# Patient Record
Sex: MALE | Race: WHITE | NOT HISPANIC OR LATINO | Employment: FULL TIME | URBAN - METROPOLITAN AREA
[De-identification: names, ages, dates, MRNs, and addresses within clinical notes are randomized per-mention and may not be internally consistent; named-entity substitution may affect disease eponyms.]

---

## 2019-08-23 ENCOUNTER — HOSPITAL ENCOUNTER (EMERGENCY)
Facility: OTHER | Age: 19
Discharge: HOME | End: 2019-08-23
Attending: EMERGENCY MEDICINE
Payer: COMMERCIAL

## 2019-08-23 VITALS
OXYGEN SATURATION: 99 % | HEIGHT: 71 IN | DIASTOLIC BLOOD PRESSURE: 78 MMHG | HEART RATE: 89 BPM | BODY MASS INDEX: 20.3 KG/M2 | RESPIRATION RATE: 17 BRPM | WEIGHT: 145 LBS | SYSTOLIC BLOOD PRESSURE: 115 MMHG | TEMPERATURE: 98 F

## 2019-08-23 DIAGNOSIS — R07.89 CHEST WALL PAIN: ICD-10-CM
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
## 2019-08-23 PROCEDURE — 94799 UNLISTED PULMONARY SVC/PX: CPT

## 2019-08-23 PROCEDURE — 99283 EMERGENCY DEPT VISIT LOW MDM: CPT | Mod: 25

## 2019-08-23 PROCEDURE — 99900035 HC TECH TIME PER 15 MIN (STAT)

## 2019-08-23 RX ORDER — KETOROLAC TROMETHAMINE 30 MG/ML
15 INJECTION, SOLUTION INTRAMUSCULAR; INTRAVENOUS
Status: DISCONTINUED | OUTPATIENT
Start: 2019-08-23 | End: 2019-08-24 | Stop reason: HOSPADM

## 2019-08-23 RX ORDER — PSEUDOEPHEDRINE HCL 30 MG
30 TABLET ORAL EVERY 4 HOURS PRN
COMMUNITY
End: 2022-10-18

## 2019-08-24 NOTE — DISCHARGE INSTRUCTIONS
Please continue to use an ice pack to the affected regions.  Take Tylenol or Motrin for pain. Follow-up with ENT specialist this week.  Return to the ER if your symptoms worsen.
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
## 2021-01-03 ENCOUNTER — OFFICE VISIT (OUTPATIENT)
Dept: URGENT CARE | Facility: CLINIC | Age: 21
End: 2021-01-03

## 2021-01-03 VITALS
SYSTOLIC BLOOD PRESSURE: 118 MMHG | HEIGHT: 70 IN | TEMPERATURE: 99 F | OXYGEN SATURATION: 97 % | BODY MASS INDEX: 22.19 KG/M2 | HEART RATE: 108 BPM | WEIGHT: 155 LBS | DIASTOLIC BLOOD PRESSURE: 75 MMHG | RESPIRATION RATE: 18 BRPM

## 2021-01-03 DIAGNOSIS — H66.93 BILATERAL OTITIS MEDIA, UNSPECIFIED OTITIS MEDIA TYPE: ICD-10-CM

## 2021-01-03 DIAGNOSIS — M26.609 TMJ (TEMPOROMANDIBULAR JOINT DISORDER): ICD-10-CM

## 2021-01-03 DIAGNOSIS — H60.93 OTITIS EXTERNA OF BOTH EARS, UNSPECIFIED CHRONICITY, UNSPECIFIED TYPE: ICD-10-CM

## 2021-01-03 DIAGNOSIS — H61.23 BILATERAL IMPACTED CERUMEN: Primary | ICD-10-CM

## 2021-01-03 PROCEDURE — 99214 OFFICE O/P EST MOD 30 MIN: CPT | Mod: TIER,S$GLB,, | Performed by: FAMILY MEDICINE

## 2021-01-03 PROCEDURE — 99214 PR OFFICE/OUTPT VISIT, EST, LEVL IV, 30-39 MIN: ICD-10-PCS | Mod: TIER,S$GLB,, | Performed by: FAMILY MEDICINE

## 2021-01-03 RX ORDER — NEOMYCIN SULFATE, POLYMYXIN B SULFATE, HYDROCORTISONE 3.5; 10000; 1 MG/ML; [USP'U]/ML; MG/ML
4 SOLUTION/ DROPS AURICULAR (OTIC) 3 TIMES DAILY
Qty: 10 ML | Refills: 0 | Status: SHIPPED | OUTPATIENT
Start: 2021-01-03 | End: 2021-01-13

## 2021-01-03 RX ORDER — DOXYCYCLINE 100 MG/1
100 CAPSULE ORAL 2 TIMES DAILY
Qty: 20 CAPSULE | Refills: 0 | Status: SHIPPED | OUTPATIENT
Start: 2021-01-03 | End: 2021-01-13

## 2021-04-05 DIAGNOSIS — N63.20 UNSPECIFIED LUMP IN THE LEFT BREAST, UNSPECIFIED QUADRANT: Primary | ICD-10-CM

## 2021-04-13 ENCOUNTER — HOSPITAL ENCOUNTER (OUTPATIENT)
Dept: RADIOLOGY | Facility: HOSPITAL | Age: 21
Discharge: HOME | End: 2021-04-13
Attending: NURSE PRACTITIONER

## 2021-04-13 DIAGNOSIS — N63.20 UNSPECIFIED LUMP IN THE LEFT BREAST, UNSPECIFIED QUADRANT: ICD-10-CM

## 2021-04-13 PROCEDURE — 76642 ULTRASOUND BREAST LIMITED: CPT | Mod: 26,LT,, | Performed by: RADIOLOGY

## 2021-04-13 PROCEDURE — 76642 ULTRASOUND BREAST LIMITED: CPT | Mod: TC,LT

## 2021-04-13 PROCEDURE — 76642 US BREAST LEFT LIMITED: ICD-10-PCS | Mod: 26,LT,, | Performed by: RADIOLOGY

## 2021-05-05 DIAGNOSIS — N49.2 INFLAMMATION OF SCROTUM: Primary | ICD-10-CM

## 2021-05-05 DIAGNOSIS — N50.819 PAIN IN TESTICLE, UNSPECIFIED LATERALITY: ICD-10-CM

## 2021-05-05 DIAGNOSIS — N62 GYNECOMASTIA: ICD-10-CM

## 2021-05-11 ENCOUNTER — HOSPITAL ENCOUNTER (OUTPATIENT)
Dept: RADIOLOGY | Facility: HOSPITAL | Age: 21
Discharge: HOME | End: 2021-05-11
Attending: NURSE PRACTITIONER

## 2021-05-11 DIAGNOSIS — N50.819 PAIN IN TESTICLE, UNSPECIFIED LATERALITY: ICD-10-CM

## 2021-05-11 DIAGNOSIS — N49.2 INFLAMMATION OF SCROTUM: ICD-10-CM

## 2021-05-11 DIAGNOSIS — N62 GYNECOMASTIA: ICD-10-CM

## 2021-05-11 PROCEDURE — 76870 US EXAM SCROTUM: CPT | Mod: 26,,, | Performed by: INTERNAL MEDICINE

## 2021-05-11 PROCEDURE — 76870 US SCROTUM AND TESTICLES: ICD-10-PCS | Mod: 26,,, | Performed by: INTERNAL MEDICINE

## 2021-05-11 PROCEDURE — 76870 US EXAM SCROTUM: CPT | Mod: TC

## 2021-11-02 ENCOUNTER — OFFICE VISIT (OUTPATIENT)
Dept: INFECTIOUS DISEASES | Facility: CLINIC | Age: 21
End: 2021-11-02
Payer: COMMERCIAL

## 2021-11-02 VITALS
TEMPERATURE: 98 F | HEIGHT: 70 IN | BODY MASS INDEX: 19.47 KG/M2 | SYSTOLIC BLOOD PRESSURE: 131 MMHG | DIASTOLIC BLOOD PRESSURE: 84 MMHG | WEIGHT: 136 LBS | HEART RATE: 106 BPM

## 2021-11-02 DIAGNOSIS — H10.33 ACUTE CONJUNCTIVITIS OF BOTH EYES, UNSPECIFIED ACUTE CONJUNCTIVITIS TYPE: ICD-10-CM

## 2021-11-02 DIAGNOSIS — B07.9 VIRAL WARTS, UNSPECIFIED TYPE: ICD-10-CM

## 2021-11-02 DIAGNOSIS — B20 HIV INFECTION, UNSPECIFIED SYMPTOM STATUS: Primary | ICD-10-CM

## 2021-11-02 PROBLEM — Z21 HIV INFECTION: Status: ACTIVE | Noted: 2021-11-02

## 2021-11-02 PROCEDURE — 99213 PR OFFICE/OUTPT VISIT, EST, LEVL III, 20-29 MIN: ICD-10-PCS | Mod: S$GLB,,, | Performed by: STUDENT IN AN ORGANIZED HEALTH CARE EDUCATION/TRAINING PROGRAM

## 2021-11-02 PROCEDURE — 99213 OFFICE O/P EST LOW 20 MIN: CPT | Mod: S$GLB,,, | Performed by: STUDENT IN AN ORGANIZED HEALTH CARE EDUCATION/TRAINING PROGRAM

## 2021-11-02 PROCEDURE — 99999 PR PBB SHADOW E&M-EST. PATIENT-LVL III: CPT | Mod: PBBFAC,,, | Performed by: STUDENT IN AN ORGANIZED HEALTH CARE EDUCATION/TRAINING PROGRAM

## 2021-11-02 PROCEDURE — 99999 PR PBB SHADOW E&M-EST. PATIENT-LVL III: ICD-10-PCS | Mod: PBBFAC,,, | Performed by: STUDENT IN AN ORGANIZED HEALTH CARE EDUCATION/TRAINING PROGRAM

## 2021-11-02 RX ORDER — FLUCONAZOLE 100 MG/1
100 TABLET ORAL DAILY
Qty: 7 TABLET | Refills: 0 | Status: SHIPPED | OUTPATIENT
Start: 2021-11-02 | End: 2021-11-09

## 2021-11-02 RX ORDER — POLYMYXIN B SULFATE AND TRIMETHOPRIM 1; 10000 MG/ML; [USP'U]/ML
1 SOLUTION OPHTHALMIC EVERY 6 HOURS
Qty: 10 ML | Refills: 0 | Status: SHIPPED | OUTPATIENT
Start: 2021-11-02 | End: 2021-11-22

## 2021-11-02 RX ORDER — DOXYCYCLINE 100 MG/1
100 CAPSULE ORAL EVERY 12 HOURS
Qty: 14 CAPSULE | Refills: 0 | Status: SHIPPED | OUTPATIENT
Start: 2021-11-02 | End: 2021-11-09

## 2021-11-03 ENCOUNTER — SPECIALTY PHARMACY (OUTPATIENT)
Dept: PHARMACY | Facility: CLINIC | Age: 21
End: 2021-11-03
Payer: COMMERCIAL

## 2021-11-03 ENCOUNTER — PATIENT MESSAGE (OUTPATIENT)
Dept: INFECTIOUS DISEASES | Facility: CLINIC | Age: 21
End: 2021-11-03
Payer: COMMERCIAL

## 2021-11-04 ENCOUNTER — TELEPHONE (OUTPATIENT)
Dept: OPHTHALMOLOGY | Facility: CLINIC | Age: 21
End: 2021-11-04
Payer: COMMERCIAL

## 2021-11-04 ENCOUNTER — LAB VISIT (OUTPATIENT)
Dept: LAB | Facility: HOSPITAL | Age: 21
End: 2021-11-04
Attending: STUDENT IN AN ORGANIZED HEALTH CARE EDUCATION/TRAINING PROGRAM
Payer: COMMERCIAL

## 2021-11-04 DIAGNOSIS — B20 HIV INFECTION, UNSPECIFIED SYMPTOM STATUS: ICD-10-CM

## 2021-11-04 LAB
ALBUMIN SERPL BCP-MCNC: 4.6 G/DL (ref 3.5–5.2)
ALP SERPL-CCNC: 127 U/L (ref 55–135)
ALT SERPL W/O P-5'-P-CCNC: 26 U/L (ref 10–44)
ANION GAP SERPL CALC-SCNC: 18 MMOL/L (ref 8–16)
AST SERPL-CCNC: 24 U/L (ref 10–40)
BASOPHILS # BLD AUTO: 0.05 K/UL (ref 0–0.2)
BASOPHILS NFR BLD: 0.5 % (ref 0–1.9)
BILIRUB SERPL-MCNC: 0.8 MG/DL (ref 0.1–1)
BUN SERPL-MCNC: 8 MG/DL (ref 6–20)
CALCIUM SERPL-MCNC: 10 MG/DL (ref 8.7–10.5)
CHLORIDE SERPL-SCNC: 99 MMOL/L (ref 95–110)
CO2 SERPL-SCNC: 22 MMOL/L (ref 23–29)
CREAT SERPL-MCNC: 0.9 MG/DL (ref 0.5–1.4)
DIFFERENTIAL METHOD: ABNORMAL
EOSINOPHIL # BLD AUTO: 0.1 K/UL (ref 0–0.5)
EOSINOPHIL NFR BLD: 1 % (ref 0–8)
ERYTHROCYTE [DISTWIDTH] IN BLOOD BY AUTOMATED COUNT: 17.2 % (ref 11.5–14.5)
EST. GFR  (AFRICAN AMERICAN): >60 ML/MIN/1.73 M^2
EST. GFR  (NON AFRICAN AMERICAN): >60 ML/MIN/1.73 M^2
GLUCOSE SERPL-MCNC: 88 MG/DL (ref 70–110)
HCT VFR BLD AUTO: 68.3 % (ref 40–54)
HGB BLD-MCNC: 22.2 G/DL (ref 14–18)
IMM GRANULOCYTES # BLD AUTO: 0.02 K/UL (ref 0–0.04)
IMM GRANULOCYTES NFR BLD AUTO: 0.2 % (ref 0–0.5)
LYMPHOCYTES # BLD AUTO: 3.8 K/UL (ref 1–4.8)
LYMPHOCYTES NFR BLD: 40.9 % (ref 18–48)
MCH RBC QN AUTO: 31.9 PG (ref 27–31)
MCHC RBC AUTO-ENTMCNC: 32.5 G/DL (ref 32–36)
MCV RBC AUTO: 98 FL (ref 82–98)
MONOCYTES # BLD AUTO: 0.8 K/UL (ref 0.3–1)
MONOCYTES NFR BLD: 8.7 % (ref 4–15)
NEUTROPHILS # BLD AUTO: 4.5 K/UL (ref 1.8–7.7)
NEUTROPHILS NFR BLD: 48.7 % (ref 38–73)
NRBC BLD-RTO: 0 /100 WBC
PLATELET # BLD AUTO: 202 K/UL (ref 150–450)
PMV BLD AUTO: 10.1 FL (ref 9.2–12.9)
POTASSIUM SERPL-SCNC: 4.6 MMOL/L (ref 3.5–5.1)
PROT SERPL-MCNC: 8.6 G/DL (ref 6–8.4)
RBC # BLD AUTO: 6.96 M/UL (ref 4.6–6.2)
SODIUM SERPL-SCNC: 139 MMOL/L (ref 136–145)
WBC # BLD AUTO: 9.24 K/UL (ref 3.9–12.7)

## 2021-11-04 PROCEDURE — 86803 HEPATITIS C AB TEST: CPT | Performed by: STUDENT IN AN ORGANIZED HEALTH CARE EDUCATION/TRAINING PROGRAM

## 2021-11-04 PROCEDURE — 86790 VIRUS ANTIBODY NOS: CPT | Performed by: STUDENT IN AN ORGANIZED HEALTH CARE EDUCATION/TRAINING PROGRAM

## 2021-11-04 PROCEDURE — 87901 NFCT AGT GNTYP ALYS HIV1 REV: CPT | Performed by: STUDENT IN AN ORGANIZED HEALTH CARE EDUCATION/TRAINING PROGRAM

## 2021-11-04 PROCEDURE — 86704 HEP B CORE ANTIBODY TOTAL: CPT | Performed by: STUDENT IN AN ORGANIZED HEALTH CARE EDUCATION/TRAINING PROGRAM

## 2021-11-04 PROCEDURE — 81381 HLA I TYPING 1 ALLELE HR: CPT | Mod: PO | Performed by: STUDENT IN AN ORGANIZED HEALTH CARE EDUCATION/TRAINING PROGRAM

## 2021-11-04 PROCEDURE — 87536 HIV-1 QUANT&REVRSE TRNSCRPJ: CPT | Performed by: STUDENT IN AN ORGANIZED HEALTH CARE EDUCATION/TRAINING PROGRAM

## 2021-11-04 PROCEDURE — 86706 HEP B SURFACE ANTIBODY: CPT | Performed by: STUDENT IN AN ORGANIZED HEALTH CARE EDUCATION/TRAINING PROGRAM

## 2021-11-04 PROCEDURE — 36415 COLL VENOUS BLD VENIPUNCTURE: CPT | Performed by: STUDENT IN AN ORGANIZED HEALTH CARE EDUCATION/TRAINING PROGRAM

## 2021-11-04 PROCEDURE — 80053 COMPREHEN METABOLIC PANEL: CPT | Performed by: STUDENT IN AN ORGANIZED HEALTH CARE EDUCATION/TRAINING PROGRAM

## 2021-11-04 PROCEDURE — 85025 COMPLETE CBC W/AUTO DIFF WBC: CPT | Performed by: STUDENT IN AN ORGANIZED HEALTH CARE EDUCATION/TRAINING PROGRAM

## 2021-11-04 PROCEDURE — 87340 HEPATITIS B SURFACE AG IA: CPT | Performed by: STUDENT IN AN ORGANIZED HEALTH CARE EDUCATION/TRAINING PROGRAM

## 2021-11-04 PROCEDURE — 86361 T CELL ABSOLUTE COUNT: CPT | Performed by: STUDENT IN AN ORGANIZED HEALTH CARE EDUCATION/TRAINING PROGRAM

## 2021-11-04 PROCEDURE — 86592 SYPHILIS TEST NON-TREP QUAL: CPT | Performed by: STUDENT IN AN ORGANIZED HEALTH CARE EDUCATION/TRAINING PROGRAM

## 2021-11-04 PROCEDURE — 86777 TOXOPLASMA ANTIBODY: CPT | Performed by: STUDENT IN AN ORGANIZED HEALTH CARE EDUCATION/TRAINING PROGRAM

## 2021-11-04 PROCEDURE — 86780 TREPONEMA PALLIDUM: CPT | Performed by: STUDENT IN AN ORGANIZED HEALTH CARE EDUCATION/TRAINING PROGRAM

## 2021-11-04 PROCEDURE — 86403 PARTICLE AGGLUT ANTBDY SCRN: CPT | Performed by: STUDENT IN AN ORGANIZED HEALTH CARE EDUCATION/TRAINING PROGRAM

## 2021-11-04 PROCEDURE — 82955 ASSAY OF G6PD ENZYME: CPT | Performed by: STUDENT IN AN ORGANIZED HEALTH CARE EDUCATION/TRAINING PROGRAM

## 2021-11-04 PROCEDURE — 87906 NFCT AGT GNTYP ALYS HIV1: CPT | Performed by: STUDENT IN AN ORGANIZED HEALTH CARE EDUCATION/TRAINING PROGRAM

## 2021-11-05 ENCOUNTER — LAB VISIT (OUTPATIENT)
Dept: LAB | Facility: HOSPITAL | Age: 21
End: 2021-11-05
Attending: STUDENT IN AN ORGANIZED HEALTH CARE EDUCATION/TRAINING PROGRAM
Payer: COMMERCIAL

## 2021-11-05 ENCOUNTER — OFFICE VISIT (OUTPATIENT)
Dept: OPHTHALMOLOGY | Facility: CLINIC | Age: 21
End: 2021-11-05
Payer: COMMERCIAL

## 2021-11-05 ENCOUNTER — TELEPHONE (OUTPATIENT)
Dept: INFECTIOUS DISEASES | Facility: CLINIC | Age: 21
End: 2021-11-05
Payer: COMMERCIAL

## 2021-11-05 ENCOUNTER — PATIENT MESSAGE (OUTPATIENT)
Dept: PHARMACY | Facility: CLINIC | Age: 21
End: 2021-11-05
Payer: COMMERCIAL

## 2021-11-05 DIAGNOSIS — D75.1 POLYCYTHEMIA: Primary | ICD-10-CM

## 2021-11-05 DIAGNOSIS — B30.1: Primary | ICD-10-CM

## 2021-11-05 DIAGNOSIS — B20 CURRENTLY ASYMPTOMATIC HIV INFECTION, WITH HISTORY OF HIV-RELATED ILLNESS: ICD-10-CM

## 2021-11-05 DIAGNOSIS — D75.1 POLYCYTHEMIA: ICD-10-CM

## 2021-11-05 LAB
BASOPHILS # BLD AUTO: 0.05 K/UL (ref 0–0.2)
BASOPHILS NFR BLD: 0.6 % (ref 0–1.9)
CD3+CD4+ CELLS # BLD: 726 CELLS/UL (ref 300–1400)
CD3+CD4+ CELLS NFR BLD: 19.4 % (ref 28–57)
CRYPTOC AG SER QL LA: NEGATIVE
DIFFERENTIAL METHOD: ABNORMAL
EOSINOPHIL # BLD AUTO: 0.1 K/UL (ref 0–0.5)
EOSINOPHIL NFR BLD: 1.7 % (ref 0–8)
ERYTHROCYTE [DISTWIDTH] IN BLOOD BY AUTOMATED COUNT: 16.4 % (ref 11.5–14.5)
HBV CORE AB SERPL QL IA: NEGATIVE
HBV SURFACE AB SER-ACNC: NEGATIVE M[IU]/ML
HBV SURFACE AG SERPL QL IA: NEGATIVE
HCT VFR BLD AUTO: 62 % (ref 40–54)
HCV AB SERPL QL IA: POSITIVE
HEPATITIS A ANTIBODY, IGG: POSITIVE
HGB BLD-MCNC: 20 G/DL (ref 14–18)
IMM GRANULOCYTES # BLD AUTO: 0.02 K/UL (ref 0–0.04)
IMM GRANULOCYTES NFR BLD AUTO: 0.3 % (ref 0–0.5)
LYMPHOCYTES # BLD AUTO: 3.5 K/UL (ref 1–4.8)
LYMPHOCYTES NFR BLD: 44.5 % (ref 18–48)
MCH RBC QN AUTO: 32.1 PG (ref 27–31)
MCHC RBC AUTO-ENTMCNC: 32.3 G/DL (ref 32–36)
MCV RBC AUTO: 100 FL (ref 82–98)
MONOCYTES # BLD AUTO: 0.8 K/UL (ref 0.3–1)
MONOCYTES NFR BLD: 10.1 % (ref 4–15)
NEUTROPHILS # BLD AUTO: 3.3 K/UL (ref 1.8–7.7)
NEUTROPHILS NFR BLD: 42.8 % (ref 38–73)
NRBC BLD-RTO: 0 /100 WBC
PLATELET # BLD AUTO: 190 K/UL (ref 150–450)
PMV BLD AUTO: 9.4 FL (ref 9.2–12.9)
RBC # BLD AUTO: 6.23 M/UL (ref 4.6–6.2)
RPR SER QL: NORMAL
T PALLIDUM AB SER QL IF: NORMAL
WBC # BLD AUTO: 7.76 K/UL (ref 3.9–12.7)

## 2021-11-05 PROCEDURE — 85025 COMPLETE CBC W/AUTO DIFF WBC: CPT | Performed by: STUDENT IN AN ORGANIZED HEALTH CARE EDUCATION/TRAINING PROGRAM

## 2021-11-05 PROCEDURE — 99999 PR PBB SHADOW E&M-EST. PATIENT-LVL II: ICD-10-PCS | Mod: PBBFAC,,, | Performed by: OPHTHALMOLOGY

## 2021-11-05 PROCEDURE — 99202 PR OFFICE/OUTPT VISIT, NEW, LEVL II, 15-29 MIN: ICD-10-PCS | Mod: S$GLB,,, | Performed by: OPHTHALMOLOGY

## 2021-11-05 PROCEDURE — 99202 OFFICE O/P NEW SF 15 MIN: CPT | Mod: S$GLB,,, | Performed by: OPHTHALMOLOGY

## 2021-11-05 PROCEDURE — 99999 PR PBB SHADOW E&M-EST. PATIENT-LVL II: CPT | Mod: PBBFAC,,, | Performed by: OPHTHALMOLOGY

## 2021-11-06 ENCOUNTER — TELEPHONE (OUTPATIENT)
Dept: INFECTIOUS DISEASES | Facility: CLINIC | Age: 21
End: 2021-11-06
Payer: COMMERCIAL

## 2021-11-06 RX ORDER — ONDANSETRON 4 MG/1
4 TABLET, FILM COATED ORAL EVERY 12 HOURS PRN
Qty: 14 TABLET | Refills: 0 | Status: SHIPPED | OUTPATIENT
Start: 2021-11-06 | End: 2021-11-13

## 2021-11-08 LAB
G6PD RBC-CCNT: 8.6 U/G HB (ref 8–11.9)
HIV1 RNA # PLAS NAA DL=20: ABNORMAL COPIES/ML

## 2021-11-10 ENCOUNTER — OFFICE VISIT (OUTPATIENT)
Dept: OPHTHALMOLOGY | Facility: CLINIC | Age: 21
End: 2021-11-10
Payer: COMMERCIAL

## 2021-11-10 DIAGNOSIS — H11.30 SUBCONJUNCTIVAL HEMORRHAGE, UNSPECIFIED LATERALITY: ICD-10-CM

## 2021-11-10 DIAGNOSIS — B30.1: Primary | ICD-10-CM

## 2021-11-10 LAB
T GONDII IGG SER QL IA: NORMAL
T GONDII IGG SERPL IA-ACNC: <5 IU/ML (ref 0–6.4)

## 2021-11-10 PROCEDURE — 99999 PR PBB SHADOW E&M-EST. PATIENT-LVL II: CPT | Mod: PBBFAC,,, | Performed by: OPHTHALMOLOGY

## 2021-11-10 PROCEDURE — 92014 PR EYE EXAM, EST PATIENT,COMPREHESV: ICD-10-PCS | Mod: S$GLB,,, | Performed by: OPHTHALMOLOGY

## 2021-11-10 PROCEDURE — 99999 PR PBB SHADOW E&M-EST. PATIENT-LVL II: ICD-10-PCS | Mod: PBBFAC,,, | Performed by: OPHTHALMOLOGY

## 2021-11-10 PROCEDURE — 92014 COMPRE OPH EXAM EST PT 1/>: CPT | Mod: S$GLB,,, | Performed by: OPHTHALMOLOGY

## 2021-11-11 LAB
BICTEGRAVIR ISLT GENOTYP: NORMAL
COLLECT DATE: NORMAL
DOLUTEGRAVIR ISLT GENOTYP: NORMAL
ELVITEGRAVIR ISLT GENOTYP: NORMAL
RALTEGRAVIR ISLT GENOTYP: NORMAL
VALUE OF LAST VIRAL LOAD: NORMAL COPIES/ML

## 2021-11-12 ENCOUNTER — OFFICE VISIT (OUTPATIENT)
Dept: HEMATOLOGY/ONCOLOGY | Facility: CLINIC | Age: 21
End: 2021-11-12
Payer: COMMERCIAL

## 2021-11-12 ENCOUNTER — LAB VISIT (OUTPATIENT)
Dept: LAB | Facility: HOSPITAL | Age: 21
End: 2021-11-12
Attending: INTERNAL MEDICINE
Payer: COMMERCIAL

## 2021-11-12 VITALS
DIASTOLIC BLOOD PRESSURE: 71 MMHG | TEMPERATURE: 98 F | RESPIRATION RATE: 12 BRPM | BODY MASS INDEX: 19.71 KG/M2 | OXYGEN SATURATION: 95 % | WEIGHT: 137.69 LBS | SYSTOLIC BLOOD PRESSURE: 118 MMHG | HEART RATE: 96 BPM | HEIGHT: 70 IN

## 2021-11-12 DIAGNOSIS — H10.33 ACUTE CONJUNCTIVITIS OF BOTH EYES, UNSPECIFIED ACUTE CONJUNCTIVITIS TYPE: ICD-10-CM

## 2021-11-12 DIAGNOSIS — B20 HIV INFECTION, UNSPECIFIED SYMPTOM STATUS: ICD-10-CM

## 2021-11-12 DIAGNOSIS — D75.1 POLYCYTHEMIA: Primary | ICD-10-CM

## 2021-11-12 DIAGNOSIS — D75.1 POLYCYTHEMIA: ICD-10-CM

## 2021-11-12 LAB
BASOPHILS # BLD AUTO: 0.02 K/UL (ref 0–0.2)
BASOPHILS NFR BLD: 0.3 % (ref 0–1.9)
DIFFERENTIAL METHOD: ABNORMAL
EOSINOPHIL # BLD AUTO: 0.2 K/UL (ref 0–0.5)
EOSINOPHIL NFR BLD: 2.9 % (ref 0–8)
ERYTHROCYTE [DISTWIDTH] IN BLOOD BY AUTOMATED COUNT: 15.2 % (ref 11.5–14.5)
HCT VFR BLD AUTO: 57.8 % (ref 40–54)
HGB BLD-MCNC: 18.8 G/DL (ref 14–18)
IMM GRANULOCYTES # BLD AUTO: 0.01 K/UL (ref 0–0.04)
IMM GRANULOCYTES NFR BLD AUTO: 0.2 % (ref 0–0.5)
LYMPHOCYTES # BLD AUTO: 2.4 K/UL (ref 1–4.8)
LYMPHOCYTES NFR BLD: 37.8 % (ref 18–48)
MCH RBC QN AUTO: 31.9 PG (ref 27–31)
MCHC RBC AUTO-ENTMCNC: 32.5 G/DL (ref 32–36)
MCV RBC AUTO: 98 FL (ref 82–98)
MONOCYTES # BLD AUTO: 0.8 K/UL (ref 0.3–1)
MONOCYTES NFR BLD: 12.1 % (ref 4–15)
NEUTROPHILS # BLD AUTO: 3 K/UL (ref 1.8–7.7)
NEUTROPHILS NFR BLD: 46.7 % (ref 38–73)
NRBC BLD-RTO: 0 /100 WBC
PLATELET # BLD AUTO: 214 K/UL (ref 150–450)
PMV BLD AUTO: 9.2 FL (ref 9.2–12.9)
RBC # BLD AUTO: 5.89 M/UL (ref 4.6–6.2)
WBC # BLD AUTO: 6.3 K/UL (ref 3.9–12.7)

## 2021-11-12 PROCEDURE — 99999 PR PBB SHADOW E&M-EST. PATIENT-LVL IV: ICD-10-PCS | Mod: PBBFAC,,, | Performed by: INTERNAL MEDICINE

## 2021-11-12 PROCEDURE — 81219 CALR GENE COM VARIANTS: CPT | Performed by: INTERNAL MEDICINE

## 2021-11-12 PROCEDURE — 81339 MPL GENE SEQ ALYS EXON 10: CPT | Performed by: INTERNAL MEDICINE

## 2021-11-12 PROCEDURE — 81270 JAK2 GENE: CPT | Performed by: INTERNAL MEDICINE

## 2021-11-12 PROCEDURE — 99205 OFFICE O/P NEW HI 60 MIN: CPT | Mod: S$GLB,,, | Performed by: INTERNAL MEDICINE

## 2021-11-12 PROCEDURE — 85025 COMPLETE CBC W/AUTO DIFF WBC: CPT | Performed by: INTERNAL MEDICINE

## 2021-11-12 PROCEDURE — 36415 COLL VENOUS BLD VENIPUNCTURE: CPT | Performed by: INTERNAL MEDICINE

## 2021-11-12 PROCEDURE — 99999 PR PBB SHADOW E&M-EST. PATIENT-LVL IV: CPT | Mod: PBBFAC,,, | Performed by: INTERNAL MEDICINE

## 2021-11-12 PROCEDURE — 99205 PR OFFICE/OUTPT VISIT, NEW, LEVL V, 60-74 MIN: ICD-10-PCS | Mod: S$GLB,,, | Performed by: INTERNAL MEDICINE

## 2021-11-12 RX ORDER — IBUPROFEN 400 MG/1
400 TABLET ORAL EVERY 4 HOURS PRN
Status: ON HOLD | COMMUNITY
End: 2023-10-10 | Stop reason: HOSPADM

## 2021-11-17 LAB — HIV GENTYP ISLT: DETECTED

## 2021-11-19 LAB
B5701 INTERPRETATION: NORMAL
HLA-B27 RELATED AG QL: NEGATIVE
HLA-B27 RELATED AG QL: NORMAL
MPNR  FINAL DIAGNOSIS: NORMAL
MPNR  SPECIMEN TYPE: 1
MPNR RESULT: NORMAL

## 2021-11-25 ENCOUNTER — PATIENT MESSAGE (OUTPATIENT)
Dept: INFECTIOUS DISEASES | Facility: CLINIC | Age: 21
End: 2021-11-25
Payer: COMMERCIAL

## 2021-11-26 RX ORDER — FLUCONAZOLE 200 MG/1
200 TABLET ORAL DAILY
Qty: 7 TABLET | Refills: 0 | Status: SHIPPED | OUTPATIENT
Start: 2021-11-26 | End: 2021-12-03

## 2021-11-28 PROBLEM — D75.1 POLYCYTHEMIA: Status: ACTIVE | Noted: 2021-11-28

## 2021-11-29 ENCOUNTER — PATIENT MESSAGE (OUTPATIENT)
Dept: HEMATOLOGY/ONCOLOGY | Facility: CLINIC | Age: 21
End: 2021-11-29
Payer: COMMERCIAL

## 2021-11-30 DIAGNOSIS — D75.1 POLYCYTHEMIA: Primary | ICD-10-CM

## 2021-12-03 ENCOUNTER — LAB VISIT (OUTPATIENT)
Dept: LAB | Facility: HOSPITAL | Age: 21
End: 2021-12-03
Payer: COMMERCIAL

## 2021-12-03 ENCOUNTER — DOCUMENTATION ONLY (OUTPATIENT)
Dept: HEMATOLOGY/ONCOLOGY | Facility: CLINIC | Age: 21
End: 2021-12-03
Payer: COMMERCIAL

## 2021-12-03 ENCOUNTER — OFFICE VISIT (OUTPATIENT)
Dept: HEMATOLOGY/ONCOLOGY | Facility: CLINIC | Age: 21
End: 2021-12-03
Payer: COMMERCIAL

## 2021-12-03 VITALS
RESPIRATION RATE: 16 BRPM | BODY MASS INDEX: 19.14 KG/M2 | SYSTOLIC BLOOD PRESSURE: 124 MMHG | HEART RATE: 118 BPM | WEIGHT: 133.69 LBS | DIASTOLIC BLOOD PRESSURE: 64 MMHG | HEIGHT: 70 IN | TEMPERATURE: 98 F | OXYGEN SATURATION: 97 %

## 2021-12-03 DIAGNOSIS — D75.1 POLYCYTHEMIA: Primary | ICD-10-CM

## 2021-12-03 DIAGNOSIS — D75.1 POLYCYTHEMIA: ICD-10-CM

## 2021-12-03 DIAGNOSIS — R45.89 ANXIETY ABOUT HEALTH: ICD-10-CM

## 2021-12-03 LAB
ANISOCYTOSIS BLD QL SMEAR: SLIGHT
BASOPHILS # BLD AUTO: 0.05 K/UL (ref 0–0.2)
BASOPHILS NFR BLD: 0.8 % (ref 0–1.9)
DIFFERENTIAL METHOD: ABNORMAL
EOSINOPHIL # BLD AUTO: 0.2 K/UL (ref 0–0.5)
EOSINOPHIL NFR BLD: 3.2 % (ref 0–8)
ERYTHROCYTE [DISTWIDTH] IN BLOOD BY AUTOMATED COUNT: 17.3 % (ref 11.5–14.5)
HCT VFR BLD AUTO: 61.6 % (ref 40–54)
HGB BLD-MCNC: 20.3 G/DL (ref 14–18)
HYPOCHROMIA BLD QL SMEAR: ABNORMAL
IMM GRANULOCYTES # BLD AUTO: 0.03 K/UL (ref 0–0.04)
IMM GRANULOCYTES NFR BLD AUTO: 0.5 % (ref 0–0.5)
LYMPHOCYTES # BLD AUTO: 3 K/UL (ref 1–4.8)
LYMPHOCYTES NFR BLD: 50.5 % (ref 18–48)
MCH RBC QN AUTO: 32.5 PG (ref 27–31)
MCHC RBC AUTO-ENTMCNC: 33 G/DL (ref 32–36)
MCV RBC AUTO: 99 FL (ref 82–98)
MONOCYTES # BLD AUTO: 0.7 K/UL (ref 0.3–1)
MONOCYTES NFR BLD: 12.2 % (ref 4–15)
NEUTROPHILS # BLD AUTO: 2 K/UL (ref 1.8–7.7)
NEUTROPHILS NFR BLD: 32.8 % (ref 38–73)
NRBC BLD-RTO: 0 /100 WBC
PLATELET # BLD AUTO: 195 K/UL (ref 150–450)
PLATELET BLD QL SMEAR: ABNORMAL
PMV BLD AUTO: 9.2 FL (ref 9.2–12.9)
POIKILOCYTOSIS BLD QL SMEAR: SLIGHT
POLYCHROMASIA BLD QL SMEAR: ABNORMAL
RBC # BLD AUTO: 6.25 M/UL (ref 4.6–6.2)
TARGETS BLD QL SMEAR: ABNORMAL
WBC # BLD AUTO: 6 K/UL (ref 3.9–12.7)

## 2021-12-03 PROCEDURE — 99214 PR OFFICE/OUTPT VISIT, EST, LEVL IV, 30-39 MIN: ICD-10-PCS | Mod: S$GLB,,, | Performed by: NURSE PRACTITIONER

## 2021-12-03 PROCEDURE — 85025 COMPLETE CBC W/AUTO DIFF WBC: CPT | Performed by: NURSE PRACTITIONER

## 2021-12-03 PROCEDURE — 99214 OFFICE O/P EST MOD 30 MIN: CPT | Mod: S$GLB,,, | Performed by: NURSE PRACTITIONER

## 2021-12-03 PROCEDURE — 99999 PR PBB SHADOW E&M-EST. PATIENT-LVL IV: ICD-10-PCS | Mod: PBBFAC,,, | Performed by: NURSE PRACTITIONER

## 2021-12-03 PROCEDURE — 99999 PR PBB SHADOW E&M-EST. PATIENT-LVL IV: CPT | Mod: PBBFAC,,, | Performed by: NURSE PRACTITIONER

## 2021-12-03 PROCEDURE — 36415 COLL VENOUS BLD VENIPUNCTURE: CPT | Performed by: NURSE PRACTITIONER

## 2021-12-06 ENCOUNTER — PATIENT MESSAGE (OUTPATIENT)
Dept: HEMATOLOGY/ONCOLOGY | Facility: CLINIC | Age: 21
End: 2021-12-06
Payer: COMMERCIAL

## 2021-12-06 ENCOUNTER — HOSPITAL ENCOUNTER (OUTPATIENT)
Dept: TRANSFUSION MEDICINE | Facility: HOSPITAL | Age: 21
Discharge: HOME | End: 2021-12-06
Payer: COMMERCIAL

## 2021-12-06 DIAGNOSIS — D75.1 POLYCYTHEMIA: ICD-10-CM

## 2021-12-06 PROCEDURE — 99195 PHLEBOTOMY: CPT

## 2021-12-07 ENCOUNTER — HOSPITAL ENCOUNTER (EMERGENCY)
Facility: HOSPITAL | Age: 21
Discharge: HOME | End: 2021-12-08
Attending: EMERGENCY MEDICINE
Payer: COMMERCIAL

## 2021-12-07 DIAGNOSIS — T39.395A GI BLEED DUE TO NSAIDS: Primary | ICD-10-CM

## 2021-12-07 DIAGNOSIS — K92.2 GI BLEED DUE TO NSAIDS: Primary | ICD-10-CM

## 2021-12-07 DIAGNOSIS — K37 APPENDICITIS, UNSPECIFIED APPENDICITIS TYPE: ICD-10-CM

## 2021-12-07 DIAGNOSIS — N39.0 URINARY TRACT INFECTION WITHOUT HEMATURIA, SITE UNSPECIFIED: ICD-10-CM

## 2021-12-07 DIAGNOSIS — R10.31 RLQ ABDOMINAL PAIN: ICD-10-CM

## 2021-12-07 LAB
ABO + RH BLD: NORMAL
ALBUMIN SERPL BCP-MCNC: 4.2 G/DL (ref 3.5–5.2)
ALP SERPL-CCNC: 124 U/L (ref 55–135)
ALT SERPL W/O P-5'-P-CCNC: 15 U/L (ref 10–44)
ANION GAP SERPL CALC-SCNC: 13 MMOL/L (ref 8–16)
AST SERPL-CCNC: 22 U/L (ref 10–40)
BASOPHILS # BLD AUTO: 0.06 K/UL (ref 0–0.2)
BASOPHILS NFR BLD: 0.7 % (ref 0–1.9)
BILIRUB SERPL-MCNC: 0.9 MG/DL (ref 0.1–1)
BILIRUB UR QL STRIP: NEGATIVE
BLD GP AB SCN CELLS X3 SERPL QL: NORMAL
BUN SERPL-MCNC: 8 MG/DL (ref 6–20)
CALCIUM SERPL-MCNC: 10 MG/DL (ref 8.7–10.5)
CHLORIDE SERPL-SCNC: 101 MMOL/L (ref 95–110)
CLARITY UR REFRACT.AUTO: CLEAR
CO2 SERPL-SCNC: 25 MMOL/L (ref 23–29)
COLOR UR AUTO: ABNORMAL
CREAT SERPL-MCNC: 0.8 MG/DL (ref 0.5–1.4)
DIFFERENTIAL METHOD: ABNORMAL
EOSINOPHIL # BLD AUTO: 0.2 K/UL (ref 0–0.5)
EOSINOPHIL NFR BLD: 2.2 % (ref 0–8)
ERYTHROCYTE [DISTWIDTH] IN BLOOD BY AUTOMATED COUNT: 17.6 % (ref 11.5–14.5)
EST. GFR  (AFRICAN AMERICAN): >60 ML/MIN/1.73 M^2
EST. GFR  (NON AFRICAN AMERICAN): >60 ML/MIN/1.73 M^2
GLUCOSE SERPL-MCNC: 83 MG/DL (ref 70–110)
GLUCOSE UR QL STRIP: NEGATIVE
HCT VFR BLD AUTO: 60.9 % (ref 40–54)
HGB BLD-MCNC: 20 G/DL (ref 14–18)
HGB UR QL STRIP: ABNORMAL
IMM GRANULOCYTES # BLD AUTO: 0.03 K/UL (ref 0–0.04)
IMM GRANULOCYTES NFR BLD AUTO: 0.4 % (ref 0–0.5)
KETONES UR QL STRIP: NEGATIVE
LEUKOCYTE ESTERASE UR QL STRIP: NEGATIVE
LIPASE SERPL-CCNC: 11 U/L (ref 4–60)
LYMPHOCYTES # BLD AUTO: 2.8 K/UL (ref 1–4.8)
LYMPHOCYTES NFR BLD: 33.3 % (ref 18–48)
MCH RBC QN AUTO: 32.7 PG (ref 27–31)
MCHC RBC AUTO-ENTMCNC: 32.8 G/DL (ref 32–36)
MCV RBC AUTO: 100 FL (ref 82–98)
MICROSCOPIC COMMENT: NORMAL
MONOCYTES # BLD AUTO: 0.7 K/UL (ref 0.3–1)
MONOCYTES NFR BLD: 8.2 % (ref 4–15)
NEUTROPHILS # BLD AUTO: 4.6 K/UL (ref 1.8–7.7)
NEUTROPHILS NFR BLD: 55.2 % (ref 38–73)
NITRITE UR QL STRIP: NEGATIVE
NRBC BLD-RTO: 0 /100 WBC
PH UR STRIP: 6 [PH] (ref 5–8)
PLATELET # BLD AUTO: 196 K/UL (ref 150–450)
PMV BLD AUTO: 9 FL (ref 9.2–12.9)
POTASSIUM SERPL-SCNC: 4.9 MMOL/L (ref 3.5–5.1)
PROT SERPL-MCNC: 8.2 G/DL (ref 6–8.4)
PROT UR QL STRIP: NEGATIVE
RBC # BLD AUTO: 6.11 M/UL (ref 4.6–6.2)
RBC #/AREA URNS AUTO: 0 /HPF (ref 0–4)
SODIUM SERPL-SCNC: 139 MMOL/L (ref 136–145)
SP GR UR STRIP: 1 (ref 1–1.03)
SQUAMOUS #/AREA URNS AUTO: 0 /HPF
URN SPEC COLLECT METH UR: ABNORMAL
WBC # BLD AUTO: 8.27 K/UL (ref 3.9–12.7)

## 2021-12-07 PROCEDURE — 86900 BLOOD TYPING SEROLOGIC ABO: CPT

## 2021-12-07 PROCEDURE — 99285 EMERGENCY DEPT VISIT HI MDM: CPT | Mod: 25

## 2021-12-07 PROCEDURE — 80053 COMPREHEN METABOLIC PANEL: CPT

## 2021-12-07 PROCEDURE — 25500020 PHARM REV CODE 255: Performed by: EMERGENCY MEDICINE

## 2021-12-07 PROCEDURE — 81001 URINALYSIS AUTO W/SCOPE: CPT

## 2021-12-07 PROCEDURE — 85025 COMPLETE CBC W/AUTO DIFF WBC: CPT

## 2021-12-07 PROCEDURE — 99285 PR EMERGENCY DEPT VISIT,LEVEL V: ICD-10-PCS | Mod: ,,, | Performed by: EMERGENCY MEDICINE

## 2021-12-07 PROCEDURE — 83690 ASSAY OF LIPASE: CPT

## 2021-12-07 PROCEDURE — 99285 EMERGENCY DEPT VISIT HI MDM: CPT | Mod: ,,, | Performed by: EMERGENCY MEDICINE

## 2021-12-07 RX ORDER — PANTOPRAZOLE SODIUM 40 MG/10ML
40 INJECTION, POWDER, LYOPHILIZED, FOR SOLUTION INTRAVENOUS
Status: DISCONTINUED | OUTPATIENT
Start: 2021-12-07 | End: 2021-12-07

## 2021-12-07 RX ADMIN — IOHEXOL 75 ML: 350 INJECTION, SOLUTION INTRAVENOUS at 10:12

## 2021-12-08 ENCOUNTER — TELEPHONE (OUTPATIENT)
Dept: HEMATOLOGY/ONCOLOGY | Facility: CLINIC | Age: 21
End: 2021-12-08
Payer: COMMERCIAL

## 2021-12-08 VITALS
OXYGEN SATURATION: 98 % | SYSTOLIC BLOOD PRESSURE: 137 MMHG | WEIGHT: 135 LBS | RESPIRATION RATE: 14 BRPM | TEMPERATURE: 98 F | BODY MASS INDEX: 19.33 KG/M2 | HEART RATE: 68 BPM | DIASTOLIC BLOOD PRESSURE: 72 MMHG | HEIGHT: 70 IN

## 2021-12-08 DIAGNOSIS — D75.1 POLYCYTHEMIA: Primary | ICD-10-CM

## 2021-12-09 ENCOUNTER — PATIENT MESSAGE (OUTPATIENT)
Dept: HEMATOLOGY/ONCOLOGY | Facility: CLINIC | Age: 21
End: 2021-12-09
Payer: COMMERCIAL

## 2021-12-09 ENCOUNTER — HOSPITAL ENCOUNTER (OUTPATIENT)
Dept: TRANSFUSION MEDICINE | Facility: HOSPITAL | Age: 21
Discharge: HOME | End: 2021-12-09
Attending: INTERNAL MEDICINE
Payer: COMMERCIAL

## 2021-12-09 ENCOUNTER — TELEPHONE (OUTPATIENT)
Dept: HEMATOLOGY/ONCOLOGY | Facility: CLINIC | Age: 21
End: 2021-12-09
Payer: COMMERCIAL

## 2021-12-09 ENCOUNTER — OFFICE VISIT (OUTPATIENT)
Dept: INFECTIOUS DISEASES | Facility: CLINIC | Age: 21
End: 2021-12-09
Payer: COMMERCIAL

## 2021-12-09 VITALS
BODY MASS INDEX: 19.26 KG/M2 | WEIGHT: 134.5 LBS | HEIGHT: 70 IN | DIASTOLIC BLOOD PRESSURE: 76 MMHG | SYSTOLIC BLOOD PRESSURE: 112 MMHG | HEART RATE: 96 BPM

## 2021-12-09 DIAGNOSIS — D75.1 POLYCYTHEMIA: Primary | ICD-10-CM

## 2021-12-09 DIAGNOSIS — B20 HIV INFECTION, UNSPECIFIED SYMPTOM STATUS: Primary | ICD-10-CM

## 2021-12-09 DIAGNOSIS — D75.1 POLYCYTHEMIA: ICD-10-CM

## 2021-12-09 PROCEDURE — 99999 PR PBB SHADOW E&M-EST. PATIENT-LVL III: CPT | Mod: PBBFAC,,, | Performed by: STUDENT IN AN ORGANIZED HEALTH CARE EDUCATION/TRAINING PROGRAM

## 2021-12-09 PROCEDURE — 87491 CHLMYD TRACH DNA AMP PROBE: CPT | Mod: 59 | Performed by: STUDENT IN AN ORGANIZED HEALTH CARE EDUCATION/TRAINING PROGRAM

## 2021-12-09 PROCEDURE — 99213 OFFICE O/P EST LOW 20 MIN: CPT | Mod: S$GLB,,, | Performed by: STUDENT IN AN ORGANIZED HEALTH CARE EDUCATION/TRAINING PROGRAM

## 2021-12-09 PROCEDURE — 99195 PHLEBOTOMY: CPT

## 2021-12-09 PROCEDURE — 99999 PR PBB SHADOW E&M-EST. PATIENT-LVL III: ICD-10-PCS | Mod: PBBFAC,,, | Performed by: STUDENT IN AN ORGANIZED HEALTH CARE EDUCATION/TRAINING PROGRAM

## 2021-12-09 PROCEDURE — 87491 CHLMYD TRACH DNA AMP PROBE: CPT | Performed by: STUDENT IN AN ORGANIZED HEALTH CARE EDUCATION/TRAINING PROGRAM

## 2021-12-09 PROCEDURE — 99213 PR OFFICE/OUTPT VISIT, EST, LEVL III, 20-29 MIN: ICD-10-PCS | Mod: S$GLB,,, | Performed by: STUDENT IN AN ORGANIZED HEALTH CARE EDUCATION/TRAINING PROGRAM

## 2021-12-09 PROCEDURE — 87591 N.GONORRHOEAE DNA AMP PROB: CPT | Mod: 59 | Performed by: STUDENT IN AN ORGANIZED HEALTH CARE EDUCATION/TRAINING PROGRAM

## 2021-12-09 RX ORDER — VALACYCLOVIR HYDROCHLORIDE 500 MG/1
1000 TABLET, FILM COATED ORAL 2 TIMES DAILY
Qty: 28 TABLET | Refills: 0 | Status: SHIPPED | OUTPATIENT
Start: 2021-12-09 | End: 2022-06-16 | Stop reason: ALTCHOICE

## 2021-12-09 RX ORDER — NYSTATIN 100000 [USP'U]/ML
SUSPENSION ORAL
COMMUNITY
Start: 2021-10-21 | End: 2023-09-18

## 2021-12-10 ENCOUNTER — LAB VISIT (OUTPATIENT)
Dept: LAB | Facility: HOSPITAL | Age: 21
End: 2021-12-10
Payer: COMMERCIAL

## 2021-12-10 DIAGNOSIS — D75.1 POLYCYTHEMIA: ICD-10-CM

## 2021-12-10 LAB
BASOPHILS # BLD AUTO: 0.05 K/UL (ref 0–0.2)
BASOPHILS NFR BLD: 0.8 % (ref 0–1.9)
DIFFERENTIAL METHOD: ABNORMAL
EOSINOPHIL # BLD AUTO: 0.2 K/UL (ref 0–0.5)
EOSINOPHIL NFR BLD: 2.9 % (ref 0–8)
ERYTHROCYTE [DISTWIDTH] IN BLOOD BY AUTOMATED COUNT: 16 % (ref 11.5–14.5)
HCT VFR BLD AUTO: 51.9 % (ref 40–54)
HGB BLD-MCNC: 16.9 G/DL (ref 14–18)
IMM GRANULOCYTES # BLD AUTO: 0.03 K/UL (ref 0–0.04)
IMM GRANULOCYTES NFR BLD AUTO: 0.5 % (ref 0–0.5)
LYMPHOCYTES # BLD AUTO: 2.4 K/UL (ref 1–4.8)
LYMPHOCYTES NFR BLD: 38.5 % (ref 18–48)
MCH RBC QN AUTO: 33.1 PG (ref 27–31)
MCHC RBC AUTO-ENTMCNC: 32.6 G/DL (ref 32–36)
MCV RBC AUTO: 102 FL (ref 82–98)
MONOCYTES # BLD AUTO: 0.7 K/UL (ref 0.3–1)
MONOCYTES NFR BLD: 11 % (ref 4–15)
NEUTROPHILS # BLD AUTO: 2.9 K/UL (ref 1.8–7.7)
NEUTROPHILS NFR BLD: 46.3 % (ref 38–73)
NRBC BLD-RTO: 0 /100 WBC
PLATELET # BLD AUTO: 170 K/UL (ref 150–450)
PMV BLD AUTO: 8.9 FL (ref 9.2–12.9)
RBC # BLD AUTO: 5.11 M/UL (ref 4.6–6.2)
WBC # BLD AUTO: 6.16 K/UL (ref 3.9–12.7)

## 2021-12-10 PROCEDURE — 85025 COMPLETE CBC W/AUTO DIFF WBC: CPT | Performed by: NURSE PRACTITIONER

## 2021-12-10 PROCEDURE — 36415 COLL VENOUS BLD VENIPUNCTURE: CPT | Performed by: NURSE PRACTITIONER

## 2021-12-11 LAB
C TRACH DNA SPEC QL NAA+PROBE: NOT DETECTED
N GONORRHOEA DNA SPEC QL NAA+PROBE: NOT DETECTED

## 2021-12-14 LAB
C TRACH RRNA SPEC QL NAA+PROBE: NEGATIVE
C TRACH RRNA SPEC QL NAA+PROBE: NEGATIVE
N GONORRHOEA RRNA SPEC QL NAA+PROBE: NEGATIVE
N GONORRHOEA RRNA SPEC QL NAA+PROBE: NEGATIVE
N.GONORROHEAE, AMP RNA SOURCE: NORMAL
N.GONORROHEAE, AMP RNA SOURCE: NORMAL
SPECIMEN SOURCE: NORMAL
SPECIMEN SOURCE: NORMAL

## 2021-12-15 ENCOUNTER — OFFICE VISIT (OUTPATIENT)
Dept: SURGERY | Facility: CLINIC | Age: 21
End: 2021-12-15
Payer: COMMERCIAL

## 2021-12-15 ENCOUNTER — OFFICE VISIT (OUTPATIENT)
Dept: DERMATOLOGY | Facility: CLINIC | Age: 21
End: 2021-12-15
Payer: COMMERCIAL

## 2021-12-15 VITALS
HEART RATE: 123 BPM | SYSTOLIC BLOOD PRESSURE: 125 MMHG | WEIGHT: 134.94 LBS | DIASTOLIC BLOOD PRESSURE: 73 MMHG | BODY MASS INDEX: 19.36 KG/M2

## 2021-12-15 DIAGNOSIS — B20 HIV INFECTION, UNSPECIFIED SYMPTOM STATUS: ICD-10-CM

## 2021-12-15 DIAGNOSIS — L90.6 STRIAE: ICD-10-CM

## 2021-12-15 DIAGNOSIS — B07.8 OTHER VIRAL WARTS: Primary | ICD-10-CM

## 2021-12-15 DIAGNOSIS — R19.5 MUCOUS IN STOOLS: ICD-10-CM

## 2021-12-15 DIAGNOSIS — K60.2 ANAL FISSURE: Primary | ICD-10-CM

## 2021-12-15 PROCEDURE — 99204 OFFICE O/P NEW MOD 45 MIN: CPT | Mod: S$GLB,,, | Performed by: NURSE PRACTITIONER

## 2021-12-15 PROCEDURE — 99999 PR PBB SHADOW E&M-EST. PATIENT-LVL III: CPT | Mod: PBBFAC,,, | Performed by: DERMATOLOGY

## 2021-12-15 PROCEDURE — 99204 PR OFFICE/OUTPT VISIT, NEW, LEVL IV, 45-59 MIN: ICD-10-PCS | Mod: S$GLB,,, | Performed by: NURSE PRACTITIONER

## 2021-12-15 PROCEDURE — 99203 PR OFFICE/OUTPT VISIT, NEW, LEVL III, 30-44 MIN: ICD-10-PCS | Mod: 25,S$GLB,, | Performed by: DERMATOLOGY

## 2021-12-15 PROCEDURE — 11102 TANGNTL BX SKIN SINGLE LES: CPT | Mod: S$GLB,,, | Performed by: DERMATOLOGY

## 2021-12-15 PROCEDURE — 99999 PR PBB SHADOW E&M-EST. PATIENT-LVL III: ICD-10-PCS | Mod: PBBFAC,,, | Performed by: DERMATOLOGY

## 2021-12-15 PROCEDURE — 88305 TISSUE EXAM BY PATHOLOGIST: CPT | Performed by: PATHOLOGY

## 2021-12-15 PROCEDURE — 88305 TISSUE EXAM BY PATHOLOGIST: ICD-10-PCS | Mod: 26,,, | Performed by: PATHOLOGY

## 2021-12-15 PROCEDURE — 11102 PR TANGENTIAL BIOPSY, SKIN, SINGLE LESION: ICD-10-PCS | Mod: S$GLB,,, | Performed by: DERMATOLOGY

## 2021-12-15 PROCEDURE — 99999 PR PBB SHADOW E&M-EST. PATIENT-LVL III: ICD-10-PCS | Mod: PBBFAC,,, | Performed by: NURSE PRACTITIONER

## 2021-12-15 PROCEDURE — 99999 PR PBB SHADOW E&M-EST. PATIENT-LVL III: CPT | Mod: PBBFAC,,, | Performed by: NURSE PRACTITIONER

## 2021-12-15 PROCEDURE — 88305 TISSUE EXAM BY PATHOLOGIST: CPT | Mod: 26,,, | Performed by: PATHOLOGY

## 2021-12-15 PROCEDURE — 99203 OFFICE O/P NEW LOW 30 MIN: CPT | Mod: 25,S$GLB,, | Performed by: DERMATOLOGY

## 2021-12-16 ENCOUNTER — PATIENT MESSAGE (OUTPATIENT)
Dept: HEMATOLOGY/ONCOLOGY | Facility: CLINIC | Age: 21
End: 2021-12-16
Payer: COMMERCIAL

## 2021-12-16 ENCOUNTER — LAB VISIT (OUTPATIENT)
Dept: LAB | Facility: HOSPITAL | Age: 21
End: 2021-12-16
Attending: INTERNAL MEDICINE
Payer: COMMERCIAL

## 2021-12-16 ENCOUNTER — TELEPHONE (OUTPATIENT)
Dept: DERMATOLOGY | Facility: CLINIC | Age: 21
End: 2021-12-16
Payer: COMMERCIAL

## 2021-12-16 DIAGNOSIS — D75.1 POLYCYTHEMIA: ICD-10-CM

## 2021-12-16 DIAGNOSIS — B20 HIV INFECTION, UNSPECIFIED SYMPTOM STATUS: ICD-10-CM

## 2021-12-16 LAB
ALBUMIN SERPL BCP-MCNC: 4.3 G/DL (ref 3.5–5.2)
ALP SERPL-CCNC: 107 U/L (ref 55–135)
ALT SERPL W/O P-5'-P-CCNC: 11 U/L (ref 10–44)
ANION GAP SERPL CALC-SCNC: 12 MMOL/L (ref 8–16)
AST SERPL-CCNC: 16 U/L (ref 10–40)
BASOPHILS # BLD AUTO: 0.07 K/UL (ref 0–0.2)
BASOPHILS NFR BLD: 0.9 % (ref 0–1.9)
BILIRUB SERPL-MCNC: 0.8 MG/DL (ref 0.1–1)
BUN SERPL-MCNC: 9 MG/DL (ref 6–20)
CALCIUM SERPL-MCNC: 9.6 MG/DL (ref 8.7–10.5)
CHLORIDE SERPL-SCNC: 102 MMOL/L (ref 95–110)
CO2 SERPL-SCNC: 24 MMOL/L (ref 23–29)
CREAT SERPL-MCNC: 0.9 MG/DL (ref 0.5–1.4)
DIFFERENTIAL METHOD: ABNORMAL
EOSINOPHIL # BLD AUTO: 0.1 K/UL (ref 0–0.5)
EOSINOPHIL NFR BLD: 1.8 % (ref 0–8)
ERYTHROCYTE [DISTWIDTH] IN BLOOD BY AUTOMATED COUNT: 15.6 % (ref 11.5–14.5)
EST. GFR  (AFRICAN AMERICAN): >60 ML/MIN/1.73 M^2
EST. GFR  (NON AFRICAN AMERICAN): >60 ML/MIN/1.73 M^2
GLUCOSE SERPL-MCNC: 89 MG/DL (ref 70–110)
HCT VFR BLD AUTO: 55.9 % (ref 40–54)
HGB BLD-MCNC: 18.6 G/DL (ref 14–18)
IMM GRANULOCYTES # BLD AUTO: 0.06 K/UL (ref 0–0.04)
IMM GRANULOCYTES NFR BLD AUTO: 0.8 % (ref 0–0.5)
LYMPHOCYTES # BLD AUTO: 2.7 K/UL (ref 1–4.8)
LYMPHOCYTES NFR BLD: 35 % (ref 18–48)
MCH RBC QN AUTO: 33.3 PG (ref 27–31)
MCHC RBC AUTO-ENTMCNC: 33.3 G/DL (ref 32–36)
MCV RBC AUTO: 100 FL (ref 82–98)
MONOCYTES # BLD AUTO: 0.8 K/UL (ref 0.3–1)
MONOCYTES NFR BLD: 10.5 % (ref 4–15)
NEUTROPHILS # BLD AUTO: 3.9 K/UL (ref 1.8–7.7)
NEUTROPHILS NFR BLD: 51 % (ref 38–73)
NRBC BLD-RTO: 0 /100 WBC
PLATELET # BLD AUTO: 220 K/UL (ref 150–450)
PLATELET BLD QL SMEAR: ABNORMAL
PMV BLD AUTO: 9.2 FL (ref 9.2–12.9)
POTASSIUM SERPL-SCNC: 4.6 MMOL/L (ref 3.5–5.1)
PROT SERPL-MCNC: 8.5 G/DL (ref 6–8.4)
RBC # BLD AUTO: 5.59 M/UL (ref 4.6–6.2)
SODIUM SERPL-SCNC: 138 MMOL/L (ref 136–145)
WBC # BLD AUTO: 7.71 K/UL (ref 3.9–12.7)

## 2021-12-16 PROCEDURE — 86592 SYPHILIS TEST NON-TREP QUAL: CPT | Performed by: STUDENT IN AN ORGANIZED HEALTH CARE EDUCATION/TRAINING PROGRAM

## 2021-12-16 PROCEDURE — 85025 COMPLETE CBC W/AUTO DIFF WBC: CPT | Performed by: NURSE PRACTITIONER

## 2021-12-16 PROCEDURE — 86780 TREPONEMA PALLIDUM: CPT | Performed by: STUDENT IN AN ORGANIZED HEALTH CARE EDUCATION/TRAINING PROGRAM

## 2021-12-16 PROCEDURE — 80053 COMPREHEN METABOLIC PANEL: CPT | Performed by: STUDENT IN AN ORGANIZED HEALTH CARE EDUCATION/TRAINING PROGRAM

## 2021-12-16 PROCEDURE — 87522 HEPATITIS C REVRS TRNSCRPJ: CPT | Performed by: STUDENT IN AN ORGANIZED HEALTH CARE EDUCATION/TRAINING PROGRAM

## 2021-12-16 PROCEDURE — 36415 COLL VENOUS BLD VENIPUNCTURE: CPT | Performed by: STUDENT IN AN ORGANIZED HEALTH CARE EDUCATION/TRAINING PROGRAM

## 2021-12-16 PROCEDURE — 87536 HIV-1 QUANT&REVRSE TRNSCRPJ: CPT | Performed by: STUDENT IN AN ORGANIZED HEALTH CARE EDUCATION/TRAINING PROGRAM

## 2021-12-17 DIAGNOSIS — D75.1 POLYCYTHEMIA: Primary | ICD-10-CM

## 2021-12-17 LAB — RPR SER QL: NORMAL

## 2021-12-18 LAB
HEPATITIS C VIRUS (HCV) RNA DETECTION/QUANTIFICATION RT-PCR: NORMAL IU/ML
HIV1 RNA # PLAS NAA DL=20: 57 COPIES/ML

## 2021-12-20 ENCOUNTER — HOSPITAL ENCOUNTER (OUTPATIENT)
Dept: TRANSFUSION MEDICINE | Facility: HOSPITAL | Age: 21
Discharge: HOME | End: 2021-12-20
Payer: COMMERCIAL

## 2021-12-20 DIAGNOSIS — D75.1 POLYCYTHEMIA: ICD-10-CM

## 2021-12-20 LAB
HCV GENTYP SERPL NAA+PROBE: NORMAL
HCV RNA SERPL NAA+PROBE-LOG IU: <1.08 LOG (10) IU/ML
HCV RNA SERPL QL NAA+PROBE: NOT DETECTED
HCV RNA SPEC NAA+PROBE-ACNC: <12 IU/ML

## 2021-12-20 PROCEDURE — 99195 PHLEBOTOMY: CPT

## 2021-12-21 LAB — T PALLIDUM AB SER QL IF: NORMAL

## 2021-12-22 ENCOUNTER — TELEPHONE (OUTPATIENT)
Dept: HEPATOLOGY | Facility: CLINIC | Age: 21
End: 2021-12-22
Payer: COMMERCIAL

## 2021-12-22 LAB
FINAL PATHOLOGIC DIAGNOSIS: NORMAL
GROSS: NORMAL
Lab: NORMAL
MICROSCOPIC EXAM: NORMAL

## 2021-12-23 ENCOUNTER — HOSPITAL ENCOUNTER (EMERGENCY)
Facility: HOSPITAL | Age: 21
Discharge: HOME | End: 2021-12-23
Attending: EMERGENCY MEDICINE
Payer: COMMERCIAL

## 2021-12-23 ENCOUNTER — PATIENT MESSAGE (OUTPATIENT)
Dept: INFECTIOUS DISEASES | Facility: CLINIC | Age: 21
End: 2021-12-23
Payer: COMMERCIAL

## 2021-12-23 VITALS
WEIGHT: 134 LBS | HEART RATE: 107 BPM | OXYGEN SATURATION: 99 % | TEMPERATURE: 99 F | SYSTOLIC BLOOD PRESSURE: 113 MMHG | BODY MASS INDEX: 19.23 KG/M2 | RESPIRATION RATE: 18 BRPM | DIASTOLIC BLOOD PRESSURE: 70 MMHG

## 2021-12-23 DIAGNOSIS — R07.89 CHEST WALL PAIN: ICD-10-CM
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
## 2021-12-23 DIAGNOSIS — S06.0X0A CONCUSSION WITHOUT LOSS OF CONSCIOUSNESS, INITIAL ENCOUNTER: ICD-10-CM

## 2021-12-23 LAB
BASOPHILS # BLD AUTO: 0.07 K/UL (ref 0–0.2)
BASOPHILS NFR BLD: 0.8 % (ref 0–1.9)
DIFFERENTIAL METHOD: ABNORMAL
EOSINOPHIL # BLD AUTO: 0.2 K/UL (ref 0–0.5)
EOSINOPHIL NFR BLD: 1.8 % (ref 0–8)
ERYTHROCYTE [DISTWIDTH] IN BLOOD BY AUTOMATED COUNT: 15.8 % (ref 11.5–14.5)
HCT VFR BLD AUTO: 51.9 % (ref 40–54)
HGB BLD-MCNC: 16.9 G/DL (ref 14–18)
IMM GRANULOCYTES # BLD AUTO: 0.04 K/UL (ref 0–0.04)
IMM GRANULOCYTES NFR BLD AUTO: 0.4 % (ref 0–0.5)
LYMPHOCYTES # BLD AUTO: 3.6 K/UL (ref 1–4.8)
LYMPHOCYTES NFR BLD: 40.5 % (ref 18–48)
MCH RBC QN AUTO: 33.1 PG (ref 27–31)
MCHC RBC AUTO-ENTMCNC: 32.6 G/DL (ref 32–36)
MCV RBC AUTO: 102 FL (ref 82–98)
MONOCYTES # BLD AUTO: 0.8 K/UL (ref 0.3–1)
MONOCYTES NFR BLD: 9 % (ref 4–15)
NEUTROPHILS # BLD AUTO: 4.3 K/UL (ref 1.8–7.7)
NEUTROPHILS NFR BLD: 47.5 % (ref 38–73)
NRBC BLD-RTO: 0 /100 WBC
PLATELET # BLD AUTO: 270 K/UL (ref 150–450)
PMV BLD AUTO: 8.7 FL (ref 9.2–12.9)
RBC # BLD AUTO: 5.1 M/UL (ref 4.6–6.2)
WBC # BLD AUTO: 8.98 K/UL (ref 3.9–12.7)

## 2021-12-23 PROCEDURE — 99284 EMERGENCY DEPT VISIT MOD MDM: CPT | Mod: 25

## 2021-12-23 PROCEDURE — 85025 COMPLETE CBC W/AUTO DIFF WBC: CPT | Performed by: PHYSICIAN ASSISTANT

## 2021-12-23 PROCEDURE — 99284 PR EMERGENCY DEPT VISIT,LEVEL IV: ICD-10-PCS | Mod: ,,, | Performed by: PHYSICIAN ASSISTANT

## 2021-12-23 PROCEDURE — 99284 EMERGENCY DEPT VISIT MOD MDM: CPT | Mod: ,,, | Performed by: PHYSICIAN ASSISTANT

## 2021-12-23 PROCEDURE — 25000003 PHARM REV CODE 250: Performed by: PHYSICIAN ASSISTANT

## 2021-12-23 RX ORDER — ACETAMINOPHEN 500 MG
1000 TABLET ORAL
Status: COMPLETED | OUTPATIENT
Start: 2021-12-23 | End: 2021-12-23

## 2021-12-23 RX ORDER — METHOCARBAMOL 500 MG/1
1000 TABLET, FILM COATED ORAL
Status: COMPLETED | OUTPATIENT
Start: 2021-12-23 | End: 2021-12-23

## 2021-12-23 RX ORDER — LIDOCAINE 50 MG/G
2 PATCH TOPICAL
Status: DISCONTINUED | OUTPATIENT
Start: 2021-12-23 | End: 2021-12-23 | Stop reason: HOSPADM

## 2021-12-23 RX ORDER — NAPROXEN 500 MG/1
500 TABLET ORAL 2 TIMES DAILY PRN
Qty: 30 TABLET | Refills: 0 | Status: SHIPPED | OUTPATIENT
Start: 2021-12-23 | End: 2022-10-18

## 2021-12-23 RX ORDER — ONDANSETRON 4 MG/1
4 TABLET, ORALLY DISINTEGRATING ORAL EVERY 6 HOURS PRN
Qty: 15 TABLET | Refills: 0 | Status: SHIPPED | OUTPATIENT
Start: 2021-12-23 | End: 2022-10-18

## 2021-12-23 RX ORDER — LIDOCAINE 50 MG/G
1 PATCH TOPICAL DAILY
Qty: 15 PATCH | Refills: 2 | Status: SHIPPED | OUTPATIENT
Start: 2021-12-23 | End: 2022-10-18

## 2021-12-23 RX ORDER — METHOCARBAMOL 500 MG/1
1000 TABLET, FILM COATED ORAL 3 TIMES DAILY PRN
Qty: 30 TABLET | Refills: 0 | Status: SHIPPED | OUTPATIENT
Start: 2021-12-23 | End: 2021-12-28

## 2021-12-23 RX ADMIN — METHOCARBAMOL 1000 MG: 500 TABLET ORAL at 02:12

## 2021-12-23 RX ADMIN — ACETAMINOPHEN 1000 MG: 500 TABLET ORAL at 02:12

## 2021-12-23 RX ADMIN — LIDOCAINE 5% 2 PATCH: 700 PATCH TOPICAL at 02:12
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
## 2022-01-03 ENCOUNTER — PATIENT MESSAGE (OUTPATIENT)
Dept: HEMATOLOGY/ONCOLOGY | Facility: CLINIC | Age: 22
End: 2022-01-03
Payer: COMMERCIAL

## 2022-01-03 ENCOUNTER — LAB VISIT (OUTPATIENT)
Dept: LAB | Facility: HOSPITAL | Age: 22
End: 2022-01-03
Attending: INTERNAL MEDICINE
Payer: COMMERCIAL

## 2022-01-03 DIAGNOSIS — D75.1 POLYCYTHEMIA: Primary | ICD-10-CM

## 2022-01-03 DIAGNOSIS — D75.1 POLYCYTHEMIA: ICD-10-CM

## 2022-01-03 LAB
BASOPHILS # BLD AUTO: 0.04 K/UL (ref 0–0.2)
BASOPHILS NFR BLD: 0.5 % (ref 0–1.9)
DIFFERENTIAL METHOD: ABNORMAL
EOSINOPHIL # BLD AUTO: 0.2 K/UL (ref 0–0.5)
EOSINOPHIL NFR BLD: 1.9 % (ref 0–8)
ERYTHROCYTE [DISTWIDTH] IN BLOOD BY AUTOMATED COUNT: 14.6 % (ref 11.5–14.5)
HCT VFR BLD AUTO: 50 % (ref 40–54)
HGB BLD-MCNC: 16.3 G/DL (ref 14–18)
IMM GRANULOCYTES # BLD AUTO: 0.02 K/UL (ref 0–0.04)
IMM GRANULOCYTES NFR BLD AUTO: 0.2 % (ref 0–0.5)
LYMPHOCYTES # BLD AUTO: 2.2 K/UL (ref 1–4.8)
LYMPHOCYTES NFR BLD: 26.6 % (ref 18–48)
MCH RBC QN AUTO: 33.1 PG (ref 27–31)
MCHC RBC AUTO-ENTMCNC: 32.6 G/DL (ref 32–36)
MCV RBC AUTO: 101 FL (ref 82–98)
MONOCYTES # BLD AUTO: 0.8 K/UL (ref 0.3–1)
MONOCYTES NFR BLD: 9.3 % (ref 4–15)
NEUTROPHILS # BLD AUTO: 5.1 K/UL (ref 1.8–7.7)
NEUTROPHILS NFR BLD: 61.5 % (ref 38–73)
NRBC BLD-RTO: 0 /100 WBC
PLATELET # BLD AUTO: 226 K/UL (ref 150–450)
PMV BLD AUTO: 9.3 FL (ref 9.2–12.9)
RBC # BLD AUTO: 4.93 M/UL (ref 4.6–6.2)
WBC # BLD AUTO: 8.36 K/UL (ref 3.9–12.7)

## 2022-01-03 PROCEDURE — 36415 COLL VENOUS BLD VENIPUNCTURE: CPT | Performed by: NURSE PRACTITIONER

## 2022-01-03 PROCEDURE — 85025 COMPLETE CBC W/AUTO DIFF WBC: CPT | Performed by: NURSE PRACTITIONER

## 2022-01-04 ENCOUNTER — HOSPITAL ENCOUNTER (OUTPATIENT)
Dept: TRANSFUSION MEDICINE | Facility: HOSPITAL | Age: 22
Discharge: HOME | End: 2022-01-04
Payer: COMMERCIAL

## 2022-01-04 PROCEDURE — 99195 PHLEBOTOMY: CPT

## 2022-01-06 NOTE — PROGRESS NOTES
Pt to Blood Bank for therapeutic phlebotomy.  /68  P 96  T 97.2 Hct 50.  1 unit WB removed from  L arm.  Tolerated well.  Written/verbal instructions given.  Pressure wrap applied when hemostasis achieved.  Refreshments accepted.  Ambulatory to and from BB per self.

## 2022-01-13 ENCOUNTER — LAB VISIT (OUTPATIENT)
Dept: LAB | Facility: HOSPITAL | Age: 22
End: 2022-01-13
Payer: COMMERCIAL

## 2022-01-13 DIAGNOSIS — D75.1 POLYCYTHEMIA: ICD-10-CM

## 2022-01-13 LAB
BASOPHILS # BLD AUTO: 0.04 K/UL (ref 0–0.2)
BASOPHILS NFR BLD: 0.5 % (ref 0–1.9)
DIFFERENTIAL METHOD: ABNORMAL
EOSINOPHIL # BLD AUTO: 0.1 K/UL (ref 0–0.5)
EOSINOPHIL NFR BLD: 1.1 % (ref 0–8)
ERYTHROCYTE [DISTWIDTH] IN BLOOD BY AUTOMATED COUNT: 14.9 % (ref 11.5–14.5)
HCT VFR BLD AUTO: 46.2 % (ref 40–54)
HGB BLD-MCNC: 15 G/DL (ref 14–18)
IMM GRANULOCYTES # BLD AUTO: 0.04 K/UL (ref 0–0.04)
IMM GRANULOCYTES NFR BLD AUTO: 0.5 % (ref 0–0.5)
LYMPHOCYTES # BLD AUTO: 3 K/UL (ref 1–4.8)
LYMPHOCYTES NFR BLD: 34.3 % (ref 18–48)
MCH RBC QN AUTO: 33.4 PG (ref 27–31)
MCHC RBC AUTO-ENTMCNC: 32.5 G/DL (ref 32–36)
MCV RBC AUTO: 103 FL (ref 82–98)
MONOCYTES # BLD AUTO: 1 K/UL (ref 0.3–1)
MONOCYTES NFR BLD: 10.9 % (ref 4–15)
NEUTROPHILS # BLD AUTO: 4.6 K/UL (ref 1.8–7.7)
NEUTROPHILS NFR BLD: 52.7 % (ref 38–73)
NRBC BLD-RTO: 0 /100 WBC
PLATELET # BLD AUTO: 270 K/UL (ref 150–450)
PMV BLD AUTO: 9.4 FL (ref 9.2–12.9)
RBC # BLD AUTO: 4.49 M/UL (ref 4.6–6.2)
WBC # BLD AUTO: 8.7 K/UL (ref 3.9–12.7)

## 2022-01-13 PROCEDURE — 36415 COLL VENOUS BLD VENIPUNCTURE: CPT | Performed by: NURSE PRACTITIONER

## 2022-01-13 PROCEDURE — 85025 COMPLETE CBC W/AUTO DIFF WBC: CPT | Performed by: NURSE PRACTITIONER

## 2022-01-27 ENCOUNTER — PATIENT MESSAGE (OUTPATIENT)
Dept: HEMATOLOGY/ONCOLOGY | Facility: CLINIC | Age: 22
End: 2022-01-27
Payer: COMMERCIAL

## 2022-01-27 ENCOUNTER — LAB VISIT (OUTPATIENT)
Dept: LAB | Facility: HOSPITAL | Age: 22
End: 2022-01-27
Attending: INTERNAL MEDICINE
Payer: COMMERCIAL

## 2022-01-27 DIAGNOSIS — D75.1 POLYCYTHEMIA: ICD-10-CM

## 2022-01-27 LAB
BASOPHILS # BLD AUTO: 0.06 K/UL (ref 0–0.2)
BASOPHILS NFR BLD: 0.8 % (ref 0–1.9)
DIFFERENTIAL METHOD: ABNORMAL
EOSINOPHIL # BLD AUTO: 0.2 K/UL (ref 0–0.5)
EOSINOPHIL NFR BLD: 2.3 % (ref 0–8)
ERYTHROCYTE [DISTWIDTH] IN BLOOD BY AUTOMATED COUNT: 13.6 % (ref 11.5–14.5)
HCT VFR BLD AUTO: 52.3 % (ref 40–54)
HGB BLD-MCNC: 17.2 G/DL (ref 14–18)
IMM GRANULOCYTES # BLD AUTO: 0.02 K/UL (ref 0–0.04)
IMM GRANULOCYTES NFR BLD AUTO: 0.3 % (ref 0–0.5)
LYMPHOCYTES # BLD AUTO: 3.6 K/UL (ref 1–4.8)
LYMPHOCYTES NFR BLD: 48.7 % (ref 18–48)
MCH RBC QN AUTO: 33 PG (ref 27–31)
MCHC RBC AUTO-ENTMCNC: 32.9 G/DL (ref 32–36)
MCV RBC AUTO: 100 FL (ref 82–98)
MONOCYTES # BLD AUTO: 0.7 K/UL (ref 0.3–1)
MONOCYTES NFR BLD: 9.7 % (ref 4–15)
NEUTROPHILS # BLD AUTO: 2.8 K/UL (ref 1.8–7.7)
NEUTROPHILS NFR BLD: 38.2 % (ref 38–73)
NRBC BLD-RTO: 0 /100 WBC
PLATELET # BLD AUTO: 311 K/UL (ref 150–450)
PMV BLD AUTO: 9 FL (ref 9.2–12.9)
RBC # BLD AUTO: 5.21 M/UL (ref 4.6–6.2)
WBC # BLD AUTO: 7.39 K/UL (ref 3.9–12.7)

## 2022-01-27 PROCEDURE — 36415 COLL VENOUS BLD VENIPUNCTURE: CPT | Performed by: NURSE PRACTITIONER

## 2022-01-27 PROCEDURE — 85025 COMPLETE CBC W/AUTO DIFF WBC: CPT | Performed by: NURSE PRACTITIONER

## 2022-01-31 ENCOUNTER — OFFICE VISIT (OUTPATIENT)
Dept: HEMATOLOGY/ONCOLOGY | Facility: CLINIC | Age: 22
End: 2022-01-31
Payer: COMMERCIAL

## 2022-01-31 ENCOUNTER — LAB VISIT (OUTPATIENT)
Dept: LAB | Facility: HOSPITAL | Age: 22
End: 2022-01-31
Payer: COMMERCIAL

## 2022-01-31 VITALS
HEART RATE: 99 BPM | SYSTOLIC BLOOD PRESSURE: 111 MMHG | RESPIRATION RATE: 12 BRPM | DIASTOLIC BLOOD PRESSURE: 61 MMHG | WEIGHT: 136.56 LBS | OXYGEN SATURATION: 96 % | BODY MASS INDEX: 19.55 KG/M2 | TEMPERATURE: 99 F | HEIGHT: 70 IN

## 2022-01-31 DIAGNOSIS — B20 HIV INFECTION, UNSPECIFIED SYMPTOM STATUS: ICD-10-CM

## 2022-01-31 DIAGNOSIS — D75.1 POLYCYTHEMIA: ICD-10-CM

## 2022-01-31 DIAGNOSIS — D75.1 POLYCYTHEMIA: Primary | ICD-10-CM

## 2022-01-31 LAB
BASOPHILS # BLD AUTO: 0.06 K/UL (ref 0–0.2)
BASOPHILS NFR BLD: 0.7 % (ref 0–1.9)
DIFFERENTIAL METHOD: ABNORMAL
EOSINOPHIL # BLD AUTO: 0.2 K/UL (ref 0–0.5)
EOSINOPHIL NFR BLD: 1.7 % (ref 0–8)
ERYTHROCYTE [DISTWIDTH] IN BLOOD BY AUTOMATED COUNT: 13.3 % (ref 11.5–14.5)
HCT VFR BLD AUTO: 46.8 % (ref 40–54)
HGB BLD-MCNC: 15 G/DL (ref 14–18)
IMM GRANULOCYTES # BLD AUTO: 0.02 K/UL (ref 0–0.04)
IMM GRANULOCYTES NFR BLD AUTO: 0.2 % (ref 0–0.5)
LYMPHOCYTES # BLD AUTO: 3.3 K/UL (ref 1–4.8)
LYMPHOCYTES NFR BLD: 37.3 % (ref 18–48)
MCH RBC QN AUTO: 32.5 PG (ref 27–31)
MCHC RBC AUTO-ENTMCNC: 32.1 G/DL (ref 32–36)
MCV RBC AUTO: 101 FL (ref 82–98)
MONOCYTES # BLD AUTO: 0.9 K/UL (ref 0.3–1)
MONOCYTES NFR BLD: 9.8 % (ref 4–15)
NEUTROPHILS # BLD AUTO: 4.5 K/UL (ref 1.8–7.7)
NEUTROPHILS NFR BLD: 50.3 % (ref 38–73)
NRBC BLD-RTO: 0 /100 WBC
PLATELET # BLD AUTO: 277 K/UL (ref 150–450)
PMV BLD AUTO: 9.5 FL (ref 9.2–12.9)
RBC # BLD AUTO: 4.62 M/UL (ref 4.6–6.2)
WBC # BLD AUTO: 8.88 K/UL (ref 3.9–12.7)

## 2022-01-31 PROCEDURE — 99215 PR OFFICE/OUTPT VISIT, EST, LEVL V, 40-54 MIN: ICD-10-PCS | Mod: S$GLB,,, | Performed by: INTERNAL MEDICINE

## 2022-01-31 PROCEDURE — 99215 OFFICE O/P EST HI 40 MIN: CPT | Mod: S$GLB,,, | Performed by: INTERNAL MEDICINE

## 2022-01-31 PROCEDURE — 85025 COMPLETE CBC W/AUTO DIFF WBC: CPT | Performed by: INTERNAL MEDICINE

## 2022-01-31 PROCEDURE — 99999 PR PBB SHADOW E&M-EST. PATIENT-LVL IV: CPT | Mod: PBBFAC,,, | Performed by: INTERNAL MEDICINE

## 2022-01-31 PROCEDURE — 99999 PR PBB SHADOW E&M-EST. PATIENT-LVL IV: ICD-10-PCS | Mod: PBBFAC,,, | Performed by: INTERNAL MEDICINE

## 2022-01-31 PROCEDURE — 36415 COLL VENOUS BLD VENIPUNCTURE: CPT | Performed by: INTERNAL MEDICINE

## 2022-01-31 RX ORDER — LIDOCAINE HYDROCHLORIDE 30 MG/G
CREAM TOPICAL
COMMUNITY
End: 2022-10-18
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
## 2022-02-08 DIAGNOSIS — B20 HIV INFECTION, UNSPECIFIED SYMPTOM STATUS: Primary | ICD-10-CM

## 2022-02-09 RX ORDER — BICTEGRAVIR SODIUM, EMTRICITABINE, AND TENOFOVIR ALAFENAMIDE FUMARATE 50; 200; 25 MG/1; MG/1; MG/1
1 TABLET ORAL DAILY
Qty: 30 TABLET | Refills: 5 | Status: SHIPPED | OUTPATIENT
Start: 2022-02-09 | End: 2022-06-16 | Stop reason: SDUPTHER

## 2022-02-09 NOTE — TELEPHONE ENCOUNTER
----- Message from Grant Johnson sent at 2/9/2022 12:25 PM CST -----  Regarding: Prescription Refill  BIKTARVY 50/200/25M 1 tab by mouth 1x a day      [facility] [place] [facility] (Ph: [phone])      Pharmacy called to refill the above prescription

## 2022-02-11 ENCOUNTER — TELEPHONE (OUTPATIENT)
Dept: INFECTIOUS DISEASES | Facility: CLINIC | Age: 22
End: 2022-02-11
Payer: COMMERCIAL

## 2022-02-22 ENCOUNTER — PATIENT MESSAGE (OUTPATIENT)
Dept: INFECTIOUS DISEASES | Facility: CLINIC | Age: 22
End: 2022-02-22

## 2022-03-15 ENCOUNTER — HOSPITAL ENCOUNTER (OUTPATIENT)
Dept: TRANSFUSION MEDICINE | Facility: HOSPITAL | Age: 22
Discharge: HOME | End: 2022-03-15
Payer: COMMERCIAL

## 2022-03-15 DIAGNOSIS — D75.1 POLYCYTHEMIA: ICD-10-CM

## 2022-03-15 PROCEDURE — 99195 PHLEBOTOMY: CPT

## 2022-03-16 NOTE — PROGRESS NOTES
Pt presented to donor room ambulatory for a therapeutic phlebotomy.  Vital signs; b/p 115/82, pulse 94, temp 97.2, hematocrit 45% and weight 135 lbs.  490 mls of whole blood was drawn from right ac vein.  Pressure dressing applied after hemostasis was achieved.  Tolerated well.  Pt exited dept ambulatory by himself.

## 2022-03-30 ENCOUNTER — PATIENT MESSAGE (OUTPATIENT)
Dept: INFECTIOUS DISEASES | Facility: CLINIC | Age: 22
End: 2022-03-30
Payer: COMMERCIAL

## 2022-03-30 DIAGNOSIS — B20 HIV INFECTION, UNSPECIFIED SYMPTOM STATUS: Primary | ICD-10-CM

## 2022-03-31 ENCOUNTER — TELEPHONE (OUTPATIENT)
Dept: INFECTIOUS DISEASES | Facility: CLINIC | Age: 22
End: 2022-03-31
Payer: COMMERCIAL

## 2022-04-12 ENCOUNTER — PATIENT MESSAGE (OUTPATIENT)
Dept: HEMATOLOGY/ONCOLOGY | Facility: CLINIC | Age: 22
End: 2022-04-12
Payer: COMMERCIAL

## 2022-04-12 ENCOUNTER — HOSPITAL ENCOUNTER (OUTPATIENT)
Dept: TRANSFUSION MEDICINE | Facility: HOSPITAL | Age: 22
Discharge: HOME | End: 2022-04-12
Payer: MEDICAID

## 2022-04-12 DIAGNOSIS — D75.1 POLYCYTHEMIA: ICD-10-CM

## 2022-04-14 ENCOUNTER — HOSPITAL ENCOUNTER (OUTPATIENT)
Dept: TRANSFUSION MEDICINE | Facility: HOSPITAL | Age: 22
Discharge: HOME | End: 2022-04-14
Attending: INTERNAL MEDICINE
Payer: MEDICAID

## 2022-04-14 DIAGNOSIS — D75.1 POLYCYTHEMIA: ICD-10-CM

## 2022-04-14 PROCEDURE — 99195 PHLEBOTOMY: CPT

## 2022-04-18 NOTE — PROGRESS NOTES
Pt to Blood Bank for therapeutic phlebotomy.  /65  P 101 T 97.5  Hct 41.  1 unit WB removed from  L arm.  Tolerated well.  Written/verbal instructions given.  Pressure wrap applied when hemostasis achieved.  Refreshments accepted.  Ambulatory to and from BB per self.

## 2022-04-20 ENCOUNTER — LAB VISIT (OUTPATIENT)
Dept: LAB | Facility: HOSPITAL | Age: 22
End: 2022-04-20
Payer: COMMERCIAL

## 2022-04-20 ENCOUNTER — OFFICE VISIT (OUTPATIENT)
Dept: HEMATOLOGY/ONCOLOGY | Facility: CLINIC | Age: 22
End: 2022-04-20
Payer: COMMERCIAL

## 2022-04-20 VITALS
HEIGHT: 70 IN | RESPIRATION RATE: 12 BRPM | DIASTOLIC BLOOD PRESSURE: 59 MMHG | OXYGEN SATURATION: 99 % | WEIGHT: 133.5 LBS | TEMPERATURE: 98 F | HEART RATE: 97 BPM | BODY MASS INDEX: 19.11 KG/M2 | SYSTOLIC BLOOD PRESSURE: 92 MMHG

## 2022-04-20 DIAGNOSIS — D75.1 POLYCYTHEMIA: ICD-10-CM

## 2022-04-20 DIAGNOSIS — D45 POLYCYTHEMIA VERA: Primary | ICD-10-CM

## 2022-04-20 DIAGNOSIS — B20 HIV INFECTION, UNSPECIFIED SYMPTOM STATUS: ICD-10-CM

## 2022-04-20 LAB
BASOPHILS # BLD AUTO: 0.06 K/UL (ref 0–0.2)
BASOPHILS NFR BLD: 0.6 % (ref 0–1.9)
DIFFERENTIAL METHOD: ABNORMAL
EOSINOPHIL # BLD AUTO: 0.2 K/UL (ref 0–0.5)
EOSINOPHIL NFR BLD: 1.8 % (ref 0–8)
ERYTHROCYTE [DISTWIDTH] IN BLOOD BY AUTOMATED COUNT: 14.3 % (ref 11.5–14.5)
HCT VFR BLD AUTO: 44 % (ref 40–54)
HGB BLD-MCNC: 14 G/DL (ref 14–18)
IMM GRANULOCYTES # BLD AUTO: 0.11 K/UL (ref 0–0.04)
IMM GRANULOCYTES NFR BLD AUTO: 1.1 % (ref 0–0.5)
LYMPHOCYTES # BLD AUTO: 5.7 K/UL (ref 1–4.8)
LYMPHOCYTES NFR BLD: 57.3 % (ref 18–48)
MCH RBC QN AUTO: 29.2 PG (ref 27–31)
MCHC RBC AUTO-ENTMCNC: 31.8 G/DL (ref 32–36)
MCV RBC AUTO: 92 FL (ref 82–98)
MONOCYTES # BLD AUTO: 0.8 K/UL (ref 0.3–1)
MONOCYTES NFR BLD: 7.5 % (ref 4–15)
NEUTROPHILS # BLD AUTO: 3.2 K/UL (ref 1.8–7.7)
NEUTROPHILS NFR BLD: 31.7 % (ref 38–73)
NRBC BLD-RTO: 0 /100 WBC
PLATELET # BLD AUTO: 276 K/UL (ref 150–450)
PMV BLD AUTO: 9.2 FL (ref 9.2–12.9)
RBC # BLD AUTO: 4.79 M/UL (ref 4.6–6.2)
WBC # BLD AUTO: 10.01 K/UL (ref 3.9–12.7)

## 2022-04-20 PROCEDURE — 85025 COMPLETE CBC W/AUTO DIFF WBC: CPT | Performed by: NURSE PRACTITIONER

## 2022-04-20 PROCEDURE — 99214 OFFICE O/P EST MOD 30 MIN: CPT | Mod: PBBFAC | Performed by: INTERNAL MEDICINE

## 2022-04-20 PROCEDURE — 36415 COLL VENOUS BLD VENIPUNCTURE: CPT | Performed by: NURSE PRACTITIONER

## 2022-04-20 PROCEDURE — 99999 PR PBB SHADOW E&M-EST. PATIENT-LVL IV: ICD-10-PCS | Mod: PBBFAC,,, | Performed by: INTERNAL MEDICINE

## 2022-04-20 PROCEDURE — 99999 PR PBB SHADOW E&M-EST. PATIENT-LVL IV: CPT | Mod: PBBFAC,,, | Performed by: INTERNAL MEDICINE

## 2022-04-20 PROCEDURE — 99215 PR OFFICE/OUTPT VISIT, EST, LEVL V, 40-54 MIN: ICD-10-PCS | Mod: S$GLB,,, | Performed by: INTERNAL MEDICINE

## 2022-04-20 PROCEDURE — 99215 OFFICE O/P EST HI 40 MIN: CPT | Mod: S$GLB,,, | Performed by: INTERNAL MEDICINE

## 2022-04-20 NOTE — PROGRESS NOTES
Hematology and Medical Oncology   Follow Up     04/20/2022        History of Present Ilness:   Tasia Cardona (Tasia) is a pleasant 21 y.o.male who presents today to discuss elevated [but improving] hemoglobin. Earlier this month hemoglobin was 22.2 and has decreased in the last week to 18.8.     Presents today at [facility] clinic for clarification of diagnosis code attached to his previous lab.Verbalized he researched many sites and nervous about what he read on the Internet. Patient appeared nervous during visit. Explained him about his recent visit and lab work. No acute events since last visit. Denies increased pruritis after a hot shower. No  blurry vision. Headache present for few days, taking OTC meds. Will repeat lab work after today's visit.  Does have significant injected conjuctiva, no longer on optho drops, has been improving.      Interval History:  Mr. Cardona is doing well. Tolerated therapeutic phlebotomies without issue. Could tell when he needed phlebotomy based on headaches or itching in a hot shower.     Happy to hear that his hematocrit is below goal. Willing to continue to phlebotomies on a prn basis.       PAST MEDICAL HISTORY:   Past Medical History:   Diagnosis Date    Asthma        PAST SURGICAL HISTORY:   No past surgical history on file.    PAST SOCIAL HISTORY:   reports that he has been smoking cigarettes. He has never used smokeless tobacco. He reports current alcohol use. He reports that he does not use drugs.    FAMILY HISTORY:  Family History   Problem Relation Age of Onset    Pancreatitis Mother     Cancer Mother     Ovarian cancer Mother     No Known Problems Father     Cancer Brother     Breast cancer Maternal Grandmother        CURRENT MEDICATIONS:   Current Outpatient Medications   Medication Sig    BIKTARVY -25 mg per tablet Take 1 tablet by mouth once daily.    diltiazem HCl (DILTIAZEM 2% CREAM) Apply topically 3 (three) times daily. Apply topically to anal area.  (Patient not taking: Reported on 2022)    ibuprofen (ADVIL,MOTRIN) 400 MG tablet Take 400 mg by mouth every 4 (four) hours.    LIDOcaine (LIDODERM) 5 % Place 1 patch onto the skin once daily. Remove & Discard patch within 12 hours or as directed by MD (Patient not taking: Reported on 2022)    LIDOcaine HCL 3 % Crea Apply topically.    naproxen (NAPROSYN) 500 MG tablet Take 1 tablet (500 mg total) by mouth 2 (two) times daily as needed (pain).    nystatin (MYCOSTATIN) 100,000 unit/mL suspension SMARTSI By Mouth 4 Times Daily    ondansetron (ZOFRAN-ODT) 4 MG TbDL Take 1 tablet (4 mg total) by mouth every 6 (six) hours as needed (Nausea). (Patient not taking: Reported on 2022)    pseudoephedrine (SUDAFED) 30 MG tablet Take 30 mg by mouth every 4 (four) hours as needed for Congestion.    valACYclovir (VALTREX) 500 MG tablet Take 2 tablets (1,000 mg total) by mouth 2 (two) times daily. for 7 days (Patient not taking: Reported on 2022)     No current facility-administered medications for this visit.     ALLERGIES:   Review of patient's allergies indicates:   Allergen Reactions    [place] Swelling    Pcn [penicillins] Hives    Pecan nut Swelling    Soy     Sulfa (sulfonamide antibiotics) Hives         Review of Systems:     Review of Systems   Constitutional: Negative for appetite change, chills, diaphoresis, fatigue, fever and unexpected weight change.   HENT:   Negative for hearing loss, mouth sores, nosebleeds, trouble swallowing and voice change.         Headache when due for phlebotomy   Eyes: Negative for eye problems and icterus.   Respiratory: Negative for chest tightness, cough, hemoptysis, shortness of breath and wheezing.    Cardiovascular: Negative for chest pain, leg swelling and palpitations.   Gastrointestinal: Negative for abdominal distention, abdominal pain, blood in stool, diarrhea, nausea and vomiting.   Endocrine: Negative for hot flashes.   Genitourinary: Negative  for bladder incontinence, difficulty urinating, dysuria and hematuria.    Musculoskeletal: Negative for arthralgias, back pain, flank pain, gait problem, myalgias, neck pain and neck stiffness.   Skin: Negative for itching, rash and wound.   Neurological: Negative for dizziness, extremity weakness, gait problem, headaches, numbness, seizures and speech difficulty.   Hematological: Negative for adenopathy. Does not bruise/bleed easily.   Psychiatric/Behavioral: Negative for confusion, depression and sleep disturbance. The patient is not nervous/anxious.           Physical Exam:     Vitals:    04/20/22 1430   BP: (!) 92/59   Pulse: 97   Resp: 12   Temp: 98.2 °F (36.8 °C)     Physical Exam  Constitutional:       General: He is not in acute distress.     Appearance: He is well-developed. He is not diaphoretic.   HENT:      Head: Normocephalic and atraumatic.      Mouth/Throat:      Pharynx: No oropharyngeal exudate.   Eyes:      Pupils: Pupils are equal, round, and reactive to light.   Neck:      Thyroid: No thyromegaly.      Vascular: No JVD.      Trachea: No tracheal deviation.   Cardiovascular:      Rate and Rhythm: Normal rate and regular rhythm.      Heart sounds: Normal heart sounds. No murmur heard.    No friction rub.   Pulmonary:      Effort: Pulmonary effort is normal. No respiratory distress.      Breath sounds: Normal breath sounds. No stridor. No wheezing or rales.   Chest:      Chest wall: No tenderness.   Abdominal:      General: Bowel sounds are normal. There is no distension.      Palpations: Abdomen is soft.      Tenderness: There is no abdominal tenderness. There is no guarding or rebound.   Musculoskeletal:         General: No tenderness or deformity. Normal range of motion.      Cervical back: Normal range of motion and neck supple.   Skin:     General: Skin is warm and dry.      Capillary Refill: Capillary refill takes less than 2 seconds.      Coloration: Skin is not pale.      Findings: No  erythema or rash.   Neurological:      Mental Status: He is alert and oriented to person, place, and time.      Cranial Nerves: No cranial nerve deficit.      Sensory: No sensory deficit.      Motor: No abnormal muscle tone.      Coordination: Coordination normal.      Deep Tendon Reflexes: Reflexes normal.   Psychiatric:         Mood and Affect: Mood is anxious.         Behavior: Behavior normal.         Thought Content: Thought content normal.         Judgment: Judgment normal.         ECOG Performance Status: (foot note - ECOG PS provided by Eastern Cooperative Oncology Group) 0 - Asymptomatic    Karnofsky Performance Score:  100%- Normal, No Complaints, No Evidence of Disease    Labs:   Lab Results   Component Value Date    WBC 10.01 04/20/2022    HGB 14.0 04/20/2022    HCT 44.0 04/20/2022     04/20/2022    ALT 11 12/16/2021    AST 16 12/16/2021     12/16/2021    K 4.6 12/16/2021     12/16/2021    CREATININE 0.9 12/16/2021    BUN 9 12/16/2021    CO2 24 12/16/2021     MPN panel: negative for JAK2 mutation, PENG-R and MPL    Imaging: Previous imaging has been reviewed     Assessment and Plan:   Kelly Cardona is a 21 year old male with an elevated hemoglobin     Polycythemia  --Improving steadily over the last month  --Denies testosterone supplementation  --MPN panel is negative  --Plan for monthly phlebotomy to maintain hct at goal  --If hematocrit consistently stays in the 50's would recommend therapeutic phlebotomy. Repeated lab from today with hct 61.6. will schedule therapeutic phlebotomy with fluid support. Goal to keep hct <45. Patient to start low dose ASA  --Education and reassurance given, will close monitor lab    Follow Up  Need phlebotomy if hct>45  Monthly labs x 3  See me in 3 months    Roxy Daniels MD            BMT Chart Routing      Follow up with physician 3 months. See me in 3 months with labs: cbc,cmp   Follow up with JOSE    Labs CBC and CMP   Lab interval:     Imaging None       Pharmacy appointment No pharmacy appointment needed      Other referrals No additional referrals needed

## 2022-05-09 ENCOUNTER — PATIENT MESSAGE (OUTPATIENT)
Dept: HEMATOLOGY/ONCOLOGY | Facility: CLINIC | Age: 22
End: 2022-05-09
Payer: COMMERCIAL

## 2022-05-09 ENCOUNTER — PATIENT MESSAGE (OUTPATIENT)
Dept: INFECTIOUS DISEASES | Facility: CLINIC | Age: 22
End: 2022-05-09
Payer: COMMERCIAL

## 2022-05-10 ENCOUNTER — HOSPITAL ENCOUNTER (OUTPATIENT)
Dept: TRANSFUSION MEDICINE | Facility: HOSPITAL | Age: 22
Discharge: HOME | End: 2022-05-10
Attending: INTERNAL MEDICINE
Payer: MEDICAID

## 2022-05-10 DIAGNOSIS — D75.1 POLYCYTHEMIA: ICD-10-CM

## 2022-05-10 PROCEDURE — 99195 PHLEBOTOMY: CPT

## 2022-05-20 NOTE — PROGRESS NOTES
Pt presented to donor room ambulatory for a therapeutic phlebotomy.  Vital signs; b/p 137/74, pulse 104, temp 97.2, hematocrit 49% and weight 133 lbs.  490 mls of whole blood was drawn from left ac vein.  Pressure dressing applied after hemostasis was achieved.  Instructions for phlebotomy after care given verbally and written.  Tolerated well.  Refreshments provided.  Pt exited dept ambulatory by himself.

## 2022-05-22 ENCOUNTER — PATIENT MESSAGE (OUTPATIENT)
Dept: INFECTIOUS DISEASES | Facility: CLINIC | Age: 22
End: 2022-05-22
Payer: COMMERCIAL

## 2022-05-27 ENCOUNTER — PATIENT MESSAGE (OUTPATIENT)
Dept: HEMATOLOGY/ONCOLOGY | Facility: CLINIC | Age: 22
End: 2022-05-27
Payer: COMMERCIAL

## 2022-05-27 ENCOUNTER — HOSPITAL ENCOUNTER (OUTPATIENT)
Dept: TRANSFUSION MEDICINE | Facility: HOSPITAL | Age: 22
Discharge: HOME | End: 2022-05-27
Attending: OTOLARYNGOLOGY
Payer: MEDICAID

## 2022-05-27 DIAGNOSIS — D75.1 POLYCYTHEMIA: ICD-10-CM

## 2022-05-27 PROCEDURE — 99195 PHLEBOTOMY: CPT

## 2022-05-30 NOTE — PROGRESS NOTES
Pt to Blood Bank for therapeutic phlebotomy.  /74  P 104  T 97.7  Hct 41.  1 unit WB removed from  L arm.  Tolerated well.  Written/verbal instructions given.  Pressure wrap applied when hemostasis achieved.  Refreshments accepted.  Ambulatory to and from BB per self.

## 2022-06-01 ENCOUNTER — HOSPITAL ENCOUNTER (OUTPATIENT)
Dept: TRANSFUSION MEDICINE | Facility: HOSPITAL | Age: 22
Discharge: HOME | End: 2022-06-01
Payer: MEDICAID

## 2022-06-01 DIAGNOSIS — D75.1 POLYCYTHEMIA: ICD-10-CM

## 2022-06-01 DIAGNOSIS — B20 HIV INFECTION, UNSPECIFIED SYMPTOM STATUS: ICD-10-CM

## 2022-06-16 ENCOUNTER — OFFICE VISIT (OUTPATIENT)
Dept: INFECTIOUS DISEASES | Facility: CLINIC | Age: 22
End: 2022-06-16
Payer: COMMERCIAL

## 2022-06-16 ENCOUNTER — CLINICAL SUPPORT (OUTPATIENT)
Dept: INFECTIOUS DISEASES | Facility: CLINIC | Age: 22
End: 2022-06-16
Payer: COMMERCIAL

## 2022-06-16 VITALS
HEART RATE: 99 BPM | DIASTOLIC BLOOD PRESSURE: 61 MMHG | TEMPERATURE: 98 F | SYSTOLIC BLOOD PRESSURE: 103 MMHG | BODY MASS INDEX: 19.82 KG/M2 | WEIGHT: 138.44 LBS | HEIGHT: 70 IN

## 2022-06-16 DIAGNOSIS — B20 HIV INFECTION, UNSPECIFIED SYMPTOM STATUS: Primary | ICD-10-CM

## 2022-06-16 DIAGNOSIS — B20 HIV INFECTION, UNSPECIFIED SYMPTOM STATUS: ICD-10-CM

## 2022-06-16 PROCEDURE — 90677 PNEUMOCOCCAL CONJUGATE VACCINE 20-VALENT: ICD-10-PCS | Mod: S$GLB,,, | Performed by: INTERNAL MEDICINE

## 2022-06-16 PROCEDURE — 90471 IMMUNIZATION ADMIN: CPT | Mod: S$GLB,,, | Performed by: INTERNAL MEDICINE

## 2022-06-16 PROCEDURE — 90472 IMMUNIZATION ADMIN EACH ADD: CPT | Mod: S$GLB,,, | Performed by: INTERNAL MEDICINE

## 2022-06-16 PROCEDURE — 99999 PR PBB SHADOW E&M-EST. PATIENT-LVL III: ICD-10-PCS | Mod: PBBFAC,,, | Performed by: STUDENT IN AN ORGANIZED HEALTH CARE EDUCATION/TRAINING PROGRAM

## 2022-06-16 PROCEDURE — 90471 PNEUMOCOCCAL CONJUGATE VACCINE 20-VALENT: ICD-10-PCS | Mod: S$GLB,,, | Performed by: INTERNAL MEDICINE

## 2022-06-16 PROCEDURE — 90739 HEPB VACC 2/4 DOSE ADULT IM: CPT | Mod: S$GLB,,, | Performed by: INTERNAL MEDICINE

## 2022-06-16 PROCEDURE — 90472 MENINGOCOCCAL CONJUGATE VACCINE 4-VALENT IM (MENVEO): ICD-10-PCS | Mod: S$GLB,,, | Performed by: INTERNAL MEDICINE

## 2022-06-16 PROCEDURE — 99213 OFFICE O/P EST LOW 20 MIN: CPT | Mod: PBBFAC | Performed by: STUDENT IN AN ORGANIZED HEALTH CARE EDUCATION/TRAINING PROGRAM

## 2022-06-16 PROCEDURE — 99213 OFFICE O/P EST LOW 20 MIN: CPT | Mod: S$GLB,,, | Performed by: STUDENT IN AN ORGANIZED HEALTH CARE EDUCATION/TRAINING PROGRAM

## 2022-06-16 PROCEDURE — 99213 PR OFFICE/OUTPT VISIT, EST, LEVL III, 20-29 MIN: ICD-10-PCS | Mod: S$GLB,,, | Performed by: STUDENT IN AN ORGANIZED HEALTH CARE EDUCATION/TRAINING PROGRAM

## 2022-06-16 PROCEDURE — 87491 CHLMYD TRACH DNA AMP PROBE: CPT | Performed by: STUDENT IN AN ORGANIZED HEALTH CARE EDUCATION/TRAINING PROGRAM

## 2022-06-16 PROCEDURE — 90734 MENINGOCOCCAL CONJUGATE VACCINE 4-VALENT IM (MENVEO): ICD-10-PCS | Mod: S$GLB,,, | Performed by: INTERNAL MEDICINE

## 2022-06-16 PROCEDURE — 90677 PCV20 VACCINE IM: CPT | Mod: S$GLB,,, | Performed by: INTERNAL MEDICINE

## 2022-06-16 PROCEDURE — 90734 MENACWYD/MENACWYCRM VACC IM: CPT | Mod: S$GLB,,, | Performed by: INTERNAL MEDICINE

## 2022-06-16 PROCEDURE — 90739 HEPATITIS B (RECOMBINANT) ADJUVANTED, 2 DOSE: ICD-10-PCS | Mod: S$GLB,,, | Performed by: INTERNAL MEDICINE

## 2022-06-16 PROCEDURE — 87591 N.GONORRHOEAE DNA AMP PROB: CPT | Performed by: STUDENT IN AN ORGANIZED HEALTH CARE EDUCATION/TRAINING PROGRAM

## 2022-06-16 PROCEDURE — 99999 PR PBB SHADOW E&M-EST. PATIENT-LVL III: CPT | Mod: PBBFAC,,, | Performed by: STUDENT IN AN ORGANIZED HEALTH CARE EDUCATION/TRAINING PROGRAM

## 2022-06-16 RX ORDER — BICTEGRAVIR SODIUM, EMTRICITABINE, AND TENOFOVIR ALAFENAMIDE FUMARATE 50; 200; 25 MG/1; MG/1; MG/1
1 TABLET ORAL DAILY
Qty: 30 TABLET | Refills: 5 | Status: SHIPPED | OUTPATIENT
Start: 2022-06-16 | End: 2022-12-13

## 2022-06-16 NOTE — PROGRESS NOTES
Subjective:       Patient ID: Tasia Cardona is a 22 y.o. male.    Chief Complaint: Follow-up and HIV Positive/AIDS (/)    Follow-up  Pertinent negatives include no abdominal pain, change in bowel habit, chest pain, chills, coughing, fatigue, fever, headaches, nausea, numbness or vomiting.   HIV Positive/AIDS       Tasia Cardona is a 20 y/o male without significant PMhx presents to clinic for follow up.      Today, he reports feeling well, He is 100% adherent to his ART, tolerating it well.  He has not been sexually for the past 6 months - he no longer has rectal pain/bleeding or discharge.    Follows with Heme/onc for polycythemia and getting regular phlebotomy.    Last labs in 12/2021  HCV VL undetectable.  RPR/FTA - Neg  HIV VL - 57       Previous visits;     11/2021 Patient went to OSH for symptomatic gonorrhea and he had STD testing including HIV done - which was positive for both and negative for chlamydia. He was referred to our clinic for management. He received treatment for gonorrhea with IM injection.He also report developing redness and itching of both eyes started 2 weeks ago and was given ciprofloxacin drops which did not help and worsened his redness. Denies any sick contact or contact with children or swimming pool.    12/2021 he was started on biktarvy which he report 100% adherent - he also given fluconazole for oral thrush, also was treated empirically for chlamydia with 7 days of doxycycline - he was also referred to ophthalmology ? Possible adenovirus conjunctivitis -- His baseline labs showed HB ~22 he was referred to heme/onc for polycythemia -s/p phlebotomy 12/06 - he then developed abdominal pain+ anal pain with dark stool and occasional fresh blood-- also c/o transit numbness in his left lower extremities was seen in ED CT A/P was negative - Per ED notes "Labs grossly normal with expected erythrocytosis in the setting of PCV. CT AP with contrast unremarkable. UA unremarkable. Likely  isolated upper GI bleed due to recent NSAID use. No evolution of RLQ pain during stay. Instructed to avoid NSAID use and maintain water intake.        HIV history:      · Diagnosis - screening 10/2021     · Previous STD - gonorrhea       · Pervious history of OI - none  · GenotypeR (M41L, T215E, L90M).        PMHx:   ?ADHA and childhood asthma     PSHx:   wisdom teeth at age 12        Allergies:  PCN second grade developed rash next day.  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   Eyes: Negative for visual disturbance.   Respiratory: Negative for cough and shortness of breath.    Cardiovascular: Negative for chest pain and leg swelling.   Gastrointestinal: Negative for abdominal pain, blood in stool, change in bowel habit, diarrhea, nausea, rectal pain, vomiting and change in bowel habit.   Genitourinary: Negative for dysuria, flank pain and genital sores.   Musculoskeletal: Negative for back pain.   Neurological: Negative for numbness and headaches.   Hematological: Negative for adenopathy.         Objective:       Vitals:    22 0847   BP: 103/61   Pulse: 99   Temp: 98.4 °F (36.9 °C)     Physical Exam  Constitutional:       Appearance: He is well-developed.   HENT:      Head: Normocephalic.   Cardiovascular:      Rate and Rhythm: Normal rate and regular rhythm.      Heart sounds: Normal heart sounds. No murmur heard.  Pulmonary:      Effort: Pulmonary effort is normal.       Breath sounds: Normal breath sounds.   Abdominal:      General: Bowel sounds are normal. There is no distension.      Palpations: Abdomen is soft.      Tenderness: There is no abdominal tenderness.   Musculoskeletal:         General: Normal range of motion.   Neurological:      Mental Status: He is alert and oriented to person, place, and time.   Psychiatric:         Behavior: Behavior normal.         Assessment:       Problem List Items Addressed This Visit        ID    HIV infection - Primary    Relevant Medications    BIKTARVY -25 mg (25 kg or greater)    Other Relevant Orders    Comprehensive Metabolic Panel (Completed)    CBC Auto Differential (Completed)    HIV RNA, Quantitative, PCR    RPR (Completed)    FTA antibodies, IgG and IgM    Hepatitis B (Recombinant) Adjuvanted, 2 dose    (In Office Administered) Meningococcal Conjugate - MCV4O (MENVEO) (Completed)    C. trachomatis/N. gonorrhoeae by AMP DNA [facility]; Urine (Completed)    CD4 T-[place] Cells (Completed)          Plan:         HIV:  -- Continue biktarvy.  -- Labs this visit.    Polycythemia:  -- Continue follow up with heme/onc and phlebotm as polycythemia.    HCV:  -- HCV Ab positive 11/2021  -- HCV VL yearly.    Vaccinations:  -- tdap - 8/2019  -- HBV- (ordered).  -- HBAV- immune  -- Prevnar 20 (ordered)  -- Meningococcal (ordered).  -- Needs to complete COVID doses.      RTC in 3 months.      Quincy Donato MD  Infectious Disease Fellow  PGY-5    Pager [phone]

## 2022-06-16 NOTE — PROGRESS NOTES
Patient received Prevnar 20 and Menveo  vaccines in the left deltoid, and Hep B #1 vaccine in the right deltoid,  Pt tolerated well. Pt asked to wait in the clinic 15 minutes after injection in the event of an allergic reaction. Pt verbalized understanding. Pt left in NAD.

## 2022-06-17 ENCOUNTER — LAB VISIT (OUTPATIENT)
Dept: LAB | Facility: HOSPITAL | Age: 22
End: 2022-06-17
Payer: MEDICAID

## 2022-06-17 DIAGNOSIS — B20 HIV INFECTION, UNSPECIFIED SYMPTOM STATUS: ICD-10-CM

## 2022-06-17 LAB
ALBUMIN SERPL BCP-MCNC: 3.9 G/DL (ref 3.5–5.2)
ALP SERPL-CCNC: 103 U/L (ref 55–135)
ALT SERPL W/O P-5'-P-CCNC: 15 U/L (ref 10–44)
ANION GAP SERPL CALC-SCNC: 9 MMOL/L (ref 8–16)
AST SERPL-CCNC: 22 U/L (ref 10–40)
BASOPHILS # BLD AUTO: 0.04 K/UL (ref 0–0.2)
BASOPHILS NFR BLD: 0.6 % (ref 0–1.9)
BILIRUB SERPL-MCNC: 0.6 MG/DL (ref 0.1–1)
BUN SERPL-MCNC: 4 MG/DL (ref 6–20)
CALCIUM SERPL-MCNC: 9.3 MG/DL (ref 8.7–10.5)
CHLORIDE SERPL-SCNC: 105 MMOL/L (ref 95–110)
CO2 SERPL-SCNC: 27 MMOL/L (ref 23–29)
CREAT SERPL-MCNC: 0.8 MG/DL (ref 0.5–1.4)
DIFFERENTIAL METHOD: ABNORMAL
EOSINOPHIL # BLD AUTO: 0.2 K/UL (ref 0–0.5)
EOSINOPHIL NFR BLD: 3.1 % (ref 0–8)
ERYTHROCYTE [DISTWIDTH] IN BLOOD BY AUTOMATED COUNT: 16.4 % (ref 11.5–14.5)
EST. GFR  (AFRICAN AMERICAN): >60 ML/MIN/1.73 M^2
EST. GFR  (NON AFRICAN AMERICAN): >60 ML/MIN/1.73 M^2
GLUCOSE SERPL-MCNC: 73 MG/DL (ref 70–110)
HCT VFR BLD AUTO: 41.8 % (ref 40–54)
HGB BLD-MCNC: 12.9 G/DL (ref 14–18)
IMM GRANULOCYTES # BLD AUTO: 0.01 K/UL (ref 0–0.04)
IMM GRANULOCYTES NFR BLD AUTO: 0.1 % (ref 0–0.5)
LYMPHOCYTES # BLD AUTO: 3.6 K/UL (ref 1–4.8)
LYMPHOCYTES NFR BLD: 51.9 % (ref 18–48)
MCH RBC QN AUTO: 27.1 PG (ref 27–31)
MCHC RBC AUTO-ENTMCNC: 30.9 G/DL (ref 32–36)
MCV RBC AUTO: 88 FL (ref 82–98)
MONOCYTES # BLD AUTO: 0.8 K/UL (ref 0.3–1)
MONOCYTES NFR BLD: 10.7 % (ref 4–15)
NEUTROPHILS # BLD AUTO: 2.4 K/UL (ref 1.8–7.7)
NEUTROPHILS NFR BLD: 33.6 % (ref 38–73)
NRBC BLD-RTO: 0 /100 WBC
PLATELET # BLD AUTO: 320 K/UL (ref 150–450)
PMV BLD AUTO: 9.9 FL (ref 9.2–12.9)
POTASSIUM SERPL-SCNC: 3.9 MMOL/L (ref 3.5–5.1)
PROT SERPL-MCNC: 7.4 G/DL (ref 6–8.4)
RBC # BLD AUTO: 4.76 M/UL (ref 4.6–6.2)
SODIUM SERPL-SCNC: 141 MMOL/L (ref 136–145)
WBC # BLD AUTO: 7.02 K/UL (ref 3.9–12.7)

## 2022-06-17 PROCEDURE — 80053 COMPREHEN METABOLIC PANEL: CPT | Performed by: STUDENT IN AN ORGANIZED HEALTH CARE EDUCATION/TRAINING PROGRAM

## 2022-06-17 PROCEDURE — 36415 COLL VENOUS BLD VENIPUNCTURE: CPT | Performed by: STUDENT IN AN ORGANIZED HEALTH CARE EDUCATION/TRAINING PROGRAM

## 2022-06-17 PROCEDURE — 86361 T CELL ABSOLUTE COUNT: CPT | Performed by: STUDENT IN AN ORGANIZED HEALTH CARE EDUCATION/TRAINING PROGRAM

## 2022-06-17 PROCEDURE — 87536 HIV-1 QUANT&REVRSE TRNSCRPJ: CPT | Performed by: STUDENT IN AN ORGANIZED HEALTH CARE EDUCATION/TRAINING PROGRAM

## 2022-06-17 PROCEDURE — 85025 COMPLETE CBC W/AUTO DIFF WBC: CPT | Performed by: STUDENT IN AN ORGANIZED HEALTH CARE EDUCATION/TRAINING PROGRAM

## 2022-06-17 PROCEDURE — 86592 SYPHILIS TEST NON-TREP QUAL: CPT | Performed by: STUDENT IN AN ORGANIZED HEALTH CARE EDUCATION/TRAINING PROGRAM

## 2022-06-17 PROCEDURE — 86780 TREPONEMA PALLIDUM: CPT | Performed by: STUDENT IN AN ORGANIZED HEALTH CARE EDUCATION/TRAINING PROGRAM

## 2022-06-20 LAB
C TRACH DNA SPEC QL NAA+PROBE: NOT DETECTED
CD3+CD4+ CELLS # BLD: 929 CELLS/UL (ref 300–1400)
CD3+CD4+ CELLS NFR BLD: 26.8 % (ref 28–57)
HIV1 RNA # PLAS NAA DL=20: NORMAL COPIES/ML
N GONORRHOEA DNA SPEC QL NAA+PROBE: NOT DETECTED
RPR SER QL: NORMAL
T PALLIDUM AB SER QL IF: NORMAL

## 2022-06-20 NOTE — PROGRESS NOTES
I have reviewed the notes, assessments, and/or procedures performed by Dr. Donato, I concur with her/his documentation of Tasia Cardona.

## 2022-07-06 ENCOUNTER — HOSPITAL ENCOUNTER (OUTPATIENT)
Dept: TRANSFUSION MEDICINE | Facility: HOSPITAL | Age: 22
Discharge: HOME | End: 2022-07-06
Payer: MEDICAID

## 2022-07-06 ENCOUNTER — PATIENT MESSAGE (OUTPATIENT)
Dept: HEMATOLOGY/ONCOLOGY | Facility: CLINIC | Age: 22
End: 2022-07-06
Payer: MEDICAID

## 2022-07-06 DIAGNOSIS — D75.1 POLYCYTHEMIA: ICD-10-CM

## 2022-07-06 DIAGNOSIS — D45 POLYCYTHEMIA VERA: Primary | ICD-10-CM

## 2022-07-06 PROCEDURE — 99195 PHLEBOTOMY: CPT

## 2022-07-07 NOTE — PROGRESS NOTES
Pt to Blood Bank for therapeutic phlebotomy.  /66  P 86  T 97.0  Hct 42.  1 unit WB removed from  L arm.  Tolerated well.  Written/verbal instructions given.  Pressure wrap applied when hemostasis achieved.  Refreshments accepted.  Ambulatory to and from BB per self.

## 2022-07-27 ENCOUNTER — OFFICE VISIT (OUTPATIENT)
Dept: HEMATOLOGY/ONCOLOGY | Facility: CLINIC | Age: 22
End: 2022-07-27
Payer: MEDICAID

## 2022-07-27 ENCOUNTER — LAB VISIT (OUTPATIENT)
Dept: LAB | Facility: HOSPITAL | Age: 22
End: 2022-07-27
Payer: MEDICAID

## 2022-07-27 VITALS
HEART RATE: 77 BPM | SYSTOLIC BLOOD PRESSURE: 107 MMHG | TEMPERATURE: 99 F | DIASTOLIC BLOOD PRESSURE: 69 MMHG | OXYGEN SATURATION: 100 % | WEIGHT: 140.31 LBS | BODY MASS INDEX: 20.09 KG/M2 | RESPIRATION RATE: 16 BRPM | HEIGHT: 70 IN

## 2022-07-27 DIAGNOSIS — H10.33 ACUTE CONJUNCTIVITIS OF BOTH EYES, UNSPECIFIED ACUTE CONJUNCTIVITIS TYPE: ICD-10-CM

## 2022-07-27 DIAGNOSIS — D75.1 POLYCYTHEMIA: Primary | ICD-10-CM

## 2022-07-27 DIAGNOSIS — D45 POLYCYTHEMIA VERA: ICD-10-CM

## 2022-07-27 LAB
ALBUMIN SERPL BCP-MCNC: 3.5 G/DL (ref 3.5–5.2)
ALP SERPL-CCNC: 139 U/L (ref 55–135)
ALT SERPL W/O P-5'-P-CCNC: 13 U/L (ref 10–44)
ANION GAP SERPL CALC-SCNC: 6 MMOL/L (ref 8–16)
AST SERPL-CCNC: 21 U/L (ref 10–40)
BASOPHILS # BLD AUTO: 0.07 K/UL (ref 0–0.2)
BASOPHILS NFR BLD: 1 % (ref 0–1.9)
BILIRUB SERPL-MCNC: 0.3 MG/DL (ref 0.1–1)
BUN SERPL-MCNC: 7 MG/DL (ref 6–20)
CALCIUM SERPL-MCNC: 8.5 MG/DL (ref 8.7–10.5)
CHLORIDE SERPL-SCNC: 103 MMOL/L (ref 95–110)
CO2 SERPL-SCNC: 26 MMOL/L (ref 23–29)
CREAT SERPL-MCNC: 0.7 MG/DL (ref 0.5–1.4)
DIFFERENTIAL METHOD: ABNORMAL
EOSINOPHIL # BLD AUTO: 0.3 K/UL (ref 0–0.5)
EOSINOPHIL NFR BLD: 4.9 % (ref 0–8)
ERYTHROCYTE [DISTWIDTH] IN BLOOD BY AUTOMATED COUNT: 18 % (ref 11.5–14.5)
EST. GFR  (AFRICAN AMERICAN): >60 ML/MIN/1.73 M^2
EST. GFR  (NON AFRICAN AMERICAN): >60 ML/MIN/1.73 M^2
GLUCOSE SERPL-MCNC: 83 MG/DL (ref 70–110)
HCT VFR BLD AUTO: 38.7 % (ref 40–54)
HGB BLD-MCNC: 11.7 G/DL (ref 14–18)
IMM GRANULOCYTES # BLD AUTO: 0.02 K/UL (ref 0–0.04)
IMM GRANULOCYTES NFR BLD AUTO: 0.3 % (ref 0–0.5)
LYMPHOCYTES # BLD AUTO: 2.4 K/UL (ref 1–4.8)
LYMPHOCYTES NFR BLD: 35 % (ref 18–48)
MCH RBC QN AUTO: 26.2 PG (ref 27–31)
MCHC RBC AUTO-ENTMCNC: 30.2 G/DL (ref 32–36)
MCV RBC AUTO: 87 FL (ref 82–98)
MONOCYTES # BLD AUTO: 0.9 K/UL (ref 0.3–1)
MONOCYTES NFR BLD: 12.9 % (ref 4–15)
NEUTROPHILS # BLD AUTO: 3.1 K/UL (ref 1.8–7.7)
NEUTROPHILS NFR BLD: 45.9 % (ref 38–73)
NRBC BLD-RTO: 0 /100 WBC
PLATELET # BLD AUTO: 264 K/UL (ref 150–450)
PMV BLD AUTO: 9.9 FL (ref 9.2–12.9)
POTASSIUM SERPL-SCNC: 3.8 MMOL/L (ref 3.5–5.1)
PROT SERPL-MCNC: 7 G/DL (ref 6–8.4)
RBC # BLD AUTO: 4.47 M/UL (ref 4.6–6.2)
SODIUM SERPL-SCNC: 135 MMOL/L (ref 136–145)
WBC # BLD AUTO: 6.77 K/UL (ref 3.9–12.7)

## 2022-07-27 PROCEDURE — 1159F PR MEDICATION LIST DOCUMENTED IN MEDICAL RECORD: ICD-10-PCS | Mod: CPTII,,, | Performed by: INTERNAL MEDICINE

## 2022-07-27 PROCEDURE — 3074F SYST BP LT 130 MM HG: CPT | Mod: CPTII,,, | Performed by: INTERNAL MEDICINE

## 2022-07-27 PROCEDURE — 3078F DIAST BP <80 MM HG: CPT | Mod: CPTII,,, | Performed by: INTERNAL MEDICINE

## 2022-07-27 PROCEDURE — 99213 OFFICE O/P EST LOW 20 MIN: CPT | Mod: PBBFAC | Performed by: INTERNAL MEDICINE

## 2022-07-27 PROCEDURE — 99215 PR OFFICE/OUTPT VISIT, EST, LEVL V, 40-54 MIN: ICD-10-PCS | Mod: S$PBB,,, | Performed by: INTERNAL MEDICINE

## 2022-07-27 PROCEDURE — 1159F MED LIST DOCD IN RCRD: CPT | Mod: CPTII,,, | Performed by: INTERNAL MEDICINE

## 2022-07-27 PROCEDURE — 3078F PR MOST RECENT DIASTOLIC BLOOD PRESSURE < 80 MM HG: ICD-10-PCS | Mod: CPTII,,, | Performed by: INTERNAL MEDICINE

## 2022-07-27 PROCEDURE — 85025 COMPLETE CBC W/AUTO DIFF WBC: CPT | Performed by: INTERNAL MEDICINE

## 2022-07-27 PROCEDURE — 3008F PR BODY MASS INDEX (BMI) DOCUMENTED: ICD-10-PCS | Mod: CPTII,,, | Performed by: INTERNAL MEDICINE

## 2022-07-27 PROCEDURE — 99999 PR PBB SHADOW E&M-EST. PATIENT-LVL III: ICD-10-PCS | Mod: PBBFAC,,, | Performed by: INTERNAL MEDICINE

## 2022-07-27 PROCEDURE — 80053 COMPREHEN METABOLIC PANEL: CPT | Performed by: INTERNAL MEDICINE

## 2022-07-27 PROCEDURE — 99999 PR PBB SHADOW E&M-EST. PATIENT-LVL III: CPT | Mod: PBBFAC,,, | Performed by: INTERNAL MEDICINE

## 2022-07-27 PROCEDURE — 99215 OFFICE O/P EST HI 40 MIN: CPT | Mod: S$PBB,,, | Performed by: INTERNAL MEDICINE

## 2022-07-27 PROCEDURE — 36415 COLL VENOUS BLD VENIPUNCTURE: CPT | Performed by: INTERNAL MEDICINE

## 2022-07-27 PROCEDURE — 3074F PR MOST RECENT SYSTOLIC BLOOD PRESSURE < 130 MM HG: ICD-10-PCS | Mod: CPTII,,, | Performed by: INTERNAL MEDICINE

## 2022-07-27 PROCEDURE — 3008F BODY MASS INDEX DOCD: CPT | Mod: CPTII,,, | Performed by: INTERNAL MEDICINE

## 2022-07-27 NOTE — PROGRESS NOTES
Hematology and Medical Oncology   Follow Up     07/27/2022        History of Present Ilness:   Tasia Cardona (Tasia) is a pleasant 22 y.o.male who presents today to discuss elevated [but improving] hemoglobin. Earlier this month hemoglobin was 22.2 and has decreased in the last week to 18.8.     Presents today at [facility] clinic for clarification of diagnosis code attached to his previous lab.Verbalized he researched many sites and nervous about what he read on the Internet. Patient appeared nervous during visit. Explained him about his recent visit and lab work. No acute events since last visit. Denies increased pruritis after a hot shower. No  blurry vision. Headache present for few days, taking OTC meds. Will repeat lab work after today's visit.  Does have significant injected conjuctiva, no longer on optho drops, has been improving.      Interval History:  Mr. Cardona is doing well. Tolerated therapeutic phlebotomies without issue. Could tell when he needed phlebotomy based on headaches or itching in a hot shower.     Happy to hear that his hematocrit is below goal. Willing to continue to phlebotomies on a prn basis.       PAST MEDICAL HISTORY:   Past Medical History:   Diagnosis Date    Asthma        PAST SURGICAL HISTORY:   No past surgical history on file.    PAST SOCIAL HISTORY:   reports that he has been smoking cigarettes. He has never used smokeless tobacco. He reports current alcohol use. He reports that he does not use drugs.    FAMILY HISTORY:  Family History   Problem Relation Age of Onset    Pancreatitis Mother     Cancer Mother     Ovarian cancer Mother     No Known Problems Father     Cancer Brother     Breast cancer Maternal Grandmother        CURRENT MEDICATIONS:   Current Outpatient Medications   Medication Sig    BIKTARVY -25 mg (25 kg or greater) Take 1 tablet by mouth once daily.    diltiazem HCl (DILTIAZEM 2% CREAM) Apply topically 3 (three) times daily. Apply topically to anal  area.    ibuprofen (ADVIL,MOTRIN) 400 MG tablet Take 400 mg by mouth every 4 (four) hours.    LIDOcaine (LIDODERM) 5 % Place 1 patch onto the skin once daily. Remove & Discard patch within 12 hours or as directed by MD    LIDOcaine HCL 3 % Crea Apply topically.    naproxen (NAPROSYN) 500 MG tablet Take 1 tablet (500 mg total) by mouth 2 (two) times daily as needed (pain).    nystatin (MYCOSTATIN) 100,000 unit/mL suspension SMARTSI By Mouth 4 Times Daily    ondansetron (ZOFRAN-ODT) 4 MG TbDL Take 1 tablet (4 mg total) by mouth every 6 (six) hours as needed (Nausea).    pseudoephedrine (SUDAFED) 30 MG tablet Take 30 mg by mouth every 4 (four) hours as needed for Congestion.     No current facility-administered medications for this visit.     ALLERGIES:   Review of patient's allergies indicates:   Allergen Reactions    [place] Swelling    Pcn [penicillins] Hives    Pecan nut Swelling    Soy     Sulfa (sulfonamide antibiotics) Hives         Review of Systems:     Review of Systems   Constitutional: Negative for appetite change, chills, diaphoresis, fatigue, fever and unexpected weight change.   HENT:   Negative for hearing loss, mouth sores, nosebleeds, trouble swallowing and voice change.         Headache when due for phlebotomy   Eyes: Negative for eye problems and icterus.   Respiratory: Negative for chest tightness, cough, hemoptysis, shortness of breath and wheezing.    Cardiovascular: Negative for chest pain, leg swelling and palpitations.   Gastrointestinal: Negative for abdominal distention, abdominal pain, blood in stool, diarrhea, nausea and vomiting.   Endocrine: Negative for hot flashes.   Genitourinary: Negative for bladder incontinence, difficulty urinating, dysuria and hematuria.    Musculoskeletal: Negative for arthralgias, back pain, flank pain, gait problem, myalgias, neck pain and neck stiffness.   Skin: Negative for itching, rash and wound.   Neurological: Negative for dizziness,  extremity weakness, gait problem, headaches, numbness, seizures and speech difficulty.   Hematological: Negative for adenopathy. Does not bruise/bleed easily.   Psychiatric/Behavioral: Negative for confusion, depression and sleep disturbance. The patient is not nervous/anxious.           Physical Exam:     Vitals:    07/27/22 1301   BP: 107/69   Pulse: 77   Resp: 16   Temp: 99.2 °F (37.3 °C)     Physical Exam  Constitutional:       General: He is not in acute distress.     Appearance: He is well-developed. He is not diaphoretic.   HENT:      Head: Normocephalic and atraumatic.      Mouth/Throat:      Pharynx: No oropharyngeal exudate.   Eyes:      Pupils: Pupils are equal, round, and reactive to light.   Neck:      Thyroid: No thyromegaly.      Vascular: No JVD.      Trachea: No tracheal deviation.   Cardiovascular:      Rate and Rhythm: Normal rate and regular rhythm.      Heart sounds: Normal heart sounds. No murmur heard.    No friction rub.   Pulmonary:      Effort: Pulmonary effort is normal. No respiratory distress.      Breath sounds: Normal breath sounds. No stridor. No wheezing or rales.   Chest:      Chest wall: No tenderness.   Abdominal:      General: Bowel sounds are normal. There is no distension.      Palpations: Abdomen is soft.      Tenderness: There is no abdominal tenderness. There is no guarding or rebound.   Musculoskeletal:         General: No tenderness or deformity. Normal range of motion.      Cervical back: Normal range of motion and neck supple.   Skin:     General: Skin is warm and dry.      Capillary Refill: Capillary refill takes less than 2 seconds.      Coloration: Skin is not pale.      Findings: No erythema or rash.   Neurological:      Mental Status: He is alert and oriented to person, place, and time.      Cranial Nerves: No cranial nerve deficit.      Sensory: No sensory deficit.      Motor: No abnormal muscle tone.      Coordination: Coordination normal.      Deep Tendon  Reflexes: Reflexes normal.   Psychiatric:         Mood and Affect: Mood is anxious.         Behavior: Behavior normal.         Thought Content: Thought content normal.         Judgment: Judgment normal.         ECOG Performance Status: (foot note - ECOG PS provided by Eastern Cooperative Oncology Group) 0 - Asymptomatic    Karnofsky Performance Score:  100%- Normal, No Complaints, No Evidence of Disease    Labs:   Lab Results   Component Value Date    WBC 6.77 07/27/2022    HGB 11.7 (L) 07/27/2022    HCT 38.7 (L) 07/27/2022     07/27/2022    ALT 15 06/17/2022    AST 22 06/17/2022     06/17/2022    K 3.9 06/17/2022     06/17/2022    CREATININE 0.8 06/17/2022    BUN 4 (L) 06/17/2022    CO2 27 06/17/2022     MPN panel: negative for JAK2 mutation, PENG-R and MPL    Imaging: Previous imaging has been reviewed     Assessment and Plan:   Kelly Cardona is a 21 year old male with an elevated hemoglobin     Polycythemia  --Improving steadily over the last month  --Denies testosterone supplementation  --MPN panel is negative  --Plan for monthly phlebotomy to maintain hct at goal  --Goal to keep hct <45. Patient to start low dose ASA  --Education and reassurance given, will close monitor lab    Follow Up  Need phlebotomy if hct>45  Monthly labs x 3  See me in 3 months    Roxy Daniels MD            BMT Chart Routing      Follow up with physician 3 months. See me in 3 months with labs: cbc,cmp   Follow up with JOSE    Infusion scheduling note    Injection scheduling note    Labs CBC and CMP   Lab interval:     Imaging None      Pharmacy appointment No pharmacy appointment needed      Other referrals No additional referrals needed

## 2022-08-01 ENCOUNTER — PATIENT MESSAGE (OUTPATIENT)
Dept: HEMATOLOGY/ONCOLOGY | Facility: CLINIC | Age: 22
End: 2022-08-01
Payer: MEDICAID

## 2022-08-02 ENCOUNTER — TELEPHONE (OUTPATIENT)
Dept: HEMATOLOGY/ONCOLOGY | Facility: CLINIC | Age: 22
End: 2022-08-02
Payer: MEDICAID

## 2022-08-02 ENCOUNTER — HOSPITAL ENCOUNTER (EMERGENCY)
Facility: HOSPITAL | Age: 22
Discharge: HOME | End: 2022-08-02
Attending: EMERGENCY MEDICINE
Payer: MEDICAID

## 2022-08-02 VITALS
HEART RATE: 92 BPM | DIASTOLIC BLOOD PRESSURE: 65 MMHG | OXYGEN SATURATION: 98 % | TEMPERATURE: 99 F | RESPIRATION RATE: 18 BRPM | WEIGHT: 130 LBS | HEIGHT: 70 IN | SYSTOLIC BLOOD PRESSURE: 119 MMHG | BODY MASS INDEX: 18.61 KG/M2

## 2022-08-02 DIAGNOSIS — R52 PAINFUL COUGH: ICD-10-CM

## 2022-08-02 DIAGNOSIS — R06.2 WHEEZING: ICD-10-CM

## 2022-08-02 DIAGNOSIS — R07.9 CHEST PAIN: Primary | ICD-10-CM

## 2022-08-02 DIAGNOSIS — B20 HISTORY OF HIV INFECTION: ICD-10-CM

## 2022-08-02 DIAGNOSIS — R05.8 PAINFUL COUGH: ICD-10-CM

## 2022-08-02 DIAGNOSIS — Z72.0 TOBACCO USE: ICD-10-CM

## 2022-08-02 DIAGNOSIS — R06.02 SOB (SHORTNESS OF BREATH): ICD-10-CM

## 2022-08-02 LAB
ALBUMIN SERPL BCP-MCNC: 3.6 G/DL (ref 3.5–5.2)
ALP SERPL-CCNC: 164 U/L (ref 55–135)
ALT SERPL W/O P-5'-P-CCNC: 17 U/L (ref 10–44)
ANION GAP SERPL CALC-SCNC: 9 MMOL/L (ref 8–16)
AST SERPL-CCNC: 26 U/L (ref 10–40)
BASOPHILS # BLD AUTO: 0.05 K/UL (ref 0–0.2)
BASOPHILS NFR BLD: 0.5 % (ref 0–1.9)
BILIRUB SERPL-MCNC: 0.6 MG/DL (ref 0.1–1)
BNP SERPL-MCNC: <10 PG/ML (ref 0–99)
BUN SERPL-MCNC: 7 MG/DL (ref 6–20)
CALCIUM SERPL-MCNC: 9.5 MG/DL (ref 8.7–10.5)
CHLORIDE SERPL-SCNC: 102 MMOL/L (ref 95–110)
CO2 SERPL-SCNC: 25 MMOL/L (ref 23–29)
CREAT SERPL-MCNC: 0.7 MG/DL (ref 0.5–1.4)
D DIMER PPP IA.FEU-MCNC: 3.33 MG/L FEU
DIFFERENTIAL METHOD: ABNORMAL
EOSINOPHIL # BLD AUTO: 0.3 K/UL (ref 0–0.5)
EOSINOPHIL NFR BLD: 3.6 % (ref 0–8)
ERYTHROCYTE [DISTWIDTH] IN BLOOD BY AUTOMATED COUNT: 18.6 % (ref 11.5–14.5)
EST. GFR  (NO RACE VARIABLE): >60 ML/MIN/1.73 M^2
GLUCOSE SERPL-MCNC: 83 MG/DL (ref 70–110)
HCT VFR BLD AUTO: 39.5 % (ref 40–54)
HGB BLD-MCNC: 11.9 G/DL (ref 14–18)
IMM GRANULOCYTES # BLD AUTO: 0.03 K/UL (ref 0–0.04)
IMM GRANULOCYTES NFR BLD AUTO: 0.3 % (ref 0–0.5)
LYMPHOCYTES # BLD AUTO: 2.2 K/UL (ref 1–4.8)
LYMPHOCYTES NFR BLD: 23.8 % (ref 18–48)
MCH RBC QN AUTO: 25.9 PG (ref 27–31)
MCHC RBC AUTO-ENTMCNC: 30.1 G/DL (ref 32–36)
MCV RBC AUTO: 86 FL (ref 82–98)
MONOCYTES # BLD AUTO: 1.1 K/UL (ref 0.3–1)
MONOCYTES NFR BLD: 11.2 % (ref 4–15)
NEUTROPHILS # BLD AUTO: 5.7 K/UL (ref 1.8–7.7)
NEUTROPHILS NFR BLD: 60.6 % (ref 38–73)
NRBC BLD-RTO: 0 /100 WBC
PLATELET # BLD AUTO: 243 K/UL (ref 150–450)
PMV BLD AUTO: 9.7 FL (ref 9.2–12.9)
POTASSIUM SERPL-SCNC: 4.3 MMOL/L (ref 3.5–5.1)
PROT SERPL-MCNC: 7.8 G/DL (ref 6–8.4)
RBC # BLD AUTO: 4.59 M/UL (ref 4.6–6.2)
SARS-COV-2 RDRP RESP QL NAA+PROBE: NEGATIVE
SODIUM SERPL-SCNC: 136 MMOL/L (ref 136–145)
TROPONIN I SERPL DL<=0.01 NG/ML-MCNC: <0.006 NG/ML (ref 0–0.03)
WBC # BLD AUTO: 9.41 K/UL (ref 3.9–12.7)

## 2022-08-02 PROCEDURE — 93005 ELECTROCARDIOGRAM TRACING: CPT

## 2022-08-02 PROCEDURE — 99285 EMERGENCY DEPT VISIT HI MDM: CPT | Mod: CS,,, | Performed by: PHYSICIAN ASSISTANT

## 2022-08-02 PROCEDURE — 25000003 PHARM REV CODE 250: Performed by: PHYSICIAN ASSISTANT

## 2022-08-02 PROCEDURE — 99285 PR EMERGENCY DEPT VISIT,LEVEL V: ICD-10-PCS | Mod: CS,,, | Performed by: PHYSICIAN ASSISTANT

## 2022-08-02 PROCEDURE — 93010 EKG 12-LEAD: ICD-10-PCS | Mod: ,,, | Performed by: INTERNAL MEDICINE

## 2022-08-02 PROCEDURE — 85379 FIBRIN DEGRADATION QUANT: CPT | Performed by: PHYSICIAN ASSISTANT

## 2022-08-02 PROCEDURE — 80053 COMPREHEN METABOLIC PANEL: CPT | Performed by: PHYSICIAN ASSISTANT

## 2022-08-02 PROCEDURE — 93010 ELECTROCARDIOGRAM REPORT: CPT | Mod: ,,, | Performed by: INTERNAL MEDICINE

## 2022-08-02 PROCEDURE — 84484 ASSAY OF TROPONIN QUANT: CPT | Performed by: PHYSICIAN ASSISTANT

## 2022-08-02 PROCEDURE — 99285 EMERGENCY DEPT VISIT HI MDM: CPT | Mod: 25

## 2022-08-02 PROCEDURE — 85025 COMPLETE CBC W/AUTO DIFF WBC: CPT | Performed by: PHYSICIAN ASSISTANT

## 2022-08-02 PROCEDURE — 25500020 PHARM REV CODE 255: Performed by: EMERGENCY MEDICINE

## 2022-08-02 PROCEDURE — 83880 ASSAY OF NATRIURETIC PEPTIDE: CPT | Performed by: PHYSICIAN ASSISTANT

## 2022-08-02 PROCEDURE — U0002 COVID-19 LAB TEST NON-CDC: HCPCS | Performed by: PHYSICIAN ASSISTANT

## 2022-08-02 RX ORDER — DOXYCYCLINE 100 MG/1
100 CAPSULE ORAL EVERY 12 HOURS
Qty: 20 CAPSULE | Refills: 0 | Status: SHIPPED | OUTPATIENT
Start: 2022-08-02 | End: 2022-08-12

## 2022-08-02 RX ORDER — ALBUTEROL SULFATE 90 UG/1
1-2 AEROSOL, METERED RESPIRATORY (INHALATION) EVERY 6 HOURS PRN
Qty: 1 G | Refills: 0 | Status: SHIPPED | OUTPATIENT
Start: 2022-08-02 | End: 2023-09-18

## 2022-08-02 RX ORDER — KETOROLAC TROMETHAMINE 10 MG/1
10 TABLET, FILM COATED ORAL
Status: COMPLETED | OUTPATIENT
Start: 2022-08-02 | End: 2022-08-02

## 2022-08-02 RX ADMIN — IOHEXOL 100 ML: 350 INJECTION, SOLUTION INTRAVENOUS at 01:08

## 2022-08-02 RX ADMIN — KETOROLAC TROMETHAMINE 10 MG: 10 TABLET, FILM COATED ORAL at 02:08

## 2022-08-02 NOTE — TELEPHONE ENCOUNTER
Attempted to call patient to discuss symptoms and advise to proceed to ER. No Answer at this time.     85 yo female PMH of dementia, Afib (Xarelto, Last taken Tuesday) PPM (Indicated for tachy-yudith syndrome), cerebral LICA paraclinoid aneurysm (s/p stenting). Admitted for abdominal pain and 5 days of poor to none oral intake. CT scan done during this admission shows large bowel obstruction, narrowing of the rectum 9-12cm from the anal verge now s/p rectal stent. Pt having bowel function and being prepped for scope tomorrow.    CLD, PICC/TPN, NPO@[facility]  Home meds as appropriate  Ceftriaxone(1/21-1/26)  Pain/nausea PRN  Amio PO for AFib w/ RVR  ISS  AM labs  Appreciate palliative recs  GI Scope 1/27
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
## 2022-08-02 NOTE — ED NOTES
Chest tightness for a week; radiates down into back and under right arm.  States it is difficult for him to get into a comfortable position.  Reports some SOB and nausea. Denies cardiac hx but has hx of polycythemia vera. States he has tried OTC meds with minimal or no relief

## 2022-09-09 ENCOUNTER — PATIENT MESSAGE (OUTPATIENT)
Dept: HEMATOLOGY/ONCOLOGY | Facility: CLINIC | Age: 22
End: 2022-09-09
Payer: MEDICAID

## 2022-09-09 DIAGNOSIS — D75.1 POLYCYTHEMIA: Primary | ICD-10-CM

## 2022-09-12 ENCOUNTER — LAB VISIT (OUTPATIENT)
Dept: LAB | Facility: HOSPITAL | Age: 22
End: 2022-09-12
Attending: INTERNAL MEDICINE
Payer: MEDICAID

## 2022-09-12 DIAGNOSIS — D45 POLYCYTHEMIA VERA: ICD-10-CM

## 2022-09-12 LAB
BASOPHILS # BLD AUTO: 0.05 K/UL (ref 0–0.2)
BASOPHILS NFR BLD: 0.6 % (ref 0–1.9)
DIFFERENTIAL METHOD: ABNORMAL
EOSINOPHIL # BLD AUTO: 0.3 K/UL (ref 0–0.5)
EOSINOPHIL NFR BLD: 3.3 % (ref 0–8)
ERYTHROCYTE [DISTWIDTH] IN BLOOD BY AUTOMATED COUNT: 19.7 % (ref 11.5–14.5)
HCT VFR BLD AUTO: 41.2 % (ref 40–54)
HGB BLD-MCNC: 13 G/DL (ref 14–18)
IMM GRANULOCYTES # BLD AUTO: 0.01 K/UL (ref 0–0.04)
IMM GRANULOCYTES NFR BLD AUTO: 0.1 % (ref 0–0.5)
LYMPHOCYTES # BLD AUTO: 2.8 K/UL (ref 1–4.8)
LYMPHOCYTES NFR BLD: 32.4 % (ref 18–48)
MCH RBC QN AUTO: 26.9 PG (ref 27–31)
MCHC RBC AUTO-ENTMCNC: 31.6 G/DL (ref 32–36)
MCV RBC AUTO: 85 FL (ref 82–98)
MONOCYTES # BLD AUTO: 0.9 K/UL (ref 0.3–1)
MONOCYTES NFR BLD: 10.5 % (ref 4–15)
NEUTROPHILS # BLD AUTO: 4.6 K/UL (ref 1.8–7.7)
NEUTROPHILS NFR BLD: 53.1 % (ref 38–73)
NRBC BLD-RTO: 0 /100 WBC
PLATELET # BLD AUTO: 223 K/UL (ref 150–450)
PMV BLD AUTO: 9.6 FL (ref 9.2–12.9)
RBC # BLD AUTO: 4.84 M/UL (ref 4.6–6.2)
WBC # BLD AUTO: 8.6 K/UL (ref 3.9–12.7)

## 2022-09-12 PROCEDURE — 36415 COLL VENOUS BLD VENIPUNCTURE: CPT | Performed by: INTERNAL MEDICINE

## 2022-09-12 PROCEDURE — 85025 COMPLETE CBC W/AUTO DIFF WBC: CPT | Performed by: INTERNAL MEDICINE

## 2022-09-13 ENCOUNTER — HOSPITAL ENCOUNTER (OUTPATIENT)
Dept: TRANSFUSION MEDICINE | Facility: HOSPITAL | Age: 22
Discharge: HOME | End: 2022-09-13
Attending: INTERNAL MEDICINE
Payer: MEDICAID

## 2022-09-13 DIAGNOSIS — D75.1 POLYCYTHEMIA: ICD-10-CM

## 2022-09-13 PROCEDURE — 99195 PHLEBOTOMY: CPT

## 2022-09-20 ENCOUNTER — CLINICAL SUPPORT (OUTPATIENT)
Dept: INFECTIOUS DISEASES | Facility: CLINIC | Age: 22
End: 2022-09-20
Payer: MEDICAID

## 2022-09-20 ENCOUNTER — OFFICE VISIT (OUTPATIENT)
Dept: INFECTIOUS DISEASES | Facility: CLINIC | Age: 22
End: 2022-09-20
Payer: MEDICAID

## 2022-09-20 VITALS
WEIGHT: 139.13 LBS | HEART RATE: 100 BPM | SYSTOLIC BLOOD PRESSURE: 122 MMHG | DIASTOLIC BLOOD PRESSURE: 78 MMHG | TEMPERATURE: 99 F | BODY MASS INDEX: 19.92 KG/M2 | HEIGHT: 70 IN

## 2022-09-20 DIAGNOSIS — B20 HIV INFECTION, UNSPECIFIED SYMPTOM STATUS: Primary | ICD-10-CM

## 2022-09-20 DIAGNOSIS — B20 HIV INFECTION, UNSPECIFIED SYMPTOM STATUS: ICD-10-CM

## 2022-09-20 DIAGNOSIS — R91.8 ABNORMAL CT SCAN, LUNG: ICD-10-CM

## 2022-09-20 PROCEDURE — 99999 PR PBB SHADOW E&M-EST. PATIENT-LVL IV: CPT | Mod: PBBFAC,,, | Performed by: STUDENT IN AN ORGANIZED HEALTH CARE EDUCATION/TRAINING PROGRAM

## 2022-09-20 PROCEDURE — 3008F PR BODY MASS INDEX (BMI) DOCUMENTED: ICD-10-PCS | Mod: CPTII,,, | Performed by: STUDENT IN AN ORGANIZED HEALTH CARE EDUCATION/TRAINING PROGRAM

## 2022-09-20 PROCEDURE — 3008F BODY MASS INDEX DOCD: CPT | Mod: CPTII,,, | Performed by: STUDENT IN AN ORGANIZED HEALTH CARE EDUCATION/TRAINING PROGRAM

## 2022-09-20 PROCEDURE — 99214 OFFICE O/P EST MOD 30 MIN: CPT | Mod: S$PBB,,, | Performed by: STUDENT IN AN ORGANIZED HEALTH CARE EDUCATION/TRAINING PROGRAM

## 2022-09-20 PROCEDURE — 99214 OFFICE O/P EST MOD 30 MIN: CPT | Mod: PBBFAC,25 | Performed by: STUDENT IN AN ORGANIZED HEALTH CARE EDUCATION/TRAINING PROGRAM

## 2022-09-20 PROCEDURE — 99211 OFF/OP EST MAY X REQ PHY/QHP: CPT | Mod: PBBFAC

## 2022-09-20 PROCEDURE — 3078F PR MOST RECENT DIASTOLIC BLOOD PRESSURE < 80 MM HG: ICD-10-PCS | Mod: CPTII,,, | Performed by: STUDENT IN AN ORGANIZED HEALTH CARE EDUCATION/TRAINING PROGRAM

## 2022-09-20 PROCEDURE — 99214 PR OFFICE/OUTPT VISIT, EST, LEVL IV, 30-39 MIN: ICD-10-PCS | Mod: S$PBB,,, | Performed by: STUDENT IN AN ORGANIZED HEALTH CARE EDUCATION/TRAINING PROGRAM

## 2022-09-20 PROCEDURE — 99999 PR PBB SHADOW E&M-EST. PATIENT-LVL I: ICD-10-PCS | Mod: PBBFAC,,,

## 2022-09-20 PROCEDURE — 1159F PR MEDICATION LIST DOCUMENTED IN MEDICAL RECORD: ICD-10-PCS | Mod: CPTII,,, | Performed by: STUDENT IN AN ORGANIZED HEALTH CARE EDUCATION/TRAINING PROGRAM

## 2022-09-20 PROCEDURE — 99999 PR PBB SHADOW E&M-EST. PATIENT-LVL I: CPT | Mod: PBBFAC,,,

## 2022-09-20 PROCEDURE — 3078F DIAST BP <80 MM HG: CPT | Mod: CPTII,,, | Performed by: STUDENT IN AN ORGANIZED HEALTH CARE EDUCATION/TRAINING PROGRAM

## 2022-09-20 PROCEDURE — 99999 PR PBB SHADOW E&M-EST. PATIENT-LVL IV: ICD-10-PCS | Mod: PBBFAC,,, | Performed by: STUDENT IN AN ORGANIZED HEALTH CARE EDUCATION/TRAINING PROGRAM

## 2022-09-20 PROCEDURE — 1159F MED LIST DOCD IN RCRD: CPT | Mod: CPTII,,, | Performed by: STUDENT IN AN ORGANIZED HEALTH CARE EDUCATION/TRAINING PROGRAM

## 2022-09-20 PROCEDURE — 3074F PR MOST RECENT SYSTOLIC BLOOD PRESSURE < 130 MM HG: ICD-10-PCS | Mod: CPTII,,, | Performed by: STUDENT IN AN ORGANIZED HEALTH CARE EDUCATION/TRAINING PROGRAM

## 2022-09-20 PROCEDURE — 3074F SYST BP LT 130 MM HG: CPT | Mod: CPTII,,, | Performed by: STUDENT IN AN ORGANIZED HEALTH CARE EDUCATION/TRAINING PROGRAM

## 2022-09-20 PROCEDURE — 90471 IMMUNIZATION ADMIN: CPT | Mod: PBBFAC

## 2022-09-20 NOTE — PROGRESS NOTES
INFECTIOUS DISEASE CLINIC  09/21/2022     Subjective:      Chief Complaint: HIV    History of Present Illness:    21-year-old male with asthma, polycythemia vera presents for HIV follow-up.    Diagnosed on screening test 10/2021.  History of previous gonorrhea 2021.    11/4/2021 - Hep C ab+, viral load negative  GenotypeR - M41L, T215E, L90M    Receives phlebotomy about every other month for polycythemia vera.    6/17/22 CD4 929 viral load undetectable    Complaining of Intermittent nonexertional pleuritic chest pain, sometimes when moving arm.  Associated with dyspnea on exertion, noted more than what he'd expect after exercise, swimming in the pool.  Seen in ED 8/2/22, possible inflammation SUSAN on CT, EKG showing sinus rhythm with marked sinus arrhythmia and incomplete RBB.  Prescribed doxycycline, might have helped a little bit.  After ED visit reporting night sweats.  No cough, weight loss.    Born and raised in [place].  Moved here for college.  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  Lives in [place].  Swims in a chlorinated pool as exercise.  No recent travel.  Sexually active with men.  Smokes 2 cigarettes daily.  Owns 2 cats.  No birds.    Review of Systems   Constitutional: Positive for night sweats. Negative for chills, fever, weight gain and weight loss.   HENT:  Negative for odynophagia and sore throat.    Eyes:  Negative for visual disturbance.   Cardiovascular:  Positive for chest pain and dyspnea on exertion. Negative for leg swelling.   Respiratory:  Negative for cough, sputum production and wheezing.    Hematologic/Lymphatic: Negative for adenopathy.   Skin:  Negative for rash.   Musculoskeletal:  Negative for joint pain and joint swelling.   Gastrointestinal:  Negative for abdominal pain, diarrhea, nausea and vomiting.   Genitourinary:  Negative for dysuria, flank pain and genital sores.   Neurological:  Negative for headaches.   Psychiatric/Behavioral:  Negative for altered  mental status.        Past Medical History:   Diagnosis Date    Asthma      No past surgical history on file.  Family History   Problem Relation Age of Onset    Pancreatitis Mother     Cancer Mother     Ovarian cancer Mother     No Known Problems Father     Cancer Brother     Breast cancer Maternal Grandmother      Social History     Tobacco Use    Smoking status: Some Days     Types: Cigarettes    Smokeless tobacco: Never   Substance Use Topics    Alcohol use: Yes    Drug use: Never       Review of patient's allergies indicates:   Allergen Reactions    [place] Swelling    Pcn [penicillins] Hives    Pecan nut Swelling    Soy     Sulfa (sulfonamide antibiotics) Hives         Objective:   VS (24h):   Vitals:    09/20/22 0940   BP: 122/78   Pulse: 100   Temp: 99.3 °F (37.4 °C)         Physical Exam  Vitals reviewed.   Constitutional:       General: He is not in acute distress.     Appearance: He is normal weight. He is not ill-appearing, toxic-appearing or diaphoretic.   HENT:      Head: Normocephalic and atraumatic.      Right Ear: External ear normal.      Left Ear: External ear normal.      Nose: Nose normal.      Mouth/Throat:      Mouth: Mucous membranes are moist.      Pharynx: Oropharynx is clear. No oropharyngeal exudate or posterior oropharyngeal erythema.   Eyes:      General: No scleral icterus.        Right eye: No discharge.         Left eye: No discharge.      Extraocular Movements: Extraocular movements intact.      Conjunctiva/sclera: Conjunctivae normal.   Cardiovascular:      Rate and Rhythm: Normal rate and regular rhythm.      Heart sounds: Normal heart sounds.   Pulmonary:      Effort: Pulmonary effort is normal. No respiratory distress.      Breath sounds: Normal breath sounds. No wheezing, rhonchi or rales.   Abdominal:      General: Abdomen is flat. There is no distension.      Palpations: Abdomen is soft.   Musculoskeletal:         General: No swelling or deformity. Normal range of motion.       Cervical back: Normal range of motion. No rigidity.      Comments: Left chest wall and back with some discomfort on palpation   Lymphadenopathy:      Cervical: No cervical adenopathy.   Skin:     General: Skin is warm and dry.      Findings: No rash.   Neurological:      Mental Status: He is alert and oriented to person, place, and time. Mental status is at baseline.      Cranial Nerves: No cranial nerve deficit.      Motor: No weakness.      Gait: Gait normal.   Psychiatric:         Mood and Affect: Mood normal.         Behavior: Behavior normal.         Thought Content: Thought content normal.         Judgment: Judgment normal.         Labs:  Reviewed    Micro:   Reviewed    Radiology:   Reviewed    Immunization History   Administered Date(s) Administered    COVID-19, MRNA, LN-S, PF (MODERNA FULL 0.5 ML DOSE) 10/08/2021    Hepatitis B (recombinant) Adjuvanted, 2 dose 06/16/2022, 09/20/2022    Meningococcal Conjugate (MCV4O) 06/16/2022    Pneumococcal Conjugate - 20 Valent 06/16/2022    Tdap 08/17/2019         Assessment & Plan:     1. HIV infection, unspecified symptom status    2. Abnormal CT scan, lung  - CT Chest Without Contrast; Future  - Ambulatory referral/consult to Pulmonology; Future     HIV well-controlled  -Continue Biktarvy  -Repeat labs next visit    Pleurisy, dyspnea on exertion, night sweats, abnormal CT  -Unclear etiology, atypical PNA (but not improved after doxycycline), early ILD?; does smoke but not significant amount (2 cigs/day), no occupation exposures or animal exposures outside of cats, no recent travel.  Persisting even after ED visit.  -Repeat CT chest  -pulmonology referral    Follow up in 3 months    Fritz Taylor MD  Infectious Disease Fellow

## 2022-10-15 ENCOUNTER — PATIENT MESSAGE (OUTPATIENT)
Dept: INFECTIOUS DISEASES | Facility: CLINIC | Age: 22
End: 2022-10-15
Payer: MEDICAID

## 2022-10-17 ENCOUNTER — TELEPHONE (OUTPATIENT)
Dept: INFECTIOUS DISEASES | Facility: CLINIC | Age: 22
End: 2022-10-17
Payer: MEDICAID

## 2022-10-17 NOTE — TELEPHONE ENCOUNTER
Called and informed patient that we were able to schedule him an appointment with Dr. Dawson on Tuesday 10/18/2022 @ 1:30. Patient confirmed and understood.       ----- Message from Fritz Taylor MD sent at 10/17/2022  9:50 AM CDT -----  Can someone see him this week to look at his oral thrush?  It's not typical for someone with HIV as well-controlled as him to have it so I wanted someone to see him if possible.    If not let me know, lin

## 2022-10-18 ENCOUNTER — OFFICE VISIT (OUTPATIENT)
Dept: INFECTIOUS DISEASES | Facility: CLINIC | Age: 22
End: 2022-10-18
Payer: MEDICAID

## 2022-10-18 ENCOUNTER — LAB VISIT (OUTPATIENT)
Dept: LAB | Facility: HOSPITAL | Age: 22
End: 2022-10-18
Attending: INTERNAL MEDICINE
Payer: MEDICAID

## 2022-10-18 VITALS
TEMPERATURE: 99 F | HEART RATE: 92 BPM | BODY MASS INDEX: 20.56 KG/M2 | WEIGHT: 143.31 LBS | DIASTOLIC BLOOD PRESSURE: 83 MMHG | SYSTOLIC BLOOD PRESSURE: 132 MMHG

## 2022-10-18 DIAGNOSIS — B20 HIV INFECTION, UNSPECIFIED SYMPTOM STATUS: ICD-10-CM

## 2022-10-18 DIAGNOSIS — B20 HIV INFECTION, UNSPECIFIED SYMPTOM STATUS: Primary | ICD-10-CM

## 2022-10-18 LAB
HBV SURFACE AB SER-ACNC: >1000 MIU/ML
HBV SURFACE AB SER-ACNC: REACTIVE M[IU]/ML

## 2022-10-18 PROCEDURE — 3079F DIAST BP 80-89 MM HG: CPT | Mod: CPTII,,, | Performed by: INTERNAL MEDICINE

## 2022-10-18 PROCEDURE — 36415 COLL VENOUS BLD VENIPUNCTURE: CPT | Performed by: INTERNAL MEDICINE

## 2022-10-18 PROCEDURE — 3079F PR MOST RECENT DIASTOLIC BLOOD PRESSURE 80-89 MM HG: ICD-10-PCS | Mod: CPTII,,, | Performed by: INTERNAL MEDICINE

## 2022-10-18 PROCEDURE — 1159F MED LIST DOCD IN RCRD: CPT | Mod: CPTII,,, | Performed by: INTERNAL MEDICINE

## 2022-10-18 PROCEDURE — 3075F SYST BP GE 130 - 139MM HG: CPT | Mod: CPTII,,, | Performed by: INTERNAL MEDICINE

## 2022-10-18 PROCEDURE — 1159F PR MEDICATION LIST DOCUMENTED IN MEDICAL RECORD: ICD-10-PCS | Mod: CPTII,,, | Performed by: INTERNAL MEDICINE

## 2022-10-18 PROCEDURE — 86361 T CELL ABSOLUTE COUNT: CPT | Performed by: INTERNAL MEDICINE

## 2022-10-18 PROCEDURE — 99999 PR PBB SHADOW E&M-EST. PATIENT-LVL III: CPT | Mod: PBBFAC,,, | Performed by: INTERNAL MEDICINE

## 2022-10-18 PROCEDURE — 86592 SYPHILIS TEST NON-TREP QUAL: CPT | Performed by: INTERNAL MEDICINE

## 2022-10-18 PROCEDURE — 99214 PR OFFICE/OUTPT VISIT, EST, LEVL IV, 30-39 MIN: ICD-10-PCS | Mod: S$PBB,,, | Performed by: INTERNAL MEDICINE

## 2022-10-18 PROCEDURE — 3075F PR MOST RECENT SYSTOLIC BLOOD PRESS GE 130-139MM HG: ICD-10-PCS | Mod: CPTII,,, | Performed by: INTERNAL MEDICINE

## 2022-10-18 PROCEDURE — 99214 OFFICE O/P EST MOD 30 MIN: CPT | Mod: S$PBB,,, | Performed by: INTERNAL MEDICINE

## 2022-10-18 PROCEDURE — 87536 HIV-1 QUANT&REVRSE TRNSCRPJ: CPT | Performed by: INTERNAL MEDICINE

## 2022-10-18 PROCEDURE — 86706 HEP B SURFACE ANTIBODY: CPT | Mod: 91 | Performed by: INTERNAL MEDICINE

## 2022-10-18 PROCEDURE — 87591 N.GONORRHOEAE DNA AMP PROB: CPT | Performed by: INTERNAL MEDICINE

## 2022-10-18 PROCEDURE — 99999 PR PBB SHADOW E&M-EST. PATIENT-LVL III: ICD-10-PCS | Mod: PBBFAC,,, | Performed by: INTERNAL MEDICINE

## 2022-10-18 PROCEDURE — 99213 OFFICE O/P EST LOW 20 MIN: CPT | Mod: PBBFAC | Performed by: INTERNAL MEDICINE

## 2022-10-18 NOTE — PROGRESS NOTES
[facility]ECT[facility] DISEASE [facility]  10/18/2022 2:04 PM    Subjective:      Chief Complaint:   Chief Complaint   Patient presents with    Thrush       History of Present Illness:    Patient Tasia Cardona is a 22 y.o. male with hiv and PCV who presents today for white tongue. Reports several days of white tongue. Denies pain with swallow. Worried he has trush and made appt. Uses albuterol inhaler but denies inhaled corticosteroids. Denies any pain with lesions. Reports 100% compliance with biktarvy. Sexually active      Review of Symptoms:  Constitutional: Denies fevers, chills, or weakness.  ENT: Denies dysphagia, nasal discharge, ear pain or discharge.  Cardiovascular: Denies chest pain, palpitations, orthopnea, or claudication.  Respiratory: Denies shortness of breath, cough, hemoptysis, or wheezing.  GI: Denies nausea/vomitting, hematochezia, melena, abd pain, or changes in appetite.  : Denies dysuria, incontinence, or hematuria.  Musculoskeletal: Denies joint pain or myalgias.  Skin/breast: Denies rashes, lumps, lesions, or discharge.  Neurologic: Denies headache, dizziness, vertigo, or paresthesias.    Past Medical History:   Diagnosis Date    Asthma        No past surgical history on file.    Family History   Problem Relation Age of Onset    Pancreatitis Mother     Cancer Mother     Ovarian cancer Mother     No Known Problems Father     Cancer Brother     Breast cancer Maternal Grandmother        Social History     Socioeconomic History    Marital status: Single   Tobacco Use    Smoking status: Some Days     Types: Cigarettes    Smokeless tobacco: Never   Substance and Sexual Activity    Alcohol use: Yes    Drug use: Never       Review of patient's allergies indicates:   Allergen Reactions    [place] Swelling    Pcn [penicillins] Hives    Pecan nut Swelling    Soy     Sulfa (sulfonamide antibiotics) Hives         Objective:   VS (24h):   Vitals:    10/18/22 1329   BP: 132/83   Pulse: 92   Temp: 98.5 °F (36.9 °C)      [unfilled]  General: Afebrile, alert, comfortable, no acute distress.   HEENT: SHANICE. EOMI, no scleral icterus. Diffuse White tongue. Doesn't scrape off. No other oral lesions or focal areas of worsening.   Pulmonary: Non labored,clear to auscultation A/P/L. No wheezing, crackles, or rhonchi.  Cardiac: normal S1 & S2 w/o rubs/murmurs/gallops.   Abdominal: Non-tender, non-distended.  Extremities: Moves all extremities x 4. No peripheral edema. 2+ pulses.  Skin: No jaundice, rashes, or visible lesions.   Neurological:  Alert and oriented x 4.     Labs:    Glucose   Date Value Ref Range Status   08/02/2022 83 70 - 110 mg/dL Final   07/27/2022 83 70 - 110 mg/dL Final   06/17/2022 73 70 - 110 mg/dL Final       Calcium   Date Value Ref Range Status   08/02/2022 9.5 8.7 - 10.5 mg/dL Final   07/27/2022 8.5 (L) 8.7 - 10.5 mg/dL Final   06/17/2022 9.3 8.7 - 10.5 mg/dL Final       Albumin   Date Value Ref Range Status   08/02/2022 3.6 3.5 - 5.2 g/dL Final   07/27/2022 3.5 3.5 - 5.2 g/dL Final   06/17/2022 3.9 3.5 - 5.2 g/dL Final       Total Protein   Date Value Ref Range Status   08/02/2022 7.8 6.0 - 8.4 g/dL Final   07/27/2022 7.0 6.0 - 8.4 g/dL Final   06/17/2022 7.4 6.0 - 8.4 g/dL Final       Sodium   Date Value Ref Range Status   08/02/2022 136 136 - 145 mmol/L Final   07/27/2022 135 (L) 136 - 145 mmol/L Final   06/17/2022 141 136 - 145 mmol/L Final       Potassium   Date Value Ref Range Status   08/02/2022 4.3 3.5 - 5.1 mmol/L Final   07/27/2022 3.8 3.5 - 5.1 mmol/L Final   06/17/2022 3.9 3.5 - 5.1 mmol/L Final       CO2   Date Value Ref Range Status   08/02/2022 25 23 - 29 mmol/L Final   07/27/2022 26 23 - 29 mmol/L Final   06/17/2022 27 23 - 29 mmol/L Final       Chloride   Date Value Ref Range Status   08/02/2022 102 95 - 110 mmol/L Final   07/27/2022 103 95 - 110 mmol/L Final   06/17/2022 105 95 - 110 mmol/L Final       BUN   Date Value Ref Range Status   08/02/2022 7 6 - 20 mg/dL Final   07/27/2022 7 6 - 20  mg/dL Final   06/17/2022 4 (L) 6 - 20 mg/dL Final       Creatinine   Date Value Ref Range Status   08/02/2022 0.7 0.5 - 1.4 mg/dL Final   07/27/2022 0.7 0.5 - 1.4 mg/dL Final   06/17/2022 0.8 0.5 - 1.4 mg/dL Final       Alkaline Phosphatase   Date Value Ref Range Status   08/02/2022 164 (H) 55 - 135 U/L Final   07/27/2022 139 (H) 55 - 135 U/L Final   06/17/2022 103 55 - 135 U/L Final       ALT   Date Value Ref Range Status   08/02/2022 17 10 - 44 U/L Final   07/27/2022 13 10 - 44 U/L Final   06/17/2022 15 10 - 44 U/L Final       AST   Date Value Ref Range Status   08/02/2022 26 10 - 40 U/L Final   07/27/2022 21 10 - 40 U/L Final   06/17/2022 22 10 - 40 U/L Final       Total Bilirubin   Date Value Ref Range Status   08/02/2022 0.6 0.1 - 1.0 mg/dL Final     Comment:     For infants and newborns, interpretation of results should be based  on gestational age, weight and in agreement with clinical  observations.    Premature Infant recommended reference ranges:  Up to 24 hours.............<8.0 mg/dL  Up to 48 hours............<12.0 mg/dL  3-5 days..................<15.0 mg/dL  6-29 days.................<15.0 mg/dL     07/27/2022 0.3 0.1 - 1.0 mg/dL Final     Comment:     For infants and newborns, interpretation of results should be based  on gestational age, weight and in agreement with clinical  observations.    Premature Infant recommended reference ranges:  Up to 24 hours.............<8.0 mg/dL  Up to 48 hours............<12.0 mg/dL  3-5 days..................<15.0 mg/dL  6-29 days.................<15.0 mg/dL     06/17/2022 0.6 0.1 - 1.0 mg/dL Final     Comment:     For infants and newborns, interpretation of results should be based  on gestational age, weight and in agreement with clinical  observations.    Premature Infant recommended reference ranges:  Up to 24 hours.............<8.0 mg/dL  Up to 48 hours............<12.0 mg/dL  3-5 days..................<15.0 mg/dL  6-29 days.................<15.0 mg/dL          WBC   Date Value Ref Range Status   2022 8.60 3.90 - 12.70 K/uL Final   2022 9.41 3.90 - 12.70 K/uL Final   2022 6.77 3.90 - 12.70 K/uL Final       Hemoglobin   Date Value Ref Range Status   2022 13.0 (L) 14.0 - 18.0 g/dL Final   2022 11.9 (L) 14.0 - 18.0 g/dL Final   2022 11.7 (L) 14.0 - 18.0 g/dL Final       Hematocrit   Date Value Ref Range Status   2022 41.2 40.0 - 54.0 % Final   2022 39.5 (L) 40.0 - 54.0 % Final   2022 38.7 (L) 40.0 - 54.0 % Final       MCV   Date Value Ref Range Status   2022 85 82 - 98 fL Final   2022 86 82 - 98 fL Final   2022 87 82 - 98 fL Final       Platelets   Date Value Ref Range Status   2022 223 150 - 450 K/uL Final   2022 243 150 - 450 K/uL Final   2022 264 150 - 450 K/uL Final     No results found for: CHOL  No results found for: HDL  No results found for: LDLCALC  No results found for: TRIG  No results found for: CHOLHDL    RPR   Date Value Ref Range Status   2022 Non-reactive Non-reactive Final   2021 Non-reactive Non-reactive Final   2021 Non-reactive Non-reactive Final     No results found for: QUANTIFERON    Medications:  Current Outpatient Medications on File Prior to Visit   Medication Sig Dispense Refill    albuterol (PROVENTIL/VENTOLIN HFA) 90 mcg/actuation inhaler Inhale 1-2 puffs into the lungs every 6 (six) hours as needed for Wheezing or Shortness of Breath. Rescue 1 g 0    BIKTARVY -25 mg (25 kg or greater) Take 1 tablet by mouth once daily. 30 tablet 5    ibuprofen (ADVIL,MOTRIN) 400 MG tablet Take 400 mg by mouth every 4 (four) hours.      nystatin (MYCOSTATIN) 100,000 unit/mL suspension SMARTSI By Mouth 4 Times Daily      [DISCONTINUED] diltiazem HCl (DILTIAZEM 2% CREAM) Apply topically 3 (three) times daily. Apply topically to anal area. 30 g 2    [DISCONTINUED] LIDOcaine (LIDODERM) 5 % Place 1 patch onto the skin once daily. Remove & Discard  patch within 12 hours or as directed by MD 15 patch 2    [DISCONTINUED] LIDOcaine HCL 3 % Crea Apply topically.      [DISCONTINUED] naproxen (NAPROSYN) 500 MG tablet Take 1 tablet (500 mg total) by mouth 2 (two) times daily as needed (pain). 30 tablet 0    [DISCONTINUED] ondansetron (ZOFRAN-ODT) 4 MG TbDL Take 1 tablet (4 mg total) by mouth every 6 (six) hours as needed (Nausea). 15 tablet 0    [DISCONTINUED] pseudoephedrine (SUDAFED) 30 MG tablet Take 30 mg by mouth every 4 (four) hours as needed for Congestion.       No current facility-administered medications on file prior to visit.       Antibiotics:   Antibiotics (From admission, onward)      None            HIV: No components found for: HIV 1/2 AG/AB  Hepatitis C IgG: No components found for: HEPATITIS C  Syphilis:   RPR   Date Value Ref Range Status   06/17/2022 Non-reactive Non-reactive Final   12/16/2021 Non-reactive Non-reactive Final   11/04/2021 Non-reactive Non-reactive Final       Hepatitis A IgG: No components found for: HEPATITIS A IGG  Hepatitis Bc IgG: No components found for: HEPATITIS B CORE IGG  Hepatitis Bs IgG:  Quantiferon: No results found for: QUANTIFERON  VZV IgG: No components found for: VARICELLA IGG    No components found for: SEDIMENTATION RATE  No components found for: C-REACTIVE PROTEIN      Microbiology x 7d:   Microbiology Results (last 7 days)       Procedure Component Value Units Date/Time    C. trachomatis/N. gonorrhoeae by AMP DNA (Throat) [[id number]] Collected: 10/18/22 1354    Order Status: Sent Specimen: Throat     C. trachomatis/N. gonorrhoeae by AMP DNA (Rectal) [[id number]]     Order Status: No result Specimen: Rectal from Rectum             Immunization History   Administered Date(s) Administered    COVID-19, MRNA, LN-S, PF (MODERNA FULL 0.5 ML DOSE) 10/08/2021    Hepatitis B (recombinant) Adjuvanted, 2 dose 06/16/2022, 09/20/2022    Meningococcal Conjugate (MCV4O) 06/16/2022    Pneumococcal Conjugate - 20 Valent  06/16/2022    Tdap 08/17/2019         Reviewed records today as well as relevant labs, cultures, and imaging    Assessment:     Hiv  White tongue  Std screening  Vaccine counseling.       No toxicities from antimicrobials  Extremely medically complex    White on tongue that doesn't scrape off, no pain and only present for several days.  No lesions on palate. Cd4 normal and last VL Undetectable.  Not c/w thrush Suspect bacterial overgrowth    Plan:     Recommend oral hygiene, avoiding excess sugar/coffee consumption and monitor for improvement. If doesn't clear up in a few weeks, encouraged ENT eval.    Counseled on need for 100% compliance with biktarvy. (Pt getting from [facility] 2/2 low copay). Labs today- vl, cd4. Std screening- G/C oral (refused rectal), rpr    Hcv ab+, RNA neg- fortunately cleared infection    Recommended flu vaccine today, pt declined    - Will monitor drug therapy for toxicity      - The following labs were ordered:    Orders Placed This Encounter   Procedures    C. trachomatis/N. gonorrhoeae by AMP DNA (Rectal)    C. trachomatis/N. gonorrhoeae by AMP DNA (Throat)    HIV RNA, Quantitative, PCR     Standing Status:   Future     Number of Occurrences:   1     Standing Expiration Date:   10/18/2023    CD4 T-Earp Cells     Standing Status:   Future     Number of Occurrences:   1     Standing Expiration Date:   10/18/2023    RPR     Standing Status:   Future     Number of Occurrences:   1     Standing Expiration Date:   12/17/2023     Order Specific Question:   Release to patient     Answer:   Immediate    Hepatitis B Surface Ab, Qualitative     Standing Status:   Future     Number of Occurrences:   1     Standing Expiration Date:   12/17/2023    Misc Sendout Test, Non-Blood Gonorrhea PCR - oral     [facility] mcRNA, Hologic Aptima swab (unisex endocervical and male uretheral)  Gonorrhea PCR - oral     Standing Status:   Future     Number of Occurrences:   1     Standing Expiration Date:    12/17/2023     Order Specific Question:   Source:     Answer:   Other (Please Specify)     Comments:   oral     Order Specific Question:   Name of Test     Answer:   Gonorrhea PCR - oral       I have sent communication to the referring physician and/or primary care provider.     I spent a total of 32 minutes on the day of the visit. This includes face to face time and non-face to face time preparing to see the patient (eg, review of tests), obtaining and/or reviewing separately obtained history, documenting clinical information in the electronic or other health record, independently interpreting results, and communicating results to the patient/family/caregiver, or care coordination.      Karen Dawson MD, MPH  Infectious Disease

## 2022-10-19 LAB
CD3+CD4+ CELLS # BLD: 1084 CELLS/UL (ref 300–1400)
CD3+CD4+ CELLS NFR BLD: 34.1 % (ref 28–57)
HIV1 RNA # SERPL NAA+PROBE: NOT DETECTED COPIES/ML
HIV1 RNA SERPL QL NAA+PROBE: NOT DETECTED
N GONORRHOEA RRNA SPEC QL NAA+PROBE: NEGATIVE
N.GONORROHEAE, AMP RNA SOURCE: NORMAL
RPR SER QL: NORMAL

## 2022-10-31 ENCOUNTER — OFFICE VISIT (OUTPATIENT)
Dept: HEMATOLOGY/ONCOLOGY | Facility: CLINIC | Age: 22
End: 2022-10-31
Payer: MEDICAID

## 2022-10-31 ENCOUNTER — LAB VISIT (OUTPATIENT)
Dept: LAB | Facility: HOSPITAL | Age: 22
End: 2022-10-31
Payer: MEDICAID

## 2022-10-31 VITALS
RESPIRATION RATE: 16 BRPM | BODY MASS INDEX: 20.37 KG/M2 | SYSTOLIC BLOOD PRESSURE: 122 MMHG | HEIGHT: 70 IN | OXYGEN SATURATION: 97 % | WEIGHT: 142.31 LBS | DIASTOLIC BLOOD PRESSURE: 65 MMHG | HEART RATE: 91 BPM | TEMPERATURE: 98 F

## 2022-10-31 DIAGNOSIS — D45 POLYCYTHEMIA VERA: ICD-10-CM

## 2022-10-31 DIAGNOSIS — D75.1 POLYCYTHEMIA: Primary | ICD-10-CM

## 2022-10-31 DIAGNOSIS — Z21 ASYMPTOMATIC HIV INFECTION, WITH NO HISTORY OF HIV-RELATED ILLNESS: ICD-10-CM

## 2022-10-31 LAB
ALBUMIN SERPL BCP-MCNC: 4 G/DL (ref 3.5–5.2)
ALP SERPL-CCNC: 95 U/L (ref 55–135)
ALT SERPL W/O P-5'-P-CCNC: 13 U/L (ref 10–44)
ANION GAP SERPL CALC-SCNC: 8 MMOL/L (ref 8–16)
AST SERPL-CCNC: 23 U/L (ref 10–40)
BASOPHILS # BLD AUTO: 0.04 K/UL (ref 0–0.2)
BASOPHILS NFR BLD: 0.8 % (ref 0–1.9)
BILIRUB SERPL-MCNC: 0.4 MG/DL (ref 0.1–1)
BUN SERPL-MCNC: 7 MG/DL (ref 6–20)
CALCIUM SERPL-MCNC: 8.8 MG/DL (ref 8.7–10.5)
CHLORIDE SERPL-SCNC: 106 MMOL/L (ref 95–110)
CO2 SERPL-SCNC: 27 MMOL/L (ref 23–29)
CREAT SERPL-MCNC: 0.8 MG/DL (ref 0.5–1.4)
DIFFERENTIAL METHOD: ABNORMAL
EOSINOPHIL # BLD AUTO: 0.2 K/UL (ref 0–0.5)
EOSINOPHIL NFR BLD: 4.4 % (ref 0–8)
ERYTHROCYTE [DISTWIDTH] IN BLOOD BY AUTOMATED COUNT: 22 % (ref 11.5–14.5)
EST. GFR  (NO RACE VARIABLE): >60 ML/MIN/1.73 M^2
GLUCOSE SERPL-MCNC: 78 MG/DL (ref 70–110)
HCT VFR BLD AUTO: 44.3 % (ref 40–54)
HGB BLD-MCNC: 14 G/DL (ref 14–18)
IMM GRANULOCYTES # BLD AUTO: 0.01 K/UL (ref 0–0.04)
IMM GRANULOCYTES NFR BLD AUTO: 0.2 % (ref 0–0.5)
LYMPHOCYTES # BLD AUTO: 2.2 K/UL (ref 1–4.8)
LYMPHOCYTES NFR BLD: 46.3 % (ref 18–48)
MCH RBC QN AUTO: 27.2 PG (ref 27–31)
MCHC RBC AUTO-ENTMCNC: 31.6 G/DL (ref 32–36)
MCV RBC AUTO: 86 FL (ref 82–98)
MONOCYTES # BLD AUTO: 0.8 K/UL (ref 0.3–1)
MONOCYTES NFR BLD: 16.7 % (ref 4–15)
NEUTROPHILS # BLD AUTO: 1.5 K/UL (ref 1.8–7.7)
NEUTROPHILS NFR BLD: 31.6 % (ref 38–73)
NRBC BLD-RTO: 0 /100 WBC
PLATELET # BLD AUTO: 297 K/UL (ref 150–450)
PMV BLD AUTO: 9.2 FL (ref 9.2–12.9)
POTASSIUM SERPL-SCNC: 3.7 MMOL/L (ref 3.5–5.1)
PROT SERPL-MCNC: 7.2 G/DL (ref 6–8.4)
RBC # BLD AUTO: 5.15 M/UL (ref 4.6–6.2)
SODIUM SERPL-SCNC: 141 MMOL/L (ref 136–145)
WBC # BLD AUTO: 4.8 K/UL (ref 3.9–12.7)

## 2022-10-31 PROCEDURE — 99999 PR PBB SHADOW E&M-EST. PATIENT-LVL III: ICD-10-PCS | Mod: PBBFAC,,, | Performed by: INTERNAL MEDICINE

## 2022-10-31 PROCEDURE — 3078F DIAST BP <80 MM HG: CPT | Mod: CPTII,,, | Performed by: INTERNAL MEDICINE

## 2022-10-31 PROCEDURE — 3074F PR MOST RECENT SYSTOLIC BLOOD PRESSURE < 130 MM HG: ICD-10-PCS | Mod: CPTII,,, | Performed by: INTERNAL MEDICINE

## 2022-10-31 PROCEDURE — 1159F PR MEDICATION LIST DOCUMENTED IN MEDICAL RECORD: ICD-10-PCS | Mod: CPTII,,, | Performed by: INTERNAL MEDICINE

## 2022-10-31 PROCEDURE — 99213 OFFICE O/P EST LOW 20 MIN: CPT | Mod: PBBFAC | Performed by: INTERNAL MEDICINE

## 2022-10-31 PROCEDURE — 99215 OFFICE O/P EST HI 40 MIN: CPT | Mod: S$PBB,,, | Performed by: INTERNAL MEDICINE

## 2022-10-31 PROCEDURE — 3078F PR MOST RECENT DIASTOLIC BLOOD PRESSURE < 80 MM HG: ICD-10-PCS | Mod: CPTII,,, | Performed by: INTERNAL MEDICINE

## 2022-10-31 PROCEDURE — 99215 PR OFFICE/OUTPT VISIT, EST, LEVL V, 40-54 MIN: ICD-10-PCS | Mod: S$PBB,,, | Performed by: INTERNAL MEDICINE

## 2022-10-31 PROCEDURE — 1159F MED LIST DOCD IN RCRD: CPT | Mod: CPTII,,, | Performed by: INTERNAL MEDICINE

## 2022-10-31 PROCEDURE — 85025 COMPLETE CBC W/AUTO DIFF WBC: CPT | Performed by: INTERNAL MEDICINE

## 2022-10-31 PROCEDURE — 99999 PR PBB SHADOW E&M-EST. PATIENT-LVL III: CPT | Mod: PBBFAC,,, | Performed by: INTERNAL MEDICINE

## 2022-10-31 PROCEDURE — 80053 COMPREHEN METABOLIC PANEL: CPT | Performed by: INTERNAL MEDICINE

## 2022-10-31 PROCEDURE — 36415 COLL VENOUS BLD VENIPUNCTURE: CPT | Performed by: INTERNAL MEDICINE

## 2022-10-31 PROCEDURE — 3074F SYST BP LT 130 MM HG: CPT | Mod: CPTII,,, | Performed by: INTERNAL MEDICINE

## 2022-12-16 ENCOUNTER — HOSPITAL ENCOUNTER (OUTPATIENT)
Facility: HOSPITAL | Age: 22
Discharge: HOME | End: 2022-12-17
Attending: STUDENT IN AN ORGANIZED HEALTH CARE EDUCATION/TRAINING PROGRAM | Admitting: INTERNAL MEDICINE
Payer: MEDICAID

## 2022-12-16 DIAGNOSIS — R00.1 BRADYCARDIA: ICD-10-CM

## 2022-12-16 DIAGNOSIS — S06.0X1A CONCUSSION WITH LOSS OF CONSCIOUSNESS OF 30 MINUTES OR LESS, INITIAL ENCOUNTER: ICD-10-CM

## 2022-12-16 DIAGNOSIS — R07.9 CHEST PAIN: ICD-10-CM

## 2022-12-16 DIAGNOSIS — R55 SYNCOPE: Primary | ICD-10-CM

## 2022-12-16 DIAGNOSIS — S09.90XA CLOSED HEAD INJURY, INITIAL ENCOUNTER: ICD-10-CM

## 2022-12-16 DIAGNOSIS — R11.2 NAUSEA AND VOMITING, UNSPECIFIED VOMITING TYPE: ICD-10-CM

## 2022-12-16 LAB
ANION GAP SERPL CALC-SCNC: 15 MMOL/L (ref 8–16)
BASOPHILS # BLD AUTO: 0.08 K/UL (ref 0–0.2)
BASOPHILS NFR BLD: 1 % (ref 0–1.9)
BUN SERPL-MCNC: 5 MG/DL (ref 6–20)
CALCIUM SERPL-MCNC: 9 MG/DL (ref 8.7–10.5)
CHLORIDE SERPL-SCNC: 102 MMOL/L (ref 95–110)
CO2 SERPL-SCNC: 26 MMOL/L (ref 23–29)
CREAT SERPL-MCNC: 0.7 MG/DL (ref 0.5–1.4)
DIFFERENTIAL METHOD: ABNORMAL
EOSINOPHIL # BLD AUTO: 0.2 K/UL (ref 0–0.5)
EOSINOPHIL NFR BLD: 2.8 % (ref 0–8)
ERYTHROCYTE [DISTWIDTH] IN BLOOD BY AUTOMATED COUNT: 20.7 % (ref 11.5–14.5)
EST. GFR  (NO RACE VARIABLE): >60 ML/MIN/1.73 M^2
GLUCOSE SERPL-MCNC: 69 MG/DL (ref 70–110)
HCT VFR BLD AUTO: 44.2 % (ref 40–54)
HGB BLD-MCNC: 14.3 G/DL (ref 14–18)
IMM GRANULOCYTES # BLD AUTO: 0.02 K/UL (ref 0–0.04)
IMM GRANULOCYTES NFR BLD AUTO: 0.3 % (ref 0–0.5)
LYMPHOCYTES # BLD AUTO: 2.9 K/UL (ref 1–4.8)
LYMPHOCYTES NFR BLD: 36.5 % (ref 18–48)
MCH RBC QN AUTO: 29.5 PG (ref 27–31)
MCHC RBC AUTO-ENTMCNC: 32.4 G/DL (ref 32–36)
MCV RBC AUTO: 91 FL (ref 82–98)
MONOCYTES # BLD AUTO: 1 K/UL (ref 0.3–1)
MONOCYTES NFR BLD: 12.4 % (ref 4–15)
NEUTROPHILS # BLD AUTO: 3.7 K/UL (ref 1.8–7.7)
NEUTROPHILS NFR BLD: 47 % (ref 38–73)
NRBC BLD-RTO: 0 /100 WBC
PLATELET # BLD AUTO: 270 K/UL (ref 150–450)
PMV BLD AUTO: 8.7 FL (ref 9.2–12.9)
POTASSIUM SERPL-SCNC: 3.8 MMOL/L (ref 3.5–5.1)
RBC # BLD AUTO: 4.85 M/UL (ref 4.6–6.2)
SODIUM SERPL-SCNC: 143 MMOL/L (ref 136–145)
WBC # BLD AUTO: 7.9 K/UL (ref 3.9–12.7)

## 2022-12-16 PROCEDURE — 99285 PR EMERGENCY DEPT VISIT,LEVEL V: ICD-10-PCS | Mod: ,,, | Performed by: PHYSICIAN ASSISTANT

## 2022-12-16 PROCEDURE — 94761 N-INVAS EAR/PLS OXIMETRY MLT: CPT

## 2022-12-16 PROCEDURE — G0378 HOSPITAL OBSERVATION PER HR: HCPCS

## 2022-12-16 PROCEDURE — 96374 THER/PROPH/DIAG INJ IV PUSH: CPT

## 2022-12-16 PROCEDURE — 99285 EMERGENCY DEPT VISIT HI MDM: CPT | Mod: 25

## 2022-12-16 PROCEDURE — 63600175 PHARM REV CODE 636 W HCPCS: Performed by: PHYSICIAN ASSISTANT

## 2022-12-16 PROCEDURE — 25000003 PHARM REV CODE 250: Performed by: PHYSICIAN ASSISTANT

## 2022-12-16 PROCEDURE — 85025 COMPLETE CBC W/AUTO DIFF WBC: CPT | Performed by: PHYSICIAN ASSISTANT

## 2022-12-16 PROCEDURE — 96361 HYDRATE IV INFUSION ADD-ON: CPT

## 2022-12-16 PROCEDURE — 80048 BASIC METABOLIC PNL TOTAL CA: CPT | Performed by: PHYSICIAN ASSISTANT

## 2022-12-16 PROCEDURE — 99285 EMERGENCY DEPT VISIT HI MDM: CPT | Mod: ,,, | Performed by: PHYSICIAN ASSISTANT

## 2022-12-16 PROCEDURE — 25000003 PHARM REV CODE 250: Performed by: FAMILY MEDICINE

## 2022-12-16 RX ORDER — SODIUM CHLORIDE 9 MG/ML
INJECTION, SOLUTION INTRAVENOUS CONTINUOUS
Status: ACTIVE | OUTPATIENT
Start: 2022-12-16 | End: 2022-12-17

## 2022-12-16 RX ORDER — SODIUM CHLORIDE 9 MG/ML
INJECTION, SOLUTION INTRAVENOUS CONTINUOUS
Status: DISCONTINUED | OUTPATIENT
Start: 2022-12-16 | End: 2022-12-16

## 2022-12-16 RX ORDER — IPRATROPIUM BROMIDE AND ALBUTEROL SULFATE 2.5; .5 MG/3ML; MG/3ML
3 SOLUTION RESPIRATORY (INHALATION) EVERY 6 HOURS PRN
Status: DISCONTINUED | OUTPATIENT
Start: 2022-12-16 | End: 2022-12-17 | Stop reason: HOSPADM

## 2022-12-16 RX ORDER — NALOXONE HCL 0.4 MG/ML
0.02 VIAL (ML) INJECTION
Status: DISCONTINUED | OUTPATIENT
Start: 2022-12-16 | End: 2022-12-17 | Stop reason: HOSPADM

## 2022-12-16 RX ORDER — PROCHLORPERAZINE EDISYLATE 5 MG/ML
10 INJECTION INTRAMUSCULAR; INTRAVENOUS
Status: COMPLETED | OUTPATIENT
Start: 2022-12-16 | End: 2022-12-16

## 2022-12-16 RX ORDER — IBUPROFEN 200 MG
16 TABLET ORAL
Status: DISCONTINUED | OUTPATIENT
Start: 2022-12-16 | End: 2022-12-17 | Stop reason: HOSPADM

## 2022-12-16 RX ORDER — SODIUM CHLORIDE 0.9 % (FLUSH) 0.9 %
10 SYRINGE (ML) INJECTION EVERY 12 HOURS PRN
Status: DISCONTINUED | OUTPATIENT
Start: 2022-12-16 | End: 2022-12-17 | Stop reason: HOSPADM

## 2022-12-16 RX ORDER — MAG HYDROX/ALUMINUM HYD/SIMETH 200-200-20
30 SUSPENSION, ORAL (FINAL DOSE FORM) ORAL 4 TIMES DAILY PRN
Status: DISCONTINUED | OUTPATIENT
Start: 2022-12-16 | End: 2022-12-17 | Stop reason: HOSPADM

## 2022-12-16 RX ORDER — GLUCAGON 1 MG
1 KIT INJECTION
Status: DISCONTINUED | OUTPATIENT
Start: 2022-12-16 | End: 2022-12-17 | Stop reason: HOSPADM

## 2022-12-16 RX ORDER — KETOROLAC TROMETHAMINE 30 MG/ML
15 INJECTION, SOLUTION INTRAMUSCULAR; INTRAVENOUS EVERY 6 HOURS PRN
Status: DISCONTINUED | OUTPATIENT
Start: 2022-12-16 | End: 2022-12-17 | Stop reason: HOSPADM

## 2022-12-16 RX ORDER — IBUPROFEN 200 MG
24 TABLET ORAL
Status: DISCONTINUED | OUTPATIENT
Start: 2022-12-16 | End: 2022-12-17 | Stop reason: HOSPADM

## 2022-12-16 RX ORDER — ACETAMINOPHEN 500 MG
1000 TABLET ORAL EVERY 8 HOURS PRN
Status: DISCONTINUED | OUTPATIENT
Start: 2022-12-16 | End: 2022-12-17 | Stop reason: HOSPADM

## 2022-12-16 RX ORDER — ONDANSETRON 8 MG/1
8 TABLET, ORALLY DISINTEGRATING ORAL EVERY 8 HOURS PRN
Status: DISCONTINUED | OUTPATIENT
Start: 2022-12-16 | End: 2022-12-17 | Stop reason: HOSPADM

## 2022-12-16 RX ORDER — BICTEGRAVIR SODIUM, EMTRICITABINE, AND TENOFOVIR ALAFENAMIDE FUMARATE 50; 200; 25 MG/1; MG/1; MG/1
1 TABLET ORAL DAILY
COMMUNITY
End: 2023-01-11 | Stop reason: SDUPTHER

## 2022-12-16 RX ORDER — ACETAMINOPHEN 325 MG/1
650 TABLET ORAL
Status: COMPLETED | OUTPATIENT
Start: 2022-12-16 | End: 2022-12-16

## 2022-12-16 RX ORDER — TALC
6 POWDER (GRAM) TOPICAL NIGHTLY PRN
Status: DISCONTINUED | OUTPATIENT
Start: 2022-12-16 | End: 2022-12-17 | Stop reason: HOSPADM

## 2022-12-16 RX ADMIN — PROCHLORPERAZINE EDISYLATE 10 MG: 5 INJECTION INTRAMUSCULAR; INTRAVENOUS at 08:12

## 2022-12-16 RX ADMIN — SODIUM CHLORIDE: 0.9 INJECTION, SOLUTION INTRAVENOUS at 11:12

## 2022-12-16 RX ADMIN — ACETAMINOPHEN 650 MG: 325 TABLET ORAL at 08:12

## 2022-12-16 RX ADMIN — SODIUM CHLORIDE 1000 ML: 9 INJECTION, SOLUTION INTRAVENOUS at 08:12

## 2022-12-17 VITALS
HEART RATE: 66 BPM | WEIGHT: 140 LBS | TEMPERATURE: 99 F | DIASTOLIC BLOOD PRESSURE: 76 MMHG | HEIGHT: 70 IN | RESPIRATION RATE: 18 BRPM | OXYGEN SATURATION: 99 % | SYSTOLIC BLOOD PRESSURE: 141 MMHG | BODY MASS INDEX: 20.04 KG/M2

## 2022-12-17 PROBLEM — R55 SYNCOPE: Status: ACTIVE | Noted: 2022-12-17

## 2022-12-17 PROBLEM — R11.2 NAUSEA & VOMITING: Status: ACTIVE | Noted: 2022-12-17

## 2022-12-17 LAB
ALBUMIN SERPL BCP-MCNC: 3.3 G/DL (ref 3.5–5.2)
ALP SERPL-CCNC: 131 U/L (ref 55–135)
ALT SERPL W/O P-5'-P-CCNC: 56 U/L (ref 10–44)
ANION GAP SERPL CALC-SCNC: 11 MMOL/L (ref 8–16)
ASCENDING AORTA: 2.9 CM
AST SERPL-CCNC: 66 U/L (ref 10–40)
AV INDEX (PROSTH): 0.72
AV MEAN GRADIENT: 5 MMHG
AV PEAK GRADIENT: 7 MMHG
AV VALVE AREA: 3.82 CM2
AV VELOCITY RATIO: 0.65
BASOPHILS # BLD AUTO: 0.06 K/UL (ref 0–0.2)
BASOPHILS NFR BLD: 0.8 % (ref 0–1.9)
BILIRUB SERPL-MCNC: 0.3 MG/DL (ref 0.1–1)
BSA FOR ECHO PROCEDURE: 1.77 M2
BUN SERPL-MCNC: 6 MG/DL (ref 6–20)
CALCIUM SERPL-MCNC: 8.3 MG/DL (ref 8.7–10.5)
CHLORIDE SERPL-SCNC: 105 MMOL/L (ref 95–110)
CO2 SERPL-SCNC: 24 MMOL/L (ref 23–29)
CREAT SERPL-MCNC: 0.7 MG/DL (ref 0.5–1.4)
CV ECHO LV RWT: 0.19 CM
DIFFERENTIAL METHOD: ABNORMAL
DOP CALC AO PEAK VEL: 1.35 M/S
DOP CALC AO VTI: 22.29 CM
DOP CALC LVOT AREA: 5.3 CM2
DOP CALC LVOT DIAMETER: 2.59 CM
DOP CALC LVOT PEAK VEL: 0.88 M/S
DOP CALC LVOT STROKE VOLUME: 85.1 CM3
DOP CALCLVOT PEAK VEL VTI: 16.16 CM
E WAVE DECELERATION TIME: 138.35 MSEC
E/A RATIO: 1
E/E' RATIO: 4.6 M/S
ECHO LV POSTERIOR WALL: 0.54 CM (ref 0.6–1.1)
EJECTION FRACTION: 55 %
EOSINOPHIL # BLD AUTO: 0.1 K/UL (ref 0–0.5)
EOSINOPHIL NFR BLD: 1.5 % (ref 0–8)
ERYTHROCYTE [DISTWIDTH] IN BLOOD BY AUTOMATED COUNT: 20.1 % (ref 11.5–14.5)
EST. GFR  (NO RACE VARIABLE): >60 ML/MIN/1.73 M^2
FRACTIONAL SHORTENING: 39 % (ref 28–44)
GLUCOSE SERPL-MCNC: 65 MG/DL (ref 70–110)
HCT VFR BLD AUTO: 41.4 % (ref 40–54)
HGB BLD-MCNC: 13.2 G/DL (ref 14–18)
IMM GRANULOCYTES # BLD AUTO: 0.03 K/UL (ref 0–0.04)
IMM GRANULOCYTES NFR BLD AUTO: 0.4 % (ref 0–0.5)
INTERVENTRICULAR SEPTUM: 0.74 CM (ref 0.6–1.1)
IVRT: 117.03 MSEC
LA MAJOR: 5.33 CM
LA MINOR: 4.79 CM
LA WIDTH: 3.54 CM
LEFT ATRIUM SIZE: 3.34 CM
LEFT ATRIUM VOLUME INDEX MOD: 23.9 ML/M2
LEFT ATRIUM VOLUME INDEX: 28.3 ML/M2
LEFT ATRIUM VOLUME MOD: 42.77 CM3
LEFT ATRIUM VOLUME: 50.71 CM3
LEFT INTERNAL DIMENSION IN SYSTOLE: 3.39 CM (ref 2.1–4)
LEFT VENTRICLE DIASTOLIC VOLUME INDEX: 85.64 ML/M2
LEFT VENTRICLE DIASTOLIC VOLUME: 153.3 ML
LEFT VENTRICLE MASS INDEX: 70 G/M2
LEFT VENTRICLE SYSTOLIC VOLUME INDEX: 26.2 ML/M2
LEFT VENTRICLE SYSTOLIC VOLUME: 46.94 ML
LEFT VENTRICULAR INTERNAL DIMENSION IN DIASTOLE: 5.59 CM (ref 3.5–6)
LEFT VENTRICULAR MASS: 125.04 G
LV LATERAL E/E' RATIO: 4.06 M/S
LV SEPTAL E/E' RATIO: 5.31 M/S
LYMPHOCYTES # BLD AUTO: 2 K/UL (ref 1–4.8)
LYMPHOCYTES NFR BLD: 27.5 % (ref 18–48)
MAGNESIUM SERPL-MCNC: 1.9 MG/DL (ref 1.6–2.6)
MCH RBC QN AUTO: 28.9 PG (ref 27–31)
MCHC RBC AUTO-ENTMCNC: 31.9 G/DL (ref 32–36)
MCV RBC AUTO: 91 FL (ref 82–98)
MONOCYTES # BLD AUTO: 0.8 K/UL (ref 0.3–1)
MONOCYTES NFR BLD: 11 % (ref 4–15)
MV PEAK A VEL: 0.69 M/S
MV PEAK E VEL: 0.69 M/S
MV STENOSIS PRESSURE HALF TIME: 40.12 MS
MV VALVE AREA P 1/2 METHOD: 5.48 CM2
NEUTROPHILS # BLD AUTO: 4.3 K/UL (ref 1.8–7.7)
NEUTROPHILS NFR BLD: 58.8 % (ref 38–73)
NRBC BLD-RTO: 0 /100 WBC
PLATELET # BLD AUTO: 288 K/UL (ref 150–450)
PMV BLD AUTO: 9.6 FL (ref 9.2–12.9)
POCT GLUCOSE: 81 MG/DL (ref 70–110)
POTASSIUM SERPL-SCNC: 3.8 MMOL/L (ref 3.5–5.1)
PROT SERPL-MCNC: 6.6 G/DL (ref 6–8.4)
RA MAJOR: 3.56 CM
RA PRESSURE: 8 MMHG
RA WIDTH: 3.31 CM
RBC # BLD AUTO: 4.57 M/UL (ref 4.6–6.2)
RIGHT VENTRICULAR END-DIASTOLIC DIMENSION: 2.92 CM
RV TISSUE DOPPLER FREE WALL SYSTOLIC VELOCITY 1 (APICAL 4 CHAMBER VIEW): 20.33 CM/S
SINUS: 3.39 CM
SODIUM SERPL-SCNC: 140 MMOL/L (ref 136–145)
STJ: 2.63 CM
TDI LATERAL: 0.17 M/S
TDI SEPTAL: 0.13 M/S
TDI: 0.15 M/S
TRICUSPID ANNULAR PLANE SYSTOLIC EXCURSION: 2.57 CM
TSH SERPL DL<=0.005 MIU/L-ACNC: 1.86 UIU/ML (ref 0.4–4)
WBC # BLD AUTO: 7.26 K/UL (ref 3.9–12.7)

## 2022-12-17 PROCEDURE — 93010 EKG 12-LEAD: ICD-10-PCS | Mod: ,,, | Performed by: INTERNAL MEDICINE

## 2022-12-17 PROCEDURE — 84443 ASSAY THYROID STIM HORMONE: CPT | Performed by: FAMILY MEDICINE

## 2022-12-17 PROCEDURE — 85025 COMPLETE CBC W/AUTO DIFF WBC: CPT | Performed by: FAMILY MEDICINE

## 2022-12-17 PROCEDURE — 93010 ELECTROCARDIOGRAM REPORT: CPT | Mod: ,,, | Performed by: INTERNAL MEDICINE

## 2022-12-17 PROCEDURE — 25000003 PHARM REV CODE 250: Performed by: FAMILY MEDICINE

## 2022-12-17 PROCEDURE — 80053 COMPREHEN METABOLIC PANEL: CPT | Performed by: FAMILY MEDICINE

## 2022-12-17 PROCEDURE — 63600175 PHARM REV CODE 636 W HCPCS: Performed by: FAMILY MEDICINE

## 2022-12-17 PROCEDURE — 99220 PR INITIAL OBSERVATION CARE,LEVL III: CPT | Mod: ,,, | Performed by: FAMILY MEDICINE

## 2022-12-17 PROCEDURE — 96376 TX/PRO/DX INJ SAME DRUG ADON: CPT

## 2022-12-17 PROCEDURE — 93005 ELECTROCARDIOGRAM TRACING: CPT

## 2022-12-17 PROCEDURE — 36415 COLL VENOUS BLD VENIPUNCTURE: CPT | Performed by: FAMILY MEDICINE

## 2022-12-17 PROCEDURE — 83735 ASSAY OF MAGNESIUM: CPT | Performed by: FAMILY MEDICINE

## 2022-12-17 PROCEDURE — 99220 PR INITIAL OBSERVATION CARE,LEVL III: ICD-10-PCS | Mod: ,,, | Performed by: FAMILY MEDICINE

## 2022-12-17 PROCEDURE — G0378 HOSPITAL OBSERVATION PER HR: HCPCS

## 2022-12-17 PROCEDURE — 96361 HYDRATE IV INFUSION ADD-ON: CPT

## 2022-12-17 PROCEDURE — 96375 TX/PRO/DX INJ NEW DRUG ADDON: CPT

## 2022-12-17 RX ORDER — KETOROLAC TROMETHAMINE 10 MG/1
10 TABLET, FILM COATED ORAL EVERY 6 HOURS
Qty: 20 TABLET | Refills: 0 | Status: SHIPPED | OUTPATIENT
Start: 2022-12-17 | End: 2022-12-22

## 2022-12-17 RX ADMIN — BICTEGRAVIR SODIUM, EMTRICITABINE, AND TENOFOVIR ALAFENAMIDE FUMARATE 1 TABLET: 50; 200; 25 TABLET ORAL at 08:12

## 2022-12-17 RX ADMIN — Medication 16 G: at 08:12

## 2022-12-17 RX ADMIN — ACETAMINOPHEN 1000 MG: 500 TABLET ORAL at 03:12

## 2022-12-17 RX ADMIN — KETOROLAC TROMETHAMINE 15 MG: 30 INJECTION, SOLUTION INTRAMUSCULAR; INTRAVENOUS at 01:12

## 2022-12-17 RX ADMIN — KETOROLAC TROMETHAMINE 15 MG: 30 INJECTION, SOLUTION INTRAMUSCULAR; INTRAVENOUS at 05:12

## 2022-12-17 NOTE — HPI
22-year-old male with history of asthma, HIV on biktarvy, and polycythemia followed by hematology who presents to the emergency department with chief complaint of facial pain and syncope. He reports that he had a trip and fall 3 days ago.  He struck his face on a metal diverted late on his coffee table.  He states that he broke the plate.  He lost consciousness at the time.  Since the injury, he has had persistent jaw and facial pain as well as a posterior and frontal headache.  He reports multiple episodes of nausea and vomiting.  He reports multiple episodes of syncope.  Some episodes have occurred while changing positions and others have occurred while at rest.  He states that he had a syncopal episode while sitting in the lobby in the emergency department.  No chest pain, shortness of breath.  Taking ibuprofen at home without significant improvement.  He denies other worsening or alleviating factors.     In the ED patient afebrile and hemodynamically stable saturating well on room air. He reports to me poor po intake since fall over cat toy three days ago due to jaw and mouth pain. He states that he does not recall much specifics of the fall our in which way he fell or struck the ground. States that he has also had intermittent nausea with non-bloody emesis. He denies drug or alcohol use and reports being 3yrs sober. CBC and CMP largely unremarkable. CTH, CT cervical, CT maxillofacial without acute process. Orthostatics minimally positive. Patient started on IVFs and admitted to the care of medicine for further evaluation and management.

## 2022-12-17 NOTE — PROGRESS NOTES
12/16/22 2315 12/16/22 2316 12/16/22 2318   Vital Signs   BP (S)  131/83 (S)  120/76 (S)  119/72   MAP (mmHg)  --  94 91   BP Location Right arm Right arm Right arm   BP Method Automatic Automatic Automatic   Patient Position Lying Sitting Standing   Orthostatic VS Yes Yes Yes

## 2022-12-17 NOTE — H&P
[facility] [address] [place] [address] Medicine  History & Physical    Patient Name: Tasia Cardona  MRN: [id number]  Patient Class: OP- Observation  Admission Date: 12/16/2022  Attending Physician: Estela Martínez MD   Primary Care Provider: Primary Doctor No         Patient information was obtained from patient, past medical records and ER records.     Subjective:     Principal Problem:Syncope    Chief Complaint:   Chief Complaint   Patient presents with    Fall     Trip and fall over cat toy 3 days ago. Hit front of face on a metal plate and reports LOC. Reports jaw pain and concern for concussion. Pt dizzy and reports vomiting        HPI: 22-year-old male with history of asthma, HIV on biktarvy, and polycythemia followed by hematology who presents to the emergency department with chief complaint of facial pain and syncope. He reports that he had a trip and fall 3 days ago.  He struck his face on a metal diverted late on his coffee table.  He states that he broke the plate.  He lost consciousness at the time.  Since the injury, he has had persistent jaw and facial pain as well as a posterior and frontal headache.  He reports multiple episodes of nausea and vomiting.  He reports multiple episodes of syncope.  Some episodes have occurred while changing positions and others have occurred while at rest.  He states that he had a syncopal episode while sitting in the lobby in the emergency department.  No chest pain, shortness of breath.  Taking ibuprofen at home without significant improvement.  He denies other worsening or alleviating factors.     In the ED patient afebrile and hemodynamically stable saturating well on room air. He reports to me poor po intake since fall over cat toy three days ago due to jaw and mouth pain. He states that he does not recall much specifics of the fall our in which way he fell or struck the ground. States that he has also had intermittent nausea with non-bloody emesis. He denies drug  or alcohol use and reports being 3yrs sober. CBC and CMP largely unremarkable. CTH, CT cervical, CT maxillofacial without acute process. Orthostatics minimally positive. Patient started on IVFs and admitted to the care of medicine for further evaluation and management.      Past Medical History:   Diagnosis Date    Asthma        No past surgical history on file.    Review of patient's allergies indicates:   Allergen Reactions    [place] Swelling    Pcn [penicillins] Hives    Pecan nut Swelling    Soy     Sulfa (sulfonamide antibiotics) Hives       No current facility-administered medications on file prior to encounter.     Current Outpatient Medications on File Prior to Encounter   Medication Sig    albuterol (PROVENTIL/VENTOLIN HFA) 90 mcg/actuation inhaler Inhale 1-2 puffs into the lungs every 6 (six) hours as needed for Wheezing or Shortness of Breath. Rescue (Patient not taking: Reported on 10/31/2022)    pnczgychd-tuqnmxqn-zxoosli ala (BIKTARVY) -25 mg (25 kg or greater) Take 1 tablet by mouth once daily.    ibuprofen (ADVIL,MOTRIN) 400 MG tablet Take 400 mg by mouth every 4 (four) hours.    nystatin (MYCOSTATIN) 100,000 unit/mL suspension SMARTSI By Mouth 4 Times Daily     Family History       Problem Relation (Age of Onset)    Breast cancer Maternal Grandmother    Cancer Mother, Brother    No Known Problems Father    Ovarian cancer Mother    Pancreatitis Mother          Tobacco Use    Smoking status: Some Days     Types: Cigarettes    Smokeless tobacco: Never   Substance and Sexual Activity    Alcohol use: Yes    Drug use: Never    Sexual activity: Not on file     Review of Systems   Constitutional:  Positive for appetite change and fatigue. Negative for chills, diaphoresis and fever.   HENT:  Negative for sore throat and trouble swallowing.    Eyes:  Negative for photophobia and visual disturbance.   Respiratory:  Negative for cough, shortness of breath and wheezing.    Cardiovascular:   Negative for chest pain, palpitations and leg swelling.   Gastrointestinal:  Positive for nausea and vomiting. Negative for abdominal distention, abdominal pain, constipation and diarrhea.   Genitourinary:  Negative for dysuria and hematuria.   Musculoskeletal:  Positive for myalgias. Negative for neck pain and neck stiffness.   Skin:  Negative for rash and wound.   Neurological:  Positive for syncope and headaches. Negative for seizures, weakness, light-headedness and numbness.   Psychiatric/Behavioral:  Positive for sleep disturbance. Negative for confusion and decreased concentration.    Objective:     Vital Signs (Most Recent):  Temp: 97.8 °F (36.6 °C) (12/16/22 2315)  Pulse: 92 (12/16/22 2315)  Resp: 16 (12/16/22 2315)  BP: (S) 119/72 (12/16/22 2318)  SpO2: 97 % (12/16/22 2315) Vital Signs (24h Range):  Temp:  [97.1 °F (36.2 °C)-97.8 °F (36.6 °C)] 97.8 °F (36.6 °C)  Pulse:  [] 92  Resp:  [16] 16  SpO2:  [96 %-98 %] 97 %  BP: (119-141)/(72-90) 119/72     Weight: 63.5 kg (140 lb)  Body mass index is 20.09 kg/m².    Physical Exam  Constitutional:       General: He is not in acute distress.     Appearance: He is not toxic-appearing or diaphoretic.   HENT:      Head: Normocephalic and atraumatic.      Nose: Nose normal.   Eyes:      General: No scleral icterus.     Extraocular Movements: Extraocular movements intact.      Pupils: Pupils are equal, round, and reactive to light.      Comments: Injected conjunctiva   Musculoskeletal:         General: Normal range of motion.      Right lower leg: No edema.      Left lower leg: No edema.   Skin:     General: Skin is warm and dry.      Coloration: Skin is not jaundiced.   Neurological:      General: No focal deficit present.      Mental Status: He is alert and oriented to person, place, and time.      Cranial Nerves: No cranial nerve deficit.   Psychiatric:         Behavior: Behavior normal.         Thought Content: Thought content normal.         Judgment:  Judgment normal.      Comments: Elevated mood         CRANIAL NERVES     CN III, IV, VI   Pupils are equal, round, and reactive to light.     Significant Labs: All pertinent labs within the past 24 hours have been reviewed.  CBC:   Recent Labs   Lab 12/16/22 1959   WBC 7.90   HGB 14.3   HCT 44.2        CMP:   Recent Labs   Lab 12/16/22 1959      K 3.8      CO2 26   GLU 69*   BUN 5*   CREATININE 0.7   CALCIUM 9.0   ANIONGAP 15     Urine Studies: No results for input(s): COLORU, APPEARANCEUA, PHUR, SPECGRAV, PROTEINUA, GLUCUA, KETONESU, BILIRUBINUA, OCCULTUA, NITRITE, UROBILINOGEN, LEUKOCYTESUR, RBCUA, WBCUA, BACTERIA, SQUAMEPITHEL, HYALINECASTS in the last 48 hours.    Invalid input(s): ANÍBAL    Significant Imaging: I have reviewed all pertinent imaging results/findings within the past 24 hours.    Assessment/Plan:     * Syncope  - suspect that largely patient's symptoms secondary to post concussive syndrome though unclear by history if initial fall with LOC was truly mechanical or precluding syncopal event.  - follow up UA and Urine tox  - Orthostatics qshift  - ECHO pending  - sp 1L NS  - start NS 250ml/hr for additional 1L  - further management pending clinical course and future study review      Nausea & vomiting  - suspect secondary to post concussive syndrome  - see further management above      Polycythemia  - Hb currently 14  ;  Has been as high as 22 in 2021  - follows with hematology outpatient      HIV infection  - continue home Biktarvy       Last CD4 count was   Lab Results   Component Value Date    ABSOLUTECD4 1084 10/18/2022         VTE Risk Mitigation (From admission, onward)         Ordered     IP VTE LOW RISK PATIENT  Once         12/16/22 2210     Place sequential compression device  Until discontinued         12/16/22 2210                   Dakotah Hannah MD  Department of Hospital Medicine   [facility]

## 2022-12-17 NOTE — NURSING
AVS and discharge education given to pt. All questions answered. IV and tele box removed. Awaiting transport. Will continue to monitor pt.

## 2022-12-17 NOTE — NURSING TRANSFER
Nursing Transfer Note      12/17/2022     Reason patient is being transferred: admission - syncope     Transfer From: ER to [id number]    Transfer via wheelchair    Transfer with cardiac monitoring    Transported by tech    Medicines sent: na    Any special needs or follow-up needed: na    Chart send with patient: Yes    Notified: mother    Patient reassessed at: 12/16/2022 @0970    Upon arrival to floor: cardiac monitor in place, patient oriented to room, call bell in reach, and bed in lowest position

## 2022-12-17 NOTE — PLAN OF CARE
Plan of care reviewed. Safety maintained. Pain med administered PRN.    Problem: Adult Inpatient Plan of Care  Goal: Plan of Care Review  Outcome: MET

## 2022-12-17 NOTE — ED PROVIDER NOTES
Encounter Date: 12/16/2022       History     Chief Complaint   Patient presents with    Fall     Trip and fall over cat toy 3 days ago. Hit front of face on a metal plate and reports LOC. Reports jaw pain and concern for concussion. Pt dizzy and reports vomiting     22-year-old male with history of asthma, HIV presents to the emergency department with chief complaint of facial trauma. He reports that he had a trip and fall 3 days ago.  He struck his face on a metal diverted late on his coffee table.  He states that he broke the plate.  He lost consciousness at the time.  Since the injury, he has had persistent jaw and facial pain as well as a posterior and frontal headache.  He reports multiple episodes of nausea and vomiting.  He reports multiple episodes of syncope.  Some episodes have occurred while changing positions and others have occurred while at rest.  He states that he had a syncopal episode while sitting in the lobby in the emergency department.  No chest pain, shortness of breath.  Taking ibuprofen at home without significant improvement.  He denies other worsening or alleviating factors.     Review of patient's allergies indicates:   Allergen Reactions    [place] Swelling    Pcn [penicillins] Hives    Pecan nut Swelling    Soy     Sulfa (sulfonamide antibiotics) Hives     Past Medical History:   Diagnosis Date    Asthma      No past surgical history on file.  Family History   Problem Relation Age of Onset    Pancreatitis Mother     Cancer Mother     Ovarian cancer Mother     No Known Problems Father     Cancer Brother     Breast cancer Maternal Grandmother      Social History     Tobacco Use    Smoking status: Some Days     Types: Cigarettes    Smokeless tobacco: Never   Substance Use Topics    Alcohol use: Yes    Drug use: Never     Review of Systems   Constitutional:  Negative for fever.   HENT:  Negative for sore throat.    Respiratory:  Negative for shortness of breath.    Cardiovascular:  Negative  for chest pain.   Gastrointestinal:  Positive for nausea and vomiting.   Genitourinary:  Negative for dysuria.   Musculoskeletal:  Negative for back pain.   Skin:  Negative for rash.   Neurological:  Positive for syncope, light-headedness and headaches. Negative for weakness.   Hematological:  Does not bruise/bleed easily.     Physical Exam     Initial Vitals [12/16/22 1833]   BP Pulse Resp Temp SpO2   (!) 141/90 108 16 97.1 °F (36.2 °C) 96 %      MAP       --         Physical Exam    Nursing note and vitals reviewed.  Constitutional: He appears well-developed and well-nourished. He is not diaphoretic. No distress.   HENT:   Head: Normocephalic and atraumatic.   Dry mucous membrane   Eyes: EOM are normal. Pupils are equal, round, and reactive to light.   Neck: Neck supple.   Normal range of motion.  Cardiovascular:  Normal rate, regular rhythm, normal heart sounds and intact distal pulses.     Exam reveals no gallop and no friction rub.       No murmur heard.  Pulmonary/Chest: Breath sounds normal. He has no wheezes. He has no rhonchi. He has no rales.   Abdominal: Abdomen is soft. Bowel sounds are normal. There is no abdominal tenderness.   Musculoskeletal:         General: Tenderness present. Normal range of motion.      Cervical back: Normal range of motion and neck supple.      Comments: TTP to lower C-spine  Strength 5/5 bilateral upper and lower extremity  Sensation intact  2+ distal pulse     Neurological: He is alert and oriented to person, place, and time. He has normal strength. GCS score is 15. GCS eye subscore is 4. GCS verbal subscore is 5. GCS motor subscore is 6.   Skin: Skin is warm and dry. Capillary refill takes less than 2 seconds.   Psychiatric: He has a normal mood and affect. His behavior is normal. Judgment and thought content normal.       ED Course   Procedures  Labs Reviewed   CBC W/ AUTO DIFFERENTIAL - Abnormal; Notable for the following components:       Result Value    RDW 20.7 (*)      MPV 8.7 (*)     All other components within normal limits   BASIC METABOLIC PANEL - Abnormal; Notable for the following components:    Glucose 69 (*)     BUN 5 (*)     All other components within normal limits   URINALYSIS, REFLEX TO URINE CULTURE   DRUG SCREEN PANEL, URINE EMERGENCY     EKG Readings: (Independently Interpreted)   Initial Reading: No STEMI. Rhythm: Normal Sinus Rhythm. Heart Rate: 71.     Imaging Results              CT Head Without Contrast (Final result)  Result time 12/16/22 20:31:57      Final result by Nimesh Stockton MD (12/16/22 20:31:57)                   Impression:      No acute intracranial abnormalities      Electronically signed by: Nimesh Stockton MD  Date:    12/16/2022  Time:    20:31               Narrative:    EXAMINATION:  CT HEAD WITHOUT CONTRAST    CLINICAL HISTORY:  Head trauma, repeat vomiting (Age 19-64y);    TECHNIQUE:  Low dose axial images were obtained through the head.  Coronal and sagittal reformations were also performed. Contrast was not administered.    COMPARISON:  None.    FINDINGS:  The brain parenchyma appears normal for age with good corticomedullary differentiation.  There is no evidence of acute infarct, hemorrhage, or mass.  The ventricular system is normal in size.  No mass-effect or midline shift.  There are no abnormal extra-axial fluid collections.  The paranasal sinuses and mastoid air cells are clear.  The calvarium appears intact.  .                                       CT Maxillofacial Without Contrast (Final result)  Result time 12/16/22 20:38:19      Final result by Nimesh Stockton MD (12/16/22 20:38:19)                   Impression:      No acute maxillofacial fracture.      Electronically signed by: Nimesh Stockton MD  Date:    12/16/2022  Time:    20:38               Narrative:    EXAMINATION:  CT MAXILLOFACIAL WITHOUT CONTRAST    CLINICAL HISTORY:  Facial trauma, blunt;    TECHNIQUE:  Low dose axial images, sagittal and coronal  reformations were obtained through the face.  Contrast was not administered.    COMPARISON:  None    FINDINGS:    No acute maxillofacial fracture.The bony orbits, zygomatic arches and mandible appear intact.Some mucoperiosteal thickening in the bilateral maxillary antra and posterior ethmoid air cell complex on the right, the remaining paranasal sinuses are otherwise aerated and clear.  Orbital contents appear unremarkable.                                       CT Cervical Spine Without Contrast (Final result)  Result time 12/16/22 20:34:34      Final result by Nimesh Stockton MD (12/16/22 20:34:34)                   Impression:      No acute cervical fracture.      Electronically signed by: Nimesh Stockton MD  Date:    12/16/2022  Time:    20:34               Narrative:    EXAMINATION:  CT CERVICAL SPINE WITHOUT CONTRAST    CLINICAL HISTORY:  Neck trauma, midline tenderness (Age 16-64y);    TECHNIQUE:  Low dose axial images, sagittal and coronal reformations were performed though the cervical spine.  Contrast was not administered.    COMPARISON:  None    FINDINGS:    Normal alignment. No prevertebral soft tissue thickening. The craniocervical junction, the dens, cervical vertebra and cervical intervertebral disc spaces appear adequately maintained.                                       Medications   sodium chloride 0.9% bolus 1,000 mL 1,000 mL (1,000 mLs Intravenous New Bag 12/16/22 2000)   acetaminophen tablet 650 mg (650 mg Oral Given 12/16/22 2000)   prochlorperazine injection Soln 10 mg (10 mg Intravenous Given 12/16/22 2000)     Medical Decision Making:   History:   Old Medical Records: I decided to obtain old medical records.  Initial Assessment:   Emergent evaluation of a 22 y.o. male presenting to the emergency department complaining of facial trauma that occurred 3 days ago.  He lost consciousness at the time.  Since then, he has had persistent facial pain and headache, nausea, vomiting syncope. Patient  is afebrile, hemodynamically stable, and non toxic appearing.  Will order labs, imaging, fluids, EKG.    Differential Diagnosis:   Differential diagnosis includes but isn't limited to skull fracture, facial fracture, SDH, concussion, cardiac dysrhythmia.   Clinical Tests:   Lab Tests: Ordered and Reviewed  Radiological Study: Ordered and Reviewed  Medical Tests: Ordered and Reviewed  ED Management:  No severe metabolic or hematologic derangements.  CT head, maxillofacial, and cervical spine are without abnormalities.  I do suspect that patient has a concussion.  However, this does not account for the multiple syncopal episodes that he has had.  He is also had an episode while sitting at rest.  Will place in observation with Hospital Medicine.  Discussed plan with patient who is agreeable.  All questions answered.  The patient's history, physical exam, and plan of care was discussed with and agreed upon with my supervising physician.                         Clinical Impression:   Final diagnoses:  [R55] Syncope (Primary)  [S09.90XA] Closed head injury, initial encounter  [S06.0X1A] Concussion with loss of consciousness of 30 minutes or less, initial encounter  [R11.2] Nausea and vomiting, unspecified vomiting type        ED Disposition Condition    Observation Stable                Aretha Nice PA-C  12/16/22 9849

## 2022-12-17 NOTE — PROGRESS NOTES
"Tele just called me to say patient has been having "multiple bradycardic episodes, PVCs, PACs, PJCs, and tiny pauses (sinus arrhythmia)".  I went to check on him and he said he occasionally feels like his heart is beating out of his chest. CALI on call made aware and EKG ordered. Will continue to monitor.   "

## 2022-12-17 NOTE — SUBJECTIVE & OBJECTIVE
Past Medical History:   Diagnosis Date    Asthma        No past surgical history on file.    Review of patient's allergies indicates:   Allergen Reactions    [place] Swelling    Pcn [penicillins] Hives    Pecan nut Swelling    Soy     Sulfa (sulfonamide antibiotics) Hives       No current facility-administered medications on file prior to encounter.     Current Outpatient Medications on File Prior to Encounter   Medication Sig    albuterol (PROVENTIL/VENTOLIN HFA) 90 mcg/actuation inhaler Inhale 1-2 puffs into the lungs every 6 (six) hours as needed for Wheezing or Shortness of Breath. Rescue (Patient not taking: Reported on 10/31/2022)    sjpbygjvw-imuvrtzd-uwcfept ala (BIKTARVY) -25 mg (25 kg or greater) Take 1 tablet by mouth once daily.    ibuprofen (ADVIL,MOTRIN) 400 MG tablet Take 400 mg by mouth every 4 (four) hours.    nystatin (MYCOSTATIN) 100,000 unit/mL suspension SMARTSI By Mouth 4 Times Daily     Family History       Problem Relation (Age of Onset)    Breast cancer Maternal Grandmother    Cancer Mother, Brother    No Known Problems Father    Ovarian cancer Mother    Pancreatitis Mother          Tobacco Use    Smoking status: Some Days     Types: Cigarettes    Smokeless tobacco: Never   Substance and Sexual Activity    Alcohol use: Yes    Drug use: Never    Sexual activity: Not on file     Review of Systems   Constitutional:  Positive for appetite change and fatigue. Negative for chills, diaphoresis and fever.   HENT:  Negative for sore throat and trouble swallowing.    Eyes:  Negative for photophobia and visual disturbance.   Respiratory:  Negative for cough, shortness of breath and wheezing.    Cardiovascular:  Negative for chest pain, palpitations and leg swelling.   Gastrointestinal:  Positive for nausea and vomiting. Negative for abdominal distention, abdominal pain, constipation and diarrhea.   Genitourinary:  Negative for dysuria and hematuria.   Musculoskeletal:  Positive for myalgias.  Negative for neck pain and neck stiffness.   Skin:  Negative for rash and wound.   Neurological:  Positive for syncope and headaches. Negative for seizures, weakness, light-headedness and numbness.   Psychiatric/Behavioral:  Positive for sleep disturbance. Negative for confusion and decreased concentration.    Objective:     Vital Signs (Most Recent):  Temp: 97.8 °F (36.6 °C) (12/16/22 2315)  Pulse: 92 (12/16/22 2315)  Resp: 16 (12/16/22 2315)  BP: (S) 119/72 (12/16/22 2318)  SpO2: 97 % (12/16/22 2315) Vital Signs (24h Range):  Temp:  [97.1 °F (36.2 °C)-97.8 °F (36.6 °C)] 97.8 °F (36.6 °C)  Pulse:  [] 92  Resp:  [16] 16  SpO2:  [96 %-98 %] 97 %  BP: (119-141)/(72-90) 119/72     Weight: 63.5 kg (140 lb)  Body mass index is 20.09 kg/m².    Physical Exam  Constitutional:       General: He is not in acute distress.     Appearance: He is not toxic-appearing or diaphoretic.   HENT:      Head: Normocephalic and atraumatic.      Nose: Nose normal.   Eyes:      General: No scleral icterus.     Extraocular Movements: Extraocular movements intact.      Pupils: Pupils are equal, round, and reactive to light.      Comments: Injected conjunctiva   Musculoskeletal:         General: Normal range of motion.      Right lower leg: No edema.      Left lower leg: No edema.   Skin:     General: Skin is warm and dry.      Coloration: Skin is not jaundiced.   Neurological:      General: No focal deficit present.      Mental Status: He is alert and oriented to person, place, and time.      Cranial Nerves: No cranial nerve deficit.   Psychiatric:         Behavior: Behavior normal.         Thought Content: Thought content normal.         Judgment: Judgment normal.      Comments: Elevated mood         CRANIAL NERVES     CN III, IV, VI   Pupils are equal, round, and reactive to light.     Significant Labs: All pertinent labs within the past 24 hours have been reviewed.  CBC:   Recent Labs   Lab 12/16/22 1959   WBC 7.90   HGB 14.3   HCT  44.2        CMP:   Recent Labs   Lab 12/16/22 1959      K 3.8      CO2 26   GLU 69*   BUN 5*   CREATININE 0.7   CALCIUM 9.0   ANIONGAP 15     Urine Studies: No results for input(s): COLORU, APPEARANCEUA, PHUR, SPECGRAV, PROTEINUA, GLUCUA, KETONESU, BILIRUBINUA, OCCULTUA, NITRITE, UROBILINOGEN, LEUKOCYTESUR, RBCUA, WBCUA, BACTERIA, SQUAMEPITHEL, HYALINECASTS in the last 48 hours.    Invalid input(s): ANÍBAL    Significant Imaging: I have reviewed all pertinent imaging results/findings within the past 24 hours.

## 2022-12-17 NOTE — ED NOTES
Pt care assumed. Report received by ROLANDO Lyn. Pt lying in stretcher in low and locked position and side rails raised x2. Call light, pt's belongings, and bedside table within pt's reach. Pt on continuous cardiac monitoring, pulse oximetry, and BP cycling every 30 minutes. Pt in NAD and verbalized no needs at this time.

## 2022-12-17 NOTE — ED NOTES
Telemetry Verification   Patient placed on Telemetry Box  Verified with War Room  Box # [id number]   Monitor Tech Tiffany   Rate 84   Rhythm Sinus

## 2022-12-17 NOTE — NURSING
Pt lying semi-fowlers in bed with eyes open. AAOx4. Respirations are even and unlabored with NAD. RA. IV site is CDI. Saline locked. Bed locked in the lowest position with side rails up x2. Call bell within reach. No complaints at this time.

## 2022-12-19 NOTE — HOSPITAL COURSE
Andra Cardona is a 22 y.o. male admitted to observation for a syncopal event. After further workup, syncope likely 2/2 hypovolemia. Imaging show no acute fractures. Patient tolerated diet well. Instructed patient the blood sugar was low on admission due to poor PO intake. Echo wnl. Patient is stable for discharge home. PO Toradol given for pain management. All questions answered at beside with verbal understanding. Return precautions given.

## 2022-12-19 NOTE — DISCHARGE SUMMARY
[facility] - Observation [address] [place] [address] Medicine  Discharge Summary      Patient Name: Tasia Cardona  MRN: [id number]  HEATHER: [id number]  Patient Class: OP- Observation  Admission Date: 12/16/2022  Hospital Length of Stay: 0 days  Discharge Date and Time:  12/19/2022 5:29 PM  Attending Physician: Brittany att. providers found   Discharging Provider: Irene Raymundo PA-C  Primary Care Provider: Primary Doctor Brittany  [facility] Medicine Team: [facility] Irene Raymundo PA-C  Primary Care Team: [facility]    HPI:   22-year-old male with history of asthma, HIV on biktarvy, and polycythemia followed by hematology who presents to the emergency department with chief complaint of facial pain and syncope. He reports that he had a trip and fall 3 days ago.  He struck his face on a metal diverted late on his coffee table.  He states that he broke the plate.  He lost consciousness at the time.  Since the injury, he has had persistent jaw and facial pain as well as a posterior and frontal headache.  He reports multiple episodes of nausea and vomiting.  He reports multiple episodes of syncope.  Some episodes have occurred while changing positions and others have occurred while at rest.  He states that he had a syncopal episode while sitting in the lobby in the emergency department.  No chest pain, shortness of breath.  Taking ibuprofen at home without significant improvement.  He denies other worsening or alleviating factors.     In the ED patient afebrile and hemodynamically stable saturating well on room air. He reports to me poor po intake since fall over cat toy three days ago due to jaw and mouth pain. He states that he does not recall much specifics of the fall our in which way he fell or struck the ground. States that he has also had intermittent nausea with non-bloody emesis. He denies drug or alcohol use and reports being 3yrs sober. CBC and CMP largely unremarkable. CTH, CT cervical, CT maxillofacial without acute process.  Orthostatics minimally positive. Patient started on IVFs and admitted to the care of medicine for further evaluation and management.      * No surgery found *      Hospital Course:   Andra Cardona is a 22 y.o. male admitted to observation for a syncopal event. After further workup, syncope likely 2/2 hypovolemia. Imaging show no acute fractures. Patient tolerated diet well. Instructed patient the blood sugar was low on admission due to poor PO intake. Echo wnl. Patient is stable for discharge home. PO Toradol given for pain management. All questions answered at beside with verbal understanding. Return precautions given.        Goals of Care Treatment Preferences:  Code Status: Full Code      Consults:     * Syncope  - suspect that largely patient's symptoms secondary to post concussive syndrome though unclear by history if initial fall with LOC was truly mechanical or precluding syncopal event.  - patient hypoglycemic 2/2 decreased PO intake  - follow up UA and Urine tox  - Orthostatics qshift  - sp 1L NS  - start NS 250ml/hr for additional 1L  - given toradol for pain management   - further management pending clinical course and future study review  Results for orders placed during the hospital encounter of 12/16/22    Echo    Interpretation Summary  · The left ventricle is normal in size with normal systolic function.  · The estimated ejection fraction is 55%.  · Normal left ventricular diastolic function.  · Normal right ventricular size with normal right ventricular systolic function.  · Intermediate central venous pressure (8 mmHg).    Polycythemia  - Hb currently 14  ;  Has been as high as 22 in 2021  - follows with hematology outpatient      HIV infection  - continue home Biktarvy       Last CD4 count was   Lab Results   Component Value Date    ABSOLUTECD4 1084 10/18/2022         Final Active Diagnoses:    Diagnosis Date Noted POA    PRINCIPAL PROBLEM:  Syncope [R55] 12/17/2022 Yes    Nausea & vomiting  [R11.2] 2022 Yes    Polycythemia [D75.1] 2021 Yes    HIV infection [B20] 2021 Yes      Problems Resolved During this Admission:       Discharged Condition: stable    Disposition: Home or Self Care    Follow Up:    Patient Instructions:      Ambulatory referral/consult to Concussion Management Program   Standing Status: Future   Referral Priority: Routine Referral Type: Consultation   Referral Reason: Specialty Services Required   Requested Specialty: Neurology   Number of Visits Requested: 1     Diet Adult Regular     Notify your health care provider if you experience any of the following:  temperature >100.4     Notify your health care provider if you experience any of the following:  persistent nausea and vomiting or diarrhea     Notify your health care provider if you experience any of the following:  persistent dizziness, light-headedness, or visual disturbances     Notify your health care provider if you experience any of the following:  increased confusion or weakness     Activity as tolerated       Significant Diagnostic Studies: Labs: BMP: No results for input(s): GLU, NA, K, CL, CO2, BUN, CREATININE, CALCIUM, MG in the last 48 hours. and CBC No results for input(s): WBC, HGB, HCT, PLT in the last 48 hours.    Pending Diagnostic Studies:     None         Medications:  Reconciled Home Medications:      Medication List      START taking these medications    ketorolac 10 mg tablet  Commonly known as: TORADOL  Take 1 tablet (10 mg total) by mouth every 6 (six) hours. for 5 days        CONTINUE taking these medications    BIKTARVY -25 mg (25 kg or greater)  Generic drug: zguovpwbr-ailanwkj-hlyqpsu ala  Take 1 tablet by mouth once daily.     ibuprofen 400 MG tablet  Commonly known as: ADVIL,MOTRIN  Take 400 mg by mouth every 4 (four) hours.     nystatin 100,000 unit/mL suspension  Commonly known as: MYCOSTATIN  SMARTSI By Mouth 4 Times Daily        ASK your doctor about these  medications    albuterol 90 mcg/actuation inhaler  Commonly known as: PROVENTIL/VENTOLIN HFA  Inhale 1-2 puffs into the lungs every 6 (six) hours as needed for Wheezing or Shortness of Breath. Rescue            Indwelling Lines/Drains at time of discharge:   Lines/Drains/Airways     None                 Time spent on the discharge of patient: 36 minutes         Irene Raymundo PA-C  Department of Hospital Medicine  [facility] - Observation 11H

## 2022-12-19 NOTE — ASSESSMENT & PLAN NOTE
- Hb currently 14  ;  Has been as high as 22 in 2021  - follows with hematology outpatient    
- Hb currently 14  ;  Has been as high as 22 in 2021  - follows with hematology outpatient    
- continue home Biktarvy       Last CD4 count was   Lab Results   Component Value Date    ABSOLUTECD4 1084 10/18/2022     
- continue home Biktarvy       Last CD4 count was   Lab Results   Component Value Date    ABSOLUTECD4 1084 10/18/2022     
- suspect secondary to post concussive syndrome  - see further management above    
- suspect that largely patient's symptoms secondary to post concussive syndrome though unclear by history if initial fall with LOC was truly mechanical or precluding syncopal event.  - follow up UA and Urine tox  - Orthostatics qshift  - ECHO pending  - sp 1L NS  - start NS 250ml/hr for additional 1L  - further management pending clinical course and future study review    
- suspect that largely patient's symptoms secondary to post concussive syndrome though unclear by history if initial fall with LOC was truly mechanical or precluding syncopal event.  - patient hypoglycemic 2/2 decreased PO intake  - follow up UA and Urine tox  - Orthostatics qshift  - sp 1L NS  - start NS 250ml/hr for additional 1L  - given toradol for pain management   - further management pending clinical course and future study review  Results for orders placed during the hospital encounter of 12/16/22    Echo    Interpretation Summary  · The left ventricle is normal in size with normal systolic function.  · The estimated ejection fraction is 55%.  · Normal left ventricular diastolic function.  · Normal right ventricular size with normal right ventricular systolic function.  · Intermediate central venous pressure (8 mmHg).  
negative...

## 2023-01-11 DIAGNOSIS — B20 HIV INFECTION, UNSPECIFIED SYMPTOM STATUS: Primary | ICD-10-CM

## 2023-01-11 RX ORDER — BICTEGRAVIR SODIUM, EMTRICITABINE, AND TENOFOVIR ALAFENAMIDE FUMARATE 50; 200; 25 MG/1; MG/1; MG/1
1 TABLET ORAL DAILY
Qty: 30 TABLET | Refills: 5 | Status: ON HOLD | OUTPATIENT
Start: 2023-01-11 | End: 2023-10-10 | Stop reason: HOSPADM

## 2023-02-09 ENCOUNTER — PATIENT MESSAGE (OUTPATIENT)
Dept: HEMATOLOGY/ONCOLOGY | Facility: CLINIC | Age: 23
End: 2023-02-09
Payer: MEDICAID

## 2023-02-09 ENCOUNTER — OFFICE VISIT (OUTPATIENT)
Dept: HEMATOLOGY/ONCOLOGY | Facility: CLINIC | Age: 23
End: 2023-02-09
Payer: MEDICAID

## 2023-02-09 ENCOUNTER — HOSPITAL ENCOUNTER (OUTPATIENT)
Dept: TRANSFUSION MEDICINE | Facility: HOSPITAL | Age: 23
Discharge: HOME | End: 2023-02-09
Attending: INTERNAL MEDICINE
Payer: MEDICAID

## 2023-02-09 VITALS
HEIGHT: 70 IN | DIASTOLIC BLOOD PRESSURE: 92 MMHG | OXYGEN SATURATION: 95 % | WEIGHT: 151.44 LBS | BODY MASS INDEX: 21.68 KG/M2 | SYSTOLIC BLOOD PRESSURE: 113 MMHG | HEART RATE: 122 BPM | RESPIRATION RATE: 16 BRPM

## 2023-02-09 DIAGNOSIS — E83.110 HEREDITARY HEMOCHROMATOSIS: Primary | ICD-10-CM

## 2023-02-09 DIAGNOSIS — D75.1 POLYCYTHEMIA: Primary | ICD-10-CM

## 2023-02-09 DIAGNOSIS — Z00.00 WELLNESS EXAMINATION: ICD-10-CM

## 2023-02-09 DIAGNOSIS — B20 HIV INFECTION, UNSPECIFIED SYMPTOM STATUS: ICD-10-CM

## 2023-02-09 DIAGNOSIS — D75.1 POLYCYTHEMIA: ICD-10-CM

## 2023-02-09 PROCEDURE — 3074F SYST BP LT 130 MM HG: CPT | Mod: CPTII,,, | Performed by: INTERNAL MEDICINE

## 2023-02-09 PROCEDURE — 3008F BODY MASS INDEX DOCD: CPT | Mod: CPTII,,, | Performed by: INTERNAL MEDICINE

## 2023-02-09 PROCEDURE — 1159F PR MEDICATION LIST DOCUMENTED IN MEDICAL RECORD: ICD-10-PCS | Mod: CPTII,,, | Performed by: INTERNAL MEDICINE

## 2023-02-09 PROCEDURE — 1159F MED LIST DOCD IN RCRD: CPT | Mod: CPTII,,, | Performed by: INTERNAL MEDICINE

## 2023-02-09 PROCEDURE — 99999 PR PBB SHADOW E&M-EST. PATIENT-LVL III: ICD-10-PCS | Mod: PBBFAC,,, | Performed by: INTERNAL MEDICINE

## 2023-02-09 PROCEDURE — 99215 OFFICE O/P EST HI 40 MIN: CPT | Mod: S$PBB,,, | Performed by: INTERNAL MEDICINE

## 2023-02-09 PROCEDURE — 99999 PR PBB SHADOW E&M-EST. PATIENT-LVL III: CPT | Mod: PBBFAC,,, | Performed by: INTERNAL MEDICINE

## 2023-02-09 PROCEDURE — 99213 OFFICE O/P EST LOW 20 MIN: CPT | Mod: PBBFAC | Performed by: INTERNAL MEDICINE

## 2023-02-09 PROCEDURE — 99195 PHLEBOTOMY: CPT

## 2023-02-09 PROCEDURE — 3080F PR MOST RECENT DIASTOLIC BLOOD PRESSURE >= 90 MM HG: ICD-10-PCS | Mod: CPTII,,, | Performed by: INTERNAL MEDICINE

## 2023-02-09 PROCEDURE — 3080F DIAST BP >= 90 MM HG: CPT | Mod: CPTII,,, | Performed by: INTERNAL MEDICINE

## 2023-02-09 PROCEDURE — 99215 PR OFFICE/OUTPT VISIT, EST, LEVL V, 40-54 MIN: ICD-10-PCS | Mod: S$PBB,,, | Performed by: INTERNAL MEDICINE

## 2023-02-09 PROCEDURE — 3074F PR MOST RECENT SYSTOLIC BLOOD PRESSURE < 130 MM HG: ICD-10-PCS | Mod: CPTII,,, | Performed by: INTERNAL MEDICINE

## 2023-02-09 PROCEDURE — 3008F PR BODY MASS INDEX (BMI) DOCUMENTED: ICD-10-PCS | Mod: CPTII,,, | Performed by: INTERNAL MEDICINE

## 2023-02-09 NOTE — PROGRESS NOTES
Hematology and Medical Oncology   Follow Up     02/09/2023      History of Present Ilness:   Tasia Cardona (Tasia) is a pleasant 22 y.o.male who presents today to discuss elevated [but improving] hemoglobin. Earlier this month hemoglobin was 22.2 and has decreased in the last week to 18.8.     Presents today at [facility] clinic for clarification of diagnosis code attached to his previous lab.Verbalized he researched many sites and nervous about what he read on the Internet. Patient appeared nervous during visit. Explained him about his recent visit and lab work. No acute events since last visit. Denies increased pruritis after a hot shower. No  blurry vision. Headache present for few days, taking OTC meds. Will repeat lab work after today's visit.  Does have significant injected conjuctiva, no longer on optho drops, has been improving.      Interval History:  Mr. Cardona is doing well. Tolerated therapeutic phlebotomies without issue. Could tell when he needed phlebotomy based on headaches or itching in a hot shower. Not currently experiencing these symptoms.     Hematocrit is above goal. Will schedule phlebotomies on his own and prn.       PAST MEDICAL HISTORY:   Past Medical History:   Diagnosis Date    Asthma        PAST SURGICAL HISTORY:   No past surgical history on file.    PAST SOCIAL HISTORY:   reports that he has been smoking cigarettes. He has never used smokeless tobacco. He reports current alcohol use. He reports that he does not use drugs.    FAMILY HISTORY:  Family History   Problem Relation Age of Onset    Pancreatitis Mother     Cancer Mother     Ovarian cancer Mother     No Known Problems Father     Cancer Brother     Breast cancer Maternal Grandmother        CURRENT MEDICATIONS:   Current Outpatient Medications   Medication Sig    albuterol (PROVENTIL/VENTOLIN HFA) 90 mcg/actuation inhaler Inhale 1-2 puffs into the lungs every 6 (six) hours as needed for Wheezing or Shortness of Breath. Rescue  (Patient not taking: Reported on 10/31/2022)    bexfdzwes-uxptjbii-vansahk ala (BIKTARVY) -25 mg (25 kg or greater) Take 1 tablet by mouth once daily.    ibuprofen (ADVIL,MOTRIN) 400 MG tablet Take 400 mg by mouth every 4 (four) hours.    nystatin (MYCOSTATIN) 100,000 unit/mL suspension SMARTSI By Mouth 4 Times Daily     No current facility-administered medications for this visit.     ALLERGIES:   Review of patient's allergies indicates:   Allergen Reactions    [place] Swelling    Pcn [penicillins] Hives    Pecan nut Swelling    Soy     Sulfa (sulfonamide antibiotics) Hives         Review of Systems:     Review of Systems   Constitutional:  Negative for appetite change, chills, diaphoresis, fatigue, fever and unexpected weight change.   HENT:   Negative for hearing loss, mouth sores, nosebleeds, trouble swallowing and voice change.         Headache when due for phlebotomy   Eyes:  Negative for eye problems and icterus.   Respiratory:  Negative for chest tightness, cough, hemoptysis, shortness of breath and wheezing.    Cardiovascular:  Negative for chest pain, leg swelling and palpitations.   Gastrointestinal:  Negative for abdominal distention, abdominal pain, blood in stool, diarrhea, nausea and vomiting.   Endocrine: Negative for hot flashes.   Genitourinary:  Negative for bladder incontinence, difficulty urinating, dysuria and hematuria.    Musculoskeletal:  Negative for arthralgias, back pain, flank pain, gait problem, myalgias, neck pain and neck stiffness.   Skin:  Negative for itching, rash and wound.   Neurological:  Negative for dizziness, extremity weakness, gait problem, headaches, numbness, seizures and speech difficulty.   Hematological:  Negative for adenopathy. Does not bruise/bleed easily.   Psychiatric/Behavioral:  Negative for confusion, depression and sleep disturbance. The patient is not nervous/anxious.         Physical Exam:     Vitals:    23 0906   BP: (!) 113/92   Pulse: (!)  122   Resp: 16     Physical Exam  Constitutional:       General: He is not in acute distress.     Appearance: He is well-developed. He is not diaphoretic.   HENT:      Head: Normocephalic and atraumatic.      Mouth/Throat:      Pharynx: No oropharyngeal exudate.   Eyes:      Pupils: Pupils are equal, round, and reactive to light.   Neck:      Thyroid: No thyromegaly.      Vascular: No JVD.      Trachea: No tracheal deviation.   Cardiovascular:      Rate and Rhythm: Normal rate and regular rhythm.      Heart sounds: Normal heart sounds. No murmur heard.    No friction rub.   Pulmonary:      Effort: Pulmonary effort is normal. No respiratory distress.      Breath sounds: Normal breath sounds. No stridor. No wheezing or rales.   Chest:      Chest wall: No tenderness.   Abdominal:      General: Bowel sounds are normal. There is no distension.      Palpations: Abdomen is soft.      Tenderness: There is no abdominal tenderness. There is no guarding or rebound.   Musculoskeletal:         General: No tenderness or deformity. Normal range of motion.      Cervical back: Normal range of motion and neck supple.   Skin:     General: Skin is warm and dry.      Capillary Refill: Capillary refill takes less than 2 seconds.      Coloration: Skin is not pale.      Findings: No erythema or rash.   Neurological:      Mental Status: He is alert and oriented to person, place, and time.      Cranial Nerves: No cranial nerve deficit.      Sensory: No sensory deficit.      Motor: No abnormal muscle tone.      Coordination: Coordination normal.      Deep Tendon Reflexes: Reflexes normal.   Psychiatric:         Mood and Affect: Mood is anxious.         Behavior: Behavior normal.         Thought Content: Thought content normal.         Judgment: Judgment normal.       ECOG Performance Status: (foot note - ECOG PS provided by Eastern Cooperative Oncology Group) 0 - Asymptomatic    Karnofsky Performance Score:  100%- Normal, No Complaints, No  Evidence of Disease    Labs:   Lab Results   Component Value Date    WBC 6.39 02/09/2023    HGB 15.9 02/09/2023    HCT 48.9 02/09/2023     02/09/2023    ALT 56 (H) 12/17/2022    AST 66 (H) 12/17/2022     12/17/2022    K 3.8 12/17/2022     12/17/2022    CREATININE 0.7 12/17/2022    BUN 6 12/17/2022    CO2 24 12/17/2022    TSH 1.858 12/17/2022     MPN panel: negative for JAK2 mutation, PENG-R and MPL    Imaging: Previous imaging has been reviewed     Assessment and Plan:   Kelly Cardona is a 22 year old male with an elevated hemoglobin     Polycythemia  --Improving steadily over the second half of last year, now increasing  --Denies testosterone supplementation  --MPN panel is negative  --Plan for monthly phlebotomy to maintain hct at goal  --Goal to keep hct <45. Continue low dose ASA  --Education and reassurance given, will close monitor lab    HIV  --Viral load is currently undetectable  --Continue management as per ID    Follow Up  Need phlebotomy if hct>45  See me in 3 months    Roxy Daniels MD            BMT Chart Routing      Follow up with physician 3 months. 1. see me in 3 months with cbc,cmp   Follow up with JOSE    Provider visit type    Infusion scheduling note    Injection scheduling note    Labs    Imaging    Pharmacy appointment    Other referrals

## 2023-02-10 ENCOUNTER — PATIENT MESSAGE (OUTPATIENT)
Dept: HEMATOLOGY/ONCOLOGY | Facility: CLINIC | Age: 23
End: 2023-02-10
Payer: MEDICAID

## 2023-02-13 NOTE — PROGRESS NOTES
Pt to Blood Bank for therapeutic phlebotomy.  /87  P 101  T 97.8  Hct 50.  1 unit WB removed from  L arm.  Tolerated well.  Written/verbal instructions given.  Pressure wrap applied when hemostasis achieved.  Refreshments accepted.  Ambulatory to and from BB per self.

## 2023-02-16 ENCOUNTER — LAB VISIT (OUTPATIENT)
Dept: LAB | Facility: HOSPITAL | Age: 23
End: 2023-02-16
Attending: INTERNAL MEDICINE
Payer: MEDICAID

## 2023-02-16 DIAGNOSIS — D75.1 POLYCYTHEMIA: ICD-10-CM

## 2023-02-16 LAB
BASOPHILS # BLD AUTO: 0.03 K/UL (ref 0–0.2)
BASOPHILS NFR BLD: 0.6 % (ref 0–1.9)
DIFFERENTIAL METHOD: ABNORMAL
EOSINOPHIL # BLD AUTO: 0.2 K/UL (ref 0–0.5)
EOSINOPHIL NFR BLD: 3.6 % (ref 0–8)
ERYTHROCYTE [DISTWIDTH] IN BLOOD BY AUTOMATED COUNT: 15.9 % (ref 11.5–14.5)
HCT VFR BLD AUTO: 41.7 % (ref 40–54)
HGB BLD-MCNC: 13.4 G/DL (ref 14–18)
IMM GRANULOCYTES # BLD AUTO: 0.01 K/UL (ref 0–0.04)
IMM GRANULOCYTES NFR BLD AUTO: 0.2 % (ref 0–0.5)
LYMPHOCYTES # BLD AUTO: 1.8 K/UL (ref 1–4.8)
LYMPHOCYTES NFR BLD: 35 % (ref 18–48)
MCH RBC QN AUTO: 30 PG (ref 27–31)
MCHC RBC AUTO-ENTMCNC: 32.1 G/DL (ref 32–36)
MCV RBC AUTO: 94 FL (ref 82–98)
MONOCYTES # BLD AUTO: 0.8 K/UL (ref 0.3–1)
MONOCYTES NFR BLD: 15.8 % (ref 4–15)
NEUTROPHILS # BLD AUTO: 2.4 K/UL (ref 1.8–7.7)
NEUTROPHILS NFR BLD: 44.8 % (ref 38–73)
NRBC BLD-RTO: 0 /100 WBC
PLATELET # BLD AUTO: 278 K/UL (ref 150–450)
PMV BLD AUTO: 9.2 FL (ref 9.2–12.9)
RBC # BLD AUTO: 4.46 M/UL (ref 4.6–6.2)
WBC # BLD AUTO: 5.25 K/UL (ref 3.9–12.7)

## 2023-02-16 PROCEDURE — 36415 COLL VENOUS BLD VENIPUNCTURE: CPT | Performed by: INTERNAL MEDICINE

## 2023-02-16 PROCEDURE — 85025 COMPLETE CBC W/AUTO DIFF WBC: CPT | Performed by: INTERNAL MEDICINE

## 2023-03-08 ENCOUNTER — PATIENT MESSAGE (OUTPATIENT)
Dept: HEMATOLOGY/ONCOLOGY | Facility: CLINIC | Age: 23
End: 2023-03-08
Payer: MEDICAID

## 2023-03-20 NOTE — TELEPHONE ENCOUNTER
Left VM for pt in regards to last MyChart message concerning pt vomiting blood. Will try to reach out via [employer] message.

## 2023-03-28 ENCOUNTER — LAB VISIT (OUTPATIENT)
Dept: LAB | Facility: HOSPITAL | Age: 23
End: 2023-03-28
Payer: MEDICAID

## 2023-03-28 ENCOUNTER — OFFICE VISIT (OUTPATIENT)
Dept: INFECTIOUS DISEASES | Facility: CLINIC | Age: 23
End: 2023-03-28
Payer: MEDICAID

## 2023-03-28 VITALS
DIASTOLIC BLOOD PRESSURE: 86 MMHG | TEMPERATURE: 99 F | HEIGHT: 70 IN | WEIGHT: 157.44 LBS | HEART RATE: 97 BPM | SYSTOLIC BLOOD PRESSURE: 117 MMHG | BODY MASS INDEX: 22.54 KG/M2

## 2023-03-28 DIAGNOSIS — R10.13 DYSPEPSIA: ICD-10-CM

## 2023-03-28 DIAGNOSIS — R61 NIGHT SWEATS: ICD-10-CM

## 2023-03-28 DIAGNOSIS — K92.0 HEMATEMESIS WITHOUT NAUSEA: ICD-10-CM

## 2023-03-28 DIAGNOSIS — K13.79 MOUTH PAIN: ICD-10-CM

## 2023-03-28 DIAGNOSIS — B20 HIV INFECTION, UNSPECIFIED SYMPTOM STATUS: Primary | ICD-10-CM

## 2023-03-28 DIAGNOSIS — B20 HIV INFECTION, UNSPECIFIED SYMPTOM STATUS: ICD-10-CM

## 2023-03-28 LAB
ALBUMIN SERPL BCP-MCNC: 4.3 G/DL (ref 3.5–5.2)
ALP SERPL-CCNC: 125 U/L (ref 55–135)
ALT SERPL W/O P-5'-P-CCNC: 45 U/L (ref 10–44)
ANION GAP SERPL CALC-SCNC: 10 MMOL/L (ref 8–16)
AST SERPL-CCNC: 51 U/L (ref 10–40)
BASOPHILS # BLD AUTO: 0.05 K/UL (ref 0–0.2)
BASOPHILS NFR BLD: 0.9 % (ref 0–1.9)
BILIRUB SERPL-MCNC: 0.2 MG/DL (ref 0.1–1)
BUN SERPL-MCNC: 6 MG/DL (ref 6–20)
CALCIUM SERPL-MCNC: 9.6 MG/DL (ref 8.7–10.5)
CHLORIDE SERPL-SCNC: 104 MMOL/L (ref 95–110)
CO2 SERPL-SCNC: 28 MMOL/L (ref 23–29)
CREAT SERPL-MCNC: 0.8 MG/DL (ref 0.5–1.4)
DIFFERENTIAL METHOD: ABNORMAL
EOSINOPHIL # BLD AUTO: 0.3 K/UL (ref 0–0.5)
EOSINOPHIL NFR BLD: 5 % (ref 0–8)
ERYTHROCYTE [DISTWIDTH] IN BLOOD BY AUTOMATED COUNT: 16.7 % (ref 11.5–14.5)
EST. GFR  (NO RACE VARIABLE): >60 ML/MIN/1.73 M^2
GLUCOSE SERPL-MCNC: 81 MG/DL (ref 70–110)
HCT VFR BLD AUTO: 49.9 % (ref 40–54)
HGB BLD-MCNC: 15.4 G/DL (ref 14–18)
IMM GRANULOCYTES # BLD AUTO: 0.02 K/UL (ref 0–0.04)
IMM GRANULOCYTES NFR BLD AUTO: 0.4 % (ref 0–0.5)
LYMPHOCYTES # BLD AUTO: 2.3 K/UL (ref 1–4.8)
LYMPHOCYTES NFR BLD: 42 % (ref 18–48)
MCH RBC QN AUTO: 28.5 PG (ref 27–31)
MCHC RBC AUTO-ENTMCNC: 30.9 G/DL (ref 32–36)
MCV RBC AUTO: 92 FL (ref 82–98)
MONOCYTES # BLD AUTO: 0.6 K/UL (ref 0.3–1)
MONOCYTES NFR BLD: 11.9 % (ref 4–15)
NEUTROPHILS # BLD AUTO: 2.1 K/UL (ref 1.8–7.7)
NEUTROPHILS NFR BLD: 39.8 % (ref 38–73)
NRBC BLD-RTO: 0 /100 WBC
PLATELET # BLD AUTO: 384 K/UL (ref 150–450)
PMV BLD AUTO: 9 FL (ref 9.2–12.9)
POTASSIUM SERPL-SCNC: 4.1 MMOL/L (ref 3.5–5.1)
PROT SERPL-MCNC: 8.5 G/DL (ref 6–8.4)
RBC # BLD AUTO: 5.4 M/UL (ref 4.6–6.2)
SODIUM SERPL-SCNC: 142 MMOL/L (ref 136–145)
WBC # BLD AUTO: 5.38 K/UL (ref 3.9–12.7)

## 2023-03-28 PROCEDURE — 86480 TB TEST CELL IMMUN MEASURE: CPT | Performed by: STUDENT IN AN ORGANIZED HEALTH CARE EDUCATION/TRAINING PROGRAM

## 2023-03-28 PROCEDURE — 87536 HIV-1 QUANT&REVRSE TRNSCRPJ: CPT | Performed by: STUDENT IN AN ORGANIZED HEALTH CARE EDUCATION/TRAINING PROGRAM

## 2023-03-28 PROCEDURE — 99999 PR PBB SHADOW E&M-EST. PATIENT-LVL III: CPT | Mod: PBBFAC,,, | Performed by: STUDENT IN AN ORGANIZED HEALTH CARE EDUCATION/TRAINING PROGRAM

## 2023-03-28 PROCEDURE — 80053 COMPREHEN METABOLIC PANEL: CPT | Performed by: STUDENT IN AN ORGANIZED HEALTH CARE EDUCATION/TRAINING PROGRAM

## 2023-03-28 PROCEDURE — 1159F MED LIST DOCD IN RCRD: CPT | Mod: CPTII,,, | Performed by: STUDENT IN AN ORGANIZED HEALTH CARE EDUCATION/TRAINING PROGRAM

## 2023-03-28 PROCEDURE — 3008F PR BODY MASS INDEX (BMI) DOCUMENTED: ICD-10-PCS | Mod: CPTII,,, | Performed by: STUDENT IN AN ORGANIZED HEALTH CARE EDUCATION/TRAINING PROGRAM

## 2023-03-28 PROCEDURE — 99214 PR OFFICE/OUTPT VISIT, EST, LEVL IV, 30-39 MIN: ICD-10-PCS | Mod: S$PBB,,, | Performed by: STUDENT IN AN ORGANIZED HEALTH CARE EDUCATION/TRAINING PROGRAM

## 2023-03-28 PROCEDURE — 3074F PR MOST RECENT SYSTOLIC BLOOD PRESSURE < 130 MM HG: ICD-10-PCS | Mod: CPTII,,, | Performed by: STUDENT IN AN ORGANIZED HEALTH CARE EDUCATION/TRAINING PROGRAM

## 2023-03-28 PROCEDURE — 3074F SYST BP LT 130 MM HG: CPT | Mod: CPTII,,, | Performed by: STUDENT IN AN ORGANIZED HEALTH CARE EDUCATION/TRAINING PROGRAM

## 2023-03-28 PROCEDURE — 99213 OFFICE O/P EST LOW 20 MIN: CPT | Mod: PBBFAC | Performed by: STUDENT IN AN ORGANIZED HEALTH CARE EDUCATION/TRAINING PROGRAM

## 2023-03-28 PROCEDURE — 99999 PR PBB SHADOW E&M-EST. PATIENT-LVL III: ICD-10-PCS | Mod: PBBFAC,,, | Performed by: STUDENT IN AN ORGANIZED HEALTH CARE EDUCATION/TRAINING PROGRAM

## 2023-03-28 PROCEDURE — 3008F BODY MASS INDEX DOCD: CPT | Mod: CPTII,,, | Performed by: STUDENT IN AN ORGANIZED HEALTH CARE EDUCATION/TRAINING PROGRAM

## 2023-03-28 PROCEDURE — 99214 OFFICE O/P EST MOD 30 MIN: CPT | Mod: S$PBB,,, | Performed by: STUDENT IN AN ORGANIZED HEALTH CARE EDUCATION/TRAINING PROGRAM

## 2023-03-28 PROCEDURE — 36415 COLL VENOUS BLD VENIPUNCTURE: CPT | Performed by: STUDENT IN AN ORGANIZED HEALTH CARE EDUCATION/TRAINING PROGRAM

## 2023-03-28 PROCEDURE — 85025 COMPLETE CBC W/AUTO DIFF WBC: CPT | Performed by: STUDENT IN AN ORGANIZED HEALTH CARE EDUCATION/TRAINING PROGRAM

## 2023-03-28 PROCEDURE — 3079F DIAST BP 80-89 MM HG: CPT | Mod: CPTII,,, | Performed by: STUDENT IN AN ORGANIZED HEALTH CARE EDUCATION/TRAINING PROGRAM

## 2023-03-28 PROCEDURE — 1159F PR MEDICATION LIST DOCUMENTED IN MEDICAL RECORD: ICD-10-PCS | Mod: CPTII,,, | Performed by: STUDENT IN AN ORGANIZED HEALTH CARE EDUCATION/TRAINING PROGRAM

## 2023-03-28 PROCEDURE — 3079F PR MOST RECENT DIASTOLIC BLOOD PRESSURE 80-89 MM HG: ICD-10-PCS | Mod: CPTII,,, | Performed by: STUDENT IN AN ORGANIZED HEALTH CARE EDUCATION/TRAINING PROGRAM

## 2023-03-28 NOTE — PROGRESS NOTES
INFECTIOUS DISEASE CLINIC  03/28/2023     Subjective:      Chief Complaint: HIV, dental pain    History of Present Illness:    22-year-old male with asthma, polycythemia vera presents for HIV follow-up.     Diagnosed on screening test 10/2021.  History of previous gonorrhea 2021.     11/4/2021 - Hep C ab+, viral load negative  GenotypeR - M41L, T215E, L90M     Receives phlebotomy about every other month for polycythemia vera.     6/17/22 CD4 929 viral load undetectable     Interval history:  Recent complaint of facial/dental pain.  2 weeks ago noticed swelling in face and mouth pain, thought it was an allergic reaction.  Facial swelling has since resolved, but now has retching/vomiting with small streaks of blood.  Vomiting only occurs when brushing teeth/showering/lots of coughing.  Does note some intermittent black tarry stools, some intermittent epigatsric discomfort, and sensation of acid reflux for ~2-3 weeks.    Still has night sweats, in the past attributed to anxiety.  No weight loss.    Social history:  Born and raised in [place].  Moved here for college.  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  Lives in [place].  Swims in a chlorinated pool as exercise.  No recent travel.  Sexually active with men.  Smokes 2 cigarettes daily.  Owns 2 cats.  No birds.  [place] 15 years old went [place] [facility] and [facility].        Review of Systems   Constitutional: Positive for night sweats. Negative for chills, fever, weight gain and weight loss.   HENT:  Negative for odynophagia and sore throat.    Eyes:  Negative for visual disturbance.   Cardiovascular:  Negative for chest pain, dyspnea on exertion and leg swelling.   Respiratory:  Negative for cough, shortness of breath, sputum production and wheezing.    Hematologic/Lymphatic: Negative for adenopathy.   Skin:  Negative for rash.   Musculoskeletal:  Negative for joint pain and joint swelling.   Gastrointestinal:  Positive for hematemesis and  vomiting. Negative for abdominal pain, diarrhea and nausea.   Genitourinary:  Negative for dysuria, flank pain and genital sores.   Neurological:  Negative for headaches.   Psychiatric/Behavioral:  Negative for altered mental status.        Past Medical History:   Diagnosis Date    Asthma      No past surgical history on file.  Family History   Problem Relation Age of Onset    Pancreatitis Mother     Cancer Mother     Ovarian cancer Mother     No Known Problems Father     Cancer Brother     Breast cancer Maternal Grandmother      Social History     Tobacco Use    Smoking status: Some Days     Types: Cigarettes    Smokeless tobacco: Never   Substance Use Topics    Alcohol use: Yes    Drug use: Never       Review of patient's allergies indicates:   Allergen Reactions    [place] Swelling    Pcn [penicillins] Hives    Pecan nut Swelling    Soy     Sulfa (sulfonamide antibiotics) Hives         Objective:   VS (24h):   Vitals:    03/28/23 0837   BP: 117/86   Pulse: 97   Temp: 98.5 °F (36.9 °C)         Physical Exam  Vitals reviewed.   Constitutional:       General: He is not in acute distress.     Appearance: He is normal weight. He is not ill-appearing, toxic-appearing or diaphoretic.   HENT:      Head: Normocephalic and atraumatic.      Right Ear: External ear normal.      Left Ear: External ear normal.      Nose: Nose normal.      Mouth/Throat:      Mouth: Mucous membranes are moist.      Pharynx: Oropharynx is clear. No oropharyngeal exudate or posterior oropharyngeal erythema.      Comments: No obvious dental caries or gingival lesions  Eyes:      General: No scleral icterus.        Right eye: No discharge.         Left eye: No discharge.      Extraocular Movements: Extraocular movements intact.      Conjunctiva/sclera: Conjunctivae normal.   Cardiovascular:      Rate and Rhythm: Normal rate and regular rhythm.      Heart sounds: Normal heart sounds.   Pulmonary:      Effort: Pulmonary effort is normal. No respiratory  distress.      Breath sounds: Normal breath sounds. No wheezing, rhonchi or rales.   Abdominal:      General: Abdomen is flat. There is no distension.      Palpations: Abdomen is soft.      Tenderness: There is abdominal tenderness (mild epigastric tenderness).   Musculoskeletal:         General: No swelling or deformity. Normal range of motion.      Cervical back: Normal range of motion. No rigidity.   Lymphadenopathy:      Cervical: No cervical adenopathy.   Skin:     General: Skin is warm and dry.      Findings: No rash.   Neurological:      Mental Status: He is alert and oriented to person, place, and time. Mental status is at baseline.      Cranial Nerves: No cranial nerve deficit.      Motor: No weakness.      Gait: Gait normal.   Psychiatric:         Mood and Affect: Mood normal.         Behavior: Behavior normal.         Thought Content: Thought content normal.         Judgment: Judgment normal.         Labs:  reviewed    Micro:   reviewed    Radiology:   reviewed    Immunization History   Administered Date(s) Administered    COVID-19, MRNA, LN-S, PF (MODERNA FULL 0.5 ML DOSE) 10/08/2021    Hepatitis B (recombinant) Adjuvanted, 2 dose 06/16/2022, 09/20/2022    Meningococcal Conjugate (MCV4O) 06/16/2022    Pneumococcal Conjugate - 20 Valent 06/16/2022    Tdap 08/17/2019         Assessment & Plan:     1. HIV infection, unspecified symptom status  - CBC Auto Differential; Standing  - Comprehensive Metabolic Panel; Standing  - HIV RNA, Quantitative, PCR; Standing    HIV well-controlled  -continue biktarvy    2. Mouth pain  - Ambulatory referral/consult to Dentistry; Future    Advised patient to set up appointment with dentist for further examination.    3. Night sweats  - QUANTIFERON GOLD TB; Future    Quantiferon previously negative, but will check again.    4. Hematemesis without nausea  - Ambulatory referral/consult to Gastroenterology; Future    Emesis with small streaks of blood; possibly brandy-hitchcock  tears from wretching, GERD.  Hemodynamically stable currently without s/s of symptoamtic anemia and Hgb 15.4.  -GI referral to eval for possible EGD, other therapies    5. Dyspepsia  - H. pylori antigen, stool; Future    Will check H. Pylori stool Ag for epigastric discomfort, tarry stools, GERD         Follow up in 3 months      Fritz Taylor MD  Infectious Disease Fellow

## 2023-03-29 LAB
GAMMA INTERFERON BACKGROUND BLD IA-ACNC: 0.15 IU/ML
HIV1 RNA # SERPL NAA+PROBE: NOT DETECTED COPIES/ML
HIV1 RNA SERPL QL NAA+PROBE: NOT DETECTED
M TB IFN-G CD4+ BCKGRND COR BLD-ACNC: 0.19 IU/ML
MITOGEN IGNF BCKGRD COR BLD-ACNC: 9.85 IU/ML
TB GOLD PLUS: NEGATIVE
TB2 - NIL: 0.02 IU/ML

## 2023-04-03 ENCOUNTER — PATIENT MESSAGE (OUTPATIENT)
Dept: INFECTIOUS DISEASES | Facility: CLINIC | Age: 23
End: 2023-04-03
Payer: MEDICAID

## 2023-04-05 ENCOUNTER — PATIENT MESSAGE (OUTPATIENT)
Dept: HEMATOLOGY/ONCOLOGY | Facility: CLINIC | Age: 23
End: 2023-04-05
Payer: MEDICAID

## 2023-04-09 DIAGNOSIS — D75.1 POLYCYTHEMIA: Primary | ICD-10-CM

## 2023-04-11 ENCOUNTER — HOSPITAL ENCOUNTER (OUTPATIENT)
Dept: TRANSFUSION MEDICINE | Facility: HOSPITAL | Age: 23
Discharge: HOME | End: 2023-04-11
Payer: MEDICAID

## 2023-04-11 DIAGNOSIS — D75.1 POLYCYTHEMIA: ICD-10-CM

## 2023-05-07 ENCOUNTER — PATIENT MESSAGE (OUTPATIENT)
Dept: INFECTIOUS DISEASES | Facility: CLINIC | Age: 23
End: 2023-05-07
Payer: MEDICAID

## 2023-05-11 DIAGNOSIS — R19.7 DIARRHEA, UNSPECIFIED TYPE: Primary | ICD-10-CM

## 2023-05-11 DIAGNOSIS — R10.13 DYSPEPSIA: ICD-10-CM

## 2023-05-11 NOTE — PROGRESS NOTES
Called patient to discuss his nausea, vomiting.  Now about every other day, some decreased appetite.  Blood streaks are rare now.    Did have one episode of blood-tinged sputum on coughing.  Quantiferon negative previously.  Otherwise feels well.    Also now reporting diarrhea for ~1-2 weeks.  No unique foods or antibiotics recently.    Discussed that patient will be re-referred to another GI specialist location.    Ordered GI pathogens panel, c diff, giardia/crypto.  H pylori stool ag not yet collected, will reorder.

## 2023-05-20 ENCOUNTER — PATIENT MESSAGE (OUTPATIENT)
Dept: INFECTIOUS DISEASES | Facility: CLINIC | Age: 23
End: 2023-05-20

## 2023-06-14 ENCOUNTER — PATIENT MESSAGE (OUTPATIENT)
Dept: HEMATOLOGY/ONCOLOGY | Facility: CLINIC | Age: 23
End: 2023-06-14
Payer: MEDICAID

## 2023-06-14 ENCOUNTER — HOSPITAL ENCOUNTER (OUTPATIENT)
Dept: TRANSFUSION MEDICINE | Facility: HOSPITAL | Age: 23
Discharge: HOME | End: 2023-06-14
Attending: INTERNAL MEDICINE
Payer: MEDICAID

## 2023-06-14 DIAGNOSIS — D75.1 POLYCYTHEMIA: ICD-10-CM

## 2023-06-14 PROCEDURE — 99195 PHLEBOTOMY: CPT

## 2023-06-27 NOTE — PROGRESS NOTES
Pt to Blood Bank for therapeutic phlebotomy.  /76  P 92  T 97.6  Hct 47.  1 unit WB removed from  L arm.  Tolerated well.  Written/verbal instructions given.  Pressure wrap applied when hemostasis achieved.  Refreshments accepted.  Ambulatory to and from BB per self.

## 2023-08-30 ENCOUNTER — HOSPITAL ENCOUNTER (OUTPATIENT)
Dept: TRANSFUSION MEDICINE | Facility: HOSPITAL | Age: 23
Discharge: HOME | End: 2023-08-30
Attending: INTERNAL MEDICINE
Payer: MEDICAID

## 2023-08-30 ENCOUNTER — PATIENT MESSAGE (OUTPATIENT)
Dept: HEMATOLOGY/ONCOLOGY | Facility: CLINIC | Age: 23
End: 2023-08-30
Payer: MEDICAID

## 2023-08-30 PROCEDURE — 99195 PHLEBOTOMY: CPT

## 2023-09-05 DIAGNOSIS — K92.1 BLOOD IN STOOL: Primary | ICD-10-CM

## 2023-09-12 ENCOUNTER — OFFICE VISIT (OUTPATIENT)
Dept: SURGERY | Facility: CLINIC | Age: 23
End: 2023-09-12
Payer: MEDICAID

## 2023-09-12 ENCOUNTER — TELEPHONE (OUTPATIENT)
Dept: ENDOSCOPY | Facility: HOSPITAL | Age: 23
End: 2023-09-12
Payer: MEDICAID

## 2023-09-12 VITALS
SYSTOLIC BLOOD PRESSURE: 116 MMHG | DIASTOLIC BLOOD PRESSURE: 71 MMHG | BODY MASS INDEX: 22.59 KG/M2 | HEART RATE: 88 BPM | WEIGHT: 157.44 LBS

## 2023-09-12 DIAGNOSIS — R19.7 DIARRHEA, UNSPECIFIED TYPE: ICD-10-CM

## 2023-09-12 DIAGNOSIS — R10.9 ABDOMINAL PAIN, UNSPECIFIED ABDOMINAL LOCATION: ICD-10-CM

## 2023-09-12 DIAGNOSIS — K21.9 GASTROESOPHAGEAL REFLUX DISEASE, UNSPECIFIED WHETHER ESOPHAGITIS PRESENT: ICD-10-CM

## 2023-09-12 DIAGNOSIS — K62.5 RECTAL BLEEDING: ICD-10-CM

## 2023-09-12 DIAGNOSIS — Z12.11 SCREENING FOR COLON CANCER: Primary | ICD-10-CM

## 2023-09-12 DIAGNOSIS — K92.0 HEMATEMESIS, UNSPECIFIED WHETHER NAUSEA PRESENT: ICD-10-CM

## 2023-09-12 DIAGNOSIS — K92.0 HEMATEMESIS WITH NAUSEA: ICD-10-CM

## 2023-09-12 DIAGNOSIS — R11.2 NAUSEA AND VOMITING, UNSPECIFIED VOMITING TYPE: Primary | ICD-10-CM

## 2023-09-12 DIAGNOSIS — R19.7 BLOODY DIARRHEA: ICD-10-CM

## 2023-09-12 DIAGNOSIS — R10.9 ABDOMINAL PAIN, UNSPECIFIED ABDOMINAL LOCATION: Primary | ICD-10-CM

## 2023-09-12 PROCEDURE — 99213 OFFICE O/P EST LOW 20 MIN: CPT | Mod: PBBFAC | Performed by: NURSE PRACTITIONER

## 2023-09-12 PROCEDURE — 99999 PR PBB SHADOW E&M-EST. PATIENT-LVL III: ICD-10-PCS | Mod: PBBFAC,,, | Performed by: NURSE PRACTITIONER

## 2023-09-12 PROCEDURE — 3074F PR MOST RECENT SYSTOLIC BLOOD PRESSURE < 130 MM HG: ICD-10-PCS | Mod: CPTII,,, | Performed by: NURSE PRACTITIONER

## 2023-09-12 PROCEDURE — 99214 OFFICE O/P EST MOD 30 MIN: CPT | Mod: S$PBB,,, | Performed by: NURSE PRACTITIONER

## 2023-09-12 PROCEDURE — 1159F PR MEDICATION LIST DOCUMENTED IN MEDICAL RECORD: ICD-10-PCS | Mod: CPTII,,, | Performed by: NURSE PRACTITIONER

## 2023-09-12 PROCEDURE — 1159F MED LIST DOCD IN RCRD: CPT | Mod: CPTII,,, | Performed by: NURSE PRACTITIONER

## 2023-09-12 PROCEDURE — 3008F PR BODY MASS INDEX (BMI) DOCUMENTED: ICD-10-PCS | Mod: CPTII,,, | Performed by: NURSE PRACTITIONER

## 2023-09-12 PROCEDURE — 3074F SYST BP LT 130 MM HG: CPT | Mod: CPTII,,, | Performed by: NURSE PRACTITIONER

## 2023-09-12 PROCEDURE — 3078F PR MOST RECENT DIASTOLIC BLOOD PRESSURE < 80 MM HG: ICD-10-PCS | Mod: CPTII,,, | Performed by: NURSE PRACTITIONER

## 2023-09-12 PROCEDURE — 99214 PR OFFICE/OUTPT VISIT, EST, LEVL IV, 30-39 MIN: ICD-10-PCS | Mod: S$PBB,,, | Performed by: NURSE PRACTITIONER

## 2023-09-12 PROCEDURE — 3008F BODY MASS INDEX DOCD: CPT | Mod: CPTII,,, | Performed by: NURSE PRACTITIONER

## 2023-09-12 PROCEDURE — 1160F PR REVIEW ALL MEDS BY PRESCRIBER/CLIN PHARMACIST DOCUMENTED: ICD-10-PCS | Mod: CPTII,,, | Performed by: NURSE PRACTITIONER

## 2023-09-12 PROCEDURE — 1160F RVW MEDS BY RX/DR IN RCRD: CPT | Mod: CPTII,,, | Performed by: NURSE PRACTITIONER

## 2023-09-12 PROCEDURE — 99999 PR PBB SHADOW E&M-EST. PATIENT-LVL III: CPT | Mod: PBBFAC,,, | Performed by: NURSE PRACTITIONER

## 2023-09-12 PROCEDURE — 3078F DIAST BP <80 MM HG: CPT | Mod: CPTII,,, | Performed by: NURSE PRACTITIONER

## 2023-09-12 RX ORDER — SOD SULF/POT CHLORIDE/MAG SULF 1.479 G
12 TABLET ORAL DAILY
Qty: 24 TABLET | Refills: 0 | Status: SHIPPED | OUTPATIENT
Start: 2023-09-12 | End: 2023-09-18

## 2023-09-12 RX ORDER — OMEPRAZOLE 20 MG/1
20 CAPSULE, DELAYED RELEASE ORAL DAILY
Qty: 30 CAPSULE | Refills: 2 | Status: SHIPPED | OUTPATIENT
Start: 2023-09-12 | End: 2023-09-18

## 2023-09-12 RX ORDER — ONDANSETRON 4 MG/1
4 TABLET, ORALLY DISINTEGRATING ORAL EVERY 6 HOURS PRN
Qty: 30 TABLET | Refills: 2 | Status: ON HOLD | OUTPATIENT
Start: 2023-09-12 | End: 2023-10-06 | Stop reason: CLARIF

## 2023-09-12 NOTE — PATIENT INSTRUCTIONS
[facility] GI department [phone]  Schedule EGD/Colonoscopy. [phone] if you have any questions/need to reschedule after today.  Collect stool and [facility] lab that is [facility] to you.   Once you have collected the stool, then you can start the omeprazole daily.   30 mins before first protein of the day.

## 2023-09-12 NOTE — TELEPHONE ENCOUNTER
"----- Message from Jaymie Felix NP sent at 2023 10:29 AM CDT -----  Procedure: EGD/Colonoscopy    Diagnosis: Rectal bleeding, Diarrhea, Abdominal pain, GERD, Hematemesis, and Nausea/Vomiting    Procedure Timin-4 weeks    #If within 4 weeks selected, please kai as high priority#    #If greater than 12 weeks, please select "5-12 weeks" and delay sending until 2 months prior to requested date#     Provider: Any GI provider    Location: No Preference    Additional Scheduling Information: No scheduling concerns    Prep Specifications:Standard prep    Is the patient taking a GLP-1 Agonist:no    Have you attached a patient to this message: yes      "

## 2023-09-12 NOTE — TELEPHONE ENCOUNTER
Spoke to pt to schedule procedure(s) Colonoscopy/EGD       Physician to perform procedure(s) Dr. TRISHA Benson  Date of Procedure (s) 11/01/23  Arrival Time 12:15 PM  Time of Procedure(s) 1:15 PM   Location of Procedure(s) [facility]  Type of Rx Prep sent to patient: Sutab  Instructions provided to patient via [employer]/Handed to pt in clinic    Patient was informed on the following information and verbalized understanding. Screening questionnaire reviewed with patient and complete. If procedure requires anesthesia, a responsible adult needs to be present to accompany the patient home, patient cannot drive after receiving anesthesia. Appointment details are tentative, especially check-in time. Patient will receive a prep-op call 4 days prior to confirm check-in time for procedure. If applicable the patient should contact their pharmacy to verify Rx for procedure prep is ready for pick-up. Patient was advised to call the scheduling department at [phone] if pharmacy states no Rx is available. Patient was advised to call the endoscopy scheduling department if any questions or concerns arise.      [facility] Scheduling Department

## 2023-09-14 NOTE — PROGRESS NOTES
Pt presented to donor room ambulatory for a therapeutic phlebotomy.  Vital signs; b/p 113/75, pulse 106, temp 99.7, hematocrit 44% and weight 140 lbs.  490 mls of whole blood was drawn from right ac vein.  Pressure dressing applied after hemostasis was achieved.  Tolerated well.  Pt exited dept ambulatory by himself.

## 2023-09-17 ENCOUNTER — HOSPITAL ENCOUNTER (INPATIENT)
Facility: HOSPITAL | Age: 23
LOS: 8 days | Discharge: HOME | DRG: 368 | End: 2023-09-25
Attending: EMERGENCY MEDICINE | Admitting: INTERNAL MEDICINE
Payer: MEDICAID

## 2023-09-17 DIAGNOSIS — R94.31 PROLONGED Q-T INTERVAL ON ECG: ICD-10-CM

## 2023-09-17 DIAGNOSIS — K85.91 ACUTE NECROTIZING PANCREATITIS: Primary | ICD-10-CM

## 2023-09-17 DIAGNOSIS — B20 HIV INFECTION, UNSPECIFIED SYMPTOM STATUS: ICD-10-CM

## 2023-09-17 DIAGNOSIS — R19.7 DIARRHEA, UNSPECIFIED TYPE: ICD-10-CM

## 2023-09-17 DIAGNOSIS — K92.0 HEMATEMESIS WITH NAUSEA: ICD-10-CM

## 2023-09-17 DIAGNOSIS — R06.02 SOB (SHORTNESS OF BREATH): ICD-10-CM

## 2023-09-17 LAB
ABO + RH BLD: NORMAL
ALBUMIN SERPL BCP-MCNC: 3.4 G/DL (ref 3.5–5.2)
ALP SERPL-CCNC: 101 U/L (ref 55–135)
ALT SERPL W/O P-5'-P-CCNC: 44 U/L (ref 10–44)
ANION GAP SERPL CALC-SCNC: 16 MMOL/L (ref 8–16)
AST SERPL-CCNC: 63 U/L (ref 10–40)
BASOPHILS # BLD AUTO: 0.08 K/UL (ref 0–0.2)
BASOPHILS NFR BLD: 1 % (ref 0–1.9)
BILIRUB SERPL-MCNC: 0.3 MG/DL (ref 0.1–1)
BLD GP AB SCN CELLS X3 SERPL QL: NORMAL
BUN SERPL-MCNC: 9 MG/DL (ref 6–20)
CALCIUM SERPL-MCNC: 8.5 MG/DL (ref 8.7–10.5)
CHLORIDE SERPL-SCNC: 103 MMOL/L (ref 95–110)
CO2 SERPL-SCNC: 20 MMOL/L (ref 23–29)
CREAT SERPL-MCNC: 0.7 MG/DL (ref 0.5–1.4)
DIFFERENTIAL METHOD: ABNORMAL
EOSINOPHIL # BLD AUTO: 0.2 K/UL (ref 0–0.5)
EOSINOPHIL NFR BLD: 2.6 % (ref 0–8)
ERYTHROCYTE [DISTWIDTH] IN BLOOD BY AUTOMATED COUNT: 18.6 % (ref 11.5–14.5)
EST. GFR  (NO RACE VARIABLE): >60 ML/MIN/1.73 M^2
GLUCOSE SERPL-MCNC: 84 MG/DL (ref 70–110)
HCT VFR BLD AUTO: 37.9 % (ref 40–54)
HCV AB SERPL QL IA: REACTIVE
HGB BLD-MCNC: 12.2 G/DL (ref 14–18)
IMM GRANULOCYTES # BLD AUTO: 0.01 K/UL (ref 0–0.04)
IMM GRANULOCYTES NFR BLD AUTO: 0.1 % (ref 0–0.5)
INR PPP: 1.1 (ref 0.8–1.2)
LIPASE SERPL-CCNC: 12 U/L (ref 4–60)
LYMPHOCYTES # BLD AUTO: 2.4 K/UL (ref 1–4.8)
LYMPHOCYTES NFR BLD: 30.9 % (ref 18–48)
MCH RBC QN AUTO: 29 PG (ref 27–31)
MCHC RBC AUTO-ENTMCNC: 32.2 G/DL (ref 32–36)
MCV RBC AUTO: 90 FL (ref 82–98)
MONOCYTES # BLD AUTO: 0.6 K/UL (ref 0.3–1)
MONOCYTES NFR BLD: 7.2 % (ref 4–15)
NEUTROPHILS # BLD AUTO: 4.5 K/UL (ref 1.8–7.7)
NEUTROPHILS NFR BLD: 58.2 % (ref 38–73)
NRBC BLD-RTO: 0 /100 WBC
PLATELET # BLD AUTO: 290 K/UL (ref 150–450)
PMV BLD AUTO: 10.3 FL (ref 9.2–12.9)
POTASSIUM SERPL-SCNC: 4 MMOL/L (ref 3.5–5.1)
PROT SERPL-MCNC: 6.7 G/DL (ref 6–8.4)
PROTHROMBIN TIME: 11.9 SEC (ref 9–12.5)
RBC # BLD AUTO: 4.21 M/UL (ref 4.6–6.2)
SODIUM SERPL-SCNC: 139 MMOL/L (ref 136–145)
SPECIMEN OUTDATE: NORMAL
WBC # BLD AUTO: 7.74 K/UL (ref 3.9–12.7)

## 2023-09-17 PROCEDURE — 80053 COMPREHEN METABOLIC PANEL: CPT

## 2023-09-17 PROCEDURE — G0378 HOSPITAL OBSERVATION PER HR: HCPCS

## 2023-09-17 PROCEDURE — 82272 OCCULT BLD FECES 1-3 TESTS: CPT

## 2023-09-17 PROCEDURE — 85610 PROTHROMBIN TIME: CPT

## 2023-09-17 PROCEDURE — 99223 1ST HOSP IP/OBS HIGH 75: CPT | Mod: ,,, | Performed by: NURSE PRACTITIONER

## 2023-09-17 PROCEDURE — C9113 INJ PANTOPRAZOLE SODIUM, VIA: HCPCS

## 2023-09-17 PROCEDURE — 86901 BLOOD TYPING SEROLOGIC RH(D): CPT

## 2023-09-17 PROCEDURE — 85025 COMPLETE CBC W/AUTO DIFF WBC: CPT

## 2023-09-17 PROCEDURE — 96375 TX/PRO/DX INJ NEW DRUG ADDON: CPT

## 2023-09-17 PROCEDURE — 99223 PR INITIAL HOSPITAL CARE,LEVL III: ICD-10-PCS | Mod: ,,, | Performed by: NURSE PRACTITIONER

## 2023-09-17 PROCEDURE — 63600175 PHARM REV CODE 636 W HCPCS

## 2023-09-17 PROCEDURE — 96374 THER/PROPH/DIAG INJ IV PUSH: CPT

## 2023-09-17 PROCEDURE — 86803 HEPATITIS C AB TEST: CPT | Performed by: PHYSICIAN ASSISTANT

## 2023-09-17 PROCEDURE — 12000002 HC ACUTE/MED SURGE SEMI-PRIVATE ROOM

## 2023-09-17 PROCEDURE — 83690 ASSAY OF LIPASE: CPT

## 2023-09-17 RX ORDER — PANTOPRAZOLE SODIUM 40 MG/10ML
80 INJECTION, POWDER, LYOPHILIZED, FOR SOLUTION INTRAVENOUS
Status: COMPLETED | OUTPATIENT
Start: 2023-09-17 | End: 2023-09-17

## 2023-09-17 RX ORDER — SODIUM CHLORIDE 9 MG/ML
INJECTION, SOLUTION INTRAVENOUS CONTINUOUS
Status: ACTIVE | OUTPATIENT
Start: 2023-09-18 | End: 2023-09-18

## 2023-09-17 RX ORDER — ONDANSETRON 2 MG/ML
4 INJECTION INTRAMUSCULAR; INTRAVENOUS
Status: COMPLETED | OUTPATIENT
Start: 2023-09-17 | End: 2023-09-17

## 2023-09-17 RX ORDER — ACETAMINOPHEN 325 MG/1
650 TABLET ORAL EVERY 6 HOURS PRN
Status: DISCONTINUED | OUTPATIENT
Start: 2023-09-18 | End: 2023-09-25 | Stop reason: HOSPADM

## 2023-09-17 RX ORDER — PANTOPRAZOLE SODIUM 40 MG/10ML
40 INJECTION, POWDER, LYOPHILIZED, FOR SOLUTION INTRAVENOUS 2 TIMES DAILY
Status: DISCONTINUED | OUTPATIENT
Start: 2023-09-18 | End: 2023-09-18

## 2023-09-17 RX ORDER — ONDANSETRON 2 MG/ML
4 INJECTION INTRAMUSCULAR; INTRAVENOUS EVERY 8 HOURS PRN
Status: DISCONTINUED | OUTPATIENT
Start: 2023-09-18 | End: 2023-09-18

## 2023-09-17 RX ORDER — ALBUTEROL SULFATE 90 UG/1
2 AEROSOL, METERED RESPIRATORY (INHALATION) EVERY 6 HOURS PRN
Status: DISCONTINUED | OUTPATIENT
Start: 2023-09-18 | End: 2023-09-25 | Stop reason: HOSPADM

## 2023-09-17 RX ORDER — MORPHINE SULFATE 2 MG/ML
2 INJECTION, SOLUTION INTRAMUSCULAR; INTRAVENOUS EVERY 4 HOURS PRN
Status: DISCONTINUED | OUTPATIENT
Start: 2023-09-18 | End: 2023-09-18

## 2023-09-17 RX ORDER — ACETAMINOPHEN 500 MG
1000 TABLET ORAL EVERY 8 HOURS PRN
Status: DISCONTINUED | OUTPATIENT
Start: 2023-09-18 | End: 2023-09-24

## 2023-09-17 RX ORDER — MORPHINE SULFATE 4 MG/ML
4 INJECTION, SOLUTION INTRAMUSCULAR; INTRAVENOUS
Status: COMPLETED | OUTPATIENT
Start: 2023-09-17 | End: 2023-09-17

## 2023-09-17 RX ADMIN — MORPHINE SULFATE 4 MG: 4 INJECTION INTRAVENOUS at 09:09

## 2023-09-17 RX ADMIN — PANTOPRAZOLE SODIUM 80 MG: 40 INJECTION, POWDER, FOR SOLUTION INTRAVENOUS at 09:09

## 2023-09-17 RX ADMIN — ONDANSETRON 4 MG: 2 INJECTION INTRAMUSCULAR; INTRAVENOUS at 08:09

## 2023-09-18 ENCOUNTER — ANESTHESIA EVENT (OUTPATIENT)
Dept: ENDOSCOPY | Facility: HOSPITAL | Age: 23
DRG: 368 | End: 2023-09-18
Payer: MEDICAID

## 2023-09-18 ENCOUNTER — ANESTHESIA (OUTPATIENT)
Dept: ENDOSCOPY | Facility: HOSPITAL | Age: 23
DRG: 368 | End: 2023-09-18
Payer: MEDICAID

## 2023-09-18 PROBLEM — K22.6 MALLORY-WEISS TEAR: Status: ACTIVE | Noted: 2023-09-18

## 2023-09-18 PROBLEM — R19.7 BLOODY DIARRHEA: Status: ACTIVE | Noted: 2023-09-18

## 2023-09-18 PROBLEM — J45.20 MILD INTERMITTENT ASTHMA WITHOUT COMPLICATION: Status: ACTIVE | Noted: 2023-09-18

## 2023-09-18 PROBLEM — R68.84 JAW PAIN: Status: ACTIVE | Noted: 2023-09-18

## 2023-09-18 PROBLEM — D64.9 ANEMIA: Status: ACTIVE | Noted: 2023-09-18

## 2023-09-18 PROBLEM — R76.8 HEPATITIS C ANTIBODY POSITIVE IN BLOOD: Status: ACTIVE | Noted: 2023-09-18

## 2023-09-18 LAB
ALBUMIN SERPL BCP-MCNC: 2.9 G/DL (ref 3.5–5.2)
ALP SERPL-CCNC: 84 U/L (ref 55–135)
ALT SERPL W/O P-5'-P-CCNC: 35 U/L (ref 10–44)
AMPHET+METHAMPHET UR QL: NEGATIVE
ANION GAP SERPL CALC-SCNC: 13 MMOL/L (ref 8–16)
AST SERPL-CCNC: 42 U/L (ref 10–40)
BARBITURATES UR QL SCN>200 NG/ML: NEGATIVE
BASOPHILS # BLD AUTO: 0.03 K/UL (ref 0–0.2)
BASOPHILS # BLD AUTO: 0.04 K/UL (ref 0–0.2)
BASOPHILS # BLD AUTO: 0.04 K/UL (ref 0–0.2)
BASOPHILS # BLD AUTO: 0.05 K/UL (ref 0–0.2)
BASOPHILS NFR BLD: 0.3 % (ref 0–1.9)
BASOPHILS NFR BLD: 0.5 % (ref 0–1.9)
BASOPHILS NFR BLD: 0.6 % (ref 0–1.9)
BASOPHILS NFR BLD: 0.7 % (ref 0–1.9)
BENZODIAZ UR QL SCN>200 NG/ML: NEGATIVE
BILIRUB SERPL-MCNC: 0.5 MG/DL (ref 0.1–1)
BILIRUB UR QL STRIP: NEGATIVE
BUN SERPL-MCNC: 8 MG/DL (ref 6–20)
BZE UR QL SCN: NEGATIVE
CALCIUM SERPL-MCNC: 7.9 MG/DL (ref 8.7–10.5)
CANNABINOIDS UR QL SCN: ABNORMAL
CHLORIDE SERPL-SCNC: 106 MMOL/L (ref 95–110)
CLARITY UR REFRACT.AUTO: CLEAR
CO2 SERPL-SCNC: 20 MMOL/L (ref 23–29)
COLOR UR AUTO: YELLOW
CREAT SERPL-MCNC: 0.7 MG/DL (ref 0.5–1.4)
CREAT UR-MCNC: 243 MG/DL (ref 23–375)
CRP SERPL-MCNC: <0.3 MG/L (ref 0–8.2)
DIFFERENTIAL METHOD: ABNORMAL
EOSINOPHIL # BLD AUTO: 0.1 K/UL (ref 0–0.5)
EOSINOPHIL # BLD AUTO: 0.2 K/UL (ref 0–0.5)
EOSINOPHIL # BLD AUTO: 0.2 K/UL (ref 0–0.5)
EOSINOPHIL # BLD AUTO: 0.3 K/UL (ref 0–0.5)
EOSINOPHIL NFR BLD: 1.6 % (ref 0–8)
EOSINOPHIL NFR BLD: 2.4 % (ref 0–8)
EOSINOPHIL NFR BLD: 3.5 % (ref 0–8)
EOSINOPHIL NFR BLD: 3.9 % (ref 0–8)
ERYTHROCYTE [DISTWIDTH] IN BLOOD BY AUTOMATED COUNT: 19 % (ref 11.5–14.5)
ERYTHROCYTE [DISTWIDTH] IN BLOOD BY AUTOMATED COUNT: 19.4 % (ref 11.5–14.5)
ERYTHROCYTE [SEDIMENTATION RATE] IN BLOOD BY PHOTOMETRIC METHOD: <2 MM/HR (ref 0–23)
EST. GFR  (NO RACE VARIABLE): >60 ML/MIN/1.73 M^2
ETHANOL UR-MCNC: <10 MG/DL
FERRITIN SERPL-MCNC: 32 NG/ML (ref 20–300)
FOLATE SERPL-MCNC: 2.4 NG/ML (ref 4–24)
GLUCOSE SERPL-MCNC: 85 MG/DL (ref 70–110)
GLUCOSE UR QL STRIP: NEGATIVE
HCT VFR BLD AUTO: 34.9 % (ref 40–54)
HCT VFR BLD AUTO: 35.9 % (ref 40–54)
HCT VFR BLD AUTO: 38.7 % (ref 40–54)
HCT VFR BLD AUTO: 40.3 % (ref 40–54)
HCV RNA SERPL QL NAA+PROBE: NOT DETECTED
HCV RNA SPEC NAA+PROBE-ACNC: NOT DETECTED IU/ML
HGB BLD-MCNC: 11.1 G/DL (ref 14–18)
HGB BLD-MCNC: 11.4 G/DL (ref 14–18)
HGB BLD-MCNC: 12.4 G/DL (ref 14–18)
HGB BLD-MCNC: 12.7 G/DL (ref 14–18)
HGB UR QL STRIP: NEGATIVE
IMM GRANULOCYTES # BLD AUTO: 0.01 K/UL (ref 0–0.04)
IMM GRANULOCYTES # BLD AUTO: 0.03 K/UL (ref 0–0.04)
IMM GRANULOCYTES NFR BLD AUTO: 0.1 % (ref 0–0.5)
IMM GRANULOCYTES NFR BLD AUTO: 0.1 % (ref 0–0.5)
IMM GRANULOCYTES NFR BLD AUTO: 0.2 % (ref 0–0.5)
IMM GRANULOCYTES NFR BLD AUTO: 0.3 % (ref 0–0.5)
IRON SERPL-MCNC: 47 UG/DL (ref 45–160)
KETONES UR QL STRIP: NEGATIVE
LEUKOCYTE ESTERASE UR QL STRIP: NEGATIVE
LYMPHOCYTES # BLD AUTO: 1.6 K/UL (ref 1–4.8)
LYMPHOCYTES # BLD AUTO: 2.1 K/UL (ref 1–4.8)
LYMPHOCYTES # BLD AUTO: 2.6 K/UL (ref 1–4.8)
LYMPHOCYTES # BLD AUTO: 3.3 K/UL (ref 1–4.8)
LYMPHOCYTES NFR BLD: 17.4 % (ref 18–48)
LYMPHOCYTES NFR BLD: 27.5 % (ref 18–48)
LYMPHOCYTES NFR BLD: 41.6 % (ref 18–48)
LYMPHOCYTES NFR BLD: 42.7 % (ref 18–48)
MCH RBC QN AUTO: 29.3 PG (ref 27–31)
MCH RBC QN AUTO: 29.5 PG (ref 27–31)
MCH RBC QN AUTO: 29.6 PG (ref 27–31)
MCH RBC QN AUTO: 30.1 PG (ref 27–31)
MCHC RBC AUTO-ENTMCNC: 31.5 G/DL (ref 32–36)
MCHC RBC AUTO-ENTMCNC: 31.8 G/DL (ref 32–36)
MCHC RBC AUTO-ENTMCNC: 31.8 G/DL (ref 32–36)
MCHC RBC AUTO-ENTMCNC: 32 G/DL (ref 32–36)
MCV RBC AUTO: 92 FL (ref 82–98)
MCV RBC AUTO: 93 FL (ref 82–98)
MCV RBC AUTO: 94 FL (ref 82–98)
MCV RBC AUTO: 94 FL (ref 82–98)
METHADONE UR QL SCN>300 NG/ML: NEGATIVE
MONOCYTES # BLD AUTO: 0.6 K/UL (ref 0.3–1)
MONOCYTES # BLD AUTO: 0.7 K/UL (ref 0.3–1)
MONOCYTES NFR BLD: 10.4 % (ref 4–15)
MONOCYTES NFR BLD: 8.3 % (ref 4–15)
MONOCYTES NFR BLD: 8.6 % (ref 4–15)
MONOCYTES NFR BLD: 9.5 % (ref 4–15)
NEUTROPHILS # BLD AUTO: 2.8 K/UL (ref 1.8–7.7)
NEUTROPHILS # BLD AUTO: 3.3 K/UL (ref 1.8–7.7)
NEUTROPHILS # BLD AUTO: 4.5 K/UL (ref 1.8–7.7)
NEUTROPHILS # BLD AUTO: 6.5 K/UL (ref 1.8–7.7)
NEUTROPHILS NFR BLD: 43.1 % (ref 38–73)
NEUTROPHILS NFR BLD: 43.7 % (ref 38–73)
NEUTROPHILS NFR BLD: 60.9 % (ref 38–73)
NEUTROPHILS NFR BLD: 72.1 % (ref 38–73)
NITRITE UR QL STRIP: NEGATIVE
NRBC BLD-RTO: 0 /100 WBC
OPIATES UR QL SCN: ABNORMAL
PCP UR QL SCN>25 NG/ML: NEGATIVE
PH UR STRIP: 6 [PH] (ref 5–8)
PLATELET # BLD AUTO: 243 K/UL (ref 150–450)
PLATELET # BLD AUTO: 245 K/UL (ref 150–450)
PLATELET # BLD AUTO: 259 K/UL (ref 150–450)
PLATELET # BLD AUTO: 262 K/UL (ref 150–450)
PMV BLD AUTO: 10 FL (ref 9.2–12.9)
PMV BLD AUTO: 10.5 FL (ref 9.2–12.9)
PMV BLD AUTO: 9.7 FL (ref 9.2–12.9)
PMV BLD AUTO: 9.9 FL (ref 9.2–12.9)
POTASSIUM SERPL-SCNC: 3.2 MMOL/L (ref 3.5–5.1)
PROT SERPL-MCNC: 5.4 G/DL (ref 6–8.4)
PROT UR QL STRIP: NEGATIVE
RBC # BLD AUTO: 3.79 M/UL (ref 4.6–6.2)
RBC # BLD AUTO: 3.86 M/UL (ref 4.6–6.2)
RBC # BLD AUTO: 4.12 M/UL (ref 4.6–6.2)
RBC # BLD AUTO: 4.29 M/UL (ref 4.6–6.2)
SATURATED IRON: 15 % (ref 20–50)
SODIUM SERPL-SCNC: 139 MMOL/L (ref 136–145)
SP GR UR STRIP: 1.02 (ref 1–1.03)
TOTAL IRON BINDING CAPACITY: 315 UG/DL (ref 250–450)
TOXICOLOGY INFORMATION: ABNORMAL
TRANSFERRIN SERPL-MCNC: 213 MG/DL (ref 200–375)
URN SPEC COLLECT METH UR: NORMAL
VIT B12 SERPL-MCNC: 247 PG/ML (ref 210–950)
WBC # BLD AUTO: 6.32 K/UL (ref 3.9–12.7)
WBC # BLD AUTO: 7.45 K/UL (ref 3.9–12.7)
WBC # BLD AUTO: 7.66 K/UL (ref 3.9–12.7)
WBC # BLD AUTO: 8.96 K/UL (ref 3.9–12.7)

## 2023-09-18 PROCEDURE — 25000003 PHARM REV CODE 250

## 2023-09-18 PROCEDURE — D9220A PRA ANESTHESIA: ICD-10-PCS | Mod: CRNA,,, | Performed by: NURSE ANESTHETIST, CERTIFIED REGISTERED

## 2023-09-18 PROCEDURE — 88342 IMHCHEM/IMCYTCHM 1ST ANTB: CPT | Performed by: PATHOLOGY

## 2023-09-18 PROCEDURE — 63600175 PHARM REV CODE 636 W HCPCS: Performed by: NURSE PRACTITIONER

## 2023-09-18 PROCEDURE — 80053 COMPREHEN METABOLIC PANEL: CPT | Performed by: NURSE PRACTITIONER

## 2023-09-18 PROCEDURE — 99204 OFFICE O/P NEW MOD 45 MIN: CPT | Mod: 25,,, | Performed by: INTERNAL MEDICINE

## 2023-09-18 PROCEDURE — 37000009 HC ANESTHESIA EA ADD 15 MINS: Performed by: INTERNAL MEDICINE

## 2023-09-18 PROCEDURE — 87522 HEPATITIS C REVRS TRNSCRPJ: CPT | Mod: 91

## 2023-09-18 PROCEDURE — 88305 TISSUE EXAM BY PATHOLOGIST: CPT | Performed by: PATHOLOGY

## 2023-09-18 PROCEDURE — D9220A PRA ANESTHESIA: Mod: ANES,,, | Performed by: ANESTHESIOLOGY

## 2023-09-18 PROCEDURE — C9113 INJ PANTOPRAZOLE SODIUM, VIA: HCPCS | Performed by: NURSE PRACTITIONER

## 2023-09-18 PROCEDURE — G0378 HOSPITAL OBSERVATION PER HR: HCPCS

## 2023-09-18 PROCEDURE — 99204 PR OFFICE/OUTPT VISIT, NEW, LEVL IV, 45-59 MIN: ICD-10-PCS | Mod: 25,,, | Performed by: INTERNAL MEDICINE

## 2023-09-18 PROCEDURE — 25000003 PHARM REV CODE 250: Performed by: NURSE PRACTITIONER

## 2023-09-18 PROCEDURE — 86644 CMV ANTIBODY: CPT | Performed by: NURSE PRACTITIONER

## 2023-09-18 PROCEDURE — 94761 N-INVAS EAR/PLS OXIMETRY MLT: CPT

## 2023-09-18 PROCEDURE — D9220A PRA ANESTHESIA: ICD-10-PCS | Mod: ANES,,, | Performed by: ANESTHESIOLOGY

## 2023-09-18 PROCEDURE — 37000008 HC ANESTHESIA 1ST 15 MINUTES: Performed by: INTERNAL MEDICINE

## 2023-09-18 PROCEDURE — 88342 CHG IMMUNOCYTOCHEMISTRY: ICD-10-PCS | Mod: 26,,, | Performed by: PATHOLOGY

## 2023-09-18 PROCEDURE — 85652 RBC SED RATE AUTOMATED: CPT | Performed by: NURSE PRACTITIONER

## 2023-09-18 PROCEDURE — 63600175 PHARM REV CODE 636 W HCPCS

## 2023-09-18 PROCEDURE — 99233 PR SUBSEQUENT HOSPITAL CARE,LEVL III: ICD-10-PCS | Mod: ,,,

## 2023-09-18 PROCEDURE — D9220A PRA ANESTHESIA: Mod: CRNA,,, | Performed by: NURSE ANESTHETIST, CERTIFIED REGISTERED

## 2023-09-18 PROCEDURE — 82746 ASSAY OF FOLIC ACID SERUM: CPT | Performed by: NURSE PRACTITIONER

## 2023-09-18 PROCEDURE — 88342 IMHCHEM/IMCYTCHM 1ST ANTB: CPT | Mod: 26,,, | Performed by: PATHOLOGY

## 2023-09-18 PROCEDURE — C9113 INJ PANTOPRAZOLE SODIUM, VIA: HCPCS

## 2023-09-18 PROCEDURE — 25000003 PHARM REV CODE 250: Performed by: NURSE ANESTHETIST, CERTIFIED REGISTERED

## 2023-09-18 PROCEDURE — 85025 COMPLETE CBC W/AUTO DIFF WBC: CPT | Mod: 91 | Performed by: NURSE PRACTITIONER

## 2023-09-18 PROCEDURE — 99233 SBSQ HOSP IP/OBS HIGH 50: CPT | Mod: ,,,

## 2023-09-18 PROCEDURE — 82728 ASSAY OF FERRITIN: CPT | Performed by: NURSE PRACTITIONER

## 2023-09-18 PROCEDURE — 43239 EGD BIOPSY SINGLE/MULTIPLE: CPT | Performed by: INTERNAL MEDICINE

## 2023-09-18 PROCEDURE — 82607 VITAMIN B-12: CPT | Performed by: NURSE PRACTITIONER

## 2023-09-18 PROCEDURE — 63600175 PHARM REV CODE 636 W HCPCS: Performed by: INTERNAL MEDICINE

## 2023-09-18 PROCEDURE — 88305 TISSUE EXAM BY PATHOLOGIST: ICD-10-PCS | Mod: 26,,, | Performed by: PATHOLOGY

## 2023-09-18 PROCEDURE — 99285 EMERGENCY DEPT VISIT HI MDM: CPT | Mod: 25

## 2023-09-18 PROCEDURE — 43239 PR EGD, FLEX, W/BIOPSY, SGL/MULTI: ICD-10-PCS | Mod: ,,, | Performed by: INTERNAL MEDICINE

## 2023-09-18 PROCEDURE — 43239 EGD BIOPSY SINGLE/MULTIPLE: CPT | Mod: ,,, | Performed by: INTERNAL MEDICINE

## 2023-09-18 PROCEDURE — 36415 COLL VENOUS BLD VENIPUNCTURE: CPT | Performed by: NURSE PRACTITIONER

## 2023-09-18 PROCEDURE — 80307 DRUG TEST PRSMV CHEM ANLYZR: CPT | Performed by: NURSE PRACTITIONER

## 2023-09-18 PROCEDURE — 21400001 HC TELEMETRY ROOM

## 2023-09-18 PROCEDURE — 84466 ASSAY OF TRANSFERRIN: CPT | Performed by: NURSE PRACTITIONER

## 2023-09-18 PROCEDURE — 27201012 HC FORCEPS, HOT/COLD, DISP: Performed by: INTERNAL MEDICINE

## 2023-09-18 PROCEDURE — 86140 C-REACTIVE PROTEIN: CPT | Performed by: NURSE PRACTITIONER

## 2023-09-18 PROCEDURE — 81003 URINALYSIS AUTO W/O SCOPE: CPT | Performed by: NURSE PRACTITIONER

## 2023-09-18 PROCEDURE — 87522 HEPATITIS C REVRS TRNSCRPJ: CPT

## 2023-09-18 PROCEDURE — 96376 TX/PRO/DX INJ SAME DRUG ADON: CPT

## 2023-09-18 PROCEDURE — 83540 ASSAY OF IRON: CPT | Performed by: NURSE PRACTITIONER

## 2023-09-18 PROCEDURE — 88305 TISSUE EXAM BY PATHOLOGIST: CPT | Mod: 26,,, | Performed by: PATHOLOGY

## 2023-09-18 PROCEDURE — 63600175 PHARM REV CODE 636 W HCPCS: Performed by: NURSE ANESTHETIST, CERTIFIED REGISTERED

## 2023-09-18 RX ORDER — HYDROCODONE BITARTRATE AND ACETAMINOPHEN 10; 325 MG/1; MG/1
1 TABLET ORAL EVERY 6 HOURS PRN
Status: DISCONTINUED | OUTPATIENT
Start: 2023-09-18 | End: 2023-09-19

## 2023-09-18 RX ORDER — MORPHINE SULFATE 4 MG/ML
1 INJECTION, SOLUTION INTRAMUSCULAR; INTRAVENOUS EVERY 6 HOURS PRN
Status: DISCONTINUED | OUTPATIENT
Start: 2023-09-18 | End: 2023-09-19

## 2023-09-18 RX ORDER — PANTOPRAZOLE SODIUM 40 MG/1
40 TABLET, DELAYED RELEASE ORAL
Status: DISCONTINUED | OUTPATIENT
Start: 2023-09-19 | End: 2023-09-19

## 2023-09-18 RX ORDER — ONDANSETRON 2 MG/ML
4 INJECTION INTRAMUSCULAR; INTRAVENOUS EVERY 6 HOURS PRN
Status: DISCONTINUED | OUTPATIENT
Start: 2023-09-18 | End: 2023-09-25 | Stop reason: HOSPADM

## 2023-09-18 RX ORDER — PROCHLORPERAZINE EDISYLATE 5 MG/ML
5 INJECTION INTRAMUSCULAR; INTRAVENOUS EVERY 8 HOURS
Status: DISCONTINUED | OUTPATIENT
Start: 2023-09-18 | End: 2023-09-19

## 2023-09-18 RX ORDER — PROCHLORPERAZINE EDISYLATE 5 MG/ML
5 INJECTION INTRAMUSCULAR; INTRAVENOUS EVERY 6 HOURS PRN
Status: DISCONTINUED | OUTPATIENT
Start: 2023-09-18 | End: 2023-09-19

## 2023-09-18 RX ORDER — MIDAZOLAM HYDROCHLORIDE 1 MG/ML
INJECTION, SOLUTION INTRAMUSCULAR; INTRAVENOUS
Status: DISCONTINUED | OUTPATIENT
Start: 2023-09-18 | End: 2023-09-18

## 2023-09-18 RX ORDER — PROPOFOL 10 MG/ML
VIAL (ML) INTRAVENOUS
Status: DISCONTINUED | OUTPATIENT
Start: 2023-09-18 | End: 2023-09-18

## 2023-09-18 RX ORDER — PANTOPRAZOLE SODIUM 40 MG/10ML
40 INJECTION, POWDER, LYOPHILIZED, FOR SOLUTION INTRAVENOUS 2 TIMES DAILY
Status: COMPLETED | OUTPATIENT
Start: 2023-09-18 | End: 2023-09-18

## 2023-09-18 RX ORDER — DICYCLOMINE HYDROCHLORIDE 10 MG/1
10 CAPSULE ORAL EVERY 6 HOURS PRN
Status: DISCONTINUED | OUTPATIENT
Start: 2023-09-18 | End: 2023-09-18

## 2023-09-18 RX ORDER — FENTANYL CITRATE 50 UG/ML
INJECTION, SOLUTION INTRAMUSCULAR; INTRAVENOUS
Status: DISCONTINUED | OUTPATIENT
Start: 2023-09-18 | End: 2023-09-18

## 2023-09-18 RX ORDER — FOLIC ACID 1 MG/1
1 TABLET ORAL DAILY
Status: DISCONTINUED | OUTPATIENT
Start: 2023-09-18 | End: 2023-09-25 | Stop reason: HOSPADM

## 2023-09-18 RX ORDER — MORPHINE SULFATE 2 MG/ML
1 INJECTION, SOLUTION INTRAMUSCULAR; INTRAVENOUS EVERY 6 HOURS PRN
Status: DISCONTINUED | OUTPATIENT
Start: 2023-09-18 | End: 2023-09-18

## 2023-09-18 RX ORDER — HYDROCODONE BITARTRATE AND ACETAMINOPHEN 5; 325 MG/1; MG/1
1 TABLET ORAL EVERY 4 HOURS PRN
Status: DISCONTINUED | OUTPATIENT
Start: 2023-09-18 | End: 2023-09-19

## 2023-09-18 RX ORDER — DEXMEDETOMIDINE HYDROCHLORIDE 100 UG/ML
INJECTION, SOLUTION INTRAVENOUS
Status: DISCONTINUED | OUTPATIENT
Start: 2023-09-18 | End: 2023-09-18

## 2023-09-18 RX ORDER — HYDROCODONE BITARTRATE AND ACETAMINOPHEN 5; 325 MG/1; MG/1
1 TABLET ORAL ONCE
Status: DISCONTINUED | OUTPATIENT
Start: 2023-09-18 | End: 2023-09-18

## 2023-09-18 RX ORDER — LIDOCAINE HYDROCHLORIDE 20 MG/ML
INJECTION INTRAVENOUS
Status: DISCONTINUED | OUTPATIENT
Start: 2023-09-18 | End: 2023-09-18

## 2023-09-18 RX ORDER — DICYCLOMINE HYDROCHLORIDE 10 MG/ML
10 INJECTION INTRAMUSCULAR ONCE
Status: DISCONTINUED | OUTPATIENT
Start: 2023-09-18 | End: 2023-09-18

## 2023-09-18 RX ORDER — ONDANSETRON 2 MG/ML
4 INJECTION INTRAMUSCULAR; INTRAVENOUS DAILY PRN
Status: DISCONTINUED | OUTPATIENT
Start: 2023-09-18 | End: 2023-09-18

## 2023-09-18 RX ORDER — FENTANYL CITRATE 50 UG/ML
25 INJECTION, SOLUTION INTRAMUSCULAR; INTRAVENOUS EVERY 5 MIN PRN
Status: DISCONTINUED | OUTPATIENT
Start: 2023-09-18 | End: 2023-09-18

## 2023-09-18 RX ADMIN — PROCHLORPERAZINE EDISYLATE 5 MG: 5 INJECTION INTRAMUSCULAR; INTRAVENOUS at 01:09

## 2023-09-18 RX ADMIN — SODIUM CHLORIDE: 9 INJECTION, SOLUTION INTRAVENOUS at 12:09

## 2023-09-18 RX ADMIN — ONDANSETRON 4 MG: 2 INJECTION INTRAMUSCULAR; INTRAVENOUS at 02:09

## 2023-09-18 RX ADMIN — PANTOPRAZOLE SODIUM 40 MG: 40 INJECTION, POWDER, FOR SOLUTION INTRAVENOUS at 08:09

## 2023-09-18 RX ADMIN — MORPHINE SULFATE 2 MG: 2 INJECTION, SOLUTION INTRAMUSCULAR; INTRAVENOUS at 12:09

## 2023-09-18 RX ADMIN — HYDROCODONE BITARTRATE AND ACETAMINOPHEN 1 TABLET: 10; 325 TABLET ORAL at 11:09

## 2023-09-18 RX ADMIN — PROPOFOL 50 MG: 10 INJECTION, EMULSION INTRAVENOUS at 12:09

## 2023-09-18 RX ADMIN — PROCHLORPERAZINE EDISYLATE 5 MG: 5 INJECTION INTRAMUSCULAR; INTRAVENOUS at 09:09

## 2023-09-18 RX ADMIN — FOLIC ACID 1 MG: 1 TABLET ORAL at 08:09

## 2023-09-18 RX ADMIN — MIDAZOLAM HYDROCHLORIDE 2 MG: 1 INJECTION, SOLUTION INTRAMUSCULAR; INTRAVENOUS at 11:09

## 2023-09-18 RX ADMIN — FENTANYL CITRATE 100 MCG: 50 INJECTION, SOLUTION INTRAMUSCULAR; INTRAVENOUS at 11:09

## 2023-09-18 RX ADMIN — MORPHINE SULFATE 1 MG: 4 INJECTION INTRAVENOUS at 09:09

## 2023-09-18 RX ADMIN — BICTEGRAVIR SODIUM, EMTRICITABINE, AND TENOFOVIR ALAFENAMIDE FUMARATE 1 TABLET: 50; 200; 25 TABLET ORAL at 09:09

## 2023-09-18 RX ADMIN — PANTOPRAZOLE SODIUM 40 MG: 40 INJECTION, POWDER, FOR SOLUTION INTRAVENOUS at 09:09

## 2023-09-18 RX ADMIN — ONDANSETRON 4 MG: 2 INJECTION INTRAMUSCULAR; INTRAVENOUS at 09:09

## 2023-09-18 RX ADMIN — LIDOCAINE HYDROCHLORIDE 100 MG: 20 INJECTION INTRAVENOUS at 12:09

## 2023-09-18 RX ADMIN — DEXMEDETOMIDINE 8 MCG: 100 INJECTION, SOLUTION, CONCENTRATE INTRAVENOUS at 12:09

## 2023-09-18 RX ADMIN — SODIUM CHLORIDE: 0.9 INJECTION, SOLUTION INTRAVENOUS at 11:09

## 2023-09-18 RX ADMIN — PROPOFOL 200 MG: 10 INJECTION, EMULSION INTRAVENOUS at 11:09

## 2023-09-18 RX ADMIN — MORPHINE SULFATE 2 MG: 2 INJECTION, SOLUTION INTRAMUSCULAR; INTRAVENOUS at 02:09

## 2023-09-18 RX ADMIN — HYDROCODONE BITARTRATE AND ACETAMINOPHEN 1 TABLET: 10; 325 TABLET ORAL at 04:09

## 2023-09-18 RX ADMIN — ONDANSETRON 4 MG: 2 INJECTION INTRAMUSCULAR; INTRAVENOUS at 11:09

## 2023-09-18 RX ADMIN — MORPHINE SULFATE 2 MG: 2 INJECTION, SOLUTION INTRAMUSCULAR; INTRAVENOUS at 08:09

## 2023-09-18 NOTE — ANESTHESIA PREPROCEDURE EVALUATION
09/18/2023  Tasia Cardona is a 23 y.o., male.  Pre-operative evaluation for Procedure(s) (LRB):  EGD (ESOPHAGOGASTRODUODENOSCOPY) (N/A)    Tasia Cardona is a 23 y.o. male       Patient Active Problem List   Diagnosis    HIV infection    Acute conjunctivitis of both eyes    Polycythemia    Syncope    Nausea & vomiting    Hematemesis    Anemia    Jaw pain    Mild intermittent asthma without complication       History reviewed. No pertinent surgical history.    Social History     Socioeconomic History    Marital status: Single   Tobacco Use    Smoking status: Some Days     Types: Cigarettes    Smokeless tobacco: Never   Substance and Sexual Activity    Alcohol use: Yes    Drug use: Never       No current facility-administered medications on file prior to encounter.     Current Outpatient Medications on File Prior to Encounter   Medication Sig Dispense Refill    xswqmampl-zvcscmwn-dujeggp ala (BIKTARVY) -25 mg (25 kg or greater) Take 1 tablet by mouth once daily. 30 tablet 5    ibuprofen (ADVIL,MOTRIN) 400 MG tablet Take 400 mg by mouth every 4 (four) hours as needed (Inflammation).      ondansetron (ZOFRAN-ODT) 4 MG TbDL Take 1 tablet (4 mg total) by mouth every 6 (six) hours as needed (nausea). (Patient not taking: Reported on 9/18/2023) 30 tablet 2       Review of patient's allergies indicates:   Allergen Reactions    [place] Swelling    Pcn [penicillins] Hives    Pecan nut Swelling    Soy     Sulfa (sulfonamide antibiotics) Hives         CBC:   Recent Labs     09/18/23  0356 09/18/23  0839   WBC 7.66 6.32   RBC 3.79* 3.86*   HGB 11.1* 11.4*   HCT 34.9* 35.9*    243   MCV 92 93   MCH 29.3 29.5   MCHC 31.8* 31.8*       CMP:   Recent Labs     09/17/23  2036 09/18/23  0356    139   K 4.0 3.2*    106   CO2 20* 20*   BUN 9 8   CREATININE 0.7 0.7   GLU 84 85   CALCIUM  8.5* 7.9*   ALBUMIN 3.4* 2.9*   PROT 6.7 5.4*   ALKPHOS 101 84   ALT 44 35   AST 63* 42*   BILITOT 0.3 0.5       INR  Recent Labs     09/17/23 2036   INR 1.1         Diagnostic Studies:    EKG:   Results for orders placed or performed in visit on 09/18/23   EKG 12-lead    Collection Time: 09/18/23  8:18 AM    Narrative    Test Reason :     Vent. Rate : 054 BPM     Atrial Rate : 054 BPM     P-R Int : 142 ms          QRS Dur : 102 ms      QT Int : 472 ms       P-R-T Axes : 043 070 042 degrees     QTc Int : 447 ms    Sinus bradycardia with marked sinus arrythmia and PACs  Otherwise normal ECG  When compared with ECG of 17-DEC-2022 05:38,  Vent. rate has decreased BY  31 BPM  Questionable change in The axis  Confirmed by Salbador DYER MD ([id number]) on 9/18/2023 9:35:50 AM    Referred By: [email or web address]   SELF           Confirmed By:Salbador DYER MD       TTE:  Results for orders placed or performed during the hospital encounter of 12/16/22   Echo   Result Value Ref Range    Ascending aorta 2.90 cm    STJ 2.63 cm    AV mean gradient 5 mmHg    Ao peak ronak 1.35 m/s    Ao VTI 22.29 cm    IVRT 117.03 msec    IVS 0.74 0.6 - 1.1 cm    LA size 3.34 cm    Left Atrium Major Axis 5.33 cm    Left Atrium Minor Axis 4.79 cm    LVIDd 5.59 3.5 - 6.0 cm    LVIDs 3.39 2.1 - 4.0 cm    LVOT diameter 2.59 cm    LVOT peak VTI 16.16 cm    Posterior Wall 0.54 (A) 0.6 - 1.1 cm    MV Peak A Ronak 0.69 m/s    E wave deceleration time 138.35 msec    MV Peak E Ronak 0.69 m/s    RA Major Axis 3.56 cm    RA Width 3.31 cm    RVDD 2.92 cm    Sinus 3.39 cm    TAPSE 2.57 cm    TDI LATERAL 0.17 m/s    TDI SEPTAL 0.13 m/s    LA WIDTH 3.54 cm    MV stenosis pressure 1/2 time 40.12 ms    LV Diastolic Volume 153.30 mL    LV Systolic Volume 46.94 mL    RV S' 20.33 cm/s    LVOT peak ronak 0.88 m/s    LA volume (mod) 42.77 cm3    LV LATERAL E/E' RATIO 4.06 m/s    LV SEPTAL E/E' RATIO 5.31 m/s    FS 39 %    LA volume 50.71 cm3    LV mass 125.04 g    Left Ventricle Relative Wall  "Thickness 0.19 cm    AV valve area 3.82 cm2    AV Velocity Ratio 0.65     AV index (prosthetic) 0.72     MV valve area p 1/2 method 5.48 cm2    E/A ratio 1.00     Mean e' 0.15 m/s    LVOT area 5.3 cm2    LVOT stroke volume 85.10 cm3    AV peak gradient 7 mmHg    E/E' ratio 4.60 m/s    LV Systolic Volume Index 26.2 mL/m2    LV Diastolic Volume Index 85.64 mL/m2    LA Volume Index 28.3 mL/m2    LV Mass Index 70 g/m2    LA Volume Index (Mod) 23.9 mL/m2    BSA 1.77 m2    Right Atrial Pressure (from IVC) 8 mmHg    EF 55 %    Narrative    · The left ventricle is normal in size with normal systolic function.  · The estimated ejection fraction is 55%.  · Normal left ventricular diastolic function.  · Normal right ventricular size with normal right ventricular systolic   function.  · Intermediate central venous pressure (8 mmHg).        EF   Date Value Ref Range Status   12/17/2022 55 % Final      No results found for this or any previous visit.    RAMSES:  No results found for this or any previous visit.    Stress Test:  No results found for this or any previous visit.       LHC:  No results found for this or any previous visit.       PFT:  No results found for: "FEV1", "FVC", "DOG5HII", "TLC", "DLCO"     ALLERGIES:     Review of patient's allergies indicates:   Allergen Reactions    [place] Swelling    Pcn [penicillins] Hives    Pecan nut Swelling    Soy     Sulfa (sulfonamide antibiotics) Hives     LDA:          Lines/Drains/Airways     Peripheral Intravenous Line  Duration                Peripheral IV - Single Lumen 09/17/23 2035 20 G Left;Posterior Hand <1 day               Anesthesia Evaluation      Airway   Mallampati: II  TM distance: > 6 cm  Neck ROM: Normal ROM  Dental    (+) Intact    Pulmonary    (+) asthma,   Cardiovascular     Rate: Normal    Neuro/Psych      GI/Hepatic/Renal      Endo/Other    Abdominal                         Pre-op Assessment    I have reviewed the Patient Summary Reports.     I have " reviewed the Nursing Notes. I have reviewed the NPO Status.   I have reviewed the Medications.     Review of Systems  Anesthesia Hx:  No problems with previous Anesthesia  Denies Family Hx of Anesthesia complications.   Denies Personal Hx of Anesthesia complications.   Cardiovascular:  Cardiovascular Normal     Pulmonary:   Asthma    Renal/:  Renal/ Normal     Neurological:  Neurology Normal    Endocrine:  Endocrine Normal        Physical Exam  General: Well nourished    Airway:  Mallampati: II   Mouth Opening: Normal  TM Distance: > 6 cm  Tongue: Normal  Neck ROM: Normal ROM    Dental:  Intact    Chest/Lungs:  Normal Respiratory Rate    Heart:  Rate: Normal        Anesthesia Plan  Type of Anesthesia, risks & benefits discussed:    Anesthesia Type: Gen ETT  Intra-op Monitoring Plan: Standard ASA Monitors  Post Op Pain Control Plan: multimodal analgesia and IV/PO Opioids PRN  Induction:  IV and rapid sequence  Airway Plan: Direct, Post-Induction  Informed Consent: Informed consent signed with the Patient and all parties understand the risks and agree with anesthesia plan.  All questions answered. Patient consented to blood products? Yes  ASA Score: 1 Emergent  Day of Surgery Review of History & Physical: H&P Update referred to the surgeon/provider.    Ready For Surgery From Anesthesia Perspective.     .

## 2023-09-18 NOTE — SUBJECTIVE & OBJECTIVE
Interval History: NAEON. AFVSS. Patient reporting abdominal pain this morning but improved nausea. Denies lightheadedness or palpitations. GI performed EGD today, source of bleed identified as brandy-hitchcock tear. Schedule antiemetics to prevent recurrent emesis. Monitor cbc.     Review of Systems   Constitutional:  Positive for chills. Negative for fatigue, fever and unexpected weight change.   HENT:  Positive for sore throat. Negative for drooling, mouth sores and sinus pressure.         +Left sided jaw pain   Eyes:  Negative for photophobia and visual disturbance.   Respiratory:  Negative for cough, shortness of breath and wheezing.    Cardiovascular:  Negative for chest pain, palpitations and leg swelling.   Gastrointestinal:  Positive for abdominal pain, diarrhea and nausea. Negative for abdominal distention, blood in stool and vomiting.   Genitourinary:  Negative for dysuria, frequency and hematuria.   Musculoskeletal:  Negative for back pain, gait problem and myalgias.   Skin:  Negative for color change, rash and wound.   Neurological:  Positive for weakness (generalized). Negative for dizziness, syncope and light-headedness.   Psychiatric/Behavioral:  Negative for confusion and hallucinations. The patient is not nervous/anxious.      Objective:     Vital Signs (Most Recent):  Temp: 97.5 °F (36.4 °C) (09/18/23 1239)  Pulse: (!) 51 (09/18/23 1330)  Resp: 16 (09/18/23 1330)  BP: 110/69 (09/18/23 1315)  SpO2: 98 % (09/18/23 1330) Vital Signs (24h Range):  Temp:  [97.5 °F (36.4 °C)-99.7 °F (37.6 °C)] 97.5 °F (36.4 °C)  Pulse:  [51-92] 51  Resp:  [12-21] 16  SpO2:  [96 %-99 %] 98 %  BP: (110-137)/(65-86) 110/69     Weight: 69.9 kg (154 lb)  Body mass index is 22.1 kg/m².    Intake/Output Summary (Last 24 hours) at 9/18/2023 1335  Last data filed at 9/18/2023 1214  Gross per 24 hour   Intake 500 ml   Output 325 ml   Net 175 ml         Physical Exam  Constitutional:       General: He is not in acute distress.      Appearance: Normal appearance. He is not toxic-appearing or diaphoretic.   HENT:      Head: Normocephalic and atraumatic.      Jaw: Tenderness present.      Comments: Left side TTP     Nose: Nose normal.      Mouth/Throat:      Mouth: Mucous membranes are moist.      Pharynx: Oropharynx is clear.   Eyes:      Extraocular Movements: Extraocular movements intact.      Pupils: Pupils are equal, round, and reactive to light.   Neck:      Trachea: Trachea normal.   Cardiovascular:      Rate and Rhythm: Normal rate and regular rhythm.      Pulses: Normal pulses.   Pulmonary:      Effort: Pulmonary effort is normal. No respiratory distress.      Breath sounds: Normal breath sounds. No wheezing, rhonchi or rales.   Abdominal:      General: Abdomen is flat. Bowel sounds are increased. There is no distension.      Palpations: Abdomen is soft.      Tenderness: There is abdominal tenderness in the right upper quadrant and epigastric area. There is no guarding.   Musculoskeletal:         General: Normal range of motion.      Cervical back: Normal range of motion.      Right lower leg: No edema.      Left lower leg: No edema.   Skin:     General: Skin is warm and dry.      Capillary Refill: Capillary refill takes less than 2 seconds.   Neurological:      General: No focal deficit present.      Mental Status: He is alert and oriented to person, place, and time.      Cranial Nerves: Cranial nerves 2-12 are intact.      Sensory: Sensation is intact.      Motor: Motor function is intact. No weakness.   Psychiatric:         Mood and Affect: Mood and affect normal.         Speech: Speech normal.         Behavior: Behavior normal. Behavior is cooperative.         Cognition and Memory: Cognition normal.         Judgment: Judgment normal.             Significant Labs: All pertinent labs within the past 24 hours have been reviewed.  CBC:   Recent Labs   Lab 09/17/23  2036 09/18/23  0356 09/18/23  0839   WBC 7.74 7.66 6.32   HGB 12.2* 11.1*  11.4*   HCT 37.9* 34.9* 35.9*    262 243     CMP:   Recent Labs   Lab 09/17/23 2036 09/18/23  0356    139   K 4.0 3.2*    106   CO2 20* 20*   GLU 84 85   BUN 9 8   CREATININE 0.7 0.7   CALCIUM 8.5* 7.9*   PROT 6.7 5.4*   ALBUMIN 3.4* 2.9*   BILITOT 0.3 0.5   ALKPHOS 101 84   AST 63* 42*   ALT 44 35   ANIONGAP 16 13       Significant Imaging: I have reviewed all pertinent imaging results/findings within the past 24 hours.

## 2023-09-18 NOTE — ED NOTES
Pt notified of need for urine sample, states will provide sample when he gets urge to go, JADE Morocho notified

## 2023-09-18 NOTE — ANESTHESIA PROCEDURE NOTES
Intubation    Date/Time: 9/18/2023 11:57 AM    Performed by: Lovely Veliz CRNA  Authorized by: Leighann Gallo MD    Intubation:     Induction:  Rapid sequence induction    Intubated:  Postinduction    Mask Ventilation:  Not attempted    Attempts:  1    Attempted By:  CRNA    Method of Intubation:  Video laryngoscopy    Blade:  Gomez 3    Laryngeal View Grade: Grade I - full view of cords      Difficult Airway Encountered?: No      Complications:  None    Airway Device:  Oral endotracheal tube    Airway Device Size:  7.5    Style/Cuff Inflation:  Cuffed (inflated to minimal occlusive pressure)    Inflation Amount (mL):  5    Tube secured:  21    Secured at:  The lips    Placement Verified By:  Capnometry    Complicating Factors:  None    Findings Post-Intubation:  BS equal bilateral and atraumatic/condition of teeth unchanged

## 2023-09-18 NOTE — ED NOTES
Pt in hospital gown and socks, on cardiac monitor, side rails up x2, call light in reach, white board updated, urinal at bedside. Pt encouraged to call for assistance if needed. Care on going

## 2023-09-18 NOTE — ASSESSMENT & PLAN NOTE
-Pt with history of TMJ, likely aggravated by frequent vomiting. No warmth or erythema on exam.  -CT maxillofacial pending.  -PRN tylenol.

## 2023-09-18 NOTE — ASSESSMENT & PLAN NOTE
This patient in known to have HIV+ status (Have detected HIV PCR but never CD4 <200 or AIDS defining illness). Continue to monitor routine labs. Last CD4 count was   Lab Results   Component Value Date    ABSOLUTECD4 1089 10/18/2022       We will not consult Infectious disease at this time. Other HIV-associated diseases are not present.    -Continue Home HIV medication  Stool studies pending

## 2023-09-18 NOTE — PROGRESS NOTES
"[facility]  Progress Note    Patient Name: Tasia Cardona  MRN: [id number]  Patient Class: OP- Observation   Admission Date: 9/17/2023  Length of Stay: 0 days  Attending Physician: Srini Ferreira MD  Primary Care Provider: Brittany, Primary Doctor        Subjective:     Principal Problem:Hematemesis        HPI:  Tasia Cardona is a 23 y.o. male with a PMHx of HIV, Asthma, and polycythemia vera presenting with a c/o facial pain and hematemesis. The patient states that the vomiting began 4 days ago and was initially mild but quickly progressed to hematemesis. States that he sees bright red blood sometimes clots each time he vomits. Today he is had 5-6 episodes prior to presentation in the emergency department. He feels weak, admits abdominal discomfort and significant nausea. He attempted to take ibuprofen to help with his symptoms however this did not have any benefit he reported. Patient has not been able to tolerate any oral intake for the past 2 days given the amount of nausea. Additionally patient admits significant jaw pain primarily on the left near the TMJ junction. This has began progressively with the episodes of vomiting. He also notes he has been having watery diarrhea with 1-2 episodes of dark "clot-like" bowel movements for several months. He denies any LOC, syncope, or fatigue. Pt was recently seen by colorectal surgery (9/12/23) for similar complaints and was prescribed a PPI and antiemetic medication, as well as given a GI referral for EGD/colonoscopy.    In ED VSS, afebrile. CBC with mild anemia. PT/INR WNL. Bicarb 20. Calcium 8.5. AST 63. Lipase 12. Hepatitis C Ab reactive, hepatitis C quantitative PCR in process. CXR-Cardiomediastinal silhouette is not enlarged. No focal consolidation. No sizable pleural effusion. No pneumothorax. Given 80mg protonix, 4mg morhpine, & 4mg zofran IVP.       Overview/Hospital Course:  Tasia Cardona was placed in  observation for workup of GI " bleeding. Hgb 11.1, below baseline. GI consulted and performed EGD revealing Brandy-Chauhan tear, Erythematous mucosa in the antrum (bx taken), and Congested duodenal mucosa (bx taken). Will advance diet as tolerated, antiemetics prn. Continue protonix PO BID. Collect stool studies. DC when medically stable with plan for outpatient colonoscopy and GI f/u. Path results pending.      Interval History: NAEON. AFVSS. Patient reporting abdominal pain this morning but improved nausea. Denies lightheadedness or palpitations. GI performed EGD today, source of bleed identified as brandy-chauhan tear. Schedule antiemetics to prevent recurrent emesis. Monitor cbc.     Review of Systems   Constitutional:  Positive for chills. Negative for fatigue, fever and unexpected weight change.   HENT:  Positive for sore throat. Negative for drooling, mouth sores and sinus pressure.         +Left sided jaw pain   Eyes:  Negative for photophobia and visual disturbance.   Respiratory:  Negative for cough, shortness of breath and wheezing.    Cardiovascular:  Negative for chest pain, palpitations and leg swelling.   Gastrointestinal:  Positive for abdominal pain, diarrhea and nausea. Negative for abdominal distention, blood in stool and vomiting.   Genitourinary:  Negative for dysuria, frequency and hematuria.   Musculoskeletal:  Negative for back pain, gait problem and myalgias.   Skin:  Negative for color change, rash and wound.   Neurological:  Positive for weakness (generalized). Negative for dizziness, syncope and light-headedness.   Psychiatric/Behavioral:  Negative for confusion and hallucinations. The patient is not nervous/anxious.      Objective:     Vital Signs (Most Recent):  Temp: 97.5 °F (36.4 °C) (09/18/23 1239)  Pulse: (!) 51 (09/18/23 1330)  Resp: 16 (09/18/23 1330)  BP: 110/69 (09/18/23 1315)  SpO2: 98 % (09/18/23 1330) Vital Signs (24h Range):  Temp:  [97.5 °F (36.4 °C)-99.7 °F (37.6 °C)] 97.5 °F (36.4 °C)  Pulse:  [51-92]  51  Resp:  [12-21] 16  SpO2:  [96 %-99 %] 98 %  BP: (110-137)/(65-86) 110/69     Weight: 69.9 kg (154 lb)  Body mass index is 22.1 kg/m².    Intake/Output Summary (Last 24 hours) at 9/18/2023 1335  Last data filed at 9/18/2023 1214  Gross per 24 hour   Intake 500 ml   Output 325 ml   Net 175 ml         Physical Exam  Constitutional:       General: He is not in acute distress.     Appearance: Normal appearance. He is not toxic-appearing or diaphoretic.   HENT:      Head: Normocephalic and atraumatic.      Jaw: Tenderness present.      Comments: Left side TTP     Nose: Nose normal.      Mouth/Throat:      Mouth: Mucous membranes are moist.      Pharynx: Oropharynx is clear.   Eyes:      Extraocular Movements: Extraocular movements intact.      Pupils: Pupils are equal, round, and reactive to light.   Neck:      Trachea: Trachea normal.   Cardiovascular:      Rate and Rhythm: Normal rate and regular rhythm.      Pulses: Normal pulses.   Pulmonary:      Effort: Pulmonary effort is normal. No respiratory distress.      Breath sounds: Normal breath sounds. No wheezing, rhonchi or rales.   Abdominal:      General: Abdomen is flat. Bowel sounds are increased. There is no distension.      Palpations: Abdomen is soft.      Tenderness: There is abdominal tenderness in the right upper quadrant and epigastric area. There is no guarding.   Musculoskeletal:         General: Normal range of motion.      Cervical back: Normal range of motion.      Right lower leg: No edema.      Left lower leg: No edema.   Skin:     General: Skin is warm and dry.      Capillary Refill: Capillary refill takes less than 2 seconds.   Neurological:      General: No focal deficit present.      Mental Status: He is alert and oriented to person, place, and time.      Cranial Nerves: Cranial nerves 2-12 are intact.      Sensory: Sensation is intact.      Motor: Motor function is intact. No weakness.   Psychiatric:         Mood and Affect: Mood and affect  normal.         Speech: Speech normal.         Behavior: Behavior normal. Behavior is cooperative.         Cognition and Memory: Cognition normal.         Judgment: Judgment normal.             Significant Labs: All pertinent labs within the past 24 hours have been reviewed.  CBC:   Recent Labs   Lab 09/17/23 2036 09/18/23  0356 09/18/23  0839   WBC 7.74 7.66 6.32   HGB 12.2* 11.1* 11.4*   HCT 37.9* 34.9* 35.9*    262 243     CMP:   Recent Labs   Lab 09/17/23 2036 09/18/23  0356    139   K 4.0 3.2*    106   CO2 20* 20*   GLU 84 85   BUN 9 8   CREATININE 0.7 0.7   CALCIUM 8.5* 7.9*   PROT 6.7 5.4*   ALBUMIN 3.4* 2.9*   BILITOT 0.3 0.5   ALKPHOS 101 84   AST 63* 42*   ALT 44 35   ANIONGAP 16 13       Significant Imaging: I have reviewed all pertinent imaging results/findings within the past 24 hours.      Assessment/Plan:      * Hematemesis  -Started on GIB pathway.  -Follow H/H closely. Type and screen blood and transfuse as needed.   -> on 8/30 Hgb 13.7, now 11.4  -Received 80mg IV Protonix in the ED, continue 40mg IV BID  -Consult GI, appreciate recs   -> EGD performed 9/19. See results below.   -Start CLD, advance as tolerated  -scheduled antiemetics to prevent recurrent emesis      Findings on EGD 9/19:  - Corinne-Chauhan tear.   - Erythematous mucosa in the antrum.  Biopsied.   - Congested duodenal mucosa.  Biopsied.   GI Plan:  - [facility].   - Advance diet as tolerated.   - Continue present medications.   - Use Protonix (pantoprazole) 40 mg PO BID.   - Scheduled anti-emetics to prevent retching.  - Since patient is immunocompromsied and having diarrhea, agree with stool studies (ova/parasite, Giardia/cryptosporidium, calprotectin).     - Outpatient colonoscopy.   - Await pathology results.     Bloody diarrhea  Intermittent, chronic.   Collect stool studies given immunocompromised state      Hepatitis C antibody positive in blood  - PCR pending   - CT  "abdomen with "hepatomegaly with geographic areas of hepatic steatosis and areas of relative fatty sparing."      Mild intermittent asthma without complication  -No s/s of acute exacerbation.  -Continue PRN albuterol inhaler.    Jaw pain  -Pt with history of TMJ, likely aggravated by frequent vomiting. No warmth or erythema on exam.  -CT maxillofacial reveals dental caries.  -PRN tylenol.  - referral to dentist     Anemia  Patient's anemia is currently controlled. S/p 0 units of PRBCs.   Current CBC reviewed-   Lab Results   Component Value Date    HGB 12.2 (L) 09/17/2023    HCT 37.9 (L) 09/17/2023     Monitor serial CBC and transfuse if patient becomes hemodynamically unstable, symptomatic or H/H drops below 7/21.   -Anemia panel.    Polycythemia  Chronic issue, stable. Follows with hematology outpatient.    HIV infection  This patient in known to have HIV+ status (Have detected HIV PCR but never CD4 <200 or AIDS defining illness). Continue to monitor routine labs. Last CD4 count was   Lab Results   Component Value Date    ABSOLUTECD4 1084 10/18/2022       We will not consult Infectious disease at this time. Other HIV-associated diseases are not present.    -Continue Home HIV medication  Stool studies pending      VTE Risk Mitigation (From admission, onward)         Ordered     Place sequential compression device  Until discontinued         09/17/23 2307     IP VTE LOW RISK PATIENT  Once         09/17/23 2307                Discharge Planning   CASTILLO:      Code Status: Full Code   Is the patient medically ready for discharge?:     Reason for patient still in hospital (select all that apply): Patient trending condition, Laboratory test, Treatment, Imaging and Consult recommendations                     Sunitha Morocho PA-C  Department of Hospital Medicine   [facility]    "

## 2023-09-18 NOTE — PROVATION PATIENT INSTRUCTIONS
Discharge Summary/Instructions after an Endoscopic Procedure  Patient Name: Tasia Cardona  Patient MRN: [id number]  Patient YOB: 2000 Monday, September 18, 2023  Kg Cleaning MD  Dear patient,  As a result of recent federal legislation (The Federal Cures Act), you may   receive lab or pathology results from your procedure in your [facility]   account before your physician is able to contact you. Your physician or   their representative will relay the results to you with their   recommendations at their soonest availability.  Thank you,  RESTRICTIONS:  During your procedure today, you received medications for sedation.  These   medications may affect your judgment, balance and coordination.  Therefore,   for 24 hours, you have the following restrictions:   - DO NOT drive a car, operate machinery, make legal/financial decisions,   sign important papers or drink alcohol.    ACTIVITY:  Today: no heavy lifting, straining or running due to procedural   sedation/anesthesia.  The following day: return to full activity including work.  DIET:  Eat and drink normally unless instructed otherwise.     TREATMENT FOR COMMON SIDE EFFECTS:  - Mild abdominal pain, nausea, belching, bloating or excessive gas:  rest,   eat lightly and use a heating pad.  - Sore Throat: treat with throat lozenges and/or gargle with warm salt   water.  - Because air was used during the procedure, expelling large amounts of air   from your rectum or belching is normal.  - If a bowel prep was taken, you may not have a bowel movement for 1-3 days.    This is normal.  SYMPTOMS TO WATCH FOR AND REPORT TO YOUR PHYSICIAN:  1. Abdominal pain or bloating, other than gas cramps.  2. Chest pain.  3. Back pain.  4. Signs of infection such as: chills or fever occurring within 24 hours   after the procedure.  5. Rectal bleeding, which would show as bright red, maroon, or black stools.   (A tablespoon of blood from the rectum is not serious, especially if    hemorrhoids are present.)  6. Vomiting.  7. Weakness or dizziness.  GO DIRECTLY TO THE NEAREST EMERGENCY ROOM IF YOU HAVE ANY OF THE FOLLOWING:      Difficulty breathing              Chills and/or fever over 101 F   Persistent vomiting and/or vomiting blood   Severe abdominal pain   Severe chest pain   Black, tarry stools   Bleeding- more than one tablespoon   Any other symptom or condition that you feel may need urgent attention  Your doctor recommends these additional instructions:  If any biopsies were taken, your doctors clinic will contact you in 1 to 2   weeks with any results.  - [facility].   - Advance diet as tolerated.   - Continue present medications.   - Use Protonix (pantoprazole) 40 mg PO BID.   - Scheduled anti-emetics to prevent retching.  - Since patient is immunocompromsied and having diarrhea, agree with stool   studies (ova/parasite, Giardia/cryptosporidium, calprotectin).     - Outpatient colonoscopy.   - Await pathology results.  For questions, problems or results please call your physician - Kg Cleaning MD at Work:  [phone].  [facility] [place], EMERGENCY ROOM PHONE NUMBER: [phone]  IF A COMPLICATION OR EMERGENCY SITUATION ARISES AND YOU ARE UNABLE TO REACH   YOUR PHYSICIAN - GO DIRECTLY TO THE EMERGENCY ROOM.  Kg Cleaning MD  9/18/2023 12:24:43 PM  This report has been verified and signed electronically.  Dear patient,  As a result of recent federal legislation (The Federal Cures Act), you may   receive lab or pathology results from your procedure in your [facility]   account before your physician is able to contact you. Your physician or   their representative will relay the results to you with their   recommendations at their soonest availability.  Thank you,  PROVATION

## 2023-09-18 NOTE — TREATMENT PLAN
Brief GI Treatment Plan    Findings on EGD today:  - Corinne-Chauhan tear.   - Erythematous mucosa in the antrum.  Biopsied.   - Congested duodenal mucosa.  Biopsied.     Plan:  - [facility].   - Advance diet as tolerated.   - Continue present medications.   - Use Protonix (pantoprazole) 40 mg PO BID.   - Scheduled anti-emetics to prevent retching.  - Since patient is immunocompromsied and having diarrhea, agree with stool studies (ova/parasite, Giardia/cryptosporidium, calprotectin).     - Outpatient colonoscopy.   - Await pathology results.     Tony Matos MD  PGY-V, Gastroenterology & Hepatology

## 2023-09-18 NOTE — SUBJECTIVE & OBJECTIVE
Past Medical History:   Diagnosis Date    Asthma        History reviewed. No pertinent surgical history.    Review of patient's allergies indicates:   Allergen Reactions    [place] Swelling    Pcn [penicillins] Hives    Pecan nut Swelling    Soy     Sulfa (sulfonamide antibiotics) Hives       No current facility-administered medications on file prior to encounter.     Current Outpatient Medications on File Prior to Encounter   Medication Sig    albuterol (PROVENTIL/VENTOLIN HFA) 90 mcg/actuation inhaler Inhale 1-2 puffs into the lungs every 6 (six) hours as needed for Wheezing or Shortness of Breath. Rescue    rvwwlagnm-fsbmeatu-lqiwpda ala (BIKTARVY) -25 mg (25 kg or greater) Take 1 tablet by mouth once daily.    ibuprofen (ADVIL,MOTRIN) 400 MG tablet Take 400 mg by mouth every 4 (four) hours.    nystatin (MYCOSTATIN) 100,000 unit/mL suspension SMARTSI By Mouth 4 Times Daily    omeprazole (PRILOSEC) 20 MG capsule Take 1 capsule (20 mg total) by mouth once daily.    ondansetron (ZOFRAN-ODT) 4 MG TbDL Take 1 tablet (4 mg total) by mouth every 6 (six) hours as needed (nausea).    sod sulf-pot chloride-mag sulf (SUTAB) 1.479-0.188- 0.225 gram tablet Take 12 tablets by mouth once daily. [id number] PCN: CN Group: [id number] Member ID: [id number]     Family History       Problem Relation (Age of Onset)    Breast cancer Maternal Grandmother    Cancer Mother, Brother    No Known Problems Father    Ovarian cancer Mother    Pancreatitis Mother          Tobacco Use    Smoking status: Some Days     Types: Cigarettes    Smokeless tobacco: Never   Substance and Sexual Activity    Alcohol use: Yes    Drug use: Never    Sexual activity: Not on file     Review of Systems   Constitutional:  Positive for chills. Negative for fatigue, fever and unexpected weight change.   HENT:  Positive for sore throat. Negative for drooling, mouth sores and sinus pressure.         +Left sided jaw pain   Eyes:  Negative for photophobia and  visual disturbance.   Respiratory:  Negative for cough, shortness of breath and wheezing.    Cardiovascular:  Negative for chest pain, palpitations and leg swelling.   Gastrointestinal:  Positive for abdominal pain, blood in stool, diarrhea, nausea and vomiting. Negative for abdominal distention.   Genitourinary:  Negative for dysuria, frequency and hematuria.   Musculoskeletal:  Negative for back pain, gait problem and myalgias.   Skin:  Negative for color change, rash and wound.   Neurological:  Positive for dizziness and weakness (generalized). Negative for syncope and light-headedness.   Psychiatric/Behavioral:  Negative for confusion and hallucinations. The patient is not nervous/anxious.      Objective:     Vital Signs (Most Recent):  Temp: 97.9 °F (36.6 °C) (09/17/23 2359)  Pulse: 66 (09/17/23 2359)  Resp: 17 (09/17/23 2359)  BP: 137/82 (09/17/23 2359)  SpO2: 97 % (09/17/23 2359) Vital Signs (24h Range):  Temp:  [97.9 °F (36.6 °C)-99.7 °F (37.6 °C)] 97.9 °F (36.6 °C)  Pulse:  [65-92] 66  Resp:  [16-20] 17  SpO2:  [97 %-99 %] 97 %  BP: (134-137)/(79-86) 137/82     Weight: 69.9 kg (154 lb)  Body mass index is 22.1 kg/m².     Physical Exam  Constitutional:       General: He is not in acute distress.     Appearance: Normal appearance. He is not toxic-appearing or diaphoretic.   HENT:      Head: Normocephalic and atraumatic.      Jaw: Tenderness present.      Comments: Left side TTP     Nose: Nose normal.      Mouth/Throat:      Mouth: Mucous membranes are moist.      Pharynx: Oropharynx is clear.   Eyes:      Extraocular Movements: Extraocular movements intact.      Pupils: Pupils are equal, round, and reactive to light.   Neck:      Trachea: Trachea normal.   Cardiovascular:      Rate and Rhythm: Normal rate and regular rhythm.      Pulses: Normal pulses.   Pulmonary:      Effort: Pulmonary effort is normal. No respiratory distress.      Breath sounds: Normal breath sounds. No wheezing, rhonchi or rales.    Abdominal:      General: Abdomen is flat. Bowel sounds are increased. There is no distension.      Palpations: Abdomen is soft.      Tenderness: There is abdominal tenderness in the right upper quadrant and epigastric area. There is no guarding.   Musculoskeletal:         General: Normal range of motion.      Cervical back: Normal range of motion.      Right lower leg: No edema.      Left lower leg: No edema.   Skin:     General: Skin is warm and dry.      Capillary Refill: Capillary refill takes less than 2 seconds.   Neurological:      General: No focal deficit present.      Mental Status: He is alert and oriented to person, place, and time.      Cranial Nerves: Cranial nerves 2-12 are intact.      Sensory: Sensation is intact.      Motor: Motor function is intact. No weakness.   Psychiatric:         Mood and Affect: Mood and affect normal.         Speech: Speech normal.         Behavior: Behavior normal. Behavior is cooperative.         Cognition and Memory: Cognition normal.         Judgment: Judgment normal.              CRANIAL NERVES     CN III, IV, VI   Pupils are equal, round, and reactive to light.       Significant Labs: CBC:   Recent Labs   Lab 09/17/23 2036   WBC 7.74   HGB 12.2*   HCT 37.9*        CMP:   Recent Labs   Lab 09/17/23 2036      K 4.0      CO2 20*   GLU 84   BUN 9   CREATININE 0.7   CALCIUM 8.5*   PROT 6.7   ALBUMIN 3.4*   BILITOT 0.3   ALKPHOS 101   AST 63*   ALT 44   ANIONGAP 16           Significant Imaging:

## 2023-09-18 NOTE — HPI
Mr. Cardona is a 22yo M w/ hx of HIV who was admitted to Gastroenterology for 4 days of nausea, vomiting and hematemesis. Patient reports that these symptoms happen intermittently, and have been going on for the last 3-4 months. Patient reports that he will vomit water at first, and then it will quickly progress to bloody vomiting, usually 6-7 times, before resolving spontaneously. It is BRB when the hematemesis occurs and it can usually fill up a small cup when the episode is finished. It usually happens several times a week. Patient reports intermittent NSAID use. Patient is also compliant w/ HIV medication, on biktarvy, most recent Viral load 5 mo undectable. Patient also reports ongoing watery and bloody diarrhea that has been going on for months, happening weekly, but this has resolved as of this presentation. Patient saw CRS 9/12 and was scheduled for EGD/colonoscopy in November.

## 2023-09-18 NOTE — ASSESSMENT & PLAN NOTE
Patient's anemia is currently controlled. S/p 0 units of PRBCs.   Current CBC reviewed-   Lab Results   Component Value Date    HGB 12.2 (L) 09/17/2023    HCT 37.9 (L) 09/17/2023     Monitor serial CBC and transfuse if patient becomes hemodynamically unstable, symptomatic or H/H drops below 7/21.   -Anemia panel.

## 2023-09-18 NOTE — ASSESSMENT & PLAN NOTE
Pt presented w/ acute on chronic hematemesis, usually preceeded by normal vomiting before quickly progressing to BRB that resolves spontaneously, w/ intermittent NSAID use concerning for recurrent brandy hitchcock tear vs. esophagitis. Pt w/ undetectable viral load/compliance w/ biktarvy.     - Plan for upper endoscopy to assess for source of bleed.  - Keep NPO   - Trend Hgb and transfuse for goal Hgb > 7, unless otherwise indicated  - Maintain IV access with 2 large bore IV's  - Intravascular resuscitation/support with IVF's   - Hold all NSAIDs and anticoagulants, unless contraindicated  - Bolus IV pantoprazole 80 mg followed by 40 mg BID  - Please correct any coagulopathy with platelets and FFP for goal of platelets >50K and INR <2.0  - Please notify GI team if there is significant change in patient's clinical status

## 2023-09-18 NOTE — NURSING TRANSFER
Nursing Transfer Note      9/18/2023   6:31 PM    Nurse giving handoff:ROLANDO Spencer    Reason patient is being transferred: Out of PACU    Transfer To: 1154    Transfer via stretcher    Transfer with cardiac monitoring    Transported by CHRIS Morales    Transfer Vital Signs:  Blood Pressure:139/92  Heart Rate:55  O2:99%  Temperature:97.5  Respirations:17    Telemetry: Box Number [id number]  Order for Tele Monitor? Yes    Medicines sent: N/A    Any special needs or follow-up needed: No    Patient belongings transferred with patient: Yes    Chart send with patient: Yes

## 2023-09-18 NOTE — ASSESSMENT & PLAN NOTE
-Pt with history of TMJ, likely aggravated by frequent vomiting. No warmth or erythema on exam.  -CT maxillofacial reveals dental caries.  -PRN tylenol.  - referral to dentist

## 2023-09-18 NOTE — ASSESSMENT & PLAN NOTE
-Started on GIB pathway.  -Follow H/H closely. Type and screen blood and transfuse as needed.   -> on 8/30 Hgb 13.7, now 11.4  -Received 80mg IV Protonix in the ED, continue 40mg IV BID  -Consult GI, appreciate recs   -> EGD performed 9/19. See results below.   -Start CLD, advance as tolerated  -scheduled antiemetics to prevent recurrent emesis      Findings on EGD 9/19:  - Corinne-Chauhan tear.   - Erythematous mucosa in the antrum.  Biopsied.   - Congested duodenal mucosa.  Biopsied.   GI Plan:  - [facility].   - Advance diet as tolerated.   - Continue present medications.   - Use Protonix (pantoprazole) 40 mg PO BID.   - Scheduled anti-emetics to prevent retching.  - Since patient is immunocompromsied and having diarrhea, agree with stool studies (ova/parasite, Giardia/cryptosporidium, calprotectin).     - Outpatient colonoscopy.   - Await pathology results.

## 2023-09-18 NOTE — H&P
"[facility] Dept  [facility]  History & Physical    Patient Name: Tasia Cardona  MRN: [id number]  Patient Class: OP- Observation  Admission Date: 9/17/2023  Attending Physician: Srini Ferreira MD   Primary Care Provider: Brittany, Primary Doctor         Patient information was obtained from patient, past medical records and ER records.     Subjective:     Principal Problem:Hematemesis    Chief Complaint:   Chief Complaint   Patient presents with    Facial Pain     L side of face pain and ear, vomiting for 3-4 d and blood in vomit, hx polycythemia vera and hiv    Hematemesis        HPI: Tasia Cardona is a 23 y.o. male with a PMHx of HIV, Asthma, and polycythemia vera presenting with a c/o facial pain and hematemesis. The patient states that the vomiting began 4 days ago and was initially mild but quickly progressed to hematemesis. States that he sees bright red blood sometimes clots each time he vomits. Today he is had 5-6 episodes prior to presentation in the emergency department. He feels weak, admits abdominal discomfort and significant nausea. He attempted to take ibuprofen to help with his symptoms however this did not have any benefit he reported. Patient has not been able to tolerate any oral intake for the past 2 days given the amount of nausea. Additionally patient admits significant jaw pain primarily on the left near the TMJ junction. This has began progressively with the episodes of vomiting. He also notes he has been having watery diarrhea with 1-2 episodes of dark "clot-like" bowel movements for several months. He denies any LOC, syncope, or fatigue. Pt was recently seen by colorectal surgery (9/12/23) for similar complaints and was prescribed a PPI and antiemetic medication, as well as given a GI referral for EGD/colonoscopy.    In ED VSS, afebrile. CBC with mild anemia. PT/INR WNL. Bicarb 20. Calcium 8.5. AST 63. Lipase 12. Hepatitis C Ab reactive, hepatitis C quantitative PCR in process. " CXR-Cardiomediastinal silhouette is not enlarged. No focal consolidation. No sizable pleural effusion. No pneumothorax. Given 80mg protonix, 4mg morhpine, & 4mg zofran IVP.       Past Medical History:   Diagnosis Date    Asthma        History reviewed. No pertinent surgical history.    Review of patient's allergies indicates:   Allergen Reactions    [place] Swelling    Pcn [penicillins] Hives    Pecan nut Swelling    Soy     Sulfa (sulfonamide antibiotics) Hives       No current facility-administered medications on file prior to encounter.     Current Outpatient Medications on File Prior to Encounter   Medication Sig    albuterol (PROVENTIL/VENTOLIN HFA) 90 mcg/actuation inhaler Inhale 1-2 puffs into the lungs every 6 (six) hours as needed for Wheezing or Shortness of Breath. Rescue    ebvnvaugh-swlzosqn-tiuxfkc ala (BIKTARVY) -25 mg (25 kg or greater) Take 1 tablet by mouth once daily.    ibuprofen (ADVIL,MOTRIN) 400 MG tablet Take 400 mg by mouth every 4 (four) hours.    nystatin (MYCOSTATIN) 100,000 unit/mL suspension SMARTSI By Mouth 4 Times Daily    omeprazole (PRILOSEC) 20 MG capsule Take 1 capsule (20 mg total) by mouth once daily.    ondansetron (ZOFRAN-ODT) 4 MG TbDL Take 1 tablet (4 mg total) by mouth every 6 (six) hours as needed (nausea).    sod sulf-pot chloride-mag sulf (SUTAB) 1.479-0.188- 0.225 gram tablet Take 12 tablets by mouth once daily. [id number] PCN: CN Group: [id number] Member ID: [id number]     Family History       Problem Relation (Age of Onset)    Breast cancer Maternal Grandmother    Cancer Mother, Brother    No Known Problems Father    Ovarian cancer Mother    Pancreatitis Mother          Tobacco Use    Smoking status: Some Days     Types: Cigarettes    Smokeless tobacco: Never   Substance and Sexual Activity    Alcohol use: Yes    Drug use: Never    Sexual activity: Not on file     Review of Systems   Constitutional:  Positive for chills. Negative for fatigue, fever and  unexpected weight change.   HENT:  Positive for sore throat. Negative for drooling, mouth sores and sinus pressure.         +Left sided jaw pain   Eyes:  Negative for photophobia and visual disturbance.   Respiratory:  Negative for cough, shortness of breath and wheezing.    Cardiovascular:  Negative for chest pain, palpitations and leg swelling.   Gastrointestinal:  Positive for abdominal pain, blood in stool, diarrhea, nausea and vomiting. Negative for abdominal distention.   Genitourinary:  Negative for dysuria, frequency and hematuria.   Musculoskeletal:  Negative for back pain, gait problem and myalgias.   Skin:  Negative for color change, rash and wound.   Neurological:  Positive for dizziness and weakness (generalized). Negative for syncope and light-headedness.   Psychiatric/Behavioral:  Negative for confusion and hallucinations. The patient is not nervous/anxious.      Objective:     Vital Signs (Most Recent):  Temp: 97.9 °F (36.6 °C) (09/17/23 2359)  Pulse: 66 (09/17/23 2359)  Resp: 17 (09/17/23 2359)  BP: 137/82 (09/17/23 2359)  SpO2: 97 % (09/17/23 2359) Vital Signs (24h Range):  Temp:  [97.9 °F (36.6 °C)-99.7 °F (37.6 °C)] 97.9 °F (36.6 °C)  Pulse:  [65-92] 66  Resp:  [16-20] 17  SpO2:  [97 %-99 %] 97 %  BP: (134-137)/(79-86) 137/82     Weight: 69.9 kg (154 lb)  Body mass index is 22.1 kg/m².     Physical Exam  Constitutional:       General: He is not in acute distress.     Appearance: Normal appearance. He is not toxic-appearing or diaphoretic.   HENT:      Head: Normocephalic and atraumatic.      Jaw: Tenderness present.      Comments: Left side TTP     Nose: Nose normal.      Mouth/Throat:      Mouth: Mucous membranes are moist.      Pharynx: Oropharynx is clear.   Eyes:      Extraocular Movements: Extraocular movements intact.      Pupils: Pupils are equal, round, and reactive to light.   Neck:      Trachea: Trachea normal.   Cardiovascular:      Rate and Rhythm: Normal rate and regular rhythm.       Pulses: Normal pulses.   Pulmonary:      Effort: Pulmonary effort is normal. No respiratory distress.      Breath sounds: Normal breath sounds. No wheezing, rhonchi or rales.   Abdominal:      General: Abdomen is flat. Bowel sounds are increased. There is no distension.      Palpations: Abdomen is soft.      Tenderness: There is abdominal tenderness in the right upper quadrant and epigastric area. There is no guarding.   Musculoskeletal:         General: Normal range of motion.      Cervical back: Normal range of motion.      Right lower leg: No edema.      Left lower leg: No edema.   Skin:     General: Skin is warm and dry.      Capillary Refill: Capillary refill takes less than 2 seconds.   Neurological:      General: No focal deficit present.      Mental Status: He is alert and oriented to person, place, and time.      Cranial Nerves: Cranial nerves 2-12 are intact.      Sensory: Sensation is intact.      Motor: Motor function is intact. No weakness.   Psychiatric:         Mood and Affect: Mood and affect normal.         Speech: Speech normal.         Behavior: Behavior normal. Behavior is cooperative.         Cognition and Memory: Cognition normal.         Judgment: Judgment normal.              CRANIAL NERVES     CN III, IV, VI   Pupils are equal, round, and reactive to light.       Significant Labs: CBC:   Recent Labs   Lab 09/17/23 2036   WBC 7.74   HGB 12.2*   HCT 37.9*        CMP:   Recent Labs   Lab 09/17/23 2036      K 4.0      CO2 20*   GLU 84   BUN 9   CREATININE 0.7   CALCIUM 8.5*   PROT 6.7   ALBUMIN 3.4*   BILITOT 0.3   ALKPHOS 101   AST 63*   ALT 44   ANIONGAP 16           Significant Imaging:   Assessment/Plan:     * Hematemesis  -Started on GIB pathway.  -Keep patient NPO.  -Follow H/H closely. Type and screen blood and transfuse as needed.  -Received 80mg IV Protonix in the ED, continue 40mg IV BID  -Consult GI, appreciate recs.  -Continue IVF hydration.   -Use IV  anti-emetics as needed.     Anemia  Patient's anemia is currently controlled. S/p 0 units of PRBCs.   Current CBC reviewed-   Lab Results   Component Value Date    HGB 12.2 (L) 09/17/2023    HCT 37.9 (L) 09/17/2023     Monitor serial CBC and transfuse if patient becomes hemodynamically unstable, symptomatic or H/H drops below 7/21.   -Anemia panel.    Mild intermittent asthma without complication  -No s/s of acute exacerbation.  -Continue PRN albuterol inhaler.    Jaw pain  -Pt with history of TMJ, likely aggravated by frequent vomiting. No warmth or erythema on exam.  -CT maxillofacial pending.  -PRN tylenol.    Polycythemia  Chronic issue, stable. Follows with hematology outpatient.    HIV infection  This patient in known to have HIV+ status (Have detected HIV PCR but never CD4 <200 or AIDS defining illness). Continue to monitor routine labs. Last CD4 count was   Lab Results   Component Value Date    ABSOLUTECD4 1084 10/18/2022       We will not consult Infectious disease at this time. Other HIV-associated diseases are not present.    -Continue Home HIV medication      VTE Risk Mitigation (From admission, onward)           Ordered     Place sequential compression device  Until discontinued         09/17/23 2307     IP VTE LOW RISK PATIENT  Once         09/17/23 2307                         On 09/18/2023, patient should be placed in hospital observation services under my care in collaboration with Nam Cleaning MD.      Zahra Barrow NP  Department of Hospital Medicine  [facility] - Emergency Dept

## 2023-09-18 NOTE — ASSESSMENT & PLAN NOTE
This patient in known to have HIV+ status (Have detected HIV PCR but never CD4 <200 or AIDS defining illness). Continue to monitor routine labs. Last CD4 count was   Lab Results   Component Value Date    ABSOLUTECD4 1083 10/18/2022       We will not consult Infectious disease at this time. Other HIV-associated diseases are not present.    -Continue Home HIV medication

## 2023-09-18 NOTE — SUBJECTIVE & OBJECTIVE
Past Medical History:   Diagnosis Date    Asthma        History reviewed. No pertinent surgical history.    Review of patient's allergies indicates:   Allergen Reactions    [place] Swelling    Pcn [penicillins] Hives    Pecan nut Swelling    Soy     Sulfa (sulfonamide antibiotics) Hives     Family History       Problem Relation (Age of Onset)    Breast cancer Maternal Grandmother    Cancer Mother, Brother    No Known Problems Father    Ovarian cancer Mother    Pancreatitis Mother          Tobacco Use    Smoking status: Some Days     Types: Cigarettes    Smokeless tobacco: Never   Substance and Sexual Activity    Alcohol use: Yes    Drug use: Never    Sexual activity: Not on file     Review of Systems   Constitutional:  Negative for fatigue and fever.   Respiratory:  Negative for cough, chest tightness and shortness of breath.    Cardiovascular:  Negative for chest pain, palpitations and leg swelling.   Gastrointestinal:  Positive for abdominal pain. Negative for abdominal distention, blood in stool and constipation.   Genitourinary:  Negative for dysuria and urgency.   Skin:  Negative for rash.   Neurological:  Negative for tremors and numbness.     Objective:     Vital Signs (Most Recent):  Temp: 97.9 °F (36.6 °C) (09/18/23 0841)  Pulse: (!) 56 (09/18/23 0841)  Resp: 18 (09/18/23 0841)  BP: 124/72 (09/18/23 0841)  SpO2: 97 % (09/18/23 0841) Vital Signs (24h Range):  Temp:  [97.9 °F (36.6 °C)-99.7 °F (37.6 °C)] 97.9 °F (36.6 °C)  Pulse:  [56-92] 56  Resp:  [16-21] 18  SpO2:  [97 %-99 %] 97 %  BP: (120-137)/(65-86) 124/72     Weight: 69.9 kg (154 lb) (09/17/23 1841)  Body mass index is 22.1 kg/m².      Intake/Output Summary (Last 24 hours) at 9/18/2023 1039  Last data filed at 9/18/2023 1019  Gross per 24 hour   Intake --   Output 325 ml   Net -325 ml       Lines/Drains/Airways       Peripheral Intravenous Line  Duration                  Peripheral IV - Single Lumen 09/17/23 2035 20 G Left;Posterior Hand <1 day                      Physical Exam  Constitutional:       Appearance: He is normal weight.   Cardiovascular:      Rate and Rhythm: Normal rate and regular rhythm.      Pulses: Normal pulses.      Heart sounds: Normal heart sounds.   Pulmonary:      Effort: Pulmonary effort is normal.      Breath sounds: Normal breath sounds.   Abdominal:      General: Abdomen is flat. There is no distension.      Palpations: Abdomen is soft.      Tenderness: There is abdominal tenderness. There is no guarding or rebound.   Skin:     General: Skin is warm.   Neurological:      General: No focal deficit present.      Mental Status: He is alert and oriented to person, place, and time.          Significant Labs:  CBC:   Recent Labs   Lab 09/17/23 2036 09/18/23  0356 09/18/23  0839   WBC 7.74 7.66 6.32   HGB 12.2* 11.1* 11.4*   HCT 37.9* 34.9* 35.9*    262 243     CMP:   Recent Labs   Lab 09/18/23  0356   GLU 85   CALCIUM 7.9*   ALBUMIN 2.9*   PROT 5.4*      K 3.2*   CO2 20*      BUN 8   CREATININE 0.7   ALKPHOS 84   ALT 35   AST 42*   BILITOT 0.5       Significant Imaging:  Imaging results within the past 24 hours have been reviewed.

## 2023-09-18 NOTE — ASSESSMENT & PLAN NOTE
"- PCR pending   - CT abdomen with "hepatomegaly with geographic areas of hepatic steatosis and areas of relative fatty sparing."    "

## 2023-09-18 NOTE — ASSESSMENT & PLAN NOTE
-Started on GIB pathway.  -Keep patient NPO.  -Follow H/H closely. Type and screen blood and transfuse as needed.  -Received 80mg IV Protonix in the ED, continue 40mg IV BID  -Consult GI, appreciate recs.  -Continue IVF hydration.   -Use IV anti-emetics as needed.

## 2023-09-18 NOTE — PHARMACY MED REC
"  Admission Medication History     The home medication history was taken by Radha Chacon.    You may go to "Admission" then "Reconcile Home Medications" tabs to review and/or act upon these items.     The home medication list has been updated by the Pharmacy department.   Please read ALL comments highlighted in yellow.   Please address this information as you see fit.    Feel free to contact us if you have any questions or require assistance.      The medications listed below were removed from the home medication list. Please reorder if appropriate:  Patient reports no longer taking the following medication(s):  ALBUTEROL HFA 90 MCG/ACT INH  NYSTATIN 100,000 UNIT/ML SUSP  OMEPRAZOLE 20 MG  SOD SULF-POT CHLORIDE-MGA SULF (SUTAB) TAB      Medications listed below were obtained from: Patient/family  Current Outpatient Medications on File Prior to Encounter   Medication Sig    sihpewhcp-rypdwheh-qqlzxlx ala (BIKTARVY) -25 mg (25 kg or greater)   Take 1 tablet by mouth once daily.    ibuprofen (ADVIL,MOTRIN) 400 MG tablet   Take 400 mg by mouth every 4 (four) hours as needed (Inflammation).    ondansetron (ZOFRAN-ODT) 4 MG TbDL Take 1 tablet (4 mg total) by mouth every 6 (six) hours as needed (nausea). (Patient not taking: Reported on 9/18/2023)     Potential issues to be addressed PRIOR TO DISCHARGE  Patient reported not taking the following medications: (ZOFRAN ODT). These medications remain on the home medication list. Please address accordingly.               Radha Chacon  EXT [id number]                .          "
GI: Dr. Master Muñoz # [phone]

## 2023-09-18 NOTE — ED NOTES
Pt care assumed. Report received by ROLANDO Franklin. Pt lying in stretcher in low and locked position and side rails raised x2. Call light, pt's belongings, and bedside table within pt's reach. Pt on continuous cardiac monitoring, p Pt in NAD and verbalized no needs at this time.

## 2023-09-18 NOTE — ED TRIAGE NOTES
Tasia Cardona, a 23 y.o. male presents to the ED w/ complaint of intermittent sharp pain to L face and ear x1 week. Also reports 5 episodes of bright red hematemesis today and generalized abdominal pain. States unable to keep anything down. Now feeling dizzy. +chills. Hx of polycythemia vera and HIV    Triage note:  Chief Complaint   Patient presents with    Facial Pain     L side of face pain and ear, vomiting for 3-4 d and blood in vomit, hx polycythemia vera and hiv    Hematemesis     Review of patient's allergies indicates:   Allergen Reactions    [place] Swelling    Pcn [penicillins] Hives    Pecan nut Swelling    Soy     Sulfa (sulfonamide antibiotics) Hives     Past Medical History:   Diagnosis Date    Asthma

## 2023-09-18 NOTE — HOSPITAL COURSE
Tasia Cardona was placed in  observation for workup of GI bleeding. Hgb 11.1, below baseline. GI consulted and performed EGD revealing Corinne-Chauhan tear, Erythematous mucosa in the antrum (bx taken), and Congested duodenal mucosa (bx taken). CLD. Antiemetics scheduled to prevent retching. Continue protonix BID (IV while unable to tolerate PO). Pt with new severe epigastric pain on the day following the EGD. IV pain control, making adjustments as needed. Lipase now elevated to 263 (12 on admit). Begin tx for pancreatitis with aggressive IVF. 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 Heme consult for assistance with CMV serology reactive, discussing tx recs with ID. Repeat CD4 pending.  Collect stool studies if able.  DC when medically stable with plan for outpatient colonoscopy and GI f/u. Path results pending. Heme/onc recommended against AC of splenic thrombosis.    Pt noted to become tachycardic with increased WBCs on 9/21. ID consulted for assistance. Tmax 101 into 9/23, abx broadened to vanc and cefepime, then remained afebrile. Pt tolerating PO, pain better controlled, and eager to leave into 9/25. Pt deemed appropriate for discharge on PO abx with outpatient GI appt. Plan discussed with pt, who was agreeable and amenable; medications were discussed and reviewed, outpatient follow-up scheduled, ER precautions were given, all questions were answered to the pt's satisfaction, and Mr. Cardona was subsequently discharged.

## 2023-09-18 NOTE — HPI
"Tasia Cardona is a 23 y.o. male with a PMHx of HIV, Asthma, and polycythemia vera presenting with a c/o facial pain and hematemesis. The patient states that the vomiting began 4 days ago and was initially mild but quickly progressed to hematemesis. States that he sees bright red blood sometimes clots each time he vomits. Today he is had 5-6 episodes prior to presentation in the emergency department. He feels weak, admits abdominal discomfort and significant nausea. He attempted to take ibuprofen to help with his symptoms however this did not have any benefit he reported. Patient has not been able to tolerate any oral intake for the past 2 days given the amount of nausea. Additionally patient admits significant jaw pain primarily on the left near the TMJ junction. This has began progressively with the episodes of vomiting. He also notes he has been having watery diarrhea with 1-2 episodes of dark "clot-like" bowel movements for several months. He denies any LOC, syncope, or fatigue. Pt was recently seen by colorectal surgery (9/12/23) for similar complaints and was prescribed a PPI and antiemetic medication, as well as given a GI referral for EGD/colonoscopy.    In ED VSS, afebrile. CBC with mild anemia. PT/INR WNL. Bicarb 20. Calcium 8.5. AST 63. Lipase 12. Hepatitis C Ab reactive, hepatitis C quantitative PCR in process. CXR-Cardiomediastinal silhouette is not enlarged. No focal consolidation. No sizable pleural effusion. No pneumothorax. Given 80mg protonix, 4mg morhpine, & 4mg zofran IVP.   "

## 2023-09-18 NOTE — TRANSFER OF CARE
"Anesthesia Transfer of Care Note    Patient: Tasia Cardona    Procedure(s) Performed: Procedure(s) (LRB):  EGD (ESOPHAGOGASTRODUODENOSCOPY) (N/A)    Patient location: PACU    Anesthesia Type: general    Transport from OR: Transported from OR on room air with adequate spontaneous ventilation    Post pain: adequate analgesia    Post assessment: no apparent anesthetic complications and tolerated procedure well    Post vital signs: stable    Level of consciousness: awake, alert and oriented    Nausea/Vomiting: no nausea/vomiting    Complications: none    Transfer of care protocol was followed      Last vitals:   Visit Vitals  /74   Pulse 76   Temp 36.4 °C (97.5 °F) (Temporal)   Resp 20   Ht 5' 10" (1.778 m)   Wt 69.9 kg (154 lb)   SpO2 98%   BMI 22.10 kg/m²     "

## 2023-09-18 NOTE — ED PROVIDER NOTES
Encounter Date: 9/17/2023       History     Chief Complaint   Patient presents with    Facial Pain     L side of face pain and ear, vomiting for 3-4 d and blood in vomit, hx polycythemia vera and hiv    Hematemesis     Patient is a 23-year-old male with past medical history of HIV and polycythemia vera that presents to the emergency department 4 days history of nausea and vomiting.  Patient states that the vomiting began 4 days ago initially mild but quickly progressed to hematemesis.  States that he sees bright red blood sometimes clots each time he vomits.  Today he is had 5-6 episodes prior to presentation in the emergency department. He feels weak, admits abdominal discomfort and significant nausea.  He attempted to take ibuprofen to help with his symptoms however this did not have any benefit he reported.  Patient has not been able to tolerate any oral intake for the past 2 days given the amount of nausea.  Additionally patient admits significant jaw pain primarily on the left near the TMJ junction.  This has began progressively with the episodes of vomiting.    Denies any other symptoms at this time    The history is provided by the patient.     Review of patient's allergies indicates:   Allergen Reactions    [place] Swelling    Pcn [penicillins] Hives    Pecan nut Swelling    Soy     Sulfa (sulfonamide antibiotics) Hives     Past Medical History:   Diagnosis Date    Asthma      History reviewed. No pertinent surgical history.  Family History   Problem Relation Age of Onset    Pancreatitis Mother     Cancer Mother     Ovarian cancer Mother     No Known Problems Father     Cancer Brother     Breast cancer Maternal Grandmother      Social History     Tobacco Use    Smoking status: Some Days     Types: Cigarettes    Smokeless tobacco: Never   Substance Use Topics    Alcohol use: Yes    Drug use: Never     Review of Systems  ROS negative except as noted in HPI    Physical Exam     Initial Vitals [09/17/23 1841]    BP Pulse Resp Temp SpO2   136/86 92 18 99.7 °F (37.6 °C) 97 %      MAP       --         Physical Exam    Constitutional: He appears well-developed and well-nourished.   HENT:   Head: Normocephalic and atraumatic.   Right Ear: External ear normal.   Left Ear: External ear normal.   Nose: Nose normal.   Mouth/Throat: Oropharynx is clear and moist.   Eyes: Conjunctivae and EOM are normal.   Neck: Neck supple.   Normal range of motion.  Cardiovascular:  Normal rate, regular rhythm, normal heart sounds and intact distal pulses.           Pulmonary/Chest: Breath sounds normal.   Abdominal: Abdomen is flat. He exhibits no distension. There is no splenomegaly or hepatomegaly. There is abdominal tenderness.   Patient did have significant nausea reducible by palpation on left upper quadrant There is rebound. There is no guarding.   Musculoskeletal:      Cervical back: Normal range of motion and neck supple.     Neurological: He is alert and oriented to person, place, and time.   Skin: Skin is warm and dry. Capillary refill takes less than 2 seconds.           ED Course   Procedures  Labs Reviewed   HEPATITIS C ANTIBODY - Abnormal; Notable for the following components:       Result Value    Hepatitis C Ab Reactive (*)     All other components within normal limits    Narrative:     Release to patient->Immediate   CBC W/ AUTO DIFFERENTIAL - Abnormal; Notable for the following components:    RBC 4.21 (*)     Hemoglobin 12.2 (*)     Hematocrit 37.9 (*)     RDW 18.6 (*)     All other components within normal limits   COMPREHENSIVE METABOLIC PANEL - Abnormal; Notable for the following components:    CO2 20 (*)     Calcium 8.5 (*)     Albumin 3.4 (*)     AST 63 (*)     All other components within normal limits   CLOSTRIDIUM DIFFICILE   CULTURE, STOOL   LIPASE   PROTIME-INR   SEDIMENTATION RATE   C-REACTIVE PROTEIN   HEPATITIS C RNA, QUANTITATIVE, PCR   H. PYLORI ANTIGEN, STOOL   WBC, STOOL   ROTAVIRUS ANTIGEN, STOOL   CALPROTECTIN,  "STOOL   GIARDIA/CRYPTOSPORIDIUM (EIA)   STOOL EXAM-OVA,CYSTS,PARASITES   CYTOMEGALOVIRUS ANTIBODY, IGG   CBC W/ AUTO DIFFERENTIAL   COMPREHENSIVE METABOLIC PANEL   TYPE & SCREEN          Imaging Results              X-Ray Chest AP Portable (Final result)  Result time 09/17/23 21:22:05      Final result by Reyes Gary MD (09/17/23 21:22:05)                   Impression:      No acute cardiopulmonary finding identified on this single view.      Electronically signed by: Reyes Gary MD  Date:    09/17/2023  Time:    21:22               Narrative:    EXAMINATION:  XR CHEST AP PORTABLE    CLINICAL HISTORY:  Provided history is "  Shortness of breath".    TECHNIQUE:  One view of the chest.    COMPARISON:  08/02/2022.    FINDINGS:  Cardiac wires overlie the chest.  Cardiomediastinal silhouette is not enlarged.  No focal consolidation.  No sizable pleural effusion.  No pneumothorax.                                       Medications   albuterol inhaler 2 puff (has no administration in time range)   ospnwnlxl-feepedvt-rlisxec ala -25 mg (25 kg or greater) 1 tablet (has no administration in time range)   0.9%  NaCl infusion ( Intravenous New Bag 9/18/23 0022)   pantoprazole injection 40 mg (has no administration in time range)   acetaminophen tablet 650 mg (has no administration in time range)   acetaminophen tablet 1,000 mg (has no administration in time range)   morphine injection 2 mg (has no administration in time range)   ondansetron injection 4 mg (has no administration in time range)   HYDROcodone-acetaminophen 5-325 mg per tablet 1 tablet (1 tablet Oral Not Given 9/18/23 0130)   dicyclomine capsule 10 mg (has no administration in time range)   ondansetron injection 4 mg (4 mg Intravenous Given 9/17/23 2037)   pantoprazole injection 80 mg (80 mg Intravenous Given 9/17/23 2109)   morphine injection 4 mg (4 mg Intravenous Given 9/17/23 2126)     Medical Decision Making  Patient is a 23-year-old male with " past medical history of HIV, polycythemia vera that presented to the emergency department with 4 day history of hematemesis.     On initial evaluation patient was hemodynamically within normal limits, he was calm and cooperative with history taking.  Patient did have significant nausea reproducible on physical exam with abdominal palpation and oropharyngeal examination.  Patient did have tenderness to left TMJ but was fully able to open and close his mouth, no palpable mass or fluctuant tissue.  No skin changes appreciated in oropharynx or in left TMJ.  Abdominal examination demonstrated soft abdomen no tenderness palpation only the reproducible nausea.    Workup described in ED course.  Significant for new anemia which is significantly lower than his normal baseline.  Patient has history of polycythemia vera his hemoglobin at baseline is approximately 15 with frequent phlebotomy.  Last phlebotomy approximately 1 month ago    Patient was given IV Protonix given concern for upper GI bleed.    We will admit to hospital medicine for further treatment and workup    Amount and/or Complexity of Data Reviewed  Labs: ordered. Decision-making details documented in ED Course.  Radiology: ordered and independent interpretation performed. Decision-making details documented in ED Course.  ECG/medicine tests: ordered and independent interpretation performed. Decision-making details documented in ED Course.    Risk  Prescription drug management.              Attending Attestation:   Physician Attestation Statement for Resident:  As the supervising MD   Physician Attestation Statement: I have personally seen and examined this patient.   I agree with the above history.  -:   As the supervising MD I agree with the above PE.     As the supervising MD I agree with the above treatment, course, plan, and disposition.                    ED Course as of 09/18/23 0120   Sun Sep 17, 2023   2120 X-Ray Chest AP Portable  No consolidation,  edema or subcutaneous air   [TK]   2140 CBC auto differential(!)  New Anemia, baseline hemoglobin appears close to 15   [TK]   2155 X-Ray Chest AP Portable  CXR shows no acute disease per my independent interpretation.     [DC]      ED Course User Index  [DC] Srini Rick MD  [TK] Carlos Sawant DO          Critical Care   Date: 09/18/2023  Performed by: Srini Rick MD   Authorized by: Srini Rick MD    Total critical care time (exclusive of procedural time) : 45 minutes  Critical care was necessary to treat or prevent imminent or life-threatening deterioration of the following conditions:  hematemesis            Clinical Impression:   Final diagnoses:  [R06.02] SOB (shortness of breath)  [K92.0] Hematemesis with nausea (Primary)        ED Disposition Condition    Observation                 Carlos Sawant DO  Resident  09/18/23 0121       Srini Rick MD  09/18/23 9728

## 2023-09-18 NOTE — H&P
Short Stay Endoscopy History and Physical    PCP - No, Primary Doctor     Procedure - EGD  ASA - per anesthesia  Mallampati - per anesthesia  History of Anesthesia problems - no  Family history Anesthesia problems -  no   Plan of anesthesia - General    HPI:  This is a 23 y.o. male here for evaluation of :     Hematemesis      ROS:  Constitutional: No fevers, chills, No weight loss  CV: No chest pain  Pulm: No cough, No shortness of breath  Ophtho: No vision changes  GI: see HPI  Derm: No rash    Medical History:  has a past medical history of Asthma.    Surgical History:  has no past surgical history on file.    Family History: family history includes Breast cancer in his maternal grandmother; Cancer in his brother and mother; No Known Problems in his father; Ovarian cancer in his mother; Pancreatitis in his mother.. Otherwise no colon cancer, inflammatory bowel disease, or GI malignancies.    Social History:  reports that he has been smoking cigarettes. He has never used smokeless tobacco. He reports current alcohol use. He reports that he does not use drugs.    Review of patient's allergies indicates:   Allergen Reactions    [place] Swelling    Pcn [penicillins] Hives    Pecan nut Swelling    Soy     Sulfa (sulfonamide antibiotics) Hives       Medications:   Medications Prior to Admission   Medication Sig Dispense Refill Last Dose    uoudqjifl-zpvwnaje-lfulcov ala (BIKTARVY) -25 mg (25 kg or greater) Take 1 tablet by mouth once daily. 30 tablet 5     ibuprofen (ADVIL,MOTRIN) 400 MG tablet Take 400 mg by mouth every 4 (four) hours as needed (Inflammation).       ondansetron (ZOFRAN-ODT) 4 MG TbDL Take 1 tablet (4 mg total) by mouth every 6 (six) hours as needed (nausea). (Patient not taking: Reported on 9/18/2023) 30 tablet 2 Not Taking       Physical Exam:    Vital Signs:   Vitals:    09/18/23 1111   BP: 124/77   Pulse: 66   Resp: 17   Temp: 98.2 °F (36.8 °C)       General Appearance: Well appearing in  no acute distress  Eyes:    No scleral icterus  ENT: Neck supple, Lips, mucosa, and tongue normal; teeth and gums normal  Abdomen: Soft, non tender, non distended with normal bowel sounds. No hepatosplenomegaly, ascites, or mass.  Extremities: No edema  Skin: No rash    Labs:  Lab Results   Component Value Date    WBC 6.32 09/18/2023    HGB 11.4 (L) 09/18/2023    HCT 35.9 (L) 09/18/2023     09/18/2023    ALT 35 09/18/2023    AST 42 (H) 09/18/2023     09/18/2023    K 3.2 (L) 09/18/2023     09/18/2023    CREATININE 0.7 09/18/2023    BUN 8 09/18/2023    CO2 20 (L) 09/18/2023    TSH 1.858 12/17/2022    INR 1.1 09/17/2023       I have explained the risks and benefits of endoscopy procedures to the patient including but not limited to bleeding, perforation, infection, and death.  The patient was asked if they understand and allowed to ask any further questions to their satisfaction.      Tony Matos MD

## 2023-09-19 PROBLEM — R10.13 EPIGASTRIC PAIN: Status: ACTIVE | Noted: 2023-09-19

## 2023-09-19 LAB
ALBUMIN SERPL BCP-MCNC: 3.2 G/DL (ref 3.5–5.2)
ALP SERPL-CCNC: 104 U/L (ref 55–135)
ALT SERPL W/O P-5'-P-CCNC: 28 U/L (ref 10–44)
ANION GAP SERPL CALC-SCNC: 11 MMOL/L (ref 8–16)
AST SERPL-CCNC: 34 U/L (ref 10–40)
BASOPHILS # BLD AUTO: 0.02 K/UL (ref 0–0.2)
BASOPHILS NFR BLD: 0.2 % (ref 0–1.9)
BILIRUB SERPL-MCNC: 1 MG/DL (ref 0.1–1)
BUN SERPL-MCNC: 5 MG/DL (ref 6–20)
CALCIUM SERPL-MCNC: 8.5 MG/DL (ref 8.7–10.5)
CHLORIDE SERPL-SCNC: 104 MMOL/L (ref 95–110)
CMV IGG SERPL QL IA: REACTIVE
CO2 SERPL-SCNC: 23 MMOL/L (ref 23–29)
CREAT SERPL-MCNC: 0.7 MG/DL (ref 0.5–1.4)
DIFFERENTIAL METHOD: ABNORMAL
EOSINOPHIL # BLD AUTO: 0.1 K/UL (ref 0–0.5)
EOSINOPHIL NFR BLD: 1.3 % (ref 0–8)
ERYTHROCYTE [DISTWIDTH] IN BLOOD BY AUTOMATED COUNT: 18.4 % (ref 11.5–14.5)
EST. GFR  (NO RACE VARIABLE): >60 ML/MIN/1.73 M^2
GLUCOSE SERPL-MCNC: 96 MG/DL (ref 70–110)
HCT VFR BLD AUTO: 39.4 % (ref 40–54)
HCV RNA SERPL QL NAA+PROBE: NOT DETECTED
HCV RNA SPEC NAA+PROBE-ACNC: NOT DETECTED IU/ML
HGB BLD-MCNC: 12.5 G/DL (ref 14–18)
IMM GRANULOCYTES # BLD AUTO: 0.04 K/UL (ref 0–0.04)
IMM GRANULOCYTES NFR BLD AUTO: 0.4 % (ref 0–0.5)
LIPASE SERPL-CCNC: 263 U/L (ref 4–60)
LYMPHOCYTES # BLD AUTO: 1.4 K/UL (ref 1–4.8)
LYMPHOCYTES NFR BLD: 13.7 % (ref 18–48)
MCH RBC QN AUTO: 29.6 PG (ref 27–31)
MCHC RBC AUTO-ENTMCNC: 31.7 G/DL (ref 32–36)
MCV RBC AUTO: 93 FL (ref 82–98)
MONOCYTES # BLD AUTO: 1 K/UL (ref 0.3–1)
MONOCYTES NFR BLD: 10.2 % (ref 4–15)
NEUTROPHILS # BLD AUTO: 7.5 K/UL (ref 1.8–7.7)
NEUTROPHILS NFR BLD: 74.2 % (ref 38–73)
NRBC BLD-RTO: 0 /100 WBC
PLATELET # BLD AUTO: 253 K/UL (ref 150–450)
PMV BLD AUTO: 10.3 FL (ref 9.2–12.9)
POTASSIUM SERPL-SCNC: 3.3 MMOL/L (ref 3.5–5.1)
PROT SERPL-MCNC: 6.1 G/DL (ref 6–8.4)
RBC # BLD AUTO: 4.23 M/UL (ref 4.6–6.2)
SODIUM SERPL-SCNC: 138 MMOL/L (ref 136–145)
WBC # BLD AUTO: 10.15 K/UL (ref 3.9–12.7)

## 2023-09-19 PROCEDURE — 93005 ELECTROCARDIOGRAM TRACING: CPT

## 2023-09-19 PROCEDURE — 99233 PR SUBSEQUENT HOSPITAL CARE,LEVL III: ICD-10-PCS | Mod: ,,,

## 2023-09-19 PROCEDURE — 80053 COMPREHEN METABOLIC PANEL: CPT | Performed by: NURSE PRACTITIONER

## 2023-09-19 PROCEDURE — 85025 COMPLETE CBC W/AUTO DIFF WBC: CPT | Performed by: NURSE PRACTITIONER

## 2023-09-19 PROCEDURE — 94761 N-INVAS EAR/PLS OXIMETRY MLT: CPT

## 2023-09-19 PROCEDURE — 99233 SBSQ HOSP IP/OBS HIGH 50: CPT | Mod: ,,,

## 2023-09-19 PROCEDURE — 63600175 PHARM REV CODE 636 W HCPCS

## 2023-09-19 PROCEDURE — 96372 THER/PROPH/DIAG INJ SC/IM: CPT

## 2023-09-19 PROCEDURE — 93010 ELECTROCARDIOGRAM REPORT: CPT | Mod: ,,, | Performed by: INTERNAL MEDICINE

## 2023-09-19 PROCEDURE — 36415 COLL VENOUS BLD VENIPUNCTURE: CPT | Performed by: STUDENT IN AN ORGANIZED HEALTH CARE EDUCATION/TRAINING PROGRAM

## 2023-09-19 PROCEDURE — C9113 INJ PANTOPRAZOLE SODIUM, VIA: HCPCS

## 2023-09-19 PROCEDURE — 21400001 HC TELEMETRY ROOM

## 2023-09-19 PROCEDURE — 86364 TISS TRNSGLTMNASE EA IG CLAS: CPT | Performed by: STUDENT IN AN ORGANIZED HEALTH CARE EDUCATION/TRAINING PROGRAM

## 2023-09-19 PROCEDURE — 63600175 PHARM REV CODE 636 W HCPCS: Performed by: INTERNAL MEDICINE

## 2023-09-19 PROCEDURE — 83690 ASSAY OF LIPASE: CPT | Performed by: NURSE PRACTITIONER

## 2023-09-19 PROCEDURE — 25000003 PHARM REV CODE 250

## 2023-09-19 PROCEDURE — 25000003 PHARM REV CODE 250: Performed by: NURSE PRACTITIONER

## 2023-09-19 PROCEDURE — 99232 SBSQ HOSP IP/OBS MODERATE 35: CPT | Mod: ,,,

## 2023-09-19 PROCEDURE — 93010 EKG 12-LEAD: ICD-10-PCS | Mod: ,,, | Performed by: INTERNAL MEDICINE

## 2023-09-19 PROCEDURE — 82784 ASSAY IGA/IGD/IGG/IGM EACH: CPT | Performed by: STUDENT IN AN ORGANIZED HEALTH CARE EDUCATION/TRAINING PROGRAM

## 2023-09-19 PROCEDURE — 99232 PR SUBSEQUENT HOSPITAL CARE,LEVL II: ICD-10-PCS | Mod: ,,,

## 2023-09-19 PROCEDURE — G0378 HOSPITAL OBSERVATION PER HR: HCPCS

## 2023-09-19 RX ORDER — PROMETHAZINE HYDROCHLORIDE 25 MG/ML
12.5 INJECTION, SOLUTION INTRAMUSCULAR; INTRAVENOUS EVERY 6 HOURS PRN
Status: DISCONTINUED | OUTPATIENT
Start: 2023-09-19 | End: 2023-09-20

## 2023-09-19 RX ORDER — HYDROMORPHONE HYDROCHLORIDE 1 MG/ML
1 INJECTION, SOLUTION INTRAMUSCULAR; INTRAVENOUS; SUBCUTANEOUS EVERY 4 HOURS PRN
Status: DISCONTINUED | OUTPATIENT
Start: 2023-09-19 | End: 2023-09-20

## 2023-09-19 RX ORDER — MORPHINE SULFATE 4 MG/ML
2 INJECTION, SOLUTION INTRAMUSCULAR; INTRAVENOUS EVERY 4 HOURS PRN
Status: DISCONTINUED | OUTPATIENT
Start: 2023-09-19 | End: 2023-09-19

## 2023-09-19 RX ORDER — OXYCODONE AND ACETAMINOPHEN 5; 325 MG/1; MG/1
1 TABLET ORAL EVERY 4 HOURS PRN
Status: DISCONTINUED | OUTPATIENT
Start: 2023-09-19 | End: 2023-09-19

## 2023-09-19 RX ORDER — HYDROMORPHONE HYDROCHLORIDE 1 MG/ML
0.5 INJECTION, SOLUTION INTRAMUSCULAR; INTRAVENOUS; SUBCUTANEOUS EVERY 4 HOURS PRN
Status: DISCONTINUED | OUTPATIENT
Start: 2023-09-19 | End: 2023-09-20

## 2023-09-19 RX ORDER — SODIUM CHLORIDE, SODIUM LACTATE, POTASSIUM CHLORIDE, CALCIUM CHLORIDE 600; 310; 30; 20 MG/100ML; MG/100ML; MG/100ML; MG/100ML
INJECTION, SOLUTION INTRAVENOUS CONTINUOUS
Status: DISCONTINUED | OUTPATIENT
Start: 2023-09-19 | End: 2023-09-19

## 2023-09-19 RX ORDER — POTASSIUM CHLORIDE 7.45 MG/ML
10 INJECTION INTRAVENOUS ONCE
Status: COMPLETED | OUTPATIENT
Start: 2023-09-19 | End: 2023-09-19

## 2023-09-19 RX ORDER — PROMETHAZINE HYDROCHLORIDE 25 MG/ML
25 INJECTION, SOLUTION INTRAMUSCULAR; INTRAVENOUS EVERY 6 HOURS PRN
Status: DISCONTINUED | OUTPATIENT
Start: 2023-09-19 | End: 2023-09-19

## 2023-09-19 RX ORDER — PROCHLORPERAZINE EDISYLATE 5 MG/ML
5 INJECTION INTRAMUSCULAR; INTRAVENOUS EVERY 6 HOURS
Status: DISCONTINUED | OUTPATIENT
Start: 2023-09-19 | End: 2023-09-25 | Stop reason: HOSPADM

## 2023-09-19 RX ORDER — OXYCODONE AND ACETAMINOPHEN 10; 325 MG/1; MG/1
1 TABLET ORAL EVERY 4 HOURS PRN
Status: DISCONTINUED | OUTPATIENT
Start: 2023-09-19 | End: 2023-09-19

## 2023-09-19 RX ORDER — PANTOPRAZOLE SODIUM 40 MG/10ML
40 INJECTION, POWDER, LYOPHILIZED, FOR SOLUTION INTRAVENOUS 2 TIMES DAILY
Status: DISCONTINUED | OUTPATIENT
Start: 2023-09-19 | End: 2023-09-25 | Stop reason: HOSPADM

## 2023-09-19 RX ORDER — SODIUM CHLORIDE, SODIUM LACTATE, POTASSIUM CHLORIDE, CALCIUM CHLORIDE 600; 310; 30; 20 MG/100ML; MG/100ML; MG/100ML; MG/100ML
INJECTION, SOLUTION INTRAVENOUS CONTINUOUS
Status: ACTIVE | OUTPATIENT
Start: 2023-09-19 | End: 2023-09-20

## 2023-09-19 RX ADMIN — SODIUM CHLORIDE, POTASSIUM CHLORIDE, SODIUM LACTATE AND CALCIUM CHLORIDE: 600; 310; 30; 20 INJECTION, SOLUTION INTRAVENOUS at 11:09

## 2023-09-19 RX ADMIN — POTASSIUM CHLORIDE 10 MEQ: 7.46 INJECTION, SOLUTION INTRAVENOUS at 11:09

## 2023-09-19 RX ADMIN — PANTOPRAZOLE SODIUM 40 MG: 40 INJECTION, POWDER, FOR SOLUTION INTRAVENOUS at 12:09

## 2023-09-19 RX ADMIN — HYDROMORPHONE HYDROCHLORIDE 1 MG: 1 INJECTION, SOLUTION INTRAMUSCULAR; INTRAVENOUS; SUBCUTANEOUS at 12:09

## 2023-09-19 RX ADMIN — POTASSIUM CHLORIDE 10 MEQ: 7.46 INJECTION, SOLUTION INTRAVENOUS at 06:09

## 2023-09-19 RX ADMIN — PROMETHAZINE HYDROCHLORIDE 12.5 MG: 25 INJECTION INTRAMUSCULAR; INTRAVENOUS at 09:09

## 2023-09-19 RX ADMIN — PROMETHAZINE HYDROCHLORIDE 12.5 MG: 25 INJECTION INTRAMUSCULAR; INTRAVENOUS at 10:09

## 2023-09-19 RX ADMIN — PANTOPRAZOLE SODIUM 40 MG: 40 TABLET, DELAYED RELEASE ORAL at 06:09

## 2023-09-19 RX ADMIN — HYDROMORPHONE HYDROCHLORIDE 1 MG: 1 INJECTION, SOLUTION INTRAMUSCULAR; INTRAVENOUS; SUBCUTANEOUS at 08:09

## 2023-09-19 RX ADMIN — ONDANSETRON 4 MG: 2 INJECTION INTRAMUSCULAR; INTRAVENOUS at 08:09

## 2023-09-19 RX ADMIN — HYDROMORPHONE HYDROCHLORIDE 1 MG: 1 INJECTION, SOLUTION INTRAMUSCULAR; INTRAVENOUS; SUBCUTANEOUS at 04:09

## 2023-09-19 RX ADMIN — BICTEGRAVIR SODIUM, EMTRICITABINE, AND TENOFOVIR ALAFENAMIDE FUMARATE 1 TABLET: 50; 200; 25 TABLET ORAL at 08:09

## 2023-09-19 RX ADMIN — PANTOPRAZOLE SODIUM 40 MG: 40 INJECTION, POWDER, FOR SOLUTION INTRAVENOUS at 08:09

## 2023-09-19 RX ADMIN — MORPHINE SULFATE 1 MG: 4 INJECTION INTRAVENOUS at 08:09

## 2023-09-19 RX ADMIN — PROCHLORPERAZINE EDISYLATE 5 MG: 5 INJECTION INTRAMUSCULAR; INTRAVENOUS at 12:09

## 2023-09-19 RX ADMIN — PROCHLORPERAZINE EDISYLATE 5 MG: 5 INJECTION INTRAMUSCULAR; INTRAVENOUS at 05:09

## 2023-09-19 RX ADMIN — ACETAMINOPHEN 1000 MG: 500 TABLET ORAL at 05:09

## 2023-09-19 NOTE — PROGRESS NOTES
[facility]  Gastroenterology  Progress Note    Patient Name: Tasia Cardona  MRN: [id number]  Admission Date: 9/17/2023  Hospital Length of Stay: 0 days  Code Status: Full Code   Attending Provider: Srini Ferreira MD  Consulting Provider: Roxy Adams NP  Primary Care Physician: Brittany, Primary Doctor  Principal Problem: Hematemesis    Subjective:     Interval History: Followed up with patient s/p EGD 9/18 with mild gastritis and brandy hitchcock tear. Patient reports new onset upper epigastric abdominal pain this AM. He also reports some nausea and NB bilious vomiting. Patient has taken zofran for nausea, but it doesn't help, phenergan helps, but it puts patient to sleep. Recent CT scan did show biliary sludge but no gallstones. No notable inflammation noted in his pancreas. Stool studies ordered for his subjective diarrhea, however patient hasnt had a BM since Saturday.    Review of Systems   Constitutional:  Negative for fatigue and fever.   HENT:  Negative for sore throat.    Respiratory:  Negative for apnea, cough and shortness of breath.    Cardiovascular:  Negative for chest pain, palpitations and leg swelling.   Gastrointestinal:  Positive for abdominal pain, nausea and vomiting. Negative for constipation and diarrhea.   Genitourinary:  Negative for dysuria, frequency and urgency.   Skin:  Negative for rash.     Objective:     Vital Signs (Most Recent):  Temp: 97 °F (36.1 °C) (09/19/23 0833)  Pulse: (!) 54 (09/19/23 0833)  Resp: 18 (09/19/23 0833)  BP: (!) 157/79 (09/19/23 0833)  SpO2: 100 % (09/19/23 0833) Vital Signs (24h Range):  Temp:  [97 °F (36.1 °C)-98.9 °F (37.2 °C)] 97 °F (36.1 °C)  Pulse:  [43-76] 54  Resp:  [10-35] 18  SpO2:  [96 %-100 %] 100 %  BP: (108-169)/(62-94) 157/79     Weight: 75.3 kg (166 lb) (09/18/23 1950)  Body mass index is 23.82 kg/m².      Intake/Output Summary (Last 24 hours) at 9/19/2023 0913  Last data filed at 9/18/2023 1214  Gross per 24 hour   Intake 500 ml   Output  325 ml   Net 175 ml       Lines/Drains/Airways       Peripheral Intravenous Line  Duration                  Peripheral IV - Single Lumen 09/17/23 2035 20 G Left;Posterior Hand 1 day                     Physical Exam  Constitutional:       General: He is in acute distress.   HENT:      Head: Normocephalic.   Cardiovascular:      Rate and Rhythm: Normal rate and regular rhythm.   Abdominal:      General: Abdomen is flat. Bowel sounds are normal. There is no distension.      Palpations: Abdomen is soft. There is no mass.      Tenderness: There is abdominal tenderness in the right upper quadrant and left upper quadrant. There is no guarding.   Skin:     General: Skin is warm and dry.      Capillary Refill: Capillary refill takes less than 2 seconds.      Coloration: Skin is not jaundiced or pale.      Findings: No bruising or erythema.   Neurological:      Mental Status: He is alert and oriented to person, place, and time. Mental status is at baseline.          Significant Labs:  CBC:   Recent Labs   Lab 09/18/23  1300 09/18/23  1854 09/19/23  0636   WBC 7.45 8.96 10.15   HGB 12.7* 12.4* 12.5*   HCT 40.3 38.7* 39.4*    259 253     CMP:   Recent Labs   Lab 09/18/23  0356 09/19/23  0636   GLU 85 96   CALCIUM 7.9*  --    ALBUMIN 2.9* 3.2*   PROT 5.4*  --      --    K 3.2*  --    CO2 20* 23    104   BUN 8 5*   CREATININE 0.7 0.7   ALKPHOS 84 104   ALT 35 28   AST 42* 34   BILITOT 0.5  --          Significant Imaging:  Imaging results within the past 24 hours have been reviewed.    EGD 9/18/2023  Impression:            - Corinne-Chauhan tear.                          - Erythematous mucosa in the antrum. Biopsied.                          - Congested duodenal mucosa. Biopsied.   Recommendation:        - [facility].                          - Advance diet as tolerated.                          - Continue present medications.                          - Use Protonix  (pantoprazole) 40 mg PO BID.                          - Scheduled anti-emetics to prevent retching.                          - Since patient is immunocompromsied and having                          diarrhea, agree with stool studies (ova/parasite,                          Giardia/cryptosporidium, calprotectin).                          - Outpatient colonoscopy.                          - Await pathology results.   Attending Participation:        I was present and participated during the entire procedure,        including non-naqvi portions.   Kg Cleaning MD   9/18/2023 12:24:43 PM     Assessment/Plan:     GI  * Hematemesis  Pt presented w/acute on chronic hematemesis, usually preceeded by normal vomiting before quickly progressing to BRB that resolves spontaneously, w/ intermittent NSAID use concerning for recurrent brandy hitchcock tear vs. esophagitis. Pt w/ undetectable viral load/compliance w/ biktarvy. EGD showing erythematous gastric antrum mucosa, and duodenal mucosa congestion - bx taken - and brandy hitchcock tear - management consists of supportive care while the lesion heals. Patient w/ ongoing severe epigastric abdominal pain, that is worse when leaning forward following the procedure. Pain likely related to ongoing brandy hitchcock tear. Still with epigastric pain and episodes of NB bilious vomiting this AM. Zofran, Morphine and 1x dose of Phenergan ordered to help treat patients acute symptoms. CT Abdomen with biliary sludge, but not inflammation or stones contributing to severe symptoms. Lipase added on this AM to assess for acute pancreatitis.    - Can consider GI cocktail to help with acute pain   - Advance diet as tolerated.   - Continue present medications.   - Use Protonix (pantoprazole) 40 mg PO BID.   - Scheduled anti-emetics to prevent retching.  - Since patient is immunocompromsied and having diarrhea, agree with stool studies (ova/parasite, Giardia/cryptosporidium, calprotectin).     - Outpatient  colonoscopy.   - Await pathology results.         Thank you for your consult. I will follow-up with patient. Please contact us if you have any additional questions.    Roxy Adams NP  Gastroenterology  [facility]

## 2023-09-19 NOTE — PROGRESS NOTES
"[facility] [address] [place] [address] Medicine  Progress Note    Patient Name: Tasia Cardona  MRN: [id number]  Patient Class: OP- Observation   Admission Date: 9/17/2023  Length of Stay: 0 days  Attending Physician: Srini Ferreira MD  Primary Care Provider: Brittany, Primary Doctor        Subjective:     Principal Problem:Hematemesis        HPI:  Tasia Cardona is a 23 y.o. male with a PMHx of HIV, Asthma, and polycythemia vera presenting with a c/o facial pain and hematemesis. The patient states that the vomiting began 4 days ago and was initially mild but quickly progressed to hematemesis. States that he sees bright red blood sometimes clots each time he vomits. Today he is had 5-6 episodes prior to presentation in the emergency department. He feels weak, admits abdominal discomfort and significant nausea. He attempted to take ibuprofen to help with his symptoms however this did not have any benefit he reported. Patient has not been able to tolerate any oral intake for the past 2 days given the amount of nausea. Additionally patient admits significant jaw pain primarily on the left near the TMJ junction. This has began progressively with the episodes of vomiting. He also notes he has been having watery diarrhea with 1-2 episodes of dark "clot-like" bowel movements for several months. He denies any LOC, syncope, or fatigue. Pt was recently seen by colorectal surgery (9/12/23) for similar complaints and was prescribed a PPI and antiemetic medication, as well as given a GI referral for EGD/colonoscopy.    In ED VSS, afebrile. CBC with mild anemia. PT/INR WNL. Bicarb 20. Calcium 8.5. AST 63. Lipase 12. Hepatitis C Ab reactive, hepatitis C quantitative PCR in process. CXR-Cardiomediastinal silhouette is not enlarged. No focal consolidation. No sizable pleural effusion. No pneumothorax. Given 80mg protonix, 4mg morhpine, & 4mg zofran IVP.       Overview/Hospital Course:  Tasia Cardona was placed in  observation for workup of GI " bleeding. Hgb 11.1, below baseline. GI consulted and performed EGD revealing Brandy-Chauhan tear, Erythematous mucosa in the antrum (bx taken), and Congested duodenal mucosa (bx taken). Pt with new severe epigastric pain on the day following the EGD, GI suspected 2/2 brandy-chauhan tear. IV pain control. CLD. Will advance diet as tolerated, antiemetics scheduled to prevent retching. Continue protonix BID (IV while unable to tolerate PO). Collect stool studies. Check lipase. DC when medically stable with plan for outpatient colonoscopy and GI f/u. Path results pending.      Interval History: New onset epigastric pain this morning, severe. Patient reports pain started yesterday after the procedure but became severe this morning. EGD yesterday without complication during procedure. Unable to tolerate PO d/t frequent bilious emesis. IV antiemetics scheduled. IV pain control, will wean as tolerated. Check lipase. F/u GI recs. Monitor OTC with EKG.    Review of Systems   Constitutional:  Positive for chills. Negative for fatigue, fever and unexpected weight change.   HENT:  Positive for sore throat. Negative for drooling, mouth sores and sinus pressure.         +Left sided jaw pain   Eyes:  Negative for photophobia and visual disturbance.   Respiratory:  Negative for cough, shortness of breath and wheezing.    Cardiovascular:  Negative for chest pain, palpitations and leg swelling.   Gastrointestinal:  Positive for abdominal pain (new epigastric pain, severe), diarrhea and nausea. Negative for abdominal distention, blood in stool and vomiting.   Genitourinary:  Negative for dysuria, frequency and hematuria.   Musculoskeletal:  Negative for back pain, gait problem and myalgias.   Skin:  Negative for color change, rash and wound.   Neurological:  Positive for weakness (generalized). Negative for dizziness, syncope and light-headedness.   Psychiatric/Behavioral:  Negative for confusion and hallucinations. The patient is not  nervous/anxious.      Objective:     Vital Signs (Most Recent):  Temp: 97 °F (36.1 °C) (09/19/23 0833)  Pulse: (!) 57 (09/19/23 0935)  Resp: 18 (09/19/23 0833)  BP: (!) 157/79 (09/19/23 0833)  SpO2: 100 % (09/19/23 0935) Vital Signs (24h Range):  Temp:  [97 °F (36.1 °C)-98.9 °F (37.2 °C)] 97 °F (36.1 °C)  Pulse:  [43-76] 57  Resp:  [10-35] 18  SpO2:  [96 %-100 %] 100 %  BP: (108-169)/(62-94) 157/79     Weight: 75.3 kg (166 lb)  Body mass index is 23.82 kg/m².    Intake/Output Summary (Last 24 hours) at 9/19/2023 1120  Last data filed at 9/18/2023 1214  Gross per 24 hour   Intake 500 ml   Output --   Net 500 ml         Physical Exam  Constitutional:       General: He is in acute distress (moderate distress 2/2 pain and retching).      Appearance: He is ill-appearing. He is not toxic-appearing or diaphoretic.   HENT:      Head: Normocephalic and atraumatic.      Jaw: Tenderness present.      Comments: Left side TTP     Nose: Nose normal.      Mouth/Throat:      Mouth: Mucous membranes are moist.      Pharynx: Oropharynx is clear.   Eyes:      Extraocular Movements: Extraocular movements intact.      Pupils: Pupils are equal, round, and reactive to light.   Neck:      Trachea: Trachea normal.   Cardiovascular:      Rate and Rhythm: Regular rhythm. Bradycardia present.      Pulses: Normal pulses.   Pulmonary:      Effort: Pulmonary effort is normal. No respiratory distress.      Breath sounds: Normal breath sounds. No wheezing, rhonchi or rales.   Abdominal:      General: Abdomen is flat. Bowel sounds are increased. There is no distension.      Palpations: Abdomen is soft.      Tenderness: There is abdominal tenderness in the right upper quadrant and epigastric area. There is no guarding.      Comments: Severe tenderness with light palpation in epigastric region   Musculoskeletal:         General: Normal range of motion.      Cervical back: Normal range of motion.      Right lower leg: No edema.      Left lower leg: No  edema.   Skin:     General: Skin is warm and dry.      Capillary Refill: Capillary refill takes less than 2 seconds.   Neurological:      General: No focal deficit present.      Mental Status: He is alert and oriented to person, place, and time.      Cranial Nerves: Cranial nerves 2-12 are intact.      Sensory: Sensation is intact.      Motor: Motor function is intact. No weakness.   Psychiatric:         Mood and Affect: Mood and affect normal.         Speech: Speech normal.         Behavior: Behavior normal. Behavior is cooperative.         Cognition and Memory: Cognition normal.         Judgment: Judgment normal.             Significant Labs: All pertinent labs within the past 24 hours have been reviewed.  CBC:   Recent Labs   Lab 09/18/23  1300 09/18/23  1854 09/19/23  0636   WBC 7.45 8.96 10.15   HGB 12.7* 12.4* 12.5*   HCT 40.3 38.7* 39.4*    259 253     CMP:   Recent Labs   Lab 09/17/23  2036 09/18/23  0356 09/19/23  0636    139 138   K 4.0 3.2* 3.3*    106 104   CO2 20* 20* 23   GLU 84 85 96   BUN 9 8 5*   CREATININE 0.7 0.7 0.7   CALCIUM 8.5* 7.9* 8.5*   PROT 6.7 5.4* 6.1   ALBUMIN 3.4* 2.9* 3.2*   BILITOT 0.3 0.5 1.0   ALKPHOS 101 84 104   AST 63* 42* 34   ALT 44 35 28   ANIONGAP 16 13 11       Significant Imaging: I have reviewed all pertinent imaging results/findings within the past 24 hours.      Assessment/Plan:      * Hematemesis  -Started on GIB pathway.  -Follow H/H closely. Type and screen blood and transfuse as needed.   -> on 8/30 Hgb 13.7, now 11.4  -Received 80mg IV Protonix in the ED, continue 40mg IV BID  -Consult GI, appreciate recs   -> EGD performed 9/19. See results below.   -Start CLD, advance as tolerated  -scheduled antiemetics to prevent recurrent emesis      Findings on EGD 9/19:  - Corinne-Chauhan tear.   - Erythematous mucosa in the antrum.  Biopsied.   - Congested duodenal mucosa.  Biopsied.   GI Plan:  - [facility].   -  "Advance diet as tolerated.   - Continue present medications.   - Use Protonix (pantoprazole) 40 mg PO BID.   - Scheduled anti-emetics to prevent retching.  - Since patient is immunocompromsied and having diarrhea, agree with stool studies (ova/parasite, Giardia/cryptosporidium, calprotectin).     - Outpatient colonoscopy.   - Await pathology results.     Epigastric pain  Epigastric TTP on arrival, but tenderness primarily RUQ. EGD 9/18. On 9/19, pain in epigastric region now severe. Requiring IV pain meds. Not tolerating PO, frequent emesis.   - F/u lab studies  - Liver enzymes wnl. Lipase pending.   - CT with possible biliary sludge but no evidence of GB inflammation  - IV dilaudid prn, wean as tolerated   - f/u GI recs      Bloody diarrhea  Intermittent, chronic.   Collect stool studies given immunocompromised state\   --> no BM's since admission. Minimal PO intake.      Hepatitis C antibody positive in blood  - PCR not detected.  - CT abdomen with "hepatomegaly with geographic areas of hepatic steatosis and areas of relative fatty sparing."  - will refer to hepatology      Mild intermittent asthma without complication  -No s/s of acute exacerbation.  -Continue PRN albuterol inhaler.    Jaw pain  -Pt with history of TMJ, likely aggravated by frequent vomiting. No warmth or erythema on exam.  -CT maxillofacial reveals dental caries.  -PRN tylenol.  - referral to dentist     Anemia  Patient's anemia is currently controlled. S/p 0 units of PRBCs.   Current CBC reviewed-   Lab Results   Component Value Date    HGB 12.2 (L) 09/17/2023    HCT 37.9 (L) 09/17/2023     Monitor serial CBC and transfuse if patient becomes hemodynamically unstable, symptomatic or H/H drops below 7/21.   -Anemia panel.    Polycythemia  Chronic issue, stable. Follows with hematology outpatient.    HIV infection  This patient in known to have HIV+ status (Have detected HIV PCR but never CD4 <200 or AIDS defining illness). Continue to monitor " routine labs. Last CD4 count was   Lab Results   Component Value Date    ABSOLUTECD4 1084 10/18/2022       We will not consult Infectious disease at this time. Other HIV-associated diseases are not present.    -Continue Home HIV medication  Stool studies pending      VTE Risk Mitigation (From admission, onward)         Ordered     Place sequential compression device  Until discontinued         09/17/23 2307     IP VTE LOW RISK PATIENT  Once         09/17/23 2307                Discharge Planning   CASTILLO: 9/21/2023     Code Status: Full Code   Is the patient medically ready for discharge?: No    Reason for patient still in hospital (select all that apply): Patient new problem, Patient trending condition, Laboratory test, Treatment and Consult recommendations  Discharge Plan A: Home                  Sunitha Morocho PA-C  Department of Hospital Medicine   [facility]

## 2023-09-19 NOTE — SUBJECTIVE & OBJECTIVE
Subjective:     Interval History: Followed up with patient s/p EGD 9/18 with mild gastritis and brandy hitchcock tear. Patient with ongoing upper abdominal pain and now elevated Lipase. STAT CT abdomen with contrast ordered for evaluation of patients pancreas and gallbladder as contributing to his acute onset of pain. He also reports some nausea and NB bilious vomiting. Stool studies ordered for his subjective diarrhea, however patient hasnt had a BM since Saturday.    Review of Systems   Constitutional:  Negative for fatigue and fever.   HENT:  Negative for sore throat.    Respiratory:  Negative for apnea, cough and shortness of breath.    Cardiovascular:  Negative for chest pain, palpitations and leg swelling.   Gastrointestinal:  Positive for abdominal pain, nausea and vomiting. Negative for constipation and diarrhea.   Genitourinary:  Negative for dysuria, frequency and urgency.   Skin:  Negative for rash.     Objective:     Vital Signs (Most Recent):  Temp: 98.1 °F (36.7 °C) (09/20/23 0811)  Pulse: 104 (09/20/23 0811)  Resp: 16 (09/20/23 0811)  BP: 132/89 (09/20/23 0811)  SpO2: 97 % (09/20/23 0811) Vital Signs (24h Range):  Temp:  [97.7 °F (36.5 °C)-98.1 °F (36.7 °C)] 98.1 °F (36.7 °C)  Pulse:  [] 104  Resp:  [16-20] 16  SpO2:  [96 %-100 %] 97 %  BP: (132-153)/(74-94) 132/89     Weight: 75.3 kg (166 lb) (09/18/23 1950)  Body mass index is 23.82 kg/m².      Intake/Output Summary (Last 24 hours) at 9/20/2023 0804  Last data filed at 9/20/2023 0413  Gross per 24 hour   Intake 1 ml   Output --   Net 1 ml         Lines/Drains/Airways       Peripheral Intravenous Line  Duration                  Peripheral IV - Single Lumen 09/19/23 1600 22 G Anterior;Proximal;Right Upper Arm <1 day                     Physical Exam  Constitutional:       General: He is in acute distress.   HENT:      Head: Normocephalic.   Eyes:      General: No scleral icterus.  Abdominal:      General: Abdomen is flat. Bowel sounds are normal.  There is no distension.      Palpations: Abdomen is soft. There is no mass.      Tenderness: There is abdominal tenderness in the right upper quadrant and left upper quadrant. There is guarding.   Skin:     General: Skin is warm and dry.      Capillary Refill: Capillary refill takes less than 2 seconds.      Coloration: Skin is not jaundiced or pale.      Findings: No bruising or erythema.   Neurological:      Mental Status: He is alert and oriented to person, place, and time. Mental status is at baseline.          Significant Labs:  CBC:   Recent Labs   Lab 09/18/23  1854 09/19/23  0636 09/20/23  0553   WBC 8.96 10.15 12.34   HGB 12.4* 12.5* 12.8*   HCT 38.7* 39.4* 39.6*    253 171       CMP:   Recent Labs   Lab 09/20/23  0553   *   CALCIUM 8.7   ALBUMIN 3.1*   PROT 6.0      K 3.8   CO2 26      BUN 6   CREATININE 0.7   ALKPHOS 111   ALT 26   AST 72*   BILITOT 1.1*           Significant Imaging:  Imaging results within the past 24 hours have been reviewed.    EGD 9/18/2023  Impression:            - Corinne-Chauhan tear.                          - Erythematous mucosa in the antrum. Biopsied.                          - Congested duodenal mucosa. Biopsied.   Recommendation:        - [facility].                          - Advance diet as tolerated.                          - Continue present medications.                          - Use Protonix (pantoprazole) 40 mg PO BID.                          - Scheduled anti-emetics to prevent retching.                          - Since patient is immunocompromsied and having                          diarrhea, agree with stool studies (ova/parasite,                          Giardia/cryptosporidium, calprotectin).                          - Outpatient colonoscopy.                          - Await pathology results.   Attending Participation:        I was present and participated during the entire procedure,         including non-naqvi portions.   Kg Cleaning MD   9/18/2023 12:24:43 PM

## 2023-09-19 NOTE — SUBJECTIVE & OBJECTIVE
Subjective:     Interval History: Followed up with patient s/p EGD 9/18 with mild gastritis and brandy hitchcock tear. Patient reports new onset upper epigastric abdominal pain this AM. He also reports some nausea and NB bilious vomiting. Patient has taken zofran for nausea, but it doesn't help, phenergan helps, but it puts patient to sleep. Recent CT scan did show biliary sludge but no gallstones. No notable inflammation noted in his pancreas. Stool studies ordered for his subjective diarrhea, however patient hasnt had a BM since Saturday.    Review of Systems   Constitutional:  Negative for fatigue and fever.   HENT:  Negative for sore throat.    Respiratory:  Negative for apnea, cough and shortness of breath.    Cardiovascular:  Negative for chest pain, palpitations and leg swelling.   Gastrointestinal:  Positive for abdominal pain, nausea and vomiting. Negative for constipation and diarrhea.   Genitourinary:  Negative for dysuria, frequency and urgency.   Skin:  Negative for rash.     Objective:     Vital Signs (Most Recent):  Temp: 97 °F (36.1 °C) (09/19/23 0833)  Pulse: (!) 54 (09/19/23 0833)  Resp: 18 (09/19/23 0833)  BP: (!) 157/79 (09/19/23 0833)  SpO2: 100 % (09/19/23 0833) Vital Signs (24h Range):  Temp:  [97 °F (36.1 °C)-98.9 °F (37.2 °C)] 97 °F (36.1 °C)  Pulse:  [43-76] 54  Resp:  [10-35] 18  SpO2:  [96 %-100 %] 100 %  BP: (108-169)/(62-94) 157/79     Weight: 75.3 kg (166 lb) (09/18/23 1950)  Body mass index is 23.82 kg/m².      Intake/Output Summary (Last 24 hours) at 9/19/2023 0913  Last data filed at 9/18/2023 1214  Gross per 24 hour   Intake 500 ml   Output 325 ml   Net 175 ml       Lines/Drains/Airways       Peripheral Intravenous Line  Duration                  Peripheral IV - Single Lumen 09/17/23 2035 20 G Left;Posterior Hand 1 day                     Physical Exam  Constitutional:       General: He is in acute distress.   HENT:      Head: Normocephalic.   Cardiovascular:      Rate and Rhythm:  Normal rate and regular rhythm.   Abdominal:      General: Abdomen is flat. Bowel sounds are normal. There is no distension.      Palpations: Abdomen is soft. There is no mass.      Tenderness: There is abdominal tenderness in the right upper quadrant and left upper quadrant. There is no guarding.   Skin:     General: Skin is warm and dry.      Capillary Refill: Capillary refill takes less than 2 seconds.      Coloration: Skin is not jaundiced or pale.      Findings: No bruising or erythema.   Neurological:      Mental Status: He is alert and oriented to person, place, and time. Mental status is at baseline.          Significant Labs:  CBC:   Recent Labs   Lab 09/18/23  1300 09/18/23  1854 09/19/23  0636   WBC 7.45 8.96 10.15   HGB 12.7* 12.4* 12.5*   HCT 40.3 38.7* 39.4*    259 253     CMP:   Recent Labs   Lab 09/18/23  0356 09/19/23  0636   GLU 85 96   CALCIUM 7.9*  --    ALBUMIN 2.9* 3.2*   PROT 5.4*  --      --    K 3.2*  --    CO2 20* 23    104   BUN 8 5*   CREATININE 0.7 0.7   ALKPHOS 84 104   ALT 35 28   AST 42* 34   BILITOT 0.5  --          Significant Imaging:  Imaging results within the past 24 hours have been reviewed.    EGD 9/18/2023  Impression:            - Corinne-Chauhan tear.                          - Erythematous mucosa in the antrum. Biopsied.                          - Congested duodenal mucosa. Biopsied.   Recommendation:        - [facility].                          - Advance diet as tolerated.                          - Continue present medications.                          - Use Protonix (pantoprazole) 40 mg PO BID.                          - Scheduled anti-emetics to prevent retching.                          - Since patient is immunocompromsied and having                          diarrhea, agree with stool studies (ova/parasite,                          Giardia/cryptosporidium, calprotectin).                          - Outpatient  colonoscopy.                          - Await pathology results.   Attending Participation:        I was present and participated during the entire procedure,        including non-naqvi portions.   Kg Cleaning MD   9/18/2023 12:24:43 PM

## 2023-09-19 NOTE — ASSESSMENT & PLAN NOTE
"- PCR not detected.  - CT abdomen with "hepatomegaly with geographic areas of hepatic steatosis and areas of relative fatty sparing."  - will refer to hepatology    "

## 2023-09-19 NOTE — ANESTHESIA POSTPROCEDURE EVALUATION
Anesthesia Post Evaluation    Patient: Tasia Cardona    Procedure(s) Performed: Procedure(s) (LRB):  EGD (ESOPHAGOGASTRODUODENOSCOPY) (N/A)    Final Anesthesia Type: general      Patient location during evaluation: PACU  Patient participation: Yes- Able to Participate  Level of consciousness: awake and alert  Post-procedure vital signs: reviewed and stable  Pain management: adequate  Airway patency: patent    PONV status at discharge: No PONV  Anesthetic complications: no      Cardiovascular status: hemodynamically stable  Respiratory status: spontaneous ventilation  Follow-up not needed.          Vitals Value Taken Time   /74 09/19/23 1156   Temp 36.5 °C (97.7 °F) 09/19/23 1156   Pulse 71 09/19/23 1156   Resp 16 09/19/23 1156   SpO2 99 % 09/19/23 1156         Event Time   Out of Recovery 18:32:25         Pain/Mayo Score: Pain Rating Prior to Med Admin: 10 (9/19/2023  8:20 AM)  Pain Rating Post Med Admin: 8 (9/19/2023  8:50 AM)  Mayo Score: 10 (9/18/2023 12:45 PM)

## 2023-09-19 NOTE — NURSING
Nurses Note -- 4 Eyes      9/19/2023   3:03 AM      Skin assessed during: Admit      [x] No Altered Skin Integrity Present    []Prevention Measures Documented      [] Yes- Altered Skin Integrity Present or Discovered   [] LDA Added if Not in Epic (Describe Wound)   [] New Altered Skin Integrity was Present on Admit and Documented in LDA   [] Wound Image Taken    Wound Care Consulted? No    Attending Nurse:  Adam Kim RN/Staff Member:   Lynne

## 2023-09-19 NOTE — SUBJECTIVE & OBJECTIVE
Interval History: New onset epigastric pain this morning, severe. Patient reports pain started yesterday after the procedure but became severe this morning. EGD yesterday without complication during procedure. Unable to tolerate PO d/t frequent bilious emesis. IV antiemetics scheduled. IV pain control, will wean as tolerated. Check lipase. F/u GI recs. Monitor OTC with EKG.    Review of Systems   Constitutional:  Positive for chills. Negative for fatigue, fever and unexpected weight change.   HENT:  Positive for sore throat. Negative for drooling, mouth sores and sinus pressure.         +Left sided jaw pain   Eyes:  Negative for photophobia and visual disturbance.   Respiratory:  Negative for cough, shortness of breath and wheezing.    Cardiovascular:  Negative for chest pain, palpitations and leg swelling.   Gastrointestinal:  Positive for abdominal pain (new epigastric pain, severe), diarrhea and nausea. Negative for abdominal distention, blood in stool and vomiting.   Genitourinary:  Negative for dysuria, frequency and hematuria.   Musculoskeletal:  Negative for back pain, gait problem and myalgias.   Skin:  Negative for color change, rash and wound.   Neurological:  Positive for weakness (generalized). Negative for dizziness, syncope and light-headedness.   Psychiatric/Behavioral:  Negative for confusion and hallucinations. The patient is not nervous/anxious.      Objective:     Vital Signs (Most Recent):  Temp: 97 °F (36.1 °C) (09/19/23 0833)  Pulse: (!) 57 (09/19/23 0935)  Resp: 18 (09/19/23 0833)  BP: (!) 157/79 (09/19/23 0833)  SpO2: 100 % (09/19/23 0935) Vital Signs (24h Range):  Temp:  [97 °F (36.1 °C)-98.9 °F (37.2 °C)] 97 °F (36.1 °C)  Pulse:  [43-76] 57  Resp:  [10-35] 18  SpO2:  [96 %-100 %] 100 %  BP: (108-169)/(62-94) 157/79     Weight: 75.3 kg (166 lb)  Body mass index is 23.82 kg/m².    Intake/Output Summary (Last 24 hours) at 9/19/2023 1120  Last data filed at 9/18/2023 1214  Gross per 24 hour    Intake 500 ml   Output --   Net 500 ml         Physical Exam  Constitutional:       General: He is in acute distress (moderate distress 2/2 pain and retching).      Appearance: He is ill-appearing. He is not toxic-appearing or diaphoretic.   HENT:      Head: Normocephalic and atraumatic.      Jaw: Tenderness present.      Comments: Left side TTP     Nose: Nose normal.      Mouth/Throat:      Mouth: Mucous membranes are moist.      Pharynx: Oropharynx is clear.   Eyes:      Extraocular Movements: Extraocular movements intact.      Pupils: Pupils are equal, round, and reactive to light.   Neck:      Trachea: Trachea normal.   Cardiovascular:      Rate and Rhythm: Regular rhythm. Bradycardia present.      Pulses: Normal pulses.   Pulmonary:      Effort: Pulmonary effort is normal. No respiratory distress.      Breath sounds: Normal breath sounds. No wheezing, rhonchi or rales.   Abdominal:      General: Abdomen is flat. Bowel sounds are increased. There is no distension.      Palpations: Abdomen is soft.      Tenderness: There is abdominal tenderness in the right upper quadrant and epigastric area. There is no guarding.      Comments: Severe tenderness with light palpation in epigastric region   Musculoskeletal:         General: Normal range of motion.      Cervical back: Normal range of motion.      Right lower leg: No edema.      Left lower leg: No edema.   Skin:     General: Skin is warm and dry.      Capillary Refill: Capillary refill takes less than 2 seconds.   Neurological:      General: No focal deficit present.      Mental Status: He is alert and oriented to person, place, and time.      Cranial Nerves: Cranial nerves 2-12 are intact.      Sensory: Sensation is intact.      Motor: Motor function is intact. No weakness.   Psychiatric:         Mood and Affect: Mood and affect normal.         Speech: Speech normal.         Behavior: Behavior normal. Behavior is cooperative.         Cognition and Memory:  Cognition normal.         Judgment: Judgment normal.             Significant Labs: All pertinent labs within the past 24 hours have been reviewed.  CBC:   Recent Labs   Lab 09/18/23  1300 09/18/23  1854 09/19/23  0636   WBC 7.45 8.96 10.15   HGB 12.7* 12.4* 12.5*   HCT 40.3 38.7* 39.4*    259 253     CMP:   Recent Labs   Lab 09/17/23  2036 09/18/23  0356 09/19/23  0636    139 138   K 4.0 3.2* 3.3*    106 104   CO2 20* 20* 23   GLU 84 85 96   BUN 9 8 5*   CREATININE 0.7 0.7 0.7   CALCIUM 8.5* 7.9* 8.5*   PROT 6.7 5.4* 6.1   ALBUMIN 3.4* 2.9* 3.2*   BILITOT 0.3 0.5 1.0   ALKPHOS 101 84 104   AST 63* 42* 34   ALT 44 35 28   ANIONGAP 16 13 11       Significant Imaging: I have reviewed all pertinent imaging results/findings within the past 24 hours.

## 2023-09-19 NOTE — PLAN OF CARE
[facility] - [facility]  Initial Discharge Assessment       Primary Care Provider: No, Primary Doctor    Admission Diagnosis: SOB (shortness of breath) [R06.02]  Hematemesis with nausea [K92.0]  HIV infection, unspecified symptom status [B20]    Admission Date: 9/17/2023  Expected Discharge Date: 9/19/2023    Transition of Care Barriers: (P) None    Payor: MEDICAID / Plan: [facility] COMMUNITY PLAN [facility] ([place] MEDICAID) / Product Type: Managed Medicaid /     Extended Emergency Contact Information  Primary Emergency Contact: Denice Cardona  Mobile Phone: [phone]  Relation: Mother  Preferred language: English   needed? No    Discharge Plan A: (P) Home  Discharge Plan B: (P) Home with family      [id number] - [place], [place] - [address] AT [facility] [address]  [address]  [facility] [address]  Phone: [phone] Fax: [phone]    [facility] [place]  [place] - [address]  [address] [place] [address]  Phone: [phone] Fax: [phone]      Initial Assessment (most recent)       Adult Discharge Assessment - 09/18/23 1939          Discharge Assessment    Assessment Type Discharge Planning Assessment (P)      Confirmed/corrected address, phone number and insurance Yes (P)      Confirmed Demographics Correct on Facesheet (P)      Source of Information patient (P)      Does patient/caregiver understand observation status Yes (P)      Communicated CASTILLO with patient/caregiver Yes (P)      Reason For Admission Shortness of Breath (P)      People in Home alone (P)      Do you expect to return to your current living situation? Yes (P)      Do you have help at home or someone to help you manage your care at home? No (P)      Prior to hospitilization cognitive status: Alert/Oriented (P)      Current cognitive status: Alert/Oriented (P)      Equipment Currently Used at Home none (P)      Readmission within 30 days? No  (P)      Patient currently being followed by outpatient case management? No (P)      Do you currently have service(s) that help you manage your care at home? No (P)      Do you take prescription medications? No (P)      Do you have prescription coverage? No (P)      Who is going to help you get home at discharge? Self. (P)      How do you get to doctors appointments? other (see comments) (P)    [facility]    Are you on dialysis? No (P)      Do you take coumadin? No (P)      DME Needed Upon Discharge  none (P)      Discharge Plan discussed with: Patient (P)      Transition of Care Barriers None (P)      Discharge Plan A Home (P)      Discharge Plan B Home with family (P)         Physical Activity    On average, how many days per week do you engage in moderate to strenuous exercise (like a brisk walk)? 0 days (P)      On average, how many minutes do you engage in exercise at this level? 0 min (P)         Financial Resource Strain    How hard is it for you to pay for the very basics like food, housing, medical care, and heating? Not hard at all (P)         Housing Stability    In the last 12 months, was there a time when you were not able to pay the mortgage or rent on time? No (P)      In the last 12 months, how many places have you lived? 1 (P)      In the last 12 months, was there a time when you did not have a steady place to sleep or slept in a shelter (including now)? No (P)         Transportation Needs    In the past 12 months, has lack of transportation kept you from medical appointments or from getting medications? No (P)      In the past 12 months, has lack of transportation kept you from meetings, work, or from getting things needed for daily living? No (P)         Food Insecurity    Within the past 12 months, you worried that your food would run out before you got the money to buy more. Never true (P)      Within the past 12 months, the food you bought just didn't last and you didn't have money to get more. Never  true (P)         Stress    Do you feel stress - tense, restless, nervous, or anxious, or unable to sleep at night because your mind is troubled all the time - these days? To some extent (P)         Social Connections    In a typical week, how many times do you talk on the phone with family, friends, or neighbors? More than three times a week (P)      How often do you get together with friends or relatives? Once a week (P)      How often do you attend Anabaptism or Denominational services? Never (P)      Do you belong to any clubs or organizations such as Anabaptism groups, unions, fraternal or athletic groups, or school groups? No (P)      How often do you attend meetings of the clubs or organizations you belong to? Never (P)      Are you , , , , never , or living with a partner? Never  (P)         Alcohol Use    Q1: How often do you have a drink containing alcohol? Monthly or less (P)      Q2: How many drinks containing alcohol do you have on a typical day when you are drinking? 1 or 2 (P)      Q3: How often do you have six or more drinks on one occasion? Never (P)                    SW met with pt at bedside to complete Initial Discharge Planning Assessment. Pt. Is single and lives alone in his apartment in [place], [place]. No stairs, 1 step to enter apartment. No DME, Home Health, Dialysis or Coumadin. Pt. states he does not drive, [facility] transportation. Pt plans to [facility] or contact friends/family if needed upon discharge.     Yuliana Triana LMSW

## 2023-09-19 NOTE — ASSESSMENT & PLAN NOTE
Intermittent, chronic.   Collect stool studies given immunocompromised state\   --> no BM's since admission. Minimal PO intake.

## 2023-09-19 NOTE — PLAN OF CARE
Problem: Adult Inpatient Plan of Care  Goal: Plan of Care Review  Outcome: Ongoing, Progressing  Goal: Patient-Specific Goal (Individualized)  Outcome: Ongoing, Progressing  Goal: Absence of Hospital-Acquired Illness or Injury  Outcome: Ongoing, Progressing  Goal: Optimal Comfort and Wellbeing  Outcome: Ongoing, Progressing  Goal: Readiness for Transition of Care  Outcome: Ongoing, Progressing     Problem: Adjustment to Illness (Gastrointestinal Bleeding)  Goal: Optimal Coping with Acute Illness  Outcome: Ongoing, Progressing     Problem: Bleeding (Gastrointestinal Bleeding)  Goal: Hemostasis  Outcome: Ongoing, Progressing     Problem: Infection  Goal: Absence of Infection Signs and Symptoms  Outcome: Ongoing, Progressing     Problem: HIV/AIDS Infection  Goal: HIV/AIDS Symptom Control  Outcome: Ongoing, Progressing     Problem: Anemia  Goal: Anemia Symptom Improvement  Outcome: Ongoing, Progressing     Problem: Pain Acute  Goal: Acceptable Pain Control and Functional Ability  Outcome: Ongoing, Progressing

## 2023-09-19 NOTE — ASSESSMENT & PLAN NOTE
Epigastric TTP on arrival, but tenderness primarily RUQ. EGD 9/18. On 9/19, pain in epigastric region now severe. Requiring IV pain meds. Not tolerating PO, frequent emesis.   - F/u lab studies  - Liver enzymes wnl. Lipase pending.   - CT with possible biliary sludge but no evidence of GB inflammation  - IV dilaudid prn, wean as tolerated   - f/u GI recs

## 2023-09-19 NOTE — ASSESSMENT & PLAN NOTE
Pt presented w/acute on chronic hematemesis, usually preceeded by normal vomiting before quickly progressing to BRB that resolves spontaneously, w/ intermittent NSAID use concerning for recurrent brandy hitchcock tear vs. esophagitis. Pt w/ undetectable viral load/compliance w/ biktarvy. EGD showing erythematous gastric antrum mucosa, and duodenal mucosa congestion - bx taken - and brandy hitchcock tear - management consists of supportive care while the lesion heals. Patient w/ ongoing severe epigastric abdominal pain, that is worse when leaning forward following the procedure. Pain likely related to ongoing brandy hitchcock tear. Still with epigastric pain and episodes of NB bilious vomiting this AM. Zofran, Morphine and 1x dose of Phenergan ordered to help treat patients acute symptoms.    - Can consider GI cocktail to help with acute pain  - Antiemetics to prevent retching.   - [facility] to [facility] for [facility] care.   - Advance diet as tolerated.   - Continue present medications.   - Use Protonix (pantoprazole) 40 mg PO BID.   - Scheduled anti-emetics to prevent retching.  - Since patient is immunocompromsied and having diarrhea, agree with stool studies (ova/parasite, Giardia/cryptosporidium, calprotectin).     - Outpatient colonoscopy.   - Await pathology results.

## 2023-09-20 PROBLEM — D73.5 SPLENIC INFARCT: Status: ACTIVE | Noted: 2023-09-20

## 2023-09-20 PROBLEM — I82.890 SPLENIC VEIN THROMBOSIS: Status: ACTIVE | Noted: 2023-09-20

## 2023-09-20 PROBLEM — K85.91 ACUTE NECROTIZING PANCREATITIS: Status: ACTIVE | Noted: 2023-09-20

## 2023-09-20 LAB
ALBUMIN SERPL BCP-MCNC: 3.1 G/DL (ref 3.5–5.2)
ALP SERPL-CCNC: 111 U/L (ref 55–135)
ALT SERPL W/O P-5'-P-CCNC: 26 U/L (ref 10–44)
ANION GAP SERPL CALC-SCNC: 10 MMOL/L (ref 8–16)
AST SERPL-CCNC: 72 U/L (ref 10–40)
BASOPHILS # BLD AUTO: 0.02 K/UL (ref 0–0.2)
BASOPHILS NFR BLD: 0.2 % (ref 0–1.9)
BILIRUB SERPL-MCNC: 1.1 MG/DL (ref 0.1–1)
BUN SERPL-MCNC: 6 MG/DL (ref 6–20)
CALCIUM SERPL-MCNC: 8.7 MG/DL (ref 8.7–10.5)
CHLORIDE SERPL-SCNC: 100 MMOL/L (ref 95–110)
CO2 SERPL-SCNC: 26 MMOL/L (ref 23–29)
CREAT SERPL-MCNC: 0.7 MG/DL (ref 0.5–1.4)
DIFFERENTIAL METHOD: ABNORMAL
EOSINOPHIL # BLD AUTO: 0 K/UL (ref 0–0.5)
EOSINOPHIL NFR BLD: 0 % (ref 0–8)
ERYTHROCYTE [DISTWIDTH] IN BLOOD BY AUTOMATED COUNT: 19.1 % (ref 11.5–14.5)
EST. GFR  (NO RACE VARIABLE): >60 ML/MIN/1.73 M^2
GLUCOSE SERPL-MCNC: 119 MG/DL (ref 70–110)
HCT VFR BLD AUTO: 39.6 % (ref 40–54)
HGB BLD-MCNC: 12.8 G/DL (ref 14–18)
IMM GRANULOCYTES # BLD AUTO: 0.05 K/UL (ref 0–0.04)
IMM GRANULOCYTES NFR BLD AUTO: 0.4 % (ref 0–0.5)
LYMPHOCYTES # BLD AUTO: 1 K/UL (ref 1–4.8)
LYMPHOCYTES NFR BLD: 8.2 % (ref 18–48)
MCH RBC QN AUTO: 29.6 PG (ref 27–31)
MCHC RBC AUTO-ENTMCNC: 32.3 G/DL (ref 32–36)
MCV RBC AUTO: 92 FL (ref 82–98)
MONOCYTES # BLD AUTO: 1.5 K/UL (ref 0.3–1)
MONOCYTES NFR BLD: 12.2 % (ref 4–15)
NEUTROPHILS # BLD AUTO: 9.8 K/UL (ref 1.8–7.7)
NEUTROPHILS NFR BLD: 79 % (ref 38–73)
NRBC BLD-RTO: 0 /100 WBC
PLATELET # BLD AUTO: 171 K/UL (ref 150–450)
PMV BLD AUTO: 10.4 FL (ref 9.2–12.9)
POTASSIUM SERPL-SCNC: 3.8 MMOL/L (ref 3.5–5.1)
PROT SERPL-MCNC: 6 G/DL (ref 6–8.4)
RBC # BLD AUTO: 4.32 M/UL (ref 4.6–6.2)
SODIUM SERPL-SCNC: 136 MMOL/L (ref 136–145)
WBC # BLD AUTO: 12.34 K/UL (ref 3.9–12.7)

## 2023-09-20 PROCEDURE — 63600175 PHARM REV CODE 636 W HCPCS

## 2023-09-20 PROCEDURE — 99232 SBSQ HOSP IP/OBS MODERATE 35: CPT | Mod: ,,,

## 2023-09-20 PROCEDURE — 25000003 PHARM REV CODE 250: Performed by: NURSE PRACTITIONER

## 2023-09-20 PROCEDURE — 99232 PR SUBSEQUENT HOSPITAL CARE,LEVL II: ICD-10-PCS | Mod: ,,,

## 2023-09-20 PROCEDURE — 99233 SBSQ HOSP IP/OBS HIGH 50: CPT | Mod: ,,,

## 2023-09-20 PROCEDURE — 86361 T CELL ABSOLUTE COUNT: CPT

## 2023-09-20 PROCEDURE — 25000003 PHARM REV CODE 250

## 2023-09-20 PROCEDURE — 21400001 HC TELEMETRY ROOM

## 2023-09-20 PROCEDURE — 25500020 PHARM REV CODE 255: Performed by: INTERNAL MEDICINE

## 2023-09-20 PROCEDURE — 99233 PR SUBSEQUENT HOSPITAL CARE,LEVL III: ICD-10-PCS | Mod: ,,,

## 2023-09-20 PROCEDURE — 85025 COMPLETE CBC W/AUTO DIFF WBC: CPT | Performed by: NURSE PRACTITIONER

## 2023-09-20 PROCEDURE — 27000207 HC ISOLATION

## 2023-09-20 PROCEDURE — 94761 N-INVAS EAR/PLS OXIMETRY MLT: CPT

## 2023-09-20 PROCEDURE — C9113 INJ PANTOPRAZOLE SODIUM, VIA: HCPCS

## 2023-09-20 PROCEDURE — 96372 THER/PROPH/DIAG INJ SC/IM: CPT | Mod: 59

## 2023-09-20 PROCEDURE — 80053 COMPREHEN METABOLIC PANEL: CPT | Performed by: NURSE PRACTITIONER

## 2023-09-20 PROCEDURE — 36415 COLL VENOUS BLD VENIPUNCTURE: CPT | Performed by: NURSE PRACTITIONER

## 2023-09-20 RX ORDER — SODIUM CHLORIDE, SODIUM LACTATE, POTASSIUM CHLORIDE, CALCIUM CHLORIDE 600; 310; 30; 20 MG/100ML; MG/100ML; MG/100ML; MG/100ML
INJECTION, SOLUTION INTRAVENOUS CONTINUOUS
Status: ACTIVE | OUTPATIENT
Start: 2023-09-20 | End: 2023-09-20

## 2023-09-20 RX ORDER — SODIUM CHLORIDE, SODIUM LACTATE, POTASSIUM CHLORIDE, CALCIUM CHLORIDE 600; 310; 30; 20 MG/100ML; MG/100ML; MG/100ML; MG/100ML
INJECTION, SOLUTION INTRAVENOUS CONTINUOUS
Status: DISCONTINUED | OUTPATIENT
Start: 2023-09-20 | End: 2023-09-20

## 2023-09-20 RX ORDER — SENNOSIDES 8.6 MG/1
8.6 TABLET ORAL DAILY PRN
Status: DISCONTINUED | OUTPATIENT
Start: 2023-09-20 | End: 2023-09-25 | Stop reason: HOSPADM

## 2023-09-20 RX ORDER — HYDROMORPHONE HYDROCHLORIDE 1 MG/ML
1 INJECTION, SOLUTION INTRAMUSCULAR; INTRAVENOUS; SUBCUTANEOUS
Status: DISCONTINUED | OUTPATIENT
Start: 2023-09-20 | End: 2023-09-23

## 2023-09-20 RX ORDER — HYDROMORPHONE HYDROCHLORIDE 1 MG/ML
0.5 INJECTION, SOLUTION INTRAMUSCULAR; INTRAVENOUS; SUBCUTANEOUS
Status: DISCONTINUED | OUTPATIENT
Start: 2023-09-20 | End: 2023-09-20

## 2023-09-20 RX ORDER — SODIUM CHLORIDE, SODIUM LACTATE, POTASSIUM CHLORIDE, CALCIUM CHLORIDE 600; 310; 30; 20 MG/100ML; MG/100ML; MG/100ML; MG/100ML
INJECTION, SOLUTION INTRAVENOUS CONTINUOUS
Status: ACTIVE | OUTPATIENT
Start: 2023-09-20 | End: 2023-09-21

## 2023-09-20 RX ORDER — HYDROMORPHONE HYDROCHLORIDE 1 MG/ML
0.5 INJECTION, SOLUTION INTRAMUSCULAR; INTRAVENOUS; SUBCUTANEOUS
Status: DISCONTINUED | OUTPATIENT
Start: 2023-09-20 | End: 2023-09-23

## 2023-09-20 RX ORDER — HYDROMORPHONE HYDROCHLORIDE 1 MG/ML
1 INJECTION, SOLUTION INTRAMUSCULAR; INTRAVENOUS; SUBCUTANEOUS
Status: DISCONTINUED | OUTPATIENT
Start: 2023-09-20 | End: 2023-09-20

## 2023-09-20 RX ADMIN — PANTOPRAZOLE SODIUM 40 MG: 40 INJECTION, POWDER, FOR SOLUTION INTRAVENOUS at 10:09

## 2023-09-20 RX ADMIN — HYDROMORPHONE HYDROCHLORIDE 1 MG: 1 INJECTION, SOLUTION INTRAMUSCULAR; INTRAVENOUS; SUBCUTANEOUS at 12:09

## 2023-09-20 RX ADMIN — SODIUM CHLORIDE 1000 ML: 9 INJECTION, SOLUTION INTRAVENOUS at 10:09

## 2023-09-20 RX ADMIN — HYDROMORPHONE HYDROCHLORIDE 1 MG: 1 INJECTION, SOLUTION INTRAMUSCULAR; INTRAVENOUS; SUBCUTANEOUS at 10:09

## 2023-09-20 RX ADMIN — PROCHLORPERAZINE EDISYLATE 5 MG: 5 INJECTION INTRAMUSCULAR; INTRAVENOUS at 12:09

## 2023-09-20 RX ADMIN — SODIUM CHLORIDE, POTASSIUM CHLORIDE, SODIUM LACTATE AND CALCIUM CHLORIDE: 600; 310; 30; 20 INJECTION, SOLUTION INTRAVENOUS at 04:09

## 2023-09-20 RX ADMIN — PROMETHAZINE HYDROCHLORIDE 12.5 MG: 25 INJECTION INTRAMUSCULAR; INTRAVENOUS at 10:09

## 2023-09-20 RX ADMIN — SODIUM CHLORIDE, POTASSIUM CHLORIDE, SODIUM LACTATE AND CALCIUM CHLORIDE: 600; 310; 30; 20 INJECTION, SOLUTION INTRAVENOUS at 01:09

## 2023-09-20 RX ADMIN — PROCHLORPERAZINE EDISYLATE 5 MG: 5 INJECTION INTRAMUSCULAR; INTRAVENOUS at 06:09

## 2023-09-20 RX ADMIN — HYDROMORPHONE HYDROCHLORIDE 1 MG: 1 INJECTION, SOLUTION INTRAMUSCULAR; INTRAVENOUS; SUBCUTANEOUS at 08:09

## 2023-09-20 RX ADMIN — BICTEGRAVIR SODIUM, EMTRICITABINE, AND TENOFOVIR ALAFENAMIDE FUMARATE 1 TABLET: 50; 200; 25 TABLET ORAL at 04:09

## 2023-09-20 RX ADMIN — HYDROMORPHONE HYDROCHLORIDE 1 MG: 1 INJECTION, SOLUTION INTRAMUSCULAR; INTRAVENOUS; SUBCUTANEOUS at 01:09

## 2023-09-20 RX ADMIN — SODIUM CHLORIDE, POTASSIUM CHLORIDE, SODIUM LACTATE AND CALCIUM CHLORIDE: 600; 310; 30; 20 INJECTION, SOLUTION INTRAVENOUS at 10:09

## 2023-09-20 RX ADMIN — HYDROMORPHONE HYDROCHLORIDE 1 MG: 1 INJECTION, SOLUTION INTRAMUSCULAR; INTRAVENOUS; SUBCUTANEOUS at 04:09

## 2023-09-20 RX ADMIN — HYDROMORPHONE HYDROCHLORIDE 1 MG: 1 INJECTION, SOLUTION INTRAMUSCULAR; INTRAVENOUS; SUBCUTANEOUS at 07:09

## 2023-09-20 RX ADMIN — IOHEXOL 75 ML: 350 INJECTION, SOLUTION INTRAVENOUS at 09:09

## 2023-09-20 RX ADMIN — FOLIC ACID 1 MG: 1 TABLET ORAL at 10:09

## 2023-09-20 RX ADMIN — PROMETHAZINE HYDROCHLORIDE 12.5 MG: 25 INJECTION INTRAMUSCULAR; INTRAVENOUS at 05:09

## 2023-09-20 RX ADMIN — PANTOPRAZOLE SODIUM 40 MG: 40 INJECTION, POWDER, FOR SOLUTION INTRAVENOUS at 08:09

## 2023-09-20 RX ADMIN — PROMETHAZINE HYDROCHLORIDE 12.5 MG: 25 INJECTION INTRAMUSCULAR; INTRAVENOUS at 04:09

## 2023-09-20 NOTE — ASSESSMENT & PLAN NOTE
Follows with Dr. Daniels. Will message to arrange for outpatient follow up.  Patient noted to have mild normocytic anemia likely due to folate deficiency. Agree with continuing folic acid supplementation.

## 2023-09-20 NOTE — PLAN OF CARE
[facility] - [facility]  Discharge Reassessment    Primary Care Provider: Brittany, Primary Doctor    Expected Discharge Date: 9/21/2023    Reassessment (most recent)       Discharge Reassessment - 09/20/23 1500          Discharge Reassessment    Assessment Type Discharge Planning Brief Assessment     Did the patient's condition or plan change since previous assessment? No     Discharge Plan discussed with: Patient     Communicated CASTILLO with patient/caregiver Date not available/Unable to determine     Discharge Plan A Home     Discharge Plan B Home with family     DME Needed Upon Discharge  none     Transition of Care Barriers None     Why the patient remains in the hospital Requires continued medical care        Post-Acute Status    Discharge Delays None known at this time                  Continued severe epigastric pain, uncontrolled on IV dilaudid q4 hours prn. Increase to q2h dosing. CLD. Aggressive IVF for acute necrotizing pancreatitis. Will need to consider AC for acute splenic vein thrombosis. Pending AES and heme recs. Pt endorses generalized abdominal pain, worst in epigastric region. Nausea improved, no emesis.  Will continue to update plan as needed.  Abbe Capone, RN,BSN

## 2023-09-20 NOTE — PROGRESS NOTES
[facility]  Gastroenterology  Progress Note    Patient Name: Tasia Cardona  MRN: [id number]  Admission Date: 9/17/2023  Hospital Length of Stay: 0 days  Code Status: Full Code   Attending Provider: Srini Ferreira MD  Consulting Provider: Roxy Adams NP  Primary Care Physician: Brittany, Primary Doctor  Principal Problem: Hematemesis    Subjective:     Interval History: Followed up with patient s/p EGD 9/18 with mild gastritis and brandy hitchcock tear. Patient with ongoing upper abdominal pain and now elevated Lipase. STAT CT abdomen with contrast ordered for evaluation of patients pancreas and gallbladder as contributing to his acute onset of pain. He also reports some nausea and NB bilious vomiting. Stool studies ordered for his subjective diarrhea, however patient hasnt had a BM since Saturday.    Review of Systems   Constitutional:  Negative for fatigue and fever.   HENT:  Negative for sore throat.    Respiratory:  Negative for apnea, cough and shortness of breath.    Cardiovascular:  Negative for chest pain, palpitations and leg swelling.   Gastrointestinal:  Positive for abdominal pain, nausea and vomiting. Negative for constipation and diarrhea.   Genitourinary:  Negative for dysuria, frequency and urgency.   Skin:  Negative for rash.     Objective:     Vital Signs (Most Recent):  Temp: 98.1 °F (36.7 °C) (09/20/23 0811)  Pulse: 104 (09/20/23 0811)  Resp: 16 (09/20/23 0811)  BP: 132/89 (09/20/23 0811)  SpO2: 97 % (09/20/23 0811) Vital Signs (24h Range):  Temp:  [97.7 °F (36.5 °C)-98.1 °F (36.7 °C)] 98.1 °F (36.7 °C)  Pulse:  [] 104  Resp:  [16-20] 16  SpO2:  [96 %-100 %] 97 %  BP: (132-153)/(74-94) 132/89     Weight: 75.3 kg (166 lb) (09/18/23 1950)  Body mass index is 23.82 kg/m².      Intake/Output Summary (Last 24 hours) at 9/20/2023 0864  Last data filed at 9/20/2023 0413  Gross per 24 hour   Intake 1 ml   Output --   Net 1 ml         Lines/Drains/Airways       Peripheral Intravenous Line   Duration                  Peripheral IV - Single Lumen 09/19/23 1600 22 G Anterior;Proximal;Right Upper Arm <1 day                     Physical Exam  Constitutional:       General: He is in acute distress.   HENT:      Head: Normocephalic.   Eyes:      General: No scleral icterus.  Abdominal:      General: Abdomen is flat. Bowel sounds are normal. There is no distension.      Palpations: Abdomen is soft. There is no mass.      Tenderness: There is abdominal tenderness in the right upper quadrant and left upper quadrant. There is guarding.   Skin:     General: Skin is warm and dry.      Capillary Refill: Capillary refill takes less than 2 seconds.      Coloration: Skin is not jaundiced or pale.      Findings: No bruising or erythema.   Neurological:      Mental Status: He is alert and oriented to person, place, and time. Mental status is at baseline.          Significant Labs:  CBC:   Recent Labs   Lab 09/18/23  1854 09/19/23  0636 09/20/23  0553   WBC 8.96 10.15 12.34   HGB 12.4* 12.5* 12.8*   HCT 38.7* 39.4* 39.6*    253 171       CMP:   Recent Labs   Lab 09/20/23  0553   *   CALCIUM 8.7   ALBUMIN 3.1*   PROT 6.0      K 3.8   CO2 26      BUN 6   CREATININE 0.7   ALKPHOS 111   ALT 26   AST 72*   BILITOT 1.1*           Significant Imaging:  Imaging results within the past 24 hours have been reviewed.    EGD 9/18/2023  Impression:            - Corinne-Chauhan tear.                          - Erythematous mucosa in the antrum. Biopsied.                          - Congested duodenal mucosa. Biopsied.   Recommendation:        - Return patient to [facility] for ongoing care.                          - Advance diet as tolerated.                          - Continue present medications.                          - Use Protonix (pantoprazole) 40 mg PO BID.                          - Scheduled anti-emetics to prevent retching.                          - Since patient is immunocompromsied and having                           diarrhea, agree with stool studies (ova/parasite,                          Giardia/cryptosporidium, calprotectin).                          - Outpatient colonoscopy.                          - Await pathology results.   Attending Participation:        I was present and participated during the entire procedure,        including non-naqvi portions.   Kg Cleaning MD   9/18/2023 12:24:43 PM     Assessment/Plan:     GI  * Hematemesis  Pt presented w/acute on chronic hematemesis, usually preceeded by normal vomiting before quickly progressing to BRB that resolves spontaneously, w/ intermittent NSAID use concerning for recurrent brandy hitchcock tear vs. esophagitis. Pt w/ undetectable viral load/compliance w/ biktarvy. EGD showing erythematous gastric antrum mucosa, and duodenal mucosa congestion - bx taken - and brandy hitchcock tear - management consists of supportive care while the lesion heals. Patient w/ ongoing severe epigastric abdominal pain    - Continue present medications.   - Use Protonix (pantoprazole) 40 mg PO BID.   - Since patient is immunocompromsied and having diarrhea, agree with stool studies (ova/parasite, Giardia/cryptosporidium, calprotectin).     - Outpatient colonoscopy.   - Await pathology results.     Epigastric pain  -- CT Abdomen with contrast ordered for evaluation of patients pain  -- Would bolus patient with IVF and increase maintenance rate   -- Diet as tolerated  -- Pain control per primary team         Thank you for your consult. I will follow-up with patient. Please contact us if you have any additional questions.    Roxy Adams NP  Gastroenterology  [facility]

## 2023-09-20 NOTE — ASSESSMENT & PLAN NOTE
This patient in known to have HIV+ status (Have detected HIV PCR but never CD4 <200 or AIDS defining illness). Continue to monitor routine labs. Last CD4 count was   Lab Results   Component Value Date    ABSOLUTECD4 1084 10/18/2022       We will not consult Infectious disease at this time. Other HIV-associated diseases are not present.    -Continue Home HIV medication  -repeat CD4   -CMV serology positive   -Hep C Ab pos, PCR neg  -Stool studies if able to collect   - will curbside ID to discuss findings

## 2023-09-20 NOTE — SUBJECTIVE & OBJECTIVE
Oncology Treatment Plan:   [No matching plan found]    Medications:  Continuous Infusions:   lactated ringers 200 mL/hr at 09/20/23 1043     Scheduled Meds:   [id number] ala  1 tablet Oral Daily    folic acid  1 mg Oral Daily    pantoprazole  40 mg Intravenous BID    prochlorperazine  5 mg Intravenous Q6H     PRN Meds:acetaminophen, acetaminophen, albuterol, HYDROmorphone, HYDROmorphone, ondansetron, promethazine (PHENERGAN) 12.5 mg in dextrose 5 % (D5W) 50 mL IVPB, senna     Review of patient's allergies indicates:   Allergen Reactions    [place] Swelling    Pcn [penicillins] Hives    Pecan nut Swelling    Soy     Sulfa (sulfonamide antibiotics) Hives        Past Medical History:   Diagnosis Date    Asthma      Past Surgical History:   Procedure Laterality Date    ESOPHAGOGASTRODUODENOSCOPY N/A 9/18/2023    Procedure: EGD (ESOPHAGOGASTRODUODENOSCOPY);  Surgeon: Kg Cleaning MD;  Location: [address];  Service: Endoscopy;  Laterality: N/A;     Family History       Problem Relation (Age of Onset)    Breast cancer Maternal Grandmother    Cancer Mother, Brother    No Known Problems Father    Ovarian cancer Mother    Pancreatitis Mother          Tobacco Use    Smoking status: Some Days     Types: Cigarettes    Smokeless tobacco: Never   Substance and Sexual Activity    Alcohol use: Yes    Drug use: Never    Sexual activity: Not on file       Review of Systems   Constitutional:  Negative for fatigue and fever.   HENT:  Negative for mouth sores and sinus pressure.    Eyes:  Negative for photophobia and visual disturbance.   Respiratory:  Negative for cough and shortness of breath.    Cardiovascular:  Negative for chest pain, palpitations and leg swelling.   Gastrointestinal:  Positive for abdominal pain and nausea. Negative for abdominal distention, blood in stool, diarrhea and vomiting.   Genitourinary:  Negative for dysuria, frequency and hematuria.   Musculoskeletal:  Negative for back pain,  gait problem and myalgias.   Skin:  Negative for rash and wound.   Neurological:  Negative for dizziness, syncope and light-headedness.   Psychiatric/Behavioral:  Negative for confusion and hallucinations. The patient is not nervous/anxious.      Objective:     Vital Signs (Most Recent):  Temp: 97.6 °F (36.4 °C) (09/20/23 1109)  Pulse: 75 (09/20/23 1109)  Resp: 18 (09/20/23 1347)  BP: 135/87 (09/20/23 1109)  SpO2: 96 % (09/20/23 1109) Vital Signs (24h Range):  Temp:  [97.6 °F (36.4 °C)-98.1 °F (36.7 °C)] 97.6 °F (36.4 °C)  Pulse:  [] 75  Resp:  [16-18] 18  SpO2:  [96 %-100 %] 96 %  BP: (132-153)/(75-94) 135/87     Weight: 75.3 kg (166 lb)  Body mass index is 23.82 kg/m².  Body surface area is 1.93 meters squared.      Intake/Output Summary (Last 24 hours) at 9/20/2023 1539  Last data filed at 9/20/2023 1349  Gross per 24 hour   Intake 401 ml   Output 500 ml   Net -99 ml        Physical Exam  Constitutional:       Appearance: He is not toxic-appearing.   HENT:      Head: Normocephalic and atraumatic.      Nose: Nose normal. No congestion.   Eyes:      General: No scleral icterus.     Extraocular Movements: Extraocular movements intact.   Neck:      Trachea: Trachea normal.   Cardiovascular:      Rate and Rhythm: Normal rate and regular rhythm.   Pulmonary:      Effort: Pulmonary effort is normal. No respiratory distress.   Abdominal:      General: Abdomen is flat. There is no distension.      Palpations: Abdomen is soft.      Tenderness: There is abdominal tenderness. There is no guarding.   Musculoskeletal:         General: Normal range of motion.      Cervical back: Normal range of motion.      Right lower leg: No edema.      Left lower leg: No edema.   Skin:     General: Skin is warm and dry.   Neurological:      Mental Status: He is alert and oriented to person, place, and time.      Cranial Nerves: Cranial nerves 2-12 are intact.      Sensory: Sensation is intact.      Motor: Motor function is intact. No  weakness.   Psychiatric:         Mood and Affect: Mood and affect normal.         Speech: Speech normal.         Behavior: Behavior normal. Behavior is cooperative.         Cognition and Memory: Cognition normal.          Significant Labs:   CBC:   Recent Labs   Lab 09/18/23  1854 09/19/23  0636 09/20/23  0553   WBC 8.96 10.15 12.34   HGB 12.4* 12.5* 12.8*   HCT 38.7* 39.4* 39.6*    253 171    and CMP:   Recent Labs   Lab 09/19/23  0636 09/20/23  0553    136   K 3.3* 3.8    100   CO2 23 26   GLU 96 119*   BUN 5* 6   CREATININE 0.7 0.7   CALCIUM 8.5* 8.7   PROT 6.1 6.0   ALBUMIN 3.2* 3.1*   BILITOT 1.0 1.1*   ALKPHOS 104 111   AST 34 72*   ALT 28 26   ANIONGAP 11 10       Diagnostic Results:  I have reviewed all pertinent imaging results/findings within the past 24 hours.

## 2023-09-20 NOTE — PLAN OF CARE
Problem: Adult Inpatient Plan of Care  Goal: Plan of Care Review  Outcome: Ongoing, Progressing  Goal: Patient-Specific Goal (Individualized)  Outcome: Ongoing, Progressing  Goal: Absence of Hospital-Acquired Illness or Injury  Outcome: Ongoing, Progressing  Goal: Optimal Comfort and Wellbeing  Outcome: Ongoing, Progressing  Goal: Readiness for Transition of Care  Outcome: Ongoing, Progressing     Problem: Adjustment to Illness (Gastrointestinal Bleeding)  Goal: Optimal Coping with Acute Illness  Outcome: Ongoing, Progressing     Problem: Bleeding (Gastrointestinal Bleeding)  Goal: Hemostasis  Outcome: Ongoing, Progressing     Problem: Infection  Goal: Absence of Infection Signs and Symptoms  Outcome: Ongoing, Progressing     Problem: HIV/AIDS Infection  Goal: HIV/AIDS Symptom Control  Outcome: Ongoing, Progressing     Problem: Anemia  Goal: Anemia Symptom Improvement  Outcome: Ongoing, Progressing     Problem: Pain Acute  Goal: Acceptable Pain Control and Functional Ability  Outcome: Ongoing, Progressing     Problem: Bowel Elimination/Continence Impairment  Goal: Effective Bowel Elimination/Continence  Outcome: Ongoing, Progressing

## 2023-09-20 NOTE — ASSESSMENT & PLAN NOTE
Acute necrotizing pancreatitis   Splenic vein thrombosis  Epigastric TTP on arrival, but tenderness primarily RUQ. EGD 9/18. On 9/19, acute pain in epigastric region now severe. Requiring IV pain meds. Not tolerating PO, frequent emesis.   - CT abd on 9/18 with possible biliary sludge but no evidence of GB inflammation  - Liver enzymes wnl. Lipase now 263 (12 on admission)  - repeat CT abd with contrast revealing necrotizing pancreatitis (no fluid collection) and splenic infarction.   - Pain uncontrolled on IV dilaudid q4 hours, increase frequency of dosing to q2 hours.  - Aggressive IVF  - CLD for now, continue scheduled antiemetics   - daily EKG to monitor QTc  - f/u GI recs  - f/u AES recs

## 2023-09-20 NOTE — NURSING
"Abdominal pain to RLQ worsened significantly over night. When palpated pt winces and shivers in pain.Bowel sounds present but hypoactive; no BM since 9/16. PRN dilaudid administered Q4 hrs with little relief of pain. On call hospitalist, Remedios Adhikari ordered STAT KUB this morning. When resulted Remedios came to bedside around 0540 to assess pt. Pain had progressed to LLQ as well    Remedios notified of significantly increase lipase yesterday. States will speak with daytime hospitalist    Nausea/retching uncontrolled by PRN and scheduled meds, states promethazine helps the most. No emesis since 2200. But this morning states he is "scared to sit up because the pain is so bad I feel like I will throw up"    This nurse at pt bedside around 0720. Pt pain worsened within the last hour and has hiccups. Pt was tearful because "with every hiccup the pain is excruciating"    Dayshift nurse notified, at that time she was contacting GI physician  "

## 2023-09-20 NOTE — HPI
Mr. Tasia Cardona is a 23 year old man with HIV, polycythemia who presented on 09/17 with hematemesis. During his hospital stay, GI was consulted and performed EGD on 09/18 that showed a small bleeding Corinne-Chauhan tear with stigmata of recent bleeding. As patient continued with abdominal pain, CT abdomen/pelvis was done on 09/20, which demonstrated necrotizing pancreatitis and occluded splenic vein. Patient reports that he has not an episode of hematemesis since admission. He states nausea and diarrhea are improving. Hematology was consulted regarding anticoagulation for possible splenic vein thrombosis.

## 2023-09-20 NOTE — ASSESSMENT & PLAN NOTE
Patient is a 23 year old with HIV, polycythemia who presented with hematemesis. Hospital course complicated by necrotizing pancreatitis and occlusion of splenic vein, concerning for splenic vein thrombosis.    There is not adequate evidence for treatment of splenic vein thrombosis due to acute pancreatitis. Per imaging, the pancreas, SMV and splenic vein were obscured by bowel gas artifact and not visualized. Occlusion possibly due to inflammation from necrotizing pancreatitis and not due to actual thrombus.    Recommendations:  As patient with recent hematemesis and no clear imaging to indicate thrombus, recommend against anticoagulation for now as risks outweigh benefits. Patient will likely need repeat abdominal imaging in 8-12 weeks to re-assess.

## 2023-09-20 NOTE — SUBJECTIVE & OBJECTIVE
Interval History: Continued severe epigastric pain, uncontrolled on IV dilaudid q4 hours prn. Increase to q2h dosing. CLD. Aggressive IVF for acute necrotizing pancreatitis. Will need to consider AC for acute splenic vein thrombosis. Pending AES and heme recs. Pt endorses generalized abdominal pain, worst in epigastric region. Nausea improved, no emesis.     Review of Systems   Constitutional:  Positive for chills. Negative for fatigue, fever and unexpected weight change.   HENT:  Positive for sore throat. Negative for drooling, mouth sores and sinus pressure.         +Left sided jaw pain   Eyes:  Negative for photophobia and visual disturbance.   Respiratory:  Negative for cough, shortness of breath and wheezing.    Cardiovascular:  Negative for chest pain, palpitations and leg swelling.   Gastrointestinal:  Positive for abdominal pain (new epigastric pain, severe) and nausea. Negative for abdominal distention, blood in stool, diarrhea and vomiting.   Genitourinary:  Negative for dysuria, frequency and hematuria.   Musculoskeletal:  Negative for back pain, gait problem and myalgias.   Skin:  Negative for color change, rash and wound.   Neurological:  Positive for weakness (generalized). Negative for dizziness, syncope and light-headedness.   Psychiatric/Behavioral:  Negative for confusion and hallucinations. The patient is not nervous/anxious.      Objective:     Vital Signs (Most Recent):  Temp: 97.6 °F (36.4 °C) (09/20/23 1109)  Pulse: 75 (09/20/23 1109)  Resp: 16 (09/20/23 1109)  BP: 135/87 (09/20/23 1109)  SpO2: 96 % (09/20/23 1109) Vital Signs (24h Range):  Temp:  [97.6 °F (36.4 °C)-98.1 °F (36.7 °C)] 97.6 °F (36.4 °C)  Pulse:  [] 75  Resp:  [16-18] 16  SpO2:  [96 %-100 %] 96 %  BP: (132-153)/(75-94) 135/87     Weight: 75.3 kg (166 lb)  Body mass index is 23.82 kg/m².    Intake/Output Summary (Last 24 hours) at 9/20/2023 7227  Last data filed at 9/20/2023 5951  Gross per 24 hour   Intake 1 ml   Output --    Net 1 ml         Physical Exam  Constitutional:       General: He is in acute distress (moderate distress 2/2 pain).      Appearance: He is ill-appearing. He is not toxic-appearing or diaphoretic.   HENT:      Head: Normocephalic and atraumatic.      Jaw: Tenderness present.      Comments: Left side TTP     Nose: Nose normal.      Mouth/Throat:      Mouth: Mucous membranes are moist.      Pharynx: Oropharynx is clear.   Eyes:      Extraocular Movements: Extraocular movements intact.      Pupils: Pupils are equal, round, and reactive to light.   Neck:      Trachea: Trachea normal.   Cardiovascular:      Rate and Rhythm: Regular rhythm. Bradycardia present.      Pulses: Normal pulses.   Pulmonary:      Effort: Pulmonary effort is normal. No respiratory distress.      Breath sounds: Normal breath sounds. No wheezing, rhonchi or rales.   Abdominal:      General: Abdomen is flat. Bowel sounds are increased. There is no distension.      Palpations: Abdomen is soft.      Tenderness: There is abdominal tenderness in the right upper quadrant and epigastric area. There is no guarding.      Comments: Diffuse severe tenderness with light palpation in epigastric region   Musculoskeletal:         General: Normal range of motion.      Cervical back: Normal range of motion.      Right lower leg: No edema.      Left lower leg: No edema.   Skin:     General: Skin is warm and dry.      Capillary Refill: Capillary refill takes less than 2 seconds.   Neurological:      General: No focal deficit present.      Mental Status: He is alert and oriented to person, place, and time.      Cranial Nerves: Cranial nerves 2-12 are intact.      Sensory: Sensation is intact.      Motor: Motor function is intact. No weakness.   Psychiatric:         Mood and Affect: Mood and affect normal.         Speech: Speech normal.         Behavior: Behavior normal. Behavior is cooperative.         Cognition and Memory: Cognition normal.         Judgment:  Judgment normal.             Significant Labs: All pertinent labs within the past 24 hours have been reviewed.    Significant Imaging: I have reviewed all pertinent imaging results/findings within the past 24 hours.    Imaging Results              CT Maxillofacial Without Contrast (Final result)  Result time 09/18/23 02:55:38      Final result by Reyes Gary MD (09/18/23 02:55:38)                   Impression:      No acute abnormality.    Scattered dental caries.    Electronically signed by resident: Bennie Meade  Date:    09/18/2023  Time:    02:28    Electronically signed by: Reyes Gary MD  Date:    09/18/2023  Time:    02:55               Narrative:    EXAMINATION:  CT MAXILLOFACIAL WITHOUT CONTRAST    CLINICAL HISTORY:  Maxillofacial pain;    TECHNIQUE:  Low dose axial images, sagittal and coronal reformations were obtained through the face.  Contrast was not administered.    COMPARISON:  CT 12/16/2022.    FINDINGS:  Bony orbits, nasal bones, zygomatic arches, pterygoid plates, maxilla and mandible are intact.  No facial bone fracture.  Temporomandibular joints are aligned.  Few small dental caries.    Minimal mucosal thickening in the ethmoid air cells and inferior maxillary sinuses.  Remainder of the paranasal sinuses are otherwise essentially clear.  No air-fluid levels to suggest acute sinusitis.  Mastoid air cells are aerated.    Optic globes, optic nerves, and extraocular muscles are unremarkable.  No infiltration of retrobulbar fat.  Subcentimeter cervical chain lymph nodes, many of which have decreased in size when compared with prior maxillofacial CT.    Salivary glands are unremarkable.                                       CT Abdomen Pelvis  Without Contrast (Final result)  Result time 09/18/23 02:46:30      Final result by Reyes Gary MD (09/18/23 02:46:30)                   Impression:      1. Hepatomegaly with geographic areas of hepatic steatosis and areas of relative  "fatty sparing.  2. Possible biliary sludge.  3. No acute abnormality identified on this noncontrast study.    Electronically signed by resident: Bennie Meade  Date:    09/18/2023  Time:    02:06    Electronically signed by: Reyes Gary MD  Date:    09/18/2023  Time:    02:46               Narrative:    EXAMINATION:  CT ABDOMEN PELVIS WITHOUT CONTRAST    CLINICAL HISTORY:  Nausea/vomiting;    TECHNIQUE:  Low dose axial images, sagittal and coronal reformations were obtained from the lung bases to the pubic symphysis without intravenous contrast.   Oral contrast was not administered.    COMPARISON:  CT 12/07/2021.    FINDINGS:  LUNG BASES & MEDIASTINUM (limited):  Heart is normal in size.  No pericardial effusion.  No pleural effusion.  No focal consolidation.    HEPATOBILIARY: Liver is enlarged measuring 21 cm craniocaudal.  Geographic regions of hepatic steatosis involving the left hepatic lobe and anterior aspect of the right hepatic lobe, with geographic areas of relative fatty sparing in the posterior right hepatic lobe.  However, the areas of "fatty sparing" in the posterior right hepatic lobe demonstrate density of approximately 30 HU which still meets criteria for hepatic steatosis.  Areas of more pronounced steatosis in the anterior liver measure approximately -1 HU.  No focal hepatic lesions.  No biliary ductal dilatation.  Possible biliary sludge in the gallbladder.  No calcified gallstones.  No gallbladder wall thickening or pericholecystic inflammatory fat stranding.    SPLEEN:  No splenomegaly.    PANCREAS:  No focal masses or ductal dilatation.    ADRENALS:  No adrenal nodules.    KIDNEYS/URETERS:  No hydronephrosis, stones or solid mass lesions. Ureters are unremarkable.    PELVIC ORGANS/BLADDER:  Bladder is moderately distended without focal wall thickening.  Prostate is unremarkable.    PERITONEUM / RETROPERITONEUM:  No free air. No free fluid.    LYMPH NODES:  No " "lymphadenopathy.    VESSELS:  No significant atherosclerosis or aneurysm.  Retroaortic left renal vein, anatomic variant.    GI TRACT: Distal esophagus and stomach are unremarkable.  Small bowel is normal in caliber without inflammation or obstruction.  Normal appendix.  No colonic distension or wall thickening.    BONES AND SOFT TISSUES: Soft tissues are unremarkable.  No fractures or focal osseous lesions.                                       X-Ray Chest AP Portable (Final result)  Result time 09/17/23 21:22:05      Final result by Reyes Gary MD (09/17/23 21:22:05)                   Impression:      No acute cardiopulmonary finding identified on this single view.      Electronically signed by: Reyes Gary MD  Date:    09/17/2023  Time:    21:22               Narrative:    EXAMINATION:  XR CHEST AP PORTABLE    CLINICAL HISTORY:  Provided history is "  Shortness of breath".    TECHNIQUE:  One view of the chest.    COMPARISON:  08/02/2022.    FINDINGS:  Cardiac wires overlie the chest.  Cardiomediastinal silhouette is not enlarged.  No focal consolidation.  No sizable pleural effusion.  No pneumothorax.                                      "

## 2023-09-20 NOTE — ASSESSMENT & PLAN NOTE
-- CT Abdomen with contrast ordered for evaluation of patients pain  -- Would bolus patient with IVF and increase maintenance rate   -- Diet as tolerated  -- Pain control per primary team

## 2023-09-20 NOTE — PROGRESS NOTES
"[facility] - Observation [address] [place] [address] Medicine  Progress Note    Patient Name: Tasia Cardona  MRN: [id number]  Patient Class: IP- Inpatient   Admission Date: 9/17/2023  Length of Stay: 0 days  Attending Physician: Srini Ferreira MD  Primary Care Provider: Brittany, Primary Doctor        Subjective:     Principal Problem:Acute necrotizing pancreatitis        HPI:  Tasia Cardona is a 23 y.o. male with a PMHx of HIV, Asthma, and polycythemia vera presenting with a c/o facial pain and hematemesis. The patient states that the vomiting began 4 days ago and was initially mild but quickly progressed to hematemesis. States that he sees bright red blood sometimes clots each time he vomits. Today he is had 5-6 episodes prior to presentation in the emergency department. He feels weak, admits abdominal discomfort and significant nausea. He attempted to take ibuprofen to help with his symptoms however this did not have any benefit he reported. Patient has not been able to tolerate any oral intake for the past 2 days given the amount of nausea. Additionally patient admits significant jaw pain primarily on the left near the TMJ junction. This has began progressively with the episodes of vomiting. He also notes he has been having watery diarrhea with 1-2 episodes of dark "clot-like" bowel movements for several months. He denies any LOC, syncope, or fatigue. Pt was recently seen by colorectal surgery (9/12/23) for similar complaints and was prescribed a PPI and antiemetic medication, as well as given a GI referral for EGD/colonoscopy.    In ED VSS, afebrile. CBC with mild anemia. PT/INR WNL. Bicarb 20. Calcium 8.5. AST 63. Lipase 12. Hepatitis C Ab reactive, hepatitis C quantitative PCR in process. CXR-Cardiomediastinal silhouette is not enlarged. No focal consolidation. No sizable pleural effusion. No pneumothorax. Given 80mg protonix, 4mg morhpine, & 4mg zofran IVP.       Overview/Hospital Course:  Tasia Cardona was placed in  observation " History  Chief Complaint   Patient presents with   • Cough     Mother c/o pt having cough x 3 months  Was dx with RSV 2 months ago  Grandmother gave pt benadryl half of teaspoon 11 pm because "he was gasping for air "  Decrease appetite x 2 days  1year-old nonverbal autistic male presents with his mother for concerns for ongoing cough for the past 3 months  Patient has been seen and treated by Pediatrics multiple times with corticosteroids and antibiotics with limited to no benefit  Patient's mother states cough temporarily improved after corticosteroids but never resolved and has been ongoing and persistent, worsening for the past few days  Patient's mother notes the cough has been ongoing, dry, and hacking  Patient's mother notes previously was treated for croup but denies any barking cough  Patient also tested positive for RSV 2 months ago  Yesterday, patient's mother notes of fever but patient is afebrile upon arrival to the emergency room  Patient's mother also notes decreased urination and oral intake over the past 2 days  Patient's mother states last urination was this morning and patient has been eating and drinking very little  Impression plan:  Multiple symptoms with a broad differential but patient's cough that has been ongoing may represent recurrent infections, particularly considering reported history of fever  Will obtain COVID/influenza/RSV testing  More concerning is patient with decreased urination and oral intake  Will attempt to encourage oral intake and treat patient symptomatically with ibuprofen  Will obtain chest imaging considering ongoing cough but or concern for recurrent infections  Will monitor, reassess, re-evaluate need for additional testing            Cough  Cough characteristics:  Dry  Severity:  Moderate  Onset quality:  Gradual  Timing:  Constant  Progression:  Waxing and waning  Relieved by:  Nothing  Worsened by:  Nothing  Ineffective treatments: Cough suppressants  Behavior:     Behavior:  Normal    Intake amount:  Refusing to eat or drink    Urine output:  Decreased    Last void:  13 to 24 hours ago  Risk factors: no recent travel        None       Past Medical History:   Diagnosis Date   • Autism        History reviewed  No pertinent surgical history  History reviewed  No pertinent family history  I have reviewed and agree with the history as documented  E-Cigarette/Vaping     E-Cigarette/Vaping Substances          Review of Systems   Unable to perform ROS: Patient nonverbal   Respiratory: Positive for cough  Physical Exam  Physical Exam  HENT:      Mouth/Throat:      Mouth: Mucous membranes are moist    Eyes:      General:         Right eye: No discharge  Left eye: No discharge  Conjunctiva/sclera: Conjunctivae normal    Cardiovascular:      Rate and Rhythm: Regular rhythm  Tachycardia present  Pulses: Normal pulses  Pulmonary:      Effort: Pulmonary effort is normal       Breath sounds: Normal breath sounds  Abdominal:      Tenderness: There is no abdominal tenderness  Hernia: No hernia is present  Musculoskeletal:         General: No signs of injury  Skin:     General: Skin is warm and dry  Neurological:      General: No focal deficit present  Mental Status: He is alert  Comments: Nonverbal, at baseline           Vital Signs  ED Triage Vitals   Temperature Pulse Respirations Blood Pressure SpO2   11/22/22 2338 11/22/22 2338 11/22/22 2338 11/22/22 2341 11/22/22 2338   98 2 °F (36 8 °C) (!) 127 22 103/71 96 %      Temp src Heart Rate Source Patient Position - Orthostatic VS BP Location FiO2 (%)   11/22/22 2341 11/22/22 2338 -- -- --   Tympanic Monitor         Pain Score       11/23/22 0016       Med Not Given for Pain - for MAR use only           Vitals:    11/22/22 2338 11/22/22 2341   BP:  103/71   Pulse: (!) 127          Visual Acuity      ED Medications  Medications   ibuprofen (MOTRIN) oral for workup of GI bleeding. Hgb 11.1, below baseline. GI consulted and performed EGD revealing Corinne-Chauhan tear, Erythematous mucosa in the antrum (bx taken), and Congested duodenal mucosa (bx taken). CLD. Antiemetics scheduled to prevent retching. Continue protonix BID (IV while unable to tolerate PO). Pt with new severe epigastric pain on the day following the EGD. IV pain control, making adjustments as needed. Lipase now elevated to 263 (12 on admit). Begin tx for pancreatitis with aggressive IVF. 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 Heme consult for assistance with CMV serology reactive, discussing tx recs with ID. Repeat CD4 pending.  Collect stool studies if able.  DC when medically stable with plan for outpatient colonoscopy and GI f/u. Path results pending.      Interval History: Continued severe epigastric pain, uncontrolled on IV dilaudid q4 hours prn. Increase to q2h dosing. CLD. Aggressive IVF for acute necrotizing pancreatitis. Will need to consider AC for acute splenic vein thrombosis. Pending AES and heme recs. Pt endorses generalized abdominal pain, worst in epigastric region. Nausea improved, no emesis.     Review of Systems   Constitutional:  Positive for chills. Negative for fatigue, fever and unexpected weight change.   HENT:  Positive for sore throat. Negative for drooling, mouth sores and sinus pressure.         +Left sided jaw pain   Eyes:  Negative for photophobia and visual disturbance.   Respiratory:  Negative for cough, shortness of breath and wheezing.    Cardiovascular:  Negative for chest pain, palpitations and leg swelling.   Gastrointestinal:  Positive for abdominal pain (new epigastric pain, severe) and nausea. Negative for abdominal distention, blood in stool, diarrhea and vomiting.   Genitourinary:  Negative for dysuria, frequency and hematuria.   Musculoskeletal:  Negative for back pain, gait problem and  myalgias.   Skin:  Negative for color change, rash and wound.   Neurological:  Positive for weakness (generalized). Negative for dizziness, syncope and light-headedness.   Psychiatric/Behavioral:  Negative for confusion and hallucinations. The patient is not nervous/anxious.      Objective:     Vital Signs (Most Recent):  Temp: 97.6 °F (36.4 °C) (09/20/23 1109)  Pulse: 75 (09/20/23 1109)  Resp: 16 (09/20/23 1109)  BP: 135/87 (09/20/23 1109)  SpO2: 96 % (09/20/23 1109) Vital Signs (24h Range):  Temp:  [97.6 °F (36.4 °C)-98.1 °F (36.7 °C)] 97.6 °F (36.4 °C)  Pulse:  [] 75  Resp:  [16-18] 16  SpO2:  [96 %-100 %] 96 %  BP: (132-153)/(75-94) 135/87     Weight: 75.3 kg (166 lb)  Body mass index is 23.82 kg/m².    Intake/Output Summary (Last 24 hours) at 9/20/2023 1304  Last data filed at 9/20/2023 0413  Gross per 24 hour   Intake 1 ml   Output --   Net 1 ml         Physical Exam  Constitutional:       General: He is in acute distress (moderate distress 2/2 pain).      Appearance: He is ill-appearing. He is not toxic-appearing or diaphoretic.   HENT:      Head: Normocephalic and atraumatic.      Jaw: Tenderness present.      Comments: Left side TTP     Nose: Nose normal.      Mouth/Throat:      Mouth: Mucous membranes are moist.      Pharynx: Oropharynx is clear.   Eyes:      Extraocular Movements: Extraocular movements intact.      Pupils: Pupils are equal, round, and reactive to light.   Neck:      Trachea: Trachea normal.   Cardiovascular:      Rate and Rhythm: Regular rhythm. Bradycardia present.      Pulses: Normal pulses.   Pulmonary:      Effort: Pulmonary effort is normal. No respiratory distress.      Breath sounds: Normal breath sounds. No wheezing, rhonchi or rales.   Abdominal:      General: Abdomen is flat. Bowel sounds are increased. There is no distension.      Palpations: Abdomen is soft.      Tenderness: There is abdominal tenderness in the right upper quadrant and epigastric area. There is no  suspension 158 mg (158 mg Oral Given 11/23/22 0016)   sodium chloride 0 9 % bolus 316 mL (0 mL Intravenous Stopped 11/23/22 0347)       Diagnostic Studies  Results Reviewed     Procedure Component Value Units Date/Time    Urine Microscopic [[id number]]  (Abnormal) Collected: 11/23/22 0337    Lab Status: Final result Specimen: Urine, Clean Catch Updated: 11/23/22 0426     RBC, UA 1-2 /hpf      WBC, UA 1-2 /hpf      Epithelial Cells Occasional /hpf      Bacteria, UA None Seen /hpf      MUCUS THREADS Occasional     Hyaline Casts, UA 0-3 /lpf     UA (URINE) with reflex to Scope [[id number]]  (Abnormal) Collected: 11/23/22 0337    Lab Status: Final result Specimen: Urine, Clean Catch Updated: 11/23/22 0347     Color, UA Light Yellow     Clarity, UA Clear     Specific Gravity, UA 1 024     pH, UA 8 0     Leukocytes, UA Negative     Nitrite, UA Negative     Protein, UA 30 (1+) mg/dl      Glucose, UA Negative mg/dl      Ketones, UA Negative mg/dl      Urobilinogen, UA 2 0 mg/dl      Bilirubin, UA Negative     Occult Blood, UA Negative    Manual Differential(PHLEBS Do Not Order) [[id number]]  (Abnormal) Collected: 11/23/22 0236    Lab Status: Final result Specimen: Blood from Arm, Right Updated: 11/23/22 0337     Segmented % 36 %      Lymphocytes % 53 %      Monocytes % 4 %      Eosinophils, % 2 %      Basophils % 1 %      Atypical Lymphocytes % 4 %      Absolute Neutrophils 1 68 Thousand/uL      Lymphocytes Absolute 2 47 Thousand/uL      Monocytes Absolute 0 19 Thousand/uL      Eosinophils Absolute 0 09 Thousand/uL      Basophils Absolute 0 05 Thousand/uL      Total Counted --     RBC Morphology Normal     Platelet Estimate Adequate    CBC and differential [[id number]]  (Abnormal) Collected: 11/23/22 0236    Lab Status: Final result Specimen: Blood from Arm, Right Updated: 11/23/22 0337     WBC 4 66 Thousand/uL      RBC 4 18 Million/uL      Hemoglobin 13 0 g/dL      Hematocrit 38 0 %      MCV 91 fL      MCH 31 1 pg      MCHC 34 2 g/dL      RDW 12 4 %      MPV 9 9 fL      Platelets 099 Thousands/uL     Narrative: This is an appended report  These results have been appended to a previously verified report  Comprehensive metabolic panel [[id number]]  (Abnormal) Collected: 11/23/22 0236    Lab Status: Final result Specimen: Blood from Arm, Right Updated: 11/23/22 0302     Sodium 139 mmol/L      Potassium 4 1 mmol/L      Chloride 103 mmol/L      CO2 26 mmol/L      ANION GAP 10 mmol/L      BUN 14 mg/dL      Creatinine 0 39 mg/dL      Glucose 104 mg/dL      Calcium 9 4 mg/dL      AST 44 U/L      ALT 23 U/L      Alkaline Phosphatase 256 U/L      Total Protein 7 6 g/dL      Albumin 4 1 g/dL      Total Bilirubin 0 21 mg/dL      eGFR --    Narrative:      Notes:     1  eGFR calculation is only valid for adults 18 years and older  2  EGFR calculation cannot be performed for patients who are transgender, non-binary, or whose legal sex, sex at birth, and gender identity differ  FLU/RSV/COVID - if FLU/RSV clinically relevant [[id number]]  (Normal) Collected: 11/23/22 0014    Lab Status: Final result Specimen: Nares from Nose Updated: 11/23/22 0056     SARS-CoV-2 Negative     INFLUENZA A PCR Negative     INFLUENZA B PCR Negative     RSV PCR Negative    Narrative:      FOR PEDIATRIC PATIENTS - copy/paste COVID Guidelines URL to browser: [email or web address]    SARS-CoV-2 assay is a Nucleic Acid Amplification assay intended for the  qualitative detection of nucleic acid from SARS-CoV-2 in nasopharyngeal  swabs  Results are for the presumptive identification of SARS-CoV-2 RNA  Positive results are indicative of infection with SARS-CoV-2, the virus  causing COVID-19, but do not rule out bacterial infection or co-infection  with other viruses  Laboratories within the [place] and its  territories are required to report all positive results to the appropriate  public health authorities  guarding.      Comments: Diffuse severe tenderness with light palpation in epigastric region   Musculoskeletal:         General: Normal range of motion.      Cervical back: Normal range of motion.      Right lower leg: No edema.      Left lower leg: No edema.   Skin:     General: Skin is warm and dry.      Capillary Refill: Capillary refill takes less than 2 seconds.   Neurological:      General: No focal deficit present.      Mental Status: He is alert and oriented to person, place, and time.      Cranial Nerves: Cranial nerves 2-12 are intact.      Sensory: Sensation is intact.      Motor: Motor function is intact. No weakness.   Psychiatric:         Mood and Affect: Mood and affect normal.         Speech: Speech normal.         Behavior: Behavior normal. Behavior is cooperative.         Cognition and Memory: Cognition normal.         Judgment: Judgment normal.             Significant Labs: All pertinent labs within the past 24 hours have been reviewed.    Significant Imaging: I have reviewed all pertinent imaging results/findings within the past 24 hours.    Imaging Results              CT Maxillofacial Without Contrast (Final result)  Result time 09/18/23 02:55:38      Final result by Reyes Gary MD (09/18/23 02:55:38)                   Impression:      No acute abnormality.    Scattered dental caries.    Electronically signed by resident: Bennie Meade  Date:    09/18/2023  Time:    02:28    Electronically signed by: Reyes Gary MD  Date:    09/18/2023  Time:    02:55               Narrative:    EXAMINATION:  CT MAXILLOFACIAL WITHOUT CONTRAST    CLINICAL HISTORY:  Maxillofacial pain;    TECHNIQUE:  Low dose axial images, sagittal and coronal reformations were obtained through the face.  Contrast was not administered.    COMPARISON:  CT 12/16/2022.    FINDINGS:  Bony orbits, nasal bones, zygomatic arches, pterygoid plates, maxilla and mandible are intact.  No facial bone fracture.   "Temporomandibular joints are aligned.  Few small dental caries.    Minimal mucosal thickening in the ethmoid air cells and inferior maxillary sinuses.  Remainder of the paranasal sinuses are otherwise essentially clear.  No air-fluid levels to suggest acute sinusitis.  Mastoid air cells are aerated.    Optic globes, optic nerves, and extraocular muscles are unremarkable.  No infiltration of retrobulbar fat.  Subcentimeter cervical chain lymph nodes, many of which have decreased in size when compared with prior maxillofacial CT.    Salivary glands are unremarkable.                                       CT Abdomen Pelvis  Without Contrast (Final result)  Result time 09/18/23 02:46:30      Final result by Reyes Gary MD (09/18/23 02:46:30)                   Impression:      1. Hepatomegaly with geographic areas of hepatic steatosis and areas of relative fatty sparing.  2. Possible biliary sludge.  3. No acute abnormality identified on this noncontrast study.    Electronically signed by resident: Bennie Meade  Date:    09/18/2023  Time:    02:06    Electronically signed by: Reyes Gary MD  Date:    09/18/2023  Time:    02:46               Narrative:    EXAMINATION:  CT ABDOMEN PELVIS WITHOUT CONTRAST    CLINICAL HISTORY:  Nausea/vomiting;    TECHNIQUE:  Low dose axial images, sagittal and coronal reformations were obtained from the lung bases to the pubic symphysis without intravenous contrast.   Oral contrast was not administered.    COMPARISON:  CT 12/07/2021.    FINDINGS:  LUNG BASES & MEDIASTINUM (limited):  Heart is normal in size.  No pericardial effusion.  No pleural effusion.  No focal consolidation.    HEPATOBILIARY: Liver is enlarged measuring 21 cm craniocaudal.  Geographic regions of hepatic steatosis involving the left hepatic lobe and anterior aspect of the right hepatic lobe, with geographic areas of relative fatty sparing in the posterior right hepatic lobe.  However, the areas of "fatty " Negative results do not preclude SARS-CoV-2  infection and should not be used as the sole basis for treatment or other  patient management decisions  Negative results must be combined with  clinical observations, patient history, and epidemiological information  This test has not been FDA cleared or approved  This test has been authorized by FDA under an Emergency Use Authorization  (EUA)  This test is only authorized for the duration of time the  declaration that circumstances exist justifying the authorization of the  emergency use of an in vitro diagnostic tests for detection of SARS-CoV-2  virus and/or diagnosis of COVID-19 infection under section 564(b)(1) of  the Act, 21 U  [place] C  707PWF-3(F)(1), unless the authorization is terminated  or revoked sooner  The test has been validated but independent review by FDA  and CLIA is pending  Test performed using [employer] Gene[employer]pert: This RT-PCR assay targets N2,  a region unique to SARS-CoV-2  A conserved region in the E-gene was chosen  for pan-Sarbecovirus detection which includes SARS-CoV-2  According to CMS-2020-01-R, this platform meets the definition of high-throughput technology  XR chest 1 view portable   Final Result by Hanh Alva MD (11/23 0126)      No evidence of acute pulmonary disease  I personally discussed this study with [address] on 11/23/2022 at 1:23 AM                Workstation performed: [id number]                    Procedures  Procedures         ED Course  ED Course as of 11/23/22 0554   Wed Nov 23, 2022   0223 Multiple attempts to encouraged patient to drink unsuccessful by patient parents and nursing  After discussion with patient's mother, will place IV and administer fluid bolus  Patient last urination was 1500 hours  0410 Patient urinated after fluid bolus  Urinalysis unremarkable  Laboratory evaluation unremarkable  Patient still has not drank in the emergency room    I offered "sparing" in the posterior right hepatic lobe demonstrate density of approximately 30 HU which still meets criteria for hepatic steatosis.  Areas of more pronounced steatosis in the anterior liver measure approximately -1 HU.  No focal hepatic lesions.  No biliary ductal dilatation.  Possible biliary sludge in the gallbladder.  No calcified gallstones.  No gallbladder wall thickening or pericholecystic inflammatory fat stranding.    SPLEEN:  No splenomegaly.    PANCREAS:  No focal masses or ductal dilatation.    ADRENALS:  No adrenal nodules.    KIDNEYS/URETERS:  No hydronephrosis, stones or solid mass lesions. Ureters are unremarkable.    PELVIC ORGANS/BLADDER:  Bladder is moderately distended without focal wall thickening.  Prostate is unremarkable.    PERITONEUM / RETROPERITONEUM:  No free air. No free fluid.    LYMPH NODES:  No lymphadenopathy.    VESSELS:  No significant atherosclerosis or aneurysm.  Retroaortic left renal vein, anatomic variant.    GI TRACT: Distal esophagus and stomach are unremarkable.  Small bowel is normal in caliber without inflammation or obstruction.  Normal appendix.  No colonic distension or wall thickening.    BONES AND SOFT TISSUES: Soft tissues are unremarkable.  No fractures or focal osseous lesions.                                       X-Ray Chest AP Portable (Final result)  Result time 09/17/23 21:22:05      Final result by Reyes Gary MD (09/17/23 21:22:05)                   Impression:      No acute cardiopulmonary finding identified on this single view.      Electronically signed by: Reyes Gary MD  Date:    09/17/2023  Time:    21:22               Narrative:    EXAMINATION:  XR CHEST AP PORTABLE    CLINICAL HISTORY:  Provided history is "  Shortness of breath".    TECHNIQUE:  One view of the chest.    COMPARISON:  08/02/2022.    FINDINGS:  Cardiac wires overlie the chest.  Cardiomediastinal silhouette is not enlarged.  No focal consolidation.  No sizable pleural " encourage patient's mother to stay until patient is tolerating oral intake  She is considering this versus close outpatient follow-up and return to emergency room with patient does not drink  Patient's mother is concerned he may not drink due to the foreign environment in the emergency room with his autism  5926 Patient's mother wishes to leave and return to emergency with any continued difficulty with oral intake  Patient's mother appears reliable and I have discussed risks of this in detail  Patient may benefit from a regular environment to encourage to drink  I discussed and emphasized return to emergency room with any worsening or continued lack of oral intake  I encouraged her to monitor urine output  Discussed symptomatic management with honey based cough medicine  Patient has no brothers or sisters less than 1, discussed risks of honey less than 1  Discussed emphasized return precautions in detail  MDM    Disposition  Final diagnoses:   Cough   Dehydration     Time reflects when diagnosis was documented in both MDM as applicable and the Disposition within this note     Time User Action Codes Description Comment    11/23/2022  4:21 AM Alexis Stoll [R05 9] Cough     11/23/2022  4:21 AM Alexis Stoll [E86 0] Dehydration       ED Disposition     ED Disposition   Discharge    Condition   Stable    Date/Time   Wed Nov 23, 2022  4:21 AM    Comment   Navi Stephens discharge to home/self care  Follow-up Information     Follow up With Specialties Details Why Contact Info Additional Information    Pediatrics  Schedule an appointment as soon as possible for a visit in 1 day Follow up and reassessment        [address] [place] [address] Emergency Department Emergency Medicine Go to  If symptoms worsen [address] Emergency Department, [address] [place] [address], "effusion.  No pneumothorax.                                          Assessment/Plan:      Splenic vein thrombosis  - US liver with doppler ordered   - heme consult for assistance with AC     Epigastric pain  Acute necrotizing pancreatitis   Splenic vein thrombosis  Epigastric TTP on arrival, but tenderness primarily RUQ. EGD 9/18. On 9/19, acute pain in epigastric region now severe. Requiring IV pain meds. Not tolerating PO, frequent emesis.   - CT abd on 9/18 with possible biliary sludge but no evidence of GB inflammation  - Liver enzymes wnl. Lipase now 263 (12 on admission)  - repeat CT abd with contrast revealing necrotizing pancreatitis (no fluid collection) and splenic infarction.   - Pain uncontrolled on IV dilaudid q4 hours, increase frequency of dosing to q2 hours.  - Aggressive IVF  - CLD for now, continue scheduled antiemetics   - daily EKG to monitor QTc  - f/u GI recs  - f/u AES recs     Bloody diarrhea  Intermittent, chronic.   Collect stool studies given immunocompromised state\   --> no BM's since admission. Minimal PO intake.      Hepatitis C antibody positive in blood  - PCR not detected.  - CT abdomen with "hepatomegaly with geographic areas of hepatic steatosis and areas of relative fatty sparing."  - will refer to hepatology      Mild intermittent asthma without complication  -No s/s of acute exacerbation.  -Continue PRN albuterol inhaler.    Jaw pain  -Pt with history of TMJ, likely aggravated by frequent vomiting. No warmth or erythema on exam.  -CT maxillofacial reveals dental caries.  -PRN tylenol.  - referral to dentist     Anemia  Patient's anemia is currently controlled. S/p 0 units of PRBCs.   Current CBC reviewed-   Lab Results   Component Value Date    HGB 12.2 (L) 09/17/2023    HCT 37.9 (L) 09/17/2023     Monitor serial CBC and transfuse if patient becomes hemodynamically unstable, symptomatic or H/H drops below 7/21.   -Anemia panel.    Hematemesis  -Started on GIB pathway.  -Follow " [place], [place], [address]          There are no discharge medications for this patient  No discharge procedures on file      PDMP Review     None          ED Provider  Electronically Signed by           Brett Garcia MD  11/23/22 3434 H/H closely. Type and screen blood and transfuse as needed.   -> on 8/30 Hgb 13.7, now 11.4  -Received 80mg IV Protonix in the ED, continue 40mg IV BID  -Consult GI, appreciate recs   -> EGD performed 9/19. See results below.   -Start CLD, advance as tolerated  -scheduled antiemetics to prevent recurrent emesis      Findings on EGD 9/19:  - Corinne-Chauhan tear.   - Erythematous mucosa in the antrum.  Biopsied.   - Congested duodenal mucosa.  Biopsied.   GI Plan:  - [facility].   - Advance diet as tolerated.   - Continue present medications.   - Use Protonix (pantoprazole) 40 mg PO BID.   - Scheduled anti-emetics to prevent retching.  - Since patient is immunocompromsied and having diarrhea, agree with stool studies (ova/parasite, Giardia/cryptosporidium, calprotectin).     - Outpatient colonoscopy.   - Await pathology results.     Polycythemia  Chronic issue, stable. Follows with hematology outpatient.    HIV infection  This patient in known to have HIV+ status (Have detected HIV PCR but never CD4 <200 or AIDS defining illness). Continue to monitor routine labs. Last CD4 count was   Lab Results   Component Value Date    ABSOLUTECD4 1084 10/18/2022       We will not consult Infectious disease at this time. Other HIV-associated diseases are not present.    -Continue Home HIV medication  -repeat CD4   -CMV serology positive   -Hep C Ab pos, PCR neg  -Stool studies if able to collect   - will curbside ID to discuss findings      VTE Risk Mitigation (From admission, onward)         Ordered     Place sequential compression device  Until discontinued         09/17/23 2307     IP VTE LOW RISK PATIENT  Once         09/17/23 2307                Discharge Planning   CASTILLO: 9/21/2023     Code Status: Full Code   Is the patient medically ready for discharge?: No    Reason for patient still in hospital (select all that apply): Patient new problem, Patient trending condition, Laboratory test,  Treatment, Imaging and Consult recommendations  Discharge Plan A: Home                  Sunitha Morocho PA-C  Department of Hospital Medicine   [facility]

## 2023-09-20 NOTE — CONSULTS
[facility] - Observation 11H  Hematology/Oncology  Consult Note    Patient Name: Tasia Cardona  MRN: [id number]  Admission Date: 9/17/2023  Hospital Length of Stay: 0 days  Code Status: Full Code   Attending Provider: Srini Ferreira MD  Consulting Provider: Elizabet Rodriges MD  Primary Care Physician: Brittany, Primary Doctor  Principal Problem:Acute necrotizing pancreatitis    Inpatient consult to Hematology  Consult performed by: Elizabet Rodriges MD  Consult ordered by: Sunitha Morocho PA-C        Subjective:     HPI:  Mr. Tasia Cardona is a 23 year old man with HIV, polycythemia who presented on 09/17 with hematemesis. During his hospital stay, GI was consulted and performed EGD on 09/18 that showed a small bleeding Corinne-Chauhan tear with stigmata of recent bleeding. As patient continued with abdominal pain, CT abdomen/pelvis was done on 09/20, which demonstrated necrotizing pancreatitis and occluded splenic vein. Patient reports that he has not an episode of hematemesis since admission. He states nausea and diarrhea are improving. Hematology was consulted regarding anticoagulation for possible splenic vein thrombosis.      Oncology Treatment Plan:   [No matching plan found]    Medications:  Continuous Infusions:   lactated ringers 200 mL/hr at 09/20/23 1043     Scheduled Meds:   eoosixmrx-zowbtuod-wkzlgvt ala  1 tablet Oral Daily    folic acid  1 mg Oral Daily    pantoprazole  40 mg Intravenous BID    prochlorperazine  5 mg Intravenous Q6H     PRN Meds:acetaminophen, acetaminophen, albuterol, HYDROmorphone, HYDROmorphone, ondansetron, promethazine (PHENERGAN) 12.5 mg in dextrose 5 % (D5W) 50 mL IVPB, senna     Review of patient's allergies indicates:   Allergen Reactions    [place] Swelling    Pcn [penicillins] Hives    Pecan nut Swelling    Soy     Sulfa (sulfonamide antibiotics) Hives        Past Medical History:   Diagnosis Date    Asthma      Past Surgical History:   Procedure Laterality Date     ESOPHAGOGASTRODUODENOSCOPY N/A 9/18/2023    Procedure: EGD (ESOPHAGOGASTRODUODENOSCOPY);  Surgeon: Kg Cleaning MD;  Location: [address]);  Service: Endoscopy;  Laterality: N/A;     Family History       Problem Relation (Age of Onset)    Breast cancer Maternal Grandmother    Cancer Mother, Brother    No Known Problems Father    Ovarian cancer Mother    Pancreatitis Mother          Tobacco Use    Smoking status: Some Days     Types: Cigarettes    Smokeless tobacco: Never   Substance and Sexual Activity    Alcohol use: Yes    Drug use: Never    Sexual activity: Not on file       Review of Systems   Constitutional:  Negative for fatigue and fever.   HENT:  Negative for mouth sores and sinus pressure.    Eyes:  Negative for photophobia and visual disturbance.   Respiratory:  Negative for cough and shortness of breath.    Cardiovascular:  Negative for chest pain, palpitations and leg swelling.   Gastrointestinal:  Positive for abdominal pain and nausea. Negative for abdominal distention, blood in stool, diarrhea and vomiting.   Genitourinary:  Negative for dysuria, frequency and hematuria.   Musculoskeletal:  Negative for back pain, gait problem and myalgias.   Skin:  Negative for rash and wound.   Neurological:  Negative for dizziness, syncope and light-headedness.   Psychiatric/Behavioral:  Negative for confusion and hallucinations. The patient is not nervous/anxious.      Objective:     Vital Signs (Most Recent):  Temp: 97.6 °F (36.4 °C) (09/20/23 1109)  Pulse: 75 (09/20/23 1109)  Resp: 18 (09/20/23 1347)  BP: 135/87 (09/20/23 1109)  SpO2: 96 % (09/20/23 1109) Vital Signs (24h Range):  Temp:  [97.6 °F (36.4 °C)-98.1 °F (36.7 °C)] 97.6 °F (36.4 °C)  Pulse:  [] 75  Resp:  [16-18] 18  SpO2:  [96 %-100 %] 96 %  BP: (132-153)/(75-94) 135/87     Weight: 75.3 kg (166 lb)  Body mass index is 23.82 kg/m².  Body surface area is 1.93 meters squared.      Intake/Output Summary (Last 24 hours) at 9/20/2023  1539  Last data filed at 9/20/2023 1349  Gross per 24 hour   Intake 401 ml   Output 500 ml   Net -99 ml        Physical Exam  Constitutional:       Appearance: He is not toxic-appearing.   HENT:      Head: Normocephalic and atraumatic.      Nose: Nose normal. No congestion.   Eyes:      General: No scleral icterus.     Extraocular Movements: Extraocular movements intact.   Neck:      Trachea: Trachea normal.   Cardiovascular:      Rate and Rhythm: Normal rate and regular rhythm.   Pulmonary:      Effort: Pulmonary effort is normal. No respiratory distress.   Abdominal:      General: Abdomen is flat. There is no distension.      Palpations: Abdomen is soft.      Tenderness: There is abdominal tenderness. There is no guarding.   Musculoskeletal:         General: Normal range of motion.      Cervical back: Normal range of motion.      Right lower leg: No edema.      Left lower leg: No edema.   Skin:     General: Skin is warm and dry.   Neurological:      Mental Status: He is alert and oriented to person, place, and time.      Cranial Nerves: Cranial nerves 2-12 are intact.      Sensory: Sensation is intact.      Motor: Motor function is intact. No weakness.   Psychiatric:         Mood and Affect: Mood and affect normal.         Speech: Speech normal.         Behavior: Behavior normal. Behavior is cooperative.         Cognition and Memory: Cognition normal.          Significant Labs:   CBC:   Recent Labs   Lab 09/18/23  1854 09/19/23  0636 09/20/23  0553   WBC 8.96 10.15 12.34   HGB 12.4* 12.5* 12.8*   HCT 38.7* 39.4* 39.6*    253 171    and CMP:   Recent Labs   Lab 09/19/23  0636 09/20/23  0553    136   K 3.3* 3.8    100   CO2 23 26   GLU 96 119*   BUN 5* 6   CREATININE 0.7 0.7   CALCIUM 8.5* 8.7   PROT 6.1 6.0   ALBUMIN 3.2* 3.1*   BILITOT 1.0 1.1*   ALKPHOS 104 111   AST 34 72*   ALT 28 26   ANIONGAP 11 10       Diagnostic Results:  I have reviewed all pertinent imaging results/findings within the  past 24 hours.    Assessment/Plan:     Splenic vein thrombosis  Patient is a 23 year old with HIV, polycythemia who presented with hematemesis. Hospital course complicated by necrotizing pancreatitis and occlusion of splenic vein, concerning for splenic vein thrombosis.    There is not adequate evidence for treatment of splenic vein thrombosis due to acute pancreatitis. Per imaging, the pancreas, SMV and splenic vein were obscured by bowel gas artifact and not visualized. Occlusion possibly due to inflammation from necrotizing pancreatitis and not due to actual thrombus.    Recommendations:  As patient with recent hematemesis and no clear imaging to indicate thrombus, recommend against anticoagulation for now as risks outweigh benefits. Patient will likely need repeat abdominal imaging in 8-12 weeks to re-assess.     Polycythemia  Follows with Dr. Daniels. Will message to arrange for outpatient follow up.  Patient noted to have mild normocytic anemia likely due to folate deficiency. Agree with continuing folic acid supplementation.         Patient seen and discussed with attending, Dr. Raygoza.    Thank you for your consult. I will sign off. Please contact us if you have any additional questions.    Elizabet Rodriges MD  Hematology/Oncology  [facility] - Observation 11H

## 2023-09-20 NOTE — ASSESSMENT & PLAN NOTE
Pt presented w/acute on chronic hematemesis, usually preceeded by normal vomiting before quickly progressing to BRB that resolves spontaneously, w/ intermittent NSAID use concerning for recurrent brandy hitchcock tear vs. esophagitis. Pt w/ undetectable viral load/compliance w/ biktarvy. EGD showing erythematous gastric antrum mucosa, and duodenal mucosa congestion - bx taken - and brandy hitchcock tear - management consists of supportive care while the lesion heals. Patient w/ ongoing severe epigastric abdominal pain    - Continue present medications.   - Use Protonix (pantoprazole) 40 mg PO BID.   - Since patient is immunocompromsied and having diarrhea, agree with stool studies (ova/parasite, Giardia/cryptosporidium, calprotectin).     - Outpatient colonoscopy.   - Await pathology results.

## 2023-09-21 PROBLEM — R19.7 BLOODY DIARRHEA: Status: RESOLVED | Noted: 2023-09-18 | Resolved: 2023-09-21

## 2023-09-21 PROBLEM — K92.0 HEMATEMESIS: Status: RESOLVED | Noted: 2023-09-17 | Resolved: 2023-09-21

## 2023-09-21 PROBLEM — R68.84 JAW PAIN: Status: RESOLVED | Noted: 2023-09-18 | Resolved: 2023-09-21

## 2023-09-21 PROBLEM — D72.829 LEUKOCYTOSIS: Status: ACTIVE | Noted: 2023-09-21

## 2023-09-21 PROBLEM — R76.8 POSITIVE CMV IGG SEROLOGY: Status: ACTIVE | Noted: 2023-09-21

## 2023-09-21 LAB
BASOPHILS # BLD AUTO: 0.03 K/UL (ref 0–0.2)
BASOPHILS NFR BLD: 0.2 % (ref 0–1.9)
CD3+CD4+ CELLS # BLD: 296 CELLS/UL (ref 300–1400)
CD3+CD4+ CELLS NFR BLD: 32.7 % (ref 28–57)
CHOLEST SERPL-MCNC: 73 MG/DL (ref 120–199)
CHOLEST/HDLC SERPL: 3 {RATIO} (ref 2–5)
DIFFERENTIAL METHOD: ABNORMAL
EOSINOPHIL # BLD AUTO: 0 K/UL (ref 0–0.5)
EOSINOPHIL NFR BLD: 0.1 % (ref 0–8)
ERYTHROCYTE [DISTWIDTH] IN BLOOD BY AUTOMATED COUNT: 20.2 % (ref 11.5–14.5)
GLIADIN PEPTIDE IGA SER-ACNC: 2.1 U/ML
GLIADIN PEPTIDE IGG SER-ACNC: <0.6 U/ML
HCT VFR BLD AUTO: 36.4 % (ref 40–54)
HDLC SERPL-MCNC: 24 MG/DL (ref 40–75)
HDLC SERPL: 32.9 % (ref 20–50)
HGB BLD-MCNC: 11.6 G/DL (ref 14–18)
IGA SERPL-MCNC: 485 MG/DL (ref 70–400)
IMM GRANULOCYTES # BLD AUTO: 0.07 K/UL (ref 0–0.04)
IMM GRANULOCYTES NFR BLD AUTO: 0.5 % (ref 0–0.5)
LACTATE SERPL-SCNC: 0.9 MMOL/L (ref 0.5–2.2)
LDLC SERPL CALC-MCNC: 38.4 MG/DL (ref 63–159)
LIPASE SERPL-CCNC: 332 U/L (ref 4–60)
LYMPHOCYTES # BLD AUTO: 1.3 K/UL (ref 1–4.8)
LYMPHOCYTES NFR BLD: 8.8 % (ref 18–48)
MCH RBC QN AUTO: 30.1 PG (ref 27–31)
MCHC RBC AUTO-ENTMCNC: 31.9 G/DL (ref 32–36)
MCV RBC AUTO: 94 FL (ref 82–98)
MONOCYTES # BLD AUTO: 1.9 K/UL (ref 0.3–1)
MONOCYTES NFR BLD: 13.2 % (ref 4–15)
NEUTROPHILS # BLD AUTO: 11.4 K/UL (ref 1.8–7.7)
NEUTROPHILS NFR BLD: 77.2 % (ref 38–73)
NONHDLC SERPL-MCNC: 49 MG/DL
NRBC BLD-RTO: 0 /100 WBC
PLATELET # BLD AUTO: 139 K/UL (ref 150–450)
PMV BLD AUTO: 11.1 FL (ref 9.2–12.9)
RBC # BLD AUTO: 3.86 M/UL (ref 4.6–6.2)
TRIGL SERPL-MCNC: 53 MG/DL (ref 30–150)
TTG IGA SER-ACNC: 0.8 U/ML
TTG IGG SER-ACNC: <0.6 U/ML
WBC # BLD AUTO: 14.72 K/UL (ref 3.9–12.7)

## 2023-09-21 PROCEDURE — 99900035 HC TECH TIME PER 15 MIN (STAT)

## 2023-09-21 PROCEDURE — 99223 PR INITIAL HOSPITAL CARE,LEVL III: ICD-10-PCS | Mod: ,,, | Performed by: INTERNAL MEDICINE

## 2023-09-21 PROCEDURE — 25000003 PHARM REV CODE 250: Performed by: NURSE PRACTITIONER

## 2023-09-21 PROCEDURE — 80061 LIPID PANEL: CPT | Performed by: STUDENT IN AN ORGANIZED HEALTH CARE EDUCATION/TRAINING PROGRAM

## 2023-09-21 PROCEDURE — 63600175 PHARM REV CODE 636 W HCPCS

## 2023-09-21 PROCEDURE — 99232 SBSQ HOSP IP/OBS MODERATE 35: CPT | Mod: ,,,

## 2023-09-21 PROCEDURE — 21400001 HC TELEMETRY ROOM

## 2023-09-21 PROCEDURE — 25000003 PHARM REV CODE 250

## 2023-09-21 PROCEDURE — 99233 SBSQ HOSP IP/OBS HIGH 50: CPT | Mod: ,,, | Performed by: STUDENT IN AN ORGANIZED HEALTH CARE EDUCATION/TRAINING PROGRAM

## 2023-09-21 PROCEDURE — 36415 COLL VENOUS BLD VENIPUNCTURE: CPT

## 2023-09-21 PROCEDURE — 83690 ASSAY OF LIPASE: CPT | Performed by: NURSE PRACTITIONER

## 2023-09-21 PROCEDURE — 94761 N-INVAS EAR/PLS OXIMETRY MLT: CPT

## 2023-09-21 PROCEDURE — 99233 PR SUBSEQUENT HOSPITAL CARE,LEVL III: ICD-10-PCS | Mod: ,,, | Performed by: STUDENT IN AN ORGANIZED HEALTH CARE EDUCATION/TRAINING PROGRAM

## 2023-09-21 PROCEDURE — 99223 1ST HOSP IP/OBS HIGH 75: CPT | Mod: ,,, | Performed by: INTERNAL MEDICINE

## 2023-09-21 PROCEDURE — C9113 INJ PANTOPRAZOLE SODIUM, VIA: HCPCS

## 2023-09-21 PROCEDURE — 85025 COMPLETE CBC W/AUTO DIFF WBC: CPT | Performed by: NURSE PRACTITIONER

## 2023-09-21 PROCEDURE — 99232 PR SUBSEQUENT HOSPITAL CARE,LEVL II: ICD-10-PCS | Mod: ,,,

## 2023-09-21 PROCEDURE — 36415 COLL VENOUS BLD VENIPUNCTURE: CPT | Performed by: STUDENT IN AN ORGANIZED HEALTH CARE EDUCATION/TRAINING PROGRAM

## 2023-09-21 PROCEDURE — 83605 ASSAY OF LACTIC ACID: CPT

## 2023-09-21 PROCEDURE — 27000221 HC OXYGEN, UP TO 24 HOURS

## 2023-09-21 PROCEDURE — 63600175 PHARM REV CODE 636 W HCPCS: Performed by: STUDENT IN AN ORGANIZED HEALTH CARE EDUCATION/TRAINING PROGRAM

## 2023-09-21 RX ORDER — METRONIDAZOLE 500 MG/100ML
500 INJECTION, SOLUTION INTRAVENOUS
Status: DISCONTINUED | OUTPATIENT
Start: 2023-09-21 | End: 2023-09-25 | Stop reason: HOSPADM

## 2023-09-21 RX ORDER — SODIUM CHLORIDE, SODIUM LACTATE, POTASSIUM CHLORIDE, CALCIUM CHLORIDE 600; 310; 30; 20 MG/100ML; MG/100ML; MG/100ML; MG/100ML
INJECTION, SOLUTION INTRAVENOUS CONTINUOUS
Status: DISCONTINUED | OUTPATIENT
Start: 2023-09-21 | End: 2023-09-25 | Stop reason: HOSPADM

## 2023-09-21 RX ADMIN — PANTOPRAZOLE SODIUM 40 MG: 40 INJECTION, POWDER, FOR SOLUTION INTRAVENOUS at 09:09

## 2023-09-21 RX ADMIN — SODIUM CHLORIDE, POTASSIUM CHLORIDE, SODIUM LACTATE AND CALCIUM CHLORIDE: 600; 310; 30; 20 INJECTION, SOLUTION INTRAVENOUS at 05:09

## 2023-09-21 RX ADMIN — CEFTRIAXONE 1 G: 1 INJECTION, POWDER, FOR SOLUTION INTRAMUSCULAR; INTRAVENOUS at 03:09

## 2023-09-21 RX ADMIN — HYDROMORPHONE HYDROCHLORIDE 1 MG: 1 INJECTION, SOLUTION INTRAMUSCULAR; INTRAVENOUS; SUBCUTANEOUS at 10:09

## 2023-09-21 RX ADMIN — FOLIC ACID 1 MG: 1 TABLET ORAL at 08:09

## 2023-09-21 RX ADMIN — SENNOSIDES 8.6 MG: 8.6 TABLET, FILM COATED ORAL at 09:09

## 2023-09-21 RX ADMIN — PROCHLORPERAZINE EDISYLATE 5 MG: 5 INJECTION INTRAMUSCULAR; INTRAVENOUS at 02:09

## 2023-09-21 RX ADMIN — BICTEGRAVIR SODIUM, EMTRICITABINE, AND TENOFOVIR ALAFENAMIDE FUMARATE 1 TABLET: 50; 200; 25 TABLET ORAL at 08:09

## 2023-09-21 RX ADMIN — PROCHLORPERAZINE EDISYLATE 5 MG: 5 INJECTION INTRAMUSCULAR; INTRAVENOUS at 06:09

## 2023-09-21 RX ADMIN — HYDROMORPHONE HYDROCHLORIDE 0.5 MG: 1 INJECTION, SOLUTION INTRAMUSCULAR; INTRAVENOUS; SUBCUTANEOUS at 08:09

## 2023-09-21 RX ADMIN — HYDROMORPHONE HYDROCHLORIDE 0.5 MG: 1 INJECTION, SOLUTION INTRAMUSCULAR; INTRAVENOUS; SUBCUTANEOUS at 06:09

## 2023-09-21 RX ADMIN — PANTOPRAZOLE SODIUM 40 MG: 40 INJECTION, POWDER, FOR SOLUTION INTRAVENOUS at 08:09

## 2023-09-21 RX ADMIN — SODIUM CHLORIDE, POTASSIUM CHLORIDE, SODIUM LACTATE AND CALCIUM CHLORIDE 500 ML: 600; 310; 30; 20 INJECTION, SOLUTION INTRAVENOUS at 06:09

## 2023-09-21 RX ADMIN — ACETAMINOPHEN 1000 MG: 500 TABLET ORAL at 06:09

## 2023-09-21 RX ADMIN — HYDROMORPHONE HYDROCHLORIDE 1 MG: 1 INJECTION, SOLUTION INTRAMUSCULAR; INTRAVENOUS; SUBCUTANEOUS at 03:09

## 2023-09-21 RX ADMIN — HYDROMORPHONE HYDROCHLORIDE 1 MG: 1 INJECTION, SOLUTION INTRAMUSCULAR; INTRAVENOUS; SUBCUTANEOUS at 11:09

## 2023-09-21 RX ADMIN — PROCHLORPERAZINE EDISYLATE 5 MG: 5 INJECTION INTRAMUSCULAR; INTRAVENOUS at 12:09

## 2023-09-21 RX ADMIN — METRONIDAZOLE 500 MG: 5 INJECTION, SOLUTION INTRAVENOUS at 06:09

## 2023-09-21 RX ADMIN — HYDROMORPHONE HYDROCHLORIDE 1 MG: 1 INJECTION, SOLUTION INTRAMUSCULAR; INTRAVENOUS; SUBCUTANEOUS at 08:09

## 2023-09-21 RX ADMIN — METRONIDAZOLE 500 MG: 5 INJECTION, SOLUTION INTRAVENOUS at 10:09

## 2023-09-21 RX ADMIN — PROCHLORPERAZINE EDISYLATE 5 MG: 5 INJECTION INTRAMUSCULAR; INTRAVENOUS at 11:09

## 2023-09-21 RX ADMIN — PROCHLORPERAZINE EDISYLATE 5 MG: 5 INJECTION INTRAMUSCULAR; INTRAVENOUS at 05:09

## 2023-09-21 RX ADMIN — HYDROMORPHONE HYDROCHLORIDE 1 MG: 1 INJECTION, SOLUTION INTRAMUSCULAR; INTRAVENOUS; SUBCUTANEOUS at 01:09

## 2023-09-21 RX ADMIN — HYDROMORPHONE HYDROCHLORIDE 1 MG: 1 INJECTION, SOLUTION INTRAMUSCULAR; INTRAVENOUS; SUBCUTANEOUS at 02:09

## 2023-09-21 RX ADMIN — METRONIDAZOLE 500 MG: 5 INJECTION, SOLUTION INTRAVENOUS at 03:09

## 2023-09-21 NOTE — ASSESSMENT & PLAN NOTE
-mIVF  -Antiemetics and pain control  -Hem Onc recs on anticoagulation. Recommended outpatient repeat US   - Clears as tolerated

## 2023-09-21 NOTE — CONSULTS
[facility] - Observation 11H  Infectious Disease  Consult Note    Patient Name: Tasia Cardona  MRN: [id number]  Admission Date: 9/17/2023  Hospital Length of Stay: 1 days  Attending Physician: Jimbo Flor MD  Primary Care Provider: No, Primary Doctor     Isolation Status: No active isolations    Patient information was obtained from patient, past medical records and ER records.      Inpatient consult to Infectious Diseases  Consult performed by: Jeremias Herrera MD  Consult ordered by: Jimbo Flor MD        Assessment/Plan:     ID  Positive CMV IgG serology  CMV IgG positive - this is positive in 80 to 90% of adults. No evidence of immune compromise or CMV infection. Awaiting biopsies and current CD4 count, but CMV is very unlikely to cause his symptoms, especially without any evidence of CMV lesions in the esophagus. No indication at this time to perform special studies or PCR. Awaiting biopsies for confirmation.    HIV infection  24 yo male with HIV, HCV (in SVR), and PCV who presents with GI symptoms for over 4 months. HIV is well controlled on Biktarvy.    Recommend:  No change in HAART - continue Biktarvy for now.    GI  * Acute necrotizing pancreatitis  Pancreatitis may be related to splenic vein thrombosis - as may the other symptoms. Defer to hematology, as this may be related to PCV. No evidence that this is related to HIV or Biktarvy at this time.     Hepatitis C antibody positive in blood  HCV PCR was negative in December 2021. No additional therapy needed at this time.        Thank you for your consult. I will follow-up with patient. Please contact us if you have any additional questions.    Jeremias Herrera MD  Infectious Disease  [facility] - Observation 11H    Subjective:     Principal Problem: Acute necrotizing pancreatitis    HPI: 24 yo male with HIV, HCV (in SVR), polycythemia vera, who presented with a 4 day history of vomiting and hematemesis on 9/17. For his HIV he has been well  "controlled on biktarvy: he was undetectable on March 28. 2023. He has been having GI symptoms for the past few months, including nausea, vomiting, hematemesis, bloody diarrhea, and melena. He reported this to [facility] on 9/12, but came here before his evaluation was complete. CT showed "Necrotizing pancreatitis.  No acute necrotic collections.  Occlusion of the splenic vein." His lipase was normal on admission, but cydney to 332 today.    He had a EGD on 9/18 and was found to have a Corinne-Chauhan tear. A CMV IgG was positive. Biopsies are pending. Stool studies are pending - he has not had a stool since 9/16 - no colonoscopy has been performed. He deferred anoscopy on 9/12.      Past Medical History:   Diagnosis Date    Asthma        Past Surgical History:   Procedure Laterality Date    ESOPHAGOGASTRODUODENOSCOPY N/A 9/18/2023    Procedure: EGD (ESOPHAGOGASTRODUODENOSCOPY);  Surgeon: Kg Cleaning MD;  Location: [address]);  Service: Endoscopy;  Laterality: N/A;       Review of patient's allergies indicates:   Allergen Reactions    [place] Swelling    Pcn [penicillins] Hives    Pecan nut Swelling    Soy     Sulfa (sulfonamide antibiotics) Hives       Medications:  Medications Prior to Admission   Medication Sig    vhkztnydr-fzhejnbp-lyiqqxn ala (BIKTARVY) -25 mg (25 kg or greater) Take 1 tablet by mouth once daily.    ibuprofen (ADVIL,MOTRIN) 400 MG tablet Take 400 mg by mouth every 4 (four) hours as needed (Inflammation).    ondansetron (ZOFRAN-ODT) 4 MG TbDL Take 1 tablet (4 mg total) by mouth every 6 (six) hours as needed (nausea). (Patient not taking: Reported on 9/18/2023)     Antibiotics (From admission, onward)      Start     Stop Route Frequency Ordered    09/21/23 0315  cefTRIAXone (ROCEPHIN) 1 g in dextrose 5 % in water (D5W) 100 mL IVPB (MB+)         -- IV Every 24 hours (non-standard times) 09/21/23 0209    09/21/23 0209  metronidazole IVPB 500 mg         -- IV Every 8 hours " (non-standard times) 09/21/23 0209          Antifungals (From admission, onward)      None          Antivirals (From admission, onward)          Stop Route Frequency     gravlwequ-ydwlrgmh-ixbfjbj ala         -- Oral Daily             Immunization History   Administered Date(s) Administered    COVID-19, MRNA, LN-S, PF (MODERNA FULL 0.5 ML DOSE) 10/08/2021    Hepatitis B (recombinant) Adjuvanted, 2 dose 06/16/2022, 09/20/2022    Pneumococcal Conjugate - 20 Valent 06/16/2022    Tdap 08/17/2019    meningococcal Conjugate (MCV4O) 06/16/2022       Family History       Problem Relation (Age of Onset)    Breast cancer Maternal Grandmother    Cancer Mother, Brother    No Known Problems Father    Ovarian cancer Mother    Pancreatitis Mother          Social History     Socioeconomic History    Marital status: Single   Tobacco Use    Smoking status: Some Days     Types: Cigarettes    Smokeless tobacco: Never   Substance and Sexual Activity    Alcohol use: Yes    Drug use: Never     Social Determinants of Health     Financial Resource Strain: Low Risk  (9/18/2023)    Overall Financial Resource Strain (CARDIA)     Difficulty of Paying Living Expenses: Not hard at all   Food Insecurity: No Food Insecurity (9/18/2023)    Hunger Vital Sign     Worried About Running Out of Food in the Last Year: Never true     Ran Out of Food in the Last Year: Never true   Transportation Needs: No Transportation Needs (9/18/2023)    PRAPARE - Transportation     Lack of Transportation (Medical): No     Lack of Transportation (Non-Medical): No   Physical Activity: Inactive (9/18/2023)    Exercise Vital Sign     Days of Exercise per Week: 0 days     Minutes of Exercise per Session: 0 min   Stress: Stress Concern Present (9/18/2023)    [employer] - Occupational Stress Questionnaire     Feeling of Stress : To some extent   Social Connections: Socially Isolated (9/18/2023)    Social Connection and Isolation  Panel [NHANES]     Frequency of Communication with Friends and Family: More than three times a week     Frequency of Social Gatherings with Friends and Family: Once a week     Attends Hoahaoism Services: Never     Active Member of Clubs or Organizations: No     Attends Club or Organization Meetings: Never     Marital Status: Never    Housing Stability: Low Risk  (9/18/2023)    Housing Stability Vital Sign     Unable to Pay for Housing in the Last Year: No     Number of Places Lived in the Last Year: 1     Unstable Housing in the Last Year: No     Review of Systems   Constitutional:  Negative for fever.   All other systems reviewed and are negative.    Objective:     Vital Signs (Most Recent):  Temp: 98.4 °F (36.9 °C) (09/21/23 1139)  Pulse: 104 (09/21/23 1139)  Resp: 14 (09/21/23 1439)  BP: 126/78 (09/21/23 1139)  SpO2: (!) 93 % (09/21/23 1139) Vital Signs (24h Range):  Temp:  [98.4 °F (36.9 °C)-100.3 °F (37.9 °C)] 98.4 °F (36.9 °C)  Pulse:  [102-130] 104  Resp:  [14-18] 14  SpO2:  [92 %-96 %] 93 %  BP: (126-140)/(78-89) 126/78     Weight: 75.3 kg (166 lb)  Body mass index is 23.82 kg/m².    Estimated Creatinine Clearance: 168 mL/min (based on SCr of 0.7 mg/dL).     Physical Exam  Vitals and nursing note reviewed.   Constitutional:       Appearance: Normal appearance.   HENT:      Head: Normocephalic and atraumatic.   Eyes:      Extraocular Movements: Extraocular movements intact.      Conjunctiva/sclera: Conjunctivae normal.      Pupils: Pupils are equal, round, and reactive to light.   Cardiovascular:      Rate and Rhythm: Normal rate and regular rhythm.   Pulmonary:      Effort: Pulmonary effort is normal.      Breath sounds: Normal breath sounds.   Abdominal:      General: Abdomen is flat. Bowel sounds are normal.      Tenderness: There is abdominal tenderness. There is no guarding.   Skin:     General: Skin is warm and dry.   Neurological:      General: No focal deficit present.      Mental  "Status: He is alert and oriented to person, place, and time.          Significant Labs: Blood Culture: No results for input(s): "LABBLOO" in the last 4320 hours.  C4 Count: No results for input(s): "C4" in the last 48 hours.  CBC:   Recent Labs   Lab 09/20/23  0553 09/21/23  0403   WBC 12.34 14.72*   HGB 12.8* 11.6*   HCT 39.6* 36.4*    139*     CMP:   Recent Labs   Lab 09/20/23  0553      K 3.8      CO2 26   *   BUN 6   CREATININE 0.7   CALCIUM 8.7   PROT 6.0   ALBUMIN 3.1*   BILITOT 1.1*   ALKPHOS 111   AST 72*   ALT 26   ANIONGAP 10     Lipase:   Recent Labs   Lab 09/21/23  0403   LIPASE 332*       Significant Imaging: I have reviewed all pertinent imaging results/findings within the past 24 hours.              "

## 2023-09-21 NOTE — SUBJECTIVE & OBJECTIVE
Interval History: Pt seen and examined this morning on eryn CHANDLER. Abdominal pain persists, able to tolerate some of his CLD. Discussed need for ID consult today. Care plan reviewed. Otherwise, doing well and with no further complaints at this time.      Objective:     Vital Signs (Most Recent):  Temp: 98.4 °F (36.9 °C) (09/21/23 1139)  Pulse: 104 (09/21/23 1139)  Resp: 18 (09/21/23 1139)  BP: 126/78 (09/21/23 1139)  SpO2: (!) 93 % (09/21/23 1139) Vital Signs (24h Range):  Temp:  [98.4 °F (36.9 °C)-100.3 °F (37.9 °C)] 98.4 °F (36.9 °C)  Pulse:  [102-130] 104  Resp:  [18] 18  SpO2:  [92 %-96 %] 93 %  BP: (126-140)/(78-89) 126/78     Weight: 75.3 kg (166 lb)  Body mass index is 23.82 kg/m².    Intake/Output Summary (Last 24 hours) at 9/21/2023 1249  Last data filed at 9/20/2023 1349  Gross per 24 hour   Intake --   Output 500 ml   Net -500 ml         Physical Exam  Gen: in NAD, appears stated age  Neuro: AAOx4, CN2-12 grossly intact BL; motor, sensory, and strength grossly intact BL  HEENT: NTNC, EOMI, PERRLA, MMM; no thyromegaly or lymphadenopathy; no JVD appreciated  CVS: RRR, no m/r/g; S1/S2 auscultated with no S3 or S4; capillary refill < 2 sec  Resp: lungs CTAB, no w/r/r; no belabored breathing or accessory muscle use appreciated   Abd: BS+ in all 4 quadrants; diffusely TTP, guarding present; soft to palpation; no organomegaly appreciated   Extrem: pulses full, equal, and regular over all 4 extremities; no UE or LE edema BL      Significant Labs: All pertinent labs within the past 24 hours have been reviewed.    Significant Imaging: I have reviewed all pertinent imaging results/findings within the past 24 hours.

## 2023-09-21 NOTE — HPI
"22 yo male with HIV, HCV (in SVR), polycythemia vera, who presented with a 4 day history of vomiting and hematemesis on 9/17. For his HIV he has been well controlled on biktarvy: he was undetectable on March 28. 2023. He has been having GI symptoms for the past few months, including nausea, vomiting, hematemesis, bloody diarrhea, and melena. He reported this to [facility] on 9/12, but came here before his evaluation was complete. CT showed "Necrotizing pancreatitis.  No acute necrotic collections.  Occlusion of the splenic vein." His lipase was normal on admission, but cydney to 332 today.    He had a EGD on 9/18 and was found to have a Corinne-Chauhan tear. A CMV IgG was positive. Biopsies are pending. Stool studies are pending - he has not had a stool since 9/16 - no colonoscopy has been performed. He deferred anoscopy on 9/12.  "

## 2023-09-21 NOTE — SUBJECTIVE & OBJECTIVE
Past Medical History:   Diagnosis Date    Asthma        Past Surgical History:   Procedure Laterality Date    ESOPHAGOGASTRODUODENOSCOPY N/A 9/18/2023    Procedure: EGD (ESOPHAGOGASTRODUODENOSCOPY);  Surgeon: Kg Cleaning MD;  Location: [address];  Service: Endoscopy;  Laterality: N/A;       Review of patient's allergies indicates:   Allergen Reactions    [place] Swelling    Pcn [penicillins] Hives    Pecan nut Swelling    Soy     Sulfa (sulfonamide antibiotics) Hives       Medications:  Medications Prior to Admission   Medication Sig    wqqerwsmx-zezesway-bodmsci ala (BIKTARVY) -25 mg (25 kg or greater) Take 1 tablet by mouth once daily.    ibuprofen (ADVIL,MOTRIN) 400 MG tablet Take 400 mg by mouth every 4 (four) hours as needed (Inflammation).    ondansetron (ZOFRAN-ODT) 4 MG TbDL Take 1 tablet (4 mg total) by mouth every 6 (six) hours as needed (nausea). (Patient not taking: Reported on 9/18/2023)     Antibiotics (From admission, onward)      Start     Stop Route Frequency Ordered    09/21/23 0315  cefTRIAXone (ROCEPHIN) 1 g in dextrose 5 % in water (D5W) 100 mL IVPB (MB+)         -- IV Every 24 hours (non-standard times) 09/21/23 0209    09/21/23 0209  metronidazole IVPB 500 mg         -- IV Every 8 hours (non-standard times) 09/21/23 0209          Antifungals (From admission, onward)      None          Antivirals (From admission, onward)          Stop Route Frequency     ihssflvcq-qutasaaf-lahrjpx ala         -- Oral Daily             Immunization History   Administered Date(s) Administered    COVID-19, MRNA, LN-S, PF (MODERNA FULL 0.5 ML DOSE) 10/08/2021    Hepatitis B (recombinant) Adjuvanted, 2 dose 06/16/2022, 09/20/2022    Pneumococcal Conjugate - 20 Valent 06/16/2022    Tdap 08/17/2019    meningococcal Conjugate (MCV4O) 06/16/2022       Family History       Problem Relation (Age of Onset)    Breast cancer Maternal Grandmother    Cancer Mother, Brother    No Known Problems Father     Ovarian cancer Mother    Pancreatitis Mother          Social History     Socioeconomic History    Marital status: Single   Tobacco Use    Smoking status: Some Days     Types: Cigarettes    Smokeless tobacco: Never   Substance and Sexual Activity    Alcohol use: Yes    Drug use: Never     Social Determinants of Health     Financial Resource Strain: Low Risk  (9/18/2023)    Overall Financial Resource Strain (CARDIA)     Difficulty of Paying Living Expenses: Not hard at all   Food Insecurity: No Food Insecurity (9/18/2023)    Hunger Vital Sign     Worried About Running Out of Food in the Last Year: Never true     Ran Out of Food in the Last Year: Never true   Transportation Needs: No Transportation Needs (9/18/2023)    PRAPARE - Transportation     Lack of Transportation (Medical): No     Lack of Transportation (Non-Medical): No   Physical Activity: Inactive (9/18/2023)    Exercise Vital Sign     Days of Exercise per Week: 0 days     Minutes of Exercise per Session: 0 min   Stress: Stress Concern Present (9/18/2023)    [employer] [place] [employer] - Occupational Stress Questionnaire     Feeling of Stress : To some extent   Social Connections: Socially Isolated (9/18/2023)    Social Connection and Isolation Panel [NHANES]     Frequency of Communication with Friends and Family: More than three times a week     Frequency of Social Gatherings with Friends and Family: Once a week     Attends Sikh Services: Never     Active Member of Clubs or Organizations: No     Attends Club or Organization Meetings: Never     Marital Status: Never    Housing Stability: Low Risk  (9/18/2023)    Housing Stability Vital Sign     Unable to Pay for Housing in the Last Year: No     Number of Places Lived in the Last Year: 1     Unstable Housing in the Last Year: No     Review of Systems   Constitutional:  Negative for fever.   All other systems reviewed and are negative.    Objective:     Vital Signs (Most Recent):  Temp:  "98.4 °F (36.9 °C) (09/21/23 1139)  Pulse: 104 (09/21/23 1139)  Resp: 14 (09/21/23 1439)  BP: 126/78 (09/21/23 1139)  SpO2: (!) 93 % (09/21/23 1139) Vital Signs (24h Range):  Temp:  [98.4 °F (36.9 °C)-100.3 °F (37.9 °C)] 98.4 °F (36.9 °C)  Pulse:  [102-130] 104  Resp:  [14-18] 14  SpO2:  [92 %-96 %] 93 %  BP: (126-140)/(78-89) 126/78     Weight: 75.3 kg (166 lb)  Body mass index is 23.82 kg/m².    Estimated Creatinine Clearance: 168 mL/min (based on SCr of 0.7 mg/dL).     Physical Exam  Vitals and nursing note reviewed.   Constitutional:       Appearance: Normal appearance.   HENT:      Head: Normocephalic and atraumatic.   Eyes:      Extraocular Movements: Extraocular movements intact.      Conjunctiva/sclera: Conjunctivae normal.      Pupils: Pupils are equal, round, and reactive to light.   Cardiovascular:      Rate and Rhythm: Normal rate and regular rhythm.   Pulmonary:      Effort: Pulmonary effort is normal.      Breath sounds: Normal breath sounds.   Abdominal:      General: Abdomen is flat. Bowel sounds are normal.      Tenderness: There is abdominal tenderness. There is no guarding.   Skin:     General: Skin is warm and dry.   Neurological:      General: No focal deficit present.      Mental Status: He is alert and oriented to person, place, and time.          Significant Labs: Blood Culture: No results for input(s): "LABBLOO" in the last 4320 hours.  C4 Count: No results for input(s): "C4" in the last 48 hours.  CBC:   Recent Labs   Lab 09/20/23  0553 09/21/23  0403   WBC 12.34 14.72*   HGB 12.8* 11.6*   HCT 39.6* 36.4*    139*     CMP:   Recent Labs   Lab 09/20/23  0553      K 3.8      CO2 26   *   BUN 6   CREATININE 0.7   CALCIUM 8.7   PROT 6.0   ALBUMIN 3.1*   BILITOT 1.1*   ALKPHOS 111   AST 72*   ALT 26   ANIONGAP 10     Lipase:   Recent Labs   Lab 09/21/23  0403   LIPASE 332*       Significant Imaging: I have reviewed all pertinent imaging results/findings within the past 24 " hours.

## 2023-09-21 NOTE — SUBJECTIVE & OBJECTIVE
Subjective:     Interval History: Followed up with patient s/p EGD 9/18 with mild gastritis and brandy hitchcock tear. STAT CT abdomen with contrast completed yesterday revealed necrotizing pancreatitis with possible splenic thrombus. Thrombus not seen on ultrasound. Hem/Onc weighed in and recommended against anticoagulation at this time. Continuing supportive treatment with IVF, pain control and antiemetics. Patient still reports significant amount of pain, however, his nausea/vomiting have improved. Low grade temp overnight, likely from the pancreatitis. Stool studies ordered for his subjective diarrhea, however patient hasnt had a BM since Saturday.    Review of Systems   Constitutional:  Negative for fatigue and fever.   HENT:  Negative for sore throat.    Respiratory:  Negative for apnea, cough and shortness of breath.    Cardiovascular:  Negative for chest pain, palpitations and leg swelling.   Gastrointestinal:  Positive for abdominal pain, nausea and vomiting. Negative for constipation and diarrhea.   Genitourinary:  Negative for dysuria, frequency and urgency.   Skin:  Negative for rash.     Objective:     Vital Signs (Most Recent):  Temp: 98.4 °F (36.9 °C) (09/21/23 0810)  Pulse: (!) 111 (09/21/23 0810)  Resp: 18 (09/21/23 0822)  BP: 131/83 (09/21/23 0810)  SpO2: (!) 94 % (09/21/23 0810) Vital Signs (24h Range):  Temp:  [97.6 °F (36.4 °C)-100.3 °F (37.9 °C)] 98.4 °F (36.9 °C)  Pulse:  [] 111  Resp:  [16-18] 18  SpO2:  [92 %-96 %] 94 %  BP: (131-140)/(83-89) 131/83     Weight: 75.3 kg (166 lb) (09/18/23 1950)  Body mass index is 23.82 kg/m².      Intake/Output Summary (Last 24 hours) at 9/21/2023 1014  Last data filed at 9/20/2023 1349  Gross per 24 hour   Intake 400 ml   Output 500 ml   Net -100 ml         Lines/Drains/Airways       Peripheral Intravenous Line  Duration                  Peripheral IV - Single Lumen 09/19/23 1600 22 G Anterior;Proximal;Right Upper Arm 1 day         Peripheral IV - Single  Lumen 09/20/23 0900 20 G Posterior;Right Hand 1 day                     Physical Exam  Constitutional:       General: He is not in acute distress.     Appearance: He is normal weight.   HENT:      Head: Normocephalic.   Eyes:      General: No scleral icterus.  Abdominal:      General: Abdomen is flat. Bowel sounds are normal. There is no distension.      Palpations: Abdomen is soft. There is no mass.      Tenderness: There is abdominal tenderness. There is no guarding.   Skin:     General: Skin is warm and dry.      Capillary Refill: Capillary refill takes less than 2 seconds.      Coloration: Skin is not jaundiced or pale.      Findings: No bruising or erythema.   Neurological:      Mental Status: He is alert and oriented to person, place, and time. Mental status is at baseline.          Significant Labs:  CBC:   Recent Labs   Lab 09/20/23  0553 09/21/23  0403   WBC 12.34 14.72*   HGB 12.8* 11.6*   HCT 39.6* 36.4*    139*       CMP:   Recent Labs   Lab 09/20/23  0553   *   CALCIUM 8.7   ALBUMIN 3.1*   PROT 6.0      K 3.8   CO2 26      BUN 6   CREATININE 0.7   ALKPHOS 111   ALT 26   AST 72*   BILITOT 1.1*           Significant Imaging:  Imaging results within the past 24 hours have been reviewed.    EGD 9/18/2023  Impression:            - Corinne-Chauhan tear.                          - Erythematous mucosa in the antrum. Biopsied.                          - Congested duodenal mucosa. Biopsied.   Recommendation:        - [facility].                          - Advance diet as tolerated.                          - Continue present medications.                          - Use Protonix (pantoprazole) 40 mg PO BID.                          - Scheduled anti-emetics to prevent retching.                          - Since patient is immunocompromsied and having                          diarrhea, agree with stool studies (ova/parasite,                           Giardia/cryptosporidium, calprotectin).                          - Outpatient colonoscopy.                          - Await pathology results.   Attending Participation:        I was present and participated during the entire procedure,        including non-naqvi portions.   Kg Cleaning MD   9/18/2023 12:24:43 PM

## 2023-09-21 NOTE — ASSESSMENT & PLAN NOTE
- Interval history and physical exam findings as described above  - Imaging reviewed  - IVF provided  - Increasing diet as tolerated  - GI following  - PRN analgesics provided

## 2023-09-21 NOTE — ASSESSMENT & PLAN NOTE
"- PCR not detected.  - CT abdomen with "hepatomegaly with geographic areas of hepatic steatosis and areas of relative fatty sparing."  - Will need to see hepatology outpatient  "

## 2023-09-21 NOTE — NURSING
Patient has Temp 100.3. HR 120s sustaining.  On call provider ESPINOZA HANSEN notified. New orders give for tylenol and LR 500ml Bolus. See MAR. Will continue to monitor. Call light in reach.

## 2023-09-21 NOTE — PROGRESS NOTES
[facility]  Gastroenterology  Progress Note    Patient Name: Tasia Cardona  MRN: [id number]  Admission Date: 9/17/2023  Hospital Length of Stay: 1 days  Code Status: Full Code   Attending Provider: Jimbo Flor MD  Consulting Provider: Roxy Adams NP  Primary Care Physician: Brittany, Primary Doctor  Principal Problem: Acute necrotizing pancreatitis    Subjective:     Interval History: Followed up with patient s/p EGD 9/18 with mild gastritis and brandy hitchcock tear. STAT CT abdomen with contrast completed yesterday revealed necrotizing pancreatitis with possible splenic thrombus. Thrombus not seen on ultrasound. Hem/Onc weighed in and recommended against anticoagulation at this time. Continuing supportive treatment with IVF, pain control and antiemetics. Patient still reports significant amount of pain, however, his nausea/vomiting have improved. Low grade temp overnight, likely from the pancreatitis. Stool studies ordered for his subjective diarrhea, however patient hasnt had a BM since Saturday.    Review of Systems   Constitutional:  Negative for fatigue and fever.   HENT:  Negative for sore throat.    Respiratory:  Negative for apnea, cough and shortness of breath.    Cardiovascular:  Negative for chest pain, palpitations and leg swelling.   Gastrointestinal:  Positive for abdominal pain, nausea and vomiting. Negative for constipation and diarrhea.   Genitourinary:  Negative for dysuria, frequency and urgency.   Skin:  Negative for rash.     Objective:     Vital Signs (Most Recent):  Temp: 98.4 °F (36.9 °C) (09/21/23 0810)  Pulse: (!) 111 (09/21/23 0810)  Resp: 18 (09/21/23 0822)  BP: 131/83 (09/21/23 0810)  SpO2: (!) 94 % (09/21/23 0810) Vital Signs (24h Range):  Temp:  [97.6 °F (36.4 °C)-100.3 °F (37.9 °C)] 98.4 °F (36.9 °C)  Pulse:  [] 111  Resp:  [16-18] 18  SpO2:  [92 %-96 %] 94 %  BP: (131-140)/(83-89) 131/83     Weight: 75.3 kg (166 lb) (09/18/23 1950)  Body mass index is 23.82  kg/m².      Intake/Output Summary (Last 24 hours) at 9/21/2023 1014  Last data filed at 9/20/2023 1349  Gross per 24 hour   Intake 400 ml   Output 500 ml   Net -100 ml         Lines/Drains/Airways       Peripheral Intravenous Line  Duration                  Peripheral IV - Single Lumen 09/19/23 1600 22 G Anterior;Proximal;Right Upper Arm 1 day         Peripheral IV - Single Lumen 09/20/23 0900 20 G Posterior;Right Hand 1 day                     Physical Exam  Constitutional:       General: He is not in acute distress.     Appearance: He is normal weight.   HENT:      Head: Normocephalic.   Eyes:      General: No scleral icterus.  Abdominal:      General: Abdomen is flat. Bowel sounds are normal. There is no distension.      Palpations: Abdomen is soft. There is no mass.      Tenderness: There is abdominal tenderness. There is no guarding.   Skin:     General: Skin is warm and dry.      Capillary Refill: Capillary refill takes less than 2 seconds.      Coloration: Skin is not jaundiced or pale.      Findings: No bruising or erythema.   Neurological:      Mental Status: He is alert and oriented to person, place, and time. Mental status is at baseline.          Significant Labs:  CBC:   Recent Labs   Lab 09/20/23  0553 09/21/23  0403   WBC 12.34 14.72*   HGB 12.8* 11.6*   HCT 39.6* 36.4*    139*       CMP:   Recent Labs   Lab 09/20/23  0553   *   CALCIUM 8.7   ALBUMIN 3.1*   PROT 6.0      K 3.8   CO2 26      BUN 6   CREATININE 0.7   ALKPHOS 111   ALT 26   AST 72*   BILITOT 1.1*           Significant Imaging:  Imaging results within the past 24 hours have been reviewed.    EGD 9/18/2023  Impression:            - Corinne-Chauhan tear.                          - Erythematous mucosa in the antrum. Biopsied.                          - Congested duodenal mucosa. Biopsied.   Recommendation:        - [facility] for [facility].                          - Advance diet as tolerated.                           - Continue present medications.                          - Use Protonix (pantoprazole) 40 mg PO BID.                          - Scheduled anti-emetics to prevent retching.                          - Since patient is immunocompromsied and having                          diarrhea, agree with stool studies (ova/parasite,                          Giardia/cryptosporidium, calprotectin).                          - Outpatient colonoscopy.                          - Await pathology results.   Attending Participation:        I was present and participated during the entire procedure,        including non-naqvi portions.   Kg Cleaning MD   9/18/2023 12:24:43 PM     Assessment/Plan:     GI  Epigastric pain  -mIVF  -Antiemetics and pain control  -Hem Onc recs on anticoagulation. Recommended outpatient repeat US   - Clears as tolerated        Thank you for your consult. I will follow-up with patient. Please contact us if you have any additional questions.    Roxy Adams NP  Gastroenterology  [facility]

## 2023-09-21 NOTE — ASSESSMENT & PLAN NOTE
- Continue HAART  - Repeat CD4 pending  - CMV serology positive   - Hep C Ab pos, PCR neg  - Stool studies if able to collect   - ID following

## 2023-09-21 NOTE — ASSESSMENT & PLAN NOTE
- WBC elevation and tachycardia noted into 9/21  - Night team started rocephin and flagyl  - ID consulted for assistance

## 2023-09-21 NOTE — PROGRESS NOTES
"[facility] [address] [place] [address] Medicine  Progress Note    Patient Name: Tasia Cardona  MRN: [id number]  Patient Class: IP- Inpatient   Admission Date: 9/17/2023  Length of Stay: 1 days  Attending Physician: Jimbo Flor MD  Primary Care Provider: Brittany, Primary Doctor        Subjective:     Principal Problem:Acute necrotizing pancreatitis        HPI:  Tasia Cardona is a 23 y.o. male with a PMHx of HIV, Asthma, and polycythemia vera presenting with a c/o facial pain and hematemesis. The patient states that the vomiting began 4 days ago and was initially mild but quickly progressed to hematemesis. States that he sees bright red blood sometimes clots each time he vomits. Today he is had 5-6 episodes prior to presentation in the emergency department. He feels weak, admits abdominal discomfort and significant nausea. He attempted to take ibuprofen to help with his symptoms however this did not have any benefit he reported. Patient has not been able to tolerate any oral intake for the past 2 days given the amount of nausea. Additionally patient admits significant jaw pain primarily on the left near the TMJ junction. This has began progressively with the episodes of vomiting. He also notes he has been having watery diarrhea with 1-2 episodes of dark "clot-like" bowel movements for several months. He denies any LOC, syncope, or fatigue. Pt was recently seen by colorectal surgery (9/12/23) for similar complaints and was prescribed a PPI and antiemetic medication, as well as given a GI referral for EGD/colonoscopy.    In ED VSS, afebrile. CBC with mild anemia. PT/INR WNL. Bicarb 20. Calcium 8.5. AST 63. Lipase 12. Hepatitis C Ab reactive, hepatitis C quantitative PCR in process. CXR-Cardiomediastinal silhouette is not enlarged. No focal consolidation. No sizable pleural effusion. No pneumothorax. Given 80mg protonix, 4mg morhpine, & 4mg zofran IVP.       Overview/Hospital Course:  Tasia Cardona was placed in  " observation for workup of GI bleeding. Hgb 11.1, below baseline. GI consulted and performed EGD revealing Corinne-Chauhan tear, Erythematous mucosa in the antrum (bx taken), and Congested duodenal mucosa (bx taken). CLD. Antiemetics scheduled to prevent retching. Continue protonix BID (IV while unable to tolerate PO). Pt with new severe epigastric pain on the day following the EGD. IV pain control, making adjustments as needed. Lipase now elevated to 263 (12 on admit). Begin tx for pancreatitis with aggressive IVF. 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 Heme consult for assistance with CMV serology reactive, discussing tx recs with ID. Repeat CD4 pending.  Collect stool studies if able.  DC when medically stable with plan for outpatient colonoscopy and GI f/u. Path results pending. Heme/onc recommended against AC of splenic thrombosis.    Pt noted to become tachycardic with increased WBCs on 9/21. ID consulted for assistance.      Interval History: Pt seen and examined this morning on eryn ARTEAGA. Abdominal pain persists, able to tolerate some of his CLD. Discussed need for ID consult today. Care plan reviewed. Otherwise, doing well and with no further complaints at this time.      Objective:     Vital Signs (Most Recent):  Temp: 98.4 °F (36.9 °C) (09/21/23 1139)  Pulse: 104 (09/21/23 1139)  Resp: 18 (09/21/23 1139)  BP: 126/78 (09/21/23 1139)  SpO2: (!) 93 % (09/21/23 1139) Vital Signs (24h Range):  Temp:  [98.4 °F (36.9 °C)-100.3 °F (37.9 °C)] 98.4 °F (36.9 °C)  Pulse:  [102-130] 104  Resp:  [18] 18  SpO2:  [92 %-96 %] 93 %  BP: (126-140)/(78-89) 126/78     Weight: 75.3 kg (166 lb)  Body mass index is 23.82 kg/m².    Intake/Output Summary (Last 24 hours) at 9/21/2023 1249  Last data filed at 9/20/2023 1349  Gross per 24 hour   Intake --   Output 500 ml   Net -500 ml         Physical Exam  Gen: in NAD, appears stated age  Neuro: AAOx4, CN2-12  "grossly intact BL; motor, sensory, and strength grossly intact BL  HEENT: NTNC, EOMI, PERRLA, MMM; no thyromegaly or lymphadenopathy; no JVD appreciated  CVS: RRR, no m/r/g; S1/S2 auscultated with no S3 or S4; capillary refill < 2 sec  Resp: lungs CTAB, no w/r/r; no belabored breathing or accessory muscle use appreciated   Abd: BS+ in all 4 quadrants; diffusely TTP, guarding present; soft to palpation; no organomegaly appreciated   Extrem: pulses full, equal, and regular over all 4 extremities; no UE or LE edema BL      Significant Labs: All pertinent labs within the past 24 hours have been reviewed.    Significant Imaging: I have reviewed all pertinent imaging results/findings within the past 24 hours.      Assessment/Plan:      * Acute necrotizing pancreatitis  - Interval history and physical exam findings as described above  - Imaging reviewed  - IVF provided  - Increasing diet as tolerated  - GI following  - PRN analgesics provided    Epigastric pain  - As above      Leukocytosis  - WBC elevation and tachycardia noted into 9/21  - Night team started rocephin and flagyl  - ID consulted for assistance    Splenic vein thrombosis  - Noted on imaging  - Heme/onc consulted: no AC at this time    Corinne-Chauhan tear  - S/p EGD with GI  - Continue PPI    HIV infection  - Continue HAART  - Repeat CD4 pending  - CMV serology positive   - Hep C Ab pos, PCR neg  - Stool studies if able to collect   - ID following    Mild intermittent asthma without complication  - Currently asymptomatic, not in acute exacerbation  - PRN duonebs provided    Polycythemia  - Chronic issue, stable  - Follows with hematology outpatient    Hepatitis C antibody positive in blood  - PCR not detected.  - CT abdomen with "hepatomegaly with geographic areas of hepatic steatosis and areas of relative fatty sparing."  - Will need to see hepatology outpatient    Normocytic anemia  - H/H stable near baseline  - Will continue to monitor on daily " labs      VTE Risk Mitigation (From admission, onward)         Ordered     Place sequential compression device  Until discontinued         09/17/23 2307     IP VTE LOW RISK PATIENT  Once         09/17/23 2307                Discharge Planning   CASTILLO: 9/23/2023     Code Status: Full Code   Is the patient medically ready for discharge?: No    Reason for patient still in hospital (select all that apply): Patient trending condition  Discharge Plan A: Home   Discharge Delays: None known at this time        Jimbo Flor MD  Attending Physician  Medical Director - [facility]  Department of Hospital Medicine  9/21/2023

## 2023-09-21 NOTE — PLAN OF CARE
Problem: Adult Inpatient Plan of Care  Goal: Plan of Care Review  Outcome: Ongoing, Progressing  Goal: Absence of Hospital-Acquired Illness or Injury  Outcome: Ongoing, Progressing  Goal: Readiness for Transition of Care  Outcome: Ongoing, Progressing     Problem: Adjustment to Illness (Gastrointestinal Bleeding)  Goal: Optimal Coping with Acute Illness  Outcome: Ongoing, Progressing     Problem: Bleeding (Gastrointestinal Bleeding)  Goal: Hemostasis  Outcome: Ongoing, Progressing     Problem: Infection  Goal: Absence of Infection Signs and Symptoms  Outcome: Ongoing, Progressing     Problem: HIV/AIDS Infection  Goal: HIV/AIDS Symptom Control  Outcome: Ongoing, Progressing     Problem: Anemia  Goal: Anemia Symptom Improvement  Outcome: Ongoing, Progressing     Problem: Pain Acute  Goal: Acceptable Pain Control and Functional Ability  Outcome: Ongoing, Progressing     Problem: Bowel Elimination/Continence Impairment  Goal: Effective Bowel Elimination/Continence  Outcome: Ongoing, Progressing

## 2023-09-21 NOTE — ASSESSMENT & PLAN NOTE
22 yo male with HIV, HCV (in SVR), and PCV who presents with GI symptoms for over 4 months. HIV is well controlled on Biktarvy.    Recommend:  No change in HAART - continue Biktarvy for now.

## 2023-09-21 NOTE — ASSESSMENT & PLAN NOTE
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 No evidence of immune compromise or CMV infection. Awaiting biopsies and current CD4 count, but CMV is very unlikely to cause his symptoms, especially without any evidence of CMV lesions in the esophagus. No indication at this time to perform special studies or PCR. Awaiting biopsies for confirmation.

## 2023-09-21 NOTE — NURSING
Patient is HR rate is  in  [id number]. unsustained Sinus tachycardia noted on telemetry monitor. On call Provider Tracey NP notified. Orders given to monitor. Patient resting quietly in bed. LR infusing at 150ml/hr. No acute distress noted. Call lignt in reach

## 2023-09-21 NOTE — ASSESSMENT & PLAN NOTE
Pancreatitis may be related to splenic vein thrombosis - as may the other symptoms. Defer to hematology, as this may be related to PCV. No evidence that this is related to HIV or Biktarvy at this time.

## 2023-09-22 PROBLEM — R07.89 OTHER CHEST PAIN: Status: ACTIVE | Noted: 2023-09-22

## 2023-09-22 PROBLEM — D72.829 LEUKOCYTOSIS: Status: RESOLVED | Noted: 2023-09-21 | Resolved: 2023-09-22

## 2023-09-22 LAB
ALBUMIN SERPL BCP-MCNC: 2 G/DL (ref 3.5–5.2)
ALP SERPL-CCNC: 87 U/L (ref 55–135)
ALT SERPL W/O P-5'-P-CCNC: 13 U/L (ref 10–44)
ANION GAP SERPL CALC-SCNC: 9 MMOL/L (ref 8–16)
AST SERPL-CCNC: 27 U/L (ref 10–40)
BASOPHILS # BLD AUTO: 0.03 K/UL (ref 0–0.2)
BASOPHILS NFR BLD: 0.3 % (ref 0–1.9)
BILIRUB SERPL-MCNC: 0.9 MG/DL (ref 0.1–1)
BILIRUB UR QL STRIP: NEGATIVE
BUN SERPL-MCNC: 4 MG/DL (ref 6–20)
CALCIUM SERPL-MCNC: 7.8 MG/DL (ref 8.7–10.5)
CHLORIDE SERPL-SCNC: 96 MMOL/L (ref 95–110)
CLARITY UR REFRACT.AUTO: CLEAR
CO2 SERPL-SCNC: 27 MMOL/L (ref 23–29)
COLOR UR AUTO: YELLOW
CREAT SERPL-MCNC: 0.6 MG/DL (ref 0.5–1.4)
DIFFERENTIAL METHOD: ABNORMAL
EOSINOPHIL # BLD AUTO: 0 K/UL (ref 0–0.5)
EOSINOPHIL NFR BLD: 0.3 % (ref 0–8)
ERYTHROCYTE [DISTWIDTH] IN BLOOD BY AUTOMATED COUNT: 19.6 % (ref 11.5–14.5)
EST. GFR  (NO RACE VARIABLE): >60 ML/MIN/1.73 M^2
GLUCOSE SERPL-MCNC: 88 MG/DL (ref 70–110)
GLUCOSE UR QL STRIP: NEGATIVE
HCT VFR BLD AUTO: 32.3 % (ref 40–54)
HGB BLD-MCNC: 10.4 G/DL (ref 14–18)
HGB UR QL STRIP: NEGATIVE
IMM GRANULOCYTES # BLD AUTO: 0.1 K/UL (ref 0–0.04)
IMM GRANULOCYTES NFR BLD AUTO: 0.8 % (ref 0–0.5)
KETONES UR QL STRIP: ABNORMAL
LEUKOCYTE ESTERASE UR QL STRIP: NEGATIVE
LYMPHOCYTES # BLD AUTO: 1.2 K/UL (ref 1–4.8)
LYMPHOCYTES NFR BLD: 9.9 % (ref 18–48)
MAGNESIUM SERPL-MCNC: 1.4 MG/DL (ref 1.6–2.6)
MCH RBC QN AUTO: 30 PG (ref 27–31)
MCHC RBC AUTO-ENTMCNC: 32.2 G/DL (ref 32–36)
MCV RBC AUTO: 93 FL (ref 82–98)
MONOCYTES # BLD AUTO: 1.9 K/UL (ref 0.3–1)
MONOCYTES NFR BLD: 15.7 % (ref 4–15)
NEUTROPHILS # BLD AUTO: 8.6 K/UL (ref 1.8–7.7)
NEUTROPHILS NFR BLD: 73 % (ref 38–73)
NITRITE UR QL STRIP: NEGATIVE
NRBC BLD-RTO: 0 /100 WBC
PH UR STRIP: 7 [PH] (ref 5–8)
PHOSPHATE SERPL-MCNC: 1.8 MG/DL (ref 2.7–4.5)
PLATELET # BLD AUTO: 151 K/UL (ref 150–450)
PMV BLD AUTO: 10.8 FL (ref 9.2–12.9)
POTASSIUM SERPL-SCNC: 2.6 MMOL/L (ref 3.5–5.1)
PROT SERPL-MCNC: 5 G/DL (ref 6–8.4)
PROT UR QL STRIP: NEGATIVE
RBC # BLD AUTO: 3.47 M/UL (ref 4.6–6.2)
SODIUM SERPL-SCNC: 132 MMOL/L (ref 136–145)
SP GR UR STRIP: 1 (ref 1–1.03)
URN SPEC COLLECT METH UR: ABNORMAL
WBC # BLD AUTO: 11.81 K/UL (ref 3.9–12.7)
WBC #/AREA STL HPF: NORMAL /[HPF]

## 2023-09-22 PROCEDURE — 80053 COMPREHEN METABOLIC PANEL: CPT | Performed by: STUDENT IN AN ORGANIZED HEALTH CARE EDUCATION/TRAINING PROGRAM

## 2023-09-22 PROCEDURE — 63600175 PHARM REV CODE 636 W HCPCS

## 2023-09-22 PROCEDURE — 99233 SBSQ HOSP IP/OBS HIGH 50: CPT | Mod: ,,, | Performed by: STUDENT IN AN ORGANIZED HEALTH CARE EDUCATION/TRAINING PROGRAM

## 2023-09-22 PROCEDURE — 25000003 PHARM REV CODE 250

## 2023-09-22 PROCEDURE — 87425 ROTAVIRUS AG IA: CPT | Performed by: NURSE PRACTITIONER

## 2023-09-22 PROCEDURE — C9113 INJ PANTOPRAZOLE SODIUM, VIA: HCPCS

## 2023-09-22 PROCEDURE — 99232 PR SUBSEQUENT HOSPITAL CARE,LEVL II: ICD-10-PCS | Mod: ,,,

## 2023-09-22 PROCEDURE — 87045 FECES CULTURE AEROBIC BACT: CPT | Performed by: NURSE PRACTITIONER

## 2023-09-22 PROCEDURE — 83993 ASSAY FOR CALPROTECTIN FECAL: CPT | Performed by: NURSE PRACTITIONER

## 2023-09-22 PROCEDURE — 83735 ASSAY OF MAGNESIUM: CPT | Performed by: STUDENT IN AN ORGANIZED HEALTH CARE EDUCATION/TRAINING PROGRAM

## 2023-09-22 PROCEDURE — 87209 SMEAR COMPLEX STAIN: CPT | Performed by: NURSE PRACTITIONER

## 2023-09-22 PROCEDURE — 87427 SHIGA-LIKE TOXIN AG IA: CPT | Mod: 59 | Performed by: NURSE PRACTITIONER

## 2023-09-22 PROCEDURE — 85025 COMPLETE CBC W/AUTO DIFF WBC: CPT | Performed by: STUDENT IN AN ORGANIZED HEALTH CARE EDUCATION/TRAINING PROGRAM

## 2023-09-22 PROCEDURE — 87046 STOOL CULTR AEROBIC BACT EA: CPT | Performed by: NURSE PRACTITIONER

## 2023-09-22 PROCEDURE — 89055 LEUKOCYTE ASSESSMENT FECAL: CPT | Performed by: NURSE PRACTITIONER

## 2023-09-22 PROCEDURE — 87338 HPYLORI STOOL AG IA: CPT | Performed by: NURSE PRACTITIONER

## 2023-09-22 PROCEDURE — 25000003 PHARM REV CODE 250: Performed by: STUDENT IN AN ORGANIZED HEALTH CARE EDUCATION/TRAINING PROGRAM

## 2023-09-22 PROCEDURE — 21400001 HC TELEMETRY ROOM

## 2023-09-22 PROCEDURE — 99232 SBSQ HOSP IP/OBS MODERATE 35: CPT | Mod: ,,,

## 2023-09-22 PROCEDURE — 87449 NOS EACH ORGANISM AG IA: CPT | Performed by: NURSE PRACTITIONER

## 2023-09-22 PROCEDURE — 25500020 PHARM REV CODE 255: Performed by: STUDENT IN AN ORGANIZED HEALTH CARE EDUCATION/TRAINING PROGRAM

## 2023-09-22 PROCEDURE — 63600175 PHARM REV CODE 636 W HCPCS: Performed by: STUDENT IN AN ORGANIZED HEALTH CARE EDUCATION/TRAINING PROGRAM

## 2023-09-22 PROCEDURE — 25000003 PHARM REV CODE 250: Performed by: NURSE PRACTITIONER

## 2023-09-22 PROCEDURE — 81003 URINALYSIS AUTO W/O SCOPE: CPT

## 2023-09-22 PROCEDURE — 87329 GIARDIA AG IA: CPT | Performed by: NURSE PRACTITIONER

## 2023-09-22 PROCEDURE — 99233 PR SUBSEQUENT HOSPITAL CARE,LEVL III: ICD-10-PCS | Mod: ,,, | Performed by: STUDENT IN AN ORGANIZED HEALTH CARE EDUCATION/TRAINING PROGRAM

## 2023-09-22 PROCEDURE — 99233 PR SUBSEQUENT HOSPITAL CARE,LEVL III: ICD-10-PCS | Mod: ,,, | Performed by: INTERNAL MEDICINE

## 2023-09-22 PROCEDURE — 99233 SBSQ HOSP IP/OBS HIGH 50: CPT | Mod: ,,, | Performed by: INTERNAL MEDICINE

## 2023-09-22 PROCEDURE — 84100 ASSAY OF PHOSPHORUS: CPT | Performed by: STUDENT IN AN ORGANIZED HEALTH CARE EDUCATION/TRAINING PROGRAM

## 2023-09-22 PROCEDURE — 87507 IADNA-DNA/RNA PROBE TQ 12-25: CPT | Performed by: STUDENT IN AN ORGANIZED HEALTH CARE EDUCATION/TRAINING PROGRAM

## 2023-09-22 PROCEDURE — 36415 COLL VENOUS BLD VENIPUNCTURE: CPT | Performed by: STUDENT IN AN ORGANIZED HEALTH CARE EDUCATION/TRAINING PROGRAM

## 2023-09-22 RX ORDER — MAGNESIUM SULFATE HEPTAHYDRATE 40 MG/ML
2 INJECTION, SOLUTION INTRAVENOUS ONCE
Status: DISCONTINUED | OUTPATIENT
Start: 2023-09-22 | End: 2023-09-22

## 2023-09-22 RX ORDER — POLYETHYLENE GLYCOL 3350 17 G/17G
17 POWDER, FOR SOLUTION ORAL DAILY
Status: DISCONTINUED | OUTPATIENT
Start: 2023-09-22 | End: 2023-09-25 | Stop reason: HOSPADM

## 2023-09-22 RX ORDER — MORPHINE SULFATE 4 MG/ML
4 INJECTION, SOLUTION INTRAMUSCULAR; INTRAVENOUS ONCE
Status: COMPLETED | OUTPATIENT
Start: 2023-09-22 | End: 2023-09-22

## 2023-09-22 RX ORDER — POTASSIUM CHLORIDE 20 MEQ/1
40 TABLET, EXTENDED RELEASE ORAL
Status: COMPLETED | OUTPATIENT
Start: 2023-09-22 | End: 2023-09-22

## 2023-09-22 RX ORDER — BISACODYL 5 MG
5 TABLET, DELAYED RELEASE (ENTERIC COATED) ORAL DAILY PRN
Status: DISCONTINUED | OUTPATIENT
Start: 2023-09-22 | End: 2023-09-25 | Stop reason: HOSPADM

## 2023-09-22 RX ORDER — POTASSIUM CHLORIDE 7.45 MG/ML
10 INJECTION INTRAVENOUS
Status: DISCONTINUED | OUTPATIENT
Start: 2023-09-22 | End: 2023-09-22

## 2023-09-22 RX ORDER — MAGNESIUM SULFATE HEPTAHYDRATE 40 MG/ML
4 INJECTION, SOLUTION INTRAVENOUS ONCE
Status: COMPLETED | OUTPATIENT
Start: 2023-09-22 | End: 2023-09-22

## 2023-09-22 RX ORDER — POTASSIUM CHLORIDE 7.45 MG/ML
40 INJECTION INTRAVENOUS ONCE
Status: COMPLETED | OUTPATIENT
Start: 2023-09-22 | End: 2023-09-22

## 2023-09-22 RX ADMIN — ACETAMINOPHEN 650 MG: 325 TABLET ORAL at 07:09

## 2023-09-22 RX ADMIN — HYDROMORPHONE HYDROCHLORIDE 0.5 MG: 1 INJECTION, SOLUTION INTRAMUSCULAR; INTRAVENOUS; SUBCUTANEOUS at 08:09

## 2023-09-22 RX ADMIN — PANTOPRAZOLE SODIUM 40 MG: 40 INJECTION, POWDER, FOR SOLUTION INTRAVENOUS at 08:09

## 2023-09-22 RX ADMIN — CEFTRIAXONE 1 G: 1 INJECTION, POWDER, FOR SOLUTION INTRAMUSCULAR; INTRAVENOUS at 03:09

## 2023-09-22 RX ADMIN — HYDROMORPHONE HYDROCHLORIDE 1 MG: 1 INJECTION, SOLUTION INTRAMUSCULAR; INTRAVENOUS; SUBCUTANEOUS at 03:09

## 2023-09-22 RX ADMIN — HYDROMORPHONE HYDROCHLORIDE 0.5 MG: 1 INJECTION, SOLUTION INTRAMUSCULAR; INTRAVENOUS; SUBCUTANEOUS at 12:09

## 2023-09-22 RX ADMIN — POTASSIUM CHLORIDE 40 MEQ: 1500 TABLET, EXTENDED RELEASE ORAL at 10:09

## 2023-09-22 RX ADMIN — POTASSIUM CHLORIDE 40 MEQ: 7.46 INJECTION, SOLUTION INTRAVENOUS at 08:09

## 2023-09-22 RX ADMIN — HYDROMORPHONE HYDROCHLORIDE 1 MG: 1 INJECTION, SOLUTION INTRAMUSCULAR; INTRAVENOUS; SUBCUTANEOUS at 09:09

## 2023-09-22 RX ADMIN — MAGNESIUM SULFATE 4 G: 2 INJECTION INTRAVENOUS at 08:09

## 2023-09-22 RX ADMIN — HYDROMORPHONE HYDROCHLORIDE 0.5 MG: 1 INJECTION, SOLUTION INTRAMUSCULAR; INTRAVENOUS; SUBCUTANEOUS at 07:09

## 2023-09-22 RX ADMIN — METRONIDAZOLE 500 MG: 5 INJECTION, SOLUTION INTRAVENOUS at 06:09

## 2023-09-22 RX ADMIN — PROCHLORPERAZINE EDISYLATE 5 MG: 5 INJECTION INTRAMUSCULAR; INTRAVENOUS at 06:09

## 2023-09-22 RX ADMIN — METRONIDAZOLE 500 MG: 5 INJECTION, SOLUTION INTRAVENOUS at 03:09

## 2023-09-22 RX ADMIN — SODIUM CHLORIDE, POTASSIUM CHLORIDE, SODIUM LACTATE AND CALCIUM CHLORIDE: 600; 310; 30; 20 INJECTION, SOLUTION INTRAVENOUS at 03:09

## 2023-09-22 RX ADMIN — BICTEGRAVIR SODIUM, EMTRICITABINE, AND TENOFOVIR ALAFENAMIDE FUMARATE 1 TABLET: 50; 200; 25 TABLET ORAL at 08:09

## 2023-09-22 RX ADMIN — METRONIDAZOLE 500 MG: 5 INJECTION, SOLUTION INTRAVENOUS at 10:09

## 2023-09-22 RX ADMIN — FOLIC ACID 1 MG: 1 TABLET ORAL at 08:09

## 2023-09-22 RX ADMIN — PROCHLORPERAZINE EDISYLATE 5 MG: 5 INJECTION INTRAMUSCULAR; INTRAVENOUS at 05:09

## 2023-09-22 RX ADMIN — SODIUM PHOSPHATE, MONOBASIC, MONOHYDRATE AND SODIUM PHOSPHATE, DIBASIC, ANHYDROUS 30 MMOL: 142; 276 INJECTION, SOLUTION INTRAVENOUS at 10:09

## 2023-09-22 RX ADMIN — POLYETHYLENE GLYCOL 3350 17 G: 17 POWDER, FOR SOLUTION ORAL at 10:09

## 2023-09-22 RX ADMIN — HYDROMORPHONE HYDROCHLORIDE 1 MG: 1 INJECTION, SOLUTION INTRAMUSCULAR; INTRAVENOUS; SUBCUTANEOUS at 10:09

## 2023-09-22 RX ADMIN — PANTOPRAZOLE SODIUM 40 MG: 40 INJECTION, POWDER, FOR SOLUTION INTRAVENOUS at 09:09

## 2023-09-22 RX ADMIN — PROCHLORPERAZINE EDISYLATE 5 MG: 5 INJECTION INTRAMUSCULAR; INTRAVENOUS at 12:09

## 2023-09-22 RX ADMIN — HYDROMORPHONE HYDROCHLORIDE 0.5 MG: 1 INJECTION, SOLUTION INTRAMUSCULAR; INTRAVENOUS; SUBCUTANEOUS at 06:09

## 2023-09-22 RX ADMIN — POTASSIUM CHLORIDE 40 MEQ: 1500 TABLET, EXTENDED RELEASE ORAL at 08:09

## 2023-09-22 RX ADMIN — HYDROMORPHONE HYDROCHLORIDE 1 MG: 1 INJECTION, SOLUTION INTRAMUSCULAR; INTRAVENOUS; SUBCUTANEOUS at 05:09

## 2023-09-22 RX ADMIN — IOHEXOL 75 ML: 350 INJECTION, SOLUTION INTRAVENOUS at 04:09

## 2023-09-22 RX ADMIN — MORPHINE SULFATE 4 MG: 4 INJECTION INTRAVENOUS at 06:09

## 2023-09-22 NOTE — NURSING
Nurse called CT to check the ETA of the patient's stat CTA. CT states they will put the patient in transport.

## 2023-09-22 NOTE — SUBJECTIVE & OBJECTIVE
Interval History: Pt seen and examined this morning on eryn ARTEAGA. Abdominal pain worse last night, though has calmed down somewhat this morning. Pt is very eager to attempt advancing diet, and has requested trying a regular diet to see how he can tolerate it. Otherwise, doing well and with no further complaints at this time.      Objective:     Vital Signs (Most Recent):  Temp: 98.8 °F (37.1 °C) (09/22/23 1145)  Pulse: 110 (09/22/23 1146)  Resp: 18 (09/22/23 1244)  BP: (!) 143/75 (09/22/23 1145)  SpO2: 96 % (09/22/23 1145) Vital Signs (24h Range):  Temp:  [97.8 °F (36.6 °C)-98.9 °F (37.2 °C)] 98.8 °F (37.1 °C)  Pulse:  [] 110  Resp:  [14-18] 18  SpO2:  [93 %-98 %] 96 %  BP: (128-143)/(75-89) 143/75     Weight: 75.3 kg (166 lb)  Body mass index is 23.82 kg/m².  No intake or output data in the 24 hours ending 09/22/23 1257        Physical Exam  Gen: in NAD, appears stated age  Neuro: AAOx4, CN2-12 grossly intact BL; motor, sensory, and strength grossly intact BL  HEENT: NTNC, EOMI, PERRLA, MMM; no thyromegaly or lymphadenopathy; no JVD appreciated  CVS: RRR, no m/r/g; S1/S2 auscultated with no S3 or S4; capillary refill < 2 sec  Resp: lungs CTAB, no w/r/r; no belabored breathing or accessory muscle use appreciated   Abd: BS+ in all 4 quadrants; diffusely TTP, guarding present; soft to palpation; no organomegaly appreciated   Extrem: pulses full, equal, and regular over all 4 extremities; no UE or LE edema BL      Significant Labs: All pertinent labs within the past 24 hours have been reviewed.    Significant Imaging: I have reviewed all pertinent imaging results/findings within the past 24 hours.

## 2023-09-22 NOTE — NURSING
Patient resting quietly in bed. Easy to arouse by verbal stimuli. Patient stated that he feeling a lot better. Pain is a 7/10. Will continue to monitolr

## 2023-09-22 NOTE — SUBJECTIVE & OBJECTIVE
Interval History: Complains of back pain, shortness of breath. Still has abdominal pain. No vomiting.     Review of Systems   Constitutional:  Negative for fever.   Respiratory:  Positive for shortness of breath. Negative for cough.    Gastrointestinal:  Negative for diarrhea, nausea and vomiting.   Musculoskeletal:  Positive for back pain.   All other systems reviewed and are negative.    Objective:     Vital Signs (Most Recent):  Temp: 98.5 °F (36.9 °C) (09/22/23 0729)  Pulse: 109 (09/22/23 0729)  Resp: 16 (09/22/23 0823)  BP: 128/81 (09/22/23 0729)  SpO2: (!) 94 % (09/22/23 0729) Vital Signs (24h Range):  Temp:  [97.8 °F (36.6 °C)-98.9 °F (37.2 °C)] 98.5 °F (36.9 °C)  Pulse:  [] 109  Resp:  [14-18] 16  SpO2:  [93 %-98 %] 94 %  BP: (126-142)/(78-89) 128/81     Weight: 75.3 kg (166 lb)  Body mass index is 23.82 kg/m².    Estimated Creatinine Clearance: 196 mL/min (based on SCr of 0.6 mg/dL).     Physical Exam  Vitals and nursing note reviewed.   Constitutional:       Appearance: Normal appearance.   HENT:      Head: Normocephalic and atraumatic.   Eyes:      Extraocular Movements: Extraocular movements intact.      Conjunctiva/sclera: Conjunctivae normal.      Pupils: Pupils are equal, round, and reactive to light.   Cardiovascular:      Rate and Rhythm: Normal rate and regular rhythm.   Pulmonary:      Effort: Pulmonary effort is normal.      Breath sounds: Normal breath sounds.   Abdominal:      General: Abdomen is flat. Bowel sounds are normal.      Tenderness: There is abdominal tenderness. There is no guarding.   Musculoskeletal:      Right lower leg: No edema.      Left lower leg: No edema.   Skin:     General: Skin is warm and dry.   Neurological:      General: No focal deficit present.      Mental Status: He is alert and oriented to person, place, and time.          Significant Labs: CBC:   Recent Labs   Lab 09/21/23  0403 09/22/23  0505   WBC 14.72* 11.81   HGB 11.6* 10.4*   HCT 36.4* 32.3*   PLT  139* 151     CMP:   Recent Labs   Lab 09/22/23  0505   *   K 2.6*   CL 96   CO2 27   GLU 88   BUN 4*   CREATININE 0.6   CALCIUM 7.8*   PROT 5.0*   ALBUMIN 2.0*   BILITOT 0.9   ALKPHOS 87   AST 27   ALT 13   ANIONGAP 9     Lipase:   Recent Labs   Lab 09/21/23  0403   LIPASE 332*       Significant Imaging: None

## 2023-09-22 NOTE — ASSESSMENT & PLAN NOTE
- Supportive care at this time.  - Optimize bowel regimen to see if this improves patients abdominal pain.  - Consider repeat imaging if pain worsens or persists.

## 2023-09-22 NOTE — ASSESSMENT & PLAN NOTE
24 yo male with HIV, HCV (in SVR), and PCV who presents with GI symptoms for over 4 months. HIV is well controlled on Biktarvy.    Recommend:  No change in HAART - continue Biktarvy for now.

## 2023-09-22 NOTE — ASSESSMENT & PLAN NOTE
Patient currently complains of SOB and pleuritic chest pain. In the setting of thrombosis, suggest CT of chest for possible embolism.

## 2023-09-22 NOTE — PROGRESS NOTES
[facility]  Gastroenterology  Progress Note    Patient Name: Tasia Cardona  MRN: [id number]  Admission Date: 9/17/2023  Hospital Length of Stay: 2 days  Code Status: Full Code   Attending Provider: Jimbo Flor MD  Consulting Provider: Roxy Adams NP  Primary Care Physician: Brittany, Primary Doctor  Principal Problem: Acute necrotizing pancreatitis    Subjective:     Interval History: Followed up with patient s/p EGD 9/18 with mild gastritis and brandy hitchcock tear. STAT CT abdomen with contrast completed 9/20 revealed necrotizing pancreatitis with possible splenic thrombus. Thrombus not seen on ultrasound. Hem/Onc weighed in and recommended against anticoagulation at this time. Continuing supportive treatment with IVF, pain control and antiemetics. Patient still reports significant amount of pain, however, his nausea/vomiting have improved. Stool studies ordered for his subjective diarrhea, however patient hasnt had a BM since Saturday.     Review of Systems   Constitutional:  Negative for fatigue and fever.   HENT:  Negative for sore throat.    Respiratory:  Negative for apnea, cough and shortness of breath.    Cardiovascular:  Negative for chest pain, palpitations and leg swelling.   Gastrointestinal:  Positive for abdominal pain. Negative for constipation, diarrhea, nausea and vomiting.   Genitourinary:  Negative for dysuria, frequency and urgency.   Skin:  Negative for rash.     Objective:     Vital Signs (Most Recent):  Temp: 98.5 °F (36.9 °C) (09/22/23 0729)  Pulse: 109 (09/22/23 0729)  Resp: 16 (09/22/23 0823)  BP: 128/81 (09/22/23 0729)  SpO2: (!) 94 % (09/22/23 0729) Vital Signs (24h Range):  Temp:  [97.8 °F (36.6 °C)-98.9 °F (37.2 °C)] 98.5 °F (36.9 °C)  Pulse:  [] 109  Resp:  [14-18] 16  SpO2:  [93 %-98 %] 94 %  BP: (126-142)/(78-89) 128/81     Weight: 75.3 kg (166 lb) (09/18/23 1950)  Body mass index is 23.82 kg/m².    No intake or output data in the 24 hours ending 09/22/23  0952      Lines/Drains/Airways       Peripheral Intravenous Line  Duration                  Peripheral IV - Single Lumen 09/21/23 1400 Left;Posterior Hand <1 day                     Physical Exam  Constitutional:       General: He is not in acute distress.     Appearance: He is normal weight.   HENT:      Head: Normocephalic.   Eyes:      General: No scleral icterus.  Abdominal:      General: Abdomen is flat. Bowel sounds are normal. There is no distension.      Palpations: Abdomen is soft. There is no mass.      Tenderness: There is generalized abdominal tenderness. There is no guarding.   Skin:     General: Skin is warm and dry.      Capillary Refill: Capillary refill takes less than 2 seconds.      Coloration: Skin is not jaundiced or pale.      Findings: No bruising or erythema.   Neurological:      Mental Status: He is alert and oriented to person, place, and time. Mental status is at baseline.          Significant Labs:  CBC:   Recent Labs   Lab 09/21/23  0403 09/22/23  0505   WBC 14.72* 11.81   HGB 11.6* 10.4*   HCT 36.4* 32.3*   * 151       CMP:   Recent Labs   Lab 09/22/23  0505   GLU 88   CALCIUM 7.8*   ALBUMIN 2.0*   PROT 5.0*   *   K 2.6*   CO2 27   CL 96   BUN 4*   CREATININE 0.6   ALKPHOS 87   ALT 13   AST 27   BILITOT 0.9           Significant Imaging:  Imaging results within the past 24 hours have been reviewed.    EGD 9/18/2023  Impression:            - Corinne-Chauhan tear.                          - Erythematous mucosa in the antrum. Biopsied.                          - Congested duodenal mucosa. Biopsied.   Recommendation:        - [facility].                          - Advance diet as tolerated.                          - Continue present medications.                          - Use Protonix (pantoprazole) 40 mg PO BID.                          - Scheduled anti-emetics to prevent retching.                          - Since patient is  immunocompromsied and having                          diarrhea, agree with stool studies (ova/parasite,                          Giardia/cryptosporidium, calprotectin).                          - Outpatient colonoscopy.                          - Await pathology results.   Attending Participation:        I was present and participated during the entire procedure,        including non-naqvi portions.   Kg Cleaning MD   9/18/2023 12:24:43 PM     Assessment/Plan:     GI  * Acute necrotizing pancreatitis  - Supportive care at this time.  - Optimize bowel regimen to see if this improves patients abdominal pain.  - Consider repeat imaging if pain worsens or persists.    Epigastric pain  - mIVF  - Replete electrolytes  - Continue to monitor abdominal pain. If continued and/or worsened, please consider repeat imaging to evaluate for worsening pancreatitis.  - Antiemetics and pain control  - Hem Onc recs on anticoagulation. Recommended outpatient repeat US   - Advance diet as tolerated        Thank you for your consult. The GI team will continue to follow this patient. Please call if patients condition worsens.    Roxy Adams NP  Gastroenterology  [facility]

## 2023-09-22 NOTE — SUBJECTIVE & OBJECTIVE
Subjective:     Interval History: Followed up with patient s/p EGD 9/18 with mild gastritis and brandy hitchcock tear. STAT CT abdomen with contrast completed 9/20 revealed necrotizing pancreatitis with possible splenic thrombus. Thrombus not seen on ultrasound. Hem/Onc weighed in and recommended against anticoagulation at this time. Continuing supportive treatment with IVF, pain control and antiemetics. Patient still reports significant amount of pain, however, his nausea/vomiting have improved. Stool studies ordered for his subjective diarrhea, however patient hasnt had a BM since Saturday.     Review of Systems   Constitutional:  Negative for fatigue and fever.   HENT:  Negative for sore throat.    Respiratory:  Negative for apnea, cough and shortness of breath.    Cardiovascular:  Negative for chest pain, palpitations and leg swelling.   Gastrointestinal:  Positive for abdominal pain. Negative for constipation, diarrhea, nausea and vomiting.   Genitourinary:  Negative for dysuria, frequency and urgency.   Skin:  Negative for rash.     Objective:     Vital Signs (Most Recent):  Temp: 98.5 °F (36.9 °C) (09/22/23 0729)  Pulse: 109 (09/22/23 0729)  Resp: 16 (09/22/23 0823)  BP: 128/81 (09/22/23 0729)  SpO2: (!) 94 % (09/22/23 0729) Vital Signs (24h Range):  Temp:  [97.8 °F (36.6 °C)-98.9 °F (37.2 °C)] 98.5 °F (36.9 °C)  Pulse:  [] 109  Resp:  [14-18] 16  SpO2:  [93 %-98 %] 94 %  BP: (126-142)/(78-89) 128/81     Weight: 75.3 kg (166 lb) (09/18/23 1950)  Body mass index is 23.82 kg/m².    No intake or output data in the 24 hours ending 09/22/23 0952      Lines/Drains/Airways       Peripheral Intravenous Line  Duration                  Peripheral IV - Single Lumen 09/21/23 1400 Left;Posterior Hand <1 day                     Physical Exam  Constitutional:       General: He is not in acute distress.     Appearance: He is normal weight.   HENT:      Head: Normocephalic.   Eyes:      General: No scleral  icterus.  Abdominal:      General: Abdomen is flat. Bowel sounds are normal. There is no distension.      Palpations: Abdomen is soft. There is no mass.      Tenderness: There is generalized abdominal tenderness. There is no guarding.   Skin:     General: Skin is warm and dry.      Capillary Refill: Capillary refill takes less than 2 seconds.      Coloration: Skin is not jaundiced or pale.      Findings: No bruising or erythema.   Neurological:      Mental Status: He is alert and oriented to person, place, and time. Mental status is at baseline.          Significant Labs:  CBC:   Recent Labs   Lab 09/21/23  0403 09/22/23  0505   WBC 14.72* 11.81   HGB 11.6* 10.4*   HCT 36.4* 32.3*   * 151       CMP:   Recent Labs   Lab 09/22/23  0505   GLU 88   CALCIUM 7.8*   ALBUMIN 2.0*   PROT 5.0*   *   K 2.6*   CO2 27   CL 96   BUN 4*   CREATININE 0.6   ALKPHOS 87   ALT 13   AST 27   BILITOT 0.9           Significant Imaging:  Imaging results within the past 24 hours have been reviewed.    EGD 9/18/2023  Impression:            - Corinne-Chauhan tear.                          - Erythematous mucosa in the antrum. Biopsied.                          - Congested duodenal mucosa. Biopsied.   Recommendation:        - [facility].                          - Advance diet as tolerated.                          - Continue present medications.                          - Use Protonix (pantoprazole) 40 mg PO BID.                          - Scheduled anti-emetics to prevent retching.                          - Since patient is immunocompromsied and having                          diarrhea, agree with stool studies (ova/parasite,                          Giardia/cryptosporidium, calprotectin).                          - Outpatient colonoscopy.                          - Await pathology results.   Attending Participation:        I was present and participated during the entire procedure,         including non-naqvi portions.   Kg Cleaning MD   9/18/2023 12:24:43 PM

## 2023-09-22 NOTE — PROGRESS NOTES
[facility]  Infectious Disease  Progress Note    Patient Name: Tasia Cardona  MRN: [id number]  Admission Date: 9/17/2023  Length of Stay: 2 days  Attending Physician: Jimbo Flor MD  Primary Care Provider: No, Primary Doctor    Isolation Status: No active isolations  Assessment/Plan:      Cardiac/Vascular  Other chest pain  Patient currently complains of SOB and pleuritic chest pain. In the setting of thrombosis, suggest CT of chest for possible embolism.    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 No evidence of immune compromise or CMV infection. Awaiting biopsies and current CD4 count, but CMV is very unlikely to cause his symptoms, especially without any evidence of CMV lesions in the esophagus. No indication at this time to perform special studies or PCR. Awaiting biopsies for confirmation.    HIV infection  22 yo male with HIV, HCV (in SVR), and PCV who presents with GI symptoms for over 4 months. HIV is well controlled on Biktarvy.    Recommend:  No change in HAART - continue Biktarvy for now.    GI  * Acute necrotizing pancreatitis  Pancreatitis may be related to splenic vein thrombosis - as may the other symptoms. Defer to hematology, as this may be related to PCV. No evidence that this is related to HIV or Biktarvy at this time.     Hepatitis C antibody positive in blood  HCV PCR was negative in December 2021. No additional therapy needed at this time.          Thank you for your consult. I will follow-up with patient. Please contact us if you have any additional questions.    Jeremias Herrera MD  Infectious Disease  [facility]    Subjective:     Principal Problem:Acute necrotizing pancreatitis    HPI: 22 yo male with HIV, HCV (in SVR), polycythemia vera, who presented with a 4 day history of vomiting and hematemesis on 9/17. For his HIV he has been well controlled on biktarvy: he was undetectable on March 28. 2023. He  "has been having GI symptoms for the past few months, including nausea, vomiting, hematemesis, bloody diarrhea, and melena. He reported this to [facility] on 9/12, but came here before his evaluation was complete. CT showed "Necrotizing pancreatitis.  No acute necrotic collections.  Occlusion of the splenic vein." His lipase was normal on admission, but cydney to 332 today.    He had a EGD on 9/18 and was found to have a Corinne-Chauhan tear. A CMV IgG was positive. Biopsies are pending. Stool studies are pending - he has not had a stool since 9/16 - no colonoscopy has been performed. He deferred anoscopy on 9/12.    Interval History: Complains of back pain, shortness of breath. Still has abdominal pain. No vomiting.     Review of Systems   Constitutional:  Negative for fever.   Respiratory:  Positive for shortness of breath. Negative for cough.    Gastrointestinal:  Negative for diarrhea, nausea and vomiting.   Musculoskeletal:  Positive for back pain.   All other systems reviewed and are negative.    Objective:     Vital Signs (Most Recent):  Temp: 98.5 °F (36.9 °C) (09/22/23 0729)  Pulse: 109 (09/22/23 0729)  Resp: 16 (09/22/23 0823)  BP: 128/81 (09/22/23 0729)  SpO2: (!) 94 % (09/22/23 0729) Vital Signs (24h Range):  Temp:  [97.8 °F (36.6 °C)-98.9 °F (37.2 °C)] 98.5 °F (36.9 °C)  Pulse:  [] 109  Resp:  [14-18] 16  SpO2:  [93 %-98 %] 94 %  BP: (126-142)/(78-89) 128/81     Weight: 75.3 kg (166 lb)  Body mass index is 23.82 kg/m².    Estimated Creatinine Clearance: 196 mL/min (based on SCr of 0.6 mg/dL).     Physical Exam  Vitals and nursing note reviewed.   Constitutional:       Appearance: Normal appearance.   HENT:      Head: Normocephalic and atraumatic.   Eyes:      Extraocular Movements: Extraocular movements intact.      Conjunctiva/sclera: Conjunctivae normal.      Pupils: Pupils are equal, round, and reactive to light.   Cardiovascular:      Rate and Rhythm: Normal rate and regular rhythm.   Pulmonary:      " Effort: Pulmonary effort is normal.      Breath sounds: Normal breath sounds.   Abdominal:      General: Abdomen is flat. Bowel sounds are normal.      Tenderness: There is abdominal tenderness. There is no guarding.   Musculoskeletal:      Right lower leg: No edema.      Left lower leg: No edema.   Skin:     General: Skin is warm and dry.   Neurological:      General: No focal deficit present.      Mental Status: He is alert and oriented to person, place, and time.          Significant Labs: CBC:   Recent Labs   Lab 09/21/23  0403 09/22/23  0505   WBC 14.72* 11.81   HGB 11.6* 10.4*   HCT 36.4* 32.3*   * 151     CMP:   Recent Labs   Lab 09/22/23  0505   *   K 2.6*   CL 96   CO2 27   GLU 88   BUN 4*   CREATININE 0.6   CALCIUM 7.8*   PROT 5.0*   ALBUMIN 2.0*   BILITOT 0.9   ALKPHOS 87   AST 27   ALT 13   ANIONGAP 9     Lipase:   Recent Labs   Lab 09/21/23  0403   LIPASE 332*       Significant Imaging: None

## 2023-09-22 NOTE — PLAN OF CARE
Problem: Adult Inpatient Plan of Care  Goal: Plan of Care Review  Outcome: Ongoing, Progressing  Goal: Absence of Hospital-Acquired Illness or Injury  Outcome: Ongoing, Progressing  Goal: Optimal Comfort and Wellbeing  Outcome: Ongoing, Progressing     Problem: Adjustment to Illness (Gastrointestinal Bleeding)  Goal: Optimal Coping with Acute Illness  Outcome: Ongoing, Progressing     Problem: Bleeding (Gastrointestinal Bleeding)  Goal: Hemostasis  Outcome: Ongoing, Progressing     Problem: Infection  Goal: Absence of Infection Signs and Symptoms  Outcome: Ongoing, Progressing     Problem: HIV/AIDS Infection  Goal: HIV/AIDS Symptom Control  Outcome: Ongoing, Progressing     Problem: Anemia  Goal: Anemia Symptom Improvement  Outcome: Ongoing, Progressing     Problem: Pain Acute  Goal: Acceptable Pain Control and Functional Ability  Outcome: Ongoing, Progressing     Problem: Bowel Elimination/Continence Impairment  Goal: Effective Bowel Elimination/Continence  Outcome: Ongoing, Progressing   Pt progressing toward goals. No distress noted. No falls or injuries during shift. Pt bed in lowest position. Side rails x2. Call bell and personal belongs within reach. Telemetry maintained. Safety precautions maintained.

## 2023-09-22 NOTE — ASSESSMENT & PLAN NOTE
- mIVF  - Replete electrolytes  - Continue to monitor abdominal pain. If continued and/or worsened, please consider repeat imaging to evaluate for worsening pancreatitis.  - Antiemetics and pain control  - Hem Onc recs on anticoagulation. Recommended outpatient repeat US   - Advance diet as tolerated

## 2023-09-22 NOTE — PROGRESS NOTES
"[facility] - Observation [address] [place] [address] Medicine  Progress Note    Patient Name: Tasia Cardona  MRN: [id number]  Patient Class: IP- Inpatient   Admission Date: 9/17/2023  Length of Stay: 2 days  Attending Physician: Jimbo Flor MD  Primary Care Provider: Brittany, Primary Doctor        Subjective:     Principal Problem:Acute necrotizing pancreatitis        HPI:  Tasia Cardona is a 23 y.o. male with a PMHx of HIV, Asthma, and polycythemia vera presenting with a c/o facial pain and hematemesis. The patient states that the vomiting began 4 days ago and was initially mild but quickly progressed to hematemesis. States that he sees bright red blood sometimes clots each time he vomits. Today he is had 5-6 episodes prior to presentation in the emergency department. He feels weak, admits abdominal discomfort and significant nausea. He attempted to take ibuprofen to help with his symptoms however this did not have any benefit he reported. Patient has not been able to tolerate any oral intake for the past 2 days given the amount of nausea. Additionally patient admits significant jaw pain primarily on the left near the TMJ junction. This has began progressively with the episodes of vomiting. He also notes he has been having watery diarrhea with 1-2 episodes of dark "clot-like" bowel movements for several months. He denies any LOC, syncope, or fatigue. Pt was recently seen by colorectal surgery (9/12/23) for similar complaints and was prescribed a PPI and antiemetic medication, as well as given a GI referral for EGD/colonoscopy.    In ED VSS, afebrile. CBC with mild anemia. PT/INR WNL. Bicarb 20. Calcium 8.5. AST 63. Lipase 12. Hepatitis C Ab reactive, hepatitis C quantitative PCR in process. CXR-Cardiomediastinal silhouette is not enlarged. No focal consolidation. No sizable pleural effusion. No pneumothorax. Given 80mg protonix, 4mg morhpine, & 4mg zofran IVP.       Overview/Hospital Course:  Tasia Cardona was placed in  " observation for workup of GI bleeding. Hgb 11.1, below baseline. GI consulted and performed EGD revealing Corinne-Chauhan tear, Erythematous mucosa in the antrum (bx taken), and Congested duodenal mucosa (bx taken). CLD. Antiemetics scheduled to prevent retching. Continue protonix BID (IV while unable to tolerate PO). Pt with new severe epigastric pain on the day following the EGD. IV pain control, making adjustments as needed. Lipase now elevated to 263 (12 on admit). Begin tx for pancreatitis with aggressive IVF. 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 Heme consult for assistance with CMV serology reactive, discussing tx recs with ID. Repeat CD4 pending.  Collect stool studies if able.  DC when medically stable with plan for outpatient colonoscopy and GI f/u. Path results pending. Heme/onc recommended against AC of splenic thrombosis.    Pt noted to become tachycardic with increased WBCs on 9/21. ID consulted for assistance.      Interval History: Pt seen and examined this morning on eryn ARTEAGA. Abdominal pain worse last night, though has calmed down somewhat this morning. Pt is very eager to attempt advancing diet, and has requested trying a regular diet to see how he can tolerate it. Otherwise, doing well and with no further complaints at this time.      Objective:     Vital Signs (Most Recent):  Temp: 98.8 °F (37.1 °C) (09/22/23 1145)  Pulse: 110 (09/22/23 1146)  Resp: 18 (09/22/23 1244)  BP: (!) 143/75 (09/22/23 1145)  SpO2: 96 % (09/22/23 1145) Vital Signs (24h Range):  Temp:  [97.8 °F (36.6 °C)-98.9 °F (37.2 °C)] 98.8 °F (37.1 °C)  Pulse:  [] 110  Resp:  [14-18] 18  SpO2:  [93 %-98 %] 96 %  BP: (128-143)/(75-89) 143/75     Weight: 75.3 kg (166 lb)  Body mass index is 23.82 kg/m².  No intake or output data in the 24 hours ending 09/22/23 1257        Physical Exam  Gen: in NAD, appears stated age  Neuro: AAOx4, CN2-12 grossly intact  "BL; motor, sensory, and strength grossly intact BL  HEENT: NTNC, EOMI, PERRLA, MMM; no thyromegaly or lymphadenopathy; no JVD appreciated  CVS: RRR, no m/r/g; S1/S2 auscultated with no S3 or S4; capillary refill < 2 sec  Resp: lungs CTAB, no w/r/r; no belabored breathing or accessory muscle use appreciated   Abd: BS+ in all 4 quadrants; diffusely TTP, guarding present; soft to palpation; no organomegaly appreciated   Extrem: pulses full, equal, and regular over all 4 extremities; no UE or LE edema BL      Significant Labs: All pertinent labs within the past 24 hours have been reviewed.    Significant Imaging: I have reviewed all pertinent imaging results/findings within the past 24 hours.      Assessment/Plan:      * Acute necrotizing pancreatitis  - Interval history and physical exam findings as described above  - Imaging reviewed  - IVF provided  - Increasing diet as tolerated  - GI following  - PRN analgesics provided    Epigastric pain  - As above      Splenic vein thrombosis  - Noted on imaging  - Heme/onc consulted: no AC at this time    Corinne-Chauhan tear  - S/p EGD with GI  - Continue PPI    HIV infection  - Continue HAART  - Repeat CD4 pending  - CMV serology positive   - Hep C Ab pos, PCR neg  - Stool studies if able to collect   - ID following    Mild intermittent asthma without complication  - Currently asymptomatic, not in acute exacerbation  - PRN duonebs provided    Polycythemia  - Chronic issue, stable  - Follows with hematology outpatient    Hepatitis C antibody positive in blood  - PCR not detected.  - CT abdomen with "hepatomegaly with geographic areas of hepatic steatosis and areas of relative fatty sparing."  - Will need to see hepatology outpatient    Normocytic anemia  - H/H stable near baseline  - Will continue to monitor on daily labs      VTE Risk Mitigation (From admission, onward)         Ordered     Place sequential compression device  Until discontinued         09/17/23 2307     IP VTE " LOW RISK PATIENT  Once         09/17/23 2307                Discharge Planning   CASTILLO: 9/25/2023     Code Status: Full Code   Is the patient medically ready for discharge?: No    Reason for patient still in hospital (select all that apply): Patient trending condition  Discharge Plan A: Home   Discharge Delays: None known at this time        Jimbo Flor MD  Attending Physician  Medical Director - [facility]  Department of Hospital Medicine  9/22/2023

## 2023-09-23 LAB
ALBUMIN SERPL BCP-MCNC: 2.2 G/DL (ref 3.5–5.2)
ALP SERPL-CCNC: 128 U/L (ref 55–135)
ALT SERPL W/O P-5'-P-CCNC: 12 U/L (ref 10–44)
ANION GAP SERPL CALC-SCNC: 8 MMOL/L (ref 8–16)
AST SERPL-CCNC: 28 U/L (ref 10–40)
BASOPHILS # BLD AUTO: 0.02 K/UL (ref 0–0.2)
BASOPHILS NFR BLD: 0.2 % (ref 0–1.9)
BILIRUB SERPL-MCNC: 0.6 MG/DL (ref 0.1–1)
BUN SERPL-MCNC: <3 MG/DL (ref 6–20)
CALCIUM SERPL-MCNC: 8 MG/DL (ref 8.7–10.5)
CHLORIDE SERPL-SCNC: 97 MMOL/L (ref 95–110)
CO2 SERPL-SCNC: 28 MMOL/L (ref 23–29)
CREAT SERPL-MCNC: 0.6 MG/DL (ref 0.5–1.4)
DIFFERENTIAL METHOD: ABNORMAL
EOSINOPHIL # BLD AUTO: 0.1 K/UL (ref 0–0.5)
EOSINOPHIL NFR BLD: 1 % (ref 0–8)
ERYTHROCYTE [DISTWIDTH] IN BLOOD BY AUTOMATED COUNT: 19.7 % (ref 11.5–14.5)
EST. GFR  (NO RACE VARIABLE): >60 ML/MIN/1.73 M^2
GLUCOSE SERPL-MCNC: 93 MG/DL (ref 70–110)
HCT VFR BLD AUTO: 32 % (ref 40–54)
HGB BLD-MCNC: 10.6 G/DL (ref 14–18)
IMM GRANULOCYTES # BLD AUTO: 0.04 K/UL (ref 0–0.04)
IMM GRANULOCYTES NFR BLD AUTO: 0.4 % (ref 0–0.5)
LACTATE SERPL-SCNC: 0.9 MMOL/L (ref 0.5–2.2)
LYMPHOCYTES # BLD AUTO: 1.1 K/UL (ref 1–4.8)
LYMPHOCYTES NFR BLD: 11.2 % (ref 18–48)
MAGNESIUM SERPL-MCNC: 2.2 MG/DL (ref 1.6–2.6)
MCH RBC QN AUTO: 29.4 PG (ref 27–31)
MCHC RBC AUTO-ENTMCNC: 33.1 G/DL (ref 32–36)
MCV RBC AUTO: 89 FL (ref 82–98)
MONOCYTES # BLD AUTO: 1.5 K/UL (ref 0.3–1)
MONOCYTES NFR BLD: 14.8 % (ref 4–15)
NEUTROPHILS # BLD AUTO: 7.2 K/UL (ref 1.8–7.7)
NEUTROPHILS NFR BLD: 72.4 % (ref 38–73)
NRBC BLD-RTO: 0 /100 WBC
PHOSPHATE SERPL-MCNC: 1.9 MG/DL (ref 2.7–4.5)
PLATELET # BLD AUTO: 194 K/UL (ref 150–450)
PMV BLD AUTO: 10 FL (ref 9.2–12.9)
POTASSIUM SERPL-SCNC: 2.8 MMOL/L (ref 3.5–5.1)
PROCALCITONIN SERPL IA-MCNC: 2.36 NG/ML
PROT SERPL-MCNC: 5.5 G/DL (ref 6–8.4)
RBC # BLD AUTO: 3.61 M/UL (ref 4.6–6.2)
RV AG STL QL IA.RAPID: NEGATIVE
SODIUM SERPL-SCNC: 133 MMOL/L (ref 136–145)
TRIGL SERPL-MCNC: 60 MG/DL (ref 30–150)
WBC # BLD AUTO: 9.9 K/UL (ref 3.9–12.7)

## 2023-09-23 PROCEDURE — 63600175 PHARM REV CODE 636 W HCPCS: Performed by: STUDENT IN AN ORGANIZED HEALTH CARE EDUCATION/TRAINING PROGRAM

## 2023-09-23 PROCEDURE — 25000003 PHARM REV CODE 250: Performed by: NURSE PRACTITIONER

## 2023-09-23 PROCEDURE — 83735 ASSAY OF MAGNESIUM: CPT | Performed by: STUDENT IN AN ORGANIZED HEALTH CARE EDUCATION/TRAINING PROGRAM

## 2023-09-23 PROCEDURE — C9113 INJ PANTOPRAZOLE SODIUM, VIA: HCPCS

## 2023-09-23 PROCEDURE — 87040 BLOOD CULTURE FOR BACTERIA: CPT | Performed by: STUDENT IN AN ORGANIZED HEALTH CARE EDUCATION/TRAINING PROGRAM

## 2023-09-23 PROCEDURE — 99233 PR SUBSEQUENT HOSPITAL CARE,LEVL III: ICD-10-PCS | Mod: ,,, | Performed by: INTERNAL MEDICINE

## 2023-09-23 PROCEDURE — 36415 COLL VENOUS BLD VENIPUNCTURE: CPT | Performed by: STUDENT IN AN ORGANIZED HEALTH CARE EDUCATION/TRAINING PROGRAM

## 2023-09-23 PROCEDURE — 84145 PROCALCITONIN (PCT): CPT | Performed by: STUDENT IN AN ORGANIZED HEALTH CARE EDUCATION/TRAINING PROGRAM

## 2023-09-23 PROCEDURE — 85025 COMPLETE CBC W/AUTO DIFF WBC: CPT | Performed by: STUDENT IN AN ORGANIZED HEALTH CARE EDUCATION/TRAINING PROGRAM

## 2023-09-23 PROCEDURE — 99233 SBSQ HOSP IP/OBS HIGH 50: CPT | Mod: ,,, | Performed by: STUDENT IN AN ORGANIZED HEALTH CARE EDUCATION/TRAINING PROGRAM

## 2023-09-23 PROCEDURE — 83605 ASSAY OF LACTIC ACID: CPT | Performed by: STUDENT IN AN ORGANIZED HEALTH CARE EDUCATION/TRAINING PROGRAM

## 2023-09-23 PROCEDURE — 25000003 PHARM REV CODE 250

## 2023-09-23 PROCEDURE — 21400001 HC TELEMETRY ROOM

## 2023-09-23 PROCEDURE — 99233 PR SUBSEQUENT HOSPITAL CARE,LEVL III: ICD-10-PCS | Mod: ,,, | Performed by: STUDENT IN AN ORGANIZED HEALTH CARE EDUCATION/TRAINING PROGRAM

## 2023-09-23 PROCEDURE — 84100 ASSAY OF PHOSPHORUS: CPT | Performed by: STUDENT IN AN ORGANIZED HEALTH CARE EDUCATION/TRAINING PROGRAM

## 2023-09-23 PROCEDURE — 84478 ASSAY OF TRIGLYCERIDES: CPT | Performed by: STUDENT IN AN ORGANIZED HEALTH CARE EDUCATION/TRAINING PROGRAM

## 2023-09-23 PROCEDURE — 25000003 PHARM REV CODE 250: Performed by: STUDENT IN AN ORGANIZED HEALTH CARE EDUCATION/TRAINING PROGRAM

## 2023-09-23 PROCEDURE — 63600175 PHARM REV CODE 636 W HCPCS

## 2023-09-23 PROCEDURE — 80053 COMPREHEN METABOLIC PANEL: CPT | Performed by: STUDENT IN AN ORGANIZED HEALTH CARE EDUCATION/TRAINING PROGRAM

## 2023-09-23 PROCEDURE — 99233 SBSQ HOSP IP/OBS HIGH 50: CPT | Mod: ,,, | Performed by: INTERNAL MEDICINE

## 2023-09-23 RX ORDER — HYDROMORPHONE HYDROCHLORIDE 1 MG/ML
1 INJECTION, SOLUTION INTRAMUSCULAR; INTRAVENOUS; SUBCUTANEOUS EVERY 4 HOURS PRN
Status: DISCONTINUED | OUTPATIENT
Start: 2023-09-23 | End: 2023-09-25 | Stop reason: HOSPADM

## 2023-09-23 RX ORDER — POTASSIUM CHLORIDE 7.45 MG/ML
40 INJECTION INTRAVENOUS ONCE
Status: COMPLETED | OUTPATIENT
Start: 2023-09-23 | End: 2023-09-23

## 2023-09-23 RX ADMIN — PROCHLORPERAZINE EDISYLATE 5 MG: 5 INJECTION INTRAMUSCULAR; INTRAVENOUS at 02:09

## 2023-09-23 RX ADMIN — METRONIDAZOLE 500 MG: 5 INJECTION, SOLUTION INTRAVENOUS at 12:09

## 2023-09-23 RX ADMIN — CEFEPIME 1 G: 1 INJECTION, POWDER, FOR SOLUTION INTRAMUSCULAR; INTRAVENOUS at 05:09

## 2023-09-23 RX ADMIN — HYDROMORPHONE HYDROCHLORIDE 1 MG: 1 INJECTION, SOLUTION INTRAMUSCULAR; INTRAVENOUS; SUBCUTANEOUS at 05:09

## 2023-09-23 RX ADMIN — SODIUM PHOSPHATE, MONOBASIC, MONOHYDRATE AND SODIUM PHOSPHATE, DIBASIC, ANHYDROUS 30 MMOL: 142; 276 INJECTION, SOLUTION INTRAVENOUS at 06:09

## 2023-09-23 RX ADMIN — PROCHLORPERAZINE EDISYLATE 5 MG: 5 INJECTION INTRAMUSCULAR; INTRAVENOUS at 09:09

## 2023-09-23 RX ADMIN — VANCOMYCIN HYDROCHLORIDE 1250 MG: 1.25 INJECTION, POWDER, LYOPHILIZED, FOR SOLUTION INTRAVENOUS at 10:09

## 2023-09-23 RX ADMIN — HYDROMORPHONE HYDROCHLORIDE 1 MG: 1 INJECTION, SOLUTION INTRAMUSCULAR; INTRAVENOUS; SUBCUTANEOUS at 01:09

## 2023-09-23 RX ADMIN — CEFEPIME 1 G: 1 INJECTION, POWDER, FOR SOLUTION INTRAMUSCULAR; INTRAVENOUS at 08:09

## 2023-09-23 RX ADMIN — PROCHLORPERAZINE EDISYLATE 5 MG: 5 INJECTION INTRAMUSCULAR; INTRAVENOUS at 05:09

## 2023-09-23 RX ADMIN — HYDROMORPHONE HYDROCHLORIDE 1 MG: 1 INJECTION, SOLUTION INTRAMUSCULAR; INTRAVENOUS; SUBCUTANEOUS at 09:09

## 2023-09-23 RX ADMIN — HYDROMORPHONE HYDROCHLORIDE 0.5 MG: 1 INJECTION, SOLUTION INTRAMUSCULAR; INTRAVENOUS; SUBCUTANEOUS at 02:09

## 2023-09-23 RX ADMIN — PROCHLORPERAZINE EDISYLATE 5 MG: 5 INJECTION INTRAMUSCULAR; INTRAVENOUS at 11:09

## 2023-09-23 RX ADMIN — HYDROMORPHONE HYDROCHLORIDE 0.5 MG: 1 INJECTION, SOLUTION INTRAMUSCULAR; INTRAVENOUS; SUBCUTANEOUS at 05:09

## 2023-09-23 RX ADMIN — FOLIC ACID 1 MG: 1 TABLET ORAL at 09:09

## 2023-09-23 RX ADMIN — BICTEGRAVIR SODIUM, EMTRICITABINE, AND TENOFOVIR ALAFENAMIDE FUMARATE 1 TABLET: 50; 200; 25 TABLET ORAL at 09:09

## 2023-09-23 RX ADMIN — PANTOPRAZOLE SODIUM 40 MG: 40 INJECTION, POWDER, FOR SOLUTION INTRAVENOUS at 08:09

## 2023-09-23 RX ADMIN — PANTOPRAZOLE SODIUM 40 MG: 40 INJECTION, POWDER, FOR SOLUTION INTRAVENOUS at 09:09

## 2023-09-23 RX ADMIN — VANCOMYCIN HYDROCHLORIDE 1500 MG: 1.5 INJECTION, POWDER, LYOPHILIZED, FOR SOLUTION INTRAVENOUS at 06:09

## 2023-09-23 RX ADMIN — METRONIDAZOLE 500 MG: 5 INJECTION, SOLUTION INTRAVENOUS at 02:09

## 2023-09-23 RX ADMIN — SODIUM CHLORIDE, POTASSIUM CHLORIDE, SODIUM LACTATE AND CALCIUM CHLORIDE: 600; 310; 30; 20 INJECTION, SOLUTION INTRAVENOUS at 10:09

## 2023-09-23 RX ADMIN — CEFEPIME 1 G: 1 INJECTION, POWDER, FOR SOLUTION INTRAMUSCULAR; INTRAVENOUS at 01:09

## 2023-09-23 RX ADMIN — POTASSIUM CHLORIDE 40 MEQ: 7.46 INJECTION, SOLUTION INTRAVENOUS at 01:09

## 2023-09-23 NOTE — SUBJECTIVE & OBJECTIVE
Interval History: Still complains of back pain and shortness of breath. Febrile to 101 last night.    Review of Systems   Constitutional:  Negative for fever.   Respiratory:  Positive for shortness of breath. Negative for cough.    Gastrointestinal:  Negative for diarrhea, nausea and vomiting.   Musculoskeletal:  Positive for back pain.   All other systems reviewed and are negative.    Objective:     Vital Signs (Most Recent):  Temp: 98.4 °F (36.9 °C) (09/23/23 1136)  Pulse: 92 (09/23/23 1136)  Resp: 18 (09/23/23 1136)  BP: 120/80 (09/23/23 1136)  SpO2: 95 % (09/23/23 1136) Vital Signs (24h Range):  Temp:  [97.8 °F (36.6 °C)-101 °F (38.3 °C)] 98.4 °F (36.9 °C)  Pulse:  [] 92  Resp:  [17-22] 18  SpO2:  [93 %-95 %] 95 %  BP: (120-142)/(76-89) 120/80     Weight: 75.3 kg (166 lb)  Body mass index is 23.82 kg/m².    Estimated Creatinine Clearance: 196 mL/min (based on SCr of 0.6 mg/dL).     Physical Exam  Vitals and nursing note reviewed.   Constitutional:       Appearance: Normal appearance.   HENT:      Head: Normocephalic and atraumatic.   Eyes:      Extraocular Movements: Extraocular movements intact.      Conjunctiva/sclera: Conjunctivae normal.      Pupils: Pupils are equal, round, and reactive to light.   Cardiovascular:      Rate and Rhythm: Normal rate and regular rhythm.   Pulmonary:      Effort: Pulmonary effort is normal.      Breath sounds: Normal breath sounds.   Abdominal:      General: Abdomen is flat. Bowel sounds are normal.      Tenderness: There is abdominal tenderness. There is no guarding.   Musculoskeletal:      Right lower leg: No edema.      Left lower leg: No edema.   Skin:     General: Skin is warm and dry.   Neurological:      General: No focal deficit present.      Mental Status: He is alert and oriented to person, place, and time.          Significant Labs: Blood Culture:   Recent Labs   Lab 09/23/23  0448 09/23/23  0613   LABBLOO No Growth to date No Growth to date     C4 Count: No  "results for input(s): "C4" in the last 48 hours.  CBC:   Recent Labs   Lab 09/22/23  0505 09/23/23  0449   WBC 11.81 9.90   HGB 10.4* 10.6*   HCT 32.3* 32.0*    194     CMP:   Recent Labs   Lab 09/22/23  0505 09/23/23  0449   * 133*   K 2.6* 2.8*   CL 96 97   CO2 27 28   GLU 88 93   BUN 4* <3*   CREATININE 0.6 0.6   CALCIUM 7.8* 8.0*   PROT 5.0* 5.5*   ALBUMIN 2.0* 2.2*   BILITOT 0.9 0.6   ALKPHOS 87 128   AST 27 28   ALT 13 12   ANIONGAP 9 8       Significant Imaging: CT: I have reviewed all pertinent results/findings within the past 24 hours:  No PE.  Significant inflammatory process with inflammation surrounding the partially visualized pancreas.  The spleen is enlarged.  Heterogeneous attenuation of the liver suggestive transient hepatic attenuation difference but also hepatic steatosis.  "

## 2023-09-23 NOTE — PLAN OF CARE
Problem: Adult Inpatient Plan of Care  Goal: Plan of Care Review  Outcome: Ongoing, Progressing  Goal: Absence of Hospital-Acquired Illness or Injury  Outcome: Ongoing, Progressing  Goal: Optimal Comfort and Wellbeing  Outcome: Ongoing, Progressing  Goal: Readiness for Transition of Care  Outcome: Ongoing, Progressing     Problem: Adjustment to Illness (Gastrointestinal Bleeding)  Goal: Optimal Coping with Acute Illness  Outcome: Ongoing, Progressing     Problem: Bleeding (Gastrointestinal Bleeding)  Goal: Hemostasis  Outcome: Ongoing, Progressing     Problem: Infection  Goal: Absence of Infection Signs and Symptoms  Outcome: Ongoing, Progressing     Problem: HIV/AIDS Infection  Goal: HIV/AIDS Symptom Control  Outcome: Ongoing, Progressing     Problem: Anemia  Goal: Anemia Symptom Improvement  Outcome: Ongoing, Progressing     Problem: Pain Acute  Goal: Acceptable Pain Control and Functional Ability  Outcome: Ongoing, Progressing     Problem: Bowel Elimination/Continence Impairment  Goal: Effective Bowel Elimination/Continence  Outcome: Ongoing, Progressing

## 2023-09-23 NOTE — PROGRESS NOTES
"[facility] - Observation 11[facility]  Infectious Disease  Progress Note    Patient Name: Tasia Cardona  MRN: [id number]  Admission Date: 9/17/2023  Length of Stay: 3 days  Attending Physician: Jimbo Flor MD  Primary Care Provider: No, Primary Doctor    Isolation Status: No active isolations  Assessment/Plan:      ID  HIV infection  24 yo male with HIV, HCV (in SVR), and PCV who presents with GI symptoms for over 4 months. HIV is well controlled on Biktarvy. CD4 may be altered from inflammatory events. Currently 296.    Recommend:  No change in HAART - continue Biktarvy for now. No OI prophylaxis at this time.    GI  * Acute necrotizing pancreatitis  Pancreatitis may be related to splenic vein thrombosis - as may the other symptoms. Defer to hematology, as this may be related to PCV. No evidence that this is related to HIV or Biktarvy at this time.     Hepatitis C antibody positive in blood  HCV PCR was negative in December 2021. No additional therapy needed at this time.    If patient becomes NPO, OK to hold Biktarvy until PO intake resumes.    Thank you for your consult. I will sign off. Please contact us if you have any additional questions.    Jeremias Herrera MD  Infectious Disease  [facility] - Observation 11[facility]    Subjective:     Principal Problem:Acute necrotizing pancreatitis    HPI: 24 yo male with HIV, HCV (in SVR), polycythemia vera, who presented with a 4 day history of vomiting and hematemesis on 9/17. For his HIV he has been well controlled on biktarvy: he was undetectable on March 28. 2023. He has been having GI symptoms for the past few months, including nausea, vomiting, hematemesis, bloody diarrhea, and melena. He reported this to [facility] on 9/12, but came here before his evaluation was complete. CT showed "Necrotizing pancreatitis.  No acute necrotic collections.  Occlusion of the splenic vein." His lipase was normal on admission, but cydney to 332 today.    He had a EGD on 9/18 and was found to have a " Corinne-Chauhan tear. A CMV IgG was positive. Biopsies are pending. Stool studies are pending - he has not had a stool since 9/16 - no colonoscopy has been performed. He deferred anoscopy on 9/12.    Interval History: Still complains of back pain and shortness of breath. Febrile to 101 last night.    Review of Systems   Constitutional:  Negative for fever.   Respiratory:  Positive for shortness of breath. Negative for cough.    Gastrointestinal:  Negative for diarrhea, nausea and vomiting.   Musculoskeletal:  Positive for back pain.   All other systems reviewed and are negative.    Objective:     Vital Signs (Most Recent):  Temp: 98.4 °F (36.9 °C) (09/23/23 1136)  Pulse: 92 (09/23/23 1136)  Resp: 18 (09/23/23 1136)  BP: 120/80 (09/23/23 1136)  SpO2: 95 % (09/23/23 1136) Vital Signs (24h Range):  Temp:  [97.8 °F (36.6 °C)-101 °F (38.3 °C)] 98.4 °F (36.9 °C)  Pulse:  [] 92  Resp:  [17-22] 18  SpO2:  [93 %-95 %] 95 %  BP: (120-142)/(76-89) 120/80     Weight: 75.3 kg (166 lb)  Body mass index is 23.82 kg/m².    Estimated Creatinine Clearance: 196 mL/min (based on SCr of 0.6 mg/dL).     Physical Exam  Vitals and nursing note reviewed.   Constitutional:       Appearance: Normal appearance.   HENT:      Head: Normocephalic and atraumatic.   Eyes:      Extraocular Movements: Extraocular movements intact.      Conjunctiva/sclera: Conjunctivae normal.      Pupils: Pupils are equal, round, and reactive to light.   Cardiovascular:      Rate and Rhythm: Normal rate and regular rhythm.   Pulmonary:      Effort: Pulmonary effort is normal.      Breath sounds: Normal breath sounds.   Abdominal:      General: Abdomen is flat. Bowel sounds are normal.      Tenderness: There is abdominal tenderness. There is no guarding.   Musculoskeletal:      Right lower leg: No edema.      Left lower leg: No edema.   Skin:     General: Skin is warm and dry.   Neurological:      General: No focal deficit present.      Mental Status: He is alert  "and oriented to person, place, and time.          Significant Labs: Blood Culture:   Recent Labs   Lab 09/23/23  0448 09/23/23  0613   LABBLOO No Growth to date No Growth to date     C4 Count: No results for input(s): "C4" in the last 48 hours.  CBC:   Recent Labs   Lab 09/22/23  0505 09/23/23  0449   WBC 11.81 9.90   HGB 10.4* 10.6*   HCT 32.3* 32.0*    194     CMP:   Recent Labs   Lab 09/22/23  0505 09/23/23  0449   * 133*   K 2.6* 2.8*   CL 96 97   CO2 27 28   GLU 88 93   BUN 4* <3*   CREATININE 0.6 0.6   CALCIUM 7.8* 8.0*   PROT 5.0* 5.5*   ALBUMIN 2.0* 2.2*   BILITOT 0.9 0.6   ALKPHOS 87 128   AST 27 28   ALT 13 12   ANIONGAP 9 8       Significant Imaging: CT: I have reviewed all pertinent results/findings within the past 24 hours:  No PE.  Significant inflammatory process with inflammation surrounding the partially visualized pancreas.  The spleen is enlarged.  Heterogeneous attenuation of the liver suggestive transient hepatic attenuation difference but also hepatic steatosis.    "

## 2023-09-23 NOTE — ASSESSMENT & PLAN NOTE
24 yo male with HIV, HCV (in SVR), and PCV who presents with GI symptoms for over 4 months. HIV is well controlled on Biktarvy. CD4 may be altered from inflammatory events. Currently 296.    Recommend:  No change in HAART - continue Biktarvy for now. No OI prophylaxis at this time.

## 2023-09-23 NOTE — ASSESSMENT & PLAN NOTE
- Interval history and physical exam findings as described above  - Imaging reviewed  - IVF provided  - Increasing diet as tolerated  - GI following  - Tmax 101  - On empiric vanc and cefepime, ID following  - PRN analgesics provided

## 2023-09-23 NOTE — SUBJECTIVE & OBJECTIVE
Interval History: Pt seen and examined this morning on rounds. HCANDLER. Minimally able to tolerate regular diet yesterday, though he wants to keep trying. Discussed broadened abx given fevers last night, awaiting further ID recs. Increased dilaudid for abd pain. Otherwise, doing well and with no further complaints at this time.      Objective:     Vital Signs (Most Recent):  Temp: 98.4 °F (36.9 °C) (09/23/23 1136)  Pulse: 92 (09/23/23 1136)  Resp: 18 (09/23/23 1136)  BP: 120/80 (09/23/23 1136)  SpO2: 95 % (09/23/23 1136) Vital Signs (24h Range):  Temp:  [97.8 °F (36.6 °C)-101 °F (38.3 °C)] 98.4 °F (36.9 °C)  Pulse:  [] 92  Resp:  [17-22] 18  SpO2:  [93 %-95 %] 95 %  BP: (120-142)/(76-89) 120/80     Weight: 75.3 kg (166 lb)  Body mass index is 23.82 kg/m².    Intake/Output Summary (Last 24 hours) at 9/23/2023 1242  Last data filed at 9/23/2023 0532  Gross per 24 hour   Intake --   Output 1400 ml   Net -1400 ml           Physical Exam  Gen: in NAD, appears stated age  Neuro: AAOx4, CN2-12 grossly intact BL; motor, sensory, and strength grossly intact BL  HEENT: NTNC, EOMI, PERRLA, MMM; no thyromegaly or lymphadenopathy; no JVD appreciated  CVS: RRR, no m/r/g; S1/S2 auscultated with no S3 or S4; capillary refill < 2 sec  Resp: lungs CTAB, no w/r/r; no belabored breathing or accessory muscle use appreciated   Abd: BS+ in all 4 quadrants; diffusely TTP, guarding present; soft to palpation; no organomegaly appreciated   Extrem: pulses full, equal, and regular over all 4 extremities; no UE or LE edema BL      Significant Labs: All pertinent labs within the past 24 hours have been reviewed.    Significant Imaging: I have reviewed all pertinent imaging results/findings within the past 24 hours.

## 2023-09-23 NOTE — PROGRESS NOTES
"[facility] [address] [place] [address] Medicine  Progress Note    Patient Name: Tasia Cardona  MRN: [id number]  Patient Class: IP- Inpatient   Admission Date: 9/17/2023  Length of Stay: 3 days  Attending Physician: Jimbo Flor MD  Primary Care Provider: Brittany, Primary Doctor        Subjective:     Principal Problem:Acute necrotizing pancreatitis        HPI:  Tasia Cardona is a 23 y.o. male with a PMHx of HIV, Asthma, and polycythemia vera presenting with a c/o facial pain and hematemesis. The patient states that the vomiting began 4 days ago and was initially mild but quickly progressed to hematemesis. States that he sees bright red blood sometimes clots each time he vomits. Today he is had 5-6 episodes prior to presentation in the emergency department. He feels weak, admits abdominal discomfort and significant nausea. He attempted to take ibuprofen to help with his symptoms however this did not have any benefit he reported. Patient has not been able to tolerate any oral intake for the past 2 days given the amount of nausea. Additionally patient admits significant jaw pain primarily on the left near the TMJ junction. This has began progressively with the episodes of vomiting. He also notes he has been having watery diarrhea with 1-2 episodes of dark "clot-like" bowel movements for several months. He denies any LOC, syncope, or fatigue. Pt was recently seen by colorectal surgery (9/12/23) for similar complaints and was prescribed a PPI and antiemetic medication, as well as given a GI referral for EGD/colonoscopy.    In ED VSS, afebrile. CBC with mild anemia. PT/INR WNL. Bicarb 20. Calcium 8.5. AST 63. Lipase 12. Hepatitis C Ab reactive, hepatitis C quantitative PCR in process. CXR-Cardiomediastinal silhouette is not enlarged. No focal consolidation. No sizable pleural effusion. No pneumothorax. Given 80mg protonix, 4mg morhpine, & 4mg zofran IVP.       Overview/Hospital Course:  Tasia Cardona was placed in  " observation for workup of GI bleeding. Hgb 11.1, below baseline. GI consulted and performed EGD revealing Corinne-Chauhan tear, Erythematous mucosa in the antrum (bx taken), and Congested duodenal mucosa (bx taken). CLD. Antiemetics scheduled to prevent retching. Continue protonix BID (IV while unable to tolerate PO). Pt with new severe epigastric pain on the day following the EGD. IV pain control, making adjustments as needed. Lipase now elevated to 263 (12 on admit). Begin tx for pancreatitis with aggressive IVF. 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 Heme consult for assistance with CMV serology reactive, discussing tx recs with ID. Repeat CD4 pending.  Collect stool studies if able.  DC when medically stable with plan for outpatient colonoscopy and GI f/u. Path results pending. Heme/onc recommended against AC of splenic thrombosis.    Pt noted to become tachycardic with increased WBCs on 9/21. ID consulted for assistance. Tmax 101 into 9/23, abx broadened to vanc and cefepime.      Interval History: Pt seen and examined this morning on devon. CHANDLER. Minimally able to tolerate regular diet yesterday, though he wants to keep trying. Discussed broadened abx given fevers last night, awaiting further ID recs. Increased dilaudid for abd pain. Otherwise, doing well and with no further complaints at this time.      Objective:     Vital Signs (Most Recent):  Temp: 98.4 °F (36.9 °C) (09/23/23 1136)  Pulse: 92 (09/23/23 1136)  Resp: 18 (09/23/23 1136)  BP: 120/80 (09/23/23 1136)  SpO2: 95 % (09/23/23 1136) Vital Signs (24h Range):  Temp:  [97.8 °F (36.6 °C)-101 °F (38.3 °C)] 98.4 °F (36.9 °C)  Pulse:  [] 92  Resp:  [17-22] 18  SpO2:  [93 %-95 %] 95 %  BP: (120-142)/(76-89) 120/80     Weight: 75.3 kg (166 lb)  Body mass index is 23.82 kg/m².    Intake/Output Summary (Last 24 hours) at 9/23/2023 1242  Last data filed at 9/23/2023 0532  Gross per 24  "hour   Intake --   Output 1400 ml   Net -1400 ml           Physical Exam  Gen: in NAD, appears stated age  Neuro: AAOx4, CN2-12 grossly intact BL; motor, sensory, and strength grossly intact BL  HEENT: NTNC, EOMI, PERRLA, MMM; no thyromegaly or lymphadenopathy; no JVD appreciated  CVS: RRR, no m/r/g; S1/S2 auscultated with no S3 or S4; capillary refill < 2 sec  Resp: lungs CTAB, no w/r/r; no belabored breathing or accessory muscle use appreciated   Abd: BS+ in all 4 quadrants; diffusely TTP, guarding present; soft to palpation; no organomegaly appreciated   Extrem: pulses full, equal, and regular over all 4 extremities; no UE or LE edema BL      Significant Labs: All pertinent labs within the past 24 hours have been reviewed.    Significant Imaging: I have reviewed all pertinent imaging results/findings within the past 24 hours.      Assessment/Plan:      * Acute necrotizing pancreatitis  - Interval history and physical exam findings as described above  - Imaging reviewed  - IVF provided  - Increasing diet as tolerated  - GI following  - Tmax 101  - On empiric vanc and cefepime, ID following  - PRN analgesics provided    Epigastric pain  - As above      Splenic vein thrombosis  - Noted on imaging  - Heme/onc consulted: no AC at this time    Corinne-Chauhan tear  - S/p EGD with GI  - Continue PPI    HIV infection  - Continue HAART  - Repeat CD4 pending  - CMV serology positive   - Hep C Ab pos, PCR neg  - Stool studies if able to collect   - ID following    Mild intermittent asthma without complication  - Currently asymptomatic, not in acute exacerbation  - PRN duonebs provided    Polycythemia  - Chronic issue, stable  - Follows with hematology outpatient    Hepatitis C antibody positive in blood  - PCR not detected.  - CT abdomen with "hepatomegaly with geographic areas of hepatic steatosis and areas of relative fatty sparing."  - Will need to see hepatology outpatient    Normocytic anemia  - H/H stable near " baseline  - Will continue to monitor on daily labs      VTE Risk Mitigation (From admission, onward)         Ordered     Place sequential compression device  Until discontinued         09/17/23 2307     IP VTE LOW RISK PATIENT  Once         09/17/23 2307                Discharge Planning   CASTILLO: 9/27/2023     Code Status: Full Code   Is the patient medically ready for discharge?: No    Reason for patient still in hospital (select all that apply): Patient trending condition  Discharge Plan A: Home   Discharge Delays: None known at this time        Jimbo Flor MD  Attending Physician  Medical Director - [facility] [place] [facility]  Department of Hospital Medicine  9/23/2023

## 2023-09-23 NOTE — PROGRESS NOTES
Pharmacokinetic Initial Assessment: IV Vancomycin    Assessment/Plan:    Initiate intravenous vancomycin with loading dose of 1500 mg once followed by a maintenance dose of vancomycin 1250mg IV every 12 hours  Desired empiric serum trough concentration is 10 to 20 mcg/mL  Draw vancomycin trough level 60 min prior to fourth dose on 9/24 at approximately 1600  Pharmacy will continue to follow and monitor vancomycin.      Please contact pharmacy at extension [phone] with any questions regarding this assessment.     Thank you for the consult,   Marivel Kimuyen       Patient brief summary:  Tasia Cardona is a 23 y.o. male initiated on antimicrobial therapy with IV Vancomycin for treatment of suspected intra-abdominal infection    Drug Allergies:   Review of patient's allergies indicates:   Allergen Reactions    [place] Swelling    Pcn [penicillins] Hives    Pecan nut Swelling    Soy     Sulfa (sulfonamide antibiotics) Hives       Actual Body Weight:   75.3kg    Renal Function:   Estimated Creatinine Clearance: 196 mL/min (based on SCr of 0.6 mg/dL).,     Dialysis Method (if applicable):  N/A    CBC (last 72 hours):  Recent Labs   Lab Result Units 09/20/23  0553 09/21/23  0403 09/22/23  0505   WBC K/uL 12.34 14.72* 11.81   Hemoglobin g/dL 12.8* 11.6* 10.4*   Hematocrit % 39.6* 36.4* 32.3*   Platelets K/uL 171 139* 151   Gran % % 79.0* 77.2* 73.0   Lymph % % 8.2* 8.8* 9.9*   Mono % % 12.2 13.2 15.7*   Eosinophil % % 0.0 0.1 0.3   Basophil % % 0.2 0.2 0.3   Differential Method  Automated Automated Automated       Metabolic Panel (last 72 hours):  Recent Labs   Lab Result Units 09/20/23  0553 09/22/23  0505 09/22/23  1015   Sodium mmol/L 136 132*  --    Potassium mmol/L 3.8 2.6*  --    Chloride mmol/L 100 96  --    CO2 mmol/L 26 27  --    Glucose mg/dL 119* 88  --    Glucose, UA   --   --  Negative   BUN mg/dL 6 4*  --    Creatinine mg/dL 0.7 0.6  --    Albumin g/dL 3.1* 2.0*  --    Total Bilirubin mg/dL 1.1* 0.9  --    Alkaline  "Phosphatase U/L 111 87  --    AST U/L 72* 27  --    ALT U/L 26 13  --    Magnesium mg/dL  --  1.4*  --    Phosphorus mg/dL  --  1.8*  --        Drug levels (last 3 results):  No results for input(s): "VANCOMYCINRA", "VANCORANDOM", "VANCOMYCINPE", "VANCOPEAK", "VANCOMYCINTR", "VANCOTROUGH" in the last 72 hours.    Microbiologic Results:  Microbiology Results (last 7 days)       Procedure Component Value Units Date/Time    Blood culture [[id number]]     Order Status: Sent Specimen: Blood     Blood culture [[id number]]     Order Status: Sent Specimen: Blood     Stool culture [[id number]] Collected: 09/22/23 1016    Order Status: Sent Specimen: Stool Updated: 09/22/23 1145    E. coli 0157 antigen [[id number]] Collected: 09/22/23 1016    Order Status: No result Specimen: Stool Updated: 09/22/23 1145    Clostridium difficile EIA [[id number]]     Order Status: Canceled Specimen: Stool             "

## 2023-09-24 LAB
ALBUMIN SERPL BCP-MCNC: 2 G/DL (ref 3.5–5.2)
ALP SERPL-CCNC: 146 U/L (ref 55–135)
ALT SERPL W/O P-5'-P-CCNC: 16 U/L (ref 10–44)
ANION GAP SERPL CALC-SCNC: 10 MMOL/L (ref 8–16)
AST SERPL-CCNC: 36 U/L (ref 10–40)
BASOPHILS # BLD AUTO: 0.02 K/UL (ref 0–0.2)
BASOPHILS NFR BLD: 0.2 % (ref 0–1.9)
BILIRUB SERPL-MCNC: 0.5 MG/DL (ref 0.1–1)
BUN SERPL-MCNC: <3 MG/DL (ref 6–20)
CALCIUM SERPL-MCNC: 7.9 MG/DL (ref 8.7–10.5)
CHLORIDE SERPL-SCNC: 97 MMOL/L (ref 95–110)
CO2 SERPL-SCNC: 29 MMOL/L (ref 23–29)
CREAT SERPL-MCNC: 0.5 MG/DL (ref 0.5–1.4)
CRYPTOSP AG STL QL IA: NEGATIVE
DIFFERENTIAL METHOD: ABNORMAL
EOSINOPHIL # BLD AUTO: 0.2 K/UL (ref 0–0.5)
EOSINOPHIL NFR BLD: 1.6 % (ref 0–8)
ERYTHROCYTE [DISTWIDTH] IN BLOOD BY AUTOMATED COUNT: 19.7 % (ref 11.5–14.5)
EST. GFR  (NO RACE VARIABLE): >60 ML/MIN/1.73 M^2
G LAMBLIA AG STL QL IA: NEGATIVE
GLUCOSE SERPL-MCNC: 107 MG/DL (ref 70–110)
HCT VFR BLD AUTO: 28.4 % (ref 40–54)
HGB BLD-MCNC: 9.5 G/DL (ref 14–18)
IMM GRANULOCYTES # BLD AUTO: 0.09 K/UL (ref 0–0.04)
IMM GRANULOCYTES NFR BLD AUTO: 0.8 % (ref 0–0.5)
LYMPHOCYTES # BLD AUTO: 1.2 K/UL (ref 1–4.8)
LYMPHOCYTES NFR BLD: 11 % (ref 18–48)
MAGNESIUM SERPL-MCNC: 1.9 MG/DL (ref 1.6–2.6)
MCH RBC QN AUTO: 30.3 PG (ref 27–31)
MCHC RBC AUTO-ENTMCNC: 33.5 G/DL (ref 32–36)
MCV RBC AUTO: 90 FL (ref 82–98)
MONOCYTES # BLD AUTO: 1.5 K/UL (ref 0.3–1)
MONOCYTES NFR BLD: 13.6 % (ref 4–15)
NEUTROPHILS # BLD AUTO: 7.9 K/UL (ref 1.8–7.7)
NEUTROPHILS NFR BLD: 72.8 % (ref 38–73)
NRBC BLD-RTO: 0 /100 WBC
PHOSPHATE SERPL-MCNC: 4.2 MG/DL (ref 2.7–4.5)
PLATELET # BLD AUTO: 240 K/UL (ref 150–450)
PMV BLD AUTO: 9.9 FL (ref 9.2–12.9)
POTASSIUM SERPL-SCNC: 2.7 MMOL/L (ref 3.5–5.1)
PROT SERPL-MCNC: 5.2 G/DL (ref 6–8.4)
RBC # BLD AUTO: 3.14 M/UL (ref 4.6–6.2)
SODIUM SERPL-SCNC: 136 MMOL/L (ref 136–145)
VANCOMYCIN TROUGH SERPL-MCNC: 7.6 UG/ML (ref 10–22)
WBC # BLD AUTO: 10.88 K/UL (ref 3.9–12.7)

## 2023-09-24 PROCEDURE — 83735 ASSAY OF MAGNESIUM: CPT | Performed by: STUDENT IN AN ORGANIZED HEALTH CARE EDUCATION/TRAINING PROGRAM

## 2023-09-24 PROCEDURE — 63600175 PHARM REV CODE 636 W HCPCS

## 2023-09-24 PROCEDURE — 25000003 PHARM REV CODE 250: Performed by: STUDENT IN AN ORGANIZED HEALTH CARE EDUCATION/TRAINING PROGRAM

## 2023-09-24 PROCEDURE — 80053 COMPREHEN METABOLIC PANEL: CPT | Performed by: STUDENT IN AN ORGANIZED HEALTH CARE EDUCATION/TRAINING PROGRAM

## 2023-09-24 PROCEDURE — 99233 PR SUBSEQUENT HOSPITAL CARE,LEVL III: ICD-10-PCS | Mod: ,,, | Performed by: STUDENT IN AN ORGANIZED HEALTH CARE EDUCATION/TRAINING PROGRAM

## 2023-09-24 PROCEDURE — C9113 INJ PANTOPRAZOLE SODIUM, VIA: HCPCS

## 2023-09-24 PROCEDURE — 99233 SBSQ HOSP IP/OBS HIGH 50: CPT | Mod: ,,, | Performed by: STUDENT IN AN ORGANIZED HEALTH CARE EDUCATION/TRAINING PROGRAM

## 2023-09-24 PROCEDURE — 21400001 HC TELEMETRY ROOM

## 2023-09-24 PROCEDURE — 25000003 PHARM REV CODE 250

## 2023-09-24 PROCEDURE — 63600175 PHARM REV CODE 636 W HCPCS: Performed by: STUDENT IN AN ORGANIZED HEALTH CARE EDUCATION/TRAINING PROGRAM

## 2023-09-24 PROCEDURE — 84100 ASSAY OF PHOSPHORUS: CPT | Performed by: STUDENT IN AN ORGANIZED HEALTH CARE EDUCATION/TRAINING PROGRAM

## 2023-09-24 PROCEDURE — 85025 COMPLETE CBC W/AUTO DIFF WBC: CPT | Performed by: STUDENT IN AN ORGANIZED HEALTH CARE EDUCATION/TRAINING PROGRAM

## 2023-09-24 PROCEDURE — 25000003 PHARM REV CODE 250: Performed by: NURSE PRACTITIONER

## 2023-09-24 PROCEDURE — 36415 COLL VENOUS BLD VENIPUNCTURE: CPT | Performed by: STUDENT IN AN ORGANIZED HEALTH CARE EDUCATION/TRAINING PROGRAM

## 2023-09-24 PROCEDURE — 80202 ASSAY OF VANCOMYCIN: CPT | Performed by: STUDENT IN AN ORGANIZED HEALTH CARE EDUCATION/TRAINING PROGRAM

## 2023-09-24 RX ORDER — POTASSIUM CHLORIDE 7.45 MG/ML
40 INJECTION INTRAVENOUS ONCE
Status: DISCONTINUED | OUTPATIENT
Start: 2023-09-24 | End: 2023-09-24

## 2023-09-24 RX ORDER — LORAZEPAM 1 MG/1
1 TABLET ORAL ONCE
Status: COMPLETED | OUTPATIENT
Start: 2023-09-24 | End: 2023-09-24

## 2023-09-24 RX ORDER — POTASSIUM CHLORIDE 20 MEQ/1
40 TABLET, EXTENDED RELEASE ORAL 3 TIMES DAILY
Status: COMPLETED | OUTPATIENT
Start: 2023-09-24 | End: 2023-09-24

## 2023-09-24 RX ORDER — POTASSIUM CHLORIDE 7.45 MG/ML
10 INJECTION INTRAVENOUS
Status: DISPENSED | OUTPATIENT
Start: 2023-09-24 | End: 2023-09-24

## 2023-09-24 RX ORDER — MAGNESIUM SULFATE 1 G/100ML
1 INJECTION INTRAVENOUS ONCE
Status: COMPLETED | OUTPATIENT
Start: 2023-09-24 | End: 2023-09-24

## 2023-09-24 RX ORDER — OXYCODONE AND ACETAMINOPHEN 10; 325 MG/1; MG/1
1 TABLET ORAL EVERY 4 HOURS PRN
Status: DISCONTINUED | OUTPATIENT
Start: 2023-09-24 | End: 2023-09-25 | Stop reason: HOSPADM

## 2023-09-24 RX ORDER — POTASSIUM CHLORIDE 20 MEQ/1
40 TABLET, EXTENDED RELEASE ORAL
Status: COMPLETED | OUTPATIENT
Start: 2023-09-24 | End: 2023-09-24

## 2023-09-24 RX ORDER — POTASSIUM CHLORIDE 7.45 MG/ML
10 INJECTION INTRAVENOUS
Status: COMPLETED | OUTPATIENT
Start: 2023-09-24 | End: 2023-09-24

## 2023-09-24 RX ADMIN — POTASSIUM CHLORIDE 40 MEQ: 1500 TABLET, EXTENDED RELEASE ORAL at 08:09

## 2023-09-24 RX ADMIN — PROCHLORPERAZINE EDISYLATE 5 MG: 5 INJECTION INTRAMUSCULAR; INTRAVENOUS at 10:09

## 2023-09-24 RX ADMIN — LORAZEPAM 1 MG: 1 TABLET ORAL at 03:09

## 2023-09-24 RX ADMIN — CEFEPIME 1 G: 1 INJECTION, POWDER, FOR SOLUTION INTRAMUSCULAR; INTRAVENOUS at 08:09

## 2023-09-24 RX ADMIN — MAGNESIUM SULFATE HEPTAHYDRATE 1 G: 500 INJECTION, SOLUTION INTRAMUSCULAR; INTRAVENOUS at 04:09

## 2023-09-24 RX ADMIN — CEFEPIME 1 G: 1 INJECTION, POWDER, FOR SOLUTION INTRAMUSCULAR; INTRAVENOUS at 12:09

## 2023-09-24 RX ADMIN — HYDROMORPHONE HYDROCHLORIDE 1 MG: 1 INJECTION, SOLUTION INTRAMUSCULAR; INTRAVENOUS; SUBCUTANEOUS at 05:09

## 2023-09-24 RX ADMIN — POTASSIUM CHLORIDE 10 MEQ: 7.46 INJECTION, SOLUTION INTRAVENOUS at 04:09

## 2023-09-24 RX ADMIN — POTASSIUM CHLORIDE 10 MEQ: 7.46 INJECTION, SOLUTION INTRAVENOUS at 08:09

## 2023-09-24 RX ADMIN — HYDROMORPHONE HYDROCHLORIDE 1 MG: 1 INJECTION, SOLUTION INTRAMUSCULAR; INTRAVENOUS; SUBCUTANEOUS at 08:09

## 2023-09-24 RX ADMIN — VANCOMYCIN HYDROCHLORIDE 1250 MG: 1.25 INJECTION, POWDER, LYOPHILIZED, FOR SOLUTION INTRAVENOUS at 10:09

## 2023-09-24 RX ADMIN — HYDROMORPHONE HYDROCHLORIDE 1 MG: 1 INJECTION, SOLUTION INTRAMUSCULAR; INTRAVENOUS; SUBCUTANEOUS at 01:09

## 2023-09-24 RX ADMIN — VANCOMYCIN HYDROCHLORIDE 1500 MG: 1.5 INJECTION, POWDER, LYOPHILIZED, FOR SOLUTION INTRAVENOUS at 11:09

## 2023-09-24 RX ADMIN — PROCHLORPERAZINE EDISYLATE 5 MG: 5 INJECTION INTRAMUSCULAR; INTRAVENOUS at 05:09

## 2023-09-24 RX ADMIN — POTASSIUM CHLORIDE 10 MEQ: 7.46 INJECTION, SOLUTION INTRAVENOUS at 06:09

## 2023-09-24 RX ADMIN — FOLIC ACID 1 MG: 1 TABLET ORAL at 08:09

## 2023-09-24 RX ADMIN — POTASSIUM CHLORIDE 40 MEQ: 1500 TABLET, EXTENDED RELEASE ORAL at 09:09

## 2023-09-24 RX ADMIN — CEFEPIME 1 G: 1 INJECTION, POWDER, FOR SOLUTION INTRAMUSCULAR; INTRAVENOUS at 05:09

## 2023-09-24 RX ADMIN — POTASSIUM CHLORIDE 40 MEQ: 1500 TABLET, EXTENDED RELEASE ORAL at 03:09

## 2023-09-24 RX ADMIN — OXYCODONE AND ACETAMINOPHEN 1 TABLET: 10; 325 TABLET ORAL at 05:09

## 2023-09-24 RX ADMIN — PROCHLORPERAZINE EDISYLATE 5 MG: 5 INJECTION INTRAMUSCULAR; INTRAVENOUS at 12:09

## 2023-09-24 RX ADMIN — POTASSIUM CHLORIDE 40 MEQ: 1500 TABLET, EXTENDED RELEASE ORAL at 05:09

## 2023-09-24 RX ADMIN — HYDROMORPHONE HYDROCHLORIDE 1 MG: 1 INJECTION, SOLUTION INTRAMUSCULAR; INTRAVENOUS; SUBCUTANEOUS at 03:09

## 2023-09-24 RX ADMIN — METRONIDAZOLE 500 MG: 5 INJECTION, SOLUTION INTRAVENOUS at 06:09

## 2023-09-24 RX ADMIN — PANTOPRAZOLE SODIUM 40 MG: 40 INJECTION, POWDER, FOR SOLUTION INTRAVENOUS at 08:09

## 2023-09-24 RX ADMIN — METRONIDAZOLE 500 MG: 5 INJECTION, SOLUTION INTRAVENOUS at 01:09

## 2023-09-24 RX ADMIN — METRONIDAZOLE 500 MG: 5 INJECTION, SOLUTION INTRAVENOUS at 08:09

## 2023-09-24 RX ADMIN — POTASSIUM CHLORIDE 10 MEQ: 7.46 INJECTION, SOLUTION INTRAVENOUS at 10:09

## 2023-09-24 RX ADMIN — OXYCODONE AND ACETAMINOPHEN 1 TABLET: 10; 325 TABLET ORAL at 10:09

## 2023-09-24 RX ADMIN — POTASSIUM CHLORIDE 40 MEQ: 1500 TABLET, EXTENDED RELEASE ORAL at 04:09

## 2023-09-24 RX ADMIN — HYDROMORPHONE HYDROCHLORIDE 1 MG: 1 INJECTION, SOLUTION INTRAMUSCULAR; INTRAVENOUS; SUBCUTANEOUS at 10:09

## 2023-09-24 RX ADMIN — BICTEGRAVIR SODIUM, EMTRICITABINE, AND TENOFOVIR ALAFENAMIDE FUMARATE 1 TABLET: 50; 200; 25 TABLET ORAL at 08:09

## 2023-09-24 RX ADMIN — POTASSIUM CHLORIDE 10 MEQ: 7.46 INJECTION, SOLUTION INTRAVENOUS at 09:09

## 2023-09-24 NOTE — SUBJECTIVE & OBJECTIVE
Interval History: Pt seen and examined this morning on eryn ARTEAGA. Minimally able to tolerate regular diet again, though cafeteria also sent up very fatty foods; discussed changing to bland diet. Abdominal pain better controlled since increasing dilaudid. Encouraged PO analgesics as well. Otherwise, doing well and with no further complaints at this time.      Objective:     Vital Signs (Most Recent):  Temp: 98.5 °F (36.9 °C) (09/24/23 1142)  Pulse: 88 (09/24/23 1142)  Resp: 18 (09/24/23 1142)  BP: 138/80 (09/24/23 1142)  SpO2: 100 % (09/24/23 1142) Vital Signs (24h Range):  Temp:  [97.8 °F (36.6 °C)-99.6 °F (37.6 °C)] 98.5 °F (36.9 °C)  Pulse:  [] 88  Resp:  [16-19] 18  SpO2:  [91 %-100 %] 100 %  BP: (130-156)/(67-89) 138/80     Weight: 75.3 kg (166 lb)  Body mass index is 23.82 kg/m².    Intake/Output Summary (Last 24 hours) at 9/24/2023 1214  Last data filed at 9/24/2023 1100  Gross per 24 hour   Intake 300 ml   Output 4600 ml   Net -4300 ml           Physical Exam  Gen: in NAD, appears stated age  Neuro: AAOx4, CN2-12 grossly intact BL; motor, sensory, and strength grossly intact BL  HEENT: NTNC, EOMI, PERRLA, MMM; no thyromegaly or lymphadenopathy; no JVD appreciated  CVS: RRR, no m/r/g; S1/S2 auscultated with no S3 or S4; capillary refill < 2 sec  Resp: lungs CTAB, no w/r/r; no belabored breathing or accessory muscle use appreciated   Abd: BS+ in all 4 quadrants; diffusely TTP, guarding present; soft to palpation; no organomegaly appreciated   Extrem: pulses full, equal, and regular over all 4 extremities; no UE or LE edema BL      Significant Labs: All pertinent labs within the past 24 hours have been reviewed.    Significant Imaging: I have reviewed all pertinent imaging results/findings within the past 24 hours.

## 2023-09-24 NOTE — PLAN OF CARE
Problem: Adult Inpatient Plan of Care  Goal: Plan of Care Review  Outcome: Ongoing, Progressing  Goal: Absence of Hospital-Acquired Illness or Injury  Outcome: Ongoing, Progressing  Goal: Optimal Comfort and Wellbeing  Outcome: Ongoing, Progressing     Problem: Adjustment to Illness (Gastrointestinal Bleeding)  Goal: Optimal Coping with Acute Illness  Outcome: Ongoing, Progressing     Problem: Bleeding (Gastrointestinal Bleeding)  Goal: Hemostasis  Outcome: Ongoing, Progressing     Problem: Infection  Goal: Absence of Infection Signs and Symptoms  Outcome: Ongoing, Progressing     Problem: HIV/AIDS Infection  Goal: HIV/AIDS Symptom Control  Outcome: Ongoing, Progressing     Problem: Anemia  Goal: Anemia Symptom Improvement  Outcome: Ongoing, Progressing     Problem: Pain Acute  Goal: Acceptable Pain Control and Functional Ability  Outcome: Ongoing, Progressing     Problem: Bowel Elimination/Continence Impairment  Goal: Effective Bowel Elimination/Continence  Outcome: Ongoing, Progressing     Pt progressing toward goals. No distress noted. No falls or injuries during shift. Pt bed in lowest position. Side rails x2. Call bell and personal belongs within reach. Telemetry maintained. Safety precautions maintained.     I received the liver biopsy pathology report dated 5/24/17, scanned into epic.

## 2023-09-24 NOTE — TREATMENT PLAN
GI Treatment Plan    Tasia Cardona is a 23 y.o. male admitted to hospital 9/17/2023 (Hospital Day: 8) due to Acute necrotizing pancreatitis.     Interval History  Abdominal pain stable from yesterday  Still getting IV dilaudid frequently  Ate min yesterday which worsened his pain    Objective  Temp:  [97.8 °F (36.6 °C)-99.6 °F (37.6 °C)] 98.3 °F (36.8 °C) (09/24 0809)  Pulse:  [] 86 (09/24 0809)  BP: (120-156)/(67-89) 134/87 (09/24 0809)  Resp:  [16-19] 18 (09/24 1006)  SpO2:  [91 %-99 %] 99 % (09/24 0809)    General: Alert, Oriented x3, no distress  Abdomen: Normoactive bowel sounds. Non-distended.  Soft. Tender to palpation worse at epigastrium . No peritoneal signs.    Laboratory    Recent Labs   Lab 09/22/23  0505 09/23/23  0449 09/24/23  0229   HGB 10.4* 10.6* 9.5*         Assessment and Plan    24 yo M who presented with epigastric pain, n/v and hematemesis. Initial lipase wnl, underwent EGD which showed only MW tear and mild gastritis. Later developed acute pancreatitis as evidence by worsening epigastric pain, elevated lipase and CT showing necrotizing pancreatitis without organized fluid collections. Supportive care. Possible sludge on initial CT but otherwise no cholelithiasis, no alcohol use.    Recommendations  - Supportive care with PO and conservative IV hydration, anti-emetics and pain control  - Low fat diet    - We will continue to follow.    Thank you for involving us in the care of Tasia Cardona. Please call with any additional questions, concerns or changes in the patient's clinical status.    Adán Schaefer MD  Gastroenterology Fellow, PGY V

## 2023-09-24 NOTE — PLAN OF CARE
Problem: Adult Inpatient Plan of Care  Goal: Plan of Care Review  Outcome: Ongoing, Progressing  Goal: Absence of Hospital-Acquired Illness or Injury  Outcome: Ongoing, Progressing  Goal: Optimal Comfort and Wellbeing  Outcome: Ongoing, Progressing  Goal: Readiness for Transition of Care  Outcome: Ongoing, Progressing     Problem: Adjustment to Illness (Gastrointestinal Bleeding)  Goal: Optimal Coping with Acute Illness  Outcome: Ongoing, Progressing     Problem: Bleeding (Gastrointestinal Bleeding)  Goal: Hemostasis  Outcome: Ongoing, Progressing     Problem: Infection  Goal: Absence of Infection Signs and Symptoms  Outcome: Ongoing, Progressing     Problem: HIV/AIDS Infection  Goal: HIV/AIDS Symptom Control  Outcome: Ongoing, Progressing     Problem: Anemia  Goal: Anemia Symptom Improvement  Outcome: Ongoing, Progressing     Problem: Pain Acute  Goal: Acceptable Pain Control and Functional Ability  Outcome: Ongoing, Progressing     Problem: Bowel Elimination/Continence Impairment  Goal: Effective Bowel Elimination/Continence  Outcome: Ongoing, Progressing     Pt progressing towards goals. No distress notice. No falls or injuries during shift. Bed in lowest position, call light within reach, belonging at bedside. Safety precaution maintain.Plan of Care reviewed. Pt verbalized understanding.

## 2023-09-24 NOTE — PROGRESS NOTES
"[facility] [address] [place] [address] Medicine  Progress Note    Patient Name: Tasia Cardona  MRN: [id number]  Patient Class: IP- Inpatient   Admission Date: 9/17/2023  Length of Stay: 4 days  Attending Physician: Jimbo Flor MD  Primary Care Provider: Brittany, Primary Doctor        Subjective:     Principal Problem:Acute necrotizing pancreatitis        HPI:  Tasia Cardona is a 23 y.o. male with a PMHx of HIV, Asthma, and polycythemia vera presenting with a c/o facial pain and hematemesis. The patient states that the vomiting began 4 days ago and was initially mild but quickly progressed to hematemesis. States that he sees bright red blood sometimes clots each time he vomits. Today he is had 5-6 episodes prior to presentation in the emergency department. He feels weak, admits abdominal discomfort and significant nausea. He attempted to take ibuprofen to help with his symptoms however this did not have any benefit he reported. Patient has not been able to tolerate any oral intake for the past 2 days given the amount of nausea. Additionally patient admits significant jaw pain primarily on the left near the TMJ junction. This has began progressively with the episodes of vomiting. He also notes he has been having watery diarrhea with 1-2 episodes of dark "clot-like" bowel movements for several months. He denies any LOC, syncope, or fatigue. Pt was recently seen by colorectal surgery (9/12/23) for similar complaints and was prescribed a PPI and antiemetic medication, as well as given a GI referral for EGD/colonoscopy.    In ED VSS, afebrile. CBC with mild anemia. PT/INR WNL. Bicarb 20. Calcium 8.5. AST 63. Lipase 12. Hepatitis C Ab reactive, hepatitis C quantitative PCR in process. CXR-Cardiomediastinal silhouette is not enlarged. No focal consolidation. No sizable pleural effusion. No pneumothorax. Given 80mg protonix, 4mg morhpine, & 4mg zofran IVP.       Overview/Hospital Course:  Tasia Cardona was placed in  " observation for workup of GI bleeding. Hgb 11.1, below baseline. GI consulted and performed EGD revealing Corinne-Chauhan tear, Erythematous mucosa in the antrum (bx taken), and Congested duodenal mucosa (bx taken). CLD. Antiemetics scheduled to prevent retching. Continue protonix BID (IV while unable to tolerate PO). Pt with new severe epigastric pain on the day following the EGD. IV pain control, making adjustments as needed. Lipase now elevated to 263 (12 on admit). Begin tx for pancreatitis with aggressive IVF. 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 Heme consult for assistance with CMV serology reactive, discussing tx recs with ID. Repeat CD4 pending.  Collect stool studies if able.  DC when medically stable with plan for outpatient colonoscopy and GI f/u. Path results pending. Heme/onc recommended against AC of splenic thrombosis.    Pt noted to become tachycardic with increased WBCs on 9/21. ID consulted for assistance. Tmax 101 into 9/23, abx broadened to vanc and cefepime.      Interval History: Pt seen and examined this morning on devon. CHANDLER. Minimally able to tolerate regular diet again, though cafeteria also sent up very fatty foods; discussed changing to bland diet. Abdominal pain better controlled since increasing dilaudid. Encouraged PO analgesics as well. Otherwise, doing well and with no further complaints at this time.      Objective:     Vital Signs (Most Recent):  Temp: 98.5 °F (36.9 °C) (09/24/23 1142)  Pulse: 88 (09/24/23 1142)  Resp: 18 (09/24/23 1142)  BP: 138/80 (09/24/23 1142)  SpO2: 100 % (09/24/23 1142) Vital Signs (24h Range):  Temp:  [97.8 °F (36.6 °C)-99.6 °F (37.6 °C)] 98.5 °F (36.9 °C)  Pulse:  [] 88  Resp:  [16-19] 18  SpO2:  [91 %-100 %] 100 %  BP: (130-156)/(67-89) 138/80     Weight: 75.3 kg (166 lb)  Body mass index is 23.82 kg/m².    Intake/Output Summary (Last 24 hours) at 9/24/2023 1214  Last data  "filed at 9/24/2023 1100  Gross per 24 hour   Intake 300 ml   Output 4600 ml   Net -4300 ml           Physical Exam  Gen: in NAD, appears stated age  Neuro: AAOx4, CN2-12 grossly intact BL; motor, sensory, and strength grossly intact BL  HEENT: NTNC, EOMI, PERRLA, MMM; no thyromegaly or lymphadenopathy; no JVD appreciated  CVS: RRR, no m/r/g; S1/S2 auscultated with no S3 or S4; capillary refill < 2 sec  Resp: lungs CTAB, no w/r/r; no belabored breathing or accessory muscle use appreciated   Abd: BS+ in all 4 quadrants; diffusely TTP, guarding present; soft to palpation; no organomegaly appreciated   Extrem: pulses full, equal, and regular over all 4 extremities; no UE or LE edema BL      Significant Labs: All pertinent labs within the past 24 hours have been reviewed.    Significant Imaging: I have reviewed all pertinent imaging results/findings within the past 24 hours.      Assessment/Plan:      * Acute necrotizing pancreatitis  - Interval history and physical exam findings as described above  - Imaging reviewed  - IVF provided  - Increasing diet as tolerated  - GI following  - Tmax 101  - On empiric vanc and cefepime, ID following  - PRN analgesics provided    Epigastric pain  - As above      Splenic vein thrombosis  - Noted on imaging  - Heme/onc consulted: no AC at this time    Corinne-Chauhan tear  - S/p EGD with GI  - Continue PPI    HIV infection  - Continue HAART  - Repeat CD4 pending  - CMV serology positive   - Hep C Ab pos, PCR neg  - Stool studies if able to collect   - ID following    Mild intermittent asthma without complication  - Currently asymptomatic, not in acute exacerbation  - PRN duonebs provided    Polycythemia  - Chronic issue, stable  - Follows with hematology outpatient    Hepatitis C antibody positive in blood  - PCR not detected.  - CT abdomen with "hepatomegaly with geographic areas of hepatic steatosis and areas of relative fatty sparing."  - Will need to see hepatology " outpatient    Normocytic anemia  - H/H stable near baseline  - Will continue to monitor on daily labs      VTE Risk Mitigation (From admission, onward)         Ordered     Place sequential compression device  Until discontinued         09/17/23 2307     IP VTE LOW RISK PATIENT  Once         09/17/23 2307                Discharge Planning   CASTILLO: 9/27/2023     Code Status: Full Code   Is the patient medically ready for discharge?: No    Reason for patient still in hospital (select all that apply): Patient trending condition  Discharge Plan A: Home   Discharge Delays: None known at this time        Jimbo Flor MD  Attending Physician  Medical Director - [facility] [place] [facility]  Department of Hospital Medicine  9/24/2023

## 2023-09-24 NOTE — CONSULTS
Pt has concerns with risks of PICC, MD notified, pt with 2 working IV's at this time. MD to discuss with pt.

## 2023-09-25 VITALS
WEIGHT: 166 LBS | DIASTOLIC BLOOD PRESSURE: 65 MMHG | HEIGHT: 70 IN | RESPIRATION RATE: 18 BRPM | BODY MASS INDEX: 23.77 KG/M2 | HEART RATE: 96 BPM | OXYGEN SATURATION: 95 % | SYSTOLIC BLOOD PRESSURE: 137 MMHG | TEMPERATURE: 100 F

## 2023-09-25 LAB
ALBUMIN SERPL BCP-MCNC: 2.4 G/DL (ref 3.5–5.2)
ALP SERPL-CCNC: 188 U/L (ref 55–135)
ALT SERPL W/O P-5'-P-CCNC: 16 U/L (ref 10–44)
ANION GAP SERPL CALC-SCNC: 7 MMOL/L (ref 8–16)
AST SERPL-CCNC: 37 U/L (ref 10–40)
BACTERIA STL CULT: NORMAL
BASOPHILS # BLD AUTO: 0.06 K/UL (ref 0–0.2)
BASOPHILS NFR BLD: 0.5 % (ref 0–1.9)
BILIRUB SERPL-MCNC: 0.4 MG/DL (ref 0.1–1)
BUN SERPL-MCNC: <3 MG/DL (ref 6–20)
CALCIUM SERPL-MCNC: 8.7 MG/DL (ref 8.7–10.5)
CHLORIDE SERPL-SCNC: 101 MMOL/L (ref 95–110)
CO2 SERPL-SCNC: 26 MMOL/L (ref 23–29)
CREAT SERPL-MCNC: 0.5 MG/DL (ref 0.5–1.4)
DIFFERENTIAL METHOD: ABNORMAL
E COLI SXT1 STL QL IA: NEGATIVE
E COLI SXT2 STL QL IA: NEGATIVE
EOSINOPHIL # BLD AUTO: 0.3 K/UL (ref 0–0.5)
EOSINOPHIL NFR BLD: 2.5 % (ref 0–8)
ERYTHROCYTE [DISTWIDTH] IN BLOOD BY AUTOMATED COUNT: 19.7 % (ref 11.5–14.5)
EST. GFR  (NO RACE VARIABLE): >60 ML/MIN/1.73 M^2
FINAL PATHOLOGIC DIAGNOSIS: NORMAL
GLUCOSE SERPL-MCNC: 84 MG/DL (ref 70–110)
HCT VFR BLD AUTO: 32.2 % (ref 40–54)
HGB BLD-MCNC: 9.9 G/DL (ref 14–18)
IMM GRANULOCYTES # BLD AUTO: 0.3 K/UL (ref 0–0.04)
IMM GRANULOCYTES NFR BLD AUTO: 2.4 % (ref 0–0.5)
LYMPHOCYTES # BLD AUTO: 1.7 K/UL (ref 1–4.8)
LYMPHOCYTES NFR BLD: 13.8 % (ref 18–48)
Lab: NORMAL
MAGNESIUM SERPL-MCNC: 1.8 MG/DL (ref 1.6–2.6)
MCH RBC QN AUTO: 28.9 PG (ref 27–31)
MCHC RBC AUTO-ENTMCNC: 30.7 G/DL (ref 32–36)
MCV RBC AUTO: 94 FL (ref 82–98)
MONOCYTES # BLD AUTO: 1.6 K/UL (ref 0.3–1)
MONOCYTES NFR BLD: 13 % (ref 4–15)
NEUTROPHILS # BLD AUTO: 8.4 K/UL (ref 1.8–7.7)
NEUTROPHILS NFR BLD: 67.8 % (ref 38–73)
NRBC BLD-RTO: 0 /100 WBC
PHOSPHATE SERPL-MCNC: 2.7 MG/DL (ref 2.7–4.5)
PLATELET # BLD AUTO: 341 K/UL (ref 150–450)
PMV BLD AUTO: 10.3 FL (ref 9.2–12.9)
POTASSIUM SERPL-SCNC: 3.9 MMOL/L (ref 3.5–5.1)
PROT SERPL-MCNC: 5.9 G/DL (ref 6–8.4)
RBC # BLD AUTO: 3.43 M/UL (ref 4.6–6.2)
SODIUM SERPL-SCNC: 134 MMOL/L (ref 136–145)
WBC # BLD AUTO: 12.42 K/UL (ref 3.9–12.7)

## 2023-09-25 PROCEDURE — 80321 ALCOHOLS BIOMARKERS 1OR 2: CPT | Performed by: STUDENT IN AN ORGANIZED HEALTH CARE EDUCATION/TRAINING PROGRAM

## 2023-09-25 PROCEDURE — 36415 COLL VENOUS BLD VENIPUNCTURE: CPT | Performed by: STUDENT IN AN ORGANIZED HEALTH CARE EDUCATION/TRAINING PROGRAM

## 2023-09-25 PROCEDURE — 63600175 PHARM REV CODE 636 W HCPCS: Performed by: STUDENT IN AN ORGANIZED HEALTH CARE EDUCATION/TRAINING PROGRAM

## 2023-09-25 PROCEDURE — 99239 PR HOSPITAL DISCHARGE DAY,>30 MIN: ICD-10-PCS | Mod: ,,, | Performed by: STUDENT IN AN ORGANIZED HEALTH CARE EDUCATION/TRAINING PROGRAM

## 2023-09-25 PROCEDURE — 84100 ASSAY OF PHOSPHORUS: CPT | Performed by: STUDENT IN AN ORGANIZED HEALTH CARE EDUCATION/TRAINING PROGRAM

## 2023-09-25 PROCEDURE — 25000003 PHARM REV CODE 250: Performed by: NURSE PRACTITIONER

## 2023-09-25 PROCEDURE — 85025 COMPLETE CBC W/AUTO DIFF WBC: CPT | Performed by: STUDENT IN AN ORGANIZED HEALTH CARE EDUCATION/TRAINING PROGRAM

## 2023-09-25 PROCEDURE — 99239 HOSP IP/OBS DSCHRG MGMT >30: CPT | Mod: ,,, | Performed by: STUDENT IN AN ORGANIZED HEALTH CARE EDUCATION/TRAINING PROGRAM

## 2023-09-25 PROCEDURE — 83735 ASSAY OF MAGNESIUM: CPT | Performed by: STUDENT IN AN ORGANIZED HEALTH CARE EDUCATION/TRAINING PROGRAM

## 2023-09-25 PROCEDURE — 80053 COMPREHEN METABOLIC PANEL: CPT | Performed by: STUDENT IN AN ORGANIZED HEALTH CARE EDUCATION/TRAINING PROGRAM

## 2023-09-25 PROCEDURE — 25000003 PHARM REV CODE 250

## 2023-09-25 RX ORDER — PANTOPRAZOLE SODIUM 40 MG/1
40 TABLET, DELAYED RELEASE ORAL 2 TIMES DAILY
Qty: 180 TABLET | Refills: 0 | Status: SHIPPED | OUTPATIENT
Start: 2023-09-25 | End: 2023-10-12

## 2023-09-25 RX ORDER — OXYCODONE AND ACETAMINOPHEN 10; 325 MG/1; MG/1
1 TABLET ORAL EVERY 4 HOURS PRN
Qty: 30 TABLET | Refills: 0 | Status: SHIPPED | OUTPATIENT
Start: 2023-09-25 | End: 2023-09-30

## 2023-09-25 RX ORDER — METRONIDAZOLE 500 MG/1
500 TABLET ORAL EVERY 8 HOURS
Qty: 21 TABLET | Refills: 0 | Status: SHIPPED | OUTPATIENT
Start: 2023-09-25 | End: 2023-10-02

## 2023-09-25 RX ORDER — CEFDINIR 300 MG/1
300 CAPSULE ORAL 2 TIMES DAILY
Qty: 14 CAPSULE | Refills: 0 | Status: SHIPPED | OUTPATIENT
Start: 2023-09-25 | End: 2023-10-02

## 2023-09-25 RX ORDER — CIPROFLOXACIN 500 MG/1
500 TABLET ORAL EVERY 12 HOURS
Qty: 14 TABLET | Refills: 0 | Status: SHIPPED | OUTPATIENT
Start: 2023-09-25 | End: 2023-10-02

## 2023-09-25 RX ORDER — FOLIC ACID 1 MG/1
1 TABLET ORAL DAILY
Qty: 90 TABLET | Refills: 0 | Status: SHIPPED | OUTPATIENT
Start: 2023-09-26 | End: 2023-10-12

## 2023-09-25 RX ADMIN — SENNOSIDES 8.6 MG: 8.6 TABLET, FILM COATED ORAL at 09:09

## 2023-09-25 RX ADMIN — BICTEGRAVIR SODIUM, EMTRICITABINE, AND TENOFOVIR ALAFENAMIDE FUMARATE 1 TABLET: 50; 200; 25 TABLET ORAL at 09:09

## 2023-09-25 RX ADMIN — FOLIC ACID 1 MG: 1 TABLET ORAL at 09:09

## 2023-09-25 RX ADMIN — HYDROMORPHONE HYDROCHLORIDE 1 MG: 1 INJECTION, SOLUTION INTRAMUSCULAR; INTRAVENOUS; SUBCUTANEOUS at 02:09

## 2023-09-25 NOTE — PLAN OF CARE
CHW scheduled the [facility] hospital follow up with P[facility] for September 30 @ 12:00pm    CHW unable to schedule the Gastroenterology and Infectious Disease hospital follow ups. A message was sent to the clinics requesting an appointment on the patients behalf. I added this information to the AVS as a reminder for the patient.

## 2023-09-25 NOTE — PROGRESS NOTES
Pharmacokinetic Assessment Follow Up: IV Vancomycin    Vancomycin serum concentration assessment(s):    The trough level was drawn correctly and can be used to guide therapy at this time. The measurement is below the desired definitive target range of 10 to 20 mcg/mL.    Vancomycin Regimen Plan:    Change regimen to Vancomycin 1500 mg IV every 12 hours with next serum trough concentration measured at 0930 prior to 4th dose on 09/26/23.    Drug levels (last 3 results):  Recent Labs   Lab Result Units 09/24/23  1859   Vancomycin-Trough ug/mL 7.6*       Pharmacy will continue to follow and monitor vancomycin.    Please contact pharmacy at extension [phone] for questions regarding this assessment.    Thank you for the consult,   Min Branch       Patient brief summary:  Tasia Cardona is a 23 y.o. male initiated on antimicrobial therapy with IV Vancomycin for treatment of intra-abdominal infection        Drug Allergies:   Review of patient's allergies indicates:   Allergen Reactions    M[place]ill Swelling    Pcn [penicillins] Hives    Pecan nut Swelling    Soy     Sulfa (sulfonamide antibiotics) Hives       Actual Body Weight:   75.3 kg    Renal Function:   Estimated Creatinine Clearance: 235.2 mL/min (based on SCr of 0.5 mg/dL).,     Dialysis Method (if applicable):  N/A    CBC (last 72 hours):  Recent Labs   Lab Result Units 09/22/23  0505 09/23/23  0449 09/24/23  0229   WBC K/uL 11.81 9.90 10.88   Hemoglobin g/dL 10.4* 10.6* 9.5*   Hematocrit % 32.3* 32.0* 28.4*   Platelets K/uL 151 194 240   Gran % % 73.0 72.4 72.8   Lymph % % 9.9* 11.2* 11.0*   Mono % % 15.7* 14.8 13.6   Eosinophil % % 0.3 1.0 1.6   Basophil % % 0.3 0.2 0.2   Differential Method  Automated Automated Automated       Metabolic Panel (last 72 hours):  Recent Labs   Lab Result Units 09/22/23  0505 09/22/23  1015 09/23/23  0449 09/24/23  0229   Sodium mmol/L 132*  --  133* 136   Potassium mmol/L 2.6*  --  2.8* 2.7*   Chloride mmol/L 96  --  97 97   CO2  mmol/L 27  --  28 29   Glucose mg/dL 88  --  93 107   Glucose, UA   --  Negative  --   --    BUN mg/dL 4*  --  <3* <3*   Creatinine mg/dL 0.6  --  0.6 0.5   Albumin g/dL 2.0*  --  2.2* 2.0*   Total Bilirubin mg/dL 0.9  --  0.6 0.5   Alkaline Phosphatase U/L 87  --  128 146*   AST U/L 27  --  28 36   ALT U/L 13  --  12 16   Magnesium mg/dL 1.4*  --  2.2 1.9   Phosphorus mg/dL 1.8*  --  1.9* 4.2       Vancomycin Administrations:  vancomycin given in the last 96 hours                     vancomycin 1,250 mg in dextrose 5 % (D5W) 250 mL IVPB (Vial-Mate) (mg) 1,250 mg New Bag 09/24/23 1043     1,250 mg New Bag 09/23/23 2241    vancomycin 1,500 mg in dextrose 5 % (D5W) 250 mL IVPB (Vial-Mate) (mg) 1,500 mg New Bag 09/23/23 0637                    Microbiologic Results:  Microbiology Results (last 7 days)       Procedure Component Value Units Date/Time    Blood culture [[id number]] Collected: 09/23/23 0613    Order Status: Completed Specimen: Blood Updated: 09/24/23 0812     Blood Culture, Routine No Growth to date      No Growth to date    Blood culture [[id number]] Collected: 09/23/23 0448    Order Status: Completed Specimen: Blood Updated: 09/24/23 0612     Blood Culture, Routine No Growth to date      No Growth to date    Stool culture [[id number]] Collected: 09/22/23 1016    Order Status: Completed Specimen: Stool Updated: 09/23/23 1345     Stool Culture Culture in progress    E. coli 0157 antigen [[id number]] Collected: 09/22/23 1016    Order Status: No result Specimen: Stool Updated: 09/22/23 1145    Clostridium difficile EIA [[id number]]     Order Status: Canceled Specimen: Stool

## 2023-09-25 NOTE — PROGRESS NOTES
Reviewed avs with pt. No questions at this time. Pt headed downstairs to pharmacy to  medications discharging home.

## 2023-09-25 NOTE — TREATMENT PLAN
GI Treatment Plan    Tasia Cardona is a 23 y.o. male admitted to hospital 9/17/2023 (Hospital Day: 9) due to Acute necrotizing pancreatitis.     Interval History  Abdominal pain decreased from yesterday per patient  Still getting IV dilaudid frequently (5x yesterday)  Reports last BM was last night    Objective  Temp:  [97.9 °F (36.6 °C)-98.7 °F (37.1 °C)] 98.4 °F (36.9 °C) (09/25 0701)  Pulse:  [84-96] 93 (09/25 0701)  BP: (115-139)/(56-81) 128/79 (09/25 0701)  Resp:  [18] 18 (09/25 0701)  SpO2:  [95 %-100 %] 95 % (09/25 0900)    General: Alert, Oriented x3, no distress  Abdomen: Normoactive bowel sounds. Non-distended.  Soft. Tender to palpation worse at epigastrium . No peritoneal signs.    Laboratory    Recent Labs   Lab 09/23/23  0449 09/24/23  0229 09/25/23  0506   HGB 10.6* 9.5* 9.9*         Assessment and Plan    24 yo M who presented with epigastric pain, n/v and hematemesis. Initial lipase wnl, underwent EGD which showed only MW tear and mild gastritis. Later developed acute pancreatitis as evidence by worsening epigastric pain, elevated lipase and CT showing necrotizing pancreatitis without organized fluid collections. Supportive care. Possible sludge on initial CT but otherwise no cholelithiasis, no alcohol use. Still receiving IV Dilaudid round the clock.     Recommendations  - Supportive care with scheduled anti-emetics, and PRN pain control. Attempt to wean off Dilaudid and switch to PO medications.   - Can stop IVF now.   - PO PPI BID.   - MiraLAX Qday.   - Low fat diet  - Counseled the patient to avoid smoking, vaping and EtOH use completely. He understood.   - We will continue to follow. Will arrange follow up with us in GI clinic.     Thank you for involving us in the care of Tasia Cardona. Please call with any additional questions, concerns or changes in the patient's clinical status.    Tony Matos MD  Gastroenterology Fellow, PGY V

## 2023-09-25 NOTE — PLAN OF CARE
[facility] 11H  Discharge Final Note    Primary Care Provider: No, Primary Doctor    Expected Discharge Date: 9/25/2023    Pt discharged home with no services.  Abbe Capone, RN,BSN          Final Discharge Note (most recent)       Final Note - 09/25/23 1156          Final Note    Assessment Type Final Discharge Note     Anticipated Discharge Disposition Home or Self Care     Hospital Resources/Appts/Education Provided Provided patient/caregiver with written discharge plan information;Appointments scheduled and added to AVS;Post-Acute resouces added to AVS        Post-Acute Status    Discharge Delays None known at this time                     Important Message from Medicare             Contact Info       Pina Jones NP   Specialty: Family Medicine    [address] [facility] [address]   Phone: [phone]       Next Steps: Follow up    Instructions: Hospital follow up visit at 12:00pm, please arrive 20 minutes early. Please bring your hospital discharge summary, current medications, insurance card and photo ID    Infectious Disease    Infectious Disease   [phone]       Next Steps: Follow up    Instructions: A message has been sent to the Infectious Disease clinic, the clinic nurse will contact you to schedule a hospital follow up visit. However, if you do not hear from them within 24 to 48 hours of discharge please call to schedule the appointment.    Gastroenterology    Gastroenterology   [phone]       Next Steps: Follow up    Instructions: A message has been sent to the Gastroenterology clinic, the clinic nurse will contact you to schedule a hospital follow up visit. However, if you do not hear from them within 24 to 48 hours of discharge please call to schedule the appointment.

## 2023-09-25 NOTE — DISCHARGE SUMMARY
"[facility] - Observation [address] [place] [address] Medicine  Discharge Summary      Patient Name: Tasia Cardona  MRN: [id number]  HEATHER: [id number]  Patient Class: IP- Inpatient  Admission Date: 9/17/2023  Hospital Length of Stay: 5 days  Discharge Date and Time:  09/25/2023 12:28 PM  Attending Physician: Brittany att. providers found   Discharging Provider: Jimbo Flor MD  Primary Care Provider: Brittany, Primary Doctor  [facility] Medicine Team: [facility]  Jimbo Flor MD  Primary Care Team: [facility]     HPI:   Tasia Cardona is a 23 y.o. male with a PMHx of HIV, Asthma, and polycythemia vera presenting with a c/o facial pain and hematemesis. The patient states that the vomiting began 4 days ago and was initially mild but quickly progressed to hematemesis. States that he sees bright red blood sometimes clots each time he vomits. Today he is had 5-6 episodes prior to presentation in the emergency department. He feels weak, admits abdominal discomfort and significant nausea. He attempted to take ibuprofen to help with his symptoms however this did not have any benefit he reported. Patient has not been able to tolerate any oral intake for the past 2 days given the amount of nausea. Additionally patient admits significant jaw pain primarily on the left near the TMJ junction. This has began progressively with the episodes of vomiting. He also notes he has been having watery diarrhea with 1-2 episodes of dark "clot-like" bowel movements for several months. He denies any LOC, syncope, or fatigue. Pt was recently seen by colorectal surgery (9/12/23) for similar complaints and was prescribed a PPI and antiemetic medication, as well as given a GI referral for EGD/colonoscopy.    In ED VSS, afebrile. CBC with mild anemia. PT/INR WNL. Bicarb 20. Calcium 8.5. AST 63. Lipase 12. Hepatitis C Ab reactive, hepatitis C quantitative PCR in process. CXR-Cardiomediastinal silhouette is not enlarged. No focal consolidation. No sizable pleural " effusion. No pneumothorax. Given 80mg protonix, 4mg morhpine, & 4mg zofran IVP.       Procedure(s) (LRB):  EGD (ESOPHAGOGASTRODUODENOSCOPY) (N/A)      Hospital Course:   Tasia Cardona was placed in  observation for workup of GI bleeding. Hgb 11.1, below baseline. GI consulted and performed EGD revealing Corinne-Chauhan tear, Erythematous mucosa in the antrum (bx taken), and Congested duodenal mucosa (bx taken). CLD. Antiemetics scheduled to prevent retching. Continue protonix BID (IV while unable to tolerate PO). Pt with new severe epigastric pain on the day following the EGD. IV pain control, making adjustments as needed. Lipase now elevated to 263 (12 on admit). Begin tx for pancreatitis with aggressive IVF. 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 Heme consult for assistance with CMV serology reactive, discussing tx recs with ID. Repeat CD4 pending.  Collect stool studies if able.  DC when medically stable with plan for outpatient colonoscopy and GI f/u. Path results pending. Heme/onc recommended against AC of splenic thrombosis.    Pt noted to become tachycardic with increased WBCs on 9/21. ID consulted for assistance. Tmax 101 into 9/23, abx broadened to vanc and cefepime, then remained afebrile. Pt tolerating PO, pain better controlled, and eager to leave into 9/25. Pt deemed appropriate for discharge on PO abx with outpatient GI appt. Plan discussed with pt, who was agreeable and amenable; medications were discussed and reviewed, outpatient follow-up scheduled, ER precautions were given, all questions were answered to the pt's satisfaction, and Mr. Cardona was subsequently discharged.         Goals of Care Treatment Preferences:  Code Status: Full Code      Consults:   Consults (From admission, onward)        Status Ordering Provider     Inpatient consult to PICC team (NIAS)  Once        Provider:  (Not yet assigned)    Soniya ANDERSON  SANJUANITA     Inpatient consult to PICC team (NIAS)  Once        Provider:  (Not yet assigned)    Completed SANJUANITA ANDERSON     Inpatient consult to Midline team  Once        Provider:  (Not yet assigned)    Completed SANJUANITA ANDERSON     Pharmacy to dose Vancomycin consult  Once        Provider:  (Not yet assigned)   See [place] for full Linked Orders Report.    Acknowledged SANJUANITA ANDERSON     Inpatient consult to Infectious Diseases  Once        Provider:  (Not yet assigned)    Completed SANJUANITA ANDERSON     Inpatient consult to Hematology  Once        Provider:  (Not yet assigned)    Completed RYAN ATKINS     Inpatient consult to Gastroenterology  Once        Provider:  (Not yet assigned)    Completed FARA BARRERA          No new Assessment & Plan notes have been filed under this hospital service since the last note was generated.  Service: Hospital Medicine    Final Active Diagnoses:    Diagnosis Date Noted POA    PRINCIPAL PROBLEM:  Acute necrotizing pancreatitis [K85.91] 09/20/2023 No    Epigastric pain [R10.13] 09/19/2023 Yes    Splenic vein thrombosis [I82.890] 09/20/2023 Yes    Corinne-Chauhan tear [K22.6] 09/18/2023 Yes    HIV infection [B20] 11/02/2021 Yes    Mild intermittent asthma without complication [J45.20] 09/18/2023 Yes    Polycythemia [D75.1] 11/28/2021 Yes    Hepatitis C antibody positive in blood [R76.8] 09/18/2023 Yes    Normocytic anemia [D64.9] 09/18/2023 Yes    Other chest pain [R07.89] 09/22/2023 No    Positive CMV IgG serology [R76.8] 09/21/2023 Yes      Problems Resolved During this Admission:    Diagnosis Date Noted Date Resolved POA    Leukocytosis [D72.829] 09/21/2023 09/22/2023 No    Jaw pain [R68.84] 09/18/2023 09/21/2023 Yes    Bloody diarrhea [R19.7] 09/18/2023 09/21/2023 Yes    Hematemesis [K92.0] 09/17/2023 09/21/2023 Yes       Discharged Condition: stable    Disposition: Home or Self Care    Follow Up:   Follow-up Information     Karen  AAMIR Heller Follow up.    Specialty: Family Medicine  Why: Hospital follow up visit at 12:00pm, please arrive 20 minutes early. Please bring your hospital discharge summary, current medications, insurance card and photo ID  Contact information:  [address]  [place] [address]  [phone]             Infectious Disease Follow up.    Why: A message has been sent to the Infectious Disease clinic, the clinic nurse will contact you to schedule a hospital follow up visit. However, if you do not hear from them within 24 to 48 hours of discharge please call to schedule the appointment.  Contact information:  Infectious Disease   [phone]           Gastroenterology Follow up.    Why: A message has been sent to the Gastroenterology clinic, the clinic nurse will contact you to schedule a hospital follow up visit. However, if you do not hear from them within 24 to 48 hours of discharge please call to schedule the appointment.  Contact information:  Gastroenterology   [phone]                     Patient Instructions:      Ambulatory referral/consult to Internal Medicine   Standing Status: Future   Referral Priority: Routine Referral Type: Consultation   Referral Reason: Specialty Services Required   Requested Specialty: Internal Medicine   Number of Visits Requested: 1     Ambulatory referral/consult to Gastroenterology   Standing Status: Future   Referral Priority: Routine Referral Type: Consultation   Referral Reason: Specialty Services Required   Requested Specialty: Gastroenterology   Number of Visits Requested: 1     Ambulatory referral/consult to Infectious Disease   Standing Status: Future   Referral Priority: Routine Referral Type: Consultation   Referral Reason: Specialty Services Required   Requested Specialty: Infectious Diseases   Number of Visits Requested: 1     Diet Adult Regular     Notify your health care provider if you experience any of the following:  increased confusion or weakness      Notify your health care provider if you experience any of the following:  persistent dizziness, light-headedness, or visual disturbances     Notify your health care provider if you experience any of the following:  worsening rash     Notify your health care provider if you experience any of the following:  severe persistent headache     Notify your health care provider if you experience any of the following:  difficulty breathing or increased cough     Notify your health care provider if you experience any of the following:  severe uncontrolled pain     Notify your health care provider if you experience any of the following:  persistent nausea and vomiting or diarrhea     Notify your health care provider if you experience any of the following:  temperature >100.4     Activity as tolerated       Significant Diagnostic Studies: Labs: All labs within the past 24 hours have been reviewed    Pending Diagnostic Studies:     Procedure Component Value Units Date/Time    Calprotectin, Stool [[id number]] Collected: 09/22/23 1016    Order Status: Sent Lab Status: In process Updated: 09/22/23 1138    Specimen: Stool     Gastrointestinal Pathogens Panel, PCR [[id number]] Collected: 09/22/23 1016    Order Status: Sent Lab Status: In process Updated: 09/22/23 1140    Specimen: Stool     H. pylori antigen, stool [[id number]] Collected: 09/22/23 1016    Order Status: Sent Lab Status: In process Updated: 09/22/23 1141    Specimen: Stool     Phosphatidylethanol (PETH) [[id number]] Collected: 09/25/23 0752    Order Status: Sent Lab Status: In process Updated: 09/25/23 0757    Specimen: Blood     Specimen to Pathology, Surgery Gastrointestinal tract [[id number]] Collected: 09/18/23 1215    Order Status: Sent Lab Status: In process Updated: 09/18/23 1709    Specimen: Tissue     Stool Exam-Ova,Cysts,Parasites [[id number]] Collected: 09/22/23 1016    Order Status: Sent Lab Status: In process Updated: 09/22/23 1143    Specimen: Stool           Medications:  Reconciled Home Medications:      Medication List      START taking these medications    cefdinir 300 MG capsule  Commonly known as: OMNICEF  Take 1 capsule (300 mg total) by mouth 2 (two) times daily. for 7 days     ciprofloxacin HCl 500 MG tablet  Commonly known as: CIPRO  Take 1 tablet (500 mg total) by mouth every 12 (twelve) hours. for 7 days     folic acid 1 MG tablet  Commonly known as: FOLVITE  Take 1 tablet (1 mg total) by mouth once daily.  Start taking on: September 26, 2023     metroNIDAZOLE 500 MG tablet  Commonly known as: FLAGYL  Take 1 tablet (500 mg total) by mouth every 8 (eight) hours. for 7 days     oxyCODONE-acetaminophen  mg per tablet  Commonly known as: PERCOCET  Take 1 tablet by mouth every 4 (four) hours as needed for Pain.     pantoprazole 40 MG tablet  Commonly known as: PROTONIX  Take 1 tablet (40 mg total) by mouth 2 (two) times daily.        CONTINUE taking these medications    BIKTARVY -25 mg (25 kg or greater)  Generic drug: womresvcb-lukabbmv-xkjbhla ala  Take 1 tablet by mouth once daily.     ibuprofen 400 MG tablet  Commonly known as: ADVIL,MOTRIN  Take 400 mg by mouth every 4 (four) hours as needed (Inflammation).        ASK your doctor about these medications    ondansetron 4 MG Tbdl  Commonly known as: ZOFRAN-ODT  Take 1 tablet (4 mg total) by mouth every 6 (six) hours as needed (nausea).            Indwelling Lines/Drains at time of discharge:   Lines/Drains/Airways     None                 Time spent on the discharge of patient: 35 minutes       Jimbo Flor MD  Attending Physician  Medical Director - [facility]  Department of Hospital Medicine  9/25/2023

## 2023-09-25 NOTE — PLAN OF CARE
Problem: Adult Inpatient Plan of Care  Goal: Plan of Care Review  Outcome: Met  Goal: Absence of Hospital-Acquired Illness or Injury  Outcome: Met  Goal: Optimal Comfort and Wellbeing  Outcome: Met  Goal: Readiness for Transition of Care  Outcome: Met     Problem: Adjustment to Illness (Gastrointestinal Bleeding)  Goal: Optimal Coping with Acute Illness  Outcome: Met     Problem: Bleeding (Gastrointestinal Bleeding)  Goal: Hemostasis  Outcome: Met     Problem: Infection  Goal: Absence of Infection Signs and Symptoms  Outcome: Met     Problem: HIV/AIDS Infection  Goal: HIV/AIDS Symptom Control  Outcome: Met     Problem: Anemia  Goal: Anemia Symptom Improvement  Outcome: Met     Problem: Pain Acute  Goal: Acceptable Pain Control and Functional Ability  Outcome: Met     Problem: Bowel Elimination/Continence Impairment  Goal: Effective Bowel Elimination/Continence  Outcome: Met

## 2023-09-25 NOTE — NURSING
Lost PIV access x 3 this shift. Has lost total of 6 PIVs in the last 2 days. Pt states does not want to start another PIV and agrees to have PICC placed today. Oncall hospitalist notified

## 2023-09-27 LAB — CALPROTECTIN STL-MCNT: 190.6 MCG/G

## 2023-09-28 LAB
BACTERIA BLD CULT: NORMAL
BACTERIA BLD CULT: NORMAL
CAMPY SP DNA.DIARRHEA STL QL NAA+PROBE: NOT DETECTED
CLINICAL BIOCHEMIST REVIEW: NORMAL
CRYPTOSP DNA SPEC QL NAA+PROBE: NOT DETECTED
E COLI O157H7 DNA SPEC QL NAA+PROBE: NOT DETECTED
E HISTOLYT DNA SPEC QL NAA+PROBE: NOT DETECTED
G LAMBLIA DNA SPEC QL NAA+PROBE: NOT DETECTED
GPP - ADENOVIRUS 40/41: NOT DETECTED
GPP - ENTEROTOXIGENIC E COLI (ETEC): NOT DETECTED
GPP - SHIGELLA: NOT DETECTED
LACTATE PLASV-SCNC: NOT DETECTED MMOL/L
NOROVIRUS RNA STL QL NAA+PROBE: NOT DETECTED
O+P STL MICRO: NORMAL
PLPETH BLD-MCNC: 265 NG/ML
POPETH BLD-MCNC: 684 NG/ML
RV DSRNA STL QL NAA+PROBE: NOT DETECTED
SALMONELLA DNA SPEC QL NAA+PROBE: NOT DETECTED
V CHOLERAE DNA SPEC QL NAA+PROBE: NOT DETECTED
Y ENTERO RECN STL QL NAA+PROBE: NOT DETECTED

## 2023-09-28 NOTE — PROGRESS NOTES
Tasia, your lab test is consistent with heavy alcohol use. You need to abstain from smoking and drinking totally. Would also recommend you follow up with GI at [facility] or [facility]. Unfortunately, we don't accept your insurance at [facility].     Please contact me if you have any additional concerns.    Sincerely,    Tony Matos

## 2023-09-29 LAB — H PYLORI AG STL QL IA: NOT DETECTED

## 2023-10-05 ENCOUNTER — HOSPITAL ENCOUNTER (INPATIENT)
Facility: HOSPITAL | Age: 23
LOS: 5 days | Discharge: HOME HEALTH | DRG: 439 | End: 2023-10-10
Attending: EMERGENCY MEDICINE | Admitting: HOSPITALIST
Payer: MEDICAID

## 2023-10-05 DIAGNOSIS — K85.91 ACUTE NECROTIZING PANCREATITIS: ICD-10-CM

## 2023-10-05 DIAGNOSIS — R00.0 TACHYCARDIA: ICD-10-CM

## 2023-10-05 DIAGNOSIS — D72.829 LEUKOCYTOSIS, UNSPECIFIED TYPE: ICD-10-CM

## 2023-10-05 DIAGNOSIS — Z21 ASYMPTOMATIC HIV INFECTION, WITH NO HISTORY OF HIV-RELATED ILLNESS: ICD-10-CM

## 2023-10-05 DIAGNOSIS — R10.9 ABDOMINAL PAIN: ICD-10-CM

## 2023-10-05 DIAGNOSIS — K86.3 PANCREATIC PSEUDOCYST: Primary | ICD-10-CM

## 2023-10-05 PROBLEM — K85.90 PANCREATIC ABSCESS: Status: ACTIVE | Noted: 2023-09-30

## 2023-10-05 PROBLEM — F10.90 ALCOHOL USE DISORDER: Status: ACTIVE | Noted: 2023-10-05

## 2023-10-05 LAB
ALBUMIN SERPL BCP-MCNC: 3.4 G/DL (ref 3.5–5.2)
ALP SERPL-CCNC: 97 U/L (ref 55–135)
ALT SERPL W/O P-5'-P-CCNC: 11 U/L (ref 10–44)
ANION GAP SERPL CALC-SCNC: 14 MMOL/L (ref 8–16)
AST SERPL-CCNC: 24 U/L (ref 10–40)
BASOPHILS # BLD AUTO: 0.1 K/UL (ref 0–0.2)
BASOPHILS NFR BLD: 0.4 % (ref 0–1.9)
BILIRUB SERPL-MCNC: 0.4 MG/DL (ref 0.1–1)
BUN SERPL-MCNC: 8 MG/DL (ref 6–20)
CALCIUM SERPL-MCNC: 9.8 MG/DL (ref 8.7–10.5)
CHLORIDE SERPL-SCNC: 100 MMOL/L (ref 95–110)
CO2 SERPL-SCNC: 22 MMOL/L (ref 23–29)
CREAT SERPL-MCNC: 0.8 MG/DL (ref 0.5–1.4)
DIFFERENTIAL METHOD: ABNORMAL
EOSINOPHIL # BLD AUTO: 0.2 K/UL (ref 0–0.5)
EOSINOPHIL NFR BLD: 1 % (ref 0–8)
ERYTHROCYTE [DISTWIDTH] IN BLOOD BY AUTOMATED COUNT: 19.5 % (ref 11.5–14.5)
EST. GFR  (NO RACE VARIABLE): >60 ML/MIN/1.73 M^2
GLUCOSE SERPL-MCNC: 157 MG/DL (ref 70–110)
HCT VFR BLD AUTO: 37.9 % (ref 40–54)
HGB BLD-MCNC: 11.9 G/DL (ref 14–18)
IMM GRANULOCYTES # BLD AUTO: 0.4 K/UL (ref 0–0.04)
IMM GRANULOCYTES NFR BLD AUTO: 1.7 % (ref 0–0.5)
LACTATE SERPL-SCNC: 0.6 MMOL/L (ref 0.5–2.2)
LIPASE SERPL-CCNC: 76 U/L (ref 4–60)
LYMPHOCYTES # BLD AUTO: 2.3 K/UL (ref 1–4.8)
LYMPHOCYTES NFR BLD: 9.8 % (ref 18–48)
MCH RBC QN AUTO: 29.1 PG (ref 27–31)
MCHC RBC AUTO-ENTMCNC: 31.4 G/DL (ref 32–36)
MCV RBC AUTO: 93 FL (ref 82–98)
MONOCYTES # BLD AUTO: 1.4 K/UL (ref 0.3–1)
MONOCYTES NFR BLD: 5.7 % (ref 4–15)
NEUTROPHILS # BLD AUTO: 19.4 K/UL (ref 1.8–7.7)
NEUTROPHILS NFR BLD: 81.4 % (ref 38–73)
NRBC BLD-RTO: 0 /100 WBC
PLATELET # BLD AUTO: 761 K/UL (ref 150–450)
PMV BLD AUTO: 9.2 FL (ref 9.2–12.9)
POTASSIUM SERPL-SCNC: 3.6 MMOL/L (ref 3.5–5.1)
PROT SERPL-MCNC: 8.4 G/DL (ref 6–8.4)
RBC # BLD AUTO: 4.09 M/UL (ref 4.6–6.2)
SODIUM SERPL-SCNC: 136 MMOL/L (ref 136–145)
WBC # BLD AUTO: 23.83 K/UL (ref 3.9–12.7)

## 2023-10-05 PROCEDURE — 25000003 PHARM REV CODE 250: Performed by: EMERGENCY MEDICINE

## 2023-10-05 PROCEDURE — 93005 ELECTROCARDIOGRAM TRACING: CPT

## 2023-10-05 PROCEDURE — 93010 ELECTROCARDIOGRAM REPORT: CPT | Mod: ,,, | Performed by: INTERNAL MEDICINE

## 2023-10-05 PROCEDURE — 96361 HYDRATE IV INFUSION ADD-ON: CPT

## 2023-10-05 PROCEDURE — 96375 TX/PRO/DX INJ NEW DRUG ADDON: CPT

## 2023-10-05 PROCEDURE — 99223 PR INITIAL HOSPITAL CARE,LEVL III: ICD-10-PCS | Mod: ,,, | Performed by: HOSPITALIST

## 2023-10-05 PROCEDURE — 96365 THER/PROPH/DIAG IV INF INIT: CPT

## 2023-10-05 PROCEDURE — 25000003 PHARM REV CODE 250: Performed by: PHYSICIAN ASSISTANT

## 2023-10-05 PROCEDURE — 85025 COMPLETE CBC W/AUTO DIFF WBC: CPT | Performed by: PHYSICIAN ASSISTANT

## 2023-10-05 PROCEDURE — 99223 1ST HOSP IP/OBS HIGH 75: CPT | Mod: ,,, | Performed by: HOSPITALIST

## 2023-10-05 PROCEDURE — 25500020 PHARM REV CODE 255: Performed by: EMERGENCY MEDICINE

## 2023-10-05 PROCEDURE — 20600001 HC STEP DOWN PRIVATE ROOM

## 2023-10-05 PROCEDURE — 63600175 PHARM REV CODE 636 W HCPCS: Performed by: PHYSICIAN ASSISTANT

## 2023-10-05 PROCEDURE — 83690 ASSAY OF LIPASE: CPT | Performed by: PHYSICIAN ASSISTANT

## 2023-10-05 PROCEDURE — 63600175 PHARM REV CODE 636 W HCPCS: Performed by: HOSPITALIST

## 2023-10-05 PROCEDURE — 83605 ASSAY OF LACTIC ACID: CPT | Performed by: HOSPITALIST

## 2023-10-05 PROCEDURE — 96376 TX/PRO/DX INJ SAME DRUG ADON: CPT

## 2023-10-05 PROCEDURE — 99285 EMERGENCY DEPT VISIT HI MDM: CPT | Mod: 25

## 2023-10-05 PROCEDURE — 36415 COLL VENOUS BLD VENIPUNCTURE: CPT | Performed by: HOSPITALIST

## 2023-10-05 PROCEDURE — 25000003 PHARM REV CODE 250: Performed by: HOSPITALIST

## 2023-10-05 PROCEDURE — 80053 COMPREHEN METABOLIC PANEL: CPT | Performed by: PHYSICIAN ASSISTANT

## 2023-10-05 PROCEDURE — 93010 EKG 12-LEAD: ICD-10-PCS | Mod: ,,, | Performed by: INTERNAL MEDICINE

## 2023-10-05 RX ORDER — METRONIDAZOLE 500 MG/100ML
500 INJECTION, SOLUTION INTRAVENOUS
Status: DISCONTINUED | OUTPATIENT
Start: 2023-10-05 | End: 2023-10-05

## 2023-10-05 RX ORDER — KETAMINE HYDROCHLORIDE 50 MG/ML
20 INJECTION, SOLUTION INTRAMUSCULAR; INTRAVENOUS
Status: COMPLETED | OUTPATIENT
Start: 2023-10-05 | End: 2023-10-05

## 2023-10-05 RX ORDER — HYDROMORPHONE HYDROCHLORIDE 1 MG/ML
1 INJECTION, SOLUTION INTRAMUSCULAR; INTRAVENOUS; SUBCUTANEOUS EVERY 6 HOURS PRN
Status: DISCONTINUED | OUTPATIENT
Start: 2023-10-05 | End: 2023-10-05

## 2023-10-05 RX ORDER — HYDROMORPHONE HYDROCHLORIDE 1 MG/ML
1 INJECTION, SOLUTION INTRAMUSCULAR; INTRAVENOUS; SUBCUTANEOUS EVERY 4 HOURS PRN
Status: DISCONTINUED | OUTPATIENT
Start: 2023-10-06 | End: 2023-10-06

## 2023-10-05 RX ORDER — HYDROMORPHONE HYDROCHLORIDE 1 MG/ML
2 INJECTION, SOLUTION INTRAMUSCULAR; INTRAVENOUS; SUBCUTANEOUS ONCE
Status: DISCONTINUED | OUTPATIENT
Start: 2023-10-06 | End: 2023-10-06

## 2023-10-05 RX ORDER — HYDROMORPHONE HYDROCHLORIDE 1 MG/ML
0.5 INJECTION, SOLUTION INTRAMUSCULAR; INTRAVENOUS; SUBCUTANEOUS
Status: COMPLETED | OUTPATIENT
Start: 2023-10-05 | End: 2023-10-05

## 2023-10-05 RX ORDER — IBUPROFEN 200 MG
1 TABLET ORAL DAILY
Status: ON HOLD | COMMUNITY
Start: 2023-10-04 | End: 2023-10-10 | Stop reason: HOSPADM

## 2023-10-05 RX ORDER — MORPHINE SULFATE 4 MG/ML
4 INJECTION, SOLUTION INTRAMUSCULAR; INTRAVENOUS
Status: COMPLETED | OUTPATIENT
Start: 2023-10-05 | End: 2023-10-05

## 2023-10-05 RX ORDER — CIPROFLOXACIN 500 MG/1
500 TABLET ORAL EVERY 12 HOURS
Status: ON HOLD | COMMUNITY
Start: 2023-10-04 | End: 2023-10-10 | Stop reason: HOSPADM

## 2023-10-05 RX ORDER — METRONIDAZOLE 500 MG/1
500 TABLET ORAL EVERY 8 HOURS
Status: ON HOLD | COMMUNITY
Start: 2023-10-04 | End: 2023-10-10 | Stop reason: HOSPADM

## 2023-10-05 RX ORDER — PROMETHAZINE HYDROCHLORIDE 25 MG/1
25 TABLET ORAL EVERY 6 HOURS PRN
Status: ON HOLD | COMMUNITY
Start: 2023-10-04 | End: 2023-10-10 | Stop reason: HOSPADM

## 2023-10-05 RX ORDER — ONDANSETRON 2 MG/ML
4 INJECTION INTRAMUSCULAR; INTRAVENOUS
Status: COMPLETED | OUTPATIENT
Start: 2023-10-05 | End: 2023-10-05

## 2023-10-05 RX ORDER — DROPERIDOL 2.5 MG/ML
1.25 INJECTION, SOLUTION INTRAMUSCULAR; INTRAVENOUS
Status: COMPLETED | OUTPATIENT
Start: 2023-10-05 | End: 2023-10-05

## 2023-10-05 RX ORDER — OXYCODONE HYDROCHLORIDE 5 MG/1
5 TABLET ORAL EVERY 6 HOURS PRN
Status: ON HOLD | COMMUNITY
Start: 2023-10-04 | End: 2023-10-10 | Stop reason: HOSPADM

## 2023-10-05 RX ADMIN — IOHEXOL 75 ML: 350 INJECTION, SOLUTION INTRAVENOUS at 04:10

## 2023-10-05 RX ADMIN — METRONIDAZOLE 500 MG: 5 INJECTION, SOLUTION INTRAVENOUS at 06:10

## 2023-10-05 RX ADMIN — ONDANSETRON 4 MG: 2 INJECTION INTRAMUSCULAR; INTRAVENOUS at 03:10

## 2023-10-05 RX ADMIN — MORPHINE SULFATE 4 MG: 4 INJECTION INTRAVENOUS at 03:10

## 2023-10-05 RX ADMIN — SODIUM CHLORIDE, POTASSIUM CHLORIDE, SODIUM LACTATE AND CALCIUM CHLORIDE 1000 ML: 600; 310; 30; 20 INJECTION, SOLUTION INTRAVENOUS at 03:10

## 2023-10-05 RX ADMIN — SODIUM CHLORIDE 1000 ML: 9 INJECTION, SOLUTION INTRAVENOUS at 06:10

## 2023-10-05 RX ADMIN — DROPERIDOL 1.25 MG: 2.5 INJECTION, SOLUTION INTRAMUSCULAR; INTRAVENOUS at 06:10

## 2023-10-05 RX ADMIN — KETAMINE HYDROCHLORIDE 20 MG: 50 INJECTION INTRAMUSCULAR; INTRAVENOUS at 07:10

## 2023-10-05 RX ADMIN — MORPHINE SULFATE 4 MG: 4 INJECTION INTRAVENOUS at 04:10

## 2023-10-05 RX ADMIN — MEROPENEM 2 G: 1 INJECTION INTRAVENOUS at 10:10

## 2023-10-05 RX ADMIN — HYDROMORPHONE HYDROCHLORIDE 0.5 MG: 1 INJECTION, SOLUTION INTRAMUSCULAR; INTRAVENOUS; SUBCUTANEOUS at 04:10

## 2023-10-05 RX ADMIN — HYDROMORPHONE HYDROCHLORIDE 1 MG: 1 INJECTION, SOLUTION INTRAMUSCULAR; INTRAVENOUS; SUBCUTANEOUS at 10:10

## 2023-10-05 NOTE — FIRST PROVIDER EVALUATION
Emergency Department TeleTriage Encounter Note      CHIEF COMPLAINT    Chief Complaint   Patient presents with    Abdominal Pain    Vomiting     Hx pancreatitis,  actively vomiting, diaphoretic       VITAL SIGNS   Initial Vitals [10/05/23 1404]   BP Pulse Resp Temp SpO2   138/79 (!) 133 18 -- --      MAP       --            ALLERGIES    Review of patient's allergies indicates:   Allergen Reactions    [place] Swelling    Pcn [penicillins] Hives    Pecan nut Swelling    Soy     Sulfa (sulfonamide antibiotics) Hives       PROVIDER TRIAGE NOTE  Patient with history of necrotizing pancreatitis, HIV and other comorbidities presenting with severe epigastric pain, nausea and vomiting. Denies melena or hematochezia.       ORDERS  Labs Reviewed - No data to display    ED Orders (720h ago, onward)      Start Ordered     Status Ordering Provider    10/05/23 1409 10/05/23 1409  EKG 12-lead  Once         Final result NISSA CONNORS              Virtual Visit Note: The provider triage portion of this emergency department evaluation and documentation was performed via [employer], a HIPAA-compliant telemedicine application, in concert with a tele-presenter in the room. A face to face patient evaluation with one of my colleagues will occur once the patient is placed in an emergency department room.      DISCLAIMER: This note was prepared with [employer] voice recognition transcription software. Garbled syntax, mangled pronouns, and other bizarre constructions may be attributed to that software system.

## 2023-10-05 NOTE — EKG INTERPRETATIONS - EMERGENCY DEPT.
Independently interpreted by MD:  Rate 141, NSR, no stemi, no ectopy, no hypertrophy, sinus tachycardia, poor qrs progression in V3-4

## 2023-10-05 NOTE — Clinical Note
Diagnosis: Abdominal pain [075798]   Admitting Provider:: ERIN BARRERA [[id number]]   Future Attending Provider: ERIN BARRERA [[id number]]   Reason for IP Medical Treatment  (Clinical interventions that can only be accomplished in the IP setting? ) :: intraabdominal abscess   I certify that Inpatient services for greater than or equal to 2 midnights are medically necessary:: Yes   Plans for Post-Acute care--if anticipated (pick the single best option):: A. No post acute care anticipated at this time

## 2023-10-05 NOTE — ED PROVIDER NOTES
Encounter Date: 10/5/2023       History     Chief Complaint   Patient presents with    Abdominal Pain    Vomiting     Hx pancreatitis,  actively vomiting, diaphoretic     23 y.o. male with a PMHx of asthma, brandy hitchcock, necrotizing pancreatitis, splenic vein thrombosis presents to the ED with  right sided abdominal pain x2 hours. He has associated nausea and non-bloody vomiting. Last BM was this morning. No recent alcohol use. Denies melena, hematochezia, fever, chest pain, shortness of breath.    The history is provided by the patient.     Review of patient's allergies indicates:   Allergen Reactions    [place] Swelling    Pcn [penicillins] Hives    Pecan nut Swelling    Soy     Sulfa (sulfonamide antibiotics) Hives     Past Medical History:   Diagnosis Date    Asthma      Past Surgical History:   Procedure Laterality Date    ESOPHAGOGASTRODUODENOSCOPY N/A 9/18/2023    Procedure: EGD (ESOPHAGOGASTRODUODENOSCOPY);  Surgeon: Kg Cleaning MD;  Location: [address]);  Service: Endoscopy;  Laterality: N/A;     Family History   Problem Relation Age of Onset    Pancreatitis Mother     Cancer Mother     Ovarian cancer Mother     No Known Problems Father     Cancer Brother     Breast cancer Maternal Grandmother      Social History     Tobacco Use    Smoking status: Some Days     Types: Cigarettes    Smokeless tobacco: Never   Substance Use Topics    Alcohol use: Yes    Drug use: Never     Review of Systems   Constitutional:  Negative for chills and fever.   Respiratory:  Negative for shortness of breath.    Cardiovascular:  Negative for chest pain.   Gastrointestinal:  Positive for abdominal pain, nausea and vomiting. Negative for blood in stool, constipation and diarrhea.       Physical Exam     Initial Vitals   BP Pulse Resp Temp SpO2   10/05/23 1404 10/05/23 1404 10/05/23 1404 10/05/23 1556 10/05/23 1619   138/79 (!) 133 18 98 °F (36.7 °C) 100 %      MAP       --                Physical Exam    Nursing note  and vitals reviewed.  Constitutional: He appears well-developed and well-nourished.   Appears in pain    HENT:   Head: Normocephalic and atraumatic.   Nose: Nose normal.   Eyes: Conjunctivae and EOM are normal.   Neck: Neck supple.   Cardiovascular:  Normal rate, regular rhythm, normal heart sounds and intact distal pulses.           Pulmonary/Chest: Breath sounds normal. No respiratory distress.   Abdominal: There is generalized abdominal tenderness. There is guarding.   Musculoskeletal:      Cervical back: Neck supple.     Neurological: He is alert and oriented to person, place, and time.   Skin: No rash noted.   Psychiatric: He has a normal mood and affect. His behavior is normal. Judgment and thought content normal.         ED Course   Procedures  Labs Reviewed   CBC W/ AUTO DIFFERENTIAL - Abnormal; Notable for the following components:       Result Value    WBC 23.83 (*)     RBC 4.09 (*)     Hemoglobin 11.9 (*)     Hematocrit 37.9 (*)     MCHC 31.4 (*)     RDW 19.5 (*)     Platelets 761 (*)     Immature Granulocytes 1.7 (*)     Gran # (ANC) 19.4 (*)     Immature Grans (Abs) 0.40 (*)     Mono # 1.4 (*)     Gran % 81.4 (*)     Lymph % 9.8 (*)     All other components within normal limits   COMPREHENSIVE METABOLIC PANEL - Abnormal; Notable for the following components:    CO2 22 (*)     Glucose 157 (*)     Albumin 3.4 (*)     All other components within normal limits   LIPASE - Abnormal; Notable for the following components:    Lipase 76 (*)     All other components within normal limits   LACTIC ACID, PLASMA   URINALYSIS, REFLEX TO URINE CULTURE     EKG Readings: (Independently Interpreted)   Initial Reading: No STEMI. Rhythm: Sinus Tachycardia. Heart Rate: 141. Ectopy: No Ectopy. Axis: Right Axis Deviation. Clinical Impression: Sinus Tachycardia     ECG Results              EKG 12-lead (Final result)  Result time 10/05/23 14:48:39      Final result by Interface, Lab In [facility] (10/05/23 14:48:39)                    Narrative:    Test Reason : R10.9,    Vent. Rate : 141 BPM     Atrial Rate : 141 BPM     P-R Int : 122 ms          QRS Dur : 090 ms      QT Int : 338 ms       P-R-T Axes : 000 190 123 degrees     QTc Int : 517 ms    Sinus tachycardia  Right superior axis deviation  Abnormal ECG  When compared with ECG of 19-SEP-2023 11:01,  Vent. rate has increased BY  91 BPM  T wave amplitude has increased in Anterior leads  T wave inversion now evident in Lateral leads  Confirmed by Neva Farris MD ([id number]) on 10/5/2023 2:48:33 PM    Referred By:             Confirmed By:Neva Farris MD                                  Imaging Results               CT Abdomen Pelvis With Contrast (Final result)  Result time 10/05/23 17:44:03      Final result by Juan Sexton MD (10/05/23 17:44:03)                   Impression:      Interval development of multilobulated peripancreatic fluid collections adjacent to the pancreatic tail and body is detailed above.  These collections appear inter communicate with one another.  Findings suggests pseudo cysts, no air to suggest infected collection though infected content is not excluded.  Correlation and follow-up is advised.    Small left pleural effusion, similar to CT 09/20/2023.    Continued thrombosis of the splenic vein noting perigastric collaterals.    Geographic hepatic steatosis, correlation with LFTs recommended.    Please see above for several additional findings.    This report was flagged in Epic as abnormal.    Electronically signed by resident: Sally Mcguire  Date:    10/05/2023  Time:    17:08    Electronically signed by: Juan Sexton MD  Date:    10/05/2023  Time:    17:44               Narrative:    EXAMINATION:  CT ABDOMEN PELVIS WITH CONTRAST    CLINICAL HISTORY:  Nausea/vomiting;Abdominal pain, acute, nonlocalized;    TECHNIQUE:  Axial images of the abdomen and pelvis were acquired after the use of 75 coronal and sagittal reconstructions were also  obtained    COMPARISON:  CT 09/22/2023, 09/18/2023, 12/07/2021    FINDINGS:  Images of the lower thorax are remarkable for a small left pleural effusion with associated compressive atelectasis of the left lower lobe.    Heart: No pericardial effusion.    Esophagus: Distal esophagus is patulous.    Stomach and duodenum: Linear focus of hyperdense material within the stomach, which may be related to ingested material.    Liver: Hepatomegaly measuring 22 cm in craniocaudal dimension.  Hepatic steatosis with geographic region of fat deposition..    Gallbladder: No calcified gallstones.    Bile Ducts: No evidence of dilated ducts.    Spleen: Splenomegaly.    Pancreas: Interval development of peripancreatic fluid collections about the tail and body, which may be contiguous with one another.  The largest inferior component of this collection extends anteriorly and measures 7.4 x 6.5 cm (2-76) and the largest superior component measures 6.5 x 3.3 cm (2-43).  These collections appear partially encapsulated and suggest pseudocysts.    Adrenals: Unremarkable.    Kidneys/Ureters: The kidneys enhance symmetrically without hydronephrosis or nephrolithiasis.  The bilateral ureters are unremarkable without calculi seen.  The urinary bladder is nondistended noting there may be residual wall thickening.    Reproductive organs: The prostate and scrotal content are unremarkable.    Bowel/Mesentery: There are several scattered perihepatic varices.  No evidence of inflammation or wall thickening.  Colon demonstrates no focal wall thickening.  Appendix is unremarkable.    Peritoneum: Scattered fluid through the pelvis, bilateral pericolic gutters, and right upper quadrant adjacent to the liver, which is decreased in volume compared to prior CT.  There is no free air.    Lymph nodes: Prominent lymph nodes through the upper abdomen and retroperitoneum, which may be reactive.    Abdominal wall:  There is bilateral  gynecomastia.    Vasculature: The portal vein, proximal splenic vein, SMV, celiac axis and SMA all are patent.  The splenic vein is compressed/thrombosed, similar in appearance to the previous examination noting several gastric and mesenteric collaterals.    Bones: No acute fracture.                                       Medications   ceFEPIme (MAXIPIME) 2 g in dextrose 5 % in water (D5W) 100 mL IVPB (MB+) (has no administration in time range)   metronidazole IVPB 500 mg (has no administration in time range)   sodium chloride 0.9% bolus 1,000 mL 1,000 mL (has no administration in time range)   ondansetron injection 4 mg (4 mg Intravenous Given 10/5/23 1520)   lactated ringers bolus 1,000 mL (0 mLs Intravenous Stopped 10/5/23 1629)   morphine injection 4 mg (4 mg Intravenous Given 10/5/23 1520)   morphine injection 4 mg (4 mg Intravenous Given 10/5/23 1602)   HYDROmorphone injection 0.5 mg (0.5 mg Intravenous Given 10/5/23 1656)   iohexoL (OMNIPAQUE 350) injection 75 mL (75 mLs Intravenous Given 10/5/23 1655)   droPERidol injection 1.25 mg (1.25 mg Intravenous Given 10/5/23 1805)     Medical Decision Making  23 y.o. male with PMHx of asthma, brandy hitchcock, necrotizing pancreatitis, splenic vein thrombosis presents to the ED abdominal pain x2 hours.  Appears in pain.  Vitals with tachycardia.  Exam as above. I will initiate workup and reassess.    Ddx:  Necrotizing pancreatitis, acute GI bleed, gastrointestinal perforation, gastroenteritis, cholecystitis, gastritis/PUD, appendicitis      Leukocytosis at 23.83. Hemoglobin stable at 11.9.  I do not suspect biliary origin as T bili, alk-phos, LFTs are within normal limits.  Lipase mildly elevated though has down trended (332>76). CT AP with intraabdominal abscess vs pseudocyst. Started on cefepime and flagyl. Pain uncontrolled after 8 mg of morphine. IV dilaudid and droperidol given. Will plan to admit to  at this time for further management.     Amount and/or  Complexity of Data Reviewed  Labs:  Decision-making details documented in ED Course.  Radiology: ordered.    Risk  Prescription drug management.  Decision regarding hospitalization.               ED Course as of 10/05/23 1813   u Oct 05, 2023   1622 WBC(!): 23.83 [HM]   1622 Lipase(!): 76 [HM]   1622 Hemoglobin(!): 11.9  stable [HM]      ED Course User Index  [HM] Remedios Russ PA-C                    Clinical Impression:   Final diagnoses:  [R10.9] Abdominal pain  [K86.3] Pancreatic pseudocyst (Primary)  [D72.829] Leukocytosis, unspecified type  [R00.0] Tachycardia        ED Disposition Condition    Admit                 Remedios Russ PA-C  10/05/23 1813       Remedios Russ PA-C  10/05/23 2006

## 2023-10-06 ENCOUNTER — PATIENT MESSAGE (OUTPATIENT)
Dept: HEMATOLOGY/ONCOLOGY | Facility: CLINIC | Age: 23
End: 2023-10-06
Payer: MEDICAID

## 2023-10-06 PROBLEM — J45.20 MILD INTERMITTENT ASTHMA WITHOUT COMPLICATION: Chronic | Status: ACTIVE | Noted: 2023-09-18

## 2023-10-06 PROBLEM — D64.9 NORMOCYTIC ANEMIA: Chronic | Status: ACTIVE | Noted: 2023-09-18

## 2023-10-06 PROBLEM — R55 SYNCOPE: Status: RESOLVED | Noted: 2022-12-17 | Resolved: 2023-10-06

## 2023-10-06 PROBLEM — R76.8 POSITIVE CMV IGG SEROLOGY: Chronic | Status: ACTIVE | Noted: 2023-09-21

## 2023-10-06 PROBLEM — R11.2 NAUSEA & VOMITING: Status: RESOLVED | Noted: 2022-12-17 | Resolved: 2023-10-06

## 2023-10-06 LAB
ALBUMIN SERPL BCP-MCNC: 2.7 G/DL (ref 3.5–5.2)
ALP SERPL-CCNC: 72 U/L (ref 55–135)
ALT SERPL W/O P-5'-P-CCNC: 6 U/L (ref 10–44)
ANION GAP SERPL CALC-SCNC: 9 MMOL/L (ref 8–16)
APTT PPP: 27.9 SEC (ref 21–32)
AST SERPL-CCNC: 14 U/L (ref 10–40)
BASOPHILS # BLD AUTO: 0.06 K/UL (ref 0–0.2)
BASOPHILS NFR BLD: 0.5 % (ref 0–1.9)
BILIRUB DIRECT SERPL-MCNC: 0.2 MG/DL (ref 0.1–0.3)
BILIRUB SERPL-MCNC: 0.4 MG/DL (ref 0.1–1)
BILIRUB UR QL STRIP: NEGATIVE
BUN SERPL-MCNC: 7 MG/DL (ref 6–20)
CALCIUM SERPL-MCNC: 8.9 MG/DL (ref 8.7–10.5)
CHLORIDE SERPL-SCNC: 103 MMOL/L (ref 95–110)
CLARITY UR REFRACT.AUTO: CLEAR
CO2 SERPL-SCNC: 24 MMOL/L (ref 23–29)
COLOR UR AUTO: ABNORMAL
CREAT SERPL-MCNC: 0.6 MG/DL (ref 0.5–1.4)
DIFFERENTIAL METHOD: ABNORMAL
EOSINOPHIL # BLD AUTO: 0.3 K/UL (ref 0–0.5)
EOSINOPHIL NFR BLD: 2.2 % (ref 0–8)
ERYTHROCYTE [DISTWIDTH] IN BLOOD BY AUTOMATED COUNT: 20 % (ref 11.5–14.5)
EST. GFR  (NO RACE VARIABLE): >60 ML/MIN/1.73 M^2
ESTIMATED AVG GLUCOSE: 100 MG/DL (ref 68–131)
GGT SERPL-CCNC: 61 U/L (ref 8–55)
GLUCOSE SERPL-MCNC: 91 MG/DL (ref 70–110)
GLUCOSE UR QL STRIP: NEGATIVE
HBA1C MFR BLD: 5.1 % (ref 4–5.6)
HCT VFR BLD AUTO: 31.7 % (ref 40–54)
HGB BLD-MCNC: 10 G/DL (ref 14–18)
HGB UR QL STRIP: NEGATIVE
IMM GRANULOCYTES # BLD AUTO: 0.15 K/UL (ref 0–0.04)
IMM GRANULOCYTES NFR BLD AUTO: 1.2 % (ref 0–0.5)
INR PPP: 1.1 (ref 0.8–1.2)
KETONES UR QL STRIP: ABNORMAL
LACTATE SERPL-SCNC: 0.7 MMOL/L (ref 0.5–2.2)
LEUKOCYTE ESTERASE UR QL STRIP: NEGATIVE
LYMPHOCYTES # BLD AUTO: 2.2 K/UL (ref 1–4.8)
LYMPHOCYTES NFR BLD: 17.5 % (ref 18–48)
MAGNESIUM SERPL-MCNC: 1.7 MG/DL (ref 1.6–2.6)
MCH RBC QN AUTO: 29.4 PG (ref 27–31)
MCHC RBC AUTO-ENTMCNC: 31.5 G/DL (ref 32–36)
MCV RBC AUTO: 93 FL (ref 82–98)
MONOCYTES # BLD AUTO: 1.2 K/UL (ref 0.3–1)
MONOCYTES NFR BLD: 9.3 % (ref 4–15)
NEUTROPHILS # BLD AUTO: 8.8 K/UL (ref 1.8–7.7)
NEUTROPHILS NFR BLD: 69.3 % (ref 38–73)
NITRITE UR QL STRIP: NEGATIVE
NRBC BLD-RTO: 0 /100 WBC
OSMOLALITY SERPL: 288 MOSM/KG (ref 280–300)
PH UR STRIP: 6 [PH] (ref 5–8)
PHOSPHATE SERPL-MCNC: 4.1 MG/DL (ref 2.7–4.5)
PLATELET # BLD AUTO: 495 K/UL (ref 150–450)
PMV BLD AUTO: 9.8 FL (ref 9.2–12.9)
POCT GLUCOSE: 89 MG/DL (ref 70–110)
POCT GLUCOSE: 90 MG/DL (ref 70–110)
POTASSIUM SERPL-SCNC: 3.3 MMOL/L (ref 3.5–5.1)
PROT SERPL-MCNC: 6.6 G/DL (ref 6–8.4)
PROT UR QL STRIP: NEGATIVE
PROTHROMBIN TIME: 12.2 SEC (ref 9–12.5)
RBC # BLD AUTO: 3.4 M/UL (ref 4.6–6.2)
SODIUM SERPL-SCNC: 136 MMOL/L (ref 136–145)
SP GR UR STRIP: 1.01 (ref 1–1.03)
URN SPEC COLLECT METH UR: ABNORMAL
WBC # BLD AUTO: 12.72 K/UL (ref 3.9–12.7)

## 2023-10-06 PROCEDURE — 99233 SBSQ HOSP IP/OBS HIGH 50: CPT | Mod: ,,, | Performed by: HOSPITALIST

## 2023-10-06 PROCEDURE — 63600175 PHARM REV CODE 636 W HCPCS: Performed by: HOSPITALIST

## 2023-10-06 PROCEDURE — 80053 COMPREHEN METABOLIC PANEL: CPT | Performed by: HOSPITALIST

## 2023-10-06 PROCEDURE — 99223 1ST HOSP IP/OBS HIGH 75: CPT | Mod: ,,, | Performed by: INTERNAL MEDICINE

## 2023-10-06 PROCEDURE — 99223 PR INITIAL HOSPITAL CARE,LEVL III: ICD-10-PCS | Mod: ,,, | Performed by: INTERNAL MEDICINE

## 2023-10-06 PROCEDURE — 83735 ASSAY OF MAGNESIUM: CPT | Performed by: HOSPITALIST

## 2023-10-06 PROCEDURE — 94761 N-INVAS EAR/PLS OXIMETRY MLT: CPT

## 2023-10-06 PROCEDURE — 83930 ASSAY OF BLOOD OSMOLALITY: CPT | Performed by: HOSPITALIST

## 2023-10-06 PROCEDURE — 82248 BILIRUBIN DIRECT: CPT | Performed by: HOSPITALIST

## 2023-10-06 PROCEDURE — 85730 THROMBOPLASTIN TIME PARTIAL: CPT | Performed by: HOSPITALIST

## 2023-10-06 PROCEDURE — 83036 HEMOGLOBIN GLYCOSYLATED A1C: CPT | Performed by: HOSPITALIST

## 2023-10-06 PROCEDURE — 25000003 PHARM REV CODE 250: Performed by: HOSPITALIST

## 2023-10-06 PROCEDURE — 82977 ASSAY OF GGT: CPT | Performed by: HOSPITALIST

## 2023-10-06 PROCEDURE — 85025 COMPLETE CBC W/AUTO DIFF WBC: CPT | Performed by: HOSPITALIST

## 2023-10-06 PROCEDURE — 20600001 HC STEP DOWN PRIVATE ROOM

## 2023-10-06 PROCEDURE — 85610 PROTHROMBIN TIME: CPT | Performed by: HOSPITALIST

## 2023-10-06 PROCEDURE — 99233 PR SUBSEQUENT HOSPITAL CARE,LEVL III: ICD-10-PCS | Mod: ,,, | Performed by: HOSPITALIST

## 2023-10-06 PROCEDURE — 36415 COLL VENOUS BLD VENIPUNCTURE: CPT | Performed by: HOSPITALIST

## 2023-10-06 PROCEDURE — 81003 URINALYSIS AUTO W/O SCOPE: CPT | Performed by: PHYSICIAN ASSISTANT

## 2023-10-06 PROCEDURE — 84100 ASSAY OF PHOSPHORUS: CPT | Performed by: HOSPITALIST

## 2023-10-06 PROCEDURE — 83605 ASSAY OF LACTIC ACID: CPT | Performed by: HOSPITALIST

## 2023-10-06 RX ORDER — PANTOPRAZOLE SODIUM 40 MG/1
40 TABLET, DELAYED RELEASE ORAL 2 TIMES DAILY
Status: DISCONTINUED | OUTPATIENT
Start: 2023-10-06 | End: 2023-10-10 | Stop reason: HOSPADM

## 2023-10-06 RX ORDER — AMOXICILLIN 250 MG
1 CAPSULE ORAL 2 TIMES DAILY
Status: DISCONTINUED | OUTPATIENT
Start: 2023-10-06 | End: 2023-10-10 | Stop reason: HOSPADM

## 2023-10-06 RX ORDER — HYDROMORPHONE HYDROCHLORIDE 1 MG/ML
1 INJECTION, SOLUTION INTRAMUSCULAR; INTRAVENOUS; SUBCUTANEOUS EVERY 4 HOURS PRN
Status: DISCONTINUED | OUTPATIENT
Start: 2023-10-06 | End: 2023-10-06

## 2023-10-06 RX ORDER — KETOROLAC TROMETHAMINE 30 MG/ML
15 INJECTION, SOLUTION INTRAMUSCULAR; INTRAVENOUS EVERY 6 HOURS PRN
Status: DISPENSED | OUTPATIENT
Start: 2023-10-06 | End: 2023-10-09

## 2023-10-06 RX ORDER — INSULIN ASPART 100 [IU]/ML
0-5 INJECTION, SOLUTION INTRAVENOUS; SUBCUTANEOUS EVERY 6 HOURS PRN
Status: DISCONTINUED | OUTPATIENT
Start: 2023-10-06 | End: 2023-10-10 | Stop reason: HOSPADM

## 2023-10-06 RX ORDER — PROCHLORPERAZINE EDISYLATE 5 MG/ML
5 INJECTION INTRAMUSCULAR; INTRAVENOUS EVERY 6 HOURS PRN
Status: DISCONTINUED | OUTPATIENT
Start: 2023-10-06 | End: 2023-10-10 | Stop reason: HOSPADM

## 2023-10-06 RX ORDER — FOLIC ACID 1 MG/1
1 TABLET ORAL DAILY
Status: DISCONTINUED | OUTPATIENT
Start: 2023-10-06 | End: 2023-10-10 | Stop reason: HOSPADM

## 2023-10-06 RX ORDER — GLUCAGON 1 MG
1 KIT INJECTION
Status: DISCONTINUED | OUTPATIENT
Start: 2023-10-06 | End: 2023-10-10 | Stop reason: HOSPADM

## 2023-10-06 RX ORDER — HYDROMORPHONE HYDROCHLORIDE 1 MG/ML
1 INJECTION, SOLUTION INTRAMUSCULAR; INTRAVENOUS; SUBCUTANEOUS EVERY 4 HOURS PRN
Status: DISCONTINUED | OUTPATIENT
Start: 2023-10-06 | End: 2023-10-08

## 2023-10-06 RX ORDER — POTASSIUM CHLORIDE 20 MEQ/1
40 TABLET, EXTENDED RELEASE ORAL ONCE
Status: COMPLETED | OUTPATIENT
Start: 2023-10-06 | End: 2023-10-06

## 2023-10-06 RX ORDER — ALUMINUM HYDROXIDE, MAGNESIUM HYDROXIDE, AND SIMETHICONE 2400; 240; 2400 MG/30ML; MG/30ML; MG/30ML
30 SUSPENSION ORAL EVERY 6 HOURS PRN
Status: DISCONTINUED | OUTPATIENT
Start: 2023-10-06 | End: 2023-10-10 | Stop reason: HOSPADM

## 2023-10-06 RX ORDER — ENOXAPARIN SODIUM 100 MG/ML
40 INJECTION SUBCUTANEOUS EVERY 24 HOURS
Status: DISCONTINUED | OUTPATIENT
Start: 2023-10-06 | End: 2023-10-10 | Stop reason: HOSPADM

## 2023-10-06 RX ORDER — ACETAMINOPHEN 500 MG
1000 TABLET ORAL EVERY 8 HOURS PRN
Status: DISCONTINUED | OUTPATIENT
Start: 2023-10-06 | End: 2023-10-10 | Stop reason: HOSPADM

## 2023-10-06 RX ORDER — IBUPROFEN 200 MG
24 TABLET ORAL
Status: DISCONTINUED | OUTPATIENT
Start: 2023-10-06 | End: 2023-10-10 | Stop reason: HOSPADM

## 2023-10-06 RX ORDER — NALOXONE HCL 0.4 MG/ML
0.02 VIAL (ML) INJECTION
Status: DISCONTINUED | OUTPATIENT
Start: 2023-10-06 | End: 2023-10-10 | Stop reason: HOSPADM

## 2023-10-06 RX ORDER — TALC
6 POWDER (GRAM) TOPICAL NIGHTLY PRN
Status: DISCONTINUED | OUTPATIENT
Start: 2023-10-06 | End: 2023-10-10 | Stop reason: HOSPADM

## 2023-10-06 RX ORDER — POLYETHYLENE GLYCOL 3350 17 G/17G
17 POWDER, FOR SOLUTION ORAL DAILY
Status: DISCONTINUED | OUTPATIENT
Start: 2023-10-06 | End: 2023-10-10 | Stop reason: HOSPADM

## 2023-10-06 RX ORDER — SODIUM CHLORIDE, SODIUM LACTATE, POTASSIUM CHLORIDE, CALCIUM CHLORIDE 600; 310; 30; 20 MG/100ML; MG/100ML; MG/100ML; MG/100ML
INJECTION, SOLUTION INTRAVENOUS CONTINUOUS
Status: ACTIVE | OUTPATIENT
Start: 2023-10-06 | End: 2023-10-06

## 2023-10-06 RX ORDER — HYDROMORPHONE HYDROCHLORIDE 1 MG/ML
2 INJECTION, SOLUTION INTRAMUSCULAR; INTRAVENOUS; SUBCUTANEOUS EVERY 4 HOURS PRN
Status: DISCONTINUED | OUTPATIENT
Start: 2023-10-06 | End: 2023-10-08

## 2023-10-06 RX ORDER — IBUPROFEN 200 MG
16 TABLET ORAL
Status: DISCONTINUED | OUTPATIENT
Start: 2023-10-06 | End: 2023-10-10 | Stop reason: HOSPADM

## 2023-10-06 RX ORDER — SODIUM CHLORIDE 0.9 % (FLUSH) 0.9 %
10 SYRINGE (ML) INJECTION EVERY 6 HOURS PRN
Status: DISCONTINUED | OUTPATIENT
Start: 2023-10-06 | End: 2023-10-10 | Stop reason: HOSPADM

## 2023-10-06 RX ORDER — ONDANSETRON 2 MG/ML
4 INJECTION INTRAMUSCULAR; INTRAVENOUS EVERY 8 HOURS PRN
Status: DISCONTINUED | OUTPATIENT
Start: 2023-10-06 | End: 2023-10-06

## 2023-10-06 RX ORDER — HYDROMORPHONE HYDROCHLORIDE 1 MG/ML
2 INJECTION, SOLUTION INTRAMUSCULAR; INTRAVENOUS; SUBCUTANEOUS ONCE
Status: COMPLETED | OUTPATIENT
Start: 2023-10-06 | End: 2023-10-06

## 2023-10-06 RX ADMIN — SODIUM CHLORIDE, POTASSIUM CHLORIDE, SODIUM LACTATE AND CALCIUM CHLORIDE: 600; 310; 30; 20 INJECTION, SOLUTION INTRAVENOUS at 01:10

## 2023-10-06 RX ADMIN — HYDROMORPHONE HYDROCHLORIDE 2 MG: 1 INJECTION, SOLUTION INTRAMUSCULAR; INTRAVENOUS; SUBCUTANEOUS at 06:10

## 2023-10-06 RX ADMIN — HYDROMORPHONE HYDROCHLORIDE 2 MG: 1 INJECTION, SOLUTION INTRAMUSCULAR; INTRAVENOUS; SUBCUTANEOUS at 10:10

## 2023-10-06 RX ADMIN — MEROPENEM 2 G: 1 INJECTION INTRAVENOUS at 01:10

## 2023-10-06 RX ADMIN — FOLIC ACID 1 MG: 1 TABLET ORAL at 08:10

## 2023-10-06 RX ADMIN — HYDROMORPHONE HYDROCHLORIDE 2 MG: 1 INJECTION, SOLUTION INTRAMUSCULAR; INTRAVENOUS; SUBCUTANEOUS at 02:10

## 2023-10-06 RX ADMIN — ALUMINUM HYDROXIDE, MAGNESIUM HYDROXIDE, AND DIMETHICONE 30 ML: 400; 400; 40 SUSPENSION ORAL at 06:10

## 2023-10-06 RX ADMIN — BICTEGRAVIR SODIUM, EMTRICITABINE, AND TENOFOVIR ALAFENAMIDE FUMARATE 1 TABLET: 50; 200; 25 TABLET ORAL at 08:10

## 2023-10-06 RX ADMIN — CEFEPIME 2 G: 2 INJECTION, POWDER, FOR SOLUTION INTRAVENOUS at 06:10

## 2023-10-06 RX ADMIN — POTASSIUM CHLORIDE 40 MEQ: 1500 TABLET, EXTENDED RELEASE ORAL at 10:10

## 2023-10-06 RX ADMIN — HYDROMORPHONE HYDROCHLORIDE 2 MG: 1 INJECTION, SOLUTION INTRAMUSCULAR; INTRAVENOUS; SUBCUTANEOUS at 12:10

## 2023-10-06 RX ADMIN — MEROPENEM 2 G: 1 INJECTION INTRAVENOUS at 06:10

## 2023-10-06 RX ADMIN — SENNOSIDES AND DOCUSATE SODIUM 1 TABLET: 50; 8.6 TABLET ORAL at 08:10

## 2023-10-06 RX ADMIN — PANTOPRAZOLE SODIUM 40 MG: 40 TABLET, DELAYED RELEASE ORAL at 08:10

## 2023-10-06 RX ADMIN — PROCHLORPERAZINE EDISYLATE 5 MG: 5 INJECTION INTRAMUSCULAR; INTRAVENOUS at 01:10

## 2023-10-06 RX ADMIN — HYDROMORPHONE HYDROCHLORIDE 1 MG: 1 INJECTION, SOLUTION INTRAMUSCULAR; INTRAVENOUS; SUBCUTANEOUS at 02:10

## 2023-10-06 RX ADMIN — KETOROLAC TROMETHAMINE 15 MG: 30 INJECTION, SOLUTION INTRAMUSCULAR; INTRAVENOUS at 08:10

## 2023-10-06 RX ADMIN — PROCHLORPERAZINE EDISYLATE 5 MG: 5 INJECTION INTRAMUSCULAR; INTRAVENOUS at 05:10

## 2023-10-06 NOTE — PLAN OF CARE
VSS  No signs of evident distress  Pain medication admin as per MAR  Ambulating in room independently.  Non slip socks encouraged  Clear liquid diet  LR infusing at 125mL/hr  Right AC 18g PIV dressing CDI  Meropenem DC, cefepime added to medication regimen.  Bed in lowest locked position.  Call light within reach.  Instructed to call for assistance.  Pt. Expressed understanding.  Educated on plan of care.

## 2023-10-06 NOTE — ED NOTES
Pt care assumed. Report received by ROLANDO Black. Pt lying in stretcher in low and locked position and side rails raised x2. Call light, pt's belongings, and bedside table within pt's reach. Pt on continuous cardiac monitoring, pulse oximetry, and BP cycling every 30 minutes. Pt in NAD and verbalized no needs at this time.

## 2023-10-06 NOTE — ASSESSMENT & PLAN NOTE
23 year old male with medical history of HIV on HAART last CD4 296 on biktarvy, polycythemia getting therapeutic phelbotomy, alcohol use disorder, tobacco use, hepatic steatosis, UGIB secondary to brandy-hitchcock tear (EGD 9/18), 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 He has been having similar pain since 9/20 and has had multiple CTAP showing necrotizing pancreatitis and most recently multilobulated peripancreatic fluid collections adjacent to the pancreatic tail and body, partially encapsulated and suggestive of pseudocysts. The largest inferior component of this collection extends anteriorly and measures 7.4 x 6.5 cm (2-76) and the largest superior component measures 6.5 x 3.3 cm (2-43).     Problem list:  1. Acute peripancreatic fluid collections  2. Recurrent pancreatitis, etiology likely alcohol ()  3. Splenic vein thrombus   4. HIV on HAART    Recommendations:  - No endoscopic intervention planned for pancreatic fluid collections as it would be too early for drainage.   -  on alcohol and tobacco cessation as they are contributing factors to recurrent pancreatitis episodes.  - Continue supportive care.

## 2023-10-06 NOTE — CONSULTS
"[facility] - Transplant Stepdown  Gastroenterology  Consult Note    Patient Name: Tasia Cardona  MRN: [id number]  Admission Date: 10/5/2023  Hospital Length of Stay: 1 days  Code Status: Full Code   Attending Provider: Brigida Barrow MD   Consulting Provider: Jia Medina MD  Primary Care Physician: No, Primary Doctor  Principal Problem:Acute necrotizing pancreatitis    Inpatient consult to Gastroenterology [facility]  Consult performed by: Jia Medina MD  Consult ordered by: Toby Thompson MD        Subjective:     HPI:  Tasia Cardona is a 23 year old male for whom [facility] is consulted for necrotizing pancreatitis with large pseudocysts concerning for abscess. He has a medical history of HIV on HAART last CD4 296 on biktarvy, polycythemia getting therapeutic phelbotomy, alcohol use disorder, hepatic steatosis, UGIB secondary to corinne-hitchcock tear (EGD 9/18), newly diagnosed splenic vein thrombosis 9/2023.     Pt presents with sharp right sided abdominal pain radiating to the left of his abdomen that has been constant since 10/05. Associated nausea and vomiting. He was recently admitted to [facility] [place] from 9/17 to 9/25 with an upper GI bleed with EGD performed on 9/18 showing a Corinne-Hitchcock tear. During that admission he developed acute epigastric abdominal pain with an elevated lipase of 332. CTAP with contrast on 9/20 showed necrotizing pancreatitis with no acute necrotic collections, along with splenic vein thrombosis. He was not started on AC per Heme recommendations. PETH level 682 on 9/25. He was recently admitted to [facility] from 9/29 to 10/04 due to uncontrolled abdominal pain and fever. CTAP on 9/29 showed, "Large communicating peripancreatic collections compressing and displacing the pancreatic parenchyma. Nonocclusive thrombus in the superior mesenteric vein and splenic vein." His pain has resolved at the time of discharge until it started again on 10/05. On arrival to [facility] [place], he was " afebrile and HDS. Laboratory workup notable for leukocytosis (23), lipase 76, otherwise normal liver enzymes and INR. CTAP with contrast showing interval development of multilobulated peripancreatic fluid collections adjacent to the pancreatic tail and body, partially encapsulated and suggestive of pseudocysts. He was started on Meropenem and admitted to .      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       Past Surgical History:   Procedure Laterality Date    ESOPHAGOGASTRODUODENOSCOPY N/A 9/18/2023    Procedure: EGD (ESOPHAGOGASTRODUODENOSCOPY);  Surgeon: Kg Cleaning MD;  Location: [address]);  Service: Endoscopy;  Laterality: N/A;       Review of patient's allergies indicates:   Allergen Reactions    [place] Swelling    Pcn [penicillins] Hives    Pecan nut Swelling    Soy     Sulfa (sulfonamide antibiotics) Hives     Family History       Problem Relation (Age of Onset)    Breast cancer Maternal Grandmother    Cancer Mother, Brother    No Known Problems Father    Ovarian cancer Mother    Pancreatitis Mother          Tobacco Use    Smoking status: Some Days     Types: Cigarettes    Smokeless tobacco: Never   Substance and Sexual Activity    Alcohol use: Yes    Drug use: Never    Sexual activity: Not on file     Review of Systems   Constitutional:  Negative for chills and fever.   Gastrointestinal:  Positive for abdominal pain, nausea and vomiting.     Objective:     Vital Signs (Most Recent):  Temp: 98.5 °F (36.9 °C) (10/06/23 0838)  Pulse: 107 (10/06/23 0838)  Resp: 20 (10/06/23 1041)  BP: 120/78 (10/06/23 0838)  SpO2: 95 % (10/06/23 0838) Vital Signs (24h Range):  Temp:  [98 °F (36.7 °C)-99.5  °F (37.5 °C)] 98.5 °F (36.9 °C)  Pulse:  [101-133] 107  Resp:  [15-20] 20  SpO2:  [91 %-100 %] 95 %  BP: (117-187)/(70-89) 120/78     Weight: 72.1 kg (158 lb 15.2 oz) (10/05/23 2220)  Body mass index is 22.81 kg/m².      Intake/Output Summary (Last 24 hours) at 10/6/2023 1052  Last data filed at 10/6/2023 0232  Gross per 24 hour   Intake 2100 ml   Output 450 ml   Net 1650 ml       Lines/Drains/Airways       Peripheral Intravenous Line  Duration                  Peripheral IV - Single Lumen 10/05/23 1515 18 G Right Antecubital <1 day                     Physical Exam  Vitals reviewed.   Constitutional:       General: He is not in acute distress.     Appearance: He is not ill-appearing.   Eyes:      General: No scleral icterus.  Cardiovascular:      Rate and Rhythm: Normal rate.   Pulmonary:      Effort: Pulmonary effort is normal. No respiratory distress.   Abdominal:      General: There is no distension.      Tenderness: There is abdominal tenderness. There is no guarding or rebound.   Skin:     Coloration: Skin is not jaundiced.   Neurological:      Mental Status: He is alert. Mental status is at baseline.          Significant Labs:  All pertinent lab results from the last 24 hours have been reviewed.    Significant Imaging:  Imaging results within the past 24 hours have been reviewed.    Assessment/Plan:     GI  * Acute necrotizing pancreatitis  23 year old male with medical history of HIV on HAART last CD4 296 on biktarvy, polycythemia getting therapeutic phelbotomy, alcohol use disorder, tobacco use, hepatic steatosis, UGIB secondary to brandy-hitchcock tear (EGD 9/18), 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 He has been having similar pain since 9/20 and has had multiple CTAP showing necrotizing pancreatitis and most recently multilobulated peripancreatic fluid collections adjacent to the pancreatic tail and body, partially encapsulated and suggestive of  pseudocysts. The largest inferior component of this collection extends anteriorly and measures 7.4 x 6.5 cm (2-76) and the largest superior component measures 6.5 x 3.3 cm (2-43). Low suspicion for infected pseudocyst.     Problem list:  1. Acute peripancreatic necrosis  2. Recurrent pancreatitis, etiology likely alcohol ()  3. Alcohol use disorder  4. Tobacco use  5. Splenic vein thrombus   6. HIV on HAART    Recommendations:  - No endoscopic intervention planned for pancreatic fluid collections given they are immature as the development is less than 4 weeks.  - Will need to wait at least 4 weeks for the pseudocyst to mature to consider endoscopic drainage.   - If patient decompensates, would consider IR consult for drainage.  -  on alcohol and tobacco cessation as they are contributing factors to recurrent pancreatitis episodes.  - Continue supportive care.     Thank you for your consult. I will follow-up with patient. Please contact us if you have any additional questions.    Jia Medina MD  Gastroenterology  [facility] - Transplant Stepdown

## 2023-10-06 NOTE — ASSESSMENT & PLAN NOTE
Complication from necrotizing pancreatitis - not on therapeutic anticoagulation  Continue DVT ppx dose enoxaparin

## 2023-10-06 NOTE — ASSESSMENT & PLAN NOTE
Reported in [facility] record patient with PETH level elevated 680s.  He reports last alcohol use is prior to initial admission with pancreatitis 9/17.   -

## 2023-10-06 NOTE — CONSULTS
[facility] - Transplant Stepdown  Infectious Disease  Consult Note    Patient Name: Tasia Cardona  MRN: [id number]  Admission Date: 10/5/2023  Hospital Length of Stay: 1 days  Attending Physician: Brigida Barrow MD  Primary Care Provider: Pina Jones NP     Isolation Status: No active isolations    Patient information was obtained from patient and ER records.      Inpatient consult to Infectious Diseases  Consult performed by: Anabelle Rosas,   Consult ordered by: Toby Thompson MD        Assessment/Plan:     ID  HIV infection  Currently on Biktarvy, has been complaint with medication and receiving it while in patient. Last CD4 count on 9/20 was 296. Cause of pancreatitis more likely due to alcohol (positive peth at [facility]).   - Follow up with Dr. Herrera in clinic  - Continue Biktarvy     GI  * Acute necrotizing pancreatitis  23 year old with a history of HIV, brandy-hitchcock tear, recent pancreatitis c/b zehra-pancreatic fluid collection who presented for worsening abdominal pain. Recently discharged on 10/4 from [facility] for similar presentation and was placed on Cipro/Flagyl due to fevers and leukocytosis w/ end date of 10/5. Has a leukocytosis of 23.83 up from 11.0 on 10/4. CT A/P showed interval development of multi lobulated peripancreatic fluid collection suggesting pancreatic pseudo-cysts. No air was seen in the fluid collection. He was started on meropenem due to penicillin allergy. GI evaluated, defer fluid drainage at this time.     Recommendations:  1. Leukocytosis and abdominal pain likely related to pancreatitis, no air seen in peripancreatic fluid, however cannot rule out infection. GI deferring drainage at this time.   2. Switch from Meropenem to Cefepime given his penicillin allergy, anticipate 3-4 week course  3. Supportive care per primary   4. Follow-up with ID clinic outpatient         Thank you for your consult. I will sign off. Please contact us if you have any additional  questions.    Anabelle Rosas, DO  Infectious Disease  [facility] - Transplant Stepdown    Subjective:     Principal Problem: Acute necrotizing pancreatitis    HPI: Mr. Cardona is a 23 year old male with a history of with a history of HIV on Biktarvy, Asthma, polycythemia who presents with worsening abdominal pain since discharge from [facility] on 10/4/23. Patient had recent admission on 9/17 for Corinne-Chauhan tear, hematemesis. EGD was performed at that visit after which he developed acute pancreatitis with splenic vein thrombosis. He was treated inpatient with vanc/cefepime and discharged once tolerating diet. He was admitted to [facility] on 9/29 for intractable abdominal pain, CT at that time was showing large encapsulating peripancreatic fluid collection. He was treated w/ meropenem and discharged w/ Cipro/Flagyl w/ end date of 10/5. Patient returned on 10/5 again with worsening abdominal pain. CT abd/pelvis showed development of multi lobulated peripancreatic fluid collections appearing to communicate with one another. Labs significant for wbc of 23.83.  He was treated with cefepime/flagyl, morphine, ketamine in the ED and admitted. He is currently continued on meropenem. Patient denies any alcohol use since episode of pancreatitis, has been complaint with his Biktarvy. Establishing care with Dr. Herrera for HIV management.                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       Past Surgical History:   Procedure Laterality Date    ESOPHAGOGASTRODUODENOSCOPY N/A 9/18/2023    Procedure: EGD (ESOPHAGOGASTRODUODENOSCOPY);  Surgeon: Kg Cleaning MD;  Location: [facility] ([address] [place] [address]);  Service: Endoscopy;  Laterality:  N/A;       Review of patient's allergies indicates:   Allergen Reactions    [place] Swelling    Pcn [penicillins] Hives    Pecan nut Swelling    Soy     Sulfa (sulfonamide antibiotics) Hives       Medications:  Medications Prior to Admission   Medication Sig    cxsxmppmq-okjmzvmt-nvkemqn ala (BIKTARVY) -25 mg (25 kg or greater) Take 1 tablet by mouth once daily.    ciprofloxacin HCl (CIPRO) 500 MG tablet Take 500 mg by mouth every 12 (twelve) hours.    folic acid (FOLVITE) 1 MG tablet Take 1 tablet (1 mg total) by mouth once daily.    ibuprofen (ADVIL,MOTRIN) 400 MG tablet Take 400 mg by mouth every 4 (four) hours as needed (Inflammation).    metroNIDAZOLE (FLAGYL) 500 MG tablet Take 500 mg by mouth every 8 (eight) hours.    nicotine (NICODERM CQ) 14 mg/24 hr Place 1 patch onto the skin once daily.    oxyCODONE (ROXICODONE) 5 MG immediate release tablet Take 5 mg by mouth every 6 (six) hours as needed for Pain.    pantoprazole (PROTONIX) 40 MG tablet Take 1 tablet (40 mg total) by mouth 2 (two) times daily.    promethazine (PHENERGAN) 25 MG tablet Take 25 mg by mouth every 6 (six) hours as needed for Nausea/Vomiting.     Antibiotics (From admission, onward)      Start     Stop Route Frequency Ordered    10/05/23 2115  meropenem (MERREM) 2 g in sodium chloride 0.9% 100 mL IVPB         -- IV Every 8 hours (non-standard times) 10/05/23 2006          Antifungals (From admission, onward)      None          Antivirals (From admission, onward)          Stop Route Frequency     slmajltui-rybfnrql-yakbuyn ala         -- Oral Daily             Immunization History   Administered Date(s) Administered    COVID-19, MRNA, LN-S, PF (MODERNA FULL 0.5 ML DOSE) 10/08/2021    Hepatitis B (recombinant) Adjuvanted, 2 dose 06/16/2022, 09/20/2022    Pneumococcal Conjugate - 20 Valent 06/16/2022    Tdap 08/17/2019    meningococcal Conjugate (MCV4O) 06/16/2022       Family History       Problem Relation (Age of Onset)    Breast cancer  Maternal Grandmother    Cancer Mother, Brother    No Known Problems Father    Ovarian cancer Mother    Pancreatitis Mother          Social History     Socioeconomic History    Marital status: Single   Tobacco Use    Smoking status: Some Days     Types: Cigarettes    Smokeless tobacco: Never   Substance and Sexual Activity    Alcohol use: Yes    Drug use: Never     Social Determinants of Health     Financial Resource Strain: Low Risk  (9/18/2023)    Overall Financial Resource Strain (CARDIA)     Difficulty of Paying Living Expenses: Not hard at all   Food Insecurity: No Food Insecurity (9/18/2023)    Hunger Vital Sign     Worried About Running Out of Food in the Last Year: Never true     Ran Out of Food in the Last Year: Never true   Transportation Needs: No Transportation Needs (9/18/2023)    PRAPARE - Transportation     Lack of Transportation (Medical): No     Lack of Transportation (Non-Medical): No   Physical Activity: Inactive (9/18/2023)    Exercise Vital Sign     Days of Exercise per Week: 0 days     Minutes of Exercise per Session: 0 min   Stress: Stress Concern Present (9/18/2023)    [employer] - Occupational Stress Questionnaire     Feeling of Stress : To some extent   Social Connections: Socially Isolated (9/18/2023)    Social Connection and Isolation Panel [NHANES]     Frequency of Communication with Friends and Family: More than three times a week     Frequency of Social Gatherings with Friends and Family: Once a week     Attends Scientology Services: Never     Active Member of Clubs or Organizations: No     Attends Club or Organization Meetings: Never     Marital Status: Never    Housing Stability: Low Risk  (9/18/2023)    Housing Stability Vital Sign     Unable to Pay for Housing in the Last Year: No     Number of Places Lived in the Last Year: 1     Unstable Housing in the Last Year: No     Review of Systems   Constitutional:  Negative for chills, fatigue and fever.    HENT:  Negative for congestion and sore throat.    Respiratory:  Negative for cough and shortness of breath.    Cardiovascular:  Negative for chest pain.   Gastrointestinal:  Positive for abdominal pain, diarrhea and nausea. Negative for constipation and vomiting.   Genitourinary:  Negative for dysuria and flank pain.   Musculoskeletal:  Negative for joint swelling.   Neurological:  Negative for dizziness and headaches.   Psychiatric/Behavioral:  Negative for confusion. The patient is not nervous/anxious.      Objective:     Vital Signs (Most Recent):  Temp: 98.5 °F (36.9 °C) (10/06/23 0838)  Pulse: 107 (10/06/23 0838)  Resp: 20 (10/06/23 1041)  BP: 120/78 (10/06/23 0838)  SpO2: 95 % (10/06/23 0838) Vital Signs (24h Range):  Temp:  [98 °F (36.7 °C)-99.5 °F (37.5 °C)] 98.5 °F (36.9 °C)  Pulse:  [101-133] 107  Resp:  [15-20] 20  SpO2:  [91 %-100 %] 95 %  BP: (117-187)/(70-89) 120/78     Weight: 72.1 kg (158 lb 15.2 oz)  Body mass index is 22.81 kg/m².    Estimated Creatinine Clearance: 193.6 mL/min (based on SCr of 0.6 mg/dL).     Physical Exam  HENT:      Head: Normocephalic.      Nose: Nose normal.      Mouth/Throat:      Mouth: Mucous membranes are moist.   Eyes:      Pupils: Pupils are equal, round, and reactive to light.   Cardiovascular:      Rate and Rhythm: Normal rate.   Pulmonary:      Effort: Pulmonary effort is normal.   Abdominal:      General: Abdomen is flat.      Tenderness: There is abdominal tenderness (Severe right sided tenderness to palpation).   Musculoskeletal:         General: Normal range of motion.      Right lower leg: No edema.      Left lower leg: No edema.      Comments: No calf tenderness   Skin:     General: Skin is warm.   Neurological:      General: No focal deficit present.      Mental Status: He is alert and oriented to person, place, and time.   Psychiatric:         Mood and Affect: Mood normal.          Significant Labs: All pertinent labs within the past 24 hours have been  reviewed.    Significant Imaging: I have reviewed all pertinent imaging results/findings within the past 24 hours.

## 2023-10-06 NOTE — PLAN OF CARE
[facility] - Transplant Stepdown  Initial Discharge Assessment       Primary Care Provider: Pina Jones NP    Admission Diagnosis: Pancreatic pseudocyst [K86.3]  Tachycardia [R00.0]  Abdominal pain [R10.9]  Leukocytosis, unspecified type [D72.829]    Admission Date: 10/5/2023  Expected Discharge Date: 10/9/2023         Payor: MEDICAID / Plan: [facility] COMMUNITY PLAN [facility] ([place] MEDICAID) / Product Type: Managed Medicaid /     Extended Emergency Contact Information  Primary Emergency Contact: Denice Cardona  Mobile Phone: [phone]  Relation: Mother  Preferred language: English   needed? No    Discharge Plan A: Home  Discharge Plan B: Home      [id number] - [place], [place] - [address] AT [facility] [address]  [address]  [facility] [address]  Phone: [phone] Fax: [phone]    [facility]  [place] - [address] [facility] [address]  [address] [place] [address]  Phone: [phone] Fax: [phone]      Initial Assessment (most recent)       Adult Discharge Assessment - 10/06/23 1138          Discharge Assessment    Assessment Type Discharge Planning Assessment     Confirmed/corrected address, phone number and insurance Yes     Confirmed Demographics Correct on Facesheet     Source of Information patient     People in Home alone     Do you expect to return to your current living situation? Yes     Prior to hospitilization cognitive status: Alert/Oriented     Current cognitive status: Alert/Oriented     Equipment Currently Used at Home none     Readmission within 30 days? Yes     Patient currently being followed by outpatient case management? No     Do you take prescription medications? Yes     Do you have prescription coverage? Yes     Do you have any problems affording any of your prescribed medications? TBD     Are you on dialysis? No     Do you take coumadin? No     Discharge Plan A Home     Discharge Plan B  Home                     Readmission Assessment (most recent)       Readmission Assessment - 10/06/23 1139          Readmission    Why were you hospitalized in the last 30 days? Acute necrotizing pancreatitis     When you left the hospital where did you go? Home Alone     Did you have  a follow-up appointment on discharge? Yes     Did you go? No     Was this a planned readmission? No                          SW completed Discharge Planning Assessment with patient via bedside. Discharge planning booklet given to patient/family and whiteboard updated with CASTILLO and phone #. All questions answered.    Patient reported that he will have transportation upon discharge.     Patient reported that he lives alone, and prior to hospitalization he was independent with his ADL's. Patient reported that he is not on dialysis and does not go to a Coumadin clinic.      Patient lives in a [facility] with 0 steps to enter.       Kamala Mcgowan LMSW  [facility]  Ext. [phone]

## 2023-10-06 NOTE — NURSING
Nurses Note -- 4 Eyes      10/6/2023   2:13 AM      Skin assessed during: Admit      [x] No Altered Skin Integrity Present    []Prevention Measures Documented      [] Yes- Altered Skin Integrity Present or Discovered   [] LDA Added if Not in Epic (Describe Wound)   [] New Altered Skin Integrity was Present on Admit and Documented in LDA   [] Wound Image Taken    Wound Care Consulted? No    Attending Nurse:  Pinky Kim RN/Staff Member:   ROLANDO Umaña

## 2023-10-06 NOTE — H&P
[facility] - Transplant [facility] Medicine  History & Physical    Patient Name: Tasia Cardona  MRN: [id number]  Patient Class: IP- Inpatient  Admission Date: 10/5/2023  Attending Physician: Brigida Barrow MD [facility] HOSP MED X  Admitting Physician: Toby Thompson MD  Primary Care Provider: Brittany Primary Doctor    Patient information was obtained from patient, past medical records and ER records.     Subjective:     Principal Problem:Acute necrotizing pancreatitis    Chief Complaint:   Chief Complaint   Patient presents with    Abdominal Pain    Vomiting     Hx pancreatitis,  actively vomiting, diaphoretic        HPI: 23-year-old man with a history of HIV on HAART, asthma, polycythemia presents to the emergency department with complaints of uncontrolled abdominal pain diagnosis of necrotizing pancreatitis.    Recent history is that patient was admitted to [facility] [phone], presented with concerns for hematemesis Chauhan tear, post EGD he developed worsening abdominal pain, CT imaging obtained in consistent with acute necrotizing pancreatitis with splenic vein thrombosis.  Hematology consulted and anticoagulation not recommended for splenic vein thrombosis, he received 2 days of empiric IV antibiotic therapy with vancomycin and cefepime, Infectious Disease was consulted.  Prior to discharge patient was tolerating oral diet    He was then admitted to [facility] on September 29th to October 4th.  Presenting symptoms were uncontrolled abdominal pain and fevers CT scan demonstrated sequela of necrotizing pancreatitis with large encapsulating peripancreatic fluid collection.  He was started on empiric meropenem until October 2nd transition to oral ciprofloxacin and Flagyl and discharged with with oral antibiotic prescription.  Patient reported pain persisted he was discharged with oral oxycodone for pain management.    He went home on October 4th approximately around 3:00 p.m. drink only some liquids that  evening and the morning of October 5th he had a smoothie, eggs and cinnamon toast for breakfast took a nap around 11:00 a.m. and woke up at 1:00 p.m. with severe abdominal pain in the epigastrium and upper quadrants.  He states while taking shower he felt presyncopal did not fall and sat on the ground, decided to return to the hospital felt he may have been discharged too soon do [facility] hospitalization patient had PETH checked - value @ 682, suspected to be etiology for pancreatitis.  Today patient says he has had no alcohol use since prior to initial September hospitalization.   He smokes cigarettes approx 1 pack every 2-3/weeks.     With repeat vomiting today he did not have any hematemesis.   At discharge on October 4th white count was 11 and today is elevated at 23.  Pain was so severe in the emergency department that he required ketamine 20mg dose to help improve symptoms.     ED Treatment; Cefepime,  Droperidol, LR 1L, NS 1L, Morphine 4mg x 2, Flagyl 500mg, Zofran.   Ketamine 20mg.       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    Mild intermittent asthma without complication 9/18/2023    Normocytic anemia 9/18/2023    Positive CMV IgG serology 9/21/2023    Splenic vein thrombosis 9/20/2023       Past Surgical History:   Procedure Laterality Date    ESOPHAGOGASTRODUODENOSCOPY N/A 9/18/2023    Procedure: EGD (ESOPHAGOGASTRODUODENOSCOPY);  Surgeon: Kg Cleaning MD;  Location: [address];  Service: Endoscopy;  Laterality: N/A;       Review of patient's allergies indicates:   Allergen Reactions    Oblong Swelling    Pcn [penicillins] Hives    Pecan nut Swelling    Soy     Sulfa (sulfonamide antibiotics) Hives       No current facility-administered medications on file prior to encounter.     Current Outpatient Medications on File Prior to Encounter    Medication Sig    aouwygqev-mwbnmsip-cmzrsai ala (BIKTARVY) -25 mg (25 kg or greater) Take 1 tablet by mouth once daily.    ciprofloxacin HCl (CIPRO) 500 MG tablet Take 500 mg by mouth every 12 (twelve) hours.    folic acid (FOLVITE) 1 MG tablet Take 1 tablet (1 mg total) by mouth once daily.    ibuprofen (ADVIL,MOTRIN) 400 MG tablet Take 400 mg by mouth every 4 (four) hours as needed (Inflammation).    metroNIDAZOLE (FLAGYL) 500 MG tablet Take 500 mg by mouth every 8 (eight) hours.    nicotine (NICODERM CQ) 14 mg/24 hr Place 1 patch onto the skin once daily.    oxyCODONE (ROXICODONE) 5 MG immediate release tablet Take 5 mg by mouth every 6 (six) hours as needed for Pain.    pantoprazole (PROTONIX) 40 MG tablet Take 1 tablet (40 mg total) by mouth 2 (two) times daily.    promethazine (PHENERGAN) 25 MG tablet Take 25 mg by mouth every 6 (six) hours as needed for Nausea/Vomiting.    ondansetron (ZOFRAN-ODT) 4 MG TbDL Take 1 tablet (4 mg total) by mouth every 6 (six) hours as needed (nausea). (Patient not taking: Reported on 9/18/2023)     Family History       Problem Relation (Age of Onset)    Breast cancer Maternal Grandmother    Cancer Mother, Brother    No Known Problems Father    Ovarian cancer Mother    Pancreatitis Mother          Tobacco Use    Smoking status: Some Days     Types: Cigarettes    Smokeless tobacco: Never   Substance and Sexual Activity    Alcohol use: Yes    Drug use: Never    Sexual activity: Not on file     Review of Systems   Constitutional:  Positive for appetite change, chills and fever.   HENT:  Negative for sinus pressure and sore throat.    Eyes:  Negative for visual disturbance.   Respiratory:  Negative for cough and shortness of breath.    Cardiovascular:  Negative for chest pain and leg swelling.   Gastrointestinal:  Positive for abdominal pain, nausea and vomiting.   Genitourinary:  Negative for dysuria.   Musculoskeletal:  Negative for back pain.   Skin:   Negative for rash.   Neurological:  Positive for weakness.        Pre-syncope   Psychiatric/Behavioral:  Negative for confusion.      Objective:     Vital Signs (Most Recent):  Temp: 98.5 °F (36.9 °C) (10/06/23 0027)  Pulse: 109 (10/06/23 0027)  Resp: 18 (10/06/23 0239)  BP: 129/89 (10/06/23 0027)  SpO2: (!) 94 % (10/06/23 0032) Vital Signs (24h Range):  Temp:  [98 °F (36.7 °C)-99.5 °F (37.5 °C)] 98.5 °F (36.9 °C)  Pulse:  [104-133] 109  Resp:  [15-20] 18  SpO2:  [91 %-100 %] 94 %  BP: (117-187)/(70-89) 129/89     Weight: 72.1 kg (158 lb 15.2 oz)  Body mass index is 22.81 kg/m².     Physical Exam  Vitals and nursing note reviewed.   Constitutional:       General: He is not in acute distress.  HENT:      Head: Normocephalic and atraumatic.      Mouth/Throat:      Mouth: Mucous membranes are moist.      Pharynx: Oropharynx is clear. No oropharyngeal exudate or posterior oropharyngeal erythema.   Eyes:      General: No scleral icterus.     Extraocular Movements: Extraocular movements intact.      Conjunctiva/sclera: Conjunctivae normal.      Pupils: Pupils are equal, round, and reactive to light.   Cardiovascular:      Rate and Rhythm: Regular rhythm. Tachycardia present.      Pulses: Normal pulses.      Heart sounds: No murmur heard.  Pulmonary:      Effort: Pulmonary effort is normal. No respiratory distress.      Breath sounds: No wheezing or rales.   Abdominal:      General: Abdomen is flat.      Tenderness: There is abdominal tenderness.      Comments: TEnderness to mild palpation in the epigastrum and RUQ/RLQ, mild rebound tenderness, voluntary guarding with deeper palpation.    Musculoskeletal:      Cervical back: Neck supple.      Right lower leg: No edema.      Left lower leg: No edema.   Lymphadenopathy:      Cervical: No cervical adenopathy.   Skin:     General: Skin is warm and dry.   Neurological:      General: No focal deficit present.      Mental Status: He is alert.   Psychiatric:         Mood and  "Affect: Mood normal.         Behavior: Behavior normal.              CRANIAL NERVES     CN III, IV, VI   Pupils are equal, round, and reactive to light.       Significant Labs: All pertinent labs within the past 24 hours have been reviewed.  CBC:   Recent Labs   Lab 10/04/23  0409 10/05/23  1514   WBC 11.0 23.83*   HGB 10.2* 11.9*   HCT 30.8* 37.9*   PLT  --  761*     CMP:   Recent Labs   Lab 10/05/23  1514      K 3.6      CO2 22*   *   BUN 8   CREATININE 0.8   CALCIUM 9.8   PROT 8.4   ALBUMIN 3.4*   BILITOT 0.4   ALKPHOS 97   AST 24   ALT 11   ANIONGAP 14     Cardiac Markers: No results for input(s): "CKMB", "MYOGLOBIN", "BNP", "TROPISTAT" in the last 48 hours.  Coagulation: No results for input(s): "PT", "INR", "APTT" in the last 48 hours.  Lactic Acid:   Recent Labs   Lab 10/05/23  2243   LACTATE 0.6     Magnesium: No results for input(s): "MG" in the last 48 hours.  Troponin: No results for input(s): "TROPONINI", "TROPONINIHS" in the last 48 hours.  Urine Studies: No results for input(s): "COLORU", "APPEARANCEUA", "PHUR", "SPECGRAV", "PROTEINUA", "GLUCUA", "KETONESU", "BILIRUBINUA", "OCCULTUA", "NITRITE", "UROBILINOGEN", "LEUKOCYTESUR", "RBCUA", "WBCUA", "BACTERIA", "SQUAMEPITHEL", "HYALINECASTS" in the last 48 hours.    Invalid input(s): "WRIGHTSUR"    Significant Imaging: I have reviewed all pertinent imaging results/findings within the past 24 hours.    [facility] CT ABDOMEN PELVIS WITH CONTRAST     REASON FOR STUDY: Abdominal abscess/infection suspected     TECHNIQUE:   A01 -- Contiguous 2.5 mm axial images from the lung bases through the upper thighs were obtained 65 seconds and 3 minutes after intravenous administration of contrast. Coronal and sagittal reformatted images were obtained utilizing post processing techniques at a separate workstation.     CONTRAST: IOHEXOL 350 MG IODINE/ML INTRAVENOUS SOLUTION:100mL     RADIATION DOSE: 678.8 (mGy.cm)     This CT utilized automated exposure " control and/or adjustment of the mA according to patient size and/or iterative reconstruction technique(s).     COMPARISON: None.     FINDINGS:     LOWER THORAX: Bandlike opacities in the left lung base may represent subsegmental atelectasis.   GREAT ARTERIES: No aortic dissection or aneurysm.   GREAT VEINS: There is a small filling defect within the superior mesenteric vein (series 202, image 62) and nonocclusive thrombus in the splenic vein (series 202, images 48-56).   SPINAL CANAL: No stenosis.   LIVER: Heterogeneous attenuation of the liver may be secondary to transient differences in attenuation although hepatitis and/or fatty infiltration cannot be ruled out.   BILIARY SYSTEM: The gallbladder is within normal limits. No intrahepatic ductal dilatation.   PANCREAS: There is a large communicating peripancreatic collection engulfing, compressing, and replacing the pancreas. This collection measures at least 14.1 x 7.2 x 6.1 cm (series 206, image 47; series 202, image 71).   SPLEEN: Nonocclusive splenic vein thrombus. No parenchymal abnormality.   ADRENAL GLANDS: The adrenal glands are symmetric bilaterally.   KIDNEYS & URETERS: Normal   BLADDER: The urinary bladder is within normal limits.   REPRODUCTIVE ORGANS: Within normal limits.   GI TRACT, MESENTERIES, & LIGAMENTS: No focal bowel wall thickening or enhancement. No bowel obstruction.   ...APPENDIX: Within normal limits.   PERITONEUM & PERITONEAL SPACE: There is minimal intraperitoneal free fluid.   RETROPERITONEUM & EXTRAPERITONEUM: Within normal limits.   BODY WALL & MUSCULATURE: No significant soft tissue edema or abnormal fluid collection.   BONES & JOINTS: No acute fracture or dislocation.   Exam End: 09/29/23 23:32    Specimen Collected: 09/29/23 23:44        CT ABDOMEN PELVIS WITH CONTRAST     CLINICAL HISTORY:  Nausea/vomiting;Abdominal pain, acute, nonlocalized;     TECHNIQUE:  Axial images of the abdomen and pelvis were acquired after the use of 75  coronal and sagittal reconstructions were also obtained     COMPARISON:  CT 09/22/2023, 09/18/2023, 12/07/2021     FINDINGS:  Images of the lower thorax are remarkable for a small left pleural effusion with associated compressive atelectasis of the left lower lobe.     Heart: No pericardial effusion.     Esophagus: Distal esophagus is patulous.     Stomach and duodenum: Linear focus of hyperdense material within the stomach, which may be related to ingested material.     Liver: Hepatomegaly measuring 22 cm in craniocaudal dimension.  Hepatic steatosis with geographic region of fat deposition..     Gallbladder: No calcified gallstones.     Bile Ducts: No evidence of dilated ducts.     Spleen: Splenomegaly.     Pancreas: Interval development of peripancreatic fluid collections about the tail and body, which may be contiguous with one another.  The largest inferior component of this collection extends anteriorly and measures 7.4 x 6.5 cm (2-76) and the largest superior component measures 6.5 x 3.3 cm (2-43).  These collections appear partially encapsulated and suggest pseudocysts.     Adrenals: Unremarkable.     Kidneys/Ureters: The kidneys enhance symmetrically without hydronephrosis or nephrolithiasis.  The bilateral ureters are unremarkable without calculi seen.  The urinary bladder is nondistended noting there may be residual wall thickening.     Reproductive organs: The prostate and scrotal content are unremarkable.     Bowel/Mesentery: There are several scattered perihepatic varices.  No evidence of inflammation or wall thickening.  Colon demonstrates no focal wall thickening.  Appendix is unremarkable.     Peritoneum: Scattered fluid through the pelvis, bilateral pericolic gutters, and right upper quadrant adjacent to the liver, which is decreased in volume compared to prior CT.  There is no free air.     Lymph nodes: Prominent lymph nodes through the upper abdomen and retroperitoneum, which may be reactive.      Abdominal wall:  There is bilateral gynecomastia.     Vasculature: The portal vein, proximal splenic vein, SMV, celiac axis and SMA all are patent.  The splenic vein is compressed/thrombosed, similar in appearance to the previous examination noting several gastric and mesenteric collaterals.     Bones: No acute fracture.     Impression:     Interval development of multilobulated peripancreatic fluid collections adjacent to the pancreatic tail and body is detailed above.  These collections appear inter communicate with one another.  Findings suggests pseudo cysts, no air to suggest infected collection though infected content is not excluded.  Correlation and follow-up is advised.     Small left pleural effusion, similar to CT 09/20/2023.     Continued thrombosis of the splenic vein noting perigastric collaterals.     Geographic hepatic steatosis, correlation with LFTs recommended.     Please see above for several additional findings.     This report was flagged in Epic as abnormal.     Electronically signed by resident: Sally Mcguire  Date:                                            10/05/2023  Time:                                           17:08     Electronically signed by: Juan Sexton MD  Date:                                            10/05/2023  Time:                                           17:44    Assessment/Plan:     * Acute necrotizing pancreatitis  Patient with necrotizing pancreatitis and now sequelae of large pseudocyst concerning for infection  -Admitted to [facility] from 9/29-10/04 - received empiric Meropenem until 10/02 then transitioned to Cirpo/Flagyl, on 10/4 WBC was 11 - 1 day from discharge patient presents for readmission and WBC 23.   -BLood cultures obtained   -Empiric Meropenem reordered  Infectious disease consult ordered for restricted abx use  -AES consult ordered - evaluate new imaging - is fluid collection/?abscess amenable to endoscopic drainage or should  Percutaneous aspiration vs  "drainage be pursued  For source control/cultures.     Continue IV antiemetics, lower rate IV fluids, IV opioid pain medications - increasing to 2mg IV dilaudid PRn for severe,  1mg IV prn for moderate pain.     Glucose is 150s - check A1c in AM, pt potentially at high risk to develop diabetes due to destruction from severity of pancreatitis.     Keep NPO except sips - given repeat admissions - patient may require low rate tube feed enteral nutrition vs parenteral nutrition - discuss with GI.     Pancreatic abscess  As per primary problem.       Splenic vein thrombosis  Complication from necrotizing pancreatitis - not on therapeutic anticoagulation  Continue DVT ppx dose enoxaparin      HIV infection  This patient in known to have HIV+ status (Have detected HIV PCR but never CD4 <200 or AIDS defining illness). Labs reviewed- No results found for: "CD4", No results found for: "HIVDNAPCR". Patient is on HAART. Will continue HAART. Prophylaxis was not indicated at this time- . Continue to monitor routine labs. Last CD4 count was   Lab Results   Component Value Date    ABSOLUTECD4 296 (L) 09/20/2023       We will consult Infectious disease at this time. Evaluate and treat HIV-associated diseases as indicated by specific problems listed below.     Hepatitis C antibody positive in blood  HCV AB positive.  HCV viral load negative  Refer to hepatology as outpatient.     Alcohol use disorder  Reported in [facility] record patient with PETH level elevated 680s.  He reports last alcohol use is prior to initial admission with pancreatitis 9/17.   -      Corinne-Chauhan tear  Present during 9/17 admission s/p EGD  With recurrent vomiting today he denies any hematemsis  Continue PPI       Polycythemia  Chronic - stable - follows with hematology as outpatient - labs show anemia at this time.         VTE Risk Mitigation (From admission, onward)         Ordered     enoxaparin injection 40 mg  Daily         10/06/23 0124     IP VTE HIGH RISK " PATIENT  Once         10/06/23 0124     Place sequential compression device  Until discontinued         10/06/23 0124                           Toby Thompson MD  Department of Hospital Medicine  [facility] - Transplant Stepdown

## 2023-10-06 NOTE — PLAN OF CARE
Pt arrived via stretcher to room 61608 around 2220. PT walked from stretcher to bed. VSS. HR in the 100s. On RA. A&Ox4.  No skin issues noted. Pt NPO except for ice ships. Pt denied nausea. Pt c/o ABD pain. PRN hydromorphone given, please refer to MAR. PT on IVF @ 125. Pt voided linda urine. Please refer to I&O flowsheet for accurate UOP. Accuchecks Q8h. Accucheck at 0147 was 89. Call light and belongings within reach.

## 2023-10-06 NOTE — ED NOTES
Pt place on continuous CO2 monitoring. Pain 10/10 Medication given by Dr. Bull   Monitoring ongoing

## 2023-10-06 NOTE — HPI
"Tasia Cardona is a 23 year old male for whom AES is consulted for necrotizing pancreatitis with large pseudocysts concerning for abscess. He has a medical history of HIV on HAART last CD4 296 on biktarvy, polycythemia getting therapeutic phelbotomy, alcohol use disorder, hepatic steatosis, UGIB secondary to brandy-chauhan tear (EGD 9/18), newly diagnosed splenic vein thrombosis 9/2023.     Pt presents with sharp right sided abdominal pain radiating to the left of his abdomen that has been constant since 10/05. Associated nausea and vomiting. He was recently [place] [facility] [place] to 9/25 with an upper GI bleed with EGD performed on 9/18 showing a Brandy-Chauhan tear. During that admission he developed acute epigastric abdominal pain with an elevated lipase of 332. CTAP with contrast on 9/20 showed necrotizing pancreatitis with no acute necrotic collections, along with splenic vein thrombosis. He was not started on AC per Heme recommendations. PETH level 682 on 9/25. He was recently admitted to [facility] from 9/29 to 10/04 due to uncontrolled abdominal pain and fever. CTAP on 9/29 showed, "Large communicating peripancreatic collections compressing and displacing the pancreatic parenchyma. Nonocclusive thrombus in the superior mesenteric vein and splenic vein." His pain has resolved at the time of discharge until it started again on 10/05. On [place] [facility] he was afebrile and HDS. Laboratory workup notable for leukocytosis (23), lipase 76, otherwise normal liver enzymes and INR. CTAP with contrast showing interval development of multilobulated peripancreatic fluid collections adjacent to the pancreatic tail and body, partially encapsulated and suggestive of pseudocysts. He was started on Meropenem and admitted to .  "

## 2023-10-06 NOTE — ASSESSMENT & PLAN NOTE
"This patient in known to have HIV+ status (Have detected HIV PCR but never CD4 <200 or AIDS defining illness). Labs reviewed- No results found for: "CD4", No results found for: "HIVDNAPCR". Patient is on HAART. Will continue HAART. Prophylaxis was not indicated at this time- . Continue to monitor routine labs. Last CD4 count was   Lab Results   Component Value Date    ABSOLUTECD4 296 (L) 09/20/2023       We will consult Infectious disease at this time. Evaluate and treat HIV-associated diseases as indicated by specific problems listed below.   "

## 2023-10-06 NOTE — SUBJECTIVE & OBJECTIVE
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       Past Surgical History:   Procedure Laterality Date    ESOPHAGOGASTRODUODENOSCOPY N/A 9/18/2023    Procedure: EGD (ESOPHAGOGASTRODUODENOSCOPY);  Surgeon: Kg Cleaning MD;  Location: [facility] ([address] [place] [address]);  Service: Endoscopy;  Laterality: N/A;       Review of patient's allergies indicates:   Allergen Reactions    [place] Swelling    Pcn [penicillins] Hives    Pecan nut Swelling    Soy     Sulfa (sulfonamide antibiotics) Hives     Family History       Problem Relation (Age of Onset)    Breast cancer Maternal Grandmother    Cancer Mother, Brother    No Known Problems Father    Ovarian cancer Mother    Pancreatitis Mother          Tobacco Use    Smoking status: Some Days     Types: Cigarettes    Smokeless tobacco: Never   Substance and Sexual Activity    Alcohol use: Yes    Drug use: Never    Sexual activity: Not on file     Review of Systems   Constitutional:  Negative for chills and fever.   Gastrointestinal:  Positive for abdominal pain, nausea and vomiting.     Objective:     Vital Signs (Most Recent):  Temp: 98.5 °F (36.9 °C) (10/06/23 0838)  Pulse: 107 (10/06/23 0838)  Resp: 20 (10/06/23 1041)  BP: 120/78 (10/06/23 0838)  SpO2: 95 % (10/06/23 0838) Vital Signs (24h Range):  Temp:  [98 °F (36.7 °C)-99.5 °F (37.5 °C)] 98.5 °F (36.9 °C)  Pulse:  [101-133] 107  Resp:  [15-20] 20  SpO2:  [91 %-100 %] 95 %  BP: (117-187)/(70-89) 120/78     Weight: 72.1 kg (158 lb 15.2 oz) (10/05/23 2220)  Body mass index is 22.81 kg/m².      Intake/Output Summary (Last 24 hours) at 10/6/2023 1052  Last data filed at 10/6/2023 0232  Gross per 24 hour   Intake 2100 ml   Output 450 ml    Net 1650 ml       Lines/Drains/Airways       Peripheral Intravenous Line  Duration                  Peripheral IV - Single Lumen 10/05/23 1515 18 G Right Antecubital <1 day                     Physical Exam  Vitals reviewed.   Constitutional:       General: He is not in acute distress.     Appearance: He is not ill-appearing.   Eyes:      General: No scleral icterus.  Cardiovascular:      Rate and Rhythm: Normal rate.   Pulmonary:      Effort: Pulmonary effort is normal. No respiratory distress.   Abdominal:      General: There is no distension.      Tenderness: There is abdominal tenderness. There is no guarding or rebound.   Skin:     Coloration: Skin is not jaundiced.   Neurological:      Mental Status: He is alert. Mental status is at baseline.          Significant Labs:  All pertinent lab results from the last 24 hours have been reviewed.    Significant Imaging:  Imaging results within the past 24 hours have been reviewed.

## 2023-10-06 NOTE — ASSESSMENT & PLAN NOTE
23 year old with a history of HIV, brandy-hitchcock tear, recent pancreatitis c/b zehra-pancreatic fluid collection who presented for worsening abdominal pain. Recently discharged on 10/4 from [facility] for similar presentation and was placed on Cipro/Flagyl due to fevers and leukocytosis w/ end date of 10/5. Has a leukocytosis of 23.83 up from 11.0 on 10/4. CT A/P showed interval development of multi lobulated peripancreatic fluid collection suggesting pancreatic pseudo-cysts. No air was seen in the fluid collection. He was started on meropenem due to penicillin allergy. GI evaluated, defer fluid drainage at this time.     Recommendations:  1. Leukocytosis and abdominal pain likely related to pancreatitis, no air seen in peripancreatic fluid, however cannot rule out infection. GI deferring drainage at this time.   2. Switch from Meropenem to Cefepime given his penicillin allergy, anticipate 3-4 week course  3. Supportive care per primary   4. Follow-up with ID clinic outpatient

## 2023-10-06 NOTE — ASSESSMENT & PLAN NOTE
Present during 9/17 admission s/p EGD  With recurrent vomiting today he denies any hematemsis  Continue PPI   -Recent EGD Pathology results show   A. DUODENUM, BIOPSY:   - Duodenal mucosa with focal gastric metaplasia and focal superficial erosion, see comment.   - No evidence of infection is identified.   - No evidence of villous architectural distortion, increased intraepithelial lymphocytes, active inflammation, granulomas or malignancy.   - Multiple levels examined.     B. STOMACH, ANTRUM, BIOPSY:   - Antral mucosa with changes suggestive of reactive (chemical) gastropathy).   - No Helicobacter-type organisms identified on the *Helicobacter stain.

## 2023-10-06 NOTE — SUBJECTIVE & OBJECTIVE
Interval History:   10/6: No current AES plan for intervention    Review of Systems   Constitutional:  Positive for appetite change, chills and fever.   HENT:  Negative for sinus pressure and sore throat.    Eyes:  Negative for visual disturbance.   Respiratory:  Negative for cough and shortness of breath.    Cardiovascular:  Negative for chest pain and leg swelling.   Gastrointestinal:  Positive for abdominal pain, nausea and vomiting.   Genitourinary:  Negative for dysuria.   Musculoskeletal:  Negative for back pain.   Skin:  Negative for rash.   Neurological:  Positive for weakness.        Pre-syncope   Psychiatric/Behavioral:  Negative for confusion.      Objective:     Vital Signs (Most Recent):  Temp: 98.1 °F (36.7 °C) (10/06/23 1549)  Pulse: 86 (10/06/23 1549)  Resp: 14 (10/06/23 1549)  BP: 117/65 (10/06/23 1549)  SpO2: 97 % (10/06/23 1549) Vital Signs (24h Range):  Temp:  [98.1 °F (36.7 °C)-99.5 °F (37.5 °C)] 98.1 °F (36.7 °C)  Pulse:  [] 86  Resp:  [12-20] 14  SpO2:  [91 %-100 %] 97 %  BP: (117-187)/(65-89) 117/65     Weight: 72.1 kg (158 lb 15.2 oz)  Body mass index is 22.81 kg/m².    Intake/Output Summary (Last 24 hours) at 10/6/2023 1720  Last data filed at 10/6/2023 0232  Gross per 24 hour   Intake 1100 ml   Output 450 ml   Net 650 ml         Physical Exam  Vitals reviewed.   Constitutional:       General: He is not in acute distress.     Appearance: He is not ill-appearing.   Eyes:      General: No scleral icterus.  Cardiovascular:      Rate and Rhythm: Normal rate.   Pulmonary:      Effort: Pulmonary effort is normal. No respiratory distress.   Abdominal:      General: There is no distension.      Tenderness: There is abdominal tenderness. There is no guarding or rebound.   Skin:     Coloration: Skin is not jaundiced.   Neurological:      Mental Status: He is alert. Mental status is at baseline.             Significant Labs: All pertinent labs within the past 24 hours have been  reviewed.    Significant Imaging: I have reviewed all pertinent imaging results/findings within the past 24 hours.

## 2023-10-06 NOTE — ASSESSMENT & PLAN NOTE
Currently on Biktarvy, has been complaint with medication and receiving it while in patient. Last CD4 count on 9/20 was 296. Cause of pancreatitis more likely due to alcohol (positive peth at [facility]).   - Follow up with Dr. Herrera in clinic  - Continue Biktarvy

## 2023-10-06 NOTE — SUBJECTIVE & OBJECTIVE
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     Current Outpatient Medications on File Prior to Encounter   Medication Sig    grticncaq-btkpmuly-krjbfuy ala (BIKTARVY) -25 mg (25 kg or greater) Take 1 tablet by mouth once daily.    ciprofloxacin HCl (CIPRO) 500 MG tablet Take 500 mg by mouth every 12 (twelve) hours.    folic acid (FOLVITE) 1 MG tablet Take 1 tablet (1 mg total) by mouth once daily.    ibuprofen (ADVIL,MOTRIN) 400 MG tablet Take 400 mg by mouth every 4 (four) hours as needed (Inflammation).    metroNIDAZOLE (FLAGYL) 500 MG tablet Take 500 mg by mouth every 8 (eight) hours.    nicotine (NICODERM CQ) 14 mg/24 hr Place 1 patch onto the skin once daily.    oxyCODONE (ROXICODONE) 5 MG immediate release tablet Take 5 mg by mouth every 6 (six) hours as needed for Pain.    pantoprazole (PROTONIX) 40 MG tablet Take 1 tablet (40 mg total) by mouth 2 (two) times daily.    promethazine (PHENERGAN) 25 MG tablet Take 25 mg by mouth every 6 (six) hours as needed for Nausea/Vomiting.    ondansetron (ZOFRAN-ODT) 4 MG TbDL Take 1 tablet (4 mg total) by mouth every 6 (six) hours as needed (nausea).  (Patient not taking: Reported on 9/18/2023)     Family History       Problem Relation (Age of Onset)    Breast cancer Maternal Grandmother    Cancer Mother, Brother    No Known Problems Father    Ovarian cancer Mother    Pancreatitis Mother          Tobacco Use    Smoking status: Some Days     Types: Cigarettes    Smokeless tobacco: Never   Substance and Sexual Activity    Alcohol use: Yes    Drug use: Never    Sexual activity: Not on file     Review of Systems   Constitutional:  Positive for appetite change, chills and fever.   HENT:  Negative for sinus pressure and sore throat.    Eyes:  Negative for visual disturbance.   Respiratory:  Negative for cough and shortness of breath.    Cardiovascular:  Negative for chest pain and leg swelling.   Gastrointestinal:  Positive for abdominal pain, nausea and vomiting.   Genitourinary:  Negative for dysuria.   Musculoskeletal:  Negative for back pain.   Skin:  Negative for rash.   Neurological:  Positive for weakness.        Pre-syncope   Psychiatric/Behavioral:  Negative for confusion.      Objective:     Vital Signs (Most Recent):  Temp: 98.5 °F (36.9 °C) (10/06/23 0027)  Pulse: 109 (10/06/23 0027)  Resp: 18 (10/06/23 0239)  BP: 129/89 (10/06/23 0027)  SpO2: (!) 94 % (10/06/23 0032) Vital Signs (24h Range):  Temp:  [98 °F (36.7 °C)-99.5 °F (37.5 °C)] 98.5 °F (36.9 °C)  Pulse:  [104-133] 109  Resp:  [15-20] 18  SpO2:  [91 %-100 %] 94 %  BP: (117-187)/(70-89) 129/89     Weight: 72.1 kg (158 lb 15.2 oz)  Body mass index is 22.81 kg/m².     Physical Exam  Vitals and nursing note reviewed.   Constitutional:       General: He is not in acute distress.  HENT:      Head: Normocephalic and atraumatic.      Mouth/Throat:      Mouth: Mucous membranes are moist.      Pharynx: Oropharynx is clear. No oropharyngeal exudate or posterior oropharyngeal erythema.   Eyes:      General: No scleral icterus.     Extraocular Movements: Extraocular movements intact.      Conjunctiva/sclera:  "Conjunctivae normal.      Pupils: Pupils are equal, round, and reactive to light.   Cardiovascular:      Rate and Rhythm: Regular rhythm. Tachycardia present.      Pulses: Normal pulses.      Heart sounds: No murmur heard.  Pulmonary:      Effort: Pulmonary effort is normal. No respiratory distress.      Breath sounds: No wheezing or rales.   Abdominal:      General: Abdomen is flat.      Tenderness: There is abdominal tenderness.      Comments: TEnderness to mild palpation in the epigastrum and RUQ/RLQ, mild rebound tenderness, voluntary guarding with deeper palpation.    Musculoskeletal:      Cervical back: Neck supple.      Right lower leg: No edema.      Left lower leg: No edema.   Lymphadenopathy:      Cervical: No cervical adenopathy.   Skin:     General: Skin is warm and dry.   Neurological:      General: No focal deficit present.      Mental Status: He is alert.   Psychiatric:         Mood and Affect: Mood normal.         Behavior: Behavior normal.              CRANIAL NERVES     CN III, IV, VI   Pupils are equal, round, and reactive to light.       Significant Labs: All pertinent labs within the past 24 hours have been reviewed.  CBC:   Recent Labs   Lab 10/04/23  0409 10/05/23  1514   WBC 11.0 23.83*   HGB 10.2* 11.9*   HCT 30.8* 37.9*   PLT  --  761*     CMP:   Recent Labs   Lab 10/05/23  1514      K 3.6      CO2 22*   *   BUN 8   CREATININE 0.8   CALCIUM 9.8   PROT 8.4   ALBUMIN 3.4*   BILITOT 0.4   ALKPHOS 97   AST 24   ALT 11   ANIONGAP 14     Cardiac Markers: No results for input(s): "CKMB", "MYOGLOBIN", "BNP", "TROPISTAT" in the last 48 hours.  Coagulation: No results for input(s): "PT", "INR", "APTT" in the last 48 hours.  Lactic Acid:   Recent Labs   Lab 10/05/23  2243   LACTATE 0.6     Magnesium: No results for input(s): "MG" in the last 48 hours.  Troponin: No results for input(s): "TROPONINI", "TROPONINIHS" in the last 48 hours.  Urine Studies: No results for input(s): " ""COLORU", "APPEARANCEUA", "PHUR", "SPECGRAV", "PROTEINUA", "GLUCUA", "KETONESU", "BILIRUBINUA", "OCCULTUA", "NITRITE", "UROBILINOGEN", "LEUKOCYTESUR", "RBCUA", "WBCUA", "BACTERIA", "SQUAMEPITHEL", "HYALINECASTS" in the last 48 hours.    Invalid input(s): "WRIGHTSUR"    Significant Imaging: I have reviewed all pertinent imaging results/findings within the past 24 hours.    [facility] [place] CT ABDOMEN PELVIS WITH CONTRAST     REASON FOR STUDY: Abdominal abscess/infection suspected     TECHNIQUE:   A01 -- Contiguous 2.5 mm axial images from the lung bases through the upper thighs were obtained 65 seconds and 3 minutes after intravenous administration of contrast. Coronal and sagittal reformatted images were obtained utilizing post processing techniques at a separate workstation.     CONTRAST: IOHEXOL 350 MG IODINE/ML INTRAVENOUS SOLUTION:100mL     RADIATION DOSE: 678.8 (mGy.cm)     This CT utilized automated exposure control and/or adjustment of the mA according to patient size and/or iterative reconstruction technique(s).     COMPARISON: None.     FINDINGS:     LOWER THORAX: Bandlike opacities in the left lung base may represent subsegmental atelectasis.   GREAT ARTERIES: No aortic dissection or aneurysm.   GREAT VEINS: There is a small filling defect within the superior mesenteric vein (series 202, image 62) and nonocclusive thrombus in the splenic vein (series 202, images 48-56).   SPINAL CANAL: No stenosis.   LIVER: Heterogeneous attenuation of the liver may be secondary to transient differences in attenuation although hepatitis and/or fatty infiltration cannot be ruled out.   BILIARY SYSTEM: The gallbladder is within normal limits. No intrahepatic ductal dilatation.   PANCREAS: There is a large communicating peripancreatic collection engulfing, compressing, and replacing the pancreas. This collection measures at least 14.1 x 7.2 x 6.1 cm (series 206, image 47; series 202, image 71).   SPLEEN: Nonocclusive splenic vein " thrombus. No parenchymal abnormality.   ADRENAL GLANDS: The adrenal glands are symmetric bilaterally.   KIDNEYS & URETERS: Normal   BLADDER: The urinary bladder is within normal limits.   REPRODUCTIVE ORGANS: Within normal limits.   GI TRACT, MESENTERIES, & LIGAMENTS: No focal bowel wall thickening or enhancement. No bowel obstruction.   ...APPENDIX: Within normal limits.   PERITONEUM & PERITONEAL SPACE: There is minimal intraperitoneal free fluid.   RETROPERITONEUM & EXTRAPERITONEUM: Within normal limits.   BODY WALL & MUSCULATURE: No significant soft tissue edema or abnormal fluid collection.   BONES & JOINTS: No acute fracture or dislocation.   Exam End: 09/29/23 23:32    Specimen Collected: 09/29/23 23:44        CT ABDOMEN PELVIS WITH CONTRAST     CLINICAL HISTORY:  Nausea/vomiting;Abdominal pain, acute, nonlocalized;     TECHNIQUE:  Axial images of the abdomen and pelvis were acquired after the use of 75 coronal and sagittal reconstructions were also obtained     COMPARISON:  CT 09/22/2023, 09/18/2023, 12/07/2021     FINDINGS:  Images of the lower thorax are remarkable for a small left pleural effusion with associated compressive atelectasis of the left lower lobe.     Heart: No pericardial effusion.     Esophagus: Distal esophagus is patulous.     Stomach and duodenum: Linear focus of hyperdense material within the stomach, which may be related to ingested material.     Liver: Hepatomegaly measuring 22 cm in craniocaudal dimension.  Hepatic steatosis with geographic region of fat deposition..     Gallbladder: No calcified gallstones.     Bile Ducts: No evidence of dilated ducts.     Spleen: Splenomegaly.     Pancreas: Interval development of peripancreatic fluid collections about the tail and body, which may be contiguous with one another.  The largest inferior component of this collection extends anteriorly and measures 7.4 x 6.5 cm (2-76) and the largest superior component measures 6.5 x 3.3 cm (2-43).   These collections appear partially encapsulated and suggest pseudocysts.     Adrenals: Unremarkable.     Kidneys/Ureters: The kidneys enhance symmetrically without hydronephrosis or nephrolithiasis.  The bilateral ureters are unremarkable without calculi seen.  The urinary bladder is nondistended noting there may be residual wall thickening.     Reproductive organs: The prostate and scrotal content are unremarkable.     Bowel/Mesentery: There are several scattered perihepatic varices.  No evidence of inflammation or wall thickening.  Colon demonstrates no focal wall thickening.  Appendix is unremarkable.     Peritoneum: Scattered fluid through the pelvis, bilateral pericolic gutters, and right upper quadrant adjacent to the liver, which is decreased in volume compared to prior CT.  There is no free air.     Lymph nodes: Prominent lymph nodes through the upper abdomen and retroperitoneum, which may be reactive.     Abdominal wall:  There is bilateral gynecomastia.     Vasculature: The portal vein, proximal splenic vein, SMV, celiac axis and SMA all are patent.  The splenic vein is compressed/thrombosed, similar in appearance to the previous examination noting several gastric and mesenteric collaterals.     Bones: No acute fracture.     Impression:     Interval development of multilobulated peripancreatic fluid collections adjacent to the pancreatic tail and body is detailed above.  These collections appear inter communicate with one another.  Findings suggests pseudo cysts, no air to suggest infected collection though infected content is not excluded.  Correlation and follow-up is advised.     Small left pleural effusion, similar to CT 09/20/2023.     Continued thrombosis of the splenic vein noting perigastric collaterals.     Geographic hepatic steatosis, correlation with LFTs recommended.     Please see above for several additional findings.     This report was flagged in Epic as abnormal.     Electronically signed  by resident: Sally Mcguire  Date:                                            10/05/2023  Time:                                           17:08     Electronically signed by: Juan Sexton MD  Date:                                            10/05/2023  Time:                                           17:44

## 2023-10-06 NOTE — SUBJECTIVE & OBJECTIVE
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       Past Surgical History:   Procedure Laterality Date    ESOPHAGOGASTRODUODENOSCOPY N/A 9/18/2023    Procedure: EGD (ESOPHAGOGASTRODUODENOSCOPY);  Surgeon: Kg Cleaning MD;  Location: [address]);  Service: Endoscopy;  Laterality: N/A;       Review of patient's allergies indicates:   Allergen Reactions    [place] Swelling    Pcn [penicillins] Hives    Pecan nut Swelling    Soy     Sulfa (sulfonamide antibiotics) Hives       Medications:  Medications Prior to Admission   Medication Sig    nqsknvbqi-enakbmfv-lvxgvfe ala (BIKTARVY) -25 mg (25 kg or greater) Take 1 tablet by mouth once daily.    ciprofloxacin HCl (CIPRO) 500 MG tablet Take 500 mg by mouth every 12 (twelve) hours.    folic acid (FOLVITE) 1 MG tablet Take 1 tablet (1 mg total) by mouth once daily.    ibuprofen (ADVIL,MOTRIN) 400 MG tablet Take 400 mg by mouth every 4 (four) hours as needed (Inflammation).    metroNIDAZOLE (FLAGYL) 500 MG tablet Take 500 mg by mouth every 8 (eight) hours.    nicotine (NICODERM CQ) 14 mg/24 hr Place 1 patch onto the skin once daily.    oxyCODONE (ROXICODONE) 5 MG immediate release tablet Take 5 mg by mouth every 6 (six) hours as needed for Pain.    pantoprazole (PROTONIX) 40 MG tablet Take 1 tablet (40 mg total) by mouth 2 (two) times daily.    promethazine (PHENERGAN) 25 MG tablet Take 25 mg by mouth every 6 (six) hours as needed for Nausea/Vomiting.     Antibiotics (From admission, onward)      Start     Stop Route Frequency Ordered    10/05/23 2115  meropenem (MERREM) 2 g in sodium chloride 0.9% 100 mL IVPB         -- IV Every 8 hours (non-standard  times) 10/05/23 2006          Antifungals (From admission, onward)      None          Antivirals (From admission, onward)          Stop Route Frequency     jacfkggxc-pemdrzie-vkvkmcm ala         -- Oral Daily             Immunization History   Administered Date(s) Administered    COVID-19, MRNA, LN-S, PF (MODERNA FULL 0.5 ML DOSE) 10/08/2021    Hepatitis B (recombinant) Adjuvanted, 2 dose 06/16/2022, 09/20/2022    Pneumococcal Conjugate - 20 Valent 06/16/2022    Tdap 08/17/2019    meningococcal Conjugate (MCV4O) 06/16/2022       Family History       Problem Relation (Age of Onset)    Breast cancer Maternal Grandmother    Cancer Mother, Brother    No Known Problems Father    Ovarian cancer Mother    Pancreatitis Mother          Social History     Socioeconomic History    Marital status: Single   Tobacco Use    Smoking status: Some Days     Types: Cigarettes    Smokeless tobacco: Never   Substance and Sexual Activity    Alcohol use: Yes    Drug use: Never     Social Determinants of Health     Financial Resource Strain: Low Risk  (9/18/2023)    Overall Financial Resource Strain (CARDIA)     Difficulty of Paying Living Expenses: Not hard at all   Food Insecurity: No Food Insecurity (9/18/2023)    Hunger Vital Sign     Worried About Running Out of Food in the Last Year: Never true     Ran Out of Food in the Last Year: Never true   Transportation Needs: No Transportation Needs (9/18/2023)    PRAPARE - Transportation     Lack of Transportation (Medical): No     Lack of Transportation (Non-Medical): No   Physical Activity: Inactive (9/18/2023)    Exercise Vital Sign     Days of Exercise per Week: 0 days     Minutes of Exercise per Session: 0 min   Stress: Stress Concern Present (9/18/2023)    [employer] [place] [employer] - Occupational Stress Questionnaire     Feeling of Stress : To some extent   Social Connections: Socially Isolated (9/18/2023)    Social Connection and Isolation Panel [NHANES]     Frequency of  Communication with Friends and Family: More than three times a week     Frequency of Social Gatherings with Friends and Family: Once a week     Attends Sabianism Services: Never     Active Member of Clubs or Organizations: No     Attends Club or Organization Meetings: Never     Marital Status: Never    Housing Stability: Low Risk  (9/18/2023)    Housing Stability Vital Sign     Unable to Pay for Housing in the Last Year: No     Number of Places Lived in the Last Year: 1     Unstable Housing in the Last Year: No     Review of Systems   Constitutional:  Negative for chills, fatigue and fever.   HENT:  Negative for congestion and sore throat.    Respiratory:  Negative for cough and shortness of breath.    Cardiovascular:  Negative for chest pain.   Gastrointestinal:  Positive for abdominal pain, diarrhea and nausea. Negative for constipation and vomiting.   Genitourinary:  Negative for dysuria and flank pain.   Musculoskeletal:  Negative for joint swelling.   Neurological:  Negative for dizziness and headaches.   Psychiatric/Behavioral:  Negative for confusion. The patient is not nervous/anxious.      Objective:     Vital Signs (Most Recent):  Temp: 98.5 °F (36.9 °C) (10/06/23 0838)  Pulse: 107 (10/06/23 0838)  Resp: 20 (10/06/23 1041)  BP: 120/78 (10/06/23 0838)  SpO2: 95 % (10/06/23 0838) Vital Signs (24h Range):  Temp:  [98 °F (36.7 °C)-99.5 °F (37.5 °C)] 98.5 °F (36.9 °C)  Pulse:  [101-133] 107  Resp:  [15-20] 20  SpO2:  [91 %-100 %] 95 %  BP: (117-187)/(70-89) 120/78     Weight: 72.1 kg (158 lb 15.2 oz)  Body mass index is 22.81 kg/m².    Estimated Creatinine Clearance: 193.6 mL/min (based on SCr of 0.6 mg/dL).     Physical Exam  HENT:      Head: Normocephalic.      Nose: Nose normal.      Mouth/Throat:      Mouth: Mucous membranes are moist.   Eyes:      Pupils: Pupils are equal, round, and reactive to light.   Cardiovascular:      Rate and Rhythm: Normal rate.   Pulmonary:      Effort: Pulmonary effort is  normal.   Abdominal:      General: Abdomen is flat.      Tenderness: There is abdominal tenderness (Severe right sided tenderness to palpation).   Musculoskeletal:         General: Normal range of motion.      Right lower leg: No edema.      Left lower leg: No edema.      Comments: No calf tenderness   Skin:     General: Skin is warm.   Neurological:      General: No focal deficit present.      Mental Status: He is alert and oriented to person, place, and time.   Psychiatric:         Mood and Affect: Mood normal.          Significant Labs: All pertinent labs within the past 24 hours have been reviewed.    Significant Imaging: I have reviewed all pertinent imaging results/findings within the past 24 hours.

## 2023-10-06 NOTE — ASSESSMENT & PLAN NOTE
Patient with necrotizing pancreatitis and now sequelae of large pseudocyst concerning for infection  -Admitted to [facility] from 9/29-10/04 - received empiric Meropenem until 10/02 then transitioned to Cirpo/Flagyl, on 10/4 WBC was 11 - 1 day from discharge patient presents for readmission and WBC 23.   -BLood cultures obtained   -Empiric Meropenem reordered  Infectious disease consult ordered for restricted abx use  -AES consult ordered - evaluate new imaging - is fluid collection/?abscess amenable to endoscopic drainage or should  Percutaneous aspiration vs drainage be pursued  For source control/cultures.     Continue IV antiemetics, lower rate IV fluids, IV opioid pain medications - increasing to 2mg IV dilaudid PRn for severe,  1mg IV prn for moderate pain.     Glucose is 150s - check A1c in AM, pt potentially at high risk to develop diabetes due to destruction from severity of pancreatitis.     Keep NPO except sips - given repeat admissions - patient may require low rate tube feed enteral nutrition vs parenteral nutrition - discuss with GI.

## 2023-10-06 NOTE — PROGRESS NOTES
[facility] - Transplant [facility] Medicine  Progress Note    Patient Name: Tasia Cardona  MRN: [id number]  Patient Class: IP- Inpatient   Admission Date: 10/5/2023  Length of Stay: 1 days  Attending Physician: Brigida Barrow MD  Primary Care Provider: Pina Jones NP        Subjective:     Principal Problem:Acute necrotizing pancreatitis        HPI:  23-year-old man with a history of HIV on HAART, asthma, polycythemia presents to the emergency department with complaints of uncontrolled abdominal pain diagnosis of necrotizing pancreatitis.    Recent history is that patient was admitted to [facility] [phone], presented with concerns for hematemesis Chauhan tear, post EGD he developed worsening abdominal pain, CT imaging obtained in consistent with acute necrotizing pancreatitis with splenic vein thrombosis.  Hematology consulted and anticoagulation not recommended for splenic vein thrombosis, he received 2 days of empiric IV antibiotic therapy with vancomycin and cefepime, Infectious Disease was consulted.  Prior to discharge patient was tolerating oral diet    He was then admitted to [facility] on September 29th to October 4th.  Presenting symptoms were uncontrolled abdominal pain and fevers CT scan demonstrated sequela of necrotizing pancreatitis with large encapsulating peripancreatic fluid collection.  He was started on empiric meropenem until October 2nd transition to oral ciprofloxacin and Flagyl and discharged with with oral antibiotic prescription.  Patient reported pain persisted he was discharged with oral oxycodone for pain management.    He went home on October 4th approximately around 3:00 p.m. drink only some liquids that evening and the morning of October 5th he had a smoothie, eggs and cinnamon toast for breakfast took a nap around 11:00 a.m. and woke up at 1:00 p.m. with severe abdominal pain in the epigastrium and upper quadrants.  He states while taking shower he felt  presyncopal did not fall and sat on the ground, decided to return to the hospital felt he may have been discharged too soon do [facility] hospitalization patient had PETH checked - value @ 682, suspected to be etiology for pancreatitis.  Today patient says he has had no alcohol use since prior to initial September hospitalization.   He smokes cigarettes approx 1 pack every 2-3/weeks.     With repeat vomiting today he did not have any hematemesis.   At discharge on October 4th white count was 11 and today is elevated at 23.  Pain was so severe in the emergency department that he required ketamine 20mg dose to help improve symptoms.     ED Treatment; Cefepime,  Droperidol, LR 1L, NS 1L, Morphine 4mg x 2, Flagyl 500mg, Zofran.   Ketamine 20mg.       Overview/Hospital Course:  No notes on file    Interval History:   10/6: No current AES plan for intervention    Review of Systems   Constitutional:  Positive for appetite change, chills and fever.   HENT:  Negative for sinus pressure and sore throat.    Eyes:  Negative for visual disturbance.   Respiratory:  Negative for cough and shortness of breath.    Cardiovascular:  Negative for chest pain and leg swelling.   Gastrointestinal:  Positive for abdominal pain, nausea and vomiting.   Genitourinary:  Negative for dysuria.   Musculoskeletal:  Negative for back pain.   Skin:  Negative for rash.   Neurological:  Positive for weakness.        Pre-syncope   Psychiatric/Behavioral:  Negative for confusion.      Objective:     Vital Signs (Most Recent):  Temp: 98.1 °F (36.7 °C) (10/06/23 1549)  Pulse: 86 (10/06/23 1549)  Resp: 14 (10/06/23 1549)  BP: 117/65 (10/06/23 1549)  SpO2: 97 % (10/06/23 1549) Vital Signs (24h Range):  Temp:  [98.1 °F (36.7 °C)-99.5 °F (37.5 °C)] 98.1 °F (36.7 °C)  Pulse:  [] 86  Resp:  [12-20] 14  SpO2:  [91 %-100 %] 97 %  BP: (117-187)/(65-89) 117/65     Weight: 72.1 kg (158 lb 15.2 oz)  Body mass index is 22.81 kg/m².    Intake/Output Summary (Last 24  hours) at 10/6/2023 1720  Last data filed at 10/6/2023 0232  Gross per 24 hour   Intake 1100 ml   Output 450 ml   Net 650 ml         Physical Exam  Vitals reviewed.   Constitutional:       General: He is not in acute distress.     Appearance: He is not ill-appearing.   Eyes:      General: No scleral icterus.  Cardiovascular:      Rate and Rhythm: Normal rate.   Pulmonary:      Effort: Pulmonary effort is normal. No respiratory distress.   Abdominal:      General: There is no distension.      Tenderness: There is abdominal tenderness. There is no guarding or rebound.   Skin:     Coloration: Skin is not jaundiced.   Neurological:      Mental Status: He is alert. Mental status is at baseline.             Significant Labs: All pertinent labs within the past 24 hours have been reviewed.    Significant Imaging: I have reviewed all pertinent imaging results/findings within the past 24 hours.      Assessment/Plan:      * Acute necrotizing pancreatitis  Patient with necrotizing pancreatitis and now sequelae of large pseudocyst concerning for infection  -Admitted to [facility] from 9/29-10/04 - received empiric Meropenem until 10/02 then transitioned to Cirpo/Flagyl, on 10/4 WBC was 11 - 1 day from discharge patient presents for readmission and WBC 23.   -BLood cultures obtained   -Empiric Meropenem reordered  Infectious disease consult ordered for restricted abx use  -AES consult ordered - evaluate new imaging - is fluid collection/?abscess amenable to endoscopic drainage or should  Percutaneous aspiration vs drainage be pursued  For source control/cultures.     Continue IV antiemetics, lower rate IV fluids, IV opioid pain medications - increasing to 2mg IV dilaudid PRn for severe,  1mg IV prn for moderate pain.     Glucose is 150s - check A1c in AM, pt potentially at high risk to develop diabetes due to destruction from severity of pancreatitis.     Keep NPO except sips - given repeat admissions - patient may require low rate tube  "feed enteral nutrition vs parenteral nutrition - discuss with GI.     Alcohol use disorder  Reported in [facility] record patient with PETH level elevated 680s.  He reports last alcohol use is prior to initial admission with pancreatitis 9/17.   -      Pancreatic abscess  As per primary problem.       Splenic vein thrombosis  Complication from necrotizing pancreatitis - not on therapeutic anticoagulation  Continue DVT ppx dose enoxaparin      Corinne-Chauhan tear  Present during 9/17 admission s/p EGD  With recurrent vomiting today he denies any hematemsis  Continue PPI   -Recent EGD Pathology results show   A. DUODENUM, BIOPSY:   - Duodenal mucosa with focal gastric metaplasia and focal superficial erosion, see comment.   - No evidence of infection is identified.   - No evidence of villous architectural distortion, increased intraepithelial lymphocytes, active inflammation, granulomas or malignancy.   - Multiple levels examined.     B. STOMACH, ANTRUM, BIOPSY:   - Antral mucosa with changes suggestive of reactive (chemical) gastropathy).   - No Helicobacter-type organisms identified on the *Helicobacter stain.         Hepatitis C antibody positive in blood  HCV AB positive.  HCV viral load negative  Refer to hepatology as outpatient.     Polycythemia  Chronic - stable - follows with hematology as outpatient - labs show anemia at this time.       HIV infection  This patient in known to have HIV+ status (Have detected HIV PCR but never CD4 <200 or AIDS defining illness). Labs reviewed- No results found for: "CD4", No results found for: "HIVDNAPCR". Patient is on HAART. Will continue HAART. Prophylaxis was not indicated at this time- . Continue to monitor routine labs. Last CD4 count was   Lab Results   Component Value Date    ABSOLUTECD4 296 (L) 09/20/2023       We will consult Infectious disease at this time. Evaluate and treat HIV-associated diseases as indicated by specific problems listed below.       VTE Risk Mitigation " (From admission, onward)         Ordered     enoxaparin injection 40 mg  Daily         10/06/23 0124     IP VTE HIGH RISK PATIENT  Once         10/06/23 0124     Place sequential compression device  Until discontinued         10/06/23 0124                Discharge Planning   CASTILLO: 10/9/2023     Code Status: Full Code   Is the patient medically ready for discharge?: No    Reason for patient still in hospital (select all that apply): Patient trending condition  Discharge Plan A: Home                  Brigida Barrow MD  Department of Hospital Medicine   [facility] - Transplant Stepdown

## 2023-10-06 NOTE — HPI
Mr. Cardona is a 23 year old male with a history of with a history of HIV on Biktarvy, Asthma, polycythemia who presents with worsening abdominal pain since discharge from [facility] on 10/4/23. Patient had recent admission on 9/17 for Corinne-Chauhan tear, hematemesis. EGD was performed at that visit after which he developed acute pancreatitis with splenic vein thrombosis. He was treated inpatient with vanc/cefepime and discharged once tolerating diet. He was admitted to [facility] on 9/29 for intractable abdominal pain, CT at that time was showing large encapsulating peripancreatic fluid collection. He was treated w/ meropenem and discharged w/ Cipro/Flagyl w/ end date of 10/5. Patient returned on 10/5 again with worsening abdominal pain. CT abd/pelvis showed development of multi lobulated peripancreatic fluid collections appearing to communicate with one another. Labs significant for wbc of 23.83.  He was treated with cefepime/flagyl, morphine, ketamine in the ED and admitted. He is currently continued on meropenem. Patient denies any alcohol use since episode of pancreatitis, has been complaint with his Biktarvy. Establishing care with Dr. Herrera for HIV management.

## 2023-10-06 NOTE — HPI
23-year-old man with a history of HIV on HAART, asthma, polycythemia presents to the emergency department with complaints of uncontrolled abdominal pain diagnosis of necrotizing pancreatitis.    Recent history is that patient was admitted to [facility] [phone], presented with concerns for hematemesis Chauhan tear, post EGD he developed worsening abdominal pain, CT imaging obtained in consistent with acute necrotizing pancreatitis with splenic vein thrombosis.  Hematology consulted and anticoagulation not recommended for splenic vein thrombosis, he received 2 days of empiric IV antibiotic therapy with vancomycin and cefepime, Infectious Disease was consulted.  Prior to discharge patient was tolerating oral diet    He was then admitted to [facility] on September 29th to October 4th.  Presenting symptoms were uncontrolled abdominal pain and fevers CT scan demonstrated sequela of necrotizing pancreatitis with large encapsulating peripancreatic fluid collection.  He was started on empiric meropenem until October 2nd transition to oral ciprofloxacin and Flagyl and discharged with with oral antibiotic prescription.  Patient reported pain persisted he was discharged with oral oxycodone for pain management.    He went home on October 4th approximately around 3:00 p.m. drink only some liquids that evening and the morning of October 5th he had a smoothie, eggs and cinnamon toast for breakfast took a nap around 11:00 a.m. and woke up at 1:00 p.m. with severe abdominal pain in the epigastrium and upper quadrants.  He states while taking shower he felt presyncopal did not fall and sat on the ground, decided to return to the hospital felt he may have been discharged too soon do [facility] hospitalization patient had PETH checked - value @ 682, suspected to be etiology for pancreatitis.  Today patient says he has had no alcohol use since prior to initial September hospitalization.   He smokes cigarettes approx 1 pack every 2-3/weeks.      With repeat vomiting today he did not have any hematemesis.   At discharge on October 4th white count was 11 and today is elevated at 23.  Pain was so severe in the emergency department that he required ketamine 20mg dose to help improve symptoms.     ED Treatment; Cefepime,  Droperidol, LR 1L, NS 1L, Morphine 4mg x 2, Flagyl 500mg, Zofran.   Ketamine 20mg.

## 2023-10-07 LAB
ALBUMIN SERPL BCP-MCNC: 2.6 G/DL (ref 3.5–5.2)
ALP SERPL-CCNC: 67 U/L (ref 55–135)
ALT SERPL W/O P-5'-P-CCNC: 6 U/L (ref 10–44)
ANION GAP SERPL CALC-SCNC: 11 MMOL/L (ref 8–16)
AST SERPL-CCNC: 16 U/L (ref 10–40)
BASOPHILS # BLD AUTO: 0.04 K/UL (ref 0–0.2)
BASOPHILS NFR BLD: 0.6 % (ref 0–1.9)
BILIRUB SERPL-MCNC: 0.4 MG/DL (ref 0.1–1)
BUN SERPL-MCNC: 3 MG/DL (ref 6–20)
CALCIUM SERPL-MCNC: 8.8 MG/DL (ref 8.7–10.5)
CHLORIDE SERPL-SCNC: 101 MMOL/L (ref 95–110)
CO2 SERPL-SCNC: 24 MMOL/L (ref 23–29)
CREAT SERPL-MCNC: 0.6 MG/DL (ref 0.5–1.4)
DIFFERENTIAL METHOD: ABNORMAL
EOSINOPHIL # BLD AUTO: 0.2 K/UL (ref 0–0.5)
EOSINOPHIL NFR BLD: 3.2 % (ref 0–8)
ERYTHROCYTE [DISTWIDTH] IN BLOOD BY AUTOMATED COUNT: 19.7 % (ref 11.5–14.5)
EST. GFR  (NO RACE VARIABLE): >60 ML/MIN/1.73 M^2
GLUCOSE SERPL-MCNC: 82 MG/DL (ref 70–110)
HCT VFR BLD AUTO: 29.8 % (ref 40–54)
HGB BLD-MCNC: 9.5 G/DL (ref 14–18)
IMM GRANULOCYTES # BLD AUTO: 0.04 K/UL (ref 0–0.04)
IMM GRANULOCYTES NFR BLD AUTO: 0.6 % (ref 0–0.5)
LYMPHOCYTES # BLD AUTO: 1.7 K/UL (ref 1–4.8)
LYMPHOCYTES NFR BLD: 26.8 % (ref 18–48)
MAGNESIUM SERPL-MCNC: 1.8 MG/DL (ref 1.6–2.6)
MCH RBC QN AUTO: 29 PG (ref 27–31)
MCHC RBC AUTO-ENTMCNC: 31.9 G/DL (ref 32–36)
MCV RBC AUTO: 91 FL (ref 82–98)
MONOCYTES # BLD AUTO: 0.6 K/UL (ref 0.3–1)
MONOCYTES NFR BLD: 9.4 % (ref 4–15)
NEUTROPHILS # BLD AUTO: 3.7 K/UL (ref 1.8–7.7)
NEUTROPHILS NFR BLD: 59.4 % (ref 38–73)
NRBC BLD-RTO: 0 /100 WBC
PHOSPHATE SERPL-MCNC: 3.6 MG/DL (ref 2.7–4.5)
PLATELET # BLD AUTO: 417 K/UL (ref 150–450)
PLATELET BLD QL SMEAR: ABNORMAL
PMV BLD AUTO: 9.6 FL (ref 9.2–12.9)
POCT GLUCOSE: 106 MG/DL (ref 70–110)
POCT GLUCOSE: 95 MG/DL (ref 70–110)
POTASSIUM SERPL-SCNC: 3.8 MMOL/L (ref 3.5–5.1)
PROT SERPL-MCNC: 6.4 G/DL (ref 6–8.4)
RBC # BLD AUTO: 3.28 M/UL (ref 4.6–6.2)
SMUDGE CELLS BLD QL SMEAR: PRESENT
SODIUM SERPL-SCNC: 136 MMOL/L (ref 136–145)
WBC # BLD AUTO: 6.27 K/UL (ref 3.9–12.7)

## 2023-10-07 PROCEDURE — 80053 COMPREHEN METABOLIC PANEL: CPT | Performed by: HOSPITALIST

## 2023-10-07 PROCEDURE — 85025 COMPLETE CBC W/AUTO DIFF WBC: CPT | Performed by: HOSPITALIST

## 2023-10-07 PROCEDURE — 84100 ASSAY OF PHOSPHORUS: CPT | Performed by: HOSPITALIST

## 2023-10-07 PROCEDURE — 99233 PR SUBSEQUENT HOSPITAL CARE,LEVL III: ICD-10-PCS | Mod: ,,, | Performed by: HOSPITALIST

## 2023-10-07 PROCEDURE — 20600001 HC STEP DOWN PRIVATE ROOM

## 2023-10-07 PROCEDURE — 99233 SBSQ HOSP IP/OBS HIGH 50: CPT | Mod: ,,, | Performed by: HOSPITALIST

## 2023-10-07 PROCEDURE — 25000003 PHARM REV CODE 250: Performed by: HOSPITALIST

## 2023-10-07 PROCEDURE — 36415 COLL VENOUS BLD VENIPUNCTURE: CPT | Performed by: HOSPITALIST

## 2023-10-07 PROCEDURE — 83735 ASSAY OF MAGNESIUM: CPT | Performed by: HOSPITALIST

## 2023-10-07 PROCEDURE — 63600175 PHARM REV CODE 636 W HCPCS: Performed by: HOSPITALIST

## 2023-10-07 PROCEDURE — 99233 SBSQ HOSP IP/OBS HIGH 50: CPT | Mod: ,,, | Performed by: INTERNAL MEDICINE

## 2023-10-07 PROCEDURE — 99233 PR SUBSEQUENT HOSPITAL CARE,LEVL III: ICD-10-PCS | Mod: ,,, | Performed by: INTERNAL MEDICINE

## 2023-10-07 RX ADMIN — HYDROMORPHONE HYDROCHLORIDE 2 MG: 1 INJECTION, SOLUTION INTRAMUSCULAR; INTRAVENOUS; SUBCUTANEOUS at 06:10

## 2023-10-07 RX ADMIN — PROCHLORPERAZINE EDISYLATE 5 MG: 5 INJECTION INTRAMUSCULAR; INTRAVENOUS at 11:10

## 2023-10-07 RX ADMIN — SENNOSIDES AND DOCUSATE SODIUM 1 TABLET: 50; 8.6 TABLET ORAL at 08:10

## 2023-10-07 RX ADMIN — HYDROMORPHONE HYDROCHLORIDE 2 MG: 1 INJECTION, SOLUTION INTRAMUSCULAR; INTRAVENOUS; SUBCUTANEOUS at 10:10

## 2023-10-07 RX ADMIN — Medication 6 MG: at 11:10

## 2023-10-07 RX ADMIN — PANTOPRAZOLE SODIUM 40 MG: 40 TABLET, DELAYED RELEASE ORAL at 08:10

## 2023-10-07 RX ADMIN — HYDROMORPHONE HYDROCHLORIDE 2 MG: 1 INJECTION, SOLUTION INTRAMUSCULAR; INTRAVENOUS; SUBCUTANEOUS at 02:10

## 2023-10-07 RX ADMIN — ENOXAPARIN SODIUM 40 MG: 40 INJECTION SUBCUTANEOUS at 05:10

## 2023-10-07 RX ADMIN — FOLIC ACID 1 MG: 1 TABLET ORAL at 08:10

## 2023-10-07 RX ADMIN — PROCHLORPERAZINE EDISYLATE 5 MG: 5 INJECTION INTRAMUSCULAR; INTRAVENOUS at 08:10

## 2023-10-07 RX ADMIN — CEFEPIME 2 G: 2 INJECTION, POWDER, FOR SOLUTION INTRAVENOUS at 06:10

## 2023-10-07 RX ADMIN — BICTEGRAVIR SODIUM, EMTRICITABINE, AND TENOFOVIR ALAFENAMIDE FUMARATE 1 TABLET: 50; 200; 25 TABLET ORAL at 08:10

## 2023-10-07 NOTE — SUBJECTIVE & OBJECTIVE
Interval History:   10/7: advance diet today, patient aware of need for IV antibiotics.       Review of Systems   Constitutional:  Positive for appetite change, chills and fever.   HENT:  Negative for sinus pressure and sore throat.    Eyes:  Negative for visual disturbance.   Respiratory:  Negative for cough and shortness of breath.    Cardiovascular:  Negative for chest pain and leg swelling.   Gastrointestinal:  Positive for abdominal pain, nausea and vomiting.   Genitourinary:  Negative for dysuria.   Musculoskeletal:  Negative for back pain.   Skin:  Negative for rash.   Neurological:  Positive for weakness.        Pre-syncope   Psychiatric/Behavioral:  Negative for confusion.      Objective:     Vital Signs (Most Recent):  Temp: 98.5 °F (36.9 °C) (10/07/23 1146)  Pulse: 83 (10/07/23 1146)  Resp: 20 (10/07/23 1146)  BP: 124/73 (10/07/23 1146)  SpO2: 97 % (10/07/23 1146) Vital Signs (24h Range):  Temp:  [97.8 °F (36.6 °C)-99 °F (37.2 °C)] 98.5 °F (36.9 °C)  Pulse:  [] 83  Resp:  [14-20] 20  SpO2:  [96 %-99 %] 97 %  BP: (112-124)/(61-73) 124/73     Weight: 72.1 kg (158 lb 15.2 oz)  Body mass index is 22.81 kg/m².    Intake/Output Summary (Last 24 hours) at 10/7/2023 1220  Last data filed at 10/7/2023 0424  Gross per 24 hour   Intake 1300 ml   Output --   Net 1300 ml         Physical Exam  Vitals reviewed.   Constitutional:       General: He is not in acute distress.     Appearance: He is not ill-appearing.   Eyes:      General: No scleral icterus.  Cardiovascular:      Rate and Rhythm: Normal rate.   Pulmonary:      Effort: Pulmonary effort is normal. No respiratory distress.   Abdominal:      General: There is no distension.      Tenderness: There is abdominal tenderness. There is no guarding or rebound.   Skin:     Coloration: Skin is not jaundiced.   Neurological:      Mental Status: He is alert. Mental status is at baseline.             Significant Labs: All pertinent labs within the past 24 hours have  been reviewed.    Significant Imaging: I have reviewed all pertinent imaging results/findings within the past 24 hours.

## 2023-10-07 NOTE — PLAN OF CARE
Patient AAO x4. VSS. On room air. Afebrile. LR d/c this shift. Cont clear liquid diet. Cefepime continued. PRN pain medication given. Pt up and ambulatory. Bed locked/lowest settings. Call bell in reach. Reminded to call for assistance.

## 2023-10-07 NOTE — PROGRESS NOTES
[facility] - Transplant [facility] Medicine  Progress Note    Patient Name: Tasia Cardona  MRN: [id number]  Patient Class: IP- Inpatient   Admission Date: 10/5/2023  Length of Stay: 2 days  Attending Physician: Brigida Barrow MD  Primary Care Provider: Pina Jones NP        Subjective:     Principal Problem:Acute necrotizing pancreatitis        HPI:  23-year-old man with a history of HIV on HAART, asthma, polycythemia presents to the emergency department with complaints of uncontrolled abdominal pain diagnosis of necrotizing pancreatitis.    Recent history is that patient was admitted to [facility] [phone], presented with concerns for hematemesis Chauhan tear, post EGD he developed worsening abdominal pain, CT imaging obtained in consistent with acute necrotizing pancreatitis with splenic vein thrombosis.  Hematology consulted and anticoagulation not recommended for splenic vein thrombosis, he received 2 days of empiric IV antibiotic therapy with vancomycin and cefepime, Infectious Disease was consulted.  Prior to discharge patient was tolerating oral diet    He was then admitted to [facility] on September 29th to October 4th.  Presenting symptoms were uncontrolled abdominal pain and fevers CT scan demonstrated sequela of necrotizing pancreatitis with large encapsulating peripancreatic fluid collection.  He was started on empiric meropenem until October 2nd transition to oral ciprofloxacin and Flagyl and discharged with with oral antibiotic prescription.  Patient reported pain persisted he was discharged with oral oxycodone for pain management.    He went home on October 4th approximately around 3:00 p.m. drink only some liquids that evening and the morning of October 5th he had a smoothie, eggs and cinnamon toast for breakfast took a nap around 11:00 a.m. and woke up at 1:00 p.m. with severe abdominal pain in the epigastrium and upper quadrants.  He states while taking shower he felt  presyncopal did not fall and sat on the ground, decided to return to the hospital felt he may have been discharged too soon do [facility] hospitalization patient had PETH checked - value @ 682, suspected to be etiology for pancreatitis.  Today patient says he has had no alcohol use since prior to initial September hospitalization.   He smokes cigarettes approx 1 pack every 2-3/weeks.     With repeat vomiting today he did not have any hematemesis.   At discharge on October 4th white count was 11 and today is elevated at 23.  Pain was so severe in the emergency department that he required ketamine 20mg dose to help improve symptoms.     ED Treatment; Cefepime,  Droperidol, LR 1L, NS 1L, Morphine 4mg x 2, Flagyl 500mg, Zofran.   Ketamine 20mg.       Overview/Hospital Course:  No notes on file    Interval History:   10/7: advance diet today, patient aware of need for IV antibiotics.       Review of Systems   Constitutional:  Positive for appetite change, chills and fever.   HENT:  Negative for sinus pressure and sore throat.    Eyes:  Negative for visual disturbance.   Respiratory:  Negative for cough and shortness of breath.    Cardiovascular:  Negative for chest pain and leg swelling.   Gastrointestinal:  Positive for abdominal pain, nausea and vomiting.   Genitourinary:  Negative for dysuria.   Musculoskeletal:  Negative for back pain.   Skin:  Negative for rash.   Neurological:  Positive for weakness.        Pre-syncope   Psychiatric/Behavioral:  Negative for confusion.      Objective:     Vital Signs (Most Recent):  Temp: 98.5 °F (36.9 °C) (10/07/23 1146)  Pulse: 83 (10/07/23 1146)  Resp: 20 (10/07/23 1146)  BP: 124/73 (10/07/23 1146)  SpO2: 97 % (10/07/23 1146) Vital Signs (24h Range):  Temp:  [97.8 °F (36.6 °C)-99 °F (37.2 °C)] 98.5 °F (36.9 °C)  Pulse:  [] 83  Resp:  [14-20] 20  SpO2:  [96 %-99 %] 97 %  BP: (112-124)/(61-73) 124/73     Weight: 72.1 kg (158 lb 15.2 oz)  Body mass index is 22.81  kg/m².    Intake/Output Summary (Last 24 hours) at 10/7/2023 1220  Last data filed at 10/7/2023 0424  Gross per 24 hour   Intake 1300 ml   Output --   Net 1300 ml         Physical Exam  Vitals reviewed.   Constitutional:       General: He is not in acute distress.     Appearance: He is not ill-appearing.   Eyes:      General: No scleral icterus.  Cardiovascular:      Rate and Rhythm: Normal rate.   Pulmonary:      Effort: Pulmonary effort is normal. No respiratory distress.   Abdominal:      General: There is no distension.      Tenderness: There is abdominal tenderness. There is no guarding or rebound.   Skin:     Coloration: Skin is not jaundiced.   Neurological:      Mental Status: He is alert. Mental status is at baseline.             Significant Labs: All pertinent labs within the past 24 hours have been reviewed.    Significant Imaging: I have reviewed all pertinent imaging results/findings within the past 24 hours.      Assessment/Plan:      * Acute necrotizing pancreatitis  Patient with necrotizing pancreatitis and now sequelae of large pseudocyst concerning for infection  -Admitted to [facility] from 9/29-10/04 - received empiric Meropenem until 10/02 then transitioned to Cirpo/Flagyl, on 10/4 WBC was 11 - 1 day from discharge patient presents for readmission and WBC 23.   -BLood cultures obtained   -Empiric Meropenem reordered  Infectious disease consult ordered for restricted abx use  -AES consult ordered - evaluate new imaging - is fluid collection/?abscess amenable to endoscopic drainage or should  Percutaneous aspiration vs drainage be pursued  For source control/cultures.     Continue IV antiemetics, lower rate IV fluids, IV opioid pain medications - increasing to 2mg IV dilaudid PRn for severe,  1mg IV prn for moderate pain.     Glucose is 150s - check A1c in AM, pt potentially at high risk to develop diabetes due to destruction from severity of pancreatitis.     Keep NPO except sips - given repeat  "admissions - patient may require low rate tube feed enteral nutrition vs parenteral nutrition - discuss with GI.     Alcohol use disorder  Reported in [facility] record patient with PETH level elevated 680s.  He reports last alcohol use is prior to initial admission with pancreatitis 9/17.   -      Pancreatic abscess  As per primary problem.       Splenic vein thrombosis  Complication from necrotizing pancreatitis - not on therapeutic anticoagulation  Continue DVT ppx dose enoxaparin      Corinne-Chauhan tear  Present during 9/17 admission s/p EGD  With recurrent vomiting today he denies any hematemsis  Continue PPI   -Recent EGD Pathology results show   A. DUODENUM, BIOPSY:   - Duodenal mucosa with focal gastric metaplasia and focal superficial erosion, see comment.   - No evidence of infection is identified.   - No evidence of villous architectural distortion, increased intraepithelial lymphocytes, active inflammation, granulomas or malignancy.   - Multiple levels examined.     B. STOMACH, ANTRUM, BIOPSY:   - Antral mucosa with changes suggestive of reactive (chemical) gastropathy).   - No Helicobacter-type organisms identified on the *Helicobacter stain.         Hepatitis C antibody positive in blood  HCV AB positive.  HCV viral load negative  Refer to hepatology as outpatient.     Polycythemia  Chronic - stable - follows with hematology as outpatient - labs show anemia at this time.       HIV infection  This patient in known to have HIV+ status (Have detected HIV PCR but never CD4 <200 or AIDS defining illness). Labs reviewed- No results found for: "CD4", No results found for: "HIVDNAPCR". Patient is on HAART. Will continue HAART. Prophylaxis was not indicated at this time- . Continue to monitor routine labs. Last CD4 count was   Lab Results   Component Value Date    ABSOLUTECD4 296 (L) 09/20/2023       We will consult Infectious disease at this time. Evaluate and treat HIV-associated diseases as indicated by specific " problems listed below.       VTE Risk Mitigation (From admission, onward)         Ordered     enoxaparin injection 40 mg  Daily         10/06/23 0124     IP VTE HIGH RISK PATIENT  Once         10/06/23 0124     Place sequential compression device  Until discontinued         10/06/23 0124                Discharge Planning   CASTILLO: 10/9/2023     Code Status: Full Code   Is the patient medically ready for discharge?: No    Reason for patient still in hospital (select all that apply): Patient trending condition  Discharge Plan A: Home                  Brigida Barrow MD  Department of Hospital Medicine   [facility] - Transplant Stepdown

## 2023-10-07 NOTE — PLAN OF CARE
AAOX4  VVS  PAIN MEDS GIVEN Q 4 PRN   NO ACUTE DISTRESS OR CHANGE NOTED   PT REFUSED MIDLINE UNTIL HE TALK TO DR BARRERA   SAFETY IN PLACE AND VERBAL UNDERSTANDING NOTED TO CALL PRN

## 2023-10-07 NOTE — SUBJECTIVE & OBJECTIVE
Interval History: LEROY    Review of Systems   Constitutional:  Negative for activity change, appetite change and fever.   HENT:  Negative for trouble swallowing.    Respiratory:  Negative for chest tightness and shortness of breath.    Cardiovascular:  Negative for chest pain.   Gastrointestinal:  Positive for abdominal pain, diarrhea and nausea. Negative for blood in stool and vomiting.   Genitourinary:  Negative for dysuria and hematuria.   Musculoskeletal:  Negative for arthralgias, joint swelling and neck stiffness.   Neurological:  Negative for syncope and light-headedness.   Hematological:  Negative for adenopathy. Does not bruise/bleed easily.   Psychiatric/Behavioral:  Negative for confusion.        Objective:     Vital Signs (Most Recent):  Temp: 97.8 °F (36.6 °C) (10/07/23 0851)  Pulse: 88 (10/07/23 0851)  Resp: 20 (10/07/23 0851)  BP: 112/64 (10/07/23 0851)  SpO2: 99 % (10/07/23 0851) Vital Signs (24h Range):  Temp:  [97.8 °F (36.6 °C)-99 °F (37.2 °C)] 97.8 °F (36.6 °C)  Pulse:  [] 88  Resp:  [12-20] 20  SpO2:  [96 %-99 %] 99 %  BP: (112-124)/(61-73) 112/64     Weight: 72.1 kg (158 lb 15.2 oz)  Body mass index is 22.81 kg/m².    Estimated Creatinine Clearance: 193.6 mL/min (based on SCr of 0.6 mg/dL).     Physical Exam  Constitutional:       Appearance: Normal appearance.   HENT:      Head: Normocephalic and atraumatic.      Nose: Nose normal.      Mouth/Throat:      Mouth: Mucous membranes are moist.      Pharynx: Oropharynx is clear.   Eyes:      Conjunctiva/sclera: Conjunctivae normal.   Cardiovascular:      Rate and Rhythm: Normal rate and regular rhythm.      Pulses: Normal pulses.      Heart sounds: Normal heart sounds.   Pulmonary:      Effort: Pulmonary effort is normal.      Breath sounds: Normal breath sounds.   Abdominal:      General: Bowel sounds are normal.      Palpations: Abdomen is soft.      Tenderness: There is abdominal tenderness. There is no guarding or rebound.      Comments:  RUQ and epigastric tenderness on mild palpation   Musculoskeletal:         General: No deformity. Normal range of motion.      Cervical back: Neck supple.   Skin:     General: Skin is warm and dry.      Coloration: Skin is not jaundiced.      Findings: No rash.   Neurological:      Mental Status: He is alert and oriented to person, place, and time. Mental status is at baseline.          Significant Labs: All pertinent labs within the past 24 hours have been reviewed.  Recent Lab Results         10/07/23  0507   10/07/23  0422   10/06/23  2034        Albumin 2.6           ALP 67           ALT 6           Anion Gap 11           AST 16           Baso # 0.04           Basophil % 0.6           BILIRUBIN TOTAL 0.4  Comment: For infants and newborns, interpretation of results should be based  on gestational age, weight and in agreement with clinical  observations.    Premature Infant recommended reference ranges:  Up to 24 hours.............<8.0 mg/dL  Up to 48 hours............<12.0 mg/dL  3-5 days..................<15.0 mg/dL  6-29 days.................<15.0 mg/dL             BUN 3           Calcium 8.8           Chloride 101           CO2 24           Creatinine 0.6           Differential Method Automated           eGFR >60.0           Eos # 0.2           Eosinophil % 3.2           Glucose 82           Gran # (ANC) 3.7           Gran % 59.4           Hematocrit 29.8           Hemoglobin 9.5           Immature Grans (Abs) 0.04  Comment: Mild elevation in immature granulocytes is non specific and   can be seen in a variety of conditions including stress response,   acute inflammation, trauma and pregnancy. Correlation with other   laboratory and clinical findings is essential.             Immature Granulocytes 0.6           Lymph # 1.7           Lymph % 26.8           Magnesium  1.8           MCH 29.0           MCHC 31.9           MCV 91           Mono # 0.6           Mono % 9.4           MPV 9.6           nRBC 0            Phosphorus Level 3.6           Platelet Estimate Appears normal           Platelet Count 417           POCT Glucose   95   90       Potassium 3.8           PROTEIN TOTAL 6.4           RBC 3.28           RDW 19.7           Smudge Cells Present  Comment: Smudge cells present;Substantial numbers may affect the   accuracy of the differential.             Sodium 136           WBC 6.27                   Significant Imaging: I have reviewed all pertinent imaging results/findings within the past 24 hours.

## 2023-10-07 NOTE — PLAN OF CARE
Outpatient Antibiotic Therapy Plan:    Please send referral to [facility].    1) Infection: Infected pancreatic collection    2) Discharge Antibiotics:    Intravenous antibiotics:  Cefepime 2 g IV q12 hours      3) Therapy Duration:  four weeks    Estimated end date of IV antibiotics: 11/01/2023    4) Outpatient Weekly Labs:    Order the following labs to be drawn on Mondays:   CBC  CMP   CRP    5) Fax Lab Results to Infectious Diseases Provider: Kaila    [facility] Fax Number: [phone]    6) Outpatient Infectious Diseases Follow-up    Follow-up appointment will be arranged by the ID clinic and will be found in the patient's appointments tab.    Prior to discharge, please ensure the patient's follow-up has been scheduled.    If there is still no follow-up scheduled prior to discharge, please send an EPIC message to Bess Mullins in Infectious Diseases.

## 2023-10-07 NOTE — PROGRESS NOTES
[facility] - Transplant Stepdown  Infectious Disease  Progress Note    Patient Name: Tasia Cardona  MRN: [id number]  Admission Date: 10/5/2023  Length of Stay: 2 days  Attending Physician: Brigida Barrow MD  Primary Care Provider: Pina Jones NP    Isolation Status: No active isolations  Assessment/Plan:      ID  HIV infection  Currently on Biktarvy, has been complaint with medication and receiving it while in patient. Last CD4 count on 9/20 was 296. Cause of pancreatitis more likely due to alcohol (positive peth at [facility]).   - Follow up will be arranged in ID clinic  - Continue Biktarvy     GI  * Acute necrotizing pancreatitis  23 year old with a history of HIV, brandy-hitchcock tear, recent pancreatitis c/b zehra-pancreatic fluid collection who presented for worsening abdominal pain. Recently discharged on 10/4 from [facility] for similar presentation and was placed on Cipro/Flagyl due to fevers and leukocytosis w/ end date of 10/5. He presented here again with intractable pain, leukocytosis and subjective fever, leukocytosis has improved since initiation of antibiotics. CT A/P showed interval development of multi lobulated peripancreatic fluid collection suggesting pancreatic pseudo-cysts. No air was seen in the fluid collection. He was started on meropenem due to penicillin allergy and then transitioned to pip-tazo after ID evaluation, GI evaluated, defer fluid drainage at this time.     Recommendations:    1. Antibiotics are usually needed for ANC and WON in acute pancreatitis cases, however since it is difficult to rule out infection in such a case as this, we favor giving antimicrobial therapy    2. Four weeks of IV pip-tazo, see OPAT note for details. Discussed OPAT and management with patient.             Anticipated Disposition: TBD    Thank you for your consult. I will sign off. Please contact us if you have any additional questions.    Parag Bright MD  Infectious Disease  [facility] - Transplant  Stepdown    Subjective:     Principal Problem:Acute necrotizing pancreatitis    HPI: Mr. Cardona is a 23 year old male with a history of with a history of HIV on Biktarvy, Asthma, polycythemia who presents with worsening abdominal pain since discharge from [facility] on 10/4/23. Patient had recent admission on 9/17 for Corinne-Chauhan tear, hematemesis. EGD was performed at that visit after which he developed acute pancreatitis with splenic vein thrombosis. He was treated inpatient with vanc/cefepime and discharged once tolerating diet. He was admitted to [facility] on 9/29 for intractable abdominal pain, CT at that time was showing large encapsulating peripancreatic fluid collection. He was treated w/ meropenem and discharged w/ Cipro/Flagyl w/ end date of 10/5. Patient returned on 10/5 again with worsening abdominal pain. CT abd/pelvis showed development of multi lobulated peripancreatic fluid collections appearing to communicate with one another. Labs significant for wbc of 23.83.  He was treated with cefepime/flagyl, morphine, ketamine in the ED and admitted. He is currently continued on meropenem. Patient denies any alcohol use since episode of pancreatitis, has been complaint with his Biktarvy. Establishing care with Dr. Herrera for HIV management.            Interval History: LEROY    Review of Systems   Constitutional:  Negative for activity change, appetite change and fever.   HENT:  Negative for trouble swallowing.    Respiratory:  Negative for chest tightness and shortness of breath.    Cardiovascular:  Negative for chest pain.   Gastrointestinal:  Positive for abdominal pain, diarrhea and nausea. Negative for blood in stool and vomiting.   Genitourinary:  Negative for dysuria and hematuria.   Musculoskeletal:  Negative for arthralgias, joint swelling and neck stiffness.   Neurological:  Negative for syncope and light-headedness.   Hematological:  Negative for adenopathy. Does not bruise/bleed easily.    Psychiatric/Behavioral:  Negative for confusion.        Objective:     Vital Signs (Most Recent):  Temp: 97.8 °F (36.6 °C) (10/07/23 0851)  Pulse: 88 (10/07/23 0851)  Resp: 20 (10/07/23 0851)  BP: 112/64 (10/07/23 0851)  SpO2: 99 % (10/07/23 0851) Vital Signs (24h Range):  Temp:  [97.8 °F (36.6 °C)-99 °F (37.2 °C)] 97.8 °F (36.6 °C)  Pulse:  [] 88  Resp:  [12-20] 20  SpO2:  [96 %-99 %] 99 %  BP: (112-124)/(61-73) 112/64     Weight: 72.1 kg (158 lb 15.2 oz)  Body mass index is 22.81 kg/m².    Estimated Creatinine Clearance: 193.6 mL/min (based on SCr of 0.6 mg/dL).     Physical Exam  Constitutional:       Appearance: Normal appearance.   HENT:      Head: Normocephalic and atraumatic.      Nose: Nose normal.      Mouth/Throat:      Mouth: Mucous membranes are moist.      Pharynx: Oropharynx is clear.   Eyes:      Conjunctiva/sclera: Conjunctivae normal.   Cardiovascular:      Rate and Rhythm: Normal rate and regular rhythm.      Pulses: Normal pulses.      Heart sounds: Normal heart sounds.   Pulmonary:      Effort: Pulmonary effort is normal.      Breath sounds: Normal breath sounds.   Abdominal:      General: Bowel sounds are normal.      Palpations: Abdomen is soft.      Tenderness: There is abdominal tenderness. There is no guarding or rebound.      Comments: RUQ and epigastric tenderness on mild palpation   Musculoskeletal:         General: No deformity. Normal range of motion.      Cervical back: Neck supple.   Skin:     General: Skin is warm and dry.      Coloration: Skin is not jaundiced.      Findings: No rash.   Neurological:      Mental Status: He is alert and oriented to person, place, and time. Mental status is at baseline.          Significant Labs: All pertinent labs within the past 24 hours have been reviewed.  Recent Lab Results         10/07/23  0507   10/07/23  0422   10/06/23  2034        Albumin 2.6           ALP 67           ALT 6           Anion Gap 11           AST 16           Baso #  0.04           Basophil % 0.6           BILIRUBIN TOTAL 0.4  Comment: For infants and newborns, interpretation of results should be based  on gestational age, weight and in agreement with clinical  observations.    Premature Infant recommended reference ranges:  Up to 24 hours.............<8.0 mg/dL  Up to 48 hours............<12.0 mg/dL  3-5 days..................<15.0 mg/dL  6-29 days.................<15.0 mg/dL             BUN 3           Calcium 8.8           Chloride 101           CO2 24           Creatinine 0.6           Differential Method Automated           eGFR >60.0           Eos # 0.2           Eosinophil % 3.2           Glucose 82           Gran # (ANC) 3.7           Gran % 59.4           Hematocrit 29.8           Hemoglobin 9.5           Immature Grans (Abs) 0.04  Comment: Mild elevation in immature granulocytes is non specific and   can be seen in a variety of conditions including stress response,   acute inflammation, trauma and pregnancy. Correlation with other   laboratory and clinical findings is essential.             Immature Granulocytes 0.6           Lymph # 1.7           Lymph % 26.8           Magnesium  1.8           MCH 29.0           MCHC 31.9           MCV 91           Mono # 0.6           Mono % 9.4           MPV 9.6           nRBC 0           Phosphorus Level 3.6           Platelet Estimate Appears normal           Platelet Count 417           POCT Glucose   95   90       Potassium 3.8           PROTEIN TOTAL 6.4           RBC 3.28           RDW 19.7           Smudge Cells Present  Comment: Smudge cells present;Substantial numbers may affect the   accuracy of the differential.             Sodium 136           WBC 6.27                   Significant Imaging: I have reviewed all pertinent imaging results/findings within the past 24 hours.

## 2023-10-07 NOTE — ASSESSMENT & PLAN NOTE
Currently on Biktarvy, has been complaint with medication and receiving it while in patient. Last CD4 count on 9/20 was 296. Cause of pancreatitis more likely due to alcohol (positive peth at [facility]).   - Follow up will be arranged in ID clinic  - Continue Biktarvy

## 2023-10-07 NOTE — ASSESSMENT & PLAN NOTE
23 year old with a history of HIV, brandy-hitchcock tear, recent pancreatitis c/b zehra-pancreatic fluid collection who presented for worsening abdominal pain. Recently discharged on 10/4 from [facility] for similar presentation and was placed on Cipro/Flagyl due to fevers and leukocytosis w/ end date of 10/5. He presented here again with intractable pain, leukocytosis and subjective fever, leukocytosis has improved since initiation of antibiotics. CT A/P showed interval development of multi lobulated peripancreatic fluid collection suggesting pancreatic pseudo-cysts. No air was seen in the fluid collection. He was started on meropenem due to penicillin allergy and then transitioned to pip-tazo after ID evaluation, GI evaluated, defer fluid drainage at this time.     Recommendations:    1. Antibiotics are usually needed for ANC and WON in acute pancreatitis cases, however since it is difficult to rule out infection in such a case as this, we favor giving antimicrobial therapy    2. Four weeks of IV pip-tazo, see OPAT note for details. Discussed OPAT and management with patient.

## 2023-10-08 LAB
ALBUMIN SERPL BCP-MCNC: 2.6 G/DL (ref 3.5–5.2)
ALP SERPL-CCNC: 65 U/L (ref 55–135)
ALT SERPL W/O P-5'-P-CCNC: 8 U/L (ref 10–44)
ANION GAP SERPL CALC-SCNC: 12 MMOL/L (ref 8–16)
AST SERPL-CCNC: 18 U/L (ref 10–40)
BASOPHILS # BLD AUTO: 0.05 K/UL (ref 0–0.2)
BASOPHILS NFR BLD: 0.8 % (ref 0–1.9)
BILIRUB SERPL-MCNC: 0.3 MG/DL (ref 0.1–1)
BUN SERPL-MCNC: 3 MG/DL (ref 6–20)
CALCIUM SERPL-MCNC: 8.7 MG/DL (ref 8.7–10.5)
CHLORIDE SERPL-SCNC: 101 MMOL/L (ref 95–110)
CO2 SERPL-SCNC: 25 MMOL/L (ref 23–29)
CREAT SERPL-MCNC: 0.6 MG/DL (ref 0.5–1.4)
DIFFERENTIAL METHOD: ABNORMAL
EOSINOPHIL # BLD AUTO: 0.3 K/UL (ref 0–0.5)
EOSINOPHIL NFR BLD: 4.1 % (ref 0–8)
ERYTHROCYTE [DISTWIDTH] IN BLOOD BY AUTOMATED COUNT: 19.4 % (ref 11.5–14.5)
EST. GFR  (NO RACE VARIABLE): >60 ML/MIN/1.73 M^2
GLUCOSE SERPL-MCNC: 85 MG/DL (ref 70–110)
HCT VFR BLD AUTO: 31.8 % (ref 40–54)
HGB BLD-MCNC: 9.7 G/DL (ref 14–18)
IMM GRANULOCYTES # BLD AUTO: 0.03 K/UL (ref 0–0.04)
IMM GRANULOCYTES NFR BLD AUTO: 0.5 % (ref 0–0.5)
LYMPHOCYTES # BLD AUTO: 1.6 K/UL (ref 1–4.8)
LYMPHOCYTES NFR BLD: 25.8 % (ref 18–48)
MAGNESIUM SERPL-MCNC: 1.8 MG/DL (ref 1.6–2.6)
MCH RBC QN AUTO: 28.9 PG (ref 27–31)
MCHC RBC AUTO-ENTMCNC: 30.5 G/DL (ref 32–36)
MCV RBC AUTO: 95 FL (ref 82–98)
MONOCYTES # BLD AUTO: 0.6 K/UL (ref 0.3–1)
MONOCYTES NFR BLD: 10.2 % (ref 4–15)
NEUTROPHILS # BLD AUTO: 3.6 K/UL (ref 1.8–7.7)
NEUTROPHILS NFR BLD: 58.6 % (ref 38–73)
NRBC BLD-RTO: 0 /100 WBC
PHOSPHATE SERPL-MCNC: 5.1 MG/DL (ref 2.7–4.5)
PLATELET # BLD AUTO: 391 K/UL (ref 150–450)
PMV BLD AUTO: 9.9 FL (ref 9.2–12.9)
POCT GLUCOSE: 108 MG/DL (ref 70–110)
POTASSIUM SERPL-SCNC: 3.6 MMOL/L (ref 3.5–5.1)
PROT SERPL-MCNC: 6.3 G/DL (ref 6–8.4)
RBC # BLD AUTO: 3.36 M/UL (ref 4.6–6.2)
SODIUM SERPL-SCNC: 138 MMOL/L (ref 136–145)
WBC # BLD AUTO: 6.08 K/UL (ref 3.9–12.7)

## 2023-10-08 PROCEDURE — 99233 PR SUBSEQUENT HOSPITAL CARE,LEVL III: ICD-10-PCS | Mod: ,,, | Performed by: INTERNAL MEDICINE

## 2023-10-08 PROCEDURE — 25000003 PHARM REV CODE 250: Performed by: HOSPITALIST

## 2023-10-08 PROCEDURE — 84100 ASSAY OF PHOSPHORUS: CPT | Performed by: HOSPITALIST

## 2023-10-08 PROCEDURE — 83735 ASSAY OF MAGNESIUM: CPT | Performed by: HOSPITALIST

## 2023-10-08 PROCEDURE — 99233 PR SUBSEQUENT HOSPITAL CARE,LEVL III: ICD-10-PCS | Mod: ,,, | Performed by: HOSPITALIST

## 2023-10-08 PROCEDURE — 85025 COMPLETE CBC W/AUTO DIFF WBC: CPT | Performed by: HOSPITALIST

## 2023-10-08 PROCEDURE — 63600175 PHARM REV CODE 636 W HCPCS: Performed by: HOSPITALIST

## 2023-10-08 PROCEDURE — 36415 COLL VENOUS BLD VENIPUNCTURE: CPT | Performed by: HOSPITALIST

## 2023-10-08 PROCEDURE — 25000003 PHARM REV CODE 250: Performed by: INTERNAL MEDICINE

## 2023-10-08 PROCEDURE — 80053 COMPREHEN METABOLIC PANEL: CPT | Performed by: HOSPITALIST

## 2023-10-08 PROCEDURE — 20600001 HC STEP DOWN PRIVATE ROOM

## 2023-10-08 PROCEDURE — 99233 SBSQ HOSP IP/OBS HIGH 50: CPT | Mod: ,,, | Performed by: INTERNAL MEDICINE

## 2023-10-08 PROCEDURE — 99233 SBSQ HOSP IP/OBS HIGH 50: CPT | Mod: ,,, | Performed by: HOSPITALIST

## 2023-10-08 RX ORDER — HYDROCODONE BITARTRATE AND ACETAMINOPHEN 10; 325 MG/1; MG/1
1 TABLET ORAL EVERY 6 HOURS PRN
Status: DISCONTINUED | OUTPATIENT
Start: 2023-10-09 | End: 2023-10-09

## 2023-10-08 RX ORDER — CEFPODOXIME PROXETIL 200 MG/1
400 TABLET, FILM COATED ORAL EVERY 12 HOURS
Status: DISCONTINUED | OUTPATIENT
Start: 2023-10-08 | End: 2023-10-10

## 2023-10-08 RX ADMIN — HYDROMORPHONE HYDROCHLORIDE 2 MG: 1 INJECTION, SOLUTION INTRAMUSCULAR; INTRAVENOUS; SUBCUTANEOUS at 10:10

## 2023-10-08 RX ADMIN — FOLIC ACID 1 MG: 1 TABLET ORAL at 08:10

## 2023-10-08 RX ADMIN — ENOXAPARIN SODIUM 40 MG: 40 INJECTION SUBCUTANEOUS at 06:10

## 2023-10-08 RX ADMIN — PROCHLORPERAZINE EDISYLATE 5 MG: 5 INJECTION INTRAMUSCULAR; INTRAVENOUS at 01:10

## 2023-10-08 RX ADMIN — HYDROMORPHONE HYDROCHLORIDE 2 MG: 1 INJECTION, SOLUTION INTRAMUSCULAR; INTRAVENOUS; SUBCUTANEOUS at 06:10

## 2023-10-08 RX ADMIN — PANTOPRAZOLE SODIUM 40 MG: 40 TABLET, DELAYED RELEASE ORAL at 08:10

## 2023-10-08 RX ADMIN — HYDROMORPHONE HYDROCHLORIDE 2 MG: 1 INJECTION, SOLUTION INTRAMUSCULAR; INTRAVENOUS; SUBCUTANEOUS at 02:10

## 2023-10-08 RX ADMIN — BICTEGRAVIR SODIUM, EMTRICITABINE, AND TENOFOVIR ALAFENAMIDE FUMARATE 1 TABLET: 50; 200; 25 TABLET ORAL at 08:10

## 2023-10-08 RX ADMIN — CEFPODOXIME PROXETIL 400 MG: 200 TABLET, FILM COATED ORAL at 08:10

## 2023-10-08 RX ADMIN — CEFEPIME 2 G: 2 INJECTION, POWDER, FOR SOLUTION INTRAVENOUS at 06:10

## 2023-10-08 RX ADMIN — SENNOSIDES AND DOCUSATE SODIUM 1 TABLET: 50; 8.6 TABLET ORAL at 08:10

## 2023-10-08 NOTE — PROGRESS NOTES
[facility]  Progress Note    Patient Name: Tasia Cardona  MRN: [id number]  Patient Class: IP- Inpatient   Admission Date: 10/5/2023  Length of Stay: 3 days  Attending Physician: Brigida Barrow MD  Primary Care Provider: Pina Jones NP        Subjective:     Principal Problem:Acute necrotizing pancreatitis        HPI:  23-year-old man with a history of HIV on HAART, asthma, polycythemia presents to the emergency department with complaints of uncontrolled abdominal pain diagnosis of necrotizing pancreatitis.    Recent history is that patient was admitted to [facility] [phone], presented with concerns for hematemesis Chauhan tear, post EGD he developed worsening abdominal pain, CT imaging obtained in consistent with acute necrotizing pancreatitis with splenic vein thrombosis.  Hematology consulted and anticoagulation not recommended for splenic vein thrombosis, he received 2 days of empiric IV antibiotic therapy with vancomycin and cefepime, Infectious Disease was consulted.  Prior to discharge patient was tolerating oral diet    He was then admitted to [facility] on September 29th to October 4th.  Presenting symptoms were uncontrolled abdominal pain and fevers CT scan demonstrated sequela of necrotizing pancreatitis with large encapsulating peripancreatic fluid collection.  He was started on empiric meropenem until October 2nd transition to oral ciprofloxacin and Flagyl and discharged with with oral antibiotic prescription.  Patient reported pain persisted he was discharged with oral oxycodone for pain management.    He went home on October 4th approximately around 3:00 p.m. drink only some liquids that evening and the morning of October 5th he had a smoothie, eggs and cinnamon toast for breakfast took a nap around 11:00 a.m. and woke up at 1:00 p.m. with severe abdominal pain in the epigastrium and upper quadrants.  He states while taking shower he felt  presyncopal did not fall and sat on the ground, decided to return to the hospital felt he may have been discharged too soon do [facility] hospitalization patient had PETH checked - value @ 682, suspected to be etiology for pancreatitis.  Today patient says he has had no alcohol use since prior to initial September hospitalization.   He smokes cigarettes approx 1 pack every 2-3/weeks.     With repeat vomiting today he did not have any hematemesis.   At discharge on October 4th white count was 11 and today is elevated at 23.  Pain was so severe in the emergency department that he required ketamine 20mg dose to help improve symptoms.     ED Treatment; Cefepime,  Droperidol, LR 1L, NS 1L, Morphine 4mg x 2, Flagyl 500mg, Zofran.   Ketamine 20mg.       Overview/Hospital Course:  No notes on file    Interval History:   10/8: reports abdominal pain unchanged.       Review of Systems   Constitutional:  Positive for appetite change, chills and fever.   HENT:  Negative for sinus pressure and sore throat.    Eyes:  Negative for visual disturbance.   Respiratory:  Negative for cough and shortness of breath.    Cardiovascular:  Negative for chest pain and leg swelling.   Gastrointestinal:  Positive for abdominal pain, nausea and vomiting.   Genitourinary:  Negative for dysuria.   Musculoskeletal:  Negative for back pain.   Skin:  Negative for rash.   Neurological:  Positive for weakness.        Pre-syncope   Psychiatric/Behavioral:  Negative for confusion.      Objective:     Vital Signs (Most Recent):  Temp: 98.3 °F (36.8 °C) (10/08/23 1106)  Pulse: 94 (10/08/23 1106)  Resp: 18 (10/08/23 1106)  BP: 122/73 (10/08/23 1106)  SpO2: 96 % (10/08/23 1106) Vital Signs (24h Range):  Temp:  [98 °F (36.7 °C)-98.9 °F (37.2 °C)] 98.3 °F (36.8 °C)  Pulse:  [] 94  Resp:  [16-20] 18  SpO2:  [94 %-97 %] 96 %  BP: (116-128)/(67-81) 122/73     Weight: 72.1 kg (158 lb 15.2 oz)  Body mass index is 22.81 kg/m².  No intake or output data in the 24  hours ending 10/08/23 1442      Physical Exam  Vitals reviewed.   Constitutional:       General: He is not in acute distress.     Appearance: He is not ill-appearing.   Eyes:      General: No scleral icterus.  Cardiovascular:      Rate and Rhythm: Normal rate.   Pulmonary:      Effort: Pulmonary effort is normal. No respiratory distress.   Abdominal:      General: There is no distension.      Tenderness: There is abdominal tenderness. There is no guarding or rebound.   Skin:     Coloration: Skin is not jaundiced.   Neurological:      Mental Status: He is alert. Mental status is at baseline.             Significant Labs: All pertinent labs within the past 24 hours have been reviewed.    Significant Imaging: I have reviewed all pertinent imaging results/findings within the past 24 hours.      Assessment/Plan:      * Acute necrotizing pancreatitis  Patient with necrotizing pancreatitis and now sequelae of large pseudocyst concerning for infection  -Admitted to [facility] from 9/29-10/04 - received empiric Meropenem until 10/02 then transitioned to Cirpo/Flagyl, on 10/4 WBC was 11 - 1 day from discharge patient presents for readmission and WBC 23.   -BLood cultures obtained   -Empiric Meropenem reordered  Infectious disease consult ordered for restricted abx use  -AES consult ordered - evaluate new imaging - is fluid collection/?abscess amenable to endoscopic drainage or should  Percutaneous aspiration vs drainage be pursued  For source control/cultures.     Continue IV antiemetics, lower rate IV fluids, IV opioid pain medications - increasing to 2mg IV dilaudid PRn for severe,  1mg IV prn for moderate pain.     Glucose is 150s - check A1c in AM, pt potentially at high risk to develop diabetes due to destruction from severity of pancreatitis.     Keep NPO except sips - given repeat admissions - patient may require low rate tube feed enteral nutrition vs parenteral nutrition - discuss with GI.     Alcohol use disorder  Reported  "in [facility] record patient with PETH level elevated 680s.  He reports last alcohol use is prior to initial admission with pancreatitis 9/17.   -      Pancreatic abscess  As per primary problem.       Splenic vein thrombosis  Complication from necrotizing pancreatitis - not on therapeutic anticoagulation  Continue DVT ppx dose enoxaparin      Corinne-Chauhan tear  Present during 9/17 admission s/p EGD  With recurrent vomiting today he denies any hematemsis  Continue PPI   -Recent EGD Pathology results show   A. DUODENUM, BIOPSY:   - Duodenal mucosa with focal gastric metaplasia and focal superficial erosion, see comment.   - No evidence of infection is identified.   - No evidence of villous architectural distortion, increased intraepithelial lymphocytes, active inflammation, granulomas or malignancy.   - Multiple levels examined.     B. STOMACH, ANTRUM, BIOPSY:   - Antral mucosa with changes suggestive of reactive (chemical) gastropathy).   - No Helicobacter-type organisms identified on the *Helicobacter stain.         Hepatitis C antibody positive in blood  HCV AB positive.  HCV viral load negative  Refer to hepatology as outpatient.     Polycythemia  Chronic - stable - follows with hematology as outpatient - labs show anemia at this time.       HIV infection  This patient in known to have HIV+ status (Have detected HIV PCR but never CD4 <200 or AIDS defining illness). Labs reviewed- No results found for: "CD4", No results found for: "HIVDNAPCR". Patient is on HAART. Will continue HAART. Prophylaxis was not indicated at this time- . Continue to monitor routine labs. Last CD4 count was   Lab Results   Component Value Date    ABSOLUTECD4 296 (L) 09/20/2023       We will consult Infectious disease at this time. Evaluate and treat HIV-associated diseases as indicated by specific problems listed below.       VTE Risk Mitigation (From admission, onward)         Ordered     enoxaparin injection 40 mg  Daily         10/06/23 0124 "     IP VTE HIGH RISK PATIENT  Once         10/06/23 0124     Place sequential compression device  Until discontinued         10/06/23 0124                Discharge Planning   CASTILLO: 10/9/2023     Code Status: Full Code   Is the patient medically ready for discharge?: No    Reason for patient still in hospital (select all that apply): Patient trending condition  Discharge Plan A: Home                  Brigida Barrow MD  Department of Hospital Medicine   [facility] - Transplant Stepdown

## 2023-10-08 NOTE — SUBJECTIVE & OBJECTIVE
Interval History:   10/8: reports abdominal pain unchanged.       Review of Systems   Constitutional:  Positive for appetite change, chills and fever.   HENT:  Negative for sinus pressure and sore throat.    Eyes:  Negative for visual disturbance.   Respiratory:  Negative for cough and shortness of breath.    Cardiovascular:  Negative for chest pain and leg swelling.   Gastrointestinal:  Positive for abdominal pain, nausea and vomiting.   Genitourinary:  Negative for dysuria.   Musculoskeletal:  Negative for back pain.   Skin:  Negative for rash.   Neurological:  Positive for weakness.        Pre-syncope   Psychiatric/Behavioral:  Negative for confusion.      Objective:     Vital Signs (Most Recent):  Temp: 98.3 °F (36.8 °C) (10/08/23 1106)  Pulse: 94 (10/08/23 1106)  Resp: 18 (10/08/23 1106)  BP: 122/73 (10/08/23 1106)  SpO2: 96 % (10/08/23 1106) Vital Signs (24h Range):  Temp:  [98 °F (36.7 °C)-98.9 °F (37.2 °C)] 98.3 °F (36.8 °C)  Pulse:  [] 94  Resp:  [16-20] 18  SpO2:  [94 %-97 %] 96 %  BP: (116-128)/(67-81) 122/73     Weight: 72.1 kg (158 lb 15.2 oz)  Body mass index is 22.81 kg/m².  No intake or output data in the 24 hours ending 10/08/23 1442      Physical Exam  Vitals reviewed.   Constitutional:       General: He is not in acute distress.     Appearance: He is not ill-appearing.   Eyes:      General: No scleral icterus.  Cardiovascular:      Rate and Rhythm: Normal rate.   Pulmonary:      Effort: Pulmonary effort is normal. No respiratory distress.   Abdominal:      General: There is no distension.      Tenderness: There is abdominal tenderness. There is no guarding or rebound.   Skin:     Coloration: Skin is not jaundiced.   Neurological:      Mental Status: He is alert. Mental status is at baseline.             Significant Labs: All pertinent labs within the past 24 hours have been reviewed.    Significant Imaging: I have reviewed all pertinent imaging results/findings within the past 24 hours.

## 2023-10-08 NOTE — SUBJECTIVE & OBJECTIVE
Interval History: Continues to have abdominal pain.  States it is 7-8 out of 10 were previously it was 10 out of 10.    Review of Systems   Constitutional:  Negative for activity change, appetite change and fever.   HENT:  Negative for trouble swallowing.    Respiratory:  Negative for chest tightness and shortness of breath.    Cardiovascular:  Negative for chest pain.   Gastrointestinal:  Positive for abdominal pain, diarrhea and nausea. Negative for blood in stool and vomiting.   Genitourinary:  Negative for dysuria and hematuria.   Musculoskeletal:  Negative for arthralgias, joint swelling and neck stiffness.   Neurological:  Negative for syncope and light-headedness.   Hematological:  Negative for adenopathy. Does not bruise/bleed easily.   Psychiatric/Behavioral:  Negative for confusion.      Objective:     Vital Signs (Most Recent):  Temp: 98.6 °F (37 °C) (10/08/23 1620)  Pulse: 95 (10/08/23 1620)  Resp: 18 (10/08/23 1821)  BP: 121/71 (10/08/23 1620)  SpO2: 97 % (10/08/23 1620) Vital Signs (24h Range):  Temp:  [98 °F (36.7 °C)-98.6 °F (37 °C)] 98.6 °F (37 °C)  Pulse:  [] 95  Resp:  [16-20] 18  SpO2:  [94 %-97 %] 97 %  BP: (116-128)/(67-81) 121/71     Weight: 72.1 kg (158 lb 15.2 oz)  Body mass index is 22.81 kg/m².    Estimated Creatinine Clearance: 193.6 mL/min (based on SCr of 0.6 mg/dL).     Physical Exam  Constitutional:       Appearance: Normal appearance.   HENT:      Head: Normocephalic and atraumatic.      Nose: Nose normal.      Mouth/Throat:      Mouth: Mucous membranes are moist.      Pharynx: Oropharynx is clear.   Eyes:      Conjunctiva/sclera: Conjunctivae normal.   Cardiovascular:      Rate and Rhythm: Normal rate and regular rhythm.      Pulses: Normal pulses.      Heart sounds: Normal heart sounds.   Pulmonary:      Effort: Pulmonary effort is normal.      Breath sounds: Normal breath sounds.   Abdominal:      General: Bowel sounds are normal.      Palpations: Abdomen is soft.       Tenderness: There is abdominal tenderness. There is no guarding or rebound.      Comments: RUQ and epigastric tenderness on mild palpation   Musculoskeletal:         General: No deformity. Normal range of motion.      Cervical back: Neck supple.   Skin:     General: Skin is warm and dry.      Coloration: Skin is not jaundiced.      Findings: No rash.   Neurological:      Mental Status: He is alert and oriented to person, place, and time. Mental status is at baseline.          Significant Labs:   Microbiology Results (last 7 days)       ** No results found for the last 168 hours. **            Significant Imaging: I have reviewed all pertinent imaging results/findings within the past 24 hours.

## 2023-10-08 NOTE — PLAN OF CARE
AAOX4  VVS  REQUEST PAIN MEDS Q 4 HOURS   SAFETY IN PLACE WITH VERBAL UNDERSTANDING NOTED TO CALL PRN

## 2023-10-08 NOTE — CONSULTS
Spoke to patient regarding midline placement and patient has various concerns about the midline, maintenance, medication route, and etc. Primary team notified. Will close consult for now. Please reconsult is patient is willing to have line placed or if his condition requires other vascular access options.

## 2023-10-08 NOTE — PLAN OF CARE
Patient AAO x4. VSS. On room air. Afebrile. Diet upgraded to full liquid, pt tolerating well. Cefepime continued. PRN pain medication given. Pt up and ambulatory. Bed locked/lowest settings. Call bell in reach. Reminded to call for assistance.

## 2023-10-08 NOTE — TREATMENT PLAN
AES Treatment Plan    Tasia Cardona is a 23 y.o. male admitted to hospital 10/5/2023 (Hospital Day: 4) due to Acute necrotizing pancreatitis.     Interval History  No acute events overnight.  Vital signs stable.  Patient's diet is being advanced.    Objective  Temp:  [98 °F (36.7 °C)-98.9 °F (37.2 °C)] 98 °F (36.7 °C) (10/08 0836)  Pulse:  [] 72 (10/08 0836)  BP: (116-128)/(67-81) 116/70 (10/08 0836)  Resp:  [16-20] 16 (10/08 1032)  SpO2:  [94 %-97 %] 96 % (10/08 0836)        Laboratory    Lab Results   Component Value Date    WBC 6.08 10/08/2023    HGB 9.7 (L) 10/08/2023    HCT 31.8 (L) 10/08/2023    MCV 95 10/08/2023     10/08/2023       Lab Results   Component Value Date     10/08/2023    K 3.6 10/08/2023     10/08/2023    CO2 25 10/08/2023    BUN 3 (L) 10/08/2023    CREATININE 0.6 10/08/2023    CALCIUM 8.7 10/08/2023       Lab Results   Component Value Date    ALBUMIN 2.6 (L) 10/08/2023    ALT 8 (L) 10/08/2023    AST 18 10/08/2023    GGT 61 (H) 10/06/2023    ALKPHOS 65 10/08/2023    BILITOT 0.3 10/08/2023       Lab Results   Component Value Date    INR 1.1 10/06/2023    INR 1.1 09/17/2023         Assessment  Acute necrotizing pancreatitis  23 year old male with medical history of HIV on HAART last CD4 296 on biktarvy, polycythemia getting therapeutic phelbotomy, alcohol use disorder, tobacco use, hepatic steatosis, UGIB secondary to brandy-hitchcock tear (EGD 9/18), 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 He has been having similar pain since 9/20 and has had multiple CTAP showing necrotizing pancreatitis and most recently multilobulated peripancreatic fluid collections adjacent to the pancreatic tail and body, partially encapsulated and suggestive of pseudocysts. The largest inferior component of this collection extends anteriorly and measures 7.4 x 6.5 cm (2-76) and the largest superior component measures 6.5 x 3.3 cm (2-43). Low suspicion  for infected pseudocyst. Now improving clinically.      Problem list:  1. Acute peripancreatic necrosis  2. Recurrent pancreatitis, etiology likely alcohol (PETH 682)  3. Alcohol use disorder  4. Tobacco use  5. Splenic vein thrombus   6. HIV on HAART     Plan  - Will arrange AES clinic follow up in 2 weeks.  - Please order CT pancreas protocol to be done in 2 weeks.   - No endoscopic intervention planned for pancreatic fluid collections given they are immature as the development is less than 4 weeks.  - If patient decompensates, would consider IR consult for drainage.  -  on alcohol and tobacco cessation as they are contributing factors to recurrent pancreatitis episodes.  - Continue supportive care.   - please obtain daily CBC, CMP  - Plan of care was discussed with primary team    Thank you for involving us in the care of Tasia Cardona. Please call with any additional concerns or questions. We will sign off.     Tony Matos MD, PGY-V  Gastroenterology Fellow  [facility]

## 2023-10-09 LAB
POCT GLUCOSE: 103 MG/DL (ref 70–110)
POCT GLUCOSE: 104 MG/DL (ref 70–110)
POCT GLUCOSE: 98 MG/DL (ref 70–110)
POCT GLUCOSE: 99 MG/DL (ref 70–110)

## 2023-10-09 PROCEDURE — 20600001 HC STEP DOWN PRIVATE ROOM

## 2023-10-09 PROCEDURE — 25000003 PHARM REV CODE 250: Performed by: HOSPITALIST

## 2023-10-09 PROCEDURE — 99233 SBSQ HOSP IP/OBS HIGH 50: CPT | Mod: ,,, | Performed by: HOSPITALIST

## 2023-10-09 PROCEDURE — 63600175 PHARM REV CODE 636 W HCPCS: Performed by: HOSPITALIST

## 2023-10-09 PROCEDURE — 25000003 PHARM REV CODE 250: Performed by: STUDENT IN AN ORGANIZED HEALTH CARE EDUCATION/TRAINING PROGRAM

## 2023-10-09 PROCEDURE — 99233 PR SUBSEQUENT HOSPITAL CARE,LEVL III: ICD-10-PCS | Mod: ,,, | Performed by: HOSPITALIST

## 2023-10-09 PROCEDURE — 25000003 PHARM REV CODE 250: Performed by: INTERNAL MEDICINE

## 2023-10-09 RX ORDER — HYDROMORPHONE HYDROCHLORIDE 2 MG/1
2 TABLET ORAL
Status: DISCONTINUED | OUTPATIENT
Start: 2023-10-09 | End: 2023-10-10 | Stop reason: HOSPADM

## 2023-10-09 RX ORDER — LIDOCAINE AND PRILOCAINE 25; 25 MG/G; MG/G
CREAM TOPICAL
Status: DISCONTINUED | OUTPATIENT
Start: 2023-10-09 | End: 2023-10-10 | Stop reason: HOSPADM

## 2023-10-09 RX ADMIN — SENNOSIDES AND DOCUSATE SODIUM 1 TABLET: 50; 8.6 TABLET ORAL at 08:10

## 2023-10-09 RX ADMIN — HYDROMORPHONE HYDROCHLORIDE 2 MG: 2 TABLET ORAL at 11:10

## 2023-10-09 RX ADMIN — FOLIC ACID 1 MG: 1 TABLET ORAL at 08:10

## 2023-10-09 RX ADMIN — PROCHLORPERAZINE EDISYLATE 5 MG: 5 INJECTION INTRAMUSCULAR; INTRAVENOUS at 07:10

## 2023-10-09 RX ADMIN — PROCHLORPERAZINE EDISYLATE 5 MG: 5 INJECTION INTRAMUSCULAR; INTRAVENOUS at 03:10

## 2023-10-09 RX ADMIN — ENOXAPARIN SODIUM 40 MG: 40 INJECTION SUBCUTANEOUS at 04:10

## 2023-10-09 RX ADMIN — POLYETHYLENE GLYCOL 3350 17 G: 17 POWDER, FOR SOLUTION ORAL at 08:10

## 2023-10-09 RX ADMIN — HYDROMORPHONE HYDROCHLORIDE 2 MG: 2 TABLET ORAL at 03:10

## 2023-10-09 RX ADMIN — HYDROCODONE BITARTRATE AND ACETAMINOPHEN 1 TABLET: 10; 325 TABLET ORAL at 12:10

## 2023-10-09 RX ADMIN — HYDROMORPHONE HYDROCHLORIDE 2 MG: 2 TABLET ORAL at 08:10

## 2023-10-09 RX ADMIN — CEFPODOXIME PROXETIL 400 MG: 200 TABLET, FILM COATED ORAL at 08:10

## 2023-10-09 RX ADMIN — HYDROCODONE BITARTRATE AND ACETAMINOPHEN 1 TABLET: 10; 325 TABLET ORAL at 06:10

## 2023-10-09 RX ADMIN — PANTOPRAZOLE SODIUM 40 MG: 40 TABLET, DELAYED RELEASE ORAL at 08:10

## 2023-10-09 RX ADMIN — Medication 6 MG: at 01:10

## 2023-10-09 RX ADMIN — BICTEGRAVIR SODIUM, EMTRICITABINE, AND TENOFOVIR ALAFENAMIDE FUMARATE 1 TABLET: 50; 200; 25 TABLET ORAL at 08:10

## 2023-10-09 RX ADMIN — Medication 6 MG: at 10:10

## 2023-10-09 NOTE — SUBJECTIVE & OBJECTIVE
How Many Skin Cancers Have You Had?: more than one Interval History:   10/9: changing to oral dilaudid. Not wanting midline - discussed that oral ABX would not be best option available.     Review of Systems   Constitutional:  Positive for appetite change, chills and fever.   HENT:  Negative for sinus pressure and sore throat.    Eyes:  Negative for visual disturbance.   Respiratory:  Negative for cough and shortness of breath.    Cardiovascular:  Negative for chest pain and leg swelling.   Gastrointestinal:  Positive for abdominal pain, nausea and vomiting.   Genitourinary:  Negative for dysuria.   Musculoskeletal:  Negative for back pain.   Skin:  Negative for rash.   Neurological:  Positive for weakness.        Pre-syncope   Psychiatric/Behavioral:  Negative for confusion.      Objective:     Vital Signs (Most Recent):  Temp: 98 °F (36.7 °C) (10/09/23 1142)  Pulse: 94 (10/09/23 1142)  Resp: 16 (10/09/23 1214)  BP: 121/82 (10/09/23 1142)  SpO2: 97 % (10/09/23 1142) Vital Signs (24h Range):  Temp:  [97.5 °F (36.4 °C)-99.1 °F (37.3 °C)] 98 °F (36.7 °C)  Pulse:  [94-99] 94  Resp:  [16-20] 16  SpO2:  [93 %-98 %] 97 %  BP: (119-125)/(70-82) 121/82     Weight: 72.1 kg (158 lb 15.2 oz)  Body mass index is 22.81 kg/m².    Intake/Output Summary (Last 24 hours) at 10/9/2023 1358  Last data filed at 10/9/2023 0936  Gross per 24 hour   Intake 250 ml   Output --   Net 250 ml         Physical Exam  Vitals reviewed.   Constitutional:       General: He is not in acute distress.     Appearance: He is not ill-appearing.   Eyes:      General: No scleral icterus.  Cardiovascular:      Rate and Rhythm: Normal rate.   Pulmonary:      Effort: Pulmonary effort is normal. No respiratory distress.   Abdominal:      General: There is no distension.      Tenderness: There is abdominal tenderness. There is no guarding or rebound.   Skin:     Coloration: Skin is not jaundiced.   Neurological:      Mental Status: He is alert. Mental status is at baseline.             Significant Labs: All  What Is The Reason For Today's Visit?: History of Non-Melanoma Skin Cancer pertinent labs within the past 24 hours have been reviewed.    Significant Imaging: I have reviewed all pertinent imaging results/findings within the past 24 hours.   When Was Your Last Cancer Diagnosed?: 2021

## 2023-10-09 NOTE — PROGRESS NOTES
[facility] - Transplant Stepdown  Infectious Disease  Progress Note    Patient Name: Tasia Cardona  MRN: [id number]  Admission Date: 10/5/2023  Length of Stay: 3 days  Attending Physician: Brigida Barrow MD  Primary Care Provider: Pina Jones NP    Isolation Status: No active isolations  Assessment/Plan:      ID  HIV infection  Currently on Biktarvy, has been compliant with medication and receiving it while in patient. Last CD4 count on 9/20 was 296.    Plan  1. Continue on biktarvy.  2. Follow up with Dr. Herrera.    GI  * Acute necrotizing pancreatitis  23 year old with a history of HIV, brandy-hitchcock tear, recent pancreatitis c/b zehra-pancreatic fluid collection who presented for worsening abdominal pain. Recently discharged on 10/4 from [facility] for similar presentation and was placed on Cipro/Flagyl due to fevers and leukocytosis w/ end date of 10/5. He presented here again with intractable pain, leukocytosis and subjective fever, leukocytosis has improved since initiation of antibiotics. CT A/P showed interval development of multi lobulated peripancreatic fluid collection suggesting pancreatic pseudo-cysts. No air was seen in the fluid collection. He was started on meropenem due to penicillin allergy and then transitioned to pip-tazo after ID evaluation, GI evaluated, defer fluid drainage at this time.     Initial plans were for discharge on IV cefepime 2 grams q 12 to complete a 4 week course.  This plan was informed by the fact that he clinically failed oral cipro/flagyl.  Patient now saying that he doesn't want to be on IV antibiotics.  Informed patient of potential risk of failure.  Advised patient to seek medical care if the pain worsened or if he developed fevers.    Plan  1. Discontinue IV cefepime.  2. Start cefpodoxime 400 mg po bid to complete 4 week course.        Anticipated Disposition: TBD    Thank you for your consult. I will sign off. Please contact us if you have any additional  questions.    Rich Velasco MD  Infectious Disease  [facility] - Transplant Stepdown    Subjective:     Principal Problem:Acute necrotizing pancreatitis    HPI: Mr. Cardona is a 23 year old male with a history of with a history of HIV on Biktarvy, Asthma, polycythemia who presents with worsening abdominal pain since discharge from [facility] on 10/4/23. Patient had recent admission on 9/17 for Corinne-Chauhan tear, hematemesis. EGD was performed at that visit after which he developed acute pancreatitis with splenic vein thrombosis. He was treated inpatient with vanc/cefepime and discharged once tolerating diet. He was admitted to [facility] on 9/29 for intractable abdominal pain, CT at that time was showing large encapsulating peripancreatic fluid collection. He was treated w/ meropenem and discharged w/ Cipro/Flagyl w/ end date of 10/5. Patient returned on 10/5 again with worsening abdominal pain. CT abd/pelvis showed development of multi lobulated peripancreatic fluid collections appearing to communicate with one another. Labs significant for wbc of 23.83.  He was treated with cefepime/flagyl, morphine, ketamine in the ED and admitted. He is currently continued on meropenem. Patient denies any alcohol use since episode of pancreatitis, has been complaint with his Biktarvy. Establishing care with Dr. Herrera for HIV management.            Interval History: Continues to have abdominal pain.  States it is 7-8 out of 10 were previously it was 10 out of 10.    Review of Systems   Constitutional:  Negative for activity change, appetite change and fever.   HENT:  Negative for trouble swallowing.    Respiratory:  Negative for chest tightness and shortness of breath.    Cardiovascular:  Negative for chest pain.   Gastrointestinal:  Positive for abdominal pain, diarrhea and nausea. Negative for blood in stool and vomiting.   Genitourinary:  Negative for dysuria and hematuria.   Musculoskeletal:  Negative for arthralgias, joint swelling  and neck stiffness.   Neurological:  Negative for syncope and light-headedness.   Hematological:  Negative for adenopathy. Does not bruise/bleed easily.   Psychiatric/Behavioral:  Negative for confusion.      Objective:     Vital Signs (Most Recent):  Temp: 98.6 °F (37 °C) (10/08/23 1620)  Pulse: 95 (10/08/23 1620)  Resp: 18 (10/08/23 1821)  BP: 121/71 (10/08/23 1620)  SpO2: 97 % (10/08/23 1620) Vital Signs (24h Range):  Temp:  [98 °F (36.7 °C)-98.6 °F (37 °C)] 98.6 °F (37 °C)  Pulse:  [] 95  Resp:  [16-20] 18  SpO2:  [94 %-97 %] 97 %  BP: (116-128)/(67-81) 121/71     Weight: 72.1 kg (158 lb 15.2 oz)  Body mass index is 22.81 kg/m².    Estimated Creatinine Clearance: 193.6 mL/min (based on SCr of 0.6 mg/dL).     Physical Exam  Constitutional:       Appearance: Normal appearance.   HENT:      Head: Normocephalic and atraumatic.      Nose: Nose normal.      Mouth/Throat:      Mouth: Mucous membranes are moist.      Pharynx: Oropharynx is clear.   Eyes:      Conjunctiva/sclera: Conjunctivae normal.   Cardiovascular:      Rate and Rhythm: Normal rate and regular rhythm.      Pulses: Normal pulses.      Heart sounds: Normal heart sounds.   Pulmonary:      Effort: Pulmonary effort is normal.      Breath sounds: Normal breath sounds.   Abdominal:      General: Bowel sounds are normal.      Palpations: Abdomen is soft.      Tenderness: There is abdominal tenderness. There is no guarding or rebound.      Comments: RUQ and epigastric tenderness on mild palpation   Musculoskeletal:         General: No deformity. Normal range of motion.      Cervical back: Neck supple.   Skin:     General: Skin is warm and dry.      Coloration: Skin is not jaundiced.      Findings: No rash.   Neurological:      Mental Status: He is alert and oriented to person, place, and time. Mental status is at baseline.          Significant Labs:   Microbiology Results (last 7 days)       ** No results found for the last 168 hours. **             Significant Imaging: I have reviewed all pertinent imaging results/findings within the past 24 hours.

## 2023-10-09 NOTE — ASSESSMENT & PLAN NOTE
23 year old with a history of HIV, brandy-hitchcock tear, recent pancreatitis c/b zehra-pancreatic fluid collection who presented for worsening abdominal pain. Recently discharged on 10/4 from [facility] for similar presentation and was placed on Cipro/Flagyl due to fevers and leukocytosis w/ end date of 10/5. He presented here again with intractable pain, leukocytosis and subjective fever, leukocytosis has improved since initiation of antibiotics. CT A/P showed interval development of multi lobulated peripancreatic fluid collection suggesting pancreatic pseudo-cysts. No air was seen in the fluid collection. He was started on meropenem due to penicillin allergy and then transitioned to pip-tazo after ID evaluation, GI evaluated, defer fluid drainage at this time.     Initial plans were for discharge on IV cefepime 2 grams q 12 to complete a 4 week course.  This plan was informed by the fact that he clinically failed oral cipro/flagyl.  Patient now saying that he doesn't want to be on IV antibiotics.  Informed patient of potential risk of failure.  Advised patient to seek medical care if the pain worsened or if he developed fevers.    Plan  1. Discontinue IV cefepime.  2. Start cefpodoxime 400 mg po bid to complete 4 week course.

## 2023-10-09 NOTE — PLAN OF CARE
Plan of care reviewed with pt at bedside. Pt remained alert and oriented x4, on room air, ambulatory on own and complaints of pain not adequately treated with Norco. Pain med changed to Dilaudid 2mg PO. Pt did agree to have midline for at home IV antibx as long as he can have lidocaine cream prior to insertion. MD notified but decided to wait until tomorrow to assess.     Problem: Adult Inpatient Plan of Care  Goal: Plan of Care Review  Outcome: Ongoing, Progressing  Goal: Patient-Specific Goal (Individualized)  Outcome: Ongoing, Progressing  Goal: Absence of Hospital-Acquired Illness or Injury  Outcome: Ongoing, Progressing  Goal: Optimal Comfort and Wellbeing  Outcome: Ongoing, Progressing  Goal: Readiness for Transition of Care  Outcome: Ongoing, Progressing     Problem: Infection  Goal: Absence of Infection Signs and Symptoms  Outcome: Ongoing, Progressing

## 2023-10-09 NOTE — ASSESSMENT & PLAN NOTE
Currently on Biktarvy, has been compliant with medication and receiving it while in patient. Last CD4 count on 9/20 was 296.    Plan  1. Continue on biktarvy.  2. Follow up with Dr. Herrera.

## 2023-10-09 NOTE — PLAN OF CARE
Patient AAO x4. VSS. On room air. Afebrile. Diet upgraded to regular, pt tolerating well. PRN pain medication given. Dilaudid d/c; now has PO norco. IV abx switched to PO. Pt up and ambulatory. Bed locked/lowest settings. Call bell in reach. Reminded to call for assistance.

## 2023-10-09 NOTE — PROGRESS NOTES
[facility] - Transplant [facility] Medicine  Progress Note    Patient Name: Tasia Cardona  MRN: [id number]  Patient Class: IP- Inpatient   Admission Date: 10/5/2023  Length of Stay: 4 days  Attending Physician: Brigida Barrow MD  Primary Care Provider: Pina Jones NP        Subjective:     Principal Problem:Acute necrotizing pancreatitis        HPI:  23-year-old man with a history of HIV on HAART, asthma, polycythemia presents to the emergency department with complaints of uncontrolled abdominal pain diagnosis of necrotizing pancreatitis.    Recent history is that patient was admitted to [facility] [phone], presented with concerns for hematemesis Chauhan tear, post EGD he developed worsening abdominal pain, CT imaging obtained in consistent with acute necrotizing pancreatitis with splenic vein thrombosis.  Hematology consulted and anticoagulation not recommended for splenic vein thrombosis, he received 2 days of empiric IV antibiotic therapy with vancomycin and cefepime, Infectious Disease was consulted.  Prior to discharge patient was tolerating oral diet    He was then admitted to [facility] on September 29th to October 4th.  Presenting symptoms were uncontrolled abdominal pain and fevers CT scan demonstrated sequela of necrotizing pancreatitis with large encapsulating peripancreatic fluid collection.  He was started on empiric meropenem until October 2nd transition to oral ciprofloxacin and Flagyl and discharged with with oral antibiotic prescription.  Patient reported pain persisted he was discharged with oral oxycodone for pain management.    He went home on October 4th approximately around 3:00 p.m. drink only some liquids that evening and the morning of October 5th he had a smoothie, eggs and cinnamon toast for breakfast took a nap around 11:00 a.m. and woke up at 1:00 p.m. with severe abdominal pain in the epigastrium and upper quadrants.  He states while taking shower he felt  presyncopal did not fall and sat on the ground, decided to return to the hospital felt he may have been discharged too soon do [facility] hospitalization patient had PETH checked - value @ 682, suspected to be etiology for pancreatitis.  Today patient says he has had no alcohol use since prior to initial September hospitalization.   He smokes cigarettes approx 1 pack every 2-3/weeks.     With repeat vomiting today he did not have any hematemesis.   At discharge on October 4th white count was 11 and today is elevated at 23.  Pain was so severe in the emergency department that he required ketamine 20mg dose to help improve symptoms.     ED Treatment; Cefepime,  Droperidol, LR 1L, NS 1L, Morphine 4mg x 2, Flagyl 500mg, Zofran.   Ketamine 20mg.       Overview/Hospital Course:  No notes on file    Interval History:   10/9: changing to oral dilaudid. Not wanting midline - discussed that oral ABX would not be best option available.     Review of Systems   Constitutional:  Positive for appetite change, chills and fever.   HENT:  Negative for sinus pressure and sore throat.    Eyes:  Negative for visual disturbance.   Respiratory:  Negative for cough and shortness of breath.    Cardiovascular:  Negative for chest pain and leg swelling.   Gastrointestinal:  Positive for abdominal pain, nausea and vomiting.   Genitourinary:  Negative for dysuria.   Musculoskeletal:  Negative for back pain.   Skin:  Negative for rash.   Neurological:  Positive for weakness.        Pre-syncope   Psychiatric/Behavioral:  Negative for confusion.      Objective:     Vital Signs (Most Recent):  Temp: 98 °F (36.7 °C) (10/09/23 1142)  Pulse: 94 (10/09/23 1142)  Resp: 16 (10/09/23 1214)  BP: 121/82 (10/09/23 1142)  SpO2: 97 % (10/09/23 1142) Vital Signs (24h Range):  Temp:  [97.5 °F (36.4 °C)-99.1 °F (37.3 °C)] 98 °F (36.7 °C)  Pulse:  [94-99] 94  Resp:  [16-20] 16  SpO2:  [93 %-98 %] 97 %  BP: (119-125)/(70-82) 121/82     Weight: 72.1 kg (158 lb 15.2  oz)  Body mass index is 22.81 kg/m².    Intake/Output Summary (Last 24 hours) at 10/9/2023 1358  Last data filed at 10/9/2023 0936  Gross per 24 hour   Intake 250 ml   Output --   Net 250 ml         Physical Exam  Vitals reviewed.   Constitutional:       General: He is not in acute distress.     Appearance: He is not ill-appearing.   Eyes:      General: No scleral icterus.  Cardiovascular:      Rate and Rhythm: Normal rate.   Pulmonary:      Effort: Pulmonary effort is normal. No respiratory distress.   Abdominal:      General: There is no distension.      Tenderness: There is abdominal tenderness. There is no guarding or rebound.   Skin:     Coloration: Skin is not jaundiced.   Neurological:      Mental Status: He is alert. Mental status is at baseline.             Significant Labs: All pertinent labs within the past 24 hours have been reviewed.    Significant Imaging: I have reviewed all pertinent imaging results/findings within the past 24 hours.      Assessment/Plan:      * Acute necrotizing pancreatitis  Patient with necrotizing pancreatitis and now sequelae of large pseudocyst concerning for infection  -Admitted to [facility] from 9/29-10/04 - received empiric Meropenem until 10/02 then transitioned to Cirpo/Flagyl, on 10/4 WBC was 11 - 1 day from discharge patient presents for readmission and WBC 23.   -BLood cultures obtained   -Empiric Meropenem reordered  Infectious disease consult ordered for restricted abx use  -AES consult ordered - evaluate new imaging - is fluid collection/?abscess amenable to endoscopic drainage or should  Percutaneous aspiration vs drainage be pursued  For source control/cultures.     Continue IV antiemetics, lower rate IV fluids, IV opioid pain medications - increasing to 2mg IV dilaudid PRn for severe,  1mg IV prn for moderate pain.     Glucose is 150s - check A1c in AM, pt potentially at high risk to develop diabetes due to destruction from severity of pancreatitis.     Keep NPO except  "sips - given repeat admissions - patient may require low rate tube feed enteral nutrition vs parenteral nutrition - discuss with GI.     Alcohol use disorder  Reported in [facility] record patient with PETH level elevated 680s.  He reports last alcohol use is prior to initial admission with pancreatitis 9/17.   -      Pancreatic abscess  As per primary problem.       Splenic vein thrombosis  Complication from necrotizing pancreatitis - not on therapeutic anticoagulation  Continue DVT ppx dose enoxaparin      Corinne-Chauhan tear  Present during 9/17 admission s/p EGD  With recurrent vomiting today he denies any hematemsis  Continue PPI   -Recent EGD Pathology results show   A. DUODENUM, BIOPSY:   - Duodenal mucosa with focal gastric metaplasia and focal superficial erosion, see comment.   - No evidence of infection is identified.   - No evidence of villous architectural distortion, increased intraepithelial lymphocytes, active inflammation, granulomas or malignancy.   - Multiple levels examined.     B. STOMACH, ANTRUM, BIOPSY:   - Antral mucosa with changes suggestive of reactive (chemical) gastropathy).   - No Helicobacter-type organisms identified on the *Helicobacter stain.         Hepatitis C antibody positive in blood  HCV AB positive.  HCV viral load negative  Refer to hepatology as outpatient.     Polycythemia  Chronic - stable - follows with hematology as outpatient - labs show anemia at this time.       HIV infection  This patient in known to have HIV+ status (Have detected HIV PCR but never CD4 <200 or AIDS defining illness). Labs reviewed- No results found for: "CD4", No results found for: "HIVDNAPCR". Patient is on HAART. Will continue HAART. Prophylaxis was not indicated at this time- . Continue to monitor routine labs. Last CD4 count was   Lab Results   Component Value Date    ABSOLUTECD4 296 (L) 09/20/2023       We will consult Infectious disease at this time. Evaluate and treat HIV-associated diseases as " indicated by specific problems listed below.       VTE Risk Mitigation (From admission, onward)         Ordered     enoxaparin injection 40 mg  Daily         10/06/23 0124     IP VTE HIGH RISK PATIENT  Once         10/06/23 0124     Place sequential compression device  Until discontinued         10/06/23 0124                Discharge Planning   CASTILLO: 10/11/2023     Code Status: Full Code   Is the patient medically ready for discharge?: No    Reason for patient still in hospital (select all that apply): Patient trending condition  Discharge Plan A: Home                  Brigida Barrow MD  Department of Hospital Medicine   [facility] - Transplant Stepdown

## 2023-10-10 VITALS
RESPIRATION RATE: 18 BRPM | BODY MASS INDEX: 22.75 KG/M2 | DIASTOLIC BLOOD PRESSURE: 67 MMHG | TEMPERATURE: 98 F | HEIGHT: 70 IN | WEIGHT: 158.94 LBS | OXYGEN SATURATION: 96 % | SYSTOLIC BLOOD PRESSURE: 128 MMHG | HEART RATE: 88 BPM

## 2023-10-10 LAB — POCT GLUCOSE: 97 MG/DL (ref 70–110)

## 2023-10-10 PROCEDURE — 76937 US GUIDE VASCULAR ACCESS: CPT

## 2023-10-10 PROCEDURE — 99233 PR SUBSEQUENT HOSPITAL CARE,LEVL III: ICD-10-PCS | Mod: ,,, | Performed by: HOSPITALIST

## 2023-10-10 PROCEDURE — 25000003 PHARM REV CODE 250: Performed by: HOSPITALIST

## 2023-10-10 PROCEDURE — 63600175 PHARM REV CODE 636 W HCPCS: Performed by: HOSPITALIST

## 2023-10-10 PROCEDURE — 36410 VNPNXR 3YR/> PHY/QHP DX/THER: CPT

## 2023-10-10 PROCEDURE — C1751 CATH, INF, PER/CENT/MIDLINE: HCPCS

## 2023-10-10 PROCEDURE — 99233 SBSQ HOSP IP/OBS HIGH 50: CPT | Mod: ,,, | Performed by: HOSPITALIST

## 2023-10-10 RX ORDER — HYDROMORPHONE HYDROCHLORIDE 1 MG/ML
1 INJECTION, SOLUTION INTRAMUSCULAR; INTRAVENOUS; SUBCUTANEOUS
Status: DISCONTINUED | OUTPATIENT
Start: 2023-10-10 | End: 2023-10-10 | Stop reason: HOSPADM

## 2023-10-10 RX ORDER — HYDROMORPHONE HYDROCHLORIDE 2 MG/1
2 TABLET ORAL
Qty: 30 TABLET | Refills: 0 | Status: ON HOLD | OUTPATIENT
Start: 2023-10-10 | End: 2023-10-19 | Stop reason: HOSPADM

## 2023-10-10 RX ORDER — BICTEGRAVIR SODIUM, EMTRICITABINE, AND TENOFOVIR ALAFENAMIDE FUMARATE 50; 200; 25 MG/1; MG/1; MG/1
1 TABLET ORAL DAILY
Start: 2023-10-11 | End: 2023-11-16 | Stop reason: SDUPTHER

## 2023-10-10 RX ADMIN — FOLIC ACID 1 MG: 1 TABLET ORAL at 08:10

## 2023-10-10 RX ADMIN — HYDROMORPHONE HYDROCHLORIDE 2 MG: 2 TABLET ORAL at 08:10

## 2023-10-10 RX ADMIN — HYDROMORPHONE HYDROCHLORIDE 2 MG: 2 TABLET ORAL at 03:10

## 2023-10-10 RX ADMIN — PANTOPRAZOLE SODIUM 40 MG: 40 TABLET, DELAYED RELEASE ORAL at 08:10

## 2023-10-10 RX ADMIN — SENNOSIDES AND DOCUSATE SODIUM 1 TABLET: 50; 8.6 TABLET ORAL at 08:10

## 2023-10-10 RX ADMIN — POLYETHYLENE GLYCOL 3350 17 G: 17 POWDER, FOR SOLUTION ORAL at 08:10

## 2023-10-10 RX ADMIN — CEFEPIME 2 G: 2 INJECTION, POWDER, FOR SOLUTION INTRAVENOUS at 11:10

## 2023-10-10 RX ADMIN — HYDROMORPHONE HYDROCHLORIDE 1 MG: 1 INJECTION, SOLUTION INTRAMUSCULAR; INTRAVENOUS; SUBCUTANEOUS at 11:10

## 2023-10-10 RX ADMIN — BICTEGRAVIR SODIUM, EMTRICITABINE, AND TENOFOVIR ALAFENAMIDE FUMARATE 1 TABLET: 50; 200; 25 TABLET ORAL at 09:10

## 2023-10-10 NOTE — PLAN OF CARE
Pt aaox4. VS stable. Afebrile. Room air. Pt co/ pain and tenderness on right side. Po dilaudid q3 hours. A midline consult was placed and provider will assess him today for further evaluation to transition to IV antibiotics from po antibiotics.Pt reported nausea and prn compazine administered. Pt independent and ambulated to bathroom w/o assistance. Accuchecks q 8 with bg levels 98 and 97.

## 2023-10-10 NOTE — PROGRESS NOTES
[facility] - Transplant [facility] Medicine  Progress Note    Patient Name: Tasia Cardona  MRN: [id number]  Patient Class: IP- Inpatient   Admission Date: 10/5/2023  Length of Stay: 5 days  Attending Physician: Brigida Barrow MD  Primary Care Provider: Pina Jones NP        Subjective:     Principal Problem:Acute necrotizing pancreatitis        HPI:  23-year-old man with a history of HIV on HAART, asthma, polycythemia presents to the emergency department with complaints of uncontrolled abdominal pain diagnosis of necrotizing pancreatitis.    Recent history is that patient was admitted to [facility] [phone], presented with concerns for hematemesis Chauhan tear, post EGD he developed worsening abdominal pain, CT imaging obtained in consistent with acute necrotizing pancreatitis with splenic vein thrombosis.  Hematology consulted and anticoagulation not recommended for splenic vein thrombosis, he received 2 days of empiric IV antibiotic therapy with vancomycin and cefepime, Infectious Disease was consulted.  Prior to discharge patient was tolerating oral diet    He was then admitted to [facility] on September 29th to October 4th.  Presenting symptoms were uncontrolled abdominal pain and fevers CT scan demonstrated sequela of necrotizing pancreatitis with large encapsulating peripancreatic fluid collection.  He was started on empiric meropenem until October 2nd transition to oral ciprofloxacin and Flagyl and discharged with with oral antibiotic prescription.  Patient reported pain persisted he was discharged with oral oxycodone for pain management.    He went home on October 4th approximately around 3:00 p.m. drink only some liquids that evening and the morning of October 5th he had a smoothie, eggs and cinnamon toast for breakfast took a nap around 11:00 a.m. and woke up at 1:00 p.m. with severe abdominal pain in the epigastrium and upper quadrants.  He states while taking shower he felt  presyncopal did not fall and sat on the ground, decided to return to the hospital felt he may have been discharged too soon do [facility] hospitalization patient had PETH checked - value @ 682, suspected to be etiology for pancreatitis.  Today patient says he has had no alcohol use since prior to initial September hospitalization.   He smokes cigarettes approx 1 pack every 2-3/weeks.     With repeat vomiting today he did not have any hematemesis.   At discharge on October 4th white count was 11 and today is elevated at 23.  Pain was so severe in the emergency department that he required ketamine 20mg dose to help improve symptoms.     ED Treatment; Cefepime,  Droperidol, LR 1L, NS 1L, Morphine 4mg x 2, Flagyl 500mg, Zofran.   Ketamine 20mg.       Overview/Hospital Course:  No notes on file    Interval History:   10/10: medically appropriate for discharge today     Review of Systems   Constitutional:  Positive for appetite change, chills and fever.   HENT:  Negative for sinus pressure and sore throat.    Eyes:  Negative for visual disturbance.   Respiratory:  Negative for cough and shortness of breath.    Cardiovascular:  Negative for chest pain and leg swelling.   Gastrointestinal:  Positive for abdominal pain, nausea and vomiting.   Genitourinary:  Negative for dysuria.   Musculoskeletal:  Negative for back pain.   Skin:  Negative for rash.   Neurological:  Positive for weakness.        Pre-syncope   Psychiatric/Behavioral:  Negative for confusion.      Objective:     Vital Signs (Most Recent):  Temp: 98.4 °F (36.9 °C) (10/10/23 1143)  Pulse: 88 (10/10/23 1143)  Resp: 18 (10/10/23 1143)  BP: 128/67 (10/10/23 1143)  SpO2: 96 % (10/10/23 1143) Vital Signs (24h Range):  Temp:  [96.6 °F (35.9 °C)-98.4 °F (36.9 °C)] 98.4 °F (36.9 °C)  Pulse:  [88-99] 88  Resp:  [14-18] 18  SpO2:  [96 %-98 %] 96 %  BP: (113-128)/(64-72) 128/67     Weight: 72.1 kg (158 lb 15.2 oz)  Body mass index is 22.81 kg/m².    Intake/Output Summary (Last  24 hours) at 10/10/2023 1226  Last data filed at 10/9/2023 2221  Gross per 24 hour   Intake 240 ml   Output --   Net 240 ml         Physical Exam  Vitals reviewed.   Constitutional:       General: He is not in acute distress.     Appearance: He is not ill-appearing.   Eyes:      General: No scleral icterus.  Cardiovascular:      Rate and Rhythm: Normal rate.   Pulmonary:      Effort: Pulmonary effort is normal. No respiratory distress.   Abdominal:      General: There is no distension.      Tenderness: There is abdominal tenderness. There is no guarding or rebound.   Skin:     Coloration: Skin is not jaundiced.   Neurological:      Mental Status: He is alert. Mental status is at baseline.             Significant Labs: All pertinent labs within the past 24 hours have been reviewed.    Significant Imaging: I have reviewed all pertinent imaging results/findings within the past 24 hours.      Assessment/Plan:      * Acute necrotizing pancreatitis  Patient with necrotizing pancreatitis and now sequelae of large pseudocyst concerning for infection  -Admitted to [facility] from 9/29-10/04 - received empiric Meropenem until 10/02 then transitioned to Cirpo/Flagyl, on 10/4 WBC was 11 - 1 day from discharge patient presents for readmission and WBC 23.   -BLood cultures obtained   -Empiric Meropenem reordered  Infectious disease consult ordered for restricted abx use  -AES consult ordered - evaluate new imaging - is fluid collection/?abscess amenable to endoscopic drainage or should  Percutaneous aspiration vs drainage be pursued  For source control/cultures.     Continue IV antiemetics, lower rate IV fluids, IV opioid pain medications - increasing to 2mg IV dilaudid PRn for severe,  1mg IV prn for moderate pain.     Glucose is 150s - check A1c in AM, pt potentially at high risk to develop diabetes due to destruction from severity of pancreatitis.     Keep NPO except sips - given repeat admissions - patient may require low rate tube  "feed enteral nutrition vs parenteral nutrition - discuss with GI.     Alcohol use disorder  [place] [facility] [place] with PETH level elevated 680s.  He reports last alcohol use is prior to initial admission with pancreatitis 9/17.   -      Pancreatic abscess  As per primary problem.       Splenic vein thrombosis  Complication from necrotizing pancreatitis - not on therapeutic anticoagulation  Continue DVT ppx dose enoxaparin      Corinne-Chauhan tear  Present during 9/17 admission s/p EGD  With recurrent vomiting today he denies any hematemsis  Continue PPI   -Recent EGD Pathology results show   A. DUODENUM, BIOPSY:   - Duodenal mucosa with focal gastric metaplasia and focal superficial erosion, see comment.   - No evidence of infection is identified.   - No evidence of villous architectural distortion, increased intraepithelial lymphocytes, active inflammation, granulomas or malignancy.   - Multiple levels examined.     B. STOMACH, ANTRUM, BIOPSY:   - Antral mucosa with changes suggestive of reactive (chemical) gastropathy).   - No Helicobacter-type organisms identified on the *Helicobacter stain.         Hepatitis C antibody positive in blood  HCV AB positive.  HCV viral load negative  Refer to hepatology as outpatient.     Polycythemia  Chronic - stable - follows with hematology as outpatient - labs show anemia at this time.       HIV infection  This patient in known to have HIV+ status (Have detected HIV PCR but never CD4 <200 or AIDS defining illness). Labs reviewed- No results found for: "CD4", No results found for: "HIVDNAPCR". Patient is on HAART. Will continue HAART. Prophylaxis was not indicated at this time- . Continue to monitor routine labs. Last CD4 count was   Lab Results   Component Value Date    ABSOLUTECD4 296 (L) 09/20/2023       We will consult Infectious disease at this time. Evaluate and treat HIV-associated diseases as indicated by specific problems listed below.       VTE Risk Mitigation " (From admission, onward)         Ordered     enoxaparin injection 40 mg  Daily         10/06/23 0124     IP VTE HIGH RISK PATIENT  Once         10/06/23 0124     Place sequential compression device  Until discontinued         10/06/23 0124                Discharge Planning   CASTILLO: 10/10/2023     Code Status: Full Code   Is the patient medically ready for discharge?: No    Reason for patient still in hospital (select all that apply): Patient trending condition  Discharge Plan A: Home                  Brigida Barrow MD  Department of Hospital Medicine   [facility] - Transplant Stepdown

## 2023-10-10 NOTE — CONSULTS
Single lumen 18G x 10cm midline placed right brachial vein. Max dwell date 08Nov2023 and first dressing change must be completed no later than 17Oct2023, Lot# [id number].  Needle advanced into the vessel under real time ultrasound guidance.  Image recorded and saved.

## 2023-10-10 NOTE — SUBJECTIVE & OBJECTIVE
Interval History:   10/10: medically appropriate for discharge today     Review of Systems   Constitutional:  Positive for appetite change, chills and fever.   HENT:  Negative for sinus pressure and sore throat.    Eyes:  Negative for visual disturbance.   Respiratory:  Negative for cough and shortness of breath.    Cardiovascular:  Negative for chest pain and leg swelling.   Gastrointestinal:  Positive for abdominal pain, nausea and vomiting.   Genitourinary:  Negative for dysuria.   Musculoskeletal:  Negative for back pain.   Skin:  Negative for rash.   Neurological:  Positive for weakness.        Pre-syncope   Psychiatric/Behavioral:  Negative for confusion.      Objective:     Vital Signs (Most Recent):  Temp: 98.4 °F (36.9 °C) (10/10/23 1143)  Pulse: 88 (10/10/23 1143)  Resp: 18 (10/10/23 1143)  BP: 128/67 (10/10/23 1143)  SpO2: 96 % (10/10/23 1143) Vital Signs (24h Range):  Temp:  [96.6 °F (35.9 °C)-98.4 °F (36.9 °C)] 98.4 °F (36.9 °C)  Pulse:  [88-99] 88  Resp:  [14-18] 18  SpO2:  [96 %-98 %] 96 %  BP: (113-128)/(64-72) 128/67     Weight: 72.1 kg (158 lb 15.2 oz)  Body mass index is 22.81 kg/m².    Intake/Output Summary (Last 24 hours) at 10/10/2023 1226  Last data filed at 10/9/2023 2221  Gross per 24 hour   Intake 240 ml   Output --   Net 240 ml         Physical Exam  Vitals reviewed.   Constitutional:       General: He is not in acute distress.     Appearance: He is not ill-appearing.   Eyes:      General: No scleral icterus.  Cardiovascular:      Rate and Rhythm: Normal rate.   Pulmonary:      Effort: Pulmonary effort is normal. No respiratory distress.   Abdominal:      General: There is no distension.      Tenderness: There is abdominal tenderness. There is no guarding or rebound.   Skin:     Coloration: Skin is not jaundiced.   Neurological:      Mental Status: He is alert. Mental status is at baseline.             Significant Labs: All pertinent labs within the past 24 hours have been  reviewed.    Significant Imaging: I have reviewed all pertinent imaging results/findings within the past 24 hours.

## 2023-10-10 NOTE — PLAN OF CARE
A message was sent by Shayla to the Gastro team to get the patient an appointment.  They will contact the patient with a date.

## 2023-10-10 NOTE — PLAN OF CARE
PETR scheduled the [facility] hospital follow up with [facility] for November 2 @ 11:30am

## 2023-10-10 NOTE — PLAN OF CARE
10/10/23 0910   Post-Acute Status   Post-Acute Authorization IV Infusion   IV Infusion Status Referral(s) sent     The SW faxed the patient's referral for IV ABX to [employer] via [employer] for review.     9:23 AM  The SW faxed updated plan of care to [employer] via [employer] for review.     The SW received a phone call from Gisele with [employer] stated that Inna was running the patient's insurance. Per Gisele, he was going to come out and train the patient at bedside to start the process.     12:58 PM  The SW tried to contact Gisele at [phone] to follow-up regarding the patient's status with referral but he stated that he had to call the SW back. Gisele stated that he was in with a patient and he would call the SW back.     1:29 PM  The SW received a phone call from Gisele stating that he was on his way to teach the patient. Gisele informed the SW that he would give the SW a call once anything has been completed.     2:28 PM  The SW received a phone call from Gisele with [employer] stated that the patient has been successfully set -up with [employer]. Gisele reported that the patient has taught at bedside and the patient's meds are being delivered to the patient's home.  The CM notified the patient's care team via secure chat.     The SW will continue to follow.     Momo Zuniga LMSW  Case Management [facility]

## 2023-10-10 NOTE — PLAN OF CARE
MIKIE notified by Shan Elizalde that the patient is accepted for service and that he is covered at 100%   MIKIE told Fide that the patient does not have a midline at this time but that one was ordered   CM WCTM

## 2023-10-10 NOTE — PLAN OF CARE
[facility] - Transplant Stepdown  Discharge Final Note    Primary Care Provider: Pina Jones NP    Expected Discharge Date: 10/10/2023    Final Discharge Note (most recent)       Final Note - 10/10/23 1258          Final Note    Assessment Type Final Discharge Note     Anticipated Discharge Disposition IV Therapy Provider        Post-Acute Status    Post-Acute Authorization IV Infusion     Medication Status Set-up complete/Auth obtained     IV Infusion Status Set-up Complete/Auth obtained     Discharge Delays None known at this time                     Important Message from Medicare             Contact Info       Pina Jones NP   Specialty: Family Medicine   Relationship: PCP - General    [address]  [facility] [address]   Phone: [phone]       Next Steps: Schedule an appointment as soon as possible for a visit    [facility] [address]   Specialty: Gastroenterology    [address]  [facility] [address]   Phone: [phone]       Next Steps: Schedule an appointment as soon as possible for a visit          Plan is for patient to discharge home with IV THERAPY provided by [facility]  Patient states that he will [facility] home

## 2023-10-10 NOTE — PROGRESS NOTES
Patient dcd per md order. No acute distress noted. AVS given to the patient. Patients vitals stable. PIV dcd, patient will go with midline. Per care team, meds will be delivered to the patients house tonight. Pain medication delivered to the bedside, patient educated on pain medication. Patient declined transport, awaiting ride to [place].

## 2023-10-12 ENCOUNTER — HOSPITAL ENCOUNTER (INPATIENT)
Facility: HOSPITAL | Age: 23
LOS: 7 days | Discharge: HOME | DRG: 439 | End: 2023-10-19
Attending: EMERGENCY MEDICINE | Admitting: HOSPITALIST
Payer: MEDICAID

## 2023-10-12 ENCOUNTER — PATIENT MESSAGE (OUTPATIENT)
Dept: HEMATOLOGY/ONCOLOGY | Facility: CLINIC | Age: 23
End: 2023-10-12

## 2023-10-12 DIAGNOSIS — R07.9 CHEST PAIN: ICD-10-CM

## 2023-10-12 DIAGNOSIS — D64.9 NORMOCYTIC ANEMIA: Chronic | ICD-10-CM

## 2023-10-12 DIAGNOSIS — B20 HIV INFECTION, UNSPECIFIED SYMPTOM STATUS: Primary | ICD-10-CM

## 2023-10-12 DIAGNOSIS — F10.90 ALCOHOL USE DISORDER: ICD-10-CM

## 2023-10-12 DIAGNOSIS — R11.2 NAUSEA AND VOMITING, UNSPECIFIED VOMITING TYPE: ICD-10-CM

## 2023-10-12 DIAGNOSIS — K86.1 OTHER CHRONIC PANCREATITIS: ICD-10-CM

## 2023-10-12 DIAGNOSIS — J45.20 MILD INTERMITTENT ASTHMA WITHOUT COMPLICATION: Chronic | ICD-10-CM

## 2023-10-12 DIAGNOSIS — R00.0 TACHYCARDIA: ICD-10-CM

## 2023-10-12 DIAGNOSIS — R65.10 SIRS (SYSTEMIC INFLAMMATORY RESPONSE SYNDROME): ICD-10-CM

## 2023-10-12 DIAGNOSIS — D45 POLYCYTHEMIA VERA: ICD-10-CM

## 2023-10-12 DIAGNOSIS — R10.9 ABDOMINAL PAIN: ICD-10-CM

## 2023-10-12 DIAGNOSIS — R10.9 CONTINUOUS SEVERE ABDOMINAL PAIN: ICD-10-CM

## 2023-10-12 DIAGNOSIS — I82.890 SPLENIC VEIN THROMBOSIS: ICD-10-CM

## 2023-10-12 LAB
ALBUMIN SERPL BCP-MCNC: 3.5 G/DL (ref 3.5–5.2)
ALP SERPL-CCNC: 81 U/L (ref 55–135)
ALT SERPL W/O P-5'-P-CCNC: 26 U/L (ref 10–44)
ANION GAP SERPL CALC-SCNC: 13 MMOL/L (ref 8–16)
AST SERPL-CCNC: 42 U/L (ref 10–40)
BASOPHILS # BLD AUTO: 0.06 K/UL (ref 0–0.2)
BASOPHILS NFR BLD: 0.5 % (ref 0–1.9)
BILIRUB SERPL-MCNC: 0.3 MG/DL (ref 0.1–1)
BILIRUB UR QL STRIP: NEGATIVE
BUN SERPL-MCNC: 4 MG/DL (ref 6–30)
BUN SERPL-MCNC: 5 MG/DL (ref 6–20)
CALCIUM SERPL-MCNC: 9.5 MG/DL (ref 8.7–10.5)
CHLORIDE SERPL-SCNC: 105 MMOL/L (ref 95–110)
CHLORIDE SERPL-SCNC: 107 MMOL/L (ref 95–110)
CLARITY UR REFRACT.AUTO: CLEAR
CO2 SERPL-SCNC: 20 MMOL/L (ref 23–29)
COLOR UR AUTO: YELLOW
CREAT SERPL-MCNC: 0.5 MG/DL (ref 0.5–1.4)
CREAT SERPL-MCNC: 0.7 MG/DL (ref 0.5–1.4)
DIFFERENTIAL METHOD: ABNORMAL
EOSINOPHIL # BLD AUTO: 0.4 K/UL (ref 0–0.5)
EOSINOPHIL NFR BLD: 2.8 % (ref 0–8)
ERYTHROCYTE [DISTWIDTH] IN BLOOD BY AUTOMATED COUNT: 18.9 % (ref 11.5–14.5)
EST. GFR  (NO RACE VARIABLE): >60 ML/MIN/1.73 M^2
ETHANOL SERPL-MCNC: <10 MG/DL
GLUCOSE SERPL-MCNC: 107 MG/DL (ref 70–110)
GLUCOSE SERPL-MCNC: 115 MG/DL (ref 70–110)
GLUCOSE UR QL STRIP: NEGATIVE
HCT VFR BLD AUTO: 38.5 % (ref 40–54)
HCT VFR BLD CALC: 38 %PCV (ref 36–54)
HGB BLD-MCNC: 12 G/DL (ref 14–18)
HGB UR QL STRIP: NEGATIVE
IMM GRANULOCYTES # BLD AUTO: 0.07 K/UL (ref 0–0.04)
IMM GRANULOCYTES NFR BLD AUTO: 0.5 % (ref 0–0.5)
KETONES UR QL STRIP: ABNORMAL
LEUKOCYTE ESTERASE UR QL STRIP: NEGATIVE
LIPASE SERPL-CCNC: 142 U/L (ref 4–60)
LYMPHOCYTES # BLD AUTO: 1.9 K/UL (ref 1–4.8)
LYMPHOCYTES NFR BLD: 14.8 % (ref 18–48)
MCH RBC QN AUTO: 28.9 PG (ref 27–31)
MCHC RBC AUTO-ENTMCNC: 31.2 G/DL (ref 32–36)
MCV RBC AUTO: 93 FL (ref 82–98)
MONOCYTES # BLD AUTO: 1 K/UL (ref 0.3–1)
MONOCYTES NFR BLD: 7.3 % (ref 4–15)
NEUTROPHILS # BLD AUTO: 9.6 K/UL (ref 1.8–7.7)
NEUTROPHILS NFR BLD: 74.1 % (ref 38–73)
NITRITE UR QL STRIP: NEGATIVE
NRBC BLD-RTO: 0 /100 WBC
PH UR STRIP: 7 [PH] (ref 5–8)
PLATELET # BLD AUTO: 453 K/UL (ref 150–450)
PMV BLD AUTO: 10.3 FL (ref 9.2–12.9)
POC IONIZED CALCIUM: 1.03 MMOL/L (ref 1.06–1.42)
POC TCO2 (MEASURED): 23 MMOL/L (ref 23–29)
POTASSIUM BLD-SCNC: 4.8 MMOL/L (ref 3.5–5.1)
POTASSIUM SERPL-SCNC: 3.6 MMOL/L (ref 3.5–5.1)
PROT SERPL-MCNC: 8.1 G/DL (ref 6–8.4)
PROT UR QL STRIP: NEGATIVE
RBC # BLD AUTO: 4.15 M/UL (ref 4.6–6.2)
SAMPLE: ABNORMAL
SODIUM BLD-SCNC: 138 MMOL/L (ref 136–145)
SODIUM SERPL-SCNC: 138 MMOL/L (ref 136–145)
SP GR UR STRIP: >=1.03 (ref 1–1.03)
URN SPEC COLLECT METH UR: ABNORMAL
WBC # BLD AUTO: 13.01 K/UL (ref 3.9–12.7)

## 2023-10-12 PROCEDURE — 86361 T CELL ABSOLUTE COUNT: CPT

## 2023-10-12 PROCEDURE — 99223 1ST HOSP IP/OBS HIGH 75: CPT | Mod: ,,, | Performed by: HOSPITALIST

## 2023-10-12 PROCEDURE — 99223 PR INITIAL HOSPITAL CARE,LEVL III: ICD-10-PCS | Mod: ,,, | Performed by: HOSPITALIST

## 2023-10-12 PROCEDURE — 96375 TX/PRO/DX INJ NEW DRUG ADDON: CPT

## 2023-10-12 PROCEDURE — 25000003 PHARM REV CODE 250

## 2023-10-12 PROCEDURE — 93010 ELECTROCARDIOGRAM REPORT: CPT | Mod: ,,, | Performed by: INTERNAL MEDICINE

## 2023-10-12 PROCEDURE — 12000002 HC ACUTE/MED SURGE SEMI-PRIVATE ROOM

## 2023-10-12 PROCEDURE — 25500020 PHARM REV CODE 255: Performed by: EMERGENCY MEDICINE

## 2023-10-12 PROCEDURE — 96374 THER/PROPH/DIAG INJ IV PUSH: CPT

## 2023-10-12 PROCEDURE — 83690 ASSAY OF LIPASE: CPT

## 2023-10-12 PROCEDURE — 82077 ASSAY SPEC XCP UR&BREATH IA: CPT

## 2023-10-12 PROCEDURE — 85025 COMPLETE CBC W/AUTO DIFF WBC: CPT

## 2023-10-12 PROCEDURE — 96361 HYDRATE IV INFUSION ADD-ON: CPT

## 2023-10-12 PROCEDURE — 63600175 PHARM REV CODE 636 W HCPCS

## 2023-10-12 PROCEDURE — 93005 ELECTROCARDIOGRAM TRACING: CPT

## 2023-10-12 PROCEDURE — 80053 COMPREHEN METABOLIC PANEL: CPT

## 2023-10-12 PROCEDURE — G0378 HOSPITAL OBSERVATION PER HR: HCPCS

## 2023-10-12 PROCEDURE — 80307 DRUG TEST PRSMV CHEM ANLYZR: CPT

## 2023-10-12 PROCEDURE — 99285 EMERGENCY DEPT VISIT HI MDM: CPT | Mod: 25

## 2023-10-12 PROCEDURE — 81003 URINALYSIS AUTO W/O SCOPE: CPT

## 2023-10-12 PROCEDURE — 93010 EKG 12-LEAD: ICD-10-PCS | Mod: ,,, | Performed by: INTERNAL MEDICINE

## 2023-10-12 RX ORDER — IBUPROFEN 200 MG
16 TABLET ORAL
Status: DISCONTINUED | OUTPATIENT
Start: 2023-10-12 | End: 2023-10-19 | Stop reason: HOSPADM

## 2023-10-12 RX ORDER — PROCHLORPERAZINE EDISYLATE 5 MG/ML
5 INJECTION INTRAMUSCULAR; INTRAVENOUS EVERY 6 HOURS PRN
Status: DISCONTINUED | OUTPATIENT
Start: 2023-10-12 | End: 2023-10-19 | Stop reason: HOSPADM

## 2023-10-12 RX ORDER — ACETAMINOPHEN 325 MG/1
650 TABLET ORAL EVERY 4 HOURS PRN
Status: DISCONTINUED | OUTPATIENT
Start: 2023-10-12 | End: 2023-10-16

## 2023-10-12 RX ORDER — NALOXONE HCL 0.4 MG/ML
0.02 VIAL (ML) INJECTION
Status: DISCONTINUED | OUTPATIENT
Start: 2023-10-12 | End: 2023-10-19 | Stop reason: HOSPADM

## 2023-10-12 RX ORDER — ONDANSETRON 2 MG/ML
4 INJECTION INTRAMUSCULAR; INTRAVENOUS EVERY 8 HOURS PRN
Status: DISCONTINUED | OUTPATIENT
Start: 2023-10-12 | End: 2023-10-19 | Stop reason: HOSPADM

## 2023-10-12 RX ORDER — IBUPROFEN 200 MG
24 TABLET ORAL
Status: DISCONTINUED | OUTPATIENT
Start: 2023-10-12 | End: 2023-10-19 | Stop reason: HOSPADM

## 2023-10-12 RX ORDER — PANTOPRAZOLE SODIUM 40 MG/1
40 TABLET, DELAYED RELEASE ORAL 2 TIMES DAILY
Status: DISCONTINUED | OUTPATIENT
Start: 2023-10-12 | End: 2023-10-19 | Stop reason: HOSPADM

## 2023-10-12 RX ORDER — SODIUM CHLORIDE, SODIUM LACTATE, POTASSIUM CHLORIDE, CALCIUM CHLORIDE 600; 310; 30; 20 MG/100ML; MG/100ML; MG/100ML; MG/100ML
INJECTION, SOLUTION INTRAVENOUS CONTINUOUS
Status: DISCONTINUED | OUTPATIENT
Start: 2023-10-12 | End: 2023-10-12

## 2023-10-12 RX ORDER — SODIUM CHLORIDE 0.9 % (FLUSH) 0.9 %
10 SYRINGE (ML) INJECTION EVERY 12 HOURS PRN
Status: DISCONTINUED | OUTPATIENT
Start: 2023-10-12 | End: 2023-10-19 | Stop reason: HOSPADM

## 2023-10-12 RX ORDER — TALC
6 POWDER (GRAM) TOPICAL NIGHTLY PRN
Status: DISCONTINUED | OUTPATIENT
Start: 2023-10-12 | End: 2023-10-19 | Stop reason: HOSPADM

## 2023-10-12 RX ORDER — DROPERIDOL 2.5 MG/ML
1.25 INJECTION, SOLUTION INTRAMUSCULAR; INTRAVENOUS
Status: COMPLETED | OUTPATIENT
Start: 2023-10-12 | End: 2023-10-12

## 2023-10-12 RX ORDER — GLUCAGON 1 MG
1 KIT INJECTION
Status: DISCONTINUED | OUTPATIENT
Start: 2023-10-12 | End: 2023-10-19 | Stop reason: HOSPADM

## 2023-10-12 RX ORDER — SODIUM CHLORIDE, SODIUM LACTATE, POTASSIUM CHLORIDE, CALCIUM CHLORIDE 600; 310; 30; 20 MG/100ML; MG/100ML; MG/100ML; MG/100ML
INJECTION, SOLUTION INTRAVENOUS CONTINUOUS
Status: ACTIVE | OUTPATIENT
Start: 2023-10-12 | End: 2023-10-13

## 2023-10-12 RX ORDER — FOLIC ACID 1 MG/1
1 TABLET ORAL DAILY
Status: DISCONTINUED | OUTPATIENT
Start: 2023-10-13 | End: 2023-10-19 | Stop reason: HOSPADM

## 2023-10-12 RX ORDER — SODIUM CHLORIDE 0.9 % (FLUSH) 0.9 %
10 SYRINGE (ML) INJECTION
Status: DISCONTINUED | OUTPATIENT
Start: 2023-10-12 | End: 2023-10-19 | Stop reason: HOSPADM

## 2023-10-12 RX ORDER — HYDROMORPHONE HYDROCHLORIDE 1 MG/ML
2 INJECTION, SOLUTION INTRAMUSCULAR; INTRAVENOUS; SUBCUTANEOUS
Status: DISCONTINUED | OUTPATIENT
Start: 2023-10-12 | End: 2023-10-17

## 2023-10-12 RX ORDER — HYDROMORPHONE HYDROCHLORIDE 2 MG/1
2 TABLET ORAL
Status: DISCONTINUED | OUTPATIENT
Start: 2023-10-12 | End: 2023-10-12

## 2023-10-12 RX ORDER — HYDROMORPHONE HYDROCHLORIDE 1 MG/ML
1 INJECTION, SOLUTION INTRAMUSCULAR; INTRAVENOUS; SUBCUTANEOUS
Status: COMPLETED | OUTPATIENT
Start: 2023-10-12 | End: 2023-10-12

## 2023-10-12 RX ORDER — PROMETHAZINE HYDROCHLORIDE 25 MG/1
25 TABLET ORAL EVERY 4 HOURS PRN
Status: ON HOLD | COMMUNITY
End: 2023-12-21 | Stop reason: CLARIF

## 2023-10-12 RX ADMIN — IOHEXOL 75 ML: 350 INJECTION, SOLUTION INTRAVENOUS at 11:10

## 2023-10-12 RX ADMIN — CEFEPIME 1 G: 1 INJECTION, POWDER, FOR SOLUTION INTRAMUSCULAR; INTRAVENOUS at 01:10

## 2023-10-12 RX ADMIN — HYDROMORPHONE HYDROCHLORIDE 1 MG: 1 INJECTION, SOLUTION INTRAMUSCULAR; INTRAVENOUS; SUBCUTANEOUS at 11:10

## 2023-10-12 RX ADMIN — PANTOPRAZOLE SODIUM 40 MG: 40 TABLET, DELAYED RELEASE ORAL at 09:10

## 2023-10-12 RX ADMIN — HYDROMORPHONE HYDROCHLORIDE 2 MG: 1 INJECTION, SOLUTION INTRAMUSCULAR; INTRAVENOUS; SUBCUTANEOUS at 05:10

## 2023-10-12 RX ADMIN — HYDROMORPHONE HYDROCHLORIDE 2 MG: 1 INJECTION, SOLUTION INTRAMUSCULAR; INTRAVENOUS; SUBCUTANEOUS at 12:10

## 2023-10-12 RX ADMIN — CEFEPIME 1 G: 1 INJECTION, POWDER, FOR SOLUTION INTRAMUSCULAR; INTRAVENOUS at 04:10

## 2023-10-12 RX ADMIN — SODIUM CHLORIDE, POTASSIUM CHLORIDE, SODIUM LACTATE AND CALCIUM CHLORIDE: 600; 310; 30; 20 INJECTION, SOLUTION INTRAVENOUS at 05:10

## 2023-10-12 RX ADMIN — HYDROMORPHONE HYDROCHLORIDE 2 MG: 1 INJECTION, SOLUTION INTRAMUSCULAR; INTRAVENOUS; SUBCUTANEOUS at 09:10

## 2023-10-12 RX ADMIN — HYDROMORPHONE HYDROCHLORIDE 2 MG: 1 INJECTION, SOLUTION INTRAMUSCULAR; INTRAVENOUS; SUBCUTANEOUS at 03:10

## 2023-10-12 RX ADMIN — SODIUM CHLORIDE, POTASSIUM CHLORIDE, SODIUM LACTATE AND CALCIUM CHLORIDE 1000 ML: 600; 310; 30; 20 INJECTION, SOLUTION INTRAVENOUS at 11:10

## 2023-10-12 RX ADMIN — DROPERIDOL 1.25 MG: 2.5 INJECTION, SOLUTION INTRAMUSCULAR; INTRAVENOUS at 11:10

## 2023-10-12 NOTE — ED TRIAGE NOTES
The patient has a recent history of pancreatitis. The patient is currently experiencing abdominal pain with a pain scale of 10/10, SOB, and continuous nausea and vomiting.

## 2023-10-12 NOTE — Clinical Note
Diagnosis: Abdominal pain [818526]   Future Attending Provider: NESHA BYNUM [[id number]]   Admitting Provider:: NESHA BYNUM [[id number]]   Special Needs:: No Special Needs [1]

## 2023-10-12 NOTE — PHARMACY MED REC
"Admission Medication History     The home medication history was taken by Virgilio Tolentino.    You may go to "Admission" then "Reconcile Home Medications" tabs to review and/or act upon these items.     The home medication list has been updated by the Pharmacy department.   Please read ALL comments highlighted in yellow.   Please address this information as you see fit.    Feel free to contact us if you have any questions or require assistance.      The medications listed below were removed from the home medication list. Please reorder if appropriate:  Patient reports no longer taking the following medication(s):  CEFEPIME 2 GM/ D5W 100 ML  FOLIC ACID 1 MG TABLET  PANTOPRAZOLE 40 MG TABLET      Current Outpatient Medications on File Prior to Encounter   Medication Sig    jrprhwedc-tttqzcty-smjuoxs ala (BIKTARVY) -25 mg (25 kg or greater)   Take 1 tablet by mouth once daily.    HYDROmorphone (DILAUDID) 2 MG tablet   Take 1 tablet (2 mg total) by mouth every 3 (three) hours as needed for Pain.    promethazine (PHENERGAN) 25 MG tablet   Take 25 mg by mouth every 4 (four) hours as needed for Nausea.       Potential issues to be addressed PRIOR TO DISCHARGE  Patient requires education regarding drug therapies     Virgilio Tolentino  [id number]                  .          "

## 2023-10-12 NOTE — ASSESSMENT & PLAN NOTE
Per ED chart review, patient with blood-tinged emesis. Patient denies recent vomiting since discharge on 10/10. EGD from Sep 2023 notable for gastric erythema and small MW tear.     - Continue PPI

## 2023-10-12 NOTE — ASSESSMENT & PLAN NOTE
History of PV requiring therapeutic phlebotomy. Blood counts within appropriate range at this time.  - Continue to monitor daily CBC

## 2023-10-12 NOTE — SUBJECTIVE & OBJECTIVE
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     Current Outpatient Medications on File Prior to Encounter   Medication Sig    zngxetgtx-tqsrgxbx-ralbyyk ala (BIKTARVY) -25 mg (25 kg or greater) Take 1 tablet by mouth once daily.    dextrose 5 % in water (D5W) PgBk 100 mL with ceFEPIme 2 gram SolR 2 g Inject 2 g into the vein every 12 (twelve) hours.    folic acid (FOLVITE) 1 MG tablet Take 1 tablet (1 mg total) by mouth once daily.    HYDROmorphone (DILAUDID) 2 MG tablet Take 1 tablet (2 mg total) by mouth every 3 (three) hours as needed for Pain.    pantoprazole (PROTONIX) 40 MG tablet Take 1 tablet (40 mg total) by mouth 2 (two) times daily.     Family History       Problem Relation (Age of Onset)    Breast cancer Maternal Grandmother    Cancer Mother, Brother    No Known Problems Father    Ovarian cancer Mother    Pancreatitis Mother          Tobacco Use    Smoking status: Some Days     Types: Cigarettes    Smokeless tobacco: Never   Substance and Sexual Activity    Alcohol use: Yes    Drug use: Never    Sexual activity: Not on file     Review of Systems   Constitutional:   Positive for chills. Negative for diaphoresis, fatigue and fever.   Respiratory:  Positive for shortness of breath. Negative for cough.    Cardiovascular:  Negative for chest pain, palpitations and leg swelling.   Gastrointestinal:  Positive for abdominal pain, diarrhea, nausea and vomiting. Negative for abdominal distention, blood in stool and constipation.   Musculoskeletal:  Negative for arthralgias and myalgias.   Neurological:  Negative for dizziness.   All other systems reviewed and are negative.    Objective:     Vital Signs (Most Recent):  Temp: 98.1 °F (36.7 °C) (10/12/23 1001)  Pulse: 105 (10/12/23 1400)  Resp: 18 (10/12/23 1224)  BP: 121/78 (10/12/23 1400)  SpO2: 97 % (10/12/23 1400) Vital Signs (24h Range):  Temp:  [98.1 °F (36.7 °C)] 98.1 °F (36.7 °C)  Pulse:  [] 105  Resp:  [18-22] 18  SpO2:  [95 %-98 %] 97 %  BP: (119-147)/(75-91) 121/78     Weight: 70.8 kg (156 lb)  Body mass index is 22.38 kg/m².     Physical Exam  Vitals and nursing note reviewed.   Constitutional:       General: He is not in acute distress.     Appearance: Normal appearance.   HENT:      Head: Normocephalic and atraumatic.   Eyes:      Extraocular Movements: Extraocular movements intact.      Pupils: Pupils are equal, round, and reactive to light.   Cardiovascular:      Rate and Rhythm: Normal rate and regular rhythm.      Pulses: Normal pulses.   Pulmonary:      Effort: Pulmonary effort is normal. No respiratory distress.   Abdominal:      General: Abdomen is flat. Bowel sounds are normal. There is no distension.      Palpations: Abdomen is soft.      Tenderness: There is abdominal tenderness (diffuse to light palpation). There is no rebound. Guarding: voluntary.  Musculoskeletal:         General: No swelling or tenderness. Normal range of motion.   Skin:     General: Skin is warm and dry.      Capillary Refill: Capillary refill takes less than 2 seconds.      Coloration: Skin is not jaundiced.   Neurological:       General: No focal deficit present.      Mental Status: He is alert and oriented to person, place, and time. Mental status is at baseline.      Cranial Nerves: No cranial nerve deficit.      Motor: No weakness.              CRANIAL NERVES     CN III, IV, VI   Pupils are equal, round, and reactive to light.       Significant Labs: All pertinent labs within the past 24 hours have been reviewed.    Significant Imaging: I have reviewed all pertinent imaging results/findings within the past 24 hours.

## 2023-10-12 NOTE — ED NOTES
I-STAT Chem-8+ Results:   Value Reference Range   Sodium 138 136-145 mmol/L   Potassium  4.8 3.5-5.1 mmol/L   Chloride 107  mmol/L   Ionized Calcium 1.03 1.06-1.42 mmol/L   CO2 (measured) 23 23-29 mmol/L   Glucose 115  mg/dL   BUN 4 6-30 mg/dL   Creatinine 0.5 0.5-1.4 mg/dL   Hematocrit 38 36-54%

## 2023-10-12 NOTE — ASSESSMENT & PLAN NOTE
23M PMH HIV (on Biktarvy, last CD4 296 from 9/20/23), EtOH use, acute necrotizing pancreatitis, splenic vein thrombosis, asthma, and anemia who presents to ED with abdominal pain, nausea, and vomiting. Recent discharge on 10/10 after admission for acute necrotizing pancreatitis. Interval increase WBC and lipase. Imaging completed on this admission without interval changes to collection size or air suggesting abscess. AES consulted on previous admission, who deferred against tapping collection due to immaturity. ID consulted for antibiotic coverage on previous admission, and patient was discharged with cefepime after midline placement.    - Continue Cefepime  - Consult AES  - CLD  - Pain control, nausea meds

## 2023-10-12 NOTE — ASSESSMENT & PLAN NOTE
"This patient in known to have HIV+ status (Have detected HIV PCR but never CD4 <200 or AIDS defining illness). Labs reviewed- No results found for: "CD4", No results found for: "HIVDNAPCR". Patient is on HAART. Will continue. Continue to monitor routine labs. Last CD4 count was   Lab Results   Component Value Date    ABSOLUTECD4 296 (L) 09/20/2023       - Continue home Biktarvy  - F/u repeat CD4  - We will not consult Infectious disease at this time. Other HIV-associated diseases are not present.    "

## 2023-10-12 NOTE — ASSESSMENT & PLAN NOTE
Heme/onc consulted on previous admission after diagnosis of partially occlusive splenic vein thrombosis. Anticoagulation was deferred at that time, pending repeat imaging. On CT this admission, there is continued apparent thrombosis.  - Will continue to monitor  - Consider repeat US and possible initiation of AC

## 2023-10-12 NOTE — MEDICAL/APP STUDENT
[facility] Medicine Student   History and Physical  Networked reference to record PCT   10/12/2023  2:47 PM    SUBJECTIVE:     Chief Complaint:  Abdominal pain, nausea and vomiting    History of Present Illness:  Tasia Cardona is a 23 y.o. male with a relevant medical history of HIV infection, ETOH abuse, acute necrotizing pancreatitis, splenic vein thrombosis asthma and anemia who presents with abdominal pain, nausea and vomiting.    Pt presented to [facility] secondary to abdominal pain and N/V. Abdominal pain has been present for 1 month however worsened in severity this morning. Pt was diagnosed with acute necrotizing pancreatitis, brandy hitchcock tear, and splenic vein thrombosis on 10/05/23 at [facility]. He was d/c with cefpodoxime 400 mg po bid to complete 4 week course and dilaudid 2 mg po PRN. Pt reports compliance with antibiotics, and has not required more pain medication his last dose was yesterday evening. Last alcoholic beverage was several months ago.    In the ED vitals were stable with slight tachycardia, labs reveal a WBC 13.01 (increased from 6.08 on 10/08), Lipase 142 (increased from 76 on 10/05/23). CT of the abdomen and pelvis w/ contrast revealed grossly stable lobulated peripancreatic fluid collection as compared to examination 10/05/2023. There is mild inflammation about the pancreatic tail, somewhat decreased since the previous examination. Chest X-ray revealed a small plural effusion of the left thoracic cavity.    Pt reports abdominal pain with radiation to the scapula, chest tightness, nausea and vomiting with small amounts of blood in vomit. Pt denies diarrhea/constipation, confusion, weakness or other sensory changes. Pt reports to being compliant with HIV medications and has not experienced recent infection. Pt is being admitted secondary to intractable emesis and episodes of hematemesis.      Review of Systems   Constitutional:  Positive for malaise/fatigue. Negative for chills and fever.   HENT:   Negative for congestion and sore throat.    Eyes:  Negative for blurred vision.   Respiratory:  Positive for cough and shortness of breath. Negative for hemoptysis and sputum production.    Cardiovascular:  Negative for chest pain and palpitations.   Gastrointestinal:  Positive for abdominal pain, heartburn, nausea and vomiting. Negative for blood in stool, constipation and diarrhea.   Musculoskeletal:  Positive for back pain. Negative for myalgias.   Skin:  Negative for rash.   Neurological:  Negative for dizziness, tingling, sensory change, focal weakness and headaches.   Endo/Heme/Allergies:  Does not bruise/bleed easily.   Psychiatric/Behavioral:  Negative for depression. The patient is nervous/anxious.      HISTORY:     Past Medical History:   Diagnosis Date    Acute necrotizing pancreatitis 09/20/2023    Alcohol use disorder 10/05/2023    Asthma     Hepatitis C antibody positive in blood 09/18/2023 9/2023 PCR negative    HIV infection 10/2021    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/2023    Normocytic anemia 09/18/2023    Polycythemia vera 11/28/2021    Receives phlebotomy if Hct>45    Positive CMV IgG serology 09/21/2023    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       Past Surgical History:   Procedure Laterality Date    ESOPHAGOGASTRODUODENOSCOPY N/A 9/18/2023    Procedure: EGD (ESOPHAGOGASTRODUODENOSCOPY);  Surgeon: Kg Cleaning MD;  Location: [address];  Service: Endoscopy;  Laterality: N/A;       Family History   Problem Relation Age of Onset    Pancreatitis Mother     Cancer Mother     Ovarian cancer Mother     No Known Problems Father     Cancer Brother     Breast cancer Maternal Grandmother        Social History     Socioeconomic History    Marital status: Single   Tobacco Use    Smoking status: Some Days     Types: Cigarettes    Smokeless tobacco: Never   Substance and Sexual Activity    Alcohol use: Yes    Drug use: Never     Social Determinants of Health     Financial Resource Strain: Low Risk   (9/18/2023)    Overall Financial Resource Strain (CARDIA)     Difficulty of Paying Living Expenses: Not hard at all   Food Insecurity: No Food Insecurity (9/18/2023)    Hunger Vital Sign     Worried About Running Out of Food in the Last Year: Never true     Ran Out of Food in the Last Year: Never true   Transportation Needs: No Transportation Needs (9/18/2023)    PRAPARE - Transportation     Lack of Transportation (Medical): No     Lack of Transportation (Non-Medical): No   Physical Activity: Inactive (9/18/2023)    Exercise Vital Sign     Days of Exercise per Week: 0 days     Minutes of Exercise per Session: 0 min   Stress: Stress Concern Present (9/18/2023)    [employer] [place] [employer] - Occupational Stress Questionnaire     Feeling of Stress : To some extent   Social Connections: Socially Isolated (9/18/2023)    Social Connection and Isolation Panel [NHANES]     Frequency of Communication with Friends and Family: More than three times a week     Frequency of Social Gatherings with Friends and Family: Once a week     Attends [employer]al Services: Never     Active Member of Clubs or Organizations: No     Attends Club or Organization Meetings: Never     Marital Status: Never    Housing Stability: Low Risk  (9/18/2023)    Housing Stability Vital Sign     Unable to Pay for Housing in the Last Year: No     Number of Places Lived in the Last Year: 1     Unstable Housing in the Last Year: No     Social Hx: Reports alcohol abuse in past but denies drinks in several months      MEDICATIONS & ALLERGIES:     No current facility-administered medications on file prior to encounter.     Current Outpatient Medications on File Prior to Encounter   Medication Sig Dispense Refill    rdytwkriw-vcrpkpao-hxsqucj ala (BIKTARVY) -25 mg (25 kg or greater) Take 1 tablet by mouth once daily.      dextrose 5 % in water (D5W) PgBk 100 mL with ceFEPIme 2 gram SolR 2 g Inject 2 g into the vein every 12 (twelve) hours.       folic acid (FOLVITE) 1 MG tablet Take 1 tablet (1 mg total) by mouth once daily. 90 tablet 0    HYDROmorphone (DILAUDID) 2 MG tablet Take 1 tablet (2 mg total) by mouth every 3 (three) hours as needed for Pain. 30 tablet 0    pantoprazole (PROTONIX) 40 MG tablet Take 1 tablet (40 mg total) by mouth 2 (two) times daily. 180 tablet 0     Pt Reports adherence to medication regimen    Review of patient's allergies indicates:   Allergen Reactions    [place] Swelling    Pcn [penicillins] Hives     Tolerates cephalosporins    Pecan nut Swelling    Soy     Sulfa (sulfonamide antibiotics) Hives       OBJECTIVE:     Vital Signs Recent:  Temp: 98.1 °F (36.7 °C) (10/12/23 1001)  Pulse: 105 (10/12/23 1400)  Resp: 18 (10/12/23 1224)  BP: 121/78 (10/12/23 1400)  SpO2: 97 % (10/12/23 1400)  Oxygen Documentation:                          Vital Signs Range (Last 24H):  Temp:  [98.1 °F (36.7 °C)]   Pulse:  []   Resp:  [18-22]   BP: (119-147)/(75-91)   SpO2:  [95 %-98 %]        Weight:  Body mass index is 22.38 kg/m².  Wt Readings from Last 3 Encounters:   10/12/23 70.8 kg (156 lb)   10/05/23 72.1 kg (158 lb 15.2 oz)   09/18/23 75.3 kg (166 lb)        Physical Exam  Constitutional:       General: He is not in acute distress.     Appearance: Normal appearance. He is ill-appearing.   HENT:      Head: Normocephalic and atraumatic.      Nose: Nose normal. No congestion.      Mouth/Throat:      Mouth: Mucous membranes are moist.      Pharynx: Oropharynx is clear. No posterior oropharyngeal erythema.   Eyes:      Pupils: Pupils are equal, round, and reactive to light.   Cardiovascular:      Rate and Rhythm: Regular rhythm. Tachycardia present.      Pulses: Normal pulses.      Heart sounds: No murmur heard.     No friction rub. No gallop.   Pulmonary:      Effort: No respiratory distress.      Breath sounds: Normal breath sounds. No stridor. No wheezing.   Abdominal:      General: Abdomen is flat.      Palpations: Abdomen is soft.       Tenderness: There is abdominal tenderness. There is guarding.   Musculoskeletal:         General: No swelling, tenderness, deformity or signs of injury.   Skin:     General: Skin is warm and dry.      Coloration: Skin is not jaundiced.   Neurological:      General: No focal deficit present.      Mental Status: He is alert and oriented to person, place, and time.   Psychiatric:         Mood and Affect: Mood normal.         Thought Content: Thought content normal.         Sodium (mmol/L)   Date Value   10/12/2023 138   10/08/2023 138   10/07/2023 136     Potassium (mmol/L)   Date Value   10/12/2023 3.6   10/08/2023 3.6   10/07/2023 3.8     Chloride (mmol/L)   Date Value   10/12/2023 105   10/08/2023 101   10/07/2023 101     CO2 (mmol/L)   Date Value   10/12/2023 20 (L)   10/08/2023 25   10/07/2023 24     BUN (mg/dL)   Date Value   10/12/2023 5 (L)   10/08/2023 3 (L)   10/07/2023 3 (L)     Creatinine (mg/dL)   Date Value   10/12/2023 0.7   10/08/2023 0.6   10/07/2023 0.6     Glucose (mg/dL)   Date Value   10/12/2023 107   10/08/2023 85   10/07/2023 82     Calcium (mg/dL)   Date Value   10/12/2023 9.5   10/08/2023 8.7   10/07/2023 8.8     Magnesium (mg/dL)   Date Value   10/08/2023 1.8   10/07/2023 1.8   10/06/2023 1.7     Phosphorus (mg/dL)   Date Value   10/08/2023 5.1 (H)   10/07/2023 3.6   10/06/2023 4.1     Alkaline Phosphatase (U/L)   Date Value   10/12/2023 81   10/08/2023 65   10/07/2023 67     ALT (U/L)   Date Value   10/12/2023 26   10/08/2023 8 (L)   10/07/2023 6 (L)     AST (U/L)   Date Value   10/12/2023 42 (H)   10/08/2023 18   10/07/2023 16     Albumin (g/dL)   Date Value   10/12/2023 3.5   10/08/2023 2.6 (L)   10/07/2023 2.6 (L)     Total Protein (g/dL)   Date Value   10/12/2023 8.1   10/08/2023 6.3   10/07/2023 6.4     Total Bilirubin (mg/dL)   Date Value   10/12/2023 0.3   10/08/2023 0.3   10/07/2023 0.4     INR (no units)   Date Value   10/06/2023 1.1   09/17/2023 1.1       WBC (K/uL)   Date  Value   10/12/2023 13.01 (H)   10/08/2023 6.08   10/07/2023 6.27     Hemoglobin (g/dL)   Date Value   10/12/2023 12.0 (L)   10/08/2023 9.7 (L)   10/07/2023 9.5 (L)     Platelets (K/uL)   Date Value   10/12/2023 453 (H)   10/08/2023 391   10/07/2023 417       Diagnostic Results:  CT ABDOMEN PELVIS WITH CONTRAST  Impression:  1. Grossly stable lobulated peripancreatic fluid collection as compared to examination 10/05/2023.  There is mild inflammation about the pancreatic tail, somewhat decreased since the previous examination.  Continued follow-up is advised.  2. Thrombus within the splenic vein noting perigastric collaterals, the splenic vein remains patent.  3. Small left pleural effusion, partially imaged.  4. Findings suggesting hepatic steatosis, correlation with LFTs recommended.    XR CHEST 1 VIEW  Impression:  Small left pleural effusion    ASSESSMENT -- PLAN:   Tasia Cardona is a 23 y.o. male with a relevant medical history of HIV infection, ETOH abuse, acute necrotizing pancreatitis, splenic vein thrombosis asthma and anemia who is being treated for intractable emesis and episodes of hematemesis      Active Hospital Problems    Diagnosis  POA    SIRS (systemic inflammatory response syndrome) [R65.10]  Yes    Mild intermittent asthma without complication [J45.20]  Yes     Chronic      Resolved Hospital Problems   No resolved problems to display.      1.SIRS  ASSESSMENT:  In the ED vitals revealed tachycardia of 105, labs reveal a WBC 13.01 (increased from 6.08 on 10/08), Lipase 142 (increased from 76 on 10/05/23). Possibly exacerbation of chronic pancreatitis is suspected due to the increase in lipase and epigastric pain  PLAN:  -IV NS fluids   -Pain control  -Consult GI  -Clear liquid diet  -Cont pantoprazole table 40 mg BID    2. HIV  ASSESSMENT: pt has a previous diagnosis of HIV which is currently being treated. Last CD4 count was 296 on 09/20/23   PLAN:  -cont wfeofrpes-uqmrolpj-vfdszto ala -25 mg  (25 kg or greater) 1 tablet daily    3. Mild intermittent asthma without complication  ASSESSMENT: pt has chronic asthma  PLAN:  -cont home regimen PRN  -observe for exacerbation     Discharge planning:   Estimated Date of Discharge: Unknown  Code Status: Full Code  Discharge is contingent on patient. Patients VS must be stable, pt must remain afebrile for 24 hrs, pt must be adequately ambulatory     GAVIN Ferraro IV, Medical student

## 2023-10-12 NOTE — HPI
23M PMH HIV (on Biktar[employer], last CD4 296 from 9/20/23), EtOH use, acute necrotizing pancreatitis, splenic vein thrombosis, asthma, and anemia who presents to ED with abdominal pain, nausea, and vomiting. He was recently discharged on 10/10 after admission for acute necrotizing pancreatitis. His abdominal pain never fully resolved following discharge, and he was sent home with Dilaudid PO 2 mg PRN. Patient woke up early this morning with severe, generalized abdominal pain with radiation to scapula, nausea, and blood-tinged emesis after arriving to the ED.He denies any recent EtOH use since discharge.     He was evaluated by [facility] on previous admission, who recommended against any intervention on the pancreatic collection due to immaturity. He was treated with cefepime (as recommended by ID), which was continued after discharge via midline and patient reports compliance. Recent endoscopy (9/18/23) notable for a small Corinne-Chauhan tear and gastric erythema.     In ED, /91, tachycardic (128), and afebrile. CBC notable for slight leukocytosis (13.01 - increased since discharge) and CMP unremarkable. EKG w/ sinus tachycardia. CXR remarkable for small L pleural effusion. CT A/P similar to prior with lobulated peripancreatic fluid collection and thrombus within splenic vein but patent.

## 2023-10-12 NOTE — ED PROVIDER NOTES
Encounter Date: 10/12/2023       History     Chief Complaint   Patient presents with    Abdominal Pain    Emesis     Actively vomiting in triage/PICC line right upper arm- states receiving ABT for pancreatitis/abd abscess/ HIV+     28-year-old male with a history of HIV infection, ETOH abuse, A.N.P., splenic vein thrombosis asthma and anemia presents with a chief complaint of abdominal pain, nausea and vomiting.  The patient says that the abdominal pain has been present for about a month.  He said he was recently diagnosed with necrotizing pancreatitis with abscess.  He says that he was discharged on antibiotics and he has been compliant with those.  He says that he is also been taking Dilaudid for his abdominal pain, his last dose was last evening, he says he was not able to take any today because of his vomiting.  He denies any recent alcohol ingestion.  He says that he thinks he may have vomited a little bit of blood.  He also says that he has chest pain radiating to his left scapula.  He is denying any fevers, cough, fainting, diarrhea or new rashes.    The history is provided by the patient. No  was used.     Review of patient's allergies indicates:   Allergen Reactions    [place] Swelling    Pcn [penicillins] Hives     Tolerates cephalosporins    Pecan nut Swelling    Soy     Sulfa (sulfonamide antibiotics) Hives     Past Medical History:   Diagnosis Date    Acute necrotizing pancreatitis 09/20/2023    Alcohol use disorder 10/05/2023    Asthma     Hepatitis C antibody positive in blood 09/18/2023 9/2023 PCR negative    HIV infection 10/2021    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/2023    Normocytic anemia 09/18/2023    Polycythemia vera 11/28/2021    Receives phlebotomy if Hct>45    Positive CMV IgG serology 09/21/2023    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     Past Surgical History:   Procedure Laterality Date    ESOPHAGOGASTRODUODENOSCOPY N/A 9/18/2023    Procedure: EGD (ESOPHAGOGASTRODUODENOSCOPY);   Surgeon: Kg Cleaning MD;  Location: [facility] ([address] [place] [address]);  Service: Endoscopy;  Laterality: N/A;     Family History   Problem Relation Age of Onset    Pancreatitis Mother     Cancer Mother     Ovarian cancer Mother     No Known Problems Father     Cancer Brother     Breast cancer Maternal Grandmother      Social History     Tobacco Use    Smoking status: Some Days     Types: Cigarettes    Smokeless tobacco: Never   Substance Use Topics    Alcohol use: Yes    Drug use: Never     Review of Systems    Physical Exam     Initial Vitals [10/12/23 1001]   BP Pulse Resp Temp SpO2   (!) 131/91 (!) 128 (!) 22 98.1 °F (36.7 °C) 97 %      MAP       --         Physical Exam    Nursing note and vitals reviewed.  Constitutional: He appears well-developed and well-nourished. He is not diaphoretic. No distress.   HENT:   Head: Normocephalic and atraumatic.   Eyes: EOM are normal. Pupils are equal, round, and reactive to light. No scleral icterus.   Neck: Neck supple.   Cardiovascular:  Regular rhythm, normal heart sounds and intact distal pulses.           Patient is tachycardic   Pulmonary/Chest: Breath sounds normal. He has no wheezes. He has no rhonchi. He has no rales.   Abdominal: Abdomen is soft. He exhibits no distension. There is abdominal tenderness.   Abdomen is diffusely tender, most prominently in the epigastric region There is guarding. There is no rebound.   Musculoskeletal:         General: No tenderness or edema. Normal range of motion.      Cervical back: Neck supple.     Neurological: He is alert and oriented to person, place, and time. He has normal strength.   Skin: Skin is warm and dry. Capillary refill takes less than 2 seconds. No rash noted. No erythema. No pallor.   Psychiatric: He has a normal mood and affect. His behavior is normal. Judgment and thought content normal.         ED Course   Procedures  Labs Reviewed   CBC W/ AUTO DIFFERENTIAL - Abnormal; Notable for the following components:       Result  Value    WBC 13.01 (*)     RBC 4.15 (*)     Hemoglobin 12.0 (*)     Hematocrit 38.5 (*)     MCHC 31.2 (*)     RDW 18.9 (*)     Platelets 453 (*)     Gran # (ANC) 9.6 (*)     Immature Grans (Abs) 0.07 (*)     Gran % 74.1 (*)     Lymph % 14.8 (*)     All other components within normal limits   COMPREHENSIVE METABOLIC PANEL - Abnormal; Notable for the following components:    CO2 20 (*)     BUN 5 (*)     AST 42 (*)     All other components within normal limits   LIPASE - Abnormal; Notable for the following components:    Lipase 142 (*)     All other components within normal limits   ISTAT PROCEDURE - Abnormal; Notable for the following components:    POC Glucose 115 (*)     POC BUN 4 (*)     POC Ionized Calcium 1.03 (*)     All other components within normal limits   URINALYSIS, REFLEX TO URINE CULTURE   DRUGS OF ABUSE SCREEN, BLOOD   ALCOHOL,MEDICAL (ETHANOL)   T-HELPER CELLS (CD4) COUNT   ISTAT CHEM8        ECG Results              EKG 12-lead (Final result)  Result time 10/12/23 12:23:11      Final result by Interface, Lab In [facility] (10/12/23 12:23:11)                   Narrative:    Test Reason : R00.0,    Vent. Rate : 110 BPM     Atrial Rate : 110 BPM     P-R Int : 122 ms          QRS Dur : 078 ms      QT Int : 334 ms       P-R-T Axes : 018 010 043 degrees     QTc Int : 452 ms    Sinus tachycardia  Otherwise normal ECG  When compared with ECG of 05-OCT-2023 14:10,  Questionable change in The axis  T wave inversion no longer evident in Lateral leads  Confirmed by SANJUANITA LEVY MD ([id number]) on 10/12/2023 12:23:05 PM    Referred By: [email or web address]   SELF           Confirmed By:SANJUANITA LEVY MD                                  Imaging Results              X-Ray Chest 1 View (Final result)  Result time 10/12/23 13:10:15   Procedure changed from X-Ray Abdomen Flat And Erect     Final result by Francesco Mei MD (10/12/23 13:10:15)                   Impression:      Small left pleural effusion.      Electronically signed  "by: Francesco Josephshaquille  Date:    10/12/2023  Time:    13:10               Narrative:    EXAMINATION:  XR CHEST 1 VIEW    CLINICAL HISTORY:  abdominal pain; Unspecified abdominal pain    TECHNIQUE:  Single frontal view of the chest was performed.    Note that this chest radiograph is stored on PACs in a folder labeled "XR ABDOMEN FLAT AND ERECT".    COMPARISON:  Portable AP chest x-ray 09/17/2023    FINDINGS:  Midline thoracic trachea.    Suboptimal inspiratory result.    Very minimal new blunting of the left lateral costophrenic angle, suspicious for a small left pleural effusion.    Otherwise, the cardiopericardial silhouette, lungs, pleura, and visualized portions of the skeleton and upper abdomen demonstrate no acute radiographic abnormalities.    Faintly visualized right upper pole pyelogram, likely related to a recent dose of IV contrast.                                        CT Abdomen Pelvis With Contrast (Final result)  Result time 10/12/23 12:44:00      Final result by Juan Sexton MD (10/12/23 12:44:00)                   Impression:      This report was flagged in Epic as abnormal.    1. Grossly stable lobulated peripancreatic fluid collection as compared to examination 10/05/2023.  There is mild inflammation about the pancreatic tail, somewhat decreased since the previous examination.  Continued follow-up is advised.  2. Thrombus within the splenic vein noting perigastric collaterals, the splenic vein remains patent.  3. Small left pleural effusion, partially imaged.  4. Findings suggesting hepatic steatosis, correlation with LFTs recommended.  5. Please see above for several additional findings.      Electronically signed by: Juan Sexton MD  Date:    10/12/2023  Time:    12:44               Narrative:    EXAMINATION:  CT ABDOMEN PELVIS WITH CONTRAST    CLINICAL HISTORY:  Abdominal abscess/infection suspected;    TECHNIQUE:  Low dose axial images, sagittal and coronal reformations were obtained " from the lung bases to the pubic symphysis following the IV administration of 75 mL of Omnipaque 350 .  Oral contrast was not given.    COMPARISON:  10/05/2023    FINDINGS:  images of the lower thorax are remarkable for a partially imaged mild right pleural effusion with associated compressive atelectasis of the right lower lobe.    The liver is hypoattenuating, may reflect steatosis, correlation with LFTs recommended. The liver is enlarged. The spleen is prominent. The adrenal glands and gallbladder are unremarkable. The pancreas enhances relatively homogeneously noting there may be slight hypoenhancement at the level of the pancreatic tail. There is a lobular apparently interconnected fluid collection along the anterior aspect of the pancreas extending to the pancreatic tail, grossly similar in size and configuration as compared to the previous exam. Largest single component of fluid measures 5.7 x 6.8 cm. No convincing air within the collection. There is edema about the distal aspect of the collection at the level of the pancreatic tail. The pancreatic duct is not dilated. No biliary dilation. There is strand-like fluid in the abdomen. There are several prominent abdominal lymph nodes, likely reactive change.  The stomach is decompressed without wall thickening. The portal vein, SMV, celiac axis and SMA all are patent.  There is apparent thrombus within the distal aspect of the splenic vein noting multiple perigastric collateral vessels.    The kidneys enhance symmetrically without hydronephrosis or nephrolithiasis. The bilateral ureters are unremarkable without calculi seen. The urinary bladder is nondistended noting there may be residual wall thickening. The prostate is not enlarged. There is a small amount of fluid in the pelvis.    There are a few scattered proximal colonic diverticula without inflammation. The large bowel is for the most part decompressed. Terminal ileum and appendix are unremarkable. The  small bowel is grossly unremarkable. There are a few scattered shotty periaortic and paracaval lymph nodes. No focal organized pelvic fluid collection.    No acute osseous abnormality. There is bilateral gynecomastia.                                       Medications   HYDROmorphone injection 2 mg (2 mg Intravenous Given 10/12/23 1509)   sodium chloride 0.9% flush 10 mL (has no administration in time range)   melatonin tablet 6 mg (has no administration in time range)   rxfvkrwxq-yhqkfyvf-tnwmvxk ala -25 mg (25 kg or greater) 1 tablet (has no administration in time range)   folic acid tablet 1 mg (has no administration in time range)   pantoprazole EC tablet 40 mg (has no administration in time range)   ceFEPIme (MAXIPIME) 1 g in dextrose 5 % in water (D5W) 100 mL IVPB (MB+) (has no administration in time range)   ceFEPIme (MAXIPIME) 2 g in dextrose 5 % in water (D5W) 100 mL IVPB (MB+) (has no administration in time range)   lactated ringers infusion (has no administration in time range)   lactated ringers bolus 1,000 mL (0 mLs Intravenous Stopped 10/12/23 1225)   HYDROmorphone injection 1 mg (1 mg Intravenous Given 10/12/23 1124)   droPERidol injection 1.25 mg (1.25 mg Intravenous Given 10/12/23 1124)   iohexoL (OMNIPAQUE 350) injection 75 mL (75 mLs Intravenous Given 10/12/23 1153)   ceFEPIme (MAXIPIME) 1 g in dextrose 5 % in water (D5W) 100 mL IVPB (MB+) (0 g Intravenous Stopped 10/12/23 1416)     Medical Decision Making  See ED course for the remainder of care    Amount and/or Complexity of Data Reviewed  Labs: ordered.  Radiology: ordered. Decision-making details documented in ED Course.    Risk  OTC drugs.  Prescription drug management.               ED Course as of 10/12/23 1556   Thu Oct 12, 2023   1030 EKG 12-lead  Sinus tachycardia 0 110 beats per minute, all intervals within normal limits, no STEMI [BP]   1218 28-year-old male in mild distress due to abdominal pain.  I treated his pain with Dilaudid  and droperidol.    Differential includes but not limited to worsening necrotizing pancreatitis versus gastritis versus hyperemesis [BP]   1350 X-Ray Chest 1 View  Small new left sided pleural effusion [BP]   1351 CT Abdomen Pelvis With Contrast(!)  Grossly stable from prior study, with re demonstration of pancreatitis with pseudocyst and splenic vein thrombosis that is nonocclusive [BP]   1554 Patient was ultimately admitted to Hospital Medicine for pain control.  Patient was agreeable to admission. [BP]      ED Course User Index  [BP] Marco Harris MD                    Clinical Impression:   Final diagnoses:  [R00.0] Tachycardia  [R10.9] Abdominal pain  [R11.2] Nausea and vomiting, unspecified vomiting type        ED Disposition Condition    Observation Stable                Marco Harris MD  Resident  10/12/23 2674

## 2023-10-12 NOTE — PLAN OF CARE
[facility] - Emergency Dept  Initial Discharge Assessment       Primary Care Provider: Pina Jones NP    Admission Diagnosis: Abdominal pain [R10.9]    Admission Date: 10/12/2023  Expected Discharge Date: 10/15/2023    Pt stated he lives alone and is independent with his ambulation and ADL's and does not use equipment or require assistance.      Pt to d/c home with no needs when ready    Transition of Care Barriers: (P) None    Payor: MEDICAID / Plan: [facility] COMMUN[employer] PLAN [facility] ([place] MEDICAID) / Product Type: Managed Medicaid /     Extended Emergency Contact Information  Primary Emergency Contact: Dulce Denice  Mobile Phone: [phone]  Relation: Mother  Preferred language: English   needed? No    Discharge Plan A: (P) Home  Discharge Plan B: (P) Home      [id number] - [place], [place] - [address] AT [facility] [address]  [address]  [facility] [address]  Phone: [phone] Fax: [phone]    [facility] [place] - [address]  [address] [facility] [address]  [facility] [address]  Phone: [phone] Fax: [phone]      Initial Assessment (most recent)       Adult Discharge Assessment - 10/12/23 1507          Discharge Assessment    Assessment Type Discharge Planning Assessment (P)      Confirmed/corrected address, phone number and insurance Yes (P)      Confirmed Demographics Correct on Facesheet (P)      Source of Information patient (P)      Does patient/caregiver understand observation status Yes (P)      Reason For Admission Chronic pancreatitis (P)      People in Home alone (P)      Facility Arrived From: home (P)      Do you expect to return to your current living situation? Yes (P)      Do you have help at home or someone to help you manage your care at home? No (P)      Prior to hospitilization cognitive status: Alert/Oriented;No Deficits (P)      Current cognitive status: Alert/Oriented;No Deficits (P)       Home Accessibility wheelchair accessible (P)      Home Layout Able to live on 1st floor (P)      Equipment Currently Used at Home none (P)      Patient currently being followed by outpatient case management? No (P)      Do you currently have service(s) that help you manage your care at home? No (P)      Do you take prescription medications? Yes (P)      Do you have prescription coverage? No (P)      Do you have any problems affording any of your prescribed medications? No (P)      Is the patient taking medications as prescribed? yes (P)      Who is going to help you get home at discharge? family/friends (P)      How do you get to doctors appointments? car, drives self;other (see comments) (P)    Lyft/Uber    Are you on dialysis? No (P)      Do you take coumadin? No (P)      DME Needed Upon Discharge  none (P)      Discharge Plan discussed with: Patient (P)      Transition of Care Barriers None (P)      Discharge Plan A Home (P)      Discharge Plan B Home (P)                       Shamika Gallegos CD, MSW, LMSW, RSW   Case Management  [facility]  Email: [email or web address]

## 2023-10-12 NOTE — H&P
[facility] - Emergency Dept  [facility]  History & Physical    Patient Name: Tasia Cardona  MRN: [id number]  Patient Class: OP- Observation  Admission Date: 10/12/2023  Attending Physician: Osmin Dutta MD   Primary Care Provider: Pina Jones NP         Patient information was obtained from patient, past medical records and ER records.     Subjective:     Principal Problem:Chronic pancreatitis    Chief Complaint:   Chief Complaint   Patient presents with    Abdominal Pain    Emesis     Actively vomiting in triage/PICC line right upper arm- states receiving ABT for pancreatitis/abd abscess/ HIV+        HPI: 23M PMH HIV (on Biktarvy, last CD4 296 from 9/20/23), EtOH use, acute necrotizing pancreatitis, splenic vein thrombosis, asthma, and anemia who presents to ED with abdominal pain, nausea, and vomiting. He was recently discharged on 10/10 after admission for acute necrotizing pancreatitis. His abdominal pain never fully resolved following discharge, and he was sent home with Dilaudid PO 2 mg PRN. Patient woke up early this morning with severe, generalized abdominal pain with radiation to scapula, nausea, and blood-tinged emesis after arriving to the ED.He denies any recent EtOH use since discharge.     He was evaluated by [facility] on previous admission, who recommended against any intervention on the pancreatic collection due to immaturity. He was treated with cefepime (as recommended by ID), which was continued after discharge via midline and patient reports compliance. Recent endoscopy (9/18/23) notable for a small Corinne-Chauhan tear and gastric erythema.     In ED, /91, tachycardic (128), and afebrile. Labs notable for slight leukocytosis (13.01 - increased since discharge) and elevated lipase. CXR remarkable for small L pleural effusion. CT A/P similar to prior with lobulated peripancreatic fluid collection and thrombus within splenic vein but patent.        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    Mild intermittent asthma without complication 9/18/2023    Normocytic anemia 9/18/2023    Positive CMV IgG serology 9/21/2023    Splenic vein thrombosis 9/20/2023       Past Surgical History:   Procedure Laterality Date    ESOPHAGOGASTRODUODENOSCOPY N/A 9/18/2023    Procedure: EGD (ESOPHAGOGASTRODUODENOSCOPY);  Surgeon: Kg Cleaning MD;  Location: [address]);  Service: Endoscopy;  Laterality: N/A;       Review of patient's allergies indicates:   Allergen Reactions    [place] Swelling    Pcn [penicillins] Hives     Tolerates cephalosporins    Pecan nut Swelling    Soy     Sulfa (sulfonamide antibiotics) Hives       No current facility-administered medications on file prior to encounter.     Current Outpatient Medications on File Prior to Encounter   Medication Sig    hnpyubnor-mpabseop-mnrifap ala (BIKTARVY) -25 mg (25 kg or greater) Take 1 tablet by mouth once daily.    dextrose 5 % in water (D5W) PgBk 100 mL with ceFEPIme 2 gram SolR 2 g Inject 2 g into the vein every 12 (twelve) hours.    folic acid (FOLVITE) 1 MG tablet Take 1 tablet (1 mg total) by mouth once daily.    HYDROmorphone (DILAUDID) 2 MG tablet Take 1 tablet (2 mg total) by mouth every 3 (three) hours as needed for Pain.    pantoprazole (PROTONIX) 40 MG tablet Take 1 tablet (40 mg total) by mouth 2 (two) times daily.     Family History       Problem Relation (Age of Onset)    Breast cancer Maternal Grandmother    Cancer Mother, Brother    No Known Problems Father    Ovarian cancer Mother    Pancreatitis Mother          Tobacco Use    Smoking status: Some Days     Types: Cigarettes    Smokeless tobacco: Never   Substance and Sexual Activity    Alcohol use: Yes    Drug use: Never    Sexual activity: Not on file     Review of Systems    Constitutional:  Positive for chills. Negative for diaphoresis, fatigue and fever.   Respiratory:  Positive for shortness of breath. Negative for cough.    Cardiovascular:  Negative for chest pain, palpitations and leg swelling.   Gastrointestinal:  Positive for abdominal pain, diarrhea, nausea and vomiting. Negative for abdominal distention, blood in stool and constipation.   Musculoskeletal:  Negative for arthralgias and myalgias.   Neurological:  Negative for dizziness.   All other systems reviewed and are negative.    Objective:     Vital Signs (Most Recent):  Temp: 98.1 °F (36.7 °C) (10/12/23 1001)  Pulse: 105 (10/12/23 1400)  Resp: 18 (10/12/23 1224)  BP: 121/78 (10/12/23 1400)  SpO2: 97 % (10/12/23 1400) Vital Signs (24h Range):  Temp:  [98.1 °F (36.7 °C)] 98.1 °F (36.7 °C)  Pulse:  [] 105  Resp:  [18-22] 18  SpO2:  [95 %-98 %] 97 %  BP: (119-147)/(75-91) 121/78     Weight: 70.8 kg (156 lb)  Body mass index is 22.38 kg/m².     Physical Exam  Vitals and nursing note reviewed.   Constitutional:       General: He is not in acute distress.     Appearance: Normal appearance.   HENT:      Head: Normocephalic and atraumatic.   Eyes:      Extraocular Movements: Extraocular movements intact.      Pupils: Pupils are equal, round, and reactive to light.   Cardiovascular:      Rate and Rhythm: Normal rate and regular rhythm.      Pulses: Normal pulses.   Pulmonary:      Effort: Pulmonary effort is normal. No respiratory distress.   Abdominal:      General: Abdomen is flat. Bowel sounds are normal. There is no distension.      Palpations: Abdomen is soft.      Tenderness: There is abdominal tenderness (diffuse to light palpation). There is no rebound. Guarding: voluntary.  Musculoskeletal:         General: No swelling or tenderness. Normal range of motion.   Skin:     General: Skin is warm and dry.      Capillary Refill: Capillary refill takes less than 2 seconds.      Coloration: Skin is not jaundiced.    Neurological:      General: No focal deficit present.      Mental Status: He is alert and oriented to person, place, and time. Mental status is at baseline.      Cranial Nerves: No cranial nerve deficit.      Motor: No weakness.              CRANIAL NERVES     CN III, IV, VI   Pupils are equal, round, and reactive to light.       Significant Labs: All pertinent labs within the past 24 hours have been reviewed.    Significant Imaging: I have reviewed all pertinent imaging results/findings within the past 24 hours.    Assessment/Plan:     * Chronic pancreatitis  23M PMH HIV (on Biktarvy, last CD4 296 from 9/20/23), EtOH use, acute necrotizing pancreatitis, splenic vein thrombosis, asthma, and anemia who presents to ED with abdominal pain, nausea, and vomiting. Recent discharge on 10/10 after admission for acute necrotizing pancreatitis. Interval increase WBC and lipase. Imaging completed on this admission without interval changes to collection size or air suggesting abscess. [facility] consulted on previous admission, who deferred against tapping collection due to immaturity. ID consulted for antibiotic coverage on previous admission, and patient was discharged with cefepime after midline placement.    - Continue Cefepime  - Consult [facility]  - CLD  - Pain control, nausea meds          SIRS (systemic inflammatory response syndrome)  - See A/P for chronic pancreatitis      Splenic vein thrombosis  Heme/onc consulted on previous admission after diagnosis of partially occlusive splenic vein thrombosis. Anticoagulation was deferred at that time, pending repeat imaging. On CT this admission, there is continued apparent thrombosis.  - Will continue to monitor  - Consider repeat US and possible initiation of AC      Corinne-Chauhan tear  Per ED chart review, patient with blood-tinged emesis. Patient denies recent vomiting since discharge on 10/10. EGD from Sep 2023 notable for gastric erythema and small MW tear.     - Continue PPI  "      Mild intermittent asthma without complication  History of asthma requiring PRN albuterol. Has not recently been using any inhaler treatment.       Polycythemia vera  History of PV requiring therapeutic phlebotomy. Blood counts within appropriate range at this time.  - Continue to monitor daily CBC      HIV infection  This patient in known to have HIV+ status (Have detected HIV PCR but never CD4 <200 or AIDS defining illness). Labs reviewed- No results found for: "CD4", No results found for: "HIVDNAPCR". Patient is on HAART. Will continue. Continue to monitor routine labs. Last CD4 count was   Lab Results   Component Value Date    ABSOLUTECD4 296 (L) 09/20/2023       - Continue home Biktarvy  - F/u repeat CD4  - We will not consult Infectious disease at this time. Other HIV-associated diseases are not present.        VTE Risk Mitigation (From admission, onward)         Ordered     IP VTE HIGH RISK PATIENT  Once         10/12/23 1713     Place sequential compression device  Until discontinued         10/12/23 1713     Reason for No Pharmacological VTE Prophylaxis  Once        Question:  Reasons:  Answer:  Risk of Bleeding    10/12/23 1713     Place sequential compression device  Until discontinued         10/12/23 1508                 Noah Trinh MD  Department of Hospital Medicine  [facility] - Emergency Dept  "

## 2023-10-13 LAB
ALBUMIN SERPL BCP-MCNC: 2.6 G/DL (ref 3.5–5.2)
ALP SERPL-CCNC: 64 U/L (ref 55–135)
ALT SERPL W/O P-5'-P-CCNC: 16 U/L (ref 10–44)
ANION GAP SERPL CALC-SCNC: 10 MMOL/L (ref 8–16)
AST SERPL-CCNC: 20 U/L (ref 10–40)
BASOPHILS # BLD AUTO: 0.04 K/UL (ref 0–0.2)
BASOPHILS NFR BLD: 0.7 % (ref 0–1.9)
BILIRUB SERPL-MCNC: 0.4 MG/DL (ref 0.1–1)
BUN SERPL-MCNC: 4 MG/DL (ref 6–20)
CALCIUM SERPL-MCNC: 8.5 MG/DL (ref 8.7–10.5)
CD3+CD4+ CELLS # BLD: 1080 CELLS/UL (ref 300–1400)
CD3+CD4+ CELLS NFR BLD: 38.3 % (ref 28–57)
CHLORIDE SERPL-SCNC: 105 MMOL/L (ref 95–110)
CO2 SERPL-SCNC: 21 MMOL/L (ref 23–29)
CREAT SERPL-MCNC: 0.6 MG/DL (ref 0.5–1.4)
DIFFERENTIAL METHOD: ABNORMAL
EOSINOPHIL # BLD AUTO: 0.5 K/UL (ref 0–0.5)
EOSINOPHIL NFR BLD: 8.2 % (ref 0–8)
ERYTHROCYTE [DISTWIDTH] IN BLOOD BY AUTOMATED COUNT: 18.7 % (ref 11.5–14.5)
EST. GFR  (NO RACE VARIABLE): >60 ML/MIN/1.73 M^2
GLUCOSE SERPL-MCNC: 91 MG/DL (ref 70–110)
HCT VFR BLD AUTO: 30.7 % (ref 40–54)
HGB BLD-MCNC: 9.4 G/DL (ref 14–18)
IMM GRANULOCYTES # BLD AUTO: 0.02 K/UL (ref 0–0.04)
IMM GRANULOCYTES NFR BLD AUTO: 0.3 % (ref 0–0.5)
LYMPHOCYTES # BLD AUTO: 1.9 K/UL (ref 1–4.8)
LYMPHOCYTES NFR BLD: 31.5 % (ref 18–48)
MAGNESIUM SERPL-MCNC: 1.7 MG/DL (ref 1.6–2.6)
MCH RBC QN AUTO: 28.5 PG (ref 27–31)
MCHC RBC AUTO-ENTMCNC: 30.6 G/DL (ref 32–36)
MCV RBC AUTO: 93 FL (ref 82–98)
MONOCYTES # BLD AUTO: 0.7 K/UL (ref 0.3–1)
MONOCYTES NFR BLD: 11.6 % (ref 4–15)
NEUTROPHILS # BLD AUTO: 2.9 K/UL (ref 1.8–7.7)
NEUTROPHILS NFR BLD: 47.7 % (ref 38–73)
NRBC BLD-RTO: 0 /100 WBC
PHOSPHATE SERPL-MCNC: 4.7 MG/DL (ref 2.7–4.5)
PLATELET # BLD AUTO: 306 K/UL (ref 150–450)
PMV BLD AUTO: 10.4 FL (ref 9.2–12.9)
POTASSIUM SERPL-SCNC: 3.6 MMOL/L (ref 3.5–5.1)
PROT SERPL-MCNC: 5.9 G/DL (ref 6–8.4)
RBC # BLD AUTO: 3.3 M/UL (ref 4.6–6.2)
SODIUM SERPL-SCNC: 136 MMOL/L (ref 136–145)
WBC # BLD AUTO: 6.13 K/UL (ref 3.9–12.7)

## 2023-10-13 PROCEDURE — 99222 PR INITIAL HOSPITAL CARE,LEVL II: ICD-10-PCS | Mod: ,,, | Performed by: INTERNAL MEDICINE

## 2023-10-13 PROCEDURE — 99222 1ST HOSP IP/OBS MODERATE 55: CPT | Mod: ,,, | Performed by: INTERNAL MEDICINE

## 2023-10-13 PROCEDURE — 83735 ASSAY OF MAGNESIUM: CPT

## 2023-10-13 PROCEDURE — 25000003 PHARM REV CODE 250

## 2023-10-13 PROCEDURE — 85025 COMPLETE CBC W/AUTO DIFF WBC: CPT

## 2023-10-13 PROCEDURE — 84100 ASSAY OF PHOSPHORUS: CPT

## 2023-10-13 PROCEDURE — 63600175 PHARM REV CODE 636 W HCPCS

## 2023-10-13 PROCEDURE — 12000002 HC ACUTE/MED SURGE SEMI-PRIVATE ROOM

## 2023-10-13 PROCEDURE — 80053 COMPREHEN METABOLIC PANEL: CPT

## 2023-10-13 PROCEDURE — 99233 PR SUBSEQUENT HOSPITAL CARE,LEVL III: ICD-10-PCS | Mod: ,,, | Performed by: HOSPITALIST

## 2023-10-13 PROCEDURE — 99233 SBSQ HOSP IP/OBS HIGH 50: CPT | Mod: ,,, | Performed by: HOSPITALIST

## 2023-10-13 PROCEDURE — 36415 COLL VENOUS BLD VENIPUNCTURE: CPT

## 2023-10-13 RX ORDER — SODIUM CHLORIDE, SODIUM LACTATE, POTASSIUM CHLORIDE, CALCIUM CHLORIDE 600; 310; 30; 20 MG/100ML; MG/100ML; MG/100ML; MG/100ML
INJECTION, SOLUTION INTRAVENOUS CONTINUOUS
Status: ACTIVE | OUTPATIENT
Start: 2023-10-13 | End: 2023-10-13

## 2023-10-13 RX ORDER — SODIUM CHLORIDE, SODIUM LACTATE, POTASSIUM CHLORIDE, CALCIUM CHLORIDE 600; 310; 30; 20 MG/100ML; MG/100ML; MG/100ML; MG/100ML
INJECTION, SOLUTION INTRAVENOUS CONTINUOUS
Status: DISCONTINUED | OUTPATIENT
Start: 2023-10-13 | End: 2023-10-13

## 2023-10-13 RX ADMIN — HYDROMORPHONE HYDROCHLORIDE 2 MG: 1 INJECTION, SOLUTION INTRAMUSCULAR; INTRAVENOUS; SUBCUTANEOUS at 10:10

## 2023-10-13 RX ADMIN — ONDANSETRON 4 MG: 2 INJECTION INTRAMUSCULAR; INTRAVENOUS at 10:10

## 2023-10-13 RX ADMIN — CEFEPIME 2 G: 2 INJECTION, POWDER, FOR SOLUTION INTRAVENOUS at 11:10

## 2023-10-13 RX ADMIN — BICTEGRAVIR SODIUM, EMTRICITABINE, AND TENOFOVIR ALAFENAMIDE FUMARATE 1 TABLET: 50; 200; 25 TABLET ORAL at 10:10

## 2023-10-13 RX ADMIN — APIXABAN 10 MG: 5 TABLET, FILM COATED ORAL at 09:10

## 2023-10-13 RX ADMIN — CEFEPIME 2 G: 2 INJECTION, POWDER, FOR SOLUTION INTRAVENOUS at 03:10

## 2023-10-13 RX ADMIN — HYDROMORPHONE HYDROCHLORIDE 2 MG: 1 INJECTION, SOLUTION INTRAMUSCULAR; INTRAVENOUS; SUBCUTANEOUS at 01:10

## 2023-10-13 RX ADMIN — FOLIC ACID 1 MG: 1 TABLET ORAL at 10:10

## 2023-10-13 RX ADMIN — HYDROMORPHONE HYDROCHLORIDE 2 MG: 1 INJECTION, SOLUTION INTRAMUSCULAR; INTRAVENOUS; SUBCUTANEOUS at 12:10

## 2023-10-13 RX ADMIN — HYDROMORPHONE HYDROCHLORIDE 2 MG: 1 INJECTION, SOLUTION INTRAMUSCULAR; INTRAVENOUS; SUBCUTANEOUS at 03:10

## 2023-10-13 RX ADMIN — SODIUM CHLORIDE, POTASSIUM CHLORIDE, SODIUM LACTATE AND CALCIUM CHLORIDE: 600; 310; 30; 20 INJECTION, SOLUTION INTRAVENOUS at 11:10

## 2023-10-13 RX ADMIN — HYDROMORPHONE HYDROCHLORIDE 2 MG: 1 INJECTION, SOLUTION INTRAMUSCULAR; INTRAVENOUS; SUBCUTANEOUS at 04:10

## 2023-10-13 RX ADMIN — PANTOPRAZOLE SODIUM 40 MG: 40 TABLET, DELAYED RELEASE ORAL at 09:10

## 2023-10-13 RX ADMIN — PANTOPRAZOLE SODIUM 40 MG: 40 TABLET, DELAYED RELEASE ORAL at 10:10

## 2023-10-13 RX ADMIN — SODIUM CHLORIDE, POTASSIUM CHLORIDE, SODIUM LACTATE AND CALCIUM CHLORIDE: 600; 310; 30; 20 INJECTION, SOLUTION INTRAVENOUS at 12:10

## 2023-10-13 RX ADMIN — ONDANSETRON 4 MG: 2 INJECTION INTRAMUSCULAR; INTRAVENOUS at 12:10

## 2023-10-13 RX ADMIN — HYDROMORPHONE HYDROCHLORIDE 2 MG: 1 INJECTION, SOLUTION INTRAMUSCULAR; INTRAVENOUS; SUBCUTANEOUS at 08:10

## 2023-10-13 RX ADMIN — HYDROMORPHONE HYDROCHLORIDE 2 MG: 1 INJECTION, SOLUTION INTRAMUSCULAR; INTRAVENOUS; SUBCUTANEOUS at 06:10

## 2023-10-13 RX ADMIN — APIXABAN 10 MG: 5 TABLET, FILM COATED ORAL at 11:10

## 2023-10-13 RX ADMIN — Medication 6 MG: at 09:10

## 2023-10-13 RX ADMIN — CEFEPIME 2 G: 2 INJECTION, POWDER, FOR SOLUTION INTRAVENOUS at 08:10

## 2023-10-13 NOTE — SUBJECTIVE & OBJECTIVE
Interval History: Patient with continued generalized abdominal pain, though most pronounced in the epigastric area and has asked for PRN pain meds every 2H. Remains nuaseated that improves with zofran. Has been able to tolerate his CLD without emesis today.     Review of Systems  Objective:     Vital Signs (Most Recent):  Temp: 98.5 °F (36.9 °C) (10/13/23 1113)  Pulse: 87 (10/13/23 1113)  Resp: 18 (10/13/23 1312)  BP: 120/71 (10/13/23 1113)  SpO2: (!) 93 % (10/13/23 1113) Vital Signs (24h Range):  Temp:  [98.5 °F (36.9 °C)-99.4 °F (37.4 °C)] 98.5 °F (36.9 °C)  Pulse:  [85-99] 87  Resp:  [15-20] 18  SpO2:  [93 %-97 %] 93 %  BP: (118-131)/(71-83) 120/71     Weight: 70.8 kg (156 lb)  Body mass index is 22.38 kg/m².  No intake or output data in the 24 hours ending 10/13/23 1513      Physical Exam        Significant Labs: All pertinent labs within the past 24 hours have been reviewed.    Significant Imaging: I have reviewed all pertinent imaging results/findings within the past 24 hours.

## 2023-10-13 NOTE — MEDICAL/APP STUDENT
[facility]   Progress Note  [facility]    Admit Date: 10/12/2023  Hospital Day: 0  10/13/2023  1:50 PM    SUBJECTIVE:   Mr. Tasia Cardona is a 23 y.o. male with a relevant medical history of HIV infection, ETOH abuse, acute necrotizing pancreatitis, splenic vein thrombosis asthma and anemia who is being followed up for Chronic pancreatitis.    Interval history: Today the pt was laying in bed. He reports no improvement in abdominal pain. Currently he is receiving Dilaudid 2 mg IV q3. He denies any episodes of N/V since being started on IV Zofran. He has not had a bowel movement since coming to the hospital. D/t the use of opioids by this pt we will monitor bowel function. VS over night were stable. GI consulted and they report that endoscopic drainage would not be appropriate at this time. We will cont to monitor pt for worsening signs and symptoms    Review of Systems   Constitutional:  Positive for malaise/fatigue. Negative for chills and fever.   HENT:  Negative for congestion and sore throat.    Eyes:  Negative for blurred vision.   Respiratory:  Positive for cough and shortness of breath. Negative for hemoptysis and sputum production.    Cardiovascular:  Negative for chest pain and palpitations.   Gastrointestinal:  Positive for abdominal pain, heartburn, nausea and vomiting. Negative for blood in stool, constipation and diarrhea.   Musculoskeletal:  Positive for back pain. Negative for myalgias.   Skin:  Negative for rash.   Neurological:  Negative for dizziness, tingling, sensory change, focal weakness and headaches.   Endo/Heme/Allergies:  Does not bruise/bleed easily.   Psychiatric/Behavioral:  Negative for depression. The patient is nervous/anxious.      Please refer to the H&P for past medical, family, and social history.    OBJECTIVE:     Vital Signs Recent:  Temp: 98.5 °F (36.9 °C) (10/13/23 1113)  Pulse: 87 (10/13/23 1113)  Resp: 18 (10/13/23 1312)  BP: 120/71 (10/13/23 1113)  SpO2:  (!) 93 % (10/13/23 1113)  Oxygen Documentation:                Device (Oxygen Therapy): room air         Vital Signs Range (Last 24H):  Temp:  [98.5 °F (36.9 °C)-99.4 °F (37.4 °C)]   Pulse:  []   Resp:  [15-20]   BP: (118-131)/(71-83)   SpO2:  [93 %-97 %]        I & O (Last 24H):No intake or output data in the 24 hours ending 10/13/23 1350     Physical Exam  Constitutional:       General: He is not in acute distress.     Appearance: Normal appearance. He is ill-appearing.   HENT:      Head: Normocephalic and atraumatic.      Nose: Nose normal. No congestion.      Mouth/Throat:      Mouth: Mucous membranes are moist.      Pharynx: Oropharynx is clear. No posterior oropharyngeal erythema.   Eyes:      Pupils: Pupils are equal, round, and reactive to light.   Cardiovascular:      Rate and Rhythm: Regular rhythm. Tachycardia present.      Pulses: Normal pulses.      Heart sounds: No murmur heard.     No friction rub. No gallop.   Pulmonary:      Effort: No respiratory distress.      Breath sounds: Normal breath sounds. No stridor. No wheezing.   Abdominal:      General: Abdomen is flat.      Palpations: Abdomen is soft.      Tenderness: There is abdominal tenderness. There is guarding.   Musculoskeletal:         General: No swelling, tenderness, deformity or signs of injury.   Skin:     General: Skin is warm and dry.      Coloration: Skin is not jaundiced.   Neurological:      General: No focal deficit present.      Mental Status: He is alert and oriented to person, place, and time.   Psychiatric:         Mood and Affect: Mood normal.         Thought Content: Thought content normal.     Labs:   Recent Labs   Lab 10/12/23  1146 10/13/23  0539    136   K 3.6 3.6    105   CO2 20* 21*   BUN 5* 4*   CREATININE 0.7 0.6    91   CALCIUM 9.5 8.5*   MG  --  1.7   PHOS  --  4.7*     Recent Labs   Lab 10/08/23  0542 10/12/23  1146 10/13/23  0539   ALKPHOS 65 81 64   ALT 8* 26 16   AST 18 42* 20   ALBUMIN  2.6* 3.5 2.6*   PROT 6.3 8.1 5.9*   BILITOT 0.3 0.3 0.4     Recent Labs   Lab 10/08/23  0542 10/12/23  1146 10/12/23  1155 10/13/23  0539   WBC 6.08 13.01*  --  6.13   HGB 9.7* 12.0*  --  9.4*   HCT 31.8* 38.5* 38 30.7*    453*  --  306       Diagnostic Results:  No results to report in the past 24 hours    Scheduled Meds:   apixaban  10 mg Oral BID    Followed by    [START ON 10/20/2023] apixaban  5 mg Oral BID    omffnyxzs-obtjjlga-vvzozsi ala  1 tablet Oral Daily    ceFEPime (MAXIPIME) IVPB  2 g Intravenous Q8H    folic acid  1 mg Oral Daily    pantoprazole  40 mg Oral BID     Continuous Infusions:   lactated ringers 150 mL/hr at 10/13/23 1138     PRN Meds:acetaminophen, dextrose 10%, dextrose 10%, glucagon (human recombinant), glucose, glucose, HYDROmorphone, melatonin, naloxone, ondansetron, prochlorperazine, sodium chloride 0.9%, sodium chloride 0.9%    ASSESSMENT/PLAN:   Mr. Tasia Cardona is a 23 y.o. male with a relevant medical history of HIV infection, ETOH abuse, acute necrotizing pancreatitis, splenic vein thrombosis asthma and anemia who is being followed up for Chronic pancreatitis.    Active Hospital Problems    Diagnosis  POA    *Chronic pancreatitis [K86.1]  Yes    SIRS (systemic inflammatory response syndrome) [R65.10]  Yes    Splenic vein thrombosis [I82.890]  Yes    Mild intermittent asthma without complication [J45.20]  Yes     Chronic    Cornine-Chauhan tear [K22.6]  Yes    Polycythemia vera [D45]  Yes    HIV infection [B20]  Yes      Resolved Hospital Problems   No resolved problems to display.        Chronic pancreatitis  ASSESSMENT: 23M PMH HIV (on Biktarvy, last CD4 296 from 9/20/23), EtOH use, acute necrotizing pancreatitis, splenic vein thrombosis, asthma, and anemia who presents to ED with abdominal pain, nausea, and vomiting. Recent discharge on 10/10 after admission for acute necrotizing pancreatitis. Interval increase WBC and lipase. Imaging completed on this admission without  interval changes to collection size or air suggesting abscess. [facility] consulted on previous admission, who deferred against tapping collection due to immaturity. ID consulted for antibiotic coverage on previous admission, and patient was discharged with cefepime after midline placement  PLAN:  -IV NS fluids   -Pain control  -advance diet as tolerated  -Cont pantoprazole table 40 mg BID  -cont cefepime 2 g in dextrose 5% in water 100 mL IVPB     2. Splenic Vein Thrombosis  Plan:  -apixaban tablet 10 mg and 5 mg PO daily     3. HIV  ASSESSMENT: pt has a previous diagnosis of HIV which is currently being treated. Last CD4 count was 296 on 09/20/23   PLAN:  -cont lxgxdfefx-jllmtozv-lzxhbvy ala -25 mg (25 kg or greater) 1 tablet daily     4. Mild intermittent asthma without complication  ASSESSMENT: pt has chronic asthma  PLAN:  -cont home regimen PRN  -observe for exacerbation     5. DVT Prophylaxis: compression devise until discontinued      Discharge planning:   Estimated Date of Discharge: Unknown  Code Status: Full Code  Discharge is contingent on patient. Patients VS must be stable, pt must remain afebrile for 24 hrs, pt must be adequately ambulatory     GAVIN Ferraro IV, Medical student

## 2023-10-13 NOTE — ASSESSMENT & PLAN NOTE
"This patient in known to have HIV+ status (Have detected HIV PCR but never CD4 <200 or AIDS defining illness). Labs reviewed- No results found for: "CD4", No results found for: "HIVDNAPCR". Patient is on HAART. Will continue. Continue to monitor routine labs.   Lab Results   Component Value Date    ABSOLUTECD4 1080 10/12/2023       - Continue home Biktarvy  - We will not consult Infectious disease at this time. Other HIV-associated diseases are not present.    "

## 2023-10-13 NOTE — PLAN OF CARE
New patient admitted from ED. AAOX4. Pt. arrived with midline in right upper arm from home. Midline flushed upon arrival in ED but will not flush following procedure with contrast. Medical team paged for notification, but did not receive a call back. IV ABX and PRN medication given for pain. Continuous  mL/hr. No adverse events noted during this shift.    Problem: Adult Inpatient Plan of Care  Goal: Plan of Care Review  Outcome: Ongoing, Progressing  Flowsheets (Taken 10/13/2023 0556)  Plan of Care Reviewed With: patient  Goal: Absence of Hospital-Acquired Illness or Injury  Outcome: Ongoing, Progressing  Intervention: Identify and Manage Fall Risk  Flowsheets (Taken 10/13/2023 0556)  Safety Promotion/Fall Prevention:   assistive device/personal item within reach   nonskid shoes/socks when out of bed   room near unit station   side rails raised x 2   medications reviewed  Goal: Optimal Comfort and Wellbeing  Outcome: Ongoing, Progressing  Intervention: Monitor Pain and Promote Comfort  Flowsheets (Taken 10/13/2023 0556)  Pain Management Interventions:   care clustered   medication offered   pillow support provided   quiet environment facilitated   warm blanket provided

## 2023-10-13 NOTE — ASSESSMENT & PLAN NOTE
Per ED chart review, patient with blood-tinged emesis. Patient denies recent vomiting since discharge on 10/10. EGD from Sep 2023 notable for gastric erythema and small MW tear.     - Continue PPI   - monitor for signs of hematemesis, melena as starting Eliquis 10/13

## 2023-10-13 NOTE — PROGRESS NOTES
[facility] ([address] Medicine  Progress Note    Patient Name: Tasia Cardona  MRN: [id number]  Patient Class: IP- Inpatient   Admission Date: 10/12/2023  Length of Stay: 0 days  Attending Physician: Osmin Dutta MD  Primary Care Provider: Pina Jones NP        Subjective:     Principal Problem:Chronic pancreatitis        HPI:  23M PMH HIV (on Biktarvy, last CD4 296 from 9/20/23), EtOH use, acute necrotizing pancreatitis, splenic vein thrombosis, asthma, and anemia who presents to ED with abdominal pain, nausea, and vomiting. He was recently discharged on 10/10 after admission for acute necrotizing pancreatitis. His abdominal pain never fully resolved following discharge, and he was sent home with Dilaudid PO 2 mg PRN. Patient woke up early this morning with severe, generalized abdominal pain with radiation to scapula, nausea, and blood-tinged emesis after arriving to the ED.He denies any recent EtOH use since discharge.     He was evaluated by AES on previous admission, who recommended against any intervention on the pancreatic collection due to immaturity. He was treated with cefepime (as recommended by ID), which was continued after discharge via midline and patient reports compliance. Recent endoscopy (9/18/23) notable for a small Corinne-Chauhan tear and gastric erythema.     In ED, /91, tachycardic (128), and afebrile. CBC notable for slight leukocytosis (13.01 - increased since discharge) and CMP unremarkable. EKG w/ sinus tachycardia. CXR remarkable for small L pleural effusion. CT A/P similar to prior with lobulated peripancreatic fluid collection and thrombus within splenic vein but patent.        Overview/Hospital Course:  No notes on file    Interval History: Patient with continued generalized abdominal pain, though most pronounced in the epigastric area and has asked for PRN pain meds every 2H. Remains nuaseated that improves with zofran. Has been able to  tolerate his CLD without emesis today.     Review of Systems  Objective:     Vital Signs (Most Recent):  Temp: 98.5 °F (36.9 °C) (10/13/23 1113)  Pulse: 87 (10/13/23 1113)  Resp: 18 (10/13/23 1312)  BP: 120/71 (10/13/23 1113)  SpO2: (!) 93 % (10/13/23 1113) Vital Signs (24h Range):  Temp:  [98.5 °F (36.9 °C)-99.4 °F (37.4 °C)] 98.5 °F (36.9 °C)  Pulse:  [85-99] 87  Resp:  [15-20] 18  SpO2:  [93 %-97 %] 93 %  BP: (118-131)/(71-83) 120/71     Weight: 70.8 kg (156 lb)  Body mass index is 22.38 kg/m².  No intake or output data in the 24 hours ending 10/13/23 1513      Physical Exam        Significant Labs: All pertinent labs within the past 24 hours have been reviewed.    Significant Imaging: I have reviewed all pertinent imaging results/findings within the past 24 hours.      Assessment/Plan:      * Chronic pancreatitis  23M PMH HIV (on Biktarvy, last CD4 296 from 9/20/23), EtOH use, acute necrotizing pancreatitis, splenic vein thrombosis, asthma, and anemia who presents to ED with abdominal pain, nausea, and vomiting. Recent discharge on 10/10 after admission for acute necrotizing pancreatitis. Interval increase WBC and lipase. Imaging completed on this admission without interval changes to collection size or air suggesting abscess. AES consulted on this admission, who deferred against tapping collection due to immaturity. ID consulted for antibiotic coverage on previous admission, and patient was discharged with cefepime after midline placement.    - Continue Cefepime  - AES consulted  - CLD  - Pain control, nausea meds          SIRS (systemic inflammatory response syndrome)  - See A/P for chronic pancreatitis      Splenic vein thrombosis  Heme/onc consulted on previous admission after diagnosis of partially occlusive splenic vein thrombosis. Anticoagulation was deferred at that time, pending repeat imaging. On CT this admission, there is continued apparent thrombosis.  - Start Eliquis loading dose      Corinne-Chauhan  "tear  Per ED chart review, patient with blood-tinged emesis. Patient denies recent vomiting since discharge on 10/10. EGD from Sep 2023 notable for gastric erythema and small MW tear.     - Continue PPI   - monitor for signs of hematemesis, melena as starting Eliquis 10/13      Mild intermittent asthma without complication  History of asthma requiring PRN albuterol. Has not recently been using any inhaler treatment.       Polycythemia vera  History of PV requiring therapeutic phlebotomy. Blood counts within appropriate range at this time.  - Continue to monitor daily CBC      HIV infection  This patient in known to have HIV+ status (Have detected HIV PCR but never CD4 <200 or AIDS defining illness). Labs reviewed- No results found for: "CD4", No results found for: "HIVDNAPCR". Patient is on HAART. Will continue. Continue to monitor routine labs.   Lab Results   Component Value Date    ABSOLUTECD4 1080 10/12/2023       - Continue home Biktarvy  - We will not consult Infectious disease at this time. Other HIV-associated diseases are not present.        VTE Risk Mitigation (From admission, onward)         Ordered     apixaban tablet 5 mg  2 times daily        See Hyperspace for full Linked Orders Report.    10/13/23 1036     apixaban tablet 10 mg  2 times daily        See Hyperspace for full Linked Orders Report.    10/13/23 1036     IP VTE HIGH RISK PATIENT  Once         10/12/23 1713     Place sequential compression device  Until discontinued         10/12/23 1713     Reason for No Pharmacological VTE Prophylaxis  Once        Question:  Reasons:  Answer:  Risk of Bleeding    10/12/23 1713     Place sequential compression device  Until discontinued         10/12/23 1508                Discharge Planning   CASTILLO: 10/15/2023     Code Status: Full Code   Is the patient medically ready for discharge?: No    Reason for patient still in hospital (select all that apply): Treatment  Discharge Plan A: Home          Noah Trinh, " MD  Department of Hospital Medicine   [facility] ([place])

## 2023-10-13 NOTE — ASSESSMENT & PLAN NOTE
23M PMH HIV (on Biktarvy, last CD4 296 from 9/20/23), EtOH use, acute necrotizing pancreatitis, splenic vein thrombosis, asthma, and anemia who presents to ED with abdominal pain, nausea, and vomiting. Recent discharge on 10/10 after admission for acute necrotizing pancreatitis. Interval increase WBC and lipase. Imaging completed on this admission without interval changes to collection size or air suggesting abscess. AES consulted on this admission, who deferred against tapping collection due to immaturity. ID consulted for antibiotic coverage on previous admission, and patient was discharged with cefepime after midline placement.    - Continue Cefepime  - AES consulted  - CLD  - Pain control, nausea meds

## 2023-10-13 NOTE — ED NOTES
Assumed pt care at this time. The patient is awake, alert and cooperative with a calm affect, patient is aware of environment. Airway is open and patent, respirations are spontaneous, normal respiratory effort and rate noted. Skin warm and dry, moves all extremities well. No apparent distress noted. Pt states no needs at this time. Bed locked and in lowest position with side rails up, call light within reach.

## 2023-10-13 NOTE — ASSESSMENT & PLAN NOTE
Heme/onc consulted on previous admission after diagnosis of partially occlusive splenic vein thrombosis. Anticoagulation was deferred at that time, pending repeat imaging. On CT this admission, there is continued apparent thrombosis.  - Start Eliquis loading dose

## 2023-10-14 LAB
ALBUMIN SERPL BCP-MCNC: 2.6 G/DL (ref 3.5–5.2)
ALP SERPL-CCNC: 62 U/L (ref 55–135)
ALT SERPL W/O P-5'-P-CCNC: 14 U/L (ref 10–44)
ANION GAP SERPL CALC-SCNC: 12 MMOL/L (ref 8–16)
AST SERPL-CCNC: 20 U/L (ref 10–40)
BASOPHILS # BLD AUTO: 0.02 K/UL (ref 0–0.2)
BASOPHILS NFR BLD: 0.4 % (ref 0–1.9)
BILIRUB SERPL-MCNC: 0.3 MG/DL (ref 0.1–1)
BUN SERPL-MCNC: 4 MG/DL (ref 6–20)
CALCIUM SERPL-MCNC: 8.5 MG/DL (ref 8.7–10.5)
CHLORIDE SERPL-SCNC: 104 MMOL/L (ref 95–110)
CO2 SERPL-SCNC: 21 MMOL/L (ref 23–29)
CREAT SERPL-MCNC: 0.6 MG/DL (ref 0.5–1.4)
DIFFERENTIAL METHOD: ABNORMAL
EOSINOPHIL # BLD AUTO: 0.4 K/UL (ref 0–0.5)
EOSINOPHIL NFR BLD: 8 % (ref 0–8)
ERYTHROCYTE [DISTWIDTH] IN BLOOD BY AUTOMATED COUNT: 19.1 % (ref 11.5–14.5)
EST. GFR  (NO RACE VARIABLE): >60 ML/MIN/1.73 M^2
GLUCOSE SERPL-MCNC: 104 MG/DL (ref 70–110)
HCT VFR BLD AUTO: 31.2 % (ref 40–54)
HGB BLD-MCNC: 9.7 G/DL (ref 14–18)
IMM GRANULOCYTES # BLD AUTO: 0.01 K/UL (ref 0–0.04)
IMM GRANULOCYTES NFR BLD AUTO: 0.2 % (ref 0–0.5)
LYMPHOCYTES # BLD AUTO: 1.6 K/UL (ref 1–4.8)
LYMPHOCYTES NFR BLD: 32.6 % (ref 18–48)
MAGNESIUM SERPL-MCNC: 1.7 MG/DL (ref 1.6–2.6)
MCH RBC QN AUTO: 29 PG (ref 27–31)
MCHC RBC AUTO-ENTMCNC: 31.1 G/DL (ref 32–36)
MCV RBC AUTO: 93 FL (ref 82–98)
MONOCYTES # BLD AUTO: 0.6 K/UL (ref 0.3–1)
MONOCYTES NFR BLD: 11 % (ref 4–15)
NEUTROPHILS # BLD AUTO: 2.4 K/UL (ref 1.8–7.7)
NEUTROPHILS NFR BLD: 47.8 % (ref 38–73)
NRBC BLD-RTO: 0 /100 WBC
PHOSPHATE SERPL-MCNC: 4.5 MG/DL (ref 2.7–4.5)
PLATELET # BLD AUTO: 280 K/UL (ref 150–450)
PMV BLD AUTO: 10.9 FL (ref 9.2–12.9)
POTASSIUM SERPL-SCNC: 4.1 MMOL/L (ref 3.5–5.1)
PROT SERPL-MCNC: 5.9 G/DL (ref 6–8.4)
RBC # BLD AUTO: 3.34 M/UL (ref 4.6–6.2)
SODIUM SERPL-SCNC: 137 MMOL/L (ref 136–145)
WBC # BLD AUTO: 5 K/UL (ref 3.9–12.7)

## 2023-10-14 PROCEDURE — 25000003 PHARM REV CODE 250

## 2023-10-14 PROCEDURE — 63600175 PHARM REV CODE 636 W HCPCS

## 2023-10-14 PROCEDURE — 83735 ASSAY OF MAGNESIUM: CPT

## 2023-10-14 PROCEDURE — 12000002 HC ACUTE/MED SURGE SEMI-PRIVATE ROOM

## 2023-10-14 PROCEDURE — 36415 COLL VENOUS BLD VENIPUNCTURE: CPT

## 2023-10-14 PROCEDURE — 99233 SBSQ HOSP IP/OBS HIGH 50: CPT | Mod: ,,, | Performed by: HOSPITALIST

## 2023-10-14 PROCEDURE — 84100 ASSAY OF PHOSPHORUS: CPT

## 2023-10-14 PROCEDURE — 85025 COMPLETE CBC W/AUTO DIFF WBC: CPT

## 2023-10-14 PROCEDURE — 80053 COMPREHEN METABOLIC PANEL: CPT

## 2023-10-14 PROCEDURE — 99233 PR SUBSEQUENT HOSPITAL CARE,LEVL III: ICD-10-PCS | Mod: ,,, | Performed by: HOSPITALIST

## 2023-10-14 RX ORDER — SODIUM CHLORIDE, SODIUM LACTATE, POTASSIUM CHLORIDE, CALCIUM CHLORIDE 600; 310; 30; 20 MG/100ML; MG/100ML; MG/100ML; MG/100ML
INJECTION, SOLUTION INTRAVENOUS CONTINUOUS
Status: ACTIVE | OUTPATIENT
Start: 2023-10-14 | End: 2023-10-14

## 2023-10-14 RX ADMIN — HYDROMORPHONE HYDROCHLORIDE 2 MG: 1 INJECTION, SOLUTION INTRAMUSCULAR; INTRAVENOUS; SUBCUTANEOUS at 09:10

## 2023-10-14 RX ADMIN — APIXABAN 10 MG: 5 TABLET, FILM COATED ORAL at 09:10

## 2023-10-14 RX ADMIN — SODIUM CHLORIDE, POTASSIUM CHLORIDE, SODIUM LACTATE AND CALCIUM CHLORIDE: 600; 310; 30; 20 INJECTION, SOLUTION INTRAVENOUS at 09:10

## 2023-10-14 RX ADMIN — HYDROMORPHONE HYDROCHLORIDE 2 MG: 1 INJECTION, SOLUTION INTRAMUSCULAR; INTRAVENOUS; SUBCUTANEOUS at 12:10

## 2023-10-14 RX ADMIN — ONDANSETRON 4 MG: 2 INJECTION INTRAMUSCULAR; INTRAVENOUS at 08:10

## 2023-10-14 RX ADMIN — HYDROMORPHONE HYDROCHLORIDE 2 MG: 1 INJECTION, SOLUTION INTRAMUSCULAR; INTRAVENOUS; SUBCUTANEOUS at 03:10

## 2023-10-14 RX ADMIN — CEFEPIME 2 G: 2 INJECTION, POWDER, FOR SOLUTION INTRAVENOUS at 08:10

## 2023-10-14 RX ADMIN — PANTOPRAZOLE SODIUM 40 MG: 40 TABLET, DELAYED RELEASE ORAL at 08:10

## 2023-10-14 RX ADMIN — BICTEGRAVIR SODIUM, EMTRICITABINE, AND TENOFOVIR ALAFENAMIDE FUMARATE 1 TABLET: 50; 200; 25 TABLET ORAL at 09:10

## 2023-10-14 RX ADMIN — HYDROMORPHONE HYDROCHLORIDE 2 MG: 1 INJECTION, SOLUTION INTRAMUSCULAR; INTRAVENOUS; SUBCUTANEOUS at 06:10

## 2023-10-14 RX ADMIN — CEFEPIME 2 G: 2 INJECTION, POWDER, FOR SOLUTION INTRAVENOUS at 11:10

## 2023-10-14 RX ADMIN — Medication 6 MG: at 09:10

## 2023-10-14 RX ADMIN — APIXABAN 10 MG: 5 TABLET, FILM COATED ORAL at 08:10

## 2023-10-14 RX ADMIN — SODIUM CHLORIDE, POTASSIUM CHLORIDE, SODIUM LACTATE AND CALCIUM CHLORIDE: 600; 310; 30; 20 INJECTION, SOLUTION INTRAVENOUS at 04:10

## 2023-10-14 RX ADMIN — FOLIC ACID 1 MG: 1 TABLET ORAL at 09:10

## 2023-10-14 RX ADMIN — CEFEPIME 2 G: 2 INJECTION, POWDER, FOR SOLUTION INTRAVENOUS at 03:10

## 2023-10-14 RX ADMIN — PANTOPRAZOLE SODIUM 40 MG: 40 TABLET, DELAYED RELEASE ORAL at 09:10

## 2023-10-14 RX ADMIN — ONDANSETRON 4 MG: 2 INJECTION INTRAMUSCULAR; INTRAVENOUS at 09:10

## 2023-10-14 NOTE — ASSESSMENT & PLAN NOTE
23M PMH HIV (on Biktarvy, last CD4 296 from 9/20/23), EtOH use, acute necrotizing pancreatitis, splenic vein thrombosis, asthma, and anemia who presents to ED with abdominal pain, nausea, and vomiting. Recent discharge on 10/10 after admission for acute necrotizing pancreatitis. Interval increase WBC and lipase. Imaging completed on this admission without interval changes to collection size or air suggesting abscess. AES consulted on this admission, who deferred against tapping collection due to immaturity. ID consulted for antibiotic coverage on previous admission, and patient was discharged with cefepime after midline placement.    - Continue Cefepime  - AES consulted, no intervention planned  - advancing diet as tolerated  - Pain control, nausea meds

## 2023-10-14 NOTE — MEDICAL/APP STUDENT
[facility] Medicine Student   [place]  [facility] [place] [facility] [place] [facility]    Admit Date: 10/12/2023  Hospital Day: 1  10/14/2023  11:58 AM    SUBJECTIVE:   Mr. Tasia Cardona is a 23 y.o. male with a relevant medical history of HIV infection, ETOH abuse, acute necrotizing pancreatitis, splenic vein thrombosis asthma and anemia who is being followed up for Chronic pancreatitis.    Interval history: Today pt was laying in bed reporting abdominal pain has remained constant. He is still having nausea with out emesis. He has not had a bowel movement since admission to the hospital. GI was consulted and reported nothing should be done acutely about pancreatic fluid collection. VS over night were stable. Cont to treat nausea with IV Zofran, and pain with PO dilaudid q3. Restarting on IV NS fluids for treatment of chronic pancreatitis     Review of Systems   Constitutional:  Positive for malaise/fatigue. Negative for chills and fever.   HENT:  Negative for congestion and sore throat.    Eyes:  Negative for blurred vision.   Respiratory:  Positive for cough and shortness of breath. Negative for hemoptysis and sputum production.    Cardiovascular:  Negative for chest pain and palpitations.   Gastrointestinal:  Positive for abdominal pain, heartburn, nausea and vomiting. Negative for blood in stool, constipation and diarrhea.   Musculoskeletal:  Positive for back pain. Negative for myalgias.   Skin:  Negative for rash.   Neurological:  Negative for dizziness, tingling, sensory change, focal weakness and headaches.   Endo/Heme/Allergies:  Does not bruise/bleed easily.   Psychiatric/Behavioral:  Negative for depression. The patient is nervous/anxious.       Please refer to the H&P for past medical, family, and social history.    OBJECTIVE:     Vital Signs Recent:  Temp: 98.6 °F (37 °C) (10/14/23 1140)  Pulse: 84 (10/14/23 1140)  Resp: 18 (10/14/23 1140)  BP: 116/75 (10/14/23 1140)  SpO2: (!) 94 % (10/14/23 1140)  Oxygen Documentation:                 Device (Oxygen Therapy): room air         Vital Signs Range (Last 24H):  Temp:  [98.6 °F (37 °C)-99.2 °F (37.3 °C)]   Pulse:  [78-93]   Resp:  [16-19]   BP: (116-131)/(67-78)   SpO2:  [94 %-95 %]        I & O (Last 24H):No intake or output data in the 24 hours ending 10/14/23 1158     Physical Exam  Constitutional:       General: He is not in acute distress.     Appearance: Normal appearance. He is ill-appearing.   HENT:      Head: Normocephalic and atraumatic.      Nose: Nose normal. No congestion.      Mouth/Throat:      Mouth: Mucous membranes are moist.      Pharynx: Oropharynx is clear. No posterior oropharyngeal erythema.   Eyes:      Pupils: Pupils are equal, round, and reactive to light.   Cardiovascular:      Rate and Rhythm: Regular rhythm. Tachycardia present.      Pulses: Normal pulses.      Heart sounds: No murmur heard.     No friction rub. No gallop.   Pulmonary:      Effort: No respiratory distress.      Breath sounds: Normal breath sounds. No stridor. No wheezing.   Abdominal:      General: Abdomen is flat.      Palpations: Abdomen is soft.      Tenderness: There is abdominal tenderness. There is guarding.   Musculoskeletal:         General: No swelling, tenderness, deformity or signs of injury.   Skin:     General: Skin is warm and dry.      Coloration: Skin is not jaundiced.   Neurological:      General: No focal deficit present.      Mental Status: He is alert and oriented to person, place, and time.   Psychiatric:         Mood and Affect: Mood normal.         Thought Content: Thought content normal.     Labs:   Recent Labs   Lab 10/12/23  1146 10/13/23  0539 10/14/23  0301    136 137   K 3.6 3.6 4.1    105 104   CO2 20* 21* 21*   BUN 5* 4* 4*   CREATININE 0.7 0.6 0.6    91 104   CALCIUM 9.5 8.5* 8.5*   MG  --  1.7 1.7   PHOS  --  4.7* 4.5     Recent Labs   Lab 10/12/23  1146 10/13/23  0539 10/14/23  0301   ALKPHOS 81 64 62   ALT 26 16 14   AST 42* 20 20   ALBUMIN 3.5 2.6*  2.6*   PROT 8.1 5.9* 5.9*   BILITOT 0.3 0.4 0.3     Recent Labs   Lab 10/12/23  1146 10/12/23  1155 10/13/23  0539 10/14/23  0302   WBC 13.01*  --  6.13 5.00   HGB 12.0*  --  9.4* 9.7*   HCT 38.5* 38 30.7* 31.2*   *  --  306 280     Diagnostic Results:  None to report in the past 24 hours    Scheduled Meds:   alteplase  2 mg Intra-Catheter Once    apixaban  10 mg Oral BID    Followed by    [START ON 10/20/2023] apixaban  5 mg Oral BID    upxwimszj-tzublgya-ghqmyhw ala  1 tablet Oral Daily    ceFEPime (MAXIPIME) IVPB  2 g Intravenous Q8H    folic acid  1 mg Oral Daily    pantoprazole  40 mg Oral BID     Continuous Infusions:   lactated ringers 150 mL/hr at 10/14/23 0924     PRN Meds:acetaminophen, dextrose 10%, dextrose 10%, glucagon (human recombinant), glucose, glucose, HYDROmorphone, melatonin, naloxone, ondansetron, prochlorperazine, sodium chloride 0.9%, sodium chloride 0.9%    ASSESSMENT/PLAN:   Mr. Tasia Cardona is a 23 y.o. male with a relevant medical history of HIV infection, ETOH abuse, acute necrotizing pancreatitis, splenic vein thrombosis asthma and anemia  who is being followed up for Chronic pancreatitis.    Active Hospital Problems    Diagnosis  POA    *Chronic pancreatitis [K86.1]  Yes    SIRS (systemic inflammatory response syndrome) [R65.10]  Yes    Splenic vein thrombosis [I82.890]  Yes    Mild intermittent asthma without complication [J45.20]  Yes     Chronic    Corinne-Chauhan tear [K22.6]  Yes    Polycythemia vera [D45]  Yes    HIV infection [B20]  Yes      Resolved Hospital Problems   No resolved problems to display.     Chronic pancreatitis  ASSESSMENT: 23M PMH HIV (on Biktarvy, last CD4 296 from 9/20/23), EtOH use, acute necrotizing pancreatitis, splenic vein thrombosis, asthma, and anemia who presents to ED with abdominal pain, nausea, and vomiting. Recent discharge on 10/10 after admission for acute necrotizing pancreatitis. Interval increase WBC and lipase. Imaging completed on  this admission without interval changes to collection size or air suggesting abscess. AES consulted on previous admission, who deferred against tapping collection due to immaturity. ID consulted for antibiotic coverage on previous admission, and patient was discharged with cefepime after midline placement  PLAN:  -IV NS fluids restarted  -Pain control  -advance to low fat solid diet  -Cont pantoprazole table 40 mg BID  -cont cefepime 2 g in dextrose 5% in water 100 mL IVPB      2. Splenic Vein Thrombosis  Plan:  -apixaban tablet 10 mg and 5 mg PO daily     3. HIV  ASSESSMENT: pt has a previous diagnosis of HIV which is currently being treated. Last CD4 count was 296 on 09/20/23   PLAN:  -cont uvgcuqwfo-oqnawwmy-ehjceed ala -25 mg (25 kg or greater) 1 tablet daily     4. Mild intermittent asthma without complication  ASSESSMENT: pt has chronic asthma  PLAN:  -cont home regimen PRN  -observe for exacerbation      5. DVT Prophylaxis: compression devise until discontinued     Discharge planning:   Estimated Date of Discharge: Unknown  Code Status: Full Code  Discharge is contingent on patient. Patients VS must be stable, pt must remain afebrile for 24 hrs, pt must be adequately ambulatory     GAVIN Ferraro IV-LA, Medical student

## 2023-10-14 NOTE — SUBJECTIVE & OBJECTIVE
Interval History: Patient with continued generalized abdominal pain, though most pronounced in the epigastric area and has asked for PRN pain meds every 2H. Remains nuaseated that improves with zofran. Has been able to tolerate his CLD without emesis today. Attempting to advance diet    Review of Systems  Objective:     Vital Signs (Most Recent):  Temp: 98.6 °F (37 °C) (10/14/23 1140)  Pulse: 84 (10/14/23 1140)  Resp: 18 (10/14/23 1220)  BP: 116/75 (10/14/23 1140)  SpO2: (!) 94 % (10/14/23 1140) Vital Signs (24h Range):  Temp:  [98.6 °F (37 °C)-99.2 °F (37.3 °C)] 98.6 °F (37 °C)  Pulse:  [78-93] 84  Resp:  [16-19] 18  SpO2:  [94 %-95 %] 94 %  BP: (116-131)/(67-78) 116/75     Weight: 70.8 kg (156 lb)  Body mass index is 22.38 kg/m².  No intake or output data in the 24 hours ending 10/14/23 1356      Physical Exam  Constitutional:       Appearance: He is ill-appearing.   HENT:      Head: Normocephalic and atraumatic.      Nose: No congestion or rhinorrhea.      Mouth/Throat:      Mouth: Mucous membranes are moist.      Pharynx: Oropharynx is clear.   Eyes:      General: No scleral icterus.     Extraocular Movements: Extraocular movements intact.   Cardiovascular:      Rate and Rhythm: Normal rate and regular rhythm.      Heart sounds: No murmur heard.  Pulmonary:      Effort: Pulmonary effort is normal. No respiratory distress.   Abdominal:      General: Bowel sounds are normal. There is no distension.      Tenderness: There is abdominal tenderness.   Musculoskeletal:      Right lower leg: No edema.      Left lower leg: No edema.   Skin:     General: Skin is warm and dry.   Neurological:      General: No focal deficit present.      Mental Status: He is alert and oriented to person, place, and time.   Psychiatric:         Mood and Affect: Mood normal.         Behavior: Behavior normal.             Significant Labs: All pertinent labs within the past 24 hours have been reviewed.    Significant Imaging: I have reviewed  all pertinent imaging results/findings within the past 24 hours.

## 2023-10-14 NOTE — HOSPITAL COURSE
23 yom with Hiv (CD4 count 1000), ETOH abuse, recent acute necrotizing pancreatitis was D/C with midline on cefepime presenting with recurrence of symptoms. CT abdomen demonstrated continued inflammation of the pancreas and similar size cyst without concern for abscess at this time, with mild increase in lipase. Pt has been on dilaudid and taking it whenever possible. Fluids continued. GI consulted on admission; deferred acute need for procedure to drain cyst. Attempting to advance diet. Started on bowel regimen given high frequency of dilaudid with bowel movement on 10/16. Regular diet initiated on 10/17 and dilaudid decreased to 1 mg Q3H PRN. Dilaudid continually weaned down. Discharge regimen for chronic pain 2/2 pancreatitis will consists of MS contin 15 mg BID x 2w, then daily x 2w with 15 mg immediate release morphine Q4H for breakthrough pain for 2w. Will continue infusions as outpatient

## 2023-10-14 NOTE — NURSING
Nurses Note -- 4 Eyes      10/12/2023   11:50 PM      Skin assessed during: Admit      [x] No Altered Skin Integrity Present    [x]Prevention Measures Documented      [] Yes- Altered Skin Integrity Present or Discovered   [] LDA Added if Not in Epic (Describe Wound)   [] New Altered Skin Integrity was Present on Admit and Documented in LDA   [] Wound Image Taken    Wound Care Consulted? No    Attending Nurse:  Jordy Keller RN    Second RN/Staff Member:   Qiana Briscoe RN

## 2023-10-14 NOTE — PLAN OF CARE
AAOX4. Diet modified to Dysphagia Mechanical Soft. PRN medication given for pain. IV ABX. Continuous IV fluids discontinued. Pt. is requesting PRN stool softener and/or laxative medication - Last MB was 10/12. No adverse events noted during this shift.    Problem: Adult Inpatient Plan of Care  Goal: Plan of Care Review  Outcome: Ongoing, Progressing  Flowsheets (Taken 10/14/2023 0527)  Plan of Care Reviewed With: patient  Goal: Absence of Hospital-Acquired Illness or Injury  Outcome: Ongoing, Progressing  Intervention: Prevent Skin Injury  Flowsheets (Taken 10/14/2023 0527)  Body Position:   position changed independently   supine  Skin Protection:   adhesive use limited   incontinence pads utilized   transparent dressing maintained   tubing/devices free from skin contact  Goal: Optimal Comfort and Wellbeing  Outcome: Ongoing, Progressing  Intervention: Provide Person-Centered Care  Flowsheets (Taken 10/14/2023 0527)  Trust Relationship/Rapport:   care explained   choices provided   questions answered   questions encouraged   thoughts/feelings acknowledged   empathic listening provided

## 2023-10-14 NOTE — PROGRESS NOTES
[facility] - Int[facility] ([address] Medicine  Progress Note    Patient Name: Tasia Cardona  MRN: [id number]  Patient Class: IP- Inpatient   Admission Date: 10/12/2023  Length of Stay: 1 days  Attending Physician: Osmin Dutta MD  Primary Care Provider: Pina Jones NP        Subjective:     Principal Problem:Chronic pancreatitis        HPI:  23M PMH HIV (on Biktarvy, last CD4 296 from 9/20/23), EtOH use, acute necrotizing pancreatitis, splenic vein thrombosis, asthma, and anemia who presents to ED with abdominal pain, nausea, and vomiting. He was recently discharged on 10/10 after admission for acute necrotizing pancreatitis. His abdominal pain never fully resolved following discharge, and he was sent home with Dilaudid PO 2 mg PRN. Patient woke up early this morning with severe, generalized abdominal pain with radiation to scapula, nausea, and blood-tinged emesis after arriving to the ED.He denies any recent EtOH use since discharge.     He was evaluated by AES on previous admission, who recommended against any intervention on the pancreatic collection due to immaturity. He was treated with cefepime (as recommended by ID), which was continued after discharge via midline and patient reports compliance. Recent endoscopy (9/18/23) notable for a small Corinne-Chauhan tear and gastric erythema.     In ED, /91, tachycardic (128), and afebrile. CBC notable for slight leukocytosis (13.01 - increased since discharge) and CMP unremarkable. EKG w/ sinus tachycardia. CXR remarkable for small L pleural effusion. CT A/P similar to prior with lobulated peripancreatic fluid collection and thrombus within splenic vein but patent.        Overview/Hospital Course:  23 yom with Hiv (CD4 count 1000), ETOH abuse, recent acute necrotizing pancreatitis was D/C with midline on cefepime presenting with recurrence of symptoms. CT abdomen demonstrated continued inflammation of the pancreas and similar size  cyst without concern for abscess at this time, with mild increase in lipase. Pt has been on dilaudid and taking it whenever possible. Pt placed on fluids. GI consulted and felt there was not a need for procedure to drain cyst. Pt's midline is clogged, attempting to unclog. Attempting to advance diet      Interval History: Patient with continued generalized abdominal pain, though most pronounced in the epigastric area and has asked for PRN pain meds every 2H. Remains nuaseated that improves with zofran. Has been able to tolerate his CLD without emesis today. Attempting to advance diet    Review of Systems  Objective:     Vital Signs (Most Recent):  Temp: 98.6 °F (37 °C) (10/14/23 1140)  Pulse: 84 (10/14/23 1140)  Resp: 18 (10/14/23 1220)  BP: 116/75 (10/14/23 1140)  SpO2: (!) 94 % (10/14/23 1140) Vital Signs (24h Range):  Temp:  [98.6 °F (37 °C)-99.2 °F (37.3 °C)] 98.6 °F (37 °C)  Pulse:  [78-93] 84  Resp:  [16-19] 18  SpO2:  [94 %-95 %] 94 %  BP: (116-131)/(67-78) 116/75     Weight: 70.8 kg (156 lb)  Body mass index is 22.38 kg/m².  No intake or output data in the 24 hours ending 10/14/23 1356      Physical Exam  Constitutional:       Appearance: He is ill-appearing.   HENT:      Head: Normocephalic and atraumatic.      Nose: No congestion or rhinorrhea.      Mouth/Throat:      Mouth: Mucous membranes are moist.      Pharynx: Oropharynx is clear.   Eyes:      General: No scleral icterus.     Extraocular Movements: Extraocular movements intact.   Cardiovascular:      Rate and Rhythm: Normal rate and regular rhythm.      Heart sounds: No murmur heard.  Pulmonary:      Effort: Pulmonary effort is normal. No respiratory distress.   Abdominal:      General: Bowel sounds are normal. There is no distension.      Tenderness: There is abdominal tenderness.   Musculoskeletal:      Right lower leg: No edema.      Left lower leg: No edema.   Skin:     General: Skin is warm and dry.   Neurological:      General: No focal deficit  present.      Mental Status: He is alert and oriented to person, place, and time.   Psychiatric:         Mood and Affect: Mood normal.         Behavior: Behavior normal.             Significant Labs: All pertinent labs within the past 24 hours have been reviewed.    Significant Imaging: I have reviewed all pertinent imaging results/findings within the past 24 hours.      Assessment/Plan:      * Chronic pancreatitis  23M PMH HIV (on Biktarvy, last CD4 296 from 9/20/23), EtOH use, acute necrotizing pancreatitis, splenic vein thrombosis, asthma, and anemia who presents to ED with abdominal pain, nausea, and vomiting. Recent discharge on 10/10 after admission for acute necrotizing pancreatitis. Interval increase WBC and lipase. Imaging completed on this admission without interval changes to collection size or air suggesting abscess. [facility] consulted on this admission, who deferred against tapping collection due to immaturity. ID consulted for antibiotic coverage on previous admission, and patient was discharged with cefepime after midline placement.    - Continue Cefepime  - AES consulted, no intervention planned  - advancing diet as tolerated  - Pain control, nausea meds          SIRS (systemic inflammatory response syndrome)  - See A/P for chronic pancreatitis      Splenic vein thrombosis  Heme/onc consulted on previous admission after diagnosis of partially occlusive splenic vein thrombosis. Anticoagulation was deferred at that time, pending repeat imaging. On CT this admission, there is continued apparent thrombosis.  - Start Eliquis loading dose      Corinne-Chauhan tear  Per ED chart review, patient with blood-tinged emesis. Patient denies recent vomiting since discharge on 10/10. EGD from Sep 2023 notable for gastric erythema and small MW tear.     - Continue PPI   - monitor for signs of hematemesis, melena as starting Eliquis 10/13      Mild intermittent asthma without complication  History of asthma requiring PRN  "albuterol. Has not recently been using any inhaler treatment.       Polycythemia vera  History of PV requiring therapeutic phlebotomy. Blood counts within appropriate range at this time.  - Continue to monitor daily CBC      HIV infection  This patient in known to have HIV+ status (Have detected HIV PCR but never CD4 <200 or AIDS defining illness). Labs reviewed- No results found for: "CD4", No results found for: "HIVDNAPCR". Patient is on HAART. Will continue. Continue to monitor routine labs.   Lab Results   Component Value Date    ABSOLUTECD4 1080 10/12/2023       - Continue home Biktarvy  - We will not consult Infectious disease at this time. Other HIV-associated diseases are not present.    CD4 count is in a very good range      VTE Risk Mitigation (From admission, onward)         Ordered     apixaban tablet 5 mg  2 times daily        See Hyperspace for full Linked Orders Report.    10/13/23 1036     apixaban tablet 10 mg  2 times daily        See Hyperspace for full Linked Orders Report.    10/13/23 1036     IP VTE HIGH RISK PATIENT  Once         10/12/23 1713     Place sequential compression device  Until discontinued         10/12/23 1713     Reason for No Pharmacological VTE Prophylaxis  Once        Question:  Reasons:  Answer:  Risk of Bleeding    10/12/23 1713     Place sequential compression device  Until discontinued         10/12/23 1508                Discharge Planning   CASTILLO: 10/15/2023     Code Status: Full Code   Is the patient medically ready for discharge?: No    Reason for patient still in hospital (select all that apply): Patient trending condition  Discharge Plan A: Home                  Kg Sampson DO  Department of Hospital Medicine   [facility] - Intensive Care ([place] [address])    "

## 2023-10-15 LAB
ALBUMIN SERPL BCP-MCNC: 2.5 G/DL (ref 3.5–5.2)
ALP SERPL-CCNC: 61 U/L (ref 55–135)
ALT SERPL W/O P-5'-P-CCNC: 15 U/L (ref 10–44)
ANION GAP SERPL CALC-SCNC: 6 MMOL/L (ref 8–16)
AST SERPL-CCNC: 26 U/L (ref 10–40)
BASOPHILS # BLD AUTO: 0.03 K/UL (ref 0–0.2)
BASOPHILS NFR BLD: 0.7 % (ref 0–1.9)
BILIRUB SERPL-MCNC: 0.2 MG/DL (ref 0.1–1)
BUN SERPL-MCNC: 4 MG/DL (ref 6–20)
CALCIUM SERPL-MCNC: 8.3 MG/DL (ref 8.7–10.5)
CHLORIDE SERPL-SCNC: 106 MMOL/L (ref 95–110)
CO2 SERPL-SCNC: 26 MMOL/L (ref 23–29)
CREAT SERPL-MCNC: 0.6 MG/DL (ref 0.5–1.4)
DIFFERENTIAL METHOD: ABNORMAL
EOSINOPHIL # BLD AUTO: 0.4 K/UL (ref 0–0.5)
EOSINOPHIL NFR BLD: 9.6 % (ref 0–8)
ERYTHROCYTE [DISTWIDTH] IN BLOOD BY AUTOMATED COUNT: 18.6 % (ref 11.5–14.5)
EST. GFR  (NO RACE VARIABLE): >60 ML/MIN/1.73 M^2
GLUCOSE SERPL-MCNC: 103 MG/DL (ref 70–110)
HCT VFR BLD AUTO: 30.4 % (ref 40–54)
HGB BLD-MCNC: 9.3 G/DL (ref 14–18)
IMM GRANULOCYTES # BLD AUTO: 0.01 K/UL (ref 0–0.04)
IMM GRANULOCYTES NFR BLD AUTO: 0.2 % (ref 0–0.5)
LYMPHOCYTES # BLD AUTO: 1.5 K/UL (ref 1–4.8)
LYMPHOCYTES NFR BLD: 34.2 % (ref 18–48)
MAGNESIUM SERPL-MCNC: 1.8 MG/DL (ref 1.6–2.6)
MCH RBC QN AUTO: 28 PG (ref 27–31)
MCHC RBC AUTO-ENTMCNC: 30.6 G/DL (ref 32–36)
MCV RBC AUTO: 92 FL (ref 82–98)
MONOCYTES # BLD AUTO: 0.5 K/UL (ref 0.3–1)
MONOCYTES NFR BLD: 10 % (ref 4–15)
NEUTROPHILS # BLD AUTO: 2 K/UL (ref 1.8–7.7)
NEUTROPHILS NFR BLD: 45.3 % (ref 38–73)
NRBC BLD-RTO: 0 /100 WBC
PHOSPHATE SERPL-MCNC: 4.8 MG/DL (ref 2.7–4.5)
PLATELET # BLD AUTO: 299 K/UL (ref 150–450)
PMV BLD AUTO: 10.5 FL (ref 9.2–12.9)
POTASSIUM SERPL-SCNC: 4 MMOL/L (ref 3.5–5.1)
PROT SERPL-MCNC: 6 G/DL (ref 6–8.4)
RBC # BLD AUTO: 3.32 M/UL (ref 4.6–6.2)
SODIUM SERPL-SCNC: 138 MMOL/L (ref 136–145)
WBC # BLD AUTO: 4.5 K/UL (ref 3.9–12.7)

## 2023-10-15 PROCEDURE — 85025 COMPLETE CBC W/AUTO DIFF WBC: CPT

## 2023-10-15 PROCEDURE — 99233 PR SUBSEQUENT HOSPITAL CARE,LEVL III: ICD-10-PCS | Mod: ,,, | Performed by: HOSPITALIST

## 2023-10-15 PROCEDURE — 99233 SBSQ HOSP IP/OBS HIGH 50: CPT | Mod: ,,, | Performed by: HOSPITALIST

## 2023-10-15 PROCEDURE — 25000003 PHARM REV CODE 250

## 2023-10-15 PROCEDURE — 84100 ASSAY OF PHOSPHORUS: CPT

## 2023-10-15 PROCEDURE — 63600175 PHARM REV CODE 636 W HCPCS

## 2023-10-15 PROCEDURE — 12000002 HC ACUTE/MED SURGE SEMI-PRIVATE ROOM

## 2023-10-15 PROCEDURE — 94761 N-INVAS EAR/PLS OXIMETRY MLT: CPT

## 2023-10-15 PROCEDURE — 80053 COMPREHEN METABOLIC PANEL: CPT

## 2023-10-15 PROCEDURE — 36415 COLL VENOUS BLD VENIPUNCTURE: CPT

## 2023-10-15 PROCEDURE — 83735 ASSAY OF MAGNESIUM: CPT

## 2023-10-15 RX ORDER — SODIUM CHLORIDE, SODIUM LACTATE, POTASSIUM CHLORIDE, CALCIUM CHLORIDE 600; 310; 30; 20 MG/100ML; MG/100ML; MG/100ML; MG/100ML
INJECTION, SOLUTION INTRAVENOUS CONTINUOUS
Status: DISCONTINUED | OUTPATIENT
Start: 2023-10-15 | End: 2023-10-15

## 2023-10-15 RX ORDER — LACTULOSE 10 G/15ML
20 SOLUTION ORAL 2 TIMES DAILY
Status: DISCONTINUED | OUTPATIENT
Start: 2023-10-15 | End: 2023-10-16

## 2023-10-15 RX ORDER — AMOXICILLIN 250 MG
1 CAPSULE ORAL 2 TIMES DAILY
Status: DISCONTINUED | OUTPATIENT
Start: 2023-10-15 | End: 2023-10-19 | Stop reason: HOSPADM

## 2023-10-15 RX ADMIN — HYDROMORPHONE HYDROCHLORIDE 2 MG: 1 INJECTION, SOLUTION INTRAMUSCULAR; INTRAVENOUS; SUBCUTANEOUS at 06:10

## 2023-10-15 RX ADMIN — CEFEPIME 2 G: 2 INJECTION, POWDER, FOR SOLUTION INTRAVENOUS at 09:10

## 2023-10-15 RX ADMIN — Medication 6 MG: at 09:10

## 2023-10-15 RX ADMIN — CEFEPIME 2 G: 2 INJECTION, POWDER, FOR SOLUTION INTRAVENOUS at 03:10

## 2023-10-15 RX ADMIN — APIXABAN 10 MG: 5 TABLET, FILM COATED ORAL at 08:10

## 2023-10-15 RX ADMIN — HYDROMORPHONE HYDROCHLORIDE 2 MG: 1 INJECTION, SOLUTION INTRAMUSCULAR; INTRAVENOUS; SUBCUTANEOUS at 07:10

## 2023-10-15 RX ADMIN — LACTULOSE 20 G: 20 SOLUTION ORAL at 08:10

## 2023-10-15 RX ADMIN — SENNOSIDES AND DOCUSATE SODIUM 1 TABLET: 50; 8.6 TABLET ORAL at 08:10

## 2023-10-15 RX ADMIN — PANTOPRAZOLE SODIUM 40 MG: 40 TABLET, DELAYED RELEASE ORAL at 08:10

## 2023-10-15 RX ADMIN — ONDANSETRON 4 MG: 2 INJECTION INTRAMUSCULAR; INTRAVENOUS at 08:10

## 2023-10-15 RX ADMIN — SODIUM CHLORIDE, POTASSIUM CHLORIDE, SODIUM LACTATE AND CALCIUM CHLORIDE: 600; 310; 30; 20 INJECTION, SOLUTION INTRAVENOUS at 08:10

## 2023-10-15 RX ADMIN — FOLIC ACID 1 MG: 1 TABLET ORAL at 08:10

## 2023-10-15 RX ADMIN — CEFEPIME 2 G: 2 INJECTION, POWDER, FOR SOLUTION INTRAVENOUS at 01:10

## 2023-10-15 RX ADMIN — SODIUM CHLORIDE, POTASSIUM CHLORIDE, SODIUM LACTATE AND CALCIUM CHLORIDE: 600; 310; 30; 20 INJECTION, SOLUTION INTRAVENOUS at 02:10

## 2023-10-15 RX ADMIN — LACTULOSE 20 G: 20 SOLUTION ORAL at 01:10

## 2023-10-15 RX ADMIN — BICTEGRAVIR SODIUM, EMTRICITABINE, AND TENOFOVIR ALAFENAMIDE FUMARATE 1 TABLET: 50; 200; 25 TABLET ORAL at 08:10

## 2023-10-15 RX ADMIN — PROCHLORPERAZINE EDISYLATE 5 MG: 5 INJECTION INTRAMUSCULAR; INTRAVENOUS at 03:10

## 2023-10-15 RX ADMIN — HYDROMORPHONE HYDROCHLORIDE 2 MG: 1 INJECTION, SOLUTION INTRAMUSCULAR; INTRAVENOUS; SUBCUTANEOUS at 09:10

## 2023-10-15 RX ADMIN — ONDANSETRON 4 MG: 2 INJECTION INTRAMUSCULAR; INTRAVENOUS at 06:10

## 2023-10-15 RX ADMIN — HYDROMORPHONE HYDROCHLORIDE 2 MG: 1 INJECTION, SOLUTION INTRAMUSCULAR; INTRAVENOUS; SUBCUTANEOUS at 01:10

## 2023-10-15 RX ADMIN — HYDROMORPHONE HYDROCHLORIDE 2 MG: 1 INJECTION, SOLUTION INTRAMUSCULAR; INTRAVENOUS; SUBCUTANEOUS at 10:10

## 2023-10-15 RX ADMIN — HYDROMORPHONE HYDROCHLORIDE 2 MG: 1 INJECTION, SOLUTION INTRAMUSCULAR; INTRAVENOUS; SUBCUTANEOUS at 12:10

## 2023-10-15 RX ADMIN — HYDROMORPHONE HYDROCHLORIDE 2 MG: 1 INJECTION, SOLUTION INTRAMUSCULAR; INTRAVENOUS; SUBCUTANEOUS at 03:10

## 2023-10-15 NOTE — PLAN OF CARE
Problem: Adult Inpatient Plan of Care  Goal: Plan of Care Review  Outcome: Ongoing, Not Progressing  Goal: Optimal Comfort and Wellbeing  Outcome: Ongoing, Not Progressing     Problem: Infection  Goal: Absence of Infection Signs and Symptoms  Outcome: Ongoing, Not Progressing     AAOx4. Started on clear liquid diet. LR in progress at 100 ml/hr. Continues with iv abx and lactulose; no bm reported during shift.

## 2023-10-15 NOTE — PROGRESS NOTES
[facility] - [facility] ([address] Medicine  Progress Note    Patient Name: Tasia Cardona  MRN: [id number]  Patient Class: IP- Inpatient   Admission Date: 10/12/2023  Length of Stay: 2 days  Attending Physician: Osmin Dutta MD  Primary Care Provider: Pina Jones NP        Subjective:     Principal Problem:Chronic pancreatitis        HPI:  23M PMH HIV (on Biktarvy, last CD4 296 from 9/20/23), EtOH use, acute necrotizing pancreatitis, splenic vein thrombosis, asthma, and anemia who presents to ED with abdominal pain, nausea, and vomiting. He was recently discharged on 10/10 after admission for acute necrotizing pancreatitis. His abdominal pain never fully resolved following discharge, and he was sent home with Dilaudid PO 2 mg PRN. Patient woke up early this morning with severe, generalized abdominal pain with radiation to scapula, nausea, and blood-tinged emesis after arriving to the ED.He denies any recent EtOH use since discharge.     He was evaluated by [facility] on previous admission, who recommended against any intervention on the pancreatic collection due to immaturity. He was treated with cefepime (as recommended by ID), which was continued after discharge via midline and patient reports compliance. Recent endoscopy (9/18/23) notable for a small Corinne-Chauahn tear and gastric erythema.     In ED, /91, tachycardic (128), and afebrile. CBC notable for slight leukocytosis (13.01 - increased since discharge) and CMP unremarkable. EKG w/ sinus tachycardia. CXR remarkable for small L pleural effusion. CT A/P similar to prior with lobulated peripancreatic fluid collection and thrombus within splenic vein but patent.        Overview/Hospital Course:  23 yom with Hiv (CD4 count 1000), ETOH abuse, recent acute necrotizing pancreatitis was D/C with midline on cefepime presenting with recurrence of symptoms. CT abdomen demonstrated continued inflammation of the pancreas and similar size  cyst without concern for abscess at this time, with mild increase in lipase. Pt has been on dilaudid and taking it whenever possible. Pt placed on fluids. GI consulted and felt there was not a need for procedure to drain cyst. Pt's midline is clogged, attempting to unclog. Attempting to advance diet      Interval History: Patient with continued generalized abdominal pain, though most pronounced in the epigastric area and continues to request PRN pain meds every 2H in addition to nausea that resolves with zofran. Has otherwise been tolerating a regular diet without emesis. New dry cough today as well as intermittent sweats. No bowel movement since admission.       Review of Systems   Constitutional:  Positive for chills and diaphoresis. Negative for fever.   Respiratory:  Positive for cough. Negative for shortness of breath and wheezing.    Cardiovascular:  Negative for chest pain, palpitations and leg swelling.   Gastrointestinal:  Positive for abdominal pain, constipation and nausea. Negative for blood in stool, diarrhea and vomiting.   Genitourinary:  Negative for difficulty urinating and dysuria.   Musculoskeletal:  Negative for arthralgias and myalgias.   Skin:  Negative for color change.   Neurological:  Negative for dizziness and headaches.     Objective:     Vital Signs (Most Recent):  Temp: 98.6 °F (37 °C) (10/15/23 0734)  Pulse: 83 (10/15/23 0734)  Resp: 18 (10/15/23 0734)  BP: 116/70 (10/15/23 0734)  SpO2: (!) 93 % (10/15/23 0734) Vital Signs (24h Range):  Temp:  [98.1 °F (36.7 °C)-99.1 °F (37.3 °C)] 98.6 °F (37 °C)  Pulse:  [75-93] 83  Resp:  [16-18] 18  SpO2:  [93 %-94 %] 93 %  BP: (112-121)/(65-76) 116/70     Weight: 70.8 kg (156 lb)  Body mass index is 22.38 kg/m².    Intake/Output Summary (Last 24 hours) at 10/15/2023 0803  Last data filed at 10/15/2023 0401  Gross per 24 hour   Intake 200 ml   Output --   Net 200 ml         Physical Exam  Constitutional:       Appearance: He is ill-appearing.   HENT:       Head: Normocephalic and atraumatic.      Nose: No congestion or rhinorrhea.      Mouth/Throat:      Mouth: Mucous membranes are moist.      Pharynx: Oropharynx is clear.   Eyes:      General: No scleral icterus.     Extraocular Movements: Extraocular movements intact.   Cardiovascular:      Rate and Rhythm: Normal rate and regular rhythm.      Heart sounds: No murmur heard.  Pulmonary:      Effort: Pulmonary effort is normal. No respiratory distress.   Abdominal:      General: Bowel sounds are normal. There is no distension.      Tenderness: There is abdominal tenderness.   Musculoskeletal:      Right lower leg: No edema.      Left lower leg: No edema.   Skin:     General: Skin is warm and dry.   Neurological:      General: No focal deficit present.      Mental Status: He is alert and oriented to person, place, and time.   Psychiatric:         Mood and Affect: Mood normal.         Behavior: Behavior normal.             Significant Labs: All pertinent labs within the past 24 hours have been reviewed.    Significant Imaging: I have reviewed all pertinent imaging results/findings within the past 24 hours.      Assessment/Plan:      * Chronic pancreatitis  23M PMH HIV (on Biktarvy, last CD4 296 from 9/20/23), EtOH use, acute necrotizing pancreatitis, splenic vein thrombosis, asthma, and anemia who presents to ED with abdominal pain, nausea, and vomiting. Recent discharge on 10/10 after admission for acute necrotizing pancreatitis. Interval increase WBC and lipase. Imaging completed on this admission without interval changes to collection size or air suggesting abscess. AES consulted on this admission, who deferred against tapping collection due to immaturity. ID consulted for antibiotic coverage on previous admission, and patient was discharged with cefepime after midline placement.     - Continue Cefepime  - AES consulted, no intervention planned  - advancing diet as tolerated - having nausea with regular diet;  "restart CLD today  - Pain control, nausea meds  - bowel regimen initiated - pericolace BID, + lactulose BID x 1 day          SIRS (systemic inflammatory response syndrome)  - See A/P for chronic pancreatitis      Splenic vein thrombosis  Heme/onc consulted on previous admission after diagnosis of partially occlusive splenic vein thrombosis. Anticoagulation was deferred at that time, pending repeat imaging. On CT this admission, there is continued apparent thrombosis.  - Start Eliquis loading dose      Corinne-Chauhan tear  Per ED chart review, patient with blood-tinged emesis. Patient denies recent vomiting since discharge on 10/10. EGD from Sep 2023 notable for gastric erythema and small MW tear.     - Continue PPI   - monitor for signs of hematemesis, melena as starting Eliquis 10/13      Mild intermittent asthma without complication  History of asthma requiring PRN albuterol. Has not recently been using any inhaler treatment.       Polycythemia vera  History of PV requiring therapeutic phlebotomy. Blood counts within appropriate range at this time.  - Continue to monitor daily CBC      HIV infection  This patient in known to have HIV+ status (Have detected HIV PCR but never CD4 <200 or AIDS defining illness). Labs reviewed- No results found for: "CD4", No results found for: "HIVDNAPCR". Patient is on HAART. Will continue. Continue to monitor routine labs.   Lab Results   Component Value Date    ABSOLUTECD4 1080 10/12/2023       - Continue home Biktarvy  - We will not consult Infectious disease at this time. Other HIV-associated diseases are not present.    CD4 count is in a very good range      VTE Risk Mitigation (From admission, onward)         Ordered     apixaban tablet 5 mg  2 times daily        See Hyperspace for full Linked Orders Report.    10/13/23 1036     apixaban tablet 10 mg  2 times daily        See Hyperspace for full Linked Orders Report.    10/13/23 1036     IP VTE HIGH RISK PATIENT  Once         " 10/12/23 1713     Place sequential compression device  Until discontinued         10/12/23 1713     Reason for No Pharmacological VTE Prophylaxis  Once        Question:  Reasons:  Answer:  Risk of Bleeding    10/12/23 1713     Place sequential compression device  Until discontinued         10/12/23 1508                Discharge Planning   CASTILLO: 10/15/2023     Code Status: Full Code   Is the patient medically ready for discharge?: No    Reason for patient still in hospital (select all that apply): Treatment  Discharge Plan A: Home        Noah Trinh MD  Department of Hospital Medicine   [facility] - Intensive Care ([address]

## 2023-10-15 NOTE — MEDICAL/APP STUDENT
[facility] Medicine Student   Progress Note  [facility]    Admit Date: 10/12/2023  Hospital Day: 2  10/15/2023  8:53 AM    SUBJECTIVE:   Mr. Tasia Cardona is a 23 y.o. male with a relevant medical history of HIV (on Biktarvy, last CD4 296 from 9/20/23), EtOH use, acute necrotizing pancreatitis, splenic vein thrombosis, asthma, and anemia who is being followed up for Chronic pancreatitis.    Interval history: Today the pt was sitting in be comfortably. He reports that his epigastric pain is still at an 8/10 and has not improved. Only relief of his pain is immediately after receiving dilaudid. He has transitioned to eating small amounts of solid food but reports significant nausea when doing so. GI reported that nothing could be done at this time. Overnight VS were stable. He cont to receive PO dilaudid q3 for pain and IV Zofran. Will cont to monitor for worsening symptoms    Review of Systems   Constitutional:  Positive for malaise/fatigue. Negative for chills and fever.   HENT:  Negative for congestion and sore throat.    Eyes:  Negative for blurred vision.   Respiratory:  Positive for cough and shortness of breath. Negative for hemoptysis and sputum production.    Cardiovascular:  Negative for chest pain and palpitations.   Gastrointestinal:  Positive for abdominal pain, heartburn, nausea and vomiting. Negative for blood in stool, constipation and diarrhea.   Musculoskeletal:  Positive for back pain. Negative for myalgias.   Skin:  Negative for rash.   Neurological:  Negative for dizziness, tingling, sensory change, focal weakness and headaches.   Endo/Heme/Allergies:  Does not bruise/bleed easily.   Psychiatric/Behavioral:  Negative for depression. The patient is nervous/anxious.      Please refer to the H&P for past medical, family, and social history.    OBJECTIVE:     Vital Signs Recent:  Temp: 98.6 °F (37 °C) (10/15/23 0734)  Pulse: 83 (10/15/23 0734)  Resp: 18 (10/15/23 0734)  BP: 116/70 (10/15/23  0734)  SpO2: (!) 93 % (10/15/23 0734)  Oxygen Documentation:                Device (Oxygen Therapy): room air         Vital Signs Range (Last 24H):  Temp:  [98.1 °F (36.7 °C)-99.1 °F (37.3 °C)]   Pulse:  [75-93]   Resp:  [16-18]   BP: (112-121)/(65-76)   SpO2:  [93 %-94 %]        I & O (Last 24H):  Intake/Output Summary (Last 24 hours) at 10/15/2023 0853  Last data filed at 10/15/2023 0841  Gross per 24 hour   Intake 440 ml   Output --   Net 440 ml        Physical Exam  Constitutional:       General: He is not in acute distress.     Appearance: Normal appearance. He is ill-appearing.   HENT:      Head: Normocephalic and atraumatic.      Nose: Nose normal. No congestion.      Mouth/Throat:      Mouth: Mucous membranes are moist.      Pharynx: Oropharynx is clear. No posterior oropharyngeal erythema.   Eyes:      Pupils: Pupils are equal, round, and reactive to light.   Cardiovascular:      Rate and Rhythm: Regular rhythm. Tachycardia present.      Pulses: Normal pulses.      Heart sounds: No murmur heard.     No friction rub. No gallop.   Pulmonary:      Effort: No respiratory distress.      Breath sounds: Normal breath sounds. No stridor. No wheezing.   Abdominal:      General: Abdomen is flat.      Palpations: Abdomen is soft.      Tenderness: There is abdominal tenderness. There is guarding.   Musculoskeletal:         General: No swelling, tenderness, deformity or signs of injury.   Skin:     General: Skin is warm and dry.      Coloration: Skin is not jaundiced.   Neurological:      General: No focal deficit present.      Mental Status: He is alert and oriented to person, place, and time.   Psychiatric:         Mood and Affect: Mood normal.         Thought Content: Thought content normal.     Labs:   Recent Labs   Lab 10/13/23  0539 10/14/23  0301 10/15/23  0454    137 138   K 3.6 4.1 4.0    104 106   CO2 21* 21* 26   BUN 4* 4* 4*   CREATININE 0.6 0.6 0.6   GLU 91 104 103   CALCIUM 8.5* 8.5* 8.3*   MG  1.7 1.7 1.8   PHOS 4.7* 4.5 4.8*     Recent Labs   Lab 10/13/23  0539 10/14/23  0301 10/15/23  0454   ALKPHOS 64 62 61   ALT 16 14 15   AST 20 20 26   ALBUMIN 2.6* 2.6* 2.5*   PROT 5.9* 5.9* 6.0   BILITOT 0.4 0.3 0.2     Recent Labs   Lab 10/13/23  0539 10/14/23  0302 10/15/23  0454   WBC 6.13 5.00 4.50   HGB 9.4* 9.7* 9.3*   HCT 30.7* 31.2* 30.4*    280 299       Diagnostic Results:  No results to report in the past 24 hours    Scheduled Meds:   alteplase  2 mg Intra-Catheter Once    apixaban  10 mg Oral BID    Followed by    [START ON 10/20/2023] apixaban  5 mg Oral BID    ifvpgmexl-gtdltlzu-nltpndy ala  1 tablet Oral Daily    ceFEPime (MAXIPIME) IVPB  2 g Intravenous Q8H    folic acid  1 mg Oral Daily    pantoprazole  40 mg Oral BID    senna-docusate 8.6-50 mg  1 tablet Oral BID     Continuous Infusions:  PRN Meds:acetaminophen, dextrose 10%, dextrose 10%, glucagon (human recombinant), glucose, glucose, HYDROmorphone, melatonin, naloxone, ondansetron, prochlorperazine, sodium chloride 0.9%, sodium chloride 0.9%    ASSESSMENT/PLAN:   Mr. Tasia Cardona is a 23 y.o. male with a relevant medical history of HIV (on Biktarvy, last CD4 296 from 9/20/23), EtOH use, acute necrotizing pancreatitis, splenic vein thrombosis, asthma, and anemia  who is being followed up for Chronic pancreatitis.    Active Hospital Problems    Diagnosis  POA    *Chronic pancreatitis [K86.1]  Yes    SIRS (systemic inflammatory response syndrome) [R65.10]  Yes    Splenic vein thrombosis [I82.890]  Yes    Mild intermittent asthma without complication [J45.20]  Yes     Chronic    Corinne-Chauhan tear [K22.6]  Yes    Polycythemia vera [D45]  Yes    HIV infection [B20]  Yes      Resolved Hospital Problems   No resolved problems to display.     Chronic pancreatitis  ASSESSMENT: 23M PMH HIV (on Biktarvy, last CD4 296 from 9/20/23), EtOH use, acute necrotizing pancreatitis, splenic vein thrombosis, asthma, and anemia who presents to ED with  abdominal pain, nausea, and vomiting. Recent discharge on 10/10 after admission for acute necrotizing pancreatitis. Interval increase WBC and lipase. Imaging completed on this admission without interval changes to collection size or air suggesting abscess. AES consulted on previous admission, who deferred against tapping collection due to immaturity. ID consulted for antibiotic coverage on previous admission, and patient was discharged with cefepime after midline placement  PLAN:  -IV NS fluids restarted  -Pain control  -advance to low fat solid diet  -Cont pantoprazole table 40 mg BID  -cont cefepime 2 g in dextrose 5% in water 100 mL IVPB      2. Splenic Vein Thrombosis  PLAN: heme onc was consulted following report of partially obstructed splenic vein thrombosis. On repeat CT on 10/12/23 splenic vein thrombosis still present.  Plan:  -apixaban tablet 10 mg and 5 mg PO daily    3. Corinne-Chauhan tear  ASSESSMENT: Per ED chart review, patient with blood-tinged emesis. Patient denies recent vomiting since discharge on 10/10. EGD from Sep 2023 notable for gastric erythema and small MW tear.   Plan:   - Continue PPI   - monitor for signs of hematemesis, melena as starting Eliquis 10/13     4. HIV  ASSESSMENT: pt has a previous diagnosis of HIV which is currently being treated. Last CD4 count was 296 on 09/20/23   PLAN:  -cont lyobgnmwz-cyxoiosk-iwyhztx ala -25 mg (25 kg or greater) 1 tablet daily     5. Mild intermittent asthma without complication  ASSESSMENT: pt has chronic asthma  PLAN:  -cont home regimen PRN  -observe for exacerbation      6. DVT Prophylaxis: compression devise until discontinued      Discharge planning:   Estimated Date of Discharge: Unknown  Code Status: Full Code  Discharge is contingent on patient. Patients VS must be stable, pt must remain afebrile for 24 hrs, pt must be adequately ambulatory     GAVIN Ferraro IV-LA, Medical student

## 2023-10-15 NOTE — NURSING
He has been quiet and cooperative. He has received his Dilaudid IV PRN routinely throughout the shift. Pain 7-8/10. Did not ask for zofran thus far. IVF discontinued now. No other s/s distress noted. Vitals are stable.

## 2023-10-15 NOTE — ASSESSMENT & PLAN NOTE
23M PMH HIV (on Biktarvy, last CD4 296 from 9/20/23), EtOH use, acute necrotizing pancreatitis, splenic vein thrombosis, asthma, and anemia who presents to ED with abdominal pain, nausea, and vomiting. Recent discharge on 10/10 after admission for acute necrotizing pancreatitis. Interval increase WBC and lipase. Imaging completed on this admission without interval changes to collection size or air suggesting abscess. AES consulted on this admission, who deferred against tapping collection due to immaturity. ID consulted for antibiotic coverage on previous admission, and patient was discharged with cefepime after midline placement.     - Continue Cefepime  - AES consulted, no intervention planned  - advancing diet as tolerated - having nausea with regular diet; restart CLD today  - Pain control, nausea meds  - bowel regimen initiated - pericolace BID, + lactulose BID x 1 day

## 2023-10-15 NOTE — SUBJECTIVE & OBJECTIVE
Interval History: Patient with continued generalized abdominal pain, though most pronounced in the epigastric area and continues to request PRN pain meds every 2H in addition to nausea that resolves with zofran. Has otherwise been tolerating a regular diet without emesis. New dry cough today as well as intermittent sweats. No bowel movement since admission.       Review of Systems   Constitutional:  Positive for chills and diaphoresis. Negative for fever.   Respiratory:  Positive for cough. Negative for shortness of breath and wheezing.    Cardiovascular:  Negative for chest pain, palpitations and leg swelling.   Gastrointestinal:  Positive for abdominal pain, constipation and nausea. Negative for blood in stool, diarrhea and vomiting.   Genitourinary:  Negative for difficulty urinating and dysuria.   Musculoskeletal:  Negative for arthralgias and myalgias.   Skin:  Negative for color change.   Neurological:  Negative for dizziness and headaches.     Objective:     Vital Signs (Most Recent):  Temp: 98.6 °F (37 °C) (10/15/23 0734)  Pulse: 83 (10/15/23 0734)  Resp: 18 (10/15/23 0734)  BP: 116/70 (10/15/23 0734)  SpO2: (!) 93 % (10/15/23 0734) Vital Signs (24h Range):  Temp:  [98.1 °F (36.7 °C)-99.1 °F (37.3 °C)] 98.6 °F (37 °C)  Pulse:  [75-93] 83  Resp:  [16-18] 18  SpO2:  [93 %-94 %] 93 %  BP: (112-121)/(65-76) 116/70     Weight: 70.8 kg (156 lb)  Body mass index is 22.38 kg/m².    Intake/Output Summary (Last 24 hours) at 10/15/2023 0803  Last data filed at 10/15/2023 0401  Gross per 24 hour   Intake 200 ml   Output --   Net 200 ml         Physical Exam  Constitutional:       Appearance: He is ill-appearing.   HENT:      Head: Normocephalic and atraumatic.      Nose: No congestion or rhinorrhea.      Mouth/Throat:      Mouth: Mucous membranes are moist.      Pharynx: Oropharynx is clear.   Eyes:      General: No scleral icterus.     Extraocular Movements: Extraocular movements intact.   Cardiovascular:      Rate and  Rhythm: Normal rate and regular rhythm.      Heart sounds: No murmur heard.  Pulmonary:      Effort: Pulmonary effort is normal. No respiratory distress.   Abdominal:      General: Bowel sounds are normal. There is no distension.      Tenderness: There is abdominal tenderness.   Musculoskeletal:      Right lower leg: No edema.      Left lower leg: No edema.   Skin:     General: Skin is warm and dry.   Neurological:      General: No focal deficit present.      Mental Status: He is alert and oriented to person, place, and time.   Psychiatric:         Mood and Affect: Mood normal.         Behavior: Behavior normal.             Significant Labs: All pertinent labs within the past 24 hours have been reviewed.    Significant Imaging: I have reviewed all pertinent imaging results/findings within the past 24 hours.

## 2023-10-16 LAB
ALBUMIN SERPL BCP-MCNC: 2.7 G/DL (ref 3.5–5.2)
ALP SERPL-CCNC: 66 U/L (ref 55–135)
ALT SERPL W/O P-5'-P-CCNC: 13 U/L (ref 10–44)
AMPHETAMINES SERPL QL: NEGATIVE
ANION GAP SERPL CALC-SCNC: 12 MMOL/L (ref 8–16)
AST SERPL-CCNC: 17 U/L (ref 10–40)
BARBITURATES SERPL QL SCN: NEGATIVE
BASOPHILS # BLD AUTO: 0.03 K/UL (ref 0–0.2)
BASOPHILS NFR BLD: 0.6 % (ref 0–1.9)
BENZODIAZ SERPL QL SCN: NEGATIVE
BILIRUB SERPL-MCNC: 0.4 MG/DL (ref 0.1–1)
BUN SERPL-MCNC: 3 MG/DL (ref 6–20)
BZE SERPL QL: NEGATIVE
CALCIUM SERPL-MCNC: 9 MG/DL (ref 8.7–10.5)
CARBOXYTHC SERPL QL SCN: POSITIVE
CHLORIDE SERPL-SCNC: 107 MMOL/L (ref 95–110)
CO2 SERPL-SCNC: 22 MMOL/L (ref 23–29)
CREAT SERPL-MCNC: 0.6 MG/DL (ref 0.5–1.4)
DIFFERENTIAL METHOD: ABNORMAL
EOSINOPHIL # BLD AUTO: 0.5 K/UL (ref 0–0.5)
EOSINOPHIL NFR BLD: 9.3 % (ref 0–8)
ERYTHROCYTE [DISTWIDTH] IN BLOOD BY AUTOMATED COUNT: 18.6 % (ref 11.5–14.5)
EST. GFR  (NO RACE VARIABLE): >60 ML/MIN/1.73 M^2
ETHANOL SERPL QL SCN: NEGATIVE
GLUCOSE SERPL-MCNC: 77 MG/DL (ref 70–110)
HCT VFR BLD AUTO: 32 % (ref 40–54)
HGB BLD-MCNC: 10 G/DL (ref 14–18)
IMM GRANULOCYTES # BLD AUTO: 0.01 K/UL (ref 0–0.04)
IMM GRANULOCYTES NFR BLD AUTO: 0.2 % (ref 0–0.5)
LYMPHOCYTES # BLD AUTO: 1.7 K/UL (ref 1–4.8)
LYMPHOCYTES NFR BLD: 33.7 % (ref 18–48)
MAGNESIUM SERPL-MCNC: 1.7 MG/DL (ref 1.6–2.6)
MCH RBC QN AUTO: 29 PG (ref 27–31)
MCHC RBC AUTO-ENTMCNC: 31.3 G/DL (ref 32–36)
MCV RBC AUTO: 93 FL (ref 82–98)
METHADONE SERPL QL SCN: NEGATIVE
MONOCYTES # BLD AUTO: 0.5 K/UL (ref 0.3–1)
MONOCYTES NFR BLD: 9.3 % (ref 4–15)
NEUTROPHILS # BLD AUTO: 2.3 K/UL (ref 1.8–7.7)
NEUTROPHILS NFR BLD: 46.9 % (ref 38–73)
NRBC BLD-RTO: 0 /100 WBC
OPIATES SERPL QL SCN: NEGATIVE
PCP SERPL QL SCN: NEGATIVE
PHOSPHATE SERPL-MCNC: 4.5 MG/DL (ref 2.7–4.5)
PLATELET # BLD AUTO: 307 K/UL (ref 150–450)
PMV BLD AUTO: 10.5 FL (ref 9.2–12.9)
POTASSIUM SERPL-SCNC: 3.6 MMOL/L (ref 3.5–5.1)
PROPOXYPH SERPL QL: NEGATIVE
PROT SERPL-MCNC: 6.2 G/DL (ref 6–8.4)
RBC # BLD AUTO: 3.45 M/UL (ref 4.6–6.2)
SODIUM SERPL-SCNC: 141 MMOL/L (ref 136–145)
WBC # BLD AUTO: 4.93 K/UL (ref 3.9–12.7)

## 2023-10-16 PROCEDURE — 12000002 HC ACUTE/MED SURGE SEMI-PRIVATE ROOM

## 2023-10-16 PROCEDURE — 25000003 PHARM REV CODE 250

## 2023-10-16 PROCEDURE — 36415 COLL VENOUS BLD VENIPUNCTURE: CPT

## 2023-10-16 PROCEDURE — 99233 PR SUBSEQUENT HOSPITAL CARE,LEVL III: ICD-10-PCS | Mod: ,,, | Performed by: HOSPITALIST

## 2023-10-16 PROCEDURE — 99233 SBSQ HOSP IP/OBS HIGH 50: CPT | Mod: ,,, | Performed by: HOSPITALIST

## 2023-10-16 PROCEDURE — 80053 COMPREHEN METABOLIC PANEL: CPT

## 2023-10-16 PROCEDURE — 63600175 PHARM REV CODE 636 W HCPCS

## 2023-10-16 PROCEDURE — 84100 ASSAY OF PHOSPHORUS: CPT

## 2023-10-16 PROCEDURE — 94761 N-INVAS EAR/PLS OXIMETRY MLT: CPT

## 2023-10-16 PROCEDURE — 83735 ASSAY OF MAGNESIUM: CPT

## 2023-10-16 PROCEDURE — 85025 COMPLETE CBC W/AUTO DIFF WBC: CPT

## 2023-10-16 PROCEDURE — 25000003 PHARM REV CODE 250: Performed by: HOSPITALIST

## 2023-10-16 RX ORDER — POLYETHYLENE GLYCOL 3350 17 G/17G
17 POWDER, FOR SOLUTION ORAL 2 TIMES DAILY
Status: DISCONTINUED | OUTPATIENT
Start: 2023-10-16 | End: 2023-10-18

## 2023-10-16 RX ORDER — ACETAMINOPHEN 500 MG
1000 TABLET ORAL EVERY 8 HOURS
Status: DISCONTINUED | OUTPATIENT
Start: 2023-10-16 | End: 2023-10-19 | Stop reason: HOSPADM

## 2023-10-16 RX ORDER — LACTULOSE 10 G/15ML
20 SOLUTION ORAL 3 TIMES DAILY
Status: DISCONTINUED | OUTPATIENT
Start: 2023-10-16 | End: 2023-10-17

## 2023-10-16 RX ORDER — IBUPROFEN 200 MG
400 TABLET ORAL EVERY 6 HOURS
Status: DISCONTINUED | OUTPATIENT
Start: 2023-10-16 | End: 2023-10-19 | Stop reason: HOSPADM

## 2023-10-16 RX ORDER — MAGNESIUM SULFATE HEPTAHYDRATE 40 MG/ML
2 INJECTION, SOLUTION INTRAVENOUS ONCE
Status: COMPLETED | OUTPATIENT
Start: 2023-10-16 | End: 2023-10-16

## 2023-10-16 RX ADMIN — HYDROMORPHONE HYDROCHLORIDE 2 MG: 1 INJECTION, SOLUTION INTRAMUSCULAR; INTRAVENOUS; SUBCUTANEOUS at 06:10

## 2023-10-16 RX ADMIN — APIXABAN 10 MG: 5 TABLET, FILM COATED ORAL at 08:10

## 2023-10-16 RX ADMIN — POTASSIUM BICARBONATE 25 MEQ: 978 TABLET, EFFERVESCENT ORAL at 08:10

## 2023-10-16 RX ADMIN — MAGNESIUM SULFATE HEPTAHYDRATE 2 G: 40 INJECTION, SOLUTION INTRAVENOUS at 08:10

## 2023-10-16 RX ADMIN — HYDROMORPHONE HYDROCHLORIDE 2 MG: 1 INJECTION, SOLUTION INTRAMUSCULAR; INTRAVENOUS; SUBCUTANEOUS at 09:10

## 2023-10-16 RX ADMIN — IBUPROFEN 400 MG: 200 TABLET, FILM COATED ORAL at 05:10

## 2023-10-16 RX ADMIN — LACTULOSE 20 G: 20 SOLUTION ORAL at 04:10

## 2023-10-16 RX ADMIN — CEFEPIME 2 G: 2 INJECTION, POWDER, FOR SOLUTION INTRAVENOUS at 05:10

## 2023-10-16 RX ADMIN — ACETAMINOPHEN 1000 MG: 500 TABLET ORAL at 04:10

## 2023-10-16 RX ADMIN — HYDROMORPHONE HYDROCHLORIDE 2 MG: 1 INJECTION, SOLUTION INTRAMUSCULAR; INTRAVENOUS; SUBCUTANEOUS at 04:10

## 2023-10-16 RX ADMIN — PROCHLORPERAZINE EDISYLATE 5 MG: 5 INJECTION INTRAMUSCULAR; INTRAVENOUS at 05:10

## 2023-10-16 RX ADMIN — CEFEPIME 2 G: 2 INJECTION, POWDER, FOR SOLUTION INTRAVENOUS at 03:10

## 2023-10-16 RX ADMIN — IBUPROFEN 400 MG: 200 TABLET, FILM COATED ORAL at 12:10

## 2023-10-16 RX ADMIN — PANTOPRAZOLE SODIUM 40 MG: 40 TABLET, DELAYED RELEASE ORAL at 09:10

## 2023-10-16 RX ADMIN — POTASSIUM BICARBONATE 25 MEQ: 978 TABLET, EFFERVESCENT ORAL at 10:10

## 2023-10-16 RX ADMIN — BICTEGRAVIR SODIUM, EMTRICITABINE, AND TENOFOVIR ALAFENAMIDE FUMARATE 1 TABLET: 50; 200; 25 TABLET ORAL at 08:10

## 2023-10-16 RX ADMIN — FOLIC ACID 1 MG: 1 TABLET ORAL at 08:10

## 2023-10-16 RX ADMIN — Medication 6 MG: at 10:10

## 2023-10-16 RX ADMIN — HYDROMORPHONE HYDROCHLORIDE 2 MG: 1 INJECTION, SOLUTION INTRAMUSCULAR; INTRAVENOUS; SUBCUTANEOUS at 03:10

## 2023-10-16 RX ADMIN — PROCHLORPERAZINE EDISYLATE 5 MG: 5 INJECTION INTRAMUSCULAR; INTRAVENOUS at 04:10

## 2023-10-16 RX ADMIN — POLYETHYLENE GLYCOL 3350 17 G: 17 POWDER, FOR SOLUTION ORAL at 10:10

## 2023-10-16 RX ADMIN — CEFEPIME 2 G: 2 INJECTION, POWDER, FOR SOLUTION INTRAVENOUS at 09:10

## 2023-10-16 RX ADMIN — LACTULOSE 20 G: 20 SOLUTION ORAL at 08:10

## 2023-10-16 RX ADMIN — HYDROMORPHONE HYDROCHLORIDE 2 MG: 1 INJECTION, SOLUTION INTRAMUSCULAR; INTRAVENOUS; SUBCUTANEOUS at 12:10

## 2023-10-16 RX ADMIN — ACETAMINOPHEN 1000 MG: 500 TABLET ORAL at 09:10

## 2023-10-16 RX ADMIN — APIXABAN 10 MG: 5 TABLET, FILM COATED ORAL at 09:10

## 2023-10-16 RX ADMIN — LACTULOSE 20 G: 20 SOLUTION ORAL at 09:10

## 2023-10-16 RX ADMIN — SENNOSIDES AND DOCUSATE SODIUM 1 TABLET: 50; 8.6 TABLET ORAL at 09:10

## 2023-10-16 RX ADMIN — ONDANSETRON 4 MG: 2 INJECTION INTRAMUSCULAR; INTRAVENOUS at 10:10

## 2023-10-16 NOTE — PLAN OF CARE
Problem: Infection  Goal: Absence of Infection Signs and Symptoms  Outcome: Ongoing, Progressing     Problem: Fall Injury Risk  Goal: Absence of Fall and Fall-Related Injury  Outcome: Ongoing, Progressing   Mag and Potassium given. Pain meds given accordingly. Pt remains on clear liquid diet. PICC dressing changed, next change due 10/26. Bed locked and in lowest position. Call light in reach.

## 2023-10-16 NOTE — SUBJECTIVE & OBJECTIVE
"Interval History: Resting in bed comfortably following breakfast. Alert, interactive, pleasant. Reports continued generalized abdominal pain as well as new onset "sharp" pains through epigastrium. Continues to request dilaudid 2 mg IV every 3H. Tolerating liquid diet without N/V. Still without bowel movement since admission, depsite adding lactulose to bowel regimen yesterday.         Review of Systems   Constitutional:  Positive for chills and diaphoresis. Negative for fever.   Respiratory:  Positive for cough. Negative for shortness of breath and wheezing.    Cardiovascular:  Negative for chest pain, palpitations and leg swelling.   Gastrointestinal:  Positive for abdominal pain, constipation and nausea. Negative for blood in stool, diarrhea and vomiting.   Genitourinary:  Negative for difficulty urinating and dysuria.   Musculoskeletal:  Negative for arthralgias and myalgias.   Skin:  Negative for color change.   Neurological:  Negative for dizziness and headaches.     Objective:     Vital Signs (Most Recent):  Temp: 98.5 °F (36.9 °C) (10/16/23 1109)  Pulse: 78 (10/16/23 1109)  Resp: 18 (10/16/23 1256)  BP: 117/71 (10/16/23 1109)  SpO2: (!) 94 % (10/16/23 1109) Vital Signs (24h Range):  Temp:  [98 °F (36.7 °C)-98.7 °F (37.1 °C)] 98.5 °F (36.9 °C)  Pulse:  [62-87] 78  Resp:  [16-20] 18  SpO2:  [93 %-97 %] 94 %  BP: (117-129)/(64-77) 117/71     Weight: 70.8 kg (156 lb)  Body mass index is 22.38 kg/m².    Intake/Output Summary (Last 24 hours) at 10/16/2023 1418  Last data filed at 10/16/2023 0544  Gross per 24 hour   Intake 400 ml   Output --   Net 400 ml           Physical Exam  Constitutional:       General: He is not in acute distress.     Appearance: Normal appearance. He is not ill-appearing.      Comments: Sitting upright in bed. Looking relatively comfortable.    HENT:      Head: Normocephalic and atraumatic.      Nose: No congestion or rhinorrhea.      Mouth/Throat:      Mouth: Mucous membranes are moist.    "   Pharynx: Oropharynx is clear.   Eyes:      General: No scleral icterus.     Extraocular Movements: Extraocular movements intact.   Cardiovascular:      Rate and Rhythm: Normal rate and regular rhythm.      Heart sounds: No murmur heard.  Pulmonary:      Effort: Pulmonary effort is normal. No respiratory distress.   Abdominal:      General: Bowel sounds are normal. There is no distension.      Tenderness: There is abdominal tenderness.   Musculoskeletal:      Right lower leg: No edema.      Left lower leg: No edema.   Skin:     General: Skin is warm and dry.   Neurological:      General: No focal deficit present.      Mental Status: He is alert and oriented to person, place, and time.   Psychiatric:         Mood and Affect: Mood normal.         Behavior: Behavior normal.             Significant Labs: All pertinent labs within the past 24 hours have been reviewed.    Significant Imaging: I have reviewed all pertinent imaging results/findings within the past 24 hours.

## 2023-10-16 NOTE — PROGRESS NOTES
[facility] - Intensive Care ([address] Medicine  Progress Note    Patient Name: Tasia Cardona  MRN: [id number]  Patient Class: IP- Inpatient   Admission Date: 10/12/2023  Length of Stay: 3 days  Attending Physician: Osmin Dutta MD  Primary Care Provider: Pina Jones NP        Subjective:     Principal Problem:Chronic pancreatitis  HPI:  23M PMH HIV (on Biktarvy, last CD4 296 from 9/20/23), EtOH use, acute necrotizing pancreatitis, splenic vein thrombosis, asthma, and anemia who presents to ED with abdominal pain, nausea, and vomiting. He was recently discharged on 10/10 after admission for acute necrotizing pancreatitis. His abdominal pain never fully resolved following discharge, and he was sent home with Dilaudid PO 2 mg PRN. Patient woke up early this morning with severe, generalized abdominal pain with radiation to scapula, nausea, and blood-tinged emesis after arriving to the ED.He denies any recent EtOH use since discharge.     He was evaluated by AES on previous admission, who recommended against any intervention on the pancreatic collection due to immaturity. He was treated with cefepime (as recommended by ID), which was continued after discharge via midline and patient reports compliance. Recent endoscopy (9/18/23) notable for a small Corinne-Chauhan tear and gastric erythema.     In ED, /91, tachycardic (128), and afebrile. CBC notable for slight leukocytosis (13.01 - increased since discharge) and CMP unremarkable. EKG w/ sinus tachycardia. CXR remarkable for small L pleural effusion. CT A/P similar to prior with lobulated peripancreatic fluid collection and thrombus within splenic vein but patent.    Overview/Hospital Course:  23 yom with Hiv (CD4 count 1000), ETOH abuse, recent acute necrotizing pancreatitis was D/C with midline on cefepime presenting with recurrence of symptoms. CT abdomen demonstrated continued inflammation of the pancreas and similar size cyst  "without concern for abscess at this time, with mild increase in lipase. Pt has been on dilaudid and taking it whenever possible. Fluids continued. GI consulted on admission; deferred acute need for procedure to drain cyst. Attempting to advance diet. Started on bowel regimen given high frequency of dilaudid.   Interval History: Resting in bed comfortably following breakfast. Alert, interactive, pleasant. Reports continued generalized abdominal pain as well as new onset "sharp" pains through epigastrium. Continues to request dilaudid 2 mg IV every 3H. Tolerating liquid diet without N/V. Still without bowel movement since admission, depsite adding lactulose to bowel regimen yesterday.         Review of Systems   Constitutional:  Positive for chills and diaphoresis. Negative for fever.   Respiratory:  Positive for cough. Negative for shortness of breath and wheezing.    Cardiovascular:  Negative for chest pain, palpitations and leg swelling.   Gastrointestinal:  Positive for abdominal pain, constipation and nausea. Negative for blood in stool, diarrhea and vomiting.   Genitourinary:  Negative for difficulty urinating and dysuria.   Musculoskeletal:  Negative for arthralgias and myalgias.   Skin:  Negative for color change.   Neurological:  Negative for dizziness and headaches.     Objective:     Vital Signs (Most Recent):  Temp: 98.5 °F (36.9 °C) (10/16/23 1109)  Pulse: 78 (10/16/23 1109)  Resp: 18 (10/16/23 1256)  BP: 117/71 (10/16/23 1109)  SpO2: (!) 94 % (10/16/23 1109) Vital Signs (24h Range):  Temp:  [98 °F (36.7 °C)-98.7 °F (37.1 °C)] 98.5 °F (36.9 °C)  Pulse:  [62-87] 78  Resp:  [16-20] 18  SpO2:  [93 %-97 %] 94 %  BP: (117-129)/(64-77) 117/71     Weight: 70.8 kg (156 lb)  Body mass index is 22.38 kg/m².    Intake/Output Summary (Last 24 hours) at 10/16/2023 4258  Last data filed at 10/16/2023 0544  Gross per 24 hour   Intake 400 ml   Output --   Net 400 ml           Physical Exam  Constitutional:       General: " He is not in acute distress.     Appearance: Normal appearance. He is not ill-appearing.      Comments: Sitting upright in bed. Looking relatively comfortable.    HENT:      Head: Normocephalic and atraumatic.      Nose: No congestion or rhinorrhea.      Mouth/Throat:      Mouth: Mucous membranes are moist.      Pharynx: Oropharynx is clear.   Eyes:      General: No scleral icterus.     Extraocular Movements: Extraocular movements intact.   Cardiovascular:      Rate and Rhythm: Normal rate and regular rhythm.      Heart sounds: No murmur heard.  Pulmonary:      Effort: Pulmonary effort is normal. No respiratory distress.   Abdominal:      General: Bowel sounds are normal. There is no distension.      Tenderness: There is abdominal tenderness.   Musculoskeletal:      Right lower leg: No edema.      Left lower leg: No edema.   Skin:     General: Skin is warm and dry.   Neurological:      General: No focal deficit present.      Mental Status: He is alert and oriented to person, place, and time.   Psychiatric:         Mood and Affect: Mood normal.         Behavior: Behavior normal.             Significant Labs: All pertinent labs within the past 24 hours have been reviewed.    Significant Imaging: I have reviewed all pertinent imaging results/findings within the past 24 hours.      Assessment/Plan:      * Chronic pancreatitis  23M PMH HIV (on Biktarvy, last CD4 296 from 9/20/23), EtOH use, acute necrotizing pancreatitis, splenic vein thrombosis, asthma, and anemia who presents to ED with abdominal pain, nausea, and vomiting. Recent discharge on 10/10 after admission for acute necrotizing pancreatitis. Interval increase WBC and lipase. Imaging completed on this admission without interval changes to collection size or air suggesting abscess. AES consulted on this admission, who deferred against tapping collection due to immaturity. ID consulted for antibiotic coverage on previous admission, and patient was discharged with  "cefepime after midline placement.     - Continue Cefepime - initial plan to continue 4w after 10/5 on previous admission (possible end date of 11/2)  - AES consulted, no intervention planned  - advancing diet as tolerated, continue liquids for now  - Pain control, nausea meds - add scheduled tylenol, motrin today  - bowel regimen initiated - pericolace BID, + lactulose TID today          SIRS (systemic inflammatory response syndrome)  - See A/P for chronic pancreatitis      Splenic vein thrombosis  Heme/onc consulted on previous admission after diagnosis of partially occlusive splenic vein thrombosis. Anticoagulation was deferred at that time, pending repeat imaging. On CT this admission, there is continued apparent thrombosis.  - Start Eliquis loading dose x 7d (complete 10/20, followed by 5 mg BID)      Mild intermittent asthma without complication  History of asthma requiring PRN albuterol. Has not recently been using any inhaler treatment.       Polycythemia vera  History of PV requiring therapeutic phlebotomy. Blood counts within appropriate range at this time.  - Continue to monitor daily CBC      HIV infection  This patient in known to have HIV+ status (Have detected HIV PCR but never CD4 <200 or AIDS defining illness). Labs reviewed- No results found for: "CD4", No results found for: "HIVDNAPCR". Patient is on HAART. Will continue. Continue to monitor routine labs.   Lab Results   Component Value Date    ABSOLUTECD4 1080 10/12/2023       - Continue home Biktarvy  - We will not consult Infectious disease at this time. Other HIV-associated diseases are not present.    CD4 count is in a very good range      VTE Risk Mitigation (From admission, onward)         Ordered     apixaban tablet 5 mg  2 times daily        See Hyperspace for full Linked Orders Report.    10/13/23 1036     apixaban tablet 10 mg  2 times daily        See Hyperspace for full Linked Orders Report.    10/13/23 1036     IP VTE HIGH RISK " PATIENT  Once         10/12/23 1713     Place sequential compression device  Until discontinued         10/12/23 1713     Reason for No Pharmacological VTE Prophylaxis  Once        Question:  Reasons:  Answer:  Risk of Bleeding    10/12/23 1713     Place sequential compression device  Until discontinued         10/12/23 1508                Discharge Planning   CASTILLO: 10/17/2023     Code Status: Full Code   Is the patient medically ready for discharge?: No    Reason for patient still in hospital (select all that apply): Treatment  Discharge Plan A: Home        Noah Trinh MD  Department of Hospital Medicine   [facility] - Intensive Care ([address]

## 2023-10-16 NOTE — ASSESSMENT & PLAN NOTE
23M PMH HIV (on Biktarvy, last CD4 296 from 9/20/23), EtOH use, acute necrotizing pancreatitis, splenic vein thrombosis, asthma, and anemia who presents to ED with abdominal pain, nausea, and vomiting. Recent discharge on 10/10 after admission for acute necrotizing pancreatitis. Interval increase WBC and lipase. Imaging completed on this admission without interval changes to collection size or air suggesting abscess. AES consulted on this admission, who deferred against tapping collection due to immaturity. ID consulted for antibiotic coverage on previous admission, and patient was discharged with cefepime after midline placement.     - Continue Cefepime - initial plan to continue 4w after 10/5 on previous admission (possible end date of 11/2)  - AES consulted, no intervention planned  - advancing diet as tolerated, continue liquids for now  - Pain control, nausea meds - add scheduled tylenol, motrin today  - bowel regimen initiated - pericolace BID, + lactulose TID today

## 2023-10-16 NOTE — ASSESSMENT & PLAN NOTE
Heme/onc consulted on previous admission after diagnosis of partially occlusive splenic vein thrombosis. Anticoagulation was deferred at that time, pending repeat imaging. On CT this admission, there is continued apparent thrombosis.  - Start Eliquis loading dose x 7d (complete 10/20, followed by 5 mg BID)

## 2023-10-17 LAB
ALBUMIN SERPL BCP-MCNC: 2.6 G/DL (ref 3.5–5.2)
ALP SERPL-CCNC: 68 U/L (ref 55–135)
ALT SERPL W/O P-5'-P-CCNC: 12 U/L (ref 10–44)
ANION GAP SERPL CALC-SCNC: 11 MMOL/L (ref 8–16)
AST SERPL-CCNC: 18 U/L (ref 10–40)
BASOPHILS # BLD AUTO: 0.03 K/UL (ref 0–0.2)
BASOPHILS NFR BLD: 0.6 % (ref 0–1.9)
BILIRUB SERPL-MCNC: 0.3 MG/DL (ref 0.1–1)
BUN SERPL-MCNC: 6 MG/DL (ref 6–20)
CALCIUM SERPL-MCNC: 8.6 MG/DL (ref 8.7–10.5)
CHLORIDE SERPL-SCNC: 107 MMOL/L (ref 95–110)
CO2 SERPL-SCNC: 21 MMOL/L (ref 23–29)
CREAT SERPL-MCNC: 0.6 MG/DL (ref 0.5–1.4)
DIFFERENTIAL METHOD: ABNORMAL
EOSINOPHIL # BLD AUTO: 0.5 K/UL (ref 0–0.5)
EOSINOPHIL NFR BLD: 9.7 % (ref 0–8)
ERYTHROCYTE [DISTWIDTH] IN BLOOD BY AUTOMATED COUNT: 18.4 % (ref 11.5–14.5)
EST. GFR  (NO RACE VARIABLE): >60 ML/MIN/1.73 M^2
GLUCOSE SERPL-MCNC: 82 MG/DL (ref 70–110)
HCT VFR BLD AUTO: 34.8 % (ref 40–54)
HGB BLD-MCNC: 10.7 G/DL (ref 14–18)
IMM GRANULOCYTES # BLD AUTO: 0.01 K/UL (ref 0–0.04)
IMM GRANULOCYTES NFR BLD AUTO: 0.2 % (ref 0–0.5)
LYMPHOCYTES # BLD AUTO: 1.3 K/UL (ref 1–4.8)
LYMPHOCYTES NFR BLD: 24.8 % (ref 18–48)
MAGNESIUM SERPL-MCNC: 2 MG/DL (ref 1.6–2.6)
MCH RBC QN AUTO: 28.8 PG (ref 27–31)
MCHC RBC AUTO-ENTMCNC: 30.7 G/DL (ref 32–36)
MCV RBC AUTO: 94 FL (ref 82–98)
MONOCYTES # BLD AUTO: 0.5 K/UL (ref 0.3–1)
MONOCYTES NFR BLD: 10.1 % (ref 4–15)
NEUTROPHILS # BLD AUTO: 2.8 K/UL (ref 1.8–7.7)
NEUTROPHILS NFR BLD: 54.6 % (ref 38–73)
NRBC BLD-RTO: 0 /100 WBC
PHOSPHATE SERPL-MCNC: 5 MG/DL (ref 2.7–4.5)
PLATELET # BLD AUTO: 322 K/UL (ref 150–450)
PMV BLD AUTO: 10.3 FL (ref 9.2–12.9)
POTASSIUM SERPL-SCNC: 3.7 MMOL/L (ref 3.5–5.1)
PROT SERPL-MCNC: 6 G/DL (ref 6–8.4)
RBC # BLD AUTO: 3.71 M/UL (ref 4.6–6.2)
SODIUM SERPL-SCNC: 139 MMOL/L (ref 136–145)
WBC # BLD AUTO: 5.04 K/UL (ref 3.9–12.7)

## 2023-10-17 PROCEDURE — 63600175 PHARM REV CODE 636 W HCPCS

## 2023-10-17 PROCEDURE — 36415 COLL VENOUS BLD VENIPUNCTURE: CPT

## 2023-10-17 PROCEDURE — 85025 COMPLETE CBC W/AUTO DIFF WBC: CPT

## 2023-10-17 PROCEDURE — 12000002 HC ACUTE/MED SURGE SEMI-PRIVATE ROOM

## 2023-10-17 PROCEDURE — 83735 ASSAY OF MAGNESIUM: CPT

## 2023-10-17 PROCEDURE — 25000003 PHARM REV CODE 250

## 2023-10-17 PROCEDURE — 99233 PR SUBSEQUENT HOSPITAL CARE,LEVL III: ICD-10-PCS | Mod: ,,, | Performed by: HOSPITALIST

## 2023-10-17 PROCEDURE — 99233 SBSQ HOSP IP/OBS HIGH 50: CPT | Mod: ,,, | Performed by: HOSPITALIST

## 2023-10-17 PROCEDURE — 84100 ASSAY OF PHOSPHORUS: CPT

## 2023-10-17 PROCEDURE — 80053 COMPREHEN METABOLIC PANEL: CPT

## 2023-10-17 RX ORDER — HYDROMORPHONE HYDROCHLORIDE 1 MG/ML
1.5 INJECTION, SOLUTION INTRAMUSCULAR; INTRAVENOUS; SUBCUTANEOUS
Status: DISCONTINUED | OUTPATIENT
Start: 2023-10-17 | End: 2023-10-18

## 2023-10-17 RX ORDER — HYDROMORPHONE HYDROCHLORIDE 1 MG/ML
1 INJECTION, SOLUTION INTRAMUSCULAR; INTRAVENOUS; SUBCUTANEOUS
Status: DISCONTINUED | OUTPATIENT
Start: 2023-10-17 | End: 2023-10-17

## 2023-10-17 RX ADMIN — Medication 6 MG: at 10:10

## 2023-10-17 RX ADMIN — APIXABAN 10 MG: 5 TABLET, FILM COATED ORAL at 08:10

## 2023-10-17 RX ADMIN — ACETAMINOPHEN 1000 MG: 500 TABLET ORAL at 10:10

## 2023-10-17 RX ADMIN — CEFEPIME 2 G: 2 INJECTION, POWDER, FOR SOLUTION INTRAVENOUS at 08:10

## 2023-10-17 RX ADMIN — SENNOSIDES AND DOCUSATE SODIUM 1 TABLET: 50; 8.6 TABLET ORAL at 08:10

## 2023-10-17 RX ADMIN — PANTOPRAZOLE SODIUM 40 MG: 40 TABLET, DELAYED RELEASE ORAL at 08:10

## 2023-10-17 RX ADMIN — HYDROMORPHONE HYDROCHLORIDE 1 MG: 1 INJECTION, SOLUTION INTRAMUSCULAR; INTRAVENOUS; SUBCUTANEOUS at 04:10

## 2023-10-17 RX ADMIN — HYDROMORPHONE HYDROCHLORIDE 2 MG: 1 INJECTION, SOLUTION INTRAMUSCULAR; INTRAVENOUS; SUBCUTANEOUS at 07:10

## 2023-10-17 RX ADMIN — HYDROMORPHONE HYDROCHLORIDE 2 MG: 1 INJECTION, SOLUTION INTRAMUSCULAR; INTRAVENOUS; SUBCUTANEOUS at 12:10

## 2023-10-17 RX ADMIN — HYDROMORPHONE HYDROCHLORIDE 2 MG: 1 INJECTION, SOLUTION INTRAMUSCULAR; INTRAVENOUS; SUBCUTANEOUS at 10:10

## 2023-10-17 RX ADMIN — CEFEPIME 2 G: 2 INJECTION, POWDER, FOR SOLUTION INTRAVENOUS at 06:10

## 2023-10-17 RX ADMIN — HYDROMORPHONE HYDROCHLORIDE 1.5 MG: 1 INJECTION, SOLUTION INTRAMUSCULAR; INTRAVENOUS; SUBCUTANEOUS at 10:10

## 2023-10-17 RX ADMIN — IBUPROFEN 400 MG: 200 TABLET, FILM COATED ORAL at 12:10

## 2023-10-17 RX ADMIN — PROCHLORPERAZINE EDISYLATE 5 MG: 5 INJECTION INTRAMUSCULAR; INTRAVENOUS at 10:10

## 2023-10-17 RX ADMIN — HYDROMORPHONE HYDROCHLORIDE 1.5 MG: 1 INJECTION, SOLUTION INTRAMUSCULAR; INTRAVENOUS; SUBCUTANEOUS at 07:10

## 2023-10-17 RX ADMIN — FOLIC ACID 1 MG: 1 TABLET ORAL at 09:10

## 2023-10-17 RX ADMIN — APIXABAN 10 MG: 5 TABLET, FILM COATED ORAL at 09:10

## 2023-10-17 RX ADMIN — PANTOPRAZOLE SODIUM 40 MG: 40 TABLET, DELAYED RELEASE ORAL at 09:10

## 2023-10-17 RX ADMIN — PROCHLORPERAZINE EDISYLATE 5 MG: 5 INJECTION INTRAMUSCULAR; INTRAVENOUS at 04:10

## 2023-10-17 RX ADMIN — ACETAMINOPHEN 1000 MG: 500 TABLET ORAL at 03:10

## 2023-10-17 RX ADMIN — CEFEPIME 2 G: 2 INJECTION, POWDER, FOR SOLUTION INTRAVENOUS at 03:10

## 2023-10-17 RX ADMIN — IBUPROFEN 400 MG: 200 TABLET, FILM COATED ORAL at 06:10

## 2023-10-17 RX ADMIN — ACETAMINOPHEN 1000 MG: 500 TABLET ORAL at 06:10

## 2023-10-17 RX ADMIN — HYDROMORPHONE HYDROCHLORIDE 1 MG: 1 INJECTION, SOLUTION INTRAMUSCULAR; INTRAVENOUS; SUBCUTANEOUS at 01:10

## 2023-10-17 RX ADMIN — HYDROMORPHONE HYDROCHLORIDE 2 MG: 1 INJECTION, SOLUTION INTRAMUSCULAR; INTRAVENOUS; SUBCUTANEOUS at 04:10

## 2023-10-17 RX ADMIN — PROCHLORPERAZINE EDISYLATE 5 MG: 5 INJECTION INTRAMUSCULAR; INTRAVENOUS at 09:10

## 2023-10-17 RX ADMIN — IBUPROFEN 400 MG: 200 TABLET, FILM COATED ORAL at 01:10

## 2023-10-17 RX ADMIN — BICTEGRAVIR SODIUM, EMTRICITABINE, AND TENOFOVIR ALAFENAMIDE FUMARATE 1 TABLET: 50; 200; 25 TABLET ORAL at 09:10

## 2023-10-17 RX ADMIN — PROCHLORPERAZINE EDISYLATE 5 MG: 5 INJECTION INTRAMUSCULAR; INTRAVENOUS at 12:10

## 2023-10-17 NOTE — ASSESSMENT & PLAN NOTE
"This patient in known to have HIV+ status (Have detected HIV PCR but never CD4 <200 or AIDS defining illness). Labs reviewed- No results found for: "CD4", No results found for: "HIVDNAPCR". Patient is on HAART. Will continue. Continue to monitor routine labs.   Lab Results   Component Value Date    ABSOLUTECD4 1080 10/12/2023       - Continue home Biktarvy  - We will not consult Infectious disease at this time. Other HIV-associated diseases are not present.    CD4 count is in a very good range  "

## 2023-10-17 NOTE — PLAN OF CARE
[facility] - Intensive Care ([facility])  Discharge Reassessment    Primary Care Provider: Pina Jones NP    Expected Discharge Date: 10/19/2023    Reassessment (most recent)       Discharge Reassessment - 10/17/23 1120          Discharge Reassessment    Assessment Type Discharge Planning Reassessment (P)      Did the patient's condition or plan change since previous assessment? No (P)      Discharge Plan discussed with: Patient (P)      Communicated CASTILLO with patient/caregiver No (P)      Discharge Plan A Home (P)      Discharge Plan B Home (P)      DME Needed Upon Discharge  none (P)      Transition of Care Barriers None (P)      Why the patient remains in the hospital Requires continued medical care (P)         Post-Acute Status    Discharge Delays None known at this time (P)                    CM spoke with pt in room.  D/c plan is to go home, denies any DME need.    RHONDA MartínezN, BS, RN, CCM

## 2023-10-17 NOTE — SUBJECTIVE & OBJECTIVE
Interval History: Resting in bed comfortably following breakfast. Alert, interactive, pleasant anf still complaining of generalized abdominal pain, while requesting dilaudid 2 mg IV every 3H. Requested to start regular diet last evening and has been tolerating today without issue. Full bowel movement yesterday.    Review of Systems   Constitutional:  Negative for fever.   Respiratory:  Positive for cough. Negative for shortness of breath and wheezing.    Cardiovascular:  Negative for chest pain, palpitations and leg swelling.   Gastrointestinal:  Positive for abdominal pain and nausea. Negative for blood in stool, diarrhea and vomiting.   Genitourinary:  Negative for difficulty urinating and dysuria.   Musculoskeletal:  Negative for arthralgias and myalgias.   Skin:  Negative for color change.   Neurological:  Negative for dizziness and headaches.     Objective:     Vital Signs (Most Recent):  Temp: 98.2 °F (36.8 °C) (10/17/23 1124)  Pulse: 81 (10/17/23 1124)  Resp: 20 (10/17/23 1302)  BP: 113/71 (10/17/23 1124)  SpO2: 95 % (10/17/23 1124) Vital Signs (24h Range):  Temp:  [97.9 °F (36.6 °C)-98.7 °F (37.1 °C)] 98.2 °F (36.8 °C)  Pulse:  [73-96] 81  Resp:  [17-20] 20  SpO2:  [93 %-97 %] 95 %  BP: (108-123)/(60-75) 113/71     Weight: 70.8 kg (156 lb)  Body mass index is 22.38 kg/m².  No intake or output data in the 24 hours ending 10/17/23 1511        Physical Exam  Constitutional:       General: He is not in acute distress.     Appearance: Normal appearance. He is not ill-appearing.      Comments: Sitting upright in bed comfortably. Interactive.    HENT:      Head: Normocephalic and atraumatic.      Nose: No congestion or rhinorrhea.      Mouth/Throat:      Mouth: Mucous membranes are moist.      Pharynx: Oropharynx is clear.   Eyes:      General: No scleral icterus.     Extraocular Movements: Extraocular movements intact.   Cardiovascular:      Rate and Rhythm: Normal rate and regular rhythm.      Heart sounds: No  murmur heard.  Pulmonary:      Effort: Pulmonary effort is normal. No respiratory distress.   Abdominal:      General: Bowel sounds are normal. There is no distension.      Tenderness: There is abdominal tenderness (deep palpation of bilateral upper quadrants; mildly tender in lower quadrants).   Musculoskeletal:      Right lower leg: No edema.      Left lower leg: No edema.   Skin:     General: Skin is warm and dry.   Neurological:      General: No focal deficit present.      Mental Status: He is alert and oriented to person, place, and time.   Psychiatric:         Mood and Affect: Mood normal.         Behavior: Behavior normal.             Significant Labs: All pertinent labs within the past 24 hours have been reviewed.    Significant Imaging: I have reviewed all pertinent imaging results/findings within the past 24 hours.

## 2023-10-17 NOTE — ASSESSMENT & PLAN NOTE
23M PMH HIV (on Biktarvy, last CD4 296 from 9/20/23), EtOH use, acute necrotizing pancreatitis, splenic vein thrombosis, asthma, and anemia who presents to ED with abdominal pain, nausea, and vomiting. Recent discharge on 10/10 after admission for acute necrotizing pancreatitis. Interval increase WBC and lipase. Imaging completed on this admission without interval changes to collection size or air suggesting abscess. AES consulted on this admission, who deferred against tapping collection due to immaturity. ID consulted for antibiotic coverage on previous admission, and patient was discharged with cefepime after midline placement.     - Continue Cefepime - initial plan to continue 4w after 10/5 on previous admission (possible end date of 11/2)  - AES consulted, no intervention planned  - advancing diet as tolerated, continue liquids for now  - Pain control, nausea meds - continue scheduled tylenol, motrin today; decrease dilaudid to 1 mg Q3H PRN  - bowel regimen initiated - pericolace BID, miralax. D/c lactulose in setting of diarrhea

## 2023-10-17 NOTE — PLAN OF CARE
Problem: Infection  Goal: Absence of Infection Signs and Symptoms  Outcome: Ongoing, Progressing     Problem: Fall Injury Risk  Goal: Absence of Fall and Fall-Related Injury  Outcome: Ongoing, Progressing   No significant changes noted. Pain and nausea meds given accordingly.  Pt stated that 1mg Dilaudid wasn't working. Pain 8/10. MD notified. Bed locked and in lowest position. Call light in reach.

## 2023-10-17 NOTE — PROGRESS NOTES
[facility] - Intensive Care ([address] Medicine  Progress Note    Patient Name: Tasia Cardona  MRN: [id number]  Patient Class: IP- Inpatient   Admission Date: 10/12/2023  Length of Stay: 4 days  Attending Physician: Osmin Dutta MD  Primary Care Provider: Pina Jones NP        Subjective:     Principal Problem:Chronic pancreatitis        HPI:  23M PMH HIV (on Biktarvy, last CD4 296 from 9/20/23), EtOH use, acute necrotizing pancreatitis, splenic vein thrombosis, asthma, and anemia who presents to ED with abdominal pain, nausea, and vomiting. He was recently discharged on 10/10 after admission for acute necrotizing pancreatitis. His abdominal pain never fully resolved following discharge, and he was sent home with Dilaudid PO 2 mg PRN. Patient woke up early this morning with severe, generalized abdominal pain with radiation to scapula, nausea, and blood-tinged emesis after arriving to the ED.He denies any recent EtOH use since discharge.     He was evaluated by AES on previous admission, who recommended against any intervention on the pancreatic collection due to immaturity. He was treated with cefepime (as recommended by ID), which was continued after discharge via midline and patient reports compliance. Recent endoscopy (9/18/23) notable for a small Corinne-Chauhan tear and gastric erythema.     In ED, /91, tachycardic (128), and afebrile. CBC notable for slight leukocytosis (13.01 - increased since discharge) and CMP unremarkable. EKG w/ sinus tachycardia. CXR remarkable for small L pleural effusion. CT A/P similar to prior with lobulated peripancreatic fluid collection and thrombus within splenic vein but patent.        Overview/Hospital Course:  23 yom with Hiv (CD4 count 1000), ETOH abuse, recent acute necrotizing pancreatitis was D/C with midline on cefepime presenting with recurrence of symptoms. CT abdomen demonstrated continued inflammation of the pancreas and similar size  cyst without concern for abscess at this time, with mild increase in lipase. Pt has been on dilaudid and taking it whenever possible. Fluids continued. GI consulted on admission; deferred acute need for procedure to drain cyst. Attempting to advance diet. Started on bowel regimen given high frequency of dilaudid with bowel movement on 10/16. Regular diet initiated on 10/17 and dilaudid decreased to 1 mg Q3H PRN.       Interval History: Resting in bed comfortably following breakfast. Alert, interactive, pleasant anf still complaining of generalized abdominal pain, while requesting dilaudid 2 mg IV every 3H. Requested to start regular diet last evening and has been tolerating today without issue. Full bowel movement yesterday.    Review of Systems   Constitutional:  Negative for fever.   Respiratory:  Positive for cough. Negative for shortness of breath and wheezing.    Cardiovascular:  Negative for chest pain, palpitations and leg swelling.   Gastrointestinal:  Positive for abdominal pain and nausea. Negative for blood in stool, diarrhea and vomiting.   Genitourinary:  Negative for difficulty urinating and dysuria.   Musculoskeletal:  Negative for arthralgias and myalgias.   Skin:  Negative for color change.   Neurological:  Negative for dizziness and headaches.     Objective:     Vital Signs (Most Recent):  Temp: 98.2 °F (36.8 °C) (10/17/23 1124)  Pulse: 81 (10/17/23 1124)  Resp: 20 (10/17/23 1302)  BP: 113/71 (10/17/23 1124)  SpO2: 95 % (10/17/23 1124) Vital Signs (24h Range):  Temp:  [97.9 °F (36.6 °C)-98.7 °F (37.1 °C)] 98.2 °F (36.8 °C)  Pulse:  [73-96] 81  Resp:  [17-20] 20  SpO2:  [93 %-97 %] 95 %  BP: (108-123)/(60-75) 113/71     Weight: 70.8 kg (156 lb)  Body mass index is 22.38 kg/m².  No intake or output data in the 24 hours ending 10/17/23 1511        Physical Exam  Constitutional:       General: He is not in acute distress.     Appearance: Normal appearance. He is not ill-appearing.      Comments:  Sitting upright in bed comfortably. Interactive.    HENT:      Head: Normocephalic and atraumatic.      Nose: No congestion or rhinorrhea.      Mouth/Throat:      Mouth: Mucous membranes are moist.      Pharynx: Oropharynx is clear.   Eyes:      General: No scleral icterus.     Extraocular Movements: Extraocular movements intact.   Cardiovascular:      Rate and Rhythm: Normal rate and regular rhythm.      Heart sounds: No murmur heard.  Pulmonary:      Effort: Pulmonary effort is normal. No respiratory distress.   Abdominal:      General: Bowel sounds are normal. There is no distension.      Tenderness: There is abdominal tenderness (deep palpation of bilateral upper quadrants; mildly tender in lower quadrants).   Musculoskeletal:      Right lower leg: No edema.      Left lower leg: No edema.   Skin:     General: Skin is warm and dry.   Neurological:      General: No focal deficit present.      Mental Status: He is alert and oriented to person, place, and time.   Psychiatric:         Mood and Affect: Mood normal.         Behavior: Behavior normal.             Significant Labs: All pertinent labs within the past 24 hours have been reviewed.    Significant Imaging: I have reviewed all pertinent imaging results/findings within the past 24 hours.      Assessment/Plan:      * Chronic pancreatitis  23M PMH HIV (on Biktarvy, last CD4 296 from 9/20/23), EtOH use, acute necrotizing pancreatitis, splenic vein thrombosis, asthma, and anemia who presents to ED with abdominal pain, nausea, and vomiting. Recent discharge on 10/10 after admission for acute necrotizing pancreatitis. Interval increase WBC and lipase. Imaging completed on this admission without interval changes to collection size or air suggesting abscess. AES consulted on this admission, who deferred against tapping collection due to immaturity. ID consulted for antibiotic coverage on previous admission, and patient was discharged with cefepime after midline  "placement.     - Continue Cefepime - initial plan to continue 4w after 10/5 on previous admission (possible end date of 11/2)  - AES consulted, no intervention planned  - advancing diet as tolerated, continue liquids for now  - Pain control, nausea meds - continue scheduled tylenol, motrin today; decrease dilaudid to 1 mg Q3H PRN  - bowel regimen initiated - pericolace BID, miralax. D/c lactulose in setting of diarrhea          SIRS (systemic inflammatory response syndrome)  - See A/P for chronic pancreatitis      Splenic vein thrombosis  Heme/onc consulted on previous admission after diagnosis of partially occlusive splenic vein thrombosis. Anticoagulation was deferred at that time, pending repeat imaging. On CT this admission, there is continued apparent thrombosis.  - Start Eliquis loading dose x 7d (complete 10/20, followed by 5 mg BID)      Mild intermittent asthma without complication  History of asthma requiring PRN albuterol. Has not recently been using any inhaler treatment.       Polycythemia vera  History of PV requiring therapeutic phlebotomy. Blood counts within appropriate range at this time.  - Continue to monitor daily CBC      HIV infection  This patient in known to have HIV+ status (Have detected HIV PCR but never CD4 <200 or AIDS defining illness). Labs reviewed- No results found for: "CD4", No results found for: "HIVDNAPCR". Patient is on HAART. Will continue. Continue to monitor routine labs.   Lab Results   Component Value Date    ABSOLUTECD4 1080 10/12/2023       - Continue home Biktarvy  - We will not consult Infectious disease at this time. Other HIV-associated diseases are not present.    CD4 count is in a very good range      VTE Risk Mitigation (From admission, onward)         Ordered     apixaban tablet 5 mg  2 times daily        See Hyperspace for full Linked Orders Report.    10/13/23 1036     apixaban tablet 10 mg  2 times daily        See Hyperspace for full Linked Orders Report.    " 10/13/23 1036     IP VTE HIGH RISK PATIENT  Once         10/12/23 1713     Place sequential compression device  Until discontinued         10/12/23 1713     Reason for No Pharmacological VTE Prophylaxis  Once        Question:  Reasons:  Answer:  Risk of Bleeding    10/12/23 1713     Place sequential compression device  Until discontinued         10/12/23 1508                Discharge Planning   CASTILLO: 10/19/2023     Code Status: Full Code   Is the patient medically ready for discharge?: No    Reason for patient still in hospital (select all that apply): Treatment  Discharge Plan A: Home   Discharge Delays: None known at this time     Noah Trinh MD  Department of Hospital Medicine   [facility] - Intensive Care ([place]-16)

## 2023-10-17 NOTE — MEDICAL/APP STUDENT
[facility] Medicine Student   Progress Note  [facility] HOSP MED 4    Admit Date: 10/12/2023  Hospital Day: 4  10/17/2023  2:19 PM    SUBJECTIVE:   Mr. Tasia Cardona is a 23 y.o. male with a relevant medical history of  HIV (on Biktarvy, last CD4 296 from 9/20/23), EtOH use, acute necrotizing pancreatitis, splenic vein thrombosis, asthma, and anemia who is being followed up for Chronic pancreatitis.    Interval history: Today the pt was sitting in be comfortably. He reports that his epigastric pain is still at an 8/10 and has not improved. Only relief of his pain is immediately after receiving dilaudid. He has transitioned to eating small amounts of solid food but reports significant nausea when doing so. GI reported that nothing could be done at this time. Overnight VS were stable. He cont to receive PO dilaudid q3 for pain and IV Zofran. Will cont to monitor for worsening symptoms     Review of Systems   Constitutional:  Positive for malaise/fatigue. Negative for chills and fever.   HENT:  Negative for congestion and sore throat.    Eyes:  Negative for blurred vision.   Respiratory:  Positive for cough and shortness of breath. Negative for hemoptysis and sputum production.    Cardiovascular:  Negative for chest pain and palpitations.   Gastrointestinal:  Positive for abdominal pain, heartburn, nausea and vomiting. Negative for blood in stool, constipation and diarrhea.   Musculoskeletal:  Positive for back pain. Negative for myalgias.   Skin:  Negative for rash.   Neurological:  Negative for dizziness, tingling, sensory change, focal weakness and headaches.   Endo/Heme/Allergies:  Does not bruise/bleed easily.   Psychiatric/Behavioral:  Negative for depression. The patient is nervous/anxious.      Please refer to the H&P for past medical, family, and social history.    OBJECTIVE:     Vital Signs Recent:  Temp: 98.2 °F (36.8 °C) (10/17/23 1124)  Pulse: 81 (10/17/23 1124)  Resp: 20 (10/17/23 1302)  BP: 113/71 (10/17/23  1124)  SpO2: 95 % (10/17/23 1124)  Oxygen Documentation:                Device (Oxygen Therapy): room air         Vital Signs Range (Last 24H):  Temp:  [97.9 °F (36.6 °C)-98.7 °F (37.1 °C)]   Pulse:  [73-96]   Resp:  [17-20]   BP: (108-123)/(60-75)   SpO2:  [93 %-97 %]        I & O (Last 24H):No intake or output data in the 24 hours ending 10/17/23 1419     Physical Exam  Constitutional:       General: He is not in acute distress.     Appearance: Normal appearance. He is ill-appearing.   HENT:      Head: Normocephalic and atraumatic.      Nose: Nose normal. No congestion.      Mouth/Throat:      Mouth: Mucous membranes are moist.      Pharynx: Oropharynx is clear. No posterior oropharyngeal erythema.   Eyes:      Pupils: Pupils are equal, round, and reactive to light.   Cardiovascular:      Rate and Rhythm: Regular rhythm. Tachycardia present.      Pulses: Normal pulses.      Heart sounds: No murmur heard.     No friction rub. No gallop.   Pulmonary:      Effort: No respiratory distress.      Breath sounds: Normal breath sounds. No stridor. No wheezing.   Abdominal:      General: Abdomen is flat.      Palpations: Abdomen is soft.      Tenderness: There is abdominal tenderness. There is guarding.   Musculoskeletal:         General: No swelling, tenderness, deformity or signs of injury.   Skin:     General: Skin is warm and dry.      Coloration: Skin is not jaundiced.   Neurological:      General: No focal deficit present.      Mental Status: He is alert and oriented to person, place, and time.   Psychiatric:         Mood and Affect: Mood normal.         Thought Content: Thought content normal.     Labs:   Recent Labs   Lab 10/15/23  0454 10/16/23  0430 10/17/23  0547    141 139   K 4.0 3.6 3.7    107 107   CO2 26 22* 21*   BUN 4* 3* 6   CREATININE 0.6 0.6 0.6    77 82   CALCIUM 8.3* 9.0 8.6*   MG 1.8 1.7 2.0   PHOS 4.8* 4.5 5.0*     Recent Labs   Lab 10/15/23  0454 10/16/23  0430 10/17/23  0547    ALKPHOS 61 66 68   ALT 15 13 12   AST 26 17 18   ALBUMIN 2.5* 2.7* 2.6*   PROT 6.0 6.2 6.0   BILITOT 0.2 0.4 0.3     Recent Labs   Lab 10/15/23  0454 10/16/23  0430 10/17/23  0547   WBC 4.50 4.93 5.04   HGB 9.3* 10.0* 10.7*   HCT 30.4* 32.0* 34.8*    307 322     Diagnostic Results:  None to report in the past 24 hours    Scheduled Meds:   acetaminophen  1,000 mg Oral Q8H    apixaban  10 mg Oral BID    Followed by    [START ON 10/20/2023] apixaban  5 mg Oral BID    twjbldsyv-xpjtszmz-lexzeme ala  1 tablet Oral Daily    ceFEPime (MAXIPIME) IVPB  2 g Intravenous Q8H    folic acid  1 mg Oral Daily    ibuprofen  400 mg Oral Q6H    pantoprazole  40 mg Oral BID    polyethylene glycol  17 g Oral BID    senna-docusate 8.6-50 mg  1 tablet Oral BID     Continuous Infusions:  PRN Meds:dextrose 10%, dextrose 10%, glucagon (human recombinant), glucose, glucose, HYDROmorphone, melatonin, naloxone, ondansetron, prochlorperazine, sodium chloride 0.9%, sodium chloride 0.9%    ASSESSMENT/PLAN:   Mr. Tasia Cardona is a 23 y.o. male with a relevant medical history of HIV (on Biktarvy, last CD4 296 from 9/20/23), EtOH use, acute necrotizing pancreatitis, splenic vein thrombosis, asthma, and anemia who is being followed up for Chronic pancreatitis.    Active Hospital Problems    Diagnosis  POA    *Chronic pancreatitis [K86.1]  Yes    SIRS (systemic inflammatory response syndrome) [R65.10]  Yes    Splenic vein thrombosis [I82.890]  Yes    Mild intermittent asthma without complication [J45.20]  Yes     Chronic    Polycythemia vera [D45]  Yes    HIV infection [B20]  Yes      Resolved Hospital Problems   No resolved problems to display.     Chronic pancreatitis  ASSESSMENT: 23M PMH HIV (on Biktarvy, last CD4 296 from 9/20/23), EtOH use, acute necrotizing pancreatitis, splenic vein thrombosis, asthma, and anemia who presents to ED with abdominal pain, nausea, and vomiting. Recent discharge on 10/10 after admission for acute  necrotizing pancreatitis. Interval increase WBC and lipase. Imaging completed on this admission without interval changes to collection size or air suggesting abscess. AES consulted on previous admission, who deferred against tapping collection due to immaturity. ID consulted for antibiotic coverage on previous admission, and patient was discharged with cefepime after midline placement  PLAN:  -IV NS fluids restarted  -Pain control decreased to 1 mg of dilaudid q3  -advance to solid diet, pt instructed to eat as tolerated  -Cont pantoprazole table 40 mg BID  -cont cefepime 2 g in dextrose 5% in water 100 mL IVPB      2. Splenic Vein Thrombosis  PLAN: heme onc was consulted following report of partially obstructed splenic vein thrombosis. On repeat CT on 10/12/23 splenic vein thrombosis still present.  Plan:  -apixaban tablet 10 mg and 5 mg PO daily     3. Corinne-Chauhan tear  ASSESSMENT: Per ED chart review, patient with blood-tinged emesis. Patient denies recent vomiting since discharge on 10/10. EGD from Sep 2023 notable for gastric erythema and small MW tear.   Plan:   - Continue PPI   - monitor for signs of hematemesis, melena as starting Eliquis 10/13     4. HIV  ASSESSMENT: pt has a previous diagnosis of HIV which is currently being treated. Last CD4 count was 296 on 09/20/23   PLAN:  -cont dtnizndyz-uivyisqq-hgfxvak ala -25 mg (25 kg or greater) 1 tablet daily     5. Mild intermittent asthma without complication  ASSESSMENT: pt has chronic asthma  PLAN:  -cont home regimen PRN  -observe for exacerbation      6. DVT Prophylaxis: compression devise until discontinued      Discharge planning:   Estimated Date of Discharge: Unknown  Code Status: Full Code  Discharge is contingent on patient. Patients VS must be stable, pt must remain afebrile for 24 hrs, pt must be adequately ambulatory     GAVIN Ferraro IV-LA, Medical student

## 2023-10-17 NOTE — PLAN OF CARE
Problem: Adult Inpatient Plan of Care  Goal: Absence of Hospital-Acquired Illness or Injury  Outcome: Ongoing, Progressing     Problem: Infection  Goal: Absence of Infection Signs and Symptoms  Outcome: Ongoing, Progressing     Problem: Fall Injury Risk  Goal: Absence of Fall and Fall-Related Injury  Outcome: Ongoing, Progressing     Pt AOx4. No acute events noted during shift. Vitals remained stable. Had c/o pain and discomfort, prn meds given per MD orders. Q1-2hr safety checks completed. Bed remained low, locked, with all personal items in reach.

## 2023-10-18 LAB
ALBUMIN SERPL BCP-MCNC: 2.6 G/DL (ref 3.5–5.2)
ALP SERPL-CCNC: 72 U/L (ref 55–135)
ALT SERPL W/O P-5'-P-CCNC: 8 U/L (ref 10–44)
ANION GAP SERPL CALC-SCNC: 11 MMOL/L (ref 8–16)
AST SERPL-CCNC: 13 U/L (ref 10–40)
BASOPHILS # BLD AUTO: 0.04 K/UL (ref 0–0.2)
BASOPHILS NFR BLD: 0.6 % (ref 0–1.9)
BILIRUB SERPL-MCNC: 0.3 MG/DL (ref 0.1–1)
BUN SERPL-MCNC: 7 MG/DL (ref 6–20)
CALCIUM SERPL-MCNC: 8.4 MG/DL (ref 8.7–10.5)
CHLORIDE SERPL-SCNC: 107 MMOL/L (ref 95–110)
CO2 SERPL-SCNC: 20 MMOL/L (ref 23–29)
CREAT SERPL-MCNC: 0.6 MG/DL (ref 0.5–1.4)
DIFFERENTIAL METHOD: ABNORMAL
EOSINOPHIL # BLD AUTO: 0.7 K/UL (ref 0–0.5)
EOSINOPHIL NFR BLD: 10.5 % (ref 0–8)
ERYTHROCYTE [DISTWIDTH] IN BLOOD BY AUTOMATED COUNT: 18.5 % (ref 11.5–14.5)
EST. GFR  (NO RACE VARIABLE): >60 ML/MIN/1.73 M^2
GLUCOSE SERPL-MCNC: 86 MG/DL (ref 70–110)
HCT VFR BLD AUTO: 32.8 % (ref 40–54)
HGB BLD-MCNC: 10.1 G/DL (ref 14–18)
IMM GRANULOCYTES # BLD AUTO: 0.01 K/UL (ref 0–0.04)
IMM GRANULOCYTES NFR BLD AUTO: 0.1 % (ref 0–0.5)
LYMPHOCYTES # BLD AUTO: 1.9 K/UL (ref 1–4.8)
LYMPHOCYTES NFR BLD: 28.3 % (ref 18–48)
MAGNESIUM SERPL-MCNC: 1.9 MG/DL (ref 1.6–2.6)
MCH RBC QN AUTO: 28.3 PG (ref 27–31)
MCHC RBC AUTO-ENTMCNC: 30.8 G/DL (ref 32–36)
MCV RBC AUTO: 92 FL (ref 82–98)
MONOCYTES # BLD AUTO: 0.8 K/UL (ref 0.3–1)
MONOCYTES NFR BLD: 11.8 % (ref 4–15)
NEUTROPHILS # BLD AUTO: 3.3 K/UL (ref 1.8–7.7)
NEUTROPHILS NFR BLD: 48.7 % (ref 38–73)
NRBC BLD-RTO: 0 /100 WBC
PHOSPHATE SERPL-MCNC: 4.8 MG/DL (ref 2.7–4.5)
PLATELET # BLD AUTO: 320 K/UL (ref 150–450)
PMV BLD AUTO: 10 FL (ref 9.2–12.9)
POTASSIUM SERPL-SCNC: 3.6 MMOL/L (ref 3.5–5.1)
PROT SERPL-MCNC: 6 G/DL (ref 6–8.4)
RBC # BLD AUTO: 3.57 M/UL (ref 4.6–6.2)
SODIUM SERPL-SCNC: 138 MMOL/L (ref 136–145)
WBC # BLD AUTO: 6.85 K/UL (ref 3.9–12.7)

## 2023-10-18 PROCEDURE — 25000003 PHARM REV CODE 250

## 2023-10-18 PROCEDURE — 84100 ASSAY OF PHOSPHORUS: CPT

## 2023-10-18 PROCEDURE — 85025 COMPLETE CBC W/AUTO DIFF WBC: CPT

## 2023-10-18 PROCEDURE — 36415 COLL VENOUS BLD VENIPUNCTURE: CPT

## 2023-10-18 PROCEDURE — 80053 COMPREHEN METABOLIC PANEL: CPT

## 2023-10-18 PROCEDURE — 63600175 PHARM REV CODE 636 W HCPCS

## 2023-10-18 PROCEDURE — 83735 ASSAY OF MAGNESIUM: CPT

## 2023-10-18 PROCEDURE — 99233 SBSQ HOSP IP/OBS HIGH 50: CPT | Mod: ,,, | Performed by: HOSPITALIST

## 2023-10-18 PROCEDURE — 12000002 HC ACUTE/MED SURGE SEMI-PRIVATE ROOM

## 2023-10-18 PROCEDURE — 99233 PR SUBSEQUENT HOSPITAL CARE,LEVL III: ICD-10-PCS | Mod: ,,, | Performed by: HOSPITALIST

## 2023-10-18 RX ORDER — HYDROMORPHONE HYDROCHLORIDE 1 MG/ML
1 INJECTION, SOLUTION INTRAMUSCULAR; INTRAVENOUS; SUBCUTANEOUS
Status: DISCONTINUED | OUTPATIENT
Start: 2023-10-18 | End: 2023-10-18

## 2023-10-18 RX ORDER — HYDROMORPHONE HYDROCHLORIDE 1 MG/ML
0.5 INJECTION, SOLUTION INTRAMUSCULAR; INTRAVENOUS; SUBCUTANEOUS
Status: DISCONTINUED | OUTPATIENT
Start: 2023-10-18 | End: 2023-10-19

## 2023-10-18 RX ORDER — HYDROMORPHONE HYDROCHLORIDE 2 MG/1
2 TABLET ORAL
Status: DISCONTINUED | OUTPATIENT
Start: 2023-10-18 | End: 2023-10-19

## 2023-10-18 RX ORDER — POLYETHYLENE GLYCOL 3350 17 G/17G
17 POWDER, FOR SOLUTION ORAL DAILY
Status: DISCONTINUED | OUTPATIENT
Start: 2023-10-19 | End: 2023-10-19 | Stop reason: HOSPADM

## 2023-10-18 RX ORDER — POTASSIUM CHLORIDE 20 MEQ/1
40 TABLET, EXTENDED RELEASE ORAL ONCE
Status: COMPLETED | OUTPATIENT
Start: 2023-10-18 | End: 2023-10-18

## 2023-10-18 RX ORDER — SODIUM CHLORIDE, SODIUM LACTATE, POTASSIUM CHLORIDE, CALCIUM CHLORIDE 600; 310; 30; 20 MG/100ML; MG/100ML; MG/100ML; MG/100ML
INJECTION, SOLUTION INTRAVENOUS CONTINUOUS
Status: ACTIVE | OUTPATIENT
Start: 2023-10-18 | End: 2023-10-18

## 2023-10-18 RX ADMIN — HYDROMORPHONE HYDROCHLORIDE 0.5 MG: 1 INJECTION, SOLUTION INTRAMUSCULAR; INTRAVENOUS; SUBCUTANEOUS at 08:10

## 2023-10-18 RX ADMIN — PROCHLORPERAZINE EDISYLATE 5 MG: 5 INJECTION INTRAMUSCULAR; INTRAVENOUS at 08:10

## 2023-10-18 RX ADMIN — SODIUM CHLORIDE, POTASSIUM CHLORIDE, SODIUM LACTATE AND CALCIUM CHLORIDE: 600; 310; 30; 20 INJECTION, SOLUTION INTRAVENOUS at 11:10

## 2023-10-18 RX ADMIN — ONDANSETRON 4 MG: 2 INJECTION INTRAMUSCULAR; INTRAVENOUS at 07:10

## 2023-10-18 RX ADMIN — IBUPROFEN 400 MG: 200 TABLET, FILM COATED ORAL at 05:10

## 2023-10-18 RX ADMIN — PANTOPRAZOLE SODIUM 40 MG: 40 TABLET, DELAYED RELEASE ORAL at 08:10

## 2023-10-18 RX ADMIN — BICTEGRAVIR SODIUM, EMTRICITABINE, AND TENOFOVIR ALAFENAMIDE FUMARATE 1 TABLET: 50; 200; 25 TABLET ORAL at 08:10

## 2023-10-18 RX ADMIN — HYDROMORPHONE HYDROCHLORIDE 0.5 MG: 1 INJECTION, SOLUTION INTRAMUSCULAR; INTRAVENOUS; SUBCUTANEOUS at 03:10

## 2023-10-18 RX ADMIN — APIXABAN 10 MG: 5 TABLET, FILM COATED ORAL at 08:10

## 2023-10-18 RX ADMIN — IBUPROFEN 400 MG: 200 TABLET, FILM COATED ORAL at 06:10

## 2023-10-18 RX ADMIN — POTASSIUM CHLORIDE 40 MEQ: 1500 TABLET, EXTENDED RELEASE ORAL at 08:10

## 2023-10-18 RX ADMIN — FOLIC ACID 1 MG: 1 TABLET ORAL at 08:10

## 2023-10-18 RX ADMIN — HYDROMORPHONE HYDROCHLORIDE 2 MG: 2 TABLET ORAL at 06:10

## 2023-10-18 RX ADMIN — CEFEPIME 2 G: 2 INJECTION, POWDER, FOR SOLUTION INTRAVENOUS at 04:10

## 2023-10-18 RX ADMIN — PROCHLORPERAZINE EDISYLATE 5 MG: 5 INJECTION INTRAMUSCULAR; INTRAVENOUS at 03:10

## 2023-10-18 RX ADMIN — CEFEPIME 2 G: 2 INJECTION, POWDER, FOR SOLUTION INTRAVENOUS at 02:10

## 2023-10-18 RX ADMIN — HYDROMORPHONE HYDROCHLORIDE 2 MG: 2 TABLET ORAL at 11:10

## 2023-10-18 RX ADMIN — HYDROMORPHONE HYDROCHLORIDE 1.5 MG: 1 INJECTION, SOLUTION INTRAMUSCULAR; INTRAVENOUS; SUBCUTANEOUS at 01:10

## 2023-10-18 RX ADMIN — ACETAMINOPHEN 1000 MG: 500 TABLET ORAL at 08:10

## 2023-10-18 RX ADMIN — ACETAMINOPHEN 1000 MG: 500 TABLET ORAL at 02:10

## 2023-10-18 RX ADMIN — Medication 6 MG: at 08:10

## 2023-10-18 RX ADMIN — CEFEPIME 2 G: 2 INJECTION, POWDER, FOR SOLUTION INTRAVENOUS at 08:10

## 2023-10-18 RX ADMIN — IBUPROFEN 400 MG: 200 TABLET, FILM COATED ORAL at 11:10

## 2023-10-18 RX ADMIN — HYDROMORPHONE HYDROCHLORIDE 1 MG: 1 INJECTION, SOLUTION INTRAMUSCULAR; INTRAVENOUS; SUBCUTANEOUS at 08:10

## 2023-10-18 RX ADMIN — SENNOSIDES AND DOCUSATE SODIUM 1 TABLET: 50; 8.6 TABLET ORAL at 08:10

## 2023-10-18 RX ADMIN — HYDROMORPHONE HYDROCHLORIDE 1.5 MG: 1 INJECTION, SOLUTION INTRAMUSCULAR; INTRAVENOUS; SUBCUTANEOUS at 04:10

## 2023-10-18 RX ADMIN — IBUPROFEN 400 MG: 200 TABLET, FILM COATED ORAL at 01:10

## 2023-10-18 NOTE — SUBJECTIVE & OBJECTIVE
Interval History: Resting in bed comfortably following breakfast. Alert, interactive, pleasant.    No change to reported abdominal pain. Remains requesting dilaudid IV every 3H. Another bowel movement last evening.     Review of Systems   Constitutional:  Negative for fever.   Respiratory:  Positive for cough. Negative for shortness of breath and wheezing.    Cardiovascular:  Negative for chest pain, palpitations and leg swelling.   Gastrointestinal:  Positive for abdominal pain and nausea. Negative for blood in stool, diarrhea and vomiting.   Genitourinary:  Negative for difficulty urinating and dysuria.   Musculoskeletal:  Negative for arthralgias and myalgias.   Skin:  Negative for color change.   Neurological:  Negative for dizziness and headaches.     Objective:     Vital Signs (Most Recent):  Temp: 98.3 °F (36.8 °C) (10/18/23 1140)  Pulse: 91 (10/18/23 1140)  Resp: 18 (10/18/23 1140)  BP: 120/76 (10/18/23 1140)  SpO2: (!) 93 % (10/18/23 1140) Vital Signs (24h Range):  Temp:  [97.9 °F (36.6 °C)-98.8 °F (37.1 °C)] 98.3 °F (36.8 °C)  Pulse:  [79-91] 91  Resp:  [16-22] 18  SpO2:  [92 %-94 %] 93 %  BP: (109-121)/(70-77) 120/76     Weight: 70.8 kg (156 lb)  Body mass index is 22.38 kg/m².    Intake/Output Summary (Last 24 hours) at 10/18/2023 1251  Last data filed at 10/18/2023 1042  Gross per 24 hour   Intake 240 ml   Output 0 ml   Net 240 ml           Physical Exam  Constitutional:       General: He is not in acute distress.     Appearance: Normal appearance. He is not ill-appearing.      Comments: Sitting upright in bed comfortably. Interactive.    HENT:      Head: Normocephalic and atraumatic.      Nose: No congestion or rhinorrhea.      Mouth/Throat:      Mouth: Mucous membranes are moist.      Pharynx: Oropharynx is clear.   Eyes:      General: No scleral icterus.     Extraocular Movements: Extraocular movements intact.   Cardiovascular:      Rate and Rhythm: Normal rate and regular rhythm.      Heart sounds:  No murmur heard.  Pulmonary:      Effort: Pulmonary effort is normal. No respiratory distress.   Abdominal:      General: Bowel sounds are normal. There is no distension.      Tenderness: There is abdominal tenderness (deep palpation of bilateral upper quadrants; mildly tender in lower quadrants).   Musculoskeletal:      Right lower leg: No edema.      Left lower leg: No edema.   Skin:     General: Skin is warm and dry.   Neurological:      General: No focal deficit present.      Mental Status: He is alert and oriented to person, place, and time.   Psychiatric:         Mood and Affect: Mood normal.         Behavior: Behavior normal.             Significant Labs: All pertinent labs within the past 24 hours have been reviewed.    Significant Imaging: I have reviewed all pertinent imaging results/findings within the past 24 hours.

## 2023-10-18 NOTE — PROGRESS NOTES
[facility] - Int[facility] Care ([address] Medicine  Progress Note    Patient Name: Taisa Cardona  MRN: [id number]  Patient Class: IP- Inpatient   Admission Date: 10/12/2023  Length of Stay: 5 days  Attending Physician: Osmin Dutta MD  Primary Care Provider: Pina Jones NP        Subjective:     Principal Problem:Chronic pancreatitis        HPI:  23M PMH HIV (on Biktarvy, last CD4 296 from 9/20/23), EtOH use, acute necrotizing pancreatitis, splenic vein thrombosis, asthma, and anemia who presents to ED with abdominal pain, nausea, and vomiting. He was recently discharged on 10/10 after admission for acute necrotizing pancreatitis. His abdominal pain never fully resolved following discharge, and he was sent home with Dilaudid PO 2 mg PRN. Patient woke up early this morning with severe, generalized abdominal pain with radiation to scapula, nausea, and blood-tinged emesis after arriving to the ED.He denies any recent EtOH use since discharge.     He was evaluated by [facility] on previous admission, who recommended against any intervention on the pancreatic collection due to immaturity. He was treated with cefepime (as recommended by ID), which was continued after discharge via midline and patient reports compliance. Recent endoscopy (9/18/23) notable for a small Corinne-Chauhan tear and gastric erythema.     In ED, /91, tachycardic (128), and afebrile. CBC notable for slight leukocytosis (13.01 - increased since discharge) and CMP unremarkable. EKG w/ sinus tachycardia. CXR remarkable for small L pleural effusion. CT A/P similar to prior with lobulated peripancreatic fluid collection and thrombus within splenic vein but patent.        Overview/Hospital Course:  23 yom with Hiv (CD4 count 1000), ETOH abuse, recent acute necrotizing pancreatitis was D/C with midline on cefepime presenting with recurrence of symptoms. CT abdomen demonstrated continued inflammation of the pancreas and similar size  cyst without concern for abscess at this time, with mild increase in lipase. Pt has been on dilaudid and taking it whenever possible. Fluids continued. GI consulted on admission; deferred acute need for procedure to drain cyst. Attempting to advance diet. Started on bowel regimen given high frequency of dilaudid with bowel movement on 10/16. Regular diet initiated on 10/17 and dilaudid decreased to 1 mg Q3H PRN.       Interval History: Resting in bed comfortably following breakfast. Alert, interactive, pleasant.    No change to reported abdominal pain. Remains requesting dilaudid IV every 3H. Another bowel movement last evening.     Review of Systems   Constitutional:  Negative for fever.   Respiratory:  Positive for cough. Negative for shortness of breath and wheezing.    Cardiovascular:  Negative for chest pain, palpitations and leg swelling.   Gastrointestinal:  Positive for abdominal pain and nausea. Negative for blood in stool, diarrhea and vomiting.   Genitourinary:  Negative for difficulty urinating and dysuria.   Musculoskeletal:  Negative for arthralgias and myalgias.   Skin:  Negative for color change.   Neurological:  Negative for dizziness and headaches.     Objective:     Vital Signs (Most Recent):  Temp: 98.3 °F (36.8 °C) (10/18/23 1140)  Pulse: 91 (10/18/23 1140)  Resp: 18 (10/18/23 1140)  BP: 120/76 (10/18/23 1140)  SpO2: (!) 93 % (10/18/23 1140) Vital Signs (24h Range):  Temp:  [97.9 °F (36.6 °C)-98.8 °F (37.1 °C)] 98.3 °F (36.8 °C)  Pulse:  [79-91] 91  Resp:  [16-22] 18  SpO2:  [92 %-94 %] 93 %  BP: (109-121)/(70-77) 120/76     Weight: 70.8 kg (156 lb)  Body mass index is 22.38 kg/m².    Intake/Output Summary (Last 24 hours) at 10/18/2023 1251  Last data filed at 10/18/2023 1042  Gross per 24 hour   Intake 240 ml   Output 0 ml   Net 240 ml           Physical Exam  Constitutional:       General: He is not in acute distress.     Appearance: Normal appearance. He is not ill-appearing.      Comments:  Sitting upright in bed comfortably. Interactive.    HENT:      Head: Normocephalic and atraumatic.      Nose: No congestion or rhinorrhea.      Mouth/Throat:      Mouth: Mucous membranes are moist.      Pharynx: Oropharynx is clear.   Eyes:      General: No scleral icterus.     Extraocular Movements: Extraocular movements intact.   Cardiovascular:      Rate and Rhythm: Normal rate and regular rhythm.      Heart sounds: No murmur heard.  Pulmonary:      Effort: Pulmonary effort is normal. No respiratory distress.   Abdominal:      General: Bowel sounds are normal. There is no distension.      Tenderness: There is abdominal tenderness (deep palpation of bilateral upper quadrants; mildly tender in lower quadrants).   Musculoskeletal:      Right lower leg: No edema.      Left lower leg: No edema.   Skin:     General: Skin is warm and dry.   Neurological:      General: No focal deficit present.      Mental Status: He is alert and oriented to person, place, and time.   Psychiatric:         Mood and Affect: Mood normal.         Behavior: Behavior normal.             Significant Labs: All pertinent labs within the past 24 hours have been reviewed.    Significant Imaging: I have reviewed all pertinent imaging results/findings within the past 24 hours.      Assessment/Plan:      * Chronic pancreatitis  23M PMH HIV (on Biktarvy, last CD4 296 from 9/20/23), EtOH use, acute necrotizing pancreatitis, splenic vein thrombosis, asthma, and anemia who presents to ED with abdominal pain, nausea, and vomiting. Recent discharge on 10/10 after admission for acute necrotizing pancreatitis. Interval increase WBC and lipase. Imaging completed on this admission without interval changes to collection size or air suggesting abscess. [facility] consulted on this admission, who deferred against tapping collection due to immaturity. ID consulted for antibiotic coverage on previous admission, and patient was discharged with cefepime after midline  "placement.     - Continue Cefepime - initial plan to continue 4w after 10/5 on previous admission (possible end date of 11/2)  - [facility] consulted, no intervention planned  - advancing diet as tolerated, continue liquids for now  - Pain control, nausea meds - continue scheduled tylenol, motrin today; start PO dilaudid tablets 2 mg Q3H for severe pain and decrease dilaudid to 0.5 mg IV Q3H PRN for breakthrough only  - bowel regimen initiated - pericolace BID, miralax. D/c lactulose in setting of diarrhea          SIRS (systemic inflammatory response syndrome)  - See A/P for chronic pancreatitis      Splenic vein thrombosis  Heme/onc consulted on previous admission after diagnosis of partially occlusive splenic vein thrombosis. Anticoagulation was deferred at that time, pending repeat imaging. On CT this admission, there is continued apparent thrombosis.  - Start Eliquis loading dose x 7d (complete 10/20, followed by 5 mg BID)      Mild intermittent asthma without complication  History of asthma requiring PRN albuterol. Has not recently been using any inhaler treatment.       Polycythemia vera  History of PV requiring therapeutic phlebotomy. Blood counts within appropriate range at this time.  - Continue to monitor daily CBC      HIV infection  This patient in known to have HIV+ status (Have detected HIV PCR but never CD4 <200 or AIDS defining illness). Labs reviewed- No results found for: "CD4", No results found for: "HIVDNAPCR". Patient is on HAART. Will continue. Continue to monitor routine labs.   Lab Results   Component Value Date    ABSOLUTECD4 1080 10/12/2023       - Continue home Biktarvy  - We will not consult Infectious disease at this time. Other HIV-associated diseases are not present.    CD4 count is in a very good range      VTE Risk Mitigation (From admission, onward)         Ordered     apixaban tablet 5 mg  2 times daily        See [facility] for full Linked Orders Report.    10/13/23 1036     apixaban " tablet 10 mg  2 times daily        See [facility] for full Linked Orders Report.    10/13/23 1036     IP VTE HIGH RISK PATIENT  Once         10/12/23 1713     Place sequential compression device  Until discontinued         10/12/23 1713     Reason for No Pharmacological VTE Prophylaxis  Once        Question:  Reasons:  Answer:  Risk of Bleeding    10/12/23 1713     Place sequential compression device  Until discontinued         10/12/23 1508                Discharge Planning   CASTILLO: 10/19/2023     Code Status: Full Code   Is the patient medically ready for discharge?: No    Reason for patient still in hospital (select all that apply): Treatment  Discharge Plan A: Home   Discharge Delays: None known at this time              Noah Trinh MD  Department of Hospital Medicine   [facility] - Intensive Care ([place]-[facility])

## 2023-10-18 NOTE — ASSESSMENT & PLAN NOTE
23M PMH HIV (on Biktarvy, last CD4 296 from 9/20/23), EtOH use, acute necrotizing pancreatitis, splenic vein thrombosis, asthma, and anemia who presents to ED with abdominal pain, nausea, and vomiting. Recent discharge on 10/10 after admission for acute necrotizing pancreatitis. Interval increase WBC and lipase. Imaging completed on this admission without interval changes to collection size or air suggesting abscess. [facility] consulted on this admission, who deferred against tapping collection due to immaturity. ID consulted for antibiotic coverage on previous admission, and patient was discharged with cefepime after midline placement.     - Continue Cefepime - initial plan to continue 4w after 10/5 on previous admission (possible end date of 11/2)  - [facility] consulted, no intervention planned  - advancing diet as tolerated, continue liquids for now  - Pain control, nausea meds - continue scheduled tylenol, motrin today; start PO dilaudid tablets 2 mg Q3H for severe pain and decrease dilaudid to 0.5 mg IV Q3H PRN for breakthrough only  - bowel regimen initiated - pericolace BID, miralax. D/c lactulose in setting of diarrhea

## 2023-10-18 NOTE — PLAN OF CARE
Problem: Adult Inpatient Plan of Care  Goal: Plan of Care Review  Outcome: Ongoing, Progressing     Problem: Infection (Pancreatitis)  Goal: Infection Symptom Resolution  Outcome: Ongoing, Progressing     Problem: Pain (Pancreatitis)  Goal: Acceptable Pain Control  Outcome: Ongoing, Progressing

## 2023-10-19 VITALS
WEIGHT: 156 LBS | DIASTOLIC BLOOD PRESSURE: 81 MMHG | HEART RATE: 84 BPM | BODY MASS INDEX: 22.33 KG/M2 | OXYGEN SATURATION: 96 % | SYSTOLIC BLOOD PRESSURE: 126 MMHG | HEIGHT: 70 IN | TEMPERATURE: 98 F | RESPIRATION RATE: 18 BRPM

## 2023-10-19 PROBLEM — R07.89 OTHER CHEST PAIN: Status: RESOLVED | Noted: 2023-09-22 | Resolved: 2023-10-19

## 2023-10-19 PROBLEM — R10.9 CONTINUOUS SEVERE ABDOMINAL PAIN: Status: ACTIVE | Noted: 2023-10-19

## 2023-10-19 PROBLEM — H10.33 ACUTE CONJUNCTIVITIS OF BOTH EYES: Status: RESOLVED | Noted: 2021-11-02 | Resolved: 2023-10-19

## 2023-10-19 LAB
ALBUMIN SERPL BCP-MCNC: 2.6 G/DL (ref 3.5–5.2)
ALP SERPL-CCNC: 71 U/L (ref 55–135)
ALT SERPL W/O P-5'-P-CCNC: 7 U/L (ref 10–44)
ANION GAP SERPL CALC-SCNC: 10 MMOL/L (ref 8–16)
AST SERPL-CCNC: 12 U/L (ref 10–40)
BASOPHILS # BLD AUTO: 0.04 K/UL (ref 0–0.2)
BASOPHILS NFR BLD: 0.7 % (ref 0–1.9)
BILIRUB SERPL-MCNC: 0.3 MG/DL (ref 0.1–1)
BUN SERPL-MCNC: 5 MG/DL (ref 6–20)
CALCIUM SERPL-MCNC: 8.5 MG/DL (ref 8.7–10.5)
CHLORIDE SERPL-SCNC: 109 MMOL/L (ref 95–110)
CO2 SERPL-SCNC: 20 MMOL/L (ref 23–29)
CREAT SERPL-MCNC: 0.6 MG/DL (ref 0.5–1.4)
DIFFERENTIAL METHOD: ABNORMAL
EOSINOPHIL # BLD AUTO: 0.7 K/UL (ref 0–0.5)
EOSINOPHIL NFR BLD: 11.1 % (ref 0–8)
ERYTHROCYTE [DISTWIDTH] IN BLOOD BY AUTOMATED COUNT: 18.4 % (ref 11.5–14.5)
EST. GFR  (NO RACE VARIABLE): >60 ML/MIN/1.73 M^2
GLUCOSE SERPL-MCNC: 84 MG/DL (ref 70–110)
HCT VFR BLD AUTO: 31.7 % (ref 40–54)
HGB BLD-MCNC: 9.8 G/DL (ref 14–18)
IMM GRANULOCYTES # BLD AUTO: 0.01 K/UL (ref 0–0.04)
IMM GRANULOCYTES NFR BLD AUTO: 0.2 % (ref 0–0.5)
LYMPHOCYTES # BLD AUTO: 1.9 K/UL (ref 1–4.8)
LYMPHOCYTES NFR BLD: 32.3 % (ref 18–48)
MAGNESIUM SERPL-MCNC: 1.8 MG/DL (ref 1.6–2.6)
MCH RBC QN AUTO: 28.5 PG (ref 27–31)
MCHC RBC AUTO-ENTMCNC: 30.9 G/DL (ref 32–36)
MCV RBC AUTO: 92 FL (ref 82–98)
MONOCYTES # BLD AUTO: 0.7 K/UL (ref 0.3–1)
MONOCYTES NFR BLD: 11.1 % (ref 4–15)
NEUTROPHILS # BLD AUTO: 2.7 K/UL (ref 1.8–7.7)
NEUTROPHILS NFR BLD: 44.6 % (ref 38–73)
NRBC BLD-RTO: 0 /100 WBC
PHOSPHATE SERPL-MCNC: 4 MG/DL (ref 2.7–4.5)
PLATELET # BLD AUTO: 295 K/UL (ref 150–450)
PMV BLD AUTO: 10.5 FL (ref 9.2–12.9)
POTASSIUM SERPL-SCNC: 3.6 MMOL/L (ref 3.5–5.1)
PROT SERPL-MCNC: 6.2 G/DL (ref 6–8.4)
RBC # BLD AUTO: 3.44 M/UL (ref 4.6–6.2)
SODIUM SERPL-SCNC: 139 MMOL/L (ref 136–145)
WBC # BLD AUTO: 5.94 K/UL (ref 3.9–12.7)

## 2023-10-19 PROCEDURE — 85025 COMPLETE CBC W/AUTO DIFF WBC: CPT

## 2023-10-19 PROCEDURE — 36415 COLL VENOUS BLD VENIPUNCTURE: CPT

## 2023-10-19 PROCEDURE — 25000003 PHARM REV CODE 250

## 2023-10-19 PROCEDURE — 63600175 PHARM REV CODE 636 W HCPCS

## 2023-10-19 PROCEDURE — 80053 COMPREHEN METABOLIC PANEL: CPT

## 2023-10-19 PROCEDURE — 99239 PR HOSPITAL DISCHARGE DAY,>30 MIN: ICD-10-PCS | Mod: ,,, | Performed by: INTERNAL MEDICINE

## 2023-10-19 PROCEDURE — 83735 ASSAY OF MAGNESIUM: CPT

## 2023-10-19 PROCEDURE — 84100 ASSAY OF PHOSPHORUS: CPT

## 2023-10-19 PROCEDURE — 99239 HOSP IP/OBS DSCHRG MGMT >30: CPT | Mod: ,,, | Performed by: INTERNAL MEDICINE

## 2023-10-19 RX ORDER — MORPHINE SULFATE 15 MG/1
TABLET, FILM COATED, EXTENDED RELEASE ORAL
Qty: 42 TABLET | Refills: 0 | Status: ON HOLD | OUTPATIENT
Start: 2023-10-19 | End: 2023-11-09 | Stop reason: HOSPADM

## 2023-10-19 RX ORDER — MORPHINE SULFATE 15 MG/1
15 TABLET ORAL EVERY 4 HOURS PRN
Qty: 42 TABLET | Refills: 0 | Status: SHIPPED | OUTPATIENT
Start: 2023-10-19 | End: 2023-10-19 | Stop reason: SDUPTHER

## 2023-10-19 RX ORDER — MORPHINE SULFATE 15 MG/1
15 TABLET ORAL EVERY 4 HOURS PRN
Status: DISCONTINUED | OUTPATIENT
Start: 2023-10-19 | End: 2023-10-19 | Stop reason: HOSPADM

## 2023-10-19 RX ORDER — MORPHINE SULFATE 15 MG/1
15 TABLET ORAL EVERY 6 HOURS PRN
Qty: 42 TABLET | Refills: 0 | Status: ON HOLD | OUTPATIENT
Start: 2023-10-19 | End: 2023-11-09 | Stop reason: HOSPADM

## 2023-10-19 RX ORDER — MORPHINE SULFATE 15 MG/1
15 TABLET, FILM COATED, EXTENDED RELEASE ORAL EVERY 12 HOURS
Status: DISCONTINUED | OUTPATIENT
Start: 2023-10-19 | End: 2023-10-19 | Stop reason: HOSPADM

## 2023-10-19 RX ADMIN — CEFEPIME 2 G: 2 INJECTION, POWDER, FOR SOLUTION INTRAVENOUS at 05:10

## 2023-10-19 RX ADMIN — HYDROMORPHONE HYDROCHLORIDE 2 MG: 2 TABLET ORAL at 12:10

## 2023-10-19 RX ADMIN — HYDROMORPHONE HYDROCHLORIDE 0.5 MG: 1 INJECTION, SOLUTION INTRAMUSCULAR; INTRAVENOUS; SUBCUTANEOUS at 09:10

## 2023-10-19 RX ADMIN — FOLIC ACID 1 MG: 1 TABLET ORAL at 09:10

## 2023-10-19 RX ADMIN — IBUPROFEN 400 MG: 200 TABLET, FILM COATED ORAL at 11:10

## 2023-10-19 RX ADMIN — PROCHLORPERAZINE EDISYLATE 5 MG: 5 INJECTION INTRAMUSCULAR; INTRAVENOUS at 05:10

## 2023-10-19 RX ADMIN — MORPHINE SULFATE 15 MG: 15 TABLET ORAL at 02:10

## 2023-10-19 RX ADMIN — MORPHINE SULFATE 15 MG: 15 TABLET, EXTENDED RELEASE ORAL at 11:10

## 2023-10-19 RX ADMIN — CEFEPIME 2 G: 2 INJECTION, POWDER, FOR SOLUTION INTRAVENOUS at 02:10

## 2023-10-19 RX ADMIN — ACETAMINOPHEN 1000 MG: 500 TABLET ORAL at 05:10

## 2023-10-19 RX ADMIN — SENNOSIDES AND DOCUSATE SODIUM 1 TABLET: 50; 8.6 TABLET ORAL at 09:10

## 2023-10-19 RX ADMIN — HYDROMORPHONE HYDROCHLORIDE 0.5 MG: 1 INJECTION, SOLUTION INTRAMUSCULAR; INTRAVENOUS; SUBCUTANEOUS at 03:10

## 2023-10-19 RX ADMIN — IBUPROFEN 400 MG: 200 TABLET, FILM COATED ORAL at 05:10

## 2023-10-19 RX ADMIN — IBUPROFEN 400 MG: 200 TABLET, FILM COATED ORAL at 12:10

## 2023-10-19 RX ADMIN — ONDANSETRON 4 MG: 2 INJECTION INTRAMUSCULAR; INTRAVENOUS at 01:10

## 2023-10-19 RX ADMIN — PANTOPRAZOLE SODIUM 40 MG: 40 TABLET, DELAYED RELEASE ORAL at 09:10

## 2023-10-19 RX ADMIN — HYDROMORPHONE HYDROCHLORIDE 2 MG: 2 TABLET ORAL at 05:10

## 2023-10-19 RX ADMIN — BICTEGRAVIR SODIUM, EMTRICITABINE, AND TENOFOVIR ALAFENAMIDE FUMARATE 1 TABLET: 50; 200; 25 TABLET ORAL at 09:10

## 2023-10-19 RX ADMIN — APIXABAN 10 MG: 5 TABLET, FILM COATED ORAL at 09:10

## 2023-10-19 NOTE — DISCHARGE SUMMARY
[facility] - Intensive Care ([place] [address])  [facility] Medicine  Discharge Summary      Patient Name: Tasia Cardona  MRN: [id number]  HEATHER: [id number]  Patient Class: IP- Inpatient  Admission Date: 10/12/2023  Hospital Length of Stay: 6 days  Discharge Date and Time:  10/19/2023 5:59 PM  Attending Physician: Srini Mtz MD   Discharging Provider: Noah Trinh MD  Primary Care Provider: Pina Jones NP  [facility] Medicine Team: [facility] MED 4 Noah Trinh MD  Primary Care Team: [facility] HOSP [facility] 4    HPI:   23M PMH HIV (on Biktarvy, last CD4 296 from 9/20/23), EtOH use, acute necrotizing pancreatitis, splenic vein thrombosis, asthma, and anemia who presents to ED with abdominal pain, nausea, and vomiting. He was recently discharged on 10/10 after admission for acute necrotizing pancreatitis. His abdominal pain never fully resolved following discharge, and he was sent home with Dilaudid PO 2 mg PRN. Patient woke up early this morning with severe, generalized abdominal pain with radiation to scapula, nausea, and blood-tinged emesis after arriving to the ED.He denies any recent EtOH use since discharge.     He was evaluated by [facility] on previous admission, who recommended against any intervention on the pancreatic collection due to immaturity. He was treated with cefepime (as recommended by ID), which was continued after discharge via midline and patient reports compliance. Recent endoscopy (9/18/23) notable for a small Corinne-Chauhan tear and gastric erythema.     In ED, /91, tachycardic (128), and afebrile. CBC notable for slight leukocytosis (13.01 - increased since discharge) and CMP unremarkable. EKG w/ sinus tachycardia. CXR remarkable for small L pleural effusion. CT A/P similar to prior with lobulated peripancreatic fluid collection and thrombus within splenic vein but patent.        * No surgery found *      Hospital Course:   23 yom with Hiv (CD4 count 1000), ETOH abuse, recent acute necrotizing pancreatitis  was D/C with midline on cefepime presenting with recurrence of symptoms. CT abdomen demonstrated continued inflammation of the pancreas and similar size cyst without concern for abscess at this time, with mild increase in lipase. Pt has been on dilaudid and taking it whenever possible. Fluids continued. GI consulted on admission; deferred acute need for procedure to drain cyst. Attempting to advance diet. Started on bowel regimen given high frequency of dilaudid with bowel movement on 10/16. Regular diet initiated on 10/17 and dilaudid decreased to 1 mg Q3H PRN. Dilaudid continually weaned down. Discharge regimen for chronic pain 2/2 pancreatitis will consists of MS contin 15 mg BID x 2w, then daily x 2w with 15 mg immediate release morphine Q4H for breakthrough pain for 2w. Will continue infusions as outpatient        Goals of Care Treatment Preferences:  Code Status: Full Code      Consults:   Consults (From admission, onward)        Status Ordering Provider     Inpatient consult to [facility]  Once        Provider:  (Not yet assigned)    Completed RODGER MATHIAS          No new Assessment & Plan notes have been filed under this hospital service since the last note was generated.  Service: Hospital Medicine    Final Active Diagnoses:    Diagnosis Date Noted POA    PRINCIPAL PROBLEM:  Chronic pancreatitis [K86.1] 09/20/2023 Yes    Continuous severe abdominal pain [R10.9] 10/19/2023 Yes    SIRS (systemic inflammatory response syndrome) [R65.10] 10/12/2023 Yes    Alcohol use disorder [F10.90] 10/05/2023 Yes    Splenic vein thrombosis [I82.890] 09/20/2023 Yes    Mild intermittent asthma without complication [J45.20] 09/18/2023 Yes     Chronic    Normocytic anemia [D64.9] 09/18/2023 Yes     Chronic    Polycythemia vera [D45] 11/28/2021 Yes    HIV infection [B20] 11/02/2021 Yes      Problems Resolved During this Admission:       Discharged Condition: stable    Disposition: Home or Self  Care    Follow Up:    Patient Instructions:   No discharge procedures on file.    Significant Diagnostic Studies: N/A    Pending Diagnostic Studies:     None         Medications:  Reconciled Home Medications:      Medication List      START taking these medications    dextrose 5 % in water (D5W) PgBk 100 mL with ceFEPIme 2 gram SolR 2 g  Inject 2 g into the vein every 8 (eight) hours.     ELIQUIS 5 mg Tab  Generic drug: apixaban  Take 1 tablet (5 mg total) by mouth 2 (two) times daily.     * morphine 15 MG 12 hr tablet  Commonly known as: MS CONTIN  Take 1 tablet (15 mg total) by mouth 2 (two) times daily for 14 days, THEN 1 tablet (15 mg total) once daily for 14 days.  Start taking on: October 19, 2023     * morphine 15 MG tablet  Commonly known as: MSIR  Take 1 tablet (15 mg total) by mouth every 6 (six) hours as needed for Pain (severe, breakthrough).         * This list has 2 medication(s) that are the same as other medications prescribed for you. Read the directions carefully, and ask your doctor or other care provider to review them with you.            CONTINUE taking these medications    BIKTARVY -25 mg (25 kg or greater)  Generic drug: apurgtica-nfiwsoaz-fgjhkgp ala  Take 1 tablet by mouth once daily.     promethazine 25 MG tablet  Commonly known as: PHENERGAN  Take 25 mg by mouth every 4 (four) hours as needed for Nausea.        STOP taking these medications    HYDROmorphone 2 MG tablet  Commonly known as: DILAUDID            Indwelling Lines/Drains at time of discharge:   Lines/Drains/Airways     None                   Noah Trinh MD  Department of Hospital Medicine  [facility] - Intensive Care ([place])

## 2023-10-19 NOTE — PLAN OF CARE
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       Follow Up Appointments:  Future Appointments   Date Time Provider Department Center   11/9/2023  8:30 AM Parag Bright MD [facility]   11/13/2023  4:30 PM [facility]       Allergies:  Review of patient's allergies indicates:   Allergen Reactions    [place] Swelling    Pcn [penicillins] Hives     Tolerates cephalosporins    Pecan nut Swelling    Soy     Sulfa (sulfonamide antibiotics) Hives       Medications: Review discharge medications with patient and family and provide education.      I have seen and examined this patient within the last 30 days. My clinical findings that support the need for the home health skilled services and home bound status are the following: Weakness/numbness causing balance and gait disturbance due to Weakness/Debility making it taxing to leave home.     Diet:   regular diet    Labs:  SN to perform labs:   Order the following labs to be drawn on Mondays:   CBC  CMP   CRP    Referrals/ Consults  Nursing home infusion services    Activities:   activity as  tolerated    Nursing:   Agency to admit patient within 24 hours of hospital discharge unless specified on physician order or at patient request    SN to complete comprehensive assessment including routine vital signs. Instruct on disease process and s/s of complications to report to MD. Review/verify medication list sent home with the patient at time of discharge  and instruct patient/caregiver as needed. Frequency may be adjusted depending on start of care date.     Skilled nurse to perform up to 3 visits PRN for symptoms related to diagnosis    Notify MD if SBP > 160 or < 90; DBP > 90 or < 50; HR > 120 or < 50; Temp > 101; O2 < 88%;     Ok to schedule additional visits based on staff availability and patient request on consecutive days within the home health episode.    For all post-discharge communication and subsequent orders please contact patient's primary care physician. If unable to reach primary care physician or do not receive response within 30 minutes, please contact [phone] for clinical staff order clarification     Miscellaneous   Home Infusion Therapy:   SN to perform Infusion Therapy/Central Line Care.  Review Central Line Care & Central Line Flush with patient.     Administer (drug and dose): Cefepime 2g @ 200 mL/hr Q8H IV; Last dose given: 10/19/2023         Home dose due: 10/20/2023     1) Infection: Infected pancreatic collection     2) Discharge Antibiotics:     Intravenous antibiotics:  Cefepime 2 g IV q12 hours        3) Therapy Duration:  four weeks     Estimated end date of IV antibiotics: 11/01/2023     4) Outpatient Weekly Labs:     Order the following labs to be drawn on Mondays:   CBC  CMP   CRP     5) Fax Lab Results to Infectious Diseases Provider: Kaila     [facility] Fax Number: [phone]     6) Outpatient Infectious Diseases Follow-up     Follow-up appointment will be arranged by the ID clinic and will be found in the patient's appointments tab.     Prior to discharge,  please ensure the patient's follow-up has been scheduled.    If there is still no follow-up scheduled prior to discharge, please send an EPIC message to Bess Mullins in Infectious Diseases.        Scrub the Hub: Prior to accessing the line, always perform a 30 second alcohol scrub  Each lumen of the central line is to be flushed at least daily with 10 mL Normal Saline and 3 mL Heparin flush (10 units/mL)  Skilled Nurse (SN) may draw blood from IV access  Blood Draw Procedure:              - Aspirate at least 5 mL of blood              - Discard              - Obtain specimen              - Change injection cap              - Flush with 20 mL Normal Saline followed by a                 3-5 mL Heparin flush (10 units/mL)  Central :              - Sterile dressing changes are done weekly and as needed.              - Use chlor-hexadine scrub to cleanse site, apply Biopatch to insertion site,                 apply securement device dressing              - Injection caps are changed weekly and after EVERY lab draw.              - If sterile gauze is under dressing to control oozing,                 dressing change must be performed every 24 hours until gauze is not needed.     Home Health Aide:  Nursing Three times weekly    Wound Care Orders  no     I certify that this patient is confined to his home and needs intermittent skilled nursing care.

## 2023-10-19 NOTE — PLAN OF CARE
CM spoke with pt in room, discussed d/c.  He states he was with [facility] before.  He is not homebound, so won't get HH.  CM called Gisele with [facility], [phone], he states they just need resumption orders.  MD notified.    4:22 PM  Pt states he will Uber home.    RHONDA MartínezN, BS, RN, CCM

## 2023-10-20 NOTE — DISCHARGE SUMMARY
DISCHARGE SUMMARY  Hospital Medicine    Team: [facility]    Patient Name: Tasia Cardona  YOB: 2000    Admit Date: 10/5/2023    Discharge Date: 10/10/2023    Discharge Attending Physician: Brigida Barrow     Admitting Resident    Diagnoses:  Active Hospital Problems    Diagnosis  POA    *Chronic pancreatitis [K86.1]  Yes    Alcohol use disorder [F10.90]  Yes    Pancreatic abscess [K85.90]  Yes    Splenic vein thrombosis [I82.890]  Yes    Polycythemia vera [D45]  Yes    HIV infection [B20]  Yes      Resolved Hospital Problems   No resolved problems to display.       Discharged Condition: admit problems have stabilized         HOSPITAL COURSE:      Initial Presentation:    23-year-old man with a history of HIV on HAART, asthma, polycythemia presents to the emergency department with complaints of uncontrolled abdominal pain diagnosis of necrotizing pancreatitis.     Recent history is that patient was admitted to [facility] [id number], presented with concerns for hematemesis Chauhan tear, post EGD he developed worsening abdominal pain, CT imaging obtained in consistent with acute necrotizing pancreatitis with splenic vein thrombosis.  Hematology consulted and anticoagulation not recommended for splenic vein thrombosis, he received 2 days of empiric IV antibiotic therapy with vancomycin and cefepime, Infectious Disease was consulted.  Prior to discharge patient was tolerating oral diet     He was then admitted to [facility] on September 29th to October 4th.  Presenting symptoms were uncontrolled abdominal pain and fevers CT scan demonstrated sequela of necrotizing pancreatitis with large encapsulating peripancreatic fluid collection.  He was started on empiric meropenem until October 2nd transition to oral ciprofloxacin and Flagyl and discharged with with oral antibiotic prescription.  Patient reported pain persisted he was discharged with oral oxycodone for pain management.     He went home on October  4th approximately around 3:00 p.m. drink only some liquids that evening and the morning of October 5th he had a smoothie, eggs and cinnamon toast for breakfast took a nap around 11:00 a.m. and woke up at 1:00 p.m. with severe abdominal pain in the epigastrium and upper quadrants.  He states while taking shower he felt presyncopal did not fall and sat on the ground, decided to return to the hospital felt he may have been discharged too soon do [facility] hospitalization patient had PETH checked - value @ 682, suspected to be etiology for pancreatitis.  Today patient says he has had no alcohol use since prior to initial September hospitalization.   He smokes cigarettes approx 1 pack every 2-3/weeks.      With repeat vomiting today he did not have any hematemesis.   At discharge on October 4th white count was 11 and today is elevated at 23.  Pain was so severe in the emergency department that he required ketamine 20mg dose to help improve symptoms.      ED Treatment; Cefepime,  Droperidol, LR 1L, NS 1L, Morphine 4mg x 2, Flagyl 500mg, Zofran.   Ketamine 20mg.        Course of Principle Problem for Admission:    Acute necrotizing pancreatitis  Patient with necrotizing pancreatitis and now sequelae of large pseudocyst concerning for infection  -Admitted to [facility] from 9/29-10/04 - received empiric Meropenem until 10/02 then transitioned to Cirpo/Flagyl, on 10/4 WBC was 11 - 1 day from discharge patient presents for readmission and WBC 23.   -BLood cultures obtained   -Empiric Meropenem reordered  Infectious disease consult ordered for restricted abx use  -AES consult ordered - fu in 4-6 weeks for cyst re-assessment.     ID recs:    IV cefepime 2 grams q 12 to complete a 4 week course.       Alcohol use disorder  [place] [facility] [place] with PETH level elevated 680s.  He reports last alcohol use is prior to initial admission with pancreatitis 9/17.     Other Medical Problems Addressed in the Hospital:    ID  HIV  infection  Currently on Biktarvy, has been compliant with medication and receiving it while in patient. Last CD4 count on 9/20 was 296.     Plan  1. Continue on biktarvy.    CONSULTS: ID, AES    Last CBC/BMP/HgbA1c (if applicable):  Recent Results (from the past 336 hour(s))   CBC auto differential    Collection Time: 10/19/23  4:47 AM   Result Value Ref Range    WBC 5.94 3.90 - 12.70 K/uL    Hemoglobin 9.8 (L) 14.0 - 18.0 g/dL    Hematocrit 31.7 (L) 40.0 - 54.0 %    Platelets 295 150 - 450 K/uL   CBC auto differential    Collection Time: 10/18/23  2:16 AM   Result Value Ref Range    WBC 6.85 3.90 - 12.70 K/uL    Hemoglobin 10.1 (L) 14.0 - 18.0 g/dL    Hematocrit 32.8 (L) 40.0 - 54.0 %    Platelets 320 150 - 450 K/uL   CBC auto differential    Collection Time: 10/17/23  5:47 AM   Result Value Ref Range    WBC 5.04 3.90 - 12.70 K/uL    Hemoglobin 10.7 (L) 14.0 - 18.0 g/dL    Hematocrit 34.8 (L) 40.0 - 54.0 %    Platelets 322 150 - 450 K/uL     No results found for this or any previous visit (from the past 336 hour(s)).  Lab Results   Component Value Date    HGBA1C 5.1 10/06/2023       Disposition:   Home with Home Health     Future Scheduled Appointments:  Future Appointments   Date Time Provider Department Center   11/9/2023  8:30 AM Parag Bright MD [facility] ID [address]   11/13/2023  4:30 PM Roxy Daniels MD [facility] HC [facility]       Follow-up Plans from This Hospitalization:  ID, AES    Discharge Medication List:         Medication List        CONTINUE taking these medications      BIKTARVY -25 mg (25 kg or greater)  Generic drug: jxgabvent-hqxfzrzn-dyockwu ala  Take 1 tablet by mouth once daily.            STOP taking these medications      ciprofloxacin HCl 500 MG tablet  Commonly known as: CIPRO     ibuprofen 400 MG tablet  Commonly known as: ADVIL,MOTRIN     metroNIDAZOLE 500 MG tablet  Commonly known as: FLAGYL     nicotine 14 mg/24 hr  Commonly known as: NICODERM CQ     oxyCODONE 5 MG  immediate release tablet  Commonly known as: ROXICODONE     promethazine 25 MG tablet  Commonly known as: PHENERGAN               Where to Get Your Medications        Information about where to get these medications is not yet available    Ask your nurse or doctor about these medications  BIKTARVY -25 mg (25 kg or greater)         Patient Instructions:  Discharge Procedure Orders   Ambulatory referral/consult to Gastroenterology   Standing Status: Future   Referral Priority: Routine Referral Type: Consultation   Referral Reason: Specialty Services Required   Requested Specialty: Gastroenterology   Number of Visits Requested: 1       Signing Physician:  Brigida Barrow MD

## 2023-10-20 NOTE — NURSING
Pt. Meds and IV extension cord arrived. Pt. Will order an Uber, declines wheelchair and Dc'd w/midline for at home antibiotics.

## 2023-10-21 ENCOUNTER — HOSPITAL ENCOUNTER (INPATIENT)
Facility: HOSPITAL | Age: 23
LOS: 19 days | Discharge: HOME | DRG: 438 | End: 2023-11-09
Attending: EMERGENCY MEDICINE | Admitting: STUDENT IN AN ORGANIZED HEALTH CARE EDUCATION/TRAINING PROGRAM
Payer: MEDICAID

## 2023-10-21 DIAGNOSIS — F10.90 ALCOHOL USE DISORDER: ICD-10-CM

## 2023-10-21 DIAGNOSIS — E87.6 HYPOKALEMIA: ICD-10-CM

## 2023-10-21 DIAGNOSIS — R00.0 HEART RATE FAST: ICD-10-CM

## 2023-10-21 DIAGNOSIS — D64.9 NORMOCYTIC ANEMIA: Chronic | ICD-10-CM

## 2023-10-21 DIAGNOSIS — D45 POLYCYTHEMIA VERA: ICD-10-CM

## 2023-10-21 DIAGNOSIS — I82.890 SPLENIC VEIN THROMBOSIS: ICD-10-CM

## 2023-10-21 DIAGNOSIS — K86.2 PANCREATIC PSEUDOCYST/CYST: ICD-10-CM

## 2023-10-21 DIAGNOSIS — K86.1 CHRONIC PANCREATITIS: ICD-10-CM

## 2023-10-21 DIAGNOSIS — J45.20 MILD INTERMITTENT ASTHMA WITHOUT COMPLICATION: Chronic | ICD-10-CM

## 2023-10-21 DIAGNOSIS — R00.0 TACHYCARDIA: ICD-10-CM

## 2023-10-21 DIAGNOSIS — R10.9 CONTINUOUS SEVERE ABDOMINAL PAIN: ICD-10-CM

## 2023-10-21 DIAGNOSIS — R10.9 ABDOMINAL PAIN, UNSPECIFIED ABDOMINAL LOCATION: ICD-10-CM

## 2023-10-21 DIAGNOSIS — K86.1 OTHER CHRONIC PANCREATITIS: Primary | ICD-10-CM

## 2023-10-21 DIAGNOSIS — B20 HIV INFECTION, UNSPECIFIED SYMPTOM STATUS: ICD-10-CM

## 2023-10-21 DIAGNOSIS — K85.91 NECROTIZING PANCREATITIS: ICD-10-CM

## 2023-10-21 DIAGNOSIS — E87.29 INCREASED ANION GAP METABOLIC ACIDOSIS: ICD-10-CM

## 2023-10-21 DIAGNOSIS — T50.905A MEDICATION ADVERSE EFFECT: ICD-10-CM

## 2023-10-21 DIAGNOSIS — K86.89 FLUID COLLECTION OF PANCREAS: ICD-10-CM

## 2023-10-21 DIAGNOSIS — R07.9 CHEST PAIN: ICD-10-CM

## 2023-10-21 DIAGNOSIS — K86.3 PANCREATIC PSEUDOCYST/CYST: ICD-10-CM

## 2023-10-21 LAB
ALBUMIN SERPL BCP-MCNC: 3 G/DL (ref 3.5–5.2)
ALLENS TEST: NORMAL
ALP SERPL-CCNC: 76 U/L (ref 55–135)
ALT SERPL W/O P-5'-P-CCNC: 7 U/L (ref 10–44)
ANION GAP SERPL CALC-SCNC: 13 MMOL/L (ref 8–16)
AST SERPL-CCNC: 13 U/L (ref 10–40)
BASOPHILS # BLD AUTO: 0.05 K/UL (ref 0–0.2)
BASOPHILS NFR BLD: 0.5 % (ref 0–1.9)
BILIRUB SERPL-MCNC: 0.4 MG/DL (ref 0.1–1)
BUN SERPL-MCNC: 5 MG/DL (ref 6–20)
CALCIUM SERPL-MCNC: 8.6 MG/DL (ref 8.7–10.5)
CHLORIDE SERPL-SCNC: 103 MMOL/L (ref 95–110)
CO2 SERPL-SCNC: 21 MMOL/L (ref 23–29)
CREAT SERPL-MCNC: 0.6 MG/DL (ref 0.5–1.4)
DIFFERENTIAL METHOD: ABNORMAL
EOSINOPHIL # BLD AUTO: 0.7 K/UL (ref 0–0.5)
EOSINOPHIL NFR BLD: 8 % (ref 0–8)
ERYTHROCYTE [DISTWIDTH] IN BLOOD BY AUTOMATED COUNT: 18.4 % (ref 11.5–14.5)
EST. GFR  (NO RACE VARIABLE): >60 ML/MIN/1.73 M^2
GLUCOSE SERPL-MCNC: 113 MG/DL (ref 70–110)
HCT VFR BLD AUTO: 31.6 % (ref 40–54)
HGB BLD-MCNC: 10.2 G/DL (ref 14–18)
IMM GRANULOCYTES # BLD AUTO: 0.03 K/UL (ref 0–0.04)
IMM GRANULOCYTES NFR BLD AUTO: 0.3 % (ref 0–0.5)
LDH SERPL L TO P-CCNC: 0.6 MMOL/L (ref 0.5–2.2)
LIPASE SERPL-CCNC: 96 U/L (ref 4–60)
LYMPHOCYTES # BLD AUTO: 2.2 K/UL (ref 1–4.8)
LYMPHOCYTES NFR BLD: 24 % (ref 18–48)
MAGNESIUM SERPL-MCNC: 1.7 MG/DL (ref 1.6–2.6)
MCH RBC QN AUTO: 28.4 PG (ref 27–31)
MCHC RBC AUTO-ENTMCNC: 32.3 G/DL (ref 32–36)
MCV RBC AUTO: 88 FL (ref 82–98)
MONOCYTES # BLD AUTO: 0.8 K/UL (ref 0.3–1)
MONOCYTES NFR BLD: 9 % (ref 4–15)
NEUTROPHILS # BLD AUTO: 5.4 K/UL (ref 1.8–7.7)
NEUTROPHILS NFR BLD: 58.2 % (ref 38–73)
NRBC BLD-RTO: 0 /100 WBC
PLATELET # BLD AUTO: 348 K/UL (ref 150–450)
PMV BLD AUTO: 10.2 FL (ref 9.2–12.9)
POTASSIUM SERPL-SCNC: 3.3 MMOL/L (ref 3.5–5.1)
PROT SERPL-MCNC: 6.8 G/DL (ref 6–8.4)
RBC # BLD AUTO: 3.59 M/UL (ref 4.6–6.2)
SAMPLE: NORMAL
SITE: NORMAL
SODIUM SERPL-SCNC: 137 MMOL/L (ref 136–145)
WBC # BLD AUTO: 9.3 K/UL (ref 3.9–12.7)

## 2023-10-21 PROCEDURE — 99900035 HC TECH TIME PER 15 MIN (STAT)

## 2023-10-21 PROCEDURE — 93010 EKG 12-LEAD: ICD-10-PCS | Mod: ,,, | Performed by: INTERNAL MEDICINE

## 2023-10-21 PROCEDURE — 83735 ASSAY OF MAGNESIUM: CPT | Performed by: PHYSICIAN ASSISTANT

## 2023-10-21 PROCEDURE — 12000002 HC ACUTE/MED SURGE SEMI-PRIVATE ROOM

## 2023-10-21 PROCEDURE — 83690 ASSAY OF LIPASE: CPT | Performed by: PHYSICIAN ASSISTANT

## 2023-10-21 PROCEDURE — 83930 ASSAY OF BLOOD OSMOLALITY: CPT | Performed by: PHYSICIAN ASSISTANT

## 2023-10-21 PROCEDURE — 83605 ASSAY OF LACTIC ACID: CPT

## 2023-10-21 PROCEDURE — 82803 BLOOD GASES ANY COMBINATION: CPT

## 2023-10-21 PROCEDURE — 80047 BASIC METABLC PNL IONIZED CA: CPT

## 2023-10-21 PROCEDURE — 63600175 PHARM REV CODE 636 W HCPCS: Performed by: PHYSICIAN ASSISTANT

## 2023-10-21 PROCEDURE — 99285 EMERGENCY DEPT VISIT HI MDM: CPT | Mod: 25

## 2023-10-21 PROCEDURE — 96375 TX/PRO/DX INJ NEW DRUG ADDON: CPT

## 2023-10-21 PROCEDURE — 80053 COMPREHEN METABOLIC PANEL: CPT | Performed by: PHYSICIAN ASSISTANT

## 2023-10-21 PROCEDURE — 85025 COMPLETE CBC W/AUTO DIFF WBC: CPT | Performed by: PHYSICIAN ASSISTANT

## 2023-10-21 PROCEDURE — 96374 THER/PROPH/DIAG INJ IV PUSH: CPT | Mod: 59

## 2023-10-21 PROCEDURE — 93010 ELECTROCARDIOGRAM REPORT: CPT | Mod: ,,, | Performed by: INTERNAL MEDICINE

## 2023-10-21 PROCEDURE — 93005 ELECTROCARDIOGRAM TRACING: CPT

## 2023-10-21 RX ORDER — HYDROMORPHONE HYDROCHLORIDE 1 MG/ML
2 INJECTION, SOLUTION INTRAMUSCULAR; INTRAVENOUS; SUBCUTANEOUS
Status: COMPLETED | OUTPATIENT
Start: 2023-10-21 | End: 2023-10-21

## 2023-10-21 RX ORDER — PROCHLORPERAZINE EDISYLATE 5 MG/ML
5 INJECTION INTRAMUSCULAR; INTRAVENOUS
Status: COMPLETED | OUTPATIENT
Start: 2023-10-21 | End: 2023-10-21

## 2023-10-21 RX ADMIN — HYDROMORPHONE HYDROCHLORIDE 2 MG: 1 INJECTION, SOLUTION INTRAMUSCULAR; INTRAVENOUS; SUBCUTANEOUS at 10:10

## 2023-10-21 RX ADMIN — PROCHLORPERAZINE EDISYLATE 5 MG: 5 INJECTION INTRAMUSCULAR; INTRAVENOUS at 10:10

## 2023-10-22 PROBLEM — E87.6 HYPOKALEMIA: Status: ACTIVE | Noted: 2023-10-22

## 2023-10-22 PROBLEM — E87.20 NORMAL ANION GAP METABOLIC ACIDOSIS: Status: ACTIVE | Noted: 2023-10-22

## 2023-10-22 PROBLEM — E87.20 NORMAL ANION GAP METABOLIC ACIDOSIS: Status: RESOLVED | Noted: 2023-10-22 | Resolved: 2023-10-22

## 2023-10-22 PROBLEM — E87.29 INCREASED ANION GAP METABOLIC ACIDOSIS: Status: ACTIVE | Noted: 2023-10-22

## 2023-10-22 PROBLEM — R65.10 SIRS (SYSTEMIC INFLAMMATORY RESPONSE SYNDROME): Status: RESOLVED | Noted: 2023-10-12 | Resolved: 2023-10-22

## 2023-10-22 LAB
ALBUMIN SERPL BCP-MCNC: 3.1 G/DL (ref 3.5–5.2)
ALP SERPL-CCNC: 77 U/L (ref 55–135)
ALT SERPL W/O P-5'-P-CCNC: 9 U/L (ref 10–44)
ANION GAP SERPL CALC-SCNC: 13 MMOL/L (ref 8–16)
AST SERPL-CCNC: 20 U/L (ref 10–40)
BASOPHILS # BLD AUTO: 0.05 K/UL (ref 0–0.2)
BASOPHILS NFR BLD: 0.6 % (ref 0–1.9)
BILIRUB SERPL-MCNC: 0.4 MG/DL (ref 0.1–1)
BUN SERPL-MCNC: 4 MG/DL (ref 6–20)
CALCIUM SERPL-MCNC: 8.8 MG/DL (ref 8.7–10.5)
CHLORIDE SERPL-SCNC: 102 MMOL/L (ref 95–110)
CO2 SERPL-SCNC: 22 MMOL/L (ref 23–29)
CREAT SERPL-MCNC: 0.6 MG/DL (ref 0.5–1.4)
DIFFERENTIAL METHOD: ABNORMAL
EOSINOPHIL # BLD AUTO: 0.8 K/UL (ref 0–0.5)
EOSINOPHIL NFR BLD: 8.7 % (ref 0–8)
ERYTHROCYTE [DISTWIDTH] IN BLOOD BY AUTOMATED COUNT: 18.8 % (ref 11.5–14.5)
EST. GFR  (NO RACE VARIABLE): >60 ML/MIN/1.73 M^2
GLUCOSE SERPL-MCNC: 77 MG/DL (ref 70–110)
HCT VFR BLD AUTO: 34 % (ref 40–54)
HGB BLD-MCNC: 10.5 G/DL (ref 14–18)
IMM GRANULOCYTES # BLD AUTO: 0.03 K/UL (ref 0–0.04)
IMM GRANULOCYTES NFR BLD AUTO: 0.3 % (ref 0–0.5)
LYMPHOCYTES # BLD AUTO: 2.8 K/UL (ref 1–4.8)
LYMPHOCYTES NFR BLD: 31 % (ref 18–48)
MAGNESIUM SERPL-MCNC: 1.9 MG/DL (ref 1.6–2.6)
MCH RBC QN AUTO: 27.9 PG (ref 27–31)
MCHC RBC AUTO-ENTMCNC: 30.9 G/DL (ref 32–36)
MCV RBC AUTO: 90 FL (ref 82–98)
MONOCYTES # BLD AUTO: 0.9 K/UL (ref 0.3–1)
MONOCYTES NFR BLD: 9.8 % (ref 4–15)
NEUTROPHILS # BLD AUTO: 4.5 K/UL (ref 1.8–7.7)
NEUTROPHILS NFR BLD: 49.6 % (ref 38–73)
NRBC BLD-RTO: 0 /100 WBC
OSMOLALITY SERPL: 288 MOSM/KG (ref 280–300)
PHOSPHATE SERPL-MCNC: 4.5 MG/DL (ref 2.7–4.5)
PLATELET # BLD AUTO: 326 K/UL (ref 150–450)
PMV BLD AUTO: 10.6 FL (ref 9.2–12.9)
POTASSIUM SERPL-SCNC: 3.9 MMOL/L (ref 3.5–5.1)
PROT SERPL-MCNC: 7 G/DL (ref 6–8.4)
RBC # BLD AUTO: 3.76 M/UL (ref 4.6–6.2)
SODIUM SERPL-SCNC: 137 MMOL/L (ref 136–145)
WBC # BLD AUTO: 9.07 K/UL (ref 3.9–12.7)

## 2023-10-22 PROCEDURE — 63600175 PHARM REV CODE 636 W HCPCS: Performed by: PHYSICIAN ASSISTANT

## 2023-10-22 PROCEDURE — 63600175 PHARM REV CODE 636 W HCPCS: Performed by: INTERNAL MEDICINE

## 2023-10-22 PROCEDURE — 25000003 PHARM REV CODE 250

## 2023-10-22 PROCEDURE — 96365 THER/PROPH/DIAG IV INF INIT: CPT

## 2023-10-22 PROCEDURE — 36415 COLL VENOUS BLD VENIPUNCTURE: CPT

## 2023-10-22 PROCEDURE — 63600175 PHARM REV CODE 636 W HCPCS

## 2023-10-22 PROCEDURE — 84100 ASSAY OF PHOSPHORUS: CPT

## 2023-10-22 PROCEDURE — 25000003 PHARM REV CODE 250: Performed by: PHYSICIAN ASSISTANT

## 2023-10-22 PROCEDURE — 85025 COMPLETE CBC W/AUTO DIFF WBC: CPT

## 2023-10-22 PROCEDURE — 99223 PR INITIAL HOSPITAL CARE,LEVL III: ICD-10-PCS | Mod: ,,, | Performed by: INTERNAL MEDICINE

## 2023-10-22 PROCEDURE — 25000003 PHARM REV CODE 250: Performed by: INTERNAL MEDICINE

## 2023-10-22 PROCEDURE — 63600175 PHARM REV CODE 636 W HCPCS: Performed by: STUDENT IN AN ORGANIZED HEALTH CARE EDUCATION/TRAINING PROGRAM

## 2023-10-22 PROCEDURE — 99223 1ST HOSP IP/OBS HIGH 75: CPT | Mod: ,,, | Performed by: INTERNAL MEDICINE

## 2023-10-22 PROCEDURE — 80053 COMPREHEN METABOLIC PANEL: CPT

## 2023-10-22 PROCEDURE — 99223 1ST HOSP IP/OBS HIGH 75: CPT | Mod: 25,,, | Performed by: INTERNAL MEDICINE

## 2023-10-22 PROCEDURE — 99223 PR INITIAL HOSPITAL CARE,LEVL III: ICD-10-PCS | Mod: 25,,, | Performed by: INTERNAL MEDICINE

## 2023-10-22 PROCEDURE — 12000002 HC ACUTE/MED SURGE SEMI-PRIVATE ROOM

## 2023-10-22 PROCEDURE — 83735 ASSAY OF MAGNESIUM: CPT

## 2023-10-22 PROCEDURE — 25500020 PHARM REV CODE 255: Performed by: EMERGENCY MEDICINE

## 2023-10-22 RX ORDER — GLUCAGON 1 MG
1 KIT INJECTION
Status: DISCONTINUED | OUTPATIENT
Start: 2023-10-22 | End: 2023-11-10 | Stop reason: HOSPADM

## 2023-10-22 RX ORDER — HYDROMORPHONE HYDROCHLORIDE 1 MG/ML
1 INJECTION, SOLUTION INTRAMUSCULAR; INTRAVENOUS; SUBCUTANEOUS
Status: DISCONTINUED | OUTPATIENT
Start: 2023-10-22 | End: 2023-10-22

## 2023-10-22 RX ORDER — MORPHINE SULFATE 15 MG/1
15 TABLET ORAL EVERY 4 HOURS PRN
Status: DISCONTINUED | OUTPATIENT
Start: 2023-10-22 | End: 2023-10-22

## 2023-10-22 RX ORDER — TALC
6 POWDER (GRAM) TOPICAL NIGHTLY PRN
Status: DISCONTINUED | OUTPATIENT
Start: 2023-10-22 | End: 2023-11-10 | Stop reason: HOSPADM

## 2023-10-22 RX ORDER — SODIUM CHLORIDE 0.9 % (FLUSH) 0.9 %
10 SYRINGE (ML) INJECTION
Status: DISCONTINUED | OUTPATIENT
Start: 2023-10-22 | End: 2023-11-10 | Stop reason: HOSPADM

## 2023-10-22 RX ORDER — HYDROMORPHONE HYDROCHLORIDE 1 MG/ML
0.2 INJECTION, SOLUTION INTRAMUSCULAR; INTRAVENOUS; SUBCUTANEOUS EVERY 6 HOURS PRN
Status: DISCONTINUED | OUTPATIENT
Start: 2023-10-22 | End: 2023-10-22

## 2023-10-22 RX ORDER — KETOROLAC TROMETHAMINE 30 MG/ML
30 INJECTION, SOLUTION INTRAMUSCULAR; INTRAVENOUS 2 TIMES DAILY
Status: COMPLETED | OUTPATIENT
Start: 2023-10-22 | End: 2023-10-24

## 2023-10-22 RX ORDER — MORPHINE SULFATE 15 MG/1
15 TABLET, FILM COATED, EXTENDED RELEASE ORAL EVERY 12 HOURS
Status: DISCONTINUED | OUTPATIENT
Start: 2023-10-22 | End: 2023-10-22

## 2023-10-22 RX ORDER — ACETAMINOPHEN 500 MG
1000 TABLET ORAL EVERY 8 HOURS
Status: DISCONTINUED | OUTPATIENT
Start: 2023-10-22 | End: 2023-11-04

## 2023-10-22 RX ORDER — IBUPROFEN 200 MG
24 TABLET ORAL
Status: DISCONTINUED | OUTPATIENT
Start: 2023-10-22 | End: 2023-11-10 | Stop reason: HOSPADM

## 2023-10-22 RX ORDER — HYDROMORPHONE HYDROCHLORIDE 1 MG/ML
1 INJECTION, SOLUTION INTRAMUSCULAR; INTRAVENOUS; SUBCUTANEOUS EVERY 6 HOURS PRN
Status: DISCONTINUED | OUTPATIENT
Start: 2023-10-22 | End: 2023-10-22

## 2023-10-22 RX ORDER — KETOROLAC TROMETHAMINE 30 MG/ML
30 INJECTION, SOLUTION INTRAMUSCULAR; INTRAVENOUS 2 TIMES DAILY
Status: DISCONTINUED | OUTPATIENT
Start: 2023-10-22 | End: 2023-10-22

## 2023-10-22 RX ORDER — IBUPROFEN 200 MG
400 TABLET ORAL EVERY 6 HOURS
Status: DISCONTINUED | OUTPATIENT
Start: 2023-10-22 | End: 2023-10-22

## 2023-10-22 RX ORDER — HYDROMORPHONE HYDROCHLORIDE 1 MG/ML
1 INJECTION, SOLUTION INTRAMUSCULAR; INTRAVENOUS; SUBCUTANEOUS
Status: COMPLETED | OUTPATIENT
Start: 2023-10-22 | End: 2023-10-22

## 2023-10-22 RX ORDER — ONDANSETRON 4 MG/1
4 TABLET, ORALLY DISINTEGRATING ORAL 2 TIMES DAILY
Status: DISCONTINUED | OUTPATIENT
Start: 2023-10-22 | End: 2023-10-25

## 2023-10-22 RX ORDER — PROMETHAZINE HYDROCHLORIDE 12.5 MG/1
12.5 TABLET ORAL EVERY 6 HOURS PRN
Status: DISCONTINUED | OUTPATIENT
Start: 2023-10-22 | End: 2023-10-22

## 2023-10-22 RX ORDER — MUPIROCIN 20 MG/G
OINTMENT TOPICAL 2 TIMES DAILY
Status: DISPENSED | OUTPATIENT
Start: 2023-10-22 | End: 2023-10-27

## 2023-10-22 RX ORDER — AMOXICILLIN 250 MG
1 CAPSULE ORAL 2 TIMES DAILY PRN
Status: DISCONTINUED | OUTPATIENT
Start: 2023-10-22 | End: 2023-10-25

## 2023-10-22 RX ORDER — PROCHLORPERAZINE EDISYLATE 5 MG/ML
5 INJECTION INTRAMUSCULAR; INTRAVENOUS EVERY 6 HOURS PRN
Status: DISCONTINUED | OUTPATIENT
Start: 2023-10-22 | End: 2023-10-22

## 2023-10-22 RX ORDER — FOLIC ACID 1 MG/1
1 TABLET ORAL DAILY
Status: DISCONTINUED | OUTPATIENT
Start: 2023-10-22 | End: 2023-11-10 | Stop reason: HOSPADM

## 2023-10-22 RX ORDER — POTASSIUM CHLORIDE 7.45 MG/ML
10 INJECTION INTRAVENOUS
Status: DISPENSED | OUTPATIENT
Start: 2023-10-22 | End: 2023-10-22

## 2023-10-22 RX ORDER — IBUPROFEN 200 MG
16 TABLET ORAL
Status: DISCONTINUED | OUTPATIENT
Start: 2023-10-22 | End: 2023-11-10 | Stop reason: HOSPADM

## 2023-10-22 RX ORDER — MORPHINE SULFATE 15 MG/1
15 TABLET, FILM COATED, EXTENDED RELEASE ORAL EVERY 12 HOURS
Status: DISCONTINUED | OUTPATIENT
Start: 2023-10-22 | End: 2023-10-31

## 2023-10-22 RX ORDER — HYDROMORPHONE HYDROCHLORIDE 2 MG/ML
2 INJECTION, SOLUTION INTRAMUSCULAR; INTRAVENOUS; SUBCUTANEOUS EVERY 6 HOURS PRN
Status: DISCONTINUED | OUTPATIENT
Start: 2023-10-22 | End: 2023-10-24

## 2023-10-22 RX ORDER — DEXTROSE MONOHYDRATE, SODIUM CHLORIDE, AND POTASSIUM CHLORIDE 50; 1.49; 4.5 G/1000ML; G/1000ML; G/1000ML
INJECTION, SOLUTION INTRAVENOUS CONTINUOUS
Status: DISPENSED | OUTPATIENT
Start: 2023-10-22 | End: 2023-10-23

## 2023-10-22 RX ORDER — NALOXONE HCL 0.4 MG/ML
0.02 VIAL (ML) INJECTION
Status: DISCONTINUED | OUTPATIENT
Start: 2023-10-22 | End: 2023-11-06

## 2023-10-22 RX ORDER — ONDANSETRON 2 MG/ML
4 INJECTION INTRAMUSCULAR; INTRAVENOUS EVERY 8 HOURS PRN
Status: DISCONTINUED | OUTPATIENT
Start: 2023-10-22 | End: 2023-11-10 | Stop reason: HOSPADM

## 2023-10-22 RX ORDER — PANTOPRAZOLE SODIUM 40 MG/1
40 TABLET, DELAYED RELEASE ORAL 2 TIMES DAILY
Status: DISCONTINUED | OUTPATIENT
Start: 2023-10-22 | End: 2023-11-10 | Stop reason: HOSPADM

## 2023-10-22 RX ORDER — SODIUM CHLORIDE 0.9 % (FLUSH) 0.9 %
10 SYRINGE (ML) INJECTION EVERY 12 HOURS PRN
Status: DISCONTINUED | OUTPATIENT
Start: 2023-10-22 | End: 2023-11-10 | Stop reason: HOSPADM

## 2023-10-22 RX ORDER — POLYETHYLENE GLYCOL 3350 17 G/17G
17 POWDER, FOR SOLUTION ORAL 2 TIMES DAILY PRN
Status: DISCONTINUED | OUTPATIENT
Start: 2023-10-22 | End: 2023-10-26

## 2023-10-22 RX ADMIN — HYDROMORPHONE HYDROCHLORIDE 1 MG: 1 INJECTION, SOLUTION INTRAMUSCULAR; INTRAVENOUS; SUBCUTANEOUS at 10:10

## 2023-10-22 RX ADMIN — PROMETHAZINE HYDROCHLORIDE 6.25 MG: 25 INJECTION INTRAMUSCULAR; INTRAVENOUS at 05:10

## 2023-10-22 RX ADMIN — CEFEPIME 2 G: 2 INJECTION, POWDER, FOR SOLUTION INTRAVENOUS at 04:10

## 2023-10-22 RX ADMIN — PANTOPRAZOLE SODIUM 40 MG: 40 TABLET, DELAYED RELEASE ORAL at 08:10

## 2023-10-22 RX ADMIN — HYDROMORPHONE HYDROCHLORIDE 1 MG: 1 INJECTION, SOLUTION INTRAMUSCULAR; INTRAVENOUS; SUBCUTANEOUS at 04:10

## 2023-10-22 RX ADMIN — HYDROMORPHONE HYDROCHLORIDE 1 MG: 1 INJECTION, SOLUTION INTRAMUSCULAR; INTRAVENOUS; SUBCUTANEOUS at 02:10

## 2023-10-22 RX ADMIN — POTASSIUM CHLORIDE 10 MEQ: 7.46 INJECTION, SOLUTION INTRAVENOUS at 10:10

## 2023-10-22 RX ADMIN — HYDROMORPHONE HYDROCHLORIDE 1 MG: 1 INJECTION, SOLUTION INTRAMUSCULAR; INTRAVENOUS; SUBCUTANEOUS at 12:10

## 2023-10-22 RX ADMIN — Medication 6 MG: at 10:10

## 2023-10-22 RX ADMIN — CEFEPIME 2 G: 2 INJECTION, POWDER, FOR SOLUTION INTRAVENOUS at 09:10

## 2023-10-22 RX ADMIN — IOHEXOL 75 ML: 350 INJECTION, SOLUTION INTRAVENOUS at 12:10

## 2023-10-22 RX ADMIN — HYDROMORPHONE HYDROCHLORIDE 2 MG: 2 INJECTION, SOLUTION INTRAMUSCULAR; INTRAVENOUS; SUBCUTANEOUS at 10:10

## 2023-10-22 RX ADMIN — POTASSIUM CHLORIDE 10 MEQ: 7.46 INJECTION, SOLUTION INTRAVENOUS at 06:10

## 2023-10-22 RX ADMIN — CEFEPIME 2 G: 2 INJECTION, POWDER, FOR SOLUTION INTRAVENOUS at 12:10

## 2023-10-22 RX ADMIN — MORPHINE SULFATE 15 MG: 15 TABLET, EXTENDED RELEASE ORAL at 08:10

## 2023-10-22 RX ADMIN — ACETAMINOPHEN 1000 MG: 500 TABLET ORAL at 10:10

## 2023-10-22 RX ADMIN — POTASSIUM CHLORIDE, DEXTROSE MONOHYDRATE AND SODIUM CHLORIDE: 150; 5; 450 INJECTION, SOLUTION INTRAVENOUS at 06:10

## 2023-10-22 RX ADMIN — HYDROMORPHONE HYDROCHLORIDE 1 MG: 1 INJECTION, SOLUTION INTRAMUSCULAR; INTRAVENOUS; SUBCUTANEOUS at 01:10

## 2023-10-22 RX ADMIN — HYDROMORPHONE HYDROCHLORIDE 2 MG: 2 INJECTION, SOLUTION INTRAMUSCULAR; INTRAVENOUS; SUBCUTANEOUS at 04:10

## 2023-10-22 RX ADMIN — PROMETHAZINE HYDROCHLORIDE 6.25 MG: 25 INJECTION INTRAMUSCULAR; INTRAVENOUS at 04:10

## 2023-10-22 RX ADMIN — PANTOPRAZOLE SODIUM 40 MG: 40 TABLET, DELAYED RELEASE ORAL at 09:10

## 2023-10-22 RX ADMIN — KETOROLAC TROMETHAMINE 30 MG: 30 INJECTION INTRAMUSCULAR; INTRAVENOUS at 09:10

## 2023-10-22 RX ADMIN — MORPHINE SULFATE 15 MG: 15 TABLET, EXTENDED RELEASE ORAL at 09:10

## 2023-10-22 RX ADMIN — ACETAMINOPHEN 1000 MG: 500 TABLET ORAL at 01:10

## 2023-10-22 RX ADMIN — ONDANSETRON 4 MG: 2 INJECTION INTRAMUSCULAR; INTRAVENOUS at 01:10

## 2023-10-22 RX ADMIN — ONDANSETRON 4 MG: 4 TABLET, ORALLY DISINTEGRATING ORAL at 09:10

## 2023-10-22 RX ADMIN — SODIUM CHLORIDE 500 ML: 9 INJECTION, SOLUTION INTRAVENOUS at 05:10

## 2023-10-22 RX ADMIN — POTASSIUM CHLORIDE 10 MEQ: 7.46 INJECTION, SOLUTION INTRAVENOUS at 03:10

## 2023-10-22 RX ADMIN — KETOROLAC TROMETHAMINE 30 MG: 30 INJECTION INTRAMUSCULAR; INTRAVENOUS at 08:10

## 2023-10-22 RX ADMIN — BICTEGRAVIR SODIUM, EMTRICITABINE, AND TENOFOVIR ALAFENAMIDE FUMARATE 1 TABLET: 50; 200; 25 TABLET ORAL at 09:10

## 2023-10-22 RX ADMIN — FOLIC ACID 1 MG: 1 TABLET ORAL at 09:10

## 2023-10-22 NOTE — CONSULTS
[facility]  AES  Consult Note    Patient Name: Tasia Cardona  MRN: [id number]  Admission Date: 10/21/2023  Hospital Length of Stay: 0 days  Code Status: Full Code   Attending Provider: Srini Mtz MD   Consulting Provider: India Youngblood DO  Primary Care Physician: Pina Jones NP  Principal Problem:Other chronic pancreatitis    Inpatient consult to [facility] ([facility])  Consult performed by: India Youngblood DO  Consult ordered by: Noah Trinh MD        Subjective:     HPI: Tasia Cardona is a 23 y.o. male with HIV on Biktarvy, pancreatitis (first diagnosed on 9/20), PCV w/ splenic vein thrombosis (on Eliquis) and asthma that re-presented to [facility] on 10/21 with similar complaints of nausea, vomiting and epigastric abdominal pain of 2 days in duration suspected to be related to his pancreatic fluid collections. He has had multiple similar admissions for the same. He is typically treated with fluids and pain medications and discharged after his symptoms quell. He was doing well following his most recent discharge, but then woke up on 10/21 with fairly severe n/v and epigastric abdominal pain similar to prior. He denies any alcohol use recently. He reports eating normally since his most recent discharge. Last ate a grilled cheese on 10/19 for dinner. Last BM was loose and oily feeling, but normal in color. On my evaluation, he currently denies any chest pain, n/v, diarrhea or constipation. He does report ongoing abdominal pain for which he requests more frequent medications.     On arrival, he was afebrile and tachycardic to the 130s. Labs were notable for K 3.3, Bicarb 21, BUN 5, Alb 3.0, Albumin 3.1 and  Lipase 96. Lactate WNL. AES consulted for possible endoscopic treatment.    10/21/23- CT A/P with contrast- Stable lobulated peripancreatic fluid collection with similar inflammation and edema about the pancreatic tail.  10/12/23- CT A/P with contrast- Grossly stable  lobulated peripancreatic fluid collection as compared to examination 10/05/2023. There is mild inflammation about the pancreatic tail, somewhat decreased since the previous examination.  10/5/23- CT A/P with contrast- Interval development of peripancreatic fluid collections about the tail and body, which may be contiguous with one another.  The largest inferior component of this collection extends anteriorly and measures 7.4 x 6.5 cm and the largest superior component measures 6.5 x 3.3 cm.These collections appear partially encapsulated and suggest pseudocysts.    Past Medical History:   Diagnosis Date    Acute necrotizing pancreatitis 09/20/2023    Alcohol use disorder 10/05/2023    Asthma     Hepatitis C antibody positive in blood 09/18/2023 9/2023 PCR negative    HIV infection 10/2021    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/2023    Normocytic anemia 09/18/2023    Polycythemia vera 11/28/2021    Receives phlebotomy if Hct>45    Positive CMV IgG serology 09/21/2023    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       Past Surgical History:   Procedure Laterality Date    ESOPHAGOGASTRODUODENOSCOPY N/A 9/18/2023    Procedure: EGD (ESOPHAGOGASTRODUODENOSCOPY);  Surgeon: Kg Cleaning MD;  Location: [address]);  Service: Endoscopy;  Laterality: N/A;       Family History   Problem Relation Age of Onset    Pancreatitis Mother     Cancer Mother     Ovarian cancer Mother     No Known Problems Father     Cancer Brother     Breast cancer Maternal Grandmother        Social History     Socioeconomic History    Marital status: Single   Tobacco Use    Smoking status: Some Days     Types: Cigarettes    Smokeless tobacco: Never   Substance and Sexual Activity    Alcohol use: Yes    Drug use: Never     Social Determinants of Health     Financial Resource Strain: Low Risk  (9/18/2023)    Overall Financial Resource Strain (CARDIA)     Difficulty of Paying Living Expenses: Not hard at all   Food Insecurity: No Food Insecurity (9/18/2023)     Hunger Vital Sign     Worried About Running Out of Food in the Last Year: Never true     Ran Out of Food in the Last Year: Never true   Transportation Needs: No Transportation Needs (9/18/2023)    PRAPARE - Transportation     Lack of Transportation (Medical): No     Lack of Transportation (Non-Medical): No   Physical Activity: Inactive (9/18/2023)    Exercise Vital Sign     Days of Exercise per Week: 0 days     Minutes of Exercise per Session: 0 min   Stress: Stress Concern Present (9/18/2023)    [place] [employer] - Occupational Stress Questionnaire     Feeling of Stress : To some extent   Social Connections: Socially Isolated (9/18/2023)    Social Connection and Isolation Panel [NHANES]     Frequency of Communication with Friends and Family: More than three times a week     Frequency of Social Gatherings with Friends and Family: Once a week     Attends Restoration Services: Never     Active Member of Clubs or Organizations: No     Attends Club or Organization Meetings: Never     Marital Status: Never    Housing Stability: Low Risk  (9/18/2023)    Housing Stability Vital Sign     Unable to Pay for Housing in the Last Year: No     Number of Places Lived in the Last Year: 1     Unstable Housing in the Last Year: No       No current facility-administered medications on file prior to encounter.     Current Outpatient Medications on File Prior to Encounter   Medication Sig Dispense Refill    morphine (MS CONTIN) 15 MG 12 hr tablet Take 1 tablet (15 mg total) by mouth 2 (two) times daily for 14 days, THEN 1 tablet (15 mg total) once daily for 14 days. 42 tablet 0    morphine (MSIR) 15 MG tablet Take 1 tablet (15 mg total) by mouth every 6 (six) hours as needed for Pain (severe, breakthrough). 42 tablet 0    apixaban (ELIQUIS) 5 mg Tab Take 1 tablet (5 mg total) by mouth 2 (two) times daily. 60 tablet 0    gdohkjzvp-dpmsvjlt-gigjxes ala (BIKTARVY) -25 mg (25 kg or greater) Take 1 tablet by  mouth once daily.      dextrose 5 % in water (D5W) PgBk 100 mL with ceFEPIme 2 gram SolR 2 g Inject 2 g into the vein every 8 (eight) hours.      promethazine (PHENERGAN) 25 MG tablet Take 25 mg by mouth every 4 (four) hours as needed for Nausea.         Review of patient's allergies indicates:   Allergen Reactions    [place] Swelling    Pcn [penicillins] Hives     Tolerates cephalosporins    Pecan nut Swelling    Soy     Sulfa (sulfonamide antibiotics) Hives       ROS     Objective:     Vitals:    10/22/23 1012   BP:    Pulse:    Resp: 18   Temp:      Constitutional:  not in acute distress  HENT: Head: Normal, normocephalic, atraumatic.  Eyes: conjunctiva clear and sclera nonicteric  Cardiovascular: regular rate and rhythm  Respiratory: normal chest expansion & respiratory effort   and no accessory muscle use  GI: soft, nondistended, tenderness moderate in the epigastrium, without guarding, and without rebound  Musculoskeletal: no muscular tenderness noted  Skin: normal color  Neurological: alert, oriented x3  Psychiatric: mood and affect are within normal limits    Significant Labs:  Recent Labs   Lab 10/19/23  0447 10/21/23  2321 10/22/23  0314   HGB 9.8* 10.2* 10.5*       Lab Results   Component Value Date    WBC 9.07 10/22/2023    HGB 10.5 (L) 10/22/2023    HCT 34.0 (L) 10/22/2023    MCV 90 10/22/2023     10/22/2023       Lab Results   Component Value Date     10/22/2023    K 3.9 10/22/2023     10/22/2023    CO2 22 (L) 10/22/2023    BUN 4 (L) 10/22/2023    CREATININE 0.6 10/22/2023    CALCIUM 8.8 10/22/2023    ANIONGAP 13 10/22/2023    ESTGFRAFRICA >60.0 07/27/2022    EGFRNONAA >60.0 07/27/2022       Lab Results   Component Value Date    ALT 9 (L) 10/22/2023    AST 20 10/22/2023    GGT 61 (H) 10/06/2023    ALKPHOS 77 10/22/2023    BILITOT 0.4 10/22/2023       Lab Results   Component Value Date    INR 1.1 10/06/2023    INR 1.1 09/17/2023       Significant Imaging:  Reviewed pertinent radiology  findings.       Assessment/Plan:     Tasia Cardona is a 23 y.o. male with HIV on Biktarvy, pancreatitis (first diagnosed on 9/20), PCV w/ splenic vein thrombosis (on Eliquis) and asthma that [place] [facility] [place] with similar complaints of nausea, vomiting and epigastric abdominal pain of 2 days in duration suspected to be related to his pancreatic fluid collections. AES consulted for possible endoscopic treatment.     10/21/23- CT A/P with contrast- Stable lobulated peripancreatic fluid collection with similar inflammation and edema about the pancreatic tail.  10/12/23- CT A/P with contrast- Grossly stable lobulated peripancreatic fluid collection as compared to examination 10/05/2023. There is mild inflammation about the pancreatic tail, somewhat decreased since the previous examination.  10/5/23- CT A/P with contrast- Interval development of peripancreatic fluid collections about the tail and body, which may be contiguous with one another.  The largest inferior component of this collection extends anteriorly and measures 7.4 x 6.5 cm and the largest superior component measures 6.5 x 3.3 cm.These collections appear partially encapsulated and suggest pseudocysts.    Problem List:  Pancreatic Fluid Collections     Recommendations:  - Will pan for EUS +/- ERCP on 10/23   - Diet NPO at midnight   - Please continue to hold Eliquis; last dose was am of 10/21     Thank you for involving us in the care of Tasia Cardona. Please call with any additional questions, concerns or changes in the patient's clinical status. We will continue to follow.     India Youngblood DO  Gastroenterology Fellow PGY- IV  [facility]

## 2023-10-22 NOTE — ASSESSMENT & PLAN NOTE
"This patient in known to have HIV+ status (Have detected HIV PCR but never CD4 <200 or AIDS defining illness). Labs reviewed- No results found for: "CD4", No results found for: "HIVDNAPCR". Patient is on HAART. Will continue HAART. Continue to monitor routine labs. Last CD4 count was   Lab Results   Component Value Date    ABSOLUTECD4 1080 10/12/2023     - Continue home Biktarvy     "

## 2023-10-22 NOTE — ASSESSMENT & PLAN NOTE
Likely 2/2 to pancreatitis; Discharged home on Morphine taper which was working well until yesterday morning    - See Chronic Pancreatitis

## 2023-10-22 NOTE — ED PROVIDER NOTES
Encounter Date: 10/21/2023       History     Chief Complaint   Patient presents with    Abdominal Pain     C/o abdominal pain starting this am. Hx chronic pancreatitis     The history is provided by the patient and medical records. No  was used.     Tasia Cardona is a 23 y.o. 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     Reports recurrent abdominal pain for the past month. Locates pain to L middle abdomen. He was discharged 2 days ago after admission for necrotizing pancreatitis. Reports pain has been worsening since returning home despite taking MSIR and MS Contin. He has been compliant with his Cefepime infusions and is due for his evening dose. He began vomiting today which prompted his ED visit. Last BM yesterday and normal. Compliant with his antivirals. He has a developing peripancreatic fluid collection that GI is waiting to mature before draining.     Review of patient's allergies indicates:   Allergen Reactions    [place] Swelling    Pcn [penicillins] Hives     Tolerates cephalosporins    Pecan nut Swelling    Soy     Sulfa (sulfonamide antibiotics) Hives     Past Medical History:   Diagnosis Date    Acute necrotizing pancreatitis 09/20/2023    Alcohol use disorder 10/05/2023    Asthma     Hepatitis C antibody positive in blood 09/18/2023 9/2023 PCR negative    HIV infection 10/2021    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/2023    Normocytic anemia 09/18/2023    Polycythemia vera 11/28/2021    Receives phlebotomy if Hct>45    Positive CMV IgG serology 09/21/2023    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     Past Surgical History:   Procedure Laterality Date    ESOPHAGOGASTRODUODENOSCOPY N/A 9/18/2023    Procedure: EGD (ESOPHAGOGASTRODUODENOSCOPY);  Surgeon: Kg Cleaning MD;  Location: [facility] ([address] [place] [address]);   Service: Endoscopy;  Laterality: N/A;     Family History   Problem Relation Age of Onset    Pancreatitis Mother     Cancer Mother     Ovarian cancer Mother     No Known Problems Father     Cancer Brother     Breast cancer Maternal Grandmother      Social History     Tobacco Use    Smoking status: Some Days     Types: Cigarettes    Smokeless tobacco: Never   Substance Use Topics    Alcohol use: Yes    Drug use: Never     Review of Systems   Gastrointestinal:  Positive for abdominal pain.       Physical Exam     Initial Vitals [10/21/23 2139]   BP Pulse Resp Temp SpO2   136/87 (!) 130 17 98.5 °F (36.9 °C) 99 %      MAP       --         Physical Exam    Constitutional: He appears well-developed and well-nourished. He is not diaphoretic. He is easily aroused.   Appears uncomfortable   HENT:   Head: Normocephalic and atraumatic.   Mouth/Throat: Oropharynx is clear and moist. No oropharyngeal exudate.   Eyes: EOM and lids are normal. Pupils are equal, round, and reactive to light. No scleral icterus.   Neck: Phonation normal. Neck supple. No stridor present.   Normal range of motion.  Cardiovascular:  Regular rhythm.   Tachycardia present.         Pulmonary/Chest: Breath sounds normal. No stridor. No respiratory distress. He has no wheezes. He has no rales.   Abdominal: Abdomen is soft. He exhibits no distension. There is abdominal tenderness (LUQ, epigastric, LMQ). There is no rebound.   Musculoskeletal:         General: No tenderness or edema. Normal range of motion.      Cervical back: Normal range of motion and neck supple.     Neurological: He is alert, oriented to person, place, and time and easily aroused. He has normal strength. No sensory deficit.   Skin: Skin is warm and dry. No rash noted. No erythema.   Psychiatric: He has a normal mood and affect. His speech is normal.         ED Course   Procedures  Labs Reviewed   CBC W/ AUTO DIFFERENTIAL - Abnormal; Notable for the following components:       Result Value     RBC 3.59 (*)     Hemoglobin 10.2 (*)     Hematocrit 31.6 (*)     RDW 18.4 (*)     Eos # 0.7 (*)     All other components within normal limits   COMPREHENSIVE METABOLIC PANEL - Abnormal; Notable for the following components:    Potassium 3.3 (*)     CO2 21 (*)     Glucose 113 (*)     BUN 5 (*)     Calcium 8.6 (*)     Albumin 3.0 (*)     ALT 7 (*)     All other components within normal limits   LIPASE - Abnormal; Notable for the following components:    Lipase 96 (*)     All other components within normal limits   MAGNESIUM   ISTAT LACTATE   ISTAT CHEM8          Imaging Results              CT Abdomen Pelvis With Contrast (In process)                      Medications   ceFEPIme (MAXIPIME) 2 g in dextrose 5 % in water (D5W) 100 mL IVPB (MB+) (2 g Intravenous New Bag 10/22/23 0037)   HYDROmorphone injection 2 mg (2 mg Intravenous Given 10/21/23 2248)   prochlorperazine injection Soln 5 mg (5 mg Intravenous Given 10/21/23 2248)   iohexoL (OMNIPAQUE 350) injection 75 mL (75 mLs Intravenous Given 10/22/23 0014)   HYDROmorphone injection 1 mg (1 mg Intravenous Given 10/22/23 0024)     Medical Decision Making  23 y.o. 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 Began vomiting today.     DDx includes but not limited to necrotizing pancreatitis, pseudocyst, abscess, bowel obstruction, perforation, sepsis.     Amount and/or Complexity of Data Reviewed  Labs: ordered. Decision-making details documented in ED Course.  Radiology: ordered.  ECG/medicine tests: ordered and independent interpretation performed. Decision-making details documented in ED Course.     Details: Sinus tachycardia 122 bpm. TWI in III. No STEMI  Discussion of management or test interpretation with external provider(s):  regarding  admission    Risk  Prescription drug management.              Attending Attestation:     Physician Attestation Statement for NP/PA:   I have directed and reviewed the workup performed by the PA/NP.  I performed the substantive portion of the medical decision making.               ED Course as of 10/22/23 0104   Sun Oct 22, 2023   0051 Creatinine: 0.6 [BA]   0052 WBC: 9.30 [BA]   0052 Hemoglobin(!): 10.2 [BA]   0052 Hematocrit(!): 31.6 [BA]   0052 Lipase(!): 96  Appears near baseline [BA]   0052 Evening dose of Cefepime ordered [BA]   0059 Patient signed out to my colleague at the end of my shift pending CT ab/pelv. Patient expresses understanding and agreeable to the plan. I have discussed the care of this patient with my supervising physician.  [BA]      ED Course User Index  [BA] Dakotah Lam PA-C                    Clinical Impression:   Final diagnoses:  [R00.0] Tachycardia  [K85.91] Necrotizing pancreatitis (Primary)  [R10.9] Abdominal pain, unspecified abdominal location        ED Disposition Condition    Observation Stable                Dakotah Lam PA-C  10/22/23 0104       Ricardo Mena Jr., MD  10/22/23 2009

## 2023-10-22 NOTE — HPI
"24yo M w/PMHx of HIV (on Biktarvy, last CD4 296 from 9/20/23), EtOH use, acute necrotizing pancreatitis, splenic vein thrombosis (on Eliquis), asthma, and anemia who presents to ED for persistent abdominal pain and vomiting. He has been in and out of the hospital for the last two weeks. His most recent discharge was on10/19 after admission for recurrent symptoms of his acute necrotizing pancreatitis. He was discharged home on MSIR and Morphine which was working well until yesterday morning (10/21) when he started having increased abdominal pain w/nausea. He attempted to eat something but vomited. He denies any recent alcohol or tobacco use. He reports eating normally since discharge. Last meal was a grilled cheese yesterday for dinner. Last BM was loose and "oily" but normal color w/out blood present. He was evaluated by [facility] on his 10/5 admission who recommended against any intervention on the pancreatic collection due to immaturity. He has been on IV Cefepime since 10/5 with possible end date 11/2.     In the ED, he was afebrile and tachycardic to the 130s. Labs were notable for K 3.3, Bicarb 21, BUN 5, Alb 3.0, Lipase 96. Lactate WNL. Repeat CT A/P showed small R pleural effusion but no acute intra-abdominopelvic abnormality. Peripancreatic fluid collection at the pancreatic tail stable from previous imaging. He was given Dilaudid 2mg x1, Dilaudid 1mg x2 for pain and his evening dose of Cefepime. Hospital Medicine was called for admission of   "

## 2023-10-22 NOTE — ASSESSMENT & PLAN NOTE
Admit with hemoglobin 10.2; Baseline ~ 10    Lab Results   Component Value Date    HGB 10.2 (L) 10/21/2023     - Daily CBC   - Transfuse hgb <7

## 2023-10-22 NOTE — ASSESSMENT & PLAN NOTE
K 3.3 on admission; likely 2/2 to decreased PO intake    - Gave IV K 10meq Q1H for 6 doses   - Replete K PRN

## 2023-10-22 NOTE — PLAN OF CARE
SW attempted DPA at the bedside. DPA not completed secondary to patient receiving nursing care at that time.      SW will re-attempt.                    HUBER Lara, LMSW  [facility]  Case Management  Ext. [phone]

## 2023-10-22 NOTE — SUBJECTIVE & OBJECTIVE
Past Medical History:   Diagnosis Date    Acute necrotizing pancreatitis 09/20/2023    Alcohol use disorder 10/05/2023    Asthma     Hepatitis C antibody positive in blood 09/18/2023 9/2023 PCR negative    HIV infection 10/2021    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/2023    Normocytic anemia 09/18/2023    Polycythemia vera 11/28/2021    Receives phlebotomy if Hct>45    Positive CMV IgG serology 09/21/2023    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       Past Surgical History:   Procedure Laterality Date    ESOPHAGOGASTRODUODENOSCOPY N/A 9/18/2023    Procedure: EGD (ESOPHAGOGASTRODUODENOSCOPY);  Surgeon: Kg Cleaning MD;  Location: [address]);  Service: Endoscopy;  Laterality: N/A;       Review of patient's allergies indicates:   Allergen Reactions    [place] Swelling    Pcn [penicillins] Hives     Tolerates cephalosporins    Pecan nut Swelling    Soy     Sulfa (sulfonamide antibiotics) Hives       No current facility-administered medications on file prior to encounter.     Current Outpatient Medications on File Prior to Encounter   Medication Sig    morphine (MS CONTIN) 15 MG 12 hr tablet Take 1 tablet (15 mg total) by mouth 2 (two) times daily for 14 days, THEN 1 tablet (15 mg total) once daily for 14 days.    morphine (MSIR) 15 MG tablet Take 1 tablet (15 mg total) by mouth every 6 (six) hours as needed for Pain (severe, breakthrough).    apixaban (ELIQUIS) 5 mg Tab Take 1 tablet (5 mg total) by mouth 2 (two) times daily.    kzkpmoakt-nxpbcqxg-vsavlic ala (BIKTARVY) -25 mg (25 kg or greater) Take 1 tablet by mouth once daily.    dextrose 5 % in water (D5W) PgBk 100 mL with ceFEPIme 2 gram SolR 2 g Inject 2 g into the vein every 8 (eight) hours.    promethazine (PHENERGAN) 25 MG tablet Take 25 mg by mouth every 4 (four) hours as needed for Nausea.     Family History       Problem Relation (Age of Onset)    Breast cancer Maternal Grandmother    Cancer Mother, Brother    No Known Problems Father    Ovarian cancer  Mother    Pancreatitis Mother          Tobacco Use    Smoking status: Some Days     Types: Cigarettes    Smokeless tobacco: Never   Substance and Sexual Activity    Alcohol use: Yes    Drug use: Never    Sexual activity: Not on file     Review of Systems   Constitutional:  Negative for chills, fatigue and unexpected weight change.   HENT:  Negative for congestion, sinus pain and sore throat.    Eyes:  Negative for redness and visual disturbance.   Respiratory:  Negative for cough, chest tightness and shortness of breath.    Cardiovascular:  Negative for chest pain, palpitations and leg swelling.   Gastrointestinal:  Positive for abdominal pain. Negative for diarrhea, nausea and vomiting.   Genitourinary:  Negative for decreased urine volume, flank pain, hematuria and urgency.   Musculoskeletal:  Negative for arthralgias, back pain and joint swelling.   Neurological:  Positive for weakness. Negative for light-headedness and headaches.     Objective:     Vital Signs (Most Recent):  Temp: 98.1 °F (36.7 °C) (10/22/23 0311)  Pulse: 101 (10/22/23 0311)  Resp: 18 (10/22/23 0311)  BP: 117/82 (10/22/23 0311)  SpO2: 95 % (10/22/23 0311) Vital Signs (24h Range):  Temp:  [98.1 °F (36.7 °C)-98.5 °F (36.9 °C)] 98.1 °F (36.7 °C)  Pulse:  [] 101  Resp:  [16-18] 18  SpO2:  [95 %-99 %] 95 %  BP: (107-136)/(65-87) 117/82     Weight: 68 kg (150 lb)  Body mass index is 27.44 kg/m².     Physical Exam  Constitutional:       General: He is not in acute distress.     Appearance: He is not toxic-appearing.   HENT:      Head: Normocephalic.      Right Ear: External ear normal.      Left Ear: External ear normal.   Eyes:      General: No scleral icterus.        Right eye: No discharge.         Left eye: No discharge.      Extraocular Movements: Extraocular movements intact.   Cardiovascular:      Rate and Rhythm: Regular rhythm. Tachycardia present.      Pulses: Normal pulses.   Pulmonary:      Effort: Pulmonary effort is normal. No  respiratory distress.   Abdominal:      General: There is no distension.      Tenderness: There is abdominal tenderness in the right lower quadrant, epigastric area, suprapubic area and left lower quadrant. There is guarding. There is no right CVA tenderness or left CVA tenderness.      Comments: Reports pain is mainly in the LLQ; Very tender to palpitation    Musculoskeletal:         General: No swelling.      Right lower leg: No edema.      Left lower leg: No edema.   Skin:     Coloration: Skin is not jaundiced.      Findings: No rash.   Neurological:      General: No focal deficit present.      Mental Status: He is alert and oriented to person, place, and time.   Psychiatric:         Mood and Affect: Mood normal.         Behavior: Behavior normal.                Significant Labs: All pertinent labs within the past 24 hours have been reviewed.  CBC:   Recent Labs   Lab 10/21/23  2321   WBC 9.30   HGB 10.2*   HCT 31.6*        CMP:   Recent Labs   Lab 10/21/23  2321      K 3.3*      CO2 21*   *   BUN 5*   CREATININE 0.6   CALCIUM 8.6*   PROT 6.8   ALBUMIN 3.0*   BILITOT 0.4   ALKPHOS 76   AST 13   ALT 7*   ANIONGAP 13     Lipase:   Recent Labs   Lab 10/21/23  2321   LIPASE 96*       Significant Imaging: I have reviewed all pertinent imaging results/findings within the past 24 hours.

## 2023-10-22 NOTE — ASSESSMENT & PLAN NOTE
High Anion Gap Metabolic Acidosis; AG 13, Delta Gap 3, Delta HCO3 4, Delta-Delta ratio 0.75    - Etiology unknown but chronic; Consider ketoacidosis 2/2 to starvation/decreased PO intake  - Consider sodium bicarbonate for severe acidosis

## 2023-10-22 NOTE — H&P
"[facility] - [facility] ([address] Medicine  History & Physical    Patient Name: Tasia Cardona  MRN: [id number]  Patient Class: OP- Observation  Admission Date: 10/21/2023  Attending Physician: Srini Mtz MD   Primary Care Provider: Pina Jones NP         Patient information was obtained from patient, past medical records and ER records.     Subjective:     Principal Problem:Chronic pancreatitis    Chief Complaint:   Chief Complaint   Patient presents with    Abdominal Pain     C/o abdominal pain starting this am. 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 He has been in and out of the hospital for the last two weeks. His most recent discharge was on10/19 after admission for recurrent symptoms of his acute necrotizing pancreatitis. He was discharged home on MSIR and Morphine which was working well until yesterday morning (10/21) when he started having increased abdominal pain w/nausea. He attempted to eat something but vomited. He denies any recent alcohol or tobacco use. He reports eating normally since discharge. Last meal was a grilled cheese yesterday for dinner. Last BM was loose and "oily" but normal color w/out blood present. He was evaluated by [facility] on his 10/5 admission who recommended against any intervention on the pancreatic collection due to immaturity. He has been on IV Cefepime since 10/5 with possible end date 11/2.     In the ED, he was afebrile and tachycardic to the 130s. Labs were notable for K 3.3, Bicarb 21, BUN 5, Alb 3.0, Lipase 96. Lactate WNL. Repeat CT A/P showed small R pleural effusion but no acute intra-abdominopelvic abnormality. Peripancreatic fluid collection at the pancreatic tail stable from previous imaging. He was given Dilaudid 2mg x1, " Dilaudid 1mg x2 for pain and his evening dose of Cefepime. 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    Normocytic anemia 09/18/2023    Polycythemia vera 11/28/2021    Receives phlebotomy if Hct>45    Positive CMV IgG serology 09/21/2023    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       Past Surgical History:   Procedure Laterality Date    ESOPHAGOGASTRODUODENOSCOPY N/A 9/18/2023    Procedure: EGD (ESOPHAGOGASTRODUODENOSCOPY);  Surgeon: Kg Cleaning MD;  Location: [address]);  Service: Endoscopy;  Laterality: N/A;       Review of patient's allergies indicates:   Allergen Reactions    [place] Swelling    Pcn [penicillins] Hives     Tolerates cephalosporins    Pecan nut Swelling    Soy     Sulfa (sulfonamide antibiotics) Hives       No current facility-administered medications on file prior to encounter.     Current Outpatient Medications on File Prior to Encounter   Medication Sig    morphine (MS CONTIN) 15 MG 12 hr tablet Take 1 tablet (15 mg total) by mouth 2 (two) times daily for 14 days, THEN 1 tablet (15 mg total) once daily for 14 days.    morphine (MSIR) 15 MG tablet Take 1 tablet (15 mg total) by mouth every 6 (six) hours as needed for Pain (severe, breakthrough).    apixaban (ELIQUIS) 5 mg Tab Take 1 tablet (5 mg total) by mouth 2 (two) times daily.    leqxuccsl-mvugusya-bghzraa ala (BIKTARVY) -25 mg (25 kg or greater) Take 1 tablet by mouth once daily.    dextrose 5 % in water (D5W) PgBk 100 mL with ceFEPIme 2 gram SolR 2 g Inject 2 g into the vein every 8 (eight) hours.    promethazine (PHENERGAN) 25 MG tablet Take 25 mg by mouth every 4 (four) hours as needed for Nausea.     Family History       Problem Relation (Age of  Onset)    Breast cancer Maternal Grandmother    Cancer Mother, Brother    No Known Problems Father    Ovarian cancer Mother    Pancreatitis Mother          Tobacco Use    Smoking status: Some Days     Types: Cigarettes    Smokeless tobacco: Never   Substance and Sexual Activity    Alcohol use: Yes    Drug use: Never    Sexual activity: Not on file     Review of Systems   Constitutional:  Negative for chills, fatigue and unexpected weight change.   HENT:  Negative for congestion, sinus pain and sore throat.    Eyes:  Negative for redness and visual disturbance.   Respiratory:  Negative for cough, chest tightness and shortness of breath.    Cardiovascular:  Negative for chest pain, palpitations and leg swelling.   Gastrointestinal:  Positive for abdominal pain. Negative for diarrhea, nausea and vomiting.   Genitourinary:  Negative for decreased urine volume, flank pain, hematuria and urgency.   Musculoskeletal:  Negative for arthralgias, back pain and joint swelling.   Neurological:  Positive for weakness. Negative for light-headedness and headaches.     Objective:     Vital Signs (Most Recent):  Temp: 98.1 °F (36.7 °C) (10/22/23 0311)  Pulse: 101 (10/22/23 0311)  Resp: 18 (10/22/23 0427)  BP: 117/82 (10/22/23 0311)  SpO2: 95 % (10/22/23 0311) Vital Signs (24h Range):  Temp:  [98.1 °F (36.7 °C)-98.5 °F (36.9 °C)] 98.1 °F (36.7 °C)  Pulse:  [] 101  Resp:  [16-18] 18  SpO2:  [95 %-99 %] 95 %  BP: (107-136)/(65-87) 117/82     Weight: 68 kg (150 lb)  Body mass index is 27.44 kg/m².     Physical Exam  Constitutional:       General: He is not in acute distress.     Appearance: He is not toxic-appearing.   HENT:      Head: Normocephalic.      Right Ear: External ear normal.      Left Ear: External ear normal.   Eyes:      General: No scleral icterus.        Right eye: No discharge.         Left eye: No discharge.      Extraocular Movements: Extraocular movements intact.   Cardiovascular:      Rate and Rhythm: Regular  rhythm. Tachycardia present.      Pulses: Normal pulses.   Pulmonary:      Effort: Pulmonary effort is normal. No respiratory distress.   Abdominal:      General: There is no distension.      Tenderness: There is abdominal tenderness in the right lower quadrant, epigastric area, suprapubic area and left lower quadrant. There is guarding. There is no right CVA tenderness or left CVA tenderness.      Comments: Reports pain is mainly in the LLQ; Very tender to palpitation    Musculoskeletal:         General: No swelling.      Right lower leg: No edema.      Left lower leg: No edema.   Skin:     Coloration: Skin is not jaundiced.      Findings: No rash.   Neurological:      General: No focal deficit present.      Mental Status: He is alert and oriented to person, place, and time.   Psychiatric:         Mood and Affect: Mood normal.         Behavior: Behavior normal.                Significant Labs: All pertinent labs within the past 24 hours have been reviewed.  CBC:   Recent Labs   Lab 10/21/23  2321   WBC 9.30   HGB 10.2*   HCT 31.6*          CMP:   Recent Labs   Lab 10/21/23  2321      K 3.3*      CO2 21*   *   BUN 5*   CREATININE 0.6   CALCIUM 8.6*   PROT 6.8   ALBUMIN 3.0*   BILITOT 0.4   ALKPHOS 76   AST 13   ALT 7*   ANIONGAP 13       Lipase:   Recent Labs   Lab 10/21/23  2321   LIPASE 96*         Significant Imaging: I have reviewed all pertinent imaging results/findings within the past 24 hours.    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 Lipase mildly elevated. WBC and lactate WNL. On IV Cefepime for 4wk course. Discharged on MSIR and Morphine for pain control.     - Administered 1/2NS bolus for IVF  - Continue Cefepime - possible end date of  "11/2?  - NPO, advance as tolerated  - Currently not tolerating PO meds - Dilaudid 1mg Q6H PRN; scheduled Tylenol and Motrin when PO is appropriate  - bowel regimen initiated - pericolace BID, miralax PRN.    - Consider re-consulting GI for further recs/possible intervention given multiple appearances to the hospital for pancreatitis pain. Would possibly benefit from PO pancreatic enzymes at discharge            Increased anion gap metabolic acidosis  High Anion Gap Metabolic Acidosis; AG 13, Delta Gap 3, Delta HCO3 4, Delta-Delta ratio 0.75    - Etiology unknown but chronic; Consider ketoacidosis 2/2 to starvation/decreased PO intake  - Consider sodium bicarbonate for severe acidosis         Hypokalemia  K 3.3 on admission; likely 2/2 to decreased PO intake    - Gave IV K 10meq Q1H for 6 doses   - Replete K PRN       Continuous severe abdominal pain  Likely 2/2 to pancreatitis; Discharged home on Morphine taper which was working well until yesterday morning    - See Chronic Pancreatitis       Splenic vein thrombosis  Splenic vein thrombus originally seen on 9/20 imaging. Started on DOAC at last admission for prophylaxis.     - Continue home Eliquis BID       Normocytic anemia  Admit with hemoglobin 10.2; Baseline ~ 10    Lab Results   Component Value Date    HGB 10.2 (L) 10/21/2023     - Daily CBC   - Transfuse hgb <7       HIV infection  This patient in known to have HIV+ status (Have detected HIV PCR but never CD4 <200 or AIDS defining illness). Labs reviewed- No results found for: "CD4", No results found for: "HIVDNAPCR". Patient is on HAART. Will continue HAART. Continue to monitor routine labs. Last CD4 count was   Lab Results   Component Value Date    ABSOLUTECD4 1080 10/12/2023     - Continue home Biktarvy         VTE Risk Mitigation (From admission, onward)           Ordered     apixaban tablet 5 mg  2 times daily        See [place] for full Linked Orders Report.    10/22/23 0222     apixaban tablet 10 mg "  2 times daily        See [facility]ers[facility] for full Linked Orders Report.    10/22/23 0222                           On 10/22/2023, patient should be placed in hospital observation services under my care in collaboration with Dr. Mtz.        Stephanie Hunter MD  Department of Hospital Medicine  [facility] - Intensive Care ([facility]-)    N/A  Family history is reviewed and has not changed   Pertinent information:

## 2023-10-22 NOTE — ED NOTES
I-STAT Chem-8+ Results:   Value Reference Range   Sodium 136 136-145 mmol/L   Potassium  3.2 3.5-5.1 mmol/L   Chloride 99  mmol/L   Ionized Calcium 1.09 1.06-1.42 mmol/L   CO2 (measured) 22 23-29 mmol/L   Glucose 119  mg/dL   BUN <3 6-30 mg/dL   Creatinine 0.4 0.5-1.4 mg/dL   Hematocrit 33 36-54%

## 2023-10-22 NOTE — SUBJECTIVE & OBJECTIVE
Interval history: Patient resting comfortably in bed this morning. Remains intermittently nauseated that resolves with phenergan. Pain requiring breakthrough dilaudid every 6H. No other complaints this AM.    Past Medical History:   Diagnosis Date    Acute necrotizing pancreatitis 09/20/2023    Alcohol use disorder 10/05/2023    Asthma     Hepatitis C antibody positive in blood 09/18/2023 9/2023 PCR negative    HIV infection 10/2021    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/2023    Normocytic anemia 09/18/2023    Polycythemia vera 11/28/2021    Receives phlebotomy if Hct>45    Positive CMV IgG serology 09/21/2023    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       Past Surgical History:   Procedure Laterality Date    ESOPHAGOGASTRODUODENOSCOPY N/A 9/18/2023    Procedure: EGD (ESOPHAGOGASTRODUODENOSCOPY);  Surgeon: Kg Cleaning MD;  Location: [address]);  Service: Endoscopy;  Laterality: N/A;       Review of patient's allergies indicates:   Allergen Reactions    [place] Swelling    Pcn [penicillins] Hives     Tolerates cephalosporins    Pecan nut Swelling    Soy     Sulfa (sulfonamide antibiotics) Hives       No current facility-administered medications on file prior to encounter.     Current Outpatient Medications on File Prior to Encounter   Medication Sig    morphine (MS CONTIN) 15 MG 12 hr tablet Take 1 tablet (15 mg total) by mouth 2 (two) times daily for 14 days, THEN 1 tablet (15 mg total) once daily for 14 days.    morphine (MSIR) 15 MG tablet Take 1 tablet (15 mg total) by mouth every 6 (six) hours as needed for Pain (severe, breakthrough).    apixaban (ELIQUIS) 5 mg Tab Take 1 tablet (5 mg total) by mouth 2 (two) times daily.    dyfwtmbcm-iinnvvyn-ssvqyrw ala (BIKTARVY) -25 mg (25 kg or greater) Take 1 tablet by mouth once daily.    dextrose 5 % in water (D5W) PgBk 100 mL with ceFEPIme 2 gram SolR 2 g Inject 2 g into the vein every 8 (eight) hours.    promethazine (PHENERGAN) 25 MG tablet Take 25 mg by mouth  every 4 (four) hours as needed for Nausea.     Family History       Problem Relation (Age of Onset)    Breast cancer Maternal Grandmother    Cancer Mother, Brother    No Known Problems Father    Ovarian cancer Mother    Pancreatitis Mother          Tobacco Use    Smoking status: Some Days     Types: Cigarettes    Smokeless tobacco: Never   Substance and Sexual Activity    Alcohol use: Yes    Drug use: Never    Sexual activity: Not on file     Review of Systems   Constitutional:  Negative for chills, fatigue and unexpected weight change.   HENT:  Negative for congestion, sinus pain and sore throat.    Eyes:  Negative for redness and visual disturbance.   Respiratory:  Negative for cough, chest tightness and shortness of breath.    Cardiovascular:  Negative for chest pain, palpitations and leg swelling.   Gastrointestinal:  Positive for abdominal pain. Negative for diarrhea, nausea and vomiting.   Genitourinary:  Negative for decreased urine volume, flank pain, hematuria and urgency.   Musculoskeletal:  Negative for arthralgias, back pain and joint swelling.   Neurological:  Positive for weakness. Negative for light-headedness and headaches.     Objective:     Vital Signs (Most Recent):  Temp: 98.1 °F (36.7 °C) (10/22/23 0311)  Pulse: 101 (10/22/23 0311)  Resp: 18 (10/22/23 0427)  BP: 117/82 (10/22/23 0311)  SpO2: 95 % (10/22/23 0311) Vital Signs (24h Range):  Temp:  [98.1 °F (36.7 °C)-98.5 °F (36.9 °C)] 98.1 °F (36.7 °C)  Pulse:  [] 101  Resp:  [16-18] 18  SpO2:  [95 %-99 %] 95 %  BP: (107-136)/(65-87) 117/82     Weight: 68 kg (150 lb)  Body mass index is 27.44 kg/m².     Physical Exam  Constitutional:       General: He is not in acute distress.     Appearance: He is not toxic-appearing.   HENT:      Head: Normocephalic.      Right Ear: External ear normal.      Left Ear: External ear normal.   Eyes:      General: No scleral icterus.        Right eye: No discharge.         Left eye: No discharge.       Extraocular Movements: Extraocular movements intact.   Cardiovascular:      Rate and Rhythm: Regular rhythm. Tachycardia present.      Pulses: Normal pulses.   Pulmonary:      Effort: Pulmonary effort is normal. No respiratory distress.   Abdominal:      General: Abdomen is flat. Bowel sounds are normal. There is no distension.      Palpations: Abdomen is soft.      Tenderness: There is abdominal tenderness (epigastrium). There is no right CVA tenderness, left CVA tenderness, guarding or rebound.   Musculoskeletal:         General: No swelling.      Right lower leg: No edema.      Left lower leg: No edema.   Skin:     Coloration: Skin is not jaundiced.      Findings: No rash.   Neurological:      General: No focal deficit present.      Mental Status: He is alert and oriented to person, place, and time.   Psychiatric:         Mood and Affect: Mood normal.         Behavior: Behavior normal.                Significant Labs: All pertinent labs within the past 24 hours have been reviewed.  CBC:   Recent Labs   Lab 10/21/23  2321   WBC 9.30   HGB 10.2*   HCT 31.6*          CMP:   Recent Labs   Lab 10/21/23  2321      K 3.3*      CO2 21*   *   BUN 5*   CREATININE 0.6   CALCIUM 8.6*   PROT 6.8   ALBUMIN 3.0*   BILITOT 0.4   ALKPHOS 76   AST 13   ALT 7*   ANIONGAP 13       Lipase:   Recent Labs   Lab 10/21/23  2321   LIPASE 96*         Significant Imaging: I have reviewed all pertinent imaging results/findings within the past 24 hours.

## 2023-10-22 NOTE — ASSESSMENT & PLAN NOTE
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 Lipase mildly elevated. WBC and lactate WNL. On IV Cefepime for 4wk course. Discharged on MSIR and Morphine for pain control.     - Continue Cefepime - possible end date of 11/2?  - NPO, advance as tolerated  - Currently not tolerating PO meds - Dilaudid 1mg Q6H PRN; scheduled Tylenol and Motrin when PO is appropriate  - bowel regimen initiated - pericolace BID, miralax PRN.

## 2023-10-22 NOTE — ASSESSMENT & PLAN NOTE
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 Lipase mildly elevated. WBC and lactate WNL. On IV Cefepime for 4wk course. Discharged on MSIR and Morphine for pain control.     - EUS +/- ERCP today   - Continue IVF  - Continue Cefepime - per ID recs on previous admission, plan to treat until 11/2  - NPO, advance as tolerated  - paincontrol - Dilaudid 1mg Q6H PRN; scheduled Tylenol and toradol  - bowel regimen initiated - pericolace BID, miralax PRN.    - pancreatic enzyme supplementation and diet education once diet is advanced

## 2023-10-22 NOTE — ASSESSMENT & PLAN NOTE
Splenic vein thrombus originally seen on 9/20 imaging. Started on DOAC at last admission for prophylaxis.     - Continue home Eliquis BID

## 2023-10-22 NOTE — ED TRIAGE NOTES
Tasia Cardona, a 23 y.o. male presents to the ED w/ complaint of LUQ abdominal pain and vomiting. 9 days ago patient was admitted to the ICU for necrotizing pancreatitis in the setting of HIV+. He was discharged 3 days ago. Patient reports increasing LUQ pain today which is not controlled by PO morphine at home. Midline cath in place right upper arm. Denies known fever, but endorses hot flashes and chills. Assoc left shoulder pains.    Triage note:  Chief Complaint   Patient presents with    Abdominal Pain     C/o abdominal pain starting this am. Hx chronic pancreatitis     Review of patient's allergies indicates:   Allergen Reactions    [place] Swelling    Pcn [penicillins] Hives     Tolerates cephalosporins    Pecan nut Swelling    Soy     Sulfa (sulfonamide antibiotics) Hives     Past Medical History:   Diagnosis Date    Acute necrotizing pancreatitis 09/20/2023    Alcohol use disorder 10/05/2023    Asthma     Hepatitis C antibody positive in blood 09/18/2023 9/2023 PCR negative    HIV infection 10/2021    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/2023    Normocytic anemia 09/18/2023    Polycythemia vera 11/28/2021    Receives phlebotomy if Hct>45    Positive CMV IgG serology 09/21/2023    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

## 2023-10-23 ENCOUNTER — ANESTHESIA (OUTPATIENT)
Dept: ENDOSCOPY | Facility: HOSPITAL | Age: 23
DRG: 438 | End: 2023-10-23
Payer: MEDICAID

## 2023-10-23 ENCOUNTER — ANESTHESIA EVENT (OUTPATIENT)
Dept: ENDOSCOPY | Facility: HOSPITAL | Age: 23
DRG: 438 | End: 2023-10-23
Payer: MEDICAID

## 2023-10-23 LAB
ALBUMIN SERPL BCP-MCNC: 2.6 G/DL (ref 3.5–5.2)
ALP SERPL-CCNC: 70 U/L (ref 55–135)
ALT SERPL W/O P-5'-P-CCNC: 8 U/L (ref 10–44)
ANION GAP SERPL CALC-SCNC: 10 MMOL/L (ref 8–16)
AST SERPL-CCNC: 16 U/L (ref 10–40)
BASOPHILS # BLD AUTO: 0.04 K/UL (ref 0–0.2)
BASOPHILS NFR BLD: 0.9 % (ref 0–1.9)
BILIRUB SERPL-MCNC: 0.3 MG/DL (ref 0.1–1)
BUN SERPL-MCNC: 3 MG/DL (ref 6–20)
BUN SERPL-MCNC: <3 MG/DL (ref 6–30)
CALCIUM SERPL-MCNC: 8.3 MG/DL (ref 8.7–10.5)
CHLORIDE SERPL-SCNC: 109 MMOL/L (ref 95–110)
CHLORIDE SERPL-SCNC: 99 MMOL/L (ref 95–110)
CO2 SERPL-SCNC: 20 MMOL/L (ref 23–29)
CREAT SERPL-MCNC: 0.4 MG/DL (ref 0.5–1.4)
CREAT SERPL-MCNC: 0.6 MG/DL (ref 0.5–1.4)
DIFFERENTIAL METHOD: ABNORMAL
EOSINOPHIL # BLD AUTO: 0.5 K/UL (ref 0–0.5)
EOSINOPHIL NFR BLD: 11.2 % (ref 0–8)
ERYTHROCYTE [DISTWIDTH] IN BLOOD BY AUTOMATED COUNT: 18.4 % (ref 11.5–14.5)
EST. GFR  (NO RACE VARIABLE): >60 ML/MIN/1.73 M^2
GLUCOSE SERPL-MCNC: 119 MG/DL (ref 70–110)
GLUCOSE SERPL-MCNC: 98 MG/DL (ref 70–110)
HCT VFR BLD AUTO: 30.7 % (ref 40–54)
HCT VFR BLD CALC: 33 %PCV (ref 36–54)
HGB BLD-MCNC: 9.4 G/DL (ref 14–18)
IMM GRANULOCYTES # BLD AUTO: 0.01 K/UL (ref 0–0.04)
IMM GRANULOCYTES NFR BLD AUTO: 0.2 % (ref 0–0.5)
LYMPHOCYTES # BLD AUTO: 1.3 K/UL (ref 1–4.8)
LYMPHOCYTES NFR BLD: 30.7 % (ref 18–48)
MAGNESIUM SERPL-MCNC: 1.9 MG/DL (ref 1.6–2.6)
MCH RBC QN AUTO: 28 PG (ref 27–31)
MCHC RBC AUTO-ENTMCNC: 30.6 G/DL (ref 32–36)
MCV RBC AUTO: 91 FL (ref 82–98)
MONOCYTES # BLD AUTO: 0.4 K/UL (ref 0.3–1)
MONOCYTES NFR BLD: 9.8 % (ref 4–15)
NEUTROPHILS # BLD AUTO: 2.1 K/UL (ref 1.8–7.7)
NEUTROPHILS NFR BLD: 47.2 % (ref 38–73)
NRBC BLD-RTO: 0 /100 WBC
PHOSPHATE SERPL-MCNC: 4.2 MG/DL (ref 2.7–4.5)
PLATELET # BLD AUTO: 262 K/UL (ref 150–450)
PMV BLD AUTO: 10.5 FL (ref 9.2–12.9)
POC IONIZED CALCIUM: 1.09 MMOL/L (ref 1.06–1.42)
POC TCO2 (MEASURED): 22 MMOL/L (ref 23–29)
POTASSIUM BLD-SCNC: 3.2 MMOL/L (ref 3.5–5.1)
POTASSIUM SERPL-SCNC: 3.8 MMOL/L (ref 3.5–5.1)
PROT SERPL-MCNC: 6 G/DL (ref 6–8.4)
RBC # BLD AUTO: 3.36 M/UL (ref 4.6–6.2)
SAMPLE: ABNORMAL
SODIUM BLD-SCNC: 136 MMOL/L (ref 136–145)
SODIUM SERPL-SCNC: 139 MMOL/L (ref 136–145)
WBC # BLD AUTO: 4.37 K/UL (ref 3.9–12.7)

## 2023-10-23 PROCEDURE — D9220A PRA ANESTHESIA: ICD-10-PCS | Mod: ANES,,, | Performed by: ANESTHESIOLOGY

## 2023-10-23 PROCEDURE — 84100 ASSAY OF PHOSPHORUS: CPT

## 2023-10-23 PROCEDURE — D9220A PRA ANESTHESIA: Mod: CRNA,,, | Performed by: NURSE ANESTHETIST, CERTIFIED REGISTERED

## 2023-10-23 PROCEDURE — 25000003 PHARM REV CODE 250: Performed by: INTERNAL MEDICINE

## 2023-10-23 PROCEDURE — 37000009 HC ANESTHESIA EA ADD 15 MINS: Performed by: INTERNAL MEDICINE

## 2023-10-23 PROCEDURE — 63600175 PHARM REV CODE 636 W HCPCS

## 2023-10-23 PROCEDURE — 94761 N-INVAS EAR/PLS OXIMETRY MLT: CPT

## 2023-10-23 PROCEDURE — 63600175 PHARM REV CODE 636 W HCPCS: Performed by: NURSE ANESTHETIST, CERTIFIED REGISTERED

## 2023-10-23 PROCEDURE — D9220A PRA ANESTHESIA: Mod: ANES,,, | Performed by: ANESTHESIOLOGY

## 2023-10-23 PROCEDURE — 36415 COLL VENOUS BLD VENIPUNCTURE: CPT

## 2023-10-23 PROCEDURE — 85025 COMPLETE CBC W/AUTO DIFF WBC: CPT

## 2023-10-23 PROCEDURE — 43259 EGD US EXAM DUODENUM/JEJUNUM: CPT | Mod: ,,, | Performed by: INTERNAL MEDICINE

## 2023-10-23 PROCEDURE — 99233 PR SUBSEQUENT HOSPITAL CARE,LEVL III: ICD-10-PCS | Mod: ,,, | Performed by: INTERNAL MEDICINE

## 2023-10-23 PROCEDURE — 83735 ASSAY OF MAGNESIUM: CPT

## 2023-10-23 PROCEDURE — D9220A PRA ANESTHESIA: ICD-10-PCS | Mod: CRNA,,, | Performed by: NURSE ANESTHETIST, CERTIFIED REGISTERED

## 2023-10-23 PROCEDURE — 80053 COMPREHEN METABOLIC PANEL: CPT

## 2023-10-23 PROCEDURE — 25000003 PHARM REV CODE 250: Performed by: NURSE ANESTHETIST, CERTIFIED REGISTERED

## 2023-10-23 PROCEDURE — 12000002 HC ACUTE/MED SURGE SEMI-PRIVATE ROOM

## 2023-10-23 PROCEDURE — 43259 EGD US EXAM DUODENUM/JEJUNUM: CPT | Performed by: INTERNAL MEDICINE

## 2023-10-23 PROCEDURE — 43259 PR ENDOSCOPIC ULTRASOUND EXAM: ICD-10-PCS | Mod: ,,, | Performed by: INTERNAL MEDICINE

## 2023-10-23 PROCEDURE — 37000008 HC ANESTHESIA 1ST 15 MINUTES: Performed by: INTERNAL MEDICINE

## 2023-10-23 PROCEDURE — 99900035 HC TECH TIME PER 15 MIN (STAT)

## 2023-10-23 PROCEDURE — 25000003 PHARM REV CODE 250

## 2023-10-23 PROCEDURE — 99233 SBSQ HOSP IP/OBS HIGH 50: CPT | Mod: ,,, | Performed by: INTERNAL MEDICINE

## 2023-10-23 RX ORDER — LIDOCAINE HYDROCHLORIDE 20 MG/ML
INJECTION INTRAVENOUS
Status: DISCONTINUED | OUTPATIENT
Start: 2023-10-23 | End: 2023-10-23

## 2023-10-23 RX ORDER — SUCCINYLCHOLINE CHLORIDE 20 MG/ML
INJECTION INTRAMUSCULAR; INTRAVENOUS
Status: DISCONTINUED | OUTPATIENT
Start: 2023-10-23 | End: 2023-10-23

## 2023-10-23 RX ORDER — MIDAZOLAM HYDROCHLORIDE 1 MG/ML
INJECTION, SOLUTION INTRAMUSCULAR; INTRAVENOUS
Status: DISCONTINUED | OUTPATIENT
Start: 2023-10-23 | End: 2023-10-23

## 2023-10-23 RX ORDER — PROPOFOL 10 MG/ML
VIAL (ML) INTRAVENOUS
Status: DISCONTINUED | OUTPATIENT
Start: 2023-10-23 | End: 2023-10-23

## 2023-10-23 RX ORDER — SODIUM CHLORIDE 0.9 % (FLUSH) 0.9 %
3 SYRINGE (ML) INJECTION
Status: CANCELLED | OUTPATIENT
Start: 2023-10-23

## 2023-10-23 RX ORDER — FENTANYL CITRATE 50 UG/ML
INJECTION, SOLUTION INTRAMUSCULAR; INTRAVENOUS
Status: DISCONTINUED | OUTPATIENT
Start: 2023-10-23 | End: 2023-10-23

## 2023-10-23 RX ORDER — ROCURONIUM BROMIDE 10 MG/ML
INJECTION, SOLUTION INTRAVENOUS
Status: DISCONTINUED | OUTPATIENT
Start: 2023-10-23 | End: 2023-10-23

## 2023-10-23 RX ORDER — ONDANSETRON 2 MG/ML
INJECTION INTRAMUSCULAR; INTRAVENOUS
Status: DISCONTINUED | OUTPATIENT
Start: 2023-10-23 | End: 2023-10-23

## 2023-10-23 RX ADMIN — MIDAZOLAM HYDROCHLORIDE 2 MG: 1 INJECTION, SOLUTION INTRAMUSCULAR; INTRAVENOUS at 05:10

## 2023-10-23 RX ADMIN — FOLIC ACID 1 MG: 1 TABLET ORAL at 09:10

## 2023-10-23 RX ADMIN — SUCCINYLCHOLINE CHLORIDE 140 MG: 20 INJECTION, SOLUTION INTRAMUSCULAR; INTRAVENOUS at 05:10

## 2023-10-23 RX ADMIN — FENTANYL CITRATE 50 MCG: 50 INJECTION, SOLUTION INTRAMUSCULAR; INTRAVENOUS at 05:10

## 2023-10-23 RX ADMIN — POTASSIUM CHLORIDE, DEXTROSE MONOHYDRATE AND SODIUM CHLORIDE: 150; 5; 450 INJECTION, SOLUTION INTRAVENOUS at 01:10

## 2023-10-23 RX ADMIN — ACETAMINOPHEN 1000 MG: 500 TABLET ORAL at 01:10

## 2023-10-23 RX ADMIN — ONDANSETRON 4 MG: 2 INJECTION INTRAMUSCULAR; INTRAVENOUS at 05:10

## 2023-10-23 RX ADMIN — PROMETHAZINE HYDROCHLORIDE 6.25 MG: 25 INJECTION INTRAMUSCULAR; INTRAVENOUS at 11:10

## 2023-10-23 RX ADMIN — PROPOFOL 200 MG: 10 INJECTION, EMULSION INTRAVENOUS at 05:10

## 2023-10-23 RX ADMIN — HYDROMORPHONE HYDROCHLORIDE 2 MG: 2 INJECTION, SOLUTION INTRAMUSCULAR; INTRAVENOUS; SUBCUTANEOUS at 06:10

## 2023-10-23 RX ADMIN — PANTOPRAZOLE SODIUM 40 MG: 40 TABLET, DELAYED RELEASE ORAL at 08:10

## 2023-10-23 RX ADMIN — CEFEPIME 2 G: 2 INJECTION, POWDER, FOR SOLUTION INTRAVENOUS at 06:10

## 2023-10-23 RX ADMIN — ROCURONIUM BROMIDE 5 MG: 10 INJECTION INTRAVENOUS at 05:10

## 2023-10-23 RX ADMIN — ACETAMINOPHEN 1000 MG: 500 TABLET ORAL at 05:10

## 2023-10-23 RX ADMIN — MORPHINE SULFATE 15 MG: 15 TABLET, EXTENDED RELEASE ORAL at 09:10

## 2023-10-23 RX ADMIN — KETOROLAC TROMETHAMINE 30 MG: 30 INJECTION INTRAMUSCULAR; INTRAVENOUS at 08:10

## 2023-10-23 RX ADMIN — CEFEPIME 2 G: 2 INJECTION, POWDER, FOR SOLUTION INTRAVENOUS at 09:10

## 2023-10-23 RX ADMIN — HYDROMORPHONE HYDROCHLORIDE 2 MG: 2 INJECTION, SOLUTION INTRAMUSCULAR; INTRAVENOUS; SUBCUTANEOUS at 10:10

## 2023-10-23 RX ADMIN — KETOROLAC TROMETHAMINE 30 MG: 30 INJECTION INTRAMUSCULAR; INTRAVENOUS at 09:10

## 2023-10-23 RX ADMIN — LIDOCAINE HYDROCHLORIDE 100 MG: 20 INJECTION INTRAVENOUS at 05:10

## 2023-10-23 RX ADMIN — CEFEPIME 2 G: 2 INJECTION, POWDER, FOR SOLUTION INTRAVENOUS at 12:10

## 2023-10-23 RX ADMIN — HYDROMORPHONE HYDROCHLORIDE 2 MG: 2 INJECTION, SOLUTION INTRAMUSCULAR; INTRAVENOUS; SUBCUTANEOUS at 04:10

## 2023-10-23 RX ADMIN — SODIUM CHLORIDE: 9 INJECTION, SOLUTION INTRAVENOUS at 05:10

## 2023-10-23 RX ADMIN — POTASSIUM CHLORIDE, DEXTROSE MONOHYDRATE AND SODIUM CHLORIDE: 150; 5; 450 INJECTION, SOLUTION INTRAVENOUS at 10:10

## 2023-10-23 RX ADMIN — MORPHINE SULFATE 15 MG: 15 TABLET, EXTENDED RELEASE ORAL at 08:10

## 2023-10-23 RX ADMIN — BICTEGRAVIR SODIUM, EMTRICITABINE, AND TENOFOVIR ALAFENAMIDE FUMARATE 1 TABLET: 50; 200; 25 TABLET ORAL at 09:10

## 2023-10-23 RX ADMIN — PROMETHAZINE HYDROCHLORIDE 6.25 MG: 25 INJECTION INTRAMUSCULAR; INTRAVENOUS at 04:10

## 2023-10-23 RX ADMIN — ACETAMINOPHEN 1000 MG: 500 TABLET ORAL at 09:10

## 2023-10-23 RX ADMIN — PANTOPRAZOLE SODIUM 40 MG: 40 TABLET, DELAYED RELEASE ORAL at 09:10

## 2023-10-23 NOTE — PLAN OF CARE
[facility] - [facility] ([place] [address])  Initial Discharge Assessment       Primary Care Provider: Pina Jones NP    Admission Diagnosis: Tachycardia [R00.0]  Necrotizing pancreatitis [K85.91]  Chest pain [R07.9]  Abdominal pain, unspecified abdominal location [R10.9]  HIV infection, unspecified symptom status [B20]    Admission Date: 10/21/2023  Expected Discharge Date: 10/25/2023    Transition of Care Barriers: (P) None    Payor: MEDICAID / Plan: [facility] COMMUNITY PLAN [facility] [employer] ([place] MEDICA[place]) / Product Type: Managed Medicaid /     Extended Emergency Contact Information  Primary Emergency Contact: Dulce Denice  Mobile Phone: [phone]  Relation: Mother  Preferred language: English   needed? No    Discharge Plan A: (P) Home with family  Discharge Plan B: (P) Home      [id number] [place], [place] - [address] AT [facility] [address]  [address]  [facility] [address]  Phone: [phone] Fax: [phone]    [facility]  [place] - [address]  [address] [facility] [address]  [place] [address]  Phone: [phone] Fax: [phone]      Initial Assessment (most recent)       Adult Discharge Assessment - 10/23/23 1249          Discharge Assessment    Assessment Type Discharge Planning Assessment (P)      Confirmed/corrected address, phone number and insurance Yes (P)      Confirmed Demographics Correct on Facesheet (P)      Source of Information patient (P)      Communicated CASTILLO with patient/caregiver Yes (P)      People in Home alone (P)      Do you expect to return to your current living situation? Yes (P)      Do you have help at home or someone to help you manage your care at home? No (P)      Prior to hospitilization cognitive status: No Deficits (P)      Current cognitive status: No Deficits (P)      Home Accessibility wheelchair accessible (P)      Home Layout Able to live on 1st floor (P)      Equipment  Currently Used at Home none (P)      Patient currently being followed by outpatient case management? No (P)      Do you currently have service(s) that help you manage your care at home? No (P)      Do you take prescription medications? Yes (P)      Do you have prescription coverage? Yes (P)      Coverage MEDICAID - [facility] COMMUNITY PLAN [facility] ([place] MEDICAID) (P)      Do you have any problems affording any of your prescribed medications? No (P)      Is the patient taking medications as prescribed? yes (P)      Who is going to help you get home at discharge? Patient states that he will uber home (P)      How do you get to doctors appointments? public transportation;family or friend will provide (P)      Are you on dialysis? No (P)      Do you take coumadin? No (P)      DME Needed Upon Discharge  none (P)      Discharge Plan discussed with: Patient (P)      Transition of Care Barriers None (P)      Discharge Plan A Home with family (P)      Discharge Plan B Home (P)         Financial Resource Strain    How hard is it for you to pay for the very basics like food, housing, medical care, and heating? Not very hard (P)         Housing Stability    In the last 12 months, was there a time when you were not able to pay the mortgage or rent on time? No (P)      In the last 12 months, how many places have you lived? 1 (P)      In the last 12 months, was there a time when you did not have a steady place to sleep or slept in a shelter (including now)? No (P)         Transportation Needs    In the past 12 months, has lack of transportation kept you from medical appointments or from getting medications? No (P)      In the past 12 months, has lack of transportation kept you from meetings, work, or from getting things needed for daily living? No (P)         Food Insecurity    Within the past 12 months, you worried that your food would run out before you got the money to buy more. Never true (P)      Within the past 12 months, the  food you bought just didn't last and you didn't have money to get more. Never true (P)         Social Connections    In a typical week, how many times do you talk on the phone with family, friends, or neighbors? Three times a week (P)      How often do you get together with friends or relatives? Three times a week (P)      How often do you attend Sabianism or Christianity services? Never (P)      Do you belong to any clubs or organizations such as Sabianism groups, unions, fraternal or athletic groups, or school groups? No (P)      How often do you attend meetings of the clubs or organizations you belong to? Never (P)      Are you , , , , never , or living with a partner? Never  (P)         Alcohol Use    Q1: How often do you have a drink containing alcohol? Never (P)      Q2: How many drinks containing alcohol do you have on a typical day when you are drinking? Patient does not drink (P)      Q3: How often do you have six or more drinks on one occasion? Never (P)                  Discharge Plan A and Plan B have been determined by review of patient's clinical status, future medical and therapeutic needs, and coverage/benefits for post-acute care in coordination with multidisciplinary team members.                             HUBER Lara, LMSW  [facility]  Case Management  Ext. [phone]

## 2023-10-23 NOTE — NURSING TRANSFER
Nursing Transfer Note      10/23/2023   6:35 PM    Nurse giving handoff:Alexandra  Nurse receiving handoff:Sia    Reason patient is being transferred: post op    Transfer To: [id number]    Transfer via stretcher    Transfer with none    Transported by pt transport     Transfer Vital Signs: See Flowsheet      Telemetry: none  Order for Tele Monitor? Yes    Additional Lines: none    4eyes on Skin: yes    Medicines sent: none    Any special needs or follow-up needed: none    Patient belongings transferred with patient: No    Chart send with patient: Yes    Notified: pt mother     Patient reassessed at: 10/23/2023 1830  1  Upon arrival to floor: patient oriented to room, call bell in reach, and bed in lowest position

## 2023-10-23 NOTE — PLAN OF CARE
Pt states that he still does not require pain medication at present.  Pt able to ambulate to restroom without difficulty. Will continue to monitor.

## 2023-10-23 NOTE — HOSPITAL COURSE
Admitted to hospital medicine for pain control. Underwent EUS with findings of stable fluid collection which was not drained. A repeat CT Abd/Pelvis on 10/29 showed similar prior fluid collection with new fluid along the posterior aspect of the liver causing IVC compression. ID consulted and antibiotics broadened to Meropenem + Diflucan on 10/30 after fevers on evening of 10/29. He underwent IR aspiration of his older collection on 11/2 w/ 7cc fluid aspirated. Fluid studies negative for infectious process at this time. Repeat imaging with stable vs. Interval decrease in abdominal collections, though notable large R-sided pleural effusion. Course complicated by acute respiratory failure with effusion, possibly 2/2 SIRS/third spacing 2/2 hypoalbuminemia. Pulmonology consulted for pleural efffusion, patient refusing thoracentesis. Trial of diuresis w/ improvement in oxygenation. PCA pump initiated after patient requiring 2mg dilaudid every 2H. Eventually pain was consulted for weaning PCA pump. Started on suboxone and MMPC. Will discharge home with plans to f/u with GI in 1 month for repeat scan. He will finish meropenem and fluconazole on 11/12/23.

## 2023-10-23 NOTE — TRANSFER OF CARE
"Anesthesia Transfer of Care Note    Patient: Tasia Cardona    Procedure(s) Performed: Procedure(s) (LRB):  ULTRASOUND, UPPER GI TRACT, ENDOSCOPIC (N/A)  ERCP (ENDOSCOPIC RETROGRADE CHOLANGIOPANCREATOGRAPHY) (N/A)    Patient location: PACU    Anesthesia Type: general    Transport from OR: Transported from OR on 6-10 L/min O2 by face mask with adequate spontaneous ventilation    Post pain: adequate analgesia    Post assessment: no apparent anesthetic complications and tolerated procedure well    Post vital signs: stable    Level of consciousness: awake    Nausea/Vomiting: no nausea/vomiting    Complications: none    Transfer of care protocol was followed      Last vitals:   Visit Vitals  /71 (BP Location: Left arm, Patient Position: Lying)   Pulse 68   Temp 36.8 °C (98.2 °F)   Resp 16   Ht 5' 10" (1.778 m)   Wt 68 kg (150 lb)   SpO2 (!) 92%   BMI 21.52 kg/m²     "

## 2023-10-23 NOTE — PROGRESS NOTES
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 He has been in and out of the hospital for the last two weeks. His most recent discharge was on10/19 after admission for recurrent symptoms of his acute necrotizing pancreatitis. He was discharged home on MSIR and Morphine which was working well until yesterday morning (10/21) when he started having increased abdominal pain w/nausea. He attempted to eat something but vomited. He denies any recent alcohol or tobacco use. He reports eating normally since discharge. Last meal was a grilled cheese yesterday for dinner. Last BM was loose and "oily" but normal color w/out blood present. He was evaluated by AES on his 10/5 admission who recommended against any intervention on the pancreatic collection due to immaturity. He has been on IV Cefepime since 10/5 with possible end date 11/2.     In the ED, he was afebrile and tachycardic to the 130s. Labs were notable for K 3.3, Bicarb 21, BUN 5, Alb 3.0, Lipase 96. Lactate WNL. Repeat CT A/P showed small R pleural effusion but no acute intra-abdominopelvic abnormality. Peripancreatic fluid collection at the pancreatic tail stable from previous imaging. He was given Dilaudid 2mg x1, Dilaudid 1mg x2 for pain and his evening dose of Cefepime. Hospital Medicine was called for admission of       Overview/Hospital Course:  Admitted to hospital medicine. Cefepime and MS contin continued, with " addition of scheduled tylenol + toradol and then breakthrough dilaudid. AES consulted again now that imaging findings were stable >4w since initial diagnosis and persistent symptoms. Pending AES intervention.       Interval history: Patient resting comfortably in bed this morning. Remains intermittently nauseated that resolves with phenergan. Pain requiring breakthrough dilaudid every 6H. No other complaints this AM.    Past Medical History:   Diagnosis Date    Acute necrotizing pancreatitis 09/20/2023    Alcohol use disorder 10/05/2023    Asthma     Hepatitis C antibody positive in blood 09/18/2023 9/2023 PCR negative    HIV infection 10/2021    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/2023    Normocytic anemia 09/18/2023    Polycythemia vera 11/28/2021    Receives phlebotomy if Hct>45    Positive CMV IgG serology 09/21/2023    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       Past Surgical History:   Procedure Laterality Date    ESOPHAGOGASTRODUODENOSCOPY N/A 9/18/2023    Procedure: EGD (ESOPHAGOGASTRODUODENOSCOPY);  Surgeon: Kg Cleaning MD;  Location: [address];  Service: Endoscopy;  Laterality: N/A;       Review of patient's allergies indicates:   Allergen Reactions    [place] Swelling    Pcn [penicillins] Hives     Tolerates cephalosporins    Pecan nut Swelling    Soy     Sulfa (sulfonamide antibiotics) Hives       No current facility-administered medications on file prior to encounter.     Current Outpatient Medications on File Prior to Encounter   Medication Sig    morphine (MS CONTIN) 15 MG 12 hr tablet Take 1 tablet (15 mg total) by mouth 2 (two) times daily for 14 days, THEN 1 tablet (15 mg total) once daily for 14 days.    morphine (MSIR) 15 MG tablet Take 1 tablet (15 mg total) by mouth every 6 (six) hours as needed for Pain (severe, breakthrough).    apixaban (ELIQUIS) 5 mg Tab Take 1 tablet (5 mg total) by mouth 2 (two) times daily.    gbptexdag-nsdjhhpw-yenpcek ala (BIKTARVY) -25 mg  (25 kg or greater) Take 1 tablet by mouth once daily.    dextrose 5 % in water (D5W) PgBk 100 mL with ceFEPIme 2 gram SolR 2 g Inject 2 g into the vein every 8 (eight) hours.    promethazine (PHENERGAN) 25 MG tablet Take 25 mg by mouth every 4 (four) hours as needed for Nausea.     Family History       Problem Relation (Age of Onset)    Breast cancer Maternal Grandmother    Cancer Mother, Brother    No Known Problems Father    Ovarian cancer Mother    Pancreatitis Mother          Tobacco Use    Smoking status: Some Days     Types: Cigarettes    Smokeless tobacco: Never   Substance and Sexual Activity    Alcohol use: Yes    Drug use: Never    Sexual activity: Not on file     Review of Systems   Constitutional:  Negative for chills, fatigue and unexpected weight change.   HENT:  Negative for congestion, sinus pain and sore throat.    Eyes:  Negative for redness and visual disturbance.   Respiratory:  Negative for cough, chest tightness and shortness of breath.    Cardiovascular:  Negative for chest pain, palpitations and leg swelling.   Gastrointestinal:  Positive for abdominal pain. Negative for diarrhea, nausea and vomiting.   Genitourinary:  Negative for decreased urine volume, flank pain, hematuria and urgency.   Musculoskeletal:  Negative for arthralgias, back pain and joint swelling.   Neurological:  Positive for weakness. Negative for light-headedness and headaches.     Objective:     Vital Signs (Most Recent):  Temp: 98.1 °F (36.7 °C) (10/22/23 0311)  Pulse: 101 (10/22/23 0311)  Resp: 18 (10/22/23 0427)  BP: 117/82 (10/22/23 0311)  SpO2: 95 % (10/22/23 0311) Vital Signs (24h Range):  Temp:  [98.1 °F (36.7 °C)-98.5 °F (36.9 °C)] 98.1 °F (36.7 °C)  Pulse:  [] 101  Resp:  [16-18] 18  SpO2:  [95 %-99 %] 95 %  BP: (107-136)/(65-87) 117/82     Weight: 68 kg (150 lb)  Body mass index is 27.44 kg/m².     Physical Exam  Constitutional:       General: He is not in acute distress.     Appearance: He is not  toxic-appearing.   HENT:      Head: Normocephalic.      Right Ear: External ear normal.      Left Ear: External ear normal.   Eyes:      General: No scleral icterus.        Right eye: No discharge.         Left eye: No discharge.      Extraocular Movements: Extraocular movements intact.   Cardiovascular:      Rate and Rhythm: Regular rhythm. Tachycardia present.      Pulses: Normal pulses.   Pulmonary:      Effort: Pulmonary effort is normal. No respiratory distress.   Abdominal:      General: Abdomen is flat. Bowel sounds are normal. There is no distension.      Palpations: Abdomen is soft.      Tenderness: There is abdominal tenderness (epigastrium). There is no right CVA tenderness, left CVA tenderness, guarding or rebound.   Musculoskeletal:         General: No swelling.      Right lower leg: No edema.      Left lower leg: No edema.   Skin:     Coloration: Skin is not jaundiced.      Findings: No rash.   Neurological:      General: No focal deficit present.      Mental Status: He is alert and oriented to person, place, and time.   Psychiatric:         Mood and Affect: Mood normal.         Behavior: Behavior normal.                Significant Labs: All pertinent labs within the past 24 hours have been reviewed.  CBC:   Recent Labs   Lab 10/21/23  2321   WBC 9.30   HGB 10.2*   HCT 31.6*          CMP:   Recent Labs   Lab 10/21/23  2321      K 3.3*      CO2 21*   *   BUN 5*   CREATININE 0.6   CALCIUM 8.6*   PROT 6.8   ALBUMIN 3.0*   BILITOT 0.4   ALKPHOS 76   AST 13   ALT 7*   ANIONGAP 13       Lipase:   Recent Labs   Lab 10/21/23  2321   LIPASE 96*         Significant Imaging: I have reviewed all pertinent imaging results/findings within the past 24 hours.      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 Lipase mildly elevated. WBC and lactate WNL. On IV Cefepime for 4wk course. Discharged on MSIR and Morphine for pain control.     - EUS +/- ERCP today   - Continue IVF  - Continue Cefepime - per ID recs on previous admission, plan to treat until 11/2  - NPO, advance as tolerated  - paincontrol - Dilaudid 1mg Q6H PRN; scheduled Tylenol and toradol  - bowel regimen initiated - pericolace BID, miralax PRN.    - pancreatic enzyme supplementation and diet education once diet is advanced           Increased anion gap metabolic acidosis  Admit labs -  AG 13, Delta Gap 3, Delta HCO3 4, Delta-Delta ratio 0.75. RESOLVED following IVF.    - Etiology unknown but chronic; Consider ketoacidosis 2/2 to starvation/decreased PO intake  - Continue to monitor daily CMP's         Hypokalemia  K 3.3 on admission; likely 2/2 to decreased PO intake    - Gave IV K 10meq Q1H for 6 doses   - Replete K PRN       Continuous severe abdominal pain  Likely 2/2 to pancreatitis; Discharged home on Morphine taper which was working well until yesterday morning    - See Chronic Pancreatitis       Alcohol use disorder  Hx of EtOH use disorder. On most recent admission, patient's ethanol negative in setting of pancreatitis flare.   - monitor for signs of withdrawal, though patient denies EtOH use in between recent admissions\      Splenic vein thrombosis  Splenic vein thrombus originally seen on 9/20 imaging, and persistent on most recent CT abdominal imaging. Started on DOAC at last admission, loading dose completed.     - Holding Eliquis for AES intervention today, plan to resume Eliquis 5 mg BID following procedure      Mild intermittent asthma without complication  History of asthma requiring PRN albuterol. Has not recently been using any inhaler treatment.     Normocytic anemia  Admit with hemoglobin 10.2; Baseline ~ 10    Lab Results   Component Value Date    HGB 9.4 (L) 10/23/2023  "    - Daily CBC   - Transfuse hgb <7       Polycythemia vera  Hgb 10.5 on admission  - continue to monitor daily CBC      HIV infection  This patient in known to have HIV+ status (Have detected HIV PCR but never CD4 <200 or AIDS defining illness). Labs reviewed- No results found for: "CD4", No results found for: "HIVDNAPCR". Patient is on HAART. Will continue HAART. Continue to monitor routine labs. Last CD4 count was   Lab Results   Component Value Date    ABSOLUTECD4 1080 10/12/2023     - Continue home Biktarvy         VTE Risk Mitigation (From admission, onward)    None          Discharge Planning   CASTILLO: 10/25/2023     Code Status: Full Code   Is the patient medically ready for discharge?:     Reason for patient still in hospital (select all that apply): Treatment and Consult recommendations  Discharge Plan A: Home with family                  Noah Trinh MD  Department of Hospital Medicine   [facility] ([place] [address])    "

## 2023-10-23 NOTE — ASSESSMENT & PLAN NOTE
Splenic vein thrombus originally seen on 9/20 imaging, and persistent on most recent CT abdominal imaging. Started on DOAC at last admission, loading dose completed.     - Holding Eliquis for AES intervention today, plan to resume Eliquis 5 mg BID following procedure

## 2023-10-23 NOTE — TREATMENT PLAN
Post-Procedure Gastroenterology Treatment Plan:     Tasia Cardona is status post EUS with the following findings:     - There was no sign of significant pathology in the common bile duct.   - There was no sign of significant pathology in the pancreatic head.   - Complex collections along the neck, body, and tail of pancreas (body/tail region likely connected). Not suitable for endoscopic transmural stenting/drainage.     Our recommendations are as follows:     - Pain control.   - OK to advance diet as tolerated.   - Interval CT in 4-6 weeks. Depending on CT findings, may consider EUS drainage and/or ERCP (to assess/treat for possible PD disruption).         Del Quinoens MD, PGY-VI  Gastroenterology Fellow  [facility]

## 2023-10-23 NOTE — ASSESSMENT & PLAN NOTE
Admit with hemoglobin 10.2; Baseline ~ 10    Lab Results   Component Value Date    HGB 9.4 (L) 10/23/2023     - Daily CBC   - Transfuse hgb <7

## 2023-10-23 NOTE — ANESTHESIA PREPROCEDURE EVALUATION
10/23/2023  Tasia Cardona is a 23 y.o., male.    Procedures:        ULTRASOUND, UPPER GI TRACT, ENDOSCOPIC (Abdomen)       ERCP (ENDOSCOPIC RETROGRADE CHOLANGIOPANCREATOGRAPHY) (Abdomen)   Anesthesia type: Gen Natural Airway   Diagnosis: Fluid collection of pancreas [K86.89]     Has had frequent emesis with this cyst, last one cup bilious emesis 2 days ago.    Pre-operative evaluation for Procedure(s) (LRB):  ULTRASOUND, UPPER GI TRACT, ENDOSCOPIC (N/A)  ERCP (ENDOSCOPIC RETROGRADE CHOLANGIOPANCREATOGRAPHY) (N/A)    @[id number]    Encounter Diagnoses   Name Primary?    Tachycardia     Necrotizing pancreatitis     Abdominal pain, unspecified abdominal location     HIV infection, unspecified symptom status     Chest pain     Other chronic pancreatitis Yes    Continuous severe abdominal pain     Hypokalemia     Increased anion gap metabolic acidosis     Splenic vein thrombosis     Mild intermittent asthma without complication     Polycythemia vera     Alcohol use disorder     Normocytic anemia     Fluid collection of pancreas        Review of patient's allergies indicates:   Allergen Reactions    Hamilton Swelling    Pcn [penicillins] Hives     Tolerates cephalosporins    Pecan nut Swelling    Soy     Sulfa (sulfonamide antibiotics) Hives       Medications Prior to Admission   Medication Sig Dispense Refill Last Dose    apixaban (ELIQUIS) 5 mg Tab Take 1 tablet (5 mg total) by mouth 2 (two) times daily. 60 tablet 0 Past Week    iexvtvnei-fcrznnjy-cmqakqf ala (BIKTARVY) -25 mg (25 kg or greater) Take 1 tablet by mouth once daily.   10/23/2023    dextrose 5 % in water (D5W) PgBk 100 mL with ceFEPIme 2 gram SolR 2 g Inject 2 g into the vein every 8 (eight) hours.   10/23/2023    morphine (MS CONTIN) 15 MG 12 hr tablet Take 1 tablet (15 mg total) by mouth 2 (two) times daily for 14  days, THEN 1 tablet (15 mg total) once daily for 14 days. 42 tablet 0 10/23/2023    morphine (MSIR) 15 MG tablet Take 1 tablet (15 mg total) by mouth every 6 (six) hours as needed for Pain (severe, breakthrough). 42 tablet 0 10/21/2023    promethazine (PHENERGAN) 25 MG tablet Take 25 mg by mouth every 4 (four) hours as needed for Nausea.   10/23/2023            No current facility-administered medications on file prior to encounter.     Current Outpatient Medications on File Prior to Encounter   Medication Sig Dispense Refill    apixaban (ELIQUIS) 5 mg Tab Take 1 tablet (5 mg total) by mouth 2 (two) times daily. 60 tablet 0    yoxepobqv-gkljuotj-ihzdrmg ala (BIKTARVY) -25 mg (25 kg or greater) Take 1 tablet by mouth once daily.      dextrose 5 % in water (D5W) PgBk 100 mL with ceFEPIme 2 gram SolR 2 g Inject 2 g into the vein every 8 (eight) hours.      morphine (MS CONTIN) 15 MG 12 hr tablet Take 1 tablet (15 mg total) by mouth 2 (two) times daily for 14 days, THEN 1 tablet (15 mg total) once daily for 14 days. 42 tablet 0    morphine (MSIR) 15 MG tablet Take 1 tablet (15 mg total) by mouth every 6 (six) hours as needed for Pain (severe, breakthrough). 42 tablet 0    promethazine (PHENERGAN) 25 MG tablet Take 25 mg by mouth every 4 (four) hours as needed for Nausea.         Past Medical History:  09/20/2023: Acute necrotizing pancreatitis  10/05/2023: Alcohol use disorder  No date: Asthma  09/18/2023: Hepatitis C antibody positive in blood      Comment:  9/2023 PCR negative  10/2021: HIV infection  09/18/2023: Corinne-Chauhan tear  09/18/2023: Normocytic anemia  11/28/2021: Polycythemia vera      Comment:  Receives phlebotomy if Hct>45  09/21/2023: Positive CMV IgG serology  09/20/2023: Splenic vein thrombosis    Past Surgical History:   Procedure Laterality Date    ESOPHAGOGASTRODUODENOSCOPY N/A 9/18/2023    Procedure: EGD (ESOPHAGOGASTRODUODENOSCOPY);  Surgeon: Kg Cleaning MD;  Location: [facility]  "ENDO (2ND FLR);  Service: Endoscopy;  Laterality: N/A;       Social History     Tobacco Use   Smoking Status Former    Types: Cigarettes   Smokeless Tobacco Never       Social History     Substance and Sexual Activity   Alcohol Use Not Currently       Physical Activity: Inactive (9/18/2023)    Exercise Vital Sign     Days of Exercise per Week: 0 days     Minutes of Exercise per Session: 0 min         Recent Labs     10/23/23  0552   HCT 30.7*     Recent Labs     10/23/23  0552        Recent Labs     10/23/23  0552   K 3.8     Recent Labs     10/23/23  0552   CREATININE 0.6     Recent Labs     10/23/23  0552   GLU 98     No results for input(s): "PT" in the last 72 hours.                    Pre-op Assessment    I have reviewed the Patient Summary Reports.       I have reviewed the Medications.     Review of Systems  Anesthesia Hx:  No problems with previous Anesthesia  History of prior surgery of interest to airway management or planning: Previous anesthesia: General   Social:  Smoker, Social Alcohol Use Alcohol use disorder   Hematology/Oncology:     Oncology Normal    -- Anemia: Hematology Comments: Polycythemia vera, HIV infection, on  eliquis for splenic vein thrombosis    Cardiovascular:  Cardiovascular Normal Exercise tolerance: good  Denies Hypertension.  Denies MI.    Denies Angina.    Pulmonary:   Denies COPD. Asthma mild  Denies Shortness of breath.    Renal/:  Renal/ Normal  Denies Chronic Renal Disease.     Hepatic/GI:   Denies Hepatitis. Pancreatic abscess  Chronic pancreatitis  Croinne Chauhan tear   Musculoskeletal:  Musculoskeletal Normal    Neurological:  Neurology Normal Denies TIA.  Denies CVA. Denies Seizures.    Endocrine:  Endocrine Normal Denies Diabetes.    Dermatological:  Skin Normal    Psych:  Psychiatric Normal           Physical Exam  General: Well nourished, Cooperative, Alert and Oriented    Airway:  Mallampati: II   Mouth Opening: Normal  TM Distance: Normal  Tongue: " Normal  Neck ROM: Normal ROM    Dental:  Intact        Anesthesia Plan  Type of Anesthesia, risks & benefits discussed:    Anesthesia Type: Gen Natural Airway  Intra-op Monitoring Plan: Standard ASA Monitors  Post Op Pain Control Plan: multimodal analgesia and IV/PO Opioids PRN  Induction:  IV  Airway Plan: , Post-Induction  Informed Consent: Informed consent signed with the Patient and all parties understand the risks and agree with anesthesia plan.  All questions answered.   ASA Score: 3    Ready For Surgery From Anesthesia Perspective.     .    Performed by: Lovely Veliz CRNA  Authorized by: Leighann Gallo MD    Intubation:     Induction:  Rapid sequence induction    Intubated:  Postinduction    Mask Ventilation:  Not attempted    Attempts:  1    Attempted By:  CRNA    Method of Intubation:  Video laryngoscopy    Blade:  Gomez 3    Laryngeal View Grade: Grade I - full view of cords      Difficult Airway Encountered?: No      Complications:  None    Airway Device:  Oral endotracheal tube    Airway Device Size:  7.5    Style/Cuff Inflation:  Cuffed (inflated to minimal occlusive pressure)    Inflation Amount (mL):  5    Tube secured:  21    Secured at:  The lips    Placement Verified By:  Capnometry    Complicating Factors:  None    Findings Post-Intubation:  BS equal bilateral and atraumatic/condition of teeth unchanged

## 2023-10-23 NOTE — ASSESSMENT & PLAN NOTE
Admit labs -  AG 13, Delta Gap 3, Delta HCO3 4, Delta-Delta ratio 0.75. RESOLVED following IVF.    - Etiology unknown but chronic; Consider ketoacidosis 2/2 to starvation/decreased PO intake  - Continue to monitor daily CMP's

## 2023-10-23 NOTE — ASSESSMENT & PLAN NOTE
Hx of EtOH use disorder. On most recent admission, patient's ethanol negative in setting of pancreatitis flare.   - monitor for signs of withdrawal, though patient denies EtOH use in between recent admissions\

## 2023-10-23 NOTE — ANESTHESIA POSTPROCEDURE EVALUATION
Anesthesia Post Evaluation    Patient: Tasia Cardona    Procedure(s) Performed: Procedure(s) (LRB):  ULTRASOUND, UPPER GI TRACT, ENDOSCOPIC (N/A)  ERCP (ENDOSCOPIC RETROGRADE CHOLANGIOPANCREATOGRAPHY) (N/A)    Final Anesthesia Type: general      Patient location during evaluation: PACU  Patient participation: Yes- Able to Participate  Level of consciousness: awake and alert and oriented  Post-procedure vital signs: reviewed and stable  Pain management: adequate  Airway patency: patent    PONV status at discharge: No PONV  Anesthetic complications: no      Cardiovascular status: stable  Respiratory status: unassisted, spontaneous ventilation and room air  Hydration status: euvolemic  Follow-up not needed.          Vitals Value Taken Time   /72 10/23/23 1832   Temp 36.7 °C (98.1 °F) 10/23/23 1812   Pulse 86 10/23/23 1832   Resp 16 10/23/23 1832   SpO2 94 % 10/23/23 1832   Vitals shown include unvalidated device data.      Event Time   Out of Recovery 18:34:49         Pain/Maoy Score: Pain Rating Prior to Med Admin: 8 (10/23/2023  1:55 PM)  Pain Rating Post Med Admin: 6 (10/23/2023  2:25 PM)  Mayo Score: 10 (10/23/2023  6:30 PM)

## 2023-10-23 NOTE — NURSING
Patient awaiting ERCP, updated him on status, placed in transport at 1514. Gown provided to patient, educated on only going down in gown.

## 2023-10-24 PROBLEM — K86.3 PANCREATIC PSEUDOCYST/CYST: Status: ACTIVE | Noted: 2023-10-24

## 2023-10-24 PROBLEM — E87.29 INCREASED ANION GAP METABOLIC ACIDOSIS: Status: RESOLVED | Noted: 2023-10-22 | Resolved: 2023-10-24

## 2023-10-24 PROBLEM — K86.2 PANCREATIC PSEUDOCYST/CYST: Status: ACTIVE | Noted: 2023-10-24

## 2023-10-24 LAB
ALBUMIN SERPL BCP-MCNC: 2.5 G/DL (ref 3.5–5.2)
ALP SERPL-CCNC: 73 U/L (ref 55–135)
ALT SERPL W/O P-5'-P-CCNC: 7 U/L (ref 10–44)
ANION GAP SERPL CALC-SCNC: 9 MMOL/L (ref 8–16)
AST SERPL-CCNC: 11 U/L (ref 10–40)
BASOPHILS # BLD AUTO: 0.04 K/UL (ref 0–0.2)
BASOPHILS NFR BLD: 0.7 % (ref 0–1.9)
BILIRUB SERPL-MCNC: 0.2 MG/DL (ref 0.1–1)
BUN SERPL-MCNC: 5 MG/DL (ref 6–20)
CALCIUM SERPL-MCNC: 8.2 MG/DL (ref 8.7–10.5)
CHLORIDE SERPL-SCNC: 110 MMOL/L (ref 95–110)
CO2 SERPL-SCNC: 20 MMOL/L (ref 23–29)
CREAT SERPL-MCNC: 0.6 MG/DL (ref 0.5–1.4)
DIFFERENTIAL METHOD: ABNORMAL
EOSINOPHIL # BLD AUTO: 0.6 K/UL (ref 0–0.5)
EOSINOPHIL NFR BLD: 10.3 % (ref 0–8)
ERYTHROCYTE [DISTWIDTH] IN BLOOD BY AUTOMATED COUNT: 18 % (ref 11.5–14.5)
EST. GFR  (NO RACE VARIABLE): >60 ML/MIN/1.73 M^2
GLUCOSE SERPL-MCNC: 109 MG/DL (ref 70–110)
HCT VFR BLD AUTO: 31.4 % (ref 40–54)
HGB BLD-MCNC: 9.5 G/DL (ref 14–18)
IMM GRANULOCYTES # BLD AUTO: 0.02 K/UL (ref 0–0.04)
IMM GRANULOCYTES NFR BLD AUTO: 0.3 % (ref 0–0.5)
LYMPHOCYTES # BLD AUTO: 1.8 K/UL (ref 1–4.8)
LYMPHOCYTES NFR BLD: 29.9 % (ref 18–48)
MAGNESIUM SERPL-MCNC: 1.9 MG/DL (ref 1.6–2.6)
MCH RBC QN AUTO: 27.7 PG (ref 27–31)
MCHC RBC AUTO-ENTMCNC: 30.3 G/DL (ref 32–36)
MCV RBC AUTO: 92 FL (ref 82–98)
MONOCYTES # BLD AUTO: 0.6 K/UL (ref 0.3–1)
MONOCYTES NFR BLD: 9.3 % (ref 4–15)
NEUTROPHILS # BLD AUTO: 2.9 K/UL (ref 1.8–7.7)
NEUTROPHILS NFR BLD: 49.5 % (ref 38–73)
NRBC BLD-RTO: 0 /100 WBC
PHOSPHATE SERPL-MCNC: 4.5 MG/DL (ref 2.7–4.5)
PLATELET # BLD AUTO: 311 K/UL (ref 150–450)
PMV BLD AUTO: 9.9 FL (ref 9.2–12.9)
POTASSIUM SERPL-SCNC: 3.5 MMOL/L (ref 3.5–5.1)
PROT SERPL-MCNC: 5.9 G/DL (ref 6–8.4)
RBC # BLD AUTO: 3.43 M/UL (ref 4.6–6.2)
SODIUM SERPL-SCNC: 139 MMOL/L (ref 136–145)
WBC # BLD AUTO: 5.92 K/UL (ref 3.9–12.7)

## 2023-10-24 PROCEDURE — 83735 ASSAY OF MAGNESIUM: CPT

## 2023-10-24 PROCEDURE — 99233 PR SUBSEQUENT HOSPITAL CARE,LEVL III: ICD-10-PCS | Mod: ,,,

## 2023-10-24 PROCEDURE — 63600175 PHARM REV CODE 636 W HCPCS

## 2023-10-24 PROCEDURE — 25000003 PHARM REV CODE 250

## 2023-10-24 PROCEDURE — 25000003 PHARM REV CODE 250: Performed by: STUDENT IN AN ORGANIZED HEALTH CARE EDUCATION/TRAINING PROGRAM

## 2023-10-24 PROCEDURE — 99233 SBSQ HOSP IP/OBS HIGH 50: CPT | Mod: ,,, | Performed by: INTERNAL MEDICINE

## 2023-10-24 PROCEDURE — 85025 COMPLETE CBC W/AUTO DIFF WBC: CPT

## 2023-10-24 PROCEDURE — 99233 PR SUBSEQUENT HOSPITAL CARE,LEVL III: ICD-10-PCS | Mod: ,,, | Performed by: INTERNAL MEDICINE

## 2023-10-24 PROCEDURE — 36415 COLL VENOUS BLD VENIPUNCTURE: CPT

## 2023-10-24 PROCEDURE — 99233 SBSQ HOSP IP/OBS HIGH 50: CPT | Mod: ,,,

## 2023-10-24 PROCEDURE — 12000002 HC ACUTE/MED SURGE SEMI-PRIVATE ROOM

## 2023-10-24 PROCEDURE — 80053 COMPREHEN METABOLIC PANEL: CPT

## 2023-10-24 PROCEDURE — 84100 ASSAY OF PHOSPHORUS: CPT

## 2023-10-24 PROCEDURE — 25000003 PHARM REV CODE 250: Performed by: INTERNAL MEDICINE

## 2023-10-24 PROCEDURE — 63600175 PHARM REV CODE 636 W HCPCS: Performed by: STUDENT IN AN ORGANIZED HEALTH CARE EDUCATION/TRAINING PROGRAM

## 2023-10-24 RX ORDER — GABAPENTIN 300 MG/1
300 CAPSULE ORAL 3 TIMES DAILY
Status: DISCONTINUED | OUTPATIENT
Start: 2023-10-24 | End: 2023-10-26

## 2023-10-24 RX ORDER — HYDROMORPHONE HYDROCHLORIDE 2 MG/ML
2 INJECTION, SOLUTION INTRAMUSCULAR; INTRAVENOUS; SUBCUTANEOUS EVERY 4 HOURS PRN
Status: COMPLETED | OUTPATIENT
Start: 2023-10-24 | End: 2023-10-27

## 2023-10-24 RX ADMIN — HYDROMORPHONE HYDROCHLORIDE 2 MG: 2 INJECTION, SOLUTION INTRAMUSCULAR; INTRAVENOUS; SUBCUTANEOUS at 04:10

## 2023-10-24 RX ADMIN — BICTEGRAVIR SODIUM, EMTRICITABINE, AND TENOFOVIR ALAFENAMIDE FUMARATE 1 TABLET: 50; 200; 25 TABLET ORAL at 09:10

## 2023-10-24 RX ADMIN — MORPHINE SULFATE 15 MG: 15 TABLET, EXTENDED RELEASE ORAL at 09:10

## 2023-10-24 RX ADMIN — ACETAMINOPHEN 1000 MG: 500 TABLET ORAL at 09:10

## 2023-10-24 RX ADMIN — HYDROMORPHONE HYDROCHLORIDE 2 MG: 2 INJECTION, SOLUTION INTRAMUSCULAR; INTRAVENOUS; SUBCUTANEOUS at 08:10

## 2023-10-24 RX ADMIN — APIXABAN 5 MG: 5 TABLET, FILM COATED ORAL at 08:10

## 2023-10-24 RX ADMIN — ACETAMINOPHEN 1000 MG: 500 TABLET ORAL at 02:10

## 2023-10-24 RX ADMIN — PANTOPRAZOLE SODIUM 40 MG: 40 TABLET, DELAYED RELEASE ORAL at 08:10

## 2023-10-24 RX ADMIN — KETOROLAC TROMETHAMINE 30 MG: 30 INJECTION INTRAMUSCULAR; INTRAVENOUS at 08:10

## 2023-10-24 RX ADMIN — TOPICAL ANESTHETIC 1 EACH: 200 SPRAY DENTAL; PERIODONTAL at 12:10

## 2023-10-24 RX ADMIN — CEFEPIME 2 G: 2 INJECTION, POWDER, FOR SOLUTION INTRAVENOUS at 04:10

## 2023-10-24 RX ADMIN — APIXABAN 5 MG: 5 TABLET, FILM COATED ORAL at 09:10

## 2023-10-24 RX ADMIN — ONDANSETRON 4 MG: 2 INJECTION INTRAMUSCULAR; INTRAVENOUS at 02:10

## 2023-10-24 RX ADMIN — HYDROMORPHONE HYDROCHLORIDE 2 MG: 2 INJECTION, SOLUTION INTRAMUSCULAR; INTRAVENOUS; SUBCUTANEOUS at 06:10

## 2023-10-24 RX ADMIN — PANCRELIPASE 1 CAPSULE: 30000; 6000; 19000 CAPSULE, DELAYED RELEASE PELLETS ORAL at 12:10

## 2023-10-24 RX ADMIN — CEFEPIME 2 G: 2 INJECTION, POWDER, FOR SOLUTION INTRAVENOUS at 09:10

## 2023-10-24 RX ADMIN — GABAPENTIN 300 MG: 300 CAPSULE ORAL at 08:10

## 2023-10-24 RX ADMIN — HYDROMORPHONE HYDROCHLORIDE 2 MG: 2 INJECTION, SOLUTION INTRAMUSCULAR; INTRAVENOUS; SUBCUTANEOUS at 12:10

## 2023-10-24 RX ADMIN — PANCRELIPASE 1 CAPSULE: 30000; 6000; 19000 CAPSULE, DELAYED RELEASE PELLETS ORAL at 05:10

## 2023-10-24 RX ADMIN — CEFEPIME 2 G: 2 INJECTION, POWDER, FOR SOLUTION INTRAVENOUS at 12:10

## 2023-10-24 RX ADMIN — Medication 6 MG: at 12:10

## 2023-10-24 RX ADMIN — GABAPENTIN 300 MG: 300 CAPSULE ORAL at 02:10

## 2023-10-24 RX ADMIN — SENNOSIDES AND DOCUSATE SODIUM 1 TABLET: 50; 8.6 TABLET ORAL at 12:10

## 2023-10-24 RX ADMIN — FOLIC ACID 1 MG: 1 TABLET ORAL at 09:10

## 2023-10-24 RX ADMIN — PANTOPRAZOLE SODIUM 40 MG: 40 TABLET, DELAYED RELEASE ORAL at 09:10

## 2023-10-24 RX ADMIN — KETOROLAC TROMETHAMINE 30 MG: 30 INJECTION INTRAMUSCULAR; INTRAVENOUS at 09:10

## 2023-10-24 RX ADMIN — PROMETHAZINE HYDROCHLORIDE 6.25 MG: 25 INJECTION INTRAMUSCULAR; INTRAVENOUS at 05:10

## 2023-10-24 NOTE — NURSING
Patient arrived back to unit in stable condition, diet placed and dietary called for a tray. Patient also requested pain med regimen times changed to more frequent. Will pass on to night team.

## 2023-10-24 NOTE — ASSESSMENT & PLAN NOTE
Splenic vein thrombus originally seen on 9/20 imaging, and persistent on most recent CT abdominal imaging. Started on DOAC at last admission, loading dose completed.     - Resume Eliquis 5 mg BID following procedure

## 2023-10-24 NOTE — ASSESSMENT & PLAN NOTE
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 Lipase mildly elevated. WBC and lactate WNL. On IV Cefepime for 4wk course. Discharged on MSIR and Morphine for pain control.     EUS findings from 10/23 are as follows:  "There was no sign of significant pathology in the common bile duct.   There was no sign of significant pathology in the pancreatic head.   Complex collections along the neck, body, and tail of pancreas (body/tail region likely connected). Not suitable for endoscopic transmural stenting/drainage.     Recommendations:  - Pain control.   - OK to advance diet as tolerated.   - Interval CT in 4-6 weeks. Depending on CT findings, may consider EUS drainage and/or ERCP (to assess/treat for possible PD disruption).   "

## 2023-10-24 NOTE — PROGRESS NOTES
[facility] ([place] [id number])  Gastroenterology  Progress Note    Patient Name: Tasia Cardona  MRN: [id number]  Admission Date: 10/21/2023  Hospital Length of Stay: 2 days  Code Status: Full Code   Attending Provider: Srini Mtz MD  Consulting Provider: CHARITY GARCIA NP  Primary Care Physician: iPna Jones NP  Principal Problem: Other chronic pancreatitis        Subjective:     Interval History:   - pain still an issue     Review of Systems   Constitutional:  Positive for activity change, appetite change and fatigue. Negative for chills and fever.   HENT:  Negative for trouble swallowing.    Respiratory:  Negative for cough and shortness of breath.    Cardiovascular:  Negative for chest pain.   Gastrointestinal:  Positive for abdominal pain. Negative for diarrhea, nausea and vomiting.     Objective:     Vital Signs (Most Recent):  Temp: 98.5 °F (36.9 °C) (10/24/23 0822)  Pulse: 85 (10/24/23 0822)  Resp: 17 (10/24/23 0933)  BP: 118/75 (10/24/23 0822)  SpO2: (!) 94 % (10/24/23 0822) Vital Signs (24h Range):  Temp:  [98 °F (36.7 °C)-98.5 °F (36.9 °C)] 98.5 °F (36.9 °C)  Pulse:  [] 85  Resp:  [15-18] 17  SpO2:  [92 %-98 %] 94 %  BP: ()/(59-77) 118/75     Weight: 68 kg (150 lb) (10/23/23 1555)  Body mass index is 21.52 kg/m².      Intake/Output Summary (Last 24 hours) at 10/24/2023 1023  Last data filed at 10/24/2023 0929  Gross per 24 hour   Intake 1620 ml   Output --   Net 1620 ml       Lines/Drains/Airways       Peripheral Intravenous Line  Duration                  Midline Catheter Insertion/Assessment  - Single Lumen Right basilic vein (medial side of arm) -- days         Midline Catheter Insertion/Assessment  - Single Lumen 10/10/23 1120 Right brachial vein 18g x 10cm 13 days                     Physical Exam  Vitals and nursing note reviewed.   Constitutional:       Appearance: He is not ill-appearing.   Cardiovascular:      Rate and Rhythm: Normal rate and regular rhythm.  "     Pulses: Normal pulses.      Heart sounds: No murmur heard.  Pulmonary:      Effort: Pulmonary effort is normal. No respiratory distress.   Abdominal:      General: Bowel sounds are normal. There is no distension.      Palpations: Abdomen is soft.      Tenderness: There is abdominal tenderness.   Skin:     General: Skin is warm and dry.      Capillary Refill: Capillary refill takes 2 to 3 seconds.      Coloration: Skin is not jaundiced or pale.   Neurological:      Mental Status: He is alert and oriented to person, place, and time.          Significant Labs:  CBC:   Recent Labs   Lab 10/23/23  0552 10/24/23  0357   WBC 4.37 5.92   HGB 9.4* 9.5*   HCT 30.7* 31.4*    311     CMP:   Recent Labs   Lab 10/24/23  0357      CALCIUM 8.2*   ALBUMIN 2.5*   PROT 5.9*      K 3.5   CO2 20*      BUN 5*   CREATININE 0.6   ALKPHOS 73   ALT 7*   AST 11   BILITOT 0.2     All pertinent lab results from the last 24 hours have been reviewed.      Significant Imaging:  Imaging results within the past 24 hours have been reviewed.    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 Lipase mildly elevated. WBC and lactate WNL. On IV Cefepime for 4wk course. Discharged on MSIR and Morphine for pain control.     EUS findings from 10/23 are as follows:  "There was no sign of significant pathology in the common bile duct.   There was no sign of significant pathology in the pancreatic head.   Complex collections along the neck, body, and tail of pancreas (body/tail region likely connected). Not suitable for endoscopic transmural stenting/drainage.     Recommendations:  - Pain control.   - OK to advance diet as tolerated.   - Interval CT in 4-6 weeks. Depending on CT " findings, may consider EUS drainage and/or ERCP (to assess/treat for possible PD disruption).         Thank you for your consult. I will sign off. Please contact us if you have any additional questions.    CHARITY GARCIA NP  Gastroenterology  [facility] ([facility]

## 2023-10-24 NOTE — SUBJECTIVE & OBJECTIVE
Interval History: Tolerated EUS well, still having abdominal pain. Requesting pain medication increase today for intractable abdominal pain. Vitals stable.     Review of Systems   Constitutional:  Negative for chills, fatigue and fever.   HENT:  Negative for congestion and sore throat.    Respiratory:  Negative for cough and shortness of breath.    Cardiovascular:  Negative for chest pain.   Gastrointestinal:  Positive for abdominal pain and nausea. Negative for constipation, diarrhea and vomiting.   Genitourinary:  Negative for dysuria and flank pain.   Musculoskeletal:  Negative for joint swelling.   Neurological:  Negative for dizziness and headaches.   Psychiatric/Behavioral:  Negative for confusion. The patient is not nervous/anxious.      Objective:     Vital Signs (Most Recent):  Temp: 98.8 °F (37.1 °C) (10/24/23 1147)  Pulse: 77 (10/24/23 1147)  Resp: 18 (10/24/23 1221)  BP: 116/72 (10/24/23 1147)  SpO2: 95 % (10/24/23 1147) Vital Signs (24h Range):  Temp:  [98 °F (36.7 °C)-98.8 °F (37.1 °C)] 98.8 °F (37.1 °C)  Pulse:  [] 77  Resp:  [15-18] 18  SpO2:  [92 %-98 %] 95 %  BP: ()/(59-77) 116/72     Weight: 68 kg (150 lb)  Body mass index is 21.52 kg/m².    Intake/Output Summary (Last 24 hours) at 10/24/2023 1501  Last data filed at 10/24/2023 1415  Gross per 24 hour   Intake 2110.62 ml   Output --   Net 2110.62 ml         Physical Exam  Constitutional:       Appearance: Normal appearance.   HENT:      Head: Normocephalic and atraumatic.      Nose: Nose normal.      Mouth/Throat:      Mouth: Mucous membranes are moist.   Eyes:      Extraocular Movements: Extraocular movements intact.      Pupils: Pupils are equal, round, and reactive to light.   Cardiovascular:      Rate and Rhythm: Normal rate and regular rhythm.   Pulmonary:      Effort: Pulmonary effort is normal.      Breath sounds: Normal breath sounds.   Abdominal:      General: Abdomen is flat. Bowel sounds are normal.      Tenderness: There is  abdominal tenderness (lower quadrants).   Musculoskeletal:         General: Normal range of motion.      Cervical back: Normal range of motion.   Skin:     General: Skin is warm and dry.   Neurological:      General: No focal deficit present.      Mental Status: He is alert and oriented to person, place, and time.   Psychiatric:         Mood and Affect: Mood normal.         Behavior: Behavior normal.             Significant Labs: All pertinent labs within the past 24 hours have been reviewed.    Significant Imaging: I have reviewed all pertinent imaging results/findings within the past 24 hours.

## 2023-10-24 NOTE — SUBJECTIVE & OBJECTIVE
Subjective:     Interval History:   - pain still an issue     Review of Systems   Constitutional:  Positive for activity change, appetite change and fatigue. Negative for chills and fever.   HENT:  Negative for trouble swallowing.    Respiratory:  Negative for cough and shortness of breath.    Cardiovascular:  Negative for chest pain.   Gastrointestinal:  Positive for abdominal pain. Negative for diarrhea, nausea and vomiting.     Objective:     Vital Signs (Most Recent):  Temp: 98.5 °F (36.9 °C) (10/24/23 0822)  Pulse: 85 (10/24/23 0822)  Resp: 17 (10/24/23 0933)  BP: 118/75 (10/24/23 0822)  SpO2: (!) 94 % (10/24/23 0822) Vital Signs (24h Range):  Temp:  [98 °F (36.7 °C)-98.5 °F (36.9 °C)] 98.5 °F (36.9 °C)  Pulse:  [] 85  Resp:  [15-18] 17  SpO2:  [92 %-98 %] 94 %  BP: ()/(59-77) 118/75     Weight: 68 kg (150 lb) (10/23/23 1555)  Body mass index is 21.52 kg/m².      Intake/Output Summary (Last 24 hours) at 10/24/2023 1023  Last data filed at 10/24/2023 0929  Gross per 24 hour   Intake 1620 ml   Output --   Net 1620 ml       Lines/Drains/Airways       Peripheral Intravenous Line  Duration                  Midline Catheter Insertion/Assessment  - Single Lumen Right basilic vein (medial side of arm) -- days         Midline Catheter Insertion/Assessment  - Single Lumen 10/10/23 1120 Right brachial vein 18g x 10cm 13 days                     Physical Exam  Vitals and nursing note reviewed.   Constitutional:       Appearance: He is not ill-appearing.   Cardiovascular:      Rate and Rhythm: Normal rate and regular rhythm.      Pulses: Normal pulses.      Heart sounds: No murmur heard.  Pulmonary:      Effort: Pulmonary effort is normal. No respiratory distress.   Abdominal:      General: Bowel sounds are normal. There is no distension.      Palpations: Abdomen is soft.      Tenderness: There is abdominal tenderness.   Skin:     General: Skin is warm and dry.      Capillary Refill: Capillary refill takes 2  to 3 seconds.      Coloration: Skin is not jaundiced or pale.   Neurological:      Mental Status: He is alert and oriented to person, place, and time.          Significant Labs:  CBC:   Recent Labs   Lab 10/23/23  0552 10/24/23  0357   WBC 4.37 5.92   HGB 9.4* 9.5*   HCT 30.7* 31.4*    311     CMP:   Recent Labs   Lab 10/24/23  0357      CALCIUM 8.2*   ALBUMIN 2.5*   PROT 5.9*      K 3.5   CO2 20*      BUN 5*   CREATININE 0.6   ALKPHOS 73   ALT 7*   AST 11   BILITOT 0.2     All pertinent lab results from the last 24 hours have been reviewed.      Significant Imaging:  Imaging results within the past 24 hours have been reviewed.

## 2023-10-24 NOTE — PLAN OF CARE
Patient AAOX4, makes needs known. Fair appetite for meals. Patient ambulates indep in room. Midline dressing change completed today. Lipase and neurontin started as new meds this afternoon. Pain and nausea control. IV abx as ordered. Senna PRN tab given for constipation. Bed low and locked and call light in reach.     Problem: Adult Inpatient Plan of Care  Goal: Plan of Care Review  Outcome: Ongoing, Progressing  Goal: Patient-Specific Goal (Individualized)  Outcome: Ongoing, Progressing  Goal: Absence of Hospital-Acquired Illness or Injury  Outcome: Ongoing, Progressing  Goal: Optimal Comfort and Wellbeing  Outcome: Ongoing, Progressing  Goal: Readiness for Transition of Care  Outcome: Ongoing, Progressing     Problem: Infection  Goal: Absence of Infection Signs and Symptoms  Outcome: Ongoing, Progressing     Problem: Pain Acute  Goal: Acceptable Pain Control and Functional Ability  Outcome: Ongoing, Progressing     Problem: Nausea and Vomiting  Goal: Fluid and Electrolyte Balance  Outcome: Ongoing, Progressing

## 2023-10-24 NOTE — PROGRESS NOTES
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 He has been in and out of the hospital for the last two weeks. His most recent discharge was on10/19 after admission for recurrent symptoms of his acute necrotizing pancreatitis. He was discharged home on MSIR and Morphine which was working well until yesterday morning (10/21) when he started having increased abdominal pain w/nausea. He attempted to eat something but vomited. He denies any recent alcohol or tobacco use. He reports eating normally since discharge. Last meal was a grilled cheese yesterday for dinner. Last BM was loose and "oily" but normal color w/out blood present. He was evaluated by [facility] on his 10/5 admission who recommended against any intervention on the pancreatic collection due to immaturity. He has been on IV Cefepime since 10/5 with possible end date 11/2.     In the ED, he was afebrile and tachycardic to the 130s. Labs were notable for K 3.3, Bicarb 21, BUN 5, Alb 3.0, Lipase 96. Lactate WNL. Repeat CT A/P showed small R pleural effusion but no acute intra-abdominopelvic abnormality. Peripancreatic fluid collection at the pancreatic tail stable from previous imaging. He was given Dilaudid 2mg x1, Dilaudid 1mg x2 for pain and his evening dose of Cefepime. Hospital Medicine was called for admission of       Overview/Hospital Course:  Admitted to hospital medicine. Cefepime and MS contin continued, with " "addition of scheduled tylenol + toradol and then breakthrough dilaudid. [facility] consulted again now that imaging findings were stable >4w since initial diagnosis and persistent symptoms. Underwent EUS with findings of stable fluid collection which was not drained ("Complex collections along the neck, body, and tail of pancreas (body/tail region likely connected). Not suitable for endoscopic transmural stenting/drainage.") He will need follow-up CT scan in 4-6 weeks. Patient will need adequate pain control prior to discharge. Cefepime to complete 11/12/23.       Interval History: Tolerated EUS well, still having abdominal pain. Requesting pain medication increase today for intractable abdominal pain. Vitals stable.     Review of Systems   Constitutional:  Negative for chills, fatigue and fever.   HENT:  Negative for congestion and sore throat.    Respiratory:  Negative for cough and shortness of breath.    Cardiovascular:  Negative for chest pain.   Gastrointestinal:  Positive for abdominal pain and nausea. Negative for constipation, diarrhea and vomiting.   Genitourinary:  Negative for dysuria and flank pain.   Musculoskeletal:  Negative for joint swelling.   Neurological:  Negative for dizziness and headaches.   Psychiatric/Behavioral:  Negative for confusion. The patient is not nervous/anxious.      Objective:     Vital Signs (Most Recent):  Temp: 98.8 °F (37.1 °C) (10/24/23 1147)  Pulse: 77 (10/24/23 1147)  Resp: 18 (10/24/23 1221)  BP: 116/72 (10/24/23 1147)  SpO2: 95 % (10/24/23 1147) Vital Signs (24h Range):  Temp:  [98 °F (36.7 °C)-98.8 °F (37.1 °C)] 98.8 °F (37.1 °C)  Pulse:  [] 77  Resp:  [15-18] 18  SpO2:  [92 %-98 %] 95 %  BP: ()/(59-77) 116/72     Weight: 68 kg (150 lb)  Body mass index is 21.52 kg/m².    Intake/Output Summary (Last 24 hours) at 10/24/2023 1501  Last data filed at 10/24/2023 1415  Gross per 24 hour   Intake 2110.62 ml   Output --   Net 2110.62 ml         Physical " "Exam  Constitutional:       Appearance: Normal appearance.   HENT:      Head: Normocephalic and atraumatic.      Nose: Nose normal.      Mouth/Throat:      Mouth: Mucous membranes are moist.   Eyes:      Extraocular Movements: Extraocular movements intact.      Pupils: Pupils are equal, round, and reactive to light.   Cardiovascular:      Rate and Rhythm: Normal rate and regular rhythm.   Pulmonary:      Effort: Pulmonary effort is normal.      Breath sounds: Normal breath sounds.   Abdominal:      General: Abdomen is flat. Bowel sounds are normal.      Tenderness: There is abdominal tenderness (lower quadrants).   Musculoskeletal:         General: Normal range of motion.      Cervical back: Normal range of motion.   Skin:     General: Skin is warm and dry.   Neurological:      General: No focal deficit present.      Mental Status: He is alert and oriented to person, place, and time.   Psychiatric:         Mood and Affect: Mood normal.         Behavior: Behavior normal.             Significant Labs: All pertinent labs within the past 24 hours have been reviewed.    Significant Imaging: I have reviewed all pertinent imaging results/findings within the past 24 hours.      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 Lipase mildly elevated. WBC and lactate WNL. On IV Cefepime for 4wk course. Discharged on MSIR and Morphine for pain control.     - EUS with "complex collections along the neck, body, and tail of pancreas. Not suitable for endoscopic transmural stenting/drainage". Follow CT in 4-6 weeks.   - Patient tolerating PO now. Will start creon, low fat diet.  - Continue Cefepime - per ID recs on previous admission, plan to treat until 11/2  - paincontrol - Dilaudid 1mg " "Q4H PRN; scheduled Tylenol and toradol, gabapentin  - bowel regimen initiated - pericolace BID, miralax PRN.           Pancreatic pseudocyst/cyst  F/u CT scan in 4 weeks per GI. Continue pain control, creon, low fat diet.    Hypokalemia  K 3.3 on admission; likely 2/2 to decreased PO intake    - Gave IV K 10meq Q1H for 6 doses   - Replete K PRN       Continuous severe abdominal pain  Likely 2/2 to pancreatitis; Discharged home on Morphine taper which was working well until yesterday morning    - See Chronic Pancreatitis       Alcohol use disorder  Hx of EtOH use disorder. On most recent admission, patient's ethanol negative in setting of pancreatitis flare.   - monitor for signs of withdrawal, though patient denies EtOH use in between recent admissions      Splenic vein thrombosis  Splenic vein thrombus originally seen on 9/20 imaging, and persistent on most recent CT abdominal imaging. Started on DOAC at last admission, loading dose completed.     - Resume Eliquis 5 mg BID following procedure      Mild intermittent asthma without complication  History of asthma requiring PRN albuterol. Has not recently been using any inhaler treatment.     Normocytic anemia  Admit with hemoglobin 10.2; Baseline ~ 10    Lab Results   Component Value Date    HGB 9.5 (L) 10/24/2023     - Daily CBC   - Transfuse hgb <7       Polycythemia vera  Hgb 10.5 on admission  - continue to monitor daily CBC      HIV infection  This patient in known to have HIV+ status (Have detected HIV PCR but never CD4 <200 or AIDS defining illness). Labs reviewed- No results found for: "CD4", No results found for: "HIVDNAPCR". Patient is on HAART. Will continue HAART. Continue to monitor routine labs. Last CD4 count was   Lab Results   Component Value Date    ABSOLUTECD4 1080 10/12/2023     - Continue home Biktarvy         VTE Risk Mitigation (From admission, onward)         Ordered     apixaban tablet 5 mg  2 times daily         10/24/23 0744          "       Discharge Planning   CASTILLO: 10/26/2023     Code Status: Full Code   Is the patient medically ready for discharge?:     Reason for patient still in hospital (select all that apply): Treatment  Discharge Plan A: Home with family                  Anabelle Rosas DO  Department of Hospital Medicine   [facility] - Intensive Care ([place])

## 2023-10-24 NOTE — ASSESSMENT & PLAN NOTE
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 Lipase mildly elevated. WBC and lactate WNL. On IV Cefepime for 4wk course. Discharged on MSIR and Morphine for pain control.     - EUS with "complex collections along the neck, body, and tail of pancreas. Not suitable for endoscopic transmural stenting/drainage". Follow CT in 4-6 weeks.   - Patient tolerating PO now. Will start creon, low fat diet.  - Continue Cefepime - per ID recs on previous admission, plan to treat until 11/2  - paincontrol - Dilaudid 1mg Q4H PRN; scheduled Tylenol and toradol, gabapentin  - bowel regimen initiated - pericolace BID, miralax PRN.         "

## 2023-10-24 NOTE — ASSESSMENT & PLAN NOTE
Admit with hemoglobin 10.2; Baseline ~ 10    Lab Results   Component Value Date    HGB 9.5 (L) 10/24/2023     - Daily CBC   - Transfuse hgb <7

## 2023-10-24 NOTE — ASSESSMENT & PLAN NOTE
Hx of EtOH use disorder. On most recent admission, patient's ethanol negative in setting of pancreatitis flare.   - monitor for signs of withdrawal, though patient denies EtOH use in between recent admissions

## 2023-10-25 LAB
ALBUMIN SERPL BCP-MCNC: 2.6 G/DL (ref 3.5–5.2)
ALP SERPL-CCNC: 73 U/L (ref 55–135)
ALT SERPL W/O P-5'-P-CCNC: 5 U/L (ref 10–44)
ANION GAP SERPL CALC-SCNC: 10 MMOL/L (ref 8–16)
AST SERPL-CCNC: 10 U/L (ref 10–40)
BASOPHILS # BLD AUTO: 0.03 K/UL (ref 0–0.2)
BASOPHILS NFR BLD: 0.6 % (ref 0–1.9)
BILIRUB SERPL-MCNC: 0.2 MG/DL (ref 0.1–1)
BUN SERPL-MCNC: 6 MG/DL (ref 6–20)
CALCIUM SERPL-MCNC: 9.1 MG/DL (ref 8.7–10.5)
CHLORIDE SERPL-SCNC: 107 MMOL/L (ref 95–110)
CO2 SERPL-SCNC: 23 MMOL/L (ref 23–29)
CREAT SERPL-MCNC: 0.7 MG/DL (ref 0.5–1.4)
DIFFERENTIAL METHOD: ABNORMAL
EOSINOPHIL # BLD AUTO: 0.6 K/UL (ref 0–0.5)
EOSINOPHIL NFR BLD: 11.8 % (ref 0–8)
ERYTHROCYTE [DISTWIDTH] IN BLOOD BY AUTOMATED COUNT: 18.4 % (ref 11.5–14.5)
EST. GFR  (NO RACE VARIABLE): >60 ML/MIN/1.73 M^2
GLUCOSE SERPL-MCNC: 89 MG/DL (ref 70–110)
HCT VFR BLD AUTO: 30.8 % (ref 40–54)
HGB BLD-MCNC: 9.4 G/DL (ref 14–18)
IMM GRANULOCYTES # BLD AUTO: 0.01 K/UL (ref 0–0.04)
IMM GRANULOCYTES NFR BLD AUTO: 0.2 % (ref 0–0.5)
LACTATE SERPL-SCNC: 1.1 MMOL/L (ref 0.5–2.2)
LYMPHOCYTES # BLD AUTO: 1.8 K/UL (ref 1–4.8)
LYMPHOCYTES NFR BLD: 37.8 % (ref 18–48)
MAGNESIUM SERPL-MCNC: 2 MG/DL (ref 1.6–2.6)
MCH RBC QN AUTO: 27.3 PG (ref 27–31)
MCHC RBC AUTO-ENTMCNC: 30.5 G/DL (ref 32–36)
MCV RBC AUTO: 90 FL (ref 82–98)
MONOCYTES # BLD AUTO: 0.5 K/UL (ref 0.3–1)
MONOCYTES NFR BLD: 9.5 % (ref 4–15)
NEUTROPHILS # BLD AUTO: 1.9 K/UL (ref 1.8–7.7)
NEUTROPHILS NFR BLD: 40.1 % (ref 38–73)
NRBC BLD-RTO: 0 /100 WBC
PHOSPHATE SERPL-MCNC: 5 MG/DL (ref 2.7–4.5)
PLATELET # BLD AUTO: 297 K/UL (ref 150–450)
PMV BLD AUTO: 10.4 FL (ref 9.2–12.9)
POTASSIUM SERPL-SCNC: 3.8 MMOL/L (ref 3.5–5.1)
PROT SERPL-MCNC: 6.2 G/DL (ref 6–8.4)
RBC # BLD AUTO: 3.44 M/UL (ref 4.6–6.2)
SODIUM SERPL-SCNC: 140 MMOL/L (ref 136–145)
WBC # BLD AUTO: 4.74 K/UL (ref 3.9–12.7)

## 2023-10-25 PROCEDURE — 83605 ASSAY OF LACTIC ACID: CPT

## 2023-10-25 PROCEDURE — 12000002 HC ACUTE/MED SURGE SEMI-PRIVATE ROOM

## 2023-10-25 PROCEDURE — 83735 ASSAY OF MAGNESIUM: CPT

## 2023-10-25 PROCEDURE — 63600175 PHARM REV CODE 636 W HCPCS

## 2023-10-25 PROCEDURE — 99233 PR SUBSEQUENT HOSPITAL CARE,LEVL III: ICD-10-PCS | Mod: ,,, | Performed by: INTERNAL MEDICINE

## 2023-10-25 PROCEDURE — 25000003 PHARM REV CODE 250: Performed by: INTERNAL MEDICINE

## 2023-10-25 PROCEDURE — 25000003 PHARM REV CODE 250

## 2023-10-25 PROCEDURE — 87040 BLOOD CULTURE FOR BACTERIA: CPT

## 2023-10-25 PROCEDURE — 36415 COLL VENOUS BLD VENIPUNCTURE: CPT

## 2023-10-25 PROCEDURE — 25000003 PHARM REV CODE 250: Performed by: STUDENT IN AN ORGANIZED HEALTH CARE EDUCATION/TRAINING PROGRAM

## 2023-10-25 PROCEDURE — 84100 ASSAY OF PHOSPHORUS: CPT

## 2023-10-25 PROCEDURE — 85025 COMPLETE CBC W/AUTO DIFF WBC: CPT

## 2023-10-25 PROCEDURE — 99233 SBSQ HOSP IP/OBS HIGH 50: CPT | Mod: ,,, | Performed by: INTERNAL MEDICINE

## 2023-10-25 PROCEDURE — 80053 COMPREHEN METABOLIC PANEL: CPT

## 2023-10-25 PROCEDURE — 94761 N-INVAS EAR/PLS OXIMETRY MLT: CPT

## 2023-10-25 PROCEDURE — 63600175 PHARM REV CODE 636 W HCPCS: Performed by: STUDENT IN AN ORGANIZED HEALTH CARE EDUCATION/TRAINING PROGRAM

## 2023-10-25 RX ORDER — AMOXICILLIN 250 MG
1 CAPSULE ORAL 2 TIMES DAILY
Status: DISCONTINUED | OUTPATIENT
Start: 2023-10-25 | End: 2023-10-27

## 2023-10-25 RX ORDER — LACTULOSE 10 G/15ML
20 SOLUTION ORAL 2 TIMES DAILY
Status: COMPLETED | OUTPATIENT
Start: 2023-10-25 | End: 2023-10-25

## 2023-10-25 RX ADMIN — BICTEGRAVIR SODIUM, EMTRICITABINE, AND TENOFOVIR ALAFENAMIDE FUMARATE 1 TABLET: 50; 200; 25 TABLET ORAL at 09:10

## 2023-10-25 RX ADMIN — GABAPENTIN 300 MG: 300 CAPSULE ORAL at 02:10

## 2023-10-25 RX ADMIN — GABAPENTIN 300 MG: 300 CAPSULE ORAL at 09:10

## 2023-10-25 RX ADMIN — HYDROMORPHONE HYDROCHLORIDE 2 MG: 2 INJECTION, SOLUTION INTRAMUSCULAR; INTRAVENOUS; SUBCUTANEOUS at 01:10

## 2023-10-25 RX ADMIN — SENNOSIDES AND DOCUSATE SODIUM 1 TABLET: 50; 8.6 TABLET ORAL at 09:10

## 2023-10-25 RX ADMIN — PANCRELIPASE 1 CAPSULE: 30000; 6000; 19000 CAPSULE, DELAYED RELEASE PELLETS ORAL at 09:10

## 2023-10-25 RX ADMIN — CEFEPIME 2 G: 2 INJECTION, POWDER, FOR SOLUTION INTRAVENOUS at 09:10

## 2023-10-25 RX ADMIN — ACETAMINOPHEN 1000 MG: 500 TABLET ORAL at 09:10

## 2023-10-25 RX ADMIN — HYDROMORPHONE HYDROCHLORIDE 2 MG: 2 INJECTION, SOLUTION INTRAMUSCULAR; INTRAVENOUS; SUBCUTANEOUS at 12:10

## 2023-10-25 RX ADMIN — PROMETHAZINE HYDROCHLORIDE 6.25 MG: 25 INJECTION INTRAMUSCULAR; INTRAVENOUS at 06:10

## 2023-10-25 RX ADMIN — LACTULOSE 20 G: 20 SOLUTION ORAL at 09:10

## 2023-10-25 RX ADMIN — FOLIC ACID 1 MG: 1 TABLET ORAL at 09:10

## 2023-10-25 RX ADMIN — PANTOPRAZOLE SODIUM 40 MG: 40 TABLET, DELAYED RELEASE ORAL at 09:10

## 2023-10-25 RX ADMIN — Medication 6 MG: at 10:10

## 2023-10-25 RX ADMIN — ACETAMINOPHEN 1000 MG: 500 TABLET ORAL at 01:10

## 2023-10-25 RX ADMIN — HYDROMORPHONE HYDROCHLORIDE 2 MG: 2 INJECTION, SOLUTION INTRAMUSCULAR; INTRAVENOUS; SUBCUTANEOUS at 04:10

## 2023-10-25 RX ADMIN — CEFEPIME 2 G: 2 INJECTION, POWDER, FOR SOLUTION INTRAVENOUS at 04:10

## 2023-10-25 RX ADMIN — ONDANSETRON 4 MG: 2 INJECTION INTRAMUSCULAR; INTRAVENOUS at 07:10

## 2023-10-25 RX ADMIN — ONDANSETRON 4 MG: 2 INJECTION INTRAMUSCULAR; INTRAVENOUS at 05:10

## 2023-10-25 RX ADMIN — HYDROMORPHONE HYDROCHLORIDE 2 MG: 2 INJECTION, SOLUTION INTRAMUSCULAR; INTRAVENOUS; SUBCUTANEOUS at 09:10

## 2023-10-25 RX ADMIN — HYDROMORPHONE HYDROCHLORIDE 2 MG: 2 INJECTION, SOLUTION INTRAMUSCULAR; INTRAVENOUS; SUBCUTANEOUS at 05:10

## 2023-10-25 RX ADMIN — PANCRELIPASE 2 CAPSULE: 30000; 6000; 19000 CAPSULE, DELAYED RELEASE PELLETS ORAL at 05:10

## 2023-10-25 RX ADMIN — APIXABAN 5 MG: 5 TABLET, FILM COATED ORAL at 09:10

## 2023-10-25 RX ADMIN — PROMETHAZINE HYDROCHLORIDE 6.25 MG: 25 INJECTION INTRAMUSCULAR; INTRAVENOUS at 01:10

## 2023-10-25 RX ADMIN — HYDROMORPHONE HYDROCHLORIDE 2 MG: 2 INJECTION, SOLUTION INTRAMUSCULAR; INTRAVENOUS; SUBCUTANEOUS at 08:10

## 2023-10-25 RX ADMIN — ACETAMINOPHEN 1000 MG: 500 TABLET ORAL at 05:10

## 2023-10-25 RX ADMIN — CEFEPIME 2 G: 2 INJECTION, POWDER, FOR SOLUTION INTRAVENOUS at 12:10

## 2023-10-25 RX ADMIN — PANCRELIPASE 2 CAPSULE: 30000; 6000; 19000 CAPSULE, DELAYED RELEASE PELLETS ORAL at 01:10

## 2023-10-25 RX ADMIN — LACTULOSE 20 G: 20 SOLUTION ORAL at 02:10

## 2023-10-25 NOTE — ASSESSMENT & PLAN NOTE
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 Lipase mildly elevated. WBC and lactate WNL. On IV Cefepime for 4wk course. Discharged on MSIR and Morphine for pain control. EUS (10/23/23) "complex collections along the neck, body, and tail of pancreas. Not suitable for endoscopic transmural stenting/drainage." Follow CT in 4-6 weeks.     - Patient tolerating PO now: continue creon, low fat diet  - Continue Cefepime - per ID recs on previous admission, plan to treat until 11/2 with outpt follow up scheduled for 11/9  - paincontrol - Dilaudid 2mg Q4H PRN; scheduled Tylenol and toradol, gabapentin  - bowel regimen initiated - pericolace BID, miralax PRN; will give 2 doses lactulose today         "

## 2023-10-25 NOTE — PROGRESS NOTES
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 He has been in and out of the hospital for the last two weeks. His most recent discharge was on10/19 after admission for recurrent symptoms of his acute necrotizing pancreatitis. He was discharged home on MSIR and Morphine which was working well until yesterday morning (10/21) when he started having increased abdominal pain w/nausea. He attempted to eat something but vomited. He denies any recent alcohol or tobacco use. He reports eating normally since discharge. Last meal was a grilled cheese yesterday for dinner. Last BM was loose and "oily" but normal color w/out blood present. He was evaluated by [facility] on his 10/5 admission who recommended against any intervention on the pancreatic collection due to immaturity. He has been on IV Cefepime since 10/5 with possible end date 11/2.     In the ED, he was afebrile and tachycardic to the 130s. Labs were notable for K 3.3, Bicarb 21, BUN 5, Alb 3.0, Lipase 96. Lactate WNL. Repeat CT A/P showed small R pleural effusion but no acute intra-abdominopelvic abnormality. Peripancreatic fluid collection at the pancreatic tail stable from previous imaging. He was given Dilaudid 2mg x1, Dilaudid 1mg x2 for pain and his evening dose of Cefepime. Hospital Medicine was called for admission of       Overview/Hospital Course:  Admitted to hospital medicine. Cefepime and MS contin continued, with " "addition of scheduled tylenol + toradol and then breakthrough dilaudid. [facility] consulted again now that imaging findings were stable >4w since initial diagnosis and persistent symptoms. Underwent EUS with findings of stable fluid collection which was not drained ("Complex collections along the neck, body, and tail of pancreas (body/tail region likely connected). Not suitable for endoscopic transmural stenting/drainage.") He will need follow-up CT scan in 4-6 weeks. Patient will need adequate pain control prior to discharge. Cefepime to complete 11/12/23.       Interval History: Continued abdominal pain and nausea with food. Has not had a bowel movement since admission. Abdominal tenderness still present, though slightly improved from previous exams. Tolerating gabapentin well.     Review of Systems   Constitutional:  Negative for chills, fatigue and fever.   HENT:  Negative for congestion and sore throat.    Respiratory:  Negative for cough and shortness of breath.    Cardiovascular:  Negative for chest pain.   Gastrointestinal:  Positive for abdominal pain and nausea. Negative for constipation, diarrhea and vomiting.   Genitourinary:  Negative for dysuria and flank pain.   Musculoskeletal:  Negative for joint swelling.   Neurological:  Negative for dizziness and headaches.   Psychiatric/Behavioral:  Negative for confusion. The patient is not nervous/anxious.      Objective:     Vital Signs (Most Recent):  Temp: 98 °F (36.7 °C) (10/25/23 0820)  Pulse: 69 (10/25/23 0820)  Resp: 18 (10/25/23 0856)  BP: 118/74 (10/25/23 0820)  SpO2: 96 % (10/25/23 0820) Vital Signs (24h Range):  Temp:  [97.7 °F (36.5 °C)-98.9 °F (37.2 °C)] 98 °F (36.7 °C)  Pulse:  [69-78] 69  Resp:  [17-18] 18  SpO2:  [93 %-96 %] 96 %  BP: (111-135)/(69-76) 118/74     Weight: 68 kg (150 lb)  Body mass index is 21.52 kg/m².    Intake/Output Summary (Last 24 hours) at 10/25/2023 1116  Last data filed at 10/25/2023 0973  Gross per 24 hour   Intake 1320.62 ml " "  Output --   Net 1320.62 ml           Physical Exam  Constitutional:  Lying comfortably in bed, food at bedside, watching television.      Appearance: Normal appearance.   HENT:      Head: Normocephalic and atraumatic.      Nose: Nose normal.      Mouth/Throat:      Mouth: Mucous membranes are moist.   Eyes:      Extraocular Movements: Extraocular movements intact.      Pupils: Pupils are equal, round, and reactive to light.   Cardiovascular:      Rate and Rhythm: Normal rate and regular rhythm.   Pulmonary:      Effort: Pulmonary effort is normal.      Breath sounds: Normal breath sounds.   Abdominal:      General: Abdomen is flat. Bowel sounds are normal.      Tenderness: There is abdominal tenderness (lower quadrants).   Musculoskeletal:         General: Normal range of motion.      Cervical back: Normal range of motion.   Skin:     General: Skin is warm and dry.   Neurological:      General: No focal deficit present.      Mental Status: He is alert and oriented to person, place, and time.   Psychiatric:         Mood and Affect: Mood normal.         Behavior: Behavior normal.             Significant Labs: All pertinent labs within the past 24 hours have been reviewed.    Significant Imaging: I have reviewed all pertinent imaging results/findings within the past 24 hours.      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 Lipase mildly elevated. WBC and lactate WNL. On IV Cefepime for 4wk course. Discharged on MSIR and Morphine for pain control. EUS (10/23/23) "complex collections along the neck, body, and tail of pancreas. Not suitable for endoscopic transmural stenting/drainage." Follow CT in 4-6 weeks.     - Patient tolerating PO now: continue creon, low fat diet  - " "Continue Cefepime - per ID recs on previous admission, plan to treat until 11/2 with outpt follow up scheduled for 11/9  - paincontrol - Dilaudid 2mg Q4H PRN; scheduled Tylenol and toradol, gabapentin  - bowel regimen initiated - pericolace BID, miralax PRN; will give 2 doses lactulose today           Pancreatic pseudocyst/cyst  - F/u CT scan in 4 weeks per GI  - Continue pain control, creon, low fat diet.    Hypokalemia  K 3.3 on admission; likely 2/2 to decreased PO intake    - Gave IV K 10meq Q1H for 6 doses   - Replete K PRN       Continuous severe abdominal pain  Likely 2/2 to pancreatitis; Discharged home on Morphine taper which was working well until yesterday morning    - See Chronic Pancreatitis       Alcohol use disorder  Hx of EtOH use disorder. On most recent admission, patient's ethanol negative in setting of pancreatitis flare.   - monitor for signs of withdrawal, though patient denies EtOH use in between recent admissions      Splenic vein thrombosis  Splenic vein thrombus originally seen on 9/20 imaging, and persistent on most recent CT abdominal imaging. Started on DOAC at last admission, loading dose completed.     Continue eliquis 5 mg BID      Mild intermittent asthma without complication  History of asthma requiring PRN albuterol. Has not recently been using any inhaler treatment.     Normocytic anemia  Admit with hemoglobin 10.2; Baseline ~ 10    Lab Results   Component Value Date    HGB 9.4 (L) 10/25/2023     - Daily CBC   - Transfuse hgb <7       Polycythemia vera  Hgb 10.5 on admission  - continue to monitor daily CBC      HIV infection  This patient in known to have HIV+ status (Have detected HIV PCR but never CD4 <200 or AIDS defining illness). Labs reviewed- No results found for: "CD4", No results found for: "HIVDNAPCR". Patient is on HAART. Will continue HAART. Continue to monitor routine labs. Last CD4 count was   Lab Results   Component Value Date    ABSOLUTECD4 1080 10/12/2023     - " Continue home Biktarvy         VTE Risk Mitigation (From admission, onward)         Ordered     apixaban tablet 5 mg  2 times daily         10/24/23 0744                Discharge Planning   CASTILLO: 10/27/2023     Code Status: Full Code   Is the patient medically ready for discharge?:     Reason for patient still in hospital (select all that apply): Treatment  Discharge Plan A: Home with family                  Noah Trinh MD  Department of Hospital Medicine   [facility] - Intensive Care ([address]

## 2023-10-25 NOTE — ASSESSMENT & PLAN NOTE
Admit with hemoglobin 10.2; Baseline ~ 10    Lab Results   Component Value Date    HGB 9.4 (L) 10/25/2023     - Daily CBC   - Transfuse hgb <7

## 2023-10-25 NOTE — NURSING
Obtained vitals at 1600, patient noted to be covered in sweat, afebrile. Sheets were drenched and changed. Patient states he has been doing this often. Team notified. Blood cx x 2 and lactic ordered.

## 2023-10-25 NOTE — PLAN OF CARE
Problem: Adult Inpatient Plan of Care  Goal: Plan of Care Review  Outcome: Ongoing, Progressing  Goal: Patient-Specific Goal (Individualized)  Outcome: Ongoing, Progressing  Goal: Absence of Hospital-Acquired Illness or Injury  Outcome: Ongoing, Progressing  Goal: Optimal Comfort and Wellbeing  Outcome: Ongoing, Progressing  Goal: Readiness for Transition of Care  Outcome: Ongoing, Progressing     Problem: Infection  Goal: Absence of Infection Signs and Symptoms  Outcome: Ongoing, Progressing     Problem: Pain Acute  Goal: Acceptable Pain Control and Functional Ability  Outcome: Ongoing, Progressing     Problem: Nausea and Vomiting  Goal: Fluid and Electrolyte Balance  Outcome: Ongoing, Progressing

## 2023-10-25 NOTE — SUBJECTIVE & OBJECTIVE
Interval History: Continued abdominal pain and nausea with food. Has not had a bowel movement since admission. Abdominal tenderness still present, though slightly improved from previous exams. Tolerating gabapentin well.     Review of Systems   Constitutional:  Negative for chills, fatigue and fever.   HENT:  Negative for congestion and sore throat.    Respiratory:  Negative for cough and shortness of breath.    Cardiovascular:  Negative for chest pain.   Gastrointestinal:  Positive for abdominal pain and nausea. Negative for constipation, diarrhea and vomiting.   Genitourinary:  Negative for dysuria and flank pain.   Musculoskeletal:  Negative for joint swelling.   Neurological:  Negative for dizziness and headaches.   Psychiatric/Behavioral:  Negative for confusion. The patient is not nervous/anxious.      Objective:     Vital Signs (Most Recent):  Temp: 98 °F (36.7 °C) (10/25/23 0820)  Pulse: 69 (10/25/23 0820)  Resp: 18 (10/25/23 0856)  BP: 118/74 (10/25/23 0820)  SpO2: 96 % (10/25/23 0820) Vital Signs (24h Range):  Temp:  [97.7 °F (36.5 °C)-98.9 °F (37.2 °C)] 98 °F (36.7 °C)  Pulse:  [69-78] 69  Resp:  [17-18] 18  SpO2:  [93 %-96 %] 96 %  BP: (111-135)/(69-76) 118/74     Weight: 68 kg (150 lb)  Body mass index is 21.52 kg/m².    Intake/Output Summary (Last 24 hours) at 10/25/2023 1115  Last data filed at 10/25/2023 0928  Gross per 24 hour   Intake 1320.62 ml   Output --   Net 1320.62 ml           Physical Exam  Constitutional:       Appearance: Normal appearance.   HENT:      Head: Normocephalic and atraumatic.      Nose: Nose normal.      Mouth/Throat:      Mouth: Mucous membranes are moist.   Eyes:      Extraocular Movements: Extraocular movements intact.      Pupils: Pupils are equal, round, and reactive to light.   Cardiovascular:      Rate and Rhythm: Normal rate and regular rhythm.   Pulmonary:      Effort: Pulmonary effort is normal.      Breath sounds: Normal breath sounds.   Abdominal:      General:  Abdomen is flat. Bowel sounds are normal.      Tenderness: There is abdominal tenderness (lower quadrants).   Musculoskeletal:         General: Normal range of motion.      Cervical back: Normal range of motion.   Skin:     General: Skin is warm and dry.   Neurological:      General: No focal deficit present.      Mental Status: He is alert and oriented to person, place, and time.   Psychiatric:         Mood and Affect: Mood normal.         Behavior: Behavior normal.             Significant Labs: All pertinent labs within the past 24 hours have been reviewed.    Significant Imaging: I have reviewed all pertinent imaging results/findings within the past 24 hours.

## 2023-10-25 NOTE — ASSESSMENT & PLAN NOTE
Splenic vein thrombus originally seen on 9/20 imaging, and persistent on most recent CT abdominal imaging. Started on DOAC at last admission, loading dose completed.     Continue eliquis 5 mg BID

## 2023-10-26 LAB
ALBUMIN SERPL BCP-MCNC: 2.7 G/DL (ref 3.5–5.2)
ALP SERPL-CCNC: 74 U/L (ref 55–135)
ALT SERPL W/O P-5'-P-CCNC: 5 U/L (ref 10–44)
ANION GAP SERPL CALC-SCNC: 11 MMOL/L (ref 8–16)
AST SERPL-CCNC: 10 U/L (ref 10–40)
BASOPHILS # BLD AUTO: 0.04 K/UL (ref 0–0.2)
BASOPHILS NFR BLD: 0.7 % (ref 0–1.9)
BILIRUB SERPL-MCNC: 0.2 MG/DL (ref 0.1–1)
BUN SERPL-MCNC: 5 MG/DL (ref 6–20)
CALCIUM SERPL-MCNC: 9 MG/DL (ref 8.7–10.5)
CHLORIDE SERPL-SCNC: 107 MMOL/L (ref 95–110)
CO2 SERPL-SCNC: 21 MMOL/L (ref 23–29)
CREAT SERPL-MCNC: 0.6 MG/DL (ref 0.5–1.4)
DIFFERENTIAL METHOD: ABNORMAL
EOSINOPHIL # BLD AUTO: 0.6 K/UL (ref 0–0.5)
EOSINOPHIL NFR BLD: 10.4 % (ref 0–8)
ERYTHROCYTE [DISTWIDTH] IN BLOOD BY AUTOMATED COUNT: 18.1 % (ref 11.5–14.5)
EST. GFR  (NO RACE VARIABLE): >60 ML/MIN/1.73 M^2
GLUCOSE SERPL-MCNC: 92 MG/DL (ref 70–110)
HCT VFR BLD AUTO: 30.3 % (ref 40–54)
HGB BLD-MCNC: 9.4 G/DL (ref 14–18)
IMM GRANULOCYTES # BLD AUTO: 0.01 K/UL (ref 0–0.04)
IMM GRANULOCYTES NFR BLD AUTO: 0.2 % (ref 0–0.5)
LYMPHOCYTES # BLD AUTO: 2.1 K/UL (ref 1–4.8)
LYMPHOCYTES NFR BLD: 34.3 % (ref 18–48)
MAGNESIUM SERPL-MCNC: 1.9 MG/DL (ref 1.6–2.6)
MCH RBC QN AUTO: 27.7 PG (ref 27–31)
MCHC RBC AUTO-ENTMCNC: 31 G/DL (ref 32–36)
MCV RBC AUTO: 89 FL (ref 82–98)
MONOCYTES # BLD AUTO: 0.6 K/UL (ref 0.3–1)
MONOCYTES NFR BLD: 9.4 % (ref 4–15)
NEUTROPHILS # BLD AUTO: 2.7 K/UL (ref 1.8–7.7)
NEUTROPHILS NFR BLD: 45 % (ref 38–73)
NRBC BLD-RTO: 0 /100 WBC
PHOSPHATE SERPL-MCNC: 4.8 MG/DL (ref 2.7–4.5)
PLATELET # BLD AUTO: 295 K/UL (ref 150–450)
PMV BLD AUTO: 10.1 FL (ref 9.2–12.9)
POTASSIUM SERPL-SCNC: 3.9 MMOL/L (ref 3.5–5.1)
PROT SERPL-MCNC: 6.5 G/DL (ref 6–8.4)
RBC # BLD AUTO: 3.39 M/UL (ref 4.6–6.2)
SODIUM SERPL-SCNC: 139 MMOL/L (ref 136–145)
WBC # BLD AUTO: 5.98 K/UL (ref 3.9–12.7)

## 2023-10-26 PROCEDURE — 25000003 PHARM REV CODE 250

## 2023-10-26 PROCEDURE — 63600175 PHARM REV CODE 636 W HCPCS

## 2023-10-26 PROCEDURE — 99233 PR SUBSEQUENT HOSPITAL CARE,LEVL III: ICD-10-PCS | Mod: ,,, | Performed by: HOSPITALIST

## 2023-10-26 PROCEDURE — 83735 ASSAY OF MAGNESIUM: CPT

## 2023-10-26 PROCEDURE — 12000002 HC ACUTE/MED SURGE SEMI-PRIVATE ROOM

## 2023-10-26 PROCEDURE — 25000003 PHARM REV CODE 250: Performed by: INTERNAL MEDICINE

## 2023-10-26 PROCEDURE — 99233 SBSQ HOSP IP/OBS HIGH 50: CPT | Mod: ,,, | Performed by: HOSPITALIST

## 2023-10-26 PROCEDURE — 84100 ASSAY OF PHOSPHORUS: CPT

## 2023-10-26 PROCEDURE — 85025 COMPLETE CBC W/AUTO DIFF WBC: CPT

## 2023-10-26 PROCEDURE — 63600175 PHARM REV CODE 636 W HCPCS: Performed by: STUDENT IN AN ORGANIZED HEALTH CARE EDUCATION/TRAINING PROGRAM

## 2023-10-26 PROCEDURE — 80053 COMPREHEN METABOLIC PANEL: CPT

## 2023-10-26 PROCEDURE — 36415 COLL VENOUS BLD VENIPUNCTURE: CPT

## 2023-10-26 PROCEDURE — 25000003 PHARM REV CODE 250: Performed by: STUDENT IN AN ORGANIZED HEALTH CARE EDUCATION/TRAINING PROGRAM

## 2023-10-26 RX ORDER — LACTULOSE 10 G/15ML
20 SOLUTION ORAL 3 TIMES DAILY
Status: DISCONTINUED | OUTPATIENT
Start: 2023-10-26 | End: 2023-10-28

## 2023-10-26 RX ORDER — GABAPENTIN 300 MG/1
600 CAPSULE ORAL 3 TIMES DAILY
Status: DISCONTINUED | OUTPATIENT
Start: 2023-10-26 | End: 2023-11-07

## 2023-10-26 RX ORDER — POLYETHYLENE GLYCOL 3350 17 G/17G
17 POWDER, FOR SOLUTION ORAL DAILY
Status: DISCONTINUED | OUTPATIENT
Start: 2023-10-26 | End: 2023-10-27

## 2023-10-26 RX ADMIN — PANCRELIPASE 2 CAPSULE: 30000; 6000; 19000 CAPSULE, DELAYED RELEASE PELLETS ORAL at 04:10

## 2023-10-26 RX ADMIN — CEFEPIME 2 G: 2 INJECTION, POWDER, FOR SOLUTION INTRAVENOUS at 08:10

## 2023-10-26 RX ADMIN — ACETAMINOPHEN 1000 MG: 500 TABLET ORAL at 01:10

## 2023-10-26 RX ADMIN — MORPHINE SULFATE 15 MG: 15 TABLET, EXTENDED RELEASE ORAL at 08:10

## 2023-10-26 RX ADMIN — PANCRELIPASE 2 CAPSULE: 30000; 6000; 19000 CAPSULE, DELAYED RELEASE PELLETS ORAL at 09:10

## 2023-10-26 RX ADMIN — HYDROMORPHONE HYDROCHLORIDE 2 MG: 2 INJECTION, SOLUTION INTRAMUSCULAR; INTRAVENOUS; SUBCUTANEOUS at 09:10

## 2023-10-26 RX ADMIN — PROMETHAZINE HYDROCHLORIDE 6.25 MG: 25 INJECTION INTRAMUSCULAR; INTRAVENOUS at 09:10

## 2023-10-26 RX ADMIN — HYDROMORPHONE HYDROCHLORIDE 2 MG: 2 INJECTION, SOLUTION INTRAMUSCULAR; INTRAVENOUS; SUBCUTANEOUS at 01:10

## 2023-10-26 RX ADMIN — ONDANSETRON 4 MG: 2 INJECTION INTRAMUSCULAR; INTRAVENOUS at 01:10

## 2023-10-26 RX ADMIN — MUPIROCIN: 20 OINTMENT TOPICAL at 09:10

## 2023-10-26 RX ADMIN — PANTOPRAZOLE SODIUM 40 MG: 40 TABLET, DELAYED RELEASE ORAL at 08:10

## 2023-10-26 RX ADMIN — APIXABAN 5 MG: 5 TABLET, FILM COATED ORAL at 08:10

## 2023-10-26 RX ADMIN — LACTULOSE 20 G: 20 SOLUTION ORAL at 04:10

## 2023-10-26 RX ADMIN — ACETAMINOPHEN 1000 MG: 500 TABLET ORAL at 05:10

## 2023-10-26 RX ADMIN — BICTEGRAVIR SODIUM, EMTRICITABINE, AND TENOFOVIR ALAFENAMIDE FUMARATE 1 TABLET: 50; 200; 25 TABLET ORAL at 09:10

## 2023-10-26 RX ADMIN — APIXABAN 5 MG: 5 TABLET, FILM COATED ORAL at 09:10

## 2023-10-26 RX ADMIN — PANCRELIPASE 2 CAPSULE: 30000; 6000; 19000 CAPSULE, DELAYED RELEASE PELLETS ORAL at 12:10

## 2023-10-26 RX ADMIN — PANTOPRAZOLE SODIUM 40 MG: 40 TABLET, DELAYED RELEASE ORAL at 09:10

## 2023-10-26 RX ADMIN — HYDROMORPHONE HYDROCHLORIDE 2 MG: 2 INJECTION, SOLUTION INTRAMUSCULAR; INTRAVENOUS; SUBCUTANEOUS at 05:10

## 2023-10-26 RX ADMIN — MORPHINE SULFATE 15 MG: 15 TABLET, EXTENDED RELEASE ORAL at 09:10

## 2023-10-26 RX ADMIN — GABAPENTIN 300 MG: 300 CAPSULE ORAL at 09:10

## 2023-10-26 RX ADMIN — CEFEPIME 2 G: 2 INJECTION, POWDER, FOR SOLUTION INTRAVENOUS at 12:10

## 2023-10-26 RX ADMIN — ACETAMINOPHEN 1000 MG: 500 TABLET ORAL at 09:10

## 2023-10-26 RX ADMIN — GABAPENTIN 600 MG: 300 CAPSULE ORAL at 04:10

## 2023-10-26 RX ADMIN — POLYETHYLENE GLYCOL 3350 17 G: 17 POWDER, FOR SOLUTION ORAL at 05:10

## 2023-10-26 RX ADMIN — ONDANSETRON 4 MG: 2 INJECTION INTRAMUSCULAR; INTRAVENOUS at 05:10

## 2023-10-26 RX ADMIN — SENNOSIDES AND DOCUSATE SODIUM 1 TABLET: 50; 8.6 TABLET ORAL at 08:10

## 2023-10-26 RX ADMIN — GABAPENTIN 600 MG: 300 CAPSULE ORAL at 08:10

## 2023-10-26 RX ADMIN — Medication 6 MG: at 10:10

## 2023-10-26 RX ADMIN — FOLIC ACID 1 MG: 1 TABLET ORAL at 09:10

## 2023-10-26 RX ADMIN — SENNOSIDES AND DOCUSATE SODIUM 1 TABLET: 50; 8.6 TABLET ORAL at 09:10

## 2023-10-26 RX ADMIN — CEFEPIME 2 G: 2 INJECTION, POWDER, FOR SOLUTION INTRAVENOUS at 04:10

## 2023-10-26 NOTE — ASSESSMENT & PLAN NOTE
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 Lipase mildly elevated. WBC and lactate WNL. On IV Cefepime for 4wk course. Discharged on MSIR and Morphine for pain control. EUS (10/23/23) "complex collections along the neck, body, and tail of pancreas. Not suitable for endoscopic transmural stenting/drainage." Follow CT in 4-6 weeks.     - Patient tolerating PO now: continue creon, low fat diet  - Continue Cefepime - per ID recs on previous admission, plan to treat until 11/2 with outpt follow up scheduled for 11/9  - paincontrol - Dilaudid 2mg Q4H PRN; scheduled Tylenol and toradol; increase gabapentin from 300 mg TID to 600 mg TID  - bowel regimen initiated - pericolace BID, miralax PRN; added lactulose TID today       "

## 2023-10-26 NOTE — SUBJECTIVE & OBJECTIVE
Interval History: Episode of heavy sweating yesterday afternoon. Vitals at the time within normal limts; lactate negative, and blood cultures NGTD at this time.      Continued abdominal pain and nausea with food. Has not had a bowel movement since admission. Abdominal tenderness still present, though slightly improved from previous exams. Tolerating gabapentin well.     Review of Systems   Constitutional:  Negative for chills, fatigue and fever.   HENT:  Negative for congestion and sore throat.    Respiratory:  Negative for cough and shortness of breath.    Cardiovascular:  Negative for chest pain.   Gastrointestinal:  Positive for abdominal pain and nausea. Negative for constipation, diarrhea and vomiting.   Genitourinary:  Negative for dysuria and flank pain.   Musculoskeletal:  Negative for joint swelling.   Neurological:  Negative for dizziness and headaches.   Psychiatric/Behavioral:  Negative for confusion. The patient is not nervous/anxious.      Objective:     Vital Signs (Most Recent):  Temp: 98.4 °F (36.9 °C) (10/26/23 0426)  Pulse: 67 (10/26/23 0426)  Resp: 16 (10/26/23 0550)  BP: 114/68 (10/26/23 0426)  SpO2: 97 % (10/26/23 0426) Vital Signs (24h Range):  Temp:  [98 °F (36.7 °C)-99.2 °F (37.3 °C)] 98.4 °F (36.9 °C)  Pulse:  [67-94] 67  Resp:  [16-18] 16  SpO2:  [95 %-97 %] 97 %  BP: (114-127)/(68-81) 114/68     Weight: 68 kg (150 lb)  Body mass index is 21.52 kg/m².    Intake/Output Summary (Last 24 hours) at 10/26/2023 0630  Last data filed at 10/26/2023 0602  Gross per 24 hour   Intake 1690 ml   Output --   Net 1690 ml           Physical Exam  Constitutional:       Appearance: Normal appearance.   HENT:      Head: Normocephalic and atraumatic.      Nose: Nose normal.      Mouth/Throat:      Mouth: Mucous membranes are moist.   Eyes:      Extraocular Movements: Extraocular movements intact.      Pupils: Pupils are equal, round, and reactive to light.   Cardiovascular:      Rate and Rhythm: Normal rate  and regular rhythm.   Pulmonary:      Effort: Pulmonary effort is normal.      Breath sounds: Normal breath sounds.   Abdominal:      General: Abdomen is flat. Bowel sounds are normal.      Tenderness: There is abdominal tenderness (lower quadrants).   Musculoskeletal:         General: Normal range of motion.      Cervical back: Normal range of motion.   Skin:     General: Skin is warm and dry.   Neurological:      General: No focal deficit present.      Mental Status: He is alert and oriented to person, place, and time.   Psychiatric:         Mood and Affect: Mood normal.         Behavior: Behavior normal.             Significant Labs: All pertinent labs within the past 24 hours have been reviewed.    Significant Imaging: I have reviewed all pertinent imaging results/findings within the past 24 hours.

## 2023-10-26 NOTE — PLAN OF CARE
Problem: Adult Inpatient Plan of Care  Goal: Plan of Care Review  Outcome: Ongoing, Progressing  Goal: Patient-Specific Goal (Individualized)  Outcome: Ongoing, Progressing  Goal: Optimal Comfort and Wellbeing  Outcome: Ongoing, Progressing  Goal: Readiness for Transition of Care  Outcome: Ongoing, Progressing     Problem: Infection  Goal: Absence of Infection Signs and Symptoms  Outcome: Ongoing, Progressing     Problem: Pain Acute  Goal: Acceptable Pain Control and Functional Ability  Outcome: Ongoing, Progressing     Problem: Nausea and Vomiting  Goal: Fluid and Electrolyte Balance  Outcome: Ongoing, Progressing

## 2023-10-26 NOTE — PROGRESS NOTES
"[facility] - Intensive Care ([place] [address])  [facility] 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 He has been in and out of the hospital for the last two weeks. His most recent discharge was on10/19 after admission for recurrent symptoms of his acute necrotizing pancreatitis. He was discharged home on MSIR and Morphine which was working well until yesterday morning (10/21) when he started having increased abdominal pain w/nausea. He attempted to eat something but vomited. He denies any recent alcohol or tobacco use. He reports eating normally since discharge. Last meal was a grilled cheese yesterday for dinner. Last BM was loose and "oily" but normal color w/out blood present. He was evaluated by [facility] on his 10/5 admission who recommended against any intervention on the pancreatic collection due to immaturity. He has been on IV Cefepime since 10/5 with possible end date 11/2.     In the ED, he was afebrile and tachycardic to the 130s. Labs were notable for K 3.3, Bicarb 21, BUN 5, Alb 3.0, Lipase 96. Lactate WNL. Repeat CT A/P showed small R pleural effusion but no acute intra-abdominopelvic abnormality. Peripancreatic fluid collection at the pancreatic tail stable from previous imaging. He was given Dilaudid 2mg x1, Dilaudid 1mg x2 for pain and his evening dose of Cefepime. Hospital Medicine was called for admission of       Overview/Hospital Course:  Admitted to hospital medicine. Cefepime and MS contin continued, " "with addition of scheduled tylenol + toradol and then breakthrough dilaudid. [facility] consulted again now that imaging findings were stable >4w since initial diagnosis and persistent symptoms. Underwent EUS with findings of stable fluid collection which was not drained ("Complex collections along the neck, body, and tail of pancreas (body/tail region likely connected). Not suitable for endoscopic transmural stenting/drainage.") He will need follow-up CT scan in 4-6 weeks. Patient will need adequate pain control prior to discharge; gabapentin added during admission. Cefepime to complete 11/2/23.       Interval History: Episode of heavy sweating yesterday afternoon. Vitals at the time within normal limts; lactate negative, and blood cultures NGTD at this time.      Continued abdominal pain and nausea with food. Has not had a bowel movement since admission. Abdominal tenderness still present, though slightly improved from previous exams. Tolerating gabapentin well.     Review of Systems   Constitutional:  Negative for chills, fatigue and fever.   HENT:  Negative for congestion and sore throat.    Respiratory:  Negative for cough and shortness of breath.    Cardiovascular:  Negative for chest pain.   Gastrointestinal:  Positive for abdominal pain and nausea. Negative for constipation, diarrhea and vomiting.   Genitourinary:  Negative for dysuria and flank pain.   Musculoskeletal:  Negative for joint swelling.   Neurological:  Negative for dizziness and headaches.   Psychiatric/Behavioral:  Negative for confusion. The patient is not nervous/anxious.      Objective:     Vital Signs (Most Recent):  Temp: 98.4 °F (36.9 °C) (10/26/23 0426)  Pulse: 67 (10/26/23 0426)  Resp: 16 (10/26/23 0550)  BP: 114/68 (10/26/23 0426)  SpO2: 97 % (10/26/23 0426) Vital Signs (24h Range):  Temp:  [98 °F (36.7 °C)-99.2 °F (37.3 °C)] 98.4 °F (36.9 °C)  Pulse:  [67-94] 67  Resp:  [16-18] 16  SpO2:  [95 %-97 %] 97 %  BP: (114-127)/(68-81) 114/68 " "    Weight: 68 kg (150 lb)  Body mass index is 21.52 kg/m².    Intake/Output Summary (Last 24 hours) at 10/26/2023 0630  Last data filed at 10/26/2023 0602  Gross per 24 hour   Intake 1690 ml   Output --   Net 1690 ml           Physical Exam  Constitutional: Lying comfortably in bed, food at bedside, watching television.      Appearance: Normal appearance.   HENT:      Head: Normocephalic and atraumatic.      Nose: Nose normal.      Mouth/Throat:      Mouth: Mucous membranes are moist.   Eyes:      Extraocular Movements: Extraocular movements intact.      Pupils: Pupils are equal, round, and reactive to light.   Cardiovascular:      Rate and Rhythm: Normal rate and regular rhythm.   Pulmonary:      Effort: Pulmonary effort is normal.      Breath sounds: Normal breath sounds.   Abdominal:      General: Abdomen is flat. Bowel sounds are normal.      Tenderness: There is abdominal tenderness (lower quadrants).   Musculoskeletal:         General: Normal range of motion.      Cervical back: Normal range of motion.   Skin:     General: Skin is warm and dry.   Neurological:      General: No focal deficit present.      Mental Status: He is alert and oriented to person, place, and time.   Psychiatric:         Mood and Affect: Mood normal.         Behavior: Behavior normal.             Significant Labs: All pertinent labs within the past 24 hours have been reviewed.    Significant Imaging: I have reviewed all pertinent imaging results/findings within the past 24 hours.      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 Lipase mildly elevated. WBC and lactate WNL. On IV Cefepime for 4wk course. Discharged on MSIR and Morphine for pain control. EUS (10/23/23) "complex collections along " "the neck, body, and tail of pancreas. Not suitable for endoscopic transmural stenting/drainage." Follow CT in 4-6 weeks.     - Patient tolerating PO now: continue creon, low fat diet  - Continue Cefepime - per ID recs on previous admission, plan to treat until 11/2 with outpt follow up scheduled for 11/9  - paincontrol - Dilaudid 2mg Q4H PRN; scheduled Tylenol and toradol; increase gabapentin from 300 mg TID to 600 mg TID  - bowel regimen initiated - pericolace BID, miralax PRN; added lactulose TID today         Pancreatic pseudocyst/cyst  - F/u CT scan in 4 weeks per GI  - Continue pain control, creon, low fat diet.    Hypokalemia  K 3.3 on admission; likely 2/2 to decreased PO intake    - Gave IV K 10meq Q1H for 6 doses   - Replete K PRN       Continuous severe abdominal pain  Likely 2/2 to pancreatitis; Discharged home on Morphine taper which was working well until yesterday morning    - See Chronic Pancreatitis       Alcohol use disorder  Hx of EtOH use disorder. On most recent admission, patient's ethanol negative in setting of pancreatitis flare.   - monitor for signs of withdrawal, though patient denies EtOH use in between recent admissions      Splenic vein thrombosis  Splenic vein thrombus originally seen on 9/20 imaging, and persistent on most recent CT abdominal imaging. Started on DOAC at last admission, loading dose completed.     Continue eliquis 5 mg BID      Mild intermittent asthma without complication  History of asthma requiring PRN albuterol. Has not recently been using any inhaler treatment.     Normocytic anemia  Admit with hemoglobin 10.2; Baseline ~ 10    Lab Results   Component Value Date    HGB 9.4 (L) 10/26/2023     - Daily CBC   - Transfuse hgb <7       Polycythemia vera  Hgb 10.5 on admission  - continue to monitor daily CBC      HIV infection  This patient in known to have HIV+ status (Have detected HIV PCR but never CD4 <200 or AIDS defining illness). Labs reviewed- No results found " "for: "CD4", No results found for: "HIVDNAPCR". Patient is on HAART. Will continue HAART. Continue to monitor routine labs. Last CD4 count was   Lab Results   Component Value Date    ABSOLUTECD4 1080 10/12/2023     - Continue home Biktarvy         VTE Risk Mitigation (From admission, onward)           Ordered     apixaban tablet 5 mg  2 times daily         10/24/23 0744                    Discharge Planning   CASTILLO: 10/27/2023     Code Status: Full Code   Is the patient medically ready for discharge?:     Reason for patient still in hospital (select all that apply): Treatment  Discharge Plan A: Home with family                  Noah Trinh MD  Department of Hospital Medicine   [facility] - Intensive Care ([place] [id number])    "

## 2023-10-26 NOTE — ASSESSMENT & PLAN NOTE
Admit with hemoglobin 10.2; Baseline ~ 10    Lab Results   Component Value Date    HGB 9.4 (L) 10/26/2023     - Daily CBC   - Transfuse hgb <7

## 2023-10-27 LAB
ALBUMIN SERPL BCP-MCNC: 3 G/DL (ref 3.5–5.2)
ALP SERPL-CCNC: 78 U/L (ref 55–135)
ALT SERPL W/O P-5'-P-CCNC: 7 U/L (ref 10–44)
ANION GAP SERPL CALC-SCNC: 12 MMOL/L (ref 8–16)
AST SERPL-CCNC: 13 U/L (ref 10–40)
BASOPHILS # BLD AUTO: 0.03 K/UL (ref 0–0.2)
BASOPHILS NFR BLD: 0.4 % (ref 0–1.9)
BILIRUB SERPL-MCNC: 0.3 MG/DL (ref 0.1–1)
BUN SERPL-MCNC: 8 MG/DL (ref 6–20)
CALCIUM SERPL-MCNC: 9.1 MG/DL (ref 8.7–10.5)
CHLORIDE SERPL-SCNC: 105 MMOL/L (ref 95–110)
CO2 SERPL-SCNC: 22 MMOL/L (ref 23–29)
CREAT SERPL-MCNC: 0.6 MG/DL (ref 0.5–1.4)
DIFFERENTIAL METHOD: ABNORMAL
EOSINOPHIL # BLD AUTO: 0.6 K/UL (ref 0–0.5)
EOSINOPHIL NFR BLD: 8 % (ref 0–8)
ERYTHROCYTE [DISTWIDTH] IN BLOOD BY AUTOMATED COUNT: 18.6 % (ref 11.5–14.5)
EST. GFR  (NO RACE VARIABLE): >60 ML/MIN/1.73 M^2
GLUCOSE SERPL-MCNC: 105 MG/DL (ref 70–110)
HCT VFR BLD AUTO: 33.6 % (ref 40–54)
HGB BLD-MCNC: 10.5 G/DL (ref 14–18)
IMM GRANULOCYTES # BLD AUTO: 0.03 K/UL (ref 0–0.04)
IMM GRANULOCYTES NFR BLD AUTO: 0.4 % (ref 0–0.5)
LYMPHOCYTES # BLD AUTO: 1.4 K/UL (ref 1–4.8)
LYMPHOCYTES NFR BLD: 19.3 % (ref 18–48)
MAGNESIUM SERPL-MCNC: 1.8 MG/DL (ref 1.6–2.6)
MCH RBC QN AUTO: 27.7 PG (ref 27–31)
MCHC RBC AUTO-ENTMCNC: 31.3 G/DL (ref 32–36)
MCV RBC AUTO: 89 FL (ref 82–98)
MONOCYTES # BLD AUTO: 0.8 K/UL (ref 0.3–1)
MONOCYTES NFR BLD: 11 % (ref 4–15)
NEUTROPHILS # BLD AUTO: 4.2 K/UL (ref 1.8–7.7)
NEUTROPHILS NFR BLD: 60.9 % (ref 38–73)
NRBC BLD-RTO: 0 /100 WBC
PHOSPHATE SERPL-MCNC: 4.3 MG/DL (ref 2.7–4.5)
PLATELET # BLD AUTO: 349 K/UL (ref 150–450)
PMV BLD AUTO: 10.9 FL (ref 9.2–12.9)
POTASSIUM SERPL-SCNC: 3.5 MMOL/L (ref 3.5–5.1)
PROT SERPL-MCNC: 7.1 G/DL (ref 6–8.4)
RBC # BLD AUTO: 3.79 M/UL (ref 4.6–6.2)
SODIUM SERPL-SCNC: 139 MMOL/L (ref 136–145)
WBC # BLD AUTO: 6.98 K/UL (ref 3.9–12.7)

## 2023-10-27 PROCEDURE — 25000003 PHARM REV CODE 250

## 2023-10-27 PROCEDURE — 99233 PR SUBSEQUENT HOSPITAL CARE,LEVL III: ICD-10-PCS | Mod: ,,, | Performed by: HOSPITALIST

## 2023-10-27 PROCEDURE — 25000003 PHARM REV CODE 250: Performed by: STUDENT IN AN ORGANIZED HEALTH CARE EDUCATION/TRAINING PROGRAM

## 2023-10-27 PROCEDURE — 84100 ASSAY OF PHOSPHORUS: CPT

## 2023-10-27 PROCEDURE — 99233 SBSQ HOSP IP/OBS HIGH 50: CPT | Mod: ,,, | Performed by: HOSPITALIST

## 2023-10-27 PROCEDURE — 63600175 PHARM REV CODE 636 W HCPCS

## 2023-10-27 PROCEDURE — 36415 COLL VENOUS BLD VENIPUNCTURE: CPT

## 2023-10-27 PROCEDURE — 63600175 PHARM REV CODE 636 W HCPCS: Performed by: STUDENT IN AN ORGANIZED HEALTH CARE EDUCATION/TRAINING PROGRAM

## 2023-10-27 PROCEDURE — 12000002 HC ACUTE/MED SURGE SEMI-PRIVATE ROOM

## 2023-10-27 PROCEDURE — 80053 COMPREHEN METABOLIC PANEL: CPT

## 2023-10-27 PROCEDURE — 85025 COMPLETE CBC W/AUTO DIFF WBC: CPT

## 2023-10-27 PROCEDURE — 83735 ASSAY OF MAGNESIUM: CPT

## 2023-10-27 PROCEDURE — 25000003 PHARM REV CODE 250: Performed by: INTERNAL MEDICINE

## 2023-10-27 RX ORDER — POTASSIUM CHLORIDE 20 MEQ/1
40 TABLET, EXTENDED RELEASE ORAL ONCE
Status: DISCONTINUED | OUTPATIENT
Start: 2023-10-27 | End: 2023-10-30

## 2023-10-27 RX ORDER — HYDROMORPHONE HYDROCHLORIDE 1 MG/ML
1.5 INJECTION, SOLUTION INTRAMUSCULAR; INTRAVENOUS; SUBCUTANEOUS EVERY 6 HOURS PRN
Status: DISCONTINUED | OUTPATIENT
Start: 2023-10-27 | End: 2023-10-27

## 2023-10-27 RX ORDER — SODIUM CHLORIDE 9 MG/ML
INJECTION, SOLUTION INTRAVENOUS CONTINUOUS
Status: ACTIVE | OUTPATIENT
Start: 2023-10-27 | End: 2023-10-27

## 2023-10-27 RX ORDER — IBUPROFEN 200 MG
600 TABLET ORAL 3 TIMES DAILY
Status: DISPENSED | OUTPATIENT
Start: 2023-10-27 | End: 2023-10-30

## 2023-10-27 RX ORDER — AMOXICILLIN 250 MG
2 CAPSULE ORAL 2 TIMES DAILY
Status: DISCONTINUED | OUTPATIENT
Start: 2023-10-27 | End: 2023-10-29

## 2023-10-27 RX ORDER — KETOROLAC TROMETHAMINE 30 MG/ML
30 INJECTION, SOLUTION INTRAMUSCULAR; INTRAVENOUS ONCE
Status: COMPLETED | OUTPATIENT
Start: 2023-10-27 | End: 2023-10-27

## 2023-10-27 RX ORDER — HYDROMORPHONE HYDROCHLORIDE 1 MG/ML
1.5 INJECTION, SOLUTION INTRAMUSCULAR; INTRAVENOUS; SUBCUTANEOUS EVERY 4 HOURS PRN
Status: DISCONTINUED | OUTPATIENT
Start: 2023-10-27 | End: 2023-10-28

## 2023-10-27 RX ORDER — POLYETHYLENE GLYCOL 3350 17 G/17G
17 POWDER, FOR SOLUTION ORAL 2 TIMES DAILY
Status: DISCONTINUED | OUTPATIENT
Start: 2023-10-27 | End: 2023-10-31

## 2023-10-27 RX ADMIN — HYDROMORPHONE HYDROCHLORIDE 2 MG: 2 INJECTION, SOLUTION INTRAMUSCULAR; INTRAVENOUS; SUBCUTANEOUS at 08:10

## 2023-10-27 RX ADMIN — HYDROMORPHONE HYDROCHLORIDE 1.5 MG: 1 INJECTION, SOLUTION INTRAMUSCULAR; INTRAVENOUS; SUBCUTANEOUS at 04:10

## 2023-10-27 RX ADMIN — SENNOSIDES AND DOCUSATE SODIUM 1 TABLET: 50; 8.6 TABLET ORAL at 08:10

## 2023-10-27 RX ADMIN — KETOROLAC TROMETHAMINE 30 MG: 30 INJECTION INTRAMUSCULAR; INTRAVENOUS at 01:10

## 2023-10-27 RX ADMIN — IBUPROFEN 600 MG: 200 TABLET, FILM COATED ORAL at 02:10

## 2023-10-27 RX ADMIN — MORPHINE SULFATE 15 MG: 15 TABLET, EXTENDED RELEASE ORAL at 09:10

## 2023-10-27 RX ADMIN — LACTULOSE 20 G: 20 SOLUTION ORAL at 08:10

## 2023-10-27 RX ADMIN — CEFEPIME 2 G: 2 INJECTION, POWDER, FOR SOLUTION INTRAVENOUS at 08:10

## 2023-10-27 RX ADMIN — BICTEGRAVIR SODIUM, EMTRICITABINE, AND TENOFOVIR ALAFENAMIDE FUMARATE 1 TABLET: 50; 200; 25 TABLET ORAL at 08:10

## 2023-10-27 RX ADMIN — APIXABAN 5 MG: 5 TABLET, FILM COATED ORAL at 08:10

## 2023-10-27 RX ADMIN — IBUPROFEN 600 MG: 200 TABLET, FILM COATED ORAL at 08:10

## 2023-10-27 RX ADMIN — PANCRELIPASE 2 CAPSULE: 30000; 6000; 19000 CAPSULE, DELAYED RELEASE PELLETS ORAL at 06:10

## 2023-10-27 RX ADMIN — ONDANSETRON 4 MG: 2 INJECTION INTRAMUSCULAR; INTRAVENOUS at 04:10

## 2023-10-27 RX ADMIN — CEFEPIME 2 G: 2 INJECTION, POWDER, FOR SOLUTION INTRAVENOUS at 12:10

## 2023-10-27 RX ADMIN — GABAPENTIN 600 MG: 300 CAPSULE ORAL at 08:10

## 2023-10-27 RX ADMIN — ACETAMINOPHEN 1000 MG: 500 TABLET ORAL at 01:10

## 2023-10-27 RX ADMIN — HYDROMORPHONE HYDROCHLORIDE 2 MG: 2 INJECTION, SOLUTION INTRAMUSCULAR; INTRAVENOUS; SUBCUTANEOUS at 03:10

## 2023-10-27 RX ADMIN — ACETAMINOPHEN 1000 MG: 500 TABLET ORAL at 09:10

## 2023-10-27 RX ADMIN — LACTULOSE 20 G: 20 SOLUTION ORAL at 02:10

## 2023-10-27 RX ADMIN — CEFEPIME 2 G: 2 INJECTION, POWDER, FOR SOLUTION INTRAVENOUS at 03:10

## 2023-10-27 RX ADMIN — SODIUM CHLORIDE: 9 INJECTION, SOLUTION INTRAVENOUS at 10:10

## 2023-10-27 RX ADMIN — POLYETHYLENE GLYCOL 3350 17 G: 17 POWDER, FOR SOLUTION ORAL at 08:10

## 2023-10-27 RX ADMIN — Medication 6 MG: at 09:10

## 2023-10-27 RX ADMIN — PANTOPRAZOLE SODIUM 40 MG: 40 TABLET, DELAYED RELEASE ORAL at 08:10

## 2023-10-27 RX ADMIN — HYDROMORPHONE HYDROCHLORIDE 1.5 MG: 1 INJECTION, SOLUTION INTRAMUSCULAR; INTRAVENOUS; SUBCUTANEOUS at 11:10

## 2023-10-27 RX ADMIN — POTASSIUM BICARBONATE 25 MEQ: 978 TABLET, EFFERVESCENT ORAL at 09:10

## 2023-10-27 RX ADMIN — GABAPENTIN 600 MG: 300 CAPSULE ORAL at 02:10

## 2023-10-27 RX ADMIN — SODIUM CHLORIDE: 9 INJECTION, SOLUTION INTRAVENOUS at 08:10

## 2023-10-27 RX ADMIN — SENNOSIDES AND DOCUSATE SODIUM 2 TABLET: 50; 8.6 TABLET ORAL at 08:10

## 2023-10-27 RX ADMIN — FOLIC ACID 1 MG: 1 TABLET ORAL at 08:10

## 2023-10-27 RX ADMIN — HYDROMORPHONE HYDROCHLORIDE 1.5 MG: 1 INJECTION, SOLUTION INTRAMUSCULAR; INTRAVENOUS; SUBCUTANEOUS at 08:10

## 2023-10-27 RX ADMIN — POTASSIUM BICARBONATE 25 MEQ: 978 TABLET, EFFERVESCENT ORAL at 08:10

## 2023-10-27 RX ADMIN — PANCRELIPASE 2 CAPSULE: 30000; 6000; 19000 CAPSULE, DELAYED RELEASE PELLETS ORAL at 08:10

## 2023-10-27 RX ADMIN — PROMETHAZINE HYDROCHLORIDE 6.25 MG: 25 INJECTION INTRAMUSCULAR; INTRAVENOUS at 09:10

## 2023-10-27 NOTE — ASSESSMENT & PLAN NOTE
Splenic vein thrombus originally seen on 9/20 imaging, and persistent on most recent CT abdominal imaging. Started on DOAC at last admission, loading dose completed.     - Continue eliquis 5 mg BID

## 2023-10-27 NOTE — PROGRESS NOTES
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 He has been in and out of the hospital for the last two weeks. His most recent discharge was on10/19 after admission for recurrent symptoms of his acute necrotizing pancreatitis. He was discharged home on MSIR and Morphine which was working well until yesterday morning (10/21) when he started having increased abdominal pain w/nausea. He attempted to eat something but vomited. He denies any recent alcohol or tobacco use. He reports eating normally since discharge. Last meal was a grilled cheese yesterday for dinner. Last BM was loose and "oily" but normal color w/out blood present. He was evaluated by [facility] on his 10/5 admission who recommended against any intervention on the pancreatic collection due to immaturity. He has been on IV Cefepime since 10/5 with possible end date 11/2.     In the ED, he was afebrile and tachycardic to the 130s. Labs were notable for K 3.3, Bicarb 21, BUN 5, Alb 3.0, Lipase 96. Lactate WNL. Repeat CT A/P showed small R pleural effusion but no acute intra-abdominopelvic abnormality. Peripancreatic fluid collection at the pancreatic tail stable from previous imaging. He was given Dilaudid 2mg x1, Dilaudid 1mg x2 for pain and his evening dose of Cefepime. Hospital Medicine was called for admission of       Overview/Hospital Course:  Admitted to hospital medicine. Cefepime and MS contin continued, " "with addition of scheduled tylenol + toradol and then breakthrough dilaudid. [facility] consulted again now that imaging findings were stable >4w since initial diagnosis and persistent symptoms. Underwent EUS with findings of stable fluid collection which was not drained ("Complex collections along the neck, body, and tail of pancreas (body/tail region likely connected). Not suitable for endoscopic transmural stenting/drainage.") He will need follow-up CT scan in 4-6 weeks. Patient will need adequate pain control prior to discharge; gabapentin added during admission. Cefepime to complete 11/2/23.       Interval History: Episode of upper abdominal/chest tightness overnight. EKG NSR. Given toradol overnight with some relief of symptoms. Still requesting 2mg dilaudid every 4 hours.     This AM, patient reporting continued tightness of upper abdomen/lower sternum, comparable to his pain prior to admission. Still reports not having a bowel movement.     Review of Systems   Constitutional:  Negative for chills, fatigue and fever.   HENT:  Negative for congestion and sore throat.    Respiratory:  Negative for cough and shortness of breath.    Cardiovascular:  Negative for chest pain.   Gastrointestinal:  Positive for abdominal pain and nausea. Negative for constipation, diarrhea and vomiting.   Genitourinary:  Negative for dysuria and flank pain.   Musculoskeletal:  Negative for joint swelling.   Neurological:  Negative for dizziness and headaches.   Psychiatric/Behavioral:  Negative for confusion. The patient is not nervous/anxious.      Objective:     Vital Signs (Most Recent):  Temp: 98.1 °F (36.7 °C) (10/27/23 0829)  Pulse: 96 (10/27/23 0829)  Resp: 18 (10/27/23 0829)  BP: 128/84 (10/27/23 0803)  SpO2: 95 % (10/27/23 0829) Vital Signs (24h Range):  Temp:  [98.1 °F (36.7 °C)-99.2 °F (37.3 °C)] 98.1 °F (36.7 °C)  Pulse:  [] 96  Resp:  [17-19] 18  SpO2:  [91 %-98 %] 95 %  BP: (116-128)/(70-84) 128/84     Weight: 68 kg (150 " "lb)  Body mass index is 21.52 kg/m².    Intake/Output Summary (Last 24 hours) at 10/27/2023 0831  Last data filed at 10/27/2023 0432  Gross per 24 hour   Intake --   Output 750 ml   Net -750 ml           Physical Exam  Constitutional:       Appearance: Normal appearance.   HENT:      Head: Normocephalic and atraumatic.      Nose: Nose normal.      Mouth/Throat:      Mouth: Mucous membranes are moist.   Eyes:      Extraocular Movements: Extraocular movements intact.      Pupils: Pupils are equal, round, and reactive to light.   Cardiovascular:      Rate and Rhythm: Normal rate and regular rhythm.   Pulmonary:      Effort: Pulmonary effort is normal.      Breath sounds: Normal breath sounds.   Abdominal:      Tenderness: There is abdominal tenderness.      Comments: Abdomen remains soft with normal bowel sounds. Nondistended. Mild tenderness with some voluntary guarding of bilateral lower quadrants and epigastrium.    Musculoskeletal:         General: Normal range of motion.      Cervical back: Normal range of motion.   Skin:     General: Skin is warm and dry.   Neurological:      General: No focal deficit present.      Mental Status: He is alert and oriented to person, place, and time.   Psychiatric:         Mood and Affect: Mood normal.         Behavior: Behavior normal.             Significant Labs: All pertinent labs within the past 24 hours have been reviewed.    Significant Imaging: I have reviewed all pertinent imaging results/findings within the past 24 hours.      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 Lipase mildly elevated. WBC and lactate WNL. On IV Cefepime for 4wk course. Discharged on MSIR and Morphine for pain control. EUS (10/23/23) "complex " "collections along the neck, body, and tail of pancreas. Not suitable for endoscopic transmural stenting/drainage." Follow CT in 4-6 weeks.     - Patient tolerating PO now: continue creon, low fat diet  - Continue Cefepime - per ID recs on previous admission, plan to treat until 11/2 with outpt follow up scheduled for 11/9  - paincontrol - Dilaudid 1.5 mg Q4H PRN; scheduled Tylenol, gabapentin 600 mg TID   - bowel regimen initiated - pericolace 2 tabs BID, miralax BID; lactulose 600 mg TID         Pancreatic pseudocyst/cyst  - F/u CT scan in 4 weeks per GI  - Continue pain control, creon, low fat diet.    Hypokalemia  K 3.3 on admission; likely 2/2 to decreased PO intake    - Gave IV K 10meq Q1H for 6 doses   - Replete K PRN       Continuous severe abdominal pain  Likely 2/2 to pancreatitis; Discharged home on Morphine taper which was working well until yesterday morning    - See Chronic Pancreatitis       Alcohol use disorder  Hx of EtOH use disorder. On most recent admission, patient's ethanol negative in setting of pancreatitis flare.   - monitor for signs of withdrawal, though patient denies EtOH use in between recent admissions      Splenic vein thrombosis  Splenic vein thrombus originally seen on 9/20 imaging, and persistent on most recent CT abdominal imaging. Started on DOAC at last admission, loading dose completed.     - Continue eliquis 5 mg BID      Mild intermittent asthma without complication  History of asthma requiring PRN albuterol. Has not recently been using any inhaler treatment.     Normocytic anemia  Admit with hemoglobin 10.2; Baseline ~ 10    Lab Results   Component Value Date    HGB 10.5 (L) 10/27/2023     - Daily CBC   - Transfuse hgb <7       Polycythemia vera  Hgb 10.5 on admission  - continue to monitor daily CBC      HIV infection  This patient in known to have HIV+ status (Have detected HIV PCR but never CD4 <200 or AIDS defining illness). Labs reviewed- No results found for: "CD4", No " "results found for: "HIVDNAPCR". Patient is on HAART. Will continue HAART. Continue to monitor routine labs. Last CD4 count was   Lab Results   Component Value Date    ABSOLUTECD4 1080 10/12/2023     - Continue home Biktarvy         VTE Risk Mitigation (From admission, onward)         Ordered     apixaban tablet 5 mg  2 times daily         10/24/23 0744                Discharge Planning   CASTILLO: 10/27/2023     Code Status: Full Code   Is the patient medically ready for discharge?:     Reason for patient still in hospital (select all that apply): Treatment  Discharge Plan A: Home with family                  Noah Trinh MD  Department of Hospital Medicine   [facility] - Intensive Care ([place] [id number])    "

## 2023-10-27 NOTE — SUBJECTIVE & OBJECTIVE
Interval History: Episode of upper abdominal/chest tightness overnight. EKG NSR. Given toradol overnight with some relief of symptoms. Still requesting 2mg dilaudid every 4 hours.     This AM, patient reporting continued tightness of upper abdomen/lower sternum, comparable to his pain prior to admission. Still reports not having a bowel movement.     Review of Systems   Constitutional:  Negative for chills, fatigue and fever.   HENT:  Negative for congestion and sore throat.    Respiratory:  Negative for cough and shortness of breath.    Cardiovascular:  Negative for chest pain.   Gastrointestinal:  Positive for abdominal pain and nausea. Negative for constipation, diarrhea and vomiting.   Genitourinary:  Negative for dysuria and flank pain.   Musculoskeletal:  Negative for joint swelling.   Neurological:  Negative for dizziness and headaches.   Psychiatric/Behavioral:  Negative for confusion. The patient is not nervous/anxious.      Objective:     Vital Signs (Most Recent):  Temp: 98.1 °F (36.7 °C) (10/27/23 0829)  Pulse: 96 (10/27/23 0829)  Resp: 18 (10/27/23 0829)  BP: 128/84 (10/27/23 0803)  SpO2: 95 % (10/27/23 0829) Vital Signs (24h Range):  Temp:  [98.1 °F (36.7 °C)-99.2 °F (37.3 °C)] 98.1 °F (36.7 °C)  Pulse:  [] 96  Resp:  [17-19] 18  SpO2:  [91 %-98 %] 95 %  BP: (116-128)/(70-84) 128/84     Weight: 68 kg (150 lb)  Body mass index is 21.52 kg/m².    Intake/Output Summary (Last 24 hours) at 10/27/2023 0831  Last data filed at 10/27/2023 0432  Gross per 24 hour   Intake --   Output 750 ml   Net -750 ml           Physical Exam  Constitutional:       Appearance: Normal appearance.   HENT:      Head: Normocephalic and atraumatic.      Nose: Nose normal.      Mouth/Throat:      Mouth: Mucous membranes are moist.   Eyes:      Extraocular Movements: Extraocular movements intact.      Pupils: Pupils are equal, round, and reactive to light.   Cardiovascular:      Rate and Rhythm: Normal rate and regular rhythm.    Pulmonary:      Effort: Pulmonary effort is normal.      Breath sounds: Normal breath sounds.   Abdominal:      Tenderness: There is abdominal tenderness.      Comments: Abdomen remains soft with normal bowel sounds. Nondistended. Mild tenderness with some voluntary guarding of bilateral lower quadrants and epigastrium.    Musculoskeletal:         General: Normal range of motion.      Cervical back: Normal range of motion.   Skin:     General: Skin is warm and dry.   Neurological:      General: No focal deficit present.      Mental Status: He is alert and oriented to person, place, and time.   Psychiatric:         Mood and Affect: Mood normal.         Behavior: Behavior normal.             Significant Labs: All pertinent labs within the past 24 hours have been reviewed.    Significant Imaging: I have reviewed all pertinent imaging results/findings within the past 24 hours.

## 2023-10-27 NOTE — ASSESSMENT & PLAN NOTE
Admit with hemoglobin 10.2; Baseline ~ 10    Lab Results   Component Value Date    HGB 10.5 (L) 10/27/2023     - Daily CBC   - Transfuse hgb <7

## 2023-10-27 NOTE — PLAN OF CARE
Problem: Adult Inpatient Plan of Care  Goal: Plan of Care Review  Outcome: Ongoing, Progressing  Goal: Patient-Specific Goal (Individualized)  Outcome: Ongoing, Progressing  Goal: Absence of Hospital-Acquired Illness or Injury  Outcome: Ongoing, Progressing  Goal: Optimal Comfort and Wellbeing  Outcome: Ongoing, Progressing     Problem: Infection  Goal: Absence of Infection Signs and Symptoms  Outcome: Ongoing, Progressing     Problem: Pain Acute  Goal: Acceptable Pain Control and Functional Ability  Outcome: Ongoing, Progressing     Problem: Nausea and Vomiting  Goal: Fluid and Electrolyte Balance  Outcome: Ongoing, Progressing

## 2023-10-27 NOTE — ASSESSMENT & PLAN NOTE
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 Lipase mildly elevated. WBC and lactate WNL. On IV Cefepime for 4wk course. Discharged on MSIR and Morphine for pain control. EUS (10/23/23) "complex collections along the neck, body, and tail of pancreas. Not suitable for endoscopic transmural stenting/drainage." Follow CT in 4-6 weeks.     - Patient tolerating PO now: continue creon, low fat diet  - Continue Cefepime - per ID recs on previous admission, plan to treat until 11/2 with outpt follow up scheduled for 11/9  - paincontrol - Dilaudid 1.5 mg Q4H PRN; scheduled Tylenol, gabapentin 600 mg TID   - bowel regimen initiated - pericolace 2 tabs BID, miralax BID; lactulose 600 mg TID       "

## 2023-10-28 PROBLEM — T40.2X5A THERAPEUTIC OPIOID-INDUCED CONSTIPATION (OIC): Status: ACTIVE | Noted: 2023-10-28

## 2023-10-28 PROBLEM — K59.03 THERAPEUTIC OPIOID-INDUCED CONSTIPATION (OIC): Status: ACTIVE | Noted: 2023-10-28

## 2023-10-28 LAB
ALBUMIN SERPL BCP-MCNC: 2.6 G/DL (ref 3.5–5.2)
ALP SERPL-CCNC: 73 U/L (ref 55–135)
ALT SERPL W/O P-5'-P-CCNC: 6 U/L (ref 10–44)
ANION GAP SERPL CALC-SCNC: 11 MMOL/L (ref 8–16)
AST SERPL-CCNC: 11 U/L (ref 10–40)
BASOPHILS # BLD AUTO: 0.03 K/UL (ref 0–0.2)
BASOPHILS NFR BLD: 0.5 % (ref 0–1.9)
BILIRUB SERPL-MCNC: 0.2 MG/DL (ref 0.1–1)
BUN SERPL-MCNC: 8 MG/DL (ref 6–20)
CALCIUM SERPL-MCNC: 8.6 MG/DL (ref 8.7–10.5)
CHLORIDE SERPL-SCNC: 107 MMOL/L (ref 95–110)
CO2 SERPL-SCNC: 21 MMOL/L (ref 23–29)
CREAT SERPL-MCNC: 0.6 MG/DL (ref 0.5–1.4)
DIFFERENTIAL METHOD: ABNORMAL
EOSINOPHIL # BLD AUTO: 0.8 K/UL (ref 0–0.5)
EOSINOPHIL NFR BLD: 12.3 % (ref 0–8)
ERYTHROCYTE [DISTWIDTH] IN BLOOD BY AUTOMATED COUNT: 18.1 % (ref 11.5–14.5)
EST. GFR  (NO RACE VARIABLE): >60 ML/MIN/1.73 M^2
GLUCOSE SERPL-MCNC: 98 MG/DL (ref 70–110)
HCT VFR BLD AUTO: 31.3 % (ref 40–54)
HGB BLD-MCNC: 9.3 G/DL (ref 14–18)
IMM GRANULOCYTES # BLD AUTO: 0.04 K/UL (ref 0–0.04)
IMM GRANULOCYTES NFR BLD AUTO: 0.6 % (ref 0–0.5)
LYMPHOCYTES # BLD AUTO: 1.9 K/UL (ref 1–4.8)
LYMPHOCYTES NFR BLD: 29.5 % (ref 18–48)
MAGNESIUM SERPL-MCNC: 1.9 MG/DL (ref 1.6–2.6)
MCH RBC QN AUTO: 27 PG (ref 27–31)
MCHC RBC AUTO-ENTMCNC: 29.7 G/DL (ref 32–36)
MCV RBC AUTO: 91 FL (ref 82–98)
MONOCYTES # BLD AUTO: 0.8 K/UL (ref 0.3–1)
MONOCYTES NFR BLD: 12.3 % (ref 4–15)
NEUTROPHILS # BLD AUTO: 3 K/UL (ref 1.8–7.7)
NEUTROPHILS NFR BLD: 44.8 % (ref 38–73)
NRBC BLD-RTO: 0 /100 WBC
PHOSPHATE SERPL-MCNC: 4.2 MG/DL (ref 2.7–4.5)
PLATELET # BLD AUTO: 273 K/UL (ref 150–450)
PMV BLD AUTO: 10.2 FL (ref 9.2–12.9)
POTASSIUM SERPL-SCNC: 4.3 MMOL/L (ref 3.5–5.1)
PROT SERPL-MCNC: 6.2 G/DL (ref 6–8.4)
RBC # BLD AUTO: 3.44 M/UL (ref 4.6–6.2)
SODIUM SERPL-SCNC: 139 MMOL/L (ref 136–145)
WBC # BLD AUTO: 6.58 K/UL (ref 3.9–12.7)

## 2023-10-28 PROCEDURE — 36415 COLL VENOUS BLD VENIPUNCTURE: CPT

## 2023-10-28 PROCEDURE — 25000003 PHARM REV CODE 250

## 2023-10-28 PROCEDURE — 25000003 PHARM REV CODE 250: Performed by: STUDENT IN AN ORGANIZED HEALTH CARE EDUCATION/TRAINING PROGRAM

## 2023-10-28 PROCEDURE — 25000003 PHARM REV CODE 250: Performed by: INTERNAL MEDICINE

## 2023-10-28 PROCEDURE — 85025 COMPLETE CBC W/AUTO DIFF WBC: CPT

## 2023-10-28 PROCEDURE — 84100 ASSAY OF PHOSPHORUS: CPT

## 2023-10-28 PROCEDURE — 99233 SBSQ HOSP IP/OBS HIGH 50: CPT | Mod: ,,, | Performed by: HOSPITALIST

## 2023-10-28 PROCEDURE — 63600175 PHARM REV CODE 636 W HCPCS

## 2023-10-28 PROCEDURE — 80053 COMPREHEN METABOLIC PANEL: CPT

## 2023-10-28 PROCEDURE — 12000002 HC ACUTE/MED SURGE SEMI-PRIVATE ROOM

## 2023-10-28 PROCEDURE — 83735 ASSAY OF MAGNESIUM: CPT

## 2023-10-28 PROCEDURE — 99233 PR SUBSEQUENT HOSPITAL CARE,LEVL III: ICD-10-PCS | Mod: ,,, | Performed by: HOSPITALIST

## 2023-10-28 RX ORDER — HYDROMORPHONE HYDROCHLORIDE 2 MG/ML
2 INJECTION, SOLUTION INTRAMUSCULAR; INTRAVENOUS; SUBCUTANEOUS ONCE
Status: COMPLETED | OUTPATIENT
Start: 2023-10-28 | End: 2023-10-28

## 2023-10-28 RX ORDER — LACTULOSE 10 G/15ML
30 SOLUTION ORAL 3 TIMES DAILY
Status: DISCONTINUED | OUTPATIENT
Start: 2023-10-28 | End: 2023-10-29

## 2023-10-28 RX ORDER — SODIUM CHLORIDE, SODIUM LACTATE, POTASSIUM CHLORIDE, CALCIUM CHLORIDE 600; 310; 30; 20 MG/100ML; MG/100ML; MG/100ML; MG/100ML
INJECTION, SOLUTION INTRAVENOUS CONTINUOUS
Status: ACTIVE | OUTPATIENT
Start: 2023-10-28 | End: 2023-10-28

## 2023-10-28 RX ORDER — HYDROXYZINE PAMOATE 25 MG/1
25 CAPSULE ORAL EVERY 8 HOURS PRN
Status: DISCONTINUED | OUTPATIENT
Start: 2023-10-28 | End: 2023-10-28

## 2023-10-28 RX ORDER — HYDROXYZINE PAMOATE 25 MG/1
25 CAPSULE ORAL EVERY 8 HOURS PRN
Status: DISCONTINUED | OUTPATIENT
Start: 2023-10-28 | End: 2023-11-10 | Stop reason: HOSPADM

## 2023-10-28 RX ORDER — HYDROMORPHONE HYDROCHLORIDE 1 MG/ML
1.5 INJECTION, SOLUTION INTRAMUSCULAR; INTRAVENOUS; SUBCUTANEOUS EVERY 4 HOURS PRN
Status: DISCONTINUED | OUTPATIENT
Start: 2023-10-28 | End: 2023-10-29

## 2023-10-28 RX ADMIN — POLYETHYLENE GLYCOL 3350 17 G: 17 POWDER, FOR SOLUTION ORAL at 08:10

## 2023-10-28 RX ADMIN — APIXABAN 5 MG: 5 TABLET, FILM COATED ORAL at 08:10

## 2023-10-28 RX ADMIN — GABAPENTIN 600 MG: 300 CAPSULE ORAL at 08:10

## 2023-10-28 RX ADMIN — CEFEPIME 2 G: 2 INJECTION, POWDER, FOR SOLUTION INTRAVENOUS at 12:10

## 2023-10-28 RX ADMIN — PANCRELIPASE 2 CAPSULE: 30000; 6000; 19000 CAPSULE, DELAYED RELEASE PELLETS ORAL at 04:10

## 2023-10-28 RX ADMIN — PANTOPRAZOLE SODIUM 40 MG: 40 TABLET, DELAYED RELEASE ORAL at 08:10

## 2023-10-28 RX ADMIN — PROMETHAZINE HYDROCHLORIDE 6.25 MG: 25 INJECTION INTRAMUSCULAR; INTRAVENOUS at 09:10

## 2023-10-28 RX ADMIN — HYDROMORPHONE HYDROCHLORIDE 1.5 MG: 1 INJECTION, SOLUTION INTRAMUSCULAR; INTRAVENOUS; SUBCUTANEOUS at 08:10

## 2023-10-28 RX ADMIN — BICTEGRAVIR SODIUM, EMTRICITABINE, AND TENOFOVIR ALAFENAMIDE FUMARATE 1 TABLET: 50; 200; 25 TABLET ORAL at 09:10

## 2023-10-28 RX ADMIN — IBUPROFEN 600 MG: 200 TABLET, FILM COATED ORAL at 08:10

## 2023-10-28 RX ADMIN — PANCRELIPASE 2 CAPSULE: 30000; 6000; 19000 CAPSULE, DELAYED RELEASE PELLETS ORAL at 12:10

## 2023-10-28 RX ADMIN — HYDROMORPHONE HYDROCHLORIDE 1.5 MG: 1 INJECTION, SOLUTION INTRAMUSCULAR; INTRAVENOUS; SUBCUTANEOUS at 01:10

## 2023-10-28 RX ADMIN — PANCRELIPASE 2 CAPSULE: 30000; 6000; 19000 CAPSULE, DELAYED RELEASE PELLETS ORAL at 08:10

## 2023-10-28 RX ADMIN — LACTULOSE 30 G: 20 SOLUTION ORAL at 08:10

## 2023-10-28 RX ADMIN — LACTULOSE 30 G: 20 SOLUTION ORAL at 04:10

## 2023-10-28 RX ADMIN — ACETAMINOPHEN 1000 MG: 500 TABLET ORAL at 05:10

## 2023-10-28 RX ADMIN — CEFEPIME 2 G: 2 INJECTION, POWDER, FOR SOLUTION INTRAVENOUS at 08:10

## 2023-10-28 RX ADMIN — CEFEPIME 2 G: 2 INJECTION, POWDER, FOR SOLUTION INTRAVENOUS at 03:10

## 2023-10-28 RX ADMIN — SENNOSIDES AND DOCUSATE SODIUM 2 TABLET: 50; 8.6 TABLET ORAL at 08:10

## 2023-10-28 RX ADMIN — Medication 6 MG: at 08:10

## 2023-10-28 RX ADMIN — ACETAMINOPHEN 1000 MG: 500 TABLET ORAL at 02:10

## 2023-10-28 RX ADMIN — ACETAMINOPHEN 1000 MG: 500 TABLET ORAL at 10:10

## 2023-10-28 RX ADMIN — PROMETHAZINE HYDROCHLORIDE 6.25 MG: 25 INJECTION INTRAMUSCULAR; INTRAVENOUS at 11:10

## 2023-10-28 RX ADMIN — HYDROMORPHONE HYDROCHLORIDE 2 MG: 2 INJECTION, SOLUTION INTRAMUSCULAR; INTRAVENOUS; SUBCUTANEOUS at 12:10

## 2023-10-28 RX ADMIN — MORPHINE SULFATE 15 MG: 15 TABLET, EXTENDED RELEASE ORAL at 08:10

## 2023-10-28 RX ADMIN — HYDROMORPHONE HYDROCHLORIDE 1.5 MG: 1 INJECTION, SOLUTION INTRAMUSCULAR; INTRAVENOUS; SUBCUTANEOUS at 04:10

## 2023-10-28 RX ADMIN — IBUPROFEN 600 MG: 200 TABLET, FILM COATED ORAL at 04:10

## 2023-10-28 RX ADMIN — FOLIC ACID 1 MG: 1 TABLET ORAL at 08:10

## 2023-10-28 RX ADMIN — HYDROXYZINE PAMOATE 25 MG: 25 CAPSULE ORAL at 12:10

## 2023-10-28 RX ADMIN — ONDANSETRON 4 MG: 2 INJECTION INTRAMUSCULAR; INTRAVENOUS at 05:10

## 2023-10-28 RX ADMIN — HYDROMORPHONE HYDROCHLORIDE 1.5 MG: 1 INJECTION, SOLUTION INTRAMUSCULAR; INTRAVENOUS; SUBCUTANEOUS at 09:10

## 2023-10-28 RX ADMIN — ONDANSETRON 4 MG: 2 INJECTION INTRAMUSCULAR; INTRAVENOUS at 09:10

## 2023-10-28 RX ADMIN — MORPHINE SULFATE 15 MG: 15 TABLET, EXTENDED RELEASE ORAL at 09:10

## 2023-10-28 RX ADMIN — HYDROMORPHONE HYDROCHLORIDE 1.5 MG: 1 INJECTION, SOLUTION INTRAMUSCULAR; INTRAVENOUS; SUBCUTANEOUS at 05:10

## 2023-10-28 RX ADMIN — GABAPENTIN 600 MG: 300 CAPSULE ORAL at 04:10

## 2023-10-28 RX ADMIN — SODIUM CHLORIDE, POTASSIUM CHLORIDE, SODIUM LACTATE AND CALCIUM CHLORIDE: 600; 310; 30; 20 INJECTION, SOLUTION INTRAVENOUS at 11:10

## 2023-10-28 NOTE — PLAN OF CARE
Patient AAOX4, makes needs known. Patient reports larger bowel movement x 2 today, appetite seems to be increasing. Fluid intake good. Continues with intermittent nausea and constant pain, pain is ranging from 7-10/10 today, small improvement since yesterday. Medicated as ordered and as needed. LR IV fluids at 100ml/hr started. Patient c/o anxiety and wanting to prevent a panic attack, states gets them often when he gets worried about his dog and added stress anxiety of being in the hospital. MD added prn vistaril, given and patient states it was helpful. Bed low and locked and call light in reach.       Problem: Adult Inpatient Plan of Care  Goal: Plan of Care Review  Outcome: Ongoing, Progressing  Goal: Patient-Specific Goal (Individualized)  Outcome: Ongoing, Progressing  Goal: Absence of Hospital-Acquired Illness or Injury  Outcome: Ongoing, Progressing  Goal: Optimal Comfort and Wellbeing  Outcome: Ongoing, Progressing  Goal: Readiness for Transition of Care  Outcome: Ongoing, Progressing     Problem: Nausea and Vomiting  Goal: Fluid and Electrolyte Balance  Outcome: Ongoing, Progressing     Problem: Fluid Imbalance (Pancreatitis)  Goal: Fluid Balance  Outcome: Ongoing, Progressing     Problem: Infection (Pancreatitis)  Goal: Infection Symptom Resolution  Outcome: Ongoing, Progressing     Problem: Nutrition Impaired (Pancreatitis)  Goal: Optimal Nutrition Intake  Outcome: Ongoing, Progressing     Problem: Pain (Pancreatitis)  Goal: Acceptable Pain Control  Outcome: Ongoing, Progressing

## 2023-10-28 NOTE — ASSESSMENT & PLAN NOTE
Admit with hemoglobin 10.2; Baseline ~ 10    Lab Results   Component Value Date    HGB 9.3 (L) 10/28/2023     - Daily CBC   - Transfuse hgb <7

## 2023-10-28 NOTE — ASSESSMENT & PLAN NOTE
Patient with extended course of high-potency opioids since being hospitalized multiple times for pancreatitis, starting on 9/17/23. KUB (10/27/23) notable for high-stool burden, though without focal dilatation, transition points, or free air. Patient with increased abdominal pain that is different in character and location from description of pain on admission, now including dull, achey pain in belt like distribution with intermittent nausea.     - continue current bowel regimen: zehra colace 2 tabs BID, miralax BID, lactulose 30 TID  - can add enemas if needed

## 2023-10-28 NOTE — SUBJECTIVE & OBJECTIVE
Interval History: Patient resting in bed comfortably, watching television after eating [place] toast this morning. He is tolerating his meals well, though still complains of persistent, and at times, worsening abdominal pain. Patient continues to request dilaudid every 4 hours.      Review of Systems   Constitutional:  Negative for chills, fatigue and fever.   HENT:  Negative for congestion and sore throat.    Respiratory:  Negative for cough and shortness of breath.    Cardiovascular:  Negative for chest pain.   Gastrointestinal:  Positive for abdominal pain, constipation and nausea. Negative for diarrhea and vomiting.   Genitourinary:  Negative for dysuria and flank pain.   Musculoskeletal:  Negative for joint swelling.   Neurological:  Negative for dizziness and headaches.   Psychiatric/Behavioral:  Negative for confusion. The patient is not nervous/anxious.      Objective:     Vital Signs (Most Recent):  Temp: 98.3 °F (36.8 °C) (10/28/23 1118)  Pulse: 85 (10/28/23 1118)  Resp: 17 (10/28/23 1206)  BP: 111/77 (10/28/23 1118)  SpO2: (!) 93 % (10/28/23 1118) Vital Signs (24h Range):  Temp:  [97.8 °F (36.6 °C)-99.3 °F (37.4 °C)] 98.3 °F (36.8 °C)  Pulse:  [] 85  Resp:  [17-20] 17  SpO2:  [93 %-97 %] 93 %  BP: (111-123)/(67-82) 111/77     Weight: 68 kg (150 lb)  Body mass index is 21.52 kg/m².    Intake/Output Summary (Last 24 hours) at 10/28/2023 1233  Last data filed at 10/28/2023 1206  Gross per 24 hour   Intake 2030.01 ml   Output --   Net 2030.01 ml           Physical Exam  Constitutional:       Appearance: Normal appearance.      Comments: Lying in bed comfortably, watching television after full breakfast   HENT:      Head: Normocephalic and atraumatic.      Nose: Nose normal.      Mouth/Throat:      Mouth: Mucous membranes are moist.   Eyes:      Extraocular Movements: Extraocular movements intact.      Pupils: Pupils are equal, round, and reactive to light.   Cardiovascular:      Rate and Rhythm: Normal  rate and regular rhythm.   Pulmonary:      Effort: Pulmonary effort is normal.      Breath sounds: Normal breath sounds.   Abdominal:      Tenderness: There is abdominal tenderness.      Comments: Abdomen remains soft with normal bowel sounds. Nondistended. Mild tenderness with some voluntary guarding of bilateral lower quadrants and epigastrium.    Musculoskeletal:         General: Normal range of motion.      Cervical back: Normal range of motion.   Skin:     General: Skin is warm and dry.   Neurological:      General: No focal deficit present.      Mental Status: He is alert and oriented to person, place, and time.   Psychiatric:         Mood and Affect: Mood normal.         Behavior: Behavior normal.             Significant Labs: All pertinent labs within the past 24 hours have been reviewed.    Significant Imaging: I have reviewed all pertinent imaging results/findings within the past 24 hours.

## 2023-10-28 NOTE — PROGRESS NOTES
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 He has been in and out of the hospital for the last two weeks. His most recent discharge was on10/19 after admission for recurrent symptoms of his acute necrotizing pancreatitis. He was discharged home on MSIR and Morphine which was working well until yesterday morning (10/21) when he started having increased abdominal pain w/nausea. He attempted to eat something but vomited. He denies any recent alcohol or tobacco use. He reports eating normally since discharge. Last meal was a grilled cheese yesterday for dinner. Last BM was loose and "oily" but normal color w/out blood present. He was evaluated by [facility] on his 10/5 admission who recommended against any intervention on the pancreatic collection due to immaturity. He has been on IV Cefepime since 10/5 with possible end date 11/2.     In the ED, he was afebrile and tachycardic to the 130s. Labs were notable for K 3.3, Bicarb 21, BUN 5, Alb 3.0, Lipase 96. Lactate WNL. Repeat CT A/P showed small R pleural effusion but no acute intra-abdominopelvic abnormality. Peripancreatic fluid collection at the pancreatic tail stable from previous imaging. He was given Dilaudid 2mg x1, Dilaudid 1mg x2 for pain and his evening dose of Cefepime. Hospital Medicine was called for admission of       Overview/Hospital Course:  Admitted to hospital medicine. Cefepime and MS contin continued, " "with addition of scheduled tylenol + toradol and then breakthrough dilaudid. [facility] consulted again now that imaging findings were stable >4w since initial diagnosis and persistent symptoms. Underwent EUS with findings of stable fluid collection which was not drained ("Complex collections along the neck, body, and tail of pancreas (body/tail region likely connected). Not suitable for endoscopic transmural stenting/drainage.") He will need follow-up CT scan in 4-6 weeks. Patient will need adequate pain control prior to discharge; gabapentin added during admission. Cefepime to complete 11/2/23.       Interval History: Patient resting in bed comfortably, watching television after eating [place] citizen toast this morning. He is tolerating his meals well, though still complains of persistent, and at times, worsening abdominal pain. Patient continues to request dilaudid every 4 hours.      Review of Systems   Constitutional:  Negative for chills, fatigue and fever.   HENT:  Negative for congestion and sore throat.    Respiratory:  Negative for cough and shortness of breath.    Cardiovascular:  Negative for chest pain.   Gastrointestinal:  Positive for abdominal pain, constipation and nausea. Negative for diarrhea and vomiting.   Genitourinary:  Negative for dysuria and flank pain.   Musculoskeletal:  Negative for joint swelling.   Neurological:  Negative for dizziness and headaches.   Psychiatric/Behavioral:  Negative for confusion. The patient is not nervous/anxious.      Objective:     Vital Signs (Most Recent):  Temp: 98.3 °F (36.8 °C) (10/28/23 1118)  Pulse: 85 (10/28/23 1118)  Resp: 17 (10/28/23 1206)  BP: 111/77 (10/28/23 1118)  SpO2: (!) 93 % (10/28/23 1118) Vital Signs (24h Range):  Temp:  [97.8 °F (36.6 °C)-99.3 °F (37.4 °C)] 98.3 °F (36.8 °C)  Pulse:  [] 85  Resp:  [17-20] 17  SpO2:  [93 %-97 %] 93 %  BP: (111-123)/(67-82) 111/77     Weight: 68 kg (150 lb)  Body mass index is 21.52 kg/m².    Intake/Output Summary " (Last 24 hours) at 10/28/2023 1233  Last data filed at 10/28/2023 1206  Gross per 24 hour   Intake 2030.01 ml   Output --   Net 2030.01 ml           Physical Exam  Constitutional:       Appearance: Normal appearance.      Comments: Lying in bed comfortably, watching television after full breakfast   HENT:      Head: Normocephalic and atraumatic.      Nose: Nose normal.      Mouth/Throat:      Mouth: Mucous membranes are moist.   Eyes:      Extraocular Movements: Extraocular movements intact.      Pupils: Pupils are equal, round, and reactive to light.   Cardiovascular:      Rate and Rhythm: Normal rate and regular rhythm.   Pulmonary:      Effort: Pulmonary effort is normal.      Breath sounds: Normal breath sounds.   Abdominal:      Tenderness: There is abdominal tenderness.      Comments: Abdomen remains soft with normal bowel sounds. Nondistended. Mild tenderness with some voluntary guarding of bilateral lower quadrants and epigastrium.    Musculoskeletal:         General: Normal range of motion.      Cervical back: Normal range of motion.   Skin:     General: Skin is warm and dry.   Neurological:      General: No focal deficit present.      Mental Status: He is alert and oriented to person, place, and time.   Psychiatric:         Mood and Affect: Mood normal.         Behavior: Behavior normal.             Significant Labs: All pertinent labs within the past 24 hours have been reviewed.    Significant Imaging: I have reviewed all pertinent imaging results/findings within the past 24 hours.      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 Lipase mildly elevated. WBC and lactate WNL. On IV Cefepime for 4wk course. Discharged on MSIR and Morphine for pain control. EUS  "(10/23/23) "complex collections along the neck, body, and tail of pancreas. Not suitable for endoscopic transmural stenting/drainage." Follow CT in 4-6 weeks.     - Patient tolerating PO now: continue creon, low fat diet  - Continue Cefepime - per ID recs on previous admission, plan to treat until 11/2 with outpt follow up scheduled for 11/9  - paincontrol - Dilaudid 1.5 mg Q4H PRN; scheduled Tylenol, gabapentin 600 mg TID   - bowel regimen initiated - pericolace 2 tabs BID, miralax BID; lactulose 30 mg TID         Therapeutic opioid-induced constipation (OIC)  Patient with extended course of high-potency opioids since being hospitalized multiple times for pancreatitis, starting on 9/17/23. KUB (10/27/23) notable for high-stool burden, though without focal dilatation, transition points, or free air. Patient with increased abdominal pain that is different in character and location from description of pain on admission, now including dull, achey pain in belt like distribution with intermittent nausea.     - continue current bowel regimen: zehra colace 2 tabs BID, miralax BID, lactulose 30 TID  - can add enemas if needed    Pancreatic pseudocyst/cyst  - F/u CT scan in 4 weeks per GI  - Continue pain control, creon, low fat diet.    Hypokalemia  K 3.3 on admission; likely 2/2 to decreased PO intake    - Gave IV K 10meq Q1H for 6 doses   - Replete K PRN       Continuous severe abdominal pain  Likely 2/2 to pancreatitis; Discharged home on Morphine taper which was working well until yesterday morning    - See Chronic Pancreatitis       Alcohol use disorder  Hx of EtOH use disorder. On most recent admission, patient's ethanol negative in setting of pancreatitis flare.   - monitor for signs of withdrawal, though patient denies EtOH use in between recent admissions      Splenic vein thrombosis  Splenic vein thrombus originally seen on 9/20 imaging, and persistent on most recent CT abdominal imaging. Started on DOAC at last " "admission, loading dose completed.     - Continue eliquis 5 mg BID      Mild intermittent asthma without complication  History of asthma requiring PRN albuterol. Has not recently been using any inhaler treatment.     Normocytic anemia  Admit with hemoglobin 10.2; Baseline ~ 10    Lab Results   Component Value Date    HGB 9.3 (L) 10/28/2023     - Daily CBC   - Transfuse hgb <7       Polycythemia vera  Hgb 10.5 on admission. Follows up with hematology as outpatient.   - continue to monitor daily CBC      HIV infection  This patient in known to have HIV+ status (Have detected HIV PCR but never CD4 <200 or AIDS defining illness). Labs reviewed- No results found for: "CD4", No results found for: "HIVDNAPCR". Patient is on HAART. Will continue HAART. Continue to monitor routine labs. Last CD4 count was   Lab Results   Component Value Date    ABSOLUTECD4 1080 10/12/2023     - Continue home Biktarvy         VTE Risk Mitigation (From admission, onward)         Ordered     apixaban tablet 5 mg  2 times daily         10/24/23 0744                Discharge Planning   CASTILLO: 10/30/2023     Code Status: Full Code   Is the patient medically ready for discharge?:     Reason for patient still in hospital (select all that apply): Patient trending condition.  Discharge Plan A: Home with family                  Noah Trinh MD  Department of Hospital Medicine   [facility] - Intensive Care ([place] [id number])    "

## 2023-10-28 NOTE — ASSESSMENT & PLAN NOTE
Hgb 10.5 on admission. Follows up with hematology as outpatient.   - continue to monitor daily CBC

## 2023-10-28 NOTE — PLAN OF CARE
Problem: Adult Inpatient Plan of Care  Goal: Plan of Care Review  Outcome: Ongoing, Progressing  Goal: Patient-Specific Goal (Individualized)  Outcome: Ongoing, Progressing  Goal: Absence of Hospital-Acquired Illness or Injury  Outcome: Ongoing, Progressing  Goal: Optimal Comfort and Wellbeing  Outcome: Ongoing, Progressing  Goal: Readiness for Transition of Care  Outcome: Ongoing, Progressing     Problem: Infection  Goal: Absence of Infection Signs and Symptoms  Outcome: Ongoing, Progressing     Problem: Pain Acute  Goal: Acceptable Pain Control and Functional Ability  Outcome: Ongoing, Progressing     Problem: Nausea and Vomiting  Goal: Fluid and Electrolyte Balance  Outcome: Ongoing, Progressing    Pt had an uneventful night . VSS. Pt given prn iv pain meds around the clock. Pt is free of falls and injuries. Bed in lowest position and call light within reach. Safety maintained .

## 2023-10-29 LAB
ALBUMIN SERPL BCP-MCNC: 2.7 G/DL (ref 3.5–5.2)
ALP SERPL-CCNC: 75 U/L (ref 55–135)
ALT SERPL W/O P-5'-P-CCNC: 9 U/L (ref 10–44)
ANION GAP SERPL CALC-SCNC: 12 MMOL/L (ref 8–16)
AST SERPL-CCNC: 15 U/L (ref 10–40)
BASOPHILS # BLD AUTO: 0.04 K/UL (ref 0–0.2)
BASOPHILS NFR BLD: 0.6 % (ref 0–1.9)
BILIRUB SERPL-MCNC: 0.3 MG/DL (ref 0.1–1)
BUN SERPL-MCNC: 7 MG/DL (ref 6–20)
CALCIUM SERPL-MCNC: 9.1 MG/DL (ref 8.7–10.5)
CHLORIDE SERPL-SCNC: 108 MMOL/L (ref 95–110)
CO2 SERPL-SCNC: 20 MMOL/L (ref 23–29)
CREAT SERPL-MCNC: 0.7 MG/DL (ref 0.5–1.4)
DIFFERENTIAL METHOD: ABNORMAL
EOSINOPHIL # BLD AUTO: 0.7 K/UL (ref 0–0.5)
EOSINOPHIL NFR BLD: 10.4 % (ref 0–8)
ERYTHROCYTE [DISTWIDTH] IN BLOOD BY AUTOMATED COUNT: 18.6 % (ref 11.5–14.5)
EST. GFR  (NO RACE VARIABLE): >60 ML/MIN/1.73 M^2
GLUCOSE SERPL-MCNC: 105 MG/DL (ref 70–110)
HCT VFR BLD AUTO: 29.5 % (ref 40–54)
HGB BLD-MCNC: 9 G/DL (ref 14–18)
IMM GRANULOCYTES # BLD AUTO: 0.01 K/UL (ref 0–0.04)
IMM GRANULOCYTES NFR BLD AUTO: 0.2 % (ref 0–0.5)
LYMPHOCYTES # BLD AUTO: 1.7 K/UL (ref 1–4.8)
LYMPHOCYTES NFR BLD: 26.7 % (ref 18–48)
MAGNESIUM SERPL-MCNC: 1.7 MG/DL (ref 1.6–2.6)
MCH RBC QN AUTO: 27.6 PG (ref 27–31)
MCHC RBC AUTO-ENTMCNC: 30.5 G/DL (ref 32–36)
MCV RBC AUTO: 91 FL (ref 82–98)
MONOCYTES # BLD AUTO: 0.8 K/UL (ref 0.3–1)
MONOCYTES NFR BLD: 11.8 % (ref 4–15)
NEUTROPHILS # BLD AUTO: 3.2 K/UL (ref 1.8–7.7)
NEUTROPHILS NFR BLD: 50.3 % (ref 38–73)
NRBC BLD-RTO: 0 /100 WBC
PHOSPHATE SERPL-MCNC: 3.9 MG/DL (ref 2.7–4.5)
PLATELET # BLD AUTO: 287 K/UL (ref 150–450)
PMV BLD AUTO: 10.7 FL (ref 9.2–12.9)
POTASSIUM SERPL-SCNC: 4.1 MMOL/L (ref 3.5–5.1)
PROT SERPL-MCNC: 6.5 G/DL (ref 6–8.4)
RBC # BLD AUTO: 3.26 M/UL (ref 4.6–6.2)
SODIUM SERPL-SCNC: 140 MMOL/L (ref 136–145)
WBC # BLD AUTO: 6.44 K/UL (ref 3.9–12.7)

## 2023-10-29 PROCEDURE — 85025 COMPLETE CBC W/AUTO DIFF WBC: CPT

## 2023-10-29 PROCEDURE — 25000003 PHARM REV CODE 250: Performed by: INTERNAL MEDICINE

## 2023-10-29 PROCEDURE — 83735 ASSAY OF MAGNESIUM: CPT

## 2023-10-29 PROCEDURE — 99233 PR SUBSEQUENT HOSPITAL CARE,LEVL III: ICD-10-PCS | Mod: ,,, | Performed by: HOSPITALIST

## 2023-10-29 PROCEDURE — 99233 SBSQ HOSP IP/OBS HIGH 50: CPT | Mod: ,,, | Performed by: HOSPITALIST

## 2023-10-29 PROCEDURE — 80053 COMPREHEN METABOLIC PANEL: CPT

## 2023-10-29 PROCEDURE — 25000003 PHARM REV CODE 250

## 2023-10-29 PROCEDURE — 63600175 PHARM REV CODE 636 W HCPCS

## 2023-10-29 PROCEDURE — 36415 COLL VENOUS BLD VENIPUNCTURE: CPT

## 2023-10-29 PROCEDURE — 12000002 HC ACUTE/MED SURGE SEMI-PRIVATE ROOM

## 2023-10-29 PROCEDURE — 63600175 PHARM REV CODE 636 W HCPCS: Performed by: STUDENT IN AN ORGANIZED HEALTH CARE EDUCATION/TRAINING PROGRAM

## 2023-10-29 PROCEDURE — 25000003 PHARM REV CODE 250: Performed by: STUDENT IN AN ORGANIZED HEALTH CARE EDUCATION/TRAINING PROGRAM

## 2023-10-29 PROCEDURE — 84100 ASSAY OF PHOSPHORUS: CPT

## 2023-10-29 PROCEDURE — 25500020 PHARM REV CODE 255: Performed by: HOSPITALIST

## 2023-10-29 RX ORDER — AMOXICILLIN 250 MG
2 CAPSULE ORAL DAILY
Status: DISCONTINUED | OUTPATIENT
Start: 2023-10-30 | End: 2023-10-31

## 2023-10-29 RX ORDER — HYDROMORPHONE HYDROCHLORIDE 2 MG/ML
2 INJECTION, SOLUTION INTRAMUSCULAR; INTRAVENOUS; SUBCUTANEOUS EVERY 4 HOURS PRN
Status: DISCONTINUED | OUTPATIENT
Start: 2023-10-29 | End: 2023-10-30

## 2023-10-29 RX ORDER — HYDROMORPHONE HYDROCHLORIDE 2 MG/ML
2 INJECTION, SOLUTION INTRAMUSCULAR; INTRAVENOUS; SUBCUTANEOUS
Status: COMPLETED | OUTPATIENT
Start: 2023-10-29 | End: 2023-10-29

## 2023-10-29 RX ADMIN — HYDROMORPHONE HYDROCHLORIDE 1.5 MG: 1 INJECTION, SOLUTION INTRAMUSCULAR; INTRAVENOUS; SUBCUTANEOUS at 07:10

## 2023-10-29 RX ADMIN — BICTEGRAVIR SODIUM, EMTRICITABINE, AND TENOFOVIR ALAFENAMIDE FUMARATE 1 TABLET: 50; 200; 25 TABLET ORAL at 08:10

## 2023-10-29 RX ADMIN — CEFEPIME 2 G: 2 INJECTION, POWDER, FOR SOLUTION INTRAVENOUS at 03:10

## 2023-10-29 RX ADMIN — HYDROMORPHONE HYDROCHLORIDE 2 MG: 2 INJECTION, SOLUTION INTRAMUSCULAR; INTRAVENOUS; SUBCUTANEOUS at 09:10

## 2023-10-29 RX ADMIN — ONDANSETRON 4 MG: 2 INJECTION INTRAMUSCULAR; INTRAVENOUS at 08:10

## 2023-10-29 RX ADMIN — HYDROXYZINE PAMOATE 25 MG: 25 CAPSULE ORAL at 03:10

## 2023-10-29 RX ADMIN — PANTOPRAZOLE SODIUM 40 MG: 40 TABLET, DELAYED RELEASE ORAL at 08:10

## 2023-10-29 RX ADMIN — CEFEPIME 2 G: 2 INJECTION, POWDER, FOR SOLUTION INTRAVENOUS at 08:10

## 2023-10-29 RX ADMIN — APIXABAN 5 MG: 5 TABLET, FILM COATED ORAL at 09:10

## 2023-10-29 RX ADMIN — ACETAMINOPHEN 1000 MG: 500 TABLET ORAL at 05:10

## 2023-10-29 RX ADMIN — PROMETHAZINE HYDROCHLORIDE 6.25 MG: 25 INJECTION INTRAMUSCULAR; INTRAVENOUS at 03:10

## 2023-10-29 RX ADMIN — PANTOPRAZOLE SODIUM 40 MG: 40 TABLET, DELAYED RELEASE ORAL at 09:10

## 2023-10-29 RX ADMIN — PANCRELIPASE 2 CAPSULE: 30000; 6000; 19000 CAPSULE, DELAYED RELEASE PELLETS ORAL at 05:10

## 2023-10-29 RX ADMIN — MORPHINE SULFATE 15 MG: 15 TABLET, EXTENDED RELEASE ORAL at 08:10

## 2023-10-29 RX ADMIN — MORPHINE SULFATE 15 MG: 15 TABLET, EXTENDED RELEASE ORAL at 09:10

## 2023-10-29 RX ADMIN — IBUPROFEN 600 MG: 200 TABLET, FILM COATED ORAL at 09:10

## 2023-10-29 RX ADMIN — FOLIC ACID 1 MG: 1 TABLET ORAL at 08:10

## 2023-10-29 RX ADMIN — ACETAMINOPHEN 1000 MG: 500 TABLET ORAL at 07:10

## 2023-10-29 RX ADMIN — HYDROMORPHONE HYDROCHLORIDE 1.5 MG: 1 INJECTION, SOLUTION INTRAMUSCULAR; INTRAVENOUS; SUBCUTANEOUS at 03:10

## 2023-10-29 RX ADMIN — Medication 6 MG: at 09:10

## 2023-10-29 RX ADMIN — APIXABAN 5 MG: 5 TABLET, FILM COATED ORAL at 08:10

## 2023-10-29 RX ADMIN — PANCRELIPASE 2 CAPSULE: 30000; 6000; 19000 CAPSULE, DELAYED RELEASE PELLETS ORAL at 02:10

## 2023-10-29 RX ADMIN — HYDROMORPHONE HYDROCHLORIDE 2 MG: 2 INJECTION, SOLUTION INTRAMUSCULAR; INTRAVENOUS; SUBCUTANEOUS at 01:10

## 2023-10-29 RX ADMIN — IBUPROFEN 600 MG: 200 TABLET, FILM COATED ORAL at 08:10

## 2023-10-29 RX ADMIN — HYDROMORPHONE HYDROCHLORIDE 2 MG: 2 INJECTION, SOLUTION INTRAMUSCULAR; INTRAVENOUS; SUBCUTANEOUS at 05:10

## 2023-10-29 RX ADMIN — HYDROMORPHONE HYDROCHLORIDE 1.5 MG: 1 INJECTION, SOLUTION INTRAMUSCULAR; INTRAVENOUS; SUBCUTANEOUS at 11:10

## 2023-10-29 RX ADMIN — PANCRELIPASE 2 CAPSULE: 30000; 6000; 19000 CAPSULE, DELAYED RELEASE PELLETS ORAL at 08:10

## 2023-10-29 RX ADMIN — GABAPENTIN 600 MG: 300 CAPSULE ORAL at 08:10

## 2023-10-29 RX ADMIN — CEFEPIME 2 G: 2 INJECTION, POWDER, FOR SOLUTION INTRAVENOUS at 12:10

## 2023-10-29 RX ADMIN — IOHEXOL 100 ML: 350 INJECTION, SOLUTION INTRAVENOUS at 01:10

## 2023-10-29 RX ADMIN — GABAPENTIN 600 MG: 300 CAPSULE ORAL at 09:10

## 2023-10-29 RX ADMIN — GABAPENTIN 600 MG: 300 CAPSULE ORAL at 02:10

## 2023-10-29 NOTE — ASSESSMENT & PLAN NOTE
Admit with hemoglobin 10.2; Baseline ~ 10    Lab Results   Component Value Date    HGB 9.0 (L) 10/29/2023     - Daily CBC   - Transfuse hgb <7

## 2023-10-29 NOTE — ASSESSMENT & PLAN NOTE
Patient with extended course of high-potency opioids since being hospitalized multiple times for pancreatitis, starting on 9/17/23. KUB (10/27/23) notable for high-stool burden, though without focal dilatation, transition points, or free air. Patient with increased abdominal pain that is different in character and location from description of pain on admission, now including dull, achey pain in belt like distribution with intermittent nausea.     - continue current bowel regimen: zehra colace 2 tabs daily, miralax BID  - can add enemas if needed

## 2023-10-29 NOTE — PLAN OF CARE
Patient AAOX4, makes needs known. Continues with continuous pain, intermittent nausea and intermittent anxiety. Medicated as ordered. Good appetite noted today. CT scan of abdomen today. Safety measures in place. Call light in reach.        Problem: Adult Inpatient Plan of Care  Goal: Plan of Care Review  Outcome: Ongoing, Progressing  Goal: Patient-Specific Goal (Individualized)  Outcome: Ongoing, Progressing  Goal: Absence of Hospital-Acquired Illness or Injury  Outcome: Ongoing, Progressing  Goal: Optimal Comfort and Wellbeing  Outcome: Ongoing, Progressing  Goal: Readiness for Transition of Care  Outcome: Ongoing, Progressing     Problem: Nausea and Vomiting  Goal: Fluid and Electrolyte Balance  Outcome: Ongoing, Progressing     Problem: Fluid Imbalance (Pancreatitis)  Goal: Fluid Balance  Outcome: Ongoing, Progressing     Problem: Infection (Pancreatitis)  Goal: Infection Symptom Resolution  Outcome: Ongoing, Progressing     Problem: Nutrition Impaired (Pancreatitis)  Goal: Optimal Nutrition Intake  Outcome: Ongoing, Progressing     Problem: Pain (Pancreatitis)  Goal: Acceptable Pain Control  Outcome: Ongoing, Progressing     Problem: Respiratory Compromise (Pancreatitis)  Goal: Effective Oxygenation and Ventilation  Outcome: Ongoing, Progressing

## 2023-10-29 NOTE — SUBJECTIVE & OBJECTIVE
Interval History: No acute events overnight. Patient hemodynamically stable, eating well, no nausea. Still complaining of persistent 10/10 pain upper abd area, sharp. He had large bm yesterday. Otherwise doing well.     Review of Systems   Constitutional:  Negative for appetite change, chills, fatigue and fever.   HENT:  Negative for congestion and sore throat.    Respiratory:  Negative for cough and shortness of breath.    Cardiovascular:  Negative for chest pain.   Gastrointestinal:  Positive for abdominal pain and nausea. Negative for constipation, diarrhea and vomiting.   Genitourinary:  Negative for dysuria and flank pain.   Musculoskeletal:  Positive for back pain. Negative for joint swelling.   Neurological:  Negative for dizziness and headaches.   Psychiatric/Behavioral:  Negative for confusion. The patient is not nervous/anxious.      Objective:     Vital Signs (Most Recent):  Temp: 98.6 °F (37 °C) (10/29/23 1124)  Pulse: 87 (10/29/23 1124)  Resp: 18 (10/29/23 1313)  BP: 115/71 (10/29/23 1124)  SpO2: (!) 94 % (10/29/23 1124) Vital Signs (24h Range):  Temp:  [98 °F (36.7 °C)-98.6 °F (37 °C)] 98.6 °F (37 °C)  Pulse:  [70-96] 87  Resp:  [16-19] 18  SpO2:  [92 %-96 %] 94 %  BP: (110-126)/(66-82) 115/71     Weight: 68 kg (150 lb)  Body mass index is 21.52 kg/m².    Intake/Output Summary (Last 24 hours) at 10/29/2023 1502  Last data filed at 10/29/2023 1405  Gross per 24 hour   Intake 1742.57 ml   Output --   Net 1742.57 ml         Physical Exam  Constitutional:       Appearance: Normal appearance.   HENT:      Head: Normocephalic and atraumatic.      Nose: Nose normal.      Mouth/Throat:      Mouth: Mucous membranes are moist.   Eyes:      Pupils: Pupils are equal, round, and reactive to light.   Cardiovascular:      Rate and Rhythm: Normal rate.   Pulmonary:      Effort: Pulmonary effort is normal.      Breath sounds: Normal breath sounds.   Abdominal:      General: Abdomen is flat. Bowel sounds are normal.  There is no distension.      Palpations: Abdomen is soft.      Tenderness: There is abdominal tenderness (upper quadrant).   Musculoskeletal:         General: Normal range of motion.      Cervical back: Normal range of motion.   Skin:     General: Skin is warm.   Neurological:      General: No focal deficit present.      Mental Status: He is alert and oriented to person, place, and time.   Psychiatric:         Mood and Affect: Mood normal.             Significant Labs: All pertinent labs within the past 24 hours have been reviewed.    Significant Imaging: I have reviewed all pertinent imaging results/findings within the past 24 hours.

## 2023-10-29 NOTE — ASSESSMENT & PLAN NOTE
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 Lipase mildly elevated. WBC and lactate WNL. On IV Cefepime for 4wk course. Discharged on MSIR and Morphine for pain control. EUS (10/23/23) "complex collections along the neck, body, and tail of pancreas. Not suitable for endoscopic transmural stenting/drainage." Scheduled for follow up CT in 4-6 weeks. A repeat CT was obtained on 10/29 due to worsening abdominal pain despite high opioid doses, showing new fluid collection pressing on IVC, will clarify if this needs drainage with [facility]/GI.     - Patient tolerating PO now: continue creon, low fat diet  - Continue Cefepime - per ID recs on previous admission, plan to treat until 11/2 with outpt follow up scheduled for 11/9  - paincontrol - Dilaudid 1.5 mg Q4H PRN; scheduled Tylenol, gabapentin 600 mg TID          "

## 2023-10-29 NOTE — PLAN OF CARE
Problem: Adult Inpatient Plan of Care  Goal: Plan of Care Review  Outcome: Ongoing, Progressing  Goal: Patient-Specific Goal (Individualized)  Outcome: Ongoing, Progressing  Goal: Absence of Hospital-Acquired Illness or Injury  Outcome: Ongoing, Progressing  Goal: Optimal Comfort and Wellbeing  Outcome: Ongoing, Progressing  Goal: Readiness for Transition of Care  Outcome: Ongoing, Progressing     Problem: Nausea and Vomiting  Goal: Fluid and Electrolyte Balance  Outcome: Ongoing, Progressing     Problem: Fluid Imbalance (Pancreatitis)  Goal: Fluid Balance  Outcome: Ongoing, Progressing     Problem: Infection (Pancreatitis)  Goal: Infection Symptom Resolution  Outcome: Ongoing, Progressing     Problem: Nutrition Impaired (Pancreatitis)  Goal: Optimal Nutrition Intake  Outcome: Ongoing, Progressing     Problem: Pain (Pancreatitis)  Goal: Acceptable Pain Control  Outcome: Ongoing, Progressing     Problem: Respiratory Compromise (Pancreatitis)  Goal: Effective Oxygenation and Ventilation  Outcome: Ongoing, Progressing    Pt had an uneventful night. VSS. Pt completed IV fluids and given iv abx therapy. Pt given prn pain meds and iv antiemetic. POC reviewed with pt. Pt verbalized understanding.  Pt is free of falls and injuries. Bed in lowest position and call light within reach. Safety maintained .

## 2023-10-29 NOTE — PROGRESS NOTES
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 He has been in and out of the hospital for the last two weeks. His most recent discharge was on10/19 after admission for recurrent symptoms of his acute necrotizing pancreatitis. He was discharged home on MSIR and Morphine which was working well until yesterday morning (10/21) when he started having increased abdominal pain w/nausea. He attempted to eat something but vomited. He denies any recent alcohol or tobacco use. He reports eating normally since discharge. Last meal was a grilled cheese yesterday for dinner. Last BM was loose and "oily" but normal color w/out blood present. He was evaluated by AES on his 10/5 admission who recommended against any intervention on the pancreatic collection due to immaturity. He has been on IV Cefepime since 10/5 with possible end date 11/2.     In the ED, he was afebrile and tachycardic to the 130s. Labs were notable for K 3.3, Bicarb 21, BUN 5, Alb 3.0, Lipase 96. Lactate WNL. Repeat CT A/P showed small R pleural effusion but no acute intra-abdominopelvic abnormality. Peripancreatic fluid collection at the pancreatic tail stable from previous imaging. He was given Dilaudid 2mg x1, Dilaudid 1mg x2 for pain and his evening dose of Cefepime. Hospital Medicine was called for admission of       Overview/Hospital Course:  Admitted to hospital medicine. Cefepime and MS contin continued, " "with addition of scheduled tylenol + toradol and then breakthrough dilaudid. AES consulted again now that imaging findings were stable >4w since initial diagnosis and persistent symptoms. Underwent EUS with findings of stable fluid collection which was not drained ("Complex collections along the neck, body, and tail of pancreas (body/tail region likely connected). Not suitable for endoscopic transmural stenting/drainage.") He will need follow-up CT scan in 4-6 weeks. Cefepime to complete 11/2/23. Patient with increasing pain and inadequate pain control despite dilaudid 2mg q4h w/ MS Contin 15mg bid and multimodal pain medication. A repeat CT Abd/Pelvis on 10/29 showed similar prior fluid collection with new fluid along the posterior aspect of the liver causing IVC compression.           Interval History: No acute events overnight. Patient hemodynamically stable, eating well, no nausea. Still complaining of persistent 10/10 pain upper abd area, sharp. He had large bm yesterday. Otherwise doing well.     Review of Systems   Constitutional:  Negative for appetite change, chills, fatigue and fever.   HENT:  Negative for congestion and sore throat.    Respiratory:  Negative for cough and shortness of breath.    Cardiovascular:  Negative for chest pain.   Gastrointestinal:  Positive for abdominal pain and nausea. Negative for constipation, diarrhea and vomiting.   Genitourinary:  Negative for dysuria and flank pain.   Musculoskeletal:  Positive for back pain. Negative for joint swelling.   Neurological:  Negative for dizziness and headaches.   Psychiatric/Behavioral:  Negative for confusion. The patient is not nervous/anxious.      Objective:     Vital Signs (Most Recent):  Temp: 98.6 °F (37 °C) (10/29/23 1124)  Pulse: 87 (10/29/23 1124)  Resp: 18 (10/29/23 1313)  BP: 115/71 (10/29/23 1124)  SpO2: (!) 94 % (10/29/23 1124) Vital Signs (24h Range):  Temp:  [98 °F (36.7 °C)-98.6 °F (37 °C)] 98.6 °F (37 °C)  Pulse:  [70-96] " "87  Resp:  [16-19] 18  SpO2:  [92 %-96 %] 94 %  BP: (110-126)/(66-82) 115/71     Weight: 68 kg (150 lb)  Body mass index is 21.52 kg/m².    Intake/Output Summary (Last 24 hours) at 10/29/2023 1502  Last data filed at 10/29/2023 1405  Gross per 24 hour   Intake 1742.57 ml   Output --   Net 1742.57 ml         Physical Exam  Constitutional:       Appearance: Normal appearance.   HENT:      Head: Normocephalic and atraumatic.      Nose: Nose normal.      Mouth/Throat:      Mouth: Mucous membranes are moist.   Eyes:      Pupils: Pupils are equal, round, and reactive to light.   Cardiovascular:      Rate and Rhythm: Normal rate.   Pulmonary:      Effort: Pulmonary effort is normal.      Breath sounds: Normal breath sounds.   Abdominal:      General: Abdomen is flat. Bowel sounds are normal. There is no distension.      Palpations: Abdomen is soft.      Tenderness: There is abdominal tenderness (upper quadrant).   Musculoskeletal:         General: Normal range of motion.      Cervical back: Normal range of motion.   Skin:     General: Skin is warm.   Neurological:      General: No focal deficit present.      Mental Status: He is alert and oriented to person, place, and time.   Psychiatric:         Mood and Affect: Mood normal.             Significant Labs: All pertinent labs within the past 24 hours have been reviewed.    Significant Imaging: I have reviewed all pertinent imaging results/findings within the past 24 hours.      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 Lipase mildly elevated. WBC and lactate WNL. On IV Cefepime for 4wk course. Discharged on MSIR and Morphine for pain control. EUS (10/23/23) "complex collections along the neck, body, and tail of pancreas. Not " "suitable for endoscopic transmural stenting/drainage." Scheduled for follow up CT in 4-6 weeks. A repeat CT was obtained on 10/29 due to worsening abdominal pain despite high opioid doses, showing new fluid collection pressing on IVC, will clarify if this needs drainage with AES/GI.     - Patient tolerating PO now: continue creon, low fat diet  - Continue Cefepime - per ID recs on previous admission, plan to treat until 11/2 with outpt follow up scheduled for 11/9  - paincontrol - Dilaudid 1.5 mg Q4H PRN; scheduled Tylenol, gabapentin 600 mg TID            Therapeutic opioid-induced constipation (OIC)  Patient with extended course of high-potency opioids since being hospitalized multiple times for pancreatitis, starting on 9/17/23. KUB (10/27/23) notable for high-stool burden, though without focal dilatation, transition points, or free air. Patient with increased abdominal pain that is different in character and location from description of pain on admission, now including dull, achey pain in belt like distribution with intermittent nausea.     - continue current bowel regimen: zehra colace 2 tabs daily, miralax BID  - can add enemas if needed    Pancreatic pseudocyst/cyst  - Will need f/u CT scan in 4 weeks per GI  - Continue pain control, creon, low fat diet.    Hypokalemia  K 3.3 on admission; likely 2/2 to decreased PO intake    - Gave IV K 10meq Q1H for 6 doses   - Replete K PRN       Continuous severe abdominal pain  Likely 2/2 to pancreatitis; Discharged home on Morphine taper which was working well until yesterday morning    - See Chronic Pancreatitis       Alcohol use disorder  Hx of EtOH use disorder. On most recent admission, patient's ethanol negative in setting of pancreatitis flare.   - monitor for signs of withdrawal, though patient denies EtOH use in between recent admissions      Splenic vein thrombosis  Splenic vein thrombus originally seen on 9/20 imaging, and persistent on most recent CT abdominal " "imaging. Started on DOAC at last admission, loading dose completed.     - Continue eliquis 5 mg BID      Mild intermittent asthma without complication  History of asthma requiring PRN albuterol. Has not recently been using any inhaler treatment.     Normocytic anemia  Admit with hemoglobin 10.2; Baseline ~ 10    Lab Results   Component Value Date    HGB 9.0 (L) 10/29/2023     - Daily CBC   - Transfuse hgb <7       Polycythemia vera  Hgb 10.5 on admission. Follows up with hematology as outpatient.   - continue to monitor daily CBC      HIV infection  This patient in known to have HIV+ status (Have detected HIV PCR but never CD4 <200 or AIDS defining illness). Labs reviewed- No results found for: "CD4", No results found for: "HIVDNAPCR". Patient is on HAART. Will continue HAART. Continue to monitor routine labs. Last CD4 count was   Lab Results   Component Value Date    ABSOLUTECD4 1080 10/12/2023     - Continue home Biktarvy         VTE Risk Mitigation (From admission, onward)         Ordered     apixaban tablet 5 mg  2 times daily         10/24/23 0744                Discharge Planning   CASTILLO: 10/31/2023     Code Status: Full Code   Is the patient medically ready for discharge?:     Reason for patient still in hospital (select all that apply): Treatment and Imaging  Discharge Plan A: Home with family                  Anabelle Rosas DO  Department of Hospital Medicine   [facility] - [facility] ([place] [id number])    "

## 2023-10-30 PROBLEM — A41.9 SEPSIS: Status: ACTIVE | Noted: 2023-10-30

## 2023-10-30 LAB
ALBUMIN SERPL BCP-MCNC: 2.8 G/DL (ref 3.5–5.2)
ALP SERPL-CCNC: 76 U/L (ref 55–135)
ALT SERPL W/O P-5'-P-CCNC: 7 U/L (ref 10–44)
ANION GAP SERPL CALC-SCNC: 13 MMOL/L (ref 8–16)
AST SERPL-CCNC: 14 U/L (ref 10–40)
BACTERIA BLD CULT: NORMAL
BACTERIA BLD CULT: NORMAL
BASOPHILS # BLD AUTO: 0.03 K/UL (ref 0–0.2)
BASOPHILS NFR BLD: 0.4 % (ref 0–1.9)
BILIRUB SERPL-MCNC: 0.3 MG/DL (ref 0.1–1)
BUN SERPL-MCNC: 7 MG/DL (ref 6–20)
CALCIUM SERPL-MCNC: 9.1 MG/DL (ref 8.7–10.5)
CHLORIDE SERPL-SCNC: 104 MMOL/L (ref 95–110)
CO2 SERPL-SCNC: 22 MMOL/L (ref 23–29)
CREAT SERPL-MCNC: 0.6 MG/DL (ref 0.5–1.4)
DIFFERENTIAL METHOD: ABNORMAL
EOSINOPHIL # BLD AUTO: 0.8 K/UL (ref 0–0.5)
EOSINOPHIL NFR BLD: 11.2 % (ref 0–8)
ERYTHROCYTE [DISTWIDTH] IN BLOOD BY AUTOMATED COUNT: 18.1 % (ref 11.5–14.5)
EST. GFR  (NO RACE VARIABLE): >60 ML/MIN/1.73 M^2
GLUCOSE SERPL-MCNC: 95 MG/DL (ref 70–110)
HCT VFR BLD AUTO: 31.3 % (ref 40–54)
HGB BLD-MCNC: 9.4 G/DL (ref 14–18)
IMM GRANULOCYTES # BLD AUTO: 0.02 K/UL (ref 0–0.04)
IMM GRANULOCYTES NFR BLD AUTO: 0.3 % (ref 0–0.5)
LIPASE SERPL-CCNC: 349 U/L (ref 4–60)
LYMPHOCYTES # BLD AUTO: 2.4 K/UL (ref 1–4.8)
LYMPHOCYTES NFR BLD: 32 % (ref 18–48)
MAGNESIUM SERPL-MCNC: 1.8 MG/DL (ref 1.6–2.6)
MCH RBC QN AUTO: 27.1 PG (ref 27–31)
MCHC RBC AUTO-ENTMCNC: 30 G/DL (ref 32–36)
MCV RBC AUTO: 90 FL (ref 82–98)
MONOCYTES # BLD AUTO: 0.8 K/UL (ref 0.3–1)
MONOCYTES NFR BLD: 10.3 % (ref 4–15)
NEUTROPHILS # BLD AUTO: 3.4 K/UL (ref 1.8–7.7)
NEUTROPHILS NFR BLD: 45.8 % (ref 38–73)
NRBC BLD-RTO: 0 /100 WBC
PHOSPHATE SERPL-MCNC: 5 MG/DL (ref 2.7–4.5)
PLATELET # BLD AUTO: 300 K/UL (ref 150–450)
PMV BLD AUTO: 10.5 FL (ref 9.2–12.9)
POTASSIUM SERPL-SCNC: 4.2 MMOL/L (ref 3.5–5.1)
PROT SERPL-MCNC: 6.8 G/DL (ref 6–8.4)
RBC # BLD AUTO: 3.47 M/UL (ref 4.6–6.2)
SODIUM SERPL-SCNC: 139 MMOL/L (ref 136–145)
WBC # BLD AUTO: 7.4 K/UL (ref 3.9–12.7)

## 2023-10-30 PROCEDURE — 99233 SBSQ HOSP IP/OBS HIGH 50: CPT | Mod: ,,, | Performed by: HOSPITALIST

## 2023-10-30 PROCEDURE — 99233 PR SUBSEQUENT HOSPITAL CARE,LEVL III: ICD-10-PCS | Mod: ,,, | Performed by: HOSPITALIST

## 2023-10-30 PROCEDURE — 80053 COMPREHEN METABOLIC PANEL: CPT

## 2023-10-30 PROCEDURE — 25000003 PHARM REV CODE 250: Performed by: STUDENT IN AN ORGANIZED HEALTH CARE EDUCATION/TRAINING PROGRAM

## 2023-10-30 PROCEDURE — 63600175 PHARM REV CODE 636 W HCPCS

## 2023-10-30 PROCEDURE — 25000003 PHARM REV CODE 250

## 2023-10-30 PROCEDURE — 85025 COMPLETE CBC W/AUTO DIFF WBC: CPT

## 2023-10-30 PROCEDURE — 99223 1ST HOSP IP/OBS HIGH 75: CPT | Mod: ,,, | Performed by: INTERNAL MEDICINE

## 2023-10-30 PROCEDURE — 87040 BLOOD CULTURE FOR BACTERIA: CPT | Mod: 59

## 2023-10-30 PROCEDURE — 63600175 PHARM REV CODE 636 W HCPCS: Performed by: STUDENT IN AN ORGANIZED HEALTH CARE EDUCATION/TRAINING PROGRAM

## 2023-10-30 PROCEDURE — 25000003 PHARM REV CODE 250: Performed by: INTERNAL MEDICINE

## 2023-10-30 PROCEDURE — 36415 COLL VENOUS BLD VENIPUNCTURE: CPT | Performed by: STUDENT IN AN ORGANIZED HEALTH CARE EDUCATION/TRAINING PROGRAM

## 2023-10-30 PROCEDURE — 84100 ASSAY OF PHOSPHORUS: CPT

## 2023-10-30 PROCEDURE — 99223 PR INITIAL HOSPITAL CARE,LEVL III: ICD-10-PCS | Mod: ,,, | Performed by: INTERNAL MEDICINE

## 2023-10-30 PROCEDURE — 83735 ASSAY OF MAGNESIUM: CPT

## 2023-10-30 PROCEDURE — 83690 ASSAY OF LIPASE: CPT | Performed by: STUDENT IN AN ORGANIZED HEALTH CARE EDUCATION/TRAINING PROGRAM

## 2023-10-30 PROCEDURE — 12000002 HC ACUTE/MED SURGE SEMI-PRIVATE ROOM

## 2023-10-30 RX ORDER — HYDROMORPHONE HYDROCHLORIDE 2 MG/ML
2 INJECTION, SOLUTION INTRAMUSCULAR; INTRAVENOUS; SUBCUTANEOUS
Status: DISCONTINUED | OUTPATIENT
Start: 2023-10-30 | End: 2023-11-03

## 2023-10-30 RX ORDER — HYDROMORPHONE HYDROCHLORIDE 2 MG/ML
2 INJECTION, SOLUTION INTRAMUSCULAR; INTRAVENOUS; SUBCUTANEOUS
Status: DISCONTINUED | OUTPATIENT
Start: 2023-10-30 | End: 2023-10-30

## 2023-10-30 RX ORDER — FLUCONAZOLE 2 MG/ML
400 INJECTION, SOLUTION INTRAVENOUS
Status: DISCONTINUED | OUTPATIENT
Start: 2023-10-30 | End: 2023-11-01

## 2023-10-30 RX ORDER — SODIUM CHLORIDE, SODIUM LACTATE, POTASSIUM CHLORIDE, CALCIUM CHLORIDE 600; 310; 30; 20 MG/100ML; MG/100ML; MG/100ML; MG/100ML
INJECTION, SOLUTION INTRAVENOUS CONTINUOUS
Status: ACTIVE | OUTPATIENT
Start: 2023-10-30 | End: 2023-10-31

## 2023-10-30 RX ADMIN — HYDROMORPHONE HYDROCHLORIDE 2 MG: 2 INJECTION, SOLUTION INTRAMUSCULAR; INTRAVENOUS; SUBCUTANEOUS at 05:10

## 2023-10-30 RX ADMIN — HYDROMORPHONE HYDROCHLORIDE 2 MG: 2 INJECTION, SOLUTION INTRAMUSCULAR; INTRAVENOUS; SUBCUTANEOUS at 08:10

## 2023-10-30 RX ADMIN — BICTEGRAVIR SODIUM, EMTRICITABINE, AND TENOFOVIR ALAFENAMIDE FUMARATE 1 TABLET: 50; 200; 25 TABLET ORAL at 09:10

## 2023-10-30 RX ADMIN — APIXABAN 5 MG: 5 TABLET, FILM COATED ORAL at 09:10

## 2023-10-30 RX ADMIN — GABAPENTIN 600 MG: 300 CAPSULE ORAL at 01:10

## 2023-10-30 RX ADMIN — ACETAMINOPHEN 1000 MG: 500 TABLET ORAL at 06:10

## 2023-10-30 RX ADMIN — CEFEPIME 2 G: 2 INJECTION, POWDER, FOR SOLUTION INTRAVENOUS at 04:10

## 2023-10-30 RX ADMIN — SODIUM CHLORIDE, POTASSIUM CHLORIDE, SODIUM LACTATE AND CALCIUM CHLORIDE: 600; 310; 30; 20 INJECTION, SOLUTION INTRAVENOUS at 09:10

## 2023-10-30 RX ADMIN — PANCRELIPASE 2 CAPSULE: 30000; 6000; 19000 CAPSULE, DELAYED RELEASE PELLETS ORAL at 05:10

## 2023-10-30 RX ADMIN — FOLIC ACID 1 MG: 1 TABLET ORAL at 09:10

## 2023-10-30 RX ADMIN — PANTOPRAZOLE SODIUM 40 MG: 40 TABLET, DELAYED RELEASE ORAL at 09:10

## 2023-10-30 RX ADMIN — PANCRELIPASE 2 CAPSULE: 30000; 6000; 19000 CAPSULE, DELAYED RELEASE PELLETS ORAL at 12:10

## 2023-10-30 RX ADMIN — HYDROMORPHONE HYDROCHLORIDE 2 MG: 2 INJECTION, SOLUTION INTRAMUSCULAR; INTRAVENOUS; SUBCUTANEOUS at 06:10

## 2023-10-30 RX ADMIN — GABAPENTIN 600 MG: 300 CAPSULE ORAL at 09:10

## 2023-10-30 RX ADMIN — HYDROMORPHONE HYDROCHLORIDE 2 MG: 2 INJECTION, SOLUTION INTRAMUSCULAR; INTRAVENOUS; SUBCUTANEOUS at 01:10

## 2023-10-30 RX ADMIN — IBUPROFEN 600 MG: 200 TABLET, FILM COATED ORAL at 09:10

## 2023-10-30 RX ADMIN — ACETAMINOPHEN 1000 MG: 500 TABLET ORAL at 01:10

## 2023-10-30 RX ADMIN — ONDANSETRON 4 MG: 2 INJECTION INTRAMUSCULAR; INTRAVENOUS at 05:10

## 2023-10-30 RX ADMIN — HYDROMORPHONE HYDROCHLORIDE 2 MG: 2 INJECTION, SOLUTION INTRAMUSCULAR; INTRAVENOUS; SUBCUTANEOUS at 10:10

## 2023-10-30 RX ADMIN — SENNOSIDES AND DOCUSATE SODIUM 2 TABLET: 8.6; 5 TABLET ORAL at 09:10

## 2023-10-30 RX ADMIN — HYDROMORPHONE HYDROCHLORIDE 2 MG: 2 INJECTION, SOLUTION INTRAMUSCULAR; INTRAVENOUS; SUBCUTANEOUS at 02:10

## 2023-10-30 RX ADMIN — MEROPENEM 1 G: 1 INJECTION INTRAVENOUS at 09:10

## 2023-10-30 RX ADMIN — HYDROXYZINE PAMOATE 25 MG: 25 CAPSULE ORAL at 09:10

## 2023-10-30 RX ADMIN — GABAPENTIN 600 MG: 300 CAPSULE ORAL at 08:10

## 2023-10-30 RX ADMIN — MORPHINE SULFATE 15 MG: 15 TABLET, EXTENDED RELEASE ORAL at 09:10

## 2023-10-30 RX ADMIN — FLUCONAZOLE 400 MG: 2 INJECTION, SOLUTION INTRAVENOUS at 06:10

## 2023-10-30 RX ADMIN — ACETAMINOPHEN 1000 MG: 500 TABLET ORAL at 09:10

## 2023-10-30 RX ADMIN — PANTOPRAZOLE SODIUM 40 MG: 40 TABLET, DELAYED RELEASE ORAL at 08:10

## 2023-10-30 RX ADMIN — APIXABAN 5 MG: 5 TABLET, FILM COATED ORAL at 08:10

## 2023-10-30 RX ADMIN — SODIUM CHLORIDE, POTASSIUM CHLORIDE, SODIUM LACTATE AND CALCIUM CHLORIDE: 600; 310; 30; 20 INJECTION, SOLUTION INTRAVENOUS at 08:10

## 2023-10-30 RX ADMIN — Medication 6 MG: at 08:10

## 2023-10-30 RX ADMIN — MEROPENEM 1 G: 1 INJECTION INTRAVENOUS at 05:10

## 2023-10-30 RX ADMIN — HYDROMORPHONE HYDROCHLORIDE 2 MG: 2 INJECTION, SOLUTION INTRAMUSCULAR; INTRAVENOUS; SUBCUTANEOUS at 11:10

## 2023-10-30 NOTE — HPI
Tasia Cardona is a 23-year-old man with a history of HIV on HAART, asthma, polycythemia vera, and necrotizing pancreatitis, who presents with refractory abdominal pain due to necrotizing pancreatitis. Recent history includes admission to [facility] from 9/17 to 9/25, with initial concerns for hematemesis and Corinne-Chauhan tear; post-EGD he developed severe abdominal pain and CT imaging showed findings consistent with acute necrotizing pancreatitis with splenic vein thrombosis. Hematology consulted and anticoagulation not recommended for splenic vein thrombosis, he received 2 days of empiric IV antibiotic therapy with vancomycin and cefepime prior to discharge. Patient was then admitted to [facility] on 9/29 to 10/4 with continued uncontrolled abdominal pain and fevers. Repeat CT scan demonstrated sequela of necrotizing pancreatitis with encapsulated, multi-lobulated peripancreatic fluid collection. He was started on empiric meropenem until 10/2 where he was transitioned to PO Cipro and Flagyl and discharged with that regimen. He was discharged on 10/4 and then re-presented to [facility] on 10/5 due to continued severe abdominal pain and subjective fevers. Found to have uptrending leukocytosis from 11 on 10/4 to 23 on 10/5. He was started on meropenem and then transitioned to zosyn. GI consulted and deferred endoscopic drainage given recency of fluid collections and no indication of infection or obstruction. ID was consulted and initially recommended IV cefepime 2g BID for 4 week course (EOT 11/2). Patient was discharged on 10/10, and subsequently presented again on 10/12 with intractable abdominal pain, nausea, and vomiting. Repeat CT a/p on 10/12 showed grossly stable findings from CT from prior admissions. GI consulted again and recommended deferral of peripancreatic fluid drainage. Pain improved after some time and pt was discharged on 10/19. He then presented again for this current admission on 10/21 with the same symptoms as  prior. This hospitalization complicated by fever of 102.2 on 10/29. Additionally pt reports ongoing night sweats that been persistent for some time, but particularly on the 25th, so blood cultures were drawn which remain NGTD. Patient remains on IV cefepime as recommended previously by ID. ID consulted now for consideration of initiating meropenem.

## 2023-10-30 NOTE — CONSULTS
Patient w/ a hx of HIV who was admitted with necrotizing pancreatitis, found to have a multilocular fluid collection on imaging. Collection arises from the pancreatic body with dominant component in the central abdomen measuring 5.8 x 6.0 x 6.9 cm and exhibiting peripheral enhancement. This collection appears stable as compared to prior studies. Additionally, there is a component extending cranially between the posterior aspect of the liver and the right diaphragmatic sammy, exhibiting mass effect upon the IVC and new from prior study. IR consulted for aspiration of the collection exhibiting mass effect on the IVC. Imaging reviewed by IR staff Dr Nelson. No safe window for drainage of this collection - approach would have to be done transhepatic or transpleural which would increase the risk of infection. Would not recommend IR intervention at this time. Plan discussed with ordering team.     Janet Anderson PA-C  Interventional Radiology  Spectra: [id number]  10/30/2023

## 2023-10-30 NOTE — SUBJECTIVE & OBJECTIVE
Interval History: Patient with fever last evening (Tm 102), prompting broadened abx to meropenem. ID consulted this morning. IR/AES consulted for possible drainage of new collection focus involving IVC mass effect. Episode of diarrhea yesterday AM, and no further episodes since d/c'ing lactulose.     Patient with continued epigastric abdominal pain, reported as intermittent and characterized as sharp, stabbing.     Review of Systems   Constitutional:  Negative for appetite change, chills, fatigue and fever.   HENT:  Negative for congestion and sore throat.    Respiratory:  Negative for cough and shortness of breath.    Cardiovascular:  Negative for chest pain.   Gastrointestinal:  Positive for abdominal pain and nausea. Negative for constipation, diarrhea and vomiting.   Genitourinary:  Negative for dysuria and flank pain.   Musculoskeletal:  Positive for back pain. Negative for joint swelling.   Neurological:  Negative for dizziness and headaches.   Psychiatric/Behavioral:  Negative for confusion. The patient is not nervous/anxious.      Objective:     Vital Signs (Most Recent):  Temp: 98.5 °F (36.9 °C) (10/30/23 0817)  Pulse: 93 (10/30/23 0817)  Resp: 18 (10/30/23 1045)  BP: 117/76 (10/30/23 0817)  SpO2: (!) 93 % (10/30/23 0817) Vital Signs (24h Range):  Temp:  [97.9 °F (36.6 °C)-102.2 °F (39 °C)] 98.5 °F (36.9 °C)  Pulse:  [] 93  Resp:  [16-18] 18  SpO2:  [92 %-96 %] 93 %  BP: (114-130)/(72-89) 117/76     Weight: 68 kg (150 lb)  Body mass index is 21.52 kg/m².    Intake/Output Summary (Last 24 hours) at 10/30/2023 1139  Last data filed at 10/29/2023 2000  Gross per 24 hour   Intake 580 ml   Output --   Net 580 ml           Physical Exam  Constitutional:       Appearance: Normal appearance.   HENT:      Head: Normocephalic and atraumatic.      Nose: Nose normal.      Mouth/Throat:      Mouth: Mucous membranes are moist.   Eyes:      Pupils: Pupils are equal, round, and reactive to light.   Cardiovascular:       Rate and Rhythm: Normal rate.   Pulmonary:      Effort: Pulmonary effort is normal.      Breath sounds: Normal breath sounds.   Abdominal:      General: Abdomen is flat. Bowel sounds are normal. There is no distension.      Palpations: Abdomen is soft. There is no mass.      Tenderness: There is abdominal tenderness (upper quadrant). There is no right CVA tenderness, left CVA tenderness, guarding or rebound.   Musculoskeletal:         General: Normal range of motion.      Cervical back: Normal range of motion.   Skin:     General: Skin is warm.   Neurological:      General: No focal deficit present.      Mental Status: He is alert and oriented to person, place, and time.   Psychiatric:         Mood and Affect: Mood normal.             Significant Labs: All pertinent labs within the past 24 hours have been reviewed.    Significant Imaging: I have reviewed all pertinent imaging results/findings within the past 24 hours.

## 2023-10-30 NOTE — NURSING
"Notified Dr Noah Trinh that patient had "Break through only" dilaudid ordered for PRN pain only. No other PRN pain medication ordered. MD stated that was fine and also notified me that he had increased the frequency of the dilaudid. Patient with complaints of pain at 8/10 at this time. Not time for any scheduled pain medication. PRN dilaudid given.   "

## 2023-10-30 NOTE — CONSULTS
[facility] - [facility]ensive [facility] ([place] [address])  Infectious Disease  Consult Note    Patient Name: Tasia Cardona  MRN: [id number]  Admission Date: 10/21/2023  Hospital Length of Stay: 8 days  Attending Physician: Osmin Dutta MD  Primary Care Provider: Pina Jones NP     Isolation Status: No active isolations    Patient information was obtained from patient, past medical records and ER records.      Inpatient consult to Infectious Diseases  Consult performed by: Cedric Hester MD  Consult ordered by: Noah Trinh MD        Assessment/Plan:     GI  * Chronic pancreatitis  23M with history of HIV and polycythemia vera, p/w 1 month of refractory severe abdominal pain, nausea, and vomiting, with CT imaging showing peripancreatic, multi-lobulated collections of fluid. Most recent CT showing extension of fluid to the posterior liver. Pt now also with fever on 10/29 despite adherence with IV cefepime at home since discharge on 10/10, and continuation of IV cefepime during this admission. Cultures have always been negative and most recent cultures from 10/25 remain negative. Peripancreatic fluid drainage deferred by GI and IR at this time, due to small size and immaturity of fluid collections and no apparent indication for emergent drainage at present. ID consulted regarding initiation of meropenem in setting of possible treatment failure with previous antibiotic regimens. No other obvious source of infection asides from pancreas at present.    Recommendations:  - Continue Meropenem at present: would cover for anaerobes and enterococcus, and patient has not responded to ciprofloxacin or metronidazole in the past.  - Recommend adding fluconazole 400mg q24h, as yeast and other fungi can sometimes translocate in the GI tract to cause infection  - Given that patient has not improved with conservative and medical management for over 1 month, would recommend General Surgery evaluation of intractable pancreatitis as other  minimally invasive interventions have not been able to be performed.  - Ordered repeat blood cultures due to yesterday's fever, will f/u.      Infectious Disease will continue to follow this patient. Please call with any additional questions, concerns or changes in the patient's clinical status. Patient discussed and seen with Dr. Velasco, staff attestation to follow.    Cedric Hester MD  Infectious Disease  [facility] - Intensive Care ([place] [address])    Subjective:     Principal Problem: Chronic pancreatitis    HPI: Tasia Cardona is a 23-year-old man with a history of HIV on HAART, asthma, polycythemia vera, and necrotizing pancreatitis, who presents with refractory abdominal pain due to necrotizing pancreatitis. Recent history includes admission to [facility] from 9/17 to 9/25, with initial concerns for hematemesis and Corinne-Chauhan tear; post-EGD he developed severe abdominal pain and CT imaging showed findings consistent with acute necrotizing pancreatitis with splenic vein thrombosis. Hematology consulted and anticoagulation not recommended for splenic vein thrombosis, he received 2 days of empiric IV antibiotic therapy with vancomycin and cefepime prior to discharge. Patient was then admitted to [facility] on 9/29 to 10/4 with continued uncontrolled abdominal pain and fevers. Repeat CT scan demonstrated sequela of necrotizing pancreatitis with encapsulated, multi-lobulated peripancreatic fluid collection. He was started on empiric meropenem until 10/2 where he was transitioned to PO Cipro and Flagyl and discharged with that regimen. He was discharged on 10/4 and then re-presented to [facility] on 10/5 due to continued severe abdominal pain and subjective fevers. Found to have uptrending leukocytosis from 11 on 10/4 to 23 on 10/5. He was started on meropenem and then transitioned to zosyn. GI consulted and deferred endoscopic drainage given recency of fluid collections and no indication of infection or obstruction. ID was consulted and  initially recommended IV cefepime 2g BID for 4 week course (EOT 11/2). Patient was discharged on 10/10, and subsequently presented again on 10/12 with intractable abdominal pain, nausea, and vomiting. Repeat CT a/p on 10/12 showed grossly stable findings from CT from prior admissions. GI consulted again and recommended deferral of peripancreatic fluid drainage. Pain improved after some time and pt was discharged on 10/19. He then presented again for this current admission on 10/21 with the same symptoms as prior. This hospitalization complicated by fever of 102.2 on 10/29. Additionally pt reports ongoing night sweats that been persistent for some time, but particularly on the 25th, so blood cultures were drawn which remain NGTD. Patient remains on IV cefepime as recommended previously by ID. ID consulted now for consideration of initiating meropenem.      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       Past Surgical History:   Procedure Laterality Date    ENDOSCOPIC ULTRASOUND OF UPPER GASTROINTESTINAL TRACT N/A 10/23/2023    Procedure: ULTRASOUND, UPPER GI TRACT, ENDOSCOPIC;  Surgeon: Emil Villatoro MD;  Location: [address]);  Service: Endoscopy;  Laterality: N/A;    ERCP N/A 10/23/2023    Procedure: ERCP (ENDOSCOPIC RETROGRADE CHOLANGIOPANCREATOGRAPHY);  Surgeon: Emil Villatoro MD;  Location: [address]);  Service: Endoscopy;  Laterality: N/A;    ESOPHAGOGASTRODUODENOSCOPY N/A 9/18/2023    Procedure: EGD (ESOPHAGOGASTRODUODENOSCOPY);  Surgeon: Black  Kg SARAH MD;  Location: [facility] ([address] [place] [address]);  Service: Endoscopy;  Laterality: N/A;       Review of patient's allergies indicates:   Allergen Reactions    Endeavor Swelling    Pcn [penicillins] Hives     Tolerates cephalosporins    Pecan nut Swelling    Sulfa (sulfonamide antibiotics) Hives       Medications:  Medications Prior to Admission   Medication Sig    apixaban (ELIQUIS) 5 mg Tab Take 1 tablet (5 mg total) by mouth 2 (two) times daily.    mpufsljpq-pxnoshea-tewvswk ala (BIKTARVY) -25 mg (25 kg or greater) Take 1 tablet by mouth once daily.    dextrose 5 % in water (D5W) PgBk 100 mL with ceFEPIme 2 gram SolR 2 g Inject 2 g into the vein every 8 (eight) hours.    morphine (MS CONTIN) 15 MG 12 hr tablet Take 1 tablet (15 mg total) by mouth 2 (two) times daily for 14 days, THEN 1 tablet (15 mg total) once daily for 14 days.    morphine (MSIR) 15 MG tablet Take 1 tablet (15 mg total) by mouth every 6 (six) hours as needed for Pain (severe, breakthrough).    promethazine (PHENERGAN) 25 MG tablet Take 25 mg by mouth every 4 (four) hours as needed for Nausea.     Antibiotics (From admission, onward)      Start     Stop Route Frequency Ordered    10/30/23 1000  meropenem (MERREM) 1 g in sodium chloride 0.9 % 100 mL IVPB (MB+)         -- IV Every 8 hours (non-standard times) 10/30/23 0852    10/22/23 2100  mupirocin 2 % ointment         10/27/23 2059 Nasl 2 times daily 10/22/23 1455          Antifungals (From admission, onward)      None          Antivirals (From admission, onward)          Stop Route Frequency     zvngoqexe-hzlbxptd-dnsyhpv ala         -- Oral Daily             Immunization History   Administered Date(s) Administered    COVID-19, MRNA, LN-S, PF (MODERNA FULL 0.5 ML DOSE) 10/08/2021    Hepatitis B (recombinant) Adjuvanted, 2 dose 06/16/2022, 09/20/2022    Pneumococcal Conjugate - 20 Valent 06/16/2022    Tdap 08/17/2019    meningococcal Conjugate (MCV4O) 06/16/2022       Family  History       Problem Relation (Age of Onset)    Breast cancer Maternal Grandmother    Cancer Mother, Brother    No Known Problems Father    Ovarian cancer Mother    Pancreatitis Mother          Social History     Socioeconomic History    Marital status: Single   Tobacco Use    Smoking status: Former     Types: Cigarettes    Smokeless tobacco: Never   Substance and Sexual Activity    Alcohol use: Not Currently    Drug use: Never     Social Determinants of Health     Financial Resource Strain: Low Risk  (10/23/2023)    Overall Financial Resource Strain (CARDIA)     Difficulty of Paying Living Expenses: Not very hard   Food Insecurity: No Food Insecurity (10/23/2023)    Hunger Vital Sign     Worried About Running Out of Food in the Last Year: Never true     Ran Out of Food in the Last Year: Never true   Transportation Needs: No Transportation Needs (10/23/2023)    PRAPARE - Transportation     Lack of Transportation (Medical): No     Lack of Transportation (Non-Medical): No   Physical Activity: Inactive (9/18/2023)    Exercise Vital Sign     Days of Exercise per Week: 0 days     Minutes of Exercise per Session: 0 min   Stress: Stress Concern Present (9/18/2023)    [employer] - Occupational Stress Questionnaire     Feeling of Stress : To some extent   Social Connections: Socially Isolated (10/23/2023)    Social Connection and Isolation Panel [NHANES]     Frequency of Communication with Friends and Family: Three times a week     Frequency of Social Gatherings with Friends and Family: Three times a week     Attends Congregational Services: Never     Active Member of Clubs or Organizations: No     Attends Club or Organization Meetings: Never     Marital Status: Never    Housing Stability: Low Risk  (10/23/2023)    Housing Stability Vital Sign     Unable to Pay for Housing in the Last Year: No     Number of Places Lived in the Last Year: 1     Unstable Housing in the  Last Year: No     Review of Systems   Constitutional:  Positive for diaphoresis and fever. Negative for chills.   HENT:  Negative for congestion and sore throat.    Eyes:  Negative for photophobia and visual disturbance.   Respiratory:  Negative for cough and shortness of breath.    Cardiovascular:  Negative for chest pain and palpitations.   Gastrointestinal:  Positive for abdominal pain and nausea.   Genitourinary:  Negative for dysuria and hematuria.   Musculoskeletal:  Negative for arthralgias and back pain.   Skin:  Negative for rash and wound.   Neurological:  Negative for dizziness and syncope.   Psychiatric/Behavioral:  Negative for agitation and behavioral problems.      Objective:     Vital Signs (Most Recent):  Temp: 98.5 °F (36.9 °C) (10/30/23 0817)  Pulse: 93 (10/30/23 0817)  Resp: 18 (10/30/23 1045)  BP: 117/76 (10/30/23 0817)  SpO2: (!) 93 % (10/30/23 0817) Vital Signs (24h Range):  Temp:  [97.9 °F (36.6 °C)-102.2 °F (39 °C)] 98.5 °F (36.9 °C)  Pulse:  [] 93  Resp:  [16-18] 18  SpO2:  [92 %-96 %] 93 %  BP: (114-130)/(71-89) 117/76     Weight: 68 kg (150 lb)  Body mass index is 21.52 kg/m².    Estimated Creatinine Clearance: 182.6 mL/min (based on SCr of 0.6 mg/dL).     Physical Exam  Constitutional:       Appearance: Normal appearance.   HENT:      Head: Normocephalic and atraumatic.      Nose: Nose normal.      Mouth/Throat:      Mouth: Mucous membranes are moist.   Eyes:      Pupils: Pupils are equal, round, and reactive to light.   Cardiovascular:      Rate and Rhythm: Tachycardia present.   Pulmonary:      Effort: Pulmonary effort is normal.      Breath sounds: Normal breath sounds.   Abdominal:      General: Abdomen is flat. Bowel sounds are normal. There is no distension.      Palpations: Abdomen is soft.      Tenderness: There is abdominal tenderness (epigastric).   Musculoskeletal:         General: Normal range of motion.      Cervical back: Normal range of motion.   Skin:     General:  Skin is warm.   Neurological:      General: No focal deficit present.      Mental Status: He is alert and oriented to person, place, and time.   Psychiatric:         Mood and Affect: Mood normal.          Significant Labs: CBC:   Recent Labs   Lab 10/29/23  0541 10/30/23  0228   WBC 6.44 7.40   HGB 9.0* 9.4*   HCT 29.5* 31.3*    300     CMP:   Recent Labs   Lab 10/29/23  0541 10/30/23  0228    139   K 4.1 4.2    104   CO2 20* 22*    95   BUN 7 7   CREATININE 0.7 0.6   CALCIUM 9.1 9.1   PROT 6.5 6.8   ALBUMIN 2.7* 2.8*   BILITOT 0.3 0.3   ALKPHOS 75 76   AST 15 14   ALT 9* 7*   ANIONGAP 12 13       Significant Imaging: I have reviewed all pertinent imaging results/findings within the past 24 hours.

## 2023-10-30 NOTE — PROGRESS NOTES
10/29/23 1916   Vital Signs   Temp (!) 102.2 °F (39 °C)   Temp Source Oral   Pulse 110   Heart Rate Source Monitor   Resp 17   SpO2 (!) 92 %   /89   MAP (mmHg) 104   BP Location Left arm   Patient Position Lying       Pt febrile and c/o of chills. IM4 notified because pt has no prn order for tyneol but he is on scheduled tyneol . IM4 explained it was ok to given 2200 dose early. Nurse will reassess temp in a hour

## 2023-10-30 NOTE — PROGRESS NOTES
"[facility] - [facility] ([place] [address])  [facility]  Progress Note    Patient Name: Tasia Cardona  MRN: [id number]  Patient Class: IP- Inpatient   Admission Date: 10/21/2023  Length of Stay: 8 days  Attending Physician: Osmin Dutta MD  Primary Care Provider: Pina Jones NP        Subjective:     Principal Problem:Chronic pancreatitis        HPI:  22yo M w/PMHx of HIV (on Biktarvy, last CD4 296 from 9/20/23), EtOH use, acute necrotizing pancreatitis, splenic vein thrombosis (on Eliquis), asthma, and anemia who presents to ED for persistent abdominal pain and vomiting. He has been in and out of the hospital for the last two weeks. His most recent discharge was on10/19 after admission for recurrent symptoms of his acute necrotizing pancreatitis. He was discharged home on MSIR and Morphine which was working well until yesterday morning (10/21) when he started having increased abdominal pain w/nausea. He attempted to eat something but vomited. He denies any recent alcohol or tobacco use. He reports eating normally since discharge. Last meal was a grilled cheese yesterday for dinner. Last BM was loose and "oily" but normal color w/out blood present. He was evaluated by AES on his 10/5 admission who recommended against any intervention on the pancreatic collection due to immaturity. He has been on IV Cefepime since 10/5 with possible end date 11/2.     In the ED, he was afebrile and tachycardic to the 130s. Labs were notable for K 3.3, Bicarb 21, BUN 5, Alb 3.0, Lipase 96. Lactate WNL. Repeat CT A/P showed small R pleural effusion but no acute intra-abdominopelvic abnormality. Peripancreatic fluid collection at the pancreatic tail stable from previous imaging. He was given Dilaudid 2mg x1, Dilaudid 1mg x2 for pain and his evening dose of Cefepime. Hospital Medicine was called for admission of       Overview/Hospital Course:  Admitted to hospital medicine. Cefepime and MS contin continued, with " "addition of scheduled tylenol + toradol and then breakthrough dilaudid. AES consulted again now that imaging findings were stable >4w since initial diagnosis and persistent symptoms. Underwent EUS with findings of stable fluid collection which was not drained ("Complex collections along the neck, body, and tail of pancreas (body/tail region likely connected). Not suitable for endoscopic transmural stenting/drainage.") He will need follow-up CT scan in 4-6 weeks. Cefepime to complete 11/2/23. Patient with increasing pain and inadequate pain control despite dilaudid 2mg q4h w/ MS Contin 15mg bid and multimodal pain medication. A repeat CT Abd/Pelvis on 10/29 showed similar prior fluid collection with new fluid along the posterior aspect of the liver causing IVC compression. Patients case discussed with both IR and AES, both of whom do not feel he is a good candidate for drainage, either percutaneously/transhepatic/transpleural or endoscopically. ID consulted and antibiotics broadened to Meropenem on 10/30 after fevers on evening of 10/29.           Interval History: Patient with fever last evening (Tm 102), prompting broadened abx to meropenem. ID consulted this morning. IR/AES consulted for possible drainage of new collection focus involving IVC mass effect. Episode of diarrhea yesterday AM, and no further episodes since d/c'ing lactulose.     Patient with continued epigastric abdominal pain, reported as intermittent and characterized as sharp, stabbing.     Review of Systems   Constitutional:  Negative for appetite change, chills, fatigue and fever.   HENT:  Negative for congestion and sore throat.    Respiratory:  Negative for cough and shortness of breath.    Cardiovascular:  Negative for chest pain.   Gastrointestinal:  Positive for abdominal pain and nausea. Negative for constipation, diarrhea and vomiting.   Genitourinary:  Negative for dysuria and flank pain.   Musculoskeletal:  Positive for back pain. " Negative for joint swelling.   Neurological:  Negative for dizziness and headaches.   Psychiatric/Behavioral:  Negative for confusion. The patient is not nervous/anxious.      Objective:     Vital Signs (Most Recent):  Temp: 98.5 °F (36.9 °C) (10/30/23 0817)  Pulse: 93 (10/30/23 0817)  Resp: 18 (10/30/23 1045)  BP: 117/76 (10/30/23 0817)  SpO2: (!) 93 % (10/30/23 0817) Vital Signs (24h Range):  Temp:  [97.9 °F (36.6 °C)-102.2 °F (39 °C)] 98.5 °F (36.9 °C)  Pulse:  [] 93  Resp:  [16-18] 18  SpO2:  [92 %-96 %] 93 %  BP: (114-130)/(72-89) 117/76     Weight: 68 kg (150 lb)  Body mass index is 21.52 kg/m².    Intake/Output Summary (Last 24 hours) at 10/30/2023 1139  Last data filed at 10/29/2023 2000  Gross per 24 hour   Intake 580 ml   Output --   Net 580 ml           Physical Exam  Constitutional:       Appearance: Normal appearance.   HENT:      Head: Normocephalic and atraumatic.      Nose: Nose normal.      Mouth/Throat:      Mouth: Mucous membranes are moist.   Eyes:      Pupils: Pupils are equal, round, and reactive to light.   Cardiovascular:      Rate and Rhythm: Normal rate.   Pulmonary:      Effort: Pulmonary effort is normal.      Breath sounds: Normal breath sounds.   Abdominal:      General: Abdomen is flat. Bowel sounds are normal. There is no distension.      Palpations: Abdomen is soft. There is no mass.      Tenderness: There is abdominal tenderness (upper quadrant). There is no right CVA tenderness, left CVA tenderness, guarding or rebound.   Musculoskeletal:         General: Normal range of motion.      Cervical back: Normal range of motion.   Skin:     General: Skin is warm.   Neurological:      General: No focal deficit present.      Mental Status: He is alert and oriented to person, place, and time.   Psychiatric:         Mood and Affect: Mood normal.             Significant Labs: All pertinent labs within the past 24 hours have been reviewed.    Significant Imaging: I have reviewed all  "pertinent imaging results/findings within the past 24 hours.      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 Lipase mildly elevated. WBC and lactate WNL. On IV Cefepime for 4wk course. Discharged on MSIR and Morphine for pain control. EUS (10/23/23) "complex collections along the neck, body, and tail of pancreas. Not suitable for endoscopic transmural stenting/drainage." Scheduled for follow up CT in 4-6 weeks. A repeat CT was obtained on 10/29 due to worsening abdominal pain despite high opioid doses, showing new fluid collection pressing on IVC. Discussed with AES, who will not be able to drain new collection endoscopically, and IR, who does not have a good sampling window and is reluctant to use transhepatic/transpleural methods 2/2 creating infectious tract.     - CLD diet and IVF today; continue creon  - Hold Cefepime for today - per ID recs on previous admission, plan to treat until 11/2 with outpt follow up scheduled for 11/9  - Start Meropenem for worsening lipase, progression of collection, and fever last evening.  - Consult ID   - paincontrol - Dilaudid 2 mg Q3H PRN; scheduled Tylenol, gabapentin 600 mg TID            Therapeutic opioid-induced constipation (OIC)  Patient with extended course of high-potency opioids since being hospitalized multiple times for pancreatitis, starting on 9/17/23. KUB (10/27/23) notable for high-stool burden, though without focal dilatation, transition points, or free air. Patient with increased abdominal pain that is different in character and location from description of pain on admission, now including dull, achey pain in belt like distribution with intermittent nausea.     - continue current bowel regimen: zehra colace 2 tabs daily, " "miralax BID  - can add enemas if needed    Pancreatic pseudocyst/cyst  - Will need f/u CT scan in 4 weeks per GI  - Continue pain control, creon, low fat diet.    Hypokalemia  K 3.3 on admission; likely 2/2 to decreased PO intake    - Gave IV K 10meq Q1H for 6 doses   - Replete K PRN       Continuous severe abdominal pain  Likely 2/2 to pancreatitis; Discharged home on Morphine taper which was working well until yesterday morning    - See Chronic Pancreatitis       Alcohol use disorder  Hx of EtOH use disorder. On most recent admission, patient's ethanol negative in setting of pancreatitis flare.   - monitor for signs of withdrawal, though patient denies EtOH use in between recent admissions      Splenic vein thrombosis  Splenic vein thrombus originally seen on 9/20 imaging, and persistent on most recent CT abdominal imaging. Started on DOAC at last admission, loading dose completed.     - Continue eliquis 5 mg BID      Mild intermittent asthma without complication  History of asthma requiring PRN albuterol. Has not recently been using any inhaler treatment.     Normocytic anemia  Admit with hemoglobin 10.2; Baseline ~ 10    Lab Results   Component Value Date    HGB 9.0 (L) 10/29/2023     - Daily CBC   - Transfuse hgb <7       Polycythemia vera  Hgb 10.5 on admission. Follows up with hematology as outpatient.   - continue to monitor daily CBC      HIV infection  This patient in known to have HIV+ status (Have detected HIV PCR but never CD4 <200 or AIDS defining illness). Labs reviewed- No results found for: "CD4", No results found for: "HIVDNAPCR". Patient is on HAART. Will continue HAART. Continue to monitor routine labs. Last CD4 count was   Lab Results   Component Value Date    ABSOLUTECD4 1080 10/12/2023     - Continue home Biktarvy         VTE Risk Mitigation (From admission, onward)         Ordered     apixaban tablet 5 mg  2 times daily         10/24/23 0744                Discharge Planning   CASTILLO: 11/3/2023 "     Code Status: Full Code   Is the patient medically ready for discharge?:     Reason for patient still in hospital (select all that apply): Treatment  Discharge Plan A: Home with family                  Noah Trinh MD  Department of Hospital Medicine   [facility] - Intensive Care ([address]

## 2023-10-30 NOTE — ASSESSMENT & PLAN NOTE
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 Lipase mildly elevated. WBC and lactate WNL. On IV Cefepime for 4wk course. Discharged on MSIR and Morphine for pain control. EUS (10/23/23) "complex collections along the neck, body, and tail of pancreas. Not suitable for endoscopic transmural stenting/drainage." Scheduled for follow up CT in 4-6 weeks. A repeat CT was obtained on 10/29 due to worsening abdominal pain despite high opioid doses, showing new fluid collection pressing on IVC. Discussed with AES, who will not be able to drain new collection endoscopically, and IR, who does not have a good sampling window and is reluctant to use transhepatic/transpleural methods 2/2 creating infectious tract.     - CLD diet today; continue creon  - Hold Cefepime for today - per ID recs on previous admission, plan to treat until 11/2 with outpt follow up scheduled for 11/9  - Start Meropenem for worsening lipase, progression of collection, and fever last evening.  - Consult ID   - paincontrol - Dilaudid 2 mg Q3H PRN; scheduled Tylenol, gabapentin 600 mg TID          "

## 2023-10-30 NOTE — PLAN OF CARE
Problem: Adult Inpatient Plan of Care  Goal: Plan of Care Review  Outcome: Ongoing, Progressing  Goal: Patient-Specific Goal (Individualized)  Outcome: Ongoing, Progressing  Goal: Absence of Hospital-Acquired Illness or Injury  Outcome: Ongoing, Progressing  Goal: Optimal Comfort and Wellbeing  Outcome: Ongoing, Progressing  Goal: Readiness for Transition of Care  Outcome: Ongoing, Progressing     Problem: Adult Inpatient Plan of Care  Goal: Plan of Care Review  Outcome: Ongoing, Progressing  Goal: Patient-Specific Goal (Individualized)  Outcome: Ongoing, Progressing  Goal: Absence of Hospital-Acquired Illness or Injury  Outcome: Ongoing, Progressing  Goal: Optimal Comfort and Wellbeing  Outcome: Ongoing, Progressing  Goal: Readiness for Transition of Care  Outcome: Ongoing, Progressing     Problem: Adult Inpatient Plan of Care  Goal: Plan of Care Review  Outcome: Ongoing, Progressing  Goal: Patient-Specific Goal (Individualized)  Outcome: Ongoing, Progressing  Goal: Absence of Hospital-Acquired Illness or Injury  Outcome: Ongoing, Progressing  Goal: Optimal Comfort and Wellbeing  Outcome: Ongoing, Progressing  Goal: Readiness for Transition of Care  Outcome: Ongoing, Progressing     Problem: Adult Inpatient Plan of Care  Goal: Plan of Care Review  Outcome: Ongoing, Progressing  Goal: Patient-Specific Goal (Individualized)  Outcome: Ongoing, Progressing  Goal: Absence of Hospital-Acquired Illness or Injury  Outcome: Ongoing, Progressing  Goal: Optimal Comfort and Wellbeing  Outcome: Ongoing, Progressing  Goal: Readiness for Transition of Care  Outcome: Ongoing, Progressing     Problem: Adult Inpatient Plan of Care  Goal: Plan of Care Review  Outcome: Ongoing, Progressing  Goal: Patient-Specific Goal (Individualized)  Outcome: Ongoing, Progressing  Goal: Absence of Hospital-Acquired Illness or Injury  Outcome: Ongoing, Progressing  Goal: Optimal Comfort and Wellbeing  Outcome: Ongoing, Progressing  Goal: Readiness  for Transition of Care  Outcome: Ongoing, Progressing     Problem: Adult Inpatient Plan of Care  Goal: Plan of Care Review  Outcome: Ongoing, Progressing  Goal: Patient-Specific Goal (Individualized)  Outcome: Ongoing, Progressing  Goal: Absence of Hospital-Acquired Illness or Injury  Outcome: Ongoing, Progressing  Goal: Optimal Comfort and Wellbeing  Outcome: Ongoing, Progressing  Goal: Readiness for Transition of Care  Outcome: Ongoing, Progressing       SBAR reveiwed with pt . Pt verbalize understanding . Pt febriles this shift. Pt given tylenol ibruophen and effevtive . Pt has been npo since md for a standy by surgery. Bed in lowest position and call light within reach Safety maintained

## 2023-10-30 NOTE — SUBJECTIVE & OBJECTIVE
Past Medical History:   Diagnosis Date    Acute necrotizing pancreatitis 09/20/2023    Alcohol use disorder 10/05/2023    Asthma     Hepatitis C antibody positive in blood 09/18/2023 9/2023 PCR negative    HIV infection 10/2021    Corinne-Chahuan tear 09/18/2023    Normocytic anemia 09/18/2023    Pancreatic pseudocyst/cyst 10/24/2023    Polycythemia vera 11/28/2021    Receives phlebotomy if Hct>45    Positive CMV IgG serology 09/21/2023    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       Past Surgical History:   Procedure Laterality Date    ENDOSCOPIC ULTRASOUND OF UPPER GASTROINTESTINAL TRACT N/A 10/23/2023    Procedure: ULTRASOUND, UPPER GI TRACT, ENDOSCOPIC;  Surgeon: Emil Villatoro MD;  Location: [facility] (2ND FLR);  Service: Endoscopy;  Laterality: N/A;    ERCP N/A 10/23/2023    Procedure: ERCP (ENDOSCOPIC RETROGRADE CHOLANGIOPANCREATOGRAPHY);  Surgeon: Emil Villatoro MD;  Location: [facility] (2ND FLR);  Service: Endoscopy;  Laterality: N/A;    ESOPHAGOGASTRODUODENOSCOPY N/A 9/18/2023    Procedure: EGD (ESOPHAGOGASTRODUODENOSCOPY);  Surgeon: Kg Cleaning MD;  Location: [facility] (2ND FLR);  Service: Endoscopy;  Laterality: N/A;       Review of patient's allergies indicates:   Allergen Reactions    [place] Swelling    Pcn [penicillins] Hives     Tolerates cephalosporins    Pecan nut Swelling    Sulfa (sulfonamide antibiotics) Hives       Medications:  Medications Prior to Admission   Medication Sig    apixaban (ELIQUIS) 5 mg Tab Take 1 tablet (5 mg total) by mouth 2 (two) times daily.    klsuccizm-grywappx-vhziehz ala (BIKTARVY) -25 mg (25 kg or greater) Take 1 tablet by mouth once daily.    dextrose 5 % in water (D5W) PgBk 100 mL with ceFEPIme 2 gram SolR 2 g Inject 2 g into the vein every 8 (eight) hours.    morphine (MS CONTIN) 15 MG 12 hr tablet Take 1 tablet (15 mg total) by mouth 2 (two) times daily for 14 days, THEN 1 tablet (15 mg total) once daily for 14 days.    morphine (MSIR) 15 MG tablet Take 1  tablet (15 mg total) by mouth every 6 (six) hours as needed for Pain (severe, breakthrough).    promethazine (PHENERGAN) 25 MG tablet Take 25 mg by mouth every 4 (four) hours as needed for Nausea.     Antibiotics (From admission, onward)      Start     Stop Route Frequency Ordered    10/30/23 1000  meropenem (MERREM) 1 g in sodium chloride 0.9 % 100 mL IVPB (MB+)         -- IV Every 8 hours (non-standard times) 10/30/23 0852    10/22/23 2100  mupirocin 2 % ointment         10/27/23 2059 Nasl 2 times daily 10/22/23 1455          Antifungals (From admission, onward)      None          Antivirals (From admission, onward)          Stop Route Frequency     mwxhdodqk-refoogdo-iaxhwzc ala         -- Oral Daily             Immunization History   Administered Date(s) Administered    COVID-19, MRNA, LN-S, PF (MODERNA FULL 0.5 ML DOSE) 10/08/2021    Hepatitis B (recombinant) Adjuvanted, 2 dose 06/16/2022, 09/20/2022    Pneumococcal Conjugate - 20 Valent 06/16/2022    Tdap 08/17/2019    meningococcal Conjugate (MCV4O) 06/16/2022       Family History       Problem Relation (Age of Onset)    Breast cancer Maternal Grandmother    Cancer Mother, Brother    No Known Problems Father    Ovarian cancer Mother    Pancreatitis Mother          Social History     Socioeconomic History    Marital status: Single   Tobacco Use    Smoking status: Former     Types: Cigarettes    Smokeless tobacco: Never   Substance and Sexual Activity    Alcohol use: Not Currently    Drug use: Never     Social Determinants of Health     Financial Resource Strain: Low Risk  (10/23/2023)    Overall Financial Resource Strain (CARDIA)     Difficulty of Paying Living Expenses: Not very hard   Food Insecurity: No Food Insecurity (10/23/2023)    Hunger Vital Sign     Worried About Running Out of Food in the Last Year: Never true     Ran Out of Food in the Last Year: Never true   Transportation Needs: No Transportation Needs (10/23/2023)    PRAPARE - Transportation      Lack of Transportation (Medical): No     Lack of Transportation (Non-Medical): No   Physical Activity: Inactive (9/18/2023)    Exercise Vital Sign     Days of Exercise per Week: 0 days     Minutes of Exercise per Session: 0 min   Stress: Stress Concern Present (9/18/2023)    [place] [employer] - Occupational Stress Questionnaire     Feeling of Stress : To some extent   Social Connections: Socially Isolated (10/23/2023)    Social Connection and Isolation Panel [NHANES]     Frequency of Communication with Friends and Family: Three times a week     Frequency of Social Gatherings with Friends and Family: Three times a week     Attends [employer] Services: Never     Active Member of Clubs or Organizations: No     Attends Club or Organization Meetings: Never     Marital Status: Never    Housing Stability: Low Risk  (10/23/2023)    Housing Stability Vital Sign     Unable to Pay for Housing in the Last Year: No     Number of Places Lived in the Last Year: 1     Unstable Housing in the Last Year: No     Review of Systems   Constitutional:  Positive for diaphoresis and fever. Negative for chills.   HENT:  Negative for congestion and sore throat.    Eyes:  Negative for photophobia and visual disturbance.   Respiratory:  Negative for cough and shortness of breath.    Cardiovascular:  Negative for chest pain and palpitations.   Gastrointestinal:  Positive for abdominal pain and nausea.   Genitourinary:  Negative for dysuria and hematuria.   Musculoskeletal:  Negative for arthralgias and back pain.   Skin:  Negative for rash and wound.   Neurological:  Negative for dizziness and syncope.   Psychiatric/Behavioral:  Negative for agitation and behavioral problems.      Objective:     Vital Signs (Most Recent):  Temp: 98.5 °F (36.9 °C) (10/30/23 0817)  Pulse: 93 (10/30/23 0817)  Resp: 18 (10/30/23 1045)  BP: 117/76 (10/30/23 0817)  SpO2: (!) 93 % (10/30/23 0817) Vital Signs (24h Range):  Temp:  [97.9 °F  (36.6 °C)-102.2 °F (39 °C)] 98.5 °F (36.9 °C)  Pulse:  [] 93  Resp:  [16-18] 18  SpO2:  [92 %-96 %] 93 %  BP: (114-130)/(71-89) 117/76     Weight: 68 kg (150 lb)  Body mass index is 21.52 kg/m².    Estimated Creatinine Clearance: 182.6 mL/min (based on SCr of 0.6 mg/dL).     Physical Exam  Constitutional:       Appearance: Normal appearance.   HENT:      Head: Normocephalic and atraumatic.      Nose: Nose normal.      Mouth/Throat:      Mouth: Mucous membranes are moist.   Eyes:      Pupils: Pupils are equal, round, and reactive to light.   Cardiovascular:      Rate and Rhythm: Tachycardia present.   Pulmonary:      Effort: Pulmonary effort is normal.      Breath sounds: Normal breath sounds.   Abdominal:      General: Abdomen is flat. Bowel sounds are normal. There is no distension.      Palpations: Abdomen is soft.      Tenderness: There is abdominal tenderness (epigastric).   Musculoskeletal:         General: Normal range of motion.      Cervical back: Normal range of motion.   Skin:     General: Skin is warm.   Neurological:      General: No focal deficit present.      Mental Status: He is alert and oriented to person, place, and time.   Psychiatric:         Mood and Affect: Mood normal.          Significant Labs: CBC:   Recent Labs   Lab 10/29/23  0541 10/30/23  0228   WBC 6.44 7.40   HGB 9.0* 9.4*   HCT 29.5* 31.3*    300     CMP:   Recent Labs   Lab 10/29/23  0541 10/30/23  0228    139   K 4.1 4.2    104   CO2 20* 22*    95   BUN 7 7   CREATININE 0.7 0.6   CALCIUM 9.1 9.1   PROT 6.5 6.8   ALBUMIN 2.7* 2.8*   BILITOT 0.3 0.3   ALKPHOS 75 76   AST 15 14   ALT 9* 7*   ANIONGAP 12 13       Significant Imaging: I have reviewed all pertinent imaging results/findings within the past 24 hours.

## 2023-10-30 NOTE — ASSESSMENT & PLAN NOTE
23M with history of HIV and polycythemia vera, p/w 1 month of refractory severe abdominal pain, nausea, and vomiting, with CT imaging showing peripancreatic, multi-lobulated collections of fluid. Most recent CT showing extension of fluid to the posterior liver. Pt now also with fever on 10/29 despite adherence with IV cefepime at home since discharge on 10/10, and continuation of IV cefepime during this admission. Cultures have always been negative and most recent cultures from 10/25 remain negative. Peripancreatic fluid drainage deferred by GI and IR at this time, due to small size and immaturity of fluid collections and no apparent indication for emergent drainage at present. ID consulted regarding initiation of meropenem in setting of possible treatment failure with previous antibiotic regimens. No other obvious source of infection asides from pancreas at present.    Recommendations:  - Continue Meropenem at present: would cover for anaerobes and enterococcus, and patient has not responded to ciprofloxacin or metronidazole in the past.  - Recommend adding fluconazole 400mg q24h, as yeast and other fungi can sometimes translocate in the GI tract to cause infection  - Given that patient has not improved with conservative and medical management for over 1 month, would recommend General Surgery evaluation of intractable pancreatitis as other minimally invasive interventions have not been able to be performed.  - Ordered repeat blood cultures due to yesterday's fever, will f/u.

## 2023-10-31 PROBLEM — K85.91 NECROTIZING PANCREATITIS: Status: ACTIVE | Noted: 2023-10-31

## 2023-10-31 PROBLEM — E44.0 MODERATE MALNUTRITION: Status: ACTIVE | Noted: 2023-10-31

## 2023-10-31 LAB
ALBUMIN SERPL BCP-MCNC: 2.6 G/DL (ref 3.5–5.2)
ALP SERPL-CCNC: 72 U/L (ref 55–135)
ALT SERPL W/O P-5'-P-CCNC: 9 U/L (ref 10–44)
ANION GAP SERPL CALC-SCNC: 6 MMOL/L (ref 8–16)
AST SERPL-CCNC: 15 U/L (ref 10–40)
BASOPHILS # BLD AUTO: 0.03 K/UL (ref 0–0.2)
BASOPHILS NFR BLD: 0.6 % (ref 0–1.9)
BILIRUB SERPL-MCNC: 0.4 MG/DL (ref 0.1–1)
BUN SERPL-MCNC: 6 MG/DL (ref 6–20)
CALCIUM SERPL-MCNC: 8.8 MG/DL (ref 8.7–10.5)
CHLORIDE SERPL-SCNC: 106 MMOL/L (ref 95–110)
CO2 SERPL-SCNC: 26 MMOL/L (ref 23–29)
CREAT SERPL-MCNC: 0.5 MG/DL (ref 0.5–1.4)
DIFFERENTIAL METHOD: ABNORMAL
EOSINOPHIL # BLD AUTO: 0.5 K/UL (ref 0–0.5)
EOSINOPHIL NFR BLD: 11.6 % (ref 0–8)
ERYTHROCYTE [DISTWIDTH] IN BLOOD BY AUTOMATED COUNT: 18.3 % (ref 11.5–14.5)
EST. GFR  (NO RACE VARIABLE): >60 ML/MIN/1.73 M^2
GLUCOSE SERPL-MCNC: 85 MG/DL (ref 70–110)
HCT VFR BLD AUTO: 27.9 % (ref 40–54)
HGB BLD-MCNC: 8.6 G/DL (ref 14–18)
IMM GRANULOCYTES # BLD AUTO: 0.02 K/UL (ref 0–0.04)
IMM GRANULOCYTES NFR BLD AUTO: 0.4 % (ref 0–0.5)
LYMPHOCYTES # BLD AUTO: 1.9 K/UL (ref 1–4.8)
LYMPHOCYTES NFR BLD: 40 % (ref 18–48)
MAGNESIUM SERPL-MCNC: 1.8 MG/DL (ref 1.6–2.6)
MCH RBC QN AUTO: 27.4 PG (ref 27–31)
MCHC RBC AUTO-ENTMCNC: 30.8 G/DL (ref 32–36)
MCV RBC AUTO: 89 FL (ref 82–98)
MONOCYTES # BLD AUTO: 0.5 K/UL (ref 0.3–1)
MONOCYTES NFR BLD: 10.7 % (ref 4–15)
NEUTROPHILS # BLD AUTO: 1.7 K/UL (ref 1.8–7.7)
NEUTROPHILS NFR BLD: 36.7 % (ref 38–73)
NRBC BLD-RTO: 0 /100 WBC
PHOSPHATE SERPL-MCNC: 5 MG/DL (ref 2.7–4.5)
PLATELET # BLD AUTO: 230 K/UL (ref 150–450)
PMV BLD AUTO: 10.4 FL (ref 9.2–12.9)
POTASSIUM SERPL-SCNC: 4 MMOL/L (ref 3.5–5.1)
PROT SERPL-MCNC: 6.2 G/DL (ref 6–8.4)
RBC # BLD AUTO: 3.14 M/UL (ref 4.6–6.2)
SODIUM SERPL-SCNC: 138 MMOL/L (ref 136–145)
WBC # BLD AUTO: 4.67 K/UL (ref 3.9–12.7)

## 2023-10-31 PROCEDURE — 99233 SBSQ HOSP IP/OBS HIGH 50: CPT | Mod: ,,, | Performed by: INTERNAL MEDICINE

## 2023-10-31 PROCEDURE — 25000003 PHARM REV CODE 250

## 2023-10-31 PROCEDURE — 63600175 PHARM REV CODE 636 W HCPCS

## 2023-10-31 PROCEDURE — 36415 COLL VENOUS BLD VENIPUNCTURE: CPT

## 2023-10-31 PROCEDURE — 85025 COMPLETE CBC W/AUTO DIFF WBC: CPT

## 2023-10-31 PROCEDURE — 99233 PR SUBSEQUENT HOSPITAL CARE,LEVL III: ICD-10-PCS | Mod: ,,, | Performed by: INTERNAL MEDICINE

## 2023-10-31 PROCEDURE — 80053 COMPREHEN METABOLIC PANEL: CPT

## 2023-10-31 PROCEDURE — 83735 ASSAY OF MAGNESIUM: CPT

## 2023-10-31 PROCEDURE — 99233 SBSQ HOSP IP/OBS HIGH 50: CPT | Mod: ,,, | Performed by: HOSPITALIST

## 2023-10-31 PROCEDURE — 99233 PR SUBSEQUENT HOSPITAL CARE,LEVL III: ICD-10-PCS | Mod: ,,, | Performed by: HOSPITALIST

## 2023-10-31 PROCEDURE — 99223 PR INITIAL HOSPITAL CARE,LEVL III: ICD-10-PCS | Mod: 25,,, | Performed by: SURGERY

## 2023-10-31 PROCEDURE — 99223 1ST HOSP IP/OBS HIGH 75: CPT | Mod: 25,,, | Performed by: SURGERY

## 2023-10-31 PROCEDURE — 25000003 PHARM REV CODE 250: Performed by: INTERNAL MEDICINE

## 2023-10-31 PROCEDURE — 25000003 PHARM REV CODE 250: Performed by: STUDENT IN AN ORGANIZED HEALTH CARE EDUCATION/TRAINING PROGRAM

## 2023-10-31 PROCEDURE — 84100 ASSAY OF PHOSPHORUS: CPT

## 2023-10-31 PROCEDURE — 12000002 HC ACUTE/MED SURGE SEMI-PRIVATE ROOM

## 2023-10-31 RX ORDER — MORPHINE SULFATE 30 MG/1
30 TABLET, FILM COATED, EXTENDED RELEASE ORAL EVERY 12 HOURS
Status: DISCONTINUED | OUTPATIENT
Start: 2023-10-31 | End: 2023-11-04

## 2023-10-31 RX ORDER — MORPHINE SULFATE 15 MG/1
15 TABLET, FILM COATED, EXTENDED RELEASE ORAL ONCE
Status: COMPLETED | OUTPATIENT
Start: 2023-10-31 | End: 2023-10-31

## 2023-10-31 RX ORDER — SODIUM CHLORIDE, SODIUM LACTATE, POTASSIUM CHLORIDE, CALCIUM CHLORIDE 600; 310; 30; 20 MG/100ML; MG/100ML; MG/100ML; MG/100ML
INJECTION, SOLUTION INTRAVENOUS CONTINUOUS
Status: ACTIVE | OUTPATIENT
Start: 2023-10-31 | End: 2023-10-31

## 2023-10-31 RX ADMIN — HYDROMORPHONE HYDROCHLORIDE 2 MG: 2 INJECTION, SOLUTION INTRAMUSCULAR; INTRAVENOUS; SUBCUTANEOUS at 09:10

## 2023-10-31 RX ADMIN — MEROPENEM 1 G: 1 INJECTION INTRAVENOUS at 05:10

## 2023-10-31 RX ADMIN — FLUCONAZOLE 400 MG: 2 INJECTION, SOLUTION INTRAVENOUS at 05:10

## 2023-10-31 RX ADMIN — PANTOPRAZOLE SODIUM 40 MG: 40 TABLET, DELAYED RELEASE ORAL at 08:10

## 2023-10-31 RX ADMIN — PANCRELIPASE 2 CAPSULE: 30000; 6000; 19000 CAPSULE, DELAYED RELEASE PELLETS ORAL at 11:10

## 2023-10-31 RX ADMIN — PANCRELIPASE 2 CAPSULE: 30000; 6000; 19000 CAPSULE, DELAYED RELEASE PELLETS ORAL at 09:10

## 2023-10-31 RX ADMIN — FOLIC ACID 1 MG: 1 TABLET ORAL at 08:10

## 2023-10-31 RX ADMIN — HYDROMORPHONE HYDROCHLORIDE 2 MG: 2 INJECTION, SOLUTION INTRAMUSCULAR; INTRAVENOUS; SUBCUTANEOUS at 08:10

## 2023-10-31 RX ADMIN — PANCRELIPASE 2 CAPSULE: 30000; 6000; 19000 CAPSULE, DELAYED RELEASE PELLETS ORAL at 05:10

## 2023-10-31 RX ADMIN — MORPHINE SULFATE 15 MG: 15 TABLET, EXTENDED RELEASE ORAL at 08:10

## 2023-10-31 RX ADMIN — MORPHINE SULFATE 30 MG: 30 TABLET, FILM COATED, EXTENDED RELEASE ORAL at 09:10

## 2023-10-31 RX ADMIN — Medication 6 MG: at 08:10

## 2023-10-31 RX ADMIN — ACETAMINOPHEN 1000 MG: 500 TABLET ORAL at 10:10

## 2023-10-31 RX ADMIN — GABAPENTIN 600 MG: 300 CAPSULE ORAL at 08:10

## 2023-10-31 RX ADMIN — HYDROXYZINE PAMOATE 25 MG: 25 CAPSULE ORAL at 09:10

## 2023-10-31 RX ADMIN — POLYETHYLENE GLYCOL 3350 17 G: 17 POWDER, FOR SOLUTION ORAL at 08:10

## 2023-10-31 RX ADMIN — MORPHINE SULFATE 15 MG: 15 TABLET, EXTENDED RELEASE ORAL at 11:10

## 2023-10-31 RX ADMIN — ACETAMINOPHEN 1000 MG: 500 TABLET ORAL at 04:10

## 2023-10-31 RX ADMIN — ACETAMINOPHEN 1000 MG: 500 TABLET ORAL at 06:10

## 2023-10-31 RX ADMIN — HYDROMORPHONE HYDROCHLORIDE 2 MG: 2 INJECTION, SOLUTION INTRAMUSCULAR; INTRAVENOUS; SUBCUTANEOUS at 03:10

## 2023-10-31 RX ADMIN — BICTEGRAVIR SODIUM, EMTRICITABINE, AND TENOFOVIR ALAFENAMIDE FUMARATE 1 TABLET: 50; 200; 25 TABLET ORAL at 09:10

## 2023-10-31 RX ADMIN — ONDANSETRON 4 MG: 2 INJECTION INTRAMUSCULAR; INTRAVENOUS at 05:10

## 2023-10-31 RX ADMIN — HYDROMORPHONE HYDROCHLORIDE 2 MG: 2 INJECTION, SOLUTION INTRAMUSCULAR; INTRAVENOUS; SUBCUTANEOUS at 06:10

## 2023-10-31 RX ADMIN — SENNOSIDES AND DOCUSATE SODIUM 2 TABLET: 8.6; 5 TABLET ORAL at 08:10

## 2023-10-31 RX ADMIN — HYDROMORPHONE HYDROCHLORIDE 2 MG: 2 INJECTION, SOLUTION INTRAMUSCULAR; INTRAVENOUS; SUBCUTANEOUS at 01:10

## 2023-10-31 RX ADMIN — HYDROMORPHONE HYDROCHLORIDE 2 MG: 2 INJECTION, SOLUTION INTRAMUSCULAR; INTRAVENOUS; SUBCUTANEOUS at 04:10

## 2023-10-31 RX ADMIN — MEROPENEM 1 G: 1 INJECTION INTRAVENOUS at 02:10

## 2023-10-31 RX ADMIN — APIXABAN 5 MG: 5 TABLET, FILM COATED ORAL at 08:10

## 2023-10-31 RX ADMIN — MEROPENEM 1 G: 1 INJECTION INTRAVENOUS at 09:10

## 2023-10-31 RX ADMIN — SODIUM CHLORIDE, POTASSIUM CHLORIDE, SODIUM LACTATE AND CALCIUM CHLORIDE: 600; 310; 30; 20 INJECTION, SOLUTION INTRAVENOUS at 01:10

## 2023-10-31 RX ADMIN — GABAPENTIN 600 MG: 300 CAPSULE ORAL at 04:10

## 2023-10-31 NOTE — PLAN OF CARE
Problem: Adult Inpatient Plan of Care  Goal: Plan of Care Review  10/31/2023 0517 by Roxann Parekh RN  Outcome: Ongoing, Progressing  10/31/2023 0516 by Roxann Parekh RN  Outcome: Ongoing, Progressing  Goal: Patient-Specific Goal (Individualized)  Outcome: Ongoing, Progressing  Goal: Absence of Hospital-Acquired Illness or Injury  Outcome: Ongoing, Progressing  Goal: Optimal Comfort and Wellbeing  Outcome: Ongoing, Progressing  Goal: Readiness for Transition of Care  Outcome: Ongoing, Progressing     Problem: Adult Inpatient Plan of Care  Goal: Plan of Care Review  10/31/2023 0517 by Roxann Parekh RN  Outcome: Ongoing, Progressing  10/31/2023 0516 by Roxann Parekh RN  Outcome: Ongoing, Progressing  Goal: Patient-Specific Goal (Individualized)  Outcome: Ongoing, Progressing  Goal: Absence of Hospital-Acquired Illness or Injury  Outcome: Ongoing, Progressing  Goal: Optimal Comfort and Wellbeing  Outcome: Ongoing, Progressing  Goal: Readiness for Transition of Care  Outcome: Ongoing, Progressing     Problem: Nausea and Vomiting  Goal: Fluid and Electrolyte Balance  Outcome: Ongoing, Progressing     Problem: Fluid Imbalance (Pancreatitis)  Goal: Fluid Balance  Outcome: Ongoing, Progressing     Problem: Infection (Pancreatitis)  Goal: Infection Symptom Resolution  Outcome: Ongoing, Progressing     Problem: Nutrition Impaired (Pancreatitis)  Goal: Optimal Nutrition Intake  Outcome: Ongoing, Progressing     Problem: Pain (Pancreatitis)  Goal: Acceptable Pain Control  Outcome: Ongoing, Progressing     Problem: Respiratory Compromise (Pancreatitis)  Goal: Effective Oxygenation and Ventilation  Outcome: Ongoing, Progressing     Problem: Adjustment to Illness (Sepsis/Septic Shock)  Goal: Optimal Coping  Outcome: Ongoing, Progressing     Problem: Bleeding (Sepsis/Septic Shock)  Goal: Absence of Bleeding  Outcome: Ongoing, Progressing     Problem: Glycemic Control Impaired (Sepsis/Septic Shock)  Goal: Blood  Glucose Level Within Desired Range  Outcome: Ongoing, Progressing     Problem: Infection Progression (Sepsis/Septic Shock)  Goal: Absence of Infection Signs and Symptoms  Outcome: Ongoing, Progressing     Problem: Nutrition Impaired (Sepsis/Septic Shock)  Goal: Optimal Nutrition Intake  Outcome: Ongoing, Progressing    POC reviewed with pt. Pt verbalized understanding . Pt had an uneventful night  VSS. Pt given prn pain meds around the clock. Pt remains on LR at a 100 ml/hr. Pt is free of falls and injuries Bed in lowest position and call light with reach. Safety maintained

## 2023-10-31 NOTE — PROGRESS NOTES
[facility] - Intensive Care ([place] [id number])  Infectious Disease  Progress Note    Patient Name: Tasia Cardona  MRN: [id number]  Admission Date: 10/21/2023  Length of Stay: 9 days  Attending Physician: Osmin Dutta MD  Primary Care Provider: Pina Jones NP    Isolation Status: No active isolations  Assessment/Plan:      ID  HIV infection  Chronic condition, stable. On HAART with Biktarvy.    - Continue Biktarvy    GI  * Chronic pancreatitis  23M with history of HIV and polycythemia vera, p/w 1 month of refractory severe abdominal pain, nausea, and vomiting, with CT imaging showing peripancreatic, multi-lobulated collections of fluid. Most recent CT showing extension of fluid to the posterior liver. Pt now also with fever on 10/29 despite adherence with IV cefepime at home since discharge on 10/10, and continuation of IV cefepime during this admission. Cultures have always been negative and most recent cultures from 10/25 remain negative. Peripancreatic fluid drainage deferred by GI and IR at this time, due to small size and immaturity of fluid collections and no apparent indication for emergent drainage at present. ID consulted regarding initiation of meropenem in setting of possible treatment failure with previous antibiotic regimens. No other obvious source of infection asides from pancreas at present.    Recommendations:  - Continue Meropenem at present to cover for anaerobes and enterococcus, and patient has not responded to ciprofloxacin or metronidazole in the past.  - Continue Fluconazole 400mg q24h, as yeast and other fungi can sometimes translocate in the GI tract to cause infection  - Repeat blood cultures NGTD  - Gen Surgery recommend no surgical intervention at this time.  - At present, recommend continuing medical management with Meropenem and Fluconazole, and to continue both for at least 2 more weeks, 11/13/23, with repeat abdominal imaging performed at that time to evaluate for  improvement or worsening of peripancreatic fluid collection.  - Arrange for follow-up appointment with outpatient ID to discuss imaging results and if antibiotics should be continued.    Infectious Disease will sign-off. Please call with any additional questions, concerns or changes in the patient's clinical status. Thank you for involving us in the care of this patient. Patient discussed and seen with Dr. Velasco, staff attestation to follow.    Cedric Hester MD  Infectious Disease  [facility] - Intensive Care ([facility]-)    Subjective:     Principal Problem:Chronic pancreatitis    HPI: Tasia Cardona is a 23-year-old man with a history of HIV on HAART, asthma, polycythemia vera, and necrotizing pancreatitis, who presents with refractory abdominal pain due to necrotizing pancreatitis. Recent history includes admission to [facility] from 9/17 to 9/25, with initial concerns for hematemesis and Corinne-Chauhan tear; post-EGD he developed severe abdominal pain and CT imaging showed findings consistent with acute necrotizing pancreatitis with splenic vein thrombosis. Hematology consulted and anticoagulation not recommended for splenic vein thrombosis, he received 2 days of empiric IV antibiotic therapy with vancomycin and cefepime prior to discharge. Patient was then admitted to [facility] on 9/29 to 10/4 with continued uncontrolled abdominal pain and fevers. Repeat CT scan demonstrated sequela of necrotizing pancreatitis with encapsulated, multi-lobulated peripancreatic fluid collection. He was started on empiric meropenem until 10/2 where he was transitioned to PO Cipro and Flagyl and discharged with that regimen. He was discharged on 10/4 and then re-presented to [facility] on 10/5 due to continued severe abdominal pain and subjective fevers. Found to have uptrending leukocytosis from 11 on 10/4 to 23 on 10/5. He was started on meropenem and then transitioned to zosyn. GI consulted and deferred endoscopic drainage given recency of fluid  collections and no indication of infection or obstruction. ID was consulted and initially recommended IV cefepime 2g BID for 4 week course (EOT 11/2). Patient was discharged on 10/10, and subsequently presented again on 10/12 with intractable abdominal pain, nausea, and vomiting. Repeat CT a/p on 10/12 showed grossly stable findings from CT from prior admissions. GI consulted again and recommended deferral of peripancreatic fluid drainage. Pain improved after some time and pt was discharged on 10/19. He then presented again for this current admission on 10/21 with the same symptoms as prior. This hospitalization complicated by fever of 102.2 on 10/29. Additionally pt reports ongoing night sweats that been persistent for some time, but particularly on the 25th, so blood cultures were drawn which remain NGTD. Patient remains on IV cefepime as recommended previously by ID. ID consulted now for consideration of initiating meropenem.    Interval History: No acute events overnight. Patient still reporting severe abdominal pain and nausea without much improvement. Afebrile overnight. Cultures remain NGTD. Started on fluconazole. Gen Surg recommending no surgical intervention.    Review of Systems   Constitutional:  Positive for diaphoresis. Negative for chills and fever.   HENT:  Negative for congestion and sore throat.    Eyes:  Negative for photophobia and visual disturbance.   Respiratory:  Negative for cough and shortness of breath.    Cardiovascular:  Negative for chest pain and palpitations.   Gastrointestinal:  Positive for abdominal pain and nausea.   Genitourinary:  Negative for dysuria and hematuria.   Musculoskeletal:  Negative for arthralgias and back pain.   Skin:  Negative for rash and wound.   Neurological:  Negative for dizziness and syncope.   Psychiatric/Behavioral:  Negative for agitation and behavioral problems.      Objective:     Vital Signs (Most Recent):  Temp: 98.3 °F (36.8 °C) (10/31/23  0911)  Pulse: 75 (10/31/23 0911)  Resp: 18 (10/31/23 0930)  BP: 113/82 (10/31/23 0911)  SpO2: (!) 94 % (10/31/23 0911) Vital Signs (24h Range):  Temp:  [98.2 °F (36.8 °C)-98.7 °F (37.1 °C)] 98.3 °F (36.8 °C)  Pulse:  [75-97] 75  Resp:  [16-18] 18  SpO2:  [92 %-95 %] 94 %  BP: (106-121)/(63-82) 113/82     Weight: 68 kg (150 lb)  Body mass index is 21.52 kg/m².    Estimated Creatinine Clearance: 219.1 mL/min (based on SCr of 0.5 mg/dL).     Physical Exam  Constitutional:       Appearance: Normal appearance.   HENT:      Head: Normocephalic and atraumatic.      Nose: Nose normal.      Mouth/Throat:      Mouth: Mucous membranes are moist.   Eyes:      Pupils: Pupils are equal, round, and reactive to light.   Cardiovascular:      Rate and Rhythm: Normal rate and regular rhythm.   Pulmonary:      Effort: Pulmonary effort is normal.      Breath sounds: Normal breath sounds.   Abdominal:      General: Abdomen is flat. Bowel sounds are normal. There is no distension.      Palpations: Abdomen is soft.      Tenderness: There is abdominal tenderness (epigastric).   Musculoskeletal:         General: Normal range of motion.      Cervical back: Normal range of motion.   Skin:     General: Skin is warm.   Neurological:      General: No focal deficit present.      Mental Status: He is alert and oriented to person, place, and time.   Psychiatric:         Mood and Affect: Mood normal.          Significant Labs: CBC:   Recent Labs   Lab 10/30/23  0228 10/31/23  0410   WBC 7.40 4.67   HGB 9.4* 8.6*   HCT 31.3* 27.9*    230     CMP:   Recent Labs   Lab 10/30/23  0228 10/31/23  0410    138   K 4.2 4.0    106   CO2 22* 26   GLU 95 85   BUN 7 6   CREATININE 0.6 0.5   CALCIUM 9.1 8.8   PROT 6.8 6.2   ALBUMIN 2.8* 2.6*   BILITOT 0.3 0.4   ALKPHOS 76 72   AST 14 15   ALT 7* 9*   ANIONGAP 13 6*       Significant Imaging: I have reviewed all pertinent imaging results/findings within the past 24 hours.

## 2023-10-31 NOTE — ASSESSMENT & PLAN NOTE
Malnutrition Type:  Context: chronic illness  Level: moderate    Related to (etiology):   Inadequate energy intake    Signs and Symptoms (as evidenced by):   Malnutrition Characteristic Summary:  Weight Loss (Malnutrition): 5% in 1 month  Energy Intake (Malnutrition): less than 75% for greater than or equal to 1 month    Interventions/Recommendations (treatment strategy):  1. When medically able, ADAT Regular diet    2. Add Boost Breeze TID. Modify to Boost Plus (vanilla) when diet advanced.   3. RD to monitor and follow    Nutrition Diagnosis Status:   new

## 2023-10-31 NOTE — SUBJECTIVE & OBJECTIVE
Interval History: Afebrile since Sunday evening. Broadened to Diflucan/Meropenem, ID consulted. No pockets for IR, AES unable to aspirate endoscopically; and general surgery unable to offer any intervention. Tolerate CLD well yesterday without N/V/diarrhea. Continued abdominal pain with similar characteristics as yesterday.       Review of Systems   Constitutional:  Negative for appetite change, chills, fatigue and fever.   HENT:  Negative for congestion and sore throat.    Respiratory:  Negative for cough and shortness of breath.    Cardiovascular:  Negative for chest pain.   Gastrointestinal:  Positive for abdominal pain and nausea. Negative for constipation, diarrhea and vomiting.   Genitourinary:  Negative for dysuria and flank pain.   Musculoskeletal:  Positive for back pain. Negative for joint swelling.   Neurological:  Negative for dizziness and headaches.   Psychiatric/Behavioral:  Negative for confusion. The patient is not nervous/anxious.      Objective:     Vital Signs (Most Recent):  Temp: 98.3 °F (36.8 °C) (10/31/23 1201)  Pulse: 80 (10/31/23 1201)  Resp: 18 (10/31/23 1201)  BP: 116/76 (10/31/23 1201)  SpO2: (!) 92 % (10/31/23 1201) Vital Signs (24h Range):  Temp:  [98.2 °F (36.8 °C)-98.7 °F (37.1 °C)] 98.3 °F (36.8 °C)  Pulse:  [75-89] 80  Resp:  [16-18] 18  SpO2:  [92 %-95 %] 92 %  BP: (106-121)/(63-82) 116/76     Weight: 68 kg (150 lb)  Body mass index is 21.52 kg/m².    Intake/Output Summary (Last 24 hours) at 10/31/2023 1213  Last data filed at 10/30/2023 2000  Gross per 24 hour   Intake 240 ml   Output --   Net 240 ml           Physical Exam  Constitutional:       Appearance: Normal appearance.   HENT:      Head: Normocephalic and atraumatic.      Nose: Nose normal.      Mouth/Throat:      Mouth: Mucous membranes are moist.   Eyes:      Pupils: Pupils are equal, round, and reactive to light.   Cardiovascular:      Rate and Rhythm: Normal rate.   Pulmonary:      Effort: Pulmonary effort is normal.       Breath sounds: Normal breath sounds.   Abdominal:      General: Abdomen is flat. Bowel sounds are normal. There is no distension.      Palpations: Abdomen is soft. There is no mass.      Tenderness: There is abdominal tenderness (upper quadrant). There is no right CVA tenderness, left CVA tenderness, guarding or rebound.   Musculoskeletal:         General: Normal range of motion.      Cervical back: Normal range of motion.   Skin:     General: Skin is warm.   Neurological:      General: No focal deficit present.      Mental Status: He is alert and oriented to person, place, and time.   Psychiatric:         Mood and Affect: Mood normal.             Significant Labs: All pertinent labs within the past 24 hours have been reviewed.    Significant Imaging: I have reviewed all pertinent imaging results/findings within the past 24 hours.

## 2023-10-31 NOTE — PROGRESS NOTES
"[facility] - Intensive Care ([place] [id number])  [facility] Medicine  Progress Note    Patient Name: Tasia Cardona  MRN: [id number]  Patient Class: IP- Inpatient   Admission Date: 10/21/2023  Length of Stay: 9 days  Attending Physician: Osmin Dutta MD  Primary Care Provider: Pina Jones NP        Subjective:     Principal Problem:Chronic pancreatitis        HPI:  24yo M w/PMHx of HIV (on Biktarvy, last CD4 296 from 9/20/23), EtOH use, acute necrotizing pancreatitis, splenic vein thrombosis (on Eliquis), asthma, and anemia who presents to ED for persistent abdominal pain and vomiting. He has been in and out of the hospital for the last two weeks. His most recent discharge was on10/19 after admission for recurrent symptoms of his acute necrotizing pancreatitis. He was discharged home on MSIR and Morphine which was working well until yesterday morning (10/21) when he started having increased abdominal pain w/nausea. He attempted to eat something but vomited. He denies any recent alcohol or tobacco use. He reports eating normally since discharge. Last meal was a grilled cheese yesterday for dinner. Last BM was loose and "oily" but normal color w/out blood present. He was evaluated by AES on his 10/5 admission who recommended against any intervention on the pancreatic collection due to immaturity. He has been on IV Cefepime since 10/5 with possible end date 11/2.     In the ED, he was afebrile and tachycardic to the 130s. Labs were notable for K 3.3, Bicarb 21, BUN 5, Alb 3.0, Lipase 96. Lactate WNL. Repeat CT A/P showed small R pleural effusion but no acute intra-abdominopelvic abnormality. Peripancreatic fluid collection at the pancreatic tail stable from previous imaging. He was given Dilaudid 2mg x1, Dilaudid 1mg x2 for pain and his evening dose of Cefepime. Hospital Medicine was called for admission of     Overview/Hospital Course:  Admitted to hospital medicine. Cefepime and MS contin continued, with " "addition of scheduled tylenol + toradol and then breakthrough dilaudid. AES consulted again now that imaging findings were stable >4w since initial diagnosis and persistent symptoms. Underwent EUS with findings of stable fluid collection which was not drained ("Complex collections along the neck, body, and tail of pancreas (body/tail region likely connected). Not suitable for endoscopic transmural stenting/drainage.") He will need follow-up CT scan in 4-6 weeks. Cefepime to complete 11/2/23. Patient with increasing pain and inadequate pain control despite dilaudid 2mg q4h w/ MS Contin 15mg bid and multimodal pain medication. A repeat CT Abd/Pelvis on 10/29 showed similar prior fluid collection with new fluid along the posterior aspect of the liver causing IVC compression. Patients case discussed with both IR and AES, both of whom do not feel he is a good candidate for drainage, either percutaneously/transhepatic/transpleural or endoscopically. ID consulted and antibiotics broadened to Meropenem + Diflucan on 10/30 after fevers on evening of 10/29. MS Contin increased to 30 mg BID.          Interval History: Afebrile since Sunday evening. Broadened to Diflucan/Meropenem, ID consulted. No pockets for IR, AES unable to aspirate endoscopically; and general surgery unable to offer any intervention. Tolerate CLD well yesterday without N/V/diarrhea. Continued abdominal pain with similar characteristics as yesterday.       Review of Systems   Constitutional:  Negative for appetite change, chills, fatigue and fever.   HENT:  Negative for congestion and sore throat.    Respiratory:  Negative for cough and shortness of breath.    Cardiovascular:  Negative for chest pain.   Gastrointestinal:  Positive for abdominal pain and nausea. Negative for constipation, diarrhea and vomiting.   Genitourinary:  Negative for dysuria and flank pain.   Musculoskeletal:  Positive for back pain. Negative for joint swelling.   Neurological:  " Negative for dizziness and headaches.   Psychiatric/Behavioral:  Negative for confusion. The patient is not nervous/anxious.      Objective:     Vital Signs (Most Recent):  Temp: 98.3 °F (36.8 °C) (10/31/23 1201)  Pulse: 80 (10/31/23 1201)  Resp: 18 (10/31/23 1201)  BP: 116/76 (10/31/23 1201)  SpO2: (!) 92 % (10/31/23 1201) Vital Signs (24h Range):  Temp:  [98.2 °F (36.8 °C)-98.7 °F (37.1 °C)] 98.3 °F (36.8 °C)  Pulse:  [75-89] 80  Resp:  [16-18] 18  SpO2:  [92 %-95 %] 92 %  BP: (106-121)/(63-82) 116/76     Weight: 68 kg (150 lb)  Body mass index is 21.52 kg/m².    Intake/Output Summary (Last 24 hours) at 10/31/2023 1213  Last data filed at 10/30/2023 2000  Gross per 24 hour   Intake 240 ml   Output --   Net 240 ml           Physical Exam  Constitutional:       Appearance: Normal appearance.   HENT:      Head: Normocephalic and atraumatic.      Nose: Nose normal.      Mouth/Throat:      Mouth: Mucous membranes are moist.   Eyes:      Pupils: Pupils are equal, round, and reactive to light.   Cardiovascular:      Rate and Rhythm: Normal rate.   Pulmonary:      Effort: Pulmonary effort is normal.      Breath sounds: Normal breath sounds.   Abdominal:      General: Abdomen is flat. Bowel sounds are normal. There is no distension.      Palpations: Abdomen is soft. There is no mass.      Tenderness: There is abdominal tenderness (upper quadrant). There is no right CVA tenderness, left CVA tenderness, guarding or rebound.   Musculoskeletal:         General: Normal range of motion.      Cervical back: Normal range of motion.   Skin:     General: Skin is warm.   Neurological:      General: No focal deficit present.      Mental Status: He is alert and oriented to person, place, and time.   Psychiatric:         Mood and Affect: Mood normal.             Significant Labs: All pertinent labs within the past 24 hours have been reviewed.    Significant Imaging: I have reviewed all pertinent imaging results/findings within the past  "24 hours.    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 Lipase mildly elevated. WBC and lactate WNL. On IV Cefepime for 4wk course. Discharged on MSIR and Morphine for pain control. EUS (10/23/23) "complex collections along the neck, body, and tail of pancreas. Not suitable for endoscopic transmural stenting/drainage." Scheduled for follow up CT in 4-6 weeks. A repeat CT was obtained on 10/29 due to worsening abdominal pain despite high opioid doses, showing new fluid collection pressing on IVC. Discussed with AES, who will not be able to drain new collection endoscopically, and IR, who does not have a good sampling window and is reluctant to use transhepatic/transpleural methods 2/2 creating infectious tract. General surgery consulted yesterday, though no plans for acute intervention.     - continue CLD diet today; continue creon  - Meropenem and Diflucan started (10/30) per ID recs  - f/u new blood cultures from 10/30  - will discuss with IR regarding older collections to see if aspiration is viable, for both diagnostic and therapeutic reasons.  - Consult ID   - pain control - Dilaudid 2 mg Q3H PRN; scheduled Tylenol, gabapentin 600 mg TID; MS contin 30 mg BID           Therapeutic opioid-induced constipation (OIC)  Patient with extended course of high-potency opioids since being hospitalized multiple times for pancreatitis, starting on 9/17/23. KUB (10/27/23) notable for high-stool burden, though without focal dilatation, transition points, or free air. Patient with increased abdominal pain that is different in character and location from description of pain on admission, now including dull, achey pain in belt like distribution with intermittent nausea. Interval CT abdomen " "(10/30) with decreased stool burden following lactulose multiple times daily.     - hold current bowel regimen as patient on CLD and low stool burden on CT  - can add enemas if needed    Pancreatic pseudocyst/cyst  - Will need f/u CT scan in 4 weeks per GI  - Continue pain control, creon, low fat diet.    Hypokalemia  K 3.3 on admission; likely 2/2 to decreased PO intake    - Gave IV K 10meq Q1H for 6 doses   - Replete K PRN       Continuous severe abdominal pain  Likely 2/2 to pancreatitis; Discharged home on Morphine taper which was working well until yesterday morning    - See Chronic Pancreatitis       Alcohol use disorder  Hx of EtOH use disorder. On most recent admission, patient's ethanol negative in setting of pancreatitis flare.   - monitor for signs of withdrawal, though patient denies EtOH use in between recent admissions      Splenic vein thrombosis  Splenic vein thrombus originally seen on 9/20 imaging, and persistent on most recent CT abdominal imaging. Started on DOAC at last admission, loading dose completed.     - Continue eliquis 5 mg BID      Mild intermittent asthma without complication  History of asthma requiring PRN albuterol. Has not recently been using any inhaler treatment.     Normocytic anemia  Admit with hemoglobin 10.2; Baseline ~ 10    Lab Results   Component Value Date    HGB 8.6 (L) 10/31/2023     - Daily CBC   - Transfuse hgb <7       Polycythemia vera  Hgb 10.5 on admission. Follows up with hematology as outpatient.   - continue to monitor daily CBC      HIV infection  This patient in known to have HIV+ status (Have detected HIV PCR but never CD4 <200 or AIDS defining illness). Labs reviewed- No results found for: "CD4", No results found for: "HIVDNAPCR". Patient is on HAART. Will continue HAART. Continue to monitor routine labs. Last CD4 count was   Lab Results   Component Value Date    ABSOLUTECD4 1080 10/12/2023     - Continue home Biktarvy         VTE Risk Mitigation (From " admission, onward)           Ordered     apixaban tablet 5 mg  2 times daily         10/24/23 0744                    Discharge Planning   CASTILLO: 11/3/2023     Code Status: Full Code   Is the patient medically ready for discharge?:     Reason for patient still in hospital (select all that apply): Patient trending condition and Treatment  Discharge Plan A: Home with family                  Noah Trinh MD  Department of Hospital Medicine   [facility] - Intensive Care ([place])

## 2023-10-31 NOTE — SUBJECTIVE & OBJECTIVE
Interval History: No acute events overnight. Patient still reporting severe abdominal pain and nausea without much improvement. Afebrile overnight. Cultures remain NGTD. Started on fluconazole. Gen Surg recommending no surgical intervention.    Review of Systems   Constitutional:  Positive for diaphoresis. Negative for chills and fever.   HENT:  Negative for congestion and sore throat.    Eyes:  Negative for photophobia and visual disturbance.   Respiratory:  Negative for cough and shortness of breath.    Cardiovascular:  Negative for chest pain and palpitations.   Gastrointestinal:  Positive for abdominal pain and nausea.   Genitourinary:  Negative for dysuria and hematuria.   Musculoskeletal:  Negative for arthralgias and back pain.   Skin:  Negative for rash and wound.   Neurological:  Negative for dizziness and syncope.   Psychiatric/Behavioral:  Negative for agitation and behavioral problems.      Objective:     Vital Signs (Most Recent):  Temp: 98.3 °F (36.8 °C) (10/31/23 0911)  Pulse: 75 (10/31/23 0911)  Resp: 18 (10/31/23 0930)  BP: 113/82 (10/31/23 0911)  SpO2: (!) 94 % (10/31/23 0911) Vital Signs (24h Range):  Temp:  [98.2 °F (36.8 °C)-98.7 °F (37.1 °C)] 98.3 °F (36.8 °C)  Pulse:  [75-97] 75  Resp:  [16-18] 18  SpO2:  [92 %-95 %] 94 %  BP: (106-121)/(63-82) 113/82     Weight: 68 kg (150 lb)  Body mass index is 21.52 kg/m².    Estimated Creatinine Clearance: 219.1 mL/min (based on SCr of 0.5 mg/dL).     Physical Exam  Constitutional:       Appearance: Normal appearance.   HENT:      Head: Normocephalic and atraumatic.      Nose: Nose normal.      Mouth/Throat:      Mouth: Mucous membranes are moist.   Eyes:      Pupils: Pupils are equal, round, and reactive to light.   Cardiovascular:      Rate and Rhythm: Normal rate and regular rhythm.   Pulmonary:      Effort: Pulmonary effort is normal.      Breath sounds: Normal breath sounds.   Abdominal:      General: Abdomen is flat. Bowel sounds are normal. There  is no distension.      Palpations: Abdomen is soft.      Tenderness: There is abdominal tenderness (epigastric).   Musculoskeletal:         General: Normal range of motion.      Cervical back: Normal range of motion.   Skin:     General: Skin is warm.   Neurological:      General: No focal deficit present.      Mental Status: He is alert and oriented to person, place, and time.   Psychiatric:         Mood and Affect: Mood normal.          Significant Labs: CBC:   Recent Labs   Lab 10/30/23  0228 10/31/23  0410   WBC 7.40 4.67   HGB 9.4* 8.6*   HCT 31.3* 27.9*    230     CMP:   Recent Labs   Lab 10/30/23  0228 10/31/23  0410    138   K 4.2 4.0    106   CO2 22* 26   GLU 95 85   BUN 7 6   CREATININE 0.6 0.5   CALCIUM 9.1 8.8   PROT 6.8 6.2   ALBUMIN 2.8* 2.6*   BILITOT 0.3 0.4   ALKPHOS 76 72   AST 14 15   ALT 7* 9*   ANIONGAP 13 6*       Significant Imaging: I have reviewed all pertinent imaging results/findings within the past 24 hours.

## 2023-10-31 NOTE — PROGRESS NOTES
[facility] - Intensive Care ([place] [address])  Adult Nutrition  Progress Note    SUMMARY       Recommendations    1. When medically able, ADAT Regular diet      2. Add Boost Breeze TID. Modify to Boost Plus (vanilla) when diet advanced.      3. RD to monitor and follow    Goals: Meet % EEN, EPN by RD f/u  Nutrition Goal Status: new  Communication of RD Recs:  (POC)    Assessment and Plan    Endocrine  Moderate malnutrition  Malnutrition Type:  Context: chronic illness  Level: moderate    Related to (etiology):   Inadequate energy intake    Signs and Symptoms (as evidenced by):   Malnutrition Characteristic Summary:  Weight Loss (Malnutrition): 5% in 1 month  Energy Intake (Malnutrition): less than 75% for greater than or equal to 1 month    Interventions/Recommendations (treatment strategy):  1. When medically able, ADAT Regular diet    2. Add Boost Breeze TID. Modify to Boost Plus (vanilla) when diet advanced.   3. RD to monitor and follow    Nutrition Diagnosis Status:   new         Malnutrition Assessment  Malnutrition Context: chronic illness          Weight Loss (Malnutrition): 5% in 1 month  Energy Intake (Malnutrition): less than 75% for greater than or equal to 1 month   Orbital Region (Subcutaneous Fat Loss): well nourished  Upper Arm Region (Subcutaneous Fat Loss): well nourished   [place] Region (Muscle Loss): well nourished  Clavicle Bone Region (Muscle Loss): well nourished  Clavicle and Acromion Bone Region (Muscle Loss): well nourished  Dorsal Hand (Muscle Loss): well nourished  Patellar Region (Muscle Loss): well nourished  Anterior Thigh Region (Muscle Loss): well nourished  Posterior Calf Region (Muscle Loss): well nourished       Reason for Assessment    Reason For Assessment: length of stay  Diagnosis:  (chronic pancreatitis)  Relevant Medical History: HIV, normocytic anemia, alcohol use disorder, necrotizing pancreatitis  Interdisciplinary Rounds: did not attend    General Information Comments:  "  LOS 9 days. Currently on CLD due to chronic pancreatitis. Pt tolerating liquid diet but still having abdominal pain. Reports was not taking creon PTA. Reports decreased PO intake for a while. Has had abdominal pain and nausea in the past 2 months. Per wt hx, -158 lb, noted wt loss of 8 lb (5%) x 3.5 weeks. NFPE completed today, pt appears nourished physically. Pt meet criteria for moderate malnutrition in the context of chronic illness.    Nutrition Discharge Planning: Pending medical course    Nutrition Risk Screen    Nutrition Risk Screen: no indicators present    Nutrition/Diet History    Spiritual, Cultural Beliefs, Oriental orthodox Practices, Values that Affect Care: no    Anthropometrics    Temp: 99 °F (37.2 °C)  Height Method: Stated  Height: 5' 10" (177.8 cm)  Height (inches): 70 in  Weight Method: Bed Scale  Weight: 68 kg (150 lb)  Weight (lb): 150 lb  Ideal Body Weight (IBW), Male: 166 lb  % Ideal Body Weight, Male (lb): 90.36 %  BMI (Calculated): 21.5       Lab/Procedures/Meds    Pertinent Labs Reviewed: reviewed  Pertinent Labs Comments: P 5.0, albumin 2.6, ALT 9  Pertinent Medications Reviewed: reviewed  Pertinent Medications Comments: Creon, folic acid, pantoprazole, lactated ringer    Estimated/Assessed Needs    Weight Used For Calorie Calculations: 68 kg (150 lb)  Energy Calorie Requirements (kcal): 1686-7213 kcal  Energy Need Method: Kcal/kg (25-30)  Protein Requirements: 68-82g (1-1.2g/kg)  Weight Used For Protein Calculations: 68 kg (150 lb)  Fluid Requirements (mL): 1ml/kcal or per MD  Estimated Fluid Requirement Method: RDA Method  RDA Method (mL): 1700         Nutrition Prescription Ordered    Current Diet Order: CLD    Evaluation of Received Nutrient/Fluid Intake    I/O: + 8.7 L since admit  Energy Calories Required: not meeting needs  Protein Required: not meeting needs  Comments: LBM 10/29  Tolerance: tolerating    Nutrition Risk    Level of Risk/Frequency of Follow-up:  (1x/week) "     Monitor and Evaluation    Food and Nutrient Intake: energy intake, food and beverage intake  Food and Nutrient Adminstration: diet order, enteral and parenteral nutrition administration  Physical Activity and Function: nutrition-related ADLs and IADLs  Anthropometric Measurements: height/length, weight, weight change, body mass index  Biochemical Data, Medical Tests and Procedures: electrolyte and renal panel, gastrointestinal profile, lipid profile, glucose/endocrine profile, inflammatory profile  Nutrition-Focused Physical Findings: overall appearance     Nutrition Follow-Up    RD Follow-up?: Yes

## 2023-10-31 NOTE — ASSESSMENT & PLAN NOTE
23M with history of HIV and polycythemia vera, p/w 1 month of refractory severe abdominal pain, nausea, and vomiting, with CT imaging showing peripancreatic, multi-lobulated collections of fluid. Most recent CT showing extension of fluid to the posterior liver. Pt now also with fever on 10/29 despite adherence with IV cefepime at home since discharge on 10/10, and continuation of IV cefepime during this admission. Cultures have always been negative and most recent cultures from 10/25 remain negative. Peripancreatic fluid drainage deferred by GI and IR at this time, due to small size and immaturity of fluid collections and no apparent indication for emergent drainage at present. ID consulted regarding initiation of meropenem in setting of possible treatment failure with previous antibiotic regimens. No other obvious source of infection asides from pancreas at present.    Recommendations:  - Continue Meropenem at present to cover for anaerobes and enterococcus, and patient has not responded to ciprofloxacin or metronidazole in the past.  - Continue Fluconazole 400mg q24h, as yeast and other fungi can sometimes translocate in the GI tract to cause infection  - Repeat blood cultures NGTD  - Gen Surgery recommend no surgical intervention at this time.  - At present, recommend continuing medical management with Meropenem and Fluconazole, and to continue both for at least 2 more weeks, 11/13/23, with repeat abdominal imaging performed at that time to evaluate for improvement or worsening of peripancreatic fluid collection.  - Arrange for follow-up appointment with outpatient ID to discuss imaging results and if antibiotics should be continued.

## 2023-10-31 NOTE — PLAN OF CARE
Recommendations    1. When medically able, ADAT Regular diet      2. Add Boost Breeze TID. Modify to Boost Plus (vanilla) when diet advanced.      3. RD to monitor and follow    Goals: Meet % EEN, EPN by RD f/u  Nutrition Goal Status: new  Communication of RD Recs:  (POC)

## 2023-10-31 NOTE — ASSESSMENT & PLAN NOTE
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 Lipase mildly elevated. WBC and lactate WNL. On IV Cefepime for 4wk course. Discharged on MSIR and Morphine for pain control. EUS (10/23/23) "complex collections along the neck, body, and tail of pancreas. Not suitable for endoscopic transmural stenting/drainage." Scheduled for follow up CT in 4-6 weeks. A repeat CT was obtained on 10/29 due to worsening abdominal pain despite high opioid doses, showing new fluid collection pressing on IVC. Discussed with AES, who will not be able to drain new collection endoscopically, and IR, who does not have a good sampling window and is reluctant to use transhepatic/transpleural methods 2/2 creating infectious tract. General surgery consulted yesterday, though no plans for acute intervention.     - continue CLD diet today; continue creon  - Meropenem and Diflucan started (10/30) per ID recs  - f/u new blood cultures from 10/30  - will discuss with IR regarding older collections to see if aspiration is viable, for both diagnostic and therapeutic reasons.  - Consult ID   - pain control - Dilaudid 2 mg Q3H PRN; scheduled Tylenol, gabapentin 600 mg TID; MS contin 30 mg BID         "

## 2023-10-31 NOTE — ASSESSMENT & PLAN NOTE
Patient with extended course of high-potency opioids since being hospitalized multiple times for pancreatitis, starting on 9/17/23. KUB (10/27/23) notable for high-stool burden, though without focal dilatation, transition points, or free air. Patient with increased abdominal pain that is different in character and location from description of pain on admission, now including dull, achey pain in belt like distribution with intermittent nausea. Interval CT abdomen (10/30) with decreased stool burden following lactulose multiple times daily.     - hold current bowel regimen as patient on CLD and low stool burden on CT  - can add enemas if needed

## 2023-10-31 NOTE — ASSESSMENT & PLAN NOTE
Admit with hemoglobin 10.2; Baseline ~ 10    Lab Results   Component Value Date    HGB 8.6 (L) 10/31/2023     - Daily CBC   - Transfuse hgb <7

## 2023-10-31 NOTE — PLAN OF CARE
Outpatient Antibiotic Therapy Plan:    Please send referral to [facility]fusion Pharm[facility].    1) Infection: Infected pancreatic fluid collection    2) Discharge Antibiotics:    Intravenous antibiotics:  Meropenem 1 gram IV q 8 hours   Fluconazole 400 mg IV q 24 hours - this can be switched over to oral fluconazole at same dose when appropriate and tolerated    3) Therapy Duration:  two weeks    Estimated end date of IV antibiotics: 11/12/2023    4) Outpatient Weekly Labs:    Order the following labs to be drawn on Mondays:   CBC  CMP   CRP      5) Fax Lab Results to Infectious Diseases Provider: Kaila    [facility] Fax Number: [phone]    6) Outpatient Infectious Diseases Follow-up    Follow-up appointment will be arranged by the ID clinic and will be found in the patient's appointments tab.    Prior to discharge, please ensure the patient's follow-up has been scheduled.    If there is still no follow-up scheduled prior to discharge, please send an EPIC message to Bess Mullins in Infectious Diseases.

## 2023-11-01 PROBLEM — K86.89 FLUID COLLECTION OF PANCREAS: Status: ACTIVE | Noted: 2023-09-30

## 2023-11-01 LAB
ALBUMIN SERPL BCP-MCNC: 2.6 G/DL (ref 3.5–5.2)
ALP SERPL-CCNC: 73 U/L (ref 55–135)
ALT SERPL W/O P-5'-P-CCNC: 7 U/L (ref 10–44)
ANION GAP SERPL CALC-SCNC: 14 MMOL/L (ref 8–16)
AST SERPL-CCNC: 14 U/L (ref 10–40)
BASOPHILS # BLD AUTO: 0.02 K/UL (ref 0–0.2)
BASOPHILS NFR BLD: 0.5 % (ref 0–1.9)
BILIRUB SERPL-MCNC: 0.3 MG/DL (ref 0.1–1)
BUN SERPL-MCNC: 5 MG/DL (ref 6–20)
CALCIUM SERPL-MCNC: 8.9 MG/DL (ref 8.7–10.5)
CHLORIDE SERPL-SCNC: 104 MMOL/L (ref 95–110)
CO2 SERPL-SCNC: 21 MMOL/L (ref 23–29)
CREAT SERPL-MCNC: 0.6 MG/DL (ref 0.5–1.4)
DIFFERENTIAL METHOD: ABNORMAL
EOSINOPHIL # BLD AUTO: 0.4 K/UL (ref 0–0.5)
EOSINOPHIL NFR BLD: 10.2 % (ref 0–8)
ERYTHROCYTE [DISTWIDTH] IN BLOOD BY AUTOMATED COUNT: 18.1 % (ref 11.5–14.5)
EST. GFR  (NO RACE VARIABLE): >60 ML/MIN/1.73 M^2
GLUCOSE SERPL-MCNC: 69 MG/DL (ref 70–110)
HCT VFR BLD AUTO: 28.1 % (ref 40–54)
HGB BLD-MCNC: 8.6 G/DL (ref 14–18)
IMM GRANULOCYTES # BLD AUTO: 0.01 K/UL (ref 0–0.04)
IMM GRANULOCYTES NFR BLD AUTO: 0.2 % (ref 0–0.5)
LYMPHOCYTES # BLD AUTO: 1.9 K/UL (ref 1–4.8)
LYMPHOCYTES NFR BLD: 44.4 % (ref 18–48)
MAGNESIUM SERPL-MCNC: 1.8 MG/DL (ref 1.6–2.6)
MCH RBC QN AUTO: 27.7 PG (ref 27–31)
MCHC RBC AUTO-ENTMCNC: 30.6 G/DL (ref 32–36)
MCV RBC AUTO: 90 FL (ref 82–98)
MONOCYTES # BLD AUTO: 0.4 K/UL (ref 0.3–1)
MONOCYTES NFR BLD: 8.5 % (ref 4–15)
NEUTROPHILS # BLD AUTO: 1.5 K/UL (ref 1.8–7.7)
NEUTROPHILS NFR BLD: 36.2 % (ref 38–73)
NRBC BLD-RTO: 0 /100 WBC
PHOSPHATE SERPL-MCNC: 4.5 MG/DL (ref 2.7–4.5)
PLATELET # BLD AUTO: 249 K/UL (ref 150–450)
PMV BLD AUTO: 11.1 FL (ref 9.2–12.9)
POTASSIUM SERPL-SCNC: 3.9 MMOL/L (ref 3.5–5.1)
PROT SERPL-MCNC: 6.2 G/DL (ref 6–8.4)
RBC # BLD AUTO: 3.11 M/UL (ref 4.6–6.2)
SODIUM SERPL-SCNC: 139 MMOL/L (ref 136–145)
WBC # BLD AUTO: 4.23 K/UL (ref 3.9–12.7)

## 2023-11-01 PROCEDURE — 63600175 PHARM REV CODE 636 W HCPCS: Performed by: STUDENT IN AN ORGANIZED HEALTH CARE EDUCATION/TRAINING PROGRAM

## 2023-11-01 PROCEDURE — 36415 COLL VENOUS BLD VENIPUNCTURE: CPT

## 2023-11-01 PROCEDURE — 25000003 PHARM REV CODE 250

## 2023-11-01 PROCEDURE — 99223 PR INITIAL HOSPITAL CARE,LEVL III: ICD-10-PCS | Mod: ,,, | Performed by: PHYSICIAN ASSISTANT

## 2023-11-01 PROCEDURE — 99233 PR SUBSEQUENT HOSPITAL CARE,LEVL III: ICD-10-PCS | Mod: ,,, | Performed by: HOSPITALIST

## 2023-11-01 PROCEDURE — 99233 SBSQ HOSP IP/OBS HIGH 50: CPT | Mod: ,,, | Performed by: HOSPITALIST

## 2023-11-01 PROCEDURE — 63600175 PHARM REV CODE 636 W HCPCS

## 2023-11-01 PROCEDURE — 99223 1ST HOSP IP/OBS HIGH 75: CPT | Mod: ,,, | Performed by: PHYSICIAN ASSISTANT

## 2023-11-01 PROCEDURE — 83735 ASSAY OF MAGNESIUM: CPT

## 2023-11-01 PROCEDURE — 85025 COMPLETE CBC W/AUTO DIFF WBC: CPT

## 2023-11-01 PROCEDURE — 84100 ASSAY OF PHOSPHORUS: CPT

## 2023-11-01 PROCEDURE — 25000003 PHARM REV CODE 250: Performed by: INTERNAL MEDICINE

## 2023-11-01 PROCEDURE — 12000002 HC ACUTE/MED SURGE SEMI-PRIVATE ROOM

## 2023-11-01 PROCEDURE — 25000003 PHARM REV CODE 250: Performed by: STUDENT IN AN ORGANIZED HEALTH CARE EDUCATION/TRAINING PROGRAM

## 2023-11-01 PROCEDURE — 80053 COMPREHEN METABOLIC PANEL: CPT

## 2023-11-01 RX ORDER — POLYETHYLENE GLYCOL 3350 17 G/17G
17 POWDER, FOR SOLUTION ORAL 2 TIMES DAILY
Status: DISCONTINUED | OUTPATIENT
Start: 2023-11-01 | End: 2023-11-06

## 2023-11-01 RX ORDER — SODIUM CHLORIDE, SODIUM LACTATE, POTASSIUM CHLORIDE, CALCIUM CHLORIDE 600; 310; 30; 20 MG/100ML; MG/100ML; MG/100ML; MG/100ML
INJECTION, SOLUTION INTRAVENOUS CONTINUOUS
Status: ACTIVE | OUTPATIENT
Start: 2023-11-01 | End: 2023-11-01

## 2023-11-01 RX ORDER — FLUCONAZOLE 200 MG/1
400 TABLET ORAL DAILY
Status: DISCONTINUED | OUTPATIENT
Start: 2023-11-01 | End: 2023-11-10 | Stop reason: HOSPADM

## 2023-11-01 RX ORDER — AMOXICILLIN 250 MG
1 CAPSULE ORAL 2 TIMES DAILY
Status: DISCONTINUED | OUTPATIENT
Start: 2023-11-01 | End: 2023-11-07

## 2023-11-01 RX ADMIN — PANTOPRAZOLE SODIUM 40 MG: 40 TABLET, DELAYED RELEASE ORAL at 09:11

## 2023-11-01 RX ADMIN — SENNOSIDES AND DOCUSATE SODIUM 1 TABLET: 8.6; 5 TABLET ORAL at 02:11

## 2023-11-01 RX ADMIN — PANCRELIPASE 2 CAPSULE: 30000; 6000; 19000 CAPSULE, DELAYED RELEASE PELLETS ORAL at 08:11

## 2023-11-01 RX ADMIN — HYDROXYZINE PAMOATE 25 MG: 25 CAPSULE ORAL at 06:11

## 2023-11-01 RX ADMIN — MEROPENEM 1 G: 1 INJECTION INTRAVENOUS at 01:11

## 2023-11-01 RX ADMIN — ONDANSETRON 4 MG: 2 INJECTION INTRAMUSCULAR; INTRAVENOUS at 08:11

## 2023-11-01 RX ADMIN — GABAPENTIN 600 MG: 300 CAPSULE ORAL at 02:11

## 2023-11-01 RX ADMIN — PANCRELIPASE 2 CAPSULE: 30000; 6000; 19000 CAPSULE, DELAYED RELEASE PELLETS ORAL at 06:11

## 2023-11-01 RX ADMIN — HYDROMORPHONE HYDROCHLORIDE 2 MG: 2 INJECTION, SOLUTION INTRAMUSCULAR; INTRAVENOUS; SUBCUTANEOUS at 09:11

## 2023-11-01 RX ADMIN — PROMETHAZINE HYDROCHLORIDE 6.25 MG: 25 INJECTION INTRAMUSCULAR; INTRAVENOUS at 10:11

## 2023-11-01 RX ADMIN — GABAPENTIN 600 MG: 300 CAPSULE ORAL at 08:11

## 2023-11-01 RX ADMIN — HYDROXYZINE PAMOATE 25 MG: 25 CAPSULE ORAL at 08:11

## 2023-11-01 RX ADMIN — Medication 6 MG: at 09:11

## 2023-11-01 RX ADMIN — PROMETHAZINE HYDROCHLORIDE 6.25 MG: 25 INJECTION INTRAMUSCULAR; INTRAVENOUS at 11:11

## 2023-11-01 RX ADMIN — HYDROMORPHONE HYDROCHLORIDE 2 MG: 2 INJECTION, SOLUTION INTRAMUSCULAR; INTRAVENOUS; SUBCUTANEOUS at 11:11

## 2023-11-01 RX ADMIN — MEROPENEM 1 G: 1 INJECTION INTRAVENOUS at 10:11

## 2023-11-01 RX ADMIN — APIXABAN 5 MG: 5 TABLET, FILM COATED ORAL at 09:11

## 2023-11-01 RX ADMIN — ONDANSETRON 4 MG: 2 INJECTION INTRAMUSCULAR; INTRAVENOUS at 06:11

## 2023-11-01 RX ADMIN — PANTOPRAZOLE SODIUM 40 MG: 40 TABLET, DELAYED RELEASE ORAL at 08:11

## 2023-11-01 RX ADMIN — ACETAMINOPHEN 1000 MG: 500 TABLET ORAL at 06:11

## 2023-11-01 RX ADMIN — ACETAMINOPHEN 1000 MG: 500 TABLET ORAL at 09:11

## 2023-11-01 RX ADMIN — SENNOSIDES AND DOCUSATE SODIUM 1 TABLET: 8.6; 5 TABLET ORAL at 09:11

## 2023-11-01 RX ADMIN — HYDROMORPHONE HYDROCHLORIDE 2 MG: 2 INJECTION, SOLUTION INTRAMUSCULAR; INTRAVENOUS; SUBCUTANEOUS at 02:11

## 2023-11-01 RX ADMIN — MEROPENEM 1 G: 1 INJECTION INTRAVENOUS at 06:11

## 2023-11-01 RX ADMIN — BICTEGRAVIR SODIUM, EMTRICITABINE, AND TENOFOVIR ALAFENAMIDE FUMARATE 1 TABLET: 50; 200; 25 TABLET ORAL at 09:11

## 2023-11-01 RX ADMIN — PANCRELIPASE 2 CAPSULE: 30000; 6000; 19000 CAPSULE, DELAYED RELEASE PELLETS ORAL at 11:11

## 2023-11-01 RX ADMIN — MORPHINE SULFATE 30 MG: 30 TABLET, FILM COATED, EXTENDED RELEASE ORAL at 08:11

## 2023-11-01 RX ADMIN — GABAPENTIN 600 MG: 300 CAPSULE ORAL at 09:11

## 2023-11-01 RX ADMIN — HYDROMORPHONE HYDROCHLORIDE 2 MG: 2 INJECTION, SOLUTION INTRAMUSCULAR; INTRAVENOUS; SUBCUTANEOUS at 01:11

## 2023-11-01 RX ADMIN — FOLIC ACID 1 MG: 1 TABLET ORAL at 08:11

## 2023-11-01 RX ADMIN — HYDROMORPHONE HYDROCHLORIDE 2 MG: 2 INJECTION, SOLUTION INTRAMUSCULAR; INTRAVENOUS; SUBCUTANEOUS at 04:11

## 2023-11-01 RX ADMIN — HYDROMORPHONE HYDROCHLORIDE 2 MG: 2 INJECTION, SOLUTION INTRAMUSCULAR; INTRAVENOUS; SUBCUTANEOUS at 08:11

## 2023-11-01 RX ADMIN — APIXABAN 5 MG: 5 TABLET, FILM COATED ORAL at 08:11

## 2023-11-01 RX ADMIN — SODIUM CHLORIDE, POTASSIUM CHLORIDE, SODIUM LACTATE AND CALCIUM CHLORIDE: 600; 310; 30; 20 INJECTION, SOLUTION INTRAVENOUS at 08:11

## 2023-11-01 RX ADMIN — FLUCONAZOLE 400 MG: 200 TABLET ORAL at 02:11

## 2023-11-01 RX ADMIN — MORPHINE SULFATE 30 MG: 30 TABLET, FILM COATED, EXTENDED RELEASE ORAL at 09:11

## 2023-11-01 RX ADMIN — HYDROMORPHONE HYDROCHLORIDE 2 MG: 2 INJECTION, SOLUTION INTRAMUSCULAR; INTRAVENOUS; SUBCUTANEOUS at 06:11

## 2023-11-01 NOTE — PROGRESS NOTES
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 He has been in and out of the hospital for the last two weeks. His most recent discharge was on10/19 after admission for recurrent symptoms of his acute necrotizing pancreatitis. He was discharged home on MSIR and Morphine which was working well until yesterday morning (10/21) when he started having increased abdominal pain w/nausea. He attempted to eat something but vomited. He denies any recent alcohol or tobacco use. He reports eating normally since discharge. Last meal was a grilled cheese yesterday for dinner. Last BM was loose and "oily" but normal color w/out blood present. He was evaluated by AES on his 10/5 admission who recommended against any intervention on the pancreatic collection due to immaturity. He has been on IV Cefepime since 10/5 with possible end date 11/2.     In the ED, he was afebrile and tachycardic to the 130s. Labs were notable for K 3.3, Bicarb 21, BUN 5, Alb 3.0, Lipase 96. Lactate WNL. Repeat CT A/P showed small R pleural effusion but no acute intra-abdominopelvic abnormality. Peripancreatic fluid collection at the pancreatic tail stable from previous imaging. He was given Dilaudid 2mg x1, Dilaudid 1mg x2 for pain and his evening dose of Cefepime. Hospital Medicine was called for admission of     Overview/Hospital Course:  Admitted to hospital medicine. Cefepime and MS contin continued, with " "addition of scheduled tylenol + toradol and then breakthrough dilaudid. AES consulted again now that imaging findings were stable >4w since initial diagnosis and persistent symptoms. Underwent EUS with findings of stable fluid collection which was not drained ("Complex collections along the neck, body, and tail of pancreas (body/tail region likely connected). Not suitable for endoscopic transmural stenting/drainage.") He will need follow-up CT scan in 4-6 weeks. Cefepime to complete 11/2/23. Patient with increasing pain and inadequate pain control despite dilaudid 2mg q4h w/ MS Contin 15mg bid and multimodal pain medication. A repeat CT Abd/Pelvis on 10/29 showed similar prior fluid collection with new fluid along the posterior aspect of the liver causing IVC compression. Patients case discussed with both IR and AES, both of whom do not feel he is a good candidate for drainage, either percutaneously/transhepatic/transpleural or endoscopically. ID consulted and antibiotics broadened to Meropenem + Diflucan on 10/30 after fevers on evening of 10/29. MS Contin increased to 30 mg BID.  Re-consulted IR for aspiration of older collections with plans for drainage attempt on 11/2.        Interval History: Afebrile since Sunday evening. Tolerate CLD well yesterday without N/V/diarrhea; advanced to full diet this morning without issue after eating a full Maori toast breakfast. Continued abdominal pain with similar characteristics as yesterday. Re consulted IR for drainage of older collections.       Review of Systems   Constitutional:  Negative for appetite change, chills, fatigue and fever.   HENT:  Negative for congestion and sore throat.    Respiratory:  Negative for cough and shortness of breath.    Cardiovascular:  Negative for chest pain.   Gastrointestinal:  Positive for abdominal pain and nausea. Negative for constipation, diarrhea and vomiting.   Genitourinary:  Negative for dysuria and flank pain. "   Musculoskeletal:  Positive for back pain. Negative for joint swelling.   Neurological:  Negative for dizziness and headaches.   Psychiatric/Behavioral:  Negative for confusion. The patient is not nervous/anxious.      Objective:     Vital Signs (Most Recent):  Temp: 98 °F (36.7 °C) (11/01/23 0904)  Pulse: 79 (11/01/23 0904)  Resp: 20 (11/01/23 1105)  BP: 128/75 (11/01/23 0904)  SpO2: (!) 94 % (11/01/23 0904) Vital Signs (24h Range):  Temp:  [97.9 °F (36.6 °C)-99 °F (37.2 °C)] 98 °F (36.7 °C)  Pulse:  [78-90] 79  Resp:  [18-20] 20  SpO2:  [90 %-97 %] 94 %  BP: (116-128)/(75-81) 128/75     Weight: 68 kg (150 lb)  Body mass index is 21.52 kg/m².  No intake or output data in the 24 hours ending 11/01/23 1110        Physical Exam  Vitals and nursing note reviewed.   Constitutional:       Appearance: Normal appearance.      Comments: Lying in bed; drowsy but resting comfortably   HENT:      Head: Normocephalic and atraumatic.      Nose: Nose normal.      Mouth/Throat:      Mouth: Mucous membranes are moist.   Eyes:      General: No scleral icterus.     Pupils: Pupils are equal, round, and reactive to light.   Cardiovascular:      Rate and Rhythm: Normal rate and regular rhythm.      Pulses: Normal pulses.      Heart sounds: Normal heart sounds.   Pulmonary:      Effort: Pulmonary effort is normal.      Breath sounds: Normal breath sounds.   Abdominal:      General: Abdomen is flat. Bowel sounds are normal. There is no distension.      Palpations: Abdomen is soft. There is no mass.      Tenderness: There is abdominal tenderness (upper quadrants). There is no right CVA tenderness, left CVA tenderness, guarding or rebound.   Musculoskeletal:         General: No swelling or tenderness.      Cervical back: Normal range of motion.   Skin:     General: Skin is warm.      Coloration: Skin is not jaundiced.   Neurological:      General: No focal deficit present.      Mental Status: He is alert and oriented to person, place, and  "time. Mental status is at baseline.   Psychiatric:         Mood and Affect: Mood normal.             Significant Labs: All pertinent labs within the past 24 hours have been reviewed.    Significant Imaging: I have reviewed all pertinent imaging results/findings within the past 24 hours.    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 Lipase mildly elevated. WBC and lactate WNL. On IV Cefepime for 4wk course. Discharged on MSIR and Morphine for pain control. EUS (10/23/23) "complex collections along the neck, body, and tail of pancreas. Not suitable for endoscopic transmural stenting/drainage." Scheduled for follow up CT in 4-6 weeks. A repeat CT was obtained on 10/29 due to worsening abdominal pain despite high opioid doses, showing new fluid collection pressing on IVC. Discussed with AES, who will not be able to drain new collection endoscopically, and IR, who does not have a good sampling window and is reluctant to use transhepatic/transpleural methods 2/2 creating infectious tract. General surgery consulted though no plans for acute intervention. Abx broadened from cefepime to meropenem + diflucan after fevering on 10/29; ID consulted. Bcx's (10/30) remain NGTD.     - resume low fat diet today  - continue creon  - Meropenem and Diflucan started (10/30 - 11/13) per ID recs  - f/u new blood cultures from 10/30  - Plan for IR aspiration on 11/2: fluid studies ordered, and plan to confirm other needed studies with ID and GI  - pain control - Dilaudid 2 mg Q3H PRN; scheduled Tylenol, gabapentin 600 mg TID; MS contin 30 mg BID           Moderate malnutrition  Nutrition consulted. Most recent weight and BMI monitored-     Measurements:  Wt Readings from Last 1 Encounters:   10/26/23 " "68 kg (150 lb)   Body mass index is 21.52 kg/m².    Patient has been screened and assessed by RD.    Malnutrition Type:  Context: chronic illness  Level:      Malnutrition Characteristic Summary:  Weight Loss (Malnutrition): 5% in 1 month  Energy Intake (Malnutrition): less than 75% for greater than or equal to 1 month    Interventions/Recommendations (treatment strategy):  1. When medically able, ADAT Regular diet  2. Add Boost Breeze TID. Modify to Boost Plus (vanilla) when diet advanced.  3. RD to monitor and follow      Necrotizing pancreatitis  Plan as above      Sepsis  This patient does have evidence of infective focus  My overall impression is sepsis.  Source: Abdominal  Antibiotics given-   Antibiotics (72h ago, onward)      Start     Stop Route Frequency Ordered    11/01/23 1000  meropenem (MERREM) 1 g in sodium chloride 0.9 % 100 mL IVPB (MB+)         11/13/23 0159 IV Every 8 hours (non-standard times) 11/01/23 0855          Latest lactate reviewed-  No results for input(s): "LACTATE" in the last 72 hours.    Fluid challenge Not needed - patient is not hypotensive      Post- resuscitation assessment No - Post resuscitation assessment not needed       Will Not start Pressors- Levophed for MAP of 65  Source control achieved by: Meropenem, Diflucan    Therapeutic opioid-induced constipation (OIC)  Patient with extended course of high-potency opioids since being hospitalized multiple times for pancreatitis, starting on 9/17/23. KUB (10/27/23) notable for high-stool burden, though without focal dilatation, transition points, or free air. Patient with increased abdominal pain that is different in character and location from description of pain on admission, now including dull, achey pain in belt like distribution with intermittent nausea. Interval CT abdomen (10/30) with decreased stool burden following lactulose multiple times daily.     - Will resume bowel regimen of pericolace BID and miralax BID with " "initiation of solid diet   - can add lactulose/enemas if needed    Pancreatic pseudocyst/cyst  Plan as above    Hypokalemia  K 3.3 on admission; likely 2/2 to decreased PO intake    - Gave IV K 10meq Q1H for 6 doses   - Replete K PRN       Continuous severe abdominal pain  Likely 2/2 to pancreatitis; Discharged home on Morphine taper which was working well until yesterday morning    - See Chronic Pancreatitis       Alcohol use disorder  Hx of EtOH use disorder. On most recent admission, patient's ethanol negative in setting of pancreatitis flare.   - monitor for signs of withdrawal, though patient denies EtOH use in between recent admissions      Fluid collection of pancreas  Plan as above  - IR to aspirate on 11/2      Splenic vein thrombosis  Splenic vein thrombus originally seen on 9/20 imaging, and persistent on most recent CT abdominal imaging. Started on DOAC at last admission, loading dose completed.     - Continue eliquis 5 mg BID      Mild intermittent asthma without complication  History of asthma requiring PRN albuterol. Has not recently been using any inhaler treatment.     Normocytic anemia  Admit with hemoglobin 10.2; Baseline ~ 10    Lab Results   Component Value Date    HGB 8.6 (L) 11/01/2023     - Daily CBC   - Transfuse hgb <7       Polycythemia vera  Hgb 10.5 on admission. Follows up with hematology as outpatient.   - continue to monitor daily CBC      HIV infection  This patient in known to have HIV+ status (Have detected HIV PCR but never CD4 <200 or AIDS defining illness). Labs reviewed- No results found for: "CD4", No results found for: "HIVDNAPCR". Patient is on HAART. Will continue HAART. Continue to monitor routine labs. Last CD4 count was   Lab Results   Component Value Date    ABSOLUTECD4 1080 10/12/2023     - Continue home Biktarvy         VTE Risk Mitigation (From admission, onward)           Ordered     apixaban tablet 5 mg  2 times daily         10/24/23 0744              "       Discharge Planning   CASTILLO: 11/3/2023     Code Status: Full Code   Is the patient medically ready for discharge?:     Reason for patient still in hospital (select all that apply): Treatment and Consult recommendations  Discharge Plan A: Home with family                  Noah Trinh MD  Department of Hospital Medicine   [facility] - Intensive Care ([place]-16)

## 2023-11-01 NOTE — ASSESSMENT & PLAN NOTE
Admit with hemoglobin 10.2; Baseline ~ 10    Lab Results   Component Value Date    HGB 8.6 (L) 11/01/2023     - Daily CBC   - Transfuse hgb <7

## 2023-11-01 NOTE — ASSESSMENT & PLAN NOTE
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 Lipase mildly elevated. WBC and lactate WNL. On IV Cefepime for 4wk course. Discharged on MSIR and Morphine for pain control. EUS (10/23/23) "complex collections along the neck, body, and tail of pancreas. Not suitable for endoscopic transmural stenting/drainage." Scheduled for follow up CT in 4-6 weeks. A repeat CT was obtained on 10/29 due to worsening abdominal pain despite high opioid doses, showing new fluid collection pressing on IVC. Discussed with AES, who will not be able to drain new collection endoscopically, and IR, who does not have a good sampling window and is reluctant to use transhepatic/transpleural methods 2/2 creating infectious tract. General surgery consulted though no plans for acute intervention. Abx broadened from cefepime to meropenem + diflucan after fevering on 10/29; ID consulted. Bcx's (10/30) remain NGTD.     - resume low fat diet today  - continue creon  - Meropenem and Diflucan started (10/30 - 11/13) per ID recs  - f/u new blood cultures from 10/30  - Plan for IR aspiration on 11/2: fluid studies ordered, and plan to confirm other needed studies with ID and GI  - pain control - Dilaudid 2 mg Q3H PRN; scheduled Tylenol, gabapentin 600 mg TID; MS contin 30 mg BID         "

## 2023-11-01 NOTE — ASSESSMENT & PLAN NOTE
Patient with extended course of high-potency opioids since being hospitalized multiple times for pancreatitis, starting on 9/17/23. KUB (10/27/23) notable for high-stool burden, though without focal dilatation, transition points, or free air. Patient with increased abdominal pain that is different in character and location from description of pain on admission, now including dull, achey pain in belt like distribution with intermittent nausea. Interval CT abdomen (10/30) with decreased stool burden following lactulose multiple times daily.     - Will resume bowel regimen of pericolace BID and miralax BID with initiation of solid diet   - can add lactulose/enemas if needed

## 2023-11-01 NOTE — SUBJECTIVE & OBJECTIVE
Interval History: Afebrile since Sunday evening. Tolerate CLD well yesterday without N/V/diarrhea; advanced to full diet this morning without issue after eating a full English toast breakfast. Continued abdominal pain with similar characteristics as yesterday. Re consulted IR for drainage of older collections.       Review of Systems   Constitutional:  Negative for appetite change, chills, fatigue and fever.   HENT:  Negative for congestion and sore throat.    Respiratory:  Negative for cough and shortness of breath.    Cardiovascular:  Negative for chest pain.   Gastrointestinal:  Positive for abdominal pain and nausea. Negative for constipation, diarrhea and vomiting.   Genitourinary:  Negative for dysuria and flank pain.   Musculoskeletal:  Positive for back pain. Negative for joint swelling.   Neurological:  Negative for dizziness and headaches.   Psychiatric/Behavioral:  Negative for confusion. The patient is not nervous/anxious.      Objective:     Vital Signs (Most Recent):  Temp: 98 °F (36.7 °C) (11/01/23 0904)  Pulse: 79 (11/01/23 0904)  Resp: 20 (11/01/23 1105)  BP: 128/75 (11/01/23 0904)  SpO2: (!) 94 % (11/01/23 0904) Vital Signs (24h Range):  Temp:  [97.9 °F (36.6 °C)-99 °F (37.2 °C)] 98 °F (36.7 °C)  Pulse:  [78-90] 79  Resp:  [18-20] 20  SpO2:  [90 %-97 %] 94 %  BP: (116-128)/(75-81) 128/75     Weight: 68 kg (150 lb)  Body mass index is 21.52 kg/m².  No intake or output data in the 24 hours ending 11/01/23 1110        Physical Exam  Vitals and nursing note reviewed.   Constitutional:       Appearance: Normal appearance.      Comments: Lying in bed; drowsy but resting comfortably   HENT:      Head: Normocephalic and atraumatic.      Nose: Nose normal.      Mouth/Throat:      Mouth: Mucous membranes are moist.   Eyes:      General: No scleral icterus.     Pupils: Pupils are equal, round, and reactive to light.   Cardiovascular:      Rate and Rhythm: Normal rate and regular rhythm.      Pulses: Normal  pulses.      Heart sounds: Normal heart sounds.   Pulmonary:      Effort: Pulmonary effort is normal.      Breath sounds: Normal breath sounds.   Abdominal:      General: Abdomen is flat. Bowel sounds are normal. There is no distension.      Palpations: Abdomen is soft. There is no mass.      Tenderness: There is abdominal tenderness (upper quadrants). There is no right CVA tenderness, left CVA tenderness, guarding or rebound.   Musculoskeletal:         General: No swelling or tenderness.      Cervical back: Normal range of motion.   Skin:     General: Skin is warm.      Coloration: Skin is not jaundiced.   Neurological:      General: No focal deficit present.      Mental Status: He is alert and oriented to person, place, and time. Mental status is at baseline.   Psychiatric:         Mood and Affect: Mood normal.             Significant Labs: All pertinent labs within the past 24 hours have been reviewed.    Significant Imaging: I have reviewed all pertinent imaging results/findings within the past 24 hours.

## 2023-11-01 NOTE — PLAN OF CARE
[facility] - [facility] ([facility]-16)  Discharge Reassessment    Primary Care Provider: Pina Jones NP    Expected Discharge Date: 11/3/2023    Reassessment (most recent)       Discharge Reassessment - 11/01/23 1510          Discharge Reassessment    Assessment Type Discharge Planning Reassessment (P)      Did the patient's condition or plan change since previous assessment? No (P)      Discharge Plan discussed with: Patient (P)      Communicated CASTILLO with patient/caregiver Yes (P)      Discharge Plan A Home with family (P)      Discharge Plan B Home (P)      DME Needed Upon Discharge  none (P)      Transition of Care Barriers None (P)      Why the patient remains in the hospital Requires continued medical care (P)         Post-Acute Status    Coverage MEDICAID - [facility]UN[facility] PLAN [facility] ([place]) (P)                    Discharge Plan A and Plan B have been determined by review of patient's clinical status, future medical and therapeutic needs, and coverage/benefits for post-acute care in coordination with multidisciplinary team members.                     [place], HUBER, LMSW  [facility]  Case Management  Ext. [phone]

## 2023-11-01 NOTE — ASSESSMENT & PLAN NOTE
"This patient does have evidence of infective focus  My overall impression is sepsis.  Source: Abdominal  Antibiotics given-   Antibiotics (72h ago, onward)      Start     Stop Route Frequency Ordered    11/01/23 1000  meropenem (MERREM) 1 g in sodium chloride 0.9 % 100 mL IVPB (MB+)         11/13/23 0159 IV Every 8 hours (non-standard times) 11/01/23 0855          Latest lactate reviewed-  No results for input(s): "LACTATE" in the last 72 hours.    Fluid challenge Not needed - patient is not hypotensive      Post- resuscitation assessment No - Post resuscitation assessment not needed       Will Not start Pressors- Levophed for MAP of 65  Source control achieved by: Meropenem, Diflucan  "

## 2023-11-01 NOTE — ASSESSMENT & PLAN NOTE
Nutrition consulted. Most recent weight and BMI monitored-     Measurements:  Wt Readings from Last 1 Encounters:   10/26/23 68 kg (150 lb)   Body mass index is 21.52 kg/m².    Patient has been screened and assessed by RD.    Malnutrition Type:  Context: chronic illness  Level:      Malnutrition Characteristic Summary:  Weight Loss (Malnutrition): 5% in 1 month  Energy Intake (Malnutrition): less than 75% for greater than or equal to 1 month    Interventions/Recommendations (treatment strategy):  1. When medically able, ADAT Regular diet  2. Add Boost Breeze TID. Modify to Boost Plus (vanilla) when diet advanced.  3. RD to monitor and follow

## 2023-11-02 LAB
ALBUMIN SERPL BCP-MCNC: 2.7 G/DL (ref 3.5–5.2)
ALP SERPL-CCNC: 73 U/L (ref 55–135)
ALT SERPL W/O P-5'-P-CCNC: 7 U/L (ref 10–44)
ANION GAP SERPL CALC-SCNC: 13 MMOL/L (ref 8–16)
AST SERPL-CCNC: 13 U/L (ref 10–40)
BASOPHILS # BLD AUTO: 0.02 K/UL (ref 0–0.2)
BASOPHILS NFR BLD: 0.4 % (ref 0–1.9)
BILIRUB SERPL-MCNC: 0.1 MG/DL (ref 0.1–1)
BUN SERPL-MCNC: 5 MG/DL (ref 6–20)
CALCIUM SERPL-MCNC: 9.3 MG/DL (ref 8.7–10.5)
CHLORIDE SERPL-SCNC: 103 MMOL/L (ref 95–110)
CO2 SERPL-SCNC: 24 MMOL/L (ref 23–29)
CREAT SERPL-MCNC: 0.6 MG/DL (ref 0.5–1.4)
DIFFERENTIAL METHOD: ABNORMAL
EOSINOPHIL # BLD AUTO: 0.4 K/UL (ref 0–0.5)
EOSINOPHIL NFR BLD: 8.8 % (ref 0–8)
ERYTHROCYTE [DISTWIDTH] IN BLOOD BY AUTOMATED COUNT: 18.1 % (ref 11.5–14.5)
EST. GFR  (NO RACE VARIABLE): >60 ML/MIN/1.73 M^2
GLUCOSE FLD-MCNC: 81 MG/DL
GLUCOSE SERPL-MCNC: 100 MG/DL (ref 70–110)
GRAM STN SPEC: NORMAL
GRAM STN SPEC: NORMAL
HCT VFR BLD AUTO: 27.7 % (ref 40–54)
HGB BLD-MCNC: 8.5 G/DL (ref 14–18)
IMM GRANULOCYTES # BLD AUTO: 0.01 K/UL (ref 0–0.04)
IMM GRANULOCYTES NFR BLD AUTO: 0.2 % (ref 0–0.5)
LYMPHOCYTES # BLD AUTO: 1.9 K/UL (ref 1–4.8)
LYMPHOCYTES NFR BLD: 39.7 % (ref 18–48)
MAGNESIUM SERPL-MCNC: 1.8 MG/DL (ref 1.6–2.6)
MCH RBC QN AUTO: 27.2 PG (ref 27–31)
MCHC RBC AUTO-ENTMCNC: 30.7 G/DL (ref 32–36)
MCV RBC AUTO: 89 FL (ref 82–98)
MONOCYTES # BLD AUTO: 0.5 K/UL (ref 0.3–1)
MONOCYTES NFR BLD: 9.3 % (ref 4–15)
NEUTROPHILS # BLD AUTO: 2 K/UL (ref 1.8–7.7)
NEUTROPHILS NFR BLD: 41.6 % (ref 38–73)
NRBC BLD-RTO: 0 /100 WBC
PHOSPHATE SERPL-MCNC: 4.5 MG/DL (ref 2.7–4.5)
PLATELET # BLD AUTO: 283 K/UL (ref 150–450)
PMV BLD AUTO: 10.1 FL (ref 9.2–12.9)
POTASSIUM SERPL-SCNC: 4 MMOL/L (ref 3.5–5.1)
PROT SERPL-MCNC: 6.6 G/DL (ref 6–8.4)
RBC # BLD AUTO: 3.13 M/UL (ref 4.6–6.2)
SODIUM SERPL-SCNC: 140 MMOL/L (ref 136–145)
SPECIMEN SOURCE: NORMAL
WBC # BLD AUTO: 4.86 K/UL (ref 3.9–12.7)

## 2023-11-02 PROCEDURE — 87075 CULTR BACTERIA EXCEPT BLOOD: CPT

## 2023-11-02 PROCEDURE — 25000003 PHARM REV CODE 250: Performed by: INTERNAL MEDICINE

## 2023-11-02 PROCEDURE — 63600175 PHARM REV CODE 636 W HCPCS: Performed by: STUDENT IN AN ORGANIZED HEALTH CARE EDUCATION/TRAINING PROGRAM

## 2023-11-02 PROCEDURE — 87205 SMEAR GRAM STAIN: CPT

## 2023-11-02 PROCEDURE — 25000003 PHARM REV CODE 250

## 2023-11-02 PROCEDURE — 63600175 PHARM REV CODE 636 W HCPCS

## 2023-11-02 PROCEDURE — 87070 CULTURE OTHR SPECIMN AEROBIC: CPT

## 2023-11-02 PROCEDURE — 80053 COMPREHEN METABOLIC PANEL: CPT

## 2023-11-02 PROCEDURE — 85025 COMPLETE CBC W/AUTO DIFF WBC: CPT

## 2023-11-02 PROCEDURE — 84100 ASSAY OF PHOSPHORUS: CPT

## 2023-11-02 PROCEDURE — 99233 SBSQ HOSP IP/OBS HIGH 50: CPT | Mod: ,,, | Performed by: HOSPITALIST

## 2023-11-02 PROCEDURE — 99233 PR SUBSEQUENT HOSPITAL CARE,LEVL III: ICD-10-PCS | Mod: ,,, | Performed by: HOSPITALIST

## 2023-11-02 PROCEDURE — 83735 ASSAY OF MAGNESIUM: CPT

## 2023-11-02 PROCEDURE — 87102 FUNGUS ISOLATION CULTURE: CPT

## 2023-11-02 PROCEDURE — 82378 CARCINOEMBRYONIC ANTIGEN: CPT

## 2023-11-02 PROCEDURE — 82945 GLUCOSE OTHER FLUID: CPT

## 2023-11-02 PROCEDURE — 36415 COLL VENOUS BLD VENIPUNCTURE: CPT

## 2023-11-02 PROCEDURE — 25000003 PHARM REV CODE 250: Performed by: STUDENT IN AN ORGANIZED HEALTH CARE EDUCATION/TRAINING PROGRAM

## 2023-11-02 PROCEDURE — 82150 ASSAY OF AMYLASE: CPT

## 2023-11-02 PROCEDURE — 12000002 HC ACUTE/MED SURGE SEMI-PRIVATE ROOM

## 2023-11-02 RX ORDER — FENTANYL CITRATE 50 UG/ML
INJECTION, SOLUTION INTRAMUSCULAR; INTRAVENOUS
Status: COMPLETED | OUTPATIENT
Start: 2023-11-02 | End: 2023-11-02

## 2023-11-02 RX ORDER — MIDAZOLAM HYDROCHLORIDE 1 MG/ML
INJECTION INTRAMUSCULAR; INTRAVENOUS
Status: COMPLETED | OUTPATIENT
Start: 2023-11-02 | End: 2023-11-02

## 2023-11-02 RX ADMIN — PANTOPRAZOLE SODIUM 40 MG: 40 TABLET, DELAYED RELEASE ORAL at 08:11

## 2023-11-02 RX ADMIN — ACETAMINOPHEN 1000 MG: 500 TABLET ORAL at 08:11

## 2023-11-02 RX ADMIN — HYDROMORPHONE HYDROCHLORIDE 2 MG: 2 INJECTION, SOLUTION INTRAMUSCULAR; INTRAVENOUS; SUBCUTANEOUS at 03:11

## 2023-11-02 RX ADMIN — MEROPENEM 1 G: 1 INJECTION INTRAVENOUS at 06:11

## 2023-11-02 RX ADMIN — HYDROMORPHONE HYDROCHLORIDE 2 MG: 2 INJECTION, SOLUTION INTRAMUSCULAR; INTRAVENOUS; SUBCUTANEOUS at 08:11

## 2023-11-02 RX ADMIN — MIDAZOLAM HYDROCHLORIDE 2 MG: 2 INJECTION, SOLUTION INTRAMUSCULAR; INTRAVENOUS at 04:11

## 2023-11-02 RX ADMIN — HYDROMORPHONE HYDROCHLORIDE 2 MG: 2 INJECTION, SOLUTION INTRAMUSCULAR; INTRAVENOUS; SUBCUTANEOUS at 11:11

## 2023-11-02 RX ADMIN — HYDROMORPHONE HYDROCHLORIDE 2 MG: 2 INJECTION, SOLUTION INTRAMUSCULAR; INTRAVENOUS; SUBCUTANEOUS at 05:11

## 2023-11-02 RX ADMIN — HYDROXYZINE PAMOATE 25 MG: 25 CAPSULE ORAL at 06:11

## 2023-11-02 RX ADMIN — MORPHINE SULFATE 30 MG: 30 TABLET, FILM COATED, EXTENDED RELEASE ORAL at 09:11

## 2023-11-02 RX ADMIN — MEROPENEM 1 G: 1 INJECTION INTRAVENOUS at 03:11

## 2023-11-02 RX ADMIN — GABAPENTIN 600 MG: 300 CAPSULE ORAL at 08:11

## 2023-11-02 RX ADMIN — APIXABAN 5 MG: 5 TABLET, FILM COATED ORAL at 08:11

## 2023-11-02 RX ADMIN — HYDROMORPHONE HYDROCHLORIDE 2 MG: 2 INJECTION, SOLUTION INTRAMUSCULAR; INTRAVENOUS; SUBCUTANEOUS at 01:11

## 2023-11-02 RX ADMIN — ACETAMINOPHEN 1000 MG: 500 TABLET ORAL at 01:11

## 2023-11-02 RX ADMIN — BICTEGRAVIR SODIUM, EMTRICITABINE, AND TENOFOVIR ALAFENAMIDE FUMARATE 1 TABLET: 50; 200; 25 TABLET ORAL at 09:11

## 2023-11-02 RX ADMIN — MORPHINE SULFATE 30 MG: 30 TABLET, FILM COATED, EXTENDED RELEASE ORAL at 08:11

## 2023-11-02 RX ADMIN — ACETAMINOPHEN 1000 MG: 500 TABLET ORAL at 05:11

## 2023-11-02 RX ADMIN — HYDROMORPHONE HYDROCHLORIDE 2 MG: 2 INJECTION, SOLUTION INTRAMUSCULAR; INTRAVENOUS; SUBCUTANEOUS at 06:11

## 2023-11-02 RX ADMIN — PANCRELIPASE 2 CAPSULE: 30000; 6000; 19000 CAPSULE, DELAYED RELEASE PELLETS ORAL at 06:11

## 2023-11-02 RX ADMIN — ONDANSETRON 4 MG: 2 INJECTION INTRAMUSCULAR; INTRAVENOUS at 06:11

## 2023-11-02 RX ADMIN — GABAPENTIN 600 MG: 300 CAPSULE ORAL at 09:11

## 2023-11-02 RX ADMIN — HYDROMORPHONE HYDROCHLORIDE 2 MG: 2 INJECTION, SOLUTION INTRAMUSCULAR; INTRAVENOUS; SUBCUTANEOUS at 12:11

## 2023-11-02 RX ADMIN — FENTANYL CITRATE 200 MCG: 50 INJECTION, SOLUTION INTRAMUSCULAR; INTRAVENOUS at 04:11

## 2023-11-02 RX ADMIN — HYDROXYZINE PAMOATE 25 MG: 25 CAPSULE ORAL at 09:11

## 2023-11-02 RX ADMIN — MEROPENEM 1 G: 1 INJECTION INTRAVENOUS at 11:11

## 2023-11-02 NOTE — ASSESSMENT & PLAN NOTE
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 Lipase mildly elevated. WBC and lactate WNL. On IV Cefepime for 4wk course. Discharged on MSIR and Morphine for pain control. EUS (10/23/23) "complex collections along the neck, body, and tail of pancreas. Not suitable for endoscopic transmural stenting/drainage." Scheduled for follow up CT in 4-6 weeks. A repeat CT was obtained on 10/29 due to worsening abdominal pain despite high opioid doses, showing new fluid collection pressing on IVC. Discussed with AES, who will not be able to drain new collection endoscopically, and IR, who does not have a good sampling window and is reluctant to use transhepatic/transpleural methods 2/2 creating infectious tract. General surgery consulted though no plans for acute intervention. Abx broadened from cefepime to meropenem + diflucan after fevering on 10/29; ID consulted. Bcx's (10/30) remain NGTD. IR procedure delayed 2/2 CT scanner malfunction.     - NPO today for procedure  - Plan for IR aspiration on 11/3: f/u gram stain, cultures, amylase, other labs  - continue creon  - Meropenem and Diflucan started (10/30 - 11/13) per ID recs  - pain control - Dilaudid 2 mg Q3H PRN; scheduled Tylenol, gabapentin 600 mg TID; MS contin 30 mg BID         "

## 2023-11-02 NOTE — SUBJECTIVE & OBJECTIVE
Interval History: Continues to be afebrile. Able to tolerate full diet yesterday. Has been NPO this AM for IR procedure today, though procedure delayed after CT scan machine malfunction.     Abdominal pain continues despite high doses of pain meds.      Review of Systems   Constitutional:  Negative for appetite change, chills, fatigue and fever.   HENT:  Negative for congestion and sore throat.    Respiratory:  Negative for cough and shortness of breath.    Cardiovascular:  Negative for chest pain.   Gastrointestinal:  Positive for abdominal pain and nausea. Negative for constipation, diarrhea and vomiting.   Genitourinary:  Negative for dysuria and flank pain.   Musculoskeletal:  Positive for back pain. Negative for joint swelling.   Neurological:  Negative for dizziness and headaches.   Psychiatric/Behavioral:  Negative for confusion. The patient is not nervous/anxious.      Objective:     Vital Signs (Most Recent):  Temp: 99 °F (37.2 °C) (11/02/23 0420)  Pulse: 76 (11/02/23 0420)  Resp: 17 (11/02/23 0555)  BP: 117/78 (11/02/23 0420)  SpO2: 98 % (11/02/23 0420) Vital Signs (24h Range):  Temp:  [98 °F (36.7 °C)-99.4 °F (37.4 °C)] 99 °F (37.2 °C)  Pulse:  [76-85] 76  Resp:  [17-20] 17  SpO2:  [92 %-98 %] 98 %  BP: (116-130)/(75-82) 117/78     Weight: 68 kg (150 lb)  Body mass index is 21.52 kg/m².    Intake/Output Summary (Last 24 hours) at 11/2/2023 0813  Last data filed at 11/2/2023 0707  Gross per 24 hour   Intake --   Output 900 ml   Net -900 ml           Physical Exam  Vitals and nursing note reviewed.   Constitutional:       Appearance: Normal appearance.      Comments: Resting comfortably, watching television   HENT:      Head: Normocephalic and atraumatic.      Nose: Nose normal.      Mouth/Throat:      Mouth: Mucous membranes are moist.   Eyes:      General: No scleral icterus.     Pupils: Pupils are equal, round, and reactive to light.   Cardiovascular:      Rate and Rhythm: Normal rate and regular rhythm.       Pulses: Normal pulses.      Heart sounds: Normal heart sounds.   Pulmonary:      Effort: Pulmonary effort is normal.      Breath sounds: Normal breath sounds.   Abdominal:      General: Abdomen is flat. Bowel sounds are normal. There is no distension.      Palpations: Abdomen is soft. There is no mass.      Tenderness: There is abdominal tenderness (upper quadrants). There is no right CVA tenderness, left CVA tenderness, guarding or rebound.   Musculoskeletal:         General: No swelling or tenderness.      Cervical back: Normal range of motion.   Skin:     General: Skin is warm.      Coloration: Skin is not jaundiced.   Neurological:      General: No focal deficit present.      Mental Status: He is alert and oriented to person, place, and time. Mental status is at baseline.   Psychiatric:         Mood and Affect: Mood normal.             Significant Labs: All pertinent labs within the past 24 hours have been reviewed.    Significant Imaging: I have reviewed all pertinent imaging results/findings within the past 24 hours.

## 2023-11-02 NOTE — PROGRESS NOTES
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 He has been in and out of the hospital for the last two weeks. His most recent discharge was on10/19 after admission for recurrent symptoms of his acute necrotizing pancreatitis. He was discharged home on MSIR and Morphine which was working well until yesterday morning (10/21) when he started having increased abdominal pain w/nausea. He attempted to eat something but vomited. He denies any recent alcohol or tobacco use. He reports eating normally since discharge. Last meal was a grilled cheese yesterday for dinner. Last BM was loose and "oily" but normal color w/out blood present. He was evaluated by AES on his 10/5 admission who recommended against any intervention on the pancreatic collection due to immaturity. He has been on IV Cefepime since 10/5 with possible end date 11/2.     In the ED, he was afebrile and tachycardic to the 130s. Labs were notable for K 3.3, Bicarb 21, BUN 5, Alb 3.0, Lipase 96. Lactate WNL. Repeat CT A/P showed small R pleural effusion but no acute intra-abdominopelvic abnormality. Peripancreatic fluid collection at the pancreatic tail stable from previous imaging. He was given Dilaudid 2mg x1, Dilaudid 1mg x2 for pain and his evening dose of Cefepime. Hospital Medicine was called for admission of     Overview/Hospital Course:  Admitted to hospital medicine. Cefepime and MS contin continued, with " "addition of scheduled tylenol + toradol and then breakthrough dilaudid. AES consulted again now that imaging findings were stable >4w since initial diagnosis and persistent symptoms. Underwent EUS with findings of stable fluid collection which was not drained ("Complex collections along the neck, body, and tail of pancreas (body/tail region likely connected). Not suitable for endoscopic transmural stenting/drainage.") He will need follow-up CT scan in 4-6 weeks. Cefepime to complete 11/2/23. Patient with increasing pain and inadequate pain control despite dilaudid 2mg q4h w/ MS Contin 15mg bid and multimodal pain medication. A repeat CT Abd/Pelvis on 10/29 showed similar prior fluid collection with new fluid along the posterior aspect of the liver causing IVC compression. Patients case discussed with both IR and AES, both of whom do not feel he is a good candidate for drainage, either percutaneously/transhepatic/transpleural or endoscopically. ID consulted and antibiotics broadened to Meropenem + Diflucan on 10/30 after fevers on evening of 10/29. MS Contin increased to 30 mg BID.  Re-consulted IR for aspiration of older collections with plans for drainage attempt on 11/2, though delayed after imaging machine difficulties.         Interval History: Continues to be afebrile. Able to tolerate full diet yesterday. Has been NPO this AM for IR procedure today, though procedure delayed after CT scan machine malfunction.     Abdominal pain continues despite high doses of pain meds.      Review of Systems   Constitutional:  Negative for appetite change, chills, fatigue and fever.   HENT:  Negative for congestion and sore throat.    Respiratory:  Negative for cough and shortness of breath.    Cardiovascular:  Negative for chest pain.   Gastrointestinal:  Positive for abdominal pain and nausea. Negative for constipation, diarrhea and vomiting.   Genitourinary:  Negative for dysuria and flank pain.   Musculoskeletal:  Positive " for back pain. Negative for joint swelling.   Neurological:  Negative for dizziness and headaches.   Psychiatric/Behavioral:  Negative for confusion. The patient is not nervous/anxious.      Objective:     Vital Signs (Most Recent):  Temp: 99 °F (37.2 °C) (11/02/23 0420)  Pulse: 76 (11/02/23 0420)  Resp: 17 (11/02/23 0555)  BP: 117/78 (11/02/23 0420)  SpO2: 98 % (11/02/23 0420) Vital Signs (24h Range):  Temp:  [98 °F (36.7 °C)-99.4 °F (37.4 °C)] 99 °F (37.2 °C)  Pulse:  [76-85] 76  Resp:  [17-20] 17  SpO2:  [92 %-98 %] 98 %  BP: (116-130)/(75-82) 117/78     Weight: 68 kg (150 lb)  Body mass index is 21.52 kg/m².    Intake/Output Summary (Last 24 hours) at 11/2/2023 0813  Last data filed at 11/2/2023 0707  Gross per 24 hour   Intake --   Output 900 ml   Net -900 ml           Physical Exam  Vitals and nursing note reviewed.   Constitutional:       Appearance: Normal appearance.      Comments: Resting comfortably, watching television   HENT:      Head: Normocephalic and atraumatic.      Nose: Nose normal.      Mouth/Throat:      Mouth: Mucous membranes are moist.   Eyes:      General: No scleral icterus.     Pupils: Pupils are equal, round, and reactive to light.   Cardiovascular:      Rate and Rhythm: Normal rate and regular rhythm.      Pulses: Normal pulses.      Heart sounds: Normal heart sounds.   Pulmonary:      Effort: Pulmonary effort is normal.      Breath sounds: Normal breath sounds.   Abdominal:      General: Abdomen is flat. Bowel sounds are normal. There is no distension.      Palpations: Abdomen is soft. There is no mass.      Tenderness: There is abdominal tenderness (upper quadrants). There is no right CVA tenderness, left CVA tenderness, guarding or rebound.   Musculoskeletal:         General: No swelling or tenderness.      Cervical back: Normal range of motion.   Skin:     General: Skin is warm.      Coloration: Skin is not jaundiced.   Neurological:      General: No focal deficit present.       "Mental Status: He is alert and oriented to person, place, and time. Mental status is at baseline.   Psychiatric:         Mood and Affect: Mood normal.             Significant Labs: All pertinent labs within the past 24 hours have been reviewed.    Significant Imaging: I have reviewed all pertinent imaging results/findings within the past 24 hours.    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 Lipase mildly elevated. WBC and lactate WNL. On IV Cefepime for 4wk course. Discharged on MSIR and Morphine for pain control. EUS (10/23/23) "complex collections along the neck, body, and tail of pancreas. Not suitable for endoscopic transmural stenting/drainage." Scheduled for follow up CT in 4-6 weeks. A repeat CT was obtained on 10/29 due to worsening abdominal pain despite high opioid doses, showing new fluid collection pressing on IVC. Discussed with AES, who will not be able to drain new collection endoscopically, and IR, who does not have a good sampling window and is reluctant to use transhepatic/transpleural methods 2/2 creating infectious tract. General surgery consulted though no plans for acute intervention. Abx broadened from cefepime to meropenem + diflucan after fevering on 10/29; ID consulted. Bcx's (10/30) remain NGTD. IR procedure delayed 2/2 CT scanner malfunction.     - NPO today for procedure  - Plan for IR aspiration on 11/3: f/u gram stain, cultures, amylase, other labs  - continue creon  - Meropenem and Diflucan started (10/30 - 11/13) per ID recs  - pain control - Dilaudid 2 mg Q3H PRN; scheduled Tylenol, gabapentin 600 mg TID; MS contin 30 mg BID           Moderate malnutrition  Nutrition consulted. Most recent weight and BMI monitored-     Measurements:  Wt " "Readings from Last 1 Encounters:   10/26/23 68 kg (150 lb)   Body mass index is 21.52 kg/m².    Patient has been screened and assessed by RD.    Malnutrition Type:  Context: chronic illness  Level:      Malnutrition Characteristic Summary:  Weight Loss (Malnutrition): 5% in 1 month  Energy Intake (Malnutrition): less than 75% for greater than or equal to 1 month    Interventions/Recommendations (treatment strategy):  1. When medically able, ADAT Regular diet  2. Add Boost Breeze TID. Modify to Boost Plus (vanilla) when diet advanced.  3. RD to monitor and follow      Necrotizing pancreatitis  Plan as above      Sepsis  This patient does have evidence of infective focus  My overall impression is sepsis.  Source: Abdominal  Antibiotics given-   Antibiotics (72h ago, onward)      Start     Stop Route Frequency Ordered    11/01/23 1000  meropenem (MERREM) 1 g in sodium chloride 0.9 % 100 mL IVPB (MB+)         11/13/23 0159 IV Every 8 hours (non-standard times) 11/01/23 0855          Latest lactate reviewed-  No results for input(s): "LACTATE" in the last 72 hours.    Fluid challenge Not needed - patient is not hypotensive      Post- resuscitation assessment No - Post resuscitation assessment not needed       Will Not start Pressors- Levophed for MAP of 65  Source control achieved by: Meropenem, Diflucan    Therapeutic opioid-induced constipation (OIC)  Patient with extended course of high-potency opioids since being hospitalized multiple times for pancreatitis, starting on 9/17/23. KUB (10/27/23) notable for high-stool burden, though without focal dilatation, transition points, or free air. Patient with increased abdominal pain that is different in character and location from description of pain on admission, now including dull, achey pain in belt like distribution with intermittent nausea. Interval CT abdomen (10/30) with decreased stool burden following lactulose multiple times daily.     - Will resume bowel regimen " "of pericolace BID and miralax BID   - can add lactulose/enemas if needed    Pancreatic pseudocyst/cyst  Plan as above    Hypokalemia  K 3.3 on admission; likely 2/2 to decreased PO intake    - Gave IV K 10meq Q1H for 6 doses   - Replete K PRN       Continuous severe abdominal pain  Likely 2/2 to pancreatitis; Discharged home on Morphine taper which was working well until yesterday morning    - See Chronic Pancreatitis       Alcohol use disorder  Hx of EtOH use disorder. On most recent admission, patient's ethanol negative in setting of pancreatitis flare.   - monitor for signs of withdrawal, though patient denies EtOH use in between recent admissions      Fluid collection of pancreas  Plan as above        Splenic vein thrombosis  Splenic vein thrombus originally seen on 9/20 imaging, and persistent on most recent CT abdominal imaging. Started on DOAC at last admission, loading dose completed.     - Continue eliquis 5 mg BID  - per IR, no need to hold today       Mild intermittent asthma without complication  History of asthma requiring PRN albuterol. Has not recently been using any inhaler treatment.     Normocytic anemia  Admit with hemoglobin 10.2; Baseline ~ 10    Lab Results   Component Value Date    HGB 8.5 (L) 11/02/2023     - Daily CBC   - Transfuse hgb <7       Polycythemia vera  Hgb 10.5 on admission. Follows up with hematology as outpatient.   - continue to monitor daily CBC      HIV infection  This patient in known to have HIV+ status (Have detected HIV PCR but never CD4 <200 or AIDS defining illness). Labs reviewed- No results found for: "CD4", No results found for: "HIVDNAPCR". Patient is on HAART. Will continue HAART. Continue to monitor routine labs. Last CD4 count was   Lab Results   Component Value Date    ABSOLUTECD4 1080 10/12/2023     - Continue home Biktarvy         VTE Risk Mitigation (From admission, onward)           Ordered     apixaban tablet 5 mg  2 times daily         10/24/23 0744      "               Discharge Planning   CASTILLO: 11/3/2023     Code Status: Full Code   Is the patient medically ready for discharge?:     Reason for patient still in hospital (select all that apply): Laboratory test and Treatment  Discharge Plan A: Home with family        Noah Trinh MD  Department of Hospital Medicine   [facility] - Intensive Care ([place])

## 2023-11-02 NOTE — ASSESSMENT & PLAN NOTE
Splenic vein thrombus originally seen on 9/20 imaging, and persistent on most recent CT abdominal imaging. Started on DOAC at last admission, loading dose completed.     - Continue eliquis 5 mg BID  - per IR, no need to hold today

## 2023-11-02 NOTE — H&P
Please see IR consult note dated 11/1/23 for full details    Janet Anderson PA-C  Interventional Radiology  Spectra [phone]  11/2/2023

## 2023-11-02 NOTE — ASSESSMENT & PLAN NOTE
Patient with extended course of high-potency opioids since being hospitalized multiple times for pancreatitis, starting on 9/17/23. KUB (10/27/23) notable for high-stool burden, though without focal dilatation, transition points, or free air. Patient with increased abdominal pain that is different in character and location from description of pain on admission, now including dull, achey pain in belt like distribution with intermittent nausea. Interval CT abdomen (10/30) with decreased stool burden following lactulose multiple times daily.     - Will resume bowel regimen of pericolace BID and miralax BID   - can add lactulose/enemas if needed

## 2023-11-02 NOTE — ASSESSMENT & PLAN NOTE
Admit with hemoglobin 10.2; Baseline ~ 10    Lab Results   Component Value Date    HGB 8.5 (L) 11/02/2023     - Daily CBC   - Transfuse hgb <7

## 2023-11-02 NOTE — SEDATION DOCUMENTATION
Procedure complete. Pt tolerated well with no signs of distress noted. Dressing to site is clean, dry, and intact. Pt will be transferred to [facility] for 1 hour recovery before returning to unit.

## 2023-11-03 LAB
ALBUMIN SERPL BCP-MCNC: 2.5 G/DL (ref 3.5–5.2)
ALP SERPL-CCNC: 67 U/L (ref 55–135)
ALT SERPL W/O P-5'-P-CCNC: 5 U/L (ref 10–44)
ANION GAP SERPL CALC-SCNC: 15 MMOL/L (ref 8–16)
AST SERPL-CCNC: 11 U/L (ref 10–40)
BASOPHILS # BLD AUTO: 0.02 K/UL (ref 0–0.2)
BASOPHILS NFR BLD: 0.3 % (ref 0–1.9)
BILIRUB SERPL-MCNC: 0.3 MG/DL (ref 0.1–1)
BUN SERPL-MCNC: 5 MG/DL (ref 6–20)
CALCIUM SERPL-MCNC: 8.7 MG/DL (ref 8.7–10.5)
CHLORIDE SERPL-SCNC: 103 MMOL/L (ref 95–110)
CO2 SERPL-SCNC: 21 MMOL/L (ref 23–29)
CREAT SERPL-MCNC: 0.6 MG/DL (ref 0.5–1.4)
DIFFERENTIAL METHOD: ABNORMAL
EOSINOPHIL # BLD AUTO: 0.7 K/UL (ref 0–0.5)
EOSINOPHIL NFR BLD: 9.5 % (ref 0–8)
ERYTHROCYTE [DISTWIDTH] IN BLOOD BY AUTOMATED COUNT: 18.3 % (ref 11.5–14.5)
EST. GFR  (NO RACE VARIABLE): >60 ML/MIN/1.73 M^2
GLUCOSE SERPL-MCNC: 86 MG/DL (ref 70–110)
HCT VFR BLD AUTO: 32.1 % (ref 40–54)
HGB BLD-MCNC: 10 G/DL (ref 14–18)
IMM GRANULOCYTES # BLD AUTO: 0.02 K/UL (ref 0–0.04)
IMM GRANULOCYTES NFR BLD AUTO: 0.3 % (ref 0–0.5)
LYMPHOCYTES # BLD AUTO: 2.6 K/UL (ref 1–4.8)
LYMPHOCYTES NFR BLD: 38.1 % (ref 18–48)
MAGNESIUM SERPL-MCNC: 1.6 MG/DL (ref 1.6–2.6)
MCH RBC QN AUTO: 27.2 PG (ref 27–31)
MCHC RBC AUTO-ENTMCNC: 31.2 G/DL (ref 32–36)
MCV RBC AUTO: 87 FL (ref 82–98)
MONOCYTES # BLD AUTO: 0.6 K/UL (ref 0.3–1)
MONOCYTES NFR BLD: 8.6 % (ref 4–15)
NEUTROPHILS # BLD AUTO: 3 K/UL (ref 1.8–7.7)
NEUTROPHILS NFR BLD: 43.2 % (ref 38–73)
NRBC BLD-RTO: 0 /100 WBC
PHOSPHATE SERPL-MCNC: 4.7 MG/DL (ref 2.7–4.5)
PLATELET # BLD AUTO: 355 K/UL (ref 150–450)
PMV BLD AUTO: 10.2 FL (ref 9.2–12.9)
POTASSIUM SERPL-SCNC: 3.8 MMOL/L (ref 3.5–5.1)
PROT SERPL-MCNC: 5.9 G/DL (ref 6–8.4)
RBC # BLD AUTO: 3.68 M/UL (ref 4.6–6.2)
SODIUM SERPL-SCNC: 139 MMOL/L (ref 136–145)
WBC # BLD AUTO: 6.85 K/UL (ref 3.9–12.7)

## 2023-11-03 PROCEDURE — 63600175 PHARM REV CODE 636 W HCPCS

## 2023-11-03 PROCEDURE — 25000242 PHARM REV CODE 250 ALT 637 W/ HCPCS: Performed by: STUDENT IN AN ORGANIZED HEALTH CARE EDUCATION/TRAINING PROGRAM

## 2023-11-03 PROCEDURE — 99233 SBSQ HOSP IP/OBS HIGH 50: CPT | Mod: ,,, | Performed by: HOSPITALIST

## 2023-11-03 PROCEDURE — 63600175 PHARM REV CODE 636 W HCPCS: Performed by: STUDENT IN AN ORGANIZED HEALTH CARE EDUCATION/TRAINING PROGRAM

## 2023-11-03 PROCEDURE — 84100 ASSAY OF PHOSPHORUS: CPT

## 2023-11-03 PROCEDURE — 83735 ASSAY OF MAGNESIUM: CPT

## 2023-11-03 PROCEDURE — 27000221 HC OXYGEN, UP TO 24 HOURS

## 2023-11-03 PROCEDURE — 94640 AIRWAY INHALATION TREATMENT: CPT

## 2023-11-03 PROCEDURE — 99233 PR SUBSEQUENT HOSPITAL CARE,LEVL III: ICD-10-PCS | Mod: ,,, | Performed by: HOSPITALIST

## 2023-11-03 PROCEDURE — 88112 CYTOPATH CELL ENHANCE TECH: CPT | Mod: 26,,, | Performed by: STUDENT IN AN ORGANIZED HEALTH CARE EDUCATION/TRAINING PROGRAM

## 2023-11-03 PROCEDURE — 88112 CYTOPATH CELL ENHANCE TECH: CPT | Performed by: STUDENT IN AN ORGANIZED HEALTH CARE EDUCATION/TRAINING PROGRAM

## 2023-11-03 PROCEDURE — 80053 COMPREHEN METABOLIC PANEL: CPT

## 2023-11-03 PROCEDURE — 20600001 HC STEP DOWN PRIVATE ROOM

## 2023-11-03 PROCEDURE — 25000003 PHARM REV CODE 250: Performed by: STUDENT IN AN ORGANIZED HEALTH CARE EDUCATION/TRAINING PROGRAM

## 2023-11-03 PROCEDURE — 25000003 PHARM REV CODE 250

## 2023-11-03 PROCEDURE — 93010 ELECTROCARDIOGRAM REPORT: CPT | Mod: ,,, | Performed by: INTERNAL MEDICINE

## 2023-11-03 PROCEDURE — 93005 ELECTROCARDIOGRAM TRACING: CPT

## 2023-11-03 PROCEDURE — 99900035 HC TECH TIME PER 15 MIN (STAT)

## 2023-11-03 PROCEDURE — 88112 PR  CYTOPATH, CELL ENHANCE TECH: ICD-10-PCS | Mod: 26,,, | Performed by: STUDENT IN AN ORGANIZED HEALTH CARE EDUCATION/TRAINING PROGRAM

## 2023-11-03 PROCEDURE — 93010 EKG 12-LEAD: ICD-10-PCS | Mod: ,,, | Performed by: INTERNAL MEDICINE

## 2023-11-03 PROCEDURE — 85025 COMPLETE CBC W/AUTO DIFF WBC: CPT

## 2023-11-03 PROCEDURE — 25000003 PHARM REV CODE 250: Performed by: INTERNAL MEDICINE

## 2023-11-03 PROCEDURE — 94761 N-INVAS EAR/PLS OXIMETRY MLT: CPT

## 2023-11-03 PROCEDURE — 25500020 PHARM REV CODE 255: Performed by: HOSPITALIST

## 2023-11-03 PROCEDURE — 36415 COLL VENOUS BLD VENIPUNCTURE: CPT

## 2023-11-03 RX ORDER — FUROSEMIDE 10 MG/ML
40 INJECTION INTRAMUSCULAR; INTRAVENOUS ONCE
Status: COMPLETED | OUTPATIENT
Start: 2023-11-03 | End: 2023-11-03

## 2023-11-03 RX ORDER — KETOROLAC TROMETHAMINE 15 MG/ML
15 INJECTION, SOLUTION INTRAMUSCULAR; INTRAVENOUS ONCE
Status: COMPLETED | OUTPATIENT
Start: 2023-11-03 | End: 2023-11-03

## 2023-11-03 RX ORDER — IPRATROPIUM BROMIDE AND ALBUTEROL SULFATE 2.5; .5 MG/3ML; MG/3ML
3 SOLUTION RESPIRATORY (INHALATION) EVERY 6 HOURS PRN
Status: DISCONTINUED | OUTPATIENT
Start: 2023-11-03 | End: 2023-11-03

## 2023-11-03 RX ORDER — HYDROMORPHONE HYDROCHLORIDE 2 MG/ML
2 INJECTION, SOLUTION INTRAMUSCULAR; INTRAVENOUS; SUBCUTANEOUS
Status: DISCONTINUED | OUTPATIENT
Start: 2023-11-03 | End: 2023-11-04

## 2023-11-03 RX ORDER — IPRATROPIUM BROMIDE AND ALBUTEROL SULFATE 2.5; .5 MG/3ML; MG/3ML
3 SOLUTION RESPIRATORY (INHALATION)
Status: DISCONTINUED | OUTPATIENT
Start: 2023-11-03 | End: 2023-11-10 | Stop reason: HOSPADM

## 2023-11-03 RX ORDER — SODIUM CHLORIDE, SODIUM LACTATE, POTASSIUM CHLORIDE, CALCIUM CHLORIDE 600; 310; 30; 20 MG/100ML; MG/100ML; MG/100ML; MG/100ML
INJECTION, SOLUTION INTRAVENOUS CONTINUOUS
Status: DISCONTINUED | OUTPATIENT
Start: 2023-11-03 | End: 2023-11-03

## 2023-11-03 RX ORDER — MAGNESIUM SULFATE 1 G/100ML
1 INJECTION INTRAVENOUS ONCE
Status: COMPLETED | OUTPATIENT
Start: 2023-11-03 | End: 2023-11-03

## 2023-11-03 RX ADMIN — FUROSEMIDE 40 MG: 10 INJECTION, SOLUTION INTRAVENOUS at 03:11

## 2023-11-03 RX ADMIN — MORPHINE SULFATE 30 MG: 30 TABLET, FILM COATED, EXTENDED RELEASE ORAL at 08:11

## 2023-11-03 RX ADMIN — GABAPENTIN 600 MG: 300 CAPSULE ORAL at 08:11

## 2023-11-03 RX ADMIN — SENNOSIDES AND DOCUSATE SODIUM 1 TABLET: 8.6; 5 TABLET ORAL at 08:11

## 2023-11-03 RX ADMIN — HYDROXYZINE PAMOATE 25 MG: 25 CAPSULE ORAL at 04:11

## 2023-11-03 RX ADMIN — HYDROMORPHONE HYDROCHLORIDE 2 MG: 2 INJECTION, SOLUTION INTRAMUSCULAR; INTRAVENOUS; SUBCUTANEOUS at 06:11

## 2023-11-03 RX ADMIN — APIXABAN 5 MG: 5 TABLET, FILM COATED ORAL at 08:11

## 2023-11-03 RX ADMIN — ACETAMINOPHEN 1000 MG: 500 TABLET ORAL at 05:11

## 2023-11-03 RX ADMIN — ONDANSETRON 4 MG: 2 INJECTION INTRAMUSCULAR; INTRAVENOUS at 06:11

## 2023-11-03 RX ADMIN — PROMETHAZINE HYDROCHLORIDE 6.25 MG: 25 INJECTION INTRAMUSCULAR; INTRAVENOUS at 06:11

## 2023-11-03 RX ADMIN — HYDROXYZINE PAMOATE 25 MG: 25 CAPSULE ORAL at 08:11

## 2023-11-03 RX ADMIN — ONDANSETRON 4 MG: 2 INJECTION INTRAMUSCULAR; INTRAVENOUS at 02:11

## 2023-11-03 RX ADMIN — HYDROMORPHONE HYDROCHLORIDE 2 MG: 2 INJECTION, SOLUTION INTRAMUSCULAR; INTRAVENOUS; SUBCUTANEOUS at 02:11

## 2023-11-03 RX ADMIN — FOLIC ACID 1 MG: 1 TABLET ORAL at 08:11

## 2023-11-03 RX ADMIN — HYDROMORPHONE HYDROCHLORIDE 2 MG: 2 INJECTION, SOLUTION INTRAMUSCULAR; INTRAVENOUS; SUBCUTANEOUS at 12:11

## 2023-11-03 RX ADMIN — FLUCONAZOLE 400 MG: 200 TABLET ORAL at 08:11

## 2023-11-03 RX ADMIN — HYDROMORPHONE HYDROCHLORIDE 2 MG: 2 INJECTION, SOLUTION INTRAMUSCULAR; INTRAVENOUS; SUBCUTANEOUS at 03:11

## 2023-11-03 RX ADMIN — IPRATROPIUM BROMIDE AND ALBUTEROL SULFATE 3 ML: .5; 3 SOLUTION RESPIRATORY (INHALATION) at 03:11

## 2023-11-03 RX ADMIN — HYDROMORPHONE HYDROCHLORIDE 2 MG: 2 INJECTION, SOLUTION INTRAMUSCULAR; INTRAVENOUS; SUBCUTANEOUS at 08:11

## 2023-11-03 RX ADMIN — Medication 6 MG: at 08:11

## 2023-11-03 RX ADMIN — GABAPENTIN 600 MG: 300 CAPSULE ORAL at 02:11

## 2023-11-03 RX ADMIN — BICTEGRAVIR SODIUM, EMTRICITABINE, AND TENOFOVIR ALAFENAMIDE FUMARATE 1 TABLET: 50; 200; 25 TABLET ORAL at 08:11

## 2023-11-03 RX ADMIN — IPRATROPIUM BROMIDE AND ALBUTEROL SULFATE 3 ML: .5; 3 SOLUTION RESPIRATORY (INHALATION) at 09:11

## 2023-11-03 RX ADMIN — PANTOPRAZOLE SODIUM 40 MG: 40 TABLET, DELAYED RELEASE ORAL at 08:11

## 2023-11-03 RX ADMIN — MEROPENEM 1 G: 1 INJECTION INTRAVENOUS at 03:11

## 2023-11-03 RX ADMIN — IOHEXOL 75 ML: 350 INJECTION, SOLUTION INTRAVENOUS at 12:11

## 2023-11-03 RX ADMIN — ACETAMINOPHEN 1000 MG: 500 TABLET ORAL at 02:11

## 2023-11-03 RX ADMIN — MEROPENEM 1 G: 1 INJECTION INTRAVENOUS at 11:11

## 2023-11-03 RX ADMIN — PROMETHAZINE HYDROCHLORIDE 6.25 MG: 25 INJECTION INTRAMUSCULAR; INTRAVENOUS at 10:11

## 2023-11-03 RX ADMIN — SODIUM CHLORIDE, POTASSIUM CHLORIDE, SODIUM LACTATE AND CALCIUM CHLORIDE: 600; 310; 30; 20 INJECTION, SOLUTION INTRAVENOUS at 10:11

## 2023-11-03 RX ADMIN — HYDROMORPHONE HYDROCHLORIDE 2 MG: 2 INJECTION, SOLUTION INTRAMUSCULAR; INTRAVENOUS; SUBCUTANEOUS at 04:11

## 2023-11-03 RX ADMIN — HYDROMORPHONE HYDROCHLORIDE 2 MG: 2 INJECTION, SOLUTION INTRAMUSCULAR; INTRAVENOUS; SUBCUTANEOUS at 10:11

## 2023-11-03 RX ADMIN — KETOROLAC TROMETHAMINE 15 MG: 15 INJECTION INTRAMUSCULAR; INTRAVENOUS at 05:11

## 2023-11-03 RX ADMIN — ACETAMINOPHEN 1000 MG: 500 TABLET ORAL at 08:11

## 2023-11-03 RX ADMIN — MAGNESIUM SULFATE HEPTAHYDRATE 1 G: 500 INJECTION, SOLUTION INTRAMUSCULAR; INTRAVENOUS at 08:11

## 2023-11-03 NOTE — ASSESSMENT & PLAN NOTE
Admit with hemoglobin 10.2; Baseline ~ 10    Lab Results   Component Value Date    HGB 10.0 (L) 11/03/2023     - Daily CBC   - Transfuse hgb <7

## 2023-11-03 NOTE — PLAN OF CARE
Problem: Adult Inpatient Plan of Care  Goal: Patient-Specific Goal (Individualized)  Outcome: Ongoing, Progressing     Problem: Adult Inpatient Plan of Care  Goal: Absence of Hospital-Acquired Illness or Injury  Outcome: Ongoing, Progressing     Problem: Nausea and Vomiting  Goal: Fluid and Electrolyte Balance  Outcome: Ongoing, Progressing     Problem: Fluid Imbalance (Pancreatitis)  Goal: Fluid Balance  Outcome: Ongoing, Progressing

## 2023-11-03 NOTE — PROGRESS NOTES
Called CT to get an estimated time scan will be done.  CT department requesting to hold lunch tray and keep NPO until they get in touch with resident to decide a time.  CT will call nurse back with a time.  CT department made aware that patient agreeable to IV contrast, but is refusing the po contrast.  Will update patient.

## 2023-11-03 NOTE — PROGRESS NOTES
Patient returned to room from abd/pelvic CT scan.  Paging MD to request diet.  Reconnecting PIV to LR@125cc/hr.  Next IV pain med available at 2:20pm.

## 2023-11-03 NOTE — NURSING
Transferred to room 37932. Alert and oriented x 4. Ambulatory on room air. Speech is clear. Tele monitor intact-telemetry notified of box number. Safety measures in place. Bed in low position. Call light in reach.

## 2023-11-03 NOTE — PROGRESS NOTES
Report given to nurse, Anne, on 14W.  Patient will receive 2 more doses of IV dilaudid (at 4pm and 6pm) before transferring to [facility].  Patient and receiving nurse are both aware.

## 2023-11-03 NOTE — PROGRESS NOTES
CXR done.  MD d/c continuous IVFs and ordered a one time dose of IV lasix.  Urinal provided and patient aware of need to measure and document urine output.  RT notified patient needs a prn breathing treatment for wheezing.

## 2023-11-03 NOTE — ASSESSMENT & PLAN NOTE
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 Lipase mildly elevated. WBC and lactate WNL. On IV Cefepime for 4wk course. Discharged on MSIR and Morphine for pain control. EUS (10/23/23) "complex collections along the neck, body, and tail of pancreas. Not suitable for endoscopic transmural stenting/drainage." Scheduled for follow up CT in 4-6 weeks. A repeat CT was obtained on 10/29 due to worsening abdominal pain despite high opioid doses, showing new fluid collection pressing on IVC. Discussed with AES, who will not be able to drain new collection endoscopically, and IR, who does not have a good sampling window and is reluctant to use transhepatic/transpleural methods 2/2 creating infectious tract. General surgery consulted though no plans for acute intervention. Abx broadened from cefepime to meropenem + diflucan after fevering on 10/29; ID consulted. Bcx's (10/30) remain NGTD. IR procedure delayed 2/2 CT scanner malfunction.     - s/p IR drain w/ 7 cc output on 11/2, will re-image due to increasing pain s/p procedure  - f/u gram stain, cultures, amylase, other labs  - continue creon  - Meropenem and Diflucan started (10/30 - 11/13) per ID recs  - pain control - Dilaudid 2 mg Q2H PRN; scheduled Tylenol, gabapentin 600 mg TID; MS contin 30 mg BID         "

## 2023-11-03 NOTE — SUBJECTIVE & OBJECTIVE
Interval History: Had IR drain, still having a lot of abdominal pain, worsening after surgery. He is hemodynamically stable, requesting increase in his pain regimen.       Objective:     Vital Signs (Most Recent):  Temp: 98 °F (36.7 °C) (11/03/23 0829)  Pulse: 85 (11/03/23 0829)  Resp: 18 (11/03/23 0829)  BP: 113/81 (11/03/23 0829)  SpO2: (!) 91 % (11/03/23 0829) Vital Signs (24h Range):  Temp:  [97.4 °F (36.3 °C)-98.7 °F (37.1 °C)] 98 °F (36.7 °C)  Pulse:  [] 85  Resp:  [17-20] 18  SpO2:  [89 %-96 %] 91 %  BP: (111-134)/(66-81) 113/81     Weight: 68 kg (150 lb)  Body mass index is 21.52 kg/m².  No intake or output data in the 24 hours ending 11/03/23 1036      Physical Exam  HENT:      Head: Normocephalic.      Nose: Nose normal.      Mouth/Throat:      Mouth: Mucous membranes are moist.   Eyes:      Pupils: Pupils are equal, round, and reactive to light.   Cardiovascular:      Rate and Rhythm: Normal rate.   Pulmonary:      Effort: Pulmonary effort is normal.      Breath sounds: No rales.   Abdominal:      General: Abdomen is flat.      Tenderness: There is abdominal tenderness. There is guarding.   Musculoskeletal:         General: Normal range of motion.      Cervical back: Normal range of motion.      Right lower leg: No edema.      Left lower leg: No edema.   Skin:     General: Skin is warm.      Capillary Refill: Capillary refill takes less than 2 seconds.      Findings: No lesion.   Neurological:      General: No focal deficit present.      Mental Status: He is alert and oriented to person, place, and time.   Psychiatric:         Mood and Affect: Mood normal.             Significant Labs: All pertinent labs within the past 24 hours have been reviewed.    Significant Imaging: I have reviewed all pertinent imaging results/findings within the past 24 hours.

## 2023-11-03 NOTE — PROGRESS NOTES
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 He has been in and out of the hospital for the last two weeks. His most recent discharge was on10/19 after admission for recurrent symptoms of his acute necrotizing pancreatitis. He was discharged home on MSIR and Morphine which was working well until yesterday morning (10/21) when he started having increased abdominal pain w/nausea. He attempted to eat something but vomited. He denies any recent alcohol or tobacco use. He reports eating normally since discharge. Last meal was a grilled cheese yesterday for dinner. Last BM was loose and "oily" but normal color w/out blood present. He was evaluated by AES on his 10/5 admission who recommended against any intervention on the pancreatic collection due to immaturity. He has been on IV Cefepime since 10/5 with possible end date 11/2.     In the ED, he was afebrile and tachycardic to the 130s. Labs were notable for K 3.3, Bicarb 21, BUN 5, Alb 3.0, Lipase 96. Lactate WNL. Repeat CT A/P showed small R pleural effusion but no acute intra-abdominopelvic abnormality. Peripancreatic fluid collection at the pancreatic tail stable from previous imaging. He was given Dilaudid 2mg x1, Dilaudid 1mg x2 for pain and his evening dose of Cefepime. Hospital Medicine was called for admission of     Overview/Hospital Course:  Admitted to hospital medicine. Cefepime and MS contin continued, with " "addition of scheduled tylenol + toradol and then breakthrough dilaudid. AES consulted again now that imaging findings were stable >4w since initial diagnosis and persistent symptoms. Underwent EUS with findings of stable fluid collection which was not drained ("Complex collections along the neck, body, and tail of pancreas (body/tail region likely connected). Not suitable for endoscopic transmural stenting/drainage.") He will need follow-up CT scan in 4-6 weeks. Cefepime to complete 11/2/23. Patient with increasing pain and inadequate pain control despite dilaudid 2mg q4h w/ MS Contin 15mg bid and multimodal pain medication. A repeat CT Abd/Pelvis on 10/29 showed similar prior fluid collection with new fluid along the posterior aspect of the liver causing IVC compression. Patients case discussed with both IR and AES, both of whom do not feel he is a good candidate for drainage, either percutaneously/transhepatic/transpleural or endoscopically. ID consulted and antibiotics broadened to Meropenem + Diflucan on 10/30 after fevers on evening of 10/29. MS Contin increased to 30 mg BID. He underwent IR aspiration of his older collection on 11/2 w/ 7cc fluid aspirated. He developed worsening abdominal pain after his procedure, labs pending, will increase pain medication and repeat CT-scan abd.    Interval History: Had IR drain, still having a lot of abdominal pain, worsening after surgery. He is hemodynamically stable, requesting increase in his pain regimen.       Objective:     Vital Signs (Most Recent):  Temp: 98 °F (36.7 °C) (11/03/23 0829)  Pulse: 85 (11/03/23 0829)  Resp: 18 (11/03/23 0829)  BP: 113/81 (11/03/23 0829)  SpO2: (!) 91 % (11/03/23 0829) Vital Signs (24h Range):  Temp:  [97.4 °F (36.3 °C)-98.7 °F (37.1 °C)] 98 °F (36.7 °C)  Pulse:  [] 85  Resp:  [17-20] 18  SpO2:  [89 %-96 %] 91 %  BP: (111-134)/(66-81) 113/81     Weight: 68 kg (150 lb)  Body mass index is 21.52 kg/m².  No intake or output data in " "the 24 hours ending 11/03/23 1036      Physical Exam  HENT:      Head: Normocephalic.      Nose: Nose normal.      Mouth/Throat:      Mouth: Mucous membranes are moist.   Eyes:      Pupils: Pupils are equal, round, and reactive to light.   Cardiovascular:      Rate and Rhythm: Normal rate.   Pulmonary:      Effort: Pulmonary effort is normal.      Breath sounds: No rales.   Abdominal:      General: Abdomen is flat.      Tenderness: There is abdominal tenderness. There is guarding.   Musculoskeletal:         General: Normal range of motion.      Cervical back: Normal range of motion.      Right lower leg: No edema.      Left lower leg: No edema.   Skin:     General: Skin is warm.      Capillary Refill: Capillary refill takes less than 2 seconds.      Findings: No lesion.   Neurological:      General: No focal deficit present.      Mental Status: He is alert and oriented to person, place, and time.   Psychiatric:         Mood and Affect: Mood normal.             Significant Labs: All pertinent labs within the past 24 hours have been reviewed.    Significant Imaging: I have reviewed all pertinent imaging results/findings within the past 24 hours.    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 Lipase mildly elevated. WBC and lactate WNL. On IV Cefepime for 4wk course. Discharged on MSIR and Morphine for pain control. EUS (10/23/23) "complex collections along the neck, body, and tail of pancreas. Not suitable for endoscopic transmural stenting/drainage." Scheduled for follow up CT in 4-6 weeks. A repeat CT was obtained on 10/29 due to worsening abdominal pain despite high opioid doses, showing new fluid collection pressing on IVC. Discussed with AES, who will not be able to drain " "new collection endoscopically, and IR, who does not have a good sampling window and is reluctant to use transhepatic/transpleural methods 2/2 creating infectious tract. General surgery consulted though no plans for acute intervention. Abx broadened from cefepime to meropenem + diflucan after fevering on 10/29; ID consulted. Bcx's (10/30) remain NGTD. IR procedure delayed 2/2 CT scanner malfunction.     - s/p IR drain w/ 7 cc output on 11/2, will re-image due to increasing pain s/p procedure  - f/u gram stain, cultures, amylase, other labs  - continue creon  - Meropenem and Diflucan started (10/30 - 11/13) per ID recs  - pain control - Dilaudid 2 mg Q2H PRN; scheduled Tylenol, gabapentin 600 mg TID; MS contin 30 mg BID           Moderate malnutrition  Nutrition consulted. Most recent weight and BMI monitored-     Measurements:  Wt Readings from Last 1 Encounters:   10/26/23 68 kg (150 lb)   Body mass index is 21.52 kg/m².    Patient has been screened and assessed by RD.    Malnutrition Type:  Context: chronic illness  Level:      Malnutrition Characteristic Summary:  Weight Loss (Malnutrition): 5% in 1 month  Energy Intake (Malnutrition): less than 75% for greater than or equal to 1 month    Interventions/Recommendations (treatment strategy):  1. When medically able, ADAT Regular diet  2. Add Boost Breeze TID. Modify to Boost Plus (vanilla) when diet advanced.  3. RD to monitor and follow      Necrotizing pancreatitis  Plan as above      Sepsis  This patient does have evidence of infective focus  My overall impression is sepsis.  Source: Abdominal  Antibiotics given-   Antibiotics (72h ago, onward)      Start     Stop Route Frequency Ordered    11/01/23 1000  meropenem (MERREM) 1 g in sodium chloride 0.9 % 100 mL IVPB (MB+)         11/13/23 0159 IV Every 8 hours (non-standard times) 11/01/23 0855          Latest lactate reviewed-  No results for input(s): "LACTATE" in the last 72 hours.    Fluid challenge Not needed " - patient is not hypotensive      Post- resuscitation assessment No - Post resuscitation assessment not needed       Will Not start Pressors- Levophed for MAP of 65  Source control achieved by: Meropenem, Diflucan    Therapeutic opioid-induced constipation (OIC)  Patient with extended course of high-potency opioids since being hospitalized multiple times for pancreatitis, starting on 9/17/23. KUB (10/27/23) notable for high-stool burden, though without focal dilatation, transition points, or free air. Patient with increased abdominal pain that is different in character and location from description of pain on admission, now including dull, achey pain in belt like distribution with intermittent nausea. Interval CT abdomen (10/30) with decreased stool burden following lactulose multiple times daily.     - Will resume bowel regimen of pericolace BID and miralax BID   - can add lactulose/enemas if needed    Pancreatic pseudocyst/cyst  Plan as above    Hypokalemia  K 3.3 on admission; likely 2/2 to decreased PO intake    - Gave IV K 10meq Q1H for 6 doses   - Replete K PRN       Continuous severe abdominal pain  Likely 2/2 to pancreatitis; Discharged home on Morphine taper which was working well until yesterday morning    - See Chronic Pancreatitis       Alcohol use disorder  Hx of EtOH use disorder. On most recent admission, patient's ethanol negative in setting of pancreatitis flare.   - monitor for signs of withdrawal, though patient denies EtOH use in between recent admissions      Fluid collection of pancreas  Plan as above        Splenic vein thrombosis  Splenic vein thrombus originally seen on 9/20 imaging, and persistent on most recent CT abdominal imaging. Started on DOAC at last admission, loading dose completed.     - Continue eliquis 5 mg BID  - per IR, no need to hold today       Mild intermittent asthma without complication  History of asthma requiring PRN albuterol. Has not recently been using any inhaler  "treatment.     Normocytic anemia  Admit with hemoglobin 10.2; Baseline ~ 10    Lab Results   Component Value Date    HGB 10.0 (L) 11/03/2023     - Daily CBC   - Transfuse hgb <7       Polycythemia vera  Hgb 10.5 on admission. Follows up with hematology as outpatient.   - continue to monitor daily CBC      HIV infection  This patient in known to have HIV+ status (Have detected HIV PCR but never CD4 <200 or AIDS defining illness). Labs reviewed- No results found for: "CD4", No results found for: "HIVDNAPCR". Patient is on HAART. Will continue HAART. Continue to monitor routine labs. Last CD4 count was   Lab Results   Component Value Date    ABSOLUTECD4 1080 10/12/2023     - Continue home Biktarvy         VTE Risk Mitigation (From admission, onward)           Ordered     apixaban tablet 5 mg  2 times daily         10/24/23 0744                    Discharge Planning   CASTILLO: 11/3/2023     Code Status: Full Code   Is the patient medically ready for discharge?:     Reason for patient still in hospital (select all that apply): Treatment and Imaging  Discharge Plan A: Home with family                  Anabelle Rosas DO  Department of Hospital Medicine   [facility] ([place] [address])    "

## 2023-11-03 NOTE — CARE UPDATE
"RAPID RESPONSE NURSE CHART REVIEW        Chart Reviewed: 11/03/2023, 6:10 PM    MRN: [id number]  Bed: 75953/88932 A    Dx: Chronic pancreatitis    Tasia Cardona has a past medical history of Acute necrotizing pancreatitis, Alcohol use disorder, Asthma, Hepatitis C antibody positive in blood, HIV infection, Corinne-Chauhan tear, Normocytic anemia, Pancreatic pseudocyst/cyst, Polycythemia vera, Positive CMV IgG serology, and Splenic vein thrombosis.    Last VS: /80   Pulse (!) 115   Temp 97.6 °F (36.4 °C)   Resp 20   Ht 5' 10" (1.778 m)   Wt 68 kg (150 lb)   SpO2 (!) 93%   BMI 21.52 kg/m²     24H Vital Sign Range:  Temp:  [97.6 °F (36.4 °C)-98.7 °F (37.1 °C)]   Pulse:  []   Resp:  [17-20]   BP: (113-136)/(79-82)   SpO2:  [87 %-98 %]     Level of Consciousness (AVPU): alert    Recent Labs     11/01/23  0307 11/02/23  0403 11/03/23  0527   WBC 4.23 4.86 6.85   HGB 8.6* 8.5* 10.0*   HCT 28.1* 27.7* 32.1*    283 355       Recent Labs     11/01/23 0307 11/02/23  0403 11/03/23  0527    140 139   K 3.9 4.0 3.8    103 103   CO2 21* 24 21*   BUN 5* 5* 5*   CREATININE 0.6 0.6 0.6   GLU 69* 100 86   PHOS 4.5 4.5 4.7*   MG 1.8 1.8 1.6        No results for input(s): "PH", "PCO2", "PO2", "HCO3", "POCSATURATED", "BE" in the last 72 hours.     OXYGEN:  Flow (L/min): 3          MEWS score: 1    Bedside RNAlyssa  contacted for Tachycardia. Reports -120s, pt being transferred to  14WT. Continuous Tele and PulseOX, EKG ordered. Pt with no symptoms. No additional concerns verbalized at this time. Instructed to call [phone] for further concerns or assistance.    Yony Calderon, RN        "

## 2023-11-04 PROBLEM — J90 PLEURAL EFFUSION: Status: ACTIVE | Noted: 2023-11-04

## 2023-11-04 LAB
ALBUMIN SERPL BCP-MCNC: 2.6 G/DL (ref 3.5–5.2)
ALP SERPL-CCNC: 67 U/L (ref 55–135)
ALT SERPL W/O P-5'-P-CCNC: 5 U/L (ref 10–44)
ANION GAP SERPL CALC-SCNC: 14 MMOL/L (ref 8–16)
AST SERPL-CCNC: 12 U/L (ref 10–40)
BACTERIA BLD CULT: NORMAL
BACTERIA BLD CULT: NORMAL
BASOPHILS # BLD AUTO: 0.03 K/UL (ref 0–0.2)
BASOPHILS NFR BLD: 0.4 % (ref 0–1.9)
BILIRUB SERPL-MCNC: 0.2 MG/DL (ref 0.1–1)
BNP SERPL-MCNC: <10 PG/ML (ref 0–99)
BUN SERPL-MCNC: 7 MG/DL (ref 6–20)
CALCIUM SERPL-MCNC: 8.3 MG/DL (ref 8.7–10.5)
CHLORIDE SERPL-SCNC: 100 MMOL/L (ref 95–110)
CO2 SERPL-SCNC: 23 MMOL/L (ref 23–29)
CREAT SERPL-MCNC: 0.6 MG/DL (ref 0.5–1.4)
DIFFERENTIAL METHOD: ABNORMAL
EOSINOPHIL # BLD AUTO: 0.5 K/UL (ref 0–0.5)
EOSINOPHIL NFR BLD: 5.9 % (ref 0–8)
ERYTHROCYTE [DISTWIDTH] IN BLOOD BY AUTOMATED COUNT: 18.6 % (ref 11.5–14.5)
EST. GFR  (NO RACE VARIABLE): >60 ML/MIN/1.73 M^2
GLUCOSE SERPL-MCNC: 104 MG/DL (ref 70–110)
HCT VFR BLD AUTO: 29.4 % (ref 40–54)
HGB BLD-MCNC: 8.8 G/DL (ref 14–18)
IMM GRANULOCYTES # BLD AUTO: 0.03 K/UL (ref 0–0.04)
IMM GRANULOCYTES NFR BLD AUTO: 0.4 % (ref 0–0.5)
LACTATE SERPL-SCNC: 0.6 MMOL/L (ref 0.5–2.2)
LIPASE SERPL-CCNC: 145 U/L (ref 4–60)
LYMPHOCYTES # BLD AUTO: 2.1 K/UL (ref 1–4.8)
LYMPHOCYTES NFR BLD: 24.4 % (ref 18–48)
MAGNESIUM SERPL-MCNC: 1.9 MG/DL (ref 1.6–2.6)
MCH RBC QN AUTO: 26.7 PG (ref 27–31)
MCHC RBC AUTO-ENTMCNC: 29.9 G/DL (ref 32–36)
MCV RBC AUTO: 89 FL (ref 82–98)
MONOCYTES # BLD AUTO: 0.6 K/UL (ref 0.3–1)
MONOCYTES NFR BLD: 7.3 % (ref 4–15)
NEUTROPHILS # BLD AUTO: 5.3 K/UL (ref 1.8–7.7)
NEUTROPHILS NFR BLD: 61.6 % (ref 38–73)
NRBC BLD-RTO: 0 /100 WBC
PHOSPHATE SERPL-MCNC: 4.2 MG/DL (ref 2.7–4.5)
PLATELET # BLD AUTO: 327 K/UL (ref 150–450)
PMV BLD AUTO: 10.4 FL (ref 9.2–12.9)
POTASSIUM SERPL-SCNC: 3.4 MMOL/L (ref 3.5–5.1)
PROT SERPL-MCNC: 6.1 G/DL (ref 6–8.4)
RBC # BLD AUTO: 3.3 M/UL (ref 4.6–6.2)
SODIUM SERPL-SCNC: 137 MMOL/L (ref 136–145)
WBC # BLD AUTO: 8.51 K/UL (ref 3.9–12.7)

## 2023-11-04 PROCEDURE — 83880 ASSAY OF NATRIURETIC PEPTIDE: CPT | Performed by: STUDENT IN AN ORGANIZED HEALTH CARE EDUCATION/TRAINING PROGRAM

## 2023-11-04 PROCEDURE — 84100 ASSAY OF PHOSPHORUS: CPT

## 2023-11-04 PROCEDURE — 63600175 PHARM REV CODE 636 W HCPCS: Performed by: STUDENT IN AN ORGANIZED HEALTH CARE EDUCATION/TRAINING PROGRAM

## 2023-11-04 PROCEDURE — 25000003 PHARM REV CODE 250

## 2023-11-04 PROCEDURE — 25000003 PHARM REV CODE 250: Performed by: INTERNAL MEDICINE

## 2023-11-04 PROCEDURE — 63600175 PHARM REV CODE 636 W HCPCS

## 2023-11-04 PROCEDURE — 27000221 HC OXYGEN, UP TO 24 HOURS

## 2023-11-04 PROCEDURE — 94761 N-INVAS EAR/PLS OXIMETRY MLT: CPT

## 2023-11-04 PROCEDURE — 99233 SBSQ HOSP IP/OBS HIGH 50: CPT | Mod: ,,, | Performed by: HOSPITALIST

## 2023-11-04 PROCEDURE — 94640 AIRWAY INHALATION TREATMENT: CPT

## 2023-11-04 PROCEDURE — 85025 COMPLETE CBC W/AUTO DIFF WBC: CPT

## 2023-11-04 PROCEDURE — 83605 ASSAY OF LACTIC ACID: CPT | Performed by: STUDENT IN AN ORGANIZED HEALTH CARE EDUCATION/TRAINING PROGRAM

## 2023-11-04 PROCEDURE — 25000242 PHARM REV CODE 250 ALT 637 W/ HCPCS: Performed by: STUDENT IN AN ORGANIZED HEALTH CARE EDUCATION/TRAINING PROGRAM

## 2023-11-04 PROCEDURE — 99900035 HC TECH TIME PER 15 MIN (STAT)

## 2023-11-04 PROCEDURE — 25000003 PHARM REV CODE 250: Performed by: STUDENT IN AN ORGANIZED HEALTH CARE EDUCATION/TRAINING PROGRAM

## 2023-11-04 PROCEDURE — 83690 ASSAY OF LIPASE: CPT | Performed by: STUDENT IN AN ORGANIZED HEALTH CARE EDUCATION/TRAINING PROGRAM

## 2023-11-04 PROCEDURE — 20600001 HC STEP DOWN PRIVATE ROOM

## 2023-11-04 PROCEDURE — 99233 PR SUBSEQUENT HOSPITAL CARE,LEVL III: ICD-10-PCS | Mod: ,,, | Performed by: HOSPITALIST

## 2023-11-04 PROCEDURE — 83735 ASSAY OF MAGNESIUM: CPT

## 2023-11-04 PROCEDURE — 80053 COMPREHEN METABOLIC PANEL: CPT

## 2023-11-04 RX ORDER — FUROSEMIDE 10 MG/ML
40 INJECTION INTRAMUSCULAR; INTRAVENOUS ONCE
Status: COMPLETED | OUTPATIENT
Start: 2023-11-04 | End: 2023-11-04

## 2023-11-04 RX ORDER — NALOXONE HCL 0.4 MG/ML
0.02 VIAL (ML) INJECTION
Status: DISCONTINUED | OUTPATIENT
Start: 2023-11-04 | End: 2023-11-08

## 2023-11-04 RX ORDER — ACETAMINOPHEN 325 MG/1
650 TABLET ORAL EVERY 6 HOURS PRN
Status: DISCONTINUED | OUTPATIENT
Start: 2023-11-04 | End: 2023-11-07

## 2023-11-04 RX ADMIN — PANTOPRAZOLE SODIUM 40 MG: 40 TABLET, DELAYED RELEASE ORAL at 08:11

## 2023-11-04 RX ADMIN — HYDROMORPHONE HYDROCHLORIDE 2 MG: 2 INJECTION, SOLUTION INTRAMUSCULAR; INTRAVENOUS; SUBCUTANEOUS at 12:11

## 2023-11-04 RX ADMIN — GABAPENTIN 600 MG: 300 CAPSULE ORAL at 08:11

## 2023-11-04 RX ADMIN — MORPHINE SULFATE 30 MG: 30 TABLET, FILM COATED, EXTENDED RELEASE ORAL at 08:11

## 2023-11-04 RX ADMIN — APIXABAN 5 MG: 5 TABLET, FILM COATED ORAL at 08:11

## 2023-11-04 RX ADMIN — SENNOSIDES AND DOCUSATE SODIUM 1 TABLET: 8.6; 5 TABLET ORAL at 08:11

## 2023-11-04 RX ADMIN — PANCRELIPASE 2 CAPSULE: 30000; 6000; 19000 CAPSULE, DELAYED RELEASE PELLETS ORAL at 12:11

## 2023-11-04 RX ADMIN — Medication: at 02:11

## 2023-11-04 RX ADMIN — GABAPENTIN 600 MG: 300 CAPSULE ORAL at 02:11

## 2023-11-04 RX ADMIN — IPRATROPIUM BROMIDE AND ALBUTEROL SULFATE 3 ML: .5; 3 SOLUTION RESPIRATORY (INHALATION) at 09:11

## 2023-11-04 RX ADMIN — BICTEGRAVIR SODIUM, EMTRICITABINE, AND TENOFOVIR ALAFENAMIDE FUMARATE 1 TABLET: 50; 200; 25 TABLET ORAL at 08:11

## 2023-11-04 RX ADMIN — FOLIC ACID 1 MG: 1 TABLET ORAL at 08:11

## 2023-11-04 RX ADMIN — PANCRELIPASE 2 CAPSULE: 30000; 6000; 19000 CAPSULE, DELAYED RELEASE PELLETS ORAL at 08:11

## 2023-11-04 RX ADMIN — HYDROMORPHONE HYDROCHLORIDE 2 MG: 2 INJECTION, SOLUTION INTRAMUSCULAR; INTRAVENOUS; SUBCUTANEOUS at 03:11

## 2023-11-04 RX ADMIN — IPRATROPIUM BROMIDE AND ALBUTEROL SULFATE 3 ML: .5; 3 SOLUTION RESPIRATORY (INHALATION) at 08:11

## 2023-11-04 RX ADMIN — HYDROMORPHONE HYDROCHLORIDE 2 MG: 2 INJECTION, SOLUTION INTRAMUSCULAR; INTRAVENOUS; SUBCUTANEOUS at 06:11

## 2023-11-04 RX ADMIN — ACETAMINOPHEN 650 MG: 325 TABLET ORAL at 06:11

## 2023-11-04 RX ADMIN — HYDROXYZINE PAMOATE 25 MG: 25 CAPSULE ORAL at 08:11

## 2023-11-04 RX ADMIN — MEROPENEM 1 G: 1 INJECTION INTRAVENOUS at 08:11

## 2023-11-04 RX ADMIN — HYDROXYZINE PAMOATE 25 MG: 25 CAPSULE ORAL at 10:11

## 2023-11-04 RX ADMIN — Medication 6 MG: at 08:11

## 2023-11-04 RX ADMIN — ONDANSETRON 4 MG: 2 INJECTION INTRAMUSCULAR; INTRAVENOUS at 03:11

## 2023-11-04 RX ADMIN — FLUCONAZOLE 400 MG: 200 TABLET ORAL at 08:11

## 2023-11-04 RX ADMIN — MEROPENEM 1 G: 1 INJECTION INTRAVENOUS at 04:11

## 2023-11-04 RX ADMIN — PROMETHAZINE HYDROCHLORIDE 6.25 MG: 25 INJECTION INTRAMUSCULAR; INTRAVENOUS at 08:11

## 2023-11-04 RX ADMIN — PANCRELIPASE 2 CAPSULE: 30000; 6000; 19000 CAPSULE, DELAYED RELEASE PELLETS ORAL at 04:11

## 2023-11-04 RX ADMIN — HYDROMORPHONE HYDROCHLORIDE 2 MG: 2 INJECTION, SOLUTION INTRAMUSCULAR; INTRAVENOUS; SUBCUTANEOUS at 08:11

## 2023-11-04 RX ADMIN — HYDROMORPHONE HYDROCHLORIDE 2 MG: 2 INJECTION, SOLUTION INTRAMUSCULAR; INTRAVENOUS; SUBCUTANEOUS at 10:11

## 2023-11-04 RX ADMIN — MEROPENEM 1 G: 1 INJECTION INTRAVENOUS at 12:11

## 2023-11-04 RX ADMIN — FUROSEMIDE 40 MG: 10 INJECTION, SOLUTION INTRAVENOUS at 10:11

## 2023-11-04 RX ADMIN — ACETAMINOPHEN 1000 MG: 500 TABLET ORAL at 06:11

## 2023-11-04 NOTE — ASSESSMENT & PLAN NOTE
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 Lipase mildly elevated. WBC and lactate WNL. On IV Cefepime for 4wk course. Discharged on MSIR and Morphine for pain control. EUS (10/23/23) "complex collections along the neck, body, and tail of pancreas. Not suitable for endoscopic transmural stenting/drainage." Scheduled for follow up CT in 4-6 weeks. A repeat CT was obtained on 10/29 due to worsening abdominal pain despite high opioid doses, showing new fluid collection pressing on IVC. Discussed with AES, who will not be able to drain new collection endoscopically, and IR, who does not have a good sampling window and is reluctant to use transhepatic/transpleural methods 2/2 creating infectious tract. General surgery consulted though no plans for acute intervention. Abx broadened from cefepime to meropenem + diflucan after fevering on 10/29; ID consulted. Bcx's (10/30) remain NGTD. S/p IR aspiration w/ 7 cc output on 11/2, will; negative gram stain, other labs remain pending. Course complicated by R-sided pleural effusion. Repeat abdominal CT with somewhat interval decrease of collection sizes, though now with surrounding inflammatory changes, likely 2/2 recent aspiration.     - f/u cultures, amylase, other labs  - continue creon  - Meropenem and Diflucan started (10/30 - 11/13) per ID recs  - pain control - Dilaudid 2 mg Q2H PRN; scheduled Tylenol, gabapentin 600 mg TID; MS contin 30 mg BID         "

## 2023-11-04 NOTE — PROGRESS NOTES
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 He has been in and out of the hospital for the last two weeks. His most recent discharge was on10/19 after admission for recurrent symptoms of his acute necrotizing pancreatitis. He was discharged home on MSIR and Morphine which was working well until yesterday morning (10/21) when he started having increased abdominal pain w/nausea. He attempted to eat something but vomited. He denies any recent alcohol or tobacco use. He reports eating normally since discharge. Last meal was a grilled cheese yesterday for dinner. Last BM was loose and "oily" but normal color w/out blood present. He was evaluated by AES on his 10/5 admission who recommended against any intervention on the pancreatic collection due to immaturity. He has been on IV Cefepime since 10/5 with possible end date 11/2.     In the ED, he was afebrile and tachycardic to the 130s. Labs were notable for K 3.3, Bicarb 21, BUN 5, Alb 3.0, Lipase 96. Lactate WNL. Repeat CT A/P showed small R pleural effusion but no acute intra-abdominopelvic abnormality. Peripancreatic fluid collection at the pancreatic tail stable from previous imaging. He was given Dilaudid 2mg x1, Dilaudid 1mg x2 for pain and his evening dose of Cefepime. Hospital Medicine was called for admission of     Overview/Hospital Course:  Admitted to hospital medicine. Cefepime and MS contin continued, with " "addition of scheduled tylenol + toradol and then breakthrough dilaudid. AES consulted again now that imaging findings were stable >4w since initial diagnosis and persistent symptoms. Underwent EUS with findings of stable fluid collection which was not drained ("Complex collections along the neck, body, and tail of pancreas (body/tail region likely connected). Not suitable for endoscopic transmural stenting/drainage.") He will need follow-up CT scan in 4-6 weeks. Cefepime to complete 11/2/23. Patient with increasing pain and inadequate pain control despite dilaudid 2mg q4h w/ MS Contin 15mg bid and multimodal pain medication. A repeat CT Abd/Pelvis on 10/29 showed similar prior fluid collection with new fluid along the posterior aspect of the liver causing IVC compression. Patients case discussed with both IR and AES, both of whom do not feel he is a good candidate for drainage, either percutaneously/transhepatic/transpleural or endoscopically. ID consulted and antibiotics broadened to Meropenem + Diflucan on 10/30 after fevers on evening of 10/29. MS Contin increased to 30 mg BID. He underwent IR aspiration of his older collection on 11/2 w/ 7cc fluid aspirated. He developed worsening abdominal pain after his procedure, labs pending, will increase pain medication and repeat CT-scan abd.    Interval History: Interval abdominal CT with roughly stable abdominal findings other than anterior collection aspiration. New findings include Large right sided pleural effusion. CXR with complete volume loss of R lower lobe; Clinically, patient requiring 2-3L NC since yesterday afternoon. Also reports mild pleuritic chest pain of his lower right chest wall. Abdominal pain persists.     Review of Systems   Constitutional:  Negative for appetite change, chills, fatigue and fever.   HENT:  Negative for congestion and sore throat.    Respiratory:  Positive for shortness of breath. Negative for cough, choking, chest tightness and " wheezing.    Cardiovascular:  Negative for chest pain and palpitations.   Gastrointestinal:  Positive for abdominal pain and nausea. Negative for constipation, diarrhea and vomiting.   Genitourinary:  Negative for dysuria and flank pain.   Musculoskeletal:  Positive for back pain. Negative for joint swelling.   Neurological:  Negative for dizziness and headaches.   Psychiatric/Behavioral:  Negative for confusion. The patient is not nervous/anxious.      Objective:     Vital Signs (Most Recent):  Temp: 98.7 °F (37.1 °C) (11/04/23 0753)  Pulse: 106 (11/04/23 0922)  Resp: 20 (11/04/23 1009)  BP: 129/78 (11/04/23 0753)  SpO2: 96 % (11/04/23 0922) Vital Signs (24h Range):  Temp:  [97.6 °F (36.4 °C)-99 °F (37.2 °C)] 98.7 °F (37.1 °C)  Pulse:  [] 106  Resp:  [14-21] 20  SpO2:  [87 %-98 %] 96 %  BP: (118-151)/(76-90) 129/78     Weight: 68 kg (150 lb)  Body mass index is 21.52 kg/m².    Intake/Output Summary (Last 24 hours) at 11/4/2023 1044  Last data filed at 11/4/2023 0800  Gross per 24 hour   Intake 100 ml   Output --   Net 100 ml         Physical Exam  HENT:      Head: Normocephalic.      Nose: Nose normal.      Mouth/Throat:      Mouth: Mucous membranes are moist.   Eyes:      Pupils: Pupils are equal, round, and reactive to light.   Cardiovascular:      Rate and Rhythm: Normal rate.   Pulmonary:      Effort: Pulmonary effort is normal.      Breath sounds: No rales.      Comments: Decreased breath sounds of right lower lung zones. No rales or wheezing.   Abdominal:      General: Abdomen is flat. There is no distension.      Palpations: There is no mass.      Tenderness: There is abdominal tenderness. There is guarding (voluntary). There is no right CVA tenderness, left CVA tenderness or rebound.   Musculoskeletal:         General: Normal range of motion.      Cervical back: Normal range of motion.      Right lower leg: No edema.      Left lower leg: No edema.   Skin:     General: Skin is warm.      Capillary  "Refill: Capillary refill takes less than 2 seconds.      Findings: No lesion.   Neurological:      General: No focal deficit present.      Mental Status: He is alert and oriented to person, place, and time.   Psychiatric:         Mood and Affect: Mood normal.             Significant Labs: All pertinent labs within the past 24 hours have been reviewed.    Significant Imaging: I have reviewed all pertinent imaging results/findings within the past 24 hours.    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 Lipase mildly elevated. WBC and lactate WNL. On IV Cefepime for 4wk course. Discharged on MSIR and Morphine for pain control. EUS (10/23/23) "complex collections along the neck, body, and tail of pancreas. Not suitable for endoscopic transmural stenting/drainage." Scheduled for follow up CT in 4-6 weeks. A repeat CT was obtained on 10/29 due to worsening abdominal pain despite high opioid doses, showing new fluid collection pressing on IVC. Discussed with AES, who will not be able to drain new collection endoscopically, and IR, who does not have a good sampling window and is reluctant to use transhepatic/transpleural methods 2/2 creating infectious tract. General surgery consulted though no plans for acute intervention. Abx broadened from cefepime to meropenem + diflucan after fevering on 10/29; ID consulted. Bcx's (10/30) remain NGTD. S/p IR aspiration w/ 7 cc output on 11/2, will; negative gram stain, other labs remain pending. Course complicated by R-sided pleural effusion. Repeat abdominal CT with somewhat interval decrease of collection sizes, though now with surrounding inflammatory changes, likely 2/2 recent aspiration.     - f/u cultures, amylase, other labs  - continue " creon  - Meropenem and Diflucan started (10/30 - 11/13) per ID recs  - pain control - Dilaudid 2 mg Q2H PRN; scheduled Tylenol, gabapentin 600 mg TID; MS contin 30 mg BID           Pleural effusion  Patient found to have large pleural effusion on imaging. I have personally reviewed and interpreted the following imaging: Xray and CT. A thoracentesis was deferred, at this time. Differentials for etiology include third spacing 2/2 low oncotic pressure, pancreaticopleural fistula following aspiration, among others. Slight improvement on repeat CXR (11/4 AM) after diuresis on 11/3.     - Lasix 40 mg again today  - Monitor urine output  - If lack of improvement clinically or on imaging, may consider thoracentesis for both therapeutic and diagnostic reasons    Moderate malnutrition  Nutrition consulted. Most recent weight and BMI monitored-     Measurements:  Wt Readings from Last 1 Encounters:   10/26/23 68 kg (150 lb)   Body mass index is 21.52 kg/m².    Patient has been screened and assessed by RD.    Malnutrition Type:  Context: chronic illness  Level:      Malnutrition Characteristic Summary:  Weight Loss (Malnutrition): 5% in 1 month  Energy Intake (Malnutrition): less than 75% for greater than or equal to 1 month    Interventions/Recommendations (treatment strategy):  1. When medically able, ADAT Regular diet  2. Add Boost Breeze TID. Modify to Boost Plus (vanilla) when diet advanced.  3. RD to monitor and follow      Necrotizing pancreatitis  Plan as above      Sepsis  This patient does have evidence of infective focus  My overall impression is sepsis.  Source: Abdominal  Antibiotics given-   Antibiotics (72h ago, onward)      Start     Stop Route Frequency Ordered    11/01/23 1000  meropenem (MERREM) 1 g in sodium chloride 0.9 % 100 mL IVPB (MB+)         11/13/23 0159 IV Every 8 hours (non-standard times) 11/01/23 0855          Latest lactate reviewed-  Recent Labs   Lab 11/04/23  0636   LACTATE 0.6       Fluid  challenge Not needed - patient is not hypotensive      Post- resuscitation assessment No - Post resuscitation assessment not needed       Will Not start Pressors- Levophed for MAP of 65  Source control achieved by: Meropenem, Diflucan    Therapeutic opioid-induced constipation (OIC)  Patient with extended course of high-potency opioids since being hospitalized multiple times for pancreatitis, starting on 9/17/23. KUB (10/27/23) notable for high-stool burden, though without focal dilatation, transition points, or free air. Patient with increased abdominal pain that is different in character and location from description of pain on admission, now including dull, achey pain in belt like distribution with intermittent nausea. Interval CT abdomen (10/30) with decreased stool burden following lactulose multiple times daily.     - Will resume bowel regimen of pericolace BID and miralax BID   - can add lactulose/enemas if needed    Pancreatic pseudocyst/cyst  Plan as above    Hypokalemia  K 3.3 on admission; likely 2/2 to decreased PO intake    - Gave IV K 10meq Q1H for 6 doses   - Replete K PRN       Continuous severe abdominal pain  Likely 2/2 to pancreatitis; Discharged home on Morphine taper which was working well until yesterday morning    - See Chronic Pancreatitis       Alcohol use disorder  Hx of EtOH use disorder. On most recent admission, patient's ethanol negative in setting of pancreatitis flare.   - monitor for signs of withdrawal, though patient denies EtOH use in between recent admissions      Fluid collection of pancreas  Plan as above        Splenic vein thrombosis  Splenic vein thrombus originally seen on 9/20 imaging, and persistent on most recent CT abdominal imaging. Started on DOAC at last admission, loading dose completed.     - Continue eliquis 5 mg BID        Mild intermittent asthma without complication  History of asthma requiring PRN albuterol. Has not recently been using any inhaler treatment.  "    Normocytic anemia  Admit with hemoglobin 10.2; Baseline ~ 10    Lab Results   Component Value Date    HGB 8.8 (L) 11/04/2023     - Daily CBC   - Transfuse hgb <7       Polycythemia vera  Hgb 10.5 on admission. Follows up with hematology as outpatient.   - continue to monitor daily CBC      HIV infection  This patient in known to have HIV+ status (Have detected HIV PCR but never CD4 <200 or AIDS defining illness). Labs reviewed- No results found for: "CD4", No results found for: "HIVDNAPCR". Patient is on HAART. Will continue HAART. Continue to monitor routine labs. Last CD4 count was   Lab Results   Component Value Date    ABSOLUTECD4 1080 10/12/2023     - Continue home Biktarvy         VTE Risk Mitigation (From admission, onward)           Ordered     apixaban tablet 5 mg  2 times daily         10/24/23 0744                    Discharge Planning   CASTILLO: 11/5/2023     Code Status: Full Code   Is the patient medically ready for discharge?:     Reason for patient still in hospital (select all that apply): Treatment  Discharge Plan A: Home with family          Noah Trinh MD  Department of Hospital Medicine   [facility] - Intensive Care ([place] [id number])    "

## 2023-11-04 NOTE — PATIENT CARE CONFERENCE
[id number] TIM   Tasia Cardona  :2000     AGE:23 y.o.     SEX:male      STATUS:Full Code      ISOLATION:No active isolations   ALLERGIES:[place], Pcn [penicillins], Pecan nut, and Sulfa (sulfonamide antibiotics)   ADMIT DATE:  10/21/2023   PHYSICIAN:  Srini Shah MD   DIAGNOSIS: Tachycardia [R00.0]  Necrotizing pancreatitis [K85.91]  Chest pain [R07.9]  Abdominal pain, unspecified abdominal location [R10.9]  HIV infection, unspecified symptom status [B20]  Acute pancreatic fluid collection [K86.89] Chronic pancreatitis  CC: Abdominal Pain (C/o abdominal pain starting this am. Hx chronic pancreatitis)    CONSULTS: jospital medicine   Past Medical History:   Diagnosis Date    Acute necrotizing pancreatitis 2023    Alcohol use disorder 10/05/2023    Asthma     Hepatitis C antibody positive in blood 2023 PCR negative    HIV infection 10/2021    Corinne-Chauhan tear 2023    Normocytic anemia 2023    Pancreatic pseudocyst/cyst 10/24/2023    Polycythemia vera 2021    Receives phlebotomy if Hct>45    Positive CMV IgG serology 2023    Splenic vein thrombosis 2023     Scheduled procedure(s): none   Labs to Monitor (no values):   Recent Vitals:  Temp: 99 °F (37.2 °C)  Pulse: 109  Resp: 17  SpO2: (!) 94 %  BP: 132/82    Respiratory:    Cardiac: Rhythm: sinus tachycardia      Wt Readings from Last 2 Encounters:   10/26/23 68 kg (150 lb)   10/12/23 70.8 kg (156 lb)     GI/: Diet Adult Regular (IDDSI Level 7)   Last Bowel Movement: 10/29/23    Neuro: WDL  Venegas catheter: No  Skin:WDL   Saqib Score: 21      Lines/Drains/Airways       Peripheral Intravenous Line  Duration                  Midline Catheter Insertion/Assessment  - Single Lumen 10/10/23 1120 Right brachial vein 18g x 10cm 24 days                  Antibiotics (From admission, onward)      Start     Stop Route Frequency Ordered    23 1000  meropenem (MERREM) 1 g in sodium chloride 0.9 % 100 mL IVPB  "(MB+)         11/13/23 0159 IV Every 8 hours (non-standard times) 11/01/23 0855    10/22/23 2100  mupirocin 2 % ointment         10/27/23 2059 Nasl 2 times daily 10/22/23 1455          VTE Risk Mitigation (From admission, onward)           Ordered     apixaban tablet 5 mg  2 times daily         10/24/23 0744                  Glycemic control:No results for input(s): "POCTGLUCOSE" in the last 168 hours.  Fall risk: standard interventions  Mobility: GEMS ([place] Early Mobility Scale): Level 4-Independent activities    Nursing Update/Plan of Care: Patient in bed resting, call light within reach, bed in low position, patient got pain medication q2hr throughout the night with very little relief. No acute concerns at this time.     "

## 2023-11-04 NOTE — ASSESSMENT & PLAN NOTE
Admit with hemoglobin 10.2; Baseline ~ 10    Lab Results   Component Value Date    HGB 8.8 (L) 11/04/2023     - Daily CBC   - Transfuse hgb <7

## 2023-11-04 NOTE — PLAN OF CARE
Pt dx chronic pancreatitis. AAOX4. C/o mild pain at present time to abd. Pallor. Tenderness noted to all quadrants of abd. moreso in mid upper area. RUE midline intact secured and flushed. Explained plan of care, voiced understanding. Callbell within reach.

## 2023-11-04 NOTE — PLAN OF CARE
PCA dilaudid effective for pain control. Reinforced plan of care. Stable. See flowsheet for further documentation.

## 2023-11-04 NOTE — PLAN OF CARE
Problem: Adult Inpatient Plan of Care  Goal: Plan of Care Review  Outcome: Ongoing, Progressing  Goal: Patient-Specific Goal (Individualized)  Outcome: Ongoing, Progressing  Goal: Absence of Hospital-Acquired Illness or Injury  Outcome: Ongoing, Progressing  Goal: Optimal Comfort and Wellbeing  Outcome: Ongoing, Progressing  Goal: Readiness for Transition of Care  Outcome: Ongoing, Progressing     Problem: Nausea and Vomiting  Goal: Fluid and Electrolyte Balance  Outcome: Ongoing, Progressing     Problem: Fluid Imbalance (Pancreatitis)  Goal: Fluid Balance  Outcome: Ongoing, Progressing     Problem: Infection (Pancreatitis)  Goal: Infection Symptom Resolution  Outcome: Ongoing, Progressing     Problem: Nutrition Impaired (Pancreatitis)  Goal: Optimal Nutrition Intake  Outcome: Ongoing, Progressing     Problem: Pain (Pancreatitis)  Goal: Acceptable Pain Control  Outcome: Ongoing, Progressing     Problem: Respiratory Compromise (Pancreatitis)  Goal: Effective Oxygenation and Ventilation  Outcome: Ongoing, Progressing     Problem: Adjustment to Illness (Sepsis/Septic Shock)  Goal: Optimal Coping  Outcome: Ongoing, Progressing     Problem: Bleeding (Sepsis/Septic Shock)  Goal: Absence of Bleeding  Outcome: Ongoing, Progressing     Problem: Glycemic Control Impaired (Sepsis/Septic Shock)  Goal: Blood Glucose Level Within Desired Range  Outcome: Ongoing, Progressing     Problem: Infection Progression (Sepsis/Septic Shock)  Goal: Absence of Infection Signs and Symptoms  Outcome: Ongoing, Progressing     Problem: Nutrition Impaired (Sepsis/Septic Shock)  Goal: Optimal Nutrition Intake  Outcome: Ongoing, Progressing     Problem: Fall Injury Risk  Goal: Absence of Fall and Fall-Related Injury  Outcome: Ongoing, Progressing

## 2023-11-04 NOTE — SUBJECTIVE & OBJECTIVE
Interval History: Interval abdominal CT with roughly stable abdominal findings other than anterior collection aspiration. New findings include Large right sided pleural effusion. CXR with complete volume loss of R lower lobe; Clinically, patient requiring 2-3L NC since yesterday afternoon. Also reports mild pleuritic chest pain of his lower right chest wall. Abdominal pain persists.     Review of Systems   Constitutional:  Negative for appetite change, chills, fatigue and fever.   HENT:  Negative for congestion and sore throat.    Respiratory:  Positive for shortness of breath. Negative for cough, choking, chest tightness and wheezing.    Cardiovascular:  Negative for chest pain and palpitations.   Gastrointestinal:  Positive for abdominal pain and nausea. Negative for constipation, diarrhea and vomiting.   Genitourinary:  Negative for dysuria and flank pain.   Musculoskeletal:  Positive for back pain. Negative for joint swelling.   Neurological:  Negative for dizziness and headaches.   Psychiatric/Behavioral:  Negative for confusion. The patient is not nervous/anxious.      Objective:     Vital Signs (Most Recent):  Temp: 98.7 °F (37.1 °C) (11/04/23 0753)  Pulse: 106 (11/04/23 0922)  Resp: 20 (11/04/23 1009)  BP: 129/78 (11/04/23 0753)  SpO2: 96 % (11/04/23 0922) Vital Signs (24h Range):  Temp:  [97.6 °F (36.4 °C)-99 °F (37.2 °C)] 98.7 °F (37.1 °C)  Pulse:  [] 106  Resp:  [14-21] 20  SpO2:  [87 %-98 %] 96 %  BP: (118-151)/(76-90) 129/78     Weight: 68 kg (150 lb)  Body mass index is 21.52 kg/m².    Intake/Output Summary (Last 24 hours) at 11/4/2023 1044  Last data filed at 11/4/2023 0800  Gross per 24 hour   Intake 100 ml   Output --   Net 100 ml         Physical Exam  HENT:      Head: Normocephalic.      Nose: Nose normal.      Mouth/Throat:      Mouth: Mucous membranes are moist.   Eyes:      Pupils: Pupils are equal, round, and reactive to light.   Cardiovascular:      Rate and Rhythm: Normal rate.    Pulmonary:      Effort: Pulmonary effort is normal.      Breath sounds: No rales.      Comments: Decreased breath sounds of right lower lung zones. No rales or wheezing.   Abdominal:      General: Abdomen is flat. There is no distension.      Palpations: There is no mass.      Tenderness: There is abdominal tenderness. There is guarding (voluntary). There is no right CVA tenderness, left CVA tenderness or rebound.   Musculoskeletal:         General: Normal range of motion.      Cervical back: Normal range of motion.      Right lower leg: No edema.      Left lower leg: No edema.   Skin:     General: Skin is warm.      Capillary Refill: Capillary refill takes less than 2 seconds.      Findings: No lesion.   Neurological:      General: No focal deficit present.      Mental Status: He is alert and oriented to person, place, and time.   Psychiatric:         Mood and Affect: Mood normal.             Significant Labs: All pertinent labs within the past 24 hours have been reviewed.    Significant Imaging: I have reviewed all pertinent imaging results/findings within the past 24 hours.

## 2023-11-04 NOTE — ASSESSMENT & PLAN NOTE
This patient does have evidence of infective focus  My overall impression is sepsis.  Source: Abdominal  Antibiotics given-   Antibiotics (72h ago, onward)    Start     Stop Route Frequency Ordered    11/01/23 1000  meropenem (MERREM) 1 g in sodium chloride 0.9 % 100 mL IVPB (MB+)         11/13/23 0159 IV Every 8 hours (non-standard times) 11/01/23 0855        Latest lactate reviewed-  Recent Labs   Lab 11/04/23  0636   LACTATE 0.6       Fluid challenge Not needed - patient is not hypotensive      Post- resuscitation assessment No - Post resuscitation assessment not needed       Will Not start Pressors- Levophed for MAP of 65  Source control achieved by: Meropenem, Diflucan

## 2023-11-04 NOTE — ASSESSMENT & PLAN NOTE
Patient found to have large pleural effusion on imaging. I have personally reviewed and interpreted the following imaging: Xray and CT. A thoracentesis was deferred, at this time. Differentials for etiology include third spacing 2/2 low oncotic pressure, pancreaticopleural fistula following aspiration, among others. Slight improvement on repeat CXR (11/4 AM) after diuresis on 11/3.     - Lasix 40 mg again today  - Monitor urine output  - If lack of improvement clinically or on imaging, may consider thoracentesis for both therapeutic and diagnostic reasons

## 2023-11-05 PROBLEM — J96.01 ACUTE HYPOXEMIC RESPIRATORY FAILURE: Status: ACTIVE | Noted: 2023-11-05

## 2023-11-05 LAB
ALBUMIN SERPL BCP-MCNC: 2.7 G/DL (ref 3.5–5.2)
ALP SERPL-CCNC: 63 U/L (ref 55–135)
ALT SERPL W/O P-5'-P-CCNC: 6 U/L (ref 10–44)
ANION GAP SERPL CALC-SCNC: 9 MMOL/L (ref 8–16)
AST SERPL-CCNC: 11 U/L (ref 10–40)
BASOPHILS # BLD AUTO: 0.03 K/UL (ref 0–0.2)
BASOPHILS NFR BLD: 0.4 % (ref 0–1.9)
BILIRUB SERPL-MCNC: 0.3 MG/DL (ref 0.1–1)
BUN SERPL-MCNC: 5 MG/DL (ref 6–20)
CALCIUM SERPL-MCNC: 9 MG/DL (ref 8.7–10.5)
CHLORIDE SERPL-SCNC: 99 MMOL/L (ref 95–110)
CO2 SERPL-SCNC: 29 MMOL/L (ref 23–29)
CREAT SERPL-MCNC: 0.6 MG/DL (ref 0.5–1.4)
DIFFERENTIAL METHOD: ABNORMAL
EOSINOPHIL # BLD AUTO: 0.6 K/UL (ref 0–0.5)
EOSINOPHIL NFR BLD: 8.4 % (ref 0–8)
ERYTHROCYTE [DISTWIDTH] IN BLOOD BY AUTOMATED COUNT: 18.1 % (ref 11.5–14.5)
EST. GFR  (NO RACE VARIABLE): >60 ML/MIN/1.73 M^2
GLUCOSE SERPL-MCNC: 101 MG/DL (ref 70–110)
HCT VFR BLD AUTO: 27.9 % (ref 40–54)
HGB BLD-MCNC: 8.6 G/DL (ref 14–18)
IMM GRANULOCYTES # BLD AUTO: 0.02 K/UL (ref 0–0.04)
IMM GRANULOCYTES NFR BLD AUTO: 0.3 % (ref 0–0.5)
LYMPHOCYTES # BLD AUTO: 2.1 K/UL (ref 1–4.8)
LYMPHOCYTES NFR BLD: 29.8 % (ref 18–48)
MAGNESIUM SERPL-MCNC: 1.9 MG/DL (ref 1.6–2.6)
MCH RBC QN AUTO: 26.6 PG (ref 27–31)
MCHC RBC AUTO-ENTMCNC: 30.8 G/DL (ref 32–36)
MCV RBC AUTO: 86 FL (ref 82–98)
MONOCYTES # BLD AUTO: 0.6 K/UL (ref 0.3–1)
MONOCYTES NFR BLD: 8.2 % (ref 4–15)
NEUTROPHILS # BLD AUTO: 3.7 K/UL (ref 1.8–7.7)
NEUTROPHILS NFR BLD: 52.9 % (ref 38–73)
NRBC BLD-RTO: 0 /100 WBC
PHOSPHATE SERPL-MCNC: 4.3 MG/DL (ref 2.7–4.5)
PLATELET # BLD AUTO: 307 K/UL (ref 150–450)
PMV BLD AUTO: 9.8 FL (ref 9.2–12.9)
POTASSIUM SERPL-SCNC: 3.7 MMOL/L (ref 3.5–5.1)
PROT SERPL-MCNC: 6.5 G/DL (ref 6–8.4)
RBC # BLD AUTO: 3.23 M/UL (ref 4.6–6.2)
SODIUM SERPL-SCNC: 137 MMOL/L (ref 136–145)
WBC # BLD AUTO: 6.92 K/UL (ref 3.9–12.7)

## 2023-11-05 PROCEDURE — 25000003 PHARM REV CODE 250: Performed by: STUDENT IN AN ORGANIZED HEALTH CARE EDUCATION/TRAINING PROGRAM

## 2023-11-05 PROCEDURE — 94640 AIRWAY INHALATION TREATMENT: CPT

## 2023-11-05 PROCEDURE — 99900035 HC TECH TIME PER 15 MIN (STAT)

## 2023-11-05 PROCEDURE — 36415 COLL VENOUS BLD VENIPUNCTURE: CPT

## 2023-11-05 PROCEDURE — 99223 PR INITIAL HOSPITAL CARE,LEVL III: ICD-10-PCS | Mod: ,,, | Performed by: INTERNAL MEDICINE

## 2023-11-05 PROCEDURE — 63600175 PHARM REV CODE 636 W HCPCS

## 2023-11-05 PROCEDURE — 99223 1ST HOSP IP/OBS HIGH 75: CPT | Mod: ,,, | Performed by: INTERNAL MEDICINE

## 2023-11-05 PROCEDURE — 25000003 PHARM REV CODE 250

## 2023-11-05 PROCEDURE — 84100 ASSAY OF PHOSPHORUS: CPT

## 2023-11-05 PROCEDURE — 94761 N-INVAS EAR/PLS OXIMETRY MLT: CPT

## 2023-11-05 PROCEDURE — 83735 ASSAY OF MAGNESIUM: CPT

## 2023-11-05 PROCEDURE — 27000221 HC OXYGEN, UP TO 24 HOURS

## 2023-11-05 PROCEDURE — 99233 SBSQ HOSP IP/OBS HIGH 50: CPT | Mod: ,,, | Performed by: HOSPITALIST

## 2023-11-05 PROCEDURE — 80053 COMPREHEN METABOLIC PANEL: CPT

## 2023-11-05 PROCEDURE — 85025 COMPLETE CBC W/AUTO DIFF WBC: CPT

## 2023-11-05 PROCEDURE — 25000242 PHARM REV CODE 250 ALT 637 W/ HCPCS: Performed by: STUDENT IN AN ORGANIZED HEALTH CARE EDUCATION/TRAINING PROGRAM

## 2023-11-05 PROCEDURE — 25000003 PHARM REV CODE 250: Performed by: INTERNAL MEDICINE

## 2023-11-05 PROCEDURE — 20600001 HC STEP DOWN PRIVATE ROOM

## 2023-11-05 PROCEDURE — 99233 PR SUBSEQUENT HOSPITAL CARE,LEVL III: ICD-10-PCS | Mod: ,,, | Performed by: HOSPITALIST

## 2023-11-05 PROCEDURE — 63600175 PHARM REV CODE 636 W HCPCS: Performed by: STUDENT IN AN ORGANIZED HEALTH CARE EDUCATION/TRAINING PROGRAM

## 2023-11-05 RX ORDER — FUROSEMIDE 10 MG/ML
20 INJECTION INTRAMUSCULAR; INTRAVENOUS ONCE
Status: COMPLETED | OUTPATIENT
Start: 2023-11-05 | End: 2023-11-05

## 2023-11-05 RX ADMIN — ONDANSETRON 4 MG: 2 INJECTION INTRAMUSCULAR; INTRAVENOUS at 08:11

## 2023-11-05 RX ADMIN — SENNOSIDES AND DOCUSATE SODIUM 1 TABLET: 8.6; 5 TABLET ORAL at 09:11

## 2023-11-05 RX ADMIN — PANTOPRAZOLE SODIUM 40 MG: 40 TABLET, DELAYED RELEASE ORAL at 09:11

## 2023-11-05 RX ADMIN — FLUCONAZOLE 400 MG: 200 TABLET ORAL at 08:11

## 2023-11-05 RX ADMIN — MEROPENEM 1 G: 1 INJECTION INTRAVENOUS at 05:11

## 2023-11-05 RX ADMIN — HYDROXYZINE PAMOATE 25 MG: 25 CAPSULE ORAL at 09:11

## 2023-11-05 RX ADMIN — MEROPENEM 1 G: 1 INJECTION INTRAVENOUS at 01:11

## 2023-11-05 RX ADMIN — SENNOSIDES AND DOCUSATE SODIUM 1 TABLET: 8.6; 5 TABLET ORAL at 08:11

## 2023-11-05 RX ADMIN — GABAPENTIN 600 MG: 300 CAPSULE ORAL at 03:11

## 2023-11-05 RX ADMIN — PANCRELIPASE 2 CAPSULE: 30000; 6000; 19000 CAPSULE, DELAYED RELEASE PELLETS ORAL at 08:11

## 2023-11-05 RX ADMIN — PANTOPRAZOLE SODIUM 40 MG: 40 TABLET, DELAYED RELEASE ORAL at 08:11

## 2023-11-05 RX ADMIN — MEROPENEM 1 G: 1 INJECTION INTRAVENOUS at 08:11

## 2023-11-05 RX ADMIN — APIXABAN 5 MG: 5 TABLET, FILM COATED ORAL at 08:11

## 2023-11-05 RX ADMIN — PANCRELIPASE 2 CAPSULE: 30000; 6000; 19000 CAPSULE, DELAYED RELEASE PELLETS ORAL at 01:11

## 2023-11-05 RX ADMIN — PROMETHAZINE HYDROCHLORIDE 6.25 MG: 25 INJECTION INTRAMUSCULAR; INTRAVENOUS at 02:11

## 2023-11-05 RX ADMIN — Medication: at 05:11

## 2023-11-05 RX ADMIN — Medication 6 MG: at 09:11

## 2023-11-05 RX ADMIN — GABAPENTIN 600 MG: 300 CAPSULE ORAL at 08:11

## 2023-11-05 RX ADMIN — IPRATROPIUM BROMIDE AND ALBUTEROL SULFATE 3 ML: .5; 3 SOLUTION RESPIRATORY (INHALATION) at 09:11

## 2023-11-05 RX ADMIN — FOLIC ACID 1 MG: 1 TABLET ORAL at 08:11

## 2023-11-05 RX ADMIN — FUROSEMIDE 20 MG: 10 INJECTION, SOLUTION INTRAMUSCULAR; INTRAVENOUS at 11:11

## 2023-11-05 RX ADMIN — IPRATROPIUM BROMIDE AND ALBUTEROL SULFATE 3 ML: .5; 3 SOLUTION RESPIRATORY (INHALATION) at 07:11

## 2023-11-05 RX ADMIN — BICTEGRAVIR SODIUM, EMTRICITABINE, AND TENOFOVIR ALAFENAMIDE FUMARATE 1 TABLET: 50; 200; 25 TABLET ORAL at 08:11

## 2023-11-05 RX ADMIN — PANCRELIPASE 2 CAPSULE: 30000; 6000; 19000 CAPSULE, DELAYED RELEASE PELLETS ORAL at 05:11

## 2023-11-05 RX ADMIN — Medication: at 04:11

## 2023-11-05 RX ADMIN — IPRATROPIUM BROMIDE AND ALBUTEROL SULFATE 3 ML: .5; 3 SOLUTION RESPIRATORY (INHALATION) at 04:11

## 2023-11-05 RX ADMIN — APIXABAN 5 MG: 5 TABLET, FILM COATED ORAL at 09:11

## 2023-11-05 RX ADMIN — GABAPENTIN 600 MG: 300 CAPSULE ORAL at 09:11

## 2023-11-05 NOTE — SUBJECTIVE & OBJECTIVE
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       Past Surgical History:   Procedure Laterality Date    ENDOSCOPIC ULTRASOUND OF UPPER GASTROINTESTINAL TRACT N/A 10/23/2023    Procedure: ULTRASOUND, UPPER GI TRACT, ENDOSCOPIC;  Surgeon: Emil Villatoro MD;  Location: [facility] ([address] [place] [address]);  Service: Endoscopy;  Laterality: N/A;    ERCP N/A 10/23/2023    Procedure: ERCP (ENDOSCOPIC RETROGRADE CHOLANGIOPANCREATOGRAPHY);  Surgeon: Emil Villatoro MD;  Location: [facility] ([address] [place] [address]);  Service: Endoscopy;  Laterality: N/A;    ESOPHAGOGASTRODUODENOSCOPY N/A 9/18/2023    Procedure: EGD (ESOPHAGOGASTRODUODENOSCOPY);  Surgeon: Kg Cleaning MD;  Location: [facility] ([address] [place] [address]);  Service: Endoscopy;  Laterality: N/A;       Review of patient's allergies indicates:   Allergen Reactions    [place] Swelling    Pcn [penicillins] Hives     Tolerates cephalosporins    Pecan nut Swelling    Sulfa (sulfonamide antibiotics) Hives       Family History       Problem Relation (Age of Onset)    Breast cancer Maternal Grandmother    Cancer Mother, Brother    No Known Problems Father    Ovarian cancer Mother    Pancreatitis Mother          Tobacco Use    Smoking status: Former     Types: Cigarettes    Smokeless tobacco: Never   Substance and Sexual Activity    Alcohol use: Not Currently    Drug use: Never    Sexual activity: Not on file         Review of Systems   Constitutional:  Negative for chills and fever.   Respiratory:  Positive for shortness of breath. Negative for cough.    Cardiovascular:  Negative for chest  pain, palpitations and leg swelling.   Gastrointestinal:  Positive for abdominal pain.   Genitourinary:  Negative for dysuria.   Psychiatric/Behavioral:  Negative for agitation and confusion.      Objective:     Vital Signs (Most Recent):  Temp: 98.3 °F (36.8 °C) (11/05/23 0815)  Pulse: 98 (11/05/23 1608)  Resp: 16 (11/05/23 1608)  BP: 121/78 (11/05/23 0815)  SpO2: 97 % (11/05/23 1608) Vital Signs (24h Range):  Temp:  [98.3 °F (36.8 °C)-99.5 °F (37.5 °C)] 98.3 °F (36.8 °C)  Pulse:  [] 98  Resp:  [8-21] 16  SpO2:  [93 %-97 %] 97 %  BP: (112-121)/(75-78) 121/78     Weight: 68 kg (150 lb)  Body mass index is 21.52 kg/m².      Intake/Output Summary (Last 24 hours) at 11/5/2023 1619  Last data filed at 11/5/2023 1609  Gross per 24 hour   Intake 1100 ml   Output 3200 ml   Net -2100 ml        Physical Exam  Vitals reviewed.   Constitutional:       General: He is not in acute distress.     Appearance: He is not ill-appearing.   HENT:      Head: Normocephalic and atraumatic.   Eyes:      Conjunctiva/sclera: Conjunctivae normal.   Cardiovascular:      Rate and Rhythm: Normal rate and regular rhythm.      Pulses: Normal pulses.      Heart sounds: No murmur heard.  Pulmonary:      Effort: Pulmonary effort is normal. No respiratory distress.      Breath sounds: No wheezing or rhonchi.      Comments: Decrease breath sounds to the right lower lung field   Abdominal:      General: There is distension.   Musculoskeletal:      Right lower leg: No edema.      Left lower leg: No edema.   Neurological:      General: No focal deficit present.      Mental Status: He is alert and oriented to person, place, and time.   Psychiatric:         Mood and Affect: Mood normal.         Behavior: Behavior normal.          Vents:  Oxygen Concentration (%): 28 (11/03/23 2107)    Lines/Drains/Airways       Peripheral Intravenous Line  Duration                  Midline Catheter Insertion/Assessment  - Single Lumen 10/10/23 1120 Right brachial vein  18g x 10cm 26 days                    Significant Labs:    CBC/Anemia Profile:  Recent Labs   Lab 11/04/23  0636 11/05/23  0522   WBC 8.51 6.92   HGB 8.8* 8.6*   HCT 29.4* 27.9*    307   MCV 89 86   RDW 18.6* 18.1*        Chemistries:  Recent Labs   Lab 11/04/23  0636 11/05/23 0522    137   K 3.4* 3.7    99   CO2 23 29   BUN 7 5*   CREATININE 0.6 0.6   CALCIUM 8.3* 9.0   ALBUMIN 2.6* 2.7*   PROT 6.1 6.5   BILITOT 0.2 0.3   ALKPHOS 67 63   ALT 5* 6*   AST 12 11   MG 1.9 1.9   PHOS 4.2 4.3       All pertinent labs within the past 24 hours have been reviewed.    Significant Imaging:   I have reviewed all pertinent imaging results/findings within the past 24 hours.

## 2023-11-05 NOTE — RESPIRATORY THERAPY
"RAPID RESPONSE RESPIRATORY THERAPY ETCO2 CHECK         Time of visit: 836     Code Status: Full Code   : 2000  Bed: 48218/97507 A:   MRN: [id number]  Time spent at the bedside: < 15 min    SITUATION    Evaluated patient for: ETCo2 compliance    BACKGROUND    Why is the patient in the hospital?: Chronic pancreatitis    Patient has a past medical history of Acute necrotizing pancreatitis, Alcohol use disorder, Asthma, Hepatitis C antibody positive in blood, HIV infection, Corinne-Chauhan tear, Normocytic anemia, Pancreatic pseudocyst/cyst, Polycythemia vera, Positive CMV IgG serology, and Splenic vein thrombosis.    24 Hours Vitals Range:  Temp:  [98 °F (36.7 °C)-99.5 °F (37.5 °C)]   Pulse:  []   Resp:  [8-21]   BP: (112-121)/(75-81)   SpO2:  [93 %-97 %]     Labs:    Recent Labs     23  0523  0636 23  05    137 137   K 3.8 3.4* 3.7    100 99   CO2 21* 23 29   BUN 5* 7 5*   CREATININE 0.6 0.6 0.6   GLU 86 104 101   PHOS 4.7* 4.2 4.3   MG 1.6 1.9 1.9        No results for input(s): "PH", "PCO2", "PO2", "HCO3", "POCSATURATED", "BE" in the last 72 hours.    ASSESSMENT/INTERVENTIONS      Last VS   Temp: 98.3 °F (36.8 °C) (815)  Pulse: 93 (1120)  Resp: 12 ( 111)  BP: 121/78 (815)  SpO2: 96 % (1120)    Level of Consciousness: Level of Consciousness (AVPU): alert  Respiratory Effort: Respiratory Effort: Normal, Unlabored Expansion/Accessory Muscle Usage: Expansion/Accessory Muscles/Retractions: no use of accessory muscles, no retractions, expansion symmetric  All Lung Field Breath Sounds: All Lung Fields Breath Sounds: Anterior:, Lateral:, diminished, clear  SUSAN Breath Sounds: clear  LLL Breath Sounds: diminished  RUL Breath Sounds: clear  RLL Breath Sounds: diminished  Is the ETCO2 monitor on? Yes  Is the patient wearing a cannula? Yes  Are ETCO2 orders placed? Yes  Is the patient on a PCA pump? Yes  ETCO2 monitored: ETCO2 (mmHg): 37 mmHg  Ambu at " bedside:      Active Orders   Respiratory Care    END TIDAL CO2 MONITOR Q12H     Frequency: Q12H     Number of Occurrences: Until Specified    Incentive spirometry     Frequency: Q4H     Number of Occurrences: Until Specified    Inhalation Treatment Q6H WAKE     Frequency: Q6H WAKE     Number of Occurrences: Until Specified    Oxygen Continuous     Frequency: Continuous     Number of Occurrences: Until Specified     Order Questions:      Device type: Low flow      Device: Nasal Cannula (1- 5 Liters)      LPM: 2      Titrate O2 per Oxygen Titration Protocol: Yes      To maintain SpO2 goal of: >= 90%      Notify MD of: Inability to achieve desired SpO2; Sudden change in patient status and requires 20% increase in FiO2; Patient requires >60% FiO2    Oxygen PRN     Frequency: PRN     Number of Occurrences: Until Specified     Order Questions:      Device type: Low flow      Device: Nasal Cannula (1- 5 Liters)      LPM: 2      Titrate O2 per Oxygen Titration Protocol: Yes      To maintain SpO2 goal of: >= 92%      Notify MD of: Inability to achieve desired SpO2; Sudden change in patient status and requires 20% increase in FiO2; Patient requires >60% FiO2    Pulse Oximetry Q Shift     Frequency: Q Shift     Number of Occurrences: Until Specified    Pulse Oximetry Q4H     Frequency: Q4H     Number of Occurrences: Until Specified       RECOMMENDATIONS    We recommend: RRT Recs: Continue POC per primary team.      FOLLOW-UP    Please call back the Rapid Response RT, Valerie Sargent, KADY at x [phone] for any questions or concerns.

## 2023-11-05 NOTE — PROGRESS NOTES
"[facility] - Intensive Care ([place] [address])  [facility] Medicine  Progress Note    Patient Name: Tasia Cardona  MRN: [id number]  Patient Class: IP- Inpatient   Admission Date: 10/21/2023  Length of Stay: 14 days  Attending Physician: Srini Shah MD  Primary Care Provider: Pina Jones NP        Subjective:     Principal Problem:Chronic pancreatitis        HPI:  24yo M w/PMHx of HIV (on Biktarvy, last CD4 296 from 9/20/23), EtOH use, acute necrotizing pancreatitis, splenic vein thrombosis (on Eliquis), asthma, and anemia who presents to ED for persistent abdominal pain and vomiting. He has been in and out of the hospital for the last two weeks. His most recent discharge was on10/19 after admission for recurrent symptoms of his acute necrotizing pancreatitis. He was discharged home on MSIR and Morphine which was working well until yesterday morning (10/21) when he started having increased abdominal pain w/nausea. He attempted to eat something but vomited. He denies any recent alcohol or tobacco use. He reports eating normally since discharge. Last meal was a grilled cheese yesterday for dinner. Last BM was loose and "oily" but normal color w/out blood present. He was evaluated by AES on his 10/5 admission who recommended against any intervention on the pancreatic collection due to immaturity. He has been on IV Cefepime since 10/5 with possible end date 11/2.     In the ED, he was afebrile and tachycardic to the 130s. Labs were notable for K 3.3, Bicarb 21, BUN 5, Alb 3.0, Lipase 96. Lactate WNL. Repeat CT A/P showed small R pleural effusion but no acute intra-abdominopelvic abnormality. Peripancreatic fluid collection at the pancreatic tail stable from previous imaging. He was given Dilaudid 2mg x1, Dilaudid 1mg x2 for pain and his evening dose of Cefepime. Hospital Medicine was called for admission of     Overview/Hospital Course:  Admitted to hospital medicine. Cefepime and MS contin continued, with " "addition of scheduled tylenol + toradol and then breakthrough dilaudid. AES consulted again now that imaging findings were stable >4w since initial diagnosis and persistent symptoms. Underwent EUS with findings of stable fluid collection which was not drained ("Complex collections along the neck, body, and tail of pancreas (body/tail region likely connected). Not suitable for endoscopic transmural stenting/drainage.") He will need follow-up CT scan in 4-6 weeks. Cefepime to complete 11/2/23. Patient with increasing pain and inadequate pain control despite dilaudid 2mg q4h w/ MS Contin 15mg bid and multimodal pain medication. A repeat CT Abd/Pelvis on 10/29 showed similar prior fluid collection with new fluid along the posterior aspect of the liver causing IVC compression. Patients case discussed with both IR and AES, both of whom do not feel he is a good candidate for drainage, either percutaneously/transhepatic/transpleural or endoscopically. ID consulted and antibiotics broadened to Meropenem + Diflucan on 10/30 after fevers on evening of 10/29. MS Contin increased to 30 mg BID. He underwent IR aspiration of his older collection on 11/2 w/ 7cc fluid aspirated. Fluid studies negative for infectious process at this time Repeat imaging with stable vs. Interval decrease in abdominal collections, though notable large R-sided pleural effusion. Course complicated by acute respiratory failure with effusion, possibly 2/2 SIRS/third spacing 2/2 hypoalbuminemia. Trial of diuresis yielding 3L urine output, though O2 requirements remain the same. Pulmonology consult placed on 11/5. PCA pump initiated after patient requiring 2mg dilaudid every 2H.       Interval History: Started on PCA pump yesterday afternoon after requiring 2 mg dilaudid every 2 hours. Patient still reporting persistent abdominal pain and intermittent nausea. O2 requirements remain stable on 3L NC, despite lasix and urine output of 3L overnight.     Review of " Systems   Constitutional:  Negative for appetite change, chills, fatigue and fever.   HENT:  Negative for congestion and sore throat.    Respiratory:  Positive for shortness of breath. Negative for cough, choking, chest tightness and wheezing.    Cardiovascular:  Negative for chest pain and palpitations.   Gastrointestinal:  Positive for abdominal pain and nausea. Negative for constipation, diarrhea and vomiting.   Genitourinary:  Negative for dysuria and flank pain.   Musculoskeletal:  Positive for back pain. Negative for joint swelling.   Neurological:  Negative for dizziness and headaches.   Psychiatric/Behavioral:  Negative for confusion. The patient is not nervous/anxious.      Objective:     Vital Signs (Most Recent):  Temp: 98.3 °F (36.8 °C) (11/05/23 0815)  Pulse: 101 (11/05/23 0815)  Resp: 14 (11/05/23 0815)  BP: 121/78 (11/05/23 0815)  SpO2: 95 % (11/05/23 0815) Vital Signs (24h Range):  Temp:  [98 °F (36.7 °C)-99.5 °F (37.5 °C)] 98.3 °F (36.8 °C)  Pulse:  [] 101  Resp:  [8-21] 14  SpO2:  [93 %-97 %] 95 %  BP: (112-129)/(75-87) 121/78     Weight: 68 kg (150 lb)  Body mass index is 21.52 kg/m².    Intake/Output Summary (Last 24 hours) at 11/5/2023 1056  Last data filed at 11/5/2023 0823  Gross per 24 hour   Intake 800 ml   Output 2475 ml   Net -1675 ml         Physical Exam  Vitals and nursing note reviewed.   HENT:      Head: Normocephalic.      Nose: Nose normal.      Mouth/Throat:      Mouth: Mucous membranes are moist.   Eyes:      Pupils: Pupils are equal, round, and reactive to light.   Cardiovascular:      Rate and Rhythm: Normal rate.   Pulmonary:      Effort: Pulmonary effort is normal.      Breath sounds: No rales.      Comments: Decreased breath sounds of right lower lung zones. No rales or wheezing.   Abdominal:      General: Abdomen is flat. There is no distension.      Palpations: There is no mass.      Tenderness: There is abdominal tenderness. There is guarding (voluntary). There is no  right CVA tenderness, left CVA tenderness or rebound.   Musculoskeletal:         General: Normal range of motion.      Cervical back: Normal range of motion.      Right lower leg: No edema.      Left lower leg: No edema.   Skin:     General: Skin is warm.      Capillary Refill: Capillary refill takes less than 2 seconds.      Findings: No lesion.   Neurological:      General: No focal deficit present.      Mental Status: He is alert and oriented to person, place, and time.   Psychiatric:         Mood and Affect: Mood normal.             Significant Labs: All pertinent labs within the past 24 hours have been reviewed.    Significant Imaging: I have reviewed all pertinent imaging results/findings within the past 24 hours.    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 Lipase mildly elevated. WBC and lactate WNL. On IV Cefepime for 4wk course. Recent discharge on 10/19 with MSIR and Morphine for pain control. EUS (10/23/23) complex collections along the neck, body, and tail of pancreas; though not suitable for endoscopic transmural stenting/drainage. Repeat CT (10/29) due to worsening abdominal pain despite high opioid doses, showing new fluid collection pressing on IVC. Discussed with AES, who will not be able to drain new collection endoscopically, and IR, who does not have a good sampling window and is reluctant to use transhepatic/transpleural methods. General surgery consulted though no plans for acute intervention. Abx broadened from cefepime to meropenem + diflucan after fevering on 10/29; ID consulted. IR reconsulted for drainage of anterior collection; s/p IR aspiration w/ 7 cc output on 11/2. Repeat abdominal CT (11/3) with interval decrease of collection sizes, though  now with surrounding inflammatory changes, likely 2/2 recent aspiration procedure and new large right sided effusion. Fluid analysis from pancreatic aspirate remains without evidence of infection at this time.     - Continue to follow cultures, amylase, other labs from pancreatic aspirate  - continue creon  - Meropenem and Diflucan started (10/30 - 11/13) per ID recs  - pain control -  gabapentin 600 mg TID; discontinued MS contin and PRN dilaudid and started on PCA pump on 11/4. Patient still without adequate pain control after basal rate of 0.2 mg/hr and bolus dose of 0.2 mg with 15 minute lockout. Will increase basal rate to 0.5 mg/hr today.            Acute hypoxemic respiratory failure  Patient found to have large pleural effusion on 11/3 CT scan, followed by CXR. Soon after, patient with new oxygen requirements and remains on 2-3L NC. Differentials for etiology include third spacing 2/2 low oncotic pressure, pancreaticopleural fistula following aspiration, among others. Slight improvement on repeat CXR (11/4 AM) after diuresis on 11/3. Urine output ~3L, though repeat CXR (11/5) without significant improvement.      - Lasix 20 mg again today  - Consult pulmonology to evaluate utility of thoracentesis after maintaining higher O2 requirements despite diuresis  - Monitor urine output      Pleural effusion  See plan for 'Acute Hypoxemic Respiratory Failure'    Moderate malnutrition  Nutrition consulted. Most recent weight and BMI monitored-     Measurements:  Wt Readings from Last 1 Encounters:   10/26/23 68 kg (150 lb)   Body mass index is 21.52 kg/m².    Patient has been screened and assessed by RD.    Malnutrition Type:  Context: chronic illness  Level:      Malnutrition Characteristic Summary:  Weight Loss (Malnutrition): 5% in 1 month  Energy Intake (Malnutrition): less than 75% for greater than or equal to 1 month    Interventions/Recommendations (treatment strategy):  1. When medically able, ADAT Regular  diet  2. Add Boost Breeze TID. Modify to Boost Plus (vanilla) when diet advanced.  3. RD to monitor and follow      Necrotizing pancreatitis  Plan as above      Sepsis  This patient does have evidence of infective focus  My overall impression is sepsis.  Source: Abdominal  Antibiotics given-   Antibiotics (72h ago, onward)      Start     Stop Route Frequency Ordered    11/01/23 1000  meropenem (MERREM) 1 g in sodium chloride 0.9 % 100 mL IVPB (MB+)         11/13/23 0159 IV Every 8 hours (non-standard times) 11/01/23 0855          Latest lactate reviewed-  Recent Labs   Lab 11/04/23  0636   LACTATE 0.6         Fluid challenge Not needed - patient is not hypotensive      Post- resuscitation assessment No - Post resuscitation assessment not needed       Will Not start Pressors- Levophed for MAP of 65  Source control achieved by: Meropenem, Diflucan    Therapeutic opioid-induced constipation (OIC)  Patient with extended course of high-potency opioids since being hospitalized multiple times for pancreatitis, starting on 9/17/23. KUB (10/27/23) notable for high-stool burden, though without focal dilatation, transition points, or free air. Patient with increased abdominal pain that is different in character and location from description of pain on admission, now including dull, achey pain in belt like distribution with intermittent nausea. Interval CT abdomen (10/30) with decreased stool burden following lactulose multiple times daily.     - Continue bowel regimen of pericolace BID and miralax BID   - can add lactulose/enemas if needed    Pancreatic pseudocyst/cyst  Plan as above    Hypokalemia  K 3.3 on admission; likely 2/2 to decreased PO intake    - Gave IV K 10meq Q1H for 6 doses   - Replete K PRN       Continuous severe abdominal pain  Likely 2/2 to pancreatitis; Discharged home on Morphine taper which was working well until yesterday morning    - See Chronic Pancreatitis       Alcohol use disorder  Hx of EtOH use  "disorder. On most recent admission, patient's ethanol negative in setting of pancreatitis flare.   - monitor for signs of withdrawal, though patient denies EtOH use in between recent admissions      Fluid collection of pancreas  Plan as above        Splenic vein thrombosis  Splenic vein thrombus originally seen on 9/20 imaging, and persistent on most recent CT abdominal imaging. Started on DOAC at last admission, loading dose completed.     - Continue eliquis 5 mg BID        Mild intermittent asthma without complication  History of asthma requiring PRN albuterol. Has not recently been using any inhaler treatment.     Normocytic anemia  Admit with hemoglobin 10.2; Baseline ~ 10    Lab Results   Component Value Date    HGB 8.6 (L) 11/05/2023     - Daily CBC   - Transfuse hgb <7       Polycythemia vera  Hgb 10.5 on admission. Follows up with hematology as outpatient.   - continue to monitor daily CBC      HIV infection  This patient in known to have HIV+ status (Have detected HIV PCR but never CD4 <200 or AIDS defining illness). Labs reviewed- No results found for: "CD4", No results found for: "HIVDNAPCR". Patient is on HAART. Will continue HAART. Continue to monitor routine labs. Last CD4 count was   Lab Results   Component Value Date    ABSOLUTECD4 1080 10/12/2023     - Continue home Biktarvy         VTE Risk Mitigation (From admission, onward)           Ordered     apixaban tablet 5 mg  2 times daily         10/24/23 0744                    Discharge Planning   CASTILLO: 11/5/2023     Code Status: Full Code   Is the patient medically ready for discharge?:     Reason for patient still in hospital (select all that apply): Treatment and Consult recommendations  Discharge Plan A: Home with family   Discharge Delays: (!) Procedure Scheduling (IR, OR, Labs, Echo, Cath, Echo, EEG)    Noah Trinh MD  Department of Hospital Medicine   [facility] - Intensive Care ([place] [id number])    "

## 2023-11-05 NOTE — ASSESSMENT & PLAN NOTE
Patient with extended course of high-potency opioids since being hospitalized multiple times for pancreatitis, starting on 9/17/23. KUB (10/27/23) notable for high-stool burden, though without focal dilatation, transition points, or free air. Patient with increased abdominal pain that is different in character and location from description of pain on admission, now including dull, achey pain in belt like distribution with intermittent nausea. Interval CT abdomen (10/30) with decreased stool burden following lactulose multiple times daily.     - Continue bowel regimen of pericolace BID and miralax BID   - can add lactulose/enemas if needed

## 2023-11-05 NOTE — PLAN OF CARE
Pt dx chronic pancreatitis. AAOX4. C/o abd pain, MD aware of continued pain. Dilaudid PCA in use, see MAR for settings. Abd tender upon palpation. Pallor. Right upper arm midline intact, secured and patent.  Voids spontaneously. Encouraged IS. Explained plan of care, voiced understanding. Questions answered.

## 2023-11-05 NOTE — CONSULTS
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 Patient was recently admitted for recurrent symptoms of acute necrotizing pancreatitis.   Patient discloses no fever, chills, chest pain but has shortness of breath that he attributed to his abdominal pain.  On presentation, patient was afebrile and hemodynamically stable. CT abdomen and pelvis showed necrotizing pancreatitis with redemonstration of the multilocular fluid collection in the upper abdomen as well as including the pancreatic body and tail, large volume right pleural effusion with associated right lower lobe atelectasis, left lower lobe small volume pleural effusion with associated atelectasis and suspected thrombus of the splenic vein with collateralization,  Patient was admitted to the hospital medicine service. Pulmonary consulted for new onset right sided effusion in patient with pancreatitis.    Past Medical History:   Diagnosis Date    Acute necrotizing pancreatitis 09/20/2023    Alcohol use disorder 10/05/2023    Asthma     Hepatitis C antibody positive in blood 09/18/2023 9/2023 PCR negative    HIV infection 10/2021    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/2023    Normocytic anemia 09/18/2023    Pancreatic pseudocyst/cyst 10/24/2023    Polycythemia vera 11/28/2021    Receives phlebotomy if  Hct>45    Positive CMV IgG serology 09/21/2023    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       Past Surgical History:   Procedure Laterality Date    ENDOSCOPIC ULTRASOUND OF UPPER GASTROINTESTINAL TRACT N/A 10/23/2023    Procedure: ULTRASOUND, UPPER GI TRACT, ENDOSCOPIC;  Surgeon: Emil Villatoro MD;  [facility]  Service: Endoscopy;  Laterality: N/A;    ERCP N/A 10/23/2023    Procedure: ERCP (ENDOSCOPIC RETROGRADE CHOLANGIOPANCREATOGRAPHY);  Surgeon: Emil Villatoro MD;  [facility]  Service: Endoscopy;  Laterality: N/A;    ESOPHAGOGASTRODUODENOSCOPY N/A 9/18/2023    Procedure: EGD (ESOPHAGOGASTRODUODENOSCOPY);  Surgeon: Kg Cleaning MD;  [facility]  Service: Endoscopy;  Laterality: N/A;       Review of patient's allergies indicates:   Allergen Reactions    [place] Swelling    Pcn [penicillins] Hives     Tolerates cephalosporins    Pecan nut Swelling    Sulfa (sulfonamide antibiotics) Hives       Family History       Problem Relation (Age of Onset)    Breast cancer Maternal Grandmother    Cancer Mother, Brother    No Known Problems Father    Ovarian cancer Mother    Pancreatitis Mother          Tobacco Use    Smoking status: Former     Types: Cigarettes    Smokeless tobacco: Never   Substance and Sexual Activity    Alcohol use: Not Currently    Drug use: Never    Sexual activity: Not on file         Review of Systems   Constitutional:  Negative for chills and fever.   Respiratory:  Positive for shortness of breath. Negative for cough.    Cardiovascular:  Negative for chest pain, palpitations and leg swelling.   Gastrointestinal:  Positive for abdominal pain.   Genitourinary:  Negative for dysuria.   Psychiatric/Behavioral:  Negative for agitation and confusion.      Objective:     Vital Signs (Most Recent):  Temp: 98.3 °F (36.8 °C) (11/05/23 0815)  Pulse: 98 (11/05/23 1608)  Resp: 16 (11/05/23 1608)  BP: 121/78 (11/05/23 0815)  SpO2: 97 % (11/05/23 1608) Vital  Signs (24h Range):  Temp:  [98.3 °F (36.8 °C)-99.5 °F (37.5 °C)] 98.3 °F (36.8 °C)  Pulse:  [] 98  Resp:  [8-21] 16  SpO2:  [93 %-97 %] 97 %  BP: (112-121)/(75-78) 121/78     Weight: 68 kg (150 lb)  Body mass index is 21.52 kg/m².      Intake/Output Summary (Last 24 hours) at 11/5/2023 1619  Last data filed at 11/5/2023 1609  Gross per 24 hour   Intake 1100 ml   Output 3200 ml   Net -2100 ml        Physical Exam  Vitals reviewed.   Constitutional:       General: He is not in acute distress.     Appearance: He is not ill-appearing.   HENT:      Head: Normocephalic and atraumatic.   Eyes:      Conjunctiva/sclera: Conjunctivae normal.   Cardiovascular:      Rate and Rhythm: Normal rate and regular rhythm.      Pulses: Normal pulses.      Heart sounds: No murmur heard.  Pulmonary:      Effort: Pulmonary effort is normal. No respiratory distress.      Breath sounds: No wheezing or rhonchi.      Comments: Decrease breath sounds to the right lower lung field   Abdominal:      General: There is distension.   Musculoskeletal:      Right lower leg: No edema.      Left lower leg: No edema.   Neurological:      General: No focal deficit present.      Mental Status: He is alert and oriented to person, place, and time.   Psychiatric:         Mood and Affect: Mood normal.         Behavior: Behavior normal.          Vents:  Oxygen Concentration (%): 28 (11/03/23 2107)    Lines/Drains/Airways       Peripheral Intravenous Line  Duration                  Midline Catheter Insertion/Assessment  - Single Lumen 10/10/23 1120 Right brachial vein 18g x 10cm 26 days                    Significant Labs:    CBC/Anemia Profile:  Recent Labs   Lab 11/04/23  0636 11/05/23  0522   WBC 8.51 6.92   HGB 8.8* 8.6*   HCT 29.4* 27.9*    307   MCV 89 86   RDW 18.6* 18.1*        Chemistries:  Recent Labs   Lab 11/04/23  0636 11/05/23  0522    137   K 3.4* 3.7    99   CO2 23 29   BUN 7 5*   CREATININE 0.6 0.6   CALCIUM 8.3* 9.0    ALBUMIN 2.6* 2.7*   PROT 6.1 6.5   BILITOT 0.2 0.3   ALKPHOS 67 63   ALT 5* 6*   AST 12 11   MG 1.9 1.9   PHOS 4.2 4.3       All pertinent labs within the past 24 hours have been reviewed.    Significant Imaging:   I have reviewed all pertinent imaging results/findings within the past 24 hours.  Assessment/Plan:     Pulmonary  Pleural effusion  Bedside POCUS showed moderate size right sided echogenic pleural effusion but there is no septation. Small left sided pleural effusion. The pleural effusion likely pancreatitis related. Thoracentensis discussed with the patient. Patient wants to monitor the fluid for now without thoracentesis. In case, patient wants the thoracentesis, he will need to be off anticoagulation before the procedure or bridge with heparin/enoxaparin before the procedure. Continue management of pancreatitits as par primary team.           Thank you for your consult. Pulmonary will sign off. Please call with questions.     Patient was seen with the attending physician Dr. Flores.     Mary Salas MD  Pulmonology  [facility] - Intensive Care ([facility]

## 2023-11-05 NOTE — ASSESSMENT & PLAN NOTE
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 Lipase mildly elevated. WBC and lactate WNL. On IV Cefepime for 4wk course. Recent discharge on 10/19 with MSIR and Morphine for pain control. EUS (10/23/23) complex collections along the neck, body, and tail of pancreas; though not suitable for endoscopic transmural stenting/drainage. Repeat CT (10/29) due to worsening abdominal pain despite high opioid doses, showing new fluid collection pressing on IVC. Discussed with AES, who will not be able to drain new collection endoscopically, and IR, who does not have a good sampling window and is reluctant to use transhepatic/transpleural methods. General surgery consulted though no plans for acute intervention. Abx broadened from cefepime to meropenem + diflucan after fevering on 10/29; ID consulted. IR reconsulted for drainage of anterior collection; s/p IR aspiration w/ 7 cc output on 11/2. Repeat abdominal CT (11/3) with interval decrease of collection sizes, though now with surrounding inflammatory changes, likely 2/2 recent aspiration procedure and new large right sided effusion. Fluid analysis from pancreatic aspirate remains without evidence of infection at this time.     - Continue to follow cultures, amylase, other labs from pancreatic aspirate  - continue creon  - Meropenem and Diflucan started (10/30 - 11/13) per ID recs  - pain control -  gabapentin 600 mg TID; discontinued MS contin and PRN dilaudid and started on PCA pump on 11/4. Patient still without adequate pain control after basal rate of 0.2 mg/hr and bolus dose of 0.2 mg with 15 minute lockout. Will increase basal rate to 0.5 mg/hr today.

## 2023-11-05 NOTE — ASSESSMENT & PLAN NOTE
Bedside POCUS showed moderate size right sided echogenic pleural effusion but there is no septation. Small left sided pleural effusion. The pleural effusion likely pancreatitis related. Thoracentensis discussed with the patient. Patient wants to monitor the fluid for now without thoracentesis. In case, patient wants the thoracentesis, he will need to be off anticoagulation before the procedure. Continue management of pancreatitits as par primary team.

## 2023-11-05 NOTE — SUBJECTIVE & OBJECTIVE
Interval History: Started on PCA pump yesterday afternoon after requiring 2 mg dilaudid every 2 hours. Patient still reporting persistent abdominal pain and intermittent nausea. O2 requirements remain stable on 3L NC, despite lasix and urine output of 3L overnight.     Review of Systems   Constitutional:  Negative for appetite change, chills, fatigue and fever.   HENT:  Negative for congestion and sore throat.    Respiratory:  Positive for shortness of breath. Negative for cough, choking, chest tightness and wheezing.    Cardiovascular:  Negative for chest pain and palpitations.   Gastrointestinal:  Positive for abdominal pain and nausea. Negative for constipation, diarrhea and vomiting.   Genitourinary:  Negative for dysuria and flank pain.   Musculoskeletal:  Positive for back pain. Negative for joint swelling.   Neurological:  Negative for dizziness and headaches.   Psychiatric/Behavioral:  Negative for confusion. The patient is not nervous/anxious.      Objective:     Vital Signs (Most Recent):  Temp: 98.3 °F (36.8 °C) (11/05/23 0815)  Pulse: 101 (11/05/23 0815)  Resp: 14 (11/05/23 0815)  BP: 121/78 (11/05/23 0815)  SpO2: 95 % (11/05/23 0815) Vital Signs (24h Range):  Temp:  [98 °F (36.7 °C)-99.5 °F (37.5 °C)] 98.3 °F (36.8 °C)  Pulse:  [] 101  Resp:  [8-21] 14  SpO2:  [93 %-97 %] 95 %  BP: (112-129)/(75-87) 121/78     Weight: 68 kg (150 lb)  Body mass index is 21.52 kg/m².    Intake/Output Summary (Last 24 hours) at 11/5/2023 1056  Last data filed at 11/5/2023 0823  Gross per 24 hour   Intake 800 ml   Output 2475 ml   Net -1675 ml         Physical Exam  Vitals and nursing note reviewed.   HENT:      Head: Normocephalic.      Nose: Nose normal.      Mouth/Throat:      Mouth: Mucous membranes are moist.   Eyes:      Pupils: Pupils are equal, round, and reactive to light.   Cardiovascular:      Rate and Rhythm: Normal rate.   Pulmonary:      Effort: Pulmonary effort is normal.      Breath sounds: No rales.       Comments: Decreased breath sounds of right lower lung zones. No rales or wheezing.   Abdominal:      General: Abdomen is flat. There is no distension.      Palpations: There is no mass.      Tenderness: There is abdominal tenderness. There is guarding (voluntary). There is no right CVA tenderness, left CVA tenderness or rebound.   Musculoskeletal:         General: Normal range of motion.      Cervical back: Normal range of motion.      Right lower leg: No edema.      Left lower leg: No edema.   Skin:     General: Skin is warm.      Capillary Refill: Capillary refill takes less than 2 seconds.      Findings: No lesion.   Neurological:      General: No focal deficit present.      Mental Status: He is alert and oriented to person, place, and time.   Psychiatric:         Mood and Affect: Mood normal.             Significant Labs: All pertinent labs within the past 24 hours have been reviewed.    Significant Imaging: I have reviewed all pertinent imaging results/findings within the past 24 hours.

## 2023-11-05 NOTE — PLAN OF CARE
Pt A&Ox4, VSS. PCA dilaudid running for pain control. Pain steady 8 out of 10 throughout night, at times up to 9 out of 10.     Problem: Adult Inpatient Plan of Care  Goal: Plan of Care Review  Outcome: Ongoing, Progressing  Goal: Patient-Specific Goal (Individualized)  Outcome: Ongoing, Progressing  Goal: Absence of Hospital-Acquired Illness or Injury  Outcome: Ongoing, Progressing  Goal: Optimal Comfort and Wellbeing  Outcome: Ongoing, Progressing  Goal: Readiness for Transition of Care  Outcome: Ongoing, Progressing     Problem: Nausea and Vomiting  Goal: Fluid and Electrolyte Balance  Outcome: Ongoing, Progressing     Problem: Fluid Imbalance (Pancreatitis)  Goal: Fluid Balance  Outcome: Ongoing, Progressing     Problem: Infection (Pancreatitis)  Goal: Infection Symptom Resolution  Outcome: Ongoing, Progressing     Problem: Nutrition Impaired (Pancreatitis)  Goal: Optimal Nutrition Intake  Outcome: Ongoing, Progressing     Problem: Pain (Pancreatitis)  Goal: Acceptable Pain Control  Outcome: Ongoing, Progressing     Problem: Respiratory Compromise (Pancreatitis)  Goal: Effective Oxygenation and Ventilation  Outcome: Ongoing, Progressing     Problem: Adjustment to Illness (Sepsis/Septic Shock)  Goal: Optimal Coping  Outcome: Ongoing, Progressing     Problem: Bleeding (Sepsis/Septic Shock)  Goal: Absence of Bleeding  Outcome: Ongoing, Progressing     Problem: Glycemic Control Impaired (Sepsis/Septic Shock)  Goal: Blood Glucose Level Within Desired Range  Outcome: Ongoing, Progressing     Problem: Infection Progression (Sepsis/Septic Shock)  Goal: Absence of Infection Signs and Symptoms  Outcome: Ongoing, Progressing     Problem: Nutrition Impaired (Sepsis/Septic Shock)  Goal: Optimal Nutrition Intake  Outcome: Ongoing, Progressing     Problem: Fall Injury Risk  Goal: Absence of Fall and Fall-Related Injury  Outcome: Ongoing, Progressing

## 2023-11-05 NOTE — ASSESSMENT & PLAN NOTE
Patient found to have large pleural effusion on 11/3 CT scan, followed by CXR. Soon after, patient with new oxygen requirements and remains on 2-3L NC. Differentials for etiology include third spacing 2/2 low oncotic pressure, pancreaticopleural fistula following aspiration, among others. Slight improvement on repeat CXR (11/4 AM) after diuresis on 11/3. Urine output ~3L, though repeat CXR (11/5) without significant improvement.      - Lasix 20 mg again today  - Consult pulmonology to evaluate utility of thoracentesis after maintaining higher O2 requirements despite diuresis  - Monitor urine output

## 2023-11-05 NOTE — HPI
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 Patient was recently admitted for recurrent symptoms of acute necrotizing pancreatitis.   Patient discloses no fever, chills, chest pain but has shortness of breath that he attributed to his abdominal pain.  On presentation, patient was afebrile and hemodynamically stable. CT abdomen and pelvis showed necrotizing pancreatitis with redemonstration of the multilocular fluid collection in the upper abdomen as well as including the pancreatic body and tail, large volume right pleural effusion with associated right lower lobe atelectasis, left lower lobe small volume pleural effusion with associated atelectasis and suspected thrombus of the splenic vein with collateralization,  Patient was admitted to the hospital medicine service. Pulmonary consulted for new onset right sided effusion in patient with pancreatitis.

## 2023-11-05 NOTE — PLAN OF CARE
11/04/23 0725   Discharge Reassessment   Assessment Type Discharge Planning Reassessment   Did the patient's condition or plan change since previous assessment? No   Discharge Plan discussed with: Patient;Parent(s)   Name(s) and Number(s) Denice Cardona (Mother)   [phone] (Mobile   Communicated CASTILLO with patient/caregiver Yes   Discharge Plan A Home with family   Discharge Plan B Home Health   DME Needed Upon Discharge  other (see comments)  (TBD)   Transition of Care Barriers Underinsured;Does not adhere to care plan   Why the patient remains in the hospital Requires continued medical care   Post-Acute Status   Post-Acute Authorization Other   Coverage MEDICAID - [facility] ([place] MEDICAID)   Other Status No Post-Acute Service Needs   Hospital Resources/Appts/Education Provided Provided education on problems/symptoms using teachback   Discharge Delays (!) Procedure Scheduling (IR, OR, Labs, Echo, Cath, Echo, EEG)     CM met with patient/family to discuss any changes in discharge planning.  Patients plan is to  DC home with family. Barrier: pain and nausea control.   No changes in DC plans. CASTILLO:  11/6/23    Discharge Recommendations:    Mayela Mckeon RN  Case Management  [facility]  [phone]

## 2023-11-05 NOTE — ASSESSMENT & PLAN NOTE
Admit with hemoglobin 10.2; Baseline ~ 10    Lab Results   Component Value Date    HGB 8.6 (L) 11/05/2023     - Daily CBC   - Transfuse hgb <7

## 2023-11-06 LAB
ALBUMIN SERPL BCP-MCNC: 2.8 G/DL (ref 3.5–5.2)
ALP SERPL-CCNC: 66 U/L (ref 55–135)
ALT SERPL W/O P-5'-P-CCNC: 8 U/L (ref 10–44)
AMYLASE, PANCREATIC FLUID: NORMAL U/L
ANION GAP SERPL CALC-SCNC: 11 MMOL/L (ref 8–16)
AST SERPL-CCNC: 15 U/L (ref 10–40)
BACTERIA SPEC AEROBE CULT: NO GROWTH
BASOPHILS # BLD AUTO: 0.02 K/UL (ref 0–0.2)
BASOPHILS NFR BLD: 0.3 % (ref 0–1.9)
BDY SITE: NORMAL
BDY SITE: NORMAL
BILIRUB SERPL-MCNC: 0.3 MG/DL (ref 0.1–1)
BUN SERPL-MCNC: 7 MG/DL (ref 6–20)
CALCIUM SERPL-MCNC: 9.1 MG/DL (ref 8.7–10.5)
CEA FLD-MCNC: 2.8 NG/ML
CHLORIDE SERPL-SCNC: 97 MMOL/L (ref 95–110)
CO2 SERPL-SCNC: 27 MMOL/L (ref 23–29)
CREAT SERPL-MCNC: 0.6 MG/DL (ref 0.5–1.4)
DIFFERENTIAL METHOD: ABNORMAL
EOSINOPHIL # BLD AUTO: 0.4 K/UL (ref 0–0.5)
EOSINOPHIL NFR BLD: 6.7 % (ref 0–8)
ERYTHROCYTE [DISTWIDTH] IN BLOOD BY AUTOMATED COUNT: 18.9 % (ref 11.5–14.5)
EST. GFR  (NO RACE VARIABLE): >60 ML/MIN/1.73 M^2
FINAL PATHOLOGIC DIAGNOSIS: NORMAL
GLUCOSE SERPL-MCNC: 104 MG/DL (ref 70–110)
HCT VFR BLD AUTO: 26 % (ref 40–54)
HGB BLD-MCNC: 8 G/DL (ref 14–18)
IMM GRANULOCYTES # BLD AUTO: 0.01 K/UL (ref 0–0.04)
IMM GRANULOCYTES NFR BLD AUTO: 0.2 % (ref 0–0.5)
LYMPHOCYTES # BLD AUTO: 2 K/UL (ref 1–4.8)
LYMPHOCYTES NFR BLD: 30 % (ref 18–48)
Lab: NORMAL
MAGNESIUM SERPL-MCNC: 1.8 MG/DL (ref 1.6–2.6)
MCH RBC QN AUTO: 26.8 PG (ref 27–31)
MCHC RBC AUTO-ENTMCNC: 30.8 G/DL (ref 32–36)
MCV RBC AUTO: 87 FL (ref 82–98)
MONOCYTES # BLD AUTO: 0.5 K/UL (ref 0.3–1)
MONOCYTES NFR BLD: 8.1 % (ref 4–15)
NEUTROPHILS # BLD AUTO: 3.6 K/UL (ref 1.8–7.7)
NEUTROPHILS NFR BLD: 54.7 % (ref 38–73)
NRBC BLD-RTO: 0 /100 WBC
PHOSPHATE SERPL-MCNC: 4.2 MG/DL (ref 2.7–4.5)
PLATELET # BLD AUTO: 322 K/UL (ref 150–450)
PMV BLD AUTO: 10.7 FL (ref 9.2–12.9)
POTASSIUM SERPL-SCNC: 3.6 MMOL/L (ref 3.5–5.1)
PROT SERPL-MCNC: 6.8 G/DL (ref 6–8.4)
RBC # BLD AUTO: 2.99 M/UL (ref 4.6–6.2)
SODIUM SERPL-SCNC: 135 MMOL/L (ref 136–145)
WBC # BLD AUTO: 6.54 K/UL (ref 3.9–12.7)

## 2023-11-06 PROCEDURE — 63600175 PHARM REV CODE 636 W HCPCS: Performed by: STUDENT IN AN ORGANIZED HEALTH CARE EDUCATION/TRAINING PROGRAM

## 2023-11-06 PROCEDURE — 36415 COLL VENOUS BLD VENIPUNCTURE: CPT

## 2023-11-06 PROCEDURE — 80053 COMPREHEN METABOLIC PANEL: CPT

## 2023-11-06 PROCEDURE — 25000003 PHARM REV CODE 250

## 2023-11-06 PROCEDURE — 94761 N-INVAS EAR/PLS OXIMETRY MLT: CPT

## 2023-11-06 PROCEDURE — 63600175 PHARM REV CODE 636 W HCPCS

## 2023-11-06 PROCEDURE — 25000003 PHARM REV CODE 250: Performed by: INTERNAL MEDICINE

## 2023-11-06 PROCEDURE — 83735 ASSAY OF MAGNESIUM: CPT

## 2023-11-06 PROCEDURE — 84100 ASSAY OF PHOSPHORUS: CPT

## 2023-11-06 PROCEDURE — 20600001 HC STEP DOWN PRIVATE ROOM

## 2023-11-06 PROCEDURE — 99233 SBSQ HOSP IP/OBS HIGH 50: CPT | Mod: ,,, | Performed by: HOSPITALIST

## 2023-11-06 PROCEDURE — 94640 AIRWAY INHALATION TREATMENT: CPT

## 2023-11-06 PROCEDURE — 85025 COMPLETE CBC W/AUTO DIFF WBC: CPT

## 2023-11-06 PROCEDURE — 25000242 PHARM REV CODE 250 ALT 637 W/ HCPCS: Performed by: STUDENT IN AN ORGANIZED HEALTH CARE EDUCATION/TRAINING PROGRAM

## 2023-11-06 PROCEDURE — 27000221 HC OXYGEN, UP TO 24 HOURS

## 2023-11-06 PROCEDURE — 99233 PR SUBSEQUENT HOSPITAL CARE,LEVL III: ICD-10-PCS | Mod: ,,, | Performed by: HOSPITALIST

## 2023-11-06 PROCEDURE — 99900035 HC TECH TIME PER 15 MIN (STAT)

## 2023-11-06 PROCEDURE — 25000003 PHARM REV CODE 250: Performed by: STUDENT IN AN ORGANIZED HEALTH CARE EDUCATION/TRAINING PROGRAM

## 2023-11-06 PROCEDURE — C1751 CATH, INF, PER/CENT/MIDLINE: HCPCS

## 2023-11-06 PROCEDURE — 36410 VNPNXR 3YR/> PHY/QHP DX/THER: CPT

## 2023-11-06 PROCEDURE — 76937 US GUIDE VASCULAR ACCESS: CPT

## 2023-11-06 RX ORDER — PROCHLORPERAZINE EDISYLATE 5 MG/ML
5 INJECTION INTRAMUSCULAR; INTRAVENOUS EVERY 6 HOURS PRN
Status: DISCONTINUED | OUTPATIENT
Start: 2023-11-06 | End: 2023-11-10 | Stop reason: HOSPADM

## 2023-11-06 RX ORDER — OXYMETAZOLINE HCL 0.05 %
2 SPRAY, NON-AEROSOL (ML) NASAL
Status: DISCONTINUED | OUTPATIENT
Start: 2023-11-06 | End: 2023-11-09

## 2023-11-06 RX ORDER — OXYMETAZOLINE HCL 0.05 %
2 SPRAY, NON-AEROSOL (ML) NASAL 2 TIMES DAILY
Status: DISCONTINUED | OUTPATIENT
Start: 2023-11-06 | End: 2023-11-06

## 2023-11-06 RX ORDER — FUROSEMIDE 10 MG/ML
60 INJECTION INTRAMUSCULAR; INTRAVENOUS
Status: COMPLETED | OUTPATIENT
Start: 2023-11-06 | End: 2023-11-06

## 2023-11-06 RX ORDER — OXYMETAZOLINE HCL 0.05 %
2 SPRAY, NON-AEROSOL (ML) NASAL
Status: DISCONTINUED | OUTPATIENT
Start: 2023-11-06 | End: 2023-11-06

## 2023-11-06 RX ADMIN — MEROPENEM 1 G: 1 INJECTION INTRAVENOUS at 05:11

## 2023-11-06 RX ADMIN — ONDANSETRON 4 MG: 2 INJECTION INTRAMUSCULAR; INTRAVENOUS at 11:11

## 2023-11-06 RX ADMIN — GABAPENTIN 600 MG: 300 CAPSULE ORAL at 09:11

## 2023-11-06 RX ADMIN — Medication: at 05:11

## 2023-11-06 RX ADMIN — PANCRELIPASE 2 CAPSULE: 30000; 6000; 19000 CAPSULE, DELAYED RELEASE PELLETS ORAL at 04:11

## 2023-11-06 RX ADMIN — IPRATROPIUM BROMIDE AND ALBUTEROL SULFATE 3 ML: .5; 3 SOLUTION RESPIRATORY (INHALATION) at 08:11

## 2023-11-06 RX ADMIN — FUROSEMIDE 60 MG: 10 INJECTION, SOLUTION INTRAMUSCULAR; INTRAVENOUS at 09:11

## 2023-11-06 RX ADMIN — Medication: at 06:11

## 2023-11-06 RX ADMIN — ACETAMINOPHEN 650 MG: 325 TABLET ORAL at 04:11

## 2023-11-06 RX ADMIN — IPRATROPIUM BROMIDE AND ALBUTEROL SULFATE 3 ML: .5; 3 SOLUTION RESPIRATORY (INHALATION) at 03:11

## 2023-11-06 RX ADMIN — MEROPENEM 1 G: 1 INJECTION INTRAVENOUS at 07:11

## 2023-11-06 RX ADMIN — PANCRELIPASE 2 CAPSULE: 30000; 6000; 19000 CAPSULE, DELAYED RELEASE PELLETS ORAL at 08:11

## 2023-11-06 RX ADMIN — HYDROXYZINE PAMOATE 25 MG: 25 CAPSULE ORAL at 09:11

## 2023-11-06 RX ADMIN — PROCHLORPERAZINE EDISYLATE 5 MG: 5 INJECTION INTRAMUSCULAR; INTRAVENOUS at 03:11

## 2023-11-06 RX ADMIN — FOLIC ACID 1 MG: 1 TABLET ORAL at 08:11

## 2023-11-06 RX ADMIN — SENNOSIDES AND DOCUSATE SODIUM 1 TABLET: 8.6; 5 TABLET ORAL at 09:11

## 2023-11-06 RX ADMIN — Medication 6 MG: at 09:11

## 2023-11-06 RX ADMIN — SENNOSIDES AND DOCUSATE SODIUM 1 TABLET: 8.6; 5 TABLET ORAL at 08:11

## 2023-11-06 RX ADMIN — PANCRELIPASE 2 CAPSULE: 30000; 6000; 19000 CAPSULE, DELAYED RELEASE PELLETS ORAL at 01:11

## 2023-11-06 RX ADMIN — APIXABAN 5 MG: 5 TABLET, FILM COATED ORAL at 08:11

## 2023-11-06 RX ADMIN — GABAPENTIN 600 MG: 300 CAPSULE ORAL at 03:11

## 2023-11-06 RX ADMIN — BICTEGRAVIR SODIUM, EMTRICITABINE, AND TENOFOVIR ALAFENAMIDE FUMARATE 1 TABLET: 50; 200; 25 TABLET ORAL at 08:11

## 2023-11-06 RX ADMIN — PANTOPRAZOLE SODIUM 40 MG: 40 TABLET, DELAYED RELEASE ORAL at 09:11

## 2023-11-06 RX ADMIN — APIXABAN 5 MG: 5 TABLET, FILM COATED ORAL at 09:11

## 2023-11-06 RX ADMIN — FLUCONAZOLE 400 MG: 200 TABLET ORAL at 08:11

## 2023-11-06 RX ADMIN — MEROPENEM 1 G: 1 INJECTION INTRAVENOUS at 02:11

## 2023-11-06 RX ADMIN — POTASSIUM BICARBONATE 40 MEQ: 391 TABLET, EFFERVESCENT ORAL at 11:11

## 2023-11-06 RX ADMIN — IPRATROPIUM BROMIDE AND ALBUTEROL SULFATE 3 ML: .5; 3 SOLUTION RESPIRATORY (INHALATION) at 09:11

## 2023-11-06 RX ADMIN — GABAPENTIN 600 MG: 300 CAPSULE ORAL at 08:11

## 2023-11-06 RX ADMIN — PANTOPRAZOLE SODIUM 40 MG: 40 TABLET, DELAYED RELEASE ORAL at 08:11

## 2023-11-06 NOTE — PLAN OF CARE
Pt dx chronic pancreatitis. AAOx4. CM in use, alarms set and on. Anxious. SR/ST on CM. Dilaudid PCA in progress to right upper arm midline. Pallor. Discussed current condition and plan of care, voiced understanding, questions answered. Meds reviewed. [place]bell within reach.

## 2023-11-06 NOTE — PROGRESS NOTES
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 He has been in and out of the hospital for the last two weeks. His most recent discharge was on10/19 after admission for recurrent symptoms of his acute necrotizing pancreatitis. He was discharged home on MSIR and Morphine which was working well until yesterday morning (10/21) when he started having increased abdominal pain w/nausea. He attempted to eat something but vomited. He denies any recent alcohol or tobacco use. He reports eating normally since discharge. Last meal was a grilled cheese yesterday for dinner. Last BM was loose and "oily" but normal color w/out blood present. He was evaluated by AES on his 10/5 admission who recommended against any intervention on the pancreatic collection due to immaturity. He has been on IV Cefepime since 10/5 with possible end date 11/2.     In the ED, he was afebrile and tachycardic to the 130s. Labs were notable for K 3.3, Bicarb 21, BUN 5, Alb 3.0, Lipase 96. Lactate WNL. Repeat CT A/P showed small R pleural effusion but no acute intra-abdominopelvic abnormality. Peripancreatic fluid collection at the pancreatic tail stable from previous imaging. He was given Dilaudid 2mg x1, Dilaudid 1mg x2 for pain and his evening dose of Cefepime. Hospital Medicine was called for admission of     Overview/Hospital Course:  Admitted to hospital medicine. Cefepime and MS contin continued, with addition of " "scheduled tylenol + toradol and then breakthrough dilaudid. AES consulted again now that imaging findings were stable >4w since initial diagnosis and persistent symptoms. Underwent EUS with findings of stable fluid collection which was not drained ("Complex collections along the neck, body, and tail of pancreas (body/tail region likely connected). Not suitable for endoscopic transmural stenting/drainage.") He will need follow-up CT scan in 4-6 weeks. Cefepime to complete 11/2/23. Patient with increasing pain and inadequate pain control despite dilaudid 2mg q4h w/ MS Contin 15mg bid and multimodal pain medication. A repeat CT Abd/Pelvis on 10/29 showed similar prior fluid collection with new fluid along the posterior aspect of the liver causing IVC compression. Patients case discussed with both IR and AES, both of whom do not feel he is a good candidate for drainage, either percutaneously/transhepatic/transpleural or endoscopically. ID consulted and antibiotics broadened to Meropenem + Diflucan on 10/30 after fevers on evening of 10/29. MS Contin increased to 30 mg BID. He underwent IR aspiration of his older collection on 11/2 w/ 7cc fluid aspirated. Fluid studies negative for infectious process at this time Repeat imaging with stable vs. Interval decrease in abdominal collections, though notable large R-sided pleural effusion. Course complicated by acute respiratory failure with effusion, possibly 2/2 SIRS/third spacing 2/2 hypoalbuminemia. Trial of diuresis yielding 3L urine output, though O2 requirements remain the same. Pulmonology consulted for pleural efffusion, patient refusing thoracentesis. PCA pump initiated after patient requiring 2mg dilaudid every 2H.       Interval History: NAEON. Yesterday with 2.5L UOP, net positive this admission. Patient refusing thoracentesis due pain concerns. On 3L NC.    Review of Systems   Constitutional:  Negative for appetite change, chills, fatigue and fever.   HENT:  " Negative for congestion and sore throat.    Respiratory:  Positive for shortness of breath. Negative for cough, choking, chest tightness and wheezing.    Cardiovascular:  Negative for chest pain and palpitations.   Gastrointestinal:  Positive for abdominal pain and nausea. Negative for constipation, diarrhea and vomiting.   Genitourinary:  Negative for dysuria and flank pain.   Musculoskeletal:  Positive for back pain. Negative for joint swelling.   Neurological:  Negative for dizziness and headaches.   Psychiatric/Behavioral:  Negative for confusion. The patient is not nervous/anxious.      Objective:     Vital Signs (Most Recent):  Temp: 98.5 °F (36.9 °C) (11/06/23 0355)  Pulse: 110 (11/06/23 0355)  Resp: 14 (11/06/23 0619)  BP: 116/76 (11/06/23 0355)  SpO2: 95 % (11/06/23 0355) Vital Signs (24h Range):  Temp:  [98.5 °F (36.9 °C)-99.2 °F (37.3 °C)] 98.5 °F (36.9 °C)  Pulse:  [] 110  Resp:  [9-27] 14  SpO2:  [93 %-97 %] 95 %  BP: (116-125)/(75-78) 116/76     Weight: 68 kg (150 lb)  Body mass index is 21.52 kg/m².    Intake/Output Summary (Last 24 hours) at 11/6/2023 0825  Last data filed at 11/6/2023 0619  Gross per 24 hour   Intake 1320 ml   Output 3700 ml   Net -2380 ml         Physical Exam  Vitals and nursing note reviewed.   Constitutional:       General: He is not in acute distress.     Appearance: Normal appearance. He is not ill-appearing.   HENT:      Head: Normocephalic.      Nose: Nose normal.      Mouth/Throat:      Mouth: Mucous membranes are moist.      Pharynx: Oropharynx is clear.   Eyes:      General:         Right eye: No discharge.         Left eye: No discharge.      Extraocular Movements: Extraocular movements intact.      Conjunctiva/sclera: Conjunctivae normal.   Cardiovascular:      Rate and Rhythm: Regular rhythm. Tachycardia present.      Heart sounds: No murmur heard.  Pulmonary:      Effort: Pulmonary effort is normal. No respiratory distress.      Breath sounds: No wheezing,  rhonchi or rales.      Comments: Decreased breath sounds of right lower lung zones.  Abdominal:      General: Abdomen is flat. There is no distension.      Palpations: There is no mass.      Tenderness: There is abdominal tenderness. There is guarding (voluntary). There is no right CVA tenderness, left CVA tenderness or rebound.   Musculoskeletal:         General: Normal range of motion.      Cervical back: Normal range of motion.      Right lower leg: No edema.      Left lower leg: No edema.   Skin:     General: Skin is warm and dry.      Capillary Refill: Capillary refill takes less than 2 seconds.      Findings: No lesion.   Neurological:      General: No focal deficit present.      Mental Status: He is alert and oriented to person, place, and time.   Psychiatric:         Mood and Affect: Mood normal.         Thought Content: Thought content normal.             Significant Labs: All pertinent labs within the past 24 hours have been reviewed.    Significant Imaging: I have reviewed all pertinent imaging results/findings within the past 24 hours.    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 Lipase mildly elevated. WBC and lactate WNL. On IV Cefepime for 4wk course. Recent discharge on 10/19 with MSIR and Morphine for pain control. EUS (10/23/23) complex collections along the neck, body, and tail of pancreas; though not suitable for endoscopic transmural stenting/drainage. Repeat CT (10/29) due to worsening abdominal pain despite high opioid doses, showing new fluid collection pressing on IVC. Discussed with AES, who will not be able to drain new collection endoscopically, and IR, who does not have a good sampling window and is reluctant to use transhepatic/transpleural  methods. General surgery consulted though no plans for acute intervention. Abx broadened from cefepime to meropenem + diflucan after fevering on 10/29; ID consulted. IR reconsulted for drainage of anterior collection; s/p IR aspiration w/ 7 cc output on 11/2. Repeat abdominal CT (11/3) with interval decrease of collection sizes, though now with surrounding inflammatory changes, likely 2/2 recent aspiration procedure and new large right sided effusion. Fluid analysis from pancreatic aspirate remains without evidence of infection at this time.     - Continue to follow cultures, amylase, other labs from pancreatic aspirate  - continue creon  - Meropenem and Diflucan started (10/30 - 11/13) per ID recs  - Midline team re-consulted for replacement, current one nearing 4 weeks   - pain control -  gabapentin 600 mg TID; discontinued MS contin and PRN dilaudid and started on PCA pump on 11/4. Patient still without adequate pain control after basal rate of 0.5 mg/hr and bolus dose of 0.2 mg with 15 minute lockout.      Acute hypoxemic respiratory failure  Patient found to have large pleural effusion on 11/3 CT scan, followed by CXR. Soon after, patient with new oxygen requirements and remains on 2-3L NC. Differentials for etiology include third spacing 2/2 low oncotic pressure, pancreaticopleural fistula following aspiration, among others. Slight improvement on repeat CXR (11/4 AM) after diuresis on 11/3. Urine output ~3L, though repeat CXR without significant improvement.      - Pulmonology evaluated patient for thoracentesis; patient refusing at this time. Should patient agree to thoracentesis, will need eliquis washout.  - Monitor urine output  - Lasix 20 mg today    Pleural effusion  See plan for 'Acute Hypoxemic Respiratory Failure'     Moderate malnutrition  Nutrition consulted. Most recent weight and BMI monitored-     Measurements:  Wt Readings from Last 1 Encounters:   10/26/23 68 kg (150 lb)   Body mass index is  21.52 kg/m².    Patient has been screened and assessed by RD.    Malnutrition Type:  Context: chronic illness  Level:      Malnutrition Characteristic Summary:  Weight Loss (Malnutrition): 5% in 1 month  Energy Intake (Malnutrition): less than 75% for greater than or equal to 1 month    Interventions/Recommendations (treatment strategy):  1. When medically able, ADAT Regular diet  2. Add Boost Breeze TID. Modify to Boost Plus (vanilla) when diet advanced.  3. RD to monitor and follow      Necrotizing pancreatitis  Plan as above      Sepsis  This patient does have evidence of infective focus  My overall impression is sepsis.  Source: Abdominal  Antibiotics given-   Antibiotics (72h ago, onward)      Start     Stop Route Frequency Ordered    11/01/23 1000  meropenem (MERREM) 1 g in sodium chloride 0.9 % 100 mL IVPB (MB+)         11/13/23 0159 IV Every 8 hours (non-standard times) 11/01/23 0855          Latest lactate reviewed-  Recent Labs   Lab 11/04/23  0636   LACTATE 0.6         Fluid challenge Not needed - patient is not hypotensive      Post- resuscitation assessment No - Post resuscitation assessment not needed       Will Not start Pressors- Levophed for MAP of 65  Source control achieved by: Meropenem, Diflucan    Therapeutic opioid-induced constipation (OIC)  Patient with extended course of high-potency opioids since being hospitalized multiple times for pancreatitis, starting on 9/17/23. KUB (10/27/23) notable for high-stool burden, though without focal dilatation, transition points, or free air. Patient with increased abdominal pain that is different in character and location from description of pain on admission, now including dull, achey pain in belt like distribution with intermittent nausea. Interval CT abdomen (10/30) with decreased stool burden following lactulose multiple times daily.     - Continue bowel regimen of pericolace BID and miralax BID   - can add lactulose/enemas if needed    Pancreatic  "pseudocyst/cyst  Plan as above    Hypokalemia  K 3.3 on admission; likely 2/2 to decreased PO intake    - Gave IV K 10meq Q1H for 6 doses   - Replete K PRN       Continuous severe abdominal pain  Likely 2/2 to pancreatitis; Discharged home on Morphine taper which was working well until yesterday morning    - See Chronic Pancreatitis       Alcohol use disorder  Hx of EtOH use disorder. On most recent admission, patient's ethanol negative in setting of pancreatitis flare.   - monitor for signs of withdrawal, though patient denies EtOH use in between recent admissions      Fluid collection of pancreas  Plan as above        Splenic vein thrombosis  Splenic vein thrombus originally seen on 9/20 imaging, and persistent on most recent CT abdominal imaging. Started on DOAC at last admission, loading dose completed.     - Continue eliquis 5 mg BID        Mild intermittent asthma without complication  History of asthma requiring PRN albuterol. Has not recently been using any inhaler treatment.     Normocytic anemia  Admit with hemoglobin 10.2; Baseline ~ 10    Lab Results   Component Value Date    HGB 8.6 (L) 11/05/2023     - Daily CBC   - Transfuse hgb <7       Polycythemia vera  Hgb 10.5 on admission. Follows up with hematology as outpatient.   - continue to monitor daily CBC      HIV infection  This patient in known to have HIV+ status (Have detected HIV PCR but never CD4 <200 or AIDS defining illness). Labs reviewed- No results found for: "CD4", No results found for: "HIVDNAPCR". Patient is on HAART. Will continue HAART. Continue to monitor routine labs. Last CD4 count was   Lab Results   Component Value Date    ABSOLUTECD4 1080 10/12/2023     - Continue home Biktarvy         VTE Risk Mitigation (From admission, onward)           Ordered     apixaban tablet 5 mg  2 times daily         10/24/23 0744                    Discharge Planning   CASTILLO: 11/7/2023     Code Status: Full Code   Is the patient medically ready for " discharge?: No    Reason for patient still in hospital (select all that apply): Treatment  Discharge Plan A: Home with family   Discharge Delays: (!) Procedure Scheduling (IR, OR, Labs, Echo, Cath, Echo, EEG)        Krystal Adam MD  Department of Hospital Medicine   [facility] - Intensive Care ([place]

## 2023-11-06 NOTE — RESPIRATORY THERAPY
"RAPID RESPONSE RESPIRATORY THERAPY ETCO2 CHECK         Time of visit: 075      Code Status: Full Code   : 2000  Bed: 72517/47907 A:   MRN: [id number]  Time spent at the bedside: < 15 min    SITUATION    Evaluated patient for: ETCo2 compliance    BACKGROUND    Why is the patient in the hospital?: Chronic pancreatitis    Patient has a past medical history of Acute necrotizing pancreatitis, Alcohol use disorder, Asthma, Hepatitis C antibody positive in blood, HIV infection, Corinne-Chauhan tear, Normocytic anemia, Pancreatic pseudocyst/cyst, Polycythemia vera, Positive CMV IgG serology, and Splenic vein thrombosis.    24 Hours Vitals Range:  Temp:  [98.5 °F (36.9 °C)-99.2 °F (37.3 °C)]   Pulse:  []   Resp:  [10-38]   BP: (116-131)/(75-82)   SpO2:  [87 %-99 %]     Labs:    Recent Labs     23  0636 23  0522 23  0254    137 135*   K 3.4* 3.7 3.6    99 97   CO2 23 29 27   BUN 7 5* 7   CREATININE 0.6 0.6 0.6    101 104   PHOS 4.2 4.3 4.2   MG 1.9 1.9 1.8        No results for input(s): "PH", "PCO2", "PO2", "HCO3", "POCSATURATED", "BE" in the last 72 hours.    ASSESSMENT/INTERVENTIONS      Last VS   Temp: 98.6 °F (37 °C) (930)  Pulse: 132 ( 1101)  Resp: 20 ( 103)  BP: 131/82 (930)  SpO2: 92 % ( 103)    Level of Consciousness: Level of Consciousness (AVPU): alert  Respiratory Effort: Respiratory Effort: Normal, Unlabored Expansion/Accessory Muscle Usage: Expansion/Accessory Muscles/Retractions: expansion symmetric, no retractions, no use of accessory muscles  All Lung Field Breath Sounds: All Lung Fields Breath Sounds: clear, equal bilaterally, unable to assess  SUSAN Breath Sounds: clear  LLL Breath Sounds: diminished  RUL Breath Sounds: clear  RLL Breath Sounds: diminished  Is the ETCO2 monitor on? Yes  Is the patient wearing a cannula? Yes  Are ETCO2 orders placed? Yes  Is the patient on a PCA pump? Yes  ETCO2 monitored: ETCO2 (mmHg): 36 mmHg  Ambu " at bedside:      Active Orders   Respiratory Care    END TIDAL CO2 MONITOR Q12H     Frequency: Q12H     Number of Occurrences: Until Specified    Incentive spirometry     Frequency: Q4H     Number of Occurrences: Until Specified    Inhalation Treatment Q6H WAKE     Frequency: Q6H WAKE     Number of Occurrences: Until Specified    Oxygen Continuous     Frequency: Continuous     Number of Occurrences: Until Specified     Order Questions:      Device type: Low flow      Device: Nasal Cannula-Humidified      Titrate O2 per Oxygen Titration Protocol: Yes      To maintain SpO2 goal of: >= 90%      Notify MD of: Inability to achieve desired SpO2; Sudden change in patient status and requires 20% increase in FiO2; Patient requires >60% FiO2    Oxygen PRN     Frequency: PRN     Number of Occurrences: Until Specified     Order Questions:      Device type: Low flow      Device: Nasal Cannula (1- 5 Liters)      LPM: 2      Titrate O2 per Oxygen Titration Protocol: Yes      To maintain SpO2 goal of: >= 92%      Notify MD of: Inability to achieve desired SpO2; Sudden change in patient status and requires 20% increase in FiO2; Patient requires >60% FiO2    Pulse Oximetry Q Shift     Frequency: Q Shift     Number of Occurrences: Until Specified    Pulse Oximetry Q4H     Frequency: Q4H     Number of Occurrences: Until Specified       RECOMMENDATIONS    We recommend: RRT Recs: Continue POC per primary team.      FOLLOW-UP    Please call back the Rapid Response RT[facility]ao RRT at x [phone] for any questions or concerns.                            not applicable (Male)

## 2023-11-06 NOTE — PLAN OF CARE
Notified from tele monitor staff the pt -140's, immediately in pt room, pt standing with nose bleed, minimal bleeding noted. Pt back to bed, CM lead pads not appropriately placed, replace lead pads. 's ST on CM. . C/o SOB, O2@ 3LNC, pox 89% , encouraged cough and deep breathing, O2 increased to 4LNC  Pox 93%. Dr Shah notified, Judie HANSEN in room. New orders noted. Prn med given for anxiety. Bleeding to nose stopped. Pt had just received a breathing tx prior to nose bleed. Monitoring closely.

## 2023-11-06 NOTE — ASSESSMENT & PLAN NOTE
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 Lipase mildly elevated. WBC and lactate WNL. On IV Cefepime for 4wk course. Recent discharge on 10/19 with MSIR and Morphine for pain control. EUS (10/23/23) complex collections along the neck, body, and tail of pancreas; though not suitable for endoscopic transmural stenting/drainage. Repeat CT (10/29) due to worsening abdominal pain despite high opioid doses, showing new fluid collection pressing on IVC. Discussed with AES, who will not be able to drain new collection endoscopically, and IR, who does not have a good sampling window and is reluctant to use transhepatic/transpleural methods. General surgery consulted though no plans for acute intervention. Abx broadened from cefepime to meropenem + diflucan after fevering on 10/29; ID consulted. IR reconsulted for drainage of anterior collection; s/p IR aspiration w/ 7 cc output on 11/2. Repeat abdominal CT (11/3) with interval decrease of collection sizes, though now with surrounding inflammatory changes, likely 2/2 recent aspiration procedure and new large right sided effusion. Fluid analysis from pancreatic aspirate remains without evidence of infection at this time.     - Continue to follow cultures, amylase, other labs from pancreatic aspirate  - continue creon  - Meropenem and Diflucan started (10/30 - 11/13) per ID recs  - Midline team re-consulted for replacement, current one nearing 4 weeks   - pain control -  gabapentin 600 mg TID; discontinued MS contin and PRN dilaudid and started on PCA pump on 11/4. Patient still without adequate pain control after basal rate of 0.5 mg/hr and bolus dose of 0.2 mg with 15 minute lockout.

## 2023-11-06 NOTE — SUBJECTIVE & OBJECTIVE
Interval History: NAEON. Yesterday with 2.5L UOP, net positive this admission. Patient refusing thoracentesis due pain concerns. On 3L NC.    Review of Systems   Constitutional:  Negative for appetite change, chills, fatigue and fever.   HENT:  Negative for congestion and sore throat.    Respiratory:  Positive for shortness of breath. Negative for cough, choking, chest tightness and wheezing.    Cardiovascular:  Negative for chest pain and palpitations.   Gastrointestinal:  Positive for abdominal pain and nausea. Negative for constipation, diarrhea and vomiting.   Genitourinary:  Negative for dysuria and flank pain.   Musculoskeletal:  Positive for back pain. Negative for joint swelling.   Neurological:  Negative for dizziness and headaches.   Psychiatric/Behavioral:  Negative for confusion. The patient is not nervous/anxious.      Objective:     Vital Signs (Most Recent):  Temp: 98.5 °F (36.9 °C) (11/06/23 0355)  Pulse: 110 (11/06/23 0355)  Resp: 14 (11/06/23 0619)  BP: 116/76 (11/06/23 0355)  SpO2: 95 % (11/06/23 0355) Vital Signs (24h Range):  Temp:  [98.5 °F (36.9 °C)-99.2 °F (37.3 °C)] 98.5 °F (36.9 °C)  Pulse:  [] 110  Resp:  [9-27] 14  SpO2:  [93 %-97 %] 95 %  BP: (116-125)/(75-78) 116/76     Weight: 68 kg (150 lb)  Body mass index is 21.52 kg/m².    Intake/Output Summary (Last 24 hours) at 11/6/2023 0825  Last data filed at 11/6/2023 0619  Gross per 24 hour   Intake 1320 ml   Output 3700 ml   Net -2380 ml         Physical Exam  Vitals and nursing note reviewed.   Constitutional:       General: He is not in acute distress.     Appearance: Normal appearance. He is not ill-appearing.   HENT:      Head: Normocephalic.      Nose: Nose normal.      Mouth/Throat:      Mouth: Mucous membranes are moist.      Pharynx: Oropharynx is clear.   Eyes:      General:         Right eye: No discharge.         Left eye: No discharge.      Extraocular Movements: Extraocular movements intact.      Conjunctiva/sclera:  Sedation, oxygen delivery, and monitoring per anesthesia for endoscopic procedure.    Have you had a colonoscopy LESS THAN 3 years ago?   * If YES, answer these questions*:  No    1. Did patient have a prior colonic polyp in a previous surveillance/diagnostic colonoscopy and is 18 years or older on date of encounter?    2. Documentation of < 3 year interval since the patients last colonoscopy due to medical reasons (eg., last colonoscopy incomplete, last colonoscopy had inadequate prep, piecemeal removal of adenomas, or last colonoscopy found > 10 adenomas) ?    Conjunctivae normal.   Cardiovascular:      Rate and Rhythm: Regular rhythm. Tachycardia present.      Heart sounds: No murmur heard.  Pulmonary:      Effort: Pulmonary effort is normal. No respiratory distress.      Breath sounds: No wheezing, rhonchi or rales.      Comments: Decreased breath sounds of right lower lung zones.  Abdominal:      General: Abdomen is flat. There is no distension.      Palpations: There is no mass.      Tenderness: There is abdominal tenderness. There is guarding (voluntary). There is no right CVA tenderness, left CVA tenderness or rebound.   Musculoskeletal:         General: Normal range of motion.      Cervical back: Normal range of motion.      Right lower leg: No edema.      Left lower leg: No edema.   Skin:     General: Skin is warm and dry.      Capillary Refill: Capillary refill takes less than 2 seconds.      Findings: No lesion.   Neurological:      General: No focal deficit present.      Mental Status: He is alert and oriented to person, place, and time.   Psychiatric:         Mood and Affect: Mood normal.         Thought Content: Thought content normal.             Significant Labs: All pertinent labs within the past 24 hours have been reviewed.    Significant Imaging: I have reviewed all pertinent imaging results/findings within the past 24 hours.

## 2023-11-06 NOTE — ASSESSMENT & PLAN NOTE
Patient found to have large pleural effusion on 11/3 CT scan, followed by CXR. Soon after, patient with new oxygen requirements and remains on 2-3L NC. Differentials for etiology include third spacing 2/2 low oncotic pressure, pancreaticopleural fistula following aspiration, among others. Slight improvement on repeat CXR (11/4 AM) after diuresis on 11/3. Urine output ~3L, though repeat CXR without significant improvement.      - Pulmonology evaluated patient for thoracentesis; patient refusing at this time. Should patient agree to thoracentesis, will need eliquis washout.  - Monitor urine output  - Lasix 20 mg today

## 2023-11-06 NOTE — CONSULTS
ELKIN placed 4 Fr x 10 cm midline in Right brachial vein for IV abx using realtime ultrasound guidance.  Lot#[id number].  Max dwell date 12/5/23.  Imagery recorded and saved.

## 2023-11-06 NOTE — PT/OT/SLP PROGRESS
Occupational Therapy      Patient Name:  Tasia Cardona   MRN:  [id number]    Patient not seen today secondary to  increased HR on this date per nursing at rest and wants therapy held on this date.    11/6/2023

## 2023-11-06 NOTE — PLAN OF CARE
[facility] - Intensive Care ([facility]-)  Discharge Reassessment    Primary Care Provider: Pina Jones NP    Expected Discharge Date: 11/8/2023    Patient is not medically ready for discharge at this time.    Interval History: NAEON. Yesterday with 2.5L UOP, net positive this admission. Patient refusing thoracentesis due pain concerns. On 3L NC.       Reassessment (most recent)       Discharge Reassessment - 11/06/23 1611          Discharge Reassessment    Assessment Type Discharge Planning Reassessment     Did the patient's condition or plan change since previous assessment? No     Discharge Plan A Home with family     Discharge Plan B Home with family     DME Needed Upon Discharge  --   TBD    Transition of Care Barriers Underinsured;Does not adhere to care plan     Why the patient remains in the hospital Requires continued medical care        Post-Acute Status    Coverage MEDICAID - [facility] ([place] MEDICA[place])     Other Status No Post-Acute Service Needs

## 2023-11-06 NOTE — PROGRESS NOTES
"[facility] - [facility]ensive Care ([place] [address])  [facility] 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 He has been in and out of the hospital for the last two weeks. His most recent discharge was on10/19 after admission for recurrent symptoms of his acute necrotizing pancreatitis. He was discharged home on MSIR and Morphine which was working well until yesterday morning (10/21) when he started having increased abdominal pain w/nausea. He attempted to eat something but vomited. He denies any recent alcohol or tobacco use. He reports eating normally since discharge. Last meal was a grilled cheese yesterday for dinner. Last BM was loose and "oily" but normal color w/out blood present. He was evaluated by AES on his 10/5 admission who recommended against any intervention on the pancreatic collection due to immaturity. He has been on IV Cefepime since 10/5 with possible end date 11/2.     In the ED, he was afebrile and tachycardic to the 130s. Labs were notable for K 3.3, Bicarb 21, BUN 5, Alb 3.0, Lipase 96. Lactate WNL. Repeat CT A/P showed small R pleural effusion but no acute intra-abdominopelvic abnormality. Peripancreatic fluid collection at the pancreatic tail stable from previous imaging. He was given Dilaudid 2mg x1, Dilaudid 1mg x2 for pain and his evening dose of Cefepime. Hospital Medicine was called for admission of     Overview/Hospital Course:  Admitted to hospital medicine. Cefepime and MS contin continued, with addition of " "scheduled tylenol + toradol and then breakthrough dilaudid. AES consulted again now that imaging findings were stable >4w since initial diagnosis and persistent symptoms. Underwent EUS with findings of stable fluid collection which was not drained ("Complex collections along the neck, body, and tail of pancreas (body/tail region likely connected). Not suitable for endoscopic transmural stenting/drainage.") He will need follow-up CT scan in 4-6 weeks. Cefepime to complete 11/2/23. Patient with increasing pain and inadequate pain control despite dilaudid 2mg q4h w/ MS Contin 15mg bid and multimodal pain medication. A repeat CT Abd/Pelvis on 10/29 showed similar prior fluid collection with new fluid along the posterior aspect of the liver causing IVC compression. Patients case discussed with both IR and AES, both of whom do not feel he is a good candidate for drainage, either percutaneously/transhepatic/transpleural or endoscopically. ID consulted and antibiotics broadened to Meropenem + Diflucan on 10/30 after fevers on evening of 10/29. MS Contin increased to 30 mg BID. He underwent IR aspiration of his older collection on 11/2 w/ 7cc fluid aspirated. Fluid studies negative for infectious process at this time Repeat imaging with stable vs. Interval decrease in abdominal collections, though notable large R-sided pleural effusion. Course complicated by acute respiratory failure with effusion, possibly 2/2 SIRS/third spacing 2/2 hypoalbuminemia. Trial of diuresis yielding 3L urine output, though O2 requirements remain the same. Pulmonology consulted for pleural efffusion, patient refusing thoracentesis. PCA pump initiated after patient requiring 2mg dilaudid every 2H.       Interval History: NAEON. Yesterday with 2.5L UOP, net positive this admission. Patient refusing thoracentesis due pain concerns. On 3L NC.    Review of Systems   Constitutional:  Negative for appetite change, chills, fatigue and fever.   HENT:  " Negative for congestion and sore throat.    Respiratory:  Positive for shortness of breath. Negative for cough, choking, chest tightness and wheezing.    Cardiovascular:  Negative for chest pain and palpitations.   Gastrointestinal:  Positive for abdominal pain and nausea. Negative for constipation, diarrhea and vomiting.   Genitourinary:  Negative for dysuria and flank pain.   Musculoskeletal:  Positive for back pain. Negative for joint swelling.   Neurological:  Negative for dizziness and headaches.   Psychiatric/Behavioral:  Negative for confusion. The patient is not nervous/anxious.      Objective:     Vital Signs (Most Recent):  Temp: 98.5 °F (36.9 °C) (11/06/23 0355)  Pulse: 110 (11/06/23 0355)  Resp: 14 (11/06/23 0619)  BP: 116/76 (11/06/23 0355)  SpO2: 95 % (11/06/23 0355) Vital Signs (24h Range):  Temp:  [98.5 °F (36.9 °C)-99.2 °F (37.3 °C)] 98.5 °F (36.9 °C)  Pulse:  [] 110  Resp:  [9-27] 14  SpO2:  [93 %-97 %] 95 %  BP: (116-125)/(75-78) 116/76     Weight: 68 kg (150 lb)  Body mass index is 21.52 kg/m².    Intake/Output Summary (Last 24 hours) at 11/6/2023 0825  Last data filed at 11/6/2023 0619  Gross per 24 hour   Intake 1320 ml   Output 3700 ml   Net -2380 ml         Physical Exam  Vitals and nursing note reviewed.   Constitutional:       General: He is not in acute distress.     Appearance: Normal appearance. He is not ill-appearing.   HENT:      Head: Normocephalic.      Nose: Nose normal.      Mouth/Throat:      Mouth: Mucous membranes are moist.      Pharynx: Oropharynx is clear.   Eyes:      General:         Right eye: No discharge.         Left eye: No discharge.      Extraocular Movements: Extraocular movements intact.      Conjunctiva/sclera: Conjunctivae normal.   Cardiovascular:      Rate and Rhythm: Regular rhythm. Tachycardia present.      Heart sounds: No murmur heard.  Pulmonary:      Effort: Pulmonary effort is normal. No respiratory distress.      Breath sounds: No wheezing,  rhonchi or rales.      Comments: Decreased breath sounds of right lower lung zones.  Abdominal:      General: Abdomen is flat. There is no distension.      Palpations: There is no mass.      Tenderness: There is abdominal tenderness. There is guarding (voluntary). There is no right CVA tenderness, left CVA tenderness or rebound.   Musculoskeletal:         General: Normal range of motion.      Cervical back: Normal range of motion.      Right lower leg: No edema.      Left lower leg: No edema.   Skin:     General: Skin is warm and dry.      Capillary Refill: Capillary refill takes less than 2 seconds.      Findings: No lesion.   Neurological:      General: No focal deficit present.      Mental Status: He is alert and oriented to person, place, and time.   Psychiatric:         Mood and Affect: Mood normal.         Thought Content: Thought content normal.             Significant Labs: All pertinent labs within the past 24 hours have been reviewed.    Significant Imaging: I have reviewed all pertinent imaging results/findings within the past 24 hours.    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 Lipase mildly elevated. WBC and lactate WNL. On IV Cefepime for 4wk course. Recent discharge on 10/19 with MSIR and Morphine for pain control. EUS (10/23/23) complex collections along the neck, body, and tail of pancreas; though not suitable for endoscopic transmural stenting/drainage. Repeat CT (10/29) due to worsening abdominal pain despite high opioid doses, showing new fluid collection pressing on IVC. Discussed with AES, who will not be able to drain new collection endoscopically, and IR, who does not have a good sampling window and is reluctant to use transhepatic/transpleural  methods. General surgery consulted though no plans for acute intervention. Abx broadened from cefepime to meropenem + diflucan after fevering on 10/29; ID consulted. IR reconsulted for drainage of anterior collection; s/p IR aspiration w/ 7 cc output on 11/2. Repeat abdominal CT (11/3) with interval decrease of collection sizes, though now with surrounding inflammatory changes, likely 2/2 recent aspiration procedure and new large right sided effusion. Fluid analysis from pancreatic aspirate remains without evidence of infection at this time.     - Continue to follow cultures, amylase, other labs from pancreatic aspirate  - continue creon  - Meropenem and Diflucan started (10/30 - 11/13) per ID recs  - Midline team re-consulted for replacement, current one nearing 4 weeks   - pain control -  gabapentin 600 mg TID; discontinued MS contin and PRN dilaudid and started on PCA pump on 11/4. Patient still without adequate pain control after basal rate of 0.5 mg/hr and bolus dose of 0.2 mg with 15 minute lockout.      Acute hypoxemic respiratory failure  Patient found to have large pleural effusion on 11/3 CT scan, followed by CXR. Soon after, patient with new oxygen requirements and remains on 2-3L NC. Differentials for etiology include third spacing 2/2 low oncotic pressure, pancreaticopleural fistula following aspiration, among others. Slight improvement on repeat CXR (11/4 AM) after diuresis on 11/3. Urine output ~3L, though repeat CXR without significant improvement.      - Pulmonology evaluated patient for thoracentesis; patient refusing at this time. Should patient agree to thoracentesis, will need eliquis washout.  - Monitor urine output  - Lasix 20 mg today    Pleural effusion  See plan for 'Acute Hypoxemic Respiratory Failure'     Moderate malnutrition  Nutrition consulted. Most recent weight and BMI monitored-     Measurements:  Wt Readings from Last 1 Encounters:   10/26/23 68 kg (150 lb)   Body mass index is  21.52 kg/m².    Patient has been screened and assessed by RD.    Malnutrition Type:  Context: chronic illness  Level:      Malnutrition Characteristic Summary:  Weight Loss (Malnutrition): 5% in 1 month  Energy Intake (Malnutrition): less than 75% for greater than or equal to 1 month    Interventions/Recommendations (treatment strategy):  1. When medically able, ADAT Regular diet  2. Add Boost Breeze TID. Modify to Boost Plus (vanilla) when diet advanced.  3. RD to monitor and follow      Necrotizing pancreatitis  Plan as above      Sepsis  This patient does have evidence of infective focus  My overall impression is sepsis.  Source: Abdominal  Antibiotics given-   Antibiotics (72h ago, onward)      Start     Stop Route Frequency Ordered    11/01/23 1000  meropenem (MERREM) 1 g in sodium chloride 0.9 % 100 mL IVPB (MB+)         11/13/23 0159 IV Every 8 hours (non-standard times) 11/01/23 0855          Latest lactate reviewed-  Recent Labs   Lab 11/04/23  0636   LACTATE 0.6         Fluid challenge Not needed - patient is not hypotensive      Post- resuscitation assessment No - Post resuscitation assessment not needed       Will Not start Pressors- Levophed for MAP of 65  Source control achieved by: Meropenem, Diflucan    Therapeutic opioid-induced constipation (OIC)  Patient with extended course of high-potency opioids since being hospitalized multiple times for pancreatitis, starting on 9/17/23. KUB (10/27/23) notable for high-stool burden, though without focal dilatation, transition points, or free air. Patient with increased abdominal pain that is different in character and location from description of pain on admission, now including dull, achey pain in belt like distribution with intermittent nausea. Interval CT abdomen (10/30) with decreased stool burden following lactulose multiple times daily.     - Continue bowel regimen of pericolace BID and miralax BID   - can add lactulose/enemas if needed    Pancreatic  "pseudocyst/cyst  Plan as above    Hypokalemia  K 3.3 on admission; likely 2/2 to decreased PO intake    - Gave IV K 10meq Q1H for 6 doses   - Replete K PRN       Continuous severe abdominal pain  Likely 2/2 to pancreatitis; Discharged home on Morphine taper which was working well until yesterday morning    - See Chronic Pancreatitis       Alcohol use disorder  Hx of EtOH use disorder. On most recent admission, patient's ethanol negative in setting of pancreatitis flare.   - monitor for signs of withdrawal, though patient denies EtOH use in between recent admissions      Fluid collection of pancreas  Plan as above        Splenic vein thrombosis  Splenic vein thrombus originally seen on 9/20 imaging, and persistent on most recent CT abdominal imaging. Started on DOAC at last admission, loading dose completed.     - Continue eliquis 5 mg BID        Mild intermittent asthma without complication  History of asthma requiring PRN albuterol. Has not recently been using any inhaler treatment.     Normocytic anemia  Admit with hemoglobin 10.2; Baseline ~ 10    Lab Results   Component Value Date    HGB 8.6 (L) 11/05/2023     - Daily CBC   - Transfuse hgb <7       Polycythemia vera  Hgb 10.5 on admission. Follows up with hematology as outpatient.   - continue to monitor daily CBC      HIV infection  This patient in known to have HIV+ status (Have detected HIV PCR but never CD4 <200 or AIDS defining illness). Labs reviewed- No results found for: "CD4", No results found for: "HIVDNAPCR". Patient is on HAART. Will continue HAART. Continue to monitor routine labs. Last CD4 count was   Lab Results   Component Value Date    ABSOLUTECD4 1080 10/12/2023     - Continue home Biktarvy         VTE Risk Mitigation (From admission, onward)           Ordered     apixaban tablet 5 mg  2 times daily         10/24/23 0744                    Discharge Planning   ACSTILLO: 11/7/2023     Code Status: Full Code   Is the patient medically ready for " discharge?: No    Reason for patient still in hospital (select all that apply): Treatment  Discharge Plan A: Home with family   Discharge Delays: (!) Procedure Scheduling (IR, OR, Labs, Echo, Cath, Echo, EEG)      Krystal Adam MD  Department of Hospital Medicine   [facility] - Intensive Care ([place]-)

## 2023-11-07 ENCOUNTER — ANESTHESIA EVENT (OUTPATIENT)
Dept: INTENSIVE CARE | Facility: HOSPITAL | Age: 23
DRG: 438 | End: 2023-11-07
Payer: MEDICAID

## 2023-11-07 ENCOUNTER — ANESTHESIA (OUTPATIENT)
Dept: INTENSIVE CARE | Facility: HOSPITAL | Age: 23
DRG: 438 | End: 2023-11-07
Payer: MEDICAID

## 2023-11-07 LAB
ALBUMIN SERPL BCP-MCNC: 2.9 G/DL (ref 3.5–5.2)
ALP SERPL-CCNC: 66 U/L (ref 55–135)
ALT SERPL W/O P-5'-P-CCNC: 9 U/L (ref 10–44)
ANION GAP SERPL CALC-SCNC: 11 MMOL/L (ref 8–16)
AST SERPL-CCNC: 17 U/L (ref 10–40)
BASOPHILS # BLD AUTO: 0.02 K/UL (ref 0–0.2)
BASOPHILS NFR BLD: 0.4 % (ref 0–1.9)
BILIRUB SERPL-MCNC: 0.2 MG/DL (ref 0.1–1)
BUN SERPL-MCNC: 11 MG/DL (ref 6–20)
CALCIUM SERPL-MCNC: 9.3 MG/DL (ref 8.7–10.5)
CHLORIDE SERPL-SCNC: 98 MMOL/L (ref 95–110)
CO2 SERPL-SCNC: 29 MMOL/L (ref 23–29)
CREAT SERPL-MCNC: 0.6 MG/DL (ref 0.5–1.4)
DIFFERENTIAL METHOD: ABNORMAL
EOSINOPHIL # BLD AUTO: 0.4 K/UL (ref 0–0.5)
EOSINOPHIL NFR BLD: 6.8 % (ref 0–8)
ERYTHROCYTE [DISTWIDTH] IN BLOOD BY AUTOMATED COUNT: 19 % (ref 11.5–14.5)
EST. GFR  (NO RACE VARIABLE): >60 ML/MIN/1.73 M^2
GLUCOSE SERPL-MCNC: 106 MG/DL (ref 70–110)
HCT VFR BLD AUTO: 25 % (ref 40–54)
HGB BLD-MCNC: 7.6 G/DL (ref 14–18)
IMM GRANULOCYTES # BLD AUTO: 0.01 K/UL (ref 0–0.04)
IMM GRANULOCYTES NFR BLD AUTO: 0.2 % (ref 0–0.5)
LYMPHOCYTES # BLD AUTO: 1.6 K/UL (ref 1–4.8)
LYMPHOCYTES NFR BLD: 30.3 % (ref 18–48)
MAGNESIUM SERPL-MCNC: 1.9 MG/DL (ref 1.6–2.6)
MCH RBC QN AUTO: 26.7 PG (ref 27–31)
MCHC RBC AUTO-ENTMCNC: 30.4 G/DL (ref 32–36)
MCV RBC AUTO: 88 FL (ref 82–98)
MONOCYTES # BLD AUTO: 0.5 K/UL (ref 0.3–1)
MONOCYTES NFR BLD: 9.7 % (ref 4–15)
NEUTROPHILS # BLD AUTO: 2.8 K/UL (ref 1.8–7.7)
NEUTROPHILS NFR BLD: 52.6 % (ref 38–73)
NRBC BLD-RTO: 0 /100 WBC
PHOSPHATE SERPL-MCNC: 4.8 MG/DL (ref 2.7–4.5)
PLATELET # BLD AUTO: 320 K/UL (ref 150–450)
PMV BLD AUTO: 11.1 FL (ref 9.2–12.9)
POTASSIUM SERPL-SCNC: 3.9 MMOL/L (ref 3.5–5.1)
PROT SERPL-MCNC: 7.1 G/DL (ref 6–8.4)
RBC # BLD AUTO: 2.85 M/UL (ref 4.6–6.2)
SODIUM SERPL-SCNC: 138 MMOL/L (ref 136–145)
WBC # BLD AUTO: 5.28 K/UL (ref 3.9–12.7)

## 2023-11-07 PROCEDURE — 27000221 HC OXYGEN, UP TO 24 HOURS

## 2023-11-07 PROCEDURE — 63600175 PHARM REV CODE 636 W HCPCS

## 2023-11-07 PROCEDURE — 85025 COMPLETE CBC W/AUTO DIFF WBC: CPT

## 2023-11-07 PROCEDURE — 25000003 PHARM REV CODE 250

## 2023-11-07 PROCEDURE — 20600001 HC STEP DOWN PRIVATE ROOM

## 2023-11-07 PROCEDURE — 25000242 PHARM REV CODE 250 ALT 637 W/ HCPCS: Performed by: STUDENT IN AN ORGANIZED HEALTH CARE EDUCATION/TRAINING PROGRAM

## 2023-11-07 PROCEDURE — 94761 N-INVAS EAR/PLS OXIMETRY MLT: CPT

## 2023-11-07 PROCEDURE — 36415 COLL VENOUS BLD VENIPUNCTURE: CPT

## 2023-11-07 PROCEDURE — 25000003 PHARM REV CODE 250: Performed by: STUDENT IN AN ORGANIZED HEALTH CARE EDUCATION/TRAINING PROGRAM

## 2023-11-07 PROCEDURE — 99233 PR SUBSEQUENT HOSPITAL CARE,LEVL III: ICD-10-PCS | Mod: ,,, | Performed by: HOSPITALIST

## 2023-11-07 PROCEDURE — 63600175 PHARM REV CODE 636 W HCPCS: Performed by: ANESTHESIOLOGY

## 2023-11-07 PROCEDURE — 83735 ASSAY OF MAGNESIUM: CPT

## 2023-11-07 PROCEDURE — 99233 SBSQ HOSP IP/OBS HIGH 50: CPT | Mod: ,,, | Performed by: HOSPITALIST

## 2023-11-07 PROCEDURE — 63600175 PHARM REV CODE 636 W HCPCS: Performed by: STUDENT IN AN ORGANIZED HEALTH CARE EDUCATION/TRAINING PROGRAM

## 2023-11-07 PROCEDURE — 99900035 HC TECH TIME PER 15 MIN (STAT)

## 2023-11-07 PROCEDURE — 80053 COMPREHEN METABOLIC PANEL: CPT

## 2023-11-07 PROCEDURE — 25000003 PHARM REV CODE 250: Performed by: INTERNAL MEDICINE

## 2023-11-07 PROCEDURE — 94640 AIRWAY INHALATION TREATMENT: CPT

## 2023-11-07 PROCEDURE — 84100 ASSAY OF PHOSPHORUS: CPT

## 2023-11-07 RX ORDER — METHOCARBAMOL 750 MG/1
750 TABLET, FILM COATED ORAL 4 TIMES DAILY
Status: DISCONTINUED | OUTPATIENT
Start: 2023-11-07 | End: 2023-11-07

## 2023-11-07 RX ORDER — CELECOXIB 100 MG/1
200 CAPSULE ORAL DAILY
Status: DISCONTINUED | OUTPATIENT
Start: 2023-11-07 | End: 2023-11-10 | Stop reason: HOSPADM

## 2023-11-07 RX ORDER — DRONABINOL 5 MG/1
5 CAPSULE ORAL 2 TIMES DAILY
Status: DISCONTINUED | OUTPATIENT
Start: 2023-11-07 | End: 2023-11-07

## 2023-11-07 RX ORDER — DRONABINOL 5 MG/1
10 CAPSULE ORAL EVERY 12 HOURS
Status: DISCONTINUED | OUTPATIENT
Start: 2023-11-07 | End: 2023-11-10 | Stop reason: HOSPADM

## 2023-11-07 RX ORDER — PREGABALIN 150 MG/1
150 CAPSULE ORAL NIGHTLY
Status: DISCONTINUED | OUTPATIENT
Start: 2023-11-07 | End: 2023-11-10 | Stop reason: HOSPADM

## 2023-11-07 RX ORDER — FUROSEMIDE 10 MG/ML
20 INJECTION INTRAMUSCULAR; INTRAVENOUS ONCE
Status: COMPLETED | OUTPATIENT
Start: 2023-11-07 | End: 2023-11-07

## 2023-11-07 RX ORDER — AMOXICILLIN 250 MG
2 CAPSULE ORAL 2 TIMES DAILY
Status: DISCONTINUED | OUTPATIENT
Start: 2023-11-07 | End: 2023-11-10 | Stop reason: HOSPADM

## 2023-11-07 RX ORDER — METHOCARBAMOL 750 MG/1
750 TABLET, FILM COATED ORAL EVERY 6 HOURS
Status: DISCONTINUED | OUTPATIENT
Start: 2023-11-07 | End: 2023-11-10 | Stop reason: HOSPADM

## 2023-11-07 RX ORDER — ACETAMINOPHEN 500 MG
1000 TABLET ORAL EVERY 8 HOURS
Status: DISCONTINUED | OUTPATIENT
Start: 2023-11-07 | End: 2023-11-10 | Stop reason: HOSPADM

## 2023-11-07 RX ADMIN — IPRATROPIUM BROMIDE AND ALBUTEROL SULFATE 3 ML: .5; 3 SOLUTION RESPIRATORY (INHALATION) at 08:11

## 2023-11-07 RX ADMIN — PANTOPRAZOLE SODIUM 40 MG: 40 TABLET, DELAYED RELEASE ORAL at 09:11

## 2023-11-07 RX ADMIN — HYDROXYZINE PAMOATE 25 MG: 25 CAPSULE ORAL at 09:11

## 2023-11-07 RX ADMIN — PANCRELIPASE 2 CAPSULE: 30000; 6000; 19000 CAPSULE, DELAYED RELEASE PELLETS ORAL at 12:11

## 2023-11-07 RX ADMIN — Medication: at 09:11

## 2023-11-07 RX ADMIN — IPRATROPIUM BROMIDE AND ALBUTEROL SULFATE 3 ML: .5; 3 SOLUTION RESPIRATORY (INHALATION) at 01:11

## 2023-11-07 RX ADMIN — BICTEGRAVIR SODIUM, EMTRICITABINE, AND TENOFOVIR ALAFENAMIDE FUMARATE 1 TABLET: 50; 200; 25 TABLET ORAL at 09:11

## 2023-11-07 RX ADMIN — MEROPENEM 1 G: 1 INJECTION INTRAVENOUS at 01:11

## 2023-11-07 RX ADMIN — SENNOSIDES AND DOCUSATE SODIUM 2 TABLET: 8.6; 5 TABLET ORAL at 09:11

## 2023-11-07 RX ADMIN — MEROPENEM 1 G: 1 INJECTION INTRAVENOUS at 05:11

## 2023-11-07 RX ADMIN — FUROSEMIDE 20 MG: 10 INJECTION, SOLUTION INTRAMUSCULAR; INTRAVENOUS at 12:11

## 2023-11-07 RX ADMIN — APIXABAN 5 MG: 5 TABLET, FILM COATED ORAL at 09:11

## 2023-11-07 RX ADMIN — GABAPENTIN 600 MG: 300 CAPSULE ORAL at 09:11

## 2023-11-07 RX ADMIN — MEROPENEM 1 G: 1 INJECTION INTRAVENOUS at 09:11

## 2023-11-07 RX ADMIN — DRONABINOL 10 MG: 5 CAPSULE ORAL at 09:11

## 2023-11-07 RX ADMIN — FOLIC ACID 1 MG: 1 TABLET ORAL at 09:11

## 2023-11-07 RX ADMIN — PANCRELIPASE 2 CAPSULE: 30000; 6000; 19000 CAPSULE, DELAYED RELEASE PELLETS ORAL at 09:11

## 2023-11-07 RX ADMIN — DRONABINOL 5 MG: 5 CAPSULE ORAL at 09:11

## 2023-11-07 RX ADMIN — Medication 6 MG: at 09:11

## 2023-11-07 RX ADMIN — FLUCONAZOLE 400 MG: 200 TABLET ORAL at 09:11

## 2023-11-07 RX ADMIN — ONDANSETRON 4 MG: 2 INJECTION INTRAMUSCULAR; INTRAVENOUS at 09:11

## 2023-11-07 RX ADMIN — METHOCARBAMOL 750 MG: 750 TABLET ORAL at 05:11

## 2023-11-07 RX ADMIN — PREGABALIN 150 MG: 150 CAPSULE ORAL at 09:11

## 2023-11-07 RX ADMIN — PANCRELIPASE 2 CAPSULE: 30000; 6000; 19000 CAPSULE, DELAYED RELEASE PELLETS ORAL at 05:11

## 2023-11-07 RX ADMIN — CELECOXIB 200 MG: 100 CAPSULE ORAL at 09:11

## 2023-11-07 RX ADMIN — ACETAMINOPHEN 1000 MG: 500 TABLET ORAL at 09:11

## 2023-11-07 RX ADMIN — METHOCARBAMOL 750 MG: 750 TABLET ORAL at 09:11

## 2023-11-07 RX ADMIN — IPRATROPIUM BROMIDE AND ALBUTEROL SULFATE 3 ML: .5; 3 SOLUTION RESPIRATORY (INHALATION) at 09:11

## 2023-11-07 RX ADMIN — ACETAMINOPHEN 1000 MG: 500 TABLET ORAL at 02:11

## 2023-11-07 NOTE — SUBJECTIVE & OBJECTIVE
Interval History: NAEON. Heart rate in low 100s. Patient does not endorse any changes in pain and states he is not ready to go home. Endorses improvement with nausea and SOB.     Review of Systems   Constitutional:  Negative for appetite change, chills, fatigue and fever.   HENT:  Negative for congestion and sore throat.    Respiratory:  Negative for cough, choking, chest tightness, shortness of breath and wheezing.    Cardiovascular:  Negative for chest pain and palpitations.   Gastrointestinal:  Positive for abdominal pain. Negative for constipation, diarrhea, nausea and vomiting.   Genitourinary:  Negative for dysuria and flank pain.   Musculoskeletal:  Positive for back pain. Negative for joint swelling.   Neurological:  Negative for dizziness and headaches.   Psychiatric/Behavioral:  Negative for confusion. The patient is not nervous/anxious.      Objective:     Vital Signs (Most Recent):  Temp: 98.7 °F (37.1 °C) (11/07/23 0420)  Pulse: 97 (11/07/23 0905)  Resp: 14 (11/07/23 0905)  BP: 123/82 (11/07/23 0905)  SpO2: 99 % (11/07/23 0905) Vital Signs (24h Range):  Temp:  [98.2 °F (36.8 °C)-99.8 °F (37.7 °C)] 98.7 °F (37.1 °C)  Pulse:  [] 97  Resp:  [11-40] 14  SpO2:  [89 %-99 %] 99 %  BP: (112-127)/(71-82) 123/82     Weight: 68 kg (150 lb)  Body mass index is 21.52 kg/m².    Intake/Output Summary (Last 24 hours) at 11/7/2023 0942  Last data filed at 11/7/2023 0633  Gross per 24 hour   Intake 1438.34 ml   Output 2825 ml   Net -1386.66 ml         Physical Exam  Vitals and nursing note reviewed.   Constitutional:       General: He is not in acute distress.     Appearance: Normal appearance. He is not ill-appearing.   HENT:      Head: Normocephalic.      Nose: Nose normal.      Mouth/Throat:      Mouth: Mucous membranes are moist.      Pharynx: Oropharynx is clear.   Eyes:      General:         Right eye: No discharge.         Left eye: No discharge.      Extraocular Movements: Extraocular movements intact.       Conjunctiva/sclera: Conjunctivae normal.   Cardiovascular:      Rate and Rhythm: Regular rhythm. Tachycardia present.      Heart sounds: No murmur heard.  Pulmonary:      Effort: Pulmonary effort is normal. No respiratory distress.      Breath sounds: No wheezing, rhonchi or rales.      Comments: Decreased breath sounds of right lower lung zones.  Abdominal:      General: Abdomen is flat. There is no distension.      Palpations: There is no mass.      Tenderness: There is abdominal tenderness. There is guarding (voluntary). There is no right CVA tenderness, left CVA tenderness or rebound.   Musculoskeletal:         General: Normal range of motion.      Cervical back: Normal range of motion.      Right lower leg: No edema.      Left lower leg: No edema.   Skin:     General: Skin is warm and dry.      Capillary Refill: Capillary refill takes less than 2 seconds.      Findings: No lesion.   Neurological:      General: No focal deficit present.      Mental Status: He is alert and oriented to person, place, and time.   Psychiatric:         Mood and Affect: Mood normal.         Thought Content: Thought content normal.             Significant Labs: All pertinent labs within the past 24 hours have been reviewed.    Significant Imaging: I have reviewed all pertinent imaging results/findings within the past 24 hours.

## 2023-11-07 NOTE — ASSESSMENT & PLAN NOTE
Hx of EtOH use disorder. On most recent admission, patient's ethanol negative in setting of pancreatitis flare.   - monitor for signs of withdrawal, though patient denies EtOH use in between recent admissions     Yes

## 2023-11-07 NOTE — PLAN OF CARE
Recommendations     1. Continue Cardiac diet (liberalize to Regular if poor PO intake continues) + Boost Plus ONS; encourage PO intake as tolerated.   2. RD to monitor & follow-up.     Goals: Meet % EEN, EPN by RD f/u  Nutrition Goal Status: goal not met  Communication of RD Recs:  (POC)

## 2023-11-07 NOTE — PT/OT/SLP PROGRESS
"Physical Therapy Screen      Patient Name:  Tasia Cardona   MRN:  [id number]    Patient screened this date. Pt reports ambulating in room independently. Pt denies any concerns with walking and any LE weakness. Pt reports "I feel like I'm getting around well". No acute care PT needs at this time. Please re consult should situation change.    "

## 2023-11-07 NOTE — PROGRESS NOTES
[facility] - Intensive Care ([facility]-)  Adult Nutrition  Progress Note    SUMMARY       Recommendations    1. Continue Cardiac diet (liberalize to Regular if poor PO intake continues) + Boost Plus ONS; encourage PO intake as tolerated.   2. RD to monitor & follow-up.    Goals: Meet % EEN, EPN by RD f/u  Nutrition Goal Status: goal not met  Communication of RD Recs:  (POC)    Assessment and Plan    Moderate malnutrition    Malnutrition Type:  Context: chronic illness  Level: moderate    Related to (etiology):   Inadequate energy intake    Signs and Symptoms (as evidenced by):   Malnutrition Characteristic Summary:  Weight Loss (Malnutrition): 5% in 1 month  Energy Intake (Malnutrition): less than 75% for greater than or equal to 1 month    Interventions/Recommendations (treatment strategy):  Collaboration of nutrition care w/ other providers  ONS    Nutrition Diagnosis Status:   Continues    Malnutrition Assessment    Malnutrition Context: chronic illness    Weight Loss (Malnutrition): 5% in 1 month  Energy Intake (Malnutrition): less than 75% for greater than or equal to 1 month   Orbital Region (Subcutaneous Fat Loss): well nourished  Upper Arm Region (Subcutaneous Fat Loss): well nourished   [place] Region (Muscle Loss): well nourished  Clavicle Bone Region (Muscle Loss): well nourished  Clavicle and Acromion Bone Region (Muscle Loss): well nourished  Dorsal Hand (Muscle Loss): well nourished  Patellar Region (Muscle Loss): well nourished  Anterior Thigh Region (Muscle Loss): well nourished  Posterior Calf Region (Muscle Loss): well nourished     Reason for Assessment    Reason For Assessment: RD follow-up  Diagnosis:  (chronic pancreatitis)  Relevant Medical History: HIV, normocytic anemia, alcohol use disorder, necrotizing pancreatitis  Interdisciplinary Rounds: did not attend    General Information Comments: Pt continues w/ poor appetite, consuming 25% of meals, trying to drink 1 ONS/day. NFPE complete  "10/31 - pt meets criteria for moderate malnutrition; please see PES statement for details.   Nutrition Discharge Planning: Pending medical course    Nutrition/Diet History    Spiritual, Cultural Beliefs, Catholic Practices, Values that Affect Care: no  Factors Affecting Nutritional Intake: decreased appetite    Anthropometrics    Temp: 98.7 °F (37.1 °C)  Height Method: Stated  Height: 5' 10" (177.8 cm)  Height (inches): 70 in  Weight Method: Bed Scale  Weight: 68 kg (149 lb 14.6 oz)  Weight (lb): 149.91 lb  Ideal Body Weight (IBW), Male: 166 lb  % Ideal Body Weight, Male (lb): 90.31 %  BMI (Calculated): 21.5  BMI Grade: 18.5-24.9 - normal    Lab/Procedures/Meds    Pertinent Labs Reviewed: reviewed  Pertinent Labs Comments: P 4.8  Pertinent Medications Reviewed: reviewed  Pertinent Medications Comments: -    Estimated/Assessed Needs    Weight Used For Calorie Calculations: 68 kg (149 lb 14.6 oz)    Energy Calorie Requirements (kcal): 2095 kcal/d  Energy Need Method: Chrisney-St Jeor (1.25 PAL)    Protein Requirements: 82 g/d (1.2 g/kg)  Weight Used For Protein Calculations: 68 kg (149 lb 14.6 oz)    Estimated Fluid Requirement Method: other (see comments) (Per MD or 1 mL/kcal)  RDA Method (mL): 2095    Nutrition Prescription Ordered    Current Diet Order: Cardiac  Oral Nutrition Supplement: Boost Plus    Evaluation of Received Nutrient/Fluid Intake    I/O: -9.6L since 10/24    Comments: LBM: 11/4    Tolerance: tolerating    Nutrition Risk    Level of Risk/Frequency of Follow-up:  (1x/week)     Monitor and Evaluation    Food and Nutrient Intake: energy intake, food and beverage intake  Food and Nutrient Adminstration: diet order, enteral and parenteral nutrition administration  Physical Activity and Function: nutrition-related ADLs and IADLs  Anthropometric Measurements: height/length, weight, weight change, body mass index  Biochemical Data, Medical Tests and Procedures: electrolyte and renal panel, gastrointestinal " profile, lipid profile, glucose/endocrine profile, inflammatory profile  Nutrition-Focused Physical Findings: overall appearance     Nutrition Follow-Up    RD Follow-up?: Yes

## 2023-11-07 NOTE — ASSESSMENT & PLAN NOTE
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 Lipase mildly elevated. WBC and lactate WNL. On IV Cefepime for 4wk course. Recent discharge on 10/19 with MSIR and Morphine for pain control. EUS (10/23/23) complex collections along the neck, body, and tail of pancreas; though not suitable for endoscopic transmural stenting/drainage. Repeat CT (10/29) due to worsening abdominal pain despite high opioid doses, showing new fluid collection pressing on IVC. Discussed with AES, who will not be able to drain new collection endoscopically, and IR, who does not have a good sampling window and is reluctant to use transhepatic/transpleural methods. General surgery consulted though no plans for acute intervention. Abx broadened from cefepime to meropenem + diflucan after fevering on 10/29; ID consulted. IR reconsulted for drainage of anterior collection; s/p IR aspiration w/ 7 cc output on 11/2. Repeat abdominal CT (11/3) with interval decrease of collection sizes, though now with surrounding inflammatory changes, likely 2/2 recent aspiration procedure and new large right sided effusion. Fluid analysis from pancreatic aspirate remains without evidence of infection at this time.     - Continue to follow pancreatic fluid studies; NGTD  - continue creon  - Meropenem and Diflucan started (10/30 - 11/13) per ID recs  - Midline replaced 11/6   - Pain poorly controlled,  patient requesting IV dilaudid q2h while on PCA pump  - Pain management consulted, appreciate recs

## 2023-11-07 NOTE — PLAN OF CARE
Problem: Fluid Imbalance (Pancreatitis)  Goal: Fluid Balance  Outcome: Ongoing, Progressing     Problem: Infection (Pancreatitis)  Goal: Infection Symptom Resolution  Outcome: Ongoing, Progressing     Problem: Nutrition Impaired (Pancreatitis)  Goal: Optimal Nutrition Intake  Outcome: Ongoing, Progressing     Problem: Pain (Pancreatitis)  Goal: Acceptable Pain Control  Outcome: Ongoing, Progressing     Problem: Respiratory Compromise (Pancreatitis)  Goal: Effective Oxygenation and Ventilation  Outcome: Ongoing, Progressing     Problem: Adjustment to Illness (Sepsis/Septic Shock)  Goal: Optimal Coping  Outcome: Ongoing, Progressing     Problem: Bleeding (Sepsis/Septic Shock)  Goal: Absence of Bleeding  Outcome: Ongoing, Progressing     Problem: Infection Progression (Sepsis/Septic Shock)  Goal: Absence of Infection Signs and Symptoms  Outcome: Ongoing, Progressing     Problem: Nutrition Impaired (Sepsis/Septic Shock)  Goal: Optimal Nutrition Intake  Outcome: Ongoing, Progressing     Problem: Fall Injury Risk  Goal: Absence of Fall and Fall-Related Injury  Outcome: Ongoing, Progressing

## 2023-11-07 NOTE — ANESTHESIA POST-OP PAIN MANAGEMENT
Consult  Anesthesiology Acute Pain Service      SUBJECTIVE:     Chief Complaint/Reason for Consult: Request anesthesia assistance with continuous pain management due to high complexity/refractory pain.    History of Present Illness:  Patient is a 23 y.o. male with a PMH of etoh-induced necrotizing pancreatitis and HIV who was admitted for intractable abdominal pain and vomiting. Pain began 4 months ago, and he has been in and out of the hospital for the past 2 months. Pain is described as stabbing pain along the RUQ and lower abdomen, nonradiating.    Anesthesiology is consulted for assistance with pain management     The current inpatient regimen is:  PO gabapentin 600mg tid  IV dilaudid PCA, 0.5mg basal rate, 0.2mg bolus dose with 15min lockout, max 1.3mg/hr     HYDROmorphone PCA syringe 15 mg/30 mL (0.5 mg/mL) NS - HIGH CONC         Patient Active Problem List   Diagnosis    HIV infection    Polycythemia vera    Normocytic anemia    Mild intermittent asthma without complication    Chronic pancreatitis    Splenic vein thrombosis    Positive CMV IgG serology    Fluid collection of pancreas    Alcohol use disorder    Continuous severe abdominal pain    Hypokalemia    Pancreatic pseudocyst/cyst    Therapeutic opioid-induced constipation (OIC)    Sepsis    Necrotizing pancreatitis    Moderate malnutrition    Pleural effusion    Acute hypoxemic respiratory failure       Review of patient's allergies indicates:   Allergen Reactions    [place] Swelling    Pcn [penicillins] Hives     Tolerates cephalosporins    Pecan nut Swelling    Sulfa (sulfonamide antibiotics) Hives       Outpatient Medications:   No current facility-administered medications on file prior to encounter.     Current Outpatient Medications on File Prior to Encounter   Medication Sig Dispense Refill    apixaban (ELIQUIS) 5 mg Tab Take 1 tablet (5 mg total) by mouth 2 (two) times daily. 60 tablet 0    ejzbbknrj-yoeyaclh-dagtatx ala (BIKTARVY) -25 mg  (25 kg or greater) Take 1 tablet by mouth once daily.      dextrose 5 % in water (D5W) PgBk 100 mL with ceFEPIme 2 gram SolR 2 g Inject 2 g into the vein every 8 (eight) hours.      morphine (MS CONTIN) 15 MG 12 hr tablet Take 1 tablet (15 mg total) by mouth 2 (two) times daily for 14 days, THEN 1 tablet (15 mg total) once daily for 14 days. 42 tablet 0    morphine (MSIR) 15 MG tablet Take 1 tablet (15 mg total) by mouth every 6 (six) hours as needed for Pain (severe, breakthrough). 42 tablet 0    promethazine (PHENERGAN) 25 MG tablet Take 25 mg by mouth every 4 (four) hours as needed for Nausea.          Current Inpatient Medications:   albuterol-ipratropium  3 mL Nebulization Q6H WAKE    apixaban  5 mg Oral BID    kjqwvuifv-utrufhcv-yolvyly ala  1 tablet Oral Daily    fluconazole  400 mg Oral Daily    folic acid  1 mg Oral Daily    gabapentin  600 mg Oral TID    lipase-protease-amylase 6,000-19,000-30,000 units  2 capsule Oral TID WM    meropenem (MERREM) IVPB  1 g Intravenous Q8H    pantoprazole  40 mg Oral BID    senna-docusate 8.6-50 mg  2 tablet Oral BID       PRN:  acetaminophen, dextrose 10%, dextrose 10%, glucagon (human recombinant), glucose, glucose, hydrOXYzine pamoate, iohexol, melatonin, naloxone, ondansetron, oxymetazoline, prochlorperazine, sodium chloride 0.9%, sodium chloride 0.9%    Past Surgical History:   Procedure Laterality Date    ENDOSCOPIC ULTRASOUND OF UPPER GASTROINTESTINAL TRACT N/A 10/23/2023    Procedure: ULTRASOUND, UPPER GI TRACT, ENDOSCOPIC;  Surgeon: Emil Villatoro MD;  Location: [address]);  Service: Endoscopy;  Laterality: N/A;    ERCP N/A 10/23/2023    Procedure: ERCP (ENDOSCOPIC RETROGRADE CHOLANGIOPANCREATOGRAPHY);  Surgeon: Emil Villatoro MD;  Location: [address]);  Service: Endoscopy;  Laterality: N/A;    ESOPHAGOGASTRODUODENOSCOPY N/A 9/18/2023    Procedure: EGD (ESOPHAGOGASTRODUODENOSCOPY);  Surgeon: Kg Cleaning MD;  Location: [facility] ENDO (2ND FLR);   Service: Endoscopy;  Laterality: N/A;       Social History     Socioeconomic History    Marital status: Single   Tobacco Use    Smoking status: Former     Types: Cigarettes    Smokeless tobacco: Never   Substance and Sexual Activity    Alcohol use: Not Currently    Drug use: Never     Social Determinants of Health     Financial Resource Strain: Low Risk  (10/23/2023)    Overall Financial Resource Strain (CARDIA)     Difficulty of Paying Living Expenses: Not very hard   Food Insecurity: No Food Insecurity (10/23/2023)    Hunger Vital Sign     Worried About Running Out of Food in the Last Year: Never true     Ran Out of Food in the Last Year: Never true   Transportation Needs: No Transportation Needs (10/23/2023)    PRAPARE - Transportation     Lack of Transportation (Medical): No     Lack of Transportation (Non-Medical): No   Physical Activity: Inactive (9/18/2023)    Exercise Vital Sign     Days of Exercise per Week: 0 days     Minutes of Exercise per Session: 0 min   Stress: Stress Concern Present (9/18/2023)    Citizen of [place] of Occupational Health - Occupational Stress Questionnaire     Feeling of Stress : To some extent   Social Connections: Socially Isolated (10/23/2023)    Social Connection and Isolation Panel [NHANES]     Frequency of Communication with Friends and Family: Three times a week     Frequency of Social Gatherings with Friends and Family: Three times a week     Attends Mormonism Services: Never     Active Member of Clubs or Organizations: No     Attends Club or Organization Meetings: Never     Marital Status: Never    Housing Stability: Low Risk  (10/23/2023)    Housing Stability Vital Sign     Unable to Pay for Housing in the Last Year: No     Number of Places Lived in the Last Year: 1     Unstable Housing in the Last Year: No       Review of Systems:  As above. Additionally:     Constitutional: Negative for fever, diaphoresis, activity change, appetite change and fatigue.   HENT:  Negative for congestion, postnasal drip, rhinorrhea, sinus pressure, sore throat, trouble swallowing and voice change.    Eyes: Negative for photophobia, pain and visual disturbance.   Respiratory: Negative for cough, shortness of breath and wheezing.    Cardiovascular: Negative for chest pain, palpitations and leg swelling.   Gastrointestinal: Negative for diarrhea, constipation and abdominal distention.   Endocrine: Negative for polydipsia, polyphagia and polyuria.   Genitourinary: Negative for dysuria, frequency, hematuria and difficulty urinating.   Musculoskeletal: Negative for back pain, arthralgias, neck pain and neck stiffness.   Skin: Negative for color change, pallor, rash and wound.   Neurological: Negative for dizziness, seizures, syncope, weakness and headaches.   Hematological: Negative for adenopathy. Does not bruise/bleed easily.   Psychiatric/Behavioral: Negative for sleep disturbance and dysphoric mood. The patient is not nervous/anxious.      OBJECTIVE:     Current Vital Signs  Temp: 37.1 °C (98.7 °F) (11/07/23 0420)  Pulse: 101 (11/07/23 0420)  Resp: 11 (11/07/23 0420)  BP: 112/71 (11/07/23 0420)  SpO2: 98 % (11/07/23 0420)    Vital Signs Range (Last 24H):  Temp:  [36.8 °C (98.2 °F)-37.7 °C (99.8 °F)]   Pulse:  []   Resp:  [11-40]   BP: (112-131)/(71-82)   SpO2:  [87 %-99 %]     I & O (Last 24H):  Intake/Output Summary (Last 24 hours) at 11/7/2023 0858  Last data filed at 11/7/2023 0633  Gross per 24 hour   Intake 1438.34 ml   Output 3275 ml   Net -1836.66 ml       Physical Exam:  Constitutional: Patient is oriented to person, place, and time. Patient appears well-developed and well-nourished. No distress.   HENT: Head: Normocephalic and atraumatic. Right Ear: External ear normal. Left Ear: External ear normal. Nose: Nose normal.   Mouth/Throat: Oropharynx is clear and moist. No oropharyngeal exudate.   Eyes: Conjunctivae and EOM are normal. Pupils are equal, round, and reactive to light.  Right eye exhibits no discharge. Left eye exhibits no discharge. No scleral icterus.   Neck: Normal range of motion. Neck supple. No JVD present. No tracheal deviation present. No thyromegaly present.   Cardiovascular: Normal rate, regular rhythm, normal heart sounds and intact distal pulses.  Exam reveals no gallop and no friction rub.  No murmur heard.  Pulmonary/Chest: Effort normal and breath sounds normal. No respiratory distress. Patient has no wheezes. Patient has no rales.   Abdominal: Soft. Bowel sounds are normal. Patient exhibits no distension and no mass. RUQ tenderness without rebound with guarding.  Musculoskeletal: Normal range of motion. Patient exhibits no edema or tenderness.   Lymphadenopathy:   Patient has no cervical adenopathy.   Neurological: Patient is alert and oriented to person, place, and time. Patient has normal reflexes. Patient exhibits normal muscle tone. Coordination normal.   Skin: Skin is warm and dry. No rash noted. Patient is not diaphoretic. No erythema. No pallor.   Psychiatric: Patient has a normal mood and affect. Behavior is normal. Judgment and thought content normal.     Laboratory:  CBC:   Recent Labs     11/06/23 0254 11/07/23 0239   WBC 6.54 5.28   RBC 2.99* 2.85*   HGB 8.0* 7.6*   HCT 26.0* 25.0*    320   MCV 87 88   MCH 26.8* 26.7*   MCHC 30.8* 30.4*       CMP:   Recent Labs     11/06/23 0254 11/07/23 0239   * 138   K 3.6 3.9   CO2 27 29   CL 97 98   BUN 7 11   CREATININE 0.6 0.6    106   MG 1.8 1.9   PHOS 4.2 4.8*   CALCIUM 9.1 9.3   ALBUMIN 2.8* 2.9*   PROT 6.8 7.1   ALKPHOS 66 66   ALT 8* 9*   AST 15 17   BILITOT 0.3 0.2       Diagnostic Radiology:  11/3/23 CT abd/pel  Impression:     In this patient with necrotizing pancreatitis there is redemonstration of the multilocular fluid collection in the upper abdomen as well as including the pancreatic body and tail.  Given previous findings of necrotizing pancreatitis, these collections may  represent walled-off pancreatic necrosis given time frame.     There has been interval aspiration of the dominant collection in the mid abdomen since the prior examination with development of foci of gas within this collection, likely related to the procedure.  This collection as well as few of the other collections (the previously mentioned collection posterior to the liver as well as the collection along the greater curvature of the stomach) have decreased in size from prior with similar appearance of the collections involving the pancreatic body/tail.  However, there is worsened surrounding inflammatory change and free fluid in the abdomen with intraperitoneal foci of gas.     Worsened large volume right pleural effusion with associated right lower lobe atelectasis and similar left lower lobe small volume pleural effusion with associated atelectasis.    ASSESSMENT/PLAN:     Active Hospital Problems    Diagnosis  POA    *Chronic pancreatitis [K86.1]  Yes    Acute hypoxemic respiratory failure [J96.01]  Yes    Pleural effusion [J90]  Unknown    Necrotizing pancreatitis [K85.91]  Yes    Moderate malnutrition [E44.0]  Yes    Sepsis [A41.9]  No     10/29/23 vitals      Therapeutic opioid-induced constipation (OIC) [K59.03, T40.2X5A]  No    Pancreatic pseudocyst/cyst [K86.2, K86.3]  Yes    Alcohol use disorder [F10.90]  Yes    Fluid collection of pancreas [K86.89]  Yes    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       Plan:    22 y/o M with necrotizing pancreatitis presents with intractable abdominal pain resistant to high dose opioids on PCA. Patient was not on home opioids prior to 2 months ago. Recommend multimodal pain regimen as follows (all ordered):     PO tylenol 1g q8h  PO celebrex 200mg daily  PO robaxin 750mg q6h  PO dronabinol 5mg  daily  D/c gabapentin and start PO lyrica 150mg qhs  Remove basal rate on PCA, increase frequency of bolus doses, lower max hourly dose    Thank you for this consult.  We will continue to follow.  Please feel free to re-consult us as necessary.    Jennifer Carter MD  [id number]  [facility]

## 2023-11-07 NOTE — ASSESSMENT & PLAN NOTE
Admit with hemoglobin 10.2; Baseline ~ 10    Lab Results   Component Value Date    HGB 7.6 (L) 11/07/2023     - Daily CBC   - Transfuse hgb <7

## 2023-11-07 NOTE — PROGRESS NOTES
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 He has been in and out of the hospital for the last two weeks. His most recent discharge was on10/19 after admission for recurrent symptoms of his acute necrotizing pancreatitis. He was discharged home on MSIR and Morphine which was working well until yesterday morning (10/21) when he started having increased abdominal pain w/nausea. He attempted to eat something but vomited. He denies any recent alcohol or tobacco use. He reports eating normally since discharge. Last meal was a grilled cheese yesterday for dinner. Last BM was loose and "oily" but normal color w/out blood present. He was evaluated by AES on his 10/5 admission who recommended against any intervention on the pancreatic collection due to immaturity. He has been on IV Cefepime since 10/5 with possible end date 11/2.     In the ED, he was afebrile and tachycardic to the 130s. Labs were notable for K 3.3, Bicarb 21, BUN 5, Alb 3.0, Lipase 96. Lactate WNL. Repeat CT A/P showed small R pleural effusion but no acute intra-abdominopelvic abnormality. Peripancreatic fluid collection at the pancreatic tail stable from previous imaging. He was given Dilaudid 2mg x1, Dilaudid 1mg x2 for pain and his evening dose of Cefepime. Hospital Medicine was called for admission of     Overview/Hospital Course:  Admitted to hospital medicine. Cefepime and MS contin continued, with addition of " "scheduled tylenol + toradol and then breakthrough dilaudid. AES consulted again now that imaging findings were stable >4w since initial diagnosis and persistent symptoms. Underwent EUS with findings of stable fluid collection which was not drained ("Complex collections along the neck, body, and tail of pancreas (body/tail region likely connected). Not suitable for endoscopic transmural stenting/drainage.") He will need follow-up CT scan in 4-6 weeks. Cefepime to complete 11/2/23. Patient with increasing pain and inadequate pain control despite dilaudid 2mg q4h w/ MS Contin 15mg bid and multimodal pain medication. A repeat CT Abd/Pelvis on 10/29 showed similar prior fluid collection with new fluid along the posterior aspect of the liver causing IVC compression. Patients case discussed with both IR and AES, both of whom do not feel he is a good candidate for drainage, either percutaneously/transhepatic/transpleural or endoscopically. ID consulted and antibiotics broadened to Meropenem + Diflucan on 10/30 after fevers on evening of 10/29. MS Contin increased to 30 mg BID. He underwent IR aspiration of his older collection on 11/2 w/ 7cc fluid aspirated. Fluid studies negative for infectious process at this time Repeat imaging with stable vs. Interval decrease in abdominal collections, though notable large R-sided pleural effusion. Course complicated by acute respiratory failure with effusion, possibly 2/2 SIRS/third spacing 2/2 hypoalbuminemia. Trial of diuresis yielding 3L urine output, though O2 requirements remain the same. Pulmonology consulted for pleural efffusion, patient refusing thoracentesis. PCA pump initiated after patient requiring 2mg dilaudid every 2H. Pain management team consulted for assistance.      Interval History: NAEON. Heart rate in low 100s. Patient does not endorse any changes in pain and states he is not ready to go home. Endorses improvement with nausea and SOB.     Review of Systems "   Constitutional:  Negative for appetite change, chills, fatigue and fever.   HENT:  Negative for congestion and sore throat.    Respiratory:  Negative for cough, choking, chest tightness, shortness of breath and wheezing.    Cardiovascular:  Negative for chest pain and palpitations.   Gastrointestinal:  Positive for abdominal pain. Negative for constipation, diarrhea, nausea and vomiting.   Genitourinary:  Negative for dysuria and flank pain.   Musculoskeletal:  Positive for back pain. Negative for joint swelling.   Neurological:  Negative for dizziness and headaches.   Psychiatric/Behavioral:  Negative for confusion. The patient is not nervous/anxious.      Objective:     Vital Signs (Most Recent):  Temp: 98.7 °F (37.1 °C) (11/07/23 0420)  Pulse: 97 (11/07/23 0905)  Resp: 14 (11/07/23 0905)  BP: 123/82 (11/07/23 0905)  SpO2: 99 % (11/07/23 0905) Vital Signs (24h Range):  Temp:  [98.2 °F (36.8 °C)-99.8 °F (37.7 °C)] 98.7 °F (37.1 °C)  Pulse:  [] 97  Resp:  [11-40] 14  SpO2:  [89 %-99 %] 99 %  BP: (112-127)/(71-82) 123/82     Weight: 68 kg (150 lb)  Body mass index is 21.52 kg/m².    Intake/Output Summary (Last 24 hours) at 11/7/2023 0942  Last data filed at 11/7/2023 0633  Gross per 24 hour   Intake 1438.34 ml   Output 2825 ml   Net -1386.66 ml         Physical Exam  Vitals and nursing note reviewed.   Constitutional:       General: He is not in acute distress.     Appearance: Normal appearance. He is not ill-appearing.   HENT:      Head: Normocephalic.      Nose: Nose normal.      Mouth/Throat:      Mouth: Mucous membranes are moist.      Pharynx: Oropharynx is clear.   Eyes:      General:         Right eye: No discharge.         Left eye: No discharge.      Extraocular Movements: Extraocular movements intact.      Conjunctiva/sclera: Conjunctivae normal.   Cardiovascular:      Rate and Rhythm: Regular rhythm. Tachycardia present.      Heart sounds: No murmur heard.  Pulmonary:      Effort: Pulmonary effort  is normal. No respiratory distress.      Breath sounds: No wheezing, rhonchi or rales.      Comments: Decreased breath sounds of right lower lung zones.  Abdominal:      General: Abdomen is flat. There is no distension.      Palpations: There is no mass.      Tenderness: There is abdominal tenderness. There is guarding (voluntary). There is no right CVA tenderness, left CVA tenderness or rebound.   Musculoskeletal:         General: Normal range of motion.      Cervical back: Normal range of motion.      Right lower leg: No edema.      Left lower leg: No edema.   Skin:     General: Skin is warm and dry.      Capillary Refill: Capillary refill takes less than 2 seconds.      Findings: No lesion.   Neurological:      General: No focal deficit present.      Mental Status: He is alert and oriented to person, place, and time.   Psychiatric:         Mood and Affect: Mood normal.         Thought Content: Thought content normal.             Significant Labs: All pertinent labs within the past 24 hours have been reviewed.    Significant Imaging: I have reviewed all pertinent imaging results/findings within the past 24 hours.    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 Lipase mildly elevated. WBC and lactate WNL. On IV Cefepime for 4wk course. Recent discharge on 10/19 with MSIR and Morphine for pain control. EUS (10/23/23) complex collections along the neck, body, and tail of pancreas; though not suitable for endoscopic transmural stenting/drainage. Repeat CT (10/29) due to worsening abdominal pain despite high opioid doses, showing new fluid collection pressing on IVC. Discussed with AES, who will not be able to drain new collection endoscopically, and IR, who does not have a good  sampling window and is reluctant to use transhepatic/transpleural methods. General surgery consulted though no plans for acute intervention. Abx broadened from cefepime to meropenem + diflucan after fevering on 10/29; ID consulted. IR reconsulted for drainage of anterior collection; s/p IR aspiration w/ 7 cc output on 11/2. Repeat abdominal CT (11/3) with interval decrease of collection sizes, though now with surrounding inflammatory changes, likely 2/2 recent aspiration procedure and new large right sided effusion. Fluid analysis from pancreatic aspirate remains without evidence of infection at this time.     - Continue to follow pancreatic fluid studies; NGTD  - continue creon  - Meropenem and Diflucan started (10/30 - 11/13) per ID recs  - Midline replaced 11/6   - Pain poorly controlled,  patient requesting IV dilaudid q2h while on PCA pump  - Pain management consulted, appreciate recs     Acute hypoxemic respiratory failure  Patient found to have large pleural effusion on 11/3 CT scan, followed by CXR. Soon after, patient with new oxygen requirements and remains on 2-3L NC. Differentials for etiology include third spacing 2/2 low oncotic pressure, pancreaticopleural fistula following aspiration, among others. Slight improvement on repeat CXR (11/4 AM) after diuresis on 11/3. Urine output ~3L, though repeat CXR without significant improvement.      - Pulmonology evaluated patient for thoracentesis; patient refusing at this time. Should patient agree to thoracentesis, will need eliquis washout.  - Monitor urine output  - Lasix 20 mg today    Pleural effusion  See plan for 'Acute Hypoxemic Respiratory Failure'     Moderate malnutrition  Nutrition consulted. Most recent weight and BMI monitored-     Measurements:  Wt Readings from Last 1 Encounters:   10/26/23 68 kg (150 lb)   Body mass index is 21.52 kg/m².    Patient has been screened and assessed by RD.    Malnutrition Type:  Context: chronic illness  Level:   "    Malnutrition Characteristic Summary:  Weight Loss (Malnutrition): 5% in 1 month  Energy Intake (Malnutrition): less than 75% for greater than or equal to 1 month    Interventions/Recommendations (treatment strategy):  1. When medically able, ADAT Regular diet  2. Add Boost Breeze TID. Modify to Boost Plus (vanilla) when diet advanced.  3. RD to monitor and follow      Necrotizing pancreatitis  Plan as above      Sepsis  This patient does have evidence of infective focus  My overall impression is sepsis.  Source: Abdominal  Antibiotics given-   Antibiotics (72h ago, onward)      Start     Stop Route Frequency Ordered    11/01/23 1000  meropenem (MERREM) 1 g in sodium chloride 0.9 % 100 mL IVPB (MB+)         11/13/23 0159 IV Every 8 hours (non-standard times) 11/01/23 0855          Latest lactate reviewed-  No results for input(s): "LACTATE" in the last 72 hours.      Fluid challenge Not needed - patient is not hypotensive      Post- resuscitation assessment No - Post resuscitation assessment not needed       Will Not start Pressors- Levophed for MAP of 65  Source control achieved by: Meropenem, Diflucan    Therapeutic opioid-induced constipation (OIC)  Patient with extended course of high-potency opioids since being hospitalized multiple times for pancreatitis, starting on 9/17/23. KUB (10/27/23) notable for high-stool burden, though without focal dilatation, transition points, or free air. Patient with increased abdominal pain that is different in character and location from description of pain on admission, now including dull, achey pain in belt like distribution with intermittent nausea. Interval CT abdomen (10/30) with decreased stool burden following lactulose multiple times daily.     - Continue bowel regimen of pericolace BID and miralax BID   - can add lactulose/enemas if needed    Pancreatic pseudocyst/cyst  Plan as above    Hypokalemia  K 3.3 on admission; likely 2/2 to decreased PO intake    - Gave IV " "K 10meq Q1H for 6 doses   - Replete K PRN       Continuous severe abdominal pain  Likely 2/2 to pancreatitis; Discharged home on Morphine taper which was working well until yesterday morning    - See Chronic Pancreatitis       Alcohol use disorder  Hx of EtOH use disorder. On most recent admission, patient's ethanol negative in setting of pancreatitis flare.   - monitor for signs of withdrawal, though patient denies EtOH use in between recent admissions      Fluid collection of pancreas  Plan as above        Splenic vein thrombosis  Splenic vein thrombus originally seen on 9/20 imaging, and persistent on most recent CT abdominal imaging. Started on DOAC at last admission, loading dose completed.     - Continue eliquis 5 mg BID        Mild intermittent asthma without complication  History of asthma requiring PRN albuterol. Has not recently been using any inhaler treatment.     Normocytic anemia  Admit with hemoglobin 10.2; Baseline ~ 10    Lab Results   Component Value Date    HGB 7.6 (L) 11/07/2023     - Daily CBC   - Transfuse hgb <7       Polycythemia vera  Hgb 10.5 on admission. Follows up with hematology as outpatient.   - continue to monitor daily CBC      HIV infection  This patient in known to have HIV+ status (Have detected HIV PCR but never CD4 <200 or AIDS defining illness). Labs reviewed- No results found for: "CD4", No results found for: "HIVDNAPCR". Patient is on HAART. Will continue HAART. Continue to monitor routine labs. Last CD4 count was   Lab Results   Component Value Date    ABSOLUTECD4 1080 10/12/2023     - Continue home Biktarvy         VTE Risk Mitigation (From admission, onward)           Ordered     apixaban tablet 5 mg  2 times daily         10/24/23 0744                  Discharge Planning   CASTILLO: 11/10/2023     Code Status: Full Code   Is the patient medically ready for discharge?: No    Reason for patient still in hospital (select all that apply): Treatment  Discharge Plan A: Home with " family   Discharge Delays: (!) Procedure Scheduling (IR, OR, Labs, Echo, Cath, Echo, EEG)      Krystal Adam MD  Department of Hospital Medicine   [facility] - Intensive Care ([place]-[facility])

## 2023-11-07 NOTE — CARE UPDATE
RAPID RESPONSE NURSE ROUND       Rounding completed with charge RNBrad for nose bleed and tachycardia reports bleed resolved, HR return to base line. No additional concerns verbalized at this time. Instructed to call [phone] for further concerns or assistance.

## 2023-11-07 NOTE — PT/OT/SLP PROGRESS
Occupational Therapy  Screen    Patient Name:  Tasia Cardona   MRN:  [id number]    Patient screen on this date. Pt. And nursing confirmed pt. Ambulating in room without assist to bathroom. Pt. Does not feel as though he has any therapy needs at this time. Pt. Advised to sit in chair during the day as able to assist with maintaining his endurance. No OT needs at this time. .    11/7/2023

## 2023-11-08 LAB
ALBUMIN SERPL BCP-MCNC: 2.8 G/DL (ref 3.5–5.2)
ALP SERPL-CCNC: 87 U/L (ref 55–135)
ALT SERPL W/O P-5'-P-CCNC: 10 U/L (ref 10–44)
ANION GAP SERPL CALC-SCNC: 12 MMOL/L (ref 8–16)
AST SERPL-CCNC: 17 U/L (ref 10–40)
BASOPHILS # BLD AUTO: 0.01 K/UL (ref 0–0.2)
BASOPHILS NFR BLD: 0.2 % (ref 0–1.9)
BILIRUB SERPL-MCNC: 0.2 MG/DL (ref 0.1–1)
BUN SERPL-MCNC: 14 MG/DL (ref 6–20)
CALCIUM SERPL-MCNC: 9.5 MG/DL (ref 8.7–10.5)
CHLORIDE SERPL-SCNC: 99 MMOL/L (ref 95–110)
CO2 SERPL-SCNC: 27 MMOL/L (ref 23–29)
CREAT SERPL-MCNC: 0.6 MG/DL (ref 0.5–1.4)
DIFFERENTIAL METHOD: ABNORMAL
EOSINOPHIL # BLD AUTO: 0.4 K/UL (ref 0–0.5)
EOSINOPHIL NFR BLD: 7 % (ref 0–8)
ERYTHROCYTE [DISTWIDTH] IN BLOOD BY AUTOMATED COUNT: 18.8 % (ref 11.5–14.5)
EST. GFR  (NO RACE VARIABLE): >60 ML/MIN/1.73 M^2
GLUCOSE SERPL-MCNC: 100 MG/DL (ref 70–110)
HCT VFR BLD AUTO: 25.6 % (ref 40–54)
HGB BLD-MCNC: 7.7 G/DL (ref 14–18)
IMM GRANULOCYTES # BLD AUTO: 0.02 K/UL (ref 0–0.04)
IMM GRANULOCYTES NFR BLD AUTO: 0.3 % (ref 0–0.5)
LYMPHOCYTES # BLD AUTO: 1.2 K/UL (ref 1–4.8)
LYMPHOCYTES NFR BLD: 21.1 % (ref 18–48)
MAGNESIUM SERPL-MCNC: 2 MG/DL (ref 1.6–2.6)
MCH RBC QN AUTO: 26.1 PG (ref 27–31)
MCHC RBC AUTO-ENTMCNC: 30.1 G/DL (ref 32–36)
MCV RBC AUTO: 87 FL (ref 82–98)
MONOCYTES # BLD AUTO: 0.4 K/UL (ref 0.3–1)
MONOCYTES NFR BLD: 7.1 % (ref 4–15)
NEUTROPHILS # BLD AUTO: 3.7 K/UL (ref 1.8–7.7)
NEUTROPHILS NFR BLD: 64.3 % (ref 38–73)
NRBC BLD-RTO: 0 /100 WBC
PHOSPHATE SERPL-MCNC: 4.9 MG/DL (ref 2.7–4.5)
PLATELET # BLD AUTO: 332 K/UL (ref 150–450)
PMV BLD AUTO: 10.2 FL (ref 9.2–12.9)
POTASSIUM SERPL-SCNC: 4.1 MMOL/L (ref 3.5–5.1)
PROT SERPL-MCNC: 7 G/DL (ref 6–8.4)
RBC # BLD AUTO: 2.95 M/UL (ref 4.6–6.2)
SODIUM SERPL-SCNC: 138 MMOL/L (ref 136–145)
WBC # BLD AUTO: 5.74 K/UL (ref 3.9–12.7)

## 2023-11-08 PROCEDURE — 84100 ASSAY OF PHOSPHORUS: CPT

## 2023-11-08 PROCEDURE — 25000003 PHARM REV CODE 250: Performed by: STUDENT IN AN ORGANIZED HEALTH CARE EDUCATION/TRAINING PROGRAM

## 2023-11-08 PROCEDURE — 99231 PR SUBSEQUENT HOSPITAL CARE,LEVL I: ICD-10-PCS | Mod: ,,, | Performed by: ANESTHESIOLOGY

## 2023-11-08 PROCEDURE — 63600175 PHARM REV CODE 636 W HCPCS: Performed by: ANESTHESIOLOGY

## 2023-11-08 PROCEDURE — 25000242 PHARM REV CODE 250 ALT 637 W/ HCPCS: Performed by: STUDENT IN AN ORGANIZED HEALTH CARE EDUCATION/TRAINING PROGRAM

## 2023-11-08 PROCEDURE — 99233 SBSQ HOSP IP/OBS HIGH 50: CPT | Mod: ,,, | Performed by: HOSPITALIST

## 2023-11-08 PROCEDURE — 99233 PR SUBSEQUENT HOSPITAL CARE,LEVL III: ICD-10-PCS | Mod: ,,, | Performed by: HOSPITALIST

## 2023-11-08 PROCEDURE — 83735 ASSAY OF MAGNESIUM: CPT

## 2023-11-08 PROCEDURE — 94761 N-INVAS EAR/PLS OXIMETRY MLT: CPT

## 2023-11-08 PROCEDURE — 63600175 PHARM REV CODE 636 W HCPCS: Performed by: STUDENT IN AN ORGANIZED HEALTH CARE EDUCATION/TRAINING PROGRAM

## 2023-11-08 PROCEDURE — 25000003 PHARM REV CODE 250

## 2023-11-08 PROCEDURE — 94640 AIRWAY INHALATION TREATMENT: CPT

## 2023-11-08 PROCEDURE — 25000003 PHARM REV CODE 250: Performed by: INTERNAL MEDICINE

## 2023-11-08 PROCEDURE — 85025 COMPLETE CBC W/AUTO DIFF WBC: CPT

## 2023-11-08 PROCEDURE — 99231 SBSQ HOSP IP/OBS SF/LOW 25: CPT | Mod: ,,, | Performed by: ANESTHESIOLOGY

## 2023-11-08 PROCEDURE — 36415 COLL VENOUS BLD VENIPUNCTURE: CPT

## 2023-11-08 PROCEDURE — 27000221 HC OXYGEN, UP TO 24 HOURS

## 2023-11-08 PROCEDURE — 80053 COMPREHEN METABOLIC PANEL: CPT

## 2023-11-08 PROCEDURE — 99900035 HC TECH TIME PER 15 MIN (STAT)

## 2023-11-08 PROCEDURE — 20600001 HC STEP DOWN PRIVATE ROOM

## 2023-11-08 RX ORDER — OXYCODONE HYDROCHLORIDE 10 MG/1
10 TABLET ORAL EVERY 6 HOURS PRN
Status: DISCONTINUED | OUTPATIENT
Start: 2023-11-08 | End: 2023-11-10 | Stop reason: HOSPADM

## 2023-11-08 RX ORDER — POLYETHYLENE GLYCOL 3350 17 G/17G
17 POWDER, FOR SOLUTION ORAL DAILY
Status: DISCONTINUED | OUTPATIENT
Start: 2023-11-08 | End: 2023-11-10 | Stop reason: HOSPADM

## 2023-11-08 RX ORDER — HYDROMORPHONE HYDROCHLORIDE 1 MG/ML
0.5 INJECTION, SOLUTION INTRAMUSCULAR; INTRAVENOUS; SUBCUTANEOUS EVERY 6 HOURS PRN
Status: DISCONTINUED | OUTPATIENT
Start: 2023-11-08 | End: 2023-11-09

## 2023-11-08 RX ORDER — MORPHINE SULFATE 15 MG/1
15 TABLET, FILM COATED, EXTENDED RELEASE ORAL EVERY 12 HOURS
Status: DISCONTINUED | OUTPATIENT
Start: 2023-11-08 | End: 2023-11-09

## 2023-11-08 RX ORDER — OXYCODONE HYDROCHLORIDE 5 MG/1
5 TABLET ORAL EVERY 6 HOURS PRN
Status: DISCONTINUED | OUTPATIENT
Start: 2023-11-08 | End: 2023-11-10 | Stop reason: HOSPADM

## 2023-11-08 RX ORDER — BUPRENORPHINE AND NALOXONE 8; 2 MG/1; MG/1
1 FILM, SOLUBLE BUCCAL; SUBLINGUAL DAILY
Status: DISCONTINUED | OUTPATIENT
Start: 2023-11-08 | End: 2023-11-09

## 2023-11-08 RX ORDER — METHADONE HYDROCHLORIDE 5 MG/1
10 TABLET ORAL 2 TIMES DAILY
Status: DISCONTINUED | OUTPATIENT
Start: 2023-11-08 | End: 2023-11-08

## 2023-11-08 RX ORDER — HYDROMORPHONE HYDROCHLORIDE 2 MG/ML
2 INJECTION, SOLUTION INTRAMUSCULAR; INTRAVENOUS; SUBCUTANEOUS
Status: DISCONTINUED | OUTPATIENT
Start: 2023-11-08 | End: 2023-11-08

## 2023-11-08 RX ADMIN — CELECOXIB 200 MG: 100 CAPSULE ORAL at 09:11

## 2023-11-08 RX ADMIN — ACETAMINOPHEN 1000 MG: 500 TABLET ORAL at 02:11

## 2023-11-08 RX ADMIN — Medication 6 MG: at 09:11

## 2023-11-08 RX ADMIN — IPRATROPIUM BROMIDE AND ALBUTEROL SULFATE 3 ML: .5; 3 SOLUTION RESPIRATORY (INHALATION) at 09:11

## 2023-11-08 RX ADMIN — SENNOSIDES AND DOCUSATE SODIUM 2 TABLET: 8.6; 5 TABLET ORAL at 09:11

## 2023-11-08 RX ADMIN — METHOCARBAMOL 750 MG: 750 TABLET ORAL at 12:11

## 2023-11-08 RX ADMIN — PANTOPRAZOLE SODIUM 40 MG: 40 TABLET, DELAYED RELEASE ORAL at 09:11

## 2023-11-08 RX ADMIN — PANCRELIPASE 2 CAPSULE: 30000; 6000; 19000 CAPSULE, DELAYED RELEASE PELLETS ORAL at 09:11

## 2023-11-08 RX ADMIN — IPRATROPIUM BROMIDE AND ALBUTEROL SULFATE 3 ML: .5; 3 SOLUTION RESPIRATORY (INHALATION) at 07:11

## 2023-11-08 RX ADMIN — PANCRELIPASE 2 CAPSULE: 30000; 6000; 19000 CAPSULE, DELAYED RELEASE PELLETS ORAL at 12:11

## 2023-11-08 RX ADMIN — HYDROXYZINE PAMOATE 25 MG: 25 CAPSULE ORAL at 09:11

## 2023-11-08 RX ADMIN — FOLIC ACID 1 MG: 1 TABLET ORAL at 09:11

## 2023-11-08 RX ADMIN — MEROPENEM 1 G: 1 INJECTION INTRAVENOUS at 05:11

## 2023-11-08 RX ADMIN — ACETAMINOPHEN 1000 MG: 500 TABLET ORAL at 09:11

## 2023-11-08 RX ADMIN — PREGABALIN 150 MG: 150 CAPSULE ORAL at 09:11

## 2023-11-08 RX ADMIN — OXYCODONE HYDROCHLORIDE 10 MG: 10 TABLET ORAL at 04:11

## 2023-11-08 RX ADMIN — BUPRENORPHINE AND NALOXONE 1 FILM: 8; 2 FILM BUCCAL; SUBLINGUAL at 02:11

## 2023-11-08 RX ADMIN — METHOCARBAMOL 750 MG: 750 TABLET ORAL at 06:11

## 2023-11-08 RX ADMIN — MORPHINE SULFATE 15 MG: 15 TABLET, EXTENDED RELEASE ORAL at 09:11

## 2023-11-08 RX ADMIN — POLYETHYLENE GLYCOL 3350 17 G: 17 POWDER, FOR SOLUTION ORAL at 12:11

## 2023-11-08 RX ADMIN — DRONABINOL 10 MG: 5 CAPSULE ORAL at 09:11

## 2023-11-08 RX ADMIN — BICTEGRAVIR SODIUM, EMTRICITABINE, AND TENOFOVIR ALAFENAMIDE FUMARATE 1 TABLET: 50; 200; 25 TABLET ORAL at 09:11

## 2023-11-08 RX ADMIN — FLUCONAZOLE 400 MG: 200 TABLET ORAL at 09:11

## 2023-11-08 RX ADMIN — METHOCARBAMOL 750 MG: 750 TABLET ORAL at 05:11

## 2023-11-08 RX ADMIN — APIXABAN 5 MG: 5 TABLET, FILM COATED ORAL at 09:11

## 2023-11-08 RX ADMIN — HYDROMORPHONE HYDROCHLORIDE 0.5 MG: 1 INJECTION, SOLUTION INTRAMUSCULAR; INTRAVENOUS; SUBCUTANEOUS at 09:11

## 2023-11-08 RX ADMIN — PANCRELIPASE 2 CAPSULE: 30000; 6000; 19000 CAPSULE, DELAYED RELEASE PELLETS ORAL at 05:11

## 2023-11-08 RX ADMIN — METHOCARBAMOL 750 MG: 750 TABLET ORAL at 11:11

## 2023-11-08 RX ADMIN — MEROPENEM 1 G: 1 INJECTION INTRAVENOUS at 09:11

## 2023-11-08 RX ADMIN — MEROPENEM 1 G: 1 INJECTION INTRAVENOUS at 02:11

## 2023-11-08 RX ADMIN — IPRATROPIUM BROMIDE AND ALBUTEROL SULFATE 3 ML: .5; 3 SOLUTION RESPIRATORY (INHALATION) at 02:11

## 2023-11-08 NOTE — ASSESSMENT & PLAN NOTE
Admit with hemoglobin 10.2; Baseline ~ 10    Lab Results   Component Value Date    HGB 7.7 (L) 11/08/2023     - Daily CBC   - Transfuse hgb <7

## 2023-11-08 NOTE — SUBJECTIVE & OBJECTIVE
Interval History: NAEON. Patient reports continued and unchanged abdominal pain after change in his regimen. Currently on 1L NC, patient does not endorse and chest pain, SOB or increased work of breathing.    Review of Systems   Constitutional:  Negative for appetite change, chills, fatigue and fever.   HENT:  Negative for congestion and sore throat.    Respiratory:  Negative for cough, choking, chest tightness, shortness of breath and wheezing.    Cardiovascular:  Negative for chest pain and palpitations.   Gastrointestinal:  Positive for abdominal pain. Negative for constipation, diarrhea, nausea and vomiting.   Genitourinary:  Negative for dysuria and flank pain.   Musculoskeletal:  Positive for back pain. Negative for joint swelling.   Neurological:  Negative for dizziness and headaches.   Psychiatric/Behavioral:  Negative for confusion. The patient is not nervous/anxious.      Objective:     Vital Signs (Most Recent):  Temp: 97.9 °F (36.6 °C) (11/08/23 0815)  Pulse: 95 (11/08/23 0903)  Resp: 10 (11/08/23 0903)  BP: 116/77 (11/08/23 0815)  SpO2: 97 % (11/08/23 0903) Vital Signs (24h Range):  Temp:  [97.6 °F (36.4 °C)-98 °F (36.7 °C)] 97.9 °F (36.6 °C)  Pulse:  [] 95  Resp:  [9-21] 10  SpO2:  [95 %-100 %] 97 %  BP: (104-118)/(69-80) 116/77     Weight: 68 kg (149 lb 14.6 oz)  Body mass index is 21.51 kg/m².    Intake/Output Summary (Last 24 hours) at 11/8/2023 1118  Last data filed at 11/8/2023 0244  Gross per 24 hour   Intake 837 ml   Output 1725 ml   Net -888 ml         Physical Exam  Vitals and nursing note reviewed.   Constitutional:       General: He is not in acute distress.     Appearance: Normal appearance. He is not ill-appearing.   HENT:      Head: Normocephalic.      Nose: Nose normal.      Mouth/Throat:      Mouth: Mucous membranes are moist.      Pharynx: Oropharynx is clear.   Eyes:      General:         Right eye: No discharge.         Left eye: No discharge.      Extraocular Movements:  Extraocular movements intact.      Conjunctiva/sclera: Conjunctivae normal.   Cardiovascular:      Rate and Rhythm: Regular rhythm. Tachycardia present.      Heart sounds: No murmur heard.  Pulmonary:      Effort: Pulmonary effort is normal. No respiratory distress.      Breath sounds: Normal breath sounds. No wheezing, rhonchi or rales.   Abdominal:      General: Abdomen is flat. There is no distension.      Palpations: There is no mass.      Tenderness: There is abdominal tenderness. There is guarding (voluntary). There is no right CVA tenderness, left CVA tenderness or rebound.   Musculoskeletal:         General: Normal range of motion.      Cervical back: Normal range of motion.      Right lower leg: No edema.      Left lower leg: No edema.   Skin:     General: Skin is warm and dry.      Capillary Refill: Capillary refill takes less than 2 seconds.      Findings: No lesion.   Neurological:      General: No focal deficit present.      Mental Status: He is alert and oriented to person, place, and time.   Psychiatric:         Mood and Affect: Mood normal.         Thought Content: Thought content normal.             Significant Labs: All pertinent labs within the past 24 hours have been reviewed.    Significant Imaging: I have reviewed all pertinent imaging results/findings within the past 24 hours.

## 2023-11-08 NOTE — ASSESSMENT & PLAN NOTE
Patient found to have large pleural effusion on 11/3 CT scan, followed by CXR. Soon after, patient with new oxygen requirements and remains on 2-3L NC. Differentials for etiology include third spacing 2/2 low oncotic pressure, pancreaticopleural fistula following aspiration, among others. Slight improvement on repeat CXR (11/4 AM) after diuresis on 11/3. Urine output ~3L, though repeat CXR without significant improvement.      - Pulmonology evaluated patient for thoracentesis; patient refusing at this time  - Given several days of IV Lasix with improved aeration  - Monitor urine output  - Continue to wean oxygen as tolerated  - Should patient have worsening O2 requirements, repeat CXR warranted

## 2023-11-08 NOTE — ASSESSMENT & PLAN NOTE
Hx of EtOH use disorder. On most recent admission, patient's ethanol negative in setting of pancreatitis flare.   - Off CIWA precautions

## 2023-11-08 NOTE — ASSESSMENT & PLAN NOTE
Patient with extended course of high-potency opioids since being hospitalized multiple times for pancreatitis, starting on 9/17/23. KUB (10/27/23) notable for high-stool burden, though without focal dilatation, transition points, or free air. Patient with increased abdominal pain that is different in character and location from description of pain on admission, now including dull, achey pain in belt like distribution with intermittent nausea. Interval CT abdomen (10/30) with decreased stool burden following lactulose multiple times daily. Some bowel movements have not been recorded in the EMR.    - Continue bowel regimen of Senna BID  - Miralax x1 today  - Can add lactulose/enemas if needed

## 2023-11-08 NOTE — PROGRESS NOTES
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 He has been in and out of the hospital for the last two weeks. His most recent discharge was on10/19 after admission for recurrent symptoms of his acute necrotizing pancreatitis. He was discharged home on MSIR and Morphine which was working well until yesterday morning (10/21) when he started having increased abdominal pain w/nausea. He attempted to eat something but vomited. He denies any recent alcohol or tobacco use. He reports eating normally since discharge. Last meal was a grilled cheese yesterday for dinner. Last BM was loose and "oily" but normal color w/out blood present. He was evaluated by AES on his 10/5 admission who recommended against any intervention on the pancreatic collection due to immaturity. He has been on IV Cefepime since 10/5 with possible end date 11/2.     In the ED, he was afebrile and tachycardic to the 130s. Labs were notable for K 3.3, Bicarb 21, BUN 5, Alb 3.0, Lipase 96. Lactate WNL. Repeat CT A/P showed small R pleural effusion but no acute intra-abdominopelvic abnormality. Peripancreatic fluid collection at the pancreatic tail stable from previous imaging. He was given Dilaudid 2mg x1, Dilaudid 1mg x2 for pain and his evening dose of Cefepime. Hospital Medicine was called for admission of     Overview/Hospital Course:  Admitted to hospital medicine. Cefepime and MS contin continued, with addition of " "scheduled tylenol + toradol and then breakthrough dilaudid. AES consulted again now that imaging findings were stable >4w since initial diagnosis and persistent symptoms. Underwent EUS with findings of stable fluid collection which was not drained ("Complex collections along the neck, body, and tail of pancreas (body/tail region likely connected). Not suitable for endoscopic transmural stenting/drainage.") He will need follow-up CT scan in 4-6 weeks. Cefepime to complete 11/2/23. Patient with increasing pain and inadequate pain control despite dilaudid 2mg q4h w/ MS Contin 15mg bid and multimodal pain medication. A repeat CT Abd/Pelvis on 10/29 showed similar prior fluid collection with new fluid along the posterior aspect of the liver causing IVC compression. Patients case discussed with both IR and AES, both of whom do not feel he is a good candidate for drainage, either percutaneously/transhepatic/transpleural or endoscopically. ID consulted and antibiotics broadened to Meropenem + Diflucan on 10/30 after fevers on evening of 10/29. MS Contin increased to 30 mg BID. He underwent IR aspiration of his older collection on 11/2 w/ 7cc fluid aspirated. Fluid studies negative for infectious process at this time Repeat imaging with stable vs. Interval decrease in abdominal collections, though notable large R-sided pleural effusion. Course complicated by acute respiratory failure with effusion, possibly 2/2 SIRS/third spacing 2/2 hypoalbuminemia. Trial of diuresis yielding 3L urine output, though O2 requirements remain the same. Pulmonology consulted for pleural efffusion, patient refusing thoracentesis. PCA pump initiated after patient requiring 2mg dilaudid every 2H. Pain management team consulted for assistance, further management per Pain service.    Interval History: NAEON. Patient reports continued and unchanged abdominal pain after change in his regimen. Currently on 1L NC, patient does not endorse and chest pain, " SOB or increased work of breathing.    Review of Systems   Constitutional:  Negative for appetite change, chills, fatigue and fever.   HENT:  Negative for congestion and sore throat.    Respiratory:  Negative for cough, choking, chest tightness, shortness of breath and wheezing.    Cardiovascular:  Negative for chest pain and palpitations.   Gastrointestinal:  Positive for abdominal pain. Negative for constipation, diarrhea, nausea and vomiting.   Genitourinary:  Negative for dysuria and flank pain.   Musculoskeletal:  Positive for back pain. Negative for joint swelling.   Neurological:  Negative for dizziness and headaches.   Psychiatric/Behavioral:  Negative for confusion. The patient is not nervous/anxious.      Objective:     Vital Signs (Most Recent):  Temp: 97.9 °F (36.6 °C) (11/08/23 0815)  Pulse: 95 (11/08/23 0903)  Resp: 10 (11/08/23 0903)  BP: 116/77 (11/08/23 0815)  SpO2: 97 % (11/08/23 0903) Vital Signs (24h Range):  Temp:  [97.6 °F (36.4 °C)-98 °F (36.7 °C)] 97.9 °F (36.6 °C)  Pulse:  [] 95  Resp:  [9-21] 10  SpO2:  [95 %-100 %] 97 %  BP: (104-118)/(69-80) 116/77     Weight: 68 kg (149 lb 14.6 oz)  Body mass index is 21.51 kg/m².    Intake/Output Summary (Last 24 hours) at 11/8/2023 1118  Last data filed at 11/8/2023 0244  Gross per 24 hour   Intake 837 ml   Output 1725 ml   Net -888 ml         Physical Exam  Vitals and nursing note reviewed.   Constitutional:       General: He is not in acute distress.     Appearance: Normal appearance. He is not ill-appearing.   HENT:      Head: Normocephalic.      Nose: Nose normal.      Mouth/Throat:      Mouth: Mucous membranes are moist.      Pharynx: Oropharynx is clear.   Eyes:      General:         Right eye: No discharge.         Left eye: No discharge.      Extraocular Movements: Extraocular movements intact.      Conjunctiva/sclera: Conjunctivae normal.   Cardiovascular:      Rate and Rhythm: Regular rhythm. Tachycardia present.      Heart sounds: No  murmur heard.  Pulmonary:      Effort: Pulmonary effort is normal. No respiratory distress.      Breath sounds: Normal breath sounds. No wheezing, rhonchi or rales.   Abdominal:      General: Abdomen is flat. There is no distension.      Palpations: There is no mass.      Tenderness: There is abdominal tenderness. There is guarding (voluntary). There is no right CVA tenderness, left CVA tenderness or rebound.   Musculoskeletal:         General: Normal range of motion.      Cervical back: Normal range of motion.      Right lower leg: No edema.      Left lower leg: No edema.   Skin:     General: Skin is warm and dry.      Capillary Refill: Capillary refill takes less than 2 seconds.      Findings: No lesion.   Neurological:      General: No focal deficit present.      Mental Status: He is alert and oriented to person, place, and time.   Psychiatric:         Mood and Affect: Mood normal.         Thought Content: Thought content normal.             Significant Labs: All pertinent labs within the past 24 hours have been reviewed.    Significant Imaging: I have reviewed all pertinent imaging results/findings within the past 24 hours.    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 Lipase mildly elevated. WBC and lactate WNL. On IV Cefepime for 4wk course. Recent discharge on 10/19 with MSIR and Morphine for pain control. EUS (10/23/23) complex collections along the neck, body, and tail of pancreas; though not suitable for endoscopic transmural stenting/drainage. Repeat CT (10/29) due to worsening abdominal pain despite high opioid doses, showing new fluid collection pressing on IVC. Discussed with AES, who will not be able to drain new collection endoscopically, and IR, who does not  have a good sampling window and is reluctant to use transhepatic/transpleural methods. General surgery consulted though no plans for acute intervention. Abx broadened from cefepime to meropenem + diflucan after fevering on 10/29; ID consulted. IR reconsulted for drainage of anterior collection; s/p IR aspiration w/ 7 cc output on 11/2. Repeat abdominal CT (11/3) with interval decrease of collection sizes, though now with surrounding inflammatory changes, likely 2/2 recent aspiration procedure and new large right sided effusion. Fluid analysis from pancreatic aspirate remains without evidence of infection at this time.     - Continue to follow pancreatic fluid studies; NGTD  - continue creon  - Meropenem and Diflucan started (10/30 - 11/13) per ID recs  - Midline replaced 11/6   - Pain poorly controlled on PCA pump  - Pain management consulted, further management per PM service  - SW working on outpatient pain referrals    Acute hypoxemic respiratory failure  Patient found to have large pleural effusion on 11/3 CT scan, followed by CXR. Soon after, patient with new oxygen requirements and remains on 2-3L NC. Differentials for etiology include third spacing 2/2 low oncotic pressure, pancreaticopleural fistula following aspiration, among others. Slight improvement on repeat CXR (11/4 AM) after diuresis on 11/3. Urine output ~3L, though repeat CXR without significant improvement.      - Pulmonology evaluated patient for thoracentesis; patient refusing at this time  - Given several days of IV Lasix with improved aeration  - Monitor urine output  - Continue to wean oxygen as tolerated  - Should patient have worsening O2 requirements, repeat CXR warranted    Pleural effusion  See plan for 'Acute Hypoxemic Respiratory Failure'     Moderate malnutrition  Nutrition consulted. Most recent weight and BMI monitored-     Measurements:  Wt Readings from Last 1 Encounters:   10/26/23 68 kg (150 lb)   Body mass index is 21.52  "kg/m².    Patient has been screened and assessed by RD.    Malnutrition Type:  Context: chronic illness  Level:      Malnutrition Characteristic Summary:  Weight Loss (Malnutrition): 5% in 1 month  Energy Intake (Malnutrition): less than 75% for greater than or equal to 1 month    Interventions/Recommendations (treatment strategy):  1. When medically able, ADAT Regular diet  2. Add Boost Breeze TID. Modify to Boost Plus (vanilla) when diet advanced.  3. RD to monitor and follow      Necrotizing pancreatitis  Plan as above      Sepsis  This patient does have evidence of infective focus  My overall impression is sepsis.  Source: Abdominal  Antibiotics given-   Antibiotics (72h ago, onward)      Start     Stop Route Frequency Ordered    11/01/23 1000  meropenem (MERREM) 1 g in sodium chloride 0.9 % 100 mL IVPB (MB+)         11/13/23 0159 IV Every 8 hours (non-standard times) 11/01/23 0855          Latest lactate reviewed-  No results for input(s): "LACTATE" in the last 72 hours.      Fluid challenge Not needed - patient is not hypotensive      Post- resuscitation assessment No - Post resuscitation assessment not needed       Will Not start Pressors- Levophed for MAP of 65  Source control achieved by: Meropenem, Diflucan    Therapeutic opioid-induced constipation (OIC)  Patient with extended course of high-potency opioids since being hospitalized multiple times for pancreatitis, starting on 9/17/23. KUB (10/27/23) notable for high-stool burden, though without focal dilatation, transition points, or free air. Patient with increased abdominal pain that is different in character and location from description of pain on admission, now including dull, achey pain in belt like distribution with intermittent nausea. Interval CT abdomen (10/30) with decreased stool burden following lactulose multiple times daily. Some bowel movements have not been recorded in the EMR.    - Continue bowel regimen of Senna BID  - Miralax x1 " "today  - Can add lactulose/enemas if needed    Pancreatic pseudocyst/cyst  Plan as above    Hypokalemia  K 3.3 on admission; likely 2/2 to decreased PO intake    - Gave IV K 10meq Q1H for 6 doses   - Replete K PRN       Continuous severe abdominal pain  Likely 2/2 to pancreatitis; Discharged home on Morphine taper which was working well until yesterday morning    - See Chronic Pancreatitis       Alcohol use disorder  Hx of EtOH use disorder. On most recent admission, patient's ethanol negative in setting of pancreatitis flare.   - Off CIWA precautions      Fluid collection of pancreas  Plan as above        Splenic vein thrombosis  Splenic vein thrombus originally seen on 9/20 imaging, and persistent on most recent CT abdominal imaging. Started on DOAC at last admission, loading dose completed.     - Continue eliquis 5 mg BID        Mild intermittent asthma without complication  History of asthma requiring PRN albuterol. Has not recently been using any inhaler treatment.     Normocytic anemia  Admit with hemoglobin 10.2; Baseline ~ 10    Lab Results   Component Value Date    HGB 7.7 (L) 11/08/2023     - Daily CBC   - Transfuse hgb <7       Polycythemia vera  Hgb 10.5 on admission. Follows up with hematology as outpatient.   - continue to monitor daily CBC      HIV infection  This patient in known to have HIV+ status (Have detected HIV PCR but never CD4 <200 or AIDS defining illness). Labs reviewed- No results found for: "CD4", No results found for: "HIVDNAPCR". Patient is on HAART. Will continue HAART. Continue to monitor routine labs. Last CD4 count was   Lab Results   Component Value Date    ABSOLUTECD4 1080 10/12/2023     - Continue home Biktarvy         VTE Risk Mitigation (From admission, onward)           Ordered     apixaban tablet 5 mg  2 times daily         10/24/23 0744                  Discharge Planning   CASTILLO: 11/10/2023     Code Status: Full Code   Is the patient medically ready for discharge?: No    " Reason for patient still in hospital (select all that apply): Treatment  Discharge Plan A: Home with family   Discharge Delays: (!) Procedure Scheduling (IR, OR, Labs, Echo, Cath, Echo, EEG)      Krystal Adam MD  Department of Hospital Medicine   [facility] - Intensive Care ([place]-)

## 2023-11-08 NOTE — RESPIRATORY THERAPY
"RAPID RESPONSE RESPIRATORY THERAPY ETCO2 CHECK         Time of visit:      Code Status: Full Code   : 2000  Bed: 93115/76072 A:   MRN: [id number]  Time spent at the bedside: < 15 min    SITUATION    Evaluated patient for: ETCo2 compliance    BACKGROUND    Why is the patient in the hospital?: Chronic pancreatitis    Patient has a past medical history of Acute necrotizing pancreatitis, Alcohol use disorder, Asthma, Hepatitis C antibody positive in blood, HIV infection, Corinne-Chauhan tear, Normocytic anemia, Pancreatic pseudocyst/cyst, Polycythemia vera, Positive CMV IgG serology, and Splenic vein thrombosis.    24 Hours Vitals Range:  Temp:  [98.2 °F (36.8 °C)-98.7 °F (37.1 °C)]   Pulse:  []   Resp:  [11-21]   BP: (107-127)/(71-82)   SpO2:  [91 %-99 %]     Labs:    Recent Labs     23  0522 23  0254 23  0239    135* 138   K 3.7 3.6 3.9   CL 99 97 98   CO2 29 27 29   BUN 5* 7 11   CREATININE 0.6 0.6 0.6    104 106   PHOS 4.3 4.2 4.8*   MG 1.9 1.8 1.9        No results for input(s): "PH", "PCO2", "PO2", "HCO3", "POCSATURATED", "BE" in the last 72 hours.    ASSESSMENT/INTERVENTIONS      Last VS   Temp: 98.7 °F (37.1 °C) (420)  Pulse: 113 (1758)  Resp: 16 (1758)  BP: 107/74 ( 1555)  SpO2: 98 % (1758)    Level of Consciousness: Level of Consciousness (AVPU): alert  Respiratory Effort: Respiratory Effort: Normal, Unlabored Expansion/Accessory Muscle Usage: Expansion/Accessory Muscles/Retractions: no use of accessory muscles, no retractions, expansion symmetric  All Lung Field Breath Sounds: All Lung Fields Breath Sounds: Anterior:, Lateral:, diminished, clear  SUSAN Breath Sounds: clear  LLL Breath Sounds: diminished  RUL Breath Sounds: clear  RLL Breath Sounds: diminished  Is the ETCO2 monitor on? Yes  Is the patient wearing a cannula? Yes  Are ETCO2 orders placed? Yes  Is the patient on a PCA pump? Yes  ETCO2 monitored: ETCO2 (mmHg): 46 mmHg  Ambu at " bedside:      Active Orders   Respiratory Care    END TIDAL CO2 MONITOR Q12H     Frequency: Q12H     Number of Occurrences: Until Specified    Inhalation Treatment Q6H WAKE     Frequency: Q6H WAKE     Number of Occurrences: Until Specified    Oxygen Continuous     Frequency: Continuous     Number of Occurrences: Until Specified     Order Questions:      Device type: Low flow      Device: Nasal Cannula-Humidified      Titrate O2 per Oxygen Titration Protocol: Yes      To maintain SpO2 goal of: >= 90%      Notify MD of: Inability to achieve desired SpO2; Sudden change in patient status and requires 20% increase in FiO2; Patient requires >60% FiO2    Oxygen PRN     Frequency: PRN     Number of Occurrences: Until Specified     Order Questions:      Device type: Low flow      Device: Nasal Cannula (1- 5 Liters)      LPM: 2      Titrate O2 per Oxygen Titration Protocol: Yes      To maintain SpO2 goal of: >= 92%      Notify MD of: Inability to achieve desired SpO2; Sudden change in patient status and requires 20% increase in FiO2; Patient requires >60% FiO2    Pulse Oximetry Q Shift     Frequency: Q Shift     Number of Occurrences: Until Specified    Pulse Oximetry Q4H     Frequency: Q4H     Number of Occurrences: Until Specified       RECOMMENDATIONS    We recommend: RRT Recs: Continue POC per primary team.      FOLLOW-UP    Please call back the Rapid Response RTCoy RRT at x [phone] for any questions or concerns.

## 2023-11-08 NOTE — NURSING
Patient resting in bed with no new complaints.  PCA adjusted as ordered, patient tolerated well.  IV lasix given with 800ml output.  Call light in reach.

## 2023-11-08 NOTE — ASSESSMENT & PLAN NOTE
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 Lipase mildly elevated. WBC and lactate WNL. On IV Cefepime for 4wk course. Recent discharge on 10/19 with MSIR and Morphine for pain control. EUS (10/23/23) complex collections along the neck, body, and tail of pancreas; though not suitable for endoscopic transmural stenting/drainage. Repeat CT (10/29) due to worsening abdominal pain despite high opioid doses, showing new fluid collection pressing on IVC. Discussed with AES, who will not be able to drain new collection endoscopically, and IR, who does not have a good sampling window and is reluctant to use transhepatic/transpleural methods. General surgery consulted though no plans for acute intervention. Abx broadened from cefepime to meropenem + diflucan after fevering on 10/29; ID consulted. IR reconsulted for drainage of anterior collection; s/p IR aspiration w/ 7 cc output on 11/2. Repeat abdominal CT (11/3) with interval decrease of collection sizes, though now with surrounding inflammatory changes, likely 2/2 recent aspiration procedure and new large right sided effusion. Fluid analysis from pancreatic aspirate remains without evidence of infection at this time.     - Continue to follow pancreatic fluid studies; NGTD  - continue creon  - Meropenem and Diflucan started (10/30 - 11/13) per ID recs  - Midline replaced 11/6   - Pain poorly controlled on PCA pump  - Pain management consulted, further management per PM service  - SW working on outpatient pain referrals

## 2023-11-08 NOTE — PLAN OF CARE
Problem: Nausea and Vomiting  Goal: Fluid and Electrolyte Balance  Outcome: Ongoing, Progressing     Problem: Fluid Imbalance (Pancreatitis)  Goal: Fluid Balance  Outcome: Ongoing, Progressing     Problem: Infection (Pancreatitis)  Goal: Infection Symptom Resolution  Outcome: Ongoing, Progressing     Problem: Nutrition Impaired (Pancreatitis)  Goal: Optimal Nutrition Intake  Outcome: Ongoing, Progressing     Problem: Respiratory Compromise (Pancreatitis)  Goal: Effective Oxygenation and Ventilation  Outcome: Ongoing, Progressing     Problem: Adjustment to Illness (Sepsis/Septic Shock)  Goal: Optimal Coping  Outcome: Ongoing, Progressing     Problem: Bleeding (Sepsis/Septic Shock)  Goal: Absence of Bleeding  Outcome: Ongoing, Progressing     Problem: Glycemic Control Impaired (Sepsis/Septic Shock)  Goal: Blood Glucose Level Within Desired Range  Outcome: Ongoing, Progressing     Problem: Infection Progression (Sepsis/Septic Shock)  Goal: Absence of Infection Signs and Symptoms  Outcome: Ongoing, Progressing     Problem: Fall Injury Risk  Goal: Absence of Fall and Fall-Related Injury  Outcome: Ongoing, Progressing     Problem: Pain (Pancreatitis)  Goal: Acceptable Pain Control  Outcome: Ongoing, Not Progressing     Problem: Nutrition Impaired (Sepsis/Septic Shock)  Goal: Optimal Nutrition Intake  Outcome: Ongoing, Not Progressing

## 2023-11-08 NOTE — PLAN OF CARE
PING notified by medical team that they would like this patient to follow with a pain management clinic that can accept his Medicaid insurance. [facility] identified [facility] which accepts his plan. Medical team input referral order. PETR Ro sent the referral to schedule his appointment.    Jessica Frankel, LCSW

## 2023-11-08 NOTE — ANESTHESIA POST-OP PAIN MANAGEMENT
Consult  Anesthesiology Acute Pain Service      SUBJECTIVE:     Chief Complaint/Reason for Consult: Request anesthesia assistance with continuous pain management due to high complexity/refractory pain.    History of Present Illness:  Patient is a 23 y.o. male with a PMH of etoh-induced necrotizing pancreatitis and HIV who was admitted for intractable abdominal pain and vomiting. Pain began 4 months ago, and he has been in and out of the hospital for the past 2 months. Pain is described as stabbing pain along the RUQ and lower abdomen, nonradiating.    Anesthesiology is consulted for assistance with pain management     Patient Active Problem List   Diagnosis    HIV infection    Polycythemia vera    Normocytic anemia    Mild intermittent asthma without complication    Chronic pancreatitis    Splenic vein thrombosis    Positive CMV IgG serology    Fluid collection of pancreas    Alcohol use disorder    Continuous severe abdominal pain    Hypokalemia    Pancreatic pseudocyst/cyst    Therapeutic opioid-induced constipation (OIC)    Sepsis    Necrotizing pancreatitis    Moderate malnutrition    Pleural effusion    Acute hypoxemic respiratory failure       Review of patient's allergies indicates:   Allergen Reactions    Milo Swelling    Pcn [penicillins] Hives     Tolerates cephalosporins    Pecan nut Swelling    Sulfa (sulfonamide antibiotics) Hives       Outpatient Medications:   No current facility-administered medications on file prior to encounter.     Current Outpatient Medications on File Prior to Encounter   Medication Sig Dispense Refill    apixaban (ELIQUIS) 5 mg Tab Take 1 tablet (5 mg total) by mouth 2 (two) times daily. 60 tablet 0    whoprtlyv-cvsygvtl-zrehylz ala (BIKTARVY) -25 mg (25 kg or greater) Take 1 tablet by mouth once daily.      dextrose 5 % in water (D5W) PgBk 100 mL with ceFEPIme 2 gram SolR 2 g Inject 2 g into the vein every 8 (eight) hours.      morphine (MS CONTIN) 15 MG 12 hr tablet  Take 1 tablet (15 mg total) by mouth 2 (two) times daily for 14 days, THEN 1 tablet (15 mg total) once daily for 14 days. 42 tablet 0    morphine (MSIR) 15 MG tablet Take 1 tablet (15 mg total) by mouth every 6 (six) hours as needed for Pain (severe, breakthrough). 42 tablet 0    promethazine (PHENERGAN) 25 MG tablet Take 25 mg by mouth every 4 (four) hours as needed for Nausea.          Current Inpatient Medications:   acetaminophen  1,000 mg Oral Q8H    albuterol-ipratropium  3 mL Nebulization Q6H WAKE    apixaban  5 mg Oral BID    wbnkxamtd-hsjiwzgm-eamernw ala  1 tablet Oral Daily    celecoxib  200 mg Oral Daily    droNABinol  10 mg Oral Q12H    fluconazole  400 mg Oral Daily    folic acid  1 mg Oral Daily    lipase-protease-amylase 6,000-19,000-30,000 units  2 capsule Oral TID WM    meropenem (MERREM) IVPB  1 g Intravenous Q8H    methadone  10 mg Oral BID    methocarbamoL  750 mg Oral Q6H    morphine  15 mg Oral Q12H    pantoprazole  40 mg Oral BID    polyethylene glycol  17 g Oral Daily    pregabalin  150 mg Oral QHS    senna-docusate 8.6-50 mg  2 tablet Oral BID       PRN:  dextrose 10%, dextrose 10%, glucagon (human recombinant), glucose, glucose, hydrOXYzine pamoate, iohexol, melatonin, ondansetron, oxyCODONE, oxyCODONE, oxymetazoline, prochlorperazine, sodium chloride 0.9%, sodium chloride 0.9%    Past Surgical History:   Procedure Laterality Date    ENDOSCOPIC ULTRASOUND OF UPPER GASTROINTESTINAL TRACT N/A 10/23/2023    Procedure: ULTRASOUND, UPPER GI TRACT, ENDOSCOPIC;  Surgeon: Emil Villatoro MD;  Location: [address]);  Service: Endoscopy;  Laterality: N/A;    ERCP N/A 10/23/2023    Procedure: ERCP (ENDOSCOPIC RETROGRADE CHOLANGIOPANCREATOGRAPHY);  Surgeon: Emil Villatoro MD;  Location: [address]);  Service: Endoscopy;  Laterality: N/A;    ESOPHAGOGASTRODUODENOSCOPY N/A 9/18/2023    Procedure: EGD (ESOPHAGOGASTRODUODENOSCOPY);  Surgeon: Kg Cleaning MD;  Location: [facility] ([facility]  Service: Endoscopy;  Laterality: N/A;       Social History     Socioeconomic History    Marital status: Single   Tobacco Use    Smoking status: Former     Types: Cigarettes    Smokeless tobacco: Never   Substance and Sexual Activity    Alcohol use: Not Currently    Drug use: Never     Social Determinants of Health     Financial Resource Strain: Low Risk  (10/23/2023)    Overall Financial Resource Strain (CARDIA)     Difficulty of Paying Living Expenses: Not very hard   Food Insecurity: No Food Insecurity (10/23/2023)    Hunger Vital Sign     Worried About Running Out of Food in the Last Year: Never true     Ran Out of Food in the Last Year: Never true   Transportation Needs: No Transportation Needs (10/23/2023)    PRAPARE - Transportation     Lack of Transportation (Medical): No     Lack of Transportation (Non-Medical): No   Physical Activity: Inactive (9/18/2023)    Exercise Vital Sign     Days of Exercise per Week: 0 days     Minutes of Exercise per Session: 0 min   Stress: Stress Concern Present (9/18/2023)    [employer] [place] [employer] - Occupational Stress Questionnaire     Feeling of Stress : To some extent   Social Connections: Socially Isolated (10/23/2023)    Social Connection and Isolation Panel [NHANES]     Frequency of Communication with Friends and Family: Three times a week     Frequency of Social Gatherings with Friends and Family: Three times a week     Attends Taoist Services: Never     Active Member of Clubs or Organizations: No     Attends Club or Organization Meetings: Never     Marital Status: Never    Housing Stability: Low Risk  (10/23/2023)    Housing Stability Vital Sign     Unable to Pay for Housing in the Last Year: No     Number of Places Lived in the Last Year: 1     Unstable Housing in the Last Year: No       Review of Systems:  As above. Additionally:     Constitutional: Negative for fever, diaphoresis, activity change, appetite change and fatigue.    HENT: Negative for congestion, postnasal drip, rhinorrhea, sinus pressure, sore throat, trouble swallowing and voice change.    Eyes: Negative for photophobia, pain and visual disturbance.   Respiratory: Negative for cough, shortness of breath and wheezing.    Cardiovascular: Negative for chest pain, palpitations and leg swelling.   Gastrointestinal: Negative for diarrhea, constipation and abdominal distention.   Endocrine: Negative for polydipsia, polyphagia and polyuria.   Genitourinary: Negative for dysuria, frequency, hematuria and difficulty urinating.   Musculoskeletal: Negative for back pain, arthralgias, neck pain and neck stiffness.   Skin: Negative for color change, pallor, rash and wound.   Neurological: Negative for dizziness, seizures, syncope, weakness and headaches.   Hematological: Negative for adenopathy. Does not bruise/bleed easily.   Psychiatric/Behavioral: Negative for sleep disturbance and dysphoric mood. The patient is not nervous/anxious.      OBJECTIVE:     Current Vital Signs  Temp: 36.7 °C (98 °F) (11/08/23 1216)  Pulse: 102 (11/08/23 1216)  Resp: 18 (11/08/23 1216)  BP: 119/75 (11/08/23 1216)  SpO2: 95 % (11/08/23 1216)    Vital Signs Range (Last 24H):  Temp:  [36.4 °C (97.6 °F)-36.7 °C (98 °F)]   Pulse:  []   Resp:  [9-19]   BP: (104-119)/(69-80)   SpO2:  [95 %-100 %]     I & O (Last 24H):  Intake/Output Summary (Last 24 hours) at 11/8/2023 1302  Last data filed at 11/8/2023 0244  Gross per 24 hour   Intake 837 ml   Output 1725 ml   Net -888 ml         Physical Exam:  Constitutional: Patient is oriented to person, place, and time. Patient appears well-developed and well-nourished. No distress.   HENT: Head: Normocephalic and atraumatic. Right Ear: External ear normal. Left Ear: External ear normal. Nose: Nose normal.   Mouth/Throat: Oropharynx is clear and moist. No oropharyngeal exudate.   Eyes: Conjunctivae and EOM are normal. Pupils are equal, round, and reactive to light.  Right eye exhibits no discharge. Left eye exhibits no discharge. No scleral icterus.   Neck: Normal range of motion. Neck supple. No JVD present. No tracheal deviation present. No thyromegaly present.   Cardiovascular: Normal rate, regular rhythm, normal heart sounds and intact distal pulses.  Exam reveals no gallop and no friction rub.  No murmur heard.  Pulmonary/Chest: Effort normal and breath sounds normal. No respiratory distress. Patient has no wheezes. Patient has no rales.   Abdominal: Soft. Bowel sounds are normal. Patient exhibits no distension and no mass. RUQ tenderness without rebound with guarding.  Musculoskeletal: Normal range of motion. Patient exhibits no edema or tenderness.   Lymphadenopathy:   Patient has no cervical adenopathy.   Neurological: Patient is alert and oriented to person, place, and time. Patient has normal reflexes. Patient exhibits normal muscle tone. Coordination normal.   Skin: Skin is warm and dry. No rash noted. Patient is not diaphoretic. No erythema. No pallor.   Psychiatric: Patient has a normal mood and affect. Behavior is normal. Judgment and thought content normal.     Laboratory:  CBC:   Recent Labs     11/07/23 0239 11/08/23  0340   WBC 5.28 5.74   RBC 2.85* 2.95*   HGB 7.6* 7.7*   HCT 25.0* 25.6*    332   MCV 88 87   MCH 26.7* 26.1*   MCHC 30.4* 30.1*         CMP:   Recent Labs     11/07/23 0239 11/08/23  0340    138   K 3.9 4.1   CO2 29 27   CL 98 99   BUN 11 14   CREATININE 0.6 0.6    100   MG 1.9 2.0   PHOS 4.8* 4.9*   CALCIUM 9.3 9.5   ALBUMIN 2.9* 2.8*   PROT 7.1 7.0   ALKPHOS 66 87   ALT 9* 10   AST 17 17   BILITOT 0.2 0.2         Diagnostic Radiology:  11/3/23 CT abd/pel  Impression:     In this patient with necrotizing pancreatitis there is redemonstration of the multilocular fluid collection in the upper abdomen as well as including the pancreatic body and tail.  Given previous findings of necrotizing pancreatitis, these collections may  represent walled-off pancreatic necrosis given time frame.     There has been interval aspiration of the dominant collection in the mid abdomen since the prior examination with development of foci of gas within this collection, likely related to the procedure.  This collection as well as few of the other collections (the previously mentioned collection posterior to the liver as well as the collection along the greater curvature of the stomach) have decreased in size from prior with similar appearance of the collections involving the pancreatic body/tail.  However, there is worsened surrounding inflammatory change and free fluid in the abdomen with intraperitoneal foci of gas.     Worsened large volume right pleural effusion with associated right lower lobe atelectasis and similar left lower lobe small volume pleural effusion with associated atelectasis.    ASSESSMENT/PLAN:     Active Hospital Problems    Diagnosis  POA    *Chronic pancreatitis [K86.1]  Yes    Acute hypoxemic respiratory failure [J96.01]  Yes    Pleural effusion [J90]  Yes    Necrotizing pancreatitis [K85.91]  Yes    Moderate malnutrition [E44.0]  Yes    Sepsis [A41.9]  No     10/29/23 vitals      Therapeutic opioid-induced constipation (OIC) [K59.03, T40.2X5A]  No    Pancreatic pseudocyst/cyst [K86.2, K86.3]  Yes    Alcohol use disorder [F10.90]  Yes    Fluid collection of pancreas [K86.89]  Yes    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       Plan:    22 y/o M with necrotizing pancreatitis presents with intractable abdominal pain resistant to high dose opioids on PCA. Patient was not on home opioids prior to 2 months ago. Concern for chronic pain given his hyperalgesia and elevated opioid requirements. We will aim to wean opioids and recommend a chronic pain evaluation for  medical management and a celiac plexus block. Unfortunately, [facility] does not accept medicaid insurance. Chronic pain providers who may take medicaid are the following: Dr. Ro ([facility]), [facility], Dr. Villalpando, and Dr. Alicea. Would appreciate if case management would assist in arranging an appointment.    Recommend multimodal pain regimen as follows (all ordered):     D/c PCA, start SL suboxone 8-2mg daily, will uptitrate tomorrow   Continue MS contin per primary team  PO tylenol 1g q8h  PO celebrex 200mg daily  PO robaxin 750mg q6h  PO dronabinol 5mg daily  PO lyrica 150mg qhs  PRN oxycodone 5mg/10mg for moderate to severe pain  IV dilaudid 0.5mg q6h for breakthrough pain not relieved with oxycodone     Thank you for this consult.  We will continue to follow.     Jennifer Carter MD  [id number]  [facility]

## 2023-11-09 VITALS
BODY MASS INDEX: 21.47 KG/M2 | TEMPERATURE: 99 F | DIASTOLIC BLOOD PRESSURE: 74 MMHG | OXYGEN SATURATION: 91 % | RESPIRATION RATE: 9 BRPM | HEART RATE: 89 BPM | SYSTOLIC BLOOD PRESSURE: 112 MMHG | WEIGHT: 149.94 LBS | HEIGHT: 70 IN

## 2023-11-09 LAB
ALBUMIN SERPL BCP-MCNC: 2.7 G/DL (ref 3.5–5.2)
ALP SERPL-CCNC: 83 U/L (ref 55–135)
ALT SERPL W/O P-5'-P-CCNC: 9 U/L (ref 10–44)
ANION GAP SERPL CALC-SCNC: 11 MMOL/L (ref 8–16)
AST SERPL-CCNC: 14 U/L (ref 10–40)
BASOPHILS # BLD AUTO: 0.02 K/UL (ref 0–0.2)
BASOPHILS NFR BLD: 0.3 % (ref 0–1.9)
BILIRUB SERPL-MCNC: 0.2 MG/DL (ref 0.1–1)
BUN SERPL-MCNC: 15 MG/DL (ref 6–20)
CALCIUM SERPL-MCNC: 9.8 MG/DL (ref 8.7–10.5)
CHLORIDE SERPL-SCNC: 103 MMOL/L (ref 95–110)
CO2 SERPL-SCNC: 24 MMOL/L (ref 23–29)
CREAT SERPL-MCNC: 0.7 MG/DL (ref 0.5–1.4)
DIFFERENTIAL METHOD: ABNORMAL
EOSINOPHIL # BLD AUTO: 0.5 K/UL (ref 0–0.5)
EOSINOPHIL NFR BLD: 7 % (ref 0–8)
ERYTHROCYTE [DISTWIDTH] IN BLOOD BY AUTOMATED COUNT: 19.4 % (ref 11.5–14.5)
EST. GFR  (NO RACE VARIABLE): >60 ML/MIN/1.73 M^2
GLUCOSE SERPL-MCNC: 87 MG/DL (ref 70–110)
HCT VFR BLD AUTO: 26.4 % (ref 40–54)
HGB BLD-MCNC: 8.1 G/DL (ref 14–18)
HYPOCHROMIA BLD QL SMEAR: ABNORMAL
IMM GRANULOCYTES # BLD AUTO: 0.01 K/UL (ref 0–0.04)
IMM GRANULOCYTES NFR BLD AUTO: 0.1 % (ref 0–0.5)
LYMPHOCYTES # BLD AUTO: 2 K/UL (ref 1–4.8)
LYMPHOCYTES NFR BLD: 28.8 % (ref 18–48)
MAGNESIUM SERPL-MCNC: 2 MG/DL (ref 1.6–2.6)
MCH RBC QN AUTO: 26.5 PG (ref 27–31)
MCHC RBC AUTO-ENTMCNC: 30.7 G/DL (ref 32–36)
MCV RBC AUTO: 86 FL (ref 82–98)
MONOCYTES # BLD AUTO: 0.5 K/UL (ref 0.3–1)
MONOCYTES NFR BLD: 7.4 % (ref 4–15)
NEUTROPHILS # BLD AUTO: 3.9 K/UL (ref 1.8–7.7)
NEUTROPHILS NFR BLD: 56.4 % (ref 38–73)
NRBC BLD-RTO: 0 /100 WBC
PHOSPHATE SERPL-MCNC: 5.2 MG/DL (ref 2.7–4.5)
PLATELET # BLD AUTO: 363 K/UL (ref 150–450)
PLATELET BLD QL SMEAR: ABNORMAL
PMV BLD AUTO: 10.4 FL (ref 9.2–12.9)
POTASSIUM SERPL-SCNC: 4.5 MMOL/L (ref 3.5–5.1)
PROT SERPL-MCNC: 6.8 G/DL (ref 6–8.4)
RBC # BLD AUTO: 3.06 M/UL (ref 4.6–6.2)
SODIUM SERPL-SCNC: 138 MMOL/L (ref 136–145)
WBC # BLD AUTO: 6.98 K/UL (ref 3.9–12.7)

## 2023-11-09 PROCEDURE — 63600175 PHARM REV CODE 636 W HCPCS: Performed by: STUDENT IN AN ORGANIZED HEALTH CARE EDUCATION/TRAINING PROGRAM

## 2023-11-09 PROCEDURE — 25000003 PHARM REV CODE 250: Performed by: STUDENT IN AN ORGANIZED HEALTH CARE EDUCATION/TRAINING PROGRAM

## 2023-11-09 PROCEDURE — 36415 COLL VENOUS BLD VENIPUNCTURE: CPT

## 2023-11-09 PROCEDURE — 99900035 HC TECH TIME PER 15 MIN (STAT)

## 2023-11-09 PROCEDURE — 83735 ASSAY OF MAGNESIUM: CPT

## 2023-11-09 PROCEDURE — 25000003 PHARM REV CODE 250: Performed by: INTERNAL MEDICINE

## 2023-11-09 PROCEDURE — 99238 HOSP IP/OBS DSCHRG MGMT 30/<: CPT | Mod: ,,, | Performed by: HOSPITALIST

## 2023-11-09 PROCEDURE — 99238 PR HOSPITAL DISCHARGE DAY,<30 MIN: ICD-10-PCS | Mod: ,,, | Performed by: HOSPITALIST

## 2023-11-09 PROCEDURE — 25000003 PHARM REV CODE 250

## 2023-11-09 PROCEDURE — 63600175 PHARM REV CODE 636 W HCPCS: Performed by: ANESTHESIOLOGY

## 2023-11-09 PROCEDURE — 99231 PR SUBSEQUENT HOSPITAL CARE,LEVL I: ICD-10-PCS | Mod: ,,, | Performed by: ANESTHESIOLOGY

## 2023-11-09 PROCEDURE — 85025 COMPLETE CBC W/AUTO DIFF WBC: CPT

## 2023-11-09 PROCEDURE — 80053 COMPREHEN METABOLIC PANEL: CPT

## 2023-11-09 PROCEDURE — 94761 N-INVAS EAR/PLS OXIMETRY MLT: CPT

## 2023-11-09 PROCEDURE — 63600175 PHARM REV CODE 636 W HCPCS

## 2023-11-09 PROCEDURE — 25000242 PHARM REV CODE 250 ALT 637 W/ HCPCS: Performed by: STUDENT IN AN ORGANIZED HEALTH CARE EDUCATION/TRAINING PROGRAM

## 2023-11-09 PROCEDURE — 94640 AIRWAY INHALATION TREATMENT: CPT

## 2023-11-09 PROCEDURE — 99231 SBSQ HOSP IP/OBS SF/LOW 25: CPT | Mod: ,,, | Performed by: ANESTHESIOLOGY

## 2023-11-09 PROCEDURE — 84100 ASSAY OF PHOSPHORUS: CPT

## 2023-11-09 RX ORDER — FLUCONAZOLE 200 MG/1
400 TABLET ORAL DAILY
Qty: 4 TABLET | Refills: 0 | Status: SHIPPED | OUTPATIENT
Start: 2023-11-10 | End: 2023-11-12

## 2023-11-09 RX ORDER — METHOCARBAMOL 750 MG/1
750 TABLET, FILM COATED ORAL EVERY 6 HOURS
Qty: 28 TABLET | Refills: 0 | Status: SHIPPED | OUTPATIENT
Start: 2023-11-09 | End: 2023-11-16

## 2023-11-09 RX ORDER — PREGABALIN 150 MG/1
150 CAPSULE ORAL NIGHTLY
Qty: 7 CAPSULE | Refills: 0 | Status: SHIPPED | OUTPATIENT
Start: 2023-11-09 | End: 2023-11-09 | Stop reason: HOSPADM

## 2023-11-09 RX ORDER — HYDROXYZINE PAMOATE 25 MG/1
25 CAPSULE ORAL EVERY 8 HOURS PRN
Qty: 21 CAPSULE | Refills: 0 | Status: SHIPPED | OUTPATIENT
Start: 2023-11-09 | End: 2023-11-09 | Stop reason: HOSPADM

## 2023-11-09 RX ORDER — HYDROMORPHONE HYDROCHLORIDE 2 MG/1
2 TABLET ORAL EVERY 6 HOURS PRN
Refills: 0 | Status: ON HOLD
Start: 2023-11-09 | End: 2023-11-15 | Stop reason: HOSPADM

## 2023-11-09 RX ORDER — DRONABINOL 10 MG/1
10 CAPSULE ORAL EVERY 12 HOURS
Qty: 14 CAPSULE | Refills: 0 | Status: SHIPPED | OUTPATIENT
Start: 2023-11-09 | End: 2023-11-09 | Stop reason: HOSPADM

## 2023-11-09 RX ORDER — BUPRENORPHINE AND NALOXONE 8; 2 MG/1; MG/1
2 FILM, SOLUBLE BUCCAL; SUBLINGUAL DAILY
Status: DISCONTINUED | OUTPATIENT
Start: 2023-11-09 | End: 2023-11-10 | Stop reason: HOSPADM

## 2023-11-09 RX ORDER — OXYCODONE HYDROCHLORIDE 10 MG/1
10 TABLET ORAL EVERY 6 HOURS PRN
Qty: 28 TABLET | Refills: 0 | Status: SHIPPED | OUTPATIENT
Start: 2023-11-09 | End: 2023-11-09 | Stop reason: HOSPADM

## 2023-11-09 RX ORDER — AMOXICILLIN 250 MG
2 CAPSULE ORAL 2 TIMES DAILY
Qty: 120 TABLET | Refills: 0 | Status: SHIPPED | OUTPATIENT
Start: 2023-11-09 | End: 2023-11-09 | Stop reason: HOSPADM

## 2023-11-09 RX ORDER — CELECOXIB 200 MG/1
200 CAPSULE ORAL DAILY
Qty: 7 CAPSULE | Refills: 0 | Status: SHIPPED | OUTPATIENT
Start: 2023-11-10 | End: 2023-11-09 | Stop reason: HOSPADM

## 2023-11-09 RX ORDER — PANTOPRAZOLE SODIUM 40 MG/1
40 TABLET, DELAYED RELEASE ORAL 2 TIMES DAILY
Qty: 180 TABLET | Refills: 0 | Status: SHIPPED | OUTPATIENT
Start: 2023-11-09 | End: 2024-02-07

## 2023-11-09 RX ORDER — FOLIC ACID 1 MG/1
1 TABLET ORAL DAILY
Qty: 90 TABLET | Refills: 0 | Status: SHIPPED | OUTPATIENT
Start: 2023-11-09 | End: 2024-02-07

## 2023-11-09 RX ORDER — NALOXONE HYDROCHLORIDE 4 MG/.1ML
1 SPRAY NASAL ONCE
Qty: 2 EACH | Refills: 0 | Status: SHIPPED | OUTPATIENT
Start: 2023-11-09 | End: 2023-11-09 | Stop reason: HOSPADM

## 2023-11-09 RX ORDER — BUPRENORPHINE AND NALOXONE 8; 2 MG/1; MG/1
2 FILM, SOLUBLE BUCCAL; SUBLINGUAL DAILY
Qty: 14 FILM | Refills: 0 | Status: ON HOLD | OUTPATIENT
Start: 2023-11-10 | End: 2023-11-15 | Stop reason: SDUPTHER

## 2023-11-09 RX ADMIN — APIXABAN 5 MG: 5 TABLET, FILM COATED ORAL at 09:11

## 2023-11-09 RX ADMIN — FLUCONAZOLE 400 MG: 200 TABLET ORAL at 09:11

## 2023-11-09 RX ADMIN — DRONABINOL 10 MG: 5 CAPSULE ORAL at 09:11

## 2023-11-09 RX ADMIN — MEROPENEM 1 G: 1 INJECTION INTRAVENOUS at 10:11

## 2023-11-09 RX ADMIN — ONDANSETRON 4 MG: 2 INJECTION INTRAMUSCULAR; INTRAVENOUS at 10:11

## 2023-11-09 RX ADMIN — IPRATROPIUM BROMIDE AND ALBUTEROL SULFATE 3 ML: .5; 3 SOLUTION RESPIRATORY (INHALATION) at 01:11

## 2023-11-09 RX ADMIN — PANCRELIPASE 2 CAPSULE: 30000; 6000; 19000 CAPSULE, DELAYED RELEASE PELLETS ORAL at 01:11

## 2023-11-09 RX ADMIN — SENNOSIDES AND DOCUSATE SODIUM 2 TABLET: 8.6; 5 TABLET ORAL at 09:11

## 2023-11-09 RX ADMIN — PANCRELIPASE 2 CAPSULE: 30000; 6000; 19000 CAPSULE, DELAYED RELEASE PELLETS ORAL at 09:11

## 2023-11-09 RX ADMIN — PANCRELIPASE 2 CAPSULE: 30000; 6000; 19000 CAPSULE, DELAYED RELEASE PELLETS ORAL at 05:11

## 2023-11-09 RX ADMIN — FOLIC ACID 1 MG: 1 TABLET ORAL at 09:11

## 2023-11-09 RX ADMIN — METHOCARBAMOL 750 MG: 750 TABLET ORAL at 05:11

## 2023-11-09 RX ADMIN — CELECOXIB 200 MG: 100 CAPSULE ORAL at 09:11

## 2023-11-09 RX ADMIN — MORPHINE SULFATE 15 MG: 15 TABLET, EXTENDED RELEASE ORAL at 09:11

## 2023-11-09 RX ADMIN — BUPRENORPHINE AND NALOXONE 2 FILM: 8; 2 FILM BUCCAL; SUBLINGUAL at 09:11

## 2023-11-09 RX ADMIN — ACETAMINOPHEN 1000 MG: 500 TABLET ORAL at 05:11

## 2023-11-09 RX ADMIN — MEROPENEM 1 G: 1 INJECTION INTRAVENOUS at 05:11

## 2023-11-09 RX ADMIN — MEROPENEM 1 G: 1 INJECTION INTRAVENOUS at 02:11

## 2023-11-09 RX ADMIN — IPRATROPIUM BROMIDE AND ALBUTEROL SULFATE 3 ML: .5; 3 SOLUTION RESPIRATORY (INHALATION) at 08:11

## 2023-11-09 RX ADMIN — PANTOPRAZOLE SODIUM 40 MG: 40 TABLET, DELAYED RELEASE ORAL at 09:11

## 2023-11-09 RX ADMIN — METHOCARBAMOL 750 MG: 750 TABLET ORAL at 12:11

## 2023-11-09 NOTE — PLAN OF CARE
11/09/23 1302   Post-Acute Status   Post-Acute Authorization IV Infusion   IV Infusion Status Referral(s) sent   Other Status Awaiting f/u Appts  (referal faxed to [facility]. They will contact the patient to schedule in a couple of days. SW notified patient and medical team.)   Discharge Delays None known at this time   Discharge Plan   Discharge Plan A Home       SW sent referral to [facility] to resume home infusions and contacted Gisele to inform him. SW notified patient that referrals have been sent. Patient in agreement.    Jessica Frankel, LCSW

## 2023-11-09 NOTE — ANESTHESIA POST-OP PAIN MANAGEMENT
Consult  Anesthesiology Acute Pain Service      SUBJECTIVE:     Chief Complaint/Reason for Consult: Request anesthesia assistance with continuous pain management due to high complexity/refractory pain.    History of Present Illness:  Patient is a 23 y.o. male with a PMH of etoh-induced necrotizing pancreatitis and HIV who was admitted for intractable abdominal pain and vomiting. Pain began 4 months ago, and he has been in and out of the hospital for the past 2 months. Pain is described as stabbing pain along the RUQ and lower abdomen, nonradiating.    Anesthesiology is consulted for assistance with pain management     Patient Active Problem List   Diagnosis    HIV infection    Polycythemia vera    Normocytic anemia    Mild intermittent asthma without complication    Chronic pancreatitis    Splenic vein thrombosis    Positive CMV IgG serology    Fluid collection of pancreas    Alcohol use disorder    Continuous severe abdominal pain    Hypokalemia    Pancreatic pseudocyst/cyst    Therapeutic opioid-induced constipation (OIC)    Sepsis    Necrotizing pancreatitis    Moderate malnutrition    Pleural effusion    Acute hypoxemic respiratory failure       Review of patient's allergies indicates:   Allergen Reactions    [place]tair Swelling    Pcn [penicillins] Hives     Tolerates cephalosporins    Pecan nut Swelling    Sulfa (sulfonamide antibiotics) Hives       Outpatient Medications:   No current facility-administered medications on file prior to encounter.     Current Outpatient Medications on File Prior to Encounter   Medication Sig Dispense Refill    apixaban (ELIQUIS) 5 mg Tab Take 1 tablet (5 mg total) by mouth 2 (two) times daily. 60 tablet 0    matxjgirq-qkmydecj-gmfhweg ala (BIKTARVY) -25 mg (25 kg or greater) Take 1 tablet by mouth once daily.      dextrose 5 % in water (D5W) PgBk 100 mL with ceFEPIme 2 gram SolR 2 g Inject 2 g into the vein every 8 (eight) hours.      morphine (MS CONTIN) 15 MG 12 hr tablet  Take 1 tablet (15 mg total) by mouth 2 (two) times daily for 14 days, THEN 1 tablet (15 mg total) once daily for 14 days. 42 tablet 0    morphine (MSIR) 15 MG tablet Take 1 tablet (15 mg total) by mouth every 6 (six) hours as needed for Pain (severe, breakthrough). 42 tablet 0    promethazine (PHENERGAN) 25 MG tablet Take 25 mg by mouth every 4 (four) hours as needed for Nausea.          Current Inpatient Medications:   acetaminophen  1,000 mg Oral Q8H    albuterol-ipratropium  3 mL Nebulization Q6H WAKE    apixaban  5 mg Oral BID    poihwivni-mlrrpwqe-hlwqnpn ala  1 tablet Oral Daily    buprenorphine-naloxone 8-2 mg  1 Film Sublingual Daily    buprenorphine-naloxone 8-2 mg  2 Film Sublingual Daily    celecoxib  200 mg Oral Daily    droNABinol  10 mg Oral Q12H    fluconazole  400 mg Oral Daily    folic acid  1 mg Oral Daily    lipase-protease-amylase 6,000-19,000-30,000 units  2 capsule Oral TID WM    meropenem (MERREM) IVPB  1 g Intravenous Q8H    methocarbamoL  750 mg Oral Q6H    morphine  15 mg Oral Q12H    pantoprazole  40 mg Oral BID    polyethylene glycol  17 g Oral Daily    pregabalin  150 mg Oral QHS    senna-docusate 8.6-50 mg  2 tablet Oral BID       PRN:  dextrose 10%, dextrose 10%, glucagon (human recombinant), glucose, glucose, hydrOXYzine pamoate, melatonin, ondansetron, oxyCODONE, oxyCODONE, prochlorperazine, sodium chloride 0.9%, sodium chloride 0.9%    Past Surgical History:   Procedure Laterality Date    ENDOSCOPIC ULTRASOUND OF UPPER GASTROINTESTINAL TRACT N/A 10/23/2023    Procedure: ULTRASOUND, UPPER GI TRACT, ENDOSCOPIC;  Surgeon: Emil Villatoro MD;  Location: [facility] ([address] [place] [address]);  Service: Endoscopy;  Laterality: N/A;    ERCP N/A 10/23/2023    Procedure: ERCP (ENDOSCOPIC RETROGRADE CHOLANGIOPANCREATOGRAPHY);  Surgeon: Emil Villatoro MD;  [facility]  Service: Endoscopy;  Laterality: N/A;    ESOPHAGOGASTRODUODENOSCOPY N/A 9/18/2023    Procedure: EGD  (ESOPHAGOGASTRODUODENOSCOPY);  Surgeon: Kg Cleaning MD;  Location: [facility] ([address] [place] [address]);  Service: Endoscopy;  Laterality: N/A;       Social History     Socioeconomic History    Marital status: Single   Tobacco Use    Smoking status: Former     Types: Cigarettes    Smokeless tobacco: Never   Substance and Sexual Activity    Alcohol use: Not Currently    Drug use: Never     Social Determinants of Health     Financial Resource Strain: Low Risk  (10/23/2023)    Overall Financial Resource Strain (CARDIA)     Difficulty of Paying Living Expenses: Not very hard   Food Insecurity: No Food Insecurity (10/23/2023)    Hunger Vital Sign     Worried About Running Out of Food in the Last Year: Never true     Ran Out of Food in the Last Year: Never true   Transportation Needs: No Transportation Needs (10/23/2023)    PRAPARE - Transportation     Lack of Transportation (Medical): No     Lack of Transportation (Non-Medical): No   Physical Activity: Inactive (9/18/2023)    Exercise Vital Sign     Days of Exercise per Week: 0 days     Minutes of Exercise per Session: 0 min   Stress: Stress Concern Present (9/18/2023)    [employer] [place] [employer] - Occupational Stress Questionnaire     Feeling of Stress : To some extent   Social Connections: Socially Isolated (10/23/2023)    Social Connection and Isolation Panel [NHANES]     Frequency of Communication with Friends and Family: Three times a week     Frequency of Social Gatherings with Friends and Family: Three times a week     Attends [employer] Services: Never     Active Member of Clubs or Organizations: No     Attends Club or Organization Meetings: Never     Marital Status: Never    Housing Stability: Low Risk  (10/23/2023)    Housing Stability Vital Sign     Unable to Pay for Housing in the Last Year: No     Number of Places Lived in the Last Year: 1     Unstable Housing in the Last Year: No       Review of Systems:  As above. Additionally:     Constitutional:  Negative for fever, diaphoresis, activity change, appetite change and fatigue.   HENT: Negative for congestion, postnasal drip, rhinorrhea, sinus pressure, sore throat, trouble swallowing and voice change.    Eyes: Negative for photophobia, pain and visual disturbance.   Respiratory: Negative for cough, shortness of breath and wheezing.    Cardiovascular: Negative for chest pain, palpitations and leg swelling.   Gastrointestinal: Negative for diarrhea, constipation and abdominal distention.   Endocrine: Negative for polydipsia, polyphagia and polyuria.   Genitourinary: Negative for dysuria, frequency, hematuria and difficulty urinating.   Musculoskeletal: Negative for back pain, arthralgias, neck pain and neck stiffness.   Skin: Negative for color change, pallor, rash and wound.   Neurological: Negative for dizziness, seizures, syncope, weakness and headaches.   Hematological: Negative for adenopathy. Does not bruise/bleed easily.   Psychiatric/Behavioral: Negative for sleep disturbance and dysphoric mood. The patient is not nervous/anxious.      OBJECTIVE:     Current Vital Signs  Temp: 36.9 °C (98.4 °F) (11/09/23 0800)  Pulse: 82 (11/09/23 0827)  Resp: 18 (11/09/23 0911)  BP: 111/65 (11/09/23 0800)  SpO2: (!) 93 % (11/09/23 0827)    Vital Signs Range (Last 24H):  Temp:  [35.7 °C (96.3 °F)-36.9 °C (98.5 °F)]   Pulse:  []   Resp:  [12-20]   BP: (108-119)/(65-75)   SpO2:  [88 %-96 %]     I & O (Last 24H):No intake or output data in the 24 hours ending 11/09/23 1048      Physical Exam:  Constitutional: Patient is oriented to person, place, and time. Patient appears well-developed and well-nourished. No distress.   HENT: Head: Normocephalic and atraumatic. Right Ear: External ear normal. Left Ear: External ear normal. Nose: Nose normal.   Mouth/Throat: Oropharynx is clear and moist. No oropharyngeal exudate.   Eyes: Conjunctivae and EOM are normal. Pupils are equal, round, and reactive to light. Right eye  exhibits no discharge. Left eye exhibits no discharge. No scleral icterus.   Neck: Normal range of motion. Neck supple. No JVD present. No tracheal deviation present. No thyromegaly present.   Cardiovascular: Normal rate, regular rhythm, normal heart sounds and intact distal pulses.  Exam reveals no gallop and no friction rub.  No murmur heard.  Pulmonary/Chest: Effort normal and breath sounds normal. No respiratory distress. Patient has no wheezes. Patient has no rales.   Abdominal: Soft. Bowel sounds are normal. Patient exhibits no distension and no mass. RUQ tenderness without rebound with guarding.  Musculoskeletal: Normal range of motion. Patient exhibits no edema or tenderness.   Lymphadenopathy:   Patient has no cervical adenopathy.   Neurological: Patient is alert and oriented to person, place, and time. Patient has normal reflexes. Patient exhibits normal muscle tone. Coordination normal.   Skin: Skin is warm and dry. No rash noted. Patient is not diaphoretic. No erythema. No pallor.   Psychiatric: Patient has a normal mood and affect. Behavior is normal. Judgment and thought content normal.     Laboratory:  CBC:   Recent Labs     11/08/23  0340 11/09/23 0317   WBC 5.74 6.98   RBC 2.95* 3.06*   HGB 7.7* 8.1*   HCT 25.6* 26.4*    363   MCV 87 86   MCH 26.1* 26.5*   MCHC 30.1* 30.7*         CMP:   Recent Labs     11/08/23  0340 11/09/23 0317    138   K 4.1 4.5   CO2 27 24   CL 99 103   BUN 14 15   CREATININE 0.6 0.7    87   MG 2.0 2.0   PHOS 4.9* 5.2*   CALCIUM 9.5 9.8   ALBUMIN 2.8* 2.7*   PROT 7.0 6.8   ALKPHOS 87 83   ALT 10 9*   AST 17 14   BILITOT 0.2 0.2         Diagnostic Radiology:  11/3/23 CT abd/pel  Impression:     In this patient with necrotizing pancreatitis there is redemonstration of the multilocular fluid collection in the upper abdomen as well as including the pancreatic body and tail.  Given previous findings of necrotizing pancreatitis, these collections may represent  walled-off pancreatic necrosis given time frame.     There has been interval aspiration of the dominant collection in the mid abdomen since the prior examination with development of foci of gas within this collection, likely related to the procedure.  This collection as well as few of the other collections (the previously mentioned collection posterior to the liver as well as the collection along the greater curvature of the stomach) have decreased in size from prior with similar appearance of the collections involving the pancreatic body/tail.  However, there is worsened surrounding inflammatory change and free fluid in the abdomen with intraperitoneal foci of gas.     Worsened large volume right pleural effusion with associated right lower lobe atelectasis and similar left lower lobe small volume pleural effusion with associated atelectasis.    ASSESSMENT/PLAN:     Active Hospital Problems    Diagnosis  POA    *Chronic pancreatitis [K86.1]  Yes    Acute hypoxemic respiratory failure [J96.01]  Yes    Pleural effusion [J90]  Yes    Necrotizing pancreatitis [K85.91]  Yes    Moderate malnutrition [E44.0]  Yes    Sepsis [A41.9]  No     10/29/23 vitals      Therapeutic opioid-induced constipation (OIC) [K59.03, T40.2X5A]  No    Pancreatic pseudocyst/cyst [K86.2, K86.3]  Yes    Alcohol use disorder [F10.90]  Yes    Fluid collection of pancreas [K86.89]  Yes    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       Plan:    24 y/o M with necrotizing pancreatitis presents with intractable abdominal pain resistant to high dose opioids on PCA. Patient was not on home opioids prior to 2 months ago. Concern for chronic pain given his hyperalgesia and elevated opioid requirements. We will aim to wean opioids and recommend a chronic pain evaluation for medical  management and a celiac plexus block. Unfortunately, [facility] does not accept medicaid insurance. Chronic pain providers who may take medicaid are the following: Dr. Ro ([facility]), [facility], Dr. Villalpando, and Dr. Alicea. Would appreciate if case management would assist in arranging an appointment.    Recommend multimodal pain regimen as follows (all ordered):     Uptitrate suboxone to 16  D/c dilaudid  Continue MS contin per primary team  PO tylenol 1g q8h  PO celebrex 200mg daily  PO robaxin 750mg q6h  PO dronabinol 5mg daily  PO lyrica 150mg qhs  PRN oxycodone 5mg/10mg for moderate to severe pain    Thank you for this consult.  We will sign off. Please re-consult for further issues.     Jennifer Carter MD  PGY4  [facility]

## 2023-11-09 NOTE — PLAN OF CARE
[facility] ([facility])  Discharge Final Note    Primary Care Provider: Pina Jones NP    Expected Discharge Date: 11/9/2023    Final Discharge Note (most recent)       Final Note - 11/09/23 1522          Final Note    Assessment Type Final Discharge Note (P)      Anticipated Discharge Disposition IV Therapy Provider (P)         Post-Acute Status    Post-Acute Authorization IV Infusion (P)      IV Infusion Status Set-up Complete/Auth obtained (P)      Discharge Delays None known at this time (P)                      Important Message from Medicare             Contact Info       Pina Jones NP   Specialty: Family Medicine   Relationship: PCP - General    [address] [facility] [address]   Phone: [phone]       Next Steps: Follow up in 1 week(s)    [facility] [address]   Specialty: Gastroenterology    [address]  [facility] [address]   Phone: [phone]       Next Steps: Follow up in 1 month(s)            PING notified that this patient's insurance lapsed at the end of October. PING notified [facility] to meet with this patient. PING discussed discharge plan with pharmacist and MD Resident. PING discussed with Gisele at [facility]. From [facility] cost if $113 for the required meds. PING, MD, and Pharmacist met with this patient to discuss discharging and paying out of pocket for essential meds. [facility] rep also arrived during this meeting and explained Medicaid eligibility to this patient. Due to his income, he is over the limit to qualify for Medicaid. Patient agreed to pay out of pocket for the recommended medications from hospital pharmacy and [facility] and to discharge home today ( total expected to be around $300). PING educated patient about financial aid. PING notified Gisele and [facility] will deliver iv meds and supplies to home.     Jessica Frankel, LCSW

## 2023-11-09 NOTE — PLAN OF CARE
SW placed referral to [facility] due to high risk for readmission.    Jessica Frankel, MATTIW

## 2023-11-09 NOTE — DISCHARGE INSTRUCTIONS
Please follow-up with repeat CT abdomen and gastroenterology in 1 month.  You have labs due next week including a CBC, CMP, CRP.   Please take it easy over the next couple of days due to your oxygen and pleural effusion  Avoid alcohol

## 2023-11-09 NOTE — NURSING
PCA pump discontinued later in the day after rounded by anesthesia.  PRNs given for pain.  No new complaints at this time.  Call light in reach.

## 2023-11-09 NOTE — PLAN OF CARE
[facility] ([place] [address])      HOME HEALTH ORDERS  FACE TO FACE ENCOUNTER    Patient Name: Tasia Cardona  YOB: 2000    PCP: Pina Jones NP   PCP Address: [address] / [place] [address]  PCP Phone Number: [phone]  PCP Fax: [phone]    Encounter Date: 10/21/23    Home Infusions    Diagnoses:  Active Hospital Problems    Diagnosis  POA    *Chronic pancreatitis [K86.1]  Yes    Acute hypoxemic respiratory failure [J96.01]  Yes    Pleural effusion [J90]  Yes    Necrotizing pancreatitis [K85.91]  Yes    Moderate malnutrition [E44.0]  Yes    Sepsis [A41.9]  No     10/29/23 vitals      Therapeutic opioid-induced constipation (OIC) [K59.03, T40.2X5A]  No    Pancreatic pseudocyst/cyst [K86.2, K86.3]  Yes    Alcohol use disorder [F10.90]  Yes    Fluid collection of pancreas [K86.89]  Yes    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       Follow Up Appointments:  Future Appointments   Date Time Provider Department Center   11/13/2023  4:30 PM Roxy Daniels MD [facility]       Allergies:  Review of patient's allergies indicates:   Allergen Reactions    [place] Swelling    Pcn [penicillins] Hives     Tolerates cephalosporins    Pecan nut Swelling    Sulfa (sulfonamide antibiotics) Hives       Medications: Review discharge medications with patient and family and provide education.    Current Facility-Administered Medications   Medication Dose Route Frequency Provider Last Rate Last Admin    acetaminophen tablet 1,000 mg  1,000 mg Oral Q8H Jennifer Carter MD   1,000 mg at 11/09/23 0519    albuterol-ipratropium 2.5 mg-0.5 mg/3 mL nebulizer solution 3 mL  3 mL Nebulization Q6H WAKE Anabelle Rosas DO   3 mL at 11/09/23 0827    apixaban tablet 5 mg  5 mg Oral BID Anabelle Rosas DO   5 mg  at 11/09/23 0912    jgrlfigxm-jagbsozj-vqmqvqe ala -25 mg (25 kg or greater) 1 tablet  1 tablet Oral Daily Stephanie Garcia MD   1 tablet at 11/08/23 0933    buprenorphine-naloxone 8-2 mg SL film 2 Film  2 Film Sublingual Daily Jennifer Carter MD   2 Film at 11/09/23 0952    celecoxib capsule 200 mg  200 mg Oral Daily Jennifer Carter MD   200 mg at 11/09/23 0911    dextrose 10% bolus 125 mL 125 mL  12.5 g Intravenous PRN Stephanie Garcia MD        dextrose 10% bolus 250 mL 250 mL  25 g Intravenous PRN Stephanie Garcia MD        droNABinol capsule 10 mg  10 mg Oral Q12H Angelina Mata MD   10 mg at 11/09/23 0912    fluconazole tablet 400 mg  400 mg Oral Daily Anabelle Rosas DO   400 mg at 11/09/23 0913    folic acid tablet 1 mg  1 mg Oral Daily Stephanie Garcia MD   1 mg at 11/09/23 0912    glucagon (human recombinant) injection 1 mg  1 mg Intramuscular PRN Stephanie Garcia MD        glucose chewable tablet 16 g  16 g Oral PRN Stephanie Garcia MD        glucose chewable tablet 24 g  24 g Oral PRN Stephanie Garcia MD        hydrOXYzine pamoate capsule 25 mg  25 mg Oral Q8H PRN Noah Trinh MD   25 mg at 11/08/23 2136    lipase-protease-amylase 6,000-19,000-30,000 units per capsule 2 capsule  2 capsule Oral TID  Srini Mtz MD   2 capsule at 11/09/23 0950    melatonin tablet 6 mg  6 mg Oral Nightly PRN Stephanie Garcia MD   6 mg at 11/08/23 2136    meropenem (MERREM) 1 g in sodium chloride 0.9 % 100 mL IVPB (MB+)  1 g Intravenous Q8H Anabelle Rosas DO   Stopped at 11/09/23 1136    methocarbamoL tablet 750 mg  750 mg Oral Q6H Jennifer Carter MD   750 mg at 11/09/23 0519    ondansetron injection 4 mg  4 mg Intravenous Q8H PRN Stephanie Garcia MD   4 mg at 11/09/23 1034    oxyCODONE immediate release tablet 5 mg  5 mg Oral Q6H PRN Jennifer Carter MD        oxyCODONE immediate release tablet Tab 10 mg  10 mg Oral Q6H PRN Jennifer Carter MD   10 mg at 11/08/23 1600    pantoprazole EC tablet 40 mg  40  mg Oral BID Stephanie Garcia MD   40 mg at 11/09/23 0912    polyethylene glycol packet 17 g  17 g Oral Daily Krystal Adam MD   17 g at 11/08/23 1207    pregabalin capsule 150 mg  150 mg Oral QHS Jennifer Carter MD   150 mg at 11/08/23 2136    prochlorperazine injection Soln 5 mg  5 mg Intravenous Q6H PRN Krystal Adam MD   5 mg at 11/06/23 1534    senna-docusate 8.6-50 mg per tablet 2 tablet  2 tablet Oral BID Krystal Adam MD   2 tablet at 11/09/23 0912    sodium chloride 0.9% flush 10 mL  10 mL Intravenous Q12H PRN Stephanie Garcia MD        sodium chloride 0.9% flush 10 mL  10 mL Intravenous PRN Stephanie Garcia MD         Current Discharge Medication List        START taking these medications    Details   buprenorphine-naloxone 8-2 mg (SUBOXONE) 8-2 mg Place 2 Film under the tongue once daily. for 7 days  Qty: 14 Film, Refills: 0      celecoxib (CELEBREX) 200 MG capsule Take 1 capsule (200 mg total) by mouth once daily. for 7 days  Qty: 7 capsule, Refills: 0      droNABinol (MARINOL) 10 MG capsule Take 1 capsule (10 mg total) by mouth every 12 (twelve) hours. for 7 days  Qty: 14 capsule, Refills: 0      fluconazole (DIFLUCAN) 200 MG Tab Take 2 tablets (400 mg total) by mouth once daily. for 2 days  Qty: 4 tablet, Refills: 0      folic acid (FOLVITE) 1 MG tablet Take 1 tablet (1 mg total) by mouth once daily.  Qty: 90 tablet, Refills: 0      hydrOXYzine pamoate (VISTARIL) 25 MG Cap Take 1 capsule (25 mg total) by mouth every 8 (eight) hours as needed.  Qty: 21 capsule, Refills: 0      lipase-protease-amylase 6,000-19,000-30,000 units (CREON 6000) 6,000-19,000 -30,000 unit capsule Take 2 capsules by mouth 3 (three) times daily with meals.  Qty: 180 capsule, Refills: 11      methocarbamoL (ROBAXIN) 750 MG Tab Take 1 tablet (750 mg total) by mouth every 6 (six) hours. for 7 days  Qty: 28 tablet, Refills: 0      pantoprazole (PROTONIX) 40 MG tablet Take 1 tablet (40 mg total) by mouth 2 (two) times  daily.  Qty: 180 tablet, Refills: 0      pregabalin (LYRICA) 150 MG capsule Take 1 capsule (150 mg total) by mouth every evening. for 7 days  Qty: 7 capsule, Refills: 0      senna-docusate 8.6-50 mg (PERICOLACE) 8.6-50 mg per tablet Take 2 tablets by mouth 2 (two) times daily.  Qty: 120 tablet, Refills: 0      sodium chloride 0.9 % PgBk 100 mL with meropenem 1 gram SolR 1 g Inject 1 g into the vein every 8 (eight) hours. for 3 days           CONTINUE these medications which have NOT CHANGED    Details   apixaban (ELIQUIS) 5 mg Tab Take 1 tablet (5 mg total) by mouth 2 (two) times daily.  Qty: 60 tablet, Refills: 0      uznifxteb-oavnkwrx-pslxfzh ala (BIKTARVY) -25 mg (25 kg or greater) Take 1 tablet by mouth once daily.    Associated Diagnoses: Asymptomatic HIV infection, with no history of HIV-related illness      promethazine (PHENERGAN) 25 MG tablet Take 25 mg by mouth every 4 (four) hours as needed for Nausea.           STOP taking these medications       dextrose 5 % in water (D5W) PgBk 100 mL with ceFEPIme 2 gram SolR 2 g Comments:   Reason for Stopping:         morphine (MS CONTIN) 15 MG 12 hr tablet Comments:   Reason for Stopping:         morphine (MSIR) 15 MG tablet Comments:   Reason for Stopping:               Diet:   regular diet    Labs:  Will need CBC/CMPCRP Monday 11/13/23    Activities:   activity as tolerated    Nursing:   Agency to admit patient within 24 hours of hospital discharge unless specified on physician order or at patient request    SN to complete comprehensive assessment including routine vital signs. Instruct on disease process and s/s of complications to report to MD. Review/verify medication list sent home with the patient at time of discharge  and instruct patient/caregiver as needed. Frequency may be adjusted depending on start of care date.     Skilled nurse to perform up to 3 visits PRN for symptoms related to diagnosis    Notify MD if SBP > 160 or < 90; DBP > 90 or < 50;  HR > 120 or < 50; Temp > 101; O2 < 88%; Other:       Ok to schedule additional visits based on staff availability and patient request on consecutive days within the home health episode.    When multiple disciplines ordered:    Start of Care occurs on Sunday - Wednesday schedule remaining discipline evaluations as ordered on separate consecutive days following the start of care.    Thursday SOC -schedule subsequent evaluations Friday and Monday the following week.     Friday - Saturday SOC - schedule subsequent discipline evaluations on consecutive days starting Monday of the following week.    For all post-discharge communication and subsequent orders please contact patient's primary care physician. If unable to reach primary care physician or do not receive response within 30 minutes, please contact [facility] for clinical staff order clarification    Miscellaneous   Home Infusion Therapy:   SN to perform Infusion Therapy/Central Line Care.  Review Central Line Care & Central Line Flush with patient.    Administer (drug and dose):Meropenem 1 gram IV q8 hours    Last dose given: 11/9/23                         Home dose due: 11/9/23    Scrub the Hub: Prior to accessing the line, always perform a 30 second alcohol scrub  Each lumen of the central line is to be flushed at least daily with 10 mL Normal Saline and 3 mL Heparin flush (10 units/mL)  Skilled Nurse (SN) may draw blood from IV access  Blood Draw Procedure:   - Aspirate at least 5 mL of blood   - Discard   - Obtain specimen   - Change injection cap   - Flush with 20 mL Normal Saline followed by a                 3-5 mL Heparin flush (10 units/mL)  Central :   - Sterile dressing changes are done weekly and as needed.   - Use chlor-hexadine scrub to cleanse site, apply Biopatch to insertion site,       apply securement device dressing   - Injection caps are changed weekly and after EVERY lab draw.   - If sterile gauze is under dressing to control  oozing,                 dressing change must be performed every 24 hours until gauze is not needed.        I certify that this patient is confined to his home and needs home infusions.

## 2023-11-09 NOTE — DISCHARGE SUMMARY
"[facility] ([place] [address])  [facility] Medicine  Discharge Summary      Patient Name: Tasia Cardona  MRN: [id number]  HEATHER: [id number]  Patient Class: IP- Inpatient  Admission Date: 10/21/2023  Hospital Length of Stay: 18 days  Discharge Date and Time:  11/09/2023 1:27 PM  Attending Physician: Osmin Dutta MD   Discharging Provider: Anabelle Rosas DO  Primary Care Provider: Pina Jones NP  Hospital Medicine Team: [facility] HOSP MED 4 Anabelle Rosas DO  Primary Care Team: [facility] HOSP MED 4    HPI:   24yo M w/PMHx of HIV (on Biktarvy, last CD4 296 from 9/20/23), EtOH use, acute necrotizing pancreatitis, splenic vein thrombosis (on Eliquis), asthma, and anemia who presents to ED for persistent abdominal pain and vomiting. He has been in and out of the hospital for the last two weeks. His most recent discharge was on10/19 after admission for recurrent symptoms of his acute necrotizing pancreatitis. He was discharged home on MSIR and Morphine which was working well until yesterday morning (10/21) when he started having increased abdominal pain w/nausea. He attempted to eat something but vomited. He denies any recent alcohol or tobacco use. He reports eating normally since discharge. Last meal was a grilled cheese yesterday for dinner. Last BM was loose and "oily" but normal color w/out blood present. He was evaluated by [facility] on his 10/5 admission who recommended against any intervention on the pancreatic collection due to immaturity. He has been on IV Cefepime since 10/5 with possible end date 11/2.     In the ED, he was afebrile and tachycardic to the 130s. Labs were notable for K 3.3, Bicarb 21, BUN 5, Alb 3.0, Lipase 96. Lactate WNL. Repeat CT A/P showed small R pleural effusion but no acute intra-abdominopelvic abnormality. Peripancreatic fluid collection at the pancreatic tail stable from previous imaging. He was given Dilaudid 2mg x1, Dilaudid 1mg x2 for pain and his evening dose of Cefepime. " Hospital Medicine was called for admission of     Procedure(s) (LRB):  ULTRASOUND, UPPER GI TRACT, ENDOSCOPIC (N/A)  ERCP (ENDOSCOPIC RETROGRADE CHOLANGIOPANCREATOGRAPHY) (N/A)      Hospital Course:   Admitted to hospital medicine for pain control. Underwent EUS with findings of stable fluid collection which was not drained. A repeat CT Abd/Pelvis on 10/29 showed similar prior fluid collection with new fluid along the posterior aspect of the liver causing IVC compression. ID consulted and antibiotics broadened to Meropenem + Diflucan on 10/30 after fevers on evening of 10/29. He underwent IR aspiration of his older collection on 11/2 w/ 7cc fluid aspirated. Fluid studies negative for infectious process at this time. Repeat imaging with stable vs. Interval decrease in abdominal collections, though notable large R-sided pleural effusion. Course complicated by acute respiratory failure with effusion, possibly 2/2 SIRS/third spacing 2/2 hypoalbuminemia. Pulmonology consulted for pleural efffusion, patient refusing thoracentesis. Trial of diuresis w/ improvement in oxygenation. PCA pump initiated after patient requiring 2mg dilaudid every 2H. Eventually pain was consulted for weaning PCA pump. Started on suboxone and MMPC. Will discharge home with plans to f/u with GI in 1 month for repeat scan. He will finish meropenem and fluconazole on 11/12/23.      Goals of Care Treatment Preferences:  Code Status: Full Code    Vitals:    11/09/23 0911 11/09/23 1000 11/09/23 1155 11/09/23 1210   BP:   112/74    BP Location:   Left arm    Patient Position:   Lying    Pulse:  91 93 83   Resp: 18 10 17    Temp:   98.8 °F (37.1 °C)    TempSrc:   Oral    SpO2:  (!) 93% 95% (!) 93%   Weight:       Height:         Physical Exam  Vitals and nursing note reviewed.   Constitutional:       General: He is not in acute distress.     Appearance: Normal appearance. He is not diaphoretic.   HENT:      Head: Normocephalic and atraumatic.       Mouth/Throat:      Mouth: Mucous membranes are moist.   Eyes:      General: No scleral icterus.     Extraocular Movements: Extraocular movements intact.   Neck:      Vascular: No carotid bruit, hepatojugular reflux or JVD.   Cardiovascular:      Rate and Rhythm: Normal rate and regular rhythm.      Pulses: Normal pulses.      Heart sounds: No murmur heard.     No friction rub.   Pulmonary:      Effort: Pulmonary effort is normal. No respiratory distress.      Breath sounds: Normal breath sounds. No wheezing.   Chest:      Chest wall: No tenderness.   Abdominal:      General: Abdomen is flat. Bowel sounds are normal. There is no distension.      Tenderness: There is abdominal tenderness.   Musculoskeletal:      Right lower leg: No edema.      Left lower leg: No edema.   Skin:     General: Skin is warm and dry.      Capillary Refill: Capillary refill takes less than 2 seconds.      Findings: No rash.   Neurological:      General: No focal deficit present.      Mental Status: He is alert and oriented to person, place, and time.   Psychiatric:         Mood and Affect: Mood normal.         Thought Content: Thought content normal.             Consults:   Consults (From admission, onward)          Status Ordering Provider     Inpatient consult to Midline team  Once        Provider:  (Not yet assigned)    Completed ABY MODI     Inpatient consult to Pulmonology  Once        Provider:  (Not yet assigned)    Completed RODGER MATHIAS     Inpatient consult to Interventional Radiology  Once        Provider:  (Not yet assigned)    Completed RODGER MATHIAS     Inpatient consult to General Surgery  Once        Provider:  (Not yet assigned)    Completed RODGER MATHIAS     Inpatient consult to Infectious Diseases  Once        Provider:  (Not yet assigned)    Completed RODGER MATHIAS     Inpatient consult to Interventional Radiology  Once        Provider:  (Not yet assigned)    Completed RODGER MATHIAS     Inpatient consult to Advanced Endoscopy  Service ([facility])  Once        Provider:  (Not yet assigned)    Completed RODGER MATHIAS            No new Assessment & Plan notes have been filed under this hospital service since the last note was generated.  Service: Hospital Medicine    Final Active Diagnoses:    Diagnosis Date Noted POA    PRINCIPAL PROBLEM:  Chronic pancreatitis [K86.1] 09/20/2023 Yes    Acute hypoxemic respiratory failure [J96.01] 11/05/2023 Yes    Pleural effusion [J90] 11/04/2023 Yes    Necrotizing pancreatitis [K85.91] 10/31/2023 Yes    Moderate malnutrition [E44.0] 10/31/2023 Yes    Sepsis [A41.9] 10/30/2023 No    Therapeutic opioid-induced constipation (OIC) [K59.03, T40.2X5A] 10/28/2023 No    Pancreatic pseudocyst/cyst [K86.2, K86.3] 10/24/2023 Yes    Alcohol use disorder [F10.90] 10/05/2023 Yes    Fluid collection of pancreas [K86.89] 09/30/2023 Yes    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    Specialty: Family Medicine  Contact information:  [address] [place] [address]  [phone]               [facility] [address] Follow up in 1 month(s).    Specialty: Gastroenterology  Contact information:  [address]  [facility] [address] [phone]  Additional information:  [facility] Center [facility]rology - Main Building, 4th Floor   Please park in [facility] and take [facility] elevator                         Patient Instructions:      CT Abdomen Pelvis With IV Contrast   Standing Status: Future Standing Exp. Date: 11/09/24     Order Specific Question Answer Comments   Does this patient have impaired renal function? No    May the Radiologist modify the order per  protocol to meet the clinical needs of the patient? Yes    Oral/Rectal Contrast instructions: Routine Oral Contrast      CBC W/ AUTO DIFFERENTIAL   Standing Status: Future Standing Exp. Date: 01/07/25     COMPREHENSIVE METABOLIC PANEL   Standing Status: Future Standing Exp. Date: 01/07/25     C-REACTIVE PROTEIN   Standing Status: Future Standing Exp. Date: 01/07/25     Ambulatory referral/consult to Pain Clinic   Standing Status: Future   Referral Priority: Routine Referral Type: Consultation   Referral Reason: Specialty Services Required   Requested Specialty: Pain Medicine   Number of Visits Requested: 1     Ambulatory referral/consult to [facility]   Standing Status: Future   Referral Priority: Routine Referral Type: Consultation   Referral Reason: Specialty Services Required   Number of Visits Requested: 1     Ambulatory referral/consult to Gastroenterology   Standing Status: Future   Referral Priority: Routine Referral Type: Consultation   Referral Reason: Specialty Services Required   Requested Specialty: Gastroenterology   Number of Visits Requested: 1     Diet Adult Regular     Activity as tolerated       Significant Diagnostic Studies: Labs: CMP   Recent Labs   Lab 11/08/23  0340 11/09/23 0317    138   K 4.1 4.5   CL 99 103   CO2 27 24    87   BUN 14 15   CREATININE 0.6 0.7   CALCIUM 9.5 9.8   PROT 7.0 6.8   ALBUMIN 2.8* 2.7*   BILITOT 0.2 0.2   ALKPHOS 87 83   AST 17 14   ALT 10 9*   ANIONGAP 12 11    and CBC   Recent Labs   Lab 11/08/23  0340 11/09/23 0317   WBC 5.74 6.98   HGB 7.7* 8.1*   HCT 25.6* 26.4*    363       Pending Diagnostic Studies:       Procedure Component Value Units Date/Time    FL ERCP Biliary And Pancreatic By Non Rad Tech [[id number]] Resulted: 10/23/23 1729    Order Status: Sent Lab Status: In process Updated: 10/23/23 1729    US Endoscopic Ultrasound [[id number]] Resulted: 10/23/23 1728    Order Status: Sent Lab Status: In process Updated:  10/23/23 8638           Medications:  Reconciled Home Medications:      Medication List        START taking these medications      buprenorphine-naloxone 8-2 mg 8-2 mg  Commonly known as: SUBOXONE  Place 2 Film under the tongue once daily. for 7 days  Start taking on: November 10, 2023     CREON 6,000-19,000 -30,000 unit capsule  Generic drug: lipase-protease-amylase 6,000-19,000-30,000 units  Take 2 capsules by mouth 3 (three) times daily with meals.     fluconazole 200 MG Tab  Commonly known as: DIFLUCAN  Take 2 tablets (400 mg total) by mouth once daily. for 2 days  Start taking on: November 10, 2023     folic acid 1 MG tablet  Commonly known as: FOLVITE  Take 1 tablet (1 mg total) by mouth once daily.     HYDROmorphone 2 MG tablet  Commonly known as: DILAUDID  Take 1 tablet (2 mg total) by mouth every 6 (six) hours as needed for Pain.     methocarbamoL 750 MG Tab  Commonly known as: ROBAXIN  Take 1 tablet (750 mg total) by mouth every 6 (six) hours. for 7 days     pantoprazole 40 MG tablet  Commonly known as: PROTONIX  Take 1 tablet (40 mg total) by mouth 2 (two) times daily.     sodium chloride 0.9 % PgBk 100 mL with meropenem 1 gram SolR 1 g  Inject 1 g into the vein every 8 (eight) hours. for 3 days            CONTINUE taking these medications      BIKTARVY -25 mg (25 kg or greater)  Generic drug: otqfpjbsf-plfxkprh-ilsyrkz ala  Take 1 tablet by mouth once daily.     ELIQUIS 5 mg Tab  Generic drug: apixaban  Take 1 tablet (5 mg total) by mouth 2 (two) times daily.     promethazine 25 MG tablet  Commonly known as: PHENERGAN  Take 25 mg by mouth every 4 (four) hours as needed for Nausea.            STOP taking these medications      dextrose 5 % in water (D5W) PgBk 100 mL with ceFEPIme 2 gram SolR 2 g     morphine 15 MG 12 hr tablet  Commonly known as: MS CONTIN     morphine 15 MG tablet  Commonly known as: MSIR              Indwelling Lines/Drains at time of discharge:   Lines/Drains/Airways        None                   Time spent on the discharge of patient: 55 minutes         Anabelle Rosas DO  Department of Hospital Medicine  [facility] - Int[facility] Care ([place]

## 2023-11-10 LAB — BACTERIA SPEC ANAEROBE CULT: NORMAL

## 2023-11-14 ENCOUNTER — HOSPITAL ENCOUNTER (INPATIENT)
Facility: HOSPITAL | Age: 23
LOS: 1 days | Discharge: HOME | DRG: 439 | End: 2023-11-15
Attending: EMERGENCY MEDICINE | Admitting: HOSPITALIST
Payer: MEDICAID

## 2023-11-14 DIAGNOSIS — R10.9 ABDOMINAL PAIN: ICD-10-CM

## 2023-11-14 DIAGNOSIS — K85.91 NECROTIZING PANCREATITIS: Primary | ICD-10-CM

## 2023-11-14 DIAGNOSIS — B20 HIV INFECTION, UNSPECIFIED SYMPTOM STATUS: ICD-10-CM

## 2023-11-14 DIAGNOSIS — R07.9 CHEST PAIN: ICD-10-CM

## 2023-11-14 LAB
ALBUMIN SERPL BCP-MCNC: 3 G/DL (ref 3.5–5.2)
ALP SERPL-CCNC: 72 U/L (ref 55–135)
ALT SERPL W/O P-5'-P-CCNC: 8 U/L (ref 10–44)
ANION GAP SERPL CALC-SCNC: 11 MMOL/L (ref 8–16)
AST SERPL-CCNC: 15 U/L (ref 10–40)
BASOPHILS # BLD AUTO: 0.03 K/UL (ref 0–0.2)
BASOPHILS NFR BLD: 0.5 % (ref 0–1.9)
BILIRUB SERPL-MCNC: 0.2 MG/DL (ref 0.1–1)
BUN SERPL-MCNC: 3 MG/DL (ref 6–30)
BUN SERPL-MCNC: 5 MG/DL (ref 6–20)
CALCIUM SERPL-MCNC: 9.4 MG/DL (ref 8.7–10.5)
CHLORIDE SERPL-SCNC: 102 MMOL/L (ref 95–110)
CHLORIDE SERPL-SCNC: 102 MMOL/L (ref 95–110)
CO2 SERPL-SCNC: 26 MMOL/L (ref 23–29)
CREAT SERPL-MCNC: 0.6 MG/DL (ref 0.5–1.4)
CREAT SERPL-MCNC: 0.7 MG/DL (ref 0.5–1.4)
DIFFERENTIAL METHOD: ABNORMAL
EOSINOPHIL # BLD AUTO: 0.5 K/UL (ref 0–0.5)
EOSINOPHIL NFR BLD: 7.9 % (ref 0–8)
ERYTHROCYTE [DISTWIDTH] IN BLOOD BY AUTOMATED COUNT: 19 % (ref 11.5–14.5)
EST. GFR  (NO RACE VARIABLE): >60 ML/MIN/1.73 M^2
GLUCOSE SERPL-MCNC: 84 MG/DL (ref 70–110)
GLUCOSE SERPL-MCNC: 87 MG/DL (ref 70–110)
HCT VFR BLD AUTO: 29.5 % (ref 40–54)
HCT VFR BLD CALC: 28 %PCV (ref 36–54)
HGB BLD-MCNC: 9.2 G/DL (ref 14–18)
IMM GRANULOCYTES # BLD AUTO: 0.02 K/UL (ref 0–0.04)
IMM GRANULOCYTES NFR BLD AUTO: 0.3 % (ref 0–0.5)
LACTATE SERPL-SCNC: 1.5 MMOL/L (ref 0.5–2.2)
LIPASE SERPL-CCNC: 398 U/L (ref 4–60)
LYMPHOCYTES # BLD AUTO: 2.6 K/UL (ref 1–4.8)
LYMPHOCYTES NFR BLD: 45.5 % (ref 18–48)
MCH RBC QN AUTO: 26.7 PG (ref 27–31)
MCHC RBC AUTO-ENTMCNC: 31.2 G/DL (ref 32–36)
MCV RBC AUTO: 86 FL (ref 82–98)
MONOCYTES # BLD AUTO: 0.6 K/UL (ref 0.3–1)
MONOCYTES NFR BLD: 10 % (ref 4–15)
NEUTROPHILS # BLD AUTO: 2.1 K/UL (ref 1.8–7.7)
NEUTROPHILS NFR BLD: 35.8 % (ref 38–73)
NRBC BLD-RTO: 0 /100 WBC
PLATELET # BLD AUTO: 521 K/UL (ref 150–450)
PMV BLD AUTO: 10.8 FL (ref 9.2–12.9)
POC IONIZED CALCIUM: 1.14 MMOL/L (ref 1.06–1.42)
POC TCO2 (MEASURED): 25 MMOL/L (ref 23–29)
POTASSIUM BLD-SCNC: 3.8 MMOL/L (ref 3.5–5.1)
POTASSIUM SERPL-SCNC: 3.6 MMOL/L (ref 3.5–5.1)
PROT SERPL-MCNC: 6.7 G/DL (ref 6–8.4)
RBC # BLD AUTO: 3.45 M/UL (ref 4.6–6.2)
SAMPLE: ABNORMAL
SODIUM BLD-SCNC: 139 MMOL/L (ref 136–145)
SODIUM SERPL-SCNC: 139 MMOL/L (ref 136–145)
WBC # BLD AUTO: 5.8 K/UL (ref 3.9–12.7)

## 2023-11-14 PROCEDURE — 96361 HYDRATE IV INFUSION ADD-ON: CPT

## 2023-11-14 PROCEDURE — G0378 HOSPITAL OBSERVATION PER HR: HCPCS

## 2023-11-14 PROCEDURE — 96375 TX/PRO/DX INJ NEW DRUG ADDON: CPT

## 2023-11-14 PROCEDURE — 96360 HYDRATION IV INFUSION INIT: CPT | Mod: 59

## 2023-11-14 PROCEDURE — 87040 BLOOD CULTURE FOR BACTERIA: CPT | Mod: 59 | Performed by: PHYSICIAN ASSISTANT

## 2023-11-14 PROCEDURE — 96376 TX/PRO/DX INJ SAME DRUG ADON: CPT

## 2023-11-14 PROCEDURE — 83605 ASSAY OF LACTIC ACID: CPT | Performed by: PHYSICIAN ASSISTANT

## 2023-11-14 PROCEDURE — 83690 ASSAY OF LIPASE: CPT | Performed by: NURSE PRACTITIONER

## 2023-11-14 PROCEDURE — 25500020 PHARM REV CODE 255: Performed by: EMERGENCY MEDICINE

## 2023-11-14 PROCEDURE — 96374 THER/PROPH/DIAG INJ IV PUSH: CPT

## 2023-11-14 PROCEDURE — 85025 COMPLETE CBC W/AUTO DIFF WBC: CPT | Performed by: NURSE PRACTITIONER

## 2023-11-14 PROCEDURE — 99285 EMERGENCY DEPT VISIT HI MDM: CPT | Mod: 25

## 2023-11-14 PROCEDURE — 80053 COMPREHEN METABOLIC PANEL: CPT | Performed by: PHYSICIAN ASSISTANT

## 2023-11-14 PROCEDURE — 25000003 PHARM REV CODE 250: Performed by: PHYSICIAN ASSISTANT

## 2023-11-14 PROCEDURE — 63600175 PHARM REV CODE 636 W HCPCS: Performed by: EMERGENCY MEDICINE

## 2023-11-14 PROCEDURE — 80047 BASIC METABLC PNL IONIZED CA: CPT

## 2023-11-14 PROCEDURE — 63600175 PHARM REV CODE 636 W HCPCS: Performed by: PHYSICIAN ASSISTANT

## 2023-11-14 RX ORDER — HYDROMORPHONE HYDROCHLORIDE 1 MG/ML
1 INJECTION, SOLUTION INTRAMUSCULAR; INTRAVENOUS; SUBCUTANEOUS
Status: COMPLETED | OUTPATIENT
Start: 2023-11-14 | End: 2023-11-14

## 2023-11-14 RX ORDER — IBUPROFEN 200 MG
16 TABLET ORAL
Status: DISCONTINUED | OUTPATIENT
Start: 2023-11-15 | End: 2023-11-15 | Stop reason: HOSPADM

## 2023-11-14 RX ORDER — ACETAMINOPHEN 325 MG/1
650 TABLET ORAL EVERY 4 HOURS PRN
Status: DISCONTINUED | OUTPATIENT
Start: 2023-11-15 | End: 2023-11-15 | Stop reason: HOSPADM

## 2023-11-14 RX ORDER — SODIUM CHLORIDE 0.9 % (FLUSH) 0.9 %
10 SYRINGE (ML) INJECTION EVERY 12 HOURS PRN
Status: DISCONTINUED | OUTPATIENT
Start: 2023-11-15 | End: 2023-11-15 | Stop reason: HOSPADM

## 2023-11-14 RX ORDER — HYDROMORPHONE HYDROCHLORIDE 1 MG/ML
0.5 INJECTION, SOLUTION INTRAMUSCULAR; INTRAVENOUS; SUBCUTANEOUS EVERY 6 HOURS PRN
Status: DISCONTINUED | OUTPATIENT
Start: 2023-11-15 | End: 2023-11-15 | Stop reason: HOSPADM

## 2023-11-14 RX ORDER — NALOXONE HCL 0.4 MG/ML
0.02 VIAL (ML) INJECTION
Status: DISCONTINUED | OUTPATIENT
Start: 2023-11-15 | End: 2023-11-15 | Stop reason: HOSPADM

## 2023-11-14 RX ORDER — GLUCAGON 1 MG
1 KIT INJECTION
Status: DISCONTINUED | OUTPATIENT
Start: 2023-11-15 | End: 2023-11-15 | Stop reason: HOSPADM

## 2023-11-14 RX ORDER — METHOCARBAMOL 750 MG/1
750 TABLET, FILM COATED ORAL EVERY 6 HOURS
Status: DISCONTINUED | OUTPATIENT
Start: 2023-11-15 | End: 2023-11-15 | Stop reason: HOSPADM

## 2023-11-14 RX ORDER — PROMETHAZINE HYDROCHLORIDE 12.5 MG/1
25 TABLET ORAL EVERY 4 HOURS PRN
Status: DISCONTINUED | OUTPATIENT
Start: 2023-11-15 | End: 2023-11-15 | Stop reason: HOSPADM

## 2023-11-14 RX ORDER — HYDROMORPHONE HYDROCHLORIDE 2 MG/1
2 TABLET ORAL EVERY 6 HOURS PRN
Status: DISCONTINUED | OUTPATIENT
Start: 2023-11-15 | End: 2023-11-15 | Stop reason: HOSPADM

## 2023-11-14 RX ORDER — HYDROMORPHONE HYDROCHLORIDE 1 MG/ML
2 INJECTION, SOLUTION INTRAMUSCULAR; INTRAVENOUS; SUBCUTANEOUS
Status: COMPLETED | OUTPATIENT
Start: 2023-11-14 | End: 2023-11-14

## 2023-11-14 RX ORDER — FOLIC ACID 1 MG/1
1 TABLET ORAL DAILY
Status: DISCONTINUED | OUTPATIENT
Start: 2023-11-15 | End: 2023-11-15 | Stop reason: HOSPADM

## 2023-11-14 RX ORDER — PROCHLORPERAZINE EDISYLATE 5 MG/ML
5 INJECTION INTRAMUSCULAR; INTRAVENOUS
Status: COMPLETED | OUTPATIENT
Start: 2023-11-14 | End: 2023-11-14

## 2023-11-14 RX ORDER — PANTOPRAZOLE SODIUM 40 MG/1
40 TABLET, DELAYED RELEASE ORAL 2 TIMES DAILY
Status: DISCONTINUED | OUTPATIENT
Start: 2023-11-15 | End: 2023-11-15 | Stop reason: HOSPADM

## 2023-11-14 RX ORDER — DIPHENHYDRAMINE HYDROCHLORIDE 50 MG/ML
25 INJECTION INTRAMUSCULAR; INTRAVENOUS
Status: COMPLETED | OUTPATIENT
Start: 2023-11-14 | End: 2023-11-14

## 2023-11-14 RX ORDER — BUPRENORPHINE AND NALOXONE 8; 2 MG/1; MG/1
2 FILM, SOLUBLE BUCCAL; SUBLINGUAL DAILY
Status: DISCONTINUED | OUTPATIENT
Start: 2023-11-15 | End: 2023-11-15 | Stop reason: HOSPADM

## 2023-11-14 RX ORDER — SODIUM CHLORIDE 9 MG/ML
INJECTION, SOLUTION INTRAVENOUS CONTINUOUS
Status: DISCONTINUED | OUTPATIENT
Start: 2023-11-15 | End: 2023-11-15 | Stop reason: HOSPADM

## 2023-11-14 RX ORDER — IBUPROFEN 200 MG
24 TABLET ORAL
Status: DISCONTINUED | OUTPATIENT
Start: 2023-11-15 | End: 2023-11-15 | Stop reason: HOSPADM

## 2023-11-14 RX ORDER — ONDANSETRON 2 MG/ML
4 INJECTION INTRAMUSCULAR; INTRAVENOUS EVERY 8 HOURS PRN
Status: DISCONTINUED | OUTPATIENT
Start: 2023-11-15 | End: 2023-11-15 | Stop reason: HOSPADM

## 2023-11-14 RX ADMIN — IOHEXOL 75 ML: 350 INJECTION, SOLUTION INTRAVENOUS at 09:11

## 2023-11-14 RX ADMIN — DIPHENHYDRAMINE HYDROCHLORIDE 25 MG: 50 INJECTION, SOLUTION INTRAMUSCULAR; INTRAVENOUS at 10:11

## 2023-11-14 RX ADMIN — PROCHLORPERAZINE EDISYLATE 5 MG: 5 INJECTION INTRAMUSCULAR; INTRAVENOUS at 07:11

## 2023-11-14 RX ADMIN — HYDROMORPHONE HYDROCHLORIDE 2 MG: 1 INJECTION, SOLUTION INTRAMUSCULAR; INTRAVENOUS; SUBCUTANEOUS at 07:11

## 2023-11-14 RX ADMIN — HYDROMORPHONE HYDROCHLORIDE 1 MG: 1 INJECTION, SOLUTION INTRAMUSCULAR; INTRAVENOUS; SUBCUTANEOUS at 10:11

## 2023-11-14 RX ADMIN — SODIUM CHLORIDE 250 ML: 9 INJECTION, SOLUTION INTRAVENOUS at 07:11

## 2023-11-14 NOTE — Clinical Note
Diagnosis: Necrotizing pancreatitis [129261]   Future Attending Provider: NESHA BYNUM [[id number]]   Admitting Provider:: NESHA BYNUM [[id number]]

## 2023-11-15 VITALS
OXYGEN SATURATION: 93 % | HEIGHT: 70 IN | HEART RATE: 79 BPM | BODY MASS INDEX: 22.13 KG/M2 | WEIGHT: 154.56 LBS | SYSTOLIC BLOOD PRESSURE: 132 MMHG | TEMPERATURE: 98 F | RESPIRATION RATE: 15 BRPM | DIASTOLIC BLOOD PRESSURE: 87 MMHG

## 2023-11-15 PROBLEM — R52 PAIN: Status: ACTIVE | Noted: 2023-11-15

## 2023-11-15 PROBLEM — J98.11 ATELECTASIS: Status: ACTIVE | Noted: 2023-11-15

## 2023-11-15 LAB
ALBUMIN SERPL BCP-MCNC: 2.7 G/DL (ref 3.5–5.2)
ALP SERPL-CCNC: 64 U/L (ref 55–135)
ALT SERPL W/O P-5'-P-CCNC: 6 U/L (ref 10–44)
ANION GAP SERPL CALC-SCNC: 11 MMOL/L (ref 8–16)
AST SERPL-CCNC: 12 U/L (ref 10–40)
BASOPHILS # BLD AUTO: 0.02 K/UL (ref 0–0.2)
BASOPHILS NFR BLD: 0.5 % (ref 0–1.9)
BILIRUB SERPL-MCNC: 0.2 MG/DL (ref 0.1–1)
BILIRUB UR QL STRIP: NEGATIVE
BUN SERPL-MCNC: 18 MG/DL (ref 6–30)
BUN SERPL-MCNC: 5 MG/DL (ref 6–20)
CALCIUM SERPL-MCNC: 8.8 MG/DL (ref 8.7–10.5)
CHLORIDE SERPL-SCNC: 107 MMOL/L (ref 95–110)
CHLORIDE SERPL-SCNC: 97 MMOL/L (ref 95–110)
CLARITY UR REFRACT.AUTO: CLEAR
CO2 SERPL-SCNC: 23 MMOL/L (ref 23–29)
COLOR UR AUTO: YELLOW
CREAT SERPL-MCNC: 0.6 MG/DL (ref 0.5–1.4)
CREAT SERPL-MCNC: 0.9 MG/DL (ref 0.5–1.4)
DIFFERENTIAL METHOD: ABNORMAL
EOSINOPHIL # BLD AUTO: 0.3 K/UL (ref 0–0.5)
EOSINOPHIL NFR BLD: 7.7 % (ref 0–8)
ERYTHROCYTE [DISTWIDTH] IN BLOOD BY AUTOMATED COUNT: 18.3 % (ref 11.5–14.5)
EST. GFR  (NO RACE VARIABLE): >60 ML/MIN/1.73 M^2
GLUCOSE SERPL-MCNC: 321 MG/DL (ref 70–110)
GLUCOSE SERPL-MCNC: 83 MG/DL (ref 70–110)
GLUCOSE UR QL STRIP: NEGATIVE
HCT VFR BLD AUTO: 26.7 % (ref 40–54)
HCT VFR BLD CALC: 41 %PCV (ref 36–54)
HGB BLD-MCNC: 8 G/DL (ref 14–18)
HGB UR QL STRIP: NEGATIVE
HYPOCHROMIA BLD QL SMEAR: ABNORMAL
IMM GRANULOCYTES # BLD AUTO: 0.02 K/UL (ref 0–0.04)
IMM GRANULOCYTES NFR BLD AUTO: 0.5 % (ref 0–0.5)
KETONES UR QL STRIP: NEGATIVE
LEUKOCYTE ESTERASE UR QL STRIP: NEGATIVE
LYMPHOCYTES # BLD AUTO: 2 K/UL (ref 1–4.8)
LYMPHOCYTES NFR BLD: 51.3 % (ref 18–48)
MAGNESIUM SERPL-MCNC: 1.6 MG/DL (ref 1.6–2.6)
MCH RBC QN AUTO: 25.6 PG (ref 27–31)
MCHC RBC AUTO-ENTMCNC: 30 G/DL (ref 32–36)
MCV RBC AUTO: 85 FL (ref 82–98)
MONOCYTES # BLD AUTO: 0.4 K/UL (ref 0.3–1)
MONOCYTES NFR BLD: 10.6 % (ref 4–15)
NEUTROPHILS # BLD AUTO: 1.1 K/UL (ref 1.8–7.7)
NEUTROPHILS NFR BLD: 29.4 % (ref 38–73)
NITRITE UR QL STRIP: NEGATIVE
NRBC BLD-RTO: 0 /100 WBC
PH UR STRIP: 8 [PH] (ref 5–8)
PHOSPHATE SERPL-MCNC: 4.8 MG/DL (ref 2.7–4.5)
PLATELET # BLD AUTO: 393 K/UL (ref 150–450)
PLATELET BLD QL SMEAR: ABNORMAL
PMV BLD AUTO: 9.7 FL (ref 9.2–12.9)
POC IONIZED CALCIUM: 1.18 MMOL/L (ref 1.06–1.42)
POC TCO2 (MEASURED): 28 MMOL/L (ref 23–27)
POTASSIUM BLD-SCNC: 4.1 MMOL/L (ref 3.5–5.1)
POTASSIUM SERPL-SCNC: 3.5 MMOL/L (ref 3.5–5.1)
PROT SERPL-MCNC: 6 G/DL (ref 6–8.4)
PROT UR QL STRIP: NEGATIVE
RBC # BLD AUTO: 3.13 M/UL (ref 4.6–6.2)
SAMPLE: ABNORMAL
SODIUM BLD-SCNC: 136 MMOL/L (ref 136–145)
SODIUM SERPL-SCNC: 141 MMOL/L (ref 136–145)
SP GR UR STRIP: >1.03 (ref 1–1.03)
URN SPEC COLLECT METH UR: ABNORMAL
WBC # BLD AUTO: 3.88 K/UL (ref 3.9–12.7)

## 2023-11-15 PROCEDURE — 25000003 PHARM REV CODE 250

## 2023-11-15 PROCEDURE — 80053 COMPREHEN METABOLIC PANEL: CPT

## 2023-11-15 PROCEDURE — 99238 PR HOSPITAL DISCHARGE DAY,<30 MIN: ICD-10-PCS | Mod: ,,, | Performed by: HOSPITALIST

## 2023-11-15 PROCEDURE — 83735 ASSAY OF MAGNESIUM: CPT

## 2023-11-15 PROCEDURE — 99238 HOSP IP/OBS DSCHRG MGMT 30/<: CPT | Mod: ,,, | Performed by: HOSPITALIST

## 2023-11-15 PROCEDURE — 96361 HYDRATE IV INFUSION ADD-ON: CPT

## 2023-11-15 PROCEDURE — 25000003 PHARM REV CODE 250: Performed by: HOSPITALIST

## 2023-11-15 PROCEDURE — 81003 URINALYSIS AUTO W/O SCOPE: CPT | Performed by: NURSE PRACTITIONER

## 2023-11-15 PROCEDURE — 63600175 PHARM REV CODE 636 W HCPCS

## 2023-11-15 PROCEDURE — 11000001 HC ACUTE MED/SURG PRIVATE ROOM

## 2023-11-15 PROCEDURE — 84100 ASSAY OF PHOSPHORUS: CPT

## 2023-11-15 PROCEDURE — 96376 TX/PRO/DX INJ SAME DRUG ADON: CPT

## 2023-11-15 PROCEDURE — 85025 COMPLETE CBC W/AUTO DIFF WBC: CPT

## 2023-11-15 PROCEDURE — 36415 COLL VENOUS BLD VENIPUNCTURE: CPT

## 2023-11-15 RX ORDER — AMOXICILLIN 250 MG
1 CAPSULE ORAL 2 TIMES DAILY
Status: DISCONTINUED | OUTPATIENT
Start: 2023-11-15 | End: 2023-11-15 | Stop reason: HOSPADM

## 2023-11-15 RX ORDER — CELECOXIB 200 MG/1
200 CAPSULE ORAL DAILY
Status: DISCONTINUED | OUTPATIENT
Start: 2023-11-15 | End: 2023-11-15 | Stop reason: HOSPADM

## 2023-11-15 RX ORDER — PREGABALIN 150 MG/1
150 CAPSULE ORAL 2 TIMES DAILY
Qty: 60 CAPSULE | Refills: 0 | Status: SHIPPED | OUTPATIENT
Start: 2023-11-15 | End: 2023-11-29

## 2023-11-15 RX ORDER — DRONABINOL 5 MG/1
5 CAPSULE ORAL DAILY
Status: DISCONTINUED | OUTPATIENT
Start: 2023-11-15 | End: 2023-11-15 | Stop reason: HOSPADM

## 2023-11-15 RX ORDER — OXYCODONE HYDROCHLORIDE 5 MG/1
5 TABLET ORAL EVERY 6 HOURS PRN
Status: DISCONTINUED | OUTPATIENT
Start: 2023-11-15 | End: 2023-11-15

## 2023-11-15 RX ORDER — OXYCODONE HYDROCHLORIDE 5 MG/1
5 TABLET ORAL EVERY 4 HOURS PRN
Qty: 21 TABLET | Refills: 0 | Status: ON HOLD | OUTPATIENT
Start: 2023-11-15 | End: 2023-12-21 | Stop reason: CLARIF

## 2023-11-15 RX ORDER — BUPRENORPHINE AND NALOXONE 8; 2 MG/1; MG/1
2 FILM, SOLUBLE BUCCAL; SUBLINGUAL DAILY
Qty: 14 FILM | Refills: 0 | Status: SHIPPED | OUTPATIENT
Start: 2023-11-15 | End: 2023-11-22

## 2023-11-15 RX ORDER — PREGABALIN 150 MG/1
150 CAPSULE ORAL 2 TIMES DAILY
Status: DISCONTINUED | OUTPATIENT
Start: 2023-11-15 | End: 2023-11-15 | Stop reason: HOSPADM

## 2023-11-15 RX ORDER — OXYCODONE HYDROCHLORIDE 5 MG/1
5 TABLET ORAL EVERY 4 HOURS PRN
Status: DISCONTINUED | OUTPATIENT
Start: 2023-11-15 | End: 2023-11-15 | Stop reason: HOSPADM

## 2023-11-15 RX ORDER — CELECOXIB 200 MG/1
200 CAPSULE ORAL DAILY
Qty: 14 CAPSULE | Refills: 0 | Status: SHIPPED | OUTPATIENT
Start: 2023-11-16 | End: 2023-11-29

## 2023-11-15 RX ORDER — HYDROXYZINE PAMOATE 25 MG/1
25 CAPSULE ORAL EVERY 8 HOURS PRN
Status: DISCONTINUED | OUTPATIENT
Start: 2023-11-15 | End: 2023-11-15 | Stop reason: HOSPADM

## 2023-11-15 RX ADMIN — SODIUM CHLORIDE: 9 INJECTION, SOLUTION INTRAVENOUS at 12:11

## 2023-11-15 RX ADMIN — BICTEGRAVIR SODIUM, EMTRICITABINE, AND TENOFOVIR ALAFENAMIDE FUMARATE 1 TABLET: 50; 200; 25 TABLET ORAL at 09:11

## 2023-11-15 RX ADMIN — PREGABALIN 150 MG: 150 CAPSULE ORAL at 09:11

## 2023-11-15 RX ADMIN — HYDROMORPHONE HYDROCHLORIDE 0.5 MG: 1 INJECTION, SOLUTION INTRAMUSCULAR; INTRAVENOUS; SUBCUTANEOUS at 03:11

## 2023-11-15 RX ADMIN — HYDROMORPHONE HYDROCHLORIDE 2 MG: 2 TABLET ORAL at 09:11

## 2023-11-15 RX ADMIN — DOCUSATE SODIUM AND SENNOSIDES 1 TABLET: 8.6; 5 TABLET, FILM COATED ORAL at 09:11

## 2023-11-15 RX ADMIN — PANCRELIPASE 2 CAPSULE: 30000; 6000; 19000 CAPSULE, DELAYED RELEASE PELLETS ORAL at 04:11

## 2023-11-15 RX ADMIN — PANTOPRAZOLE SODIUM 40 MG: 40 TABLET, DELAYED RELEASE ORAL at 10:11

## 2023-11-15 RX ADMIN — HYDROMORPHONE HYDROCHLORIDE 0.5 MG: 1 INJECTION, SOLUTION INTRAMUSCULAR; INTRAVENOUS; SUBCUTANEOUS at 06:11

## 2023-11-15 RX ADMIN — DRONABINOL 5 MG: 5 CAPSULE ORAL at 09:11

## 2023-11-15 RX ADMIN — METHOCARBAMOL 750 MG: 750 TABLET ORAL at 12:11

## 2023-11-15 RX ADMIN — PANCRELIPASE 2 CAPSULE: 30000; 6000; 19000 CAPSULE, DELAYED RELEASE PELLETS ORAL at 12:11

## 2023-11-15 RX ADMIN — CELECOXIB 200 MG: 200 CAPSULE ORAL at 09:11

## 2023-11-15 RX ADMIN — METHOCARBAMOL 750 MG: 750 TABLET ORAL at 06:11

## 2023-11-15 RX ADMIN — HYDROMORPHONE HYDROCHLORIDE 2 MG: 2 TABLET ORAL at 04:11

## 2023-11-15 RX ADMIN — PROMETHAZINE HYDROCHLORIDE 25 MG: 12.5 TABLET ORAL at 01:11

## 2023-11-15 RX ADMIN — METHOCARBAMOL 750 MG: 750 TABLET ORAL at 05:11

## 2023-11-15 RX ADMIN — FOLIC ACID 1 MG: 1 TABLET ORAL at 09:11

## 2023-11-15 RX ADMIN — HYDROMORPHONE HYDROCHLORIDE 2 MG: 2 TABLET ORAL at 01:11

## 2023-11-15 RX ADMIN — BUPRENORPHINE AND NALOXONE 2 FILM: 8; 2 FILM BUCCAL; SUBLINGUAL at 11:11

## 2023-11-15 NOTE — PLAN OF CARE
[facility]  Initial Discharge Assessment       Primary Care Provider: Pina Jones NP    Admission Diagnosis: Necrotizing pancreatitis [K85.91]  Abdominal pain [R10.9]  Chest pain [R07.9]  HIV infection, unspecified symptom status [B20]    Admission Date: 11/14/2023  Expected Discharge Date: 11/17/2023         Payor: /     Extended Emergency Contact Information  Primary Emergency Contact: Denice Cardona  Mobile Phone: [phone]  Relation: Mother  Preferred language: English   needed? No    Discharge Plan A: Home  Discharge Plan B: Home      [id number] - [place], [place] - [address] AT [facility] [address]  [address]  [facility] [address]  Phone: [phone] Fax: [phone]    [facility] [place]  [place] - [address] [facility] [place]  [address] [facility] [place] [address]  Phone: [phone] Fax: [phone]      Initial Assessment (most recent)       Adult Discharge Assessment - 11/15/23 0954          Discharge Assessment    Assessment Type Discharge Planning Brief Assessment     Confirmed/corrected address, phone number and insurance Yes     Confirmed Demographics Correct on Facesheet     Source of Information patient     Does patient/caregiver understand observation status Yes     Communicated CASTILLO with patient/caregiver Yes     People in Home alone     Do you expect to return to your current living situation? Yes     Do you have help at home or someone to help you manage your care at home? Yes     Who are your caregiver(s) and their phone number(s)? Denice Cardona (Mother) [phone]     Prior to hospitilization cognitive status: Alert/Oriented     Current cognitive status: Alert/Oriented     Equipment Currently Used at Home none     Readmission within 30 days? No     Patient currently being followed by outpatient case management? No     Do you currently have service(s) that help you manage your care at home? No      Do you take prescription medications? Yes     Do you have prescription coverage? Yes     Do you have any problems affording any of your prescribed medications? No     Is the patient taking medications as prescribed? yes     Who is going to help you get home at discharge? Friends if needed     How do you get to doctors appointments? family or friend will provide     Are you on dialysis? No     Do you take coumadin? No     DME Needed Upon Discharge  none     Discharge Plan A Home     Discharge Plan B Home        Physical Activity    On average, how many days per week do you engage in moderate to strenuous exercise (like a brisk walk)? 0 days     On average, how many minutes do you engage in exercise at this level? 0 min        Financial Resource Strain    How hard is it for you to pay for the very basics like food, housing, medical care, and heating? Not very hard        Housing Stability    In the last 12 months, was there a time when you were not able to pay the mortgage or rent on time? No     In the last 12 months, how many places have you lived? 1     In the last 12 months, was there a time when you did not have a steady place to sleep or slept in a shelter (including now)? No        Food Insecurity    Within the past 12 months, you worried that your food would run out before you got the money to buy more. Never true     Within the past 12 months, the food you bought just didn't last and you didn't have money to get more. Never true        Stress    Do you feel stress - tense, restless, nervous, or anxious, or unable to sleep at night because your mind is troubled all the time - these days? Only a little        Social Connections    In a typical week, how many times do you talk on the phone with family, friends, or neighbors? More than three times a week     How often do you get together with friends or relatives? More than three times a week     How often do you attend Religion or Jew services? Never      Do you belong to any clubs or organizations such as Mandaen groups, unions, fraternal or athletic groups, or school groups? No     How often do you attend meetings of the clubs or organizations you belong to? Never     Are you , , , , never , or living with a partner? Never         Alcohol Use    Q1: How often do you have a drink containing alcohol? Never     Q2: How many drinks containing alcohol do you have on a typical day when you are drinking? Patient does not drink     Q3: How often do you have six or more drinks on one occasion? Never                   Spoke with patient at bedside to complete d/c planning assessment. Patient lives alone in a first floor apartment. Independent with ADL's. No DME in Home. Patient is a  and has reliable transportation for work and Appts as needed. Verified PCP and Pharmacy. [facility] is currently showing patient as uninsured. Patient reports he had to send updated information to medicaid but received notice he was approved. E-Mail sent to [facility] to verify status of patient's medicaid. Patient was receiving IVAB at home from [facility].  sent referral to agency to resume care at d/c if still needed. Discharge Plan A and Plan B have been determined by review of patient's clinical status, future medical and therapeutic needs, and coverage/benefits for post-acute care in coordination with multidisciplinary team members.      Concepcion ROUSE  Case Management  [facility]  [phone]

## 2023-11-15 NOTE — PLAN OF CARE
Problem: Adult Inpatient Plan of Care  Goal: Plan of Care Review  Outcome: Ongoing, Progressing  Goal: Patient-Specific Goal (Individualized)  Outcome: Ongoing, Progressing  Goal: Absence of Hospital-Acquired Illness or Injury  Outcome: Ongoing, Progressing  Goal: Optimal Comfort and Wellbeing  Outcome: Ongoing, Progressing  Goal: Readiness for Transition of Care  Outcome: Ongoing, Progressing     Problem: Adult Inpatient Plan of Care  Goal: Plan of Care Review  Outcome: Ongoing, Progressing     Problem: Adult Inpatient Plan of Care  Goal: Patient-Specific Goal (Individualized)  Outcome: Ongoing, Progressing     Problem: Adult Inpatient Plan of Care  Goal: Absence of Hospital-Acquired Illness or Injury  Outcome: Ongoing, Progressing     Problem: Adult Inpatient Plan of Care  Goal: Optimal Comfort and Wellbeing  Outcome: Ongoing, Progressing     Problem: Adult Inpatient Plan of Care  Goal: Readiness for Transition of Care  Outcome: Ongoing, Progressing     Problem: Adjustment to Illness (Sepsis/Septic Shock)  Goal: Optimal Coping  Outcome: Ongoing, Progressing     Problem: Bleeding (Sepsis/Septic Shock)  Goal: Absence of Bleeding  Outcome: Ongoing, Progressing     Problem: Bleeding (Sepsis/Septic Shock)  Goal: Absence of Bleeding  Outcome: Ongoing, Progressing     Problem: Infection  Goal: Absence of Infection Signs and Symptoms  Outcome: Ongoing, Progressing     PRN medication  somewhat effective for pain . Explained plan of care, verbalized understanding Remained NPO except sips with meds  . Pt concern for pain management and states IV pain meds provide more relief . Educated pt .on new medicine . No injury during shift, Side rails up x 2, call light by bedside.       11-Nov-2021

## 2023-11-15 NOTE — PLAN OF CARE
Problem: Adult Inpatient Plan of Care  Goal: Plan of Care Review  Outcome: Ongoing, Progressing  Goal: Patient-Specific Goal (Individualized)  Outcome: Ongoing, Progressing  Goal: Absence of Hospital-Acquired Illness or Injury  Outcome: Ongoing, Progressing  Goal: Optimal Comfort and Wellbeing  Outcome: Ongoing, Progressing  Goal: Readiness for Transition of Care  Outcome: Ongoing, Progressing     Problem: Adjustment to Illness (Sepsis/Septic Shock)  Goal: Optimal Coping  Outcome: Ongoing, Progressing     Problem: Bleeding (Sepsis/Septic Shock)  Goal: Absence of Bleeding  Outcome: Ongoing, Progressing     Problem: Glycemic Control Impaired (Sepsis/Septic Shock)  Goal: Blood Glucose Level Within Desired Range  Outcome: Ongoing, Progressing     Problem: Infection Progression (Sepsis/Septic Shock)  Goal: Absence of Infection Signs and Symptoms  Outcome: Ongoing, Progressing     Problem: Nutrition Impaired (Sepsis/Septic Shock)  Goal: Optimal Nutrition Intake  Outcome: Ongoing, Progressing     Problem: Infection  Goal: Absence of Infection Signs and Symptoms  Outcome: Ongoing, Progressing     Pt pain managed with scheduled and prn meds. Cont IVF infusing. NAD noted.

## 2023-11-15 NOTE — ED TRIAGE NOTES
Patient presents to ER for evaluation of abdominal pain. Reports he was recently hospitalized for necrotizing pancreatitis.

## 2023-11-15 NOTE — PLAN OF CARE
11/15/23 0855   Post-Acute Status   Post-Acute Authorization IV Infusion   IV Infusion Status Referral(s) sent   Discharge Plan   Discharge Plan A Home;Other  (IV- ABX)   Discharge Plan B   (IV- ABX)     SW faxed referral to [facility] (resume services upon discharge) via [facility] for review. PING will follow up as needed.      Rebecca Saul, FRANNY  Case Management   [facility]   Ext. [phone]

## 2023-11-15 NOTE — CARE UPDATE
I have reviewed the chart of Tasia Cardona and collaborated with Osmin Dutta MD in the care of the patient who is hospitalized for the following:    Active Hospital Problems    Diagnosis    *Chronic pancreatitis    Atelectasis    uncontrolled pain    Bilateral pleural effusion    Alcohol use disorder    Fluid collection of pancreas    Splenic vein thrombosis    Normocytic anemia    HIV infection          I have reviewed Tasia Cardona with the multidisciplinary team during discharge huddle.       Anne Costa PA-C  [facility]

## 2023-11-15 NOTE — ED NOTES
I-STAT Chem-8+ Results:   Value Reference Range   Sodium 139 136-145 mmol/L   Potassium  3.8 3.5-5.1 mmol/L   Chloride 102  mmol/L   Ionized Calcium 1.14 1.06-1.42 mmol/L   CO2 (measured) 25 23-29 mmol/L   Glucose 87  mg/dL   BUN 3 6-30 mg/dL   Creatinine 0.6 0.5-1.4 mg/dL   Hematocrit 28 36-54%

## 2023-11-15 NOTE — H&P
[facility] - Emergency Dept  [facility] Medicine  History & Physical    Patient Name: Tasia Cardona  MRN: [id number]  Patient Class: OP- Observation  Admission Date: 11/14/2023  Attending Physician: Osmin Dutta MD   Primary Care Provider: Pina Jones NP         Patient information was obtained from patient and ER records.     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 Recently discharged 6 days ago after 18 day admission for necrotizing pancreatitis. Unable to tolerate pain meds at home due to vomiting. He went to [facility] 2 days ago for the pain because he said it was a closer ED. He said they wanted to discontinue Meropenem and he requested to be discharged. He said they were only giving him oxycodone which was not controlling it as well and dilaudid works better.     ED: Lipase 398 today, highest in recent history. CT with large lobulated peripancreatic fluid collection could represent a large pancreatic pseudocyst, mildly increased in size from the prior study - Pancreatic inflammatory changes have decreased. Vomited x1 since ED arrival. Given Dilaudid 1g x1 and 2g x1. 250cc's NS given.      Past Medical History:   Diagnosis Date    Acute necrotizing pancreatitis 09/20/2023    Alcohol use disorder 10/05/2023    Asthma     Hepatitis C antibody positive in blood 09/18/2023 9/2023 PCR negative    HIV infection 10/2021    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       Past  Surgical History:   Procedure Laterality Date    ENDOSCOPIC ULTRASOUND OF UPPER GASTROINTESTINAL TRACT N/A 10/23/2023    Procedure: ULTRASOUND, UPPER GI TRACT, ENDOSCOPIC;  Surgeon: Emil Villatoro MD;  Location: [facility] (2ND FLR);  Service: Endoscopy;  Laterality: N/A;    ERCP N/A 10/23/2023    Procedure: ERCP (ENDOSCOPIC RETROGRADE CHOLANGIOPANCREATOGRAPHY);  Surgeon: Emil Villatoro MD;  Location: [facility] (2ND FLR);  Service: Endoscopy;  Laterality: N/A;    ESOPHAGOGASTRODUODENOSCOPY N/A 9/18/2023    Procedure: EGD (ESOPHAGOGASTRODUODENOSCOPY);  Surgeon: Kg Cleaning MD;  Location: [facility] (2ND FLR);  Service: Endoscopy;  Laterality: N/A;       Review of patient's allergies indicates:   Allergen Reactions    [place] Swelling    Pcn [penicillins] Hives     Tolerates cephalosporins    Pecan nut Swelling    Sulfa (sulfonamide antibiotics) Hives       Current Facility-Administered Medications on File Prior to Encounter   Medication    [DISCONTINUED] apixaban tablet 5 mg    [DISCONTINUED] yzrpgxwfv-cdxkxfpc-ljtslki ala -25 mg (25 kg or greater) 1 tablet    [DISCONTINUED] buprenorphine-naloxone 8-2 mg SL film 2 Film    [DISCONTINUED] folic acid tablet 1 mg    [DISCONTINUED] HYDROmorphone tablet 2 mg    [DISCONTINUED] lipase-protease-amylase 6,000-19,000-30,000 units per capsule 2 capsule    [DISCONTINUED] methocarbamoL tablet 750 mg    [DISCONTINUED] pantoprazole EC tablet 40 mg    [DISCONTINUED] promethazine tablet 25 mg     Current Outpatient Medications on File Prior to Encounter   Medication Sig    apixaban (ELIQUIS) 5 mg Tab Take 1 tablet (5 mg total) by mouth 2 (two) times daily.    vbbjlmvdt-wbjnsfpu-mpzjpjh ala (BIKTARVY) -25 mg (25 kg or greater) Take 1 tablet by mouth once daily.    buprenorphine-naloxone 8-2 mg (SUBOXONE) 8-2 mg Place 2 Film under the tongue once daily. for 7 days    folic acid (FOLVITE) 1 MG tablet Take 1 tablet (1 mg total) by mouth once daily.    HYDROmorphone  (DILAUDID) 2 MG tablet Take 1 tablet (2 mg total) by mouth every 6 (six) hours as needed for Pain.    lipase-protease-amylase 6,000-19,000-30,000 units (CREON 6000) 6,000-19,000 -30,000 unit capsule Take 2 capsules by mouth 3 (three) times daily with meals.    methocarbamoL (ROBAXIN) 750 MG Tab Take 1 tablet (750 mg total) by mouth every 6 (six) hours. for 7 days    pantoprazole (PROTONIX) 40 MG tablet Take 1 tablet (40 mg total) by mouth 2 (two) times daily.    promethazine (PHENERGAN) 25 MG tablet Take 25 mg by mouth every 4 (four) hours as needed for Nausea.     Family History       Problem Relation (Age of Onset)    Breast cancer Maternal Grandmother    Cancer Mother, Brother    No Known Problems Father    Ovarian cancer Mother    Pancreatitis Mother          Tobacco Use    Smoking status: Former     Types: Cigarettes    Smokeless tobacco: Never   Substance and Sexual Activity    Alcohol use: Not Currently    Drug use: Never    Sexual activity: Not on file     Review of Systems   Constitutional:  Negative for activity change, appetite change, chills, fatigue and fever.   Eyes:  Negative for photophobia and visual disturbance.   Respiratory:  Negative for cough, chest tightness, shortness of breath and wheezing.    Cardiovascular:  Negative for chest pain.   Gastrointestinal:  Positive for abdominal pain, nausea and vomiting. Negative for abdominal distention, blood in stool, constipation and diarrhea.   Genitourinary:  Negative for difficulty urinating and dysuria.   Musculoskeletal:  Negative for gait problem.   Skin:  Negative for color change.   Neurological:  Negative for dizziness, weakness, light-headedness and headaches.   Psychiatric/Behavioral:  Negative for agitation, behavioral problems and confusion.      Objective:     Vital Signs (Most Recent):  Temp: 97.9 °F (36.6 °C) (11/15/23 0027)  Pulse: 65 (11/15/23 0027)  Resp: 16 (11/15/23 0027)  BP: 110/61 (11/15/23 0027)  SpO2: 98 % (11/15/23 0027) Vital  Signs (24h Range):  Temp:  [97.9 °F (36.6 °C)-98.9 °F (37.2 °C)] 97.9 °F (36.6 °C)  Pulse:  [65-97] 65  Resp:  [16-18] 16  SpO2:  [98 %] 98 %  BP: (110-132)/(61-88) 110/61     Weight: 70.3 kg (155 lb)  Body mass index is 22.24 kg/m².     Physical Exam  Constitutional:       General: He is not in acute distress.     Appearance: Normal appearance. He is normal weight. He is not ill-appearing or toxic-appearing.   HENT:      Head: Normocephalic and atraumatic.      Right Ear: External ear normal.      Left Ear: External ear normal.      Nose: Nose normal.   Eyes:      General: No scleral icterus.     Pupils: Pupils are equal, round, and reactive to light.   Cardiovascular:      Rate and Rhythm: Normal rate and regular rhythm.      Heart sounds: No murmur heard.     No friction rub.   Pulmonary:      Effort: Pulmonary effort is normal. No respiratory distress.      Breath sounds: Normal breath sounds. No wheezing or rales.   Abdominal:      General: Abdomen is flat. There is no distension.      Palpations: Abdomen is soft. There is no mass.      Tenderness: There is abdominal tenderness. There is no guarding or rebound.   Musculoskeletal:         General: No swelling or tenderness.      Cervical back: Normal range of motion and neck supple.   Skin:     General: Skin is warm and dry.      Coloration: Skin is not jaundiced.   Neurological:      General: No focal deficit present.      Mental Status: He is alert and oriented to person, place, and time.   Psychiatric:         Mood and Affect: Mood normal.         Behavior: Behavior normal.              CRANIAL NERVES     CN III, IV, VI   Pupils are equal, round, and reactive to light.       Significant Labs: All pertinent labs within the past 24 hours have been reviewed.  CBC:   Recent Labs   Lab 11/14/23  1923 11/14/23 2136   WBC 5.80  --    HGB 9.2*  --    HCT 29.5* 28*   *  --      CMP:   Recent Labs   Lab 11/14/23 2134      K 3.6      CO2 26   GLU 84    BUN 5*   CREATININE 0.7   CALCIUM 9.4   PROT 6.7   ALBUMIN 3.0*   BILITOT 0.2   ALKPHOS 72   AST 15   ALT 8*   ANIONGAP 11     Lactic Acid:   Recent Labs   Lab 11/14/23 1923   LACTATE 1.5     Lipase:   Recent Labs   Lab 11/14/23 1923   LIPASE 398*     Urine Studies:   Recent Labs   Lab 11/13/23  1006   COLORU Yellow   GLUCUA Normal   BILIRUBINUA Negative   OCCULTUA Negative   UROBILINOGEN Normal   LEUKOCYTESUR Negative       Significant Imaging: I have reviewed all pertinent imaging results/findings within the past 24 hours.  Imaging Results               CT Abdomen Pelvis With IV Contrast (Final result)  Result time 11/14/23 21:32:23      Final result by Wilver Henry MD (11/14/23 21:32:23)                   Impression:      1. History of pancreatitis with large lobulated peripancreatic fluid collection could represent a large pancreatic pseudocyst, mildly increased in size from the prior study.  See above comments.  Pancreatic inflammatory changes have decreased.  See above comments.  Recommend clinical correlation and follow-up.  2. Small bibasilar pleural effusions with mild associated atelectasis.  3. Hepatosplenomegaly with chronic splenic vein thrombosis with multiple collateral vessels noted similar to prior studies.  4. Mild diffuse urinary bladder wall thickening could represent cystitis.  Recommend clinical correlation and follow-up.  5. Stable mild dilation of the appendix to approximately 9 mm with mild wall thickening and mild adjacent stranding.  This is similar to the prior study.  Mild acute appendicitis is not excluded.  Recommend clinical correlation and follow-up.  6. Possible constipation.  7. Mild nonspecific free fluid in the pelvis.  8. This report was flagged in Epic as abnormal.      Electronically signed by: Wilver Henry  Date:    11/14/2023  Time:    21:32               Narrative:    EXAMINATION:  CT ABDOMEN PELVIS WITH IV CONTRAST    CLINICAL HISTORY:  Pancreatitis, acute,  severe;    TECHNIQUE:  Low dose axial images, sagittal and coronal reformations were obtained from the lung bases to the pubic symphysis following the IV administration of 75 mL of Omnipaque 350 .  Oral contrast was not administered.    COMPARISON:  11/03/2023    FINDINGS:  Abdomen:    - Lower thorax:    - Lung bases: Small bibasilar pleural effusions with mild associated atelectasis.    - Liver: The liver is enlarged.  No focal abnormality.    - Gallbladder: No calcified gallstones.    - Bile Ducts: No evidence of intra or extra hepatic biliary ductal dilation.    - Spleen: Spleen is enlarged.  No focal abnormality.    Probable chronic splenic vein thrombosis with multiple collateral vessels adjacent to the spleen and stomach similar to prior studies..    - Kidneys: No stone, mass or hydronephrosis.    - Adrenals: Unremarkable.    - Pancreas: Large lobulated peripancreatic fluid collection.  There are 3 connecting components measuring 6.8 x 6.2 cm on axial 70, 8.4 x 3.7 cm on axial 50 and 2.3 x 3.8 cm on axial 36.  Mild mass effect on the IVC from the smallest component on axial 36, similar to prior studies.  This could represent a large pancreatic could pseudocyst..  This has mildly increased in size from the prior study.  Pancreatic inflammatory changes have decreased.  History of pancreatitis.    - Retroperitoneum:  No significant adenopathy.    - Vascular: No abdominal aortic aneurysm.  Retroaortic left renal vein incidentally noted.    - Abdominal wall:  Small fat containing periumbilical hernia.    Pelvis:    Mild diffuse wall thickening of the urinary bladder could represent cystitis.  No focal abnormality.    Bowel/Mesentery:    No evidence of bowel obstruction.  Moderate retained feces in the colon.    Mild free fluid in the pelvis.    Stable mild dilation of the appendix to approximately 9 mm with mild wall thickening and mild adjacent stranding.  This is similar to the prior study.  Mild acute  appendicitis is a consideration.    Bones:  No acute osseous abnormality and no suspicious lytic or blastic lesion.                                       X-Ray Chest PA And Lateral (Final result)  Result time 11/14/23 20:38:57      Final result by Nimesh Stockton MD (11/14/23 20:38:57)                   Impression:      Overall improved compared to prior.  On the lateral view, trace residual effusions remains in the posterior sulci and subsegmental atelectatic change remains in lower lobes.      Electronically signed by: Nimesh Stockton MD  Date:    11/14/2023  Time:    20:38               Narrative:    EXAMINATION:  XR CHEST PA AND LATERAL    CLINICAL HISTORY:  Unspecified abdominal pain    TECHNIQUE:  PA and lateral views of the chest were performed.    COMPARISON:  11/05/2023.    FINDINGS:  On the lateral view, trace residual effusions remains in the posterior sulci and subsegmental atelectatic change remains in lower lobes.    There is no consolidation or pneumothorax.    Cardiomediastinal silhouette is unremarkable.    Regional osseous structures are similar to prior.                                      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 Recently discharged 6 days ago after 18 day admission for necrotizing pancreatitis. Back for pain control. Lipase 398 today, highest in recent history. CT with large lobulated peripancreatic fluid collection could represent a large pancreatic pseudocyst, mildly increased in size from the prior study. Lactate WNL.    [place] [facility] [place] chronic necrotizing pancreatitis with associated fluid collection - pseudocyst with possible infection; and recommended stopping with close monitoring off antibiotics since patient has been on abx for past 7 weeks. May need  re-evaluation with repeat aspiration when feasible. Continue biktarvy for HIV infection, pt follows at [facility]. [place] [facility] [place] management until fluid collection matures. Outpatient EUS has been scheduled for 11/21. FU has already been set up, so will treat patient this admission for nausea and pain control and supportive care.    Plan:  - Will not continue Meropenem per ID recommendations, patient vitally stable and has no indication of sepsis  - Consider removing midline this admission  - Delaudid 2mg PO q 6 prn for severe pain, oxycodone 5 for moderate pain, .5mg iv delaudid for breakthrough after orals taken  - suboxone 8-2mg  - Robaxin 750 q6  - promethazine 25 mg q4  - continue creon  - NPO  - Gentle fluids at 75cc/hr    Alcohol use disorder  Hx of EtOH use disorder. On most recent admission, patient's ethanol negative in setting of pancreatitis flare.   - Off CIWA precautions      HIV infection  Continue Biktarvy        VTE Risk Mitigation (From admission, onward)      None               Galen Webster MD  Department of Hospital Medicine  [facility] - Emergency Dept

## 2023-11-15 NOTE — ASSESSMENT & PLAN NOTE
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 Recently discharged 6 days ago after 18 day admission for necrotizing pancreatitis. Back for pain control. Lipase 398 today, highest in recent history. CT with large lobulated peripancreatic fluid collection could represent a large pancreatic pseudocyst, mildly increased in size from the prior study. Lactate WNL.    [place] [facility] [place] for acute on chronic necrotizing pancreatitis with associated fluid collection - pseudocyst with possible infection; and recommended stopping with close monitoring off antibiotics since patient has been on abx for past 7 weeks. May need re-evaluation with repeat aspiration when feasible. Continue biktarvy for HIV infection, pt follows at [facility]. [place] [facility] [place] and were recommending conservative management until fluid collection matures. Outpatient EUS has been scheduled for 11/21. FU has already been set up, so will treat patient this admission for nausea and pain control and supportive care.    Plan:  - Will not continue Meropenem per ID recommendations, patient vitally stable and has no indication of sepsis  - Consider removing midline this admission  - Delaudid 2mg PO q 6 prn for severe pain, oxycodone 5 for moderate pain, .5mg iv delaudid for breakthrough after orals taken  - suboxone 8-2mg  - Robaxin 750 q6  - promethazine 25 mg q4  - continue creon  - NPO  - Gentle fluids at 75cc/hr

## 2023-11-15 NOTE — HPI
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 Recently discharged 6 days ago after 18 day admission for necrotizing pancreatitis. Unable to tolerate pain meds at home due to vomiting. He went to [facility] 2 days ago for the pain because he said it was a closer ED. He said they wanted to discontinue Meropenem and he requested to be discharged. He said they were only giving him oxycodone which was not controlling it as well and dilaudid works better.     ED: Lipase 398 today, highest in recent history. CT with large lobulated peripancreatic fluid collection could represent a large pancreatic pseudocyst, mildly increased in size from the prior study - Pancreatic inflammatory changes have decreased. Vomited x1 since ED arrival. Given Dilaudid 1g x1 and 2g x1. 250cc's NS given.

## 2023-11-15 NOTE — FIRST PROVIDER EVALUATION
Emergency Department TeleTriage Encounter Note      CHIEF COMPLAINT    Chief Complaint   Patient presents with    Abdominal Pain     Admitted last week with pancreatitis, on iv antibiotics       VITAL SIGNS   Initial Vitals [11/14/23 1753]   BP Pulse Resp Temp SpO2   132/86 96 16 98.9 °F (37.2 °C) 98 %      MAP       --            ALLERGIES    Review of patient's allergies indicates:   Allergen Reactions    [place] Swelling    Pcn [penicillins] Hives     Tolerates cephalosporins    Pecan nut Swelling    Sulfa (sulfonamide antibiotics) Hives       PROVIDER TRIAGE NOTE  Verbal consent for the teletriage evaluation was given by the patient at the start of the evaluation.  All efforts will be made to maintain patient's privacy during the evaluation.      This is a teletriage evaluation of a 23 y.o. male presenting to the ED with c/o upper abdominal pain and vomiting.  Recently diagnosed with pancreatitis and is on IV ABX.  No meds taken today. Limited physical exam via telehealth: The patient is awake, alert, answering questions appropriately and is not in respiratory distress.  As the Teletriage provider, I performed an initial assessment and ordered appropriate labs and imaging studies, if any, to facilitate the patient's care once placed in the ED. Once a room is available, care and a full evaluation will be completed by an alternate ED provider.  Any additional orders and the final disposition will be determined by that provider.  All imaging and labs will not be followed-up by the Teletriage Team, including myself.          ORDERS  Labs Reviewed   CBC W/ AUTO DIFFERENTIAL   COMPREHENSIVE METABOLIC PANEL   LIPASE   URINALYSIS, REFLEX TO URINE CULTURE       ED Orders (720h ago, onward)      Start Ordered     Status Ordering Provider    11/14/23 1815 11/14/23 1814  Vital signs  Every 2 hours         Ordered MORRIS NGUYEN    11/14/23 1815 11/14/23 1814  Diet NPO  Diet effective now         Ordered MORRIS NGUYEN     11/14/23 1815 11/14/23 1814  Insert peripheral IV  Once         Ordered MORRIS NGUYEN    11/14/23 1815 11/14/23 1814  CBC W/ AUTO DIFFERENTIAL  STAT         Ordered MORRIS NGUYEN    11/14/23 1815 11/14/23 1814  Comp. Metabolic Panel  STAT         Ordered MORRIS NGUYEN    11/14/23 1815 11/14/23 1814  Lipase  STAT         Ordered MORRIS NGUYEN    11/14/23 1815 11/14/23 1814  Urinalysis, Reflex to Urine Culture Urine, Clean Catch  STAT         Ordered MORRIS NGUYEN              Virtual Visit Note: The provider triage portion of this emergency department evaluation and documentation was performed via [employer], a HIPAA-compliant telemedicine application, in concert with a tele-presenter in the room. A face to face patient evaluation with one of my colleagues will occur once the patient is placed in an emergency department room.      DISCLAIMER: This note was prepared with [employer] voice recognition transcription software. Garbled syntax, mangled pronouns, and other bizarre constructions may be attributed to that software system.

## 2023-11-15 NOTE — PLAN OF CARE
"[facility] Phone Call    Eren Orellana MD.  "Hey we cannot do a cystgastrostomy until eliquis is out of his system for 5 days. There is currently no urgent indication for drainage that I can see from chart review (mass effect causing bowel obstruction, infected collection). "  "

## 2023-11-15 NOTE — NURSING
New admit report received from Olive HEWITT RN @00:49. Room prepared awaiting patient arrival.    01:18  N    Update: Patient arrived to the unit via Wheelchair with transport. Currently running NS @75mL/h and room air   VS and H2T exam completed and recorded in Epic. Admissions navigator completed. NAD noted. Fall/Safety precautions maintained. Call light and personal items within reach. Communication board updated.    Nurses Note -- 4 Eyes      11/15/2023   5:50 AM      Skin assessed during: Admit      [x] No Altered Skin Integrity Present    []Prevention Measures Documented      [] Yes- Altered Skin Integrity Present or Discovered   [] LDA Added if Not in Epic (Describe Wound)   [] New Altered Skin Integrity was Present on Admit and Documented in LDA   [] Wound Image Taken    Wound Care Consulted? No    Attending Nurse:  Caty    Altered skin  documented in Epic upon arrival

## 2023-11-15 NOTE — ED PROVIDER NOTES
Encounter Date: 11/14/2023       History     Chief Complaint   Patient presents with    Abdominal Pain     Admitted last week with pancreatitis, on iv antibiotics     The history is provided by the patient and medical records. No  was used.     Tasia Cardona is a 23 y.o. 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     Discharged 5 days ago after 18 day admission for necrotizing pancreatitis and pain control. Reports LUQ pain returned 2 days ago with associated nausea and vomiting. Unrelieved with his home promethazine and dilaudid. Last vomited prior to ED visit. He was seen at [facility] 2 days ago as it was closer to him. Reports he requested transfer to [facility], but was ultimately discharged with plans of outpatient EUS. He is currently on Meropenem via PICC line and compliant with his dosing. Reports having subjective fever and chills. No documented fever. Last BM yesterday and normal. Reports eating a grilled cheese sandwich prior to his symptoms starting. Reports last ETOH use was several months ago.       Review of patient's allergies indicates:   Allergen Reactions    Pateros Swelling    Pcn [penicillins] Hives     Tolerates cephalosporins    Pecan nut Swelling    Sulfa (sulfonamide antibiotics) Hives     Past Medical History:   Diagnosis Date    Acute necrotizing pancreatitis 09/20/2023    Alcohol use disorder 10/05/2023    Asthma     Hepatitis C antibody positive in blood 09/18/2023 9/2023 PCR negative    HIV infection 10/2021    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     Past Surgical History:   Procedure Laterality Date    ENDOSCOPIC  ULTRASOUND OF UPPER GASTROINTESTINAL TRACT N/A 10/23/2023    Procedure: ULTRASOUND, UPPER GI TRACT, ENDOSCOPIC;  Surgeon: Emil Villatoro MD;  Location: [facility] (2ND FLR);  Service: Endoscopy;  Laterality: N/A;    ERCP N/A 10/23/2023    Procedure: ERCP (ENDOSCOPIC RETROGRADE CHOLANGIOPANCREATOGRAPHY);  Surgeon: Emil Villatoro MD;  Location: [facility] (2ND FLR);  Service: Endoscopy;  Laterality: N/A;    ESOPHAGOGASTRODUODENOSCOPY N/A 9/18/2023    Procedure: EGD (ESOPHAGOGASTRODUODENOSCOPY);  Surgeon: Kg Cleaning MD;  Location: [facility] (2ND FLR);  Service: Endoscopy;  Laterality: N/A;     Family History   Problem Relation Age of Onset    Pancreatitis Mother     Cancer Mother     Ovarian cancer Mother     No Known Problems Father     Cancer Brother     Breast cancer Maternal Grandmother      Social History     Tobacco Use    Smoking status: Former     Types: Cigarettes    Smokeless tobacco: Never   Substance Use Topics    Alcohol use: Not Currently    Drug use: Never     Review of Systems   Gastrointestinal:  Positive for abdominal pain, nausea and vomiting.       Physical Exam     Initial Vitals [11/14/23 1753]   BP Pulse Resp Temp SpO2   132/86 96 16 98.9 °F (37.2 °C) 98 %      MAP       --         Physical Exam    Constitutional: He appears well-developed and well-nourished. He is not diaphoretic. He is easily aroused.   HENT:   Head: Normocephalic and atraumatic.   Mouth/Throat: Oropharynx is clear and moist. No oropharyngeal exudate.   Eyes: EOM and lids are normal. Pupils are equal, round, and reactive to light. No scleral icterus.   Neck: Phonation normal. Neck supple. No stridor present.   Normal range of motion.  Cardiovascular:  Normal rate and regular rhythm.           PICC line RUE   Pulmonary/Chest: Breath sounds normal. No stridor. No respiratory distress. He has no wheezes. He has no rales.   Abdominal: Abdomen is soft. He exhibits no distension. There is abdominal tenderness (LUQ, LMQ). There  is no rebound.   Musculoskeletal:         General: No tenderness or edema. Normal range of motion.      Cervical back: Normal range of motion and neck supple.     Neurological: He is alert, oriented to person, place, and time and easily aroused. He has normal strength. No sensory deficit.   Skin: Skin is warm and dry. No rash noted. No erythema.   Psychiatric: He has a normal mood and affect. His speech is normal.         ED Course   Procedures  Labs Reviewed   CBC W/ AUTO DIFFERENTIAL - Abnormal; Notable for the following components:       Result Value    RBC 3.45 (*)     Hemoglobin 9.2 (*)     Hematocrit 29.5 (*)     MCH 26.7 (*)     MCHC 31.2 (*)     RDW 19.0 (*)     Platelets 521 (*)     Gran % 35.8 (*)     All other components within normal limits   LIPASE - Abnormal; Notable for the following components:    Lipase 398 (*)     All other components within normal limits   ISTAT PROCEDURE - Abnormal; Notable for the following components:    POC BUN 3 (*)     POC Hematocrit 28 (*)     All other components within normal limits   CULTURE, BLOOD   CULTURE, BLOOD   LACTIC ACID, PLASMA   COMPREHENSIVE METABOLIC PANEL   URINALYSIS, REFLEX TO URINE CULTURE   ISTAT CHEM8          Imaging Results               CT Abdomen Pelvis With IV Contrast (Final result)  Result time 11/14/23 21:32:23      Final result by Wilver Henry MD (11/14/23 21:32:23)                   Impression:      1. History of pancreatitis with large lobulated peripancreatic fluid collection could represent a large pancreatic pseudocyst, mildly increased in size from the prior study.  See above comments.  Pancreatic inflammatory changes have decreased.  See above comments.  Recommend clinical correlation and follow-up.  2. Small bibasilar pleural effusions with mild associated atelectasis.  3. Hepatosplenomegaly with chronic splenic vein thrombosis with multiple collateral vessels noted similar to prior studies.  4. Mild diffuse urinary bladder wall  thickening could represent cystitis.  Recommend clinical correlation and follow-up.  5. Stable mild dilation of the appendix to approximately 9 mm with mild wall thickening and mild adjacent stranding.  This is similar to the prior study.  Mild acute appendicitis is not excluded.  Recommend clinical correlation and follow-up.  6. Possible constipation.  7. Mild nonspecific free fluid in the pelvis.  8. This report was flagged in Epic as abnormal.      Electronically signed by: Wilver Lombardo  Date:    11/14/2023  Time:    21:32               Narrative:    EXAMINATION:  CT ABDOMEN PELVIS WITH IV CONTRAST    CLINICAL HISTORY:  Pancreatitis, acute, severe;    TECHNIQUE:  Low dose axial images, sagittal and coronal reformations were obtained from the lung bases to the pubic symphysis following the IV administration of 75 mL of Omnipaque 350 .  Oral contrast was not administered.    COMPARISON:  11/03/2023    FINDINGS:  Abdomen:    - Lower thorax:    - Lung bases: Small bibasilar pleural effusions with mild associated atelectasis.    - Liver: The liver is enlarged.  No focal abnormality.    - Gallbladder: No calcified gallstones.    - Bile Ducts: No evidence of intra or extra hepatic biliary ductal dilation.    - Spleen: Spleen is enlarged.  No focal abnormality.    Probable chronic splenic vein thrombosis with multiple collateral vessels adjacent to the spleen and stomach similar to prior studies..    - Kidneys: No stone, mass or hydronephrosis.    - Adrenals: Unremarkable.    - Pancreas: Large lobulated peripancreatic fluid collection.  There are 3 connecting components measuring 6.8 x 6.2 cm on axial 70, 8.4 x 3.7 cm on axial 50 and 2.3 x 3.8 cm on axial 36.  Mild mass effect on the IVC from the smallest component on axial 36, similar to prior studies.  This could represent a large pancreatic could pseudocyst..  This has mildly increased in size from the prior study.  Pancreatic inflammatory changes have decreased.   History of pancreatitis.    - Retroperitoneum:  No significant adenopathy.    - Vascular: No abdominal aortic aneurysm.  Retroaortic left renal vein incidentally noted.    - Abdominal wall:  Small fat containing periumbilical hernia.    Pelvis:    Mild diffuse wall thickening of the urinary bladder could represent cystitis.  No focal abnormality.    Bowel/Mesentery:    No evidence of bowel obstruction.  Moderate retained feces in the colon.    Mild free fluid in the pelvis.    Stable mild dilation of the appendix to approximately 9 mm with mild wall thickening and mild adjacent stranding.  This is similar to the prior study.  Mild acute appendicitis is a consideration.    Bones:  No acute osseous abnormality and no suspicious lytic or blastic lesion.                                       X-Ray Chest PA And Lateral (Final result)  Result time 11/14/23 20:38:57      Final result by Nimesh Stockton MD (11/14/23 20:38:57)                   Impression:      Overall improved compared to prior.  On the lateral view, trace residual effusions remains in the posterior sulci and subsegmental atelectatic change remains in lower lobes.      Electronically signed by: Nimesh Stockton MD  Date:    11/14/2023  Time:    20:38               Narrative:    EXAMINATION:  XR CHEST PA AND LATERAL    CLINICAL HISTORY:  Unspecified abdominal pain    TECHNIQUE:  PA and lateral views of the chest were performed.    COMPARISON:  11/05/2023.    FINDINGS:  On the lateral view, trace residual effusions remains in the posterior sulci and subsegmental atelectatic change remains in lower lobes.    There is no consolidation or pneumothorax.    Cardiomediastinal silhouette is unremarkable.    Regional osseous structures are similar to prior.                                       Medications   HYDROmorphone injection 2 mg (2 mg Intravenous Given 11/14/23 1952)   prochlorperazine injection Soln 5 mg (5 mg Intravenous Given 11/14/23 1952)   sodium  chloride 0.9% bolus 250 mL 250 mL (0 mLs Intravenous Stopped 11/14/23 2138)   iohexoL (OMNIPAQUE 350) injection 75 mL (75 mLs Intravenous Given 11/14/23 2101)   HYDROmorphone injection 1 mg (1 mg Intravenous Given 11/14/23 2219)     Medical Decision Making  23 y.o. 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 Discharged 5 days ago after 18 day admission for necrotizing pancreatitis and pain control. Seen at [facility] ED 2 days ago.     DDx includes but not limited to necrotizing pancreatitis, intra-abdominal abscess, dehydration, electrolyte disturbance, CHARY, bacteremia    Amount and/or Complexity of Data Reviewed  External Data Reviewed: labs, radiology and notes.  Labs: ordered. Decision-making details documented in ED Course.  Radiology: ordered and independent interpretation performed. Decision-making details documented in ED Course.  Discussion of management or test interpretation with external provider(s): HM regarding admission    Risk  Prescription drug management.  Decision regarding hospitalization.               ED Course as of 11/14/23 2221 Tue Nov 14, 2023 2118 Patient did have a CT at [facility] 2 days ago but images are not available at [facility]. Additionally CT was not compared to his most recent previous. Will obtain repeat CT scan today given his presenting symptoms.  [BA]   2119 Lipase(!): 398  Above baseline [BA]   2119 WBC: 5.80  Normal [BA]   2220 CT showing large lobulated peripancreatic fluid collection could represent a large pancreatic pseudocyst, mildly increased in size from the prior study. Inflammatory changes have decreased.   [BA]   2220 Discussed with HM, placed in observation for ongoing pain control and IVF. I have discussed the care of this patient with my supervising physician.  [BA]      ED Course User  Index  [BA] Dakotah Lam PA-C                          Clinical Impression:   Final diagnoses:  [R10.9] Abdominal pain  [K85.91] Necrotizing pancreatitis (Primary)        ED Disposition Condition    Observation Stable                Dakotah Lam PA-C  11/14/23 6430

## 2023-11-16 DIAGNOSIS — Z21 ASYMPTOMATIC HIV INFECTION, WITH NO HISTORY OF HIV-RELATED ILLNESS: ICD-10-CM

## 2023-11-16 RX ORDER — BICTEGRAVIR SODIUM, EMTRICITABINE, AND TENOFOVIR ALAFENAMIDE FUMARATE 50; 200; 25 MG/1; MG/1; MG/1
1 TABLET ORAL DAILY
Qty: 30 TABLET | Refills: 11 | Status: ON HOLD | OUTPATIENT
Start: 2023-11-16 | End: 2023-12-28 | Stop reason: SDUPTHER

## 2023-11-16 NOTE — HOSPITAL COURSE
Patient admitted to [facility] for chronic pancreatitis with pancreatic fluid collection slightly increased on CT. Patient recently admitted to [facility] and was given a follow up with GI for Outpatient EUS that has been scheduled for 11/21. He was admitted to hospital medicine for fluids and pain control. AES was consulted and did not have emergent indication for drainage. Patient requesting to be discharged. Patient will be discharged with follow up to [facility] GI on 11/21 for EUS and multimodal pain medications.

## 2023-11-16 NOTE — DISCHARGE INSTRUCTIONS
Please follow up with [facility] gastroenterology on 11/21 for procedure.  Removed Midline catheter. Pt is aware of follow up appointment

## 2023-11-16 NOTE — DISCHARGE SUMMARY
[facility] - Surgery  Hospital Medicine  Discharge Summary      Patient Name: Tasia Cardona  MRN: [id number]  HEATHER: [id number]  Patient Class: IP- Inpatient  Admission Date: 11/14/2023  Hospital Length of Stay: 0 days  Discharge Date and Time:  11/15/2023 7:32 PM  Attending Physician: Osmin Dutta MD   Discharging Provider: Galen Webster MD  Primary Care Provider: Pina Jones NP  Hospital Medicine Team: [facility] HOSP MED 4 Galen Webster MD  Primary Care Team: [facility] HOSP MED 4    HPI:   23M with well controlled HIV, EtOH use, acute necrotizing pancreatitis, 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 Recently discharged 6 days ago after 18 day admission for necrotizing pancreatitis. Unable to tolerate pain meds at home due to vomiting. He went to [facility] 2 days ago for the pain because he said it was a closer ED. He said they wanted to discontinue Meropenem and he requested to be discharged. He said they were only giving him oxycodone which was not controlling it as well and dilaudid works better.     ED: Lipase 398 today, highest in recent history. CT with large lobulated peripancreatic fluid collection could represent a large pancreatic pseudocyst, mildly increased in size from the prior study - Pancreatic inflammatory changes have decreased. Vomited x1 since ED arrival. Given Dilaudid 1g x1 and 2g x1. 250cc's NS given.      * No surgery found *      Hospital Course:   Patient admitted to [facility] for chronic pancreatitis with pancreatic fluid collection slightly increased on CT. Patient recently admitted to [facility] and was given a follow up with GI for Outpatient EUS that has been scheduled for 11/21. He was admitted to hospital medicine for fluids and pain control. AES was consulted and did not have emergent indication for drainage. Patient requesting to be discharged. Patient will be discharged with follow up to [facility] GI on 11/21 for EUS and multimodal pain  medications.     Goals of Care Treatment Preferences:  Code Status: Full Code      Consults:         Final Active Diagnoses:    Diagnosis Date Noted POA    PRINCIPAL PROBLEM:  Chronic pancreatitis [K86.1] 09/20/2023 Yes    Bilateral pleural effusion [J90] 11/04/2023 Yes    Alcohol use disorder [F10.90] 10/05/2023 Yes    Fluid collection of pancreas [K86.89] 09/30/2023 Yes    Splenic vein thrombosis [I82.890] 09/20/2023 Yes    Normocytic anemia [D64.9] 09/18/2023 Yes     Chronic    HIV infection [B20] 11/02/2021 Yes      Problems Resolved During this Admission:       Discharged Condition: stable    Disposition:     Follow Up:   Follow-up Information       Pina Jones NP Follow up.    Specialty: Family Medicine  Why: As needed  Contact information:  [address] [facility] [address]  [phone]               Gastroenterology, [facility] -. Go in 5 day(s).    Specialty: Gastroenterology  Why: For drainage  Contact information:  [address] [facility] [address]  [phone]                           Patient Instructions:   No discharge procedures on file.    Significant Diagnostic Studies: Labs: CMP   Recent Labs   Lab 11/14/23  2134 11/15/23  0315    141   K 3.6 3.5    107   CO2 26 23   GLU 84 83   BUN 5* 5*   CREATININE 0.7 0.6   CALCIUM 9.4 8.8   PROT 6.7 6.0   ALBUMIN 3.0* 2.7*   BILITOT 0.2 0.2   ALKPHOS 72 64   AST 15 12   ALT 8* 6*   ANIONGAP 11 11    and CBC   Recent Labs   Lab 11/14/23  1923 11/14/23  1950 11/15/23  0315   WBC 5.80  --  3.88*   HGB 9.2*  --  8.0*   HCT 29.5*   < > 26.7*   *  --  393    < > = values in this interval not displayed.       Pending Diagnostic Studies:       None           Medications:  Reconciled Home Medications:      Medication List        START taking these medications      celecoxib 200 MG capsule  Commonly known as: CeleBREX  Take 1 capsule (200 mg total) by mouth once daily.  Start taking on: November 16, 2023      oxyCODONE 5 MG immediate release tablet  Commonly known as: ROXICODONE  Take 1 tablet (5 mg total) by mouth every 4 (four) hours as needed for Pain.     pregabalin 150 MG capsule  Commonly known as: LYRICA  Take 1 capsule (150 mg total) by mouth 2 (two) times daily.            CONTINUE taking these medications      BIKTARVY -25 mg (25 kg or greater)  Generic drug: kerbxrfxf-ywtbcjba-hkaingi ala  Take 1 tablet by mouth once daily.     buprenorphine-naloxone 8-2 mg 8-2 mg  Commonly known as: SUBOXONE  Place 2 Film under the tongue once daily. for 7 days     CREON 6,000-19,000 -30,000 unit capsule  Generic drug: lipase-protease-amylase 6,000-19,000-30,000 units  Take 2 capsules by mouth 3 (three) times daily with meals.     folic acid 1 MG tablet  Commonly known as: FOLVITE  Take 1 tablet (1 mg total) by mouth once daily.     methocarbamoL 750 MG Tab  Commonly known as: ROBAXIN  Take 1 tablet (750 mg total) by mouth every 6 (six) hours. for 7 days     pantoprazole 40 MG tablet  Commonly known as: PROTONIX  Take 1 tablet (40 mg total) by mouth 2 (two) times daily.     promethazine 25 MG tablet  Commonly known as: PHENERGAN  Take 25 mg by mouth every 4 (four) hours as needed for Nausea.            STOP taking these medications      ELIQUIS 5 mg Tab  Generic drug: apixaban     HYDROmorphone 2 MG tablet  Commonly known as: DILAUDID              Indwelling Lines/Drains at time of discharge:   Lines/Drains/Airways       None                   Time spent on the discharge of patient: 45 minutes         Galen Webster MD  Department of Hospital Medicine  [facility] - Surgery

## 2023-11-16 NOTE — NURSING
Nurses Note -- 4 Eyes      11/15/2023   6:14 PM      Skin assessed during: Daily Assessment      [x] No Altered Skin Integrity Present    []Prevention Measures Documented      [] Yes- Altered Skin Integrity Present or Discovered   [] LDA Added if Not in Epic (Describe Wound)   [] New Altered Skin Integrity was Present on Admit and Documented in LDA   [] Wound Image Taken    Wound Care Consulted? No    Attending Nurse:  GEORGE Capps RN/Staff Member:   Sonya Gómez

## 2023-11-16 NOTE — PLAN OF CARE
[facility] - [facility]  Discharge Final Note    Primary Care Provider: Pina Jones NP    Expected Discharge Date: 11/15/2023    Final Discharge Note (most recent)       Final Note - 11/16/23 0740          Final Note    Assessment Type Final Discharge Note     Anticipated Discharge Disposition Home or Self Care     What phone number can be called within the next 1-3 days to see how you are doing after discharge? --   [phone]    Hospital Resources/Appts/Education Provided Provided patient/caregiver with written discharge plan information                     Important Message from Medicare             Contact Info       Pina Jones NP   Specialty: Family Medicine   Relationship: PCP - General    [address] [facility] [address]   Phone: [phone]       Next Steps: Follow up    Instructions: As needed    [facility] - Gastroenterology   Specialty: Gastroenterology    [address] [facility] [address]   Phone: [phone]       Next Steps: Go in 5 day(s)    Instructions: For drainage            Patient discharged home to care of self on 11/15/23.    Concepcion Schreiber RNCM  Case Management  [facility]  [phone]

## 2023-11-19 ENCOUNTER — HOSPITAL ENCOUNTER (EMERGENCY)
Facility: HOSPITAL | Age: 23
Discharge: HOME | End: 2023-11-20
Attending: EMERGENCY MEDICINE
Payer: MEDICAID

## 2023-11-19 DIAGNOSIS — K86.3 PANCREATIC PSEUDOCYST: ICD-10-CM

## 2023-11-19 DIAGNOSIS — K86.1 CHRONIC PANCREATITIS, UNSPECIFIED PANCREATITIS TYPE: Primary | ICD-10-CM

## 2023-11-19 LAB
ALBUMIN SERPL BCP-MCNC: 3.6 G/DL (ref 3.5–5.2)
ALP SERPL-CCNC: 97 U/L (ref 55–135)
ALT SERPL W/O P-5'-P-CCNC: 13 U/L (ref 10–44)
ANION GAP SERPL CALC-SCNC: 14 MMOL/L (ref 8–16)
AST SERPL-CCNC: 18 U/L (ref 10–40)
BACTERIA BLD CULT: NORMAL
BACTERIA BLD CULT: NORMAL
BASOPHILS # BLD AUTO: 0.07 K/UL (ref 0–0.2)
BASOPHILS NFR BLD: 0.9 % (ref 0–1.9)
BILIRUB SERPL-MCNC: 0.4 MG/DL (ref 0.1–1)
BUN SERPL-MCNC: 3 MG/DL (ref 6–30)
BUN SERPL-MCNC: 5 MG/DL (ref 6–20)
CALCIUM SERPL-MCNC: 9.1 MG/DL (ref 8.7–10.5)
CHLORIDE SERPL-SCNC: 102 MMOL/L (ref 95–110)
CHLORIDE SERPL-SCNC: 106 MMOL/L (ref 95–110)
CO2 SERPL-SCNC: 23 MMOL/L (ref 23–29)
CREAT SERPL-MCNC: 0.6 MG/DL (ref 0.5–1.4)
CREAT SERPL-MCNC: 0.7 MG/DL (ref 0.5–1.4)
DIFFERENTIAL METHOD: ABNORMAL
EOSINOPHIL # BLD AUTO: 0.3 K/UL (ref 0–0.5)
EOSINOPHIL NFR BLD: 4 % (ref 0–8)
ERYTHROCYTE [DISTWIDTH] IN BLOOD BY AUTOMATED COUNT: 18.9 % (ref 11.5–14.5)
EST. GFR  (NO RACE VARIABLE): >60 ML/MIN/1.73 M^2
GLUCOSE SERPL-MCNC: 90 MG/DL (ref 70–110)
GLUCOSE SERPL-MCNC: 93 MG/DL (ref 70–110)
HCT VFR BLD AUTO: 34.8 % (ref 40–54)
HCT VFR BLD CALC: 35 %PCV (ref 36–54)
HGB BLD-MCNC: 10.6 G/DL (ref 14–18)
IMM GRANULOCYTES # BLD AUTO: 0.01 K/UL (ref 0–0.04)
IMM GRANULOCYTES NFR BLD AUTO: 0.1 % (ref 0–0.5)
LIPASE SERPL-CCNC: 192 U/L (ref 4–60)
LYMPHOCYTES # BLD AUTO: 3.5 K/UL (ref 1–4.8)
LYMPHOCYTES NFR BLD: 42 % (ref 18–48)
MCH RBC QN AUTO: 26 PG (ref 27–31)
MCHC RBC AUTO-ENTMCNC: 30.5 G/DL (ref 32–36)
MCV RBC AUTO: 85 FL (ref 82–98)
MONOCYTES # BLD AUTO: 0.5 K/UL (ref 0.3–1)
MONOCYTES NFR BLD: 5.8 % (ref 4–15)
NEUTROPHILS # BLD AUTO: 3.9 K/UL (ref 1.8–7.7)
NEUTROPHILS NFR BLD: 47.2 % (ref 38–73)
NRBC BLD-RTO: 0 /100 WBC
PLATELET # BLD AUTO: 430 K/UL (ref 150–450)
PMV BLD AUTO: 9.6 FL (ref 9.2–12.9)
POC IONIZED CALCIUM: 1.12 MMOL/L (ref 1.06–1.42)
POC TCO2 (MEASURED): 25 MMOL/L (ref 23–29)
POTASSIUM BLD-SCNC: 3.1 MMOL/L (ref 3.5–5.1)
POTASSIUM SERPL-SCNC: 3.2 MMOL/L (ref 3.5–5.1)
PROT SERPL-MCNC: 7.7 G/DL (ref 6–8.4)
RBC # BLD AUTO: 4.08 M/UL (ref 4.6–6.2)
SAMPLE: ABNORMAL
SODIUM BLD-SCNC: 142 MMOL/L (ref 136–145)
SODIUM SERPL-SCNC: 143 MMOL/L (ref 136–145)
WBC # BLD AUTO: 8.23 K/UL (ref 3.9–12.7)

## 2023-11-19 PROCEDURE — 99285 EMERGENCY DEPT VISIT HI MDM: CPT | Mod: 25

## 2023-11-19 PROCEDURE — 96374 THER/PROPH/DIAG INJ IV PUSH: CPT

## 2023-11-19 PROCEDURE — 80048 BASIC METABOLIC PNL TOTAL CA: CPT | Mod: XB

## 2023-11-19 PROCEDURE — 96375 TX/PRO/DX INJ NEW DRUG ADDON: CPT

## 2023-11-19 PROCEDURE — 96376 TX/PRO/DX INJ SAME DRUG ADON: CPT

## 2023-11-19 PROCEDURE — 85025 COMPLETE CBC W/AUTO DIFF WBC: CPT | Performed by: EMERGENCY MEDICINE

## 2023-11-19 PROCEDURE — 25000003 PHARM REV CODE 250: Performed by: EMERGENCY MEDICINE

## 2023-11-19 PROCEDURE — 63600175 PHARM REV CODE 636 W HCPCS: Performed by: EMERGENCY MEDICINE

## 2023-11-19 PROCEDURE — 96361 HYDRATE IV INFUSION ADD-ON: CPT

## 2023-11-19 PROCEDURE — 80053 COMPREHEN METABOLIC PANEL: CPT | Performed by: EMERGENCY MEDICINE

## 2023-11-19 PROCEDURE — 83690 ASSAY OF LIPASE: CPT | Performed by: EMERGENCY MEDICINE

## 2023-11-19 RX ORDER — ONDANSETRON 2 MG/ML
4 INJECTION INTRAMUSCULAR; INTRAVENOUS
Status: COMPLETED | OUTPATIENT
Start: 2023-11-19 | End: 2023-11-19

## 2023-11-19 RX ORDER — HYDROMORPHONE HYDROCHLORIDE 1 MG/ML
1 INJECTION, SOLUTION INTRAMUSCULAR; INTRAVENOUS; SUBCUTANEOUS
Status: COMPLETED | OUTPATIENT
Start: 2023-11-19 | End: 2023-11-19

## 2023-11-19 RX ADMIN — HYDROMORPHONE HYDROCHLORIDE 1 MG: 1 INJECTION, SOLUTION INTRAMUSCULAR; INTRAVENOUS; SUBCUTANEOUS at 11:11

## 2023-11-19 RX ADMIN — HYDROMORPHONE HYDROCHLORIDE 1 MG: 1 INJECTION, SOLUTION INTRAMUSCULAR; INTRAVENOUS; SUBCUTANEOUS at 09:11

## 2023-11-19 RX ADMIN — POTASSIUM BICARBONATE 20 MEQ: 391 TABLET, EFFERVESCENT ORAL at 11:11

## 2023-11-19 RX ADMIN — ONDANSETRON 4 MG: 2 INJECTION INTRAMUSCULAR; INTRAVENOUS at 09:11

## 2023-11-19 RX ADMIN — SODIUM CHLORIDE, POTASSIUM CHLORIDE, SODIUM LACTATE AND CALCIUM CHLORIDE 1000 ML: 600; 310; 30; 20 INJECTION, SOLUTION INTRAVENOUS at 10:11

## 2023-11-20 VITALS
TEMPERATURE: 98 F | RESPIRATION RATE: 17 BRPM | OXYGEN SATURATION: 99 % | DIASTOLIC BLOOD PRESSURE: 74 MMHG | HEART RATE: 80 BPM | BODY MASS INDEX: 22.1 KG/M2 | SYSTOLIC BLOOD PRESSURE: 116 MMHG | WEIGHT: 154 LBS

## 2023-11-20 PROCEDURE — 25500020 PHARM REV CODE 255: Performed by: EMERGENCY MEDICINE

## 2023-11-20 PROCEDURE — 63600175 PHARM REV CODE 636 W HCPCS: Performed by: EMERGENCY MEDICINE

## 2023-11-20 RX ORDER — HYDROMORPHONE HYDROCHLORIDE 1 MG/ML
1 INJECTION, SOLUTION INTRAMUSCULAR; INTRAVENOUS; SUBCUTANEOUS
Status: COMPLETED | OUTPATIENT
Start: 2023-11-20 | End: 2023-11-20

## 2023-11-20 RX ORDER — ONDANSETRON 4 MG/1
8 TABLET, ORALLY DISINTEGRATING ORAL EVERY 6 HOURS PRN
Qty: 30 TABLET | Refills: 0 | Status: SHIPPED | OUTPATIENT
Start: 2023-11-20 | End: 2023-11-29

## 2023-11-20 RX ORDER — ONDANSETRON 2 MG/ML
4 INJECTION INTRAMUSCULAR; INTRAVENOUS
Status: COMPLETED | OUTPATIENT
Start: 2023-11-20 | End: 2023-11-20

## 2023-11-20 RX ORDER — ONDANSETRON HYDROCHLORIDE 4 MG/5ML
4 SOLUTION ORAL ONCE
Status: DISCONTINUED | OUTPATIENT
Start: 2023-11-20 | End: 2023-11-20

## 2023-11-20 RX ORDER — DROPERIDOL 2.5 MG/ML
1.25 INJECTION, SOLUTION INTRAMUSCULAR; INTRAVENOUS ONCE
Status: COMPLETED | OUTPATIENT
Start: 2023-11-20 | End: 2023-11-20

## 2023-11-20 RX ADMIN — HYDROMORPHONE HYDROCHLORIDE 1 MG: 1 INJECTION, SOLUTION INTRAMUSCULAR; INTRAVENOUS; SUBCUTANEOUS at 05:11

## 2023-11-20 RX ADMIN — DROPERIDOL 1.25 MG: 2.5 INJECTION, SOLUTION INTRAMUSCULAR; INTRAVENOUS at 03:11

## 2023-11-20 RX ADMIN — HYDROMORPHONE HYDROCHLORIDE 1 MG: 1 INJECTION, SOLUTION INTRAMUSCULAR; INTRAVENOUS; SUBCUTANEOUS at 02:11

## 2023-11-20 RX ADMIN — IOHEXOL 75 ML: 350 INJECTION, SOLUTION INTRAVENOUS at 01:11

## 2023-11-20 RX ADMIN — ONDANSETRON 4 MG: 2 INJECTION INTRAMUSCULAR; INTRAVENOUS at 02:11

## 2023-11-20 NOTE — ED NOTES
I-STAT Chem-8+ Results:   Value Reference Range   Sodium 142 136-145 mmol/L   Potassium  3.1 3.5-5.1 mmol/L   Chloride 102  mmol/L   Ionized Calcium 1.12 1.06-1.42 mmol/L   CO2 (measured) 25 23-29 mmol/L   Glucose 93  mg/dL   BUN 3 6-30 mg/dL   Creatinine 0.6 0.5-1.4 mg/dL   Hematocrit 35 36-54%

## 2023-11-20 NOTE — ED TRIAGE NOTES
Tasia Cardona, an 23 y.o. male presents to the ED with complaints of LUQ pain and throwing up blood since this morning. + Nausea.       Chief Complaint   Patient presents with    Abdominal Pain     LUQ pain since this morning. Hx pancreatitis. Multiple episodes of vomiting     Review of patient's allergies indicates:   Allergen Reactions    Drew Swelling    Pcn [penicillins] Hives     Tolerates cephalosporins    Pecan nut Swelling    Sulfa (sulfonamide antibiotics) Hives     Past Medical History:   Diagnosis Date    Acute necrotizing pancreatitis 09/20/2023    Alcohol use disorder 10/05/2023    Asthma     Hepatitis C antibody positive in blood 09/18/2023 9/2023 PCR negative    HIV infection 10/2021    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

## 2023-11-20 NOTE — ED PROVIDER NOTES
Encounter Date: 11/19/2023       History     Chief Complaint   Patient presents with    Abdominal Pain     LUQ pain since this morning. Hx pancreatitis. Multiple episodes of vomiting     24 yo M with history necrotizing pancreatitis, pancreatic pseudocyst, hep C, HIV, Corinne-Chauhan tear, normocytic anemia.      Patient was recently seen and admitted for acute on chronic pancreatitis.  He presents today with intractable nausea vomiting that began yesterday.  As he began vomiting more, he experienced 1 episode of hematemesis, his most recent episodes have not been bloody.  This is exacerbated his abdominal pain.  He denies fever chills.  He denies melena hematochezia.  He reports compliance with his home medications.      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     Past Surgical History:   Procedure Laterality Date    ENDOSCOPIC ULTRASOUND OF UPPER GASTROINTESTINAL TRACT N/A 10/23/2023    Procedure: ULTRASOUND, UPPER GI TRACT, ENDOSCOPIC;  Surgeon: Emil Villatoro MD;  Location: [address]);  Service: Endoscopy;  Laterality: N/A;    ERCP N/A 10/23/2023    Procedure: ERCP (ENDOSCOPIC RETROGRADE CHOLANGIOPANCREATOGRAPHY);  Surgeon: Emil Villatoro MD;  Location: [address]);  Service: Endoscopy;  Laterality: N/A;    ESOPHAGOGASTRODUODENOSCOPY N/A 9/18/2023    Procedure:  EGD (ESOPHAGOGASTRODUODENOSCOPY);  Surgeon: Kg Cleaning MD;  Location: [facility] ([address] [place] [address]);  Service: Endoscopy;  Laterality: N/A;     Family History   Problem Relation Age of Onset    Pancreatitis Mother     Cancer Mother     Ovarian cancer Mother     No Known Problems Father     Cancer Brother     Breast cancer Maternal Grandmother      Social History     Tobacco Use    Smoking status: Former     Types: Cigarettes    Smokeless tobacco: Never   Substance Use Topics    Alcohol use: Not Currently    Drug use: Never     Review of Systems  All other systems reviewed and negative except as noted in HPI    Physical Exam     Initial Vitals [11/19/23 2055]   BP Pulse Resp Temp SpO2   116/74 98 15 98.7 °F (37.1 °C) 98 %      MAP       --         Physical Exam    Nursing note and vitals reviewed.        General: AO x 3, NAD. Well nourished. Well Developed  Head: Normocephalic and Atraumatic  HEENT: PERRLA. EOMI. Dry Mucous Membranes.  Neck: Supple, Nontender in midline.  Cardiovascular: RRR. No m/r/g. 2+ Distal Pulses  Pulm/Chest: Chest nontender. Lungs clear to auscultation. No respiratory distress.  Abdomen:  Mild diffuse tenderness to palpation. Nondistended. No rigidity, rebound, or guarding  MSK: extremities atraumatic x 4. Gait normal  Ext: no clubbing, cyanosis, or edema  Neuro: GCS 15. CN II-XII intact. Intact symmetric sensation, strength, DTR x 4 extremities  Skin: no bullae, petechiae, or purpura. Warm, dry, and intact.  Psych: normal mood and affect.    ED Course   Procedures  Labs Reviewed   CBC W/ AUTO DIFFERENTIAL - Abnormal; Notable for the following components:       Result Value    RBC 4.08 (*)     Hemoglobin 10.6 (*)     Hematocrit 34.8 (*)     MCH 26.0 (*)     MCHC 30.5 (*)     RDW 18.9 (*)     All other components within normal limits   COMPREHENSIVE METABOLIC PANEL - Abnormal; Notable for the following components:    Potassium 3.2 (*)     BUN 5 (*)     All other components within normal limits    LIPASE - Abnormal; Notable for the following components:    Lipase 192 (*)     All other components within normal limits   ISTAT PROCEDURE - Abnormal; Notable for the following components:    POC BUN 3 (*)     POC Potassium 3.1 (*)     POC Hematocrit 35 (*)     All other components within normal limits          Imaging Results              CT Abdomen Pelvis With IV Contrast (Final result)  Result time 11/20/23 02:47:18      Final result by Nimesh Stockton MD (11/20/23 02:47:18)                   Impression:      Multilobulated peripancreatic fluid collection is mildly decreased in size from prior study.  This likely represents a pancreatic pseudocyst.  Additionally, there is continued improvement in peripancreatic inflammatory change.    Stable right-sided pleural effusion.  Interval resolution of left pleural effusion.    Hepatosplenomegaly with chronic splenic vein thrombosis.  Multiple perisplenic collateral vessels again noted.    Mild bladder wall thickening, similar to prior.  Nonspecific, but may be setting of cystitis.  Recommend correlation with urinalysis.    Stable mild dilation and inflammatory change about the proximal appendix, similar to prior.  Mild acute appendicitis is not excluded, though the distal appendix is filled with air and appears normal.  Conversely, the finding may be related to extension of peripancreatic inflammation along the right pericolic gutter which is more pronounced on prior study.    Trace free fluid in the pelvis, somewhat decreased from prior.    Probable biliary sludge.  No evidence of acute cholecystitis.    Electronically signed by resident: Sally Mcguire  Date:    11/20/2023  Time:    02:04    Electronically signed by: Nimesh Stockton MD  Date:    11/20/2023  Time:    02:47               Narrative:    EXAMINATION:  CT ABDOMEN PELVIS WITH IV CONTRAST    CLINICAL HISTORY:  Pancreatitis, acute, severe;    TECHNIQUE:  Axial images of the abdomen and pelvis were  acquired after the use of 75 cc Apsa117 IV contrast.  Coronal and sagittal reconstructions were also obtained    COMPARISON:  CT 11/14/2023, 10/12/2023, 09/22/2023, 09/18/2023    FINDINGS:  Thoracic soft tissues: Partially visualized thoracic soft tissues appear unremarkable.    Heart: No pericardial effusion.    Lungs: Lung bases are clear.  Small right pleural effusion, similar to prior.  Interval resolution of left pleural effusion.  Stable mild atelectasis at the right lung base.    Esophagus: Unremarkable.    Stomach and duodenum: Unremarkable.    Liver: Hepatomegaly.  No focal hepatic lesion.    Gallbladder: Biliary sludge.  No evidence of acute cholecystitis.  No hyperdense stones.    Bile Ducts: No evidence of dilated ducts.    Spleen: Splenomegaly.  Likely at least partial thrombosis of the splenic vein, similar to prior.  Multiple perisplenic collateral vessels.    Pancreas:    There is a large multilobulated peripancreatic fluid collection, which is slightly decreased in size compared to CT 11/14/2023.  There are 3 connecting components of this collection.  There are 3 connecting components measuring 5.6 x 5.5 cm (previously 6.8 x 6.2 cm friend (2-74), 3.2 x 7.4 cm (previously 8.4 x 3.7 cm) (2-52), and 3.5 x 1.7 cm (previously 2.3 x 3.8 cm) (2-39).    This could represent a large pancreatic pseudocyst.  Continued improvement in peripancreatic inflammatory change.    Adrenals: Unremarkable.    Kidneys/Ureters: Normal in size and location. Normal enhancement. No hydronephrosis or nephrolithiasis. No ureteral dilatation.    Bladder: Mild circumferential bladder wall thickening, nonspecific but may be seen in the setting of urinary tract infection.    Reproductive organs: Unremarkable.    Bowel/Mesentery: Small bowel is normal in caliber with no evidence of obstruction.  No evidence of inflammation or wall thickening.  Colon demonstrates no focal wall thickening.  Redemonstration of the mild dilation of the  proximal appendix to 9 mm with mild wall thickening and adjacent stranding, similar to prior study.  Distal appendix is normal.    Peritoneum: Trace free fluid in the pelvis, decreased from prior.  Multilobulated collection adjacent to the pancreas, please see above for further details.    Lymph nodes: No lymphadenopathy.    Abdominal wall:  Unremarkable.    Vasculature: No aneurysm. No significant calcific atherosclerosis.  Splenic vein thrombosis, unchanged from prior.    Bones: No acute fracture. No suspicious osseous lesions.                                       Medications   HYDROmorphone injection 1 mg (1 mg Intravenous Given 11/19/23 2143)   ondansetron injection 4 mg (4 mg Intravenous Given 11/19/23 2142)   lactated ringers bolus 1,000 mL (0 mLs Intravenous Stopped 11/20/23 0000)   HYDROmorphone injection 1 mg (1 mg Intravenous Given 11/19/23 2301)   potassium bicarbonate disintegrating tablet 20 mEq (20 mEq Oral Given 11/19/23 2301)   iohexoL (OMNIPAQUE 350) injection 75 mL (75 mLs Intravenous Given 11/20/23 0100)   HYDROmorphone injection 1 mg (1 mg Intravenous Given 11/20/23 0205)   ondansetron injection 4 mg (4 mg Intravenous Given 11/20/23 0205)   droPERidol injection 1.25 mg (1.25 mg Intravenous Given 11/20/23 0349)   HYDROmorphone injection 1 mg (1 mg Intravenous Given 11/20/23 0539)     Medical Decision Making  Patient appears volume depleted on examination but is in no acute distress.  His H&H have improved from previous.  Doubt acute significant GI bleed.  Lipase has improved slightly from previous.  Abdominal exam is non peritoneal.  Will replete potassium.  Will provide IV hydration.  If patient improves clinically, will plan for discharge to follow up for advanced endoscopic study on 1121.  Otherwise, will admit for pain control, IV hydration.    Care transitioned to overnight team pending results of CT abdomen pelvis    Amount and/or Complexity of Data Reviewed  External Data Reviewed: labs,  radiology, ECG and notes.     Details: History of acute on chronic recurrent necrotizing pancreatitis and HIV.  Plan for endoscopic ultrasound with drainage of pancreatic cysts/pseudocyst on 11/21/2023 at [facility].  Persistent lipase elevation noted.  Baseline anemia noted with hemoglobin between 8 and 9  Labs: ordered. Decision-making details documented in ED Course.  Radiology: ordered and independent interpretation performed. Decision-making details documented in ED Course.    Risk  Prescription drug management.  Decision regarding hospitalization.  Diagnosis or treatment significantly limited by social determinants of health.                                   Clinical Impression:  Final diagnoses:  [K86.1] Chronic pancreatitis, unspecified pancreatitis type (Primary)  [K86.3] Pancreatic pseudocyst          ED Disposition Condition    Discharge Stable          ED Prescriptions       Medication Sig Dispense Start Date End Date Auth. Provider    ondansetron (ZOFRAN-ODT) 4 MG TbDL Take 2 tablets (8 mg total) by mouth every 6 (six) hours as needed (n/v). 30 tablet 11/20/2023 -- Abelardo Arango MD          Follow-up Information       Follow up With Specialties Details Why Contact Info    Pina Jones, NP Family Medicine Schedule an appointment as soon as possible for a visit   [address]  [place] [address]  [phone]               Sessions, Slim HAGAN MD  11/20/23 8938

## 2023-11-20 NOTE — DISCHARGE INSTRUCTIONS
Please take your pain medicines as prescribed.  You may take Zofran and Phenergan for nausea vomiting.  Please go to your scheduled GI appointment tomorrow for your pancreatic procedure.

## 2023-11-20 NOTE — PROVIDER PROGRESS NOTES - EMERGENCY DEPT.
Encounter Date: 11/19/2023    ED Physician Progress Notes            Received sign-out from previous team plan was follow-up CT scan ensure pain control reassess patient.  Patient resting in bed no acute distress.  He was endorsing improvement in his symptoms but they are still there.  His blood work was grossly reassuring.  No leukocytosis no left shift no significant anemia no significant electrolyte abnormality liver enzymes within normal limits, lipase trending down, CT abdomen pelvis obtained reviewed and interpreted personally.  Improving pancreatic pseudocyst with improving inflammatory changes.  Patient was given multiple rounds of pain medication.  Patient has scheduled GI appointment tomorrow for drainage of the pancreatic pseudocyst with [facility] since some.  Discussed with patient at length, I discussed with him options of admission here for further pain control versus discharge patient taking his prescribed oral Dilaudid and to go to his appointment Tuesday morning.  We elected that it was better for patient to be discharged continue his oral pain regimen and to go to [facility] for his scheduled procedure.  Return precautions discussed will give Zofran to assist with nausea patient to be discharged.

## 2023-11-27 ENCOUNTER — HOSPITAL ENCOUNTER (EMERGENCY)
Facility: HOSPITAL | Age: 23
Discharge: HOME | End: 2023-11-27
Attending: STUDENT IN AN ORGANIZED HEALTH CARE EDUCATION/TRAINING PROGRAM
Payer: MEDICAID

## 2023-11-27 VITALS
SYSTOLIC BLOOD PRESSURE: 145 MMHG | BODY MASS INDEX: 22.19 KG/M2 | WEIGHT: 155 LBS | HEART RATE: 103 BPM | HEIGHT: 70 IN | OXYGEN SATURATION: 100 % | TEMPERATURE: 99 F | RESPIRATION RATE: 20 BRPM | DIASTOLIC BLOOD PRESSURE: 85 MMHG

## 2023-11-27 DIAGNOSIS — R00.0 TACHYCARDIA: ICD-10-CM

## 2023-11-27 DIAGNOSIS — G89.18 POST-OP PAIN: Primary | ICD-10-CM

## 2023-11-27 DIAGNOSIS — Z87.19 HX OF PANCREATITIS: ICD-10-CM

## 2023-11-27 LAB
ALBUMIN SERPL BCP-MCNC: 3.2 G/DL (ref 3.5–5.2)
ALP SERPL-CCNC: 96 U/L (ref 55–135)
ALT SERPL W/O P-5'-P-CCNC: 13 U/L (ref 10–44)
ANION GAP SERPL CALC-SCNC: 13 MMOL/L (ref 8–16)
ANISOCYTOSIS BLD QL SMEAR: SLIGHT
AST SERPL-CCNC: 19 U/L (ref 10–40)
BASOPHILS # BLD AUTO: 0.03 K/UL (ref 0–0.2)
BASOPHILS NFR BLD: 0.4 % (ref 0–1.9)
BILIRUB SERPL-MCNC: 0.6 MG/DL (ref 0.1–1)
BUN SERPL-MCNC: 5 MG/DL (ref 6–20)
CALCIUM SERPL-MCNC: 9.2 MG/DL (ref 8.7–10.5)
CHLORIDE SERPL-SCNC: 104 MMOL/L (ref 95–110)
CO2 SERPL-SCNC: 20 MMOL/L (ref 23–29)
CREAT SERPL-MCNC: 0.7 MG/DL (ref 0.5–1.4)
DIFFERENTIAL METHOD: ABNORMAL
EOSINOPHIL # BLD AUTO: 0.2 K/UL (ref 0–0.5)
EOSINOPHIL NFR BLD: 1.9 % (ref 0–8)
ERYTHROCYTE [DISTWIDTH] IN BLOOD BY AUTOMATED COUNT: 18.4 % (ref 11.5–14.5)
EST. GFR  (NO RACE VARIABLE): >60 ML/MIN/1.73 M^2
GLUCOSE SERPL-MCNC: 99 MG/DL (ref 70–110)
HCT VFR BLD AUTO: 31.9 % (ref 40–54)
HGB BLD-MCNC: 9.9 G/DL (ref 14–18)
HYPOCHROMIA BLD QL SMEAR: ABNORMAL
IMM GRANULOCYTES # BLD AUTO: 0.03 K/UL (ref 0–0.04)
IMM GRANULOCYTES NFR BLD AUTO: 0.4 % (ref 0–0.5)
LIPASE SERPL-CCNC: 9 U/L (ref 4–60)
LYMPHOCYTES # BLD AUTO: 1.6 K/UL (ref 1–4.8)
LYMPHOCYTES NFR BLD: 20 % (ref 18–48)
MCH RBC QN AUTO: 26.1 PG (ref 27–31)
MCHC RBC AUTO-ENTMCNC: 31 G/DL (ref 32–36)
MCV RBC AUTO: 84 FL (ref 82–98)
MONOCYTES # BLD AUTO: 0.9 K/UL (ref 0.3–1)
MONOCYTES NFR BLD: 10.8 % (ref 4–15)
NEUTROPHILS # BLD AUTO: 5.3 K/UL (ref 1.8–7.7)
NEUTROPHILS NFR BLD: 66.5 % (ref 38–73)
NRBC BLD-RTO: 0 /100 WBC
PLATELET # BLD AUTO: 202 K/UL (ref 150–450)
PLATELET BLD QL SMEAR: ABNORMAL
PMV BLD AUTO: ABNORMAL FL (ref 9.2–12.9)
POTASSIUM SERPL-SCNC: 3.9 MMOL/L (ref 3.5–5.1)
PROT SERPL-MCNC: 7.6 G/DL (ref 6–8.4)
RBC # BLD AUTO: 3.79 M/UL (ref 4.6–6.2)
SODIUM SERPL-SCNC: 137 MMOL/L (ref 136–145)
WBC # BLD AUTO: 7.99 K/UL (ref 3.9–12.7)

## 2023-11-27 PROCEDURE — 93005 ELECTROCARDIOGRAM TRACING: CPT

## 2023-11-27 PROCEDURE — 85025 COMPLETE CBC W/AUTO DIFF WBC: CPT | Performed by: EMERGENCY MEDICINE

## 2023-11-27 PROCEDURE — 25000003 PHARM REV CODE 250: Performed by: PHYSICIAN ASSISTANT

## 2023-11-27 PROCEDURE — 93010 EKG 12-LEAD: ICD-10-PCS | Mod: ,,, | Performed by: INTERNAL MEDICINE

## 2023-11-27 PROCEDURE — 83690 ASSAY OF LIPASE: CPT | Performed by: EMERGENCY MEDICINE

## 2023-11-27 PROCEDURE — 80053 COMPREHEN METABOLIC PANEL: CPT | Performed by: EMERGENCY MEDICINE

## 2023-11-27 PROCEDURE — 96374 THER/PROPH/DIAG INJ IV PUSH: CPT

## 2023-11-27 PROCEDURE — 96361 HYDRATE IV INFUSION ADD-ON: CPT

## 2023-11-27 PROCEDURE — 93010 ELECTROCARDIOGRAM REPORT: CPT | Mod: ,,, | Performed by: INTERNAL MEDICINE

## 2023-11-27 PROCEDURE — 63600175 PHARM REV CODE 636 W HCPCS: Performed by: PHYSICIAN ASSISTANT

## 2023-11-27 PROCEDURE — 99284 EMERGENCY DEPT VISIT MOD MDM: CPT | Mod: 25

## 2023-11-27 RX ORDER — DROPERIDOL 2.5 MG/ML
2.5 INJECTION, SOLUTION INTRAMUSCULAR; INTRAVENOUS ONCE
Status: COMPLETED | OUTPATIENT
Start: 2023-11-27 | End: 2023-11-27

## 2023-11-27 RX ADMIN — DROPERIDOL 2.5 MG: 2.5 INJECTION, SOLUTION INTRAMUSCULAR; INTRAVENOUS at 09:11

## 2023-11-27 RX ADMIN — SODIUM CHLORIDE 1000 ML: 9 INJECTION, SOLUTION INTRAVENOUS at 09:11

## 2023-11-28 ENCOUNTER — HOSPITAL ENCOUNTER (OUTPATIENT)
Facility: HOSPITAL | Age: 23
Discharge: HOME | End: 2023-11-29
Attending: EMERGENCY MEDICINE | Admitting: EMERGENCY MEDICINE
Payer: MEDICAID

## 2023-11-28 DIAGNOSIS — R00.0 TACHYCARDIA: ICD-10-CM

## 2023-11-28 DIAGNOSIS — K85.90 PANCREATITIS: ICD-10-CM

## 2023-11-28 DIAGNOSIS — B20 HIV INFECTION, UNSPECIFIED SYMPTOM STATUS: Primary | ICD-10-CM

## 2023-11-28 LAB
ALBUMIN SERPL BCP-MCNC: 3.5 G/DL (ref 3.5–5.2)
ALP SERPL-CCNC: 94 U/L (ref 55–135)
ALT SERPL W/O P-5'-P-CCNC: 12 U/L (ref 10–44)
ANION GAP SERPL CALC-SCNC: 11 MMOL/L (ref 8–16)
APTT PPP: 26.4 SEC (ref 21–32)
AST SERPL-CCNC: 12 U/L (ref 10–40)
BASOPHILS # BLD AUTO: 0.04 K/UL (ref 0–0.2)
BASOPHILS NFR BLD: 0.5 % (ref 0–1.9)
BILIRUB SERPL-MCNC: 0.6 MG/DL (ref 0.1–1)
BUN SERPL-MCNC: 5 MG/DL (ref 6–20)
CALCIUM SERPL-MCNC: 9.5 MG/DL (ref 8.7–10.5)
CHLORIDE SERPL-SCNC: 105 MMOL/L (ref 95–110)
CO2 SERPL-SCNC: 23 MMOL/L (ref 23–29)
CREAT SERPL-MCNC: 0.6 MG/DL (ref 0.5–1.4)
DIFFERENTIAL METHOD: ABNORMAL
EOSINOPHIL # BLD AUTO: 0.2 K/UL (ref 0–0.5)
EOSINOPHIL NFR BLD: 1.9 % (ref 0–8)
ERYTHROCYTE [DISTWIDTH] IN BLOOD BY AUTOMATED COUNT: 17.6 % (ref 11.5–14.5)
EST. GFR  (NO RACE VARIABLE): >60 ML/MIN/1.73 M^2
GLUCOSE SERPL-MCNC: 98 MG/DL (ref 70–110)
HCT VFR BLD AUTO: 33 % (ref 40–54)
HGB BLD-MCNC: 10.1 G/DL (ref 14–18)
IMM GRANULOCYTES # BLD AUTO: 0.04 K/UL (ref 0–0.04)
IMM GRANULOCYTES NFR BLD AUTO: 0.5 % (ref 0–0.5)
INR PPP: 1.2 (ref 0.8–1.2)
LIPASE SERPL-CCNC: 18 U/L (ref 4–60)
LYMPHOCYTES # BLD AUTO: 2.4 K/UL (ref 1–4.8)
LYMPHOCYTES NFR BLD: 29.3 % (ref 18–48)
MCH RBC QN AUTO: 25.6 PG (ref 27–31)
MCHC RBC AUTO-ENTMCNC: 30.6 G/DL (ref 32–36)
MCV RBC AUTO: 84 FL (ref 82–98)
MONOCYTES # BLD AUTO: 1.1 K/UL (ref 0.3–1)
MONOCYTES NFR BLD: 14.1 % (ref 4–15)
NEUTROPHILS # BLD AUTO: 4.3 K/UL (ref 1.8–7.7)
NEUTROPHILS NFR BLD: 53.7 % (ref 38–73)
NRBC BLD-RTO: 0 /100 WBC
PLATELET # BLD AUTO: 314 K/UL (ref 150–450)
PMV BLD AUTO: 10.6 FL (ref 9.2–12.9)
POTASSIUM SERPL-SCNC: 3.5 MMOL/L (ref 3.5–5.1)
PROT SERPL-MCNC: 8 G/DL (ref 6–8.4)
PROTHROMBIN TIME: 12.4 SEC (ref 9–12.5)
RBC # BLD AUTO: 3.95 M/UL (ref 4.6–6.2)
SODIUM SERPL-SCNC: 139 MMOL/L (ref 136–145)
WBC # BLD AUTO: 8.08 K/UL (ref 3.9–12.7)

## 2023-11-28 PROCEDURE — 96361 HYDRATE IV INFUSION ADD-ON: CPT

## 2023-11-28 PROCEDURE — 85610 PROTHROMBIN TIME: CPT | Performed by: EMERGENCY MEDICINE

## 2023-11-28 PROCEDURE — 80053 COMPREHEN METABOLIC PANEL: CPT

## 2023-11-28 PROCEDURE — 96376 TX/PRO/DX INJ SAME DRUG ADON: CPT

## 2023-11-28 PROCEDURE — 99285 EMERGENCY DEPT VISIT HI MDM: CPT | Mod: 25

## 2023-11-28 PROCEDURE — 85025 COMPLETE CBC W/AUTO DIFF WBC: CPT

## 2023-11-28 PROCEDURE — 96375 TX/PRO/DX INJ NEW DRUG ADDON: CPT

## 2023-11-28 PROCEDURE — 25500020 PHARM REV CODE 255: Performed by: EMERGENCY MEDICINE

## 2023-11-28 PROCEDURE — 96374 THER/PROPH/DIAG INJ IV PUSH: CPT

## 2023-11-28 PROCEDURE — 85730 THROMBOPLASTIN TIME PARTIAL: CPT | Performed by: EMERGENCY MEDICINE

## 2023-11-28 PROCEDURE — 63600175 PHARM REV CODE 636 W HCPCS

## 2023-11-28 PROCEDURE — 83690 ASSAY OF LIPASE: CPT | Performed by: EMERGENCY MEDICINE

## 2023-11-28 RX ORDER — ONDANSETRON 2 MG/ML
4 INJECTION INTRAMUSCULAR; INTRAVENOUS
Status: COMPLETED | OUTPATIENT
Start: 2023-11-28 | End: 2023-11-28

## 2023-11-28 RX ORDER — HYDROMORPHONE HYDROCHLORIDE 1 MG/ML
1 INJECTION, SOLUTION INTRAMUSCULAR; INTRAVENOUS; SUBCUTANEOUS ONCE
Status: COMPLETED | OUTPATIENT
Start: 2023-11-28 | End: 2023-11-28

## 2023-11-28 RX ORDER — HYDROMORPHONE HYDROCHLORIDE 1 MG/ML
1 INJECTION, SOLUTION INTRAMUSCULAR; INTRAVENOUS; SUBCUTANEOUS ONCE
Status: DISCONTINUED | OUTPATIENT
Start: 2023-11-29 | End: 2023-11-28

## 2023-11-28 RX ORDER — ONDANSETRON 2 MG/ML
4 INJECTION INTRAMUSCULAR; INTRAVENOUS EVERY 6 HOURS PRN
Status: DISCONTINUED | OUTPATIENT
Start: 2023-11-28 | End: 2023-11-29

## 2023-11-28 RX ORDER — HYDROMORPHONE HYDROCHLORIDE 1 MG/ML
INJECTION, SOLUTION INTRAMUSCULAR; INTRAVENOUS; SUBCUTANEOUS
Status: DISPENSED
Start: 2023-11-28 | End: 2023-11-29

## 2023-11-28 RX ORDER — ONDANSETRON 2 MG/ML
INJECTION INTRAMUSCULAR; INTRAVENOUS
Status: DISPENSED
Start: 2023-11-28 | End: 2023-11-29

## 2023-11-28 RX ADMIN — ONDANSETRON 4 MG: 2 INJECTION INTRAMUSCULAR; INTRAVENOUS at 09:11

## 2023-11-28 RX ADMIN — ONDANSETRON 4 MG: 2 INJECTION INTRAMUSCULAR; INTRAVENOUS at 10:11

## 2023-11-28 RX ADMIN — SODIUM CHLORIDE, SODIUM LACTATE, POTASSIUM CHLORIDE, AND CALCIUM CHLORIDE 1000 ML: .6; .31; .03; .02 INJECTION, SOLUTION INTRAVENOUS at 09:11

## 2023-11-28 RX ADMIN — HYDROMORPHONE HYDROCHLORIDE 1 MG: 1 INJECTION, SOLUTION INTRAMUSCULAR; INTRAVENOUS; SUBCUTANEOUS at 10:11

## 2023-11-28 RX ADMIN — HYDROMORPHONE HYDROCHLORIDE 1 MG: 1 INJECTION, SOLUTION INTRAMUSCULAR; INTRAVENOUS; SUBCUTANEOUS at 09:11

## 2023-11-28 RX ADMIN — IOHEXOL 75 ML: 350 INJECTION, SOLUTION INTRAVENOUS at 09:11

## 2023-11-28 NOTE — ED PROVIDER NOTES
"Encounter Date: 11/27/2023       History     Chief Complaint   Patient presents with    Post-op Problem     Hx pancreatitis, had  stent placed today, now vomiting     8:10 PM  23M with well controlled HIV, EtOH use, acute necrotizing pancreatitis, splenic vein thrombosis on apixaban, asthma, anemia, corinne Sam  who presents to [facility] ED via [facility] for emergent evaluation of for misti upper quadrant abd pain, nausea, and vomiting.    He reports 2 stents, pancreatic and biliary stents.     Patient had Endoscopic Pancreatic Necrosectomy at [facility] today and discharged in stable condition. He states that he went home after and started with symptoms again. He reports similar symptoms to past. Has had "bile" emesis with streaks of blood. Has hard stool with streaks of blood. No melena. No fever, but has chills. Unable to hold medication down. Patient requesting 2mg of dilaudid.           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     Past Surgical History:   Procedure Laterality Date    ENDOSCOPIC ULTRASOUND OF UPPER GASTROINTESTINAL TRACT N/A 10/23/2023    Procedure: ULTRASOUND, UPPER GI TRACT, ENDOSCOPIC;  Surgeon: Emil Villatoro MD;  Location: [address]);  Service: Endoscopy;  Laterality: N/A;    ERCP N/A 10/23/2023    Procedure: ERCP (ENDOSCOPIC RETROGRADE CHOLANGIOPANCREATOGRAPHY);  " Surgeon: Emil Villatoro MD;  Location: [facility] ([address] [place] [address]);  Service: Endoscopy;  Laterality: N/A;    ESOPHAGOGASTRODUODENOSCOPY N/A 9/18/2023    Procedure: EGD (ESOPHAGOGASTRODUODENOSCOPY);  Surgeon: Kg Cleaning MD;  [facility]  Service: Endoscopy;  Laterality: N/A;     Family History   Problem Relation Age of Onset    Pancreatitis Mother     Cancer Mother     Ovarian cancer Mother     No Known Problems Father     Cancer Brother     Breast cancer Maternal Grandmother      Social History     Tobacco Use    Smoking status: Former     Types: Cigarettes    Smokeless tobacco: Never   Substance Use Topics    Alcohol use: Not Currently    Drug use: Never     Review of Systems   Constitutional:  Positive for appetite change and chills. Negative for activity change, diaphoresis and fever.   HENT:  Negative for sore throat.    Respiratory:  Negative for cough and shortness of breath.    Cardiovascular:  Negative for chest pain.   Gastrointestinal:  Positive for abdominal pain, blood in stool, nausea and vomiting.   Genitourinary:  Negative for dysuria, frequency and hematuria.   Musculoskeletal:  Negative for back pain.   Skin:  Negative for rash.   Neurological:  Negative for weakness, numbness and headaches.   Hematological:  Does not bruise/bleed easily.       Physical Exam     Initial Vitals [11/27/23 1843]   BP Pulse Resp Temp SpO2   133/68 (!) 133 20 99.8 °F (37.7 °C) 97 %      MAP       --         Physical Exam    Vitals reviewed.  Constitutional: He appears well-developed and well-nourished. He is not diaphoretic. He is cooperative.  Non-toxic appearance. He does not have a sickly appearance. He does not appear ill. No distress.   HENT:   Head: Normocephalic and atraumatic.   Nose: Nose normal.   Mouth/Throat: No trismus in the jaw.   Eyes: Conjunctivae and EOM are normal.   Neck:   Normal range of motion.  Pulmonary/Chest: No accessory muscle usage. No tachypnea. No respiratory distress.    Abdominal: Abdomen is soft. He exhibits no distension. There is generalized abdominal tenderness. There is guarding. There is no rebound.   Musculoskeletal:         General: Normal range of motion.      Cervical back: Normal range of motion.     Neurological: He is alert. He has normal strength.   Skin: Skin is dry. No pallor.         ED Course   Procedures  Labs Reviewed   CBC W/ AUTO DIFFERENTIAL - Abnormal; Notable for the following components:       Result Value    RBC 3.79 (*)     Hemoglobin 9.9 (*)     Hematocrit 31.9 (*)     MCH 26.1 (*)     MCHC 31.0 (*)     RDW 18.4 (*)     Platelet Estimate Clumped (*)     All other components within normal limits   COMPREHENSIVE METABOLIC PANEL - Abnormal; Notable for the following components:    CO2 20 (*)     BUN 5 (*)     Albumin 3.2 (*)     All other components within normal limits   LIPASE   URINALYSIS, REFLEX TO URINE CULTURE   ISTAT CHEM8          Imaging Results    None          Medications   droPERidol injection 2.5 mg (2.5 mg Intravenous Given 11/27/23 2113)   sodium chloride 0.9% bolus 1,000 mL 1,000 mL (0 mLs Intravenous Stopped 11/27/23 2146)     Medical Decision Making  23M with well controlled HIV, EtOH use, acute necrotizing pancreatitis, splenic vein thrombosis on apixaban, asthma, anemia, brandy Sam  who presents to [facility] ED via [facility] for emergent evaluation of for misti upper quadrant abd pain, nausea, and vomiting.    DDx includes but is not limited to acute on chronic pancreatitis, fluid collection, cyst, post op complication such as perforation, gerd, gastritis, pud, cbd obstruction, sepsis since tachy, drug seeking. However is appears to be resting comfortably and in NAD, smiling while talking on exam.     Will initiate work up, give droperidol which he has had before, and continue to monitor. I have review his . He does not received month Rx from a single provider. Will advance therapy if symptoms do not improve.     Amount and/or Complexity of  Data Reviewed  External Data Reviewed: labs, radiology and notes.  Labs:  Decision-making details documented in ED Course.  Radiology: ordered.  ECG/medicine tests: ordered and independent interpretation performed.     Details: On my independent interpretation, ECG with sinus tach in 120s. Nonspecific T wave changes. Normal intervals. No STEMI.     Risk  Prescription drug management.              Attending Attestation:     Physician Attestation Statement for NP/PA:   I have directed and reviewed the workup performed by the PA/NP.  I performed the substantive portion of the medical decision making.     Other NP/PA Attestation Additions:      Medical Decision Making: Pt signed out AMA so he could go take care of his dog. See PA documentation for full details.              ED Course as of 11/27/23 2149 Mon Nov 27, 2023 1945 BP: 133/68 [CL]   1945 Temp: 99.8 °F (37.7 °C) [CL]   1945 Pulse(!): 133 [CL]   1945 Resp: 20 [CL]   1945 SpO2: 97 % [CL]   2052 WBC: 7.99 [CL]   2052 Hemoglobin(!): 9.9 [CL]   2052 Immature Granulocytes: 0.4 [CL]   2129 Sodium: 137 [CL]   2129 Potassium: 3.9 [CL]   2129 Glucose: 99 [CL]   2129 BUN(!): 5 [CL]   2129 Creatinine: 0.7 [CL]   2129 BILIRUBIN TOTAL: 0.6 [CL]   2129 AST: 19 [CL]   2129 ALT: 13 [CL]   2129 Lipase: 9 [CL]   2138 RN notified me that patient wants to leave [place]. He states his  canceled on him and he cannot leave his pet at home. He reports feeling OK. We discussed labs which do not show elevated lipase (normally is elevated) or signs of CBD obstruction. He is refusing CT abd pelv with contrast to rule out post op complication such as perforation and other acute surgical problems. He understands that we cannot clearly say he doesn't have these problems without getting test. He is competent and can make his own medical decisions. He understands risk and complications. He still would like to leave [place]. He understands that he can return to any [facility] facility if he  wants at any time. He continues to be in NAD and nontoxic appearing.  Vitals improved after medication and fluids although he did not finish his whole 1 L. He also reports CT scan with [facility] tomorrow but I cannot see that on EPIC. [CL]   2146 BP(!): 145/85 [CL]   2146 Temp: 99.4 °F (37.4 °C) [CL]   2146 Pulse: 103 [CL]   2146 Resp: 20 [CL]   2146 SpO2: 100 % [CL]      ED Course User Index  [CL] Ayla Neal PA-C                     I have reviewed patient's chart and discussed this case with my supervising MD.     Ayla Neal PA-C  Emergent Department  [facility]  Spectralink #[id number] or #[id number]        Clinical Impression:  Final diagnoses:  [R00.0] Tachycardia  [G89.18] Post-op pain (Primary)  [Z87.19] Hx of pancreatitis          ED Disposition Condition    AMA Stable              Visit Details    Encounter Start   12/4/2023     Providers    Juan Sandhu MD  Gastroenterology  [address] [place] [address]&&  [place] [address]     Phone: [phone]  Fax: [phone]          Ayla Neal PA-C  11/27/23 2225       Johann Mcgee MD  11/27/23 1651

## 2023-11-28 NOTE — FIRST PROVIDER EVALUATION
Medical screening examination initiated.  I have conducted a focused provider triage encounter, findings are as follows:    Brief history of present illness:  abd pain, n/v, 2 biliary/hepatic stents placed today at [facility].  Came here due to proximity to his house.    There were no vitals filed for this visit.    Pertinent physical exam:  ambulatory, tachycardia    Brief workup plan:  labs, EKG    Preliminary workup initiated; this workup will be continued and followed by the physician or advanced practice provider that is assigned to the patient when roomed.

## 2023-11-28 NOTE — ED TRIAGE NOTES
24 yo M presents to the ED c/o N/V s/p pancreatic stent placement earlier today. Last emesis just prior to arrival.    Addended by: RYAN ESPINAL on: 10/16/2023 02:29 PM     Modules accepted: Orders

## 2023-11-28 NOTE — ED NOTES
"Pt states need to leave. States "my  cancelled and my dog can't be left alone. I might be backl later, but I have to go." Provider messaged.   "

## 2023-11-29 VITALS
TEMPERATURE: 99 F | DIASTOLIC BLOOD PRESSURE: 56 MMHG | BODY MASS INDEX: 22.19 KG/M2 | OXYGEN SATURATION: 100 % | HEIGHT: 70 IN | SYSTOLIC BLOOD PRESSURE: 98 MMHG | RESPIRATION RATE: 20 BRPM | WEIGHT: 155 LBS | HEART RATE: 99 BPM

## 2023-11-29 PROBLEM — G89.4 CHRONIC PAIN SYNDROME: Status: ACTIVE | Noted: 2023-11-29

## 2023-11-29 PROCEDURE — G0378 HOSPITAL OBSERVATION PER HR: HCPCS

## 2023-11-29 PROCEDURE — 25000003 PHARM REV CODE 250: Performed by: HOSPITALIST

## 2023-11-29 PROCEDURE — 63600175 PHARM REV CODE 636 W HCPCS: Performed by: HOSPITALIST

## 2023-11-29 PROCEDURE — 96361 HYDRATE IV INFUSION ADD-ON: CPT

## 2023-11-29 PROCEDURE — 96376 TX/PRO/DX INJ SAME DRUG ADON: CPT

## 2023-11-29 RX ORDER — HYDROMORPHONE HYDROCHLORIDE 1 MG/ML
1 INJECTION, SOLUTION INTRAMUSCULAR; INTRAVENOUS; SUBCUTANEOUS EVERY 4 HOURS PRN
Status: DISCONTINUED | OUTPATIENT
Start: 2023-11-29 | End: 2023-11-29

## 2023-11-29 RX ORDER — METOCLOPRAMIDE HYDROCHLORIDE 5 MG/5ML
10 SOLUTION ORAL EVERY 6 HOURS PRN
Qty: 473 ML | Refills: 0 | Status: ON HOLD | OUTPATIENT
Start: 2023-11-29 | End: 2023-12-21 | Stop reason: CLARIF

## 2023-11-29 RX ORDER — ONDANSETRON 2 MG/ML
4 INJECTION INTRAMUSCULAR; INTRAVENOUS EVERY 6 HOURS PRN
Status: DISCONTINUED | OUTPATIENT
Start: 2023-11-29 | End: 2023-11-29 | Stop reason: HOSPADM

## 2023-11-29 RX ORDER — NALOXONE HCL 0.4 MG/ML
0.02 VIAL (ML) INJECTION
Status: DISCONTINUED | OUTPATIENT
Start: 2023-11-29 | End: 2023-11-29 | Stop reason: HOSPADM

## 2023-11-29 RX ORDER — GLUCAGON 1 MG
1 KIT INJECTION
Status: DISCONTINUED | OUTPATIENT
Start: 2023-11-29 | End: 2023-11-29 | Stop reason: HOSPADM

## 2023-11-29 RX ORDER — POLYETHYLENE GLYCOL 3350 17 G/17G
17 POWDER, FOR SOLUTION ORAL DAILY PRN
Status: ON HOLD | COMMUNITY
End: 2023-12-21 | Stop reason: CLARIF

## 2023-11-29 RX ORDER — OXYCODONE HYDROCHLORIDE 5 MG/1
5 TABLET ORAL EVERY 4 HOURS PRN
Status: DISCONTINUED | OUTPATIENT
Start: 2023-11-29 | End: 2023-11-29 | Stop reason: HOSPADM

## 2023-11-29 RX ORDER — PREGABALIN 75 MG/1
150 CAPSULE ORAL 2 TIMES DAILY
Status: DISCONTINUED | OUTPATIENT
Start: 2023-11-29 | End: 2023-11-29 | Stop reason: HOSPADM

## 2023-11-29 RX ORDER — SODIUM CHLORIDE 0.9 % (FLUSH) 0.9 %
10 SYRINGE (ML) INJECTION EVERY 12 HOURS PRN
Status: DISCONTINUED | OUTPATIENT
Start: 2023-11-29 | End: 2023-11-29 | Stop reason: HOSPADM

## 2023-11-29 RX ORDER — IBUPROFEN 200 MG
16 TABLET ORAL
Status: DISCONTINUED | OUTPATIENT
Start: 2023-11-29 | End: 2023-11-29 | Stop reason: HOSPADM

## 2023-11-29 RX ORDER — IBUPROFEN 200 MG
24 TABLET ORAL
Status: DISCONTINUED | OUTPATIENT
Start: 2023-11-29 | End: 2023-11-29 | Stop reason: HOSPADM

## 2023-11-29 RX ORDER — DEXTROSE MONOHYDRATE, SODIUM CHLORIDE, AND POTASSIUM CHLORIDE 50; 1.49; 4.5 G/1000ML; G/1000ML; G/1000ML
INJECTION, SOLUTION INTRAVENOUS CONTINUOUS
Status: DISCONTINUED | OUTPATIENT
Start: 2023-11-29 | End: 2023-11-29

## 2023-11-29 RX ORDER — ACETAMINOPHEN 325 MG/1
650 TABLET ORAL EVERY 4 HOURS PRN
Status: DISCONTINUED | OUTPATIENT
Start: 2023-11-29 | End: 2023-11-29 | Stop reason: HOSPADM

## 2023-11-29 RX ORDER — AMOXICILLIN 250 MG
2 CAPSULE ORAL 2 TIMES DAILY PRN
Status: DISCONTINUED | OUTPATIENT
Start: 2023-11-29 | End: 2023-11-29 | Stop reason: HOSPADM

## 2023-11-29 RX ORDER — METOCLOPRAMIDE HYDROCHLORIDE 5 MG/5ML
10 SOLUTION ORAL EVERY 6 HOURS PRN
Status: DISCONTINUED | OUTPATIENT
Start: 2023-11-29 | End: 2023-11-29 | Stop reason: HOSPADM

## 2023-11-29 RX ORDER — FOLIC ACID 1 MG/1
1 TABLET ORAL DAILY
Status: DISCONTINUED | OUTPATIENT
Start: 2023-11-29 | End: 2023-11-29 | Stop reason: HOSPADM

## 2023-11-29 RX ORDER — IPRATROPIUM BROMIDE AND ALBUTEROL SULFATE 2.5; .5 MG/3ML; MG/3ML
3 SOLUTION RESPIRATORY (INHALATION) EVERY 4 HOURS PRN
Status: DISCONTINUED | OUTPATIENT
Start: 2023-11-29 | End: 2023-11-29 | Stop reason: HOSPADM

## 2023-11-29 RX ORDER — AMOXICILLIN 250 MG
2 CAPSULE ORAL 2 TIMES DAILY PRN
Qty: 60 TABLET | Refills: 0 | Status: ON HOLD | OUTPATIENT
Start: 2023-11-29 | End: 2023-12-21 | Stop reason: CLARIF

## 2023-11-29 RX ADMIN — POTASSIUM CHLORIDE, DEXTROSE MONOHYDRATE AND SODIUM CHLORIDE: 150; 5; 450 INJECTION, SOLUTION INTRAVENOUS at 02:11

## 2023-11-29 RX ADMIN — FOLIC ACID 1 MG: 1 TABLET ORAL at 09:11

## 2023-11-29 RX ADMIN — PREGABALIN 150 MG: 75 CAPSULE ORAL at 09:11

## 2023-11-29 RX ADMIN — METOCLOPRAMIDE HYDROCHLORIDE 10 MG: 5 SOLUTION ORAL at 04:11

## 2023-11-29 RX ADMIN — OXYCODONE HYDROCHLORIDE 5 MG: 5 TABLET ORAL at 01:11

## 2023-11-29 RX ADMIN — ACETAMINOPHEN 650 MG: 325 TABLET ORAL at 04:11

## 2023-11-29 RX ADMIN — ONDANSETRON 4 MG: 2 INJECTION INTRAMUSCULAR; INTRAVENOUS at 09:11

## 2023-11-29 RX ADMIN — POTASSIUM CHLORIDE, DEXTROSE MONOHYDRATE AND SODIUM CHLORIDE: 150; 5; 450 INJECTION, SOLUTION INTRAVENOUS at 11:11

## 2023-11-29 RX ADMIN — BICTEGRAVIR SODIUM, EMTRICITABINE, AND TENOFOVIR ALAFENAMIDE FUMARATE 1 TABLET: 50; 200; 25 TABLET ORAL at 11:11

## 2023-11-29 RX ADMIN — HYDROMORPHONE HYDROCHLORIDE 1 MG: 1 INJECTION, SOLUTION INTRAMUSCULAR; INTRAVENOUS; SUBCUTANEOUS at 03:11

## 2023-11-29 RX ADMIN — HYDROMORPHONE HYDROCHLORIDE 1 MG: 1 INJECTION, SOLUTION INTRAMUSCULAR; INTRAVENOUS; SUBCUTANEOUS at 09:11

## 2023-11-29 RX ADMIN — OXYCODONE HYDROCHLORIDE 5 MG: 5 TABLET ORAL at 05:11

## 2023-11-29 NOTE — ED PROVIDER NOTES
Encounter Date: 11/28/2023       History     Chief Complaint   Patient presents with    Post-op Problem     Procedure yesterday to place a stent; vomiting started last night with abd pain; HIV + and pancreatitis.     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 Underwent endoscopic pancreatic necrosectomy at [facility] 11/27 and discharged in stable condition. Symptoms occurred after he got home. Has had multiple episodes of blood streaked emesis (15 today), unable to tolerate PO intake. Has some associated sharp chest pain associated with deep inhalation, but no SOB, dyspnea, or O2 requirement. Has noticed some melanic stools as well. Has not eaten today.       The history is provided by the patient.     Review of patient's allergies indicates:   Allergen Reactions    [place] Swelling    Pcn [penicillins] Hives     Tolerates cephalosporins    Pecan nut Swelling    Sulfa (sulfonamide antibiotics) Hives     Past Medical History:   Diagnosis Date    Acute necrotizing pancreatitis 09/20/2023    Alcohol use disorder 10/05/2023    Asthma     Hepatitis C antibody positive in blood 09/18/2023 9/2023 PCR negative    HIV infection 10/2021    Brandy-Chauhan tear 09/18/2023    Normocytic anemia 09/18/2023    Pancreatic pseudocyst/cyst 10/24/2023    Polycythemia vera 11/28/2021    Receives phlebotomy if Hct>45    Positive CMV IgG serology 09/21/2023    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     Past Surgical History:   Procedure Laterality Date    ENDOSCOPIC ULTRASOUND OF UPPER GASTROINTESTINAL TRACT N/A 10/23/2023    Procedure: ULTRASOUND, UPPER GI TRACT, ENDOSCOPIC;  Surgeon: Emil Villatoro MD;  Location: [facility] ([address] [place] [address]);  Service: Endoscopy;  Laterality:  N/A;    ERCP N/A 10/23/2023    Procedure: ERCP (ENDOSCOPIC RETROGRADE CHOLANGIOPANCREATOGRAPHY);  Surgeon: Emil Villatoro MD;  Location: [facility] ([address] [place] [address]);  Service: Endoscopy;  Laterality: N/A;    ESOPHAGOGASTRODUODENOSCOPY N/A 9/18/2023    Procedure: EGD (ESOPHAGOGASTRODUODENOSCOPY);  Surgeon: Kg Cleaning MD;  Location: [facility] ([address] [place] [address]);  Service: Endoscopy;  Laterality: N/A;     Family History   Problem Relation Age of Onset    Pancreatitis Mother     Cancer Mother     Ovarian cancer Mother     No Known Problems Father     Cancer Brother     Breast cancer Maternal Grandmother      Social History     Tobacco Use    Smoking status: Former     Types: Cigarettes    Smokeless tobacco: Never   Substance Use Topics    Alcohol use: Not Currently    Drug use: Never     Review of Systems   Gastrointestinal:  Positive for abdominal pain, blood in stool, nausea and vomiting.   All other systems reviewed and are negative.      Physical Exam     Initial Vitals [11/28/23 1827]   BP Pulse Resp Temp SpO2   115/69 (!) 120 20 99.1 °F (37.3 °C) 100 %      MAP       --         Physical Exam    Constitutional: He appears well-developed and well-nourished.   HENT:   Head: Normocephalic and atraumatic.   Eyes: EOM are normal.   Neck:   Normal range of motion.  Cardiovascular:  Normal rate and regular rhythm.           No murmur heard.  Pulmonary/Chest: Breath sounds normal. No respiratory distress.   Abdominal: Abdomen is soft. There is abdominal tenderness.   Musculoskeletal:         General: Normal range of motion.      Cervical back: Normal range of motion.     Neurological: He is alert and oriented to person, place, and time.   Skin: Skin is warm and dry.   Psychiatric: He has a normal mood and affect. Thought content normal.         ED Course   Procedures  Labs Reviewed   CBC W/ AUTO DIFFERENTIAL - Abnormal; Notable for the following components:       Result Value    RBC 3.95 (*)     Hemoglobin 10.1 (*)     Hematocrit  33.0 (*)     MCH 25.6 (*)     MCHC 30.6 (*)     RDW 17.6 (*)     Mono # 1.1 (*)     All other components within normal limits   COMPREHENSIVE METABOLIC PANEL - Abnormal; Notable for the following components:    BUN 5 (*)     All other components within normal limits   LIPASE   PROTIME-INR   APTT     EKG Readings: (Independently Interpreted)   Initial Reading: No STEMI. Rhythm: Sinus Tachycardia.       Imaging Results              CT Abdomen Pelvis With IV Contrast NO Oral Contrast (Final result)  Result time 11/28/23 22:56:52      Final result by Jake Rodriguez MD (11/28/23 22:56:52)                   Impression:      In this patient with history of necrotizing pancreatitis with multiple peripancreatic fluid collections, there has been apparent interval cystgastrostomy at outside institution.  Previously demonstrated fluid collections appear similar to mildly decreased in size compared to prior examination.  There is moderate volume of gas now present within the collections which may relate to recent manipulation/cyst-gastrostomy tube placement versus temporal evolution of necrotic pancreatic collections.  No new large discrete collection identified.    New minimal patchy subsegmental opacities in the left lower lung lobe which could relate to infection or aspiration.    Splenomegaly with known chronic thrombosis of the splenic vein.  Multiple collateral vessels throughout the upper abdomen.    Stable mild nonspecific urinary bladder wall thickening.    Minimally prominent fluid-filled loops of small bowel without discrete transition point.  This may represent a reactive ileus versus a nonspecific enteritis.    Additional findings as above.      Electronically signed by: Jake Rodriguez MD  Date:    11/28/2023  Time:    22:56               Narrative:    EXAMINATION:  CT ABDOMEN PELVIS WITH IV CONTRAST    CLINICAL HISTORY:  Abdominal pain, post-op;Peritonitis or perforation suspected;Epigastric  pain;    TECHNIQUE:  Low dose axial images, sagittal and coronal reformations were obtained from the lung bases to the pubic symphysis following the IV administration of 75 mL of Omnipaque 350 .  Oral contrast was not given.    COMPARISON:  CT 11/20/2023    FINDINGS:  There are new minimal patchy subsegmental opacities within the left lower lobe.  The remaining lungs appear well aerated without evidence of confluent airspace consolidation.  The visualized portions of the heart appear normal.    The liver is mildly enlarged.  No focal hepatic abnormality identified.  No radiopaque calculi identified in the gallbladder lumen.  There is no intra-or extrahepatic biliary ductal dilatation.    In this patient with a history of pancreatitis and multiple pancreatic fluid collections, there has been apparent interval cystgastrostomy.  The previously demonstrated fluid collections appear mildly decreased in size compared to prior examination and now contain moderate volume of gas.  The cystgastrostomy communicates with a previously demonstrated collection in the anterior abdomen measuring 3.2 x 5.1 cm (previously 5.5 x 5.6 cm).  The other primary pancreatic collection now measures 2.8 x 7.5 cm (previously 3.2 x 7.4 cm).  Additional small collection along the medial aspect of the liver is significantly decreased in size and contains a small punctate focus of gas now measuring 1.7 x 0.6 cm (previously 3.5 x 1.7 cm).  No new discrete collection identified.    Stomach otherwise appears within normal limits.  The spleen is enlarged.  There is known chronic thrombosis of the splenic vein with multiple collateral vessels throughout the upper abdomen.  The extrahepatic portal vein and SMV appear patent.  The adrenal glands are unremarkable.    Kidneys are normal in size and location and enhance symmetrically.  There is no evidence of hydronephrosis. Urinary bladder demonstrates mild nonspecific circumferential wall thickening,  similar to prior examination.  The prostate demonstrates no significant abnormality.    The abdominal aorta is normal in course and caliber without significant atherosclerotic calcifications.  Incidental note is made of a retroaortic left renal vein.    There are minimally prominent fluid-filled loops of small bowel throughout the abdomen and pelvis without discrete transition point.  This may relate to reactive ileus versus a nonspecific enteritis.  Colon appears within normal limits.  There is stable minimal fluid-filled prominence of the appendix without significant localized inflammatory change to suggest acute appendicitis.  There are scattered mildly prominent mesenteric and retroperitoneal lymph nodes, presumably reactive in etiology.  No large volume of free intraperitoneal air.    When viewed with bone windows the osseous structures are unremarkable.  The extraperitoneal soft tissues are unremarkable.                                       Medications   ondansetron injection 4 mg (4 mg Intravenous Given 11/28/23 2144)   lactated ringers bolus 1,000 mL (0 mLs Intravenous Stopped 11/28/23 2250)   HYDROmorphone injection 1 mg (1 mg Intravenous Given 11/28/23 2148)   iohexoL (OMNIPAQUE 350) injection 75 mL (75 mLs Intravenous Given 11/28/23 2156)   ondansetron injection 4 mg (4 mg Intravenous Given 11/28/23 2249)   HYDROmorphone injection 1 mg (1 mg Intravenous Given 11/28/23 2249)     Medical Decision Making  23M with recurring pancreatitis s/p necrosectomy with biliary and pancreatic stent placement done 11/27 at [facility] here for persistent upper abdominal pain, nausea, and vomiting. Noted rebound tenderness over epigastrium. No rigidity. Tachycardic likely 2/2 decreased oral intake and emesis, no hypotension. Afebrile on admission, has subjective fever and chills.     Amount and/or Complexity of Data Reviewed  External Data Reviewed: notes.     Details: Reviewed last ED note   Labs: ordered. Decision-making  details documented in ED Course.  Radiology: ordered. Decision-making details documented in ED Course.  ECG/medicine tests:  Decision-making details documented in ED Course.    Risk  Prescription drug management.               ED Course as of 11/29/23 0032 Tue Nov 28, 2023 2249 Lipase  Unlikely pancreatitis  [AF]   2249 Comprehensive metabolic panel(!)  No acute electrolyte derangements  [AF]   Wed Nov 29, 2023   0031 CT Abdomen Pelvis With IV Contrast NO Oral Contrast  No SBO, no new fluid collections or evidence of discrete abscess, no acute intraadbominal findings. Possible ileus vs enteritis.  [AF]   0032 WBC: 8.08 [AF]   0032 Hemoglobin(!): 10.1 [AF]      ED Course User Index  [AF] Roxy Justin MD                             Clinical Impression:  Final diagnoses:  [R00.0] Tachycardia  [K85.90] Pancreatitis          ED Disposition Condition    Observation                 Roxy Justin MD  Resident  11/29/23 0032

## 2023-11-29 NOTE — SUBJECTIVE & OBJECTIVE
Past Medical History:   Diagnosis Date    Acute necrotizing pancreatitis 09/20/2023    Alcohol use disorder 10/05/2023    Asthma     Hepatitis C antibody positive in blood 09/18/2023 9/2023 PCR negative    HIV infection 10/2021    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       Past Surgical History:   Procedure Laterality Date    ENDOSCOPIC ULTRASOUND OF UPPER GASTROINTESTINAL TRACT N/A 10/23/2023    Procedure: ULTRASOUND, UPPER GI TRACT, ENDOSCOPIC;  Surgeon: Emil Villatoro MD;  [facility]  Service: Endoscopy;  Laterality: N/A;    ERCP N/A 10/23/2023    Procedure: ERCP (ENDOSCOPIC RETROGRADE CHOLANGIOPANCREATOGRAPHY);  Surgeon: Emil Villatoro MD;  [facility]  Service: Endoscopy;  Laterality: N/A;    ESOPHAGOGASTRODUODENOSCOPY N/A 9/18/2023    Procedure: EGD (ESOPHAGOGASTRODUODENOSCOPY);  Surgeon: Kg Cleaning MD;  [facility]  Service: Endoscopy;  Laterality: N/A;       Review of patient's allergies indicates:   Allergen Reactions    [place] Swelling    Pcn [penicillins] Hives     Tolerates cephalosporins    Pecan nut Swelling    Sulfa (sulfonamide antibiotics) Hives       No current facility-administered medications on file prior to encounter.     Current Outpatient Medications on File Prior to Encounter   Medication Sig    iblzehvqf-nzsofpnw-iuxtesv ala (BIKTARVY) -25 mg (25 kg or greater) Take 1 tablet by mouth once daily.    celecoxib (CELEBREX) 200 MG capsule Take 1 capsule (200 mg total) by mouth once daily.    folic acid (FOLVITE) 1 MG tablet Take 1 tablet (1 mg total) by mouth once daily.    lipase-protease-amylase 6,000-19,000-30,000 units (CREON 6000) 6,000-19,000 -30,000 unit capsule Take 2 capsules by mouth 3 (three) times daily  with meals.    ondansetron (ZOFRAN-ODT) 4 MG TbDL Take 2 tablets (8 mg total) by mouth every 6 (six) hours as needed (n/v).    oxyCODONE (ROXICODONE) 5 MG immediate release tablet Take 1 tablet (5 mg total) by mouth every 4 (four) hours as needed for Pain.    pantoprazole (PROTONIX) 40 MG tablet Take 1 tablet (40 mg total) by mouth 2 (two) times daily.    pregabalin (LYRICA) 150 MG capsule Take 1 capsule (150 mg total) by mouth 2 (two) times daily.    promethazine (PHENERGAN) 25 MG tablet Take 25 mg by mouth every 4 (four) hours as needed for Nausea.     Family History       Problem Relation (Age of Onset)    Breast cancer Maternal Grandmother    Cancer Mother, Brother    No Known Problems Father    Ovarian cancer Mother    Pancreatitis Mother          Tobacco Use    Smoking status: Former     Types: Cigarettes    Smokeless tobacco: Never   Substance and Sexual Activity    Alcohol use: Not Currently    Drug use: Never    Sexual activity: Not on file     Review of Systems   Constitutional:  Positive for chills and unexpected weight change. Negative for activity change, appetite change, diaphoresis, fatigue and fever.   HENT:  Negative for congestion, dental problem, drooling, ear discharge, ear pain, facial swelling, hearing loss, mouth sores, nosebleeds, postnasal drip, rhinorrhea, sinus pressure, sneezing, sore throat, tinnitus, trouble swallowing and voice change.    Eyes:  Negative for photophobia, pain, discharge, redness, itching and visual disturbance.   Respiratory:  Negative for apnea, cough, choking, chest tightness, shortness of breath, wheezing and stridor.    Cardiovascular:  Negative for chest pain, palpitations and leg swelling.   Gastrointestinal:  Positive for abdominal pain, nausea and vomiting. Negative for abdominal distention, anal bleeding, blood in stool, constipation, diarrhea and rectal pain.   Endocrine: Negative for cold intolerance, heat intolerance, polydipsia, polyphagia and polyuria.    Genitourinary:  Negative for decreased urine volume, difficulty urinating, dysuria, enuresis, flank pain, frequency, genital sores, hematuria, penile discharge, penile pain, penile swelling, scrotal swelling, testicular pain and urgency.   Musculoskeletal:  Negative for arthralgias, back pain, gait problem, joint swelling, myalgias, neck pain and neck stiffness.   Skin:  Negative for color change, pallor, rash and wound.   Allergic/Immunologic: Negative for environmental allergies, food allergies and immunocompromised state.   Neurological:  Negative for dizziness, tremors, seizures, syncope, facial asymmetry, speech difficulty, weakness, light-headedness, numbness and headaches.   Hematological:  Negative for adenopathy. Does not bruise/bleed easily.   Psychiatric/Behavioral:  Negative for agitation, behavioral problems, confusion, decreased concentration, dysphoric mood, hallucinations, self-injury, sleep disturbance and suicidal ideas. The patient is not nervous/anxious and is not hyperactive.      Objective:     Vital Signs (Most Recent):  Temp: 98.2 °F (36.8 °C) (11/29/23 0006)  Pulse: 85 (11/29/23 0006)  Resp: 18 (11/29/23 0323)  BP: 99/60 (11/29/23 0006)  SpO2: 98 % (11/29/23 0006) Vital Signs (24h Range):  Temp:  [98.2 °F (36.8 °C)-99.1 °F (37.3 °C)] 98.2 °F (36.8 °C)  Pulse:  [] 85  Resp:  [16-20] 18  SpO2:  [98 %-100 %] 98 %  BP: ()/(60-69) 99/60     Weight: 70.3 kg (155 lb)  Body mass index is 22.24 kg/m².     Physical Exam  Constitutional:       General: He is not in acute distress.     Appearance: He is well-developed. He is not diaphoretic.   HENT:      Head: Normocephalic and atraumatic.      Nose: Nose normal.      Mouth/Throat:      Pharynx: No oropharyngeal exudate.   Eyes:      General: No scleral icterus.     Conjunctiva/sclera: Conjunctivae normal.      Pupils: Pupils are equal, round, and reactive to light.   Neck:      Thyroid: No thyromegaly.      Vascular: No JVD.      Trachea:  No tracheal deviation.   Cardiovascular:      Rate and Rhythm: Normal rate and regular rhythm.      Heart sounds: Normal heart sounds. No murmur heard.  Pulmonary:      Effort: Pulmonary effort is normal. No respiratory distress.      Breath sounds: Normal breath sounds. No wheezing or rales.   Chest:      Chest wall: No tenderness.   Abdominal:      General: Bowel sounds are normal. There is no distension.      Palpations: Abdomen is soft. There is no mass.      Tenderness: There is abdominal tenderness (moderate epigastric TTP). There is no guarding or rebound.   Musculoskeletal:         General: No tenderness. Normal range of motion.      Cervical back: Normal range of motion and neck supple.   Lymphadenopathy:      Cervical: No cervical adenopathy.   Skin:     General: Skin is warm and dry.      Findings: No erythema or rash.   Neurological:      Mental Status: He is alert and oriented to person, place, and time.      Cranial Nerves: No cranial nerve deficit.      Motor: No abnormal muscle tone.      Coordination: Coordination normal.      Deep Tendon Reflexes: Reflexes are normal and symmetric. Reflexes normal.   Psychiatric:         Thought Content: Thought content normal.         Judgment: Judgment normal.              CRANIAL NERVES     CN III, IV, VI   Pupils are equal, round, and reactive to light.       Significant Labs: All pertinent labs within the past 24 hours have been reviewed.  Recent Lab Results         11/28/23  2118        Albumin 3.5       ALP 94       ALT 12       Anion Gap 11       aPTT 26.4  Comment: Refer to local heparin nomogram for intensity/dose specific   therapeutic   range.         AST 12       Baso # 0.04       Basophil % 0.5       BILIRUBIN TOTAL 0.6  Comment: For infants and newborns, interpretation of results should be based  on gestational age, weight and in agreement with clinical  observations.    Premature Infant recommended reference ranges:  Up to 24 hours.............<8.0  mg/dL  Up to 48 hours............<12.0 mg/dL  3-5 days..................<15.0 mg/dL  6-29 days.................<15.0 mg/dL         BUN 5       Calcium 9.5       Chloride 105       CO2 23       Creatinine 0.6       Differential Method Automated       eGFR >60.0       Eos # 0.2       Eosinophil % 1.9       Glucose 98       Gran # (ANC) 4.3       Gran % 53.7       Hematocrit 33.0       Hemoglobin 10.1       Immature Grans (Abs) 0.04  Comment: Mild elevation in immature granulocytes is non specific and   can be seen in a variety of conditions including stress response,   acute inflammation, trauma and pregnancy. Correlation with other   laboratory and clinical findings is essential.         Immature Granulocytes 0.5       INR 1.2  Comment: Coumadin Therapy:  2.0 - 3.0 for INR for all indicators except mechanical heart valves  and antiphospholipid syndromes which should use 2.5 - 3.5.         Lipase 18       Lymph # 2.4       Lymph % 29.3       MCH 25.6       MCHC 30.6       MCV 84       Mono # 1.1       Mono % 14.1       MPV 10.6       nRBC 0       Platelet Count 314       Potassium 3.5       PROTEIN TOTAL 8.0       Protime 12.4       RBC 3.95       RDW 17.6       Sodium 139       WBC 8.08               Significant Imaging: I have reviewed all pertinent imaging results/findings within the past 24 hours.

## 2023-11-29 NOTE — ASSESSMENT & PLAN NOTE
"-initially developed acute alcoholic pancreatitis in September 2023 complicated by necrotizing pancreatitis and peripancreatic fluid collection   -recently admitted to [facility] 11/20-11/27 for above with EUS on 11/21 and EGD pancreatic necrosectomy by GI on 11/27  - Had EGD on11/27 at [facility] - "90% of necrotic material cleared. Healthy appearing granulation tissue in cavity. Replaced plastic double pigtail stent, axios remains in place.  per GI stop abx and IVF, stop acid suppressive therapy, stop carafate and pancreatic enzymes - ok for home from GI standpoint - repeat EGD with necrosectomy next week"    -presented to ED today with worsening epigastric pain and N/V since recent discharged yesterday   -CT today showed interval gastrocystostomy and mild decreased in size of peripancreatic fluid collection   -afebrile and no leukocytosis   -clinically improved with IV dilaudid 1 mg x 2, zofran and IVF given in ED   -there is concern for drug seeking behavior documented at [facility] as patient would set up alarm on his phone for IV narcotics though he did not appear to be in pain   -will order oxycodone and IV dilaudid severe pain with close monitoring   -clear liquid diet and IVF   -consider AES consult if worsening of clinical status     "

## 2023-11-29 NOTE — HPI
Tasia Cardona is a 23 y.o. male PMHx of acute necrotizing pancreatitis in September complicated by peripancreatic and perigastric complex fluid collection, recent hospital admission at [facility] 11/20-11/27 s/p EGD with pancreatic necrosectomy on 11/27, splenic vein thrombosis (discontinued Eliquis), HIV on Biktarvy, and EtOH use who presented to the ED for abdominal pain. Patient was just discharged from [facility] yesterday after pancreatic necrosectomy on 11/27 with follow up CT abdomen as outpatient and he was discharged on oxycodone 10 mg. Patient reports worsening epigastric abdominal pain associated with intractable nausea and vomiting since discharged home. Patient not sure if there was blood in vomiting as it was dark grey in color. He reports unable to keep anything down including pain medicine due to N/V. Reports ~10lb weight loss since September. He endorses subjective chills, but denies fever, sob, chest pain, diarrhea, dysuria or hematuria. Denies tobacco, alcohol or other illicit drug use. He works as a .     ED course: afebrile and vitals stable. No leukocytosis, Hgb stable at 10, LFT and lipase negative. CT abdomen showed interval cystgastrostomy with mid decreased size of peripancreatic fluid collection, splenomegaly with known chronic splenic vein thrombosis. Patient received dilaudid 1 mg IV x 2, zofran 4 mg IV and LR 1 liter bolus in ED. During my interview, patient is awake, conversant and not in distress. He reports improved with symptoms with above treatment but feels like pain is coming back and he needs more dilaudid.

## 2023-11-29 NOTE — PROGRESS NOTES
[facility] - Emergency Dept  [facility] Medicine  Progress Note    Patient Name: Tasia Cardona  MRN: [id number]  Patient Class: OP- Observation   Admission Date: 11/28/2023  Length of Stay: 0 days  Attending Physician: Janey Cho MD  Primary Care Provider: Pina Jones NP        Subjective:     Principal Problem:Fluid collection of pancreas        HPI:  Tasia Cardona is a 23 y.o. male PMHx of acute necrotizing pancreatitis in September complicated by peripancreatic and perigastric complex fluid collection, recent hospital admission at [facility] 11/20-11/27 s/p EGD with pancreatic necrosectomy on 11/27, splenic vein thrombosis (discontinued Eliquis), HIV on Biktarvy, and EtOH use who presented to the ED for abdominal pain. Patient was just discharged from [facility] yesterday after pancreatic necrosectomy on 11/27 with follow up CT abdomen as outpatient and he was discharged on oxycodone 10 mg. Patient reports worsening epigastric abdominal pain associated with intractable nausea and vomiting since discharged home. Patient not sure if there was blood in vomiting as it was dark grey in color. He reports unable to keep anything down including pain medicine due to N/V. Reports ~10lb weight loss since September. He endorses subjective chills, but denies fever, sob, chest pain, diarrhea, dysuria or hematuria. Denies tobacco, alcohol or other illicit drug use. He works as a .     ED course: afebrile and vitals stable. No leukocytosis, Hgb stable at 10, LFT and lipase negative. CT abdomen showed interval cystgastrostomy with mid decreased size of peripancreatic fluid collection, splenomegaly with known chronic splenic vein thrombosis. Patient received dilaudid 1 mg IV x 2, zofran 4 mg IV and LR 1 liter bolus in ED. During my interview, patient is awake, conversant and not in distress. He reports improved with symptoms with above treatment but feels like pain is coming back and he needs more  dilaudid.            Overview/Hospital Course:  11/29- Placed in Obs  for N&V, abd pain, chronic due to Alcoholic necrotizing pancreatitis. CT & lab are stable.  Has stable HIV on biktarvi. Home pain regemin; lyrica, oxycodone 5 and celebrex. Add reglan for N&V. He has 2 anti-emetics already. Looks comfortable. Conversant.   - would not give dilaudid which will make N&V worse. Pt needs to be on a regemin he can take at home. This is chronic pain and pseudocyst is appropriately diminishing in size. Educated pt on what to expect. It may take week to months for this to improve. He has follow up planned on Monday and another procedure is planned. I discussed the  need to gradually wean opioids to prevent worsening long term pain syndrome. Dc IVFs.  Likely can go home. Advance to full liquid diet.  Will keep for monitoring until ready to go home.     CT- In this patient with history of necrotizing pancreatitis with multiple peripancreatic fluid collections, there has been apparent interval cystgastrostomy at outside institution.  Previously demonstrated fluid collections appear similar to mildly decreased in size compared to prior examination.  There is moderate volume of gas now present within the collections which may relate to recent manipulation/cyst-gastrostomy tube placement versus temporal evolution of necrotic pancreatic collections.  No new large discrete collection identified. New minimal patchy subsegmental opacities in the left lower lung lobe which could relate to infection or aspiration.  Splenomegaly with known chronic thrombosis of the splenic vein.  Multiple collateral vessels throughout the upper abdomen. Stable mild nonspecific urinary bladder wall thickening. Minimally prominent fluid-filled loops of small bowel without discrete transition point.  This may represent a reactive ileus versus a nonspecific enteritis.    Interval History: see above    Review of Systems   Constitutional:  Positive for  activity change and appetite change. Negative for fever.   HENT:  Negative for trouble swallowing.    Respiratory:  Negative for shortness of breath.    Cardiovascular:  Negative for chest pain.   Gastrointestinal:  Positive for abdominal pain (chronic), nausea and vomiting.   Genitourinary:  Negative for difficulty urinating.   Musculoskeletal:  Negative for neck stiffness.   Neurological:  Negative for headaches.   Psychiatric/Behavioral:  Negative for agitation, behavioral problems and confusion.      Objective:     Vital Signs (Most Recent):  Temp: 98.2 °F (36.8 °C) (11/29/23 0818)  Pulse: 77 (11/29/23 0818)  Resp: 20 (11/29/23 0910)  BP: (!) 109/59 (11/29/23 0818)  SpO2: 97 % (11/29/23 0818) Vital Signs (24h Range):  Temp:  [97.3 °F (36.3 °C)-99.1 °F (37.3 °C)] 98.2 °F (36.8 °C)  Pulse:  [] 77  Resp:  [14-20] 20  SpO2:  [97 %-100 %] 97 %  BP: ()/(59-69) 109/59     Weight: 70.3 kg (155 lb)  Body mass index is 22.24 kg/m².    Intake/Output Summary (Last 24 hours) at 11/29/2023 1013  Last data filed at 11/28/2023 2250  Gross per 24 hour   Intake 1000 ml   Output --   Net 1000 ml      Physical Exam  Constitutional:       General: He is not in acute distress.     Appearance: Normal appearance. He is ill-appearing. He is not toxic-appearing or diaphoretic.   HENT:      Head: Normocephalic and atraumatic.      Nose: Nose normal.      Mouth/Throat:      Mouth: Mucous membranes are moist.      Pharynx: Oropharynx is clear.   Eyes:      General: No scleral icterus.     Extraocular Movements: Extraocular movements intact.      Conjunctiva/sclera: Conjunctivae normal.      Pupils: Pupils are equal, round, and reactive to light.   Cardiovascular:      Rate and Rhythm: Normal rate and regular rhythm.      Pulses: Normal pulses.      Heart sounds: Normal heart sounds.   Pulmonary:      Effort: Pulmonary effort is normal. No respiratory distress.      Breath sounds: Normal breath sounds. No stridor. No wheezing,  rhonchi or rales.   Chest:      Chest wall: No tenderness.   Abdominal:      General: Abdomen is flat. Bowel sounds are normal. There is no distension.      Palpations: Abdomen is soft.      Tenderness: There is no abdominal tenderness. There is no right CVA tenderness, guarding or rebound.   Musculoskeletal:         General: No swelling, tenderness, deformity or signs of injury. Normal range of motion.      Cervical back: Normal range of motion and neck supple. No rigidity or tenderness.      Right lower leg: No edema.      Left lower leg: No edema.   Skin:     General: Skin is warm and dry.      Coloration: Skin is not jaundiced.      Findings: No erythema or rash.   Neurological:      General: No focal deficit present.      Mental Status: He is alert and oriented to person, place, and time. Mental status is at baseline.      Motor: No weakness.   Psychiatric:         Mood and Affect: Mood normal.         Behavior: Behavior normal.         Thought Content: Thought content normal.         Judgment: Judgment normal.         MELD 3.0: 8 at 11/28/2023  9:18 PM  MELD-Na: 8 at 11/28/2023  9:18 PM  Calculated from:  Serum Creatinine: 0.6 mg/dL (Using min of 1 mg/dL) at 11/28/2023  9:18 PM  Serum Sodium: 139 mmol/L (Using max of 137 mmol/L) at 11/28/2023  9:18 PM  Total Bilirubin: 0.6 mg/dL (Using min of 1 mg/dL) at 11/28/2023  9:18 PM  Serum Albumin: 3.5 g/dL at 11/28/2023  9:18 PM  INR(ratio): 1.2 at 11/28/2023  9:18 PM  Age at listing (hypothetical): 23 years  Sex: Male at 11/28/2023  9:18 PM      Significant Labs:  CBC:  Recent Labs   Lab 11/27/23 2028 11/28/23 2118   WBC 7.99 8.08   HGB 9.9* 10.1*   HCT 31.9* 33.0*    314     CMP:  Recent Labs   Lab 11/27/23 2028 11/28/23 2118    139   K 3.9 3.5    105   CO2 20* 23   GLU 99 98   BUN 5* 5*   CREATININE 0.7 0.6   CALCIUM 9.2 9.5   PROT 7.6 8.0   ALBUMIN 3.2* 3.5   BILITOT 0.6 0.6   ALKPHOS 96 94   AST 19 12   ALT 13 12   ANIONGAP 13 11  "    PTINR:  Recent Labs   Lab 11/28/23 2118   INR 1.2           Assessment/Plan:      * Fluid collection of pancreas  -initially developed acute alcoholic pancreatitis in September 2023 complicated by necrotizing pancreatitis and peripancreatic fluid collection   -recently admitted to [facility] 11/20-11/27 for above with EUS on 11/21 and EGD pancreatic necrosectomy by GI on 11/27  - Had EGD on11/27 at [facility] - "90% of necrotic material cleared. Healthy appearing granulation tissue in cavity. Replaced plastic double pigtail stent, axios remains in place.  per GI stop abx and IVF, stop acid suppressive therapy, stop carafate and pancreatic enzymes - ok for home from GI standpoint - repeat EGD with necrosectomy next week"    -presented to ED today with worsening epigastric pain and N/V since recent discharged yesterday   -CT today showed interval gastrocystostomy and mild decreased in size of peripancreatic fluid collection   -afebrile and no leukocytosis   -clinically improved with IV dilaudid 1 mg x 2, zofran and IVF given in ED   -there is concern for drug seeking behavior documented at [facility] as patient would set up alarm on his phone for IV narcotics though he did not appear to be in pain   -will order oxycodone and IV dilaudid severe pain with close monitoring   -clear liquid diet and IVF   -consider AES consult if worsening of clinical status       Chronic pain syndrome  Maintain home rgemin  He may add tylenol if he does not drink alcohol. I discussed risk fo lever failure if the alcohol is mixed w tylenol. He expressed unerstanding      Chronic pancreatitis  Due alcohol necrotizing pancreatitis with pseudocyst   S/p peripancreatic and perigastric complex fluid collection, recent hospital admission at [facility] 11/20-11/27   s/p EGD with pancreatic necrosectomy on 11/27,   splenic vein thrombosis (discontinued Eliquis),   - cyst is diminishing in size.      Nausea & vomiting  Try reglan      Mild intermittent asthma without " complication  -no signs of exacerbation   -duonebs prn       Splenic vein thrombosis  - Anticoagulation was previously deferred after discussion with heme-onc due to rectal bleeding   -CT abdomen today again showed chronic splenic vein thrombosis   - Will continue to hold anticoagulation at this time       HIV infection  11/12/2023 CD4 1,045   - Continue home biktarvy             VTE Risk Mitigation (From admission, onward)           Ordered     IP VTE HIGH RISK PATIENT  Once         11/29/23 0410     Place sequential compression device  Until discontinued         11/29/23 0410                    Discharge Planning   CASTILLO: 12/1/2023     Code Status: Full Code   Is the patient medically ready for discharge?: Yes    Reason for patient still in hospital (select all that apply): Patient trending condition           Janey Cho MD  Department of Hospital Medicine   [facility] - Emergency Dept

## 2023-11-29 NOTE — ED TRIAGE NOTES
Tasia Cardona, a 23 y.o. male presents to the ED w/ complaint of post op problem and abdominal pain. Pt had a stent placement yesterday was started having nausea along with vomiting when attempting to eat. Pt was seen in the ER yesterday when the onset of symptoms began but left prior to completion of treatment due to personal reasons. Pt has a history of pancreatitis.      Triage note:  Chief Complaint   Patient presents with    Post-op Problem     Procedure yesterday to place a stent; vomiting started last night with abd pain; HIV + and pancreatitis.     Abdominal Pain     Review of patient's allergies indicates:   Allergen Reactions    [place] Swelling    Pcn [penicillins] Hives     Tolerates cephalosporins    Pecan nut Swelling    Sulfa (sulfonamide antibiotics) Hives     Past Medical History:   Diagnosis Date    Acute necrotizing pancreatitis 09/20/2023    Alcohol use disorder 10/05/2023    Asthma     Hepatitis C antibody positive in blood 09/18/2023 9/2023 PCR negative    HIV infection 10/2021    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

## 2023-11-29 NOTE — H&P
[facility] - Emergency Dept  [facility] Medicine  History & Physical    Patient Name: Tasia Cardona  MRN: [id number]  Patient Class: OP- Observation  Admission Date: 11/28/2023  Attending Physician: Jnaey Cho MD   Primary Care Provider: Pina Jones NP         Patient information was obtained from patient and ER records.     Subjective:     Principal Problem:Fluid collection of pancreas    Chief Complaint:   Chief Complaint   Patient presents with    Post-op Problem     Procedure yesterday to place a stent; vomiting started last night with abd pain; HIV + and pancreatitis.     Abdominal Pain        HPI: Tasia Cardona is a 23 y.o. male PMHx of acute necrotizing pancreatitis in September complicated by peripancreatic and perigastric complex fluid collection, recent hospital admission at [facility] 11/20-11/27 s/p EGD with pancreatic necrosectomy on 11/27, splenic vein thrombosis (discontinued Eliquis), HIV on Biktarvy, and EtOH use who presented to the ED for abdominal pain. Patient was just discharged from [facility] yesterday after pancreatic necrosectomy on 11/27 with follow up CT abdomen as outpatient and he was discharged on oxycodone 10 mg. Patient reports worsening epigastric abdominal pain associated with intractable nausea and vomiting since discharged home. Patient not sure if there was blood in vomiting as it was dark grey in color. He reports unable to keep anything down including pain medicine due to N/V. Reports ~10lb weight loss since September. He endorses subjective chills, but denies fever, sob, chest pain, diarrhea, dysuria or hematuria. Denies tobacco, alcohol or other illicit drug use. He works as a .     ED course: afebrile and vitals stable. No leukocytosis, Hgb stable at 10, LFT and lipase negative. CT abdomen showed interval cystgastrostomy with mid decreased size of peripancreatic fluid collection, splenomegaly with known chronic splenic vein thrombosis. Patient received  dilaudid 1 mg IV x 2, zofran 4 mg IV and LR 1 liter bolus in ED. During my interview, patient is awake, conversant and not in distress. He reports improved with symptoms with above treatment but feels like pain is coming back and he needs more dilaudid.            Past Medical History:   Diagnosis Date    Acute necrotizing pancreatitis 09/20/2023    Alcohol use disorder 10/05/2023    Asthma     Hepatitis C antibody positive in blood 09/18/2023 9/2023 PCR negative    HIV infection 10/2021    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       Past Surgical History:   Procedure Laterality Date    ENDOSCOPIC ULTRASOUND OF UPPER GASTROINTESTINAL TRACT N/A 10/23/2023    Procedure: ULTRASOUND, UPPER GI TRACT, ENDOSCOPIC;  Surgeon: Emil Villatoro MD;  Location: [address]);  Service: Endoscopy;  Laterality: N/A;    ERCP N/A 10/23/2023    Procedure: ERCP (ENDOSCOPIC RETROGRADE CHOLANGIOPANCREATOGRAPHY);  Surgeon: Emil Villatoro MD;  Location: [address]);  Service: Endoscopy;  Laterality: N/A;    ESOPHAGOGASTRODUODENOSCOPY N/A 9/18/2023    Procedure: EGD (ESOPHAGOGASTRODUODENOSCOPY);  Surgeon: Kg Cleaning MD;  Location: [address]);  Service: Endoscopy;  Laterality: N/A;       Review of patient's allergies indicates:   Allergen Reactions    Patterson Swelling    Pcn [penicillins] Hives     Tolerates cephalosporins    Pecan nut Swelling    Sulfa (sulfonamide antibiotics) Hives       No current facility-administered medications on file prior to encounter.     Current Outpatient Medications on File Prior to Encounter   Medication Sig    rkahcmlzh-oqkckxdi-trlccph ala (BIKTARVY) -25 mg (25 kg or greater) Take 1 tablet by mouth once daily.    celecoxib (CELEBREX) 200 MG capsule Take 1 capsule (200  mg total) by mouth once daily.    folic acid (FOLVITE) 1 MG tablet Take 1 tablet (1 mg total) by mouth once daily.    lipase-protease-amylase 6,000-19,000-30,000 units (CREON 6000) 6,000-19,000 -30,000 unit capsule Take 2 capsules by mouth 3 (three) times daily with meals.    ondansetron (ZOFRAN-ODT) 4 MG TbDL Take 2 tablets (8 mg total) by mouth every 6 (six) hours as needed (n/v).    oxyCODONE (ROXICODONE) 5 MG immediate release tablet Take 1 tablet (5 mg total) by mouth every 4 (four) hours as needed for Pain.    pantoprazole (PROTONIX) 40 MG tablet Take 1 tablet (40 mg total) by mouth 2 (two) times daily.    pregabalin (LYRICA) 150 MG capsule Take 1 capsule (150 mg total) by mouth 2 (two) times daily.    promethazine (PHENERGAN) 25 MG tablet Take 25 mg by mouth every 4 (four) hours as needed for Nausea.     Family History       Problem Relation (Age of Onset)    Breast cancer Maternal Grandmother    Cancer Mother, Brother    No Known Problems Father    Ovarian cancer Mother    Pancreatitis Mother          Tobacco Use    Smoking status: Former     Types: Cigarettes    Smokeless tobacco: Never   Substance and Sexual Activity    Alcohol use: Not Currently    Drug use: Never    Sexual activity: Not on file     Review of Systems   Constitutional:  Positive for chills and unexpected weight change. Negative for activity change, appetite change, diaphoresis, fatigue and fever.   HENT:  Negative for congestion, dental problem, drooling, ear discharge, ear pain, facial swelling, hearing loss, mouth sores, nosebleeds, postnasal drip, rhinorrhea, sinus pressure, sneezing, sore throat, tinnitus, trouble swallowing and voice change.    Eyes:  Negative for photophobia, pain, discharge, redness, itching and visual disturbance.   Respiratory:  Negative for apnea, cough, choking, chest tightness, shortness of breath, wheezing and stridor.    Cardiovascular:  Negative for chest pain, palpitations and leg swelling.    Gastrointestinal:  Positive for abdominal pain, nausea and vomiting. Negative for abdominal distention, anal bleeding, blood in stool, constipation, diarrhea and rectal pain.   Endocrine: Negative for cold intolerance, heat intolerance, polydipsia, polyphagia and polyuria.   Genitourinary:  Negative for decreased urine volume, difficulty urinating, dysuria, enuresis, flank pain, frequency, genital sores, hematuria, penile discharge, penile pain, penile swelling, scrotal swelling, testicular pain and urgency.   Musculoskeletal:  Negative for arthralgias, back pain, gait problem, joint swelling, myalgias, neck pain and neck stiffness.   Skin:  Negative for color change, pallor, rash and wound.   Allergic/Immunologic: Negative for environmental allergies, food allergies and immunocompromised state.   Neurological:  Negative for dizziness, tremors, seizures, syncope, facial asymmetry, speech difficulty, weakness, light-headedness, numbness and headaches.   Hematological:  Negative for adenopathy. Does not bruise/bleed easily.   Psychiatric/Behavioral:  Negative for agitation, behavioral problems, confusion, decreased concentration, dysphoric mood, hallucinations, self-injury, sleep disturbance and suicidal ideas. The patient is not nervous/anxious and is not hyperactive.      Objective:     Vital Signs (Most Recent):  Temp: 98.2 °F (36.8 °C) (11/29/23 0006)  Pulse: 85 (11/29/23 0006)  Resp: 18 (11/29/23 0323)  BP: 99/60 (11/29/23 0006)  SpO2: 98 % (11/29/23 0006) Vital Signs (24h Range):  Temp:  [98.2 °F (36.8 °C)-99.1 °F (37.3 °C)] 98.2 °F (36.8 °C)  Pulse:  [] 85  Resp:  [16-20] 18  SpO2:  [98 %-100 %] 98 %  BP: ()/(60-69) 99/60     Weight: 70.3 kg (155 lb)  Body mass index is 22.24 kg/m².     Physical Exam  Constitutional:       General: He is not in acute distress.     Appearance: He is well-developed. He is not diaphoretic.   HENT:      Head: Normocephalic and atraumatic.      Nose: Nose normal.       Mouth/Throat:      Pharynx: No oropharyngeal exudate.   Eyes:      General: No scleral icterus.     Conjunctiva/sclera: Conjunctivae normal.      Pupils: Pupils are equal, round, and reactive to light.   Neck:      Thyroid: No thyromegaly.      Vascular: No JVD.      Trachea: No tracheal deviation.   Cardiovascular:      Rate and Rhythm: Normal rate and regular rhythm.      Heart sounds: Normal heart sounds. No murmur heard.  Pulmonary:      Effort: Pulmonary effort is normal. No respiratory distress.      Breath sounds: Normal breath sounds. No wheezing or rales.   Chest:      Chest wall: No tenderness.   Abdominal:      General: Bowel sounds are normal. There is no distension.      Palpations: Abdomen is soft. There is no mass.      Tenderness: There is abdominal tenderness (moderate epigastric TTP). There is no guarding or rebound.   Musculoskeletal:         General: No tenderness. Normal range of motion.      Cervical back: Normal range of motion and neck supple.   Lymphadenopathy:      Cervical: No cervical adenopathy.   Skin:     General: Skin is warm and dry.      Findings: No erythema or rash.   Neurological:      Mental Status: He is alert and oriented to person, place, and time.      Cranial Nerves: No cranial nerve deficit.      Motor: No abnormal muscle tone.      Coordination: Coordination normal.      Deep Tendon Reflexes: Reflexes are normal and symmetric. Reflexes normal.   Psychiatric:         Thought Content: Thought content normal.         Judgment: Judgment normal.              CRANIAL NERVES     CN III, IV, VI   Pupils are equal, round, and reactive to light.       Significant Labs: All pertinent labs within the past 24 hours have been reviewed.  Recent Lab Results         11/28/23  2118        Albumin 3.5       ALP 94       ALT 12       Anion Gap 11       aPTT 26.4  Comment: Refer to local heparin nomogram for intensity/dose specific   therapeutic   range.         AST 12       Baso # 0.04        "Basophil % 0.5       BILIRUBIN TOTAL 0.6  Comment: For infants and newborns, interpretation of results should be based  on gestational age, weight and in agreement with clinical  observations.    Premature Infant recommended reference ranges:  Up to 24 hours.............<8.0 mg/dL  Up to 48 hours............<12.0 mg/dL  3-5 days..................<15.0 mg/dL  6-29 days.................<15.0 mg/dL         BUN 5       Calcium 9.5       Chloride 105       CO2 23       Creatinine 0.6       Differential Method Automated       eGFR >60.0       Eos # 0.2       Eosinophil % 1.9       Glucose 98       Gran # (ANC) 4.3       Gran % 53.7       Hematocrit 33.0       Hemoglobin 10.1       Immature Grans (Abs) 0.04  Comment: Mild elevation in immature granulocytes is non specific and   can be seen in a variety of conditions including stress response,   acute inflammation, trauma and pregnancy. Correlation with other   laboratory and clinical findings is essential.         Immature Granulocytes 0.5       INR 1.2  Comment: Coumadin Therapy:  2.0 - 3.0 for INR for all indicators except mechanical heart valves  and antiphospholipid syndromes which should use 2.5 - 3.5.         Lipase 18       Lymph # 2.4       Lymph % 29.3       MCH 25.6       MCHC 30.6       MCV 84       Mono # 1.1       Mono % 14.1       MPV 10.6       nRBC 0       Platelet Count 314       Potassium 3.5       PROTEIN TOTAL 8.0       Protime 12.4       RBC 3.95       RDW 17.6       Sodium 139       WBC 8.08               Significant Imaging: I have reviewed all pertinent imaging results/findings within the past 24 hours.  Assessment/Plan:     * Fluid collection of pancreas  -initially developed acute alcoholic pancreatitis in September 2023 complicated by necrotizing pancreatitis and peripancreatic fluid collection   -recently admitted to [facility] 11/20-11/27 for above with EUS on 11/21 and EGD pancreatic necrosectomy by GI on 11/27  - Had EGD on11/27 at [facility] - "90% of " "necrotic material cleared. Healthy appearing granulation tissue in cavity. Replaced plastic double pigtail stent, axios remains in place.  per GI stop abx and IVF, stop acid suppressive therapy, stop carafate and pancreatic enzymes - ok for home from GI standpoint - repeat EGD with necrosectomy next week"    -presented to ED today with worsening epigastric pain and N/V since recent discharged yesterday   -CT today showed interval gastrocystostomy and mild decreased in size of peripancreatic fluid collection   -afebrile and no leukocytosis   -clinically improved with IV dilaudid 1 mg x 2, zofran and IVF given in ED   -there is concern for drug seeking behavior documented at [facility] as patient would set up alarm on his phone for IV narcotics though he did not appear to be in pain   -will order oxycodone and IV dilaudid severe pain with close monitoring   -clear liquid diet and IVF   -consider AES consult if worsening of clinical status       Splenic vein thrombosis  - Anticoagulation was previously deferred after discussion with heme-onc due to rectal bleeding   -CT abdomen today again showed chronic splenic vein thrombosis   - Will continue to hold anticoagulation at this time       HIV infection  11/12/2023 CD4 1,045   - Continue home biktarvy           Mild intermittent asthma without complication  -no signs of exacerbation   -duonebs prn         VTE Risk Mitigation (From admission, onward)           Ordered     IP VTE HIGH RISK PATIENT  Once         11/29/23 0410     Place sequential compression device  Until discontinued         11/29/23 0410                       On 11/29/2023, patient should be placed in hospital observation services under my care.        Carey Barnes DO  Department of Hospital Medicine  [facility] - Emergency Dept          "

## 2023-11-29 NOTE — ASSESSMENT & PLAN NOTE
Maintain home rgemin  He may add tylenol if he does not drink alcohol. I discussed risk fo lever failure if the alcohol is mixed w tylenol. He expressed unerstanding

## 2023-11-29 NOTE — ASSESSMENT & PLAN NOTE
"-initially developed acute alcoholic pancreatitis in September 2023 complicated by necrotizing pancreatitis and peripancreatic fluid collection   -recently admitted to [facility] 11/20-11/27 for above with EUS on 11/21 and EGD pancreatic necrosectomy by GI on 11/27  - Had EGD on11/27 at [facility] - "90% of necrotic material cleared. Healthy appearing granulation tissue in cavity. Replaced plastic double pigtail stent, axios remains in place.  per GI stop abx and IVF, stop acid suppressive therapy, stop carafate and pancreatic enzymes - ok for home from GI standpoint - repeat EGD with necrosectomy next week"    -presented to ED today with worsening epigastric pain and N/V since recent discharged yesterday   -CT today showed interval gastrocystostomy and mild decreased in size of peripancreatic fluid collection   -afebrile and no leukocytosis   -clinically improved with IV dilaudid 1 mg x 2, zofran and IVF given in ED   -there is concern for drug seeking behavior documented at [facility] as patient would set up alarm on his phone for IV narcotics though he did not appear to be in pain   -will order oxycodone and IV dilaudid severe pain with close monitoring   -clear liquid diet and IVF   -consider AES consult if worsening of clinical status     "

## 2023-11-29 NOTE — ASSESSMENT & PLAN NOTE
- Anticoagulation was previously deferred after discussion with heme-onc due to rectal bleeding   -CT abdomen today again showed chronic splenic vein thrombosis   - Will continue to hold anticoagulation at this time

## 2023-11-29 NOTE — ED NOTES
Per MD Cho, pt will be discharged, ok to take tylenol for pain as long as pt does not drink. Pt educated by MD and RN

## 2023-11-29 NOTE — ASSESSMENT & PLAN NOTE
- Anticoagulation was previously deferred after discussion with heme-onc due to rectal bleeding   -CT abdomen today again showed chronic splenic vein thrombosis   - Will continue to hold anticoagulation at this time      no

## 2023-11-29 NOTE — ED NOTES
Bed: [facility]  Expected date: 11/28/23  Expected time: 8:00 PM  Means of arrival:   Comments:  [place]

## 2023-11-29 NOTE — DISCHARGE SUMMARY
[facility] - Emergency Dept  Hospital Medicine  Discharge Summary      Patient Name: Tasia Cardona  MRN: [id number]  HEATHER: [id number]  Patient Class: OP- Observation  Admission Date: 11/28/2023  Hospital Length of Stay: 0 days  Discharge Date and Time: No discharge date for patient encounter.  Attending Physician: Janey Cho MD   Discharging Provider: Janey Cho MD  Primary Care Provider: Pina Jones NP  Hospital Medicine Team[place] [facility] [place] [facility] [place] Janey Cho MD  Primary Care Team: [facility] MED A    HPI:   Tasia Cardona is a 23 y.o. male PMHx of acute necrotizing pancreatitis in September complicated by peripancreatic and perigastric complex fluid collection, recent hospital admission at [facility] 11/20-11/27 s/p EGD with pancreatic necrosectomy on 11/27, splenic vein thrombosis (discontinued Eliquis), HIV on Biktarvy, and EtOH use who presented to the ED for abdominal pain. Patient was just discharged from [facility] yesterday after pancreatic necrosectomy on 11/27 with follow up CT abdomen as outpatient and he was discharged on oxycodone 10 mg. Patient reports worsening epigastric abdominal pain associated with intractable nausea and vomiting since discharged home. Patient not sure if there was blood in vomiting as it was dark grey in color. He reports unable to keep anything down including pain medicine due to N/V. Reports ~10lb weight loss since September. He endorses subjective chills, but denies fever, sob, chest pain, diarrhea, dysuria or hematuria. Denies tobacco, alcohol or other illicit drug use. He works as a .     ED course: afebrile and vitals stable. No leukocytosis, Hgb stable at 10, LFT and lipase negative. CT abdomen showed interval cystgastrostomy with mid decreased size of peripancreatic fluid collection, splenomegaly with known chronic splenic vein thrombosis. Patient received dilaudid 1 mg IV x 2, zofran 4 mg IV and LR 1 liter bolus in ED. During my interview, patient is  awake, conversant and not in distress. He reports improved with symptoms with above treatment but feels like pain is coming back and he needs more dilaudid.            * No surgery found *      Hospital Course:   11/29- Placed in Obs  for N&V, abd pain, chronic due to Alcoholic necrotizing pancreatitis. CT & lab are stable.  Has stable HIV on biktarvi. Home pain regemin; lyrica, oxycodone 5 and celebrex. Add reglan for N&V. He has 2 anti-emetics already. Looks comfortable. Conversant.   - would not give dilaudid which will make N&V worse. Pt needs to be on a regemin he can take at home. This is chronic pain and pseudocyst is appropriately diminishing in size. Educated pt on what to expect. It may take week to months for this to improve. He has follow up planned on Monday and another procedure is planned. I discussed the  need to gradually wean opioids to prevent worsening long term pain syndrome. Dc IVFs.  Likely can go home. Advance to full liquid diet.  Will keep for monitoring until ready to go home.     CT- In this patient with history of necrotizing pancreatitis with multiple peripancreatic fluid collections, there has been apparent interval cystgastrostomy at outside institution.  Previously demonstrated fluid collections appear similar to mildly decreased in size compared to prior examination.  There is moderate volume of gas now present within the collections which may relate to recent manipulation/cyst-gastrostomy tube placement versus temporal evolution of necrotic pancreatic collections.  No new large discrete collection identified. New minimal patchy subsegmental opacities in the left lower lung lobe which could relate to infection or aspiration.  Splenomegaly with known chronic thrombosis of the splenic vein.  Multiple collateral vessels throughout the upper abdomen. Stable mild nonspecific urinary bladder wall thickening. Minimally prominent fluid-filled loops of small bowel without discrete transition  "point.  This may represent a reactive ileus versus a nonspecific enteritis.     Goals of Care Treatment Preferences:  Code Status: Full Code      Consults:     Neuro  Chronic pain syndrome  Maintain home rgemin  He may add tylenol if he does not drink alcohol. I discussed risk fo lever failure if the alcohol is mixed w tylenol. He expressed unerstanding      Pulmonary  Mild intermittent asthma without complication  -no signs of exacerbation   -duirwin prn       ID  HIV infection  11/12/2023 CD4 1,045   - Continue home biktarvy           Hematology  Splenic vein thrombosis  - Anticoagulation was previously deferred after discussion with heme-onc due to rectal bleeding   -CT abdomen today again showed chronic splenic vein thrombosis   - Will continue to hold anticoagulation at this time       GI  * Fluid collection of pancreas  -initially developed acute alcoholic pancreatitis in September 2023 complicated by necrotizing pancreatitis and peripancreatic fluid collection   -recently admitted to [facility] 11/20-11/27 for above with EUS on 11/21 and EGD pancreatic necrosectomy by GI on 11/27  - Had EGD on11/27 at [facility] - "90% of necrotic material cleared. Healthy appearing granulation tissue in cavity. Replaced plastic double pigtail stent, axios remains in place.  per GI stop abx and IVF, stop acid suppressive therapy, stop carafate and pancreatic enzymes - ok for home from GI standpoint - repeat EGD with necrosectomy next week"    -presented to ED today with worsening epigastric pain and N/V since recent discharged yesterday   -CT today showed interval gastrocystostomy and mild decreased in size of peripancreatic fluid collection   -afebrile and no leukocytosis   -clinically improved with IV dilaudid 1 mg x 2, zofran and IVF given in ED   -there is concern for drug seeking behavior documented at [facility] as patient would set up alarm on his phone for IV narcotics though he did not appear to be in pain   -will order oxycodone and IV " dilaudid severe pain with close monitoring   -clear liquid diet and IVF   -consider AES consult if worsening of clinical status       Nausea & vomiting  Try reglan      Chronic pancreatitis  Due alcohol necrotizing pancreatitis with pseudocyst   S/p peripancreatic and perigastric complex fluid collection, recent hospital admission at [facility] 11/20-11/27   s/p EGD with pancreatic necrosectomy on 11/27,   splenic vein thrombosis (discontinued Eliquis),   - cyst is diminishing in size.        Final Active Diagnoses:    Diagnosis Date Noted POA    PRINCIPAL PROBLEM:  Fluid collection of pancreas [K86.89] 09/30/2023 Yes    Chronic pain syndrome [G89.4] 11/29/2023 Yes    Chronic pancreatitis [K86.1] 09/20/2023 Yes    Nausea & vomiting [R11.2] 12/17/2022 Yes    Mild intermittent asthma without complication [J45.20] 09/18/2023 Yes     Chronic    Splenic vein thrombosis [I82.890] 09/20/2023 Yes    HIV infection [B20] 11/02/2021 Yes      Problems Resolved During this Admission:       Discharged Condition: fair    Disposition: Home or Self Care    Follow Up:    Patient Instructions:   No discharge procedures on file.    Significant Diagnostic Studies: Labs: CMP   Recent Labs   Lab 11/27/23 2028 11/28/23 2118    139   K 3.9 3.5    105   CO2 20* 23   GLU 99 98   BUN 5* 5*   CREATININE 0.7 0.6   CALCIUM 9.2 9.5   PROT 7.6 8.0   ALBUMIN 3.2* 3.5   BILITOT 0.6 0.6   ALKPHOS 96 94   AST 19 12   ALT 13 12   ANIONGAP 13 11    and CBC   Recent Labs   Lab 11/27/23 2028 11/28/23 2118   WBC 7.99 8.08   HGB 9.9* 10.1*   HCT 31.9* 33.0*    314       Pending Diagnostic Studies:       None           Medications:  Reconciled Home Medications:      Medication List        START taking these medications      metoclopramide HCl 5 mg/5 mL Soln  Commonly known as: REGLAN  Take 10 mLs (10 mg total) by mouth every 6 (six) hours as needed.     senna-docusate 8.6-50 mg 8.6-50 mg per tablet  Commonly known as: PERICOLACE  Take 2  tablets by mouth 2 (two) times daily as needed for Constipation.            CONTINUE taking these medications      BIKTARVY -25 mg (25 kg or greater)  Generic drug: szdewkeiv-ybfdocrg-shrdkca ala  Take 1 tablet by mouth once daily.     folic acid 1 MG tablet  Commonly known as: FOLVITE  Take 1 tablet (1 mg total) by mouth once daily.     oxyCODONE 5 MG immediate release tablet  Commonly known as: ROXICODONE  Take 1 tablet (5 mg total) by mouth every 4 (four) hours as needed for Pain.     pantoprazole 40 MG tablet  Commonly known as: PROTONIX  Take 1 tablet (40 mg total) by mouth 2 (two) times daily.     polyethylene glycol 17 gram Pwpk  Commonly known as: GLYCOLAX  Take 17 g by mouth daily as needed for Constipation.     promethazine 25 MG tablet  Commonly known as: PHENERGAN  Take 25 mg by mouth every 4 (four) hours as needed for Nausea.              Indwelling Lines/Drains at time of discharge:   Lines/Drains/Airways       None                   Time spent on the discharge of patient: 35 minutes         Janey Cho MD  Department of Hospital Medicine  [facility] - Emergency Dept

## 2023-11-29 NOTE — ASSESSMENT & PLAN NOTE
Due alcohol necrotizing pancreatitis with pseudocyst   S/p peripancreatic and perigastric complex fluid collection, recent hospital admission at [facility] 11/20-11/27   s/p EGD with pancreatic necrosectomy on 11/27,   splenic vein thrombosis (discontinued Eliquis),   - cyst is diminishing in size.

## 2023-11-29 NOTE — PHARMACY MED REC
"Admission Medication History     The home medication history was taken by Radha Chacon.    You may go to "Admission" then "Reconcile Home Medications" tabs to review and/or act upon these items.     The home medication list has been updated by the Pharmacy department.   Please read ALL comments highlighted in yellow.   Please address this information as you see fit.    Feel free to contact us if you have any questions or require assistance.      The medications listed below were removed from the home medication list. Please reorder if appropriate:  Patient reports no longer taking the following medication(s):  CELECOXIB 200 MG  LIPASE-PROTEASE-AMYLASE 6,000-19,000-30,000 UNITS  ONDANSETRON ODT 4 MG  PREGABALIN 150 MG    Medications listed below were obtained from: Patient  Current Outpatient Medications on File Prior to Encounter   Medication Sig    mbsqejbbm-vyrtgolx-uczktlb ala (BIKTARVY) -25 mg (25 kg or greater) Take 1 tablet by mouth once daily.    folic acid (FOLVITE) 1 MG tablet Take 1 tablet (1 mg total) by mouth once daily.    oxyCODONE (ROXICODONE) 5 MG immediate release tablet Take 1 tablet (5 mg total) by mouth every 4 (four) hours as needed for Pain.    polyethylene glycol (GLYCOLAX) 17 gram PwPk Take 17 g by mouth daily as needed for Constipation.    promethazine (PHENERGAN) 25 MG tablet Take 25 mg by mouth every 4 (four) hours as needed for Nausea.    pantoprazole (PROTONIX) 40 MG tablet Take 1 tablet (40 mg total) by mouth 2 (two) times daily.     Potential issues to be addressed PRIOR TO DISCHARGE  Patient requested refills for the following medications: (FOLVITE)            Radha Chacon  EXT [id number]                  .          "

## 2023-11-29 NOTE — HOSPITAL COURSE
11/29- Placed in Obs  for N&V, abd pain, chronic due to Alcoholic necrotizing pancreatitis. CT & lab are stable.  Has stable HIV on biktarvi. Home pain regemin; lyrica, oxycodone 5 and celebrex. Add reglan for N&V. He has 2 anti-emetics already. Looks comfortable. Conversant.   - would not give dilaudid which will make N&V worse. Pt needs to be on a regemin he can take at home. This is chronic pain and pseudocyst is appropriately diminishing in size. Educated pt on what to expect. It may take week to months for this to improve. He has follow up planned on Monday and another procedure is planned. I discussed the  need to gradually wean opioids to prevent worsening long term pain syndrome. Dc IVFs.  Likely can go home. Advance to full liquid diet.  Will keep for monitoring until ready to go home.     CT- In this patient with history of necrotizing pancreatitis with multiple peripancreatic fluid collections, there has been apparent interval cystgastrostomy at outside institution.  Previously demonstrated fluid collections appear similar to mildly decreased in size compared to prior examination.  There is moderate volume of gas now present within the collections which may relate to recent manipulation/cyst-gastrostomy tube placement versus temporal evolution of necrotic pancreatic collections.  No new large discrete collection identified. New minimal patchy subsegmental opacities in the left lower lung lobe which could relate to infection or aspiration.  Splenomegaly with known chronic thrombosis of the splenic vein.  Multiple collateral vessels throughout the upper abdomen. Stable mild nonspecific urinary bladder wall thickening. Minimally prominent fluid-filled loops of small bowel without discrete transition point.  This may represent a reactive ileus versus a nonspecific enteritis.

## 2023-11-29 NOTE — SUBJECTIVE & OBJECTIVE
Interval History: see above    Review of Systems   Constitutional:  Positive for activity change and appetite change. Negative for fever.   HENT:  Negative for trouble swallowing.    Respiratory:  Negative for shortness of breath.    Cardiovascular:  Negative for chest pain.   Gastrointestinal:  Positive for abdominal pain (chronic), nausea and vomiting.   Genitourinary:  Negative for difficulty urinating.   Musculoskeletal:  Negative for neck stiffness.   Neurological:  Negative for headaches.   Psychiatric/Behavioral:  Negative for agitation, behavioral problems and confusion.      Objective:     Vital Signs (Most Recent):  Temp: 98.2 °F (36.8 °C) (11/29/23 0818)  Pulse: 77 (11/29/23 0818)  Resp: 20 (11/29/23 0910)  BP: (!) 109/59 (11/29/23 0818)  SpO2: 97 % (11/29/23 0818) Vital Signs (24h Range):  Temp:  [97.3 °F (36.3 °C)-99.1 °F (37.3 °C)] 98.2 °F (36.8 °C)  Pulse:  [] 77  Resp:  [14-20] 20  SpO2:  [97 %-100 %] 97 %  BP: ()/(59-69) 109/59     Weight: 70.3 kg (155 lb)  Body mass index is 22.24 kg/m².    Intake/Output Summary (Last 24 hours) at 11/29/2023 1013  Last data filed at 11/28/2023 2250  Gross per 24 hour   Intake 1000 ml   Output --   Net 1000 ml      Physical Exam  Constitutional:       General: He is not in acute distress.     Appearance: Normal appearance. He is ill-appearing. He is not toxic-appearing or diaphoretic.   HENT:      Head: Normocephalic and atraumatic.      Nose: Nose normal.      Mouth/Throat:      Mouth: Mucous membranes are moist.      Pharynx: Oropharynx is clear.   Eyes:      General: No scleral icterus.     Extraocular Movements: Extraocular movements intact.      Conjunctiva/sclera: Conjunctivae normal.      Pupils: Pupils are equal, round, and reactive to light.   Cardiovascular:      Rate and Rhythm: Normal rate and regular rhythm.      Pulses: Normal pulses.      Heart sounds: Normal heart sounds.   Pulmonary:      Effort: Pulmonary effort is normal. No  respiratory distress.      Breath sounds: Normal breath sounds. No stridor. No wheezing, rhonchi or rales.   Chest:      Chest wall: No tenderness.   Abdominal:      General: Abdomen is flat. Bowel sounds are normal. There is no distension.      Palpations: Abdomen is soft.      Tenderness: There is no abdominal tenderness. There is no right CVA tenderness, guarding or rebound.   Musculoskeletal:         General: No swelling, tenderness, deformity or signs of injury. Normal range of motion.      Cervical back: Normal range of motion and neck supple. No rigidity or tenderness.      Right lower leg: No edema.      Left lower leg: No edema.   Skin:     General: Skin is warm and dry.      Coloration: Skin is not jaundiced.      Findings: No erythema or rash.   Neurological:      General: No focal deficit present.      Mental Status: He is alert and oriented to person, place, and time. Mental status is at baseline.      Motor: No weakness.   Psychiatric:         Mood and Affect: Mood normal.         Behavior: Behavior normal.         Thought Content: Thought content normal.         Judgment: Judgment normal.         MELD 3.0: 8 at 11/28/2023  9:18 PM  MELD-Na: 8 at 11/28/2023  9:18 PM  Calculated from:  Serum Creatinine: 0.6 mg/dL (Using min of 1 mg/dL) at 11/28/2023  9:18 PM  Serum Sodium: 139 mmol/L (Using max of 137 mmol/L) at 11/28/2023  9:18 PM  Total Bilirubin: 0.6 mg/dL (Using min of 1 mg/dL) at 11/28/2023  9:18 PM  Serum Albumin: 3.5 g/dL at 11/28/2023  9:18 PM  INR(ratio): 1.2 at 11/28/2023  9:18 PM  Age at listing (hypothetical): 23 years  Sex: Male at 11/28/2023  9:18 PM      Significant Labs:  CBC:  Recent Labs   Lab 11/27/23 2028 11/28/23 2118   WBC 7.99 8.08   HGB 9.9* 10.1*   HCT 31.9* 33.0*    314     CMP:  Recent Labs   Lab 11/27/23 2028 11/28/23 2118    139   K 3.9 3.5    105   CO2 20* 23   GLU 99 98   BUN 5* 5*   CREATININE 0.7 0.6   CALCIUM 9.2 9.5   PROT 7.6 8.0   ALBUMIN 3.2*  3.5   BILITOT 0.6 0.6   ALKPHOS 96 94   AST 19 12   ALT 13 12   ANIONGAP 13 11     PTINR:  Recent Labs   Lab 11/28/23  2118   INR 1.2

## 2023-11-30 NOTE — PLAN OF CARE
[facility] - Emergency Dept  Initial Discharge Assessment       Primary Care Provider: Pina Jones NP    Admission Diagnosis: Tachycardia [R00.0]    Admission Date: 11/28/2023  Expected Discharge Date: 11/29/2023    Transition of Care Barriers: (P) None    Payor: /     Extended Emergency Contact Information  Primary Emergency Contact: Denice Cardona  Mobile Phone: [phone]  Relation: Mother  Preferred language: English   needed? No    Discharge Plan A: (P) Home         [id number] - [place], [place] - [address] [facility]  [address]  [facility] [address]  Phone: [phone] Fax: [phone]    [facility]  [place] - [address]  [address] [facility] [place] [address]  Phone: [phone] Fax: [phone]      Initial Assessment (most recent)       Adult Discharge Assessment - 11/29/23 1918          Discharge Assessment    Assessment Type Discharge Planning Assessment (P)      Confirmed/corrected address, phone number and insurance Yes (P)      Source of Information patient (P)      Prior to hospitilization cognitive status: Alert/Oriented (P)      Current cognitive status: Alert/Oriented (P)      Equipment Currently Used at Home none (P)      Patient currently being followed by outpatient case management? No (P)      Do you currently have service(s) that help you manage your care at home? No (P)      Do you take prescription medications? Yes (P)      Do you have prescription coverage? Yes (P)      Do you have any problems affording any of your prescribed medications? No (P)      Is the patient taking medications as prescribed? yes (P)      How do you get to doctors appointments? family or friend will provide;car, drives self (P)      DME Needed Upon Discharge  none (P)      Discharge Plan discussed with: Patient (P)      Transition of Care Barriers None (P)      Discharge Plan A Home (P)          Housing Stability    In the last 12 months, was there a time when you were not able to pay the mortgage or rent on time? No (P)      In the last 12 months, was there a time when you did not have a steady place to sleep or slept in a shelter (including now)? No (P)         Transportation Needs    In the past 12 months, has lack of transportation kept you from medical appointments or from getting medications? No (P)      In the past 12 months, has lack of transportation kept you from meetings, work, or from getting things needed for daily living? No (P)         Food Insecurity    Within the past 12 months, you worried that your food would run out before you got the money to buy more. Never true (P)

## 2023-11-30 NOTE — PLAN OF CARE
Patient admitted on: 11-27-23  Pharmacy updated in Epic: [facility]      Current Dispo: home tonight  Eager to d/c, self reports no case mgt needs  Transportation: has reliable  Case management to follow for plans and arrangements as needed

## 2023-12-04 LAB — FUNGUS SPEC CULT: NORMAL

## 2023-12-04 PROCEDURE — 93005 ELECTROCARDIOGRAM TRACING: CPT

## 2023-12-04 PROCEDURE — 93010 ELECTROCARDIOGRAM REPORT: CPT | Mod: ,,, | Performed by: INTERNAL MEDICINE

## 2023-12-04 PROCEDURE — 93010 EKG 12-LEAD: ICD-10-PCS | Mod: ,,, | Performed by: INTERNAL MEDICINE

## 2023-12-04 PROCEDURE — 99285 EMERGENCY DEPT VISIT HI MDM: CPT | Mod: 25

## 2023-12-05 ENCOUNTER — HOSPITAL ENCOUNTER (EMERGENCY)
Facility: HOSPITAL | Age: 23
Discharge: HOME | End: 2023-12-05
Attending: EMERGENCY MEDICINE
Payer: MEDICAID

## 2023-12-05 VITALS
TEMPERATURE: 99 F | HEIGHT: 70 IN | BODY MASS INDEX: 22.19 KG/M2 | HEART RATE: 77 BPM | SYSTOLIC BLOOD PRESSURE: 108 MMHG | DIASTOLIC BLOOD PRESSURE: 64 MMHG | RESPIRATION RATE: 17 BRPM | WEIGHT: 155 LBS | OXYGEN SATURATION: 96 %

## 2023-12-05 DIAGNOSIS — Z76.5 DRUG-SEEKING BEHAVIOR: ICD-10-CM

## 2023-12-05 DIAGNOSIS — K52.9 GASTROENTERITIS: Primary | ICD-10-CM

## 2023-12-05 DIAGNOSIS — R00.0 TACHYCARDIA: ICD-10-CM

## 2023-12-05 DIAGNOSIS — E87.6 HYPOKALEMIA: ICD-10-CM

## 2023-12-05 DIAGNOSIS — K86.1 CHRONIC PANCREATITIS, UNSPECIFIED PANCREATITIS TYPE: ICD-10-CM

## 2023-12-05 LAB
ABO + RH BLD: NORMAL
ALBUMIN SERPL BCP-MCNC: 3.1 G/DL (ref 3.5–5.2)
ALP SERPL-CCNC: 68 U/L (ref 55–135)
ALT SERPL W/O P-5'-P-CCNC: 6 U/L (ref 10–44)
ANION GAP SERPL CALC-SCNC: 13 MMOL/L (ref 8–16)
AST SERPL-CCNC: 11 U/L (ref 10–40)
BASOPHILS # BLD AUTO: 0.02 K/UL (ref 0–0.2)
BASOPHILS NFR BLD: 0.2 % (ref 0–1.9)
BILIRUB SERPL-MCNC: 0.2 MG/DL (ref 0.1–1)
BLD GP AB SCN CELLS X3 SERPL QL: NORMAL
BUN SERPL-MCNC: <3 MG/DL (ref 6–20)
CALCIUM SERPL-MCNC: 8.8 MG/DL (ref 8.7–10.5)
CHLORIDE SERPL-SCNC: 103 MMOL/L (ref 95–110)
CO2 SERPL-SCNC: 23 MMOL/L (ref 23–29)
CREAT SERPL-MCNC: 0.6 MG/DL (ref 0.5–1.4)
DIFFERENTIAL METHOD: ABNORMAL
EOSINOPHIL # BLD AUTO: 0.3 K/UL (ref 0–0.5)
EOSINOPHIL NFR BLD: 2.4 % (ref 0–8)
ERYTHROCYTE [DISTWIDTH] IN BLOOD BY AUTOMATED COUNT: 18.3 % (ref 11.5–14.5)
EST. GFR  (NO RACE VARIABLE): >60 ML/MIN/1.73 M^2
GLUCOSE SERPL-MCNC: 104 MG/DL (ref 70–110)
HCT VFR BLD AUTO: 32.4 % (ref 40–54)
HGB BLD-MCNC: 9.8 G/DL (ref 14–18)
IMM GRANULOCYTES # BLD AUTO: 0.05 K/UL (ref 0–0.04)
IMM GRANULOCYTES NFR BLD AUTO: 0.5 % (ref 0–0.5)
LIPASE SERPL-CCNC: 12 U/L (ref 4–60)
LYMPHOCYTES # BLD AUTO: 2.6 K/UL (ref 1–4.8)
LYMPHOCYTES NFR BLD: 24.5 % (ref 18–48)
MCH RBC QN AUTO: 25.1 PG (ref 27–31)
MCHC RBC AUTO-ENTMCNC: 30.2 G/DL (ref 32–36)
MCV RBC AUTO: 83 FL (ref 82–98)
MONOCYTES # BLD AUTO: 0.6 K/UL (ref 0.3–1)
MONOCYTES NFR BLD: 5.4 % (ref 4–15)
NEUTROPHILS # BLD AUTO: 7.1 K/UL (ref 1.8–7.7)
NEUTROPHILS NFR BLD: 67 % (ref 38–73)
NRBC BLD-RTO: 0 /100 WBC
PLATELET # BLD AUTO: 373 K/UL (ref 150–450)
PMV BLD AUTO: 10.2 FL (ref 9.2–12.9)
POTASSIUM SERPL-SCNC: 3.3 MMOL/L (ref 3.5–5.1)
PROT SERPL-MCNC: 7.5 G/DL (ref 6–8.4)
RBC # BLD AUTO: 3.91 M/UL (ref 4.6–6.2)
SODIUM SERPL-SCNC: 139 MMOL/L (ref 136–145)
SPECIMEN OUTDATE: NORMAL
WBC # BLD AUTO: 10.65 K/UL (ref 3.9–12.7)

## 2023-12-05 PROCEDURE — 25000003 PHARM REV CODE 250

## 2023-12-05 PROCEDURE — 25500020 PHARM REV CODE 255: Performed by: EMERGENCY MEDICINE

## 2023-12-05 PROCEDURE — 80053 COMPREHEN METABOLIC PANEL: CPT | Performed by: EMERGENCY MEDICINE

## 2023-12-05 PROCEDURE — 96376 TX/PRO/DX INJ SAME DRUG ADON: CPT

## 2023-12-05 PROCEDURE — 63600175 PHARM REV CODE 636 W HCPCS

## 2023-12-05 PROCEDURE — 96361 HYDRATE IV INFUSION ADD-ON: CPT

## 2023-12-05 PROCEDURE — 96375 TX/PRO/DX INJ NEW DRUG ADDON: CPT

## 2023-12-05 PROCEDURE — 83690 ASSAY OF LIPASE: CPT | Performed by: EMERGENCY MEDICINE

## 2023-12-05 PROCEDURE — 96374 THER/PROPH/DIAG INJ IV PUSH: CPT

## 2023-12-05 PROCEDURE — 86901 BLOOD TYPING SEROLOGIC RH(D): CPT

## 2023-12-05 PROCEDURE — 85025 COMPLETE CBC W/AUTO DIFF WBC: CPT | Performed by: EMERGENCY MEDICINE

## 2023-12-05 RX ORDER — HYDROMORPHONE HYDROCHLORIDE 1 MG/ML
1 INJECTION, SOLUTION INTRAMUSCULAR; INTRAVENOUS; SUBCUTANEOUS
Status: COMPLETED | OUTPATIENT
Start: 2023-12-05 | End: 2023-12-05

## 2023-12-05 RX ORDER — ONDANSETRON 2 MG/ML
4 INJECTION INTRAMUSCULAR; INTRAVENOUS
Status: COMPLETED | OUTPATIENT
Start: 2023-12-05 | End: 2023-12-05

## 2023-12-05 RX ORDER — HYDROMORPHONE HYDROCHLORIDE 1 MG/ML
0.5 INJECTION, SOLUTION INTRAMUSCULAR; INTRAVENOUS; SUBCUTANEOUS
Status: COMPLETED | OUTPATIENT
Start: 2023-12-05 | End: 2023-12-05

## 2023-12-05 RX ORDER — MORPHINE SULFATE 2 MG/ML
6 INJECTION, SOLUTION INTRAMUSCULAR; INTRAVENOUS
Status: COMPLETED | OUTPATIENT
Start: 2023-12-05 | End: 2023-12-05

## 2023-12-05 RX ADMIN — SODIUM CHLORIDE 1000 ML: 9 INJECTION, SOLUTION INTRAVENOUS at 01:12

## 2023-12-05 RX ADMIN — IOHEXOL 75 ML: 350 INJECTION, SOLUTION INTRAVENOUS at 03:12

## 2023-12-05 RX ADMIN — POTASSIUM BICARBONATE 30 MEQ: 391 TABLET, EFFERVESCENT ORAL at 03:12

## 2023-12-05 RX ADMIN — HYDROMORPHONE HYDROCHLORIDE 0.5 MG: 1 INJECTION, SOLUTION INTRAMUSCULAR; INTRAVENOUS; SUBCUTANEOUS at 06:12

## 2023-12-05 RX ADMIN — ONDANSETRON 4 MG: 2 INJECTION INTRAMUSCULAR; INTRAVENOUS at 03:12

## 2023-12-05 RX ADMIN — ONDANSETRON 4 MG: 2 INJECTION INTRAMUSCULAR; INTRAVENOUS at 01:12

## 2023-12-05 RX ADMIN — MORPHINE SULFATE 6 MG: 2 INJECTION, SOLUTION INTRAMUSCULAR; INTRAVENOUS at 01:12

## 2023-12-05 RX ADMIN — HYDROMORPHONE HYDROCHLORIDE 1 MG: 1 INJECTION, SOLUTION INTRAMUSCULAR; INTRAVENOUS; SUBCUTANEOUS at 03:12

## 2023-12-05 NOTE — ED NOTES
Pt return from CT. Pt states in 10/10 pain and requesting medication and water. MD notified.  No new orders at this time.

## 2023-12-05 NOTE — ED NOTES
Tasia Cardona, a 23 y.o. male presents to the ED w/ complaint of pancreatic stents replaced today at [facility]. Now N/V/D. States emesis has moderate amounts bright red blood. Covid,HIV + , polycythemia     Triage note:  Chief Complaint   Patient presents with    Post-op Problem     Pt had pancreatic stents replaced today at [facility]. Now N/V/D. States emesis has moderate amounts bright red blood. Covid and HIV +     Review of patient's allergies indicates:   Allergen Reactions    Clayton Swelling    Pcn [penicillins] Hives     Tolerates cephalosporins    Pecan nut Swelling    Sulfa (sulfonamide antibiotics) Hives     Past Medical History:   Diagnosis Date    Acute necrotizing pancreatitis 09/20/2023    Alcohol use disorder 10/05/2023    Asthma     Hepatitis C antibody positive in blood 09/18/2023 9/2023 PCR negative    HIV infection 10/2021    Corinne-Chauhna tear 09/18/2023    Normocytic anemia 09/18/2023    Pancreatic pseudocyst/cyst 10/24/2023    Polycythemia vera 11/28/2021    Receives phlebotomy if Hct>45    Positive CMV IgG serology 09/21/2023    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

## 2023-12-05 NOTE — Clinical Note
"Tasia Olivarez" Dulce was seen and treated in our emergency department on 12/4/2023.  He may return to work on 12/07/2023.       If you have any questions or concerns, please don't hesitate to call.      Ashley Culver MD"

## 2023-12-05 NOTE — DISCHARGE INSTRUCTIONS
Diagnosis:  Vomiting and diarrhea    Tests today showed:   Labs Reviewed   CBC W/ AUTO DIFFERENTIAL - Abnormal; Notable for the following components:       Result Value    RBC 3.91 (*)     Hemoglobin 9.8 (*)     Hematocrit 32.4 (*)     MCH 25.1 (*)     MCHC 30.2 (*)     RDW 18.3 (*)     Immature Grans (Abs) 0.05 (*)     All other components within normal limits   COMPREHENSIVE METABOLIC PANEL - Abnormal; Notable for the following components:    Potassium 3.3 (*)     BUN <3 (*)     Albumin 3.1 (*)     ALT 6 (*)     All other components within normal limits   LIPASE   TYPE & SCREEN     CT Abdomen Pelvis With IV Contrast NO Oral Contrast   Final Result      1. Sequelae of chronic pancreatitis with interval decreased size of peripancreatic fluid collection with cystagastrostomy tube in place.  Small perihepatic fluid collection appears similar in size.  No definite new fluid collections.   2. Hepatosplenomegaly.   3. Similar appearance of mm prominent fluid-filled loops of bowel throughout the abdomen pelvis.  No definite transition point.  Findings potentially representing reactive ileus versus nonspecific enteritis.   4. Multiple collateral vessels in the left upper quadrant with known thrombosis of the splenic vein.   5. Additional findings, as above.      Electronically signed by resident: Gayathri Cifuentes   Date:    12/05/2023   Time:    05:06      Electronically signed by: Leighann Rodriguez MD   Date:    12/05/2023   Time:    05:56          Treatments you had today:   Medications   HYDROmorphone injection 0.5 mg (has no administration in time range)   sodium chloride 0.9% bolus 1,000 mL 1,000 mL (0 mLs Intravenous Stopped 12/5/23 0353)   morphine injection 6 mg (6 mg Intravenous Given 12/5/23 0133)   ondansetron injection 4 mg (4 mg Intravenous Given 12/5/23 0134)   potassium bicarbonate disintegrating tablet 30 mEq (30 mEq Oral Given 12/5/23 0354)   iohexoL (OMNIPAQUE 350) injection 75 mL (75 mLs Intravenous Given 12/5/23  1258)   HYDROmorphone injection 1 mg (1 mg Intravenous Given 12/5/23 0027)   ondansetron injection 4 mg (4 mg Intravenous Given 12/5/23 3963)       Follow-Up Plan:  - Follow-up with primary care doctor within 3 - 5 days  - Additional testing and/or evaluation as directed by your primary doctor    Return to the Emergency Department for symptoms including but not limited to: worsening symptoms, shortness of breath or chest pain, vomiting with inability to hold down fluids, fevers greater than 100.4°F, passing out/fainting/unconsciousness, or other concerning symptoms.

## 2023-12-05 NOTE — ED PROVIDER NOTES
Encounter Date: 12/4/2023       History     Chief Complaint   Patient presents with    Post-op Problem     Pt had pancreatic stents replaced today at [facility]. Now N/V/D. States emesis has moderate amounts bright red blood. 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  Reports blood-streaked emesis.  He reports history of hematemesis in the setting of pancreatitis.  States symptoms started today after his procedure.  He reports about 4-5 episodes of vomiting.  Two episodes of diarrhea which are black.  No fevers, chest pain or shortness of breath.  He is also COVID positive.  This was diagnosed during his recent hospitalization when he was admitted for the pancreatitis.  Attempted pain and nausea medications orally prior to arrival but immediately vomited them back up.  He reports diffuse, severe abdominal pain, rated as a 10/10 in severity.    The history is provided by the patient. No  was used.     Review of patient's allergies indicates:   Allergen Reactions    [place] Swelling    Pcn [penicillins] Hives     Tolerates cephalosporins    Pecan nut Swelling    Sulfa (sulfonamide antibiotics) Hives     Past Medical History:   Diagnosis Date    Acute necrotizing pancreatitis 09/20/2023    Alcohol use disorder 10/05/2023    Asthma     Hepatitis C antibody positive in blood 09/18/2023 9/2023 PCR negative    HIV infection 10/2021    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     Past Surgical History:   Procedure Laterality Date    ENDOSCOPIC ULTRASOUND OF UPPER GASTROINTESTINAL TRACT  N/A 10/23/2023    Procedure: ULTRASOUND, UPPER GI TRACT, ENDOSCOPIC;  Surgeon: Emil Villatoro MD;  Location: [facility] ENDO (2ND FLR);  Service: Endoscopy;  Laterality: N/A;    ERCP N/A 10/23/2023    Procedure: ERCP (ENDOSCOPIC RETROGRADE CHOLANGIOPANCREATOGRAPHY);  Surgeon: Emil Villatoro MD;  Location: [facility] ENDO (2ND FLR);  Service: Endoscopy;  Laterality: N/A;    ESOPHAGOGASTRODUODENOSCOPY N/A 9/18/2023    Procedure: EGD (ESOPHAGOGASTRODUODENOSCOPY);  Surgeon: Kg Cleaning MD;  Location: [facility] ENDO (2ND FLR);  Service: Endoscopy;  Laterality: N/A;     Family History   Problem Relation Age of Onset    Pancreatitis Mother     Cancer Mother     Ovarian cancer Mother     No Known Problems Father     Cancer Brother     Breast cancer Maternal Grandmother      Social History     Tobacco Use    Smoking status: Former     Types: Cigarettes    Smokeless tobacco: Never   Substance Use Topics    Alcohol use: Not Currently    Drug use: Never       Physical Exam     Initial Vitals [12/04/23 2149]   BP Pulse Resp Temp SpO2   (!) 121/56 (!) 115 16 99.1 °F (37.3 °C) 100 %      MAP       --         Physical Exam    Nursing note and vitals reviewed.  Constitutional: He appears well-developed and well-nourished. He is not diaphoretic. No distress.   HENT:   Head: Normocephalic and atraumatic.   Eyes: Conjunctivae and EOM are normal.   Neck: Neck supple.   Normal range of motion.  Cardiovascular:  Normal rate, regular rhythm, normal heart sounds and intact distal pulses.           Pulmonary/Chest: Breath sounds normal. No respiratory distress. He has no wheezes. He has no rhonchi. He has no rales.   Abdominal: Abdomen is soft. Bowel sounds are normal. He exhibits no distension. There is abdominal tenderness.   Abdomen diffusely tender, most significantly in the left lower quadrant There is guarding. There is no rebound.   Musculoskeletal:         General: No tenderness or edema. Normal range of motion.      Cervical back: Normal range  of motion and neck supple.     Neurological: He is alert. He has normal strength.   Skin: Skin is warm and dry.   Psychiatric: He has a normal mood and affect. Thought content normal.         ED Course   Procedures  Labs Reviewed   CBC W/ AUTO DIFFERENTIAL - Abnormal; Notable for the following components:       Result Value    RBC 3.91 (*)     Hemoglobin 9.8 (*)     Hematocrit 32.4 (*)     MCH 25.1 (*)     MCHC 30.2 (*)     RDW 18.3 (*)     Immature Grans (Abs) 0.05 (*)     All other components within normal limits   COMPREHENSIVE METABOLIC PANEL - Abnormal; Notable for the following components:    Potassium 3.3 (*)     BUN <3 (*)     Albumin 3.1 (*)     ALT 6 (*)     All other components within normal limits   LIPASE   TYPE & SCREEN        ECG Results              EKG 12-lead (Final result)  Result time 12/06/23 00:09:20      Final result by Interface, Lab In [facility] (12/06/23 00:09:20)                   Narrative:    Test Reason : R00.0,    Vent. Rate : 103 BPM     Atrial Rate : 103 BPM     P-R Int : 138 ms          QRS Dur : 088 ms      QT Int : 330 ms       P-R-T Axes : 071 -15 -37 degrees     QTc Int : 432 ms    Sinus tachycardia  T wave abnormality, consider anterolateral ischemia  Abnormal ECG  When compared with ECG of 27-NOV-2023 18:44,  Nonspecific T wave abnormality, worse in Inferior leads  T wave inversion now evident in Anterior-lateral leads  Confirmed by RAVEN BETANCUR MD ([id number]) on 12/6/2023 12:09:10 AM    Referred By: [email or web address]   SELF           Confirmed By:RAVEN BETANCUR MD                                  Imaging Results              CT Abdomen Pelvis With IV Contrast NO Oral Contrast (Final result)  Result time 12/05/23 05:56:22      Final result by Leighann Rodriguez MD (12/05/23 05:56:22)                   Impression:      1. Sequelae of chronic pancreatitis with interval decreased size of peripancreatic fluid collection with cystagastrostomy tube in place.  Small perihepatic fluid collection appears  similar in size.  No definite new fluid collections.  2. Hepatosplenomegaly.  3. Similar appearance of mm prominent fluid-filled loops of bowel throughout the abdomen pelvis.  No definite transition point.  Findings potentially representing reactive ileus versus nonspecific enteritis.  4. Multiple collateral vessels in the left upper quadrant with known thrombosis of the splenic vein.  5. Additional findings, as above.    Electronically signed by resident: Gayathri Cifuentes  Date:    12/05/2023  Time:    05:06    Electronically signed by: Leighann Rodriguez MD  Date:    12/05/2023  Time:    05:56               Narrative:    EXAMINATION:  CT ABDOMEN PELVIS WITH IV CONTRAST    CLINICAL HISTORY:  Nausea/vomiting;    TECHNIQUE:  Routine axial CT images of the abdomen were obtained after administration 75cc of IV Omnipaque 350.  Coronal and Sagittal reformatted images were also obtained.    COMPARISON:  CT abdomen pelvis 11/28/2023.    FINDINGS:  Heart: Normal in size with no pericardial effusion.    Lungs: Bilateral bandlike opacifications in the lung bases favored to represent scarring or subsegmental atelectasis.  No pleural effusion.  No focal consolidation.    Liver: Hepatomegaly measuring 21.8 cm.  Geographic hypoattenuation along the falciform ligament, likely representing focal fatty infiltration.    Gallbladder/biliary system: No calcified gallstones.  No intrahepatic or extrahepatic ductal dilatation.    Pancreas: Sequelae of pancreatitis with multiple peripancreatic fluid collections and cystgastrostomy tube in place.  Interval decreased size of collection in the anterior midline abdomen within which the cystgastrostomy tube is located now measuring 2.8 x 1.8 cm (axial series 2, image 75), previously 5.1 x 3.2 cm.  Similar size of additional peripancreatic fluid collection measuring 2.7 x 7.5 cm (axial series 2, image 67), previously 2.8 x 7.5 cm.  Stable size of fluid collection along the medial aspect of the liver  measuring 1.7 x 0.7 cm (axial series 2, image 48), previously 1.7 x 0.6 cm.  No new fluid collections identified.    Spleen: Enlarged measuring 13.8 cm.    GI Tract/ Mesentery: The stomach is unremarkable.  Minimally prominent filled loops of bowel throughout the abdomen and pelvis, similar to prior.  No definite transition point.  Findings potentially representing reactive ileus versus nonspecific enteritis.  Similar prominence of the fluid-filled appendix, unchanged from prior and without adjacent inflammatory changes.    Adrenals: Unremarkable.    Kidneys/ Ureters: Normal in size and location. No hydronephrosis or nephrolithiasis. Both ureters are normal in course caliber with no ureteral stones or hydroureter.    Bladder: Normal in contour with no bladder wall thickening.    Reproductive organs: Prostate is unremarkable.    Retroperitoneum: No significant adenopathy.    Peritoneal space: No ascites. No free air.    Abdominal wall: Tiny fat containing umbilical hernia.    Vasculature: No calcific atherosclerosis of the abdominal aorta.  No evidence of aneurysmal dilatation.  Multiple collateral vessels in the left upper quadrant.  The portal vasculature and SMV are patent.  Known thrombus within the splenic vein.  Incidental retroaortic left renal vein.    Bones: Minimal degenerative disease of the spine with no acute fractures or lytic lesions.                                       Medications   sodium chloride 0.9% bolus 1,000 mL 1,000 mL (0 mLs Intravenous Stopped 12/5/23 0353)   morphine injection 6 mg (6 mg Intravenous Given 12/5/23 0133)   ondansetron injection 4 mg (4 mg Intravenous Given 12/5/23 0134)   potassium bicarbonate disintegrating tablet 30 mEq (30 mEq Oral Given 12/5/23 0354)   iohexoL (OMNIPAQUE 350) injection 75 mL (75 mLs Intravenous Given 12/5/23 0318)   HYDROmorphone injection 1 mg (1 mg Intravenous Given 12/5/23 0353)   ondansetron injection 4 mg (4 mg Intravenous Given 12/5/23 0354)    HYDROmorphone injection 0.5 mg (0.5 mg Intravenous Given 12/5/23 0612)     Medical Decision Making  th pancreatic stent that was replaced today at [facility], splenic vein thrombosis, 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  Reports blood-streaked emesis. Patient tachycardic on arrival.  Temperature 99.1°.  He is normotensive and satting 100% on room air.  States that emesis is blood-streaked.  He is not on anticoagulation.    Amount and/or Complexity of Data Reviewed  Labs: ordered. Decision-making details documented in ED Course.  Radiology: ordered. Decision-making details documented in ED Course.    Risk  Prescription drug management.               ED Course as of 12/06/23 1130   Tue Dec 05, 2023   0227 WBC: 10.65  No leukocytosis. [KL]   0228 Hemoglobin(!): 9.8  Anemia, hemoglobin is stable from 1 week prior [KL]   0306 Lipase: 12  Lipase within normal limits. [KL]   0306 Potassium(!): 3.3  Mild hypokalemia.  Repletion ordered. [KL]   0307 BUN(!): <3 [KL]   0307 Creatinine: 0.6  Kidney function normal [KL]   0307 ALT(!): 6 [KL]   0307 AST: 11 [KL]   0307 BILIRUBIN TOTAL: 0.2  LFTs within normal limits [KL]   0550 Patient was re-evaluated.  Discussed lab work but has been back.  Patient states he feels much improved but still having some abdominal pain.  Will reduce medication.  Explained that we are waiting on the results of the CT scan, but hopeful that patient will be able to get discharged home if CT is reassuring. [KL]   0605 CT Abdomen Pelvis With IV Contrast NO Oral Contrast  CT of the abdomen pelvis does not show any acute worsening of chronic problems.  Patient has known splenic vein thrombosis which is unchanged.  Also findings consistent with an enteritis which is in line with this patient's clinical picture.  Collection appears to be decreased in size since stent placement.  These results with the  discussed with the patient.  Instructed to follow-up with his regular doctor.  Return precautions were discussed.  Stable for discharge. [KL]   0655 After discharge patient continually asking to speak to provider to have pain medications prescribed.  Explained that I will not be ordering prescription pain medication for gastroenteritis.  States that he has unable to tolerate any p.o..  He has not vomited since arrival.  Will p.o. challenge now.  Patient is eating yogurt and fluids.  Behavior is concerning for drug-seeking behavior/malingering.  He states that he was not discharged from his surgery with any medications.  He also adds that he would Zofran at home.  Counseled regarding over-the-counter medications for his symptoms. Also discussed strict return precautions. Explained n/v may persist for several days given enteritis and chronic pancreatitis. Counseled regarding signs/symptoms of dehydration and what to return for. He expressed understanding. Pt is arranging ride home.  [KL]      ED Course User Index  [KL] Ashley Culver MD                           Clinical Impression:  Final diagnoses:  [R00.0] Tachycardia  [K52.9] Gastroenteritis (Primary)  [E87.6] Hypokalemia  [K86.1] Chronic pancreatitis, unspecified pancreatitis type  [Z76.5] Drug-seeking behavior          ED Disposition Condition    Discharge Stable          ED Prescriptions    None       Follow-up Information       Follow up With Specialties Details Why Contact Info    [facility] Emergency [facility] Emergency Medicine Go to  As needed, If symptoms worsen [id number]  [facility] [id number] [phone]    Pina Jones NP Family Medicine Schedule an appointment as soon as possible for a visit in 2 days  [address]  [facility] [id number]  [phone]               Ashley Culver MD  Resident  12/06/23 8612

## 2023-12-05 NOTE — ED NOTES
Upon entering room 37, pt is not present and discharge papers are left on bedside table. Per ROLANDO Zavala dr has spoken to the pt

## 2023-12-07 ENCOUNTER — PATIENT OUTREACH (OUTPATIENT)
Dept: EMERGENCY MEDICINE | Facility: HOSPITAL | Age: 23
End: 2023-12-07

## 2023-12-14 ENCOUNTER — HOSPITAL ENCOUNTER (INPATIENT)
Facility: HOSPITAL | Age: 23
LOS: 6 days | Discharge: HOME | DRG: 439 | End: 2023-12-21
Attending: STUDENT IN AN ORGANIZED HEALTH CARE EDUCATION/TRAINING PROGRAM | Admitting: STUDENT IN AN ORGANIZED HEALTH CARE EDUCATION/TRAINING PROGRAM
Payer: MEDICAID

## 2023-12-14 DIAGNOSIS — R07.9 CHEST PAIN: ICD-10-CM

## 2023-12-14 DIAGNOSIS — R10.9 ABDOMINAL PAIN: ICD-10-CM

## 2023-12-14 DIAGNOSIS — R10.13 EPIGASTRIC PAIN: ICD-10-CM

## 2023-12-14 DIAGNOSIS — K86.1 ACUTE ON CHRONIC PANCREATITIS: ICD-10-CM

## 2023-12-14 DIAGNOSIS — K59.03 THERAPEUTIC OPIOID-INDUCED CONSTIPATION (OIC): ICD-10-CM

## 2023-12-14 DIAGNOSIS — T40.2X5A THERAPEUTIC OPIOID-INDUCED CONSTIPATION (OIC): ICD-10-CM

## 2023-12-14 DIAGNOSIS — B20 CURRENTLY ASYMPTOMATIC HIV INFECTION, WITH HISTORY OF HIV-RELATED ILLNESS: Primary | ICD-10-CM

## 2023-12-14 DIAGNOSIS — K85.90 ACUTE ON CHRONIC PANCREATITIS: ICD-10-CM

## 2023-12-14 DIAGNOSIS — G89.4 CHRONIC PAIN SYNDROME: ICD-10-CM

## 2023-12-14 LAB
ALBUMIN SERPL BCP-MCNC: 3.4 G/DL (ref 3.5–5.2)
ALP SERPL-CCNC: 78 U/L (ref 55–135)
ALT SERPL W/O P-5'-P-CCNC: 11 U/L (ref 10–44)
ANION GAP SERPL CALC-SCNC: 11 MMOL/L (ref 8–16)
AST SERPL-CCNC: 9 U/L (ref 10–40)
BASOPHILS # BLD AUTO: 0.04 K/UL (ref 0–0.2)
BASOPHILS NFR BLD: 0.3 % (ref 0–1.9)
BILIRUB SERPL-MCNC: 0.2 MG/DL (ref 0.1–1)
BNP SERPL-MCNC: <10 PG/ML (ref 0–99)
BUN SERPL-MCNC: 10 MG/DL (ref 6–30)
BUN SERPL-MCNC: 11 MG/DL (ref 6–20)
CALCIUM SERPL-MCNC: 9.1 MG/DL (ref 8.7–10.5)
CHLORIDE SERPL-SCNC: 102 MMOL/L (ref 95–110)
CHLORIDE SERPL-SCNC: 104 MMOL/L (ref 95–110)
CO2 SERPL-SCNC: 22 MMOL/L (ref 23–29)
CREAT SERPL-MCNC: 0.5 MG/DL (ref 0.5–1.4)
CREAT SERPL-MCNC: 0.7 MG/DL (ref 0.5–1.4)
DIFFERENTIAL METHOD: ABNORMAL
EOSINOPHIL # BLD AUTO: 0.2 K/UL (ref 0–0.5)
EOSINOPHIL NFR BLD: 1.6 % (ref 0–8)
ERYTHROCYTE [DISTWIDTH] IN BLOOD BY AUTOMATED COUNT: 18.1 % (ref 11.5–14.5)
EST. GFR  (NO RACE VARIABLE): >60 ML/MIN/1.73 M^2
GLUCOSE SERPL-MCNC: 112 MG/DL (ref 70–110)
GLUCOSE SERPL-MCNC: 115 MG/DL (ref 70–110)
HCT VFR BLD AUTO: 33 % (ref 40–54)
HCT VFR BLD CALC: 30 %PCV (ref 36–54)
HGB BLD-MCNC: 9.9 G/DL (ref 14–18)
IMM GRANULOCYTES # BLD AUTO: 0.04 K/UL (ref 0–0.04)
IMM GRANULOCYTES NFR BLD AUTO: 0.3 % (ref 0–0.5)
LIPASE SERPL-CCNC: 136 U/L (ref 4–60)
LYMPHOCYTES # BLD AUTO: 3.1 K/UL (ref 1–4.8)
LYMPHOCYTES NFR BLD: 24.6 % (ref 18–48)
MCH RBC QN AUTO: 24.7 PG (ref 27–31)
MCHC RBC AUTO-ENTMCNC: 30 G/DL (ref 32–36)
MCV RBC AUTO: 82 FL (ref 82–98)
MONOCYTES # BLD AUTO: 0.8 K/UL (ref 0.3–1)
MONOCYTES NFR BLD: 6.3 % (ref 4–15)
NEUTROPHILS # BLD AUTO: 8.4 K/UL (ref 1.8–7.7)
NEUTROPHILS NFR BLD: 66.9 % (ref 38–73)
NRBC BLD-RTO: 0 /100 WBC
PLATELET # BLD AUTO: 473 K/UL (ref 150–450)
PMV BLD AUTO: 10.2 FL (ref 9.2–12.9)
POC IONIZED CALCIUM: 1.09 MMOL/L (ref 1.06–1.42)
POC TCO2 (MEASURED): 22 MMOL/L (ref 23–27)
POTASSIUM BLD-SCNC: 3.7 MMOL/L (ref 3.5–5.1)
POTASSIUM SERPL-SCNC: 4 MMOL/L (ref 3.5–5.1)
PROT SERPL-MCNC: 7.4 G/DL (ref 6–8.4)
RBC # BLD AUTO: 4.01 M/UL (ref 4.6–6.2)
SAMPLE: ABNORMAL
SODIUM BLD-SCNC: 137 MMOL/L (ref 136–145)
SODIUM SERPL-SCNC: 137 MMOL/L (ref 136–145)
TROPONIN I SERPL DL<=0.01 NG/ML-MCNC: <0.006 NG/ML (ref 0–0.03)
WBC # BLD AUTO: 12.53 K/UL (ref 3.9–12.7)

## 2023-12-14 PROCEDURE — 25000003 PHARM REV CODE 250: Performed by: PHYSICIAN ASSISTANT

## 2023-12-14 PROCEDURE — 99285 EMERGENCY DEPT VISIT HI MDM: CPT | Mod: 25

## 2023-12-14 PROCEDURE — 85025 COMPLETE CBC W/AUTO DIFF WBC: CPT | Performed by: PHYSICIAN ASSISTANT

## 2023-12-14 PROCEDURE — 80053 COMPREHEN METABOLIC PANEL: CPT | Performed by: PHYSICIAN ASSISTANT

## 2023-12-14 PROCEDURE — 96375 TX/PRO/DX INJ NEW DRUG ADDON: CPT

## 2023-12-14 PROCEDURE — 93005 ELECTROCARDIOGRAM TRACING: CPT

## 2023-12-14 PROCEDURE — 96361 HYDRATE IV INFUSION ADD-ON: CPT

## 2023-12-14 PROCEDURE — 80047 BASIC METABLC PNL IONIZED CA: CPT

## 2023-12-14 PROCEDURE — 84484 ASSAY OF TROPONIN QUANT: CPT | Performed by: PHYSICIAN ASSISTANT

## 2023-12-14 PROCEDURE — 93010 EKG 12-LEAD: ICD-10-PCS | Mod: ,,, | Performed by: INTERNAL MEDICINE

## 2023-12-14 PROCEDURE — 87040 BLOOD CULTURE FOR BACTERIA: CPT | Performed by: PHYSICIAN ASSISTANT

## 2023-12-14 PROCEDURE — 63600175 PHARM REV CODE 636 W HCPCS: Performed by: PHYSICIAN ASSISTANT

## 2023-12-14 PROCEDURE — 83690 ASSAY OF LIPASE: CPT | Performed by: PHYSICIAN ASSISTANT

## 2023-12-14 PROCEDURE — 93010 ELECTROCARDIOGRAM REPORT: CPT | Mod: ,,, | Performed by: INTERNAL MEDICINE

## 2023-12-14 PROCEDURE — 25500020 PHARM REV CODE 255: Performed by: STUDENT IN AN ORGANIZED HEALTH CARE EDUCATION/TRAINING PROGRAM

## 2023-12-14 PROCEDURE — 83880 ASSAY OF NATRIURETIC PEPTIDE: CPT | Performed by: PHYSICIAN ASSISTANT

## 2023-12-14 RX ORDER — HYDROMORPHONE HYDROCHLORIDE 1 MG/ML
0.5 INJECTION, SOLUTION INTRAMUSCULAR; INTRAVENOUS; SUBCUTANEOUS
Status: COMPLETED | OUTPATIENT
Start: 2023-12-14 | End: 2023-12-14

## 2023-12-14 RX ORDER — ONDANSETRON 2 MG/ML
4 INJECTION INTRAMUSCULAR; INTRAVENOUS
Status: COMPLETED | OUTPATIENT
Start: 2023-12-14 | End: 2023-12-14

## 2023-12-14 RX ORDER — MORPHINE SULFATE 4 MG/ML
4 INJECTION, SOLUTION INTRAMUSCULAR; INTRAVENOUS
Status: COMPLETED | OUTPATIENT
Start: 2023-12-14 | End: 2023-12-14

## 2023-12-14 RX ADMIN — HYDROMORPHONE HYDROCHLORIDE 0.5 MG: 1 INJECTION, SOLUTION INTRAMUSCULAR; INTRAVENOUS; SUBCUTANEOUS at 10:12

## 2023-12-14 RX ADMIN — SODIUM CHLORIDE 1000 ML: 9 INJECTION, SOLUTION INTRAVENOUS at 09:12

## 2023-12-14 RX ADMIN — MORPHINE SULFATE 4 MG: 4 INJECTION INTRAVENOUS at 09:12

## 2023-12-14 RX ADMIN — ONDANSETRON 4 MG: 2 INJECTION INTRAMUSCULAR; INTRAVENOUS at 09:12

## 2023-12-14 RX ADMIN — IOHEXOL 75 ML: 350 INJECTION, SOLUTION INTRAVENOUS at 10:12

## 2023-12-14 NOTE — Clinical Note
Diagnosis: Abdominal pain [601656]   Future Attending Provider: BASIL JOSHI [[id number]]   Admitting Provider:: HOWIE KILPATRICK [[id number]]

## 2023-12-15 PROBLEM — K92.1 MELENA: Status: ACTIVE | Noted: 2023-12-15

## 2023-12-15 PROBLEM — K86.1 ACUTE ON CHRONIC PANCREATITIS: Status: ACTIVE | Noted: 2023-12-15

## 2023-12-15 PROBLEM — K85.91 NECROTIZING PANCREATITIS: Chronic | Status: ACTIVE | Noted: 2023-10-31

## 2023-12-15 PROBLEM — K85.90 ACUTE ON CHRONIC PANCREATITIS: Status: ACTIVE | Noted: 2023-12-15

## 2023-12-15 LAB
ABO + RH BLD: NORMAL
ALBUMIN SERPL BCP-MCNC: 2.7 G/DL (ref 3.5–5.2)
ALP SERPL-CCNC: 60 U/L (ref 55–135)
ALT SERPL W/O P-5'-P-CCNC: 6 U/L (ref 10–44)
ANION GAP SERPL CALC-SCNC: 10 MMOL/L (ref 8–16)
ANISOCYTOSIS BLD QL SMEAR: SLIGHT
AST SERPL-CCNC: 13 U/L (ref 10–40)
BASOPHILS # BLD AUTO: 0.01 K/UL (ref 0–0.2)
BASOPHILS # BLD AUTO: 0.02 K/UL (ref 0–0.2)
BASOPHILS NFR BLD: 0.1 % (ref 0–1.9)
BASOPHILS NFR BLD: 0.2 % (ref 0–1.9)
BILIRUB SERPL-MCNC: 0.2 MG/DL (ref 0.1–1)
BILIRUB UR QL STRIP: NEGATIVE
BLD GP AB SCN CELLS X3 SERPL QL: NORMAL
BUN SERPL-MCNC: 7 MG/DL (ref 6–20)
CALCIUM SERPL-MCNC: 7.5 MG/DL (ref 8.7–10.5)
CHLORIDE SERPL-SCNC: 111 MMOL/L (ref 95–110)
CLARITY UR REFRACT.AUTO: CLEAR
CO2 SERPL-SCNC: 17 MMOL/L (ref 23–29)
COLOR UR AUTO: YELLOW
CREAT SERPL-MCNC: 0.5 MG/DL (ref 0.5–1.4)
DIFFERENTIAL METHOD: ABNORMAL
DIFFERENTIAL METHOD: ABNORMAL
EOSINOPHIL # BLD AUTO: 0.1 K/UL (ref 0–0.5)
EOSINOPHIL # BLD AUTO: 0.1 K/UL (ref 0–0.5)
EOSINOPHIL NFR BLD: 0.7 % (ref 0–8)
EOSINOPHIL NFR BLD: 0.9 % (ref 0–8)
ERYTHROCYTE [DISTWIDTH] IN BLOOD BY AUTOMATED COUNT: 18 % (ref 11.5–14.5)
ERYTHROCYTE [DISTWIDTH] IN BLOOD BY AUTOMATED COUNT: 18.4 % (ref 11.5–14.5)
EST. GFR  (NO RACE VARIABLE): >60 ML/MIN/1.73 M^2
GLUCOSE SERPL-MCNC: 103 MG/DL (ref 70–110)
GLUCOSE UR QL STRIP: NEGATIVE
HCT VFR BLD AUTO: 19.1 % (ref 40–54)
HCT VFR BLD AUTO: 31.9 % (ref 40–54)
HGB BLD-MCNC: 5.6 G/DL (ref 14–18)
HGB BLD-MCNC: 9.6 G/DL (ref 14–18)
HGB UR QL STRIP: NEGATIVE
HYPOCHROMIA BLD QL SMEAR: ABNORMAL
IMM GRANULOCYTES # BLD AUTO: 0.03 K/UL (ref 0–0.04)
IMM GRANULOCYTES # BLD AUTO: 0.07 K/UL (ref 0–0.04)
IMM GRANULOCYTES NFR BLD AUTO: 0.3 % (ref 0–0.5)
IMM GRANULOCYTES NFR BLD AUTO: 0.5 % (ref 0–0.5)
KETONES UR QL STRIP: NEGATIVE
LEUKOCYTE ESTERASE UR QL STRIP: NEGATIVE
LYMPHOCYTES # BLD AUTO: 2.1 K/UL (ref 1–4.8)
LYMPHOCYTES # BLD AUTO: 2.2 K/UL (ref 1–4.8)
LYMPHOCYTES NFR BLD: 16.2 % (ref 18–48)
LYMPHOCYTES NFR BLD: 19 % (ref 18–48)
MCH RBC QN AUTO: 25.2 PG (ref 27–31)
MCH RBC QN AUTO: 25.6 PG (ref 27–31)
MCHC RBC AUTO-ENTMCNC: 29.3 G/DL (ref 32–36)
MCHC RBC AUTO-ENTMCNC: 30.1 G/DL (ref 32–36)
MCV RBC AUTO: 85 FL (ref 82–98)
MCV RBC AUTO: 86 FL (ref 82–98)
MONOCYTES # BLD AUTO: 1 K/UL (ref 0.3–1)
MONOCYTES # BLD AUTO: 1.1 K/UL (ref 0.3–1)
MONOCYTES NFR BLD: 8.3 % (ref 4–15)
MONOCYTES NFR BLD: 9.1 % (ref 4–15)
NEUTROPHILS # BLD AUTO: 8 K/UL (ref 1.8–7.7)
NEUTROPHILS # BLD AUTO: 9.8 K/UL (ref 1.8–7.7)
NEUTROPHILS NFR BLD: 70.8 % (ref 38–73)
NEUTROPHILS NFR BLD: 73.9 % (ref 38–73)
NITRITE UR QL STRIP: NEGATIVE
NRBC BLD-RTO: 0 /100 WBC
NRBC BLD-RTO: 0 /100 WBC
PH UR STRIP: 6 [PH] (ref 5–8)
PLATELET # BLD AUTO: 362 K/UL (ref 150–450)
PLATELET # BLD AUTO: 417 K/UL (ref 150–450)
PLATELET BLD QL SMEAR: ABNORMAL
PMV BLD AUTO: 10 FL (ref 9.2–12.9)
PMV BLD AUTO: 10.1 FL (ref 9.2–12.9)
POTASSIUM SERPL-SCNC: 3.8 MMOL/L (ref 3.5–5.1)
PROT SERPL-MCNC: 6 G/DL (ref 6–8.4)
PROT UR QL STRIP: NEGATIVE
RBC # BLD AUTO: 2.22 M/UL (ref 4.6–6.2)
RBC # BLD AUTO: 3.75 M/UL (ref 4.6–6.2)
SODIUM SERPL-SCNC: 138 MMOL/L (ref 136–145)
SP GR UR STRIP: >1.03 (ref 1–1.03)
SPECIMEN OUTDATE: NORMAL
URN SPEC COLLECT METH UR: ABNORMAL
WBC # BLD AUTO: 11.34 K/UL (ref 3.9–12.7)
WBC # BLD AUTO: 13.2 K/UL (ref 3.9–12.7)

## 2023-12-15 PROCEDURE — 63600175 PHARM REV CODE 636 W HCPCS

## 2023-12-15 PROCEDURE — 96376 TX/PRO/DX INJ SAME DRUG ADON: CPT

## 2023-12-15 PROCEDURE — 96375 TX/PRO/DX INJ NEW DRUG ADDON: CPT

## 2023-12-15 PROCEDURE — 12000002 HC ACUTE/MED SURGE SEMI-PRIVATE ROOM

## 2023-12-15 PROCEDURE — 99223 PR INITIAL HOSPITAL CARE,LEVL III: ICD-10-PCS | Mod: ,,, | Performed by: INTERNAL MEDICINE

## 2023-12-15 PROCEDURE — 25000003 PHARM REV CODE 250

## 2023-12-15 PROCEDURE — C9113 INJ PANTOPRAZOLE SODIUM, VIA: HCPCS

## 2023-12-15 PROCEDURE — 80053 COMPREHEN METABOLIC PANEL: CPT

## 2023-12-15 PROCEDURE — 99223 1ST HOSP IP/OBS HIGH 75: CPT | Mod: ,,, | Performed by: INTERNAL MEDICINE

## 2023-12-15 PROCEDURE — 96365 THER/PROPH/DIAG IV INF INIT: CPT

## 2023-12-15 PROCEDURE — 80321 ALCOHOLS BIOMARKERS 1OR 2: CPT

## 2023-12-15 PROCEDURE — 86850 RBC ANTIBODY SCREEN: CPT

## 2023-12-15 PROCEDURE — 63600175 PHARM REV CODE 636 W HCPCS: Performed by: STUDENT IN AN ORGANIZED HEALTH CARE EDUCATION/TRAINING PROGRAM

## 2023-12-15 PROCEDURE — 81003 URINALYSIS AUTO W/O SCOPE: CPT | Performed by: PHYSICIAN ASSISTANT

## 2023-12-15 PROCEDURE — 85025 COMPLETE CBC W/AUTO DIFF WBC: CPT | Mod: 91

## 2023-12-15 RX ORDER — SODIUM CHLORIDE 9 MG/ML
INJECTION, SOLUTION INTRAVENOUS CONTINUOUS
Status: DISCONTINUED | OUTPATIENT
Start: 2023-12-15 | End: 2023-12-15

## 2023-12-15 RX ORDER — PANTOPRAZOLE SODIUM 40 MG/10ML
40 INJECTION, POWDER, LYOPHILIZED, FOR SOLUTION INTRAVENOUS 2 TIMES DAILY
Status: DISCONTINUED | OUTPATIENT
Start: 2023-12-15 | End: 2023-12-16

## 2023-12-15 RX ORDER — NALOXONE HCL 0.4 MG/ML
0.02 VIAL (ML) INJECTION
Status: DISCONTINUED | OUTPATIENT
Start: 2023-12-15 | End: 2023-12-21 | Stop reason: HOSPADM

## 2023-12-15 RX ORDER — POLYETHYLENE GLYCOL 3350 17 G/17G
17 POWDER, FOR SOLUTION ORAL DAILY PRN
Status: DISCONTINUED | OUTPATIENT
Start: 2023-12-15 | End: 2023-12-21 | Stop reason: HOSPADM

## 2023-12-15 RX ORDER — GLUCAGON 1 MG
1 KIT INJECTION
Status: DISCONTINUED | OUTPATIENT
Start: 2023-12-15 | End: 2023-12-21 | Stop reason: HOSPADM

## 2023-12-15 RX ORDER — SENNOSIDES 8.6 MG/1
8.6 TABLET ORAL DAILY
Status: DISCONTINUED | OUTPATIENT
Start: 2023-12-16 | End: 2023-12-19

## 2023-12-15 RX ORDER — HYDROMORPHONE HYDROCHLORIDE 1 MG/ML
0.5 INJECTION, SOLUTION INTRAMUSCULAR; INTRAVENOUS; SUBCUTANEOUS ONCE AS NEEDED
Status: COMPLETED | OUTPATIENT
Start: 2023-12-15 | End: 2023-12-15

## 2023-12-15 RX ORDER — IBUPROFEN 200 MG
16 TABLET ORAL
Status: DISCONTINUED | OUTPATIENT
Start: 2023-12-15 | End: 2023-12-21 | Stop reason: HOSPADM

## 2023-12-15 RX ORDER — ACETAMINOPHEN 325 MG/1
650 TABLET ORAL EVERY 6 HOURS PRN
Status: DISCONTINUED | OUTPATIENT
Start: 2023-12-15 | End: 2023-12-17

## 2023-12-15 RX ORDER — HYDROMORPHONE HYDROCHLORIDE 1 MG/ML
1 INJECTION, SOLUTION INTRAMUSCULAR; INTRAVENOUS; SUBCUTANEOUS EVERY 6 HOURS PRN
Status: DISCONTINUED | OUTPATIENT
Start: 2023-12-15 | End: 2023-12-15

## 2023-12-15 RX ORDER — AMOXICILLIN 250 MG
2 CAPSULE ORAL 2 TIMES DAILY PRN
Status: DISCONTINUED | OUTPATIENT
Start: 2023-12-15 | End: 2023-12-21 | Stop reason: HOSPADM

## 2023-12-15 RX ORDER — HYDROMORPHONE HYDROCHLORIDE 1 MG/ML
1 INJECTION, SOLUTION INTRAMUSCULAR; INTRAVENOUS; SUBCUTANEOUS
Status: COMPLETED | OUTPATIENT
Start: 2023-12-15 | End: 2023-12-15

## 2023-12-15 RX ORDER — HYDROMORPHONE HYDROCHLORIDE 1 MG/ML
1 INJECTION, SOLUTION INTRAMUSCULAR; INTRAVENOUS; SUBCUTANEOUS EVERY 4 HOURS PRN
Status: DISCONTINUED | OUTPATIENT
Start: 2023-12-15 | End: 2023-12-16

## 2023-12-15 RX ORDER — IBUPROFEN 200 MG
24 TABLET ORAL
Status: DISCONTINUED | OUTPATIENT
Start: 2023-12-15 | End: 2023-12-21 | Stop reason: HOSPADM

## 2023-12-15 RX ORDER — SODIUM CHLORIDE 0.9 % (FLUSH) 0.9 %
10 SYRINGE (ML) INJECTION EVERY 12 HOURS PRN
Status: DISCONTINUED | OUTPATIENT
Start: 2023-12-15 | End: 2023-12-21 | Stop reason: HOSPADM

## 2023-12-15 RX ORDER — SODIUM CHLORIDE 9 MG/ML
INJECTION, SOLUTION INTRAVENOUS CONTINUOUS
Status: ACTIVE | OUTPATIENT
Start: 2023-12-15 | End: 2023-12-15

## 2023-12-15 RX ORDER — OXYCODONE AND ACETAMINOPHEN 5; 325 MG/1; MG/1
1 TABLET ORAL EVERY 4 HOURS PRN
Status: DISCONTINUED | OUTPATIENT
Start: 2023-12-15 | End: 2023-12-16

## 2023-12-15 RX ORDER — POLYETHYLENE GLYCOL 3350 17 G/17G
17 POWDER, FOR SOLUTION ORAL DAILY
Status: DISCONTINUED | OUTPATIENT
Start: 2023-12-16 | End: 2023-12-21 | Stop reason: HOSPADM

## 2023-12-15 RX ORDER — FOLIC ACID 1 MG/1
1 TABLET ORAL DAILY
Status: DISCONTINUED | OUTPATIENT
Start: 2023-12-15 | End: 2023-12-21 | Stop reason: HOSPADM

## 2023-12-15 RX ADMIN — PROMETHAZINE HYDROCHLORIDE 12.5 MG: 25 INJECTION INTRAMUSCULAR; INTRAVENOUS at 03:12

## 2023-12-15 RX ADMIN — HYDROMORPHONE HYDROCHLORIDE 1 MG: 1 INJECTION, SOLUTION INTRAMUSCULAR; INTRAVENOUS; SUBCUTANEOUS at 03:12

## 2023-12-15 RX ADMIN — SODIUM CHLORIDE: 9 INJECTION, SOLUTION INTRAVENOUS at 03:12

## 2023-12-15 RX ADMIN — PANTOPRAZOLE SODIUM 40 MG: 40 INJECTION, POWDER, FOR SOLUTION INTRAVENOUS at 03:12

## 2023-12-15 RX ADMIN — HYDROMORPHONE HYDROCHLORIDE 1 MG: 1 INJECTION, SOLUTION INTRAMUSCULAR; INTRAVENOUS; SUBCUTANEOUS at 01:12

## 2023-12-15 RX ADMIN — HYDROMORPHONE HYDROCHLORIDE 1 MG: 1 INJECTION, SOLUTION INTRAMUSCULAR; INTRAVENOUS; SUBCUTANEOUS at 09:12

## 2023-12-15 RX ADMIN — FOLIC ACID 1 MG: 1 TABLET ORAL at 08:12

## 2023-12-15 RX ADMIN — OXYCODONE HYDROCHLORIDE AND ACETAMINOPHEN 1 TABLET: 5; 325 TABLET ORAL at 02:12

## 2023-12-15 RX ADMIN — PANTOPRAZOLE SODIUM 40 MG: 40 INJECTION, POWDER, FOR SOLUTION INTRAVENOUS at 09:12

## 2023-12-15 RX ADMIN — OXYCODONE HYDROCHLORIDE AND ACETAMINOPHEN 1 TABLET: 5; 325 TABLET ORAL at 09:12

## 2023-12-15 RX ADMIN — HYDROMORPHONE HYDROCHLORIDE 1 MG: 1 INJECTION, SOLUTION INTRAMUSCULAR; INTRAVENOUS; SUBCUTANEOUS at 11:12

## 2023-12-15 RX ADMIN — HYDROMORPHONE HYDROCHLORIDE 1 MG: 1 INJECTION, SOLUTION INTRAMUSCULAR; INTRAVENOUS; SUBCUTANEOUS at 05:12

## 2023-12-15 RX ADMIN — BICTEGRAVIR SODIUM, EMTRICITABINE, AND TENOFOVIR ALAFENAMIDE FUMARATE 1 TABLET: 50; 200; 25 TABLET ORAL at 08:12

## 2023-12-15 RX ADMIN — HYDROMORPHONE HYDROCHLORIDE 0.5 MG: 1 INJECTION, SOLUTION INTRAMUSCULAR; INTRAVENOUS; SUBCUTANEOUS at 05:12

## 2023-12-15 NOTE — HPI
Mr. Cardona is a 24yo M with a PMHx of necrotizing pancreatis with multiple abdominal procedures, HIV, polycythemia vera c/b splenic vein thrombosis (no anticoagulation), asthma, anemia, Corinne Jeronimo tear (9/2023) presents to the ED with 10/10 epigastric pain, hematemesis that started this evening. Patient states he was discharged from [facility] yesterday after removal of cystogastrostomy stent, had min at home and developed abdominal pain. He took an oxy 5 and had 1x episode of hematemesis. He came to [facility] as it is closer to his house. He reports some associated CP and SOB especially with deep breaths or speaking. Has issues with constipation related to his chronic opioid use, his last BM was Monday 12/11. He reports some melanic stools the last 1-2 weeks that he was told would be expected with his recent cystogastrostomy placements and exchanges. He feels they are improving. Upon chart review, he has had intermittent melena the last 6 months.     Of note, the patient has had multiple ED admissions and hospitalizations at [facility] and [facility] for symptoms related to his necrotizing pancreatitis including severe abdominal pain, n/v. He has undergone IR guided aspiration of peripancreatic collections, had cystogastrostomy tubes placed as well as two necrosectomies. He has associated chronic pain that proves difficult to manage.     In the ED, pt afebrile, HDS on RA. CBC with WCC wnl, stable anemia Hb 9.9. CMP unremarkable. Lipase elevated to 136, last noted to be 26 on 12/13 at [facility]. BNP and troponin wnl. CTAP showing slight increase in peripancreatic collection from 2.9cm x 1.4cm at [facility] on 12/9 to 4.3cm x 1.5cm. Patient received IL NS bolus, IV morphine and dilaudid for pain control and was made NPO. He is being admitted to observation for acute flare of his chronic pancreatitis and pain control.

## 2023-12-15 NOTE — ASSESSMENT & PLAN NOTE
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 Unlikely cause of current presentation, but could consider repeat CTA, although careful consideration should be take in anticoagulation if findings suggest extension of thrombosis.

## 2023-12-15 NOTE — ED NOTES
Assumed care of this pt at this time   Patient identifiers verified and correct for Tasia Cardona   LOC: The patient is awake, alert and aware of environment with an appropriate affect, the patient is oriented x 3 and speaking appropriately.   APPEARANCE: Patient appears uncomfortable but in no acute distress, patient is clean and well groomed.  SKIN: The skin is warm and dry, color consistent with ethnicity, patient has normal skin turgor and moist mucus membranes, skin intact, no breakdown or bruising noted.   MUSCULOSKELETAL: Patient moving all extremities spontaneously, no swelling noted.  RESPIRATORY: Airway is open and patent, respirations are spontaneous, patient has a normal effort and rate, no accessory muscle use noted, O2 sats noted at 98% on room air.  CARDIAC: Patient has a normal rate, no edema noted, capillary refill < 3 seconds.   GASTRO: Soft and tender to palpation, no distention noted. Pt c/o 9/10 abd pain   : Pt denies any pain or frequency with urination.  NEURO: Pt opens eyes spontaneously, behavior appropriate to situation, follows commands, facial expression symmetrical, bilateral hand grasp equal and even, purposeful motor response noted, normal sensation in all extremities when touched with a finger.

## 2023-12-15 NOTE — ED NOTES
Patient still c/o 10/10 pain after receiving 0.5mg dilaudid. Pt states he normally receives 2mg. Provider notified.

## 2023-12-15 NOTE — ASSESSMENT & PLAN NOTE
Patient with necrotizing pancreatitis c/b chronic peripancreatic fluid collections, initially diagnosed in September 2023 likely 2/2 chronic pancreatitis from alcohol abuse. Per chart review, last drink in September. Patient has had multiple hospital admissions and ED presentations at [facility] and [facility] for symptoms related to his chronic necrotizing pancreatitis including abdominal pain, n/v and melena. He has required multiple abdominal procedures including EGD, IR guided aspiration of fluid collection, EUS with necrosectomy, cystogastrostomy tubes. He has chronic pain which is difficult to manage. lipase is newly elevated to 126 and has an interval mild increase in size of his fluid collection. He is admitted for further workup of acute flare of chronic pancreatis, AES consult and pain management. AES w/ no intervention for pancreatic fluid collection, advance diet as tolerated. Patient w/ no further vomiting, pain relieved w/ pain meds.    - CLD, advancing as tolerated  - IVF  - Pain control: patient takes oxy 5 and dilaudid 2 at home   - IV dilaudid 1mg q6h   - percocet 10 q4hr  - phenergan PRN for nausea

## 2023-12-15 NOTE — SUBJECTIVE & OBJECTIVE
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       Past Surgical History:   Procedure Laterality Date    ENDOSCOPIC ULTRASOUND OF UPPER GASTROINTESTINAL TRACT N/A 10/23/2023    Procedure: ULTRASOUND, UPPER GI TRACT, ENDOSCOPIC;  Surgeon: Emil Villatoro MD;  [facility]: [facility] ([facility]  Service: Endoscopy;  Laterality: N/A;    ERCP N/A 10/23/2023    Procedure: ERCP (ENDOSCOPIC RETROGRADE CHOLANGIOPANCREATOGRAPHY);  Surgeon: Emil Villatoro MD;  [facility] ([facility]  Service: Endoscopy;  Laterality: N/A;    ESOPHAGOGASTRODUODENOSCOPY N/A 9/18/2023    Procedure: EGD (ESOPHAGOGASTRODUODENOSCOPY);  Surgeon: Kg Cleaning MD;  Location: [facility] ([facility]  Service: Endoscopy;  Laterality: N/A;       Review of patient's allergies indicates:   Allergen Reactions    [place] Swelling    Pcn [penicillins] Hives     Tolerates cephalosporins    Pecan nut Swelling    Sulfa (sulfonamide antibiotics) Hives       No current facility-administered medications on file prior to encounter.     Current Outpatient Medications on File Prior to Encounter   Medication Sig    enrksxdbm-ngoqlszr-wuqlvet ala (BIKTARVY) -25 mg (25 kg or greater) Take 1 tablet by mouth once daily.    folic acid (FOLVITE) 1 MG tablet Take 1 tablet (1 mg total) by mouth once daily.    metoclopramide HCl (REGLAN) 5 mg/5 mL Soln Take 10 mLs (10 mg total) by mouth every 6 (six) hours as needed.    oxyCODONE (ROXICODONE) 5 MG immediate release tablet Take 1 tablet (5 mg total) by mouth every 4 (four) hours as needed for  Pain.    pantoprazole (PROTONIX) 40 MG tablet Take 1 tablet (40 mg total) by mouth 2 (two) times daily.    polyethylene glycol (GLYCOLAX) 17 gram PwPk Take 17 g by mouth daily as needed for Constipation.    promethazine (PHENERGAN) 25 MG tablet Take 25 mg by mouth every 4 (four) hours as needed for Nausea.    senna-docusate 8.6-50 mg (PERICOLACE) 8.6-50 mg per tablet Take 2 tablets by mouth 2 (two) times daily as needed for Constipation.     Family History       Problem Relation (Age of Onset)    Breast cancer Maternal Grandmother    Cancer Mother, Brother    No Known Problems Father    Ovarian cancer Mother    Pancreatitis Mother          Tobacco Use    Smoking status: Former     Types: Cigarettes    Smokeless tobacco: Never   Substance and Sexual Activity    Alcohol use: Not Currently    Drug use: Never    Sexual activity: Not on file     Review of Systems   Constitutional:  Positive for chills. Negative for fever.   Eyes:  Negative for visual disturbance.   Respiratory:  Positive for shortness of breath.    Cardiovascular:  Positive for chest pain.   Gastrointestinal:  Positive for abdominal pain, blood in stool, constipation, nausea and vomiting (with small amount of blood just before coming to ED). Negative for diarrhea.   Neurological:  Negative for headaches.     Objective:     Vital Signs (Most Recent):  Temp: 98.3 °F (36.8 °C) (12/15/23 0321)  Pulse: 104 (12/15/23 0202)  Resp: 16 (12/15/23 0343)  BP: 114/74 (12/15/23 0202)  SpO2: 95 % (12/15/23 0202) Vital Signs (24h Range):  Temp:  [97.8 °F (36.6 °C)-98.3 °F (36.8 °C)] 98.3 °F (36.8 °C)  Pulse:  [] 104  Resp:  [16-20] 16  SpO2:  [95 %-98 %] 95 %  BP: (114-142)/(64-81) 114/74        There is no height or weight on file to calculate BMI.     Physical Exam  Constitutional:       General: He is not in acute distress.     Appearance: Normal appearance. He is not ill-appearing.   HENT:      Head: Normocephalic and atraumatic.   Eyes:      Extraocular  "Movements: Extraocular movements intact.      Conjunctiva/sclera: Conjunctivae normal.   Cardiovascular:      Rate and Rhythm: Tachycardia present.      Heart sounds: Normal heart sounds.   Pulmonary:      Effort: Pulmonary effort is normal. No respiratory distress.      Breath sounds: Normal breath sounds. No wheezing.   Abdominal:      General: Abdomen is flat. There is no distension.      Palpations: Abdomen is soft.      Tenderness: There is abdominal tenderness (RUQ, RLQ, but especially epigastric and periumbilical). There is guarding.   Musculoskeletal:      Right lower leg: No edema.      Left lower leg: No edema.   Skin:     General: Skin is warm and dry.   Neurological:      Mental Status: He is alert and oriented to person, place, and time.   Psychiatric:         Mood and Affect: Mood normal.         Behavior: Behavior normal.         Thought Content: Thought content normal.         Judgment: Judgment normal.                Significant Labs: All pertinent labs within the past 24 hours have been reviewed.  CBC:   Recent Labs   Lab 12/14/23  1331 12/14/23  2121 12/14/23  2143   WBC  --  12.53  --    HGB 9.1* 9.9*  --    HCT 29.0* 33.0* 30*   PLT  --  473*  --      CMP:   Recent Labs   Lab 12/14/23 2121      K 4.0      CO2 22*   *   BUN 11   CREATININE 0.7   CALCIUM 9.1   PROT 7.4   ALBUMIN 3.4*   BILITOT 0.2   ALKPHOS 78   AST 9*   ALT 11   ANIONGAP 11     Coagulation:   Recent Labs   Lab 12/13/23  0830   INR 1.1   APTT 25.7     Lactic Acid: No results for input(s): "LACTATE" in the last 48 hours.  Lipase:   Recent Labs   Lab 12/14/23  2121   LIPASE 136*     Urine Studies:   Recent Labs   Lab 12/15/23  0311   COLORU Yellow   APPEARANCEUA Clear   PHUR 6.0   SPECGRAV >1.030*   PROTEINUA Negative   GLUCUA Negative   KETONESU Negative   BILIRUBINUA Negative   OCCULTUA Negative   NITRITE Negative   LEUKOCYTESUR Negative       Significant Imaging: I have reviewed all pertinent imaging " results/findings within the past 24 hours.    CT: I have reviewed all pertinent results/findings within the past 24 hours and findings are:     1. Small circumscribed fluid collection adjacent to the pancreatic body and tail measuring 1.5 x 4.3 cm, nonspecific though could be associated with acute pancreatitis.  Recommend clinical correlation and follow-up.  2. Mild free fluid adjacent to the stomach and liver without loculation or wall thickening.  A cyst drainage catheter into the stomach was noted in this region on the recent prior study, removed in the interval.  3. Hepatosplenomegaly.  4. Mild urinary bladder wall thickening.  Cystitis is a consideration.  5. Possible constipation.    CXR: I have reviewed all pertinent results/findings within the past 24 hours and findings are:     No consolidation, pleural effusion or pneumothorax.  Cardiomediastinal silhouette is unremarkable.

## 2023-12-15 NOTE — H&P
[facility] - Emergency Dept  [facility] Medicine  History & Physical    Patient Name: Tasia Cardona  MRN: [id number]  Patient Class: OP- Observation  Admission Date: 12/14/2023  Attending Physician: Genna Samuels MD   Primary Care Provider: Pina Jones NP         Patient information was obtained from patient, past medical records, and ER records.     Subjective:     Principal Problem:Necrotizing pancreatitis    Chief Complaint:   Chief Complaint   Patient presents with    Abdominal Pain     Hx chronic pancreatitis and having intense abdominal pain since stent removal procedure yesterday, +N/V        HPI: Mr. Cardona is a 22yo M with a PMHx of necrotizing pancreatis with multiple abdominal procedures, HIV, polycythemia vera c/b splenic vein thrombosis (no anticoagulation), asthma, anemia, Corinne Jeronimo tear (9/2023) presents to the ED with 10/10 epigastric pain, hematemesis that started this evening. Patient states he was discharged from [facility] yesterday after removal of cystogastrostomy stent, had min at home and developed abdominal pain. He took an oxy 5 and had 1x episode of hematemesis. He came to [facility] as it is closer to his house. He reports some associated CP and SOB especially with deep breaths or speaking. Has issues with constipation related to his chronic opioid use, his last BM was Monday 12/11. He reports some melanic stools the last 1-2 weeks that he was told would be expected with his recent cystogastrostomy placements and exchanges. He feels they are improving. Upon chart review, he has had intermittent melena the last 6 months.     Of note, the patient has had multiple ED admissions and hospitalizations at [facility] and [facility] for symptoms related to his necrotizing pancreatitis including severe abdominal pain, n/v. He has undergone IR guided aspiration of peripancreatic collections, had cystogastrostomy tubes placed as well as two necrosectomies. He has associated chronic pain that proves  difficult to manage.     In the ED, pt afebrile, HDS on RA. CBC with WCC wnl, stable anemia Hb 9.9. CMP unremarkable. Lipase elevated to 136, last noted to be 26 on 12/13 at [facility]. BNP and troponin wnl. CTAP showing slight increase in peripancreatic collection from 2.9cm x 1.4cm at [facility] on 12/9 to 4.3cm x 1.5cm. Patient received IL NS bolus, IV morphine and dilaudid for pain control and was made NPO. He is being admitted to observation for acute flare of his chronic pancreatitis and pain control.     Past Medical History:   Diagnosis Date    Acute necrotizing pancreatitis 09/20/2023    Alcohol use disorder 10/05/2023    Asthma     Hepatitis C antibody positive in blood 09/18/2023 9/2023 PCR negative    HIV infection 10/2021    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       Past Surgical History:   Procedure Laterality Date    ENDOSCOPIC ULTRASOUND OF UPPER GASTROINTESTINAL TRACT N/A 10/23/2023    Procedure: ULTRASOUND, UPPER GI TRACT, ENDOSCOPIC;  Surgeon: Emil Villatoro MD;  Location: [address]);  Service: Endoscopy;  Laterality: N/A;    ERCP N/A 10/23/2023    Procedure: ERCP (ENDOSCOPIC RETROGRADE CHOLANGIOPANCREATOGRAPHY);  Surgeon: Emil Villatoro MD;  Location: [address]);  Service: Endoscopy;  Laterality: N/A;    ESOPHAGOGASTRODUODENOSCOPY N/A 9/18/2023    Procedure: EGD (ESOPHAGOGASTRODUODENOSCOPY);  Surgeon: Kg Cleaning MD;  Location: [facility] ([address] [place] [address]);  Service: Endoscopy;  Laterality: N/A;       Review of patient's allergies indicates:   Allergen Reactions    [place] Swelling    Pcn [penicillins] Hives     Tolerates cephalosporins    Pecan nut Swelling    Sulfa (sulfonamide antibiotics) Hives       No current facility-administered medications on file prior to encounter.     Current  Outpatient Medications on File Prior to Encounter   Medication Sig    ycbdcbrbf-ixksflzf-lrpprzr ala (BIKTARVY) -25 mg (25 kg or greater) Take 1 tablet by mouth once daily.    folic acid (FOLVITE) 1 MG tablet Take 1 tablet (1 mg total) by mouth once daily.    metoclopramide HCl (REGLAN) 5 mg/5 mL Soln Take 10 mLs (10 mg total) by mouth every 6 (six) hours as needed.    oxyCODONE (ROXICODONE) 5 MG immediate release tablet Take 1 tablet (5 mg total) by mouth every 4 (four) hours as needed for Pain.    pantoprazole (PROTONIX) 40 MG tablet Take 1 tablet (40 mg total) by mouth 2 (two) times daily.    polyethylene glycol (GLYCOLAX) 17 gram PwPk Take 17 g by mouth daily as needed for Constipation.    promethazine (PHENERGAN) 25 MG tablet Take 25 mg by mouth every 4 (four) hours as needed for Nausea.    senna-docusate 8.6-50 mg (PERICOLACE) 8.6-50 mg per tablet Take 2 tablets by mouth 2 (two) times daily as needed for Constipation.     Family History       Problem Relation (Age of Onset)    Breast cancer Maternal Grandmother    Cancer Mother, Brother    No Known Problems Father    Ovarian cancer Mother    Pancreatitis Mother          Tobacco Use    Smoking status: Former     Types: Cigarettes    Smokeless tobacco: Never   Substance and Sexual Activity    Alcohol use: Not Currently    Drug use: Never    Sexual activity: Not on file     Review of Systems   Constitutional:  Positive for chills. Negative for fever.   Eyes:  Negative for visual disturbance.   Respiratory:  Positive for shortness of breath.    Cardiovascular:  Positive for chest pain.   Gastrointestinal:  Positive for abdominal pain, blood in stool, constipation, nausea and vomiting (with small amount of blood just before coming to ED). Negative for diarrhea.   Neurological:  Negative for headaches.     Objective:     Vital Signs (Most Recent):  Temp: 98.3 °F (36.8 °C) (12/15/23 0321)  Pulse: 104 (12/15/23 0202)  Resp: 16 (12/15/23 0343)  BP: 114/74  (12/15/23 0202)  SpO2: 95 % (12/15/23 0202) Vital Signs (24h Range):  Temp:  [97.8 °F (36.6 °C)-98.3 °F (36.8 °C)] 98.3 °F (36.8 °C)  Pulse:  [] 104  Resp:  [16-20] 16  SpO2:  [95 %-98 %] 95 %  BP: (114-142)/(64-81) 114/74        There is no height or weight on file to calculate BMI.     Physical Exam  Constitutional:       General: He is not in acute distress.     Appearance: Normal appearance. He is not ill-appearing.   HENT:      Head: Normocephalic and atraumatic.   Eyes:      Extraocular Movements: Extraocular movements intact.      Conjunctiva/sclera: Conjunctivae normal.   Cardiovascular:      Rate and Rhythm: Tachycardia present.      Heart sounds: Normal heart sounds.   Pulmonary:      Effort: Pulmonary effort is normal. No respiratory distress.      Breath sounds: Normal breath sounds. No wheezing.   Abdominal:      General: Abdomen is flat. There is no distension.      Palpations: Abdomen is soft.      Tenderness: There is abdominal tenderness (RUQ, RLQ, but especially epigastric and periumbilical). There is guarding.   Musculoskeletal:      Right lower leg: No edema.      Left lower leg: No edema.   Skin:     General: Skin is warm and dry.   Neurological:      Mental Status: He is alert and oriented to person, place, and time.   Psychiatric:         Mood and Affect: Mood normal.         Behavior: Behavior normal.         Thought Content: Thought content normal.         Judgment: Judgment normal.                Significant Labs: All pertinent labs within the past 24 hours have been reviewed.  CBC:   Recent Labs   Lab 12/14/23  1331 12/14/23 2121 12/14/23 2143   WBC  --  12.53  --    HGB 9.1* 9.9*  --    HCT 29.0* 33.0* 30*   PLT  --  473*  --      CMP:   Recent Labs   Lab 12/14/23 2121      K 4.0      CO2 22*   *   BUN 11   CREATININE 0.7   CALCIUM 9.1   PROT 7.4   ALBUMIN 3.4*   BILITOT 0.2   ALKPHOS 78   AST 9*   ALT 11   ANIONGAP 11     Coagulation:   Recent Labs   Lab  "12/13/23  0830   INR 1.1   APTT 25.7     Lactic Acid: No results for input(s): "LACTATE" in the last 48 hours.  Lipase:   Recent Labs   Lab 12/14/23  2121   LIPASE 136*     Urine Studies:   Recent Labs   Lab 12/15/23  0311   COLORU Yellow   APPEARANCEUA Clear   PHUR 6.0   SPECGRAV >1.030*   PROTEINUA Negative   GLUCUA Negative   KETONESU Negative   BILIRUBINUA Negative   OCCULTUA Negative   NITRITE Negative   LEUKOCYTESUR Negative       Significant Imaging: I have reviewed all pertinent imaging results/findings within the past 24 hours.    CT: I have reviewed all pertinent results/findings within the past 24 hours and findings are:     1. Small circumscribed fluid collection adjacent to the pancreatic body and tail measuring 1.5 x 4.3 cm, nonspecific though could be associated with acute pancreatitis.  Recommend clinical correlation and follow-up.  2. Mild free fluid adjacent to the stomach and liver without loculation or wall thickening.  A cyst drainage catheter into the stomach was noted in this region on the recent prior study, removed in the interval.  3. Hepatosplenomegaly.  4. Mild urinary bladder wall thickening.  Cystitis is a consideration.  5. Possible constipation.    CXR: I have reviewed all pertinent results/findings within the past 24 hours and findings are:     No consolidation, pleural effusion or pneumothorax.  Cardiomediastinal silhouette is unremarkable.    Assessment/Plan:     * Necrotizing pancreatitis  Patient with necrotizing pancreatitis c/b chronic peripancreatic fluid collections, initially diagnosed in September 2023 likely 2/2 chronic pancreatis from alcohol abuse. Per chart review, last drink in September. Patient has had multiple hospital admissions and ED presentations at [facility] and [facility] for symptoms related to his chronic necrotizing pancreatitis including abdominal pain, n/v and melena. He has required multiple abdominal procedures including EGD, IR guided aspiration of fluid " collection, EUS with necrosectomy, cystogastrostomy tubes. He has chronic pain which is difficult to manage. Patient presented to the ED with 1010 epigastric pain, nausea and an episode of hematemesis after eating min and taking oxy 5. He is HDS but tachycardic likely 2/2 to pain. His lipase is newly elevated to 126 and has an interval mild increase in size of his fluid collection. He is admitted for further workup of acute flare of chronic pancreatis, AES consult and pain management.     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 Unlikely cause of current presentation, but could consider repeat CTA, although careful consideration should be take in anticoagulation if findings suggest extension of thrombosis.       Hematemesis  Patient with history of known MW tear noted on EGD 9/2023 presents with nausea and 1x hematemesis. Patient says he has a small amount of blood in his vomit. He takes pantoprazole 40 BID at home. He also endorses some melena the last couple weeks which he was told would be expected with his cystogastrostomy tubes. Last BM Monday 12/14. He is HDS and Hb is stable.     - continue IV pantoprazole 40 BID as patient is unable to tolerate PO   - AES consulted   - if patient has large volume hematemesis or melena, becomes HD unstable will get stat CBC, activate GIB protocol and call GI       HIV infection  Continue home medications, Biktarvy         VTE Risk Mitigation (From admission,  onward)           Ordered     IP VTE HIGH RISK PATIENT  Once         12/15/23 0316     Place sequential compression device  Until discontinued         12/15/23 0316                           On 12/15/2023, patient should be placed in hospital observation services under my care in collaboration with [facility] Medicine Team 2.         Mattie Walker MD  Department of Hospital Medicine  [facility] - Emergency Dept

## 2023-12-15 NOTE — ED TRIAGE NOTES
Pt arrives to ED with epigastric abdominal pain that is radiating throughout his abdomen. Pt states a hx of pancreatitis and states this feels the same as his last flare up. Pt states having stent removed from his stomach yesterday and states that the stents were draining fluid from his abdomen to his stomach. Pt endorses CP and SOB. Pt endorses N/V.

## 2023-12-15 NOTE — CONSULTS
[facility]  Gastroenterology  Consult Note    Patient Name: Tasia Cardona  MRN: [id number]  Admission Date: 12/14/2023  Hospital Length of Stay: 0 days  Code Status: Full Code   Attending Provider: Genna Samuels MD   Consulting Provider: Eren Orellana MD  Primary Care Physician: Pina Jones NP  Principal Problem:Acute on chronic pancreatitis    Inpatient consult to [facility] ([facility])  Consult performed by: Eren Orellana MD  Consult ordered by: Mattie Walker MD        Subjective:     HPI:   Mr Cardona is a 24yo PMHx HIV, PCV with splenic vein thrombosis not on AC, asthma, necrotizing pancreatitis presented to [facility] on 12/15 with abdominal pain.    AES consulted for pancreatic fluid collection    Hospitalized 10//23/2023 for pancreatitis.     He had EUS 10/23/2023 for pancreatic necrosis. EUS was not suitable for endoscopic transmural stenting/ drainage. There was comment for interval CT in 4-6 weeks with consideration of EUS drainage and or ERCP to assess/ treat for possible PD disruption.      Patient did eventually have EUS cystgastrostomy conducted 11/21 at [facility] with several necrosectomies.Removed 12/13?    VS since arrival unremarkable  CBC w/o leukocytosis, Hgb 9.9-->9.6, Plts wnl  BMP w/ Cr 0.5  LFTs w/ BR 0.2, ALP wnl, AST/ ALT wnl  Lipase 136    CTAP w/ IV contrast with unremarkable GB and bile ducts.  Pancreas: No mass is detected.  There is a small fluid collection adjacent to the pancreatic body and tail measuring 1.5 x 4.3 cm on axial 68 and 69.     Mild free fluid adjacent to the stomach and the liver without loculation or wall thickening.  Mild pancreatitis can not be excluded.  Increased artifact likely associated with suboptimal arm positioning.      Objective:     Vitals:    12/15/23 0816   BP: 118/71   Pulse: 106   Resp: 18   Temp: 99.1 °F (37.3 °C)       Constitutional:  not in acute distress and well developed  HENT: Head: Normal, normocephalic,  atraumatic.  Eyes: conjunctiva clear and sclera nonicteric  GI: soft, non-tender, without masses or organomegaly  Skin: normal color  Neurological: alert        Assessment/Plan:     22yo PMHx HIV, PCV with splenic vein thrombosis not on AC, asthma, necrotizing pancreatitis presented to [facility] on 12/15 with abdominal pain.    AES consulted for pancreatic fluid collection    Problem List:  Pancreatic fluid collection    Recommendations:  Current collection is too small for drainage  Continue IV fluids to ensure hydration  Advance diet as tolerated  Repeat CT in 6 weeks    Thank you for involving us in the care of Tasia Cardona. Please call with any additional questions, concerns or changes in the patient's clinical status. We will continue to follow.     Eren Orellana MD  Gastroenterology Fellow PGY VI  [facility]

## 2023-12-15 NOTE — ED NOTES
I-STAT Chem-8+ Results:   Value Reference Range   Sodium 137 136-145 mmol/L   Potassium  3.7 3.5-5.1 mmol/L   Chloride 102  mmol/L   Ionized Calcium 1.09 1.06-1.42 mmol/L   CO2 (measured) 22 23-29 mmol/L   Glucose 112  mg/dL   BUN 10 6-30 mg/dL   Creatinine 0.5 0.5-1.4 mg/dL   Hematocrit 30 36-54%

## 2023-12-15 NOTE — ED NOTES
Pt reports sustained 9/10 pain despite pain meds. Pt unable to provide urine sample citing movement to urinate will aggravate pain more & does not want to attempt until pain is better controlled. MD notified.

## 2023-12-15 NOTE — ASSESSMENT & PLAN NOTE
Patient with history of known MW tear noted on EGD 9/2023 presents with nausea and 1x hematemesis. Patient says he has a small amount of blood in his vomit. He takes pantoprazole 40 BID at home. He also endorses some melena the last couple weeks which he was told would be expected with his cystogastrostomy tubes. Last BM Monday 12/14. He is HDS and Hb is stable.     - continue IV pantoprazole 40 BID as patient is unable to tolerate PO   - AES consulted   - if patient has large volume hematemesis or melena, becomes HD unstable will get stat CBC, activate GIB protocol and call GI

## 2023-12-15 NOTE — ED PROVIDER NOTES
Encounter Date: 12/14/2023       History     Chief Complaint   Patient presents with    Abdominal Pain     Hx chronic pancreatitis and having intense abdominal pain since stent removal procedure yesterday, +N/V     23 y.o. 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  He was discharged today from [facility] after LAMS removal. Pain started shortly after. He has associated N/V. Notes a small amount of blood mixed in with his vomiting. Denies blood clots or grossly bloody vomit.  He tried taking at home dilaudid though vomited immediately after.  He also notes chest pain with breathing.  Denies fever, shortness of breath, constipation, melena, hematochezia, fever.    The history is provided by the patient.     Review of patient's allergies indicates:   Allergen Reactions    [place] Swelling    Pcn [penicillins] Hives     Tolerates cephalosporins    Pecan nut Swelling    Sulfa (sulfonamide antibiotics) Hives     Past Medical History:   Diagnosis Date    Acute necrotizing pancreatitis 09/20/2023    Alcohol use disorder 10/05/2023    Asthma     Hepatitis C antibody positive in blood 09/18/2023 9/2023 PCR negative    HIV infection 10/2021    Brandy-Chauhan tear 09/18/2023    Normocytic anemia 09/18/2023    Pancreatic pseudocyst/cyst 10/24/2023    Polycythemia vera 11/28/2021    Receives phlebotomy if Hct>45    Positive CMV IgG serology 09/21/2023    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     Past Surgical History:   Procedure Laterality Date    ENDOSCOPIC ULTRASOUND OF UPPER GASTROINTESTINAL TRACT N/A 10/23/2023    Procedure: ULTRASOUND, UPPER GI TRACT, ENDOSCOPIC;  Surgeon: Emil Villatoro MD;  Location: [address]);  Service: Endoscopy;  Laterality: N/A;    ERCP N/A 10/23/2023    Procedure: ERCP (ENDOSCOPIC RETROGRADE CHOLANGIOPANCREATOGRAPHY);  Surgeon:  Emil Villatoro MD;  Location: [facility] ([address] [place] [address]);  Service: Endoscopy;  Laterality: N/A;    ESOPHAGOGASTRODUODENOSCOPY N/A 9/18/2023    Procedure: EGD (ESOPHAGOGASTRODUODENOSCOPY);  Surgeon: Kg Cleaning MD;  Location: [facility] ([address] [place] [address]);  Service: Endoscopy;  Laterality: N/A;     Family History   Problem Relation Age of Onset    Pancreatitis Mother     Cancer Mother     Ovarian cancer Mother     No Known Problems Father     Cancer Brother     Breast cancer Maternal Grandmother      Social History     Tobacco Use    Smoking status: Former     Types: Cigarettes    Smokeless tobacco: Never   Substance Use Topics    Alcohol use: Not Currently    Drug use: Never     Review of Systems   Constitutional:  Negative for fever.   Respiratory:  Negative for shortness of breath.    Cardiovascular:  Positive for chest pain.   Gastrointestinal:  Positive for abdominal pain, nausea and vomiting.   Genitourinary:  Negative for dysuria.       Physical Exam     Initial Vitals [12/14/23 2045]   BP Pulse Resp Temp SpO2   127/74 100 16 97.8 °F (36.6 °C) 98 %      MAP       --         Physical Exam    Nursing note and vitals reviewed.  Constitutional: He appears well-developed and well-nourished. He is not diaphoretic. No distress.   HENT:   Head: Normocephalic and atraumatic.   Nose: Nose normal.   Eyes: Conjunctivae and EOM are normal.   Neck: Neck supple.   Cardiovascular:  Normal rate.           Pulmonary/Chest: No respiratory distress.   Abdominal: There is abdominal tenderness in the epigastric area. There is guarding.   Musculoskeletal:      Cervical back: Neck supple.     Neurological: He is alert and oriented to person, place, and time.   Skin: No rash noted.   Psychiatric: He has a normal mood and affect. His behavior is normal. Judgment and thought content normal.         ED Course   Procedures  Labs Reviewed   CBC W/ AUTO DIFFERENTIAL - Abnormal; Notable for the following components:       Result Value    RBC 4.01 (*)      Hemoglobin 9.9 (*)     Hematocrit 33.0 (*)     MCH 24.7 (*)     MCHC 30.0 (*)     RDW 18.1 (*)     Platelets 473 (*)     Gran # (ANC) 8.4 (*)     All other components within normal limits   COMPREHENSIVE METABOLIC PANEL - Abnormal; Notable for the following components:    CO2 22 (*)     Glucose 115 (*)     Albumin 3.4 (*)     AST 9 (*)     All other components within normal limits    Narrative:     Add on per Yonny order# [id number] Troponin 21:52   LIPASE - Abnormal; Notable for the following components:    Lipase 136 (*)     All other components within normal limits    Narrative:     Add on per Yonny order# [id number] Troponin 21:52   ISTAT PROCEDURE - Abnormal; Notable for the following components:    POC Glucose 112 (*)     POC TCO2 (MEASURED) 22 (*)     POC Hematocrit 30 (*)     All other components within normal limits   CULTURE, BLOOD   CULTURE, BLOOD   TROPONIN I   B-TYPE NATRIURETIC PEPTIDE   URINALYSIS, REFLEX TO URINE CULTURE   TROPONIN I   ISTAT CHEM8     EKG Readings: (Independently Interpreted)   Initial Reading: No STEMI. Rhythm: Sinus Tachycardia. Heart Rate: 106. Clinical Impression: Sinus Tachycardia   Nonspecific ST and T-wave changes        Imaging Results              X-Ray Chest AP Portable (Final result)  Result time 12/14/23 21:48:57      Final result by Nimesh Stockton MD (12/14/23 21:48:57)                   Impression:      No acute findings in the chest.      Electronically signed by: Nimesh Stockton MD  Date:    12/14/2023  Time:    21:48               Narrative:    EXAMINATION:  XR CHEST AP PORTABLE    CLINICAL HISTORY:  Epigastric pain    TECHNIQUE:  Single frontal view of the chest was performed.    COMPARISON:  11/14/2023.    FINDINGS:  No consolidation, pleural effusion or pneumothorax.    Cardiomediastinal silhouette is unremarkable.                                       Medications   sodium chloride 0.9% bolus 1,000 mL 1,000 mL (1,000 mLs Intravenous New Bag 12/14/23 2121)    HYDROmorphone injection 0.5 mg (has no administration in time range)   morphine injection 4 mg (4 mg Intravenous Given 12/14/23 2117)   ondansetron injection 4 mg (4 mg Intravenous Given 12/14/23 2117)     Medical Decision Making  23 y.o. 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  Ill-appearing. Hemodynamically stable. Afebrile. Exam as above. I will initiate workup and reassess.    Ddx:  Acute pancreatitis, pseudocyst, bowel perforation    Labs without leukocytosis.  Anemia though stable.  Lipase acutely elevated at 136.  This is elevated from 9 days ago when levels were within normal limits.  Chest x-ray without infiltrate or effusion.  Pending workup at this time.  Will sign off to night Dr. Yonny MILLIGAN at this time due to shift change.                    ED Course as of 12/14/23 2211   Thu Dec 14, 2023   2123 EKG 12-lead  No STEMI. [LP]      ED Course User Index  [LP] Smith Sampson III, MD                           Clinical Impression:  Final diagnoses:  [R10.9] Abdominal pain  [R10.13] Epigastric pain  [R07.9] Chest pain                 Remedios Russ PA-C  12/14/23 2211

## 2023-12-15 NOTE — ASSESSMENT & PLAN NOTE
Patient with necrotizing pancreatitis c/b chronic peripancreatic fluid collections, initially diagnosed in September 2023 likely 2/2 chronic pancreatis from alcohol abuse. Per chart review, last drink in September. Patient has had multiple hospital admissions and ED presentations at [facility] and [facility] for symptoms related to his chronic necrotizing pancreatitis including abdominal pain, n/v and melena. He has required multiple abdominal procedures including EGD, IR guided aspiration of fluid collection, EUS with necrosectomy, cystogastrostomy tubes. He has chronic pain which is difficult to manage. Patient presented to the ED with 1010 epigastric pain, nausea and an episode of hematemesis after eating min and taking oxy 5. He is HDS but tachycardic likely 2/2 to pain. His lipase is newly elevated to 126 and has an interval mild increase in size of his fluid collection. He is admitted for further workup of acute flare of chronic pancreatis, AES consult and pain management.     - AES consult for increased peripancreatic fluid collection  - NPO, IVF  - Pain control: patient takes oxy 5 and dilaudid 2 at home, unable to tolerate PO at the moment d/t nausea    - IV dilaudid 1mg q4h while NPO   - may need to add more IV for breakthrough pain   - phenergan PRN for nausea

## 2023-12-16 LAB
ALBUMIN SERPL BCP-MCNC: 2.7 G/DL (ref 3.5–5.2)
ALP SERPL-CCNC: 68 U/L (ref 55–135)
ALT SERPL W/O P-5'-P-CCNC: 5 U/L (ref 10–44)
ANION GAP SERPL CALC-SCNC: 9 MMOL/L (ref 8–16)
AST SERPL-CCNC: 10 U/L (ref 10–40)
BASOPHILS # BLD AUTO: 0.03 K/UL (ref 0–0.2)
BASOPHILS NFR BLD: 0.4 % (ref 0–1.9)
BILIRUB SERPL-MCNC: 0.7 MG/DL (ref 0.1–1)
BUN SERPL-MCNC: 6 MG/DL (ref 6–20)
CALCIUM SERPL-MCNC: 8.6 MG/DL (ref 8.7–10.5)
CHLORIDE SERPL-SCNC: 101 MMOL/L (ref 95–110)
CO2 SERPL-SCNC: 23 MMOL/L (ref 23–29)
CREAT SERPL-MCNC: 0.6 MG/DL (ref 0.5–1.4)
DIFFERENTIAL METHOD: ABNORMAL
EOSINOPHIL # BLD AUTO: 0.1 K/UL (ref 0–0.5)
EOSINOPHIL NFR BLD: 1.7 % (ref 0–8)
ERYTHROCYTE [DISTWIDTH] IN BLOOD BY AUTOMATED COUNT: 18 % (ref 11.5–14.5)
EST. GFR  (NO RACE VARIABLE): >60 ML/MIN/1.73 M^2
GLUCOSE SERPL-MCNC: 82 MG/DL (ref 70–110)
HCT VFR BLD AUTO: 26.9 % (ref 40–54)
HGB BLD-MCNC: 8.1 G/DL (ref 14–18)
IMM GRANULOCYTES # BLD AUTO: 0.03 K/UL (ref 0–0.04)
IMM GRANULOCYTES NFR BLD AUTO: 0.4 % (ref 0–0.5)
LYMPHOCYTES # BLD AUTO: 1.7 K/UL (ref 1–4.8)
LYMPHOCYTES NFR BLD: 20.1 % (ref 18–48)
MCH RBC QN AUTO: 25 PG (ref 27–31)
MCHC RBC AUTO-ENTMCNC: 30.1 G/DL (ref 32–36)
MCV RBC AUTO: 83 FL (ref 82–98)
MONOCYTES # BLD AUTO: 1 K/UL (ref 0.3–1)
MONOCYTES NFR BLD: 12 % (ref 4–15)
NEUTROPHILS # BLD AUTO: 5.5 K/UL (ref 1.8–7.7)
NEUTROPHILS NFR BLD: 65.4 % (ref 38–73)
NRBC BLD-RTO: 0 /100 WBC
PLATELET # BLD AUTO: 301 K/UL (ref 150–450)
PMV BLD AUTO: 10.8 FL (ref 9.2–12.9)
POTASSIUM SERPL-SCNC: 3.4 MMOL/L (ref 3.5–5.1)
PROT SERPL-MCNC: 6.2 G/DL (ref 6–8.4)
RBC # BLD AUTO: 3.24 M/UL (ref 4.6–6.2)
SODIUM SERPL-SCNC: 133 MMOL/L (ref 136–145)
WBC # BLD AUTO: 8.44 K/UL (ref 3.9–12.7)

## 2023-12-16 PROCEDURE — 12000002 HC ACUTE/MED SURGE SEMI-PRIVATE ROOM

## 2023-12-16 PROCEDURE — C9113 INJ PANTOPRAZOLE SODIUM, VIA: HCPCS

## 2023-12-16 PROCEDURE — 25000003 PHARM REV CODE 250

## 2023-12-16 PROCEDURE — 80053 COMPREHEN METABOLIC PANEL: CPT

## 2023-12-16 PROCEDURE — 63600175 PHARM REV CODE 636 W HCPCS

## 2023-12-16 PROCEDURE — 85025 COMPLETE CBC W/AUTO DIFF WBC: CPT

## 2023-12-16 PROCEDURE — 36415 COLL VENOUS BLD VENIPUNCTURE: CPT

## 2023-12-16 RX ORDER — OXYCODONE AND ACETAMINOPHEN 10; 325 MG/1; MG/1
1 TABLET ORAL EVERY 4 HOURS PRN
Status: DISCONTINUED | OUTPATIENT
Start: 2023-12-16 | End: 2023-12-16

## 2023-12-16 RX ORDER — TALC
6 POWDER (GRAM) TOPICAL NIGHTLY PRN
Status: DISCONTINUED | OUTPATIENT
Start: 2023-12-16 | End: 2023-12-21 | Stop reason: HOSPADM

## 2023-12-16 RX ORDER — SODIUM CHLORIDE 9 MG/ML
INJECTION, SOLUTION INTRAVENOUS CONTINUOUS
Status: ACTIVE | OUTPATIENT
Start: 2023-12-16 | End: 2023-12-16

## 2023-12-16 RX ORDER — PANTOPRAZOLE SODIUM 40 MG/1
40 TABLET, DELAYED RELEASE ORAL
Status: DISCONTINUED | OUTPATIENT
Start: 2023-12-16 | End: 2023-12-21 | Stop reason: HOSPADM

## 2023-12-16 RX ORDER — POTASSIUM CHLORIDE 20 MEQ/1
40 TABLET, EXTENDED RELEASE ORAL ONCE
Status: COMPLETED | OUTPATIENT
Start: 2023-12-16 | End: 2023-12-16

## 2023-12-16 RX ORDER — HYDROMORPHONE HYDROCHLORIDE 1 MG/ML
1 INJECTION, SOLUTION INTRAMUSCULAR; INTRAVENOUS; SUBCUTANEOUS EVERY 6 HOURS PRN
Status: DISCONTINUED | OUTPATIENT
Start: 2023-12-16 | End: 2023-12-18

## 2023-12-16 RX ADMIN — PROMETHAZINE HYDROCHLORIDE 12.5 MG: 25 INJECTION INTRAMUSCULAR; INTRAVENOUS at 10:12

## 2023-12-16 RX ADMIN — SENNOSIDES 8.6 MG: 8.6 TABLET, FILM COATED ORAL at 08:12

## 2023-12-16 RX ADMIN — SODIUM CHLORIDE: 9 INJECTION, SOLUTION INTRAVENOUS at 08:12

## 2023-12-16 RX ADMIN — POTASSIUM CHLORIDE 40 MEQ: 1500 TABLET, EXTENDED RELEASE ORAL at 08:12

## 2023-12-16 RX ADMIN — HYDROMORPHONE HYDROCHLORIDE 1 MG: 1 INJECTION, SOLUTION INTRAMUSCULAR; INTRAVENOUS; SUBCUTANEOUS at 01:12

## 2023-12-16 RX ADMIN — SODIUM CHLORIDE: 9 INJECTION, SOLUTION INTRAVENOUS at 05:12

## 2023-12-16 RX ADMIN — OXYCODONE AND ACETAMINOPHEN 1 TABLET: 10; 325 TABLET ORAL at 09:12

## 2023-12-16 RX ADMIN — OXYCODONE HYDROCHLORIDE AND ACETAMINOPHEN 1 TABLET: 5; 325 TABLET ORAL at 11:12

## 2023-12-16 RX ADMIN — FOLIC ACID 1 MG: 1 TABLET ORAL at 08:12

## 2023-12-16 RX ADMIN — PANTOPRAZOLE SODIUM 40 MG: 40 INJECTION, POWDER, FOR SOLUTION INTRAVENOUS at 08:12

## 2023-12-16 RX ADMIN — BICTEGRAVIR SODIUM, EMTRICITABINE, AND TENOFOVIR ALAFENAMIDE FUMARATE 1 TABLET: 50; 200; 25 TABLET ORAL at 08:12

## 2023-12-16 RX ADMIN — HYDROMORPHONE HYDROCHLORIDE 1 MG: 1 INJECTION, SOLUTION INTRAMUSCULAR; INTRAVENOUS; SUBCUTANEOUS at 08:12

## 2023-12-16 RX ADMIN — OXYCODONE AND ACETAMINOPHEN 1 TABLET: 10; 325 TABLET ORAL at 04:12

## 2023-12-16 RX ADMIN — Medication 6 MG: at 10:12

## 2023-12-16 RX ADMIN — HYDROMORPHONE HYDROCHLORIDE 1 MG: 1 INJECTION, SOLUTION INTRAMUSCULAR; INTRAVENOUS; SUBCUTANEOUS at 03:12

## 2023-12-16 RX ADMIN — PANTOPRAZOLE SODIUM 40 MG: 40 TABLET, DELAYED RELEASE ORAL at 04:12

## 2023-12-16 RX ADMIN — OXYCODONE HYDROCHLORIDE AND ACETAMINOPHEN 1 TABLET: 5; 325 TABLET ORAL at 02:12

## 2023-12-16 NOTE — PLAN OF CARE
Problem: Adult Inpatient Plan of Care  Goal: Plan of Care Review  Outcome: Ongoing, Progressing     Problem: Glycemic Control Impaired (Sepsis/Septic Shock)  Goal: Blood Glucose Level Within Desired Range  Outcome: Ongoing, Progressing     Problem: Infection Progression (Sepsis/Septic Shock)  Goal: Absence of Infection Signs and Symptoms  Outcome: Ongoing, Progressing     Problem: Nutrition Impaired (Sepsis/Septic Shock)  Goal: Optimal Nutrition Intake  Outcome: Ongoing, Progressing

## 2023-12-16 NOTE — ASSESSMENT & PLAN NOTE
Patient with history of known MW tear noted on EGD 9/2023 presents with nausea and 1x hematemesis. Patient says he has a small amount of blood in his vomit. He takes pantoprazole 40 BID at home. He also endorses some melena the last couple weeks which he was told would be expected with his cystogastrostomy tubes. Last BM Monday 12/14. No further episodes of hematemesis, tolerating clears    - PO pantoprazole 40 BID  - trend hxh

## 2023-12-16 NOTE — PLAN OF CARE
Problem: Adult Inpatient Plan of Care  Goal: Plan of Care Review  Outcome: Ongoing, Progressing  Goal: Patient-Specific Goal (Individualized)  Outcome: Ongoing, Progressing  Goal: Absence of Hospital-Acquired Illness or Injury  Outcome: Ongoing, Progressing  Goal: Optimal Comfort and Wellbeing  Outcome: Ongoing, Progressing  Goal: Readiness for Transition of Care  Outcome: Ongoing, Progressing    Patient presents standing bedside. Patient currently denies any pain at this time. Bed in lowest position, bed wheels locked, call light within reach, side rails up x2. Instructed patient to call if any assist is needed. Patient verbalized understanding.

## 2023-12-16 NOTE — ASSESSMENT & PLAN NOTE
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

## 2023-12-16 NOTE — PROGRESS NOTES
[facility] - Intensive Care ([address] Medicine  Progress Note    Patient Name: Tasia Cardona  MRN: [id number]  Patient Class: IP- Inpatient   Admission Date: 12/14/2023  Length of Stay: 1 days  Attending Physician: Genna Samuels MD  Primary Care Provider: Pina Jones NP        Subjective:     Principal Problem:Acute on chronic pancreatitis        HPI:  Mr. Cardona is a 24yo M with a PMHx of necrotizing pancreatis with multiple abdominal procedures, HIV, polycythemia vera c/b splenic vein thrombosis (no anticoagulation), asthma, anemia, Corinne Jeronimo tear (9/2023) presents to the ED with 10/10 epigastric pain, hematemesis that started this evening. Patient states he was discharged from [facility] yesterday after removal of cystogastrostomy stent, had min at home and developed abdominal pain. He took an oxy 5 and had 1x episode of hematemesis. He came to [facility] as it is closer to his house. He reports some associated CP and SOB especially with deep breaths or speaking. Has issues with constipation related to his chronic opioid use, his last BM was Monday 12/11. He reports some melanic stools the last 1-2 weeks that he was told would be expected with his recent cystogastrostomy placements and exchanges. He feels they are improving. Upon chart review, he has had intermittent melena the last 6 months.     Of note, the patient has had multiple ED admissions and hospitalizations at [facility] and [facility] for symptoms related to his necrotizing pancreatitis including severe abdominal pain, n/v. He has undergone IR guided aspiration of peripancreatic collections, had cystogastrostomy tubes placed as well as two necrosectomies. He has associated chronic pain that proves difficult to manage.     In the ED, pt afebrile, HDS on RA. CBC with WCC wnl, stable anemia Hb 9.9. CMP unremarkable. Lipase elevated to 136, last noted to be 26 on 12/13 at [facility]. BNP and troponin wnl. CTAP showing slight increase in  peripancreatic collection from 2.9cm x 1.4cm at [facility] on 12/9 to 4.3cm x 1.5cm. Patient received IL NS bolus, IV morphine and dilaudid for pain control and was made NPO. He is being admitted to observation for acute flare of his chronic pancreatitis and pain control.     Overview/Hospital Course:  Mr. Cardona is a 22yo M with a PMHx of necrotizing pancreatis with multiple abdominal procedures, HIV, polycythemia vera c/b splenic vein thrombosis (no anticoagulation), asthma, anemia, Corinne Jeronimo tear (9/2023) admitted for epigastric pain, and hematemesis. On arrival pt afebrile, HDS on RA. WBC 12.5, stable anemia Hb 9.9. CMP unremarkable. Lipase elevated to 136, last noted to be 26 on 12/13 at [facility]. BNP and troponin wnl. CTAP showing slight increase in peripancreatic collection from 2.9cm x 1.4cm at [facility] on 12/9 to 4.3cm x 1.5cm. Patient received IL NS bolus, IV morphine and dilaudid for pain control and was made NPO. AES consulted for evaluation of peripancreatic fluid collection in setting of acute on chronic pancreatitis. Patient started on protonix given hx of hematemesis. Hgb 5.6 on am blood draw, repeat 9.6. AES w/ no need for intervention for stable pancreatic fluid collection. Advancing diet as tolerated, pain control, IVF in setting of pancreatitis.     Interval History: NAEON. Patient reports pain is overall unchanged in character and intensity from yesterday, but is getting relief with pain meds. Patient reports no further episodes of vomiting/hematemesis. Patient otherwise has no acute complaints.    Review of Systems   Constitutional:  Negative for chills, fatigue and fever.   HENT:  Negative for sore throat.    Eyes:  Negative for visual disturbance.   Respiratory:  Negative for cough, choking and wheezing.    Cardiovascular:  Negative for chest pain, palpitations and leg swelling.   Gastrointestinal:  Positive for abdominal pain (midepigastric abdominal pain, unchanged from prior). Negative for  Continue Regimen: Apply Aklief nightly to scalp constipation, diarrhea and vomiting.   Genitourinary:  Negative for dysuria and urgency.   Musculoskeletal:  Negative for myalgias.   Skin:  Negative for rash.   Neurological:  Negative for light-headedness.     Objective:     Vital Signs (Most Recent):  Temp: 99.5 °F (37.5 °C) (12/16/23 0849)  Pulse: 102 (12/16/23 0849)  Resp: 18 (12/16/23 1301)  BP: 118/70 (12/16/23 0849)  SpO2: (!) 94 % (12/16/23 0849) Vital Signs (24h Range):  Temp:  [98.2 °F (36.8 °C)-100.4 °F (38 °C)] 99.5 °F (37.5 °C)  Pulse:  [] 102  Resp:  [16-21] 18  SpO2:  [94 %-98 %] 94 %  BP: (107-118)/(63-73) 118/70     Weight: 65 kg (143 lb 4.8 oz)  Body mass index is 20.56 kg/m².  No intake or output data in the 24 hours ending 12/16/23 1405      Physical Exam  Constitutional:       Appearance: Normal appearance.   HENT:      Head: Normocephalic and atraumatic.      Right Ear: External ear normal.      Left Ear: External ear normal.   Eyes:      Extraocular Movements: Extraocular movements intact.   Cardiovascular:      Rate and Rhythm: Normal rate and regular rhythm.      Pulses: Normal pulses.      Heart sounds: Normal heart sounds.   Pulmonary:      Effort: Pulmonary effort is normal.      Breath sounds: Normal breath sounds.   Abdominal:      General: Abdomen is flat. There is no distension.      Palpations: Abdomen is soft.      Tenderness: There is abdominal tenderness. There is no guarding or rebound.   Musculoskeletal:         General: Normal range of motion.   Skin:     Capillary Refill: Capillary refill takes less than 2 seconds.   Neurological:      General: No focal deficit present.      Mental Status: He is alert and oriented to person, place, and time.             Significant Labs: All pertinent labs within the past 24 hours have been reviewed.  CBC:   Recent Labs   Lab 12/15/23  0350 12/15/23  0522 12/16/23  0512   WBC 13.20* 11.34 8.44   HGB 5.6* 9.6* 8.1*   HCT 19.1* 31.9* 26.9*    362 301     CMP:   Recent Labs   Lab  12/14/23  2121 12/15/23  0350 12/16/23  0512    138 133*   K 4.0 3.8 3.4*    111* 101   CO2 22* 17* 23   * 103 82   BUN 11 7 6   CREATININE 0.7 0.5 0.6   CALCIUM 9.1 7.5* 8.6*   PROT 7.4 6.0 6.2   ALBUMIN 3.4* 2.7* 2.7*   BILITOT 0.2 0.2 0.7   ALKPHOS 78 60 68   AST 9* 13 10   ALT 11 6* 5*   ANIONGAP 11 10 9       Significant Imaging: I have reviewed all pertinent imaging results/findings within the past 24 hours.    Assessment/Plan:      * Acute on chronic pancreatitis  Patient with necrotizing pancreatitis c/b chronic peripancreatic fluid collections, initially diagnosed in September 2023 likely 2/2 chronic pancreatitis from alcohol abuse. Per chart review, last drink in September. Patient has had multiple hospital admissions and ED presentations at [facility] and [facility] for symptoms related to his chronic necrotizing pancreatitis including abdominal pain, n/v and melena. He has required multiple abdominal procedures including EGD, IR guided aspiration of fluid collection, EUS with necrosectomy, cystogastrostomy tubes. He has chronic pain which is difficult to manage. lipase is newly elevated to 126 and has an interval mild increase in size of his fluid collection. He is admitted for further workup of acute flare of chronic pancreatis, AES consult and pain management. AES w/ no intervention for pancreatic fluid collection, advance diet as tolerated. Patient w/ no further vomiting, pain relieved w/ pain meds.    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     Hematemesis  Patient with history of known MW tear noted on EGD 9/2023 presents with nausea and 1x hematemesis. Patient says he has a small amount of blood in his vomit. He takes pantoprazole 40 BID at home. He also endorses some melena the last couple weeks which he was told would be expected with his cystogastrostomy tubes. Last BM Monday 12/14. No further episodes of hematemesis, tolerating clears    - PO pantoprazole 40 BID  - trend hxh      HIV infection  Continue home medications, Biktarvy         VTE Risk Mitigation (From admission, onward)           Ordered     IP VTE HIGH RISK PATIENT  Once         12/15/23 0316     Place sequential compression device  Until discontinued         12/15/23 0316                    Discharge Planning   CASTILLO: 12/20/2023     Code Status: Full Code   Is the patient medically ready for discharge?:     Reason for patient still in hospital (select all that apply): Patient trending condition                     Rashel Mitchell MD  Department of Hospital Medicine   [facility] - Intensive Care ([place]-[facility])     Detail Level: Zone Plan: Patient completed a 6mo course of isotretinoin therapy. \\n\\nElevated trig and cholesterol prior to and during therapy, pt is monitored by his PCP. Initiate Treatment: Apply Mometasone twice daily to the neck and upper back x 4 weeks.

## 2023-12-16 NOTE — HOSPITAL COURSE
Mr. Cardona is a 22yo M with a PMHx of necrotizing pancreatis with multiple abdominal procedures, HIV, polycythemia vera c/b splenic vein thrombosis (no anticoagulation), asthma, anemia, Corinne Jeronimo tear (9/2023) admitted for epigastric pain, and hematemesis. On arrival pt afebrile, HDS on RA. WBC 12.5, stable anemia Hb 9.9. CMP unremarkable. Lipase elevated to 136, last noted to be 26 on 12/13 at [facility]. BNP and troponin wnl. CTAP showing slight increase in peripancreatic collection from 2.9cm x 1.4cm at [facility] on 12/9 to 4.3cm x 1.5cm. Patient received IL NS bolus, IV morphine and dilaudid for pain control and was made NPO. AES consulted for evaluation of peripancreatic fluid collection in setting of acute on chronic pancreatitis. Patient started on protonix given hx of hematemesis. Hgb 5.6 on am blood draw, repeat 9.6. AES w/ no need for intervention for stable pancreatic fluid collection. Advancing diet as tolerated, pain control, IVF in setting of pancreatitis. Weaning IV pain medications as able. Patient able to tolerate regular diet without issue, beginning on 12/18 AM. Intermittent episodes of vomiting overnight. Small bowel movement on evening of 12/19 following multiple doses of lactulose. IV dilaudid discontinued on 12/20; PO dilaudid added and oxycodone discontinued. Additionally, robaxin and lyrica added on. On day of discharged, patient tolerating diet without complication, having bowel movements, and agreeable to discharge with oral pain medications. Plan to discharge patient with one week of PO dilaudid Q6H and MS contin BID.  checked. Referral placed for pain management clinic.

## 2023-12-16 NOTE — SUBJECTIVE & OBJECTIVE
Interval History: NAEON. Patient reports pain is overall unchanged in character and intensity from yesterday, but is getting relief with pain meds. Patient reports no further episodes of vomiting/hematemesis. Patient otherwise has no acute complaints.    Review of Systems   Constitutional:  Negative for chills, fatigue and fever.   HENT:  Negative for sore throat.    Eyes:  Negative for visual disturbance.   Respiratory:  Negative for cough, choking and wheezing.    Cardiovascular:  Negative for chest pain, palpitations and leg swelling.   Gastrointestinal:  Positive for abdominal pain (midepigastric abdominal pain, unchanged from prior). Negative for constipation, diarrhea and vomiting.   Genitourinary:  Negative for dysuria and urgency.   Musculoskeletal:  Negative for myalgias.   Skin:  Negative for rash.   Neurological:  Negative for light-headedness.     Objective:     Vital Signs (Most Recent):  Temp: 99.5 °F (37.5 °C) (12/16/23 0849)  Pulse: 102 (12/16/23 0849)  Resp: 18 (12/16/23 1301)  BP: 118/70 (12/16/23 0849)  SpO2: (!) 94 % (12/16/23 0849) Vital Signs (24h Range):  Temp:  [98.2 °F (36.8 °C)-100.4 °F (38 °C)] 99.5 °F (37.5 °C)  Pulse:  [] 102  Resp:  [16-21] 18  SpO2:  [94 %-98 %] 94 %  BP: (107-118)/(63-73) 118/70     Weight: 65 kg (143 lb 4.8 oz)  Body mass index is 20.56 kg/m².  No intake or output data in the 24 hours ending 12/16/23 1405      Physical Exam  Constitutional:       Appearance: Normal appearance.   HENT:      Head: Normocephalic and atraumatic.      Right Ear: External ear normal.      Left Ear: External ear normal.   Eyes:      Extraocular Movements: Extraocular movements intact.   Cardiovascular:      Rate and Rhythm: Normal rate and regular rhythm.      Pulses: Normal pulses.      Heart sounds: Normal heart sounds.   Pulmonary:      Effort: Pulmonary effort is normal.      Breath sounds: Normal breath sounds.   Abdominal:      General: Abdomen is flat. There is no distension.       Palpations: Abdomen is soft.      Tenderness: There is abdominal tenderness. There is no guarding or rebound.   Musculoskeletal:         General: Normal range of motion.   Skin:     Capillary Refill: Capillary refill takes less than 2 seconds.   Neurological:      General: No focal deficit present.      Mental Status: He is alert and oriented to person, place, and time.             Significant Labs: All pertinent labs within the past 24 hours have been reviewed.  CBC:   Recent Labs   Lab 12/15/23  0350 12/15/23  0522 12/16/23  0512   WBC 13.20* 11.34 8.44   HGB 5.6* 9.6* 8.1*   HCT 19.1* 31.9* 26.9*    362 301     CMP:   Recent Labs   Lab 12/14/23  2121 12/15/23  0350 12/16/23  0512    138 133*   K 4.0 3.8 3.4*    111* 101   CO2 22* 17* 23   * 103 82   BUN 11 7 6   CREATININE 0.7 0.5 0.6   CALCIUM 9.1 7.5* 8.6*   PROT 7.4 6.0 6.2   ALBUMIN 3.4* 2.7* 2.7*   BILITOT 0.2 0.2 0.7   ALKPHOS 78 60 68   AST 9* 13 10   ALT 11 6* 5*   ANIONGAP 11 10 9       Significant Imaging: I have reviewed all pertinent imaging results/findings within the past 24 hours.

## 2023-12-17 LAB
ALBUMIN SERPL BCP-MCNC: 2.6 G/DL (ref 3.5–5.2)
ALP SERPL-CCNC: 61 U/L (ref 55–135)
ALT SERPL W/O P-5'-P-CCNC: <5 U/L (ref 10–44)
ANION GAP SERPL CALC-SCNC: 10 MMOL/L (ref 8–16)
AST SERPL-CCNC: 8 U/L (ref 10–40)
BASOPHILS # BLD AUTO: 0.01 K/UL (ref 0–0.2)
BASOPHILS NFR BLD: 0.2 % (ref 0–1.9)
BILIRUB SERPL-MCNC: 0.4 MG/DL (ref 0.1–1)
BUN SERPL-MCNC: 4 MG/DL (ref 6–20)
CALCIUM SERPL-MCNC: 8.7 MG/DL (ref 8.7–10.5)
CHLORIDE SERPL-SCNC: 106 MMOL/L (ref 95–110)
CO2 SERPL-SCNC: 22 MMOL/L (ref 23–29)
CREAT SERPL-MCNC: 0.6 MG/DL (ref 0.5–1.4)
DIFFERENTIAL METHOD: ABNORMAL
EOSINOPHIL # BLD AUTO: 0.1 K/UL (ref 0–0.5)
EOSINOPHIL NFR BLD: 2.2 % (ref 0–8)
ERYTHROCYTE [DISTWIDTH] IN BLOOD BY AUTOMATED COUNT: 17.8 % (ref 11.5–14.5)
EST. GFR  (NO RACE VARIABLE): >60 ML/MIN/1.73 M^2
GLUCOSE SERPL-MCNC: 77 MG/DL (ref 70–110)
HCT VFR BLD AUTO: 24.3 % (ref 40–54)
HGB BLD-MCNC: 7.3 G/DL (ref 14–18)
IMM GRANULOCYTES # BLD AUTO: 0.01 K/UL (ref 0–0.04)
IMM GRANULOCYTES NFR BLD AUTO: 0.2 % (ref 0–0.5)
LYMPHOCYTES # BLD AUTO: 1.8 K/UL (ref 1–4.8)
LYMPHOCYTES NFR BLD: 29.9 % (ref 18–48)
MCH RBC QN AUTO: 24.7 PG (ref 27–31)
MCHC RBC AUTO-ENTMCNC: 30 G/DL (ref 32–36)
MCV RBC AUTO: 82 FL (ref 82–98)
MONOCYTES # BLD AUTO: 0.7 K/UL (ref 0.3–1)
MONOCYTES NFR BLD: 10.9 % (ref 4–15)
NEUTROPHILS # BLD AUTO: 3.4 K/UL (ref 1.8–7.7)
NEUTROPHILS NFR BLD: 56.6 % (ref 38–73)
NRBC BLD-RTO: 0 /100 WBC
PLATELET # BLD AUTO: 249 K/UL (ref 150–450)
PMV BLD AUTO: 10.1 FL (ref 9.2–12.9)
POTASSIUM SERPL-SCNC: 3.5 MMOL/L (ref 3.5–5.1)
PROT SERPL-MCNC: 6.2 G/DL (ref 6–8.4)
RBC # BLD AUTO: 2.96 M/UL (ref 4.6–6.2)
SODIUM SERPL-SCNC: 138 MMOL/L (ref 136–145)
WBC # BLD AUTO: 5.95 K/UL (ref 3.9–12.7)

## 2023-12-17 PROCEDURE — 25000003 PHARM REV CODE 250

## 2023-12-17 PROCEDURE — 80053 COMPREHEN METABOLIC PANEL: CPT

## 2023-12-17 PROCEDURE — 63600175 PHARM REV CODE 636 W HCPCS

## 2023-12-17 PROCEDURE — 36415 COLL VENOUS BLD VENIPUNCTURE: CPT

## 2023-12-17 PROCEDURE — 85025 COMPLETE CBC W/AUTO DIFF WBC: CPT

## 2023-12-17 PROCEDURE — 12000002 HC ACUTE/MED SURGE SEMI-PRIVATE ROOM

## 2023-12-17 RX ORDER — ACETAMINOPHEN 500 MG
1000 TABLET ORAL 3 TIMES DAILY
Status: DISCONTINUED | OUTPATIENT
Start: 2023-12-17 | End: 2023-12-21 | Stop reason: HOSPADM

## 2023-12-17 RX ADMIN — PANTOPRAZOLE SODIUM 40 MG: 40 TABLET, DELAYED RELEASE ORAL at 05:12

## 2023-12-17 RX ADMIN — HYDROMORPHONE HYDROCHLORIDE 1 MG: 1 INJECTION, SOLUTION INTRAMUSCULAR; INTRAVENOUS; SUBCUTANEOUS at 02:12

## 2023-12-17 RX ADMIN — HYDROMORPHONE HYDROCHLORIDE 1 MG: 1 INJECTION, SOLUTION INTRAMUSCULAR; INTRAVENOUS; SUBCUTANEOUS at 08:12

## 2023-12-17 RX ADMIN — ACETAMINOPHEN 1000 MG: 500 TABLET ORAL at 08:12

## 2023-12-17 RX ADMIN — Medication 6 MG: at 08:12

## 2023-12-17 RX ADMIN — POLYETHYLENE GLYCOL 3350 17 G: 17 POWDER, FOR SOLUTION ORAL at 08:12

## 2023-12-17 RX ADMIN — FOLIC ACID 1 MG: 1 TABLET ORAL at 08:12

## 2023-12-17 RX ADMIN — OXYCODONE HYDROCHLORIDE 15 MG: 10 TABLET ORAL at 04:12

## 2023-12-17 RX ADMIN — OXYCODONE HYDROCHLORIDE 15 MG: 10 TABLET ORAL at 12:12

## 2023-12-17 RX ADMIN — PROMETHAZINE HYDROCHLORIDE 12.5 MG: 25 INJECTION INTRAMUSCULAR; INTRAVENOUS at 08:12

## 2023-12-17 RX ADMIN — OXYCODONE HYDROCHLORIDE 15 MG: 10 TABLET ORAL at 05:12

## 2023-12-17 RX ADMIN — OXYCODONE HYDROCHLORIDE 15 MG: 10 TABLET ORAL at 10:12

## 2023-12-17 RX ADMIN — SENNOSIDES 8.6 MG: 8.6 TABLET, FILM COATED ORAL at 08:12

## 2023-12-17 RX ADMIN — BICTEGRAVIR SODIUM, EMTRICITABINE, AND TENOFOVIR ALAFENAMIDE FUMARATE 1 TABLET: 50; 200; 25 TABLET ORAL at 12:12

## 2023-12-17 NOTE — ASSESSMENT & PLAN NOTE
Patient with history of known MW tear noted on EGD 9/2023 presents with nausea and 1x hematemesis. Patient says he has a small amount of blood in his vomit. He takes pantoprazole 40 BID at home. He also endorses some melena the last couple weeks which he was told would be expected with his cystogastrostomy tubes. Last BM Monday 12/14. No further episodes of hematemesis, tolerating clears    - PO pantoprazole 40 BID  - trend hxh  - Resolved at present

## 2023-12-17 NOTE — SUBJECTIVE & OBJECTIVE
Interval History: Overnight with episode of severe pain, had PO oxy PRN increased to oxy 15 from 10. Still reporting pain refractory to any medication other than IV dilaudid. Continuing to adjust PO pain management regimen.    Review of Systems   Constitutional:  Negative for chills, fatigue and fever.   HENT:  Negative for sore throat.    Eyes:  Negative for visual disturbance.   Respiratory:  Negative for cough, choking and wheezing.    Cardiovascular:  Negative for chest pain, palpitations and leg swelling.   Gastrointestinal:  Positive for abdominal pain (midepigastric abdominal pain, unchanged from prior). Negative for constipation, diarrhea and vomiting.   Genitourinary:  Negative for dysuria and urgency.   Musculoskeletal:  Negative for myalgias.   Skin:  Negative for rash.   Neurological:  Negative for light-headedness.     Objective:     Vital Signs (Most Recent):  Temp: 99.1 °F (37.3 °C) (12/17/23 1200)  Pulse: 95 (12/17/23 1200)  Resp: 17 (12/17/23 1451)  BP: (!) 134/55 (12/17/23 1200)  SpO2: 98 % (12/17/23 1200) Vital Signs (24h Range):  Temp:  [98.2 °F (36.8 °C)-100 °F (37.8 °C)] 99.1 °F (37.3 °C)  Pulse:  [81-95] 95  Resp:  [16-20] 17  SpO2:  [95 %-98 %] 98 %  BP: (107-134)/(55-70) 134/55     Weight: 65 kg (143 lb 4.8 oz)  Body mass index is 20.56 kg/m².  No intake or output data in the 24 hours ending 12/17/23 1543      Physical Exam  Constitutional:       Appearance: Normal appearance.   HENT:      Head: Normocephalic and atraumatic.      Right Ear: External ear normal.      Left Ear: External ear normal.   Eyes:      Extraocular Movements: Extraocular movements intact.   Cardiovascular:      Rate and Rhythm: Normal rate and regular rhythm.      Pulses: Normal pulses.      Heart sounds: Normal heart sounds.   Pulmonary:      Effort: Pulmonary effort is normal.      Breath sounds: Normal breath sounds.   Abdominal:      General: Abdomen is flat. There is no distension.      Palpations: Abdomen is  soft.      Tenderness: There is abdominal tenderness. There is no guarding or rebound.   Musculoskeletal:         General: Normal range of motion.   Skin:     Capillary Refill: Capillary refill takes less than 2 seconds.   Neurological:      General: No focal deficit present.      Mental Status: He is alert and oriented to person, place, and time.             Significant Labs: All pertinent labs within the past 24 hours have been reviewed.  CBC:   Recent Labs   Lab 12/16/23  0512 12/17/23  0441   WBC 8.44 5.95   HGB 8.1* 7.3*   HCT 26.9* 24.3*    249     CMP:   Recent Labs   Lab 12/16/23  0512 12/17/23  0441   * 138   K 3.4* 3.5    106   CO2 23 22*   GLU 82 77   BUN 6 4*   CREATININE 0.6 0.6   CALCIUM 8.6* 8.7   PROT 6.2 6.2   ALBUMIN 2.7* 2.6*   BILITOT 0.7 0.4   ALKPHOS 68 61   AST 10 8*   ALT 5* <5*   ANIONGAP 9 10       Significant Imaging: I have reviewed all pertinent imaging results/findings within the past 24 hours.

## 2023-12-17 NOTE — PROGRESS NOTES
[facility] ([address] Medicine  Progress Note    Patient Name: Tasia Cardona  MRN: [id number]  Patient Class: IP- Inpatient   Admission Date: 12/14/2023  Length of Stay: 2 days  Attending Physician: Genna Samuels MD  Primary Care Provider: Pina Jones NP        Subjective:     Principal Problem:Acute on chronic pancreatitis        HPI:  Mr. Cardona is a 22yo M with a PMHx of necrotizing pancreatis with multiple abdominal procedures, HIV, polycythemia vera c/b splenic vein thrombosis (no anticoagulation), asthma, anemia, Corinne Jeronimo tear (9/2023) presents to the ED with 10/10 epigastric pain, hematemesis that started this evening. Patient states he was discharged from [facility] yesterday after removal of cystogastrostomy stent, had min at home and developed abdominal pain. He took an oxy 5 and had 1x episode of hematemesis. He came to [facility] as it is closer to his house. He reports some associated CP and SOB especially with deep breaths or speaking. Has issues with constipation related to his chronic opioid use, his last BM was Monday 12/11. He reports some melanic stools the last 1-2 weeks that he was told would be expected with his recent cystogastrostomy placements and exchanges. He feels they are improving. Upon chart review, he has had intermittent melena the last 6 months.     Of note, the patient has had multiple ED admissions and hospitalizations at [facility] and [facility] for symptoms related to his necrotizing pancreatitis including severe abdominal pain, n/v. He has undergone IR guided aspiration of peripancreatic collections, had cystogastrostomy tubes placed as well as two necrosectomies. He has associated chronic pain that proves difficult to manage.     In the ED, pt afebrile, HDS on RA. CBC with WCC wnl, stable anemia Hb 9.9. CMP unremarkable. Lipase elevated to 136, last noted to be 26 on 12/13 at [facility]. BNP and troponin wnl. CTAP showing slight increase in  peripancreatic collection from 2.9cm x 1.4cm at [facility] on 12/9 to 4.3cm x 1.5cm. Patient received IL NS bolus, IV morphine and dilaudid for pain control and was made NPO. He is being admitted to observation for acute flare of his chronic pancreatitis and pain control.     Overview/Hospital Course:  Mr. Cardona is a 24yo M with a PMHx of necrotizing pancreatis with multiple abdominal procedures, HIV, polycythemia vera c/b splenic vein thrombosis (no anticoagulation), asthma, anemia, Corinne Jeronimo tear (9/2023) admitted for epigastric pain, and hematemesis. On arrival pt afebrile, HDS on RA. WBC 12.5, stable anemia Hb 9.9. CMP unremarkable. Lipase elevated to 136, last noted to be 26 on 12/13 at [facility]. BNP and troponin wnl. CTAP showing slight increase in peripancreatic collection from 2.9cm x 1.4cm at [facility] on 12/9 to 4.3cm x 1.5cm. Patient received IL NS bolus, IV morphine and dilaudid for pain control and was made NPO. AES consulted for evaluation of peripancreatic fluid collection in setting of acute on chronic pancreatitis. Patient started on protonix given hx of hematemesis. Hgb 5.6 on am blood draw, repeat 9.6. AES w/ no need for intervention for stable pancreatic fluid collection. Advancing diet as tolerated, pain control, IVF in setting of pancreatitis.     Interval History: Overnight with episode of severe pain, had PO oxy PRN increased to oxy 15 from 10. Still reporting pain refractory to any medication other than IV dilaudid. Continuing to adjust PO pain management regimen.    Review of Systems   Constitutional:  Negative for chills, fatigue and fever.   HENT:  Negative for sore throat.    Eyes:  Negative for visual disturbance.   Respiratory:  Negative for cough, choking and wheezing.    Cardiovascular:  Negative for chest pain, palpitations and leg swelling.   Gastrointestinal:  Positive for abdominal pain (midepigastric abdominal pain, unchanged from prior). Negative for constipation, diarrhea and vomiting.    Genitourinary:  Negative for dysuria and urgency.   Musculoskeletal:  Negative for myalgias.   Skin:  Negative for rash.   Neurological:  Negative for light-headedness.     Objective:     Vital Signs (Most Recent):  Temp: 99.1 °F (37.3 °C) (12/17/23 1200)  Pulse: 95 (12/17/23 1200)  Resp: 17 (12/17/23 1451)  BP: (!) 134/55 (12/17/23 1200)  SpO2: 98 % (12/17/23 1200) Vital Signs (24h Range):  Temp:  [98.2 °F (36.8 °C)-100 °F (37.8 °C)] 99.1 °F (37.3 °C)  Pulse:  [81-95] 95  Resp:  [16-20] 17  SpO2:  [95 %-98 %] 98 %  BP: (107-134)/(55-70) 134/55     Weight: 65 kg (143 lb 4.8 oz)  Body mass index is 20.56 kg/m².  No intake or output data in the 24 hours ending 12/17/23 1543      Physical Exam  Constitutional:       Appearance: Normal appearance.   HENT:      Head: Normocephalic and atraumatic.      Right Ear: External ear normal.      Left Ear: External ear normal.   Eyes:      Extraocular Movements: Extraocular movements intact.   Cardiovascular:      Rate and Rhythm: Normal rate and regular rhythm.      Pulses: Normal pulses.      Heart sounds: Normal heart sounds.   Pulmonary:      Effort: Pulmonary effort is normal.      Breath sounds: Normal breath sounds.   Abdominal:      General: Abdomen is flat. There is no distension.      Palpations: Abdomen is soft.      Tenderness: There is abdominal tenderness. There is no guarding or rebound.   Musculoskeletal:         General: Normal range of motion.   Skin:     Capillary Refill: Capillary refill takes less than 2 seconds.   Neurological:      General: No focal deficit present.      Mental Status: He is alert and oriented to person, place, and time.             Significant Labs: All pertinent labs within the past 24 hours have been reviewed.  CBC:   Recent Labs   Lab 12/16/23  0512 12/17/23  0441   WBC 8.44 5.95   HGB 8.1* 7.3*   HCT 26.9* 24.3*    249     CMP:   Recent Labs   Lab 12/16/23  0512 12/17/23  0441   * 138   K 3.4* 3.5    106   CO2 23  22*   GLU 82 77   BUN 6 4*   CREATININE 0.6 0.6   CALCIUM 8.6* 8.7   PROT 6.2 6.2   ALBUMIN 2.7* 2.6*   BILITOT 0.7 0.4   ALKPHOS 68 61   AST 10 8*   ALT 5* <5*   ANIONGAP 9 10       Significant Imaging: I have reviewed all pertinent imaging results/findings within the past 24 hours.    Assessment/Plan:      * Acute on chronic pancreatitis  Patient with necrotizing pancreatitis c/b chronic peripancreatic fluid collections, initially diagnosed in September 2023 likely 2/2 chronic pancreatitis from alcohol abuse. Per chart review, last drink in September. Patient has had multiple hospital admissions and ED presentations at [facility] and [facility] for symptoms related to his chronic necrotizing pancreatitis including abdominal pain, n/v and melena. He has required multiple abdominal procedures including EGD, IR guided aspiration of fluid collection, EUS with necrosectomy, cystogastrostomy tubes. He has chronic pain which is difficult to manage. lipase is newly elevated to 126 and has an interval mild increase in size of his fluid collection. He is admitted for further workup of acute flare of chronic pancreatis, AES consult and pain management. AES w/ no intervention for pancreatic fluid collection, advance diet as tolerated. Patient w/ no further vomiting, pain relieved w/ pain meds.    - CLD, advancing as tolerated  - IVF  - Pain control: patient takes oxy 5 and dilaudid 2 at home   - IV dilaudid 1mg q6h   - Oxy 15 q4hr  - Tylenol 1g TID scheduled.  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     Hematemesis  Patient with history of known MW tear noted on EGD 9/2023 presents with nausea and 1x hematemesis. Patient  says he has a small amount of blood in his vomit. He takes pantoprazole 40 BID at home. He also endorses some melena the last couple weeks which he was told would be expected with his cystogastrostomy tubes. Last BM Monday 12/14. No further episodes of hematemesis, tolerating clears    - PO pantoprazole 40 BID  - trend hxh  - Resolved at present    HIV infection  Continue home medications, Biktarvy         VTE Risk Mitigation (From admission, onward)           Ordered     IP VTE HIGH RISK PATIENT  Once         12/15/23 0316     Place sequential compression device  Until discontinued         12/15/23 0316                    Discharge Planning   CASTILLO: 12/20/2023     Code Status: Full Code   Is the patient medically ready for discharge?:     Reason for patient still in hospital (select all that apply): Patient trending condition, Treatment, and Pending disposition                     Cedric Hester MD  Department of Hospital Medicine   [facility] - Intensive Care ([place])

## 2023-12-17 NOTE — ASSESSMENT & PLAN NOTE
Patient with necrotizing pancreatitis c/b chronic peripancreatic fluid collections, initially diagnosed in September 2023 likely 2/2 chronic pancreatitis from alcohol abuse. Per chart review, last drink in September. Patient has had multiple hospital admissions and ED presentations at [facility] and [facility] for symptoms related to his chronic necrotizing pancreatitis including abdominal pain, n/v and melena. He has required multiple abdominal procedures including EGD, IR guided aspiration of fluid collection, EUS with necrosectomy, cystogastrostomy tubes. He has chronic pain which is difficult to manage. lipase is newly elevated to 126 and has an interval mild increase in size of his fluid collection. He is admitted for further workup of acute flare of chronic pancreatis, AES consult and pain management. AES w/ no intervention for pancreatic fluid collection, advance diet as tolerated. Patient w/ no further vomiting, pain relieved w/ pain meds.    - CLD, advancing as tolerated  - IVF  - Pain control: patient takes oxy 5 and dilaudid 2 at home   - IV dilaudid 1mg q6h   - Oxy 15 q4hr  - Tylenol 1g TID scheduled.  - phenergan PRN for nausea

## 2023-12-17 NOTE — PLAN OF CARE
Problem: Adult Inpatient Plan of Care  Goal: Plan of Care Review  Outcome: Ongoing, Progressing     Problem: Adjustment to Illness (Sepsis/Septic Shock)  Goal: Optimal Coping  Outcome: Ongoing, Progressing     Problem: Bleeding (Sepsis/Septic Shock)  Goal: Absence of Bleeding  Outcome: Ongoing, Progressing     Problem: Infection Progression (Sepsis/Septic Shock)  Goal: Absence of Infection Signs and Symptoms  Outcome: Ongoing, Progressing

## 2023-12-18 LAB
ALBUMIN SERPL BCP-MCNC: 2.6 G/DL (ref 3.5–5.2)
ALP SERPL-CCNC: 67 U/L (ref 55–135)
ALT SERPL W/O P-5'-P-CCNC: 5 U/L (ref 10–44)
ANION GAP SERPL CALC-SCNC: 12 MMOL/L (ref 8–16)
AST SERPL-CCNC: 8 U/L (ref 10–40)
BASOPHILS # BLD AUTO: 0.01 K/UL (ref 0–0.2)
BASOPHILS NFR BLD: 0.2 % (ref 0–1.9)
BILIRUB SERPL-MCNC: 0.4 MG/DL (ref 0.1–1)
BUN SERPL-MCNC: 4 MG/DL (ref 6–20)
CALCIUM SERPL-MCNC: 8.8 MG/DL (ref 8.7–10.5)
CHLORIDE SERPL-SCNC: 104 MMOL/L (ref 95–110)
CLINICAL BIOCHEMIST REVIEW: NORMAL
CO2 SERPL-SCNC: 22 MMOL/L (ref 23–29)
CREAT SERPL-MCNC: 0.6 MG/DL (ref 0.5–1.4)
DIFFERENTIAL METHOD: ABNORMAL
EOSINOPHIL # BLD AUTO: 0.2 K/UL (ref 0–0.5)
EOSINOPHIL NFR BLD: 3.3 % (ref 0–8)
ERYTHROCYTE [DISTWIDTH] IN BLOOD BY AUTOMATED COUNT: 17.5 % (ref 11.5–14.5)
EST. GFR  (NO RACE VARIABLE): >60 ML/MIN/1.73 M^2
GLUCOSE SERPL-MCNC: 82 MG/DL (ref 70–110)
HCT VFR BLD AUTO: 26.6 % (ref 40–54)
HGB BLD-MCNC: 8 G/DL (ref 14–18)
IMM GRANULOCYTES # BLD AUTO: 0.01 K/UL (ref 0–0.04)
IMM GRANULOCYTES NFR BLD AUTO: 0.2 % (ref 0–0.5)
LYMPHOCYTES # BLD AUTO: 1.5 K/UL (ref 1–4.8)
LYMPHOCYTES NFR BLD: 27.4 % (ref 18–48)
MCH RBC QN AUTO: 24.5 PG (ref 27–31)
MCHC RBC AUTO-ENTMCNC: 30.1 G/DL (ref 32–36)
MCV RBC AUTO: 81 FL (ref 82–98)
MONOCYTES # BLD AUTO: 0.6 K/UL (ref 0.3–1)
MONOCYTES NFR BLD: 11.1 % (ref 4–15)
NEUTROPHILS # BLD AUTO: 3.2 K/UL (ref 1.8–7.7)
NEUTROPHILS NFR BLD: 57.8 % (ref 38–73)
NRBC BLD-RTO: 0 /100 WBC
PLATELET # BLD AUTO: 279 K/UL (ref 150–450)
PLPETH BLD-MCNC: 87 NG/ML
PMV BLD AUTO: 10.1 FL (ref 9.2–12.9)
POPETH BLD-MCNC: 108 NG/ML
POTASSIUM SERPL-SCNC: 3.6 MMOL/L (ref 3.5–5.1)
PROT SERPL-MCNC: 6.3 G/DL (ref 6–8.4)
RBC # BLD AUTO: 3.27 M/UL (ref 4.6–6.2)
SODIUM SERPL-SCNC: 138 MMOL/L (ref 136–145)
WBC # BLD AUTO: 5.51 K/UL (ref 3.9–12.7)

## 2023-12-18 PROCEDURE — 25000003 PHARM REV CODE 250

## 2023-12-18 PROCEDURE — 36415 COLL VENOUS BLD VENIPUNCTURE: CPT

## 2023-12-18 PROCEDURE — 12000002 HC ACUTE/MED SURGE SEMI-PRIVATE ROOM

## 2023-12-18 PROCEDURE — 80053 COMPREHEN METABOLIC PANEL: CPT

## 2023-12-18 PROCEDURE — 63600175 PHARM REV CODE 636 W HCPCS

## 2023-12-18 PROCEDURE — 25000003 PHARM REV CODE 250: Performed by: INTERNAL MEDICINE

## 2023-12-18 PROCEDURE — 85025 COMPLETE CBC W/AUTO DIFF WBC: CPT

## 2023-12-18 RX ORDER — POTASSIUM CHLORIDE 20 MEQ/1
40 TABLET, EXTENDED RELEASE ORAL ONCE
Status: COMPLETED | OUTPATIENT
Start: 2023-12-18 | End: 2023-12-18

## 2023-12-18 RX ORDER — HYDROMORPHONE HYDROCHLORIDE 1 MG/ML
1 INJECTION, SOLUTION INTRAMUSCULAR; INTRAVENOUS; SUBCUTANEOUS EVERY 8 HOURS PRN
Status: DISCONTINUED | OUTPATIENT
Start: 2023-12-18 | End: 2023-12-19

## 2023-12-18 RX ORDER — MORPHINE SULFATE 15 MG/1
15 TABLET, FILM COATED, EXTENDED RELEASE ORAL EVERY 12 HOURS
Status: DISCONTINUED | OUTPATIENT
Start: 2023-12-18 | End: 2023-12-19

## 2023-12-18 RX ORDER — PROMETHAZINE HYDROCHLORIDE 12.5 MG/1
25 TABLET ORAL EVERY 6 HOURS PRN
Status: DISCONTINUED | OUTPATIENT
Start: 2023-12-18 | End: 2023-12-21 | Stop reason: HOSPADM

## 2023-12-18 RX ORDER — ONDANSETRON 4 MG/1
4 TABLET, ORALLY DISINTEGRATING ORAL EVERY 6 HOURS PRN
Status: DISCONTINUED | OUTPATIENT
Start: 2023-12-18 | End: 2023-12-21 | Stop reason: HOSPADM

## 2023-12-18 RX ORDER — LACTULOSE 10 G/15ML
20 SOLUTION ORAL 2 TIMES DAILY
Status: COMPLETED | OUTPATIENT
Start: 2023-12-18 | End: 2023-12-18

## 2023-12-18 RX ORDER — IBUPROFEN 200 MG
800 TABLET ORAL ONCE
Status: COMPLETED | OUTPATIENT
Start: 2023-12-18 | End: 2023-12-18

## 2023-12-18 RX ADMIN — PANTOPRAZOLE SODIUM 40 MG: 40 TABLET, DELAYED RELEASE ORAL at 07:12

## 2023-12-18 RX ADMIN — LACTULOSE 20 G: 20 SOLUTION ORAL at 08:12

## 2023-12-18 RX ADMIN — Medication 6 MG: at 08:12

## 2023-12-18 RX ADMIN — FOLIC ACID 1 MG: 1 TABLET ORAL at 09:12

## 2023-12-18 RX ADMIN — ACETAMINOPHEN 1000 MG: 500 TABLET ORAL at 03:12

## 2023-12-18 RX ADMIN — OXYCODONE HYDROCHLORIDE 15 MG: 10 TABLET ORAL at 11:12

## 2023-12-18 RX ADMIN — IBUPROFEN 800 MG: 200 TABLET, FILM COATED ORAL at 10:12

## 2023-12-18 RX ADMIN — MORPHINE SULFATE 15 MG: 15 TABLET, EXTENDED RELEASE ORAL at 12:12

## 2023-12-18 RX ADMIN — PROMETHAZINE HYDROCHLORIDE 25 MG: 12.5 TABLET ORAL at 08:12

## 2023-12-18 RX ADMIN — ACETAMINOPHEN 1000 MG: 500 TABLET ORAL at 09:12

## 2023-12-18 RX ADMIN — POTASSIUM CHLORIDE 40 MEQ: 1500 TABLET, EXTENDED RELEASE ORAL at 09:12

## 2023-12-18 RX ADMIN — PANTOPRAZOLE SODIUM 40 MG: 40 TABLET, DELAYED RELEASE ORAL at 03:12

## 2023-12-18 RX ADMIN — ACETAMINOPHEN 1000 MG: 500 TABLET ORAL at 10:12

## 2023-12-18 RX ADMIN — HYDROMORPHONE HYDROCHLORIDE 1 MG: 1 INJECTION, SOLUTION INTRAMUSCULAR; INTRAVENOUS; SUBCUTANEOUS at 05:12

## 2023-12-18 RX ADMIN — POLYETHYLENE GLYCOL 3350 17 G: 17 POWDER, FOR SOLUTION ORAL at 09:12

## 2023-12-18 RX ADMIN — MORPHINE SULFATE 15 MG: 15 TABLET, EXTENDED RELEASE ORAL at 08:12

## 2023-12-18 RX ADMIN — LACTULOSE 20 G: 20 SOLUTION ORAL at 12:12

## 2023-12-18 RX ADMIN — BICTEGRAVIR SODIUM, EMTRICITABINE, AND TENOFOVIR ALAFENAMIDE FUMARATE 1 TABLET: 50; 200; 25 TABLET ORAL at 09:12

## 2023-12-18 RX ADMIN — SENNOSIDES 8.6 MG: 8.6 TABLET, FILM COATED ORAL at 09:12

## 2023-12-18 RX ADMIN — HYDROMORPHONE HYDROCHLORIDE 1 MG: 1 INJECTION, SOLUTION INTRAMUSCULAR; INTRAVENOUS; SUBCUTANEOUS at 02:12

## 2023-12-18 RX ADMIN — OXYCODONE HYDROCHLORIDE 15 MG: 10 TABLET ORAL at 03:12

## 2023-12-18 RX ADMIN — PROMETHAZINE HYDROCHLORIDE 25 MG: 12.5 TABLET ORAL at 09:12

## 2023-12-18 RX ADMIN — OXYCODONE HYDROCHLORIDE 15 MG: 10 TABLET ORAL at 05:12

## 2023-12-18 RX ADMIN — HYDROMORPHONE HYDROCHLORIDE 1 MG: 1 INJECTION, SOLUTION INTRAMUSCULAR; INTRAVENOUS; SUBCUTANEOUS at 09:12

## 2023-12-18 NOTE — PLAN OF CARE
Problem: Adult Inpatient Plan of Care  Goal: Plan of Care Review  Outcome: Ongoing, Progressing     Problem: Pain Acute  Goal: Acceptable Pain Control and Functional Ability  Outcome: Ongoing, Not Progressing     Patient remained in stable condition through shift. Remained free of falls and other injuries. Able to reposition self independently. PRN dilaudid and Oxy given around the clock for pain per request, patient continues to rate pain 8-9/10 despite current pain regimen. PRN phenergan also given overnight for nausea, patient verbalized relief. Patient tolerating regular diet that was advanced. Bed in locked and lowest position, call light in reach, all questions answered, declines any further needs at this time. Frequent monitoring maintained.

## 2023-12-18 NOTE — PLAN OF CARE
[facility] - Int[facility] Care ([place] [address])  Initial Discharge Assessment       Primary Care Provider: Pina Jones NP    Admission Diagnosis: Epigastric pain [R10.13]  Abdominal pain [R10.9]  Chest pain [R07.9]  Currently asymptomatic HIV infection, with history of HIV-related illness [B20]    Admission Date: 12/14/2023  Expected Discharge Date: 12/20/2023    Transition of Care Barriers: (P) Unisured    Payor: MEDICAID / Plan: PENDING MEDICAID / Product Type: Government /     Extended Emergency Contact Information  Primary Emergency Contact: Denice Cardona  Mobile Phone: [phone]  Relation: Mother  Preferred language: English   needed? No    Discharge Plan A: (P) Home  Discharge Plan B: (P) Home      [id number] - [place], [place] - [address] AT [facility] & [address]  [address]  [facility] [address]  Phone: [phone] Fax: [phone]    [facility] [place] - [address] [place]  [address] [facility] [place]  [facility] [address]  Phone: [phone] Fax: [phone]      Initial Assessment (most recent)       Adult Discharge Assessment - 12/18/23 1241          Discharge Assessment    Assessment Type Discharge Planning Assessment (P)      Confirmed/corrected address, phone number and insurance Yes (P)      Confirmed Demographics Correct on Facesheet (P)      Source of Information patient (P)      Communicated CASTILLO with patient/caregiver No (P)      Reason For Admission Acute on chronic pancreatitis (P)      People in Home alone (P)      Facility Arrived From: home (P)      Do you expect to return to your current living situation? Yes (P)      Do you have help at home or someone to help you manage your care at home? No (P)      Prior to hospitilization cognitive status: Unable to Assess (P)      Current cognitive status: Alert/Oriented (P)      Walking or Climbing Stairs Difficulty no (P)      Dressing/Bathing Difficulty no (P)       Home Layout Able to live on 1st floor (P)      Equipment Currently Used at Home none (P)      Readmission within 30 days? Yes (P)      Do you currently have service(s) that help you manage your care at home? No (P)      Do you take prescription medications? No (P)      Do you have prescription coverage? No (P)      Do you have any problems affording any of your prescribed medications? No (P)      Who is going to help you get home at discharge? [employer] (P)      How do you get to doctors appointments? other (see comments) (P)    [employer]    Are you on dialysis? No (P)      Do you take coumadin? No (P)      Discharge Plan A Home (P)      Discharge Plan B Home (P)      DME Needed Upon Discharge  none (P)      Discharge Plan discussed with: Patient (P)      Transition of Care Barriers Unisured (P)         Physical Activity    On average, how many days per week do you engage in moderate to strenuous exercise (like a brisk walk)? 0 days (P)      On average, how many minutes do you engage in exercise at this level? 0 min (P)         Financial Resource Strain    How hard is it for you to pay for the very basics like food, housing, medical care, and heating? Patient declined (P)         Housing Stability    In the last 12 months, was there a time when you were not able to pay the mortgage or rent on time? Patient declined (P)      In the last 12 months, was there a time when you did not have a steady place to sleep or slept in a shelter (including now)? Patient declined (P)         Transportation Needs    In the past 12 months, has lack of transportation kept you from medical appointments or from getting medications? Patient declined (P)      In the past 12 months, has lack of transportation kept you from meetings, work, or from getting things needed for daily living? Patient declined (P)         Food Insecurity    Within the past 12 months, you worried that your food would run out before you got the money to buy more. Patient  declined (P)      Within the past 12 months, the food you bought just didn't last and you didn't have money to get more. Patient declined (P)         Stress    Do you feel stress - tense, restless, nervous, or anxious, or unable to sleep at night because your mind is troubled all the time - these days? Patient declined (P)         Social Connections    In a typical week, how many times do you talk on the phone with family, friends, or neighbors? Patient declined (P)      How often do you get together with friends or relatives? Patient declined (P)      How often do you attend Baptism or Baptist services? Patient declined (P)      Do you belong to any clubs or organizations such as Baptism groups, unions, fraternal or athletic groups, or school groups? Patient declined (P)      How often do you attend meetings of the clubs or organizations you belong to? Patient declined (P)      Are you , , , , never , or living with a partner? Patient declined (P)         Alcohol Use    Q1: How often do you have a drink containing alcohol? Patient declined (P)      Q2: How many drinks containing alcohol do you have on a typical day when you are drinking? Patient declined (P)      Q3: How often do you have six or more drinks on one occasion? Patient declined (P)                    CM spoke with pt in room.  He lives alone in 1 story home, came from home.  He will Uber home and uses Uber to get to MD appts.  30D readmission.  No HH, coumadin, DME or HD.  Indep with ADL's.  He states he is pending Medicaid, [facility] started this process.  MIKIE emailed [facility].    RHONDA MartínezN, BS, RN, CCM

## 2023-12-18 NOTE — TREATMENT PLAN
AES Treatment Plan    Tasia Cardona is a 23 y.o. male admitted to hospital 12/14/2023 (Hospital Day: 5) due to Acute on chronic pancreatitis.     Interval History  No overnight events documented. Pain control remains an on-going issue requiring scheduled use of TYLENOL and IV and PO pain medications. Vital signs remain normal on room air. Labs notable for on-going hypoalbuminemia (2.6), however LFT's remain normal.     Objective  Temp:  [97.8 °F (36.6 °C)-99.8 °F (37.7 °C)] 98.6 °F (37 °C) (12/18 0726)  Pulse:  [75-95] 75 (12/18 0726)  BP: (107-134)/(55-73) 111/68 (12/18 0726)  Resp:  [16-20] 18 (12/18 0900)  SpO2:  [95 %-100 %] 100 % (12/18 0726)    Laboratory  Lab Results   Component Value Date    WBC 5.51 12/18/2023    HGB 8.0 (L) 12/18/2023    HCT 26.6 (L) 12/18/2023    MCV 81 (L) 12/18/2023     12/18/2023       Lab Results   Component Value Date    ALT 5 (L) 12/18/2023    AST 8 (L) 12/18/2023    GGT 61 (H) 10/06/2023    ALKPHOS 67 12/18/2023    BILITOT 0.4 12/18/2023       Assessment  This is a 23 year old male with PMH significant for HIV (CD4 count 1020 10/2023), asthma, polycythemia vera, and pancreatitis (diagnosed in 9/2023 and suspected secondary to ETOH; associated with development of splenic vein thrombus (not on AC) and necrotizing pancreatitis; status post EUS/EGD with necrosectomy in late 11/2023 and early 12/2023 at [facility] with LAMS stent removed on 12/13) who was admitted to [facility] on 12/14 for evaluation of recurrent pancreatitis. CT A/P on admission here with small zehra-pancreatic fluid collections that are too small for endoscopic drainage. He remains in-patient for optimization of pain control, however he is still requiring IV and PO narcotics.     Recommendations    -Agree with current plan to wean opioids as tolerated and maximize multi-modal pain control. Patient would benefit from referral to pain management upon discharge.   -Okay to continue current diet from AES perspective.    -Repeat CT A/P with contrast in 6 weeks to re-evaluate pancreatic fluid collections.   -Optimize nutrition with hypoalbuminemia noted. Consider checking fecal elastase and screening for fat soluble vitamin deficiencies as patient may benefit from initiation of pancreatic enzyme supplementation.     Thank you for involving us in the care of Tasia Cardona. Please call with any additional questions, concerns or changes in the patient's clinical status.        Del Quinones MD, PGY-VI  Gastroenterology Fellow  [facility]

## 2023-12-18 NOTE — PLAN OF CARE
Problem: Adult Inpatient Plan of Care  Goal: Plan of Care Review  Outcome: Ongoing, Not Progressing  Goal: Optimal Comfort and Wellbeing  Outcome: Ongoing, Not Progressing     Problem: Pain Acute  Goal: Acceptable Pain Control and Functional Ability  Outcome: Ongoing, Not Progressing      AAOx4. Continues prn pain management due to  abd discomfort. Upgraded to regular diet and tolerating without difficulty.

## 2023-12-18 NOTE — PLAN OF CARE
Problem: Adult Inpatient Plan of Care  Goal: Plan of Care Review  Outcome: Ongoing, Progressing  Goal: Patient-Specific Goal (Individualized)  Outcome: Ongoing, Progressing  Goal: Readiness for Transition of Care  Outcome: Ongoing, Progressing     Problem: Adjustment to Illness (Sepsis/Septic Shock)  Goal: Optimal Coping  Outcome: Ongoing, Progressing     Problem: Bleeding (Sepsis/Septic Shock)  Goal: Absence of Bleeding  Outcome: Ongoing, Progressing     Problem: Infection Progression (Sepsis/Septic Shock)  Goal: Absence of Infection Signs and Symptoms  Outcome: Ongoing, Progressing     Problem: Pain Acute  Goal: Acceptable Pain Control and Functional Ability  Outcome: Ongoing, Progressing

## 2023-12-18 NOTE — PROGRESS NOTES
[facility] - Intensive Care ([address] Medicine  Progress Note    Patient Name: Tasia Cardona  MRN: [id number]  Patient Class: IP- Inpatient   Admission Date: 12/14/2023  Length of Stay: 3 days  Attending Physician: Genna Samuels MD  Primary Care Provider: Pina Jones NP        Subjective:     Principal Problem:Acute on chronic pancreatitis        HPI:  Mr. Cardona is a 22yo M with a PMHx of necrotizing pancreatis with multiple abdominal procedures, HIV, polycythemia vera c/b splenic vein thrombosis (no anticoagulation), asthma, anemia, Corinne Jeronimo tear (9/2023) presents to the ED with 10/10 epigastric pain, hematemesis that started this evening. Patient states he was discharged from [facility] yesterday after removal of cystogastrostomy stent, had min at home and developed abdominal pain. He took an oxy 5 and had 1x episode of hematemesis. He came to [facility] as it is closer to his house. He reports some associated CP and SOB especially with deep breaths or speaking. Has issues with constipation related to his chronic opioid use, his last BM was Monday 12/11. He reports some melanic stools the last 1-2 weeks that he was told would be expected with his recent cystogastrostomy placements and exchanges. He feels they are improving. Upon chart review, he has had intermittent melena the last 6 months.     Of note, the patient has had multiple ED admissions and hospitalizations at [facility] and [facility] for symptoms related to his necrotizing pancreatitis including severe abdominal pain, n/v. He has undergone IR guided aspiration of peripancreatic collections, had cystogastrostomy tubes placed as well as two necrosectomies. He has associated chronic pain that proves difficult to manage.     In the ED, pt afebrile, HDS on RA. CBC with WCC wnl, stable anemia Hb 9.9. CMP unremarkable. Lipase elevated to 136, last noted to be 26 on 12/13 at [facility]. BNP and troponin wnl. CTAP showing slight increase in  peripancreatic collection from 2.9cm x 1.4cm at [facility] on 12/9 to 4.3cm x 1.5cm. Patient received IL NS bolus, IV morphine and dilaudid for pain control and was made NPO. He is being admitted to observation for acute flare of his chronic pancreatitis and pain control.     Overview/Hospital Course:  Mr. Cardona is a 24yo M with a PMHx of necrotizing pancreatis with multiple abdominal procedures, HIV, polycythemia vera c/b splenic vein thrombosis (no anticoagulation), asthma, anemia, Corinne Jeronimo tear (9/2023) admitted for epigastric pain, and hematemesis. On arrival pt afebrile, HDS on RA. WBC 12.5, stable anemia Hb 9.9. CMP unremarkable. Lipase elevated to 136, last noted to be 26 on 12/13 at [facility]. BNP and troponin wnl. CTAP showing slight increase in peripancreatic collection from 2.9cm x 1.4cm at [facility] on 12/9 to 4.3cm x 1.5cm. Patient received IL NS bolus, IV morphine and dilaudid for pain control and was made NPO. AES consulted for evaluation of peripancreatic fluid collection in setting of acute on chronic pancreatitis. Patient started on protonix given hx of hematemesis. Hgb 5.6 on am blood draw, repeat 9.6. AES w/ no need for intervention for stable pancreatic fluid collection. Advancing diet as tolerated, pain control, IVF in setting of pancreatitis. Weaning IV pain medications as able. Patient able to tolerate regular diet without issue, beginning on 12/18 AM.     Interval History: Patient resting comfortably in bed this AM, watching television after eating regular diet. Reports continued abdominal pain. Also reports no bowel movement in 5-6 days.    Review of Systems   Constitutional:  Negative for chills, fatigue and fever.   HENT:  Negative for sore throat.    Eyes:  Negative for visual disturbance.   Respiratory:  Negative for cough, choking and wheezing.    Cardiovascular:  Negative for chest pain, palpitations and leg swelling.   Gastrointestinal:  Positive for abdominal pain (midepigastric abdominal pain,  unchanged from prior). Negative for constipation, diarrhea and vomiting.   Genitourinary:  Negative for dysuria and urgency.   Musculoskeletal:  Negative for myalgias.   Skin:  Negative for rash.   Neurological:  Negative for light-headedness.     Objective:     Vital Signs (Most Recent):  Temp: 98.6 °F (37 °C) (12/18/23 1122)  Pulse: 86 (12/18/23 1122)  Resp: 18 (12/18/23 1214)  BP: 115/67 (12/18/23 1122)  SpO2: 100 % (12/18/23 1122) Vital Signs (24h Range):  Temp:  [97.8 °F (36.6 °C)-99.8 °F (37.7 °C)] 98.6 °F (37 °C)  Pulse:  [75-88] 86  Resp:  [17-20] 18  SpO2:  [95 %-100 %] 100 %  BP: (107-122)/(66-73) 115/67     Weight: 65 kg (143 lb 4.8 oz)  Body mass index is 20.56 kg/m².    Intake/Output Summary (Last 24 hours) at 12/18/2023 1451  Last data filed at 12/17/2023 2237  Gross per 24 hour   Intake 200 ml   Output --   Net 200 ml         Physical Exam  Constitutional:       Appearance: Normal appearance.   HENT:      Head: Normocephalic and atraumatic.      Right Ear: External ear normal.      Left Ear: External ear normal.   Eyes:      Extraocular Movements: Extraocular movements intact.   Cardiovascular:      Rate and Rhythm: Normal rate and regular rhythm.      Pulses: Normal pulses.      Heart sounds: Normal heart sounds.   Pulmonary:      Effort: Pulmonary effort is normal.      Breath sounds: Normal breath sounds.   Abdominal:      General: Abdomen is flat. Bowel sounds are normal. There is no distension.      Palpations: Abdomen is soft.      Tenderness: There is abdominal tenderness. There is no left CVA tenderness, guarding (voluntary) or rebound.   Musculoskeletal:         General: Normal range of motion.   Skin:     Capillary Refill: Capillary refill takes less than 2 seconds.   Neurological:      General: No focal deficit present.      Mental Status: He is alert and oriented to person, place, and time. Mental status is at baseline.             Significant Labs: All pertinent labs within the past 24  hours have been reviewed.  CBC:   Recent Labs   Lab 12/17/23  0441 12/18/23  0238   WBC 5.95 5.51   HGB 7.3* 8.0*   HCT 24.3* 26.6*    279       CMP:   Recent Labs   Lab 12/17/23  0441 12/18/23  0238    138   K 3.5 3.6    104   CO2 22* 22*   GLU 77 82   BUN 4* 4*   CREATININE 0.6 0.6   CALCIUM 8.7 8.8   PROT 6.2 6.3   ALBUMIN 2.6* 2.6*   BILITOT 0.4 0.4   ALKPHOS 61 67   AST 8* 8*   ALT <5* 5*   ANIONGAP 10 12         Significant Imaging: I have reviewed all pertinent imaging results/findings within the past 24 hours.    Assessment/Plan:      * Acute on chronic pancreatitis  Patient with necrotizing pancreatitis c/b chronic peripancreatic fluid collections, initially diagnosed in September 2023 likely 2/2 chronic pancreatitis from alcohol abuse. Per chart review, last drink in September. Patient has had multiple hospital admissions and ED presentations at [facility] and [facility] for symptoms related to his chronic necrotizing pancreatitis including abdominal pain, n/v and melena. He has required multiple abdominal procedures including EGD, IR guided aspiration of fluid collection, EUS with necrosectomy, cystogastrostomy tubes. He has chronic pain which is difficult to manage. lipase is newly elevated to 126 and has an interval mild increase in size of his fluid collection. He is admitted for further workup of acute flare of chronic pancreatis, AES consult and pain management. AES w/ no intervention for pancreatic fluid collection, advance diet as tolerated. Patient w/ no further vomiting, pain relieved w/ pain meds.    - CLD, advancing as tolerated  - IVF  - Pain control: patient takes oxy 5 and dilaudid 2 at home   - IV dilaudid 1mg q8H with plan to continue spacing intervals   - Oxy 15 q4hr   - add MS contin 15 mg QD  - Tylenol 1g TID scheduled.  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     Hematemesis  Patient with history of known MW tear noted on EGD 9/2023 presents with nausea and 1x hematemesis. Patient says he has a small amount of blood in his vomit. He takes pantoprazole 40 BID at home. He also endorses some melena the last couple weeks which he was told would be expected with his cystogastrostomy tubes. Last BM Monday 12/14. No further episodes of hematemesis, tolerating clears    - PO pantoprazole 40 BID  - trend hxh  - Resolved at present    HIV infection  Continue home medications, Biktarvy         VTE Risk Mitigation (From admission, onward)           Ordered     IP VTE HIGH RISK PATIENT  Once         12/15/23 0316     Place sequential compression device  Until discontinued         12/15/23 0316                    Discharge Planning   CASTILLO: 12/20/2023     Code Status: Full Code   Is the patient medically ready for discharge?:     Reason for patient still in hospital (select all that apply): Patient trending condition  Discharge Plan A: Home          Noah Trinh MD  Department of Hospital Medicine   [facility] [address] [facility] - Intensive Care ([place]-[address])

## 2023-12-18 NOTE — ASSESSMENT & PLAN NOTE
Patient with necrotizing pancreatitis c/b chronic peripancreatic fluid collections, initially diagnosed in September 2023 likely 2/2 chronic pancreatitis from alcohol abuse. Per chart review, last drink in September. Patient has had multiple hospital admissions and ED presentations at [facility] and [facility] for symptoms related to his chronic necrotizing pancreatitis including abdominal pain, n/v and melena. He has required multiple abdominal procedures including EGD, IR guided aspiration of fluid collection, EUS with necrosectomy, cystogastrostomy tubes. He has chronic pain which is difficult to manage. lipase is newly elevated to 126 and has an interval mild increase in size of his fluid collection. He is admitted for further workup of acute flare of chronic pancreatis, AES consult and pain management. AES w/ no intervention for pancreatic fluid collection, advance diet as tolerated. Patient w/ no further vomiting, pain relieved w/ pain meds.    - CLD, advancing as tolerated  - IVF  - Pain control: patient takes oxy 5 and dilaudid 2 at home   - IV dilaudid 1mg q8H with plan to continue spacing intervals   - Oxy 15 q4hr   - add MS contin 15 mg QD  - Tylenol 1g TID scheduled.  - phenergan PRN for nausea

## 2023-12-18 NOTE — SUBJECTIVE & OBJECTIVE
Interval History: Patient resting comfortably in bed this AM, watching television after eating regular diet. Reports continued abdominal pain. Also reports no bowel movement in 5-6 days.    Review of Systems   Constitutional:  Negative for chills, fatigue and fever.   HENT:  Negative for sore throat.    Eyes:  Negative for visual disturbance.   Respiratory:  Negative for cough, choking and wheezing.    Cardiovascular:  Negative for chest pain, palpitations and leg swelling.   Gastrointestinal:  Positive for abdominal pain (midepigastric abdominal pain, unchanged from prior). Negative for constipation, diarrhea and vomiting.   Genitourinary:  Negative for dysuria and urgency.   Musculoskeletal:  Negative for myalgias.   Skin:  Negative for rash.   Neurological:  Negative for light-headedness.     Objective:     Vital Signs (Most Recent):  Temp: 98.6 °F (37 °C) (12/18/23 1122)  Pulse: 86 (12/18/23 1122)  Resp: 18 (12/18/23 1214)  BP: 115/67 (12/18/23 1122)  SpO2: 100 % (12/18/23 1122) Vital Signs (24h Range):  Temp:  [97.8 °F (36.6 °C)-99.8 °F (37.7 °C)] 98.6 °F (37 °C)  Pulse:  [75-88] 86  Resp:  [17-20] 18  SpO2:  [95 %-100 %] 100 %  BP: (107-122)/(66-73) 115/67     Weight: 65 kg (143 lb 4.8 oz)  Body mass index is 20.56 kg/m².    Intake/Output Summary (Last 24 hours) at 12/18/2023 1451  Last data filed at 12/17/2023 2237  Gross per 24 hour   Intake 200 ml   Output --   Net 200 ml         Physical Exam  Constitutional:       Appearance: Normal appearance.   HENT:      Head: Normocephalic and atraumatic.      Right Ear: External ear normal.      Left Ear: External ear normal.   Eyes:      Extraocular Movements: Extraocular movements intact.   Cardiovascular:      Rate and Rhythm: Normal rate and regular rhythm.      Pulses: Normal pulses.      Heart sounds: Normal heart sounds.   Pulmonary:      Effort: Pulmonary effort is normal.      Breath sounds: Normal breath sounds.   Abdominal:      General: Abdomen is flat.  Bowel sounds are normal. There is no distension.      Palpations: Abdomen is soft.      Tenderness: There is abdominal tenderness. There is no left CVA tenderness, guarding (voluntary) or rebound.   Musculoskeletal:         General: Normal range of motion.   Skin:     Capillary Refill: Capillary refill takes less than 2 seconds.   Neurological:      General: No focal deficit present.      Mental Status: He is alert and oriented to person, place, and time. Mental status is at baseline.             Significant Labs: All pertinent labs within the past 24 hours have been reviewed.  CBC:   Recent Labs   Lab 12/17/23 0441 12/18/23  0238   WBC 5.95 5.51   HGB 7.3* 8.0*   HCT 24.3* 26.6*    279       CMP:   Recent Labs   Lab 12/17/23 0441 12/18/23 0238    138   K 3.5 3.6    104   CO2 22* 22*   GLU 77 82   BUN 4* 4*   CREATININE 0.6 0.6   CALCIUM 8.7 8.8   PROT 6.2 6.3   ALBUMIN 2.6* 2.6*   BILITOT 0.4 0.4   ALKPHOS 61 67   AST 8* 8*   ALT <5* 5*   ANIONGAP 10 12         Significant Imaging: I have reviewed all pertinent imaging results/findings within the past 24 hours.

## 2023-12-19 LAB
ABO + RH BLD: NORMAL
ALBUMIN SERPL BCP-MCNC: 2.7 G/DL (ref 3.5–5.2)
ALP SERPL-CCNC: 67 U/L (ref 55–135)
ALT SERPL W/O P-5'-P-CCNC: <5 U/L (ref 10–44)
ANION GAP SERPL CALC-SCNC: 10 MMOL/L (ref 8–16)
AST SERPL-CCNC: 9 U/L (ref 10–40)
BACTERIA BLD CULT: NORMAL
BACTERIA BLD CULT: NORMAL
BASOPHILS # BLD AUTO: 0.02 K/UL (ref 0–0.2)
BASOPHILS NFR BLD: 0.5 % (ref 0–1.9)
BILIRUB SERPL-MCNC: 0.3 MG/DL (ref 0.1–1)
BLD GP AB SCN CELLS X3 SERPL QL: NORMAL
BUN SERPL-MCNC: 5 MG/DL (ref 6–20)
CALCIUM SERPL-MCNC: 9.1 MG/DL (ref 8.7–10.5)
CHLORIDE SERPL-SCNC: 105 MMOL/L (ref 95–110)
CO2 SERPL-SCNC: 26 MMOL/L (ref 23–29)
CREAT SERPL-MCNC: 0.7 MG/DL (ref 0.5–1.4)
DIFFERENTIAL METHOD: ABNORMAL
EOSINOPHIL # BLD AUTO: 0.1 K/UL (ref 0–0.5)
EOSINOPHIL NFR BLD: 2.8 % (ref 0–8)
ERYTHROCYTE [DISTWIDTH] IN BLOOD BY AUTOMATED COUNT: 17.7 % (ref 11.5–14.5)
EST. GFR  (NO RACE VARIABLE): >60 ML/MIN/1.73 M^2
GLUCOSE SERPL-MCNC: 99 MG/DL (ref 70–110)
HCT VFR BLD AUTO: 26 % (ref 40–54)
HGB BLD-MCNC: 8 G/DL (ref 14–18)
IMM GRANULOCYTES # BLD AUTO: 0.01 K/UL (ref 0–0.04)
IMM GRANULOCYTES NFR BLD AUTO: 0.2 % (ref 0–0.5)
LYMPHOCYTES # BLD AUTO: 1.6 K/UL (ref 1–4.8)
LYMPHOCYTES NFR BLD: 36.4 % (ref 18–48)
MCH RBC QN AUTO: 24.1 PG (ref 27–31)
MCHC RBC AUTO-ENTMCNC: 30.8 G/DL (ref 32–36)
MCV RBC AUTO: 78 FL (ref 82–98)
MONOCYTES # BLD AUTO: 0.5 K/UL (ref 0.3–1)
MONOCYTES NFR BLD: 11.3 % (ref 4–15)
NEUTROPHILS # BLD AUTO: 2.1 K/UL (ref 1.8–7.7)
NEUTROPHILS NFR BLD: 48.8 % (ref 38–73)
NRBC BLD-RTO: 0 /100 WBC
PLATELET # BLD AUTO: 264 K/UL (ref 150–450)
PMV BLD AUTO: 9.9 FL (ref 9.2–12.9)
POTASSIUM SERPL-SCNC: 3.6 MMOL/L (ref 3.5–5.1)
PROT SERPL-MCNC: 6.6 G/DL (ref 6–8.4)
RBC # BLD AUTO: 3.32 M/UL (ref 4.6–6.2)
SODIUM SERPL-SCNC: 141 MMOL/L (ref 136–145)
SPECIMEN OUTDATE: NORMAL
WBC # BLD AUTO: 4.34 K/UL (ref 3.9–12.7)

## 2023-12-19 PROCEDURE — 25000003 PHARM REV CODE 250: Performed by: INTERNAL MEDICINE

## 2023-12-19 PROCEDURE — 12000002 HC ACUTE/MED SURGE SEMI-PRIVATE ROOM

## 2023-12-19 PROCEDURE — 25000003 PHARM REV CODE 250

## 2023-12-19 PROCEDURE — 85025 COMPLETE CBC W/AUTO DIFF WBC: CPT

## 2023-12-19 PROCEDURE — 86850 RBC ANTIBODY SCREEN: CPT

## 2023-12-19 PROCEDURE — 80053 COMPREHEN METABOLIC PANEL: CPT

## 2023-12-19 PROCEDURE — 63600175 PHARM REV CODE 636 W HCPCS

## 2023-12-19 PROCEDURE — 36415 COLL VENOUS BLD VENIPUNCTURE: CPT

## 2023-12-19 RX ORDER — MORPHINE SULFATE 30 MG/1
30 TABLET, FILM COATED, EXTENDED RELEASE ORAL EVERY 12 HOURS
Status: DISCONTINUED | OUTPATIENT
Start: 2023-12-19 | End: 2023-12-21 | Stop reason: HOSPADM

## 2023-12-19 RX ORDER — MUPIROCIN 20 MG/G
OINTMENT TOPICAL 2 TIMES DAILY
Status: DISCONTINUED | OUTPATIENT
Start: 2023-12-19 | End: 2023-12-21 | Stop reason: HOSPADM

## 2023-12-19 RX ORDER — MORPHINE SULFATE 15 MG/1
15 TABLET, FILM COATED, EXTENDED RELEASE ORAL ONCE
Status: COMPLETED | OUTPATIENT
Start: 2023-12-19 | End: 2023-12-19

## 2023-12-19 RX ORDER — LACTULOSE 10 G/15ML
20 SOLUTION ORAL EVERY 4 HOURS
Status: DISCONTINUED | OUTPATIENT
Start: 2023-12-19 | End: 2023-12-19

## 2023-12-19 RX ORDER — POTASSIUM CHLORIDE 20 MEQ/1
40 TABLET, EXTENDED RELEASE ORAL ONCE
Status: COMPLETED | OUTPATIENT
Start: 2023-12-19 | End: 2023-12-19

## 2023-12-19 RX ORDER — LACTULOSE 10 G/15ML
30 SOLUTION ORAL EVERY 4 HOURS
Status: DISPENSED | OUTPATIENT
Start: 2023-12-19 | End: 2023-12-20

## 2023-12-19 RX ORDER — AMOXICILLIN 250 MG
1 CAPSULE ORAL 2 TIMES DAILY
Status: DISCONTINUED | OUTPATIENT
Start: 2023-12-19 | End: 2023-12-21 | Stop reason: HOSPADM

## 2023-12-19 RX ORDER — HYDROMORPHONE HYDROCHLORIDE 1 MG/ML
0.5 INJECTION, SOLUTION INTRAMUSCULAR; INTRAVENOUS; SUBCUTANEOUS EVERY 8 HOURS PRN
Status: DISCONTINUED | OUTPATIENT
Start: 2023-12-19 | End: 2023-12-20

## 2023-12-19 RX ADMIN — PROMETHAZINE HYDROCHLORIDE 25 MG: 12.5 TABLET ORAL at 09:12

## 2023-12-19 RX ADMIN — MORPHINE SULFATE 30 MG: 30 TABLET, FILM COATED, EXTENDED RELEASE ORAL at 09:12

## 2023-12-19 RX ADMIN — POTASSIUM CHLORIDE 40 MEQ: 1500 TABLET, EXTENDED RELEASE ORAL at 09:12

## 2023-12-19 RX ADMIN — PANTOPRAZOLE SODIUM 40 MG: 40 TABLET, DELAYED RELEASE ORAL at 05:12

## 2023-12-19 RX ADMIN — HYDROMORPHONE HYDROCHLORIDE 0.5 MG: 0.5 INJECTION, SOLUTION INTRAMUSCULAR; INTRAVENOUS; SUBCUTANEOUS at 05:12

## 2023-12-19 RX ADMIN — MORPHINE SULFATE 15 MG: 15 TABLET, EXTENDED RELEASE ORAL at 11:12

## 2023-12-19 RX ADMIN — FOLIC ACID 1 MG: 1 TABLET ORAL at 09:12

## 2023-12-19 RX ADMIN — PANCRELIPASE 1 CAPSULE: 60000; 12000; 38000 CAPSULE, DELAYED RELEASE PELLETS ORAL at 11:12

## 2023-12-19 RX ADMIN — Medication 6 MG: at 09:12

## 2023-12-19 RX ADMIN — ACETAMINOPHEN 1000 MG: 500 TABLET ORAL at 09:12

## 2023-12-19 RX ADMIN — LACTULOSE 30 G: 20 SOLUTION ORAL at 09:12

## 2023-12-19 RX ADMIN — MORPHINE SULFATE 15 MG: 15 TABLET, EXTENDED RELEASE ORAL at 09:12

## 2023-12-19 RX ADMIN — HYDROMORPHONE HYDROCHLORIDE 1 MG: 1 INJECTION, SOLUTION INTRAMUSCULAR; INTRAVENOUS; SUBCUTANEOUS at 01:12

## 2023-12-19 RX ADMIN — BICTEGRAVIR SODIUM, EMTRICITABINE, AND TENOFOVIR ALAFENAMIDE FUMARATE 1 TABLET: 50; 200; 25 TABLET ORAL at 09:12

## 2023-12-19 RX ADMIN — LACTULOSE 30 G: 20 SOLUTION ORAL at 02:12

## 2023-12-19 RX ADMIN — LACTULOSE 30 G: 20 SOLUTION ORAL at 11:12

## 2023-12-19 RX ADMIN — SENNOSIDES AND DOCUSATE SODIUM 1 TABLET: 8.6; 5 TABLET ORAL at 09:12

## 2023-12-19 RX ADMIN — OXYCODONE HYDROCHLORIDE 15 MG: 10 TABLET ORAL at 02:12

## 2023-12-19 RX ADMIN — OXYCODONE HYDROCHLORIDE 15 MG: 10 TABLET ORAL at 07:12

## 2023-12-19 RX ADMIN — OXYCODONE HYDROCHLORIDE 15 MG: 10 TABLET ORAL at 05:12

## 2023-12-19 RX ADMIN — HYDROMORPHONE HYDROCHLORIDE 1 MG: 1 INJECTION, SOLUTION INTRAMUSCULAR; INTRAVENOUS; SUBCUTANEOUS at 09:12

## 2023-12-19 NOTE — PLAN OF CARE
Problem: Adult Inpatient Plan of Care  Goal: Absence of Hospital-Acquired Illness or Injury  Outcome: Ongoing, Progressing  Goal: Optimal Comfort and Wellbeing  Outcome: Ongoing, Progressing  Goal: Readiness for Transition of Care  Outcome: Ongoing, Progressing     Problem: Adjustment to Illness (Sepsis/Septic Shock)  Goal: Optimal Coping  Outcome: Ongoing, Progressing     Problem: Bleeding (Sepsis/Septic Shock)  Goal: Absence of Bleeding  Outcome: Ongoing, Progressing     Problem: Nutrition Impaired (Sepsis/Septic Shock)  Goal: Optimal Nutrition Intake  Outcome: Ongoing, Progressing     Problem: Pain Acute  Goal: Acceptable Pain Control and Functional Ability  Outcome: Ongoing, Progressing

## 2023-12-19 NOTE — ASSESSMENT & PLAN NOTE
Patient with history of known MW tear noted on EGD 9/2023 presents with nausea and 1x hematemesis. Patient says he has a small amount of blood in his vomit. He takes pantoprazole 40 BID at home. He also endorses some melena the last couple weeks which he was told would be expected with his cystogastrostomy tubes.     - PO pantoprazole 40 BID  - trend hxh  - Resolved at present

## 2023-12-19 NOTE — SUBJECTIVE & OBJECTIVE
Interval History: Patient resting comfortably in bed this AM, watching television after eating full breakfast. Reports one episode of vomiting last evening with some bilious fluid but no blood. Remains without bowel movement at this time, though intermittently passing flatus.     Review of Systems   Constitutional:  Negative for chills, fatigue and fever.   HENT:  Negative for sore throat.    Eyes:  Negative for visual disturbance.   Respiratory:  Negative for cough, choking and wheezing.    Cardiovascular:  Negative for chest pain, palpitations and leg swelling.   Gastrointestinal:  Positive for abdominal pain (midepigastric abdominal pain, unchanged from prior). Negative for constipation, diarrhea and vomiting.   Genitourinary:  Negative for dysuria and urgency.   Musculoskeletal:  Negative for myalgias.   Skin:  Negative for rash.   Neurological:  Negative for light-headedness.     Objective:     Vital Signs (Most Recent):  Temp: 97.4 °F (36.3 °C) (12/19/23 1151)  Pulse: (!) 58 (12/19/23 1151)  Resp: 18 (12/19/23 1432)  BP: 110/69 (12/19/23 1433)  SpO2: 100 % (12/19/23 1151) Vital Signs (24h Range):  Temp:  [97.2 °F (36.2 °C)-98.6 °F (37 °C)] 97.4 °F (36.3 °C)  Pulse:  [56-84] 58  Resp:  [17-20] 18  SpO2:  [96 %-100 %] 100 %  BP: ()/(59-70) 110/69     Weight: 65 kg (143 lb 4.8 oz)  Body mass index is 20.56 kg/m².    Intake/Output Summary (Last 24 hours) at 12/19/2023 1512  Last data filed at 12/18/2023 2019  Gross per 24 hour   Intake 120 ml   Output --   Net 120 ml           Physical Exam  Constitutional:       Appearance: Normal appearance.   HENT:      Head: Normocephalic and atraumatic.      Right Ear: External ear normal.      Left Ear: External ear normal.   Eyes:      Extraocular Movements: Extraocular movements intact.   Cardiovascular:      Rate and Rhythm: Normal rate and regular rhythm.      Pulses: Normal pulses.      Heart sounds: Normal heart sounds.   Pulmonary:      Effort: Pulmonary effort  is normal.      Breath sounds: Normal breath sounds.   Abdominal:      General: Abdomen is flat. Bowel sounds are normal. There is no distension.      Palpations: Abdomen is soft.      Tenderness: There is abdominal tenderness. There is no left CVA tenderness, guarding (voluntary) or rebound.   Musculoskeletal:         General: Normal range of motion.   Skin:     Capillary Refill: Capillary refill takes less than 2 seconds.   Neurological:      General: No focal deficit present.      Mental Status: He is alert and oriented to person, place, and time. Mental status is at baseline.             Significant Labs: All pertinent labs within the past 24 hours have been reviewed.  CBC:   Recent Labs   Lab 12/18/23  0238 12/19/23  0511   WBC 5.51 4.34   HGB 8.0* 8.0*   HCT 26.6* 26.0*    264       CMP:   Recent Labs   Lab 12/18/23  0238 12/19/23  0511    141   K 3.6 3.6    105   CO2 22* 26   GLU 82 99   BUN 4* 5*   CREATININE 0.6 0.7   CALCIUM 8.8 9.1   PROT 6.3 6.6   ALBUMIN 2.6* 2.7*   BILITOT 0.4 0.3   ALKPHOS 67 67   AST 8* 9*   ALT 5* <5*   ANIONGAP 12 10         Significant Imaging: I have reviewed all pertinent imaging results/findings within the past 24 hours.

## 2023-12-19 NOTE — ASSESSMENT & PLAN NOTE
Patient with necrotizing pancreatitis c/b chronic peripancreatic fluid collections, initially diagnosed in September 2023 likely 2/2 chronic pancreatitis from alcohol abuse. Per chart review, last drink in September. Patient has had multiple hospital admissions and ED presentations at [facility] and [facility] for symptoms related to his chronic necrotizing pancreatitis including abdominal pain, n/v and melena. He has required multiple abdominal procedures including EGD, IR guided aspiration of fluid collection, EUS with necrosectomy, cystogastrostomy tubes. He has chronic pain which is difficult to manage. lipase is newly elevated to 126 and has an interval mild increase in size of his fluid collection. He is admitted for further workup of acute flare of chronic pancreatis, AES consult and pain management. AES w/ no intervention for pancreatic fluid collection, advance diet as tolerated. Patient w/ no further vomiting, pain relieved w/ pain meds.    - Regular diet  - Pain control: patient takes oxy 5 and dilaudid 2 at home   - IV dilaudid 0.5mg q8H with plan to continue wean   - Oxy 15 q4hr   - increase MS contin to 30mg BID  - Tylenol 1g TID scheduled.  - phenergan PRN for nausea

## 2023-12-19 NOTE — PLAN OF CARE
Problem: Adult Inpatient Plan of Care  Goal: Plan of Care Review  Outcome: Ongoing, Progressing  Goal: Patient-Specific Goal (Individualized)  Outcome: Ongoing, Progressing  Goal: Absence of Hospital-Acquired Illness or Injury  Outcome: Ongoing, Progressing  Goal: Optimal Comfort and Wellbeing  Outcome: Ongoing, Progressing     Problem: Pain Acute  Goal: Acceptable Pain Control and Functional Ability  Outcome: Ongoing, Progressing  Intervention: Develop Pain Management Plan  Flowsheets (Taken 12/18/2023 2213)  Pain Management Interventions:   around-the-clock dosing utilized   pain management plan reviewed with patient/caregiver

## 2023-12-19 NOTE — PROGRESS NOTES
[facility] - Intensive Care ([address] Medicine  Progress Note    Patient Name: Tasia Cardona  MRN: [id number]  Patient Class: IP- Inpatient   Admission Date: 12/14/2023  Length of Stay: 4 days  Attending Physician: Genna Samuels MD  Primary Care Provider: Pina Jones NP        Subjective:     Principal Problem:Acute on chronic pancreatitis        HPI:  Mr. Cardona is a 24yo M with a PMHx of necrotizing pancreatis with multiple abdominal procedures, HIV, polycythemia vera c/b splenic vein thrombosis (no anticoagulation), asthma, anemia, Corinne Jeronimo tear (9/2023) presents to the ED with 10/10 epigastric pain, hematemesis that started this evening. Patient states he was discharged from [facility] yesterday after removal of cystogastrostomy stent, had min at home and developed abdominal pain. He took an oxy 5 and had 1x episode of hematemesis. He came to [facility] as it is closer to his house. He reports some associated CP and SOB especially with deep breaths or speaking. Has issues with constipation related to his chronic opioid use, his last BM was Monday 12/11. He reports some melanic stools the last 1-2 weeks that he was told would be expected with his recent cystogastrostomy placements and exchanges. He feels they are improving. Upon chart review, he has had intermittent melena the last 6 months.     Of note, the patient has had multiple ED admissions and hospitalizations at [facility] and [facility] for symptoms related to his necrotizing pancreatitis including severe abdominal pain, n/v. He has undergone IR guided aspiration of peripancreatic collections, had cystogastrostomy tubes placed as well as two necrosectomies. He has associated chronic pain that proves difficult to manage.     In the ED, pt afebrile, HDS on RA. CBC with WCC wnl, stable anemia Hb 9.9. CMP unremarkable. Lipase elevated to 136, last noted to be 26 on 12/13 at [facility]. BNP and troponin wnl. CTAP showing slight increase in  peripancreatic collection from 2.9cm x 1.4cm at [facility] on 12/9 to 4.3cm x 1.5cm. Patient received IL NS bolus, IV morphine and dilaudid for pain control and was made NPO. He is being admitted to observation for acute flare of his chronic pancreatitis and pain control.     Overview/Hospital Course:  Mr. Cardona is a 22yo M with a PMHx of necrotizing pancreatis with multiple abdominal procedures, HIV, polycythemia vera c/b splenic vein thrombosis (no anticoagulation), asthma, anemia, Corinne Jeronimo tear (9/2023) admitted for epigastric pain, and hematemesis. On arrival pt afebrile, HDS on RA. WBC 12.5, stable anemia Hb 9.9. CMP unremarkable. Lipase elevated to 136, last noted to be 26 on 12/13 at [facility]. BNP and troponin wnl. CTAP showing slight increase in peripancreatic collection from 2.9cm x 1.4cm at [facility] on 12/9 to 4.3cm x 1.5cm. Patient received IL NS bolus, IV morphine and dilaudid for pain control and was made NPO. AES consulted for evaluation of peripancreatic fluid collection in setting of acute on chronic pancreatitis. Patient started on protonix given hx of hematemesis. Hgb 5.6 on am blood draw, repeat 9.6. AES w/ no need for intervention for stable pancreatic fluid collection. Advancing diet as tolerated, pain control, IVF in setting of pancreatitis. Weaning IV pain medications as able. Patient able to tolerate regular diet without issue, beginning on 12/18 AM.     Interval History: Patient resting comfortably in bed this AM, watching television after eating full breakfast. Reports one episode of vomiting last evening with some bilious fluid but no blood. Remains without bowel movement at this time, though intermittently passing flatus.     Review of Systems   Constitutional:  Negative for chills, fatigue and fever.   HENT:  Negative for sore throat.    Eyes:  Negative for visual disturbance.   Respiratory:  Negative for cough, choking and wheezing.    Cardiovascular:  Negative for chest pain, palpitations and leg  swelling.   Gastrointestinal:  Positive for abdominal pain (midepigastric abdominal pain, unchanged from prior). Negative for constipation, diarrhea and vomiting.   Genitourinary:  Negative for dysuria and urgency.   Musculoskeletal:  Negative for myalgias.   Skin:  Negative for rash.   Neurological:  Negative for light-headedness.     Objective:     Vital Signs (Most Recent):  Temp: 97.4 °F (36.3 °C) (12/19/23 1151)  Pulse: (!) 58 (12/19/23 1151)  Resp: 18 (12/19/23 1432)  BP: 110/69 (12/19/23 1433)  SpO2: 100 % (12/19/23 1151) Vital Signs (24h Range):  Temp:  [97.2 °F (36.2 °C)-98.6 °F (37 °C)] 97.4 °F (36.3 °C)  Pulse:  [56-84] 58  Resp:  [17-20] 18  SpO2:  [96 %-100 %] 100 %  BP: ()/(59-70) 110/69     Weight: 65 kg (143 lb 4.8 oz)  Body mass index is 20.56 kg/m².    Intake/Output Summary (Last 24 hours) at 12/19/2023 1512  Last data filed at 12/18/2023 2019  Gross per 24 hour   Intake 120 ml   Output --   Net 120 ml           Physical Exam  Constitutional:       Appearance: Normal appearance.   HENT:      Head: Normocephalic and atraumatic.      Right Ear: External ear normal.      Left Ear: External ear normal.   Eyes:      Extraocular Movements: Extraocular movements intact.   Cardiovascular:      Rate and Rhythm: Normal rate and regular rhythm.      Pulses: Normal pulses.      Heart sounds: Normal heart sounds.   Pulmonary:      Effort: Pulmonary effort is normal.      Breath sounds: Normal breath sounds.   Abdominal:      General: Abdomen is flat. Bowel sounds are normal. There is no distension.      Palpations: Abdomen is soft.      Tenderness: There is abdominal tenderness. There is no left CVA tenderness, guarding (voluntary) or rebound.   Musculoskeletal:         General: Normal range of motion.   Skin:     Capillary Refill: Capillary refill takes less than 2 seconds.   Neurological:      General: No focal deficit present.      Mental Status: He is alert and oriented to person, place, and time.  Mental status is at baseline.             Significant Labs: All pertinent labs within the past 24 hours have been reviewed.  CBC:   Recent Labs   Lab 12/18/23  0238 12/19/23  0511   WBC 5.51 4.34   HGB 8.0* 8.0*   HCT 26.6* 26.0*    264       CMP:   Recent Labs   Lab 12/18/23  0238 12/19/23  0511    141   K 3.6 3.6    105   CO2 22* 26   GLU 82 99   BUN 4* 5*   CREATININE 0.6 0.7   CALCIUM 8.8 9.1   PROT 6.3 6.6   ALBUMIN 2.6* 2.7*   BILITOT 0.4 0.3   ALKPHOS 67 67   AST 8* 9*   ALT 5* <5*   ANIONGAP 12 10         Significant Imaging: I have reviewed all pertinent imaging results/findings within the past 24 hours.    Assessment/Plan:      * Acute on chronic pancreatitis  Patient with necrotizing pancreatitis c/b chronic peripancreatic fluid collections, initially diagnosed in September 2023 likely 2/2 chronic pancreatitis from alcohol abuse. Per chart review, last drink in September. Patient has had multiple hospital admissions and ED presentations at [facility] and [facility] for symptoms related to his chronic necrotizing pancreatitis including abdominal pain, n/v and melena. He has required multiple abdominal procedures including EGD, IR guided aspiration of fluid collection, EUS with necrosectomy, cystogastrostomy tubes. He has chronic pain which is difficult to manage. lipase is newly elevated to 126 and has an interval mild increase in size of his fluid collection. He is admitted for further workup of acute flare of chronic pancreatis, AES consult and pain management. AES w/ no intervention for pancreatic fluid collection, advance diet as tolerated. Patient w/ no further vomiting, pain relieved w/ pain meds.    - Regular diet  - Pain control: patient takes oxy 5 and dilaudid 2 at home   - IV dilaudid 0.5mg q8H with plan to continue wean   - Oxy 15 q4hr   - increase MS contin to 30mg BID  - Tylenol 1g TID scheduled.  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     Hematemesis  Patient with history of known MW tear noted on EGD 9/2023 presents with nausea and 1x hematemesis. Patient says he has a small amount of blood in his vomit. He takes pantoprazole 40 BID at home. He also endorses some melena the last couple weeks which he was told would be expected with his cystogastrostomy tubes.     - PO pantoprazole 40 BID  - trend hxh  - Resolved at present    HIV infection  Continue home medications, Biktarvy         VTE Risk Mitigation (From admission, onward)           Ordered     IP VTE HIGH RISK PATIENT  Once         12/15/23 0316     Place sequential compression device  Until discontinued         12/15/23 0316                    Discharge Planning   CASTILLO: 12/20/2023     Code Status: Full Code   Is the patient medically ready for discharge?:     Reason for patient still in hospital (select all that apply): Patient trending condition  Discharge Plan A: Home          Noah Trinh MD  Department of Hospital Medicine   [facility] - Intensive Care ([place])

## 2023-12-19 NOTE — ASSESSMENT & PLAN NOTE
Continue home medications, Biktarvy      Prednisone Pregnancy And Lactation Text: This medication is Pregnancy Category C and it isn't know if it is safe during pregnancy. This medication is excreted in breast milk.

## 2023-12-20 LAB
ALBUMIN SERPL BCP-MCNC: 2.8 G/DL (ref 3.5–5.2)
ALP SERPL-CCNC: 67 U/L (ref 55–135)
ALT SERPL W/O P-5'-P-CCNC: 5 U/L (ref 10–44)
ANION GAP SERPL CALC-SCNC: 8 MMOL/L (ref 8–16)
AST SERPL-CCNC: 7 U/L (ref 10–40)
BASOPHILS # BLD AUTO: 0.02 K/UL (ref 0–0.2)
BASOPHILS NFR BLD: 0.4 % (ref 0–1.9)
BILIRUB SERPL-MCNC: 0.2 MG/DL (ref 0.1–1)
BUN SERPL-MCNC: 6 MG/DL (ref 6–20)
CALCIUM SERPL-MCNC: 9.3 MG/DL (ref 8.7–10.5)
CHLORIDE SERPL-SCNC: 103 MMOL/L (ref 95–110)
CO2 SERPL-SCNC: 28 MMOL/L (ref 23–29)
CREAT SERPL-MCNC: 0.7 MG/DL (ref 0.5–1.4)
DIFFERENTIAL METHOD: ABNORMAL
EOSINOPHIL # BLD AUTO: 0.2 K/UL (ref 0–0.5)
EOSINOPHIL NFR BLD: 2.8 % (ref 0–8)
ERYTHROCYTE [DISTWIDTH] IN BLOOD BY AUTOMATED COUNT: 17.9 % (ref 11.5–14.5)
EST. GFR  (NO RACE VARIABLE): >60 ML/MIN/1.73 M^2
GLUCOSE SERPL-MCNC: 90 MG/DL (ref 70–110)
HCT VFR BLD AUTO: 26.4 % (ref 40–54)
HGB BLD-MCNC: 8.1 G/DL (ref 14–18)
IMM GRANULOCYTES # BLD AUTO: 0.01 K/UL (ref 0–0.04)
IMM GRANULOCYTES NFR BLD AUTO: 0.2 % (ref 0–0.5)
LYMPHOCYTES # BLD AUTO: 1.5 K/UL (ref 1–4.8)
LYMPHOCYTES NFR BLD: 29.2 % (ref 18–48)
MCH RBC QN AUTO: 23.9 PG (ref 27–31)
MCHC RBC AUTO-ENTMCNC: 30.7 G/DL (ref 32–36)
MCV RBC AUTO: 78 FL (ref 82–98)
MONOCYTES # BLD AUTO: 0.5 K/UL (ref 0.3–1)
MONOCYTES NFR BLD: 8.5 % (ref 4–15)
NEUTROPHILS # BLD AUTO: 3.1 K/UL (ref 1.8–7.7)
NEUTROPHILS NFR BLD: 58.9 % (ref 38–73)
NRBC BLD-RTO: 0 /100 WBC
PLATELET # BLD AUTO: 288 K/UL (ref 150–450)
PMV BLD AUTO: 10.2 FL (ref 9.2–12.9)
POTASSIUM SERPL-SCNC: 3.8 MMOL/L (ref 3.5–5.1)
PROT SERPL-MCNC: 6.9 G/DL (ref 6–8.4)
RBC # BLD AUTO: 3.39 M/UL (ref 4.6–6.2)
SODIUM SERPL-SCNC: 139 MMOL/L (ref 136–145)
WBC # BLD AUTO: 5.27 K/UL (ref 3.9–12.7)

## 2023-12-20 PROCEDURE — 25000003 PHARM REV CODE 250

## 2023-12-20 PROCEDURE — 36415 COLL VENOUS BLD VENIPUNCTURE: CPT

## 2023-12-20 PROCEDURE — 85025 COMPLETE CBC W/AUTO DIFF WBC: CPT

## 2023-12-20 PROCEDURE — 63600175 PHARM REV CODE 636 W HCPCS

## 2023-12-20 PROCEDURE — 80053 COMPREHEN METABOLIC PANEL: CPT

## 2023-12-20 PROCEDURE — 25000003 PHARM REV CODE 250: Performed by: INTERNAL MEDICINE

## 2023-12-20 PROCEDURE — 12000002 HC ACUTE/MED SURGE SEMI-PRIVATE ROOM

## 2023-12-20 RX ORDER — METHOCARBAMOL 500 MG/1
500 TABLET, FILM COATED ORAL 4 TIMES DAILY
Status: DISCONTINUED | OUTPATIENT
Start: 2023-12-20 | End: 2023-12-21 | Stop reason: HOSPADM

## 2023-12-20 RX ORDER — PREGABALIN 75 MG/1
150 CAPSULE ORAL 2 TIMES DAILY
Status: DISCONTINUED | OUTPATIENT
Start: 2023-12-20 | End: 2023-12-21 | Stop reason: HOSPADM

## 2023-12-20 RX ORDER — HYDROMORPHONE HYDROCHLORIDE 1 MG/ML
0.5 INJECTION, SOLUTION INTRAMUSCULAR; INTRAVENOUS; SUBCUTANEOUS ONCE
Status: COMPLETED | OUTPATIENT
Start: 2023-12-20 | End: 2023-12-20

## 2023-12-20 RX ADMIN — ACETAMINOPHEN 1000 MG: 500 TABLET ORAL at 10:12

## 2023-12-20 RX ADMIN — MORPHINE SULFATE 30 MG: 30 TABLET, FILM COATED, EXTENDED RELEASE ORAL at 08:12

## 2023-12-20 RX ADMIN — SENNOSIDES AND DOCUSATE SODIUM 1 TABLET: 8.6; 5 TABLET ORAL at 09:12

## 2023-12-20 RX ADMIN — OXYCODONE HYDROCHLORIDE 15 MG: 10 TABLET ORAL at 05:12

## 2023-12-20 RX ADMIN — SENNOSIDES AND DOCUSATE SODIUM 1 TABLET: 8.6; 5 TABLET ORAL at 08:12

## 2023-12-20 RX ADMIN — PANTOPRAZOLE SODIUM 40 MG: 40 TABLET, DELAYED RELEASE ORAL at 04:12

## 2023-12-20 RX ADMIN — HYDROMORPHONE HYDROCHLORIDE 0.5 MG: 0.5 INJECTION, SOLUTION INTRAMUSCULAR; INTRAVENOUS; SUBCUTANEOUS at 01:12

## 2023-12-20 RX ADMIN — METHOCARBAMOL 500 MG: 500 TABLET ORAL at 04:12

## 2023-12-20 RX ADMIN — ACETAMINOPHEN 1000 MG: 500 TABLET ORAL at 02:12

## 2023-12-20 RX ADMIN — PREGABALIN 150 MG: 75 CAPSULE ORAL at 08:12

## 2023-12-20 RX ADMIN — HYDROMORPHONE HYDROCHLORIDE 6 MG: 4 TABLET ORAL at 10:12

## 2023-12-20 RX ADMIN — PANTOPRAZOLE SODIUM 40 MG: 40 TABLET, DELAYED RELEASE ORAL at 05:12

## 2023-12-20 RX ADMIN — Medication 6 MG: at 09:12

## 2023-12-20 RX ADMIN — PREGABALIN 150 MG: 75 CAPSULE ORAL at 09:12

## 2023-12-20 RX ADMIN — ACETAMINOPHEN 1000 MG: 500 TABLET ORAL at 09:12

## 2023-12-20 RX ADMIN — HYDROMORPHONE HYDROCHLORIDE 6 MG: 4 TABLET ORAL at 02:12

## 2023-12-20 RX ADMIN — MORPHINE SULFATE 30 MG: 30 TABLET, FILM COATED, EXTENDED RELEASE ORAL at 09:12

## 2023-12-20 RX ADMIN — HYDROMORPHONE HYDROCHLORIDE 6 MG: 4 TABLET ORAL at 06:12

## 2023-12-20 RX ADMIN — BICTEGRAVIR SODIUM, EMTRICITABINE, AND TENOFOVIR ALAFENAMIDE FUMARATE 1 TABLET: 50; 200; 25 TABLET ORAL at 09:12

## 2023-12-20 RX ADMIN — METHOCARBAMOL 500 MG: 500 TABLET ORAL at 08:12

## 2023-12-20 RX ADMIN — FOLIC ACID 1 MG: 1 TABLET ORAL at 09:12

## 2023-12-20 RX ADMIN — PROMETHAZINE HYDROCHLORIDE 25 MG: 12.5 TABLET ORAL at 06:12

## 2023-12-20 RX ADMIN — HYDROMORPHONE HYDROCHLORIDE 0.5 MG: 0.5 INJECTION, SOLUTION INTRAMUSCULAR; INTRAVENOUS; SUBCUTANEOUS at 10:12

## 2023-12-20 NOTE — SUBJECTIVE & OBJECTIVE
Interval History: Patient resting comfortably in bed this AM. Reports two episodes of small volume emesis overnight. 1 small bowel movement overnight after multiple lactulose doses. Patient reports continued severe abdominal pain.    Review of Systems   Constitutional:  Negative for chills, fatigue and fever.   HENT:  Negative for sore throat.    Eyes:  Negative for visual disturbance.   Respiratory:  Negative for cough, choking and wheezing.    Cardiovascular:  Negative for chest pain, palpitations and leg swelling.   Gastrointestinal:  Positive for abdominal pain (midepigastric abdominal pain, unchanged from prior). Negative for constipation, diarrhea and vomiting.   Genitourinary:  Negative for dysuria and urgency.   Musculoskeletal:  Negative for myalgias.   Skin:  Negative for rash.   Neurological:  Negative for light-headedness.     Objective:     Vital Signs (Most Recent):  Temp: 97.9 °F (36.6 °C) (12/20/23 0827)  Pulse: 69 (12/20/23 0827)  Resp: 18 (12/20/23 0901)  BP: 101/65 (12/20/23 0827)  SpO2: 95 % (12/20/23 0827) Vital Signs (24h Range):  Temp:  [97.4 °F (36.3 °C)-98.5 °F (36.9 °C)] 97.9 °F (36.6 °C)  Pulse:  [58-83] 69  Resp:  [16-18] 18  SpO2:  [95 %-100 %] 95 %  BP: ()/(60-81) 101/65     Weight: 65 kg (143 lb 4.8 oz)  Body mass index is 20.56 kg/m².    Intake/Output Summary (Last 24 hours) at 12/20/2023 0952  Last data filed at 12/19/2023 2135  Gross per 24 hour   Intake 500 ml   Output --   Net 500 ml           Physical Exam  Constitutional:       Appearance: Normal appearance.   HENT:      Head: Normocephalic and atraumatic.      Right Ear: External ear normal.      Left Ear: External ear normal.   Eyes:      Extraocular Movements: Extraocular movements intact.   Cardiovascular:      Rate and Rhythm: Normal rate and regular rhythm.      Pulses: Normal pulses.      Heart sounds: Normal heart sounds.   Pulmonary:      Effort: Pulmonary effort is normal.      Breath sounds: Normal breath  sounds.   Abdominal:      General: Abdomen is flat. Bowel sounds are normal. There is no distension.      Palpations: Abdomen is soft.      Tenderness: There is abdominal tenderness. There is no left CVA tenderness, guarding (voluntary) or rebound.   Musculoskeletal:         General: Normal range of motion.   Skin:     Capillary Refill: Capillary refill takes less than 2 seconds.   Neurological:      General: No focal deficit present.      Mental Status: He is alert and oriented to person, place, and time. Mental status is at baseline.             Significant Labs: All pertinent labs within the past 24 hours have been reviewed.  CBC:   Recent Labs   Lab 12/19/23  0511 12/20/23 0514   WBC 4.34 5.27   HGB 8.0* 8.1*   HCT 26.0* 26.4*    288       CMP:   Recent Labs   Lab 12/19/23 0511 12/20/23  0514    139   K 3.6 3.8    103   CO2 26 28   GLU 99 90   BUN 5* 6   CREATININE 0.7 0.7   CALCIUM 9.1 9.3   PROT 6.6 6.9   ALBUMIN 2.7* 2.8*   BILITOT 0.3 0.2   ALKPHOS 67 67   AST 9* 7*   ALT <5* 5*   ANIONGAP 10 8         Significant Imaging: I have reviewed all pertinent imaging results/findings within the past 24 hours.

## 2023-12-20 NOTE — ASSESSMENT & PLAN NOTE
Patient with necrotizing pancreatitis c/b chronic peripancreatic fluid collections, initially diagnosed in September 2023 likely 2/2 chronic pancreatitis from alcohol abuse. Per chart review, last drink in September. Patient has had multiple hospital admissions and ED presentations at [facility] and [facility] for symptoms related to his chronic necrotizing pancreatitis including abdominal pain, n/v and melena. He has required multiple abdominal procedures including EGD, IR guided aspiration of fluid collection, EUS with necrosectomy, cystogastrostomy tubes. He has chronic pain which is difficult to manage. lipase is newly elevated to 126 and has an interval mild increase in size of his fluid collection. He is admitted for further workup of acute flare of chronic pancreatis, AES consult and pain management. AES w/ no intervention for pancreatic fluid collection, advance diet as tolerated. Patient remains without adequate pain control, posing a barrier to discharge.     - Regular diet  - Pain control: patient takes oxy 5 and dilaudid 2 at home. Previous admissions have included pain management consults while inpatient; nonarcotic pain control has previously included lyrica, celebrex, scheduled tylenol.   - Discontinue IV pain medications today   - Oxy 15 q4hr   - MS contin to 30mg BID   - add lyrica 150 mg BID  - Tylenol 1g TID scheduled.  - phenergan PRN for nausea  - labs Q48H

## 2023-12-20 NOTE — PROGRESS NOTES
[facility] - Intensive Care ([address] Medicine  Progress Note    Patient Name: Tasia Cardona  MRN: [id number]  Patient Class: IP- Inpatient   Admission Date: 12/14/2023  Length of Stay: 5 days  Attending Physician: Genna Samuels MD  Primary Care Provider: Pina Jones NP        Subjective:     Principal Problem:Acute on chronic pancreatitis        HPI:  Mr. Cardona is a 22yo M with a PMHx of necrotizing pancreatis with multiple abdominal procedures, HIV, polycythemia vera c/b splenic vein thrombosis (no anticoagulation), asthma, anemia, Corinne Jeronimo tear (9/2023) presents to the ED with 10/10 epigastric pain, hematemesis that started this evening. Patient states he was discharged from [facility] yesterday after removal of cystogastrostomy stent, had min at home and developed abdominal pain. He took an oxy 5 and had 1x episode of hematemesis. He came to [facility] as it is closer to his house. He reports some associated CP and SOB especially with deep breaths or speaking. Has issues with constipation related to his chronic opioid use, his last BM was Monday 12/11. He reports some melanic stools the last 1-2 weeks that he was told would be expected with his recent cystogastrostomy placements and exchanges. He feels they are improving. Upon chart review, he has had intermittent melena the last 6 months.     Of note, the patient has had multiple ED admissions and hospitalizations at [facility] and [facility] for symptoms related to his necrotizing pancreatitis including severe abdominal pain, n/v. He has undergone IR guided aspiration of peripancreatic collections, had cystogastrostomy tubes placed as well as two necrosectomies. He has associated chronic pain that proves difficult to manage.     In the ED, pt afebrile, HDS on RA. CBC with WCC wnl, stable anemia Hb 9.9. CMP unremarkable. Lipase elevated to 136, last noted to be 26 on 12/13 at [facility]. BNP and troponin wnl. CTAP showing slight increase in  peripancreatic collection from 2.9cm x 1.4cm at [facility] on 12/9 to 4.3cm x 1.5cm. Patient received IL NS bolus, IV morphine and dilaudid for pain control and was made NPO. He is being admitted to observation for acute flare of his chronic pancreatitis and pain control.     Overview/Hospital Course:  Mr. Cardona is a 24yo M with a PMHx of necrotizing pancreatis with multiple abdominal procedures, HIV, polycythemia vera c/b splenic vein thrombosis (no anticoagulation), asthma, anemia, Corinne Jeronimo tear (9/2023) admitted for epigastric pain, and hematemesis. On arrival pt afebrile, HDS on RA. WBC 12.5, stable anemia Hb 9.9. CMP unremarkable. Lipase elevated to 136, last noted to be 26 on 12/13 at [facility]. BNP and troponin wnl. CTAP showing slight increase in peripancreatic collection from 2.9cm x 1.4cm at [facility] on 12/9 to 4.3cm x 1.5cm. Patient received IL NS bolus, IV morphine and dilaudid for pain control and was made NPO. AES consulted for evaluation of peripancreatic fluid collection in setting of acute on chronic pancreatitis. Patient started on protonix given hx of hematemesis. Hgb 5.6 on am blood draw, repeat 9.6. AES w/ no need for intervention for stable pancreatic fluid collection. Advancing diet as tolerated, pain control, IVF in setting of pancreatitis. Weaning IV pain medications as able. Patient able to tolerate regular diet without issue, beginning on 12/18 AM. Intermittent episodes of vomiting overnight. Small bowel movement on evening of 12/19 following multiple doses of lactulose. IV dilaudid discontinued on 12/20, and lyrica added on.     Interval History: Patient resting comfortably in bed this AM. Reports two episodes of small volume emesis overnight. 1 small bowel movement overnight after multiple lactulose doses. Patient reports continued severe abdominal pain.    Review of Systems   Constitutional:  Negative for chills, fatigue and fever.   HENT:  Negative for sore throat.    Eyes:  Negative for visual  disturbance.   Respiratory:  Negative for cough, choking and wheezing.    Cardiovascular:  Negative for chest pain, palpitations and leg swelling.   Gastrointestinal:  Positive for abdominal pain (midepigastric abdominal pain, unchanged from prior). Negative for constipation, diarrhea and vomiting.   Genitourinary:  Negative for dysuria and urgency.   Musculoskeletal:  Negative for myalgias.   Skin:  Negative for rash.   Neurological:  Negative for light-headedness.     Objective:     Vital Signs (Most Recent):  Temp: 97.9 °F (36.6 °C) (12/20/23 0827)  Pulse: 69 (12/20/23 0827)  Resp: 18 (12/20/23 0901)  BP: 101/65 (12/20/23 0827)  SpO2: 95 % (12/20/23 0827) Vital Signs (24h Range):  Temp:  [97.4 °F (36.3 °C)-98.5 °F (36.9 °C)] 97.9 °F (36.6 °C)  Pulse:  [58-83] 69  Resp:  [16-18] 18  SpO2:  [95 %-100 %] 95 %  BP: ()/(60-81) 101/65     Weight: 65 kg (143 lb 4.8 oz)  Body mass index is 20.56 kg/m².    Intake/Output Summary (Last 24 hours) at 12/20/2023 0952  Last data filed at 12/19/2023 2135  Gross per 24 hour   Intake 500 ml   Output --   Net 500 ml           Physical Exam  Constitutional:       Appearance: Normal appearance.   HENT:      Head: Normocephalic and atraumatic.      Right Ear: External ear normal.      Left Ear: External ear normal.   Eyes:      Extraocular Movements: Extraocular movements intact.   Cardiovascular:      Rate and Rhythm: Normal rate and regular rhythm.      Pulses: Normal pulses.      Heart sounds: Normal heart sounds.   Pulmonary:      Effort: Pulmonary effort is normal.      Breath sounds: Normal breath sounds.   Abdominal:      General: Abdomen is flat. Bowel sounds are normal. There is no distension.      Palpations: Abdomen is soft.      Tenderness: There is abdominal tenderness. There is no left CVA tenderness, guarding (voluntary) or rebound.   Musculoskeletal:         General: Normal range of motion.   Skin:     Capillary Refill: Capillary refill takes less than 2 seconds.    Neurological:      General: No focal deficit present.      Mental Status: He is alert and oriented to person, place, and time. Mental status is at baseline.             Significant Labs: All pertinent labs within the past 24 hours have been reviewed.  CBC:   Recent Labs   Lab 12/19/23 0511 12/20/23 0514   WBC 4.34 5.27   HGB 8.0* 8.1*   HCT 26.0* 26.4*    288       CMP:   Recent Labs   Lab 12/19/23 0511 12/20/23 0514    139   K 3.6 3.8    103   CO2 26 28   GLU 99 90   BUN 5* 6   CREATININE 0.7 0.7   CALCIUM 9.1 9.3   PROT 6.6 6.9   ALBUMIN 2.7* 2.8*   BILITOT 0.3 0.2   ALKPHOS 67 67   AST 9* 7*   ALT <5* 5*   ANIONGAP 10 8         Significant Imaging: I have reviewed all pertinent imaging results/findings within the past 24 hours.    Assessment/Plan:      * Acute on chronic pancreatitis  Patient with necrotizing pancreatitis c/b chronic peripancreatic fluid collections, initially diagnosed in September 2023 likely 2/2 chronic pancreatitis from alcohol abuse. Per chart review, last drink in September. Patient has had multiple hospital admissions and ED presentations at [facility] and [facility] for symptoms related to his chronic necrotizing pancreatitis including abdominal pain, n/v and melena. He has required multiple abdominal procedures including EGD, IR guided aspiration of fluid collection, EUS with necrosectomy, cystogastrostomy tubes. He has chronic pain which is difficult to manage. lipase is newly elevated to 126 and has an interval mild increase in size of his fluid collection. He is admitted for further workup of acute flare of chronic pancreatis, AES consult and pain management. AES w/ no intervention for pancreatic fluid collection, advance diet as tolerated. Patient remains without adequate pain control, posing a barrier to discharge.     - Regular diet  - Pain control: patient takes oxy 5 and dilaudid 2 at home. Previous admissions have included pain management consults while inpatient;  nonarcotic pain control has previously included lyrica, celebrex, scheduled tylenol.   - Discontinue IV pain medications today; start Dilaudid 6mg PO Q4H   - D/c Oxy 15 q4hr   - MS contin to 30mg BID   - add lyrica 150 mg BID  - Tylenol 1g TID scheduled.  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     Hematemesis  Patient with history of known MW tear noted on EGD 9/2023 presents with nausea and 1x hematemesis. Patient says he has a small amount of blood in his vomit. He takes pantoprazole 40 BID at home. He also endorses some melena the last couple weeks which he was told would be expected with his cystogastrostomy tubes.     - PO pantoprazole 40 BID  - trend hxh  - Resolved at present    HIV infection  -continue home Biktarvy         VTE Risk Mitigation (From admission, onward)           Ordered     IP VTE HIGH RISK PATIENT  Once         12/15/23 0316     Place sequential compression device  Until discontinued         12/15/23 0316                    Discharge Planning   CASTILLO: 12/20/2023     Code Status: Full Code   Is the patient medically ready for discharge?: No    Reason for patient still in hospital (select all that apply): Treatment  Discharge Plan A: Home        Noah Trinh MD  Department of Hospital Medicine   [facility] - Intensive Care ([place])

## 2023-12-21 VITALS
BODY MASS INDEX: 20.52 KG/M2 | RESPIRATION RATE: 18 BRPM | HEIGHT: 70 IN | HEART RATE: 80 BPM | WEIGHT: 143.31 LBS | SYSTOLIC BLOOD PRESSURE: 110 MMHG | OXYGEN SATURATION: 93 % | TEMPERATURE: 98 F | DIASTOLIC BLOOD PRESSURE: 72 MMHG

## 2023-12-21 PROCEDURE — 25000003 PHARM REV CODE 250

## 2023-12-21 RX ORDER — MORPHINE SULFATE 30 MG/1
30 TABLET, FILM COATED, EXTENDED RELEASE ORAL EVERY 12 HOURS
Qty: 14 TABLET | Refills: 0 | Status: SHIPPED | OUTPATIENT
Start: 2023-12-21 | End: 2023-12-29 | Stop reason: HOSPADM

## 2023-12-21 RX ORDER — MORPHINE SULFATE 30 MG/1
30 TABLET, FILM COATED, EXTENDED RELEASE ORAL EVERY 12 HOURS
Qty: 14 TABLET | Refills: 0 | Status: SHIPPED | OUTPATIENT
Start: 2023-12-21 | End: 2023-12-21

## 2023-12-21 RX ORDER — PREGABALIN 150 MG/1
150 CAPSULE ORAL 2 TIMES DAILY
Qty: 28 CAPSULE | Refills: 0 | Status: SHIPPED | OUTPATIENT
Start: 2023-12-21 | End: 2024-01-04

## 2023-12-21 RX ORDER — HYDROMORPHONE HYDROCHLORIDE 4 MG/1
6 TABLET ORAL EVERY 6 HOURS PRN
Qty: 42 TABLET | Refills: 0 | Status: SHIPPED | OUTPATIENT
Start: 2023-12-21 | End: 2023-12-29 | Stop reason: HOSPADM

## 2023-12-21 RX ORDER — PROMETHAZINE HYDROCHLORIDE 25 MG/1
25 TABLET ORAL EVERY 6 HOURS PRN
Qty: 28 TABLET | Refills: 0 | Status: SHIPPED | OUTPATIENT
Start: 2023-12-21 | End: 2023-12-29

## 2023-12-21 RX ORDER — POLYETHYLENE GLYCOL 3350 17 G/17G
17 POWDER, FOR SOLUTION ORAL DAILY
Qty: 510 G | Refills: 0 | Status: SHIPPED | OUTPATIENT
Start: 2023-12-21 | End: 2024-01-20

## 2023-12-21 RX ORDER — METHOCARBAMOL 500 MG/1
500 TABLET, FILM COATED ORAL 4 TIMES DAILY
Qty: 56 TABLET | Refills: 0 | Status: SHIPPED | OUTPATIENT
Start: 2023-12-21 | End: 2024-01-04

## 2023-12-21 RX ORDER — AMOXICILLIN 250 MG
2 CAPSULE ORAL 2 TIMES DAILY PRN
Qty: 60 TABLET | Refills: 0 | Status: SHIPPED | OUTPATIENT
Start: 2023-12-21

## 2023-12-21 RX ORDER — AMOXICILLIN 250 MG
1 CAPSULE ORAL 2 TIMES DAILY
Qty: 42 TABLET | Refills: 0 | Status: SHIPPED | OUTPATIENT
Start: 2023-12-21 | End: 2023-12-21 | Stop reason: HOSPADM

## 2023-12-21 RX ADMIN — ACETAMINOPHEN 1000 MG: 500 TABLET ORAL at 09:12

## 2023-12-21 RX ADMIN — PANTOPRAZOLE SODIUM 40 MG: 40 TABLET, DELAYED RELEASE ORAL at 05:12

## 2023-12-21 RX ADMIN — METHOCARBAMOL 500 MG: 500 TABLET ORAL at 01:12

## 2023-12-21 RX ADMIN — BICTEGRAVIR SODIUM, EMTRICITABINE, AND TENOFOVIR ALAFENAMIDE FUMARATE 1 TABLET: 50; 200; 25 TABLET ORAL at 09:12

## 2023-12-21 RX ADMIN — HYDROMORPHONE HYDROCHLORIDE 6 MG: 4 TABLET ORAL at 05:12

## 2023-12-21 RX ADMIN — HYDROMORPHONE HYDROCHLORIDE 6 MG: 4 TABLET ORAL at 11:12

## 2023-12-21 RX ADMIN — PREGABALIN 150 MG: 75 CAPSULE ORAL at 09:12

## 2023-12-21 RX ADMIN — METHOCARBAMOL 500 MG: 500 TABLET ORAL at 09:12

## 2023-12-21 RX ADMIN — MORPHINE SULFATE 30 MG: 30 TABLET, FILM COATED, EXTENDED RELEASE ORAL at 09:12

## 2023-12-21 RX ADMIN — FOLIC ACID 1 MG: 1 TABLET ORAL at 09:12

## 2023-12-21 RX ADMIN — SENNOSIDES AND DOCUSATE SODIUM 1 TABLET: 8.6; 5 TABLET ORAL at 09:12

## 2023-12-21 NOTE — ASSESSMENT & PLAN NOTE
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 Instructed patient to follow up with hematologist regarding resumption of AC.

## 2023-12-21 NOTE — PLAN OF CARE
Problem: Adult Inpatient Plan of Care  Goal: Absence of Hospital-Acquired Illness or Injury  Outcome: Ongoing, Progressing     Problem: Pain Acute  Goal: Acceptable Pain Control and Functional Ability  Outcome: Ongoing, Progressing  Intervention: Develop Pain Management Plan  Flowsheets (Taken 12/20/2023 2344)  Pain Management Interventions:   care clustered   medication offered   pain management plan reviewed with patient/caregiver   quiet environment facilitated  Intervention: Prevent or Manage Pain  Flowsheets (Taken 12/20/2023 2344)  Bowel Elimination Promotion:   ambulation promoted   diet adjusted   adequate fluid intake promoted  Medication Review/Management: medications reviewed

## 2023-12-21 NOTE — DISCHARGE SUMMARY
[facility] - Intensive Care ([place] [id number])  [facility] Medicine  Discharge Summary      Patient Name: Tasia Cardona  MRN: [id number]  HEATHER: [id number]  Patient Class: IP- Inpatient  Admission Date: 12/14/2023  Hospital Length of Stay: 6 days  Discharge Date and Time:  12/21/2023 1:07 PM  Attending Physician: Erika Hsu*   Discharging Provider: Noah Trinh MD  Primary Care Provider: Pina Jones NP  Hospital Medicine Team: [facility] MED 2 Noha Trinh MD  Primary Care Team: [facility] 2    HPI:   Mr. Cardona is a 24yo M with a PMHx of necrotizing pancreatis with multiple abdominal procedures, HIV, polycythemia vera c/b splenic vein thrombosis (no anticoagulation), asthma, anemia, Corinne Jeronimo tear (9/2023) presents to the ED with 10/10 epigastric pain, hematemesis that started this evening. Patient states he was discharged from [facility] yesterday after removal of cystogastrostomy stent, had min at home and developed abdominal pain. He took an oxy 5 and had 1x episode of hematemesis. He came to [facility] as it is closer to his house. He reports some associated CP and SOB especially with deep breaths or speaking. Has issues with constipation related to his chronic opioid use, his last BM was Monday 12/11. He reports some melanic stools the last 1-2 weeks that he was told would be expected with his recent cystogastrostomy placements and exchanges. He feels they are improving. Upon chart review, he has had intermittent melena the last 6 months.     Of note, the patient has had multiple ED admissions and hospitalizations at [facility] and [facility] for symptoms related to his necrotizing pancreatitis including severe abdominal pain, n/v. He has undergone IR guided aspiration of peripancreatic collections, had cystogastrostomy tubes placed as well as two necrosectomies. He has associated chronic pain that proves difficult to manage.     In the ED, pt afebrile, HDS on RA. CBC with WCC wnl, stable anemia Hb 9.9. CMP  unremarkable. Lipase elevated to 136, last noted to be 26 on 12/13 at [facility]. BNP and troponin wnl. CTAP showing slight increase in peripancreatic collection from 2.9cm x 1.4cm at [facility] on 12/9 to 4.3cm x 1.5cm. Patient received IL NS bolus, IV morphine and dilaudid for pain control and was made NPO. He is being admitted to observation for acute flare of his chronic pancreatitis and pain control.     * No surgery found *      Hospital Course:   Mr. Cardona is a 24yo M with a PMHx of necrotizing pancreatis with multiple abdominal procedures, HIV, polycythemia vera c/b splenic vein thrombosis (no anticoagulation), asthma, anemia, Corinne Jeronimo tear (9/2023) admitted for epigastric pain, and hematemesis. On arrival pt afebrile, HDS on RA. WBC 12.5, stable anemia Hb 9.9. CMP unremarkable. Lipase elevated to 136, last noted to be 26 on 12/13 at [facility]. BNP and troponin wnl. CTAP showing slight increase in peripancreatic collection from 2.9cm x 1.4cm at [facility] on 12/9 to 4.3cm x 1.5cm. Patient received IL NS bolus, IV morphine and dilaudid for pain control and was made NPO. AES consulted for evaluation of peripancreatic fluid collection in setting of acute on chronic pancreatitis. Patient started on protonix given hx of hematemesis. Hgb 5.6 on am blood draw, repeat 9.6. AES w/ no need for intervention for stable pancreatic fluid collection. Advancing diet as tolerated, pain control, IVF in setting of pancreatitis. Weaning IV pain medications as able. Patient able to tolerate regular diet without issue, beginning on 12/18 AM. Intermittent episodes of vomiting overnight. Small bowel movement on evening of 12/19 following multiple doses of lactulose. IV dilaudid discontinued on 12/20; PO dilaudid added and oxycodone discontinued. Additionally, robaxin and lyrica added on. On day of discharged, patient tolerating diet without complication, having bowel movements, and agreeable to discharge with oral pain medications. Plan to discharge  patient with one week of PO dilaudid Q6H and MS contin BID.  checked. Referral placed for pain management clinic.      Vitals:    12/21/23 0803 12/21/23 0916 12/21/23 1117 12/21/23 1148   BP: 114/71   110/72   Patient Position:       Pulse: 83   80   Resp: 18 18 18 18   Temp: 98 °F (36.7 °C)   98 °F (36.7 °C)   TempSrc:       SpO2: 95%   (!) 93%   Weight:       Height:          Physical Exam  Constitutional:       Appearance: Normal appearance.   HENT:      Head: Normocephalic and atraumatic.      Right Ear: External ear normal.      Left Ear: External ear normal.   Eyes:      Extraocular Movements: Extraocular movements intact.   Cardiovascular:      Rate and Rhythm: Normal rate and regular rhythm.      Pulses: Normal pulses.      Heart sounds: Normal heart sounds.   Pulmonary:      Effort: Pulmonary effort is normal.      Breath sounds: Normal breath sounds.   Abdominal:      General: Abdomen is flat. Bowel sounds are normal. There is no distension.      Palpations: Abdomen is soft.      Tenderness: There is abdominal tenderness, diffusely in all 4 quadrants. There is no left CVA tenderness, guarding, or rebound.   Musculoskeletal:         General: Normal range of motion.   Skin:     Capillary Refill: Capillary refill takes less than 2 seconds.   Neurological:      General: No focal deficit present.      Mental Status: He is alert and oriented to person, place, and time. Mental status is at baseline.     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 Instructed patient to follow up with hematologist regarding resumption of AC.     GI  * Acute on chronic pancreatitis  Patient with necrotizing pancreatitis c/b chronic peripancreatic fluid collections, initially diagnosed in September 2023 likely 2/2 chronic pancreatitis from alcohol abuse. Per chart review, last drink in September. Patient has had multiple hospital admissions and ED presentations at [facility] and [facility] for symptoms related to his chronic necrotizing pancreatitis including abdominal pain, n/v and melena. He has required multiple abdominal procedures including EGD, IR guided aspiration of fluid collection, EUS with necrosectomy, cystogastrostomy tubes. He has chronic pain which is difficult to manage. lipase is newly elevated to 126 and has an interval mild increase in size of his fluid collection. He is admitted for further workup of acute flare of chronic pancreatis, AES consult and pain management. AES w/ no intervention for pancreatic fluid collection, advance diet as tolerated. Patient remains without adequate pain control, posing a barrier to discharge.     - Regular diet  - Pain control: patient takes oxy 5 and dilaudid 2 at home. Previous admissions have included pain management consults while inpatient; nonarcotic pain control has previously included lyrica, celebrex, scheduled tylenol.   - Dilaudid 6 mg PO Q6H   - MS contin to 30mg BID   - Robaxin 500 mg QID, lyrica 150 mg BID  - Tylenol 1g TID scheduled.  - phenergan PRN for nausea  - labs Q48H    Melena  See hematemesis      Necrotizing pancreatitis  See acute on chronic pancreatitis       Hematemesis  Patient with history of known MW tear noted on EGD 9/2023 presents with nausea and 1x hematemesis. Patient says he has a small amount of blood in his vomit. He takes pantoprazole 40 BID at home. He also endorses some melena the last couple weeks which he was told would be  expected with his cystogastrostomy tubes.     - PO pantoprazole 40 BID  - trend hxh  - Resolved at present      Final Active Diagnoses:    Diagnosis Date Noted POA    PRINCIPAL PROBLEM:  Acute on chronic pancreatitis [K85.90, K86.1] 12/15/2023 Yes    Melena [K92.1] 12/15/2023 No    Necrotizing pancreatitis [K85.91] 10/31/2023 Yes     Chronic    Splenic vein thrombosis [I82.890] 09/20/2023 Yes    Hematemesis [K92.0] 09/17/2023 Yes    HIV infection [B20] 11/02/2021 Yes      Problems Resolved During this Admission:       Discharged Condition: stable    Disposition: Home or Self Care    Follow Up:    Patient Instructions:      Ambulatory referral/consult to Pain Clinic   Standing Status: Future   Referral Priority: Routine Referral Type: Consultation   Referral Reason: Specialty Services Required   Requested Specialty: Pain Medicine   Number of Visits Requested: 1       Significant Diagnostic Studies: N/A    Pending Diagnostic Studies:       None           Medications:  Reconciled Home Medications:      Medication List        START taking these medications      HYDROmorphone 4 MG tablet  Commonly known as: DILAUDID  Take 1.5 tablets (6 mg total) by mouth every 6 (six) hours as needed for Pain (Severe pain 8-10).     methocarbamoL 500 MG Tab  Commonly known as: ROBAXIN  Take 1 tablet (500 mg total) by mouth 4 (four) times daily. for 14 days     morphine 30 MG 12 hr tablet  Commonly known as: MS CONTIN  Take 1 tablet (30 mg total) by mouth every 12 (twelve) hours. for 7 days     polyethylene glycol 17 gram/dose powder  Commonly known as: GLYCOLAX  Take 17 g by mouth once daily. for 21 days     pregabalin 150 MG capsule  Commonly known as: LYRICA  Take 1 capsule (150 mg total) by mouth 2 (two) times daily. for 14 days     promethazine 25 MG tablet  Commonly known as: PHENERGAN  Take 1 tablet (25 mg total) by mouth every 6 (six) hours as needed.            CONTINUE taking these medications      BIKTARVY -25 mg (25 kg  or greater)  Generic drug: thyjbakat-idadqtuk-swssjte ala  Take 1 tablet by mouth once daily.     folic acid 1 MG tablet  Commonly known as: FOLVITE  Take 1 tablet (1 mg total) by mouth once daily.     pantoprazole 40 MG tablet  Commonly known as: PROTONIX  Take 1 tablet (40 mg total) by mouth 2 (two) times daily.     senna-docusate 8.6-50 mg 8.6-50 mg per tablet  Commonly known as: PERICOLACE  Take 2 tablets by mouth 2 (two) times daily as needed for Constipation.                 Noah Trinh MD  Department of Hospital Medicine  [facility] - Intensive Care ([place]-16)

## 2023-12-21 NOTE — ASSESSMENT & PLAN NOTE
Patient with necrotizing pancreatitis c/b chronic peripancreatic fluid collections, initially diagnosed in September 2023 likely 2/2 chronic pancreatitis from alcohol abuse. Per chart review, last drink in September. Patient has had multiple hospital admissions and ED presentations at [facility] and [facility] for symptoms related to his chronic necrotizing pancreatitis including abdominal pain, n/v and melena. He has required multiple abdominal procedures including EGD, IR guided aspiration of fluid collection, EUS with necrosectomy, cystogastrostomy tubes. He has chronic pain which is difficult to manage. lipase is newly elevated to 126 and has an interval mild increase in size of his fluid collection. He is admitted for further workup of acute flare of chronic pancreatis, AES consult and pain management. AES w/ no intervention for pancreatic fluid collection, advance diet as tolerated. Patient remains without adequate pain control, posing a barrier to discharge.     - Regular diet  - Pain control: patient takes oxy 5 and dilaudid 2 at home. Previous admissions have included pain management consults while inpatient; nonarcotic pain control has previously included lyrica, celebrex, scheduled tylenol.   - Dilaudid 6 mg PO Q6H   - MS contin to 30mg BID   - Robaxin 500 mg QID, lyrica 150 mg BID  - Tylenol 1g TID scheduled.  - phenergan PRN for nausea  - labs Q48H

## 2023-12-21 NOTE — PLAN OF CARE
Problem: Adult Inpatient Plan of Care  Goal: Plan of Care Review  Outcome: Ongoing, Progressing  Flowsheets (Taken 12/20/2023 1841)  Plan of Care Reviewed With: patient  Goal: Patient-Specific Goal (Individualized)  Outcome: Ongoing, Progressing  Goal: Absence of Hospital-Acquired Illness or Injury  Outcome: Ongoing, Progressing  Goal: Optimal Comfort and Wellbeing  Outcome: Ongoing, Progressing     Problem: Pain Acute  Goal: Acceptable Pain Control and Functional Ability  Outcome: Ongoing, Progressing  Intervention: Develop Pain Management Plan  Flowsheets (Taken 12/20/2023 1841)  Pain Management Interventions:   medication offered   quiet environment facilitated   relaxation techniques promoted  Intervention: Prevent or Manage Pain  Flowsheets (Taken 12/20/2023 1841)  Sensory Stimulation Regulation: television on  Intervention: Optimize Psychosocial Wellbeing  Flowsheets (Taken 12/20/2023 1841)  Supportive Measures:   active listening utilized   self-care encouraged

## 2023-12-21 NOTE — PLAN OF CARE
Problem: Adult Inpatient Plan of Care  Goal: Plan of Care Review  Outcome: Met  Goal: Patient-Specific Goal (Individualized)  Outcome: Met  Goal: Absence of Hospital-Acquired Illness or Injury  Outcome: Met  Goal: Optimal Comfort and Wellbeing  Outcome: Met     Problem: Adjustment to Illness (Sepsis/Septic Shock)  Goal: Optimal Coping  Outcome: Met     Problem: Nutrition Impaired (Sepsis/Septic Shock)  Goal: Optimal Nutrition Intake  Outcome: Met     Problem: Pain Acute  Goal: Acceptable Pain Control and Functional Ability  Outcome: Met   Pt being discharged home per MD orders. VSS, pt afebrile and no c/o pain at this time. Reviewed discharge instructions, follow-up's and meds with pt. PIV removed, gauze/tape placed to site. All personal belongings packed and with pt at bedside. Medications delivered at bedside. Ride to be provided by self. refused w/c transport upon discharge.

## 2023-12-21 NOTE — DISCHARGE INSTRUCTIONS
- You have been prescribed one week's worth of narcotic pain medication, including long acting MS contin twice daily and oral dilaudid, up to 4 times daily as needed. It is recommended to also maintain other modalities of pain management, and please continue to also take your lyrica (pregabalin) and robaxin (methocarbamol) as instructed under your medications.     - Please be sure to attend pain management appointment in order to receive needed pain medications. You are currently scheduled for January 9th, but have been wait listed for an appointment at a sooner date.     - To avoid further pancreatic inflammation or other associated pain, it is recommended to maintain a low fat diet and abstention from alcohol.

## 2023-12-22 NOTE — PLAN OF CARE
[facility] - Int[facility] Care ([facility]-16)  Discharge Final Note    Primary Care Provider: Pina Jones, AAMIR    Expected Discharge Date: 12/21/2023    Final Discharge Note (most recent)       Final Note - 12/22/23 0815          Final Note    Assessment Type Final Discharge Note (P)      Anticipated Discharge Disposition Home or Self Care (P)         Post-Acute Status    Discharge Delays None known at this time (P)                      Important Message from Medicare    Pt d/c'd to home.    RHONDA MartínezN, BS, RN, CCM

## 2023-12-25 ENCOUNTER — HOSPITAL ENCOUNTER (INPATIENT)
Facility: HOSPITAL | Age: 23
LOS: 1 days | Discharge: HOME | DRG: 440 | End: 2023-12-28
Attending: EMERGENCY MEDICINE | Admitting: EMERGENCY MEDICINE
Payer: MEDICAID

## 2023-12-25 DIAGNOSIS — K85.90 ACUTE PANCREATITIS, UNSPECIFIED COMPLICATION STATUS, UNSPECIFIED PANCREATITIS TYPE: ICD-10-CM

## 2023-12-25 DIAGNOSIS — R10.9 ABDOMINAL PAIN, UNSPECIFIED ABDOMINAL LOCATION: Primary | ICD-10-CM

## 2023-12-25 DIAGNOSIS — R07.9 CHEST PAIN: ICD-10-CM

## 2023-12-25 DIAGNOSIS — K85.90 PANCREATITIS: ICD-10-CM

## 2023-12-25 DIAGNOSIS — B20 HIV INFECTION, UNSPECIFIED SYMPTOM STATUS: ICD-10-CM

## 2023-12-25 LAB
ALBUMIN SERPL BCP-MCNC: 3.2 G/DL (ref 3.5–5.2)
ALP SERPL-CCNC: 80 U/L (ref 55–135)
ALT SERPL W/O P-5'-P-CCNC: 13 U/L (ref 10–44)
ANION GAP SERPL CALC-SCNC: 11 MMOL/L (ref 8–16)
AST SERPL-CCNC: 16 U/L (ref 10–40)
BASOPHILS # BLD AUTO: 0.03 K/UL (ref 0–0.2)
BASOPHILS NFR BLD: 0.4 % (ref 0–1.9)
BILIRUB SERPL-MCNC: 0.2 MG/DL (ref 0.1–1)
BILIRUB UR QL STRIP: NEGATIVE
BUN SERPL-MCNC: 4 MG/DL (ref 6–20)
CALCIUM SERPL-MCNC: 8.6 MG/DL (ref 8.7–10.5)
CHLORIDE SERPL-SCNC: 101 MMOL/L (ref 95–110)
CLARITY UR REFRACT.AUTO: CLEAR
CO2 SERPL-SCNC: 26 MMOL/L (ref 23–29)
COLOR UR AUTO: YELLOW
CREAT SERPL-MCNC: 0.7 MG/DL (ref 0.5–1.4)
DIFFERENTIAL METHOD: ABNORMAL
EOSINOPHIL # BLD AUTO: 0.2 K/UL (ref 0–0.5)
EOSINOPHIL NFR BLD: 2.9 % (ref 0–8)
ERYTHROCYTE [DISTWIDTH] IN BLOOD BY AUTOMATED COUNT: 18.5 % (ref 11.5–14.5)
EST. GFR  (NO RACE VARIABLE): >60 ML/MIN/1.73 M^2
GLUCOSE SERPL-MCNC: 93 MG/DL (ref 70–110)
GLUCOSE UR QL STRIP: NEGATIVE
HCT VFR BLD AUTO: 38.5 % (ref 40–54)
HGB BLD-MCNC: 11.4 G/DL (ref 14–18)
HGB UR QL STRIP: NEGATIVE
IMM GRANULOCYTES # BLD AUTO: 0.04 K/UL (ref 0–0.04)
IMM GRANULOCYTES NFR BLD AUTO: 0.5 % (ref 0–0.5)
KETONES UR QL STRIP: NEGATIVE
LEUKOCYTE ESTERASE UR QL STRIP: NEGATIVE
LIPASE SERPL-CCNC: 46 U/L (ref 4–60)
LYMPHOCYTES # BLD AUTO: 2.8 K/UL (ref 1–4.8)
LYMPHOCYTES NFR BLD: 37.8 % (ref 18–48)
MCH RBC QN AUTO: 24.4 PG (ref 27–31)
MCHC RBC AUTO-ENTMCNC: 29.6 G/DL (ref 32–36)
MCV RBC AUTO: 82 FL (ref 82–98)
MONOCYTES # BLD AUTO: 0.6 K/UL (ref 0.3–1)
MONOCYTES NFR BLD: 8 % (ref 4–15)
NEUTROPHILS # BLD AUTO: 3.8 K/UL (ref 1.8–7.7)
NEUTROPHILS NFR BLD: 50.4 % (ref 38–73)
NITRITE UR QL STRIP: NEGATIVE
NRBC BLD-RTO: 0 /100 WBC
PH UR STRIP: 7 [PH] (ref 5–8)
PLATELET # BLD AUTO: 456 K/UL (ref 150–450)
PMV BLD AUTO: 10.3 FL (ref 9.2–12.9)
POTASSIUM SERPL-SCNC: 3.6 MMOL/L (ref 3.5–5.1)
PROT SERPL-MCNC: 7 G/DL (ref 6–8.4)
PROT UR QL STRIP: NEGATIVE
RBC # BLD AUTO: 4.68 M/UL (ref 4.6–6.2)
SODIUM SERPL-SCNC: 138 MMOL/L (ref 136–145)
SP GR UR STRIP: 1.03 (ref 1–1.03)
URN SPEC COLLECT METH UR: NORMAL
WBC # BLD AUTO: 7.51 K/UL (ref 3.9–12.7)

## 2023-12-25 PROCEDURE — 80053 COMPREHEN METABOLIC PANEL: CPT | Performed by: EMERGENCY MEDICINE

## 2023-12-25 PROCEDURE — 85025 COMPLETE CBC W/AUTO DIFF WBC: CPT | Performed by: EMERGENCY MEDICINE

## 2023-12-25 PROCEDURE — 99285 EMERGENCY DEPT VISIT HI MDM: CPT | Mod: 25

## 2023-12-25 PROCEDURE — 96376 TX/PRO/DX INJ SAME DRUG ADON: CPT

## 2023-12-25 PROCEDURE — 96375 TX/PRO/DX INJ NEW DRUG ADDON: CPT

## 2023-12-25 PROCEDURE — 25500020 PHARM REV CODE 255: Performed by: EMERGENCY MEDICINE

## 2023-12-25 PROCEDURE — 81003 URINALYSIS AUTO W/O SCOPE: CPT | Performed by: EMERGENCY MEDICINE

## 2023-12-25 PROCEDURE — 83690 ASSAY OF LIPASE: CPT | Performed by: EMERGENCY MEDICINE

## 2023-12-25 PROCEDURE — 96374 THER/PROPH/DIAG INJ IV PUSH: CPT

## 2023-12-25 PROCEDURE — 63600175 PHARM REV CODE 636 W HCPCS: Performed by: EMERGENCY MEDICINE

## 2023-12-25 PROCEDURE — 80047 BASIC METABLC PNL IONIZED CA: CPT

## 2023-12-25 RX ORDER — HYDROMORPHONE HYDROCHLORIDE 1 MG/ML
1 INJECTION, SOLUTION INTRAMUSCULAR; INTRAVENOUS; SUBCUTANEOUS
Status: COMPLETED | OUTPATIENT
Start: 2023-12-25 | End: 2023-12-25

## 2023-12-25 RX ORDER — HYDROMORPHONE HYDROCHLORIDE 1 MG/ML
2 INJECTION, SOLUTION INTRAMUSCULAR; INTRAVENOUS; SUBCUTANEOUS
Status: COMPLETED | OUTPATIENT
Start: 2023-12-25 | End: 2023-12-25

## 2023-12-25 RX ORDER — ONDANSETRON 2 MG/ML
4 INJECTION INTRAMUSCULAR; INTRAVENOUS
Status: COMPLETED | OUTPATIENT
Start: 2023-12-25 | End: 2023-12-25

## 2023-12-25 RX ADMIN — SODIUM CHLORIDE, POTASSIUM CHLORIDE, SODIUM LACTATE AND CALCIUM CHLORIDE 1000 ML: 600; 310; 30; 20 INJECTION, SOLUTION INTRAVENOUS at 09:12

## 2023-12-25 RX ADMIN — ONDANSETRON 4 MG: 2 INJECTION INTRAMUSCULAR; INTRAVENOUS at 09:12

## 2023-12-25 RX ADMIN — HYDROMORPHONE HYDROCHLORIDE 1 MG: 1 INJECTION, SOLUTION INTRAMUSCULAR; INTRAVENOUS; SUBCUTANEOUS at 11:12

## 2023-12-25 RX ADMIN — HYDROMORPHONE HYDROCHLORIDE 2 MG: 1 INJECTION, SOLUTION INTRAMUSCULAR; INTRAVENOUS; SUBCUTANEOUS at 09:12

## 2023-12-25 RX ADMIN — IOHEXOL 75 ML: 350 INJECTION, SOLUTION INTRAVENOUS at 09:12

## 2023-12-26 LAB
BUN SERPL-MCNC: 3 MG/DL (ref 6–30)
CHLORIDE SERPL-SCNC: 104 MMOL/L (ref 95–110)
CREAT SERPL-MCNC: 0.7 MG/DL (ref 0.5–1.4)
GLUCOSE SERPL-MCNC: 100 MG/DL (ref 70–110)
HCT VFR BLD CALC: 38 %PCV (ref 36–54)
POC IONIZED CALCIUM: 0.87 MMOL/L (ref 1.06–1.42)
POC TCO2 (MEASURED): 26 MMOL/L (ref 23–29)
POTASSIUM BLD-SCNC: >9 MMOL/L (ref 3.5–5.1)
SAMPLE: ABNORMAL
SODIUM BLD-SCNC: 130 MMOL/L (ref 136–145)

## 2023-12-26 PROCEDURE — 96376 TX/PRO/DX INJ SAME DRUG ADON: CPT

## 2023-12-26 PROCEDURE — G0378 HOSPITAL OBSERVATION PER HR: HCPCS

## 2023-12-26 PROCEDURE — 63600175 PHARM REV CODE 636 W HCPCS

## 2023-12-26 PROCEDURE — 25000003 PHARM REV CODE 250

## 2023-12-26 PROCEDURE — 63600175 PHARM REV CODE 636 W HCPCS: Performed by: EMERGENCY MEDICINE

## 2023-12-26 RX ORDER — FOLIC ACID 1 MG/1
1 TABLET ORAL DAILY
Status: DISCONTINUED | OUTPATIENT
Start: 2023-12-26 | End: 2023-12-28 | Stop reason: HOSPADM

## 2023-12-26 RX ORDER — PREGABALIN 75 MG/1
150 CAPSULE ORAL 2 TIMES DAILY
Status: DISCONTINUED | OUTPATIENT
Start: 2023-12-26 | End: 2023-12-28 | Stop reason: HOSPADM

## 2023-12-26 RX ORDER — HYDROMORPHONE HYDROCHLORIDE 1 MG/ML
1 INJECTION, SOLUTION INTRAMUSCULAR; INTRAVENOUS; SUBCUTANEOUS
Status: COMPLETED | OUTPATIENT
Start: 2023-12-26 | End: 2023-12-26

## 2023-12-26 RX ORDER — METHOCARBAMOL 500 MG/1
500 TABLET, FILM COATED ORAL 4 TIMES DAILY
Status: DISCONTINUED | OUTPATIENT
Start: 2023-12-26 | End: 2023-12-28 | Stop reason: HOSPADM

## 2023-12-26 RX ORDER — ONDANSETRON 2 MG/ML
4 INJECTION INTRAMUSCULAR; INTRAVENOUS EVERY 8 HOURS PRN
Status: DISCONTINUED | OUTPATIENT
Start: 2023-12-26 | End: 2023-12-27

## 2023-12-26 RX ORDER — IBUPROFEN 200 MG
16 TABLET ORAL
Status: DISCONTINUED | OUTPATIENT
Start: 2023-12-26 | End: 2023-12-28 | Stop reason: HOSPADM

## 2023-12-26 RX ORDER — SODIUM CHLORIDE, SODIUM LACTATE, POTASSIUM CHLORIDE, CALCIUM CHLORIDE 600; 310; 30; 20 MG/100ML; MG/100ML; MG/100ML; MG/100ML
INJECTION, SOLUTION INTRAVENOUS CONTINUOUS
Status: ACTIVE | OUTPATIENT
Start: 2023-12-26 | End: 2023-12-27

## 2023-12-26 RX ORDER — HYDROMORPHONE HYDROCHLORIDE 2 MG/1
6 TABLET ORAL EVERY 6 HOURS PRN
Status: DISCONTINUED | OUTPATIENT
Start: 2023-12-26 | End: 2023-12-28

## 2023-12-26 RX ORDER — SODIUM CHLORIDE 0.9 % (FLUSH) 0.9 %
10 SYRINGE (ML) INJECTION EVERY 12 HOURS PRN
Status: DISCONTINUED | OUTPATIENT
Start: 2023-12-26 | End: 2023-12-28 | Stop reason: HOSPADM

## 2023-12-26 RX ORDER — IBUPROFEN 200 MG
24 TABLET ORAL
Status: DISCONTINUED | OUTPATIENT
Start: 2023-12-26 | End: 2023-12-28 | Stop reason: HOSPADM

## 2023-12-26 RX ORDER — PANTOPRAZOLE SODIUM 40 MG/1
40 TABLET, DELAYED RELEASE ORAL 2 TIMES DAILY
Status: DISCONTINUED | OUTPATIENT
Start: 2023-12-26 | End: 2023-12-28 | Stop reason: HOSPADM

## 2023-12-26 RX ORDER — GLUCAGON 1 MG
1 KIT INJECTION
Status: DISCONTINUED | OUTPATIENT
Start: 2023-12-26 | End: 2023-12-28 | Stop reason: HOSPADM

## 2023-12-26 RX ORDER — PROMETHAZINE HYDROCHLORIDE 12.5 MG/1
25 TABLET ORAL EVERY 6 HOURS PRN
Status: DISCONTINUED | OUTPATIENT
Start: 2023-12-26 | End: 2023-12-27

## 2023-12-26 RX ORDER — HYDROMORPHONE HYDROCHLORIDE 1 MG/ML
1 INJECTION, SOLUTION INTRAMUSCULAR; INTRAVENOUS; SUBCUTANEOUS EVERY 4 HOURS PRN
Status: DISCONTINUED | OUTPATIENT
Start: 2023-12-26 | End: 2023-12-27

## 2023-12-26 RX ORDER — NALOXONE HCL 0.4 MG/ML
0.02 VIAL (ML) INJECTION
Status: DISCONTINUED | OUTPATIENT
Start: 2023-12-26 | End: 2023-12-28 | Stop reason: HOSPADM

## 2023-12-26 RX ORDER — AMOXICILLIN 250 MG
2 CAPSULE ORAL 2 TIMES DAILY PRN
Status: DISCONTINUED | OUTPATIENT
Start: 2023-12-26 | End: 2023-12-28 | Stop reason: HOSPADM

## 2023-12-26 RX ORDER — POLYETHYLENE GLYCOL 3350 17 G/17G
17 POWDER, FOR SOLUTION ORAL ONCE
Status: DISCONTINUED | OUTPATIENT
Start: 2023-12-26 | End: 2023-12-28 | Stop reason: HOSPADM

## 2023-12-26 RX ORDER — ONDANSETRON 8 MG/1
8 TABLET, ORALLY DISINTEGRATING ORAL EVERY 8 HOURS PRN
Status: DISCONTINUED | OUTPATIENT
Start: 2023-12-26 | End: 2023-12-26

## 2023-12-26 RX ORDER — HYDROMORPHONE HYDROCHLORIDE 1 MG/ML
1 INJECTION, SOLUTION INTRAMUSCULAR; INTRAVENOUS; SUBCUTANEOUS EVERY 6 HOURS PRN
Status: DISCONTINUED | OUTPATIENT
Start: 2023-12-26 | End: 2023-12-26

## 2023-12-26 RX ORDER — MORPHINE SULFATE 30 MG/1
30 TABLET, FILM COATED, EXTENDED RELEASE ORAL EVERY 12 HOURS
Status: DISCONTINUED | OUTPATIENT
Start: 2023-12-26 | End: 2023-12-28 | Stop reason: HOSPADM

## 2023-12-26 RX ADMIN — HYDROMORPHONE HYDROCHLORIDE 1 MG: 1 INJECTION, SOLUTION INTRAMUSCULAR; INTRAVENOUS; SUBCUTANEOUS at 01:12

## 2023-12-26 RX ADMIN — METHOCARBAMOL 500 MG: 500 TABLET ORAL at 08:12

## 2023-12-26 RX ADMIN — METHOCARBAMOL 500 MG: 500 TABLET ORAL at 02:12

## 2023-12-26 RX ADMIN — HYDROMORPHONE HYDROCHLORIDE 1 MG: 1 INJECTION, SOLUTION INTRAMUSCULAR; INTRAVENOUS; SUBCUTANEOUS at 02:12

## 2023-12-26 RX ADMIN — PANTOPRAZOLE SODIUM 40 MG: 40 TABLET, DELAYED RELEASE ORAL at 08:12

## 2023-12-26 RX ADMIN — PREGABALIN 150 MG: 75 CAPSULE ORAL at 08:12

## 2023-12-26 RX ADMIN — MORPHINE SULFATE 30 MG: 30 TABLET, FILM COATED, EXTENDED RELEASE ORAL at 08:12

## 2023-12-26 RX ADMIN — HYDROMORPHONE HYDROCHLORIDE 1 MG: 1 INJECTION, SOLUTION INTRAMUSCULAR; INTRAVENOUS; SUBCUTANEOUS at 08:12

## 2023-12-26 RX ADMIN — HYDROMORPHONE HYDROCHLORIDE 1 MG: 1 INJECTION, SOLUTION INTRAMUSCULAR; INTRAVENOUS; SUBCUTANEOUS at 10:12

## 2023-12-26 RX ADMIN — METHOCARBAMOL 500 MG: 500 TABLET ORAL at 05:12

## 2023-12-26 RX ADMIN — PROMETHAZINE HYDROCHLORIDE 25 MG: 12.5 TABLET ORAL at 03:12

## 2023-12-26 RX ADMIN — SODIUM CHLORIDE, POTASSIUM CHLORIDE, SODIUM LACTATE AND CALCIUM CHLORIDE: 600; 310; 30; 20 INJECTION, SOLUTION INTRAVENOUS at 04:12

## 2023-12-26 RX ADMIN — HYDROMORPHONE HYDROCHLORIDE 1 MG: 1 INJECTION, SOLUTION INTRAMUSCULAR; INTRAVENOUS; SUBCUTANEOUS at 06:12

## 2023-12-26 RX ADMIN — FOLIC ACID 1 MG: 1 TABLET ORAL at 08:12

## 2023-12-26 RX ADMIN — BICTEGRAVIR SODIUM, EMTRICITABINE, AND TENOFOVIR ALAFENAMIDE FUMARATE 1 TABLET: 50; 200; 25 TABLET ORAL at 08:12

## 2023-12-26 NOTE — ASSESSMENT & PLAN NOTE
Patient has history of  splenic vein thrombosis with gastric varices (11/2023 on CTA) in the setting of polycythemia vera and chronic pancreatitis, he is not currently on anticoagulation.

## 2023-12-26 NOTE — HPI
Mr. Cardona 23 y.o M with the PMH of necrotizing pancreatitis, splenic vein thrombosis, polycythemia vera, and HIV presents to the ED for worsening abdominal pain nausea and vomiting that started yesterday. He wasn't able to keep any of his pain medicines down. He has very extensive history of repeated ED visits for this pain. He tried the morphine and dilaudid at home and they didn't help. He had 9/10 pain diffusely in the upper abdomen radiating to his shoulder. He ate food at chinese restaurant and after that started having N/V. He stopped drinking alcohol in September after his diagnosis of pancreatitis. He get phlebotomy for his PV every week sometimes every month depending on the severity of symptoms. He was on Eliquis before but not anymore. He had pancreatic stents placed and exchanged three times at [facility]. Last one was removed on 12/13. He has had extensive work up and endoscopies done int he past. Was found to have Corinne hitchcock tear after an episode of hematemesis. Patient was diagnosed with HIV 2 years ago and has been on anti retroviral therapy. He is sexually active with male partners. His most recent CD 4 count 2 months ago was 1080.     In the ED patient was vitally stable, /74, HR 79, RR 18, SpO2  97% on RA. UA was unremarkable. Labs show BUN 4, Cr 0.7, Lipase 46, Hb 11.4, WBC 7.51, Plt 456. CT abdomen pelvis shows Sequela of pancreatitis as detailed above with several small thick-walled fluid collections adjacent to the pancreas, improved from prior CT. Evidence concerning for portal hypertension including splenomegaly, dilation of the main portal vein, and collateral vessel formation through the upper abdomen. Hepatomegaly. Mural thickening of the gastric antrum and pylorus, suggestive of significant gastritis.  No free intraperitoneal air.

## 2023-12-26 NOTE — ASSESSMENT & PLAN NOTE
Patient has been having abdominal pain along with nausea and vomiting.     PLAN:  -Continue home promethazine  -I/V zofran if unable to tolerate PO

## 2023-12-26 NOTE — SUBJECTIVE & OBJECTIVE
Past Medical History:   Diagnosis Date    Acute necrotizing pancreatitis 09/20/2023    Alcohol use disorder 10/05/2023    Asthma     Hepatitis C antibody positive in blood 09/18/2023 9/2023 PCR negative    HIV infection 10/2021    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       Past Surgical History:   Procedure Laterality Date    ENDOSCOPIC ULTRASOUND OF UPPER GASTROINTESTINAL TRACT N/A 10/23/2023    Procedure: ULTRASOUND, UPPER GI TRACT, ENDOSCOPIC;  Surgeon: Emil Villatoro MD;  Location: [facility] ([address] [place] [address]);  Service: Endoscopy;  Laterality: N/A;    ERCP N/A 10/23/2023    Procedure: ERCP (ENDOSCOPIC RETROGRADE CHOLANGIOPANCREATOGRAPHY);  Surgeon: Emil Villatoro MD;  Location: [facility] ([facility]);  Service: Endoscopy;  Laterality: N/A;    ESOPHAGOGASTRODUODENOSCOPY N/A 9/18/2023    Procedure: EGD (ESOPHAGOGASTRODUODENOSCOPY);  Surgeon: Kg Cleaning MD;  Location: [facility] ([facility]);  Service: Endoscopy;  Laterality: N/A;       Review of patient's allergies indicates:   Allergen Reactions    Sumner Swelling    Pcn [penicillins] Hives     Tolerates cephalosporins    Pecan nut Swelling    Sulfa (sulfonamide antibiotics) Hives       No current facility-administered medications on file prior to encounter.     Current Outpatient Medications on File Prior to Encounter   Medication Sig    ltwchuqto-uudsczfm-cawaayc ala (BIKTARVY) -25 mg (25 kg or greater) Take 1 tablet by mouth once daily.    folic acid (FOLVITE) 1 MG tablet Take 1 tablet (1 mg total) by mouth once daily.    HYDROmorphone (DILAUDID) 4 MG tablet Take 1.5 tablets (6 mg total) by mouth every 6 (six) hours as needed for Pain (Severe pain 8-10).    methocarbamoL (ROBAXIN) 500 MG Tab Take 1 tablet (500 mg total) by mouth 4 (four) times daily. for  14 days    morphine (MS CONTIN) 30 MG 12 hr tablet Take 1 tablet (30 mg total) by mouth every 12 (twelve) hours. for 7 days    pantoprazole (PROTONIX) 40 MG tablet Take 1 tablet (40 mg total) by mouth 2 (two) times daily.    polyethylene glycol (GLYCOLAX) 17 gram/dose powder Use cap to measure out (17 g) mix with a liquid and take by mouth once daily for 21 days.    pregabalin (LYRICA) 150 MG capsule Take 1 capsule (150 mg total) by mouth 2 (two) times daily. for 14 days    promethazine (PHENERGAN) 25 MG tablet Take 1 tablet (25 mg total) by mouth every 6 (six) hours as needed.    senna-docusate 8.6-50 mg (PERICOLACE) 8.6-50 mg per tablet Take 2 tablets by mouth 2 (two) times daily as needed for Constipation.     Family History       Problem Relation (Age of Onset)    Breast cancer Maternal Grandmother    Cancer Mother, Brother    No Known Problems Father    Ovarian cancer Mother    Pancreatitis Mother          Tobacco Use    Smoking status: Former     Types: Cigarettes    Smokeless tobacco: Never   Substance and Sexual Activity    Alcohol use: Not Currently    Drug use: Never    Sexual activity: Not on file     Review of Systems   Constitutional:  Negative for chills, fatigue and fever.   HENT: Negative.     Eyes: Negative.    Cardiovascular:  Negative for chest pain and leg swelling.   Gastrointestinal:  Positive for abdominal pain, nausea and vomiting. Negative for diarrhea.   Endocrine: Negative for polydipsia and polyphagia.   Genitourinary:  Negative for frequency, hematuria and urgency.   Musculoskeletal:  Negative for arthralgias and myalgias.   Skin:  Negative for rash.   Psychiatric/Behavioral:  Negative for agitation and behavioral problems. The patient is not nervous/anxious.      Objective:     Vital Signs (Most Recent):  Temp: 98.2 °F (36.8 °C) (12/26/23 0027)  Pulse: 79 (12/26/23 0027)  Resp: 18 (12/26/23 0104)  BP: 115/74 (12/26/23 0027)  SpO2: 97 % (12/26/23 0027) Vital Signs (24h Range):  Temp:   [98.2 °F (36.8 °C)-98.3 °F (36.8 °C)] 98.2 °F (36.8 °C)  Pulse:  [] 79  Resp:  [16-18] 18  SpO2:  [97 %-99 %] 97 %  BP: (115-119)/(74-75) 115/74     Weight: 63.5 kg (140 lb)  Body mass index is 20.09 kg/m².     Physical Exam  Constitutional:       Appearance: Normal appearance.      Comments: Uncomfortable but not toxic appearing   HENT:      Head: Atraumatic.      Mouth/Throat:      Mouth: Mucous membranes are moist.   Eyes:      Extraocular Movements: Extraocular movements intact.   Cardiovascular:      Rate and Rhythm: Normal rate and regular rhythm.      Pulses: Normal pulses.      Heart sounds: Normal heart sounds.   Pulmonary:      Effort: Pulmonary effort is normal.      Breath sounds: Normal breath sounds.   Abdominal:      General: Bowel sounds are normal.      Palpations: There is no mass.      Tenderness: There is abdominal tenderness. There is guarding. There is no rebound.      Comments: Diffuse upper abdominal tenderness. Scar marks on lower belly from previous accident.   Musculoskeletal:      Right lower leg: No edema.      Left lower leg: No edema.   Skin:     General: Skin is warm.      Findings: No bruising.   Neurological:      General: No focal deficit present.      Mental Status: He is oriented to person, place, and time.                Significant Labs: All pertinent labs within the past 24 hours have been reviewed.    Significant Imaging: I have reviewed all pertinent imaging results/findings within the past 24 hours.

## 2023-12-26 NOTE — PLAN OF CARE
Problem: Adult Inpatient Plan of Care  Goal: Plan of Care Review  Outcome: Ongoing, Progressing  Goal: Absence of Hospital-Acquired Illness or Injury  Outcome: Ongoing, Progressing  Goal: Optimal Comfort and Wellbeing  Outcome: Ongoing, Progressing  Goal: Readiness for Transition of Care  Outcome: Ongoing, Progressing     Problem: Adjustment to Illness (Sepsis/Septic Shock)  Goal: Optimal Coping  Outcome: Ongoing, Progressing     Problem: Bleeding (Sepsis/Septic Shock)  Goal: Absence of Bleeding  Outcome: Ongoing, Progressing     Problem: Glycemic Control Impaired (Sepsis/Septic Shock)  Goal: Blood Glucose Level Within Desired Range  Outcome: Ongoing, Progressing     Problem: Infection Progression (Sepsis/Septic Shock)  Goal: Absence of Infection Signs and Symptoms  Outcome: Ongoing, Progressing     Problem: Nutrition Impaired (Sepsis/Septic Shock)  Goal: Optimal Nutrition Intake  Outcome: Ongoing, Progressing

## 2023-12-26 NOTE — H&P
[facility] - Intensive Care ([address] Medicine  History & Physical    Patient Name: Tasia Cardona  MRN: [id number]  Patient Class: OP- Observation  Admission Date: 12/25/2023  Attending Physician: Janey Cho MD   Primary Care Provider: Pina Jones NP         Patient information was obtained from patient and ER records.     Subjective:     Principal Problem:Necrotizing pancreatitis    Chief Complaint:   Chief Complaint   Patient presents with    Abdominal Pain     Pt reports abd pain in the upper quad that began around 12 today. Pt reports n/v. Denies diarrhea or fever. Hx of frequent pancreatitis.         HPI: Mr. Cardona 23 y.o M with the PMH of necrotizing pancreatitis, splenic vein thrombosis, polycythemia vera, and HIV presents to the ED for worsening abdominal pain nausea and vomiting that started yesterday. He wasn't able to keep any of his pain medicines down. He has very extensive history of repeated ED visits for this pain. He tried the morphine and dilaudid at home and they didn't help. He had 9/10 pain diffusely in the upper abdomen radiating to his shoulder. He ate food at chinese restaurant and after that started having N/V. He stopped drinking alcohol in September after his diagnosis of pancreatitis. He get phlebotomy for his PV every week sometimes every month depending on the severity of symptoms. He was on Eliquis before but not anymore. He had pancreatic stents placed and exchanged three times at [facility]. Last one was removed on 12/13. He has had extensive work up and endoscopies done int he past. Was found to have Corinne hitchcock tear after an episode of hematemesis. Patient was diagnosed with HIV 2 years ago and has been on anti retroviral therapy. He is sexually active with male partners. His most recent CD 4 count 2 months ago was 1080.     In the ED patient was vitally stable, /74, HR 79, RR 18, SpO2  97% on RA. UA was unremarkable. Labs show BUN 4, Cr 0.7,  Lipase 46, Hb 11.4, WBC 7.51, Plt 456. Received fluids in the ED. CT abdomen pelvis shows Sequela of pancreatitis as detailed above with several small thick-walled fluid collections adjacent to the pancreas, improved from prior CT. Evidence concerning for portal hypertension including splenomegaly, dilation of the main portal vein, and collateral vessel formation through the upper abdomen. Hepatomegaly. Mural thickening of the gastric antrum and pylorus, suggestive of significant gastritis.  No free intraperitoneal air.       Past Medical History:   Diagnosis Date    Acute necrotizing pancreatitis 09/20/2023    Alcohol use disorder 10/05/2023    Asthma     Hepatitis C antibody positive in blood 09/18/2023 9/2023 PCR negative    HIV infection 10/2021    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       Past Surgical History:   Procedure Laterality Date    ENDOSCOPIC ULTRASOUND OF UPPER GASTROINTESTINAL TRACT N/A 10/23/2023    Procedure: ULTRASOUND, UPPER GI TRACT, ENDOSCOPIC;  Surgeon: Emil Villatoro MD;  Location: [address]);  Service: Endoscopy;  Laterality: N/A;    ERCP N/A 10/23/2023    Procedure: ERCP (ENDOSCOPIC RETROGRADE CHOLANGIOPANCREATOGRAPHY);  Surgeon: Emil Villatoro MD;  Location: [address]);  Service: Endoscopy;  Laterality: N/A;    ESOPHAGOGASTRODUODENOSCOPY N/A 9/18/2023    Procedure: EGD (ESOPHAGOGASTRODUODENOSCOPY);  Surgeon: Kg Cleaning MD;  Location: [address]);  Service: Endoscopy;  Laterality: N/A;       Review of patient's allergies indicates:   Allergen Reactions    Kewadin Swelling    Pcn [penicillins] Hives     Tolerates cephalosporins    Pecan nut Swelling    Sulfa (sulfonamide antibiotics) Hives       No current facility-administered medications on file  prior to encounter.     Current Outpatient Medications on File Prior to Encounter   Medication Sig    zxrmfybfw-tgmomusg-ronalft ala (BIKTARVY) -25 mg (25 kg or greater) Take 1 tablet by mouth once daily.    folic acid (FOLVITE) 1 MG tablet Take 1 tablet (1 mg total) by mouth once daily.    HYDROmorphone (DILAUDID) 4 MG tablet Take 1.5 tablets (6 mg total) by mouth every 6 (six) hours as needed for Pain (Severe pain 8-10).    methocarbamoL (ROBAXIN) 500 MG Tab Take 1 tablet (500 mg total) by mouth 4 (four) times daily. for 14 days    morphine (MS CONTIN) 30 MG 12 hr tablet Take 1 tablet (30 mg total) by mouth every 12 (twelve) hours. for 7 days    pantoprazole (PROTONIX) 40 MG tablet Take 1 tablet (40 mg total) by mouth 2 (two) times daily.    polyethylene glycol (GLYCOLAX) 17 gram/dose powder Use cap to measure out (17 g) mix with a liquid and take by mouth once daily for 21 days.    pregabalin (LYRICA) 150 MG capsule Take 1 capsule (150 mg total) by mouth 2 (two) times daily. for 14 days    promethazine (PHENERGAN) 25 MG tablet Take 1 tablet (25 mg total) by mouth every 6 (six) hours as needed.    senna-docusate 8.6-50 mg (PERICOLACE) 8.6-50 mg per tablet Take 2 tablets by mouth 2 (two) times daily as needed for Constipation.     Family History       Problem Relation (Age of Onset)    Breast cancer Maternal Grandmother    Cancer Mother, Brother    No Known Problems Father    Ovarian cancer Mother    Pancreatitis Mother          Tobacco Use    Smoking status: Former     Types: Cigarettes    Smokeless tobacco: Never   Substance and Sexual Activity    Alcohol use: Not Currently    Drug use: Never    Sexual activity: Not on file     Review of Systems   Constitutional:  Negative for chills, fatigue and fever.   HENT: Negative.     Eyes: Negative.    Cardiovascular:  Negative for chest pain and leg swelling.   Gastrointestinal:  Positive for abdominal pain, nausea and vomiting. Negative for diarrhea.   Endocrine:  Negative for polydipsia and polyphagia.   Genitourinary:  Negative for frequency, hematuria and urgency.   Musculoskeletal:  Negative for arthralgias and myalgias.   Skin:  Negative for rash.   Psychiatric/Behavioral:  Negative for agitation and behavioral problems. The patient is not nervous/anxious.      Objective:     Vital Signs (Most Recent):  Temp: 98.2 °F (36.8 °C) (12/26/23 0027)  Pulse: 79 (12/26/23 0027)  Resp: 18 (12/26/23 0104)  BP: 115/74 (12/26/23 0027)  SpO2: 97 % (12/26/23 0027) Vital Signs (24h Range):  Temp:  [98.2 °F (36.8 °C)-98.3 °F (36.8 °C)] 98.2 °F (36.8 °C)  Pulse:  [] 79  Resp:  [16-18] 18  SpO2:  [97 %-99 %] 97 %  BP: (115-119)/(74-75) 115/74     Weight: 63.5 kg (140 lb)  Body mass index is 20.09 kg/m².     Physical Exam  Constitutional:       Appearance: Normal appearance.      Comments: Uncomfortable but not toxic appearing   HENT:      Head: Atraumatic.      Mouth/Throat:      Mouth: Mucous membranes are moist.   Eyes:      Extraocular Movements: Extraocular movements intact.   Cardiovascular:      Rate and Rhythm: Normal rate and regular rhythm.      Pulses: Normal pulses.      Heart sounds: Normal heart sounds.   Pulmonary:      Effort: Pulmonary effort is normal.      Breath sounds: Normal breath sounds.   Abdominal:      General: Bowel sounds are normal.      Palpations: There is no mass.      Tenderness: There is abdominal tenderness. There is guarding. There is no rebound.      Comments: Diffuse upper abdominal tenderness. Scar marks on lower belly from previous accident.   Musculoskeletal:      Right lower leg: No edema.      Left lower leg: No edema.   Skin:     General: Skin is warm.      Findings: No bruising.   Neurological:      General: No focal deficit present.      Mental Status: He is oriented to person, place, and time.                Significant Labs: All pertinent labs within the past 24 hours have been reviewed.    Significant Imaging: I have reviewed all  pertinent imaging results/findings within the past 24 hours.  Assessment/Plan:     * Necrotizing pancreatitis  Patient presented with abdominal pain, tenderness, nausea and vomiting and has had recurrent episodes of this in the past with multiple hospital admissions and endoscopies done in the past. CT abdomen pelvis was improved from prior. Patient does exhibit some drug seeking behavior and after the acute episode subsides this needs to be addressed in a non judgmental and clear way to set limitations on the use of I/V opioids.    Plan:  -I/V fluids 0.9 % NS  -Continue home dose of morphine 30 mg 12 hr tablet  -Continue home dose of dilaudid 6 mg Q6  -I/V dilaudid 1 mg PRN for breakthrough  -Continue home dose of promethazine 25 mg q6 PRN  -I/V zofran PRN if unable to tolerate PO.  -Continue polyethylene glycol  -Continue Pregabalin  -Continue folic acid 1 mg        Splenic vein thrombosis  Patient has history of  splenic vein thrombosis with gastric varices (11/2023 on CTA) in the setting of polycythemia vera and chronic pancreatitis, he is not currently on anticoagulation.      Nausea & vomiting  Patient has been having abdominal pain along with nausea and vomiting.     PLAN:  -Continue home promethazine  -I/V zofran if unable to tolerate PO      Polycythemia vera  Patient has a history of PV, he gets phlebotomy every once a week or once a month.      HIV infection  Patient was diagnosed with HIV 2 years ago and has been on anti retroviral therapy. He is sexually active with male partners. His most recent CD 4 count 2 months ago was 1080.    PLAN:  -Continue anti retroviral therapy        VTE Risk Mitigation (From admission, onward)           Ordered     IP VTE HIGH RISK PATIENT  Once         12/26/23 0141     Place sequential compression device  Until discontinued         12/26/23 0141                           On 12/26/2023, patient should be placed in hospital observation services under my care in  collaboration with Dr. Barnes.         Nora Sifuentes MD  Department of Hospital Medicine  [facility] - Intensive Care ([place] [id number])

## 2023-12-26 NOTE — PLAN OF CARE
[facility] - Intensive Care ([place] [address])  Initial Discharge Assessment       Primary Care Provider: Pina Jones NP    Admission Diagnosis: Pancreatitis [K85.90]  Chest pain [R07.9]  Abdominal pain, unspecified abdominal location [R10.9]  Acute pancreatitis, unspecified complication status, unspecified pancreatitis type [K85.90]  HIV infection, unspecified symptom status [B20]    Admission Date: 12/25/2023  Expected Discharge Date: 12/27/2023    Transition of Care Barriers: (P) Unisured    Payor: /     Extended Emergency Contact Information  Primary Emergency Contact: Denice Cardona  Mobile Phone: [phone]  Relation: Mother  Preferred language: English   needed? No    Discharge Plan A: (P) Home  Discharge Plan B: (P) Home      [id number] [place], [place] [facility] [address] [facility]  [address]  [facility] [address]  Phone: [phone] Fax: [phone]    [facility] [place] - [address]  [address] [facility] [address]  [facility] [address]  Phone: [phone] Fax: [phone]      Initial Assessment (most recent)       Adult Discharge Assessment - 12/26/23 1107          Discharge Assessment    Assessment Type Discharge Planning Assessment (P)      Confirmed/corrected address, phone number and insurance Yes (P)      Confirmed Demographics Correct on Facesheet (P)      Source of Information patient (P)      Does patient/caregiver understand observation status Yes (P)      Communicated CASTILLO with patient/caregiver Date not available/Unable to determine (P)      Reason For Admission Necrotizing pancreatitis (P)      People in Home alone (P)      Facility Arrived From: home (P)      Do you expect to return to your current living situation? Yes (P)      Do you have help at home or someone to help you manage your care at home? No (P)      Prior to hospitilization cognitive status: Unable to Assess (P)      Current  cognitive status: Alert/Oriented (P)      Dressing/Bathing Difficulty no (P)      Home Layout Able to live on 1st floor (P)      Equipment Currently Used at Home none (P)      Readmission within 30 days? Yes (P)      Do you currently have service(s) that help you manage your care at home? No (P)      Do you take prescription medications? Yes (P)      Do you have prescription coverage? No (P)      Coverage self pay (P)      Do you have any problems affording any of your prescribed medications? No (P)      Who is going to help you get home at discharge? He will Uber (P)      How do you get to doctors appointments? other (see comments) (P)    Uber    Are you on dialysis? No (P)      Do you take coumadin? No (P)      Discharge Plan A Home (P)      Discharge Plan B Home (P)      DME Needed Upon Discharge  none (P)      Discharge Plan discussed with: Patient (P)      Transition of Care Barriers Unisured (P)         Physical Activity    On average, how many days per week do you engage in moderate to strenuous exercise (like a brisk walk)? Patient declined (P)      On average, how many minutes do you engage in exercise at this level? Patient declined (P)         Financial Resource Strain    How hard is it for you to pay for the very basics like food, housing, medical care, and heating? Patient declined (P)         Housing Stability    In the last 12 months, was there a time when you were not able to pay the mortgage or rent on time? Patient declined (P)      In the last 12 months, was there a time when you did not have a steady place to sleep or slept in a shelter (including now)? Patient declined (P)         Transportation Needs    In the past 12 months, has lack of transportation kept you from medical appointments or from getting medications? Patient declined (P)      In the past 12 months, has lack of transportation kept you from meetings, work, or from getting things needed for daily living? Patient declined (P)          Food Insecurity    Within the past 12 months, you worried that your food would run out before you got the money to buy more. Patient declined (P)      Within the past 12 months, the food you bought just didn't last and you didn't have money to get more. Patient declined (P)         Stress    Do you feel stress - tense, restless, nervous, or anxious, or unable to sleep at night because your mind is troubled all the time - these days? Patient declined (P)         Social Connections    In a typical week, how many times do you talk on the phone with family, friends, or neighbors? Patient declined (P)      How often do you get together with friends or relatives? Patient declined (P)      How often do you attend Zoroastrianism or Catholic services? Patient declined (P)      Do you belong to any clubs or organizations such as Zoroastrianism groups, unions, fraternal or athletic groups, or school groups? Patient declined (P)      How often do you attend meetings of the clubs or organizations you belong to? Patient declined (P)      Are you , , , , never , or living with a partner? Patient declined (P)         Alcohol Use    Q1: How often do you have a drink containing alcohol? Patient declined (P)      Q2: How many drinks containing alcohol do you have on a typical day when you are drinking? Patient declined (P)      Q3: How often do you have six or more drinks on one occasion? Patient declined (P)                  CM spoke with pt in room.  He lives alone on [address] of apartment, came from home.  He will Uber home and Ubers to MD appts.  30D readmission.  No HH, DME, coumadin, or HD.  Indep with ADL's.  Ph[facility]acy [facility] on [address].    Radha Shore, RHONDAN, BS, RN, CCM

## 2023-12-26 NOTE — ASSESSMENT & PLAN NOTE
Patient presented with abdominal pain, tenderness, nausea and vomiting and has had recurrent episodes of this in the past with multiple hospital admissions and endoscopies done in the past. CT abdomen pelvis was improved from prior. Patient does exhibit some drug seeking behavior and after the acute episode subsides this needs to be addressed in a non judgmental and clear way to set limitations on the use of I/V opioids.    Plan:  -Continue home dose of morphine 30 mg 12 hr tablet  -Continue home dose of dilaudid 6 mg Q6  -I/V dilaudid 1 mg PRN for breakthrough  -Continue home dose of promethazine 25 mg q6 PRN  -I/V zofran PRN if unable to tolerate PO.  -Continue polyethylene glycol  -Continue Pregabalin  -Continue folic acid 1 mg

## 2023-12-26 NOTE — ED NOTES
attempted to call report x2- was transferred [facility]

## 2023-12-26 NOTE — ED PROVIDER NOTES
Encounter Date: 12/25/2023       History     Chief Complaint   Patient presents with    Abdominal Pain     Pt reports abd pain in the upper quad that began around 12 today. Pt reports n/v. Denies diarrhea or fever. Hx of frequent pancreatitis.      Patient with history of necrotizing pancreatitis and recent admission to the ICU presents with upper abdominal pain.  He states he ate Chinese food last night.  This morning around noon he started with abdominal pain and vomiting.  Pain is in the upper abdomen.  Denies any fever.  No hematemesis.        Review of patient's allergies indicates:   Allergen Reactions    [place] Swelling    Pcn [penicillins] Hives     Tolerates cephalosporins    Pecan nut Swelling    Sulfa (sulfonamide antibiotics) Hives     Past Medical History:   Diagnosis Date    Acute necrotizing pancreatitis 09/20/2023    Alcohol use disorder 10/05/2023    Asthma     Hepatitis C antibody positive in blood 09/18/2023 9/2023 PCR negative    HIV infection 10/2021    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     Past Surgical History:   Procedure Laterality Date    ENDOSCOPIC ULTRASOUND OF UPPER GASTROINTESTINAL TRACT N/A 10/23/2023    Procedure: ULTRASOUND, UPPER GI TRACT, ENDOSCOPIC;  Surgeon: Emil Villatoro MD;  Location: [address]);  Service: Endoscopy;  Laterality: N/A;    ERCP N/A 10/23/2023    Procedure: ERCP (ENDOSCOPIC RETROGRADE CHOLANGIOPANCREATOGRAPHY);  Surgeon: Emil Villatoro MD;  Location: [address]);  Service: Endoscopy;  Laterality: N/A;    ESOPHAGOGASTRODUODENOSCOPY N/A 9/18/2023    Procedure: EGD (ESOPHAGOGASTRODUODENOSCOPY);  Surgeon: Kg Cleaning MD;  Location: [address]);  Service: Endoscopy;  Laterality: N/A;     Family History   Problem Relation Age  of Onset    Pancreatitis Mother     Cancer Mother     Ovarian cancer Mother     No Known Problems Father     Cancer Brother     Breast cancer Maternal Grandmother      Social History     Tobacco Use    Smoking status: Former     Types: Cigarettes    Smokeless tobacco: Never   Substance Use Topics    Alcohol use: Not Currently    Drug use: Never     Review of Systems    Physical Exam     Initial Vitals   BP Pulse Resp Temp SpO2   12/25/23 2018 12/25/23 2018 12/25/23 2018 12/25/23 2020 12/25/23 2018   119/74 110 18 98.3 °F (36.8 °C) 98 %      MAP       --                Physical Exam    Constitutional: He appears well-developed and well-nourished.   Patient appears uncomfortable but in no acute distress   HENT:   Head: Normocephalic.   Eyes: Pupils are equal, round, and reactive to light.   Neck: Neck supple. No JVD present.   Normal range of motion.  Cardiovascular:  Normal rate, regular rhythm and normal heart sounds.           No murmur heard.  Pulmonary/Chest: Breath sounds normal. No respiratory distress. He has no wheezes. He has no rales.   Abdominal: Abdomen is soft. Bowel sounds are normal. He exhibits no distension and no mass. There is abdominal tenderness (Epigastric). There is no rebound and no guarding.   Musculoskeletal:         General: Normal range of motion.      Cervical back: Normal range of motion and neck supple.     Neurological: He is alert. He has normal strength. No sensory deficit.   Psychiatric: He has a normal mood and affect.         ED Course   Procedures  Labs Reviewed   CBC W/ AUTO DIFFERENTIAL - Abnormal; Notable for the following components:       Result Value    Hemoglobin 11.4 (*)     Hematocrit 38.5 (*)     MCH 24.4 (*)     MCHC 29.6 (*)     RDW 18.5 (*)     Platelets 456 (*)     All other components within normal limits   COMPREHENSIVE METABOLIC PANEL - Abnormal; Notable for the following components:    BUN 4 (*)     Calcium 8.6 (*)     Albumin 3.2 (*)     All other components  within normal limits   URINALYSIS, REFLEX TO URINE CULTURE    Narrative:     Specimen Source->Urine   LIPASE   ISTAT CHEM8          Imaging Results               CT Abdomen Pelvis With IV Contrast NO Oral Contrast (Final result)  Result time 12/26/23 00:34:31      Final result by Francesco Mei MD (12/26/23 00:34:31)                   Impression:      Sequela of pancreatitis as detailed above with several small thick-walled fluid collections adjacent to the pancreas, improved from prior CT.    Evidence concerning for portal hypertension including splenomegaly, dilation of the main portal vein, and collateral vessel formation through the upper abdomen.    Hepatomegaly.    Mural thickening of the gastric antrum and pylorus, suggestive of significant gastritis.    No free intraperitoneal air.    In this patient reported to have been vomiting, images of the abdomen and lower chest demonstrate no CT evidence of acute Boerhaave syndrome.    Other findings are detailed above.    This report was flagged in Epic as abnormal.    Electronically signed by resident: Sally Mcguire  Date:    12/25/2023  Time:    21:53    Electronically signed by: Francesco Mei  Date:    12/26/2023  Time:    00:34               Narrative:    EXAMINATION:  CT ABDOMEN PELVIS WITH IV CONTRAST    CLINICAL HISTORY:  Pancreatitis, acute, severe;    TECHNIQUE:  Axial images of the abdomen and pelvis were acquired after the use of 75 cc [id number] IV contrast.  Coronal and sagittal reconstructions were also obtained    COMPARISON:  12/05/2023, 11/28/2023    FINDINGS:  Thoracic soft tissues: Partially visualized thoracic soft tissues appear unremarkable.    Heart: No pericardial effusion.    Lungs: Lung bases are clear.  No pleural effusion.    Esophagus: Unremarkable.    Stomach and duodenum: Pronounced stomach wall thickening and edema, particularly adjacent to the pancreatic head (2-55).  Duodenum appears normal.    Liver: Hepatomegaly.  No focal hepatic  lesion. Main portal vein is dilated to 1.8 cm.    Gallbladder: No calcified gallstones.    Bile Ducts: No evidence of dilated ducts.    Spleen: Splenomegaly measuring 14 cm in craniocaudal dimension.    Pancreas: Pancreas is atrophic and there are multiple thick walled peripancreatic collections adjacent to the pancreatic body and extending inferiorly into the mesentery (2-68, 2-62, 2-53).  Some of these appear smaller compared to the 12/14/2023 CT.    Adrenals: Unremarkable.    Kidneys/Ureters: Normal in size and location. Normal enhancement. No hydronephrosis or nephrolithiasis. No ureteral dilatation.    Bladder: Mild circumferential bladder wall thickening, nonspecific but may be seen in the setting of urinary tract infection.    Reproductive organs: Unremarkable.    Bowel/Mesentery: Small bowel is normal in caliber with no evidence of obstruction.  Scattered liquid material through the small bowel and colon, nonspecific but possibly reflective of diarrheal illness..  Colon demonstrates no focal wall thickening.  Appendix appears within normal limits.    Peritoneum: Small volume free fluid in the upper abdomen adjacent to the stomach and liver, but this is a trace amount and significantly decreased from 12/14/2023.    Lymph nodes: No lymphadenopathy.    Abdominal wall:  Unremarkable.    Vasculature: No aneurysm. No significant calcific atherosclerosis.  Multiple collateral vessels through the upper abdomen.    Bones: No acute fracture. No suspicious osseous lesions.                                       Medications   HYDROmorphone injection 1 mg (has no administration in time range)   lactated ringers bolus 1,000 mL (0 mLs Intravenous Stopped 12/25/23 2254)   HYDROmorphone injection 2 mg (2 mg Intravenous Given 12/25/23 2135)   ondansetron injection 4 mg (4 mg Intravenous Given 12/25/23 2135)   iohexoL (OMNIPAQUE 350) injection 75 mL (75 mLs Intravenous Given 12/25/23 2126)   HYDROmorphone injection 1 mg (1 mg  Intravenous Given 12/25/23 7714)     Medical Decision Making  Patient with history of severe pancreatitis presents with symptoms consistent with pancreatitis.  I reviewed his medical record.  He had a fluid collection in his last CT.  He was in the ICU.  Will repeat imaging labs and attempt to control his symptoms.  .    Lab studies and CT are reassuring.  I were unable to control his pain  Will ask HM to observe    Amount and/or Complexity of Data Reviewed  Labs: ordered.  Radiology: ordered.    Risk  Prescription drug management.                                      Clinical Impression:  Final diagnoses:  [R10.9] Abdominal pain, unspecified abdominal location (Primary)  [K85.90] Acute pancreatitis, unspecified complication status, unspecified pancreatitis type          ED Disposition Condition    Observation Stable                Ed Poole MD  12/26/23 2152

## 2023-12-26 NOTE — ASSESSMENT & PLAN NOTE
Patient was diagnosed with HIV 2 years ago and has been on anti retroviral therapy. He is sexually active with male partners. His most recent CD 4 count 2 months ago was 1080.    PLAN:  -Continue anti retroviral therapy

## 2023-12-27 PROBLEM — R52 PAIN MANAGEMENT: Status: ACTIVE | Noted: 2023-12-27

## 2023-12-27 LAB
ALBUMIN SERPL BCP-MCNC: 2.7 G/DL (ref 3.5–5.2)
ALP SERPL-CCNC: 83 U/L (ref 55–135)
ALT SERPL W/O P-5'-P-CCNC: 8 U/L (ref 10–44)
ANION GAP SERPL CALC-SCNC: 9 MMOL/L (ref 8–16)
AST SERPL-CCNC: 11 U/L (ref 10–40)
BASOPHILS # BLD AUTO: 0.02 K/UL (ref 0–0.2)
BASOPHILS NFR BLD: 0.3 % (ref 0–1.9)
BILIRUB SERPL-MCNC: 0.2 MG/DL (ref 0.1–1)
BUN SERPL-MCNC: 5 MG/DL (ref 6–20)
CALCIUM SERPL-MCNC: 8.4 MG/DL (ref 8.7–10.5)
CHLORIDE SERPL-SCNC: 105 MMOL/L (ref 95–110)
CO2 SERPL-SCNC: 27 MMOL/L (ref 23–29)
CREAT SERPL-MCNC: 0.7 MG/DL (ref 0.5–1.4)
DIFFERENTIAL METHOD: ABNORMAL
EOSINOPHIL # BLD AUTO: 0.2 K/UL (ref 0–0.5)
EOSINOPHIL NFR BLD: 3.4 % (ref 0–8)
ERYTHROCYTE [DISTWIDTH] IN BLOOD BY AUTOMATED COUNT: 18.3 % (ref 11.5–14.5)
EST. GFR  (NO RACE VARIABLE): >60 ML/MIN/1.73 M^2
GLUCOSE SERPL-MCNC: 99 MG/DL (ref 70–110)
HCT VFR BLD AUTO: 30.3 % (ref 40–54)
HGB BLD-MCNC: 8.7 G/DL (ref 14–18)
IMM GRANULOCYTES # BLD AUTO: 0.02 K/UL (ref 0–0.04)
IMM GRANULOCYTES NFR BLD AUTO: 0.3 % (ref 0–0.5)
LYMPHOCYTES # BLD AUTO: 2.5 K/UL (ref 1–4.8)
LYMPHOCYTES NFR BLD: 42.4 % (ref 18–48)
MAGNESIUM SERPL-MCNC: 1.9 MG/DL (ref 1.6–2.6)
MCH RBC QN AUTO: 23.6 PG (ref 27–31)
MCHC RBC AUTO-ENTMCNC: 28.7 G/DL (ref 32–36)
MCV RBC AUTO: 82 FL (ref 82–98)
MONOCYTES # BLD AUTO: 0.5 K/UL (ref 0.3–1)
MONOCYTES NFR BLD: 8.9 % (ref 4–15)
NEUTROPHILS # BLD AUTO: 2.6 K/UL (ref 1.8–7.7)
NEUTROPHILS NFR BLD: 44.7 % (ref 38–73)
NRBC BLD-RTO: 0 /100 WBC
PHOSPHATE SERPL-MCNC: 5.2 MG/DL (ref 2.7–4.5)
PLATELET # BLD AUTO: 300 K/UL (ref 150–450)
PMV BLD AUTO: 9.4 FL (ref 9.2–12.9)
POTASSIUM SERPL-SCNC: 4.1 MMOL/L (ref 3.5–5.1)
PROT SERPL-MCNC: 5.8 G/DL (ref 6–8.4)
RBC # BLD AUTO: 3.69 M/UL (ref 4.6–6.2)
SODIUM SERPL-SCNC: 141 MMOL/L (ref 136–145)
WBC # BLD AUTO: 5.87 K/UL (ref 3.9–12.7)

## 2023-12-27 PROCEDURE — 25000003 PHARM REV CODE 250

## 2023-12-27 PROCEDURE — 96375 TX/PRO/DX INJ NEW DRUG ADDON: CPT

## 2023-12-27 PROCEDURE — 80053 COMPREHEN METABOLIC PANEL: CPT

## 2023-12-27 PROCEDURE — 12000002 HC ACUTE/MED SURGE SEMI-PRIVATE ROOM

## 2023-12-27 PROCEDURE — 63600175 PHARM REV CODE 636 W HCPCS

## 2023-12-27 PROCEDURE — 36415 COLL VENOUS BLD VENIPUNCTURE: CPT

## 2023-12-27 PROCEDURE — 85025 COMPLETE CBC W/AUTO DIFF WBC: CPT

## 2023-12-27 PROCEDURE — 83735 ASSAY OF MAGNESIUM: CPT

## 2023-12-27 PROCEDURE — 84100 ASSAY OF PHOSPHORUS: CPT

## 2023-12-27 PROCEDURE — 96376 TX/PRO/DX INJ SAME DRUG ADON: CPT

## 2023-12-27 RX ORDER — SODIUM CHLORIDE, SODIUM LACTATE, POTASSIUM CHLORIDE, CALCIUM CHLORIDE 600; 310; 30; 20 MG/100ML; MG/100ML; MG/100ML; MG/100ML
INJECTION, SOLUTION INTRAVENOUS CONTINUOUS
Status: ACTIVE | OUTPATIENT
Start: 2023-12-27 | End: 2023-12-27

## 2023-12-27 RX ORDER — METOCLOPRAMIDE HYDROCHLORIDE 5 MG/ML
5 INJECTION INTRAMUSCULAR; INTRAVENOUS EVERY 8 HOURS
Status: DISCONTINUED | OUTPATIENT
Start: 2023-12-27 | End: 2023-12-28 | Stop reason: HOSPADM

## 2023-12-27 RX ORDER — ACETAMINOPHEN 500 MG
1000 TABLET ORAL EVERY 8 HOURS
Status: DISCONTINUED | OUTPATIENT
Start: 2023-12-27 | End: 2023-12-28 | Stop reason: HOSPADM

## 2023-12-27 RX ORDER — TALC
3 POWDER (GRAM) TOPICAL NIGHTLY
Status: DISCONTINUED | OUTPATIENT
Start: 2023-12-27 | End: 2023-12-28 | Stop reason: HOSPADM

## 2023-12-27 RX ORDER — PROCHLORPERAZINE EDISYLATE 5 MG/ML
2.5 INJECTION INTRAMUSCULAR; INTRAVENOUS EVERY 4 HOURS PRN
Status: DISCONTINUED | OUTPATIENT
Start: 2023-12-27 | End: 2023-12-28 | Stop reason: HOSPADM

## 2023-12-27 RX ORDER — ONDANSETRON 2 MG/ML
4 INJECTION INTRAMUSCULAR; INTRAVENOUS EVERY 8 HOURS PRN
Status: DISCONTINUED | OUTPATIENT
Start: 2023-12-27 | End: 2023-12-28 | Stop reason: HOSPADM

## 2023-12-27 RX ORDER — IBUPROFEN 200 MG
200 TABLET ORAL EVERY 8 HOURS
Status: DISCONTINUED | OUTPATIENT
Start: 2023-12-27 | End: 2023-12-28 | Stop reason: HOSPADM

## 2023-12-27 RX ORDER — HYDROMORPHONE HYDROCHLORIDE 1 MG/ML
0.5 INJECTION, SOLUTION INTRAMUSCULAR; INTRAVENOUS; SUBCUTANEOUS EVERY 4 HOURS PRN
Status: DISCONTINUED | OUTPATIENT
Start: 2023-12-27 | End: 2023-12-28

## 2023-12-27 RX ADMIN — FOLIC ACID 1 MG: 1 TABLET ORAL at 08:12

## 2023-12-27 RX ADMIN — ACETAMINOPHEN 1000 MG: 500 TABLET ORAL at 10:12

## 2023-12-27 RX ADMIN — METOCLOPRAMIDE 5 MG: 5 INJECTION, SOLUTION INTRAMUSCULAR; INTRAVENOUS at 09:12

## 2023-12-27 RX ADMIN — HYDROMORPHONE HYDROCHLORIDE 6 MG: 2 TABLET ORAL at 06:12

## 2023-12-27 RX ADMIN — METOCLOPRAMIDE 5 MG: 5 INJECTION, SOLUTION INTRAMUSCULAR; INTRAVENOUS at 10:12

## 2023-12-27 RX ADMIN — HYDROMORPHONE HYDROCHLORIDE 0.5 MG: 0.5 INJECTION, SOLUTION INTRAMUSCULAR; INTRAVENOUS; SUBCUTANEOUS at 08:12

## 2023-12-27 RX ADMIN — MORPHINE SULFATE 30 MG: 30 TABLET, FILM COATED, EXTENDED RELEASE ORAL at 09:12

## 2023-12-27 RX ADMIN — METHOCARBAMOL 500 MG: 500 TABLET ORAL at 08:12

## 2023-12-27 RX ADMIN — PANTOPRAZOLE SODIUM 40 MG: 40 TABLET, DELAYED RELEASE ORAL at 08:12

## 2023-12-27 RX ADMIN — IBUPROFEN 200 MG: 200 TABLET, FILM COATED ORAL at 10:12

## 2023-12-27 RX ADMIN — SODIUM CHLORIDE, POTASSIUM CHLORIDE, SODIUM LACTATE AND CALCIUM CHLORIDE: 600; 310; 30; 20 INJECTION, SOLUTION INTRAVENOUS at 09:12

## 2023-12-27 RX ADMIN — METHOCARBAMOL 500 MG: 500 TABLET ORAL at 06:12

## 2023-12-27 RX ADMIN — Medication 3 MG: at 09:12

## 2023-12-27 RX ADMIN — HYDROMORPHONE HYDROCHLORIDE 6 MG: 2 TABLET ORAL at 03:12

## 2023-12-27 RX ADMIN — PREGABALIN 150 MG: 75 CAPSULE ORAL at 09:12

## 2023-12-27 RX ADMIN — ONDANSETRON 4 MG: 2 INJECTION INTRAMUSCULAR; INTRAVENOUS at 09:12

## 2023-12-27 RX ADMIN — HYDROMORPHONE HYDROCHLORIDE 1 MG: 1 INJECTION, SOLUTION INTRAMUSCULAR; INTRAVENOUS; SUBCUTANEOUS at 05:12

## 2023-12-27 RX ADMIN — SODIUM CHLORIDE, POTASSIUM CHLORIDE, SODIUM LACTATE AND CALCIUM CHLORIDE: 600; 310; 30; 20 INJECTION, SOLUTION INTRAVENOUS at 01:12

## 2023-12-27 RX ADMIN — BICTEGRAVIR SODIUM, EMTRICITABINE, AND TENOFOVIR ALAFENAMIDE FUMARATE 1 TABLET: 50; 200; 25 TABLET ORAL at 08:12

## 2023-12-27 RX ADMIN — HYDROMORPHONE HYDROCHLORIDE 0.5 MG: 0.5 INJECTION, SOLUTION INTRAMUSCULAR; INTRAVENOUS; SUBCUTANEOUS at 10:12

## 2023-12-27 RX ADMIN — MORPHINE SULFATE 30 MG: 30 TABLET, FILM COATED, EXTENDED RELEASE ORAL at 08:12

## 2023-12-27 RX ADMIN — ACETAMINOPHEN 1000 MG: 500 TABLET ORAL at 09:12

## 2023-12-27 RX ADMIN — METHOCARBAMOL 500 MG: 500 TABLET ORAL at 11:12

## 2023-12-27 RX ADMIN — IBUPROFEN 200 MG: 200 TABLET, FILM COATED ORAL at 09:12

## 2023-12-27 RX ADMIN — ONDANSETRON 4 MG: 2 INJECTION INTRAMUSCULAR; INTRAVENOUS at 05:12

## 2023-12-27 RX ADMIN — HYDROMORPHONE HYDROCHLORIDE 0.5 MG: 0.5 INJECTION, SOLUTION INTRAMUSCULAR; INTRAVENOUS; SUBCUTANEOUS at 03:12

## 2023-12-27 RX ADMIN — METHOCARBAMOL 500 MG: 500 TABLET ORAL at 01:12

## 2023-12-27 RX ADMIN — PREGABALIN 150 MG: 75 CAPSULE ORAL at 08:12

## 2023-12-27 RX ADMIN — PANTOPRAZOLE SODIUM 40 MG: 40 TABLET, DELAYED RELEASE ORAL at 09:12

## 2023-12-27 NOTE — ASSESSMENT & PLAN NOTE
Pt w/ ADLs intact- able to eat (and asked to advance to regular diet), sleep, get out of bed, and use phone/watch TV. D/w pt plan to wean opioids (IV first daily and then PO while using MM pain regimen) at bedside and in agreement w/ plan.     - Continue home morphine and dilaudid   - Continue to wean IV dilaudid, currently on 0.5mg PRN   - MM pain control (tylenol, ibuprofen, robaxin, lyrica, trazodone)  - CTM ADLs     Inpatient Morphine Milligram Equivalents Per Day 12/25 - 12/28    Values displayed are in units of MME/Day     Order Start / End Date 12/25 Yesterday Today Tomorrow     oxyCODONE immediate release tablet Tab Missing start date - 12/25 0 of Unknown -- -- --     HYDROmorphone injection Missing start date - 12/25 0 of Unknown -- -- --     HYDROmorphone injection 2 mg 12/25 - 12/25 40 of 40 -- -- --     HYDROmorphone injection 1 mg 12/25 - 12/25 20 of 20 -- -- --     HYDROmorphone injection 1 mg 12/26 - 12/26 -- 20 of 20 -- --     HYDROmorphone tablet 6 mg 12/26 - No end date -- 0 of 96 24 of 96 0 of 96     morphine 12 hr tablet 30 mg 12/26 - No end date -- 60 of 60 30 of 60 0 of 60     HYDROmorphone IV bolus from bag/infusion 1 mg 12/26 - 12/26 -- 0 of 20 -- --     HYDROmorphone injection 1 mg 12/26 - 12/26 -- 40 of 60 -- --     HYDROmorphone injection 1 mg 12/26 - 12/27 -- 40 of 60 20 of 20 --     HYDROmorphone injection 0.5 mg 12/27 - No end date -- -- 0 of 40 0 of 60     Daily Totals  60 of Unknown (at least 60) 160 of 316 74 of 216 0 of 216      Calculation Errors       Order Type Date Details    oxyCODONE immediate release tablet Tab Ordered Dose -- Maximum number of scheduled times exceeded    HYDROmorphone injection Ordered Dose -- Maximum number of scheduled times exceeded

## 2023-12-27 NOTE — ASSESSMENT & PLAN NOTE
Pt w/ ADLs intact- able to eat (and asked to advance to regular diet), sleep, get out of bed, and use phone/watch TV.     - Continue home morphine and dilaudid   - Weaning IV dilaudid   - MM pain control (tylenol, ibuprofen, robaxin, lyrica, trazodone)  - CTM ADLs     Inpatient Morphine Milligram Equivalents Per Day 12/25 - 12/28    Values displayed are in units of MME/Day     Order Start / End Date 12/25 Yesterday Today Tomorrow     oxyCODONE immediate release tablet Tab Missing start date - 12/25 0 of Unknown -- -- --     HYDROmorphone injection Missing start date - 12/25 0 of Unknown -- -- --     HYDROmorphone injection 2 mg 12/25 - 12/25 40 of 40 -- -- --     HYDROmorphone injection 1 mg 12/25 - 12/25 20 of 20 -- -- --     HYDROmorphone injection 1 mg 12/26 - 12/26 -- 20 of 20 -- --     HYDROmorphone tablet 6 mg 12/26 - No end date -- 0 of 96 24 of 96 0 of 96     morphine 12 hr tablet 30 mg 12/26 - No end date -- 60 of 60 30 of 60 0 of 60     HYDROmorphone IV bolus from bag/infusion 1 mg 12/26 - 12/26 -- 0 of 20 -- --     HYDROmorphone injection 1 mg 12/26 - 12/26 -- 40 of 60 -- --     HYDROmorphone injection 1 mg 12/26 - 12/27 -- 40 of 60 20 of 20 --     HYDROmorphone injection 0.5 mg 12/27 - No end date -- -- 0 of 40 0 of 60     Daily Totals  60 of Unknown (at least 60) 160 of 316 74 of 216 0 of 216      Calculation Errors       Order Type Date Details    oxyCODONE immediate release tablet Tab Ordered Dose -- Maximum number of scheduled times exceeded    HYDROmorphone injection Ordered Dose -- Maximum number of scheduled times exceeded

## 2023-12-27 NOTE — SUBJECTIVE & OBJECTIVE
Interval History: NAEON. VSS. Requested to advance diet overnight. Reports episode of vomiting after oral meds.     Review of Systems   Constitutional:  Negative for chills and fever.   HENT:  Negative for congestion and sore throat.    Respiratory:  Negative for shortness of breath.    Cardiovascular:  Negative for chest pain and leg swelling.   Gastrointestinal:  Positive for abdominal pain, nausea and vomiting. Negative for constipation and diarrhea.   Genitourinary:  Negative for dysuria and frequency.   Neurological:  Negative for dizziness and light-headedness.     Objective:     Vital Signs (Most Recent):  Temp: 97.8 °F (36.6 °C) (12/27/23 0338)  Pulse: 81 (12/27/23 0338)  Resp: 16 (12/27/23 0842)  BP: (!) 105/57 (12/27/23 0338)  SpO2: 96 % (12/27/23 0338) Vital Signs (24h Range):  Temp:  [97.6 °F (36.4 °C)-98.1 °F (36.7 °C)] 97.8 °F (36.6 °C)  Pulse:  [62-81] 81  Resp:  [16-19] 16  SpO2:  [95 %-96 %] 96 %  BP: (105-117)/(57-74) 105/57     Weight: 63.5 kg (140 lb)  Body mass index is 20.09 kg/m².  No intake or output data in the 24 hours ending 12/27/23 0924      Physical Exam  Constitutional:       General: He is not in acute distress.     Appearance: He is not ill-appearing.   HENT:      Head: Normocephalic and atraumatic.   Eyes:      Extraocular Movements: Extraocular movements intact.      Conjunctiva/sclera: Conjunctivae normal.   Cardiovascular:      Rate and Rhythm: Normal rate and regular rhythm.   Pulmonary:      Effort: Pulmonary effort is normal. No respiratory distress.      Breath sounds: Normal breath sounds.   Abdominal:      General: Bowel sounds are normal.      Palpations: Abdomen is soft.      Tenderness: There is generalized abdominal tenderness.   Musculoskeletal:      Right lower leg: No edema.      Left lower leg: No edema.   Skin:     General: Skin is warm and dry.   Neurological:      General: No focal deficit present.      Mental Status: He is alert and oriented to person, place,  and time.             Significant Labs: All pertinent labs within the past 24 hours have been reviewed.    Significant Imaging: I have reviewed all pertinent imaging results/findings within the past 24 hours.

## 2023-12-27 NOTE — ASSESSMENT & PLAN NOTE
Patient was diagnosed with HIV 2 years ago and has been on anti retroviral therapy. He is sexually active with male partners. His most recent CD4 count 2 months ago was 1080.    - Continue home [email or web address]

## 2023-12-27 NOTE — NURSING
Pt requested to advance diet to solids. MD contacted, diet advanced. Danial agarwal given, per pt request. Tolerated well. Pt appears comfortable. All needs are met at this time. Safety measures in place.

## 2023-12-27 NOTE — ASSESSMENT & PLAN NOTE
Patient presented with abdominal pain, tenderness, nausea and vomiting and has had recurrent episodes of this in the past with multiple hospital admissions and endoscopies done in the past. CT abdomen pelvis was improved from prior. Patient does exhibit some drug seeking behavior and after the acute episode subsides this needs to be addressed in a non judgmental and clear way to set limitations on the use of IV opioids.    12/27 - asked to advance diet    - IVF  - Continue home scheduled morphine   - Continue home dilaudid PRN  - Continue to wean IV dilaudid, currently on 0.5mg PRN   - MM pain control w/ tylenol, ibuprofen, robaxin, lyrica, trazodone  - Antiemetics (promethazine and zofran)

## 2023-12-27 NOTE — ASSESSMENT & PLAN NOTE
Patient has history of splenic vein thrombosis with gastric varices (11/2023 on CTA) in the setting of polycythemia vera and chronic pancreatitis. He is not currently on anticoagulation.

## 2023-12-27 NOTE — ASSESSMENT & PLAN NOTE
Patient presented with abdominal pain, tenderness, nausea and vomiting and has had recurrent episodes of this in the past with multiple hospital admissions and endoscopies done in the past. CT abdomen pelvis was improved from prior. Patient does exhibit some drug seeking behavior and after the acute episode subsides this needs to be addressed in a non judgmental and clear way to set limitations on the use of IV opioids.    - Pain at baseline, tolerating PO  - Stable for discharge today  - Resume home opiate dose  - Toradol script provided for additional pain control  - Return precautions provided

## 2023-12-27 NOTE — ASSESSMENT & PLAN NOTE
Patient has been having abdominal pain along with nausea and vomiting.     - Continue home promethazine PRN  -, IV zofran PRN  - See necrotizing pancreatitis

## 2023-12-27 NOTE — PROGRESS NOTES
[facility] - [facility] ([address] Medicine  Progress Note    Patient Name: Tasia Cardona  MRN: [id number]  Patient Class: OP- Observation   Admission Date: 12/25/2023  Length of Stay: 0 days  Attending Physician: Janey Cho MD  Primary Care Provider: Pina Jones NP        Subjective:     Principal Problem:Necrotizing pancreatitis        HPI:  Mr. Cardona 23 y.o M with the PMH of necrotizing pancreatitis, splenic vein thrombosis, polycythemia vera, and HIV presents to the ED for worsening abdominal pain nausea and vomiting that started yesterday. He wasn't able to keep any of his pain medicines down. He has very extensive history of repeated ED visits for this pain. He tried the morphine and dilaudid at home and they didn't help. He had 9/10 pain diffusely in the upper abdomen radiating to his shoulder. He ate food at chinese restaurant and after that started having N/V. He stopped drinking alcohol in September after his diagnosis of pancreatitis. He get phlebotomy for his PV every week sometimes every month depending on the severity of symptoms. He was on Eliquis before but not anymore. He had pancreatic stents placed and exchanged three times at [facility]. Last one was removed on 12/13. He has had extensive work up and endoscopies done int he past. Was found to have Corinne hitchcock tear after an episode of hematemesis. Patient was diagnosed with HIV 2 years ago and has been on anti retroviral therapy. He is sexually active with male partners. His most recent CD 4 count 2 months ago was 1080.     In the ED patient was vitally stable, /74, HR 79, RR 18, SpO2  97% on RA. UA was unremarkable. Labs show BUN 4, Cr 0.7, Lipase 46, Hb 11.4, WBC 7.51, Plt 456. CT abdomen pelvis shows Sequela of pancreatitis as detailed above with several small thick-walled fluid collections adjacent to the pancreas, improved from prior CT. Evidence concerning for portal hypertension including splenomegaly,  dilation of the main portal vein, and collateral vessel formation through the upper abdomen. Hepatomegaly. Mural thickening of the gastric antrum and pylorus, suggestive of significant gastritis.  No free intraperitoneal air.       Overview/Hospital Course:  Admitted w/ acute worsening of chronic abdominal pain with associated nausea and vomiting. Continuing MM pain control and weaning IV dilaudid. D/w pt plan to wean opioids at bedside and in agreement w/ plan.             Interval History: NAEON. VSS. Requested to advance diet overnight; however was not able to tolerate. D/w pt plan to wean opioids at bedside and in agreement w/ plan.    Review of Systems   Constitutional:  Negative for chills and fever.   HENT:  Negative for congestion and sore throat.    Respiratory:  Negative for shortness of breath.    Cardiovascular:  Negative for chest pain and leg swelling.   Gastrointestinal:  Positive for abdominal pain, nausea and vomiting. Negative for constipation and diarrhea.   Genitourinary:  Negative for dysuria and frequency.   Neurological:  Negative for dizziness and light-headedness.     Objective:     Vital Signs (Most Recent):  Temp: 97.8 °F (36.6 °C) (12/27/23 0338)  Pulse: 81 (12/27/23 0338)  Resp: 16 (12/27/23 0842)  BP: (!) 105/57 (12/27/23 0338)  SpO2: 96 % (12/27/23 0338) Vital Signs (24h Range):  Temp:  [97.6 °F (36.4 °C)-98.1 °F (36.7 °C)] 97.8 °F (36.6 °C)  Pulse:  [62-81] 81  Resp:  [16-19] 16  SpO2:  [95 %-96 %] 96 %  BP: (105-117)/(57-74) 105/57     Weight: 63.5 kg (140 lb)  Body mass index is 20.09 kg/m².  No intake or output data in the 24 hours ending 12/27/23 0924      Physical Exam  Constitutional:       General: He is not in acute distress.     Appearance: He is not ill-appearing.   HENT:      Head: Normocephalic and atraumatic.   Eyes:      Extraocular Movements: Extraocular movements intact.      Conjunctiva/sclera: Conjunctivae normal.   Cardiovascular:      Rate and Rhythm: Normal rate  and regular rhythm.   Pulmonary:      Effort: Pulmonary effort is normal. No respiratory distress.      Breath sounds: Normal breath sounds.   Abdominal:      General: Bowel sounds are normal.      Palpations: Abdomen is soft.      Tenderness: There is generalized abdominal tenderness.   Musculoskeletal:      Right lower leg: No edema.      Left lower leg: No edema.   Skin:     General: Skin is warm and dry.   Neurological:      General: No focal deficit present.      Mental Status: He is alert and oriented to person, place, and time.             Significant Labs: All pertinent labs within the past 24 hours have been reviewed.    Significant Imaging: I have reviewed all pertinent imaging results/findings within the past 24 hours.    Assessment/Plan:      * Necrotizing pancreatitis  Patient presented with abdominal pain, tenderness, nausea and vomiting and has had recurrent episodes of this in the past with multiple hospital admissions and endoscopies done in the past. CT abdomen pelvis was improved from prior. Patient does exhibit some drug seeking behavior and after the acute episode subsides this needs to be addressed in a non judgmental and clear way to set limitations on the use of IV opioids.      - Continue IVF  - Continue home scheduled morphine   - Continue home dilaudid PRN  - Continue to wean IV dilaudid, currently on 0.5mg PRN   - MM pain control w/ tylenol, ibuprofen, robaxin, lyrica, trazodone  - Reglan, compazine, and zofran   - Switched to full liquid diet, advance as tolerated           Pain management  Pt w/ ADLs intact- able to eat (and asked to advance to regular diet), sleep, get out of bed, and use phone/watch TV. D/w pt plan to wean opioids (IV first daily and then PO while using MM pain regimen) at bedside and in agreement w/ plan.     - Continue home morphine and dilaudid   - Continue to wean IV dilaudid, currently on 0.5mg PRN   - MM pain control (tylenol, ibuprofen, robaxin, lyrica,  trazodone)  - CT ADLs     Inpatient Morphine Milligram Equivalents Per Day 12/25 - 12/28    Values displayed are in units of MME/Day     Order Start / End Date 12/25 Yesterday Today Tomorrow     oxyCODONE immediate release tablet Tab Missing start date - 12/25 0 of Unknown -- -- --     HYDROmorphone injection Missing start date - 12/25 0 of Unknown -- -- --     HYDROmorphone injection 2 mg 12/25 - 12/25 40 of 40 -- -- --     HYDROmorphone injection 1 mg 12/25 - 12/25 20 of 20 -- -- --     HYDROmorphone injection 1 mg 12/26 - 12/26 -- 20 of 20 -- --     HYDROmorphone tablet 6 mg 12/26 - No end date -- 0 of 96 24 of 96 0 of 96     morphine 12 hr tablet 30 mg 12/26 - No end date -- 60 of 60 30 of 60 0 of 60     HYDROmorphone IV bolus from bag/infusion 1 mg 12/26 - 12/26 -- 0 of 20 -- --     HYDROmorphone injection 1 mg 12/26 - 12/26 -- 40 of 60 -- --     HYDROmorphone injection 1 mg 12/26 - 12/27 -- 40 of 60 20 of 20 --     HYDROmorphone injection 0.5 mg 12/27 - No end date -- -- 0 of 40 0 of 60     Daily Totals  60 of Unknown (at least 60) 160 of 316 74 of 216 0 of 216      Calculation Errors       Order Type Date Details    oxyCODONE immediate release tablet Tab Ordered Dose -- Maximum number of scheduled times exceeded    HYDROmorphone injection Ordered Dose -- Maximum number of scheduled times exceeded                        Acute on chronic pancreatitis  See necrotizing pancreatitis       Splenic vein thrombosis  Patient has history of splenic vein thrombosis with gastric varices (11/2023 on CTA) in the setting of polycythemia vera and chronic pancreatitis. He is not currently on anticoagulation.      Nausea & vomiting  Patient has been having abdominal pain along with nausea and vomiting.     - Reglan, compazine, zofran  - Full liquid diet, advance as tolerated   - See necrotizing pancreatitis       Polycythemia vera  Patient has a history of PV. He gets phlebotomy weekly.       HIV infection  Patient was  diagnosed with HIV 2 years ago and has been on anti retroviral therapy. He is sexually active with male partners. His most recent CD4 count 2 months ago was 1080.    - Continue home biktarvy         VTE Risk Mitigation (From admission, onward)           Ordered     IP VTE HIGH RISK PATIENT  Once         12/26/23 0141     Place sequential compression device  Until discontinued         12/26/23 0141                    Discharge Planning   CASTILLO: 12/29/2023     Code Status: Full Code   Is the patient medically ready for discharge?: No    Reason for patient still in hospital (select all that apply): Patient trending condition and Treatment  Discharge Plan A: Home          Missy Perry MD  Department of Hospital Medicine   [facility] - Intensive Care ([place] [address])                      12/27/2023                             STAFF PHYSICIAN NOTE                                   Attending Attestation for Rounds with Resident  I have reviewed and concur with the resident's history, physical, assessment, and plan.  I have personally interviewed and examined the patient at bedside and agree with the resident's findings.          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 He was recently admitted [place] [facility] [place] for the same and weaned off IV narcotics prior to discharge home on his oral narcotics.  Afebrile and repeat CT abdomen showed improved pancreatic fluid collections.   -Wean Iv dilaudid & offer wean po dialudid under direct supervision in the hospital to prevent relapse. Discussed goals, plan, CT scan results, expected pain with each wean, MM meds, long tern  consequences of not weaning, etc.                               Janey Cho MD  Senior Hospitalist  Department of Hospital Medicine  [facility] [address], [phone]

## 2023-12-27 NOTE — HOSPITAL COURSE
Admitted w/ acute worsening of chronic abdominal pain with associated nausea and vomiting. Imaging showed stable to improved abdominal findings compared to previous: Impression: Sequela of pancreatitis as detailed above with several small thick-walled fluid collections adjacent to the pancreas, improved from prior CT.  Patient improved on IV and oral opiates and multimodal pain control and for 2 days diet was gradually advanced from liquid to regular food without further abdominal pain or worsening nausea. Patient was discharged in stable condition, with return precautions provided. Toradol prescribed on discharge to help with pain, in addition to patient's home opiates.

## 2023-12-28 VITALS
HEART RATE: 72 BPM | BODY MASS INDEX: 20.09 KG/M2 | DIASTOLIC BLOOD PRESSURE: 62 MMHG | WEIGHT: 140 LBS | SYSTOLIC BLOOD PRESSURE: 113 MMHG | RESPIRATION RATE: 17 BRPM | OXYGEN SATURATION: 96 % | TEMPERATURE: 98 F

## 2023-12-28 DIAGNOSIS — Z21 ASYMPTOMATIC HIV INFECTION, WITH NO HISTORY OF HIV-RELATED ILLNESS: ICD-10-CM

## 2023-12-28 LAB
ALBUMIN SERPL BCP-MCNC: 2.6 G/DL (ref 3.5–5.2)
ALP SERPL-CCNC: 75 U/L (ref 55–135)
ALT SERPL W/O P-5'-P-CCNC: 7 U/L (ref 10–44)
ANION GAP SERPL CALC-SCNC: 8 MMOL/L (ref 8–16)
AST SERPL-CCNC: 10 U/L (ref 10–40)
BASOPHILS # BLD AUTO: 0.01 K/UL (ref 0–0.2)
BASOPHILS NFR BLD: 0.2 % (ref 0–1.9)
BILIRUB SERPL-MCNC: 0.2 MG/DL (ref 0.1–1)
BUN SERPL-MCNC: 8 MG/DL (ref 6–20)
CALCIUM SERPL-MCNC: 8.3 MG/DL (ref 8.7–10.5)
CHLORIDE SERPL-SCNC: 108 MMOL/L (ref 95–110)
CO2 SERPL-SCNC: 25 MMOL/L (ref 23–29)
CREAT SERPL-MCNC: 0.7 MG/DL (ref 0.5–1.4)
DIFFERENTIAL METHOD: ABNORMAL
EOSINOPHIL # BLD AUTO: 0.1 K/UL (ref 0–0.5)
EOSINOPHIL NFR BLD: 2.2 % (ref 0–8)
ERYTHROCYTE [DISTWIDTH] IN BLOOD BY AUTOMATED COUNT: 18.2 % (ref 11.5–14.5)
EST. GFR  (NO RACE VARIABLE): >60 ML/MIN/1.73 M^2
GLUCOSE SERPL-MCNC: 98 MG/DL (ref 70–110)
HCT VFR BLD AUTO: 25.8 % (ref 40–54)
HGB BLD-MCNC: 7.9 G/DL (ref 14–18)
IMM GRANULOCYTES # BLD AUTO: 0.01 K/UL (ref 0–0.04)
IMM GRANULOCYTES NFR BLD AUTO: 0.2 % (ref 0–0.5)
LYMPHOCYTES # BLD AUTO: 1.9 K/UL (ref 1–4.8)
LYMPHOCYTES NFR BLD: 37.4 % (ref 18–48)
MAGNESIUM SERPL-MCNC: 2 MG/DL (ref 1.6–2.6)
MCH RBC QN AUTO: 23.7 PG (ref 27–31)
MCHC RBC AUTO-ENTMCNC: 30.6 G/DL (ref 32–36)
MCV RBC AUTO: 77 FL (ref 82–98)
MONOCYTES # BLD AUTO: 0.4 K/UL (ref 0.3–1)
MONOCYTES NFR BLD: 7.5 % (ref 4–15)
NEUTROPHILS # BLD AUTO: 2.7 K/UL (ref 1.8–7.7)
NEUTROPHILS NFR BLD: 52.5 % (ref 38–73)
NRBC BLD-RTO: 0 /100 WBC
PHOSPHATE SERPL-MCNC: 5.9 MG/DL (ref 2.7–4.5)
PLATELET # BLD AUTO: 244 K/UL (ref 150–450)
PMV BLD AUTO: 9 FL (ref 9.2–12.9)
POTASSIUM SERPL-SCNC: 3.8 MMOL/L (ref 3.5–5.1)
PROT SERPL-MCNC: 5.7 G/DL (ref 6–8.4)
RBC # BLD AUTO: 3.34 M/UL (ref 4.6–6.2)
SODIUM SERPL-SCNC: 141 MMOL/L (ref 136–145)
WBC # BLD AUTO: 5.06 K/UL (ref 3.9–12.7)

## 2023-12-28 PROCEDURE — 84100 ASSAY OF PHOSPHORUS: CPT

## 2023-12-28 PROCEDURE — 99285 EMERGENCY DEPT VISIT HI MDM: CPT | Mod: 25

## 2023-12-28 PROCEDURE — 63600175 PHARM REV CODE 636 W HCPCS

## 2023-12-28 PROCEDURE — 85025 COMPLETE CBC W/AUTO DIFF WBC: CPT

## 2023-12-28 PROCEDURE — 36415 COLL VENOUS BLD VENIPUNCTURE: CPT

## 2023-12-28 PROCEDURE — 80053 COMPREHEN METABOLIC PANEL: CPT

## 2023-12-28 PROCEDURE — 83735 ASSAY OF MAGNESIUM: CPT

## 2023-12-28 PROCEDURE — 25000003 PHARM REV CODE 250

## 2023-12-28 PROCEDURE — 96361 HYDRATE IV INFUSION ADD-ON: CPT

## 2023-12-28 PROCEDURE — 25000003 PHARM REV CODE 250: Performed by: STUDENT IN AN ORGANIZED HEALTH CARE EDUCATION/TRAINING PROGRAM

## 2023-12-28 RX ORDER — KETOROLAC TROMETHAMINE 10 MG/1
10 TABLET, FILM COATED ORAL EVERY 6 HOURS
Qty: 20 TABLET | Refills: 0 | Status: SHIPPED | OUTPATIENT
Start: 2023-12-28 | End: 2024-01-02

## 2023-12-28 RX ORDER — BICTEGRAVIR SODIUM, EMTRICITABINE, AND TENOFOVIR ALAFENAMIDE FUMARATE 50; 200; 25 MG/1; MG/1; MG/1
1 TABLET ORAL DAILY
Qty: 30 TABLET | Refills: 11 | Status: ACTIVE | OUTPATIENT
Start: 2023-12-28 | End: 2024-12-22

## 2023-12-28 RX ORDER — HYDROMORPHONE HYDROCHLORIDE 2 MG/1
4 TABLET ORAL EVERY 6 HOURS PRN
Status: DISCONTINUED | OUTPATIENT
Start: 2023-12-28 | End: 2023-12-28 | Stop reason: HOSPADM

## 2023-12-28 RX ADMIN — MORPHINE SULFATE 30 MG: 30 TABLET, FILM COATED, EXTENDED RELEASE ORAL at 08:12

## 2023-12-28 RX ADMIN — HYDROMORPHONE HYDROCHLORIDE 4 MG: 2 TABLET ORAL at 12:12

## 2023-12-28 RX ADMIN — HYDROMORPHONE HYDROCHLORIDE 0.5 MG: 0.5 INJECTION, SOLUTION INTRAMUSCULAR; INTRAVENOUS; SUBCUTANEOUS at 06:12

## 2023-12-28 RX ADMIN — PANTOPRAZOLE SODIUM 40 MG: 40 TABLET, DELAYED RELEASE ORAL at 08:12

## 2023-12-28 RX ADMIN — ACETAMINOPHEN 1000 MG: 500 TABLET ORAL at 06:12

## 2023-12-28 RX ADMIN — PREGABALIN 150 MG: 75 CAPSULE ORAL at 08:12

## 2023-12-28 RX ADMIN — METOCLOPRAMIDE 5 MG: 5 INJECTION, SOLUTION INTRAMUSCULAR; INTRAVENOUS at 06:12

## 2023-12-28 RX ADMIN — METHOCARBAMOL 500 MG: 500 TABLET ORAL at 08:12

## 2023-12-28 RX ADMIN — IBUPROFEN 200 MG: 200 TABLET, FILM COATED ORAL at 06:12

## 2023-12-28 RX ADMIN — HYDROMORPHONE HYDROCHLORIDE 0.5 MG: 0.5 INJECTION, SOLUTION INTRAMUSCULAR; INTRAVENOUS; SUBCUTANEOUS at 12:12

## 2023-12-28 RX ADMIN — METHOCARBAMOL 500 MG: 500 TABLET ORAL at 12:12

## 2023-12-28 RX ADMIN — FOLIC ACID 1 MG: 1 TABLET ORAL at 08:12

## 2023-12-28 RX ADMIN — BICTEGRAVIR SODIUM, EMTRICITABINE, AND TENOFOVIR ALAFENAMIDE FUMARATE 1 TABLET: 50; 200; 25 TABLET ORAL at 08:12

## 2023-12-28 NOTE — NURSING
AVS and discharge instructions given. All questions answered. IV removed. Pt bedside meds delivered. Pt refused wheelchair transport and stated he would ambulate down and call an uber.

## 2023-12-28 NOTE — PLAN OF CARE
Adequate for discharge.    Problem: Adult Inpatient Plan of Care  Goal: Plan of Care Review  Outcome: Met  Goal: Patient-Specific Goal (Individualized)  Outcome: Met  Goal: Absence of Hospital-Acquired Illness or Injury  Outcome: Met  Goal: Optimal Comfort and Wellbeing  Outcome: Met  Goal: Readiness for Transition of Care  Outcome: Met     Problem: Adjustment to Illness (Sepsis/Septic Shock)  Goal: Optimal Coping  Outcome: Met     Problem: Bleeding (Sepsis/Septic Shock)  Goal: Absence of Bleeding  Outcome: Met     Problem: Glycemic Control Impaired (Sepsis/Septic Shock)  Goal: Blood Glucose Level Within Desired Range  Outcome: Met     Problem: Infection Progression (Sepsis/Septic Shock)  Goal: Absence of Infection Signs and Symptoms  Outcome: Met     Problem: Nutrition Impaired (Sepsis/Septic Shock)  Goal: Optimal Nutrition Intake  Outcome: Met

## 2023-12-28 NOTE — DISCHARGE SUMMARY
[facility] - [facility] Care ([place] [address])  [facility] Medicine  Discharge Summary      Patient Name: Tasia Cardona  MRN: [id number]  HEATHER: [id number]  Patient Class: IP- Inpatient  Admission Date: 12/25/2023  Hospital Length of Stay: 1 days  Discharge Date and Time:  12/28/2023 10:46 AM  Attending Physician: Jimbo Flor MD   Discharging Provider: Lee Ann Rosas MD  Primary Care Provider: Pina Jones NP  [facility] Medicine Team: [facility] Lee Ann Rosas MD  Primary Care Team: [facility]    HPI:   Mr. Cardona 23 y.o M with the PMH of necrotizing pancreatitis, splenic vein thrombosis, polycythemia vera, and HIV presents to the ED for worsening abdominal pain nausea and vomiting that started yesterday. He wasn't able to keep any of his pain medicines down. He has very extensive history of repeated ED visits for this pain. He tried the morphine and dilaudid at home and they didn't help. He had 9/10 pain diffusely in the upper abdomen radiating to his shoulder. He ate food at chinese restaurant and after that started having N/V. He stopped drinking alcohol in September after his diagnosis of pancreatitis. He get phlebotomy for his PV every week sometimes every month depending on the severity of symptoms. He was on Eliquis before but not anymore. He had pancreatic stents placed and exchanged three times at [facility]. Last one was removed on 12/13. He has had extensive work up and endoscopies done int he past. Was found to have Corinne hitchcock tear after an episode of hematemesis. Patient was diagnosed with HIV 2 years ago and has been on anti retroviral therapy. He is sexually active with male partners. His most recent CD 4 count 2 months ago was 1080.     In the ED patient was vitally stable, /74, HR 79, RR 18, SpO2  97% on RA. UA was unremarkable. Labs show BUN 4, Cr 0.7, Lipase 46, Hb 11.4, WBC 7.51, Plt 456. CT abdomen pelvis shows Sequela of pancreatitis as detailed above with several small thick-walled  fluid collections adjacent to the pancreas, improved from prior CT. Evidence concerning for portal hypertension including splenomegaly, dilation of the main portal vein, and collateral vessel formation through the upper abdomen. Hepatomegaly. Mural thickening of the gastric antrum and pylorus, suggestive of significant gastritis.  No free intraperitoneal air.       * No surgery found *      Hospital Course:   Admitted w/ acute worsening of chronic abdominal pain with associated nausea and vomiting. Imaging showed stable to improved abdominal findings compared to previous: Impression: Sequela of pancreatitis as detailed above with several small thick-walled fluid collections adjacent to the pancreas, improved from prior CT.  Patient improved on IV and oral opiates and multimodal pain control and for 2 days diet was gradually advanced from liquid to regular food without further abdominal pain or worsening nausea. Patient was discharged in stable condition, with return precautions provided. Toradol prescribed on discharge to help with pain, in addition to patient's home opiates.      Goals of Care Treatment Preferences:  Code Status: Full Code      Consults: None    GI  * Necrotizing pancreatitis  Patient presented with abdominal pain, tenderness, nausea and vomiting and has had recurrent episodes of this in the past with multiple hospital admissions and endoscopies done in the past. CT abdomen pelvis was improved from prior. Patient does exhibit some drug seeking behavior and after the acute episode subsides this needs to be addressed in a non judgmental and clear way to set limitations on the use of IV opioids.    - Pain at baseline, tolerating PO  - Stable for discharge today  - Resume home opiates and other pain medications  - Toradol script provided for additional pain control  - Return precautions provided          Final Active Diagnoses:    Diagnosis Date Noted POA    PRINCIPAL PROBLEM:  Necrotizing pancreatitis  [K85.91] 10/31/2023 Yes     Chronic    Pain management [R52] 12/27/2023 Unknown    Acute on chronic pancreatitis [K85.90, K86.1] 12/15/2023 Yes    Splenic vein thrombosis [I82.890] 09/20/2023 Yes    Nausea & vomiting [R11.2] 12/17/2022 Yes    Polycythemia vera [D45] 11/28/2021 Yes    HIV infection [B20] 11/02/2021 Yes      Problems Resolved During this Admission:       Discharged Condition: stable    Disposition:     Follow Up:    Patient Instructions:   No discharge procedures on file.    Significant Diagnostic Studies: CT AP:  Impression:     Sequela of pancreatitis as detailed above with several small thick-walled fluid collections adjacent to the pancreas, improved from prior CT.     Evidence concerning for portal hypertension including splenomegaly, dilation of the main portal vein, and collateral vessel formation through the upper abdomen.     Hepatomegaly.     Mural thickening of the gastric antrum and pylorus, suggestive of significant gastritis.     No free intraperitoneal air.     In this patient reported to have been vomiting, images of the abdomen and lower chest demonstrate no CT evidence of acute Boerhaave syndrome.     Other findings are detailed above.      Pending Diagnostic Studies:       None           Medications:  Reconciled Home Medications:      Medication List        START taking these medications      ketorolac 10 mg tablet  Commonly known as: TORADOL  Take 1 tablet (10 mg total) by mouth every 6 (six) hours. for 5 days            CONTINUE taking these medications      BIKTARVY -25 mg (25 kg or greater)  Generic drug: yhawbuppy-pvschofh-qmirrch ala  Take 1 tablet by mouth once daily.     folic acid 1 MG tablet  Commonly known as: FOLVITE  Take 1 tablet (1 mg total) by mouth once daily.     GAVILAX 17 gram/dose powder  Generic drug: polyethylene glycol  Use cap to measure out (17 g) mix with a liquid and take by mouth once daily for 21 days.     HYDROmorphone 4 MG tablet  Commonly known  as: DILAUDID  Take 1.5 tablets (6 mg total) by mouth every 6 (six) hours as needed for Pain (Severe pain 8-10).     methocarbamoL 500 MG Tab  Commonly known as: ROBAXIN  Take 1 tablet (500 mg total) by mouth 4 (four) times daily. for 14 days     morphine 30 MG 12 hr tablet  Commonly known as: MS CONTIN  Take 1 tablet (30 mg total) by mouth every 12 (twelve) hours. for 7 days     pantoprazole 40 MG tablet  Commonly known as: PROTONIX  Take 1 tablet (40 mg total) by mouth 2 (two) times daily.     pregabalin 150 MG capsule  Commonly known as: LYRICA  Take 1 capsule (150 mg total) by mouth 2 (two) times daily. for 14 days     promethazine 25 MG tablet  Commonly known as: PHENERGAN  Take 1 tablet (25 mg total) by mouth every 6 (six) hours as needed.     STOOL SOFTENER-LAXATIVE 8.6-50 mg per tablet  Generic drug: senna-docusate 8.6-50 mg  Take 2 tablets by mouth 2 (two) times daily as needed for Constipation.              Indwelling Lines/Drains at time of discharge:   Lines/Drains/Airways       None                   Time spent on the discharge of patient: 35 minutes         Lee Ann Rosas MD  Department of Hospital Medicine  [facility] - Intensive Care ([place])

## 2023-12-28 NOTE — PLAN OF CARE
Problem: Adult Inpatient Plan of Care  Goal: Plan of Care Review  Outcome: Ongoing, Progressing     Problem: Adult Inpatient Plan of Care  Goal: Plan of Care Review  Outcome: Ongoing, Progressing

## 2023-12-28 NOTE — PLAN OF CARE
CM spoke with pt in room.  Instructed that dc orders are in.  Pt states he will Uber home.  Denies DME needs.    KATHRYN Martínez, BS, RN, CCM

## 2023-12-28 NOTE — PLAN OF CARE
Pcp scheduled   Future Appointments   Date Time Provider Department Center   1/9/2024 10:30 AM Michael Choi MD [facility]

## 2023-12-28 NOTE — PLAN OF CARE
[facility] - Intensive Care ([facility])  Discharge Final Note    Primary Care Provider: Pina Jones NP    Expected Discharge Date: 12/28/2023    Final Discharge Note (most recent)       Final Note - 12/28/23 1612          Final Note    Assessment Type Final Discharge Note (P)      Anticipated Discharge Disposition Home or Self Care (P)         Post-Acute Status    Discharge Delays None known at this time (P)                  Pt d/c'd home.    Radha Shore, RHONDAN, BS, RN, CCM        Important Message from Medicare

## 2023-12-28 NOTE — PLAN OF CARE
Problem: Adjustment to Illness (Sepsis/Septic Shock)  Goal: Optimal Coping  Outcome: Ongoing, Progressing     Problem: Infection Progression (Sepsis/Septic Shock)  Goal: Absence of Infection Signs and Symptoms  Outcome: Ongoing, Progressing     Problem: Nutrition Impaired (Sepsis/Septic Shock)  Goal: Optimal Nutrition Intake  Outcome: Ongoing, Progressing

## 2023-12-29 ENCOUNTER — HOSPITAL ENCOUNTER (OUTPATIENT)
Facility: HOSPITAL | Age: 23
Discharge: HOME | End: 2023-12-30
Attending: EMERGENCY MEDICINE | Admitting: STUDENT IN AN ORGANIZED HEALTH CARE EDUCATION/TRAINING PROGRAM
Payer: MEDICAID

## 2023-12-29 VITALS
RESPIRATION RATE: 20 BRPM | WEIGHT: 140 LBS | TEMPERATURE: 99 F | HEART RATE: 85 BPM | DIASTOLIC BLOOD PRESSURE: 84 MMHG | SYSTOLIC BLOOD PRESSURE: 127 MMHG | OXYGEN SATURATION: 99 % | BODY MASS INDEX: 20.09 KG/M2

## 2023-12-29 DIAGNOSIS — K85.90 ACUTE PANCREATITIS, UNSPECIFIED COMPLICATION STATUS, UNSPECIFIED PANCREATITIS TYPE: Primary | ICD-10-CM

## 2023-12-29 DIAGNOSIS — B20 HIV INFECTION, UNSPECIFIED SYMPTOM STATUS: ICD-10-CM

## 2023-12-29 DIAGNOSIS — R07.9 CHEST PAIN: ICD-10-CM

## 2023-12-29 PROBLEM — F11.20 OPIATE DEPENDENCE: Status: ACTIVE | Noted: 2023-12-29

## 2023-12-29 LAB
ALBUMIN SERPL BCP-MCNC: 3.2 G/DL (ref 3.5–5.2)
ALP SERPL-CCNC: 82 U/L (ref 55–135)
ALT SERPL W/O P-5'-P-CCNC: 5 U/L (ref 10–44)
ANION GAP SERPL CALC-SCNC: 12 MMOL/L (ref 8–16)
AST SERPL-CCNC: 9 U/L (ref 10–40)
BASOPHILS # BLD AUTO: 0.03 K/UL (ref 0–0.2)
BASOPHILS NFR BLD: 0.4 % (ref 0–1.9)
BILIRUB SERPL-MCNC: 0.2 MG/DL (ref 0.1–1)
BUN SERPL-MCNC: 5 MG/DL (ref 6–20)
CALCIUM SERPL-MCNC: 8.7 MG/DL (ref 8.7–10.5)
CHLORIDE SERPL-SCNC: 107 MMOL/L (ref 95–110)
CO2 SERPL-SCNC: 24 MMOL/L (ref 23–29)
CREAT SERPL-MCNC: 0.8 MG/DL (ref 0.5–1.4)
DIFFERENTIAL METHOD BLD: ABNORMAL
EOSINOPHIL # BLD AUTO: 0.2 K/UL (ref 0–0.5)
EOSINOPHIL NFR BLD: 1.8 % (ref 0–8)
ERYTHROCYTE [DISTWIDTH] IN BLOOD BY AUTOMATED COUNT: 18.3 % (ref 11.5–14.5)
EST. GFR  (NO RACE VARIABLE): >60 ML/MIN/1.73 M^2
GLUCOSE SERPL-MCNC: 121 MG/DL (ref 70–110)
HCT VFR BLD AUTO: 27.1 % (ref 40–54)
HGB BLD-MCNC: 8.2 G/DL (ref 14–18)
IMM GRANULOCYTES # BLD AUTO: 0.04 K/UL (ref 0–0.04)
IMM GRANULOCYTES NFR BLD AUTO: 0.5 % (ref 0–0.5)
LIPASE SERPL-CCNC: 83 U/L (ref 4–60)
LYMPHOCYTES # BLD AUTO: 2 K/UL (ref 1–4.8)
LYMPHOCYTES NFR BLD: 24.1 % (ref 18–48)
MCH RBC QN AUTO: 23.4 PG (ref 27–31)
MCHC RBC AUTO-ENTMCNC: 30.3 G/DL (ref 32–36)
MCV RBC AUTO: 77 FL (ref 82–98)
MONOCYTES # BLD AUTO: 0.6 K/UL (ref 0.3–1)
MONOCYTES NFR BLD: 6.9 % (ref 4–15)
NEUTROPHILS # BLD AUTO: 5.6 K/UL (ref 1.8–7.7)
NEUTROPHILS NFR BLD: 66.3 % (ref 38–73)
NRBC BLD-RTO: 0 /100 WBC
PLATELET # BLD AUTO: 235 K/UL (ref 150–450)
PMV BLD AUTO: 10 FL (ref 9.2–12.9)
POTASSIUM SERPL-SCNC: 4 MMOL/L (ref 3.5–5.1)
PROT SERPL-MCNC: 7.1 G/DL (ref 6–8.4)
RBC # BLD AUTO: 3.51 M/UL (ref 4.6–6.2)
SODIUM SERPL-SCNC: 143 MMOL/L (ref 136–145)
WBC # BLD AUTO: 8.37 K/UL (ref 3.9–12.7)

## 2023-12-29 PROCEDURE — 25500020 PHARM REV CODE 255: Performed by: EMERGENCY MEDICINE

## 2023-12-29 PROCEDURE — 63600175 PHARM REV CODE 636 W HCPCS: Performed by: STUDENT IN AN ORGANIZED HEALTH CARE EDUCATION/TRAINING PROGRAM

## 2023-12-29 PROCEDURE — 85025 COMPLETE CBC W/AUTO DIFF WBC: CPT | Performed by: EMERGENCY MEDICINE

## 2023-12-29 PROCEDURE — G0378 HOSPITAL OBSERVATION PER HR: HCPCS

## 2023-12-29 PROCEDURE — 63600175 PHARM REV CODE 636 W HCPCS

## 2023-12-29 PROCEDURE — 83690 ASSAY OF LIPASE: CPT | Performed by: EMERGENCY MEDICINE

## 2023-12-29 PROCEDURE — 80053 COMPREHEN METABOLIC PANEL: CPT | Performed by: EMERGENCY MEDICINE

## 2023-12-29 PROCEDURE — 96376 TX/PRO/DX INJ SAME DRUG ADON: CPT

## 2023-12-29 PROCEDURE — 96374 THER/PROPH/DIAG INJ IV PUSH: CPT | Mod: 59

## 2023-12-29 PROCEDURE — 25000003 PHARM REV CODE 250

## 2023-12-29 RX ORDER — IBUPROFEN 200 MG
24 TABLET ORAL
Status: DISCONTINUED | OUTPATIENT
Start: 2023-12-29 | End: 2023-12-30 | Stop reason: HOSPADM

## 2023-12-29 RX ORDER — PANTOPRAZOLE SODIUM 40 MG/1
40 TABLET, DELAYED RELEASE ORAL 2 TIMES DAILY
Status: DISCONTINUED | OUTPATIENT
Start: 2023-12-29 | End: 2023-12-30 | Stop reason: HOSPADM

## 2023-12-29 RX ORDER — GLUCAGON 1 MG
1 KIT INJECTION
Status: DISCONTINUED | OUTPATIENT
Start: 2023-12-29 | End: 2023-12-30 | Stop reason: HOSPADM

## 2023-12-29 RX ORDER — HYDROMORPHONE HYDROCHLORIDE 1 MG/ML
1 INJECTION, SOLUTION INTRAMUSCULAR; INTRAVENOUS; SUBCUTANEOUS
Status: COMPLETED | OUTPATIENT
Start: 2023-12-29 | End: 2023-12-29

## 2023-12-29 RX ORDER — HYDROMORPHONE HYDROCHLORIDE 1 MG/ML
1 INJECTION, SOLUTION INTRAMUSCULAR; INTRAVENOUS; SUBCUTANEOUS
Status: DISCONTINUED | OUTPATIENT
Start: 2023-12-29 | End: 2023-12-29

## 2023-12-29 RX ORDER — AMOXICILLIN 250 MG
2 CAPSULE ORAL 2 TIMES DAILY PRN
Status: DISCONTINUED | OUTPATIENT
Start: 2023-12-29 | End: 2023-12-30 | Stop reason: HOSPADM

## 2023-12-29 RX ORDER — ONDANSETRON 2 MG/ML
4 INJECTION INTRAMUSCULAR; INTRAVENOUS
Status: COMPLETED | OUTPATIENT
Start: 2023-12-29 | End: 2023-12-29

## 2023-12-29 RX ORDER — PROMETHAZINE HYDROCHLORIDE 25 MG/1
25 TABLET ORAL EVERY 6 HOURS PRN
Qty: 28 TABLET | Refills: 0 | Status: SHIPPED | OUTPATIENT
Start: 2023-12-29 | End: 2024-01-02

## 2023-12-29 RX ORDER — KETOROLAC TROMETHAMINE 30 MG/ML
15 INJECTION, SOLUTION INTRAMUSCULAR; INTRAVENOUS EVERY 6 HOURS PRN
Status: DISCONTINUED | OUTPATIENT
Start: 2023-12-29 | End: 2023-12-30 | Stop reason: HOSPADM

## 2023-12-29 RX ORDER — SODIUM CHLORIDE 0.9 % (FLUSH) 0.9 %
10 SYRINGE (ML) INJECTION EVERY 12 HOURS PRN
Status: DISCONTINUED | OUTPATIENT
Start: 2023-12-29 | End: 2023-12-30 | Stop reason: HOSPADM

## 2023-12-29 RX ORDER — METHOCARBAMOL 500 MG/1
500 TABLET, FILM COATED ORAL 4 TIMES DAILY
Status: DISCONTINUED | OUTPATIENT
Start: 2023-12-29 | End: 2023-12-30 | Stop reason: HOSPADM

## 2023-12-29 RX ORDER — SODIUM CHLORIDE, SODIUM LACTATE, POTASSIUM CHLORIDE, CALCIUM CHLORIDE 600; 310; 30; 20 MG/100ML; MG/100ML; MG/100ML; MG/100ML
INJECTION, SOLUTION INTRAVENOUS CONTINUOUS
Status: DISCONTINUED | OUTPATIENT
Start: 2023-12-29 | End: 2023-12-30 | Stop reason: HOSPADM

## 2023-12-29 RX ORDER — KETOROLAC TROMETHAMINE 10 MG/1
10 TABLET, FILM COATED ORAL EVERY 6 HOURS
Status: DISCONTINUED | OUTPATIENT
Start: 2023-12-29 | End: 2023-12-30 | Stop reason: HOSPADM

## 2023-12-29 RX ORDER — FOLIC ACID 1 MG/1
1 TABLET ORAL DAILY
Status: DISCONTINUED | OUTPATIENT
Start: 2023-12-29 | End: 2023-12-30 | Stop reason: HOSPADM

## 2023-12-29 RX ORDER — NALOXONE HCL 0.4 MG/ML
0.02 VIAL (ML) INJECTION
Status: DISCONTINUED | OUTPATIENT
Start: 2023-12-29 | End: 2023-12-30 | Stop reason: HOSPADM

## 2023-12-29 RX ORDER — IBUPROFEN 200 MG
16 TABLET ORAL
Status: DISCONTINUED | OUTPATIENT
Start: 2023-12-29 | End: 2023-12-30 | Stop reason: HOSPADM

## 2023-12-29 RX ADMIN — IOHEXOL 75 ML: 350 INJECTION, SOLUTION INTRAVENOUS at 02:12

## 2023-12-29 RX ADMIN — BICTEGRAVIR SODIUM, EMTRICITABINE, AND TENOFOVIR ALAFENAMIDE FUMARATE 1 TABLET: 50; 200; 25 TABLET ORAL at 10:12

## 2023-12-29 RX ADMIN — HYDROMORPHONE HYDROCHLORIDE 1 MG: 1 INJECTION, SOLUTION INTRAMUSCULAR; INTRAVENOUS; SUBCUTANEOUS at 01:12

## 2023-12-29 RX ADMIN — METHOCARBAMOL 500 MG: 500 TABLET ORAL at 09:12

## 2023-12-29 RX ADMIN — SODIUM CHLORIDE, POTASSIUM CHLORIDE, SODIUM LACTATE AND CALCIUM CHLORIDE: 600; 310; 30; 20 INJECTION, SOLUTION INTRAVENOUS at 06:12

## 2023-12-29 RX ADMIN — PANTOPRAZOLE SODIUM 40 MG: 40 TABLET, DELAYED RELEASE ORAL at 09:12

## 2023-12-29 RX ADMIN — HYDROMORPHONE HYDROCHLORIDE 1 MG: 1 INJECTION, SOLUTION INTRAMUSCULAR; INTRAVENOUS; SUBCUTANEOUS at 02:12

## 2023-12-29 RX ADMIN — HYDROMORPHONE HYDROCHLORIDE 1 MG: 1 INJECTION, SOLUTION INTRAMUSCULAR; INTRAVENOUS; SUBCUTANEOUS at 05:12

## 2023-12-29 RX ADMIN — FOLIC ACID 1 MG: 1 TABLET ORAL at 09:12

## 2023-12-29 RX ADMIN — SODIUM CHLORIDE, POTASSIUM CHLORIDE, SODIUM LACTATE AND CALCIUM CHLORIDE 1000 ML: 600; 310; 30; 20 INJECTION, SOLUTION INTRAVENOUS at 01:12

## 2023-12-29 RX ADMIN — ONDANSETRON 4 MG: 2 INJECTION INTRAMUSCULAR; INTRAVENOUS at 01:12

## 2023-12-29 NOTE — ASSESSMENT & PLAN NOTE
Mr. Cardona 23 y.o M with the PMH of necrotizing pancreatitis, splenic vein thrombosis, polycythemia vera, and HIV presents to the ED for worsening abdominal pain nausea and vomiting that started yesterday. Pt Is a bounce back to [facility]. Was D/C 12/28 due to medical optomization. Patient is opiate dependant and has an OP appointment 1/9. When asked what changed he said some nausea and vomitting after eating and his pain came back. In the ED Pt was Pulse 90 /79 98% on RA. WBC WNL HGB 8.2  Lipase 83. Ct shows pancreatitis with continued improvement in size of peripancreatic collections. No evidence of a new peripancreatic collection or worsening pancreatitis. Evidence concerning for portal hypertension including splenomegaly, dilation of the main portal vein, and collateral vessel formation through the upper abdomen. Question wall thickening and inflammation about the transverse colon near the hepatic flexure, possibly suggesting nonspecific colitis, but persistent generalized intra-abdominal inflammatory changes related to recent pancreatitis are felt more likely. Interval development of small bilateral pleural effusions with mild adjacent compressive atelectasis.  Pt is on Dilaudid 6q6hr, MS Cotin 30mg q12 hrs and  Lyrica 150 BID Patient was admitted to [facility] as a bounce back for pain control. Patient received Dilaudid, Zofran, and fluids in the ED.     Plan  Multimodal pain control  Pt not in acute distress  Tordol + heat packs  Fluids  Continue home meds

## 2023-12-29 NOTE — H&P
[facility] - Emergency Dept  [facility] Medicine  History & Physical    Patient Name: Tasia Cardona  MRN: [id number]  Patient Class: OP- Observation  Admission Date: 12/29/2023  Attending Physician: Jimbo Flor MD   Primary Care Provider: Pina Jones NP         Patient information was obtained from patient and ER records.     Subjective:     Principal Problem:Opiate dependence    Chief Complaint:   Chief Complaint   Patient presents with    Abdominal Pain     Possible pancreatitis flare up. LLQ abdominal pain. N/V        HPI: Mr. Cardona 23 y.o M with the PMH of necrotizing pancreatitis, splenic vein thrombosis, polycythemia vera, and HIV presents to the ED for worsening abdominal pain nausea and vomiting that started yesterday. He wasn't able to keep any of his pain medicines down. He has very extensive history of repeated ED visits for this pain. He tried the morphine and dilaudid at home and they didn't help. He had 9/10 pain diffusely in the upper abdomen radiating to his shoulder. He ate food at chinese restaurant and after that started having N/V. He stopped drinking alcohol in September after his diagnosis of pancreatitis. He get phlebotomy for his PV every week sometimes every month depending on the severity of symptoms. He was on Eliquis before but not anymore. He had pancreatic stents placed and exchanged three times at [facility]. Last one was removed on 12/13. He has had extensive work up and endoscopies done int he past. Was found to have Corinne hitchcock tear after an episode of hematemesis. Patient was diagnosed with HIV 2 years ago and has been on anti retroviral therapy. He is sexually active with male partners. His most recent CD 4 count 2 months ago was 1080.      Pt Is a bounce back to Team 3. Was D/C 12/28 due to medical optomization. Patient is opiate dependant and has an OP appointment 1/9. When asked what changed he said some nausea and vomitting after eating and his pain came back. In the  ED Pt was Pulse 90 /79 98% on RA. WBC WNL HGB 8.2  Lipase 83. Ct shows pancreatitis with continued improvement in size of peripancreatic collections. No evidence of a new peripancreatic collection or worsening pancreatitis. Evidence concerning for portal hypertension including splenomegaly, dilation of the main portal vein, and collateral vessel formation through the upper abdomen. Question wall thickening and inflammation about the transverse colon near the hepatic flexure, possibly suggesting nonspecific colitis, but persistent generalized intra-abdominal inflammatory changes related to recent pancreatitis are felt more likely. Interval development of small bilateral pleural effusions with mild adjacent compressive atelectasis.  Pt is on Dilaudid 6q6hr, MS Cotin 30mg q12 hrs and  Lyrica 150 BID    Patient was admitted to [facility] as a bounce back for pain control. Patient received Dilaudid, Zofran, and fluids in the ED.     Past Medical History:   Diagnosis Date    Acute necrotizing pancreatitis 09/20/2023    Alcohol use disorder 10/05/2023    Asthma     Hepatitis C antibody positive in blood 09/18/2023 9/2023 PCR negative    HIV infection 10/2021    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       Past Surgical History:   Procedure Laterality Date    ENDOSCOPIC ULTRASOUND OF UPPER GASTROINTESTINAL TRACT N/A 10/23/2023    Procedure: ULTRASOUND, UPPER GI TRACT, ENDOSCOPIC;  Surgeon: Emil Villatoro MD;  Location: [address]);  Service: Endoscopy;  Laterality: N/A;    ERCP N/A 10/23/2023    Procedure: ERCP (ENDOSCOPIC RETROGRADE CHOLANGIOPANCREATOGRAPHY);  Surgeon: Emil Villatoro MD;  Location: [facility] ([address] [place] [address]);  Service: Endoscopy;  Laterality: N/A;    ESOPHAGOGASTRODUODENOSCOPY N/A 9/18/2023     Procedure: EGD (ESOPHAGOGASTRODUODENOSCOPY);  Surgeon: Kg Cleaning MD;  Location: [address]);  Service: Endoscopy;  Laterality: N/A;       Review of patient's allergies indicates:   Allergen Reactions    Bradford Swelling    Pcn [penicillins] Hives     Tolerates cephalosporins    Pecan nut Swelling    Sulfa (sulfonamide antibiotics) Hives       Current Facility-Administered Medications on File Prior to Encounter   Medication    [DISCONTINUED] acetaminophen tablet 1,000 mg    [DISCONTINUED] ugcgsshmr-zrgxlhxm-icqnkza ala -25 mg (25 kg or greater) 1 tablet    [DISCONTINUED] dextrose 10% bolus 125 mL 125 mL    [DISCONTINUED] dextrose 10% bolus 250 mL 250 mL    [DISCONTINUED] folic acid tablet 1 mg    [DISCONTINUED] glucagon (human recombinant) injection 1 mg    [DISCONTINUED] glucose chewable tablet 16 g    [DISCONTINUED] glucose chewable tablet 24 g    [DISCONTINUED] HYDROmorphone injection 0.5 mg    [DISCONTINUED] HYDROmorphone tablet 4 mg    [DISCONTINUED] HYDROmorphone tablet 6 mg    [DISCONTINUED] ibuprofen tablet 200 mg    [DISCONTINUED] melatonin tablet 3 mg    [DISCONTINUED] methocarbamoL tablet 500 mg    [DISCONTINUED] metoclopramide injection 5 mg    [DISCONTINUED] morphine 12 hr tablet 30 mg    [DISCONTINUED] naloxone 0.4 mg/mL injection 0.02 mg    [DISCONTINUED] ondansetron injection 4 mg    [DISCONTINUED] pantoprazole EC tablet 40 mg    [DISCONTINUED] polyethylene glycol packet 17 g    [DISCONTINUED] pregabalin capsule 150 mg    [DISCONTINUED] prochlorperazine injection Soln 2.5 mg    [DISCONTINUED] senna-docusate 8.6-50 mg per tablet 2 tablet    [DISCONTINUED] sodium chloride 0.9% flush 10 mL     Current Outpatient Medications on File Prior to Encounter   Medication Sig    yopbvyppj-znpiixsf-rsqmozu ala (BIKTARVY) -25 mg (25 kg or greater) Take 1 tablet by mouth once daily.    folic acid (FOLVITE) 1 MG tablet Take 1 tablet (1 mg total) by mouth once daily.    []  HYDROmorphone (DILAUDID) 4 MG tablet Take 1.5 tablets (6 mg total) by mouth every 6 (six) hours as needed for Pain (Severe pain 8-10).    ketorolac (TORADOL) 10 mg tablet Take 1 tablet (10 mg total) by mouth every 6 (six) hours. for 5 days    methocarbamoL (ROBAXIN) 500 MG Tab Take 1 tablet (500 mg total) by mouth 4 (four) times daily. for 14 days    [] morphine (MS CONTIN) 30 MG 12 hr tablet Take 1 tablet (30 mg total) by mouth every 12 (twelve) hours. for 7 days    pantoprazole (PROTONIX) 40 MG tablet Take 1 tablet (40 mg total) by mouth 2 (two) times daily.    polyethylene glycol (GLYCOLAX) 17 gram/dose powder Use cap to measure out (17 g) mix with a liquid and take by mouth once daily for 21 days.    pregabalin (LYRICA) 150 MG capsule Take 1 capsule (150 mg total) by mouth 2 (two) times daily. for 14 days    [] promethazine (PHENERGAN) 25 MG tablet Take 1 tablet (25 mg total) by mouth every 6 (six) hours as needed.    senna-docusate 8.6-50 mg (PERICOLACE) 8.6-50 mg per tablet Take 2 tablets by mouth 2 (two) times daily as needed for Constipation.     Family History       Problem Relation (Age of Onset)    Breast cancer Maternal Grandmother    Cancer Mother, Brother    No Known Problems Father    Ovarian cancer Mother    Pancreatitis Mother          Tobacco Use    Smoking status: Former     Types: Cigarettes    Smokeless tobacco: Never   Substance and Sexual Activity    Alcohol use: Not Currently    Drug use: Never    Sexual activity: Not on file     Review of Systems   Constitutional:  Negative for chills, fatigue and fever.   HENT: Negative.     Eyes: Negative.    Cardiovascular:  Negative for chest pain and leg swelling.   Gastrointestinal:  Positive for abdominal pain, nausea and vomiting. Negative for diarrhea.   Endocrine: Negative for polydipsia and polyphagia.   Genitourinary:  Negative for frequency, hematuria and urgency.   Musculoskeletal:  Negative for arthralgias and myalgias.   Skin:   Negative for rash.   Psychiatric/Behavioral:  Negative for agitation and behavioral problems. The patient is not nervous/anxious.      Objective:     Vital Signs (Most Recent):  Temp: 98.7 °F (37.1 °C) (12/29/23 0634)  Pulse: 90 (12/29/23 0634)  Resp: 18 (12/29/23 0634)  BP: 132/79 (12/29/23 0634)  SpO2: 98 % (12/29/23 0634) Vital Signs (24h Range):  Temp:  [98 °F (36.7 °C)-99.9 °F (37.7 °C)] 98.7 °F (37.1 °C)  Pulse:  [] 90  Resp:  [16-20] 18  SpO2:  [95 %-98 %] 98 %  BP: (111-146)/(62-79) 132/79     Weight: 63.5 kg (140 lb)  Body mass index is 20.09 kg/m².     Physical Exam  Constitutional:       General: He is not in acute distress.     Appearance: Normal appearance. He is not ill-appearing, toxic-appearing or diaphoretic.      Comments: Comfortable resting in bed   HENT:      Head: Atraumatic.      Mouth/Throat:      Mouth: Mucous membranes are moist.   Eyes:      Extraocular Movements: Extraocular movements intact.   Abdominal:      General: Abdomen is flat. Bowel sounds are normal. There is no distension.      Palpations: Abdomen is soft. There is no mass.      Tenderness: There is no abdominal tenderness. There is no guarding or rebound.      Hernia: No hernia is present.      Comments: Diffuse upper abdominal tenderness. Scar marks on lower belly from previous accident.   Musculoskeletal:      Right lower leg: No edema.      Left lower leg: No edema.   Skin:     General: Skin is warm.      Findings: No bruising.   Neurological:      General: No focal deficit present.      Mental Status: He is alert and oriented to person, place, and time.   Psychiatric:         Mood and Affect: Mood normal.         Behavior: Behavior normal.         Thought Content: Thought content normal.         Judgment: Judgment normal.                Significant Labs: All pertinent labs within the past 24 hours have been reviewed.    Significant Imaging: I have reviewed all pertinent imaging results/findings within the past 24  hours.  Assessment/Plan:     * Opiate dependence  Mr. Cardona 23 y.o M with the PMH of necrotizing pancreatitis, splenic vein thrombosis, polycythemia vera, and HIV presents to the ED for worsening abdominal pain nausea and vomiting that started yesterday. Pt Is a bounce back to Team [facility]. Was D/C 12/28 due to medical optomization. Patient is opiate dependant and has an OP appointment 1/9. When asked what changed he said some nausea and vomitting after eating and his pain came back. In the ED Pt was Pulse 90 /79 98% on RA. WBC WNL HGB 8.2  Lipase 83. Ct shows pancreatitis with continued improvement in size of peripancreatic collections. No evidence of a new peripancreatic collection or worsening pancreatitis. Evidence concerning for portal hypertension including splenomegaly, dilation of the main portal vein, and collateral vessel formation through the upper abdomen. Question wall thickening and inflammation about the transverse colon near the hepatic flexure, possibly suggesting nonspecific colitis, but persistent generalized intra-abdominal inflammatory changes related to recent pancreatitis are felt more likely. Interval development of small bilateral pleural effusions with mild adjacent compressive atelectasis.  Pt is on Dilaudid 6q6hr, MS Cotin 30mg q12 hrs and  Lyrica 150 BID Patient was admitted to [facility] as a bounce back for pain control. Patient received Dilaudid, Zofran, and fluids in the ED.     Plan  Multimodal pain control  Pt not in acute distress  Tordol + heat packs  Fluids  Continue home meds when appropriate      Necrotizing pancreatitis  Stable, improved from last admission.    See Opiate dependance      Pancreatic pseudocyst/cyst  Stable      Splenic vein thrombosis  Stable      Chronic pancreatitis  Improved from prior      Nausea & vomiting  PRNs, stable    Polycythemia vera  Monitor, stable      HIV infection  Home meds        VTE Risk Mitigation (From admission, onward)            Ordered     IP VTE HIGH RISK PATIENT  Once         12/29/23 0626     Place sequential compression device  Until discontinued         12/29/23 0626                           Davin Hedrick DO  Department of Hospital Medicine  [facility] - Emergency Dept

## 2023-12-29 NOTE — DISCHARGE SUMMARY
[facility] - Emergency Dept  Hospital Medicine  Discharge Summary      Patient Name: Tasia Cardona  MRN: [id number]  HEATHER: [id number]  Patient Class: OP- Observation  Admission Date: 12/29/2023  Hospital Length of Stay: 0 days  Discharge Date and Time:  12/29/2023 8:12 AM  Attending Physician: Jimbo Flor MD   Discharging Provider: Davin Hedrick DO  Primary Care Provider: Pina Jones NP  Hospital Medicine Team: [facility] Davin Hedrick DO  Primary Care Team: [facility] MED 3    HPI:   Mr. Cardona 23 y.o M with the PMH of necrotizing pancreatitis, splenic vein thrombosis, polycythemia vera, and HIV presents to the ED for worsening abdominal pain nausea and vomiting that started yesterday. He wasn't able to keep any of his pain medicines down. He has very extensive history of repeated ED visits for this pain. He tried the morphine and dilaudid at home and they didn't help. He had 9/10 pain diffusely in the upper abdomen radiating to his shoulder. He ate food at chinese restaurant and after that started having N/V. He stopped drinking alcohol in September after his diagnosis of pancreatitis. He get phlebotomy for his PV every week sometimes every month depending on the severity of symptoms. He was on Eliquis before but not anymore. He had pancreatic stents placed and exchanged three times at [facility]. Last one was removed on 12/13. He has had extensive work up and endoscopies done int he past. Was found to have Corinne hitchcock tear after an episode of hematemesis. Patient was diagnosed with HIV 2 years ago and has been on anti retroviral therapy. He is sexually active with male partners. His most recent CD 4 count 2 months ago was 1080.      Pt Is a bounce back to Team 3. Was D/C 12/28 due to medical optomization. Patient is opiate dependant and has an OP appointment 1/9. When asked what changed he said some nausea and vomitting after eating and his pain came back. In the ED Pt was Pulse 90 /79 98% on  RA. WBC WNL HGB 8.2  Lipase 83. Ct shows pancreatitis with continued improvement in size of peripancreatic collections. No evidence of a new peripancreatic collection or worsening pancreatitis. Evidence concerning for portal hypertension including splenomegaly, dilation of the main portal vein, and collateral vessel formation through the upper abdomen. Question wall thickening and inflammation about the transverse colon near the hepatic flexure, possibly suggesting nonspecific colitis, but persistent generalized intra-abdominal inflammatory changes related to recent pancreatitis are felt more likely. Interval development of small bilateral pleural effusions with mild adjacent compressive atelectasis.  Pt is on Dilaudid 6q6hr, MS Cotin 30mg q12 hrs and  Lyrica 150 BID    Patient was admitted to [facility] as a bounce back for pain control. Patient received Dilaudid, Zofran, and fluids in the ED.     * No surgery found *      Hospital Course:   Admitted to Hospital medicine team 3 for Chronic Pancreatitis. D/C yesterday afternoon after medical optomization. From a medical standpoint, his scans show improvement of his Pancreatitis. HDS. Labs unremarkable. Patient resting comfortably in bed at time of exam. Stable for D/C pending Staff evaluation.       Goals of Care Treatment Preferences:  Code Status: Full Code      Consults:     No new Assessment & Plan notes have been filed under this hospital service since the last note was generated.  Service: Hospital Medicine    Final Active Diagnoses:    Diagnosis Date Noted POA    PRINCIPAL PROBLEM:  Opiate dependence [F11.20] 12/29/2023 Yes    Necrotizing pancreatitis [K85.91] 10/31/2023 Yes     Chronic    Pancreatic pseudocyst/cyst [K86.2, K86.3] 10/24/2023 Yes    Chronic pancreatitis [K86.1] 09/20/2023 Yes    Splenic vein thrombosis [I82.890] 09/20/2023 Yes    Nausea & vomiting [R11.2] 12/17/2022 Yes    Polycythemia vera [D45] 11/28/2021 Yes    HIV infection [B20]  11/02/2021 Yes      Problems Resolved During this Admission:       Discharged Condition: fair    Disposition: Home or Self Care    Follow Up:    Patient Instructions:   No discharge procedures on file.    Significant Diagnostic Studies: N/A    Pending Diagnostic Studies:       None           Medications:  Reconciled Home Medications:      Medication List        CONTINUE taking these medications      BIKTARVY -25 mg (25 kg or greater)  Generic drug: svegffypu-irzlklhf-kopvxtj ala  Take 1 tablet by mouth once daily.     folic acid 1 MG tablet  Commonly known as: FOLVITE  Take 1 tablet (1 mg total) by mouth once daily.     GAVILAX 17 gram/dose powder  Generic drug: polyethylene glycol  Use cap to measure out (17 g) mix with a liquid and take by mouth once daily for 21 days.     ketorolac 10 mg tablet  Commonly known as: TORADOL  Take 1 tablet (10 mg total) by mouth every 6 (six) hours. for 5 days     methocarbamoL 500 MG Tab  Commonly known as: ROBAXIN  Take 1 tablet (500 mg total) by mouth 4 (four) times daily. for 14 days     pantoprazole 40 MG tablet  Commonly known as: PROTONIX  Take 1 tablet (40 mg total) by mouth 2 (two) times daily.     pregabalin 150 MG capsule  Commonly known as: LYRICA  Take 1 capsule (150 mg total) by mouth 2 (two) times daily. for 14 days     promethazine 25 MG tablet  Commonly known as: PHENERGAN  Take 1 tablet (25 mg total) by mouth every 6 (six) hours as needed.     STOOL SOFTENER-LAXATIVE 8.6-50 mg per tablet  Generic drug: senna-docusate 8.6-50 mg  Take 2 tablets by mouth 2 (two) times daily as needed for Constipation.            STOP taking these medications      HYDROmorphone 4 MG tablet  Commonly known as: DILAUDID     morphine 30 MG 12 hr tablet  Commonly known as: MS CONTIN              Indwelling Lines/Drains at time of discharge:   Lines/Drains/Airways       None                   Time spent on the discharge of patient: 60 minutes         Davin Hedrick DO  Department of  [facility] Medicine  [facility] - Emergency Dept

## 2023-12-29 NOTE — SUBJECTIVE & OBJECTIVE
Past Medical History:   Diagnosis Date    Acute necrotizing pancreatitis 09/20/2023    Alcohol use disorder 10/05/2023    Asthma     Hepatitis C antibody positive in blood 09/18/2023 9/2023 PCR negative    HIV infection 10/2021    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       Past Surgical History:   Procedure Laterality Date    ENDOSCOPIC ULTRASOUND OF UPPER GASTROINTESTINAL TRACT N/A 10/23/2023    Procedure: ULTRASOUND, UPPER GI TRACT, ENDOSCOPIC;  Surgeon: Emil Villatoro MD;  [facility]  Service: Endoscopy;  Laterality: N/A;    ERCP N/A 10/23/2023    Procedure: ERCP (ENDOSCOPIC RETROGRADE CHOLANGIOPANCREATOGRAPHY);  Surgeon: Emil Villatoro MD;  [facility]  Service: Endoscopy;  Laterality: N/A;    ESOPHAGOGASTRODUODENOSCOPY N/A 9/18/2023    Procedure: EGD (ESOPHAGOGASTRODUODENOSCOPY);  Surgeon: Kg Cleaning MD;  [facility]  Service: Endoscopy;  Laterality: N/A;       Review of patient's allergies indicates:   Allergen Reactions    [place] Swelling    Pcn [penicillins] Hives     Tolerates cephalosporins    Pecan nut Swelling    Sulfa (sulfonamide antibiotics) Hives       Current Facility-Administered Medications on File Prior to Encounter   Medication    [DISCONTINUED] acetaminophen tablet 1,000 mg    [DISCONTINUED] urkmbfhqh-qpsxaxdn-avdcqsp ala -25 mg (25 kg or greater) 1 tablet    [DISCONTINUED] dextrose 10% bolus 125 mL 125 mL    [DISCONTINUED] dextrose 10% bolus 250 mL 250 mL    [DISCONTINUED] folic acid tablet 1 mg    [DISCONTINUED] glucagon (human recombinant) injection 1 mg    [DISCONTINUED] glucose chewable tablet 16 g    [DISCONTINUED] glucose chewable tablet 24 g    [DISCONTINUED] HYDROmorphone injection 0.5 mg    [DISCONTINUED]  HYDROmorphone tablet 4 mg    [DISCONTINUED] HYDROmorphone tablet 6 mg    [DISCONTINUED] ibuprofen tablet 200 mg    [DISCONTINUED] melatonin tablet 3 mg    [DISCONTINUED] methocarbamoL tablet 500 mg    [DISCONTINUED] metoclopramide injection 5 mg    [DISCONTINUED] morphine 12 hr tablet 30 mg    [DISCONTINUED] naloxone 0.4 mg/mL injection 0.02 mg    [DISCONTINUED] ondansetron injection 4 mg    [DISCONTINUED] pantoprazole EC tablet 40 mg    [DISCONTINUED] polyethylene glycol packet 17 g    [DISCONTINUED] pregabalin capsule 150 mg    [DISCONTINUED] prochlorperazine injection Soln 2.5 mg    [DISCONTINUED] senna-docusate 8.6-50 mg per tablet 2 tablet    [DISCONTINUED] sodium chloride 0.9% flush 10 mL     Current Outpatient Medications on File Prior to Encounter   Medication Sig    qlntgqkfn-kfmqnhsd-wekapsf ala (BIKTARVY) -25 mg (25 kg or greater) Take 1 tablet by mouth once daily.    folic acid (FOLVITE) 1 MG tablet Take 1 tablet (1 mg total) by mouth once daily.    [] HYDROmorphone (DILAUDID) 4 MG tablet Take 1.5 tablets (6 mg total) by mouth every 6 (six) hours as needed for Pain (Severe pain 8-10).    ketorolac (TORADOL) 10 mg tablet Take 1 tablet (10 mg total) by mouth every 6 (six) hours. for 5 days    methocarbamoL (ROBAXIN) 500 MG Tab Take 1 tablet (500 mg total) by mouth 4 (four) times daily. for 14 days    [] morphine (MS CONTIN) 30 MG 12 hr tablet Take 1 tablet (30 mg total) by mouth every 12 (twelve) hours. for 7 days    pantoprazole (PROTONIX) 40 MG tablet Take 1 tablet (40 mg total) by mouth 2 (two) times daily.    polyethylene glycol (GLYCOLAX) 17 gram/dose powder Use cap to measure out (17 g) mix with a liquid and take by mouth once daily for 21 days.    pregabalin (LYRICA) 150 MG capsule Take 1 capsule (150 mg total) by mouth 2 (two) times daily. for 14 days    [] promethazine (PHENERGAN) 25 MG tablet Take 1 tablet (25 mg total) by mouth every 6 (six) hours as needed.     senna-docusate 8.6-50 mg (PERICOLACE) 8.6-50 mg per tablet Take 2 tablets by mouth 2 (two) times daily as needed for Constipation.     Family History       Problem Relation (Age of Onset)    Breast cancer Maternal Grandmother    Cancer Mother, Brother    No Known Problems Father    Ovarian cancer Mother    Pancreatitis Mother          Tobacco Use    Smoking status: Former     Types: Cigarettes    Smokeless tobacco: Never   Substance and Sexual Activity    Alcohol use: Not Currently    Drug use: Never    Sexual activity: Not on file     Review of Systems   Constitutional:  Negative for chills, fatigue and fever.   HENT: Negative.     Eyes: Negative.    Cardiovascular:  Negative for chest pain and leg swelling.   Gastrointestinal:  Positive for abdominal pain, nausea and vomiting. Negative for diarrhea.   Endocrine: Negative for polydipsia and polyphagia.   Genitourinary:  Negative for frequency, hematuria and urgency.   Musculoskeletal:  Negative for arthralgias and myalgias.   Skin:  Negative for rash.   Psychiatric/Behavioral:  Negative for agitation and behavioral problems. The patient is not nervous/anxious.      Objective:     Vital Signs (Most Recent):  Temp: 98.7 °F (37.1 °C) (12/29/23 0634)  Pulse: 90 (12/29/23 0634)  Resp: 18 (12/29/23 0634)  BP: 132/79 (12/29/23 0634)  SpO2: 98 % (12/29/23 0634) Vital Signs (24h Range):  Temp:  [98 °F (36.7 °C)-99.9 °F (37.7 °C)] 98.7 °F (37.1 °C)  Pulse:  [] 90  Resp:  [16-20] 18  SpO2:  [95 %-98 %] 98 %  BP: (111-146)/(62-79) 132/79     Weight: 63.5 kg (140 lb)  Body mass index is 20.09 kg/m².     Physical Exam  Constitutional:       General: He is not in acute distress.     Appearance: Normal appearance. He is not ill-appearing, toxic-appearing or diaphoretic.      Comments: Comfortable resting in bed   HENT:      Head: Atraumatic.      Mouth/Throat:      Mouth: Mucous membranes are moist.   Eyes:      Extraocular Movements: Extraocular movements intact.    Abdominal:      General: Abdomen is flat. Bowel sounds are normal. There is no distension.      Palpations: Abdomen is soft. There is no mass.      Tenderness: There is no abdominal tenderness. There is no guarding or rebound.      Hernia: No hernia is present.      Comments: Diffuse upper abdominal tenderness. Scar marks on lower belly from previous accident.   Musculoskeletal:      Right lower leg: No edema.      Left lower leg: No edema.   Skin:     General: Skin is warm.      Findings: No bruising.   Neurological:      General: No focal deficit present.      Mental Status: He is alert and oriented to person, place, and time.   Psychiatric:         Mood and Affect: Mood normal.         Behavior: Behavior normal.         Thought Content: Thought content normal.         Judgment: Judgment normal.                Significant Labs: All pertinent labs within the past 24 hours have been reviewed.    Significant Imaging: I have reviewed all pertinent imaging results/findings within the past 24 hours.

## 2023-12-29 NOTE — NURSING
Pt discharge per md orders. Pt verbalize complete understanding and follow up appointment. Pt was given a copy of home care d/c instructions w/ a list of home medications.  Address all issues prior d/c . Pt left w/ all of____ valuables. Pt voice no concerns. Transport notified of discharge. Tele box place in dirty bin by .

## 2023-12-29 NOTE — ED PROVIDER NOTES
Encounter Date: 12/28/2023       History     Chief Complaint   Patient presents with    Abdominal Pain     Possible pancreatitis flare up. LLQ abdominal pain. N/V     23-year-old male with a past medical history of necrotizing pancreatitis, polycythemia vera, HIV presents to the ED complaining of epigastric abdominal pain with associated vomiting that onset at 5:00 p.m. today.  Patient states this feels very similar to his chronic, necrotizing pancreatitis flare ups.  He states that in his vomit he noticed specks of blood but denies any gross blood.  Currently rates the pain to be 9/10 in terms of intensity.  No other complaints at this time.    The history is provided by the patient and medical records.     Review of patient's allergies indicates:   Allergen Reactions    [place] Swelling    Pcn [penicillins] Hives     Tolerates cephalosporins    Pecan nut Swelling    Sulfa (sulfonamide antibiotics) Hives     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     Past Surgical History:   Procedure Laterality Date    ENDOSCOPIC ULTRASOUND OF UPPER GASTROINTESTINAL TRACT N/A 10/23/2023    Procedure: ULTRASOUND, UPPER GI TRACT, ENDOSCOPIC;  Surgeon: Emil Villatoro MD;  Location: [address]);  Service: Endoscopy;  Laterality: N/A;    ERCP N/A 10/23/2023    Procedure: ERCP (ENDOSCOPIC RETROGRADE CHOLANGIOPANCREATOGRAPHY);  Surgeon: Emil Villatoro MD;  Location: [address]);  Service: Endoscopy;  Laterality: N/A;    ESOPHAGOGASTRODUODENOSCOPY N/A 9/18/2023     Procedure: EGD (ESOPHAGOGASTRODUODENOSCOPY);  Surgeon: Kg Cleaning MD;  Location: [facility] ([address] [place] [address]);  Service: Endoscopy;  Laterality: N/A;     Family History   Problem Relation Age of Onset    Pancreatitis Mother     Cancer Mother     Ovarian cancer Mother     No Known Problems Father     Cancer Brother     Breast cancer Maternal Grandmother      Social History     Tobacco Use    Smoking status: Former     Types: Cigarettes    Smokeless tobacco: Never   Substance Use Topics    Alcohol use: Not Currently    Drug use: Never     Review of Systems    Physical Exam     Initial Vitals [12/28/23 2300]   BP Pulse Resp Temp SpO2   (!) 146/67 (!) 114 20 99.9 °F (37.7 °C) 97 %      MAP       --         Physical Exam    Nursing note and vitals reviewed.  Constitutional: Vital signs are normal. He appears well-developed and well-nourished. He is not diaphoretic. He appears distressed.   HENT:   Head: Normocephalic and atraumatic.   Right Ear: External ear normal.   Left Ear: External ear normal.   Eyes: EOM are normal. Right eye exhibits no discharge. Left eye exhibits no discharge.   Neck: Trachea normal. Neck supple. No thyroid mass present.   Normal range of motion.  Cardiovascular:  Regular rhythm, normal heart sounds and intact distal pulses.     Exam reveals no gallop and no friction rub.       No murmur heard.  Tachycardic.   Pulmonary/Chest: Breath sounds normal. No respiratory distress. He has no wheezes. He has no rhonchi. He has no rales.   Abdominal: Abdomen is soft. Bowel sounds are normal. He exhibits no distension. There is abdominal tenderness (Generalized predominantly in the epigastrium.). There is no rebound and no guarding.   Musculoskeletal:         General: No tenderness or edema. Normal range of motion.      Cervical back: Normal range of motion and neck supple.     Neurological: He is alert and oriented to person, place, and time. He has normal strength. No cranial nerve deficit or  sensory deficit. GCS score is 15. GCS eye subscore is 4. GCS verbal subscore is 5. GCS motor subscore is 6.   Skin: Skin is warm and dry. Capillary refill takes less than 2 seconds. No rash noted.   Psychiatric: He has a normal mood and affect.       ED Course   Procedures  Labs Reviewed   CBC W/ AUTO DIFFERENTIAL - Abnormal; Notable for the following components:       Result Value    RBC 3.51 (*)     Hemoglobin 8.2 (*)     Hematocrit 27.1 (*)     MCV 77 (*)     MCH 23.4 (*)     MCHC 30.3 (*)     RDW 18.3 (*)     All other components within normal limits   COMPREHENSIVE METABOLIC PANEL - Abnormal; Notable for the following components:    Glucose 121 (*)     BUN 5 (*)     Albumin 3.2 (*)     AST 9 (*)     ALT 5 (*)     All other components within normal limits   LIPASE - Abnormal; Notable for the following components:    Lipase 83 (*)     All other components within normal limits   URINALYSIS, REFLEX TO URINE CULTURE   ISTAT CHEM8          Imaging Results              CT Abdomen Pelvis With IV Contrast NO Oral Contrast (Final result)  Result time 12/29/23 04:27:20      Final result by Nimesh Stockton MD (12/29/23 04:27:20)                   Impression:      Sequela of pancreatitis with continued improvement in size of peripancreatic collections.  These collections contain very little residual fluid.  No evidence of a new peripancreatic collection or worsening pancreatitis.      Evidence concerning for portal hypertension including splenomegaly, dilation of the main portal vein, and collateral vessel formation through the upper abdomen.    Question wall thickening and inflammation about the transverse colon near the hepatic flexure, possibly suggesting nonspecific colitis, but persistent generalized intra-abdominal inflammatory changes related to recent pancreatitis are felt more likely.    Interval development of small bilateral pleural effusions with mild adjacent compressive atelectasis.    Moderate stool  burden throughout the colon.    Other findings are detailed above.    Electronically signed by resident: Sally Mcguire  Date:    12/29/2023  Time:    04:03    Electronically signed by: Nimesh Stockton MD  Date:    12/29/2023  Time:    04:27               Narrative:    EXAMINATION:  CT ABDOMEN PELVIS WITH IV CONTRAST    CLINICAL HISTORY:  Epigastric pain;Abdominal pain, acute, nonlocalized;    TECHNIQUE:  Axial images of the abdomen and pelvis were acquired after the use of 75 cc [id number] IV contrast.  Coronal and sagittal reconstructions were also obtained    COMPARISON:  CT 12/25/2023, 12/14/2023    FINDINGS:  Thoracic soft tissues: Partially visualized thoracic soft tissues appear unremarkable.      Heart: No pericardial effusion.    Lungs: Interval development of small bilateral pleural effusions with associated mild atelectasis.    Esophagus: Unremarkable.    Stomach and duodenum: Mild persistent antral wall thickening, somewhat improved from prior, possibly suggestive of gastritis.  Duodenum appears normal.    Liver: Hepatomegaly.  No focal hepatic lesion. Main portal vein is dilated to 1.5 cm.    Gallbladder: No calcified gallstones.    Bile Ducts: No evidence of dilated ducts.    Spleen: Splenomegaly.    Pancreas: Pancreas is atrophic.  Near complete resolution of multiple thick walled peripancreatic collections adjacent to the pancreatic body and extending inferiorly into the mesentery (2-66, 2-69, extending to 2-82).  No new peripancreatic collection.  No evidence of new or worsening pancreatitis.      Adrenals: Unremarkable.      Kidneys/Ureters: Normal in size and location. Normal enhancement. No hydronephrosis or nephrolithiasis. No ureteral dilatation.      Bladder: Mild circumferential bladder wall thickening, mildly improved from prior.      Reproductive organs: Unremarkable.    Bowel/Mesentery: Small bowel is normal in caliber with no evidence of obstruction.  Mild thickening and inflammatory change  about the transverse colon near the hepatic flexure, nonspecific and possibly related to recent changes of pancreatitis, though colitis may also be a consideration.  Moderate stool burden throughout the colon.  Appendix is normal.    Peritoneum: Decreased volume of free fluid compared to prior.  No free air.      Lymph nodes: Multiple prominent to enlarged lymph nodes in the retroperitoneum and upper abdomen, not significantly changed from prior.  None are definitively enlarged.      Abdominal wall:  Unremarkable.    Vasculature: No aneurysm. No significant calcific atherosclerosis.  Multiple collateral vessels through the upper abdomen.      Bones: No acute fracture. No suspicious osseous lesions.                                       Medications   HYDROmorphone injection 1 mg (1 mg Intravenous Given 12/29/23 0533)   HYDROmorphone injection 1 mg (1 mg Intravenous Given 12/29/23 0127)   lactated ringers bolus 1,000 mL (0 mLs Intravenous Stopped 12/29/23 0412)   ondansetron injection 4 mg (4 mg Intravenous Given 12/29/23 0127)   HYDROmorphone injection 1 mg (1 mg Intravenous Given 12/29/23 0228)   iohexoL (OMNIPAQUE 350) injection 75 mL (75 mLs Intravenous Given 12/29/23 0237)     Medical Decision Making  23-year-old male who appears to be in mild distress due to pain presents the ED complaining of epigastric abdominal pain.  ABC's intact.  Initial triage vital signs revealed hypertension and tachycardia.    Differential diagnosis includes but is not limited to pancreatitis, cholecystitis gastritis, appendicitis, electrolyte abnormality, anemia, UTI.    Amount and/or Complexity of Data Reviewed  Labs:  Decision-making details documented in ED Course.  Radiology: ordered.    Risk  Prescription drug management.              Attending Attestation:   Physician Attestation Statement for Resident:  As the supervising MD   Physician Attestation Statement: I have personally seen and examined this patient.   I agree with the  above history.  -:   As the supervising MD I agree with the above PE.     As the supervising MD I agree with the above treatment, course, plan, and disposition.    I have reviewed and agree with the residents interpretation of the following: lab data and CT scans.  I have reviewed the following: old records at this facility.              ED Course as of 12/29/23 0553   Fri Dec 29, 2023   0133 Following initial evaluation I will give the patient 1 L of lactated ringer bolus, 4 mg of IV Zofran and 1 mg of IV Dilaudid.  Patient has been to the emergency department many times before for chronic/necrotizing pancreatitis flare ups.  He states this symptomology is very similar to his prior flare-ups.  Will obtain labs and imaging pertaining to the patient's complaint. [BG]   0207 RN notified me that the patient is still complaining of severe pain.  I will give him an additional dose of 1 mg Dilaudid. [BG]   0207 CBC W/ AUTO DIFFERENTIAL(!)  No leukocytosis.  Stable anemia. [BG]   0224 Lipase(!): 83  Elevated which could relate to patient's pain. [BG]   0224 Comp. Metabolic Panel(!)  Grossly unremarkable. [BG]   0552 Patient is still in considerable pain.  I will give the patient an additional dose of Dilaudid for pain control.  I will admit patient to Hospital Medicine for further workup and evaluation of acute on chronic pancreatitis. [BG]      ED Course User Index  [BG] Michaela Cornelius MD                           Clinical Impression:  Final diagnoses:  [K85.90] Acute pancreatitis, unspecified complication status, unspecified pancreatitis type (Primary)          ED Disposition Condition    Observation Stable                Michaela Cornelius MD  Resident  12/29/23 0553       Roxy Torres MD  12/29/23 0559

## 2023-12-29 NOTE — ED TRIAGE NOTES
Tasia Cardona, a 23 y.o. male presents to the ED w/ complaint of abd pain, hx pancreatitis.     Triage note:  Chief Complaint   Patient presents with    Abdominal Pain     Possible pancreatitis flare up. LLQ abdominal pain. N/V     Review of patient's allergies indicates:   Allergen Reactions    [place] Swelling    Pcn [penicillins] Hives     Tolerates cephalosporins    Pecan nut Swelling    Sulfa (sulfonamide antibiotics) Hives     Past Medical History:   Diagnosis Date    Acute necrotizing pancreatitis 09/20/2023    Alcohol use disorder 10/05/2023    Asthma     Hepatitis C antibody positive in blood 09/18/2023 9/2023 PCR negative    HIV infection 10/2021    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

## 2023-12-29 NOTE — ASSESSMENT & PLAN NOTE
Mr. Cardona 23 y.o M with the PMH of necrotizing pancreatitis, splenic vein thrombosis, polycythemia vera, and HIV presents to the ED for worsening abdominal pain nausea and vomiting that started yesterday. Pt Is a bounce back to [facility] [employer]. Was D/C 12/28 due to medical optomization. Patient is opiate dependant and has an OP appointment 1/9. When asked what changed he said some nausea and vomitting after eating and his pain came back. In the ED Pt was Pulse 90 /79 98% on RA. WBC WNL HGB 8.2  Lipase 83. Ct shows pancreatitis with continued improvement in size of peripancreatic collections. No evidence of a new peripancreatic collection or worsening pancreatitis. Evidence concerning for portal hypertension including splenomegaly, dilation of the main portal vein, and collateral vessel formation through the upper abdomen. Question wall thickening and inflammation about the transverse colon near the hepatic flexure, possibly suggesting nonspecific colitis, but persistent generalized intra-abdominal inflammatory changes related to recent pancreatitis are felt more likely. Interval development of small bilateral pleural effusions with mild adjacent compressive atelectasis.  Pt is on Dilaudid 6q6hr, MS Cotin 30mg q12 hrs and  Lyrica 150 BID Patient was admitted to [facility] as a bounce back for pain control. Patient received Dilaudid, Zofran, and fluids in the ED.     Plan  Multimodal pain control  Pt not in acute distress  Tordol + heat packs  Fluids  Continue home meds when appropriate

## 2023-12-29 NOTE — HPI
Mr. Cardona 23 y.o M with the PMH of necrotizing pancreatitis, splenic vein thrombosis, polycythemia vera, and HIV presents to the ED for worsening abdominal pain nausea and vomiting that started yesterday. He wasn't able to keep any of his pain medicines down. He has very extensive history of repeated ED visits for this pain. He tried the morphine and dilaudid at home and they didn't help. He had 9/10 pain diffusely in the upper abdomen radiating to his shoulder. He ate food at chinese restaurant and after that started having N/V. He stopped drinking alcohol in September after his diagnosis of pancreatitis. He get phlebotomy for his PV every week sometimes every month depending on the severity of symptoms. He was on Eliquis before but not anymore. He had pancreatic stents placed and exchanged three times at [facility]. Last one was removed on 12/13. He has had extensive work up and endoscopies done int he past. Was found to have Corinne hitchcock tear after an episode of hematemesis. Patient was diagnosed with HIV 2 years ago and has been on anti retroviral therapy. He is sexually active with male partners. His most recent CD 4 count 2 months ago was 1080.      Pt Is a bounce back to [facility]. Was D/C 12/28 due to medical optomization. Patient is opiate dependant and has an OP appointment 1/9. When asked what changed he said some nausea and vomitting after eating and his pain came back. In the ED Pt was Pulse 90 /79 98% on RA. WBC WNL HGB 8.2  Lipase 83. Ct shows pancreatitis with continued improvement in size of peripancreatic collections. No evidence of a new peripancreatic collection or worsening pancreatitis. Evidence concerning for portal hypertension including splenomegaly, dilation of the main portal vein, and collateral vessel formation through the upper abdomen. Question wall thickening and inflammation about the transverse colon near the hepatic flexure, possibly suggesting nonspecific colitis, but  persistent generalized intra-abdominal inflammatory changes related to recent pancreatitis are felt more likely. Interval development of small bilateral pleural effusions with mild adjacent compressive atelectasis.  Pt is on Dilaudid 6q6hr, MS Cotin 30mg q12 hrs and  Lyrica 150 BID    Patient was admitted to [facility] as a bounce back for pain control. Patient received Dilaudid, Zofran, and fluids in the ED.

## 2023-12-29 NOTE — PROGRESS NOTES
[facility] - Emergency Dept  [facility] Medicine  Progress Note    Patient Name: Tasia Cardona  MRN: [id number]  Patient Class: OP- Observation   Admission Date: 12/29/2023  Length of Stay: 0 days  Attending Physician: Jimbo Flor MD  Primary Care Provider: Pina Jones NP        Subjective:     Principal Problem:Opiate dependence        HPI:  Mr. Cardona 23 y.o M with the PMH of necrotizing pancreatitis, splenic vein thrombosis, polycythemia vera, and HIV presents to the ED for worsening abdominal pain nausea and vomiting that started yesterday. He wasn't able to keep any of his pain medicines down. He has very extensive history of repeated ED visits for this pain. He tried the morphine and dilaudid at home and they didn't help. He had 9/10 pain diffusely in the upper abdomen radiating to his shoulder. He ate food at chinese restaurant and after that started having N/V. He stopped drinking alcohol in September after his diagnosis of pancreatitis. He get phlebotomy for his PV every week sometimes every month depending on the severity of symptoms. He was on Eliquis before but not anymore. He had pancreatic stents placed and exchanged three times at [facility]. Last one was removed on 12/13. He has had extensive work up and endoscopies done int he past. Was found to have Corinne hitchcock tear after an episode of hematemesis. Patient was diagnosed with HIV 2 years ago and has been on anti retroviral therapy. He is sexually active with male partners. His most recent CD 4 count 2 months ago was 1080.      Pt Is a bounce back to Team 3. Was D/C 12/28 due to medical optomization. Patient is opiate dependant and has an OP appointment 1/9. When asked what changed he said some nausea and vomitting after eating and his pain came back. In the ED Pt was Pulse 90 /79 98% on RA. WBC WNL HGB 8.2  Lipase 83. Ct shows pancreatitis with continued improvement in size of peripancreatic collections. No evidence of a new  peripancreatic collection or worsening pancreatitis. Evidence concerning for portal hypertension including splenomegaly, dilation of the main portal vein, and collateral vessel formation through the upper abdomen. Question wall thickening and inflammation about the transverse colon near the hepatic flexure, possibly suggesting nonspecific colitis, but persistent generalized intra-abdominal inflammatory changes related to recent pancreatitis are felt more likely. Interval development of small bilateral pleural effusions with mild adjacent compressive atelectasis.  Pt is on Dilaudid 6q6hr, MS Cotin 30mg q12 hrs and  Lyrica 150 BID    Patient was admitted to [facility] as a bounce back for pain control. Patient received Dilaudid, Zofran, and fluids in the ED.     Overview/Hospital Course:  No notes on file    Past Medical History:   Diagnosis Date    Acute necrotizing pancreatitis 09/20/2023    Alcohol use disorder 10/05/2023    Asthma     Hepatitis C antibody positive in blood 09/18/2023 9/2023 PCR negative    HIV infection 10/2021    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       Past Surgical History:   Procedure Laterality Date    ENDOSCOPIC ULTRASOUND OF UPPER GASTROINTESTINAL TRACT N/A 10/23/2023    Procedure: ULTRASOUND, UPPER GI TRACT, ENDOSCOPIC;  Surgeon: Emil Villatoro MD;  Location: [address]);  Service: Endoscopy;  Laterality: N/A;    ERCP N/A 10/23/2023    Procedure: ERCP (ENDOSCOPIC RETROGRADE CHOLANGIOPANCREATOGRAPHY);  Surgeon: Emil Villatoro MD;  Location: [address]);  Service: Endoscopy;  Laterality: N/A;    ESOPHAGOGASTRODUODENOSCOPY N/A 9/18/2023    Procedure: EGD (ESOPHAGOGASTRODUODENOSCOPY);  Surgeon: Kg Cleaning MD;  Location: [address]);  Service: Endoscopy;   Laterality: N/A;       Review of patient's allergies indicates:   Allergen Reactions    [place] Swelling    Pcn [penicillins] Hives     Tolerates cephalosporins    Pecan nut Swelling    Sulfa (sulfonamide antibiotics) Hives       Current Facility-Administered Medications on File Prior to Encounter   Medication    [DISCONTINUED] acetaminophen tablet 1,000 mg    [DISCONTINUED] wdimrayrg-bhmbhgvd-veumlsm ala -25 mg (25 kg or greater) 1 tablet    [DISCONTINUED] dextrose 10% bolus 125 mL 125 mL    [DISCONTINUED] dextrose 10% bolus 250 mL 250 mL    [DISCONTINUED] folic acid tablet 1 mg    [DISCONTINUED] glucagon (human recombinant) injection 1 mg    [DISCONTINUED] glucose chewable tablet 16 g    [DISCONTINUED] glucose chewable tablet 24 g    [DISCONTINUED] HYDROmorphone injection 0.5 mg    [DISCONTINUED] HYDROmorphone tablet 4 mg    [DISCONTINUED] HYDROmorphone tablet 6 mg    [DISCONTINUED] ibuprofen tablet 200 mg    [DISCONTINUED] melatonin tablet 3 mg    [DISCONTINUED] methocarbamoL tablet 500 mg    [DISCONTINUED] metoclopramide injection 5 mg    [DISCONTINUED] morphine 12 hr tablet 30 mg    [DISCONTINUED] naloxone 0.4 mg/mL injection 0.02 mg    [DISCONTINUED] ondansetron injection 4 mg    [DISCONTINUED] pantoprazole EC tablet 40 mg    [DISCONTINUED] polyethylene glycol packet 17 g    [DISCONTINUED] pregabalin capsule 150 mg    [DISCONTINUED] prochlorperazine injection Soln 2.5 mg    [DISCONTINUED] senna-docusate 8.6-50 mg per tablet 2 tablet    [DISCONTINUED] sodium chloride 0.9% flush 10 mL     Current Outpatient Medications on File Prior to Encounter   Medication Sig    tcgjrssxx-osqelbvj-hslnjrl ala (BIKTARVY) -25 mg (25 kg or greater) Take 1 tablet by mouth once daily.    folic acid (FOLVITE) 1 MG tablet Take 1 tablet (1 mg total) by mouth once daily.    [] HYDROmorphone (DILAUDID) 4 MG tablet Take 1.5 tablets (6 mg total) by mouth every 6 (six) hours as needed for Pain (Severe pain 8-10).     ketorolac (TORADOL) 10 mg tablet Take 1 tablet (10 mg total) by mouth every 6 (six) hours. for 5 days    methocarbamoL (ROBAXIN) 500 MG Tab Take 1 tablet (500 mg total) by mouth 4 (four) times daily. for 14 days    [] morphine (MS CONTIN) 30 MG 12 hr tablet Take 1 tablet (30 mg total) by mouth every 12 (twelve) hours. for 7 days    pantoprazole (PROTONIX) 40 MG tablet Take 1 tablet (40 mg total) by mouth 2 (two) times daily.    polyethylene glycol (GLYCOLAX) 17 gram/dose powder Use cap to measure out (17 g) mix with a liquid and take by mouth once daily for 21 days.    pregabalin (LYRICA) 150 MG capsule Take 1 capsule (150 mg total) by mouth 2 (two) times daily. for 14 days    [] promethazine (PHENERGAN) 25 MG tablet Take 1 tablet (25 mg total) by mouth every 6 (six) hours as needed.    senna-docusate 8.6-50 mg (PERICOLACE) 8.6-50 mg per tablet Take 2 tablets by mouth 2 (two) times daily as needed for Constipation.     Family History       Problem Relation (Age of Onset)    Breast cancer Maternal Grandmother    Cancer Mother, Brother    No Known Problems Father    Ovarian cancer Mother    Pancreatitis Mother          Tobacco Use    Smoking status: Former     Types: Cigarettes    Smokeless tobacco: Never   Substance and Sexual Activity    Alcohol use: Not Currently    Drug use: Never    Sexual activity: Not on file     Review of Systems   Constitutional:  Negative for chills, fatigue and fever.   HENT: Negative.     Eyes: Negative.    Cardiovascular:  Negative for chest pain and leg swelling.   Gastrointestinal:  Positive for abdominal pain, nausea and vomiting. Negative for diarrhea.   Endocrine: Negative for polydipsia and polyphagia.   Genitourinary:  Negative for frequency, hematuria and urgency.   Musculoskeletal:  Negative for arthralgias and myalgias.   Skin:  Negative for rash.   Psychiatric/Behavioral:  Negative for agitation and behavioral problems. The patient is not nervous/anxious.       Objective:     Vital Signs (Most Recent):  Temp: 98.7 °F (37.1 °C) (12/29/23 0634)  Pulse: 90 (12/29/23 0634)  Resp: 18 (12/29/23 0634)  BP: 132/79 (12/29/23 0634)  SpO2: 98 % (12/29/23 0634) Vital Signs (24h Range):  Temp:  [98 °F (36.7 °C)-99.9 °F (37.7 °C)] 98.7 °F (37.1 °C)  Pulse:  [] 90  Resp:  [16-20] 18  SpO2:  [95 %-98 %] 98 %  BP: (111-146)/(62-79) 132/79     Weight: 63.5 kg (140 lb)  Body mass index is 20.09 kg/m².     Physical Exam  Constitutional:       General: He is not in acute distress.     Appearance: Normal appearance. He is not ill-appearing, toxic-appearing or diaphoretic.      Comments: Comfortable resting in bed   HENT:      Head: Atraumatic.      Mouth/Throat:      Mouth: Mucous membranes are moist.   Eyes:      Extraocular Movements: Extraocular movements intact.   Abdominal:      General: Abdomen is flat. Bowel sounds are normal. There is no distension.      Palpations: Abdomen is soft. There is no mass.      Tenderness: There is no abdominal tenderness. There is no guarding or rebound.      Hernia: No hernia is present.      Comments: Diffuse upper abdominal tenderness. Scar marks on lower belly from previous accident.   Musculoskeletal:      Right lower leg: No edema.      Left lower leg: No edema.   Skin:     General: Skin is warm.      Findings: No bruising.   Neurological:      General: No focal deficit present.      Mental Status: He is alert and oriented to person, place, and time.   Psychiatric:         Mood and Affect: Mood normal.         Behavior: Behavior normal.         Thought Content: Thought content normal.         Judgment: Judgment normal.                Significant Labs: All pertinent labs within the past 24 hours have been reviewed.    Significant Imaging: I have reviewed all pertinent imaging results/findings within the past 24 hours.    Assessment/Plan:      * Opiate dependence  Mr. Cardona 23 y.o M with the PMH of necrotizing pancreatitis, splenic vein  thrombosis, polycythemia vera, and HIV presents to the ED for worsening abdominal pain nausea and vomiting that started yesterday. Pt Is a bounce back to Team 3. Was D/C 12/28 due to medical optomization. Patient is opiate dependant and has an OP appointment 1/9. When asked what changed he said some nausea and vomitting after eating and his pain came back. In the ED Pt was Pulse 90 /79 98% on RA. WBC WNL HGB 8.2  Lipase 83. Ct shows pancreatitis with continued improvement in size of peripancreatic collections. No evidence of a new peripancreatic collection or worsening pancreatitis. Evidence concerning for portal hypertension including splenomegaly, dilation of the main portal vein, and collateral vessel formation through the upper abdomen. Question wall thickening and inflammation about the transverse colon near the hepatic flexure, possibly suggesting nonspecific colitis, but persistent generalized intra-abdominal inflammatory changes related to recent pancreatitis are felt more likely. Interval development of small bilateral pleural effusions with mild adjacent compressive atelectasis.  Pt is on Dilaudid 6q6hr, MS Cotin 30mg q12 hrs and  Lyrica 150 BID Patient was admitted to [facility] as a bounce back for pain control. Patient received Dilaudid, Zofran, and fluids in the ED.     Plan  Multimodal pain control  Pt not in acute distress  Tordol + heat packs  Fluids  Continue home meds      Necrotizing pancreatitis  Stable, improved from last admission.    See Opiate dependance        VTE Risk Mitigation (From admission, onward)           Ordered     IP VTE HIGH RISK PATIENT  Once         12/29/23 0626     Place sequential compression device  Until discontinued         12/29/23 0626                    Discharge Planning   CASTILLO: 12/29/2023     Code Status: Full Code   Is the patient medically ready for discharge?: Yes    Reason for patient still in hospital (select all that apply): Other (specify) Pending D/C                      Davin Hedrick DO  Department of Hospital Medicine   [facility] - Emergency Dept

## 2023-12-29 NOTE — HOSPITAL COURSE
Admitted to Hospital medicine team 3 for Chronic Pancreatitis. D/C yesterday afternoon after medical optomization. From a medical standpoint, his scans show improvement of his Pancreatitis. HDS. Labs unremarkable. Patient resting comfortably in bed at time of exam. Stable for D/C pending Staff evaluation.

## 2023-12-30 ENCOUNTER — HOSPITAL ENCOUNTER (EMERGENCY)
Facility: OTHER | Age: 23
Discharge: HOME | End: 2023-12-30
Attending: EMERGENCY MEDICINE
Payer: MEDICAID

## 2023-12-30 VITALS
HEART RATE: 79 BPM | HEIGHT: 70 IN | DIASTOLIC BLOOD PRESSURE: 74 MMHG | WEIGHT: 140 LBS | BODY MASS INDEX: 20.04 KG/M2 | RESPIRATION RATE: 16 BRPM | OXYGEN SATURATION: 98 % | SYSTOLIC BLOOD PRESSURE: 122 MMHG | TEMPERATURE: 98 F

## 2023-12-30 DIAGNOSIS — R10.10 UPPER ABDOMINAL PAIN: Primary | ICD-10-CM

## 2023-12-30 DIAGNOSIS — K86.1 CHRONIC PANCREATITIS, UNSPECIFIED PANCREATITIS TYPE: ICD-10-CM

## 2023-12-30 LAB
ALBUMIN SERPL BCP-MCNC: 3.5 G/DL (ref 3.5–5.2)
ALP SERPL-CCNC: 82 U/L (ref 55–135)
ALT SERPL W/O P-5'-P-CCNC: 9 U/L (ref 10–44)
ANION GAP SERPL CALC-SCNC: 12 MMOL/L (ref 8–16)
AST SERPL-CCNC: 14 U/L (ref 10–40)
BASOPHILS # BLD AUTO: 0.04 K/UL (ref 0–0.2)
BASOPHILS NFR BLD: 0.5 % (ref 0–1.9)
BILIRUB SERPL-MCNC: 0.3 MG/DL (ref 0.1–1)
BILIRUB UR QL STRIP: NEGATIVE
BUN SERPL-MCNC: 5 MG/DL (ref 6–20)
CALCIUM SERPL-MCNC: 9.4 MG/DL (ref 8.7–10.5)
CHLORIDE SERPL-SCNC: 105 MMOL/L (ref 95–110)
CLARITY UR: CLEAR
CO2 SERPL-SCNC: 22 MMOL/L (ref 23–29)
COLOR UR: YELLOW
CREAT SERPL-MCNC: 0.7 MG/DL (ref 0.5–1.4)
DIFFERENTIAL METHOD BLD: ABNORMAL
EOSINOPHIL # BLD AUTO: 0.2 K/UL (ref 0–0.5)
EOSINOPHIL NFR BLD: 2.9 % (ref 0–8)
ERYTHROCYTE [DISTWIDTH] IN BLOOD BY AUTOMATED COUNT: 18.4 % (ref 11.5–14.5)
EST. GFR  (NO RACE VARIABLE): >60 ML/MIN/1.73 M^2
GLUCOSE SERPL-MCNC: 109 MG/DL (ref 70–110)
GLUCOSE UR QL STRIP: NEGATIVE
HCT VFR BLD AUTO: 32 % (ref 40–54)
HGB BLD-MCNC: 9.1 G/DL (ref 14–18)
HGB UR QL STRIP: NEGATIVE
IMM GRANULOCYTES # BLD AUTO: 0.02 K/UL (ref 0–0.04)
IMM GRANULOCYTES NFR BLD AUTO: 0.2 % (ref 0–0.5)
KETONES UR QL STRIP: NEGATIVE
LEUKOCYTE ESTERASE UR QL STRIP: NEGATIVE
LIPASE SERPL-CCNC: 102 U/L (ref 4–60)
LYMPHOCYTES # BLD AUTO: 2.6 K/UL (ref 1–4.8)
LYMPHOCYTES NFR BLD: 30.9 % (ref 18–48)
MCH RBC QN AUTO: 23.1 PG (ref 27–31)
MCHC RBC AUTO-ENTMCNC: 28.4 G/DL (ref 32–36)
MCV RBC AUTO: 81 FL (ref 82–98)
MONOCYTES # BLD AUTO: 0.6 K/UL (ref 0.3–1)
MONOCYTES NFR BLD: 7.1 % (ref 4–15)
NEUTROPHILS # BLD AUTO: 4.9 K/UL (ref 1.8–7.7)
NEUTROPHILS NFR BLD: 58.4 % (ref 38–73)
NITRITE UR QL STRIP: NEGATIVE
NRBC BLD-RTO: 0 /100 WBC
PH UR STRIP: 8 [PH] (ref 5–8)
PLATELET # BLD AUTO: 359 K/UL (ref 150–450)
PMV BLD AUTO: 9.2 FL (ref 9.2–12.9)
POTASSIUM SERPL-SCNC: 4.1 MMOL/L (ref 3.5–5.1)
PROT SERPL-MCNC: 8.2 G/DL (ref 6–8.4)
PROT UR QL STRIP: NEGATIVE
RBC # BLD AUTO: 3.94 M/UL (ref 4.6–6.2)
SODIUM SERPL-SCNC: 139 MMOL/L (ref 136–145)
SP GR UR STRIP: 1.01 (ref 1–1.03)
URN SPEC COLLECT METH UR: NORMAL
UROBILINOGEN UR STRIP-ACNC: NEGATIVE EU/DL
WBC # BLD AUTO: 8.31 K/UL (ref 3.9–12.7)

## 2023-12-30 PROCEDURE — 63600175 PHARM REV CODE 636 W HCPCS: Performed by: PHYSICIAN ASSISTANT

## 2023-12-30 PROCEDURE — 25000003 PHARM REV CODE 250: Performed by: PHYSICIAN ASSISTANT

## 2023-12-30 PROCEDURE — 80053 COMPREHEN METABOLIC PANEL: CPT | Performed by: PHYSICIAN ASSISTANT

## 2023-12-30 PROCEDURE — 81003 URINALYSIS AUTO W/O SCOPE: CPT | Performed by: PHYSICIAN ASSISTANT

## 2023-12-30 PROCEDURE — 85025 COMPLETE CBC W/AUTO DIFF WBC: CPT | Performed by: PHYSICIAN ASSISTANT

## 2023-12-30 PROCEDURE — 36415 COLL VENOUS BLD VENIPUNCTURE: CPT | Performed by: PHYSICIAN ASSISTANT

## 2023-12-30 PROCEDURE — G0378 HOSPITAL OBSERVATION PER HR: HCPCS

## 2023-12-30 PROCEDURE — 96375 TX/PRO/DX INJ NEW DRUG ADDON: CPT

## 2023-12-30 PROCEDURE — 96374 THER/PROPH/DIAG INJ IV PUSH: CPT

## 2023-12-30 PROCEDURE — 83690 ASSAY OF LIPASE: CPT | Performed by: PHYSICIAN ASSISTANT

## 2023-12-30 PROCEDURE — 96376 TX/PRO/DX INJ SAME DRUG ADON: CPT

## 2023-12-30 PROCEDURE — 99284 EMERGENCY DEPT VISIT MOD MDM: CPT | Mod: 25

## 2023-12-30 RX ORDER — HYDROMORPHONE HYDROCHLORIDE 1 MG/ML
1 INJECTION, SOLUTION INTRAMUSCULAR; INTRAVENOUS; SUBCUTANEOUS
Status: COMPLETED | OUTPATIENT
Start: 2023-12-30 | End: 2023-12-30

## 2023-12-30 RX ORDER — PROMETHAZINE HYDROCHLORIDE 25 MG/1
25 SUPPOSITORY RECTAL EVERY 6 HOURS PRN
Qty: 12 SUPPOSITORY | Refills: 0 | Status: SHIPPED | OUTPATIENT
Start: 2023-12-30

## 2023-12-30 RX ORDER — KETOROLAC TROMETHAMINE 30 MG/ML
15 INJECTION, SOLUTION INTRAMUSCULAR; INTRAVENOUS
Status: COMPLETED | OUTPATIENT
Start: 2023-12-30 | End: 2023-12-30

## 2023-12-30 RX ORDER — ONDANSETRON 2 MG/ML
4 INJECTION INTRAMUSCULAR; INTRAVENOUS
Status: COMPLETED | OUTPATIENT
Start: 2023-12-30 | End: 2023-12-30

## 2023-12-30 RX ADMIN — HYDROMORPHONE HYDROCHLORIDE 1 MG: 1 INJECTION, SOLUTION INTRAMUSCULAR; INTRAVENOUS; SUBCUTANEOUS at 10:12

## 2023-12-30 RX ADMIN — HYDROMORPHONE HYDROCHLORIDE 1 MG: 1 INJECTION, SOLUTION INTRAMUSCULAR; INTRAVENOUS; SUBCUTANEOUS at 08:12

## 2023-12-30 RX ADMIN — SODIUM CHLORIDE 1000 ML: 9 INJECTION, SOLUTION INTRAVENOUS at 08:12

## 2023-12-30 RX ADMIN — KETOROLAC TROMETHAMINE 15 MG: 30 INJECTION, SOLUTION INTRAMUSCULAR at 09:12

## 2023-12-30 RX ADMIN — ONDANSETRON 4 MG: 2 INJECTION INTRAMUSCULAR; INTRAVENOUS at 08:12

## 2023-12-30 NOTE — FIRST PROVIDER EVALUATION
Emergency Department TeleTriage Encounter Note      CHIEF COMPLAINT    Chief Complaint   Patient presents with    Abdominal Pain     C/o generalized abdominal pain with n/v since last night. PMH Pancreatitis        VITAL SIGNS   Initial Vitals [12/30/23 1733]   BP Pulse Resp Temp SpO2   109/72 (!) 115 16 98.2 °F (36.8 °C) 100 %      MAP       --            ALLERGIES    Review of patient's allergies indicates:   Allergen Reactions    [place] Swelling    Pcn [penicillins] Hives     Tolerates cephalosporins    Pecan nut Swelling    Sulfa (sulfonamide antibiotics) Hives       PROVIDER TRIAGE NOTE      23 year old male presents with abdominal pain. Hx of necrotizing pancreatitis. D/c from hospital yesterday. Reports n/v- no fever    PE:  Patient alert and oriented. No acute distress    Initial orders will be placed and care will be transferred to an alternate provider when patient is roomed for a full evaluation. Any additional orders and the final disposition will be determined by that provider.        ORDERS  Labs Reviewed - No data to display    ED Orders (720h ago, onward)      None              Virtual Visit Note: The provider triage portion of this emergency department evaluation and documentation was performed via [employer], a HIPAA-compliant telemedicine application, in concert with a tele-presenter in the room. A face to face patient evaluation with one of my colleagues will occur once the patient is placed in an emergency department room.      DISCLAIMER: This note was prepared with [employer] voice recognition transcription software. Garbled syntax, mangled pronouns, and other bizarre constructions may be attributed to that software system.

## 2023-12-31 ENCOUNTER — HOSPITAL ENCOUNTER (EMERGENCY)
Facility: HOSPITAL | Age: 23
Discharge: HOME | End: 2024-01-01
Attending: EMERGENCY MEDICINE
Payer: MEDICAID

## 2023-12-31 DIAGNOSIS — R11.2 NAUSEA AND VOMITING, UNSPECIFIED VOMITING TYPE: Primary | ICD-10-CM

## 2023-12-31 DIAGNOSIS — R10.13 EPIGASTRIC PAIN: ICD-10-CM

## 2023-12-31 LAB — LACTATE SERPL-SCNC: 1.9 MMOL/L (ref 0.5–2.2)

## 2023-12-31 PROCEDURE — 96376 TX/PRO/DX INJ SAME DRUG ADON: CPT

## 2023-12-31 PROCEDURE — 96361 HYDRATE IV INFUSION ADD-ON: CPT

## 2023-12-31 PROCEDURE — 96375 TX/PRO/DX INJ NEW DRUG ADDON: CPT

## 2023-12-31 PROCEDURE — 83690 ASSAY OF LIPASE: CPT | Performed by: EMERGENCY MEDICINE

## 2023-12-31 PROCEDURE — 63600175 PHARM REV CODE 636 W HCPCS: Performed by: STUDENT IN AN ORGANIZED HEALTH CARE EDUCATION/TRAINING PROGRAM

## 2023-12-31 PROCEDURE — 96374 THER/PROPH/DIAG INJ IV PUSH: CPT

## 2023-12-31 PROCEDURE — 99284 EMERGENCY DEPT VISIT MOD MDM: CPT | Mod: 25

## 2023-12-31 PROCEDURE — 83605 ASSAY OF LACTIC ACID: CPT | Performed by: EMERGENCY MEDICINE

## 2023-12-31 PROCEDURE — 80053 COMPREHEN METABOLIC PANEL: CPT | Performed by: EMERGENCY MEDICINE

## 2023-12-31 PROCEDURE — 85025 COMPLETE CBC W/AUTO DIFF WBC: CPT | Performed by: EMERGENCY MEDICINE

## 2023-12-31 RX ORDER — PROCHLORPERAZINE EDISYLATE 5 MG/ML
5 INJECTION INTRAMUSCULAR; INTRAVENOUS
Status: COMPLETED | OUTPATIENT
Start: 2023-12-31 | End: 2023-12-31

## 2023-12-31 RX ORDER — HYDROMORPHONE HYDROCHLORIDE 1 MG/ML
2 INJECTION, SOLUTION INTRAMUSCULAR; INTRAVENOUS; SUBCUTANEOUS
Status: COMPLETED | OUTPATIENT
Start: 2023-12-31 | End: 2023-12-31

## 2023-12-31 RX ORDER — HYDROMORPHONE HYDROCHLORIDE 8 MG/1
6 TABLET ORAL EVERY 4 HOURS PRN
Status: ON HOLD | COMMUNITY
End: 2024-03-09

## 2023-12-31 RX ADMIN — SODIUM CHLORIDE, POTASSIUM CHLORIDE, SODIUM LACTATE AND CALCIUM CHLORIDE 1000 ML: 600; 310; 30; 20 INJECTION, SOLUTION INTRAVENOUS at 11:12

## 2023-12-31 RX ADMIN — HYDROMORPHONE HYDROCHLORIDE 2 MG: 1 INJECTION, SOLUTION INTRAMUSCULAR; INTRAVENOUS; SUBCUTANEOUS at 11:12

## 2023-12-31 RX ADMIN — PROCHLORPERAZINE EDISYLATE 5 MG: 5 INJECTION INTRAMUSCULAR; INTRAVENOUS at 11:12

## 2023-12-31 NOTE — ED TRIAGE NOTES
Tasia Cardona, an 23 y.o. male presents to the ED with abd pain, nausea, vomiting since last night. Hx of pancreatitis       Chief Complaint   Patient presents with    Abdominal Pain     C/o generalized abdominal pain with n/v since last night. PMH Pancreatitis      Review of patient's allergies indicates:   Allergen Reactions    [place] Swelling    Pcn [penicillins] Hives     Tolerates cephalosporins    Pecan nut Swelling    Sulfa (sulfonamide antibiotics) Hives     Past Medical History:   Diagnosis Date    Acute necrotizing pancreatitis 09/20/2023    Alcohol use disorder 10/05/2023    Asthma     Hepatitis C antibody positive in blood 09/18/2023 9/2023 PCR negative    HIV infection 10/2021    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

## 2023-12-31 NOTE — ED PROVIDER NOTES
Source of History:  Patient     Chief complaint:  Abdominal Pain (C/o generalized abdominal pain with n/v since last night. PMH Pancreatitis )      HPI:  Tasia Cardona is a 23 y.o. male with necrotizing pancreatitis( S/p EUS, and EGD w/ necrostomy, had pancreatic stent removed at [facility] on 12/13), polycythemia vera, HIV  (last CD4 1080) who is presenting to the emergency department with epigastric pain.  He was seen at [facility] yesterday and was admitted for acute on chronic pancreatitis but had to leave shortly after to take make care arrangements for his dog.  He states pain is now worsening, located to epigastric region and radiating to left back/left shoulder.  He reports persistent nausea and vomiting, proximally 6-7 episodes since last night, unable to tolerate pain medicine.  He denies alcohol use.  He believes eating cheese caused exacerbation of his symptoms.      ROS: As per HPI     Review of patient's allergies indicates:   Allergen Reactions    [place] Swelling    Pcn [penicillins] Hives     Tolerates cephalosporins    Pecan nut Swelling    Sulfa (sulfonamide antibiotics) Hives       PMH:  As per HPI and below:  Past Medical History:   Diagnosis Date    Acute necrotizing pancreatitis 09/20/2023    Alcohol use disorder 10/05/2023    Asthma     Hepatitis C antibody positive in blood 09/18/2023 9/2023 PCR negative    HIV infection 10/2021    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     Past Surgical History:   Procedure Laterality Date    ENDOSCOPIC ULTRASOUND OF UPPER GASTROINTESTINAL TRACT N/A 10/23/2023    Procedure: ULTRASOUND, UPPER GI TRACT, ENDOSCOPIC;  Surgeon: Emil Villatoro MD;  Location: [facility] ([address] [place] [address]);  Service: Endoscopy;  Laterality: N/A;    ERCP N/A 10/23/2023    Procedure:  "ERCP (ENDOSCOPIC RETROGRADE CHOLANGIOPANCREATOGRAPHY);  Surgeon: Emil Villatoro MD;  Location: [facility] ([address] [place] [address]);  Service: Endoscopy;  Laterality: N/A;    ESOPHAGOGASTRODUODENOSCOPY N/A 9/18/2023    Procedure: EGD (ESOPHAGOGASTRODUODENOSCOPY);  Surgeon: Kg Cleaning MD;  [facility]  Service: Endoscopy;  Laterality: N/A;       Social History     Tobacco Use    Smoking status: Former     Types: Cigarettes    Smokeless tobacco: Never   Substance Use Topics    Alcohol use: Not Currently    Drug use: Never       Physical Exam:    /72 (BP Location: Right arm, Patient Position: Sitting)   Pulse (!) 115   Temp 98.2 °F (36.8 °C) (Oral)   Resp 16   Ht 5' 10" (1.778 m)   Wt 63.5 kg (140 lb)   SpO2 100%   BMI 20.09 kg/m²     Nursing note and vital signs reviewed.    Appearance: No acute distress. Non toxic appearing.   Eyes: No conjunctival injection.  HENT: Oropharynx clear. Moist mucous membranes.    Chest/ Respiratory: Clear to auscultation bilaterally.  Good air movement.  No wheezes.  No rhonchi. No rales. No accessory muscle use.  Cardiovascular: Regular rate and rhythm.  No murmurs. No gallops. No rubs.  Abdomen: Soft.  Not distended.  Epigastric, left upper quadrant and left CVA tenderness.  No guarding.  No rebound. Non-peritoneal.  Musculoskeletal: Good range of motion all joints.  No deformities.  Neck supple.  No meningismus.  Skin: No rashes seen.  Good turgor.  No abrasions.  No ecchymoses.  Neurologic: Motor intact.  Sensation intact.  Mental Status:  Alert and oriented x 3.  Appropriate, conversant.     Medical Decision Making  23-year-old male with necrotizing pancreatitis who is presenting to the emergency department with epigastric pain, symptoms similar to previous flare-ups of pancreatitis.  Had recommended admission at [facility] yesterday but had to leave early.  He is afebrile, nontoxic appearing hemodynamically stable.    Differential diagnosis includes:  " Pancreatitis flare up, abscess, perforation, gastritis, pyelonephritis.    Amount and/or Complexity of Data Reviewed  External Data Reviewed: labs, radiology and notes.     Details: Reviewed ED workup: admission from [facility] yesterday  Lipase 83   CT abd pelvis:  Sequela of pancreatitis with continued improvement in size of peripancreatic collections. These collections contain very little residual fluid. No evidence of a new peripancreatic collection or worsening pancreatitis.  Evidence concerning for portal hypertension including splenomegaly, dilation of the main portal vein, and collateral vessel formation through the upper abdomen.  Question wall thickening and inflammation about the transverse colon near the hepatic flexure, possibly suggesting nonspecific colitis, but persistent generalized intra-abdominal inflammatory changes related to recent pancreatitis are felt more likely.    Risk  Prescription drug management.         ED Course as of 12/31/23 0933   Sat Dec 30, 2023   2036 No leukocytosis, stable anemia. [AG]   2036 Urinalysis without signs of infection [AG]   2216 Chemistry without major metabolic derangement.  Lipase slightly elevated but not within 3 upper limits of normal.  Reviewed recent admission and discharge summary, patient's labs and images significantly improving.  I do not see need for repeat admission at this time. He has follow-up with pain management. [AG]      ED Course User Index  [AG] Ghanshyam Felix PA-C             Diagnostic Impression:    1. Upper abdominal pain    2. Chronic pancreatitis, unspecified pancreatitis type             This note was created using [employer] Fluency Direct. There may be typographical errors secondary to dictation.        Ghanshyam Felix PA-C  12/31/23 0936

## 2024-01-01 ENCOUNTER — HOSPITAL ENCOUNTER (EMERGENCY)
Facility: HOSPITAL | Age: 24
Discharge: HOME | End: 2024-01-02
Attending: EMERGENCY MEDICINE

## 2024-01-01 VITALS
OXYGEN SATURATION: 97 % | HEART RATE: 76 BPM | SYSTOLIC BLOOD PRESSURE: 116 MMHG | DIASTOLIC BLOOD PRESSURE: 66 MMHG | RESPIRATION RATE: 18 BRPM | TEMPERATURE: 99 F

## 2024-01-01 DIAGNOSIS — R10.9 ABDOMINAL PAIN: ICD-10-CM

## 2024-01-01 DIAGNOSIS — K86.1 CHRONIC PANCREATITIS, UNSPECIFIED PANCREATITIS TYPE: Primary | ICD-10-CM

## 2024-01-01 PROBLEM — U07.1 COVID: Status: ACTIVE | Noted: 2023-12-02

## 2024-01-01 PROBLEM — K85.90 PANCREATITIS: Status: ACTIVE | Noted: 2023-11-21

## 2024-01-01 PROBLEM — K22.6 MALLORY-WEISS TEAR: Status: ACTIVE | Noted: 2024-01-01

## 2024-01-01 PROBLEM — K86.89 PERIPANCREATIC FLUID COLLECTION: Status: ACTIVE | Noted: 2023-11-27

## 2024-01-01 LAB
ALBUMIN SERPL BCP-MCNC: 3.4 G/DL (ref 3.5–5.2)
ALBUMIN SERPL BCP-MCNC: 3.7 G/DL (ref 3.5–5.2)
ALP SERPL-CCNC: 85 U/L (ref 55–135)
ALP SERPL-CCNC: 91 U/L (ref 55–135)
ALT SERPL W/O P-5'-P-CCNC: 7 U/L (ref 10–44)
ALT SERPL W/O P-5'-P-CCNC: 8 U/L (ref 10–44)
ANION GAP SERPL CALC-SCNC: 13 MMOL/L (ref 8–16)
ANION GAP SERPL CALC-SCNC: 14 MMOL/L (ref 8–16)
AST SERPL-CCNC: 13 U/L (ref 10–40)
AST SERPL-CCNC: 15 U/L (ref 10–40)
BASOPHILS # BLD AUTO: 0.05 K/UL (ref 0–0.2)
BASOPHILS # BLD AUTO: 0.05 K/UL (ref 0–0.2)
BASOPHILS NFR BLD: 0.5 % (ref 0–1.9)
BASOPHILS NFR BLD: 0.6 % (ref 0–1.9)
BILIRUB SERPL-MCNC: 0.4 MG/DL (ref 0.1–1)
BILIRUB SERPL-MCNC: 0.4 MG/DL (ref 0.1–1)
BUN SERPL-MCNC: 4 MG/DL (ref 6–20)
BUN SERPL-MCNC: 7 MG/DL (ref 6–20)
CALCIUM SERPL-MCNC: 9.1 MG/DL (ref 8.7–10.5)
CALCIUM SERPL-MCNC: 9.1 MG/DL (ref 8.7–10.5)
CHLORIDE SERPL-SCNC: 105 MMOL/L (ref 95–110)
CHLORIDE SERPL-SCNC: 108 MMOL/L (ref 95–110)
CO2 SERPL-SCNC: 21 MMOL/L (ref 23–29)
CO2 SERPL-SCNC: 25 MMOL/L (ref 23–29)
CREAT SERPL-MCNC: 0.7 MG/DL (ref 0.5–1.4)
CREAT SERPL-MCNC: 0.8 MG/DL (ref 0.5–1.4)
DIFFERENTIAL METHOD BLD: ABNORMAL
DIFFERENTIAL METHOD BLD: ABNORMAL
EOSINOPHIL # BLD AUTO: 0.2 K/UL (ref 0–0.5)
EOSINOPHIL # BLD AUTO: 0.2 K/UL (ref 0–0.5)
EOSINOPHIL NFR BLD: 2.2 % (ref 0–8)
EOSINOPHIL NFR BLD: 2.2 % (ref 0–8)
ERYTHROCYTE [DISTWIDTH] IN BLOOD BY AUTOMATED COUNT: 18.1 % (ref 11.5–14.5)
ERYTHROCYTE [DISTWIDTH] IN BLOOD BY AUTOMATED COUNT: 18.3 % (ref 11.5–14.5)
EST. GFR  (NO RACE VARIABLE): >60 ML/MIN/1.73 M^2
EST. GFR  (NO RACE VARIABLE): >60 ML/MIN/1.73 M^2
GLUCOSE SERPL-MCNC: 96 MG/DL (ref 70–110)
GLUCOSE SERPL-MCNC: 99 MG/DL (ref 70–110)
HCT VFR BLD AUTO: 32.6 % (ref 40–54)
HCT VFR BLD AUTO: 33.1 % (ref 40–54)
HGB BLD-MCNC: 9.4 G/DL (ref 14–18)
HGB BLD-MCNC: 9.6 G/DL (ref 14–18)
IMM GRANULOCYTES # BLD AUTO: 0.03 K/UL (ref 0–0.04)
IMM GRANULOCYTES # BLD AUTO: 0.03 K/UL (ref 0–0.04)
IMM GRANULOCYTES NFR BLD AUTO: 0.3 % (ref 0–0.5)
IMM GRANULOCYTES NFR BLD AUTO: 0.4 % (ref 0–0.5)
LIPASE SERPL-CCNC: 34 U/L (ref 4–60)
LIPASE SERPL-CCNC: 49 U/L (ref 4–60)
LYMPHOCYTES # BLD AUTO: 2.9 K/UL (ref 1–4.8)
LYMPHOCYTES # BLD AUTO: 3.4 K/UL (ref 1–4.8)
LYMPHOCYTES NFR BLD: 35.1 % (ref 18–48)
LYMPHOCYTES NFR BLD: 36.5 % (ref 18–48)
MCH RBC QN AUTO: 23.3 PG (ref 27–31)
MCH RBC QN AUTO: 23.3 PG (ref 27–31)
MCHC RBC AUTO-ENTMCNC: 28.8 G/DL (ref 32–36)
MCHC RBC AUTO-ENTMCNC: 29 G/DL (ref 32–36)
MCV RBC AUTO: 80 FL (ref 82–98)
MCV RBC AUTO: 81 FL (ref 82–98)
MONOCYTES # BLD AUTO: 0.6 K/UL (ref 0.3–1)
MONOCYTES # BLD AUTO: 0.6 K/UL (ref 0.3–1)
MONOCYTES NFR BLD: 6.7 % (ref 4–15)
MONOCYTES NFR BLD: 6.8 % (ref 4–15)
NEUTROPHILS # BLD AUTO: 4.6 K/UL (ref 1.8–7.7)
NEUTROPHILS # BLD AUTO: 5 K/UL (ref 1.8–7.7)
NEUTROPHILS NFR BLD: 53.7 % (ref 38–73)
NEUTROPHILS NFR BLD: 55 % (ref 38–73)
NRBC BLD-RTO: 0 /100 WBC
NRBC BLD-RTO: 0 /100 WBC
PLATELET # BLD AUTO: 355 K/UL (ref 150–450)
PLATELET # BLD AUTO: 411 K/UL (ref 150–450)
PMV BLD AUTO: 10 FL (ref 9.2–12.9)
PMV BLD AUTO: 9.4 FL (ref 9.2–12.9)
POTASSIUM SERPL-SCNC: 3.6 MMOL/L (ref 3.5–5.1)
POTASSIUM SERPL-SCNC: 4.4 MMOL/L (ref 3.5–5.1)
PROT SERPL-MCNC: 7.5 G/DL (ref 6–8.4)
PROT SERPL-MCNC: 8.2 G/DL (ref 6–8.4)
RBC # BLD AUTO: 4.03 M/UL (ref 4.6–6.2)
RBC # BLD AUTO: 4.12 M/UL (ref 4.6–6.2)
SODIUM SERPL-SCNC: 142 MMOL/L (ref 136–145)
SODIUM SERPL-SCNC: 144 MMOL/L (ref 136–145)
WBC # BLD AUTO: 8.31 K/UL (ref 3.9–12.7)
WBC # BLD AUTO: 9.38 K/UL (ref 3.9–12.7)

## 2024-01-01 PROCEDURE — 25000003 PHARM REV CODE 250: Performed by: PHYSICIAN ASSISTANT

## 2024-01-01 PROCEDURE — 99284 EMERGENCY DEPT VISIT MOD MDM: CPT | Mod: 25

## 2024-01-01 PROCEDURE — 63600175 PHARM REV CODE 636 W HCPCS: Performed by: PHYSICIAN ASSISTANT

## 2024-01-01 PROCEDURE — 93005 ELECTROCARDIOGRAM TRACING: CPT | Performed by: INTERNAL MEDICINE

## 2024-01-01 PROCEDURE — 96375 TX/PRO/DX INJ NEW DRUG ADDON: CPT

## 2024-01-01 PROCEDURE — 80053 COMPREHEN METABOLIC PANEL: CPT | Performed by: EMERGENCY MEDICINE

## 2024-01-01 PROCEDURE — 63600175 PHARM REV CODE 636 W HCPCS: Performed by: STUDENT IN AN ORGANIZED HEALTH CARE EDUCATION/TRAINING PROGRAM

## 2024-01-01 PROCEDURE — 93010 ELECTROCARDIOGRAM REPORT: CPT | Mod: ,,, | Performed by: INTERNAL MEDICINE

## 2024-01-01 PROCEDURE — 93005 ELECTROCARDIOGRAM TRACING: CPT

## 2024-01-01 PROCEDURE — 83690 ASSAY OF LIPASE: CPT | Performed by: EMERGENCY MEDICINE

## 2024-01-01 PROCEDURE — 85025 COMPLETE CBC W/AUTO DIFF WBC: CPT | Performed by: EMERGENCY MEDICINE

## 2024-01-01 PROCEDURE — C9113 INJ PANTOPRAZOLE SODIUM, VIA: HCPCS | Performed by: PHYSICIAN ASSISTANT

## 2024-01-01 PROCEDURE — 80048 BASIC METABOLIC PNL TOTAL CA: CPT | Mod: XB

## 2024-01-01 PROCEDURE — 96361 HYDRATE IV INFUSION ADD-ON: CPT

## 2024-01-01 PROCEDURE — 96374 THER/PROPH/DIAG INJ IV PUSH: CPT

## 2024-01-01 PROCEDURE — 96376 TX/PRO/DX INJ SAME DRUG ADON: CPT

## 2024-01-01 RX ORDER — MORPHINE SULFATE 2 MG/ML
6 INJECTION, SOLUTION INTRAMUSCULAR; INTRAVENOUS
Status: COMPLETED | OUTPATIENT
Start: 2024-01-01 | End: 2024-01-01

## 2024-01-01 RX ORDER — KETOROLAC TROMETHAMINE 30 MG/ML
10 INJECTION, SOLUTION INTRAMUSCULAR; INTRAVENOUS
Status: COMPLETED | OUTPATIENT
Start: 2024-01-01 | End: 2024-01-01

## 2024-01-01 RX ORDER — PANTOPRAZOLE SODIUM 40 MG/10ML
40 INJECTION, POWDER, LYOPHILIZED, FOR SOLUTION INTRAVENOUS
Status: COMPLETED | OUTPATIENT
Start: 2024-01-01 | End: 2024-01-01

## 2024-01-01 RX ORDER — HYDROMORPHONE HYDROCHLORIDE 1 MG/ML
0.5 INJECTION, SOLUTION INTRAMUSCULAR; INTRAVENOUS; SUBCUTANEOUS
Status: COMPLETED | OUTPATIENT
Start: 2024-01-01 | End: 2024-01-01

## 2024-01-01 RX ORDER — HYDROMORPHONE HYDROCHLORIDE 1 MG/ML
1 INJECTION, SOLUTION INTRAMUSCULAR; INTRAVENOUS; SUBCUTANEOUS
Status: COMPLETED | OUTPATIENT
Start: 2024-01-01 | End: 2024-01-01

## 2024-01-01 RX ORDER — KETOROLAC TROMETHAMINE 30 MG/ML
30 INJECTION, SOLUTION INTRAMUSCULAR; INTRAVENOUS
Status: COMPLETED | OUTPATIENT
Start: 2024-01-01 | End: 2024-01-01

## 2024-01-01 RX ORDER — DROPERIDOL 2.5 MG/ML
1.25 INJECTION, SOLUTION INTRAMUSCULAR; INTRAVENOUS
Status: COMPLETED | OUTPATIENT
Start: 2024-01-01 | End: 2024-01-01

## 2024-01-01 RX ORDER — OXYCODONE HYDROCHLORIDE 5 MG/1
5 TABLET ORAL EVERY 6 HOURS PRN
COMMUNITY
Start: 2023-12-04

## 2024-01-01 RX ORDER — SENNOSIDES 8.6 MG/1
1 TABLET ORAL NIGHTLY
COMMUNITY
Start: 2023-11-14 | End: 2024-11-13

## 2024-01-01 RX ADMIN — HYDROMORPHONE HYDROCHLORIDE 0.5 MG: 1 INJECTION, SOLUTION INTRAMUSCULAR; INTRAVENOUS; SUBCUTANEOUS at 03:01

## 2024-01-01 RX ADMIN — MORPHINE SULFATE 6 MG: 2 INJECTION, SOLUTION INTRAMUSCULAR; INTRAVENOUS at 10:01

## 2024-01-01 RX ADMIN — KETOROLAC TROMETHAMINE 10 MG: 30 INJECTION, SOLUTION INTRAMUSCULAR; INTRAVENOUS at 10:01

## 2024-01-01 RX ADMIN — KETOROLAC TROMETHAMINE 30 MG: 30 INJECTION, SOLUTION INTRAMUSCULAR; INTRAVENOUS at 03:01

## 2024-01-01 RX ADMIN — SODIUM CHLORIDE 1000 ML: 9 INJECTION, SOLUTION INTRAVENOUS at 09:01

## 2024-01-01 RX ADMIN — HYDROMORPHONE HYDROCHLORIDE 1 MG: 1 INJECTION, SOLUTION INTRAMUSCULAR; INTRAVENOUS; SUBCUTANEOUS at 01:01

## 2024-01-01 RX ADMIN — HYDROMORPHONE HYDROCHLORIDE 1 MG: 1 INJECTION, SOLUTION INTRAMUSCULAR; INTRAVENOUS; SUBCUTANEOUS at 09:01

## 2024-01-01 RX ADMIN — DROPERIDOL 1.25 MG: 2.5 INJECTION, SOLUTION INTRAMUSCULAR; INTRAVENOUS at 09:01

## 2024-01-01 RX ADMIN — HYDROMORPHONE HYDROCHLORIDE 1 MG: 1 INJECTION, SOLUTION INTRAMUSCULAR; INTRAVENOUS; SUBCUTANEOUS at 11:01

## 2024-01-01 RX ADMIN — PANTOPRAZOLE SODIUM 40 MG: 40 INJECTION, POWDER, FOR SOLUTION INTRAVENOUS at 09:01

## 2024-01-01 NOTE — ED PROVIDER NOTES
"Encounter Date: 12/31/2023       History     Chief Complaint   Patient presents with    Emesis     "I'm having a pancreatitis flare up." Seen two days ago at [facility] for same thing. 9/10 LUQ abdominal pain that radiates to the neck. Reports emesis with red streaks since being discharged. Took promethazine and dilaudid with no relief.      23-year-old male with past medical history of necrotizing pancreatitis (s/p EUS, and EGD w/ necrostomy, had pancreatic stent removed at [facility] on 12/13), polycythemia vera, HIV presents to the ED with chief complaint of nausea and vomiting over the past day. Patient has attempted taking home dilaudid, robaxin, promethazine and lyrica without relief. He has not been able to tolerate any oral intake. Pain is currently 9/10. Patient states he has been urinating normally. Last BM was yesterday.Stool was very hard and nonbloody. He describes emesis as yellow vomit with small streaks of blood. He also reports mild dizziness. No fevers, chills, rash, chest pain or shortness of breath.      Review of patient's allergies indicates:   Allergen Reactions    [place] Swelling    Pecan nut Swelling    Penicillins Hives     Tolerates cephalosporins    Tolerated piperacillin/tazobactam 11/2023    Soy Other (See Comments)    Sulfa (sulfonamide antibiotics) Hives     Past Medical History:   Diagnosis Date    Acute necrotizing pancreatitis 09/20/2023    Alcohol use disorder 10/05/2023    Asthma     Hepatitis C antibody positive in blood 09/18/2023 9/2023 PCR negative    HIV infection 10/2021    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     Past Surgical History:   Procedure Laterality Date    ENDOSCOPIC ULTRASOUND OF UPPER GASTROINTESTINAL TRACT N/A 10/23/2023    Procedure: ULTRASOUND, UPPER GI TRACT, " ENDOSCOPIC;  Surgeon: Emil Villatoro MD;  Location: [facility] (2ND FLR);  Service: Endoscopy;  Laterality: N/A;    ERCP N/A 10/23/2023    Procedure: ERCP (ENDOSCOPIC RETROGRADE CHOLANGIOPANCREATOGRAPHY);  Surgeon: Emil Villatoro MD;  Location: [facility] ENDO (2ND FLR);  Service: Endoscopy;  Laterality: N/A;    ESOPHAGOGASTRODUODENOSCOPY N/A 9/18/2023    Procedure: EGD (ESOPHAGOGASTRODUODENOSCOPY);  Surgeon: Kg Cleaning MD;  Location: [facility] (2ND FLR);  Service: Endoscopy;  Laterality: N/A;     Family History   Problem Relation Age of Onset    Pancreatitis Mother     Cancer Mother     Ovarian cancer Mother     No Known Problems Father     Cancer Brother     Breast cancer Maternal Grandmother      Social History     Tobacco Use    Smoking status: Former     Types: Cigarettes    Smokeless tobacco: Never   Substance Use Topics    Alcohol use: Not Currently    Drug use: Never         Physical Exam     Initial Vitals [12/31/23 2207]   BP Pulse Resp Temp SpO2   116/75 110 16 98.2 °F (36.8 °C) 97 %      MAP       --         Physical Exam    Nursing note and vitals reviewed.  Constitutional: He is not diaphoretic. No distress.   HENT:   Head: Normocephalic and atraumatic.   Mouth/Throat: Oropharynx is clear and moist.   Eyes: Conjunctivae and EOM are normal.   Neck: Neck supple.   Normal range of motion.  Cardiovascular:  Normal rate, regular rhythm, normal heart sounds and intact distal pulses.           Pulmonary/Chest: Breath sounds normal. He has no wheezes. He has no rhonchi. He has no rales.   Abdominal: Abdomen is soft. He exhibits no distension. There is abdominal tenderness (epigastrium).   Musculoskeletal:         General: No tenderness or edema. Normal range of motion.      Cervical back: Normal range of motion and neck supple.     Neurological: He is alert and oriented to person, place, and time. He has normal strength. No sensory deficit.   Skin: Skin is warm and dry. Capillary refill takes less than 2  seconds.         ED Course   Procedures  Labs Reviewed   CBC W/ AUTO DIFFERENTIAL - Abnormal; Notable for the following components:       Result Value    RBC 4.03 (*)     Hemoglobin 9.4 (*)     Hematocrit 32.6 (*)     MCV 81 (*)     MCH 23.3 (*)     MCHC 28.8 (*)     RDW 18.1 (*)     All other components within normal limits   COMPREHENSIVE METABOLIC PANEL - Abnormal; Notable for the following components:    CO2 21 (*)     BUN 4 (*)     Albumin 3.4 (*)     ALT 7 (*)     All other components within normal limits   LIPASE   LACTIC ACID, PLASMA          Imaging Results    None          Medications   HYDROmorphone injection 2 mg (2 mg Intravenous Given 12/31/23 2326)   lactated ringers bolus 1,000 mL (0 mLs Intravenous Stopped 1/1/24 0028)   prochlorperazine injection Soln 5 mg (5 mg Intravenous Given 12/31/23 2352)   HYDROmorphone injection 1 mg (1 mg Intravenous Given 1/1/24 0122)   ketorolac injection 30 mg (30 mg Intravenous Given 1/1/24 0304)   HYDROmorphone injection 0.5 mg (0.5 mg Intravenous Given 1/1/24 0304)     Medical Decision Making  Patient is mildly tachycardic with  upon initial presentation but otherwise hemodynamically stable. He received IVF, dilaudid, toradol and compazine for symptom control. Lipase has downtrended since last hospital visit 3 days ago and is now within normal limits. Remainder of labs are at baseline. This episode appears consistent with chronic pain. On reassessment, patient is tolerating oral intake and able to ambulate independently. Patient states that he has an appt with his pain mgmt special in two weeks. He was also encouraged to follow-up with his PCP. He verbalized understanding and agreement with plan. Patient discharged home with return precautions. All questions answered.     Amount and/or Complexity of Data Reviewed  Labs: ordered. Decision-making details documented in ED Course.    Risk  Prescription drug management.                                      Clinical  Impression:  Final diagnoses:  [R11.2] Nausea and vomiting, unspecified vomiting type (Primary)  [R10.13] Epigastric pain          ED Disposition Condition    Discharge Stable          ED Prescriptions    None       Follow-up Information       Follow up With Specialties Details Why Contact Info    Pina Jones NP Family Medicine   [address] [facility] [address]  [phone]      [facility] - Emergency Dept Emergency Medicine  As needed, If symptoms worsen [address] [facility] [id number] [phone]             Ingrid Sinclair MD  Resident  01/05/24 1049

## 2024-01-01 NOTE — DISCHARGE INSTRUCTIONS
Home Care Instructions:  - Medications: Continue taking your home medications as prescribed    Follow-Up Plan:  - Follow-up with: Pain management  - Additional testing and/or evaluation will be directed by your primary doctor    Return to the Emergency Department for symptoms including but not limited to: worsening symptoms, severe back pain, shortness of breath or chest pain, vomiting with inability to hold down fluids, blood in vomit or poop, fevers greater than 100.4°F, passing out/fainting/unconsciousness, or other concerning symptoms.

## 2024-01-01 NOTE — ED NOTES
Patient states chronic mid upper abd pain , h/o  pancreatitis, reports nausea, vomiting, Dilaudid this am, vomiting

## 2024-01-01 NOTE — ED NOTES
I-STAT Chem-8+ Results:   Value Reference Range   Sodium 141 136-145 mmol/L   Potassium  3.4 3.5-5.1 mmol/L   Chloride 106  mmol/L   Ionized Calcium 1.13 1.06-1.42 mmol/L   CO2 (measured) 21 23-29 mmol/L   Glucose 105  mg/dL   BUN <3 6-30 mg/dL   Creatinine 0.6 0.5-1.4 mg/dL   Hematocrit 33 36-54%

## 2024-01-01 NOTE — ED NOTES
Pt given water, crackers, and pudding for PO challenge. After eating the crackers and pudding, pt denies any nausea or vomiting.

## 2024-01-01 NOTE — ED NOTES
Patient identifiers verified and correct for  Mr Cardona  C/C:  ABd pain SEE NN  APPEARANCE: awake and alert in NAD. PAIN  10/10  SKIN: warm, dry and intact. No breakdown or bruising.  MUSCULOSKELETAL: Patient moving all extremities spontaneously, no obvious swelling or deformities noted. Ambulates independently.  RESPIRATORY: Denies shortness of breath.Respirations unlabored.   CARDIAC: Denies CP, 2+ distal pulses; no peripheral edema  ABDOMEN: ABdomen soft, reports pain felecia mid abdomen, positive nausea, vomiting, no BM x 2 days  : voids spontaneously, denies difficulty  Neurologic: AAO x 4; follows commands equal strength in all extremities; denies numbness/tingling. Denies dizziness  Denies new weakness

## 2024-01-02 ENCOUNTER — HOSPITAL ENCOUNTER (EMERGENCY)
Facility: OTHER | Age: 24
Discharge: HOME | End: 2024-01-02
Attending: EMERGENCY MEDICINE
Payer: MEDICAID

## 2024-01-02 VITALS
WEIGHT: 140 LBS | DIASTOLIC BLOOD PRESSURE: 72 MMHG | OXYGEN SATURATION: 99 % | HEART RATE: 64 BPM | RESPIRATION RATE: 16 BRPM | TEMPERATURE: 98 F | BODY MASS INDEX: 20.04 KG/M2 | SYSTOLIC BLOOD PRESSURE: 113 MMHG | HEIGHT: 70 IN

## 2024-01-02 VITALS
RESPIRATION RATE: 16 BRPM | SYSTOLIC BLOOD PRESSURE: 124 MMHG | DIASTOLIC BLOOD PRESSURE: 77 MMHG | HEIGHT: 70 IN | HEART RATE: 100 BPM | BODY MASS INDEX: 20.04 KG/M2 | OXYGEN SATURATION: 99 % | TEMPERATURE: 98 F | WEIGHT: 140 LBS

## 2024-01-02 DIAGNOSIS — R10.13 EPIGASTRIC ABDOMINAL PAIN: Primary | ICD-10-CM

## 2024-01-02 DIAGNOSIS — R11.2 NAUSEA AND VOMITING, UNSPECIFIED VOMITING TYPE: ICD-10-CM

## 2024-01-02 LAB
ALBUMIN SERPL BCP-MCNC: 3.4 G/DL (ref 3.5–5.2)
ALP SERPL-CCNC: 79 U/L (ref 55–135)
ALT SERPL W/O P-5'-P-CCNC: 8 U/L (ref 10–44)
ANION GAP SERPL CALC-SCNC: 10 MMOL/L (ref 8–16)
AST SERPL-CCNC: 13 U/L (ref 10–40)
BASOPHILS # BLD AUTO: 0.03 K/UL (ref 0–0.2)
BASOPHILS NFR BLD: 0.5 % (ref 0–1.9)
BILIRUB SERPL-MCNC: 0.4 MG/DL (ref 0.1–1)
BUN SERPL-MCNC: 6 MG/DL (ref 6–20)
BUN SERPL-MCNC: <3 MG/DL (ref 6–30)
CALCIUM SERPL-MCNC: 9 MG/DL (ref 8.7–10.5)
CHLORIDE SERPL-SCNC: 106 MMOL/L (ref 95–110)
CHLORIDE SERPL-SCNC: 107 MMOL/L (ref 95–110)
CO2 SERPL-SCNC: 21 MMOL/L (ref 23–29)
CREAT SERPL-MCNC: 0.6 MG/DL (ref 0.5–1.4)
CREAT SERPL-MCNC: 0.7 MG/DL (ref 0.5–1.4)
DIFFERENTIAL METHOD BLD: ABNORMAL
EOSINOPHIL # BLD AUTO: 0.1 K/UL (ref 0–0.5)
EOSINOPHIL NFR BLD: 1.5 % (ref 0–8)
ERYTHROCYTE [DISTWIDTH] IN BLOOD BY AUTOMATED COUNT: 18.2 % (ref 11.5–14.5)
EST. GFR  (NO RACE VARIABLE): >60 ML/MIN/1.73 M^2
GLUCOSE SERPL-MCNC: 105 MG/DL (ref 70–110)
GLUCOSE SERPL-MCNC: 110 MG/DL (ref 70–110)
HCT VFR BLD AUTO: 31.1 % (ref 40–54)
HCT VFR BLD CALC: 33 %PCV (ref 36–54)
HGB BLD-MCNC: 8.9 G/DL (ref 14–18)
IMM GRANULOCYTES # BLD AUTO: 0.01 K/UL (ref 0–0.04)
IMM GRANULOCYTES NFR BLD AUTO: 0.2 % (ref 0–0.5)
LIPASE SERPL-CCNC: 36 U/L (ref 4–60)
LYMPHOCYTES # BLD AUTO: 1.9 K/UL (ref 1–4.8)
LYMPHOCYTES NFR BLD: 28.7 % (ref 18–48)
MCH RBC QN AUTO: 23.3 PG (ref 27–31)
MCHC RBC AUTO-ENTMCNC: 28.6 G/DL (ref 32–36)
MCV RBC AUTO: 81 FL (ref 82–98)
MONOCYTES # BLD AUTO: 0.5 K/UL (ref 0.3–1)
MONOCYTES NFR BLD: 7.3 % (ref 4–15)
NEUTROPHILS # BLD AUTO: 4.1 K/UL (ref 1.8–7.7)
NEUTROPHILS NFR BLD: 61.8 % (ref 38–73)
NRBC BLD-RTO: 0 /100 WBC
PLATELET # BLD AUTO: 327 K/UL (ref 150–450)
PMV BLD AUTO: 10 FL (ref 9.2–12.9)
POC IONIZED CALCIUM: 1.13 MMOL/L (ref 1.06–1.42)
POC TCO2 (MEASURED): 21 MMOL/L (ref 23–29)
POTASSIUM BLD-SCNC: 3.4 MMOL/L (ref 3.5–5.1)
POTASSIUM SERPL-SCNC: 4 MMOL/L (ref 3.5–5.1)
PROT SERPL-MCNC: 7.5 G/DL (ref 6–8.4)
RBC # BLD AUTO: 3.82 M/UL (ref 4.6–6.2)
SAMPLE: ABNORMAL
SODIUM BLD-SCNC: 141 MMOL/L (ref 136–145)
SODIUM SERPL-SCNC: 138 MMOL/L (ref 136–145)
WBC # BLD AUTO: 6.55 K/UL (ref 3.9–12.7)

## 2024-01-02 PROCEDURE — 63600175 PHARM REV CODE 636 W HCPCS

## 2024-01-02 PROCEDURE — 96376 TX/PRO/DX INJ SAME DRUG ADON: CPT

## 2024-01-02 PROCEDURE — 25000003 PHARM REV CODE 250

## 2024-01-02 PROCEDURE — 85025 COMPLETE CBC W/AUTO DIFF WBC: CPT | Performed by: NURSE PRACTITIONER

## 2024-01-02 PROCEDURE — 96361 HYDRATE IV INFUSION ADD-ON: CPT

## 2024-01-02 PROCEDURE — 99284 EMERGENCY DEPT VISIT MOD MDM: CPT | Mod: 25

## 2024-01-02 PROCEDURE — 96374 THER/PROPH/DIAG INJ IV PUSH: CPT

## 2024-01-02 PROCEDURE — 80053 COMPREHEN METABOLIC PANEL: CPT | Performed by: NURSE PRACTITIONER

## 2024-01-02 PROCEDURE — 96375 TX/PRO/DX INJ NEW DRUG ADDON: CPT

## 2024-01-02 PROCEDURE — 36415 COLL VENOUS BLD VENIPUNCTURE: CPT | Performed by: NURSE PRACTITIONER

## 2024-01-02 PROCEDURE — 83690 ASSAY OF LIPASE: CPT | Performed by: NURSE PRACTITIONER

## 2024-01-02 RX ORDER — HYDROMORPHONE HYDROCHLORIDE 1 MG/ML
0.5 INJECTION, SOLUTION INTRAMUSCULAR; INTRAVENOUS; SUBCUTANEOUS
Status: COMPLETED | OUTPATIENT
Start: 2024-01-02 | End: 2024-01-02

## 2024-01-02 RX ORDER — PROMETHAZINE HYDROCHLORIDE 25 MG/1
25 TABLET ORAL EVERY 6 HOURS PRN
Qty: 20 TABLET | Refills: 0 | Status: SHIPPED | OUTPATIENT
Start: 2024-01-02 | End: 2024-01-09

## 2024-01-02 RX ORDER — DICYCLOMINE HYDROCHLORIDE 20 MG/1
20 TABLET ORAL 2 TIMES DAILY PRN
Qty: 20 TABLET | Refills: 0 | Status: SHIPPED | OUTPATIENT
Start: 2024-01-02 | End: 2024-02-01

## 2024-01-02 RX ORDER — KETOROLAC TROMETHAMINE 30 MG/ML
30 INJECTION, SOLUTION INTRAMUSCULAR; INTRAVENOUS
Status: COMPLETED | OUTPATIENT
Start: 2024-01-02 | End: 2024-01-02

## 2024-01-02 RX ORDER — KETOROLAC TROMETHAMINE 30 MG/ML
30 INJECTION, SOLUTION INTRAMUSCULAR; INTRAVENOUS
Status: DISCONTINUED | OUTPATIENT
Start: 2024-01-02 | End: 2024-01-02

## 2024-01-02 RX ORDER — ONDANSETRON 2 MG/ML
4 INJECTION INTRAMUSCULAR; INTRAVENOUS
Status: COMPLETED | OUTPATIENT
Start: 2024-01-02 | End: 2024-01-02

## 2024-01-02 RX ADMIN — SODIUM CHLORIDE 1000 ML: 9 INJECTION, SOLUTION INTRAVENOUS at 04:01

## 2024-01-02 RX ADMIN — ONDANSETRON 4 MG: 2 INJECTION INTRAMUSCULAR; INTRAVENOUS at 04:01

## 2024-01-02 RX ADMIN — KETOROLAC TROMETHAMINE 30 MG: 30 INJECTION, SOLUTION INTRAMUSCULAR at 05:01

## 2024-01-02 RX ADMIN — HYDROMORPHONE HYDROCHLORIDE 0.5 MG: 0.5 INJECTION, SOLUTION INTRAMUSCULAR; INTRAVENOUS; SUBCUTANEOUS at 05:01

## 2024-01-02 RX ADMIN — HYDROMORPHONE HYDROCHLORIDE 0.5 MG: 0.5 INJECTION, SOLUTION INTRAMUSCULAR; INTRAVENOUS; SUBCUTANEOUS at 04:01

## 2024-01-02 NOTE — ED NOTES
Unable to establish IV due to poor venous access. Patient given cup for urine sample, verbally acknowledged the need for one.

## 2024-01-02 NOTE — FIRST PROVIDER EVALUATION
Emergency Department TeleTriage Encounter Note      CHIEF COMPLAINT    Chief Complaint   Patient presents with    Abdominal Pain     Abdominal pain hematemesis x 2 days.  Thinks he's having a pancreatitis flare up.        VITAL SIGNS   Initial Vitals [01/02/24 1527]   BP Pulse Resp Temp SpO2   108/70 (!) 117 16 97.9 °F (36.6 °C) 100 %      MAP       --            ALLERGIES    Review of patient's allergies indicates:   Allergen Reactions    [place] Swelling    Pecan nut Swelling    Penicillins Hives     Tolerates cephalosporins    Tolerated piperacillin/tazobactam 11/2023    Soy Other (See Comments)    Sulfa (sulfonamide antibiotics) Hives       PROVIDER TRIAGE NOTE  Verbal consent for the teletriage evaluation was given by the patient at the start of the evaluation.  All efforts will be made to maintain patient's privacy during the evaluation.      This is a teletriage evaluation of a 23 y.o. male presenting to the ED with c/o LUQ abdominal pain, nausea and vomiting for a week.  Fifth visit since 12/29/2023 to the ED for same complaint.  Thinks he is having a pancreatitis flare.  Phenergan not working. Limited physical exam via telehealth: The patient is awake, alert, answering questions appropriately and is not in respiratory distress.  As the Teletriage provider, I performed an initial assessment and ordered appropriate labs and imaging studies, if any, to facilitate the patient's care once placed in the ED. Once a room is available, care and a full evaluation will be completed by an alternate ED provider.  Any additional orders and the final disposition will be determined by that provider.  All imaging and labs will not be followed-up by the Teletriage Team, including myself.          ORDERS  Labs Reviewed   CBC W/ AUTO DIFFERENTIAL   COMPREHENSIVE METABOLIC PANEL   LIPASE   URINALYSIS, REFLEX TO URINE CULTURE       ED Orders (720h ago, onward)      Start Ordered     Status Ordering Provider    01/02/24 5517  01/02/24 1531  Vital signs  Every 2 hours         Ordered MORRIS NGUYEN    01/02/24 1532 01/02/24 1531  Diet NPO  Diet effective now         Ordered MORRIS NGUYEN    01/02/24 1532 01/02/24 1531  Insert peripheral IV  Once         Ordered MORRIS NGUYEN    01/02/24 1532 01/02/24 1531  CBC W/ AUTO DIFFERENTIAL  STAT         Ordered MORRIS NGUYEN    01/02/24 1532 01/02/24 1531  Comp. Metabolic Panel  STAT         Ordered MORRIS NGUYEN    01/02/24 1532 01/02/24 1531  Lipase  STAT         Ordered MORRIS NGUYEN    01/02/24 1532 01/02/24 1531  Urinalysis, Reflex to Urine Culture Urine, Clean Catch  STAT         Ordered MORRIS NGUYEN              Virtual Visit Note: The provider triage portion of this emergency department evaluation and documentation was performed via [employer], a HIPAA-compliant telemedicine application, in concert with a tele-presenter in the room. A face to face patient evaluation with one of my colleagues will occur once the patient is placed in an emergency department room.      DISCLAIMER: This note was prepared with [employer] voice recognition transcription software. Garbled syntax, mangled pronouns, and other bizarre constructions may be attributed to that software system.

## 2024-01-02 NOTE — ED NOTES
Patient identifiers verified and correct for Mr Cardona  C/C: ABD pain SEE NN  APPEARANCE: awake and alert in NAD. PAIN  10/10  SKIN: warm, dry and intact. No breakdown or bruising.  MUSCULOSKELETAL: Patient moving all extremities spontaneously, no obvious swelling or deformities noted. Ambulates independently.  RESPIRATORY: Denies shortness of breath.Respirations unlabored.   CARDIAC: Denies CP, 2+ distal pulses; no peripheral edema  ABDOMEN: ABdomen soft, reports pain to al over abdomen, positive nausea, vomiting   : voids spontaneously, denies difficulty  Neurologic: AAO x 4; follows commands equal strength in all extremities; denies numbness/tingling. Denies dizziness Denies new weakness

## 2024-01-02 NOTE — ED NOTES
Patient states chronic mid upper abd pain, worse today, seen in ED for same mult times recently, unable to tolerate home Dilaudid due to vomiting

## 2024-01-02 NOTE — DISCHARGE INSTRUCTIONS
Diagnosis: Abdominal pain, chronic pancreatitis     I think your pain is associated with your chronic pancreatitis.  I am prescribing medication for nausea and pain that you can take as needed.  Your lab tests did not show any concerning findings.      I sent a referral to our Gastroenterology doctors for follow-up.  Please call to schedule follow-up appointment.  If you  down fluids, start to become jaundiced or have worsening symptoms please return to the emergency department.

## 2024-01-02 NOTE — ED PROVIDER NOTES
Encounter Date: 1/2/2024       History     Chief Complaint   Patient presents with    Abdominal Pain     Abdominal pain hematemesis x 2 days.  Thinks he's having a pancreatitis flare up.      This is a 23 y.o male with necrotizing pancreatitis( S/p EUS, and EGD w/ necrostomy, had pancreatic stent removed at [facility] on 12/13), polycythemia vera, HIV (last CD4 1080) who is presenting to the emergency department with epigastric pain. He reports associated nausea with vomiting. States he has been unable to tolerate any food, fluids, or medications.  Patient reports blood-tinged emesis secondary to frequent vomiting.  He has been seen in this ED and outside ED multiple times this week with the same symptoms and discharged after reassuring workup. He denies alcohol use.    The history is provided by the patient.     Review of patient's allergies indicates:   Allergen Reactions    [place] Swelling    Pecan nut Swelling    Penicillins Hives     Tolerates cephalosporins    Tolerated piperacillin/tazobactam 11/2023    Soy Other (See Comments)    Sulfa (sulfonamide antibiotics) Hives     Past Medical History:   Diagnosis Date    Acute necrotizing pancreatitis 09/20/2023    Alcohol use disorder 10/05/2023    Asthma     Hepatitis C antibody positive in blood 09/18/2023 9/2023 PCR negative    HIV infection 10/2021    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     Past Surgical History:   Procedure Laterality Date    ENDOSCOPIC ULTRASOUND OF UPPER GASTROINTESTINAL TRACT N/A 10/23/2023    Procedure: ULTRASOUND, UPPER GI TRACT, ENDOSCOPIC;  Surgeon: Emil Villatoro MD;  Location: [address]);  Service: Endoscopy;  Laterality: N/A;    ERCP N/A 10/23/2023    Procedure: ERCP (ENDOSCOPIC RETROGRADE CHOLANGIOPANCREATOGRAPHY);   Surgeon: Emil Villatoro MD;  Location: [facility] ([address] [place] [address]);  Service: Endoscopy;  Laterality: N/A;    ESOPHAGOGASTRODUODENOSCOPY N/A 9/18/2023    Procedure: EGD (ESOPHAGOGASTRODUODENOSCOPY);  Surgeon: Kg Cleaning MD;  [facility]  Service: Endoscopy;  Laterality: N/A;     Family History   Problem Relation Age of Onset    Pancreatitis Mother     Cancer Mother     Ovarian cancer Mother     No Known Problems Father     Cancer Brother     Breast cancer Maternal Grandmother      Social History     Tobacco Use    Smoking status: Former     Types: Cigarettes    Smokeless tobacco: Never   Substance Use Topics    Alcohol use: Not Currently    Drug use: Never     Review of Systems  As per HPI above  Physical Exam     Initial Vitals [01/02/24 1527]   BP Pulse Resp Temp SpO2   108/70 (!) 117 16 97.9 °F (36.6 °C) 100 %      MAP       --         Physical Exam    Constitutional: Vital signs are normal. He appears well-developed and well-nourished. He is cooperative.  Non-toxic appearance. He does not appear ill. No distress.   HENT:   Head: Normocephalic and atraumatic.   Eyes: Conjunctivae and lids are normal.   Neck: Trachea normal. Neck supple. No stridor present.   Cardiovascular:  Normal rate and regular rhythm.           Pulmonary/Chest: Effort normal. No tachypnea. No respiratory distress.   Abdominal: Abdomen is soft and flat. He exhibits no distension. There is abdominal tenderness in the epigastric area and left upper quadrant. There is no rebound, no guarding, no tenderness at McBurney's point and negative Bland's sign. negative Rovsing's sign  Musculoskeletal:      Cervical back: Neck supple.     Neurological: He is alert and oriented to person, place, and time. GCS eye subscore is 4. GCS verbal subscore is 5. GCS motor subscore is 6.   Skin: Skin is warm, dry and intact. No rash noted.   Psychiatric: He has a normal mood and affect. His speech is normal and behavior is normal. Thought content  normal.         ED Course   Procedures  Labs Reviewed   CBC W/ AUTO DIFFERENTIAL - Abnormal; Notable for the following components:       Result Value    RBC 3.82 (*)     Hemoglobin 8.9 (*)     Hematocrit 31.1 (*)     MCV 81 (*)     MCH 23.3 (*)     MCHC 28.6 (*)     RDW 18.2 (*)     All other components within normal limits   COMPREHENSIVE METABOLIC PANEL - Abnormal; Notable for the following components:    CO2 21 (*)     Albumin 3.4 (*)     ALT 8 (*)     All other components within normal limits   LIPASE   URINALYSIS, REFLEX TO URINE CULTURE          Imaging Results    None          Medications   sodium chloride 0.9% bolus 1,000 mL 1,000 mL (0 mLs Intravenous Stopped 1/2/24 1721)   ondansetron injection 4 mg (4 mg Intravenous Given 1/2/24 1607)   HYDROmorphone injection 0.5 mg (0.5 mg Intravenous Given 1/2/24 1609)   HYDROmorphone injection 0.5 mg (0.5 mg Intravenous Given 1/2/24 1746)   ketorolac injection 30 mg (30 mg Intravenous Given 1/2/24 1747)     Medical Decision Making  Urgent evaluation of an afebrile 23-year-old male with epigastric abdominal pain and vomiting, consistent with his usual flares of chronic pancreatitis. He is tachycardic, but otherwise normotensive and hemodynamically stable.  Will begin fluid rehydration, treat pain, obtain labs, and reassess.  Of note, patient with daily ED visits this week. /P performed on 12/29 shows sequelae of pancreatitis with improvement of peripancreatic collections.  Do not feel that additional imaging is necessary at this time.    Chart review shows:  12/29: presented and planned for admission but had to leave to take care of his dog  12/30: lipase slightly elevated, but not acute pancreatitis, treated with dilaudid and fluids and d/c'ed  12/31: presented again as unable to hold down pain meds without vomiting. Treated with IV dilaudid and compazine and d/c'ed  1/1: patient presented again with same symptoms. Hospitalist suspects patient is displaying a level of  malingering given reassuring workup but continuing to present daily    Differential diagnosis includes but is not limited to:  Chronic pancreatitis, acute pancreatitis, viral gastroenteritis, colitis      Amount and/or Complexity of Data Reviewed  External Data Reviewed: labs, radiology and notes.     Details: CT a/p from 12/29 shows: Sequela of pancreatitis with continued improvement in size of peripancreatic collections. These collections contain very little residual fluid. No evidence of a new peripancreatic collection or worsening pancreatitis.  Evidence concerning for portal hypertension including splenomegaly, dilation of the main portal vein, and collateral vessel formation through the upper abdomen.  Question wall thickening and inflammation about the transverse colon near the hepatic flexure, possibly suggesting nonspecific colitis, but persistent generalized intra-abdominal inflammatory changes related to recent pancreatitis are felt more likely.  Labs:  Decision-making details documented in ED Course.    Risk  Prescription drug management.               ED Course as of 01/02/24 1806   Tue Jan 02, 2024   1648 CBC shows stable anemia without leukocytosis [WAYNE]   1709 Lipase: 36 [WAYNE]   1709 Comp. Metabolic Panel(!)  No significant electrolyte abnormalities or metabolic derangement [WAYNE]   1733 Patient remains afebrile and nontoxic-appearing. Had minimal relief after 0.5 Dilaudid, additional 0.5 of Dilaudid and Toradol administered. He did have relief of nausea with administration of Zofran. No evidence of acute on chronic pancreatitis, lipase normal. No other metabolic derangements or electrolyte abnormalities. CBC shows stable anemia, no concern for his polycythemia vera this time. Suspect possible postop pain that is poorly controlled rather than continued issues with his pancreas. Staff message sent to ED navigator to help facilitate outpatient management for his chronic illness as he continues to utilize  the ED. Patient also has appointment scheduled with pain management Patient educated on signs and symptoms to monitor for and when to return to ED. Patient verbalized understanding agrees with treatment plan. All questions and concerns addressed.    [WAYNE]      ED Course User Index  [WAYNE] Sally Guo PA-C                     Clinical Impression:  Final diagnoses:  [R11.2] Nausea and vomiting, unspecified vomiting type  [R10.13] Epigastric abdominal pain (Primary)          ED Disposition Condition    Discharge Stable          ED Prescriptions    None       Follow-up Information       Follow up With Specialties Details Why Contact Info    Pina Jones NP Family Medicine   [address] [facility] [address]  [phone]      [facility] - Emergency Dept Emergency Medicine  If symptoms worsen [address] [facility] [address] [phone]             Sally Guo PA-C  01/02/24 9524

## 2024-01-02 NOTE — ED PROVIDER NOTES
Encounter Date: 1/1/2024       History     Chief Complaint   Patient presents with    Abdominal Pain     Hx pancreatitis, seen yest and told to come to er if got worse     23 y.o. male with necrotizing pancreatitis( S/p EUS, and EGD w/ necrostomy, had pancreatic stent removed at [facility] on 12/13), polycythemia vera, HIV  (last CD4 1080) who is presenting to the emergency department with epigastric pain.   He was recently admitted 12/25/2023 for exacerbation of his pancreatitis, however he left against medical advice as he had to make arrangements for his dog.  He has been seen in the ED the last 3 days for the same complaint, has been taking Dilaudid, Toradol, Robaxin at home without relief.   When he was in the ED yesterday he was advised to return for any worsening symptoms and pain got somewhat worse which prompted him to come to the ED for re-evaluation.  He reports occasional blood streaks in his vomit, denies fever, chest pain, urinary symptoms changes in bowel movements.      Review of patient's allergies indicates:   Allergen Reactions    [place] Swelling    Pecan nut Swelling    Penicillins Hives     Tolerates cephalosporins    Tolerated piperacillin/tazobactam 11/2023    Soy Other (See Comments)    Sulfa (sulfonamide antibiotics) Hives     Past Medical History:   Diagnosis Date    Acute necrotizing pancreatitis 09/20/2023    Alcohol use disorder 10/05/2023    Asthma     Hepatitis C antibody positive in blood 09/18/2023 9/2023 PCR negative    HIV infection 10/2021    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     Past Surgical History:   Procedure Laterality Date    ENDOSCOPIC ULTRASOUND OF UPPER GASTROINTESTINAL TRACT N/A 10/23/2023    Procedure: ULTRASOUND, UPPER GI TRACT, ENDOSCOPIC;  Surgeon: Emil Villatoro  MD LEONIE;  [facility]  Service: Endoscopy;  Laterality: N/A;    ERCP N/A 10/23/2023    Procedure: ERCP (ENDOSCOPIC RETROGRADE CHOLANGIOPANCREATOGRAPHY);  Surgeon: Emil Villatoro MD;  [facility]  Service: Endoscopy;  Laterality: N/A;    ESOPHAGOGASTRODUODENOSCOPY N/A 9/18/2023    Procedure: EGD (ESOPHAGOGASTRODUODENOSCOPY);  Surgeon: Kg Cleaning MD;  [facility]  Service: Endoscopy;  Laterality: N/A;     Family History   Problem Relation Age of Onset    Pancreatitis Mother     Cancer Mother     Ovarian cancer Mother     No Known Problems Father     Cancer Brother     Breast cancer Maternal Grandmother      Social History     Tobacco Use    Smoking status: Former     Types: Cigarettes    Smokeless tobacco: Never   Substance Use Topics    Alcohol use: Not Currently    Drug use: Never     Review of Systems    Physical Exam     Initial Vitals [01/01/24 1655]   BP Pulse Resp Temp SpO2   110/64 91 16 98.7 °F (37.1 °C) 99 %      MAP       --         Physical Exam    Nursing note and vitals reviewed.  Constitutional: He appears well-developed and well-nourished. He is not diaphoretic. No distress.   HENT:   Head: Normocephalic and atraumatic.   Eyes: No scleral icterus.   Cardiovascular:  Normal rate, regular rhythm, normal heart sounds and intact distal pulses.     Exam reveals no gallop and no friction rub.       No murmur heard.  Pulmonary/Chest: Breath sounds normal. No respiratory distress. He has no wheezes. He has no rhonchi. He has no rales. He exhibits no tenderness.   Abdominal: Abdomen is soft. Bowel sounds are normal. He exhibits no distension and no mass. There is abdominal tenderness. There is no rebound and no guarding.   Musculoskeletal:         General: Normal range of motion.     Neurological: He is alert.   Skin: Skin is warm and dry.   Psychiatric: He has a normal mood and affect.         ED Course   Procedures  Labs Reviewed   CBC W/ AUTO  DIFFERENTIAL - Abnormal; Notable for the following components:       Result Value    RBC 4.12 (*)     Hemoglobin 9.6 (*)     Hematocrit 33.1 (*)     MCV 80 (*)     MCH 23.3 (*)     MCHC 29.0 (*)     RDW 18.3 (*)     All other components within normal limits   COMPREHENSIVE METABOLIC PANEL - Abnormal; Notable for the following components:    ALT 8 (*)     All other components within normal limits   LIPASE   ISTAT CHEM8          Imaging Results    None          Medications   HYDROmorphone injection 1 mg (1 mg Intravenous Given 1/1/24 2134)   droPERidol injection 1.25 mg (1.25 mg Intravenous Given 1/1/24 2133)   sodium chloride 0.9% bolus 1,000 mL 1,000 mL (0 mLs Intravenous Stopped 1/1/24 2235)   pantoprazole injection 40 mg (40 mg Intravenous Given 1/1/24 2133)   ketorolac injection 9.999 mg (9.999 mg Intravenous Given 1/1/24 2228)   morphine injection 6 mg (6 mg Intravenous Given 1/1/24 2228)   HYDROmorphone injection 1 mg (1 mg Intravenous Given 1/1/24 2350)     Medical Decision Making    23-year-old male presenting for epigastric pain and vomiting  consistent with flares of his chronic pancreatitis.  His vitals are normal and he is well-appearing.     Differential diagnosis:   Exacerbation of chronic pancreatitis   Electrolyte derangement  Liver dysfunction   Dehydration     Will check labs, give antiemetics, analgesics and reassess.     Lab workup is reassuring.  Consider hospitalization for this patient given his persistent symptoms, however given reassuring lab workup, he is tolerating p.o. intake will discharge with referral to GI for follow-up.  He has a scheduled appointment coming up with pain management as well as GI. Stressed the importance of follow-up, strict ED return precautions given.  Patient voiced understanding and is comfortable with discharge. I discussed this patient with my supervising physician.      Amount and/or Complexity of Data Reviewed  Labs: ordered. Decision-making details documented  in ED Course.    Risk  Prescription drug management.  Parenteral controlled substances.  Decision regarding hospitalization.               ED Course as of 01/02/24 0207   Mon Jan 01, 2024 2033 Hemoglobin(!): 9.6   Stable anemia [CC]   2033 WBC: 9.38   No leukocytosis [CC]   2056 Lipase: 49 [CC]   2312  Patient is still reporting severe pain after therapy.  Given significant pain medication requirements,  will admit to Hospital Medicine for further management [CC]   2323 I discussed admission with hospitalist Dr. Malik  who states that the patient does not need to be admitted because he does not have any electrolyte derangements and is likely malingering.  I do think that this is certainly a possibility given numerous ED visits. Will try giving 1 additional dose of analgesics and reassess [CC]   Tue Jan 02, 2024 0037  Tolerating p.o. intake. [CC]      ED Course User Index  [CC] Sarah Philippe PA-C                           Clinical Impression:  Final diagnoses:  [R10.9] Abdominal pain  [K86.1] Chronic pancreatitis, unspecified pancreatitis type (Primary)          ED Disposition Condition    Discharge Stable          ED Prescriptions       Medication Sig Dispense Start Date End Date Auth. Provider    promethazine (PHENERGAN) 25 MG tablet Take 1 tablet (25 mg total) by mouth every 6 (six) hours as needed for Nausea. 20 tablet 1/2/2024 1/9/2024 Sarah Philippe PA-C    dicyclomine (BENTYL) 20 mg tablet Take 1 tablet (20 mg total) by mouth 2 (two) times daily as needed (Abdominal pain). 20 tablet 1/2/2024 2/1/2024 Sarah Philippe PA-C          Follow-up Information       Follow up With Specialties Details Why Contact Info Additional Information    [facility] G[facility] Schedule an appointment as soon as possible for a visit in 1 week  [address]  [facility] [id number] [phone] [facility] - Main Building, 4th Floor [facility]  Garage and take Atrium elevator    [facility] Emergency [facility] Emergency Medicine Go to  If symptoms worsen [address]  [facility] [id number] [phone]              Sarah Philippe PA-C  01/02/24 0200

## 2024-01-04 ENCOUNTER — HOSPITAL ENCOUNTER (EMERGENCY)
Facility: HOSPITAL | Age: 24
Discharge: HOME | End: 2024-01-05
Attending: EMERGENCY MEDICINE
Payer: MEDICAID

## 2024-01-04 DIAGNOSIS — G89.4 CHRONIC PAIN SYNDROME: ICD-10-CM

## 2024-01-04 DIAGNOSIS — R10.9 ABDOMINAL PAIN, UNSPECIFIED ABDOMINAL LOCATION: Primary | ICD-10-CM

## 2024-01-04 LAB
ALBUMIN SERPL BCP-MCNC: 4 G/DL (ref 3.5–5.2)
ALP SERPL-CCNC: 109 U/L (ref 55–135)
ALT SERPL W/O P-5'-P-CCNC: 6 U/L (ref 10–44)
ANION GAP SERPL CALC-SCNC: 10 MMOL/L (ref 8–16)
AST SERPL-CCNC: 11 U/L (ref 10–40)
BASOPHILS # BLD AUTO: 0.04 K/UL (ref 0–0.2)
BASOPHILS NFR BLD: 0.4 % (ref 0–1.9)
BILIRUB SERPL-MCNC: 0.4 MG/DL (ref 0.1–1)
BUN SERPL-MCNC: 8 MG/DL (ref 6–20)
CALCIUM SERPL-MCNC: 9.2 MG/DL (ref 8.7–10.5)
CHLORIDE SERPL-SCNC: 103 MMOL/L (ref 95–110)
CO2 SERPL-SCNC: 26 MMOL/L (ref 23–29)
CREAT SERPL-MCNC: 0.7 MG/DL (ref 0.5–1.4)
DIFFERENTIAL METHOD BLD: ABNORMAL
EOSINOPHIL # BLD AUTO: 0.3 K/UL (ref 0–0.5)
EOSINOPHIL NFR BLD: 2.7 % (ref 0–8)
ERYTHROCYTE [DISTWIDTH] IN BLOOD BY AUTOMATED COUNT: 18.6 % (ref 11.5–14.5)
EST. GFR  (NO RACE VARIABLE): >60 ML/MIN/1.73 M^2
GLUCOSE SERPL-MCNC: 100 MG/DL (ref 70–110)
HCT VFR BLD AUTO: 36.2 % (ref 40–54)
HGB BLD-MCNC: 10.5 G/DL (ref 14–18)
IMM GRANULOCYTES # BLD AUTO: 0.02 K/UL (ref 0–0.04)
IMM GRANULOCYTES NFR BLD AUTO: 0.2 % (ref 0–0.5)
LIPASE SERPL-CCNC: 75 U/L (ref 4–60)
LYMPHOCYTES # BLD AUTO: 3 K/UL (ref 1–4.8)
LYMPHOCYTES NFR BLD: 31.3 % (ref 18–48)
MCH RBC QN AUTO: 23.8 PG (ref 27–31)
MCHC RBC AUTO-ENTMCNC: 29 G/DL (ref 32–36)
MCV RBC AUTO: 82 FL (ref 82–98)
MONOCYTES # BLD AUTO: 0.8 K/UL (ref 0.3–1)
MONOCYTES NFR BLD: 8.6 % (ref 4–15)
NEUTROPHILS # BLD AUTO: 5.5 K/UL (ref 1.8–7.7)
NEUTROPHILS NFR BLD: 56.8 % (ref 38–73)
NRBC BLD-RTO: 0 /100 WBC
PLATELET # BLD AUTO: 389 K/UL (ref 150–450)
PMV BLD AUTO: 10 FL (ref 9.2–12.9)
POTASSIUM SERPL-SCNC: 3.3 MMOL/L (ref 3.5–5.1)
PROT SERPL-MCNC: 8.6 G/DL (ref 6–8.4)
RBC # BLD AUTO: 4.41 M/UL (ref 4.6–6.2)
SODIUM SERPL-SCNC: 139 MMOL/L (ref 136–145)
WBC # BLD AUTO: 9.62 K/UL (ref 3.9–12.7)

## 2024-01-04 PROCEDURE — 96376 TX/PRO/DX INJ SAME DRUG ADON: CPT

## 2024-01-04 PROCEDURE — 96361 HYDRATE IV INFUSION ADD-ON: CPT

## 2024-01-04 PROCEDURE — 85025 COMPLETE CBC W/AUTO DIFF WBC: CPT | Performed by: PHYSICIAN ASSISTANT

## 2024-01-04 PROCEDURE — 25000003 PHARM REV CODE 250: Performed by: PHYSICIAN ASSISTANT

## 2024-01-04 PROCEDURE — 83690 ASSAY OF LIPASE: CPT | Performed by: PHYSICIAN ASSISTANT

## 2024-01-04 PROCEDURE — 63600175 PHARM REV CODE 636 W HCPCS: Performed by: PHYSICIAN ASSISTANT

## 2024-01-04 PROCEDURE — 96374 THER/PROPH/DIAG INJ IV PUSH: CPT

## 2024-01-04 PROCEDURE — 96375 TX/PRO/DX INJ NEW DRUG ADDON: CPT

## 2024-01-04 PROCEDURE — 80053 COMPREHEN METABOLIC PANEL: CPT | Performed by: PHYSICIAN ASSISTANT

## 2024-01-04 PROCEDURE — 99284 EMERGENCY DEPT VISIT MOD MDM: CPT | Mod: 25

## 2024-01-04 RX ORDER — HYDROMORPHONE HYDROCHLORIDE 1 MG/ML
1 INJECTION, SOLUTION INTRAMUSCULAR; INTRAVENOUS; SUBCUTANEOUS
Status: COMPLETED | OUTPATIENT
Start: 2024-01-04 | End: 2024-01-04

## 2024-01-04 RX ORDER — KETOROLAC TROMETHAMINE 30 MG/ML
10 INJECTION, SOLUTION INTRAMUSCULAR; INTRAVENOUS
Status: COMPLETED | OUTPATIENT
Start: 2024-01-04 | End: 2024-01-04

## 2024-01-04 RX ORDER — ONDANSETRON 2 MG/ML
4 INJECTION INTRAMUSCULAR; INTRAVENOUS
Status: COMPLETED | OUTPATIENT
Start: 2024-01-04 | End: 2024-01-04

## 2024-01-04 RX ORDER — POTASSIUM CHLORIDE 20 MEQ/1
40 TABLET, EXTENDED RELEASE ORAL ONCE
Status: COMPLETED | OUTPATIENT
Start: 2024-01-04 | End: 2024-01-04

## 2024-01-04 RX ADMIN — HYDROMORPHONE HYDROCHLORIDE 1 MG: 1 INJECTION, SOLUTION INTRAMUSCULAR; INTRAVENOUS; SUBCUTANEOUS at 08:01

## 2024-01-04 RX ADMIN — SODIUM CHLORIDE 1000 ML: 9 INJECTION, SOLUTION INTRAVENOUS at 08:01

## 2024-01-04 RX ADMIN — POTASSIUM CHLORIDE 40 MEQ: 1500 TABLET, EXTENDED RELEASE ORAL at 10:01

## 2024-01-04 RX ADMIN — HYDROMORPHONE HYDROCHLORIDE 1 MG: 1 INJECTION, SOLUTION INTRAMUSCULAR; INTRAVENOUS; SUBCUTANEOUS at 10:01

## 2024-01-04 RX ADMIN — ONDANSETRON 4 MG: 2 INJECTION INTRAMUSCULAR; INTRAVENOUS at 08:01

## 2024-01-04 RX ADMIN — KETOROLAC TROMETHAMINE 10 MG: 30 INJECTION, SOLUTION INTRAMUSCULAR; INTRAVENOUS at 08:01

## 2024-01-05 VITALS
HEART RATE: 70 BPM | RESPIRATION RATE: 16 BRPM | TEMPERATURE: 98 F | OXYGEN SATURATION: 98 % | HEIGHT: 70 IN | BODY MASS INDEX: 20.04 KG/M2 | WEIGHT: 140 LBS | SYSTOLIC BLOOD PRESSURE: 120 MMHG | DIASTOLIC BLOOD PRESSURE: 70 MMHG

## 2024-01-05 PROCEDURE — 63600175 PHARM REV CODE 636 W HCPCS: Performed by: NURSE PRACTITIONER

## 2024-01-05 RX ORDER — HYDROMORPHONE HYDROCHLORIDE 1 MG/ML
1 INJECTION, SOLUTION INTRAMUSCULAR; INTRAVENOUS; SUBCUTANEOUS
Status: COMPLETED | OUTPATIENT
Start: 2024-01-05 | End: 2024-01-05

## 2024-01-05 RX ADMIN — HYDROMORPHONE HYDROCHLORIDE 1 MG: 1 INJECTION, SOLUTION INTRAMUSCULAR; INTRAVENOUS; SUBCUTANEOUS at 12:01

## 2024-01-05 NOTE — ED PROVIDER NOTES
Encounter Date: 1/4/2024       History     Chief Complaint   Patient presents with    Abdominal Pain     Hx of pancreatitis      24 y/o M with history of necrotizing pancreatitis( S/p EUS, and EGD w/ necrostomy, had pancreatic stent removed at [facility] on 12/13), polycythemia vera, HIV presents to the ED c/o abdominal pain.  Has been dealing with acute flare since Christmas - has been seen in the ED almost daily since then for abdominal pain.  Last ED visit, 1/2 and states he was better but pain returned last night.  Now is also having back pain with is abnormal. Pain is 9.5/10 to the epigastric region, not relieved with home toradol, dilaudid, robaxin, promethazine. He reports associated nausea/vomiting unable to tolerate po, chills, headache. Denies fever, chest pain, SOB, diarrhea, dysuria, hematuria, lightheadedness.     The history is provided by the patient.     Review of patient's allergies indicates:   Allergen Reactions    [place] Swelling    Pecan nut Swelling    Penicillins Hives     Tolerates cephalosporins    Tolerated piperacillin/tazobactam 11/2023    Soy Other (See Comments)    Sulfa (sulfonamide antibiotics) Hives     Past Medical History:   Diagnosis Date    Acute necrotizing pancreatitis 09/20/2023    Alcohol use disorder 10/05/2023    Asthma     Hepatitis C antibody positive in blood 09/18/2023 9/2023 PCR negative    HIV infection 10/2021    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     Past Surgical History:   Procedure Laterality Date    ENDOSCOPIC ULTRASOUND OF UPPER GASTROINTESTINAL TRACT N/A 10/23/2023    Procedure: ULTRASOUND, UPPER GI TRACT, ENDOSCOPIC;  Surgeon: Emil Villatoro MD;  Location: [facility] ([address] [place] [address]);  Service: Endoscopy;  Laterality: N/A;    ERCP N/A 10/23/2023    Procedure:  ERCP (ENDOSCOPIC RETROGRADE CHOLANGIOPANCREATOGRAPHY);  Surgeon: Emil Villatoro MD;  Location: [facility] ([address] [place] [address]);  Service: Endoscopy;  Laterality: N/A;    ESOPHAGOGASTRODUODENOSCOPY N/A 9/18/2023    Procedure: EGD (ESOPHAGOGASTRODUODENOSCOPY);  Surgeon: Kg Cleaning MD;  Location: [address]);  Service: Endoscopy;  Laterality: N/A;     Family History   Problem Relation Age of Onset    Pancreatitis Mother     Cancer Mother     Ovarian cancer Mother     No Known Problems Father     Cancer Brother     Breast cancer Maternal Grandmother      Social History     Tobacco Use    Smoking status: Former     Types: Cigarettes    Smokeless tobacco: Never   Substance Use Topics    Alcohol use: Not Currently    Drug use: Never     Review of Systems   Constitutional:  Positive for chills.   Gastrointestinal:  Positive for abdominal pain, nausea and vomiting.   Neurological:  Positive for headaches.       Physical Exam     Initial Vitals [01/04/24 1852]   BP Pulse Resp Temp SpO2   137/69 92 20 98.8 °F (37.1 °C) 99 %      MAP       --         Physical Exam    Nursing note and vitals reviewed.  Constitutional: He appears well-developed and well-nourished. He is not diaphoretic. No distress.   HENT:   Head: Normocephalic and atraumatic.   Cardiovascular:  Normal rate, regular rhythm and normal heart sounds.     Exam reveals no gallop and no friction rub.       No murmur heard.  Pulmonary/Chest: Breath sounds normal. He has no wheezes. He has no rhonchi. He has no rales.   Abdominal: Abdomen is soft. Bowel sounds are normal. There is abdominal tenderness (generalized, worst in the epigastric region). There is no rebound and no guarding.   Musculoskeletal:         General: Normal range of motion.     Neurological: He is alert and oriented to person, place, and time.   Skin: Skin is warm and dry.   Psychiatric: He has a normal mood and affect.         ED Course   Procedures  Labs Reviewed   CBC W/ AUTO DIFFERENTIAL -  Abnormal; Notable for the following components:       Result Value    RBC 4.41 (*)     Hemoglobin 10.5 (*)     Hematocrit 36.2 (*)     MCH 23.8 (*)     MCHC 29.0 (*)     RDW 18.6 (*)     All other components within normal limits   COMPREHENSIVE METABOLIC PANEL - Abnormal; Notable for the following components:    Potassium 3.3 (*)     Total Protein 8.6 (*)     ALT 6 (*)     All other components within normal limits   LIPASE - Abnormal; Notable for the following components:    Lipase 75 (*)     All other components within normal limits   URINALYSIS, REFLEX TO URINE CULTURE          Imaging Results    None          Medications   HYDROmorphone injection 1 mg (has no administration in time range)   potassium chloride SA CR tablet 40 mEq (has no administration in time range)   sodium chloride 0.9% bolus 1,000 mL 1,000 mL (1,000 mLs Intravenous New Bag 1/4/24 2048)   ketorolac injection 9.999 mg (9.999 mg Intravenous Given 1/4/24 2045)   HYDROmorphone injection 1 mg (1 mg Intravenous Given 1/4/24 2047)   ondansetron injection 4 mg (4 mg Intravenous Given 1/4/24 2044)     Medical Decision Making  Chart review:  12/25-12/28 admitted for pancreatitis.   12/29 seen in the ED for abdominal pain, admitted  12/30 ED visit for abdominal pain  12/31 ED visit for abdominal pain  1/1 ED visit for abdominal pain  1/2 ED visit for abdominal pain    Scheduled for Pain Mgmt appt 1/9 and GI 1/26    Amount and/or Complexity of Data Reviewed  Labs: ordered.    Risk  Prescription drug management.         APC / Resident Notes:   24 y/o M with history of necrotizing pancreatitis( S/p EUS, and EGD w/ necrostomy, had pancreatic stent removed at [facility] on 12/13), polycythemia vera, HIV presents to the ED c/o abdominal pain. VSS. Well appearing, resting comfortably. Abdomen soft, generalized tenderness, worse in the epigastric region. No CVA tenderness. DDx includes but is not limited to pancreatitis, dehydration, CHARY, UTI. Will get labs, give IVF,  dilaudid, zofran.     No leukocytosis. Mild hypokalemia, K 3.3, orally replaced. Lipase 75.    No improvement of pain with first dose of IV dilaudid, toradol. Second dose of IV dilaudid ordered.     9:58 PM  Signed out to Radha Boo NP pending re-evaluation.         ED Course as of 01/09/24 0837   Thu Jan 04, 2024   2330 Pt states he is still having significant pain and would like to be admitted to pain management.  [AT]   7477 Pt does not want the CT.  [AT]   Fri Jan 05, 2024   0026 Discussed case with HM who feels that he should follow up outpatient with pain management. Discussed with patient and he is agreeable to the outpatient.  [AT]      ED Course User Index  [AT] Radha Boo, CHRISSY                     Clinical Impression:  Final diagnoses:  [R10.9] Abdominal pain, unspecified abdominal location (Primary)                 Olive Ang PA-C  01/04/24 9969       Shaq Estes MD  01/09/24 0828

## 2024-01-05 NOTE — ED PROVIDER NOTES
ED Physician Hand-off Note:    ED Course: I assumed care of patient from off-going ED physician assistant, Olive Ang.  Briefly, Patient is a 24 y/o male with chronic abdominal pain from chronic pancreatitis.    At the time of signout plan was pending pain management for chronic pancreatitis.    Disposition: Discharge with pain management follow up    Patient comfortable with discharge plan. Patient counseled regarding exam, results, diagnosis, treatment, and plan.    Impression: chronic pain    Final diagnoses:  [R10.9] Abdominal pain, unspecified abdominal location (Primary)  [G89.4] Chronic pain syndrome           Radha Boo, [facility]  01/05/24 0037

## 2024-01-05 NOTE — ED TRIAGE NOTES
Tasia Cardona, a 23 y.o. male presents to the ED w/ complaint of LUQ abdominal pain. Pt also stating pain radiating to back. Hx of pancreatitis.     Triage note:  Chief Complaint   Patient presents with    Abdominal Pain     Hx of pancreatitis      Review of patient's allergies indicates:   Allergen Reactions    [place] Swelling    Pecan nut Swelling    Penicillins Hives     Tolerates cephalosporins    Tolerated piperacillin/tazobactam 11/2023    Soy Other (See Comments)    Sulfa (sulfonamide antibiotics) Hives     Past Medical History:   Diagnosis Date    Acute necrotizing pancreatitis 09/20/2023    Alcohol use disorder 10/05/2023    Asthma     Hepatitis C antibody positive in blood 09/18/2023 9/2023 PCR negative    HIV infection 10/2021    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    Patient identifiers for Tasia Cardona checked and correct.    LOC: The patient is awake, alert and aware of environment with an appropriate affect, the patient is oriented x 4 and speaking appropriately.    APPEARANCE: Patient resting comfortably and in no acute distress, patient is clean and well groomed, patient's clothing is properly fastened.    SKIN: The skin is warm and dry, color consistent with ethnicity, patient has normal skin turgor and moist mucus membranes, skin intact, no breakdown or bruising noted.    MUSCULOSKELETAL: Patient moving all extremities well, no obvious swelling or deformities noted.    RESPIRATORY: Airway is open and patent, respirations are spontaneous and even, patient has a normal effort and rate.    CARDIAC: Patient has a normal rate and rhythm, no periphreal edema noted, capillary refill < 3 seconds.    NEUROLOGIC: Eyes open spontaneously, PERRL, behavior appropriate to situation, follows  commands, facial expression symmetrical, bilateral hand grasp equal and even, purposeful motor response noted, normal sensation in all extremities.     HEENT: No abnormalities noted. White sclera and pupils equal round and reactive to light. Denies headache, dizziness.     : Pt voids independently, denies dysuria, hematuria, frequency.

## 2024-01-06 ENCOUNTER — HOSPITAL ENCOUNTER (EMERGENCY)
Facility: HOSPITAL | Age: 24
Discharge: HOME | End: 2024-01-07
Attending: EMERGENCY MEDICINE

## 2024-01-06 DIAGNOSIS — K86.1 CHRONIC PANCREATITIS, UNSPECIFIED PANCREATITIS TYPE: Primary | ICD-10-CM

## 2024-01-06 DIAGNOSIS — R10.13 EPIGASTRIC ABDOMINAL PAIN: ICD-10-CM

## 2024-01-06 DIAGNOSIS — G89.4 CHRONIC PAIN SYNDROME: ICD-10-CM

## 2024-01-06 LAB
ALBUMIN SERPL BCP-MCNC: 3.5 G/DL (ref 3.5–5.2)
ALP SERPL-CCNC: 88 U/L (ref 55–135)
ALT SERPL W/O P-5'-P-CCNC: <5 U/L (ref 10–44)
ANION GAP SERPL CALC-SCNC: 10 MMOL/L (ref 8–16)
AST SERPL-CCNC: 31 U/L (ref 10–40)
BASOPHILS # BLD AUTO: 0.04 K/UL (ref 0–0.2)
BASOPHILS NFR BLD: 0.4 % (ref 0–1.9)
BILIRUB SERPL-MCNC: 0.4 MG/DL (ref 0.1–1)
BUN SERPL-MCNC: 5 MG/DL (ref 6–30)
BUN SERPL-MCNC: 6 MG/DL (ref 6–20)
CALCIUM SERPL-MCNC: 8.9 MG/DL (ref 8.7–10.5)
CHLORIDE SERPL-SCNC: 105 MMOL/L (ref 95–110)
CHLORIDE SERPL-SCNC: 107 MMOL/L (ref 95–110)
CO2 SERPL-SCNC: 21 MMOL/L (ref 23–29)
CREAT SERPL-MCNC: 0.6 MG/DL (ref 0.5–1.4)
CREAT SERPL-MCNC: 0.7 MG/DL (ref 0.5–1.4)
DIFFERENTIAL METHOD BLD: ABNORMAL
EOSINOPHIL # BLD AUTO: 0.2 K/UL (ref 0–0.5)
EOSINOPHIL NFR BLD: 2.1 % (ref 0–8)
ERYTHROCYTE [DISTWIDTH] IN BLOOD BY AUTOMATED COUNT: 18.7 % (ref 11.5–14.5)
EST. GFR  (NO RACE VARIABLE): >60 ML/MIN/1.73 M^2
GLUCOSE SERPL-MCNC: 91 MG/DL (ref 70–110)
GLUCOSE SERPL-MCNC: 97 MG/DL (ref 70–110)
HCT VFR BLD AUTO: 33.2 % (ref 40–54)
HCT VFR BLD CALC: 38 %PCV (ref 36–54)
HGB BLD-MCNC: 9.5 G/DL (ref 14–18)
IMM GRANULOCYTES # BLD AUTO: 0.01 K/UL (ref 0–0.04)
IMM GRANULOCYTES NFR BLD AUTO: 0.1 % (ref 0–0.5)
LACTATE SERPL-SCNC: 1.4 MMOL/L (ref 0.5–2.2)
LIPASE SERPL-CCNC: 58 U/L (ref 4–60)
LYMPHOCYTES # BLD AUTO: 3.2 K/UL (ref 1–4.8)
LYMPHOCYTES NFR BLD: 33.8 % (ref 18–48)
MCH RBC QN AUTO: 23.3 PG (ref 27–31)
MCHC RBC AUTO-ENTMCNC: 28.6 G/DL (ref 32–36)
MCV RBC AUTO: 81 FL (ref 82–98)
MONOCYTES # BLD AUTO: 0.9 K/UL (ref 0.3–1)
MONOCYTES NFR BLD: 9.9 % (ref 4–15)
NEUTROPHILS # BLD AUTO: 5 K/UL (ref 1.8–7.7)
NEUTROPHILS NFR BLD: 53.7 % (ref 38–73)
NRBC BLD-RTO: 0 /100 WBC
PLATELET # BLD AUTO: 372 K/UL (ref 150–450)
PMV BLD AUTO: 9.9 FL (ref 9.2–12.9)
POC IONIZED CALCIUM: 1.11 MMOL/L (ref 1.06–1.42)
POC TCO2 (MEASURED): 25 MMOL/L (ref 23–29)
POTASSIUM BLD-SCNC: 4.8 MMOL/L (ref 3.5–5.1)
POTASSIUM SERPL-SCNC: 4.9 MMOL/L (ref 3.5–5.1)
PROT SERPL-MCNC: 7.9 G/DL (ref 6–8.4)
RBC # BLD AUTO: 4.08 M/UL (ref 4.6–6.2)
SAMPLE: ABNORMAL
SODIUM BLD-SCNC: 138 MMOL/L (ref 136–145)
SODIUM SERPL-SCNC: 138 MMOL/L (ref 136–145)
WBC # BLD AUTO: 9.31 K/UL (ref 3.9–12.7)

## 2024-01-06 PROCEDURE — 96361 HYDRATE IV INFUSION ADD-ON: CPT

## 2024-01-06 PROCEDURE — 83690 ASSAY OF LIPASE: CPT | Performed by: EMERGENCY MEDICINE

## 2024-01-06 PROCEDURE — 83605 ASSAY OF LACTIC ACID: CPT | Performed by: EMERGENCY MEDICINE

## 2024-01-06 PROCEDURE — 85025 COMPLETE CBC W/AUTO DIFF WBC: CPT | Performed by: EMERGENCY MEDICINE

## 2024-01-06 PROCEDURE — 80053 COMPREHEN METABOLIC PANEL: CPT | Performed by: EMERGENCY MEDICINE

## 2024-01-06 PROCEDURE — 93010 ELECTROCARDIOGRAM REPORT: CPT | Mod: ,,, | Performed by: INTERNAL MEDICINE

## 2024-01-06 PROCEDURE — 80047 BASIC METABLC PNL IONIZED CA: CPT

## 2024-01-06 PROCEDURE — 96375 TX/PRO/DX INJ NEW DRUG ADDON: CPT

## 2024-01-06 PROCEDURE — 96374 THER/PROPH/DIAG INJ IV PUSH: CPT

## 2024-01-06 PROCEDURE — 93005 ELECTROCARDIOGRAM TRACING: CPT

## 2024-01-06 PROCEDURE — 99285 EMERGENCY DEPT VISIT HI MDM: CPT | Mod: 25

## 2024-01-06 PROCEDURE — 63600175 PHARM REV CODE 636 W HCPCS: Performed by: EMERGENCY MEDICINE

## 2024-01-06 PROCEDURE — 96376 TX/PRO/DX INJ SAME DRUG ADON: CPT

## 2024-01-06 RX ORDER — ONDANSETRON HYDROCHLORIDE 2 MG/ML
4 INJECTION, SOLUTION INTRAVENOUS
Status: COMPLETED | OUTPATIENT
Start: 2024-01-06 | End: 2024-01-06

## 2024-01-06 RX ORDER — DROPERIDOL 2.5 MG/ML
1.25 INJECTION, SOLUTION INTRAMUSCULAR; INTRAVENOUS
Status: COMPLETED | OUTPATIENT
Start: 2024-01-07 | End: 2024-01-06

## 2024-01-06 RX ORDER — HYDROMORPHONE HYDROCHLORIDE 1 MG/ML
1 INJECTION, SOLUTION INTRAMUSCULAR; INTRAVENOUS; SUBCUTANEOUS
Status: COMPLETED | OUTPATIENT
Start: 2024-01-06 | End: 2024-01-06

## 2024-01-06 RX ORDER — LIDOCAINE HYDROCHLORIDE 20 MG/ML
15 SOLUTION OROPHARYNGEAL ONCE
Status: DISCONTINUED | OUTPATIENT
Start: 2024-01-06 | End: 2024-01-07 | Stop reason: HOSPADM

## 2024-01-06 RX ORDER — KETOROLAC TROMETHAMINE 30 MG/ML
10 INJECTION, SOLUTION INTRAMUSCULAR; INTRAVENOUS
Status: COMPLETED | OUTPATIENT
Start: 2024-01-06 | End: 2024-01-06

## 2024-01-06 RX ORDER — ALUMINUM HYDROXIDE, MAGNESIUM HYDROXIDE, AND SIMETHICONE 1200; 120; 1200 MG/30ML; MG/30ML; MG/30ML
30 SUSPENSION ORAL ONCE
Status: DISCONTINUED | OUTPATIENT
Start: 2024-01-06 | End: 2024-01-07 | Stop reason: HOSPADM

## 2024-01-06 RX ORDER — DROPERIDOL 2.5 MG/ML
1.25 INJECTION, SOLUTION INTRAMUSCULAR; INTRAVENOUS ONCE
Status: COMPLETED | OUTPATIENT
Start: 2024-01-06 | End: 2024-01-06

## 2024-01-06 RX ADMIN — HYDROMORPHONE HYDROCHLORIDE 1 MG: 1 INJECTION, SOLUTION INTRAMUSCULAR; INTRAVENOUS; SUBCUTANEOUS at 07:01

## 2024-01-06 RX ADMIN — SODIUM CHLORIDE, POTASSIUM CHLORIDE, SODIUM LACTATE AND CALCIUM CHLORIDE 1000 ML: 600; 310; 30; 20 INJECTION, SOLUTION INTRAVENOUS at 07:01

## 2024-01-06 RX ADMIN — DROPERIDOL 1.25 MG: 2.5 INJECTION, SOLUTION INTRAMUSCULAR; INTRAVENOUS at 11:01

## 2024-01-06 RX ADMIN — IOHEXOL 75 ML: 350 INJECTION, SOLUTION INTRAVENOUS at 11:01

## 2024-01-06 RX ADMIN — DROPERIDOL 1.25 MG: 2.5 INJECTION, SOLUTION INTRAMUSCULAR; INTRAVENOUS at 09:01

## 2024-01-06 RX ADMIN — HYDROMORPHONE HYDROCHLORIDE 1 MG: 1 INJECTION, SOLUTION INTRAMUSCULAR; INTRAVENOUS; SUBCUTANEOUS at 09:01

## 2024-01-06 RX ADMIN — KETOROLAC TROMETHAMINE 10 MG: 30 INJECTION, SOLUTION INTRAMUSCULAR; INTRAVENOUS at 09:01

## 2024-01-06 RX ADMIN — ONDANSETRON 4 MG: 2 INJECTION INTRAMUSCULAR; INTRAVENOUS at 07:01

## 2024-01-07 VITALS
BODY MASS INDEX: 20.04 KG/M2 | HEIGHT: 70 IN | DIASTOLIC BLOOD PRESSURE: 84 MMHG | OXYGEN SATURATION: 100 % | TEMPERATURE: 98 F | HEART RATE: 88 BPM | RESPIRATION RATE: 16 BRPM | SYSTOLIC BLOOD PRESSURE: 120 MMHG | WEIGHT: 140 LBS

## 2024-01-07 PROCEDURE — 63600175 PHARM REV CODE 636 W HCPCS: Performed by: EMERGENCY MEDICINE

## 2024-01-07 PROCEDURE — 25500020 PHARM REV CODE 255: Performed by: EMERGENCY MEDICINE

## 2024-01-07 RX ORDER — OMEPRAZOLE 20 MG/1
20 CAPSULE, DELAYED RELEASE ORAL DAILY
Qty: 30 CAPSULE | Refills: 11 | Status: SHIPPED | OUTPATIENT
Start: 2024-01-07 | End: 2024-05-01

## 2024-01-07 NOTE — ED NOTES
I-STAT Chem-8+ Results:   Value Reference Range   Sodium 138 136-145 mmol/L   Potassium  4.8 3.5-5.1 mmol/L   Chloride 105  mmol/L   Ionized Calcium 1.11 1.06-1.42 mmol/L   CO2 (measured) 25 23-29 mmol/L   Glucose 97  mg/dL   BUN 5 6-30 mg/dL   Creatinine 0.6 0.5-1.4 mg/dL   Hematocrit 38 36-54%

## 2024-01-07 NOTE — PLAN OF CARE
Management Plan  Date Initiated No value filed.  Patient Name: Tasia Cardona               Pina Jones, AAMIR           Educate, and encourage pt to seek treatment for non emergent situations via pcp to decrease the number of non emergent ed visits.             If patient presents to the ED, the suggested plan of care:  Educate patient on the importance of seeking treatment via pcp  for non emergent situations, and on the importance of self care.       If patient is admitted, the suggested plan of care:  Educate patient on the importance of self care and treatment plan compliance.      Mental Health Diagnosis:   Social History     Substance and Sexual Activity   Alcohol Use Not Currently     Social History     Substance and Sexual Activity   Drug Use Never      Goals        DIET - OTHER      Improve overall well-being      Improve overall well-being       Pt was agreeable with  decreasing the number of non emergent ed visit by securing a pcp.                No data recorded  Social Support: eDnice Cardona Mother    Neighborhood: [place]    Social Determinants of Health     Tobacco Use: Medium Risk (1/6/2024)    Patient History     Smoking Tobacco Use: Former     Smokeless Tobacco Use: Never     Passive Exposure: Not on file   Alcohol Use: Not At Risk (1/7/2024)    AUDIT-C     Frequency of Alcohol Consumption: Never     Average Number of Drinks: Patient does not drink     Frequency of Binge Drinking: Never   Financial Resource Strain: Low Risk  (1/7/2024)    Overall Financial Resource Strain (CARDIA)     Difficulty of Paying Living Expenses: Not hard at all   Food Insecurity: No Food Insecurity (1/7/2024)    Hunger Vital Sign     Worried About Running Out of Food in the Last Year: Never true     Ran Out of Food in the Last Year: Never true   Transportation Needs: No Transportation Needs (1/7/2024)    PRAPARE - Transportation     Lack of Transportation (Medical): No     Lack of Transportation (Non-Medical):  No   Physical Activity: Sufficiently Active (1/7/2024)    Exercise Vital Sign     Days of Exercise per Week: 3 days     Minutes of Exercise per Session: 60 min   Recent Concern: Physical Activity - Inactive (12/18/2023)    Exercise Vital Sign     Days of Exercise per Week: 0 days     Minutes of Exercise per Session: 0 min   Stress: No Stress Concern Present (1/7/2024)    Citizen of [place] of Occupational Health - Occupational Stress Questionnaire     Feeling of Stress : Not at all   Social Connections: Moderately Isolated (1/7/2024)    Social Connection and Isolation Panel [NHANES]     Frequency of Communication with Friends and Family: Once a week     Frequency of Social Gatherings with Friends and Family: More than three times a week     Attends Jew Services: Never     Active Member of Clubs or Organizations: No     Attends Club or Organization Meetings: Patient declined     Marital Status:    Housing Stability: Low Risk  (1/7/2024)    Housing Stability Vital Sign     Unable to Pay for Housing in the Last Year: No     Number of Places Lived in the Last Year: 1     Unstable Housing in the Last Year: No   Depression: Not on file         Assessment:  Are you on dialysis?: No    Does the patient currently use HME?: No    Current Living Arrangements: home    How do you get to doctors appointments?: car, drives self       Recommendations: Pt will attend pt's scheduled pain management appointment on 1-9-2023, and sw will assist in the search to secure a pcp for pt.         Alex Rodrigez LMSW  Case Management  Emergency Department  [phone]

## 2024-01-07 NOTE — PROVIDER PROGRESS NOTES - EMERGENCY DEPT.
Signout Note    I received signout from the previous provider.     Chief complaint:  Abdominal Pain (Has pancreatitis , vomiting)      Pertinent history &exam:  aTsia Cardona is a 23 y.o. male with pertinent PMH of chronic pancreatitis, asthma, HIV, alcohol use disorder, opioid dependence who presented to emergency department with complaint of vomiting and abdominal pain.  Labs here are reassuring.  Received Dilaudid x2, Toradol.  Signed out pending CT for final disposition.    Vitals:    01/07/24 0016   BP: 117/72   Pulse: 81   Resp: 18   Temp: 97.9 °F (36.6 °C)       Imaging Studies:    CT Abdomen Pelvis With IV Contrast NO Oral Contrast   Final Result      Sequela of pancreatitis with probable small residual peripancreatic collections and/or pancreatic necrosis.  No large peripancreatic fluid collection.  No new peripancreatic collection.  Pancreatic findings are overall similar when compared with 12/29/2023.      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      Wall thickening of the distal stomach with adjacent inflammatory changes, suggestive of gastritis and/or peptic ulcer disease.      Splenomegaly.      Trace abdominopelvic free fluid.      Other findings discussed in the body of the report.         Electronically signed by: Reyes Gary MD   Date:    01/06/2024   Time:    23:42          Medications Given:  Medications   aluminum-magnesium hydroxide-simethicone 200-200-20 mg/5 mL suspension 30 mL (30 mLs Oral Not Given 1/6/24 2116)     And   LIDOcaine viscous HCl 2% oral solution 15 mL (15 mLs Oral Not Given 1/6/24 2117)   HYDROmorphone injection 1 mg (1 mg Intravenous Given 1/6/24 1953)   ondansetron injection 4 mg (4 mg Intravenous Given 1/6/24 1952)   lactated ringers bolus 1,000 mL (0 mLs Intravenous Stopped 1/6/24 2101)   droPERidol injection 1.25 mg (1.25 mg Intravenous Given 1/6/24 2156)   ketorolac injection 9.999 mg (9.999 mg Intravenous Given  1/6/24 2111)   HYDROmorphone injection 1 mg (1 mg Intravenous Given 1/6/24 2113)   iohexoL (OMNIPAQUE 350) injection 75 mL (75 mLs Intravenous Given 1/6/24 2308)   droPERidol injection 1.25 mg (1.25 mg Intravenous Given 1/6/24 2356)       Pending Items/ MDM:  23 y.o. male with above complaint.  CT without acute abnormality.  Patient complaining of ongoing pain.  I consulted Internal Medicine, who discussed this patient's chronic pain with him in the ER.  He has follow up with pain management.  Social work will attempt to set up primary care follow-up for him.  Hospitalist and myself are in agreement that this patient does not require IV opioids for her his chronic pain disorder.  He received droperidol in the ER for his chronic pain and was discharged.    Diagnostic Impression:    1. Chronic pancreatitis, unspecified pancreatitis type    2. Epigastric abdominal pain    3. Chronic pain syndrome         ED Disposition Condition    Discharge Stable          ED Prescriptions       Medication Sig Dispense Start Date End Date Auth. Provider    omeprazole (PRILOSEC) 20 MG capsule Take 1 capsule (20 mg total) by mouth once daily. 30 capsule 1/7/2024 1/6/2025 Roxy Torres MD          Follow-up Information       Follow up With Specialties Details Why Contact Info Additional Information    Pina Jones, NP Family Medicine Schedule an appointment as soon as possible for a visit   [address] [facility] [address]  [phone]       [facility] PAIN MANAGEMENT Pain Medicine Schedule an appointment as soon as possible for a visit   [address] [facility] [address]  [phone]     [facility] - [facility] 4th Fl Gastroenterology Schedule an appointment as soon as possible for a visit   [address] [facility] [id number] [phone] GI Center & Urology - Main Building, 4th Floor Please park in [facility] and take Atrium elevator          Roxy Torres MD  Emergency  Medicine

## 2024-01-07 NOTE — PLAN OF CARE
01/07/24 0134   Post-Acute Status   Post-Acute Authorization Other   Other Status [facility]   Hospital Resources/Appts/Education Provided   (Pt was informed sw will attempt to schedule pt [place] to establish pt with a pcp)   Discharge Delays None known at this time   Discharge Plan   Discharge Plan A Home with family   Discharge Plan B Home         Pt stated he does not have a pcp, but pt has a scheduled appt with pain management on  1-9-2024. Pt was agreeable with edin assisting pt secure a pcp.      Alex Rodrigez LMSW  Case Management  Emergency Department  [phone]

## 2024-01-07 NOTE — ED PROVIDER NOTES
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  No fever or chills reported.  He reports decreased p.o. intake secondary to nausea and epigastric pain.  No black or bloody stool, diarrhea or urinary complaints reported.  He denies any recent injuries or recent illness.  Otherwise no other complaints.    Review of patient's allergies indicates:   Allergen Reactions    [place] Swelling    Pecan nut Swelling    Penicillins Hives     Tolerates cephalosporins    Tolerated piperacillin/tazobactam 11/2023    Soy Other (See Comments)    Sulfa (sulfonamide antibiotics) Hives     Past Medical History:   Diagnosis Date    Acute necrotizing pancreatitis 09/20/2023    Alcohol use disorder 10/05/2023    Asthma     Hepatitis C antibody positive in blood 09/18/2023 9/2023 PCR negative    HIV infection 10/2021    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     Past Surgical History:   Procedure Laterality Date    ENDOSCOPIC ULTRASOUND OF UPPER GASTROINTESTINAL TRACT N/A 10/23/2023    Procedure: ULTRASOUND, UPPER GI TRACT, ENDOSCOPIC;  Surgeon: Emil Villatoro MD;  Location: [address]);  Service: Endoscopy;  Laterality: N/A;    ERCP N/A 10/23/2023    Procedure: ERCP (ENDOSCOPIC RETROGRADE CHOLANGIOPANCREATOGRAPHY);  Surgeon: Emil Villatoro MD;  Location: [address]);  Service: Endoscopy;  Laterality: N/A;     ESOPHAGOGASTRODUODENOSCOPY N/A 9/18/2023    Procedure: EGD (ESOPHAGOGASTRODUODENOSCOPY);  Surgeon: Kg Cleaning MD;  Location: [facility] ([address] [place] [address]);  Service: Endoscopy;  Laterality: N/A;     Family History   Problem Relation Age of Onset    Pancreatitis Mother     Cancer Mother     Ovarian cancer Mother     No Known Problems Father     Cancer Brother     Breast cancer Maternal Grandmother      Social History     Tobacco Use    Smoking status: Former     Types: Cigarettes    Smokeless tobacco: Never   Substance Use Topics    Alcohol use: Not Currently    Drug use: Never     Review of Systems    Physical Exam     Initial Vitals [01/06/24 1813]   BP Pulse Resp Temp SpO2   123/71 (!) 112 18 98.7 °F (37.1 °C) 100 %      MAP       --         Vitals:    01/06/24 2034 01/06/24 2113 01/07/24 0016 01/07/24 0216   BP: 111/72  117/72 120/84   Pulse: 77  81 88   Resp: 20 20 18 16   Temp: 98.4 °F (36.9 °C)  97.9 °F (36.6 °C)    TempSrc: Oral  Oral    SpO2: 96%  100% 100%   Weight:       Height:           Physical Exam  GENERAL: Well-appearing and Non-Toxic; Well-Nourished; NAD despite reported epigastric pain.  HEENT: AT/NC; anicteric; speaking full sentences with no drooling.  NECK: Supple, FROM, no accessory muscle use.  HEART: Regular rate and rhythm, no M/G/T.  LUNGS: No Tachypnea for appreciated work of breathing, No Retractions, and CTA B/L with no W/R/R.  ABDOMEN: Soft, ND, +Epigastric TTP with no rigidity or involuntary guarding.  Old fully healed right lower quadrant scar.  NEG Bland's, Rovsing's, Psoas', Obturator's and McBurney's Signs.  BACK: Atraumatic, No CVA tenderness B/L.  EXTREMITIES: FROM.  SKIN: Warm, Dry, No Skin Tears or Rashes. No Jaundice.  VASCULAR: 2+ pulses Prox+Dist & Symm with no delay.  NEUROLOGIC: AAOx3, answered questions appropriately with no focal deficit.    ED Course   Procedures  Labs Reviewed   CBC W/ AUTO DIFFERENTIAL - Abnormal; Notable for the following components:       Result Value     RBC 4.08 (*)     Hemoglobin 9.5 (*)     Hematocrit 33.2 (*)     MCV 81 (*)     MCH 23.3 (*)     MCHC 28.6 (*)     RDW 18.7 (*)     All other components within normal limits   COMPREHENSIVE METABOLIC PANEL - Abnormal; Notable for the following components:    CO2 21 (*)     ALT <5 (*)     All other components within normal limits   ISTAT PROCEDURE - Abnormal; Notable for the following components:    POC BUN 5 (*)     All other components within normal limits   LIPASE   LACTIC ACID, PLASMA          Imaging Results              CT Abdomen Pelvis With IV Contrast NO Oral Contrast (Final result)  Result time 01/06/24 23:42:49      Final result by Reyes Gary MD (01/06/24 23:42:49)                   Impression:      Sequela of pancreatitis with probable small residual peripancreatic collections and/or pancreatic necrosis.  No large peripancreatic fluid collection.  No new peripancreatic collection.  Pancreatic findings are overall similar when compared with 12/29/2023.    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    Wall thickening of the distal stomach with adjacent inflammatory changes, suggestive of gastritis and/or peptic ulcer disease.    Splenomegaly.    Trace abdominopelvic free fluid.    Other findings discussed in the body of the report.      Electronically signed by: Reyes Gary MD  Date:    01/06/2024  Time:    23:42               Narrative:    EXAMINATION:  CT ABDOMEN PELVIS WITH IV CONTRAST    CLINICAL HISTORY:  Abdominal pain, acute, nonlocalized;    TECHNIQUE:  Low dose axial images, sagittal and coronal reformations were obtained from the lung bases to the pubic symphysis following the IV administration of 75 mL of Omnipaque 350 .  Oral contrast was not given.    COMPARISON:  CT, 12/29/2023.    FINDINGS:  Lower Chest:    Left basilar subsegmental atelectasis or scarring.  Heart size is normal.  No pleural or pericardial effusion.  No  consolidation in the lung bases.    Abdomen:    Liver is stable in size and contour.  Gallbladder is decompressed and otherwise unremarkable.  No calcified gallstones.  No intrahepatic biliary ductal dilatation.    Spleen is enlarged and similar to the prior study.  Adrenal glands are stable and negative for acute finding.  Mild peripancreatic inflammatory changes with possible small volume peripancreatic collections versus minimal pancreatic necrosis.  Overall appearance of the pancreas is similar to recent prior exam.  Splenic vein is again not well delineated and there are multiple collateral vessels in the upper abdomen, potentially related to chronic occlusion of the splenic vein.    The kidneys are symmetric.  No hydronephrosis. No asymmetric perinephric inflammatory fat stranding.    No small bowel obstruction.  Wall thickening of the distal stomach with questionable ulceration and perigastric inflammatory changes.  Duodenum is unremarkable.  Appendix is unremarkable.  Mild stool burden in the colon.  Mild wall thickening of the transverse colon which could be reactive, similar to mildly improved compared to prior study.  Trace abdominopelvic free fluid.  No pneumoperitoneum.  Diffuse mesenteric edema.    No bulky retroperitoneal lymphadenopathy.    Abdominal aorta is normal in caliber without significant atherosclerosis.    Portal vein is patent.  There is probable occlusion of the splenic vein with innumerable large collateral vessels in the upper abdomen.  Retroaortic left renal vein, anatomic variant.    Pelvis:    Urinary bladder demonstrates mild symmetric wall prominence.  Rectum and prostate are unremarkable.  Small volume pelvic free fluid.    Bones and soft tissues:    No aggressive osseous lesions.  Extraperitoneal soft tissues are negative for acute finding.  Minimal body wall edema.                                       Medications   HYDROmorphone injection 1 mg (1 mg Intravenous Given 1/6/24  1953)   ondansetron injection 4 mg (4 mg Intravenous Given 1/6/24 1952)   lactated ringers bolus 1,000 mL (0 mLs Intravenous Stopped 1/6/24 2101)   droPERidol injection 1.25 mg (1.25 mg Intravenous Given 1/6/24 2156)   ketorolac injection 9.999 mg (9.999 mg Intravenous Given 1/6/24 2111)   HYDROmorphone injection 1 mg (1 mg Intravenous Given 1/6/24 2113)   iohexoL (OMNIPAQUE 350) injection 75 mL (75 mLs Intravenous Given 1/6/24 2308)   droPERidol injection 1.25 mg (1.25 mg Intravenous Given 1/6/24 2356)     Medical Decision Making  Afebrile, atraumatic and hemodynamically stable despite slightly tachycardic male presents with epigastric pain nausea and vomiting consistent with multiple similar symptoms of pancreatitis.  He is compliant with all his anti-retroviral medications and denies any fever chills, black or bloody stool or lower GI complaints.  Abdomen despite slight tenderness to palpation of epigastrium is benign and nonrigid/no guarding.  Will provide analgesics antiemetics and closely monitor while in the emergency department.  ____________________  Ariel Vela MD, [facility]  Emergency Medicine Staff  7:28 PM 1/6/2024      Amount and/or Complexity of Data Reviewed  Labs: ordered.  Radiology: ordered.    Risk  Prescription drug management.                                        Clinical Impression:  Final diagnoses:  [R10.13] Epigastric abdominal pain  [K86.1] Chronic pancreatitis, unspecified pancreatitis type (Primary)  [G89.4] Chronic pain syndrome          ED Disposition Condition    Discharge Stable          ED Prescriptions       Medication Sig Dispense Start Date End Date Auth. Provider    omeprazole (PRILOSEC) 20 MG capsule Take 1 capsule (20 mg total) by mouth once daily. 30 capsule 1/7/2024 1/6/2025 Roxy Torres MD          Follow-up Information       Follow up With Specialties Details Why Contact Info Additional Information    Pina Jones NP Family Medicine Schedule an appointment  as soon as possible for a visit   [address]  [facility] [address]  [phone]       [facility] PAIN MANAGEMENT Pain Medicine Schedule an appointment as soon as possible for a visit   [address] [facility] [address]  [phone]     [facility] - [facility] Center [facility] 4th Fl Gastroenterology Schedule an appointment as soon as possible for a visit   [address] [facility] [id number] [phone] GI Center & Urology - Main Building, 4th Floor Please park in [facility] and take Atrium elevator          F/U:  Upon completion of my shift, patient continues with no appreciated hemodynamic instability despite persistent pain.  Imaging ordered and further analgesics/antiemetics provided.  Transfer of care to Dr. Torres to F/U labs, imaging, reassessment and ultimate disposition.  ____________________  Ariel Vela MD, [facility]  Emergency Medicine Staff  10:04 PM 1/6/2024       Yossi Vela MD  01/07/24 3963

## 2024-01-07 NOTE — ED TRIAGE NOTES
Tasia Cardona, a 23 y.o. male presents to the ED w/ complaint of abdominal selma. Hx of pancreatitis. Endorses some vomiting today    Adult Physical Assessment  LOC: Tasia Cardona, 23 y.o. male verified via two identifiers.  The patient is awake, alert, oriented and speaking appropriately at this time.  APPEARANCE: Patient resting comfortably and appears to be in no acute distress at this time. Patient is clean and well groomed, patient's clothing is properly fastened.  SKIN:The skin is warm and dry, color consistent with ethnicity, patient has normal skin turgor and moist mucus membranes, skin intact, no breakdown or brusing noted.  MUSCULOSKELETAL: Patient moving all extremities well, no obvious swelling or deformities noted.  RESPIRATORY: Airway is open and patent, respirations are spontaneous, patient has a normal effort and rate, no accessory muscle use noted.  CARDIAC: Patient has a normal rate and rhythm, no periphreal edema noted in any extremity, capillary refill < 3 seconds in all extremities  ABDOMEN: Soft and non tender to palpation, no abdominal distention noted. Bowel sounds present in all four quadrants. Patient reports severe abdominal pain starting today with 9 episodes of emesis. Hx of pancreatitis.   NEUROLOGIC: Eyes open spontaneously, behavior appropriate to situation, follows commands, facial expression symmetrical, bilateral hand grasp equal and even, purposeful motor response noted, normal sensation in all extremities when touched with a finger.      Triage note:  Chief Complaint   Patient presents with    Abdominal Pain     Has pancreatitis , vomiting     Review of patient's allergies indicates:   Allergen Reactions    [place] Swelling    Pecan nut Swelling    Penicillins Hives     Tolerates cephalosporins    Tolerated piperacillin/tazobactam 11/2023    Soy Other (See Comments)    Sulfa (sulfonamide antibiotics) Hives     Past Medical History:   Diagnosis Date    Acute necrotizing  pancreatitis 09/20/2023    Alcohol use disorder 10/05/2023    Asthma     Hepatitis C antibody positive in blood 09/18/2023 9/2023 PCR negative    HIV infection 10/2021    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

## 2024-01-07 NOTE — DISCHARGE INSTRUCTIONS
Diagnosis:  Chronic pain syndrome    Tests today showed:   Labs Reviewed   CBC W/ AUTO DIFFERENTIAL - Abnormal; Notable for the following components:       Result Value    RBC 4.08 (*)     Hemoglobin 9.5 (*)     Hematocrit 33.2 (*)     MCV 81 (*)     MCH 23.3 (*)     MCHC 28.6 (*)     RDW 18.7 (*)     All other components within normal limits   COMPREHENSIVE METABOLIC PANEL - Abnormal; Notable for the following components:    CO2 21 (*)     ALT <5 (*)     All other components within normal limits   ISTAT PROCEDURE - Abnormal; Notable for the following components:    POC BUN 5 (*)     All other components within normal limits   LIPASE   LACTIC ACID, PLASMA   ISTAT CHEM8     CT Abdomen Pelvis With IV Contrast NO Oral Contrast   Final Result      Sequela of pancreatitis with probable small residual peripancreatic collections and/or pancreatic necrosis.  No large peripancreatic fluid collection.  No new peripancreatic collection.  Pancreatic findings are overall similar when compared with 12/29/2023.      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      Wall thickening of the distal stomach with adjacent inflammatory changes, suggestive of gastritis and/or peptic ulcer disease.      Splenomegaly.      Trace abdominopelvic free fluid.      Other findings discussed in the body of the report.         Electronically signed by: Reyes Gary MD   Date:    01/06/2024   Time:    23:42          Treatments you had today:   Medications   aluminum-magnesium hydroxide-simethicone 200-200-20 mg/5 mL suspension 30 mL (30 mLs Oral Not Given 1/6/24 2116)     And   LIDOcaine viscous HCl 2% oral solution 15 mL (15 mLs Oral Not Given 1/6/24 2117)   HYDROmorphone injection 1 mg (1 mg Intravenous Given 1/6/24 1953)   ondansetron injection 4 mg (4 mg Intravenous Given 1/6/24 1952)   lactated ringers bolus 1,000 mL (0 mLs Intravenous Stopped 1/6/24 2101)   droPERidol injection 1.25 mg (1.25  mg Intravenous Given 1/6/24 2156)   ketorolac injection 9.999 mg (9.999 mg Intravenous Given 1/6/24 2111)   HYDROmorphone injection 1 mg (1 mg Intravenous Given 1/6/24 2113)   iohexoL (OMNIPAQUE 350) injection 75 mL (75 mLs Intravenous Given 1/6/24 2308)   droPERidol injection 1.25 mg (1.25 mg Intravenous Given 1/6/24 2356)       Follow-Up Plan:  - Follow-up with primary care doctor within 3 - 5 days  - Additional testing and/or evaluation as directed by your primary doctor    Return to the Emergency Department for symptoms including but not limited to: worsening symptoms, shortness of breath or chest pain, vomiting with inability to hold down fluids, fevers greater than 100.4°F, passing out/fainting/unconsciousness, or other concerning symptoms.

## 2024-01-08 ENCOUNTER — HOSPITAL ENCOUNTER (EMERGENCY)
Facility: HOSPITAL | Age: 24
Discharge: HOME | End: 2024-01-08
Attending: EMERGENCY MEDICINE
Payer: MEDICAID

## 2024-01-08 VITALS
TEMPERATURE: 98 F | BODY MASS INDEX: 20.09 KG/M2 | DIASTOLIC BLOOD PRESSURE: 84 MMHG | OXYGEN SATURATION: 99 % | HEART RATE: 103 BPM | WEIGHT: 140 LBS | RESPIRATION RATE: 18 BRPM | SYSTOLIC BLOOD PRESSURE: 118 MMHG

## 2024-01-08 DIAGNOSIS — R10.9 ABDOMINAL PAIN, UNSPECIFIED ABDOMINAL LOCATION: Primary | ICD-10-CM

## 2024-01-08 LAB
ALBUMIN SERPL BCP-MCNC: 3.6 G/DL (ref 3.5–5.2)
ALP SERPL-CCNC: 100 U/L (ref 55–135)
ALT SERPL W/O P-5'-P-CCNC: 7 U/L (ref 10–44)
ANION GAP SERPL CALC-SCNC: 11 MMOL/L (ref 8–16)
AST SERPL-CCNC: 24 U/L (ref 10–40)
BASOPHILS # BLD AUTO: 0.05 K/UL (ref 0–0.2)
BASOPHILS NFR BLD: 0.7 % (ref 0–1.9)
BILIRUB SERPL-MCNC: 0.3 MG/DL (ref 0.1–1)
BUN SERPL-MCNC: 6 MG/DL (ref 6–20)
CALCIUM SERPL-MCNC: 9 MG/DL (ref 8.7–10.5)
CHLORIDE SERPL-SCNC: 107 MMOL/L (ref 95–110)
CO2 SERPL-SCNC: 21 MMOL/L (ref 23–29)
CREAT SERPL-MCNC: 0.7 MG/DL (ref 0.5–1.4)
DIFFERENTIAL METHOD BLD: ABNORMAL
EOSINOPHIL # BLD AUTO: 0.1 K/UL (ref 0–0.5)
EOSINOPHIL NFR BLD: 1.4 % (ref 0–8)
ERYTHROCYTE [DISTWIDTH] IN BLOOD BY AUTOMATED COUNT: 19 % (ref 11.5–14.5)
EST. GFR  (NO RACE VARIABLE): >60 ML/MIN/1.73 M^2
GLUCOSE SERPL-MCNC: 92 MG/DL (ref 70–110)
HCT VFR BLD AUTO: 34.4 % (ref 40–54)
HGB BLD-MCNC: 9.9 G/DL (ref 14–18)
IMM GRANULOCYTES # BLD AUTO: 0.02 K/UL (ref 0–0.04)
IMM GRANULOCYTES NFR BLD AUTO: 0.3 % (ref 0–0.5)
LIPASE SERPL-CCNC: 49 U/L (ref 4–60)
LYMPHOCYTES # BLD AUTO: 2.6 K/UL (ref 1–4.8)
LYMPHOCYTES NFR BLD: 36.8 % (ref 18–48)
MCH RBC QN AUTO: 23.3 PG (ref 27–31)
MCHC RBC AUTO-ENTMCNC: 28.8 G/DL (ref 32–36)
MCV RBC AUTO: 81 FL (ref 82–98)
MONOCYTES # BLD AUTO: 0.6 K/UL (ref 0.3–1)
MONOCYTES NFR BLD: 9.1 % (ref 4–15)
NEUTROPHILS # BLD AUTO: 3.6 K/UL (ref 1.8–7.7)
NEUTROPHILS NFR BLD: 51.7 % (ref 38–73)
NRBC BLD-RTO: 0 /100 WBC
PLATELET # BLD AUTO: 352 K/UL (ref 150–450)
PMV BLD AUTO: 10.3 FL (ref 9.2–12.9)
POTASSIUM SERPL-SCNC: 5 MMOL/L (ref 3.5–5.1)
PROT SERPL-MCNC: 8 G/DL (ref 6–8.4)
RBC # BLD AUTO: 4.25 M/UL (ref 4.6–6.2)
SODIUM SERPL-SCNC: 139 MMOL/L (ref 136–145)
WBC # BLD AUTO: 7.02 K/UL (ref 3.9–12.7)

## 2024-01-08 PROCEDURE — 96361 HYDRATE IV INFUSION ADD-ON: CPT

## 2024-01-08 PROCEDURE — 83690 ASSAY OF LIPASE: CPT | Performed by: EMERGENCY MEDICINE

## 2024-01-08 PROCEDURE — 85025 COMPLETE CBC W/AUTO DIFF WBC: CPT | Performed by: EMERGENCY MEDICINE

## 2024-01-08 PROCEDURE — 25000003 PHARM REV CODE 250: Performed by: EMERGENCY MEDICINE

## 2024-01-08 PROCEDURE — 80053 COMPREHEN METABOLIC PANEL: CPT | Performed by: EMERGENCY MEDICINE

## 2024-01-08 PROCEDURE — 63600175 PHARM REV CODE 636 W HCPCS: Performed by: EMERGENCY MEDICINE

## 2024-01-08 PROCEDURE — 99284 EMERGENCY DEPT VISIT MOD MDM: CPT

## 2024-01-08 PROCEDURE — 96374 THER/PROPH/DIAG INJ IV PUSH: CPT

## 2024-01-08 RX ORDER — ALUMINUM HYDROXIDE, MAGNESIUM HYDROXIDE, AND SIMETHICONE 1200; 120; 1200 MG/30ML; MG/30ML; MG/30ML
5 SUSPENSION ORAL
Status: COMPLETED | OUTPATIENT
Start: 2024-01-08 | End: 2024-01-08

## 2024-01-08 RX ORDER — DROPERIDOL 2.5 MG/ML
2.5 INJECTION, SOLUTION INTRAMUSCULAR; INTRAVENOUS
Status: COMPLETED | OUTPATIENT
Start: 2024-01-08 | End: 2024-01-08

## 2024-01-08 RX ADMIN — ALUMINUM HYDROXIDE, MAGNESIUM HYDROXIDE, AND SIMETHICONE 5 ML: 200; 200; 20 SUSPENSION ORAL at 07:01

## 2024-01-08 RX ADMIN — DROPERIDOL 2.5 MG: 2.5 INJECTION, SOLUTION INTRAMUSCULAR; INTRAVENOUS at 07:01

## 2024-01-08 RX ADMIN — SODIUM CHLORIDE, POTASSIUM CHLORIDE, SODIUM LACTATE AND CALCIUM CHLORIDE 1000 ML: 600; 310; 30; 20 INJECTION, SOLUTION INTRAVENOUS at 07:01

## 2024-01-08 NOTE — FIRST PROVIDER EVALUATION
"Medical screening examination initiated.  I have conducted a focused provider triage encounter, findings are as follows:    Brief history of present illness:  "my pancreatitis is flaring up".  Numerous recent visits to the ED for his chronic pain    There were no vitals filed for this visit.    Pertinent physical exam:  nontoxic    Brief workup plan:  abd labs    Preliminary workup initiated; this workup will be continued and followed by the physician or advanced practice provider that is assigned to the patient when roomed.  "

## 2024-01-09 NOTE — ED NOTES
Patient states left abd pain , has been seen mult times for same, unable to take Dialudid due to vomiting

## 2024-01-09 NOTE — ED NOTES
Patient identifiers verified and correct for  Mr Cardona  C/C:  ABd pain SEE NN  APPEARANCE: awake and alert in NAD. PAIN  10/10  SKIN: warm, dry and intact. No breakdown or bruising.  MUSCULOSKELETAL: Patient moving all extremities spontaneously, no obvious swelling or deformities noted. Ambulates independently.  RESPIRATORY: Denies shortness of breath.Respirations unlabored.   CARDIAC: Denies CP, 2+ distal pulses; no peripheral edema  ABDOMEN: ABDomen soft, reports nausea, vomiting, pain to left abdomen   : voids spontaneously, denies difficulty  Neurologic: AAO x 4; follows commands equal strength in all extremities; denies numbness/tingling. Denies dizziness  Denies new weaknessx

## 2024-01-09 NOTE — ED PROVIDER NOTES
Encounter Date: 1/8/2024       History     Chief Complaint   Patient presents with    Abdominal Pain     Hx pancreatitis; started today     23-year-old male, history of HIV, hep C, alcohol use disorder, chronic pancreatitis, frequent ED visits for abdominal pain, complaining of abdominal pain, onset today, epigastric, associated with nausea and vomiting.  Patient is seen by an outside pain specialist who prescribes him Lyrica, Dilaudid, and a muscle relaxer.  He says he has not good about taking these prescribed oral pain medications and is not sure how much he is taken today.  Patient has been seen in our ED 4 times in the last week.  He had a CT scan 2 days ago that showed stable peripancreatic collections, gastritis.    The history is provided by the patient.     Review of patient's allergies indicates:   Allergen Reactions    [place] Swelling    Pecan nut Swelling    Penicillins Hives     Tolerates cephalosporins    Tolerated piperacillin/tazobactam 11/2023    Soy Other (See Comments)    Sulfa (sulfonamide antibiotics) Hives     Past Medical History:   Diagnosis Date    Acute necrotizing pancreatitis 09/20/2023    Alcohol use disorder 10/05/2023    Asthma     Hepatitis C antibody positive in blood 09/18/2023 9/2023 PCR negative    HIV infection 10/2021    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     Past Surgical History:   Procedure Laterality Date    ENDOSCOPIC ULTRASOUND OF UPPER GASTROINTESTINAL TRACT N/A 10/23/2023    Procedure: ULTRASOUND, UPPER GI TRACT, ENDOSCOPIC;  Surgeon: Emil Villatoro MD;  Location: [facility] ENDO [address]);  Service: Endoscopy;  Laterality: N/A;    ERCP N/A 10/23/2023    Procedure: ERCP (ENDOSCOPIC RETROGRADE CHOLANGIOPANCREATOGRAPHY);  Surgeon: Emil Villatoro MD;  Location: [facility]  ENDO (2ND FLR);  Service: Endoscopy;  Laterality: N/A;    ESOPHAGOGASTRODUODENOSCOPY N/A 9/18/2023    Procedure: EGD (ESOPHAGOGASTRODUODENOSCOPY);  Surgeon: Kg Cleaning MD;  Location: [facility] (2ND FLR);  Service: Endoscopy;  Laterality: N/A;     Family History   Problem Relation Age of Onset    Pancreatitis Mother     Cancer Mother     Ovarian cancer Mother     No Known Problems Father     Cancer Brother     Breast cancer Maternal Grandmother      Social History     Tobacco Use    Smoking status: Former     Types: Cigarettes    Smokeless tobacco: Never   Substance Use Topics    Alcohol use: Not Currently    Drug use: Never     Review of Systems    Physical Exam     Initial Vitals [01/08/24 1453]   BP Pulse Resp Temp SpO2   127/74 96 16 98.6 °F (37 °C) 100 %      MAP       --         Physical Exam    Nursing note and vitals reviewed.  Constitutional: Vital signs are normal. He appears well-developed and well-nourished. He is not diaphoretic.  Non-toxic appearance. He does not appear ill. No distress.   HENT:   Mouth/Throat: Mucous membranes are normal. Mucous membranes are not dry.   Mucous membranes dry   Eyes: Conjunctivae and lids are normal.   Neck: Neck supple.   Normal range of motion.  Cardiovascular:  Normal rate.           Pulmonary/Chest: No respiratory distress.   Abdominal: Abdomen is soft. He exhibits no distension.   Mild diffuse tenderness There is no rebound and no guarding.   Musculoskeletal:         General: No edema.      Cervical back: Normal range of motion and neck supple.     Neurological: He is alert and oriented to person, place, and time. He has normal strength.   Skin: Skin is dry and intact. There is pallor.   Psychiatric: He has a normal mood and affect. His speech is normal and behavior is normal.         ED Course   Procedures  Labs Reviewed   CBC W/ AUTO DIFFERENTIAL - Abnormal; Notable for the following components:       Result Value    RBC 4.25 (*)     Hemoglobin 9.9 (*)      Hematocrit 34.4 (*)     MCV 81 (*)     MCH 23.3 (*)     MCHC 28.8 (*)     RDW 19.0 (*)     All other components within normal limits   COMPREHENSIVE METABOLIC PANEL - Abnormal; Notable for the following components:    CO2 21 (*)     ALT 7 (*)     All other components within normal limits   LIPASE          Imaging Results    None          Medications   lactated ringers bolus 1,000 mL (0 mLs Intravenous Stopped 1/8/24 2110)   droPERidol injection 2.5 mg (2.5 mg Intravenous Given 1/8/24 1929)   aluminum-magnesium hydroxide-simethicone 200-200-20 mg/5 mL suspension 5 mL (5 mLs Oral Given 1/8/24 1929)     Medical Decision Making  DDx for abdominal pain would include cholecystitis, appendicitis, diverticulitis, pancreatitis, cholangitis, hepatitis, bowel perforation or obstruction, volvulus, gastritis, renal colic, vascular catastrophe like AAA, aortic dissection, or mesenteric ischemia.   Other less dangerous problems such as gastroparesis, cyclic vomiting syndrome, and constipation are also possible.    My highest suspicion at this time is for a chronic abdominal pain syndrome.      -This patient does need emergent laboratory testing: CBC, CMP, lipase  -Medications to be administered: droperidol, IVF, maalox - given that this has become a chronic abd pain syndrome, would like to avoid the use of parenteral opioids  -Emergent imaging is not indicated at this time given overall well appearance and very recent abdominal imaging that looked stable  -Disposition:  Will anticipate discharge.  Patient has an appointment tomorrow with pain management.      Risk  OTC drugs.  Prescription drug management.               ED Course as of 01/08/24 2209 Mon Jan 08, 2024 2051 Pt reassessed, very well-appearing.  Labs reassuring.  Will d/c home [AS]      ED Course User Index  [AS] Roxy Olivares MD                           Clinical Impression:  Final diagnoses:  [R10.9] Abdominal pain, unspecified abdominal location  (Primary)          ED Disposition Condition    Discharge Stable          ED Prescriptions    None       Follow-up Information       Follow up With Specialties Details Why Contact Info    Michael Choi MD Pain Medicine Go in 1 day  [address] [facility] [address]  [phone]               Roxy Olivares MD  01/08/24 0616

## 2024-01-11 ENCOUNTER — HOSPITAL ENCOUNTER (EMERGENCY)
Facility: OTHER | Age: 24
Discharge: HOME | End: 2024-01-11
Attending: EMERGENCY MEDICINE
Payer: MEDICAID

## 2024-01-11 VITALS
WEIGHT: 140 LBS | RESPIRATION RATE: 17 BRPM | BODY MASS INDEX: 20.04 KG/M2 | TEMPERATURE: 98 F | HEART RATE: 92 BPM | HEIGHT: 70 IN | DIASTOLIC BLOOD PRESSURE: 79 MMHG | OXYGEN SATURATION: 97 % | SYSTOLIC BLOOD PRESSURE: 124 MMHG

## 2024-01-11 DIAGNOSIS — G89.29 CHRONIC ABDOMINAL PAIN: Primary | ICD-10-CM

## 2024-01-11 DIAGNOSIS — R10.9 CHRONIC ABDOMINAL PAIN: Primary | ICD-10-CM

## 2024-01-11 LAB
ALBUMIN SERPL BCP-MCNC: 4.3 G/DL (ref 3.5–5.2)
ALP SERPL-CCNC: 103 U/L (ref 55–135)
ALT SERPL W/O P-5'-P-CCNC: 7 U/L (ref 10–44)
ANION GAP SERPL CALC-SCNC: 10 MMOL/L (ref 8–16)
AST SERPL-CCNC: 15 U/L (ref 10–40)
BASOPHILS # BLD AUTO: 0.05 K/UL (ref 0–0.2)
BASOPHILS NFR BLD: 0.7 % (ref 0–1.9)
BILIRUB SERPL-MCNC: 1 MG/DL (ref 0.1–1)
BUN SERPL-MCNC: 9 MG/DL (ref 6–20)
CALCIUM SERPL-MCNC: 9.7 MG/DL (ref 8.7–10.5)
CHLORIDE SERPL-SCNC: 104 MMOL/L (ref 95–110)
CO2 SERPL-SCNC: 24 MMOL/L (ref 23–29)
CREAT SERPL-MCNC: 0.8 MG/DL (ref 0.5–1.4)
DIFFERENTIAL METHOD BLD: ABNORMAL
EOSINOPHIL # BLD AUTO: 0.1 K/UL (ref 0–0.5)
EOSINOPHIL NFR BLD: 1.3 % (ref 0–8)
ERYTHROCYTE [DISTWIDTH] IN BLOOD BY AUTOMATED COUNT: 18.3 % (ref 11.5–14.5)
EST. GFR  (NO RACE VARIABLE): >60 ML/MIN/1.73 M^2
GLUCOSE SERPL-MCNC: 109 MG/DL (ref 70–110)
HCT VFR BLD AUTO: 40.3 % (ref 40–54)
HGB BLD-MCNC: 11.6 G/DL (ref 14–18)
IMM GRANULOCYTES # BLD AUTO: 0.03 K/UL (ref 0–0.04)
IMM GRANULOCYTES NFR BLD AUTO: 0.4 % (ref 0–0.5)
LIPASE SERPL-CCNC: 81 U/L (ref 4–60)
LYMPHOCYTES # BLD AUTO: 2.5 K/UL (ref 1–4.8)
LYMPHOCYTES NFR BLD: 33.4 % (ref 18–48)
MCH RBC QN AUTO: 22.7 PG (ref 27–31)
MCHC RBC AUTO-ENTMCNC: 28.8 G/DL (ref 32–36)
MCV RBC AUTO: 79 FL (ref 82–98)
MONOCYTES # BLD AUTO: 0.7 K/UL (ref 0.3–1)
MONOCYTES NFR BLD: 8.8 % (ref 4–15)
NEUTROPHILS # BLD AUTO: 4.2 K/UL (ref 1.8–7.7)
NEUTROPHILS NFR BLD: 55.4 % (ref 38–73)
NRBC BLD-RTO: 0 /100 WBC
PLATELET # BLD AUTO: 362 K/UL (ref 150–450)
PMV BLD AUTO: 10.1 FL (ref 9.2–12.9)
POTASSIUM SERPL-SCNC: 4.3 MMOL/L (ref 3.5–5.1)
PROT SERPL-MCNC: 9.1 G/DL (ref 6–8.4)
RBC # BLD AUTO: 5.11 M/UL (ref 4.6–6.2)
SODIUM SERPL-SCNC: 138 MMOL/L (ref 136–145)
WBC # BLD AUTO: 7.6 K/UL (ref 3.9–12.7)

## 2024-01-11 PROCEDURE — 80053 COMPREHEN METABOLIC PANEL: CPT | Performed by: NURSE PRACTITIONER

## 2024-01-11 PROCEDURE — 99284 EMERGENCY DEPT VISIT MOD MDM: CPT | Mod: 25

## 2024-01-11 PROCEDURE — 96375 TX/PRO/DX INJ NEW DRUG ADDON: CPT

## 2024-01-11 PROCEDURE — 96374 THER/PROPH/DIAG INJ IV PUSH: CPT

## 2024-01-11 PROCEDURE — 85025 COMPLETE CBC W/AUTO DIFF WBC: CPT | Performed by: NURSE PRACTITIONER

## 2024-01-11 PROCEDURE — 83690 ASSAY OF LIPASE: CPT | Performed by: NURSE PRACTITIONER

## 2024-01-11 PROCEDURE — 36415 COLL VENOUS BLD VENIPUNCTURE: CPT | Performed by: EMERGENCY MEDICINE

## 2024-01-11 PROCEDURE — 25000003 PHARM REV CODE 250: Performed by: EMERGENCY MEDICINE

## 2024-01-11 PROCEDURE — 63600175 PHARM REV CODE 636 W HCPCS: Performed by: NURSE PRACTITIONER

## 2024-01-11 PROCEDURE — 25000003 PHARM REV CODE 250: Performed by: NURSE PRACTITIONER

## 2024-01-11 PROCEDURE — 96361 HYDRATE IV INFUSION ADD-ON: CPT

## 2024-01-11 PROCEDURE — 63600175 PHARM REV CODE 636 W HCPCS: Performed by: EMERGENCY MEDICINE

## 2024-01-11 RX ORDER — ALUMINUM HYDROXIDE, MAGNESIUM HYDROXIDE, AND SIMETHICONE 1200; 120; 1200 MG/30ML; MG/30ML; MG/30ML
30 SUSPENSION ORAL
Status: COMPLETED | OUTPATIENT
Start: 2024-01-11 | End: 2024-01-11

## 2024-01-11 RX ORDER — DICYCLOMINE HYDROCHLORIDE 20 MG/1
20 TABLET ORAL 2 TIMES DAILY
Qty: 20 TABLET | Refills: 0 | Status: SHIPPED | OUTPATIENT
Start: 2024-01-11 | End: 2024-02-10

## 2024-01-11 RX ORDER — ONDANSETRON HYDROCHLORIDE 2 MG/ML
4 INJECTION, SOLUTION INTRAVENOUS
Status: COMPLETED | OUTPATIENT
Start: 2024-01-11 | End: 2024-01-11

## 2024-01-11 RX ORDER — PROMETHAZINE HYDROCHLORIDE 25 MG/1
25 SUPPOSITORY RECTAL EVERY 6 HOURS PRN
Qty: 10 SUPPOSITORY | Refills: 0 | Status: SHIPPED | OUTPATIENT
Start: 2024-01-11 | End: 2024-03-25

## 2024-01-11 RX ORDER — ONDANSETRON 4 MG/1
4 TABLET, ORALLY DISINTEGRATING ORAL EVERY 6 HOURS PRN
Qty: 10 TABLET | Refills: 0 | Status: SHIPPED | OUTPATIENT
Start: 2024-01-11 | End: 2024-01-18

## 2024-01-11 RX ORDER — PROCHLORPERAZINE EDISYLATE 5 MG/ML
5 INJECTION INTRAMUSCULAR; INTRAVENOUS ONCE
Status: DISCONTINUED | OUTPATIENT
Start: 2024-01-11 | End: 2024-01-11

## 2024-01-11 RX ORDER — DROPERIDOL 2.5 MG/ML
1.25 INJECTION, SOLUTION INTRAMUSCULAR; INTRAVENOUS
Status: COMPLETED | OUTPATIENT
Start: 2024-01-11 | End: 2024-01-11

## 2024-01-11 RX ORDER — DICYCLOMINE HYDROCHLORIDE 10 MG/ML
20 INJECTION INTRAMUSCULAR
Status: DISCONTINUED | OUTPATIENT
Start: 2024-01-11 | End: 2024-01-11 | Stop reason: HOSPADM

## 2024-01-11 RX ORDER — DROPERIDOL 2.5 MG/ML
0.62 INJECTION, SOLUTION INTRAMUSCULAR; INTRAVENOUS
Status: DISCONTINUED | OUTPATIENT
Start: 2024-01-11 | End: 2024-01-11

## 2024-01-11 RX ORDER — FAMOTIDINE 10 MG/ML
20 INJECTION INTRAVENOUS
Status: COMPLETED | OUTPATIENT
Start: 2024-01-11 | End: 2024-01-11

## 2024-01-11 RX ADMIN — FAMOTIDINE 20 MG: 10 INJECTION, SOLUTION INTRAVENOUS at 02:01

## 2024-01-11 RX ADMIN — DROPERIDOL 1.25 MG: 2.5 INJECTION, SOLUTION INTRAMUSCULAR; INTRAVENOUS at 02:01

## 2024-01-11 RX ADMIN — SODIUM CHLORIDE 1000 ML: 0.9 INJECTION, SOLUTION INTRAVENOUS at 01:01

## 2024-01-11 RX ADMIN — ONDANSETRON 4 MG: 2 INJECTION INTRAMUSCULAR; INTRAVENOUS at 01:01

## 2024-01-11 RX ADMIN — ALUMINUM HYDROXIDE, MAGNESIUM HYDROXIDE, AND SIMETHICONE 30 ML: 200; 200; 20 SUSPENSION ORAL at 01:01

## 2024-01-11 NOTE — ED TRIAGE NOTES
"Left sided abdominal pain with N/V/D since last night. No fever reported. Last emesis about 30 min ago. States h/o pancreatitis, "feels the same". No previous abdominal surgeries but states he has had several biliary stents placed in past for drainage. Denies ETOH. Presents awake, alert. No distress  "

## 2024-01-11 NOTE — ED PROVIDER NOTES
"SCRIBE #1 NOTE: I, Krystal Goddard, am scribing for, and in the presence of,  Shaq Tierney DO.     Source of History:  Patient.     Chief complaint:  Abdominal Pain (Pt reporting L sided abdominal pain with n/v since last night. Pt states "this feels like a pancreatitis flare up")      HPI:  Tasia Cardona is a 23 y.o. male presenting with vomiting and abdominal pain since last night. He rates the pain 9/10 severity. He states that the pain "feels like a pancreatitis flare-up."    This is the extent to the patients complaints today here in the emergency department.    ROS:   See HPI.    Review of patient's allergies indicates:   Allergen Reactions    [place] Swelling    Pecan nut Swelling    Penicillins Hives     Tolerates cephalosporins    Tolerated piperacillin/tazobactam 11/2023    Soy Other (See Comments)    Sulfa (sulfonamide antibiotics) Hives       PMH:  As per HPI and below:  Past Medical History:   Diagnosis Date    Acute necrotizing pancreatitis 09/20/2023    Alcohol use disorder 10/05/2023    Asthma     Hepatitis C antibody positive in blood 09/18/2023 9/2023 PCR negative    HIV infection 10/2021    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     Past Surgical History:   Procedure Laterality Date    ENDOSCOPIC ULTRASOUND OF UPPER GASTROINTESTINAL TRACT N/A 10/23/2023    Procedure: ULTRASOUND, UPPER GI TRACT, ENDOSCOPIC;  Surgeon: Emil Villatoro MD;  Location: [address]);  Service: Endoscopy;  Laterality: N/A;    ERCP N/A 10/23/2023    Procedure: ERCP (ENDOSCOPIC RETROGRADE CHOLANGIOPANCREATOGRAPHY);  Surgeon: Emil Villatoro MD;  Location: [address]);  Service: Endoscopy;  Laterality: N/A;    ESOPHAGOGASTRODUODENOSCOPY N/A 9/18/2023    Procedure: EGD (ESOPHAGOGASTRODUODENOSCOPY);  " "Surgeon: Kg Cleaning MD;  Location: [facility] ([address] [place] [address]);  Service: Endoscopy;  Laterality: N/A;       Social History     Tobacco Use    Smoking status: Former     Types: Cigarettes    Smokeless tobacco: Never   Substance Use Topics    Alcohol use: Not Currently    Drug use: Never       Physical Exam:    /79 (BP Location: Left arm, Patient Position: Lying)   Pulse 92   Temp 98.2 °F (36.8 °C) (Oral)   Resp 17   Ht 5' 10" (1.778 m)   Wt 63.5 kg (140 lb)   SpO2 97%   BMI 20.09 kg/m²   Nursing note and vital signs reviewed.  Constitutional: No acute distress.  Nontoxic  Cardiovascular: Regular rate and rhythm.  No murmurs. No gallops. No rubs  Respiratory: Clear to auscultation bilaterally.  Good air movement.  No wheezes.  No rhonchi. No rales. No accessory muscle use.  Abdomen/:  Mild tenderness palpation in the epigastric region.  No guarding or rebound.  Negative Bland sign.  No McBurney point tenderness.  Non peritoneal.  Neuro: Alert. No focal deficits.  Skin: No rashes seen.     I decided to obtain the patient's medical records.  Summary of Medical Records:  Reviewing medical records this is the 11th emergency department visit in the last 5 weeks.  Most recently was seen 3 days ago at [facility].  Workups over the last 2 weeks I have included blood work IV fluids as well as parental opiate medication.  Patient has been discharged his last 7 emergency department visits with no acute lab abnormalities that necessitate further workup.    MDM/ Differential Dx:   23-year-old male history of chronic pancreatitis as well as frequent emergency department visits.  Most recent visit was 3 days ago.  Within the last 2 weeks patient has had multiple visits with unremarkable workups.  Although the patient states he is in 9/10 pain objectively he appears to be no more than 1-2 based on the faces pain scale.  I have considered but have a low suspicion for acute pancreatitis.  Recent multiple workups " have revealed no acute abnormalities.  Will give IV fluids as well as a GI cocktail start.  The remainder of his symptoms do not suspect appendicitis or diverticulitis.      ED Course as of 01/11/24 1501   Thu Jan 11, 2024   1412 WBC: 7.60 [SM]   1412 Hemoglobin(!): 11.6 [SM]   1412 Platelet Count: 362 [SM]   1431 Patient was requesting IV Dilaudid.  I went discuss with him that based on his presentation today I do not feel he needs IV Dilaudid.  Again he is very calm and shows no evidence objectively of being in severe distress.  I do feel that there is a drug dependence/drug-seeking behavior that has been created in this patient.  [SM]   1454 Sodium: 138 [SM]   1454 Potassium: 4.3 [SM]   1454 Creatinine: 0.8 [SM]   1455 Lipase(!): 81  Not 3 times normal so not consistent with pancreatitis. [SM]   1455 Based on the patient's history as well as what I see today I do not feel any further workup is indicated.  Again examination of his abdomen is not acute there is no indication for imaging.  Explained to this patient.  I strongly suggested the patient follow up with primary care as well as GI doctor and will place a pain management referral.    I have also explained to the patient the role of the emergency department and that we may not have an ultimate diagnosis or cessation of pain in his visit today. [SM]   1456 No further workup is indicated in the emergency department today.  I updated pt regarding results and I counseled pt regarding supportive care measures.  Diagnosis and treatment plan explained to patient. I have answered all questions and the patient is satisfied with the plan of care. Patient discharged home in stable condition.  [SM]      ED Course User Index  [SM] Shaq Tierney,               Scribe Attestation:   Scribe #1: I performed the above scribed service and the documentation accurately describes the services I performed. I attest to the accuracy of the note.    Physician Attestation for  Scribe: I, Dr Shaq Tierney, reviewed documentation as scribed in my presence, which is both accurate and complete.    Diagnostic Impression:    1. Chronic abdominal pain         ED Disposition Condition    Discharge Stable            ED Prescriptions       Medication Sig Dispense Start Date End Date Auth. Provider    ondansetron (ZOFRAN-ODT) 4 MG TbDL Take 1 tablet (4 mg total) by mouth every 6 (six) hours as needed (nausea vomiting). 10 tablet 1/11/2024 1/18/2024 Shaq Tierney,     promethazine (PHENERGAN) 25 MG suppository Place 1 suppository (25 mg total) rectally every 6 (six) hours as needed for Nausea. 10 suppository 1/11/2024 -- Shaq Tierney,     dicyclomine (BENTYL) 20 mg tablet Take 1 tablet (20 mg total) by mouth 2 (two) times daily. 20 tablet 1/11/2024 2/10/2024 Shaq Tierney, DO          Follow-up Information       Follow up With Specialties Details Why Contact Info    Pina Jones, NP Family Medicine Schedule an appointment as soon as possible for a visit in 1 week  [address]  [facility] [address]  [phone]               Shaq Tierney,   01/11/24 1504

## 2024-01-11 NOTE — FIRST PROVIDER EVALUATION
" Emergency Department TeleTriage Encounter Note      CHIEF COMPLAINT    Chief Complaint   Patient presents with    Abdominal Pain     Pt reporting L sided abdominal pain with n/v since last night. Pt states "this feels like a pancreatitis flare up"       VITAL SIGNS   Initial Vitals [01/11/24 1208]   BP Pulse Resp Temp SpO2   (!) 134/93 99 16 98.6 °F (37 °C) 99 %      MAP       --            ALLERGIES    Review of patient's allergies indicates:   Allergen Reactions    [place] Swelling    Pecan nut Swelling    Penicillins Hives     Tolerates cephalosporins    Tolerated piperacillin/tazobactam 11/2023    Soy Other (See Comments)    Sulfa (sulfonamide antibiotics) Hives       PROVIDER TRIAGE NOTE  This is a teletriage evaluation of a 23 y.o. male presenting to the ED complaining of abd pain with n/v. States pmhx of pancreatitis and this feels the same.     Alert, sitting upright, no distress.     Initial orders will be placed and care will be transferred to an alternate provider when patient is roomed for a full evaluation. Any additional orders and the final disposition will be determined by that provider.         ORDERS  Labs Reviewed   CBC W/ AUTO DIFFERENTIAL   COMPREHENSIVE METABOLIC PANEL   LIPASE   URINALYSIS, REFLEX TO URINE CULTURE       ED Orders (720h ago, onward)      Start Ordered     Status Ordering Provider    01/11/24 1230 01/11/24 1215  ondansetron injection 4 mg  ED 1 Time         Ordered PABLO RICK N.    01/11/24 1215 01/11/24 1214  Vital signs  Every 2 hours         Ordered PABLO RICK N.    01/11/24 1215 01/11/24 1214  sodium chloride 0.9% bolus 1,000 mL 1,000 mL  Once         Ordered PABLO RICK N.    01/11/24 1214 01/11/24 1214  Diet NPO  Diet effective now         Ordered PABLO RICK N.    01/11/24 1214 01/11/24 1214  Insert peripheral IV  Once         Ordered PABLO RICK N.    01/11/24 1214 01/11/24 1214  CBC W/ AUTO DIFFERENTIAL  STAT      "    Ordered MERRYCAROLALEXAZAC PABLO N.    01/11/24 1214 01/11/24 1214  Comp. Metabolic Panel  STAT         Ordered MERRYMARCELL PABLO N.    01/11/24 1214 01/11/24 1214  POCT Venous Blood Gas (Creatinine Only)  Once        Comments: This test should be used for VBGs.  If using this order for other tests (K, creatinine, HCT, PT/INR, lactate etc)  ONLY do so in the case of an emergency or rapid response.Notify Physician if: see parameters below.      Ordered MERRYMARCELL PABLO N.    01/11/24 1214 01/11/24 1214  Lipase  STAT         Ordered MERRYLESLYEDEBBIEKRISTIN PABLO N.    01/11/24 1214 01/11/24 1214  Urinalysis, Reflex to Urine Culture Urine, Clean Catch  STAT         Ordered PABLO RICK N.              Virtual Visit Note: The provider triage portion of this emergency department evaluation and documentation was performed via [employer], a HIPAA-compliant telemedicine application, in concert with a tele-presenter in the room. A face to face patient evaluation with one of my colleagues will occur once the patient is placed in an emergency department room.      DISCLAIMER: This note was prepared with [employer] voice recognition transcription software. Garbled syntax, mangled pronouns, and other bizarre constructions may be attributed to that software system.

## 2024-01-12 ENCOUNTER — TELEPHONE (OUTPATIENT)
Dept: ORTHOPEDICS | Facility: CLINIC | Age: 24
End: 2024-01-12

## 2024-01-17 ENCOUNTER — TELEPHONE (OUTPATIENT)
Dept: ORTHOPEDICS | Facility: CLINIC | Age: 24
End: 2024-01-17

## 2024-01-18 NOTE — ASSESSMENT & PLAN NOTE
"This patient in known to have HIV+ status (Have detected HIV PCR but never CD4 <200 or AIDS defining illness). Labs reviewed- No results found for: "CD4", No results found for: "HIVDNAPCR". Patient is on HAART. Will continue. Continue to monitor routine labs.   Lab Results   Component Value Date    ABSOLUTECD4 1080 10/12/2023       - Continue home Biktarvy  - We will not consult Infectious disease at this time. Other HIV-associated diseases are not present.    CD4 count is in a very good range  " Self Administrated

## 2024-01-29 PROBLEM — A41.9 SEPSIS: Status: RESOLVED | Noted: 2023-10-30 | Resolved: 2024-01-29

## 2024-02-05 PROBLEM — J96.01 ACUTE HYPOXEMIC RESPIRATORY FAILURE: Status: RESOLVED | Noted: 2023-11-05 | Resolved: 2024-02-05

## 2024-02-12 ENCOUNTER — HOSPITAL ENCOUNTER (EMERGENCY)
Facility: HOSPITAL | Age: 24
Discharge: HOME | End: 2024-02-12
Attending: EMERGENCY MEDICINE
Payer: MEDICAID

## 2024-02-12 VITALS
SYSTOLIC BLOOD PRESSURE: 137 MMHG | TEMPERATURE: 98 F | HEIGHT: 70 IN | DIASTOLIC BLOOD PRESSURE: 81 MMHG | RESPIRATION RATE: 21 BRPM | HEART RATE: 93 BPM | BODY MASS INDEX: 20.04 KG/M2 | OXYGEN SATURATION: 99 % | WEIGHT: 140 LBS

## 2024-02-12 DIAGNOSIS — R10.9 CHRONIC ABDOMINAL PAIN: Primary | ICD-10-CM

## 2024-02-12 DIAGNOSIS — G89.29 CHRONIC ABDOMINAL PAIN: Primary | ICD-10-CM

## 2024-02-12 LAB
ALBUMIN SERPL BCP-MCNC: 3.9 G/DL (ref 3.5–5.2)
ALP SERPL-CCNC: 114 U/L (ref 55–135)
ALT SERPL W/O P-5'-P-CCNC: 9 U/L (ref 10–44)
ANION GAP SERPL CALC-SCNC: 9 MMOL/L (ref 8–16)
AST SERPL-CCNC: 14 U/L (ref 10–40)
BASOPHILS # BLD AUTO: 0.03 K/UL (ref 0–0.2)
BASOPHILS NFR BLD: 0.3 % (ref 0–1.9)
BILIRUB SERPL-MCNC: 1.5 MG/DL (ref 0.1–1)
BUN SERPL-MCNC: 10 MG/DL (ref 6–20)
CALCIUM SERPL-MCNC: 9.3 MG/DL (ref 8.7–10.5)
CHLORIDE SERPL-SCNC: 105 MMOL/L (ref 95–110)
CO2 SERPL-SCNC: 21 MMOL/L (ref 23–29)
CREAT SERPL-MCNC: 0.7 MG/DL (ref 0.5–1.4)
DIFFERENTIAL METHOD BLD: ABNORMAL
EOSINOPHIL # BLD AUTO: 0.1 K/UL (ref 0–0.5)
EOSINOPHIL NFR BLD: 0.8 % (ref 0–8)
ERYTHROCYTE [DISTWIDTH] IN BLOOD BY AUTOMATED COUNT: 16.7 % (ref 11.5–14.5)
EST. GFR  (NO RACE VARIABLE): >60 ML/MIN/1.73 M^2
GLUCOSE SERPL-MCNC: 102 MG/DL (ref 70–110)
HCT VFR BLD AUTO: 39.5 % (ref 40–54)
HGB BLD-MCNC: 11.4 G/DL (ref 14–18)
IMM GRANULOCYTES # BLD AUTO: 0.02 K/UL (ref 0–0.04)
IMM GRANULOCYTES NFR BLD AUTO: 0.2 % (ref 0–0.5)
LIPASE SERPL-CCNC: 53 U/L (ref 4–60)
LYMPHOCYTES # BLD AUTO: 2 K/UL (ref 1–4.8)
LYMPHOCYTES NFR BLD: 22.8 % (ref 18–48)
MCH RBC QN AUTO: 22.8 PG (ref 27–31)
MCHC RBC AUTO-ENTMCNC: 28.9 G/DL (ref 32–36)
MCV RBC AUTO: 79 FL (ref 82–98)
MONOCYTES # BLD AUTO: 1.1 K/UL (ref 0.3–1)
MONOCYTES NFR BLD: 12.1 % (ref 4–15)
NEUTROPHILS # BLD AUTO: 5.6 K/UL (ref 1.8–7.7)
NEUTROPHILS NFR BLD: 63.8 % (ref 38–73)
NRBC BLD-RTO: 0 /100 WBC
OHS QRS DURATION: 82 MS
OHS QTC CALCULATION: 432 MS
PLATELET # BLD AUTO: 291 K/UL (ref 150–450)
PMV BLD AUTO: 9.2 FL (ref 9.2–12.9)
POTASSIUM SERPL-SCNC: 3.8 MMOL/L (ref 3.5–5.1)
PROT SERPL-MCNC: 7.5 G/DL (ref 6–8.4)
RBC # BLD AUTO: 5 M/UL (ref 4.6–6.2)
SODIUM SERPL-SCNC: 135 MMOL/L (ref 136–145)
WBC # BLD AUTO: 8.7 K/UL (ref 3.9–12.7)

## 2024-02-12 PROCEDURE — 63600175 PHARM REV CODE 636 W HCPCS: Performed by: EMERGENCY MEDICINE

## 2024-02-12 PROCEDURE — 83690 ASSAY OF LIPASE: CPT

## 2024-02-12 PROCEDURE — 96374 THER/PROPH/DIAG INJ IV PUSH: CPT

## 2024-02-12 PROCEDURE — 85025 COMPLETE CBC W/AUTO DIFF WBC: CPT

## 2024-02-12 PROCEDURE — 96361 HYDRATE IV INFUSION ADD-ON: CPT

## 2024-02-12 PROCEDURE — 63600175 PHARM REV CODE 636 W HCPCS

## 2024-02-12 PROCEDURE — 99284 EMERGENCY DEPT VISIT MOD MDM: CPT

## 2024-02-12 PROCEDURE — 80053 COMPREHEN METABOLIC PANEL: CPT

## 2024-02-12 PROCEDURE — 25000003 PHARM REV CODE 250

## 2024-02-12 PROCEDURE — 96375 TX/PRO/DX INJ NEW DRUG ADDON: CPT

## 2024-02-12 RX ORDER — MORPHINE SULFATE 2 MG/ML
6 INJECTION, SOLUTION INTRAMUSCULAR; INTRAVENOUS
Status: DISCONTINUED | OUTPATIENT
Start: 2024-02-12 | End: 2024-02-12

## 2024-02-12 RX ORDER — ALUMINUM HYDROXIDE, MAGNESIUM HYDROXIDE, AND SIMETHICONE 1200; 120; 1200 MG/30ML; MG/30ML; MG/30ML
30 SUSPENSION ORAL
Status: COMPLETED | OUTPATIENT
Start: 2024-02-12 | End: 2024-02-12

## 2024-02-12 RX ORDER — DROPERIDOL 2.5 MG/ML
1.25 INJECTION, SOLUTION INTRAMUSCULAR; INTRAVENOUS
Status: COMPLETED | OUTPATIENT
Start: 2024-02-12 | End: 2024-02-12

## 2024-02-12 RX ORDER — KETOROLAC TROMETHAMINE 30 MG/ML
10 INJECTION, SOLUTION INTRAMUSCULAR; INTRAVENOUS
Status: COMPLETED | OUTPATIENT
Start: 2024-02-12 | End: 2024-02-12

## 2024-02-12 RX ADMIN — DROPERIDOL 1.25 MG: 2.5 INJECTION, SOLUTION INTRAMUSCULAR; INTRAVENOUS at 11:02

## 2024-02-12 RX ADMIN — KETOROLAC TROMETHAMINE 10 MG: 30 INJECTION, SOLUTION INTRAMUSCULAR; INTRAVENOUS at 02:02

## 2024-02-12 RX ADMIN — ALUMINUM HYDROXIDE, MAGNESIUM HYDROXIDE, AND SIMETHICONE 30 ML: 200; 200; 20 SUSPENSION ORAL at 12:02

## 2024-02-12 RX ADMIN — SODIUM CHLORIDE, POTASSIUM CHLORIDE, SODIUM LACTATE AND CALCIUM CHLORIDE 1000 ML: 600; 310; 30; 20 INJECTION, SOLUTION INTRAVENOUS at 02:02

## 2024-02-12 NOTE — DISCHARGE INSTRUCTIONS
Follow up with Pain management and GI physicians for close monitoring of chronic abdominal pain. Return to ED if you have fever, nausea, vomiting.

## 2024-02-12 NOTE — ED NOTES
..I-STAT Chem-8+ Results:   Value Reference Range   Sodium 136 136-145 mmol/L   Potassium  3.7 3.5-5.1 mmol/L   Chloride 104  mmol/L   Ionized Calcium 1.10 1.06-1.42 mmol/L   CO2 (measured) 21 23-29 mmol/L   Glucose 103  mg/dL   BUN 9 6-30 mg/dL   Creatinine ,..2 0.5-1.4 mg/dL   Hematocrit 38 36-54%

## 2024-02-12 NOTE — ED PROVIDER NOTES
Encounter Date: 2/12/2024    24 yo male with HIV on Biktarvy, PMH of brandy-chauhan tear, polycythemia vera, chronic pancreatitis, asthma, EtOH use disorder, reports no EtOH usage for the past 2 months, and opioid use disorder presenting with severe left sided abdominal pain that does not improve with home medications. Reports that he had multiple episodes of hematemesis since yesterday evening. He has been unable to eat or drink anything for the last 2 days. Denies any new diet changes, alcohol use, recent illnesses. Reports that he has been compliant with medicine regimen and has not missed any doses. In ED, 1 L bolus LR, CBC 8.7 HH 11/39, Lipase 53. Received droperidol 1.25, Maalox 30 and Ketorolac 10. Stable for discharge with chronic abdominal pain, no evidence of acute pancreatitis. Will require follow up with PCP, GI and pain management doctors for close monitoring of abdominal pain.       History     Chief Complaint   Patient presents with    Hematemesis     States 8 episodes of bright red vomitus, hx pancreatitis- states hx of esophageal tear     HPI  Review of patient's allergies indicates:   Allergen Reactions    Cleves Swelling    Pecan nut Swelling    Penicillins Hives     Tolerates cephalosporins    Tolerated piperacillin/tazobactam 11/2023    Soy Other (See Comments)    Sulfa (sulfonamide antibiotics) Hives     Past Medical History:   Diagnosis Date    Acute necrotizing pancreatitis 09/20/2023    Alcohol use disorder 10/05/2023    Asthma     Hepatitis C antibody positive in blood 09/18/2023 9/2023 PCR negative    HIV infection 10/2021    Brandy-Chauhan tear 09/18/2023    Normocytic anemia 09/18/2023    Pancreatic pseudocyst/cyst 10/24/2023    Polycythemia vera 11/28/2021    Receives phlebotomy if Hct>45    Positive CMV IgG serology 09/21/2023    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     Past Surgical History:   Procedure Laterality Date    ENDOSCOPIC ULTRASOUND OF UPPER GASTROINTESTINAL TRACT N/A  10/23/2023    Procedure: ULTRASOUND, UPPER GI TRACT, ENDOSCOPIC;  Surgeon: Emil Villatoro MD;  Location: [facility] ENDO (2ND FLR);  Service: Endoscopy;  Laterality: N/A;    ERCP N/A 10/23/2023    Procedure: ERCP (ENDOSCOPIC RETROGRADE CHOLANGIOPANCREATOGRAPHY);  Surgeon: Emil Villatoro MD;  Location: [facility] ENDO (2ND FLR);  Service: Endoscopy;  Laterality: N/A;    ESOPHAGOGASTRODUODENOSCOPY N/A 9/18/2023    Procedure: EGD (ESOPHAGOGASTRODUODENOSCOPY);  Surgeon: Kg Cleaning MD;  Location: [facility] ENDO (2ND FLR);  Service: Endoscopy;  Laterality: N/A;     Family History   Problem Relation Age of Onset    Pancreatitis Mother     Cancer Mother     Ovarian cancer Mother     No Known Problems Father     Cancer Brother     Breast cancer Maternal Grandmother      Social History     Tobacco Use    Smoking status: Former     Types: Cigarettes    Smokeless tobacco: Never   Substance Use Topics    Alcohol use: Not Currently    Drug use: Never     Review of Systems    Physical Exam     Initial Vitals [02/12/24 0940]   BP Pulse Resp Temp SpO2   121/81 (!) 112 (!) 22 98.3 °F (36.8 °C) 99 %      MAP       --         Physical Exam    Nursing note and vitals reviewed.  HENT:   Head: Normocephalic and atraumatic.   Eyes: Conjunctivae are normal. Right eye exhibits no discharge. Left eye exhibits no discharge.   Neck: Neck supple. No thyromegaly present. No tracheal deviation present.   Normal range of motion.  Cardiovascular:  Regular rhythm.           Tachycardic   Pulmonary/Chest: Breath sounds normal. No stridor. No respiratory distress. He has no wheezes. He exhibits no tenderness.   Abdominal: He exhibits no distension and no mass. There is abdominal tenderness.   LUQ and LLQ TTP There is guarding. There is no rebound.   Musculoskeletal:         General: No tenderness or edema.      Cervical back: Normal range of motion and neck supple.     Lymphadenopathy:     He has no cervical adenopathy.   Neurological: He is oriented to person,  place, and time.   Skin: Skin is warm. Capillary refill takes less than 2 seconds. No erythema.   Psychiatric: He has a normal mood and affect. Thought content normal.         ED Course   Procedures  Labs Reviewed   CBC W/ AUTO DIFFERENTIAL - Abnormal; Notable for the following components:       Result Value    Hemoglobin 11.4 (*)     Hematocrit 39.5 (*)     MCV 79 (*)     MCH 22.8 (*)     MCHC 28.9 (*)     RDW 16.7 (*)     Mono # 1.1 (*)     All other components within normal limits   COMPREHENSIVE METABOLIC PANEL - Abnormal; Notable for the following components:    Sodium 135 (*)     CO2 21 (*)     Total Bilirubin 1.5 (*)     ALT 9 (*)     All other components within normal limits   LIPASE          Imaging Results    None          Medications   droPERidol injection 1.25 mg (1.25 mg Intravenous Given 2/12/24 1114)   sodium chloride 0.9% bolus 1,000 mL 1,000 mL (0 mLs Intravenous Stopped 2/12/24 1224)   aluminum-magnesium hydroxide-simethicone 200-200-20 mg/5 mL suspension 30 mL (30 mLs Oral Given 2/12/24 1224)   ketorolac injection 9.999 mg (9.999 mg Intravenous Given 2/12/24 1415)   lactated ringers bolus 1,000 mL (0 mLs Intravenous Stopped 2/12/24 1519)     Medical Decision Making  24 yo male with HIV on Biktarvy, PMH of brandy-hitchcock tear, polycythemia vera, chronic pancreatitis, asthma, EtOH use disorder, recently stopped using alcohol last year and opioid use disorder presenting with severe left sided abdominal pain that does not improve with home medications. Reports that he had multiple episodes of hematemesis since yesterday evening.     Differential Diagnosis: Acute exacerbation of pancreatitis, pancreatic pseudocyst, bleeding gastric ulcer, acute gastritis     Amount and/or Complexity of Data Reviewed  Labs: ordered. Decision-making details documented in ED Course.     Details: Lipase, CBC, CMP all WNL    Risk  OTC drugs.  Prescription drug management.              Attending Attestation:   Physician  Attestation Statement for Resident:  As the supervising MD   Physician Attestation Statement: I have personally seen and examined this patient.   I agree with the above history.  -:   As the supervising MD I agree with the above PE.     As the supervising MD I agree with the above treatment, course, plan, and disposition.   -: Chronic, recurrent abdominal pain in this patient with a history of pancreatitis.  He also notes Corinne-Chauhan tear in the past and states that he had some redness in his vomit today.  No vomiting in the ED, tolerating PO.  H and H stable.  No evidence of pancreatitis on labs.  I think this is a flare of his chronic abdominal pain.  We will avoid opiates given his chronic pain.  Recommend follow-up with PCP.                   ED Course as of 02/13/24 0824 Mon Feb 12, 2024   1153 WBC: 8.70 [DC]   1153 Hemoglobin(!): 11.4 [DC]   1153 Platelet Count: 291 [DC]   1153 Creatinine: 0.7 [DC]   1153 Lipase: 53 [DC]   1336 Tolerating PO [DC]      ED Course User Index  [DC] Srini Rick MD           Clinical Impression:  Final diagnoses:  [R10.9, G89.29] Chronic abdominal pain (Primary)          ED Disposition Condition    Discharge Stable          ED Prescriptions    None       Follow-up Information       Follow up With Specialties Details Why Contact Info    iPna Jones NP Family Medicine Schedule an appointment as soon as possible for a visit in 1 week  [address] [facility] [address]  [phone]      [facility] - Emergency Dept Emergency Medicine  If symptoms worsen [address] [facility] [id number] [phone]             Vinicio Marte MD  Resident  02/12/24 1447       Srini Rick MD  02/13/24 0036

## 2024-02-14 LAB
BUN SERPL-MCNC: 9 MG/DL (ref 6–30)
CHLORIDE SERPL-SCNC: 104 MMOL/L (ref 95–110)
CREAT SERPL-MCNC: <0.2 MG/DL (ref 0.5–1.4)
GLUCOSE SERPL-MCNC: 103 MG/DL (ref 70–110)
HCT VFR BLD CALC: 38 %PCV (ref 36–54)
POC IONIZED CALCIUM: 1.1 MMOL/L (ref 1.06–1.42)
POC TCO2 (MEASURED): 21 MMOL/L (ref 23–27)
POTASSIUM BLD-SCNC: 3.7 MMOL/L (ref 3.5–5.1)
SAMPLE: ABNORMAL
SODIUM BLD-SCNC: 136 MMOL/L (ref 136–145)

## 2024-02-15 NOTE — PROGRESS NOTES
3rd attempt at initial enrollment . No answer. V/m left. ED Navigator to close encounter at this time.

## 2024-03-04 ENCOUNTER — HOSPITAL ENCOUNTER (INPATIENT)
Facility: HOSPITAL | Age: 24
LOS: 6 days | Discharge: HOME | DRG: 439 | End: 2024-03-10
Attending: HOSPITALIST | Admitting: HOSPITALIST
Payer: MEDICAID

## 2024-03-04 DIAGNOSIS — K86.1 ACUTE ON CHRONIC PANCREATITIS: ICD-10-CM

## 2024-03-04 DIAGNOSIS — R07.9 CHEST PAIN: ICD-10-CM

## 2024-03-04 DIAGNOSIS — B20 HIV INFECTION, UNSPECIFIED SYMPTOM STATUS: ICD-10-CM

## 2024-03-04 DIAGNOSIS — K85.90 ACUTE ON CHRONIC PANCREATITIS: ICD-10-CM

## 2024-03-04 DIAGNOSIS — R55 SYNCOPE: ICD-10-CM
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
## 2024-03-04 DIAGNOSIS — R07.9 CHEST PAIN, UNSPECIFIED TYPE: ICD-10-CM

## 2024-03-04 DIAGNOSIS — R55 SYNCOPE, UNSPECIFIED SYNCOPE TYPE: ICD-10-CM

## 2024-03-04 DIAGNOSIS — K22.6 MALLORY-WEISS TEAR: ICD-10-CM

## 2024-03-04 DIAGNOSIS — K85.20 ALCOHOL-INDUCED ACUTE PANCREATITIS, UNSPECIFIED COMPLICATION STATUS: Primary | ICD-10-CM

## 2024-03-04 LAB
ALBUMIN SERPL BCP-MCNC: 4.1 G/DL (ref 3.5–5.2)
ALP SERPL-CCNC: 136 U/L (ref 55–135)
ALT SERPL W/O P-5'-P-CCNC: 14 U/L (ref 10–44)
ANION GAP SERPL CALC-SCNC: 18 MMOL/L (ref 8–16)
AST SERPL-CCNC: 59 U/L (ref 10–40)
BASOPHILS # BLD AUTO: 0.04 K/UL (ref 0–0.2)
BASOPHILS NFR BLD: 0.4 % (ref 0–1.9)
BILIRUB SERPL-MCNC: 0.4 MG/DL (ref 0.1–1)
BUN SERPL-MCNC: 8 MG/DL (ref 6–30)
BUN SERPL-MCNC: 9 MG/DL (ref 6–20)
CALCIUM SERPL-MCNC: 9.3 MG/DL (ref 8.7–10.5)
CHLORIDE SERPL-SCNC: 101 MMOL/L (ref 95–110)
CHLORIDE SERPL-SCNC: 102 MMOL/L (ref 95–110)
CO2 SERPL-SCNC: 17 MMOL/L (ref 23–29)
CREAT SERPL-MCNC: 0.7 MG/DL (ref 0.5–1.4)
CREAT SERPL-MCNC: 0.7 MG/DL (ref 0.5–1.4)
DIFFERENTIAL METHOD BLD: ABNORMAL
EOSINOPHIL # BLD AUTO: 0 K/UL (ref 0–0.5)
EOSINOPHIL NFR BLD: 0.1 % (ref 0–8)
ERYTHROCYTE [DISTWIDTH] IN BLOOD BY AUTOMATED COUNT: 17 % (ref 11.5–14.5)
EST. GFR  (NO RACE VARIABLE): >60 ML/MIN/1.73 M^2
ETHANOL SERPL-MCNC: 27 MG/DL
GLUCOSE SERPL-MCNC: 80 MG/DL (ref 70–110)
GLUCOSE SERPL-MCNC: 84 MG/DL (ref 70–110)
HCT VFR BLD AUTO: 36.9 % (ref 40–54)
HCT VFR BLD CALC: 39 %PCV (ref 36–54)
HGB BLD-MCNC: 11 G/DL (ref 14–18)
IMM GRANULOCYTES # BLD AUTO: 0.03 K/UL (ref 0–0.04)
IMM GRANULOCYTES NFR BLD AUTO: 0.3 % (ref 0–0.5)
LACTATE SERPL-SCNC: 0.7 MMOL/L (ref 0.5–2.2)
LACTATE SERPL-SCNC: 2.3 MMOL/L (ref 0.5–2.2)
LIPASE SERPL-CCNC: 31 U/L (ref 4–60)
LYMPHOCYTES # BLD AUTO: 1.9 K/UL (ref 1–4.8)
LYMPHOCYTES NFR BLD: 19.1 % (ref 18–48)
MAGNESIUM SERPL-MCNC: 2.1 MG/DL (ref 1.6–2.6)
MCH RBC QN AUTO: 22.6 PG (ref 27–31)
MCHC RBC AUTO-ENTMCNC: 29.8 G/DL (ref 32–36)
MCV RBC AUTO: 76 FL (ref 82–98)
MONOCYTES # BLD AUTO: 1.1 K/UL (ref 0.3–1)
MONOCYTES NFR BLD: 10.4 % (ref 4–15)
NEUTROPHILS # BLD AUTO: 7.1 K/UL (ref 1.8–7.7)
NEUTROPHILS NFR BLD: 69.7 % (ref 38–73)
NRBC BLD-RTO: 0 /100 WBC
OHS QRS DURATION: 74 MS
OHS QTC CALCULATION: 445 MS
PLATELET # BLD AUTO: 322 K/UL (ref 150–450)
PMV BLD AUTO: 9.1 FL (ref 9.2–12.9)
POC IONIZED CALCIUM: 1.08 MMOL/L (ref 1.06–1.42)
POC TCO2 (MEASURED): 18 MMOL/L (ref 23–29)
POTASSIUM BLD-SCNC: 3.3 MMOL/L (ref 3.5–5.1)
POTASSIUM SERPL-SCNC: 3.5 MMOL/L (ref 3.5–5.1)
PROT SERPL-MCNC: 8.2 G/DL (ref 6–8.4)
RBC # BLD AUTO: 4.86 M/UL (ref 4.6–6.2)
SAMPLE: ABNORMAL
SODIUM BLD-SCNC: 135 MMOL/L (ref 136–145)
SODIUM SERPL-SCNC: 137 MMOL/L (ref 136–145)
WBC # BLD AUTO: 10.14 K/UL (ref 3.9–12.7)

## 2024-03-04 PROCEDURE — 25000003 PHARM REV CODE 250: Performed by: NURSE PRACTITIONER

## 2024-03-04 PROCEDURE — 96375 TX/PRO/DX INJ NEW DRUG ADDON: CPT

## 2024-03-04 PROCEDURE — 96361 HYDRATE IV INFUSION ADD-ON: CPT

## 2024-03-04 PROCEDURE — 80321 ALCOHOLS BIOMARKERS 1OR 2: CPT | Performed by: EMERGENCY MEDICINE

## 2024-03-04 PROCEDURE — 80053 COMPREHEN METABOLIC PANEL: CPT | Performed by: EMERGENCY MEDICINE

## 2024-03-04 PROCEDURE — 82077 ASSAY SPEC XCP UR&BREATH IA: CPT | Performed by: EMERGENCY MEDICINE

## 2024-03-04 PROCEDURE — 99285 EMERGENCY DEPT VISIT HI MDM: CPT | Mod: 25

## 2024-03-04 PROCEDURE — 63600175 PHARM REV CODE 636 W HCPCS

## 2024-03-04 PROCEDURE — 12000002 HC ACUTE/MED SURGE SEMI-PRIVATE ROOM

## 2024-03-04 PROCEDURE — 93010 ELECTROCARDIOGRAM REPORT: CPT | Mod: ,,, | Performed by: INTERNAL MEDICINE

## 2024-03-04 PROCEDURE — 63600175 PHARM REV CODE 636 W HCPCS: Performed by: NURSE PRACTITIONER

## 2024-03-04 PROCEDURE — 83605 ASSAY OF LACTIC ACID: CPT | Mod: 91 | Performed by: EMERGENCY MEDICINE

## 2024-03-04 PROCEDURE — 83690 ASSAY OF LIPASE: CPT | Performed by: EMERGENCY MEDICINE

## 2024-03-04 PROCEDURE — 25000003 PHARM REV CODE 250

## 2024-03-04 PROCEDURE — 25500020 PHARM REV CODE 255: Performed by: HOSPITALIST

## 2024-03-04 PROCEDURE — 83605 ASSAY OF LACTIC ACID: CPT

## 2024-03-04 PROCEDURE — 93005 ELECTROCARDIOGRAM TRACING: CPT

## 2024-03-04 PROCEDURE — 83735 ASSAY OF MAGNESIUM: CPT | Performed by: EMERGENCY MEDICINE

## 2024-03-04 PROCEDURE — 85025 COMPLETE CBC W/AUTO DIFF WBC: CPT | Performed by: EMERGENCY MEDICINE

## 2024-03-04 PROCEDURE — 80047 BASIC METABLC PNL IONIZED CA: CPT

## 2024-03-04 PROCEDURE — 96376 TX/PRO/DX INJ SAME DRUG ADON: CPT

## 2024-03-04 PROCEDURE — 96374 THER/PROPH/DIAG INJ IV PUSH: CPT

## 2024-03-04 RX ORDER — GLUCAGON 1 MG
1 KIT INJECTION
Status: DISCONTINUED | OUTPATIENT
Start: 2024-03-04 | End: 2024-03-10 | Stop reason: HOSPADM

## 2024-03-04 RX ORDER — FOLIC ACID 1 MG/1
1 TABLET ORAL DAILY
Status: DISCONTINUED | OUTPATIENT
Start: 2024-03-05 | End: 2024-03-10 | Stop reason: HOSPADM

## 2024-03-04 RX ORDER — PREGABALIN 75 MG/1
75 CAPSULE ORAL 2 TIMES DAILY
Status: DISCONTINUED | OUTPATIENT
Start: 2024-03-04 | End: 2024-03-10 | Stop reason: HOSPADM

## 2024-03-04 RX ORDER — DROPERIDOL 2.5 MG/ML
1.25 INJECTION, SOLUTION INTRAMUSCULAR; INTRAVENOUS
Status: COMPLETED | OUTPATIENT
Start: 2024-03-04 | End: 2024-03-04

## 2024-03-04 RX ORDER — MORPHINE SULFATE 4 MG/ML
4 INJECTION, SOLUTION INTRAMUSCULAR; INTRAVENOUS
Status: COMPLETED | OUTPATIENT
Start: 2024-03-04 | End: 2024-03-04

## 2024-03-04 RX ORDER — HYDROMORPHONE HYDROCHLORIDE 1 MG/ML
0.5 INJECTION, SOLUTION INTRAMUSCULAR; INTRAVENOUS; SUBCUTANEOUS EVERY 4 HOURS PRN
Status: DISCONTINUED | OUTPATIENT
Start: 2024-03-04 | End: 2024-03-06

## 2024-03-04 RX ORDER — TALC
6 POWDER (GRAM) TOPICAL NIGHTLY PRN
Status: DISCONTINUED | OUTPATIENT
Start: 2024-03-04 | End: 2024-03-10 | Stop reason: HOSPADM

## 2024-03-04 RX ORDER — SODIUM CHLORIDE, SODIUM LACTATE, POTASSIUM CHLORIDE, CALCIUM CHLORIDE 600; 310; 30; 20 MG/100ML; MG/100ML; MG/100ML; MG/100ML
INJECTION, SOLUTION INTRAVENOUS CONTINUOUS
Status: DISCONTINUED | OUTPATIENT
Start: 2024-03-04 | End: 2024-03-10

## 2024-03-04 RX ORDER — OXYCODONE HYDROCHLORIDE 10 MG/1
10 TABLET ORAL EVERY 4 HOURS PRN
Status: DISCONTINUED | OUTPATIENT
Start: 2024-03-04 | End: 2024-03-06

## 2024-03-04 RX ORDER — PROCHLORPERAZINE EDISYLATE 5 MG/ML
5 INJECTION INTRAMUSCULAR; INTRAVENOUS EVERY 6 HOURS PRN
Status: DISCONTINUED | OUTPATIENT
Start: 2024-03-04 | End: 2024-03-10 | Stop reason: HOSPADM

## 2024-03-04 RX ORDER — PANTOPRAZOLE SODIUM 40 MG/1
40 TABLET, DELAYED RELEASE ORAL DAILY
Status: DISCONTINUED | OUTPATIENT
Start: 2024-03-05 | End: 2024-03-07

## 2024-03-04 RX ORDER — IBUPROFEN 200 MG
16 TABLET ORAL
Status: DISCONTINUED | OUTPATIENT
Start: 2024-03-04 | End: 2024-03-10 | Stop reason: HOSPADM

## 2024-03-04 RX ORDER — AMOXICILLIN 250 MG
2 CAPSULE ORAL 2 TIMES DAILY
Status: DISCONTINUED | OUTPATIENT
Start: 2024-03-05 | End: 2024-03-10 | Stop reason: HOSPADM

## 2024-03-04 RX ORDER — DIAZEPAM 5 MG/1
5 TABLET ORAL
Status: COMPLETED | OUTPATIENT
Start: 2024-03-04 | End: 2024-03-04

## 2024-03-04 RX ORDER — HYDROMORPHONE HYDROCHLORIDE 1 MG/ML
1 INJECTION, SOLUTION INTRAMUSCULAR; INTRAVENOUS; SUBCUTANEOUS EVERY 4 HOURS PRN
Status: DISCONTINUED | OUTPATIENT
Start: 2024-03-04 | End: 2024-03-04

## 2024-03-04 RX ORDER — METHOCARBAMOL 500 MG/1
500 TABLET, FILM COATED ORAL 4 TIMES DAILY
Status: DISCONTINUED | OUTPATIENT
Start: 2024-03-04 | End: 2024-03-10 | Stop reason: HOSPADM

## 2024-03-04 RX ORDER — THIAMINE HCL 100 MG
100 TABLET ORAL DAILY
Status: DISCONTINUED | OUTPATIENT
Start: 2024-03-04 | End: 2024-03-10 | Stop reason: HOSPADM

## 2024-03-04 RX ORDER — ONDANSETRON HYDROCHLORIDE 2 MG/ML
4 INJECTION, SOLUTION INTRAVENOUS EVERY 6 HOURS PRN
Status: DISCONTINUED | OUTPATIENT
Start: 2024-03-04 | End: 2024-03-10 | Stop reason: HOSPADM

## 2024-03-04 RX ORDER — HYDROMORPHONE HYDROCHLORIDE 1 MG/ML
0.5 INJECTION, SOLUTION INTRAMUSCULAR; INTRAVENOUS; SUBCUTANEOUS
Status: COMPLETED | OUTPATIENT
Start: 2024-03-04 | End: 2024-03-04

## 2024-03-04 RX ORDER — SODIUM CHLORIDE 0.9 % (FLUSH) 0.9 %
10 SYRINGE (ML) INJECTION EVERY 12 HOURS PRN
Status: DISCONTINUED | OUTPATIENT
Start: 2024-03-04 | End: 2024-03-10 | Stop reason: HOSPADM

## 2024-03-04 RX ORDER — FAMOTIDINE 10 MG/ML
40 INJECTION INTRAVENOUS ONCE
Status: COMPLETED | OUTPATIENT
Start: 2024-03-04 | End: 2024-03-04

## 2024-03-04 RX ORDER — NALOXONE HCL 0.4 MG/ML
0.02 VIAL (ML) INJECTION
Status: DISCONTINUED | OUTPATIENT
Start: 2024-03-04 | End: 2024-03-10 | Stop reason: HOSPADM

## 2024-03-04 RX ORDER — IBUPROFEN 200 MG
24 TABLET ORAL
Status: DISCONTINUED | OUTPATIENT
Start: 2024-03-04 | End: 2024-03-10 | Stop reason: HOSPADM

## 2024-03-04 RX ORDER — ACETAMINOPHEN 325 MG/1
650 TABLET ORAL EVERY 6 HOURS PRN
Status: DISCONTINUED | OUTPATIENT
Start: 2024-03-04 | End: 2024-03-10 | Stop reason: HOSPADM

## 2024-03-04 RX ADMIN — MORPHINE SULFATE 4 MG: 4 INJECTION INTRAVENOUS at 01:03

## 2024-03-04 RX ADMIN — METHOCARBAMOL 500 MG: 500 TABLET ORAL at 09:03

## 2024-03-04 RX ADMIN — THERA TABS 1 TABLET: TAB at 03:03

## 2024-03-04 RX ADMIN — PREGABALIN 75 MG: 75 CAPSULE ORAL at 09:03

## 2024-03-04 RX ADMIN — HYDROMORPHONE HYDROCHLORIDE 0.5 MG: 1 INJECTION, SOLUTION INTRAMUSCULAR; INTRAVENOUS; SUBCUTANEOUS at 03:03

## 2024-03-04 RX ADMIN — SODIUM CHLORIDE 1000 ML: 9 INJECTION, SOLUTION INTRAVENOUS at 02:03

## 2024-03-04 RX ADMIN — SODIUM CHLORIDE, POTASSIUM CHLORIDE, SODIUM LACTATE AND CALCIUM CHLORIDE: 600; 310; 30; 20 INJECTION, SOLUTION INTRAVENOUS at 06:03

## 2024-03-04 RX ADMIN — POTASSIUM BICARBONATE 25 MEQ: 978 TABLET, EFFERVESCENT ORAL at 07:03

## 2024-03-04 RX ADMIN — MORPHINE SULFATE 4 MG: 4 INJECTION INTRAVENOUS at 12:03

## 2024-03-04 RX ADMIN — IOHEXOL 75 ML: 350 INJECTION, SOLUTION INTRAVENOUS at 08:03

## 2024-03-04 RX ADMIN — OXYCODONE HYDROCHLORIDE 10 MG: 10 TABLET ORAL at 10:03

## 2024-03-04 RX ADMIN — DROPERIDOL 1.25 MG: 2.5 INJECTION, SOLUTION INTRAMUSCULAR; INTRAVENOUS at 01:03

## 2024-03-04 RX ADMIN — HYDROMORPHONE HYDROCHLORIDE 0.5 MG: 1 INJECTION, SOLUTION INTRAMUSCULAR; INTRAVENOUS; SUBCUTANEOUS at 07:03

## 2024-03-04 RX ADMIN — FAMOTIDINE 40 MG: 10 INJECTION, SOLUTION INTRAVENOUS at 07:03

## 2024-03-04 RX ADMIN — DROPERIDOL 1.25 MG: 2.5 INJECTION, SOLUTION INTRAMUSCULAR; INTRAVENOUS at 12:03

## 2024-03-04 RX ADMIN — SODIUM CHLORIDE 1000 ML: 9 INJECTION, SOLUTION INTRAVENOUS at 12:03

## 2024-03-04 RX ADMIN — Medication 100 MG: at 03:03

## 2024-03-04 RX ADMIN — DIAZEPAM 5 MG: 5 TABLET ORAL at 03:03

## 2024-03-04 NOTE — PROVIDER PROGRESS NOTES - EMERGENCY DEPT.
Encounter Date: 3/4/2024    ED Physician Progress Notes         EKG - STEMI Decision  Initial Reading: No STEMI present.  Response: No Action Needed.

## 2024-03-04 NOTE — ED NOTES
Assumed care of the patient. Pt in hospital gown, side rails up X2, bed low and locked. No family/visitors at bedside at this time. Pt endorses N/V and abd pain. Pt has hx of pancreatitis and states that s/s are similar.

## 2024-03-04 NOTE — ED TRIAGE NOTES
Pt presents to ED with c/o N/V this AM and endorses blood in emesis. Pt endorses 8/10 generalized abd pain as well as a syncopal episode last night. Pt endorses pain to left arm. Pt denies any other c/o.

## 2024-03-04 NOTE — PROVIDER PROGRESS NOTES - EMERGENCY DEPT.
Encounter Date: 3/4/2024    ED Physician Progress Notes        Physician Note:   Tasia Cardona is a 23 y.o. male with PMH of alcohol use disorder, pancreatitis, splenic vein thrombosis, presenting to [facility] ED for abdominal pain.  Patient reporting severe abdominal pain x3 days.  States that this feels similar to his pancreatitis flares but is more severe in nature.  Patient reports multiple episodes of bilious emesis for the last 3 days.  Patient states that he fainted while walking his dog last evening.  Patient presenting with bruising and abrasions to his bilateral knees, right elbow.  Patient denies any prodrome prior to his fall but states that he was confused after his fall.  Patient does not believe he hit his head.    Patient was signed out to my care pending CT of the head.    CT shows Possible subtle sulcation abnormality with calcification in the right frontal lobe, similar to priors.  Appearance suggestive of a cortical malformation/developmental anomaly or possible remote insult.  This was discussed with the patient.  Counseled regarding need for follow up with his primary care doctor.  He has no acute focal neurologic deficit and this appears unchanged from prior imaging.     Attending Note:  I have seen the patient, have repeated the key portions of the history and physical, reviewed and agree with the medical documentation, and supervised and managed the medical care of the patient. Additionally, I was present for the critical portion of any procedure(s) performed.    LES Duque MD  Staff ED Physician

## 2024-03-04 NOTE — ED PROVIDER NOTES
Encounter Date: 3/4/2024       History     Chief Complaint   Patient presents with    Abdominal Pain    Vomiting     Vomited some blood with bile hx pancreatitis, fainted last night pain to L arm     Tasia Cardona is a 23 y.o. male with PMH of alcohol use disorder, pancreatitis, splenic vein thrombosis, presenting to [facility] ED for abdominal pain.  Patient reporting severe abdominal pain x3 days.  States that this feels similar to his pancreatitis flares but is more severe in nature.  Patient reports multiple episodes of bilious emesis for the last 3 days.  Patient states that he fainted while walking his dog last evening.  Patient presenting with bruising and abrasions to his bilateral knees, right elbow.  Patient denies any prodrome prior to his fall but states that he was confused after his fall.  Patient does not believe he hit his head.        Review of patient's allergies indicates:   Allergen Reactions    [place] Swelling    Pecan nut Swelling    Penicillins Hives     Tolerates cephalosporins    Tolerated piperacillin/tazobactam 11/2023    Soy Other (See Comments)    Sulfa (sulfonamide antibiotics) Hives     Past Medical History:   Diagnosis Date    Acute necrotizing pancreatitis 09/20/2023    Alcohol use disorder 10/05/2023    Asthma     Hepatitis C antibody positive in blood 09/18/2023 9/2023 PCR negative    HIV infection 10/2021    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     Past Surgical History:   Procedure Laterality Date    ENDOSCOPIC ULTRASOUND OF UPPER GASTROINTESTINAL TRACT N/A 10/23/2023    Procedure: ULTRASOUND, UPPER GI TRACT, ENDOSCOPIC;  Surgeon: Emil Villatoro MD;  Location: [address]);  Service: Endoscopy;  Laterality: N/A;    ERCP N/A 10/23/2023    Procedure: ERCP (ENDOSCOPIC  RETROGRADE CHOLANGIOPANCREATOGRAPHY);  Surgeon: Emil Villatoro MD;  Location: [facility] ([address] [place] [address]);  Service: Endoscopy;  Laterality: N/A;    ESOPHAGOGASTRODUODENOSCOPY N/A 9/18/2023    Procedure: EGD (ESOPHAGOGASTRODUODENOSCOPY);  Surgeon: Kg Cleaning MD;  Location: [facility] ([address] [place] [address]);  Service: Endoscopy;  Laterality: N/A;     Family History   Problem Relation Age of Onset    Pancreatitis Mother     Cancer Mother     Ovarian cancer Mother     No Known Problems Father     Cancer Brother     Breast cancer Maternal Grandmother      Social History     Tobacco Use    Smoking status: Former     Types: Cigarettes    Smokeless tobacco: Never   Substance Use Topics    Alcohol use: Not Currently    Drug use: Never     Review of Systems   Constitutional:  Negative for fever.   HENT:  Negative for congestion, rhinorrhea and sore throat.    Eyes:  Negative for visual disturbance.   Respiratory:  Negative for cough and shortness of breath.    Cardiovascular:  Negative for chest pain and leg swelling.   Gastrointestinal:  Positive for abdominal pain, diarrhea, nausea and vomiting.   Genitourinary:  Negative for dysuria and hematuria.   Neurological:  Positive for syncope. Negative for weakness.       Physical Exam     Initial Vitals [03/04/24 1122]   BP Pulse Resp Temp SpO2   (!) 128/51 (!) 118 18 98.3 °F (36.8 °C) 100 %      MAP       --         Physical Exam    Vitals reviewed.  Constitutional: He appears well-developed and well-nourished. He is cooperative.  Non-toxic appearance. He does not appear ill.   HENT:   Head: Normocephalic and atraumatic.   Mouth/Throat: Mucous membranes are normal. Mucous membranes are not dry.   Eyes: Conjunctivae are normal. Pupils are equal, round, and reactive to light.   Neck: Trachea normal and phonation normal.   Cardiovascular:  Normal rate, regular rhythm, normal heart sounds, intact distal pulses and normal pulses.     Exam reveals no gallop, no S3, no S4 and no friction rub.        No murmur heard.  Pulmonary/Chest: Breath sounds normal. No respiratory distress. He has no wheezes. He has no rhonchi. He has no rales.   Abdominal: Abdomen is soft. He exhibits no distension. There is abdominal tenderness.   Musculoskeletal:      Right lower leg: No edema.      Left lower leg: No edema.     Neurological: He is alert.   Patient observed moving all 4 extremities independently.    Sensation intact in all 4 extremities.       Skin: Skin is warm, dry and intact. Capillary refill takes less than 2 seconds.        No clinical evidence of head trauma.   Psychiatric: He has a normal mood and affect. His speech is normal.         ED Course   Procedures  Labs Reviewed   CBC W/ AUTO DIFFERENTIAL - Abnormal; Notable for the following components:       Result Value    Hemoglobin 11.0 (*)     Hematocrit 36.9 (*)     MCV 76 (*)     MCH 22.6 (*)     MCHC 29.8 (*)     RDW 17.0 (*)     MPV 9.1 (*)     Mono # 1.1 (*)     All other components within normal limits   COMPREHENSIVE METABOLIC PANEL - Abnormal; Notable for the following components:    CO2 17 (*)     Alkaline Phosphatase 136 (*)     AST 59 (*)     Anion Gap 18 (*)     All other components within normal limits   URINALYSIS, REFLEX TO URINE CULTURE - Abnormal; Notable for the following components:    Specific Gravity, UA >1.030 (*)     Protein, UA Trace (*)     Ketones, UA 2+ (*)     All other components within normal limits    Narrative:     Specimen Source->Urine   ALCOHOL,MEDICAL (ETHANOL) - Abnormal; Notable for the following components:    Alcohol, Serum 27 (*)     All other components within normal limits   LACTIC ACID, PLASMA - Abnormal; Notable for the following components:    Lactate (Lactic Acid) 2.3 (*)     All other components within normal limits   COMPREHENSIVE METABOLIC PANEL - Abnormal; Notable for the following components:    Sodium 134 (*)     Potassium 3.2 (*)     CO2 20 (*)     Calcium 8.2 (*)     Albumin 3.0 (*)     AST 54 (*)     ALT 9  (*)     All other components within normal limits   CBC W/ AUTO DIFFERENTIAL - Abnormal; Notable for the following components:    RBC 3.83 (*)     Hemoglobin 8.6 (*)     Hematocrit 29.9 (*)     MCV 78 (*)     MCH 22.5 (*)     MCHC 28.8 (*)     RDW 17.1 (*)     All other components within normal limits   TOXICOLOGY SCREEN, URINE, RANDOM (COMPLIANCE) - Abnormal; Notable for the following components:    Benzodiazepines Presumptive Positive (*)     Cocaine (Metab.) Presumptive Positive (*)     Opiate Scrn, Ur Presumptive Positive (*)     THC Presumptive Positive (*)     All other components within normal limits    Narrative:     Specimen Source->Urine   ISTAT PROCEDURE - Abnormal; Notable for the following components:    POC Sodium 135 (*)     POC Potassium 3.3 (*)     POC TCO2 (MEASURED) 18 (*)     All other components within normal limits   LIPASE   MAGNESIUM   LACTIC ACID, PLASMA    Narrative:     Complete after IVF are complete please   MAGNESIUM   TOXICOLOGY SCREEN, URINE, RANDOM (COMPLIANCE)   PHOSPHATIDYLETHANOL (PETH)   ISTAT CHEM8     EKG Readings: (Independently Interpreted)   Initial Reading: No STEMI. Previous EKG: Compared with most recent EKG Rhythm: Sinus Tachycardia. Heart Rate: 115. Ectopy: No Ectopy. Conduction: Normal. ST Segments: Normal ST Segments.     ECG Results              EKG 12-lead (Final result)        Collection Time Result Time QRS Duration OHS QTC Calculation    03/04/24 11:21:47 03/04/24 15:10:55 74 445                     Final result by Interface, Lab In [facility] (03/04/24 15:11:02)                   Narrative:    Test Reason : R55,    Vent. Rate : 115 BPM     Atrial Rate : 115 BPM     P-R Int : 128 ms          QRS Dur : 074 ms      QT Int : 322 ms       P-R-T Axes : 083 076 063 degrees     QTc Int : 445 ms    Sinus tachycardia  Otherwise normal ECG  When compared with ECG of 12-FEB-2024 10:10,  No significant change was found  Confirmed by Salbador DYER MD ([id number]) on 3/4/2024 3:10:54  PM    Referred By:             Confirmed By:Salbador DYER MD                                  Imaging Results              CT Abdomen Pelvis With IV Contrast NO Oral Contrast (Final result)  Result time 03/04/24 22:15:44      Final result by Reyes Gary MD (03/04/24 22:15:44)                   Impression:      1. Sequela pancreatitis with multiple fluid collections in the upper abdomen, some of which are new/worse and some which have improved from prior study.  New thin walled collection adjacent to the stomach measures up to 4 cm in size.  2. New/worse significant lower esophageal wall thickening.  Suggest correlation for any clinical signs of esophagitis and/or recent vomiting.  3. 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  4. Hepatosplenomegaly and probable hepatic steatosis.  5. Additional findings as detailed above.    Electronically signed by resident: Chrissy Beverly  Date:    03/04/2024  Time:    20:17    Electronically signed by: Reyes Gary MD  Date:    03/04/2024  Time:    22:15               Narrative:    EXAMINATION:  CT ABDOMEN PELVIS WITH IV CONTRAST    CLINICAL HISTORY:  Pancreatitis, acute, severe;    TECHNIQUE:  Low dose axial images, sagittal and coronal reformations were obtained from the lung bases to the pubic symphysis following the IV administration of 75 mL of Omnipaque 350.  Oral contrast was not given.    COMPARISON:  CTA abdomen pelvis 01/06/2024, 12/29/2023, 12/25/2023.    FINDINGS:  Heart: Unremarkable.    Lung bases: Left lower lobe subsegmental atelectasis.  No consolidation or pleural effusion.    Liver: Liver is enlarged measuring 20 cm in craniocaudal dimension.  No focal hepatic abnormality.    Gallbladder: Normal in appearance without evidence for cholecystitis.    Bile Ducts: No intra or extrahepatic biliary ductal dilation.    Pancreas: Similar mild peripancreatic fat stranding.    Peripancreatic collection is again noted, some of  which are new or worse when compared with the prior exam.    New collection along the greater curvature of the stomach measures approximately 4 x 3 x 4 cm in size (axial image 44 and coronal image 114).  This collection is relatively thin walled without internal air.    Small volume relatively unorganized fluid between the body of the pancreas and the inferior wall of the stomach.    Similar to mildly decreased size of small fluid collections along the anterior aspect of the pancreas (axial image 60)    Collection extending along the anterosuperior pancreas is relatively stable in size (axial image 54).    Spleen: Spleen is enlarged measuring 13 cm in craniocaudal dimension.    Adrenals: Unremarkable.    Kidneys/ Ureters: Kidneys are normal in size and enhance symmetrically.  No nephrolithiasis or hydroureteronephrosis.    Bladder: Smooth contours without bladder wall thickening.    Reproductive organs: Unremarkable.    GI Tract/Mesentery: New/worse lower esophageal wall thickening with significant mucosal edema.  Minimal wall thickening at the gastric fundus, mildly improved from most recent prior.  Visualized loops of small and large bowel are normal in caliber without evidence for obstruction or inflammation. Transverse colon wall thickening has improved.  Appendix is unremarkable.    No abdominopelvic ascites, intraperitoneal free air, or significant adenopathy.    Abdominal wall/extraperitoneal soft tissues: Unremarkable.    Vasculature: Abdominal aorta is normal in caliber, contour, and course without significant calcific atherosclerosis.  Retroaortic left renal vein.    Portal vein is patent.  Chronic splenic vein occlusion with numerous large collateral vessels in the upper abdomen.    Bones: No acute displaced fracture.                                        CT Head Without Contrast (Final result)  Result time 03/04/24 17:02:35      Final result by Fritz Son MD (03/04/24 17:02:35)                    Impression:      No acute hemorrhage or major vascular territory infarct.    Possible subtle sulcation abnormality with calcification in the right frontal lobe, similar to priors.  Appearance suggestive of a cortical malformation/developmental anomaly or possible remote insult.  MRI may be helpful for further characterization if warranted clinically.    This report was flagged in Epic as abnormal.    Electronically signed by resident: Darien Sprague  Date:    03/04/2024  Time:    16:44    Electronically signed by: Fritz Son MD  Date:    03/04/2024  Time:    17:02               Narrative:    EXAMINATION:  CT HEAD WITHOUT CONTRAST    CLINICAL HISTORY:  Syncope, recurrent;    TECHNIQUE:  Low dose axial CT images obtained throughout the head without the use of intravenous contrast.  Axial, sagittal and coronal reconstructions were performed.    COMPARISON:  CT head 12/16/2022    FINDINGS:  Intracranial compartment:    Ventricles and sulci are normal in size without evidence of hydrocephalus.    Apparent deep sulcus in the right frontal lobe (series 601, image 60; series 2, image 17).  Possible small focus underlying white matter loss with punctate calcification.  Overall appearance unchanged from prior.  No new parenchymal hemorrhage, edema, mass effect or major vascular distribution infarct.    No extra-axial blood or fluid collections.    Skull/extracranial contents (limited evaluation):    No displaced calvarial fracture.    The mastoid air cells are clear.  Patchy mucosal thickening of the ethmoid air cells.  Remainder of the visualized paranasal sinuses are essentially clear.                                       X-Ray Knee 3 View Bilateral (Final result)  Result time 03/04/24 13:27:57   Procedure changed from X-Ray Knee 3 View Right     Final result by Abbe Meza MD (03/04/24 13:27:57)                   Impression:      See above      Electronically signed by: Abbe Meza  MD  Date:    03/04/2024  Time:    13:27               Narrative:    EXAMINATION:  XR KNEE 3 VIEW BILATERAL    CLINICAL HISTORY:  Fall; Unspecified fall, initial encounter    TECHNIQUE:  AP, lateral, and Merchant views of both knees were performed.    COMPARISON:  None    FINDINGS:  No fracture or dislocation.  No bone destruction identified.  Slight soft tissue swelling noted anterior to the right knee joint.                                       X-Ray Elbow Complete Right (Final result)  Result time 03/04/24 13:35:52      Final result by Abbe Meza MD (03/04/24 13:35:52)                   Impression:      See above      Electronically signed by: Abbe Meza MD  Date:    03/04/2024  Time:    13:35               Narrative:    EXAMINATION:  XR ELBOW COMPLETE 3 VIEW RIGHT    CLINICAL HISTORY:  . Unspecified fall, initial encounter    TECHNIQUE:  AP, lateral, and oblique views of the right elbow were performed.    COMPARISON:  None    FINDINGS:  No fracture or dislocation.  No bone destruction identified                                       Medications   multivitamin tablet (1 tablet Oral Given 3/7/24 0918)   thiamine tablet 100 mg (100 mg Oral Given 3/7/24 0919)   lactated ringers infusion ( Intravenous New Bag 3/7/24 1842)   folic acid tablet 1 mg (1 mg Oral Given 3/7/24 0919)   qscalwevp-xmaohwkw-coyxxmq ala -25 mg (25 kg or greater) 1 tablet (1 tablet Oral Given 3/7/24 0919)   senna-docusate 8.6-50 mg per tablet 2 tablet (2 tablets Oral Given 3/7/24 2037)   sodium chloride 0.9% flush 10 mL (has no administration in time range)   naloxone 0.4 mg/mL injection 0.02 mg (has no administration in time range)   glucose chewable tablet 16 g (has no administration in time range)   glucose chewable tablet 24 g (has no administration in time range)   glucagon (human recombinant) injection 1 mg (has no administration in time range)   acetaminophen tablet 650 mg (has no administration in time range)   ondansetron  injection 4 mg (has no administration in time range)   prochlorperazine injection Soln 5 mg (5 mg Intravenous Given 3/7/24 1209)   melatonin tablet 6 mg (6 mg Oral Given 3/6/24 2225)   dextrose 10% bolus 125 mL 125 mL (has no administration in time range)   dextrose 10% bolus 250 mL 250 mL (has no administration in time range)   pregabalin capsule 75 mg (75 mg Oral Given 3/7/24 2037)   methocarbamoL tablet 500 mg (500 mg Oral Given 3/7/24 2037)   sucralfate 100 mg/mL suspension 1 g (1 g Oral Given 3/7/24 1713)   pantoprazole injection 40 mg (40 mg Intravenous Given 3/7/24 2037)   HYDROmorphone injection 1.5 mg (1.5 mg Intravenous Given 3/7/24 2037)   HYDROmorphone tablet 2 mg (2 mg Oral Given 3/7/24 1842)   ALPRAZolam tablet 0.5 mg (0.5 mg Oral Given 3/7/24 1951)   polyethylene glycol packet 17 g (has no administration in time range)   droPERidol injection 1.25 mg (1.25 mg Intravenous Given 3/4/24 1252)   morphine injection 4 mg (4 mg Intravenous Given 3/4/24 1254)   sodium chloride 0.9% bolus 1,000 mL 1,000 mL (0 mLs Intravenous Stopped 3/4/24 1356)   droPERidol injection 1.25 mg (1.25 mg Intravenous Given 3/4/24 1358)   morphine injection 4 mg (4 mg Intravenous Given 3/4/24 1358)   sodium chloride 0.9% bolus 1,000 mL 1,000 mL (0 mLs Intravenous Stopped 3/4/24 1650)   diazePAM tablet 5 mg (5 mg Oral Given 3/4/24 1519)   HYDROmorphone injection 0.5 mg (0.5 mg Intravenous Given 3/4/24 1519)   potassium bicarbonate disintegrating tablet 25 mEq (25 mEq Oral Given 3/4/24 1918)   famotidine (PF) injection 40 mg (40 mg Intravenous Given 3/4/24 1919)   iohexoL (OMNIPAQUE 350) injection 75 mL (75 mLs Intravenous Given 3/4/24 2014)   potassium chloride SA CR tablet 40 mEq (40 mEq Oral Given 3/5/24 0914)   LORazepam tablet 1 mg (1 mg Oral Given 3/6/24 2108)   potassium bicarbonate disintegrating tablet 50 mEq (50 mEq Oral Given 3/6/24 2225)     Medical Decision Making  3-year-old male with history of alcohol use disorder,  pancreatitis presenting for abdominal pain, vomiting.  Initially, patient is tachycardic but overall hemodynamically stable and well-appearing.      Patient's symptoms appear to be most concerning for pancreatitis flare.  Bedside ultrasound conducted by ED ultrasound team shows no evidence of cholecystitis or cholelithiasis.  Since patient had episode of syncope yesterday which was unwitnessed, unable to verify patient hit head and has had vomiting since his fall, will obtain CT head to evaluate for intracranial pathology.  Will obtain imaging of the right elbow and bilateral knees due to evidence of trauma and tenderness to palpation.  Will evaluate for electrolyte abnormalities, acidosis.    Workup revealing normal lipase which does not exclude acute pancreatitis.  Patient does have an anion gap acidosis which could be attributed to alcohol ketosis.  Will administer IV fluids for fluid resuscitation.  Patient given morphine/droperidol for symptomatic control.  Patient states that he has had a single glass of wine, blood ethanol level is 27.  LFTs consistent with alcohol use disorder.  Will give multivitamin and thiamine.      Upon multiple reassessments, patient requiring repeat dosing of IV pain medication.  Will likely admit for acute pancreatitis flare.  CIWA of 9, will give p.o. Valium.  Discussed patient's case with oncoming team, patient pending CT head.        Amount and/or Complexity of Data Reviewed  External Data Reviewed: labs, radiology, ECG and notes.  Labs: ordered. Decision-making details documented in ED Course.  Radiology: ordered and independent interpretation performed. Decision-making details documented in ED Course.  ECG/medicine tests: ordered and independent interpretation performed.    Risk  OTC drugs.  Prescription drug management.  Decision regarding hospitalization.              Attending Attestation:   Physician Attestation Statement for Resident:  As the supervising MD   Physician  Attestation Statement: I have personally seen and examined this patient.   I agree with the above history.  -:   As the supervising MD I agree with the above PE.     As the supervising MD I agree with the above treatment, course, plan, and disposition.                Attending ED Notes:   STAFF ATTENDING PHYSICIAN NOTE:  I have individually/jointly evaluated Tasia Cardona and discussed their ED management with the resident physician. I have also reviewed their notes, assessments, and procedures documented.  I was present during all critical portions of any procedure(s) performed on Tasia Cardona.   ____________________  Ariel Vela MD, [facility]  Emergency Medicine Staff        ED Course as of 03/07/24 2338   Mon Mar 04, 2024   1240 Anion Gap(!): 18 [ES]   1240 CO2(!): 17 [ES]   1240 Hemoglobin(!): 11.0 [ES]   1240 MCV(!): 76 [ES]   1240 Lactic Acid Level(!): 2.3 [ES]   1240 Alcohol, Serum(!): 27 [ES]   1240 Lipase: 31 [ES]   1408 X-Ray Knee 3 View Bilateral  No fracture dislocation of bilateral knees. [ES]   1408 X-Ray Elbow Complete Right  No fracture or dislocation of the right elbow. [ES]   1408 AST(!): 59  Consistent with alcohol use disorder. [ES]   1409 MCV(!): 76 [ES]   1409 Hemoglobin(!): 11.0  Microcytic anemia consistent with baseline. [ES]      ED Course User Index  [ES] Gayathri Monet MD                           Clinical Impression:  Final diagnoses:  [R55] Syncope  [W19.XXXA] Fall  [K85.20] Alcohol-induced acute pancreatitis, unspecified complication status (Primary)          ED Disposition Condition    Admit                 Gayathri Monet MD  Resident  03/04/24 [id number]       Yossi Vela MD  03/07/24 0618

## 2024-03-04 NOTE — FIRST PROVIDER EVALUATION
"Medical screening examination initiated.  I have conducted a focused provider triage encounter, findings are as follows:    Brief history of present illness:  22 yo M w/ h/o HIV on Biktarvy, chronic abdominal pain w/ recurrent pancreatitis and ETOH use disorder. C/o abdominal pain and vomiting bile x 1 day. Reports syncope x 1 last night    Vitals:    03/04/24 1122   BP: (!) 128/51   Pulse: (!) 118   Resp: 18   Temp: 98.3 °F (36.8 °C)   TempSrc: Oral   SpO2: 100%   Weight: 63.5 kg (140 lb)   Height: 5' 10" (1.778 m)       Pertinent physical exam:  nontoxic, gait normal    Brief workup plan:  cbc, cmp, lipase    Preliminary workup initiated; this workup will be continued and followed by the physician or advanced practice provider that is assigned to the patient when roomed.  "

## 2024-03-04 NOTE — ED NOTES
I-STAT Chem-8+ Results:   Value Reference Range   Sodium 135 136-145 mmol/L   Potassium  3.3 3.5-5.1 mmol/L   Chloride 101  mmol/L   Ionized Calcium 1.08 1.06-1.42 mmol/L   CO2 (measured) 18 23-29 mmol/L   Glucose 84  mg/dL   BUN 8 6-30 mg/dL   Creatinine 0.7 0.5-1.4 mg/dL   Hematocrit 39 36-54%

## 2024-03-05 PROBLEM — E87.29 HIGH ANION GAP METABOLIC ACIDOSIS: Status: ACTIVE | Noted: 2024-03-05

## 2024-03-05 LAB
ALBUMIN SERPL BCP-MCNC: 3 G/DL (ref 3.5–5.2)
ALP SERPL-CCNC: 95 U/L (ref 55–135)
ALT SERPL W/O P-5'-P-CCNC: 9 U/L (ref 10–44)
AMPHET+METHAMPHET UR QL: NEGATIVE
ANION GAP SERPL CALC-SCNC: 9 MMOL/L (ref 8–16)
AST SERPL-CCNC: 54 U/L (ref 10–40)
BARBITURATES UR QL SCN>200 NG/ML: NEGATIVE
BASOPHILS # BLD AUTO: 0.03 K/UL (ref 0–0.2)
BASOPHILS NFR BLD: 0.5 % (ref 0–1.9)
BENZODIAZ UR QL SCN>200 NG/ML: ABNORMAL
BILIRUB SERPL-MCNC: 0.5 MG/DL (ref 0.1–1)
BILIRUB UR QL STRIP: NEGATIVE
BUN SERPL-MCNC: 7 MG/DL (ref 6–20)
BZE UR QL SCN: ABNORMAL
CALCIUM SERPL-MCNC: 8.2 MG/DL (ref 8.7–10.5)
CANNABINOIDS UR QL SCN: ABNORMAL
CHLORIDE SERPL-SCNC: 105 MMOL/L (ref 95–110)
CLARITY UR REFRACT.AUTO: CLEAR
CO2 SERPL-SCNC: 20 MMOL/L (ref 23–29)
COLOR UR AUTO: YELLOW
CREAT SERPL-MCNC: 0.7 MG/DL (ref 0.5–1.4)
CREAT UR-MCNC: 126 MG/DL (ref 23–375)
DIFFERENTIAL METHOD BLD: ABNORMAL
EOSINOPHIL # BLD AUTO: 0.1 K/UL (ref 0–0.5)
EOSINOPHIL NFR BLD: 1.4 % (ref 0–8)
ERYTHROCYTE [DISTWIDTH] IN BLOOD BY AUTOMATED COUNT: 17.1 % (ref 11.5–14.5)
EST. GFR  (NO RACE VARIABLE): >60 ML/MIN/1.73 M^2
ETHANOL UR-MCNC: <10 MG/DL
GLUCOSE SERPL-MCNC: 90 MG/DL (ref 70–110)
GLUCOSE UR QL STRIP: NEGATIVE
HCT VFR BLD AUTO: 29.9 % (ref 40–54)
HGB BLD-MCNC: 8.6 G/DL (ref 14–18)
HGB UR QL STRIP: NEGATIVE
IMM GRANULOCYTES # BLD AUTO: 0.02 K/UL (ref 0–0.04)
IMM GRANULOCYTES NFR BLD AUTO: 0.3 % (ref 0–0.5)
KETONES UR QL STRIP: ABNORMAL
LEUKOCYTE ESTERASE UR QL STRIP: NEGATIVE
LYMPHOCYTES # BLD AUTO: 1.7 K/UL (ref 1–4.8)
LYMPHOCYTES NFR BLD: 27.8 % (ref 18–48)
MAGNESIUM SERPL-MCNC: 1.9 MG/DL (ref 1.6–2.6)
MCH RBC QN AUTO: 22.5 PG (ref 27–31)
MCHC RBC AUTO-ENTMCNC: 28.8 G/DL (ref 32–36)
MCV RBC AUTO: 78 FL (ref 82–98)
METHADONE UR QL SCN>300 NG/ML: NEGATIVE
MONOCYTES # BLD AUTO: 0.6 K/UL (ref 0.3–1)
MONOCYTES NFR BLD: 9.4 % (ref 4–15)
NEUTROPHILS # BLD AUTO: 3.8 K/UL (ref 1.8–7.7)
NEUTROPHILS NFR BLD: 60.6 % (ref 38–73)
NITRITE UR QL STRIP: NEGATIVE
NRBC BLD-RTO: 0 /100 WBC
OPIATES UR QL SCN: ABNORMAL
PCP UR QL SCN>25 NG/ML: NEGATIVE
PH UR STRIP: 6 [PH] (ref 5–8)
PLATELET # BLD AUTO: 214 K/UL (ref 150–450)
PMV BLD AUTO: 10.1 FL (ref 9.2–12.9)
POTASSIUM SERPL-SCNC: 3.2 MMOL/L (ref 3.5–5.1)
PROT SERPL-MCNC: 6 G/DL (ref 6–8.4)
PROT UR QL STRIP: ABNORMAL
RBC # BLD AUTO: 3.83 M/UL (ref 4.6–6.2)
SODIUM SERPL-SCNC: 134 MMOL/L (ref 136–145)
SP GR UR STRIP: >1.03 (ref 1–1.03)
TOXICOLOGY INFORMATION: ABNORMAL
URN SPEC COLLECT METH UR: ABNORMAL
WBC # BLD AUTO: 6.25 K/UL (ref 3.9–12.7)

## 2024-03-05 PROCEDURE — 85025 COMPLETE CBC W/AUTO DIFF WBC: CPT | Performed by: NURSE PRACTITIONER

## 2024-03-05 PROCEDURE — 21400001 HC TELEMETRY ROOM

## 2024-03-05 PROCEDURE — 80053 COMPREHEN METABOLIC PANEL: CPT | Performed by: NURSE PRACTITIONER

## 2024-03-05 PROCEDURE — 25000003 PHARM REV CODE 250

## 2024-03-05 PROCEDURE — 25000003 PHARM REV CODE 250: Performed by: NURSE PRACTITIONER

## 2024-03-05 PROCEDURE — 81003 URINALYSIS AUTO W/O SCOPE: CPT | Performed by: EMERGENCY MEDICINE

## 2024-03-05 PROCEDURE — 11000001 HC ACUTE MED/SURG PRIVATE ROOM

## 2024-03-05 PROCEDURE — 63600175 PHARM REV CODE 636 W HCPCS: Performed by: NURSE PRACTITIONER

## 2024-03-05 PROCEDURE — 25000003 PHARM REV CODE 250: Performed by: STUDENT IN AN ORGANIZED HEALTH CARE EDUCATION/TRAINING PROGRAM

## 2024-03-05 PROCEDURE — 80307 DRUG TEST PRSMV CHEM ANLYZR: CPT | Performed by: STUDENT IN AN ORGANIZED HEALTH CARE EDUCATION/TRAINING PROGRAM

## 2024-03-05 PROCEDURE — 83735 ASSAY OF MAGNESIUM: CPT | Performed by: NURSE PRACTITIONER

## 2024-03-05 RX ORDER — SUCRALFATE 1 G/10ML
1 SUSPENSION ORAL EVERY 6 HOURS
Status: DISCONTINUED | OUTPATIENT
Start: 2024-03-05 | End: 2024-03-10 | Stop reason: HOSPADM

## 2024-03-05 RX ORDER — POTASSIUM CHLORIDE 20 MEQ/1
40 TABLET, EXTENDED RELEASE ORAL ONCE
Status: COMPLETED | OUTPATIENT
Start: 2024-03-05 | End: 2024-03-05

## 2024-03-05 RX ADMIN — METHOCARBAMOL 500 MG: 500 TABLET ORAL at 09:03

## 2024-03-05 RX ADMIN — PANTOPRAZOLE SODIUM 40 MG: 40 TABLET, DELAYED RELEASE ORAL at 08:03

## 2024-03-05 RX ADMIN — SUCRALFATE 1 G: 1 SUSPENSION ORAL at 05:03

## 2024-03-05 RX ADMIN — PROCHLORPERAZINE EDISYLATE 5 MG: 5 INJECTION INTRAMUSCULAR; INTRAVENOUS at 10:03

## 2024-03-05 RX ADMIN — BICTEGRAVIR SODIUM, EMTRICITABINE, AND TENOFOVIR ALAFENAMIDE FUMARATE 1 TABLET: 50; 200; 25 TABLET ORAL at 09:03

## 2024-03-05 RX ADMIN — OXYCODONE HYDROCHLORIDE 10 MG: 10 TABLET ORAL at 04:03

## 2024-03-05 RX ADMIN — OXYCODONE HYDROCHLORIDE 10 MG: 10 TABLET ORAL at 12:03

## 2024-03-05 RX ADMIN — HYDROMORPHONE HYDROCHLORIDE 0.5 MG: 1 INJECTION, SOLUTION INTRAMUSCULAR; INTRAVENOUS; SUBCUTANEOUS at 04:03

## 2024-03-05 RX ADMIN — PREGABALIN 75 MG: 75 CAPSULE ORAL at 09:03

## 2024-03-05 RX ADMIN — THERA TABS 1 TABLET: TAB at 08:03

## 2024-03-05 RX ADMIN — HYDROMORPHONE HYDROCHLORIDE 0.5 MG: 1 INJECTION, SOLUTION INTRAMUSCULAR; INTRAVENOUS; SUBCUTANEOUS at 12:03

## 2024-03-05 RX ADMIN — SUCRALFATE 1 G: 1 SUSPENSION ORAL at 11:03

## 2024-03-05 RX ADMIN — PREGABALIN 75 MG: 75 CAPSULE ORAL at 08:03

## 2024-03-05 RX ADMIN — SENNOSIDES AND DOCUSATE SODIUM 2 TABLET: 8.6; 5 TABLET ORAL at 08:03

## 2024-03-05 RX ADMIN — POTASSIUM CHLORIDE 40 MEQ: 1500 TABLET, EXTENDED RELEASE ORAL at 09:03

## 2024-03-05 RX ADMIN — Medication 100 MG: at 08:03

## 2024-03-05 RX ADMIN — METHOCARBAMOL 500 MG: 500 TABLET ORAL at 12:03

## 2024-03-05 RX ADMIN — HYDROMORPHONE HYDROCHLORIDE 0.5 MG: 1 INJECTION, SOLUTION INTRAMUSCULAR; INTRAVENOUS; SUBCUTANEOUS at 11:03

## 2024-03-05 RX ADMIN — HYDROMORPHONE HYDROCHLORIDE 0.5 MG: 1 INJECTION, SOLUTION INTRAMUSCULAR; INTRAVENOUS; SUBCUTANEOUS at 06:03

## 2024-03-05 RX ADMIN — METHOCARBAMOL 500 MG: 500 TABLET ORAL at 04:03

## 2024-03-05 RX ADMIN — OXYCODONE HYDROCHLORIDE 10 MG: 10 TABLET ORAL at 09:03

## 2024-03-05 RX ADMIN — FOLIC ACID 1 MG: 1 TABLET ORAL at 08:03

## 2024-03-05 RX ADMIN — HYDROMORPHONE HYDROCHLORIDE 0.5 MG: 1 INJECTION, SOLUTION INTRAMUSCULAR; INTRAVENOUS; SUBCUTANEOUS at 01:03

## 2024-03-05 RX ADMIN — Medication 6 MG: at 11:03

## 2024-03-05 RX ADMIN — SUCRALFATE 1 G: 1 SUSPENSION ORAL at 12:03

## 2024-03-05 RX ADMIN — HYDROMORPHONE HYDROCHLORIDE 0.5 MG: 1 INJECTION, SOLUTION INTRAMUSCULAR; INTRAVENOUS; SUBCUTANEOUS at 09:03

## 2024-03-05 RX ADMIN — OXYCODONE HYDROCHLORIDE 10 MG: 10 TABLET ORAL at 08:03

## 2024-03-05 RX ADMIN — METHOCARBAMOL 500 MG: 500 TABLET ORAL at 08:03

## 2024-03-05 RX ADMIN — SENNOSIDES AND DOCUSATE SODIUM 2 TABLET: 8.6; 5 TABLET ORAL at 09:03

## 2024-03-05 NOTE — ED NOTES
Assumed care at this time. The patient is awake, alert and cooperative with a calm affect, patient is aware of environment. Airway is open and patent, respirations are spontaneous, normal respiratory effort and rate noted, skin warm and dry, moves all extremities well, appearance: no apparent distress noted.Pain med requested.

## 2024-03-05 NOTE — ASSESSMENT & PLAN NOTE
Patient was diagnosed with HIV 2 years ago and has been on anti retroviral therapy. He is sexually active with male partners. His most recent CD4 count was 1080 (10/2023).     - Continue home biktarvy

## 2024-03-05 NOTE — ASSESSMENT & PLAN NOTE
-Bicarb 17, anion gap 18 on admit likely due to starvation ketoacidosis vs alcoholic ketoacidosis although patient denies significant EtOH consumption.  -Continue IVF.  -CLD.  -PRN antiemetics.  -Monitor on labs.

## 2024-03-05 NOTE — ASSESSMENT & PLAN NOTE
Patient reports passing out while walking his dog last night. Endorses increased fatigue and generalized weakness due to significant nausea, vomiting, and decreased PO intake. Denies any significant chest pain, SOB, lightheadedness, or vision changes prior to the episode. Unsure if he struck his head but no obvious signs of head trauma on exam   Likely related to significant dehydration.   -Labs with high anion gap metabolic acidosis. Lactic 2.3>0.7. EtOH 27  -UA/UDS pending.  -EKG with ST, no acute ischemic changes  -Check orthostatics.  -CTH with no acute hemorrhage or major vascular territory infarct. Possible subtle sulcation abnormality with calcification in the right frontal lobe, similar to priors.  -Continue IVF.  -Tele-monitoring.   -Fall precautions.

## 2024-03-05 NOTE — MEDICAL/APP STUDENT
"  History   24 y/o  male PMH alcohol use disorder, pancreatitis, splenic vein thrombosis, HIV(currently managed with Bictarvy) presents with 3 day history of  worsening abdominal pain, nausea, emesis and recent ground level fall without LOC while walking his dog last evening. Patient endorses bilious and intermittent dark bloody emesis.Patient specifies abdominal pain in LUQ and LLQ that intermittently radiates to shoulder.  Patient also endorses chest pain he describes as cramping and burning which he associates with the increased strain of emesis.Patient with prior alcohol use disorder diagnosis. States last drink was 1 glass of red wine 3-4 days PTA.Patient denies any recent binge drinking episodes however alcohol serum:27. Patient denies experiencing any withdrawal symptoms in past. CIWA 9      Patient denies radiating pain to shoulders or jaw. Patient denies any SOB, fevers or cough during this time. Patient denies any dizziness or visual field disturbances preceding fall but endorses increased fatigue x 3-4 days and does not believe he hit his head. Patient with scant bruising to bilateral knees and elbow. Patient states nausea, abd pain, and fatigue are mildly improved since arrival.    ED Course: Anion Gap 18  -CO2 17  -HgB 11, MCV 11.0,   -Lactic Acid Level 2.3  -Alcohol serum 27  -Lipase 31  -AST-59  -Glucose wnl    -Xray Knee bilateral- no fracture/dislocation, Xray Elbow- No fracture or dislocation  -CT Head w/o contrast:  No acute hemorrhage or major vascular territory infarct   -EKG Sinus Tachy otherwise normal with no changes compare to prior  -CT abdomen pelvis with contrast: "Sequela pancreatitis with multiple fluid collections in upper abd, some of which are new/worse some improved from prior study. New thin walled adjacent to stomach 4cm in size, new/worse significant lower esophageal wall thickening, chronic splenic vein thrombosis with collateral vessels in upper abd, hepatosplenomegaly and " "probable hepatic steatosis"    -Droperidol, IV morphine 4mg, NS Bolus, thiamine 100mg PO, diazepam 5mg PO given, Potassium Bicarb 25mEq PO, Famotidine 40 IVin ED.             Chief Complaint   Patient presents with    Abdominal Pain    Vomiting     Vomited some blood with bile hx pancreatitis, fainted last night pain to L arm     Abdominal Pain  Associated symptoms include vomiting.   Vomiting   Associated symptoms include abdominal pain.     A/P    Acute on Chronic  Alcohol induced Pancreatitis  -CMP morning draw  -CBC morning draw  -CLD as tolerated NPO at midnight  -LR infusion 125mL/hour  -CT abd pelvis: "Sequela pancreatitis with multiple fluid collections in upper abd, some of which are new/worse some improved from prior study. New thin walled adjacent to stomach 4cm in size, new/worse significant lower esophageal wall thickening, chronic splenic vein thrombosis with collateral vessels in upper abd, hepatosplenomegaly and probable hepatic steatosis"  - Folic acid 1mg Daily PO  -Drinking Cessation education    Abdominal Pain  -Oxycodone 10mg PO PRN      Nausua  -Prochlorperazine 5mg IV PRN  -Ondansetron 4mg IV PRN      Syncope:  -LR infustion 125mL/hour  -CBC morning draw  -CMP morning Draw    PMH HIV:  Continue home Bictarvy    GERD:   -Protonix 40mg IV Daily until PO tolerable      Past Medical History:   Diagnosis Date    Acute necrotizing pancreatitis 09/20/2023    Alcohol use disorder 10/05/2023    Asthma     Hepatitis C antibody positive in blood 09/18/2023 9/2023 PCR negative    HIV infection 10/2021    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       Past Surgical History:   Procedure Laterality Date    ENDOSCOPIC ULTRASOUND OF UPPER GASTROINTESTINAL TRACT N/A 10/23/2023    Procedure: ULTRASOUND, " "UPPER GI TRACT, ENDOSCOPIC;  Surgeon: Emil Villatoro MD;  [facility]  Service: Endoscopy;  Laterality: N/A;    ERCP N/A 10/23/2023    Procedure: ERCP (ENDOSCOPIC RETROGRADE CHOLANGIOPANCREATOGRAPHY);  Surgeon: Emil Villatoro MD;  [facility]  Service: Endoscopy;  Laterality: N/A;    ESOPHAGOGASTRODUODENOSCOPY N/A 9/18/2023    Procedure: EGD (ESOPHAGOGASTRODUODENOSCOPY);  Surgeon: Kg Cleaning MD;  Location: [facility] ([address] [place] [address]);  Service: Endoscopy;  Laterality: N/A;       Family History   Problem Relation Age of Onset    Pancreatitis Mother     Cancer Mother     Ovarian cancer Mother     No Known Problems Father     Cancer Brother     Breast cancer Maternal Grandmother        Social History     Tobacco Use    Smoking status: Former     Types: Cigarettes    Smokeless tobacco: Never   Substance Use Topics    Alcohol use: Not Currently    Drug use: Never       Review of Systems   Gastrointestinal:  Positive for abdominal pain and vomiting.       Physical Exam   BP (!) 147/84   Pulse 108   Temp 98.5 °F (36.9 °C)   Resp 16   Ht 5' 10" (1.778 m)   Wt 63.5 kg (140 lb)   SpO2 100%   BMI 20.09 kg/m²     Physical Exam    ED Course           "

## 2024-03-05 NOTE — ASSESSMENT & PLAN NOTE
Patient with history of known MW tear noted on previous EGD 9/2023. Patient says he has a small amount of blood in his vomit. He was told he didn't need to take protonix, so he has not taken it in a few months. Will restart PPI and give a dose of carafate for burning pain. Patient reports he has not vomited in several hours and is tolerating sips of water currently.     - restart protonix  - trend h/h

## 2024-03-05 NOTE — HPI
Tasia Cardona is a 23 y.o. male with a PMHx of alcohol use disorder, HIV, chronic pancreatitis, asthma, splenic vein thrombosis, anemia, and polycythemia vera who presents to the ED with complaints of abdominal pain, nausea, and vomiting x3 days. States that this feels similar to his pancreatitis flares. Patient reports multiple episodes of bilious, bloody tinged emesis for the last 3 days.Patient specifies abdominal pain is in his LUQ and LLQ that intermittently radiates to shoulder. Patient also endorses chest pain he describes as cramping and burning which he associates with the increased strain of emesis. Patient states that he fainted while walking his dog last evening. Patient denies any dizziness or visual field disturbances preceding fall but endorses increased fatigue x 3-4 days and does not believe he hit his head. Patient with scant bruising to bilateral knees and elbow. Patient states nausea, abd pain, and fatigue are mildly improved since arrival. He denies fever/chills, SOB, cough, or dysuria. Patient with prior alcohol use disorder diagnosis. States last drink was 1 glass of red wine 3-4 days PTA.Patient denies any recent binge drinking episodes.    In the ED, patient tachycardic otherwise vitals stable, afebrile. CBC with stable anemia. Bicarb 17. Anion gap 18. Alk phos 136. ALT 59. Lipase 31. Lactic 2.3>0.7. PETH pending. ETOH 27. EKG with ST, rate 115, no acute ischemic changes. UA pending. Xray R elbow with no fracture or dislocation. No bone destruction identified. Xray misti knees with no fracture or dislocation. No bone destruction identified. Slight soft tissue swelling noted anterior to the right knee joint. CTH with no acute hemorrhage or major vascular territory infarct. Possible subtle sulcation abnormality with calcification in the right frontal lobe, similar to priors.  Appearance suggestive of a cortical malformation/developmental anomaly or possible remote insult. The patient received  valium, droperidol x2, morphine x2, dilaudid, 2L NS, MVI, and thiamine.

## 2024-03-05 NOTE — ASSESSMENT & PLAN NOTE
Hx of EtOH use disorder. Reports cutting back on EtOH consumption since 9/2023 but still drinks rum or wine occasionally but unable to quantify how much or how often. States his last drink was a glass of wine about 3 days ago. Denies hx of ETOH withdrawal.  -EtOH level 27, UDS pending.  -PETH in process.  -Nursing to perform CIWA q shift although low concern for withdrawal  -Initiate folic acid, thiamine, and MVI.    -Discussed the dangers of continued ETOH use with Pt and apparent systemic effects of his abuse.

## 2024-03-05 NOTE — SUBJECTIVE & OBJECTIVE
Past Medical History:   Diagnosis Date    Acute necrotizing pancreatitis 09/20/2023    Alcohol use disorder 10/05/2023    Asthma     Hepatitis C antibody positive in blood 09/18/2023 9/2023 PCR negative    HIV infection 10/2021    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       Past Surgical History:   Procedure Laterality Date    ENDOSCOPIC ULTRASOUND OF UPPER GASTROINTESTINAL TRACT N/A 10/23/2023    Procedure: ULTRASOUND, UPPER GI TRACT, ENDOSCOPIC;  Surgeon: Emil Villatoro MD;  [facility]  Service: Endoscopy;  Laterality: N/A;    ERCP N/A 10/23/2023    Procedure: ERCP (ENDOSCOPIC RETROGRADE CHOLANGIOPANCREATOGRAPHY);  Surgeon: Emil Villatoro MD;  [facility]  Service: Endoscopy;  Laterality: N/A;    ESOPHAGOGASTRODUODENOSCOPY N/A 9/18/2023    Procedure: EGD (ESOPHAGOGASTRODUODENOSCOPY);  Surgeon: Kg Cleaning MD;  [facility]  Service: Endoscopy;  Laterality: N/A;       Review of patient's allergies indicates:   Allergen Reactions    Electra Swelling    Pecan nut Swelling    Penicillins Hives     Tolerates cephalosporins    Tolerated piperacillin/tazobactam 11/2023    Soy Other (See Comments)    Sulfa (sulfonamide antibiotics) Hives       No current facility-administered medications on file prior to encounter.     Current Outpatient Medications on File Prior to Encounter   Medication Sig    muzewuvfy-bqtgfwwj-duanghf ala (BIKTARVY) -25 mg (25 kg or greater) Take 1 tablet by mouth once daily.    folic acid (FOLVITE) 1 MG tablet Take 1 tablet (1 mg total) by mouth once daily.    HYDROmorphone (DILAUDID) 8 MG tablet Take 6 mg by mouth every 4 (four) hours as needed for Pain.    omeprazole (PRILOSEC) 20 MG capsule Take 1 capsule (20 mg total) by  mouth once daily.    oxyCODONE (ROXICODONE) 5 MG immediate release tablet Take 5 mg by mouth every 6 (six) hours as needed.    pantoprazole (PROTONIX) 40 MG tablet Take 1 tablet (40 mg total) by mouth 2 (two) times daily.    pregabalin (LYRICA) 150 MG capsule Take 1 capsule (150 mg total) by mouth 2 (two) times daily. for 14 days    promethazine (PHENERGAN) 25 MG suppository Place 1 suppository (25 mg total) rectally every 6 (six) hours as needed for Nausea.    promethazine (PHENERGAN) 25 MG suppository Place 1 suppository (25 mg total) rectally every 6 (six) hours as needed for Nausea.    senna (SENOKOT) 8.6 mg tablet Take 1 tablet by mouth every evening.    senna-docusate 8.6-50 mg (PERICOLACE) 8.6-50 mg per tablet Take 2 tablets by mouth 2 (two) times daily as needed for Constipation.     Family History       Problem Relation (Age of Onset)    Breast cancer Maternal Grandmother    Cancer Mother, Brother    No Known Problems Father    Ovarian cancer Mother    Pancreatitis Mother          Tobacco Use    Smoking status: Former     Types: Cigarettes    Smokeless tobacco: Never   Substance and Sexual Activity    Alcohol use: Not Currently    Drug use: Never    Sexual activity: Not on file     Review of Systems   Constitutional:  Positive for fatigue. Negative for chills, diaphoresis and fever.   HENT:  Negative for congestion, postnasal drip and rhinorrhea.    Eyes:  Negative for photophobia.   Respiratory:  Negative for cough, shortness of breath and wheezing.    Cardiovascular:  Positive for chest pain (burning/cramping). Negative for palpitations and leg swelling.   Gastrointestinal:  Positive for abdominal pain, nausea and vomiting. Negative for abdominal distention, blood in stool and diarrhea.   Genitourinary:  Negative for dysuria, frequency and hematuria.   Musculoskeletal:  Negative for back pain, gait problem and myalgias.   Skin:  Positive for wound. Negative for pallor and rash.   Neurological:  Positive  for syncope and weakness (generalized). Negative for dizziness, facial asymmetry, speech difficulty, numbness and headaches.   Psychiatric/Behavioral:  Negative for confusion and hallucinations. The patient is not nervous/anxious.      Objective:     Vital Signs (Most Recent):  Temp: 98.5 °F (36.9 °C) (03/04/24 1652)  Pulse: 108 (03/04/24 1652)  Resp: 16 (03/04/24 1652)  BP: (!) 147/84 (03/04/24 1652)  SpO2: 100 % (03/04/24 1652) Vital Signs (24h Range):  Temp:  [98.3 °F (36.8 °C)-98.6 °F (37 °C)] 98.5 °F (36.9 °C)  Pulse:  [108-118] 108  Resp:  [16-20] 16  SpO2:  [99 %-100 %] 100 %  BP: (128-147)/(51-84) 147/84     Weight: 63.5 kg (140 lb)  Body mass index is 20.09 kg/m².     Physical Exam  Vitals and nursing note reviewed.   Constitutional:       General: He is not in acute distress.     Appearance: He is not toxic-appearing or diaphoretic.   HENT:      Head: Normocephalic and atraumatic.      Nose: Nose normal.      Mouth/Throat:      Mouth: Mucous membranes are moist.   Eyes:      Pupils: Pupils are equal, round, and reactive to light.   Cardiovascular:      Rate and Rhythm: Normal rate and regular rhythm.      Pulses: Normal pulses.   Pulmonary:      Effort: Pulmonary effort is normal. No respiratory distress.      Breath sounds: No wheezing, rhonchi or rales.   Abdominal:      General: Bowel sounds are normal. There is no distension.      Palpations: Abdomen is soft.      Tenderness: There is abdominal tenderness in the epigastric area, left upper quadrant and left lower quadrant. There is no guarding.   Musculoskeletal:         General: No swelling, tenderness or deformity. Normal range of motion.      Right elbow: Normal range of motion.      Cervical back: Normal range of motion.      Right knee: Normal range of motion.      Left knee: Normal range of motion.      Comments: Bruising and abrasions to misti knees and R elbow   Skin:     General: Skin is warm and dry.      Capillary Refill: Capillary refill  takes less than 2 seconds.   Neurological:      General: No focal deficit present.      Mental Status: He is alert and oriented to person, place, and time.      Sensory: No sensory deficit.      Motor: No weakness.   Psychiatric:         Mood and Affect: Mood normal.         Behavior: Behavior normal.              CRANIAL NERVES     CN III, IV, VI   Pupils are equal, round, and reactive to light.       Significant Labs: All pertinent labs within the past 24 hours have been reviewed.  CBC:   Recent Labs   Lab 03/04/24  1145 03/04/24  1153   WBC 10.14  --    HGB 11.0*  --    HCT 36.9* 39     --      CMP:   Recent Labs   Lab 03/04/24  1145      K 3.5      CO2 17*   GLU 80   BUN 9   CREATININE 0.7   CALCIUM 9.3   PROT 8.2   ALBUMIN 4.1   BILITOT 0.4   ALKPHOS 136*   AST 59*   ALT 14   ANIONGAP 18*     Lactic Acid:   Recent Labs   Lab 03/04/24  1145 03/04/24  1514   LACTATE 2.3* 0.7     Lipase:   Recent Labs   Lab 03/04/24  1145   LIPASE 31       Significant Imaging: I have reviewed all pertinent imaging results/findings within the past 24 hours.  Imaging Results               CT Head Without Contrast (Final result)  Result time 03/04/24 17:02:35      Final result by Fritz Son MD (03/04/24 17:02:35)                   Impression:      No acute hemorrhage or major vascular territory infarct.    Possible subtle sulcation abnormality with calcification in the right frontal lobe, similar to priors.  Appearance suggestive of a cortical malformation/developmental anomaly or possible remote insult.  MRI may be helpful for further characterization if warranted clinically.    This report was flagged in Epic as abnormal.    Electronically signed by resident: Darien Sprague  Date:    03/04/2024  Time:    16:44    Electronically signed by: Fritz Son MD  Date:    03/04/2024  Time:    17:02               Narrative:    EXAMINATION:  CT HEAD WITHOUT CONTRAST    CLINICAL HISTORY:  Syncope,  recurrent;    TECHNIQUE:  Low dose axial CT images obtained throughout the head without the use of intravenous contrast.  Axial, sagittal and coronal reconstructions were performed.    COMPARISON:  CT head 12/16/2022    FINDINGS:  Intracranial compartment:    Ventricles and sulci are normal in size without evidence of hydrocephalus.    Apparent deep sulcus in the right frontal lobe (series 601, image 60; series 2, image 17).  Possible small focus underlying white matter loss with punctate calcification.  Overall appearance unchanged from prior.  No new parenchymal hemorrhage, edema, mass effect or major vascular distribution infarct.    No extra-axial blood or fluid collections.    Skull/extracranial contents (limited evaluation):    No displaced calvarial fracture.    The mastoid air cells are clear.  Patchy mucosal thickening of the ethmoid air cells.  Remainder of the visualized paranasal sinuses are essentially clear.                                       X-Ray Knee 3 View Bilateral (Final result)  Result time 03/04/24 13:27:57   Procedure changed from X-Ray Knee 3 View Right     Final result by Abbe Meza MD (03/04/24 13:27:57)                   Impression:      See above      Electronically signed by: Abbe Meza MD  Date:    03/04/2024  Time:    13:27               Narrative:    EXAMINATION:  XR KNEE 3 VIEW BILATERAL    CLINICAL HISTORY:  Fall; Unspecified fall, initial encounter    TECHNIQUE:  AP, lateral, and Merchant views of both knees were performed.    COMPARISON:  None    FINDINGS:  No fracture or dislocation.  No bone destruction identified.  Slight soft tissue swelling noted anterior to the right knee joint.                                       X-Ray Elbow Complete Right (Final result)  Result time 03/04/24 13:35:52      Final result by Abbe Meza MD (03/04/24 13:35:52)                   Impression:      See above      Electronically signed by: Abbe Meza  MD  Date:    03/04/2024  Time:    13:35               Narrative:    EXAMINATION:  XR ELBOW COMPLETE 3 VIEW RIGHT    CLINICAL HISTORY:  . Unspecified fall, initial encounter    TECHNIQUE:  AP, lateral, and oblique views of the right elbow were performed.    COMPARISON:  None    FINDINGS:  No fracture or dislocation.  No bone destruction identified

## 2024-03-05 NOTE — ASSESSMENT & PLAN NOTE
-Afebrile, no leukocytosis.  -Lipase 31  -Lactic 2.3>0.7 s/p IVF.  -CT AP in process.  -Continue IVF.  -CLD for now, advance as tolerate.  -PRN pain and nausea control.  -Encouraged EtOH cessation.

## 2024-03-05 NOTE — H&P
[facility] - Emergency Dept  [facility] Medicine  History & Physical    Patient Name: Tasia Cardona  MRN: [id number]  Patient Class: IP- Inpatient  Admission Date: 3/4/2024  Attending Physician: Nam Cleaning MD   Primary Care Provider: Pina Jones NP         Patient information was obtained from patient, past medical records, and ER records.     Subjective:     Principal Problem:Acute on chronic pancreatitis    Chief Complaint:   Chief Complaint   Patient presents with    Abdominal Pain    Vomiting     Vomited some blood with bile hx pancreatitis, fainted last night pain to L arm        HPI: Tasia Cardona is a 23 y.o. male with a PMHx of alcohol use disorder, HIV, chronic pancreatitis, asthma, splenic vein thrombosis, anemia, and polycythemia vera who presents to the ED with complaints of abdominal pain, nausea, and vomiting x3 days. States that this feels similar to his pancreatitis flares. Patient reports multiple episodes of bilious, bloody tinged emesis for the last 3 days.Patient specifies abdominal pain is in his LUQ and LLQ that intermittently radiates to shoulder. Patient also endorses chest pain he describes as cramping and burning which he associates with the increased strain of emesis. Patient states that he fainted while walking his dog last evening. Patient denies any dizziness or visual field disturbances preceding fall but endorses increased fatigue x 3-4 days and does not believe he hit his head. Patient with scant bruising to bilateral knees and elbow. Patient states nausea, abd pain, and fatigue are mildly improved since arrival. He denies fever/chills, SOB, cough, or dysuria. Patient with prior alcohol use disorder diagnosis. States last drink was 1 glass of red wine 3-4 days PTA.Patient denies any recent binge drinking episodes.    In the ED, patient tachycardic otherwise vitals stable, afebrile. CBC with stable anemia. Bicarb 17. Anion gap 18. Alk phos 136. ALT 59. Lipase 31. Lactic  2.3>0.7. PETH pending. ETOH 27. EKG with ST, rate 115, no acute ischemic changes. UA pending. Xray R elbow with no fracture or dislocation. No bone destruction identified. Xray misti knees with no fracture or dislocation. No bone destruction identified. Slight soft tissue swelling noted anterior to the right knee joint. CTH with no acute hemorrhage or major vascular territory infarct. Possible subtle sulcation abnormality with calcification in the right frontal lobe, similar to priors.  Appearance suggestive of a cortical malformation/developmental anomaly or possible remote insult. The patient received valium, droperidol x2, morphine x2, dilaudid, 2L NS, MVI, and thiamine.    Past Medical History:   Diagnosis Date    Acute necrotizing pancreatitis 09/20/2023    Alcohol use disorder 10/05/2023    Asthma     Hepatitis C antibody positive in blood 09/18/2023 9/2023 PCR negative    HIV infection 10/2021    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       Past Surgical History:   Procedure Laterality Date    ENDOSCOPIC ULTRASOUND OF UPPER GASTROINTESTINAL TRACT N/A 10/23/2023    Procedure: ULTRASOUND, UPPER GI TRACT, ENDOSCOPIC;  Surgeon: Emil Villatoro MD;  Location: [address]);  Service: Endoscopy;  Laterality: N/A;    ERCP N/A 10/23/2023    Procedure: ERCP (ENDOSCOPIC RETROGRADE CHOLANGIOPANCREATOGRAPHY);  Surgeon: Emil Villatoro MD;  Location: [address]);  Service: Endoscopy;  Laterality: N/A;    ESOPHAGOGASTRODUODENOSCOPY N/A 9/18/2023    Procedure: EGD (ESOPHAGOGASTRODUODENOSCOPY);  Surgeon: Kg Cleaning MD;  Location: [facility] ([address] [place] [address]);  Service: Endoscopy;  Laterality: N/A;       Review of patient's allergies indicates:   Allergen Reactions    [place] Swelling    Pecan nut Swelling     Penicillins Hives     Tolerates cephalosporins    Tolerated piperacillin/tazobactam 11/2023    Soy Other (See Comments)    Sulfa (sulfonamide antibiotics) Hives       No current facility-administered medications on file prior to encounter.     Current Outpatient Medications on File Prior to Encounter   Medication Sig    onkenqprj-besaqqdr-uhfzvot ala (BIKTARVY) -25 mg (25 kg or greater) Take 1 tablet by mouth once daily.    folic acid (FOLVITE) 1 MG tablet Take 1 tablet (1 mg total) by mouth once daily.    HYDROmorphone (DILAUDID) 8 MG tablet Take 6 mg by mouth every 4 (four) hours as needed for Pain.    omeprazole (PRILOSEC) 20 MG capsule Take 1 capsule (20 mg total) by mouth once daily.    oxyCODONE (ROXICODONE) 5 MG immediate release tablet Take 5 mg by mouth every 6 (six) hours as needed.    pantoprazole (PROTONIX) 40 MG tablet Take 1 tablet (40 mg total) by mouth 2 (two) times daily.    pregabalin (LYRICA) 150 MG capsule Take 1 capsule (150 mg total) by mouth 2 (two) times daily. for 14 days    promethazine (PHENERGAN) 25 MG suppository Place 1 suppository (25 mg total) rectally every 6 (six) hours as needed for Nausea.    promethazine (PHENERGAN) 25 MG suppository Place 1 suppository (25 mg total) rectally every 6 (six) hours as needed for Nausea.    senna (SENOKOT) 8.6 mg tablet Take 1 tablet by mouth every evening.    senna-docusate 8.6-50 mg (PERICOLACE) 8.6-50 mg per tablet Take 2 tablets by mouth 2 (two) times daily as needed for Constipation.     Family History       Problem Relation (Age of Onset)    Breast cancer Maternal Grandmother    Cancer Mother, Brother    No Known Problems Father    Ovarian cancer Mother    Pancreatitis Mother          Tobacco Use    Smoking status: Former     Types: Cigarettes    Smokeless tobacco: Never   Substance and Sexual Activity    Alcohol use: Not Currently    Drug use: Never    Sexual activity: Not on file     Review of Systems   Constitutional:  Positive for  fatigue. Negative for chills, diaphoresis and fever.   HENT:  Negative for congestion, postnasal drip and rhinorrhea.    Eyes:  Negative for photophobia.   Respiratory:  Negative for cough, shortness of breath and wheezing.    Cardiovascular:  Positive for chest pain (burning/cramping). Negative for palpitations and leg swelling.   Gastrointestinal:  Positive for abdominal pain, nausea and vomiting. Negative for abdominal distention, blood in stool and diarrhea.   Genitourinary:  Negative for dysuria, frequency and hematuria.   Musculoskeletal:  Negative for back pain, gait problem and myalgias.   Skin:  Positive for wound. Negative for pallor and rash.   Neurological:  Positive for syncope and weakness (generalized). Negative for dizziness, facial asymmetry, speech difficulty, numbness and headaches.   Psychiatric/Behavioral:  Negative for confusion and hallucinations. The patient is not nervous/anxious.      Objective:     Vital Signs (Most Recent):  Temp: 98.5 °F (36.9 °C) (03/04/24 1652)  Pulse: 108 (03/04/24 1652)  Resp: 16 (03/04/24 1652)  BP: (!) 147/84 (03/04/24 1652)  SpO2: 100 % (03/04/24 1652) Vital Signs (24h Range):  Temp:  [98.3 °F (36.8 °C)-98.6 °F (37 °C)] 98.5 °F (36.9 °C)  Pulse:  [108-118] 108  Resp:  [16-20] 16  SpO2:  [99 %-100 %] 100 %  BP: (128-147)/(51-84) 147/84     Weight: 63.5 kg (140 lb)  Body mass index is 20.09 kg/m².     Physical Exam  Vitals and nursing note reviewed.   Constitutional:       General: He is not in acute distress.     Appearance: He is not toxic-appearing or diaphoretic.   HENT:      Head: Normocephalic and atraumatic.      Nose: Nose normal.      Mouth/Throat:      Mouth: Mucous membranes are moist.   Eyes:      Pupils: Pupils are equal, round, and reactive to light.   Cardiovascular:      Rate and Rhythm: Normal rate and regular rhythm.      Pulses: Normal pulses.   Pulmonary:      Effort: Pulmonary effort is normal. No respiratory distress.      Breath sounds: No  wheezing, rhonchi or rales.   Abdominal:      General: Bowel sounds are normal. There is no distension.      Palpations: Abdomen is soft.      Tenderness: There is abdominal tenderness in the epigastric area, left upper quadrant and left lower quadrant. There is no guarding.   Musculoskeletal:         General: No swelling, tenderness or deformity. Normal range of motion.      Right elbow: Normal range of motion.      Cervical back: Normal range of motion.      Right knee: Normal range of motion.      Left knee: Normal range of motion.      Comments: Bruising and abrasions to misti knees and R elbow   Skin:     General: Skin is warm and dry.      Capillary Refill: Capillary refill takes less than 2 seconds.   Neurological:      General: No focal deficit present.      Mental Status: He is alert and oriented to person, place, and time.      Sensory: No sensory deficit.      Motor: No weakness.   Psychiatric:         Mood and Affect: Mood normal.         Behavior: Behavior normal.              CRANIAL NERVES     CN III, IV, VI   Pupils are equal, round, and reactive to light.       Significant Labs: All pertinent labs within the past 24 hours have been reviewed.  CBC:   Recent Labs   Lab 03/04/24  1145 03/04/24  1153   WBC 10.14  --    HGB 11.0*  --    HCT 36.9* 39     --      CMP:   Recent Labs   Lab 03/04/24  1145      K 3.5      CO2 17*   GLU 80   BUN 9   CREATININE 0.7   CALCIUM 9.3   PROT 8.2   ALBUMIN 4.1   BILITOT 0.4   ALKPHOS 136*   AST 59*   ALT 14   ANIONGAP 18*     Lactic Acid:   Recent Labs   Lab 03/04/24  1145 03/04/24  1514   LACTATE 2.3* 0.7     Lipase:   Recent Labs   Lab 03/04/24  1145   LIPASE 31       Significant Imaging: I have reviewed all pertinent imaging results/findings within the past 24 hours.  Imaging Results               CT Head Without Contrast (Final result)  Result time 03/04/24 17:02:35      Final result by Fritz Son MD (03/04/24 17:02:35)                    Impression:      No acute hemorrhage or major vascular territory infarct.    Possible subtle sulcation abnormality with calcification in the right frontal lobe, similar to priors.  Appearance suggestive of a cortical malformation/developmental anomaly or possible remote insult.  MRI may be helpful for further characterization if warranted clinically.    This report was flagged in Epic as abnormal.    Electronically signed by resident: Darien Sprague  Date:    03/04/2024  Time:    16:44    Electronically signed by: Fritz Son MD  Date:    03/04/2024  Time:    17:02               Narrative:    EXAMINATION:  CT HEAD WITHOUT CONTRAST    CLINICAL HISTORY:  Syncope, recurrent;    TECHNIQUE:  Low dose axial CT images obtained throughout the head without the use of intravenous contrast.  Axial, sagittal and coronal reconstructions were performed.    COMPARISON:  CT head 12/16/2022    FINDINGS:  Intracranial compartment:    Ventricles and sulci are normal in size without evidence of hydrocephalus.    Apparent deep sulcus in the right frontal lobe (series 601, image 60; series 2, image 17).  Possible small focus underlying white matter loss with punctate calcification.  Overall appearance unchanged from prior.  No new parenchymal hemorrhage, edema, mass effect or major vascular distribution infarct.    No extra-axial blood or fluid collections.    Skull/extracranial contents (limited evaluation):    No displaced calvarial fracture.    The mastoid air cells are clear.  Patchy mucosal thickening of the ethmoid air cells.  Remainder of the visualized paranasal sinuses are essentially clear.                                       X-Ray Knee 3 View Bilateral (Final result)  Result time 03/04/24 13:27:57   Procedure changed from X-Ray Knee 3 View Right     Final result by Abbe Meza MD (03/04/24 13:27:57)                   Impression:      See above      Electronically signed by: Abbe Meza  MD  Date:    03/04/2024  Time:    13:27               Narrative:    EXAMINATION:  XR KNEE 3 VIEW BILATERAL    CLINICAL HISTORY:  Fall; Unspecified fall, initial encounter    TECHNIQUE:  AP, lateral, and Merchant views of both knees were performed.    COMPARISON:  None    FINDINGS:  No fracture or dislocation.  No bone destruction identified.  Slight soft tissue swelling noted anterior to the right knee joint.                                       X-Ray Elbow Complete Right (Final result)  Result time 03/04/24 13:35:52      Final result by Abbe Meza MD (03/04/24 13:35:52)                   Impression:      See above      Electronically signed by: Abbe Meza MD  Date:    03/04/2024  Time:    13:35               Narrative:    EXAMINATION:  XR ELBOW COMPLETE 3 VIEW RIGHT    CLINICAL HISTORY:  . Unspecified fall, initial encounter    TECHNIQUE:  AP, lateral, and oblique views of the right elbow were performed.    COMPARISON:  None    FINDINGS:  No fracture or dislocation.  No bone destruction identified                                      Assessment/Plan:     * Acute on chronic pancreatitis  -Afebrile, no leukocytosis.  -Lipase 31  -Lactic 2.3>0.7 s/p IVF.  -CT AP in process.  -Continue IVF.  -CLD for now, advance as tolerate.  -PRN pain and nausea control.  -Encouraged EtOH cessation.    Hematemesis  Patient with history of known MW tear noted on previous EGD 9/2023. Patient says he has a small amount of blood in his vomit. He was told he didn't need to take protonix, so he has not taken it in a few months. Will restart PPI and give a dose of carafate for burning pain. Patient reports he has not vomited in several hours and is tolerating sips of water currently.     - restart protonix  - trend h/h    Syncope  Patient reports passing out while walking his dog last night. Endorses increased fatigue and generalized weakness due to significant nausea, vomiting, and decreased PO intake. Denies any significant chest  pain, SOB, lightheadedness, or vision changes prior to the episode. Unsure if he struck his head but no obvious signs of head trauma on exam   Likely related to significant dehydration.   -Labs with high anion gap metabolic acidosis. Lactic 2.3>0.7. EtOH 27  -UA/UDS pending.  -EKG with ST, no acute ischemic changes  -Check orthostatics.  -CTH with no acute hemorrhage or major vascular territory infarct. Possible subtle sulcation abnormality with calcification in the right frontal lobe, similar to priors.  -Continue IVF.  -Tele-monitoring.   -Fall precautions.     High anion gap metabolic acidosis  -Bicarb 17, anion gap 18 on admit likely due to starvation ketoacidosis vs alcoholic ketoacidosis although patient denies significant EtOH consumption.  -Continue IVF.  -CLD.  -PRN antiemetics.  -Monitor on labs.    Chronic pain syndrome  -History noted.  -Currently on IV opiates in the setting of acute on chronic pancreatitis.    Pancreatic pseudocyst/cyst  -Chronic issue.  -CT AP in process.    Alcohol use disorder  Hx of EtOH use disorder. Reports cutting back on EtOH consumption since 9/2023 but still drinks rum or wine occasionally but unable to quantify how much or how often. States his last drink was a glass of wine about 3 days ago. Denies hx of ETOH withdrawal.  -EtOH level 27, UDS pending.  -PETH in process.  -Nursing to perform CIWA q shift although low concern for withdrawal  -Initiate folic acid, thiamine, and MVI.    -Discussed the dangers of continued ETOH use with Pt and apparent systemic effects of his abuse.      Splenic vein thrombosis  Patient has history of splenic vein thrombosis with gastric varices (11/2023 on CTA) in the setting of polycythemia vera and chronic pancreatitis. He is not currently on anticoagulation.     Mild intermittent asthma without complication  -No s/s of acute exacerbation.  -PRN albuterol inhaler.    Polycythemia vera  Currently stable, has not required phlebotomy for several  months due to hematocrit within normal limits.   -Follows outpatient with hematology  -Not on AC due to hx of rectal bleeding     HIV infection  Patient was diagnosed with HIV 2 years ago and has been on anti retroviral therapy. He is sexually active with male partners. His most recent CD4 count was 1080 (10/2023).     - Continue home biktarvy       VTE Risk Mitigation (From admission, onward)           Ordered     IP VTE HIGH RISK PATIENT  Once         03/04/24 1825     Place sequential compression device  Until discontinued         03/04/24 1825                                    Zahra Barrow NP  Department of Hospital Medicine  [facility] - Emergency Dept

## 2024-03-05 NOTE — ASSESSMENT & PLAN NOTE
Currently stable, has not required phlebotomy for several months due to hematocrit within normal limits.   -Follows outpatient with hematology  -Not on AC due to hx of rectal bleeding

## 2024-03-06 LAB
ALBUMIN SERPL BCP-MCNC: 2.8 G/DL (ref 3.5–5.2)
ALP SERPL-CCNC: 87 U/L (ref 55–135)
ALT SERPL W/O P-5'-P-CCNC: 8 U/L (ref 10–44)
ANION GAP SERPL CALC-SCNC: 8 MMOL/L (ref 8–16)
AST SERPL-CCNC: 41 U/L (ref 10–40)
BASOPHILS # BLD AUTO: 0.03 K/UL (ref 0–0.2)
BASOPHILS NFR BLD: 0.7 % (ref 0–1.9)
BILIRUB SERPL-MCNC: 0.2 MG/DL (ref 0.1–1)
BUN SERPL-MCNC: 4 MG/DL (ref 6–20)
CALCIUM SERPL-MCNC: 8.3 MG/DL (ref 8.7–10.5)
CHLORIDE SERPL-SCNC: 104 MMOL/L (ref 95–110)
CO2 SERPL-SCNC: 27 MMOL/L (ref 23–29)
CREAT SERPL-MCNC: 0.7 MG/DL (ref 0.5–1.4)
DIFFERENTIAL METHOD BLD: ABNORMAL
EOSINOPHIL # BLD AUTO: 0.2 K/UL (ref 0–0.5)
EOSINOPHIL NFR BLD: 3.6 % (ref 0–8)
ERYTHROCYTE [DISTWIDTH] IN BLOOD BY AUTOMATED COUNT: 17 % (ref 11.5–14.5)
EST. GFR  (NO RACE VARIABLE): >60 ML/MIN/1.73 M^2
GLUCOSE SERPL-MCNC: 94 MG/DL (ref 70–110)
HCT VFR BLD AUTO: 32.2 % (ref 40–54)
HGB BLD-MCNC: 9.3 G/DL (ref 14–18)
IMM GRANULOCYTES # BLD AUTO: 0 K/UL (ref 0–0.04)
IMM GRANULOCYTES NFR BLD AUTO: 0 % (ref 0–0.5)
LYMPHOCYTES # BLD AUTO: 1.9 K/UL (ref 1–4.8)
LYMPHOCYTES NFR BLD: 42.2 % (ref 18–48)
MAGNESIUM SERPL-MCNC: 2 MG/DL (ref 1.6–2.6)
MCH RBC QN AUTO: 23 PG (ref 27–31)
MCHC RBC AUTO-ENTMCNC: 28.9 G/DL (ref 32–36)
MCV RBC AUTO: 80 FL (ref 82–98)
MONOCYTES # BLD AUTO: 0.5 K/UL (ref 0.3–1)
MONOCYTES NFR BLD: 10 % (ref 4–15)
NEUTROPHILS # BLD AUTO: 2 K/UL (ref 1.8–7.7)
NEUTROPHILS NFR BLD: 43.5 % (ref 38–73)
NRBC BLD-RTO: 0 /100 WBC
PLATELET # BLD AUTO: 219 K/UL (ref 150–450)
PMV BLD AUTO: 9.6 FL (ref 9.2–12.9)
POTASSIUM SERPL-SCNC: 3.4 MMOL/L (ref 3.5–5.1)
PROT SERPL-MCNC: 5.8 G/DL (ref 6–8.4)
RBC # BLD AUTO: 4.05 M/UL (ref 4.6–6.2)
SODIUM SERPL-SCNC: 139 MMOL/L (ref 136–145)
WBC # BLD AUTO: 4.48 K/UL (ref 3.9–12.7)

## 2024-03-06 PROCEDURE — 36415 COLL VENOUS BLD VENIPUNCTURE: CPT | Performed by: NURSE PRACTITIONER

## 2024-03-06 PROCEDURE — 25000003 PHARM REV CODE 250: Performed by: HOSPITALIST

## 2024-03-06 PROCEDURE — 25000003 PHARM REV CODE 250

## 2024-03-06 PROCEDURE — 63600175 PHARM REV CODE 636 W HCPCS: Performed by: STUDENT IN AN ORGANIZED HEALTH CARE EDUCATION/TRAINING PROGRAM

## 2024-03-06 PROCEDURE — 63600175 PHARM REV CODE 636 W HCPCS: Performed by: NURSE PRACTITIONER

## 2024-03-06 PROCEDURE — 25000003 PHARM REV CODE 250: Performed by: STUDENT IN AN ORGANIZED HEALTH CARE EDUCATION/TRAINING PROGRAM

## 2024-03-06 PROCEDURE — 83735 ASSAY OF MAGNESIUM: CPT | Performed by: NURSE PRACTITIONER

## 2024-03-06 PROCEDURE — 63600175 PHARM REV CODE 636 W HCPCS: Performed by: HOSPITALIST

## 2024-03-06 PROCEDURE — 21400001 HC TELEMETRY ROOM

## 2024-03-06 PROCEDURE — 80053 COMPREHEN METABOLIC PANEL: CPT | Performed by: NURSE PRACTITIONER

## 2024-03-06 PROCEDURE — 85025 COMPLETE CBC W/AUTO DIFF WBC: CPT | Performed by: NURSE PRACTITIONER

## 2024-03-06 PROCEDURE — 25000003 PHARM REV CODE 250: Performed by: NURSE PRACTITIONER

## 2024-03-06 RX ORDER — HYDROMORPHONE HYDROCHLORIDE 1 MG/ML
1 INJECTION, SOLUTION INTRAMUSCULAR; INTRAVENOUS; SUBCUTANEOUS EVERY 4 HOURS PRN
Status: DISCONTINUED | OUTPATIENT
Start: 2024-03-06 | End: 2024-03-07

## 2024-03-06 RX ORDER — LORAZEPAM 0.5 MG/1
1 TABLET ORAL ONCE
Status: COMPLETED | OUTPATIENT
Start: 2024-03-06 | End: 2024-03-06

## 2024-03-06 RX ORDER — HYDROMORPHONE HYDROCHLORIDE 1 MG/ML
0.5 INJECTION, SOLUTION INTRAMUSCULAR; INTRAVENOUS; SUBCUTANEOUS EVERY 4 HOURS PRN
Status: DISCONTINUED | OUTPATIENT
Start: 2024-03-06 | End: 2024-03-07

## 2024-03-06 RX ORDER — HYDROMORPHONE HYDROCHLORIDE 1 MG/ML
1 INJECTION, SOLUTION INTRAMUSCULAR; INTRAVENOUS; SUBCUTANEOUS EVERY 6 HOURS PRN
Status: DISCONTINUED | OUTPATIENT
Start: 2024-03-06 | End: 2024-03-06

## 2024-03-06 RX ADMIN — SENNOSIDES AND DOCUSATE SODIUM 2 TABLET: 8.6; 5 TABLET ORAL at 09:03

## 2024-03-06 RX ADMIN — SENNOSIDES AND DOCUSATE SODIUM 2 TABLET: 8.6; 5 TABLET ORAL at 10:03

## 2024-03-06 RX ADMIN — BICTEGRAVIR SODIUM, EMTRICITABINE, AND TENOFOVIR ALAFENAMIDE FUMARATE 1 TABLET: 50; 200; 25 TABLET ORAL at 09:03

## 2024-03-06 RX ADMIN — FOLIC ACID 1 MG: 1 TABLET ORAL at 09:03

## 2024-03-06 RX ADMIN — PANTOPRAZOLE SODIUM 40 MG: 40 TABLET, DELAYED RELEASE ORAL at 09:03

## 2024-03-06 RX ADMIN — THERA TABS 1 TABLET: TAB at 09:03

## 2024-03-06 RX ADMIN — OXYCODONE HYDROCHLORIDE 10 MG: 10 TABLET ORAL at 05:03

## 2024-03-06 RX ADMIN — OXYCODONE HYDROCHLORIDE 10 MG: 10 TABLET ORAL at 07:03

## 2024-03-06 RX ADMIN — PROCHLORPERAZINE EDISYLATE 5 MG: 5 INJECTION INTRAMUSCULAR; INTRAVENOUS at 10:03

## 2024-03-06 RX ADMIN — PREGABALIN 75 MG: 75 CAPSULE ORAL at 09:03

## 2024-03-06 RX ADMIN — Medication 100 MG: at 09:03

## 2024-03-06 RX ADMIN — HYDROMORPHONE HYDROCHLORIDE 1 MG: 1 INJECTION, SOLUTION INTRAMUSCULAR; INTRAVENOUS; SUBCUTANEOUS at 01:03

## 2024-03-06 RX ADMIN — SUCRALFATE 1 G: 1 SUSPENSION ORAL at 05:03

## 2024-03-06 RX ADMIN — HYDROMORPHONE HYDROCHLORIDE 0.5 MG: 1 INJECTION, SOLUTION INTRAMUSCULAR; INTRAVENOUS; SUBCUTANEOUS at 08:03

## 2024-03-06 RX ADMIN — HYDROMORPHONE HYDROCHLORIDE 1 MG: 1 INJECTION, SOLUTION INTRAMUSCULAR; INTRAVENOUS; SUBCUTANEOUS at 10:03

## 2024-03-06 RX ADMIN — HYDROMORPHONE HYDROCHLORIDE 0.5 MG: 1 INJECTION, SOLUTION INTRAMUSCULAR; INTRAVENOUS; SUBCUTANEOUS at 04:03

## 2024-03-06 RX ADMIN — OXYCODONE HYDROCHLORIDE 10 MG: 10 TABLET ORAL at 12:03

## 2024-03-06 RX ADMIN — PROCHLORPERAZINE EDISYLATE 5 MG: 5 INJECTION INTRAMUSCULAR; INTRAVENOUS at 12:03

## 2024-03-06 RX ADMIN — POTASSIUM BICARBONATE 50 MEQ: 978 TABLET, EFFERVESCENT ORAL at 10:03

## 2024-03-06 RX ADMIN — Medication 6 MG: at 10:03

## 2024-03-06 RX ADMIN — SUCRALFATE 1 G: 1 SUSPENSION ORAL at 12:03

## 2024-03-06 RX ADMIN — METHOCARBAMOL 500 MG: 500 TABLET ORAL at 09:03

## 2024-03-06 RX ADMIN — OXYCODONE HYDROCHLORIDE 10 MG: 10 TABLET ORAL at 09:03

## 2024-03-06 RX ADMIN — SODIUM CHLORIDE, POTASSIUM CHLORIDE, SODIUM LACTATE AND CALCIUM CHLORIDE: 600; 310; 30; 20 INJECTION, SOLUTION INTRAVENOUS at 12:03

## 2024-03-06 RX ADMIN — LORAZEPAM 1 MG: 0.5 TABLET ORAL at 09:03

## 2024-03-06 RX ADMIN — HYDROMORPHONE HYDROCHLORIDE 1 MG: 1 INJECTION, SOLUTION INTRAMUSCULAR; INTRAVENOUS; SUBCUTANEOUS at 04:03

## 2024-03-06 RX ADMIN — METHOCARBAMOL 500 MG: 500 TABLET ORAL at 05:03

## 2024-03-06 RX ADMIN — OXYCODONE HYDROCHLORIDE 10 MG: 10 TABLET ORAL at 03:03

## 2024-03-06 RX ADMIN — SODIUM CHLORIDE, POTASSIUM CHLORIDE, SODIUM LACTATE AND CALCIUM CHLORIDE: 600; 310; 30; 20 INJECTION, SOLUTION INTRAVENOUS at 01:03

## 2024-03-06 RX ADMIN — METHOCARBAMOL 500 MG: 500 TABLET ORAL at 12:03

## 2024-03-06 NOTE — ASSESSMENT & PLAN NOTE
-Chronic issue.  -CT AP: Sequela pancreatitis with multiple fluid collections in the upper abdomen, some of which are new/worse and some which have improved from prior study.  New thin walled collection adjacent to the stomach measures up to 4 cm in size.   AES consult, no acute intervention

## 2024-03-06 NOTE — ASSESSMENT & PLAN NOTE
-Afebrile, no leukocytosis.  -Lipase 31  -Lactic 2.3>0.7 s/p IVF.  -CT AP : Sequela pancreatitis with multiple fluid collections in the upper abdomen, some of which are new/worse and some which have improved from prior study.  New thin walled collection adjacent to the stomach measures up to 4 cm in size.   -Continue IVF.  -CLD for now, advance as tolerate.  -PRN pain and nausea control.  -Encouraged EtOH cessation.

## 2024-03-06 NOTE — PROGRESS NOTES
[facility] ([address] Medicine  Progress Note    Patient Name: Tasia Cardona  MRN: [id number]  Patient Class: IP- Inpatient   Admission Date: 3/4/2024  Length of Stay: 1 days  Attending Physician: Nam Cleaning MD  Primary Care Provider: Pina Jones NP        Subjective:     Principal Problem:Acute on chronic pancreatitis        HPI:  Tasia Cardona is a 23 y.o. male with a PMHx of alcohol use disorder, HIV, chronic pancreatitis, asthma, splenic vein thrombosis, anemia, and polycythemia vera who presents to the ED with complaints of abdominal pain, nausea, and vomiting x3 days. States that this feels similar to his pancreatitis flares. Patient reports multiple episodes of bilious, bloody tinged emesis for the last 3 days.Patient specifies abdominal pain is in his LUQ and LLQ that intermittently radiates to shoulder. Patient also endorses chest pain he describes as cramping and burning which he associates with the increased strain of emesis. Patient states that he fainted while walking his dog last evening. Patient denies any dizziness or visual field disturbances preceding fall but endorses increased fatigue x 3-4 days and does not believe he hit his head. Patient with scant bruising to bilateral knees and elbow. Patient states nausea, abd pain, and fatigue are mildly improved since arrival. He denies fever/chills, SOB, cough, or dysuria. Patient with prior alcohol use disorder diagnosis. States last drink was 1 glass of red wine 3-4 days PTA.Patient denies any recent binge drinking episodes.    In the ED, patient tachycardic otherwise vitals stable, afebrile. CBC with stable anemia. Bicarb 17. Anion gap 18. Alk phos 136. ALT 59. Lipase 31. Lactic 2.3>0.7. PETH pending. ETOH 27. EKG with ST, rate 115, no acute ischemic changes. UA pending. Xray R elbow with no fracture or dislocation. No bone destruction identified. Xray misti knees with no fracture or dislocation. No bone  destruction identified. Slight soft tissue swelling noted anterior to the right knee joint. CTH with no acute hemorrhage or major vascular territory infarct. Possible subtle sulcation abnormality with calcification in the right frontal lobe, similar to priors.  Appearance suggestive of a cortical malformation/developmental anomaly or possible remote insult. The patient received valium, droperidol x2, morphine x2, dilaudid, 2L NS, MVI, and thiamine.    Overview/Hospital Course:  Patient admitted for management of pancreatitis.  Patient requesting advancing diet, significant improvement in pain.  Diet advanced to soft diet.  AES consulted for pancreatic cyst.  DS was positive for cocaine, opiates and marijuana.    Interval history:  No overnight events.  Patient reports significant improvement in pain.  Diet advanced.    Review of Systems   Constitutional:  Positive for fatigue. Negative for chills, diaphoresis and fever.   HENT:  Negative for congestion, postnasal drip and rhinorrhea.    Eyes:  Negative for photophobia.   Respiratory:  Negative for cough, shortness of breath and wheezing.    Cardiovascular:  Positive for chest pain (burning/cramping). Negative for palpitations and leg swelling.   Gastrointestinal:  Positive for abdominal pain, nausea and vomiting. Negative for abdominal distention, blood in stool and diarrhea.   Genitourinary:  Negative for dysuria, frequency and hematuria.   Musculoskeletal:  Negative for back pain, gait problem and myalgias.   Skin:  Positive for wound. Negative for pallor and rash.   Neurological:  Positive for syncope and weakness (generalized). Negative for dizziness, facial asymmetry, speech difficulty, numbness and headaches.   Psychiatric/Behavioral:  Negative for confusion and hallucinations. The patient is not nervous/anxious.      Objective:     Vital Signs (Most Recent):  Temp: 99.4 °F (37.4 °C) (03/05/24 2007)  Pulse: 100 (03/05/24 2007)  Resp: 18 (03/05/24 2007)  BP:  129/82 (03/05/24 2007)  SpO2: 96 % (03/05/24 2007) Vital Signs (24h Range):  Temp:  [98.5 °F (36.9 °C)-99.4 °F (37.4 °C)] 99.4 °F (37.4 °C)  Pulse:  [] 100  Resp:  [15-20] 18  SpO2:  [96 %-100 %] 96 %  BP: (121-137)/(60-92) 129/82     Weight: 63.5 kg (140 lb)  Body mass index is 20.09 kg/m².     Physical Exam  Vitals and nursing note reviewed.   Constitutional:       General: He is not in acute distress.     Appearance: He is not toxic-appearing or diaphoretic.   HENT:      Head: Normocephalic and atraumatic.      Nose: Nose normal.      Mouth/Throat:      Mouth: Mucous membranes are moist.   Eyes:      Pupils: Pupils are equal, round, and reactive to light.   Cardiovascular:      Rate and Rhythm: Normal rate and regular rhythm.      Pulses: Normal pulses.   Pulmonary:      Effort: Pulmonary effort is normal. No respiratory distress.      Breath sounds: No wheezing, rhonchi or rales.   Abdominal:      General: Bowel sounds are normal. There is no distension.      Palpations: Abdomen is soft.      Tenderness: There is abdominal tenderness in the epigastric area, left upper quadrant and left lower quadrant. There is no guarding.   Musculoskeletal:         General: No swelling, tenderness or deformity. Normal range of motion.      Right elbow: Normal range of motion.      Cervical back: Normal range of motion.      Right knee: Normal range of motion.      Left knee: Normal range of motion.      Comments: Bruising and abrasions to misti knees and R elbow   Skin:     General: Skin is warm and dry.      Capillary Refill: Capillary refill takes less than 2 seconds.   Neurological:      General: No focal deficit present.      Mental Status: He is alert and oriented to person, place, and time.      Sensory: No sensory deficit.      Motor: No weakness.   Psychiatric:         Mood and Affect: Mood normal.         Behavior: Behavior normal.              CRANIAL NERVES     CN III, IV, VI   Pupils are equal, round, and  reactive to light.       Significant Labs: All pertinent labs within the past 24 hours have been reviewed.  CBC:   Recent Labs   Lab 03/04/24  1145 03/04/24  1153 03/05/24  0338   WBC 10.14  --  6.25   HGB 11.0*  --  8.6*   HCT 36.9* 39 29.9*     --  214       CMP:   Recent Labs   Lab 03/04/24  1145 03/05/24  0338    134*   K 3.5 3.2*    105   CO2 17* 20*   GLU 80 90   BUN 9 7   CREATININE 0.7 0.7   CALCIUM 9.3 8.2*   PROT 8.2 6.0   ALBUMIN 4.1 3.0*   BILITOT 0.4 0.5   ALKPHOS 136* 95   AST 59* 54*   ALT 14 9*   ANIONGAP 18* 9       Lactic Acid:   Recent Labs   Lab 03/04/24  1145 03/04/24  1514   LACTATE 2.3* 0.7       Lipase:   Recent Labs   Lab 03/04/24  1145   LIPASE 31         Significant Imaging: I have reviewed all pertinent imaging results/findings within the past 24 hours.  Imaging Results              CT Abdomen Pelvis With IV Contrast NO Oral Contrast (Final result)  Result time 03/04/24 22:15:44      Final result by Reyes Gary MD (03/04/24 22:15:44)                   Impression:      1. Sequela pancreatitis with multiple fluid collections in the upper abdomen, some of which are new/worse and some which have improved from prior study.  New thin walled collection adjacent to the stomach measures up to 4 cm in size.  2. New/worse significant lower esophageal wall thickening.  Suggest correlation for any clinical signs of esophagitis and/or recent vomiting.  3. 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  4. Hepatosplenomegaly and probable hepatic steatosis.  5. Additional findings as detailed above.    Electronically signed by resident: Chrissy Beverly  Date:    03/04/2024  Time:    20:17    Electronically signed by: Reyes Gary MD  Date:    03/04/2024  Time:    22:15               Narrative:    EXAMINATION:  CT ABDOMEN PELVIS WITH IV CONTRAST    CLINICAL HISTORY:  Pancreatitis, acute, severe;    TECHNIQUE:  Low dose axial images, sagittal and  coronal reformations were obtained from the lung bases to the pubic symphysis following the IV administration of 75 mL of Omnipaque 350.  Oral contrast was not given.    COMPARISON:  CTA abdomen pelvis 01/06/2024, 12/29/2023, 12/25/2023.    FINDINGS:  Heart: Unremarkable.    Lung bases: Left lower lobe subsegmental atelectasis.  No consolidation or pleural effusion.    Liver: Liver is enlarged measuring 20 cm in craniocaudal dimension.  No focal hepatic abnormality.    Gallbladder: Normal in appearance without evidence for cholecystitis.    Bile Ducts: No intra or extrahepatic biliary ductal dilation.    Pancreas: Similar mild peripancreatic fat stranding.    Peripancreatic collection is again noted, some of which are new or worse when compared with the prior exam.    New collection along the greater curvature of the stomach measures approximately 4 x 3 x 4 cm in size (axial image 44 and coronal image 114).  This collection is relatively thin walled without internal air.    Small volume relatively unorganized fluid between the body of the pancreas and the inferior wall of the stomach.    Similar to mildly decreased size of small fluid collections along the anterior aspect of the pancreas (axial image 60)    Collection extending along the anterosuperior pancreas is relatively stable in size (axial image 54).    Spleen: Spleen is enlarged measuring 13 cm in craniocaudal dimension.    Adrenals: Unremarkable.    Kidneys/ Ureters: Kidneys are normal in size and enhance symmetrically.  No nephrolithiasis or hydroureteronephrosis.    Bladder: Smooth contours without bladder wall thickening.    Reproductive organs: Unremarkable.    GI Tract/Mesentery: New/worse lower esophageal wall thickening with significant mucosal edema.  Minimal wall thickening at the gastric fundus, mildly improved from most recent prior.  Visualized loops of small and large bowel are normal in caliber without evidence for obstruction or inflammation.  Transverse colon wall thickening has improved.  Appendix is unremarkable.    No abdominopelvic ascites, intraperitoneal free air, or significant adenopathy.    Abdominal wall/extraperitoneal soft tissues: Unremarkable.    Vasculature: Abdominal aorta is normal in caliber, contour, and course without significant calcific atherosclerosis.  Retroaortic left renal vein.    Portal vein is patent.  Chronic splenic vein occlusion with numerous large collateral vessels in the upper abdomen.    Bones: No acute displaced fracture.                                        CT Head Without Contrast (Final result)  Result time 03/04/24 17:02:35      Final result by Fritz Son MD (03/04/24 17:02:35)                   Impression:      No acute hemorrhage or major vascular territory infarct.    Possible subtle sulcation abnormality with calcification in the right frontal lobe, similar to priors.  Appearance suggestive of a cortical malformation/developmental anomaly or possible remote insult.  MRI may be helpful for further characterization if warranted clinically.    This report was flagged in Epic as abnormal.    Electronically signed by resident: Darien Sprague  Date:    03/04/2024  Time:    16:44    Electronically signed by: Fritz Son MD  Date:    03/04/2024  Time:    17:02               Narrative:    EXAMINATION:  CT HEAD WITHOUT CONTRAST    CLINICAL HISTORY:  Syncope, recurrent;    TECHNIQUE:  Low dose axial CT images obtained throughout the head without the use of intravenous contrast.  Axial, sagittal and coronal reconstructions were performed.    COMPARISON:  CT head 12/16/2022    FINDINGS:  Intracranial compartment:    Ventricles and sulci are normal in size without evidence of hydrocephalus.    Apparent deep sulcus in the right frontal lobe (series 601, image 60; series 2, image 17).  Possible small focus underlying white matter loss with punctate calcification.  Overall appearance unchanged from prior.  No new  parenchymal hemorrhage, edema, mass effect or major vascular distribution infarct.    No extra-axial blood or fluid collections.    Skull/extracranial contents (limited evaluation):    No displaced calvarial fracture.    The mastoid air cells are clear.  Patchy mucosal thickening of the ethmoid air cells.  Remainder of the visualized paranasal sinuses are essentially clear.                                       X-Ray Knee 3 View Bilateral (Final result)  Result time 03/04/24 13:27:57   Procedure changed from X-Ray Knee 3 View Right     Final result by Abbe Meza MD (03/04/24 13:27:57)                   Impression:      See above      Electronically signed by: Abbe Meza MD  Date:    03/04/2024  Time:    13:27               Narrative:    EXAMINATION:  XR KNEE 3 VIEW BILATERAL    CLINICAL HISTORY:  Fall; Unspecified fall, initial encounter    TECHNIQUE:  AP, lateral, and Merchant views of both knees were performed.    COMPARISON:  None    FINDINGS:  No fracture or dislocation.  No bone destruction identified.  Slight soft tissue swelling noted anterior to the right knee joint.                                       X-Ray Elbow Complete Right (Final result)  Result time 03/04/24 13:35:52      Final result by Abbe Meza MD (03/04/24 13:35:52)                   Impression:      See above      Electronically signed by: Abbe Meza MD  Date:    03/04/2024  Time:    13:35               Narrative:    EXAMINATION:  XR ELBOW COMPLETE 3 VIEW RIGHT    CLINICAL HISTORY:  . Unspecified fall, initial encounter    TECHNIQUE:  AP, lateral, and oblique views of the right elbow were performed.    COMPARISON:  None    FINDINGS:  No fracture or dislocation.  No bone destruction identified                                        Assessment/Plan:      * Acute on chronic pancreatitis  -Afebrile, no leukocytosis.  -Lipase 31  -Lactic 2.3>0.7 s/p IVF.  -CT AP : Sequela pancreatitis with multiple fluid collections in the upper  abdomen, some of which are new/worse and some which have improved from prior study.  New thin walled collection adjacent to the stomach measures up to 4 cm in size.   -Continue IVF.  -CLD for now, advance as tolerate.  -PRN pain and nausea control.  -Encouraged EtOH cessation.    High anion gap metabolic acidosis  -Bicarb 17, anion gap 18 on admit likely due to starvation ketoacidosis vs alcoholic ketoacidosis although patient denies significant EtOH consumption.  -Continue IVF.  -CLD.  -PRN antiemetics.  -Monitor on labs.    resolved    Chronic pain syndrome  -History noted.  -Currently on IV opiates in the setting of acute on chronic pancreatitis.    Pancreatic pseudocyst/cyst  -Chronic issue.  -CT AP: Sequela pancreatitis with multiple fluid collections in the upper abdomen, some of which are new/worse and some which have improved from prior study.  New thin walled collection adjacent to the stomach measures up to 4 cm in size.   AES consult    Alcohol use disorder  Hx of EtOH use disorder. Reports cutting back on EtOH consumption since 9/2023 but still drinks rum or wine occasionally but unable to quantify how much or how often. States his last drink was a glass of wine about 3 days ago. Denies hx of ETOH withdrawal.  -EtOH level 27, UDS pending.  -PETH in process.  -Nursing to perform CIWA q shift although low concern for withdrawal  -Initiate folic acid, thiamine, and MVI.    -Discussed the dangers of continued ETOH use with Pt and apparent systemic effects of his abuse.      Splenic vein thrombosis  Patient has history of splenic vein thrombosis with gastric varices (11/2023 on CTA) in the setting of polycythemia vera and chronic pancreatitis. He is not currently on anticoagulation.     Mild intermittent asthma without complication  -No s/s of acute exacerbation.  -PRN albuterol inhaler.    Hematemesis  Patient with history of known MW tear noted on previous EGD 9/2023. Patient says he has a small amount of  blood in his vomit. He was told he didn't need to take protonix, so he has not taken it in a few months. Will restart PPI and give a dose of carafate for burning pain. Patient reports he has not vomited in several hours and is tolerating sips of water currently.     - restart protonix  - trend h/h    Syncope  Patient reports passing out while walking his dog last night. Endorses increased fatigue and generalized weakness due to significant nausea, vomiting, and decreased PO intake. Denies any significant chest pain, SOB, lightheadedness, or vision changes prior to the episode. Unsure if he struck his head but no obvious signs of head trauma on exam   Likely related to significant dehydration.   -Labs with high anion gap metabolic acidosis. Lactic 2.3>0.7. EtOH 27  -UA/UDS pending.  -EKG with ST, no acute ischemic changes  -Check orthostatics.  -CTH with no acute hemorrhage or major vascular territory infarct. Possible subtle sulcation abnormality with calcification in the right frontal lobe, similar to priors.  -Continue IVF.  -Tele-monitoring.   -Fall precautions.     Polycythemia vera  Currently stable, has not required phlebotomy for several months due to hematocrit within normal limits.   -Follows outpatient with hematology  -Not on AC due to hx of rectal bleeding     HIV infection  Patient was diagnosed with HIV 2 years ago and has been on anti retroviral therapy. He is sexually active with male partners. His most recent CD4 count was 1080 (10/2023).     - Continue home biktarvy       VTE Risk Mitigation (From admission, onward)           Ordered     IP VTE HIGH RISK PATIENT  Once         03/04/24 1825     Place sequential compression device  Until discontinued         03/04/24 1825                    Discharge Planning   CASTILLO: 3/7/2024     Code Status: Full Code   Is the patient medically ready for discharge?:     Reason for patient still in hospital (select all that apply): Patient trending condition,  Laboratory test, Treatment, and Consult recommendations                     Zuly Bae MD  Department of Hospital Medicine   [facility] S[facility] ([place])

## 2024-03-06 NOTE — PLAN OF CARE
"[facility] ([facility])  Initial Discharge Assessment       Primary Care Provider: Pina Jones NP    Admission Diagnosis: Syncope [R55]  Fall [W19.XXXA]  Chest pain [R07.9]  Alcohol-induced acute pancreatitis, unspecified complication status [K85.20]  HIV infection, unspecified symptom status [B20]    Admission Date: 3/4/2024  Expected Discharge Date: 3/8/2024    Transition of Care Barriers: (P) None    Payor: MEDICAID / Plan: PENDING MEDICAID / Product Type: Government /     Extended Emergency Contact Information  Primary Emergency Contact: Denice Cardona  Mobile Phone: [phone]  Relation: Mother  Preferred language: English   needed? No    Discharge Plan A: (P) Home with family  Discharge Plan B: (P) Home      [id number] [place], [place] - [address] AT [facility] [address]  [address]  [facility] [address]  Phone: [phone] Fax: [phone]    [facility] [place] - [address]  [address]  [facility] [address]  Phone: [phone] Fax: [phone]    [facility]  [address] [facility] [address]  Phone: [phone] Fax: [phone]      Initial Assessment (most recent)       Adult Discharge Assessment - 03/06/24 1429          Discharge Assessment    Assessment Type Discharge Planning Assessment (P)      Confirmed/corrected address, phone number and insurance Yes (P)      Confirmed Demographics Correct on Facesheet (P)      Source of Information patient (P)      Reason For Admission "pancreatitis and a fall 2 nights ago" (P)      People in Home alone (P)      Do you expect to return to your current living situation? Yes (P)      Do you have help at home or someone to help you manage your care at home? No (P)      Prior to hospitilization cognitive status: Alert/Oriented (P)      Current cognitive status: Alert/Oriented (P)      Walking or Climbing " Stairs Difficulty no (P)      Dressing/Bathing Difficulty no (P)      Equipment Currently Used at Home none (P)      Readmission within 30 days? No (P)      Patient currently being followed by outpatient case management? No (P)      Do you currently have service(s) that help you manage your care at home? No (P)      Do you have prescription coverage? No (P)      Coverage MEDICAID - PENDING MEDICAID - (P)      Who is going to help you get home at discharge? Denice Cardona (Mother) [phone] (P)      How do you get to doctors appointments? health plan transportation;family or friend will provide (P)      Are you on dialysis? No (P)      Do you take coumadin? No (P)      Discharge Plan A Home with family (P)      Discharge Plan B Home (P)      DME Needed Upon Discharge  none (P)      Discharge Plan discussed with: Patient (P)      Transition of Care Barriers None (P)         Financial Resource Strain    How hard is it for you to pay for the very basics like food, housing, medical care, and heating? Not very hard (P)         Housing Stability    In the last 12 months, was there a time when you were not able to pay the mortgage or rent on time? No (P)      In the last 12 months, how many places have you lived? 1 (P)      In the last 12 months, was there a time when you did not have a steady place to sleep or slept in a shelter (including now)? No (P)         Transportation Needs    In the past 12 months, has lack of transportation kept you from medical appointments or from getting medications? No (P)      In the past 12 months, has lack of transportation kept you from meetings, work, or from getting things needed for daily living? No (P)         Social Connections    In a typical week, how many times do you talk on the phone with family, friends, or neighbors? More than three times a week (P)      How often do you get together with friends or relatives? More than three times a week (P)      Are you , ,  , , never , or living with a partner? Never  (P)         Alcohol Use    Q1: How often do you have a drink containing alcohol? Never (P)      Q2: How many drinks containing alcohol do you have on a typical day when you are drinking? Patient does not drink (P)      Q3: How often do you have six or more drinks on one occasion? Never (P)                  Discharge Plan A and Plan B have been determined by review of patient's clinical status, future medical and therapeutic needs, and coverage/benefits for post-acute care in coordination with multidisciplinary team members.                     HUBER Lara, LMSW  [facility]  Case Management  Ext. [phone]

## 2024-03-06 NOTE — MEDICAL/APP STUDENT
HPI: Tasia Cardona is a 23 y.o. male with a PMHx of alcohol use disorder, HIV, chronic pancreatitis, asthma, splenic vein thrombosis, anemia, and polycythemia vera who presents to the ED with complaints of abdominal pain, nausea, and vomiting for the past days. He says that this feels similar to his past pancreatitis flairs. Patient reports multiple episodes of bilious, bloody tinged emesis for the last 3 days.Patient specifies abdominal pain is in his LUQ and LLQ that intermittently radiates to the right side of his abdomen and shoulder. Patient also endorses chest pain he describes as cramping and burning, probably due to emesis. Patient states that he fainted the evening prior to presentation, but denies any dizziness or visual field disturbances preceding fall. Patient with bruising and abrasions to bilateral knees and elbow.  He denies fever/chills, SOB, cough, or dysuria. Patient says his last drink was 1 glass of red wine 3-4 days prior to presentation, and he denies any recent binge drinking episodes.     In the ED, patient tachycardic otherwise vitals stable, afebrile. CBC with stable anemia. Bicarb 17. Anion gap 18. Alk phos 136. ALT 59. Lipase 31. Lactic 2.3>0.7. PETH pending. ETOH 27. EKG with ST, rate 115, no acute ischemic changes. UA pending. Xray R elbow with no fracture or dislocation. Xray misti knees with no fracture or dislocation. Slight soft tissue swelling noted anterior to the right knee joint. CTH with no acute hemorrhage or major vascular territory infarct. Possible subtle sulcation abnormality with calcification in the right frontal lobe, similar to priors. The patient received valium, droperidol x2, morphine x2, dilaudid, 2L NS, MVI, and thiamine.    Interval History:  No acute events overnight. Patient reports worsening of pain today at 9/10. Will increase his dilaudid. Diet changed from soft diet back to clear liquid diet.     Vital Signs (most recent):  Temperature: 98.4 degrees  F  Blood pressure: 122/81  Pulse: 80  RR: 18  Satting 100% on RA       Physical Exam  Constitutional:       Appearance: Normal appearance. He is obese.   HENT:      Head: Normocephalic.      Nose: Nose normal.      Mouth/Throat:      Mouth: Mucous membranes are moist.      Pharynx: Oropharynx is clear.   Eyes:      Extraocular Movements: Extraocular movements intact.      Conjunctiva/sclera: Conjunctivae normal.      Pupils: Pupils are equal, round, and reactive to light.   Cardiovascular:      Rate and Rhythm: Normal rate and regular rhythm.      Pulses: Normal pulses.      Heart sounds: Normal heart sounds.   Pulmonary:      Effort: Pulmonary effort is normal.      Breath sounds: Normal breath sounds.   Abdominal:      General: Abdomen is flat. Bowel sounds are normal.      Palpations: Abdomen is soft.      Tenderness: There is abdominal tenderness.      Comments: LUQ, LLQ, RUQ   Musculoskeletal:         General: Normal range of motion.      Cervical back: Normal range of motion and neck supple.      Comments: Bruising and abrasions in bilateral elbows/knees   Skin:     General: Skin is warm and dry.   Neurological:      General: No focal deficit present.      Mental Status: He is alert. Mental status is at baseline. He is disoriented.   Psychiatric:         Mood and Affect: Mood normal.         Behavior: Behavior normal.         Thought Content: Thought content normal.         Judgment: Judgment normal.      Labs:    Assessment/Plan:  * Acute on chronic pancreatitis  -Continue IVF.  -CLD for now, advance as tolerate.  -PRN pain and nausea control.  -Encouraged EtOH cessation.     High anion gap metabolic acidosis  resolved     Chronic pain syndrome  -Currently on IV opiates in the setting of acute on chronic pancreatitis.     Pancreatic pseudocyst/cyst  -Chronic issue.  -CT AP: Sequela pancreatitis with multiple fluid collections in the upper abdomen, some of which are new/worse and some which have improved from  prior study.  New thin walled collection adjacent to the stomach measures up to 4 cm in size.   AES consult     Alcohol use disorder  Hx of EtOH use disorder. Reports cutting back on EtOH consumption since 9/2023 but still drinks rum or wine occasionally but unable to quantify how much or how often. States his last drink was a glass of wine about 3 days ago. Denies hx of ETOH withdrawal.  -EtOH level 27, UDS pending.  -PETH in process.  -Nursing to perform CIWA q shift although low concern for withdrawal  -Initiate folic acid, thiamine, and MVI.    -Discussed the dangers of continued ETOH use with Pt and apparent systemic effects of his abuse.       Splenic vein thrombosis  Patient has history of splenic vein thrombosis with gastric varices (11/2023 on CTA) in the setting of polycythemia vera and chronic pancreatitis. He is not currently on anticoagulation.      Mild intermittent asthma without complication  -No s/s of acute exacerbation.  -PRN albuterol inhaler.     Hematemesis  Patient with history of known MW tear noted on previous EGD 9/2023. Patient says he has a small amount of blood in his vomit. He was told he didn't need to take protonix, so he has not taken it in a few months. Will restart PPI and give a dose of carafate for burning pain. Patient reports he has not vomited in several hours and is tolerating sips of water currently.     - restart protonix  - trend h/h  -EGD     Syncope  Patient reports passing out while walking his dog last night. Endorses increased fatigue and generalized weakness due to significant nausea, vomiting, and decreased PO intake. Denies any significant chest pain, SOB, lightheadedness, or vision changes prior to the episode. Unsure if he struck his head but no obvious signs of head trauma on exam   Likely related to significant dehydration.   -Labs with high anion gap metabolic acidosis. Lactic 2.3>0.7. EtOH 27  -UA/UDS pending.  -EKG with ST, no acute ischemic changes  -Check  orthostatics.  -CTH with no acute hemorrhage or major vascular territory infarct. Possible subtle sulcation abnormality with calcification in the right frontal lobe, similar to priors.  -Continue IVF.  -Tele-monitoring.   -Fall precautions.      Polycythemia vera  Currently stable, has not required phlebotomy for several months due to hematocrit within normal limits.   -Follows outpatient with hematology  -Not on AC due to hx of rectal bleeding      HIV infection  Patient was diagnosed with HIV 2 years ago and has been on anti retroviral therapy. He is sexually active with male partners. His most recent CD4 count was 1080 (10/2023).     - Continue home biktarvy

## 2024-03-06 NOTE — ASSESSMENT & PLAN NOTE
-Chronic issue.  -CT AP: Sequela pancreatitis with multiple fluid collections in the upper abdomen, some of which are new/worse and some which have improved from prior study.  New thin walled collection adjacent to the stomach measures up to 4 cm in size.   AES consult

## 2024-03-06 NOTE — ASSESSMENT & PLAN NOTE
-Bicarb 17, anion gap 18 on admit likely due to starvation ketoacidosis vs alcoholic ketoacidosis although patient denies significant EtOH consumption.  -Continue IVF.  -CLD.  -PRN antiemetics.  -Monitor on labs.    resolved

## 2024-03-06 NOTE — NURSING
Nurses Note -- 4 Eyes      3/6/2024   7:25 AM      Skin assessed during: Admit      [x] No Altered Skin Integrity Present    [x]Prevention Measures Documented      [] Yes- Altered Skin Integrity Present or Discovered   [] LDA Added if Not in Epic (Describe Wound)   [] New Altered Skin Integrity was Present on Admit and Documented in LDA   [] Wound Image Taken    Wound Care Consulted? No    Attending Nurse: Jamaal AN RN    Second RN/Staff Member:   Estela MARSHALL

## 2024-03-06 NOTE — HOSPITAL COURSE
Patient admitted for management of pancreatitis.  Patient requesting advancing diet, significant improvement in pain.  Diet advanced to soft diet.  AES consulted for pancreatic cyst.  UDS was positive for cocaine, opiates and marijuana.  Discussed with AES team, no emergent need for EGD or draining any of the pancreatic cyst.  Patient continues to have pain requiring IV pain medication.

## 2024-03-06 NOTE — CONSULTS
[facility]  AES  Consult Note    Patient Name: Tasia Cardona  MRN: [id number]  Admission Date: 3/4/2024  Hospital Length of Stay: 1 days  Code Status: Full Code   Attending Provider: Nam Cleaning MD   Consulting Provider: Eren Orellana MD  Primary Care Physician: Pina Jones NP  Principal Problem:Acute on chronic pancreatitis    Inpatient consult to [facility] ([facility])  Consult performed by: Eren Orellana MD  Consult ordered by: Zuly Bae MD        Subjective:     HPI:  Mr Cardona is a 22yo PMHx HIV, PCV with splenic vein thrombosis not on AC, asthma, necrotizing pancreatitis presented to [facility] on  03/04 with abdominal pain.     AES consulted for pancreatic fluid collection     Hospitalized 12/2023 and 10//23/2023 for pancreatitis.      He had EUS 10/23/2023 for pancreatic necrosis. EUS was not suitable for endoscopic transmural stenting/ drainage. There was comment for interval CT in 4-6 weeks with consideration of EUS drainage and or ERCP to assess/ treat for possible PD disruption.       Patient did eventually have EUS cystgastrostomy conducted 11/21 at [facility] with several necrosectomies. Removed 12/13.    Hospitalization 12/15/2023, two days after cystgastro stent removed. AES consulted for consideration of cystgastrostomy of fluid collections but given recent cystgastro removal and immature appearance, held off.    Reports sobriety since then. Reports one week of N/V, inability to keep po down. Some blood streaked emesis.    VS unremarkable since arrival  CBC w/o leukocytosis, Hgb ranging 8-11, plts wnl  BMP w/ Cr wnl  LFTs w/ AST/ ALT 41/ 8, BR wnl, ALP wnl  Lipase wnl    CTAP w/ IV contrast with sequela of pancreatitis with multiple fluid collections in the upper abdomen, some of which are new/worse and some which have improved from prior study. New thin walled collection adjacent to the stomach measures up to 4 cm in size. No gastric distention. Bile  ducts wnl. Some esophagitis.    Objective:     Vitals:    03/05/24 1838   BP:    Pulse:    Resp: 18   Temp:        Constitutional:  not in acute distress and well developed  HENT: Head: Normal, normocephalic, atraumatic.  Eyes: conjunctiva clear and sclera nonicteric  GI: soft, non-tender, without masses or organomegaly  Musculoskeletal: no muscular tenderness noted  Skin: normal color  Neurological: alert      Assessment/Plan:     22yo PMHx HIV, PCV with splenic vein thrombosis not on AC, asthma, necrotizing pancreatitis presented to [facility] on  03/04 with abdominal pain.     AES consulted for pancreatic fluid collection    No evidence of gastric distention to indicate fluid collection is obstructing or any leukocytosis to indicate infection--no indication for cystgastrostomy at this time    Problem List:  Pancreatitis with fluid collections    Recommendations:  No indication for cystgastrostomy at this time  Would recommend patient follow up with [facility] for management of pancreatitis moving forward--if not would recommend repeat imaging in 4-6 weeks  Manage pancreatitis with IV fluids and pain management  Advance diet as tolerated   Monitor Hgb, may need general GI consult for EGD if continues to have downtrend in Hgb or hematemesis  Consider po PPI BID for esophagitis noted on CT scan    Thank you for involving us in the care of Tasia Cardona. Please call with any additional questions, concerns or changes in the patient's clinical status. We will continue to follow.     Eren Orellana MD  Gastroenterology Fellow PGY VI  [facility]

## 2024-03-06 NOTE — ASSESSMENT & PLAN NOTE
Patient with history of known MW tear noted on previous EGD 9/2023. Patient says he has a small amount of blood in his vomit. He was told he didn't need to take protonix, so he has not taken it in a few months. Will restart PPI and give a dose of carafate for burning pain. Patient reports he has not vomited in several hours and is tolerating sips of water currently.     - restart protonix  - trend h/h   negative detailed exam

## 2024-03-06 NOTE — PLAN OF CARE
Problem: Adult Inpatient Plan of Care  Goal: Absence of Hospital-Acquired Illness or Injury  Outcome: Ongoing, Not Progressing  Intervention: Identify and Manage Fall Risk  Flowsheets (Taken 3/6/2024 1623)  Safety Promotion/Fall Prevention:   assistive device/personal item within reach   commode/urinal/bedpan at bedside   diversional activities provided   in recliner, wheels locked   lighting adjusted   medications reviewed   high risk medications identified   nonskid shoes/socks when out of bed   room near unit station   side rails raised x 2  Intervention: Prevent Skin Injury  Flowsheets (Taken 3/6/2024 1623)  Body Position: position changed independently  Skin Protection:   adhesive use limited   incontinence pads utilized  Intervention: Prevent and Manage VTE (Venous Thromboembolism) Risk  Flowsheets (Taken 3/6/2024 1623)  Activity Management: Ambulated -L4  VTE Prevention/Management:   remove, assess skin, and reapply sequential compression device   ambulation promoted   bleeding precations maintained   bleeding risk assessed   bleeding risk factor(s) identified, provider notified  Range of Motion:   active ROM (range of motion) encouraged   ROM (range of motion) performed  Intervention: Prevent Infection  Flowsheets (Taken 3/6/2024 1623)  Infection Prevention:   cohorting utilized   hand hygiene promoted   equipment surfaces disinfected   rest/sleep promoted   environmental surveillance performed  Goal: Optimal Comfort and Wellbeing  Outcome: Ongoing, Not Progressing  Intervention: Monitor Pain and Promote Comfort  Flowsheets (Taken 3/6/2024 1623)  Pain Management Interventions:   around-the-clock dosing utilized   awakened for pain meds per patient request   care clustered   diversional activity provided   medication offered   pain management plan reviewed with patient/caregiver   pillow support provided   position adjusted   premedicated for activity   quiet environment facilitated   relaxation techniques  promoted  Intervention: Provide Person-Centered Care  Flowsheets (Taken 3/6/2024 1623)  Trust Relationship/Rapport:   care explained   questions answered   choices provided   questions encouraged   emotional support provided   reassurance provided   empathic listening provided   thoughts/feelings acknowledged     Problem: Pain Acute  Goal: Acceptable Pain Control and Functional Ability  Outcome: Ongoing, Not Progressing  Intervention: Develop Pain Management Plan  Flowsheets (Taken 3/6/2024 1623)  Pain Management Interventions:   around-the-clock dosing utilized   awakened for pain meds per patient request   care clustered   diversional activity provided   medication offered   pain management plan reviewed with patient/caregiver   pillow support provided   position adjusted   premedicated for activity   quiet environment facilitated   relaxation techniques promoted  Intervention: Prevent or Manage Pain  Flowsheets (Taken 3/6/2024 1623)  Sleep/Rest Enhancement:   awakenings minimized   regular sleep/rest pattern promoted   therapeutic touch utilized   relaxation techniques promoted   room darkened   reading promoted   noise level reduced   consistent schedule promoted   natural light exposure provided  Sensory Stimulation Regulation:   auditory stimulation provided   care clustered   quiet environment promoted   lighting decreased   tactile stimulation provided   television on   visual stimulation provided  Bowel Elimination Promotion:   adequate fluid intake promoted   privacy promoted   ambulation promoted   commode/bedpan at bedside   diet adjusted  Medication Review/Management:   medications reviewed   high-risk medications identified   pharmacy consulted

## 2024-03-06 NOTE — SUBJECTIVE & OBJECTIVE
Interval history:  No overnight events.  Patient reports significant improvement in pain.  Diet advanced.    Review of Systems   Constitutional:  Positive for fatigue. Negative for chills, diaphoresis and fever.   HENT:  Negative for congestion, postnasal drip and rhinorrhea.    Eyes:  Negative for photophobia.   Respiratory:  Negative for cough, shortness of breath and wheezing.    Cardiovascular:  Positive for chest pain (burning/cramping). Negative for palpitations and leg swelling.   Gastrointestinal:  Positive for abdominal pain, nausea and vomiting. Negative for abdominal distention, blood in stool and diarrhea.   Genitourinary:  Negative for dysuria, frequency and hematuria.   Musculoskeletal:  Negative for back pain, gait problem and myalgias.   Skin:  Positive for wound. Negative for pallor and rash.   Neurological:  Positive for syncope and weakness (generalized). Negative for dizziness, facial asymmetry, speech difficulty, numbness and headaches.   Psychiatric/Behavioral:  Negative for confusion and hallucinations. The patient is not nervous/anxious.      Objective:     Vital Signs (Most Recent):  Temp: 99.4 °F (37.4 °C) (03/05/24 2007)  Pulse: 100 (03/05/24 2007)  Resp: 18 (03/05/24 2007)  BP: 129/82 (03/05/24 2007)  SpO2: 96 % (03/05/24 2007) Vital Signs (24h Range):  Temp:  [98.5 °F (36.9 °C)-99.4 °F (37.4 °C)] 99.4 °F (37.4 °C)  Pulse:  [] 100  Resp:  [15-20] 18  SpO2:  [96 %-100 %] 96 %  BP: (121-137)/(60-92) 129/82     Weight: 63.5 kg (140 lb)  Body mass index is 20.09 kg/m².     Physical Exam  Vitals and nursing note reviewed.   Constitutional:       General: He is not in acute distress.     Appearance: He is not toxic-appearing or diaphoretic.   HENT:      Head: Normocephalic and atraumatic.      Nose: Nose normal.      Mouth/Throat:      Mouth: Mucous membranes are moist.   Eyes:      Pupils: Pupils are equal, round, and reactive to light.   Cardiovascular:      Rate and Rhythm: Normal rate  and regular rhythm.      Pulses: Normal pulses.   Pulmonary:      Effort: Pulmonary effort is normal. No respiratory distress.      Breath sounds: No wheezing, rhonchi or rales.   Abdominal:      General: Bowel sounds are normal. There is no distension.      Palpations: Abdomen is soft.      Tenderness: There is abdominal tenderness in the epigastric area, left upper quadrant and left lower quadrant. There is no guarding.   Musculoskeletal:         General: No swelling, tenderness or deformity. Normal range of motion.      Right elbow: Normal range of motion.      Cervical back: Normal range of motion.      Right knee: Normal range of motion.      Left knee: Normal range of motion.      Comments: Bruising and abrasions to misti knees and R elbow   Skin:     General: Skin is warm and dry.      Capillary Refill: Capillary refill takes less than 2 seconds.   Neurological:      General: No focal deficit present.      Mental Status: He is alert and oriented to person, place, and time.      Sensory: No sensory deficit.      Motor: No weakness.   Psychiatric:         Mood and Affect: Mood normal.         Behavior: Behavior normal.              CRANIAL NERVES     CN III, IV, VI   Pupils are equal, round, and reactive to light.       Significant Labs: All pertinent labs within the past 24 hours have been reviewed.  CBC:   Recent Labs   Lab 03/04/24  1145 03/04/24  1153 03/05/24  0338   WBC 10.14  --  6.25   HGB 11.0*  --  8.6*   HCT 36.9* 39 29.9*     --  214       CMP:   Recent Labs   Lab 03/04/24  1145 03/05/24  0338    134*   K 3.5 3.2*    105   CO2 17* 20*   GLU 80 90   BUN 9 7   CREATININE 0.7 0.7   CALCIUM 9.3 8.2*   PROT 8.2 6.0   ALBUMIN 4.1 3.0*   BILITOT 0.4 0.5   ALKPHOS 136* 95   AST 59* 54*   ALT 14 9*   ANIONGAP 18* 9       Lactic Acid:   Recent Labs   Lab 03/04/24  1145 03/04/24  1514   LACTATE 2.3* 0.7       Lipase:   Recent Labs   Lab 03/04/24  1145   LIPASE 31         Significant Imaging: I  have reviewed all pertinent imaging results/findings within the past 24 hours.  Imaging Results              CT Abdomen Pelvis With IV Contrast NO Oral Contrast (Final result)  Result time 03/04/24 22:15:44      Final result by Reyes Gary MD (03/04/24 22:15:44)                   Impression:      1. Sequela pancreatitis with multiple fluid collections in the upper abdomen, some of which are new/worse and some which have improved from prior study.  New thin walled collection adjacent to the stomach measures up to 4 cm in size.  2. New/worse significant lower esophageal wall thickening.  Suggest correlation for any clinical signs of esophagitis and/or recent vomiting.  3. 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  4. Hepatosplenomegaly and probable hepatic steatosis.  5. Additional findings as detailed above.    Electronically signed by resident: Chrissy Beverly  Date:    03/04/2024  Time:    20:17    Electronically signed by: Reyes Gary MD  Date:    03/04/2024  Time:    22:15               Narrative:    EXAMINATION:  CT ABDOMEN PELVIS WITH IV CONTRAST    CLINICAL HISTORY:  Pancreatitis, acute, severe;    TECHNIQUE:  Low dose axial images, sagittal and coronal reformations were obtained from the lung bases to the pubic symphysis following the IV administration of 75 mL of Omnipaque 350.  Oral contrast was not given.    COMPARISON:  CTA abdomen pelvis 01/06/2024, 12/29/2023, 12/25/2023.    FINDINGS:  Heart: Unremarkable.    Lung bases: Left lower lobe subsegmental atelectasis.  No consolidation or pleural effusion.    Liver: Liver is enlarged measuring 20 cm in craniocaudal dimension.  No focal hepatic abnormality.    Gallbladder: Normal in appearance without evidence for cholecystitis.    Bile Ducts: No intra or extrahepatic biliary ductal dilation.    Pancreas: Similar mild peripancreatic fat stranding.    Peripancreatic collection is again noted, some of which are new  or worse when compared with the prior exam.    New collection along the greater curvature of the stomach measures approximately 4 x 3 x 4 cm in size (axial image 44 and coronal image 114).  This collection is relatively thin walled without internal air.    Small volume relatively unorganized fluid between the body of the pancreas and the inferior wall of the stomach.    Similar to mildly decreased size of small fluid collections along the anterior aspect of the pancreas (axial image 60)    Collection extending along the anterosuperior pancreas is relatively stable in size (axial image 54).    Spleen: Spleen is enlarged measuring 13 cm in craniocaudal dimension.    Adrenals: Unremarkable.    Kidneys/ Ureters: Kidneys are normal in size and enhance symmetrically.  No nephrolithiasis or hydroureteronephrosis.    Bladder: Smooth contours without bladder wall thickening.    Reproductive organs: Unremarkable.    GI Tract/Mesentery: New/worse lower esophageal wall thickening with significant mucosal edema.  Minimal wall thickening at the gastric fundus, mildly improved from most recent prior.  Visualized loops of small and large bowel are normal in caliber without evidence for obstruction or inflammation. Transverse colon wall thickening has improved.  Appendix is unremarkable.    No abdominopelvic ascites, intraperitoneal free air, or significant adenopathy.    Abdominal wall/extraperitoneal soft tissues: Unremarkable.    Vasculature: Abdominal aorta is normal in caliber, contour, and course without significant calcific atherosclerosis.  Retroaortic left renal vein.    Portal vein is patent.  Chronic splenic vein occlusion with numerous large collateral vessels in the upper abdomen.    Bones: No acute displaced fracture.                                        CT Head Without Contrast (Final result)  Result time 03/04/24 17:02:35      Final result by Fritz Son MD (03/04/24 17:02:35)                   Impression:       No acute hemorrhage or major vascular territory infarct.    Possible subtle sulcation abnormality with calcification in the right frontal lobe, similar to priors.  Appearance suggestive of a cortical malformation/developmental anomaly or possible remote insult.  MRI may be helpful for further characterization if warranted clinically.    This report was flagged in Epic as abnormal.    Electronically signed by resident: Darien Sprague  Date:    03/04/2024  Time:    16:44    Electronically signed by: Fritz Son MD  Date:    03/04/2024  Time:    17:02               Narrative:    EXAMINATION:  CT HEAD WITHOUT CONTRAST    CLINICAL HISTORY:  Syncope, recurrent;    TECHNIQUE:  Low dose axial CT images obtained throughout the head without the use of intravenous contrast.  Axial, sagittal and coronal reconstructions were performed.    COMPARISON:  CT head 12/16/2022    FINDINGS:  Intracranial compartment:    Ventricles and sulci are normal in size without evidence of hydrocephalus.    Apparent deep sulcus in the right frontal lobe (series 601, image 60; series 2, image 17).  Possible small focus underlying white matter loss with punctate calcification.  Overall appearance unchanged from prior.  No new parenchymal hemorrhage, edema, mass effect or major vascular distribution infarct.    No extra-axial blood or fluid collections.    Skull/extracranial contents (limited evaluation):    No displaced calvarial fracture.    The mastoid air cells are clear.  Patchy mucosal thickening of the ethmoid air cells.  Remainder of the visualized paranasal sinuses are essentially clear.                                       X-Ray Knee 3 View Bilateral (Final result)  Result time 03/04/24 13:27:57   Procedure changed from X-Ray Knee 3 View Right     Final result by Abbe Meza MD (03/04/24 13:27:57)                   Impression:      See above      Electronically signed by: Abbe Meza MD  Date:    03/04/2024  Time:    13:27                Narrative:    EXAMINATION:  XR KNEE 3 VIEW BILATERAL    CLINICAL HISTORY:  Fall; Unspecified fall, initial encounter    TECHNIQUE:  AP, lateral, and Merchant views of both knees were performed.    COMPARISON:  None    FINDINGS:  No fracture or dislocation.  No bone destruction identified.  Slight soft tissue swelling noted anterior to the right knee joint.                                       X-Ray Elbow Complete Right (Final result)  Result time 03/04/24 13:35:52      Final result by Abbe Meza MD (03/04/24 13:35:52)                   Impression:      See above      Electronically signed by: Abbe Meza MD  Date:    03/04/2024  Time:    13:35               Narrative:    EXAMINATION:  XR ELBOW COMPLETE 3 VIEW RIGHT    CLINICAL HISTORY:  . Unspecified fall, initial encounter    TECHNIQUE:  AP, lateral, and oblique views of the right elbow were performed.    COMPARISON:  None    FINDINGS:  No fracture or dislocation.  No bone destruction identified

## 2024-03-06 NOTE — PROGRESS NOTES
[facility] ([address] Medicine  Progress Note    Patient Name: Tasia Cardona  MRN: [id number]  Patient Class: IP- Inpatient   Admission Date: 3/4/2024  Length of Stay: 2 days  Attending Physician: Zuly Bae MD  Primary Care Provider: Pina Jones NP        Subjective:     Principal Problem:Acute on chronic pancreatitis        HPI:  Tasia Cardona is a 23 y.o. male with a PMHx of alcohol use disorder, HIV, chronic pancreatitis, asthma, splenic vein thrombosis, anemia, and polycythemia vera who presents to the ED with complaints of abdominal pain, nausea, and vomiting x3 days. States that this feels similar to his pancreatitis flares. Patient reports multiple episodes of bilious, bloody tinged emesis for the last 3 days.Patient specifies abdominal pain is in his LUQ and LLQ that intermittently radiates to shoulder. Patient also endorses chest pain he describes as cramping and burning which he associates with the increased strain of emesis. Patient states that he fainted while walking his dog last evening. Patient denies any dizziness or visual field disturbances preceding fall but endorses increased fatigue x 3-4 days and does not believe he hit his head. Patient with scant bruising to bilateral knees and elbow. Patient states nausea, abd pain, and fatigue are mildly improved since arrival. He denies fever/chills, SOB, cough, or dysuria. Patient with prior alcohol use disorder diagnosis. States last drink was 1 glass of red wine 3-4 days PTA.Patient denies any recent binge drinking episodes.    In the ED, patient tachycardic otherwise vitals stable, afebrile. CBC with stable anemia. Bicarb 17. Anion gap 18. Alk phos 136. ALT 59. Lipase 31. Lactic 2.3>0.7. PETH pending. ETOH 27. EKG with ST, rate 115, no acute ischemic changes. UA pending. Xray R elbow with no fracture or dislocation. No bone destruction identified. Xray misti knees with no fracture or dislocation. No bone  destruction identified. Slight soft tissue swelling noted anterior to the right knee joint. CTH with no acute hemorrhage or major vascular territory infarct. Possible subtle sulcation abnormality with calcification in the right frontal lobe, similar to priors.  Appearance suggestive of a cortical malformation/developmental anomaly or possible remote insult. The patient received valium, droperidol x2, morphine x2, dilaudid, 2L NS, MVI, and thiamine.    Overview/Hospital Course:  Patient admitted for management of pancreatitis.  Patient requesting advancing diet, significant improvement in pain.  Diet advanced to soft diet.  AES consulted for pancreatic cyst.  UDS was positive for cocaine, opiates and marijuana.  Discussed with AES team, no emergent need for EGD or draining any of the pancreatic cyst.  Patient continues to have pain requiring IV pain medication.    Interval history:  No overnight events.  Still continues to require IV pain medication.  However requesting to advance diet.  No nausea or vomiting.  Placed on soft diet.  Review of Systems   Constitutional:  Positive for fatigue. Negative for chills, diaphoresis and fever.   HENT:  Negative for congestion, postnasal drip and rhinorrhea.    Eyes:  Negative for photophobia.   Respiratory:  Negative for cough, shortness of breath and wheezing.    Cardiovascular:  Negative for palpitations and leg swelling. Chest pain: burning/cramping.  Gastrointestinal:  Negative for abdominal distention, blood in stool and diarrhea.   Genitourinary:  Negative for dysuria, frequency and hematuria.   Musculoskeletal:  Negative for back pain, gait problem and myalgias.   Skin:  Positive for wound. Negative for pallor and rash.   Neurological:  Negative for dizziness, facial asymmetry, speech difficulty, numbness and headaches. Weakness: generalized.  Psychiatric/Behavioral:  Negative for confusion and hallucinations. The patient is not nervous/anxious.      Objective:      Vital Signs (Most Recent):  Temp: 98.2 °F (36.8 °C) (03/06/24 1534)  Pulse: 80 (03/06/24 1534)  Resp: 16 (03/06/24 1535)  BP: 129/83 (03/06/24 1534)  SpO2: 96 % (03/06/24 1534) Vital Signs (24h Range):  Temp:  [98.2 °F (36.8 °C)-99.4 °F (37.4 °C)] 98.2 °F (36.8 °C)  Pulse:  [] 80  Resp:  [14-18] 16  SpO2:  [96 %-100 %] 96 %  BP: (122-131)/(74-88) 129/83     Weight: 63.5 kg (140 lb)  Body mass index is 20.09 kg/m².     Physical Exam  Vitals and nursing note reviewed.   Constitutional:       General: He is not in acute distress.     Appearance: He is not toxic-appearing or diaphoretic.   HENT:      Head: Normocephalic and atraumatic.      Nose: Nose normal.      Mouth/Throat:      Mouth: Mucous membranes are moist.   Eyes:      Pupils: Pupils are equal, round, and reactive to light.   Cardiovascular:      Rate and Rhythm: Normal rate and regular rhythm.      Pulses: Normal pulses.   Pulmonary:      Effort: Pulmonary effort is normal. No respiratory distress.      Breath sounds: No wheezing, rhonchi or rales.   Abdominal:      General: Bowel sounds are normal. There is no distension.      Palpations: Abdomen is soft.      Tenderness: There is abdominal tenderness in the epigastric area, left upper quadrant and left lower quadrant. There is no guarding.   Musculoskeletal:         General: No swelling, tenderness or deformity. Normal range of motion.      Right elbow: Normal range of motion.      Cervical back: Normal range of motion.      Right knee: Normal range of motion.      Left knee: Normal range of motion.      Comments: Bruising and abrasions to misti knees and R elbow   Skin:     General: Skin is warm and dry.      Capillary Refill: Capillary refill takes less than 2 seconds.   Neurological:      General: No focal deficit present.      Mental Status: He is alert and oriented to person, place, and time.      Sensory: No sensory deficit.      Motor: No weakness.   Psychiatric:         Mood and Affect:  "Mood normal.         Behavior: Behavior normal.              CRANIAL NERVES     CN III, IV, VI   Pupils are equal, round, and reactive to light.       Significant Labs: All pertinent labs within the past 24 hours have been reviewed.  CBC:   Recent Labs   Lab 03/05/24 0338 03/06/24  0555   WBC 6.25 4.48   HGB 8.6* 9.3*   HCT 29.9* 32.2*    219       CMP:   Recent Labs   Lab 03/05/24 0338 03/06/24  0555   * 139   K 3.2* 3.4*    104   CO2 20* 27   GLU 90 94   BUN 7 4*   CREATININE 0.7 0.7   CALCIUM 8.2* 8.3*   PROT 6.0 5.8*   ALBUMIN 3.0* 2.8*   BILITOT 0.5 0.2   ALKPHOS 95 87   AST 54* 41*   ALT 9* 8*   ANIONGAP 9 8       Lactic Acid:   No results for input(s): "LACTATE" in the last 48 hours.    Lipase:   No results for input(s): "LIPASE" in the last 48 hours.      Significant Imaging: I have reviewed all pertinent imaging results/findings within the past 24 hours.  Imaging Results              CT Abdomen Pelvis With IV Contrast NO Oral Contrast (Final result)  Result time 03/04/24 22:15:44      Final result by Reyes Gary MD (03/04/24 22:15:44)                   Impression:      1. Sequela pancreatitis with multiple fluid collections in the upper abdomen, some of which are new/worse and some which have improved from prior study.  New thin walled collection adjacent to the stomach measures up to 4 cm in size.  2. New/worse significant lower esophageal wall thickening.  Suggest correlation for any clinical signs of esophagitis and/or recent vomiting.  3. 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  4. Hepatosplenomegaly and probable hepatic steatosis.  5. Additional findings as detailed above.    Electronically signed by resident: Chrissy Beverly  Date:    03/04/2024  Time:    20:17    Electronically signed by: Reyes Gary MD  Date:    03/04/2024  Time:    22:15               Narrative:    EXAMINATION:  CT ABDOMEN PELVIS WITH IV CONTRAST    CLINICAL " HISTORY:  Pancreatitis, acute, severe;    TECHNIQUE:  Low dose axial images, sagittal and coronal reformations were obtained from the lung bases to the pubic symphysis following the IV administration of 75 mL of Omnipaque 350.  Oral contrast was not given.    COMPARISON:  CTA abdomen pelvis 01/06/2024, 12/29/2023, 12/25/2023.    FINDINGS:  Heart: Unremarkable.    Lung bases: Left lower lobe subsegmental atelectasis.  No consolidation or pleural effusion.    Liver: Liver is enlarged measuring 20 cm in craniocaudal dimension.  No focal hepatic abnormality.    Gallbladder: Normal in appearance without evidence for cholecystitis.    Bile Ducts: No intra or extrahepatic biliary ductal dilation.    Pancreas: Similar mild peripancreatic fat stranding.    Peripancreatic collection is again noted, some of which are new or worse when compared with the prior exam.    New collection along the greater curvature of the stomach measures approximately 4 x 3 x 4 cm in size (axial image 44 and coronal image 114).  This collection is relatively thin walled without internal air.    Small volume relatively unorganized fluid between the body of the pancreas and the inferior wall of the stomach.    Similar to mildly decreased size of small fluid collections along the anterior aspect of the pancreas (axial image 60)    Collection extending along the anterosuperior pancreas is relatively stable in size (axial image 54).    Spleen: Spleen is enlarged measuring 13 cm in craniocaudal dimension.    Adrenals: Unremarkable.    Kidneys/ Ureters: Kidneys are normal in size and enhance symmetrically.  No nephrolithiasis or hydroureteronephrosis.    Bladder: Smooth contours without bladder wall thickening.    Reproductive organs: Unremarkable.    GI Tract/Mesentery: New/worse lower esophageal wall thickening with significant mucosal edema.  Minimal wall thickening at the gastric fundus, mildly improved from most recent prior.  Visualized loops of  small and large bowel are normal in caliber without evidence for obstruction or inflammation. Transverse colon wall thickening has improved.  Appendix is unremarkable.    No abdominopelvic ascites, intraperitoneal free air, or significant adenopathy.    Abdominal wall/extraperitoneal soft tissues: Unremarkable.    Vasculature: Abdominal aorta is normal in caliber, contour, and course without significant calcific atherosclerosis.  Retroaortic left renal vein.    Portal vein is patent.  Chronic splenic vein occlusion with numerous large collateral vessels in the upper abdomen.    Bones: No acute displaced fracture.                                        CT Head Without Contrast (Final result)  Result time 03/04/24 17:02:35      Final result by Fritz Son MD (03/04/24 17:02:35)                   Impression:      No acute hemorrhage or major vascular territory infarct.    Possible subtle sulcation abnormality with calcification in the right frontal lobe, similar to priors.  Appearance suggestive of a cortical malformation/developmental anomaly or possible remote insult.  MRI may be helpful for further characterization if warranted clinically.    This report was flagged in Epic as abnormal.    Electronically signed by resident: Darein Sprague  Date:    03/04/2024  Time:    16:44    Electronically signed by: Fritz Son MD  Date:    03/04/2024  Time:    17:02               Narrative:    EXAMINATION:  CT HEAD WITHOUT CONTRAST    CLINICAL HISTORY:  Syncope, recurrent;    TECHNIQUE:  Low dose axial CT images obtained throughout the head without the use of intravenous contrast.  Axial, sagittal and coronal reconstructions were performed.    COMPARISON:  CT head 12/16/2022    FINDINGS:  Intracranial compartment:    Ventricles and sulci are normal in size without evidence of hydrocephalus.    Apparent deep sulcus in the right frontal lobe (series 601, image 60; series 2, image 17).  Possible small focus underlying  white matter loss with punctate calcification.  Overall appearance unchanged from prior.  No new parenchymal hemorrhage, edema, mass effect or major vascular distribution infarct.    No extra-axial blood or fluid collections.    Skull/extracranial contents (limited evaluation):    No displaced calvarial fracture.    The mastoid air cells are clear.  Patchy mucosal thickening of the ethmoid air cells.  Remainder of the visualized paranasal sinuses are essentially clear.                                       X-Ray Knee 3 View Bilateral (Final result)  Result time 03/04/24 13:27:57   Procedure changed from X-Ray Knee 3 View Right     Final result by Abbe Meza MD (03/04/24 13:27:57)                   Impression:      See above      Electronically signed by: Abbe Meza MD  Date:    03/04/2024  Time:    13:27               Narrative:    EXAMINATION:  XR KNEE 3 VIEW BILATERAL    CLINICAL HISTORY:  Fall; Unspecified fall, initial encounter    TECHNIQUE:  AP, lateral, and Merchant views of both knees were performed.    COMPARISON:  None    FINDINGS:  No fracture or dislocation.  No bone destruction identified.  Slight soft tissue swelling noted anterior to the right knee joint.                                       X-Ray Elbow Complete Right (Final result)  Result time 03/04/24 13:35:52      Final result by Abbe Meza MD (03/04/24 13:35:52)                   Impression:      See above      Electronically signed by: Abbe Meza MD  Date:    03/04/2024  Time:    13:35               Narrative:    EXAMINATION:  XR ELBOW COMPLETE 3 VIEW RIGHT    CLINICAL HISTORY:  . Unspecified fall, initial encounter    TECHNIQUE:  AP, lateral, and oblique views of the right elbow were performed.    COMPARISON:  None    FINDINGS:  No fracture or dislocation.  No bone destruction identified                                        Assessment/Plan:      * Acute on chronic pancreatitis  -Afebrile, no leukocytosis.  -Lipase 31  -Lactic  2.3>0.7 s/p IVF.  -CT AP : Sequela pancreatitis with multiple fluid collections in the upper abdomen, some of which are new/worse and some which have improved from prior study.  New thin walled collection adjacent to the stomach measures up to 4 cm in size.   -Continue IVF.  -CLD for now, advance as tolerate.  -PRN pain and nausea control.  -Encouraged EtOH cessation.    High anion gap metabolic acidosis  -Bicarb 17, anion gap 18 on admit likely due to starvation ketoacidosis vs alcoholic ketoacidosis although patient denies significant EtOH consumption.  -Continue IVF.  -CLD.  -PRN antiemetics.  -Monitor on labs.    resolved    Chronic pain syndrome  -History noted.  -Currently on IV opiates in the setting of acute on chronic pancreatitis.    Pancreatic pseudocyst/cyst  -Chronic issue.  -CT AP: Sequela pancreatitis with multiple fluid collections in the upper abdomen, some of which are new/worse and some which have improved from prior study.  New thin walled collection adjacent to the stomach measures up to 4 cm in size.   AES consult, no acute intervention    Alcohol use disorder  Hx of EtOH use disorder. Reports cutting back on EtOH consumption since 9/2023 but still drinks rum or wine occasionally but unable to quantify how much or how often. States his last drink was a glass of wine about 3 days ago. Denies hx of ETOH withdrawal.  -EtOH level 27, UDS pending.  -PETH in process.  -Nursing to perform CIWA q shift although low concern for withdrawal  -Initiate folic acid, thiamine, and MVI.    -Discussed the dangers of continued ETOH use with Pt and apparent systemic effects of his abuse.      Splenic vein thrombosis  Patient has history of splenic vein thrombosis with gastric varices (11/2023 on CTA) in the setting of polycythemia vera and chronic pancreatitis. He is not currently on anticoagulation.     Mild intermittent asthma without complication  -No s/s of acute exacerbation.  -PRN albuterol  inhaler.    Hematemesis  Patient with history of known MW tear noted on previous EGD 9/2023. Patient says he has a small amount of blood in his vomit. He was told he didn't need to take protonix, so he has not taken it in a few months. Will restart PPI and give a dose of carafate for burning pain. Patient reports he has not vomited in several hours and is tolerating sips of water currently.     - restart protonix  - trend h/h    Syncope  Patient reports passing out while walking his dog last night. Endorses increased fatigue and generalized weakness due to significant nausea, vomiting, and decreased PO intake. Denies any significant chest pain, SOB, lightheadedness, or vision changes prior to the episode. Unsure if he struck his head but no obvious signs of head trauma on exam   Likely related to significant dehydration.   -Labs with high anion gap metabolic acidosis. Lactic 2.3>0.7. EtOH 27  -UA/UDS pending.  -EKG with ST, no acute ischemic changes  -Check orthostatics.  -CTH with no acute hemorrhage or major vascular territory infarct. Possible subtle sulcation abnormality with calcification in the right frontal lobe, similar to priors.  -Continue IVF.  -Tele-monitoring.   -Fall precautions.     Polycythemia vera  Currently stable, has not required phlebotomy for several months due to hematocrit within normal limits.   -Follows outpatient with hematology  -Not on AC due to hx of rectal bleeding     HIV infection  Patient was diagnosed with HIV 2 years ago and has been on anti retroviral therapy. He is sexually active with male partners. His most recent CD4 count was 1080 (10/2023).     - Continue home biktarvy       VTE Risk Mitigation (From admission, onward)           Ordered     IP VTE HIGH RISK PATIENT  Once         03/04/24 1825     Place sequential compression device  Until discontinued         03/04/24 1825                    Discharge Planning   CASTILLO: 3/8/2024     Code Status: Full Code   Is the patient  medically ready for discharge?:     Reason for patient still in hospital (select all that apply): Patient trending condition, Laboratory test, and Treatment  Discharge Plan A: Home with family                  Zuly Bae MD  Department of Hospital Medicine   [facility] S[facility] ([place])

## 2024-03-06 NOTE — SUBJECTIVE & OBJECTIVE
Interval history:  No overnight events.  Still continues to require IV pain medication.  However requesting to advance diet.  No nausea or vomiting.  Placed on soft diet.  Review of Systems   Constitutional:  Positive for fatigue. Negative for chills, diaphoresis and fever.   HENT:  Negative for congestion, postnasal drip and rhinorrhea.    Eyes:  Negative for photophobia.   Respiratory:  Negative for cough, shortness of breath and wheezing.    Cardiovascular:  Negative for palpitations and leg swelling. Chest pain: burning/cramping.  Gastrointestinal:  Negative for abdominal distention, blood in stool and diarrhea.   Genitourinary:  Negative for dysuria, frequency and hematuria.   Musculoskeletal:  Negative for back pain, gait problem and myalgias.   Skin:  Positive for wound. Negative for pallor and rash.   Neurological:  Negative for dizziness, facial asymmetry, speech difficulty, numbness and headaches. Weakness: generalized.  Psychiatric/Behavioral:  Negative for confusion and hallucinations. The patient is not nervous/anxious.      Objective:     Vital Signs (Most Recent):  Temp: 98.2 °F (36.8 °C) (03/06/24 1534)  Pulse: 80 (03/06/24 1534)  Resp: 16 (03/06/24 1535)  BP: 129/83 (03/06/24 1534)  SpO2: 96 % (03/06/24 1534) Vital Signs (24h Range):  Temp:  [98.2 °F (36.8 °C)-99.4 °F (37.4 °C)] 98.2 °F (36.8 °C)  Pulse:  [] 80  Resp:  [14-18] 16  SpO2:  [96 %-100 %] 96 %  BP: (122-131)/(74-88) 129/83     Weight: 63.5 kg (140 lb)  Body mass index is 20.09 kg/m².     Physical Exam  Vitals and nursing note reviewed.   Constitutional:       General: He is not in acute distress.     Appearance: He is not toxic-appearing or diaphoretic.   HENT:      Head: Normocephalic and atraumatic.      Nose: Nose normal.      Mouth/Throat:      Mouth: Mucous membranes are moist.   Eyes:      Pupils: Pupils are equal, round, and reactive to light.   Cardiovascular:      Rate and Rhythm: Normal rate and regular rhythm.       "Pulses: Normal pulses.   Pulmonary:      Effort: Pulmonary effort is normal. No respiratory distress.      Breath sounds: No wheezing, rhonchi or rales.   Abdominal:      General: Bowel sounds are normal. There is no distension.      Palpations: Abdomen is soft.      Tenderness: There is abdominal tenderness in the epigastric area, left upper quadrant and left lower quadrant. There is no guarding.   Musculoskeletal:         General: No swelling, tenderness or deformity. Normal range of motion.      Right elbow: Normal range of motion.      Cervical back: Normal range of motion.      Right knee: Normal range of motion.      Left knee: Normal range of motion.      Comments: Bruising and abrasions to misti knees and R elbow   Skin:     General: Skin is warm and dry.      Capillary Refill: Capillary refill takes less than 2 seconds.   Neurological:      General: No focal deficit present.      Mental Status: He is alert and oriented to person, place, and time.      Sensory: No sensory deficit.      Motor: No weakness.   Psychiatric:         Mood and Affect: Mood normal.         Behavior: Behavior normal.              CRANIAL NERVES     CN III, IV, VI   Pupils are equal, round, and reactive to light.       Significant Labs: All pertinent labs within the past 24 hours have been reviewed.  CBC:   Recent Labs   Lab 03/05/24  0338 03/06/24  0555   WBC 6.25 4.48   HGB 8.6* 9.3*   HCT 29.9* 32.2*    219       CMP:   Recent Labs   Lab 03/05/24  0338 03/06/24  0555   * 139   K 3.2* 3.4*    104   CO2 20* 27   GLU 90 94   BUN 7 4*   CREATININE 0.7 0.7   CALCIUM 8.2* 8.3*   PROT 6.0 5.8*   ALBUMIN 3.0* 2.8*   BILITOT 0.5 0.2   ALKPHOS 95 87   AST 54* 41*   ALT 9* 8*   ANIONGAP 9 8       Lactic Acid:   No results for input(s): "LACTATE" in the last 48 hours.    Lipase:   No results for input(s): "LIPASE" in the last 48 hours.      Significant Imaging: I have reviewed all pertinent imaging results/findings within the " past 24 hours.  Imaging Results              CT Abdomen Pelvis With IV Contrast NO Oral Contrast (Final result)  Result time 03/04/24 22:15:44      Final result by Reyes Gary MD (03/04/24 22:15:44)                   Impression:      1. Sequela pancreatitis with multiple fluid collections in the upper abdomen, some of which are new/worse and some which have improved from prior study.  New thin walled collection adjacent to the stomach measures up to 4 cm in size.  2. New/worse significant lower esophageal wall thickening.  Suggest correlation for any clinical signs of esophagitis and/or recent vomiting.  3. 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  4. Hepatosplenomegaly and probable hepatic steatosis.  5. Additional findings as detailed above.    Electronically signed by resident: Chrissy Beverly  Date:    03/04/2024  Time:    20:17    Electronically signed by: Reyes Gary MD  Date:    03/04/2024  Time:    22:15               Narrative:    EXAMINATION:  CT ABDOMEN PELVIS WITH IV CONTRAST    CLINICAL HISTORY:  Pancreatitis, acute, severe;    TECHNIQUE:  Low dose axial images, sagittal and coronal reformations were obtained from the lung bases to the pubic symphysis following the IV administration of 75 mL of Omnipaque 350.  Oral contrast was not given.    COMPARISON:  CTA abdomen pelvis 01/06/2024, 12/29/2023, 12/25/2023.    FINDINGS:  Heart: Unremarkable.    Lung bases: Left lower lobe subsegmental atelectasis.  No consolidation or pleural effusion.    Liver: Liver is enlarged measuring 20 cm in craniocaudal dimension.  No focal hepatic abnormality.    Gallbladder: Normal in appearance without evidence for cholecystitis.    Bile Ducts: No intra or extrahepatic biliary ductal dilation.    Pancreas: Similar mild peripancreatic fat stranding.    Peripancreatic collection is again noted, some of which are new or worse when compared with the prior exam.    New collection  along the greater curvature of the stomach measures approximately 4 x 3 x 4 cm in size (axial image 44 and coronal image 114).  This collection is relatively thin walled without internal air.    Small volume relatively unorganized fluid between the body of the pancreas and the inferior wall of the stomach.    Similar to mildly decreased size of small fluid collections along the anterior aspect of the pancreas (axial image 60)    Collection extending along the anterosuperior pancreas is relatively stable in size (axial image 54).    Spleen: Spleen is enlarged measuring 13 cm in craniocaudal dimension.    Adrenals: Unremarkable.    Kidneys/ Ureters: Kidneys are normal in size and enhance symmetrically.  No nephrolithiasis or hydroureteronephrosis.    Bladder: Smooth contours without bladder wall thickening.    Reproductive organs: Unremarkable.    GI Tract/Mesentery: New/worse lower esophageal wall thickening with significant mucosal edema.  Minimal wall thickening at the gastric fundus, mildly improved from most recent prior.  Visualized loops of small and large bowel are normal in caliber without evidence for obstruction or inflammation. Transverse colon wall thickening has improved.  Appendix is unremarkable.    No abdominopelvic ascites, intraperitoneal free air, or significant adenopathy.    Abdominal wall/extraperitoneal soft tissues: Unremarkable.    Vasculature: Abdominal aorta is normal in caliber, contour, and course without significant calcific atherosclerosis.  Retroaortic left renal vein.    Portal vein is patent.  Chronic splenic vein occlusion with numerous large collateral vessels in the upper abdomen.    Bones: No acute displaced fracture.                                        CT Head Without Contrast (Final result)  Result time 03/04/24 17:02:35      Final result by Fritz Son MD (03/04/24 17:02:35)                   Impression:      No acute hemorrhage or major vascular territory  infarct.    Possible subtle sulcation abnormality with calcification in the right frontal lobe, similar to priors.  Appearance suggestive of a cortical malformation/developmental anomaly or possible remote insult.  MRI may be helpful for further characterization if warranted clinically.    This report was flagged in Epic as abnormal.    Electronically signed by resident: Darien Sprague  Date:    03/04/2024  Time:    16:44    Electronically signed by: Fritz Son MD  Date:    03/04/2024  Time:    17:02               Narrative:    EXAMINATION:  CT HEAD WITHOUT CONTRAST    CLINICAL HISTORY:  Syncope, recurrent;    TECHNIQUE:  Low dose axial CT images obtained throughout the head without the use of intravenous contrast.  Axial, sagittal and coronal reconstructions were performed.    COMPARISON:  CT head 12/16/2022    FINDINGS:  Intracranial compartment:    Ventricles and sulci are normal in size without evidence of hydrocephalus.    Apparent deep sulcus in the right frontal lobe (series 601, image 60; series 2, image 17).  Possible small focus underlying white matter loss with punctate calcification.  Overall appearance unchanged from prior.  No new parenchymal hemorrhage, edema, mass effect or major vascular distribution infarct.    No extra-axial blood or fluid collections.    Skull/extracranial contents (limited evaluation):    No displaced calvarial fracture.    The mastoid air cells are clear.  Patchy mucosal thickening of the ethmoid air cells.  Remainder of the visualized paranasal sinuses are essentially clear.                                       X-Ray Knee 3 View Bilateral (Final result)  Result time 03/04/24 13:27:57   Procedure changed from X-Ray Knee 3 View Right     Final result by Abbe Meza MD (03/04/24 13:27:57)                   Impression:      See above      Electronically signed by: Abbe Meza MD  Date:    03/04/2024  Time:    13:27               Narrative:    EXAMINATION:  XR KNEE 3 VIEW  BILATERAL    CLINICAL HISTORY:  Fall; Unspecified fall, initial encounter    TECHNIQUE:  AP, lateral, and Merchant views of both knees were performed.    COMPARISON:  None    FINDINGS:  No fracture or dislocation.  No bone destruction identified.  Slight soft tissue swelling noted anterior to the right knee joint.                                       X-Ray Elbow Complete Right (Final result)  Result time 03/04/24 13:35:52      Final result by Abbe Meza MD (03/04/24 13:35:52)                   Impression:      See above      Electronically signed by: Abbe Meza MD  Date:    03/04/2024  Time:    13:35               Narrative:    EXAMINATION:  XR ELBOW COMPLETE 3 VIEW RIGHT    CLINICAL HISTORY:  . Unspecified fall, initial encounter    TECHNIQUE:  AP, lateral, and oblique views of the right elbow were performed.    COMPARISON:  None    FINDINGS:  No fracture or dislocation.  No bone destruction identified

## 2024-03-06 NOTE — PLAN OF CARE
Problem: Adult Inpatient Plan of Care  Goal: Optimal Comfort and Wellbeing  Outcome: Ongoing, Progressing     Problem: Pain Acute  Goal: Acceptable Pain Control and Functional Ability  Outcome: Ongoing, Progressing

## 2024-03-07 ENCOUNTER — ANESTHESIA EVENT (OUTPATIENT)
Dept: ENDOSCOPY | Facility: HOSPITAL | Age: 24
DRG: 439 | End: 2024-03-07
Payer: MEDICAID

## 2024-03-07 PROBLEM — R07.9 CHEST PAIN: Status: ACTIVE | Noted: 2023-09-22

## 2024-03-07 LAB
ALBUMIN SERPL BCP-MCNC: 2.8 G/DL (ref 3.5–5.2)
ALP SERPL-CCNC: 77 U/L (ref 55–135)
ALT SERPL W/O P-5'-P-CCNC: 10 U/L (ref 10–44)
ANION GAP SERPL CALC-SCNC: 9 MMOL/L (ref 8–16)
AST SERPL-CCNC: 49 U/L (ref 10–40)
BASOPHILS # BLD AUTO: 0.03 K/UL (ref 0–0.2)
BASOPHILS NFR BLD: 0.6 % (ref 0–1.9)
BILIRUB SERPL-MCNC: 0.2 MG/DL (ref 0.1–1)
BUN SERPL-MCNC: 4 MG/DL (ref 6–20)
CALCIUM SERPL-MCNC: 8.7 MG/DL (ref 8.7–10.5)
CHLORIDE SERPL-SCNC: 104 MMOL/L (ref 95–110)
CO2 SERPL-SCNC: 25 MMOL/L (ref 23–29)
CREAT SERPL-MCNC: 0.7 MG/DL (ref 0.5–1.4)
DIFFERENTIAL METHOD BLD: ABNORMAL
EOSINOPHIL # BLD AUTO: 0.2 K/UL (ref 0–0.5)
EOSINOPHIL NFR BLD: 4.1 % (ref 0–8)
ERYTHROCYTE [DISTWIDTH] IN BLOOD BY AUTOMATED COUNT: 17.8 % (ref 11.5–14.5)
EST. GFR  (NO RACE VARIABLE): >60 ML/MIN/1.73 M^2
GLUCOSE SERPL-MCNC: 102 MG/DL (ref 70–110)
HCT VFR BLD AUTO: 30.9 % (ref 40–54)
HGB BLD-MCNC: 8.8 G/DL (ref 14–18)
IMM GRANULOCYTES # BLD AUTO: 0.01 K/UL (ref 0–0.04)
IMM GRANULOCYTES NFR BLD AUTO: 0.2 % (ref 0–0.5)
LYMPHOCYTES # BLD AUTO: 2 K/UL (ref 1–4.8)
LYMPHOCYTES NFR BLD: 39.3 % (ref 18–48)
MAGNESIUM SERPL-MCNC: 2 MG/DL (ref 1.6–2.6)
MCH RBC QN AUTO: 22.7 PG (ref 27–31)
MCHC RBC AUTO-ENTMCNC: 28.5 G/DL (ref 32–36)
MCV RBC AUTO: 80 FL (ref 82–98)
MONOCYTES # BLD AUTO: 0.5 K/UL (ref 0.3–1)
MONOCYTES NFR BLD: 9.5 % (ref 4–15)
NEUTROPHILS # BLD AUTO: 2.4 K/UL (ref 1.8–7.7)
NEUTROPHILS NFR BLD: 46.3 % (ref 38–73)
NRBC BLD-RTO: 0 /100 WBC
PLATELET # BLD AUTO: 158 K/UL (ref 150–450)
PMV BLD AUTO: 11.4 FL (ref 9.2–12.9)
POTASSIUM SERPL-SCNC: 4.6 MMOL/L (ref 3.5–5.1)
PROT SERPL-MCNC: 6 G/DL (ref 6–8.4)
RBC # BLD AUTO: 3.87 M/UL (ref 4.6–6.2)
SODIUM SERPL-SCNC: 138 MMOL/L (ref 136–145)
WBC # BLD AUTO: 5.16 K/UL (ref 3.9–12.7)

## 2024-03-07 PROCEDURE — 83735 ASSAY OF MAGNESIUM: CPT | Performed by: NURSE PRACTITIONER

## 2024-03-07 PROCEDURE — 63600175 PHARM REV CODE 636 W HCPCS: Performed by: HOSPITALIST

## 2024-03-07 PROCEDURE — 85025 COMPLETE CBC W/AUTO DIFF WBC: CPT | Performed by: NURSE PRACTITIONER

## 2024-03-07 PROCEDURE — 25000003 PHARM REV CODE 250: Performed by: NURSE PRACTITIONER

## 2024-03-07 PROCEDURE — 25000003 PHARM REV CODE 250

## 2024-03-07 PROCEDURE — 80053 COMPREHEN METABOLIC PANEL: CPT | Performed by: NURSE PRACTITIONER

## 2024-03-07 PROCEDURE — 63600175 PHARM REV CODE 636 W HCPCS: Performed by: NURSE PRACTITIONER

## 2024-03-07 PROCEDURE — 0DJ08ZZ INSPECTION OF UPPER INTESTINAL TRACT, VIA NATURAL OR ARTIFICIAL OPENING ENDOSCOPIC: ICD-10-PCS | Performed by: STUDENT IN AN ORGANIZED HEALTH CARE EDUCATION/TRAINING PROGRAM

## 2024-03-07 PROCEDURE — 25000003 PHARM REV CODE 250: Performed by: HOSPITALIST

## 2024-03-07 PROCEDURE — C9113 INJ PANTOPRAZOLE SODIUM, VIA: HCPCS | Performed by: HOSPITALIST

## 2024-03-07 PROCEDURE — 11000001 HC ACUTE MED/SURG PRIVATE ROOM

## 2024-03-07 PROCEDURE — 36415 COLL VENOUS BLD VENIPUNCTURE: CPT | Performed by: NURSE PRACTITIONER

## 2024-03-07 RX ORDER — PANTOPRAZOLE SODIUM 40 MG/10ML
40 INJECTION, POWDER, LYOPHILIZED, FOR SOLUTION INTRAVENOUS 2 TIMES DAILY
Status: DISCONTINUED | OUTPATIENT
Start: 2024-03-07 | End: 2024-03-08

## 2024-03-07 RX ORDER — POLYETHYLENE GLYCOL 3350 17 G/17G
17 POWDER, FOR SOLUTION ORAL 2 TIMES DAILY PRN
Status: DISCONTINUED | OUTPATIENT
Start: 2024-03-07 | End: 2024-03-10 | Stop reason: HOSPADM

## 2024-03-07 RX ORDER — ALPRAZOLAM 0.5 MG/1
0.5 TABLET ORAL 3 TIMES DAILY PRN
Status: DISCONTINUED | OUTPATIENT
Start: 2024-03-07 | End: 2024-03-10 | Stop reason: HOSPADM

## 2024-03-07 RX ORDER — HYDROMORPHONE HYDROCHLORIDE 2 MG/1
2 TABLET ORAL
Status: DISCONTINUED | OUTPATIENT
Start: 2024-03-07 | End: 2024-03-10

## 2024-03-07 RX ORDER — HYDROMORPHONE HYDROCHLORIDE 1 MG/ML
1.5 INJECTION, SOLUTION INTRAMUSCULAR; INTRAVENOUS; SUBCUTANEOUS EVERY 4 HOURS PRN
Status: DISCONTINUED | OUTPATIENT
Start: 2024-03-07 | End: 2024-03-10

## 2024-03-07 RX ADMIN — THERA TABS 1 TABLET: TAB at 09:03

## 2024-03-07 RX ADMIN — SUCRALFATE 1 G: 1 SUSPENSION ORAL at 11:03

## 2024-03-07 RX ADMIN — SENNOSIDES AND DOCUSATE SODIUM 2 TABLET: 8.6; 5 TABLET ORAL at 09:03

## 2024-03-07 RX ADMIN — HYDROMORPHONE HYDROCHLORIDE 0.5 MG: 0.5 INJECTION, SOLUTION INTRAMUSCULAR; INTRAVENOUS; SUBCUTANEOUS at 04:03

## 2024-03-07 RX ADMIN — SODIUM CHLORIDE, POTASSIUM CHLORIDE, SODIUM LACTATE AND CALCIUM CHLORIDE: 600; 310; 30; 20 INJECTION, SOLUTION INTRAVENOUS at 06:03

## 2024-03-07 RX ADMIN — Medication 100 MG: at 09:03

## 2024-03-07 RX ADMIN — HYDROMORPHONE HYDROCHLORIDE 2 MG: 2 TABLET ORAL at 06:03

## 2024-03-07 RX ADMIN — SODIUM CHLORIDE, POTASSIUM CHLORIDE, SODIUM LACTATE AND CALCIUM CHLORIDE: 600; 310; 30; 20 INJECTION, SOLUTION INTRAVENOUS at 11:03

## 2024-03-07 RX ADMIN — SENNOSIDES AND DOCUSATE SODIUM 2 TABLET: 8.6; 5 TABLET ORAL at 08:03

## 2024-03-07 RX ADMIN — HYDROMORPHONE HYDROCHLORIDE 1.5 MG: 1 INJECTION, SOLUTION INTRAMUSCULAR; INTRAVENOUS; SUBCUTANEOUS at 08:03

## 2024-03-07 RX ADMIN — SUCRALFATE 1 G: 1 SUSPENSION ORAL at 05:03

## 2024-03-07 RX ADMIN — BICTEGRAVIR SODIUM, EMTRICITABINE, AND TENOFOVIR ALAFENAMIDE FUMARATE 1 TABLET: 50; 200; 25 TABLET ORAL at 09:03

## 2024-03-07 RX ADMIN — HYDROMORPHONE HYDROCHLORIDE 1 MG: 1 INJECTION, SOLUTION INTRAMUSCULAR; INTRAVENOUS; SUBCUTANEOUS at 03:03

## 2024-03-07 RX ADMIN — METHOCARBAMOL 500 MG: 500 TABLET ORAL at 08:03

## 2024-03-07 RX ADMIN — METHOCARBAMOL 500 MG: 500 TABLET ORAL at 09:03

## 2024-03-07 RX ADMIN — HYDROMORPHONE HYDROCHLORIDE 2 MG: 2 TABLET ORAL at 02:03

## 2024-03-07 RX ADMIN — HYDROMORPHONE HYDROCHLORIDE 0.5 MG: 0.5 INJECTION, SOLUTION INTRAMUSCULAR; INTRAVENOUS; SUBCUTANEOUS at 12:03

## 2024-03-07 RX ADMIN — PANTOPRAZOLE SODIUM 40 MG: 40 TABLET, DELAYED RELEASE ORAL at 09:03

## 2024-03-07 RX ADMIN — FOLIC ACID 1 MG: 1 TABLET ORAL at 09:03

## 2024-03-07 RX ADMIN — METHOCARBAMOL 500 MG: 500 TABLET ORAL at 05:03

## 2024-03-07 RX ADMIN — PANTOPRAZOLE SODIUM 40 MG: 40 INJECTION, POWDER, FOR SOLUTION INTRAVENOUS at 08:03

## 2024-03-07 RX ADMIN — PROCHLORPERAZINE EDISYLATE 5 MG: 5 INJECTION INTRAMUSCULAR; INTRAVENOUS at 12:03

## 2024-03-07 RX ADMIN — HYDROMORPHONE HYDROCHLORIDE 1.5 MG: 1 INJECTION, SOLUTION INTRAMUSCULAR; INTRAVENOUS; SUBCUTANEOUS at 12:03

## 2024-03-07 RX ADMIN — ALPRAZOLAM 0.5 MG: 0.5 TABLET ORAL at 11:03

## 2024-03-07 RX ADMIN — HYDROMORPHONE HYDROCHLORIDE 2 MG: 2 TABLET ORAL at 11:03

## 2024-03-07 RX ADMIN — HYDROMORPHONE HYDROCHLORIDE 1.5 MG: 1 INJECTION, SOLUTION INTRAMUSCULAR; INTRAVENOUS; SUBCUTANEOUS at 04:03

## 2024-03-07 RX ADMIN — HYDROMORPHONE HYDROCHLORIDE 0.5 MG: 0.5 INJECTION, SOLUTION INTRAMUSCULAR; INTRAVENOUS; SUBCUTANEOUS at 09:03

## 2024-03-07 RX ADMIN — Medication 6 MG: at 11:03

## 2024-03-07 RX ADMIN — ALPRAZOLAM 0.5 MG: 0.5 TABLET ORAL at 07:03

## 2024-03-07 RX ADMIN — SUCRALFATE 1 G: 1 SUSPENSION ORAL at 12:03

## 2024-03-07 RX ADMIN — PREGABALIN 75 MG: 75 CAPSULE ORAL at 08:03

## 2024-03-07 RX ADMIN — HYDROMORPHONE HYDROCHLORIDE 1 MG: 1 INJECTION, SOLUTION INTRAMUSCULAR; INTRAVENOUS; SUBCUTANEOUS at 08:03

## 2024-03-07 RX ADMIN — PROCHLORPERAZINE EDISYLATE 5 MG: 5 INJECTION INTRAMUSCULAR; INTRAVENOUS at 11:03

## 2024-03-07 RX ADMIN — PREGABALIN 75 MG: 75 CAPSULE ORAL at 09:03

## 2024-03-07 RX ADMIN — METHOCARBAMOL 500 MG: 500 TABLET ORAL at 01:03

## 2024-03-07 RX ADMIN — SODIUM CHLORIDE, POTASSIUM CHLORIDE, SODIUM LACTATE AND CALCIUM CHLORIDE: 600; 310; 30; 20 INJECTION, SOLUTION INTRAVENOUS at 05:03

## 2024-03-07 NOTE — PROGRESS NOTES
[facility] ([address] Medicine  Progress Note    Patient Name: Tasia Cardona  MRN: [id number]  Patient Class: IP- Inpatient   Admission Date: 3/4/2024  Length of Stay: 3 days  Attending Physician: Brigida Barrow MD  Primary Care Provider: Pina Jones NP        Subjective:     Principal Problem:Acute on chronic pancreatitis        HPI:  Tasia Cardona is a 23 y.o. male with a PMHx of alcohol use disorder, HIV, chronic pancreatitis, asthma, splenic vein thrombosis, anemia, and polycythemia vera who presents to the ED with complaints of abdominal pain, nausea, and vomiting x3 days. States that this feels similar to his pancreatitis flares. Patient reports multiple episodes of bilious, bloody tinged emesis for the last 3 days.Patient specifies abdominal pain is in his LUQ and LLQ that intermittently radiates to shoulder. Patient also endorses chest pain he describes as cramping and burning which he associates with the increased strain of emesis. Patient states that he fainted while walking his dog last evening. Patient denies any dizziness or visual field disturbances preceding fall but endorses increased fatigue x 3-4 days and does not believe he hit his head. Patient with scant bruising to bilateral knees and elbow. Patient states nausea, abd pain, and fatigue are mildly improved since arrival. He denies fever/chills, SOB, cough, or dysuria. Patient with prior alcohol use disorder diagnosis. States last drink was 1 glass of red wine 3-4 days PTA.Patient denies any recent binge drinking episodes.    In the ED, patient tachycardic otherwise vitals stable, afebrile. CBC with stable anemia. Bicarb 17. Anion gap 18. Alk phos 136. ALT 59. Lipase 31. Lactic 2.3>0.7. PETH pending. ETOH 27. EKG with ST, rate 115, no acute ischemic changes. UA pending. Xray R elbow with no fracture or dislocation. No bone destruction identified. Xray misti knees with no fracture or dislocation. No bone  destruction identified. Slight soft tissue swelling noted anterior to the right knee joint. CTH with no acute hemorrhage or major vascular territory infarct. Possible subtle sulcation abnormality with calcification in the right frontal lobe, similar to priors.  Appearance suggestive of a cortical malformation/developmental anomaly or possible remote insult. The patient received valium, droperidol x2, morphine x2, dilaudid, 2L NS, MVI, and thiamine.    Overview/Hospital Course:  Patient admitted for management of pancreatitis.  Patient requesting advancing diet, significant improvement in pain.  Diet advanced to soft diet.  AES consulted for pancreatic cyst.  UDS was positive for cocaine, opiates and marijuana.  Discussed with AES team, no emergent need for EGD or draining any of the pancreatic cyst.  Patient continues to have pain requiring IV pain medication.    Interval History:   3/7: Notes persistence of abdominal pain. Blood in emesis.       Review of Systems   Constitutional:  Positive for fatigue. Negative for chills, diaphoresis and fever.   HENT:  Negative for congestion, postnasal drip and rhinorrhea.    Eyes:  Negative for photophobia.   Respiratory:  Negative for cough, shortness of breath and wheezing.    Cardiovascular:  Negative for palpitations and leg swelling. Chest pain: burning/cramping.  Gastrointestinal:  Negative for abdominal distention, blood in stool and diarrhea.   Genitourinary:  Negative for dysuria, frequency and hematuria.   Musculoskeletal:  Negative for back pain, gait problem and myalgias.   Skin:  Positive for wound. Negative for pallor and rash.   Neurological:  Negative for dizziness, facial asymmetry, speech difficulty, numbness and headaches. Weakness: generalized.  Psychiatric/Behavioral:  Negative for confusion and hallucinations. The patient is not nervous/anxious.      Objective:     Vital Signs (Most Recent):  Temp: 98.2 °F (36.8 °C) (03/07/24 1112)  Pulse: 89 (03/07/24  1112)  Resp: 17 (03/07/24 1208)  BP: (!) 140/91 (03/07/24 1112)  SpO2: 97 % (03/07/24 1112) Vital Signs (24h Range):  Temp:  [98 °F (36.7 °C)-98.9 °F (37.2 °C)] 98.2 °F (36.8 °C)  Pulse:  [65-96] 89  Resp:  [16-18] 17  SpO2:  [96 %-99 %] 97 %  BP: (129-141)/(80-95) 140/91     Weight: 63.5 kg (140 lb)  Body mass index is 20.09 kg/m².    Intake/Output Summary (Last 24 hours) at 3/7/2024 1413  Last data filed at 3/7/2024 1317  Gross per 24 hour   Intake 2704.69 ml   Output --   Net 2704.69 ml      Physical Exam  Vitals and nursing note reviewed.   Constitutional:       General: He is not in acute distress.     Appearance: He is not toxic-appearing or diaphoretic.   HENT:      Head: Normocephalic and atraumatic.      Nose: Nose normal.      Mouth/Throat:      Mouth: Mucous membranes are moist.   Eyes:      Pupils: Pupils are equal, round, and reactive to light.   Cardiovascular:      Rate and Rhythm: Normal rate and regular rhythm.      Pulses: Normal pulses.   Pulmonary:      Effort: Pulmonary effort is normal. No respiratory distress.      Breath sounds: No wheezing, rhonchi or rales.   Abdominal:      General: Bowel sounds are normal. There is no distension.      Palpations: Abdomen is soft.      Tenderness: There is abdominal tenderness in the epigastric area, left upper quadrant and left lower quadrant. There is no guarding.   Musculoskeletal:         General: No swelling, tenderness or deformity. Normal range of motion.      Right elbow: Normal range of motion.      Cervical back: Normal range of motion.      Right knee: Normal range of motion.      Left knee: Normal range of motion.      Comments: Bruising and abrasions to misti knees and R elbow   Skin:     General: Skin is warm and dry.      Capillary Refill: Capillary refill takes less than 2 seconds.   Neurological:      General: No focal deficit present.      Mental Status: He is alert and oriented to person, place, and time.      Sensory: No sensory deficit.       Motor: No weakness.   Psychiatric:         Mood and Affect: Mood normal.         Behavior: Behavior normal.         MELD 3.0: 8 at 12/15/2023  3:50 AM  MELD-Na: 7 at 12/15/2023  3:50 AM  Calculated from:  Serum Creatinine: 0.5 mg/dL (Using min of 1 mg/dL) at 12/15/2023  3:50 AM  Serum Sodium: 138 mmol/L (Using max of 137 mmol/L) at 12/15/2023  3:50 AM  Total Bilirubin: 0.2 mg/dL (Using min of 1 mg/dL) at 12/15/2023  3:50 AM  Serum Albumin: 2.7 g/dL at 12/15/2023  3:50 AM  INR(ratio): 1.1 at 12/13/2023  8:30 AM  Age at listing (hypothetical): 23 years  Sex: Male at 12/15/2023  3:50 AM      Significant Labs:  CBC:  Recent Labs   Lab 03/06/24  0555 03/07/24  0248   WBC 4.48 5.16   HGB 9.3* 8.8*   HCT 32.2* 30.9*    158     CMP:  Recent Labs   Lab 03/06/24  0555 03/07/24  0248    138   K 3.4* 4.6    104   CO2 27 25   GLU 94 102   BUN 4* 4*   CREATININE 0.7 0.7   CALCIUM 8.3* 8.7   PROT 5.8* 6.0   ALBUMIN 2.8* 2.8*   BILITOT 0.2 0.2   ALKPHOS 87 77   AST 41* 49*   ALT 8* 10   ANIONGAP 8 9       Assessment/Plan:      * Acute on chronic pancreatitis  -Afebrile, no leukocytosis.  -Lipase 31  -Lactic 2.3>0.7 s/p IVF.  -CT AP : Sequela pancreatitis with multiple fluid collections in the upper abdomen, some of which are new/worse and some which have improved from prior study.  New thin walled collection adjacent to the stomach measures up to 4 cm in size.   -Continue IVF.  -CLD for now, advance as tolerate.  -PRN pain and nausea control.  -Encouraged EtOH cessation.    High anion gap metabolic acidosis  -Bicarb 17, anion gap 18 on admit likely due to starvation ketoacidosis vs alcoholic ketoacidosis although patient denies significant EtOH consumption.  -Continue IVF.  -PRN antiemetics.  -Monitor on labs.  - EGD for 2/8    resolved    Chronic pain syndrome  -History noted.  -Currently on IV opiates in the setting of acute on chronic pancreatitis.    Pancreatic pseudocyst/cyst  -Chronic issue.  -CT AP:  Sequela pancreatitis with multiple fluid collections in the upper abdomen, some of which are new/worse and some which have improved from prior study.  New thin walled collection adjacent to the stomach measures up to 4 cm in size.   AES consult, no acute intervention    Alcohol use disorder  Hx of EtOH use disorder. Reports cutting back on EtOH consumption since 9/2023 but still drinks rum or wine occasionally but unable to quantify how much or how often. States his last drink was a glass of wine about 3 days ago. Denies hx of ETOH withdrawal.  -EtOH level 27, UDS pending.  -PETH in process.  -Nursing to perform CIWA q shift although low concern for withdrawal  -Initiate folic acid, thiamine, and MVI.    -Discussed the dangers of continued ETOH use with Pt and apparent systemic effects of his abuse.      Splenic vein thrombosis  Patient has history of splenic vein thrombosis with gastric varices (11/2023 on CTA) in the setting of polycythemia vera and chronic pancreatitis. He is not currently on anticoagulation.     Mild intermittent asthma without complication  -No s/s of acute exacerbation.  -PRN albuterol inhaler.    Hematemesis  Patient with history of known MW tear noted on previous EGD 9/2023. Patient says he has a small amount of blood in his vomit. He was told he didn't need to take protonix, so he has not taken it in a few months. Will restart PPI and give a dose of carafate for burning pain. Patient reports he has not vomited in several hours and is tolerating sips of water currently.     - restart protonix  - trend h/h    Syncope  Patient reports passing out while walking his dog last night. Endorses increased fatigue and generalized weakness due to significant nausea, vomiting, and decreased PO intake. Denies any significant chest pain, SOB, lightheadedness, or vision changes prior to the episode. Unsure if he struck his head but no obvious signs of head trauma on exam   Likely related to significant  dehydration.   -Labs with high anion gap metabolic acidosis. Lactic 2.3>0.7. EtOH 27  -UA/UDS pending.  -EKG with ST, no acute ischemic changes  -Check orthostatics.  -CTH with no acute hemorrhage or major vascular territory infarct. Possible subtle sulcation abnormality with calcification in the right frontal lobe, similar to priors.  -Continue IVF.  -Tele-monitoring.   -Fall precautions.     Polycythemia vera  Currently stable, has not required phlebotomy for several months due to hematocrit within normal limits.   -Follows outpatient with hematology  -Not on AC due to hx of rectal bleeding     HIV infection  Patient was diagnosed with HIV 2 years ago and has been on anti retroviral therapy. He is sexually active with male partners. His most recent CD4 count was 1080 (10/2023).     - Continue home biktarvy       VTE Risk Mitigation (From admission, onward)           Ordered     IP VTE HIGH RISK PATIENT  Once         03/04/24 1825     Place sequential compression device  Until discontinued         03/04/24 1825                    Discharge Planning   CASTILLO: 3/8/2024     Code Status: Full Code   Is the patient medically ready for discharge?:     Reason for patient still in hospital (select all that apply): Patient trending condition  Discharge Plan A: Home with family                  Briigda Barrow MD  Department of Hospital Medicine   [facility] - [facility] ([id number])

## 2024-03-07 NOTE — ASSESSMENT & PLAN NOTE
-Bicarb 17, anion gap 18 on admit likely due to starvation ketoacidosis vs alcoholic ketoacidosis although patient denies significant EtOH consumption.  -Continue IVF.  -PRN antiemetics.  -Monitor on labs.  - EGD for 2/8    resolved

## 2024-03-07 NOTE — SUBJECTIVE & OBJECTIVE
Interval History:   3/7: Notes persistence of abdominal pain. Blood in emesis.       Review of Systems   Constitutional:  Positive for fatigue. Negative for chills, diaphoresis and fever.   HENT:  Negative for congestion, postnasal drip and rhinorrhea.    Eyes:  Negative for photophobia.   Respiratory:  Negative for cough, shortness of breath and wheezing.    Cardiovascular:  Negative for palpitations and leg swelling. Chest pain: burning/cramping.  Gastrointestinal:  Negative for abdominal distention, blood in stool and diarrhea.   Genitourinary:  Negative for dysuria, frequency and hematuria.   Musculoskeletal:  Negative for back pain, gait problem and myalgias.   Skin:  Positive for wound. Negative for pallor and rash.   Neurological:  Negative for dizziness, facial asymmetry, speech difficulty, numbness and headaches. Weakness: generalized.  Psychiatric/Behavioral:  Negative for confusion and hallucinations. The patient is not nervous/anxious.      Objective:     Vital Signs (Most Recent):  Temp: 98.2 °F (36.8 °C) (03/07/24 1112)  Pulse: 89 (03/07/24 1112)  Resp: 17 (03/07/24 1208)  BP: (!) 140/91 (03/07/24 1112)  SpO2: 97 % (03/07/24 1112) Vital Signs (24h Range):  Temp:  [98 °F (36.7 °C)-98.9 °F (37.2 °C)] 98.2 °F (36.8 °C)  Pulse:  [65-96] 89  Resp:  [16-18] 17  SpO2:  [96 %-99 %] 97 %  BP: (129-141)/(80-95) 140/91     Weight: 63.5 kg (140 lb)  Body mass index is 20.09 kg/m².    Intake/Output Summary (Last 24 hours) at 3/7/2024 1413  Last data filed at 3/7/2024 1317  Gross per 24 hour   Intake 2704.69 ml   Output --   Net 2704.69 ml      Physical Exam  Vitals and nursing note reviewed.   Constitutional:       General: He is not in acute distress.     Appearance: He is not toxic-appearing or diaphoretic.   HENT:      Head: Normocephalic and atraumatic.      Nose: Nose normal.      Mouth/Throat:      Mouth: Mucous membranes are moist.   Eyes:      Pupils: Pupils are equal, round, and reactive to light.    Cardiovascular:      Rate and Rhythm: Normal rate and regular rhythm.      Pulses: Normal pulses.   Pulmonary:      Effort: Pulmonary effort is normal. No respiratory distress.      Breath sounds: No wheezing, rhonchi or rales.   Abdominal:      General: Bowel sounds are normal. There is no distension.      Palpations: Abdomen is soft.      Tenderness: There is abdominal tenderness in the epigastric area, left upper quadrant and left lower quadrant. There is no guarding.   Musculoskeletal:         General: No swelling, tenderness or deformity. Normal range of motion.      Right elbow: Normal range of motion.      Cervical back: Normal range of motion.      Right knee: Normal range of motion.      Left knee: Normal range of motion.      Comments: Bruising and abrasions to misti knees and R elbow   Skin:     General: Skin is warm and dry.      Capillary Refill: Capillary refill takes less than 2 seconds.   Neurological:      General: No focal deficit present.      Mental Status: He is alert and oriented to person, place, and time.      Sensory: No sensory deficit.      Motor: No weakness.   Psychiatric:         Mood and Affect: Mood normal.         Behavior: Behavior normal.         MELD 3.0: 8 at 12/15/2023  3:50 AM  MELD-Na: 7 at 12/15/2023  3:50 AM  Calculated from:  Serum Creatinine: 0.5 mg/dL (Using min of 1 mg/dL) at 12/15/2023  3:50 AM  Serum Sodium: 138 mmol/L (Using max of 137 mmol/L) at 12/15/2023  3:50 AM  Total Bilirubin: 0.2 mg/dL (Using min of 1 mg/dL) at 12/15/2023  3:50 AM  Serum Albumin: 2.7 g/dL at 12/15/2023  3:50 AM  INR(ratio): 1.1 at 12/13/2023  8:30 AM  Age at listing (hypothetical): 23 years  Sex: Male at 12/15/2023  3:50 AM      Significant Labs:  CBC:  Recent Labs   Lab 03/06/24  0555 03/07/24  0248   WBC 4.48 5.16   HGB 9.3* 8.8*   HCT 32.2* 30.9*    158     CMP:  Recent Labs   Lab 03/06/24  0555 03/07/24  0248    138   K 3.4* 4.6    104   CO2 27 25   GLU 94 102   BUN 4*  4*   CREATININE 0.7 0.7   CALCIUM 8.3* 8.7   PROT 5.8* 6.0   ALBUMIN 2.8* 2.8*   BILITOT 0.2 0.2   ALKPHOS 87 77   AST 41* 49*   ALT 8* 10   ANIONGAP 8 9

## 2024-03-07 NOTE — TREATMENT PLAN
[facility]  AES  Treatment Plan    Patient Name: Tasia Cardona  MRN: [id number]  Admission Date: 3/4/2024  Hospital Length of Stay: 3 days    Subjective:     Interval History:  Continues to have hematemesis with downtrend in Hgb. Last episode was last night. CT scan with possible esophagitis.    Patient has had MWT before 09/2023    No plans for EUS cystgastrostomy at this time    No current facility-administered medications on file prior to encounter.     Current Outpatient Medications on File Prior to Encounter   Medication Sig Dispense Refill    wimaouzbw-tzmkidoz-odjkxne ala (BIKTARVY) -25 mg (25 kg or greater) Take 1 tablet by mouth once daily. 30 tablet 11    folic acid (FOLVITE) 1 MG tablet Take 1 tablet (1 mg total) by mouth once daily. 90 tablet 0    HYDROmorphone (DILAUDID) 8 MG tablet Take 6 mg by mouth every 4 (four) hours as needed for Pain.      omeprazole (PRILOSEC) 20 MG capsule Take 1 capsule (20 mg total) by mouth once daily. 30 capsule 11    oxyCODONE (ROXICODONE) 5 MG immediate release tablet Take 5 mg by mouth every 6 (six) hours as needed.      pantoprazole (PROTONIX) 40 MG tablet Take 1 tablet (40 mg total) by mouth 2 (two) times daily. 180 tablet 0    pregabalin (LYRICA) 150 MG capsule Take 1 capsule (150 mg total) by mouth 2 (two) times daily. for 14 days 28 capsule 0    promethazine (PHENERGAN) 25 MG suppository Place 1 suppository (25 mg total) rectally every 6 (six) hours as needed for Nausea. 12 suppository 0    promethazine (PHENERGAN) 25 MG suppository Place 1 suppository (25 mg total) rectally every 6 (six) hours as needed for Nausea. 10 suppository 0    senna (SENOKOT) 8.6 mg tablet Take 1 tablet by mouth every evening.      senna-docusate 8.6-50 mg (PERICOLACE) 8.6-50 mg per tablet Take 2 tablets by mouth 2 (two) times daily as needed for Constipation. 60 tablet 0       Review of patient's allergies indicates:   Allergen Reactions    [place]hler Swelling    Pecan  nut Swelling    Penicillins Hives     Tolerates cephalosporins    Tolerated piperacillin/tazobactam 11/2023    Soy Other (See Comments)    Sulfa (sulfonamide antibiotics) Hives         Objective:     Vitals:    03/07/24 0924   BP:    Pulse:    Resp: 17   Temp:        Constitutional:  not in acute distress and well developed  HENT: Head: Normal, normocephalic, atraumatic.  Eyes: conjunctiva clear and sclera nonicteric  Respiratory: normal chest expansion & respiratory effort   and no accessory muscle use  GI: soft and tenderness mild in the epigastrium  Skin: normal color  Neurological: alert    Significant Labs:  Recent Labs   Lab 03/05/24  0338 03/06/24  0555 03/07/24  0248   HGB 8.6* 9.3* 8.8*       Lab Results   Component Value Date    WBC 5.16 03/07/2024    HGB 8.8 (L) 03/07/2024    HCT 30.9 (L) 03/07/2024    MCV 80 (L) 03/07/2024     03/07/2024       Lab Results   Component Value Date     03/07/2024    K 4.6 03/07/2024     03/07/2024    CO2 25 03/07/2024    BUN 4 (L) 03/07/2024    CREATININE 0.7 03/07/2024    CALCIUM 8.7 03/07/2024    ANIONGAP 9 03/07/2024    ESTGFRAFRICA >60.0 07/27/2022    EGFRNONAA >60.0 07/27/2022       Lab Results   Component Value Date    ALT 10 03/07/2024    AST 49 (H) 03/07/2024    GGT 61 (H) 10/06/2023    ALKPHOS 77 03/07/2024    BILITOT 0.2 03/07/2024       Lab Results   Component Value Date    INR 1.1 12/13/2023    INR 1.2 11/28/2023    INR 1.1 11/20/2023       Significant Imaging:  Reviewed pertinent radiology findings.       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     AES consulted for pancreatic fluid collection     No evidence of gastric distention to indicate fluid collection is obstructing or any leukocytosis to indicate infection--no indication for cystgastrostomy at this time     Plan for EGD with general GI team tomorrow given  hematemesis and downtrend in Hgb (prior history of Corinne-Chauhan tear)    Problem List:  Pancreatitis with fluid collections     Recommendations:  No indication for cystgastrostomy at this time  Will arrange for repeat imaging in 6-8 weeks with follow up with Dr Villatoro as patient request to remain with [facility]   EGD tomorrow, npo at midnight tonighrt  US abdomen to evaluate for gallstones  Manage pancreatitis with IV fluids and pain management  Advance diet as tolerated     Thank you for involving us in the care of Tasia Cardona. Please call with any additional questions, concerns or changes in the patient's clinical status. We will continue to follow.     Eren Orellana MD  Gastroenterology Fellow PGY IV   [facility]

## 2024-03-07 NOTE — PLAN OF CARE
Problem: Adult Inpatient Plan of Care  Goal: Plan of Care Review  Outcome: Ongoing, Progressing  Goal: Patient-Specific Goal (Individualized)  Outcome: Ongoing, Progressing  Goal: Absence of Hospital-Acquired Illness or Injury  Outcome: Ongoing, Progressing  Goal: Optimal Comfort and Wellbeing  Outcome: Ongoing, Progressing  Goal: Readiness for Transition of Care  Outcome: Ongoing, Progressing       Problem: Fluid Imbalance (Pancreatitis)  Goal: Fluid Balance  Outcome: Ongoing, Progressing     Problem: Infection (Pancreatitis)  Goal: Infection Symptom Resolution  Outcome: Ongoing, Progressing     Problem: Nutrition Impaired (Pancreatitis)  Goal: Optimal Nutrition Intake  Outcome: Ongoing, Progressing     Problem: Pain (Pancreatitis)  Goal: Acceptable Pain Control  Outcome: Ongoing, Progressing     Problem: Respiratory Compromise (Pancreatitis)  Goal: Effective Oxygenation and Ventilation  Outcome: Ongoing, Progressing     Problem: Pain Acute  Goal: Acceptable Pain Control and Functional Ability  3/7/2024 1507 by Sia Carroll RN  Outcome: Ongoing, Progressing  3/7/2024 1506 by Sia Carroll RN  Outcome: Ongoing, Progressing    Patient AAOX4, makes needs known. Diet changed to regular texture this am per request, ate 100% on lunch, drinking Po fluids well. IV fluids also infusing. Pain/nausea control, po dilaudid as first method prn before IV. PRN xanax added for anxiety Plan for NPO at midnight, Abdominal US and EGD in am per GI. PRN miralax/senna added per patient request. Patient verbalized understanding. Bed low and locked, call light in reach.

## 2024-03-07 NOTE — PLAN OF CARE
Pt AAOx4, VS stable. Medications given as ordered. Pain medication given as requested. IVF cont. @ 125 mL/hr. CIWA 3. Pt with c/o anxiety. One time dose of ativan given. No other concerns this shift. Safety maintained.     Problem: Adult Inpatient Plan of Care  Goal: Plan of Care Review  Outcome: Ongoing, Progressing  Flowsheets (Taken 3/7/2024 0529)  Plan of Care Reviewed With: patient  Goal: Patient-Specific Goal (Individualized)  Outcome: Ongoing, Progressing  Goal: Absence of Hospital-Acquired Illness or Injury  Outcome: Ongoing, Progressing  Intervention: Prevent Skin Injury  Flowsheets (Taken 3/7/2024 0529)  Body Position: position changed independently  Skin Protection: adhesive use limited  Intervention: Prevent and Manage VTE (Venous Thromboembolism) Risk  Flowsheets (Taken 3/7/2024 0529)  Activity Management: Ambulated to bathroom - L4  VTE Prevention/Management:   bleeding precations maintained   bleeding risk assessed   bleeding risk factor(s) identified, provider notified   intravenous hydration  Range of Motion: active ROM (range of motion) encouraged  Intervention: Prevent Infection  Flowsheets (Taken 3/7/2024 0529)  Infection Prevention: hand hygiene promoted  Goal: Optimal Comfort and Wellbeing  Outcome: Ongoing, Progressing  Intervention: Monitor Pain and Promote Comfort  Flowsheets (Taken 3/7/2024 0529)  Pain Management Interventions:   around-the-clock dosing utilized   care clustered   pain management plan reviewed with patient/caregiver  Intervention: Provide Person-Centered Care  Flowsheets (Taken 3/7/2024 0529)  Trust Relationship/Rapport:   care explained   thoughts/feelings acknowledged  Goal: Readiness for Transition of Care  Outcome: Ongoing, Progressing     Problem: Pain Acute  Goal: Acceptable Pain Control and Functional Ability  Outcome: Ongoing, Progressing  Intervention: Develop Pain Management Plan  Flowsheets (Taken 3/7/2024 0529)  Pain Management Interventions:   around-the-clock  dosing utilized   care clustered   pain management plan reviewed with patient/caregiver  Intervention: Prevent or Manage Pain  Flowsheets (Taken 3/7/2024 0529)  Sleep/Rest Enhancement: regular sleep/rest pattern promoted  Bowel Elimination Promotion: adequate fluid intake promoted  Intervention: Optimize Psychosocial Wellbeing  Flowsheets (Taken 3/7/2024 0529)  Supportive Measures:   active listening utilized   verbalization of feelings encouraged

## 2024-03-07 NOTE — ANESTHESIA PREPROCEDURE EVALUATION
[facility]  Anesthesia Pre-Operative Evaluation         Patient Name: Tasia Cardona  YOB: 2000  MRN: [id number]    SUBJECTIVE:     Pre-operative evaluation for Procedure(s) (LRB):  EGD (ESOPHAGOGASTRODUODENOSCOPY) (N/A)     03/07/2024    Tasia Cardona is a 23 y.o. male w/ a significant PMHx of alcohol use disorder, HIV, chronic pancreatitis, asthma, splenic vein thrombosis, anemia, and polycythemia vera who presents to the ED with complaints of abdominal pain, nausea, and vomiting x3 days. Patient reports multiple episodes of bilious, bloody tinged emesis for the last 3 days     Patient now presents for the above procedure(s).    Echo Summary  Results for orders placed during the hospital encounter of 12/16/22    Echo    Interpretation Summary  · The left ventricle is normal in size with normal systolic function.  · The estimated ejection fraction is 55%.  · Normal left ventricular diastolic function.  · Normal right ventricular size with normal right ventricular systolic function.  · Intermediate central venous pressure (8 mmHg).       Prev airway:     Intubation     Date/Time: 9/18/2023 11:57 AM     Performed by: Lovely Veliz CRNA  Authorized by: Leighann Gallo MD    Intubation:     Induction:  Rapid sequence induction    Intubated:  Postinduction    Mask Ventilation:  Not attempted    Attempts:  1    Attempted By:  CRNA    Method of Intubation:  Video laryngoscopy    Blade:  Gomez 3    Laryngeal View Grade: Grade I - full view of cords      Difficult Airway Encountered?: No      Complications:  None    Airway Device:  Oral endotracheal tube    Airway Device Size:  7.5    Style/Cuff Inflation:  Cuffed (inflated to minimal occlusive pressure)    Inflation Amount (mL):  5    Tube secured:  21    Secured at:  The lips    Placement Verified By:  Capnometry    Complicating Factors:  None    Findings Post-Intubation:  BS equal bilateral and atraumatic/condition of teeth  unchanged       LDA:        Peripheral IV - Single Lumen 03/06/24 1335 20 G;1 3/4 in Anterior;Distal;Left Forearm (Active)   Site Assessment Intact;Dry;Clean 03/07/24 0820   Extremity Assessment Distal to IV No abnormal discoloration 03/07/24 0820   Line Status Flushed;Infusing 03/07/24 0820   Dressing Status Intact 03/07/24 0820   Dressing Intervention Integrity maintained 03/07/24 0820   Dressing Change Due 03/10/24 03/07/24 0820   Site Change Due 03/10/24 03/07/24 0820   Reason Not Rotated Not due 03/07/24 0820   Number of days: 0       Drips:    lactated ringers 125 mL/hr at 03/07/24 0511       Patient Active Problem List   Diagnosis    HIV infection    Polycythemia vera    Syncope    Nausea & vomiting    Hematemesis    Normocytic anemia    Mild intermittent asthma without complication    Chronic pancreatitis    Splenic vein thrombosis    Positive CMV IgG serology    Fluid collection of pancreas    Alcohol use disorder    Continuous severe abdominal pain    Hypokalemia    Pancreatic pseudocyst/cyst    Therapeutic opioid-induced constipation (OIC)    Necrotizing pancreatitis    Moderate malnutrition    Bilateral pleural effusion    Chronic pain syndrome    Melena    Acute on chronic pancreatitis    Pain management    Opiate dependence    COVID    Corinne-Chauhan tear    Peripancreatic fluid collection    Pancreatitis    High anion gap metabolic acidosis       Review of patient's allergies indicates:   Allergen Reactions    Dane Swelling    Pecan nut Swelling    Penicillins Hives     Tolerates cephalosporins    Tolerated piperacillin/tazobactam 11/2023    Soy Other (See Comments)    Sulfa (sulfonamide antibiotics) Hives       Current Inpatient Medications:   vusrzyqch-aohtylud-khtuorj ala  1 tablet Oral Daily    folic acid  1 mg Oral Daily    methocarbamoL  500 mg Oral QID    multivitamin  1 tablet Oral Daily    pantoprazole  40 mg Intravenous BID    pregabalin  75 mg Oral BID    senna-docusate 8.6-50 mg  2 tablet  Oral BID    sucralfate  1 g Oral Q6H    thiamine  100 mg Oral Daily       No current facility-administered medications on file prior to encounter.     Current Outpatient Medications on File Prior to Encounter   Medication Sig Dispense Refill    laisvcfrc-ifjevsyw-yodubej ala (BIKTARVY) -25 mg (25 kg or greater) Take 1 tablet by mouth once daily. 30 tablet 11    folic acid (FOLVITE) 1 MG tablet Take 1 tablet (1 mg total) by mouth once daily. 90 tablet 0    HYDROmorphone (DILAUDID) 8 MG tablet Take 6 mg by mouth every 4 (four) hours as needed for Pain.      omeprazole (PRILOSEC) 20 MG capsule Take 1 capsule (20 mg total) by mouth once daily. 30 capsule 11    oxyCODONE (ROXICODONE) 5 MG immediate release tablet Take 5 mg by mouth every 6 (six) hours as needed.      pantoprazole (PROTONIX) 40 MG tablet Take 1 tablet (40 mg total) by mouth 2 (two) times daily. 180 tablet 0    pregabalin (LYRICA) 150 MG capsule Take 1 capsule (150 mg total) by mouth 2 (two) times daily. for 14 days 28 capsule 0    promethazine (PHENERGAN) 25 MG suppository Place 1 suppository (25 mg total) rectally every 6 (six) hours as needed for Nausea. 12 suppository 0    promethazine (PHENERGAN) 25 MG suppository Place 1 suppository (25 mg total) rectally every 6 (six) hours as needed for Nausea. 10 suppository 0    senna (SENOKOT) 8.6 mg tablet Take 1 tablet by mouth every evening.      senna-docusate 8.6-50 mg (PERICOLACE) 8.6-50 mg per tablet Take 2 tablets by mouth 2 (two) times daily as needed for Constipation. 60 tablet 0       Past Surgical History:   Procedure Laterality Date    ENDOSCOPIC ULTRASOUND OF UPPER GASTROINTESTINAL TRACT N/A 10/23/2023    Procedure: ULTRASOUND, UPPER GI TRACT, ENDOSCOPIC;  Surgeon: Emil Villatoro MD;  Location: [address]);  Service: Endoscopy;  Laterality: N/A;    ERCP N/A 10/23/2023    Procedure: ERCP (ENDOSCOPIC RETROGRADE CHOLANGIOPANCREATOGRAPHY);  Surgeon: Emil Villatoro MD;  Location: [facility]  ENDO (2ND FLR);  Service: Endoscopy;  Laterality: N/A;    ESOPHAGOGASTRODUODENOSCOPY N/A 9/18/2023    Procedure: EGD (ESOPHAGOGASTRODUODENOSCOPY);  Surgeon: Kg Cleaning MD;  Location: [facility] (2ND FLR);  Service: Endoscopy;  Laterality: N/A;       Social History:  Tobacco Use: Medium Risk (3/4/2024)    Patient History     Smoking Tobacco Use: Former     Smokeless Tobacco Use: Never     Passive Exposure: Not on file      Alcohol Use: Not At Risk (3/6/2024)    AUDIT-C     Frequency of Alcohol Consumption: Never     Average Number of Drinks: Patient does not drink     Frequency of Binge Drinking: Never        OBJECTIVE:     Vital Signs Range (Last 24H):  Temp:  [36.7 °C (98 °F)-37.2 °C (98.9 °F)]   Pulse:  [65-96]   Resp:  [16-18]   BP: (129-141)/(80-95)   SpO2:  [96 %-100 %]       Significant Labs:  Lab Results   Component Value Date    WBC 5.16 03/07/2024    HGB 8.8 (L) 03/07/2024    HCT 30.9 (L) 03/07/2024     03/07/2024    CHOL 60 12/02/2023    TRIG 76 12/02/2023    HDL 11 (L) 12/02/2023    ALT 10 03/07/2024    AST 49 (H) 03/07/2024     03/07/2024    K 4.6 03/07/2024     03/07/2024    CREATININE 0.7 03/07/2024    BUN 4 (L) 03/07/2024    CO2 25 03/07/2024    TSH 1.858 12/17/2022    INR 1.1 12/13/2023    HGBA1C 5.1 10/06/2023       Diagnostic Studies: No relevant studies.    EKG:   Results for orders placed or performed during the hospital encounter of 03/04/24   EKG 12-lead    Collection Time: 03/04/24 11:21 AM   Result Value Ref Range    QRS Duration 74 ms    [facility] QTC Calculation 445 ms    Narrative    Test Reason : R55,    Vent. Rate : 115 BPM     Atrial Rate : 115 BPM     P-R Int : 128 ms          QRS Dur : 074 ms      QT Int : 322 ms       P-R-T Axes : 083 076 063 degrees     QTc Int : 445 ms    Sinus tachycardia  Otherwise normal ECG  When compared with ECG of 12-FEB-2024 10:10,  No significant change was found  Confirmed by Salbador DYER MD ([id number]) on 3/4/2024 3:10:54 PM    Referred By:              Confirmed By:Salbador DYER MD       2D ECHO:  TTE:  Results for orders placed or performed during the hospital encounter of 12/16/22   Echo   Result Value Ref Range    Ascending aorta 2.90 cm    STJ 2.63 cm    AV mean gradient 5 mmHg    Ao peak ronak 1.35 m/s    Ao VTI 22.29 cm    IVRT 117.03 msec    IVS 0.74 0.6 - 1.1 cm    LA size 3.34 cm    Left Atrium Major Axis 5.33 cm    Left Atrium Minor Axis 4.79 cm    LVIDd 5.59 3.5 - 6.0 cm    LVIDs 3.39 2.1 - 4.0 cm    LVOT diameter 2.59 cm    LVOT peak VTI 16.16 cm    Posterior Wall 0.54 (A) 0.6 - 1.1 cm    MV Peak A Ronak 0.69 m/s    E wave deceleration time 138.35 msec    MV Peak E Ronak 0.69 m/s    RA Major Axis 3.56 cm    RA Width 3.31 cm    RVDD 2.92 cm    Sinus 3.39 cm    TAPSE 2.57 cm    TDI LATERAL 0.17 m/s    TDI SEPTAL 0.13 m/s    LA WIDTH 3.54 cm    MV stenosis pressure 1/2 time 40.12 ms    LV Diastolic Volume 153.30 mL    LV Systolic Volume 46.94 mL    RV S' 20.33 cm/s    LVOT peak ronak 0.88 m/s    LA volume (mod) 42.77 cm3    LV LATERAL E/E' RATIO 4.06 m/s    LV SEPTAL E/E' RATIO 5.31 m/s    FS 39 %    LA volume 50.71 cm3    LV mass 125.04 g    Left Ventricle Relative Wall Thickness 0.19 cm    AV valve area 3.82 cm2    AV Velocity Ratio 0.65     AV index (prosthetic) 0.72     MV valve area p 1/2 method 5.48 cm2    E/A ratio 1.00     Mean e' 0.15 m/s    LVOT area 5.3 cm2    LVOT stroke volume 85.10 cm3    AV peak gradient 7 mmHg    E/E' ratio 4.60 m/s    LV Systolic Volume Index 26.2 mL/m2    LV Diastolic Volume Index 85.64 mL/m2    LA Volume Index 28.3 mL/m2    LV Mass Index 70 g/m2    LA Volume Index (Mod) 23.9 mL/m2    BSA 1.77 m2    Right Atrial Pressure (from IVC) 8 mmHg    EF 55 %    Narrative    · The left ventricle is normal in size with normal systolic function.  · The estimated ejection fraction is 55%.  · Normal left ventricular diastolic function.  · Normal right ventricular size with normal right ventricular systolic   function.  · Intermediate  central venous pressure (8 mmHg).          RAMSES:  No results found for this or any previous visit.    ASSESSMENT/PLAN:           Pre-op Assessment    I have reviewed the Patient Summary Reports.     I have reviewed the Nursing Notes.    I have reviewed the Medications.     Review of Systems  Anesthesia Hx:  No problems with previous Anesthesia   History of prior surgery of interest to airway management or planning:  Previous anesthesia: General           Social:  Smoker, Social Alcohol Use Alcohol use disorder      Hematology/Oncology:    Oncology Normal    -- Anemia:               Hematology Comments: Polycythemia vera, HIV infection, on  eliquis for splenic vein thrombosis                    Cardiovascular:  Cardiovascular Normal Exercise tolerance: good    Denies Hypertension.   Denies MI.        Denies Angina.                                  Pulmonary:    Denies COPD. Asthma mild  Denies Shortness of breath.                  Renal/:  Renal/ Normal  Denies Chronic Renal Disease.                Hepatic/GI:        Denies Hepatitis. Pancreatic abscess  Chronic pancreatitis  Corinne Chauhan tear          Musculoskeletal:  Musculoskeletal Normal                Neurological:  Neurology Normal Denies TIA.  Denies CVA.    Denies Seizures.                                Endocrine:  Endocrine Normal Denies Diabetes.           Dermatological:  Skin Normal    Psych:  Psychiatric Normal                    Physical Exam  General: Well nourished, Cooperative, Alert and Oriented    Airway:  Mallampati: II   Mouth Opening: Normal  TM Distance: Normal  Tongue: Normal  Neck ROM: Normal ROM    Dental:  Dentures        Anesthesia Plan  Type of Anesthesia, risks & benefits discussed:    Anesthesia Type: Gen Natural Airway, Gen Supraglottic Airway, Gen ETT, MAC  Intra-op Monitoring Plan: Standard ASA Monitors  Post Op Pain Control Plan: multimodal analgesia and IV/PO Opioids PRN  Induction:  IV  Airway Plan: , Post-Induction  ASA  Score: 3  Day of Surgery Review of History & Physical: H&P Update referred to the surgeon/provider.    Ready For Surgery From Anesthesia Perspective.     .

## 2024-03-08 ENCOUNTER — ANESTHESIA (OUTPATIENT)
Dept: ENDOSCOPY | Facility: HOSPITAL | Age: 24
DRG: 439 | End: 2024-03-08
Payer: MEDICAID

## 2024-03-08 LAB
CLINICAL BIOCHEMIST REVIEW: NORMAL
PLPETH BLD-MCNC: 1132 NG/ML
POPETH BLD-MCNC: 1328 NG/ML

## 2024-03-08 PROCEDURE — 63600175 PHARM REV CODE 636 W HCPCS: Performed by: REGISTERED NURSE

## 2024-03-08 PROCEDURE — 63600175 PHARM REV CODE 636 W HCPCS: Performed by: HOSPITALIST

## 2024-03-08 PROCEDURE — 27000221 HC OXYGEN, UP TO 24 HOURS

## 2024-03-08 PROCEDURE — D9220A PRA ANESTHESIA: Mod: CRNA,,, | Performed by: REGISTERED NURSE

## 2024-03-08 PROCEDURE — 25000003 PHARM REV CODE 250: Performed by: HOSPITALIST

## 2024-03-08 PROCEDURE — 63600175 PHARM REV CODE 636 W HCPCS: Performed by: NURSE PRACTITIONER

## 2024-03-08 PROCEDURE — 25000003 PHARM REV CODE 250: Performed by: NURSE PRACTITIONER

## 2024-03-08 PROCEDURE — 25000003 PHARM REV CODE 250

## 2024-03-08 PROCEDURE — 43235 EGD DIAGNOSTIC BRUSH WASH: CPT | Performed by: STUDENT IN AN ORGANIZED HEALTH CARE EDUCATION/TRAINING PROGRAM

## 2024-03-08 PROCEDURE — 37000008 HC ANESTHESIA 1ST 15 MINUTES: Performed by: STUDENT IN AN ORGANIZED HEALTH CARE EDUCATION/TRAINING PROGRAM

## 2024-03-08 PROCEDURE — 94761 N-INVAS EAR/PLS OXIMETRY MLT: CPT

## 2024-03-08 PROCEDURE — 37000009 HC ANESTHESIA EA ADD 15 MINS: Performed by: STUDENT IN AN ORGANIZED HEALTH CARE EDUCATION/TRAINING PROGRAM

## 2024-03-08 PROCEDURE — 11000001 HC ACUTE MED/SURG PRIVATE ROOM

## 2024-03-08 PROCEDURE — D9220A PRA ANESTHESIA: Mod: ANES,,, | Performed by: ANESTHESIOLOGY

## 2024-03-08 PROCEDURE — 25000003 PHARM REV CODE 250: Performed by: REGISTERED NURSE

## 2024-03-08 RX ORDER — PANTOPRAZOLE SODIUM 40 MG/1
40 TABLET, DELAYED RELEASE ORAL 2 TIMES DAILY
Status: DISCONTINUED | OUTPATIENT
Start: 2024-03-08 | End: 2024-03-10 | Stop reason: HOSPADM

## 2024-03-08 RX ORDER — PROPOFOL 10 MG/ML
VIAL (ML) INTRAVENOUS
Status: DISCONTINUED | OUTPATIENT
Start: 2024-03-08 | End: 2024-03-08

## 2024-03-08 RX ORDER — PROCHLORPERAZINE EDISYLATE 5 MG/ML
5 INJECTION INTRAMUSCULAR; INTRAVENOUS EVERY 30 MIN PRN
Status: DISCONTINUED | OUTPATIENT
Start: 2024-03-08 | End: 2024-03-08 | Stop reason: HOSPADM

## 2024-03-08 RX ORDER — FENTANYL CITRATE 50 UG/ML
25 INJECTION, SOLUTION INTRAMUSCULAR; INTRAVENOUS EVERY 5 MIN PRN
Status: DISCONTINUED | OUTPATIENT
Start: 2024-03-08 | End: 2024-03-08 | Stop reason: HOSPADM

## 2024-03-08 RX ORDER — FENTANYL CITRATE 50 UG/ML
INJECTION, SOLUTION INTRAMUSCULAR; INTRAVENOUS
Status: DISCONTINUED | OUTPATIENT
Start: 2024-03-08 | End: 2024-03-08

## 2024-03-08 RX ORDER — PROPOFOL 10 MG/ML
VIAL (ML) INTRAVENOUS CONTINUOUS PRN
Status: DISCONTINUED | OUTPATIENT
Start: 2024-03-08 | End: 2024-03-08

## 2024-03-08 RX ORDER — LIDOCAINE HYDROCHLORIDE 20 MG/ML
INJECTION INTRAVENOUS
Status: DISCONTINUED | OUTPATIENT
Start: 2024-03-08 | End: 2024-03-08

## 2024-03-08 RX ORDER — SODIUM CHLORIDE 0.9 % (FLUSH) 0.9 %
3 SYRINGE (ML) INJECTION
Status: DISCONTINUED | OUTPATIENT
Start: 2024-03-08 | End: 2024-03-08 | Stop reason: HOSPADM

## 2024-03-08 RX ORDER — KETOCONAZOLE 20 MG/ML
SHAMPOO, SUSPENSION TOPICAL DAILY
Status: DISCONTINUED | OUTPATIENT
Start: 2024-03-08 | End: 2024-03-10 | Stop reason: HOSPADM

## 2024-03-08 RX ORDER — MIDAZOLAM HYDROCHLORIDE 1 MG/ML
INJECTION, SOLUTION INTRAMUSCULAR; INTRAVENOUS
Status: DISCONTINUED | OUTPATIENT
Start: 2024-03-08 | End: 2024-03-08

## 2024-03-08 RX ADMIN — SODIUM CHLORIDE, POTASSIUM CHLORIDE, SODIUM LACTATE AND CALCIUM CHLORIDE: 600; 310; 30; 20 INJECTION, SOLUTION INTRAVENOUS at 02:03

## 2024-03-08 RX ADMIN — PANTOPRAZOLE SODIUM 40 MG: 40 TABLET, DELAYED RELEASE ORAL at 08:03

## 2024-03-08 RX ADMIN — METHOCARBAMOL 500 MG: 500 TABLET ORAL at 05:03

## 2024-03-08 RX ADMIN — SUCRALFATE 1 G: 1 SUSPENSION ORAL at 06:03

## 2024-03-08 RX ADMIN — HYDROMORPHONE HYDROCHLORIDE 1.5 MG: 1 INJECTION, SOLUTION INTRAMUSCULAR; INTRAVENOUS; SUBCUTANEOUS at 03:03

## 2024-03-08 RX ADMIN — SENNOSIDES AND DOCUSATE SODIUM 2 TABLET: 8.6; 5 TABLET ORAL at 10:03

## 2024-03-08 RX ADMIN — ALPRAZOLAM 0.5 MG: 0.5 TABLET ORAL at 04:03

## 2024-03-08 RX ADMIN — PROPOFOL 250 MCG/KG/MIN: 10 INJECTION, EMULSION INTRAVENOUS at 09:03

## 2024-03-08 RX ADMIN — SUCRALFATE 1 G: 1 SUSPENSION ORAL at 05:03

## 2024-03-08 RX ADMIN — HYDROMORPHONE HYDROCHLORIDE 1.5 MG: 1 INJECTION, SOLUTION INTRAMUSCULAR; INTRAVENOUS; SUBCUTANEOUS at 10:03

## 2024-03-08 RX ADMIN — HYDROMORPHONE HYDROCHLORIDE 1.5 MG: 1 INJECTION, SOLUTION INTRAMUSCULAR; INTRAVENOUS; SUBCUTANEOUS at 06:03

## 2024-03-08 RX ADMIN — GLYCOPYRROLATE 0.2 MG: 0.2 INJECTION, SOLUTION INTRAMUSCULAR; INTRAVENOUS at 09:03

## 2024-03-08 RX ADMIN — HYDROMORPHONE HYDROCHLORIDE 2 MG: 2 TABLET ORAL at 05:03

## 2024-03-08 RX ADMIN — MIDAZOLAM HYDROCHLORIDE 2 MG: 1 INJECTION, SOLUTION INTRAMUSCULAR; INTRAVENOUS at 08:03

## 2024-03-08 RX ADMIN — SODIUM CHLORIDE: 9 INJECTION, SOLUTION INTRAVENOUS at 08:03

## 2024-03-08 RX ADMIN — METHOCARBAMOL 500 MG: 500 TABLET ORAL at 08:03

## 2024-03-08 RX ADMIN — ALPRAZOLAM 0.5 MG: 0.5 TABLET ORAL at 06:03

## 2024-03-08 RX ADMIN — HYDROMORPHONE HYDROCHLORIDE 1.5 MG: 1 INJECTION, SOLUTION INTRAMUSCULAR; INTRAVENOUS; SUBCUTANEOUS at 08:03

## 2024-03-08 RX ADMIN — THERA TABS 1 TABLET: TAB at 10:03

## 2024-03-08 RX ADMIN — PANTOPRAZOLE SODIUM 40 MG: 40 TABLET, DELAYED RELEASE ORAL at 10:03

## 2024-03-08 RX ADMIN — HYDROMORPHONE HYDROCHLORIDE 1.5 MG: 1 INJECTION, SOLUTION INTRAMUSCULAR; INTRAVENOUS; SUBCUTANEOUS at 02:03

## 2024-03-08 RX ADMIN — FENTANYL CITRATE 25 MCG: 50 INJECTION, SOLUTION INTRAMUSCULAR; INTRAVENOUS at 09:03

## 2024-03-08 RX ADMIN — METHOCARBAMOL 500 MG: 500 TABLET ORAL at 10:03

## 2024-03-08 RX ADMIN — SENNOSIDES AND DOCUSATE SODIUM 2 TABLET: 8.6; 5 TABLET ORAL at 08:03

## 2024-03-08 RX ADMIN — PREGABALIN 75 MG: 75 CAPSULE ORAL at 10:03

## 2024-03-08 RX ADMIN — SUCRALFATE 1 G: 1 SUSPENSION ORAL at 12:03

## 2024-03-08 RX ADMIN — Medication 100 MG: at 10:03

## 2024-03-08 RX ADMIN — BENZOCAINE 1 CAN: 200 SPRAY DENTAL; ORAL; PERIODONTAL at 08:03

## 2024-03-08 RX ADMIN — PROPOFOL 100 MG: 10 INJECTION, EMULSION INTRAVENOUS at 09:03

## 2024-03-08 RX ADMIN — PREGABALIN 75 MG: 75 CAPSULE ORAL at 08:03

## 2024-03-08 RX ADMIN — Medication 6 MG: at 09:03

## 2024-03-08 RX ADMIN — BICTEGRAVIR SODIUM, EMTRICITABINE, AND TENOFOVIR ALAFENAMIDE FUMARATE 1 TABLET: 50; 200; 25 TABLET ORAL at 10:03

## 2024-03-08 RX ADMIN — FOLIC ACID 1 MG: 1 TABLET ORAL at 10:03

## 2024-03-08 RX ADMIN — LIDOCAINE HYDROCHLORIDE 100 MG: 20 INJECTION INTRAVENOUS at 09:03

## 2024-03-08 RX ADMIN — FENTANYL CITRATE 50 MCG: 50 INJECTION, SOLUTION INTRAMUSCULAR; INTRAVENOUS at 08:03

## 2024-03-08 RX ADMIN — KETOCONAZOLE: 20 SHAMPOO, SUSPENSION TOPICAL at 12:03

## 2024-03-08 RX ADMIN — METHOCARBAMOL 500 MG: 500 TABLET ORAL at 02:03

## 2024-03-08 RX ADMIN — HYDROMORPHONE HYDROCHLORIDE 2 MG: 2 TABLET ORAL at 04:03

## 2024-03-08 NOTE — PROVATION PATIENT INSTRUCTIONS
Discharge Summary/Instructions after an Endoscopic Procedure  Patient Name: Tasia Cardona  Patient MRN: [id number]  Patient YOB: 2000 Friday, March 8, 2024  Greg Love MD  Dear patient,  As a result of recent federal legislation (The Federal Cures Act), you may   receive lab or pathology results from your procedure in your [facility]   account before your physician is able to contact you. Your physician or   their representative will relay the results to you with their   recommendations at their soonest availability.  Thank you,  RESTRICTIONS:  During your procedure today, you received medications for sedation.  These   medications may affect your judgment, balance and coordination.  Therefore,   for 24 hours, you have the following restrictions:   - DO NOT drive a car, operate machinery, make legal/financial decisions,   sign important papers or drink alcohol.    ACTIVITY:  Today: no heavy lifting, straining or running due to procedural   sedation/anesthesia.  The following day: return to full activity including work.  DIET:  Eat and drink normally unless instructed otherwise.     TREATMENT FOR COMMON SIDE EFFECTS:  - Mild abdominal pain, nausea, belching, bloating or excessive gas:  rest,   eat lightly and use a heating pad.  - Sore Throat: treat with throat lozenges and/or gargle with warm salt   water.  - Because air was used during the procedure, expelling large amounts of air   from your rectum or belching is normal.  - If a bowel prep was taken, you may not have a bowel movement for 1-3 days.    This is normal.  SYMPTOMS TO WATCH FOR AND REPORT TO YOUR PHYSICIAN:  1. Abdominal pain or bloating, other than gas cramps.  2. Chest pain.  3. Back pain.  4. Signs of infection such as: chills or fever occurring within 24 hours   after the procedure.  5. Rectal bleeding, which would show as bright red, maroon, or black stools.   (A tablespoon of blood from the rectum is not serious, especially  if   hemorrhoids are present.)  6. Vomiting.  7. Weakness or dizziness.  GO DIRECTLY TO THE NEAREST EMERGENCY ROOM IF YOU HAVE ANY OF THE FOLLOWING:      Difficulty breathing              Chills and/or fever over 101 F   Persistent vomiting and/or vomiting blood   Severe abdominal pain   Severe chest pain   Black, tarry stools   Bleeding- more than one tablespoon   Any other symptom or condition that you feel may need urgent attention  Your doctor recommends these additional instructions:  If any biopsies were taken, your doctors clinic will contact you in 1 to 2   weeks with any results.  - [facility] patient to [facility] for ongoing care.   - Advance diet as tolerated.   - Use Protonix (pantoprazole) 40 mg PO BID for 12 weeks.   - Perform an H. pylori serology today.   - Repeat upper endoscopy in 12 weeks to check healing.   - Await recommendations from [facility] team.  For questions, problems or results please call your physician - Greg Love MD at Work:  [phone].  [facility], EMERGENCY ROOM PHONE NUMBER: [phone]  IF A COMPLICATION OR EMERGENCY SITUATION ARISES AND YOU ARE UNABLE TO REACH   YOUR PHYSICIAN - GO DIRECTLY TO THE EMERGENCY ROOM.  Greg Love MD  3/8/2024 9:22:40 AM  This report has been verified and signed electronically.  Dear patient,  As a result of recent federal legislation (The Federal Cures Act), you may   receive lab or pathology results from your procedure in your [facility]   account before your physician is able to contact you. Your physician or   their representative will relay the results to you with their   recommendations at their soonest availability.  Thank you,  PROVATION

## 2024-03-08 NOTE — ANESTHESIA POSTPROCEDURE EVALUATION
----- Message from Edmund Bowling MD sent at 12/6/2019  5:08 AM CST -----  Please call the patient regarding her abnormal result. Her urine culture did grow out a bacteria. Her urine sample looked okay. Is she having any problems with frequency, urgency or burning. If yes, I will call in some antibiotics.   Anesthesia Post Evaluation    Patient: Tasia Cardona    Procedure(s) Performed: Procedure(s) (LRB):  EGD (ESOPHAGOGASTRODUODENOSCOPY) (N/A)    Final Anesthesia Type: general      Patient location during evaluation: PACU  Patient participation: Yes- Able to Participate  Level of consciousness: awake and alert  Post-procedure vital signs: reviewed and stable  Pain management: adequate  Airway patency: patent    PONV status at discharge: No PONV  Anesthetic complications: no      Cardiovascular status: blood pressure returned to baseline  Respiratory status: unassisted  Hydration status: euvolemic  Follow-up not needed.              Vitals Value Taken Time   /71 03/08/24 0934   Temp 36.6 °C (97.9 °F) 03/08/24 0920   Pulse 87 03/08/24 0945   Resp 17 03/08/24 1008   SpO2 96 % 03/08/24 0945         Event Time   Out of Recovery 03/08/2024 09:45:00         Pain/Mayo Score: Pain Rating Prior to Med Admin: 8 (3/8/2024 10:08 AM)  Pain Rating Post Med Admin: 8 (3/7/2024  4:57 PM)  Mayo Score: 10 (3/8/2024  9:45 AM)

## 2024-03-08 NOTE — PLAN OF CARE
Pt AAO x 4, VS stable. Medications given as ordered. Pain medication given as requested. IVF cont. @ 125 mL/hr. NPO @ midnight. No significant events to report during this shift. Pt with no complaints other than pain. Safety maintained.     Problem: Adult Inpatient Plan of Care  Goal: Plan of Care Review  Outcome: Ongoing, Progressing  Flowsheets (Taken 3/8/2024 0449)  Plan of Care Reviewed With: patient  Goal: Patient-Specific Goal (Individualized)  Outcome: Ongoing, Progressing  Goal: Absence of Hospital-Acquired Illness or Injury  Outcome: Ongoing, Progressing  Intervention: Prevent Skin Injury  Flowsheets (Taken 3/8/2024 0449)  Skin Protection: adhesive use limited  Intervention: Prevent and Manage VTE (Venous Thromboembolism) Risk  Flowsheets (Taken 3/8/2024 0449)  Activity Management: Ambulated to bathroom - L4  VTE Prevention/Management:   bleeding precations maintained   bleeding risk assessed  Range of Motion: active ROM (range of motion) encouraged  Intervention: Prevent Infection  Flowsheets (Taken 3/8/2024 0449)  Infection Prevention: hand hygiene promoted  Goal: Optimal Comfort and Wellbeing  Outcome: Ongoing, Progressing  Intervention: Monitor Pain and Promote Comfort  Flowsheets (Taken 3/8/2024 0449)  Pain Management Interventions:   around-the-clock dosing utilized   care clustered   pain management plan reviewed with patient/caregiver   position adjusted  Intervention: Provide Person-Centered Care  Flowsheets (Taken 3/8/2024 0449)  Trust Relationship/Rapport:   care explained   emotional support provided   thoughts/feelings acknowledged  Goal: Readiness for Transition of Care  Outcome: Ongoing, Progressing     Problem: Pain Acute  Goal: Acceptable Pain Control and Functional Ability  Outcome: Ongoing, Progressing  Intervention: Develop Pain Management Plan  Flowsheets (Taken 3/8/2024 0449)  Pain Management Interventions:   around-the-clock dosing utilized   care clustered   pain management plan  reviewed with patient/caregiver   position adjusted  Intervention: Prevent or Manage Pain  Flowsheets (Taken 3/8/2024 0449)  Sleep/Rest Enhancement:   awakenings minimized   regular sleep/rest pattern promoted  Intervention: Optimize Psychosocial Wellbeing  Flowsheets (Taken 3/8/2024 0449)  Supportive Measures:   active listening utilized   verbalization of feelings encouraged  Diversional Activities:   television   smartphone     Problem: Fluid Imbalance (Pancreatitis)  Goal: Fluid Balance  Outcome: Ongoing, Progressing     Problem: Infection (Pancreatitis)  Goal: Infection Symptom Resolution  Outcome: Ongoing, Progressing     Problem: Nutrition Impaired (Pancreatitis)  Goal: Optimal Nutrition Intake  Outcome: Ongoing, Progressing     Problem: Pain (Pancreatitis)  Goal: Acceptable Pain Control  Outcome: Ongoing, Progressing  Intervention: Monitor and Manage Pain  Flowsheets (Taken 3/8/2024 0449)  Sleep/Rest Enhancement:   awakenings minimized   regular sleep/rest pattern promoted  Diversional Activities:   television   smartphone  Pain Management Interventions:   around-the-clock dosing utilized   care clustered   pain management plan reviewed with patient/caregiver   position adjusted     Problem: Respiratory Compromise (Pancreatitis)  Goal: Effective Oxygenation and Ventilation  Outcome: Ongoing, Progressing  Intervention: Optimize Oxygenation and Ventilation  Flowsheets (Taken 3/8/2024 0449)  Activity Management: Ambulated to bathroom - L4

## 2024-03-08 NOTE — TREATMENT PLAN
Brief GI Treatment Plan    Findings on EGD today:  - LA Grade D reflux esophagitis with no bleeding.    - A medium amount of food (residue) in the stomach.   - Erosive gastropathy with no bleeding and no stigmata of recent bleeding.   - Erythematous duodenopathy.   - Acquired duodenal stenosis.   - No specimens collected.     PLAN:  - Return patient to [facility] for ongoing care.   - Advance diet as tolerated.   - Use Protonix (pantoprazole) 40 mg PO BID for 12 weeks.   - Perform an H. pylori serology today.   - Repeat upper endoscopy in 12 weeks to check healing. Have ordered with gastroparesis protocol.   - Await recommendations from [facility] team.     We will sign off.     Tony Matos MD  PGY-V, Gastroenterology & Hepatology

## 2024-03-08 NOTE — H&P
Short Stay Endoscopy History and Physical    PCP - Pina Jones NP     Procedure - EGD  ASA - per anesthesia  Mallampati - per anesthesia  History of Anesthesia problems - no  Family history Anesthesia problems -  no   Plan of anesthesia - General    HPI:  This is a 23 y.o. male here for evaluation of :     Hematemesis; prior history of Corinne-Chauhan tear.  Hb this AM 8.8. CT showing lower esophageal wall thickening. Patient has chronic splenic vein thrombosis.       ROS:  Constitutional: No fevers, chills, No weight loss  CV: No chest pain  Pulm: No cough, No shortness of breath  Ophtho: No vision changes  GI: see HPI  Derm: No rash    Medical History:  has a past medical history of Acute necrotizing pancreatitis (09/20/2023), Alcohol use disorder (10/05/2023), Asthma, Hepatitis C antibody positive in blood (09/18/2023), HIV infection (10/2021), Corinne-Chauhan tear (09/18/2023), Normocytic anemia (09/18/2023), Pancreatic pseudocyst/cyst (10/24/2023), Polycythemia vera (11/28/2021), Positive CMV IgG serology (09/21/2023), and Splenic vein thrombosis (09/20/2023).    Surgical History:  has a past surgical history that includes Esophagogastroduodenoscopy (N/A, 9/18/2023); Endoscopic ultrasound of upper gastrointestinal tract (N/A, 10/23/2023); and ERCP (N/A, 10/23/2023).    Family History: family history includes Breast cancer in his maternal grandmother; Cancer in his brother and mother; No Known Problems in his father; Ovarian cancer in his mother; Pancreatitis in his mother.. Otherwise no colon cancer, inflammatory bowel disease, or GI malignancies.    Social History:  reports that he has quit smoking. His smoking use included cigarettes. He has never used smokeless tobacco. He reports that he does not currently use alcohol. He reports that he does not use drugs.    Review of patient's allergies indicates:   Allergen Reactions    [place] Swelling    Pecan nut Swelling    Penicillins Hives     Tolerates  cephalosporins    Tolerated piperacillin/tazobactam 11/2023    Soy Other (See Comments)    Sulfa (sulfonamide antibiotics) Hives       Medications:   Medications Prior to Admission   Medication Sig Dispense Refill Last Dose    bhezxgivy-hsybxgbq-wpyawro ala (BIKTARVY) -25 mg (25 kg or greater) Take 1 tablet by mouth once daily. 30 tablet 11     folic acid (FOLVITE) 1 MG tablet Take 1 tablet (1 mg total) by mouth once daily. 90 tablet 0     HYDROmorphone (DILAUDID) 8 MG tablet Take 6 mg by mouth every 4 (four) hours as needed for Pain.       omeprazole (PRILOSEC) 20 MG capsule Take 1 capsule (20 mg total) by mouth once daily. 30 capsule 11     oxyCODONE (ROXICODONE) 5 MG immediate release tablet Take 5 mg by mouth every 6 (six) hours as needed.       pantoprazole (PROTONIX) 40 MG tablet Take 1 tablet (40 mg total) by mouth 2 (two) times daily. 180 tablet 0     pregabalin (LYRICA) 150 MG capsule Take 1 capsule (150 mg total) by mouth 2 (two) times daily. for 14 days 28 capsule 0     promethazine (PHENERGAN) 25 MG suppository Place 1 suppository (25 mg total) rectally every 6 (six) hours as needed for Nausea. 12 suppository 0     promethazine (PHENERGAN) 25 MG suppository Place 1 suppository (25 mg total) rectally every 6 (six) hours as needed for Nausea. 10 suppository 0     senna (SENOKOT) 8.6 mg tablet Take 1 tablet by mouth every evening.       senna-docusate 8.6-50 mg (PERICOLACE) 8.6-50 mg per tablet Take 2 tablets by mouth 2 (two) times daily as needed for Constipation. 60 tablet 0        Physical Exam:    Vital Signs:   Vitals:    03/08/24 0604   BP:    Pulse:    Resp: 17   Temp:        General Appearance: Well appearing in no acute distress  Eyes:    No scleral icterus  ENT: Neck supple, Lips, mucosa, and tongue normal; teeth and gums normal  Abdomen: Soft, non tender, non distended with normal bowel sounds. No hepatosplenomegaly, ascites, or mass.  Extremities: No edema  Skin: No rash    Labs:  Lab  Results   Component Value Date    WBC 5.16 03/07/2024    HGB 8.8 (L) 03/07/2024    HCT 30.9 (L) 03/07/2024     03/07/2024    CHOL 60 12/02/2023    TRIG 76 12/02/2023    HDL 11 (L) 12/02/2023    ALT 10 03/07/2024    AST 49 (H) 03/07/2024     03/07/2024    K 4.6 03/07/2024     03/07/2024    CREATININE 0.7 03/07/2024    BUN 4 (L) 03/07/2024    CO2 25 03/07/2024    TSH 1.858 12/17/2022    INR 1.1 12/13/2023    HGBA1C 5.1 10/06/2023       I have explained the risks and benefits of endoscopy procedures to the patient including but not limited to bleeding, perforation, infection, and death.  The patient was asked if they understand and allowed to ask any further questions to their satisfaction.      Tony Matos MD

## 2024-03-08 NOTE — TRANSFER OF CARE
"Anesthesia Transfer of Care Note    Patient: Tasia Cardona    Procedure(s) Performed: Procedure(s) (LRB):  EGD (ESOPHAGOGASTRODUODENOSCOPY) (N/A)    Patient location: PACU    Anesthesia Type: general    Transport from OR: Transported from OR on 2-3 L/min O2 by NC with adequate spontaneous ventilation    Post pain: adequate analgesia    Post assessment: no apparent anesthetic complications    Post vital signs: stable    Level of consciousness: sedated    Nausea/Vomiting: no nausea/vomiting    Complications: none    Transfer of care protocol was followed    Last vitals: Visit Vitals  /76 (BP Location: Left arm, Patient Position: Lying)   Pulse 95   Temp 36.8 °C (98.3 °F) (Oral)   Resp 17   Ht 5' 10" (1.778 m)   Wt 63.5 kg (140 lb)   SpO2 96%   BMI 20.09 kg/m²     "

## 2024-03-08 NOTE — SUBJECTIVE & OBJECTIVE
Interval History:   3/8: EGD with esophagitis, returned to room, no new issues reported.     Review of Systems   Constitutional:  Positive for fatigue. Negative for chills, diaphoresis and fever.   HENT:  Negative for congestion, postnasal drip and rhinorrhea.    Eyes:  Negative for photophobia.   Respiratory:  Negative for cough, shortness of breath and wheezing.    Cardiovascular:  Negative for palpitations and leg swelling. Chest pain: burning/cramping.  Gastrointestinal:  Negative for abdominal distention, blood in stool and diarrhea.   Genitourinary:  Negative for dysuria, frequency and hematuria.   Musculoskeletal:  Negative for back pain, gait problem and myalgias.   Skin:  Positive for wound. Negative for pallor and rash.   Neurological:  Negative for dizziness, facial asymmetry, speech difficulty, numbness and headaches. Weakness: generalized.  Psychiatric/Behavioral:  Negative for confusion and hallucinations. The patient is not nervous/anxious.      Objective:     Vital Signs (Most Recent):  Temp: 97.6 °F (36.4 °C) (03/08/24 1109)  Pulse: 75 (03/08/24 1109)  Resp: 18 (03/08/24 1109)  BP: 118/76 (03/08/24 1109)  SpO2: 96 % (03/08/24 1109) Vital Signs (24h Range):  Temp:  [97.6 °F (36.4 °C)-98.4 °F (36.9 °C)] 97.6 °F (36.4 °C)  Pulse:  [75-97] 75  Resp:  [10-18] 18  SpO2:  [95 %-100 %] 96 %  BP: (112-127)/(61-83) 118/76     Weight: 63.5 kg (140 lb)  Body mass index is 20.09 kg/m².    Intake/Output Summary (Last 24 hours) at 3/8/2024 1242  Last data filed at 3/8/2024 0916  Gross per 24 hour   Intake 2029.71 ml   Output --   Net 2029.71 ml      Physical Exam  Vitals and nursing note reviewed.   Constitutional:       General: He is not in acute distress.     Appearance: He is not toxic-appearing or diaphoretic.   HENT:      Head: Normocephalic and atraumatic.      Nose: Nose normal.      Mouth/Throat:      Mouth: Mucous membranes are moist.   Eyes:      Pupils: Pupils are equal, round, and reactive to light.    Cardiovascular:      Rate and Rhythm: Normal rate and regular rhythm.      Pulses: Normal pulses.   Pulmonary:      Effort: Pulmonary effort is normal. No respiratory distress.      Breath sounds: No wheezing, rhonchi or rales.   Abdominal:      General: Bowel sounds are normal. There is no distension.      Palpations: Abdomen is soft.      Tenderness: There is abdominal tenderness in the epigastric area, left upper quadrant and left lower quadrant. There is no guarding.   Musculoskeletal:         General: No swelling, tenderness or deformity. Normal range of motion.      Right elbow: Normal range of motion.      Cervical back: Normal range of motion.      Right knee: Normal range of motion.      Left knee: Normal range of motion.      Comments: Bruising and abrasions to misti knees and R elbow   Skin:     General: Skin is warm and dry.      Capillary Refill: Capillary refill takes less than 2 seconds.   Neurological:      General: No focal deficit present.      Mental Status: He is alert and oriented to person, place, and time.      Sensory: No sensory deficit.      Motor: No weakness.   Psychiatric:         Mood and Affect: Mood normal.         Behavior: Behavior normal.         MELD 3.0: 8 at 12/15/2023  3:50 AM  MELD-Na: 7 at 12/15/2023  3:50 AM  Calculated from:  Serum Creatinine: 0.5 mg/dL (Using min of 1 mg/dL) at 12/15/2023  3:50 AM  Serum Sodium: 138 mmol/L (Using max of 137 mmol/L) at 12/15/2023  3:50 AM  Total Bilirubin: 0.2 mg/dL (Using min of 1 mg/dL) at 12/15/2023  3:50 AM  Serum Albumin: 2.7 g/dL at 12/15/2023  3:50 AM  INR(ratio): 1.1 at 12/13/2023  8:30 AM  Age at listing (hypothetical): 23 years  Sex: Male at 12/15/2023  3:50 AM      Significant Labs:  CBC:  Recent Labs   Lab 03/07/24 0248   WBC 5.16   HGB 8.8*   HCT 30.9*        CMP:  Recent Labs   Lab 03/07/24  0248      K 4.6      CO2 25      BUN 4*   CREATININE 0.7   CALCIUM 8.7   PROT 6.0   ALBUMIN 2.8*   BILITOT 0.2    ALKPHOS 77   AST 49*   ALT 10   ANIONGAP 9

## 2024-03-08 NOTE — ASSESSMENT & PLAN NOTE
Patient with history of known MW tear noted on previous EGD 9/2023. Patient says he has a small amount of blood in his vomit. He was told he didn't need to take protonix, so he has not taken it in a few months. Will restart PPI and give a dose of carafate for burning pain. Patient reports he has not vomited in several hours and is tolerating sips of water currently.     - restart protonix BID  - EGD completed on 3/8 with esophagitis

## 2024-03-08 NOTE — PROGRESS NOTES
[facility] - Med Surg ([address] Medicine  Progress Note    Patient Name: Tasia Cardona  MRN: [id number]  Patient Class: IP- Inpatient   Admission Date: 3/4/2024  Length of Stay: 4 days  Attending Physician: Brigida Barrow MD  Primary Care Provider: Pina Jones NP        Subjective:     Principal Problem:Acute on chronic pancreatitis        HPI:  Tasia Cardona is a 23 y.o. male with a PMHx of alcohol use disorder, HIV, chronic pancreatitis, asthma, splenic vein thrombosis, anemia, and polycythemia vera who presents to the ED with complaints of abdominal pain, nausea, and vomiting x3 days. States that this feels similar to his pancreatitis flares. Patient reports multiple episodes of bilious, bloody tinged emesis for the last 3 days.Patient specifies abdominal pain is in his LUQ and LLQ that intermittently radiates to shoulder. Patient also endorses chest pain he describes as cramping and burning which he associates with the increased strain of emesis. Patient states that he fainted while walking his dog last evening. Patient denies any dizziness or visual field disturbances preceding fall but endorses increased fatigue x 3-4 days and does not believe he hit his head. Patient with scant bruising to bilateral knees and elbow. Patient states nausea, abd pain, and fatigue are mildly improved since arrival. He denies fever/chills, SOB, cough, or dysuria. Patient with prior alcohol use disorder diagnosis. States last drink was 1 glass of red wine 3-4 days PTA.Patient denies any recent binge drinking episodes.    In the ED, patient tachycardic otherwise vitals stable, afebrile. CBC with stable anemia. Bicarb 17. Anion gap 18. Alk phos 136. ALT 59. Lipase 31. Lactic 2.3>0.7. PETH pending. ETOH 27. EKG with ST, rate 115, no acute ischemic changes. UA pending. Xray R elbow with no fracture or dislocation. No bone destruction identified. Xray misti knees with no fracture or dislocation. No bone  destruction identified. Slight soft tissue swelling noted anterior to the right knee joint. CTH with no acute hemorrhage or major vascular territory infarct. Possible subtle sulcation abnormality with calcification in the right frontal lobe, similar to priors.  Appearance suggestive of a cortical malformation/developmental anomaly or possible remote insult. The patient received valium, droperidol x2, morphine x2, dilaudid, 2L NS, MVI, and thiamine.    Overview/Hospital Course:  Patient admitted for management of pancreatitis.  Patient requesting advancing diet, significant improvement in pain.  Diet advanced to soft diet.  AES consulted for pancreatic cyst.  UDS was positive for cocaine, opiates and marijuana.  Discussed with AES team, no emergent need for EGD or draining any of the pancreatic cyst.  Patient continues to have pain requiring IV pain medication.    Interval History:   3/8: EGD with esophagitis, returned to room, no new issues reported.     Review of Systems   Constitutional:  Positive for fatigue. Negative for chills, diaphoresis and fever.   HENT:  Negative for congestion, postnasal drip and rhinorrhea.    Eyes:  Negative for photophobia.   Respiratory:  Negative for cough, shortness of breath and wheezing.    Cardiovascular:  Negative for palpitations and leg swelling. Chest pain: burning/cramping.  Gastrointestinal:  Negative for abdominal distention, blood in stool and diarrhea.   Genitourinary:  Negative for dysuria, frequency and hematuria.   Musculoskeletal:  Negative for back pain, gait problem and myalgias.   Skin:  Positive for wound. Negative for pallor and rash.   Neurological:  Negative for dizziness, facial asymmetry, speech difficulty, numbness and headaches. Weakness: generalized.  Psychiatric/Behavioral:  Negative for confusion and hallucinations. The patient is not nervous/anxious.      Objective:     Vital Signs (Most Recent):  Temp: 97.6 °F (36.4 °C) (03/08/24 1109)  Pulse: 75  (03/08/24 1109)  Resp: 18 (03/08/24 1109)  BP: 118/76 (03/08/24 1109)  SpO2: 96 % (03/08/24 1109) Vital Signs (24h Range):  Temp:  [97.6 °F (36.4 °C)-98.4 °F (36.9 °C)] 97.6 °F (36.4 °C)  Pulse:  [75-97] 75  Resp:  [10-18] 18  SpO2:  [95 %-100 %] 96 %  BP: (112-127)/(61-83) 118/76     Weight: 63.5 kg (140 lb)  Body mass index is 20.09 kg/m².    Intake/Output Summary (Last 24 hours) at 3/8/2024 1242  Last data filed at 3/8/2024 0916  Gross per 24 hour   Intake 2029.71 ml   Output --   Net 2029.71 ml      Physical Exam  Vitals and nursing note reviewed.   Constitutional:       General: He is not in acute distress.     Appearance: He is not toxic-appearing or diaphoretic.   HENT:      Head: Normocephalic and atraumatic.      Nose: Nose normal.      Mouth/Throat:      Mouth: Mucous membranes are moist.   Eyes:      Pupils: Pupils are equal, round, and reactive to light.   Cardiovascular:      Rate and Rhythm: Normal rate and regular rhythm.      Pulses: Normal pulses.   Pulmonary:      Effort: Pulmonary effort is normal. No respiratory distress.      Breath sounds: No wheezing, rhonchi or rales.   Abdominal:      General: Bowel sounds are normal. There is no distension.      Palpations: Abdomen is soft.      Tenderness: There is abdominal tenderness in the epigastric area, left upper quadrant and left lower quadrant. There is no guarding.   Musculoskeletal:         General: No swelling, tenderness or deformity. Normal range of motion.      Right elbow: Normal range of motion.      Cervical back: Normal range of motion.      Right knee: Normal range of motion.      Left knee: Normal range of motion.      Comments: Bruising and abrasions to misti knees and R elbow   Skin:     General: Skin is warm and dry.      Capillary Refill: Capillary refill takes less than 2 seconds.   Neurological:      General: No focal deficit present.      Mental Status: He is alert and oriented to person, place, and time.      Sensory: No sensory  deficit.      Motor: No weakness.   Psychiatric:         Mood and Affect: Mood normal.         Behavior: Behavior normal.         MELD 3.0: 8 at 12/15/2023  3:50 AM  MELD-Na: 7 at 12/15/2023  3:50 AM  Calculated from:  Serum Creatinine: 0.5 mg/dL (Using min of 1 mg/dL) at 12/15/2023  3:50 AM  Serum Sodium: 138 mmol/L (Using max of 137 mmol/L) at 12/15/2023  3:50 AM  Total Bilirubin: 0.2 mg/dL (Using min of 1 mg/dL) at 12/15/2023  3:50 AM  Serum Albumin: 2.7 g/dL at 12/15/2023  3:50 AM  INR(ratio): 1.1 at 12/13/2023  8:30 AM  Age at listing (hypothetical): 23 years  Sex: Male at 12/15/2023  3:50 AM      Significant Labs:  CBC:  Recent Labs   Lab 03/07/24  0248   WBC 5.16   HGB 8.8*   HCT 30.9*        CMP:  Recent Labs   Lab 03/07/24  0248      K 4.6      CO2 25      BUN 4*   CREATININE 0.7   CALCIUM 8.7   PROT 6.0   ALBUMIN 2.8*   BILITOT 0.2   ALKPHOS 77   AST 49*   ALT 10   ANIONGAP 9       Assessment/Plan:      * Acute on chronic pancreatitis  -Afebrile, no leukocytosis.  -Lipase 31  -Lactic 2.3>0.7 s/p IVF.  -CT AP : Sequela pancreatitis with multiple fluid collections in the upper abdomen, some of which are new/worse and some which have improved from prior study.  New thin walled collection adjacent to the stomach measures up to 4 cm in size.   -Continue IVF.  -CLD for now, advance as tolerate.  -PRN pain and nausea control.  -Encouraged EtOH cessation.    High anion gap metabolic acidosis  -Bicarb 17, anion gap 18 on admit likely due to starvation ketoacidosis vs alcoholic ketoacidosis although patient denies significant EtOH consumption.  -Continue IVF.  -PRN antiemetics.  -Monitor on labs.  - EGD for 2/8    resolved    Chronic pain syndrome  -History noted.  -Currently on IV opiates in the setting of acute on chronic pancreatitis.    Pancreatic pseudocyst/cyst  -Chronic issue.  -CT AP: Sequela pancreatitis with multiple fluid collections in the upper abdomen, some of which are  new/worse and some which have improved from prior study.  New thin walled collection adjacent to the stomach measures up to 4 cm in size.   AES consult, no acute intervention    Alcohol use disorder  Hx of EtOH use disorder. Reports cutting back on EtOH consumption since 9/2023 but still drinks rum or wine occasionally but unable to quantify how much or how often. States his last drink was a glass of wine about 3 days ago. Denies hx of ETOH withdrawal.  -EtOH level 27, UDS pending.  -PETH in process.  -Nursing to perform CIWA q shift although low concern for withdrawal  -Initiate folic acid, thiamine, and MVI.    -Discussed the dangers of continued ETOH use with Pt and apparent systemic effects of his abuse.      Splenic vein thrombosis  Patient has history of splenic vein thrombosis with gastric varices (11/2023 on CTA) in the setting of polycythemia vera and chronic pancreatitis. He is not currently on anticoagulation.     Mild intermittent asthma without complication  -No s/s of acute exacerbation.  -PRN albuterol inhaler.    Hematemesis  Patient with history of known MW tear noted on previous EGD 9/2023. Patient says he has a small amount of blood in his vomit. He was told he didn't need to take protonix, so he has not taken it in a few months. Will restart PPI and give a dose of carafate for burning pain. Patient reports he has not vomited in several hours and is tolerating sips of water currently.     - restart protonix BID  - EGD completed on 3/8 with esophagitis    Syncope  Patient reports passing out while walking his dog last night. Endorses increased fatigue and generalized weakness due to significant nausea, vomiting, and decreased PO intake. Denies any significant chest pain, SOB, lightheadedness, or vision changes prior to the episode. Unsure if he struck his head but no obvious signs of head trauma on exam   Likely related to significant dehydration.   -Labs with high anion gap metabolic acidosis.  Lactic 2.3>0.7. EtOH 27  -UA/UDS pending.  -EKG with ST, no acute ischemic changes  -Check orthostatics.  -CTH with no acute hemorrhage or major vascular territory infarct. Possible subtle sulcation abnormality with calcification in the right frontal lobe, similar to priors.  -Continue IVF.  -Tele-monitoring.   -Fall precautions.     Polycythemia vera  Currently stable, has not required phlebotomy for several months due to hematocrit within normal limits.   -Follows outpatient with hematology  -Not on AC due to hx of rectal bleeding     HIV infection  Patient was diagnosed with HIV 2 years ago and has been on anti retroviral therapy. He is sexually active with male partners. His most recent CD4 count was 1080 (10/2023).     - Continue home biktarvy       VTE Risk Mitigation (From admission, onward)           Ordered     IP VTE HIGH RISK PATIENT  Once         03/04/24 1825     Place sequential compression device  Until discontinued         03/04/24 1825                    Discharge Planning   CASTILLO: 3/8/2024     Code Status: Full Code   Is the patient medically ready for discharge?:     Reason for patient still in hospital (select all that apply): Patient trending condition  Discharge Plan A: Home with family                  Brigida Barrow MD  Department of Hospital Medicine   [facility] - Med Surg ([place]

## 2024-03-08 NOTE — NURSING TRANSFER
..Nursing Transfer Note      Reason patient is being transferred: post procedure    Transfer To: [id number]    Transfer via stretcher    Transfer with na    Transported by transport    Medicines sent: none    Any special needs or follow-up needed: routine    Chart send with patient: Yes    Notified: no one on text    Patient reassessed at: 9035 3/8/2024

## 2024-03-08 NOTE — TREATMENT PLAN
AES Treatment Plan  Patient discussed with AES staff    US w/ biliary ductal dilation 9mm but relatively normal LFTs (AST mildly elevated 49, otherwise normal). No cholelithiasis.    Planned for EGD today. No plans for EUS at this time    AES will sign off. Please call if any questions/concerns.     Eren Orellana MD  Gastroenterology & Hepatology Fellow

## 2024-03-09 PROCEDURE — 94761 N-INVAS EAR/PLS OXIMETRY MLT: CPT

## 2024-03-09 PROCEDURE — 25000003 PHARM REV CODE 250: Performed by: NURSE PRACTITIONER

## 2024-03-09 PROCEDURE — 25000003 PHARM REV CODE 250

## 2024-03-09 PROCEDURE — 11000001 HC ACUTE MED/SURG PRIVATE ROOM

## 2024-03-09 PROCEDURE — 63600175 PHARM REV CODE 636 W HCPCS: Performed by: HOSPITALIST

## 2024-03-09 PROCEDURE — 25000003 PHARM REV CODE 250: Performed by: HOSPITALIST

## 2024-03-09 RX ORDER — ALPRAZOLAM 0.5 MG/1
0.5 TABLET ORAL 3 TIMES DAILY PRN
Qty: 10 TABLET | Refills: 0 | Status: SHIPPED | OUTPATIENT
Start: 2024-03-09 | End: 2024-05-01

## 2024-03-09 RX ORDER — KETOCONAZOLE 20 MG/ML
SHAMPOO, SUSPENSION TOPICAL DAILY
Qty: 120 ML | Refills: 0 | Status: SHIPPED | OUTPATIENT
Start: 2024-03-09 | End: 2024-05-01

## 2024-03-09 RX ORDER — OXYCODONE AND ACETAMINOPHEN 10; 325 MG/1; MG/1
1 TABLET ORAL EVERY 4 HOURS PRN
Qty: 21 TABLET | Refills: 0 | Status: SHIPPED | OUTPATIENT
Start: 2024-03-09 | End: 2024-05-01

## 2024-03-09 RX ORDER — HYDROMORPHONE HYDROCHLORIDE 2 MG/1
6 TABLET ORAL EVERY 4 HOURS PRN
Qty: 21 TABLET | Refills: 0 | Status: SHIPPED | OUTPATIENT
Start: 2024-03-09 | End: 2024-03-09 | Stop reason: HOSPADM

## 2024-03-09 RX ORDER — METHOCARBAMOL 500 MG/1
500 TABLET, FILM COATED ORAL 4 TIMES DAILY
Qty: 40 TABLET | Refills: 0 | Status: SHIPPED | OUTPATIENT
Start: 2024-03-09 | End: 2024-03-20

## 2024-03-09 RX ADMIN — PANTOPRAZOLE SODIUM 40 MG: 40 TABLET, DELAYED RELEASE ORAL at 08:03

## 2024-03-09 RX ADMIN — HYDROMORPHONE HYDROCHLORIDE 1.5 MG: 1 INJECTION, SOLUTION INTRAMUSCULAR; INTRAVENOUS; SUBCUTANEOUS at 05:03

## 2024-03-09 RX ADMIN — SENNOSIDES AND DOCUSATE SODIUM 2 TABLET: 8.6; 5 TABLET ORAL at 08:03

## 2024-03-09 RX ADMIN — SENNOSIDES AND DOCUSATE SODIUM 2 TABLET: 8.6; 5 TABLET ORAL at 09:03

## 2024-03-09 RX ADMIN — THERA TABS 1 TABLET: TAB at 09:03

## 2024-03-09 RX ADMIN — PANTOPRAZOLE SODIUM 40 MG: 40 TABLET, DELAYED RELEASE ORAL at 09:03

## 2024-03-09 RX ADMIN — PREGABALIN 75 MG: 75 CAPSULE ORAL at 09:03

## 2024-03-09 RX ADMIN — SUCRALFATE 1 G: 1 SUSPENSION ORAL at 12:03

## 2024-03-09 RX ADMIN — KETOCONAZOLE: 20 SHAMPOO, SUSPENSION TOPICAL at 09:03

## 2024-03-09 RX ADMIN — SUCRALFATE 1 G: 1 SUSPENSION ORAL at 05:03

## 2024-03-09 RX ADMIN — Medication 100 MG: at 09:03

## 2024-03-09 RX ADMIN — HYDROMORPHONE HYDROCHLORIDE 1.5 MG: 1 INJECTION, SOLUTION INTRAMUSCULAR; INTRAVENOUS; SUBCUTANEOUS at 04:03

## 2024-03-09 RX ADMIN — METHOCARBAMOL 500 MG: 500 TABLET ORAL at 04:03

## 2024-03-09 RX ADMIN — SUCRALFATE 1 G: 1 SUSPENSION ORAL at 06:03

## 2024-03-09 RX ADMIN — SUCRALFATE 1 G: 1 SUSPENSION ORAL at 01:03

## 2024-03-09 RX ADMIN — METHOCARBAMOL 500 MG: 500 TABLET ORAL at 09:03

## 2024-03-09 RX ADMIN — BICTEGRAVIR SODIUM, EMTRICITABINE, AND TENOFOVIR ALAFENAMIDE FUMARATE 1 TABLET: 50; 200; 25 TABLET ORAL at 09:03

## 2024-03-09 RX ADMIN — HYDROMORPHONE HYDROCHLORIDE 1.5 MG: 1 INJECTION, SOLUTION INTRAMUSCULAR; INTRAVENOUS; SUBCUTANEOUS at 08:03

## 2024-03-09 RX ADMIN — METHOCARBAMOL 500 MG: 500 TABLET ORAL at 12:03

## 2024-03-09 RX ADMIN — Medication 6 MG: at 08:03

## 2024-03-09 RX ADMIN — FOLIC ACID 1 MG: 1 TABLET ORAL at 09:03

## 2024-03-09 RX ADMIN — ALPRAZOLAM 0.5 MG: 0.5 TABLET ORAL at 06:03

## 2024-03-09 RX ADMIN — HYDROMORPHONE HYDROCHLORIDE 1.5 MG: 1 INJECTION, SOLUTION INTRAMUSCULAR; INTRAVENOUS; SUBCUTANEOUS at 09:03

## 2024-03-09 RX ADMIN — METHOCARBAMOL 500 MG: 500 TABLET ORAL at 08:03

## 2024-03-09 RX ADMIN — PREGABALIN 75 MG: 75 CAPSULE ORAL at 08:03

## 2024-03-09 RX ADMIN — HYDROMORPHONE HYDROCHLORIDE 1.5 MG: 1 INJECTION, SOLUTION INTRAMUSCULAR; INTRAVENOUS; SUBCUTANEOUS at 01:03

## 2024-03-09 NOTE — PLAN OF CARE
Problem: Adult Inpatient Plan of Care  Goal: Plan of Care Review  Outcome: Ongoing, Progressing  Flowsheets (Taken 3/9/2024 1750)  Plan of Care Reviewed With: patient  Goal: Patient-Specific Goal (Individualized)  Outcome: Ongoing, Progressing  Goal: Optimal Comfort and Wellbeing  Outcome: Ongoing, Progressing     Problem: Pain Acute  Goal: Acceptable Pain Control and Functional Ability  Outcome: Ongoing, Progressing     Problem: Fluid Imbalance (Pancreatitis)  Goal: Fluid Balance  Outcome: Ongoing, Progressing  Intervention: Monitor and Manage Fluid Balance  Flowsheets (Taken 3/9/2024 1750)  Fluid/Electrolyte Management: electrolyte supplement initiated     Problem: Infection (Pancreatitis)  Goal: Infection Symptom Resolution  Outcome: Ongoing, Progressing  Pt AAOX4, VS stable, continues on LR at 125 ml/hr. Rate pain prior admin 9/10. Pain rate post med 7/10. Scheduled meds and prn given urinal placed within pt reach instructed the pt to call for assitance if needed, call light within reach. No acute events noted during this shift. Plan of care ongoing.

## 2024-03-09 NOTE — PLAN OF CARE
Problem: Adult Inpatient Plan of Care  Goal: Plan of Care Review  Outcome: Ongoing, Progressing     Problem: Adult Inpatient Plan of Care  Goal: Patient-Specific Goal (Individualized)  Outcome: Ongoing, Progressing     Problem: Adult Inpatient Plan of Care  Goal: Absence of Hospital-Acquired Illness or Injury  Outcome: Ongoing, Progressing     Problem: Adult Inpatient Plan of Care  Goal: Optimal Comfort and Wellbeing  Outcome: Ongoing, Progressing     Problem: Adult Inpatient Plan of Care  Goal: Readiness for Transition of Care  Outcome: Ongoing, Progressing     Problem: Pain Acute  Goal: Acceptable Pain Control and Functional Ability  Outcome: Ongoing, Progressing     Problem: Fluid Imbalance (Pancreatitis)  Goal: Fluid Balance  Outcome: Ongoing, Progressing     Problem: Infection (Pancreatitis)  Goal: Infection Symptom Resolution  Outcome: Ongoing, Progressing     Problem: Pain (Pancreatitis)  Goal: Acceptable Pain Control  Outcome: Ongoing, Progressing   POC reviewed patient verbalize understanding.Patient AAOX4 no acute distress noted VSS RA  pain controlled throughout this shift per orders. PIV c/d/I LR infusing 125ml/hr without difficulties. Pt. Remains free from falls or injuries this shift. Safety measures maintained call bell within reach bed in the lowest position. Hourly rounding completed no acute events this shift.

## 2024-03-09 NOTE — PLAN OF CARE
Problem: Adult Inpatient Plan of Care  Goal: Plan of Care Review  Outcome: Ongoing, Progressing     Problem: Pain Acute  Goal: Acceptable Pain Control and Functional Ability  Outcome: Ongoing, Progressing     Problem: Pain (Pancreatitis)  Goal: Acceptable Pain Control  Outcome: Ongoing, Progressing

## 2024-03-09 NOTE — PROGRESS NOTES
[facility] ([address] Medicine  Progress Note    Patient Name: Tasia Cradona  MRN: [id number]  Patient Class: IP- Inpatient   Admission Date: 3/4/2024  Length of Stay: 5 days  Attending Physician: Brigida Barrow MD  Primary Care Provider: Pina Jones NP        Subjective:     Principal Problem:Acute on chronic pancreatitis        HPI:  Tasia Cardona is a 23 y.o. male with a PMHx of alcohol use disorder, HIV, chronic pancreatitis, asthma, splenic vein thrombosis, anemia, and polycythemia vera who presents to the ED with complaints of abdominal pain, nausea, and vomiting x3 days. States that this feels similar to his pancreatitis flares. Patient reports multiple episodes of bilious, bloody tinged emesis for the last 3 days.Patient specifies abdominal pain is in his LUQ and LLQ that intermittently radiates to shoulder. Patient also endorses chest pain he describes as cramping and burning which he associates with the increased strain of emesis. Patient states that he fainted while walking his dog last evening. Patient denies any dizziness or visual field disturbances preceding fall but endorses increased fatigue x 3-4 days and does not believe he hit his head. Patient with scant bruising to bilateral knees and elbow. Patient states nausea, abd pain, and fatigue are mildly improved since arrival. He denies fever/chills, SOB, cough, or dysuria. Patient with prior alcohol use disorder diagnosis. States last drink was 1 glass of red wine 3-4 days PTA.Patient denies any recent binge drinking episodes.    In the ED, patient tachycardic otherwise vitals stable, afebrile. CBC with stable anemia. Bicarb 17. Anion gap 18. Alk phos 136. ALT 59. Lipase 31. Lactic 2.3>0.7. PETH pending. ETOH 27. EKG with ST, rate 115, no acute ischemic changes. UA pending. Xray R elbow with no fracture or dislocation. No bone destruction identified. Xray misti knees with no fracture or dislocation. No bone  destruction identified. Slight soft tissue swelling noted anterior to the right knee joint. CTH with no acute hemorrhage or major vascular territory infarct. Possible subtle sulcation abnormality with calcification in the right frontal lobe, similar to priors.  Appearance suggestive of a cortical malformation/developmental anomaly or possible remote insult. The patient received valium, droperidol x2, morphine x2, dilaudid, 2L NS, MVI, and thiamine.    Overview/Hospital Course:  Patient admitted for management of pancreatitis.  Patient requesting advancing diet, significant improvement in pain.  Diet advanced to soft diet.  AES consulted for pancreatic cyst.  UDS was positive for cocaine, opiates and marijuana.  Discussed with AES team, no emergent need for EGD or draining any of the pancreatic cyst.  Patient continues to have pain requiring IV pain medication.    Interval History:   3/9: No further hematemesis.     Review of Systems   Constitutional:  Positive for fatigue. Negative for chills, diaphoresis and fever.   HENT:  Negative for congestion, postnasal drip and rhinorrhea.    Eyes:  Negative for photophobia.   Respiratory:  Negative for cough, shortness of breath and wheezing.    Cardiovascular:  Negative for palpitations and leg swelling. Chest pain: burning/cramping.  Gastrointestinal:  Negative for abdominal distention, blood in stool and diarrhea.   Genitourinary:  Negative for dysuria, frequency and hematuria.   Musculoskeletal:  Negative for back pain, gait problem and myalgias.   Skin:  Positive for wound. Negative for pallor and rash.   Neurological:  Negative for dizziness, facial asymmetry, speech difficulty, numbness and headaches. Weakness: generalized.  Psychiatric/Behavioral:  Negative for confusion and hallucinations. The patient is not nervous/anxious.      Objective:     Vital Signs (Most Recent):  Temp: 98 °F (36.7 °C) (03/09/24 1139)  Pulse: 76 (03/09/24 1139)  Resp: 16 (03/09/24 1139)  BP:  125/82 (03/09/24 1139)  SpO2: 98 % (03/09/24 1139) Vital Signs (24h Range):  Temp:  [98 °F (36.7 °C)-98.5 °F (36.9 °C)] 98 °F (36.7 °C)  Pulse:  [69-89] 76  Resp:  [16-18] 16  SpO2:  [95 %-99 %] 98 %  BP: (120-135)/(75-84) 125/82     Weight: 63.5 kg (140 lb)  Body mass index is 20.09 kg/m².    Intake/Output Summary (Last 24 hours) at 3/9/2024 1142  Last data filed at 3/9/2024 0530  Gross per 24 hour   Intake 880 ml   Output --   Net 880 ml      Physical Exam  Vitals and nursing note reviewed.   Constitutional:       General: He is not in acute distress.     Appearance: He is not toxic-appearing or diaphoretic.   HENT:      Head: Normocephalic and atraumatic.      Nose: Nose normal.      Mouth/Throat:      Mouth: Mucous membranes are moist.   Eyes:      Pupils: Pupils are equal, round, and reactive to light.   Cardiovascular:      Rate and Rhythm: Normal rate and regular rhythm.      Pulses: Normal pulses.   Pulmonary:      Effort: Pulmonary effort is normal. No respiratory distress.      Breath sounds: No wheezing, rhonchi or rales.   Abdominal:      General: Bowel sounds are normal. There is no distension.      Palpations: Abdomen is soft.      Tenderness: There is abdominal tenderness in the epigastric area, left upper quadrant and left lower quadrant. There is no guarding.   Musculoskeletal:         General: No swelling, tenderness or deformity. Normal range of motion.      Right elbow: Normal range of motion.      Cervical back: Normal range of motion.      Right knee: Normal range of motion.      Left knee: Normal range of motion.      Comments: Bruising and abrasions to misti knees and R elbow   Skin:     General: Skin is warm and dry.      Capillary Refill: Capillary refill takes less than 2 seconds.   Neurological:      General: No focal deficit present.      Mental Status: He is alert and oriented to person, place, and time.      Sensory: No sensory deficit.      Motor: No weakness.   Psychiatric:          Mood and Affect: Mood normal.         Behavior: Behavior normal.         MELD 3.0: 8 at 12/15/2023  3:50 AM  MELD-Na: 7 at 12/15/2023  3:50 AM  Calculated from:  Serum Creatinine: 0.5 mg/dL (Using min of 1 mg/dL) at 12/15/2023  3:50 AM  Serum Sodium: 138 mmol/L (Using max of 137 mmol/L) at 12/15/2023  3:50 AM  Total Bilirubin: 0.2 mg/dL (Using min of 1 mg/dL) at 12/15/2023  3:50 AM  Serum Albumin: 2.7 g/dL at 12/15/2023  3:50 AM  INR(ratio): 1.1 at 12/13/2023  8:30 AM  Age at listing (hypothetical): 23 years  Sex: Male at 12/15/2023  3:50 AM        Assessment/Plan:      * Acute on chronic pancreatitis  -Afebrile, no leukocytosis.  -Lipase 31  -Lactic 2.3>0.7 s/p IVF.  -CT AP : Sequela pancreatitis with multiple fluid collections in the upper abdomen, some of which are new/worse and some which have improved from prior study.  New thin walled collection adjacent to the stomach measures up to 4 cm in size.   -Continue IVF.  -CLD for now, advance as tolerate.  -PRN pain and nausea control.  -Encouraged EtOH cessation.    High anion gap metabolic acidosis  -Bicarb 17, anion gap 18 on admit likely due to starvation ketoacidosis vs alcoholic ketoacidosis although patient denies significant EtOH consumption.  -Continue IVF.  -PRN antiemetics.  -Monitor on labs.  - EGD for 2/8    resolved    Chronic pain syndrome  -History noted.  -Currently on IV opiates in the setting of acute on chronic pancreatitis.    Pancreatic pseudocyst/cyst  -Chronic issue.  -CT AP: Sequela pancreatitis with multiple fluid collections in the upper abdomen, some of which are new/worse and some which have improved from prior study.  New thin walled collection adjacent to the stomach measures up to 4 cm in size.   AES consult, no acute intervention    Alcohol use disorder  Hx of EtOH use disorder. Reports cutting back on EtOH consumption since 9/2023 but still drinks rum or wine occasionally but unable to quantify how much or how often. States his last  drink was a glass of wine about 3 days ago. Denies hx of ETOH withdrawal.  -EtOH level 27, UDS pending.  -PETH in process.  -Nursing to perform CIWA q shift although low concern for withdrawal  -Initiate folic acid, thiamine, and MVI.    -Discussed the dangers of continued ETOH use with Pt and apparent systemic effects of his abuse.      Splenic vein thrombosis  Patient has history of splenic vein thrombosis with gastric varices (11/2023 on CTA) in the setting of polycythemia vera and chronic pancreatitis. He is not currently on anticoagulation.     Mild intermittent asthma without complication  -No s/s of acute exacerbation.  -PRN albuterol inhaler.    Hematemesis  Patient with history of known MW tear noted on previous EGD 9/2023. Patient says he has a small amount of blood in his vomit. He was told he didn't need to take protonix, so he has not taken it in a few months. Will restart PPI and give a dose of carafate for burning pain. Patient reports he has not vomited in several hours and is tolerating sips of water currently.     - restart protonix BID  - EGD completed on 3/8 with esophagitis    Syncope  Patient reports passing out while walking his dog last night. Endorses increased fatigue and generalized weakness due to significant nausea, vomiting, and decreased PO intake. Denies any significant chest pain, SOB, lightheadedness, or vision changes prior to the episode. Unsure if he struck his head but no obvious signs of head trauma on exam   Likely related to significant dehydration.   -Labs with high anion gap metabolic acidosis. Lactic 2.3>0.7. EtOH 27  -UA/UDS pending.  -EKG with ST, no acute ischemic changes  -Check orthostatics.  -CTH with no acute hemorrhage or major vascular territory infarct. Possible subtle sulcation abnormality with calcification in the right frontal lobe, similar to priors.  -Continue IVF.  -Tele-monitoring.   -Fall precautions.     Polycythemia vera  Currently stable, has not  required phlebotomy for several months due to hematocrit within normal limits.   -Follows outpatient with hematology  -Not on AC due to hx of rectal bleeding     HIV infection  Patient was diagnosed with HIV 2 years ago and has been on anti retroviral therapy. He is sexually active with male partners. His most recent CD4 count was 1080 (10/2023).     - Continue home biktarvy       VTE Risk Mitigation (From admission, onward)           Ordered     IP VTE HIGH RISK PATIENT  Once         03/04/24 1825     Place sequential compression device  Until discontinued         03/04/24 1825                    Discharge Planning   CASTILLO: 3/11/2024     Code Status: Full Code   Is the patient medically ready for discharge?:     Reason for patient still in hospital (select all that apply): Patient trending condition  Discharge Plan A: Home with family                  Brigida Barrow MD  Department of Hospital Medicine   [facility] ([place]

## 2024-03-09 NOTE — SUBJECTIVE & OBJECTIVE
Interval History:   3/9: No further hematemesis.     Review of Systems   Constitutional:  Positive for fatigue. Negative for chills, diaphoresis and fever.   HENT:  Negative for congestion, postnasal drip and rhinorrhea.    Eyes:  Negative for photophobia.   Respiratory:  Negative for cough, shortness of breath and wheezing.    Cardiovascular:  Negative for palpitations and leg swelling. Chest pain: burning/cramping.  Gastrointestinal:  Negative for abdominal distention, blood in stool and diarrhea.   Genitourinary:  Negative for dysuria, frequency and hematuria.   Musculoskeletal:  Negative for back pain, gait problem and myalgias.   Skin:  Positive for wound. Negative for pallor and rash.   Neurological:  Negative for dizziness, facial asymmetry, speech difficulty, numbness and headaches. Weakness: generalized.  Psychiatric/Behavioral:  Negative for confusion and hallucinations. The patient is not nervous/anxious.      Objective:     Vital Signs (Most Recent):  Temp: 98 °F (36.7 °C) (03/09/24 1139)  Pulse: 76 (03/09/24 1139)  Resp: 16 (03/09/24 1139)  BP: 125/82 (03/09/24 1139)  SpO2: 98 % (03/09/24 1139) Vital Signs (24h Range):  Temp:  [98 °F (36.7 °C)-98.5 °F (36.9 °C)] 98 °F (36.7 °C)  Pulse:  [69-89] 76  Resp:  [16-18] 16  SpO2:  [95 %-99 %] 98 %  BP: (120-135)/(75-84) 125/82     Weight: 63.5 kg (140 lb)  Body mass index is 20.09 kg/m².    Intake/Output Summary (Last 24 hours) at 3/9/2024 1142  Last data filed at 3/9/2024 0530  Gross per 24 hour   Intake 880 ml   Output --   Net 880 ml      Physical Exam  Vitals and nursing note reviewed.   Constitutional:       General: He is not in acute distress.     Appearance: He is not toxic-appearing or diaphoretic.   HENT:      Head: Normocephalic and atraumatic.      Nose: Nose normal.      Mouth/Throat:      Mouth: Mucous membranes are moist.   Eyes:      Pupils: Pupils are equal, round, and reactive to light.   Cardiovascular:      Rate and Rhythm: Normal rate and  regular rhythm.      Pulses: Normal pulses.   Pulmonary:      Effort: Pulmonary effort is normal. No respiratory distress.      Breath sounds: No wheezing, rhonchi or rales.   Abdominal:      General: Bowel sounds are normal. There is no distension.      Palpations: Abdomen is soft.      Tenderness: There is abdominal tenderness in the epigastric area, left upper quadrant and left lower quadrant. There is no guarding.   Musculoskeletal:         General: No swelling, tenderness or deformity. Normal range of motion.      Right elbow: Normal range of motion.      Cervical back: Normal range of motion.      Right knee: Normal range of motion.      Left knee: Normal range of motion.      Comments: Bruising and abrasions to misti knees and R elbow   Skin:     General: Skin is warm and dry.      Capillary Refill: Capillary refill takes less than 2 seconds.   Neurological:      General: No focal deficit present.      Mental Status: He is alert and oriented to person, place, and time.      Sensory: No sensory deficit.      Motor: No weakness.   Psychiatric:         Mood and Affect: Mood normal.         Behavior: Behavior normal.         MELD 3.0: 8 at 12/15/2023  3:50 AM  MELD-Na: 7 at 12/15/2023  3:50 AM  Calculated from:  Serum Creatinine: 0.5 mg/dL (Using min of 1 mg/dL) at 12/15/2023  3:50 AM  Serum Sodium: 138 mmol/L (Using max of 137 mmol/L) at 12/15/2023  3:50 AM  Total Bilirubin: 0.2 mg/dL (Using min of 1 mg/dL) at 12/15/2023  3:50 AM  Serum Albumin: 2.7 g/dL at 12/15/2023  3:50 AM  INR(ratio): 1.1 at 12/13/2023  8:30 AM  Age at listing (hypothetical): 23 years  Sex: Male at 12/15/2023  3:50 AM

## 2024-03-10 VITALS
SYSTOLIC BLOOD PRESSURE: 128 MMHG | DIASTOLIC BLOOD PRESSURE: 68 MMHG | WEIGHT: 140 LBS | HEIGHT: 70 IN | TEMPERATURE: 98 F | RESPIRATION RATE: 18 BRPM | BODY MASS INDEX: 20.04 KG/M2 | OXYGEN SATURATION: 99 % | HEART RATE: 82 BPM

## 2024-03-10 PROCEDURE — 63600175 PHARM REV CODE 636 W HCPCS: Performed by: HOSPITALIST

## 2024-03-10 PROCEDURE — 25000003 PHARM REV CODE 250: Performed by: NURSE PRACTITIONER

## 2024-03-10 PROCEDURE — 25000003 PHARM REV CODE 250

## 2024-03-10 PROCEDURE — 25000003 PHARM REV CODE 250: Performed by: HOSPITALIST

## 2024-03-10 RX ORDER — OXYCODONE AND ACETAMINOPHEN 10; 325 MG/1; MG/1
1 TABLET ORAL EVERY 4 HOURS PRN
Status: DISCONTINUED | OUTPATIENT
Start: 2024-03-10 | End: 2024-03-10 | Stop reason: HOSPADM

## 2024-03-10 RX ORDER — PROMETHAZINE HYDROCHLORIDE 25 MG/1
25 TABLET ORAL EVERY 4 HOURS
Qty: 21 TABLET | Refills: 0 | Status: SHIPPED | OUTPATIENT
Start: 2024-03-10

## 2024-03-10 RX ADMIN — FOLIC ACID 1 MG: 1 TABLET ORAL at 09:03

## 2024-03-10 RX ADMIN — SUCRALFATE 1 G: 1 SUSPENSION ORAL at 06:03

## 2024-03-10 RX ADMIN — GUAIFENESIN AND DEXTROMETHORPHAN HYDROBROMIDE 1 TABLET: 600; 30 TABLET, EXTENDED RELEASE ORAL at 09:03

## 2024-03-10 RX ADMIN — METHOCARBAMOL 500 MG: 500 TABLET ORAL at 09:03

## 2024-03-10 RX ADMIN — PREGABALIN 75 MG: 75 CAPSULE ORAL at 09:03

## 2024-03-10 RX ADMIN — HYDROMORPHONE HYDROCHLORIDE 1.5 MG: 1 INJECTION, SOLUTION INTRAMUSCULAR; INTRAVENOUS; SUBCUTANEOUS at 06:03

## 2024-03-10 RX ADMIN — SUCRALFATE 1 G: 1 SUSPENSION ORAL at 12:03

## 2024-03-10 RX ADMIN — SENNOSIDES AND DOCUSATE SODIUM 2 TABLET: 8.6; 5 TABLET ORAL at 09:03

## 2024-03-10 RX ADMIN — KETOCONAZOLE: 20 SHAMPOO, SUSPENSION TOPICAL at 09:03

## 2024-03-10 RX ADMIN — THERA TABS 1 TABLET: TAB at 09:03

## 2024-03-10 RX ADMIN — HYDROMORPHONE HYDROCHLORIDE 1.5 MG: 1 INJECTION, SOLUTION INTRAMUSCULAR; INTRAVENOUS; SUBCUTANEOUS at 12:03

## 2024-03-10 RX ADMIN — BICTEGRAVIR SODIUM, EMTRICITABINE, AND TENOFOVIR ALAFENAMIDE FUMARATE 1 TABLET: 50; 200; 25 TABLET ORAL at 09:03

## 2024-03-10 RX ADMIN — Medication 100 MG: at 09:03

## 2024-03-10 RX ADMIN — ALPRAZOLAM 0.5 MG: 0.5 TABLET ORAL at 06:03

## 2024-03-10 RX ADMIN — PANTOPRAZOLE SODIUM 40 MG: 40 TABLET, DELAYED RELEASE ORAL at 09:03

## 2024-03-10 NOTE — NURSING
Pt given written and verbal discharge instructions. Pt verbalized understanding and follow up appts were fully explained. Pt IV access removed, nad or pain noted, dressing applied. Pt also received bedside prescriptions and currently waiting for his ride to arrive.

## 2024-03-10 NOTE — PLAN OF CARE
Problem: Adult Inpatient Plan of Care  Goal: Plan of Care Review  Outcome: Ongoing, Progressing     Problem: Adult Inpatient Plan of Care  Goal: Patient-Specific Goal (Individualized)  Outcome: Ongoing, Progressing     Problem: Pain Acute  Goal: Acceptable Pain Control and Functional Ability  Outcome: Ongoing, Progressing     Problem: Fluid Imbalance (Pancreatitis)  Goal: Fluid Balance  Outcome: Ongoing, Progressing   POC reviewed patient verbalize understanding.Patient AAOX4 no acute distress noted VSS RA  pain controlled throughout this shift per orders. PIV c/d/I LR infusing @ 125ml/hr without difficulties. Pt. Remains free from falls or injuries this shift. Safety measures maintained call bell within reach bed in the lowest position. Hourly rounding completed no acute events this shift.

## 2024-03-10 NOTE — DISCHARGE SUMMARY
DISCHARGE SUMMARY  Hospital Medicine    Team: [facility]    Patient Name: Tasia Cardona  YOB: 2000    Admit Date: 3/4/2024    Discharge Date: 03/10/2024    Discharge Attending Physician: Brigida Barrow MD     Admitting Resident    Diagnoses:  Active Hospital Problems    Diagnosis  POA    *Acute on chronic pancreatitis [K85.90, K86.1]  Yes    High anion gap metabolic acidosis [E87.29]  Yes    Chronic pain syndrome [G89.4]  Yes    Pancreatic pseudocyst/cyst [K86.2, K86.3]  Yes    Alcohol use disorder [F10.90]  Yes    Chest pain [R07.9]  Yes    Splenic vein thrombosis [I82.890]  Yes    Mild intermittent asthma without complication [J45.20]  Yes     Chronic    Hematemesis [K92.0]  Yes    Syncope [R55]  Yes    Polycythemia vera [D45]  Yes    HIV infection [B20]  Yes      Resolved Hospital Problems   No resolved problems to display.       Discharged Condition: admit problems have stabilized       Patient seen and examined on date of discharge. 45 men spent face to face time and medical decision making.     Physical Exam  Vitals and nursing note reviewed.   Constitutional:       General: He is not in acute distress.     Appearance: He is not toxic-appearing or diaphoretic.   HENT:      Head: Normocephalic and atraumatic.      Nose: Nose normal.      Mouth/Throat:      Mouth: Mucous membranes are moist.   Eyes:      Pupils: Pupils are equal, round, and reactive to light.   Cardiovascular:      Rate and Rhythm: Normal rate and regular rhythm.      Pulses: Normal pulses.   Pulmonary:      Effort: Pulmonary effort is normal. No respiratory distress.      Breath sounds: No wheezing, rhonchi or rales.   Abdominal:      General: Bowel sounds are normal. There is no distension.      Palpations: Abdomen is soft.      Tenderness: mild tenderness  Musculoskeletal:         General: No swelling, tenderness or deformity. Normal range of motion.      Right elbow: Normal range of motion.      Cervical back: Normal  range of motion.      Right knee: Normal range of motion.      Left knee: Normal range of motion.      Comments: Bruising and abrasions to misti knees and R elbow   Skin:     General: Skin is warm and dry.      Capillary Refill: Capillary refill takes less than 2 seconds.   Neurological:      General: No focal deficit present.      Mental Status: He is alert and oriented to person, place, and time.      Sensory: No sensory deficit.      Motor: No weakness.   Psychiatric:         Mood and Affect: Mood normal.         Behavior: Behavior normal.        HOSPITAL COURSE:      Initial Presentation:    Tasia Cardona is a 23 y.o. male with a PMHx of alcohol use disorder, HIV, chronic pancreatitis, asthma, splenic vein thrombosis, anemia, and polycythemia vera who presents to the ED with complaints of abdominal pain, nausea, and vomiting x3 days. States that this feels similar to his pancreatitis flares. Patient reports multiple episodes of bilious, bloody tinged emesis for the last 3 days.Patient specifies abdominal pain is in his LUQ and LLQ that intermittently radiates to shoulder. Patient also endorses chest pain he describes as cramping and burning which he associates with the increased strain of emesis. Patient states that he fainted while walking his dog last evening. Patient denies any dizziness or visual field disturbances preceding fall but endorses increased fatigue x 3-4 days and does not believe he hit his head. Patient with scant bruising to bilateral knees and elbow. Patient states nausea, abd pain, and fatigue are mildly improved since arrival. He denies fever/chills, SOB, cough, or dysuria. Patient with prior alcohol use disorder diagnosis. States last drink was 1 glass of red wine 3-4 days PTA.Patient denies any recent binge drinking episodes.     In the ED, patient tachycardic otherwise vitals stable, afebrile. CBC with stable anemia. Bicarb 17. Anion gap 18. Alk phos 136. ALT 59. Lipase 31. Lactic 2.3>0.7.  PETH pending. ETOH 27. EKG with ST, rate 115, no acute ischemic changes. UA pending. Xray R elbow with no fracture or dislocation. No bone destruction identified. Xray misti knees with no fracture or dislocation. No bone destruction identified. Slight soft tissue swelling noted anterior to the right knee joint. CTH with no acute hemorrhage or major vascular territory infarct. Possible subtle sulcation abnormality with calcification in the right frontal lobe, similar to priors.  Appearance suggestive of a cortical malformation/developmental anomaly or possible remote insult. The patient received valium, droperidol x2, morphine x2, dilaudid, 2L NS, MVI, and thiamine.       Course of Principle Problem for Admission:    Acute on chronic pancreatitis  Pancreatic pseudocyst/cyst   Hx of EtOH use disorder. Reports cutting back on EtOH consumption since 9/2023 but still drinks rum or wine occasionally but unable to quantify how much or how often. States his last drink was a glass of wine about 3 days ago. Denies hx of ETOH withdrawal.   -Afebrile, no leukocytosis.  -Lipase 31  -Lactic 2.3>0.7 s/p IVF.  -CT AP : [place] pancreatitis with multiple fluid collections in the upper abdomen, some of which are new/worse and some which have improved from prior study.  New thin walled collection adjacent to the stomach measures up to 4 cm in size.   - supportive management provided  AES consult, no acute intervention       Other Medical Problems Addressed in the Hospital:    Hematemesis  Patient with history of known MW tear noted on previous EGD 9/2023. Patient says he has a small amount of blood in his vomit. He was told he didn't need to take protonix, so he has not taken it in a few months. Will restart PPI and give a dose of carafate for burning pain. Patient reports he has not vomited in several hours and is tolerating sips of water currently.     - restart protonix BID  - EGD completed on 3/8 with esophagitis    CONSULTS:  AES    Last CBC/BMP/HgbA1c (if applicable):  Recent Results (from the past 336 hour(s))   CBC with Automated Differential    Collection Time: 03/07/24  2:48 AM   Result Value Ref Range    WBC 5.16 3.90 - 12.70 K/uL    Hemoglobin 8.8 (L) 14.0 - 18.0 g/dL    Hematocrit 30.9 (L) 40.0 - 54.0 %    Platelets 158 150 - 450 K/uL   CBC with Automated Differential    Collection Time: 03/06/24  5:55 AM   Result Value Ref Range    WBC 4.48 3.90 - 12.70 K/uL    Hemoglobin 9.3 (L) 14.0 - 18.0 g/dL    Hematocrit 32.2 (L) 40.0 - 54.0 %    Platelets 219 150 - 450 K/uL   CBC with Automated Differential    Collection Time: 03/05/24  3:38 AM   Result Value Ref Range    WBC 6.25 3.90 - 12.70 K/uL    Hemoglobin 8.6 (L) 14.0 - 18.0 g/dL    Hematocrit 29.9 (L) 40.0 - 54.0 %    Platelets 214 150 - 450 K/uL     No results found for this or any previous visit (from the past 336 hour(s)).  Lab Results   Component Value Date    HGBA1C 5.1 10/06/2023       Disposition:  Home      Future Scheduled Appointments:  No future appointments.    Follow-up Plans from This Hospitalization:  AES      Discharge Medication List:         Medication List        START taking these medications      ALPRAZolam 0.5 MG tablet  Commonly known as: XANAX  Take 1 tablet (0.5 mg total) by mouth 3 (three) times daily as needed for Anxiety.     ketoconazole 2 % shampoo  Commonly known as: NIZORAL  Apply topically once daily.     methocarbamoL 500 MG Tab  Commonly known as: ROBAXIN  Take 1 tablet (500 mg total) by mouth 4 (four) times daily. for 10 days     oxyCODONE-acetaminophen  mg per tablet  Commonly known as: PERCOCET  Take 1 tablet by mouth every 4 (four) hours as needed for Pain.            CONTINUE taking these medications      BIKTARVY -25 mg (25 kg or greater)  Generic drug: hqcrwvcav-uzzmqutn-suykbhs ala  Take 1 tablet by mouth once daily.     folic acid 1 MG tablet  Commonly known as: FOLVITE  Take 1 tablet (1 mg total) by mouth once daily.      omeprazole 20 MG capsule  Commonly known as: PRILOSEC  Take 1 capsule (20 mg total) by mouth once daily.     pantoprazole 40 MG tablet  Commonly known as: PROTONIX  Take 1 tablet (40 mg total) by mouth 2 (two) times daily.     pregabalin 150 MG capsule  Commonly known as: LYRICA  Take 1 capsule (150 mg total) by mouth 2 (two) times daily. for 14 days     * promethazine 25 MG suppository  Commonly known as: PHENERGAN  Place 1 suppository (25 mg total) rectally every 6 (six) hours as needed for Nausea.     * promethazine 25 MG suppository  Commonly known as: PHENERGAN  Place 1 suppository (25 mg total) rectally every 6 (six) hours as needed for Nausea.     senna 8.6 mg tablet  Commonly known as: SENOKOT     STOOL SOFTENER-LAXATIVE 8.6-50 mg per tablet  Generic drug: senna-docusate 8.6-50 mg  Take 2 tablets by mouth 2 (two) times daily as needed for Constipation.           * This list has 2 medication(s) that are the same as other medications prescribed for you. Read the directions carefully, and ask your doctor or other care provider to review them with you.                STOP taking these medications      HYDROmorphone 8 MG tablet  Commonly known as: DILAUDID     oxyCODONE 5 MG immediate release tablet  Commonly known as: ROXICODONE               Where to Get Your Medications        These medications were sent to [facility]  [address] [place] [id number]      Hours: Mon-Fri 7a-7p, Sat-Sun 10a-4p Phone: [phone]   ALPRAZolam 0.5 MG tablet  ketoconazole 2 % shampoo  methocarbamoL 500 MG Tab  oxyCODONE-acetaminophen  mg per tablet         Patient Instructions:  No discharge procedures on file.    Signing Physician:  Brigida Barrow MD

## 2024-03-10 NOTE — PLAN OF CARE
Patient advised to call PCP office to schedule seven day hospital follow up appointment. Family to provide transportation home.   03/10/24 0858   Final Note   Assessment Type Final Discharge Note   Anticipated Discharge Disposition Home   Hospital Resources/Appts/Education Provided Provided patient/caregiver with written discharge plan information   Post-Acute Status   Discharge Delays None known at this time     [facility] ([place] [id number])  Discharge Final Note    Primary Care Provider: Pina Jones NP    Expected Discharge Date: 3/10/2024    Final Discharge Note (most recent)       Final Note - 03/10/24 0858          Final Note    Assessment Type Final Discharge Note (P)      Anticipated Discharge Disposition Home or Self Care (P)      Hospital Resources/Appts/Education Provided Provided patient/caregiver with written discharge plan information (P)         Post-Acute Status    Discharge Delays None known at this time (P)                      Important Message from Medicare             Contact Info       Pina Jones NP   Specialty: Family Medicine   Relationship: PCP - General    [address]  [facility] [address]   Phone: [phone]       Next Steps: Schedule an appointment as soon as possible for a visit in 1 week(s)

## 2024-03-12 ENCOUNTER — HOSPITAL ENCOUNTER (EMERGENCY)
Facility: HOSPITAL | Age: 24
Discharge: HOME | End: 2024-03-13
Attending: EMERGENCY MEDICINE
Payer: MEDICAID

## 2024-03-12 DIAGNOSIS — R10.9 ABDOMINAL PAIN, UNSPECIFIED ABDOMINAL LOCATION: Primary | ICD-10-CM

## 2024-03-12 DIAGNOSIS — R00.0 TACHYCARDIA: ICD-10-CM

## 2024-03-12 PROCEDURE — 93010 ELECTROCARDIOGRAM REPORT: CPT | Mod: ,,, | Performed by: INTERNAL MEDICINE

## 2024-03-12 PROCEDURE — 93005 ELECTROCARDIOGRAM TRACING: CPT

## 2024-03-12 PROCEDURE — 99284 EMERGENCY DEPT VISIT MOD MDM: CPT | Mod: 25

## 2024-03-13 VITALS
DIASTOLIC BLOOD PRESSURE: 81 MMHG | SYSTOLIC BLOOD PRESSURE: 137 MMHG | HEART RATE: 87 BPM | TEMPERATURE: 98 F | OXYGEN SATURATION: 100 % | RESPIRATION RATE: 16 BRPM

## 2024-03-13 LAB
ALBUMIN SERPL BCP-MCNC: 3.9 G/DL (ref 3.5–5.2)
ALP SERPL-CCNC: 105 U/L (ref 55–135)
ALT SERPL W/O P-5'-P-CCNC: 38 U/L (ref 10–44)
ANION GAP SERPL CALC-SCNC: 11 MMOL/L (ref 8–16)
AST SERPL-CCNC: 46 U/L (ref 10–40)
BASOPHILS # BLD AUTO: 0.05 K/UL (ref 0–0.2)
BASOPHILS NFR BLD: 0.7 % (ref 0–1.9)
BILIRUB SERPL-MCNC: 0.2 MG/DL (ref 0.1–1)
BUN SERPL-MCNC: 7 MG/DL (ref 6–20)
CALCIUM SERPL-MCNC: 9.5 MG/DL (ref 8.7–10.5)
CHLORIDE SERPL-SCNC: 108 MMOL/L (ref 95–110)
CO2 SERPL-SCNC: 24 MMOL/L (ref 23–29)
CREAT SERPL-MCNC: 0.8 MG/DL (ref 0.5–1.4)
DIFFERENTIAL METHOD BLD: ABNORMAL
EOSINOPHIL # BLD AUTO: 0.3 K/UL (ref 0–0.5)
EOSINOPHIL NFR BLD: 4.6 % (ref 0–8)
ERYTHROCYTE [DISTWIDTH] IN BLOOD BY AUTOMATED COUNT: 18.4 % (ref 11.5–14.5)
EST. GFR  (NO RACE VARIABLE): >60 ML/MIN/1.73 M^2
GLUCOSE SERPL-MCNC: 97 MG/DL (ref 70–110)
HCT VFR BLD AUTO: 38.1 % (ref 40–54)
HGB BLD-MCNC: 10.7 G/DL (ref 14–18)
IMM GRANULOCYTES # BLD AUTO: 0.02 K/UL (ref 0–0.04)
IMM GRANULOCYTES NFR BLD AUTO: 0.3 % (ref 0–0.5)
LIPASE SERPL-CCNC: 32 U/L (ref 4–60)
LYMPHOCYTES # BLD AUTO: 3.2 K/UL (ref 1–4.8)
LYMPHOCYTES NFR BLD: 46.3 % (ref 18–48)
MCH RBC QN AUTO: 22.8 PG (ref 27–31)
MCHC RBC AUTO-ENTMCNC: 28.1 G/DL (ref 32–36)
MCV RBC AUTO: 81 FL (ref 82–98)
MONOCYTES # BLD AUTO: 0.7 K/UL (ref 0.3–1)
MONOCYTES NFR BLD: 10.4 % (ref 4–15)
NEUTROPHILS # BLD AUTO: 2.6 K/UL (ref 1.8–7.7)
NEUTROPHILS NFR BLD: 37.7 % (ref 38–73)
NRBC BLD-RTO: 0 /100 WBC
OHS QRS DURATION: 78 MS
OHS QTC CALCULATION: 442 MS
PLATELET # BLD AUTO: 366 K/UL (ref 150–450)
PMV BLD AUTO: 9.7 FL (ref 9.2–12.9)
POTASSIUM SERPL-SCNC: 4 MMOL/L (ref 3.5–5.1)
PROT SERPL-MCNC: 8.3 G/DL (ref 6–8.4)
RBC # BLD AUTO: 4.7 M/UL (ref 4.6–6.2)
SODIUM SERPL-SCNC: 143 MMOL/L (ref 136–145)
WBC # BLD AUTO: 6.81 K/UL (ref 3.9–12.7)

## 2024-03-13 PROCEDURE — 96375 TX/PRO/DX INJ NEW DRUG ADDON: CPT

## 2024-03-13 PROCEDURE — C9113 INJ PANTOPRAZOLE SODIUM, VIA: HCPCS | Performed by: PHYSICIAN ASSISTANT

## 2024-03-13 PROCEDURE — 80053 COMPREHEN METABOLIC PANEL: CPT | Performed by: PHYSICIAN ASSISTANT

## 2024-03-13 PROCEDURE — 85025 COMPLETE CBC W/AUTO DIFF WBC: CPT | Performed by: PHYSICIAN ASSISTANT

## 2024-03-13 PROCEDURE — 83690 ASSAY OF LIPASE: CPT | Performed by: PHYSICIAN ASSISTANT

## 2024-03-13 PROCEDURE — 96374 THER/PROPH/DIAG INJ IV PUSH: CPT

## 2024-03-13 PROCEDURE — 63600175 PHARM REV CODE 636 W HCPCS: Performed by: PHYSICIAN ASSISTANT

## 2024-03-13 PROCEDURE — 25000003 PHARM REV CODE 250: Performed by: PHYSICIAN ASSISTANT

## 2024-03-13 RX ORDER — DROPERIDOL 2.5 MG/ML
1.25 INJECTION, SOLUTION INTRAMUSCULAR; INTRAVENOUS
Status: COMPLETED | OUTPATIENT
Start: 2024-03-13 | End: 2024-03-13

## 2024-03-13 RX ORDER — DICYCLOMINE HYDROCHLORIDE 10 MG/ML
20 INJECTION INTRAMUSCULAR
Status: DISCONTINUED | OUTPATIENT
Start: 2024-03-13 | End: 2024-03-13

## 2024-03-13 RX ORDER — MORPHINE SULFATE 4 MG/ML
8 INJECTION, SOLUTION INTRAMUSCULAR; INTRAVENOUS
Status: COMPLETED | OUTPATIENT
Start: 2024-03-13 | End: 2024-03-13

## 2024-03-13 RX ORDER — DICYCLOMINE HYDROCHLORIDE 10 MG/1
20 CAPSULE ORAL
Status: COMPLETED | OUTPATIENT
Start: 2024-03-13 | End: 2024-03-13

## 2024-03-13 RX ORDER — ALUMINUM HYDROXIDE, MAGNESIUM HYDROXIDE, AND SIMETHICONE 1200; 120; 1200 MG/30ML; MG/30ML; MG/30ML
5 SUSPENSION ORAL
Status: DISCONTINUED | OUTPATIENT
Start: 2024-03-13 | End: 2024-03-13 | Stop reason: HOSPADM

## 2024-03-13 RX ORDER — KETOROLAC TROMETHAMINE 30 MG/ML
10 INJECTION, SOLUTION INTRAMUSCULAR; INTRAVENOUS
Status: COMPLETED | OUTPATIENT
Start: 2024-03-13 | End: 2024-03-13

## 2024-03-13 RX ORDER — PANTOPRAZOLE SODIUM 40 MG/10ML
40 INJECTION, POWDER, LYOPHILIZED, FOR SOLUTION INTRAVENOUS
Status: COMPLETED | OUTPATIENT
Start: 2024-03-13 | End: 2024-03-13

## 2024-03-13 RX ADMIN — DICYCLOMINE HYDROCHLORIDE 20 MG: 10 CAPSULE ORAL at 04:03

## 2024-03-13 RX ADMIN — PANTOPRAZOLE SODIUM 40 MG: 40 INJECTION, POWDER, FOR SOLUTION INTRAVENOUS at 01:03

## 2024-03-13 RX ADMIN — MORPHINE SULFATE 8 MG: 4 INJECTION INTRAVENOUS at 01:03

## 2024-03-13 RX ADMIN — DROPERIDOL 1.25 MG: 2.5 INJECTION, SOLUTION INTRAMUSCULAR; INTRAVENOUS at 12:03

## 2024-03-13 RX ADMIN — SODIUM CHLORIDE 2000 ML: 9 INJECTION, SOLUTION INTRAVENOUS at 01:03

## 2024-03-13 RX ADMIN — KETOROLAC TROMETHAMINE 10 MG: 30 INJECTION, SOLUTION INTRAMUSCULAR; INTRAVENOUS at 02:03

## 2024-03-13 NOTE — DISCHARGE INSTRUCTIONS
Thank you for coming to our Emergency Department today. It is important to remember that some problems are difficult to diagnose and may not be found during your Emergency Department visit. Be sure to follow up with your primary care doctor and review all labs/imaging/tests that were performed during this visit with them. Some labs/tests may be outside of the normal range and require non-emergent follow-up and further investigation to help diagnose/exclude/prevent complications or other medical conditions.    If you do not have a primary care doctor, you may contact the one listed on your discharge paperwork or you may also call the [facility] Appointment Desk at [phone] to schedule an appointment and establish care with one. It is important to your health that you have a primary care doctor.    Please take all medications as directed. All medications may potentially have side-effects and it is impossible to predict which medications may give you side-effects or what side-effects (if any) they will give you.. If you feel that you are having a negative effect or side-effect of any medication you should immediately stop taking them and seek medical attention. If you feel that you are having a life-threatening reaction call 911.    Return to the ER with any questions/concerns, new/concerning symptoms, worsening or failure to improve.     Do not drive, swim, climb to height, take a bath or make any important decisions for 24 hours if you have received any pain medications, sedatives or mood altering drugs during your ER visit.

## 2024-03-13 NOTE — ED NOTES
Tasia Cardona, an 23 y.o. male presents to the ED:    Review of patient's allergies indicates:   Allergen Reactions    [place] Swelling    Pecan nut Swelling    Penicillins Hives     Tolerates cephalosporins    Tolerated piperacillin/tazobactam 11/2023    Soy Other (See Comments)    Sulfa (sulfonamide antibiotics) Hives     Chief Complaint   Patient presents with    Abdominal Pain     Patient reports was DC Sunday for pancreatitis. States that his pain had gotten better but is now worsened. Endorses N/V.     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

## 2024-03-13 NOTE — ED PROVIDER NOTES
Encounter Date: 3/12/2024       History     Chief Complaint   Patient presents with    Abdominal Pain     Patient reports was DC Sunday for pancreatitis. States that his pain had gotten better but is now worsened. Endorses N/V.     23 y.o. male with a PMHx of alcohol use disorder, HIV, chronic pancreatitis, asthma, splenic vein thrombosis, anemia, and polycythemia vera who presents to the ED with complaints of abdominal pain, nausea, and vomiting.  Recent admission for similar, diagnosed with acute on chronic pancreatitis.  During admission had CT and EGD no new acute findings.  Multiple fluid collections noted within the abdomen, conservative management.  ED without no acute findings.  Patient states since discharge he has abstained from alcohol but began having pain again today.  Endorses nausea and emesis.  Tachycardic on arrival, otherwise appears comfortable and nondistressed.      Review of patient's allergies indicates:   Allergen Reactions    [place] Swelling    Pecan nut Swelling    Penicillins Hives     Tolerates cephalosporins    Tolerated piperacillin/tazobactam 11/2023    Soy Other (See Comments)    Sulfa (sulfonamide antibiotics) Hives     Past Medical History:   Diagnosis Date    Acute necrotizing pancreatitis 09/20/2023    Alcohol use disorder 10/05/2023    Asthma     Hepatitis C antibody positive in blood 09/18/2023 9/2023 PCR negative    HIV infection 10/2021    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     Past Surgical History:   Procedure Laterality Date    ENDOSCOPIC ULTRASOUND OF UPPER GASTROINTESTINAL TRACT N/A 10/23/2023    Procedure: ULTRASOUND, UPPER GI TRACT, ENDOSCOPIC;  Surgeon: Emil Villatoro MD;  Location: [address]);  Service: Endoscopy;  Laterality: N/A;    ERCP N/A  10/23/2023    Procedure: ERCP (ENDOSCOPIC RETROGRADE CHOLANGIOPANCREATOGRAPHY);  Surgeon: Emil Villatoro MD;  [facility]  Service: Endoscopy;  Laterality: N/A;    ESOPHAGOGASTRODUODENOSCOPY N/A 9/18/2023    Procedure: EGD (ESOPHAGOGASTRODUODENOSCOPY);  Surgeon: Kg Cleaning MD;  [facility]);  Service: Endoscopy;  Laterality: N/A;    ESOPHAGOGASTRODUODENOSCOPY N/A 3/8/2024    Procedure: EGD (ESOPHAGOGASTRODUODENOSCOPY);  Surgeon: Greg Love MD;  Location[facility]  Service: Endoscopy;  Laterality: N/A;     Family History   Problem Relation Age of Onset    Pancreatitis Mother     Cancer Mother     Ovarian cancer Mother     No Known Problems Father     Cancer Brother     Breast cancer Maternal Grandmother      Social History     Tobacco Use    Smoking status: Former     Types: Cigarettes    Smokeless tobacco: Never   Substance Use Topics    Alcohol use: Not Currently    Drug use: Never     Review of Systems   Constitutional:  Negative for fever.   HENT:  Negative for sore throat.    Respiratory:  Negative for shortness of breath.    Cardiovascular:  Negative for chest pain.   Gastrointestinal:  Positive for abdominal pain, nausea and vomiting.   Genitourinary:  Negative for dysuria.   Musculoskeletal:  Negative for back pain.   Skin:  Negative for rash.   Neurological:  Negative for weakness.   Hematological:  Does not bruise/bleed easily.       Physical Exam     Initial Vitals [03/12/24 2339]   BP Pulse Resp Temp SpO2   116/67 (!) 126 18 98 °F (36.7 °C) 97 %      MAP       --         Physical Exam    Constitutional: Vital signs are normal. He appears well-developed and well-nourished.   HENT:   Head: Normocephalic and atraumatic.   Right Ear: Hearing normal.   Left Ear: Hearing normal.   Eyes: Conjunctivae are normal.   Cardiovascular:  Normal rate and regular rhythm.           Abdominal: Abdomen is soft. Bowel sounds are normal.   Soft, mild tenderness,  upper abdomen   Musculoskeletal:         General: Normal range of motion.     Neurological: He is alert and oriented to person, place, and time.   Skin: Skin is warm and intact.   Psychiatric: He has a normal mood and affect. His speech is normal and behavior is normal. Cognition and memory are normal.         ED Course   Procedures  Labs Reviewed   CBC W/ AUTO DIFFERENTIAL - Abnormal; Notable for the following components:       Result Value    Hemoglobin 10.7 (*)     Hematocrit 38.1 (*)     MCV 81 (*)     MCH 22.8 (*)     MCHC 28.1 (*)     RDW 18.4 (*)     Gran % 37.7 (*)     All other components within normal limits   COMPREHENSIVE METABOLIC PANEL - Abnormal; Notable for the following components:    AST 46 (*)     All other components within normal limits   LIPASE   URINALYSIS, REFLEX TO URINE CULTURE          Imaging Results    None          Medications   aluminum-magnesium hydroxide-simethicone 200-200-20 mg/5 mL suspension 5 mL (5 mLs Oral Not Given 3/13/24 0404)   sodium chloride 0.9% bolus 2,000 mL 2,000 mL (2,000 mLs Intravenous New Bag 3/13/24 0111)   droPERidol injection 1.25 mg (1.25 mg Intravenous Given 3/13/24 0015)   morphine injection 8 mg (8 mg Intravenous Given 3/13/24 0110)   pantoprazole injection 40 mg (40 mg Intravenous Given 3/13/24 0117)   ketorolac injection 9.999 mg (9.999 mg Intravenous Given 3/13/24 0222)   dicyclomine capsule 20 mg (20 mg Oral Given 3/13/24 0404)     Medical Decision Making  23-year-old male with chronic abdominal pain, chronic pancreatitis presenting with similar   Differential pancreatitis, electrolyte derangement, anemia, dehydration      4:00 a.m. assessment  No acute findings on labs, symptoms improved after treatment in the ED. abdomen benign.  Patient is sleeping on reassessment.  Patient discharged home in stable condition, advised to follow-up with primary care and return to the ED as needed.        Amount and/or Complexity of Data Reviewed  Labs:  ordered.    Risk  OTC drugs.  Prescription drug management.                                      Clinical Impression:  Final diagnoses:  [R00.0] Tachycardia  [R10.9] Abdominal pain, unspecified abdominal location (Primary)          ED Disposition Condition    Discharge Stable          ED Prescriptions    None       Follow-up Information    None          Juan Briceño, PASUN  03/13/24 0410

## 2024-03-25 ENCOUNTER — HOSPITAL ENCOUNTER (EMERGENCY)
Facility: HOSPITAL | Age: 24
Discharge: HOME | End: 2024-03-25
Attending: EMERGENCY MEDICINE
Payer: MEDICAID

## 2024-03-25 VITALS
RESPIRATION RATE: 18 BRPM | BODY MASS INDEX: 20.04 KG/M2 | TEMPERATURE: 98 F | HEART RATE: 86 BPM | SYSTOLIC BLOOD PRESSURE: 125 MMHG | DIASTOLIC BLOOD PRESSURE: 76 MMHG | WEIGHT: 140 LBS | HEIGHT: 70 IN | OXYGEN SATURATION: 98 %

## 2024-03-25 DIAGNOSIS — R10.13 EPIGASTRIC ABDOMINAL PAIN: Primary | ICD-10-CM

## 2024-03-25 LAB
ALBUMIN SERPL BCP-MCNC: 4.4 G/DL (ref 3.5–5.2)
ALP SERPL-CCNC: 115 U/L (ref 55–135)
ALT SERPL W/O P-5'-P-CCNC: 11 U/L (ref 10–44)
ANION GAP SERPL CALC-SCNC: 12 MMOL/L (ref 8–16)
AST SERPL-CCNC: 17 U/L (ref 10–40)
BASOPHILS # BLD AUTO: 0.06 K/UL (ref 0–0.2)
BASOPHILS NFR BLD: 0.5 % (ref 0–1.9)
BILIRUB SERPL-MCNC: 0.5 MG/DL (ref 0.1–1)
BUN SERPL-MCNC: 9 MG/DL (ref 6–20)
CALCIUM SERPL-MCNC: 9.7 MG/DL (ref 8.7–10.5)
CHLORIDE SERPL-SCNC: 104 MMOL/L (ref 95–110)
CO2 SERPL-SCNC: 22 MMOL/L (ref 23–29)
CREAT SERPL-MCNC: 0.7 MG/DL (ref 0.5–1.4)
DIFFERENTIAL METHOD BLD: ABNORMAL
EOSINOPHIL # BLD AUTO: 0.1 K/UL (ref 0–0.5)
EOSINOPHIL NFR BLD: 1.2 % (ref 0–8)
ERYTHROCYTE [DISTWIDTH] IN BLOOD BY AUTOMATED COUNT: 18.4 % (ref 11.5–14.5)
EST. GFR  (NO RACE VARIABLE): >60 ML/MIN/1.73 M^2
GLUCOSE SERPL-MCNC: 96 MG/DL (ref 70–110)
HCT VFR BLD AUTO: 40.2 % (ref 40–54)
HGB BLD-MCNC: 11.9 G/DL (ref 14–18)
IMM GRANULOCYTES # BLD AUTO: 0.03 K/UL (ref 0–0.04)
IMM GRANULOCYTES NFR BLD AUTO: 0.3 % (ref 0–0.5)
LIPASE SERPL-CCNC: 18 U/L (ref 4–60)
LYMPHOCYTES # BLD AUTO: 4 K/UL (ref 1–4.8)
LYMPHOCYTES NFR BLD: 36.3 % (ref 18–48)
MCH RBC QN AUTO: 22.8 PG (ref 27–31)
MCHC RBC AUTO-ENTMCNC: 29.6 G/DL (ref 32–36)
MCV RBC AUTO: 77 FL (ref 82–98)
MONOCYTES # BLD AUTO: 1 K/UL (ref 0.3–1)
MONOCYTES NFR BLD: 8.8 % (ref 4–15)
NEUTROPHILS # BLD AUTO: 5.9 K/UL (ref 1.8–7.7)
NEUTROPHILS NFR BLD: 52.9 % (ref 38–73)
NRBC BLD-RTO: 0 /100 WBC
PLATELET # BLD AUTO: 400 K/UL (ref 150–450)
PMV BLD AUTO: 9.6 FL (ref 9.2–12.9)
POTASSIUM SERPL-SCNC: 3.6 MMOL/L (ref 3.5–5.1)
PROT SERPL-MCNC: 8.6 G/DL (ref 6–8.4)
RBC # BLD AUTO: 5.23 M/UL (ref 4.6–6.2)
SODIUM SERPL-SCNC: 138 MMOL/L (ref 136–145)
WBC # BLD AUTO: 11.1 K/UL (ref 3.9–12.7)

## 2024-03-25 PROCEDURE — 80053 COMPREHEN METABOLIC PANEL: CPT | Performed by: EMERGENCY MEDICINE

## 2024-03-25 PROCEDURE — 96361 HYDRATE IV INFUSION ADD-ON: CPT

## 2024-03-25 PROCEDURE — 96376 TX/PRO/DX INJ SAME DRUG ADON: CPT

## 2024-03-25 PROCEDURE — 96375 TX/PRO/DX INJ NEW DRUG ADDON: CPT

## 2024-03-25 PROCEDURE — 83690 ASSAY OF LIPASE: CPT | Performed by: EMERGENCY MEDICINE

## 2024-03-25 PROCEDURE — 63600175 PHARM REV CODE 636 W HCPCS: Performed by: EMERGENCY MEDICINE

## 2024-03-25 PROCEDURE — 25000003 PHARM REV CODE 250: Performed by: EMERGENCY MEDICINE

## 2024-03-25 PROCEDURE — 99284 EMERGENCY DEPT VISIT MOD MDM: CPT | Mod: 25

## 2024-03-25 PROCEDURE — 85025 COMPLETE CBC W/AUTO DIFF WBC: CPT | Performed by: EMERGENCY MEDICINE

## 2024-03-25 PROCEDURE — 96374 THER/PROPH/DIAG INJ IV PUSH: CPT

## 2024-03-25 RX ORDER — SUCRALFATE 1 G/10ML
1 SUSPENSION ORAL
Status: DISCONTINUED | OUTPATIENT
Start: 2024-03-25 | End: 2024-03-26 | Stop reason: HOSPADM

## 2024-03-25 RX ORDER — MORPHINE SULFATE 4 MG/ML
4 INJECTION, SOLUTION INTRAMUSCULAR; INTRAVENOUS
Status: COMPLETED | OUTPATIENT
Start: 2024-03-25 | End: 2024-03-25

## 2024-03-25 RX ORDER — MORPHINE SULFATE 2 MG/ML
6 INJECTION, SOLUTION INTRAMUSCULAR; INTRAVENOUS
Status: COMPLETED | OUTPATIENT
Start: 2024-03-25 | End: 2024-03-25

## 2024-03-25 RX ORDER — DROPERIDOL 2.5 MG/ML
1.25 INJECTION, SOLUTION INTRAMUSCULAR; INTRAVENOUS ONCE
Status: COMPLETED | OUTPATIENT
Start: 2024-03-25 | End: 2024-03-25

## 2024-03-25 RX ORDER — PROMETHAZINE HYDROCHLORIDE 25 MG/1
25 SUPPOSITORY RECTAL EVERY 6 HOURS PRN
Qty: 10 SUPPOSITORY | Refills: 0 | Status: SHIPPED | OUTPATIENT
Start: 2024-03-25 | End: 2024-05-01

## 2024-03-25 RX ADMIN — DROPERIDOL 1.25 MG: 2.5 INJECTION, SOLUTION INTRAMUSCULAR; INTRAVENOUS at 08:03

## 2024-03-25 RX ADMIN — MORPHINE SULFATE 6 MG: 2 INJECTION, SOLUTION INTRAMUSCULAR; INTRAVENOUS at 08:03

## 2024-03-25 RX ADMIN — MORPHINE SULFATE 4 MG: 4 INJECTION INTRAVENOUS at 09:03

## 2024-03-25 RX ADMIN — SODIUM CHLORIDE, POTASSIUM CHLORIDE, SODIUM LACTATE AND CALCIUM CHLORIDE 1000 ML: 600; 310; 30; 20 INJECTION, SOLUTION INTRAVENOUS at 08:03

## 2024-03-25 NOTE — FIRST PROVIDER EVALUATION
Medical screening examination initiated.  I have conducted a focused provider triage encounter, findings are as follows:    Brief history of present illness:  Feels like he is having a pancreatitis flair.  Had vomiting    There were no vitals filed for this visit.    Pertinent physical exam:  nontoxic    Brief workup plan:  labs ordered    Preliminary workup initiated; this workup will be continued and followed by the physician or advanced practice provider that is assigned to the patient when roomed.

## 2024-03-26 NOTE — DISCHARGE INSTRUCTIONS
Your labs today are reassuring.  Please follow-up with your primary doctor/GI doctor if your symptoms persist.

## 2024-03-26 NOTE — ED TRIAGE NOTES
Tasia Cardona, a 23 y.o. male presents to the ED w/ complaint of abdominal pain with vomiting, hx of pancreatitis.    Triage note:  Chief Complaint   Patient presents with    Abdominal Pain     Pancreatitis, vomiting     Review of patient's allergies indicates:   Allergen Reactions    [place] Swelling    Pecan nut Swelling    Penicillins Hives     Tolerates cephalosporins    Tolerated piperacillin/tazobactam 11/2023    Soy Other (See Comments)    Sulfa (sulfonamide antibiotics) Hives     Past Medical History:   Diagnosis Date    Acute necrotizing pancreatitis 09/20/2023    Alcohol use disorder 10/05/2023    Asthma     Hepatitis C antibody positive in blood 09/18/2023 9/2023 PCR negative    HIV infection 10/2021    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   Patient identifiers for Tasia Cardona checked and correct.    LOC: The patient is awake, alert and aware of environment with an appropriate affect, the patient is oriented x 4 and speaking appropriately.    APPEARANCE: Patient resting comfortably and in no acute distress, patient is clean and well groomed, patient's clothing is properly fastened.    SKIN: The skin is warm and dry, color consistent with ethnicity, patient has normal skin turgor and moist mucus membranes, skin intact, no breakdown or bruising noted.    MUSCULOSKELETAL: Patient moving all extremities well, no obvious swelling or deformities noted.    RESPIRATORY: Airway is open and patent, respirations are spontaneous and even, patient has a normal effort and rate.    CARDIAC: Patient has a normal rate and rhythm, no periphreal edema noted, capillary refill < 3 seconds.    ABDOMEN: Soft and non tender to palpation, no distention noted. Patient has some nausea, vomiting, but no diarrhea, or  constipation. Abdominal pain with hx of pancreatitis.    NEUROLOGIC: Eyes open spontaneously, PERRL, behavior appropriate to situation, follows commands, facial expression symmetrical, bilateral hand grasp equal and even, purposeful motor response noted, normal sensation in all extremities.     HEENT: No abnormalities noted. White sclera and pupils equal round and reactive to light. Denies headache, dizziness.     : Pt voids independently, denies dysuria, hematuria, frequency.

## 2024-03-26 NOTE — ED PROVIDER NOTES
Encounter Date: 3/25/2024       History     Chief Complaint   Patient presents with    Abdominal Pain     Pancreatitis, vomiting     Tasia Cardona is a 23-year-old male with a past medical history of pancreatitis, alcohol use disorder, asthma presenting today for abdominal pain.  Symptoms started yesterday.  He reports pain in the upper abdomen that radiates to the left upper quadrant.  It has been constant.  It is associated with nausea and multiple ( greater than 10 ) episodes of vomiting.  He says there is occasional streaks of blood in his vomit and reports black stool.   He has not been able to eat anything today.  He denies fevers, chills.  No sick contacts.      Review of patient's allergies indicates:   Allergen Reactions    [place] Swelling    Pecan nut Swelling    Penicillins Hives     Tolerates cephalosporins    Tolerated piperacillin/tazobactam 11/2023    Soy Other (See Comments)    Sulfa (sulfonamide antibiotics) Hives     Past Medical History:   Diagnosis Date    Acute necrotizing pancreatitis 09/20/2023    Alcohol use disorder 10/05/2023    Asthma     Hepatitis C antibody positive in blood 09/18/2023 9/2023 PCR negative    HIV infection 10/2021    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     Past Surgical History:   Procedure Laterality Date    ENDOSCOPIC ULTRASOUND OF UPPER GASTROINTESTINAL TRACT N/A 10/23/2023    Procedure: ULTRASOUND, UPPER GI TRACT, ENDOSCOPIC;  Surgeon: Emil Villatoro MD;  Location: [address]);  Service: Endoscopy;  Laterality: N/A;    ERCP N/A 10/23/2023    Procedure: ERCP (ENDOSCOPIC RETROGRADE CHOLANGIOPANCREATOGRAPHY);  Surgeon: Emil Villatoro MD;  Location: [facility] ([address] [place] [address]);  Service: Endoscopy;  Laterality: N/A;    ESOPHAGOGASTRODUODENOSCOPY N/A 9/18/2023     Procedure: EGD (ESOPHAGOGASTRODUODENOSCOPY);  Surgeon: Kg Cleaning MD;  Location: [facility] ([address] [place] [address]);  Service: Endoscopy;  Laterality: N/A;    ESOPHAGOGASTRODUODENOSCOPY N/A 3/8/2024    Procedure: EGD (ESOPHAGOGASTRODUODENOSCOPY);  Surgeon: Greg Love MD;  Location: [facility] ([address] [place] [address]);  Service: Endoscopy;  Laterality: N/A;     Family History   Problem Relation Age of Onset    Pancreatitis Mother     Cancer Mother     Ovarian cancer Mother     No Known Problems Father     Cancer Brother     Breast cancer Maternal Grandmother      Social History     Tobacco Use    Smoking status: Former     Types: Cigarettes    Smokeless tobacco: Never   Substance Use Topics    Alcohol use: Not Currently    Drug use: Never     Review of Systems    Physical Exam     Initial Vitals [03/25/24 1647]   BP Pulse Resp Temp SpO2   128/77 (!) 112 18 99.1 °F (37.3 °C) 99 %      MAP       --         Physical Exam    Nursing note and vitals reviewed.  Constitutional: He appears well-developed and well-nourished. No distress.   HENT:   Dry mucous membranes   Eyes: Conjunctivae are normal.   Neck: Neck supple.   Cardiovascular:  Regular rhythm and intact distal pulses.           Tachycardic   Pulmonary/Chest: Breath sounds normal. He has no wheezes. He has no rales.   Abdominal: Abdomen is soft. Bowel sounds are normal. There is abdominal tenderness (Upper abdominal tenderness). There is no rebound and no guarding.   Genitourinary:    Genitourinary Comments: Rectal exam performed with chaperone present, mucus without blood or stool.  No external hemorrhoid     Musculoskeletal:         General: No edema.      Cervical back: Neck supple.     Lymphadenopathy:     He has no cervical adenopathy.   Neurological: He is alert and oriented to person, place, and time.   Mild tremor   Skin: No rash noted.   Psychiatric: He has a normal mood and affect.         ED Course   Procedures  Labs Reviewed   CBC W/ AUTO DIFFERENTIAL - Abnormal;  Notable for the following components:       Result Value    Hemoglobin 11.9 (*)     MCV 77 (*)     MCH 22.8 (*)     MCHC 29.6 (*)     RDW 18.4 (*)     All other components within normal limits   COMPREHENSIVE METABOLIC PANEL - Abnormal; Notable for the following components:    CO2 22 (*)     Total Protein 8.6 (*)     All other components within normal limits   LIPASE          Imaging Results    None          Medications   droPERidol injection 1.25 mg (1.25 mg Intravenous Given 3/25/24 2009)   lactated ringers bolus 1,000 mL (0 mLs Intravenous Stopped 3/25/24 2203)   morphine injection 6 mg (6 mg Intravenous Given 3/25/24 2009)   morphine injection 4 mg (4 mg Intravenous Given 3/25/24 2158)     Medical Decision Making  Emergent evaluation a 23-year-old male presenting today for upper abdominal pain with nausea and vomiting in the setting history of pancreatitis.    He is afebrile, tachycardic otherwise vital signs are stable.  He looks dry on exam with a mild tremor otherwise abdomen soft mild upper abdominal tenderness with no rebound or guarding.    Differential includes but not limited to pancreatitis, upper GI bleed, electrolyte derangement, dehydration, anemia    Plan for labs, droperidol, IV fluids, morphine    Amount and/or Complexity of Data Reviewed  Labs: ordered. Decision-making details documented in ED Course.    Risk  Prescription drug management.               ED Course as of 03/26/24 1918   Mon Mar 25, 2024   2058 WBC: 11.10 [GM]   2058 Hemoglobin(!): 11.9 [GM]   2058 Lipase: 18 [GM]   2058 BUN: 9 [GM]   2058 Creatinine: 0.7 [GM]      ED Course User Index  [GM] Bambi Duque MD          Labs stable.  Patient able to tolerate PO.  Recommend outpatient follow up                  Clinical Impression:  Final diagnoses:  [R10.13] Epigastric abdominal pain (Primary)          ED Disposition Condition    Discharge Stable          ED Prescriptions       Medication Sig Dispense Start Date End Date Auth.  Provider    promethazine (PHENERGAN) 25 MG suppository Place 1 suppository (25 mg total) rectally every 6 (six) hours as needed for Nausea. 10 suppository 3/25/2024 -- Bambi Duque MD          Follow-up Information       Follow up With Specialties Details Why Contact Info    Pina Jones NP Family Medicine Schedule an appointment as soon as possible for a visit   [address] [facility] [address]  [phone]               Bambi Duque MD  03/26/24 2014

## 2024-04-25 ENCOUNTER — PATIENT MESSAGE (OUTPATIENT)
Dept: INFECTIOUS DISEASES | Facility: CLINIC | Age: 24
End: 2024-04-25
Payer: MEDICAID

## 2024-04-25 ENCOUNTER — TELEPHONE (OUTPATIENT)
Dept: INFECTIOUS DISEASES | Facility: CLINIC | Age: 24
End: 2024-04-25
Payer: MEDICAID

## 2024-04-25 DIAGNOSIS — Z21 ASYMPTOMATIC HIV INFECTION, WITH NO HISTORY OF HIV-RELATED ILLNESS: Primary | ICD-10-CM

## 2024-04-25 RX ORDER — BICTEGRAVIR SODIUM, EMTRICITABINE, AND TENOFOVIR ALAFENAMIDE FUMARATE 50; 200; 25 MG/1; MG/1; MG/1
1 TABLET ORAL DAILY
Qty: 30 TABLET | Refills: 2 | Status: ACTIVE | OUTPATIENT
Start: 2024-04-25 | End: 2024-07-24

## 2024-04-25 NOTE — TELEPHONE ENCOUNTER
Notify patient that refill is being sent to his pharmacy.  Please arrange a follow up visit.  Patient saw Dr. Dawson in 2022 but saw Dr. Taylor last in 2023.   They currently don't have a physician.  Arrange follow up with either Dr. Callahan.

## 2024-05-03 ENCOUNTER — TELEPHONE (OUTPATIENT)
Dept: ENDOSCOPY | Facility: HOSPITAL | Age: 24
End: 2024-05-03
Payer: MEDICAID

## 2024-05-03 DIAGNOSIS — K20.90 ESOPHAGITIS: Primary | ICD-10-CM

## 2024-05-03 NOTE — TELEPHONE ENCOUNTER
"----- Message from Arabella Colorado sent at 3/15/2024  8:36 AM CDT -----  Regarding: FW: Repeat EGD    ----- Message -----  From: Tony Matos MD  Sent: 3/15/2024  12:00 AM CDT  To: [facility]  Subject: Repeat EGD                                       Procedure: EGD    Diagnosis: Esophagitis    Procedure Timin weeks    #If within 4 weeks selected, please kai as high priority#    #If greater than 12 weeks, please select "5-12 weeks" and delay sending until 3 months prior to requested date#     Provider: Any GI provider    Location: [address]    Additional Scheduling Information: Pancreatitis    Prep Specifications:Gastroparesis (Extended clear liquid)    Is the patient taking a GLP-1 Agonist:no    Have you attached a patient to this message: yes       "

## 2024-05-09 ENCOUNTER — TELEPHONE (OUTPATIENT)
Dept: ENDOSCOPY | Facility: HOSPITAL | Age: 24
End: 2024-05-09
Payer: MEDICAID

## 2024-05-09 NOTE — TELEPHONE ENCOUNTER
Spoke to patient to schedule procedure(s) Upper Endoscopy (EGD)       Physician to perform procedure(s) Dr. BENTLEY Villatoro  Date of Procedure (s) 7/26/24  Arrival Time 9:30 AM  Time of Procedure(s) 10:30 AM   Location of Procedure(s) [address]  Type of Rx Prep sent to patient: N/A  Instructions provided to patient via [facility]    Patient was informed on the following information and verbalized understanding. Screening questionnaire reviewed with patient and complete. If procedure requires anesthesia, a responsible adult needs to be present to accompany the patient home, patient cannot drive after receiving anesthesia. Appointment details are tentative, especially check-in time. Patient will receive a prep-op call 7 days prior to confirm check-in time for procedure. If applicable the patient should contact their pharmacy to verify Rx for procedure prep is ready for pick-up. Patient was advised to call the scheduling department at [phone] if pharmacy states no Rx is available. Patient was advised to call the endoscopy scheduling department if any questions or concerns arise.      [facility] Scheduling Department

## 2024-05-12 ENCOUNTER — HOSPITAL ENCOUNTER (EMERGENCY)
Facility: HOSPITAL | Age: 24
Discharge: HOME | End: 2024-05-13
Attending: EMERGENCY MEDICINE
Payer: MEDICAID

## 2024-05-12 VITALS
HEIGHT: 70 IN | DIASTOLIC BLOOD PRESSURE: 76 MMHG | RESPIRATION RATE: 18 BRPM | OXYGEN SATURATION: 100 % | SYSTOLIC BLOOD PRESSURE: 129 MMHG | WEIGHT: 140 LBS | TEMPERATURE: 98 F | HEART RATE: 83 BPM | BODY MASS INDEX: 20.04 KG/M2

## 2024-05-12 DIAGNOSIS — R10.13 EPIGASTRIC PAIN: ICD-10-CM

## 2024-05-12 DIAGNOSIS — R11.2 NAUSEA AND VOMITING, UNSPECIFIED VOMITING TYPE: Primary | ICD-10-CM

## 2024-05-12 LAB
ALBUMIN SERPL BCP-MCNC: 3.7 G/DL (ref 3.5–5.2)
ALP SERPL-CCNC: 134 U/L (ref 55–135)
ALT SERPL W/O P-5'-P-CCNC: 19 U/L (ref 10–44)
ANION GAP SERPL CALC-SCNC: 13 MMOL/L (ref 8–16)
AST SERPL-CCNC: 38 U/L (ref 10–40)
BASOPHILS # BLD AUTO: 0.05 K/UL (ref 0–0.2)
BASOPHILS NFR BLD: 0.8 % (ref 0–1.9)
BILIRUB SERPL-MCNC: 0.5 MG/DL (ref 0.1–1)
BUN SERPL-MCNC: 5 MG/DL (ref 6–20)
CALCIUM SERPL-MCNC: 8.8 MG/DL (ref 8.7–10.5)
CHLORIDE SERPL-SCNC: 110 MMOL/L (ref 95–110)
CO2 SERPL-SCNC: 19 MMOL/L (ref 23–29)
CREAT SERPL-MCNC: 0.8 MG/DL (ref 0.5–1.4)
DIFFERENTIAL METHOD BLD: ABNORMAL
EOSINOPHIL # BLD AUTO: 0.1 K/UL (ref 0–0.5)
EOSINOPHIL NFR BLD: 2.2 % (ref 0–8)
ERYTHROCYTE [DISTWIDTH] IN BLOOD BY AUTOMATED COUNT: 20.9 % (ref 11.5–14.5)
EST. GFR  (NO RACE VARIABLE): >60 ML/MIN/1.73 M^2
GLUCOSE SERPL-MCNC: 104 MG/DL (ref 70–110)
HCT VFR BLD AUTO: 39.4 % (ref 40–54)
HGB BLD-MCNC: 12 G/DL (ref 14–18)
IMM GRANULOCYTES # BLD AUTO: 0.01 K/UL (ref 0–0.04)
IMM GRANULOCYTES NFR BLD AUTO: 0.2 % (ref 0–0.5)
LIPASE SERPL-CCNC: 25 U/L (ref 4–60)
LYMPHOCYTES # BLD AUTO: 3 K/UL (ref 1–4.8)
LYMPHOCYTES NFR BLD: 50 % (ref 18–48)
MCH RBC QN AUTO: 24.8 PG (ref 27–31)
MCHC RBC AUTO-ENTMCNC: 30.5 G/DL (ref 32–36)
MCV RBC AUTO: 82 FL (ref 82–98)
MONOCYTES # BLD AUTO: 0.5 K/UL (ref 0.3–1)
MONOCYTES NFR BLD: 7.9 % (ref 4–15)
NEUTROPHILS # BLD AUTO: 2.3 K/UL (ref 1.8–7.7)
NEUTROPHILS NFR BLD: 38.9 % (ref 38–73)
NRBC BLD-RTO: 0 /100 WBC
PLATELET # BLD AUTO: 310 K/UL (ref 150–450)
PMV BLD AUTO: 9.3 FL (ref 9.2–12.9)
POTASSIUM SERPL-SCNC: 3.5 MMOL/L (ref 3.5–5.1)
PROT SERPL-MCNC: 7.9 G/DL (ref 6–8.4)
RBC # BLD AUTO: 4.83 M/UL (ref 4.6–6.2)
SODIUM SERPL-SCNC: 142 MMOL/L (ref 136–145)
WBC # BLD AUTO: 5.92 K/UL (ref 3.9–12.7)

## 2024-05-12 PROCEDURE — 96376 TX/PRO/DX INJ SAME DRUG ADON: CPT

## 2024-05-12 PROCEDURE — 25000003 PHARM REV CODE 250: Performed by: EMERGENCY MEDICINE

## 2024-05-12 PROCEDURE — 93005 ELECTROCARDIOGRAM TRACING: CPT

## 2024-05-12 PROCEDURE — 83690 ASSAY OF LIPASE: CPT | Performed by: EMERGENCY MEDICINE

## 2024-05-12 PROCEDURE — 93010 ELECTROCARDIOGRAM REPORT: CPT | Mod: ,,, | Performed by: INTERNAL MEDICINE

## 2024-05-12 PROCEDURE — 85025 COMPLETE CBC W/AUTO DIFF WBC: CPT | Performed by: EMERGENCY MEDICINE

## 2024-05-12 PROCEDURE — 80053 COMPREHEN METABOLIC PANEL: CPT | Performed by: EMERGENCY MEDICINE

## 2024-05-12 PROCEDURE — 96375 TX/PRO/DX INJ NEW DRUG ADDON: CPT

## 2024-05-12 PROCEDURE — 99284 EMERGENCY DEPT VISIT MOD MDM: CPT | Mod: 25

## 2024-05-12 PROCEDURE — 96374 THER/PROPH/DIAG INJ IV PUSH: CPT

## 2024-05-12 PROCEDURE — 96361 HYDRATE IV INFUSION ADD-ON: CPT

## 2024-05-12 PROCEDURE — 63600175 PHARM REV CODE 636 W HCPCS: Performed by: EMERGENCY MEDICINE

## 2024-05-12 RX ORDER — MORPHINE SULFATE 4 MG/ML
4 INJECTION, SOLUTION INTRAMUSCULAR; INTRAVENOUS
Status: COMPLETED | OUTPATIENT
Start: 2024-05-12 | End: 2024-05-12

## 2024-05-12 RX ORDER — ACETAMINOPHEN 500 MG
1000 TABLET ORAL
Status: COMPLETED | OUTPATIENT
Start: 2024-05-12 | End: 2024-05-12

## 2024-05-12 RX ORDER — PROCHLORPERAZINE EDISYLATE 5 MG/ML
5 INJECTION INTRAMUSCULAR; INTRAVENOUS
Status: COMPLETED | OUTPATIENT
Start: 2024-05-12 | End: 2024-05-12

## 2024-05-12 RX ORDER — ALUMINUM HYDROXIDE, MAGNESIUM HYDROXIDE, AND SIMETHICONE 1200; 120; 1200 MG/30ML; MG/30ML; MG/30ML
30 SUSPENSION ORAL
Status: DISCONTINUED | OUTPATIENT
Start: 2024-05-12 | End: 2024-05-12

## 2024-05-12 RX ORDER — KETOROLAC TROMETHAMINE 30 MG/ML
10 INJECTION, SOLUTION INTRAMUSCULAR; INTRAVENOUS
Status: COMPLETED | OUTPATIENT
Start: 2024-05-12 | End: 2024-05-12

## 2024-05-12 RX ADMIN — KETOROLAC TROMETHAMINE 10 MG: 30 INJECTION, SOLUTION INTRAMUSCULAR; INTRAVENOUS at 10:05

## 2024-05-12 RX ADMIN — ACETAMINOPHEN 1000 MG: 500 TABLET ORAL at 10:05

## 2024-05-12 RX ADMIN — MORPHINE SULFATE 4 MG: 4 INJECTION INTRAVENOUS at 08:05

## 2024-05-12 RX ADMIN — MORPHINE SULFATE 4 MG: 4 INJECTION INTRAVENOUS at 09:05

## 2024-05-12 RX ADMIN — SODIUM CHLORIDE 1000 ML: 0.9 INJECTION, SOLUTION INTRAVENOUS at 08:05

## 2024-05-12 RX ADMIN — PROCHLORPERAZINE EDISYLATE 5 MG: 5 INJECTION INTRAMUSCULAR; INTRAVENOUS at 08:05

## 2024-05-13 LAB
OHS QRS DURATION: 84 MS
OHS QTC CALCULATION: 432 MS

## 2024-05-13 RX ORDER — PANTOPRAZOLE SODIUM 40 MG/1
40 TABLET, DELAYED RELEASE ORAL DAILY
Qty: 30 TABLET | Refills: 0 | Status: SHIPPED | OUTPATIENT
Start: 2024-05-13 | End: 2024-06-12

## 2024-05-13 NOTE — ED PROVIDER NOTES
Encounter Date: 5/12/2024       History     Chief Complaint   Patient presents with    Abdominal Pain     Left lower; hx of pancreatitis ; for 4 days    Vomiting     With nausea     HPI  24-year-old male with past medical history as noted below, multiple presentations for chronic pancreatitis, coming in for upper abdominal pain for the last 4 days as well as multiple episodes of vomiting up to 5 times a day with nausea.  He has been trying to take promethazine at home with no significant improvements in his symptoms.  He feels like it is consistent with previous episodes of pancreatitis.  He states he has not had alcohol since September last year.    Pain is mainly epigastric and left upper quadrant.  No fevers but having some subjective chills vomiting is blood-tinged, patient states this has happened to him before when he vomits multiple times.  Also with some accompanying diarrhea that is nonbloody.  No lower abdominal pain, no testicular penile pain.  No dysuria.  No chest pain or shortness of breath.      He is unsure what is recent CD4 count is, is on antiretrovirals.     Review of patient's allergies indicates:   Allergen Reactions    [place] Swelling    Pecan nut Swelling    Penicillins Hives     Tolerates cephalosporins    Tolerated piperacillin/tazobactam 11/2023    Soy Other (See Comments)    Sulfa (sulfonamide antibiotics) Hives     Past Medical History:   Diagnosis Date    Acute necrotizing pancreatitis 09/20/2023    Alcohol use disorder 10/05/2023    Asthma     Hepatitis C antibody positive in blood 09/18/2023 9/2023 PCR negative    HIV infection 10/2021    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     Past Surgical History:   Procedure Laterality Date    ENDOSCOPIC ULTRASOUND OF UPPER  GASTROINTESTINAL TRACT N/A 10/23/2023    Procedure: ULTRASOUND, UPPER GI TRACT, ENDOSCOPIC;  Surgeon: Emil Villatoro MD;  Location: [facility] ENDO (2ND FLR);  Service: Endoscopy;  Laterality: N/A;    ERCP N/A 10/23/2023    Procedure: ERCP (ENDOSCOPIC RETROGRADE CHOLANGIOPANCREATOGRAPHY);  Surgeon: Emil Villatoro MD;  Location: [facility] ENDO (2ND FLR);  Service: Endoscopy;  Laterality: N/A;    ESOPHAGOGASTRODUODENOSCOPY N/A 9/18/2023    Procedure: EGD (ESOPHAGOGASTRODUODENOSCOPY);  Surgeon: Kg Cleaning MD;  Location: [facility] ENDO (2ND FLR);  Service: Endoscopy;  Laterality: N/A;    ESOPHAGOGASTRODUODENOSCOPY N/A 3/8/2024    Procedure: EGD (ESOPHAGOGASTRODUODENOSCOPY);  Surgeon: Greg Love MD;  Location: [facility] ENDO (2ND FLR);  Service: Endoscopy;  Laterality: N/A;     Family History   Problem Relation Name Age of Onset    Pancreatitis Mother      Cancer Mother      Ovarian cancer Mother      No Known Problems Father      Cancer Brother      Breast cancer Maternal Grandmother       Social History     Tobacco Use    Smoking status: Former     Types: Cigarettes    Smokeless tobacco: Never   Substance Use Topics    Alcohol use: Not Currently    Drug use: Never     Review of Systems    Physical Exam     Initial Vitals [05/12/24 1903]   BP Pulse Resp Temp SpO2   125/82 (!) 119 20 98.1 °F (36.7 °C) 97 %      MAP       --         Physical Exam    Physical Exam:  CONSTITUTIONAL: Well developed, well nourished, in no acute distress.  HENT: Normocephalic, atraumatic    EYES: Sclerae anicteric   NECK: Supple, no thyroid enlargement  CARDIOVASCULAR: Regular rate and rhythm without any murmurs, gallops, rubs.  RESPIRATORY: Speaking in full sentences. Breathing comfortably. Auscultation of the lungs revealed normal breath sounds b/l, no wheezing, no rales, no rhonchi.  ABDOMEN: Soft, there is some mild tenderness to palpation in the epigastric area and left upper quadrant, lower quadrants bilaterally and right-sided abdomen upper  and lower are entirely benign, no masses, no rebound or guarding   NEUROLOGIC: Alert, interacting normally. No facial droop.   MSK: Moving all four extremities.  Skin: Warm and dry. No visible rash on exposed areas of skin.    Psych: Mood and affect normal.       ED Course   Procedures  Labs Reviewed   CBC W/ AUTO DIFFERENTIAL - Abnormal; Notable for the following components:       Result Value    Hemoglobin 12.0 (*)     Hematocrit 39.4 (*)     MCH 24.8 (*)     MCHC 30.5 (*)     RDW 20.9 (*)     Lymph % 50.0 (*)     All other components within normal limits   COMPREHENSIVE METABOLIC PANEL - Abnormal; Notable for the following components:    CO2 19 (*)     BUN 5 (*)     All other components within normal limits   LIPASE          Imaging Results    None          Medications   sodium chloride 0.9% bolus 1,000 mL 1,000 mL (0 mLs Intravenous Stopped 5/12/24 2235)   morphine injection 4 mg (4 mg Intravenous Given 5/12/24 2034)   prochlorperazine injection Soln 5 mg (5 mg Intravenous Given 5/12/24 2035)   morphine injection 4 mg (4 mg Intravenous Given 5/12/24 2142)   ketorolac injection 9.999 mg (9.999 mg Intravenous Given 5/12/24 2214)   acetaminophen tablet 1,000 mg (1,000 mg Oral Given 5/12/24 2213)     Medical Decision Making  Amount and/or Complexity of Data Reviewed  External Data Reviewed: radiology and notes.     Details: Notes from previous admissions as well as CT scans.    CT scan from 03/04:  1. Sequela pancreatitis with multiple fluid collections in the upper abdomen, some of which are new/worse and some which have improved from prior study.  New thin walled collection adjacent to the stomach measures up to 4 cm in size.  2. New/worse significant lower esophageal wall thickening.  Suggest correlation for any clinical signs of esophagitis and/or recent vomiting.  3. 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  4. Hepatosplenomegaly and probable hepatic steatosis.  5.  Additional findings as detailed above.    Labs: ordered.    Risk  OTC drugs.  Prescription drug management.  Decision regarding hospitalization.      24-year-old male with past history as noted coming in with vomiting, upper abdominal pain in the setting of known chronic pancreatitis, known fluid collections in his upper abdomen on CT scan 2 month ago.  AS consult this time no interventions indicated.    History and exam is consistent with continued/recurrent chronic pancreatitis.    Will obtain labs to look for any metabolic hematologic abnormalities, symptomatic treatment with IV fluids, morphine, Compazine.    He is isolated tenderness in his upper abdomen, well-appearing, no guarding, he had a CT scan 2 month ago as noted above, this time no indication for repeat imaging.  Will reassess after medications and labs.  Anticipate likely discharge home if symptoms are improved.    Update:  Labs are unremarkable.    Patient re-evaluated after IV fluids, Compazine, morphine.  He says he continues to have abdominal pain.  Given additional dose of morphine.  Will also treat with Tylenol and Toradol.    Signed out to night team pending re-evaluation.  You continues to have significant pain may need repeat imaging although he has had 18 CT abdomen pelvis is in 2023, and 2 this year.  Discussed with patient he says he rather avoid a repeat CT in and also rather not be admitted.  Signed out to night team who will reassess patient after above medications to see if his pain is controlled and, if so he can likely go home for outpatient treatment, and continued outpatient evaluation.  Pain is significant or not well controlled, repeat imaging may be indicated, although I have a suspicion it will be very low yield given the amount of imaging he has had recently, in the fact that previous visits no interventions were undertaken dressing these fluid collections.                                    Clinical Impression:  Final  diagnoses:  [R11.2] Nausea and vomiting, unspecified vomiting type (Primary)  [R10.13] Epigastric pain          ED Disposition Condition    Discharge Stable          ED Prescriptions       Medication Sig Dispense Start Date End Date Auth. Provider    pantoprazole (PROTONIX) 40 MG tablet Take 1 tablet (40 mg total) by mouth once daily. 30 tablet 5/13/2024 6/12/2024 Gem Dumas MD          Follow-up Information       Follow up With Specialties Details Why Contact Info    Pina Jones, NP Family Medicine Schedule an appointment as soon as possible for a visit   [address] [facility] [address]  [phone]      [facility] - Emergency Dept Emergency Medicine  As needed, If symptoms worsen [address] [facility] [address] [phone]             Yang Matos MD  05/13/24 4437

## 2024-05-13 NOTE — PROGRESS NOTES
Patient is a 24-year-old male with history of pancreatitis, previous alcohol use, asthma who presents for upper abdominal discomfort.  Pain started 4 days ago.  He has been having associated nausea and vomiting.  He has been taking promethazine at home with minimal improvement.  Symptoms feel consistent with his previous pancreatitis flares.  He was not drank alcohol since September of last year.  No fever.  Reports some loose stools that are nonbloody.  No testicular penile pain.  No urinary symptoms.  No chest pain or shortness of breath.  Abdominal exam reassuring.  He has tenderness in the epigastrium and left upper quadrant without any rebound or guarding.  Overall, patient looks clinically well.  Labs reviewed and reassuring.  Plan to re-evaluate the patient after symptomatic treatment.  If improved, plan for discharge home.  If still having symptoms, plan for CT scan min additional treatment and possible admission.    On re-evaluation, patient resting comfortably.  He states that his symptoms have significantly improved and he was requesting discharge home.  Repeat abdominal exam is benign.  He has no tenderness.  Vitals reassuring.  No recurrent vomiting here in the emergency department and he was tolerating p.o..  Will go ahead and start him on Protonix.  Patient already has promethazine at home for nausea and vomiting.  He was given very strict return precautions prior to discharge and advised to follow up with both his gastroenterologist and primary care doctor.

## 2024-05-13 NOTE — ED TRIAGE NOTES
Tasia Cardona, a 24 y.o. male presents to the ED w/ complaint of abdominal pain with nausea/vomiting. HX pancreatitis     Triage note:  Chief Complaint   Patient presents with    Abdominal Pain     Left lower; hx of pancreatitis ; for 4 days    Vomiting     With nausea     Review of patient's allergies indicates:   Allergen Reactions    [place] Swelling    Pecan nut Swelling    Penicillins Hives     Tolerates cephalosporins    Tolerated piperacillin/tazobactam 11/2023    Soy Other (See Comments)    Sulfa (sulfonamide antibiotics) Hives     Past Medical History:   Diagnosis Date    Acute necrotizing pancreatitis 09/20/2023    Alcohol use disorder 10/05/2023    Asthma     Hepatitis C antibody positive in blood 09/18/2023 9/2023 PCR negative    HIV infection 10/2021    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   Patient identifiers for Tasia Cardona checked and correct.    LOC: The patient is awake, alert and aware of environment with an appropriate affect, the patient is oriented x 4 and speaking appropriately.    APPEARANCE: Patient resting comfortably and in no acute distress, patient is clean and well groomed, patient's clothing is properly fastened.    SKIN: The skin is warm and dry, color consistent with ethnicity, patient has normal skin turgor and moist mucus membranes, skin intact, no breakdown or bruising noted.    MUSCULOSKELETAL: Patient moving all extremities well, no obvious swelling or deformities noted.    RESPIRATORY: Airway is open and patent, respirations are spontaneous and even, patient has a normal effort and rate.    CARDIAC: Patient has a normal rate and rhythm, no periphreal edema noted, capillary refill < 3 seconds.    ABDOMEN: Soft and non tender to palpation, no distention noted. Patient  reports upper abd pain with n/v.     NEUROLOGIC: Eyes open spontaneously, PERRL, behavior appropriate to situation, follows commands, facial expression symmetrical, bilateral hand grasp equal and even, purposeful motor response noted, normal sensation in all extremities.     HEENT: No abnormalities noted. White sclera and pupils equal round and reactive to light. Denies headache, dizziness.     : Pt voids independently, denies dysuria, hematuria, frequency.

## 2024-05-13 NOTE — DISCHARGE INSTRUCTIONS
You were seen today for epigastric abdominal pain and nausea vomiting.  Please continue taking your home medicines for nausea.  You can also start taking the Protonix prescribed to you today for possible gastritis.  Please follow-up with your primary care doctor within the next week.  You should also follow up with your gastroenterologist as soon as possible.  If you redevelop any abdominal pain, nausea vomiting that is not improved with any of your medications, fever or any other concerns, you should return to the emergency department for re-evaluation.

## 2024-05-22 ENCOUNTER — TELEPHONE (OUTPATIENT)
Dept: ENDOSCOPY | Facility: HOSPITAL | Age: 24
End: 2024-05-22
Payer: MEDICAID

## 2024-05-22 ENCOUNTER — HOSPITAL ENCOUNTER (EMERGENCY)
Facility: HOSPITAL | Age: 24
Discharge: HOME | End: 2024-05-23
Attending: STUDENT IN AN ORGANIZED HEALTH CARE EDUCATION/TRAINING PROGRAM
Payer: MEDICAID

## 2024-05-22 DIAGNOSIS — K85.90 ACUTE PANCREATITIS WITHOUT INFECTION OR NECROSIS, UNSPECIFIED PANCREATITIS TYPE: Primary | ICD-10-CM

## 2024-05-22 DIAGNOSIS — R10.9 ABDOMINAL PAIN, UNSPECIFIED ABDOMINAL LOCATION: Primary | ICD-10-CM

## 2024-05-22 DIAGNOSIS — Z87.19 HISTORY OF PANCREATITIS: ICD-10-CM

## 2024-05-22 DIAGNOSIS — R11.2 NAUSEA AND VOMITING, UNSPECIFIED VOMITING TYPE: ICD-10-CM

## 2024-05-22 LAB
ALBUMIN SERPL BCP-MCNC: 3.6 G/DL (ref 3.5–5.2)
ALP SERPL-CCNC: 120 U/L (ref 55–135)
ALT SERPL W/O P-5'-P-CCNC: 11 U/L (ref 10–44)
ANION GAP SERPL CALC-SCNC: 11 MMOL/L (ref 8–16)
AST SERPL-CCNC: 19 U/L (ref 10–40)
BASOPHILS # BLD AUTO: 0.06 K/UL (ref 0–0.2)
BASOPHILS NFR BLD: 1 % (ref 0–1.9)
BILIRUB SERPL-MCNC: 0.2 MG/DL (ref 0.1–1)
BUN SERPL-MCNC: 5 MG/DL (ref 6–30)
BUN SERPL-MCNC: 6 MG/DL (ref 6–20)
CALCIUM SERPL-MCNC: 8.6 MG/DL (ref 8.7–10.5)
CHLORIDE SERPL-SCNC: 107 MMOL/L (ref 95–110)
CHLORIDE SERPL-SCNC: 108 MMOL/L (ref 95–110)
CO2 SERPL-SCNC: 20 MMOL/L (ref 23–29)
CREAT SERPL-MCNC: 0.9 MG/DL (ref 0.5–1.4)
CREAT SERPL-MCNC: 1 MG/DL (ref 0.5–1.4)
DIFFERENTIAL METHOD BLD: ABNORMAL
EOSINOPHIL # BLD AUTO: 0.4 K/UL (ref 0–0.5)
EOSINOPHIL NFR BLD: 5.7 % (ref 0–8)
ERYTHROCYTE [DISTWIDTH] IN BLOOD BY AUTOMATED COUNT: 21.2 % (ref 11.5–14.5)
EST. GFR  (NO RACE VARIABLE): >60 ML/MIN/1.73 M^2
GLUCOSE SERPL-MCNC: 94 MG/DL (ref 70–110)
GLUCOSE SERPL-MCNC: 99 MG/DL (ref 70–110)
HCT VFR BLD AUTO: 40.1 % (ref 40–54)
HCT VFR BLD CALC: 40 %PCV (ref 36–54)
HGB BLD-MCNC: 11.9 G/DL (ref 14–18)
IMM GRANULOCYTES # BLD AUTO: 0.01 K/UL (ref 0–0.04)
IMM GRANULOCYTES NFR BLD AUTO: 0.2 % (ref 0–0.5)
LIPASE SERPL-CCNC: 54 U/L (ref 4–60)
LYMPHOCYTES # BLD AUTO: 3.3 K/UL (ref 1–4.8)
LYMPHOCYTES NFR BLD: 53.5 % (ref 18–48)
MCH RBC QN AUTO: 25 PG (ref 27–31)
MCHC RBC AUTO-ENTMCNC: 29.7 G/DL (ref 32–36)
MCV RBC AUTO: 84 FL (ref 82–98)
MONOCYTES # BLD AUTO: 0.4 K/UL (ref 0.3–1)
MONOCYTES NFR BLD: 6.8 % (ref 4–15)
NEUTROPHILS # BLD AUTO: 2 K/UL (ref 1.8–7.7)
NEUTROPHILS NFR BLD: 32.8 % (ref 38–73)
NRBC BLD-RTO: 0 /100 WBC
PLATELET # BLD AUTO: 284 K/UL (ref 150–450)
PMV BLD AUTO: 9.8 FL (ref 9.2–12.9)
POC IONIZED CALCIUM: 1.1 MMOL/L (ref 1.06–1.42)
POC TCO2 (MEASURED): 22 MMOL/L (ref 23–29)
POTASSIUM BLD-SCNC: 3.7 MMOL/L (ref 3.5–5.1)
POTASSIUM SERPL-SCNC: 3.8 MMOL/L (ref 3.5–5.1)
PROT SERPL-MCNC: 7.7 G/DL (ref 6–8.4)
RBC # BLD AUTO: 4.76 M/UL (ref 4.6–6.2)
SAMPLE: ABNORMAL
SODIUM BLD-SCNC: 139 MMOL/L (ref 136–145)
SODIUM SERPL-SCNC: 139 MMOL/L (ref 136–145)
WBC # BLD AUTO: 6.17 K/UL (ref 3.9–12.7)

## 2024-05-22 PROCEDURE — 80053 COMPREHEN METABOLIC PANEL: CPT | Performed by: NURSE PRACTITIONER

## 2024-05-22 PROCEDURE — 63600175 PHARM REV CODE 636 W HCPCS: Performed by: PHYSICIAN ASSISTANT

## 2024-05-22 PROCEDURE — 25000003 PHARM REV CODE 250: Performed by: NURSE PRACTITIONER

## 2024-05-22 PROCEDURE — 83690 ASSAY OF LIPASE: CPT | Performed by: NURSE PRACTITIONER

## 2024-05-22 PROCEDURE — 96361 HYDRATE IV INFUSION ADD-ON: CPT

## 2024-05-22 PROCEDURE — 63600175 PHARM REV CODE 636 W HCPCS: Performed by: NURSE PRACTITIONER

## 2024-05-22 PROCEDURE — 25000003 PHARM REV CODE 250: Performed by: PHYSICIAN ASSISTANT

## 2024-05-22 PROCEDURE — 96375 TX/PRO/DX INJ NEW DRUG ADDON: CPT

## 2024-05-22 PROCEDURE — 99284 EMERGENCY DEPT VISIT MOD MDM: CPT

## 2024-05-22 PROCEDURE — 96374 THER/PROPH/DIAG INJ IV PUSH: CPT

## 2024-05-22 PROCEDURE — 85025 COMPLETE CBC W/AUTO DIFF WBC: CPT | Performed by: NURSE PRACTITIONER

## 2024-05-22 RX ORDER — DICYCLOMINE HYDROCHLORIDE 10 MG/1
20 CAPSULE ORAL
Status: COMPLETED | OUTPATIENT
Start: 2024-05-22 | End: 2024-05-22

## 2024-05-22 RX ORDER — SUCRALFATE 1 G/10ML
1 SUSPENSION ORAL
Status: DISCONTINUED | OUTPATIENT
Start: 2024-05-22 | End: 2024-05-23 | Stop reason: HOSPADM

## 2024-05-22 RX ORDER — DROPERIDOL 2.5 MG/ML
1.25 INJECTION, SOLUTION INTRAMUSCULAR; INTRAVENOUS
Status: COMPLETED | OUTPATIENT
Start: 2024-05-22 | End: 2024-05-22

## 2024-05-22 RX ORDER — ONDANSETRON 4 MG/1
4 TABLET, ORALLY DISINTEGRATING ORAL
Status: COMPLETED | OUTPATIENT
Start: 2024-05-22 | End: 2024-05-22

## 2024-05-22 RX ORDER — KETOROLAC TROMETHAMINE 30 MG/ML
10 INJECTION, SOLUTION INTRAMUSCULAR; INTRAVENOUS
Status: COMPLETED | OUTPATIENT
Start: 2024-05-22 | End: 2024-05-22

## 2024-05-22 RX ADMIN — SODIUM CHLORIDE 1000 ML: 9 INJECTION, SOLUTION INTRAVENOUS at 10:05

## 2024-05-22 RX ADMIN — ONDANSETRON 4 MG: 4 TABLET, ORALLY DISINTEGRATING ORAL at 09:05

## 2024-05-22 RX ADMIN — SODIUM CHLORIDE 1000 ML: 9 INJECTION, SOLUTION INTRAVENOUS at 09:05

## 2024-05-22 RX ADMIN — KETOROLAC TROMETHAMINE 10 MG: 30 INJECTION, SOLUTION INTRAMUSCULAR at 09:05

## 2024-05-22 RX ADMIN — DICYCLOMINE HYDROCHLORIDE 20 MG: 10 CAPSULE ORAL at 09:05

## 2024-05-22 RX ADMIN — DROPERIDOL 1.25 MG: 2.5 INJECTION, SOLUTION INTRAMUSCULAR; INTRAVENOUS at 10:05

## 2024-05-22 NOTE — TELEPHONE ENCOUNTER
Called pt to schedule CT scan for pancreas. Pt states he is in a lot of pan and going to the ED now. Order in for CT to get scheduled at earliest convenience.  Pt verbalized understanding

## 2024-05-23 VITALS
SYSTOLIC BLOOD PRESSURE: 119 MMHG | BODY MASS INDEX: 20.04 KG/M2 | HEIGHT: 70 IN | TEMPERATURE: 99 F | RESPIRATION RATE: 18 BRPM | DIASTOLIC BLOOD PRESSURE: 65 MMHG | OXYGEN SATURATION: 99 % | WEIGHT: 140 LBS | HEART RATE: 78 BPM

## 2024-05-23 NOTE — DISCHARGE INSTRUCTIONS
Please follow-up with your GI doctor.  Obtain the CT abdomen that has been ordered for you.  Take Phenergan that you have at home for nausea and vomiting.  You may also take the Toradol that you have at home for pain control.  Return to the ER with concerning or worsening symptoms

## 2024-05-23 NOTE — ED PROVIDER NOTES
Encounter Date: 5/22/2024       History     Chief Complaint   Patient presents with    Abdominal Pain    Vomiting     Thinks its his pancreatitis flaring uop     24-year-old male which presents to the emergency room with abdominal pain and vomiting that began on Monday.   Patient denies any fevers.  He has a history of acute necrotizing pancreatitis, alcohol abuse, hepatitis-C, HIV, and polycythemia vera.  Patient denies any other symptoms.    The history is provided by the patient.     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     Past Surgical History:   Procedure Laterality Date    ENDOSCOPIC ULTRASOUND OF UPPER GASTROINTESTINAL TRACT N/A 10/23/2023    Procedure: ULTRASOUND, UPPER GI TRACT, ENDOSCOPIC;  Surgeon: Emil Villatoro MD;  Location: [address]);  Service: Endoscopy;  Laterality: N/A;    ERCP N/A 10/23/2023    Procedure: ERCP (ENDOSCOPIC RETROGRADE CHOLANGIOPANCREATOGRAPHY);  Surgeon: Emil Villatoro MD;  Location: [address]);  Service: Endoscopy;  Laterality: N/A;    ESOPHAGOGASTRODUODENOSCOPY N/A 9/18/2023    Procedure: EGD (ESOPHAGOGASTRODUODENOSCOPY);  Surgeon: Kg Cleaning MD;  Location: [address]);  Service: Endoscopy;  Laterality: N/A;    ESOPHAGOGASTRODUODENOSCOPY N/A  3/8/2024    Procedure: EGD (ESOPHAGOGASTRODUODENOSCOPY);  Surgeon: Greg Love MD;  Location: [facility] ([address] [place] [address]);  Service: Endoscopy;  Laterality: N/A;     Family History   Problem Relation Name Age of Onset    Pancreatitis Mother      Cancer Mother      Ovarian cancer Mother      No Known Problems Father      Cancer Brother      Breast cancer Maternal Grandmother       Social History     Tobacco Use    Smoking status: Former     Types: Cigarettes    Smokeless tobacco: Never   Substance Use Topics    Alcohol use: Not Currently    Drug use: Never     Review of Systems   Constitutional:  Negative for fever.   HENT:  Negative for sore throat.    Respiratory:  Negative for shortness of breath.    Cardiovascular:  Negative for chest pain.   Gastrointestinal:  Positive for abdominal pain, nausea and vomiting.   Genitourinary:  Negative for dysuria.   Musculoskeletal:  Negative for back pain.   Skin:  Negative for rash.   Neurological:  Negative for weakness.   Hematological:  Does not bruise/bleed easily.   All other systems reviewed and are negative.    Physical Exam     Initial Vitals [05/22/24 1748]   BP Pulse Resp Temp SpO2   126/76 (!) 116 15 98.2 °F (36.8 °C) 95 %      MAP       --         Physical Exam    Nursing note and vitals reviewed.  Constitutional: He appears well-developed and well-nourished.   HENT:   Head: Normocephalic and atraumatic.    Dry oral mucous membranes   Eyes: Conjunctivae and EOM are normal. Pupils are equal, round, and reactive to light.   Neck:   Normal range of motion.  Cardiovascular:  Normal rate, regular rhythm, normal heart sounds and intact distal pulses.     Exam reveals no gallop and no friction rub.       No murmur heard.  Pulmonary/Chest: Breath sounds normal. No respiratory distress. He has no wheezes. He has no rhonchi. He has no rales. He exhibits no tenderness.   Abdominal: Abdomen is soft. Bowel sounds are normal. He exhibits no distension and no mass. There is  generalized abdominal tenderness.   Pain is worse in the epigastric and left upper quadrant There is no rebound and no guarding.   Musculoskeletal:         General: No tenderness or edema. Normal range of motion.      Cervical back: Normal range of motion.     Neurological: He is alert and oriented to person, place, and time. He has normal strength. GCS score is 15. GCS eye subscore is 4. GCS verbal subscore is 5. GCS motor subscore is 6.   Skin: Skin is warm. Capillary refill takes less than 2 seconds.       ED Course   Procedures  Labs Reviewed   CBC W/ AUTO DIFFERENTIAL - Abnormal; Notable for the following components:       Result Value    Hemoglobin 11.9 (*)     MCH 25.0 (*)     MCHC 29.7 (*)     RDW 21.2 (*)     Gran % 32.8 (*)     Lymph % 53.5 (*)     All other components within normal limits   COMPREHENSIVE METABOLIC PANEL - Abnormal; Notable for the following components:    CO2 20 (*)     Calcium 8.6 (*)     All other components within normal limits   ISTAT PROCEDURE - Abnormal; Notable for the following components:    POC BUN 5 (*)     POC TCO2 (MEASURED) 22 (*)     All other components within normal limits   LIPASE   URINALYSIS, REFLEX TO URINE CULTURE   ISTAT CHEM8          Imaging Results    None          Medications   ondansetron disintegrating tablet 4 mg (4 mg Oral Given 5/22/24 2105)   sodium chloride 0.9% bolus 1,000 mL 1,000 mL (0 mLs Intravenous Stopped 5/22/24 2206)   ketorolac injection 9.999 mg (9.999 mg Intravenous Given 5/22/24 2105)   dicyclomine capsule 20 mg (20 mg Oral Given 5/22/24 2105)     Medical Decision Making   24-year-old male which presents to the emergency room with abdominal pain along with nausea and vomiting that began 2 days ago.  Patient has been seen in the emergency room for the same problem and does not have evidence of pancreatitis.  The patient was given Toradol and Bentyl to help with abdominal pain.  He has also been given a L of fluid to help with tachycardia.    Lab work is pending at 2200 inpatient care has been transitioned to JADE Walton  for follow-up of labs and final disposition.    \Differential Diagnosis: gastroenteritis, gastritis, ulcer, cholecystitis, gallstones, pancreatitis, ileus, small bowel obstruction, appendicitis, constipation            Problems Addressed:  Abdominal pain, unspecified abdominal location: acute illness or injury  Nausea and vomiting, unspecified vomiting type: acute illness or injury    Amount and/or Complexity of Data Reviewed  Labs: ordered.    Risk  Prescription drug management.               ED Course as of 05/22/24 2208   Wed May 22, 2024   1957 BP: 126/76 [AT]   1957 Temp: 98.2 °F (36.8 °C) [AT]   1957 Temp Source: Oral [AT]   1957 Pulse(!): 116 [AT]   1957 Resp: 15 [AT]   1957 SpO2: 95 % [AT]      ED Course User Index  [AT] Radha Boo FNP                           Clinical Impression:  Final diagnoses:  [R10.9] Abdominal pain, unspecified abdominal location (Primary)  [R11.2] Nausea and vomiting, unspecified vomiting type                 Radha Boo FNP  05/22/24 2208

## 2024-05-23 NOTE — ED NOTES
I-STAT Chem-8+ Results:   Value Reference Range   Sodium 139 136-145 mmol/L   Potassium  3.7 3.5-5.1 mmol/L   Chloride 107  mmol/L   Ionized Calcium 1.10 1.06-1.42 mmol/L   CO2 (measured) 22 23-29 mmol/L   Glucose 99  mg/dL   BUN 5 6-30 mg/dL   Creatinine 1.0 0.5-1.4 mg/dL   Hematocrit 40 36-54%

## 2024-05-23 NOTE — ED NOTES
Tasia Cardona, an 24 y.o. male presents to the ED c/o Luq abd pain x 2 days. +N/V/D, cold sweats. Denies fevers. Believes it may be a flare up of pancreatitis. Took promethazine without relief      Review of patient's allergies indicates:   Allergen Reactions    [place] Swelling    Pecan nut Swelling    Penicillins Hives     Tolerates cephalosporins    Tolerated piperacillin/tazobactam 11/2023    Soy Other (See Comments)    Sulfa (sulfonamide antibiotics) Hives     Chief Complaint   Patient presents with    Abdominal Pain    Vomiting     Thinks its his pancreatitis flaring uop     Past Medical History:   Diagnosis Date    Acute necrotizing pancreatitis 09/20/2023    Alcohol use disorder 10/05/2023    Asthma     Hepatitis C antibody positive in blood 09/18/2023 9/2023 PCR negative    HIV infection 10/2021    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

## 2024-05-23 NOTE — PROVIDER PROGRESS NOTES - EMERGENCY DEPT.
Encounter Date: 5/22/2024    ED Physician Progress Notes        Physician Note:   Case was signed out to be my Radha Boo NP at shift change    Briefly 24-year-old male with chronic pancreatitis presenting with epigastric abdominal pain.  Has had multiple episodes of vomiting since Monday.    Lab work reassuring, white count 6.1.  Hemoglobin stable.  Chemistries unremarkable, CO2 20 likely due to dehydration, anion gap 11.  Lipase unremarkable.      Patient continues to have pain.  He states me that he contacted his GI doctor today who wanted the CT abdomen and pelvis with contrast.  This was ordered.  He states he thought that this was supposed to be done in the emergency room.  Will obtain CT abdomen pelvis.    12:04 AM  On reassessment patient states that he does not want to wait for the CT of the abdomen pelvis and will obtain an outpatient as he does have a pending order.  He states he feels much better.  I did offer to prescribe patient home with Toradol however he states that he does have oral Toradol.  He also has Phenergan to take for nausea and vomiting.  Given strict return precautions ED which was agreeable to.  Will advise close follow-up with GI doctor.  He is otherwise stable for discharge

## 2024-06-10 PROBLEM — K92.0 HEMATEMESIS: Status: RESOLVED | Noted: 2023-09-17 | Resolved: 2024-06-10

## 2024-06-17 NOTE — PLAN OF CARE
PCA continued as ordered. Pt comfortable. Stated the dose adjustments have helped for the pain. Stable. Reinforced current plan of care. [place] within reach.      Photo Preface (Leave Blank If You Do Not Want): Photographs were obtained today Detail Level: Zone

## 2024-07-23 ENCOUNTER — HOSPITAL ENCOUNTER (INPATIENT)
Facility: HOSPITAL | Age: 24
LOS: 6 days | Discharge: HOME | DRG: 438 | End: 2024-07-29
Attending: EMERGENCY MEDICINE | Admitting: FAMILY MEDICINE
Payer: MEDICAID

## 2024-07-23 DIAGNOSIS — K86.1 ACUTE ON CHRONIC PANCREATITIS: ICD-10-CM

## 2024-07-23 DIAGNOSIS — B20 HIV INFECTION, UNSPECIFIED SYMPTOM STATUS: ICD-10-CM

## 2024-07-23 DIAGNOSIS — K85.90 ACUTE PANCREATITIS, UNSPECIFIED COMPLICATION STATUS, UNSPECIFIED PANCREATITIS TYPE: Primary | ICD-10-CM

## 2024-07-23 DIAGNOSIS — K85.90 ACUTE ON CHRONIC PANCREATITIS: ICD-10-CM

## 2024-07-23 DIAGNOSIS — K86.1 CHRONIC PANCREATITIS, UNSPECIFIED PANCREATITIS TYPE: ICD-10-CM

## 2024-07-23 DIAGNOSIS — K86.89 PERIPANCREATIC FLUID COLLECTION: ICD-10-CM

## 2024-07-23 LAB
ALBUMIN SERPL BCP-MCNC: 3.7 G/DL (ref 3.5–5.2)
ALP SERPL-CCNC: 167 U/L (ref 55–135)
ALT SERPL W/O P-5'-P-CCNC: 17 U/L (ref 10–44)
ANION GAP SERPL CALC-SCNC: 12 MMOL/L (ref 8–16)
AST SERPL-CCNC: 20 U/L (ref 10–40)
BASOPHILS # BLD AUTO: 0.04 K/UL (ref 0–0.2)
BASOPHILS NFR BLD: 0.5 % (ref 0–1.9)
BILIRUB SERPL-MCNC: 0.9 MG/DL (ref 0.1–1)
BUN SERPL-MCNC: 5 MG/DL (ref 6–20)
CALCIUM SERPL-MCNC: 9.2 MG/DL (ref 8.7–10.5)
CHLORIDE SERPL-SCNC: 102 MMOL/L (ref 95–110)
CO2 SERPL-SCNC: 21 MMOL/L (ref 23–29)
CREAT SERPL-MCNC: 0.8 MG/DL (ref 0.5–1.4)
DIFFERENTIAL METHOD BLD: ABNORMAL
EOSINOPHIL # BLD AUTO: 0.2 K/UL (ref 0–0.5)
EOSINOPHIL NFR BLD: 2.1 % (ref 0–8)
ERYTHROCYTE [DISTWIDTH] IN BLOOD BY AUTOMATED COUNT: 18.7 % (ref 11.5–14.5)
EST. GFR  (NO RACE VARIABLE): >60 ML/MIN/1.73 M^2
GLUCOSE SERPL-MCNC: 86 MG/DL (ref 70–110)
HCT VFR BLD AUTO: 44.6 % (ref 40–54)
HGB BLD-MCNC: 13.6 G/DL (ref 14–18)
IMM GRANULOCYTES # BLD AUTO: 0.03 K/UL (ref 0–0.04)
IMM GRANULOCYTES NFR BLD AUTO: 0.4 % (ref 0–0.5)
LIPASE SERPL-CCNC: 29 U/L (ref 4–60)
LYMPHOCYTES # BLD AUTO: 1.7 K/UL (ref 1–4.8)
LYMPHOCYTES NFR BLD: 20.3 % (ref 18–48)
MCH RBC QN AUTO: 25.6 PG (ref 27–31)
MCHC RBC AUTO-ENTMCNC: 30.5 G/DL (ref 32–36)
MCV RBC AUTO: 84 FL (ref 82–98)
MONOCYTES # BLD AUTO: 0.8 K/UL (ref 0.3–1)
MONOCYTES NFR BLD: 9.2 % (ref 4–15)
NEUTROPHILS # BLD AUTO: 5.7 K/UL (ref 1.8–7.7)
NEUTROPHILS NFR BLD: 67.5 % (ref 38–73)
NRBC BLD-RTO: 0 /100 WBC
PLATELET # BLD AUTO: 257 K/UL (ref 150–450)
PMV BLD AUTO: 10 FL (ref 9.2–12.9)
POTASSIUM SERPL-SCNC: 3.3 MMOL/L (ref 3.5–5.1)
PROT SERPL-MCNC: 7.9 G/DL (ref 6–8.4)
RBC # BLD AUTO: 5.31 M/UL (ref 4.6–6.2)
SODIUM SERPL-SCNC: 135 MMOL/L (ref 136–145)
WBC # BLD AUTO: 8.44 K/UL (ref 3.9–12.7)

## 2024-07-23 PROCEDURE — 80053 COMPREHEN METABOLIC PANEL: CPT | Performed by: EMERGENCY MEDICINE

## 2024-07-23 PROCEDURE — 96374 THER/PROPH/DIAG INJ IV PUSH: CPT

## 2024-07-23 PROCEDURE — 12000002 HC ACUTE/MED SURGE SEMI-PRIVATE ROOM

## 2024-07-23 PROCEDURE — 63600175 PHARM REV CODE 636 W HCPCS: Performed by: PHYSICIAN ASSISTANT

## 2024-07-23 PROCEDURE — 99285 EMERGENCY DEPT VISIT HI MDM: CPT | Mod: 25

## 2024-07-23 PROCEDURE — 25000003 PHARM REV CODE 250: Performed by: PHYSICIAN ASSISTANT

## 2024-07-23 PROCEDURE — 96372 THER/PROPH/DIAG INJ SC/IM: CPT | Performed by: PHYSICIAN ASSISTANT

## 2024-07-23 PROCEDURE — 85025 COMPLETE CBC W/AUTO DIFF WBC: CPT | Performed by: EMERGENCY MEDICINE

## 2024-07-23 PROCEDURE — 96361 HYDRATE IV INFUSION ADD-ON: CPT

## 2024-07-23 PROCEDURE — 96375 TX/PRO/DX INJ NEW DRUG ADDON: CPT

## 2024-07-23 PROCEDURE — 25500020 PHARM REV CODE 255: Performed by: PHYSICIAN ASSISTANT

## 2024-07-23 PROCEDURE — 83690 ASSAY OF LIPASE: CPT | Performed by: EMERGENCY MEDICINE

## 2024-07-23 RX ORDER — SODIUM CHLORIDE 9 MG/ML
INJECTION, SOLUTION INTRAVENOUS CONTINUOUS
Status: DISCONTINUED | OUTPATIENT
Start: 2024-07-23 | End: 2024-07-28

## 2024-07-23 RX ORDER — DROPERIDOL 2.5 MG/ML
1.25 INJECTION, SOLUTION INTRAMUSCULAR; INTRAVENOUS
Status: DISCONTINUED | OUTPATIENT
Start: 2024-07-23 | End: 2024-07-23

## 2024-07-23 RX ORDER — AMOXICILLIN 250 MG
1 CAPSULE ORAL 2 TIMES DAILY PRN
Status: DISCONTINUED | OUTPATIENT
Start: 2024-07-23 | End: 2024-07-29 | Stop reason: HOSPADM

## 2024-07-23 RX ORDER — TALC
6 POWDER (GRAM) TOPICAL NIGHTLY PRN
Status: DISCONTINUED | OUTPATIENT
Start: 2024-07-23 | End: 2024-07-29 | Stop reason: HOSPADM

## 2024-07-23 RX ORDER — IBUPROFEN 200 MG
16 TABLET ORAL
Status: DISCONTINUED | OUTPATIENT
Start: 2024-07-23 | End: 2024-07-29 | Stop reason: HOSPADM

## 2024-07-23 RX ORDER — HEPARIN SODIUM 5000 [USP'U]/ML
5000 INJECTION, SOLUTION INTRAVENOUS; SUBCUTANEOUS EVERY 8 HOURS
Status: DISCONTINUED | OUTPATIENT
Start: 2024-07-23 | End: 2024-07-29 | Stop reason: HOSPADM

## 2024-07-23 RX ORDER — HYDROMORPHONE HYDROCHLORIDE 1 MG/ML
1 INJECTION, SOLUTION INTRAMUSCULAR; INTRAVENOUS; SUBCUTANEOUS
Status: COMPLETED | OUTPATIENT
Start: 2024-07-23 | End: 2024-07-23

## 2024-07-23 RX ORDER — GLUCAGON 1 MG
1 KIT INJECTION
Status: DISCONTINUED | OUTPATIENT
Start: 2024-07-23 | End: 2024-07-29 | Stop reason: HOSPADM

## 2024-07-23 RX ORDER — SODIUM CHLORIDE 0.9 % (FLUSH) 0.9 %
10 SYRINGE (ML) INJECTION EVERY 8 HOURS
Status: DISCONTINUED | OUTPATIENT
Start: 2024-07-23 | End: 2024-07-24

## 2024-07-23 RX ORDER — DICYCLOMINE HYDROCHLORIDE 10 MG/ML
20 INJECTION INTRAMUSCULAR
Status: COMPLETED | OUTPATIENT
Start: 2024-07-23 | End: 2024-07-23

## 2024-07-23 RX ORDER — MORPHINE SULFATE 4 MG/ML
4 INJECTION, SOLUTION INTRAMUSCULAR; INTRAVENOUS
Status: COMPLETED | OUTPATIENT
Start: 2024-07-23 | End: 2024-07-23

## 2024-07-23 RX ORDER — ACETAMINOPHEN 325 MG/1
650 TABLET ORAL EVERY 6 HOURS PRN
Status: DISCONTINUED | OUTPATIENT
Start: 2024-07-23 | End: 2024-07-29 | Stop reason: HOSPADM

## 2024-07-23 RX ORDER — PANTOPRAZOLE SODIUM 40 MG/1
40 TABLET, DELAYED RELEASE ORAL DAILY
Status: DISCONTINUED | OUTPATIENT
Start: 2024-07-24 | End: 2024-07-29 | Stop reason: HOSPADM

## 2024-07-23 RX ORDER — FAMOTIDINE 10 MG/ML
20 INJECTION INTRAVENOUS
Status: COMPLETED | OUTPATIENT
Start: 2024-07-23 | End: 2024-07-23

## 2024-07-23 RX ORDER — IBUPROFEN 200 MG
24 TABLET ORAL
Status: DISCONTINUED | OUTPATIENT
Start: 2024-07-23 | End: 2024-07-29 | Stop reason: HOSPADM

## 2024-07-23 RX ORDER — ONDANSETRON HYDROCHLORIDE 2 MG/ML
4 INJECTION, SOLUTION INTRAVENOUS
Status: COMPLETED | OUTPATIENT
Start: 2024-07-23 | End: 2024-07-23

## 2024-07-23 RX ORDER — NALOXONE HCL 0.4 MG/ML
0.02 VIAL (ML) INJECTION
Status: DISCONTINUED | OUTPATIENT
Start: 2024-07-23 | End: 2024-07-29 | Stop reason: HOSPADM

## 2024-07-23 RX ORDER — HYDROMORPHONE HYDROCHLORIDE 2 MG/1
2 TABLET ORAL
Status: DISCONTINUED | OUTPATIENT
Start: 2024-07-23 | End: 2024-07-24

## 2024-07-23 RX ORDER — HYDROMORPHONE HYDROCHLORIDE 1 MG/ML
1.5 INJECTION, SOLUTION INTRAMUSCULAR; INTRAVENOUS; SUBCUTANEOUS EVERY 4 HOURS PRN
Status: DISCONTINUED | OUTPATIENT
Start: 2024-07-23 | End: 2024-07-25

## 2024-07-23 RX ADMIN — SODIUM CHLORIDE: 9 INJECTION, SOLUTION INTRAVENOUS at 08:07

## 2024-07-23 RX ADMIN — HYDROMORPHONE HYDROCHLORIDE 1.5 MG: 1 INJECTION, SOLUTION INTRAMUSCULAR; INTRAVENOUS; SUBCUTANEOUS at 11:07

## 2024-07-23 RX ADMIN — MORPHINE SULFATE 4 MG: 4 INJECTION INTRAVENOUS at 07:07

## 2024-07-23 RX ADMIN — HYDROMORPHONE HYDROCHLORIDE 1 MG: 1 INJECTION, SOLUTION INTRAMUSCULAR; INTRAVENOUS; SUBCUTANEOUS at 08:07

## 2024-07-23 RX ADMIN — SODIUM CHLORIDE, POTASSIUM CHLORIDE, SODIUM LACTATE AND CALCIUM CHLORIDE 1000 ML: 600; 310; 30; 20 INJECTION, SOLUTION INTRAVENOUS at 05:07

## 2024-07-23 RX ADMIN — POTASSIUM BICARBONATE 40 MEQ: 391 TABLET, EFFERVESCENT ORAL at 06:07

## 2024-07-23 RX ADMIN — ONDANSETRON 4 MG: 2 INJECTION INTRAMUSCULAR; INTRAVENOUS at 06:07

## 2024-07-23 RX ADMIN — DICYCLOMINE HYDROCHLORIDE 20 MG: 20 INJECTION, SOLUTION INTRAMUSCULAR at 06:07

## 2024-07-23 RX ADMIN — IOHEXOL 100 ML: 350 INJECTION, SOLUTION INTRAVENOUS at 04:07

## 2024-07-23 RX ADMIN — FAMOTIDINE 20 MG: 10 INJECTION, SOLUTION INTRAVENOUS at 05:07

## 2024-07-23 NOTE — FIRST PROVIDER EVALUATION
Emergency Department First Provider Evaluation    Tasia Cardona   24 y.o. male   [id number]      7/23/2024        Medical screening examination initiated prior to patient room assignment.        History of present illness:  Complains of abdominal pain that feels like previous episodes of pancreatitis. Associated nausea and vomiting. Denies alcohol use.    There were no vitals filed for this visit.    Pertinent physical examination findings:  No apparent distress.     Brief workup plan:  Ordered labs (CBC, CMP, Lipase).  Await bed for provider evaluation to determine additional workup and treatment needs.          This brief and focused assessment was performed to initiate workup and treatment. Follow-up of these results will be performed by the assigned treatment team. Additional data collection, labs, imaging studies, and treatments may be needed for completion of this patient encounter.

## 2024-07-23 NOTE — ED PROVIDER NOTES
Encounter Date: 7/23/2024       History     Chief Complaint   Patient presents with    Abdominal Pain     Hx pancreatitis      24-year-old male past medical history of necrotizing pancreatitis, HIV, pancreatic pseudocyst, Corinne-Chauhan tear who presents the ED for epigastric abdominal pain.  States it started this morning describes it as sharp sometimes squeezing in the epigastric region and also in the left lower quadrant.  Also reports nausea vomiting.  No fever but he was also having some chills.  No dysuria, hematuria, constipation or diarrhea.  States he was scheduled for an EGD this Friday.    The history is provided by the patient.     Review of patient's allergies indicates:   Allergen Reactions    [place] Swelling    Pecan nut Swelling    Penicillins Hives     Tolerates cephalosporins    Tolerated piperacillin/tazobactam 11/2023    Soy Other (See Comments)    Sulfa (sulfonamide antibiotics) Hives     Past Medical History:   Diagnosis Date    Acute necrotizing pancreatitis 09/20/2023    Alcohol use disorder 10/05/2023    Asthma     Hepatitis C antibody positive in blood 09/18/2023 9/2023 PCR negative    HIV infection 10/2021    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     Past Surgical History:   Procedure Laterality Date    ENDOSCOPIC ULTRASOUND OF UPPER GASTROINTESTINAL TRACT N/A 10/23/2023    Procedure: ULTRASOUND, UPPER GI TRACT, ENDOSCOPIC;  Surgeon: Emil Villatoro MD;  Location: [address]);  Service: Endoscopy;  Laterality: N/A;    ERCP N/A 10/23/2023    Procedure: ERCP (ENDOSCOPIC RETROGRADE CHOLANGIOPANCREATOGRAPHY);  Surgeon: Emil Villatoro MD;  Location: [address]);  Service: Endoscopy;  Laterality: N/A;    ESOPHAGOGASTRODUODENOSCOPY N/A 9/18/2023    Procedure: EGD  (ESOPHAGOGASTRODUODENOSCOPY);  Surgeon: Kg Cleaning MD;  [facility]  Service: Endoscopy;  Laterality: N/A;    ESOPHAGOGASTRODUODENOSCOPY N/A 3/8/2024    Procedure: EGD (ESOPHAGOGASTRODUODENOSCOPY);  Surgeon: Greg Love MD;  [facility]  Service: Endoscopy;  Laterality: N/A;     Family History   Problem Relation Name Age of Onset    Pancreatitis Mother      Cancer Mother      Ovarian cancer Mother      No Known Problems Father      Cancer Brother      Breast cancer Maternal Grandmother       Social History     Tobacco Use    Smoking status: Former     Types: Cigarettes    Smokeless tobacco: Never   Substance Use Topics    Alcohol use: Not Currently    Drug use: Never     Review of Systems   Constitutional:  Positive for chills. Negative for fever.   HENT:  Negative for sore throat.    Respiratory:  Negative for cough and shortness of breath.    Cardiovascular:  Negative for chest pain.   Gastrointestinal:  Positive for abdominal pain, nausea and vomiting. Negative for blood in stool and diarrhea.   Genitourinary:  Negative for difficulty urinating and dysuria.   Musculoskeletal: Negative.    Skin: Negative.    Neurological:  Negative for weakness.   Psychiatric/Behavioral:  Negative for confusion.        Physical Exam     Initial Vitals [07/23/24 1417]   BP Pulse Resp Temp SpO2   132/78 97 18 99.3 °F (37.4 °C) 99 %      MAP       --         Physical Exam    Nursing note and vitals reviewed.  Constitutional: He appears well-developed and well-nourished.   HENT:   Head: Normocephalic and atraumatic.   Eyes: Conjunctivae are normal. Pupils are equal, round, and reactive to light.   Neck: Neck supple.   Normal range of motion.  Cardiovascular:  Normal rate.           Pulmonary/Chest: Breath sounds normal. No respiratory distress.   Abdominal: Abdomen is soft. There is abdominal tenderness (Epigastric, LLQ).   Musculoskeletal:         General: Normal range of motion.       Cervical back: Normal range of motion and neck supple.     Neurological: He is alert and oriented to person, place, and time. GCS score is 15. GCS eye subscore is 4. GCS verbal subscore is 5. GCS motor subscore is 6.   Skin: Skin is warm and dry. Capillary refill takes less than 2 seconds.         ED Course   Procedures  Labs Reviewed   CBC W/ AUTO DIFFERENTIAL - Abnormal       Result Value    WBC 8.44      RBC 5.31      Hemoglobin 13.6 (*)     Hematocrit 44.6      MCV 84      MCH 25.6 (*)     MCHC 30.5 (*)     RDW 18.7 (*)     Platelets 257      MPV 10.0      Immature Granulocytes 0.4      Gran # (ANC) 5.7      Immature Grans (Abs) 0.03      Lymph # 1.7      Mono # 0.8      Eos # 0.2      Baso # 0.04      nRBC 0      Gran % 67.5      Lymph % 20.3      Mono % 9.2      Eosinophil % 2.1      Basophil % 0.5      Differential Method Automated     COMPREHENSIVE METABOLIC PANEL - Abnormal    Sodium 135 (*)     Potassium 3.3 (*)     Chloride 102      CO2 21 (*)     Glucose 86      BUN 5 (*)     Creatinine 0.8      Calcium 9.2      Total Protein 7.9      Albumin 3.7      Total Bilirubin 0.9      Alkaline Phosphatase 167 (*)     AST 20      ALT 17      eGFR >60.0      Anion Gap 12     LIPASE    Lipase 29            Imaging Results              CT Abdomen Pelvis With IV Contrast NO Oral Contrast (Final result)  Result time 07/23/24 18:38:56      Final result by Dakotah Tejada MD (07/23/24 18:38:56)                   Impression:      1. Findings suggesting sequela of mild acute pancreatitis.  New versus interval enlargement/organization of previous peripancreatic collection along the anterior upper aspect of the pancreas.  Abscess considered less likely but not entirely excluded.  Previous additional peripancreatic collections have resolved.  2. Resolution of previous lower esophageal wall thickening.  3. Hepatosplenomegaly.  4. 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  5. Additional  findings as detailed above.      Electronically signed by: Dakotah Tejada MD  Date:    07/23/2024  Time:    18:38               Narrative:    EXAMINATION:  CT ABDOMEN PELVIS WITH IV CONTRAST    CLINICAL HISTORY:  Epigastric pain;    TECHNIQUE:  Low dose axial images, sagittal and coronal reformations were obtained from the lung bases to the pubic symphysis following the IV administration of 75 mL of Omnipaque 350 .  Oral contrast was not given.    COMPARISON:  Right upper quadrant ultrasound 03/08/2024, CT abdomen and pelvis 03/04/2024    FINDINGS:  No consolidation or pleural effusion at the imaged lung bases.  Base of the heart is within normal limits.    No significant biliary ductal dilatation.  Liver and spleen are both enlarged without focal process.  Portal veins are patent.  Chronic splenic vein thrombosis with left upper quadrant collateral vessels similar to prior.    Subtle fat stranding about the pancreatic head and neck, although to a lesser degree from 03/04/2024 study.  Previous pancreatic collection along the anterior and superior aspect of the pancreatic head, neck and proximal body appears larger and more organized now measuring approximately 3.8 x 6.8 cm.  No associated internal gas foci.  Previous additional several peripancreatic collections have resolved.  No enhancing pancreatic mass.  No pancreatic ductal dilatation or parenchymal calcifications.    Stomach, duodenum and bilateral adrenal glands are within normal limits.    Bilateral kidneys are normal in size, shape and location with symmetric normal enhancement.  No hydronephrosis or significant perinephric stranding.  Retroaortic left renal vein.  Ureters are normal in course and caliber.  Urinary bladder is well distended without wall thickening.  Prostate and seminal vesicles are within normal limits.  Few scattered small pelvic phleboliths noted.    Appendix and terminal ileum are within normal limits.  No evidence of bowel obstruction or  acute bowel inflammation.  No pneumatosis or portal venous gas.    No ascites, free air or lymphadenopathy by CT criteria.    No significant atheromatous disease.  No aortic aneurysm or dissection.    Extraperitoneal soft tissues are within normal limits.  Osseous structures are within normal limits.                                       Medications   lactated ringers bolus 1,000 mL (0 mLs Intravenous Stopped 7/23/24 1848)   famotidine (PF) injection 20 mg (20 mg Intravenous Given 7/23/24 1748)   iohexoL (OMNIPAQUE 350) injection 100 mL (100 mLs Intravenous Given 7/23/24 1659)   potassium bicarbonate disintegrating tablet 40 mEq (40 mEq Oral Given 7/23/24 1813)   ondansetron injection 4 mg (4 mg Intravenous Given 7/23/24 1813)   dicyclomine injection 20 mg (20 mg Intramuscular Given 7/23/24 1813)   morphine injection 4 mg (4 mg Intravenous Given 7/23/24 1914)   HYDROmorphone injection 1 mg (1 mg Intravenous Given 7/23/24 2007)     Medical Decision Making  24-year-old male presents ED for epigastric abdominal pain.      Differential includes but not limited to acute pancreatitis, chronic pancreatitis, gastritis, cholecystitis, electrolyte derangement, dehydration, intra-abdominal abscess, pancreatic pseudocyst     History ETOH abuse pancreatitis.  States last time he drank was about 30 days ago had 1 glass of wine.  Epigastric abdominal pain which started this morning with nausea vomiting.  CT abdomen pelvis shows new versus enlarging peripancreatic fluid collection.  Can not fully rule out abscess although my suspicion is low for abscess at this time.  No leukocytosis.  Vital signs stable.  Received IV morphine in the ED stone significant pain.  Will admit to hospital medicine for pain control and GI versus IR consult.    Amount and/or Complexity of Data Reviewed  Labs:  Decision-making details documented in ED Course.  Radiology: ordered.    Risk  Prescription drug management.  Decision regarding  hospitalization.               ED Course as of 07/23/24 2019 Tue Jul 23, 2024   1643 Lipase: 29  WNL [HJ]   1817 WBC: 8.44  No leukocytosis [HJ]      ED Course User Index  [HJ] Lobo Arango PA-C                           Clinical Impression:  Final diagnoses:  [K85.90] Acute pancreatitis, unspecified complication status, unspecified pancreatitis type (Primary)  [K86.89] Peripancreatic fluid collection          ED Disposition Condition    Admit Stable                Lobo Arango PA-C  07/23/24 2019

## 2024-07-24 ENCOUNTER — ANESTHESIA (OUTPATIENT)
Dept: ENDOSCOPY | Facility: HOSPITAL | Age: 24
End: 2024-07-24
Payer: MEDICAID

## 2024-07-24 ENCOUNTER — ANESTHESIA EVENT (OUTPATIENT)
Dept: ENDOSCOPY | Facility: HOSPITAL | Age: 24
End: 2024-07-24
Payer: MEDICAID

## 2024-07-24 LAB
ANION GAP SERPL CALC-SCNC: 7 MMOL/L (ref 8–16)
BASOPHILS # BLD AUTO: 0.04 K/UL (ref 0–0.2)
BASOPHILS NFR BLD: 0.6 % (ref 0–1.9)
BUN SERPL-MCNC: 3 MG/DL (ref 6–20)
CALCIUM SERPL-MCNC: 8.6 MG/DL (ref 8.7–10.5)
CHLORIDE SERPL-SCNC: 103 MMOL/L (ref 95–110)
CO2 SERPL-SCNC: 28 MMOL/L (ref 23–29)
CREAT SERPL-MCNC: 0.7 MG/DL (ref 0.5–1.4)
DIFFERENTIAL METHOD BLD: ABNORMAL
EOSINOPHIL # BLD AUTO: 0.3 K/UL (ref 0–0.5)
EOSINOPHIL NFR BLD: 4 % (ref 0–8)
ERYTHROCYTE [DISTWIDTH] IN BLOOD BY AUTOMATED COUNT: 18.5 % (ref 11.5–14.5)
EST. GFR  (NO RACE VARIABLE): >60 ML/MIN/1.73 M^2
GLUCOSE SERPL-MCNC: 80 MG/DL (ref 70–110)
HCT VFR BLD AUTO: 38.4 % (ref 40–54)
HGB BLD-MCNC: 11.5 G/DL (ref 14–18)
IMM GRANULOCYTES # BLD AUTO: 0.02 K/UL (ref 0–0.04)
IMM GRANULOCYTES NFR BLD AUTO: 0.3 % (ref 0–0.5)
LYMPHOCYTES # BLD AUTO: 2.2 K/UL (ref 1–4.8)
LYMPHOCYTES NFR BLD: 32.9 % (ref 18–48)
MAGNESIUM SERPL-MCNC: 1.9 MG/DL (ref 1.6–2.6)
MCH RBC QN AUTO: 25.9 PG (ref 27–31)
MCHC RBC AUTO-ENTMCNC: 29.9 G/DL (ref 32–36)
MCV RBC AUTO: 87 FL (ref 82–98)
MONOCYTES # BLD AUTO: 0.8 K/UL (ref 0.3–1)
MONOCYTES NFR BLD: 12.2 % (ref 4–15)
NEUTROPHILS # BLD AUTO: 3.4 K/UL (ref 1.8–7.7)
NEUTROPHILS NFR BLD: 50 % (ref 38–73)
NRBC BLD-RTO: 0 /100 WBC
PLATELET # BLD AUTO: 217 K/UL (ref 150–450)
PMV BLD AUTO: 10 FL (ref 9.2–12.9)
POTASSIUM SERPL-SCNC: 3.3 MMOL/L (ref 3.5–5.1)
RBC # BLD AUTO: 4.44 M/UL (ref 4.6–6.2)
SODIUM SERPL-SCNC: 138 MMOL/L (ref 136–145)
WBC # BLD AUTO: 6.8 K/UL (ref 3.9–12.7)

## 2024-07-24 PROCEDURE — 25000003 PHARM REV CODE 250: Performed by: HOSPITALIST

## 2024-07-24 PROCEDURE — 25500020 PHARM REV CODE 255: Performed by: FAMILY MEDICINE

## 2024-07-24 PROCEDURE — 21400001 HC TELEMETRY ROOM

## 2024-07-24 PROCEDURE — 43259 EGD US EXAM DUODENUM/JEJUNUM: CPT | Mod: ,,, | Performed by: INTERNAL MEDICINE

## 2024-07-24 PROCEDURE — 99223 1ST HOSP IP/OBS HIGH 75: CPT | Mod: 25,,, | Performed by: INTERNAL MEDICINE

## 2024-07-24 PROCEDURE — 43259 EGD US EXAM DUODENUM/JEJUNUM: CPT | Performed by: INTERNAL MEDICINE

## 2024-07-24 PROCEDURE — 63600175 PHARM REV CODE 636 W HCPCS: Performed by: HOSPITALIST

## 2024-07-24 PROCEDURE — 25000003 PHARM REV CODE 250: Performed by: PHYSICIAN ASSISTANT

## 2024-07-24 PROCEDURE — 37000009 HC ANESTHESIA EA ADD 15 MINS: Performed by: INTERNAL MEDICINE

## 2024-07-24 PROCEDURE — 80048 BASIC METABOLIC PNL TOTAL CA: CPT | Performed by: PHYSICIAN ASSISTANT

## 2024-07-24 PROCEDURE — 0DJ08ZZ INSPECTION OF UPPER INTESTINAL TRACT, VIA NATURAL OR ARTIFICIAL OPENING ENDOSCOPIC: ICD-10-PCS | Performed by: HOSPITALIST

## 2024-07-24 PROCEDURE — 63600175 PHARM REV CODE 636 W HCPCS: Performed by: PHYSICIAN ASSISTANT

## 2024-07-24 PROCEDURE — 25000003 PHARM REV CODE 250: Performed by: NURSE ANESTHETIST, CERTIFIED REGISTERED

## 2024-07-24 PROCEDURE — 37000008 HC ANESTHESIA 1ST 15 MINUTES: Performed by: INTERNAL MEDICINE

## 2024-07-24 PROCEDURE — 83735 ASSAY OF MAGNESIUM: CPT | Performed by: PHYSICIAN ASSISTANT

## 2024-07-24 PROCEDURE — 63600175 PHARM REV CODE 636 W HCPCS: Performed by: NURSE ANESTHETIST, CERTIFIED REGISTERED

## 2024-07-24 PROCEDURE — 85025 COMPLETE CBC W/AUTO DIFF WBC: CPT | Performed by: PHYSICIAN ASSISTANT

## 2024-07-24 PROCEDURE — 87040 BLOOD CULTURE FOR BACTERIA: CPT | Performed by: HOSPITALIST

## 2024-07-24 RX ORDER — GLUCAGON 1 MG
1 KIT INJECTION
Status: DISCONTINUED | OUTPATIENT
Start: 2024-07-24 | End: 2024-07-24 | Stop reason: HOSPADM

## 2024-07-24 RX ORDER — ONDANSETRON HYDROCHLORIDE 2 MG/ML
INJECTION, SOLUTION INTRAVENOUS
Status: DISCONTINUED | OUTPATIENT
Start: 2024-07-24 | End: 2024-07-24

## 2024-07-24 RX ORDER — ONDANSETRON HYDROCHLORIDE 2 MG/ML
4 INJECTION, SOLUTION INTRAVENOUS EVERY 6 HOURS PRN
Status: DISCONTINUED | OUTPATIENT
Start: 2024-07-24 | End: 2024-07-26

## 2024-07-24 RX ORDER — PROCHLORPERAZINE EDISYLATE 5 MG/ML
5 INJECTION INTRAMUSCULAR; INTRAVENOUS EVERY 30 MIN PRN
Status: DISCONTINUED | OUTPATIENT
Start: 2024-07-24 | End: 2024-07-24 | Stop reason: HOSPADM

## 2024-07-24 RX ORDER — ONDANSETRON HYDROCHLORIDE 2 MG/ML
4 INJECTION, SOLUTION INTRAVENOUS DAILY PRN
Status: DISCONTINUED | OUTPATIENT
Start: 2024-07-24 | End: 2024-07-24 | Stop reason: HOSPADM

## 2024-07-24 RX ORDER — FENTANYL CITRATE 50 UG/ML
INJECTION, SOLUTION INTRAMUSCULAR; INTRAVENOUS
Status: DISCONTINUED | OUTPATIENT
Start: 2024-07-24 | End: 2024-07-24

## 2024-07-24 RX ORDER — PROPOFOL 10 MG/ML
VIAL (ML) INTRAVENOUS
Status: DISCONTINUED | OUTPATIENT
Start: 2024-07-24 | End: 2024-07-24

## 2024-07-24 RX ORDER — LIDOCAINE HYDROCHLORIDE 20 MG/ML
INJECTION, SOLUTION EPIDURAL; INFILTRATION; INTRACAUDAL; PERINEURAL
Status: DISCONTINUED | OUTPATIENT
Start: 2024-07-24 | End: 2024-07-24

## 2024-07-24 RX ORDER — OXYCODONE HYDROCHLORIDE 10 MG/1
10 TABLET ORAL EVERY 6 HOURS PRN
Status: DISCONTINUED | OUTPATIENT
Start: 2024-07-24 | End: 2024-07-29 | Stop reason: HOSPADM

## 2024-07-24 RX ORDER — SUCCINYLCHOLINE CHLORIDE 20 MG/ML
INJECTION INTRAMUSCULAR; INTRAVENOUS
Status: DISCONTINUED | OUTPATIENT
Start: 2024-07-24 | End: 2024-07-24

## 2024-07-24 RX ORDER — POTASSIUM CHLORIDE 7.45 MG/ML
10 INJECTION INTRAVENOUS
Status: DISCONTINUED | OUTPATIENT
Start: 2024-07-24 | End: 2024-07-24

## 2024-07-24 RX ORDER — MIDAZOLAM HYDROCHLORIDE 1 MG/ML
INJECTION INTRAMUSCULAR; INTRAVENOUS
Status: DISCONTINUED | OUTPATIENT
Start: 2024-07-24 | End: 2024-07-24

## 2024-07-24 RX ORDER — DEXAMETHASONE SODIUM PHOSPHATE 4 MG/ML
INJECTION, SOLUTION INTRA-ARTICULAR; INTRALESIONAL; INTRAMUSCULAR; INTRAVENOUS; SOFT TISSUE
Status: DISCONTINUED | OUTPATIENT
Start: 2024-07-24 | End: 2024-07-24

## 2024-07-24 RX ADMIN — LIDOCAINE HYDROCHLORIDE 50 MG: 20 INJECTION, SOLUTION EPIDURAL; INFILTRATION; INTRACAUDAL; PERINEURAL at 02:07

## 2024-07-24 RX ADMIN — FENTANYL CITRATE 50 MCG: 50 INJECTION, SOLUTION INTRAMUSCULAR; INTRAVENOUS at 02:07

## 2024-07-24 RX ADMIN — SODIUM CHLORIDE: 9 INJECTION, SOLUTION INTRAVENOUS at 04:07

## 2024-07-24 RX ADMIN — BICTEGRAVIR SODIUM, EMTRICITABINE, AND TENOFOVIR ALAFENAMIDE FUMARATE 1 TABLET: 50; 200; 25 TABLET ORAL at 08:07

## 2024-07-24 RX ADMIN — HYDROMORPHONE HYDROCHLORIDE 1.5 MG: 1 INJECTION, SOLUTION INTRAMUSCULAR; INTRAVENOUS; SUBCUTANEOUS at 07:07

## 2024-07-24 RX ADMIN — SODIUM CHLORIDE: 9 INJECTION, SOLUTION INTRAVENOUS at 01:07

## 2024-07-24 RX ADMIN — HEPARIN SODIUM 5000 UNITS: 5000 INJECTION INTRAVENOUS; SUBCUTANEOUS at 03:07

## 2024-07-24 RX ADMIN — SUCCINYLCHOLINE CHLORIDE 120 MG: 20 INJECTION, SOLUTION INTRAMUSCULAR; INTRAVENOUS at 02:07

## 2024-07-24 RX ADMIN — MIDAZOLAM HYDROCHLORIDE 2 MG: 2 INJECTION, SOLUTION INTRAMUSCULAR; INTRAVENOUS at 01:07

## 2024-07-24 RX ADMIN — HYDROMORPHONE HYDROCHLORIDE 1.5 MG: 1 INJECTION, SOLUTION INTRAMUSCULAR; INTRAVENOUS; SUBCUTANEOUS at 04:07

## 2024-07-24 RX ADMIN — OXYCODONE HYDROCHLORIDE 10 MG: 10 TABLET ORAL at 01:07

## 2024-07-24 RX ADMIN — OXYCODONE HYDROCHLORIDE 10 MG: 10 TABLET ORAL at 06:07

## 2024-07-24 RX ADMIN — PROMETHAZINE HYDROCHLORIDE 12.5 MG: 25 INJECTION INTRAMUSCULAR; INTRAVENOUS at 10:07

## 2024-07-24 RX ADMIN — PANTOPRAZOLE SODIUM 40 MG: 40 TABLET, DELAYED RELEASE ORAL at 08:07

## 2024-07-24 RX ADMIN — HYDROMORPHONE HYDROCHLORIDE 1.5 MG: 1 INJECTION, SOLUTION INTRAMUSCULAR; INTRAVENOUS; SUBCUTANEOUS at 03:07

## 2024-07-24 RX ADMIN — HYDROMORPHONE HYDROCHLORIDE 1.5 MG: 1 INJECTION, SOLUTION INTRAMUSCULAR; INTRAVENOUS; SUBCUTANEOUS at 08:07

## 2024-07-24 RX ADMIN — PROMETHAZINE HYDROCHLORIDE 25 MG: 25 INJECTION INTRAMUSCULAR; INTRAVENOUS at 01:07

## 2024-07-24 RX ADMIN — IOHEXOL 100 ML: 350 INJECTION, SOLUTION INTRAVENOUS at 09:07

## 2024-07-24 RX ADMIN — POTASSIUM BICARBONATE 50 MEQ: 978 TABLET, EFFERVESCENT ORAL at 03:07

## 2024-07-24 RX ADMIN — HYDROMORPHONE HYDROCHLORIDE 1.5 MG: 1 INJECTION, SOLUTION INTRAMUSCULAR; INTRAVENOUS; SUBCUTANEOUS at 11:07

## 2024-07-24 RX ADMIN — DEXAMETHASONE SODIUM PHOSPHATE 4 MG: 4 INJECTION, SOLUTION INTRAMUSCULAR; INTRAVENOUS at 02:07

## 2024-07-24 RX ADMIN — ONDANSETRON 4 MG: 2 INJECTION INTRAMUSCULAR; INTRAVENOUS at 02:07

## 2024-07-24 RX ADMIN — SODIUM CHLORIDE: 9 INJECTION, SOLUTION INTRAVENOUS at 11:07

## 2024-07-24 RX ADMIN — OXYCODONE HYDROCHLORIDE 10 MG: 10 TABLET ORAL at 10:07

## 2024-07-24 RX ADMIN — PROPOFOL 200 MG: 10 INJECTION, EMULSION INTRAVENOUS at 02:07

## 2024-07-24 NOTE — ANESTHESIA PREPROCEDURE EVALUATION
07/24/2024  Tasia Cardona is a 24 y.o., male.    Pre-operative evaluation for Procedure(s) (LRB):  EGD (ESOPHAGOGASTRODUODENOSCOPY) (N/A)  ULTRASOUND, UPPER GI TRACT, ENDOSCOPIC (N/A)    Tasia Cardona is a 24 y.o. male     Patient Active Problem List   Diagnosis    HIV infection    Polycythemia vera    Syncope    Nausea & vomiting    Normocytic anemia    Mild intermittent asthma without complication    Chronic pancreatitis    Splenic vein thrombosis    Positive CMV IgG serology    Chest pain    Fluid collection of pancreas    Alcohol use disorder    Continuous severe abdominal pain    Hypokalemia    Pancreatic pseudocyst/cyst    Therapeutic opioid-induced constipation (OIC)    Necrotizing pancreatitis    Moderate malnutrition    Bilateral pleural effusion    Chronic pain syndrome    Melena    Acute on chronic pancreatitis    Pain management    Opiate dependence    COVID    Corinne-Chauhan tear    Peripancreatic fluid collection    Pancreatitis    High anion gap metabolic acidosis       Review of patient's allergies indicates:   Allergen Reactions    [place] Swelling    Pecan nut Swelling    Penicillins Hives     Tolerates cephalosporins    Tolerated piperacillin/tazobactam 11/2023    Soy Other (See Comments)    Sulfa (sulfonamide antibiotics) Hives       No current facility-administered medications on file prior to encounter.     Current Outpatient Medications on File Prior to Encounter   Medication Sig Dispense Refill    cxjfncpnj-xpuiolfb-iajcntk ala (BIKTARVY) -25 mg (25 kg or greater) Take 1 tablet by mouth once daily. 30 tablet 2    pantoprazole (PROTONIX) 40 MG tablet Take 1 tablet (40 mg total) by mouth once daily. 30 tablet 0    promethazine (PHENERGAN) 25 MG tablet Take 1 tablet (25 mg total) by mouth every 4 (four) hours. 21 tablet 0       Past Surgical History:   Procedure Laterality Date     ENDOSCOPIC ULTRASOUND OF UPPER GASTROINTESTINAL TRACT N/A 10/23/2023    Procedure: ULTRASOUND, UPPER GI TRACT, ENDOSCOPIC;  Surgeon: Emil Villatoro MD;  [place] [facility] [place] [facility]  Service: Endoscopy;  Laterality: N/A;    ERCP N/A 10/23/2023    Procedure: ERCP (ENDOSCOPIC RETROGRADE CHOLANGIOPANCREATOGRAPHY);  Surgeon: Emil Villatoro MD;  [facility] [facility]);  Service: Endoscopy;  Laterality: N/A;    ESOPHAGOGASTRODUODENOSCOPY N/A 9/18/2023    Procedure: EGD (ESOPHAGOGASTRODUODENOSCOPY);  Surgeon: Kg Cleaning MD;  [facility] [facility]);  Service: Endoscopy;  Laterality: N/A;    ESOPHAGOGASTRODUODENOSCOPY N/A 3/8/2024    Procedure: EGD (ESOPHAGOGASTRODUODENOSCOPY);  Surgeon: Greg Love MD;  [place] [facility]);  Service: Endoscopy;  Laterality: N/A;       Social History     Socioeconomic History    Marital status: Single   Tobacco Use    Smoking status: Former     Types: Cigarettes    Smokeless tobacco: Never   Substance and Sexual Activity    Alcohol use: Not Currently    Drug use: Yes     Types: Marijuana     Social Determinants of Health     Financial Resource Strain: Low Risk  (3/6/2024)    Overall Financial Resource Strain (CARDIA)     Difficulty of Paying Living Expenses: Not very hard   Food Insecurity: No Food Insecurity (1/7/2024)    Hunger Vital Sign     Worried About Running Out of Food in the Last Year: Never true     Ran Out of Food in the Last Year: Never true   Transportation Needs: No Transportation Needs (3/6/2024)    PRAPARE - Transportation     Lack of Transportation (Medical): No     Lack of Transportation (Non-Medical): No   Physical Activity: Sufficiently Active (1/7/2024)    Exercise Vital Sign     Days of Exercise per Week: 3 days     Minutes of Exercise per Session: 60 min   Recent Concern: Physical Activity - Inactive (12/18/2023)    Exercise Vital Sign     Days of Exercise per Week: 0 days     Minutes of Exercise per Session: 0 min   Stress:  "No Stress Concern Present (2024)    [employer] [place] [employer] - Occupational Stress Questionnaire     Feeling of Stress : Not at all   Housing Stability: Low Risk  (3/6/2024)    Housing Stability Vital Sign     Unable to Pay for Housing in the Last Year: No     Number of Places Lived in the Last Year: 1     Unstable Housing in the Last Year: No         CBC:   Recent Labs     24  1548 24  0418   WBC 8.44 6.80   RBC 5.31 4.44*   HGB 13.6* 11.5*   HCT 44.6 38.4*    217   MCV 84 87   MCH 25.6* 25.9*   MCHC 30.5* 29.9*       CMP:   Recent Labs     24  1548 24  0418   * 138   K 3.3* 3.3*    103   CO2 21* 28   BUN 5* 3*   CREATININE 0.8 0.7   GLU 86 80   MG  --  1.9   CALCIUM 9.2 8.6*   ALBUMIN 3.7  --    PROT 7.9  --    ALKPHOS 167*  --    ALT 17  --    AST 20  --    BILITOT 0.9  --        INR  No results for input(s): "PT", "INR", "PROTIME", "APTT" in the last 72 hours.        Diagnostic Studies:      EKD Echo:  No results found for this or any previous visit.        Pre-op Assessment    I have reviewed the Patient Summary Reports.     I have reviewed the Nursing Notes. I have reviewed the NPO Status.   I have reviewed the Medications.     Review of Systems  Anesthesia Hx:  No problems with previous Anesthesia                Social:  Smoker, No Alcohol Use       Cardiovascular:  Exercise tolerance: good                                           Pulmonary:    Asthma mild                   Hepatic/GI:         chronic pancreatitis          Endocrine:  Endocrine Normal                Physical Exam  General: Well nourished, Cooperative and Alert    Airway:  Mallampati: I   Mouth Opening: Normal  TM Distance: Normal  Tongue: Normal  Neck ROM: Normal ROM    Dental:    Chest/Lungs:  Normal Respiratory Rate    Heart:  Rate: Normal        Anesthesia Plan  Type of Anesthesia, risks & benefits discussed:    Anesthesia Type: Gen ETT  Intra-op Monitoring Plan: " Standard ASA Monitors  Post Op Pain Control Plan: multimodal analgesia and IV/PO Opioids PRN  Induction:  IV and rapid sequence  Airway Plan: Direct, Post-Induction  Informed Consent: Informed consent signed with the Patient and all parties understand the risks and agree with anesthesia plan.  All questions answered.   ASA Score: 3  Day of Surgery Review of History & Physical: H&P Update referred to the surgeon/provider.    Ready For Surgery From Anesthesia Perspective.     .

## 2024-07-24 NOTE — H&P
"[facility] - Emergency Dept  [facility] Medicine  History & Physical    Patient Name: Tasia Cardona  MRN: [id number]  Patient Class: IP- Inpatient  Admission Date: 7/23/2024  Attending Physician: Dakotah Hannah MD   Primary Care Provider: Pina Jones NP         Patient information was obtained from patient, past medical records, and ER records.     Subjective:     Principal Problem:Acute on chronic pancreatitis    Chief Complaint:   Chief Complaint   Patient presents with    Abdominal Pain     Hx pancreatitis         HPI: Mr. Tasia Cardona is a 24 y.o. male, with PMH of chronic pancreatitis, necrotizing pancreatitis, pancreatic pseudocyst, alcohol use disorder, opioid dependence with opoid induced constipation, Corinne-Chauhan tear, HIV, asthma, who presented to [facility] ED on 7/23/24 due to epigastric abdominal pain beginning the morning of presentation. He notes the pain is occasionally "sharp and squeezing" in nature, and radiates to the LLQ. Associated symptoms include nausea, vomiting, chills, He denied UTI symptoms, constipation ,diarrhea. He states he was scheduled for EGD this Friday. Reports he has not drank alcohol x 30 days. He was evaluated in the ED with labs showing no leukocytosis or left shift on CBC.  Metabolic panel showed potassium 3.3, with CO2 21, anion gap 12 0, and ALP of 167 on metabolic panel.  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 He was treated in the ED with 1L LR, potassium, zofran, bentyl, morphine, and dilaudid. He was admitted to inpatient status.     Past Medical History:   Diagnosis Date    Acute necrotizing pancreatitis 09/20/2023    Alcohol use disorder 10/05/2023    Asthma     Hepatitis C antibody positive in blood " 09/18/2023 9/2023 PCR negative    HIV infection 10/2021    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       Past Surgical History:   Procedure Laterality Date    ENDOSCOPIC ULTRASOUND OF UPPER GASTROINTESTINAL TRACT N/A 10/23/2023    Procedure: ULTRASOUND, UPPER GI TRACT, ENDOSCOPIC;  Surgeon: Emil Villtaoro MD;  [facility]  Service: Endoscopy;  Laterality: N/A;    ERCP N/A 10/23/2023    Procedure: ERCP (ENDOSCOPIC RETROGRADE CHOLANGIOPANCREATOGRAPHY);  Surgeon: Emil Villatoro MD;  [facility]  Service: Endoscopy;  Laterality: N/A;    ESOPHAGOGASTRODUODENOSCOPY N/A 9/18/2023    Procedure: EGD (ESOPHAGOGASTRODUODENOSCOPY);  Surgeon: Kg Cleaning MD;  [facility]  Service: Endoscopy;  Laterality: N/A;    ESOPHAGOGASTRODUODENOSCOPY N/A 3/8/2024    Procedure: EGD (ESOPHAGOGASTRODUODENOSCOPY);  Surgeon: Greg Love MD;  [facility]  Service: Endoscopy;  Laterality: N/A;       Review of patient's allergies indicates:   Allergen Reactions    [place] Swelling    Pecan nut Swelling    Penicillins Hives     Tolerates cephalosporins    Tolerated piperacillin/tazobactam 11/2023    Soy Other (See Comments)    Sulfa (sulfonamide antibiotics) Hives       No current facility-administered medications on file prior to encounter.     Current Outpatient Medications on File Prior to Encounter   Medication Sig    qxnzpuzyp-uwqjlaih-ogkeigy ala (BIKTARVY) -25 mg (25 kg or greater) Take 1 tablet by mouth once daily.    pantoprazole (PROTONIX) 40 MG tablet Take 1 tablet (40 mg total) by mouth once daily.    promethazine (PHENERGAN) 25 MG tablet Take 1 tablet (25 mg total) by mouth every 4 (four) hours.     Family History       Problem Relation  (Age of Onset)    Breast cancer Maternal Grandmother    Cancer Mother, Brother    No Known Problems Father    Ovarian cancer Mother    Pancreatitis Mother          Tobacco Use    Smoking status: Former     Types: Cigarettes    Smokeless tobacco: Never   Substance and Sexual Activity    Alcohol use: Not Currently    Drug use: Never    Sexual activity: Not on file     Review of Systems   Constitutional:  Positive for chills and diaphoresis. Negative for activity change, appetite change and fever (Tmax 101F).   Respiratory:  Negative for cough, shortness of breath and wheezing.    Cardiovascular:  Negative for chest pain and palpitations.   Gastrointestinal:  Positive for abdominal pain and nausea. Negative for constipation, diarrhea and vomiting.   Genitourinary:  Negative for decreased urine volume, difficulty urinating, dysuria, frequency, hematuria and urgency.   Musculoskeletal:  Negative for back pain, neck pain and neck stiffness.   Skin:  Negative for color change and pallor.   Neurological:  Negative for dizziness, syncope, weakness, light-headedness and headaches.   Hematological:  Does not bruise/bleed easily.   Psychiatric/Behavioral:  Negative for agitation and confusion.      Objective:     Vital Signs (Most Recent):  Temp: 98.3 °F (36.8 °C) (07/23/24 2347)  Pulse: 98 (07/23/24 2347)  Resp: 18 (07/23/24 2347)  BP: 127/82 (07/23/24 2347)  SpO2: 99 % (07/23/24 2347) Vital Signs (24h Range):  Temp:  [98 °F (36.7 °C)-99.3 °F (37.4 °C)] 98.3 °F (36.8 °C)  Pulse:  [84-98] 98  Resp:  [16-18] 18  SpO2:  [98 %-99 %] 99 %  BP: (124-132)/(78-83) 127/82     Weight: 65.8 kg (145 lb)  Body mass index is 20.81 kg/m².     Physical Exam  Vitals and nursing note reviewed.   Constitutional:       General: He is not in acute distress.     Appearance: Normal appearance. He is well-developed and normal weight. He is not ill-appearing, toxic-appearing or diaphoretic.   HENT:      Head: Normocephalic and atraumatic.      Right  Ear: External ear normal.      Left Ear: External ear normal.   Eyes:      General: No scleral icterus.        Right eye: No discharge.         Left eye: No discharge.      Conjunctiva/sclera: Conjunctivae normal.   Neck:      Vascular: No JVD.      Trachea: No tracheal deviation.   Cardiovascular:      Rate and Rhythm: Normal rate and regular rhythm.      Heart sounds: Normal heart sounds. No murmur heard.     No gallop.   Pulmonary:      Effort: Pulmonary effort is normal. No respiratory distress.      Breath sounds: Normal breath sounds. No stridor. No wheezing or rales.   Abdominal:      General: Bowel sounds are normal. There is no distension.      Palpations: Abdomen is soft. There is no mass.      Tenderness: There is no abdominal tenderness (upper abdomen). There is no guarding.   Musculoskeletal:         General: No deformity. Normal range of motion.      Cervical back: Normal range of motion and neck supple.   Skin:     General: Skin is warm and dry.   Neurological:      General: No focal deficit present.      Mental Status: He is alert and oriented to person, place, and time.      Cranial Nerves: No cranial nerve deficit.      Motor: No abnormal muscle tone.      Coordination: Coordination normal.   Psychiatric:         Mood and Affect: Mood normal.         Behavior: Behavior normal.         Thought Content: Thought content normal.         Judgment: Judgment normal.                Significant Labs: All pertinent labs within the past 24 hours have been reviewed.  BMP:   Recent Labs   Lab 07/23/24  1548   GLU 86   *   K 3.3*      CO2 21*   BUN 5*   CREATININE 0.8   CALCIUM 9.2     CBC:   Recent Labs   Lab 07/23/24  1548   WBC 8.44   HGB 13.6*   HCT 44.6        CMP:   Recent Labs   Lab 07/23/24  1548   *   K 3.3*      CO2 21*   GLU 86   BUN 5*   CREATININE 0.8   CALCIUM 9.2   PROT 7.9   ALBUMIN 3.7   BILITOT 0.9   ALKPHOS 167*   AST 20   ALT 17   ANIONGAP 12     Urine Culture:  "No results for input(s): "LABURIN" in the last 48 hours.  Urine Studies: No results for input(s): "COLORU", "APPEARANCEUA", "PHUR", "SPECGRAV", "PROTEINUA", "GLUCUA", "KETONESU", "BILIRUBINUA", "OCCULTUA", "NITRITE", "UROBILINOGEN", "LEUKOCYTESUR", "RBCUA", "WBCUA", "BACTERIA", "SQUAMEPITHEL", "HYALINECASTS" in the last 48 hours.    Invalid input(s): "WRIGHTSUR"    Significant Imaging: I have reviewed all pertinent imaging results/findings within the past 24 hours.  Imaging Results              CT Abdomen Pelvis With IV Contrast NO Oral Contrast (Final result)  Result time 07/23/24 18:38:56      Final result by Dakotah Tejada MD (07/23/24 18:38:56)                   Impression:      1. Findings suggesting sequela of mild acute pancreatitis.  New versus interval enlargement/organization of previous peripancreatic collection along the anterior upper aspect of the pancreas.  Abscess considered less likely but not entirely excluded.  Previous additional peripancreatic collections have resolved.  2. Resolution of previous lower esophageal wall thickening.  3. Hepatosplenomegaly.  4. 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  5. Additional findings as detailed above.      Electronically signed by: Dakotah Tejada MD  Date:    07/23/2024  Time:    18:38               Narrative:    EXAMINATION:  CT ABDOMEN PELVIS WITH IV CONTRAST    CLINICAL HISTORY:  Epigastric pain;    TECHNIQUE:  Low dose axial images, sagittal and coronal reformations were obtained from the lung bases to the pubic symphysis following the IV administration of 75 mL of Omnipaque 350 .  Oral contrast was not given.    COMPARISON:  Right upper quadrant ultrasound 03/08/2024, CT abdomen and pelvis 03/04/2024    FINDINGS:  No consolidation or pleural effusion at the imaged lung bases.  Base of the heart is within normal limits.    No significant biliary ductal dilatation.  Liver and spleen are both enlarged without focal " process.  Portal veins are patent.  Chronic splenic vein thrombosis with left upper quadrant collateral vessels similar to prior.    Subtle fat stranding about the pancreatic head and neck, although to a lesser degree from 03/04/2024 study.  Previous pancreatic collection along the anterior and superior aspect of the pancreatic head, neck and proximal body appears larger and more organized now measuring approximately 3.8 x 6.8 cm.  No associated internal gas foci.  Previous additional several peripancreatic collections have resolved.  No enhancing pancreatic mass.  No pancreatic ductal dilatation or parenchymal calcifications.    Stomach, duodenum and bilateral adrenal glands are within normal limits.    Bilateral kidneys are normal in size, shape and location with symmetric normal enhancement.  No hydronephrosis or significant perinephric stranding.  Retroaortic left renal vein.  Ureters are normal in course and caliber.  Urinary bladder is well distended without wall thickening.  Prostate and seminal vesicles are within normal limits.  Few scattered small pelvic phleboliths noted.    Appendix and terminal ileum are within normal limits.  No evidence of bowel obstruction or acute bowel inflammation.  No pneumatosis or portal venous gas.    No ascites, free air or lymphadenopathy by CT criteria.    No significant atheromatous disease.  No aortic aneurysm or dissection.    Extraperitoneal soft tissues are within normal limits.  Osseous structures are within normal limits.                                       Assessment/Plan:     * Acute on chronic pancreatitis  - Mr. Tasia Cardona presents with acute worsening of chronic pancreatitis   - he has h/o alcohol use, denies use x 30 days   - ED labs without elevated lipase   - CT shows mild acute pancreatitis with new/interval enlargement of prior peripancreatic collection along anterior upper aspect of pancreas  - NPO at [facility]    - consulting IR for drainage of fluid  "collection   - IV fluids for hydration  - PRN meds for pain/nausea      Peripancreatic fluid collection  - as above in "Acute on Chronic pancreatitis"       Alcohol use disorder  - states no use x 30 days   - monitor, will order CIWA scales       Splenic vein thrombosis  - history noted   - CT today re demonstrates this finding      Mild intermittent asthma without complication  - chronic, stable   - monitor   - PRN DuoNebs if needed       HIV infection  - continue home meds   - CD4 1080 10/2023  - follows w/ Infectious Disease (Dr. Velasco)         VTE Risk Mitigation (From admission, onward)           Ordered     heparin (porcine) injection 5,000 Units  Every 8 hours         07/23/24 2058     IP VTE HIGH RISK PATIENT  Once         07/23/24 2058     Place sequential compression device  Until discontinued         07/23/24 2058                                    Jennyfer Mckinley PA-C  Department of Hospital Medicine  [facility] - Emergency Dept          "

## 2024-07-24 NOTE — ED NOTES
Pt returned from endoscopy, he transferred to bed 2, calm and cooperative. Pt has a SL in his rt FA. Pt c/o abd pain and discomfort, prn medications given.

## 2024-07-24 NOTE — ANESTHESIA PROCEDURE NOTES
Intubation    Date/Time: 7/24/2024 2:03 PM    Performed by: Abel Hwang CRNA  Authorized by: Vidal Solo MD    Intubation:     Induction:  Rapid sequence induction    Intubated:  Postinduction    Mask Ventilation:  Not attempted    Attempts:  1    Attempted By:  Student    Method of Intubation:  Video laryngoscopy    Blade:  Gomez 3    Laryngeal View Grade: Grade I - full view of cords      Difficult Airway Encountered?: No      Complications:  None    Airway Device:  Oral endotracheal tube    Airway Device Size:  7.5    Style/Cuff Inflation:  Cuffed (inflated to minimal occlusive pressure)    Tube secured:  22    Secured at:  The lips    Placement Verified By:  Capnometry    Complicating Factors:  None    Findings Post-Intubation:  BS equal bilateral and atraumatic/condition of teeth unchanged

## 2024-07-24 NOTE — NURSING
Pt had an uneventful night. Pt AAOx4, respirations even and unlabored, no acute distress, no complaints of pain. Safety precautions in place,call light in reach. Pt had his CHG bath for procedure Pt has been NPO since midnight. Pt pending IR procedure. Will give report to oncoming nurse.

## 2024-07-24 NOTE — PROGRESS NOTES
"[facility] - Emergency Dept  Hospital Medicine  Progress Note    Patient Name: Tasia Cardona  MRN: [id number]  Patient Class: IP- Inpatient   Admission Date: 7/23/2024  Length of Stay: 1 days  Attending Physician: Janey Cho MD  Primary Care Provider: Pina Jones NP        Subjective:     Principal Problem:Acute on chronic pancreatitis        HPI:  Mr. Tasia Cardona is a 24 y.o. male, with PMH of chronic pancreatitis, necrotizing pancreatitis, pancreatic pseudocyst, alcohol use disorder, opioid dependence with opoid induced constipation, Corinne-Chauhan tear, HIV, asthma, who presented to [facility] ED on 7/23/24 due to epigastric abdominal pain beginning the morning of presentation. He notes the pain is occasionally "sharp and squeezing" in nature, and radiates to the LLQ. Associated symptoms include nausea, vomiting, chills, He denied UTI symptoms, constipation ,diarrhea. He states he was scheduled for EGD this Friday. Reports he has not drank alcohol x 30 days. He was evaluated in the ED with labs showing no leukocytosis or left shift on CBC.  Metabolic panel showed potassium 3.3, with CO2 21, anion gap 12 0, and ALP of 167 on metabolic panel.  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 He was treated in the ED with 1L LR, potassium, zofran, bentyl, morphine, and dilaudid. He was admitted to inpatient status.     Overview/Hospital Course:  7/24 - Reported feer and night sweats at home PTA.   Not on antibiotics yet. Blood culture ordered.  No SIRS since admission.   Per IR, The collection is deep and surrounded by varices so there is not a good percutaneous window for drainage.  AES consulted to see if they can offer another approach or continue to follow it " and monitor.  He is going to procedure today. I discussed what to expect.  Blood pressure 139/77, VSS. H/h stable,- 11.5; K 3.5    Interval History: see above    Review of Systems   Constitutional:  Positive for activity change, appetite change, chills and diaphoresis. Negative for fever.   HENT:  Negative for trouble swallowing.    Respiratory:  Negative for cough and shortness of breath.    Cardiovascular:  Negative for chest pain and leg swelling.   Gastrointestinal:  Positive for abdominal pain. Negative for constipation, diarrhea, nausea and vomiting.   Genitourinary:  Negative for difficulty urinating and hematuria.   Musculoskeletal:  Negative for arthralgias, back pain and neck stiffness.   Neurological:  Negative for headaches.   Psychiatric/Behavioral:  Negative for agitation, behavioral problems, confusion and decreased concentration.      Objective:     Vital Signs (Most Recent):  Temp: 98.9 °F (37.2 °C) (07/24/24 0625)  Pulse: 84 (07/24/24 0625)  Resp: 16 (07/24/24 0625)  BP: 139/77 (07/24/24 0625)  SpO2: 95 % (07/24/24 0401) Vital Signs (24h Range):  Temp:  [98 °F (36.7 °C)-99.3 °F (37.4 °C)] 98.9 °F (37.2 °C)  Pulse:  [69-98] 84  Resp:  [16-18] 16  SpO2:  [95 %-99 %] 95 %  BP: (123-139)/(70-83) 139/77     Weight: 65.8 kg (145 lb)  Body mass index is 20.81 kg/m².  No intake or output data in the 24 hours ending 07/24/24 0732      Physical Exam  Constitutional:       General: He is not in acute distress.     Appearance: Normal appearance. He is not ill-appearing, toxic-appearing or diaphoretic.   HENT:      Head: Normocephalic and atraumatic.      Nose: Nose normal.      Mouth/Throat:      Mouth: Mucous membranes are moist.      Pharynx: Oropharynx is clear.   Eyes:      General: No scleral icterus.     Extraocular Movements: Extraocular movements intact.      Conjunctiva/sclera: Conjunctivae normal.      Pupils: Pupils are equal, round, and reactive to light.   Cardiovascular:      Rate and Rhythm:  Normal rate and regular rhythm.      Pulses: Normal pulses.      Heart sounds: Normal heart sounds. No murmur heard.  Pulmonary:      Effort: Pulmonary effort is normal. No respiratory distress.      Breath sounds: Normal breath sounds. No stridor. No wheezing, rhonchi or rales.   Chest:      Chest wall: No tenderness.   Abdominal:      General: Abdomen is flat. Bowel sounds are normal. There is distension.      Palpations: Abdomen is soft. There is no mass.      Tenderness: There is abdominal tenderness (diffuse mid epigastrum). There is guarding. There is no right CVA tenderness or rebound.   Musculoskeletal:         General: No swelling, tenderness, deformity or signs of injury. Normal range of motion.      Cervical back: Normal range of motion and neck supple. No rigidity or tenderness.      Right lower leg: No edema.      Left lower leg: No edema.   Skin:     General: Skin is warm and dry.      Coloration: Skin is not jaundiced.      Findings: No erythema or rash.   Neurological:      General: No focal deficit present.      Mental Status: He is alert and oriented to person, place, and time. Mental status is at baseline.      Motor: No weakness.   Psychiatric:         Mood and Affect: Mood normal.         Behavior: Behavior normal.         Thought Content: Thought content normal.         Judgment: Judgment normal.             Significant Labs: All pertinent labs within the past 24 hours have been reviewed.  CBC:   Recent Labs   Lab 07/23/24  1548 07/24/24  0418   WBC 8.44 6.80   HGB 13.6* 11.5*   HCT 44.6 38.4*    217     CMP:   Recent Labs   Lab 07/23/24  1548 07/24/24  0418   * 138   K 3.3* 3.3*    103   CO2 21* 28   GLU 86 80   BUN 5* 3*   CREATININE 0.8 0.7   CALCIUM 9.2 8.6*   PROT 7.9  --    ALBUMIN 3.7  --    BILITOT 0.9  --    ALKPHOS 167*  --    AST 20  --    ALT 17  --    ANIONGAP 12 7*       Significant Imaging: I have reviewed all pertinent imaging results/findings within the past  "24 hours.    Assessment/Plan:      * Acute on chronic pancreatitis  - Mr. Tasia Cardona presents with acute worsening of chronic pancreatitis   - he has h/o alcohol use, denies use x 30 days   - ED labs without elevated lipase   - CT shows mild acute pancreatitis with new/interval enlargement of prior peripancreatic collection along anterior upper aspect of pancreas  - NPO at [place]    - consulting IR for drainage of fluid collection   - IV fluids for hydration  - PRN meds for pain/nausea    7/24- IR is unable to approach fluid collection. There are surrounding varicies. Consider AES or surgery consult if needed.       Peripancreatic fluid collection  - as above in "Acute on Chronic pancreatitis"       Alcohol use disorder  - states no use x 30 days   - monitor, will order CIWA scales       Mild intermittent asthma without complication  - chronic, stable   - monitor   - PRN DuoNebs if needed       Splenic vein thrombosis  - history noted   - CT today re demonstrates this finding      HIV infection  - continue home meds   - CD4 1080 10/2023  - follows w/ Infectious Disease (Dr. Velasco)       Pain management  Hx of opioid dependence with complicates pain management  On tylenol, oxycodone, dilaudid iv      Hypokalemia  Patient has hypokalemia which is Acute and currently worsening. Most recent potassium levels reviewed-   Lab Results   Component Value Date    K 3.3 (L) 07/24/2024   . Will continue potassium replacement per protocol and recheck repeat levels after replacement completed.     Repleted 7/23, 7/24      VTE Risk Mitigation (From admission, onward)           Ordered     heparin (porcine) injection 5,000 Units  Every 8 hours         07/23/24 2058     IP VTE HIGH RISK PATIENT  Once         07/23/24 2058     Place sequential compression device  Until discontinued         07/23/24 2058                    Discharge Planning   CASTILLO:      Code Status: Full Code   Is the patient medically ready for discharge?:     Reason " for patient still in hospital (select all that apply): Patient trending condition           Janey Cho MD  Department of Hospital Medicine   [facility] - Emergency Dept

## 2024-07-24 NOTE — CONSULTS
[facility]  Advanced Endoscopy Service  Consult Note    Patient Name: Tasia Cardona  MRN: [id number]  Admission Date: 7/23/2024  Hospital Length of Stay: 1 days  Code Status: Full Code   Attending Provider: Janey Cho MD   Consulting Provider: Roxy Moon MD  Primary Care Physician: Pina Jones NP  Principal Problem:Acute on chronic pancreatitis    Inpatient consult to Advanced Endoscopy Service (AES)  Consult performed by: Roxy Moon MD  Consult ordered by: Janey Cho MD        Subjective:     HPI: Tasia Cardona is a 24 y.o. male with history of HIV, alcohol use disorder, opioid dependence, extensive GI issues (chronic pancreatitis, necrotizing pancreatitis, pancreatic pseudocyst, Corinne-Chauhan tear, opioid induced constipation) who presented to [facility] ED on 7/23/24 due to epigastric abdominal pain beginning the morning of presentation. Reports his last drink was 30 days ago.  He was evaluated in the ED with labs showing no leukocytosis or left shift on CBC. CT of the abdomen and pelvis showed mild acute pancreatitis with new verses enlargement of a prior peripancreatic fluid collection along the anterior upper aspect of the pancreas, abscess not excluded, and chronic splenic vein thrombosis with collateral vessels in upper abdomen. IR consulted to drain fluid collection but unable due to surrounding vessels. AES has been consulted for further recommendations.  Last admitted to hospital and seen by our service in 3/2024.       Past Medical History:   Diagnosis Date    Acute necrotizing pancreatitis 09/20/2023    Alcohol use disorder 10/05/2023    Asthma     Hepatitis C antibody positive in blood 09/18/2023 9/2023 PCR negative    HIV infection 10/2021    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/2023    Normocytic anemia 09/18/2023    Pancreatic pseudocyst/cyst 10/24/2023    Polycythemia vera 11/28/2021    Receives phlebotomy if Hct>45    Positive CMV IgG  serology 09/21/2023    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       Past Surgical History:   Procedure Laterality Date    ENDOSCOPIC ULTRASOUND OF UPPER GASTROINTESTINAL TRACT N/A 10/23/2023    Procedure: ULTRASOUND, UPPER GI TRACT, ENDOSCOPIC;  Surgeon: Emil Villatoro MD;  Location: [facility] (2ND FLR);  Service: Endoscopy;  Laterality: N/A;    ERCP N/A 10/23/2023    Procedure: ERCP (ENDOSCOPIC RETROGRADE CHOLANGIOPANCREATOGRAPHY);  Surgeon: Emil Villatoro MD;  Location: [facility] ([facility]  Service: Endoscopy;  Laterality: N/A;    ESOPHAGOGASTRODUODENOSCOPY N/A 9/18/2023    Procedure: EGD (ESOPHAGOGASTRODUODENOSCOPY);  Surgeon: Kg Cleaning MD;  Location: [facility] (2ND FLR);  Service: Endoscopy;  Laterality: N/A;    ESOPHAGOGASTRODUODENOSCOPY N/A 3/8/2024    Procedure: EGD (ESOPHAGOGASTRODUODENOSCOPY);  Surgeon: Greg Love MD;  Location: [facility] ([facility]);  Service: Endoscopy;  Laterality: N/A;       Family History   Problem Relation Name Age of Onset    Pancreatitis Mother      Cancer Mother      Ovarian cancer Mother      No Known Problems Father      Cancer Brother      Breast cancer Maternal Grandmother         Social History     Socioeconomic History    Marital status: Single   Tobacco Use    Smoking status: Former     Types: Cigarettes    Smokeless tobacco: Never   Substance and Sexual Activity    Alcohol use: Not Currently    Drug use: Never     Social Determinants of Health     Financial Resource Strain: Low Risk  (3/6/2024)    Overall Financial Resource Strain (CARDIA)     Difficulty of Paying Living Expenses: Not very hard   Food Insecurity: No Food Insecurity (1/7/2024)    Hunger Vital Sign     Worried About Running Out of Food in the Last Year: Never true     Ran Out of Food in the Last Year: Never true   Transportation Needs: No Transportation Needs (3/6/2024)    PRAPARE - Transportation     Lack of Transportation (Medical): No     Lack of Transportation (Non-Medical): No   Physical  Activity: Sufficiently Active (1/7/2024)    Exercise Vital Sign     Days of Exercise per Week: 3 days     Minutes of Exercise per Session: 60 min   Recent Concern: Physical Activity - Inactive (12/18/2023)    Exercise Vital Sign     Days of Exercise per Week: 0 days     Minutes of Exercise per Session: 0 min   Stress: No Stress Concern Present (1/7/2024)    [employer] - Occupational Stress Questionnaire     Feeling of Stress : Not at all   Housing Stability: Low Risk  (3/6/2024)    Housing Stability Vital Sign     Unable to Pay for Housing in the Last Year: No     Number of Places Lived in the Last Year: 1     Unstable Housing in the Last Year: No       No current facility-administered medications on file prior to encounter.     Current Outpatient Medications on File Prior to Encounter   Medication Sig Dispense Refill    rftffwdqx-jugistkd-fpfxvcv ala (BIKTARVY) -25 mg (25 kg or greater) Take 1 tablet by mouth once daily. 30 tablet 2    pantoprazole (PROTONIX) 40 MG tablet Take 1 tablet (40 mg total) by mouth once daily. 30 tablet 0    promethazine (PHENERGAN) 25 MG tablet Take 1 tablet (25 mg total) by mouth every 4 (four) hours. 21 tablet 0       Review of patient's allergies indicates:   Allergen Reactions    [place] Swelling    Pecan nut Swelling    Penicillins Hives     Tolerates cephalosporins    Tolerated piperacillin/tazobactam 11/2023    Soy Other (See Comments)    Sulfa (sulfonamide antibiotics) Hives       ROS - negative except for otherwise stated in HPI      Objective:     Vitals:    07/24/24 0733   BP: 138/89   Pulse: 101   Resp: 16   Temp: 98.4 °F (36.9 °C)         Physical Exam:  Constitutional:  not in acute distress and well developed  HENT: Head: Normal, normocephalic.  Eyes: conjunctiva clear and sclera nonicteric  Cardiovascular: 2+ distal pulses  Respiratory: normal chest expansion & respiratory effort and no accessory muscle use  GI: soft, voluntary guarding,  epigastric TTP  Skin: normal color on visualized skin  Neurological: alert, oriented x3  Psychiatric: mood and affect are within normal limits, pt is a good historian; no memory problems were noted      Significant Labs:  Recent Labs   Lab 07/23/24  1548 07/24/24  0418   HGB 13.6* 11.5*       Lab Results   Component Value Date    WBC 6.80 07/24/2024    HGB 11.5 (L) 07/24/2024    HCT 38.4 (L) 07/24/2024    MCV 87 07/24/2024     07/24/2024       Lab Results   Component Value Date     07/24/2024    K 3.3 (L) 07/24/2024     07/24/2024    CO2 28 07/24/2024    BUN 3 (L) 07/24/2024    CREATININE 0.7 07/24/2024    CALCIUM 8.6 (L) 07/24/2024    ANIONGAP 7 (L) 07/24/2024    ESTGFRAFRICA >60.0 07/27/2022    EGFRNONAA >60.0 07/27/2022       Lab Results   Component Value Date    ALT 17 07/23/2024    AST 20 07/23/2024    GGT 61 (H) 10/06/2023    ALKPHOS 167 (H) 07/23/2024    BILITOT 0.9 07/23/2024       Lab Results   Component Value Date    INR 1.1 12/13/2023    INR 1.2 11/28/2023    INR 1.1 11/20/2023       Significant Imaging:  Reviewed pertinent radiology findings.       Assessment/Plan:     Tasia Cardona is a 24 y.o. male with h/o HIV, PCV with splenic vein thrombosis not on AC, asthma, necrotizing pancreatitis presented to [facility] on 7/23 with abdominal pain.     AES consulted for pancreatic fluid collection     No evidence of gastric distention to indicate fluid collection is obstructing or any leukocytosis to indicate infection    Problem List:  Chronic pancreatitis with fluid collections     Recommendations:    -Given pt scheduled to have EGD and EUS on Friday, will plan to add on for today while inpt   -Keep NPO, hold AC  -Manage pancreatitis with IV fluids and pain management  -Further recs pending results of procedure   -Discussed with team   -Rest per primary     Thank you for involving us in the care of Tasia Cardona. Please call with any additional questions, concerns or changes in the patient's  clinical status.      Roxy Moon MD  Gastroenterology Fellow PGY IV  [facility]

## 2024-07-24 NOTE — ASSESSMENT & PLAN NOTE
- Mr. Tasia Cardona presents with acute worsening of chronic pancreatitis   - he has h/o alcohol use, denies use x 30 days   - ED labs without elevated lipase   - CT shows mild acute pancreatitis with new/interval enlargement of prior peripancreatic collection along anterior upper aspect of pancreas  - NPO at [place]    - consulting IR for drainage of fluid collection   - IV fluids for hydration  - PRN meds for pain/nausea

## 2024-07-24 NOTE — HPI
"Mr. Tasia Cardona is a 24 y.o. male, with PMH of chronic pancreatitis, necrotizing pancreatitis, pancreatic pseudocyst, alcohol use disorder, opioid dependence with opoid induced constipation, Corinne-Chauhan tear, HIV, asthma, who presented to [facility] ED on 7/23/24 due to epigastric abdominal pain beginning the morning of presentation. He notes the pain is occasionally "sharp and squeezing" in nature, and radiates to the LLQ. Associated symptoms include nausea, vomiting, chills, He denied UTI symptoms, constipation ,diarrhea. He states he was scheduled for EGD this Friday. Reports he has not drank alcohol x 30 days. He was evaluated in the ED with labs showing no leukocytosis or left shift on CBC.  Metabolic panel showed potassium 3.3, with CO2 21, anion gap 12 0, and ALP of 167 on metabolic panel.  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 He was treated in the ED with 1L LR, potassium, zofran, bentyl, morphine, and dilaudid. He was admitted to inpatient status.   "

## 2024-07-24 NOTE — CARE UPDATE
I have reviewed the chart of Tasia Cardona who is hospitalized for the following:    Active Hospital Problems    Diagnosis    *Acute on chronic pancreatitis    Peripancreatic fluid collection    Hypokalemia     POA, K 3.3  Needs daily chemistry       Alcohol use disorder    Splenic vein thrombosis    Mild intermittent asthma without complication    HIV infection        KIN DeyC  [facility] JOSE

## 2024-07-24 NOTE — SUBJECTIVE & OBJECTIVE
Interval History: see above    Review of Systems   Constitutional:  Positive for activity change, appetite change, chills and diaphoresis. Negative for fever.   HENT:  Negative for trouble swallowing.    Respiratory:  Negative for cough and shortness of breath.    Cardiovascular:  Negative for chest pain and leg swelling.   Gastrointestinal:  Positive for abdominal pain. Negative for constipation, diarrhea, nausea and vomiting.   Genitourinary:  Negative for difficulty urinating and hematuria.   Musculoskeletal:  Negative for arthralgias, back pain and neck stiffness.   Neurological:  Negative for headaches.   Psychiatric/Behavioral:  Negative for agitation, behavioral problems, confusion and decreased concentration.      Objective:     Vital Signs (Most Recent):  Temp: 98.9 °F (37.2 °C) (07/24/24 0625)  Pulse: 84 (07/24/24 0625)  Resp: 16 (07/24/24 0625)  BP: 139/77 (07/24/24 0625)  SpO2: 95 % (07/24/24 0401) Vital Signs (24h Range):  Temp:  [98 °F (36.7 °C)-99.3 °F (37.4 °C)] 98.9 °F (37.2 °C)  Pulse:  [69-98] 84  Resp:  [16-18] 16  SpO2:  [95 %-99 %] 95 %  BP: (123-139)/(70-83) 139/77     Weight: 65.8 kg (145 lb)  Body mass index is 20.81 kg/m².  No intake or output data in the 24 hours ending 07/24/24 0732      Physical Exam  Constitutional:       General: He is not in acute distress.     Appearance: Normal appearance. He is not ill-appearing, toxic-appearing or diaphoretic.   HENT:      Head: Normocephalic and atraumatic.      Nose: Nose normal.      Mouth/Throat:      Mouth: Mucous membranes are moist.      Pharynx: Oropharynx is clear.   Eyes:      General: No scleral icterus.     Extraocular Movements: Extraocular movements intact.      Conjunctiva/sclera: Conjunctivae normal.      Pupils: Pupils are equal, round, and reactive to light.   Cardiovascular:      Rate and Rhythm: Normal rate and regular rhythm.      Pulses: Normal pulses.      Heart sounds: Normal heart sounds. No murmur heard.  Pulmonary:       Effort: Pulmonary effort is normal. No respiratory distress.      Breath sounds: Normal breath sounds. No stridor. No wheezing, rhonchi or rales.   Chest:      Chest wall: No tenderness.   Abdominal:      General: Abdomen is flat. Bowel sounds are normal. There is distension.      Palpations: Abdomen is soft. There is no mass.      Tenderness: There is abdominal tenderness (diffuse mid epigastrum). There is guarding. There is no right CVA tenderness or rebound.   Musculoskeletal:         General: No swelling, tenderness, deformity or signs of injury. Normal range of motion.      Cervical back: Normal range of motion and neck supple. No rigidity or tenderness.      Right lower leg: No edema.      Left lower leg: No edema.   Skin:     General: Skin is warm and dry.      Coloration: Skin is not jaundiced.      Findings: No erythema or rash.   Neurological:      General: No focal deficit present.      Mental Status: He is alert and oriented to person, place, and time. Mental status is at baseline.      Motor: No weakness.   Psychiatric:         Mood and Affect: Mood normal.         Behavior: Behavior normal.         Thought Content: Thought content normal.         Judgment: Judgment normal.             Significant Labs: All pertinent labs within the past 24 hours have been reviewed.  CBC:   Recent Labs   Lab 07/23/24  1548 07/24/24  0418   WBC 8.44 6.80   HGB 13.6* 11.5*   HCT 44.6 38.4*    217     CMP:   Recent Labs   Lab 07/23/24  1548 07/24/24  0418   * 138   K 3.3* 3.3*    103   CO2 21* 28   GLU 86 80   BUN 5* 3*   CREATININE 0.8 0.7   CALCIUM 9.2 8.6*   PROT 7.9  --    ALBUMIN 3.7  --    BILITOT 0.9  --    ALKPHOS 167*  --    AST 20  --    ALT 17  --    ANIONGAP 12 7*       Significant Imaging: I have reviewed all pertinent imaging results/findings within the past 24 hours.

## 2024-07-24 NOTE — ANESTHESIA POSTPROCEDURE EVALUATION
Anesthesia Post Evaluation    Patient: Tasia Cardona    Procedure(s) Performed: Procedure(s) (LRB):  EGD (ESOPHAGOGASTRODUODENOSCOPY) (N/A)  ULTRASOUND, UPPER GI TRACT, ENDOSCOPIC (N/A)    Final Anesthesia Type: general      Patient location during evaluation: PACU  Patient participation: Yes- Able to Participate  Level of consciousness: awake and alert  Post-procedure vital signs: reviewed and stable  Pain management: adequate  Airway patency: patent  LORRAINE mitigation strategies: Multimodal analgesia, Extubation while patient is awake and Verification of full reversal of neuromuscular block  PONV status at discharge: No PONV  Anesthetic complications: no      Cardiovascular status: stable  Respiratory status: unassisted and spontaneous ventilation  Hydration status: euvolemic  Follow-up not needed.              Vitals Value Taken Time   /74 07/24/24 1501   Temp 36.6 °C (97.8 °F) 07/24/24 1445   Pulse 104 07/24/24 1504   Resp 26 07/24/24 1504   SpO2 93 % 07/24/24 1504   Vitals shown include unfiled device data.      No case tracking events are documented in the log.      Pain/Mayo Score: Pain Rating Prior to Med Admin: 8 (7/24/2024 11:26 AM)  Pain Rating Post Med Admin: 4 (7/24/2024 11:27 AM)

## 2024-07-24 NOTE — HOSPITAL COURSE
7/24 - Reported feer and night sweats at home PTA.   Not on antibiotics yet. Blood culture ordered.  No SIRS since admission.   Per IR, The collection is deep and surrounded by varices so there is not a good percutaneous window for drainage.  AES consulted to see if they can offer another approach or continue to follow it and monitor.  He is going to procedure today. I discussed what to expect.  Blood pressure 139/77, VSS. H/h stable,- 11.5; K 3.5  PER AES: The fluid collection still too complex to be drained, appears to have a lot of septations. there was small amount of bright fluid that could have been blood. He could have had a small component of hemorrhagic pancreatitis as cause of the pain. Recommend CTA to eval that area. CTA was repeated. Collection was not increasing. Evaluated by AES again. CTA reviewed, no signs of hemorrhagic pancreatitis. No indication for IR drainage at this time. Recommend continued supportive care.  Patient was treated with supportive care for acute on chronic pancreatitis. Patient able to tolerate oral diet and abdominal pain improved.  Patient is stable and now ready for discharge.   Referrals placed for gastroenterology, ID, and PCP.   Continued on home medications.

## 2024-07-24 NOTE — SUBJECTIVE & OBJECTIVE
Past Medical History:   Diagnosis Date    Acute necrotizing pancreatitis 09/20/2023    Alcohol use disorder 10/05/2023    Asthma     Hepatitis C antibody positive in blood 09/18/2023 9/2023 PCR negative    HIV infection 10/2021    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       Past Surgical History:   Procedure Laterality Date    ENDOSCOPIC ULTRASOUND OF UPPER GASTROINTESTINAL TRACT N/A 10/23/2023    Procedure: ULTRASOUND, UPPER GI TRACT, ENDOSCOPIC;  Surgeon: Emil Villatoro MD;  Location: [facility] ([address] [place] [address]);  Service: Endoscopy;  Laterality: N/A;    ERCP N/A 10/23/2023    Procedure: ERCP (ENDOSCOPIC RETROGRADE CHOLANGIOPANCREATOGRAPHY);  Surgeon: Emil Villatoro MD;  Location: [facility] ([facility]);  Service: Endoscopy;  Laterality: N/A;    ESOPHAGOGASTRODUODENOSCOPY N/A 9/18/2023    Procedure: EGD (ESOPHAGOGASTRODUODENOSCOPY);  Surgeon: Kg Cleaning MD;  Location: [facility] ([facility]  Service: Endoscopy;  Laterality: N/A;    ESOPHAGOGASTRODUODENOSCOPY N/A 3/8/2024    Procedure: EGD (ESOPHAGOGASTRODUODENOSCOPY);  Surgeon: Greg Love MD;  Location: [facility] ([facility]);  Service: Endoscopy;  Laterality: N/A;       Review of patient's allergies indicates:   Allergen Reactions    [place] Swelling    Pecan nut Swelling    Penicillins Hives     Tolerates cephalosporins    Tolerated piperacillin/tazobactam 11/2023    Soy Other (See Comments)    Sulfa (sulfonamide antibiotics) Hives       No current facility-administered medications on file prior to encounter.     Current Outpatient Medications on File Prior to Encounter   Medication Sig    uipfocbne-hxsuxfnn-ewzvmyn ala (BIKTARVY) -25 mg (25 kg or greater) Take 1 tablet by mouth once daily.    pantoprazole (PROTONIX) 40 MG tablet Take 1 tablet (40  mg total) by mouth once daily.    promethazine (PHENERGAN) 25 MG tablet Take 1 tablet (25 mg total) by mouth every 4 (four) hours.     Family History       Problem Relation (Age of Onset)    Breast cancer Maternal Grandmother    Cancer Mother, Brother    No Known Problems Father    Ovarian cancer Mother    Pancreatitis Mother          Tobacco Use    Smoking status: Former     Types: Cigarettes    Smokeless tobacco: Never   Substance and Sexual Activity    Alcohol use: Not Currently    Drug use: Never    Sexual activity: Not on file     Review of Systems   Constitutional:  Positive for chills and diaphoresis. Negative for activity change, appetite change and fever (Tmax 101F).   Respiratory:  Negative for cough, shortness of breath and wheezing.    Cardiovascular:  Negative for chest pain and palpitations.   Gastrointestinal:  Positive for abdominal pain and nausea. Negative for constipation, diarrhea and vomiting.   Genitourinary:  Negative for decreased urine volume, difficulty urinating, dysuria, frequency, hematuria and urgency.   Musculoskeletal:  Negative for back pain, neck pain and neck stiffness.   Skin:  Negative for color change and pallor.   Neurological:  Negative for dizziness, syncope, weakness, light-headedness and headaches.   Hematological:  Does not bruise/bleed easily.   Psychiatric/Behavioral:  Negative for agitation and confusion.      Objective:     Vital Signs (Most Recent):  Temp: 98.3 °F (36.8 °C) (07/23/24 2347)  Pulse: 98 (07/23/24 2347)  Resp: 18 (07/23/24 2347)  BP: 127/82 (07/23/24 2347)  SpO2: 99 % (07/23/24 2347) Vital Signs (24h Range):  Temp:  [98 °F (36.7 °C)-99.3 °F (37.4 °C)] 98.3 °F (36.8 °C)  Pulse:  [84-98] 98  Resp:  [16-18] 18  SpO2:  [98 %-99 %] 99 %  BP: (124-132)/(78-83) 127/82     Weight: 65.8 kg (145 lb)  Body mass index is 20.81 kg/m².     Physical Exam  Vitals and nursing note reviewed.   Constitutional:       General: He is not in acute distress.     Appearance:  Normal appearance. He is well-developed and normal weight. He is not ill-appearing, toxic-appearing or diaphoretic.   HENT:      Head: Normocephalic and atraumatic.      Right Ear: External ear normal.      Left Ear: External ear normal.   Eyes:      General: No scleral icterus.        Right eye: No discharge.         Left eye: No discharge.      Conjunctiva/sclera: Conjunctivae normal.   Neck:      Vascular: No JVD.      Trachea: No tracheal deviation.   Cardiovascular:      Rate and Rhythm: Normal rate and regular rhythm.      Heart sounds: Normal heart sounds. No murmur heard.     No gallop.   Pulmonary:      Effort: Pulmonary effort is normal. No respiratory distress.      Breath sounds: Normal breath sounds. No stridor. No wheezing or rales.   Abdominal:      General: Bowel sounds are normal. There is no distension.      Palpations: Abdomen is soft. There is no mass.      Tenderness: There is no abdominal tenderness (upper abdomen). There is no guarding.   Musculoskeletal:         General: No deformity. Normal range of motion.      Cervical back: Normal range of motion and neck supple.   Skin:     General: Skin is warm and dry.   Neurological:      General: No focal deficit present.      Mental Status: He is alert and oriented to person, place, and time.      Cranial Nerves: No cranial nerve deficit.      Motor: No abnormal muscle tone.      Coordination: Coordination normal.   Psychiatric:         Mood and Affect: Mood normal.         Behavior: Behavior normal.         Thought Content: Thought content normal.         Judgment: Judgment normal.                Significant Labs: All pertinent labs within the past 24 hours have been reviewed.  BMP:   Recent Labs   Lab 07/23/24  1548   GLU 86   *   K 3.3*      CO2 21*   BUN 5*   CREATININE 0.8   CALCIUM 9.2     CBC:   Recent Labs   Lab 07/23/24  1548   WBC 8.44   HGB 13.6*   HCT 44.6        CMP:   Recent Labs   Lab 07/23/24  1548   *   K  "3.3*      CO2 21*   GLU 86   BUN 5*   CREATININE 0.8   CALCIUM 9.2   PROT 7.9   ALBUMIN 3.7   BILITOT 0.9   ALKPHOS 167*   AST 20   ALT 17   ANIONGAP 12     Urine Culture: No results for input(s): "LABURIN" in the last 48 hours.  Urine Studies: No results for input(s): "COLORU", "APPEARANCEUA", "PHUR", "SPECGRAV", "PROTEINUA", "GLUCUA", "KETONESU", "BILIRUBINUA", "OCCULTUA", "NITRITE", "UROBILINOGEN", "LEUKOCYTESUR", "RBCUA", "WBCUA", "BACTERIA", "SQUAMEPITHEL", "HYALINECASTS" in the last 48 hours.    Invalid input(s): "WRIGHTSUR"    Significant Imaging: I have reviewed all pertinent imaging results/findings within the past 24 hours.  Imaging Results              CT Abdomen Pelvis With IV Contrast NO Oral Contrast (Final result)  Result time 07/23/24 18:38:56      Final result by Dakotah Tejada MD (07/23/24 18:38:56)                   Impression:      1. Findings suggesting sequela of mild acute pancreatitis.  New versus interval enlargement/organization of previous peripancreatic collection along the anterior upper aspect of the pancreas.  Abscess considered less likely but not entirely excluded.  Previous additional peripancreatic collections have resolved.  2. Resolution of previous lower esophageal wall thickening.  3. Hepatosplenomegaly.  4. 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  5. Additional findings as detailed above.      Electronically signed by: Dakotah Tejada MD  Date:    07/23/2024  Time:    18:38               Narrative:    EXAMINATION:  CT ABDOMEN PELVIS WITH IV CONTRAST    CLINICAL HISTORY:  Epigastric pain;    TECHNIQUE:  Low dose axial images, sagittal and coronal reformations were obtained from the lung bases to the pubic symphysis following the IV administration of 75 mL of Omnipaque 350 .  Oral contrast was not given.    COMPARISON:  Right upper quadrant ultrasound 03/08/2024, CT abdomen and pelvis 03/04/2024    FINDINGS:  No consolidation or pleural " effusion at the imaged lung bases.  Base of the heart is within normal limits.    No significant biliary ductal dilatation.  Liver and spleen are both enlarged without focal process.  Portal veins are patent.  Chronic splenic vein thrombosis with left upper quadrant collateral vessels similar to prior.    Subtle fat stranding about the pancreatic head and neck, although to a lesser degree from 03/04/2024 study.  Previous pancreatic collection along the anterior and superior aspect of the pancreatic head, neck and proximal body appears larger and more organized now measuring approximately 3.8 x 6.8 cm.  No associated internal gas foci.  Previous additional several peripancreatic collections have resolved.  No enhancing pancreatic mass.  No pancreatic ductal dilatation or parenchymal calcifications.    Stomach, duodenum and bilateral adrenal glands are within normal limits.    Bilateral kidneys are normal in size, shape and location with symmetric normal enhancement.  No hydronephrosis or significant perinephric stranding.  Retroaortic left renal vein.  Ureters are normal in course and caliber.  Urinary bladder is well distended without wall thickening.  Prostate and seminal vesicles are within normal limits.  Few scattered small pelvic phleboliths noted.    Appendix and terminal ileum are within normal limits.  No evidence of bowel obstruction or acute bowel inflammation.  No pneumatosis or portal venous gas.    No ascites, free air or lymphadenopathy by CT criteria.    No significant atheromatous disease.  No aortic aneurysm or dissection.    Extraperitoneal soft tissues are within normal limits.  Osseous structures are within normal limits.

## 2024-07-24 NOTE — ASSESSMENT & PLAN NOTE
Patient has hypokalemia which is Acute and currently worsening. Most recent potassium levels reviewed-   Lab Results   Component Value Date    K 3.3 (L) 07/24/2024   . Will continue potassium replacement per protocol and recheck repeat levels after replacement completed.     Repleted 7/23, 7/24

## 2024-07-24 NOTE — ASSESSMENT & PLAN NOTE
- Mr. Tasia Cardona presents with acute worsening of chronic pancreatitis   - he has h/o alcohol use, denies use x 30 days   - ED labs without elevated lipase   - CT shows mild acute pancreatitis with new/interval enlargement of prior peripancreatic collection along anterior upper aspect of pancreas  - NPO at [facility]    - consulting IR for drainage of fluid collection   - IV fluids for hydration  - PRN meds for pain/nausea    7/24- IR is unable to approach fluid collection. There are surrounding varicies. Consider AES or surgery consult if needed.

## 2024-07-24 NOTE — TRANSFER OF CARE
"Anesthesia Transfer of Care Note    Patient: Tasia Cardona    Procedure(s) Performed: Procedure(s) (LRB):  EGD (ESOPHAGOGASTRODUODENOSCOPY) (N/A)  ULTRASOUND, UPPER GI TRACT, ENDOSCOPIC (N/A)    Patient location: PACU    Anesthesia Type: general    Transport from OR: Transported from OR on 6-10 L/min O2 by face mask with adequate spontaneous ventilation    Post pain: adequate analgesia    Post assessment: tolerated procedure well and no apparent anesthetic complications    Post vital signs: stable    Level of consciousness: awake and alert    Nausea/Vomiting: no nausea/vomiting    Complications: none    Transfer of care protocol was followed    Last vitals: Visit Vitals  /87 (BP Location: Left arm, Patient Position: Lying)   Pulse 102   Temp 37.1 °C (98.8 °F)   Resp 16   Ht 5' 10" (1.778 m)   Wt 65.8 kg (145 lb)   SpO2 96%   BMI 20.81 kg/m²     "

## 2024-07-24 NOTE — NURSING
Nurses Note -- 4 Eyes      7/24/2024   6:21 PM      Skin assessed during: Admit      [x] No Altered Skin Integrity Present    []Prevention Measures Documented      [] Yes- Altered Skin Integrity Present or Discovered   [] LDA Added if Not in Epic (Describe Wound)   [] New Altered Skin Integrity was Present on Admit and Documented in LDA   [] Wound Image Taken    Wound Care Consulted? No    Attending Nurse:  ROLANDO Workman      Second RN/Staff Member:  Wei Washington RN

## 2024-07-24 NOTE — ED NOTES
Assumed pt care, he is lying in the bed resting NAD noted. Pt does c/o abd pain, he denies N/V. Pt informed of the POC, he verbalizes understanding. Pt aware of his NPO status.

## 2024-07-24 NOTE — CONSULTS
In brief, 24M with acute on chronic pancreatitis who presented to [facility] for abdominal pain, found to have enlarging fluid collection near the pancreatic head. IR consulted for drainage of fluid collection. Imaging reviewed. Collection has several surrounding collateral vessels, limiting available window for IR drainage. This may be amenable to AES intervention, recommend obtaining their opinion on possible drainage.    Rafita Irby MD  Department of Radiology, PGY-3

## 2024-07-24 NOTE — NURSING TRANSFER
Nursing Transfer Note      7/24/2024   3:32 PM    Nurse giving handoff: ROLANDO Morales  Nurse receiving handoff: ROLANDO Toro    Reason patient is being transferred: post anesthesia    Transfer From: PACU to EDOU 2    Transfer via stretcher    Transported by PCT x 2    4eyes on Skin: yes    Medicines sent: none    Any special needs or follow-up needed: none    Patient belongings transferred with patient: Yes, patient's cell phone with him    Chart send with patient: Yes    Patient reassessed at: today @ 0059

## 2024-07-24 NOTE — PROVATION PATIENT INSTRUCTIONS
Discharge Summary/Instructions after an Endoscopic Procedure  Patient Name: Tasia Cardona  Patient MRN: [id number]  Patient YOB: 2000 Wednesday, July 24, 2024  Faustino Trujillo MD  Dear patient,  As a result of recent federal legislation (The Federal Cures Act), you may   receive lab or pathology results from your procedure in your [facility]   account before your physician is able to contact you. Your physician or   their representative will relay the results to you with their   recommendations at their soonest availability.  Thank you,  RESTRICTIONS:  During your procedure today, you received medications for sedation.  These   medications may affect your judgment, balance and coordination.  Therefore,   for 24 hours, you have the following restrictions:   - DO NOT drive a car, operate machinery, make legal/financial decisions,   sign important papers or drink alcohol.    ACTIVITY:  Today: no heavy lifting, straining or running due to procedural   sedation/anesthesia.  The following day: return to full activity including work.  DIET:  Eat and drink normally unless instructed otherwise.     TREATMENT FOR COMMON SIDE EFFECTS:  - Mild abdominal pain, nausea, belching, bloating or excessive gas:  rest,   eat lightly and use a heating pad.  - Sore Throat: treat with throat lozenges and/or gargle with warm salt   water.  - Because air was used during the procedure, expelling large amounts of air   from your rectum or belching is normal.  - If a bowel prep was taken, you may not have a bowel movement for 1-3 days.    This is normal.  SYMPTOMS TO WATCH FOR AND REPORT TO YOUR PHYSICIAN:  1. Abdominal pain or bloating, other than gas cramps.  2. Chest pain.  3. Back pain.  4. Signs of infection such as: chills or fever occurring within 24 hours   after the procedure.  5. Rectal bleeding, which would show as bright red, maroon, or black stools.   (A tablespoon of blood from the rectum is not serious, especially if    hemorrhoids are present.)  6. Vomiting.  7. Weakness or dizziness.  GO DIRECTLY TO THE NEAREST EMERGENCY ROOM IF YOU HAVE ANY OF THE FOLLOWING:      Difficulty breathing              Chills and/or fever over 101 F   Persistent vomiting and/or vomiting blood   Severe abdominal pain   Severe chest pain   Black, tarry stools   Bleeding- more than one tablespoon   Any other symptom or condition that you feel may need urgent attention  Your doctor recommends these additional instructions:  If any biopsies were taken, your doctors clinic will contact you in 1 to 2   weeks with any results.  - Return patient to hospital [facility] for ongoing care.   - Recommend a CTA given the high suspicious of intracystic bleed.  For questions, problems or results please call your physician - Faustino Trujillo MD at Work:  [phone].  [facility], EMERGENCY ROOM PHONE NUMBER: [phone]  IF A COMPLICATION OR EMERGENCY SITUATION ARISES AND YOU ARE UNABLE TO REACH   YOUR PHYSICIAN - GO DIRECTLY TO THE EMERGENCY ROOM.  Faustino Trujillo MD  7/24/2024 2:57:57 PM  This report has been verified and signed electronically.  Dear patient,  As a result of recent federal legislation (The Federal Cures Act), you may   receive lab or pathology results from your procedure in your [facility]   account before your physician is able to contact you. Your physician or   their representative will relay the results to you with their   recommendations at their soonest availability.  Thank you,  PROVATION

## 2024-07-25 LAB
ANION GAP SERPL CALC-SCNC: 8 MMOL/L (ref 8–16)
BASOPHILS # BLD AUTO: 0.01 K/UL (ref 0–0.2)
BASOPHILS NFR BLD: 0.1 % (ref 0–1.9)
BUN SERPL-MCNC: 6 MG/DL (ref 6–20)
CALCIUM SERPL-MCNC: 8.8 MG/DL (ref 8.7–10.5)
CHLORIDE SERPL-SCNC: 102 MMOL/L (ref 95–110)
CO2 SERPL-SCNC: 24 MMOL/L (ref 23–29)
CREAT SERPL-MCNC: 0.8 MG/DL (ref 0.5–1.4)
DIFFERENTIAL METHOD BLD: ABNORMAL
EOSINOPHIL # BLD AUTO: 0 K/UL (ref 0–0.5)
EOSINOPHIL NFR BLD: 0 % (ref 0–8)
ERYTHROCYTE [DISTWIDTH] IN BLOOD BY AUTOMATED COUNT: 17.9 % (ref 11.5–14.5)
EST. GFR  (NO RACE VARIABLE): >60 ML/MIN/1.73 M^2
GLUCOSE SERPL-MCNC: 208 MG/DL (ref 70–110)
HCT VFR BLD AUTO: 38.2 % (ref 40–54)
HGB BLD-MCNC: 11.6 G/DL (ref 14–18)
IMM GRANULOCYTES # BLD AUTO: 0.01 K/UL (ref 0–0.04)
IMM GRANULOCYTES NFR BLD AUTO: 0.1 % (ref 0–0.5)
LYMPHOCYTES # BLD AUTO: 0.9 K/UL (ref 1–4.8)
LYMPHOCYTES NFR BLD: 13.7 % (ref 18–48)
MAGNESIUM SERPL-MCNC: 2 MG/DL (ref 1.6–2.6)
MCH RBC QN AUTO: 26.4 PG (ref 27–31)
MCHC RBC AUTO-ENTMCNC: 30.4 G/DL (ref 32–36)
MCV RBC AUTO: 87 FL (ref 82–98)
MONOCYTES # BLD AUTO: 0.6 K/UL (ref 0.3–1)
MONOCYTES NFR BLD: 8.3 % (ref 4–15)
NEUTROPHILS # BLD AUTO: 5.3 K/UL (ref 1.8–7.7)
NEUTROPHILS NFR BLD: 77.8 % (ref 38–73)
NRBC BLD-RTO: 0 /100 WBC
PLATELET # BLD AUTO: 254 K/UL (ref 150–450)
PMV BLD AUTO: 10.8 FL (ref 9.2–12.9)
POTASSIUM SERPL-SCNC: 4.3 MMOL/L (ref 3.5–5.1)
RBC # BLD AUTO: 4.39 M/UL (ref 4.6–6.2)
SODIUM SERPL-SCNC: 134 MMOL/L (ref 136–145)
WBC # BLD AUTO: 6.78 K/UL (ref 3.9–12.7)

## 2024-07-25 PROCEDURE — 25000003 PHARM REV CODE 250: Performed by: HOSPITALIST

## 2024-07-25 PROCEDURE — 63600175 PHARM REV CODE 636 W HCPCS: Performed by: STUDENT IN AN ORGANIZED HEALTH CARE EDUCATION/TRAINING PROGRAM

## 2024-07-25 PROCEDURE — 83735 ASSAY OF MAGNESIUM: CPT | Performed by: PHYSICIAN ASSISTANT

## 2024-07-25 PROCEDURE — 21400001 HC TELEMETRY ROOM

## 2024-07-25 PROCEDURE — 36415 COLL VENOUS BLD VENIPUNCTURE: CPT | Performed by: PHYSICIAN ASSISTANT

## 2024-07-25 PROCEDURE — 85025 COMPLETE CBC W/AUTO DIFF WBC: CPT | Performed by: PHYSICIAN ASSISTANT

## 2024-07-25 PROCEDURE — 63600175 PHARM REV CODE 636 W HCPCS: Performed by: HOSPITALIST

## 2024-07-25 PROCEDURE — 25000003 PHARM REV CODE 250: Performed by: PHYSICIAN ASSISTANT

## 2024-07-25 PROCEDURE — 63600175 PHARM REV CODE 636 W HCPCS: Performed by: PHYSICIAN ASSISTANT

## 2024-07-25 PROCEDURE — 80048 BASIC METABOLIC PNL TOTAL CA: CPT | Performed by: PHYSICIAN ASSISTANT

## 2024-07-25 RX ORDER — HYDROMORPHONE HYDROCHLORIDE 1 MG/ML
2 INJECTION, SOLUTION INTRAMUSCULAR; INTRAVENOUS; SUBCUTANEOUS EVERY 4 HOURS PRN
Status: DISCONTINUED | OUTPATIENT
Start: 2024-07-25 | End: 2024-07-27

## 2024-07-25 RX ADMIN — SODIUM CHLORIDE: 9 INJECTION, SOLUTION INTRAVENOUS at 12:07

## 2024-07-25 RX ADMIN — HYDROMORPHONE HYDROCHLORIDE 2 MG: 1 INJECTION, SOLUTION INTRAMUSCULAR; INTRAVENOUS; SUBCUTANEOUS at 05:07

## 2024-07-25 RX ADMIN — HYDROMORPHONE HYDROCHLORIDE 1.5 MG: 1 INJECTION, SOLUTION INTRAMUSCULAR; INTRAVENOUS; SUBCUTANEOUS at 05:07

## 2024-07-25 RX ADMIN — PROMETHAZINE HYDROCHLORIDE 12.5 MG: 25 INJECTION INTRAMUSCULAR; INTRAVENOUS at 09:07

## 2024-07-25 RX ADMIN — BICTEGRAVIR SODIUM, EMTRICITABINE, AND TENOFOVIR ALAFENAMIDE FUMARATE 1 TABLET: 50; 200; 25 TABLET ORAL at 09:07

## 2024-07-25 RX ADMIN — HYDROMORPHONE HYDROCHLORIDE 1.5 MG: 1 INJECTION, SOLUTION INTRAMUSCULAR; INTRAVENOUS; SUBCUTANEOUS at 09:07

## 2024-07-25 RX ADMIN — OXYCODONE HYDROCHLORIDE 10 MG: 10 TABLET ORAL at 03:07

## 2024-07-25 RX ADMIN — SODIUM CHLORIDE: 9 INJECTION, SOLUTION INTRAVENOUS at 09:07

## 2024-07-25 RX ADMIN — HYDROMORPHONE HYDROCHLORIDE 2 MG: 1 INJECTION, SOLUTION INTRAMUSCULAR; INTRAVENOUS; SUBCUTANEOUS at 01:07

## 2024-07-25 RX ADMIN — PROMETHAZINE HYDROCHLORIDE 12.5 MG: 25 INJECTION INTRAMUSCULAR; INTRAVENOUS at 04:07

## 2024-07-25 RX ADMIN — HYDROMORPHONE HYDROCHLORIDE 2 MG: 1 INJECTION, SOLUTION INTRAMUSCULAR; INTRAVENOUS; SUBCUTANEOUS at 09:07

## 2024-07-25 RX ADMIN — PANTOPRAZOLE SODIUM 40 MG: 40 TABLET, DELAYED RELEASE ORAL at 09:07

## 2024-07-25 RX ADMIN — SODIUM CHLORIDE: 9 INJECTION, SOLUTION INTRAVENOUS at 03:07

## 2024-07-25 NOTE — PLAN OF CARE
Problem: Fall Injury Risk  Goal: Absence of Fall and Fall-Related Injury  Outcome: Progressing     Problem: Pain Acute  Goal: Optimal Pain Control and Function  Outcome: Progressing     Problem: Pancreatitis  Goal: Fluid and Electrolyte Balance  Outcome: Progressing  Goal: Absence of Infection Signs and Symptoms  Outcome: Progressing  Goal: Optimal Nutrition Delivery  Outcome: Progressing  Goal: Optimal Pain Control and Function  Outcome: Progressing

## 2024-07-25 NOTE — TREATMENT PLAN
AES Treatment Plan     Seen and examined this morning. S/p EUS yesterday. CTA reviewed, no signs of hemorrhagic pancreatitis. No indication for IR drainage at this time. Recommend continued supportive care.    AES will sign off.     Thank you for involving us in the care of Tasia Cardona. Please call with any additional questions, concerns or changes in the patient's clinical status.       Roxy Moon MD  Gastroenterology Fellow, PGY IV

## 2024-07-25 NOTE — PLAN OF CARE
[facility]  Initial Discharge Assessment       Primary Care Provider: Pina Jones NP    Admission Diagnosis: Peripancreatic fluid collection [K86.89]  Acute on chronic pancreatitis [K85.90, K86.1]  Acute pancreatitis, unspecified complication status, unspecified pancreatitis type [K85.90]  HIV infection, unspecified symptom status [B20]    Admission Date: 7/23/2024  Expected Discharge Date: 7/25/2024    Transition of Care Barriers: (P) None    Payor: MEDICAID / Plan: [email or web address] [facility] / Product Type: Managed Medicaid /     Extended Emergency Contact Information  Primary Emergency Contact: Denice Cardona  Mobile Phone: [phone]  Relation: Mother  Preferred language: English   needed? No    Discharge Plan A: (P) Home  Discharge Plan B: (P) Home      [id number] - [place], [place] - [address] AT [facility] [address]  [address]  [facility] [address]  Phone: [phone] Fax: [phone]    [facility] [place] [facility] [place] - [address]  [address]  [place] [address]  Phone: [phone] Fax: [phone]    [facility]  [address] [facility] [address]  Phone: [phone] Fax: [phone]      CM met with patient to discuss discharge planning. Patient lives home alone in a single story home with no steps to enter. Prior to hospital admission, patient was independent with ADLs with no steps to enter. Discharge plan is home with no needs. Discharge Plan A and Plan B have been determined by review of patient's clinical status, future medical and therapeutic needs, and coverage/benefits for post-acute care in coordination with multidisciplinary team members.  Initial Assessment (most recent)       Adult Discharge Assessment - 07/25/24 0664          Discharge Assessment    Assessment Type Discharge Planning Assessment (P)      Confirmed/corrected address, phone  number and insurance Yes (P)      Confirmed Demographics Correct on Facesheet (P)      Source of Information patient (P)      Communicated CASTILLO with patient/caregiver Yes (P)      Reason For Admission Acute on chronic pancreatitis (P)      People in Home alone (P)      Facility Arrived From: home (P)      Do you expect to return to your current living situation? Yes (P)      Do you have help at home or someone to help you manage your care at home? No (P)      Prior to hospitilization cognitive status: Alert/Oriented (P)      Current cognitive status: Alert/Oriented (P)      Walking or Climbing Stairs Difficulty no (P)      Dressing/Bathing Difficulty no (P)      Equipment Currently Used at Home none (P)      Readmission within 30 days? No (P)      Patient currently being followed by outpatient case management? No (P)      Do you currently have service(s) that help you manage your care at home? No (P)      Do you take prescription medications? Yes (P)      Do you have prescription coverage? Yes (P)      Coverage Medicaid- [facility] (P)      Do you have any problems affording any of your prescribed medications? No (P)      Is the patient taking medications as prescribed? yes (P)      Who is going to help you get home at discharge? Case Management (P)      How do you get to doctors appointments? public transportation (P)      Are you on dialysis? No (P)      Do you take coumadin? No (P)      Discharge Plan A Home (P)      Discharge Plan B Home (P)      DME Needed Upon Discharge  none (P)      Discharge Plan discussed with: Patient (P)      Transition of Care Barriers None (P)         Physical Activity    On average, how many days per week do you engage in moderate to strenuous exercise (like a brisk walk)? 7 days (P)      On average, how many minutes do you engage in exercise at this level? 90 min (P)         Financial Resource Strain    How hard is it for you to pay for the very basics like food, housing,  medical care, and heating? Not very hard (P)         Housing Stability    In the last 12 months, was there a time when you were not able to pay the mortgage or rent on time? No (P)      At any time in the past 12 months, were you homeless or living in a shelter (including now)? No (P)         Transportation Needs    Has the lack of transportation kept you from medical appointments, meetings, work or from getting things needed for daily living? No (P)         Food Insecurity    Within the past 12 months, you worried that your food would run out before you got the money to buy more. Never true (P)      Within the past 12 months, the food you bought just didn't last and you didn't have money to get more. Never true (P)         Stress    Do you feel stress - tense, restless, nervous, or anxious, or unable to sleep at night because your mind is troubled all the time - these days? Only a little (P)         Social Isolation    How often do you feel lonely or isolated from those around you?  Rarely (P)         Alcohol Use    Q1: How often do you have a drink containing alcohol? Never (P)      Q2: How many drinks containing alcohol do you have on a typical day when you are drinking? Patient does not drink (P)      Q3: How often do you have six or more drinks on one occasion? Never (P)         MongoDBities    In the past 12 months has the electric, gas, oil, or water company threatened to shut off services in your home? No (P)         Health Literacy    How often do you need to have someone help you when you read instructions, pamphlets, or other written material from your doctor or pharmacy? Rarely (P)                           NASIM Baird  Case Management  [phone]

## 2024-07-26 LAB
ANION GAP SERPL CALC-SCNC: 8 MMOL/L (ref 8–16)
BASOPHILS # BLD AUTO: 0.03 K/UL (ref 0–0.2)
BASOPHILS NFR BLD: 0.5 % (ref 0–1.9)
BUN SERPL-MCNC: 9 MG/DL (ref 6–20)
CALCIUM SERPL-MCNC: 8.4 MG/DL (ref 8.7–10.5)
CHLORIDE SERPL-SCNC: 106 MMOL/L (ref 95–110)
CO2 SERPL-SCNC: 26 MMOL/L (ref 23–29)
CREAT SERPL-MCNC: 0.8 MG/DL (ref 0.5–1.4)
DIFFERENTIAL METHOD BLD: ABNORMAL
EOSINOPHIL # BLD AUTO: 0.1 K/UL (ref 0–0.5)
EOSINOPHIL NFR BLD: 1.9 % (ref 0–8)
ERYTHROCYTE [DISTWIDTH] IN BLOOD BY AUTOMATED COUNT: 18.6 % (ref 11.5–14.5)
EST. GFR  (NO RACE VARIABLE): >60 ML/MIN/1.73 M^2
GLUCOSE SERPL-MCNC: 117 MG/DL (ref 70–110)
HCT VFR BLD AUTO: 35.7 % (ref 40–54)
HGB BLD-MCNC: 10.8 G/DL (ref 14–18)
IMM GRANULOCYTES # BLD AUTO: 0.02 K/UL (ref 0–0.04)
IMM GRANULOCYTES NFR BLD AUTO: 0.3 % (ref 0–0.5)
LYMPHOCYTES # BLD AUTO: 2.3 K/UL (ref 1–4.8)
LYMPHOCYTES NFR BLD: 35.2 % (ref 18–48)
MAGNESIUM SERPL-MCNC: 1.8 MG/DL (ref 1.6–2.6)
MCH RBC QN AUTO: 26.5 PG (ref 27–31)
MCHC RBC AUTO-ENTMCNC: 30.3 G/DL (ref 32–36)
MCV RBC AUTO: 88 FL (ref 82–98)
MONOCYTES # BLD AUTO: 0.6 K/UL (ref 0.3–1)
MONOCYTES NFR BLD: 9 % (ref 4–15)
NEUTROPHILS # BLD AUTO: 3.4 K/UL (ref 1.8–7.7)
NEUTROPHILS NFR BLD: 53.1 % (ref 38–73)
NRBC BLD-RTO: 0 /100 WBC
PLATELET # BLD AUTO: 213 K/UL (ref 150–450)
PMV BLD AUTO: 10.2 FL (ref 9.2–12.9)
POTASSIUM SERPL-SCNC: 3.8 MMOL/L (ref 3.5–5.1)
RBC # BLD AUTO: 4.08 M/UL (ref 4.6–6.2)
SODIUM SERPL-SCNC: 140 MMOL/L (ref 136–145)
WBC # BLD AUTO: 6.44 K/UL (ref 3.9–12.7)

## 2024-07-26 PROCEDURE — 63600175 PHARM REV CODE 636 W HCPCS: Performed by: STUDENT IN AN ORGANIZED HEALTH CARE EDUCATION/TRAINING PROGRAM

## 2024-07-26 PROCEDURE — 21400001 HC TELEMETRY ROOM

## 2024-07-26 PROCEDURE — 80048 BASIC METABOLIC PNL TOTAL CA: CPT | Performed by: PHYSICIAN ASSISTANT

## 2024-07-26 PROCEDURE — 25000003 PHARM REV CODE 250: Performed by: STUDENT IN AN ORGANIZED HEALTH CARE EDUCATION/TRAINING PROGRAM

## 2024-07-26 PROCEDURE — 25000003 PHARM REV CODE 250: Performed by: PHYSICIAN ASSISTANT

## 2024-07-26 PROCEDURE — 85025 COMPLETE CBC W/AUTO DIFF WBC: CPT | Performed by: PHYSICIAN ASSISTANT

## 2024-07-26 PROCEDURE — 25000003 PHARM REV CODE 250: Performed by: HOSPITALIST

## 2024-07-26 PROCEDURE — 83735 ASSAY OF MAGNESIUM: CPT | Performed by: PHYSICIAN ASSISTANT

## 2024-07-26 PROCEDURE — 63600175 PHARM REV CODE 636 W HCPCS: Performed by: HOSPITALIST

## 2024-07-26 PROCEDURE — 36415 COLL VENOUS BLD VENIPUNCTURE: CPT | Performed by: PHYSICIAN ASSISTANT

## 2024-07-26 RX ORDER — POLYETHYLENE GLYCOL 3350 17 G/17G
17 POWDER, FOR SOLUTION ORAL DAILY PRN
Status: DISCONTINUED | OUTPATIENT
Start: 2024-07-26 | End: 2024-07-29 | Stop reason: HOSPADM

## 2024-07-26 RX ADMIN — PROMETHAZINE HYDROCHLORIDE 12.5 MG: 25 INJECTION INTRAMUSCULAR; INTRAVENOUS at 09:07

## 2024-07-26 RX ADMIN — SODIUM CHLORIDE: 9 INJECTION, SOLUTION INTRAVENOUS at 06:07

## 2024-07-26 RX ADMIN — HYDROMORPHONE HYDROCHLORIDE 2 MG: 1 INJECTION, SOLUTION INTRAMUSCULAR; INTRAVENOUS; SUBCUTANEOUS at 06:07

## 2024-07-26 RX ADMIN — PANTOPRAZOLE SODIUM 40 MG: 40 TABLET, DELAYED RELEASE ORAL at 08:07

## 2024-07-26 RX ADMIN — HYDROMORPHONE HYDROCHLORIDE 2 MG: 1 INJECTION, SOLUTION INTRAMUSCULAR; INTRAVENOUS; SUBCUTANEOUS at 02:07

## 2024-07-26 RX ADMIN — PROMETHAZINE HYDROCHLORIDE 12.5 MG: 25 INJECTION INTRAMUSCULAR; INTRAVENOUS at 12:07

## 2024-07-26 RX ADMIN — BICTEGRAVIR SODIUM, EMTRICITABINE, AND TENOFOVIR ALAFENAMIDE FUMARATE 1 TABLET: 50; 200; 25 TABLET ORAL at 08:07

## 2024-07-26 RX ADMIN — HYDROMORPHONE HYDROCHLORIDE 2 MG: 1 INJECTION, SOLUTION INTRAMUSCULAR; INTRAVENOUS; SUBCUTANEOUS at 11:07

## 2024-07-26 RX ADMIN — POLYETHYLENE GLYCOL 3350 17 G: 17 POWDER, FOR SOLUTION ORAL at 02:07

## 2024-07-26 RX ADMIN — SODIUM CHLORIDE: 9 INJECTION, SOLUTION INTRAVENOUS at 04:07

## 2024-07-26 RX ADMIN — SENNOSIDES AND DOCUSATE SODIUM 1 TABLET: 50; 8.6 TABLET ORAL at 09:07

## 2024-07-26 RX ADMIN — HYDROMORPHONE HYDROCHLORIDE 2 MG: 1 INJECTION, SOLUTION INTRAMUSCULAR; INTRAVENOUS; SUBCUTANEOUS at 10:07

## 2024-07-26 RX ADMIN — PROMETHAZINE HYDROCHLORIDE 12.5 MG: 25 INJECTION INTRAMUSCULAR; INTRAVENOUS at 04:07

## 2024-07-26 NOTE — PLAN OF CARE
[facility] S[facility]  Discharge Reassessment    Primary Care Provider: Pina Jones NP    Expected Discharge Date: 7/25/2024      Patient remains inpatient due to continued need for medical management. Patient continues to receive IV fluids and pain medication. Discharge plan is home with no needs. Discharge Plan A and Plan B have been determined by review of patient's clinical status, future medical and therapeutic needs, and coverage/benefits for post-acute care in coordination with multidisciplinary team members.  Reassessment (most recent)       Discharge Reassessment - 07/26/24 1641          Discharge Reassessment    Assessment Type Discharge Planning Reassessment (P)      Did the patient's condition or plan change since previous assessment? No (P)      Discharge Plan discussed with: Patient (P)      Communicated CASTILLO with patient/caregiver Yes (P)      Discharge Plan A Home (P)      Discharge Plan B Home (P)      DME Needed Upon Discharge  none (P)      Transition of Care Barriers None (P)      Why the patient remains in the hospital Requires continued medical care (P)         Post-Acute Status    Coverage Medicaid- [employer] (P)      Discharge Delays None known at this time (P)                        NASIM Baird  Case Management  [phone]

## 2024-07-26 NOTE — ASSESSMENT & PLAN NOTE
- Mr. Tasia Cardona presents with acute worsening of chronic pancreatitis   - he has h/o alcohol use, denies use x 30 days   - ED labs without elevated lipase   - CT shows mild acute pancreatitis with new/interval enlargement of prior peripancreatic collection along anterior upper aspect of pancreas  - s/p EGD   - no indication for IR drainage of fluid collection  - IV fluids for hydration  - PRN meds for pain/nausea    7/24- IR is unable to approach fluid collection. There are surrounding varicies. Consider AES or surgery consult if needed.

## 2024-07-26 NOTE — PLAN OF CARE
Problem: Fall Injury Risk  Goal: Absence of Fall and Fall-Related Injury  Outcome: Progressing     Problem: Pain Acute  Goal: Optimal Pain Control and Function  Outcome: Not Progressing     Problem: Pancreatitis  Goal: Fluid and Electrolyte Balance  Outcome: Not Progressing  Goal: Absence of Infection Signs and Symptoms  Outcome: Not Progressing  Goal: Optimal Nutrition Delivery  Outcome: Progressing  Goal: Optimal Pain Control and Function  Outcome: Not Progressing   Pt is A,A,O x 4 . Stable V/S. Pt remained to C/O nausea and sever pain in his abd and he received PRN pain and nausea medications as ordered. Pt ambulated in there room independently. No falls or injuries per day. Pt continued to received IVF. CIWA score 1-3 through the day. No significant changes during the shift.

## 2024-07-26 NOTE — SUBJECTIVE & OBJECTIVE
Interval History:     Patient complains of abdominal pain. GI recommend supportive care for management. CTA reviewed by them.    Review of Systems   Constitutional:  Positive for appetite change. Negative for fever.   Eyes: Negative.    Respiratory: Negative.     Cardiovascular: Negative.    Gastrointestinal:  Positive for abdominal pain. Negative for diarrhea and nausea.   Genitourinary: Negative.      Objective:     Vital Signs (Most Recent):  Temp: 97.9 °F (36.6 °C) (07/25/24 2109)  Pulse: 86 (07/25/24 2109)  Resp: 20 (07/25/24 2135)  BP: 123/74 (07/25/24 2109)  SpO2: 98 % (07/25/24 2109) Vital Signs (24h Range):  Temp:  [97.5 °F (36.4 °C)-98.7 °F (37.1 °C)] 97.9 °F (36.6 °C)  Pulse:  [86-99] 86  Resp:  [16-20] 20  SpO2:  [95 %-98 %] 98 %  BP: (119-135)/(72-90) 123/74     Weight: 65.8 kg (145 lb 1 oz)  Body mass index is 20.81 kg/m².    Intake/Output Summary (Last 24 hours) at 7/25/2024 2152  Last data filed at 7/25/2024 1620  Gross per 24 hour   Intake 1722 ml   Output --   Net 1722 ml         Physical Exam  Constitutional:       Appearance: He is not ill-appearing.   Cardiovascular:      Pulses: Normal pulses.      Heart sounds: Normal heart sounds.   Pulmonary:      Effort: Pulmonary effort is normal.      Breath sounds: Normal breath sounds.   Abdominal:      General: Abdomen is flat.      Palpations: Abdomen is soft.      Tenderness: There is abdominal tenderness.   Musculoskeletal:      Right lower leg: No edema.      Left lower leg: No edema.   Neurological:      Mental Status: He is alert and oriented to person, place, and time. Mental status is at baseline.             Significant Labs: All pertinent labs within the past 24 hours have been reviewed.  BMP:   Recent Labs   Lab 07/25/24  0358   *   *   K 4.3      CO2 24   BUN 6   CREATININE 0.8   CALCIUM 8.8   MG 2.0     CBC:   Recent Labs   Lab 07/24/24  0418 07/25/24  0358   WBC 6.80 6.78   HGB 11.5* 11.6*   HCT 38.4* 38.2*     254     CMP:   Recent Labs   Lab 07/24/24  0418 07/25/24  0358    134*   K 3.3* 4.3    102   CO2 28 24   GLU 80 208*   BUN 3* 6   CREATININE 0.7 0.8   CALCIUM 8.6* 8.8   ANIONGAP 7* 8       Significant Imaging: I have reviewed all pertinent imaging results/findings within the past 24 hours.

## 2024-07-26 NOTE — PROGRESS NOTES
"[facility]  Progress Note    Patient Name: Tasia Cardona  MRN: [id number]  Patient Class: IP- Inpatient   Admission Date: 7/23/2024  Length of Stay: 2 days  Attending Physician: Oriana Dozier MD  Primary Care Provider: Pina Jones NP        Subjective:     Principal Problem:Acute on chronic pancreatitis        HPI:  Mr. Tasia Cardona is a 24 y.o. male, with PMH of chronic pancreatitis, necrotizing pancreatitis, pancreatic pseudocyst, alcohol use disorder, opioid dependence with opoid induced constipation, Corinne-Chauhan tear, HIV, asthma, who presented to [facility] ED on 7/23/24 due to epigastric abdominal pain beginning the morning of presentation. He notes the pain is occasionally "sharp and squeezing" in nature, and radiates to the LLQ. Associated symptoms include nausea, vomiting, chills, He denied UTI symptoms, constipation ,diarrhea. He states he was scheduled for EGD this Friday. Reports he has not drank alcohol x 30 days. He was evaluated in the ED with labs showing no leukocytosis or left shift on CBC.  Metabolic panel showed potassium 3.3, with CO2 21, anion gap 12 0, and ALP of 167 on metabolic panel.  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 He was treated in the ED with 1L LR, potassium, zofran, bentyl, morphine, and dilaudid. He was admitted to inpatient status.     Overview/Hospital Course:  7/24 - Reported feer and night sweats at home PTA.   Not on antibiotics yet. Blood culture ordered.  No SIRS since admission.   Per IR, The collection is deep and surrounded by varices so there is not a good percutaneous window for drainage.  AES consulted to see if they can offer another approach or continue to follow it and " monitor.  He is going to procedure today. I discussed what to expect.  Blood pressure 139/77, VSS. H/h stable,- 11.5; K 3.5  PER AES: The fluid collection still too complex to be drained, appears to have a lot of septations. there was small amount of bright fluid that could have been blood. He could have had a small component of hemorrhagic pancreatitis as cause of the pain. Recommend CTA to eval that area.    Interval History:     Patient complains of abdominal pain. GI recommend supportive care for management. CTA reviewed by them.    Review of Systems   Constitutional:  Positive for appetite change. Negative for fever.   Eyes: Negative.    Respiratory: Negative.     Cardiovascular: Negative.    Gastrointestinal:  Positive for abdominal pain. Negative for diarrhea and nausea.   Genitourinary: Negative.      Objective:     Vital Signs (Most Recent):  Temp: 97.9 °F (36.6 °C) (07/25/24 2109)  Pulse: 86 (07/25/24 2109)  Resp: 20 (07/25/24 2135)  BP: 123/74 (07/25/24 2109)  SpO2: 98 % (07/25/24 2109) Vital Signs (24h Range):  Temp:  [97.5 °F (36.4 °C)-98.7 °F (37.1 °C)] 97.9 °F (36.6 °C)  Pulse:  [86-99] 86  Resp:  [16-20] 20  SpO2:  [95 %-98 %] 98 %  BP: (119-135)/(72-90) 123/74     Weight: 65.8 kg (145 lb 1 oz)  Body mass index is 20.81 kg/m².    Intake/Output Summary (Last 24 hours) at 7/25/2024 2152  Last data filed at 7/25/2024 1620  Gross per 24 hour   Intake 1722 ml   Output --   Net 1722 ml         Physical Exam  Constitutional:       Appearance: He is not ill-appearing.   Cardiovascular:      Pulses: Normal pulses.      Heart sounds: Normal heart sounds.   Pulmonary:      Effort: Pulmonary effort is normal.      Breath sounds: Normal breath sounds.   Abdominal:      General: Abdomen is flat.      Palpations: Abdomen is soft.      Tenderness: There is abdominal tenderness.   Musculoskeletal:      Right lower leg: No edema.      Left lower leg: No edema.   Neurological:      Mental Status: He is alert and  "oriented to person, place, and time. Mental status is at baseline.             Significant Labs: All pertinent labs within the past 24 hours have been reviewed.  BMP:   Recent Labs   Lab 07/25/24  0358   *   *   K 4.3      CO2 24   BUN 6   CREATININE 0.8   CALCIUM 8.8   MG 2.0     CBC:   Recent Labs   Lab 07/24/24  0418 07/25/24  0358   WBC 6.80 6.78   HGB 11.5* 11.6*   HCT 38.4* 38.2*    254     CMP:   Recent Labs   Lab 07/24/24  0418 07/25/24  0358    134*   K 3.3* 4.3    102   CO2 28 24   GLU 80 208*   BUN 3* 6   CREATININE 0.7 0.8   CALCIUM 8.6* 8.8   ANIONGAP 7* 8       Significant Imaging: I have reviewed all pertinent imaging results/findings within the past 24 hours.    Assessment/Plan:      * Acute on chronic pancreatitis  - Mr. Tasia Cardona presents with acute worsening of chronic pancreatitis   - he has h/o alcohol use, denies use x 30 days   - ED labs without elevated lipase   - CT shows mild acute pancreatitis with new/interval enlargement of prior peripancreatic collection along anterior upper aspect of pancreas  - s/p EGD   - no indication for IR drainage of fluid collection  - IV fluids for hydration  - PRN meds for pain/nausea    7/24- IR is unable to approach fluid collection. There are surrounding varicies. Consider AES or surgery consult if needed.       Peripancreatic fluid collection  - as above in "Acute on Chronic pancreatitis"       Pain management  Hx of opioid dependence with complicates pain management  On tylenol, oxycodone, dilaudid iv      Hypokalemia  Patient has hypokalemia which is Acute and currently worsening. Most recent potassium levels reviewed-   Lab Results   Component Value Date    K 3.3 (L) 07/24/2024   . Will continue potassium replacement per protocol and recheck repeat levels after replacement completed.     Repleted 7/23, 7/24    Alcohol use disorder  - states no use x 30 days   - monitor, will order CIWA scales       Splenic vein " thrombosis  - history noted   - CT  re demonstrates this finding      Mild intermittent asthma without complication  - chronic, stable   - monitor   - PRN DuoNebs if needed       HIV infection  - continue home meds   - CD4 1080 10/2023  - follows w/ Infectious Disease (Dr. Velasco)         VTE Risk Mitigation (From admission, onward)           Ordered     heparin (porcine) injection 5,000 Units  Every 8 hours         07/23/24 2058     IP VTE HIGH RISK PATIENT  Once         07/23/24 2058     Place sequential compression device  Until discontinued         07/23/24 2058                    Discharge Planning   CASTILLO: 7/28/2024     Code Status: Full Code   Is the patient medically ready for discharge?:     Reason for patient still in hospital (select all that apply): Patient trending condition  Discharge Plan A: Home                  Oriana Dozier MD  Department of Hospital Medicine   [facility]

## 2024-07-27 PROCEDURE — 21400001 HC TELEMETRY ROOM

## 2024-07-27 PROCEDURE — 25000003 PHARM REV CODE 250: Performed by: PHYSICIAN ASSISTANT

## 2024-07-27 PROCEDURE — 63600175 PHARM REV CODE 636 W HCPCS: Performed by: HOSPITALIST

## 2024-07-27 PROCEDURE — 63600175 PHARM REV CODE 636 W HCPCS: Performed by: STUDENT IN AN ORGANIZED HEALTH CARE EDUCATION/TRAINING PROGRAM

## 2024-07-27 PROCEDURE — 94761 N-INVAS EAR/PLS OXIMETRY MLT: CPT

## 2024-07-27 PROCEDURE — 25000003 PHARM REV CODE 250: Performed by: HOSPITALIST

## 2024-07-27 PROCEDURE — 25000003 PHARM REV CODE 250: Performed by: STUDENT IN AN ORGANIZED HEALTH CARE EDUCATION/TRAINING PROGRAM

## 2024-07-27 RX ORDER — BISACODYL 5 MG
5 TABLET, DELAYED RELEASE (ENTERIC COATED) ORAL ONCE
Status: COMPLETED | OUTPATIENT
Start: 2024-07-27 | End: 2024-07-27

## 2024-07-27 RX ORDER — HYDROMORPHONE HYDROCHLORIDE 1 MG/ML
1.5 INJECTION, SOLUTION INTRAMUSCULAR; INTRAVENOUS; SUBCUTANEOUS EVERY 4 HOURS PRN
Status: DISCONTINUED | OUTPATIENT
Start: 2024-07-27 | End: 2024-07-29

## 2024-07-27 RX ADMIN — OXYCODONE HYDROCHLORIDE 10 MG: 10 TABLET ORAL at 10:07

## 2024-07-27 RX ADMIN — SODIUM CHLORIDE: 9 INJECTION, SOLUTION INTRAVENOUS at 07:07

## 2024-07-27 RX ADMIN — PROMETHAZINE HYDROCHLORIDE 12.5 MG: 25 INJECTION INTRAMUSCULAR; INTRAVENOUS at 11:07

## 2024-07-27 RX ADMIN — HYDROMORPHONE HYDROCHLORIDE 2 MG: 1 INJECTION, SOLUTION INTRAMUSCULAR; INTRAVENOUS; SUBCUTANEOUS at 03:07

## 2024-07-27 RX ADMIN — PANTOPRAZOLE SODIUM 40 MG: 40 TABLET, DELAYED RELEASE ORAL at 10:07

## 2024-07-27 RX ADMIN — HYDROMORPHONE HYDROCHLORIDE 1.5 MG: 1 INJECTION, SOLUTION INTRAMUSCULAR; INTRAVENOUS; SUBCUTANEOUS at 04:07

## 2024-07-27 RX ADMIN — BICTEGRAVIR SODIUM, EMTRICITABINE, AND TENOFOVIR ALAFENAMIDE FUMARATE 1 TABLET: 50; 200; 25 TABLET ORAL at 10:07

## 2024-07-27 RX ADMIN — PROMETHAZINE HYDROCHLORIDE 12.5 MG: 25 INJECTION INTRAMUSCULAR; INTRAVENOUS at 09:07

## 2024-07-27 RX ADMIN — HYDROMORPHONE HYDROCHLORIDE 1.5 MG: 1 INJECTION, SOLUTION INTRAMUSCULAR; INTRAVENOUS; SUBCUTANEOUS at 12:07

## 2024-07-27 RX ADMIN — BISACODYL 5 MG: 5 TABLET, COATED ORAL at 10:07

## 2024-07-27 RX ADMIN — HYDROMORPHONE HYDROCHLORIDE 1.5 MG: 1 INJECTION, SOLUTION INTRAMUSCULAR; INTRAVENOUS; SUBCUTANEOUS at 08:07

## 2024-07-27 RX ADMIN — HYDROMORPHONE HYDROCHLORIDE 2 MG: 1 INJECTION, SOLUTION INTRAMUSCULAR; INTRAVENOUS; SUBCUTANEOUS at 07:07

## 2024-07-27 NOTE — PROGRESS NOTES
"[facility]  Progress Note    Patient Name: Tasia Cardona  MRN: [id number]  Patient Class: IP- Inpatient   Admission Date: 7/23/2024  Length of Stay: 4 days  Attending Physician: Oriana Dozier MD  Primary Care Provider: Pina Jones NP        Subjective:     Principal Problem:Acute on chronic pancreatitis        HPI:  Mr. Tasia Cardona is a 24 y.o. male, with PMH of chronic pancreatitis, necrotizing pancreatitis, pancreatic pseudocyst, alcohol use disorder, opioid dependence with opoid induced constipation, Corinne-Chauhan tear, HIV, asthma, who presented to [facility] ED on 7/23/24 due to epigastric abdominal pain beginning the morning of presentation. He notes the pain is occasionally "sharp and squeezing" in nature, and radiates to the LLQ. Associated symptoms include nausea, vomiting, chills, He denied UTI symptoms, constipation ,diarrhea. He states he was scheduled for EGD this Friday. Reports he has not drank alcohol x 30 days. He was evaluated in the ED with labs showing no leukocytosis or left shift on CBC.  Metabolic panel showed potassium 3.3, with CO2 21, anion gap 12 0, and ALP of 167 on metabolic panel.  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 He was treated in the ED with 1L LR, potassium, zofran, bentyl, morphine, and dilaudid. He was admitted to inpatient status.     Overview/Hospital Course:  7/24 - Reported feer and night sweats at home PTA.   Not on antibiotics yet. Blood culture ordered.  No SIRS since admission.   Per IR, The collection is deep and surrounded by varices so there is not a good percutaneous window for drainage.  AES consulted to see if they can offer another approach or continue to follow it and " monitor.  He is going to procedure today. I discussed what to expect.  Blood pressure 139/77, VSS. H/h stable,- 11.5; K 3.5  PER AES: The fluid collection still too complex to be drained, appears to have a lot of septations. there was small amount of bright fluid that could have been blood. He could have had a small component of hemorrhagic pancreatitis as cause of the pain. Recommend CTA to eval that area.    Interval History:     Able to tolerate diet better. Continues to have abdominal pain.   Dilaudid dose decreased to 1.5mg   Ducolax ordered for constipation      Review of Systems   Constitutional: Negative.    Respiratory: Negative.     Cardiovascular: Negative.    Gastrointestinal:  Positive for abdominal pain, constipation and nausea. Negative for diarrhea and vomiting.   Musculoskeletal: Negative.      Objective:     Vital Signs (Most Recent):  Temp: 98.1 °F (36.7 °C) (07/27/24 1131)  Pulse: 82 (07/27/24 1131)  Resp: 18 (07/27/24 1212)  BP: 133/87 (07/27/24 1131)  SpO2: 99 % (07/27/24 1131) Vital Signs (24h Range):  Temp:  [97.5 °F (36.4 °C)-98.1 °F (36.7 °C)] 98.1 °F (36.7 °C)  Pulse:  [76-87] 82  Resp:  [16-18] 18  SpO2:  [95 %-99 %] 99 %  BP: (116-133)/(78-95) 133/87     Weight: 65.8 kg (145 lb 1 oz)  Body mass index is 20.81 kg/m².  No intake or output data in the 24 hours ending 07/27/24 1503      Physical Exam  Constitutional:       General: He is not in acute distress.     Appearance: He is not ill-appearing.   Cardiovascular:      Pulses: Normal pulses.      Heart sounds: Normal heart sounds.   Pulmonary:      Effort: No respiratory distress.      Breath sounds: Normal breath sounds.   Abdominal:      General: Abdomen is flat.      Palpations: Abdomen is soft.      Tenderness: There is abdominal tenderness. There is no guarding.   Musculoskeletal:      Right lower leg: No edema.      Left lower leg: No edema.   Neurological:      Mental Status: He is alert.             Significant Labs: All  "pertinent labs within the past 24 hours have been reviewed.  BMP:   Recent Labs   Lab 07/26/24  0534   *      K 3.8      CO2 26   BUN 9   CREATININE 0.8   CALCIUM 8.4*   MG 1.8     CBC:   Recent Labs   Lab 07/26/24  0533   WBC 6.44   HGB 10.8*   HCT 35.7*        CMP:   Recent Labs   Lab 07/26/24  0534      K 3.8      CO2 26   *   BUN 9   CREATININE 0.8   CALCIUM 8.4*   ANIONGAP 8       Significant Imaging: I have reviewed all pertinent imaging results/findings within the past 24 hours.    Assessment/Plan:      * Acute on chronic pancreatitis  - Mr. Tasia Cardona presents with acute worsening of chronic pancreatitis   - he has h/o alcohol use, denies use x 30 days   - ED labs without elevated lipase   - CT shows mild acute pancreatitis with new/interval enlargement of prior peripancreatic collection along anterior upper aspect of pancreas  - s/p EGD   - no indication for IR drainage of fluid collection  - IV fluids for hydration  - PRN meds for pain/nausea        Peripancreatic fluid collection  - as above in "Acute on Chronic pancreatitis"       Pain management  Hx of opioid dependence with complicates pain management  On tylenol, oxycodone, dilaudid iv      Hypokalemia  Patient has hypokalemia which is Acute and currently worsening. Most recent potassium levels reviewed-   Lab Results   Component Value Date    K 3.3 (L) 07/24/2024   . Will continue potassium replacement per protocol and recheck repeat levels after replacement completed.     Repleted 7/23, 7/24    Alcohol use disorder  - states no use x 30 days   - monitor, will order CIWA scales       Splenic vein thrombosis  - history noted   - CT  re demonstrates this finding      Mild intermittent asthma without complication  - chronic, stable   - monitor   - PRN DuoNebs if needed       HIV infection  - continue home meds   - CD4 1080 10/2023  - follows w/ Infectious Disease (Dr. Velasco)         VTE Risk Mitigation " (From admission, onward)           Ordered     heparin (porcine) injection 5,000 Units  Every 8 hours         07/23/24 2058     IP VTE HIGH RISK PATIENT  Once         07/23/24 2058     Place sequential compression device  Until discontinued         07/23/24 2058                    Discharge Planning   CASTILLO: 7/28/2024     Code Status: Full Code   Is the patient medically ready for discharge?:     Reason for patient still in hospital (select all that apply): Patient trending condition  Discharge Plan A: Home   Discharge Delays: None known at this time              Oriana Dozier MD  Department of Hospital Medicine   [facility]

## 2024-07-27 NOTE — PLAN OF CARE
The pt is accepted by [facility] for SOC Wed or sooner. AVS updated. Discharge plan closed in [facility].    07/27/24 7520   Post-Acute Status   Post-Acute Authorization Home Health   Home Health Status Set-up Complete/Auth obtained   Patient choice form signed by patient/caregiver List with quality metrics by geographic area provided

## 2024-07-27 NOTE — PLAN OF CARE
Problem: Fall Injury Risk  Goal: Absence of Fall and Fall-Related Injury  Outcome: Not Progressing     Problem: Pain Acute  Goal: Optimal Pain Control and Function  Outcome: Not Progressing     Problem: Pancreatitis  Goal: Fluid and Electrolyte Balance  Outcome: Not Progressing  Goal: Absence of Infection Signs and Symptoms  Outcome: Not Progressing  Goal: Optimal Nutrition Delivery  Outcome: Not Progressing  Goal: Optimal Pain Control and Function  Outcome: Not Progressing

## 2024-07-27 NOTE — SUBJECTIVE & OBJECTIVE
Interval History:     Able to tolerate diet better. Continues to have abdominal pain.   Dilaudid dose decreased to 1.5mg   Ducolax ordered for constipation      Review of Systems   Constitutional: Negative.    Respiratory: Negative.     Cardiovascular: Negative.    Gastrointestinal:  Positive for abdominal pain, constipation and nausea. Negative for diarrhea and vomiting.   Musculoskeletal: Negative.      Objective:     Vital Signs (Most Recent):  Temp: 98.1 °F (36.7 °C) (07/27/24 1131)  Pulse: 82 (07/27/24 1131)  Resp: 18 (07/27/24 1212)  BP: 133/87 (07/27/24 1131)  SpO2: 99 % (07/27/24 1131) Vital Signs (24h Range):  Temp:  [97.5 °F (36.4 °C)-98.1 °F (36.7 °C)] 98.1 °F (36.7 °C)  Pulse:  [76-87] 82  Resp:  [16-18] 18  SpO2:  [95 %-99 %] 99 %  BP: (116-133)/(78-95) 133/87     Weight: 65.8 kg (145 lb 1 oz)  Body mass index is 20.81 kg/m².  No intake or output data in the 24 hours ending 07/27/24 1503      Physical Exam  Constitutional:       General: He is not in acute distress.     Appearance: He is not ill-appearing.   Cardiovascular:      Pulses: Normal pulses.      Heart sounds: Normal heart sounds.   Pulmonary:      Effort: No respiratory distress.      Breath sounds: Normal breath sounds.   Abdominal:      General: Abdomen is flat.      Palpations: Abdomen is soft.      Tenderness: There is abdominal tenderness. There is no guarding.   Musculoskeletal:      Right lower leg: No edema.      Left lower leg: No edema.   Neurological:      Mental Status: He is alert.             Significant Labs: All pertinent labs within the past 24 hours have been reviewed.  BMP:   Recent Labs   Lab 07/26/24  0534   *      K 3.8      CO2 26   BUN 9   CREATININE 0.8   CALCIUM 8.4*   MG 1.8     CBC:   Recent Labs   Lab 07/26/24  0533   WBC 6.44   HGB 10.8*   HCT 35.7*        CMP:   Recent Labs   Lab 07/26/24  0534      K 3.8      CO2 26   *   BUN 9   CREATININE 0.8   CALCIUM 8.4*    ANIONGAP 8       Significant Imaging: I have reviewed all pertinent imaging results/findings within the past 24 hours.

## 2024-07-27 NOTE — ASSESSMENT & PLAN NOTE
- Mr. Tasia Cardona presents with acute worsening of chronic pancreatitis   - he has h/o alcohol use, denies use x 30 days   - ED labs without elevated lipase   - CT shows mild acute pancreatitis with new/interval enlargement of prior peripancreatic collection along anterior upper aspect of pancreas  - s/p EGD   - no indication for IR drainage of fluid collection  - IV fluids for hydration  - PRN meds for pain/nausea

## 2024-07-27 NOTE — SUBJECTIVE & OBJECTIVE
Interval History:   Continues to have abdominal pain. Able to tolerate PO diet a little better today. Continued on supportive care    Review of Systems   Constitutional:  Positive for appetite change. Negative for chills and fever.   HENT: Negative.     Respiratory: Negative.     Cardiovascular: Negative.    Gastrointestinal:  Positive for abdominal pain and nausea. Negative for vomiting.   Genitourinary: Negative.    Musculoskeletal: Negative.      Objective:     Vital Signs (Most Recent):  Temp: 97.6 °F (36.4 °C) (07/26/24 1940)  Pulse: 87 (07/26/24 1940)  Resp: 16 (07/26/24 1940)  BP: 126/83 (07/26/24 1940)  SpO2: 95 % (07/26/24 1940) Vital Signs (24h Range):  Temp:  [97.6 °F (36.4 °C)-98 °F (36.7 °C)] 97.6 °F (36.4 °C)  Pulse:  [78-93] 87  Resp:  [16-20] 16  SpO2:  [95 %-98 %] 95 %  BP: (120-142)/(74-90) 126/83     Weight: 65.8 kg (145 lb 1 oz)  Body mass index is 20.81 kg/m².    Intake/Output Summary (Last 24 hours) at 7/26/2024 2040  Last data filed at 7/26/2024 1300  Gross per 24 hour   Intake 590 ml   Output --   Net 590 ml         Physical Exam  Constitutional:       General: He is not in acute distress.     Appearance: He is not ill-appearing.   Cardiovascular:      Pulses: Normal pulses.      Heart sounds: Normal heart sounds.   Pulmonary:      Effort: Pulmonary effort is normal. No respiratory distress.      Breath sounds: Normal breath sounds.   Abdominal:      General: Abdomen is flat.      Palpations: Abdomen is soft.      Tenderness: There is abdominal tenderness. There is no guarding.   Musculoskeletal:      Right lower leg: No edema.      Left lower leg: No edema.   Neurological:      Mental Status: He is alert.             Significant Labs: All pertinent labs within the past 24 hours have been reviewed.  BMP:   Recent Labs   Lab 07/26/24  0534   *      K 3.8      CO2 26   BUN 9   CREATININE 0.8   CALCIUM 8.4*   MG 1.8     CBC:   Recent Labs   Lab 07/25/24  0358 07/26/24  0533    WBC 6.78 6.44   HGB 11.6* 10.8*   HCT 38.2* 35.7*    213     CMP:   Recent Labs   Lab 07/25/24  0358 07/26/24  0534   * 140   K 4.3 3.8    106   CO2 24 26   * 117*   BUN 6 9   CREATININE 0.8 0.8   CALCIUM 8.8 8.4*   ANIONGAP 8 8       Significant Imaging: I have reviewed all pertinent imaging results/findings within the past 24 hours.

## 2024-07-27 NOTE — PROGRESS NOTES
"[facility] Medicine  Progress Note    Patient Name: Tasia Cardona  MRN: [id number]  Patient Class: IP- Inpatient   Admission Date: 7/23/2024  Length of Stay: 3 days  Attending Physician: Oriana Dozier MD  Primary Care Provider: Pina Jones NP        Subjective:     Principal Problem:Acute on chronic pancreatitis        HPI:  Mr. Tasia Cardona is a 24 y.o. male, with PMH of chronic pancreatitis, necrotizing pancreatitis, pancreatic pseudocyst, alcohol use disorder, opioid dependence with opoid induced constipation, Corinne-Chauhan tear, HIV, asthma, who presented to [facility] ED on 7/23/24 due to epigastric abdominal pain beginning the morning of presentation. He notes the pain is occasionally "sharp and squeezing" in nature, and radiates to the LLQ. Associated symptoms include nausea, vomiting, chills, He denied UTI symptoms, constipation ,diarrhea. He states he was scheduled for EGD this Friday. Reports he has not drank alcohol x 30 days. He was evaluated in the ED with labs showing no leukocytosis or left shift on CBC.  Metabolic panel showed potassium 3.3, with CO2 21, anion gap 12 0, and ALP of 167 on metabolic panel.  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 He was treated in the ED with 1L LR, potassium, zofran, bentyl, morphine, and dilaudid. He was admitted to inpatient status.     Overview/Hospital Course:  7/24 - Reported feer and night sweats at home PTA.   Not on antibiotics yet. Blood culture ordered.  No SIRS since admission.   Per IR, The collection is deep and surrounded by varices so there is not a good percutaneous window for drainage.  AES consulted to see if they can offer another approach or continue to follow it and " monitor.  He is going to procedure today. I discussed what to expect.  Blood pressure 139/77, VSS. H/h stable,- 11.5; K 3.5  PER AES: The fluid collection still too complex to be drained, appears to have a lot of septations. there was small amount of bright fluid that could have been blood. He could have had a small component of hemorrhagic pancreatitis as cause of the pain. Recommend CTA to eval that area.    Interval History:   Continues to have abdominal pain. Able to tolerate PO diet a little better today. Continued on supportive care    Review of Systems   Constitutional:  Positive for appetite change. Negative for chills and fever.   HENT: Negative.     Respiratory: Negative.     Cardiovascular: Negative.    Gastrointestinal:  Positive for abdominal pain and nausea. Negative for vomiting.   Genitourinary: Negative.    Musculoskeletal: Negative.      Objective:     Vital Signs (Most Recent):  Temp: 97.6 °F (36.4 °C) (07/26/24 1940)  Pulse: 87 (07/26/24 1940)  Resp: 16 (07/26/24 1940)  BP: 126/83 (07/26/24 1940)  SpO2: 95 % (07/26/24 1940) Vital Signs (24h Range):  Temp:  [97.6 °F (36.4 °C)-98 °F (36.7 °C)] 97.6 °F (36.4 °C)  Pulse:  [78-93] 87  Resp:  [16-20] 16  SpO2:  [95 %-98 %] 95 %  BP: (120-142)/(74-90) 126/83     Weight: 65.8 kg (145 lb 1 oz)  Body mass index is 20.81 kg/m².    Intake/Output Summary (Last 24 hours) at 7/26/2024 2040  Last data filed at 7/26/2024 1300  Gross per 24 hour   Intake 590 ml   Output --   Net 590 ml         Physical Exam  Constitutional:       General: He is not in acute distress.     Appearance: He is not ill-appearing.   Cardiovascular:      Pulses: Normal pulses.      Heart sounds: Normal heart sounds.   Pulmonary:      Effort: Pulmonary effort is normal. No respiratory distress.      Breath sounds: Normal breath sounds.   Abdominal:      General: Abdomen is flat.      Palpations: Abdomen is soft.      Tenderness: There is abdominal tenderness. There is no guarding.  "  Musculoskeletal:      Right lower leg: No edema.      Left lower leg: No edema.   Neurological:      Mental Status: He is alert.             Significant Labs: All pertinent labs within the past 24 hours have been reviewed.  BMP:   Recent Labs   Lab 07/26/24  0534   *      K 3.8      CO2 26   BUN 9   CREATININE 0.8   CALCIUM 8.4*   MG 1.8     CBC:   Recent Labs   Lab 07/25/24  0358 07/26/24  0533   WBC 6.78 6.44   HGB 11.6* 10.8*   HCT 38.2* 35.7*    213     CMP:   Recent Labs   Lab 07/25/24  0358 07/26/24  0534   * 140   K 4.3 3.8    106   CO2 24 26   * 117*   BUN 6 9   CREATININE 0.8 0.8   CALCIUM 8.8 8.4*   ANIONGAP 8 8       Significant Imaging: I have reviewed all pertinent imaging results/findings within the past 24 hours.    Assessment/Plan:      * Acute on chronic pancreatitis  - Mr. Tasia Cardona presents with acute worsening of chronic pancreatitis   - he has h/o alcohol use, denies use x 30 days   - ED labs without elevated lipase   - CT shows mild acute pancreatitis with new/interval enlargement of prior peripancreatic collection along anterior upper aspect of pancreas  - s/p EGD   - no indication for IR drainage of fluid collection  - IV fluids for hydration  - PRN meds for pain/nausea    7/24- IR is unable to approach fluid collection. There are surrounding varicies. Consider AES or surgery consult if needed.       Peripancreatic fluid collection  - as above in "Acute on Chronic pancreatitis"       Pain management  Hx of opioid dependence with complicates pain management  On tylenol, oxycodone, dilaudid iv      Hypokalemia  Patient has hypokalemia which is Acute and currently worsening. Most recent potassium levels reviewed-   Lab Results   Component Value Date    K 3.3 (L) 07/24/2024   . Will continue potassium replacement per protocol and recheck repeat levels after replacement completed.     Repleted 7/23, 7/24    Alcohol use disorder  - states no use x " 30 days   - monitor, will order CIWA scales       Splenic vein thrombosis  - history noted   - CT  re demonstrates this finding      Mild intermittent asthma without complication  - chronic, stable   - monitor   - PRN DuoNebs if needed       HIV infection  - continue home meds   - CD4 1080 10/2023  - follows w/ Infectious Disease (Dr. Velasco)         VTE Risk Mitigation (From admission, onward)           Ordered     heparin (porcine) injection 5,000 Units  Every 8 hours         07/23/24 2058     IP VTE HIGH RISK PATIENT  Once         07/23/24 2058     Place sequential compression device  Until discontinued         07/23/24 2058                    Discharge Planning   CASTILLO: 7/28/2024     Code Status: Full Code   Is the patient medically ready for discharge?:     Reason for patient still in hospital (select all that apply): Patient trending condition  Discharge Plan A: Home   Discharge Delays: None known at this time              Oriana Dozier MD  Department of Hospital Medicine   [facility] - Med Surg

## 2024-07-28 LAB
ANION GAP SERPL CALC-SCNC: 6 MMOL/L (ref 8–16)
BASOPHILS # BLD AUTO: 0.05 K/UL (ref 0–0.2)
BASOPHILS NFR BLD: 0.8 % (ref 0–1.9)
BUN SERPL-MCNC: 12 MG/DL (ref 6–20)
CALCIUM SERPL-MCNC: 8.8 MG/DL (ref 8.7–10.5)
CHLORIDE SERPL-SCNC: 106 MMOL/L (ref 95–110)
CO2 SERPL-SCNC: 24 MMOL/L (ref 23–29)
CREAT SERPL-MCNC: 0.7 MG/DL (ref 0.5–1.4)
DIFFERENTIAL METHOD BLD: ABNORMAL
EOSINOPHIL # BLD AUTO: 0.3 K/UL (ref 0–0.5)
EOSINOPHIL NFR BLD: 5 % (ref 0–8)
ERYTHROCYTE [DISTWIDTH] IN BLOOD BY AUTOMATED COUNT: 18.8 % (ref 11.5–14.5)
EST. GFR  (NO RACE VARIABLE): >60 ML/MIN/1.73 M^2
GLUCOSE SERPL-MCNC: 107 MG/DL (ref 70–110)
HCT VFR BLD AUTO: 41.3 % (ref 40–54)
HGB BLD-MCNC: 12.1 G/DL (ref 14–18)
IMM GRANULOCYTES # BLD AUTO: 0.04 K/UL (ref 0–0.04)
IMM GRANULOCYTES NFR BLD AUTO: 0.6 % (ref 0–0.5)
LYMPHOCYTES # BLD AUTO: 2.8 K/UL (ref 1–4.8)
LYMPHOCYTES NFR BLD: 44.4 % (ref 18–48)
MCH RBC QN AUTO: 25.5 PG (ref 27–31)
MCHC RBC AUTO-ENTMCNC: 29.3 G/DL (ref 32–36)
MCV RBC AUTO: 87 FL (ref 82–98)
MONOCYTES # BLD AUTO: 0.6 K/UL (ref 0.3–1)
MONOCYTES NFR BLD: 9.2 % (ref 4–15)
NEUTROPHILS # BLD AUTO: 2.5 K/UL (ref 1.8–7.7)
NEUTROPHILS NFR BLD: 40 % (ref 38–73)
NRBC BLD-RTO: 0 /100 WBC
PLATELET # BLD AUTO: 254 K/UL (ref 150–450)
PMV BLD AUTO: 9.5 FL (ref 9.2–12.9)
POTASSIUM SERPL-SCNC: 4 MMOL/L (ref 3.5–5.1)
RBC # BLD AUTO: 4.74 M/UL (ref 4.6–6.2)
SODIUM SERPL-SCNC: 136 MMOL/L (ref 136–145)
WBC # BLD AUTO: 6.22 K/UL (ref 3.9–12.7)

## 2024-07-28 PROCEDURE — 25000003 PHARM REV CODE 250: Performed by: STUDENT IN AN ORGANIZED HEALTH CARE EDUCATION/TRAINING PROGRAM

## 2024-07-28 PROCEDURE — 36415 COLL VENOUS BLD VENIPUNCTURE: CPT | Performed by: STUDENT IN AN ORGANIZED HEALTH CARE EDUCATION/TRAINING PROGRAM

## 2024-07-28 PROCEDURE — 63600175 PHARM REV CODE 636 W HCPCS: Performed by: HOSPITALIST

## 2024-07-28 PROCEDURE — 80048 BASIC METABOLIC PNL TOTAL CA: CPT | Performed by: STUDENT IN AN ORGANIZED HEALTH CARE EDUCATION/TRAINING PROGRAM

## 2024-07-28 PROCEDURE — 25000003 PHARM REV CODE 250: Performed by: HOSPITALIST

## 2024-07-28 PROCEDURE — 63600175 PHARM REV CODE 636 W HCPCS: Performed by: STUDENT IN AN ORGANIZED HEALTH CARE EDUCATION/TRAINING PROGRAM

## 2024-07-28 PROCEDURE — 85025 COMPLETE CBC W/AUTO DIFF WBC: CPT | Performed by: STUDENT IN AN ORGANIZED HEALTH CARE EDUCATION/TRAINING PROGRAM

## 2024-07-28 PROCEDURE — 25000003 PHARM REV CODE 250: Performed by: PHYSICIAN ASSISTANT

## 2024-07-28 PROCEDURE — 21400001 HC TELEMETRY ROOM

## 2024-07-28 RX ORDER — PROMETHAZINE HYDROCHLORIDE 12.5 MG/1
12.5 TABLET ORAL EVERY 6 HOURS PRN
Status: DISCONTINUED | OUTPATIENT
Start: 2024-07-28 | End: 2024-07-29 | Stop reason: HOSPADM

## 2024-07-28 RX ADMIN — HYDROMORPHONE HYDROCHLORIDE 1.5 MG: 1 INJECTION, SOLUTION INTRAMUSCULAR; INTRAVENOUS; SUBCUTANEOUS at 09:07

## 2024-07-28 RX ADMIN — Medication 6 MG: at 10:07

## 2024-07-28 RX ADMIN — PROMETHAZINE HYDROCHLORIDE 12.5 MG: 12.5 TABLET ORAL at 12:07

## 2024-07-28 RX ADMIN — HYDROMORPHONE HYDROCHLORIDE 1.5 MG: 1 INJECTION, SOLUTION INTRAMUSCULAR; INTRAVENOUS; SUBCUTANEOUS at 06:07

## 2024-07-28 RX ADMIN — HYDROMORPHONE HYDROCHLORIDE 1.5 MG: 1 INJECTION, SOLUTION INTRAMUSCULAR; INTRAVENOUS; SUBCUTANEOUS at 12:07

## 2024-07-28 RX ADMIN — SENNOSIDES AND DOCUSATE SODIUM 1 TABLET: 50; 8.6 TABLET ORAL at 10:07

## 2024-07-28 RX ADMIN — SODIUM CHLORIDE: 9 INJECTION, SOLUTION INTRAVENOUS at 07:07

## 2024-07-28 RX ADMIN — HYDROMORPHONE HYDROCHLORIDE 1.5 MG: 1 INJECTION, SOLUTION INTRAMUSCULAR; INTRAVENOUS; SUBCUTANEOUS at 01:07

## 2024-07-28 RX ADMIN — HYDROMORPHONE HYDROCHLORIDE 1.5 MG: 1 INJECTION, SOLUTION INTRAMUSCULAR; INTRAVENOUS; SUBCUTANEOUS at 10:07

## 2024-07-28 RX ADMIN — BICTEGRAVIR SODIUM, EMTRICITABINE, AND TENOFOVIR ALAFENAMIDE FUMARATE 1 TABLET: 50; 200; 25 TABLET ORAL at 09:07

## 2024-07-28 RX ADMIN — HYDROMORPHONE HYDROCHLORIDE 1.5 MG: 1 INJECTION, SOLUTION INTRAMUSCULAR; INTRAVENOUS; SUBCUTANEOUS at 04:07

## 2024-07-28 RX ADMIN — PROMETHAZINE HYDROCHLORIDE 12.5 MG: 25 INJECTION INTRAMUSCULAR; INTRAVENOUS at 03:07

## 2024-07-28 RX ADMIN — OXYCODONE HYDROCHLORIDE 10 MG: 10 TABLET ORAL at 07:07

## 2024-07-28 RX ADMIN — Medication 6 MG: at 12:07

## 2024-07-28 RX ADMIN — PANTOPRAZOLE SODIUM 40 MG: 40 TABLET, DELAYED RELEASE ORAL at 09:07

## 2024-07-28 NOTE — NURSING
Pt refused to have Heparin shot d/t abd discomfort and nausea.    0530   Changed a new NS. 8/10 Pain, given prn 1.5mg of Dilaudid.

## 2024-07-29 VITALS
HEIGHT: 70 IN | SYSTOLIC BLOOD PRESSURE: 112 MMHG | BODY MASS INDEX: 22.15 KG/M2 | TEMPERATURE: 98 F | DIASTOLIC BLOOD PRESSURE: 77 MMHG | WEIGHT: 154.75 LBS | RESPIRATION RATE: 17 BRPM | HEART RATE: 91 BPM | OXYGEN SATURATION: 98 %

## 2024-07-29 LAB — BACTERIA BLD CULT: NORMAL

## 2024-07-29 PROCEDURE — 63600175 PHARM REV CODE 636 W HCPCS: Performed by: STUDENT IN AN ORGANIZED HEALTH CARE EDUCATION/TRAINING PROGRAM

## 2024-07-29 PROCEDURE — 25000003 PHARM REV CODE 250: Performed by: PHYSICIAN ASSISTANT

## 2024-07-29 RX ADMIN — PANTOPRAZOLE SODIUM 40 MG: 40 TABLET, DELAYED RELEASE ORAL at 09:07

## 2024-07-29 RX ADMIN — BICTEGRAVIR SODIUM, EMTRICITABINE, AND TENOFOVIR ALAFENAMIDE FUMARATE 1 TABLET: 50; 200; 25 TABLET ORAL at 09:07

## 2024-07-29 RX ADMIN — OXYCODONE HYDROCHLORIDE 10 MG: 10 TABLET ORAL at 01:07

## 2024-07-29 RX ADMIN — HYDROMORPHONE HYDROCHLORIDE 1.5 MG: 1 INJECTION, SOLUTION INTRAMUSCULAR; INTRAVENOUS; SUBCUTANEOUS at 01:07

## 2024-07-29 RX ADMIN — HYDROMORPHONE HYDROCHLORIDE 1.5 MG: 1 INJECTION, SOLUTION INTRAMUSCULAR; INTRAVENOUS; SUBCUTANEOUS at 06:07

## 2024-07-29 NOTE — PROGRESS NOTES
"[facility] Medicine  Progress Note    Patient Name: Tasia Cardona  MRN: [id number]  Patient Class: IP- Inpatient   Admission Date: 7/23/2024  Length of Stay: 5 days  Attending Physician: Oriana Dozier MD  Primary Care Provider: Pina Jones NP        Subjective:     Principal Problem:Acute on chronic pancreatitis        HPI:  Mr. Tasia Cardona is a 24 y.o. male, with PMH of chronic pancreatitis, necrotizing pancreatitis, pancreatic pseudocyst, alcohol use disorder, opioid dependence with opoid induced constipation, Corinne-Chauhan tear, HIV, asthma, who presented to [facility] ED on 7/23/24 due to epigastric abdominal pain beginning the morning of presentation. He notes the pain is occasionally "sharp and squeezing" in nature, and radiates to the LLQ. Associated symptoms include nausea, vomiting, chills, He denied UTI symptoms, constipation ,diarrhea. He states he was scheduled for EGD this Friday. Reports he has not drank alcohol x 30 days. He was evaluated in the ED with labs showing no leukocytosis or left shift on CBC.  Metabolic panel showed potassium 3.3, with CO2 21, anion gap 12 0, and ALP of 167 on metabolic panel.  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 He was treated in the ED with 1L LR, potassium, zofran, bentyl, morphine, and dilaudid. He was admitted to inpatient status.     Overview/Hospital Course:  7/24 - Reported feer and night sweats at home PTA.   Not on antibiotics yet. Blood culture ordered.  No SIRS since admission.   Per IR, The collection is deep and surrounded by varices so there is not a good percutaneous window for drainage.  AES consulted to see if they can offer another approach or continue to follow it and " monitor.  He is going to procedure today. I discussed what to expect.  Blood pressure 139/77, VSS. H/h stable,- 11.5; K 3.5  PER AES: The fluid collection still too complex to be drained, appears to have a lot of septations. there was small amount of bright fluid that could have been blood. He could have had a small component of hemorrhagic pancreatitis as cause of the pain. Recommend CTA to eval that area.    Interval History:     Able to tolerate food better today. Switch antiemetic to oral.  Does not feel ready to switch pain meds to oral. Will wean off in morning.     Review of Systems   Constitutional: Negative.    Cardiovascular:  Negative for chest pain and leg swelling.   Gastrointestinal:  Positive for abdominal pain and nausea. Negative for constipation, diarrhea and vomiting.   Neurological:  Positive for weakness. Negative for dizziness, numbness and headaches.     Objective:     Vital Signs (Most Recent):  Temp: 98.4 °F (36.9 °C) (07/28/24 1938)  Pulse: 96 (07/28/24 1938)  Resp: 16 (07/28/24 1939)  BP: (!) 135/91 (07/28/24 1938)  SpO2: 96 % (07/28/24 1938) Vital Signs (24h Range):  Temp:  [97.4 °F (36.3 °C)-98.4 °F (36.9 °C)] 98.4 °F (36.9 °C)  Pulse:  [68-96] 96  Resp:  [16-18] 16  SpO2:  [96 %-98 %] 96 %  BP: (113-135)/(71-91) 135/91     Weight: 70.2 kg (154 lb 12.2 oz)  Body mass index is 22.21 kg/m².  No intake or output data in the 24 hours ending 07/28/24 2023      Physical Exam  Constitutional:       General: He is not in acute distress.     Appearance: He is not ill-appearing.   Cardiovascular:      Pulses: Normal pulses.      Heart sounds: Normal heart sounds.   Pulmonary:      Effort: Pulmonary effort is normal. No respiratory distress.   Abdominal:      General: Abdomen is flat.      Palpations: Abdomen is soft.      Tenderness: There is abdominal tenderness.   Musculoskeletal:      Right lower leg: No edema.      Left lower leg: No edema.   Neurological:      General: No focal deficit  "present.      Mental Status: He is alert.             Significant Labs: All pertinent labs within the past 24 hours have been reviewed.  BMP:   Recent Labs   Lab 07/28/24  0543         K 4.0      CO2 24   BUN 12   CREATININE 0.7   CALCIUM 8.8     CBC:   Recent Labs   Lab 07/28/24  0543   WBC 6.22   HGB 12.1*   HCT 41.3        CMP:   Recent Labs   Lab 07/28/24  0543      K 4.0      CO2 24      BUN 12   CREATININE 0.7   CALCIUM 8.8   ANIONGAP 6*       Significant Imaging: I have reviewed all pertinent imaging results/findings within the past 24 hours.    Assessment/Plan:      * Acute on chronic pancreatitis  - Mr. Tasia Cardona presents with acute worsening of chronic pancreatitis   - he has h/o alcohol use, denies use x 30 days   - ED labs without elevated lipase - bc of chronic pancreatitis  - CT shows mild acute pancreatitis with new/interval enlargement of prior peripancreatic collection along anterior upper aspect of pancreas  - s/p EGD   - no indication for IR drainage of fluid collection  - IV fluids for hydration  - PRN meds for pain/nausea        Peripancreatic fluid collection  - as above in "Acute on Chronic pancreatitis"       Pain management  Hx of opioid dependence with complicates pain management  On tylenol, oxycodone, dilaudid iv      Hypokalemia  Patient has hypokalemia which is Acute and currently worsening. Most recent potassium levels reviewed-   Lab Results   Component Value Date    K 3.3 (L) 07/24/2024   . Will continue potassium replacement per protocol and recheck repeat levels after replacement completed.     Repleted 7/23, 7/24    Alcohol use disorder  - states no use x 30 days   - monitor, will order CIWA scales       Splenic vein thrombosis  - history noted   - CT  re demonstrates this finding      Mild intermittent asthma without complication  - chronic, stable   - monitor   - PRN DuoNebs if needed       HIV infection  - continue home meds   - " CD4 1080 10/2023  - follows w/ Infectious Disease (Dr. Velasco)         VTE Risk Mitigation (From admission, onward)           Ordered     heparin (porcine) injection 5,000 Units  Every 8 hours         07/23/24 2058     IP VTE HIGH RISK PATIENT  Once         07/23/24 2058     Place sequential compression device  Until discontinued         07/23/24 2058                    Discharge Planning   CASTILLO: 7/28/2024     Code Status: Full Code   Is the patient medically ready for discharge?:     Reason for patient still in hospital (select all that apply): Patient trending condition  Discharge Plan A: Home   Discharge Delays: None known at this time              Oriana Dozier MD  Department of Hospital Medicine   [facility] Surg

## 2024-07-29 NOTE — DISCHARGE INSTRUCTIONS
Please follow up outpatient with PCP, gastroenterology, and infectious disease.   Referrals placed    Please take tylenol for pain control.       Please return to ED if you develop nausea/vomiting, abdominal pain, not able to tolerate oral food.

## 2024-07-29 NOTE — ASSESSMENT & PLAN NOTE
- Mr. Tasia Cardona presents with acute worsening of chronic pancreatitis   - he has h/o alcohol use, denies use x 30 days   - ED labs without elevated lipase - bc of chronic pancreatitis  - CT shows mild acute pancreatitis with new/interval enlargement of prior peripancreatic collection along anterior upper aspect of pancreas  - s/p EGD   - no indication for IR drainage of fluid collection  - IV fluids for hydration  - PRN meds for pain/nausea

## 2024-07-29 NOTE — NURSING
Patient's IV was removed. Patient will be discharged to go home. Contacted  to arrange a lyft ride home. Will continue to monitor patient.

## 2024-07-29 NOTE — PLAN OF CARE
[facility] - Med Surg  Discharge Final Note    Primary Care Provider: Pina Jones NP    Expected Discharge Date: 7/29/2024      Patient discharged home with no needs via lyft ride. Follow up appointment scheduled and placed in AVS. Discharge Plan A and Plan B have been determined by review of patient's clinical status, future medical and therapeutic needs, and coverage/benefits for post-acute care in coordination with multidisciplinary team members.    Name and number  Brenda, [phone]  [employer]  [address] [employer] [address]  License plate  [email or web address]  Final Discharge Note (most recent)       Final Note - 07/29/24 1442          Final Note    Assessment Type Final Discharge Note (P)      Anticipated Discharge Disposition Home or Self Care (P)      Hospital Resources/Appts/Education Provided Appointments scheduled and added to AVS (P)         Post-Acute Status    Coverage Medicaid- [employer] (P)      Discharge Delays None known at this time (P)                      Important Message from Medicare             Contact Info       Pina Jones NP   Specialty: Family Medicine   Relationship: PCP - General    [address]  [facility] [address]   Phone: [phone]       Next Steps: Follow up            NASIM Baird  Case Management  [phone]

## 2024-07-29 NOTE — PLAN OF CARE
Problem: Fall Injury Risk  Goal: Absence of Fall and Fall-Related Injury  Outcome: Met     Problem: Pain Acute  Goal: Optimal Pain Control and Function  Outcome: Met     Problem: Pancreatitis  Goal: Fluid and Electrolyte Balance  Outcome: Met  Goal: Absence of Infection Signs and Symptoms  Outcome: Met  Goal: Optimal Nutrition Delivery  Outcome: Met  Goal: Optimal Pain Control and Function  Outcome: Met

## 2024-07-29 NOTE — SUBJECTIVE & OBJECTIVE
Interval History:     Able to tolerate food better today. Switch antiemetic to oral.  Does not feel ready to switch pain meds to oral. Will wean off in morning.     Review of Systems   Constitutional: Negative.    Cardiovascular:  Negative for chest pain and leg swelling.   Gastrointestinal:  Positive for abdominal pain and nausea. Negative for constipation, diarrhea and vomiting.   Neurological:  Positive for weakness. Negative for dizziness, numbness and headaches.     Objective:     Vital Signs (Most Recent):  Temp: 98.4 °F (36.9 °C) (07/28/24 1938)  Pulse: 96 (07/28/24 1938)  Resp: 16 (07/28/24 1939)  BP: (!) 135/91 (07/28/24 1938)  SpO2: 96 % (07/28/24 1938) Vital Signs (24h Range):  Temp:  [97.4 °F (36.3 °C)-98.4 °F (36.9 °C)] 98.4 °F (36.9 °C)  Pulse:  [68-96] 96  Resp:  [16-18] 16  SpO2:  [96 %-98 %] 96 %  BP: (113-135)/(71-91) 135/91     Weight: 70.2 kg (154 lb 12.2 oz)  Body mass index is 22.21 kg/m².  No intake or output data in the 24 hours ending 07/28/24 2023      Physical Exam  Constitutional:       General: He is not in acute distress.     Appearance: He is not ill-appearing.   Cardiovascular:      Pulses: Normal pulses.      Heart sounds: Normal heart sounds.   Pulmonary:      Effort: Pulmonary effort is normal. No respiratory distress.   Abdominal:      General: Abdomen is flat.      Palpations: Abdomen is soft.      Tenderness: There is abdominal tenderness.   Musculoskeletal:      Right lower leg: No edema.      Left lower leg: No edema.   Neurological:      General: No focal deficit present.      Mental Status: He is alert.             Significant Labs: All pertinent labs within the past 24 hours have been reviewed.  BMP:   Recent Labs   Lab 07/28/24  0543         K 4.0      CO2 24   BUN 12   CREATININE 0.7   CALCIUM 8.8     CBC:   Recent Labs   Lab 07/28/24  0543   WBC 6.22   HGB 12.1*   HCT 41.3        CMP:   Recent Labs   Lab 07/28/24  0543      K 4.0       CO2 24      BUN 12   CREATININE 0.7   CALCIUM 8.8   ANIONGAP 6*       Significant Imaging: I have reviewed all pertinent imaging results/findings within the past 24 hours.

## 2024-07-29 NOTE — NURSING
Refused to take Heparin shot, pt was asking for maintaining continuous IV fluids until it finishes, MD aware of NS IV is running at 75 ml/hr until it finishes. Denied nausea, 9/10 pain, given prn 1.5 mg of Dilaudid, senna, melatonin.

## 2024-08-01 NOTE — DISCHARGE SUMMARY
"[facility] Medicine  Discharge Summary      Patient Name: Tasia Cardona  MRN: [id number]  HEATHER: [id number]  Patient Class: IP- Inpatient  Admission Date: 7/23/2024  Hospital Length of Stay: 6 days  Discharge Date and Time: 7/29/2024  2:04 PM  Attending Physician: No att. providers found   Discharging Provider: Oriana Dozier MD  Primary Care Provider: Pina Jones NP  Hospital Medicine Team: [facility] Oriana Dozier MD  Primary Care Team: [facility]    HPI:   Mr. Tasia Cardona is a 24 y.o. male, with PMH of chronic pancreatitis, necrotizing pancreatitis, pancreatic pseudocyst, alcohol use disorder, opioid dependence with opoid induced constipation, Corinne-Chauhan tear, HIV, asthma, who presented to [facility] ED on 7/23/24 due to epigastric abdominal pain beginning the morning of presentation. He notes the pain is occasionally "sharp and squeezing" in nature, and radiates to the LLQ. Associated symptoms include nausea, vomiting, chills, He denied UTI symptoms, constipation ,diarrhea. He states he was scheduled for EGD this Friday. Reports he has not drank alcohol x 30 days. He was evaluated in the ED with labs showing no leukocytosis or left shift on CBC.  Metabolic panel showed potassium 3.3, with CO2 21, anion gap 12 0, and ALP of 167 on metabolic panel.  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 He was treated in the ED with 1L LR, potassium, zofran, bentyl, morphine, and dilaudid. He was admitted to inpatient status.     Procedure(s) (LRB):  EGD (ESOPHAGOGASTRODUODENOSCOPY) (N/A)  ULTRASOUND, UPPER GI TRACT, ENDOSCOPIC (N/A)      Hospital Course:   7/24 - Reported feer and night sweats at home PTA.   Not on antibiotics yet. Blood culture " ordered.  No SIRS since admission.   Per IR, The collection is deep and surrounded by varices so there is not a good percutaneous window for drainage.  AES consulted to see if they can offer another approach or continue to follow it and monitor.  He is going to procedure today. I discussed what to expect.  Blood pressure 139/77, VSS. H/h stable,- 11.5; K 3.5  PER AES: The fluid collection still too complex to be drained, appears to have a lot of septations. there was small amount of bright fluid that could have been blood. He could have had a small component of hemorrhagic pancreatitis as cause of the pain. Recommend CTA to eval that area. CTA was repeated. Collection was not increasing. Evaluated by AES again. CTA reviewed, no signs of hemorrhagic pancreatitis. No indication for IR drainage at this time. Recommend continued supportive care.  Patient was treated with supportive care for acute on chronic pancreatitis. Patient able to tolerate oral diet and abdominal pain improved.  Patient is stable and now ready for discharge.   Referrals placed for gastroenterology, ID, and PCP.   Continued on home medications.        Goals of Care Treatment Preferences:  Code Status: Full Code      SDOH Screening:  The patient was screened for utility difficulties, food insecurity, transport difficulties, housing insecurity, and interpersonal safety and there were no concerns identified this admission.     Consults:   Consults (From admission, onward)          Status Ordering Provider     Inpatient consult to [facility] ([facility])  Once        Provider:  (Not yet assigned)    Completed GWYN MORALES     Inpatient consult to Interventional Radiology  Once        Provider:  (Not yet assigned)    Completed SANFORD HDZ            No new Assessment & Plan notes have been filed under this hospital service since the last note was generated.  Service: Hospital Medicine    Final Active Diagnoses:    Diagnosis Date  Noted POA    PRINCIPAL PROBLEM:  Acute on chronic pancreatitis [K85.90, K86.1] 12/15/2023 Yes    Pain management [R52] 12/27/2023 Yes    Peripancreatic fluid collection [K86.89] 11/27/2023 Yes    Hypokalemia [E87.6] 10/22/2023 Yes    Alcohol use disorder [F10.90] 10/05/2023 Yes    Splenic vein thrombosis [I82.890] 09/20/2023 Yes    Mild intermittent asthma without complication [J45.20] 09/18/2023 Yes     Chronic    HIV infection [B20] 11/02/2021 Yes      Problems Resolved During this Admission:       Discharged Condition: stable    Disposition: Home or Self Care    Follow Up:   Follow-up Information       Pina Jones NP Follow up.    Specialty: Family Medicine  Contact information:  [address]  [facility] [address]  [phone]                           Patient Instructions:      Ambulatory referral/consult to Internal Medicine   Standing Status: Future   Referral Priority: Routine Referral Type: Consultation   Referral Reason: Specialty Services Required   Requested Specialty: Internal Medicine   Number of Visits Requested: 1     Ambulatory referral/consult to Gastroenterology   Standing Status: Future   Referral Priority: Routine Referral Type: Consultation   Referral Reason: Specialty Services Required   Requested Specialty: Gastroenterology   Number of Visits Requested: 1     Ambulatory referral/consult to Infectious Disease   Standing Status: Future   Referral Priority: Routine Referral Type: Consultation   Referral Reason: Specialty Services Required   Requested Specialty: Infectious Diseases   Number of Visits Requested: 1       Significant Diagnostic Studies: N/A    Pending Diagnostic Studies:       None           Medications:  Reconciled Home Medications:      Medication List        CONTINUE taking these medications      BIKTARVY -25 mg (25 kg or greater)  Generic drug: bxvuqyyga-jisleohc-buohlui ala  Take 1 tablet by mouth once daily.     pantoprazole 40 MG tablet  Commonly  known as: PROTONIX  Take 1 tablet (40 mg total) by mouth once daily.     promethazine 25 MG tablet  Commonly known as: PHENERGAN  Take 1 tablet (25 mg total) by mouth every 4 (four) hours.              Indwelling Lines/Drains at time of discharge:   Lines/Drains/Airways       None                   Time spent on the discharge of patient: 45 minutes         Oriana Dozier MD  Department of Hospital Medicine  [facility]

## 2024-08-05 DIAGNOSIS — Z21 ASYMPTOMATIC HIV INFECTION, WITH NO HISTORY OF HIV-RELATED ILLNESS: ICD-10-CM

## 2024-08-05 RX ORDER — BICTEGRAVIR SODIUM, EMTRICITABINE, AND TENOFOVIR ALAFENAMIDE FUMARATE 50; 200; 25 MG/1; MG/1; MG/1
1 TABLET ORAL DAILY
Qty: 30 TABLET | Refills: 1 | Status: ACTIVE | OUTPATIENT
Start: 2024-08-05 | End: 2024-10-04

## 2024-08-31 ENCOUNTER — PATIENT MESSAGE (OUTPATIENT)
Dept: HEMATOLOGY/ONCOLOGY | Facility: CLINIC | Age: 24
End: 2024-08-31
Payer: MEDICAID

## 2024-08-31 DIAGNOSIS — D75.1 POLYCYTHEMIA: ICD-10-CM

## 2024-08-31 DIAGNOSIS — N63.0 BREAST MASS IN MALE: Primary | ICD-10-CM

## 2024-09-05 ENCOUNTER — LAB VISIT (OUTPATIENT)
Dept: LAB | Facility: HOSPITAL | Age: 24
End: 2024-09-05
Payer: MEDICAID

## 2024-09-05 DIAGNOSIS — D75.1 POLYCYTHEMIA: ICD-10-CM

## 2024-09-05 LAB
ALBUMIN SERPL BCP-MCNC: 3.6 G/DL (ref 3.5–5.2)
ALP SERPL-CCNC: 163 U/L (ref 55–135)
ALT SERPL W/O P-5'-P-CCNC: 36 U/L (ref 10–44)
ANION GAP SERPL CALC-SCNC: 12 MMOL/L (ref 8–16)
AST SERPL-CCNC: 45 U/L (ref 10–40)
BASOPHILS # BLD AUTO: 0.06 K/UL (ref 0–0.2)
BASOPHILS NFR BLD: 0.8 % (ref 0–1.9)
BILIRUB SERPL-MCNC: 0.4 MG/DL (ref 0.1–1)
BUN SERPL-MCNC: 4 MG/DL (ref 6–20)
CALCIUM SERPL-MCNC: 8.8 MG/DL (ref 8.7–10.5)
CHLORIDE SERPL-SCNC: 105 MMOL/L (ref 95–110)
CO2 SERPL-SCNC: 21 MMOL/L (ref 23–29)
CREAT SERPL-MCNC: 0.8 MG/DL (ref 0.5–1.4)
DIFFERENTIAL METHOD BLD: ABNORMAL
EOSINOPHIL # BLD AUTO: 0.2 K/UL (ref 0–0.5)
EOSINOPHIL NFR BLD: 2.9 % (ref 0–8)
ERYTHROCYTE [DISTWIDTH] IN BLOOD BY AUTOMATED COUNT: 18.6 % (ref 11.5–14.5)
EST. GFR  (NO RACE VARIABLE): >60 ML/MIN/1.73 M^2
GLUCOSE SERPL-MCNC: 94 MG/DL (ref 70–110)
HCT VFR BLD AUTO: 45.4 % (ref 40–54)
HGB BLD-MCNC: 13.8 G/DL (ref 14–18)
IMM GRANULOCYTES # BLD AUTO: 0.02 K/UL (ref 0–0.04)
IMM GRANULOCYTES NFR BLD AUTO: 0.3 % (ref 0–0.5)
LYMPHOCYTES # BLD AUTO: 2.8 K/UL (ref 1–4.8)
LYMPHOCYTES NFR BLD: 35.1 % (ref 18–48)
MCH RBC QN AUTO: 25.4 PG (ref 27–31)
MCHC RBC AUTO-ENTMCNC: 30.4 G/DL (ref 32–36)
MCV RBC AUTO: 84 FL (ref 82–98)
MONOCYTES # BLD AUTO: 0.8 K/UL (ref 0.3–1)
MONOCYTES NFR BLD: 9.9 % (ref 4–15)
NEUTROPHILS # BLD AUTO: 4.1 K/UL (ref 1.8–7.7)
NEUTROPHILS NFR BLD: 51 % (ref 38–73)
NRBC BLD-RTO: 0 /100 WBC
PLATELET # BLD AUTO: 314 K/UL (ref 150–450)
PMV BLD AUTO: 9.5 FL (ref 9.2–12.9)
POTASSIUM SERPL-SCNC: 3.4 MMOL/L (ref 3.5–5.1)
PROT SERPL-MCNC: 7.5 G/DL (ref 6–8.4)
RBC # BLD AUTO: 5.44 M/UL (ref 4.6–6.2)
SODIUM SERPL-SCNC: 138 MMOL/L (ref 136–145)
WBC # BLD AUTO: 7.94 K/UL (ref 3.9–12.7)

## 2024-09-05 PROCEDURE — 85025 COMPLETE CBC W/AUTO DIFF WBC: CPT | Performed by: INTERNAL MEDICINE

## 2024-09-05 PROCEDURE — 36415 COLL VENOUS BLD VENIPUNCTURE: CPT | Performed by: INTERNAL MEDICINE

## 2024-09-05 PROCEDURE — 80053 COMPREHEN METABOLIC PANEL: CPT | Performed by: INTERNAL MEDICINE

## 2024-09-10 ENCOUNTER — HOSPITAL ENCOUNTER (OUTPATIENT)
Dept: RADIOLOGY | Facility: HOSPITAL | Age: 24
Discharge: HOME | End: 2024-09-10
Attending: INTERNAL MEDICINE
Payer: MEDICAID

## 2024-09-10 VITALS — BODY MASS INDEX: 20.81 KG/M2 | WEIGHT: 145 LBS

## 2024-09-10 DIAGNOSIS — D75.1 POLYCYTHEMIA: ICD-10-CM

## 2024-09-10 DIAGNOSIS — N63.0 BREAST MASS IN MALE: ICD-10-CM

## 2024-09-13 ENCOUNTER — HOSPITAL ENCOUNTER (INPATIENT)
Facility: HOSPITAL | Age: 24
LOS: 8 days | Discharge: HOME | DRG: 440 | End: 2024-09-21
Attending: STUDENT IN AN ORGANIZED HEALTH CARE EDUCATION/TRAINING PROGRAM | Admitting: STUDENT IN AN ORGANIZED HEALTH CARE EDUCATION/TRAINING PROGRAM
Payer: MEDICAID

## 2024-09-13 DIAGNOSIS — B20 HIV INFECTION, UNSPECIFIED SYMPTOM STATUS: Primary | ICD-10-CM

## 2024-09-13 DIAGNOSIS — R11.10 VOMITING: ICD-10-CM

## 2024-09-13 DIAGNOSIS — K86.1 CHRONIC PANCREATITIS, UNSPECIFIED PANCREATITIS TYPE: ICD-10-CM

## 2024-09-13 DIAGNOSIS — K85.90 ACUTE ON CHRONIC PANCREATITIS: ICD-10-CM

## 2024-09-13 DIAGNOSIS — K86.2 PANCREATIC PSEUDOCYST/CYST: ICD-10-CM

## 2024-09-13 DIAGNOSIS — K86.3 PANCREATIC PSEUDOCYST/CYST: ICD-10-CM

## 2024-09-13 DIAGNOSIS — K86.1 ACUTE ON CHRONIC PANCREATITIS: ICD-10-CM

## 2024-09-13 DIAGNOSIS — R94.31 QT PROLONGATION: ICD-10-CM

## 2024-09-13 DIAGNOSIS — Z21 ASYMPTOMATIC HIV INFECTION, WITH NO HISTORY OF HIV-RELATED ILLNESS: ICD-10-CM

## 2024-09-13 PROBLEM — K59.00 CONSTIPATION: Status: ACTIVE | Noted: 2024-09-13

## 2024-09-13 LAB
ALBUMIN SERPL BCP-MCNC: 3.6 G/DL (ref 3.5–5.2)
ALP SERPL-CCNC: 157 U/L (ref 55–135)
ALT SERPL W/O P-5'-P-CCNC: 45 U/L (ref 10–44)
AMPHET+METHAMPHET UR QL: NEGATIVE
ANION GAP SERPL CALC-SCNC: 11 MMOL/L (ref 8–16)
AST SERPL-CCNC: 76 U/L (ref 10–40)
BARBITURATES UR QL SCN>200 NG/ML: NEGATIVE
BASOPHILS # BLD AUTO: 0.03 K/UL (ref 0–0.2)
BASOPHILS NFR BLD: 0.5 % (ref 0–1.9)
BENZODIAZ UR QL SCN>200 NG/ML: NEGATIVE
BILIRUB SERPL-MCNC: 0.8 MG/DL (ref 0.1–1)
BILIRUB UR QL STRIP: NEGATIVE
BUN SERPL-MCNC: 7 MG/DL (ref 6–20)
BZE UR QL SCN: NEGATIVE
CALCIUM SERPL-MCNC: 8.6 MG/DL (ref 8.7–10.5)
CANNABINOIDS UR QL SCN: ABNORMAL
CHLORIDE SERPL-SCNC: 99 MMOL/L (ref 95–110)
CLARITY UR REFRACT.AUTO: CLEAR
CO2 SERPL-SCNC: 25 MMOL/L (ref 23–29)
COLOR UR AUTO: YELLOW
CREAT SERPL-MCNC: 0.7 MG/DL (ref 0.5–1.4)
CREAT UR-MCNC: 56 MG/DL (ref 23–375)
DIFFERENTIAL METHOD BLD: ABNORMAL
EOSINOPHIL # BLD AUTO: 0.1 K/UL (ref 0–0.5)
EOSINOPHIL NFR BLD: 1 % (ref 0–8)
ERYTHROCYTE [DISTWIDTH] IN BLOOD BY AUTOMATED COUNT: 18.3 % (ref 11.5–14.5)
EST. GFR  (NO RACE VARIABLE): >60 ML/MIN/1.73 M^2
ETHANOL UR-MCNC: <10 MG/DL
GLUCOSE SERPL-MCNC: 95 MG/DL (ref 70–110)
GLUCOSE UR QL STRIP: NEGATIVE
HCT VFR BLD AUTO: 37.8 % (ref 40–54)
HGB BLD-MCNC: 11.5 G/DL (ref 14–18)
HGB UR QL STRIP: NEGATIVE
IMM GRANULOCYTES # BLD AUTO: 0.01 K/UL (ref 0–0.04)
IMM GRANULOCYTES NFR BLD AUTO: 0.2 % (ref 0–0.5)
KETONES UR QL STRIP: NEGATIVE
LEUKOCYTE ESTERASE UR QL STRIP: NEGATIVE
LIPASE SERPL-CCNC: 60 U/L (ref 4–60)
LYMPHOCYTES # BLD AUTO: 1.3 K/UL (ref 1–4.8)
LYMPHOCYTES NFR BLD: 22.9 % (ref 18–48)
MAGNESIUM SERPL-MCNC: 1.9 MG/DL (ref 1.6–2.6)
MCH RBC QN AUTO: 25.8 PG (ref 27–31)
MCHC RBC AUTO-ENTMCNC: 30.4 G/DL (ref 32–36)
MCV RBC AUTO: 85 FL (ref 82–98)
METHADONE UR QL SCN>300 NG/ML: NEGATIVE
MONOCYTES # BLD AUTO: 0.6 K/UL (ref 0.3–1)
MONOCYTES NFR BLD: 10.4 % (ref 4–15)
NEUTROPHILS # BLD AUTO: 3.8 K/UL (ref 1.8–7.7)
NEUTROPHILS NFR BLD: 65 % (ref 38–73)
NITRITE UR QL STRIP: NEGATIVE
NRBC BLD-RTO: 0 /100 WBC
OHS QRS DURATION: 90 MS
OHS QTC CALCULATION: 469 MS
OPIATES UR QL SCN: ABNORMAL
PCP UR QL SCN>25 NG/ML: NEGATIVE
PH UR STRIP: 7 [PH] (ref 5–8)
PLATELET # BLD AUTO: 191 K/UL (ref 150–450)
PMV BLD AUTO: 9.4 FL (ref 9.2–12.9)
POTASSIUM SERPL-SCNC: 3.4 MMOL/L (ref 3.5–5.1)
PROT SERPL-MCNC: 7.3 G/DL (ref 6–8.4)
PROT UR QL STRIP: NEGATIVE
RBC # BLD AUTO: 4.46 M/UL (ref 4.6–6.2)
SODIUM SERPL-SCNC: 135 MMOL/L (ref 136–145)
SP GR UR STRIP: 1.02 (ref 1–1.03)
TOXICOLOGY INFORMATION: ABNORMAL
URN SPEC COLLECT METH UR: NORMAL
WBC # BLD AUTO: 5.84 K/UL (ref 3.9–12.7)

## 2024-09-13 PROCEDURE — 25000003 PHARM REV CODE 250: Performed by: STUDENT IN AN ORGANIZED HEALTH CARE EDUCATION/TRAINING PROGRAM

## 2024-09-13 PROCEDURE — 96375 TX/PRO/DX INJ NEW DRUG ADDON: CPT

## 2024-09-13 PROCEDURE — 63600175 PHARM REV CODE 636 W HCPCS: Performed by: STUDENT IN AN ORGANIZED HEALTH CARE EDUCATION/TRAINING PROGRAM

## 2024-09-13 PROCEDURE — 81003 URINALYSIS AUTO W/O SCOPE: CPT | Performed by: STUDENT IN AN ORGANIZED HEALTH CARE EDUCATION/TRAINING PROGRAM

## 2024-09-13 PROCEDURE — 83690 ASSAY OF LIPASE: CPT | Performed by: STUDENT IN AN ORGANIZED HEALTH CARE EDUCATION/TRAINING PROGRAM

## 2024-09-13 PROCEDURE — G0378 HOSPITAL OBSERVATION PER HR: HCPCS

## 2024-09-13 PROCEDURE — 96374 THER/PROPH/DIAG INJ IV PUSH: CPT

## 2024-09-13 PROCEDURE — 80307 DRUG TEST PRSMV CHEM ANLYZR: CPT | Performed by: STUDENT IN AN ORGANIZED HEALTH CARE EDUCATION/TRAINING PROGRAM

## 2024-09-13 PROCEDURE — 25500020 PHARM REV CODE 255: Performed by: STUDENT IN AN ORGANIZED HEALTH CARE EDUCATION/TRAINING PROGRAM

## 2024-09-13 PROCEDURE — 93005 ELECTROCARDIOGRAM TRACING: CPT

## 2024-09-13 PROCEDURE — 96376 TX/PRO/DX INJ SAME DRUG ADON: CPT

## 2024-09-13 PROCEDURE — 87591 N.GONORRHOEAE DNA AMP PROB: CPT | Performed by: STUDENT IN AN ORGANIZED HEALTH CARE EDUCATION/TRAINING PROGRAM

## 2024-09-13 PROCEDURE — 96361 HYDRATE IV INFUSION ADD-ON: CPT

## 2024-09-13 PROCEDURE — 93010 ELECTROCARDIOGRAM REPORT: CPT | Mod: ,,, | Performed by: INTERNAL MEDICINE

## 2024-09-13 PROCEDURE — 87491 CHLMYD TRACH DNA AMP PROBE: CPT | Performed by: STUDENT IN AN ORGANIZED HEALTH CARE EDUCATION/TRAINING PROGRAM

## 2024-09-13 PROCEDURE — 11000001 HC ACUTE MED/SURG PRIVATE ROOM

## 2024-09-13 PROCEDURE — 83735 ASSAY OF MAGNESIUM: CPT | Performed by: STUDENT IN AN ORGANIZED HEALTH CARE EDUCATION/TRAINING PROGRAM

## 2024-09-13 PROCEDURE — 85025 COMPLETE CBC W/AUTO DIFF WBC: CPT | Performed by: STUDENT IN AN ORGANIZED HEALTH CARE EDUCATION/TRAINING PROGRAM

## 2024-09-13 PROCEDURE — 99285 EMERGENCY DEPT VISIT HI MDM: CPT | Mod: 25

## 2024-09-13 PROCEDURE — 80053 COMPREHEN METABOLIC PANEL: CPT | Performed by: STUDENT IN AN ORGANIZED HEALTH CARE EDUCATION/TRAINING PROGRAM

## 2024-09-13 RX ORDER — METOCLOPRAMIDE HYDROCHLORIDE 5 MG/ML
10 INJECTION INTRAMUSCULAR; INTRAVENOUS
Status: COMPLETED | OUTPATIENT
Start: 2024-09-13 | End: 2024-09-13

## 2024-09-13 RX ORDER — MORPHINE SULFATE 4 MG/ML
4 INJECTION, SOLUTION INTRAMUSCULAR; INTRAVENOUS
Status: COMPLETED | OUTPATIENT
Start: 2024-09-13 | End: 2024-09-13

## 2024-09-13 RX ORDER — POTASSIUM CHLORIDE 20 MEQ/1
40 TABLET, EXTENDED RELEASE ORAL ONCE
Status: COMPLETED | OUTPATIENT
Start: 2024-09-13 | End: 2024-09-13

## 2024-09-13 RX ORDER — PROCHLORPERAZINE EDISYLATE 5 MG/ML
5 INJECTION INTRAMUSCULAR; INTRAVENOUS EVERY 6 HOURS PRN
Status: DISCONTINUED | OUTPATIENT
Start: 2024-09-13 | End: 2024-09-21 | Stop reason: HOSPADM

## 2024-09-13 RX ORDER — ONDANSETRON HYDROCHLORIDE 2 MG/ML
4 INJECTION, SOLUTION INTRAVENOUS EVERY 8 HOURS PRN
Status: DISCONTINUED | OUTPATIENT
Start: 2024-09-13 | End: 2024-09-13

## 2024-09-13 RX ORDER — HYDROMORPHONE HYDROCHLORIDE 1 MG/ML
2 INJECTION, SOLUTION INTRAMUSCULAR; INTRAVENOUS; SUBCUTANEOUS EVERY 4 HOURS PRN
Status: DISCONTINUED | OUTPATIENT
Start: 2024-09-13 | End: 2024-09-14

## 2024-09-13 RX ORDER — POTASSIUM CHLORIDE 7.45 MG/ML
10 INJECTION INTRAVENOUS
Status: DISCONTINUED | OUTPATIENT
Start: 2024-09-13 | End: 2024-09-13

## 2024-09-13 RX ORDER — OXYCODONE HYDROCHLORIDE 10 MG/1
10 TABLET ORAL EVERY 6 HOURS PRN
Status: DISCONTINUED | OUTPATIENT
Start: 2024-09-13 | End: 2024-09-14

## 2024-09-13 RX ORDER — LACTULOSE 10 G/15ML
20 SOLUTION ORAL 3 TIMES DAILY
Status: DISCONTINUED | OUTPATIENT
Start: 2024-09-13 | End: 2024-09-14

## 2024-09-13 RX ORDER — SODIUM CHLORIDE, SODIUM LACTATE, POTASSIUM CHLORIDE, CALCIUM CHLORIDE 600; 310; 30; 20 MG/100ML; MG/100ML; MG/100ML; MG/100ML
INJECTION, SOLUTION INTRAVENOUS CONTINUOUS
Status: DISCONTINUED | OUTPATIENT
Start: 2024-09-13 | End: 2024-09-15

## 2024-09-13 RX ORDER — SODIUM CHLORIDE 0.9 % (FLUSH) 0.9 %
10 SYRINGE (ML) INJECTION
Status: DISCONTINUED | OUTPATIENT
Start: 2024-09-13 | End: 2024-09-21 | Stop reason: HOSPADM

## 2024-09-13 RX ORDER — MORPHINE SULFATE 4 MG/ML
4 INJECTION, SOLUTION INTRAMUSCULAR; INTRAVENOUS EVERY 4 HOURS PRN
Status: DISCONTINUED | OUTPATIENT
Start: 2024-09-13 | End: 2024-09-13

## 2024-09-13 RX ORDER — OXYCODONE HYDROCHLORIDE 10 MG/1
10 TABLET ORAL EVERY 4 HOURS PRN
Status: DISCONTINUED | OUTPATIENT
Start: 2024-09-13 | End: 2024-09-13

## 2024-09-13 RX ORDER — NALOXONE HCL 0.4 MG/ML
0.4 VIAL (ML) INJECTION
Status: DISCONTINUED | OUTPATIENT
Start: 2024-09-13 | End: 2024-09-21 | Stop reason: HOSPADM

## 2024-09-13 RX ORDER — POTASSIUM CHLORIDE 7.45 MG/ML
10 INJECTION INTRAVENOUS
Status: DISPENSED | OUTPATIENT
Start: 2024-09-13 | End: 2024-09-13

## 2024-09-13 RX ORDER — ONDANSETRON HYDROCHLORIDE 2 MG/ML
4 INJECTION, SOLUTION INTRAVENOUS EVERY 6 HOURS PRN
Status: DISCONTINUED | OUTPATIENT
Start: 2024-09-13 | End: 2024-09-21 | Stop reason: HOSPADM

## 2024-09-13 RX ORDER — HYDROMORPHONE HYDROCHLORIDE 1 MG/ML
1 INJECTION, SOLUTION INTRAMUSCULAR; INTRAVENOUS; SUBCUTANEOUS
Status: COMPLETED | OUTPATIENT
Start: 2024-09-13 | End: 2024-09-13

## 2024-09-13 RX ORDER — POTASSIUM CHLORIDE 20 MEQ/1
40 TABLET, EXTENDED RELEASE ORAL ONCE
Status: DISCONTINUED | OUTPATIENT
Start: 2024-09-13 | End: 2024-09-13

## 2024-09-13 RX ORDER — DICYCLOMINE HYDROCHLORIDE 10 MG/1
20 CAPSULE ORAL
Status: COMPLETED | OUTPATIENT
Start: 2024-09-13 | End: 2024-09-13

## 2024-09-13 RX ORDER — HEPARIN SODIUM 5000 [USP'U]/ML
5000 INJECTION, SOLUTION INTRAVENOUS; SUBCUTANEOUS EVERY 8 HOURS
Status: DISCONTINUED | OUTPATIENT
Start: 2024-09-13 | End: 2024-09-21 | Stop reason: HOSPADM

## 2024-09-13 RX ORDER — OXYCODONE HYDROCHLORIDE 5 MG/1
5 TABLET ORAL EVERY 6 HOURS PRN
Status: DISCONTINUED | OUTPATIENT
Start: 2024-09-13 | End: 2024-09-13

## 2024-09-13 RX ORDER — ONDANSETRON HYDROCHLORIDE 2 MG/ML
4 INJECTION, SOLUTION INTRAVENOUS
Status: COMPLETED | OUTPATIENT
Start: 2024-09-13 | End: 2024-09-13

## 2024-09-13 RX ADMIN — MORPHINE SULFATE 4 MG: 4 INJECTION INTRAVENOUS at 12:09

## 2024-09-13 RX ADMIN — SODIUM CHLORIDE, POTASSIUM CHLORIDE, SODIUM LACTATE AND CALCIUM CHLORIDE 75 ML/HR: 600; 310; 30; 20 INJECTION, SOLUTION INTRAVENOUS at 05:09

## 2024-09-13 RX ADMIN — DICYCLOMINE HYDROCHLORIDE 20 MG: 10 CAPSULE ORAL at 03:09

## 2024-09-13 RX ADMIN — LACTULOSE 20 G: 20 SOLUTION ORAL at 08:09

## 2024-09-13 RX ADMIN — SODIUM CHLORIDE, POTASSIUM CHLORIDE, SODIUM LACTATE AND CALCIUM CHLORIDE 1000 ML: 600; 310; 30; 20 INJECTION, SOLUTION INTRAVENOUS at 12:09

## 2024-09-13 RX ADMIN — MORPHINE SULFATE 4 MG: 4 INJECTION INTRAVENOUS at 02:09

## 2024-09-13 RX ADMIN — HYDROMORPHONE HYDROCHLORIDE 2 MG: 1 INJECTION, SOLUTION INTRAMUSCULAR; INTRAVENOUS; SUBCUTANEOUS at 06:09

## 2024-09-13 RX ADMIN — HYDROMORPHONE HYDROCHLORIDE 2 MG: 1 INJECTION, SOLUTION INTRAMUSCULAR; INTRAVENOUS; SUBCUTANEOUS at 10:09

## 2024-09-13 RX ADMIN — METOCLOPRAMIDE 10 MG: 5 INJECTION, SOLUTION INTRAMUSCULAR; INTRAVENOUS at 03:09

## 2024-09-13 RX ADMIN — POTASSIUM CHLORIDE 40 MEQ: 1500 TABLET, EXTENDED RELEASE ORAL at 06:09

## 2024-09-13 RX ADMIN — IOHEXOL 75 ML: 350 INJECTION, SOLUTION INTRAVENOUS at 02:09

## 2024-09-13 RX ADMIN — HYDROMORPHONE HYDROCHLORIDE 1 MG: 1 INJECTION, SOLUTION INTRAMUSCULAR; INTRAVENOUS; SUBCUTANEOUS at 03:09

## 2024-09-13 RX ADMIN — ONDANSETRON 4 MG: 2 INJECTION INTRAMUSCULAR; INTRAVENOUS at 12:09

## 2024-09-13 NOTE — HPI
Tasia Cardona is a 23yo M w/ HIV on Biktarvy, hx polycythemia vera c/b splenic vein thrombosis previously on Eliquis, asthma, hx Corinne Jeronimo tear (9/2023), hx of necrotizing pancreatitis attributed to alcohol use c/b chronic pancreatic fluid collections and several acute on chronic pancreatitis episodes who presents to the emergency room with nausea, vomiting, and abdominal pain.  Notes feeling in his usual state of health yesterday.  However, this morning around 4:00 a.m. he was awoken by a sharp, stabbing abdominal pain.  He notes the abdominal pain is mostly in his left upper quadrant; radiated a little bit to the center and lower left quadrant.  It was so severe it caused him to have nausea and vomit.  He was concerned about his emesis as it was brown appearing. He reports no hematemesis, melena, BRBPR.  The abdominal pain is pretty much constant. He denies any fever or chills preceding the abdominal pain, however had some chills after a bout of vomiting.  He was unable to tolerate a bagel this morning.  He has been tolerating small sips of water in the emergency department since arriving this morning around 11.  In terms of diarrhea he reported to the ED, he notes that he had several very small watery bowel movements yesterday; his last good bowel movement was about a week ago.    Of note, patient was recently admitted to [facility] 7/23-7/29 with similar symptoms. 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  He was evaluated by IR; his collection was deemed too deep and surrounded by too many varices to afford good percutaneous window for drainage.  AES was then consulted; fluid collection was deemed too complex to be drained.  A CTA however was ordered to evaluate  for any component of hemorrhagic pancreatitis; CTA was without signs of hemorrhagic pancreatitis.    Today, on admission, potassium mildly low at 3.4, lipase within normal limits, CT abdomen pelvis with IV contrast notable for stable fluid collection along the superior aspect of pancreas suggesting pseudocyst without surrounding inflammation to suggest interval infection; no convincing peripancreatic inflammation.  Early enteritis or developing right colonic constipation was also noted.

## 2024-09-13 NOTE — H&P
"[facility] - Emergency Dept  [facility]  History & Physical    Patient Name: Tasia Cardona  MRN: [id number]  Patient Class: OP- Observation  Admission Date: 9/13/2024  Attending Physician: Stephanie Xavier MD   Primary Care Provider: Pina Jones NP         Patient information was obtained from patient, past medical records, and ER records.     Subjective:     Principal Problem:Acute on chronic pancreatitis    Chief Complaint:   Chief Complaint   Patient presents with    Abdominal Pain     NVD and abdominal pain. Hx of pancreatitis and "feels like a flare up"        HPI: Tasia Cardona is a 23yo M w/ HIV on Biktarvy, hx polycythemia vera c/b splenic vein thrombosis previously on Eliquis, asthma, hx Corinne Jeronimo tear (9/2023), hx of necrotizing pancreatitis attributed to alcohol use c/b chronic pancreatic fluid collections and several acute on chronic pancreatitis episodes who presents to the emergency room with nausea, vomiting, and abdominal pain.  Notes feeling in his usual state of health yesterday.  However, this morning around 4:00 a.m. he was awoken by a sharp, stabbing abdominal pain.  He notes the abdominal pain is mostly in his left upper quadrant; radiated a little bit to the center and lower left quadrant.  It was so severe it caused him to have nausea and vomit.  He was concerned about his emesis as it was brown appearing. He reports no hematemesis, melena, BRBPR.  The abdominal pain is pretty much constant. He denies any fever or chills preceding the abdominal pain, however had some chills after a bout of vomiting.  He was unable to tolerate a bagel this morning.  He has been tolerating small sips of water in the emergency department since arriving this morning around 11.  In terms of diarrhea he reported to the ED, he notes that he had several very small watery bowel movements yesterday; his last good bowel movement was about a week ago.    Of note, patient was recently admitted to " [facility] 7/23-7/29 with similar symptoms. 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  He was evaluated by IR; his collection was deemed too deep and surrounded by too many varices to afford good percutaneous window for drainage.  AES was then consulted; fluid collection was deemed too complex to be drained.  A CTA however was ordered to evaluate for any component of hemorrhagic pancreatitis; CTA was without signs of hemorrhagic pancreatitis.    Today, on admission, potassium mildly low at 3.4, lipase within normal limits, CT abdomen pelvis with IV contrast notable for stable fluid collection along the superior aspect of pancreas suggesting pseudocyst without surrounding inflammation to suggest interval infection; no convincing peripancreatic inflammation.  Early enteritis or developing right colonic constipation was also noted.    Past Medical History:   Diagnosis Date    Acute necrotizing pancreatitis 09/20/2023    Alcohol use disorder 10/05/2023    Asthma     Hepatitis C antibody positive in blood 09/18/2023 9/2023 PCR negative    HIV infection 10/2021    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       Past Surgical History:   Procedure Laterality Date    ENDOSCOPIC ULTRASOUND OF UPPER GASTROINTESTINAL TRACT N/A 10/23/2023    Procedure: ULTRASOUND, UPPER GI TRACT, ENDOSCOPIC;  Surgeon: Emil Villatoro MD;  [facility] ([address] [place] [address]);  Service: Endoscopy;  Laterality: N/A;    ENDOSCOPIC ULTRASOUND OF UPPER GASTROINTESTINAL TRACT N/A 7/24/2024    Procedure:  ULTRASOUND, UPPER GI TRACT, ENDOSCOPIC;  Surgeon: Faustino Trujillo MD;  Location: [facility] ENDO (2ND FLR);  Service: Endoscopy;  Laterality: N/A;    ERCP N/A 10/23/2023    Procedure: ERCP (ENDOSCOPIC RETROGRADE CHOLANGIOPANCREATOGRAPHY);  Surgeon: Emil Villatoro MD;  Location: [facility] ENDO (2ND FLR);  Service: Endoscopy;  Laterality: N/A;    ESOPHAGOGASTRODUODENOSCOPY N/A 9/18/2023    Procedure: EGD (ESOPHAGOGASTRODUODENOSCOPY);  Surgeon: Kg Cleaning MD;  Location: [facility] END[facility] (2ND FLR);  Service: Endoscopy;  Laterality: N/A;    ESOPHAGOGASTRODUODENOSCOPY N/A 3/8/2024    Procedure: EGD (ESOPHAGOGASTRODUODENOSCOPY);  Surgeon: Greg Love MD;  Location: [facility] END[facility] (2ND FLR);  Service: Endoscopy;  Laterality: N/A;    ESOPHAGOGASTRODUODENOSCOPY N/A 7/24/2024    Procedure: EGD (ESOPHAGOGASTRODUODENOSCOPY);  Surgeon: Faustino Trujillo MD;  Location: [facility] END[facility] (2ND FLR);  Service: Endoscopy;  Laterality: N/A;       Review of patient's allergies indicates:   Allergen Reactions    [place]by Swelling    Pecan nut Swelling    Penicillins Hives     Tolerates cephalosporins    Tolerated piperacillin/tazobactam 11/2023    Sulfa (sulfonamide antibiotics) Hives       No current facility-administered medications on file prior to encounter.     Current Outpatient Medications on File Prior to Encounter   Medication Sig    kkevnafop-ivekwkxi-lfvycee ala (BIKTARVY) -25 mg (25 kg or greater) Take 1 tablet by mouth once daily.    pantoprazole (PROTONIX) 40 MG tablet Take 1 tablet (40 mg total) by mouth once daily.    promethazine (PHENERGAN) 25 MG tablet Take 1 tablet (25 mg total) by mouth every 4 (four) hours.     Family History       Problem Relation (Age of Onset)    Breast cancer Maternal Grandmother    Cancer Mother, Brother    No Known Problems Father    Ovarian cancer Mother    Pancreatitis Mother          Tobacco Use    Smoking status: Former     Types: Cigarettes    Smokeless tobacco: Never   Substance and Sexual  Activity    Alcohol use: Not Currently    Drug use: Yes     Types: Marijuana    Sexual activity: Not on file     Review of Systems   Constitutional:  Negative for fever.   HENT:  Negative for congestion, sore throat and trouble swallowing.    Eyes:  Negative for visual disturbance.   Respiratory:  Negative for cough, chest tightness and shortness of breath.    Cardiovascular:  Negative for chest pain, palpitations and leg swelling.   Gastrointestinal:  Positive for abdominal pain, constipation, nausea and vomiting.   Genitourinary:  Negative for dysuria.   Musculoskeletal:  Negative for arthralgias and myalgias.   Skin:  Negative for rash.   Neurological:  Negative for dizziness and light-headedness.   Psychiatric/Behavioral:  Negative for confusion and decreased concentration.      Objective:     Vital Signs (Most Recent):  Temp: 98.1 °F (36.7 °C) (09/13/24 1045)  Pulse: 88 (09/13/24 1530)  Resp: 16 (09/13/24 1530)  BP: (!) 136/95 (09/13/24 1530)  SpO2: 95 % (09/13/24 1530) Vital Signs (24h Range):  Temp:  [98.1 °F (36.7 °C)] 98.1 °F (36.7 °C)  Pulse:  [] 88  Resp:  [16-18] 16  SpO2:  [95 %-99 %] 95 %  BP: (122-136)/(76-95) 136/95     Weight: 65.8 kg (145 lb)  Body mass index is 20.81 kg/m².     Physical Exam  Vitals and nursing note reviewed.   Constitutional:       General: He is not in acute distress.     Appearance: He is not ill-appearing, toxic-appearing or diaphoretic.   HENT:      Head: Normocephalic and atraumatic.      Nose: Nose normal.      Mouth/Throat:      Mouth: Mucous membranes are dry.      Pharynx: Oropharynx is clear.   Eyes:      General: No scleral icterus.  Cardiovascular:      Rate and Rhythm: Normal rate and regular rhythm.      Pulses: Normal pulses.      Heart sounds: Normal heart sounds.   Pulmonary:      Effort: Pulmonary effort is normal.      Breath sounds: Normal breath sounds.   Abdominal:      General: Bowel sounds are increased. There is distension.      Tenderness: There is  abdominal tenderness in the epigastric area, left upper quadrant and left lower quadrant. There is no guarding or rebound.   Musculoskeletal:      Cervical back: Normal range of motion and neck supple.      Right lower leg: No edema.      Left lower leg: No edema.   Skin:     General: Skin is warm and dry.      Capillary Refill: Capillary refill takes less than 2 seconds.   Neurological:      Mental Status: He is alert and oriented to person, place, and time.   Psychiatric:         Behavior: Behavior normal.                Significant Labs: All pertinent labs within the past 24 hours have been reviewed.    Recent Results (from the past 24 hour(s))   EKG 12-lead    Collection Time: 09/13/24 12:16 PM   Result Value Ref Range    QRS Duration 90 ms    [facility] QTC Calculation 469 ms   CBC auto differential    Collection Time: 09/13/24 12:38 PM   Result Value Ref Range    WBC 5.84 3.90 - 12.70 K/uL    RBC 4.46 (L) 4.60 - 6.20 M/uL    Hemoglobin 11.5 (L) 14.0 - 18.0 g/dL    Hematocrit 37.8 (L) 40.0 - 54.0 %    MCV 85 82 - 98 fL    MCH 25.8 (L) 27.0 - 31.0 pg    MCHC 30.4 (L) 32.0 - 36.0 g/dL    RDW 18.3 (H) 11.5 - 14.5 %    Platelets 191 150 - 450 K/uL    MPV 9.4 9.2 - 12.9 fL    Immature Granulocytes 0.2 0.0 - 0.5 %    Gran # (ANC) 3.8 1.8 - 7.7 K/uL    Immature Grans (Abs) 0.01 0.00 - 0.04 K/uL    Lymph # 1.3 1.0 - 4.8 K/uL    Mono # 0.6 0.3 - 1.0 K/uL    Eos # 0.1 0.0 - 0.5 K/uL    Baso # 0.03 0.00 - 0.20 K/uL    nRBC 0 0 /100 WBC    Gran % 65.0 38.0 - 73.0 %    Lymph % 22.9 18.0 - 48.0 %    Mono % 10.4 4.0 - 15.0 %    Eosinophil % 1.0 0.0 - 8.0 %    Basophil % 0.5 0.0 - 1.9 %    Differential Method Automated    Comprehensive metabolic panel    Collection Time: 09/13/24 12:38 PM   Result Value Ref Range    Sodium 135 (L) 136 - 145 mmol/L    Potassium 3.4 (L) 3.5 - 5.1 mmol/L    Chloride 99 95 - 110 mmol/L    CO2 25 23 - 29 mmol/L    Glucose 95 70 - 110 mg/dL    BUN 7 6 - 20 mg/dL    Creatinine 0.7 0.5 - 1.4 mg/dL    Calcium  8.6 (L) 8.7 - 10.5 mg/dL    Total Protein 7.3 6.0 - 8.4 g/dL    Albumin 3.6 3.5 - 5.2 g/dL    Total Bilirubin 0.8 0.1 - 1.0 mg/dL    Alkaline Phosphatase 157 (H) 55 - 135 U/L    AST 76 (H) 10 - 40 U/L    ALT 45 (H) 10 - 44 U/L    eGFR >60.0 >60 mL/min/1.73 m^2    Anion Gap 11 8 - 16 mmol/L   Lipase    Collection Time: 09/13/24 12:38 PM   Result Value Ref Range    Lipase 60 4 - 60 U/L   Magnesium    Collection Time: 09/13/24 12:38 PM   Result Value Ref Range    Magnesium 1.9 1.6 - 2.6 mg/dL         Significant Imaging: I have reviewed all pertinent imaging results/findings within the past 24 hours.    .  Imaging Results              CT Abdomen Pelvis With IV Contrast NO Oral Contrast (Final result)  Result time 09/13/24 14:32:21      Final result by Juan Sxeton MD (09/13/24 14:32:21)                   Impression:      1. Low attenuating fluid collection along the superior aspect of the pancreas suggests pseudo cyst, similar in appearance to the previous exam.  No surrounding inflammation to suggest interval infection.  No convincing peripancreatic inflammation.  Correlation with laboratory values is advised as CT findings of acute pancreatitis lag behind laboratory abnormality.  2. Findings suggest hepatic steatosis noting hepatomegaly, correlation with LFTs recommended.  3. There are a few scattered fluid-filled small bowel loops, nonspecific.  This may be on the basis of developing right colonic constipation however early enteritis remains a consideration.  Correlation is advised.  4. Please see above for several additional findings.      Electronically signed by: Juan Sexton MD  Date:    09/13/2024  Time:    14:32               Narrative:    EXAMINATION:  CT ABDOMEN PELVIS WITH IV CONTRAST    CLINICAL HISTORY:  Pancreatitis, acute, severe;    TECHNIQUE:  Low dose axial images, sagittal and coronal reformations were obtained from the lung bases to the pubic symphysis following the IV administration of  75 mL of Omnipaque 350 .  Oral contrast was not given.    COMPARISON:  07/24/2024    FINDINGS:  Images of the lower thorax are unremarkable.    The liver is diffusely hypoattenuating suggesting steatosis, correlation with LFTs recommended.  The liver is enlarged.  The spleen, adrenal glands and gallbladder are unremarkable.  The stomach is relatively decompressed, no gastric wall thickening.  The pancreas enhances homogeneously without pancreatic ductal dilation.  There is a lobular fluid collection superior to the pancreas, similar to the previous examination.  On coronal imaging this measures 6.1 x 3.6 cm suggesting pseudo cyst.  No interval wall thickening or inflammation about the collection to suggest infection.  The portal vein, SMV, celiac axis and SMA all are patent.  There is some irregularity involving the splenic vein noting a few scattered perigastric collaterals.  No significant abdominal lymphadenopathy.    The kidneys enhance symmetrically without hydronephrosis or nephrolithiasis.  The bilateral ureters are unremarkable without calculi seen.  The urinary bladder is nondistended noting there may be some residual wall thickening.  The prostate is not enlarged.    The large bowel is grossly unremarkable noting moderate stool in the right colon.  The terminal ileum and appendix are unremarkable.  There are a few scattered fluid-filled small bowel loops.  There are a few scattered shotty periaortic, pericaval, and mesenteric lymph nodes.  No focal organized pelvic fluid collection.    No acute osseous abnormalities.  No significant inguinal lymphadenopathy.                                      Assessment/Plan:     * Acute on chronic pancreatitis  Nausea and vomiting  Abdominal pain  Fluid collection of pancreas    Lipase 60  Lipid panel added; previously without hypertriglyceridemia  Unclear etiology; notably, with PV and history of thrombus, always a consideration however CTA last admission  unremarkable  CT scan with stable fluid collection of pancreas; no convincing peripancreatic inflammation or surrounding inflammation to suggest interval infection  Continue aggressive IVF  Pain control - IV until able to tolerate p.o.  NPO for now with early feeding if tolerated    Constipation  Concern for overflow diarrhea  Address with bowel regimen; may be contributing to abdominal pain, nausea, vomiting      Hypokalemia  Patient's most recent potassium results are listed below.   Recent Labs     09/13/24  1238   K 3.4*     Plan  - Replete potassium per protocol  - Monitor potassium Daily  - Patient's hypokalemia is  pending      VTE Risk Mitigation (From admission, onward)           Ordered     heparin (porcine) injection 5,000 Units  Every 8 hours         09/13/24 1654     IP VTE HIGH RISK PATIENT  Once         09/13/24 1654     Place sequential compression device  Until discontinued         09/13/24 1654                       On 09/13/2024, patient should be placed in hospital observation services under my care.             Stephanie Xavier MD  Department of Hospital Medicine  [facility] - Emergency Dept

## 2024-09-13 NOTE — ASSESSMENT & PLAN NOTE
Nausea and vomiting  Abdominal pain  Fluid collection of pancreas    Lipase 60  Lipid panel added; previously without hypertriglyceridemia  Unclear etiology; notably, with PV and history of thrombus, always a consideration however CTA last admission unremarkable  CT scan with stable fluid collection of pancreas; no convincing peripancreatic inflammation or surrounding inflammation to suggest interval infection  Continue aggressive IVF  Pain control - IV until able to tolerate p.o.  NPO for now with early feeding if tolerated

## 2024-09-13 NOTE — SUBJECTIVE & OBJECTIVE
Past Medical History:   Diagnosis Date    Acute necrotizing pancreatitis 09/20/2023    Alcohol use disorder 10/05/2023    Asthma     Hepatitis C antibody positive in blood 09/18/2023 9/2023 PCR negative    HIV infection 10/2021    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       Past Surgical History:   Procedure Laterality Date    ENDOSCOPIC ULTRASOUND OF UPPER GASTROINTESTINAL TRACT N/A 10/23/2023    Procedure: ULTRASOUND, UPPER GI TRACT, ENDOSCOPIC;  Surgeon: Emil Villatoro MD;  Location: [facility] ([address] [place] [address]);  Service: Endoscopy;  Laterality: N/A;    ENDOSCOPIC ULTRASOUND OF UPPER GASTROINTESTINAL TRACT N/A 7/24/2024    Procedure: ULTRASOUND, UPPER GI TRACT, ENDOSCOPIC;  Surgeon: Faustino Trujillo MD;  Location: [facility] ([address] [place] [address]);  Service: Endoscopy;  Laterality: N/A;    ERCP N/A 10/23/2023    Procedure: ERCP (ENDOSCOPIC RETROGRADE CHOLANGIOPANCREATOGRAPHY);  Surgeon: Emil Villatoro MD;  [facility] ([facility]  Service: Endoscopy;  Laterality: N/A;    ESOPHAGOGASTRODUODENOSCOPY N/A 9/18/2023    Procedure: EGD (ESOPHAGOGASTRODUODENOSCOPY);  Surgeon: Kg Cleaning MD;  [facility]  Service: Endoscopy;  Laterality: N/A;    ESOPHAGOGASTRODUODENOSCOPY N/A 3/8/2024    Procedure: EGD (ESOPHAGOGASTRODUODENOSCOPY);  Surgeon: Greg Love MD;  [facility] ([facility]  Service: Endoscopy;  Laterality: N/A;    ESOPHAGOGASTRODUODENOSCOPY N/A 7/24/2024    Procedure: EGD (ESOPHAGOGASTRODUODENOSCOPY);  Surgeon: Faustino Trujillo MD;  [facility]  Service: Endoscopy;  Laterality: N/A;       Review of patient's allergies indicates:   Allergen Reactions    [place] Swelling    Pecan nut Swelling    Penicillins Hives     Tolerates cephalosporins    Tolerated  piperacillin/tazobactam 11/2023    Sulfa (sulfonamide antibiotics) Hives       No current facility-administered medications on file prior to encounter.     Current Outpatient Medications on File Prior to Encounter   Medication Sig    bfectqmge-brqsidzf-ywsiljw ala (BIKTARVY) -25 mg (25 kg or greater) Take 1 tablet by mouth once daily.    pantoprazole (PROTONIX) 40 MG tablet Take 1 tablet (40 mg total) by mouth once daily.    promethazine (PHENERGAN) 25 MG tablet Take 1 tablet (25 mg total) by mouth every 4 (four) hours.     Family History       Problem Relation (Age of Onset)    Breast cancer Maternal Grandmother    Cancer Mother, Brother    No Known Problems Father    Ovarian cancer Mother    Pancreatitis Mother          Tobacco Use    Smoking status: Former     Types: Cigarettes    Smokeless tobacco: Never   Substance and Sexual Activity    Alcohol use: Not Currently    Drug use: Yes     Types: Marijuana    Sexual activity: Not on file     Review of Systems   Constitutional:  Negative for fever.   HENT:  Negative for congestion, sore throat and trouble swallowing.    Eyes:  Negative for visual disturbance.   Respiratory:  Negative for cough, chest tightness and shortness of breath.    Cardiovascular:  Negative for chest pain, palpitations and leg swelling.   Gastrointestinal:  Positive for abdominal pain, constipation, nausea and vomiting.   Genitourinary:  Negative for dysuria.   Musculoskeletal:  Negative for arthralgias and myalgias.   Skin:  Negative for rash.   Neurological:  Negative for dizziness and light-headedness.   Psychiatric/Behavioral:  Negative for confusion and decreased concentration.      Objective:     Vital Signs (Most Recent):  Temp: 98.1 °F (36.7 °C) (09/13/24 1045)  Pulse: 88 (09/13/24 1530)  Resp: 16 (09/13/24 1530)  BP: (!) 136/95 (09/13/24 1530)  SpO2: 95 % (09/13/24 1530) Vital Signs (24h Range):  Temp:  [98.1 °F (36.7 °C)] 98.1 °F (36.7 °C)  Pulse:  [] 88  Resp:  [16-18]  16  SpO2:  [95 %-99 %] 95 %  BP: (122-136)/(76-95) 136/95     Weight: 65.8 kg (145 lb)  Body mass index is 20.81 kg/m².     Physical Exam  Vitals and nursing note reviewed.   Constitutional:       General: He is not in acute distress.     Appearance: He is not ill-appearing, toxic-appearing or diaphoretic.   HENT:      Head: Normocephalic and atraumatic.      Nose: Nose normal.      Mouth/Throat:      Mouth: Mucous membranes are dry.      Pharynx: Oropharynx is clear.   Eyes:      General: No scleral icterus.  Cardiovascular:      Rate and Rhythm: Normal rate and regular rhythm.      Pulses: Normal pulses.      Heart sounds: Normal heart sounds.   Pulmonary:      Effort: Pulmonary effort is normal.      Breath sounds: Normal breath sounds.   Abdominal:      General: Bowel sounds are increased. There is distension.      Tenderness: There is abdominal tenderness in the epigastric area, left upper quadrant and left lower quadrant. There is no guarding or rebound.   Musculoskeletal:      Cervical back: Normal range of motion and neck supple.      Right lower leg: No edema.      Left lower leg: No edema.   Skin:     General: Skin is warm and dry.      Capillary Refill: Capillary refill takes less than 2 seconds.   Neurological:      Mental Status: He is alert and oriented to person, place, and time.   Psychiatric:         Behavior: Behavior normal.                Significant Labs: All pertinent labs within the past 24 hours have been reviewed.    Recent Results (from the past 24 hour(s))   EKG 12-lead    Collection Time: 09/13/24 12:16 PM   Result Value Ref Range    QRS Duration 90 ms    [facility] QTC Calculation 469 ms   CBC auto differential    Collection Time: 09/13/24 12:38 PM   Result Value Ref Range    WBC 5.84 3.90 - 12.70 K/uL    RBC 4.46 (L) 4.60 - 6.20 M/uL    Hemoglobin 11.5 (L) 14.0 - 18.0 g/dL    Hematocrit 37.8 (L) 40.0 - 54.0 %    MCV 85 82 - 98 fL    MCH 25.8 (L) 27.0 - 31.0 pg    MCHC 30.4 (L) 32.0 - 36.0 g/dL     RDW 18.3 (H) 11.5 - 14.5 %    Platelets 191 150 - 450 K/uL    MPV 9.4 9.2 - 12.9 fL    Immature Granulocytes 0.2 0.0 - 0.5 %    Gran # (ANC) 3.8 1.8 - 7.7 K/uL    Immature Grans (Abs) 0.01 0.00 - 0.04 K/uL    Lymph # 1.3 1.0 - 4.8 K/uL    Mono # 0.6 0.3 - 1.0 K/uL    Eos # 0.1 0.0 - 0.5 K/uL    Baso # 0.03 0.00 - 0.20 K/uL    nRBC 0 0 /100 WBC    Gran % 65.0 38.0 - 73.0 %    Lymph % 22.9 18.0 - 48.0 %    Mono % 10.4 4.0 - 15.0 %    Eosinophil % 1.0 0.0 - 8.0 %    Basophil % 0.5 0.0 - 1.9 %    Differential Method Automated    Comprehensive metabolic panel    Collection Time: 09/13/24 12:38 PM   Result Value Ref Range    Sodium 135 (L) 136 - 145 mmol/L    Potassium 3.4 (L) 3.5 - 5.1 mmol/L    Chloride 99 95 - 110 mmol/L    CO2 25 23 - 29 mmol/L    Glucose 95 70 - 110 mg/dL    BUN 7 6 - 20 mg/dL    Creatinine 0.7 0.5 - 1.4 mg/dL    Calcium 8.6 (L) 8.7 - 10.5 mg/dL    Total Protein 7.3 6.0 - 8.4 g/dL    Albumin 3.6 3.5 - 5.2 g/dL    Total Bilirubin 0.8 0.1 - 1.0 mg/dL    Alkaline Phosphatase 157 (H) 55 - 135 U/L    AST 76 (H) 10 - 40 U/L    ALT 45 (H) 10 - 44 U/L    eGFR >60.0 >60 mL/min/1.73 m^2    Anion Gap 11 8 - 16 mmol/L   Lipase    Collection Time: 09/13/24 12:38 PM   Result Value Ref Range    Lipase 60 4 - 60 U/L   Magnesium    Collection Time: 09/13/24 12:38 PM   Result Value Ref Range    Magnesium 1.9 1.6 - 2.6 mg/dL         Significant Imaging: I have reviewed all pertinent imaging results/findings within the past 24 hours.    .  Imaging Results              CT Abdomen Pelvis With IV Contrast NO Oral Contrast (Final result)  Result time 09/13/24 14:32:21      Final result by Juan Sexton MD (09/13/24 14:32:21)                   Impression:      1. Low attenuating fluid collection along the superior aspect of the pancreas suggests pseudo cyst, similar in appearance to the previous exam.  No surrounding inflammation to suggest interval infection.  No convincing peripancreatic inflammation.  Correlation  with laboratory values is advised as CT findings of acute pancreatitis lag behind laboratory abnormality.  2. Findings suggest hepatic steatosis noting hepatomegaly, correlation with LFTs recommended.  3. There are a few scattered fluid-filled small bowel loops, nonspecific.  This may be on the basis of developing right colonic constipation however early enteritis remains a consideration.  Correlation is advised.  4. Please see above for several additional findings.      Electronically signed by: Juan Sexton MD  Date:    09/13/2024  Time:    14:32               Narrative:    EXAMINATION:  CT ABDOMEN PELVIS WITH IV CONTRAST    CLINICAL HISTORY:  Pancreatitis, acute, severe;    TECHNIQUE:  Low dose axial images, sagittal and coronal reformations were obtained from the lung bases to the pubic symphysis following the IV administration of 75 mL of Omnipaque 350 .  Oral contrast was not given.    COMPARISON:  07/24/2024    FINDINGS:  Images of the lower thorax are unremarkable.    The liver is diffusely hypoattenuating suggesting steatosis, correlation with LFTs recommended.  The liver is enlarged.  The spleen, adrenal glands and gallbladder are unremarkable.  The stomach is relatively decompressed, no gastric wall thickening.  The pancreas enhances homogeneously without pancreatic ductal dilation.  There is a lobular fluid collection superior to the pancreas, similar to the previous examination.  On coronal imaging this measures 6.1 x 3.6 cm suggesting pseudo cyst.  No interval wall thickening or inflammation about the collection to suggest infection.  The portal vein, SMV, celiac axis and SMA all are patent.  There is some irregularity involving the splenic vein noting a few scattered perigastric collaterals.  No significant abdominal lymphadenopathy.    The kidneys enhance symmetrically without hydronephrosis or nephrolithiasis.  The bilateral ureters are unremarkable without calculi seen.  The urinary bladder is  nondistended noting there may be some residual wall thickening.  The prostate is not enlarged.    The large bowel is grossly unremarkable noting moderate stool in the right colon.  The terminal ileum and appendix are unremarkable.  There are a few scattered fluid-filled small bowel loops.  There are a few scattered shotty periaortic, pericaval, and mesenteric lymph nodes.  No focal organized pelvic fluid collection.    No acute osseous abnormalities.  No significant inguinal lymphadenopathy.

## 2024-09-13 NOTE — ED TRIAGE NOTES
Pt arrives to ED with complaints of stabbing LUQ abdominal pain . PT has hx of pancreatitis and suggests it might be a flare up of than. PT states pain started last night and has gotten worse this morning. Pt endorses increased Pain after eating. Pt States feeling nauseous diarrhea, now and vomitting  with brown colored vomit.

## 2024-09-13 NOTE — ASSESSMENT & PLAN NOTE
Patient's most recent potassium results are listed below.   Recent Labs     09/13/24  1238   K 3.4*     Plan  - Replete potassium per protocol  - Monitor potassium Daily  - Patient's hypokalemia is  pending

## 2024-09-13 NOTE — ED PROVIDER NOTES
"Chief Complaint   Abdominal Pain (NVD and abdominal pain. Hx of pancreatitis and "feels like a flare up")      History Of Present Illness   Tasia Cardona is a 24 y.o. male with a PMHx including chronic pancreatitis, necrotizing pancreatitis, pancreatic pseudocyst, alcohol use disorder, opioid dependence with opoid induced constipation, Corinne-Chauhan tear, HIV, asthma  presenting with abdominal pain.  Patient states that it feels like a pancreatitis flare-up.  He reports 1-2 days of nausea, vomiting, diarrhea, and left upper quadrant abdominal pain.  He reports no dysuria, hematuria.  He reports no hematemesis, bloody/black stool.  He reports no known fevers, or chills.  He states that the pain occasionally radiates to the left lower quadrant but it is more in his epigastric region.  He reports no recent EtOH use or other known exacerbating factors.  Per chart review, patient has a known peripancreatic fluid collection that was recently assessed by [facility] during his admission in July.    Independent Historian: Yes  Other Historian or Collateral: Chart review  Interpretor: No      Review of patient's allergies indicates:   Allergen Reactions    [place] Swelling    Pecan nut Swelling    Penicillins Hives     Tolerates cephalosporins    Tolerated piperacillin/tazobactam 11/2023    Sulfa (sulfonamide antibiotics) Hives       No current facility-administered medications on file prior to encounter.     Current Outpatient Medications on File Prior to Encounter   Medication Sig Dispense Refill    lzedpjsuq-brylhmjn-mxojszd ala (BIKTARVY) -25 mg (25 kg or greater) Take 1 tablet by mouth once daily. 30 tablet 1    pantoprazole (PROTONIX) 40 MG tablet Take 1 tablet (40 mg total) by mouth once daily. 30 tablet 0    promethazine (PHENERGAN) 25 MG tablet Take 1 tablet (25 mg total) by mouth every 4 (four) hours. 21 tablet 0       Past History   As per HPI and below:  Past Medical History:   Diagnosis Date    Acute necrotizing " pancreatitis 09/20/2023    Alcohol use disorder 10/05/2023    Asthma     Hepatitis C antibody positive in blood 09/18/2023 9/2023 PCR negative    HIV infection 10/2021    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     Past Surgical History:   Procedure Laterality Date    ENDOSCOPIC ULTRASOUND OF UPPER GASTROINTESTINAL TRACT N/A 10/23/2023    Procedure: ULTRASOUND, UPPER GI TRACT, ENDOSCOPIC;  Surgeon: Emil Villatoro MD;  Location: [facility] ([address] [place] [address]);  Service: Endoscopy;  Laterality: N/A;    ENDOSCOPIC ULTRASOUND OF UPPER GASTROINTESTINAL TRACT N/A 7/24/2024    Procedure: ULTRASOUND, UPPER GI TRACT, ENDOSCOPIC;  Surgeon: Faustino Trujillo MD;  Location: [facility] ([address] [place] [address]);  Service: Endoscopy;  Laterality: N/A;    ERCP N/A 10/23/2023    Procedure: ERCP (ENDOSCOPIC RETROGRADE CHOLANGIOPANCREATOGRAPHY);  Surgeon: Emil Villatoro MD;  Location: [facility] ([address] [place] [address]);  Service: Endoscopy;  Laterality: N/A;    ESOPHAGOGASTRODUODENOSCOPY N/A 9/18/2023    Procedure: EGD (ESOPHAGOGASTRODUODENOSCOPY);  Surgeon: Kg Cleaning MD;  [facility]  Service: Endoscopy;  Laterality: N/A;    ESOPHAGOGASTRODUODENOSCOPY N/A 3/8/2024    Procedure: EGD (ESOPHAGOGASTRODUODENOSCOPY);  Surgeon: Greg Love MD;  [facility]  Service: Endoscopy;  Laterality: N/A;    ESOPHAGOGASTRODUODENOSCOPY N/A 7/24/2024    Procedure: EGD (ESOPHAGOGASTRODUODENOSCOPY);  Surgeon: Faustino Trujillo MD;  [facility]  Service: Endoscopy;  Laterality: N/A;       Social History     Socioeconomic History    Marital status: Single   Tobacco Use    Smoking status: Former     Types: Cigarettes    Smokeless tobacco: Never   Substance and Sexual Activity    Alcohol use: Not Currently    Drug use:  "Yes     Types: Marijuana     Social Determinants of Health     Financial Resource Strain: Low Risk  (7/25/2024)    Overall Financial Resource Strain (CARDIA)     Difficulty of Paying Living Expenses: Not very hard   Food Insecurity: No Food Insecurity (7/25/2024)    Hunger Vital Sign     Worried About Running Out of Food in the Last Year: Never true     Ran Out of Food in the Last Year: Never true   Transportation Needs: No Transportation Needs (7/25/2024)    TRANSPORTATION NEEDS     Transportation : No   Physical Activity: Sufficiently Active (7/25/2024)    Exercise Vital Sign     Days of Exercise per Week: 7 days     Minutes of Exercise per Session: 90 min   Stress: No Stress Concern Present (7/25/2024)    [employer] [place] [employer] - Occupational Stress Questionnaire     Feeling of Stress : Only a little   Housing Stability: Low Risk  (7/25/2024)    Housing Stability Vital Sign     Unable to Pay for Housing in the Last Year: No     Homeless in the Last Year: No       Family History   Problem Relation Name Age of Onset    Pancreatitis Mother      Cancer Mother      Ovarian cancer Mother      No Known Problems Father      Cancer Brother      Breast cancer Maternal Grandmother         Physical Exam     Vitals:    09/13/24 1045   BP: 122/76   BP Location: Right arm   Pulse: 104   Resp: 18   Temp: 98.1 °F (36.7 °C)   TempSrc: Oral   SpO2: 99%   Weight: 65.8 kg (145 lb)   Height: 5' 10" (1.778 m)       Physical Exam  Constitutional:       General: He is not in acute distress.     Appearance: He is well-developed. He is not toxic-appearing or diaphoretic.   HENT:      Head: Normocephalic and atraumatic.      Nose: Nose normal. No congestion.      Mouth/Throat:      Mouth: Mucous membranes are moist.      Pharynx: Oropharynx is clear.   Eyes:      Extraocular Movements: Extraocular movements intact.      Pupils: Pupils are equal, round, and reactive to light.   Cardiovascular:      Rate and Rhythm: Normal " rate and regular rhythm.   Pulmonary:      Effort: No respiratory distress.      Breath sounds: No wheezing or rhonchi.   Abdominal:      General: There is no distension.      Tenderness: There is abdominal tenderness in the epigastric area, left upper quadrant and left lower quadrant. There is no guarding or rebound. Negative signs include Bland's sign and McBurney's sign.   Musculoskeletal:         General: No deformity.      Cervical back: No rigidity.   Skin:     General: Skin is warm and dry.      Capillary Refill: Capillary refill takes less than 2 seconds.   Neurological:      General: No focal deficit present.      Mental Status: He is alert and oriented to person, place, and time.             Results     Labs Reviewed   CBC W/ AUTO DIFFERENTIAL   COMPREHENSIVE METABOLIC PANEL   LIPASE   URINALYSIS, REFLEX TO URINE CULTURE   MAGNESIUM       Imaging Results    None           Initial MDM   Medical Decision Making  Patient is a 24-year-old male presenting with abdominal pain.  Patient was does not with pancreatitis given his prior episodes of the same.  Consider complications from pancreatitis including pancreatic stenosis, pancreatic duct stricture, necrotizing pancreatitis.  Also consider other sources of abdominal pain including renal colic, diverticulitis, colitis.  Will get workup including labs and CT of abdomen/pelvis.  Will treat patient's pain and nausea in the meantime    Amount and/or Complexity of Data Reviewed  Labs: ordered. Decision-making details documented in ED Course.  Radiology: ordered. Decision-making details documented in ED Course.    Risk  Prescription drug management.                  Medications Given / Interventions     Medications   ondansetron injection 4 mg (has no administration in time range)   lactated ringers bolus 1,000 mL (has no administration in time range)   morphine injection 4 mg (has no administration in time range)       Procedures     ED POCUS Performed:  No    Reassessment and ED Course     ED Course as of 09/17/24 1348   Fri Sep 13, 2024   1356 Lipase: 60 [CH]   1358 CBC grossly okay [CH]   1358 CMP with minimal hypokalemia, minimal hypocalcemia, mildly elevated LFTs [CH]   1516 CT Abdomen Pelvis With IV Contrast NO Oral Contrast  CT notable for stable pancreatic pseudocyst without signs of inflammation or infection.  CT also notable for possible early signs of enteritis. [CH]   1517 Discussed results with the patient.  Patient is still having abdominal pain, nausea.  Will give additional pain and nausea medications and reassess. [CH]      ED Course User Index  [CH] Olive Velasco MD   Reevaluated patient. He has continued pain, and nausea. Discussed with  for admission for uncontrolled sxs.            Final diagnoses:  [R11.10] Vomiting                 Dispo                        Olive Velasco MD  09/17/24 1350

## 2024-09-14 LAB
ANION GAP SERPL CALC-SCNC: 10 MMOL/L (ref 8–16)
BASOPHILS # BLD AUTO: 0.02 K/UL (ref 0–0.2)
BASOPHILS NFR BLD: 0.3 % (ref 0–1.9)
BUN SERPL-MCNC: 4 MG/DL (ref 6–20)
CALCIUM SERPL-MCNC: 8.6 MG/DL (ref 8.7–10.5)
CHLORIDE SERPL-SCNC: 101 MMOL/L (ref 95–110)
CHOLEST SERPL-MCNC: 99 MG/DL (ref 120–199)
CHOLEST/HDLC SERPL: 2.3 {RATIO} (ref 2–5)
CO2 SERPL-SCNC: 25 MMOL/L (ref 23–29)
CREAT SERPL-MCNC: 0.8 MG/DL (ref 0.5–1.4)
DIFFERENTIAL METHOD BLD: ABNORMAL
EOSINOPHIL # BLD AUTO: 0.2 K/UL (ref 0–0.5)
EOSINOPHIL NFR BLD: 2.9 % (ref 0–8)
ERYTHROCYTE [DISTWIDTH] IN BLOOD BY AUTOMATED COUNT: 18.1 % (ref 11.5–14.5)
EST. GFR  (NO RACE VARIABLE): >60 ML/MIN/1.73 M^2
GLUCOSE SERPL-MCNC: 89 MG/DL (ref 70–110)
HCT VFR BLD AUTO: 36.3 % (ref 40–54)
HDLC SERPL-MCNC: 43 MG/DL (ref 40–75)
HDLC SERPL: 43.4 % (ref 20–50)
HGB BLD-MCNC: 10.6 G/DL (ref 14–18)
IMM GRANULOCYTES # BLD AUTO: 0.01 K/UL (ref 0–0.04)
IMM GRANULOCYTES NFR BLD AUTO: 0.2 % (ref 0–0.5)
LDLC SERPL CALC-MCNC: 45.4 MG/DL (ref 63–159)
LYMPHOCYTES # BLD AUTO: 2.2 K/UL (ref 1–4.8)
LYMPHOCYTES NFR BLD: 35.9 % (ref 18–48)
MAGNESIUM SERPL-MCNC: 1.8 MG/DL (ref 1.6–2.6)
MCH RBC QN AUTO: 24.8 PG (ref 27–31)
MCHC RBC AUTO-ENTMCNC: 29.2 G/DL (ref 32–36)
MCV RBC AUTO: 85 FL (ref 82–98)
MONOCYTES # BLD AUTO: 0.5 K/UL (ref 0.3–1)
MONOCYTES NFR BLD: 8.5 % (ref 4–15)
NEUTROPHILS # BLD AUTO: 3.2 K/UL (ref 1.8–7.7)
NEUTROPHILS NFR BLD: 52.2 % (ref 38–73)
NONHDLC SERPL-MCNC: 56 MG/DL
NRBC BLD-RTO: 0 /100 WBC
PHOSPHATE SERPL-MCNC: 3.4 MG/DL (ref 2.7–4.5)
PLATELET # BLD AUTO: 162 K/UL (ref 150–450)
PMV BLD AUTO: 9.6 FL (ref 9.2–12.9)
POTASSIUM SERPL-SCNC: 3.4 MMOL/L (ref 3.5–5.1)
RBC # BLD AUTO: 4.28 M/UL (ref 4.6–6.2)
SODIUM SERPL-SCNC: 136 MMOL/L (ref 136–145)
TRIGL SERPL-MCNC: 53 MG/DL (ref 30–150)
WBC # BLD AUTO: 6.13 K/UL (ref 3.9–12.7)

## 2024-09-14 PROCEDURE — 93010 ELECTROCARDIOGRAM REPORT: CPT | Mod: ,,, | Performed by: INTERNAL MEDICINE

## 2024-09-14 PROCEDURE — 93005 ELECTROCARDIOGRAM TRACING: CPT

## 2024-09-14 PROCEDURE — 80048 BASIC METABOLIC PNL TOTAL CA: CPT | Performed by: STUDENT IN AN ORGANIZED HEALTH CARE EDUCATION/TRAINING PROGRAM

## 2024-09-14 PROCEDURE — 36415 COLL VENOUS BLD VENIPUNCTURE: CPT | Performed by: STUDENT IN AN ORGANIZED HEALTH CARE EDUCATION/TRAINING PROGRAM

## 2024-09-14 PROCEDURE — 84100 ASSAY OF PHOSPHORUS: CPT | Performed by: STUDENT IN AN ORGANIZED HEALTH CARE EDUCATION/TRAINING PROGRAM

## 2024-09-14 PROCEDURE — 11000001 HC ACUTE MED/SURG PRIVATE ROOM

## 2024-09-14 PROCEDURE — 80061 LIPID PANEL: CPT | Performed by: STUDENT IN AN ORGANIZED HEALTH CARE EDUCATION/TRAINING PROGRAM

## 2024-09-14 PROCEDURE — 63600175 PHARM REV CODE 636 W HCPCS: Performed by: STUDENT IN AN ORGANIZED HEALTH CARE EDUCATION/TRAINING PROGRAM

## 2024-09-14 PROCEDURE — 25000003 PHARM REV CODE 250: Performed by: STUDENT IN AN ORGANIZED HEALTH CARE EDUCATION/TRAINING PROGRAM

## 2024-09-14 PROCEDURE — 83735 ASSAY OF MAGNESIUM: CPT | Performed by: STUDENT IN AN ORGANIZED HEALTH CARE EDUCATION/TRAINING PROGRAM

## 2024-09-14 PROCEDURE — G0378 HOSPITAL OBSERVATION PER HR: HCPCS

## 2024-09-14 PROCEDURE — 85025 COMPLETE CBC W/AUTO DIFF WBC: CPT | Performed by: STUDENT IN AN ORGANIZED HEALTH CARE EDUCATION/TRAINING PROGRAM

## 2024-09-14 RX ORDER — POTASSIUM CHLORIDE 20 MEQ/1
40 TABLET, EXTENDED RELEASE ORAL
Status: DISPENSED | OUTPATIENT
Start: 2024-09-14 | End: 2024-09-14

## 2024-09-14 RX ORDER — POTASSIUM CHLORIDE 20 MEQ/1
40 TABLET, EXTENDED RELEASE ORAL
Status: DISCONTINUED | OUTPATIENT
Start: 2024-09-14 | End: 2024-09-14

## 2024-09-14 RX ORDER — HYDROMORPHONE HYDROCHLORIDE 2 MG/ML
1.5 INJECTION, SOLUTION INTRAMUSCULAR; INTRAVENOUS; SUBCUTANEOUS
Status: DISCONTINUED | OUTPATIENT
Start: 2024-09-14 | End: 2024-09-15

## 2024-09-14 RX ORDER — POTASSIUM CHLORIDE 20 MEQ/1
40 TABLET, EXTENDED RELEASE ORAL ONCE
Status: DISCONTINUED | OUTPATIENT
Start: 2024-09-14 | End: 2024-09-14

## 2024-09-14 RX ORDER — HYDROMORPHONE HYDROCHLORIDE 2 MG/ML
1.5 INJECTION, SOLUTION INTRAMUSCULAR; INTRAVENOUS; SUBCUTANEOUS EVERY 4 HOURS PRN
Status: DISCONTINUED | OUTPATIENT
Start: 2024-09-14 | End: 2024-09-14

## 2024-09-14 RX ORDER — OXYCODONE HYDROCHLORIDE 10 MG/1
10 TABLET ORAL EVERY 4 HOURS PRN
Status: DISCONTINUED | OUTPATIENT
Start: 2024-09-14 | End: 2024-09-17

## 2024-09-14 RX ORDER — HYDROMORPHONE HYDROCHLORIDE 1 MG/ML
1 INJECTION, SOLUTION INTRAMUSCULAR; INTRAVENOUS; SUBCUTANEOUS
Status: DISCONTINUED | OUTPATIENT
Start: 2024-09-14 | End: 2024-09-14

## 2024-09-14 RX ORDER — LACTULOSE 10 G/15ML
30 SOLUTION ORAL 3 TIMES DAILY
Status: DISCONTINUED | OUTPATIENT
Start: 2024-09-14 | End: 2024-09-15

## 2024-09-14 RX ADMIN — PROMETHAZINE HYDROCHLORIDE 6.25 MG: 25 INJECTION INTRAMUSCULAR; INTRAVENOUS at 06:09

## 2024-09-14 RX ADMIN — LACTULOSE 30 G: 20 SOLUTION ORAL at 08:09

## 2024-09-14 RX ADMIN — POTASSIUM CHLORIDE 40 MEQ: 1500 TABLET, EXTENDED RELEASE ORAL at 12:09

## 2024-09-14 RX ADMIN — BICTEGRAVIR SODIUM, EMTRICITABINE, AND TENOFOVIR ALAFENAMIDE FUMARATE 1 TABLET: 50; 200; 25 TABLET ORAL at 08:09

## 2024-09-14 RX ADMIN — HYDROMORPHONE HYDROCHLORIDE 1.5 MG: 2 INJECTION, SOLUTION INTRAMUSCULAR; INTRAVENOUS; SUBCUTANEOUS at 05:09

## 2024-09-14 RX ADMIN — SODIUM CHLORIDE, POTASSIUM CHLORIDE, SODIUM LACTATE AND CALCIUM CHLORIDE: 600; 310; 30; 20 INJECTION, SOLUTION INTRAVENOUS at 08:09

## 2024-09-14 RX ADMIN — HYDROMORPHONE HYDROCHLORIDE 1.5 MG: 2 INJECTION, SOLUTION INTRAMUSCULAR; INTRAVENOUS; SUBCUTANEOUS at 02:09

## 2024-09-14 RX ADMIN — HYDROMORPHONE HYDROCHLORIDE 1.5 MG: 2 INJECTION, SOLUTION INTRAMUSCULAR; INTRAVENOUS; SUBCUTANEOUS at 09:09

## 2024-09-14 RX ADMIN — POTASSIUM CHLORIDE 40 MEQ: 1500 TABLET, EXTENDED RELEASE ORAL at 10:09

## 2024-09-14 RX ADMIN — SODIUM CHLORIDE, POTASSIUM CHLORIDE, SODIUM LACTATE AND CALCIUM CHLORIDE: 600; 310; 30; 20 INJECTION, SOLUTION INTRAVENOUS at 10:09

## 2024-09-14 RX ADMIN — OXYCODONE 15 MG: 5 TABLET ORAL at 12:09

## 2024-09-14 RX ADMIN — LACTULOSE 20 G: 20 SOLUTION ORAL at 08:09

## 2024-09-14 RX ADMIN — LACTULOSE 30 G: 20 SOLUTION ORAL at 02:09

## 2024-09-14 RX ADMIN — PROMETHAZINE HYDROCHLORIDE 6.25 MG: 25 INJECTION INTRAMUSCULAR; INTRAVENOUS at 10:09

## 2024-09-14 RX ADMIN — HYDROMORPHONE HYDROCHLORIDE 2 MG: 1 INJECTION, SOLUTION INTRAMUSCULAR; INTRAVENOUS; SUBCUTANEOUS at 07:09

## 2024-09-14 RX ADMIN — HYDROMORPHONE HYDROCHLORIDE 2 MG: 1 INJECTION, SOLUTION INTRAMUSCULAR; INTRAVENOUS; SUBCUTANEOUS at 03:09

## 2024-09-14 RX ADMIN — HYDROMORPHONE HYDROCHLORIDE 1 MG: 1 INJECTION, SOLUTION INTRAMUSCULAR; INTRAVENOUS; SUBCUTANEOUS at 10:09

## 2024-09-14 NOTE — PROGRESS NOTES
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  Notes feeling in his usual state of health yesterday.  However, this morning around 4:00 a.m. he was awoken by a sharp, stabbing abdominal pain.  He notes the abdominal pain is mostly in his left upper quadrant; radiated a little bit to the center and lower left quadrant.  It was so severe it caused him to have nausea and vomit.  He was concerned about his emesis as it was brown appearing. He reports no hematemesis, melena, BRBPR.  The abdominal pain is pretty much constant. He denies any fever or chills preceding the abdominal pain, however had some chills after a bout of vomiting.  He was unable to tolerate a bagel this morning.  He has been tolerating small sips of water in the emergency department since arriving this morning around 11.  In terms of diarrhea he reported to the ED, he notes that he had several very small watery bowel movements yesterday; his last good bowel movement was about a week ago.    Of note, patient was recently admitted to Hospital Medicine 7/23-7/29 with similar symptoms. 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  He was evaluated by IR; his collection was deemed too deep and surrounded by too many varices to afford good percutaneous window for drainage.  AES was then consulted; fluid collection was deemed too complex to be drained.  A CTA however was ordered to evaluate for any component of hemorrhagic pancreatitis; CTA was without signs of hemorrhagic pancreatitis.    Today, on admission, potassium mildly low at 3.4, lipase within normal limits, CT abdomen pelvis with IV contrast notable for stable fluid collection along the superior aspect of pancreas suggesting pseudocyst without surrounding inflammation to suggest interval infection; no convincing peripancreatic inflammation.  Early enteritis or developing right colonic constipation was also noted.    Overview/Hospital Course:  Admitted to hospital medicine for management of acute on chronic pancreatitis. Initiated on IV anti-emetics, IV pain control, and IVF.     Interval History:  Patient seen and examined at bedside.    Continued abdominal pain, nausea, 1 episode of emesis overnight.  Describes emesis as brown, however not coffee-ground. Did have small Bm.  Remains with significant abdominal pain this morning.  He would like to try and advance his diet today, however.  Patient updated regarding care plan.      Review of Systems   Constitutional:  Negative for fever.   HENT:  Negative for congestion, sore throat and trouble swallowing.    Eyes:  Negative for visual disturbance.   Respiratory:  Negative for cough, chest tightness and shortness of breath.    Cardiovascular:  Negative for chest pain, palpitations and leg swelling.   Gastrointestinal:  Positive for abdominal pain, constipation, nausea and vomiting.   Genitourinary:  Negative for dysuria.   Musculoskeletal:  Negative for arthralgias and myalgias.   Skin:  Negative for rash.   Neurological:   Negative for dizziness and light-headedness.   Psychiatric/Behavioral:  Negative for confusion and decreased concentration.        Objective:    Temp: 98.5 °F (36.9 °C) (09/14/24 1152)  Pulse: 95 (09/14/24 1152)  Resp: 18 (09/14/24 1204)  BP: 118/76 (09/14/24 1152)  SpO2: 95 % (09/14/24 1152)    Weight: 65.8 kg (145 lb) (09/13/24 1045)    Body mass index is 20.81 kg/m².      Intake/Output Summary (Last 24 hours) at 9/14/2024 1248  Last data filed at 9/13/2024 1517  Gross per 24 hour   Intake 1000 ml   Output --   Net 1000 ml       Physical Exam  Vitals and nursing note reviewed.   Constitutional:       General: He is not in acute distress.     Appearance: He is not ill-appearing, toxic-appearing or diaphoretic.   HENT:      Head: Normocephalic and atraumatic.      Nose: Nose normal.      Mouth/Throat:      Mouth: Mucous membranes are dry.      Pharynx: Oropharynx is clear.   Eyes:      General: No scleral icterus.  Cardiovascular:      Rate and Rhythm: Normal rate and regular rhythm.      Pulses: Normal pulses.      Heart sounds: Normal heart sounds.   Pulmonary:      Effort: Pulmonary effort is normal.      Breath sounds: Normal breath sounds.   Abdominal:      General: Bowel sounds are increased. There is distension.      Tenderness: There is abdominal tenderness in the epigastric area, left upper quadrant and left lower quadrant. There is no guarding or rebound.   Musculoskeletal:      Cervical back: Normal range of motion and neck supple.      Right lower leg: No edema.      Left lower leg: No edema.   Skin:     General: Skin is warm and dry.      Capillary Refill: Capillary refill takes less than 2 seconds.   Neurological:      Mental Status: He is alert and oriented to person, place, and time.   Psychiatric:         Behavior: Behavior normal.         Significant Labs: All pertinent labs within the past 24 hours have been reviewed.    Recent Results (from the past 24 hour(s))   Toxicology screen, urine     Collection Time: 09/13/24  7:46 PM   Result Value Ref Range    Alcohol, Urine <10 <10 mg/dL    Benzodiazepines Negative Negative    Methadone metabolites Negative Negative    Cocaine (Metab.) Negative Negative    Opiate Scrn, Ur Presumptive Positive (A) Negative    Barbiturate Screen, Ur Negative Negative    Amphetamine Screen, Ur Negative Negative    THC Presumptive Positive (A) Negative    Phencyclidine Negative Negative    Creatinine, Urine 56.0 23.0 - 375.0 mg/dL    Toxicology Information SEE COMMENT    Urinalysis, Reflex to Urine Culture Urine, Clean Catch    Collection Time: 09/13/24  7:46 PM    Specimen: Urine   Result Value Ref Range    Specimen UA Urine, Clean Catch     Color, UA Yellow Yellow, Straw, Xin    Appearance, UA Clear Clear    pH, UA 7.0 5.0 - 8.0    Specific Gravity, UA 1.025 1.005 - 1.030    Protein, UA Negative Negative    Glucose, UA Negative Negative    Ketones, UA Negative Negative    Bilirubin (UA) Negative Negative    Occult Blood UA Negative Negative    Nitrite, UA Negative Negative    Leukocytes, UA Negative Negative   Basic metabolic panel    Collection Time: 09/14/24  3:37 AM   Result Value Ref Range    Sodium 136 136 - 145 mmol/L    Potassium 3.4 (L) 3.5 - 5.1 mmol/L    Chloride 101 95 - 110 mmol/L    CO2 25 23 - 29 mmol/L    Glucose 89 70 - 110 mg/dL    BUN 4 (L) 6 - 20 mg/dL    Creatinine 0.8 0.5 - 1.4 mg/dL    Calcium 8.6 (L) 8.7 - 10.5 mg/dL    Anion Gap 10 8 - 16 mmol/L    eGFR >60.0 >60 mL/min/1.73 m^2   Magnesium    Collection Time: 09/14/24  3:37 AM   Result Value Ref Range    Magnesium 1.8 1.6 - 2.6 mg/dL   Phosphorus    Collection Time: 09/14/24  3:37 AM   Result Value Ref Range    Phosphorus 3.4 2.7 - 4.5 mg/dL   CBC auto differential    Collection Time: 09/14/24  3:37 AM   Result Value Ref Range    WBC 6.13 3.90 - 12.70 K/uL    RBC 4.28 (L) 4.60 - 6.20 M/uL    Hemoglobin 10.6 (L) 14.0 - 18.0 g/dL    Hematocrit 36.3 (L) 40.0 - 54.0 %    MCV 85 82 - 98 fL    MCH 24.8 (L)  27.0 - 31.0 pg    MCHC 29.2 (L) 32.0 - 36.0 g/dL    RDW 18.1 (H) 11.5 - 14.5 %    Platelets 162 150 - 450 K/uL    MPV 9.6 9.2 - 12.9 fL    Immature Granulocytes 0.2 0.0 - 0.5 %    Gran # (ANC) 3.2 1.8 - 7.7 K/uL    Immature Grans (Abs) 0.01 0.00 - 0.04 K/uL    Lymph # 2.2 1.0 - 4.8 K/uL    Mono # 0.5 0.3 - 1.0 K/uL    Eos # 0.2 0.0 - 0.5 K/uL    Baso # 0.02 0.00 - 0.20 K/uL    nRBC 0 0 /100 WBC    Gran % 52.2 38.0 - 73.0 %    Lymph % 35.9 18.0 - 48.0 %    Mono % 8.5 4.0 - 15.0 %    Eosinophil % 2.9 0.0 - 8.0 %    Basophil % 0.3 0.0 - 1.9 %    Differential Method Automated    Lipid panel    Collection Time: 09/14/24  3:37 AM   Result Value Ref Range    Cholesterol 99 (L) 120 - 199 mg/dL    Triglycerides 53 30 - 150 mg/dL    HDL 43 40 - 75 mg/dL    LDL Cholesterol 45.4 (L) 63.0 - 159.0 mg/dL    HDL/Cholesterol Ratio 43.4 20.0 - 50.0 %    Total Cholesterol/HDL Ratio 2.3 2.0 - 5.0    Non-HDL Cholesterol 56 mg/dL       Significant Imaging: I have reviewed all pertinent imaging results/findings within the past 24 hours.    Imaging Results              CT Abdomen Pelvis With IV Contrast NO Oral Contrast (Final result)  Result time 09/13/24 14:32:21      Final result by Juan Sexton MD (09/13/24 14:32:21)                   Impression:      1. Low attenuating fluid collection along the superior aspect of the pancreas suggests pseudo cyst, similar in appearance to the previous exam.  No surrounding inflammation to suggest interval infection.  No convincing peripancreatic inflammation.  Correlation with laboratory values is advised as CT findings of acute pancreatitis lag behind laboratory abnormality.  2. Findings suggest hepatic steatosis noting hepatomegaly, correlation with LFTs recommended.  3. There are a few scattered fluid-filled small bowel loops, nonspecific.  This may be on the basis of developing right colonic constipation however early enteritis remains a consideration.  Correlation is advised.  4. Please  see above for several additional findings.      Electronically signed by: Juan Sexton MD  Date:    09/13/2024  Time:    14:32               Narrative:    EXAMINATION:  CT ABDOMEN PELVIS WITH IV CONTRAST    CLINICAL HISTORY:  Pancreatitis, acute, severe;    TECHNIQUE:  Low dose axial images, sagittal and coronal reformations were obtained from the lung bases to the pubic symphysis following the IV administration of 75 mL of Omnipaque 350 .  Oral contrast was not given.    COMPARISON:  07/24/2024    FINDINGS:  Images of the lower thorax are unremarkable.    The liver is diffusely hypoattenuating suggesting steatosis, correlation with LFTs recommended.  The liver is enlarged.  The spleen, adrenal glands and gallbladder are unremarkable.  The stomach is relatively decompressed, no gastric wall thickening.  The pancreas enhances homogeneously without pancreatic ductal dilation.  There is a lobular fluid collection superior to the pancreas, similar to the previous examination.  On coronal imaging this measures 6.1 x 3.6 cm suggesting pseudo cyst.  No interval wall thickening or inflammation about the collection to suggest infection.  The portal vein, SMV, celiac axis and SMA all are patent.  There is some irregularity involving the splenic vein noting a few scattered perigastric collaterals.  No significant abdominal lymphadenopathy.    The kidneys enhance symmetrically without hydronephrosis or nephrolithiasis.  The bilateral ureters are unremarkable without calculi seen.  The urinary bladder is nondistended noting there may be some residual wall thickening.  The prostate is not enlarged.    The large bowel is grossly unremarkable noting moderate stool in the right colon.  The terminal ileum and appendix are unremarkable.  There are a few scattered fluid-filled small bowel loops.  There are a few scattered shotty periaortic, pericaval, and mesenteric lymph nodes.  No focal organized pelvic fluid collection.    No  acute osseous abnormalities.  No significant inguinal lymphadenopathy.                                        Assessment/Plan:      * Acute on chronic pancreatitis  Nausea and vomiting  Abdominal pain  Fluid collection of pancreas    Lipase 60  Lipid panel added; previously without hypertriglyceridemia - TG 53  Unclear etiology; notably, with PV and history of thrombus, always a consideration however CTA last admission unremarkable  CT scan on admission with stable fluid collection of pancreas; no convincing peripancreatic inflammation or surrounding inflammation to suggest interval infection  Continue aggressive IVF  Schedule IV antiemetics today  Pain control - IV until able to tolerate p.o. options reliably  ADAT    Constipation  Concern for overflow diarrhea  Address with bowel regimen; may be contributing to abdominal pain, nausea, vomiting      Hypokalemia  Patient's most recent potassium results are listed below.   Recent Labs     09/13/24  1238 09/14/24  0337   K 3.4* 3.4*       Plan  - Replete potassium per protocol  - Monitor potassium Daily  - Patient's hypokalemia is stable      VTE Risk Mitigation (From admission, onward)           Ordered     heparin (porcine) injection 5,000 Units  Every 8 hours         09/13/24 1654     IP VTE HIGH RISK PATIENT  Once         09/13/24 1654     Place sequential compression device  Until discontinued         09/13/24 1654                    Discharge Planning   CASTILLO: 9/16/2024     Code Status: Full Code   Is the patient medically ready for discharge?:     Reason for patient still in hospital (select all that apply): Patient trending condition, Treatment, and Pending disposition  Discharge Plan A: Home                  Stephanie Xavier MD  Department of Hospital Medicine   [facility] - Internal Medicine Telemetry

## 2024-09-14 NOTE — ED NOTES
Patient identifiers for Tasia Cardona checked and correct.    LOC: The patient is awake, alert and aware of environment with an appropriate affect, the patient is oriented x 4 and speaking appropriately.    APPEARANCE: Patient resting comfortably and in no acute distress, patient is clean and well groomed, patient's clothing is properly fastened.    SKIN: The skin is warm and dry, color consistent with ethnicity, patient has normal skin turgor and moist mucus membranes, skin intact, no breakdown or bruising noted.    MUSCULOSKELETAL: Patient moving all extremities well, no obvious swelling or deformities noted. +Generalized weakness     RESPIRATORY: Airway is open and patent, respirations are spontaneous and even, patient has a normal effort and rate.    CARDIAC: Patient has a normal rate and rhythm, no periphreal edema noted, capillary refill < 3 seconds. Normal +2 pedal pulses present.    ABDOMEN: Soft and non tender to palpation, no distention noted. Patient denies any nausea, vomiting, diarrhea, or constipation. +Abdominal pain +Nausea     NEUROLOGIC: Eyes open spontaneously, PERRL, behavior appropriate to situation, follows commands, facial expression symmetrical, bilateral hand grasp equal and even, purposeful motor response noted, normal sensation in all extremities.     HEENT: No abnormalities noted. White sclera and pupils equal round and reactive to light. Denies headache, dizziness.     : Pt voids independently, denies dysuria, hematuria, frequency.

## 2024-09-14 NOTE — ASSESSMENT & PLAN NOTE
Patient's most recent potassium results are listed below.   Recent Labs     09/13/24  1238 09/14/24  0337   K 3.4* 3.4*       Plan  - Replete potassium per protocol  - Monitor potassium Daily  - Patient's hypokalemia is stable

## 2024-09-14 NOTE — PLAN OF CARE
[facility] - Internal Medicine Telemetry  Initial Discharge Assessment       Primary Care Provider: Pina Jones NP    Admission Diagnosis: Vomiting [R11.10]  HIV infection, unspecified symptom status [B20]    Admission Date: 9/13/2024  Expected Discharge Date: 9/16/2024    Transition of Care Barriers: None    Payor: MEDICAID / Plan: [employer]ET[employer] [facility] / Product Type: Managed Medicaid /     Extended Emergency Contact Information  Primary Emergency Contact: Denice Cardona  Mobile Phone: [phone]  Relation: Mother  Preferred language: English   needed? No    Discharge Plan A: Home  Discharge Plan B: Home      [id number] - [place], [place] - [address] AT [facility] [address]  [address]  [facility] [address]  Phone: [phone] Fax: [phone]    [facility] [place] - [place] - [address]  [address] [facility] [place] [address]  Phone: [phone] Fax: [phone]    [facility]  [address]  [facility] [address]  Phone: [phone] Fax: [phone]    CM obtained discharge planning assessment with patient.    Initial Assessment (most recent)       Adult Discharge Assessment - 09/14/24 1209          Discharge Assessment    Assessment Type Discharge Planning Assessment     Confirmed/corrected address, phone number and insurance Yes     Confirmed Demographics Correct on Facesheet     Source of Information patient     Communicated CASTILLO with patient/caregiver Date not available/Unable to determine     Reason For Admission Acute on chronic pancreatitis     People in Home alone     Do you expect to return to your current living situation? Yes     Do you have help at home or someone to help you manage your care at home? No     Prior to hospitilization cognitive status: Alert/Oriented     Current cognitive status: Alert/Oriented     Walking or Climbing Stairs Difficulty no      Dressing/Bathing Difficulty no     Equipment Currently Used at Home none     Readmission within 30 days? No     Patient currently being followed by outpatient case management? No     Do you currently have service(s) that help you manage your care at home? No     Do you take prescription medications? Yes     Do you have prescription coverage? Yes     Coverage MEDICAID - [facility]     Do you have any problems affording any of your prescribed medications? No     Is the patient taking medications as prescribed? yes     Who is going to help you get home at discharge? family     How do you get to doctors appointments? car, drives self     Are you on dialysis? No     Do you take coumadin? No     Discharge Plan A Home     Discharge Plan B Home     DME Needed Upon Discharge  none     Discharge Plan discussed with: Patient     Transition of Care Barriers None        Physical Activity    On average, how many days per week do you engage in moderate to strenuous exercise (like a brisk walk)? 4 days     On average, how many minutes do you engage in exercise at this level? 60 min        Financial Resource Strain    How hard is it for you to pay for the very basics like food, housing, medical care, and heating? Not hard at all        Housing Stability    In the last 12 months, was there a time when you were not able to pay the mortgage or rent on time? No     At any time in the past 12 months, were you homeless or living in a shelter (including now)? No        Transportation Needs    Has the lack of transportation kept you from medical appointments, meetings, work or from getting things needed for daily living? No        Food Insecurity    Within the past 12 months, you worried that your food would run out before you got the money to buy more. Never true     Within the past 12 months, the food you bought just didn't last and you didn't have money to get more. Never true        Stress    Do you feel stress -  tense, restless, nervous, or anxious, or unable to sleep at night because your mind is troubled all the time - these days? Rather much        Social Isolation    How often do you feel lonely or isolated from those around you?  Never        Alcohol Use    Q1: How often do you have a drink containing alcohol? Never     Q2: How many drinks containing alcohol do you have on a typical day when you are drinking? Patient does not drink     Q3: How often do you have six or more drinks on one occasion? Never        Utilities    In the past 12 months has the electric, gas, oil, or water company threatened to shut off services in your home? No        Health Literacy    How often do you need to have someone help you when you read instructions, pamphlets, or other written material from your doctor or pharmacy? Never

## 2024-09-14 NOTE — HOSPITAL COURSE
Admitted to hospital medicine for management of acute on chronic pancreatitis. Initiated on IV anti-emetics, IV pain control, and IVF. Deferring DVT ppx despite counseling. Due to ongoing, severe pain, ultrasound liver with Doppler ordered to evaluate abdominal venous vasculature.  No obvious venous thrombus visualized.  Lactic acid also within normal limits. Patient treated with supportive care with IVF and pain control. Patient symptomatically improved and asking to be discharged. Able to tolerate diet. Medications refilled. Outpatient appointments requested.        Physical Exam  Constitutional:       General: He is not in acute distress.     Appearance: He is not ill-appearing.   Cardiovascular:      Rate and Rhythm: Normal rate.      Heart sounds: Normal heart sounds.   Pulmonary:      Effort: Pulmonary effort is normal. No respiratory distress.      Breath sounds: Normal breath sounds.   Abdominal:      General: Abdomen is flat. There is no distension.      Palpations: Abdomen is soft.      Tenderness: There is mild abdominal tenderness.   Musculoskeletal:      Right lower leg: No edema.      Left lower leg: No edema.   Neurological:      Mental Status: He is alert and oriented to person, place, and time. Mental status is at baseline.

## 2024-09-14 NOTE — SUBJECTIVE & OBJECTIVE
Interval History:  Patient seen and examined at bedside.    Continued abdominal pain, nausea, 1 episode of emesis overnight.  Describes emesis as brown, however not coffee-ground. Did have small Bm.  Remains with significant abdominal pain this morning.  He would like to try and advance his diet today, however.  Patient updated regarding care plan.      Review of Systems   Constitutional:  Negative for fever.   HENT:  Negative for congestion, sore throat and trouble swallowing.    Eyes:  Negative for visual disturbance.   Respiratory:  Negative for cough, chest tightness and shortness of breath.    Cardiovascular:  Negative for chest pain, palpitations and leg swelling.   Gastrointestinal:  Positive for abdominal pain, constipation, nausea and vomiting.   Genitourinary:  Negative for dysuria.   Musculoskeletal:  Negative for arthralgias and myalgias.   Skin:  Negative for rash.   Neurological:  Negative for dizziness and light-headedness.   Psychiatric/Behavioral:  Negative for confusion and decreased concentration.        Objective:    Temp: 98.5 °F (36.9 °C) (09/14/24 1152)  Pulse: 95 (09/14/24 1152)  Resp: 18 (09/14/24 1204)  BP: 118/76 (09/14/24 1152)  SpO2: 95 % (09/14/24 1152)    Weight: 65.8 kg (145 lb) (09/13/24 1045)    Body mass index is 20.81 kg/m².      Intake/Output Summary (Last 24 hours) at 9/14/2024 1248  Last data filed at 9/13/2024 1517  Gross per 24 hour   Intake 1000 ml   Output --   Net 1000 ml       Physical Exam  Vitals and nursing note reviewed.   Constitutional:       General: He is not in acute distress.     Appearance: He is not ill-appearing, toxic-appearing or diaphoretic.   HENT:      Head: Normocephalic and atraumatic.      Nose: Nose normal.      Mouth/Throat:      Mouth: Mucous membranes are dry.      Pharynx: Oropharynx is clear.   Eyes:      General: No scleral icterus.  Cardiovascular:      Rate and Rhythm: Normal rate and regular rhythm.      Pulses: Normal pulses.      Heart  sounds: Normal heart sounds.   Pulmonary:      Effort: Pulmonary effort is normal.      Breath sounds: Normal breath sounds.   Abdominal:      General: Bowel sounds are increased. There is distension.      Tenderness: There is abdominal tenderness in the epigastric area, left upper quadrant and left lower quadrant. There is no guarding or rebound.   Musculoskeletal:      Cervical back: Normal range of motion and neck supple.      Right lower leg: No edema.      Left lower leg: No edema.   Skin:     General: Skin is warm and dry.      Capillary Refill: Capillary refill takes less than 2 seconds.   Neurological:      Mental Status: He is alert and oriented to person, place, and time.   Psychiatric:         Behavior: Behavior normal.         Significant Labs: All pertinent labs within the past 24 hours have been reviewed.    Recent Results (from the past 24 hour(s))   Toxicology screen, urine    Collection Time: 09/13/24  7:46 PM   Result Value Ref Range    Alcohol, Urine <10 <10 mg/dL    Benzodiazepines Negative Negative    Methadone metabolites Negative Negative    Cocaine (Metab.) Negative Negative    Opiate Scrn, Ur Presumptive Positive (A) Negative    Barbiturate Screen, Ur Negative Negative    Amphetamine Screen, Ur Negative Negative    THC Presumptive Positive (A) Negative    Phencyclidine Negative Negative    Creatinine, Urine 56.0 23.0 - 375.0 mg/dL    Toxicology Information SEE COMMENT    Urinalysis, Reflex to Urine Culture Urine, Clean Catch    Collection Time: 09/13/24  7:46 PM    Specimen: Urine   Result Value Ref Range    Specimen UA Urine, Clean Catch     Color, UA Yellow Yellow, Straw, Xin    Appearance, UA Clear Clear    pH, UA 7.0 5.0 - 8.0    Specific Gravity, UA 1.025 1.005 - 1.030    Protein, UA Negative Negative    Glucose, UA Negative Negative    Ketones, UA Negative Negative    Bilirubin (UA) Negative Negative    Occult Blood UA Negative Negative    Nitrite, UA Negative Negative    Leukocytes,  UA Negative Negative   Basic metabolic panel    Collection Time: 09/14/24  3:37 AM   Result Value Ref Range    Sodium 136 136 - 145 mmol/L    Potassium 3.4 (L) 3.5 - 5.1 mmol/L    Chloride 101 95 - 110 mmol/L    CO2 25 23 - 29 mmol/L    Glucose 89 70 - 110 mg/dL    BUN 4 (L) 6 - 20 mg/dL    Creatinine 0.8 0.5 - 1.4 mg/dL    Calcium 8.6 (L) 8.7 - 10.5 mg/dL    Anion Gap 10 8 - 16 mmol/L    eGFR >60.0 >60 mL/min/1.73 m^2   Magnesium    Collection Time: 09/14/24  3:37 AM   Result Value Ref Range    Magnesium 1.8 1.6 - 2.6 mg/dL   Phosphorus    Collection Time: 09/14/24  3:37 AM   Result Value Ref Range    Phosphorus 3.4 2.7 - 4.5 mg/dL   CBC auto differential    Collection Time: 09/14/24  3:37 AM   Result Value Ref Range    WBC 6.13 3.90 - 12.70 K/uL    RBC 4.28 (L) 4.60 - 6.20 M/uL    Hemoglobin 10.6 (L) 14.0 - 18.0 g/dL    Hematocrit 36.3 (L) 40.0 - 54.0 %    MCV 85 82 - 98 fL    MCH 24.8 (L) 27.0 - 31.0 pg    MCHC 29.2 (L) 32.0 - 36.0 g/dL    RDW 18.1 (H) 11.5 - 14.5 %    Platelets 162 150 - 450 K/uL    MPV 9.6 9.2 - 12.9 fL    Immature Granulocytes 0.2 0.0 - 0.5 %    Gran # (ANC) 3.2 1.8 - 7.7 K/uL    Immature Grans (Abs) 0.01 0.00 - 0.04 K/uL    Lymph # 2.2 1.0 - 4.8 K/uL    Mono # 0.5 0.3 - 1.0 K/uL    Eos # 0.2 0.0 - 0.5 K/uL    Baso # 0.02 0.00 - 0.20 K/uL    nRBC 0 0 /100 WBC    Gran % 52.2 38.0 - 73.0 %    Lymph % 35.9 18.0 - 48.0 %    Mono % 8.5 4.0 - 15.0 %    Eosinophil % 2.9 0.0 - 8.0 %    Basophil % 0.3 0.0 - 1.9 %    Differential Method Automated    Lipid panel    Collection Time: 09/14/24  3:37 AM   Result Value Ref Range    Cholesterol 99 (L) 120 - 199 mg/dL    Triglycerides 53 30 - 150 mg/dL    HDL 43 40 - 75 mg/dL    LDL Cholesterol 45.4 (L) 63.0 - 159.0 mg/dL    HDL/Cholesterol Ratio 43.4 20.0 - 50.0 %    Total Cholesterol/HDL Ratio 2.3 2.0 - 5.0    Non-HDL Cholesterol 56 mg/dL       Significant Imaging: I have reviewed all pertinent imaging results/findings within the past 24 hours.    Imaging  Results              CT Abdomen Pelvis With IV Contrast NO Oral Contrast (Final result)  Result time 09/13/24 14:32:21      Final result by Juan Sexton MD (09/13/24 14:32:21)                   Impression:      1. Low attenuating fluid collection along the superior aspect of the pancreas suggests pseudo cyst, similar in appearance to the previous exam.  No surrounding inflammation to suggest interval infection.  No convincing peripancreatic inflammation.  Correlation with laboratory values is advised as CT findings of acute pancreatitis lag behind laboratory abnormality.  2. Findings suggest hepatic steatosis noting hepatomegaly, correlation with LFTs recommended.  3. There are a few scattered fluid-filled small bowel loops, nonspecific.  This may be on the basis of developing right colonic constipation however early enteritis remains a consideration.  Correlation is advised.  4. Please see above for several additional findings.      Electronically signed by: Juan Sexton MD  Date:    09/13/2024  Time:    14:32               Narrative:    EXAMINATION:  CT ABDOMEN PELVIS WITH IV CONTRAST    CLINICAL HISTORY:  Pancreatitis, acute, severe;    TECHNIQUE:  Low dose axial images, sagittal and coronal reformations were obtained from the lung bases to the pubic symphysis following the IV administration of 75 mL of Omnipaque 350 .  Oral contrast was not given.    COMPARISON:  07/24/2024    FINDINGS:  Images of the lower thorax are unremarkable.    The liver is diffusely hypoattenuating suggesting steatosis, correlation with LFTs recommended.  The liver is enlarged.  The spleen, adrenal glands and gallbladder are unremarkable.  The stomach is relatively decompressed, no gastric wall thickening.  The pancreas enhances homogeneously without pancreatic ductal dilation.  There is a lobular fluid collection superior to the pancreas, similar to the previous examination.  On coronal imaging this measures 6.1 x 3.6 cm  suggesting pseudo cyst.  No interval wall thickening or inflammation about the collection to suggest infection.  The portal vein, SMV, celiac axis and SMA all are patent.  There is some irregularity involving the splenic vein noting a few scattered perigastric collaterals.  No significant abdominal lymphadenopathy.    The kidneys enhance symmetrically without hydronephrosis or nephrolithiasis.  The bilateral ureters are unremarkable without calculi seen.  The urinary bladder is nondistended noting there may be some residual wall thickening.  The prostate is not enlarged.    The large bowel is grossly unremarkable noting moderate stool in the right colon.  The terminal ileum and appendix are unremarkable.  There are a few scattered fluid-filled small bowel loops.  There are a few scattered shotty periaortic, pericaval, and mesenteric lymph nodes.  No focal organized pelvic fluid collection.    No acute osseous abnormalities.  No significant inguinal lymphadenopathy.

## 2024-09-14 NOTE — ASSESSMENT & PLAN NOTE
Nausea and vomiting  Abdominal pain  Fluid collection of pancreas    Lipase 60  Lipid panel added; previously without hypertriglyceridemia - TG 53  Unclear etiology; notably, with PV and history of thrombus, always a consideration however CTA last admission unremarkable  CT scan on admission with stable fluid collection of pancreas; no convincing peripancreatic inflammation or surrounding inflammation to suggest interval infection  Continue aggressive IVF  Schedule IV antiemetics today  Pain control - IV until able to tolerate p.o. options reliably  ADAT

## 2024-09-15 LAB
ANION GAP SERPL CALC-SCNC: 9 MMOL/L (ref 8–16)
BASOPHILS # BLD AUTO: 0.02 K/UL (ref 0–0.2)
BASOPHILS NFR BLD: 0.4 % (ref 0–1.9)
BUN SERPL-MCNC: 4 MG/DL (ref 6–20)
C TRACH DNA SPEC QL NAA+PROBE: NOT DETECTED
CALCIUM SERPL-MCNC: 8.8 MG/DL (ref 8.7–10.5)
CHLORIDE SERPL-SCNC: 101 MMOL/L (ref 95–110)
CO2 SERPL-SCNC: 24 MMOL/L (ref 23–29)
CREAT SERPL-MCNC: 0.8 MG/DL (ref 0.5–1.4)
DIFFERENTIAL METHOD BLD: ABNORMAL
EOSINOPHIL # BLD AUTO: 0.2 K/UL (ref 0–0.5)
EOSINOPHIL NFR BLD: 4.5 % (ref 0–8)
ERYTHROCYTE [DISTWIDTH] IN BLOOD BY AUTOMATED COUNT: 18.2 % (ref 11.5–14.5)
EST. GFR  (NO RACE VARIABLE): >60 ML/MIN/1.73 M^2
GLUCOSE SERPL-MCNC: 95 MG/DL (ref 70–110)
HCT VFR BLD AUTO: 35.6 % (ref 40–54)
HGB BLD-MCNC: 10.7 G/DL (ref 14–18)
IMM GRANULOCYTES # BLD AUTO: 0.02 K/UL (ref 0–0.04)
IMM GRANULOCYTES NFR BLD AUTO: 0.4 % (ref 0–0.5)
LACTATE SERPL-SCNC: 0.7 MMOL/L (ref 0.5–2.2)
LYMPHOCYTES # BLD AUTO: 1.7 K/UL (ref 1–4.8)
LYMPHOCYTES NFR BLD: 35.4 % (ref 18–48)
MAGNESIUM SERPL-MCNC: 1.8 MG/DL (ref 1.6–2.6)
MCH RBC QN AUTO: 25.9 PG (ref 27–31)
MCHC RBC AUTO-ENTMCNC: 30.1 G/DL (ref 32–36)
MCV RBC AUTO: 86 FL (ref 82–98)
MONOCYTES # BLD AUTO: 0.4 K/UL (ref 0.3–1)
MONOCYTES NFR BLD: 8.8 % (ref 4–15)
N GONORRHOEA DNA SPEC QL NAA+PROBE: NOT DETECTED
NEUTROPHILS # BLD AUTO: 2.4 K/UL (ref 1.8–7.7)
NEUTROPHILS NFR BLD: 50.5 % (ref 38–73)
NRBC BLD-RTO: 0 /100 WBC
OHS QRS DURATION: 90 MS
OHS QTC CALCULATION: 420 MS
PHOSPHATE SERPL-MCNC: 5.5 MG/DL (ref 2.7–4.5)
PLATELET # BLD AUTO: 143 K/UL (ref 150–450)
PMV BLD AUTO: 10.7 FL (ref 9.2–12.9)
POTASSIUM SERPL-SCNC: 4 MMOL/L (ref 3.5–5.1)
RBC # BLD AUTO: 4.13 M/UL (ref 4.6–6.2)
SODIUM SERPL-SCNC: 134 MMOL/L (ref 136–145)
WBC # BLD AUTO: 4.66 K/UL (ref 3.9–12.7)

## 2024-09-15 PROCEDURE — 80048 BASIC METABOLIC PNL TOTAL CA: CPT | Performed by: STUDENT IN AN ORGANIZED HEALTH CARE EDUCATION/TRAINING PROGRAM

## 2024-09-15 PROCEDURE — 63600175 PHARM REV CODE 636 W HCPCS: Performed by: STUDENT IN AN ORGANIZED HEALTH CARE EDUCATION/TRAINING PROGRAM

## 2024-09-15 PROCEDURE — 11000001 HC ACUTE MED/SURG PRIVATE ROOM

## 2024-09-15 PROCEDURE — G0378 HOSPITAL OBSERVATION PER HR: HCPCS

## 2024-09-15 PROCEDURE — 83605 ASSAY OF LACTIC ACID: CPT | Performed by: STUDENT IN AN ORGANIZED HEALTH CARE EDUCATION/TRAINING PROGRAM

## 2024-09-15 PROCEDURE — 25000003 PHARM REV CODE 250: Performed by: STUDENT IN AN ORGANIZED HEALTH CARE EDUCATION/TRAINING PROGRAM

## 2024-09-15 PROCEDURE — 36415 COLL VENOUS BLD VENIPUNCTURE: CPT | Performed by: STUDENT IN AN ORGANIZED HEALTH CARE EDUCATION/TRAINING PROGRAM

## 2024-09-15 PROCEDURE — 85025 COMPLETE CBC W/AUTO DIFF WBC: CPT | Performed by: STUDENT IN AN ORGANIZED HEALTH CARE EDUCATION/TRAINING PROGRAM

## 2024-09-15 PROCEDURE — 83735 ASSAY OF MAGNESIUM: CPT | Performed by: STUDENT IN AN ORGANIZED HEALTH CARE EDUCATION/TRAINING PROGRAM

## 2024-09-15 PROCEDURE — 84100 ASSAY OF PHOSPHORUS: CPT | Performed by: STUDENT IN AN ORGANIZED HEALTH CARE EDUCATION/TRAINING PROGRAM

## 2024-09-15 RX ORDER — LACTULOSE 10 G/15ML
30 SOLUTION ORAL EVERY 6 HOURS PRN
Status: DISCONTINUED | OUTPATIENT
Start: 2024-09-15 | End: 2024-09-21 | Stop reason: HOSPADM

## 2024-09-15 RX ORDER — HYDROMORPHONE HYDROCHLORIDE 2 MG/ML
2 INJECTION, SOLUTION INTRAMUSCULAR; INTRAVENOUS; SUBCUTANEOUS
Status: DISCONTINUED | OUTPATIENT
Start: 2024-09-15 | End: 2024-09-17

## 2024-09-15 RX ORDER — AMOXICILLIN 250 MG
1 CAPSULE ORAL 2 TIMES DAILY PRN
Status: DISCONTINUED | OUTPATIENT
Start: 2024-09-15 | End: 2024-09-21 | Stop reason: HOSPADM

## 2024-09-15 RX ORDER — SODIUM CHLORIDE, SODIUM LACTATE, POTASSIUM CHLORIDE, CALCIUM CHLORIDE 600; 310; 30; 20 MG/100ML; MG/100ML; MG/100ML; MG/100ML
INJECTION, SOLUTION INTRAVENOUS CONTINUOUS
Status: ACTIVE | OUTPATIENT
Start: 2024-09-15 | End: 2024-09-17

## 2024-09-15 RX ADMIN — HYDROMORPHONE HYDROCHLORIDE 2 MG: 2 INJECTION, SOLUTION INTRAMUSCULAR; INTRAVENOUS; SUBCUTANEOUS at 10:09

## 2024-09-15 RX ADMIN — PROMETHAZINE HYDROCHLORIDE 6.25 MG: 25 INJECTION INTRAMUSCULAR; INTRAVENOUS at 06:09

## 2024-09-15 RX ADMIN — PROMETHAZINE HYDROCHLORIDE 6.25 MG: 25 INJECTION INTRAMUSCULAR; INTRAVENOUS at 12:09

## 2024-09-15 RX ADMIN — PROMETHAZINE HYDROCHLORIDE 6.25 MG: 25 INJECTION INTRAMUSCULAR; INTRAVENOUS at 07:09

## 2024-09-15 RX ADMIN — PROCHLORPERAZINE EDISYLATE 5 MG: 5 INJECTION INTRAMUSCULAR; INTRAVENOUS at 10:09

## 2024-09-15 RX ADMIN — BICTEGRAVIR SODIUM, EMTRICITABINE, AND TENOFOVIR ALAFENAMIDE FUMARATE 1 TABLET: 50; 200; 25 TABLET ORAL at 08:09

## 2024-09-15 RX ADMIN — HYDROMORPHONE HYDROCHLORIDE 2 MG: 2 INJECTION, SOLUTION INTRAMUSCULAR; INTRAVENOUS; SUBCUTANEOUS at 03:09

## 2024-09-15 RX ADMIN — HYDROMORPHONE HYDROCHLORIDE 1.5 MG: 2 INJECTION, SOLUTION INTRAMUSCULAR; INTRAVENOUS; SUBCUTANEOUS at 03:09

## 2024-09-15 RX ADMIN — SODIUM CHLORIDE, SODIUM LACTATE, POTASSIUM CHLORIDE, AND CALCIUM CHLORIDE: 600; 310; 30; 20 INJECTION, SOLUTION INTRAVENOUS at 04:09

## 2024-09-15 RX ADMIN — HYDROMORPHONE HYDROCHLORIDE 1.5 MG: 2 INJECTION, SOLUTION INTRAMUSCULAR; INTRAVENOUS; SUBCUTANEOUS at 11:09

## 2024-09-15 RX ADMIN — HYDROMORPHONE HYDROCHLORIDE 1.5 MG: 2 INJECTION, SOLUTION INTRAMUSCULAR; INTRAVENOUS; SUBCUTANEOUS at 12:09

## 2024-09-15 RX ADMIN — HYDROMORPHONE HYDROCHLORIDE 1.5 MG: 2 INJECTION, SOLUTION INTRAMUSCULAR; INTRAVENOUS; SUBCUTANEOUS at 06:09

## 2024-09-15 RX ADMIN — LACTULOSE 30 G: 20 SOLUTION ORAL at 08:09

## 2024-09-15 RX ADMIN — HYDROMORPHONE HYDROCHLORIDE 2 MG: 2 INJECTION, SOLUTION INTRAMUSCULAR; INTRAVENOUS; SUBCUTANEOUS at 07:09

## 2024-09-15 NOTE — PLAN OF CARE
Patient is AAOX4. He will have a liver ultrasound tonight. Patient rather dilaudid than the oxycodone. He is in no distrerss. No complaints.

## 2024-09-15 NOTE — PROGRESS NOTES
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  Notes feeling in his usual state of health yesterday.  However, this morning around 4:00 a.m. he was awoken by a sharp, stabbing abdominal pain.  He notes the abdominal pain is mostly in his left upper quadrant; radiated a little bit to the center and lower left quadrant.  It was so severe it caused him to have nausea and vomit.  He was concerned about his emesis as it was brown appearing. He reports no hematemesis, melena, BRBPR.  The abdominal pain is pretty much constant. He denies any fever or chills preceding the abdominal pain, however had some chills after a bout of vomiting.  He was unable to tolerate a bagel this morning.  He has been tolerating small sips of water in the emergency department since arriving this morning around 11.  In terms of diarrhea he reported to the ED, he notes that he had several very small watery bowel movements yesterday; his last good bowel movement was about a week ago.    Of note, patient was recently admitted to Hospital Medicine 7/23-7/29 with similar symptoms. 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  He was evaluated by IR; his collection was deemed too deep and surrounded by too many varices to afford good percutaneous window for drainage.  AES was then consulted; fluid collection was deemed too complex to be drained.  A CTA however was ordered to evaluate for any component of hemorrhagic pancreatitis; CTA was without signs of hemorrhagic pancreatitis.    Today, on admission, potassium mildly low at 3.4, lipase within normal limits, CT abdomen pelvis with IV contrast notable for stable fluid collection along the superior aspect of pancreas suggesting pseudocyst without surrounding inflammation to suggest interval infection; no convincing peripancreatic inflammation.  Early enteritis or developing right colonic constipation was also noted.    Overview/Hospital Course:  Admitted to hospital medicine for management of acute on chronic pancreatitis. Initiated on IV anti-emetics, IV pain control, and IVF.     Interval History:  Patient seen and examined at bedside.    Continued abdominal pain, nausea, 1 episode of emesis overnight.  Describes emesis as brown, however not coffee-ground. Having good bowel movement. In terms of abdo pain, notes uncontrolled on current regimen.  Patient updated regarding care plan.      Review of Systems   Constitutional:  Negative for fever.   HENT:  Negative for congestion, sore throat and trouble swallowing.    Eyes:  Negative for visual disturbance.   Respiratory:  Negative for cough, chest tightness and shortness of breath.    Cardiovascular:  Negative for chest pain, palpitations and leg swelling.   Gastrointestinal:  Positive for abdominal pain, constipation, nausea and vomiting.   Genitourinary:  Negative for dysuria.   Musculoskeletal:  Negative for arthralgias and myalgias.   Skin:  Negative for rash.   Neurological:  Negative for dizziness and light-headedness.    Psychiatric/Behavioral:  Negative for confusion and decreased concentration.        Objective:    Temp: 98.6 °F (37 °C) (09/15/24 1126)  Pulse: 98 (09/15/24 1126)  Resp: 18 (09/15/24 1126)  BP: 138/85 (09/15/24 1126)  SpO2: (!) 93 % (09/15/24 1126)    Weight: 65.8 kg (145 lb) (09/13/24 1045)    Body mass index is 20.81 kg/m².      Intake/Output Summary (Last 24 hours) at 9/15/2024 1334  Last data filed at 9/15/2024 0946  Gross per 24 hour   Intake 720 ml   Output --   Net 720 ml       Physical Exam  Vitals and nursing note reviewed.   Constitutional:       General: He is not in acute distress.     Appearance: He is not ill-appearing, toxic-appearing or diaphoretic.   HENT:      Head: Normocephalic and atraumatic.      Nose: Nose normal.      Mouth/Throat:      Mouth: Mucous membranes are dry.      Pharynx: Oropharynx is clear.   Eyes:      General: No scleral icterus.  Cardiovascular:      Rate and Rhythm: Normal rate and regular rhythm.      Pulses: Normal pulses.      Heart sounds: Normal heart sounds.   Pulmonary:      Effort: Pulmonary effort is normal.      Breath sounds: Normal breath sounds.   Abdominal:      General: There is distension.      Tenderness: There is abdominal tenderness in the epigastric area, left upper quadrant and left lower quadrant. There is no guarding or rebound.   Musculoskeletal:      Cervical back: Normal range of motion and neck supple.      Right lower leg: No edema.      Left lower leg: No edema.   Skin:     General: Skin is warm and dry.      Capillary Refill: Capillary refill takes less than 2 seconds.   Neurological:      Mental Status: He is alert and oriented to person, place, and time.   Psychiatric:         Behavior: Behavior normal.         Significant Labs: All pertinent labs within the past 24 hours have been reviewed.    Recent Results (from the past 24 hour(s))   Basic metabolic panel    Collection Time: 09/15/24  6:32 AM   Result Value Ref Range    Sodium 134 (L) 136  - 145 mmol/L    Potassium 4.0 3.5 - 5.1 mmol/L    Chloride 101 95 - 110 mmol/L    CO2 24 23 - 29 mmol/L    Glucose 95 70 - 110 mg/dL    BUN 4 (L) 6 - 20 mg/dL    Creatinine 0.8 0.5 - 1.4 mg/dL    Calcium 8.8 8.7 - 10.5 mg/dL    Anion Gap 9 8 - 16 mmol/L    eGFR >60.0 >60 mL/min/1.73 m^2   Magnesium    Collection Time: 09/15/24  6:32 AM   Result Value Ref Range    Magnesium 1.8 1.6 - 2.6 mg/dL   Phosphorus    Collection Time: 09/15/24  6:32 AM   Result Value Ref Range    Phosphorus 5.5 (H) 2.7 - 4.5 mg/dL   CBC auto differential    Collection Time: 09/15/24  6:32 AM   Result Value Ref Range    WBC 4.66 3.90 - 12.70 K/uL    RBC 4.13 (L) 4.60 - 6.20 M/uL    Hemoglobin 10.7 (L) 14.0 - 18.0 g/dL    Hematocrit 35.6 (L) 40.0 - 54.0 %    MCV 86 82 - 98 fL    MCH 25.9 (L) 27.0 - 31.0 pg    MCHC 30.1 (L) 32.0 - 36.0 g/dL    RDW 18.2 (H) 11.5 - 14.5 %    Platelets 143 (L) 150 - 450 K/uL    MPV 10.7 9.2 - 12.9 fL    Immature Granulocytes 0.4 0.0 - 0.5 %    Gran # (ANC) 2.4 1.8 - 7.7 K/uL    Immature Grans (Abs) 0.02 0.00 - 0.04 K/uL    Lymph # 1.7 1.0 - 4.8 K/uL    Mono # 0.4 0.3 - 1.0 K/uL    Eos # 0.2 0.0 - 0.5 K/uL    Baso # 0.02 0.00 - 0.20 K/uL    nRBC 0 0 /100 WBC    Gran % 50.5 38.0 - 73.0 %    Lymph % 35.4 18.0 - 48.0 %    Mono % 8.8 4.0 - 15.0 %    Eosinophil % 4.5 0.0 - 8.0 %    Basophil % 0.4 0.0 - 1.9 %    Differential Method Automated        Significant Imaging: I have reviewed all pertinent imaging results/findings within the past 24 hours.    Imaging Results              CT Abdomen Pelvis With IV Contrast NO Oral Contrast (Final result)  Result time 09/13/24 14:32:21      Final result by Juan Sexton MD (09/13/24 14:32:21)                   Impression:      1. Low attenuating fluid collection along the superior aspect of the pancreas suggests pseudo cyst, similar in appearance to the previous exam.  No surrounding inflammation to suggest interval infection.  No convincing peripancreatic inflammation.   Correlation with laboratory values is advised as CT findings of acute pancreatitis lag behind laboratory abnormality.  2. Findings suggest hepatic steatosis noting hepatomegaly, correlation with LFTs recommended.  3. There are a few scattered fluid-filled small bowel loops, nonspecific.  This may be on the basis of developing right colonic constipation however early enteritis remains a consideration.  Correlation is advised.  4. Please see above for several additional findings.      Electronically signed by: Juan Sexton MD  Date:    09/13/2024  Time:    14:32               Narrative:    EXAMINATION:  CT ABDOMEN PELVIS WITH IV CONTRAST    CLINICAL HISTORY:  Pancreatitis, acute, severe;    TECHNIQUE:  Low dose axial images, sagittal and coronal reformations were obtained from the lung bases to the pubic symphysis following the IV administration of 75 mL of Omnipaque 350 .  Oral contrast was not given.    COMPARISON:  07/24/2024    FINDINGS:  Images of the lower thorax are unremarkable.    The liver is diffusely hypoattenuating suggesting steatosis, correlation with LFTs recommended.  The liver is enlarged.  The spleen, adrenal glands and gallbladder are unremarkable.  The stomach is relatively decompressed, no gastric wall thickening.  The pancreas enhances homogeneously without pancreatic ductal dilation.  There is a lobular fluid collection superior to the pancreas, similar to the previous examination.  On coronal imaging this measures 6.1 x 3.6 cm suggesting pseudo cyst.  No interval wall thickening or inflammation about the collection to suggest infection.  The portal vein, SMV, celiac axis and SMA all are patent.  There is some irregularity involving the splenic vein noting a few scattered perigastric collaterals.  No significant abdominal lymphadenopathy.    The kidneys enhance symmetrically without hydronephrosis or nephrolithiasis.  The bilateral ureters are unremarkable without calculi seen.  The urinary  bladder is nondistended noting there may be some residual wall thickening.  The prostate is not enlarged.    The large bowel is grossly unremarkable noting moderate stool in the right colon.  The terminal ileum and appendix are unremarkable.  There are a few scattered fluid-filled small bowel loops.  There are a few scattered shotty periaortic, pericaval, and mesenteric lymph nodes.  No focal organized pelvic fluid collection.    No acute osseous abnormalities.  No significant inguinal lymphadenopathy.                                        Assessment/Plan:      * Acute on chronic pancreatitis  Nausea and vomiting  Abdominal pain  Fluid collection of pancreas    Lipase 60  Lipid panel added; previously without hypertriglyceridemia - TG 53  Unclear etiology; notably, with PV and history of thrombus, always a consideration however CTA last admission unremarkable  CT scan on admission with stable fluid collection of pancreas; no convincing peripancreatic inflammation or surrounding inflammation to suggest interval infection  Continue aggressive IVF  Schedule IV antiemetics today  Pain control - IV until able to tolerate p.o. options reliably  ADAT    Constipation  Concern for overflow diarrhea  Address with bowel regimen; may be contributing to abdominal pain, nausea, vomiting      Hypokalemia  Patient's most recent potassium results are listed below.   Recent Labs     09/13/24  1238 09/14/24  0337 09/15/24  0632   K 3.4* 3.4* 4.0       Plan  - Replete potassium per protocol  - Monitor potassium Daily  - Patient's hypokalemia is improving      VTE Risk Mitigation (From admission, onward)           Ordered     heparin (porcine) injection 5,000 Units  Every 8 hours         09/13/24 1654     IP VTE HIGH RISK PATIENT  Once         09/13/24 1654     Place sequential compression device  Until discontinued         09/13/24 1654                    Discharge Planning   CASTILLO: 9/16/2024     Code Status: Full Code   Is the patient  medically ready for discharge?:     Reason for patient still in hospital (select all that apply): Patient trending condition, Treatment, and Pending disposition  Discharge Plan A: Home                  Stephanie Xavier MD  Department of Hospital Medicine   [facility] - Internal Medicine Telemetry

## 2024-09-15 NOTE — SUBJECTIVE & OBJECTIVE
Interval History:  Patient seen and examined at bedside.    Continued abdominal pain, nausea, 1 episode of emesis overnight.  Describes emesis as brown, however not coffee-ground. Having good bowel movement. In terms of abdo pain, notes uncontrolled on current regimen.  Patient updated regarding care plan.      Review of Systems   Constitutional:  Negative for fever.   HENT:  Negative for congestion, sore throat and trouble swallowing.    Eyes:  Negative for visual disturbance.   Respiratory:  Negative for cough, chest tightness and shortness of breath.    Cardiovascular:  Negative for chest pain, palpitations and leg swelling.   Gastrointestinal:  Positive for abdominal pain, constipation, nausea and vomiting.   Genitourinary:  Negative for dysuria.   Musculoskeletal:  Negative for arthralgias and myalgias.   Skin:  Negative for rash.   Neurological:  Negative for dizziness and light-headedness.   Psychiatric/Behavioral:  Negative for confusion and decreased concentration.        Objective:    Temp: 98.6 °F (37 °C) (09/15/24 1126)  Pulse: 98 (09/15/24 1126)  Resp: 18 (09/15/24 1126)  BP: 138/85 (09/15/24 1126)  SpO2: (!) 93 % (09/15/24 1126)    Weight: 65.8 kg (145 lb) (09/13/24 1045)    Body mass index is 20.81 kg/m².      Intake/Output Summary (Last 24 hours) at 9/15/2024 1334  Last data filed at 9/15/2024 0946  Gross per 24 hour   Intake 720 ml   Output --   Net 720 ml       Physical Exam  Vitals and nursing note reviewed.   Constitutional:       General: He is not in acute distress.     Appearance: He is not ill-appearing, toxic-appearing or diaphoretic.   HENT:      Head: Normocephalic and atraumatic.      Nose: Nose normal.      Mouth/Throat:      Mouth: Mucous membranes are dry.      Pharynx: Oropharynx is clear.   Eyes:      General: No scleral icterus.  Cardiovascular:      Rate and Rhythm: Normal rate and regular rhythm.      Pulses: Normal pulses.      Heart sounds: Normal heart sounds.   Pulmonary:       Effort: Pulmonary effort is normal.      Breath sounds: Normal breath sounds.   Abdominal:      General: There is distension.      Tenderness: There is abdominal tenderness in the epigastric area, left upper quadrant and left lower quadrant. There is no guarding or rebound.   Musculoskeletal:      Cervical back: Normal range of motion and neck supple.      Right lower leg: No edema.      Left lower leg: No edema.   Skin:     General: Skin is warm and dry.      Capillary Refill: Capillary refill takes less than 2 seconds.   Neurological:      Mental Status: He is alert and oriented to person, place, and time.   Psychiatric:         Behavior: Behavior normal.         Significant Labs: All pertinent labs within the past 24 hours have been reviewed.    Recent Results (from the past 24 hour(s))   Basic metabolic panel    Collection Time: 09/15/24  6:32 AM   Result Value Ref Range    Sodium 134 (L) 136 - 145 mmol/L    Potassium 4.0 3.5 - 5.1 mmol/L    Chloride 101 95 - 110 mmol/L    CO2 24 23 - 29 mmol/L    Glucose 95 70 - 110 mg/dL    BUN 4 (L) 6 - 20 mg/dL    Creatinine 0.8 0.5 - 1.4 mg/dL    Calcium 8.8 8.7 - 10.5 mg/dL    Anion Gap 9 8 - 16 mmol/L    eGFR >60.0 >60 mL/min/1.73 m^2   Magnesium    Collection Time: 09/15/24  6:32 AM   Result Value Ref Range    Magnesium 1.8 1.6 - 2.6 mg/dL   Phosphorus    Collection Time: 09/15/24  6:32 AM   Result Value Ref Range    Phosphorus 5.5 (H) 2.7 - 4.5 mg/dL   CBC auto differential    Collection Time: 09/15/24  6:32 AM   Result Value Ref Range    WBC 4.66 3.90 - 12.70 K/uL    RBC 4.13 (L) 4.60 - 6.20 M/uL    Hemoglobin 10.7 (L) 14.0 - 18.0 g/dL    Hematocrit 35.6 (L) 40.0 - 54.0 %    MCV 86 82 - 98 fL    MCH 25.9 (L) 27.0 - 31.0 pg    MCHC 30.1 (L) 32.0 - 36.0 g/dL    RDW 18.2 (H) 11.5 - 14.5 %    Platelets 143 (L) 150 - 450 K/uL    MPV 10.7 9.2 - 12.9 fL    Immature Granulocytes 0.4 0.0 - 0.5 %    Gran # (ANC) 2.4 1.8 - 7.7 K/uL    Immature Grans (Abs) 0.02 0.00 - 0.04 K/uL     Lymph # 1.7 1.0 - 4.8 K/uL    Mono # 0.4 0.3 - 1.0 K/uL    Eos # 0.2 0.0 - 0.5 K/uL    Baso # 0.02 0.00 - 0.20 K/uL    nRBC 0 0 /100 WBC    Gran % 50.5 38.0 - 73.0 %    Lymph % 35.4 18.0 - 48.0 %    Mono % 8.8 4.0 - 15.0 %    Eosinophil % 4.5 0.0 - 8.0 %    Basophil % 0.4 0.0 - 1.9 %    Differential Method Automated        Significant Imaging: I have reviewed all pertinent imaging results/findings within the past 24 hours.    Imaging Results              CT Abdomen Pelvis With IV Contrast NO Oral Contrast (Final result)  Result time 09/13/24 14:32:21      Final result by Juan Sexton MD (09/13/24 14:32:21)                   Impression:      1. Low attenuating fluid collection along the superior aspect of the pancreas suggests pseudo cyst, similar in appearance to the previous exam.  No surrounding inflammation to suggest interval infection.  No convincing peripancreatic inflammation.  Correlation with laboratory values is advised as CT findings of acute pancreatitis lag behind laboratory abnormality.  2. Findings suggest hepatic steatosis noting hepatomegaly, correlation with LFTs recommended.  3. There are a few scattered fluid-filled small bowel loops, nonspecific.  This may be on the basis of developing right colonic constipation however early enteritis remains a consideration.  Correlation is advised.  4. Please see above for several additional findings.      Electronically signed by: Juan Sexton MD  Date:    09/13/2024  Time:    14:32               Narrative:    EXAMINATION:  CT ABDOMEN PELVIS WITH IV CONTRAST    CLINICAL HISTORY:  Pancreatitis, acute, severe;    TECHNIQUE:  Low dose axial images, sagittal and coronal reformations were obtained from the lung bases to the pubic symphysis following the IV administration of 75 mL of Omnipaque 350 .  Oral contrast was not given.    COMPARISON:  07/24/2024    FINDINGS:  Images of the lower thorax are unremarkable.    The liver is diffusely hypoattenuating  suggesting steatosis, correlation with LFTs recommended.  The liver is enlarged.  The spleen, adrenal glands and gallbladder are unremarkable.  The stomach is relatively decompressed, no gastric wall thickening.  The pancreas enhances homogeneously without pancreatic ductal dilation.  There is a lobular fluid collection superior to the pancreas, similar to the previous examination.  On coronal imaging this measures 6.1 x 3.6 cm suggesting pseudo cyst.  No interval wall thickening or inflammation about the collection to suggest infection.  The portal vein, SMV, celiac axis and SMA all are patent.  There is some irregularity involving the splenic vein noting a few scattered perigastric collaterals.  No significant abdominal lymphadenopathy.    The kidneys enhance symmetrically without hydronephrosis or nephrolithiasis.  The bilateral ureters are unremarkable without calculi seen.  The urinary bladder is nondistended noting there may be some residual wall thickening.  The prostate is not enlarged.    The large bowel is grossly unremarkable noting moderate stool in the right colon.  The terminal ileum and appendix are unremarkable.  There are a few scattered fluid-filled small bowel loops.  There are a few scattered shotty periaortic, pericaval, and mesenteric lymph nodes.  No focal organized pelvic fluid collection.    No acute osseous abnormalities.  No significant inguinal lymphadenopathy.

## 2024-09-15 NOTE — ASSESSMENT & PLAN NOTE
Patient's most recent potassium results are listed below.   Recent Labs     09/13/24  1238 09/14/24  0337 09/15/24  0632   K 3.4* 3.4* 4.0       Plan  - Replete potassium per protocol  - Monitor potassium Daily  - Patient's hypokalemia is improving

## 2024-09-15 NOTE — PLAN OF CARE
Problem: Adult Inpatient Plan of Care  Goal: Plan of Care Review  Outcome: Progressing  Goal: Patient-Specific Goal (Individualized)  Outcome: Progressing  Goal: Absence of Hospital-Acquired Illness or Injury  Outcome: Progressing  Goal: Optimal Comfort and Wellbeing  Outcome: Progressing  Goal: Readiness for Transition of Care  Outcome: Progressing   Alert and orient times 4. Pt request pain medicine IV each time it is due. He refuses oral oxy stated he doesn't work. Patient informed med is ordered to give oral and IV dilaudid is for breath thru paun . Pt. Informed me that he has not been taking medicine this way. Since this is my first time caring for this patient I reach out to on call MD, Dr Osborne and asked if OK to give medicine as patient desires. MD state it is OK. Complain of level 7-9 abdominal pain . Pt is also recieving schedule phenergan  IVPB. No fall

## 2024-09-16 LAB
AMPHETAMINES SERPL QL: NEGATIVE
ANION GAP SERPL CALC-SCNC: 8 MMOL/L (ref 8–16)
BARBITURATES SERPL QL SCN: NEGATIVE
BASOPHILS # BLD AUTO: 0.03 K/UL (ref 0–0.2)
BASOPHILS NFR BLD: 0.6 % (ref 0–1.9)
BENZODIAZ SERPL QL SCN: NEGATIVE
BUN SERPL-MCNC: 5 MG/DL (ref 6–20)
BZE SERPL QL: NEGATIVE
CALCIUM SERPL-MCNC: 8.7 MG/DL (ref 8.7–10.5)
CARBOXYTHC SERPL QL SCN: POSITIVE
CHLORIDE SERPL-SCNC: 99 MMOL/L (ref 95–110)
CO2 SERPL-SCNC: 26 MMOL/L (ref 23–29)
CREAT SERPL-MCNC: 0.8 MG/DL (ref 0.5–1.4)
DIFFERENTIAL METHOD BLD: ABNORMAL
EOSINOPHIL # BLD AUTO: 0.2 K/UL (ref 0–0.5)
EOSINOPHIL NFR BLD: 4.7 % (ref 0–8)
ERYTHROCYTE [DISTWIDTH] IN BLOOD BY AUTOMATED COUNT: 18.4 % (ref 11.5–14.5)
EST. GFR  (NO RACE VARIABLE): >60 ML/MIN/1.73 M^2
ETHANOL SERPL QL SCN: NEGATIVE
GLUCOSE SERPL-MCNC: 95 MG/DL (ref 70–110)
HCT VFR BLD AUTO: 36.3 % (ref 40–54)
HGB BLD-MCNC: 10.8 G/DL (ref 14–18)
IMM GRANULOCYTES # BLD AUTO: 0.01 K/UL (ref 0–0.04)
IMM GRANULOCYTES NFR BLD AUTO: 0.2 % (ref 0–0.5)
LYMPHOCYTES # BLD AUTO: 1.7 K/UL (ref 1–4.8)
LYMPHOCYTES NFR BLD: 35 % (ref 18–48)
MAGNESIUM SERPL-MCNC: 1.8 MG/DL (ref 1.6–2.6)
MCH RBC QN AUTO: 25.5 PG (ref 27–31)
MCHC RBC AUTO-ENTMCNC: 29.8 G/DL (ref 32–36)
MCV RBC AUTO: 86 FL (ref 82–98)
METHADONE SERPL QL SCN: NEGATIVE
MONOCYTES # BLD AUTO: 0.5 K/UL (ref 0.3–1)
MONOCYTES NFR BLD: 9.5 % (ref 4–15)
NEUTROPHILS # BLD AUTO: 2.5 K/UL (ref 1.8–7.7)
NEUTROPHILS NFR BLD: 50 % (ref 38–73)
NRBC BLD-RTO: 0 /100 WBC
OPIATES SERPL QL SCN: NEGATIVE
PCP SERPL QL SCN: NEGATIVE
PHOSPHATE SERPL-MCNC: 4.8 MG/DL (ref 2.7–4.5)
PLATELET # BLD AUTO: 156 K/UL (ref 150–450)
PMV BLD AUTO: 10.6 FL (ref 9.2–12.9)
POTASSIUM SERPL-SCNC: 3.6 MMOL/L (ref 3.5–5.1)
PROPOXYPH SERPL QL: NEGATIVE
RBC # BLD AUTO: 4.24 M/UL (ref 4.6–6.2)
SODIUM SERPL-SCNC: 133 MMOL/L (ref 136–145)
WBC # BLD AUTO: 4.94 K/UL (ref 3.9–12.7)

## 2024-09-16 PROCEDURE — 84100 ASSAY OF PHOSPHORUS: CPT | Performed by: STUDENT IN AN ORGANIZED HEALTH CARE EDUCATION/TRAINING PROGRAM

## 2024-09-16 PROCEDURE — 85025 COMPLETE CBC W/AUTO DIFF WBC: CPT | Performed by: STUDENT IN AN ORGANIZED HEALTH CARE EDUCATION/TRAINING PROGRAM

## 2024-09-16 PROCEDURE — 80048 BASIC METABOLIC PNL TOTAL CA: CPT | Performed by: STUDENT IN AN ORGANIZED HEALTH CARE EDUCATION/TRAINING PROGRAM

## 2024-09-16 PROCEDURE — 25000003 PHARM REV CODE 250: Performed by: STUDENT IN AN ORGANIZED HEALTH CARE EDUCATION/TRAINING PROGRAM

## 2024-09-16 PROCEDURE — 63600175 PHARM REV CODE 636 W HCPCS: Performed by: STUDENT IN AN ORGANIZED HEALTH CARE EDUCATION/TRAINING PROGRAM

## 2024-09-16 PROCEDURE — 36415 COLL VENOUS BLD VENIPUNCTURE: CPT | Performed by: STUDENT IN AN ORGANIZED HEALTH CARE EDUCATION/TRAINING PROGRAM

## 2024-09-16 PROCEDURE — 11000001 HC ACUTE MED/SURG PRIVATE ROOM

## 2024-09-16 PROCEDURE — 83735 ASSAY OF MAGNESIUM: CPT | Performed by: STUDENT IN AN ORGANIZED HEALTH CARE EDUCATION/TRAINING PROGRAM

## 2024-09-16 RX ADMIN — PROMETHAZINE HYDROCHLORIDE 6.25 MG: 25 INJECTION INTRAMUSCULAR; INTRAVENOUS at 05:09

## 2024-09-16 RX ADMIN — PROCHLORPERAZINE EDISYLATE 5 MG: 5 INJECTION INTRAMUSCULAR; INTRAVENOUS at 05:09

## 2024-09-16 RX ADMIN — HYDROMORPHONE HYDROCHLORIDE 2 MG: 2 INJECTION, SOLUTION INTRAMUSCULAR; INTRAVENOUS; SUBCUTANEOUS at 12:09

## 2024-09-16 RX ADMIN — SODIUM CHLORIDE, SODIUM LACTATE, POTASSIUM CHLORIDE, AND CALCIUM CHLORIDE: 600; 310; 30; 20 INJECTION, SOLUTION INTRAVENOUS at 05:09

## 2024-09-16 RX ADMIN — LACTULOSE 30 G: 20 SOLUTION ORAL at 10:09

## 2024-09-16 RX ADMIN — HYDROMORPHONE HYDROCHLORIDE 2 MG: 2 INJECTION, SOLUTION INTRAMUSCULAR; INTRAVENOUS; SUBCUTANEOUS at 03:09

## 2024-09-16 RX ADMIN — HYDROMORPHONE HYDROCHLORIDE 2 MG: 2 INJECTION, SOLUTION INTRAMUSCULAR; INTRAVENOUS; SUBCUTANEOUS at 04:09

## 2024-09-16 RX ADMIN — PROCHLORPERAZINE EDISYLATE 5 MG: 5 INJECTION INTRAMUSCULAR; INTRAVENOUS at 12:09

## 2024-09-16 RX ADMIN — HYDROMORPHONE HYDROCHLORIDE 2 MG: 2 INJECTION, SOLUTION INTRAMUSCULAR; INTRAVENOUS; SUBCUTANEOUS at 01:09

## 2024-09-16 RX ADMIN — HYDROMORPHONE HYDROCHLORIDE 2 MG: 2 INJECTION, SOLUTION INTRAMUSCULAR; INTRAVENOUS; SUBCUTANEOUS at 09:09

## 2024-09-16 RX ADMIN — BICTEGRAVIR SODIUM, EMTRICITABINE, AND TENOFOVIR ALAFENAMIDE FUMARATE 1 TABLET: 50; 200; 25 TABLET ORAL at 10:09

## 2024-09-16 RX ADMIN — SODIUM CHLORIDE, SODIUM LACTATE, POTASSIUM CHLORIDE, AND CALCIUM CHLORIDE: 600; 310; 30; 20 INJECTION, SOLUTION INTRAVENOUS at 06:09

## 2024-09-16 RX ADMIN — HYDROMORPHONE HYDROCHLORIDE 2 MG: 2 INJECTION, SOLUTION INTRAMUSCULAR; INTRAVENOUS; SUBCUTANEOUS at 05:09

## 2024-09-16 RX ADMIN — HYDROMORPHONE HYDROCHLORIDE 2 MG: 2 INJECTION, SOLUTION INTRAMUSCULAR; INTRAVENOUS; SUBCUTANEOUS at 08:09

## 2024-09-16 NOTE — ASSESSMENT & PLAN NOTE
Patient's most recent potassium results are listed below.   Recent Labs     09/14/24  0337 09/15/24  0632 09/16/24  0220   K 3.4* 4.0 3.6       Plan  - Replete potassium per protocol  - Monitor potassium Daily  - Patient's hypokalemia is worsening. Will continue current treatment

## 2024-09-16 NOTE — NURSING
Patient off unit for scheduled testing. Patient in NAD with minimal pain noted to abdomen and to be treated when able.

## 2024-09-16 NOTE — NURSING
Patient arrived back to the unit via stretcher. Patient in NAD with complaints of pain being treated. IVF reconnected and infusing. Patient remains stable and is continually being monitored.

## 2024-09-16 NOTE — ASSESSMENT & PLAN NOTE
Nausea and vomiting  Abdominal pain  Fluid collection of pancreas    Lipase 60  Lipid panel added; previously without hypertriglyceridemia - TG 53  Unclear etiology; notably, with PV and history of thrombus, always a consideration; of note, CTA last admission unremarkable  Liver ultrasound with Doppler without obvious thrombus visualized  CT scan on admission with stable fluid collection of pancreas; no convincing peripancreatic inflammation or surrounding inflammation to suggest interval infection  Continue aggressive IVF  Continue IV antiemetics prn  Pain control - continuing IV Dilaudid until able to tolerate p.o. options reliably from intake and pain control perspective  Advanced to low saturated fat and low cholesterol diet

## 2024-09-16 NOTE — PROGRESS NOTES
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  Notes feeling in his usual state of health yesterday.  However, this morning around 4:00 a.m. he was awoken by a sharp, stabbing abdominal pain.  He notes the abdominal pain is mostly in his left upper quadrant; radiated a little bit to the center and lower left quadrant.  It was so severe it caused him to have nausea and vomit.  He was concerned about his emesis as it was brown appearing. He reports no hematemesis, melena, BRBPR.  The abdominal pain is pretty much constant. He denies any fever or chills preceding the abdominal pain, however had some chills after a bout of vomiting.  He was unable to tolerate a bagel this morning.  He has been tolerating small sips of water in the emergency department since arriving this morning around 11.  In terms of diarrhea he reported to the ED, he notes that he had several very small watery bowel movements yesterday; his last good bowel movement was about a week ago.    Of note, patient was recently admitted to Hospital Medicine 7/23-7/29 with similar symptoms. 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  He was evaluated by IR; his collection was deemed too deep and surrounded by too many varices to afford good percutaneous window for drainage.  AES was then consulted; fluid collection was deemed too complex to be drained.  A CTA however was ordered to evaluate for any component of hemorrhagic pancreatitis; CTA was without signs of hemorrhagic pancreatitis.    Today, on admission, potassium mildly low at 3.4, lipase within normal limits, CT abdomen pelvis with IV contrast notable for stable fluid collection along the superior aspect of pancreas suggesting pseudocyst without surrounding inflammation to suggest interval infection; no convincing peripancreatic inflammation.  Early enteritis or developing right colonic constipation was also noted.    Overview/Hospital Course:  Admitted to hospital medicine for management of acute on chronic pancreatitis. Initiated on IV anti-emetics, IV pain control, and IVF.  Due to ongoing, severe pain, ultrasound liver with Doppler ordered to evaluate abdominal venous vasculature.  No obvious venous thrombus visualized.  Lactic acid also within normal limits.    Interval History:  Patient seen and examined at bedside.    Continued abdominal pain, nausea, emesis.  Emesis is brown, not coffee-ground.   Last BM yesterday.   Patient updated regarding care plan.      Review of Systems   Constitutional:  Negative for fever.   HENT:  Negative for congestion, sore throat and trouble swallowing.    Eyes:  Negative for visual disturbance.   Respiratory:  Negative for cough, chest tightness and shortness of breath.    Cardiovascular:  Negative for chest pain, palpitations and leg swelling.   Gastrointestinal:  Positive for abdominal pain, constipation, nausea and vomiting.   Genitourinary:  Negative for dysuria.   Musculoskeletal:  Negative for arthralgias and myalgias.   Skin:   Negative for rash.   Neurological:  Negative for dizziness and light-headedness.   Psychiatric/Behavioral:  Negative for confusion and decreased concentration.        Objective:    Temp: 98.1 °F (36.7 °C) (09/16/24 1231)  Pulse: 76 (09/16/24 1231)  Resp: 19 (09/16/24 1231)  BP: 114/74 (09/16/24 1231)  SpO2: 95 % (09/16/24 1231)    Weight: 65.8 kg (145 lb) (09/13/24 1045)    Body mass index is 20.81 kg/m².      Intake/Output Summary (Last 24 hours) at 9/16/2024 1357  Last data filed at 9/16/2024 1000  Gross per 24 hour   Intake 120 ml   Output --   Net 120 ml       Physical Exam  Vitals and nursing note reviewed.   Constitutional:       General: He is not in acute distress.     Appearance: He is not ill-appearing, toxic-appearing or diaphoretic.   HENT:      Head: Normocephalic and atraumatic.      Nose: Nose normal.      Mouth/Throat:      Mouth: Mucous membranes are dry.      Pharynx: Oropharynx is clear.   Eyes:      General: No scleral icterus.  Cardiovascular:      Rate and Rhythm: Normal rate and regular rhythm.      Pulses: Normal pulses.      Heart sounds: Normal heart sounds.   Pulmonary:      Effort: Pulmonary effort is normal.      Breath sounds: Normal breath sounds.   Abdominal:      General: There is distension.      Tenderness: There is abdominal tenderness in the epigastric area, left upper quadrant and left lower quadrant. There is no guarding or rebound.   Musculoskeletal:      Cervical back: Normal range of motion and neck supple.      Right lower leg: No edema.      Left lower leg: No edema.   Skin:     General: Skin is warm and dry.      Capillary Refill: Capillary refill takes less than 2 seconds.   Neurological:      Mental Status: He is alert and oriented to person, place, and time.   Psychiatric:         Behavior: Behavior normal.         Significant Labs: All pertinent labs within the past 24 hours have been reviewed.    Recent Results (from the past 24 hour(s))   Lactic Acid, Plasma     Collection Time: 09/15/24  4:01 PM   Result Value Ref Range    Lactate (Lactic Acid) 0.7 0.5 - 2.2 mmol/L   Basic metabolic panel    Collection Time: 09/16/24  2:20 AM   Result Value Ref Range    Sodium 133 (L) 136 - 145 mmol/L    Potassium 3.6 3.5 - 5.1 mmol/L    Chloride 99 95 - 110 mmol/L    CO2 26 23 - 29 mmol/L    Glucose 95 70 - 110 mg/dL    BUN 5 (L) 6 - 20 mg/dL    Creatinine 0.8 0.5 - 1.4 mg/dL    Calcium 8.7 8.7 - 10.5 mg/dL    Anion Gap 8 8 - 16 mmol/L    eGFR >60.0 >60 mL/min/1.73 m^2   Magnesium    Collection Time: 09/16/24  2:20 AM   Result Value Ref Range    Magnesium 1.8 1.6 - 2.6 mg/dL   Phosphorus    Collection Time: 09/16/24  2:20 AM   Result Value Ref Range    Phosphorus 4.8 (H) 2.7 - 4.5 mg/dL   CBC auto differential    Collection Time: 09/16/24  2:20 AM   Result Value Ref Range    WBC 4.94 3.90 - 12.70 K/uL    RBC 4.24 (L) 4.60 - 6.20 M/uL    Hemoglobin 10.8 (L) 14.0 - 18.0 g/dL    Hematocrit 36.3 (L) 40.0 - 54.0 %    MCV 86 82 - 98 fL    MCH 25.5 (L) 27.0 - 31.0 pg    MCHC 29.8 (L) 32.0 - 36.0 g/dL    RDW 18.4 (H) 11.5 - 14.5 %    Platelets 156 150 - 450 K/uL    MPV 10.6 9.2 - 12.9 fL    Immature Granulocytes 0.2 0.0 - 0.5 %    Gran # (ANC) 2.5 1.8 - 7.7 K/uL    Immature Grans (Abs) 0.01 0.00 - 0.04 K/uL    Lymph # 1.7 1.0 - 4.8 K/uL    Mono # 0.5 0.3 - 1.0 K/uL    Eos # 0.2 0.0 - 0.5 K/uL    Baso # 0.03 0.00 - 0.20 K/uL    nRBC 0 0 /100 WBC    Gran % 50.0 38.0 - 73.0 %    Lymph % 35.0 18.0 - 48.0 %    Mono % 9.5 4.0 - 15.0 %    Eosinophil % 4.7 0.0 - 8.0 %    Basophil % 0.6 0.0 - 1.9 %    Differential Method Automated        Significant Imaging: I have reviewed all pertinent imaging results/findings within the past 24 hours.    Imaging Results              CT Abdomen Pelvis With IV Contrast NO Oral Contrast (Final result)  Result time 09/13/24 14:32:21      Final result by Juan Sexton MD (09/13/24 14:32:21)                   Impression:      1. Low attenuating fluid collection along  the superior aspect of the pancreas suggests pseudo cyst, similar in appearance to the previous exam.  No surrounding inflammation to suggest interval infection.  No convincing peripancreatic inflammation.  Correlation with laboratory values is advised as CT findings of acute pancreatitis lag behind laboratory abnormality.  2. Findings suggest hepatic steatosis noting hepatomegaly, correlation with LFTs recommended.  3. There are a few scattered fluid-filled small bowel loops, nonspecific.  This may be on the basis of developing right colonic constipation however early enteritis remains a consideration.  Correlation is advised.  4. Please see above for several additional findings.      Electronically signed by: Juan Sexton MD  Date:    09/13/2024  Time:    14:32               Narrative:    EXAMINATION:  CT ABDOMEN PELVIS WITH IV CONTRAST    CLINICAL HISTORY:  Pancreatitis, acute, severe;    TECHNIQUE:  Low dose axial images, sagittal and coronal reformations were obtained from the lung bases to the pubic symphysis following the IV administration of 75 mL of Omnipaque 350 .  Oral contrast was not given.    COMPARISON:  07/24/2024    FINDINGS:  Images of the lower thorax are unremarkable.    The liver is diffusely hypoattenuating suggesting steatosis, correlation with LFTs recommended.  The liver is enlarged.  The spleen, adrenal glands and gallbladder are unremarkable.  The stomach is relatively decompressed, no gastric wall thickening.  The pancreas enhances homogeneously without pancreatic ductal dilation.  There is a lobular fluid collection superior to the pancreas, similar to the previous examination.  On coronal imaging this measures 6.1 x 3.6 cm suggesting pseudo cyst.  No interval wall thickening or inflammation about the collection to suggest infection.  The portal vein, SMV, celiac axis and SMA all are patent.  There is some irregularity involving the splenic vein noting a few scattered perigastric  collaterals.  No significant abdominal lymphadenopathy.    The kidneys enhance symmetrically without hydronephrosis or nephrolithiasis.  The bilateral ureters are unremarkable without calculi seen.  The urinary bladder is nondistended noting there may be some residual wall thickening.  The prostate is not enlarged.    The large bowel is grossly unremarkable noting moderate stool in the right colon.  The terminal ileum and appendix are unremarkable.  There are a few scattered fluid-filled small bowel loops.  There are a few scattered shotty periaortic, pericaval, and mesenteric lymph nodes.  No focal organized pelvic fluid collection.    No acute osseous abnormalities.  No significant inguinal lymphadenopathy.                                        Assessment/Plan:      * Acute on chronic pancreatitis  Nausea and vomiting  Abdominal pain  Fluid collection of pancreas    Lipase 60  Lipid panel added; previously without hypertriglyceridemia - TG 53  Unclear etiology; notably, with PV and history of thrombus, always a consideration; of note, CTA last admission unremarkable  Liver ultrasound with Doppler without obvious thrombus visualized  CT scan on admission with stable fluid collection of pancreas; no convincing peripancreatic inflammation or surrounding inflammation to suggest interval infection  Continue aggressive IVF  Continue IV antiemetics prn  Pain control - continuing IV Dilaudid until able to tolerate p.o. options reliably from intake and pain control perspective  Advanced to low saturated fat and low cholesterol diet    Constipation  Concern for overflow diarrhea  Address with bowel regimen; may be contributing to abdominal pain, nausea, vomiting      Hypokalemia  Patient's most recent potassium results are listed below.   Recent Labs     09/14/24  0337 09/15/24  0632 09/16/24  0220   K 3.4* 4.0 3.6       Plan  - Replete potassium per protocol  - Monitor potassium Daily  - Patient's hypokalemia is  worsening. Will continue current treatment      VTE Risk Mitigation (From admission, onward)           Ordered     heparin (porcine) injection 5,000 Units  Every 8 hours         09/13/24 1654     IP VTE HIGH RISK PATIENT  Once         09/13/24 1654     Place sequential compression device  Until discontinued         09/13/24 1654                    Discharge Planning   CASTILLO: 9/18/2024     Code Status: Full Code   Is the patient medically ready for discharge?:     Reason for patient still in hospital (select all that apply): Patient trending condition and Treatment  Discharge Plan A: Home                  Stephanie Xavier MD  Department of Hospital Medicine   [facility] - Internal Medicine Telemetry

## 2024-09-16 NOTE — SUBJECTIVE & OBJECTIVE
Interval History:  Patient seen and examined at bedside.    Continued abdominal pain, nausea, emesis.  Emesis is brown, not coffee-ground.   Last BM yesterday.   Patient updated regarding care plan.      Review of Systems   Constitutional:  Negative for fever.   HENT:  Negative for congestion, sore throat and trouble swallowing.    Eyes:  Negative for visual disturbance.   Respiratory:  Negative for cough, chest tightness and shortness of breath.    Cardiovascular:  Negative for chest pain, palpitations and leg swelling.   Gastrointestinal:  Positive for abdominal pain, constipation, nausea and vomiting.   Genitourinary:  Negative for dysuria.   Musculoskeletal:  Negative for arthralgias and myalgias.   Skin:  Negative for rash.   Neurological:  Negative for dizziness and light-headedness.   Psychiatric/Behavioral:  Negative for confusion and decreased concentration.        Objective:    Temp: 98.1 °F (36.7 °C) (09/16/24 1231)  Pulse: 76 (09/16/24 1231)  Resp: 19 (09/16/24 1231)  BP: 114/74 (09/16/24 1231)  SpO2: 95 % (09/16/24 1231)    Weight: 65.8 kg (145 lb) (09/13/24 1045)    Body mass index is 20.81 kg/m².      Intake/Output Summary (Last 24 hours) at 9/16/2024 1357  Last data filed at 9/16/2024 1000  Gross per 24 hour   Intake 120 ml   Output --   Net 120 ml       Physical Exam  Vitals and nursing note reviewed.   Constitutional:       General: He is not in acute distress.     Appearance: He is not ill-appearing, toxic-appearing or diaphoretic.   HENT:      Head: Normocephalic and atraumatic.      Nose: Nose normal.      Mouth/Throat:      Mouth: Mucous membranes are dry.      Pharynx: Oropharynx is clear.   Eyes:      General: No scleral icterus.  Cardiovascular:      Rate and Rhythm: Normal rate and regular rhythm.      Pulses: Normal pulses.      Heart sounds: Normal heart sounds.   Pulmonary:      Effort: Pulmonary effort is normal.      Breath sounds: Normal breath sounds.   Abdominal:      General: There  is distension.      Tenderness: There is abdominal tenderness in the epigastric area, left upper quadrant and left lower quadrant. There is no guarding or rebound.   Musculoskeletal:      Cervical back: Normal range of motion and neck supple.      Right lower leg: No edema.      Left lower leg: No edema.   Skin:     General: Skin is warm and dry.      Capillary Refill: Capillary refill takes less than 2 seconds.   Neurological:      Mental Status: He is alert and oriented to person, place, and time.   Psychiatric:         Behavior: Behavior normal.         Significant Labs: All pertinent labs within the past 24 hours have been reviewed.    Recent Results (from the past 24 hour(s))   Lactic Acid, Plasma    Collection Time: 09/15/24  4:01 PM   Result Value Ref Range    Lactate (Lactic Acid) 0.7 0.5 - 2.2 mmol/L   Basic metabolic panel    Collection Time: 09/16/24  2:20 AM   Result Value Ref Range    Sodium 133 (L) 136 - 145 mmol/L    Potassium 3.6 3.5 - 5.1 mmol/L    Chloride 99 95 - 110 mmol/L    CO2 26 23 - 29 mmol/L    Glucose 95 70 - 110 mg/dL    BUN 5 (L) 6 - 20 mg/dL    Creatinine 0.8 0.5 - 1.4 mg/dL    Calcium 8.7 8.7 - 10.5 mg/dL    Anion Gap 8 8 - 16 mmol/L    eGFR >60.0 >60 mL/min/1.73 m^2   Magnesium    Collection Time: 09/16/24  2:20 AM   Result Value Ref Range    Magnesium 1.8 1.6 - 2.6 mg/dL   Phosphorus    Collection Time: 09/16/24  2:20 AM   Result Value Ref Range    Phosphorus 4.8 (H) 2.7 - 4.5 mg/dL   CBC auto differential    Collection Time: 09/16/24  2:20 AM   Result Value Ref Range    WBC 4.94 3.90 - 12.70 K/uL    RBC 4.24 (L) 4.60 - 6.20 M/uL    Hemoglobin 10.8 (L) 14.0 - 18.0 g/dL    Hematocrit 36.3 (L) 40.0 - 54.0 %    MCV 86 82 - 98 fL    MCH 25.5 (L) 27.0 - 31.0 pg    MCHC 29.8 (L) 32.0 - 36.0 g/dL    RDW 18.4 (H) 11.5 - 14.5 %    Platelets 156 150 - 450 K/uL    MPV 10.6 9.2 - 12.9 fL    Immature Granulocytes 0.2 0.0 - 0.5 %    Gran # (ANC) 2.5 1.8 - 7.7 K/uL    Immature Grans (Abs) 0.01 0.00  - 0.04 K/uL    Lymph # 1.7 1.0 - 4.8 K/uL    Mono # 0.5 0.3 - 1.0 K/uL    Eos # 0.2 0.0 - 0.5 K/uL    Baso # 0.03 0.00 - 0.20 K/uL    nRBC 0 0 /100 WBC    Gran % 50.0 38.0 - 73.0 %    Lymph % 35.0 18.0 - 48.0 %    Mono % 9.5 4.0 - 15.0 %    Eosinophil % 4.7 0.0 - 8.0 %    Basophil % 0.6 0.0 - 1.9 %    Differential Method Automated        Significant Imaging: I have reviewed all pertinent imaging results/findings within the past 24 hours.    Imaging Results              CT Abdomen Pelvis With IV Contrast NO Oral Contrast (Final result)  Result time 09/13/24 14:32:21      Final result by Juan Sexton MD (09/13/24 14:32:21)                   Impression:      1. Low attenuating fluid collection along the superior aspect of the pancreas suggests pseudo cyst, similar in appearance to the previous exam.  No surrounding inflammation to suggest interval infection.  No convincing peripancreatic inflammation.  Correlation with laboratory values is advised as CT findings of acute pancreatitis lag behind laboratory abnormality.  2. Findings suggest hepatic steatosis noting hepatomegaly, correlation with LFTs recommended.  3. There are a few scattered fluid-filled small bowel loops, nonspecific.  This may be on the basis of developing right colonic constipation however early enteritis remains a consideration.  Correlation is advised.  4. Please see above for several additional findings.      Electronically signed by: Juan Sexton MD  Date:    09/13/2024  Time:    14:32               Narrative:    EXAMINATION:  CT ABDOMEN PELVIS WITH IV CONTRAST    CLINICAL HISTORY:  Pancreatitis, acute, severe;    TECHNIQUE:  Low dose axial images, sagittal and coronal reformations were obtained from the lung bases to the pubic symphysis following the IV administration of 75 mL of Omnipaque 350 .  Oral contrast was not given.    COMPARISON:  07/24/2024    FINDINGS:  Images of the lower thorax are unremarkable.    The liver is diffusely  hypoattenuating suggesting steatosis, correlation with LFTs recommended.  The liver is enlarged.  The spleen, adrenal glands and gallbladder are unremarkable.  The stomach is relatively decompressed, no gastric wall thickening.  The pancreas enhances homogeneously without pancreatic ductal dilation.  There is a lobular fluid collection superior to the pancreas, similar to the previous examination.  On coronal imaging this measures 6.1 x 3.6 cm suggesting pseudo cyst.  No interval wall thickening or inflammation about the collection to suggest infection.  The portal vein, SMV, celiac axis and SMA all are patent.  There is some irregularity involving the splenic vein noting a few scattered perigastric collaterals.  No significant abdominal lymphadenopathy.    The kidneys enhance symmetrically without hydronephrosis or nephrolithiasis.  The bilateral ureters are unremarkable without calculi seen.  The urinary bladder is nondistended noting there may be some residual wall thickening.  The prostate is not enlarged.    The large bowel is grossly unremarkable noting moderate stool in the right colon.  The terminal ileum and appendix are unremarkable.  There are a few scattered fluid-filled small bowel loops.  There are a few scattered shotty periaortic, pericaval, and mesenteric lymph nodes.  No focal organized pelvic fluid collection.    No acute osseous abnormalities.  No significant inguinal lymphadenopathy.

## 2024-09-16 NOTE — NURSING
Nurses Note -- 4 Eyes      9/15/2024   7:10 PM      Skin assessed during: Q Shift Change      [x] No Altered Skin Integrity Present    [x]Prevention Measures Documented      [] Yes- Altered Skin Integrity Present or Discovered   [] LDA Added if Not in Epic (Describe Wound)   [] New Altered Skin Integrity was Present on Admit and Documented in LDA   [] Wound Image Taken    Wound Care Consulted? No    Attending Nurse:  Jennifer DORAN RN    Second RN/Staff Member:  Cesilia ANDREW RN

## 2024-09-16 NOTE — NURSING
Per handoff received from Cesilia ANDREW RN. Patient care assumed. Patients overall condition assessed and patient appears to be in NAD with complaints of abdominal pain to be treated. 20g LFA PIV is clean, dry, and intact with LR infusing at 75cc/hr. Call light in reach and patient instructed to inform the nurse if anything is needed. Patient stable and is continually being monitored.

## 2024-09-16 NOTE — PLAN OF CARE
Problem: Adult Inpatient Plan of Care  Goal: Absence of Hospital-Acquired Illness or Injury  Intervention: Identify and Manage Fall Risk  Flowsheets (Taken 9/16/2024 0218)  Safety Promotion/Fall Prevention:   Fall Risk reviewed with patient/family   lighting adjusted   medications reviewed   side rails raised x 2  Intervention: Prevent Skin Injury  Flowsheets (Taken 9/16/2024 0218)  Body Position: position changed independently  Intervention: Prevent and Manage VTE (Venous Thromboembolism) Risk  Flowsheets (Taken 9/16/2024 0218)  VTE Prevention/Management: ROM (active) performed  Goal: Optimal Comfort and Wellbeing  Intervention: Monitor Pain and Promote Comfort  Flowsheets (Taken 9/16/2024 0218)  Pain Management Interventions: pain management plan reviewed with patient/caregiver  Intervention: Provide Person-Centered Care  Flowsheets (Taken 9/16/2024 0218)  Trust Relationship/Rapport:   care explained   choices provided   questions answered   questions encouraged   reassurance provided   thoughts/feelings acknowledged     Problem: Pancreatitis  Goal: Fluid and Electrolyte Balance  Intervention: Monitor and Manage Fluid and Electrolyte Balance  Flowsheets (Taken 9/16/2024 0218)  Fluid/Electrolyte Management: intravenous fluid replacement initiated  Goal: Absence of Infection Signs and Symptoms  Intervention: Prevent or Manage Infection  Flowsheets (Taken 9/16/2024 0218)  Infection Management:   aseptic technique maintained   cultures obtained and sent to lab  Goal: Optimal Pain Control and Function  Intervention: Monitor and Manage Pain  Flowsheets (Taken 9/16/2024 0218)  Sleep/Rest Enhancement: relaxation techniques promoted  Pain Management Interventions: pain management plan reviewed with patient/caregiver  Goal: Effective Oxygenation and Ventilation  Intervention: Optimize Oxygenation and Ventilation  Flowsheets (Taken 9/16/2024 0218)  Airway/Ventilation Management:   airway patency maintained   calming  measures promoted  Cough And Deep Breathing: done independently per patient  Head of Bed (HOB) Positioning: HOB at 60-90 degrees     Problem: Nausea and Vomiting  Goal: Nausea and Vomiting Relief  Intervention: Prevent and Manage Nausea and Vomiting  Flowsheets (Taken 9/16/2024 0218)  Fluid/Electrolyte Management: intravenous fluid replacement initiated

## 2024-09-16 NOTE — PLAN OF CARE
Problem: Adult Inpatient Plan of Care  Goal: Plan of Care Review  Outcome: Progressing  Goal: Patient-Specific Goal (Individualized)  Outcome: Progressing  Goal: Absence of Hospital-Acquired Illness or Injury  Outcome: Progressing  Goal: Optimal Comfort and Wellbeing  Outcome: Progressing  Goal: Readiness for Transition of Care  Outcome: Progressing     Problem: Pancreatitis  Goal: Fluid and Electrolyte Balance  Outcome: Progressing  Goal: Absence of Infection Signs and Symptoms  Outcome: Progressing  Goal: Optimal Nutrition Delivery  Outcome: Progressing  Goal: Optimal Pain Control and Function  Outcome: Progressing  Goal: Effective Oxygenation and Ventilation  Outcome: Progressing     Problem: Nausea and Vomiting  Goal: Nausea and Vomiting Relief  Outcome: Progressing

## 2024-09-17 LAB
ANION GAP SERPL CALC-SCNC: 9 MMOL/L (ref 8–16)
BASOPHILS # BLD AUTO: 0.02 K/UL (ref 0–0.2)
BASOPHILS NFR BLD: 0.4 % (ref 0–1.9)
BUN SERPL-MCNC: 8 MG/DL (ref 6–20)
CALCIUM SERPL-MCNC: 8.9 MG/DL (ref 8.7–10.5)
CHLORIDE SERPL-SCNC: 103 MMOL/L (ref 95–110)
CO2 SERPL-SCNC: 23 MMOL/L (ref 23–29)
CREAT SERPL-MCNC: 0.8 MG/DL (ref 0.5–1.4)
DIFFERENTIAL METHOD BLD: ABNORMAL
EOSINOPHIL # BLD AUTO: 0.3 K/UL (ref 0–0.5)
EOSINOPHIL NFR BLD: 4.7 % (ref 0–8)
ERYTHROCYTE [DISTWIDTH] IN BLOOD BY AUTOMATED COUNT: 18.6 % (ref 11.5–14.5)
EST. GFR  (NO RACE VARIABLE): >60 ML/MIN/1.73 M^2
GLUCOSE SERPL-MCNC: 112 MG/DL (ref 70–110)
HCT VFR BLD AUTO: 38.5 % (ref 40–54)
HGB BLD-MCNC: 11.1 G/DL (ref 14–18)
IMM GRANULOCYTES # BLD AUTO: 0.02 K/UL (ref 0–0.04)
IMM GRANULOCYTES NFR BLD AUTO: 0.4 % (ref 0–0.5)
LYMPHOCYTES # BLD AUTO: 1.8 K/UL (ref 1–4.8)
LYMPHOCYTES NFR BLD: 34.7 % (ref 18–48)
MAGNESIUM SERPL-MCNC: 1.9 MG/DL (ref 1.6–2.6)
MCH RBC QN AUTO: 25.1 PG (ref 27–31)
MCHC RBC AUTO-ENTMCNC: 28.8 G/DL (ref 32–36)
MCV RBC AUTO: 87 FL (ref 82–98)
MONOCYTES # BLD AUTO: 0.7 K/UL (ref 0.3–1)
MONOCYTES NFR BLD: 13.5 % (ref 4–15)
NEUTROPHILS # BLD AUTO: 2.4 K/UL (ref 1.8–7.7)
NEUTROPHILS NFR BLD: 46.3 % (ref 38–73)
NRBC BLD-RTO: 0 /100 WBC
PHOSPHATE SERPL-MCNC: 4.8 MG/DL (ref 2.7–4.5)
PLATELET # BLD AUTO: 177 K/UL (ref 150–450)
PMV BLD AUTO: 10.4 FL (ref 9.2–12.9)
POTASSIUM SERPL-SCNC: 4.1 MMOL/L (ref 3.5–5.1)
RBC # BLD AUTO: 4.43 M/UL (ref 4.6–6.2)
SODIUM SERPL-SCNC: 135 MMOL/L (ref 136–145)
WBC # BLD AUTO: 5.27 K/UL (ref 3.9–12.7)

## 2024-09-17 PROCEDURE — 11000001 HC ACUTE MED/SURG PRIVATE ROOM

## 2024-09-17 PROCEDURE — 25000003 PHARM REV CODE 250: Performed by: STUDENT IN AN ORGANIZED HEALTH CARE EDUCATION/TRAINING PROGRAM

## 2024-09-17 PROCEDURE — 84100 ASSAY OF PHOSPHORUS: CPT | Performed by: STUDENT IN AN ORGANIZED HEALTH CARE EDUCATION/TRAINING PROGRAM

## 2024-09-17 PROCEDURE — 85025 COMPLETE CBC W/AUTO DIFF WBC: CPT | Performed by: STUDENT IN AN ORGANIZED HEALTH CARE EDUCATION/TRAINING PROGRAM

## 2024-09-17 PROCEDURE — 63600175 PHARM REV CODE 636 W HCPCS: Performed by: STUDENT IN AN ORGANIZED HEALTH CARE EDUCATION/TRAINING PROGRAM

## 2024-09-17 PROCEDURE — 36415 COLL VENOUS BLD VENIPUNCTURE: CPT | Performed by: STUDENT IN AN ORGANIZED HEALTH CARE EDUCATION/TRAINING PROGRAM

## 2024-09-17 PROCEDURE — 80048 BASIC METABOLIC PNL TOTAL CA: CPT | Performed by: STUDENT IN AN ORGANIZED HEALTH CARE EDUCATION/TRAINING PROGRAM

## 2024-09-17 PROCEDURE — 83735 ASSAY OF MAGNESIUM: CPT | Performed by: STUDENT IN AN ORGANIZED HEALTH CARE EDUCATION/TRAINING PROGRAM

## 2024-09-17 RX ORDER — SODIUM CHLORIDE, SODIUM LACTATE, POTASSIUM CHLORIDE, CALCIUM CHLORIDE 600; 310; 30; 20 MG/100ML; MG/100ML; MG/100ML; MG/100ML
INJECTION, SOLUTION INTRAVENOUS CONTINUOUS
Status: DISCONTINUED | OUTPATIENT
Start: 2024-09-17 | End: 2024-09-17

## 2024-09-17 RX ORDER — OXYCODONE HYDROCHLORIDE 10 MG/1
20 TABLET ORAL EVERY 4 HOURS PRN
Status: DISCONTINUED | OUTPATIENT
Start: 2024-09-17 | End: 2024-09-21 | Stop reason: HOSPADM

## 2024-09-17 RX ORDER — HYDROMORPHONE HYDROCHLORIDE 2 MG/ML
2 INJECTION, SOLUTION INTRAMUSCULAR; INTRAVENOUS; SUBCUTANEOUS EVERY 4 HOURS
Status: DISCONTINUED | OUTPATIENT
Start: 2024-09-17 | End: 2024-09-18

## 2024-09-17 RX ADMIN — OXYCODONE 15 MG: 5 TABLET ORAL at 04:09

## 2024-09-17 RX ADMIN — OXYCODONE 15 MG: 5 TABLET ORAL at 12:09

## 2024-09-17 RX ADMIN — BICTEGRAVIR SODIUM, EMTRICITABINE, AND TENOFOVIR ALAFENAMIDE FUMARATE 1 TABLET: 50; 200; 25 TABLET ORAL at 08:09

## 2024-09-17 RX ADMIN — HYDROMORPHONE HYDROCHLORIDE 2 MG: 2 INJECTION, SOLUTION INTRAMUSCULAR; INTRAVENOUS; SUBCUTANEOUS at 04:09

## 2024-09-17 RX ADMIN — LACTULOSE 30 G: 20 SOLUTION ORAL at 11:09

## 2024-09-17 RX ADMIN — SODIUM CHLORIDE, SODIUM LACTATE, POTASSIUM CHLORIDE, AND CALCIUM CHLORIDE: 600; 310; 30; 20 INJECTION, SOLUTION INTRAVENOUS at 02:09

## 2024-09-17 RX ADMIN — HYDROMORPHONE HYDROCHLORIDE 2 MG: 2 INJECTION, SOLUTION INTRAMUSCULAR; INTRAVENOUS; SUBCUTANEOUS at 06:09

## 2024-09-17 RX ADMIN — OXYCODONE HYDROCHLORIDE 20 MG: 10 TABLET ORAL at 09:09

## 2024-09-17 RX ADMIN — HYDROMORPHONE HYDROCHLORIDE 2 MG: 2 INJECTION, SOLUTION INTRAMUSCULAR; INTRAVENOUS; SUBCUTANEOUS at 02:09

## 2024-09-17 RX ADMIN — HYDROMORPHONE HYDROCHLORIDE 2 MG: 2 INJECTION, SOLUTION INTRAMUSCULAR; INTRAVENOUS; SUBCUTANEOUS at 08:09

## 2024-09-17 RX ADMIN — PROMETHAZINE HYDROCHLORIDE 6.25 MG: 25 INJECTION INTRAMUSCULAR; INTRAVENOUS at 02:09

## 2024-09-17 RX ADMIN — HYDROMORPHONE HYDROCHLORIDE 2 MG: 2 INJECTION, SOLUTION INTRAMUSCULAR; INTRAVENOUS; SUBCUTANEOUS at 10:09

## 2024-09-17 RX ADMIN — PROCHLORPERAZINE EDISYLATE 5 MG: 5 INJECTION INTRAMUSCULAR; INTRAVENOUS at 01:09

## 2024-09-17 RX ADMIN — HYDROMORPHONE HYDROCHLORIDE 2 MG: 2 INJECTION, SOLUTION INTRAMUSCULAR; INTRAVENOUS; SUBCUTANEOUS at 01:09

## 2024-09-17 RX ADMIN — HYDROMORPHONE HYDROCHLORIDE 2 MG: 2 INJECTION, SOLUTION INTRAMUSCULAR; INTRAVENOUS; SUBCUTANEOUS at 11:09

## 2024-09-17 RX ADMIN — PROCHLORPERAZINE EDISYLATE 5 MG: 5 INJECTION INTRAMUSCULAR; INTRAVENOUS at 06:09

## 2024-09-17 NOTE — ASSESSMENT & PLAN NOTE
Nausea and vomiting  Abdominal pain  Fluid collection of pancreas    Lipase 60  Lipid panel added; previously without hypertriglyceridemia - TG 53  Unclear etiology; notably, with PV and history of thrombus, always a consideration; of note, CTA last admission unremarkable  Liver ultrasound with Doppler without obvious thrombus visualized  CT scan on admission with stable fluid collection of pancreas; no convincing peripancreatic inflammation or surrounding inflammation to suggest interval infection  Continue aggressive IVF  Continue IV antiemetics prn  Pain control - continuing IV Dilaudid until able to tolerate p.o. options reliably from intake and pain control perspective; wean frequency this afternoon if tolerated  Advanced to low saturated fat and low cholesterol diet

## 2024-09-17 NOTE — PLAN OF CARE
[facility] - Internal Medicine Telemetry  Discharge Reassessment    Primary Care Provider: Pina Jones NP    Expected Discharge Date: 9/18/2024    Reassessment (most recent)       Discharge Reassessment - 09/17/24 1523          Discharge Reassessment    Assessment Type Discharge Planning Reassessment     Did the patient's condition or plan change since previous assessment? No     Discharge Plan discussed with: Patient     Communicated CASTILLO with patient/caregiver Yes     Discharge Plan A Home     Discharge Plan B Home     DME Needed Upon Discharge  none     Transition of Care Barriers None     Why the patient remains in the hospital Requires continued medical care        Post-Acute Status    Discharge Delays None known at this time                   Patient is not medically ready for discharge.  Per Md.; Patient seen and examined at bedside.    Continued abdominal pain, nausea; 1x emesis overnight. Rough night as IV had to be replaced.   Discussed decreasing Dilaudid frequency this afternoon.  Patient updated regarding care plan.    Discharge Plan A and Plan B have been determined by review of patient's clinical status, future medical and therapeutic needs, and coverage/benefits for post-acute care in coordination with multidisciplinary team members.     Niki Reza, FRANNY  - [facility]

## 2024-09-17 NOTE — ASSESSMENT & PLAN NOTE
Patient's most recent potassium results are listed below.   Recent Labs     09/15/24  0632 09/16/24  0220 09/17/24  0451   K 4.0 3.6 4.1       Plan  - Replete potassium per protocol  - Monitor potassium Daily  - Patient's hypokalemia is worsening. Will continue current treatment

## 2024-09-17 NOTE — SUBJECTIVE & OBJECTIVE
Interval History:  Patient seen and examined at bedside.    Continued abdominal pain, nausea; 1x emesis overnight. Rough night as IV had to be replaced.   Discussed decreasing Dilaudid frequency this afternoon.  Patient updated regarding care plan.      Review of Systems   Constitutional:  Negative for fever.   HENT:  Negative for congestion, sore throat and trouble swallowing.    Eyes:  Negative for visual disturbance.   Respiratory:  Negative for cough, chest tightness and shortness of breath.    Cardiovascular:  Negative for chest pain, palpitations and leg swelling.   Gastrointestinal:  Positive for abdominal pain, constipation, nausea and vomiting.   Genitourinary:  Negative for dysuria.   Musculoskeletal:  Negative for arthralgias and myalgias.   Skin:  Negative for rash.   Neurological:  Negative for dizziness and light-headedness.   Psychiatric/Behavioral:  Negative for confusion and decreased concentration.        Objective:    Temp: (!) 32 °F (0 °C) (09/17/24 1134)  Pulse: 77 (09/17/24 1111)  Resp: 19 (09/17/24 1134)  BP: 120/75 (09/17/24 1111)  SpO2: (!) 94 % (09/17/24 1111)    Weight: 65.8 kg (145 lb) (09/13/24 1045)    Body mass index is 20.81 kg/m².    No intake or output data in the 24 hours ending 09/17/24 1218      Physical Exam  Vitals and nursing note reviewed.   Constitutional:       General: He is not in acute distress.     Appearance: He is not ill-appearing, toxic-appearing or diaphoretic.   HENT:      Head: Normocephalic and atraumatic.      Nose: Nose normal.      Mouth/Throat:      Pharynx: Oropharynx is clear.   Eyes:      General: No scleral icterus.  Cardiovascular:      Rate and Rhythm: Normal rate and regular rhythm.      Pulses: Normal pulses.      Heart sounds: Normal heart sounds.   Pulmonary:      Effort: Pulmonary effort is normal.      Breath sounds: Normal breath sounds.   Abdominal:      General: There is distension.      Tenderness: There is abdominal tenderness in the  epigastric area, left upper quadrant and left lower quadrant. There is no guarding or rebound.   Musculoskeletal:      Cervical back: Normal range of motion and neck supple.      Right lower leg: No edema.      Left lower leg: No edema.   Skin:     General: Skin is warm and dry.      Capillary Refill: Capillary refill takes less than 2 seconds.   Neurological:      Mental Status: He is alert and oriented to person, place, and time.   Psychiatric:         Behavior: Behavior normal.         Significant Labs: All pertinent labs within the past 24 hours have been reviewed.    Recent Results (from the past 24 hour(s))   Basic metabolic panel    Collection Time: 09/17/24  4:51 AM   Result Value Ref Range    Sodium 135 (L) 136 - 145 mmol/L    Potassium 4.1 3.5 - 5.1 mmol/L    Chloride 103 95 - 110 mmol/L    CO2 23 23 - 29 mmol/L    Glucose 112 (H) 70 - 110 mg/dL    BUN 8 6 - 20 mg/dL    Creatinine 0.8 0.5 - 1.4 mg/dL    Calcium 8.9 8.7 - 10.5 mg/dL    Anion Gap 9 8 - 16 mmol/L    eGFR >60.0 >60 mL/min/1.73 m^2   Magnesium    Collection Time: 09/17/24  4:51 AM   Result Value Ref Range    Magnesium 1.9 1.6 - 2.6 mg/dL   Phosphorus    Collection Time: 09/17/24  4:51 AM   Result Value Ref Range    Phosphorus 4.8 (H) 2.7 - 4.5 mg/dL   CBC auto differential    Collection Time: 09/17/24  4:51 AM   Result Value Ref Range    WBC 5.27 3.90 - 12.70 K/uL    RBC 4.43 (L) 4.60 - 6.20 M/uL    Hemoglobin 11.1 (L) 14.0 - 18.0 g/dL    Hematocrit 38.5 (L) 40.0 - 54.0 %    MCV 87 82 - 98 fL    MCH 25.1 (L) 27.0 - 31.0 pg    MCHC 28.8 (L) 32.0 - 36.0 g/dL    RDW 18.6 (H) 11.5 - 14.5 %    Platelets 177 150 - 450 K/uL    MPV 10.4 9.2 - 12.9 fL    Immature Granulocytes 0.4 0.0 - 0.5 %    Gran # (ANC) 2.4 1.8 - 7.7 K/uL    Immature Grans (Abs) 0.02 0.00 - 0.04 K/uL    Lymph # 1.8 1.0 - 4.8 K/uL    Mono # 0.7 0.3 - 1.0 K/uL    Eos # 0.3 0.0 - 0.5 K/uL    Baso # 0.02 0.00 - 0.20 K/uL    nRBC 0 0 /100 WBC    Gran % 46.3 38.0 - 73.0 %    Lymph % 34.7  18.0 - 48.0 %    Mono % 13.5 4.0 - 15.0 %    Eosinophil % 4.7 0.0 - 8.0 %    Basophil % 0.4 0.0 - 1.9 %    Differential Method Automated        Significant Imaging: I have reviewed all pertinent imaging results/findings within the past 24 hours.    Imaging Results              CT Abdomen Pelvis With IV Contrast NO Oral Contrast (Final result)  Result time 09/13/24 14:32:21      Final result by Juan Sexton MD (09/13/24 14:32:21)                   Impression:      1. Low attenuating fluid collection along the superior aspect of the pancreas suggests pseudo cyst, similar in appearance to the previous exam.  No surrounding inflammation to suggest interval infection.  No convincing peripancreatic inflammation.  Correlation with laboratory values is advised as CT findings of acute pancreatitis lag behind laboratory abnormality.  2. Findings suggest hepatic steatosis noting hepatomegaly, correlation with LFTs recommended.  3. There are a few scattered fluid-filled small bowel loops, nonspecific.  This may be on the basis of developing right colonic constipation however early enteritis remains a consideration.  Correlation is advised.  4. Please see above for several additional findings.      Electronically signed by: Juan Sexton MD  Date:    09/13/2024  Time:    14:32               Narrative:    EXAMINATION:  CT ABDOMEN PELVIS WITH IV CONTRAST    CLINICAL HISTORY:  Pancreatitis, acute, severe;    TECHNIQUE:  Low dose axial images, sagittal and coronal reformations were obtained from the lung bases to the pubic symphysis following the IV administration of 75 mL of Omnipaque 350 .  Oral contrast was not given.    COMPARISON:  07/24/2024    FINDINGS:  Images of the lower thorax are unremarkable.    The liver is diffusely hypoattenuating suggesting steatosis, correlation with LFTs recommended.  The liver is enlarged.  The spleen, adrenal glands and gallbladder are unremarkable.  The stomach is relatively  decompressed, no gastric wall thickening.  The pancreas enhances homogeneously without pancreatic ductal dilation.  There is a lobular fluid collection superior to the pancreas, similar to the previous examination.  On coronal imaging this measures 6.1 x 3.6 cm suggesting pseudo cyst.  No interval wall thickening or inflammation about the collection to suggest infection.  The portal vein, SMV, celiac axis and SMA all are patent.  There is some irregularity involving the splenic vein noting a few scattered perigastric collaterals.  No significant abdominal lymphadenopathy.    The kidneys enhance symmetrically without hydronephrosis or nephrolithiasis.  The bilateral ureters are unremarkable without calculi seen.  The urinary bladder is nondistended noting there may be some residual wall thickening.  The prostate is not enlarged.    The large bowel is grossly unremarkable noting moderate stool in the right colon.  The terminal ileum and appendix are unremarkable.  There are a few scattered fluid-filled small bowel loops.  There are a few scattered shotty periaortic, pericaval, and mesenteric lymph nodes.  No focal organized pelvic fluid collection.    No acute osseous abnormalities.  No significant inguinal lymphadenopathy.

## 2024-09-17 NOTE — NURSING
Refused oral dilaudid for pain control. Refused Zofran for nausea and vomiting. Dr. Xavier aware. Continued IV fluids.

## 2024-09-17 NOTE — PLAN OF CARE
Problem: Adult Inpatient Plan of Care  Goal: Plan of Care Review  Outcome: Progressing  Goal: Patient-Specific Goal (Individualized)  Outcome: Progressing  Goal: Absence of Hospital-Acquired Illness or Injury  Outcome: Progressing  Goal: Optimal Comfort and Wellbeing  Outcome: Progressing  Goal: Readiness for Transition of Care  Outcome: Progressing     Problem: Pancreatitis  Goal: Fluid and Electrolyte Balance  Outcome: Progressing  Goal: Absence of Infection Signs and Symptoms  Outcome: Progressing  Goal: Optimal Nutrition Delivery  Outcome: Progressing  Goal: Optimal Pain Control and Function  Outcome: Progressing  Goal: Effective Oxygenation and Ventilation  Outcome: Progressing

## 2024-09-17 NOTE — PROGRESS NOTES
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  Notes feeling in his usual state of health yesterday.  However, this morning around 4:00 a.m. he was awoken by a sharp, stabbing abdominal pain.  He notes the abdominal pain is mostly in his left upper quadrant; radiated a little bit to the center and lower left quadrant.  It was so severe it caused him to have nausea and vomit.  He was concerned about his emesis as it was brown appearing. He reports no hematemesis, melena, BRBPR.  The abdominal pain is pretty much constant. He denies any fever or chills preceding the abdominal pain, however had some chills after a bout of vomiting.  He was unable to tolerate a bagel this morning.  He has been tolerating small sips of water in the emergency department since arriving this morning around 11.  In terms of diarrhea he reported to the ED, he notes that he had several very small watery bowel movements yesterday; his last good bowel movement was about a week ago.    Of note, patient was recently admitted to Hospital Medicine 7/23-7/29 with similar symptoms. 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  He was evaluated by IR; his collection was deemed too deep and surrounded by too many varices to afford good percutaneous window for drainage.  AES was then consulted; fluid collection was deemed too complex to be drained.  A CTA however was ordered to evaluate for any component of hemorrhagic pancreatitis; CTA was without signs of hemorrhagic pancreatitis.    Today, on admission, potassium mildly low at 3.4, lipase within normal limits, CT abdomen pelvis with IV contrast notable for stable fluid collection along the superior aspect of pancreas suggesting pseudocyst without surrounding inflammation to suggest interval infection; no convincing peripancreatic inflammation.  Early enteritis or developing right colonic constipation was also noted.    Overview/Hospital Course:  Admitted to hospital medicine for management of acute on chronic pancreatitis. Initiated on IV anti-emetics, IV pain control, and IVF. Deferring DVT ppx despite counseling. Due to ongoing, severe pain, ultrasound liver with Doppler ordered to evaluate abdominal venous vasculature.  No obvious venous thrombus visualized.  Lactic acid also within normal limits.    Interval History:  Patient seen and examined at bedside.    Continued abdominal pain, nausea; 1x emesis overnight. Rough night as IV had to be replaced.   Discussed decreasing Dilaudid frequency this afternoon.  Patient updated regarding care plan.      Review of Systems   Constitutional:  Negative for fever.   HENT:  Negative for congestion, sore throat and trouble swallowing.    Eyes:  Negative for visual disturbance.   Respiratory:  Negative for cough, chest tightness and shortness of breath.    Cardiovascular:  Negative for chest pain, palpitations and leg swelling.   Gastrointestinal:  Positive for abdominal pain, constipation, nausea and vomiting.   Genitourinary:  Negative  for dysuria.   Musculoskeletal:  Negative for arthralgias and myalgias.   Skin:  Negative for rash.   Neurological:  Negative for dizziness and light-headedness.   Psychiatric/Behavioral:  Negative for confusion and decreased concentration.        Objective:    Temp: (!) 32 °F (0 °C) (09/17/24 1134)  Pulse: 77 (09/17/24 1111)  Resp: 19 (09/17/24 1134)  BP: 120/75 (09/17/24 1111)  SpO2: (!) 94 % (09/17/24 1111)    Weight: 65.8 kg (145 lb) (09/13/24 1045)    Body mass index is 20.81 kg/m².    No intake or output data in the 24 hours ending 09/17/24 1218      Physical Exam  Vitals and nursing note reviewed.   Constitutional:       General: He is not in acute distress.     Appearance: He is not ill-appearing, toxic-appearing or diaphoretic.   HENT:      Head: Normocephalic and atraumatic.      Nose: Nose normal.      Mouth/Throat:      Pharynx: Oropharynx is clear.   Eyes:      General: No scleral icterus.  Cardiovascular:      Rate and Rhythm: Normal rate and regular rhythm.      Pulses: Normal pulses.      Heart sounds: Normal heart sounds.   Pulmonary:      Effort: Pulmonary effort is normal.      Breath sounds: Normal breath sounds.   Abdominal:      General: There is distension.      Tenderness: There is abdominal tenderness in the epigastric area, left upper quadrant and left lower quadrant. There is no guarding or rebound.   Musculoskeletal:      Cervical back: Normal range of motion and neck supple.      Right lower leg: No edema.      Left lower leg: No edema.   Skin:     General: Skin is warm and dry.      Capillary Refill: Capillary refill takes less than 2 seconds.   Neurological:      Mental Status: He is alert and oriented to person, place, and time.   Psychiatric:         Behavior: Behavior normal.         Significant Labs: All pertinent labs within the past 24 hours have been reviewed.    Recent Results (from the past 24 hour(s))   Basic metabolic panel    Collection Time: 09/17/24  4:51 AM   Result  Value Ref Range    Sodium 135 (L) 136 - 145 mmol/L    Potassium 4.1 3.5 - 5.1 mmol/L    Chloride 103 95 - 110 mmol/L    CO2 23 23 - 29 mmol/L    Glucose 112 (H) 70 - 110 mg/dL    BUN 8 6 - 20 mg/dL    Creatinine 0.8 0.5 - 1.4 mg/dL    Calcium 8.9 8.7 - 10.5 mg/dL    Anion Gap 9 8 - 16 mmol/L    eGFR >60.0 >60 mL/min/1.73 m^2   Magnesium    Collection Time: 09/17/24  4:51 AM   Result Value Ref Range    Magnesium 1.9 1.6 - 2.6 mg/dL   Phosphorus    Collection Time: 09/17/24  4:51 AM   Result Value Ref Range    Phosphorus 4.8 (H) 2.7 - 4.5 mg/dL   CBC auto differential    Collection Time: 09/17/24  4:51 AM   Result Value Ref Range    WBC 5.27 3.90 - 12.70 K/uL    RBC 4.43 (L) 4.60 - 6.20 M/uL    Hemoglobin 11.1 (L) 14.0 - 18.0 g/dL    Hematocrit 38.5 (L) 40.0 - 54.0 %    MCV 87 82 - 98 fL    MCH 25.1 (L) 27.0 - 31.0 pg    MCHC 28.8 (L) 32.0 - 36.0 g/dL    RDW 18.6 (H) 11.5 - 14.5 %    Platelets 177 150 - 450 K/uL    MPV 10.4 9.2 - 12.9 fL    Immature Granulocytes 0.4 0.0 - 0.5 %    Gran # (ANC) 2.4 1.8 - 7.7 K/uL    Immature Grans (Abs) 0.02 0.00 - 0.04 K/uL    Lymph # 1.8 1.0 - 4.8 K/uL    Mono # 0.7 0.3 - 1.0 K/uL    Eos # 0.3 0.0 - 0.5 K/uL    Baso # 0.02 0.00 - 0.20 K/uL    nRBC 0 0 /100 WBC    Gran % 46.3 38.0 - 73.0 %    Lymph % 34.7 18.0 - 48.0 %    Mono % 13.5 4.0 - 15.0 %    Eosinophil % 4.7 0.0 - 8.0 %    Basophil % 0.4 0.0 - 1.9 %    Differential Method Automated        Significant Imaging: I have reviewed all pertinent imaging results/findings within the past 24 hours.    Imaging Results              CT Abdomen Pelvis With IV Contrast NO Oral Contrast (Final result)  Result time 09/13/24 14:32:21      Final result by Juan Sexton MD (09/13/24 14:32:21)                   Impression:      1. Low attenuating fluid collection along the superior aspect of the pancreas suggests pseudo cyst, similar in appearance to the previous exam.  No surrounding inflammation to suggest interval infection.  No  convincing peripancreatic inflammation.  Correlation with laboratory values is advised as CT findings of acute pancreatitis lag behind laboratory abnormality.  2. Findings suggest hepatic steatosis noting hepatomegaly, correlation with LFTs recommended.  3. There are a few scattered fluid-filled small bowel loops, nonspecific.  This may be on the basis of developing right colonic constipation however early enteritis remains a consideration.  Correlation is advised.  4. Please see above for several additional findings.      Electronically signed by: Juan Sexton MD  Date:    09/13/2024  Time:    14:32               Narrative:    EXAMINATION:  CT ABDOMEN PELVIS WITH IV CONTRAST    CLINICAL HISTORY:  Pancreatitis, acute, severe;    TECHNIQUE:  Low dose axial images, sagittal and coronal reformations were obtained from the lung bases to the pubic symphysis following the IV administration of 75 mL of Omnipaque 350 .  Oral contrast was not given.    COMPARISON:  07/24/2024    FINDINGS:  Images of the lower thorax are unremarkable.    The liver is diffusely hypoattenuating suggesting steatosis, correlation with LFTs recommended.  The liver is enlarged.  The spleen, adrenal glands and gallbladder are unremarkable.  The stomach is relatively decompressed, no gastric wall thickening.  The pancreas enhances homogeneously without pancreatic ductal dilation.  There is a lobular fluid collection superior to the pancreas, similar to the previous examination.  On coronal imaging this measures 6.1 x 3.6 cm suggesting pseudo cyst.  No interval wall thickening or inflammation about the collection to suggest infection.  The portal vein, SMV, celiac axis and SMA all are patent.  There is some irregularity involving the splenic vein noting a few scattered perigastric collaterals.  No significant abdominal lymphadenopathy.    The kidneys enhance symmetrically without hydronephrosis or nephrolithiasis.  The bilateral ureters are  unremarkable without calculi seen.  The urinary bladder is nondistended noting there may be some residual wall thickening.  The prostate is not enlarged.    The large bowel is grossly unremarkable noting moderate stool in the right colon.  The terminal ileum and appendix are unremarkable.  There are a few scattered fluid-filled small bowel loops.  There are a few scattered shotty periaortic, pericaval, and mesenteric lymph nodes.  No focal organized pelvic fluid collection.    No acute osseous abnormalities.  No significant inguinal lymphadenopathy.                                        Assessment/Plan:      * Acute on chronic pancreatitis  Nausea and vomiting  Abdominal pain  Fluid collection of pancreas    Lipase 60  Lipid panel added; previously without hypertriglyceridemia - TG 53  Unclear etiology; notably, with PV and history of thrombus, always a consideration; of note, CTA last admission unremarkable  Liver ultrasound with Doppler without obvious thrombus visualized  CT scan on admission with stable fluid collection of pancreas; no convincing peripancreatic inflammation or surrounding inflammation to suggest interval infection  Continue aggressive IVF  Continue IV antiemetics prn  Pain control - continuing IV Dilaudid until able to tolerate p.o. options reliably from intake and pain control perspective; wean frequency this afternoon if tolerated  Advanced to low saturated fat and low cholesterol diet    Constipation  Concern for overflow diarrhea  Address with bowel regimen; may be contributing to abdominal pain, nausea, vomiting      Hypokalemia  Patient's most recent potassium results are listed below.   Recent Labs     09/15/24  0632 09/16/24  0220 09/17/24  0451   K 4.0 3.6 4.1       Plan  - Replete potassium per protocol  - Monitor potassium Daily  - Patient's hypokalemia is worsening. Will continue current treatment      VTE Risk Mitigation (From admission, onward)           Ordered     heparin  (porcine) injection 5,000 Units  Every 8 hours         09/13/24 1654     IP VTE HIGH RISK PATIENT  Once         09/13/24 1654     Place sequential compression device  Until discontinued         09/13/24 1654                    Discharge Planning   CASTILLO: 9/18/2024     Code Status: Full Code   Is the patient medically ready for discharge?:     Reason for patient still in hospital (select all that apply): Patient trending condition and Treatment  Discharge Plan A: Home                  Stephanie Xavier MD  Department of Hospital Medicine   [facility] - Internal Medicine Telemetry

## 2024-09-18 PROCEDURE — 63600175 PHARM REV CODE 636 W HCPCS: Performed by: STUDENT IN AN ORGANIZED HEALTH CARE EDUCATION/TRAINING PROGRAM

## 2024-09-18 PROCEDURE — 11000001 HC ACUTE MED/SURG PRIVATE ROOM

## 2024-09-18 PROCEDURE — 25000003 PHARM REV CODE 250: Performed by: STUDENT IN AN ORGANIZED HEALTH CARE EDUCATION/TRAINING PROGRAM

## 2024-09-18 RX ORDER — SODIUM CHLORIDE 9 MG/ML
INJECTION, SOLUTION INTRAVENOUS CONTINUOUS
Status: DISCONTINUED | OUTPATIENT
Start: 2024-09-18 | End: 2024-09-20

## 2024-09-18 RX ORDER — AMOXICILLIN 250 MG
1 CAPSULE ORAL 2 TIMES DAILY
Status: DISCONTINUED | OUTPATIENT
Start: 2024-09-18 | End: 2024-09-21 | Stop reason: HOSPADM

## 2024-09-18 RX ORDER — HYDROMORPHONE HYDROCHLORIDE 2 MG/ML
2 INJECTION, SOLUTION INTRAMUSCULAR; INTRAVENOUS; SUBCUTANEOUS EVERY 4 HOURS PRN
Status: DISCONTINUED | OUTPATIENT
Start: 2024-09-18 | End: 2024-09-21

## 2024-09-18 RX ORDER — MECLIZINE HYDROCHLORIDE 25 MG/1
25 TABLET ORAL 3 TIMES DAILY PRN
Status: DISCONTINUED | OUTPATIENT
Start: 2024-09-18 | End: 2024-09-21 | Stop reason: HOSPADM

## 2024-09-18 RX ADMIN — OXYCODONE HYDROCHLORIDE 20 MG: 10 TABLET ORAL at 03:09

## 2024-09-18 RX ADMIN — HYDROMORPHONE HYDROCHLORIDE 2 MG: 2 INJECTION, SOLUTION INTRAMUSCULAR; INTRAVENOUS; SUBCUTANEOUS at 02:09

## 2024-09-18 RX ADMIN — SODIUM CHLORIDE: 9 INJECTION, SOLUTION INTRAVENOUS at 03:09

## 2024-09-18 RX ADMIN — LACTULOSE 30 G: 20 SOLUTION ORAL at 03:09

## 2024-09-18 RX ADMIN — HYDROMORPHONE HYDROCHLORIDE 2 MG: 2 INJECTION, SOLUTION INTRAMUSCULAR; INTRAVENOUS; SUBCUTANEOUS at 05:09

## 2024-09-18 RX ADMIN — HYDROMORPHONE HYDROCHLORIDE 2 MG: 2 INJECTION, SOLUTION INTRAMUSCULAR; INTRAVENOUS; SUBCUTANEOUS at 09:09

## 2024-09-18 RX ADMIN — OXYCODONE HYDROCHLORIDE 20 MG: 10 TABLET ORAL at 10:09

## 2024-09-18 RX ADMIN — BICTEGRAVIR SODIUM, EMTRICITABINE, AND TENOFOVIR ALAFENAMIDE FUMARATE 1 TABLET: 50; 200; 25 TABLET ORAL at 09:09

## 2024-09-18 RX ADMIN — SENNOSIDES AND DOCUSATE SODIUM 1 TABLET: 50; 8.6 TABLET ORAL at 12:09

## 2024-09-18 RX ADMIN — MECLIZINE HYDROCHLORIDE 25 MG: 25 TABLET ORAL at 06:09

## 2024-09-18 RX ADMIN — HYDROMORPHONE HYDROCHLORIDE 2 MG: 2 INJECTION, SOLUTION INTRAMUSCULAR; INTRAVENOUS; SUBCUTANEOUS at 01:09

## 2024-09-18 RX ADMIN — HYDROMORPHONE HYDROCHLORIDE 2 MG: 2 INJECTION, SOLUTION INTRAMUSCULAR; INTRAVENOUS; SUBCUTANEOUS at 08:09

## 2024-09-18 RX ADMIN — SENNOSIDES AND DOCUSATE SODIUM 1 TABLET: 50; 8.6 TABLET ORAL at 08:09

## 2024-09-18 RX ADMIN — PROCHLORPERAZINE EDISYLATE 5 MG: 5 INJECTION INTRAMUSCULAR; INTRAVENOUS at 05:09

## 2024-09-18 NOTE — PROGRESS NOTES
[facility] - Internal Medicine [facility] Medicine  Progress Note    Patient Name: Tasia Cardona  MRN: [id number]  Patient Class: IP- Inpatient   Admission Date: 9/13/2024  Length of Stay: 2 days  Attending Physician: Stephanie Xavier MD  Primary Care Provider: Pina Jones NP        Subjective:     Principal Problem:Acute on chronic pancreatitis        HPI:  Tasia Cardona is a 25yo M w/ HIV on Biktarvy, hx polycythemia vera c/b splenic vein thrombosis previously on Eliquis, asthma, hx Corinne Jeronimo tear (9/2023), hx of necrotizing pancreatitis attributed to alcohol use c/b chronic pancreatic fluid collections and several acute on chronic pancreatitis episodes who presents to the emergency room with nausea, vomiting, and abdominal pain.  Notes feeling in his usual state of health yesterday.  However, this morning around 4:00 a.m. he was awoken by a sharp, stabbing abdominal pain.  He notes the abdominal pain is mostly in his left upper quadrant; radiated a little bit to the center and lower left quadrant.  It was so severe it caused him to have nausea and vomit.  He was concerned about his emesis as it was brown appearing. He reports no hematemesis, melena, BRBPR.  The abdominal pain is pretty much constant. He denies any fever or chills preceding the abdominal pain, however had some chills after a bout of vomiting.  He was unable to tolerate a bagel this morning.  He has been tolerating small sips of water in the emergency department since arriving this morning around 11.  In terms of diarrhea he reported to the ED, he notes that he had several very small watery bowel movements yesterday; his last good bowel movement was about a week ago.    Of note, patient was recently admitted to Hospital Medicine 7/23-7/29 with similar symptoms. 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  He was evaluated by IR; his collection was deemed too deep and surrounded by too many varices to afford good percutaneous window for drainage.  AES was then consulted; fluid collection was deemed too complex to be drained.  A CTA however was ordered to evaluate for any component of hemorrhagic pancreatitis; CTA was without signs of hemorrhagic pancreatitis.    Today, on admission, potassium mildly low at 3.4, lipase within normal limits, CT abdomen pelvis with IV contrast notable for stable fluid collection along the superior aspect of pancreas suggesting pseudocyst without surrounding inflammation to suggest interval infection; no convincing peripancreatic inflammation.  Early enteritis or developing right colonic constipation was also noted.    Overview/Hospital Course:  Admitted to hospital medicine for management of acute on chronic pancreatitis. Initiated on IV anti-emetics, IV pain control, and IVF. Deferring DVT ppx despite counseling. Due to ongoing, severe pain, ultrasound liver with Doppler ordered to evaluate abdominal venous vasculature.  No obvious venous thrombus visualized.  Lactic acid also within normal limits.    Interval History:  Patient seen and examined at bedside.    Continued abdominal pain, nausea; reports emesis overnight.  Hesitant with any dose reduction or frequency modification today.  Patient updated regarding care plan.      Review of Systems   Constitutional:  Negative for fever.   HENT:  Negative for congestion, sore throat and trouble swallowing.    Eyes:  Negative for visual disturbance.   Respiratory:  Negative for cough, chest tightness and shortness of breath.    Cardiovascular:  Negative for chest pain, palpitations and leg swelling.   Gastrointestinal:  Positive for abdominal pain, constipation, nausea and vomiting.   Genitourinary:  Negative for dysuria.    Musculoskeletal:  Negative for arthralgias and myalgias.   Skin:  Negative for rash.   Neurological:  Negative for dizziness and light-headedness.   Psychiatric/Behavioral:  Negative for confusion and decreased concentration.        Objective:    Temp: 98 °F (36.7 °C) (09/18/24 1138)  Pulse: 85 (09/18/24 1138)  Resp: 18 (09/18/24 1333)  BP: 117/81 (09/18/24 1138)  SpO2: (!) 93 % (09/18/24 1138)    Weight: 65.8 kg (145 lb) (09/13/24 1045)    Body mass index is 20.81 kg/m².      Intake/Output Summary (Last 24 hours) at 9/18/2024 1510  Last data filed at 9/18/2024 1502  Gross per 24 hour   Intake 720 ml   Output 601 ml   Net 119 ml         Physical Exam  Vitals and nursing note reviewed.   Constitutional:       General: He is not in acute distress.     Appearance: He is not ill-appearing, toxic-appearing or diaphoretic.   HENT:      Head: Normocephalic and atraumatic.      Nose: Nose normal.      Mouth/Throat:      Pharynx: Oropharynx is clear.   Eyes:      General: No scleral icterus.  Cardiovascular:      Rate and Rhythm: Normal rate and regular rhythm.      Pulses: Normal pulses.      Heart sounds: Normal heart sounds.   Pulmonary:      Effort: Pulmonary effort is normal.      Breath sounds: Normal breath sounds.   Abdominal:      General: There is distension.      Tenderness: There is abdominal tenderness in the epigastric area, left upper quadrant and left lower quadrant. There is no guarding or rebound.   Musculoskeletal:      Cervical back: Normal range of motion and neck supple.      Right lower leg: No edema.      Left lower leg: No edema.   Skin:     General: Skin is warm and dry.      Capillary Refill: Capillary refill takes less than 2 seconds.   Neurological:      Mental Status: He is alert and oriented to person, place, and time.   Psychiatric:         Behavior: Behavior normal.         Significant Labs: All pertinent labs within the past 24 hours have been reviewed.    No results found for this or any  previous visit (from the past 24 hour(s)).      Significant Imaging: I have reviewed all pertinent imaging results/findings within the past 24 hours.    Imaging Results              CT Abdomen Pelvis With IV Contrast NO Oral Contrast (Final result)  Result time 09/13/24 14:32:21      Final result by Juan Sexton MD (09/13/24 14:32:21)                   Impression:      1. Low attenuating fluid collection along the superior aspect of the pancreas suggests pseudo cyst, similar in appearance to the previous exam.  No surrounding inflammation to suggest interval infection.  No convincing peripancreatic inflammation.  Correlation with laboratory values is advised as CT findings of acute pancreatitis lag behind laboratory abnormality.  2. Findings suggest hepatic steatosis noting hepatomegaly, correlation with LFTs recommended.  3. There are a few scattered fluid-filled small bowel loops, nonspecific.  This may be on the basis of developing right colonic constipation however early enteritis remains a consideration.  Correlation is advised.  4. Please see above for several additional findings.      Electronically signed by: Juan Sexton MD  Date:    09/13/2024  Time:    14:32               Narrative:    EXAMINATION:  CT ABDOMEN PELVIS WITH IV CONTRAST    CLINICAL HISTORY:  Pancreatitis, acute, severe;    TECHNIQUE:  Low dose axial images, sagittal and coronal reformations were obtained from the lung bases to the pubic symphysis following the IV administration of 75 mL of Omnipaque 350 .  Oral contrast was not given.    COMPARISON:  07/24/2024    FINDINGS:  Images of the lower thorax are unremarkable.    The liver is diffusely hypoattenuating suggesting steatosis, correlation with LFTs recommended.  The liver is enlarged.  The spleen, adrenal glands and gallbladder are unremarkable.  The stomach is relatively decompressed, no gastric wall thickening.  The pancreas enhances homogeneously without pancreatic ductal  dilation.  There is a lobular fluid collection superior to the pancreas, similar to the previous examination.  On coronal imaging this measures 6.1 x 3.6 cm suggesting pseudo cyst.  No interval wall thickening or inflammation about the collection to suggest infection.  The portal vein, SMV, celiac axis and SMA all are patent.  There is some irregularity involving the splenic vein noting a few scattered perigastric collaterals.  No significant abdominal lymphadenopathy.    The kidneys enhance symmetrically without hydronephrosis or nephrolithiasis.  The bilateral ureters are unremarkable without calculi seen.  The urinary bladder is nondistended noting there may be some residual wall thickening.  The prostate is not enlarged.    The large bowel is grossly unremarkable noting moderate stool in the right colon.  The terminal ileum and appendix are unremarkable.  There are a few scattered fluid-filled small bowel loops.  There are a few scattered shotty periaortic, pericaval, and mesenteric lymph nodes.  No focal organized pelvic fluid collection.    No acute osseous abnormalities.  No significant inguinal lymphadenopathy.                                        Assessment/Plan:      * Acute on chronic pancreatitis  Nausea and vomiting  Abdominal pain  Fluid collection of pancreas    Lipase 60  Lipid panel added; previously without hypertriglyceridemia - TG 53  Unclear etiology; notably, with PV and history of thrombus, always a consideration; of note, CTA last admission unremarkable  Liver ultrasound with Doppler without obvious thrombus visualized  CT scan on admission with stable fluid collection of pancreas; no convincing peripancreatic inflammation or surrounding inflammation to suggest interval infection  Continue aggressive IVF  Continue IV antiemetics prn  Pain control - continuing IV Dilaudid until able to tolerate p.o. options reliably from intake and pain control perspective; wean frequency this afternoon if  tolerated  Advanced to low saturated fat and low cholesterol diet    Constipation  Concern for overflow diarrhea  Address with bowel regimen; may be contributing to abdominal pain, nausea, vomiting      Hypokalemia  Patient's most recent potassium results are listed below.   Recent Labs     09/16/24  0220 09/17/24  0451   K 3.6 4.1       Plan  - Replete potassium per protocol  - Monitor potassium Daily  - Patient's hypokalemia is worsening. Will continue current treatment      VTE Risk Mitigation (From admission, onward)           Ordered     heparin (porcine) injection 5,000 Units  Every 8 hours         09/13/24 1654     IP VTE HIGH RISK PATIENT  Once         09/13/24 1654     Place sequential compression device  Until discontinued         09/13/24 1654                    Discharge Planning   CASTILLO: 9/20/2024     Code Status: Full Code   Is the patient medically ready for discharge?:     Reason for patient still in hospital (select all that apply): Treatment  Discharge Plan A: Home   Discharge Delays: None known at this time              Stephanie Xavier MD  Department of Hospital Medicine   [facility] - Internal Medicine Telemetry

## 2024-09-18 NOTE — SUBJECTIVE & OBJECTIVE
Interval History:  Patient seen and examined at bedside.    Continued abdominal pain, nausea; reports emesis overnight.  Hesitant with any dose reduction or frequency modification today.  Patient updated regarding care plan.      Review of Systems   Constitutional:  Negative for fever.   HENT:  Negative for congestion, sore throat and trouble swallowing.    Eyes:  Negative for visual disturbance.   Respiratory:  Negative for cough, chest tightness and shortness of breath.    Cardiovascular:  Negative for chest pain, palpitations and leg swelling.   Gastrointestinal:  Positive for abdominal pain, constipation, nausea and vomiting.   Genitourinary:  Negative for dysuria.   Musculoskeletal:  Negative for arthralgias and myalgias.   Skin:  Negative for rash.   Neurological:  Negative for dizziness and light-headedness.   Psychiatric/Behavioral:  Negative for confusion and decreased concentration.        Objective:    Temp: 98 °F (36.7 °C) (09/18/24 1138)  Pulse: 85 (09/18/24 1138)  Resp: 18 (09/18/24 1333)  BP: 117/81 (09/18/24 1138)  SpO2: (!) 93 % (09/18/24 1138)    Weight: 65.8 kg (145 lb) (09/13/24 1045)    Body mass index is 20.81 kg/m².      Intake/Output Summary (Last 24 hours) at 9/18/2024 1510  Last data filed at 9/18/2024 1502  Gross per 24 hour   Intake 720 ml   Output 601 ml   Net 119 ml         Physical Exam  Vitals and nursing note reviewed.   Constitutional:       General: He is not in acute distress.     Appearance: He is not ill-appearing, toxic-appearing or diaphoretic.   HENT:      Head: Normocephalic and atraumatic.      Nose: Nose normal.      Mouth/Throat:      Pharynx: Oropharynx is clear.   Eyes:      General: No scleral icterus.  Cardiovascular:      Rate and Rhythm: Normal rate and regular rhythm.      Pulses: Normal pulses.      Heart sounds: Normal heart sounds.   Pulmonary:      Effort: Pulmonary effort is normal.      Breath sounds: Normal breath sounds.   Abdominal:      General: There is  distension.      Tenderness: There is abdominal tenderness in the epigastric area, left upper quadrant and left lower quadrant. There is no guarding or rebound.   Musculoskeletal:      Cervical back: Normal range of motion and neck supple.      Right lower leg: No edema.      Left lower leg: No edema.   Skin:     General: Skin is warm and dry.      Capillary Refill: Capillary refill takes less than 2 seconds.   Neurological:      Mental Status: He is alert and oriented to person, place, and time.   Psychiatric:         Behavior: Behavior normal.         Significant Labs: All pertinent labs within the past 24 hours have been reviewed.    No results found for this or any previous visit (from the past 24 hour(s)).      Significant Imaging: I have reviewed all pertinent imaging results/findings within the past 24 hours.    Imaging Results              CT Abdomen Pelvis With IV Contrast NO Oral Contrast (Final result)  Result time 09/13/24 14:32:21      Final result by Juan Sexton MD (09/13/24 14:32:21)                   Impression:      1. Low attenuating fluid collection along the superior aspect of the pancreas suggests pseudo cyst, similar in appearance to the previous exam.  No surrounding inflammation to suggest interval infection.  No convincing peripancreatic inflammation.  Correlation with laboratory values is advised as CT findings of acute pancreatitis lag behind laboratory abnormality.  2. Findings suggest hepatic steatosis noting hepatomegaly, correlation with LFTs recommended.  3. There are a few scattered fluid-filled small bowel loops, nonspecific.  This may be on the basis of developing right colonic constipation however early enteritis remains a consideration.  Correlation is advised.  4. Please see above for several additional findings.      Electronically signed by: Juan Sexton MD  Date:    09/13/2024  Time:    14:32               Narrative:    EXAMINATION:  CT ABDOMEN PELVIS WITH IV  CONTRAST    CLINICAL HISTORY:  Pancreatitis, acute, severe;    TECHNIQUE:  Low dose axial images, sagittal and coronal reformations were obtained from the lung bases to the pubic symphysis following the IV administration of 75 mL of Omnipaque 350 .  Oral contrast was not given.    COMPARISON:  07/24/2024    FINDINGS:  Images of the lower thorax are unremarkable.    The liver is diffusely hypoattenuating suggesting steatosis, correlation with LFTs recommended.  The liver is enlarged.  The spleen, adrenal glands and gallbladder are unremarkable.  The stomach is relatively decompressed, no gastric wall thickening.  The pancreas enhances homogeneously without pancreatic ductal dilation.  There is a lobular fluid collection superior to the pancreas, similar to the previous examination.  On coronal imaging this measures 6.1 x 3.6 cm suggesting pseudo cyst.  No interval wall thickening or inflammation about the collection to suggest infection.  The portal vein, SMV, celiac axis and SMA all are patent.  There is some irregularity involving the splenic vein noting a few scattered perigastric collaterals.  No significant abdominal lymphadenopathy.    The kidneys enhance symmetrically without hydronephrosis or nephrolithiasis.  The bilateral ureters are unremarkable without calculi seen.  The urinary bladder is nondistended noting there may be some residual wall thickening.  The prostate is not enlarged.    The large bowel is grossly unremarkable noting moderate stool in the right colon.  The terminal ileum and appendix are unremarkable.  There are a few scattered fluid-filled small bowel loops.  There are a few scattered shotty periaortic, pericaval, and mesenteric lymph nodes.  No focal organized pelvic fluid collection.    No acute osseous abnormalities.  No significant inguinal lymphadenopathy.

## 2024-09-18 NOTE — CONSULTS
NIAS consulted for PVA; pt reports no complaints with current 22g to LFA and declines further access at this time; re-consult if needed

## 2024-09-18 NOTE — ASSESSMENT & PLAN NOTE
Patient's most recent potassium results are listed below.   Recent Labs     09/16/24  0220 09/17/24  0451   K 3.6 4.1       Plan  - Replete potassium per protocol  - Monitor potassium Daily  - Patient's hypokalemia is worsening. Will continue current treatment

## 2024-09-19 LAB
ANION GAP SERPL CALC-SCNC: 9 MMOL/L (ref 8–16)
BASOPHILS # BLD AUTO: 0.04 K/UL (ref 0–0.2)
BASOPHILS NFR BLD: 0.9 % (ref 0–1.9)
BUN SERPL-MCNC: 8 MG/DL (ref 6–20)
CALCIUM SERPL-MCNC: 8.6 MG/DL (ref 8.7–10.5)
CHLORIDE SERPL-SCNC: 105 MMOL/L (ref 95–110)
CO2 SERPL-SCNC: 20 MMOL/L (ref 23–29)
CREAT SERPL-MCNC: 0.7 MG/DL (ref 0.5–1.4)
DIFFERENTIAL METHOD BLD: ABNORMAL
EOSINOPHIL # BLD AUTO: 0.2 K/UL (ref 0–0.5)
EOSINOPHIL NFR BLD: 4.7 % (ref 0–8)
ERYTHROCYTE [DISTWIDTH] IN BLOOD BY AUTOMATED COUNT: 18.6 % (ref 11.5–14.5)
EST. GFR  (NO RACE VARIABLE): >60 ML/MIN/1.73 M^2
GLUCOSE SERPL-MCNC: 95 MG/DL (ref 70–110)
HCT VFR BLD AUTO: 37.5 % (ref 40–54)
HGB BLD-MCNC: 11.1 G/DL (ref 14–18)
IMM GRANULOCYTES # BLD AUTO: 0.01 K/UL (ref 0–0.04)
IMM GRANULOCYTES NFR BLD AUTO: 0.2 % (ref 0–0.5)
LYMPHOCYTES # BLD AUTO: 1.8 K/UL (ref 1–4.8)
LYMPHOCYTES NFR BLD: 40.9 % (ref 18–48)
MAGNESIUM SERPL-MCNC: 2 MG/DL (ref 1.6–2.6)
MCH RBC QN AUTO: 25.2 PG (ref 27–31)
MCHC RBC AUTO-ENTMCNC: 29.6 G/DL (ref 32–36)
MCV RBC AUTO: 85 FL (ref 82–98)
MONOCYTES # BLD AUTO: 0.7 K/UL (ref 0.3–1)
MONOCYTES NFR BLD: 16 % (ref 4–15)
NEUTROPHILS # BLD AUTO: 1.7 K/UL (ref 1.8–7.7)
NEUTROPHILS NFR BLD: 37.3 % (ref 38–73)
NRBC BLD-RTO: 0 /100 WBC
PHOSPHATE SERPL-MCNC: 4.8 MG/DL (ref 2.7–4.5)
PLATELET # BLD AUTO: 176 K/UL (ref 150–450)
PMV BLD AUTO: 10.3 FL (ref 9.2–12.9)
POTASSIUM SERPL-SCNC: 3.7 MMOL/L (ref 3.5–5.1)
RBC # BLD AUTO: 4.41 M/UL (ref 4.6–6.2)
SODIUM SERPL-SCNC: 134 MMOL/L (ref 136–145)
WBC # BLD AUTO: 4.5 K/UL (ref 3.9–12.7)

## 2024-09-19 PROCEDURE — 84100 ASSAY OF PHOSPHORUS: CPT | Performed by: STUDENT IN AN ORGANIZED HEALTH CARE EDUCATION/TRAINING PROGRAM

## 2024-09-19 PROCEDURE — A4216 STERILE WATER/SALINE, 10 ML: HCPCS | Performed by: STUDENT IN AN ORGANIZED HEALTH CARE EDUCATION/TRAINING PROGRAM

## 2024-09-19 PROCEDURE — 36415 COLL VENOUS BLD VENIPUNCTURE: CPT | Performed by: STUDENT IN AN ORGANIZED HEALTH CARE EDUCATION/TRAINING PROGRAM

## 2024-09-19 PROCEDURE — 36410 VNPNXR 3YR/> PHY/QHP DX/THER: CPT

## 2024-09-19 PROCEDURE — 85025 COMPLETE CBC W/AUTO DIFF WBC: CPT | Performed by: STUDENT IN AN ORGANIZED HEALTH CARE EDUCATION/TRAINING PROGRAM

## 2024-09-19 PROCEDURE — 83735 ASSAY OF MAGNESIUM: CPT | Performed by: STUDENT IN AN ORGANIZED HEALTH CARE EDUCATION/TRAINING PROGRAM

## 2024-09-19 PROCEDURE — 80048 BASIC METABOLIC PNL TOTAL CA: CPT | Performed by: STUDENT IN AN ORGANIZED HEALTH CARE EDUCATION/TRAINING PROGRAM

## 2024-09-19 PROCEDURE — C1751 CATH, INF, PER/CENT/MIDLINE: HCPCS

## 2024-09-19 PROCEDURE — 63600175 PHARM REV CODE 636 W HCPCS: Performed by: STUDENT IN AN ORGANIZED HEALTH CARE EDUCATION/TRAINING PROGRAM

## 2024-09-19 PROCEDURE — 94761 N-INVAS EAR/PLS OXIMETRY MLT: CPT

## 2024-09-19 PROCEDURE — 11000001 HC ACUTE MED/SURG PRIVATE ROOM

## 2024-09-19 PROCEDURE — 25000003 PHARM REV CODE 250: Performed by: STUDENT IN AN ORGANIZED HEALTH CARE EDUCATION/TRAINING PROGRAM

## 2024-09-19 RX ORDER — SODIUM CHLORIDE 0.9 % (FLUSH) 0.9 %
10 SYRINGE (ML) INJECTION EVERY 6 HOURS
Status: DISCONTINUED | OUTPATIENT
Start: 2024-09-19 | End: 2024-09-21 | Stop reason: HOSPADM

## 2024-09-19 RX ORDER — SODIUM CHLORIDE 0.9 % (FLUSH) 0.9 %
10 SYRINGE (ML) INJECTION
Status: DISCONTINUED | OUTPATIENT
Start: 2024-09-19 | End: 2024-09-21 | Stop reason: HOSPADM

## 2024-09-19 RX ADMIN — Medication 10 ML: at 12:09

## 2024-09-19 RX ADMIN — Medication 10 ML: at 06:09

## 2024-09-19 RX ADMIN — HYDROMORPHONE HYDROCHLORIDE 2 MG: 2 INJECTION, SOLUTION INTRAMUSCULAR; INTRAVENOUS; SUBCUTANEOUS at 04:09

## 2024-09-19 RX ADMIN — SENNOSIDES AND DOCUSATE SODIUM 1 TABLET: 50; 8.6 TABLET ORAL at 08:09

## 2024-09-19 RX ADMIN — HYDROMORPHONE HYDROCHLORIDE 2 MG: 2 INJECTION, SOLUTION INTRAMUSCULAR; INTRAVENOUS; SUBCUTANEOUS at 12:09

## 2024-09-19 RX ADMIN — BICTEGRAVIR SODIUM, EMTRICITABINE, AND TENOFOVIR ALAFENAMIDE FUMARATE 1 TABLET: 50; 200; 25 TABLET ORAL at 08:09

## 2024-09-19 RX ADMIN — SODIUM CHLORIDE: 9 INJECTION, SOLUTION INTRAVENOUS at 11:09

## 2024-09-19 RX ADMIN — HYDROMORPHONE HYDROCHLORIDE 2 MG: 2 INJECTION, SOLUTION INTRAMUSCULAR; INTRAVENOUS; SUBCUTANEOUS at 09:09

## 2024-09-19 RX ADMIN — HYDROMORPHONE HYDROCHLORIDE 2 MG: 2 INJECTION, SOLUTION INTRAMUSCULAR; INTRAVENOUS; SUBCUTANEOUS at 08:09

## 2024-09-19 RX ADMIN — SENNOSIDES AND DOCUSATE SODIUM 1 TABLET: 50; 8.6 TABLET ORAL at 09:09

## 2024-09-19 RX ADMIN — HYDROMORPHONE HYDROCHLORIDE 2 MG: 2 INJECTION, SOLUTION INTRAMUSCULAR; INTRAVENOUS; SUBCUTANEOUS at 01:09

## 2024-09-19 RX ADMIN — OXYCODONE HYDROCHLORIDE 20 MG: 10 TABLET ORAL at 03:09

## 2024-09-19 RX ADMIN — PROCHLORPERAZINE EDISYLATE 5 MG: 5 INJECTION INTRAMUSCULAR; INTRAVENOUS at 08:09

## 2024-09-19 NOTE — SUBJECTIVE & OBJECTIVE
Interval History:     Patient seen at bedside. He continues to complain of abdominal pain. He had episode of nausea/vomiting this morning.   He does not want to decrease dilaudid dose today. Will readdress in AM.     Review of Systems  Objective:     Vital Signs (Most Recent):  Temp: 98.3 °F (36.8 °C) (09/19/24 1526)  Pulse: 83 (09/19/24 1526)  Resp: 18 (09/19/24 1526)  BP: 112/74 (09/19/24 1526)  SpO2: 97 % (09/19/24 1526) Vital Signs (24h Range):  Temp:  [97.6 °F (36.4 °C)-99.2 °F (37.3 °C)] 98.3 °F (36.8 °C)  Pulse:  [79-92] 83  Resp:  [16-18] 18  SpO2:  [93 %-98 %] 97 %  BP: (110-129)/(70-89) 112/74     Weight: 65.8 kg (145 lb)  Body mass index is 20.81 kg/m².  No intake or output data in the 24 hours ending 09/19/24 1650      Physical Exam  Constitutional:       General: He is not in acute distress.     Appearance: He is not ill-appearing.   Cardiovascular:      Rate and Rhythm: Normal rate.      Pulses: Normal pulses.      Heart sounds: Normal heart sounds.   Pulmonary:      Effort: Pulmonary effort is normal. No respiratory distress.      Breath sounds: Normal breath sounds.   Abdominal:      General: Abdomen is flat. There is no distension.      Palpations: Abdomen is soft.      Tenderness: There is abdominal tenderness.   Musculoskeletal:      Right lower leg: No edema.      Left lower leg: No edema.   Neurological:      General: No focal deficit present.      Mental Status: He is alert and oriented to person, place, and time.   Psychiatric:         Mood and Affect: Mood normal.             Significant Labs: All pertinent labs within the past 24 hours have been reviewed.    Significant Imaging: I have reviewed all pertinent imaging results/findings within the past 24 hours.

## 2024-09-19 NOTE — ASSESSMENT & PLAN NOTE
Nausea and vomiting  Abdominal pain  Fluid collection of pancreas    Lipase 60  Lipid panel added; previously without hypertriglyceridemia - TG 53  Unclear etiology; notably, with PV and history of thrombus, always a consideration; of note, CTA last admission unremarkable  Liver ultrasound with Doppler without obvious thrombus visualized  CT scan on admission with stable fluid collection of pancreas; no convincing peripancreatic inflammation or surrounding inflammation to suggest interval infection  Continue aggressive IVF  Continue IV antiemetics prn  Pain control - continuing IV Dilaudid until able to tolerate p.o. options reliably from intake and pain control perspective; wean dose tomorrow morning  Advanced to low saturated fat and low cholesterol diet

## 2024-09-19 NOTE — PROGRESS NOTES
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  Notes feeling in his usual state of health yesterday.  However, this morning around 4:00 a.m. he was awoken by a sharp, stabbing abdominal pain.  He notes the abdominal pain is mostly in his left upper quadrant; radiated a little bit to the center and lower left quadrant.  It was so severe it caused him to have nausea and vomit.  He was concerned about his emesis as it was brown appearing. He reports no hematemesis, melena, BRBPR.  The abdominal pain is pretty much constant. He denies any fever or chills preceding the abdominal pain, however had some chills after a bout of vomiting.  He was unable to tolerate a bagel this morning.  He has been tolerating small sips of water in the emergency department since arriving this morning around 11.  In terms of diarrhea he reported to the ED, he notes that he had several very small watery bowel movements yesterday; his last good bowel movement was about a week ago.    Of note, patient was recently admitted to Hospital Medicine 7/23-7/29 with similar symptoms. 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  He was evaluated by IR; his collection was deemed too deep and surrounded by too many varices to afford good percutaneous window for drainage.  AES was then consulted; fluid collection was deemed too complex to be drained.  A CTA however was ordered to evaluate for any component of hemorrhagic pancreatitis; CTA was without signs of hemorrhagic pancreatitis.    Today, on admission, potassium mildly low at 3.4, lipase within normal limits, CT abdomen pelvis with IV contrast notable for stable fluid collection along the superior aspect of pancreas suggesting pseudocyst without surrounding inflammation to suggest interval infection; no convincing peripancreatic inflammation.  Early enteritis or developing right colonic constipation was also noted.    Overview/Hospital Course:  Admitted to hospital medicine for management of acute on chronic pancreatitis. Initiated on IV anti-emetics, IV pain control, and IVF. Deferring DVT ppx despite counseling. Due to ongoing, severe pain, ultrasound liver with Doppler ordered to evaluate abdominal venous vasculature.  No obvious venous thrombus visualized.  Lactic acid also within normal limits.    Interval History:     Patient seen at bedside. He continues to complain of abdominal pain. He had episode of nausea/vomiting this morning.   He does not want to decrease dilaudid dose today. Will readdress in AM.     Review of Systems  Objective:     Vital Signs (Most Recent):  Temp: 98.3 °F (36.8 °C) (09/19/24 1526)  Pulse: 83 (09/19/24 1526)  Resp: 18 (09/19/24 1526)  BP: 112/74 (09/19/24 1526)  SpO2: 97 % (09/19/24 1526) Vital Signs (24h Range):  Temp:  [97.6 °F (36.4 °C)-99.2 °F (37.3 °C)] 98.3 °F (36.8 °C)  Pulse:  [79-92] 83  Resp:  [16-18] 18  SpO2:  [93 %-98 %] 97 %  BP: (110-129)/(70-89) 112/74     Weight: 65.8 kg (145 lb)  Body mass index is 20.81 kg/m².  No intake or  output data in the 24 hours ending 09/19/24 1650      Physical Exam  Constitutional:       General: He is not in acute distress.     Appearance: He is not ill-appearing.   Cardiovascular:      Rate and Rhythm: Normal rate.      Pulses: Normal pulses.      Heart sounds: Normal heart sounds.   Pulmonary:      Effort: Pulmonary effort is normal. No respiratory distress.      Breath sounds: Normal breath sounds.   Abdominal:      General: Abdomen is flat. There is no distension.      Palpations: Abdomen is soft.      Tenderness: There is abdominal tenderness.   Musculoskeletal:      Right lower leg: No edema.      Left lower leg: No edema.   Neurological:      General: No focal deficit present.      Mental Status: He is alert and oriented to person, place, and time.   Psychiatric:         Mood and Affect: Mood normal.             Significant Labs: All pertinent labs within the past 24 hours have been reviewed.    Significant Imaging: I have reviewed all pertinent imaging results/findings within the past 24 hours.    Assessment/Plan:      * Acute on chronic pancreatitis  Nausea and vomiting  Abdominal pain  Fluid collection of pancreas    Lipase 60  Lipid panel added; previously without hypertriglyceridemia - TG 53  Unclear etiology; notably, with PV and history of thrombus, always a consideration; of note, CTA last admission unremarkable  Liver ultrasound with Doppler without obvious thrombus visualized  CT scan on admission with stable fluid collection of pancreas; no convincing peripancreatic inflammation or surrounding inflammation to suggest interval infection  Continue aggressive IVF  Continue IV antiemetics prn  Pain control - continuing IV Dilaudid until able to tolerate p.o. options reliably from intake and pain control perspective; wean dose tomorrow morning  Advanced to low saturated fat and low cholesterol diet    Constipation  Concern for overflow diarrhea  Address with bowel regimen; may be contributing to  abdominal pain, nausea, vomiting      Hypokalemia  Patient's most recent potassium results are listed below.   Recent Labs     09/16/24  0220 09/17/24  0451   K 3.6 4.1       Plan  - Replete potassium per protocol  - Monitor potassium Daily  - Patient's hypokalemia is worsening. Will continue current treatment      VTE Risk Mitigation (From admission, onward)           Ordered     heparin (porcine) injection 5,000 Units  Every 8 hours         09/13/24 1654     IP VTE HIGH RISK PATIENT  Once         09/13/24 1654     Place sequential compression device  Until discontinued         09/13/24 1654                    Discharge Planning   CASTILLO: 9/22/2024     Code Status: Full Code   Is the patient medically ready for discharge?:     Reason for patient still in hospital (select all that apply): Patient trending condition  Discharge Plan A: Home   Discharge Delays: None known at this time              Oriana Dozier MD  Department of Hospital Medicine   [facility] - Internal Medicine Telemetry

## 2024-09-19 NOTE — PLAN OF CARE
Problem: Adult Inpatient Plan of Care  Goal: Plan of Care Review  Outcome: Progressing  Goal: Patient-Specific Goal (Individualized)  Outcome: Progressing  Goal: Absence of Hospital-Acquired Illness or Injury  Outcome: Progressing  Goal: Optimal Comfort and Wellbeing  Outcome: Progressing  Goal: Readiness for Transition of Care  Outcome: Progressing     Problem: Pancreatitis  Goal: Fluid and Electrolyte Balance  Outcome: Progressing  Goal: Absence of Infection Signs and Symptoms  Outcome: Progressing  Goal: Optimal Nutrition Delivery  Outcome: Progressing  Goal: Optimal Pain Control and Function  Outcome: Progressing  Goal: Effective Oxygenation and Ventilation  Outcome: Progressing     Problem: Nausea and Vomiting  Goal: Nausea and Vomiting Relief  Outcome: Progressing     Problem: Infection  Goal: Absence of Infection Signs and Symptoms  Outcome: Progressing   Pt is currently in bed, call light within reach, personal belongings at bedside, safety measures in place and instructed to call prior to ambulating. Pt is aaox4, vss, and does not appear to be in any acute distress. Pt verbalizes no concerns or complaints at this time, care is ongoing.

## 2024-09-20 PROCEDURE — 63600175 PHARM REV CODE 636 W HCPCS: Performed by: STUDENT IN AN ORGANIZED HEALTH CARE EDUCATION/TRAINING PROGRAM

## 2024-09-20 PROCEDURE — 11000001 HC ACUTE MED/SURG PRIVATE ROOM

## 2024-09-20 PROCEDURE — 94761 N-INVAS EAR/PLS OXIMETRY MLT: CPT

## 2024-09-20 PROCEDURE — A4216 STERILE WATER/SALINE, 10 ML: HCPCS | Performed by: STUDENT IN AN ORGANIZED HEALTH CARE EDUCATION/TRAINING PROGRAM

## 2024-09-20 PROCEDURE — 25000003 PHARM REV CODE 250: Performed by: STUDENT IN AN ORGANIZED HEALTH CARE EDUCATION/TRAINING PROGRAM

## 2024-09-20 RX ADMIN — SENNOSIDES AND DOCUSATE SODIUM 1 TABLET: 50; 8.6 TABLET ORAL at 08:09

## 2024-09-20 RX ADMIN — HYDROMORPHONE HYDROCHLORIDE 2 MG: 2 INJECTION, SOLUTION INTRAMUSCULAR; INTRAVENOUS; SUBCUTANEOUS at 09:09

## 2024-09-20 RX ADMIN — HYDROMORPHONE HYDROCHLORIDE 2 MG: 2 INJECTION, SOLUTION INTRAMUSCULAR; INTRAVENOUS; SUBCUTANEOUS at 01:09

## 2024-09-20 RX ADMIN — BICTEGRAVIR SODIUM, EMTRICITABINE, AND TENOFOVIR ALAFENAMIDE FUMARATE 1 TABLET: 50; 200; 25 TABLET ORAL at 08:09

## 2024-09-20 RX ADMIN — HYDROMORPHONE HYDROCHLORIDE 2 MG: 2 INJECTION, SOLUTION INTRAMUSCULAR; INTRAVENOUS; SUBCUTANEOUS at 05:09

## 2024-09-20 RX ADMIN — OXYCODONE HYDROCHLORIDE 20 MG: 10 TABLET ORAL at 12:09

## 2024-09-20 RX ADMIN — HYDROMORPHONE HYDROCHLORIDE 2 MG: 2 INJECTION, SOLUTION INTRAMUSCULAR; INTRAVENOUS; SUBCUTANEOUS at 12:09

## 2024-09-20 RX ADMIN — PROCHLORPERAZINE EDISYLATE 5 MG: 5 INJECTION INTRAMUSCULAR; INTRAVENOUS at 12:09

## 2024-09-20 RX ADMIN — Medication 10 ML: at 12:09

## 2024-09-20 RX ADMIN — OXYCODONE HYDROCHLORIDE 20 MG: 10 TABLET ORAL at 04:09

## 2024-09-20 RX ADMIN — Medication 10 ML: at 06:09

## 2024-09-20 RX ADMIN — OXYCODONE HYDROCHLORIDE 15 MG: 10 TABLET ORAL at 08:09

## 2024-09-20 RX ADMIN — OXYCODONE HYDROCHLORIDE 20 MG: 10 TABLET ORAL at 08:09

## 2024-09-20 RX ADMIN — HYDROMORPHONE HYDROCHLORIDE 2 MG: 2 INJECTION, SOLUTION INTRAMUSCULAR; INTRAVENOUS; SUBCUTANEOUS at 10:09

## 2024-09-20 RX ADMIN — OXYCODONE HYDROCHLORIDE 15 MG: 10 TABLET ORAL at 03:09

## 2024-09-20 NOTE — SUBJECTIVE & OBJECTIVE
Interval History:     Reports severe vomiting last night. Discussed weaning off pain meds. He does not feel ready today.    Review of Systems  Objective:     Vital Signs (Most Recent):  Temp: 98 °F (36.7 °C) (09/20/24 1530)  Pulse: 79 (09/20/24 1530)  Resp: 18 (09/20/24 1744)  BP: 123/78 (09/20/24 1530)  SpO2: 97 % (09/20/24 1530) Vital Signs (24h Range):  Temp:  [98 °F (36.7 °C)-98.7 °F (37.1 °C)] 98 °F (36.7 °C)  Pulse:  [73-90] 79  Resp:  [18] 18  SpO2:  [95 %-97 %] 97 %  BP: (112-123)/(68-78) 123/78     Weight: 65.8 kg (145 lb)  Body mass index is 20.81 kg/m².  No intake or output data in the 24 hours ending 09/20/24 1800      Physical Exam  Constitutional:       General: He is not in acute distress.     Appearance: He is not ill-appearing.   Cardiovascular:      Rate and Rhythm: Normal rate.      Heart sounds: Normal heart sounds.   Pulmonary:      Effort: Pulmonary effort is normal. No respiratory distress.      Breath sounds: Normal breath sounds.   Abdominal:      General: Abdomen is flat. There is no distension.      Palpations: Abdomen is soft.      Tenderness: There is abdominal tenderness.   Musculoskeletal:      Right lower leg: No edema.      Left lower leg: No edema.   Neurological:      Mental Status: He is alert and oriented to person, place, and time. Mental status is at baseline.             Significant Labs: All pertinent labs within the past 24 hours have been reviewed.    Significant Imaging: I have reviewed all pertinent imaging results/findings within the past 24 hours.

## 2024-09-20 NOTE — PLAN OF CARE
[facility] - Internal Medicine Telemetry  Discharge Reassessment    Primary Care Provider: Pina Jones NP    Expected Discharge Date: 9/22/2024    Reassessment (most recent)       Discharge Reassessment - 09/20/24 1203          Discharge Reassessment    Assessment Type Discharge Planning Reassessment (P)      Did the patient's condition or plan change since previous assessment? No (P)      Discharge Plan discussed with: Patient (P)      Communicated CASTILLO with patient/caregiver Yes (P)      Discharge Plan A Home with family (P)      Discharge Plan B Home (P)      DME Needed Upon Discharge  none (P)      Transition of Care Barriers None (P)      Why the patient remains in the hospital Requires continued medical care (P)         Post-Acute Status    Post-Acute Authorization Other (P)      Coverage MEDICAID - [facility] - (P)      Other Status Awaiting f/u Appts (P)                  Discharge Plan A and Plan B have been determined by review of patient's clinical status, future medical and therapeutic needs, and coverage/benefits for post-acute care in coordination with multidisciplinary team members.                       [place], HUBER, LMSW  [facility]  Case Management  Ext. [phone]

## 2024-09-20 NOTE — PROGRESS NOTES
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  Notes feeling in his usual state of health yesterday.  However, this morning around 4:00 a.m. he was awoken by a sharp, stabbing abdominal pain.  He notes the abdominal pain is mostly in his left upper quadrant; radiated a little bit to the center and lower left quadrant.  It was so severe it caused him to have nausea and vomit.  He was concerned about his emesis as it was brown appearing. He reports no hematemesis, melena, BRBPR.  The abdominal pain is pretty much constant. He denies any fever or chills preceding the abdominal pain, however had some chills after a bout of vomiting.  He was unable to tolerate a bagel this morning.  He has been tolerating small sips of water in the emergency department since arriving this morning around 11.  In terms of diarrhea he reported to the ED, he notes that he had several very small watery bowel movements yesterday; his last good bowel movement was about a week ago.    Of note, patient was recently admitted to Hospital Medicine 7/23-7/29 with similar symptoms. 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  He was evaluated by IR; his collection was deemed too deep and surrounded by too many varices to afford good percutaneous window for drainage.  AES was then consulted; fluid collection was deemed too complex to be drained.  A CTA however was ordered to evaluate for any component of hemorrhagic pancreatitis; CTA was without signs of hemorrhagic pancreatitis.    Today, on admission, potassium mildly low at 3.4, lipase within normal limits, CT abdomen pelvis with IV contrast notable for stable fluid collection along the superior aspect of pancreas suggesting pseudocyst without surrounding inflammation to suggest interval infection; no convincing peripancreatic inflammation.  Early enteritis or developing right colonic constipation was also noted.    Overview/Hospital Course:  Admitted to hospital medicine for management of acute on chronic pancreatitis. Initiated on IV anti-emetics, IV pain control, and IVF. Deferring DVT ppx despite counseling. Due to ongoing, severe pain, ultrasound liver with Doppler ordered to evaluate abdominal venous vasculature.  No obvious venous thrombus visualized.  Lactic acid also within normal limits.    Interval History:     Reports severe vomiting last night. Discussed weaning off pain meds. He does not feel ready today.    Review of Systems  Objective:     Vital Signs (Most Recent):  Temp: 98 °F (36.7 °C) (09/20/24 1530)  Pulse: 79 (09/20/24 1530)  Resp: 18 (09/20/24 1744)  BP: 123/78 (09/20/24 1530)  SpO2: 97 % (09/20/24 1530) Vital Signs (24h Range):  Temp:  [98 °F (36.7 °C)-98.7 °F (37.1 °C)] 98 °F (36.7 °C)  Pulse:  [73-90] 79  Resp:  [18] 18  SpO2:  [95 %-97 %] 97 %  BP: (112-123)/(68-78) 123/78     Weight: 65.8 kg (145 lb)  Body mass index is 20.81 kg/m².  No intake or output data in the 24 hours ending 09/20/24 1800      Physical Exam  Constitutional:       General: He  is not in acute distress.     Appearance: He is not ill-appearing.   Cardiovascular:      Rate and Rhythm: Normal rate.      Heart sounds: Normal heart sounds.   Pulmonary:      Effort: Pulmonary effort is normal. No respiratory distress.      Breath sounds: Normal breath sounds.   Abdominal:      General: Abdomen is flat. There is no distension.      Palpations: Abdomen is soft.      Tenderness: There is abdominal tenderness.   Musculoskeletal:      Right lower leg: No edema.      Left lower leg: No edema.   Neurological:      Mental Status: He is alert and oriented to person, place, and time. Mental status is at baseline.             Significant Labs: All pertinent labs within the past 24 hours have been reviewed.    Significant Imaging: I have reviewed all pertinent imaging results/findings within the past 24 hours.    Assessment/Plan:      * Acute on chronic pancreatitis  Nausea and vomiting  Abdominal pain  Fluid collection of pancreas    Lipase 60  Lipid panel added; previously without hypertriglyceridemia - TG 53  Unclear etiology; notably, with PV and history of thrombus, always a consideration; of note, CTA last admission unremarkable  Liver ultrasound with Doppler without obvious thrombus visualized  CT scan on admission with stable fluid collection of pancreas; no convincing peripancreatic inflammation or surrounding inflammation to suggest interval infection  Continue aggressive IVF  Continue IV antiemetics prn  Pain control - continuing IV Dilaudid until able to tolerate p.o. options reliably from intake and pain control perspective; wean dose tomorrow morning  Advanced to low saturated fat and low cholesterol diet    Constipation  Concern for overflow diarrhea  Address with bowel regimen; may be contributing to abdominal pain, nausea, vomiting      Hypokalemia  Patient's most recent potassium results are listed below.   Recent Labs     09/16/24  0220 09/17/24  0451   K 3.6 4.1       Plan  - Replete  potassium per protocol  - Monitor potassium Daily  - Patient's hypokalemia is worsening. Will continue current treatment      VTE Risk Mitigation (From admission, onward)           Ordered     heparin (porcine) injection 5,000 Units  Every 8 hours         09/13/24 1654     IP VTE HIGH RISK PATIENT  Once         09/13/24 1654     Place sequential compression device  Until discontinued         09/13/24 1654                    Discharge Planning   CASTILLO: 9/22/2024     Code Status: Full Code   Is the patient medically ready for discharge?:     Reason for patient still in hospital (select all that apply): Patient trending condition  Discharge Plan A: Home with family   Discharge Delays: None known at this time              Oriana Dozier MD  Department of Hospital Medicine   [facility] - Internal Medicine Telemetry

## 2024-09-21 VITALS
RESPIRATION RATE: 18 BRPM | HEIGHT: 70 IN | HEART RATE: 85 BPM | BODY MASS INDEX: 20.76 KG/M2 | TEMPERATURE: 98 F | WEIGHT: 145 LBS | OXYGEN SATURATION: 98 % | SYSTOLIC BLOOD PRESSURE: 119 MMHG | DIASTOLIC BLOOD PRESSURE: 79 MMHG

## 2024-09-21 LAB
ANION GAP SERPL CALC-SCNC: 8 MMOL/L (ref 8–16)
BASOPHILS # BLD AUTO: 0.03 K/UL (ref 0–0.2)
BASOPHILS NFR BLD: 0.6 % (ref 0–1.9)
BUN SERPL-MCNC: 13 MG/DL (ref 6–20)
CALCIUM SERPL-MCNC: 8.9 MG/DL (ref 8.7–10.5)
CHLORIDE SERPL-SCNC: 105 MMOL/L (ref 95–110)
CO2 SERPL-SCNC: 21 MMOL/L (ref 23–29)
CREAT SERPL-MCNC: 0.7 MG/DL (ref 0.5–1.4)
DIFFERENTIAL METHOD BLD: ABNORMAL
EOSINOPHIL # BLD AUTO: 0.2 K/UL (ref 0–0.5)
EOSINOPHIL NFR BLD: 3.7 % (ref 0–8)
ERYTHROCYTE [DISTWIDTH] IN BLOOD BY AUTOMATED COUNT: 18.3 % (ref 11.5–14.5)
EST. GFR  (NO RACE VARIABLE): >60 ML/MIN/1.73 M^2
GLUCOSE SERPL-MCNC: 95 MG/DL (ref 70–110)
HCT VFR BLD AUTO: 38.9 % (ref 40–54)
HGB BLD-MCNC: 11.4 G/DL (ref 14–18)
IMM GRANULOCYTES # BLD AUTO: 0.01 K/UL (ref 0–0.04)
IMM GRANULOCYTES NFR BLD AUTO: 0.2 % (ref 0–0.5)
LYMPHOCYTES # BLD AUTO: 2.2 K/UL (ref 1–4.8)
LYMPHOCYTES NFR BLD: 45.8 % (ref 18–48)
MAGNESIUM SERPL-MCNC: 2.1 MG/DL (ref 1.6–2.6)
MCH RBC QN AUTO: 24.9 PG (ref 27–31)
MCHC RBC AUTO-ENTMCNC: 29.3 G/DL (ref 32–36)
MCV RBC AUTO: 85 FL (ref 82–98)
MONOCYTES # BLD AUTO: 0.8 K/UL (ref 0.3–1)
MONOCYTES NFR BLD: 17.1 % (ref 4–15)
NEUTROPHILS # BLD AUTO: 1.6 K/UL (ref 1.8–7.7)
NEUTROPHILS NFR BLD: 32.6 % (ref 38–73)
NRBC BLD-RTO: 0 /100 WBC
PHOSPHATE SERPL-MCNC: 5.1 MG/DL (ref 2.7–4.5)
PLATELET # BLD AUTO: 198 K/UL (ref 150–450)
PMV BLD AUTO: 10.2 FL (ref 9.2–12.9)
POTASSIUM SERPL-SCNC: 3.7 MMOL/L (ref 3.5–5.1)
RBC # BLD AUTO: 4.57 M/UL (ref 4.6–6.2)
SODIUM SERPL-SCNC: 134 MMOL/L (ref 136–145)
WBC # BLD AUTO: 4.85 K/UL (ref 3.9–12.7)

## 2024-09-21 PROCEDURE — 63600175 PHARM REV CODE 636 W HCPCS: Performed by: STUDENT IN AN ORGANIZED HEALTH CARE EDUCATION/TRAINING PROGRAM

## 2024-09-21 PROCEDURE — 85025 COMPLETE CBC W/AUTO DIFF WBC: CPT | Performed by: STUDENT IN AN ORGANIZED HEALTH CARE EDUCATION/TRAINING PROGRAM

## 2024-09-21 PROCEDURE — 83735 ASSAY OF MAGNESIUM: CPT | Performed by: STUDENT IN AN ORGANIZED HEALTH CARE EDUCATION/TRAINING PROGRAM

## 2024-09-21 PROCEDURE — 80048 BASIC METABOLIC PNL TOTAL CA: CPT | Performed by: STUDENT IN AN ORGANIZED HEALTH CARE EDUCATION/TRAINING PROGRAM

## 2024-09-21 PROCEDURE — 36415 COLL VENOUS BLD VENIPUNCTURE: CPT | Performed by: STUDENT IN AN ORGANIZED HEALTH CARE EDUCATION/TRAINING PROGRAM

## 2024-09-21 PROCEDURE — 25000003 PHARM REV CODE 250: Performed by: STUDENT IN AN ORGANIZED HEALTH CARE EDUCATION/TRAINING PROGRAM

## 2024-09-21 PROCEDURE — A4216 STERILE WATER/SALINE, 10 ML: HCPCS | Performed by: STUDENT IN AN ORGANIZED HEALTH CARE EDUCATION/TRAINING PROGRAM

## 2024-09-21 PROCEDURE — 84100 ASSAY OF PHOSPHORUS: CPT | Performed by: STUDENT IN AN ORGANIZED HEALTH CARE EDUCATION/TRAINING PROGRAM

## 2024-09-21 RX ORDER — BICTEGRAVIR SODIUM, EMTRICITABINE, AND TENOFOVIR ALAFENAMIDE FUMARATE 50; 200; 25 MG/1; MG/1; MG/1
1 TABLET ORAL DAILY
Qty: 30 TABLET | Refills: 1 | Status: SHIPPED | OUTPATIENT
Start: 2024-09-21 | End: 2024-11-20

## 2024-09-21 RX ORDER — OXYCODONE AND ACETAMINOPHEN 10; 325 MG/1; MG/1
1 TABLET ORAL EVERY 8 HOURS PRN
Qty: 9 TABLET | Refills: 0 | Status: SHIPPED | OUTPATIENT
Start: 2024-09-21 | End: 2024-09-24

## 2024-09-21 RX ADMIN — Medication 10 ML: at 06:09

## 2024-09-21 RX ADMIN — Medication 10 ML: at 12:09

## 2024-09-21 RX ADMIN — OXYCODONE HYDROCHLORIDE 20 MG: 10 TABLET ORAL at 12:09

## 2024-09-21 RX ADMIN — OXYCODONE HYDROCHLORIDE 20 MG: 10 TABLET ORAL at 09:09

## 2024-09-21 RX ADMIN — PROCHLORPERAZINE EDISYLATE 5 MG: 5 INJECTION INTRAMUSCULAR; INTRAVENOUS at 12:09

## 2024-09-21 RX ADMIN — SENNOSIDES AND DOCUSATE SODIUM 1 TABLET: 50; 8.6 TABLET ORAL at 08:09

## 2024-09-21 RX ADMIN — HYDROMORPHONE HYDROCHLORIDE 2 MG: 2 INJECTION, SOLUTION INTRAMUSCULAR; INTRAVENOUS; SUBCUTANEOUS at 02:09

## 2024-09-21 RX ADMIN — BICTEGRAVIR SODIUM, EMTRICITABINE, AND TENOFOVIR ALAFENAMIDE FUMARATE 1 TABLET: 50; 200; 25 TABLET ORAL at 08:09

## 2024-09-21 NOTE — NURSING
AAOx4. Discharged home by personal transportation. Educated on discharge, medication, and follow up appt; voiced understanding. Prescription medication delivered to bedside.

## 2024-09-21 NOTE — DISCHARGE INSTRUCTIONS
I refilled your Biktarvy medications. Please follow up with ID to follow your HIV care and refill medications.   Referral placed to follow up with gastroenterology for acute on chronic pancreatitis.  PCP referral is also placed to follow up outpatient.     Please return to ED if you develop worsening abdominal pain, nausea, vomiting.

## 2024-09-21 NOTE — PLAN OF CARE
Patient will d/c to home - no needs. Pt does require a ride - Lyft ordered.    Ride summary.  Kwaku is arriving approx at 3:45pm  [phone]  Natalie Rivers  [id number]     RHONDA FloodN, RN, [facility]  Transitional Care Manager  [phone]  [email or web address]    [facility] - Internal Medicine Telemetry  Discharge Final Note    Primary Care Provider: Pina Jones NP    Expected Discharge Date: 9/21/2024    Final Discharge Note (most recent)       Final Note - 09/21/24 1541          Final Note    Assessment Type Final Discharge Note     Anticipated Discharge Disposition Home or Self Care        Post-Acute Status    Post-Acute Authorization Other     Other Status No Post-Acute Service Needs     Discharge Delays None known at this time                     Important Message from Medicare

## 2024-09-22 NOTE — DISCHARGE SUMMARY
[facility] - Internal Medicine [facility] Medicine  Discharge Summary      Patient Name: Tasia Cardona  MRN: [id number]  HEATHER: [id number]  Patient Class: IP- Inpatient  Admission Date: 9/13/2024  Hospital Length of Stay: 5 days  Discharge Date and Time: 9/21/2024  4:00 PM  Attending Physician: Brittany att. providers found   Discharging Provider: Oriana Dozier MD  Primary Care Provider: Pina Jones NP  Hospital Medicine Team: [facility] MED D Oriana Dozier MD  Primary Care Team: [facility] MED D    HPI:   Tasia Cardona is a 25yo M w/ HIV on Biktarvy, hx polycythemia vera c/b splenic vein thrombosis previously on Eliquis, asthma, hx Corinne Jeronimo tear (9/2023), hx of necrotizing pancreatitis attributed to alcohol use c/b chronic pancreatic fluid collections and several acute on chronic pancreatitis episodes who presents to the emergency room with nausea, vomiting, and abdominal pain.  Notes feeling in his usual state of health yesterday.  However, this morning around 4:00 a.m. he was awoken by a sharp, stabbing abdominal pain.  He notes the abdominal pain is mostly in his left upper quadrant; radiated a little bit to the center and lower left quadrant.  It was so severe it caused him to have nausea and vomit.  He was concerned about his emesis as it was brown appearing. He reports no hematemesis, melena, BRBPR.  The abdominal pain is pretty much constant. He denies any fever or chills preceding the abdominal pain, however had some chills after a bout of vomiting.  He was unable to tolerate a bagel this morning.  He has been tolerating small sips of water in the emergency department since arriving this morning around 11.  In terms of diarrhea he reported to the ED, he notes that he had several very small watery bowel movements yesterday; his last good bowel movement was about a week ago.    Of note, patient was recently admitted to Hospital Medicine 7/23-7/29 with similar symptoms. 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  He was evaluated by IR; his collection was deemed too deep and surrounded by too many varices to afford good percutaneous window for drainage.  AES was then consulted; fluid collection was deemed too complex to be drained.  A CTA however was ordered to evaluate for any component of hemorrhagic pancreatitis; CTA was without signs of hemorrhagic pancreatitis.    Today, on admission, potassium mildly low at 3.4, lipase within normal limits, CT abdomen pelvis with IV contrast notable for stable fluid collection along the superior aspect of pancreas suggesting pseudocyst without surrounding inflammation to suggest interval infection; no convincing peripancreatic inflammation.  Early enteritis or developing right colonic constipation was also noted.    * No surgery found *      Hospital Course:   Admitted to hospital medicine for management of acute on chronic pancreatitis. Initiated on IV anti-emetics, IV pain control, and IVF. Deferring DVT ppx despite counseling. Due to ongoing, severe pain, ultrasound liver with Doppler ordered to evaluate abdominal venous vasculature.  No obvious venous thrombus visualized.  Lactic acid also within normal limits. Patient treated with supportive care with IVF and pain control. Patient symptomatically improved and asking to be discharged. Able to tolerate diet. Medications refilled. Outpatient appointments requested.        Physical Exam  Constitutional:       General: He is not in acute distress.     Appearance: He is not ill-appearing.   Cardiovascular:      Rate and Rhythm: Normal rate.      Heart sounds: Normal heart sounds.   Pulmonary:      Effort: Pulmonary effort is normal. No respiratory distress.      Breath sounds: Normal  breath sounds.   Abdominal:      General: Abdomen is flat. There is no distension.      Palpations: Abdomen is soft.      Tenderness: There is mild abdominal tenderness.   Musculoskeletal:      Right lower leg: No edema.      Left lower leg: No edema.   Neurological:      Mental Status: He is alert and oriented to person, place, and time. Mental status is at baseline.         Goals of Care Treatment Preferences:  Code Status: Full Code      SDOH Screening:  The patient was screened for utility difficulties, food insecurity, transport difficulties, housing insecurity, and interpersonal safety and there were no concerns identified this admission.     Consults:   Consults (From admission, onward)          Status Ordering Provider     Inpatient consult to Midline team  Once        Provider:  (Not yet assigned)    Completed BAHD, SUSANNAH     Inpatient consult to PICC team (NIAS)  Once        Provider:  (Not yet assigned)    Completed BAHD, SUSANNAH     Inpatient consult to PICC team (NIAS)  Once        Provider:  (Not yet assigned)    Completed BAHD, SUSANNAH     Inpatient consult to PICC team (NIAS)  Once        Provider:  (Not yet assigned)    Completed BAHD, SUSANNAH     Inpatient consult to PICC team (NIAS)  Once        Provider:  (Not yet assigned)    Completed BAHD, SUSANNAH            No new Assessment & Plan notes have been filed under this hospital service since the last note was generated.  Service: Hospital Medicine    Final Active Diagnoses:    Diagnosis Date Noted POA    PRINCIPAL PROBLEM:  Acute on chronic pancreatitis [K85.90, K86.1] 12/15/2023 Yes    Constipation [K59.00] 09/13/2024 Yes    Hypokalemia [E87.6] 10/22/2023 Yes    Abdominal pain [R10.9] 10/19/2023 Yes    Fluid collection of pancreas [K86.89] 09/30/2023 Yes    Nausea & vomiting [R11.2] 12/17/2022 Yes      Problems Resolved During this Admission:       Discharged Condition: stable    Disposition: Home or Self Care    Follow Up:    Patient  Instructions:      Ambulatory referral/consult to Infectious Disease   Standing Status: Future   Referral Priority: Routine Referral Type: Consultation   Referral Reason: Specialty Services Required   Requested Specialty: Infectious Diseases   Number of Visits Requested: 1     Ambulatory referral/consult to Gastroenterology   Standing Status: Future   Referral Priority: Routine Referral Type: Consultation   Referral Reason: Specialty Services Required   Requested Specialty: Gastroenterology   Number of Visits Requested: 1     Ambulatory referral/consult to Internal Medicine   Standing Status: Future   Referral Priority: Routine Referral Type: Consultation   Referral Reason: Specialty Services Required   Requested Specialty: Internal Medicine   Number of Visits Requested: 1       Significant Diagnostic Studies: N/A    Pending Diagnostic Studies:       None           Medications:  Reconciled Home Medications:      Medication List        START taking these medications      ENDOCET  mg per tablet  Generic drug: oxyCODONE-acetaminophen  Take 1 tablet by mouth every 8 (eight) hours as needed for Pain.            CONTINUE taking these medications      BIKTARVY -25 mg (25 kg or greater)  Generic drug: qzlsavtnk-fiwibhht-ehkvjwf ala  Take 1 tablet by mouth once daily.     pantoprazole 40 MG tablet  Commonly known as: PROTONIX  Take 1 tablet (40 mg total) by mouth once daily.     promethazine 25 MG tablet  Commonly known as: PHENERGAN  Take 1 tablet (25 mg total) by mouth every 4 (four) hours.              Indwelling Lines/Drains at time of discharge:   Lines/Drains/Airways       None                   Time spent on the discharge of patient: 45 minutes         Oriana Dozier MD  Department of Hospital Medicine  [facility] - Internal Medicine Telemetry

## 2024-09-30 ENCOUNTER — PATIENT MESSAGE (OUTPATIENT)
Dept: HEMATOLOGY/ONCOLOGY | Facility: CLINIC | Age: 24
End: 2024-09-30
Payer: MEDICAID

## 2024-09-30 ENCOUNTER — HOSPITAL ENCOUNTER (OUTPATIENT)
Dept: TRANSFUSION MEDICINE | Facility: HOSPITAL | Age: 24
Discharge: HOME | End: 2024-09-30
Payer: MEDICAID

## 2024-09-30 DIAGNOSIS — D75.1 POLYCYTHEMIA: ICD-10-CM

## 2024-09-30 DIAGNOSIS — D75.1 POLYCYTHEMIA: Primary | ICD-10-CM

## 2024-09-30 PROCEDURE — 99195 PHLEBOTOMY: CPT

## 2024-10-01 PROCEDURE — 99195 PHLEBOTOMY: CPT

## 2024-10-01 NOTE — PROGRESS NOTES
Pt to Blood Bank for therapeutic phlebotomy.  /79  P 100  T 98.4  Hct 39.  1 unit WB removed from  L arm.  Tolerated well.  Written/verbal instructions given.  Pressure wrap applied when hemostasis achieved.  Refreshments accepted.  Ambulatory to and from BB. Dx: Polycythemia

## 2024-10-22 ENCOUNTER — PATIENT MESSAGE (OUTPATIENT)
Dept: RESEARCH | Facility: HOSPITAL | Age: 24
End: 2024-10-22
Payer: MEDICAID

## 2024-11-02 ENCOUNTER — PATIENT MESSAGE (OUTPATIENT)
Dept: ADMINISTRATIVE | Facility: OTHER | Age: 24
End: 2024-11-02
Payer: MEDICAID

## 2024-12-05 DIAGNOSIS — Z21 ASYMPTOMATIC HIV INFECTION, WITH NO HISTORY OF HIV-RELATED ILLNESS: ICD-10-CM

## 2024-12-05 RX ORDER — BICTEGRAVIR SODIUM, EMTRICITABINE, AND TENOFOVIR ALAFENAMIDE FUMARATE 50; 200; 25 MG/1; MG/1; MG/1
1 TABLET ORAL DAILY
Qty: 30 TABLET | Refills: 1 | Status: CANCELLED | OUTPATIENT
Start: 2024-12-02 | End: 2025-01-31

## 2024-12-09 DIAGNOSIS — Z21 ASYMPTOMATIC HIV INFECTION, WITH NO HISTORY OF HIV-RELATED ILLNESS: ICD-10-CM

## 2024-12-09 RX ORDER — BICTEGRAVIR SODIUM, EMTRICITABINE, AND TENOFOVIR ALAFENAMIDE FUMARATE 50; 200; 25 MG/1; MG/1; MG/1
1 TABLET ORAL DAILY
Qty: 30 TABLET | Refills: 1 | Status: ACTIVE | OUTPATIENT
Start: 2024-12-09 | End: 2025-02-07

## 2025-01-11 ENCOUNTER — PATIENT MESSAGE (OUTPATIENT)
Dept: ADMINISTRATIVE | Facility: OTHER | Age: 25
End: 2025-01-11
Payer: MEDICAID

## 2025-01-17 ENCOUNTER — PATIENT MESSAGE (OUTPATIENT)
Dept: INFECTIOUS DISEASES | Facility: CLINIC | Age: 25
End: 2025-01-17
Payer: MEDICAID

## 2025-01-28 ENCOUNTER — PATIENT MESSAGE (OUTPATIENT)
Dept: INFECTIOUS DISEASES | Facility: CLINIC | Age: 25
End: 2025-01-28
Payer: MEDICAID

## 2025-02-09 NOTE — CONSULTS
Percutaneous Drain Placement/Aspiration Consult Note  Interventional Radiology    Consult Requested By: Noah Trinh MD   Reason for Consult: aspiration of pancreatic collection     SUBJECTIVE:     Chief Complaint:  zehra pancreatic collections concerning for abscess    History of Present Illness:  Tasia Cardona is a 23 y.o. 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 Hospital course notable for EUS with AES on 10/23/23, unable to perform cystgastrostomy at that time as fluid collections along pancreas were not suitable for drainage. Pt with uncontrolled abdominal pain since admission despite pain regimen adjustments and abx. New CT a/p completed on 10/29/23 which revealed multiple intraabdominal fluid collections including a multilobulated collection anterior to the pancreatic body and a collection extending cranially between the posterior aspect of the liver and the right diaphragmatic sammy, exhibiting mass effect upon the IVC. Interventional Radiology has been consulted for image guided percutaneous aspiration for management of intraabdominal fluid collections. The pt has been evaluated by surgery and was deemed not a surgical candidate since he has sterile necrosis and no evidence of gastric outlet obstruction or compressive symptoms.The pt's WBC is 4.23 and is stable. Blood cultures on 10/30/23 are NGTD. He is currently afebrile. The pt is hemodynamically stable.       Scheduled Meds:   acetaminophen  1,000 mg Oral Q8H    apixaban  5 mg Oral BID    yqzdfaasr-igdpfwmk-agagojt ala  1 tablet Oral Daily    fluconazole  400 mg Oral Daily    folic acid  1 mg Oral Daily    gabapentin  600 mg Oral TID    lipase-protease-amylase 6,000-19,000-30,000 units  2 capsule Oral TID WM    meropenem (MERREM) IVPB  1 g  Intravenous Q8H    morphine  30 mg Oral Q12H    pantoprazole  40 mg Oral BID     Continuous Infusions:   lactated ringers 100 mL/hr at 11/01/23 0813     PRN Meds:benzocaine, dextrose 10%, dextrose 10%, glucagon (human recombinant), glucose, glucose, HYDROmorphone, hydrOXYzine pamoate, melatonin, naloxone, ondansetron, promethazine (PHENERGAN) 6.25 mg in dextrose 5 % (D5W) 50 mL IVPB, sodium chloride 0.9%, sodium chloride 0.9%    Review of patient's allergies indicates:   Allergen Reactions    [place] Swelling    Pcn [penicillins] Hives     Tolerates cephalosporins    Pecan nut Swelling    Sulfa (sulfonamide antibiotics) Hives       Past Medical History:   Diagnosis Date    Acute necrotizing pancreatitis 09/20/2023    Alcohol use disorder 10/05/2023    Asthma     Hepatitis C antibody positive in blood 09/18/2023 9/2023 PCR negative    HIV infection 10/2021    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     Past Surgical History:   Procedure Laterality Date    ENDOSCOPIC ULTRASOUND OF UPPER GASTROINTESTINAL TRACT N/A 10/23/2023    Procedure: ULTRASOUND, UPPER GI TRACT, ENDOSCOPIC;  Surgeon: Emil Villatoro MD;  Location: [address]);  Service: Endoscopy;  Laterality: N/A;    ERCP N/A 10/23/2023    Procedure: ERCP (ENDOSCOPIC RETROGRADE CHOLANGIOPANCREATOGRAPHY);  Surgeon: Emil Villatoro MD;  Location: [address]);  Service: Endoscopy;  Laterality: N/A;    ESOPHAGOGASTRODUODENOSCOPY N/A 9/18/2023    Procedure: EGD (ESOPHAGOGASTRODUODENOSCOPY);  Surgeon: Kg Cleaning MD;  Location: [address]);  Service: Endoscopy;  Laterality: N/A;     Family History   Problem Relation Age of Onset    Pancreatitis Mother     Cancer Mother     Ovarian cancer Mother     No Known Problems Father     Cancer  Brother     Breast cancer Maternal Grandmother      Social History     Tobacco Use    Smoking status: Former     Types: Cigarettes    Smokeless tobacco: Never   Substance Use Topics    Alcohol use: Not Currently    Drug use: Never       OBJECTIVE:     Vital Signs (Most Recent)  Temp: 98 °F (36.7 °C) (11/01/23 0904)  Pulse: 79 (11/01/23 0904)  Resp: 18 (11/01/23 0904)  BP: 128/75 (11/01/23 0904)  SpO2: (!) 94 % (11/01/23 0904)    Physical Exam:  Physical Exam  Vitals and nursing note reviewed.   Constitutional:       General: He is not in acute distress.     Appearance: Normal appearance. He is not ill-appearing.   HENT:      Head: Normocephalic and atraumatic.      Right Ear: External ear normal.      Left Ear: External ear normal.   Eyes:      Extraocular Movements: Extraocular movements intact.      Conjunctiva/sclera: Conjunctivae normal.      Pupils: Pupils are equal, round, and reactive to light.   Pulmonary:      Effort: Pulmonary effort is normal. No respiratory distress.   Abdominal:      General: Abdomen is flat.      Tenderness: There is abdominal tenderness (generalized).   Musculoskeletal:         General: No swelling.   Skin:     General: Skin is warm and dry.      Coloration: Skin is not jaundiced.   Neurological:      General: No focal deficit present.      Mental Status: He is alert and oriented to person, place, and time.   Psychiatric:         Mood and Affect: Mood normal.         Behavior: Behavior normal.         Thought Content: Thought content normal.         Judgment: Judgment normal.         Laboratory  I have reviewed all pertinent lab results within the past 24 hours.  CBC:   Recent Labs   Lab 11/01/23 0307   WBC 4.23   RBC 3.11*   HGB 8.6*   HCT 28.1*      MCV 90   MCH 27.7   MCHC 30.6*     BMP:   Recent Labs   Lab 11/01/23 0307   GLU 69*      K 3.9      CO2 21*   BUN 5*   CREATININE 0.6   CALCIUM 8.9   MG 1.8     CMP:   Recent Labs   Lab 11/01/23 0307   GLU 69*  "  CALCIUM 8.9   ALBUMIN 2.6*   PROT 6.2      K 3.9   CO2 21*      BUN 5*   CREATININE 0.6   ALKPHOS 73   ALT 7*   AST 14   BILITOT 0.3     LFTs:   Recent Labs   Lab 11/01/23  0307   ALT 7*   AST 14   ALKPHOS 73   BILITOT 0.3   PROT 6.2   ALBUMIN 2.6*     Coagulation: No results for input(s): "LABPROT", "INR", "APTT" in the last 168 hours.  Microbiology Results (last 7 days)       Procedure Component Value Units Date/Time    Blood culture [[id number]] Collected: 10/30/23 1523    Order Status: Completed Specimen: Blood from Antecubital, Right Updated: 10/31/23 1812     Blood Culture, Routine No Growth to date      No Growth to date    Narrative:      Collection has been rescheduled by [email or web address] at 10/30/2023 15:11 Reason:   Patient unavailable in with Drs   Collection has been rescheduled by [email or web address] at 10/30/2023 15:11 Reason:   Patient unavailable in with Drs     Blood culture [[id number]] Collected: 10/30/23 1523    Order Status: Completed Specimen: Blood Updated: 10/31/23 1812     Blood Culture, Routine No Growth to date      No Growth to date    Narrative:      Collection has been rescheduled by [email or web address] at 10/30/2023 15:11 Reason:   Patient unavailable in with Drs   Collection has been rescheduled by [email or web address] at 10/30/2023 15:11 Reason:   Patient unavailable in with Drs     Blood culture [[id number]] Collected: 10/25/23 2004    Order Status: Completed Specimen: Blood Updated: 10/30/23 2212     Blood Culture, Routine No growth after 5 days.    Blood culture [[id number]] Collected: 10/25/23 1938    Order Status: Completed Specimen: Blood Updated: 10/30/23 2022     Blood Culture, Routine No growth after 5 days.            ASA/Mallampati  ASA: 2  Mallampati: 2    Imaging:  Recent imaging studies including CT a/p on 10/29/23 which was independently reviewed by Adolfo Cadet MD.     EXAMINATION:  CT ABDOMEN PELVIS WITH IV CONTRAST     CLINICAL HISTORY:  Abdominal pain, acute, nonlocalized;     TECHNIQUE:  Low dose axial " images, sagittal and coronal reformations were obtained from the lung bases to the pubic symphysis following the IV administration of 100 mL of Omnipaque 350 .  Oral contrast was not given.     COMPARISON:  Radiographs 10/27/2023, CT 10/22/2023     FINDINGS:  There are small bilateral pleural effusions, right greater than left.  There is no pericardial effusion.  Bibasilar atelectasis noted.     There is reidentification of necrotizing pancreatitis with multilocular fluid collection arising from the pancreatic body.  Dominant component in the central abdomen measures 5.8 x 6.0 x 6.9 cm (previously 6.7 by 5.8 by 7.4 cm) and exhibits peripheral enhancement.  There is also contiguous irregular component along the pancreatic body and tail, similar to prior study.  Furthermore, there is a component extending cranially between the posterior aspect of the liver and the right diaphragmatic sammy, exhibiting mass effect upon the IVC and new from prior study.  Portal vein is patent.  Multiple smaller vessels are seen in the expected location of the splenic vein, perhaps thrombosis with collateralization, similar to prior study.     Liver is otherwise unremarkable.  Pericholecystic fluid, likely reactive secondary to adjacent process.  No biliary dilatation.  Spleen is enlarged, measuring 13.2 x 5.1 cm.  Adrenal glands and kidneys are unremarkable.     GI tract demonstrates no evidence for obstruction or inflammation.  Appendix is normal caliber.     No retroperitoneal lymphadenopathy, noting multiple subcentimeter lymph nodes.  There is a small volume of ascites.     Urinary bladder is unremarkable.  Prostate is unremarkable.     Regional osseous structures demonstrate no aggressive appearing lytic or blastic lesions.     Impression:     1. Sequela of pancreatitis with large multilocular fluid collection contiguous with the pancreatic body and tail.  While the bulk of the collection appears similar to prior study, there is a  new component extending cranially between the posterior aspect of the liver and diaphragmatic sammy, exhibiting mass effect upon the IVC.  2. 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  3. Stable small bilateral pleural effusions with bibasilar atelectasis.        Electronically signed by: Kg Hodges MD  Date:                                            10/29/2023  Time:                                           13:48    ASSESSMENT/PLAN:     Assessment:  23 y.o. male with a PMHx of acute necrotizing pancreatitis with associated peripancreatic collections who has been referred to IR for image guided percutaneous aspiration for management of his intraabdominal collections. Imaging reviewed by staff. As previously mentioned in IR consult dated 10/30/23, unable to aspirate collection which is exhibiting mass effect on the IVC as there is no safe window for drainage. The collection anterior to the pancreas does appear amenable to aspiration although it does not communicate with the collection exhibiting mass effect which is most likely causing his symptoms of uncontrolled pain. Primary team would like to proceed with aspiration of this collection anterior to the pancreas. The procedure was discussed in great detail with the patient including thorough explanations of the potential risks and benefits of percutaneous drain placement/aspiration. Risks include sepsis, severe infection, hemorrhage, damage to surrounding structures, catheter malfunction, inability to remove catheter, and catheter dislodgment. The patient  a candidate for CT guided percutaneous drain aspiration of the collection anterior to the pancreas under moderate sedation. Plan discussed with ordering physician.The pt verbalized understanding of the plan and would like to proceed.    Plan:  Will proceed with CT guided percutaneous aspiration under moderate sedation on 11/2/23.   Please keep pt NPO starting at midnight on  11/2/23.   Primary team to order any labs/cultures to be drawn on fluid sample collected during the procedure.  Anticoagulation history reviewed. Pt on eliquis, no need to hold per staff  Coagulation labs reviewed.  Thank you for the consult. Please contact with questions via [employer] secure chat or [employer]    Deanna Gay PA-C  Interventional Radiology  Spectra: [phone]    pregnant

## 2025-02-17 DIAGNOSIS — Z21 ASYMPTOMATIC HIV INFECTION, WITH NO HISTORY OF HIV-RELATED ILLNESS: ICD-10-CM

## 2025-02-17 RX ORDER — BICTEGRAVIR SODIUM, EMTRICITABINE, AND TENOFOVIR ALAFENAMIDE FUMARATE 50; 200; 25 MG/1; MG/1; MG/1
1 TABLET ORAL DAILY
Qty: 30 TABLET | Refills: 1 | Status: ACTIVE | OUTPATIENT
Start: 2025-02-17 | End: 2025-04-18

## 2025-03-28 ENCOUNTER — HOSPITAL ENCOUNTER (EMERGENCY)
Facility: HOSPITAL | Age: 25
Discharge: HOME | End: 2025-03-29
Attending: STUDENT IN AN ORGANIZED HEALTH CARE EDUCATION/TRAINING PROGRAM
Payer: MEDICAID

## 2025-03-28 DIAGNOSIS — R10.9 ABDOMINAL PAIN, UNSPECIFIED ABDOMINAL LOCATION: Primary | ICD-10-CM

## 2025-03-28 LAB
ABSOLUTE EOSINOPHIL (OHS): 0.2 K/UL
ABSOLUTE MONOCYTE (OHS): 0.57 K/UL (ref 0.3–1)
ABSOLUTE NEUTROPHIL COUNT (OHS): 2.06 K/UL (ref 1.8–7.7)
ALBUMIN SERPL BCP-MCNC: 3.5 G/DL (ref 3.5–5.2)
ALP SERPL-CCNC: 108 UNIT/L (ref 40–150)
ALT SERPL W/O P-5'-P-CCNC: 126 UNIT/L (ref 10–44)
ANION GAP (OHS): 13 MMOL/L (ref 8–16)
AST SERPL-CCNC: 132 UNIT/L (ref 11–45)
BASOPHILS # BLD AUTO: 0.05 K/UL
BASOPHILS NFR BLD AUTO: 0.8 %
BILIRUB SERPL-MCNC: 0.2 MG/DL (ref 0.1–1)
BUN SERPL-MCNC: 4 MG/DL (ref 6–20)
CALCIUM SERPL-MCNC: 7.7 MG/DL (ref 8.7–10.5)
CHLORIDE SERPL-SCNC: 112 MMOL/L (ref 95–110)
CO2 SERPL-SCNC: 14 MMOL/L (ref 23–29)
CREAT SERPL-MCNC: 0.7 MG/DL (ref 0.5–1.4)
ERYTHROCYTE [DISTWIDTH] IN BLOOD BY AUTOMATED COUNT: 18.2 % (ref 11.5–14.5)
GFR SERPLBLD CREATININE-BSD FMLA CKD-EPI: >60 ML/MIN/1.73/M2
GLUCOSE SERPL-MCNC: 112 MG/DL (ref 70–110)
HCT VFR BLD AUTO: 43.2 % (ref 40–54)
HGB BLD-MCNC: 12.9 GM/DL (ref 14–18)
IMM GRANULOCYTES # BLD AUTO: 0.02 K/UL (ref 0–0.04)
IMM GRANULOCYTES NFR BLD AUTO: 0.3 % (ref 0–0.5)
LACTATE SERPL-SCNC: 1.6 MMOL/L (ref 0.5–2.2)
LIPASE SERPL-CCNC: 12 U/L (ref 4–60)
LYMPHOCYTES # BLD AUTO: 3.06 K/UL (ref 1–4.8)
MCH RBC QN AUTO: 23.9 PG (ref 27–50)
MCHC RBC AUTO-ENTMCNC: 29.9 G/DL (ref 32–36)
MCV RBC AUTO: 80 FL (ref 82–98)
NUCLEATED RBC (/100WBC) (OHS): 0 /100 WBC
PLATELET # BLD AUTO: 292 K/UL (ref 150–450)
PMV BLD AUTO: 10.4 FL (ref 9.2–12.9)
POTASSIUM SERPL-SCNC: 3.9 MMOL/L (ref 3.5–5.1)
PROT SERPL-MCNC: 7.3 GM/DL (ref 6–8.4)
RBC # BLD AUTO: 5.39 M/UL (ref 4.6–6.2)
RELATIVE EOSINOPHIL (OHS): 3.4 %
RELATIVE LYMPHOCYTE (OHS): 51.3 % (ref 18–48)
RELATIVE MONOCYTE (OHS): 9.6 % (ref 4–15)
RELATIVE NEUTROPHIL (OHS): 34.6 % (ref 38–73)
SODIUM SERPL-SCNC: 139 MMOL/L (ref 136–145)
WBC # BLD AUTO: 5.96 K/UL (ref 3.9–12.7)

## 2025-03-28 PROCEDURE — 83605 ASSAY OF LACTIC ACID: CPT | Performed by: PHYSICIAN ASSISTANT

## 2025-03-28 PROCEDURE — 96375 TX/PRO/DX INJ NEW DRUG ADDON: CPT

## 2025-03-28 PROCEDURE — 83690 ASSAY OF LIPASE: CPT | Performed by: STUDENT IN AN ORGANIZED HEALTH CARE EDUCATION/TRAINING PROGRAM

## 2025-03-28 PROCEDURE — 96365 THER/PROPH/DIAG IV INF INIT: CPT

## 2025-03-28 PROCEDURE — 63600175 PHARM REV CODE 636 W HCPCS: Mod: JZ,TB | Performed by: PHYSICIAN ASSISTANT

## 2025-03-28 PROCEDURE — 85025 COMPLETE CBC W/AUTO DIFF WBC: CPT | Performed by: EMERGENCY MEDICINE

## 2025-03-28 PROCEDURE — 63600175 PHARM REV CODE 636 W HCPCS: Performed by: EMERGENCY MEDICINE

## 2025-03-28 PROCEDURE — 96361 HYDRATE IV INFUSION ADD-ON: CPT

## 2025-03-28 PROCEDURE — 25000003 PHARM REV CODE 250: Performed by: EMERGENCY MEDICINE

## 2025-03-28 PROCEDURE — 80053 COMPREHEN METABOLIC PANEL: CPT | Performed by: STUDENT IN AN ORGANIZED HEALTH CARE EDUCATION/TRAINING PROGRAM

## 2025-03-28 PROCEDURE — 99284 EMERGENCY DEPT VISIT MOD MDM: CPT | Mod: 25

## 2025-03-28 RX ORDER — HYDROMORPHONE HYDROCHLORIDE 1 MG/ML
1 INJECTION, SOLUTION INTRAMUSCULAR; INTRAVENOUS; SUBCUTANEOUS
Refills: 0 | Status: DISCONTINUED | OUTPATIENT
Start: 2025-03-28 | End: 2025-03-28

## 2025-03-28 RX ORDER — HYDROMORPHONE HYDROCHLORIDE 1 MG/ML
2 INJECTION, SOLUTION INTRAMUSCULAR; INTRAVENOUS; SUBCUTANEOUS
Status: COMPLETED | OUTPATIENT
Start: 2025-03-28 | End: 2025-03-28

## 2025-03-28 RX ORDER — PANTOPRAZOLE SODIUM 40 MG/10ML
40 INJECTION, POWDER, LYOPHILIZED, FOR SOLUTION INTRAVENOUS
Status: COMPLETED | OUTPATIENT
Start: 2025-03-28 | End: 2025-03-28

## 2025-03-28 RX ADMIN — HYDROMORPHONE HYDROCHLORIDE 2 MG: 1 INJECTION, SOLUTION INTRAMUSCULAR; INTRAVENOUS; SUBCUTANEOUS at 10:03

## 2025-03-28 RX ADMIN — SODIUM CHLORIDE 1000 ML: 9 INJECTION, SOLUTION INTRAVENOUS at 10:03

## 2025-03-28 RX ADMIN — PANTOPRAZOLE SODIUM 40 MG: 40 INJECTION, POWDER, LYOPHILIZED, FOR SOLUTION INTRAVENOUS at 10:03

## 2025-03-28 RX ADMIN — PROMETHAZINE HYDROCHLORIDE 12.5 MG: 25 INJECTION INTRAMUSCULAR; INTRAVENOUS at 10:03

## 2025-03-29 ENCOUNTER — HOSPITAL ENCOUNTER (EMERGENCY)
Facility: HOSPITAL | Age: 25
Discharge: HOME | End: 2025-03-29
Attending: EMERGENCY MEDICINE
Payer: MEDICAID

## 2025-03-29 VITALS
HEART RATE: 88 BPM | SYSTOLIC BLOOD PRESSURE: 118 MMHG | DIASTOLIC BLOOD PRESSURE: 68 MMHG | RESPIRATION RATE: 18 BRPM | TEMPERATURE: 98 F | OXYGEN SATURATION: 98 %

## 2025-03-29 VITALS
HEART RATE: 88 BPM | DIASTOLIC BLOOD PRESSURE: 106 MMHG | TEMPERATURE: 99 F | SYSTOLIC BLOOD PRESSURE: 155 MMHG | BODY MASS INDEX: 22.19 KG/M2 | RESPIRATION RATE: 20 BRPM | WEIGHT: 155 LBS | OXYGEN SATURATION: 96 % | HEIGHT: 70 IN

## 2025-03-29 DIAGNOSIS — R07.9 CHEST PAIN: ICD-10-CM

## 2025-03-29 DIAGNOSIS — K86.1 ACUTE ON CHRONIC PANCREATITIS: Primary | ICD-10-CM

## 2025-03-29 DIAGNOSIS — K85.90 ACUTE ON CHRONIC PANCREATITIS: Primary | ICD-10-CM

## 2025-03-29 PROBLEM — I81 PORTAL VEIN THROMBOSIS: Status: ACTIVE | Noted: 2025-03-29

## 2025-03-29 LAB
ABSOLUTE EOSINOPHIL (OHS): 0.11 K/UL
ABSOLUTE MONOCYTE (OHS): 0.59 K/UL (ref 0.3–1)
ABSOLUTE NEUTROPHIL COUNT (OHS): 3.95 K/UL (ref 1.8–7.7)
ALBUMIN SERPL BCP-MCNC: 3.3 G/DL (ref 3.5–5.2)
ALP SERPL-CCNC: 101 UNIT/L (ref 40–150)
ALT SERPL W/O P-5'-P-CCNC: 121 UNIT/L (ref 10–44)
ANION GAP (OHS): 8 MMOL/L (ref 8–16)
AST SERPL-CCNC: 138 UNIT/L (ref 11–45)
BASOPHILS # BLD AUTO: 0.04 K/UL
BASOPHILS NFR BLD AUTO: 0.6 %
BILIRUB SERPL-MCNC: 0.5 MG/DL (ref 0.1–1)
BILIRUB UR QL STRIP.AUTO: NEGATIVE
BUN SERPL-MCNC: 8 MG/DL (ref 6–20)
CALCIUM SERPL-MCNC: 7.9 MG/DL (ref 8.7–10.5)
CHLORIDE SERPL-SCNC: 104 MMOL/L (ref 95–110)
CLARITY UR: CLEAR
CO2 SERPL-SCNC: 23 MMOL/L (ref 23–29)
COLOR UR AUTO: YELLOW
CREAT SERPL-MCNC: 0.7 MG/DL (ref 0.5–1.4)
ERYTHROCYTE [DISTWIDTH] IN BLOOD BY AUTOMATED COUNT: 18.6 % (ref 11.5–14.5)
GFR SERPLBLD CREATININE-BSD FMLA CKD-EPI: >60 ML/MIN/1.73/M2
GLUCOSE SERPL-MCNC: 102 MG/DL (ref 70–110)
GLUCOSE UR QL STRIP: NEGATIVE
HCT VFR BLD AUTO: 43.2 % (ref 40–54)
HGB BLD-MCNC: 12.6 GM/DL (ref 14–18)
HGB UR QL STRIP: NEGATIVE
IMM GRANULOCYTES # BLD AUTO: 0.01 K/UL (ref 0–0.04)
IMM GRANULOCYTES NFR BLD AUTO: 0.2 % (ref 0–0.5)
KETONES UR QL STRIP: NEGATIVE
LACTATE SERPL-SCNC: 1.6 MMOL/L (ref 0.5–2.2)
LEUKOCYTE ESTERASE UR QL STRIP: NEGATIVE
LIPASE SERPL-CCNC: 86 U/L (ref 4–60)
LYMPHOCYTES # BLD AUTO: 1.49 K/UL (ref 1–4.8)
MCH RBC QN AUTO: 23.9 PG (ref 27–50)
MCHC RBC AUTO-ENTMCNC: 29.2 G/DL (ref 32–36)
MCV RBC AUTO: 82 FL (ref 82–98)
NITRITE UR QL STRIP: NEGATIVE
NUCLEATED RBC (/100WBC) (OHS): 0 /100 WBC
PH UR STRIP: 6 [PH]
PLATELET # BLD AUTO: 200 K/UL (ref 150–450)
PMV BLD AUTO: 10.9 FL (ref 9.2–12.9)
POTASSIUM SERPL-SCNC: 3.9 MMOL/L (ref 3.5–5.1)
PROT SERPL-MCNC: 6.5 GM/DL (ref 6–8.4)
PROT UR QL STRIP: ABNORMAL
RBC # BLD AUTO: 5.27 M/UL (ref 4.6–6.2)
RELATIVE EOSINOPHIL (OHS): 1.8 %
RELATIVE LYMPHOCYTE (OHS): 24.1 % (ref 18–48)
RELATIVE MONOCYTE (OHS): 9.5 % (ref 4–15)
RELATIVE NEUTROPHIL (OHS): 63.8 % (ref 38–73)
SODIUM SERPL-SCNC: 135 MMOL/L (ref 136–145)
SP GR UR STRIP: >=1.03
TROPONIN I SERPL HS-MCNC: <3 NG/L
UROBILINOGEN UR STRIP-ACNC: NEGATIVE EU/DL
WBC # BLD AUTO: 6.19 K/UL (ref 3.9–12.7)

## 2025-03-29 PROCEDURE — 83690 ASSAY OF LIPASE: CPT | Performed by: EMERGENCY MEDICINE

## 2025-03-29 PROCEDURE — 85025 COMPLETE CBC W/AUTO DIFF WBC: CPT | Performed by: PHYSICIAN ASSISTANT

## 2025-03-29 PROCEDURE — 63600175 PHARM REV CODE 636 W HCPCS: Mod: JZ,TB | Performed by: EMERGENCY MEDICINE

## 2025-03-29 PROCEDURE — 80047 BASIC METABLC PNL IONIZED CA: CPT

## 2025-03-29 PROCEDURE — 96374 THER/PROPH/DIAG INJ IV PUSH: CPT

## 2025-03-29 PROCEDURE — 96361 HYDRATE IV INFUSION ADD-ON: CPT

## 2025-03-29 PROCEDURE — 93005 ELECTROCARDIOGRAM TRACING: CPT

## 2025-03-29 PROCEDURE — 99285 EMERGENCY DEPT VISIT HI MDM: CPT | Mod: 25

## 2025-03-29 PROCEDURE — 93010 ELECTROCARDIOGRAM REPORT: CPT | Mod: ,,, | Performed by: INTERNAL MEDICINE

## 2025-03-29 PROCEDURE — 83605 ASSAY OF LACTIC ACID: CPT | Performed by: PHYSICIAN ASSISTANT

## 2025-03-29 PROCEDURE — 80053 COMPREHEN METABOLIC PANEL: CPT | Performed by: EMERGENCY MEDICINE

## 2025-03-29 PROCEDURE — 96376 TX/PRO/DX INJ SAME DRUG ADON: CPT

## 2025-03-29 PROCEDURE — 96375 TX/PRO/DX INJ NEW DRUG ADDON: CPT

## 2025-03-29 PROCEDURE — 25000003 PHARM REV CODE 250: Performed by: PHYSICIAN ASSISTANT

## 2025-03-29 PROCEDURE — 81003 URINALYSIS AUTO W/O SCOPE: CPT | Performed by: PHYSICIAN ASSISTANT

## 2025-03-29 PROCEDURE — 63600175 PHARM REV CODE 636 W HCPCS: Performed by: PHYSICIAN ASSISTANT

## 2025-03-29 PROCEDURE — 84484 ASSAY OF TROPONIN QUANT: CPT | Performed by: PHYSICIAN ASSISTANT

## 2025-03-29 RX ORDER — HYDROMORPHONE HYDROCHLORIDE 1 MG/ML
2 INJECTION, SOLUTION INTRAMUSCULAR; INTRAVENOUS; SUBCUTANEOUS
Refills: 0 | Status: DISCONTINUED | OUTPATIENT
Start: 2025-03-29 | End: 2025-03-29

## 2025-03-29 RX ORDER — PROMETHAZINE HYDROCHLORIDE 25 MG/1
25 TABLET ORAL EVERY 6 HOURS PRN
Qty: 15 TABLET | Refills: 0 | Status: ON HOLD | OUTPATIENT
Start: 2025-03-29

## 2025-03-29 RX ORDER — OXYCODONE HYDROCHLORIDE 5 MG/1
5 TABLET ORAL EVERY 4 HOURS PRN
Qty: 12 TABLET | Refills: 0 | Status: ON HOLD | OUTPATIENT
Start: 2025-03-29 | End: 2025-04-01

## 2025-03-29 RX ORDER — HYDROMORPHONE HYDROCHLORIDE 2 MG/ML
2 INJECTION, SOLUTION INTRAMUSCULAR; INTRAVENOUS; SUBCUTANEOUS
Refills: 0 | Status: COMPLETED | OUTPATIENT
Start: 2025-03-29 | End: 2025-03-29

## 2025-03-29 RX ORDER — HYDROMORPHONE HYDROCHLORIDE 2 MG/ML
1 INJECTION, SOLUTION INTRAMUSCULAR; INTRAVENOUS; SUBCUTANEOUS
Refills: 0 | Status: COMPLETED | OUTPATIENT
Start: 2025-03-29 | End: 2025-03-29

## 2025-03-29 RX ORDER — HYDROMORPHONE HYDROCHLORIDE 1 MG/ML
2 INJECTION, SOLUTION INTRAMUSCULAR; INTRAVENOUS; SUBCUTANEOUS
Refills: 0 | Status: DISCONTINUED | OUTPATIENT
Start: 2025-03-29 | End: 2025-03-29 | Stop reason: SDUPTHER

## 2025-03-29 RX ORDER — ONDANSETRON 4 MG/1
4 TABLET, ORALLY DISINTEGRATING ORAL EVERY 6 HOURS PRN
Qty: 15 TABLET | Refills: 0 | Status: ON HOLD | OUTPATIENT
Start: 2025-03-29 | End: 2025-04-04

## 2025-03-29 RX ORDER — ONDANSETRON HYDROCHLORIDE 2 MG/ML
4 INJECTION, SOLUTION INTRAVENOUS
Status: COMPLETED | OUTPATIENT
Start: 2025-03-29 | End: 2025-03-29

## 2025-03-29 RX ORDER — PANTOPRAZOLE SODIUM 40 MG/10ML
40 INJECTION, POWDER, LYOPHILIZED, FOR SOLUTION INTRAVENOUS
Status: COMPLETED | OUTPATIENT
Start: 2025-03-29 | End: 2025-03-29

## 2025-03-29 RX ORDER — HYDROMORPHONE HYDROCHLORIDE 1 MG/ML
1 INJECTION, SOLUTION INTRAMUSCULAR; INTRAVENOUS; SUBCUTANEOUS
Refills: 0 | Status: DISCONTINUED | OUTPATIENT
Start: 2025-03-29 | End: 2025-03-29

## 2025-03-29 RX ORDER — FAMOTIDINE 10 MG/ML
20 INJECTION, SOLUTION INTRAVENOUS
Status: COMPLETED | OUTPATIENT
Start: 2025-03-29 | End: 2025-03-29

## 2025-03-29 RX ADMIN — HYDROMORPHONE HYDROCHLORIDE 2 MG: 2 INJECTION, SOLUTION INTRAMUSCULAR; INTRAVENOUS; SUBCUTANEOUS at 02:03

## 2025-03-29 RX ADMIN — PANTOPRAZOLE SODIUM 40 MG: 40 INJECTION, POWDER, LYOPHILIZED, FOR SOLUTION INTRAVENOUS at 04:03

## 2025-03-29 RX ADMIN — HYDROMORPHONE HYDROCHLORIDE 1 MG: 2 INJECTION, SOLUTION INTRAMUSCULAR; INTRAVENOUS; SUBCUTANEOUS at 03:03

## 2025-03-29 RX ADMIN — HYDROMORPHONE HYDROCHLORIDE 2 MG: 2 INJECTION, SOLUTION INTRAMUSCULAR; INTRAVENOUS; SUBCUTANEOUS at 05:03

## 2025-03-29 RX ADMIN — ONDANSETRON 4 MG: 2 INJECTION INTRAMUSCULAR; INTRAVENOUS at 01:03

## 2025-03-29 RX ADMIN — FAMOTIDINE 20 MG: 10 INJECTION, SOLUTION INTRAVENOUS at 03:03

## 2025-03-29 NOTE — FIRST PROVIDER EVALUATION
Medical screening examination initiated.  I have conducted a focused provider triage encounter, findings are as follows:    Brief history of present illness:  Pt with longstanding history of pancreatitis with similar pain    There were no vitals filed for this visit.    Pertinent physical exam:  epigastric abd pain    Brief workup plan:  pancreatitis workup, pain pills, IVF    Preliminary workup initiated; this workup will be continued and followed by the physician or advanced practice provider that is assigned to the patient when roomed.

## 2025-03-29 NOTE — ED TRIAGE NOTES
Ptc/o abd pain that began yesterday, it is much worse today and states he has been throwing up blood all day. Pain starts at LUQ and radiates across to the RLQ. Pt has hx of pancreatitis and states he feels like this is a flare up.

## 2025-03-29 NOTE — PROGRESS NOTES
Abdomen pelvis w/o obtained iv contrast attempted through lt ac 18g 75cc infiltrated evaluated by dr ted enrique 1600 no pain on site. Mild bruising and swelling. Pt stated has hard veins iv was put in by US. NURSE AND PHYS NOTIFIED

## 2025-03-29 NOTE — ED NOTES
Patient identifiers verified and correct for Tasia Cardona  LOC: The patient is awake, alert and aware of environment with an appropriate affect, the patient is oriented x 3 and speaking appropriately.   APPEARANCE: Patient appears comfortable and in no acute distress, patient is clean and well groomed.  SKIN: The skin is warm and dry, color consistent with ethnicity, patient has normal skin turgor and moist mucus membranes, skin intact, no breakdown or bruising noted.   MUSCULOSKELETAL: Patient moving all extremities spontaneously, no swelling noted.  RESPIRATORY: Airway is open and patent, respirations are spontaneous, patient has a normal effort and rate, no accessory muscle use noted, O2 Sat 97% on room air.  CARDIAC: Patient has a normal rate and regular rhythm, no edema noted, capillary refill < 3 seconds.   GASTRO: Soft and non tender to palpation, no distention noted, Pt states bowel movements have been regular. Pt reports abdominal pain.  : Pt denies any pain or frequency with urination.  NEURO: Pt opens eyes spontaneously, behavior appropriate to situation, follows commands, facial expression symmetrical, bilateral hand grasp equal and even, purposeful motor response noted, normal sensation in all extremities when touched with a finger.

## 2025-03-29 NOTE — ED PROVIDER NOTES
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  Pain radiates throughout the abdomen, starting in the LUQ radiating to the right side of the abdomen.  Worse with lying flat.  In the ED yesterday for the same complaint, better after his ED visit but pain worsened when he got home.  Reports associated nausea, vomiting and chest pain.  As well which started yesterday.  Denies fevers/chills, shortness of breath, urinary symptoms or testicular pain or swelling.  Symptoms consistent with prior pancreatitis.      Review of patient's allergies indicates:   Allergen Reactions    [place] Swelling    Pecan nut Swelling    Penicillins Hives     Tolerates cephalosporins    Tolerated piperacillin/tazobactam 11/2023    Sulfa (sulfonamide antibiotics) Hives    Opioids - morphine analogues Itching, Nausea And Vomiting and Rash    Tolonium chloride(toluidine blue-o) Hives, Itching and Rash    Toradol [ketorolac] Rash     Past Medical History:   Diagnosis Date    Acute necrotizing pancreatitis 09/20/2023    Alcohol use disorder 10/05/2023    Asthma     Hepatitis C antibody positive in blood 09/18/2023 9/2023 PCR negative    HIV infection 10/2021    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     Past Surgical History:   Procedure Laterality Date    ENDOSCOPIC ULTRASOUND OF UPPER GASTROINTESTINAL TRACT N/A 10/23/2023    Procedure: ULTRASOUND, UPPER GI TRACT, ENDOSCOPIC;  Surgeon: Emil Villatoro MD;  Location: [facility]  ENDO (2ND FLR);  Service: Endoscopy;  Laterality: N/A;    ENDOSCOPIC ULTRASOUND OF UPPER GASTROINTESTINAL TRACT N/A 7/24/2024    Procedure: ULTRASOUND, UPPER GI TRACT, ENDOSCOPIC;  Surgeon: Faustino Trujillo MD;  Location: [facility] ENDO (2ND FLR);  Service: Endoscopy;  Laterality: N/A;    ERCP N/A 10/23/2023    Procedure: ERCP (ENDOSCOPIC RETROGRADE CHOLANGIOPANCREATOGRAPHY);  Surgeon: Emil Villatoro MD;  Location: [facility] ENDO (2ND FLR);  Service: Endoscopy;  Laterality: N/A;    ESOPHAGOGASTRODUODENOSCOPY N/A 9/18/2023    Procedure: EGD (ESOPHAGOGASTRODUODENOSCOPY);  Surgeon: Kg Cleaning MD;  Location: [facility] ENDO (2ND FLR);  Service: Endoscopy;  Laterality: N/A;    ESOPHAGOGASTRODUODENOSCOPY N/A 3/8/2024    Procedure: EGD (ESOPHAGOGASTRODUODENOSCOPY);  Surgeon: Greg Love MD;  [facility]  Service: Endoscopy;  Laterality: N/A;    ESOPHAGOGASTRODUODENOSCOPY N/A 7/24/2024    Procedure: EGD (ESOPHAGOGASTRODUODENOSCOPY);  Surgeon: Faustino Trujillo MD;  [facility]  Service: Endoscopy;  Laterality: N/A;     Family History   Problem Relation Name Age of Onset    Pancreatitis Mother      Cancer Mother      Ovarian cancer Mother      No Known Problems Father      Cancer Brother      Breast cancer Maternal Grandmother       Social History[1]  Review of Systems    Physical Exam     Initial Vitals [03/29/25 1151]   BP Pulse Resp Temp SpO2   137/89 93 20 99.2 °F (37.3 °C) 97 %      MAP       --         Physical Exam    Nursing note and vitals reviewed.  Constitutional: He appears well-developed and well-nourished. He is not diaphoretic. No distress.   HENT:   Head: Normocephalic and atraumatic.   Eyes: No scleral icterus.   Cardiovascular:  Normal rate, regular rhythm, normal heart sounds and intact distal pulses.     Exam reveals no gallop and no friction rub.       No murmur heard.  Pulmonary/Chest: Breath sounds normal. No respiratory distress. He has no wheezes. He has no  rhonchi. He has no rales. He exhibits no tenderness.   Abdominal: Abdomen is soft. There is generalized abdominal tenderness and tenderness in the epigastric area and left upper quadrant.   Diffuse abdominal tenderness, worse in the epigastrium with guarding There is guarding.   Musculoskeletal:         General: Normal range of motion.     Neurological: He is alert and oriented to person, place, and time.   Skin: Skin is warm and dry.   Psychiatric: He has a normal mood and affect.         ED Course   Procedures  Labs Reviewed   URINALYSIS, REFLEX TO URINE CULTURE - Abnormal       Result Value    Color, UA Yellow      Appearance, UA Clear      pH, UA 6.0      Spec Grav UA >=1.030 (*)     Protein, UA Trace (*)     Glucose, UA Negative      Ketones, UA Negative      Bilirubin, UA Negative      Blood, UA Negative      Nitrites, UA Negative      Urobilinogen, UA Negative      Leukocyte Esterase, UA Negative     CBC WITH DIFFERENTIAL - Abnormal    WBC 6.19      RBC 5.27      HGB 12.6 (*)     HCT 43.2      MCV 82      MCH 23.9 (*)     MCHC 29.2 (*)     RDW 18.6 (*)     Platelet Count 200      MPV 10.9      Nucleated RBC 0      Neut % 63.8      Lymph % 24.1      Mono % 9.5      Eos % 1.8      Basophil % 0.6      Imm Grans % 0.2      Neut # 3.95      Lymph # 1.49      Mono # 0.59      Eos # 0.11      Baso # 0.04      Imm Grans # 0.01     COMPREHENSIVE METABOLIC PANEL - Abnormal    Sodium 135 (*)     Potassium 3.9      Chloride 104      CO2 23      Glucose 102      BUN 8      Creatinine 0.7      Calcium 7.9 (*)     Protein Total 6.5      Albumin 3.3 (*)     Bilirubin Total 0.5             (*)      (*)     Anion Gap 8      eGFR >60     LIPASE - Abnormal    Lipase Level 86 (*)    TROPONIN I HIGH SENSITIVITY - Normal    Troponin High Sensitive <3     LACTIC ACID, PLASMA - Normal    Lactic Acid Level 1.6      Narrative:     Falsely low lactic acid results can be found in samples containing >=13.0 mg/dL total  bilirubin and/or >=3.5 mg/dL direct bilirubin.    CBC W/ AUTO DIFFERENTIAL    Narrative:     The following orders were created for panel order CBC auto differential.  Procedure                               Abnormality         Status                     ---------                               -----------         ------                     CBC with Differential[[id number]]       Abnormal            Final result                 Please view results for these tests on the individual orders.     EKG Readings: (Independently Interpreted)   Initial Reading: No STEMI. Rhythm: Normal Sinus Rhythm. Heart Rate: 69. ST Segments: Normal ST Segments. Clinical Impression: Normal Sinus Rhythm       Imaging Results               CT Abdomen Pelvis  Without Contrast (Final result)  Result time 03/29/25 17:09:11   Procedure changed from CT Abdomen Pelvis With IV Contrast NO Oral Contrast     Final result by Juan Sexton MD (03/29/25 17:09:11)                   Impression:      This report was flagged in Epic as abnormal.    1. Inflammation about the pancreas concerning for developing pancreatitis.  2. There is been significant interval decrease in size of prior pancreatic fluid collection suggesting evolving pseudo cyst.  3. Findings suggest hepatic steatosis noting hepatomegaly, correlation with LFTs recommended.  4. Splenomegaly.  5. Multiple abdominal varices, similar to the previous exam.  6. Please see above for several additional findings.      Electronically signed by: Juan Sexton MD  Date:    03/29/2025  Time:    17:09               Narrative:    EXAMINATION:  CT ABDOMEN PELVIS WITHOUT CONTRAST    CLINICAL HISTORY:  Epigastric pain;Abdominal pain, acute, nonlocalized;    TECHNIQUE:  Low dose axial images, sagittal and coronal reformations were obtained from the lung bases to the pubic symphysis.  Oral contrast was not administered.    COMPARISON:  09/13/2024    FINDINGS:  Images of the lower thorax are  unremarkable.    There is liquid content within the distal esophagus, correlation with any history of gastroesophageal reflux.  The liver is diffusely hypoattenuating suggesting steatosis, correlation with LFTs recommended.  The liver is enlarged.  The spleen is prominent.  There is inflammation about the pancreas noting more focal fluid collection along the anterior aspect of the pancreas at the pancreatic body measuring 1.8 x 1.1 cm.  This likely correlates with prior collection in the region however has significantly decreased in size since that time.  No pancreatic ductal dilation.  There is prominence of the pancreatic head noting some regions of hypoattenuation at the level of the pancreatic head.  The stomach is nondistended, no significant gastric wall thickening.  There are several abdominopelvic and perigastric varices.  There are a few scattered abdominal lymph nodes.  There is high attenuating material within the gallbladder, may reflect vicarious contrast excretion or sludge.    The kidneys have a grossly unremarkable noncontrast appearance without hydronephrosis or nephrolithiasis.  The bilateral ureters are unremarkable without calculi seen.  The urinary bladder is unremarkable.  The prostate is not enlarged.    The large bowel is grossly unremarkable.  The terminal ileum is unremarkable.  The appendix is unremarkable.  The small bowel is grossly unremarkable.  There are a few scattered shotty periaortic, pericaval, and mesenteric lymph nodes.  No focal organized pelvic fluid collection.    No acute osseous abnormalities.  No significant inguinal lymphadenopathy.                                       Medications   ondansetron injection 4 mg (4 mg Intravenous Given 3/29/25 1353)   lactated ringers bolus 1,000 mL (0 mLs Intravenous Stopped 3/29/25 1530)   HYDROmorphone (PF) injection 2 mg (2 mg Intravenous Given 3/29/25 1430)   famotidine (PF) injection 20 mg (20 mg Intravenous Given 3/29/25 1526)    HYDROmorphone (PF) injection 1 mg (1 mg Intravenous Given 3/29/25 1526)   pantoprazole injection 40 mg (40 mg Intravenous Given 3/29/25 1646)   HYDROmorphone (PF) injection 2 mg (2 mg Intravenous Given 3/29/25 1712)     Medical Decision Making  24-year-old male presenting for epigastric pain consistent with prior episodes of pancreatitis.  His vitals are normal, he appears uncomfortable but is in no acute distress.    Differential diagnosis:  Acute on chronic pancreatitis   Pancreatic pseudocyst   Dehydration   Electrolyte derangement     Will check labs, analgesics, antiemetics, CT and reassess.    The patient's IV infiltrated in the CT scan, he did not receive IV contrast.  CT shows pancreatitis with decreased size fluid collection compared to prior.  I recommended admission for this patient given his uncontrolled pain and this being his 2nd ED visit in 2 days.  He declined admission because he does not have a .  Will discharge with antiemetics, analgesics, instructions to follow up clear liquid diet, follow up with GI and return to the ED for worsening symptoms. Stressed the importance of follow-up, strict ED return precautions given.  Patient voiced understanding and is comfortable with discharge.    Amount and/or Complexity of Data Reviewed  Labs: ordered. Decision-making details documented in ED Course.  Radiology: ordered and independent interpretation performed.  ECG/medicine tests: ordered and independent interpretation performed.    Risk  Prescription drug management.  Parenteral controlled substances.  Decision regarding hospitalization.               ED Course as of 03/29/25 1843   Sat Mar 29, 2025   1449 Troponin I High Sensitivity: <3 [CC]   1449 Lactic Acid Level: 1.6 [CC]   1616 Lipase(!): 86 [CC]   1616 AST(!): 138 [CC]   1616 ALT(!): 121 [CC]   1759 I recommended admission for this patient given his repeat ED visits for uncontrolled pain but he does not want to stay in the hospital as  he does not have a  [CC]      ED Course User Index  [CC] Sarah Philippe PA-C                           Clinical Impression:  Final diagnoses:  [R07.9] Chest pain  [K85.90, K86.1] Acute on chronic pancreatitis (Primary)          ED Disposition Condition    Discharge Stable          ED Prescriptions       Medication Sig Dispense Start Date End Date Auth. Provider    oxyCODONE (ROXICODONE) 5 MG immediate release tablet Take 1 tablet (5 mg total) by mouth every 4 (four) hours as needed for Pain. 12 tablet 3/29/2025 -- Sarah Philippe PA-C    ondansetron (ZOFRAN-ODT) 4 MG TbDL Take 1 tablet (4 mg total) by mouth every 6 (six) hours as needed (Nausea). 15 tablet 3/29/2025 -- Sarah Philippe PA-C          Follow-up Information       Follow up With Specialties Details Why Contact Info Additional Information    [facility] [address] Gastroenterology Schedule an appointment as soon as possible for a visit in 1 week  [address]  [place] [address] [phone] [facility] - Main Building, 4th Floor Please park in [facility] and take Atrium elevator    [facility] Emergency Medicine Go to  If symptoms worsen [address]  [place] [address] [phone]                [1]   Social History  Tobacco Use    Smoking status: Former     Types: Cigarettes    Smokeless tobacco: Never   Substance Use Topics    Alcohol use: Not Currently    Drug use: Yes     Types: Marijuana        Sarah Philippe PA-C  03/29/25 5211

## 2025-03-29 NOTE — ED NOTES
..I-STAT Chem-8+ Results:   Value Reference Range   Sodium 144 136-145 mmol/L   Potassium  3.0 3.5-5.1 mmol/L   Chloride 108  mmol/L   Ionized Calcium 0.96 1.06-1.42 mmol/L   CO2 (measured) 22 23-29 mmol/L   Glucose 123  mg/dL   BUN <3 6-30 mg/dL   Creatinine 1.0 0.5-1.4 mg/dL   Hematocrit 37 36-54%

## 2025-03-29 NOTE — CARE UPDATE
Radiology Contrast Extravasation:      During power injection of contrast for CT Abdomen/Pelvis, 75 cc of Omnipaque 350 contrast extravasated into the soft tissues of the patient's left upper extremity. The patient remained neurovascularly intact with no pain at the site of extravasation. Patient examined at bedside and noted to have intact distal pulses without loss of sensation to light touch or decreased range of motion. Pt was educated to Monitor for symptoms of compartment syndrome and that discomfort can be treated with cold packs, elevation, and compression. The patient's nurse and provider was informed of the situation. Please reach out to radiology with any additional questions or concerns.      Darien Taylor MD PGY-2  Diagnostic Radiology  [facility]

## 2025-03-29 NOTE — DISCHARGE INSTRUCTIONS

## 2025-03-29 NOTE — ED PROVIDER NOTES
Encounter Date: 3/28/2025       History     Chief Complaint   Patient presents with    Abdominal Pain     Since yesterday. +N/V. Hx of pancreatitis.      24-year-old male presents to the ER for evaluation of abdominal pain with associated nausea and vomiting.  Past medical history significant for polycythemia vera, chronic pancreatitis, splenic vein thrombosis previously on Eliquis, stopped 2 months ago.     On examination appears uncomfortable, moderate distress.  Vital signs normal      Review of patient's allergies indicates:   Allergen Reactions    [place] Swelling    Pecan nut Swelling    Penicillins Hives     Tolerates cephalosporins    Tolerated piperacillin/tazobactam 11/2023    Sulfa (sulfonamide antibiotics) Hives     Past Medical History:   Diagnosis Date    Acute necrotizing pancreatitis 09/20/2023    Alcohol use disorder 10/05/2023    Asthma     Hepatitis C antibody positive in blood 09/18/2023 9/2023 PCR negative    HIV infection 10/2021    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     Past Surgical History:   Procedure Laterality Date    ENDOSCOPIC ULTRASOUND OF UPPER GASTROINTESTINAL TRACT N/A 10/23/2023    Procedure: ULTRASOUND, UPPER GI TRACT, ENDOSCOPIC;  Surgeon: Emil Villatoro MD;  Location: [address]);  Service: Endoscopy;  Laterality: N/A;    ENDOSCOPIC ULTRASOUND OF UPPER GASTROINTESTINAL TRACT N/A 7/24/2024    Procedure: ULTRASOUND, UPPER GI TRACT, ENDOSCOPIC;  Surgeon: Faustino Trujillo MD;  Location: [address]);  Service: Endoscopy;  Laterality: N/A;    ERCP N/A 10/23/2023    Procedure: ERCP (ENDOSCOPIC RETROGRADE CHOLANGIOPANCREATOGRAPHY);  Surgeon: Emil Villatoro MD;  Location: [address]);  Service: Endoscopy;  Laterality: N/A;     ESOPHAGOGASTRODUODENOSCOPY N/A 9/18/2023    Procedure: EGD (ESOPHAGOGASTRODUODENOSCOPY);  Surgeon: Kg Cleaning MD;  [facility]  Service: Endoscopy;  Laterality: N/A;    ESOPHAGOGASTRODUODENOSCOPY N/A 3/8/2024    Procedure: EGD (ESOPHAGOGASTRODUODENOSCOPY);  Surgeon: Greg Love MD;  [facility]  Service: Endoscopy;  Laterality: N/A;    ESOPHAGOGASTRODUODENOSCOPY N/A 7/24/2024    Procedure: EGD (ESOPHAGOGASTRODUODENOSCOPY);  Surgeon: Faustino Trujillo MD;  [facility]  Service: Endoscopy;  Laterality: N/A;     Family History   Problem Relation Name Age of Onset    Pancreatitis Mother      Cancer Mother      Ovarian cancer Mother      No Known Problems Father      Cancer Brother      Breast cancer Maternal Grandmother       Social History[1]  Review of Systems   Constitutional:  Negative for fever.   HENT:  Negative for sore throat.    Respiratory:  Negative for shortness of breath.    Cardiovascular:  Negative for chest pain.   Gastrointestinal:  Positive for abdominal pain, nausea and vomiting.   Genitourinary:  Negative for dysuria.   Musculoskeletal:  Negative for back pain.   Skin:  Negative for rash.   Neurological:  Negative for weakness.   Hematological:  Does not bruise/bleed easily.       Physical Exam     Initial Vitals [03/28/25 2034]   BP Pulse Resp Temp SpO2   123/78 108 18 98.2 °F (36.8 °C) 97 %      MAP       --         Physical Exam    Constitutional: Vital signs are normal. He appears well-developed and well-nourished.   HENT:   Head: Normocephalic and atraumatic.   Right Ear: Hearing normal.   Left Ear: Hearing normal.   Eyes: Conjunctivae are normal.   Cardiovascular:  Normal rate and regular rhythm.           Pulmonary/Chest:   Clear   Abdominal: Abdomen is soft. Bowel sounds are normal.   Exquisitely tender primarily to the left side of the abdomen upper quadrant    CVA tenderness  No organomegaly   Musculoskeletal:          General: Normal range of motion.     Neurological: He is alert and oriented to person, place, and time.   Skin: Skin is warm and intact.   Psychiatric: He has a normal mood and affect. His speech is normal and behavior is normal. Cognition and memory are normal.         ED Course   Procedures  Labs Reviewed   CBC WITH DIFFERENTIAL - Abnormal       Result Value    WBC 5.96      RBC 5.39      HGB 12.9 (*)     HCT 43.2      MCV 80 (*)     MCH 23.9 (*)     MCHC 29.9 (*)     RDW 18.2 (*)     Platelet Count 292      MPV 10.4      Nucleated RBC 0      Neut % 34.6 (*)     Lymph % 51.3 (*)     Mono % 9.6      Eos % 3.4      Basophil % 0.8      Imm Grans % 0.3      Neut # 2.06      Lymph # 3.06      Mono # 0.57      Eos # 0.20      Baso # 0.05      Imm Grans # 0.02     COMPREHENSIVE METABOLIC PANEL - Abnormal    Sodium 139      Potassium 3.9      Chloride 112 (*)     CO2 14 (*)     Glucose 112 (*)     BUN 4 (*)     Creatinine 0.7      Calcium 7.7 (*)     Protein Total 7.3      Albumin 3.5      Bilirubin Total 0.2             (*)      (*)     Anion Gap 13      eGFR >60     LACTIC ACID, PLASMA - Normal    Lactic Acid Level 1.6      Narrative:     Falsely low lactic acid results can be found in samples containing >=13.0 mg/dL total bilirubin and/or >=3.5 mg/dL direct bilirubin.    LIPASE - Normal    Lipase Level 12     CBC W/ AUTO DIFFERENTIAL    Narrative:     The following orders were created for panel order CBC auto differential.  Procedure                               Abnormality         Status                     ---------                               -----------         ------                     CBC with Differential[[id number]]       Abnormal            Final result                 Please view results for these tests on the individual orders.   EXTRA TUBES    Narrative:     The following orders were created for panel order EXTRA TUBES.  Procedure                               Abnormality          Status                     ---------                               -----------         ------                     Gold Top Hold[[id number]]                                                                Please view results for these tests on the individual orders.   GOLD TOP HOLD   ISTAT CHEM8          Imaging Results    None          Medications   sodium chloride 0.9% bolus 1,000 mL 1,000 mL (0 mLs Intravenous Stopped 3/29/25 0011)   pantoprazole injection 40 mg (40 mg Intravenous Given 3/28/25 2224)   promethazine (PHENERGAN) 12.5 mg in 0.9% NaCl 50 mL IVPB (0 mg Intravenous Stopped 3/28/25 2258)   HYDROmorphone injection 2 mg (2 mg Intravenous Given 3/28/25 2254)     Medical Decision Making  24-year-old male presenting with acute onset of nausea vomiting and abdominal pain   Differential gastroenteritis, gastritis, pancreatitis, mesenteric ischemia      12:15 a.m. assessment   Labs reviewed, lipase normal, CO2 14 suspect this is likely secondary to emesis.  Lactic acid is normal, doubt mesenteric ischemia.  Patient's symptoms significantly improved after treatment in the ED.  Resting comfortably, tolerated a p.o. challenge, no longer vomiting.  Repeat CO2 improved on i-STAT.  Patient is stable for discharge.  Suspect acute gastroenteritis, gastritis.    Amount and/or Complexity of Data Reviewed  Labs: ordered.    Risk  Prescription drug management.                                      Clinical Impression:  Final diagnoses:  [R10.9] Abdominal pain, unspecified abdominal location (Primary)          ED Disposition Condition    Discharge Stable          ED Prescriptions       Medication Sig Dispense Start Date End Date Auth. Provider    promethazine (PHENERGAN) 25 MG tablet Take 1 tablet (25 mg total) by mouth every 6 (six) hours as needed for Nausea. 15 tablet 3/29/2025 -- Juan Briceño PA-C          Follow-up Information    None              [1]   Social History  Tobacco Use    Smoking status: Former      Types: Cigarettes    Smokeless tobacco: Never   Substance Use Topics    Alcohol use: Not Currently    Drug use: Yes     Types: Marijuana        Juan Briceño PA-C  03/29/25 0022

## 2025-03-29 NOTE — DISCHARGE INSTRUCTIONS
Diagnosis: Acute on chronic pancreatitis    You have pancreatitis.  Your lab tests showed elevated liver tests which has been stable.  I am prescribing medicine for pain and nausea you can take as needed.  You should keep a clear liquid diet for several days until you are feeling better.    I sent a referral to our Gastroenterology doctors for follow-up.  If you start to have any worsening symptoms, please return to the emergency department immediately.

## 2025-03-30 ENCOUNTER — HOSPITAL ENCOUNTER (OUTPATIENT)
Facility: HOSPITAL | Age: 25
Discharge: HOME | End: 2025-04-01
Attending: EMERGENCY MEDICINE | Admitting: STUDENT IN AN ORGANIZED HEALTH CARE EDUCATION/TRAINING PROGRAM
Payer: MEDICAID

## 2025-03-30 DIAGNOSIS — R07.9 CHEST PAIN: ICD-10-CM

## 2025-03-30 DIAGNOSIS — R10.13 EPIGASTRIC PAIN: ICD-10-CM

## 2025-03-30 DIAGNOSIS — R11.2 NAUSEA AND VOMITING, UNSPECIFIED VOMITING TYPE: Primary | ICD-10-CM

## 2025-03-30 DIAGNOSIS — Z21 HIV INFECTION, UNSPECIFIED SYMPTOM STATUS: ICD-10-CM

## 2025-03-30 DIAGNOSIS — K86.1 ACUTE ON CHRONIC PANCREATITIS: ICD-10-CM

## 2025-03-30 DIAGNOSIS — K85.90 ACUTE ON CHRONIC PANCREATITIS: ICD-10-CM

## 2025-03-30 PROBLEM — R79.89 LFT ELEVATION: Status: ACTIVE | Noted: 2025-03-30

## 2025-03-30 LAB
ABSOLUTE EOSINOPHIL (OHS): 0.12 K/UL
ABSOLUTE MONOCYTE (OHS): 0.62 K/UL (ref 0.3–1)
ABSOLUTE NEUTROPHIL COUNT (OHS): 2.66 K/UL (ref 1.8–7.7)
ALBUMIN SERPL BCP-MCNC: 3.6 G/DL (ref 3.5–5.2)
ALP SERPL-CCNC: 115 UNIT/L (ref 40–150)
ALT SERPL W/O P-5'-P-CCNC: 104 UNIT/L (ref 10–44)
ANION GAP (OHS): 13 MMOL/L (ref 8–16)
AST SERPL-CCNC: 85 UNIT/L (ref 11–45)
BASOPHILS # BLD AUTO: 0.02 K/UL
BASOPHILS NFR BLD AUTO: 0.4 %
BILIRUB SERPL-MCNC: 0.7 MG/DL (ref 0.1–1)
BILIRUB UR QL STRIP.AUTO: NEGATIVE
BUN SERPL-MCNC: 4 MG/DL (ref 6–20)
CALCIUM SERPL-MCNC: 8.6 MG/DL (ref 8.7–10.5)
CHLORIDE SERPL-SCNC: 101 MMOL/L (ref 95–110)
CHOLEST SERPL-MCNC: 129 MG/DL (ref 120–199)
CHOLEST/HDLC SERPL: 3.3 {RATIO} (ref 2–5)
CLARITY UR: CLEAR
CO2 SERPL-SCNC: 21 MMOL/L (ref 23–29)
COLOR UR AUTO: COLORLESS
CREAT SERPL-MCNC: 0.7 MG/DL (ref 0.5–1.4)
ERYTHROCYTE [DISTWIDTH] IN BLOOD BY AUTOMATED COUNT: 17.9 % (ref 11.5–14.5)
GFR SERPLBLD CREATININE-BSD FMLA CKD-EPI: >60 ML/MIN/1.73/M2
GLUCOSE SERPL-MCNC: 119 MG/DL (ref 70–110)
GLUCOSE UR QL STRIP: NEGATIVE
HCT VFR BLD AUTO: 37 % (ref 40–54)
HDLC SERPL-MCNC: 39 MG/DL (ref 40–75)
HDLC SERPL: 30.2 % (ref 20–50)
HGB BLD-MCNC: 11.3 GM/DL (ref 14–18)
HGB UR QL STRIP: NEGATIVE
IMM GRANULOCYTES # BLD AUTO: 0.01 K/UL (ref 0–0.04)
IMM GRANULOCYTES NFR BLD AUTO: 0.2 % (ref 0–0.5)
KETONES UR QL STRIP: NEGATIVE
LDLC SERPL CALC-MCNC: 74.8 MG/DL (ref 63–159)
LEUKOCYTE ESTERASE UR QL STRIP: NEGATIVE
LIPASE SERPL-CCNC: 167 U/L (ref 4–60)
LYMPHOCYTES # BLD AUTO: 1.36 K/UL (ref 1–4.8)
MAGNESIUM SERPL-MCNC: 1.6 MG/DL (ref 1.6–2.6)
MCH RBC QN AUTO: 24.8 PG (ref 27–50)
MCHC RBC AUTO-ENTMCNC: 30.5 G/DL (ref 32–36)
MCV RBC AUTO: 81 FL (ref 82–98)
NITRITE UR QL STRIP: NEGATIVE
NONHDLC SERPL-MCNC: 90 MG/DL
NUCLEATED RBC (/100WBC) (OHS): 0 /100 WBC
OHS QRS DURATION: 90 MS
OHS QTC CALCULATION: 420 MS
PH UR STRIP: 7 [PH]
PLATELET # BLD AUTO: 157 K/UL (ref 150–450)
PMV BLD AUTO: 10.8 FL (ref 9.2–12.9)
POTASSIUM SERPL-SCNC: 3.3 MMOL/L (ref 3.5–5.1)
PROT SERPL-MCNC: 7.2 GM/DL (ref 6–8.4)
PROT UR QL STRIP: NEGATIVE
RBC # BLD AUTO: 4.55 M/UL (ref 4.6–6.2)
RELATIVE EOSINOPHIL (OHS): 2.5 %
RELATIVE LYMPHOCYTE (OHS): 28.4 % (ref 18–48)
RELATIVE MONOCYTE (OHS): 12.9 % (ref 4–15)
RELATIVE NEUTROPHIL (OHS): 55.6 % (ref 38–73)
SODIUM SERPL-SCNC: 135 MMOL/L (ref 136–145)
SP GR UR STRIP: 1
TRIGL SERPL-MCNC: 76 MG/DL (ref 30–150)
UROBILINOGEN UR STRIP-ACNC: NEGATIVE EU/DL
WBC # BLD AUTO: 4.79 K/UL (ref 3.9–12.7)

## 2025-03-30 PROCEDURE — 25000003 PHARM REV CODE 250: Performed by: STUDENT IN AN ORGANIZED HEALTH CARE EDUCATION/TRAINING PROGRAM

## 2025-03-30 PROCEDURE — 25000003 PHARM REV CODE 250: Performed by: EMERGENCY MEDICINE

## 2025-03-30 PROCEDURE — 63600175 PHARM REV CODE 636 W HCPCS: Performed by: STUDENT IN AN ORGANIZED HEALTH CARE EDUCATION/TRAINING PROGRAM

## 2025-03-30 PROCEDURE — 96375 TX/PRO/DX INJ NEW DRUG ADDON: CPT

## 2025-03-30 PROCEDURE — 96376 TX/PRO/DX INJ SAME DRUG ADON: CPT

## 2025-03-30 PROCEDURE — 82465 ASSAY BLD/SERUM CHOLESTEROL: CPT | Performed by: STUDENT IN AN ORGANIZED HEALTH CARE EDUCATION/TRAINING PROGRAM

## 2025-03-30 PROCEDURE — 85025 COMPLETE CBC W/AUTO DIFF WBC: CPT | Performed by: EMERGENCY MEDICINE

## 2025-03-30 PROCEDURE — 96361 HYDRATE IV INFUSION ADD-ON: CPT

## 2025-03-30 PROCEDURE — G0378 HOSPITAL OBSERVATION PER HR: HCPCS

## 2025-03-30 PROCEDURE — 80053 COMPREHEN METABOLIC PANEL: CPT | Performed by: EMERGENCY MEDICINE

## 2025-03-30 PROCEDURE — 99285 EMERGENCY DEPT VISIT HI MDM: CPT

## 2025-03-30 PROCEDURE — 83690 ASSAY OF LIPASE: CPT | Performed by: EMERGENCY MEDICINE

## 2025-03-30 PROCEDURE — 83735 ASSAY OF MAGNESIUM: CPT | Performed by: STUDENT IN AN ORGANIZED HEALTH CARE EDUCATION/TRAINING PROGRAM

## 2025-03-30 PROCEDURE — 96374 THER/PROPH/DIAG INJ IV PUSH: CPT

## 2025-03-30 PROCEDURE — 63600175 PHARM REV CODE 636 W HCPCS: Mod: JZ,TB | Performed by: EMERGENCY MEDICINE

## 2025-03-30 PROCEDURE — 81003 URINALYSIS AUTO W/O SCOPE: CPT | Performed by: EMERGENCY MEDICINE

## 2025-03-30 RX ORDER — GLUCAGON 1 MG
1 KIT INJECTION
Status: DISCONTINUED | OUTPATIENT
Start: 2025-03-30 | End: 2025-04-01 | Stop reason: HOSPADM

## 2025-03-30 RX ORDER — SODIUM CHLORIDE 0.9 % (FLUSH) 0.9 %
10 SYRINGE (ML) INJECTION EVERY 12 HOURS PRN
Status: DISCONTINUED | OUTPATIENT
Start: 2025-03-30 | End: 2025-04-01 | Stop reason: HOSPADM

## 2025-03-30 RX ORDER — NALOXONE HCL 0.4 MG/ML
0.02 VIAL (ML) INJECTION
Status: DISCONTINUED | OUTPATIENT
Start: 2025-03-30 | End: 2025-04-01 | Stop reason: HOSPADM

## 2025-03-30 RX ORDER — IBUPROFEN 200 MG
24 TABLET ORAL
Status: DISCONTINUED | OUTPATIENT
Start: 2025-03-30 | End: 2025-04-01 | Stop reason: HOSPADM

## 2025-03-30 RX ORDER — ACETAMINOPHEN 500 MG
1000 TABLET ORAL EVERY 8 HOURS PRN
Status: DISCONTINUED | OUTPATIENT
Start: 2025-03-30 | End: 2025-04-01 | Stop reason: HOSPADM

## 2025-03-30 RX ORDER — LIDOCAINE 50 MG/G
1 PATCH TOPICAL
Status: DISCONTINUED | OUTPATIENT
Start: 2025-03-30 | End: 2025-04-01 | Stop reason: HOSPADM

## 2025-03-30 RX ORDER — SODIUM CHLORIDE, SODIUM LACTATE, POTASSIUM CHLORIDE, CALCIUM CHLORIDE 600; 310; 30; 20 MG/100ML; MG/100ML; MG/100ML; MG/100ML
INJECTION, SOLUTION INTRAVENOUS CONTINUOUS
Status: ACTIVE | OUTPATIENT
Start: 2025-03-30 | End: 2025-03-31

## 2025-03-30 RX ORDER — AMOXICILLIN 250 MG
1 CAPSULE ORAL 2 TIMES DAILY
Status: DISCONTINUED | OUTPATIENT
Start: 2025-03-30 | End: 2025-04-01 | Stop reason: HOSPADM

## 2025-03-30 RX ORDER — HYDROMORPHONE HYDROCHLORIDE 2 MG/ML
2 INJECTION, SOLUTION INTRAMUSCULAR; INTRAVENOUS; SUBCUTANEOUS EVERY 4 HOURS PRN
Status: DISCONTINUED | OUTPATIENT
Start: 2025-03-30 | End: 2025-04-01 | Stop reason: HOSPADM

## 2025-03-30 RX ORDER — ENOXAPARIN SODIUM 100 MG/ML
40 INJECTION SUBCUTANEOUS EVERY 24 HOURS
Status: DISCONTINUED | OUTPATIENT
Start: 2025-03-30 | End: 2025-04-01 | Stop reason: HOSPADM

## 2025-03-30 RX ORDER — PROMETHAZINE HYDROCHLORIDE 25 MG/1
25 TABLET ORAL EVERY 6 HOURS PRN
Status: DISCONTINUED | OUTPATIENT
Start: 2025-03-30 | End: 2025-04-01 | Stop reason: HOSPADM

## 2025-03-30 RX ORDER — HYDROMORPHONE HYDROCHLORIDE 1 MG/ML
1 INJECTION, SOLUTION INTRAMUSCULAR; INTRAVENOUS; SUBCUTANEOUS
Refills: 0 | Status: DISCONTINUED | OUTPATIENT
Start: 2025-03-30 | End: 2025-03-30

## 2025-03-30 RX ORDER — IBUPROFEN 200 MG
16 TABLET ORAL
Status: DISCONTINUED | OUTPATIENT
Start: 2025-03-30 | End: 2025-04-01 | Stop reason: HOSPADM

## 2025-03-30 RX ORDER — HYDROMORPHONE HYDROCHLORIDE 2 MG/ML
1 INJECTION, SOLUTION INTRAMUSCULAR; INTRAVENOUS; SUBCUTANEOUS
Refills: 0 | Status: COMPLETED | OUTPATIENT
Start: 2025-03-30 | End: 2025-03-30

## 2025-03-30 RX ORDER — PROCHLORPERAZINE EDISYLATE 5 MG/ML
5 INJECTION INTRAMUSCULAR; INTRAVENOUS EVERY 6 HOURS PRN
Status: DISCONTINUED | OUTPATIENT
Start: 2025-03-30 | End: 2025-03-30

## 2025-03-30 RX ORDER — ALUMINUM HYDROXIDE, MAGNESIUM HYDROXIDE, AND SIMETHICONE 1200; 120; 1200 MG/30ML; MG/30ML; MG/30ML
30 SUSPENSION ORAL 4 TIMES DAILY PRN
Status: DISCONTINUED | OUTPATIENT
Start: 2025-03-30 | End: 2025-04-01 | Stop reason: HOSPADM

## 2025-03-30 RX ORDER — POTASSIUM CHLORIDE 7.45 MG/ML
10 INJECTION INTRAVENOUS
Status: DISCONTINUED | OUTPATIENT
Start: 2025-03-30 | End: 2025-03-30

## 2025-03-30 RX ORDER — ONDANSETRON HYDROCHLORIDE 2 MG/ML
4 INJECTION, SOLUTION INTRAVENOUS
Status: COMPLETED | OUTPATIENT
Start: 2025-03-30 | End: 2025-03-30

## 2025-03-30 RX ORDER — ONDANSETRON HYDROCHLORIDE 2 MG/ML
4 INJECTION, SOLUTION INTRAVENOUS EVERY 6 HOURS PRN
Status: DISCONTINUED | OUTPATIENT
Start: 2025-03-30 | End: 2025-04-01 | Stop reason: HOSPADM

## 2025-03-30 RX ORDER — SIMETHICONE 80 MG
1 TABLET,CHEWABLE ORAL 4 TIMES DAILY PRN
Status: DISCONTINUED | OUTPATIENT
Start: 2025-03-30 | End: 2025-04-01 | Stop reason: HOSPADM

## 2025-03-30 RX ORDER — TALC
6 POWDER (GRAM) TOPICAL NIGHTLY PRN
Status: DISCONTINUED | OUTPATIENT
Start: 2025-03-30 | End: 2025-04-01 | Stop reason: HOSPADM

## 2025-03-30 RX ADMIN — HYDROMORPHONE HYDROCHLORIDE 1 MG: 2 INJECTION, SOLUTION INTRAMUSCULAR; INTRAVENOUS; SUBCUTANEOUS at 02:03

## 2025-03-30 RX ADMIN — HYDROMORPHONE HYDROCHLORIDE 2 MG: 2 INJECTION, SOLUTION INTRAMUSCULAR; INTRAVENOUS; SUBCUTANEOUS at 08:03

## 2025-03-30 RX ADMIN — ONDANSETRON 4 MG: 2 INJECTION INTRAMUSCULAR; INTRAVENOUS at 08:03

## 2025-03-30 RX ADMIN — HYDROMORPHONE HYDROCHLORIDE 2 MG: 2 INJECTION, SOLUTION INTRAMUSCULAR; INTRAVENOUS; SUBCUTANEOUS at 03:03

## 2025-03-30 RX ADMIN — SODIUM CHLORIDE, POTASSIUM CHLORIDE, SODIUM LACTATE AND CALCIUM CHLORIDE: 600; 310; 30; 20 INJECTION, SOLUTION INTRAVENOUS at 10:03

## 2025-03-30 RX ADMIN — SODIUM CHLORIDE 1000 ML: 9 INJECTION, SOLUTION INTRAVENOUS at 01:03

## 2025-03-30 RX ADMIN — SODIUM CHLORIDE, POTASSIUM CHLORIDE, SODIUM LACTATE AND CALCIUM CHLORIDE: 600; 310; 30; 20 INJECTION, SOLUTION INTRAVENOUS at 03:03

## 2025-03-30 RX ADMIN — SENNOSIDES AND DOCUSATE SODIUM 1 TABLET: 50; 8.6 TABLET ORAL at 08:03

## 2025-03-30 RX ADMIN — ONDANSETRON 4 MG: 2 INJECTION INTRAMUSCULAR; INTRAVENOUS at 01:03

## 2025-03-30 RX ADMIN — BICTEGRAVIR SODIUM, EMTRICITABINE, AND TENOFOVIR ALAFENAMIDE FUMARATE 1 TABLET: 50; 200; 25 TABLET ORAL at 03:03

## 2025-03-30 RX ADMIN — POTASSIUM BICARBONATE 25 MEQ: 978 TABLET, EFFERVESCENT ORAL at 04:03

## 2025-03-30 RX ADMIN — Medication 6 MG: at 08:03

## 2025-03-30 NOTE — ED NOTES
I-STAT Chem-8+ Results:   Value Reference Range   Sodium 135 136-145 mmol/L   Potassium  3.3 3.5-5.1 mmol/L   Chloride 100  mmol/L   Ionized Calcium 1.04 1.06-1.42 mmol/L   CO2 (measured) 24 23-29 mmol/L   Glucose 132  mg/dL   BUN  <3 6-30 mg/dL   Creatinine 0.7 0.5-1.4 mg/dL   Hematocrit 39 36-54%

## 2025-03-30 NOTE — ED PROVIDER NOTES
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  Left upper quadrant pain radiating through the entire abdomen focalized on the right side of the abdomen.  Pain is worse with lying flat, associated with nausea, vomiting with symptoms consistent with prior episodes of pancreatitis.  He was seen in emergency department yesterday and required multiple rounds of pain medication, antiemetics and was recommended for admission however he had to leave for personal and family reasons.  He now returns with increased pain, increased intolerance to oral liquids and diet.  Pain is severe rated at 10/10.  Denies any associated fevers, chills, shortness of breath, urinary symptoms, testicular pain or swelling.    Review of patient's allergies indicates:   Allergen Reactions    [place] Swelling    Pecan nut Swelling    Penicillins Hives     Tolerates cephalosporins    Tolerated piperacillin/tazobactam 11/2023    Sulfa (sulfonamide antibiotics) Hives    Opioids - morphine analogues Itching, Nausea And Vomiting and Rash    Tolonium chloride(toluidine blue-o) Hives, Itching and Rash    Toradol [ketorolac] Rash     Past Medical History:   Diagnosis Date    Acute necrotizing pancreatitis 09/20/2023    Alcohol use disorder 10/05/2023    Asthma     Hepatitis C antibody positive in blood 09/18/2023 9/2023 PCR negative    HIV infection 10/2021    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/2023    Normocytic anemia 09/18/2023    Pancreatic pseudocyst/cyst 10/24/2023    Polycythemia vera 11/28/2021    Receives phlebotomy if Hct>45    Positive CMV IgG  serology 09/21/2023    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     Past Surgical History:   Procedure Laterality Date    ENDOSCOPIC ULTRASOUND OF UPPER GASTROINTESTINAL TRACT N/A 10/23/2023    Procedure: ULTRASOUND, UPPER GI TRACT, ENDOSCOPIC;  Surgeon: Emil Villatoro MD;  [facility]  Service: Endoscopy;  Laterality: N/A;    ENDOSCOPIC ULTRASOUND OF UPPER GASTROINTESTINAL TRACT N/A 7/24/2024    Procedure: ULTRASOUND, UPPER GI TRACT, ENDOSCOPIC;  Surgeon: Faustino Trujillo MD;  [facility]  Service: Endoscopy;  Laterality: N/A;    ERCP N/A 10/23/2023    Procedure: ERCP (ENDOSCOPIC RETROGRADE CHOLANGIOPANCREATOGRAPHY);  Surgeon: Emil Villatoro MD;  [facility]  Service: Endoscopy;  Laterality: N/A;    ESOPHAGOGASTRODUODENOSCOPY N/A 9/18/2023    Procedure: EGD (ESOPHAGOGASTRODUODENOSCOPY);  Surgeon: Kg Cleaning MD;  [facility]  Service: Endoscopy;  Laterality: N/A;    ESOPHAGOGASTRODUODENOSCOPY N/A 3/8/2024    Procedure: EGD (ESOPHAGOGASTRODUODENOSCOPY);  Surgeon: Greg Love MD;  [facility]: [facility]  Service: Endoscopy;  Laterality: N/A;    ESOPHAGOGASTRODUODENOSCOPY N/A 7/24/2024    Procedure: EGD (ESOPHAGOGASTRODUODENOSCOPY);  Surgeon: Faustino Trujillo MD;  Location: [facility]  Service: Endoscopy;  Laterality: N/A;     Family History   Problem Relation Name Age of Onset    Pancreatitis Mother      Cancer Mother      Ovarian cancer Mother      No Known Problems Father      Cancer Brother      Breast cancer Maternal Grandmother       Social History[1]  Review of Systems  All other systems reviewed and were negative; see HPI also for additional ROS.    Physical Exam     Initial Vitals [03/30/25 1217]   BP Pulse Resp Temp SpO2   136/87 97 18 98.6 °F (37 °C) 100 %      MAP       --         Physical Exam    Nursing note and vitals reviewed.      Gen/Constitutional: Interactive.  Moderate emotional distress  secondary to pain  Head: Normocephalic, Atraumatic  Neck: supple, no masses or LAD, no JVD  Eyes: PERRLA, conjunctiva clear  Ears, Nose and Throat: No rhinorrhea or stridor.  Cardiac:  Regular rate, Reg Rhythm, No murmur  Pulmonary: CTA Bilat, no wheezes, rhonchi, rales.  No increased work of breathing.  GI: Abdomen soft, diffusely tender, non-distended; no rebound or guarding  : No CVA tenderness.  Musculoskeletal: Extremities warm, well perfused, no erythema, no edema  Skin: No rashes, cyanosis or jaundice.  Neuro: Alert and Oriented x 3; No focal motor or sensory deficits.    Psych: Normal affect      ED Course   Procedures  Labs Reviewed   COMPREHENSIVE METABOLIC PANEL - Abnormal       Result Value    Sodium 135 (*)     Potassium 3.3 (*)     Chloride 101      CO2 21 (*)     Glucose 119 (*)     BUN 4 (*)     Creatinine 0.7      Calcium 8.6 (*)     Protein Total 7.2      Albumin 3.6      Bilirubin Total 0.7            AST 85 (*)      (*)     Anion Gap 13      eGFR >60     LIPASE - Abnormal    Lipase Level 167 (*)    URINALYSIS, REFLEX TO URINE CULTURE - Abnormal    Color, UA Colorless (*)     Appearance, UA Clear      pH, UA 7.0      Spec Grav UA 1.005      Protein, UA Negative      Glucose, UA Negative      Ketones, UA Negative      Bilirubin, UA Negative      Blood, UA Negative      Nitrites, UA Negative      Urobilinogen, UA Negative      Leukocyte Esterase, UA Negative     CBC WITH DIFFERENTIAL - Abnormal    WBC 4.79      RBC 4.55 (*)     HGB 11.3 (*)     HCT 37.0 (*)     MCV 81 (*)     MCH 24.8 (*)     MCHC 30.5 (*)     RDW 17.9 (*)     Platelet Count 157      MPV 10.8      Nucleated RBC 0      Neut % 55.6      Lymph % 28.4      Mono % 12.9      Eos % 2.5      Basophil % 0.4      Imm Grans % 0.2      Neut # 2.66      Lymph # 1.36      Mono # 0.62      Eos # 0.12      Baso # 0.02      Imm Grans # 0.01     LIPID PANEL - Abnormal    Cholesterol Total 129      Triglyceride 76      HDL Cholesterol  39 (*)     LDL Cholesterol 74.8      HDL/Cholesterol Ratio 30.2      Cholesterol/HDL Ratio 3.3      Non HDL Cholesterol 90     MAGNESIUM - Normal    Magnesium  1.6     CBC W/ AUTO DIFFERENTIAL    Narrative:     The following orders were created for panel order CBC W/ AUTO DIFFERENTIAL.  Procedure                               Abnormality         Status                     ---------                               -----------         ------                     CBC with Differential[[id number]]       Abnormal            Final result                 Please view results for these tests on the individual orders.   TOXICOLOGY SCREEN, URINE, RANDOM (COMPLIANCE)   ALCOHOL,URINE MEDICAL (ETHANOL)   ISTAT CHEM8          Imaging Results    None          Medications   sodium chloride 0.9% flush 10 mL (has no administration in time range)   naloxone 0.4 mg/mL injection 0.02 mg (has no administration in time range)   glucose chewable tablet 16 g (has no administration in time range)   glucose chewable tablet 24 g (has no administration in time range)   dextrose 50% injection 12.5 g (has no administration in time range)   dextrose 50% injection 25 g (has no administration in time range)   glucagon (human recombinant) injection 1 mg (has no administration in time range)   enoxaparin injection 40 mg (40 mg Subcutaneous Not Given 3/30/25 1700)   acetaminophen tablet 1,000 mg (has no administration in time range)   HYDROmorphone (PF) injection 2 mg (2 mg Intravenous Given 3/30/25 1544)   senna-docusate 8.6-50 mg per tablet 1 tablet (has no administration in time range)   ondansetron injection 4 mg (has no administration in time range)   simethicone chewable tablet 80 mg (has no administration in time range)   aluminum-magnesium hydroxide-simethicone 200-200-20 mg/5 mL suspension 30 mL (has no administration in time range)   melatonin tablet 6 mg (has no administration in time range)   lactated ringers infusion ( Intravenous New Bag  3/30/25 1548)   ewnfmraie-uqcjicyp-pinoqfb ala -25 mg (25 kg or greater) 1 tablet (1 tablet Oral Given 3/30/25 1549)   promethazine tablet 25 mg (has no administration in time range)   LIDOcaine 5 % patch 1 patch (1 patch Transdermal Not Given 3/30/25 1615)   sodium chloride 0.9% bolus 1,000 mL 1,000 mL (0 mLs Intravenous Stopped 3/30/25 1459)   ondansetron injection 4 mg (4 mg Intravenous Given 3/30/25 1359)   HYDROmorphone (PF) injection 1 mg (1 mg Intravenous Given 3/30/25 1413)   potassium bicarbonate disintegrating tablet 25 mEq (25 mEq Oral Given 3/30/25 1639)     Medical Decision Making  Tasia Cardona is a 24-year-old male with a history of HIV on HAART, chronic pancreatitis with necrotizing pancreatitis, anemia, polycythemic vera, 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     Differential diagnosis includes:  Acute pancreatitis, acute on chronic pancreatitis, necrotizing pancreatitis, obstruction, perforation, dehydration    Amount and/or Complexity of Data Reviewed  Labs: ordered.    Risk  Prescription drug management.  Parenteral controlled substances.  Decision regarding hospitalization.    Urgent evaluation of patient presenting with ongoing abdominal pain associated with nausea and vomiting similar to his episode yesterday.  He has prior episodes of pancreatitis in the past and these episodes are similar.  Is uncomfortable but no acute distress.  I reviewed his imaging from yesterday shows pancreatitis with decreased fluid collection.  This is his 3rd visit in 3 days.  Declined admission yesterday due to family/dog sitting needs.  He was discharged with antiemetics and analgesics symptoms continue to worsen.  Will obtain repeat labs, IV antiemetic, IV pain management, IV fluids and plan for admission.  Labs evaluated, sodium 135, BUN 4 with CO2 of 21, lipase increased from yesterday to 167 with no significant leukocytosis or  no significant anemia.  Discussed case with hospital medicine, will place in observation for acute on chronic pancreatitis with nausea vomiting and ongoing pain.  Patient received IV Dilaudid and IV antiemetic in the emergency department.                                  Clinical Impression:  Final diagnoses:  [R11.2] Nausea and vomiting, unspecified vomiting type (Primary)  [R10.13] Epigastric pain  [K85.90, K86.1] Acute on chronic pancreatitis          ED Disposition Condition    Observation Stable               Eren Knight DO, FAAEM  Emergency Staff Physician   Dept of Emergency Medicine   [facility]  Spectralink: [id number]        Disclaimer: This note has been generated using voice-recognition software. There may be typographical errors that have been missed during proof-reading.         [1]   Social History  Tobacco Use    Smoking status: Former     Types: Cigarettes    Smokeless tobacco: Never   Substance Use Topics    Alcohol use: Not Currently    Drug use: Yes     Types: Marijuana        Eren Knight DO  03/30/25 1953

## 2025-03-30 NOTE — SUBJECTIVE & OBJECTIVE
Past Medical History:   Diagnosis Date    Acute necrotizing pancreatitis 09/20/2023    Alcohol use disorder 10/05/2023    Asthma     Hepatitis C antibody positive in blood 09/18/2023 9/2023 PCR negative    HIV infection 10/2021    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       Past Surgical History:   Procedure Laterality Date    ENDOSCOPIC ULTRASOUND OF UPPER GASTROINTESTINAL TRACT N/A 10/23/2023    Procedure: ULTRASOUND, UPPER GI TRACT, ENDOSCOPIC;  Surgeon: Emil Villatoro MD;  Location: [facility] ([address] [place] [address]);  Service: Endoscopy;  Laterality: N/A;    ENDOSCOPIC ULTRASOUND OF UPPER GASTROINTESTINAL TRACT N/A 7/24/2024    Procedure: ULTRASOUND, UPPER GI TRACT, ENDOSCOPIC;  Surgeon: Faustino Trujillo MD;  Location: [facility] ([address] [place] [address]);  Service: Endoscopy;  Laterality: N/A;    ERCP N/A 10/23/2023    Procedure: ERCP (ENDOSCOPIC RETROGRADE CHOLANGIOPANCREATOGRAPHY);  Surgeon: Emil Villatoro MD;  [facility]  Service: Endoscopy;  Laterality: N/A;    ESOPHAGOGASTRODUODENOSCOPY N/A 9/18/2023    Procedure: EGD (ESOPHAGOGASTRODUODENOSCOPY);  Surgeon: Kg Cleaning MD;  [facility]  Service: Endoscopy;  Laterality: N/A;    ESOPHAGOGASTRODUODENOSCOPY N/A 3/8/2024    Procedure: EGD (ESOPHAGOGASTRODUODENOSCOPY);  Surgeon: Greg Love MD;  [facility]  Service: Endoscopy;  Laterality: N/A;    ESOPHAGOGASTRODUODENOSCOPY N/A 7/24/2024    Procedure: EGD (ESOPHAGOGASTRODUODENOSCOPY);  Surgeon: Faustino Trujillo MD;  [facility]  Service: Endoscopy;  Laterality: N/A;       Review of patient's allergies indicates:   Allergen Reactions    [place] Swelling    Pecan nut Swelling    Penicillins Hives     Tolerates cephalosporins    Tolerated  piperacillin/tazobactam 11/2023    Sulfa (sulfonamide antibiotics) Hives    Opioids - morphine analogues Itching, Nausea And Vomiting and Rash    Tolonium chloride(toluidine blue-o) Hives, Itching and Rash    Toradol [ketorolac] Rash       Current Facility-Administered Medications on File Prior to Encounter   Medication    [COMPLETED] famotidine (PF) injection 20 mg    [COMPLETED] HYDROmorphone (PF) injection 1 mg    [COMPLETED] HYDROmorphone (PF) injection 2 mg    [COMPLETED] lactated ringers bolus 1,000 mL    [COMPLETED] pantoprazole injection 40 mg    [DISCONTINUED] HYDROmorphone injection 1 mg    [DISCONTINUED] HYDROmorphone injection 2 mg     Current Outpatient Medications on File Prior to Encounter   Medication Sig    ekhcrbsnr-etupdzxw-zrdndya ala (BIKTARVY) -25 mg (25 kg or greater) Take 1 tablet by mouth once daily.    ondansetron (ZOFRAN-ODT) 4 MG TbDL Take 1 tablet (4 mg total) by mouth every 6 (six) hours as needed (Nausea).    oxyCODONE (ROXICODONE) 5 MG immediate release tablet Take 1 tablet (5 mg total) by mouth every 4 (four) hours as needed for Pain.    pantoprazole (PROTONIX) 40 MG tablet Take 1 tablet (40 mg total) by mouth once daily.    promethazine (PHENERGAN) 25 MG tablet Take 1 tablet (25 mg total) by mouth every 4 (four) hours.    promethazine (PHENERGAN) 25 MG tablet Take 1 tablet (25 mg total) by mouth every 6 (six) hours as needed for Nausea.     Family History       Problem Relation (Age of Onset)    Breast cancer Maternal Grandmother    Cancer Mother, Brother    No Known Problems Father    Ovarian cancer Mother    Pancreatitis Mother          Tobacco Use    Smoking status: Former     Types: Cigarettes    Smokeless tobacco: Never   Substance and Sexual Activity    Alcohol use: Not Currently    Drug use: Yes     Types: Marijuana    Sexual activity: Not on file     Review of Systems  Objective:     Vital Signs (Most Recent):  Temp: 98.6 °F (37 °C) (03/30/25 1217)  Pulse: 97 (03/30/25  1217)  Resp: 18 (03/30/25 1413)  BP: 136/87 (03/30/25 1217)  SpO2: 100 % (03/30/25 1217) Vital Signs (24h Range):  Temp:  [98.6 °F (37 °C)-99 °F (37.2 °C)] 98.6 °F (37 °C)  Pulse:  [88-97] 97  Resp:  [14-20] 18  SpO2:  [96 %-100 %] 100 %  BP: (136-155)/() 136/87     Weight: 70.3 kg (155 lb)  Body mass index is 22.24 kg/m².     Physical Exam  Vitals and nursing note reviewed.   Constitutional:       General: He is not in acute distress.     Appearance: Normal appearance. He is not ill-appearing.   Eyes:      General: No scleral icterus.  Cardiovascular:      Rate and Rhythm: Regular rhythm. Tachycardia present.      Heart sounds: Normal heart sounds. No murmur heard.  Pulmonary:      Effort: No respiratory distress.      Breath sounds: Normal breath sounds. No wheezing or rhonchi.   Abdominal:      General: Abdomen is flat. Bowel sounds are normal. There is no distension.      Palpations: Abdomen is soft.      Tenderness: There is generalized abdominal tenderness and tenderness in the epigastric area, periumbilical area and left upper quadrant. There is no right CVA tenderness or left CVA tenderness.   Skin:     General: Skin is warm and dry.   Neurological:      General: No focal deficit present.      Mental Status: He is alert and oriented to person, place, and time.   Psychiatric:         Mood and Affect: Mood normal.         Behavior: Behavior normal.                Significant Labs: All pertinent labs within the past 24 hours have been reviewed.  CBC:   Recent Labs   Lab 03/28/25 2123 03/29/25  1322 03/30/25  1257   WBC 5.96 6.19 4.79   HGB 12.9* 12.6* 11.3*   HCT 43.2 43.2 37.0*    200 157     CMP:   Recent Labs   Lab 03/28/25  2253 03/29/25  1427 03/30/25  1257    135* 135*   K 3.9 3.9 3.3*   * 104 101   CO2 14* 23 21*   BUN 4* 8 4*   CREATININE 0.7 0.7 0.7   CALCIUM 7.7* 7.9* 8.6*   ALBUMIN 3.5 3.3* 3.6   BILITOT 0.2 0.5 0.7   ALKPHOS 108 101 115   * 138* 85*   * 121*  104*   ANIONGAP 13 8 13     Lipase:   Recent Labs   Lab 03/28/25  2253 03/29/25  1427 03/30/25  1257   LIPASE 12 86* 167*       Significant Imaging: I have reviewed all pertinent imaging results/findings within the past 24 hours.    CT Abdomen Pelvis  Without Contrast  Narrative: EXAMINATION:  CT ABDOMEN PELVIS WITHOUT CONTRAST    CLINICAL HISTORY:  Epigastric pain;Abdominal pain, acute, nonlocalized;    TECHNIQUE:  Low dose axial images, sagittal and coronal reformations were obtained from the lung bases to the pubic symphysis.  Oral contrast was not administered.    COMPARISON:  09/13/2024    FINDINGS:  Images of the lower thorax are unremarkable.    There is liquid content within the distal esophagus, correlation with any history of gastroesophageal reflux.  The liver is diffusely hypoattenuating suggesting steatosis, correlation with LFTs recommended.  The liver is enlarged.  The spleen is prominent.  There is inflammation about the pancreas noting more focal fluid collection along the anterior aspect of the pancreas at the pancreatic body measuring 1.8 x 1.1 cm.  This likely correlates with prior collection in the region however has significantly decreased in size since that time.  No pancreatic ductal dilation.  There is prominence of the pancreatic head noting some regions of hypoattenuation at the level of the pancreatic head.  The stomach is nondistended, no significant gastric wall thickening.  There are several abdominopelvic and perigastric varices.  There are a few scattered abdominal lymph nodes.  There is high attenuating material within the gallbladder, may reflect vicarious contrast excretion or sludge.    The kidneys have a grossly unremarkable noncontrast appearance without hydronephrosis or nephrolithiasis.  The bilateral ureters are unremarkable without calculi seen.  The urinary bladder is unremarkable.  The prostate is not enlarged.    The large bowel is grossly unremarkable.  The terminal  ileum is unremarkable.  The appendix is unremarkable.  The small bowel is grossly unremarkable.  There are a few scattered shotty periaortic, pericaval, and mesenteric lymph nodes.  No focal organized pelvic fluid collection.    No acute osseous abnormalities.  No significant inguinal lymphadenopathy.  Impression: This report was flagged in Epic as abnormal.    1. Inflammation about the pancreas concerning for developing pancreatitis.  2. There is been significant interval decrease in size of prior pancreatic fluid collection suggesting evolving pseudo cyst.  3. Findings suggest hepatic steatosis noting hepatomegaly, correlation with LFTs recommended.  4. Splenomegaly.  5. Multiple abdominal varices, similar to the previous exam.  6. Please see above for several additional findings.    Electronically signed by: Juan Sexton MD  Date:    03/29/2025  Time:    17:09

## 2025-03-30 NOTE — ASSESSMENT & PLAN NOTE
Patient presenting with characteristic abdominal pain for pancreatitis with elevated lipase and history of both chronic and necrotizing pancreatitis. Lipid panel has been grossly negative in the past. Has remote history of EtOH per documentation. CTAP 3/29 concerning for developing pseudocyst.  - supportive care:   cIVF   Dilaudid IV 2mg q4h prn until able to tolerate PO   Patient allergic to toradol   No scheduled tylenol due to elevation in LFTs   Antiemetics   - lipid panel, UDS, EtOH

## 2025-03-30 NOTE — PLAN OF CARE
Problem: Adult Inpatient Plan of Care  Goal: Plan of Care Review  Outcome: Progressing  Goal: Patient-Specific Goal (Individualized)  Outcome: Progressing  Goal: Absence of Hospital-Acquired Illness or Injury  Outcome: Progressing  Goal: Optimal Comfort and Wellbeing  Outcome: Progressing  Goal: Readiness for Transition of Care  Outcome: Progressing     Problem: Pain Chronic (Persistent)  Goal: Optimal Pain Control and Function  Outcome: Progressing     Problem: Pancreatitis  Goal: Fluid and Electrolyte Balance  Outcome: Progressing  Goal: Absence of Infection Signs and Symptoms  Outcome: Progressing  Goal: Optimal Nutrition Delivery  Outcome: Progressing  Goal: Optimal Pain Control and Function  Outcome: Progressing  Goal: Effective Oxygenation and Ventilation  Outcome: Progressing

## 2025-03-30 NOTE — ED NOTES
Patient comes into the emergency department by POV with complaints of pancreatitis. Patient states that he was seen here yesterday, diagnosed with pancreatitis but refused to be admitted. Pt reports he had to get , came back today for admission. Endorses N/V abdominal pain 10/10. Patient denies SOB, CP, diarrhea.

## 2025-03-30 NOTE — PLAN OF CARE
[facility] - Observation 11H  Initial Discharge Assessment       Primary Care Provider: Pina Jones NP    Admission Diagnosis: Epigastric pain [R10.13]  Chest pain [R07.9]  Acute on chronic pancreatitis [K85.90, K86.1]  HIV infection, unspecified symptom status [Z21]  Nausea and vomiting, unspecified vomiting type [R11.2]    Admission Date: 3/30/2025  Expected Discharge Date: 4/1/2025    Pt stated he is independent with his ambulation and ADL's and does not require assistance or equipment.    Post acute services discussed and declined at this time    Transition of Care Barriers: (P) None    Payor: MEDICAID / Plan: [facility] / Product Type: Managed Medicaid /     Extended Emergency Contact Information  Primary Emergency Contact: Denice Cardona  Mobile Phone: [phone]  Relation: Mother  Preferred language: English   needed? No    Discharge Plan A: (P) Home  Discharge Plan B: (P) Home      [id number] - [place], [place] - [address] AT [facility] & [address]  [address]  [facility] [address]  Phone: [phone] Fax: [phone]    [facility] [place] - [address]  [address] [facility] [address]  Phone: [phone] Fax: [phone]    [facility]  [address] [facility] [address]  Phone: [phone] Fax: [phone]      Initial Assessment (most recent)       Adult Discharge Assessment - 03/30/25 1539          Discharge Assessment    Assessment Type Discharge Planning Assessment (P)      Confirmed/corrected address, phone number and insurance Yes (P)      Confirmed Demographics Correct on Facesheet (P)      Source of Information patient (P)      Reason For Admission Acute on chronic pancreatitis (P)      People in Home alone (P)      Facility Arrived From: home (P)      Do you expect to return to your current living situation? Yes (P)      Do you have  help at home or someone to help you manage your care at home? No (P)      Prior to hospitilization cognitive status: No Deficits;Alert/Oriented (P)      Current cognitive status: Alert/Oriented;No Deficits (P)      Walking or Climbing Stairs Difficulty no (P)      Dressing/Bathing Difficulty no (P)      Home Accessibility not wheelchair accessible;stairs to enter home (P)      Number of Stairs, Main Entrance other (see comments) (P)    2nd floor walk-up about 15-20 steps    Home Layout Able to live on 1st floor (P)      Equipment Currently Used at Home none (P)      Patient currently being followed by outpatient case management? No (P)      Do you currently have service(s) that help you manage your care at home? No (P)      Do you have any problems affording any of your prescribed medications? No (P)      Is the patient taking medications as prescribed? yes (P)      Who is going to help you get home at discharge? family/friends (P)      How do you get to doctors appointments? car, drives self (P)      Are you on dialysis? No (P)      Do you take coumadin? No (P)      Discharge Plan A Home (P)      Discharge Plan B Home (P)      DME Needed Upon Discharge  none (P)      Discharge Plan discussed with: Patient (P)      Transition of Care Barriers None (P)         Physical Activity    On average, how many days per week do you engage in moderate to strenuous exercise (like a brisk walk)? 0 days (P)      On average, how many minutes do you engage in exercise at this level? 0 min (P)         Financial Resource Strain    How hard is it for you to pay for the very basics like food, housing, medical care, and heating? Not very hard (P)         Housing Stability    In the last 12 months, was there a time when you were not able to pay the mortgage or rent on time? No (P)      At any time in the past 12 months, were you homeless or living in a shelter (including now)? No (P)         Transportation Needs    In the past 12 months,  has lack of transportation kept you from medical appointments or from getting medications? No (P)      In the past 12 months, has lack of transportation kept you from meetings, work, or from getting things needed for daily living? No (P)         Food Insecurity    Within the past 12 months, you worried that your food would run out before you got the money to buy more. Never true (P)      Within the past 12 months, the food you bought just didn't last and you didn't have money to get more. Never true (P)         Stress    Do you feel stress - tense, restless, nervous, or anxious, or unable to sleep at night because your mind is troubled all the time - these days? Only a little (P)         Social Isolation    How often do you feel lonely or isolated from those around you?  Never (P)         Alcohol Use    Q1: How often do you have a drink containing alcohol? Never (P)      Q2: How many drinks containing alcohol do you have on a typical day when you are drinking? Patient does not drink (P)      Q3: How often do you have six or more drinks on one occasion? Never (P)         Utilities    In the past 12 months has the electric, gas, oil, or water company threatened to shut off services in your home? No (P)         Health Literacy    How often do you need to have someone help you when you read instructions, pamphlets, or other written material from your doctor or pharmacy? Rarely (P)                       Discharge Plan A and Plan B have been determined by review of patient's clinical status, future medical and therapeutic needs, and coverage/benefits for post-acute care in coordination with multidisciplinary team members.    Shamika Gallegos, WILLIAM, MSW, LMSW, RSW   Case Management  [facility]  Email: [email or web address]

## 2025-03-30 NOTE — ASSESSMENT & PLAN NOTE
Patient's most recent potassium results are listed below. Likely secondary to emesis.   Recent Labs     03/28/25  2253 03/29/25  1427 03/30/25  1257   K 3.9 3.9 3.3*     Plan  - Replete potassium per protocol  - Monitor potassium Daily  - Patient's hypokalemia is worsening. Will continue current treatment

## 2025-03-30 NOTE — HPI
Tasia Cardona is a 24 y.o. male w/ PMHx of HIV on HAART, chronic pancreatitis with necrotizing component, splenic vein thrombosis (not on AC), polycythemia vera, and asthma presenting with 4 days of worsening abdominal pain. He has presented to the ED for uncontrolled pain the past 3 days, associated with nausea and vomiting. Pain is worsen when reclining and with movement. Patient reports that pain has progressed from LUQ to epigastric and and towards the RUQ. This is similar to his previous episodes of pancreatitis. He denies fevers, chills, chest pain, or difficulty breathing. He has had some diarrhea recently but none on day of admission.     ED: given IVF and dilaudid. Lipase increasing past 3 days. LFTs highest 3/28 - down trending but elevated. CTAP 3/29 with increasing fluid collection concerning for developing pseudocyst.

## 2025-03-30 NOTE — ASSESSMENT & PLAN NOTE
New this admission. Improved from 3/28 but still elevated. Patient developed RUQ pain on 3/30. He does have history of positive hep C antibodies and imaging findings of hepatic steatosis on previous admits.   - RUQUS ordered  - daily CMP

## 2025-03-30 NOTE — H&P
[facility] - Emergency Dept  [facility] Medicine  History & Physical    Patient Name: Tasia Cardona  MRN: [id number]  Patient Class: OP- Observation  Admission Date: 3/30/2025  Attending Physician: Marsha Navarrete MD   Primary Care Provider: Pina Jones NP         Patient information was obtained from patient, past medical records, and ER records.     Subjective:     Principal Problem:Acute on chronic pancreatitis    Chief Complaint:   Chief Complaint   Patient presents with    Abdominal Pain    Vomiting     States dx with pancreatitis yesterday supposed to be admitted but left         HPI: Tasia Cardona is a 24 y.o. male w/ PMHx of HIV on HAART, chronic pancreatitis with necrotizing component, splenic vein thrombosis (not on AC), polycythemia vera, and asthma presenting with 4 days of worsening abdominal pain. He has presented to the ED for uncontrolled pain the past 3 days, associated with nausea and vomiting. Pain is worsen when reclining and with movement. Patient reports that pain has progressed from LUQ to epigastric and and towards the RUQ. This is similar to his previous episodes of pancreatitis. He denies fevers, chills, chest pain, or difficulty breathing. He has had some diarrhea recently but none on day of admission.     ED: given IVF and dilaudid. Lipase increasing past 3 days. LFTs highest 3/28 - down trending but elevated. CTAP 3/29 with increasing fluid collection concerning for developing pseudocyst.     Past Medical History:   Diagnosis Date    Acute necrotizing pancreatitis 09/20/2023    Alcohol use disorder 10/05/2023    Asthma     Hepatitis C antibody positive in blood 09/18/2023 9/2023 PCR negative    HIV infection 10/2021    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       Past  Surgical History:   Procedure Laterality Date    ENDOSCOPIC ULTRASOUND OF UPPER GASTROINTESTINAL TRACT N/A 10/23/2023    Procedure: ULTRASOUND, UPPER GI TRACT, ENDOSCOPIC;  Surgeon: Emil Villatoro MD;  Location: [facility] (2ND FLR);  Service: Endoscopy;  Laterality: N/A;    ENDOSCOPIC ULTRASOUND OF UPPER GASTROINTESTINAL TRACT N/A 7/24/2024    Procedure: ULTRASOUND, UPPER GI TRACT, ENDOSCOPIC;  Surgeon: Faustino Trujillo MD;  Location: [facility] ENDO (2ND FLR);  Service: Endoscopy;  Laterality: N/A;    ERCP N/A 10/23/2023    Procedure: ERCP (ENDOSCOPIC RETROGRADE CHOLANGIOPANCREATOGRAPHY);  Surgeon: Emil Villatoro MD;  Location: [facility] (2ND FLR);  Service: Endoscopy;  Laterality: N/A;    ESOPHAGOGASTRODUODENOSCOPY N/A 9/18/2023    Procedure: EGD (ESOPHAGOGASTRODUODENOSCOPY);  Surgeon: Kg Cleaning MD;  Location: [facility] (2ND FLR);  Service: Endoscopy;  Laterality: N/A;    ESOPHAGOGASTRODUODENOSCOPY N/A 3/8/2024    Procedure: EGD (ESOPHAGOGASTRODUODENOSCOPY);  Surgeon: Greg Love MD;  Location: [facility] (2ND FLR);  Service: Endoscopy;  Laterality: N/A;    ESOPHAGOGASTRODUODENOSCOPY N/A 7/24/2024    Procedure: EGD (ESOPHAGOGASTRODUODENOSCOPY);  Surgeon: Faustino Trujillo MD;  Location: [facility] (2ND FLR);  Service: Endoscopy;  Laterality: N/A;       Review of patient's allergies indicates:   Allergen Reactions    [place] Swelling    Pecan nut Swelling    Penicillins Hives     Tolerates cephalosporins    Tolerated piperacillin/tazobactam 11/2023    Sulfa (sulfonamide antibiotics) Hives    Opioids - morphine analogues Itching, Nausea And Vomiting and Rash    Tolonium chloride(toluidine blue-o) Hives, Itching and Rash    Toradol [ketorolac] Rash       Current Facility-Administered Medications on File Prior to Encounter   Medication    [COMPLETED] famotidine (PF) injection 20 mg    [COMPLETED] HYDROmorphone (PF) injection 1 mg    [COMPLETED] HYDROmorphone (PF) injection 2 mg    [COMPLETED] lactated  ringers bolus 1,000 mL    [COMPLETED] pantoprazole injection 40 mg    [DISCONTINUED] HYDROmorphone injection 1 mg    [DISCONTINUED] HYDROmorphone injection 2 mg     Current Outpatient Medications on File Prior to Encounter   Medication Sig    kfpcltmty-epqaxhph-yjowdwf ala (BIKTARVY) -25 mg (25 kg or greater) Take 1 tablet by mouth once daily.    ondansetron (ZOFRAN-ODT) 4 MG TbDL Take 1 tablet (4 mg total) by mouth every 6 (six) hours as needed (Nausea).    oxyCODONE (ROXICODONE) 5 MG immediate release tablet Take 1 tablet (5 mg total) by mouth every 4 (four) hours as needed for Pain.    pantoprazole (PROTONIX) 40 MG tablet Take 1 tablet (40 mg total) by mouth once daily.    promethazine (PHENERGAN) 25 MG tablet Take 1 tablet (25 mg total) by mouth every 4 (four) hours.    promethazine (PHENERGAN) 25 MG tablet Take 1 tablet (25 mg total) by mouth every 6 (six) hours as needed for Nausea.     Family History       Problem Relation (Age of Onset)    Breast cancer Maternal Grandmother    Cancer Mother, Brother    No Known Problems Father    Ovarian cancer Mother    Pancreatitis Mother          Tobacco Use    Smoking status: Former     Types: Cigarettes    Smokeless tobacco: Never   Substance and Sexual Activity    Alcohol use: Not Currently    Drug use: Yes     Types: Marijuana    Sexual activity: Not on file     Review of Systems  Objective:     Vital Signs (Most Recent):  Temp: 98.6 °F (37 °C) (03/30/25 1217)  Pulse: 97 (03/30/25 1217)  Resp: 18 (03/30/25 1413)  BP: 136/87 (03/30/25 1217)  SpO2: 100 % (03/30/25 1217) Vital Signs (24h Range):  Temp:  [98.6 °F (37 °C)-99 °F (37.2 °C)] 98.6 °F (37 °C)  Pulse:  [88-97] 97  Resp:  [14-20] 18  SpO2:  [96 %-100 %] 100 %  BP: (136-155)/() 136/87     Weight: 70.3 kg (155 lb)  Body mass index is 22.24 kg/m².     Physical Exam  Vitals and nursing note reviewed.   Constitutional:       General: He is not in acute distress.     Appearance: Normal appearance. He is not  ill-appearing.   Eyes:      General: No scleral icterus.  Cardiovascular:      Rate and Rhythm: Regular rhythm. Tachycardia present.      Heart sounds: Normal heart sounds. No murmur heard.  Pulmonary:      Effort: No respiratory distress.      Breath sounds: Normal breath sounds. No wheezing or rhonchi.   Abdominal:      General: Abdomen is flat. Bowel sounds are normal. There is no distension.      Palpations: Abdomen is soft.      Tenderness: There is generalized abdominal tenderness and tenderness in the epigastric area, periumbilical area and left upper quadrant. There is no right CVA tenderness or left CVA tenderness.   Skin:     General: Skin is warm and dry.   Neurological:      General: No focal deficit present.      Mental Status: He is alert and oriented to person, place, and time.   Psychiatric:         Mood and Affect: Mood normal.         Behavior: Behavior normal.                Significant Labs: All pertinent labs within the past 24 hours have been reviewed.  CBC:   Recent Labs   Lab 03/28/25 2123 03/29/25  1322 03/30/25  1257   WBC 5.96 6.19 4.79   HGB 12.9* 12.6* 11.3*   HCT 43.2 43.2 37.0*    200 157     CMP:   Recent Labs   Lab 03/28/25  2253 03/29/25  1427 03/30/25  1257    135* 135*   K 3.9 3.9 3.3*   * 104 101   CO2 14* 23 21*   BUN 4* 8 4*   CREATININE 0.7 0.7 0.7   CALCIUM 7.7* 7.9* 8.6*   ALBUMIN 3.5 3.3* 3.6   BILITOT 0.2 0.5 0.7   ALKPHOS 108 101 115   * 138* 85*   * 121* 104*   ANIONGAP 13 8 13     Lipase:   Recent Labs   Lab 03/28/25  2253 03/29/25  1427 03/30/25  1257   LIPASE 12 86* 167*       Significant Imaging: I have reviewed all pertinent imaging results/findings within the past 24 hours.    CT Abdomen Pelvis  Without Contrast  Narrative: EXAMINATION:  CT ABDOMEN PELVIS WITHOUT CONTRAST    CLINICAL HISTORY:  Epigastric pain;Abdominal pain, acute, nonlocalized;    TECHNIQUE:  Low dose axial images, sagittal and coronal reformations were obtained  from the lung bases to the pubic symphysis.  Oral contrast was not administered.    COMPARISON:  09/13/2024    FINDINGS:  Images of the lower thorax are unremarkable.    There is liquid content within the distal esophagus, correlation with any history of gastroesophageal reflux.  The liver is diffusely hypoattenuating suggesting steatosis, correlation with LFTs recommended.  The liver is enlarged.  The spleen is prominent.  There is inflammation about the pancreas noting more focal fluid collection along the anterior aspect of the pancreas at the pancreatic body measuring 1.8 x 1.1 cm.  This likely correlates with prior collection in the region however has significantly decreased in size since that time.  No pancreatic ductal dilation.  There is prominence of the pancreatic head noting some regions of hypoattenuation at the level of the pancreatic head.  The stomach is nondistended, no significant gastric wall thickening.  There are several abdominopelvic and perigastric varices.  There are a few scattered abdominal lymph nodes.  There is high attenuating material within the gallbladder, may reflect vicarious contrast excretion or sludge.    The kidneys have a grossly unremarkable noncontrast appearance without hydronephrosis or nephrolithiasis.  The bilateral ureters are unremarkable without calculi seen.  The urinary bladder is unremarkable.  The prostate is not enlarged.    The large bowel is grossly unremarkable.  The terminal ileum is unremarkable.  The appendix is unremarkable.  The small bowel is grossly unremarkable.  There are a few scattered shotty periaortic, pericaval, and mesenteric lymph nodes.  No focal organized pelvic fluid collection.    No acute osseous abnormalities.  No significant inguinal lymphadenopathy.  Impression: This report was flagged in Epic as abnormal.    1. Inflammation about the pancreas concerning for developing pancreatitis.  2. There is been significant interval decrease in size  of prior pancreatic fluid collection suggesting evolving pseudo cyst.  3. Findings suggest hepatic steatosis noting hepatomegaly, correlation with LFTs recommended.  4. Splenomegaly.  5. Multiple abdominal varices, similar to the previous exam.  6. Please see above for several additional findings.    Electronically signed by: Juan Sexton MD  Date:    03/29/2025  Time:    17:09      Assessment/Plan:     Assessment & Plan  Acute on chronic pancreatitis  Nausea and vomiting  Fluid collection of pancreas  Patient presenting with characteristic abdominal pain for pancreatitis with elevated lipase and history of both chronic and necrotizing pancreatitis. Lipid panel has been grossly negative in the past. Has remote history of EtOH per documentation. CTAP 3/29 concerning for developing pseudocyst.  - supportive care:   cIVF   Dilaudid IV 2mg q4h prn until able to tolerate PO   Patient allergic to toradol   No scheduled tylenol due to elevation in LFTs   Antiemetics   - lipid panel, UDS, EtOH    LFT elevation  New this admission. Improved from 3/28 but still elevated. Patient developed RUQ pain on 3/30. He does have history of positive hep C antibodies and imaging findings of hepatic steatosis on previous admits.   - RUQUS ordered  - daily CMP    HIV infection  - continue HAART    Polycythemia vera  History noted.     Mild intermittent asthma without complication  No respiratory distress at this time.     Hypokalemia  Patient's most recent potassium results are listed below. Likely secondary to emesis.   Recent Labs     03/28/25  2253 03/29/25  1427 03/30/25  1257   K 3.9 3.9 3.3*     Plan  - Replete potassium per protocol  - Monitor potassium Daily  - Patient's hypokalemia is worsening. Will continue current treatment  VTE Risk Mitigation (From admission, onward)           Ordered     enoxaparin injection 40 mg  Daily         03/30/25 1433     IP VTE HIGH RISK PATIENT  Once         03/30/25 1433     Place sequential  compression device  Until discontinued         03/30/25 1433                       On 03/30/2025, patient should be placed in hospital observation services under my care.             Marsha Navarrete MD  Department of Hospital Medicine  [facility] - Emergency Dept

## 2025-03-31 LAB
ABSOLUTE EOSINOPHIL (OHS): 0.13 K/UL
ABSOLUTE MONOCYTE (OHS): 0.47 K/UL (ref 0.3–1)
ABSOLUTE NEUTROPHIL COUNT (OHS): 1.68 K/UL (ref 1.8–7.7)
ALBUMIN SERPL BCP-MCNC: 3 G/DL (ref 3.5–5.2)
ALP SERPL-CCNC: 97 UNIT/L (ref 40–150)
ALT SERPL W/O P-5'-P-CCNC: 77 UNIT/L (ref 10–44)
ANION GAP (OHS): 6 MMOL/L (ref 8–16)
AST SERPL-CCNC: 62 UNIT/L (ref 11–45)
BASOPHILS # BLD AUTO: 0.01 K/UL
BASOPHILS NFR BLD AUTO: 0.2 %
BILIRUB SERPL-MCNC: 0.4 MG/DL (ref 0.1–1)
BUN SERPL-MCNC: 8 MG/DL (ref 6–20)
BUN SERPL-MCNC: <3 MG/DL (ref 6–30)
BUN SERPL-MCNC: <3 MG/DL (ref 6–30)
CALCIUM SERPL-MCNC: 8.3 MG/DL (ref 8.7–10.5)
CHLORIDE SERPL-SCNC: 100 MMOL/L (ref 95–110)
CHLORIDE SERPL-SCNC: 101 MMOL/L (ref 95–110)
CHLORIDE SERPL-SCNC: 108 MMOL/L (ref 95–110)
CO2 SERPL-SCNC: 27 MMOL/L (ref 23–29)
CREAT SERPL-MCNC: 0.6 MG/DL (ref 0.5–1.4)
CREAT SERPL-MCNC: 0.7 MG/DL (ref 0.5–1.4)
CREAT SERPL-MCNC: 1 MG/DL (ref 0.5–1.4)
ERYTHROCYTE [DISTWIDTH] IN BLOOD BY AUTOMATED COUNT: 17.9 % (ref 11.5–14.5)
GFR SERPLBLD CREATININE-BSD FMLA CKD-EPI: >60 ML/MIN/1.73/M2
GLUCOSE SERPL-MCNC: 123 MG/DL (ref 70–110)
GLUCOSE SERPL-MCNC: 132 MG/DL (ref 70–110)
GLUCOSE SERPL-MCNC: 99 MG/DL (ref 70–110)
HCT VFR BLD AUTO: 33.9 % (ref 40–54)
HCT VFR BLD CALC: 37 %PCV (ref 36–54)
HCT VFR BLD CALC: 39 %PCV (ref 36–54)
HGB BLD-MCNC: 10.2 GM/DL (ref 14–18)
IMM GRANULOCYTES # BLD AUTO: 0.01 K/UL (ref 0–0.04)
IMM GRANULOCYTES NFR BLD AUTO: 0.2 % (ref 0–0.5)
LYMPHOCYTES # BLD AUTO: 1.72 K/UL (ref 1–4.8)
MAGNESIUM SERPL-MCNC: 1.7 MG/DL (ref 1.6–2.6)
MCH RBC QN AUTO: 24.5 PG (ref 27–50)
MCHC RBC AUTO-ENTMCNC: 30.1 G/DL (ref 32–36)
MCV RBC AUTO: 82 FL (ref 82–98)
NUCLEATED RBC (/100WBC) (OHS): 0 /100 WBC
PHOSPHATE SERPL-MCNC: 5 MG/DL (ref 2.7–4.5)
PLATELET # BLD AUTO: 150 K/UL (ref 150–450)
PMV BLD AUTO: 11 FL (ref 9.2–12.9)
POC IONIZED CALCIUM: 0.96 MMOL/L (ref 1.06–1.42)
POC IONIZED CALCIUM: 1.04 MMOL/L (ref 1.06–1.42)
POC TCO2 (MEASURED): 22 MMOL/L (ref 23–29)
POC TCO2 (MEASURED): 24 MMOL/L (ref 23–29)
POTASSIUM BLD-SCNC: 3 MMOL/L (ref 3.5–5.1)
POTASSIUM BLD-SCNC: 3.3 MMOL/L (ref 3.5–5.1)
POTASSIUM SERPL-SCNC: 3.7 MMOL/L (ref 3.5–5.1)
PROT SERPL-MCNC: 6 GM/DL (ref 6–8.4)
RBC # BLD AUTO: 4.16 M/UL (ref 4.6–6.2)
RELATIVE EOSINOPHIL (OHS): 3.2 %
RELATIVE LYMPHOCYTE (OHS): 42.8 % (ref 18–48)
RELATIVE MONOCYTE (OHS): 11.7 % (ref 4–15)
RELATIVE NEUTROPHIL (OHS): 41.9 % (ref 38–73)
SAMPLE: ABNORMAL
SAMPLE: ABNORMAL
SODIUM BLD-SCNC: 135 MMOL/L (ref 136–145)
SODIUM BLD-SCNC: 144 MMOL/L (ref 136–145)
SODIUM SERPL-SCNC: 134 MMOL/L (ref 136–145)
WBC # BLD AUTO: 4.02 K/UL (ref 3.9–12.7)

## 2025-03-31 PROCEDURE — 63600175 PHARM REV CODE 636 W HCPCS: Performed by: STUDENT IN AN ORGANIZED HEALTH CARE EDUCATION/TRAINING PROGRAM

## 2025-03-31 PROCEDURE — 96361 HYDRATE IV INFUSION ADD-ON: CPT

## 2025-03-31 PROCEDURE — 83735 ASSAY OF MAGNESIUM: CPT | Performed by: STUDENT IN AN ORGANIZED HEALTH CARE EDUCATION/TRAINING PROGRAM

## 2025-03-31 PROCEDURE — G0378 HOSPITAL OBSERVATION PER HR: HCPCS

## 2025-03-31 PROCEDURE — 85025 COMPLETE CBC W/AUTO DIFF WBC: CPT | Performed by: STUDENT IN AN ORGANIZED HEALTH CARE EDUCATION/TRAINING PROGRAM

## 2025-03-31 PROCEDURE — 25000003 PHARM REV CODE 250: Performed by: STUDENT IN AN ORGANIZED HEALTH CARE EDUCATION/TRAINING PROGRAM

## 2025-03-31 PROCEDURE — 96376 TX/PRO/DX INJ SAME DRUG ADON: CPT

## 2025-03-31 PROCEDURE — 36415 COLL VENOUS BLD VENIPUNCTURE: CPT | Performed by: STUDENT IN AN ORGANIZED HEALTH CARE EDUCATION/TRAINING PROGRAM

## 2025-03-31 PROCEDURE — 84100 ASSAY OF PHOSPHORUS: CPT | Performed by: STUDENT IN AN ORGANIZED HEALTH CARE EDUCATION/TRAINING PROGRAM

## 2025-03-31 PROCEDURE — 25000003 PHARM REV CODE 250

## 2025-03-31 PROCEDURE — 80053 COMPREHEN METABOLIC PANEL: CPT | Performed by: STUDENT IN AN ORGANIZED HEALTH CARE EDUCATION/TRAINING PROGRAM

## 2025-03-31 RX ORDER — DIPHENHYDRAMINE HCL 25 MG
25 CAPSULE ORAL EVERY 6 HOURS PRN
Status: DISCONTINUED | OUTPATIENT
Start: 2025-03-31 | End: 2025-04-01 | Stop reason: HOSPADM

## 2025-03-31 RX ADMIN — BICTEGRAVIR SODIUM, EMTRICITABINE, AND TENOFOVIR ALAFENAMIDE FUMARATE 1 TABLET: 50; 200; 25 TABLET ORAL at 08:03

## 2025-03-31 RX ADMIN — HYDROMORPHONE HYDROCHLORIDE 2 MG: 2 INJECTION, SOLUTION INTRAMUSCULAR; INTRAVENOUS; SUBCUTANEOUS at 01:03

## 2025-03-31 RX ADMIN — LIDOCAINE 1 PATCH: 50 PATCH CUTANEOUS at 05:03

## 2025-03-31 RX ADMIN — ONDANSETRON 4 MG: 2 INJECTION INTRAMUSCULAR; INTRAVENOUS at 05:03

## 2025-03-31 RX ADMIN — DIPHENHYDRAMINE HYDROCHLORIDE 25 MG: 25 CAPSULE ORAL at 01:03

## 2025-03-31 RX ADMIN — HYDROMORPHONE HYDROCHLORIDE 2 MG: 2 INJECTION, SOLUTION INTRAMUSCULAR; INTRAVENOUS; SUBCUTANEOUS at 04:03

## 2025-03-31 RX ADMIN — SODIUM CHLORIDE, POTASSIUM CHLORIDE, SODIUM LACTATE AND CALCIUM CHLORIDE: 600; 310; 30; 20 INJECTION, SOLUTION INTRAVENOUS at 05:03

## 2025-03-31 RX ADMIN — HYDROMORPHONE HYDROCHLORIDE 2 MG: 2 INJECTION, SOLUTION INTRAMUSCULAR; INTRAVENOUS; SUBCUTANEOUS at 05:03

## 2025-03-31 RX ADMIN — SENNOSIDES AND DOCUSATE SODIUM 1 TABLET: 50; 8.6 TABLET ORAL at 09:03

## 2025-03-31 RX ADMIN — HYDROMORPHONE HYDROCHLORIDE 2 MG: 2 INJECTION, SOLUTION INTRAMUSCULAR; INTRAVENOUS; SUBCUTANEOUS at 12:03

## 2025-03-31 RX ADMIN — ONDANSETRON 4 MG: 2 INJECTION INTRAMUSCULAR; INTRAVENOUS at 09:03

## 2025-03-31 RX ADMIN — DIPHENHYDRAMINE HYDROCHLORIDE 25 MG: 25 CAPSULE ORAL at 06:03

## 2025-03-31 RX ADMIN — HYDROMORPHONE HYDROCHLORIDE 2 MG: 2 INJECTION, SOLUTION INTRAMUSCULAR; INTRAVENOUS; SUBCUTANEOUS at 09:03

## 2025-03-31 RX ADMIN — HYDROMORPHONE HYDROCHLORIDE 2 MG: 2 INJECTION, SOLUTION INTRAMUSCULAR; INTRAVENOUS; SUBCUTANEOUS at 08:03

## 2025-03-31 NOTE — ASSESSMENT & PLAN NOTE
Patient presenting with characteristic abdominal pain for pancreatitis with elevated lipase and history of both chronic and necrotizing pancreatitis. Lipid panel has been grossly negative in the past. Has remote history of EtOH per documentation. CTAP 3/29 concerning for developing pseudocyst.  - supportive care:   cIVF   Dilaudid IV 2mg q4h prn until able to tolerate PO   Patient allergic to toradol   No scheduled tylenol due to elevation in LFTs   Antiemetics   - lipid panel wnl

## 2025-03-31 NOTE — PROGRESS NOTES
[facility] Observation [address] [place] [address] Medicine  Progress Note    Patient Name: Tasia Cardona  MRN: [id number]  Patient Class: OP- Observation   Admission Date: 3/30/2025  Length of Stay: 0 days  Attending Physician: Marsha Navarrete MD  Primary Care Provider: Pina Jones NP        Subjective     Principal Problem:Acute on chronic pancreatitis        HPI:  Tasia Cardona is a 24 y.o. male w/ PMHx of HIV on HAART, chronic pancreatitis with necrotizing component, splenic vein thrombosis (not on AC), polycythemia vera, and asthma presenting with 4 days of worsening abdominal pain. He has presented to the ED for uncontrolled pain the past 3 days, associated with nausea and vomiting. Pain is worsen when reclining and with movement. Patient reports that pain has progressed from LUQ to epigastric and and towards the RUQ. This is similar to his previous episodes of pancreatitis. He denies fevers, chills, chest pain, or difficulty breathing. He has had some diarrhea recently but none on day of admission.     ED: given IVF and dilaudid. Lipase increasing past 3 days. LFTs highest 3/28 - down trending but elevated. CTAP 3/29 with increasing fluid collection concerning for developing pseudocyst.     Overview/Hospital Course:  No notes on file    Interval History: No acute events overnight. UOP steady. Abdominal pain slightly improved. Worse with eating. RUQUS similar to previous.     Review of Systems  Objective:     Vital Signs (Most Recent):  Temp: 97.3 °F (36.3 °C) (03/31/25 0742)  Pulse: 63 (03/31/25 0742)  Resp: 16 (03/31/25 0859)  BP: (!) 145/83 (03/31/25 0742)  SpO2: (!) 94 % (03/31/25 0742) Vital Signs (24h Range):  Temp:  [97.3 °F (36.3 °C)-98.9 °F (37.2 °C)] 97.3 °F (36.3 °C)  Pulse:  [63-97] 63  Resp:  [16-20] 16  SpO2:  [94 %-100 %] 94 %  BP: (129-145)/(74-87) 145/83     Weight: 70.3 kg (155 lb)  Body mass index is 22.24 kg/m².  No intake or output data in the 24 hours ending 03/31/25 1035      Physical  Exam  Vitals and nursing note reviewed.   Constitutional:       General: He is not in acute distress.     Appearance: Normal appearance. He is not ill-appearing.   Eyes:      General: No scleral icterus.  Cardiovascular:      Rate and Rhythm: Normal rate and regular rhythm.      Heart sounds: Normal heart sounds. No murmur heard.  Pulmonary:      Effort: No respiratory distress.      Breath sounds: Normal breath sounds. No wheezing or rhonchi.   Abdominal:      General: Abdomen is flat. Bowel sounds are normal. There is no distension.      Palpations: Abdomen is soft.      Tenderness: There is generalized abdominal tenderness and tenderness in the epigastric area, periumbilical area and left upper quadrant. There is no right CVA tenderness or left CVA tenderness.   Skin:     General: Skin is warm and dry.   Neurological:      General: No focal deficit present.      Mental Status: He is alert and oriented to person, place, and time.   Psychiatric:         Mood and Affect: Mood normal.         Behavior: Behavior normal.               Significant Labs: All pertinent labs within the past 24 hours have been reviewed.  CBC:   Recent Labs   Lab 03/29/25  1322 03/30/25  1257 03/30/25  1301 03/31/25  0552   WBC 6.19 4.79  --  4.02   HGB 12.6* 11.3*  --  10.2*   HCT 43.2 37.0* 39 33.9*    157  --  150     CMP:   Recent Labs   Lab 03/29/25  1427 03/30/25  1257 03/31/25  0552   * 135* 134*   K 3.9 3.3* 3.7    101 101   CO2 23 21* 27   BUN 8 4* 8   CREATININE 0.7 0.7 0.6   CALCIUM 7.9* 8.6* 8.3*   ALBUMIN 3.3* 3.6 3.0*   BILITOT 0.5 0.7 0.4   ALKPHOS 101 115 97   * 85* 62*   * 104* 77*   ANIONGAP 8 13 6*       Significant Imaging: I have reviewed all pertinent imaging results/findings within the past 24 hours.  US Abdomen Limited  Narrative: EXAMINATION:  US ABDOMEN LIMITED    CLINICAL HISTORY:  RUQ pain;.    TECHNIQUE:  Limited ultrasound of the right upper quadrant of the abdomen including  pancreas, liver, gallbladder, common bile duct was performed.    COMPARISON:  CT abdomen pelvis from 03/29/2025    FINDINGS:  Liver: Enlarged in size measuring 19.9 cm. Diffuse increased echogenicity consistent with hepatic steatosis.  No focal hepatic lesions.    Gallbladder: Gallbladder is partially distended.  No calculi, wall thickening, or pericholecystic fluid.  No sonographic Bland's sign.    Biliary system: The common duct is not dilated, measuring 4 mm.  No intrahepatic ductal dilatation.    Spleen: Enlarged in size measuring 16.4 x 4.2 cm.    Pancreas: The visualized portions of pancreas appear normal.    Miscellaneous: No ascites.  Impression: Hepatosplenomegaly and hepatic steatosis.    Electronically signed by: Travis Centeno MD  Date:    03/31/2025  Time:    07:54          Assessment & Plan  Acute on chronic pancreatitis  Nausea and vomiting  Fluid collection of pancreas  Patient presenting with characteristic abdominal pain for pancreatitis with elevated lipase and history of both chronic and necrotizing pancreatitis. Lipid panel has been grossly negative in the past. Has remote history of EtOH per documentation. CTAP 3/29 concerning for developing pseudocyst.  - supportive care:   cIVF   Dilaudid IV 2mg q4h prn until able to tolerate PO   Patient allergic to toradol   No scheduled tylenol due to elevation in LFTs   Antiemetics   - lipid panel wnl    LFT elevation  New this admission. Improved from 3/28 but still elevated. Patient developed RUQ pain on 3/30. He does have history of positive hep C antibodies and imaging findings of hepatic steatosis on previous admits. RUQUS similar to previous.   - daily CMP    HIV infection  - continue HAART    Polycythemia vera  History noted.     Recent Labs     03/29/25  1322 03/30/25  1257 03/30/25  1301 03/31/25  0552   HGB 12.6* 11.3*  --  10.2*   HCT 43.2 37.0* 39 33.9*     Plan  - Monitor serial CBC: Daily  - Transfuse PRBC if patient becomes  hemodynamically unstable, symptomatic or H/H drops below 7/21.  Mild intermittent asthma without complication  No respiratory distress at this time.     Hypokalemia  Patient's most recent potassium results are listed below. Likely secondary to emesis.   Recent Labs     03/29/25  1427 03/30/25  1257 03/31/25  0552   K 3.9 3.3* 3.7     Plan  - Replete potassium per protocol  - Monitor potassium Daily  - Patient's hypokalemia is improving  VTE Risk Mitigation (From admission, onward)           Ordered     enoxaparin injection 40 mg  Daily         03/30/25 1433     IP VTE HIGH RISK PATIENT  Once         03/30/25 1433     Place sequential compression device  Until discontinued         03/30/25 1433                    Discharge Planning   CASTILLO: 4/1/2025     Code Status: Full Code   Medical Readiness for Discharge Date:   Discharge Plan A: Home                        Marsha Navarrete MD  Department of Hospital Medicine   [facility]

## 2025-03-31 NOTE — PLAN OF CARE
[facility] - Transplant Stepdown      HOME HEALTH ORDERS  FACE TO FACE ENCOUNTER    Patient Name: Tasia Cardona  YOB: 2000    PCP: Pina Jones NP   PCP Address: [address] [facility] [address]  PCP Phone Number: [phone]  PCP Fax: [phone]    Encounter Date: 10/5/23    Admit to Home Health    Diagnoses:  Active Hospital Problems    Diagnosis  POA    *Acute necrotizing pancreatitis [K85.91]  Yes    Alcohol use disorder [F10.90]  Yes    Pancreatic abscess [K85.90]  Yes    Splenic vein thrombosis [I82.890]  Yes    Hepatitis C antibody positive in blood [R76.8]  Yes    Corinne-Chauhan tear [K22.6]  Yes    Polycythemia [D75.1]  Yes    HIV infection [B20]  Yes      Resolved Hospital Problems   No resolved problems to display.       Follow Up Appointments:  Future Appointments   Date Time Provider Department Center   10/12/2023  4:30 PM Roxy Daniels MD [facility] HC B[facility]   11/9/2023  8:30 AM Parag Bright MD [facility]       Allergies:  Review of patient's allergies indicates:   Allergen Reactions    [place] Swelling    Pcn [penicillins] Hives     Tolerates cephalosporins    Pecan nut Swelling    Soy     Sulfa (sulfonamide antibiotics) Hives       Medications: Review discharge medications with patient and family and provide education.    Current Facility-Administered Medications   Medication Dose Route Frequency Provider Last Rate Last Admin    acetaminophen tablet 1,000 mg  1,000 mg Oral Q8H PRN Toby Thompson MD        aluminum & magnesium hydroxide-simethicone 400-400-40 mg/5 mL suspension 30 mL  30 mL Oral Q6H PRN Toby Thompson MD   30 mL at 10/06/23 0630    tbdabsfjl-vxrncerh-shaywwa ala -25 mg (25 kg or greater) 1 tablet  1 tablet Oral Daily Toby Thompson MD   1 tablet at 10/10/23 0904    ceFEPIme (MAXIPIME) 2 g in dextrose 5 % in water (D5W) 100 mL IVPB (MB+)  2 g Intravenous Q12H Brigida Barrow MD        dextrose 10% bolus 125 mL  125 mL  12.5 g Intravenous PRN Toby Thompson MD        dextrose 10% bolus 250 mL 250 mL  25 g Intravenous PRN Toby Thompson MD        enoxaparin injection 40 mg  40 mg Subcutaneous Daily Toby Thompson MD   40 mg at 10/09/23 1632    folic acid tablet 1 mg  1 mg Oral Daily Toby Thompson MD   1 mg at 10/10/23 0857    glucagon (human recombinant) injection 1 mg  1 mg Intramuscular PRN Toby Thompson MD        glucose chewable tablet 16 g  16 g Oral PRN Toby Thompson MD        glucose chewable tablet 24 g  24 g Oral PRN Toby Thompson MD        HYDROmorphone injection 1 mg  1 mg Intravenous On Call Procedure Brigida Barrow MD        HYDROmorphone tablet 2 mg  2 mg Oral Q3H PRN Brigida Barrow MD   2 mg at 10/10/23 0857    insulin aspart U-100 pen 0-5 Units  0-5 Units Subcutaneous Q6H PRN Toby Thompson MD        LIDOcaine-prilocaine cream   Topical (Top) PRN Brigida Barrow MD        melatonin tablet 6 mg  6 mg Oral Nightly PRN Toby Thompson MD   6 mg at 10/09/23 2243    naloxone 0.4 mg/mL injection 0.02 mg  0.02 mg Intravenous PRN Toby Thompson MD        pantoprazole EC tablet 40 mg  40 mg Oral BID Toby Thompson MD   40 mg at 10/10/23 0857    polyethylene glycol packet 17 g  17 g Oral Daily Toby Thompson MD   17 g at 10/10/23 0857    prochlorperazine injection Soln 5 mg  5 mg Intravenous Q6H PRN Toby Thompson MD   5 mg at 10/09/23 1949    senna-docusate 8.6-50 mg per tablet 1 tablet  1 tablet Oral BID Toby Thompson MD   1 tablet at 10/10/23 0857    sodium chloride 0.9% flush 10 mL  10 mL Intravenous Q6H PRN Toby Thompson MD         Current Discharge Medication List        START taking these medications    Details   dextrose 5 % in water (D5W) PgBk 100 mL with ceFEPIme 2 gram SolR 2 g Inject 2 g into the vein every 12 (twelve) hours.      HYDROmorphone (DILAUDID) 2 MG tablet Take 1 tablet (2 mg total) by mouth every 3 (three) hours as  needed for Pain.  Qty: 30 tablet, Refills: 0    Comments: Quantity prescribed more than 7 day supply? Yes, quantity medically necessary           CONTINUE these medications which have NOT CHANGED    Details   folic acid (FOLVITE) 1 MG tablet Take 1 tablet (1 mg total) by mouth once daily.  Qty: 90 tablet, Refills: 0      pantoprazole (PROTONIX) 40 MG tablet Take 1 tablet (40 mg total) by mouth 2 (two) times daily.  Qty: 180 tablet, Refills: 0           STOP taking these medications       dswoaimpd-gyhebiwu-eciqbrl ala (BIKTARVY) -25 mg (25 kg or greater) Comments:   Reason for Stopping:         ciprofloxacin HCl (CIPRO) 500 MG tablet Comments:   Reason for Stopping:         ibuprofen (ADVIL,MOTRIN) 400 MG tablet Comments:   Reason for Stopping:         metroNIDAZOLE (FLAGYL) 500 MG tablet Comments:   Reason for Stopping:         nicotine (NICODERM CQ) 14 mg/24 hr Comments:   Reason for Stopping:         oxyCODONE (ROXICODONE) 5 MG immediate release tablet Comments:   Reason for Stopping:         promethazine (PHENERGAN) 25 MG tablet Comments:   Reason for Stopping:                 I have seen and examined this patient within the last 30 days. My clinical findings that support the need for the home health skilled services and home bound status are the following:no   Weakness/numbness causing balance and gait disturbance due to Weakness/Debility making it taxing to leave home.     Diet:   regular diet    Labs:    SN to perform labs:   Order the following labs to be drawn on Mondays:   CBC  CMP   CRP    Referrals/ Consults    Nursing home infusion services    Activities:   activity as tolerated    Nursing:   Agency to admit patient within 24 hours of hospital discharge unless specified on physician order or at patient request    SN to complete comprehensive assessment including routine vital signs. Instruct on disease process and s/s of complications to report to MD. Review/verify medication list sent home with  the patient at time of discharge  and instruct patient/caregiver as needed. Frequency may be adjusted depending on start of care date.     Skilled nurse to perform up to 3 visits PRN for symptoms related to diagnosis    Notify MD if SBP > 160 or < 90; DBP > 90 or < 50; HR > 120 or < 50; Temp > 101; O2 < 88%; Other:       Ok to schedule additional visits based on staff availability and patient request on consecutive days within the home health episode.    When multiple disciplines ordered:    Start of Care occurs on Sunday - Wednesday schedule remaining discipline evaluations as ordered on separate consecutive days following the start of care.    Thursday SOC -schedule subsequent evaluations Friday and Monday the following week.     Friday - Saturday SOC - schedule subsequent discipline evaluations on consecutive days starting Monday of the following week.    For all post-discharge communication and subsequent orders please contact patient's primary care physician. If unable to reach primary care physician or do not receive response within 30 minutes, please contact [phone] for clinical staff order clarification    Miscellaneous   Home Infusion Therapy:   SN to perform Infusion Therapy/Central Line Care.  Review Central Line Care & Central Line Flush with patient.    Administer (drug and dose): 10/11/2023    Last dose given: 10/10/2023         Home dose due: 10/11/2023    1) Infection: Infected pancreatic collection     2) Discharge Antibiotics:     Intravenous antibiotics:  Cefepime 2 g IV q12 hours        3) Therapy Duration:  four weeks     Estimated end date of IV antibiotics: 11/01/2023     4) Outpatient Weekly Labs:     Order the following labs to be drawn on Mondays:   CBC  CMP   CRP     5) Fax Lab Results to Infectious Diseases Provider: [facility] Fax Number: [phone]     6) Outpatient Infectious Diseases Follow-up     Follow-up appointment will be arranged by the ID clinic and will  be found in the patient's appointments tab.     Prior to discharge, please ensure the patient's follow-up has been scheduled.    If there is still no follow-up scheduled prior to discharge, please send an EPIC message to Bess Mullins in Infectious Diseases.       Scrub the Hub: Prior to accessing the line, always perform a 30 second alcohol scrub  Each lumen of the central line is to be flushed at least daily with 10 mL Normal Saline and 3 mL Heparin flush (10 units/mL)  Skilled Nurse (SN) may draw blood from IV access  Blood Draw Procedure:   - Aspirate at least 5 mL of blood   - Discard   - Obtain specimen   - Change injection cap   - Flush with 20 mL Normal Saline followed by a                 3-5 mL Heparin flush (10 units/mL)  Central :   - Sterile dressing changes are done weekly and as needed.   - Use chlor-hexadine scrub to cleanse site, apply Biopatch to insertion site,       apply securement device dressing   - Injection caps are changed weekly and after EVERY lab draw.   - If sterile gauze is under dressing to control oozing,                 dressing change must be performed every 24 hours until gauze is not needed.    Home Health Aide:  Nursing Three times weekly    Wound Care Orders  no    I certify that this patient is confined to his home and needs intermittent skilled nursing care.          31-Mar-2025 07:25

## 2025-03-31 NOTE — PLAN OF CARE
Problem: Pain Chronic (Persistent)  Goal: Optimal Pain Control and Function  Outcome: Progressing  Intervention: Develop Pain Management Plan  Flowsheets (Taken 3/31/2025 1814)  Pain Management Interventions:   around-the-clock dosing utilized   position adjusted   pain management plan reviewed with patient/caregiver  Intervention: Manage Persistent Pain  Flowsheets (Taken 3/31/2025 1814)  Bowel Elimination Promotion:   adequate fluid intake promoted   ambulation promoted  Medication Review/Management: medications reviewed     Problem: Pancreatitis  Goal: Optimal Pain Control and Function  Outcome: Progressing  Intervention: Monitor and Manage Pain  Flowsheets (Taken 3/31/2025 1814)  Pain Management Interventions:   around-the-clock dosing utilized   position adjusted   pain management plan reviewed with patient/caregiver     Problem: Pancreatitis  Goal: Fluid and Electrolyte Balance  Outcome: Not Progressing     Problem: Adult Inpatient Plan of Care  Goal: Plan of Care Review  Flowsheets (Taken 3/31/2025 1814)  Plan of Care Reviewed With: patient

## 2025-03-31 NOTE — ASSESSMENT & PLAN NOTE
Patient's most recent potassium results are listed below. Likely secondary to emesis.   Recent Labs     03/29/25  1427 03/30/25  1257 03/31/25  0552   K 3.9 3.3* 3.7     Plan  - Replete potassium per protocol  - Monitor potassium Daily  - Patient's hypokalemia is improving

## 2025-03-31 NOTE — SUBJECTIVE & OBJECTIVE
Interval History: No acute events overnight. UOP steady. Abdominal pain slightly improved. Worse with eating. RUQUS similar to previous.     Review of Systems  Objective:     Vital Signs (Most Recent):  Temp: 97.3 °F (36.3 °C) (03/31/25 0742)  Pulse: 63 (03/31/25 0742)  Resp: 16 (03/31/25 0859)  BP: (!) 145/83 (03/31/25 0742)  SpO2: (!) 94 % (03/31/25 0742) Vital Signs (24h Range):  Temp:  [97.3 °F (36.3 °C)-98.9 °F (37.2 °C)] 97.3 °F (36.3 °C)  Pulse:  [63-97] 63  Resp:  [16-20] 16  SpO2:  [94 %-100 %] 94 %  BP: (129-145)/(74-87) 145/83     Weight: 70.3 kg (155 lb)  Body mass index is 22.24 kg/m².  No intake or output data in the 24 hours ending 03/31/25 1035      Physical Exam  Vitals and nursing note reviewed.   Constitutional:       General: He is not in acute distress.     Appearance: Normal appearance. He is not ill-appearing.   Eyes:      General: No scleral icterus.  Cardiovascular:      Rate and Rhythm: Normal rate and regular rhythm.      Heart sounds: Normal heart sounds. No murmur heard.  Pulmonary:      Effort: No respiratory distress.      Breath sounds: Normal breath sounds. No wheezing or rhonchi.   Abdominal:      General: Abdomen is flat. Bowel sounds are normal. There is no distension.      Palpations: Abdomen is soft.      Tenderness: There is generalized abdominal tenderness and tenderness in the epigastric area, periumbilical area and left upper quadrant. There is no right CVA tenderness or left CVA tenderness.   Skin:     General: Skin is warm and dry.   Neurological:      General: No focal deficit present.      Mental Status: He is alert and oriented to person, place, and time.   Psychiatric:         Mood and Affect: Mood normal.         Behavior: Behavior normal.               Significant Labs: All pertinent labs within the past 24 hours have been reviewed.  CBC:   Recent Labs   Lab 03/29/25  1322 03/30/25  1257 03/30/25  1301 03/31/25  0552   WBC 6.19 4.79  --  4.02   HGB 12.6* 11.3*  --   10.2*   HCT 43.2 37.0* 39 33.9*    157  --  150     CMP:   Recent Labs   Lab 03/29/25  1427 03/30/25  1257 03/31/25  0552   * 135* 134*   K 3.9 3.3* 3.7    101 101   CO2 23 21* 27   BUN 8 4* 8   CREATININE 0.7 0.7 0.6   CALCIUM 7.9* 8.6* 8.3*   ALBUMIN 3.3* 3.6 3.0*   BILITOT 0.5 0.7 0.4   ALKPHOS 101 115 97   * 85* 62*   * 104* 77*   ANIONGAP 8 13 6*       Significant Imaging: I have reviewed all pertinent imaging results/findings within the past 24 hours.  US Abdomen Limited  Narrative: EXAMINATION:  US ABDOMEN LIMITED    CLINICAL HISTORY:  RUQ pain;.    TECHNIQUE:  Limited ultrasound of the right upper quadrant of the abdomen including pancreas, liver, gallbladder, common bile duct was performed.    COMPARISON:  CT abdomen pelvis from 03/29/2025    FINDINGS:  Liver: Enlarged in size measuring 19.9 cm. Diffuse increased echogenicity consistent with hepatic steatosis.  No focal hepatic lesions.    Gallbladder: Gallbladder is partially distended.  No calculi, wall thickening, or pericholecystic fluid.  No sonographic Bland's sign.    Biliary system: The common duct is not dilated, measuring 4 mm.  No intrahepatic ductal dilatation.    Spleen: Enlarged in size measuring 16.4 x 4.2 cm.    Pancreas: The visualized portions of pancreas appear normal.    Miscellaneous: No ascites.  Impression: Hepatosplenomegaly and hepatic steatosis.    Electronically signed by: Travis Centeno MD  Date:    03/31/2025  Time:    07:54

## 2025-04-01 VITALS
RESPIRATION RATE: 16 BRPM | HEIGHT: 70 IN | SYSTOLIC BLOOD PRESSURE: 128 MMHG | TEMPERATURE: 98 F | BODY MASS INDEX: 22.19 KG/M2 | HEART RATE: 83 BPM | OXYGEN SATURATION: 94 % | WEIGHT: 155 LBS | DIASTOLIC BLOOD PRESSURE: 85 MMHG

## 2025-04-01 LAB
ABSOLUTE EOSINOPHIL (OHS): 0.14 K/UL
ABSOLUTE MONOCYTE (OHS): 0.51 K/UL (ref 0.3–1)
ABSOLUTE NEUTROPHIL COUNT (OHS): 1.55 K/UL (ref 1.8–7.7)
ALBUMIN SERPL BCP-MCNC: 3 G/DL (ref 3.5–5.2)
ALP SERPL-CCNC: 103 UNIT/L (ref 40–150)
ALT SERPL W/O P-5'-P-CCNC: 68 UNIT/L (ref 10–44)
ANION GAP (OHS): 9 MMOL/L (ref 8–16)
AST SERPL-CCNC: 61 UNIT/L (ref 11–45)
BASOPHILS # BLD AUTO: 0.02 K/UL
BASOPHILS NFR BLD AUTO: 0.5 %
BILIRUB SERPL-MCNC: 0.3 MG/DL (ref 0.1–1)
BUN SERPL-MCNC: 9 MG/DL (ref 6–20)
CALCIUM SERPL-MCNC: 8.4 MG/DL (ref 8.7–10.5)
CHLORIDE SERPL-SCNC: 104 MMOL/L (ref 95–110)
CO2 SERPL-SCNC: 22 MMOL/L (ref 23–29)
CREAT SERPL-MCNC: 0.7 MG/DL (ref 0.5–1.4)
ERYTHROCYTE [DISTWIDTH] IN BLOOD BY AUTOMATED COUNT: 17.7 % (ref 11.5–14.5)
GFR SERPLBLD CREATININE-BSD FMLA CKD-EPI: >60 ML/MIN/1.73/M2
GLUCOSE SERPL-MCNC: 120 MG/DL (ref 70–110)
HCT VFR BLD AUTO: 34.5 % (ref 40–54)
HGB BLD-MCNC: 10.2 GM/DL (ref 14–18)
IMM GRANULOCYTES # BLD AUTO: 0.01 K/UL (ref 0–0.04)
IMM GRANULOCYTES NFR BLD AUTO: 0.3 % (ref 0–0.5)
LYMPHOCYTES # BLD AUTO: 1.51 K/UL (ref 1–4.8)
MAGNESIUM SERPL-MCNC: 1.9 MG/DL (ref 1.6–2.6)
MCH RBC QN AUTO: 24.2 PG (ref 27–31)
MCHC RBC AUTO-ENTMCNC: 29.6 G/DL (ref 32–36)
MCV RBC AUTO: 82 FL (ref 82–98)
NUCLEATED RBC (/100WBC) (OHS): 0 /100 WBC
PHOSPHATE SERPL-MCNC: 4.7 MG/DL (ref 2.7–4.5)
PLATELET # BLD AUTO: 149 K/UL (ref 150–450)
PMV BLD AUTO: 11.2 FL (ref 9.2–12.9)
POTASSIUM SERPL-SCNC: 4.2 MMOL/L (ref 3.5–5.1)
PROT SERPL-MCNC: 6.4 GM/DL (ref 6–8.4)
RBC # BLD AUTO: 4.22 M/UL (ref 4.6–6.2)
RELATIVE EOSINOPHIL (OHS): 3.7 %
RELATIVE LYMPHOCYTE (OHS): 40.4 % (ref 18–48)
RELATIVE MONOCYTE (OHS): 13.6 % (ref 4–15)
RELATIVE NEUTROPHIL (OHS): 41.5 % (ref 38–73)
SODIUM SERPL-SCNC: 135 MMOL/L (ref 136–145)
WBC # BLD AUTO: 3.74 K/UL (ref 3.9–12.7)

## 2025-04-01 PROCEDURE — 96376 TX/PRO/DX INJ SAME DRUG ADON: CPT

## 2025-04-01 PROCEDURE — 25000003 PHARM REV CODE 250: Performed by: STUDENT IN AN ORGANIZED HEALTH CARE EDUCATION/TRAINING PROGRAM

## 2025-04-01 PROCEDURE — 83735 ASSAY OF MAGNESIUM: CPT | Performed by: STUDENT IN AN ORGANIZED HEALTH CARE EDUCATION/TRAINING PROGRAM

## 2025-04-01 PROCEDURE — 85025 COMPLETE CBC W/AUTO DIFF WBC: CPT | Performed by: STUDENT IN AN ORGANIZED HEALTH CARE EDUCATION/TRAINING PROGRAM

## 2025-04-01 PROCEDURE — 36415 COLL VENOUS BLD VENIPUNCTURE: CPT | Performed by: STUDENT IN AN ORGANIZED HEALTH CARE EDUCATION/TRAINING PROGRAM

## 2025-04-01 PROCEDURE — 84100 ASSAY OF PHOSPHORUS: CPT | Performed by: STUDENT IN AN ORGANIZED HEALTH CARE EDUCATION/TRAINING PROGRAM

## 2025-04-01 PROCEDURE — 63600175 PHARM REV CODE 636 W HCPCS: Performed by: STUDENT IN AN ORGANIZED HEALTH CARE EDUCATION/TRAINING PROGRAM

## 2025-04-01 PROCEDURE — G0378 HOSPITAL OBSERVATION PER HR: HCPCS

## 2025-04-01 PROCEDURE — 84155 ASSAY OF PROTEIN SERUM: CPT | Performed by: STUDENT IN AN ORGANIZED HEALTH CARE EDUCATION/TRAINING PROGRAM

## 2025-04-01 RX ORDER — LACTULOSE 10 G/15ML
10 SOLUTION ORAL; RECTAL 2 TIMES DAILY PRN
Qty: 90 ML | Refills: 0 | Status: ON HOLD | OUTPATIENT
Start: 2025-04-01 | End: 2025-04-04

## 2025-04-01 RX ORDER — AMOXICILLIN 250 MG
1 CAPSULE ORAL 2 TIMES DAILY
Qty: 60 TABLET | Refills: 0 | Status: ON HOLD | OUTPATIENT
Start: 2025-04-01 | End: 2025-05-01

## 2025-04-01 RX ORDER — OXYCODONE HYDROCHLORIDE 5 MG/1
5 TABLET ORAL EVERY 4 HOURS PRN
Qty: 18 TABLET | Refills: 0 | Status: ON HOLD | OUTPATIENT
Start: 2025-04-01 | End: 2025-04-04

## 2025-04-01 RX ADMIN — BICTEGRAVIR SODIUM, EMTRICITABINE, AND TENOFOVIR ALAFENAMIDE FUMARATE 1 TABLET: 50; 200; 25 TABLET ORAL at 08:04

## 2025-04-01 RX ADMIN — HYDROMORPHONE HYDROCHLORIDE 2 MG: 2 INJECTION, SOLUTION INTRAMUSCULAR; INTRAVENOUS; SUBCUTANEOUS at 02:04

## 2025-04-01 RX ADMIN — HYDROMORPHONE HYDROCHLORIDE 2 MG: 2 INJECTION, SOLUTION INTRAMUSCULAR; INTRAVENOUS; SUBCUTANEOUS at 06:04

## 2025-04-01 RX ADMIN — HYDROMORPHONE HYDROCHLORIDE 2 MG: 2 INJECTION, SOLUTION INTRAMUSCULAR; INTRAVENOUS; SUBCUTANEOUS at 10:04

## 2025-04-01 RX ADMIN — LACTULOSE 15 G: 20 SOLUTION ORAL at 10:04

## 2025-04-01 RX ADMIN — SENNOSIDES AND DOCUSATE SODIUM 1 TABLET: 50; 8.6 TABLET ORAL at 08:04

## 2025-04-01 NOTE — PLAN OF CARE
[facility]  Discharge Final Note    Primary Care Provider: Pina Jones NP    Expected Discharge Date: 4/1/2025    Final Discharge Note (most recent)       Final Note - 04/01/25 1529          Final Note    Assessment Type Final Discharge Note     Anticipated Discharge Disposition Home or Self Care     Hospital Resources/Appts/Education Provided Provided patient/caregiver with written discharge plan information;Provided education on problems/symptoms using teachback        Post-Acute Status    Discharge Delays None known at this time                     Future Appointments   Date Time Provider Department Center   4/3/2025  1:30 PM Rich Velasco MD [facility]     Pt discharged home with no services. Abbe Capone, RN,BSN        Important Message from Medicare             Contact Info       Pina Jones NP   Specialty: Family Medicine   Relationship: PCP - General    [address]  [facility] [address]   Phone: [phone]       Next Steps: Follow up in 1 week(s)

## 2025-04-01 NOTE — PLAN OF CARE
Problem: Adult Inpatient Plan of Care  Goal: Plan of Care Review  Outcome: Progressing     Problem: Pain Chronic (Persistent)  Goal: Optimal Pain Control and Function  Outcome: Progressing     Problem: Adult Inpatient Plan of Care  Goal: Optimal Comfort and Wellbeing  Outcome: Progressing     Problem: Pancreatitis  Goal: Fluid and Electrolyte Balance  Outcome: Progressing     Problem: Pancreatitis  Goal: Absence of Infection Signs and Symptoms  Outcome: Progressing

## 2025-04-01 NOTE — ASSESSMENT & PLAN NOTE
Patient's most recent potassium results are listed below. Likely secondary to emesis.   Recent Labs     03/30/25  1257 03/31/25  0552 04/01/25  0354   K 3.3* 3.7 4.2     Plan  - Replete potassium per protocol  - Monitor potassium Daily  - Patient's hypokalemia is improving

## 2025-04-01 NOTE — NURSING
Discharge instructions given and reviewed with patient.  Verbalized understanding.  Made aware of medication additions and changes.  Verbalized understanding.  PIV removed. Catheter tip intact.  Pressure applied.  No bleeding noted. Respirations even and unlabored upon discharge.  No complaints of pain and discomfort.  Awaiting transportation home.

## 2025-04-01 NOTE — ASSESSMENT & PLAN NOTE
New this admission. Improved from 3/28 but still elevated. Patient developed RUQ pain on 3/30. He does have history of positive hep C antibodies and imaging findings of hepatic steatosis on previous admits. RUQUS similar to previous.   - daily CMP

## 2025-04-01 NOTE — NURSING
Pt informed on need to collect urine when he voids again. Pt verbalized understanding. Urinal and urine cup at bedside. Pt will provide urine sample.

## 2025-04-01 NOTE — DISCHARGE SUMMARY
[facility] - Observation [address] [place] [address] Medicine  Discharge Summary      Patient Name: Tasia Cardona  MRN: [id number]  HEATHER: [id number]  Patient Class: OP- Observation  Admission Date: 3/30/2025  Hospital Length of Stay: 0 days  Discharge Date and Time: 4/1/2025  4:45 PM  Attending Physician: Marsha Navarrete MD   Discharging Provider: Marsha Navarrete MD  Primary Care Provider: Pina Jones NP  Hospital Medicine Team: [facility] Marsha Navarrete MD  Primary Care Team: [facility]    HPI:   Tasia Cardona is a 24 y.o. male w/ PMHx of HIV on HAART, chronic pancreatitis with necrotizing component, splenic vein thrombosis (not on AC), polycythemia vera, and asthma presenting with 4 days of worsening abdominal pain. He has presented to the ED for uncontrolled pain the past 3 days, associated with nausea and vomiting. Pain is worsen when reclining and with movement. Patient reports that pain has progressed from LUQ to epigastric and and towards the RUQ. This is similar to his previous episodes of pancreatitis. He denies fevers, chills, chest pain, or difficulty breathing. He has had some diarrhea recently but none on day of admission.     ED: given IVF and dilaudid. Lipase increasing past 3 days. LFTs highest 3/28 - down trending but elevated. CTAP 3/29 with increasing fluid collection concerning for developing pseudocyst.     * No surgery found *      Hospital Course:   Patient admitted for acute on chronic pancreatitis. Did well with supportive care - IVF and pain medication. Mild elevation in LFTs, improved. RUQUS similar to previous. Patient stable for dc on 4/1.     Patient seen and examined on day of discharge and deemed appropriate for discharge. Plan discussed with patient, who was agreeable and amenable. Medications were discussed and reviewed, outpatient follow-up scheduled, ER precautions were given, all questions were answered to the patient's satisfaction, and patient was subsequently  discharged.      Goals of Care Treatment Preferences:  Code Status: Full Code      SDOH Screening:  The patient was screened for utility difficulties, food insecurity, transport difficulties, housing insecurity, and interpersonal safety and there were no concerns identified this admission.     Consults:     Assessment & Plan  Acute on chronic pancreatitis  Nausea and vomiting  Fluid collection of pancreas  Patient presenting with characteristic abdominal pain for pancreatitis with elevated lipase and history of both chronic and necrotizing pancreatitis. Lipid panel has been grossly negative in the past. Has remote history of EtOH per documentation. CTAP 3/29 concerning for developing pseudocyst.  - supportive care:   cIVF   Dilaudid IV 2mg q4h prn until able to tolerate PO   Patient allergic to toradol   No scheduled tylenol due to elevation in LFTs   Antiemetics   - lipid panel wnl    LFT elevation  New this admission. Improved from 3/28 but still elevated. Patient developed RUQ pain on 3/30. He does have history of positive hep C antibodies and imaging findings of hepatic steatosis on previous admits. RUQUS similar to previous.   - daily CMP    HIV infection  - continue HAART    Polycythemia vera  History noted.     Recent Labs     03/30/25  1257 03/30/25  1301 03/31/25  0552 04/01/25  0354   HGB 11.3*  --  10.2* 10.2*   HCT 37.0* 39 33.9* 34.5*     Plan  - Monitor serial CBC: Daily  - Transfuse PRBC if patient becomes hemodynamically unstable, symptomatic or H/H drops below 7/21.  Mild intermittent asthma without complication  No respiratory distress at this time.     Hypokalemia  Patient's most recent potassium results are listed below. Likely secondary to emesis.   Recent Labs     03/30/25  1257 03/31/25  0552 04/01/25  0354   K 3.3* 3.7 4.2     Plan  - Replete potassium per protocol  - Monitor potassium Daily  - Patient's hypokalemia is improving  Final Active Diagnoses:    Diagnosis Date Noted POA     PRINCIPAL PROBLEM:  Acute on chronic pancreatitis [K85.90, K86.1] 12/15/2023 Yes    LFT elevation [R79.89] 03/30/2025 Yes    Hypokalemia [E87.6] 10/22/2023 Yes    Fluid collection of pancreas [K86.89] 09/30/2023 Yes    Mild intermittent asthma without complication [J45.20] 09/18/2023 Yes     Chronic    Nausea and vomiting [R11.2] 12/17/2022 Yes    Polycythemia vera [D45] 11/28/2021 Yes    HIV infection [Z21] 11/02/2021 Yes      Problems Resolved During this Admission:       Discharged Condition: stable    Disposition:     Follow Up:    Patient Instructions:   No discharge procedures on file.    Significant Diagnostic Studies: N/A    Pending Diagnostic Studies:       Procedure Component Value Units Date/Time    Ethanol, urine [[id number]]     Order Status: Sent Lab Status: No result     Specimen: Urine     Toxicology screen, urine [[id number]]     Order Status: Sent Lab Status: No result     Specimen: Urine            Medications:  Reconciled Home Medications:      Medication List        START taking these medications      CONSTULOSE 10 gram/15 mL solution  Generic drug: lactulose  Take 15 mLs (10 g total) by mouth 2 (two) times daily as needed (constipation).     STOOL SOFTENER-LAXATIVE 8.6-50 mg per tablet  Generic drug: senna-docusate  Take 1 tablet by mouth 2 (two) times daily.            CHANGE how you take these medications      promethazine 25 MG tablet  Commonly known as: PHENERGAN  Take 1 tablet (25 mg total) by mouth every 6 (six) hours as needed for Nausea.  What changed: Another medication with the same name was removed. Continue taking this medication, and follow the directions you see here.            CONTINUE taking these medications      BIKTARVY -25 mg (25 kg or greater)  Generic drug: rhmdkvsnv-eoajnhms-pzriill ala  Take 1 tablet by mouth once daily.     ondansetron 4 MG Tbdl  Commonly known as: ZOFRAN-ODT  Take 1 tablet (4 mg total) by mouth every 6 (six) hours as needed (Nausea).      oxyCODONE 5 MG immediate release tablet  Commonly known as: ROXICODONE  Take 1 tablet (5 mg total) by mouth every 4 (four) hours as needed for Pain.     pantoprazole 40 MG tablet  Commonly known as: PROTONIX  Take 1 tablet (40 mg total) by mouth once daily.              Indwelling Lines/Drains at time of discharge:   Lines/Drains/Airways       None                   Time spent on the discharge of patient: 35 minutes         Marsha Navarrete MD  Department of Hospital Medicine  [facility] - Observation 11H

## 2025-04-01 NOTE — ASSESSMENT & PLAN NOTE
History noted.     Recent Labs     03/30/25  1257 03/30/25  1301 03/31/25  0552 04/01/25  0354   HGB 11.3*  --  10.2* 10.2*   HCT 37.0* 39 33.9* 34.5*     Plan  - Monitor serial CBC: Daily  - Transfuse PRBC if patient becomes hemodynamically unstable, symptomatic or H/H drops below 7/21.

## 2025-04-02 ENCOUNTER — PATIENT MESSAGE (OUTPATIENT)
Dept: INFECTIOUS DISEASES | Facility: CLINIC | Age: 25
End: 2025-04-02
Payer: MEDICAID

## 2025-04-03 ENCOUNTER — OFFICE VISIT (OUTPATIENT)
Dept: INFECTIOUS DISEASES | Facility: CLINIC | Age: 25
End: 2025-04-03
Payer: MEDICAID

## 2025-04-03 VITALS
WEIGHT: 172.38 LBS | SYSTOLIC BLOOD PRESSURE: 119 MMHG | DIASTOLIC BLOOD PRESSURE: 80 MMHG | HEIGHT: 70 IN | HEART RATE: 109 BPM | TEMPERATURE: 99 F | BODY MASS INDEX: 24.68 KG/M2

## 2025-04-03 DIAGNOSIS — K85.00 IDIOPATHIC ACUTE PANCREATITIS WITHOUT INFECTION OR NECROSIS: ICD-10-CM

## 2025-04-03 DIAGNOSIS — Z21 ASYMPTOMATIC HIV INFECTION, WITH NO HISTORY OF HIV-RELATED ILLNESS: Primary | ICD-10-CM

## 2025-04-03 PROCEDURE — 99999 PR PBB SHADOW E&M-EST. PATIENT-LVL III: CPT | Mod: PBBFAC,,, | Performed by: INTERNAL MEDICINE

## 2025-04-03 PROCEDURE — 1160F RVW MEDS BY RX/DR IN RCRD: CPT | Mod: CPTII,,, | Performed by: INTERNAL MEDICINE

## 2025-04-03 PROCEDURE — 3074F SYST BP LT 130 MM HG: CPT | Mod: CPTII,,, | Performed by: INTERNAL MEDICINE

## 2025-04-03 PROCEDURE — 3008F BODY MASS INDEX DOCD: CPT | Mod: CPTII,,, | Performed by: INTERNAL MEDICINE

## 2025-04-03 PROCEDURE — 99214 OFFICE O/P EST MOD 30 MIN: CPT | Mod: S$PBB,,, | Performed by: INTERNAL MEDICINE

## 2025-04-03 PROCEDURE — 3079F DIAST BP 80-89 MM HG: CPT | Mod: CPTII,,, | Performed by: INTERNAL MEDICINE

## 2025-04-03 PROCEDURE — 1159F MED LIST DOCD IN RCRD: CPT | Mod: CPTII,,, | Performed by: INTERNAL MEDICINE

## 2025-04-03 PROCEDURE — 99213 OFFICE O/P EST LOW 20 MIN: CPT | Mod: PBBFAC | Performed by: INTERNAL MEDICINE

## 2025-04-03 NOTE — PROGRESS NOTES
Subjective     Patient ID: Tasia Cardona is a 24 y.o. male.    Chief Complaint:Follow-up      History of Present Illness    24 year old male who is living with HIV (on biktarvy).  He is here for follow up.  He was recently admitted to the hospital for complications related to pancreatitis.    Interval History  - recent hospital stay for pancreatitis.  Admitted with abdominal pain, nausea/vomiting.  No fevers.  Some mild diarrhea.  Discharged on 4/1/25.  Hasn't eaten since due to concerns for pain.  - had moved to [place] from [place] to [place]  Moved back about 1-2 weeks ago.      Medical History  Chronic pancreatitis     Social History  Born and raised in [place].  Moved here for college.  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  Lives in [place].  Swims in a chlorinated pool as exercise.  No recent travel.  Patient has sex with men.  Smokes 2 cigarettes daily.  Owns 2 cats.  No birds.  When 15 years old went [place] [facility].     Review of Systems   All other systems reviewed and are negative.       Objective   Physical Exam  Vitals and nursing note reviewed.   Constitutional:       General: He is not in acute distress.     Appearance: He is well-developed. He is not diaphoretic.   HENT:      Head: Normocephalic and atraumatic.      Right Ear: External ear normal.      Left Ear: External ear normal.      Nose: Nose normal.      Mouth/Throat:      Pharynx: No oropharyngeal exudate.   Eyes:      General: No scleral icterus.        Right eye: No discharge.         Left eye: No discharge.      Conjunctiva/sclera: Conjunctivae normal.      Pupils: Pupils are equal, round, and reactive to light.   Neck:      Thyroid: No thyromegaly.      Vascular: No JVD.      Trachea: No tracheal deviation.   Cardiovascular:      Rate and Rhythm: Normal rate and regular rhythm.      Heart sounds: No murmur heard.     No friction rub. No gallop.   Pulmonary:      Effort: Pulmonary  effort is normal. No respiratory distress.      Breath sounds: Normal breath sounds. No stridor. No wheezing or rales.   Chest:      Chest wall: No tenderness.   Abdominal:      General: Bowel sounds are normal. There is no distension.      Palpations: Abdomen is soft. There is no mass.      Tenderness: There is abdominal tenderness. There is no guarding or rebound.   Musculoskeletal:         General: No tenderness. Normal range of motion.      Cervical back: Normal range of motion and neck supple.   Lymphadenopathy:      Cervical: No cervical adenopathy.   Skin:     General: Skin is warm.      Coloration: Skin is not pale.      Findings: No erythema or rash.   Neurological:      Mental Status: He is alert and oriented to person, place, and time.      Cranial Nerves: No cranial nerve deficit.      Motor: No abnormal muscle tone.      Coordination: Coordination normal.      Deep Tendon Reflexes: Reflexes are normal and symmetric. Reflexes normal.   Psychiatric:         Behavior: Behavior normal.         Thought Content: Thought content normal.         Judgment: Judgment normal.            Assessment and Plan     1. Asymptomatic HIV infection, with no history of HIV-related illness :  will check labs today.  Patient is to continue on biktarvy for now.   2. Idiopathic acute pancreatitis without infection or necrosis :  patient has significant abdominal pain.  He will go to the ER for admission.       Plan:  Asymptomatic HIV infection, with no history of HIV-related illness  -     CD4 T-Dilworth Cells; Future; Expected date: 04/03/2025  -     Comprehensive Metabolic Panel; Future; Expected date: 04/03/2025  -     HIV RNA, Quantitative, PCR; Future; Expected date: 04/03/2025  -     CBC Auto Differential; Future; Expected date: 04/03/2025  -     Treponema Pallidium Antibodies IgG, IgM; Future; Expected date: 04/03/2025  -     Urinalysis; Future; Expected date: 04/03/2025  -     C. trachomatis/N. gonorrhoeae by AMP DNA;  Future; Expected date: 04/03/2025    Idiopathic acute pancreatitis without infection or necrosis   - Patient will go to the ER for admission.

## 2025-04-04 ENCOUNTER — HOSPITAL ENCOUNTER (INPATIENT)
Facility: OTHER | Age: 25
LOS: 2 days | Discharge: HOME | DRG: 440 | End: 2025-04-07
Attending: EMERGENCY MEDICINE | Admitting: HOSPITALIST
Payer: MEDICAID

## 2025-04-04 DIAGNOSIS — K86.1 OTHER CHRONIC PANCREATITIS: ICD-10-CM

## 2025-04-04 DIAGNOSIS — K86.1 ACUTE ON CHRONIC PANCREATITIS: Primary | ICD-10-CM

## 2025-04-04 DIAGNOSIS — Z21 HIV INFECTION, UNSPECIFIED SYMPTOM STATUS: ICD-10-CM

## 2025-04-04 DIAGNOSIS — K85.90 ACUTE ON CHRONIC PANCREATITIS: Primary | ICD-10-CM

## 2025-04-04 DIAGNOSIS — R11.10 VOMITING: ICD-10-CM

## 2025-04-04 DIAGNOSIS — R07.9 CHEST PAIN: ICD-10-CM

## 2025-04-04 PROBLEM — R76.8 HEPATITIS C ANTIBODY DETECTED: Status: ACTIVE | Noted: 2025-04-04

## 2025-04-04 LAB
ABSOLUTE EOSINOPHIL (OHS): 0.16 K/UL
ABSOLUTE MONOCYTE (OHS): 0.7 K/UL (ref 0.3–1)
ABSOLUTE NEUTROPHIL COUNT (OHS): 1.94 K/UL (ref 1.8–7.7)
ALBUMIN SERPL BCP-MCNC: 4.1 G/DL (ref 3.5–5.2)
ALP SERPL-CCNC: 134 UNIT/L (ref 40–150)
ALT SERPL W/O P-5'-P-CCNC: 148 UNIT/L (ref 10–44)
AMPHET UR QL SCN: NEGATIVE
ANION GAP (OHS): 16 MMOL/L (ref 8–16)
AST SERPL-CCNC: 140 UNIT/L (ref 11–45)
BARBITURATE SCN PRESENT UR: NEGATIVE
BASOPHILS # BLD AUTO: 0.05 K/UL
BASOPHILS NFR BLD AUTO: 0.9 %
BENZODIAZ UR QL SCN: NEGATIVE
BILIRUB SERPL-MCNC: 0.3 MG/DL (ref 0.1–1)
BILIRUB UR QL STRIP.AUTO: NEGATIVE
BUN SERPL-MCNC: 5 MG/DL (ref 6–20)
CALCIUM SERPL-MCNC: 9.3 MG/DL (ref 8.7–10.5)
CANNABINOIDS UR QL SCN: NEGATIVE
CHLORIDE SERPL-SCNC: 106 MMOL/L (ref 95–110)
CLARITY UR: CLEAR
CO2 SERPL-SCNC: 21 MMOL/L (ref 23–29)
COCAINE UR QL SCN: NEGATIVE
COLOR UR AUTO: YELLOW
CREAT SERPL-MCNC: 0.7 MG/DL (ref 0.5–1.4)
CREAT UR-MCNC: 200.7 MG/DL (ref 23–375)
ERYTHROCYTE [DISTWIDTH] IN BLOOD BY AUTOMATED COUNT: 19.6 % (ref 11.5–14.5)
ETHANOL UR-MCNC: <10 MG/DL
GFR SERPLBLD CREATININE-BSD FMLA CKD-EPI: >60 ML/MIN/1.73/M2
GLUCOSE SERPL-MCNC: 141 MG/DL (ref 70–110)
GLUCOSE UR QL STRIP: ABNORMAL
HCT VFR BLD AUTO: 43 % (ref 40–54)
HCV AB SERPL QL IA: REACTIVE
HCV RNA SERPL NAA+PROBE-LOG IU: NOT DETECTED {LOG_IU}/ML
HGB BLD-MCNC: 12.8 GM/DL (ref 14–18)
HGB UR QL STRIP: NEGATIVE
IMM GRANULOCYTES # BLD AUTO: 0.02 K/UL (ref 0–0.04)
IMM GRANULOCYTES NFR BLD AUTO: 0.4 % (ref 0–0.5)
KETONES UR QL STRIP: NEGATIVE
LEUKOCYTE ESTERASE UR QL STRIP: NEGATIVE
LIPASE SERPL-CCNC: 30 U/L (ref 4–60)
LYMPHOCYTES # BLD AUTO: 2.68 K/UL (ref 1–4.8)
MCH RBC QN AUTO: 24.2 PG (ref 27–31)
MCHC RBC AUTO-ENTMCNC: 29.8 G/DL (ref 32–36)
MCV RBC AUTO: 81 FL (ref 82–98)
METHADONE UR QL SCN: NEGATIVE
NITRITE UR QL STRIP: NEGATIVE
NUCLEATED RBC (/100WBC) (OHS): 0 /100 WBC
OPIATES UR QL SCN: ABNORMAL
PCP UR QL: NEGATIVE
PH UR STRIP: 7 [PH]
PLATELET # BLD AUTO: 263 K/UL (ref 150–450)
PLATELET BLD QL SMEAR: NORMAL
PMV BLD AUTO: 10.4 FL (ref 9.2–12.9)
POTASSIUM SERPL-SCNC: 4 MMOL/L (ref 3.5–5.1)
PROT SERPL-MCNC: 8.9 GM/DL (ref 6–8.4)
PROT UR QL STRIP: ABNORMAL
RBC # BLD AUTO: 5.3 M/UL (ref 4.6–6.2)
RELATIVE EOSINOPHIL (OHS): 2.9 %
RELATIVE LYMPHOCYTE (OHS): 48.3 % (ref 18–48)
RELATIVE MONOCYTE (OHS): 12.6 % (ref 4–15)
RELATIVE NEUTROPHIL (OHS): 34.9 % (ref 38–73)
SODIUM SERPL-SCNC: 143 MMOL/L (ref 136–145)
SP GR UR STRIP: 1.03
UROBILINOGEN UR STRIP-ACNC: NEGATIVE EU/DL
WBC # BLD AUTO: 5.55 K/UL (ref 3.9–12.7)

## 2025-04-04 PROCEDURE — 63600175 PHARM REV CODE 636 W HCPCS

## 2025-04-04 PROCEDURE — 96374 THER/PROPH/DIAG INJ IV PUSH: CPT

## 2025-04-04 PROCEDURE — G0378 HOSPITAL OBSERVATION PER HR: HCPCS

## 2025-04-04 PROCEDURE — 25000003 PHARM REV CODE 250

## 2025-04-04 PROCEDURE — 81003 URINALYSIS AUTO W/O SCOPE: CPT

## 2025-04-04 PROCEDURE — 96376 TX/PRO/DX INJ SAME DRUG ADON: CPT

## 2025-04-04 PROCEDURE — 85025 COMPLETE CBC W/AUTO DIFF WBC: CPT | Performed by: NURSE PRACTITIONER

## 2025-04-04 PROCEDURE — 83690 ASSAY OF LIPASE: CPT | Performed by: NURSE PRACTITIONER

## 2025-04-04 PROCEDURE — 87522 HEPATITIS C REVRS TRNSCRPJ: CPT | Performed by: PHYSICIAN ASSISTANT

## 2025-04-04 PROCEDURE — 96361 HYDRATE IV INFUSION ADD-ON: CPT

## 2025-04-04 PROCEDURE — 80307 DRUG TEST PRSMV CHEM ANLYZR: CPT

## 2025-04-04 PROCEDURE — 86803 HEPATITIS C AB TEST: CPT | Performed by: PHYSICIAN ASSISTANT

## 2025-04-04 PROCEDURE — 80053 COMPREHEN METABOLIC PANEL: CPT | Performed by: NURSE PRACTITIONER

## 2025-04-04 PROCEDURE — 94761 N-INVAS EAR/PLS OXIMETRY MLT: CPT

## 2025-04-04 PROCEDURE — 63600175 PHARM REV CODE 636 W HCPCS: Performed by: NURSE PRACTITIONER

## 2025-04-04 PROCEDURE — 96375 TX/PRO/DX INJ NEW DRUG ADDON: CPT

## 2025-04-04 PROCEDURE — 99285 EMERGENCY DEPT VISIT HI MDM: CPT | Mod: 25

## 2025-04-04 RX ORDER — HYDROMORPHONE HYDROCHLORIDE 2 MG/ML
1 INJECTION, SOLUTION INTRAMUSCULAR; INTRAVENOUS; SUBCUTANEOUS EVERY 4 HOURS PRN
Status: DISCONTINUED | OUTPATIENT
Start: 2025-04-04 | End: 2025-04-04

## 2025-04-04 RX ORDER — SODIUM CHLORIDE 0.9 % (FLUSH) 0.9 %
10 SYRINGE (ML) INJECTION EVERY 12 HOURS PRN
Status: DISCONTINUED | OUTPATIENT
Start: 2025-04-04 | End: 2025-04-07 | Stop reason: HOSPADM

## 2025-04-04 RX ORDER — SODIUM CHLORIDE, SODIUM LACTATE, POTASSIUM CHLORIDE, CALCIUM CHLORIDE 600; 310; 30; 20 MG/100ML; MG/100ML; MG/100ML; MG/100ML
INJECTION, SOLUTION INTRAVENOUS CONTINUOUS
Status: ACTIVE | OUTPATIENT
Start: 2025-04-04 | End: 2025-04-05

## 2025-04-04 RX ORDER — HYDROMORPHONE HYDROCHLORIDE 2 MG/ML
2 INJECTION, SOLUTION INTRAMUSCULAR; INTRAVENOUS; SUBCUTANEOUS EVERY 4 HOURS PRN
Status: DISCONTINUED | OUTPATIENT
Start: 2025-04-04 | End: 2025-04-07 | Stop reason: HOSPADM

## 2025-04-04 RX ORDER — TALC
6 POWDER (GRAM) TOPICAL NIGHTLY PRN
Status: DISCONTINUED | OUTPATIENT
Start: 2025-04-04 | End: 2025-04-07 | Stop reason: HOSPADM

## 2025-04-04 RX ORDER — SIMETHICONE 80 MG
1 TABLET,CHEWABLE ORAL 4 TIMES DAILY PRN
Status: DISCONTINUED | OUTPATIENT
Start: 2025-04-04 | End: 2025-04-07 | Stop reason: HOSPADM

## 2025-04-04 RX ORDER — ONDANSETRON HYDROCHLORIDE 2 MG/ML
4 INJECTION, SOLUTION INTRAVENOUS
Status: COMPLETED | OUTPATIENT
Start: 2025-04-04 | End: 2025-04-04

## 2025-04-04 RX ORDER — PROCHLORPERAZINE EDISYLATE 5 MG/ML
5 INJECTION INTRAMUSCULAR; INTRAVENOUS EVERY 6 HOURS PRN
Status: DISCONTINUED | OUTPATIENT
Start: 2025-04-04 | End: 2025-04-04

## 2025-04-04 RX ORDER — GLUCAGON 1 MG
1 KIT INJECTION
Status: DISCONTINUED | OUTPATIENT
Start: 2025-04-04 | End: 2025-04-07 | Stop reason: HOSPADM

## 2025-04-04 RX ORDER — NALOXONE HCL 0.4 MG/ML
0.02 VIAL (ML) INJECTION
Status: DISCONTINUED | OUTPATIENT
Start: 2025-04-04 | End: 2025-04-07 | Stop reason: HOSPADM

## 2025-04-04 RX ORDER — HYDROMORPHONE HYDROCHLORIDE 2 MG/ML
1 INJECTION, SOLUTION INTRAMUSCULAR; INTRAVENOUS; SUBCUTANEOUS
Refills: 0 | Status: COMPLETED | OUTPATIENT
Start: 2025-04-04 | End: 2025-04-04

## 2025-04-04 RX ORDER — IBUPROFEN 200 MG
24 TABLET ORAL
Status: DISCONTINUED | OUTPATIENT
Start: 2025-04-04 | End: 2025-04-07 | Stop reason: HOSPADM

## 2025-04-04 RX ORDER — ONDANSETRON HYDROCHLORIDE 2 MG/ML
8 INJECTION, SOLUTION INTRAVENOUS EVERY 6 HOURS PRN
Status: DISCONTINUED | OUTPATIENT
Start: 2025-04-04 | End: 2025-04-07 | Stop reason: HOSPADM

## 2025-04-04 RX ORDER — LIDOCAINE 50 MG/G
1 PATCH TOPICAL
Status: DISCONTINUED | OUTPATIENT
Start: 2025-04-04 | End: 2025-04-07 | Stop reason: HOSPADM

## 2025-04-04 RX ORDER — ALUMINUM HYDROXIDE, MAGNESIUM HYDROXIDE, AND SIMETHICONE 1200; 120; 1200 MG/30ML; MG/30ML; MG/30ML
30 SUSPENSION ORAL 4 TIMES DAILY PRN
Status: DISCONTINUED | OUTPATIENT
Start: 2025-04-04 | End: 2025-04-07 | Stop reason: HOSPADM

## 2025-04-04 RX ORDER — HYDROMORPHONE HYDROCHLORIDE 2 MG/ML
2 INJECTION, SOLUTION INTRAMUSCULAR; INTRAVENOUS; SUBCUTANEOUS EVERY 4 HOURS PRN
Refills: 0 | Status: DISCONTINUED | OUTPATIENT
Start: 2025-04-04 | End: 2025-04-04

## 2025-04-04 RX ORDER — ENOXAPARIN SODIUM 100 MG/ML
40 INJECTION SUBCUTANEOUS EVERY 24 HOURS
Status: DISCONTINUED | OUTPATIENT
Start: 2025-04-04 | End: 2025-04-07 | Stop reason: HOSPADM

## 2025-04-04 RX ORDER — IBUPROFEN 200 MG
16 TABLET ORAL
Status: DISCONTINUED | OUTPATIENT
Start: 2025-04-04 | End: 2025-04-07 | Stop reason: HOSPADM

## 2025-04-04 RX ORDER — AMOXICILLIN 250 MG
1 CAPSULE ORAL 2 TIMES DAILY
Status: DISCONTINUED | OUTPATIENT
Start: 2025-04-04 | End: 2025-04-07 | Stop reason: HOSPADM

## 2025-04-04 RX ORDER — DIPHENHYDRAMINE HCL 25 MG
25 CAPSULE ORAL EVERY 6 HOURS PRN
Status: DISCONTINUED | OUTPATIENT
Start: 2025-04-04 | End: 2025-04-07 | Stop reason: HOSPADM

## 2025-04-04 RX ORDER — ACETAMINOPHEN 500 MG
1000 TABLET ORAL EVERY 8 HOURS PRN
Status: DISCONTINUED | OUTPATIENT
Start: 2025-04-04 | End: 2025-04-07 | Stop reason: HOSPADM

## 2025-04-04 RX ORDER — ONDANSETRON HYDROCHLORIDE 2 MG/ML
4 INJECTION, SOLUTION INTRAVENOUS EVERY 6 HOURS PRN
Status: DISCONTINUED | OUTPATIENT
Start: 2025-04-04 | End: 2025-04-04

## 2025-04-04 RX ADMIN — MELATONIN TAB 3 MG 6 MG: 3 TAB at 08:04

## 2025-04-04 RX ADMIN — SENNOSIDES AND DOCUSATE SODIUM 1 TABLET: 50; 8.6 TABLET ORAL at 08:04

## 2025-04-04 RX ADMIN — HYDROMORPHONE HYDROCHLORIDE 2 MG: 2 INJECTION INTRAMUSCULAR; INTRAVENOUS; SUBCUTANEOUS at 05:04

## 2025-04-04 RX ADMIN — SODIUM CHLORIDE 1000 ML: 9 INJECTION, SOLUTION INTRAVENOUS at 01:04

## 2025-04-04 RX ADMIN — HYDROMORPHONE HYDROCHLORIDE 1 MG: 2 INJECTION, SOLUTION INTRAMUSCULAR; INTRAVENOUS; SUBCUTANEOUS at 01:04

## 2025-04-04 RX ADMIN — HYDROMORPHONE HYDROCHLORIDE 1 MG: 2 INJECTION, SOLUTION INTRAMUSCULAR; INTRAVENOUS; SUBCUTANEOUS at 04:04

## 2025-04-04 RX ADMIN — HYDROMORPHONE HYDROCHLORIDE 1 MG: 2 INJECTION, SOLUTION INTRAMUSCULAR; INTRAVENOUS; SUBCUTANEOUS at 03:04

## 2025-04-04 RX ADMIN — SODIUM CHLORIDE, POTASSIUM CHLORIDE, SODIUM LACTATE AND CALCIUM CHLORIDE: 600; 310; 30; 20 INJECTION, SOLUTION INTRAVENOUS at 04:04

## 2025-04-04 RX ADMIN — HYDROMORPHONE HYDROCHLORIDE 1 MG: 2 INJECTION, SOLUTION INTRAMUSCULAR; INTRAVENOUS; SUBCUTANEOUS at 12:04

## 2025-04-04 RX ADMIN — ONDANSETRON 8 MG: 2 INJECTION INTRAMUSCULAR; INTRAVENOUS at 09:04

## 2025-04-04 RX ADMIN — ONDANSETRON 4 MG: 2 INJECTION INTRAMUSCULAR; INTRAVENOUS at 01:04

## 2025-04-04 RX ADMIN — HYDROMORPHONE HYDROCHLORIDE 2 MG: 2 INJECTION INTRAMUSCULAR; INTRAVENOUS; SUBCUTANEOUS at 09:04

## 2025-04-04 RX ADMIN — BICTEGRAVIR SODIUM, EMTRICITABINE, AND TENOFOVIR ALAFENAMIDE FUMARATE 1 TABLET: 50; 200; 25 TABLET ORAL at 08:04

## 2025-04-04 RX ADMIN — SODIUM CHLORIDE, POTASSIUM CHLORIDE, SODIUM LACTATE AND CALCIUM CHLORIDE: 600; 310; 30; 20 INJECTION, SOLUTION INTRAVENOUS at 06:04

## 2025-04-04 RX ADMIN — HYDROMORPHONE HYDROCHLORIDE 1 MG: 2 INJECTION, SOLUTION INTRAMUSCULAR; INTRAVENOUS; SUBCUTANEOUS at 08:04

## 2025-04-04 NOTE — Clinical Note
Diagnosis: Other chronic pancreatitis [[id number]]  Future Attending Provider: RAGHAVENDRA PANG III [[id number]]

## 2025-04-04 NOTE — ASSESSMENT & PLAN NOTE
Diagnosed on screening test 10/2021.  History of previous gonorrhea 2021. Last CD4 1080 on 10/12/23. Last ID appointment 4/3 but no note posted.     -Continue HAART (Biktarvy)

## 2025-04-04 NOTE — ASSESSMENT & PLAN NOTE
24M with hx of HIV on HAART, chronic pancreatitis with necrotizing component, splenic vein thrombosis (not on AC), polycythemia vera, and asthma who presents to [facility] with complaints of worsening left-sided abdominal pain, nausea/vomiting, inability to tolerate PO over the past 3 days following recent hospital discharge on 4/1. ED admit for presumed acute on chronic pancreatitis given patient symptoms similar to previous episodes. Lipase 30 compared to 167 noted from prior 5 days ago.    -supportive management   -maintenance fluids, antiemetics PRN  -initiate early feeding as tolerated  -dilaudid IV 2mg q4h prn until able to tolerate PO   -transition to more feasible pain regimen when able

## 2025-04-04 NOTE — CARE UPDATE
Patient transferred from [facility] due to capacity and is admitted for acute on chronic pancreatitis. Patient has hx of chronic pain and states current pain management not managing his pain well.  Patient states he was able to tolerate small amount of breakfast this morning and is requesting to eat.     Physical Exam  Vitals reviewed.   Constitutional:       Appearance: Normal appearance. He is normal weight.      Comments: Appears uncomfortable   HENT:      Head: Normocephalic.      Mouth/Throat:      Mouth: Mucous membranes are moist.      Pharynx: Oropharynx is clear.   Eyes:      General: Lids are normal. Gaze aligned appropriately.      Conjunctiva/sclera: Conjunctivae normal.   Cardiovascular:      Rate and Rhythm: Normal rate and regular rhythm.      Pulses: Normal pulses.      Heart sounds: Normal heart sounds.   Pulmonary:      Effort: Pulmonary effort is normal.      Breath sounds: Normal breath sounds.   Abdominal:      General: Bowel sounds are normal.      Palpations: Abdomen is soft.      Tenderness: There is abdominal tenderness in the epigastric area and left upper quadrant.   Musculoskeletal:         General: Normal range of motion.      Cervical back: Normal range of motion.   Skin:     General: Skin is warm and dry.   Neurological:      Mental Status: He is alert and oriented to person, place, and time. Mental status is at baseline.   Psychiatric:         Mood and Affect: Mood normal.      Plan:  Continue with current plan and will increase dilaudid; reinstate diet

## 2025-04-04 NOTE — PHARMACY MED REC
"          Admission Medication History     The home medication history was taken by Rody Nice.    You may go to "Admission" then "Reconcile Home Medications" tabs to review and/or act upon these items.     The home medication list has been updated by the Pharmacy department.   Please read ALL comments highlighted in yellow.   Please address this information as you see fit.    Feel free to contact us if you have any questions or require assistance.      The medications listed below were removed from the home medication list. Please reorder if appropriate:  Patient reports no longer taking the following medication(s):  Chronulac   Zofran     Medications listed below were obtained from: Patient/family and Analytic software- [employer] [facility]ations   Medication Sig    bcygqisrj-vrklvnjc-ofehjqu ala (BIKTARVY) -25 mg (25 kg or greater) Take 1 tablet by mouth once daily.      oxyCODONE (ROXICODONE) 5 MG immediate release tablet Take 1 tablet (5 mg total) by mouth every 4 (four) hours as needed for Pain      promethazine (PHENERGAN) 25 MG tablet Take 1 tablet (25 mg total) by mouth every 6 (six) hours as needed for Nausea.      senna-docusate (PERICOLACE) 8.6-50 mg per tablet Take 1 tablet by mouth 2 (two) times daily.           Rody Nice  [id number]        .          "

## 2025-04-04 NOTE — SUBJECTIVE & OBJECTIVE
Past Medical History:   Diagnosis Date    Acute necrotizing pancreatitis 09/20/2023    Alcohol use disorder 10/05/2023    Asthma     Hepatitis C antibody positive in blood 09/18/2023 9/2023 PCR negative    HIV infection 10/2021    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       Past Surgical History:   Procedure Laterality Date    ENDOSCOPIC ULTRASOUND OF UPPER GASTROINTESTINAL TRACT N/A 10/23/2023    Procedure: ULTRASOUND, UPPER GI TRACT, ENDOSCOPIC;  Surgeon: Emil Villatoro MD;  Location: [facility] ([address] [place] [address]);  Service: Endoscopy;  Laterality: N/A;    ENDOSCOPIC ULTRASOUND OF UPPER GASTROINTESTINAL TRACT N/A 7/24/2024    Procedure: ULTRASOUND, UPPER GI TRACT, ENDOSCOPIC;  Surgeon: Faustino Trujillo MD;  Location: [facility] ([address] [place] [address]);  Service: Endoscopy;  Laterality: N/A;    ERCP N/A 10/23/2023    Procedure: ERCP (ENDOSCOPIC RETROGRADE CHOLANGIOPANCREATOGRAPHY);  Surgeon: Emil Villatoro MD;  [facility] ([facility]  Service: Endoscopy;  Laterality: N/A;    ESOPHAGOGASTRODUODENOSCOPY N/A 9/18/2023    Procedure: EGD (ESOPHAGOGASTRODUODENOSCOPY);  Surgeon: Kg Cleaning MD;  [facility]  Service: Endoscopy;  Laterality: N/A;    ESOPHAGOGASTRODUODENOSCOPY N/A 3/8/2024    Procedure: EGD (ESOPHAGOGASTRODUODENOSCOPY);  Surgeon: Greg Love MD;  Location: [facility] ([facility]  Service: Endoscopy;  Laterality: N/A;    ESOPHAGOGASTRODUODENOSCOPY N/A 7/24/2024    Procedure: EGD (ESOPHAGOGASTRODUODENOSCOPY);  Surgeon: Faustino Trujillo MD;  [facility]  Service: Endoscopy;  Laterality: N/A;       Review of patient's allergies indicates:   Allergen Reactions    [place] Swelling    Pecan nut Swelling    Penicillins Hives     Tolerates cephalosporins    Tolerated  piperacillin/tazobactam 11/2023    Sulfa (sulfonamide antibiotics) Hives    Opioids - morphine analogues Itching, Nausea And Vomiting and Rash    Tolonium chloride(toluidine blue-o) Hives, Itching and Rash    Toradol [ketorolac] Rash       No current facility-administered medications on file prior to encounter.     Current Outpatient Medications on File Prior to Encounter   Medication Sig    mxxnrgjre-zxfeonmm-ooobgjh ala (BIKTARVY) -25 mg (25 kg or greater) Take 1 tablet by mouth once daily.    lactulose (CHRONULAC) 10 gram/15 mL solution Take 15 mLs (10 g total) by mouth 2 (two) times daily as needed (constipation).    ondansetron (ZOFRAN-ODT) 4 MG TbDL Take 1 tablet (4 mg total) by mouth every 6 (six) hours as needed (Nausea).    oxyCODONE (ROXICODONE) 5 MG immediate release tablet Take 1 tablet (5 mg total) by mouth every 4 (four) hours as needed for Pain.    pantoprazole (PROTONIX) 40 MG tablet Take 1 tablet (40 mg total) by mouth once daily.    promethazine (PHENERGAN) 25 MG tablet Take 1 tablet (25 mg total) by mouth every 6 (six) hours as needed for Nausea.    senna-docusate (PERICOLACE) 8.6-50 mg per tablet Take 1 tablet by mouth 2 (two) times daily.     Family History       Problem Relation (Age of Onset)    Breast cancer Maternal Grandmother    Cancer Mother, Brother    No Known Problems Father    Ovarian cancer Mother    Pancreatitis Mother          Tobacco Use    Smoking status: Former     Types: Cigarettes    Smokeless tobacco: Never   Substance and Sexual Activity    Alcohol use: Not Currently    Drug use: Yes     Types: Marijuana    Sexual activity: Not on file     Review of Systems   Constitutional:  Positive for appetite change. Negative for chills, fatigue and fever.   HENT:  Negative for congestion, sore throat and trouble swallowing.    Eyes:  Negative for visual disturbance.   Respiratory:  Negative for cough, chest tightness and shortness of breath.    Cardiovascular:  Negative for chest  pain, palpitations and leg swelling.   Gastrointestinal:  Positive for abdominal pain, nausea and vomiting. Negative for abdominal distention, blood in stool, constipation and diarrhea.   Genitourinary:  Negative for dysuria.   Musculoskeletal:  Negative for arthralgias and myalgias.   Skin:  Negative for rash.   Neurological:  Negative for dizziness and light-headedness.   Psychiatric/Behavioral:  Negative for confusion and decreased concentration.      Objective:     Vital Signs (Most Recent):  Temp: 98.1 °F (36.7 °C) (04/04/25 0600)  Pulse: 80 (04/04/25 0600)  Resp: 16 (04/04/25 0600)  BP: 128/75 (04/04/25 0600)  SpO2: 99 % (04/04/25 0600) Vital Signs (24h Range):  Temp:  [98.1 °F (36.7 °C)-98.8 °F (37.1 °C)] 98.1 °F (36.7 °C)  Pulse:  [] 80  Resp:  [16-18] 16  SpO2:  [97 %-99 %] 99 %  BP: (116-130)/(62-80) 128/75     Weight: 77.1 kg (170 lb)  Body mass index is 24.39 kg/m².     Physical Exam  Vitals and nursing note reviewed.   Constitutional:       General: He is not in acute distress.     Appearance: Normal appearance. He is not ill-appearing, toxic-appearing or diaphoretic.      Comments: Resting comfortably throughout length of exam, no acute distress   Eyes:      General: No scleral icterus.     Conjunctiva/sclera: Conjunctivae normal.   Cardiovascular:      Rate and Rhythm: Normal rate and regular rhythm.      Pulses: Normal pulses.      Heart sounds: Normal heart sounds. No murmur heard.  Pulmonary:      Effort: Pulmonary effort is normal. No respiratory distress.      Breath sounds: Normal breath sounds. No wheezing, rhonchi or rales.   Abdominal:      General: Abdomen is flat. Bowel sounds are normal. There is no distension.      Palpations: Abdomen is soft.      Tenderness: There is generalized abdominal tenderness and tenderness in the epigastric area, periumbilical area and left upper quadrant. There is no right CVA tenderness or left CVA tenderness.   Musculoskeletal:         General: No  swelling.      Right lower leg: No edema.      Left lower leg: No edema.   Skin:     General: Skin is warm and dry.      Coloration: Skin is not jaundiced.   Neurological:      General: No focal deficit present.      Mental Status: He is alert and oriented to person, place, and time.   Psychiatric:         Mood and Affect: Mood normal.         Behavior: Behavior normal.           Significant Labs: All pertinent labs within the past 24 hours have been reviewed.  CBC:   Recent Labs   Lab 04/04/25  0021   WBC 5.55   HGB 12.8*   HCT 43.0        CMP:   Recent Labs   Lab 04/04/25  0021      K 4.0      CO2 21*   BUN 5*   CREATININE 0.7   CALCIUM 9.3   ALBUMIN 4.1   BILITOT 0.3   ALKPHOS 134   *   *   ANIONGAP 16     Lipase:   Recent Labs   Lab 04/04/25  0021   LIPASE 30       Significant Imaging: I have reviewed all pertinent imaging results/findings within the past 24 hours.

## 2025-04-04 NOTE — ED NOTES
"Pt refusing to provide urine sample. Pt refusing lidocaine patch stating "That doesn't work, I want something stronger."   "

## 2025-04-04 NOTE — HPI
"Per Yoanna Solis MD:    "Tasia Cardona is a 24 year old male with PMHx of HIV on HAART, chronic pancreatitis with necrotizing component, splenic vein thrombosis (not on AC), polycythemia vera, and asthma who [place] [facility] [place] complaints of worsening left-sided abdominal pain, nausea/vomiting, inability to tolerate PO over the past 3 days following recent hospital discharge on 4/1. He notes uncontrolled pain currently rated 9/10 severity that was difficult to control with home meds. He feels that current symptoms have been similar to his previous episodes of pancreatitis and denies any change in quality or characteristics in regards to pain. Denies fever/chills, difficulty urinating, dysuria, chest pain, SOB. Of note, patient has multiple recent and past admissions/ED visits with similar presentation.      In the ED: Patient remained hemodynamically stable and afebrile. Hemoglobin decreased to 12.8. Hematocrit, WBC, and platelets WNL. CMP without electrolyte derangements. AST/ALT elevated to 140/148, which is increased from 61/68 on [place] [facility] [place]. Patient Hep C reactive. Chest x-ray without acute cardiopulmonary process. Lipase 30 from 167 on prior 5 days ago. Most recent CT AP 3/29 showed increasing fluid collection concerning for developing pseudocyst. Admitted to hospital medicine for further management."  "

## 2025-04-04 NOTE — ASSESSMENT & PLAN NOTE
History noted.     Recent Labs     04/04/25  0021   HGB 12.8*   HCT 43.0     Plan  - Monitor serial CBC: Daily  - Transfuse PRBC if patient becomes hemodynamically unstable, symptomatic or H/H drops below 7/21.History of. Previously received phlebotomy about every other month. No respiratory distress at this time.

## 2025-04-04 NOTE — PROVIDER TRANSFER
Outside Transfer Acceptance Note / [facility]    Referring facility: [facility]   Referring provider: ARMANDO DIAZ  Accepting facility: [facility] ([facility])  Accepting provider: BRITTNEY ABEL  Reason for transfer:  Capacity  Transfer diagnosis: A/C pancreatitis, pancreatic pseudocyst.  Transfer specialty requested: Hospital Medicine  Transfer specialty notified: No  Transfer level: NUMBER 1-5: 2  Bed type requested: MS  Isolation status: No active isolations   Admission class or status: OP- Observation      Narrative     24 M PMH HIV on HAART, chronic pancreatitis with necrotizing component, splenic vein thrombosis (not on AC), polycythemia vera, and asthma who presented to [facility] today (4/4) with complaints of worsening left-sided abdominal pain, nausea/ vomiting, inability to tolerate PO over the past 3 days following recent hospital discharge on 4/1.      VS:  /75, HR 71, RR 16, T 98.1°.  CT abd (3/29) showed significant interval decrease in size of posterior pancreatic fluid collection concerning for evolving pseudocyst (measuring 1.8 x 1.1 cm) Exam pos for abd tenderness Labs: Na 143, K 4.0, Cr 0.7, , , lipase 30.      Conservative management planned due to symptoms (more chronic than acute), imaging (decreasing size of collection) and  lipase (167 > 30) Transfer requested for capacity.  See H&P dated this morning.  Transfer diagnosis A/C pancreatitis, pancreatic pseudocyst.      Instructions      [facility]  Admit to Hospital Medicine  Upon patient arrival to floor, please contact Hospital Medicine on call.

## 2025-04-04 NOTE — ED TRIAGE NOTES
Tasia Cardona, a 24 y.o. male presents to the ED w/ complaint of abdominal pain. Hx pancreatitis     Triage note:  Chief Complaint   Patient presents with    Abdominal Pain     Left side abdominal pain +N/V/D. Reports dc from inpatient 2 days ago.     Review of patient's allergies indicates:   Allergen Reactions    [place] Swelling    Pecan nut Swelling    Penicillins Hives     Tolerates cephalosporins    Tolerated piperacillin/tazobactam 11/2023    Sulfa (sulfonamide antibiotics) Hives    Opioids - morphine analogues Itching, Nausea And Vomiting and Rash    Tolonium chloride(toluidine blue-o) Hives, Itching and Rash    Toradol [ketorolac] Rash     Past Medical History:   Diagnosis Date    Acute necrotizing pancreatitis 09/20/2023    Alcohol use disorder 10/05/2023    Asthma     Hepatitis C antibody positive in blood 09/18/2023 9/2023 PCR negative    HIV infection 10/2021    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   Patient identifiers for Tasia Cardona checked and correct.    LOC: The patient is awake, alert and aware of environment with an appropriate affect, the patient is oriented x 4 and speaking appropriately.    APPEARANCE: Patient resting comfortably and in no acute distress, patient is clean and well groomed, patient's clothing is properly fastened.    SKIN: The skin is warm and dry, color consistent with ethnicity, patient has normal skin turgor and moist mucus membranes, skin intact, no breakdown or bruising noted.    MUSCULOSKELETAL: Patient moving all extremities well, no obvious swelling or deformities noted.    RESPIRATORY: Airway is open and patent, respirations are spontaneous and even, patient has a normal effort and rate.    CARDIAC: Patient has a normal rate and rhythm, no periphreal edema  noted, capillary refill < 3 seconds.    ABDOMEN: Soft and non tender to palpation, no distention noted. Patient denies any nausea, vomiting, diarrhea, or constipation. Reports abd pain    NEUROLOGIC: Eyes open spontaneously, PERRL, behavior appropriate to situation, follows commands, facial expression symmetrical, bilateral hand grasp equal and even, purposeful motor response noted, normal sensation in all extremities.     HEENT: No abnormalities noted. White sclera and pupils equal round and reactive to light. Denies headache, dizziness.     : Pt voids independently, denies dysuria, hematuria, frequency.

## 2025-04-04 NOTE — ED PROVIDER NOTES
Encounter Date: 4/3/2025       History     Chief Complaint   Patient presents with    Abdominal Pain     Left side abdominal pain +N/V/D. Reports dc from inpatient 2 days ago.     HPI    Tasia Cardona is a 24 y.o. male with PMH of HIV, chronic pancreatitis presenting [place] [facility] [place] for [place] pain.  States that his pain is located in his left upper quadrant/epigastric area that is consistent with previous.  Endorses multiple episodes of emesis.  Patient has had some mild loose stool.  Patient has recently been evaluated multiple times in the ED and admission for an on.  For abdominal pain.      Review of patient's allergies indicates:   Allergen Reactions    [place] Swelling    Pecan nut Swelling    Penicillins Hives     Tolerates cephalosporins    Tolerated piperacillin/tazobactam 11/2023    Sulfa (sulfonamide antibiotics) Hives    Opioids - morphine analogues Itching, Nausea And Vomiting and Rash    Tolonium chloride(toluidine blue-o) Hives, Itching and Rash    Toradol [ketorolac] Rash     Past Medical History:   Diagnosis Date    Acute necrotizing pancreatitis 09/20/2023    Alcohol use disorder 10/05/2023    Asthma     Hepatitis C antibody positive in blood 09/18/2023 9/2023 PCR negative    HIV infection 10/2021    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     Past Surgical History:   Procedure Laterality Date    ENDOSCOPIC ULTRASOUND OF UPPER GASTROINTESTINAL TRACT N/A 10/23/2023    Procedure: ULTRASOUND, UPPER GI TRACT, ENDOSCOPIC;  Surgeon: Emil Villatoro MD;  Location: [facility] ([address] [place] [address]);  Service: Endoscopy;  Laterality: N/A;    ENDOSCOPIC ULTRASOUND OF UPPER GASTROINTESTINAL TRACT N/A 7/24/2024    Procedure: ULTRASOUND, UPPER GI TRACT, ENDOSCOPIC;  Surgeon: Faustino Trujillo MD;  Location: [facility] ENDO  (2ND FLR);  Service: Endoscopy;  Laterality: N/A;    ERCP N/A 10/23/2023    Procedure: ERCP (ENDOSCOPIC RETROGRADE CHOLANGIOPANCREATOGRAPHY);  Surgeon: Emil Villatoro MD;  [facility]  Service: Endoscopy;  Laterality: N/A;    ESOPHAGOGASTRODUODENOSCOPY N/A 9/18/2023    Procedure: EGD (ESOPHAGOGASTRODUODENOSCOPY);  Surgeon: Kg Cleaning MD;  [facility]  Service: Endoscopy;  Laterality: N/A;    ESOPHAGOGASTRODUODENOSCOPY N/A 3/8/2024    Procedure: EGD (ESOPHAGOGASTRODUODENOSCOPY);  Surgeon: Greg Love MD;  [facility]  Service: Endoscopy;  Laterality: N/A;    ESOPHAGOGASTRODUODENOSCOPY N/A 7/24/2024    Procedure: EGD (ESOPHAGOGASTRODUODENOSCOPY);  Surgeon: Faustino Trujillo MD;  Location: [facility] ([address] [place] [address]);  Service: Endoscopy;  Laterality: N/A;     Family History   Problem Relation Name Age of Onset    Pancreatitis Mother      Cancer Mother      Ovarian cancer Mother      No Known Problems Father      Cancer Brother      Breast cancer Maternal Grandmother       Social History[1]  Review of Systems   Constitutional:  Negative for fever.   HENT:  Negative for congestion, rhinorrhea and sore throat.    Eyes:  Negative for visual disturbance.   Respiratory:  Negative for cough and shortness of breath.    Cardiovascular:  Negative for chest pain and leg swelling.   Gastrointestinal:  Positive for abdominal pain, diarrhea, nausea and vomiting.   Genitourinary:  Negative for dysuria and hematuria.   Skin:  Negative for rash.   Neurological:  Negative for weakness.       Physical Exam     Initial Vitals [04/03/25 2055]   BP Pulse Resp Temp SpO2   129/76 107 16 98.7 °F (37.1 °C) 97 %      MAP       --         Physical Exam    Nursing note and vitals reviewed.  Constitutional: He appears well-developed and well-nourished. He is cooperative.  Non-toxic appearance. He does not appear ill.   HENT:   Head: Normocephalic and atraumatic. Mouth/Throat: Mucous  membranes are normal. Mucous membranes are not dry.   Eyes: Conjunctivae are normal.   Neck: Phonation normal.   Cardiovascular:  Normal rate, regular rhythm and normal heart sounds.     Exam reveals no gallop, no S3, no S4 and no friction rub.       No murmur heard.  Pulmonary/Chest: Breath sounds normal. No respiratory distress. He has no wheezes. He has no rhonchi. He has no rales.   Abdominal: Abdomen is soft. He exhibits no distension. There is abdominal tenderness in the epigastric area and left upper quadrant.   Musculoskeletal:      Right lower leg: No edema.      Left lower leg: No edema.     Neurological: He is alert.   Skin: Skin is warm, dry and intact. Capillary refill takes less than 2 seconds.   Psychiatric: He has a normal mood and affect. His speech is normal.         ED Course   Procedures  Labs Reviewed   HEPATITIS C ANTIBODY - Abnormal       Result Value    Hep C Ab Interp Reactive (*)    COMPREHENSIVE METABOLIC PANEL - Abnormal    Sodium 143      Potassium 4.0      Chloride 106      CO2 21 (*)     Glucose 141 (*)     BUN 5 (*)     Creatinine 0.7      Calcium 9.3      Protein Total 8.9 (*)     Albumin 4.1      Bilirubin Total 0.3             (*)      (*)     Anion Gap 16      eGFR >60     CBC WITH DIFFERENTIAL - Abnormal    WBC 5.55      RBC 5.30      HGB 12.8 (*)     HCT 43.0      MCV 81 (*)     MCH 24.2 (*)     MCHC 29.8 (*)     RDW 19.6 (*)     Platelet Count 263      MPV 10.4      Nucleated RBC 0      Neut % 34.9 (*)     Lymph % 48.3 (*)     Mono % 12.6      Eos % 2.9      Basophil % 0.9      Imm Grans % 0.4      Neut # 1.94      Lymph # 2.68      Mono # 0.70      Eos # 0.16      Baso # 0.05      Imm Grans # 0.02     LIPASE - Normal    Lipase Level 30     PLATELET REVIEW - Normal    Platelet Estimate Appears Normal     CBC W/ AUTO DIFFERENTIAL    Narrative:     The following orders were created for panel order CBC auto differential.  Procedure                                Abnormality         Status                     ---------                               -----------         ------                     CBC with Differential[[id number]]       Abnormal            Final result                 Please view results for these tests on the individual orders.   URINALYSIS, REFLEX TO URINE CULTURE   PLATELET REVIEW   HEPATITIS C RNA, QUANTITATIVE, PCR   ALCOHOL,URINE MEDICAL (ETHANOL)   TOXICOLOGY SCREEN, URINE, RANDOM (COMPLIANCE)   T-HELPER CELLS (CD4) COUNT          Imaging Results              X-Ray Chest AP Portable (Final result)  Result time 04/04/25 06:12:04      Final result by Francesco Mei MD (04/04/25 06:12:04)                   Impression:      No acute cardiopulmonary process.    Electronically signed by resident: Rafita Irby  Date:    04/04/2025  Time:    03:07    Electronically signed by: Francesco Mei  Date:    04/04/2025  Time:    06:12               Narrative:    EXAMINATION:  XR CHEST AP PORTABLE    CLINICAL HISTORY:  Vomiting, unspecified    TECHNIQUE:  Single frontal view of the chest was performed.    COMPARISON:  Chest radiograph 09/17/2024    FINDINGS:  Trachea and mediastinal structures are midline.  Cardiopericardial silhouette is normal in size.  Hilar contours are normal.    Lungs are symmetrically expanded.  No focal consolidation, pleural effusion or pneumothorax.    No subdiaphragmatic free air. Visualized osseous structures are intact.  Soft tissues are within normal limits.                        Preliminary result by Rafita Irby MD (04/04/25 03:08:28)                   Impression:      No acute cardiopulmonary process.    Electronically signed by resident: Rafita Irby  Date:    04/04/2025  Time:    03:07                 Narrative:    EXAMINATION:  XR CHEST AP PORTABLE    CLINICAL HISTORY:  Vomiting, unspecified    TECHNIQUE:  Single frontal view of the chest was performed.    COMPARISON:  Chest radiograph 09/17/2024    FINDINGS:  Trachea  and mediastinal structures are midline.  Cardiopericardial silhouette is normal in size.  Hilar contours are normal.    Lungs are symmetrically expanded.  No focal consolidation, pleural effusion or pneumothorax.    No subdiaphragmatic free air. Visualized osseous structures are intact.  Soft tissues are within normal limits.                                       Medications   sodium chloride 0.9% flush 10 mL (has no administration in time range)   naloxone 0.4 mg/mL injection 0.02 mg (has no administration in time range)   glucose chewable tablet 16 g (has no administration in time range)   glucose chewable tablet 24 g (has no administration in time range)   dextrose 50% injection 12.5 g (has no administration in time range)   dextrose 50% injection 25 g (has no administration in time range)   glucagon (human recombinant) injection 1 mg (has no administration in time range)   enoxaparin injection 40 mg (has no administration in time range)   xbtmypela-nhecpclh-rlxzorc ala -25 mg (25 kg or greater) 1 tablet (1 tablet Oral Given 4/4/25 0807)   acetaminophen tablet 1,000 mg (has no administration in time range)   aluminum-magnesium hydroxide-simethicone 200-200-20 mg/5 mL suspension 30 mL (has no administration in time range)   diphenhydrAMINE capsule 25 mg (has no administration in time range)   LIDOcaine 5 % patch 1 patch (1 patch Transdermal Not Given 4/4/25 0603)   melatonin tablet 6 mg (has no administration in time range)   senna-docusate 8.6-50 mg per tablet 1 tablet (1 tablet Oral Given 4/4/25 0807)   simethicone chewable tablet 80 mg (has no administration in time range)   ondansetron injection 8 mg (8 mg Intravenous Given 4/4/25 0935)   lactated ringers infusion ( Intravenous New Bag 4/4/25 0604)   HYDROmorphone (PF) injection 1 mg (1 mg Intravenous Given 4/4/25 0807)   prochlorperazine injection Soln 5 mg (has no administration in time range)   HYDROmorphone (PF) injection 1 mg (1 mg Intravenous Given  4/4/25 0152)   ondansetron injection 4 mg (4 mg Intravenous Given 4/4/25 0153)   sodium chloride 0.9% bolus 1,000 mL 1,000 mL (0 mLs Intravenous Stopped 4/4/25 0251)   HYDROmorphone (PF) injection 1 mg (1 mg Intravenous Given 4/4/25 6336)     Medical Decision Making  24 year old male with hx of pancreatitis presenting with abdominal pain. Initially, pt is afebrile, HDS.    Presentation and exam are consistent with pancreatitis. Previous CT on 3/28 showing improving pseudocyst. Patient has not had an acute worsening of symptoms and has no fever, unnecessary to CT. Will give dilaudid for pain and zofran for nausea.  The patient has mild tenderness to palpation, his abdomen is soft, he is hemodynamically stable, he is very well appearing, low suspicion for surgical intra-abdominal pathology at this time.    Upon reassessment, patient endorses continued pain.  Will give additional dose of pain medications.  Patient admitted to hospital medicine for observation for pancreatitis and intractable abdominal pain.    Amount and/or Complexity of Data Reviewed  Radiology: ordered.    Risk  Prescription drug management.                                      Clinical Impression:  Final diagnoses:  [R11.10] Vomiting  [K86.1] Other chronic pancreatitis (Primary)          ED Disposition Condition    Observation Stable                  [1]   Social History  Tobacco Use    Smoking status: Former     Types: Cigarettes    Smokeless tobacco: Never   Substance Use Topics    Alcohol use: Not Currently    Drug use: Yes     Types: Marijuana        Gayathri Monet MD  Resident  04/04/25 [id number]

## 2025-04-04 NOTE — FIRST PROVIDER EVALUATION
Emergency Department TeleTriage Encounter Note      CHIEF COMPLAINT    Chief Complaint   Patient presents with    Abdominal Pain     Left side abdominal pain +N/V/D. Reports dc from inpatient 2 days ago.       VITAL SIGNS   Initial Vitals [04/03/25 2055]   BP Pulse Resp Temp SpO2   129/76 107 16 98.7 °F (37.1 °C) 97 %      MAP       --            ALLERGIES    Review of patient's allergies indicates:   Allergen Reactions    [place] Swelling    Pecan nut Swelling    Penicillins Hives     Tolerates cephalosporins    Tolerated piperacillin/tazobactam 11/2023    Sulfa (sulfonamide antibiotics) Hives    Opioids - morphine analogues Itching, Nausea And Vomiting and Rash    Tolonium chloride(toluidine blue-o) Hives, Itching and Rash    Toradol [ketorolac] Rash       PROVIDER TRIAGE NOTE  Recently discharged for pancreatitis and states he continues to have abdominal pain and vomiting. Denies fever.     Limited physical exam via telehealth: The patient is awake, alert, answering questions appropriately and is not in respiratory distress.  As the Teletriage provider, I performed an initial assessment and ordered appropriate labs and imaging studies, if any, to facilitate the patient's care once placed in the ED. Once a room is available, care and a full evaluation will be completed by an alternate ED provider.  Any additional orders and the final disposition will be determined by that provider.  All imaging and labs will not be followed-up by the Teletriage Team, including myself.          ORDERS  Labs Reviewed   HEPATITIS C ANTIBODY       ED Orders (720h ago, onward)      Start Ordered     Status Ordering Provider    04/03/25 2212 04/03/25 2211  Urinalysis, Reflex to Urine Culture Urine, Clean Catch  STAT         Ordered HEMANTH HALE    04/03/25 2211 04/03/25 2210  Saline lock IV  Once         Ordered HEMANTH HALE    04/03/25 2211 04/03/25 2210  CBC auto differential  STAT         Ordered HEMANTH HALE     04/03/25 2211 04/03/25 2210  Comprehensive Metabolic Panel  STAT         Ordered HEMANTH HALE    04/03/25 2211 04/03/25 2210  Lipase  STAT         Ordered HEMANTH HALE    04/03/25 2211 04/03/25 2210  CBC with Differential  PROCEDURE ONCE         Ordered HEMANTH HALE    04/03/25 2057 04/03/25 2056  Hepatitis C Antibody  STAT         Ordered DENTON PADILLA              Virtual Visit Note: The provider triage portion of this emergency department evaluation and documentation was performed via [email or web address], a HIPAA-compliant telemedicine application, in concert with a tele-presenter in the room. A face to face patient evaluation with one of my colleagues will occur once the patient is placed in an emergency department room.      DISCLAIMER: This note was prepared with [employer] voice recognition transcription software. Garbled syntax, mangled pronouns, and other bizarre constructions may be attributed to that software system.

## 2025-04-04 NOTE — ED NOTES
Pt refusing to provide urine sample. Pt states he does not have to pee and we are not doing any invasive procedures to get it. He will go when he is ready.

## 2025-04-04 NOTE — H&P
[facility] - Emergency Dept  Hospital Medicine  History & Physical    Patient Name: Tasia Cardona  MRN: [id number]  Patient Class: OP- Observation  Admission Date: 4/4/2025  Attending Physician: Gomez Mao MD   Primary Care Provider: Pina Jones NP       Patient information was obtained from patient, past medical records, and ER records.     Subjective:     Principal Problem:Chronic pancreatitis    Chief Complaint:   Chief Complaint   Patient presents with    Abdominal Pain     Left side abdominal pain +N/V/D. Reports dc from inpatient 2 days ago.        HPI: Tasia Cardona is a 24 year old male with PMHx of HIV on HAART, chronic pancreatitis with necrotizing component, splenic vein thrombosis (not on AC), polycythemia vera, and asthma who [place] [facility] [place] complaints of worsening left-sided abdominal pain, nausea/vomiting, inability to tolerate PO over the past 3 days following recent hospital discharge on 4/1. He notes uncontrolled pain currently rated 9/10 severity that was difficult to control with home meds. He feels that current symptoms have been similar to his previous episodes of pancreatitis and denies any change in quality or characteristics in regards to pain. Denies fever/chills, difficulty urinating, dysuria, chest pain, SOB. Of note, patient has multiple recent and past admissions/ED visits with similar presentation.      In the ED: Patient remained hemodynamically stable and afebrile. Hemoglobin decreased to 12.8. Hematocrit, WBC, and platelets WNL. CMP without electrolyte derangements. AST/ALT elevated to 140/148, which is increased from 61/68 on [place] [facility] [place]. Patient Hep C reactive. Chest x-ray without acute cardiopulmonary process. Lipase 30 from 167 on prior 5 days ago. Most recent CT AP 3/29 showed increasing fluid collection concerning for developing pseudocyst. Admitted to hospital medicine for further management.       Past Medical History:   Diagnosis Date     Acute necrotizing pancreatitis 09/20/2023    Alcohol use disorder 10/05/2023    Asthma     Hepatitis C antibody positive in blood 09/18/2023 9/2023 PCR negative    HIV infection 10/2021    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       Past Surgical History:   Procedure Laterality Date    ENDOSCOPIC ULTRASOUND OF UPPER GASTROINTESTINAL TRACT N/A 10/23/2023    Procedure: ULTRASOUND, UPPER GI TRACT, ENDOSCOPIC;  Surgeon: Emil Villatoro MD;  [facility]  Service: Endoscopy;  Laterality: N/A;    ENDOSCOPIC ULTRASOUND OF UPPER GASTROINTESTINAL TRACT N/A 7/24/2024    Procedure: ULTRASOUND, UPPER GI TRACT, ENDOSCOPIC;  Surgeon: Faustino Trujillo MD;  Location[facility]  Service: Endoscopy;  Laterality: N/A;    ERCP N/A 10/23/2023    Procedure: ERCP (ENDOSCOPIC RETROGRADE CHOLANGIOPANCREATOGRAPHY);  Surgeon: Emil Villatoro MD;  Location[facility]  Service: Endoscopy;  Laterality: N/A;    ESOPHAGOGASTRODUODENOSCOPY N/A 9/18/2023    Procedure: EGD (ESOPHAGOGASTRODUODENOSCOPY);  Surgeon: Kg Cleaning MD;  Location: [facility] (2ND FLR);  Service: Endoscopy;  Laterality: N/A;    ESOPHAGOGASTRODUODENOSCOPY N/A 3/8/2024    Procedure: EGD (ESOPHAGOGASTRODUODENOSCOPY);  Surgeon: Greg Love MD;  Location: [facility] ENDO (2ND FLR);  Service: Endoscopy;  Laterality: N/A;    ESOPHAGOGASTRODUODENOSCOPY N/A 7/24/2024    Procedure: EGD (ESOPHAGOGASTRODUODENOSCOPY);  Surgeon: Faustino Trujillo MD;  Location: [facility] ENDO (2ND FLR);  Service: Endoscopy;  Laterality: N/A;       Review of patient's allergies indicates:   Allergen Reactions    [place] Swelling    Pecan nut Swelling    Penicillins Hives     Tolerates cephalosporins    Tolerated piperacillin/tazobactam 11/2023    Sulfa  (sulfonamide antibiotics) Hives    Opioids - morphine analogues Itching, Nausea And Vomiting and Rash    Tolonium chloride(toluidine blue-o) Hives, Itching and Rash    Toradol [ketorolac] Rash       No current facility-administered medications on file prior to encounter.     Current Outpatient Medications on File Prior to Encounter   Medication Sig    pjxogqxti-dpnkzhjs-lhfokeu ala (BIKTARVY) -25 mg (25 kg or greater) Take 1 tablet by mouth once daily.    lactulose (CHRONULAC) 10 gram/15 mL solution Take 15 mLs (10 g total) by mouth 2 (two) times daily as needed (constipation).    ondansetron (ZOFRAN-ODT) 4 MG TbDL Take 1 tablet (4 mg total) by mouth every 6 (six) hours as needed (Nausea).    oxyCODONE (ROXICODONE) 5 MG immediate release tablet Take 1 tablet (5 mg total) by mouth every 4 (four) hours as needed for Pain.    pantoprazole (PROTONIX) 40 MG tablet Take 1 tablet (40 mg total) by mouth once daily.    promethazine (PHENERGAN) 25 MG tablet Take 1 tablet (25 mg total) by mouth every 6 (six) hours as needed for Nausea.    senna-docusate (PERICOLACE) 8.6-50 mg per tablet Take 1 tablet by mouth 2 (two) times daily.     Family History       Problem Relation (Age of Onset)    Breast cancer Maternal Grandmother    Cancer Mother, Brother    No Known Problems Father    Ovarian cancer Mother    Pancreatitis Mother          Tobacco Use    Smoking status: Former     Types: Cigarettes    Smokeless tobacco: Never   Substance and Sexual Activity    Alcohol use: Not Currently    Drug use: Yes     Types: Marijuana    Sexual activity: Not on file     Review of Systems   Constitutional:  Positive for appetite change. Negative for chills, fatigue and fever.   HENT:  Negative for congestion, sore throat and trouble swallowing.    Eyes:  Negative for visual disturbance.   Respiratory:  Negative for cough, chest tightness and shortness of breath.    Cardiovascular:  Negative for chest pain, palpitations and leg swelling.    Gastrointestinal:  Positive for abdominal pain, nausea and vomiting. Negative for abdominal distention, blood in stool, constipation and diarrhea.   Genitourinary:  Negative for dysuria.   Musculoskeletal:  Negative for arthralgias and myalgias.   Skin:  Negative for rash.   Neurological:  Negative for dizziness and light-headedness.   Psychiatric/Behavioral:  Negative for confusion and decreased concentration.      Objective:     Vital Signs (Most Recent):  Temp: 98.1 °F (36.7 °C) (04/04/25 0600)  Pulse: 80 (04/04/25 0600)  Resp: 16 (04/04/25 0600)  BP: 128/75 (04/04/25 0600)  SpO2: 99 % (04/04/25 0600) Vital Signs (24h Range):  Temp:  [98.1 °F (36.7 °C)-98.8 °F (37.1 °C)] 98.1 °F (36.7 °C)  Pulse:  [] 80  Resp:  [16-18] 16  SpO2:  [97 %-99 %] 99 %  BP: (116-130)/(62-80) 128/75     Weight: 77.1 kg (170 lb)  Body mass index is 24.39 kg/m².     Physical Exam  Vitals and nursing note reviewed.   Constitutional:       General: He is not in acute distress.     Appearance: Normal appearance. He is not ill-appearing, toxic-appearing or diaphoretic.      Comments: Resting comfortably throughout length of exam, no acute distress   Eyes:      General: No scleral icterus.     Conjunctiva/sclera: Conjunctivae normal.   Cardiovascular:      Rate and Rhythm: Normal rate and regular rhythm.      Pulses: Normal pulses.      Heart sounds: Normal heart sounds. No murmur heard.  Pulmonary:      Effort: Pulmonary effort is normal. No respiratory distress.      Breath sounds: Normal breath sounds. No wheezing, rhonchi or rales.   Abdominal:      General: Abdomen is flat. Bowel sounds are normal. There is no distension.      Palpations: Abdomen is soft.      Tenderness: There is generalized abdominal tenderness and tenderness in the epigastric area, periumbilical area and left upper quadrant. There is no right CVA tenderness or left CVA tenderness.   Musculoskeletal:         General: No swelling.      Right lower leg: No edema.       Left lower leg: No edema.   Skin:     General: Skin is warm and dry.      Coloration: Skin is not jaundiced.   Neurological:      General: No focal deficit present.      Mental Status: He is alert and oriented to person, place, and time.   Psychiatric:         Mood and Affect: Mood normal.         Behavior: Behavior normal.           Significant Labs: All pertinent labs within the past 24 hours have been reviewed.  CBC:   Recent Labs   Lab 04/04/25 0021   WBC 5.55   HGB 12.8*   HCT 43.0        CMP:   Recent Labs   Lab 04/04/25 0021      K 4.0      CO2 21*   BUN 5*   CREATININE 0.7   CALCIUM 9.3   ALBUMIN 4.1   BILITOT 0.3   ALKPHOS 134   *   *   ANIONGAP 16     Lipase:   Recent Labs   Lab 04/04/25 0021   LIPASE 30       Significant Imaging: I have reviewed all pertinent imaging results/findings within the past 24 hours.  Assessment/Plan:     Assessment & Plan  Chronic pancreatitis  Nausea and vomiting  Fluid collection of pancreas  24M with hx of HIV on HAART, chronic pancreatitis with necrotizing component, splenic vein thrombosis (not on AC), polycythemia vera, and asthma who presents to [facility] with complaints of worsening left-sided abdominal pain, nausea/vomiting, inability to tolerate PO over the past 3 days following recent hospital discharge on 4/1. ED admit for presumed acute on chronic pancreatitis given patient symptoms similar to previous episodes. Lipase 30 compared to 167 noted from prior 5 days ago.    -supportive management   -maintenance fluids, antiemetics PRN  -initiate early feeding as tolerated  -dilaudid IV 2mg q4h prn until able to tolerate PO   -transition to more feasible pain regimen when able    HIV infection  Diagnosed on screening test 10/2021.  History of previous gonorrhea 2021. Last CD4 1080 on 10/12/23. Last ID appointment 4/3 but no note posted.     -Continue HAART (Biktarvy)  Polycythemia vera  History noted.     Recent Labs     04/04/25 0021    HGB 12.8*   HCT 43.0     Plan  - Monitor serial CBC: Daily  - Transfuse PRBC if patient becomes hemodynamically unstable, symptomatic or H/H drops below 7/21.History of. Previously received phlebotomy about every other month. No respiratory distress at this time.   Splenic vein thrombosis  History of, not currently on anticoagulation   LFT elevation  Hepatitis C antibody detected  AST/ALT elevated to 140/148 on admission, which is increased from 61/68 on discharge [place] [facility] on [place]01. Patient with history of Hep C antibody reactive with imaging findings of hepatic steatosis (US 3/30 and CT AP on 3/29). Recent RUQ ultrasound stable. Continue to monitor, trend CMP daily.     Hepatitis C antibody reactive on admission. On 11/4/2021 Hep C ab+, viral load negative.    -Consider PCR   VTE Risk Mitigation (From admission, onward)           Ordered     enoxaparin injection 40 mg  Daily         04/04/25 0544     IP VTE HIGH RISK PATIENT  Once         04/04/25 0544     Place sequential compression device  Until discontinued         04/04/25 0544                          Yoanna Solis MD  Department of Hospital Medicine  [facility] - Emergency Dept

## 2025-04-04 NOTE — ASSESSMENT & PLAN NOTE
AST/ALT elevated to 140/148 on admission, which is increased from 61/68 on discharge from [facility] on 04/01. Patient with history of Hep C antibody reactive with imaging findings of hepatic steatosis (US 3/30 and CT AP on 3/29). Recent RUQ ultrasound stable. Continue to monitor, trend CMP daily.     Hepatitis C antibody reactive on admission. On 11/4/2021 Hep C ab+, viral load negative.    -Consider PCR

## 2025-04-05 LAB
ABSOLUTE EOSINOPHIL (OHS): 0.14 K/UL
ABSOLUTE MONOCYTE (OHS): 0.57 K/UL (ref 0.3–1)
ABSOLUTE NEUTROPHIL COUNT (OHS): 1.02 K/UL (ref 1.8–7.7)
ALBUMIN SERPL BCP-MCNC: 3.1 G/DL (ref 3.5–5.2)
ALP SERPL-CCNC: 90 UNIT/L (ref 40–150)
ALT SERPL W/O P-5'-P-CCNC: 91 UNIT/L (ref 10–44)
ANION GAP (OHS): 5 MMOL/L (ref 8–16)
AST SERPL-CCNC: 62 UNIT/L (ref 11–45)
BASOPHILS # BLD AUTO: 0.03 K/UL
BASOPHILS NFR BLD AUTO: 0.8 %
BILIRUB SERPL-MCNC: 0.4 MG/DL (ref 0.1–1)
BUN SERPL-MCNC: 12 MG/DL (ref 6–20)
CALCIUM SERPL-MCNC: 8.4 MG/DL (ref 8.7–10.5)
CHLORIDE SERPL-SCNC: 106 MMOL/L (ref 95–110)
CO2 SERPL-SCNC: 23 MMOL/L (ref 23–29)
CREAT SERPL-MCNC: 0.6 MG/DL (ref 0.5–1.4)
ERYTHROCYTE [DISTWIDTH] IN BLOOD BY AUTOMATED COUNT: 18.2 % (ref 11.5–14.5)
GFR SERPLBLD CREATININE-BSD FMLA CKD-EPI: >60 ML/MIN/1.73/M2
GLUCOSE SERPL-MCNC: 118 MG/DL (ref 70–110)
HCT VFR BLD AUTO: 34.3 % (ref 40–54)
HGB BLD-MCNC: 10.4 GM/DL (ref 14–18)
IMM GRANULOCYTES # BLD AUTO: 0.01 K/UL (ref 0–0.04)
IMM GRANULOCYTES NFR BLD AUTO: 0.3 % (ref 0–0.5)
LYMPHOCYTES # BLD AUTO: 1.8 K/UL (ref 1–4.8)
MAGNESIUM SERPL-MCNC: 1.8 MG/DL (ref 1.6–2.6)
MCH RBC QN AUTO: 24.6 PG (ref 27–31)
MCHC RBC AUTO-ENTMCNC: 30.3 G/DL (ref 32–36)
MCV RBC AUTO: 81 FL (ref 82–98)
NUCLEATED RBC (/100WBC) (OHS): 0 /100 WBC
PHOSPHATE SERPL-MCNC: 5.2 MG/DL (ref 2.7–4.5)
PLATELET # BLD AUTO: 163 K/UL (ref 150–450)
PMV BLD AUTO: 9.7 FL (ref 9.2–12.9)
POTASSIUM SERPL-SCNC: 3.9 MMOL/L (ref 3.5–5.1)
PROT SERPL-MCNC: 6.3 GM/DL (ref 6–8.4)
RBC # BLD AUTO: 4.23 M/UL (ref 4.6–6.2)
RELATIVE EOSINOPHIL (OHS): 3.9 %
RELATIVE LYMPHOCYTE (OHS): 50.4 % (ref 18–48)
RELATIVE MONOCYTE (OHS): 16 % (ref 4–15)
RELATIVE NEUTROPHIL (OHS): 28.6 % (ref 38–73)
SODIUM SERPL-SCNC: 134 MMOL/L (ref 136–145)
WBC # BLD AUTO: 3.57 K/UL (ref 3.9–12.7)

## 2025-04-05 PROCEDURE — 83735 ASSAY OF MAGNESIUM: CPT

## 2025-04-05 PROCEDURE — 63600175 PHARM REV CODE 636 W HCPCS: Performed by: NURSE PRACTITIONER

## 2025-04-05 PROCEDURE — 25000003 PHARM REV CODE 250: Performed by: NURSE PRACTITIONER

## 2025-04-05 PROCEDURE — 25000003 PHARM REV CODE 250

## 2025-04-05 PROCEDURE — 11000001 HC ACUTE MED/SURG PRIVATE ROOM

## 2025-04-05 PROCEDURE — 82947 ASSAY GLUCOSE BLOOD QUANT: CPT

## 2025-04-05 PROCEDURE — 25000003 PHARM REV CODE 250: Performed by: HOSPITALIST

## 2025-04-05 PROCEDURE — 94761 N-INVAS EAR/PLS OXIMETRY MLT: CPT

## 2025-04-05 PROCEDURE — 63600175 PHARM REV CODE 636 W HCPCS

## 2025-04-05 PROCEDURE — 36415 COLL VENOUS BLD VENIPUNCTURE: CPT

## 2025-04-05 PROCEDURE — 84100 ASSAY OF PHOSPHORUS: CPT

## 2025-04-05 PROCEDURE — 85025 COMPLETE CBC W/AUTO DIFF WBC: CPT

## 2025-04-05 RX ORDER — SODIUM CHLORIDE 9 MG/ML
INJECTION, SOLUTION INTRAVENOUS CONTINUOUS
Status: DISCONTINUED | OUTPATIENT
Start: 2025-04-05 | End: 2025-04-07 | Stop reason: HOSPADM

## 2025-04-05 RX ADMIN — HYDROMORPHONE HYDROCHLORIDE 2 MG: 2 INJECTION INTRAMUSCULAR; INTRAVENOUS; SUBCUTANEOUS at 10:04

## 2025-04-05 RX ADMIN — ONDANSETRON 8 MG: 2 INJECTION INTRAMUSCULAR; INTRAVENOUS at 05:04

## 2025-04-05 RX ADMIN — SENNOSIDES AND DOCUSATE SODIUM 1 TABLET: 50; 8.6 TABLET ORAL at 10:04

## 2025-04-05 RX ADMIN — HYDROMORPHONE HYDROCHLORIDE 2 MG: 2 INJECTION INTRAMUSCULAR; INTRAVENOUS; SUBCUTANEOUS at 06:04

## 2025-04-05 RX ADMIN — HYDROMORPHONE HYDROCHLORIDE 2 MG: 2 INJECTION INTRAMUSCULAR; INTRAVENOUS; SUBCUTANEOUS at 02:04

## 2025-04-05 RX ADMIN — MELATONIN TAB 3 MG 6 MG: 3 TAB at 10:04

## 2025-04-05 RX ADMIN — SODIUM CHLORIDE: 9 INJECTION, SOLUTION INTRAVENOUS at 04:04

## 2025-04-05 RX ADMIN — SENNOSIDES AND DOCUSATE SODIUM 1 TABLET: 50; 8.6 TABLET ORAL at 08:04

## 2025-04-05 RX ADMIN — PROMETHAZINE HYDROCHLORIDE 6.25 MG: 25 INJECTION INTRAMUSCULAR; INTRAVENOUS at 12:04

## 2025-04-05 RX ADMIN — BICTEGRAVIR SODIUM, EMTRICITABINE, AND TENOFOVIR ALAFENAMIDE FUMARATE 1 TABLET: 50; 200; 25 TABLET ORAL at 10:04

## 2025-04-05 NOTE — PLAN OF CARE
Pt lying in bed. Assessment completed; no pain or distress reported. Call light within reach and bed lowered.     1700  VSS on RA and afebrile this shift.  Pain managed this shift. Good appetite and good UOP this shift. Repositions self independently in bed and ambulating around room. Free from injury or skin breakdown. Fall precautions maintained and call light in reach. POC updated questions answered and comments acknowledged.  Purposeful hourly rounding completed this shift.

## 2025-04-05 NOTE — ASSESSMENT & PLAN NOTE
Continue intravenous fluids, pain medication, supportive care.  Denies recent alcohol use.  Check PETH.  Pancreatitis not felt to be related to his anti-retroviral therapy.

## 2025-04-05 NOTE — HOSPITAL COURSE
Patient is a 24-year-old man with polycythemia vera, HIV on anti-retroviral therapy, chronic pancreatitis admitted to [facility] with the acute on chronic pancreatitis.  Patient transferred to [facility] for ongoing care do capacity issues.

## 2025-04-05 NOTE — PROGRESS NOTES
"[facility] [address] Medicine  Progress Note    Patient Name: Tasia Cardona  MRN: [id number]  Patient Class: IP- Inpatient   Admission Date: 4/4/2025  Length of Stay: 0 days  Attending Physician: Allan Jones MD  Primary Care Provider: Pina Jones NP        Subjective     Principal Problem:Acute on chronic pancreatitis        HPI:  Per Yoanna Solis MD:    "Tasia Cardona is a 24 year old male with PMHx of HIV on HAART, chronic pancreatitis with necrotizing component, splenic vein thrombosis (not on AC), polycythemia vera, and asthma who presents to [facility] with complaints of worsening left-sided abdominal pain, nausea/vomiting, inability to tolerate PO over the past 3 days following recent hospital discharge on 4/1. He notes uncontrolled pain currently rated 9/10 severity that was difficult to control with home meds. He feels that current symptoms have been similar to his previous episodes of pancreatitis and denies any change in quality or characteristics in regards to pain. Denies fever/chills, difficulty urinating, dysuria, chest pain, SOB. Of note, patient has multiple recent and past admissions/ED visits with similar presentation.      In the ED: Patient remained hemodynamically stable and afebrile. Hemoglobin decreased to 12.8. Hematocrit, WBC, and platelets WNL. CMP without electrolyte derangements. AST/ALT elevated to 140/148, which is increased from 61/68 on discharge from [facility] on 04/01. Patient Hep C reactive. Chest x-ray without acute cardiopulmonary process. Lipase 30 from 167 on prior 5 days ago. Most recent CT AP 3/29 showed increasing fluid collection concerning for developing pseudocyst. Admitted to hospital medicine for further management."    Overview/Hospital Course:  Patient is a 24-year-old man with polycythemia vera, HIV on anti-retroviral therapy, chronic pancreatitis admitted to [facility] with the acute on chronic pancreatitis.  Patient " transferred to [facility] for ongoing care do capacity issues.    Interval History: Pain better.  Tolerating oral intake.    Review of Systems   Constitutional:  Negative for chills and fever.   Respiratory:  Negative for shortness of breath.    Cardiovascular:  Negative for chest pain.   Gastrointestinal:  Positive for abdominal pain. Negative for constipation, diarrhea, nausea and vomiting.     Objective:     Vital Signs (Most Recent):  Temp: 98.4 °F (36.9 °C) (04/05/25 1135)  Pulse: 75 (04/05/25 1135)  Resp: 17 (04/05/25 1405)  BP: 133/86 (04/05/25 1405)  SpO2: 97 % (04/05/25 1135) Vital Signs (24h Range):  Temp:  [97 °F (36.1 °C)-98.7 °F (37.1 °C)] 98.4 °F (36.9 °C)  Pulse:  [64-80] 75  Resp:  [16-20] 17  SpO2:  [96 %-100 %] 97 %  BP: (115-135)/(70-93) 133/86     Weight: 80.2 kg (176 lb 12.9 oz)  Body mass index is 25.37 kg/m².    Intake/Output Summary (Last 24 hours) at 4/5/2025 1441  Last data filed at 4/4/2025 1759  Gross per 24 hour   Intake 240 ml   Output 0 ml   Net 240 ml         Physical Exam  Cardiovascular:      Rate and Rhythm: Normal rate and regular rhythm.      Heart sounds: Normal heart sounds. No murmur heard.     No friction rub. No gallop.   Pulmonary:      Effort: Pulmonary effort is normal. No respiratory distress.      Breath sounds: Normal breath sounds. No wheezing or rales.   Abdominal:      General: Bowel sounds are normal. There is no distension.      Palpations: Abdomen is soft.      Tenderness: There is abdominal tenderness.   Musculoskeletal:         General: No tenderness. Normal range of motion.   Skin:     General: Skin is warm and dry.      Coloration: Skin is not pale.      Findings: No erythema or rash.   Neurological:      Mental Status: He is alert and oriented to person, place, and time.               Significant Labs: All pertinent labs within the past 24 hours have been reviewed.    Significant Imaging: I have reviewed all pertinent imaging results/findings within the  past 24 hours.      Assessment & Plan  Acute on chronic pancreatitis  Continue intravenous fluids, pain medication, supportive care.  Denies recent alcohol use.  Check PETH.  Pancreatitis not felt to be related to his anti-retroviral therapy.  HIV infection  Continue anti-retroviral therapy  Polycythemia vera  Stable  Splenic vein thrombosis  Not not currently on anticoagulation  VTE Risk Mitigation (From admission, onward)           Ordered     enoxaparin injection 40 mg  Daily         04/04/25 0544     IP VTE HIGH RISK PATIENT  Once         04/04/25 0544     Place sequential compression device  Until discontinued         04/04/25 0544                  Allan Jones MD  Department of Hospital Medicine   [facility] ([address])

## 2025-04-05 NOTE — SUBJECTIVE & OBJECTIVE
Interval History: Pain better.  Tolerating oral intake.    Review of Systems   Constitutional:  Negative for chills and fever.   Respiratory:  Negative for shortness of breath.    Cardiovascular:  Negative for chest pain.   Gastrointestinal:  Positive for abdominal pain. Negative for constipation, diarrhea, nausea and vomiting.     Objective:     Vital Signs (Most Recent):  Temp: 98.4 °F (36.9 °C) (04/05/25 1135)  Pulse: 75 (04/05/25 1135)  Resp: 17 (04/05/25 1405)  BP: 133/86 (04/05/25 1405)  SpO2: 97 % (04/05/25 1135) Vital Signs (24h Range):  Temp:  [97 °F (36.1 °C)-98.7 °F (37.1 °C)] 98.4 °F (36.9 °C)  Pulse:  [64-80] 75  Resp:  [16-20] 17  SpO2:  [96 %-100 %] 97 %  BP: (115-135)/(70-93) 133/86     Weight: 80.2 kg (176 lb 12.9 oz)  Body mass index is 25.37 kg/m².    Intake/Output Summary (Last 24 hours) at 4/5/2025 1441  Last data filed at 4/4/2025 1759  Gross per 24 hour   Intake 240 ml   Output 0 ml   Net 240 ml         Physical Exam  Cardiovascular:      Rate and Rhythm: Normal rate and regular rhythm.      Heart sounds: Normal heart sounds. No murmur heard.     No friction rub. No gallop.   Pulmonary:      Effort: Pulmonary effort is normal. No respiratory distress.      Breath sounds: Normal breath sounds. No wheezing or rales.   Abdominal:      General: Bowel sounds are normal. There is no distension.      Palpations: Abdomen is soft.      Tenderness: There is abdominal tenderness.   Musculoskeletal:         General: No tenderness. Normal range of motion.   Skin:     General: Skin is warm and dry.      Coloration: Skin is not pale.      Findings: No erythema or rash.   Neurological:      Mental Status: He is alert and oriented to person, place, and time.               Significant Labs: All pertinent labs within the past 24 hours have been reviewed.    Significant Imaging: I have reviewed all pertinent imaging results/findings within the past 24 hours.

## 2025-04-05 NOTE — PLAN OF CARE
04/05/25 1109   Readmission   Was this a planned readmission? No   Why were you hospitalized in the last 30 days? Chronic pancreatits   Why were you readmitted? Alarmed about signs/symptoms   When you left the hospital how did you feel? A little better   When you left the hospital where did you go? Home Alone   Did patient/caregiver refused recommended DC plan? Yes   Tell me about what happened between when you left the hospital and the day you returned. Severe abdominal pain   When did you start not feeling well? 2 days ago   Did you try to manage your symptoms your self? Yes   What did you do? Take mediciation   Did you call anyone? No   Did you try to see or did see a doctor or nurse before you came? No   Why? I knew what was wrong   Did you have  a follow-up appointment on discharge? Yes   Did you go? No   Why? Not feeling well

## 2025-04-05 NOTE — PLAN OF CARE
Case Management Assessment     PCP: Dr. Pina Jones  Pharmacy: Bedside    Patient Arrived From: Home  Existing Help at Home: None    Barriers to Discharge: Transportation    Discharge Plan:    A. Home   B. Home    Sw met patient at bedside. Patient is alert and oriented with no communication barriers. Patient lives at home alone. Patient denies any dme. Patient is not on coumadin or dialysis. Patient will need a ride home at discharge. CM will continue to follow.              04/05/25 1058   Discharge Assessment   Assessment Type Discharge Planning Assessment   Confirmed/corrected address, phone number and insurance Yes   Confirmed Demographics Correct on Facesheet   Source of Information patient   Does patient/caregiver understand observation status Yes   Reason For Admission Chronic pancreatits   People in Home alone   Facility Arrived From: Home   Do you expect to return to your current living situation? Yes   Do you have help at home or someone to help you manage your care at home? No   Prior to hospitilization cognitive status: Alert/Oriented   Current cognitive status: Alert/Oriented   Walking or Climbing Stairs Difficulty no   Dressing/Bathing Difficulty no   Equipment Currently Used at Home none   Readmission within 30 days? Yes   Patient currently being followed by outpatient case management? No   Do you currently have service(s) that help you manage your care at home? No   Do you take prescription medications? Yes   Do you have prescription coverage? Yes   Coverage A[employer] Medicaid   Do you have any problems affording any of your prescribed medications? No   Who is going to help you get home at discharge? Patient will need a ride home   How do you get to doctors appointments? car, drives self   Are you on dialysis? No   Do you take coumadin? No   Discharge Plan A Home   Discharge Plan B Home   DME Needed Upon Discharge  none   Transition of Care Barriers None     [facility] ([address] [address]  [facility])  Initial Discharge Assessment       Primary Care Provider: Pina Jones NP    Admission Diagnosis: Vomiting [R11.10]  Chest pain [R07.9]  Other chronic pancreatitis [K86.1]  HIV infection, unspecified symptom status [Z21]    Admission Date: 4/4/2025  Expected Discharge Date:     Transition of Care Barriers: (P) None    Payor: MEDICAID / Plan: [employer] [facility] / Product Type: Managed Medicaid /     Extended Emergency Contact Information  Primary Emergency Contact: Denice Cardona  Mobile Phone: [phone]  Relation: Mother  Preferred language: English   needed? No    Discharge Plan A: (P) Home  Discharge Plan B: (P) Home      [id number] - [place], [place] - [address] [facility] [place] [facility] [place]  [address]  [facility] [address]  Phone: [phone] Fax: [phone]    [facility] SPECIALTY [facility] [place] - [address]  [address] [facility] [address]  Phone: [phone] Fax: [phone]    [facility]  [address] [facility] [address]  Phone: [phone] Fax: [phone]      Initial Assessment (most recent)       Adult Discharge Assessment - 04/05/25 1058          Discharge Assessment    Assessment Type Discharge Planning Assessment (P)      Confirmed/corrected address, phone number and insurance Yes (P)      Confirmed Demographics Correct on Facesheet (P)      Source of Information patient (P)      Does patient/caregiver understand observation status Yes (P)      Reason For Admission Chronic pancreatits (P)      People in Home alone (P)      Facility Arrived From: Home (P)      Do you expect to return to your current living situation? Yes (P)      Do you have help at home or someone to help you manage your care at home? No (P)      Prior to hospitilization cognitive status: Alert/Oriented (P)      Current cognitive status: Alert/Oriented (P)      Walking or Climbing  Stairs Difficulty no (P)      Dressing/Bathing Difficulty no (P)      Equipment Currently Used at Home none (P)      Readmission within 30 days? Yes (P)      Patient currently being followed by outpatient case management? No (P)      Do you currently have service(s) that help you manage your care at home? No (P)      Do you take prescription medications? Yes (P)      Do you have prescription coverage? Yes (P)      Coverage A[employer]na Medicaid (P)      Do you have any problems affording any of your prescribed medications? No (P)      Who is going to help you get home at discharge? Patient will need a ride home (P)      How do you get to doctors appointments? car, drives self (P)      Are you on dialysis? No (P)      Do you take coumadin? No (P)      Discharge Plan A Home (P)      Discharge Plan B Home (P)      DME Needed Upon Discharge  none (P)      Transition of Care Barriers None (P)

## 2025-04-06 LAB
ABSOLUTE EOSINOPHIL (OHS): 0.11 K/UL
ABSOLUTE MONOCYTE (OHS): 0.52 K/UL (ref 0.3–1)
ABSOLUTE NEUTROPHIL COUNT (OHS): 1.14 K/UL (ref 1.8–7.7)
ALBUMIN SERPL BCP-MCNC: 3.2 G/DL (ref 3.5–5.2)
ALP SERPL-CCNC: 86 UNIT/L (ref 40–150)
ALT SERPL W/O P-5'-P-CCNC: 73 UNIT/L (ref 10–44)
ANION GAP (OHS): 8 MMOL/L (ref 8–16)
AST SERPL-CCNC: 46 UNIT/L (ref 11–45)
BASOPHILS # BLD AUTO: 0.03 K/UL
BASOPHILS NFR BLD AUTO: 0.8 %
BILIRUB SERPL-MCNC: 0.3 MG/DL (ref 0.1–1)
BUN SERPL-MCNC: 8 MG/DL (ref 6–20)
CALCIUM SERPL-MCNC: 8.4 MG/DL (ref 8.7–10.5)
CHLORIDE SERPL-SCNC: 107 MMOL/L (ref 95–110)
CO2 SERPL-SCNC: 21 MMOL/L (ref 23–29)
CREAT SERPL-MCNC: 0.7 MG/DL (ref 0.5–1.4)
ERYTHROCYTE [DISTWIDTH] IN BLOOD BY AUTOMATED COUNT: 18 % (ref 11.5–14.5)
GFR SERPLBLD CREATININE-BSD FMLA CKD-EPI: >60 ML/MIN/1.73/M2
GLUCOSE SERPL-MCNC: 110 MG/DL (ref 70–110)
HCT VFR BLD AUTO: 35.6 % (ref 40–54)
HGB BLD-MCNC: 10.3 GM/DL (ref 14–18)
IMM GRANULOCYTES # BLD AUTO: 0 K/UL (ref 0–0.04)
IMM GRANULOCYTES NFR BLD AUTO: 0 % (ref 0–0.5)
LYMPHOCYTES # BLD AUTO: 1.95 K/UL (ref 1–4.8)
MAGNESIUM SERPL-MCNC: 1.8 MG/DL (ref 1.6–2.6)
MCH RBC QN AUTO: 23.8 PG (ref 27–31)
MCHC RBC AUTO-ENTMCNC: 28.9 G/DL (ref 32–36)
MCV RBC AUTO: 82 FL (ref 82–98)
NUCLEATED RBC (/100WBC) (OHS): 0 /100 WBC
PHOSPHATE SERPL-MCNC: 5.3 MG/DL (ref 2.7–4.5)
PLATELET # BLD AUTO: 178 K/UL (ref 150–450)
PMV BLD AUTO: 10.6 FL (ref 9.2–12.9)
POTASSIUM SERPL-SCNC: 4.1 MMOL/L (ref 3.5–5.1)
PROT SERPL-MCNC: 6.4 GM/DL (ref 6–8.4)
RBC # BLD AUTO: 4.33 M/UL (ref 4.6–6.2)
RELATIVE EOSINOPHIL (OHS): 2.9 %
RELATIVE LYMPHOCYTE (OHS): 52 % (ref 18–48)
RELATIVE MONOCYTE (OHS): 13.9 % (ref 4–15)
RELATIVE NEUTROPHIL (OHS): 30.4 % (ref 38–73)
SODIUM SERPL-SCNC: 136 MMOL/L (ref 136–145)
WBC # BLD AUTO: 3.75 K/UL (ref 3.9–12.7)

## 2025-04-06 PROCEDURE — 80053 COMPREHEN METABOLIC PANEL: CPT

## 2025-04-06 PROCEDURE — 83735 ASSAY OF MAGNESIUM: CPT

## 2025-04-06 PROCEDURE — 84100 ASSAY OF PHOSPHORUS: CPT

## 2025-04-06 PROCEDURE — 80321 ALCOHOLS BIOMARKERS 1OR 2: CPT | Performed by: HOSPITALIST

## 2025-04-06 PROCEDURE — 11000001 HC ACUTE MED/SURG PRIVATE ROOM

## 2025-04-06 PROCEDURE — 63600175 PHARM REV CODE 636 W HCPCS: Performed by: NURSE PRACTITIONER

## 2025-04-06 PROCEDURE — 94761 N-INVAS EAR/PLS OXIMETRY MLT: CPT

## 2025-04-06 PROCEDURE — 85025 COMPLETE CBC W/AUTO DIFF WBC: CPT

## 2025-04-06 PROCEDURE — 25000003 PHARM REV CODE 250: Performed by: HOSPITALIST

## 2025-04-06 PROCEDURE — 25000003 PHARM REV CODE 250

## 2025-04-06 PROCEDURE — 63600175 PHARM REV CODE 636 W HCPCS

## 2025-04-06 PROCEDURE — 36415 COLL VENOUS BLD VENIPUNCTURE: CPT | Performed by: HOSPITALIST

## 2025-04-06 RX ADMIN — HYDROMORPHONE HYDROCHLORIDE 2 MG: 2 INJECTION INTRAMUSCULAR; INTRAVENOUS; SUBCUTANEOUS at 02:04

## 2025-04-06 RX ADMIN — BICTEGRAVIR SODIUM, EMTRICITABINE, AND TENOFOVIR ALAFENAMIDE FUMARATE 1 TABLET: 50; 200; 25 TABLET ORAL at 08:04

## 2025-04-06 RX ADMIN — HYDROMORPHONE HYDROCHLORIDE 2 MG: 2 INJECTION INTRAMUSCULAR; INTRAVENOUS; SUBCUTANEOUS at 10:04

## 2025-04-06 RX ADMIN — SODIUM CHLORIDE: 9 INJECTION, SOLUTION INTRAVENOUS at 10:04

## 2025-04-06 RX ADMIN — ONDANSETRON 8 MG: 2 INJECTION INTRAMUSCULAR; INTRAVENOUS at 10:04

## 2025-04-06 RX ADMIN — SENNOSIDES AND DOCUSATE SODIUM 1 TABLET: 50; 8.6 TABLET ORAL at 08:04

## 2025-04-06 RX ADMIN — HYDROMORPHONE HYDROCHLORIDE 2 MG: 2 INJECTION INTRAMUSCULAR; INTRAVENOUS; SUBCUTANEOUS at 06:04

## 2025-04-06 RX ADMIN — SENNOSIDES AND DOCUSATE SODIUM 1 TABLET: 50; 8.6 TABLET ORAL at 10:04

## 2025-04-06 RX ADMIN — MELATONIN TAB 3 MG 6 MG: 3 TAB at 10:04

## 2025-04-06 RX ADMIN — ONDANSETRON 8 MG: 2 INJECTION INTRAMUSCULAR; INTRAVENOUS at 12:04

## 2025-04-06 RX ADMIN — SODIUM CHLORIDE: 9 INJECTION, SOLUTION INTRAVENOUS at 02:04

## 2025-04-06 NOTE — SUBJECTIVE & OBJECTIVE
Interval History:  Reports worsening pain and nausea after oral intake today.    Review of Systems   Constitutional:  Negative for chills and fever.   Respiratory:  Negative for shortness of breath.    Cardiovascular:  Negative for chest pain.   Gastrointestinal:  Positive for abdominal pain and nausea. Negative for constipation, diarrhea and vomiting.     Objective:     Vital Signs (Most Recent):  Temp: 98 °F (36.7 °C) (04/06/25 0722)  Pulse: 72 (04/06/25 0722)  Resp: 18 (04/06/25 0722)  BP: 121/84 (04/06/25 0722)  SpO2: 98 % (04/06/25 0722) Vital Signs (24h Range):  Temp:  [97.8 °F (36.6 °C)-98.7 °F (37.1 °C)] 98 °F (36.7 °C)  Pulse:  [68-85] 72  Resp:  [16-18] 18  SpO2:  [97 %-98 %] 98 %  BP: (119-140)/(74-88) 121/84     Weight: 80.2 kg (176 lb 12.9 oz)  Body mass index is 25.37 kg/m².    Intake/Output Summary (Last 24 hours) at 4/6/2025 1038  Last data filed at 4/6/2025 0811  Gross per 24 hour   Intake 480 ml   Output 250 ml   Net 230 ml         Physical Exam  Cardiovascular:      Rate and Rhythm: Normal rate and regular rhythm.      Heart sounds: Normal heart sounds. No murmur heard.     No friction rub. No gallop.   Pulmonary:      Effort: Pulmonary effort is normal. No respiratory distress.      Breath sounds: Normal breath sounds. No wheezing or rales.   Abdominal:      General: Bowel sounds are normal. There is no distension.      Palpations: Abdomen is soft.      Tenderness: There is abdominal tenderness.   Musculoskeletal:         General: No tenderness. Normal range of motion.   Skin:     General: Skin is warm and dry.      Coloration: Skin is not pale.      Findings: No erythema or rash.   Neurological:      Mental Status: He is alert and oriented to person, place, and time.               Significant Labs: All pertinent labs within the past 24 hours have been reviewed.    Significant Imaging: I have reviewed all pertinent imaging results/findings within the past 24 hours.

## 2025-04-06 NOTE — PROGRESS NOTES
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 He notes uncontrolled pain currently rated 9/10 severity that was difficult to control with home meds. He feels that current symptoms have been similar to his previous episodes of pancreatitis and denies any change in quality or characteristics in regards to pain. Denies fever/chills, difficulty urinating, dysuria, chest pain, SOB. Of note, patient has multiple recent and past admissions/ED visits with similar presentation.      In the ED: Patient remained hemodynamically stable and afebrile. Hemoglobin decreased to 12.8. Hematocrit, WBC, and platelets WNL. CMP without electrolyte derangements. AST/ALT elevated to 140/148, which is increased from 61/68 on discharge from [facility] on 04/01. Patient Hep C reactive. Chest x-ray without acute cardiopulmonary process. Lipase 30 from 167 on prior 5 days ago. Most recent CT AP 3/29 showed increasing fluid collection concerning for developing pseudocyst. Admitted to hospital medicine for further management."    Overview/Hospital Course:  Patient is a 24-year-old man with polycythemia vera, HIV on anti-retroviral therapy, chronic pancreatitis admitted to [facility] with the acute on chronic pancreatitis.  Patient " transferred to [facility] for ongoing care do capacity issues.    Interval History:  Reports worsening pain and nausea after oral intake today.    Review of Systems   Constitutional:  Negative for chills and fever.   Respiratory:  Negative for shortness of breath.    Cardiovascular:  Negative for chest pain.   Gastrointestinal:  Positive for abdominal pain and nausea. Negative for constipation, diarrhea and vomiting.     Objective:     Vital Signs (Most Recent):  Temp: 98 °F (36.7 °C) (04/06/25 0722)  Pulse: 72 (04/06/25 0722)  Resp: 18 (04/06/25 0722)  BP: 121/84 (04/06/25 0722)  SpO2: 98 % (04/06/25 0722) Vital Signs (24h Range):  Temp:  [97.8 °F (36.6 °C)-98.7 °F (37.1 °C)] 98 °F (36.7 °C)  Pulse:  [68-85] 72  Resp:  [16-18] 18  SpO2:  [97 %-98 %] 98 %  BP: (119-140)/(74-88) 121/84     Weight: 80.2 kg (176 lb 12.9 oz)  Body mass index is 25.37 kg/m².    Intake/Output Summary (Last 24 hours) at 4/6/2025 1038  Last data filed at 4/6/2025 0811  Gross per 24 hour   Intake 480 ml   Output 250 ml   Net 230 ml         Physical Exam  Cardiovascular:      Rate and Rhythm: Normal rate and regular rhythm.      Heart sounds: Normal heart sounds. No murmur heard.     No friction rub. No gallop.   Pulmonary:      Effort: Pulmonary effort is normal. No respiratory distress.      Breath sounds: Normal breath sounds. No wheezing or rales.   Abdominal:      General: Bowel sounds are normal. There is no distension.      Palpations: Abdomen is soft.      Tenderness: There is abdominal tenderness.   Musculoskeletal:         General: No tenderness. Normal range of motion.   Skin:     General: Skin is warm and dry.      Coloration: Skin is not pale.      Findings: No erythema or rash.   Neurological:      Mental Status: He is alert and oriented to person, place, and time.               Significant Labs: All pertinent labs within the past 24 hours have been reviewed.    Significant Imaging: I have reviewed all pertinent imaging  results/findings within the past 24 hours.      Assessment & Plan  Acute on chronic pancreatitis  Fluid collection of pancreas  Continue intravenous fluids, pain medication, supportive care.  Denies recent alcohol use.  Checking PETH.  Pending.  Pancreatitis not felt to be related to his anti-retroviral therapy.  HIV infection  Continue anti-retroviral therapy  Polycythemia vera  Stable  Splenic vein thrombosis  Not not currently on anticoagulation  Hepatitis C antibody detected  Hepatitis C antibody reactive on admission.  Viral load negative.  VTE Risk Mitigation (From admission, onward)           Ordered     enoxaparin injection 40 mg  Daily         04/04/25 0544     IP VTE HIGH RISK PATIENT  Once         04/04/25 0544     Place sequential compression device  Until discontinued         04/04/25 0544                    Allan Jones MD  Department of Hospital Medicine   [facility] - Med Surg ([address])

## 2025-04-06 NOTE — ASSESSMENT & PLAN NOTE
Continue intravenous fluids, pain medication, supportive care.  Denies recent alcohol use.  Checking PETH.  Pending.  Pancreatitis not felt to be related to his anti-retroviral therapy.

## 2025-04-07 ENCOUNTER — TELEPHONE (OUTPATIENT)
Dept: GASTROENTEROLOGY | Facility: CLINIC | Age: 25
End: 2025-04-07
Payer: MEDICAID

## 2025-04-07 VITALS
HEIGHT: 70 IN | RESPIRATION RATE: 16 BRPM | SYSTOLIC BLOOD PRESSURE: 123 MMHG | BODY MASS INDEX: 25.31 KG/M2 | WEIGHT: 176.81 LBS | DIASTOLIC BLOOD PRESSURE: 78 MMHG | TEMPERATURE: 98 F | HEART RATE: 74 BPM | OXYGEN SATURATION: 97 %

## 2025-04-07 LAB
ABSOLUTE EOSINOPHIL (OHS): 0.15 K/UL
ABSOLUTE MONOCYTE (OHS): 0.58 K/UL (ref 0.3–1)
ABSOLUTE NEUTROPHIL COUNT (OHS): 1.39 K/UL (ref 1.8–7.7)
ALBUMIN SERPL BCP-MCNC: 3.4 G/DL (ref 3.5–5.2)
ALP SERPL-CCNC: 82 UNIT/L (ref 40–150)
ALT SERPL W/O P-5'-P-CCNC: 70 UNIT/L (ref 10–44)
ANION GAP (OHS): 9 MMOL/L (ref 8–16)
AST SERPL-CCNC: 42 UNIT/L (ref 11–45)
BASOPHILS # BLD AUTO: 0.02 K/UL
BASOPHILS NFR BLD AUTO: 0.5 %
BILIRUB SERPL-MCNC: 0.3 MG/DL (ref 0.1–1)
BUN SERPL-MCNC: 13 MG/DL (ref 6–20)
CALCIUM SERPL-MCNC: 8.7 MG/DL (ref 8.7–10.5)
CHLORIDE SERPL-SCNC: 107 MMOL/L (ref 95–110)
CO2 SERPL-SCNC: 21 MMOL/L (ref 23–29)
CREAT SERPL-MCNC: 0.8 MG/DL (ref 0.5–1.4)
ERYTHROCYTE [DISTWIDTH] IN BLOOD BY AUTOMATED COUNT: 17.9 % (ref 11.5–14.5)
GFR SERPLBLD CREATININE-BSD FMLA CKD-EPI: >60 ML/MIN/1.73/M2
GLUCOSE SERPL-MCNC: 119 MG/DL (ref 70–110)
HCT VFR BLD AUTO: 35.9 % (ref 40–54)
HGB BLD-MCNC: 10.6 GM/DL (ref 14–18)
IMM GRANULOCYTES # BLD AUTO: 0.01 K/UL (ref 0–0.04)
IMM GRANULOCYTES NFR BLD AUTO: 0.2 % (ref 0–0.5)
LYMPHOCYTES # BLD AUTO: 2.03 K/UL (ref 1–4.8)
MAGNESIUM SERPL-MCNC: 2 MG/DL (ref 1.6–2.6)
MCH RBC QN AUTO: 24 PG (ref 27–31)
MCHC RBC AUTO-ENTMCNC: 29.5 G/DL (ref 32–36)
MCV RBC AUTO: 81 FL (ref 82–98)
NUCLEATED RBC (/100WBC) (OHS): 0 /100 WBC
PHOSPHATE SERPL-MCNC: 5.4 MG/DL (ref 2.7–4.5)
PLATELET # BLD AUTO: 195 K/UL (ref 150–450)
PMV BLD AUTO: 10.4 FL (ref 9.2–12.9)
POTASSIUM SERPL-SCNC: 4.2 MMOL/L (ref 3.5–5.1)
PROT SERPL-MCNC: 6.7 GM/DL (ref 6–8.4)
RBC # BLD AUTO: 4.41 M/UL (ref 4.6–6.2)
RELATIVE EOSINOPHIL (OHS): 3.6 %
RELATIVE LYMPHOCYTE (OHS): 48.6 % (ref 18–48)
RELATIVE MONOCYTE (OHS): 13.9 % (ref 4–15)
RELATIVE NEUTROPHIL (OHS): 33.2 % (ref 38–73)
SODIUM SERPL-SCNC: 137 MMOL/L (ref 136–145)
WBC # BLD AUTO: 4.18 K/UL (ref 3.9–12.7)

## 2025-04-07 PROCEDURE — 84100 ASSAY OF PHOSPHORUS: CPT

## 2025-04-07 PROCEDURE — 36415 COLL VENOUS BLD VENIPUNCTURE: CPT

## 2025-04-07 PROCEDURE — 25000003 PHARM REV CODE 250

## 2025-04-07 PROCEDURE — 63600175 PHARM REV CODE 636 W HCPCS: Performed by: NURSE PRACTITIONER

## 2025-04-07 PROCEDURE — 85025 COMPLETE CBC W/AUTO DIFF WBC: CPT

## 2025-04-07 PROCEDURE — 82040 ASSAY OF SERUM ALBUMIN: CPT

## 2025-04-07 PROCEDURE — 25000003 PHARM REV CODE 250: Performed by: HOSPITALIST

## 2025-04-07 PROCEDURE — 83735 ASSAY OF MAGNESIUM: CPT

## 2025-04-07 RX ORDER — OXYCODONE HYDROCHLORIDE 5 MG/1
5 TABLET ORAL EVERY 8 HOURS PRN
Qty: 9 TABLET | Refills: 0 | Status: SHIPPED | OUTPATIENT
Start: 2025-04-07 | End: 2025-04-10

## 2025-04-07 RX ORDER — ACETAMINOPHEN 500 MG
1000 TABLET ORAL EVERY 8 HOURS PRN
COMMUNITY
Start: 2025-04-07

## 2025-04-07 RX ORDER — PROMETHAZINE HYDROCHLORIDE 25 MG/1
25 TABLET ORAL EVERY 6 HOURS PRN
Qty: 28 TABLET | Refills: 0 | Status: SHIPPED | OUTPATIENT
Start: 2025-04-07 | End: 2025-04-07

## 2025-04-07 RX ORDER — OXYCODONE HYDROCHLORIDE 5 MG/1
5 TABLET ORAL EVERY 8 HOURS PRN
Qty: 9 TABLET | Refills: 0 | Status: SHIPPED | OUTPATIENT
Start: 2025-04-07 | End: 2025-04-07

## 2025-04-07 RX ORDER — PROMETHAZINE HYDROCHLORIDE 25 MG/1
25 TABLET ORAL EVERY 6 HOURS PRN
Qty: 28 TABLET | Refills: 0 | Status: SHIPPED | OUTPATIENT
Start: 2025-04-07 | End: 2025-04-14

## 2025-04-07 RX ADMIN — HYDROMORPHONE HYDROCHLORIDE 2 MG: 2 INJECTION INTRAMUSCULAR; INTRAVENOUS; SUBCUTANEOUS at 02:04

## 2025-04-07 RX ADMIN — BICTEGRAVIR SODIUM, EMTRICITABINE, AND TENOFOVIR ALAFENAMIDE FUMARATE 1 TABLET: 50; 200; 25 TABLET ORAL at 08:04

## 2025-04-07 RX ADMIN — HYDROMORPHONE HYDROCHLORIDE 2 MG: 2 INJECTION INTRAMUSCULAR; INTRAVENOUS; SUBCUTANEOUS at 06:04

## 2025-04-07 RX ADMIN — SODIUM CHLORIDE: 9 INJECTION, SOLUTION INTRAVENOUS at 03:04

## 2025-04-07 RX ADMIN — SENNOSIDES AND DOCUSATE SODIUM 1 TABLET: 50; 8.6 TABLET ORAL at 08:04

## 2025-04-07 RX ADMIN — HYDROMORPHONE HYDROCHLORIDE 2 MG: 2 INJECTION INTRAMUSCULAR; INTRAVENOUS; SUBCUTANEOUS at 03:04

## 2025-04-07 RX ADMIN — HYDROMORPHONE HYDROCHLORIDE 2 MG: 2 INJECTION INTRAMUSCULAR; INTRAVENOUS; SUBCUTANEOUS at 11:04

## 2025-04-07 RX ADMIN — SODIUM CHLORIDE: 9 INJECTION, SOLUTION INTRAVENOUS at 11:04

## 2025-04-07 RX ADMIN — LIDOCAINE 1 PATCH: 50 PATCH CUTANEOUS at 05:04

## 2025-04-07 NOTE — PROGRESS NOTES
04/07/25 0918   Discharge Delays   Discharge Delays (!) Medication Delivery  (Will discharge if pt tolerates lunch and receive meds from outpt pharm)

## 2025-04-07 NOTE — TELEPHONE ENCOUNTER
Copied from CRM #[id number]. Topic: General Inquiry - Patient Advice  >> 2025  9:33 AM Milagros wrote:  Pt is requesting a call from someone in the office and is asking for a return call back soon. Thanks.     Reason for call:scheduled a hospital follow up      Patient's DX:     Patient requesting call back or [facility] [phone]

## 2025-04-07 NOTE — PLAN OF CARE
Case Management Final Discharge Note    Discharge Disposition: Home    New DME ordered / company name: None    Relevant SDOH / Transition of Care Barriers:  None    Person available to provide assistance at home when needed and their contact information: Independent    Scheduled followup appointment: PCP, Gastro on AVS    Referrals placed: Gastroenterology    Transportation: Patient using [employer] at discharge    Patient educated on discharge services and updated on DC plan. Bedside RN notified, patient clear to discharge from Case Management Perspective.     [facility] ([address])  Discharge Final Note    Primary Care Provider: Pina Jones NP    Expected Discharge Date: 4/7/2025    Final Discharge Note (most recent)       Final Note - 04/07/25 0937          Final Note    Assessment Type Final Discharge Note (P)      Anticipated Discharge Disposition Home or Self Care (P)      Hospital Resources/Appts/Education Provided Provided patient/caregiver with written discharge plan information;Appointments scheduled and added to AVS (P)         Post-Acute Status    Discharge Delays None known at this time (P)                      Important Message from Medicare             Contact Info       Faustino Trujillo MD   Specialty: Gastroenterology    [address] [facility] [address]   Phone: [phone]       Next Steps: Schedule an appointment as soon as possible for a visit in 1 week(s)    Instructions: MD office will call to schedule an appointment    [facility]   Specialty: Behavioral Health, Psychiatry, Family Medicine    [address] [facility] [address]   Phone: [phone]       Next Steps: Follow up on 4/15/2025    Instructions: Please arrive at 1:30 PM with ID, medications, and dishcarge paperwork.

## 2025-04-07 NOTE — PLAN OF CARE
Patient is currently being treated with fluids and reported pain is being managed with pain medications. Clam and rested with call light in reach .    Problem: Adult Inpatient Plan of Care  Goal: Plan of Care Review  Outcome: Progressing  Goal: Patient-Specific Goal (Individualized)  Outcome: Progressing  Goal: Absence of Hospital-Acquired Illness or Injury  Outcome: Progressing  Goal: Optimal Comfort and Wellbeing  Outcome: Progressing  Goal: Readiness for Transition of Care  Outcome: Progressing     Problem: Fall Injury Risk  Goal: Absence of Fall and Fall-Related Injury  Outcome: Progressing

## 2025-04-07 NOTE — PLAN OF CARE
Pt lying in bed. Assessment completed; no pain or distress reported. Call light within reach and bed lowered.     1510  In agreement and eager for DC.  VU of DC instructions, paperwork and prescriptions passed. IV removed with cath tip intact, WNL.  To be DC home with family.  Will be escorted downstairs via  transport team once dressed and ready.   Free from falls or skin breakdown this hospital admission.

## 2025-04-08 ENCOUNTER — RESULTS FOLLOW-UP (OUTPATIENT)
Dept: EMERGENCY MEDICINE | Facility: HOSPITAL | Age: 25
End: 2025-04-08

## 2025-04-09 LAB
PLPETH BLD-MCNC: 615 NG/ML
POPETH BLD-MCNC: 907 NG/ML
TOXICOLOGIST REVIEW: NORMAL

## 2025-04-16 ENCOUNTER — HOSPITAL ENCOUNTER (EMERGENCY)
Facility: OTHER | Age: 25
Discharge: HOME | End: 2025-04-16
Payer: MEDICAID

## 2025-04-16 VITALS
SYSTOLIC BLOOD PRESSURE: 108 MMHG | HEIGHT: 70 IN | DIASTOLIC BLOOD PRESSURE: 68 MMHG | RESPIRATION RATE: 20 BRPM | HEART RATE: 89 BPM | TEMPERATURE: 98 F | BODY MASS INDEX: 24.34 KG/M2 | OXYGEN SATURATION: 99 % | WEIGHT: 170 LBS

## 2025-04-16 DIAGNOSIS — F10.10 ALCOHOL ABUSE: Primary | ICD-10-CM

## 2025-04-16 DIAGNOSIS — K86.0 ALCOHOL-INDUCED CHRONIC PANCREATITIS: ICD-10-CM

## 2025-04-16 LAB
ABSOLUTE EOSINOPHIL (OHS): 0.2 K/UL
ABSOLUTE MONOCYTE (OHS): 0.6 K/UL (ref 0.3–1)
ABSOLUTE NEUTROPHIL COUNT (OHS): 3.08 K/UL (ref 1.8–7.7)
ALBUMIN SERPL BCP-MCNC: 4.1 G/DL (ref 3.5–5.2)
ALP SERPL-CCNC: 108 UNIT/L (ref 40–150)
ALT SERPL W/O P-5'-P-CCNC: 109 UNIT/L (ref 10–44)
ANION GAP (OHS): 12 MMOL/L (ref 8–16)
AST SERPL-CCNC: 93 UNIT/L (ref 11–45)
BASOPHILS # BLD AUTO: 0.1 K/UL
BASOPHILS NFR BLD AUTO: 1.5 %
BILIRUB SERPL-MCNC: 0.2 MG/DL (ref 0.1–1)
BUN SERPL-MCNC: 10 MG/DL (ref 6–20)
CALCIUM SERPL-MCNC: 8.4 MG/DL (ref 8.7–10.5)
CHLORIDE SERPL-SCNC: 105 MMOL/L (ref 95–110)
CO2 SERPL-SCNC: 22 MMOL/L (ref 23–29)
CREAT SERPL-MCNC: 0.8 MG/DL (ref 0.5–1.4)
ERYTHROCYTE [DISTWIDTH] IN BLOOD BY AUTOMATED COUNT: 19.1 % (ref 11.5–14.5)
ETHANOL SERPL-MCNC: 202 MG/DL
GFR SERPLBLD CREATININE-BSD FMLA CKD-EPI: >60 ML/MIN/1.73/M2
GLUCOSE SERPL-MCNC: 113 MG/DL (ref 70–110)
HCT VFR BLD AUTO: 44.2 % (ref 40–54)
HGB BLD-MCNC: 13.8 GM/DL (ref 14–18)
IMM GRANULOCYTES # BLD AUTO: 0.01 K/UL (ref 0–0.04)
IMM GRANULOCYTES NFR BLD AUTO: 0.2 % (ref 0–0.5)
LIPASE SERPL-CCNC: 23 U/L (ref 4–60)
LYMPHOCYTES # BLD AUTO: 2.48 K/UL (ref 1–4.8)
MCH RBC QN AUTO: 24.6 PG (ref 27–31)
MCHC RBC AUTO-ENTMCNC: 31.2 G/DL (ref 32–36)
MCV RBC AUTO: 79 FL (ref 82–98)
NUCLEATED RBC (/100WBC) (OHS): 0 /100 WBC
PLATELET # BLD AUTO: 402 K/UL (ref 150–450)
PMV BLD AUTO: 9.1 FL (ref 9.2–12.9)
POTASSIUM SERPL-SCNC: 3.7 MMOL/L (ref 3.5–5.1)
PROT SERPL-MCNC: 8.3 GM/DL (ref 6–8.4)
RBC # BLD AUTO: 5.62 M/UL (ref 4.6–6.2)
RELATIVE EOSINOPHIL (OHS): 3.1 %
RELATIVE LYMPHOCYTE (OHS): 38.3 % (ref 18–48)
RELATIVE MONOCYTE (OHS): 9.3 % (ref 4–15)
RELATIVE NEUTROPHIL (OHS): 47.6 % (ref 38–73)
SODIUM SERPL-SCNC: 139 MMOL/L (ref 136–145)
WBC # BLD AUTO: 6.47 K/UL (ref 3.9–12.7)

## 2025-04-16 PROCEDURE — 96376 TX/PRO/DX INJ SAME DRUG ADON: CPT

## 2025-04-16 PROCEDURE — 83690 ASSAY OF LIPASE: CPT

## 2025-04-16 PROCEDURE — 85025 COMPLETE CBC W/AUTO DIFF WBC: CPT

## 2025-04-16 PROCEDURE — 96366 THER/PROPH/DIAG IV INF ADDON: CPT

## 2025-04-16 PROCEDURE — 96365 THER/PROPH/DIAG IV INF INIT: CPT

## 2025-04-16 PROCEDURE — 63600175 PHARM REV CODE 636 W HCPCS

## 2025-04-16 PROCEDURE — 82077 ASSAY SPEC XCP UR&BREATH IA: CPT

## 2025-04-16 PROCEDURE — 96361 HYDRATE IV INFUSION ADD-ON: CPT

## 2025-04-16 PROCEDURE — 80053 COMPREHEN METABOLIC PANEL: CPT

## 2025-04-16 PROCEDURE — 25000003 PHARM REV CODE 250

## 2025-04-16 PROCEDURE — 99284 EMERGENCY DEPT VISIT MOD MDM: CPT | Mod: 25

## 2025-04-16 PROCEDURE — 96375 TX/PRO/DX INJ NEW DRUG ADDON: CPT

## 2025-04-16 RX ORDER — PROMETHAZINE HYDROCHLORIDE 12.5 MG/1
12.5 TABLET ORAL 4 TIMES DAILY
Status: ON HOLD | COMMUNITY
End: 2025-04-19 | Stop reason: HOSPADM

## 2025-04-16 RX ORDER — PANTOPRAZOLE SODIUM 40 MG/10ML
40 INJECTION, POWDER, LYOPHILIZED, FOR SOLUTION INTRAVENOUS
Status: COMPLETED | OUTPATIENT
Start: 2025-04-16 | End: 2025-04-16

## 2025-04-16 RX ORDER — HYDROMORPHONE HYDROCHLORIDE 1 MG/ML
1 INJECTION, SOLUTION INTRAMUSCULAR; INTRAVENOUS; SUBCUTANEOUS
Refills: 0 | Status: COMPLETED | OUTPATIENT
Start: 2025-04-16 | End: 2025-04-16

## 2025-04-16 RX ADMIN — PROMETHAZINE HYDROCHLORIDE 12.5 MG: 25 INJECTION INTRAMUSCULAR; INTRAVENOUS at 02:04

## 2025-04-16 RX ADMIN — PANTOPRAZOLE SODIUM 40 MG: 40 INJECTION, POWDER, FOR SOLUTION INTRAVENOUS at 02:04

## 2025-04-16 RX ADMIN — HYDROMORPHONE HYDROCHLORIDE 1 MG: 1 INJECTION, SOLUTION INTRAMUSCULAR; INTRAVENOUS; SUBCUTANEOUS at 02:04

## 2025-04-16 RX ADMIN — SODIUM CHLORIDE 1000 ML: 9 INJECTION, SOLUTION INTRAVENOUS at 03:04

## 2025-04-16 RX ADMIN — PROMETHAZINE HYDROCHLORIDE 12.5 MG: 25 INJECTION INTRAMUSCULAR; INTRAVENOUS at 04:04

## 2025-04-16 RX ADMIN — HYDROMORPHONE HYDROCHLORIDE 1 MG: 1 INJECTION, SOLUTION INTRAMUSCULAR; INTRAVENOUS; SUBCUTANEOUS at 05:04

## 2025-04-16 NOTE — DISCHARGE INSTRUCTIONS
Diagnosis:  Chronic pancreatitis    Tests today showed:   Labs Reviewed   COMPREHENSIVE METABOLIC PANEL - Abnormal       Result Value    Sodium 139      Potassium 3.7      Chloride 105      CO2 22 (*)     Glucose 113 (*)     BUN 10      Creatinine 0.8      Calcium 8.4 (*)     Protein Total 8.3      Albumin 4.1      Bilirubin Total 0.2            AST 93 (*)      (*)     Anion Gap 12      eGFR >60     CBC WITH DIFFERENTIAL - Abnormal    WBC 6.47      RBC 5.62      HGB 13.8 (*)     HCT 44.2      MCV 79 (*)     MCH 24.6 (*)     MCHC 31.2 (*)     RDW 19.1 (*)     Platelet Count 402      MPV 9.1 (*)     Nucleated RBC 0      Neut % 47.6      Lymph % 38.3      Mono % 9.3      Eos % 3.1      Basophil % 1.5      Imm Grans % 0.2      Neut # 3.08      Lymph # 2.48      Mono # 0.60      Eos # 0.20      Baso # 0.10      Imm Grans # 0.01     ALCOHOL,MEDICAL (ETHANOL) - Abnormal    Alcohol, Serum 202 (*)    LIPASE - Normal    Lipase Level 23     CBC W/ AUTO DIFFERENTIAL    Narrative:     The following orders were created for panel order CBC W/ AUTO DIFFERENTIAL.  Procedure                               Abnormality         Status                     ---------                               -----------         ------                     CBC with Differential[[id number]]       Abnormal            Final result                 Please view results for these tests on the individual orders.   URINALYSIS, REFLEX TO URINE CULTURE     No orders to display       Treatments you had today:   Medications   HYDROmorphone injection 1 mg (has no administration in time range)   promethazine (PHENERGAN) 12.5 mg in 0.9% NaCl 50 mL IVPB (has no administration in time range)   HYDROmorphone injection 1 mg (1 mg Intravenous Given 4/16/25 0247)   promethazine (PHENERGAN) 12.5 mg in 0.9% NaCl 50 mL IVPB (0 mg Intravenous Stopped 4/16/25 0308)   pantoprazole injection 40 mg (40 mg Intravenous Given 4/16/25 0248)   sodium chloride 0.9% bolus  1,000 mL 1,000 mL (1,000 mLs Intravenous New Bag 4/16/25 0315)       Follow-Up Plan:  - Follow-up with primary care doctor within 3 - 5 days  - Additional testing and/or evaluation as directed by your primary doctor    Return to the Emergency Department for symptoms including but not limited to: worsening symptoms, shortness of breath or chest pain, vomiting with inability to hold down fluids, fevers greater than 100.4°F, passing out/fainting/unconsciousness, or other concerning symptoms.

## 2025-04-16 NOTE — ED TRIAGE NOTES
Tasia Cardona, a 24 y.o. male presents to the ED w/ complaint of abd that started earlier today. Pt endorses n/v with blood. Pt denies headache. Pt is AAOX4 with no scute distress noted .    Triage note:  Chief Complaint   Patient presents with    Abdominal Pain     Pt reports abd pain and N/V since this morning pt states he has chronic pancreatitis and it is flaring up      Review of patient's allergies indicates:   Allergen Reactions    [place] Swelling    Pecan nut Swelling    Penicillins Hives     Tolerates cephalosporins    Tolerated piperacillin/tazobactam 11/2023    Sulfa (sulfonamide antibiotics) Hives    Opioids - morphine analogues Itching, Nausea And Vomiting and Rash    Tolonium chloride(toluidine blue-o) Hives, Itching and Rash    Toradol [ketorolac] Rash     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

## 2025-04-16 NOTE — ED PROVIDER NOTES
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  Patient has had 1 episode of vomiting earlier, took a nap and then had another episode of vomiting with bright red blood.  Attempted Phenergan earlier but was unable to keep it down.    The history is provided by the patient.     Review of patient's allergies indicates:   Allergen Reactions    Bradley Swelling    Pecan nut Swelling    Penicillins Hives     Tolerates cephalosporins    Tolerated piperacillin/tazobactam 11/2023    Sulfa (sulfonamide antibiotics) Hives    Opioids - morphine analogues Itching, Nausea And Vomiting and Rash    Tolonium chloride(toluidine blue-o) Hives, Itching and Rash    Toradol [ketorolac] Rash     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     Past Surgical History:   Procedure Laterality Date    ENDOSCOPIC ULTRASOUND OF UPPER GASTROINTESTINAL TRACT N/A 10/23/2023    Procedure: ULTRASOUND, UPPER GI TRACT, ENDOSCOPIC;  Surgeon: Emil Villatoro MD;  Location: [facility] ([address] [place] [address]);  Service: Endoscopy;  Laterality: N/A;    ENDOSCOPIC ULTRASOUND OF UPPER GASTROINTESTINAL  TRACT N/A 7/24/2024    Procedure: ULTRASOUND, UPPER GI TRACT, ENDOSCOPIC;  Surgeon: Faustino Trujillo MD;  Location: [facility] ENDO (2ND FLR);  Service: Endoscopy;  Laterality: N/A;    ERCP N/A 10/23/2023    Procedure: ERCP (ENDOSCOPIC RETROGRADE CHOLANGIOPANCREATOGRAPHY);  Surgeon: Emil Villatoro MD;  Location: [facility] ENDO (2ND FLR);  Service: Endoscopy;  Laterality: N/A;    ESOPHAGOGASTRODUODENOSCOPY N/A 9/18/2023    Procedure: EGD (ESOPHAGOGASTRODUODENOSCOPY);  Surgeon: Kg Cleaning MD;  Location: [facility] ENDO (2ND FLR);  Service: Endoscopy;  Laterality: N/A;    ESOPHAGOGASTRODUODENOSCOPY N/A 3/8/2024    Procedure: EGD (ESOPHAGOGASTRODUODENOSCOPY);  Surgeon: Greg Love MD;  Location: [facility] ENDO (2ND FLR);  Service: Endoscopy;  Laterality: N/A;    ESOPHAGOGASTRODUODENOSCOPY N/A 7/24/2024    Procedure: EGD (ESOPHAGOGASTRODUODENOSCOPY);  Surgeon: Faustino Trujillo MD;  Location: [facility] ENDO (2ND FLR);  Service: Endoscopy;  Laterality: N/A;     Family History   Problem Relation Name Age of Onset    Pancreatitis Mother      Cancer Mother      Ovarian cancer Mother      No Known Problems Father      Cancer Brother      Breast cancer Maternal Grandmother       Social History[1]  Review of Systems    Physical Exam     Initial Vitals [04/16/25 0030]   BP Pulse Resp Temp SpO2   126/86 99 20 98 °F (36.7 °C) 99 %      MAP       --         Physical Exam    Nursing note and vitals reviewed.  Constitutional: He is not diaphoretic. No distress.   HENT:   Head: Normocephalic and atraumatic.   Neck: Neck supple.   Normal range of motion.  Cardiovascular:  Regular rhythm.           Tachycardic   Pulmonary/Chest: Breath sounds normal. No respiratory distress. He has no wheezes. He has no rhonchi. He has no rales.   Abdominal: Abdomen is soft. He exhibits no distension. There is abdominal tenderness (Epigastric). There is no rebound and no guarding.   Musculoskeletal:         General: No edema.      Cervical back: Normal range  of motion and neck supple.     Neurological: He is alert.   Skin: Skin is warm and dry.   Psychiatric: He has a normal mood and affect. Thought content normal.         ED Course   Procedures  Labs Reviewed   COMPREHENSIVE METABOLIC PANEL - Abnormal       Result Value    Sodium 139      Potassium 3.7      Chloride 105      CO2 22 (*)     Glucose 113 (*)     BUN 10      Creatinine 0.8      Calcium 8.4 (*)     Protein Total 8.3      Albumin 4.1      Bilirubin Total 0.2            AST 93 (*)      (*)     Anion Gap 12      eGFR >60     CBC WITH DIFFERENTIAL - Abnormal    WBC 6.47      RBC 5.62      HGB 13.8 (*)     HCT 44.2      MCV 79 (*)     MCH 24.6 (*)     MCHC 31.2 (*)     RDW 19.1 (*)     Platelet Count 402      MPV 9.1 (*)     Nucleated RBC 0      Neut % 47.6      Lymph % 38.3      Mono % 9.3      Eos % 3.1      Basophil % 1.5      Imm Grans % 0.2      Neut # 3.08      Lymph # 2.48      Mono # 0.60      Eos # 0.20      Baso # 0.10      Imm Grans # 0.01     ALCOHOL,MEDICAL (ETHANOL) - Abnormal    Alcohol, Serum 202 (*)    LIPASE - Normal    Lipase Level 23     CBC W/ AUTO DIFFERENTIAL    Narrative:     The following orders were created for panel order CBC W/ AUTO DIFFERENTIAL.  Procedure                               Abnormality         Status                     ---------                               -----------         ------                     CBC with Differential[[id number]]       Abnormal            Final result                 Please view results for these tests on the individual orders.   URINALYSIS, REFLEX TO URINE CULTURE     EKG Readings: (Independently Interpreted)   Initial Reading: No STEMI. Previous EKG: Compared with most recent EKG Rhythm: Sinus Tachycardia. Heart Rate: 101. ST Segments: Normal ST Segments. T Waves: Normal.       Imaging Results    None          Medications   HYDROmorphone injection 1 mg (1 mg Intravenous Given 4/16/25 0244)   promethazine (PHENERGAN) 12.5 mg in  0.9% NaCl 50 mL IVPB (0 mg Intravenous Stopped 4/16/25 0308)   pantoprazole injection 40 mg (40 mg Intravenous Given 4/16/25 0248)   sodium chloride 0.9% bolus 1,000 mL 1,000 mL (0 mLs Intravenous Stopped 4/16/25 0415)   HYDROmorphone injection 1 mg (1 mg Intravenous Given 4/16/25 0529)   promethazine (PHENERGAN) 12.5 mg in 0.9% NaCl 50 mL IVPB (0 mg Intravenous Stopped 4/16/25 0515)     Medical Decision Making  24-year-old male with HIV on HAART, history of chronic pancreatitis with necrotizing pancreatitis, sciatic pseudocyst, polycythemia vera, 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  Labs ordered.  Patient had CT of the abdomen pelvis 2 weeks ago which showed recurrent pancreatitis, symptoms feel the same as prior, do not feel repeat study is warranted at this time.  Will treat with IV fluids, pain medications and antiemetics.  See ED course.    Amount and/or Complexity of Data Reviewed  Labs: ordered. Decision-making details documented in ED Course.  ECG/medicine tests: ordered and independent interpretation performed. Decision-making details documented in ED Course.    Risk  Prescription drug management.               ED Course as of 04/16/25 0558 Wed Apr 16, 2025 0226 Ten day gets ago patient had a positive PETH     Greater than 200 ng/mL: Heavy alcohol consumption or   chronic alcohol use (at least 4 drinks per day several days   a week)    [KL]   0253 CBC W/ AUTO DIFFERENTIAL(!)  Chronic, stable anemia.  No leukocytosis. [KL]   0315 Lipase: 23  Lipase within normal limits.  Patient with a chronic pancreatitis suspect lipase no longer elevates acute exacerbations. [KL]   0316 CBC W/ AUTO DIFFERENTIAL(!)  CBC is grossly unremarkable.  No leukocytosis.  Chronic, stable anemia.  [KL]   0316 ALT(!): 109 [KL]   0316 AST(!): 93  Chronic stable transaminitis [KL]   0436 Patient reassessed.  Still complaining of pain.  Counseled regarding  alcohol cessation.  Will give additional pain medication antiemetic.  States he does not want to be admitted to the hospital because he has to go take care of his dog.  Will discharge after IV fluids.  Strict return precautions discussed and patient expressed understanding and will return immediately for any worsening symptoms, inability to tolerate p.o. or recurrent blood in emesis. [KL]      ED Course User Index  [KL] Ashley Culver MD                           Clinical Impression:  Final diagnoses:  [F10.10] Alcohol abuse (Primary)  [K86.0] Alcohol-induced chronic pancreatitis          ED Disposition Condition    Discharge Stable          ED Prescriptions    None       Follow-up Information       Follow up With Specialties Details Why Contact Info    [facility] - Emergency Dept Emergency Medicine Go to  As needed, If symptoms worsen [address] [facility] [id number] [phone]    Pina Jones, NP Family Medicine Schedule an appointment as soon as possible for a visit   [address] [facility] [address]  [phone]                   [1]   Social History  Tobacco Use    Smoking status: Former     Types: Cigarettes    Smokeless tobacco: Never   Substance Use Topics    Alcohol use: Not Currently    Drug use: Yes     Types: Marijuana        Ashley Culver MD  04/16/25 0527

## 2025-04-17 ENCOUNTER — HOSPITAL ENCOUNTER (OUTPATIENT)
Facility: OTHER | Age: 25
Discharge: HOME | End: 2025-04-19
Attending: EMERGENCY MEDICINE | Admitting: EMERGENCY MEDICINE
Payer: MEDICAID

## 2025-04-17 DIAGNOSIS — K85.90 ACUTE ON CHRONIC PANCREATITIS: ICD-10-CM

## 2025-04-17 DIAGNOSIS — Z21 HIV INFECTION, UNSPECIFIED SYMPTOM STATUS: Primary | ICD-10-CM

## 2025-04-17 DIAGNOSIS — K85.90 PANCREATITIS: ICD-10-CM

## 2025-04-17 DIAGNOSIS — K86.1 ACUTE ON CHRONIC PANCREATITIS: ICD-10-CM

## 2025-04-17 LAB
ABSOLUTE EOSINOPHIL (OHS): 0.12 K/UL
ABSOLUTE MONOCYTE (OHS): 0.58 K/UL (ref 0.3–1)
ABSOLUTE NEUTROPHIL COUNT (OHS): 2.96 K/UL (ref 1.8–7.7)
ALBUMIN SERPL BCP-MCNC: 4 G/DL (ref 3.5–5.2)
ALP SERPL-CCNC: 98 UNIT/L (ref 40–150)
ALT SERPL W/O P-5'-P-CCNC: 78 UNIT/L (ref 10–44)
ANION GAP (OHS): 13 MMOL/L (ref 8–16)
AST SERPL-CCNC: 66 UNIT/L (ref 11–45)
BASOPHILS # BLD AUTO: 0.09 K/UL
BASOPHILS NFR BLD AUTO: 1.3 %
BILIRUB SERPL-MCNC: 0.3 MG/DL (ref 0.1–1)
BUN SERPL-MCNC: 9 MG/DL (ref 6–20)
CALCIUM SERPL-MCNC: 7.9 MG/DL (ref 8.7–10.5)
CHLORIDE SERPL-SCNC: 111 MMOL/L (ref 95–110)
CO2 SERPL-SCNC: 21 MMOL/L (ref 23–29)
CREAT SERPL-MCNC: 0.8 MG/DL (ref 0.5–1.4)
ERYTHROCYTE [DISTWIDTH] IN BLOOD BY AUTOMATED COUNT: 18.6 % (ref 11.5–14.5)
GFR SERPLBLD CREATININE-BSD FMLA CKD-EPI: >60 ML/MIN/1.73/M2
GLUCOSE SERPL-MCNC: 116 MG/DL (ref 70–110)
HCT VFR BLD AUTO: 41.5 % (ref 40–54)
HGB BLD-MCNC: 13 GM/DL (ref 14–18)
IMM GRANULOCYTES # BLD AUTO: 0.02 K/UL (ref 0–0.04)
IMM GRANULOCYTES NFR BLD AUTO: 0.3 % (ref 0–0.5)
LIPASE SERPL-CCNC: 22 U/L (ref 4–60)
LYMPHOCYTES # BLD AUTO: 3.27 K/UL (ref 1–4.8)
MCH RBC QN AUTO: 24.4 PG (ref 27–31)
MCHC RBC AUTO-ENTMCNC: 31.3 G/DL (ref 32–36)
MCV RBC AUTO: 78 FL (ref 82–98)
NUCLEATED RBC (/100WBC) (OHS): 0 /100 WBC
OHS QRS DURATION: 84 MS
OHS QTC CALCULATION: 461 MS
PLATELET # BLD AUTO: 325 K/UL (ref 150–450)
PMV BLD AUTO: 9.3 FL (ref 9.2–12.9)
POTASSIUM SERPL-SCNC: 3.7 MMOL/L (ref 3.5–5.1)
PROT SERPL-MCNC: 7.7 GM/DL (ref 6–8.4)
RBC # BLD AUTO: 5.32 M/UL (ref 4.6–6.2)
RELATIVE EOSINOPHIL (OHS): 1.7 %
RELATIVE LYMPHOCYTE (OHS): 46.4 % (ref 18–48)
RELATIVE MONOCYTE (OHS): 8.2 % (ref 4–15)
RELATIVE NEUTROPHIL (OHS): 42.1 % (ref 38–73)
SODIUM SERPL-SCNC: 145 MMOL/L (ref 136–145)
WBC # BLD AUTO: 7.04 K/UL (ref 3.9–12.7)

## 2025-04-17 PROCEDURE — 96375 TX/PRO/DX INJ NEW DRUG ADDON: CPT

## 2025-04-17 PROCEDURE — 99285 EMERGENCY DEPT VISIT HI MDM: CPT

## 2025-04-17 PROCEDURE — 63600175 PHARM REV CODE 636 W HCPCS: Performed by: EMERGENCY MEDICINE

## 2025-04-17 PROCEDURE — 85025 COMPLETE CBC W/AUTO DIFF WBC: CPT | Performed by: PHYSICIAN ASSISTANT

## 2025-04-17 PROCEDURE — 82040 ASSAY OF SERUM ALBUMIN: CPT | Performed by: PHYSICIAN ASSISTANT

## 2025-04-17 PROCEDURE — 83690 ASSAY OF LIPASE: CPT | Performed by: PHYSICIAN ASSISTANT

## 2025-04-17 RX ORDER — HYDROMORPHONE HYDROCHLORIDE 1 MG/ML
1 INJECTION, SOLUTION INTRAMUSCULAR; INTRAVENOUS; SUBCUTANEOUS
Refills: 0 | Status: COMPLETED | OUTPATIENT
Start: 2025-04-17 | End: 2025-04-17

## 2025-04-17 RX ORDER — ONDANSETRON HYDROCHLORIDE 2 MG/ML
4 INJECTION, SOLUTION INTRAVENOUS
Status: COMPLETED | OUTPATIENT
Start: 2025-04-17 | End: 2025-04-17

## 2025-04-17 RX ORDER — ONDANSETRON 4 MG/1
4 TABLET, ORALLY DISINTEGRATING ORAL
Status: DISCONTINUED | OUTPATIENT
Start: 2025-04-17 | End: 2025-04-17

## 2025-04-17 RX ADMIN — HYDROMORPHONE HYDROCHLORIDE 1 MG: 1 INJECTION, SOLUTION INTRAMUSCULAR; INTRAVENOUS; SUBCUTANEOUS at 11:04

## 2025-04-17 RX ADMIN — ONDANSETRON 4 MG: 2 INJECTION INTRAMUSCULAR; INTRAVENOUS at 11:04

## 2025-04-18 LAB
ABSOLUTE EOSINOPHIL (OHS): 0.18 K/UL
ABSOLUTE MONOCYTE (OHS): 0.55 K/UL (ref 0.3–1)
ABSOLUTE NEUTROPHIL COUNT (OHS): 1.61 K/UL (ref 1.8–7.7)
ALBUMIN SERPL BCP-MCNC: 3.4 G/DL (ref 3.5–5.2)
ALP SERPL-CCNC: 90 UNIT/L (ref 40–150)
ALT SERPL W/O P-5'-P-CCNC: 63 UNIT/L (ref 10–44)
ANION GAP (OHS): 10 MMOL/L (ref 8–16)
AST SERPL-CCNC: 52 UNIT/L (ref 11–45)
BASOPHILS # BLD AUTO: 0.08 K/UL
BASOPHILS NFR BLD AUTO: 1.2 %
BILIRUB SERPL-MCNC: 0.2 MG/DL (ref 0.1–1)
BUN SERPL-MCNC: 9 MG/DL (ref 6–20)
CALCIUM SERPL-MCNC: 7.7 MG/DL (ref 8.7–10.5)
CHLORIDE SERPL-SCNC: 107 MMOL/L (ref 95–110)
CO2 SERPL-SCNC: 25 MMOL/L (ref 23–29)
CREAT SERPL-MCNC: 0.7 MG/DL (ref 0.5–1.4)
ERYTHROCYTE [DISTWIDTH] IN BLOOD BY AUTOMATED COUNT: 18.6 % (ref 11.5–14.5)
GFR SERPLBLD CREATININE-BSD FMLA CKD-EPI: >60 ML/MIN/1.73/M2
GLUCOSE SERPL-MCNC: 76 MG/DL (ref 70–110)
HCT VFR BLD AUTO: 39.8 % (ref 40–54)
HGB BLD-MCNC: 12.3 GM/DL (ref 14–18)
IMM GRANULOCYTES # BLD AUTO: 0.01 K/UL (ref 0–0.04)
IMM GRANULOCYTES NFR BLD AUTO: 0.1 % (ref 0–0.5)
LYMPHOCYTES # BLD AUTO: 4.43 K/UL (ref 1–4.8)
MAGNESIUM SERPL-MCNC: 2.1 MG/DL (ref 1.6–2.6)
MCH RBC QN AUTO: 24.5 PG (ref 27–31)
MCHC RBC AUTO-ENTMCNC: 30.9 G/DL (ref 32–36)
MCV RBC AUTO: 79 FL (ref 82–98)
NUCLEATED RBC (/100WBC) (OHS): 0 /100 WBC
PHOSPHATE SERPL-MCNC: 4.4 MG/DL (ref 2.7–4.5)
PLATELET # BLD AUTO: 295 K/UL (ref 150–450)
PMV BLD AUTO: 9.4 FL (ref 9.2–12.9)
POTASSIUM SERPL-SCNC: 3.9 MMOL/L (ref 3.5–5.1)
PROT SERPL-MCNC: 6.7 GM/DL (ref 6–8.4)
RBC # BLD AUTO: 5.03 M/UL (ref 4.6–6.2)
RELATIVE EOSINOPHIL (OHS): 2.6 %
RELATIVE LYMPHOCYTE (OHS): 64.6 % (ref 18–48)
RELATIVE MONOCYTE (OHS): 8 % (ref 4–15)
RELATIVE NEUTROPHIL (OHS): 23.5 % (ref 38–73)
SODIUM SERPL-SCNC: 142 MMOL/L (ref 136–145)
WBC # BLD AUTO: 6.86 K/UL (ref 3.9–12.7)

## 2025-04-18 PROCEDURE — G0378 HOSPITAL OBSERVATION PER HR: HCPCS

## 2025-04-18 PROCEDURE — 83735 ASSAY OF MAGNESIUM: CPT | Performed by: NURSE PRACTITIONER

## 2025-04-18 PROCEDURE — 25000003 PHARM REV CODE 250: Performed by: EMERGENCY MEDICINE

## 2025-04-18 PROCEDURE — 96372 THER/PROPH/DIAG INJ SC/IM: CPT | Performed by: NURSE PRACTITIONER

## 2025-04-18 PROCEDURE — 96361 HYDRATE IV INFUSION ADD-ON: CPT

## 2025-04-18 PROCEDURE — 25000003 PHARM REV CODE 250: Performed by: NURSE PRACTITIONER

## 2025-04-18 PROCEDURE — 96376 TX/PRO/DX INJ SAME DRUG ADON: CPT

## 2025-04-18 PROCEDURE — 63600175 PHARM REV CODE 636 W HCPCS: Performed by: NURSE PRACTITIONER

## 2025-04-18 PROCEDURE — 85025 COMPLETE CBC W/AUTO DIFF WBC: CPT | Performed by: NURSE PRACTITIONER

## 2025-04-18 PROCEDURE — 82040 ASSAY OF SERUM ALBUMIN: CPT | Performed by: NURSE PRACTITIONER

## 2025-04-18 PROCEDURE — 84100 ASSAY OF PHOSPHORUS: CPT | Performed by: NURSE PRACTITIONER

## 2025-04-18 PROCEDURE — 96365 THER/PROPH/DIAG IV INF INIT: CPT

## 2025-04-18 PROCEDURE — 63600175 PHARM REV CODE 636 W HCPCS: Performed by: EMERGENCY MEDICINE

## 2025-04-18 PROCEDURE — 25500020 PHARM REV CODE 255: Performed by: EMERGENCY MEDICINE

## 2025-04-18 PROCEDURE — 36415 COLL VENOUS BLD VENIPUNCTURE: CPT | Performed by: NURSE PRACTITIONER

## 2025-04-18 RX ORDER — IBUPROFEN 200 MG
24 TABLET ORAL
Status: DISCONTINUED | OUTPATIENT
Start: 2025-04-18 | End: 2025-04-19 | Stop reason: HOSPADM

## 2025-04-18 RX ORDER — HYDROMORPHONE HYDROCHLORIDE 1 MG/ML
1 INJECTION, SOLUTION INTRAMUSCULAR; INTRAVENOUS; SUBCUTANEOUS EVERY 4 HOURS PRN
Status: DISCONTINUED | OUTPATIENT
Start: 2025-04-18 | End: 2025-04-18

## 2025-04-18 RX ORDER — ENOXAPARIN SODIUM 100 MG/ML
40 INJECTION SUBCUTANEOUS EVERY 24 HOURS
Status: DISCONTINUED | OUTPATIENT
Start: 2025-04-18 | End: 2025-04-19 | Stop reason: HOSPADM

## 2025-04-18 RX ORDER — NALOXONE HCL 0.4 MG/ML
0.02 VIAL (ML) INJECTION
Status: DISCONTINUED | OUTPATIENT
Start: 2025-04-18 | End: 2025-04-19 | Stop reason: HOSPADM

## 2025-04-18 RX ORDER — SODIUM CHLORIDE, SODIUM LACTATE, POTASSIUM CHLORIDE, CALCIUM CHLORIDE 600; 310; 30; 20 MG/100ML; MG/100ML; MG/100ML; MG/100ML
INJECTION, SOLUTION INTRAVENOUS CONTINUOUS
Status: DISCONTINUED | OUTPATIENT
Start: 2025-04-18 | End: 2025-04-19 | Stop reason: HOSPADM

## 2025-04-18 RX ORDER — IBUPROFEN 200 MG
16 TABLET ORAL
Status: DISCONTINUED | OUTPATIENT
Start: 2025-04-18 | End: 2025-04-19 | Stop reason: HOSPADM

## 2025-04-18 RX ORDER — HYDROMORPHONE HYDROCHLORIDE 1 MG/ML
0.5 INJECTION, SOLUTION INTRAMUSCULAR; INTRAVENOUS; SUBCUTANEOUS
Status: COMPLETED | OUTPATIENT
Start: 2025-04-18 | End: 2025-04-18

## 2025-04-18 RX ORDER — ONDANSETRON HYDROCHLORIDE 2 MG/ML
4 INJECTION, SOLUTION INTRAVENOUS
Status: COMPLETED | OUTPATIENT
Start: 2025-04-18 | End: 2025-04-18

## 2025-04-18 RX ORDER — ACETAMINOPHEN 325 MG/1
650 TABLET ORAL EVERY 4 HOURS PRN
Status: DISCONTINUED | OUTPATIENT
Start: 2025-04-18 | End: 2025-04-19 | Stop reason: HOSPADM

## 2025-04-18 RX ORDER — SODIUM CHLORIDE 0.9 % (FLUSH) 0.9 %
10 SYRINGE (ML) INJECTION EVERY 8 HOURS PRN
Status: DISCONTINUED | OUTPATIENT
Start: 2025-04-18 | End: 2025-04-19 | Stop reason: HOSPADM

## 2025-04-18 RX ORDER — HYDROMORPHONE HYDROCHLORIDE 1 MG/ML
0.5 INJECTION, SOLUTION INTRAMUSCULAR; INTRAVENOUS; SUBCUTANEOUS EVERY 4 HOURS PRN
Refills: 0 | Status: DISCONTINUED | OUTPATIENT
Start: 2025-04-18 | End: 2025-04-18

## 2025-04-18 RX ORDER — ONDANSETRON HYDROCHLORIDE 2 MG/ML
4 INJECTION, SOLUTION INTRAVENOUS EVERY 8 HOURS PRN
Status: DISCONTINUED | OUTPATIENT
Start: 2025-04-18 | End: 2025-04-19 | Stop reason: HOSPADM

## 2025-04-18 RX ORDER — GLUCAGON 1 MG
1 KIT INJECTION
Status: DISCONTINUED | OUTPATIENT
Start: 2025-04-18 | End: 2025-04-19 | Stop reason: HOSPADM

## 2025-04-18 RX ORDER — HYDROMORPHONE HYDROCHLORIDE 2 MG/ML
2 INJECTION, SOLUTION INTRAMUSCULAR; INTRAVENOUS; SUBCUTANEOUS EVERY 4 HOURS PRN
Status: DISCONTINUED | OUTPATIENT
Start: 2025-04-18 | End: 2025-04-19 | Stop reason: HOSPADM

## 2025-04-18 RX ADMIN — ONDANSETRON 4 MG: 2 INJECTION INTRAMUSCULAR; INTRAVENOUS at 01:04

## 2025-04-18 RX ADMIN — HYDROMORPHONE HYDROCHLORIDE 2 MG: 2 INJECTION INTRAMUSCULAR; INTRAVENOUS; SUBCUTANEOUS at 05:04

## 2025-04-18 RX ADMIN — HYDROMORPHONE HYDROCHLORIDE 2 MG: 2 INJECTION INTRAMUSCULAR; INTRAVENOUS; SUBCUTANEOUS at 10:04

## 2025-04-18 RX ADMIN — SODIUM CHLORIDE, POTASSIUM CHLORIDE, SODIUM LACTATE AND CALCIUM CHLORIDE: 600; 310; 30; 20 INJECTION, SOLUTION INTRAVENOUS at 03:04

## 2025-04-18 RX ADMIN — HYDROMORPHONE HYDROCHLORIDE 2 MG: 2 INJECTION INTRAMUSCULAR; INTRAVENOUS; SUBCUTANEOUS at 04:04

## 2025-04-18 RX ADMIN — ONDANSETRON 4 MG: 2 INJECTION INTRAMUSCULAR; INTRAVENOUS at 05:04

## 2025-04-18 RX ADMIN — SODIUM CHLORIDE 1000 ML: 9 INJECTION, SOLUTION INTRAVENOUS at 12:04

## 2025-04-18 RX ADMIN — HYDROMORPHONE HYDROCHLORIDE 0.5 MG: 1 INJECTION, SOLUTION INTRAMUSCULAR; INTRAVENOUS; SUBCUTANEOUS at 01:04

## 2025-04-18 RX ADMIN — ONDANSETRON 4 MG: 2 INJECTION INTRAMUSCULAR; INTRAVENOUS at 07:04

## 2025-04-18 RX ADMIN — IOHEXOL 75 ML: 350 INJECTION, SOLUTION INTRAVENOUS at 12:04

## 2025-04-18 RX ADMIN — BICTEGRAVIR SODIUM, EMTRICITABINE, AND TENOFOVIR ALAFENAMIDE FUMARATE 1 TABLET: 50; 200; 25 TABLET ORAL at 08:04

## 2025-04-18 RX ADMIN — PROMETHAZINE HYDROCHLORIDE 12.5 MG: 25 INJECTION INTRAMUSCULAR; INTRAVENOUS at 04:04

## 2025-04-18 RX ADMIN — HYDROMORPHONE HYDROCHLORIDE 2 MG: 2 INJECTION INTRAMUSCULAR; INTRAVENOUS; SUBCUTANEOUS at 08:04

## 2025-04-18 RX ADMIN — HYDROMORPHONE HYDROCHLORIDE 2 MG: 2 INJECTION INTRAMUSCULAR; INTRAVENOUS; SUBCUTANEOUS at 01:04

## 2025-04-18 NOTE — HOSPITAL COURSE
Admitted for alcohol pancreatitis. CT of the abdomen showed mild pancreatic inflammation without further pathology. Made NPO with pain control, antiemetics, IV fluids and electrolyte replacement.     Slowly improved to point he was tolerating diet.

## 2025-04-18 NOTE — FIRST PROVIDER EVALUATION
Emergency Department TeleTriage Encounter Note      CHIEF COMPLAINT    Chief Complaint   Patient presents with    Abdominal Pain     Pt presents with c/o LUQ pain all day. Hx of pancreatitis. No meds attempted pta. +n/v       VITAL SIGNS   Initial Vitals [04/17/25 2219]   BP Pulse Resp Temp SpO2   121/85 92 18 98.1 °F (36.7 °C) 99 %      MAP       --            ALLERGIES    Review of patient's allergies indicates:   Allergen Reactions    Bloomery Swelling    Droperidol Hives    Pecan nut Swelling    Penicillins Hives     Tolerates cephalosporins    Tolerated piperacillin/tazobactam 11/2023    Sulfa (sulfonamide antibiotics) Hives    Opioids - morphine analogues Itching, Nausea And Vomiting and Rash    Tolonium chloride(toluidine blue-o) Hives, Itching and Rash    Toradol [ketorolac] Rash       PROVIDER TRIAGE NOTE  Patient presents with severe epigastric pain, nausea and vomiting. He was seen in the ED yesterday and was going to be admitted, but left because he needed to care for his dog. History of necrotizing pancreatitis, HIV and other comorbidities.       ORDERS  Labs Reviewed - No data to display    ED Orders (720h ago, onward)      None              Virtual Visit Note: The provider triage portion of this emergency department evaluation and documentation was performed via [employer], a HIPAA-compliant telemedicine application, in concert with a tele-presenter in the room. A face to face patient evaluation with one of my colleagues will occur once the patient is placed in an emergency department room.      DISCLAIMER: This note was prepared with [employer] voice recognition transcription software. Garbled syntax, mangled pronouns, and other bizarre constructions may be attributed to that software system.

## 2025-04-18 NOTE — NURSING
Pt arrived to unit via w/c with x1 transporter in attendance.  A/O x4.  Respirations unlabored on RA.  Skin WDI.  VSS.  Pt c/o pain 9/10 and nausea.  States per admit MD Dilaudid would be increased to 2mg and since too early for Zofran is asking for Phenergan.  Will message to notify of above and medicate shortly.

## 2025-04-18 NOTE — SUBJECTIVE & OBJECTIVE
Interval History: still with similar abd pain, has tolerated a small amount of liquid. He is asking for a chicken sandwich.     Review of Systems   Constitutional:  Positive for appetite change. Negative for activity change, fatigue and fever.   Respiratory:  Negative for apnea, shortness of breath and wheezing.    Cardiovascular:  Negative for chest pain and leg swelling.   Gastrointestinal:  Positive for abdominal pain, nausea and vomiting. Negative for abdominal distention.   Genitourinary:  Negative for difficulty urinating, flank pain and hematuria.   Skin:  Negative for pallor and rash.   Neurological:  Negative for dizziness, speech difficulty, light-headedness and headaches.     Objective:     Vital Signs (Most Recent):  Temp: 98.1 °F (36.7 °C) (04/18/25 1205)  Pulse: 78 (04/18/25 1205)  Resp: 17 (04/18/25 1344)  BP: 124/81 (04/18/25 1205)  SpO2: 97 % (04/18/25 1205) Vital Signs (24h Range):  Temp:  [97.8 °F (36.6 °C)-98.7 °F (37.1 °C)] 98.1 °F (36.7 °C)  Pulse:  [78-92] 78  Resp:  [17-20] 17  SpO2:  [97 %-99 %] 97 %  BP: (121-125)/(75-90) 124/81     Weight: 78.2 kg (172 lb 6.4 oz)  Body mass index is 24.74 kg/m².    Intake/Output Summary (Last 24 hours) at 4/18/2025 1352  Last data filed at 4/18/2025 0933  Gross per 24 hour   Intake 1528.61 ml   Output --   Net 1528.61 ml         Physical Exam  Constitutional:       Appearance: Normal appearance. He is well-developed.   HENT:      Head: Normocephalic.   Eyes:      Conjunctiva/sclera: Conjunctivae normal.   Cardiovascular:      Rate and Rhythm: Normal rate and regular rhythm.      Pulses:           Radial pulses are 2+ on the right side and 2+ on the left side.      Heart sounds: Normal heart sounds.   Pulmonary:      Effort: Pulmonary effort is normal.      Breath sounds: Normal breath sounds.   Abdominal:      General: Bowel sounds are increased. There is distension.      Palpations: Abdomen is soft.      Tenderness: There is abdominal tenderness in the  right upper quadrant and epigastric area.   Musculoskeletal:         General: Normal range of motion.      Cervical back: Normal range of motion and neck supple.   Skin:     General: Skin is warm and dry.   Neurological:      Mental Status: He is alert and oriented to person, place, and time.      GCS: GCS eye subscore is 4. GCS verbal subscore is 5. GCS motor subscore is 6.      Motor: Motor function is intact.   Psychiatric:         Mood and Affect: Mood normal.         Speech: Speech normal.         Behavior: Behavior normal.               Significant Labs: All pertinent labs within the past 24 hours have been reviewed.    Significant Imaging: I have reviewed all pertinent imaging results/findings within the past 24 hours.

## 2025-04-18 NOTE — ASSESSMENT & PLAN NOTE
CT- Mild peripancreatic inflammatory changes likely reflecting acute pancreatitis.  Inflammatory changes are however less pronounced from previous CT 03/29/2025.  Hepatosplenomegaly with suspected hepatic steatosis.    Will start some soft diet to see if he tolerates. Likely ok to discharge in AM if he is able to eat ok today.  IVF  Dilaudid  Zofran

## 2025-04-18 NOTE — PROGRESS NOTES
[facility] Surg [address] Medicine  Progress Note    Patient Name: Tasia Cardona  MRN: [id number]  Patient Class: OP- Observation   Admission Date: 4/17/2025  Length of Stay: 0 days  Attending Physician: Aris Churchill MD  Primary Care Provider: Pina Jones NP        Subjective     Principal Problem:Acute on chronic pancreatitis        HPI:  The patient is a 24 y.o. 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 He states he went to ED here yesterday for same issue, and he believes his symptoms are part of a pancreatitis flare up.  CT positive for mild pancreatitis.  Will admit for further management of his acute on chronic pancreatitis.      Overview/Hospital Course:  Admitted for alcohol pancreatitis, came in with elevated Alc levels. CT of the abdomen showed mild pancreatic inflammation without further pathology. Made NPO with pain control, IV fluids and electrolyte replacement. Trial soft diet 4/18.    Interval History: still with similar abd pain, has tolerated a small amount of liquid. He is asking for a chicken sandwich.     Review of Systems   Constitutional:  Positive for appetite change. Negative for activity change, fatigue and fever.   Respiratory:  Negative for apnea, shortness of breath and wheezing.    Cardiovascular:  Negative for chest pain and leg swelling.   Gastrointestinal:  Positive for abdominal pain, nausea and vomiting. Negative for abdominal distention.   Genitourinary:  Negative for difficulty urinating, flank pain and hematuria.   Skin:  Negative for pallor and rash.   Neurological:  Negative for dizziness, speech difficulty, light-headedness and headaches.     Objective:     Vital Signs (Most Recent):  Temp: 98.1 °F (36.7  °C) (04/18/25 1205)  Pulse: 78 (04/18/25 1205)  Resp: 17 (04/18/25 1344)  BP: 124/81 (04/18/25 1205)  SpO2: 97 % (04/18/25 1205) Vital Signs (24h Range):  Temp:  [97.8 °F (36.6 °C)-98.7 °F (37.1 °C)] 98.1 °F (36.7 °C)  Pulse:  [78-92] 78  Resp:  [17-20] 17  SpO2:  [97 %-99 %] 97 %  BP: (121-125)/(75-90) 124/81     Weight: 78.2 kg (172 lb 6.4 oz)  Body mass index is 24.74 kg/m².    Intake/Output Summary (Last 24 hours) at 4/18/2025 1352  Last data filed at 4/18/2025 0933  Gross per 24 hour   Intake 1528.61 ml   Output --   Net 1528.61 ml         Physical Exam  Constitutional:       Appearance: Normal appearance. He is well-developed.   HENT:      Head: Normocephalic.   Eyes:      Conjunctiva/sclera: Conjunctivae normal.   Cardiovascular:      Rate and Rhythm: Normal rate and regular rhythm.      Pulses:           Radial pulses are 2+ on the right side and 2+ on the left side.      Heart sounds: Normal heart sounds.   Pulmonary:      Effort: Pulmonary effort is normal.      Breath sounds: Normal breath sounds.   Abdominal:      General: Bowel sounds are increased. There is distension.      Palpations: Abdomen is soft.      Tenderness: There is abdominal tenderness in the right upper quadrant and epigastric area.   Musculoskeletal:         General: Normal range of motion.      Cervical back: Normal range of motion and neck supple.   Skin:     General: Skin is warm and dry.   Neurological:      Mental Status: He is alert and oriented to person, place, and time.      GCS: GCS eye subscore is 4. GCS verbal subscore is 5. GCS motor subscore is 6.      Motor: Motor function is intact.   Psychiatric:         Mood and Affect: Mood normal.         Speech: Speech normal.         Behavior: Behavior normal.               Significant Labs: All pertinent labs within the past 24 hours have been reviewed.    Significant Imaging: I have reviewed all pertinent imaging results/findings within the past 24 hours.      Assessment &  Plan  Acute on chronic pancreatitis  CT- Mild peripancreatic inflammatory changes likely reflecting acute pancreatitis.  Inflammatory changes are however less pronounced from previous CT 03/29/2025.  Hepatosplenomegaly with suspected hepatic steatosis.    Will start some soft diet to see if he tolerates. Likely ok to discharge in AM if he is able to eat ok today.  IVF  Dilaudid  Zofran    HIV infection  Continue Biktarvy    VTE Risk Mitigation (From admission, onward)           Ordered     enoxaparin injection 40 mg  Daily         04/18/25 0123     IP VTE HIGH RISK PATIENT  Once         04/18/25 0123     Place sequential compression device  Until discontinued         04/18/25 0123                    Discharge Planning   CASTILLO:      Code Status: Full Code   Medical Readiness for Discharge Date:   Discharge Plan A: Home                        Aris Churchill MD  Department of Hospital Medicine   [facility] Surg ([address])

## 2025-04-18 NOTE — PLAN OF CARE
POC reviewed with patient this shift.  A/O x4.  Respirations unlabored on RA.  Skin w/d.  IVF in progress as ordered.  Continent of b/b.  Still pending U/A collection.  Re-educated on need for sample.  States understanding.  Pain and nausea continues and being managed with around the clock dosing.  NPO status maintained as ordered.  No episodes of emesis observed or reported.  VSS.  See flowsheet for full assessment.  Able to verbalize wants/needs.  No s/s of distress.  Fall/safety precautions maintained.      Problem: Adult Inpatient Plan of Care  Goal: Plan of Care Review  Outcome: Progressing     Problem: Fall Injury Risk  Goal: Absence of Fall and Fall-Related Injury  Outcome: Progressing     Problem: Pain Acute  Goal: Optimal Pain Control and Function  Outcome: Not Progressing  Intervention: Develop Pain Management Plan  Flowsheets (Taken 4/18/2025 3760)  Pain Management Interventions: around-the-clock dosing utilized

## 2025-04-18 NOTE — SUBJECTIVE & OBJECTIVE
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       Past Surgical History:   Procedure Laterality Date    ENDOSCOPIC ULTRASOUND OF UPPER GASTROINTESTINAL TRACT N/A 10/23/2023    Procedure: ULTRASOUND, UPPER GI TRACT, ENDOSCOPIC;  Surgeon: Emil Villatoro MD;  Location: [facility] ([address] [place] [address]);  Service: Endoscopy;  Laterality: N/A;    ENDOSCOPIC ULTRASOUND OF UPPER GASTROINTESTINAL TRACT N/A 7/24/2024    Procedure: ULTRASOUND, UPPER GI TRACT, ENDOSCOPIC;  Surgeon: Faustino Trujillo MD;  Location: [facility] ([address] [place] [address]);  Service: Endoscopy;  Laterality: N/A;    ERCP N/A 10/23/2023    Procedure: ERCP (ENDOSCOPIC RETROGRADE CHOLANGIOPANCREATOGRAPHY);  Surgeon: Emil Villatoro MD;  Location: [facility] (2ND FLR);  Service: Endoscopy;  Laterality: N/A;    ESOPHAGOGASTRODUODENOSCOPY N/A 9/18/2023    Procedure: EGD (ESOPHAGOGASTRODUODENOSCOPY);  Surgeon: Kg Cleaning MD;  [facility]  Service: Endoscopy;  Laterality: N/A;    ESOPHAGOGASTRODUODENOSCOPY N/A 3/8/2024    Procedure: EGD (ESOPHAGOGASTRODUODENOSCOPY);  Surgeon: Greg Love MD;  Location: [facility] (2ND FLR);  Service: Endoscopy;  Laterality: N/A;    ESOPHAGOGASTRODUODENOSCOPY N/A 7/24/2024    Procedure: EGD (ESOPHAGOGASTRODUODENOSCOPY);  Surgeon: Faustino Trujillo MD;  Location: [facility] (2ND FLR);  Service: Endoscopy;  Laterality: N/A;       Review of patient's allergies indicates:   Allergen Reactions    [place] Swelling    Droperidol Hives    Pecan nut Swelling     Penicillins Hives     Tolerates cephalosporins    Tolerated piperacillin/tazobactam 11/2023    Sulfa (sulfonamide antibiotics) Hives    Opioids - morphine analogues Itching, Nausea And Vomiting and Rash    Tolonium chloride(toluidine blue-o) Hives, Itching and Rash    Toradol [ketorolac] Rash       No current facility-administered medications on file prior to encounter.     Current Outpatient Medications on File Prior to Encounter   Medication Sig    acetaminophen (TYLENOL) 500 MG tablet Take 2 tablets (1,000 mg total) by mouth every 8 (eight) hours as needed for Pain.    taunayyoh-odvgvnac-ljwnsab ala (BIKTARVY) -25 mg (25 kg or greater) Take 1 tablet by mouth once daily.    promethazine (PHENERGAN) 12.5 MG Tab Take 12.5 mg by mouth 4 (four) times daily.    senna-docusate (PERICOLACE) 8.6-50 mg per tablet Take 1 tablet by mouth 2 (two) times daily.     Family History       Problem Relation (Age of Onset)    Breast cancer Maternal Grandmother    Cancer Mother, Brother    No Known Problems Father    Ovarian cancer Mother    Pancreatitis Mother          Tobacco Use    Smoking status: Former     Types: Cigarettes    Smokeless tobacco: Never   Substance and Sexual Activity    Alcohol use: Not Currently    Drug use: Yes     Types: Marijuana    Sexual activity: Not on file     Review of Systems   Constitutional:  Positive for appetite change. Negative for activity change, fatigue and fever.   HENT:  Negative for congestion, ear pain and postnasal drip.    Eyes:  Negative for discharge.   Respiratory:  Negative for apnea, shortness of breath and wheezing.    Cardiovascular:  Negative for chest pain and leg swelling.   Gastrointestinal:  Positive for abdominal pain, nausea and vomiting. Negative for abdominal distention.   Endocrine: Negative for polydipsia, polyphagia and polyuria.   Genitourinary:  Negative for difficulty urinating, flank pain, frequency, hematuria and urgency.   Musculoskeletal:  Negative for  arthralgias and joint swelling.   Skin:  Negative for pallor and rash.   Allergic/Immunologic: Negative for environmental allergies and food allergies.   Neurological:  Negative for dizziness, speech difficulty, weakness, light-headedness and headaches.   Hematological:  Does not bruise/bleed easily.   Psychiatric/Behavioral:  Negative for agitation.      Objective:     Vital Signs (Most Recent):  Temp: 98 °F (36.7 °C) (04/18/25 0127)  Pulse: 91 (04/18/25 0127)  Resp: 20 (04/18/25 0127)  BP: 125/75 (04/18/25 0127)  SpO2: 97 % (04/18/25 0127) Vital Signs (24h Range):  Temp:  [98 °F (36.7 °C)-98.1 °F (36.7 °C)] 98 °F (36.7 °C)  Pulse:  [91-92] 91  Resp:  [18-20] 20  SpO2:  [97 %-99 %] 97 %  BP: (121-125)/(75-85) 125/75     Weight: 77.1 kg (170 lb)  Body mass index is 24.39 kg/m².     Physical Exam  Constitutional:       Appearance: Normal appearance. He is well-developed.   HENT:      Head: Normocephalic.   Eyes:      Conjunctiva/sclera: Conjunctivae normal.   Cardiovascular:      Rate and Rhythm: Regular rhythm. Tachycardia present.      Pulses:           Radial pulses are 2+ on the right side and 2+ on the left side.      Heart sounds: Normal heart sounds.   Pulmonary:      Effort: Pulmonary effort is normal.      Breath sounds: Normal breath sounds.   Abdominal:      General: Bowel sounds are increased. There is distension.      Palpations: Abdomen is soft.      Tenderness: There is abdominal tenderness in the right upper quadrant and epigastric area.   Musculoskeletal:         General: Normal range of motion.      Cervical back: Normal range of motion and neck supple.   Skin:     General: Skin is warm and dry.   Neurological:      Mental Status: He is alert and oriented to person, place, and time.      GCS: GCS eye subscore is 4. GCS verbal subscore is 5. GCS motor subscore is 6.      Motor: Motor function is intact.   Psychiatric:         Mood and Affect: Mood normal.         Speech: Speech normal.          Behavior: Behavior normal.                Significant Labs: All pertinent labs within the past 24 hours have been reviewed.  CBC:   Recent Labs   Lab 04/16/25  0242 04/17/25  2234   WBC 6.47 7.04   HGB 13.8* 13.0*   HCT 44.2 41.5    325     CMP:   Recent Labs   Lab 04/16/25  0242 04/17/25  2234    145   K 3.7 3.7    111*   CO2 22* 21*   BUN 10 9   CREATININE 0.8 0.8   CALCIUM 8.4* 7.9*   ALBUMIN 4.1 4.0   BILITOT 0.2 0.3   ALKPHOS 108 98   AST 93* 66*   * 78*   ANIONGAP 12 13       Significant Imaging: I have reviewed all pertinent imaging results/findings within the past 24 hours.

## 2025-04-18 NOTE — H&P
[facility] Medicine  History & Physical    Patient Name: Tasia Cardona  MRN: [id number]  Patient Class: OP- Observation  Admission Date: 4/17/2025  Attending Physician: Allan Jones MD   Primary Care Provider: Pina Jones NP         Patient information was obtained from patient, past medical records, and ER records.     Subjective:     Principal Problem:Acute on chronic pancreatitis    Chief Complaint:   Chief Complaint   Patient presents with    Abdominal Pain     Pt presents with c/o LUQ pain all day. Hx of pancreatitis. No meds attempted pta. +n/v        HPI: The patient is a 24 y.o. 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 He states he went to ED here yesterday for same issue, and he believes his symptoms are part of a pancreatitis flare up.  CT positive for mild pancreatitis.  Will admit for further management of his acute on chronic pancreatitis.      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       Past Surgical History:   Procedure Laterality Date    ENDOSCOPIC ULTRASOUND OF UPPER GASTROINTESTINAL TRACT N/A 10/23/2023    Procedure: ULTRASOUND, UPPER GI TRACT, ENDOSCOPIC;  Surgeon: Emil Villatoro MD;  Location: [address];  Service: Endoscopy;   Laterality: N/A;    ENDOSCOPIC ULTRASOUND OF UPPER GASTROINTESTINAL TRACT N/A 7/24/2024    Procedure: ULTRASOUND, UPPER GI TRACT, ENDOSCOPIC;  Surgeon: Faustino Trujillo MD;  Location: [facility] (2ND FLR);  Service: Endoscopy;  Laterality: N/A;    ERCP N/A 10/23/2023    Procedure: ERCP (ENDOSCOPIC RETROGRADE CHOLANGIOPANCREATOGRAPHY);  Surgeon: Emil Villatoro MD;  Location: [facility] (2ND FLR);  Service: Endoscopy;  Laterality: N/A;    ESOPHAGOGASTRODUODENOSCOPY N/A 9/18/2023    Procedure: EGD (ESOPHAGOGASTRODUODENOSCOPY);  Surgeon: Kg Cleaning MD;  Location: [facility] (2ND FLR);  Service: Endoscopy;  Laterality: N/A;    ESOPHAGOGASTRODUODENOSCOPY N/A 3/8/2024    Procedure: EGD (ESOPHAGOGASTRODUODENOSCOPY);  Surgeon: Greg Love MD;  Location: [facility] (2ND FLR);  Service: Endoscopy;  Laterality: N/A;    ESOPHAGOGASTRODUODENOSCOPY N/A 7/24/2024    Procedure: EGD (ESOPHAGOGASTRODUODENOSCOPY);  Surgeon: Faustino Trujillo MD;  Location: [facility] (2ND FLR);  Service: Endoscopy;  Laterality: N/A;       Review of patient's allergies indicates:   Allergen Reactions    [place] Swelling    Droperidol Hives    Pecan nut Swelling    Penicillins Hives     Tolerates cephalosporins    Tolerated piperacillin/tazobactam 11/2023    Sulfa (sulfonamide antibiotics) Hives    Opioids - morphine analogues Itching, Nausea And Vomiting and Rash    Tolonium chloride(toluidine blue-o) Hives, Itching and Rash    Toradol [ketorolac] Rash       No current facility-administered medications on file prior to encounter.     Current Outpatient Medications on File Prior to Encounter   Medication Sig    acetaminophen (TYLENOL) 500 MG tablet Take 2 tablets (1,000 mg total) by mouth every 8 (eight) hours as needed for Pain.    knjzyfctr-gjkgnnka-qfbpoji ala (BIKTARVY) -25 mg (25 kg or greater) Take 1 tablet by mouth once daily.    promethazine (PHENERGAN) 12.5 MG Tab Take 12.5 mg by mouth 4 (four) times daily.    senna-docusate  (PERICOLACE) 8.6-50 mg per tablet Take 1 tablet by mouth 2 (two) times daily.     Family History       Problem Relation (Age of Onset)    Breast cancer Maternal Grandmother    Cancer Mother, Brother    No Known Problems Father    Ovarian cancer Mother    Pancreatitis Mother          Tobacco Use    Smoking status: Former     Types: Cigarettes    Smokeless tobacco: Never   Substance and Sexual Activity    Alcohol use: Not Currently    Drug use: Yes     Types: Marijuana    Sexual activity: Not on file     Review of Systems   Constitutional:  Positive for appetite change. Negative for activity change, fatigue and fever.   HENT:  Negative for congestion, ear pain and postnasal drip.    Eyes:  Negative for discharge.   Respiratory:  Negative for apnea, shortness of breath and wheezing.    Cardiovascular:  Negative for chest pain and leg swelling.   Gastrointestinal:  Positive for abdominal pain, nausea and vomiting. Negative for abdominal distention.   Endocrine: Negative for polydipsia, polyphagia and polyuria.   Genitourinary:  Negative for difficulty urinating, flank pain, frequency, hematuria and urgency.   Musculoskeletal:  Negative for arthralgias and joint swelling.   Skin:  Negative for pallor and rash.   Allergic/Immunologic: Negative for environmental allergies and food allergies.   Neurological:  Negative for dizziness, speech difficulty, weakness, light-headedness and headaches.   Hematological:  Does not bruise/bleed easily.   Psychiatric/Behavioral:  Negative for agitation.      Objective:     Vital Signs (Most Recent):  Temp: 98 °F (36.7 °C) (04/18/25 0127)  Pulse: 91 (04/18/25 0127)  Resp: 20 (04/18/25 0127)  BP: 125/75 (04/18/25 0127)  SpO2: 97 % (04/18/25 0127) Vital Signs (24h Range):  Temp:  [98 °F (36.7 °C)-98.1 °F (36.7 °C)] 98 °F (36.7 °C)  Pulse:  [91-92] 91  Resp:  [18-20] 20  SpO2:  [97 %-99 %] 97 %  BP: (121-125)/(75-85) 125/75     Weight: 77.1 kg (170 lb)  Body mass index is 24.39 kg/m².      Physical Exam  Constitutional:       Appearance: Normal appearance. He is well-developed.   HENT:      Head: Normocephalic.   Eyes:      Conjunctiva/sclera: Conjunctivae normal.   Cardiovascular:      Rate and Rhythm: Regular rhythm. Tachycardia present.      Pulses:           Radial pulses are 2+ on the right side and 2+ on the left side.      Heart sounds: Normal heart sounds.   Pulmonary:      Effort: Pulmonary effort is normal.      Breath sounds: Normal breath sounds.   Abdominal:      General: Bowel sounds are increased. There is distension.      Palpations: Abdomen is soft.      Tenderness: There is abdominal tenderness in the right upper quadrant and epigastric area.   Musculoskeletal:         General: Normal range of motion.      Cervical back: Normal range of motion and neck supple.   Skin:     General: Skin is warm and dry.   Neurological:      Mental Status: He is alert and oriented to person, place, and time.      GCS: GCS eye subscore is 4. GCS verbal subscore is 5. GCS motor subscore is 6.      Motor: Motor function is intact.   Psychiatric:         Mood and Affect: Mood normal.         Speech: Speech normal.         Behavior: Behavior normal.                Significant Labs: All pertinent labs within the past 24 hours have been reviewed.  CBC:   Recent Labs   Lab 04/16/25  0242 04/17/25  2234   WBC 6.47 7.04   HGB 13.8* 13.0*   HCT 44.2 41.5    325     CMP:   Recent Labs   Lab 04/16/25  0242 04/17/25  2234    145   K 3.7 3.7    111*   CO2 22* 21*   BUN 10 9   CREATININE 0.8 0.8   CALCIUM 8.4* 7.9*   ALBUMIN 4.1 4.0   BILITOT 0.2 0.3   ALKPHOS 108 98   AST 93* 66*   * 78*   ANIONGAP 12 13       Significant Imaging: I have reviewed all pertinent imaging results/findings within the past 24 hours.  Assessment/Plan:     Assessment & Plan  Acute on chronic pancreatitis  CT- Mild peripancreatic inflammatory changes likely reflecting acute pancreatitis.  Inflammatory changes are  however less pronounced from previous CT 03/29/2025.  Hepatosplenomegaly with suspected hepatic steatosis.    NPO  IVF  Dilaudid  Zofran    HIV infection  Continue Biktarvy    VTE Risk Mitigation (From admission, onward)           Ordered     enoxaparin injection 40 mg  Daily         04/18/25 0123     IP VTE HIGH RISK PATIENT  Once         04/18/25 0123     Place sequential compression device  Until discontinued         04/18/25 0123                         On 04/18/2025, patient should be placed in hospital observation services under my care in collaboration with Dr. Jones.           Mauro Duke NP  Department of Hospital Medicine  [facility] - Emergency Dept

## 2025-04-18 NOTE — ASSESSMENT & PLAN NOTE
CT- Mild peripancreatic inflammatory changes likely reflecting acute pancreatitis.  Inflammatory changes are however less pronounced from previous CT 03/29/2025.  Hepatosplenomegaly with suspected hepatic steatosis.    NPO  IVF  Dilaudid  Zofran

## 2025-04-18 NOTE — ED PROVIDER NOTES
Encounter Date: 4/17/2025    SCRIBE #1 NOTE: I, Wanda Thorne, am scribing for, and in the presence of,  Sander Hurd MD. I have scribed the following portions of the note - Other sections scribed: HPI, ROS, PE.   SCRIBE #2 NOTE: I, Rigo Bangura, am scribing for, and in the presence of,  Sander Hurd MD. I have scribed the remaining portions of the note not scribed by Scribe #1.     History     Chief Complaint   Patient presents with    Abdominal Pain     Pt presents with c/o LUQ pain all day. Hx of pancreatitis. No meds attempted pta. +n/v     This is a 24 y.o. 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 He states he went to ED here yesterday for same issue, and he believes his symptoms are part of a pancreatitis flare up. He states that he was supposed to be admitted to the hospital, but he had personal issues to attend to before he could be admitted. He denies any alcohol or drug use.     The history is provided by the patient. No  was used.     Review of patient's allergies indicates:   Allergen Reactions    Moran Swelling    Droperidol Hives    Pecan nut Swelling    Penicillins Hives     Tolerates cephalosporins    Tolerated piperacillin/tazobactam 11/2023    Sulfa (sulfonamide antibiotics) Hives    Opioids - morphine analogues Itching, Nausea And Vomiting and Rash    Tolonium chloride(toluidine blue-o) Hives, Itching and Rash    Toradol [ketorolac] Rash     Past Medical History:   Diagnosis Date    Acute necrotizing pancreatitis 09/20/2023    Alcohol use disorder 10/05/2023    Asthma     Chronic pancreatitis 09/20/2023    Hepatitis C antibody positive in blood 09/18/2023 9/2023 PCR negative    HIV infection 10/2021    Corinne-Chauhan tear  09/18/2023    Normocytic anemia 09/18/2023    Pancreatic pseudocyst/cyst 10/24/2023    Polycythemia vera 11/28/2021    Receives phlebotomy if Hct>45    Positive CMV IgG serology 09/21/2023    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     Past Surgical History:   Procedure Laterality Date    ENDOSCOPIC ULTRASOUND OF UPPER GASTROINTESTINAL TRACT N/A 10/23/2023    Procedure: ULTRASOUND, UPPER GI TRACT, ENDOSCOPIC;  Surgeon: Emil Villatoro MD;  Location: [facility] ENDO (2ND FLR);  Service: Endoscopy;  Laterality: N/A;    ENDOSCOPIC ULTRASOUND OF UPPER GASTROINTESTINAL TRACT N/A 7/24/2024    Procedure: ULTRASOUND, UPPER GI TRACT, ENDOSCOPIC;  Surgeon: Faustino Trujillo MD;  Location: [facility] ENDO (2ND FLR);  Service: Endoscopy;  Laterality: N/A;    ERCP N/A 10/23/2023    Procedure: ERCP (ENDOSCOPIC RETROGRADE CHOLANGIOPANCREATOGRAPHY);  Surgeon: Emil Villatoro MD;  Location: [facility] ENDO (2ND FLR);  Service: Endoscopy;  Laterality: N/A;    ESOPHAGOGASTRODUODENOSCOPY N/A 9/18/2023    Procedure: EGD (ESOPHAGOGASTRODUODENOSCOPY);  Surgeon: Kg Cleaning MD;  Location: [facility] ENDO (2ND FLR);  Service: Endoscopy;  Laterality: N/A;    ESOPHAGOGASTRODUODENOSCOPY N/A 3/8/2024    Procedure: EGD (ESOPHAGOGASTRODUODENOSCOPY);  Surgeon: Greg Love MD;  Location: [facility] ENDO (2ND FLR);  Service: Endoscopy;  Laterality: N/A;    ESOPHAGOGASTRODUODENOSCOPY N/A 7/24/2024    Procedure: EGD (ESOPHAGOGASTRODUODENOSCOPY);  Surgeon: Faustino Trujillo MD;  Location: [facility] ENDO (2ND FLR);  Service: Endoscopy;  Laterality: N/A;     Family History   Problem Relation Name Age of Onset    Pancreatitis Mother      Cancer Mother      Ovarian cancer Mother      No Known Problems Father      Cancer Brother      Breast cancer Maternal Grandmother       Social History[1]      Physical Exam     Initial Vitals [04/17/25 2219]   BP Pulse Resp Temp SpO2   121/85 92 18 98.1 °F (36.7 °C) 99 %      MAP       --         Physical Exam    Nursing note and vitals  reviewed.  Constitutional: He appears well-developed. No distress.   HENT:   Head: Normocephalic and atraumatic.   Nose: Nose normal.   Eyes: EOM are normal.   Neck: Neck supple. No tracheal deviation present. No JVD present.   Cardiovascular:  Normal rate, regular rhythm, normal heart sounds and intact distal pulses.     Exam reveals no gallop and no friction rub.       No murmur heard.  Pulmonary/Chest: Breath sounds normal. No respiratory distress. He has no wheezes. He has no rhonchi. He has no rales.   Abdominal: Abdomen is soft. Bowel sounds are normal. He exhibits no distension. There is abdominal tenderness (Diffuse, worse in epigastric region).   No bruising or crepitus.    No right CVA tenderness.  No left CVA tenderness. There is no rebound and no guarding.   Musculoskeletal:         General: Normal range of motion.      Cervical back: Neck supple.     Neurological: He is alert and oriented to person, place, and time. He has normal strength. No cranial nerve deficit or sensory deficit.   Skin: Skin is warm and dry. Capillary refill takes less than 2 seconds. No rash noted.   Psychiatric: He has a normal mood and affect.         ED Course   Procedures  Labs Reviewed   COMPREHENSIVE METABOLIC PANEL - Abnormal       Result Value    Sodium 145      Potassium 3.7      Chloride 111 (*)     CO2 21 (*)     Glucose 116 (*)     BUN 9      Creatinine 0.8      Calcium 7.9 (*)     Protein Total 7.7      Albumin 4.0      Bilirubin Total 0.3      ALP 98      AST 66 (*)     ALT 78 (*)     Anion Gap 13      eGFR >60     CBC WITH DIFFERENTIAL - Abnormal    WBC 7.04      RBC 5.32      HGB 13.0 (*)     HCT 41.5      MCV 78 (*)     MCH 24.4 (*)     MCHC 31.3 (*)     RDW 18.6 (*)     Platelet Count 325      MPV 9.3      Nucleated RBC 0      Neut % 42.1      Lymph % 46.4      Mono % 8.2      Eos % 1.7      Basophil % 1.3      Imm Grans % 0.3      Neut # 2.96      Lymph # 3.27      Mono # 0.58      Eos # 0.12      Baso # 0.09       Imm Grans # 0.02     LIPASE - Normal    Lipase Level 22     CBC W/ AUTO DIFFERENTIAL    Narrative:     The following orders were created for panel order CBC W/ AUTO DIFFERENTIAL.  Procedure                               Abnormality         Status                     ---------                               -----------         ------                     CBC with Differential[[id number]]       Abnormal            Final result                 Please view results for these tests on the individual orders.   URINALYSIS, REFLEX TO URINE CULTURE          Imaging Results              CT Abdomen Pelvis With IV Contrast NO Oral Contrast (Final result)  Result time 04/18/25 00:59:49      Final result by Kalyn Mir MD (04/18/25 00:59:49)                   Impression:      1. Mild peripancreatic inflammatory changes likely reflecting acute pancreatitis.  Inflammatory changes are however less pronounced from previous CT 03/29/2025.  2. Hepatosplenomegaly with suspected hepatic steatosis.  3. Additional findings as detailed above.      Electronically signed by: Kalyn Mir MD  Date:    04/18/2025  Time:    00:59               Narrative:    EXAMINATION:  CT ABDOMEN PELVIS WITH IV CONTRAST    CLINICAL HISTORY:  Pancreatitis, acute, severe;    TECHNIQUE:  Low dose axial images, sagittal and coronal reformations were obtained from the lung bases to the pubic symphysis following the IV administration of 100 mL of Omnipaque 350 .  Oral contrast was not given.    COMPARISON:  CT abdomen and pelvis 03/29/2025.    FINDINGS:  The visualized portion of the heart is unremarkable.  The lung bases are clear.    Liver is enlarged measuring 21 cm and diffusely hypoattenuating in appearance likely reflecting diffuse fatty infiltration.  Spleen is mildly enlarged measuring 13 cm.  Multiple left upper quadrant collateral vessels are visualized.  Portal vein and splenic vein are patent.  There is no intra-or extrahepatic biliary ductal  dilatation.  The gallbladder is unremarkable.  Mild peripancreatic inflammatory changes are seen.  No new pancreatic fluid collection.  Pancreatic parenchyma enhances normally.  Stomach and adrenal glands are unremarkable.    Kidneys enhance normally with no evidence of hydronephrosis.  No abnormalities are seen along the ureteral courses.  Urinary bladder and prostate are unremarkable.    Appendix is visualized and is unremarkable.  The visualized loops of small and large bowel show no evidence of obstruction or inflammation.  No free air or free fluid.    Aorta tapers normally.    No acute osseous abnormality identified. Subcutaneous soft tissues show no significant abnormalities.                                       Medications   ondansetron injection 4 mg (has no administration in time range)   HYDROmorphone injection 0.5 mg (has no administration in time range)   HYDROmorphone injection 1 mg (1 mg Intravenous Given 4/17/25 2352)   ondansetron injection 4 mg (4 mg Intravenous Given 4/17/25 2352)   sodium chloride 0.9% bolus 1,000 mL 1,000 mL (1,000 mLs Intravenous New Bag 4/18/25 0001)   iohexoL (OMNIPAQUE 350) injection 75 mL (75 mLs Intravenous Given 4/18/25 0013)     Medical Decision Making  Given history and exam I have a low suspicion for AAA, SBO, appendicitis, mesenteric ischemia, nephrolithiasis, pyelonephritis, or diverticulitis. I have a moderate concern for pancreatitis, gastritis vs non-bleeding peptic ulcer, or hepatobiliary disease and thus will obtain labs and imaging.    ED Workup: CBC, CMP, Lipase    No signs of severe pancreatitis on exam such as ecchymosis of periumbilical region or flanks, hypoxia, or tachypneia.    Disposition: Admit for bowel rest, continued analgesia, monitoring. CT pending at the time of admission.       Amount and/or Complexity of Data Reviewed  External Data Reviewed: notes.  Labs: ordered. Decision-making details documented in ED Course.  Radiology:  ordered.    Risk  Prescription drug management.            Scribe Attestation:   Scribe #1: I performed the above scribed service and the documentation accurately describes the services I performed. I attest to the accuracy of the note.  Scribe #2: I performed the above scribed service and the documentation accurately describes the services I performed. I attest to the accuracy of the note.        ED Course as of 04/18/25 0102 Fri Apr 18, 2025   0053 24 y.o. 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       Medical Decision Making:   Differential Diagnosis:   Differential Diagnosis includes, but is not limited to:  mesenteric ischemia, perforated viscous, SBO/volvulus, incarcerated/strangulated hernia, intussusception, ileus, appendicitis, cholecystitis, cholangitis, diverticulitis, esophagitis, hepatitis, nephrolithiasis, pancreatitis, gastroenteritis, colitis, IBD/IBS, biliary colic, GERD, PUD, constipation, UTI/pyelonephritis,  disorder.                 Clinical Impression:  Final diagnoses:  [K85.90] Pancreatitis          ED Disposition Condition    Observation                     [1]   Social History  Tobacco Use    Smoking status: Former     Types: Cigarettes    Smokeless tobacco: Never   Substance Use Topics    Alcohol use: Not Currently    Drug use: Yes     Types: Marijuana        Sander Hurd MD  04/18/25 0102

## 2025-04-18 NOTE — PLAN OF CARE
Problem: Fall Injury Risk  Goal: Absence of Fall and Fall-Related Injury  Outcome: Progressing     Problem: Pain Acute  Goal: Optimal Pain Control and Function  Outcome: Progressing     Problem: Adult Inpatient Plan of Care  Goal: Readiness for Transition of Care  Outcome: Progressing

## 2025-04-18 NOTE — HPI
The patient is a 24 y.o. 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 He states he went to ED here yesterday for same issue, and he believes his symptoms are part of a pancreatitis flare up.  CT positive for mild pancreatitis.  Will admit for further management of his acute on chronic pancreatitis.

## 2025-04-18 NOTE — PLAN OF CARE
Case Management Assessment     PCP: Pina Jones NP  Pharmacy:   [id number] - [place], [place] - [address] AT [facility] [address]  [address]  [facility] [address]  Phone: [phone] Fax: [phone]    [facility] [place] - [address]  [address] [place] [address]  Phone: [phone] Fax: [phone]    [facility]  [address]  [place] [address]  Phone: [phone] Fax: [phone]    [facility]  [address]  [place] [address]  Phone: [phone] Fax: [phone]    Patient Arrived From: Home  Existing Help at Home: None    Barriers to Discharge: None known at this time    Discharge Plan:    A. Home    B. Home      completed discharge assessment with pt. Pt lives alone. Demographics, PCP, and insurance verified. No home health. No dialysis. Pt completes ADLs without assistance. Pt to discharge home via family transport. Pt has no other needs to be addressed at this time.     [facility] ([address])  Initial Discharge Assessment       Primary Care Provider: iPna Jones NP    Admission Diagnosis: Pancreatitis [K85.90]  HIV infection, unspecified symptom status [Z21]    Admission Date: 4/17/2025  Expected Discharge Date:     Transition of Care Barriers: (P) None    Payor: MEDICAID / Plan: AETNA [employer] [facility] / Product Type: Managed Medicaid /     Extended Emergency Contact Information  Primary Emergency Contact: Denice Cardona  Mobile Phone: [phone]  Relation: Mother  Preferred language: English   needed? No    Discharge Plan A: (P) Home         [id number] - [place], [place] - [address] AT [facility] [place] & [address]  [address]  [facility] [address]  Phone: [phone] Fax: [phone]    [facility] [place]  - [address]  [address] [place] [address]  Phone: [phone] Fax: [phone]    [facility]  [address]  [facility] [address]  Phone: [phone] Fax: [phone]    [facility]  [address]  [facility] [address]  Phone: [phone] Fax: [phone]      Initial Assessment (most recent)       Adult Discharge Assessment - 04/18/25 1150          Discharge Assessment    Assessment Type Discharge Planning Assessment (P)      Confirmed/corrected address, phone number and insurance Yes (P)      Confirmed Demographics Correct on Facesheet (P)      Source of Information patient (P)      People in Home alone (P)      Do you expect to return to your current living situation? Yes (P)      Do you have help at home or someone to help you manage your care at home? No (P)      Prior to hospitilization cognitive status: Alert/Oriented (P)      Current cognitive status: Alert/Oriented (P)      Walking or Climbing Stairs Difficulty no (P)      Dressing/Bathing Difficulty no (P)      Equipment Currently Used at Home none (P)      Readmission within 30 days? Yes (P)      Patient currently being followed by outpatient case management? No (P)      Do you currently have service(s) that help you manage your care at home? No (P)      Do you take prescription medications? No (P)      Do you have prescription coverage? Yes (P)      Coverage MEDICAID - [employer] [facility] [place] (P)      Do you have any problems affording any of your prescribed medications? No (P)      Is the patient taking medications as prescribed? yes (P)      Who is going to help you get home at discharge? [employer]/Ly[employer] (P)      How do you get to doctors appointments? public transportation (P)      Are you on dialysis? No (P)      Do you take coumadin? No (P)      Discharge Plan A Home (P)      DME Needed Upon Discharge  none (P)      Discharge Plan discussed with: Patient (P)       Transition of Care Barriers None (P)

## 2025-04-19 ENCOUNTER — PATIENT MESSAGE (OUTPATIENT)
Dept: INFECTIOUS DISEASES | Facility: CLINIC | Age: 25
End: 2025-04-19
Payer: MEDICAID

## 2025-04-19 VITALS
DIASTOLIC BLOOD PRESSURE: 82 MMHG | WEIGHT: 172.38 LBS | SYSTOLIC BLOOD PRESSURE: 135 MMHG | BODY MASS INDEX: 24.68 KG/M2 | HEIGHT: 70 IN | OXYGEN SATURATION: 97 % | HEART RATE: 85 BPM | RESPIRATION RATE: 18 BRPM | TEMPERATURE: 98 F

## 2025-04-19 LAB
ABSOLUTE EOSINOPHIL (OHS): 0.1 K/UL
ABSOLUTE MONOCYTE (OHS): 0.48 K/UL (ref 0.3–1)
ABSOLUTE NEUTROPHIL COUNT (OHS): 1.91 K/UL (ref 1.8–7.7)
ALBUMIN SERPL BCP-MCNC: 3.3 G/DL (ref 3.5–5.2)
ALP SERPL-CCNC: 94 UNIT/L (ref 40–150)
ALT SERPL W/O P-5'-P-CCNC: 56 UNIT/L (ref 10–44)
ANION GAP (OHS): 12 MMOL/L (ref 8–16)
AST SERPL-CCNC: 54 UNIT/L (ref 11–45)
BASOPHILS # BLD AUTO: 0.03 K/UL
BASOPHILS NFR BLD AUTO: 0.6 %
BILIRUB SERPL-MCNC: 0.7 MG/DL (ref 0.1–1)
BUN SERPL-MCNC: 9 MG/DL (ref 6–20)
CALCIUM SERPL-MCNC: 8.3 MG/DL (ref 8.7–10.5)
CHLORIDE SERPL-SCNC: 101 MMOL/L (ref 95–110)
CO2 SERPL-SCNC: 22 MMOL/L (ref 23–29)
CREAT SERPL-MCNC: 0.8 MG/DL (ref 0.5–1.4)
ERYTHROCYTE [DISTWIDTH] IN BLOOD BY AUTOMATED COUNT: 18 % (ref 11.5–14.5)
GFR SERPLBLD CREATININE-BSD FMLA CKD-EPI: >60 ML/MIN/1.73/M2
GLUCOSE SERPL-MCNC: 103 MG/DL (ref 70–110)
HCT VFR BLD AUTO: 36.3 % (ref 40–54)
HGB BLD-MCNC: 10.8 GM/DL (ref 14–18)
IMM GRANULOCYTES # BLD AUTO: 0.01 K/UL (ref 0–0.04)
IMM GRANULOCYTES NFR BLD AUTO: 0.2 % (ref 0–0.5)
LYMPHOCYTES # BLD AUTO: 2.1 K/UL (ref 1–4.8)
MAGNESIUM SERPL-MCNC: 1.6 MG/DL (ref 1.6–2.6)
MCH RBC QN AUTO: 23.7 PG (ref 27–31)
MCHC RBC AUTO-ENTMCNC: 29.8 G/DL (ref 32–36)
MCV RBC AUTO: 80 FL (ref 82–98)
NUCLEATED RBC (/100WBC) (OHS): 0 /100 WBC
PHOSPHATE SERPL-MCNC: 4.4 MG/DL (ref 2.7–4.5)
PLATELET # BLD AUTO: 204 K/UL (ref 150–450)
PMV BLD AUTO: 10.2 FL (ref 9.2–12.9)
POTASSIUM SERPL-SCNC: 3.9 MMOL/L (ref 3.5–5.1)
PROT SERPL-MCNC: 6.4 GM/DL (ref 6–8.4)
RBC # BLD AUTO: 4.55 M/UL (ref 4.6–6.2)
RELATIVE EOSINOPHIL (OHS): 2.2 %
RELATIVE LYMPHOCYTE (OHS): 45.4 % (ref 18–48)
RELATIVE MONOCYTE (OHS): 10.4 % (ref 4–15)
RELATIVE NEUTROPHIL (OHS): 41.2 % (ref 38–73)
SODIUM SERPL-SCNC: 135 MMOL/L (ref 136–145)
WBC # BLD AUTO: 4.63 K/UL (ref 3.9–12.7)

## 2025-04-19 PROCEDURE — 83735 ASSAY OF MAGNESIUM: CPT | Performed by: NURSE PRACTITIONER

## 2025-04-19 PROCEDURE — 63600175 PHARM REV CODE 636 W HCPCS: Performed by: NURSE PRACTITIONER

## 2025-04-19 PROCEDURE — 36415 COLL VENOUS BLD VENIPUNCTURE: CPT | Performed by: NURSE PRACTITIONER

## 2025-04-19 PROCEDURE — 84100 ASSAY OF PHOSPHORUS: CPT | Performed by: NURSE PRACTITIONER

## 2025-04-19 PROCEDURE — 85025 COMPLETE CBC W/AUTO DIFF WBC: CPT | Performed by: NURSE PRACTITIONER

## 2025-04-19 PROCEDURE — 25000003 PHARM REV CODE 250: Performed by: NURSE PRACTITIONER

## 2025-04-19 PROCEDURE — 80053 COMPREHEN METABOLIC PANEL: CPT | Performed by: NURSE PRACTITIONER

## 2025-04-19 PROCEDURE — 63600175 PHARM REV CODE 636 W HCPCS: Performed by: INTERNAL MEDICINE

## 2025-04-19 PROCEDURE — 96376 TX/PRO/DX INJ SAME DRUG ADON: CPT

## 2025-04-19 PROCEDURE — G0378 HOSPITAL OBSERVATION PER HR: HCPCS

## 2025-04-19 PROCEDURE — 96361 HYDRATE IV INFUSION ADD-ON: CPT

## 2025-04-19 RX ORDER — HYDROMORPHONE HYDROCHLORIDE 2 MG/ML
2 INJECTION, SOLUTION INTRAMUSCULAR; INTRAVENOUS; SUBCUTANEOUS ONCE
Status: COMPLETED | OUTPATIENT
Start: 2025-04-19 | End: 2025-04-19

## 2025-04-19 RX ORDER — OXYCODONE AND ACETAMINOPHEN 10; 325 MG/1; MG/1
1 TABLET ORAL EVERY 6 HOURS PRN
Qty: 20 TABLET | Refills: 0 | Status: SHIPPED | OUTPATIENT
Start: 2025-04-19 | End: 2025-04-22 | Stop reason: SDUPTHER

## 2025-04-19 RX ORDER — ONDANSETRON 4 MG/1
4 TABLET, ORALLY DISINTEGRATING ORAL EVERY 8 HOURS PRN
Qty: 12 TABLET | Refills: 0 | Status: SHIPPED | OUTPATIENT
Start: 2025-04-19 | End: 2025-04-22 | Stop reason: SDUPTHER

## 2025-04-19 RX ADMIN — HYDROMORPHONE HYDROCHLORIDE 2 MG: 2 INJECTION INTRAMUSCULAR; INTRAVENOUS; SUBCUTANEOUS at 02:04

## 2025-04-19 RX ADMIN — ONDANSETRON 4 MG: 2 INJECTION INTRAMUSCULAR; INTRAVENOUS at 08:04

## 2025-04-19 RX ADMIN — HYDROMORPHONE HYDROCHLORIDE 2 MG: 2 INJECTION INTRAMUSCULAR; INTRAVENOUS; SUBCUTANEOUS at 06:04

## 2025-04-19 RX ADMIN — HYDROMORPHONE HYDROCHLORIDE 2 MG: 2 INJECTION INTRAMUSCULAR; INTRAVENOUS; SUBCUTANEOUS at 03:04

## 2025-04-19 RX ADMIN — ONDANSETRON 4 MG: 2 INJECTION INTRAMUSCULAR; INTRAVENOUS at 03:04

## 2025-04-19 RX ADMIN — BICTEGRAVIR SODIUM, EMTRICITABINE, AND TENOFOVIR ALAFENAMIDE FUMARATE 1 TABLET: 50; 200; 25 TABLET ORAL at 08:04

## 2025-04-19 RX ADMIN — HYDROMORPHONE HYDROCHLORIDE 2 MG: 2 INJECTION INTRAMUSCULAR; INTRAVENOUS; SUBCUTANEOUS at 08:04

## 2025-04-19 RX ADMIN — HYDROMORPHONE HYDROCHLORIDE 2 MG: 2 INJECTION INTRAMUSCULAR; INTRAVENOUS; SUBCUTANEOUS at 01:04

## 2025-04-19 NOTE — PLAN OF CARE
Problem: Fall Injury Risk  Goal: Absence of Fall and Fall-Related Injury  Outcome: Met     Problem: Pain Acute  Goal: Optimal Pain Control and Function  Outcome: Met     Problem: Adult Inpatient Plan of Care  Goal: Readiness for Transition of Care  Outcome: Met     Problem: Adult Inpatient Plan of Care  Goal: Optimal Comfort and Wellbeing  Outcome: Met

## 2025-04-19 NOTE — PROGRESS NOTES
[facility] Surg [address] Medicine  Progress Note    Patient Name: Tasia Cardona  MRN: [id number]  Patient Class: OP- Observation   Admission Date: 4/17/2025  Length of Stay: 0 days  Attending Physician: Eros Mera MD  Primary Care Provider: Pina Jones NP        Subjective     Principal Problem:Acute on chronic pancreatitis        HPI:  The patient is a 24 y.o. 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 He states he went to ED here yesterday for same issue, and he believes his symptoms are part of a pancreatitis flare up.  CT positive for mild pancreatitis.  Will admit for further management of his acute on chronic pancreatitis.      Overview/Hospital Course:  Admitted for alcohol pancreatitis, came in with elevated Alc levels. CT of the abdomen showed mild pancreatic inflammation without further pathology. Made NPO with pain control, IV fluids and electrolyte replacement. Trial soft diet 4/18.    Interval History: still with similar abd pain and nausea. Trying to eat breakfast but limited by nausea       Review of Systems   Constitutional:  Positive for appetite change. Negative for activity change, fatigue and fever.   Respiratory:  Negative for apnea, shortness of breath and wheezing.    Cardiovascular:  Negative for chest pain and leg swelling.   Gastrointestinal:  Positive for abdominal pain, nausea and vomiting. Negative for abdominal distention.   Genitourinary:  Negative for difficulty urinating, flank pain and hematuria.   Skin:  Negative for pallor and rash.   Neurological:  Negative for dizziness, speech difficulty, light-headedness and headaches.     Objective:     Vital Signs (Most Recent):  Temp: 98.1 °F (36.7 °C) (04/19/25  0808)  Pulse: 76 (04/19/25 0808)  Resp: (P) 18 (04/19/25 0859)  BP: (!) 138/93 (04/19/25 0808)  SpO2: 97 % (04/19/25 0808) Vital Signs (24h Range):  Temp:  [98.1 °F (36.7 °C)-99.4 °F (37.4 °C)] 98.1 °F (36.7 °C)  Pulse:  [76-99] 76  Resp:  [16-20] (P) 18  SpO2:  [96 %-97 %] 97 %  BP: (124-138)/(81-93) 138/93     Weight: 78.2 kg (172 lb 6.4 oz)  Body mass index is 24.74 kg/m².    Intake/Output Summary (Last 24 hours) at 4/19/2025 0916  Last data filed at 4/19/2025 0549  Gross per 24 hour   Intake 4696.55 ml   Output --   Net 4696.55 ml         Physical Exam  Constitutional:       Appearance: Normal appearance. He is well-developed.   HENT:      Head: Normocephalic.   Eyes:      Conjunctiva/sclera: Conjunctivae normal.   Cardiovascular:      Rate and Rhythm: Normal rate and regular rhythm.      Pulses:           Radial pulses are 2+ on the right side and 2+ on the left side.      Heart sounds: Normal heart sounds.   Pulmonary:      Effort: Pulmonary effort is normal.      Breath sounds: Normal breath sounds.   Abdominal:      General: Bowel sounds are increased. There is distension.      Palpations: Abdomen is soft.      Tenderness: There is abdominal tenderness in the right upper quadrant and epigastric area.   Musculoskeletal:         General: Normal range of motion.      Cervical back: Normal range of motion and neck supple.   Skin:     General: Skin is warm and dry.   Neurological:      Mental Status: He is alert and oriented to person, place, and time.      GCS: GCS eye subscore is 4. GCS verbal subscore is 5. GCS motor subscore is 6.      Motor: Motor function is intact.   Psychiatric:         Mood and Affect: Mood normal.         Speech: Speech normal.         Behavior: Behavior normal.               Significant Labs: All pertinent labs within the past 24 hours have been reviewed.    Significant Imaging: I have reviewed all pertinent imaging results/findings within the past 24 hours.      Assessment &  Plan  Acute on chronic pancreatitis  CT- Mild peripancreatic inflammatory changes likely reflecting acute pancreatitis.  Inflammatory changes are however less pronounced from previous CT 03/29/2025.  Hepatosplenomegaly with suspected hepatic steatosis.    Will start some soft diet to see if he tolerates. Likely ok to discharge in AM if he is able to eat ok today.  IVF  Dilaudid  Zofran    HIV infection  Continue Biktarvy    VTE Risk Mitigation (From admission, onward)           Ordered     enoxaparin injection 40 mg  Daily         04/18/25 0123     IP VTE HIGH RISK PATIENT  Once         04/18/25 0123     Place sequential compression device  Until discontinued         04/18/25 0123                    Discharge Planning   CASTILLO:      Code Status: Full Code   Medical Readiness for Discharge Date:   Discharge Plan A: Home          Continue supportive care.  Home when consistently tolerates po              Eros Mera MD  Department of Hospital Medicine   [facility] ([address])

## 2025-04-19 NOTE — DISCHARGE SUMMARY
[facility] [address] Medicine  Discharge Summary      Patient Name: Tasia Cardona  MRN: [id number]  HEATHER: [id number]  Patient Class: OP- Observation  Admission Date: 4/17/2025  Hospital Length of Stay: 0 days  Discharge Date and Time: 04/19/2025 2:44 PM  Attending Physician: Eros Mera MD   Discharging Provider: Eros Mera MD  Primary Care Provider: Pina Jones NP    Primary Care Team: Networked reference to record PCT     HPI:   The patient is a 24 y.o. 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 He states he went to ED here yesterday for same issue, and he believes his symptoms are part of a pancreatitis flare up.  CT positive for mild pancreatitis.  Will admit for further management of his acute on chronic pancreatitis.      * No surgery found *      Hospital Course:   Admitted for alcohol pancreatitis. CT of the abdomen showed mild pancreatic inflammation without further pathology. Made NPO with pain control, antiemetics, IV fluids and electrolyte replacement.     Slowly improved to point he was tolerating diet.         Goals of Care Treatment Preferences:  Code Status: Full Code         Consults:   Consults (From admission, onward)          Status Ordering Provider     Inpatient consult to Gastroenterology  Once        Provider:  Kg Mcdaniels MD    Completed ANGIE RICHMOND            Assessment & Plan  Acute on chronic pancreatitis  CT- Mild peripancreatic inflammatory changes likely reflecting acute pancreatitis.  Inflammatory changes are however less pronounced from previous CT 03/29/2025.  Hepatosplenomegaly with suspected hepatic steatosis.    Will start some soft diet to see if he tolerates. Likely ok to  discharge in AM if he is able to eat ok today.  IVF  Dilaudid  Zofran    HIV infection  Continue Biktarvy    Final Active Diagnoses:    Diagnosis Date Noted POA    PRINCIPAL PROBLEM:  Acute on chronic pancreatitis [K85.90, K86.1] 12/15/2023 Yes    HIV infection [Z21] 11/02/2021 Yes      Problems Resolved During this Admission:       Discharged Condition: good    Disposition: Home or Self Care    Follow Up:    Patient Instructions:      Diet Adult Regular     Activity as tolerated       Significant Diagnostic Studies: Labs: CMP   Recent Labs   Lab 04/17/25  2234 04/18/25  0807 04/19/25  0358    142 135*   K 3.7 3.9 3.9   * 107 101   CO2 21* 25 22*   BUN 9 9 9   CREATININE 0.8 0.7 0.8   CALCIUM 7.9* 7.7* 8.3*   ALBUMIN 4.0 3.4* 3.3*   BILITOT 0.3 0.2 0.7   ALKPHOS 98 90 94   AST 66* 52* 54*   ALT 78* 63* 56*   ANIONGAP 13 10 12       Pending Diagnostic Studies:       Procedure Component Value Units Date/Time    Urinalysis, Reflex to Urine Culture Urine, Clean Catch [[id number]]     Order Status: Sent Lab Status: No result     Specimen: Urine            Medications:  Reconciled Home Medications:      Medication List        START taking these medications      ondansetron 4 MG Tbdl  Commonly known as: ZOFRAN-ODT  Take 1 tablet (4 mg total) by mouth every 8 (eight) hours as needed (nausea/vomiting).     oxyCODONE-acetaminophen  mg per tablet  Commonly known as: PERCOCET  Take 1 tablet by mouth every 6 (six) hours as needed for Pain.            CHANGE how you take these medications      rzjhpdlhv-ucwlssoj-fvlbyrs ala -25 mg (25 kg or greater)  Commonly known as: BIKTARVY  Take 1 tablet by mouth once daily.  What changed: Another medication with the same name was removed. Continue taking this medication, and follow the directions you see here.            CONTINUE taking these medications      acetaminophen 500 MG tablet  Commonly known as: TYLENOL  Take 2 tablets (1,000 mg total) by mouth every 8  (eight) hours as needed for Pain.     STOOL SOFTENER-LAXATIVE 8.6-50 mg per tablet  Generic drug: senna-docusate  Take 1 tablet by mouth 2 (two) times daily.            STOP taking these medications      promethazine 12.5 MG Tab  Commonly known as: PHENERGAN              Indwelling Lines/Drains at time of discharge:   Lines/Drains/Airways       None                   Time spent on the discharge of patient: 30 minutes         Eros Mera MD  Department of Hospital Medicine  [facility] [place] - [facility] Surg ([address])

## 2025-04-19 NOTE — CONSULTS
[facility] Surg ([address])  Gastroenterology  Consult Note    Patient Name: Tasia Cardona  MRN: [id number]  Admission Date: 4/17/2025  Hospital Length of Stay: 0 days  Code Status: Full Code   Primary Care Physician: Pina Jones NP  Principal Problem:Acute on chronic pancreatitis    Inpatient consult to Gastroenterology  Consult performed by: Kg Mcdaniels MD  Consult ordered by: Mauro Duke NP        Subjective:     Chief complaint:  Abdominal pain    HPI:  24-year-old man who came to the emergency room complaining of upper abdominal pain that had started 2 days prior.  This was associated with nausea and vomiting.  He says the symptoms feel like his previous pancreatitis.  He says his alcohol intake is infrequent.  He has been followed by GI at [facility].  This morning, he is still having pain but says it has improved some.  He is able to eat a little.    He has had several episodes of pancreatitis.  His 1st episode was severe with necrosis.  He has a history of polycythemia vera which has been complicated with thrombosis including a splenic vein thrombosis.  He also has a history of HIV on HAART.  He tells me his viral load is undetectable.    Past medical history:  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       Past surgical history:  Past Surgical History:   Procedure Laterality Date    ENDOSCOPIC ULTRASOUND OF UPPER GASTROINTESTINAL TRACT N/A 10/23/2023    Procedure: ULTRASOUND, UPPER GI TRACT, ENDOSCOPIC;  Surgeon: Irving  Emil HADLEY MD;  Location: [facility] (2ND FLR);  Service: Endoscopy;  Laterality: N/A;    ENDOSCOPIC ULTRASOUND OF UPPER GASTROINTESTINAL TRACT N/A 7/24/2024    Procedure: ULTRASOUND, UPPER GI TRACT, ENDOSCOPIC;  Surgeon: Faustino Trujillo MD;  Location: [facility] (2ND FLR);  Service: Endoscopy;  Laterality: N/A;    ERCP N/A 10/23/2023    Procedure: ERCP (ENDOSCOPIC RETROGRADE CHOLANGIOPANCREATOGRAPHY);  Surgeon: Emil Villatoro MD;  Location: [facility] (2ND FLR);  Service: Endoscopy;  Laterality: N/A;    ESOPHAGOGASTRODUODENOSCOPY N/A 9/18/2023    Procedure: EGD (ESOPHAGOGASTRODUODENOSCOPY);  Surgeon: Kg Cleaning MD;  Location: [facility] (2ND FLR);  Service: Endoscopy;  Laterality: N/A;    ESOPHAGOGASTRODUODENOSCOPY N/A 3/8/2024    Procedure: EGD (ESOPHAGOGASTRODUODENOSCOPY);  Surgeon: Greg Love MD;  Location: [facility] (2ND FLR);  Service: Endoscopy;  Laterality: N/A;    ESOPHAGOGASTRODUODENOSCOPY N/A 7/24/2024    Procedure: EGD (ESOPHAGOGASTRODUODENOSCOPY);  Surgeon: Faustino Trujillo MD;  Location: [facility] (2ND FLR);  Service: Endoscopy;  Laterality: N/A;       Social history:  Social History     Socioeconomic History    Marital status: Single   Tobacco Use    Smoking status: Former     Types: Cigarettes    Smokeless tobacco: Never   Substance and Sexual Activity    Alcohol use: Not Currently    Drug use: Yes     Types: Marijuana     Social Drivers of Health     Financial Resource Strain: Low Risk  (4/5/2025)    Overall Financial Resource Strain (CARDIA)     Difficulty of Paying Living Expenses: Not very hard   Food Insecurity: No Food Insecurity (4/5/2025)    Hunger Vital Sign     Worried About Running Out of Food in the Last Year: Never true     Ran Out of Food in the Last Year: Never true   Transportation Needs: No Transportation Needs (3/30/2025)    PRAPARE - Transportation     Lack of Transportation (Medical): No     Lack of Transportation (Non-Medical): No   Physical Activity: Inactive  (3/30/2025)    Exercise Vital Sign     Days of Exercise per Week: 0 days     Minutes of Exercise per Session: 0 min   Stress: No Stress Concern Present (4/5/2025)    [employer] - Occupational Stress Questionnaire     Feeling of Stress : Only a little   Housing Stability: Low Risk  (4/5/2025)    Housing Stability Vital Sign     Unable to Pay for Housing in the Last Year: No     Homeless in the Last Year: No       Family history:  Family History   Problem Relation Name Age of Onset    Pancreatitis Mother      Cancer Mother      Ovarian cancer Mother      No Known Problems Father      Cancer Brother      Breast cancer Maternal Grandmother         Medications:  Prescriptions Prior to Admission[1]    Allergies:  Review of patient's allergies indicates:   Allergen Reactions    [place] Swelling    Droperidol Hives    Pecan nut Swelling    Penicillins Hives     Tolerates cephalosporins    Tolerated piperacillin/tazobactam 11/2023    Sulfa (sulfonamide antibiotics) Hives    Opioids - morphine analogues Itching, Nausea And Vomiting and Rash    Tolonium chloride(toluidine blue-o) Hives, Itching and Rash    Toradol [ketorolac] Rash       Review of systems:  CONSTITUTIONAL: Negative for fever, chills, weakness, weight loss, weight gain.  CARDIOVASCULAR: Negative for chest pain or palpitations.  RESPIRATORY: Negative for SOB or cough.  GASTROINTESTINAL: See HPI  GENITOURINARY: Negative for dysuria or hematuria.  MUSCULOSKELETAL: Negative for joint or muscle pain.  NEUROLOGIC: Negative for headaches  Aside from above positives, complete 10 point review of systems negative.    Objective:     Vital Signs (Most Recent):  Temp: 98.1 °F (36.7 °C) (04/19/25 0808)  Pulse: 76 (04/19/25 0808)  Resp: 18 (04/19/25 0859)  BP: (!) 138/93 (04/19/25 0808)  SpO2: 97 % (04/19/25 0808) Vital Signs (24h Range):  Temp:  [98.1 °F (36.7 °C)-99.4 °F (37.4 °C)] 98.1 °F (36.7 °C)  Pulse:  [76-99] 76  Resp:  [16-20] 18  SpO2:   "[96 %-97 %] 97 %  BP: (124-138)/(81-93) 138/93     Physical examination:  General: well developed, well nourished, no apparent distress  HENT: NCAT, hearing grossly intact  Eyes:  anicteric sclera  Cardiovascular: Regular rate and rhythm.   Lungs: Non-labored respirations. Breath sounds equal.   Abdomen: soft, NTND, normoactive BS  Extremities: No C/C/E  Neuro: AA&O x 3, no tremors  Psych: Appropriate mood and affect.   Skin: No jaundice  Musculoskeletal: no deformity    Labs:  CBC:   Recent Labs   Lab 04/17/25  2234 04/18/25  0807 04/19/25  0358   WBC 7.04 6.86 4.63   HGB 13.0* 12.3* 10.8*   HCT 41.5 39.8* 36.3*    295 204     CMP:   Recent Labs   Lab 04/19/25  0358   CALCIUM 8.3*   ALBUMIN 3.3*   *   K 3.9   CO2 22*      BUN 9   CREATININE 0.8   ALKPHOS 94   ALT 56*   AST 54*   BILITOT 0.7     Coagulation: No results for input(s): "PT", "INR", "APTT" in the last 48 hours.  Lipase:   Recent Labs   Lab 04/17/25  2234   LIPASE 22       Imaging:  CT Impression:     1. Mild peripancreatic inflammatory changes likely reflecting acute pancreatitis.  Inflammatory changes are however less pronounced from previous CT 03/29/2025.  2. Hepatosplenomegaly with suspected hepatic steatosis.      Assessment:   24-year-old man with recurrent acute pancreatitis.  His lipase is normal, but he says that it often is.  Symptoms and imaging are consistent with pancreatitis.  Fortunately, he is improving slowly.    History of HIV on HAART  Polycythemia vera treated with phlebotomy    Plan:   1. Continue IV fluids  2. Advance diet as tolerated   3. Pain control and antiemetics as you are doing   4. Follow-up with GI at [facility] after discharge        Thank you for your consult.     Kg Mcdaniels MD  Gastroenterology  [facility] - [facility] Surg ([address])        [1]   Medications Prior to Admission   Medication Sig Dispense Refill Last Dose/Taking    acetaminophen (TYLENOL) 500 MG tablet Take 2 tablets (1,000 mg total) by mouth " every 8 (eight) hours as needed for Pain.       [] hxerecuhs-mecdxnlq-dfoqmeh ala (BIKTARVY) -25 mg (25 kg or greater) Take 1 tablet by mouth once daily. 30 tablet 1     promethazine (PHENERGAN) 12.5 MG Tab Take 12.5 mg by mouth 4 (four) times daily.       senna-docusate (PERICOLACE) 8.6-50 mg per tablet Take 1 tablet by mouth 2 (two) times daily. 60 tablet 0

## 2025-04-19 NOTE — SUBJECTIVE & OBJECTIVE
Interval History: still with similar abd pain and nausea. Trying to eat breakfast but limited by nausea       Review of Systems   Constitutional:  Positive for appetite change. Negative for activity change, fatigue and fever.   Respiratory:  Negative for apnea, shortness of breath and wheezing.    Cardiovascular:  Negative for chest pain and leg swelling.   Gastrointestinal:  Positive for abdominal pain, nausea and vomiting. Negative for abdominal distention.   Genitourinary:  Negative for difficulty urinating, flank pain and hematuria.   Skin:  Negative for pallor and rash.   Neurological:  Negative for dizziness, speech difficulty, light-headedness and headaches.     Objective:     Vital Signs (Most Recent):  Temp: 98.1 °F (36.7 °C) (04/19/25 0808)  Pulse: 76 (04/19/25 0808)  Resp: (P) 18 (04/19/25 0859)  BP: (!) 138/93 (04/19/25 0808)  SpO2: 97 % (04/19/25 0808) Vital Signs (24h Range):  Temp:  [98.1 °F (36.7 °C)-99.4 °F (37.4 °C)] 98.1 °F (36.7 °C)  Pulse:  [76-99] 76  Resp:  [16-20] (P) 18  SpO2:  [96 %-97 %] 97 %  BP: (124-138)/(81-93) 138/93     Weight: 78.2 kg (172 lb 6.4 oz)  Body mass index is 24.74 kg/m².    Intake/Output Summary (Last 24 hours) at 4/19/2025 0916  Last data filed at 4/19/2025 0549  Gross per 24 hour   Intake 4696.55 ml   Output --   Net 4696.55 ml         Physical Exam  Constitutional:       Appearance: Normal appearance. He is well-developed.   HENT:      Head: Normocephalic.   Eyes:      Conjunctiva/sclera: Conjunctivae normal.   Cardiovascular:      Rate and Rhythm: Normal rate and regular rhythm.      Pulses:           Radial pulses are 2+ on the right side and 2+ on the left side.      Heart sounds: Normal heart sounds.   Pulmonary:      Effort: Pulmonary effort is normal.      Breath sounds: Normal breath sounds.   Abdominal:      General: Bowel sounds are increased. There is distension.      Palpations: Abdomen is soft.      Tenderness: There is abdominal tenderness in the right  upper quadrant and epigastric area.   Musculoskeletal:         General: Normal range of motion.      Cervical back: Normal range of motion and neck supple.   Skin:     General: Skin is warm and dry.   Neurological:      Mental Status: He is alert and oriented to person, place, and time.      GCS: GCS eye subscore is 4. GCS verbal subscore is 5. GCS motor subscore is 6.      Motor: Motor function is intact.   Psychiatric:         Mood and Affect: Mood normal.         Speech: Speech normal.         Behavior: Behavior normal.               Significant Labs: All pertinent labs within the past 24 hours have been reviewed.    Significant Imaging: I have reviewed all pertinent imaging results/findings within the past 24 hours.

## 2025-04-19 NOTE — PLAN OF CARE
Case Management Final Discharge Note    Discharge Disposition: Home    New DME ordered / company name: None    Relevant SDOH / Transition of Care Barriers:  None    Person available to provide assistance at home when needed and their contact information: Self    Scheduled followup appointment: Pt advised to follow up with PCP on AVS    Referrals placed: None    Transportation: Family        Patient and family educated on discharge services and updated on DC plan. Bedside RN notified, patient clear to discharge from Case Management Perspective.      [facility] - Med Surg ([address])  Discharge Final Note    Primary Care Provider: Pina Jones NP    Expected Discharge Date: 4/19/2025    Final Discharge Note (most recent)       Final Note - 04/19/25 1525          Final Note    Assessment Type Final Discharge Note     Anticipated Discharge Disposition Home or Self Care     What phone number can be called within the next 1-3 days to see how you are doing after discharge? [phone]     Hospital Resources/Appts/Education Provided Provided patient/caregiver with written discharge plan information;Appointments scheduled and added to AVS        Post-Acute Status    Discharge Delays None known at this time                     Important Message from Medicare             Contact Info       Pina Jones NP   Specialty: Family Medicine   Relationship: PCP - General    [address]  [place] [address]   Phone: [phone]       Next Steps: Schedule an appointment as soon as possible for a visit in 1 week(s)    Instructions: Hospital follow up with PCP

## 2025-04-19 NOTE — CARE UPDATE
Tolerated lunch, still with some pain and nausea controlled by meds.  He wants to be discharged as he feels he can manage at home with po meds    PDM website reviewed.  Last rx for narcotics was 4/7/25 oxy 5mg #9  All recent narcotic rx have been given after ED visits or hospitalizations

## 2025-04-19 NOTE — NURSING
Nurse attempted to educate patient about tele monitoring. Patient refuses telemetry  monitoring despite education.

## 2025-04-20 ENCOUNTER — HOSPITAL ENCOUNTER (EMERGENCY)
Facility: OTHER | Age: 25
Discharge: HOME | End: 2025-04-21
Attending: EMERGENCY MEDICINE
Payer: MEDICAID

## 2025-04-20 DIAGNOSIS — K86.0 ALCOHOL-INDUCED CHRONIC PANCREATITIS: ICD-10-CM

## 2025-04-20 DIAGNOSIS — R10.12 LEFT UPPER QUADRANT ABDOMINAL PAIN: Primary | ICD-10-CM

## 2025-04-20 PROCEDURE — 99284 EMERGENCY DEPT VISIT MOD MDM: CPT | Mod: 25

## 2025-04-21 ENCOUNTER — TELEPHONE (OUTPATIENT)
Dept: INFECTIOUS DISEASES | Facility: CLINIC | Age: 25
End: 2025-04-21
Payer: MEDICAID

## 2025-04-21 VITALS
OXYGEN SATURATION: 99 % | HEART RATE: 85 BPM | BODY MASS INDEX: 24.34 KG/M2 | HEIGHT: 70 IN | SYSTOLIC BLOOD PRESSURE: 121 MMHG | DIASTOLIC BLOOD PRESSURE: 85 MMHG | RESPIRATION RATE: 15 BRPM | TEMPERATURE: 98 F | WEIGHT: 170 LBS

## 2025-04-21 DIAGNOSIS — Z21 ASYMPTOMATIC HIV INFECTION, WITH NO HISTORY OF HIV-RELATED ILLNESS: Primary | ICD-10-CM

## 2025-04-21 LAB
ABSOLUTE EOSINOPHIL (OHS): 0.16 K/UL
ABSOLUTE MONOCYTE (OHS): 0.52 K/UL (ref 0.3–1)
ABSOLUTE NEUTROPHIL COUNT (OHS): 1.98 K/UL (ref 1.8–7.7)
ALBUMIN SERPL BCP-MCNC: 3.8 G/DL (ref 3.5–5.2)
ALP SERPL-CCNC: 100 UNIT/L (ref 40–150)
ALT SERPL W/O P-5'-P-CCNC: 66 UNIT/L (ref 10–44)
ANION GAP (OHS): 12 MMOL/L (ref 8–16)
AST SERPL-CCNC: 96 UNIT/L (ref 11–45)
BASOPHILS # BLD AUTO: 0.03 K/UL
BASOPHILS NFR BLD AUTO: 0.5 %
BILIRUB SERPL-MCNC: 0.2 MG/DL (ref 0.1–1)
BUN SERPL-MCNC: 6 MG/DL (ref 6–20)
CALCIUM SERPL-MCNC: 8.4 MG/DL (ref 8.7–10.5)
CHLORIDE SERPL-SCNC: 108 MMOL/L (ref 95–110)
CK SERPL-CCNC: 86 U/L (ref 20–200)
CO2 SERPL-SCNC: 21 MMOL/L (ref 23–29)
CREAT SERPL-MCNC: 0.8 MG/DL (ref 0.5–1.4)
ERYTHROCYTE [DISTWIDTH] IN BLOOD BY AUTOMATED COUNT: 18.6 % (ref 11.5–14.5)
ETHANOL SERPL-MCNC: 198 MG/DL
GFR SERPLBLD CREATININE-BSD FMLA CKD-EPI: >60 ML/MIN/1.73/M2
GLUCOSE SERPL-MCNC: 128 MG/DL (ref 70–110)
HCT VFR BLD AUTO: 39.1 % (ref 40–54)
HGB BLD-MCNC: 12 GM/DL (ref 14–18)
IMM GRANULOCYTES # BLD AUTO: 0.01 K/UL (ref 0–0.04)
IMM GRANULOCYTES NFR BLD AUTO: 0.2 % (ref 0–0.5)
LIPASE SERPL-CCNC: 19 U/L (ref 4–60)
LYMPHOCYTES # BLD AUTO: 2.88 K/UL (ref 1–4.8)
MAGNESIUM SERPL-MCNC: 2 MG/DL (ref 1.6–2.6)
MCH RBC QN AUTO: 24.2 PG (ref 27–31)
MCHC RBC AUTO-ENTMCNC: 30.7 G/DL (ref 32–36)
MCV RBC AUTO: 79 FL (ref 82–98)
NUCLEATED RBC (/100WBC) (OHS): 0 /100 WBC
PLATELET # BLD AUTO: 204 K/UL (ref 150–450)
PMV BLD AUTO: 10.2 FL (ref 9.2–12.9)
POTASSIUM SERPL-SCNC: 4.5 MMOL/L (ref 3.5–5.1)
PROT SERPL-MCNC: 7.9 GM/DL (ref 6–8.4)
RBC # BLD AUTO: 4.95 M/UL (ref 4.6–6.2)
RELATIVE EOSINOPHIL (OHS): 2.9 %
RELATIVE LYMPHOCYTE (OHS): 51.6 % (ref 18–48)
RELATIVE MONOCYTE (OHS): 9.3 % (ref 4–15)
RELATIVE NEUTROPHIL (OHS): 35.5 % (ref 38–73)
SODIUM SERPL-SCNC: 141 MMOL/L (ref 136–145)
WBC # BLD AUTO: 5.58 K/UL (ref 3.9–12.7)

## 2025-04-21 PROCEDURE — 96375 TX/PRO/DX INJ NEW DRUG ADDON: CPT

## 2025-04-21 PROCEDURE — 83735 ASSAY OF MAGNESIUM: CPT | Performed by: EMERGENCY MEDICINE

## 2025-04-21 PROCEDURE — 82077 ASSAY SPEC XCP UR&BREATH IA: CPT | Performed by: EMERGENCY MEDICINE

## 2025-04-21 PROCEDURE — 25000003 PHARM REV CODE 250: Performed by: EMERGENCY MEDICINE

## 2025-04-21 PROCEDURE — 96361 HYDRATE IV INFUSION ADD-ON: CPT

## 2025-04-21 PROCEDURE — 85025 COMPLETE CBC W/AUTO DIFF WBC: CPT | Performed by: EMERGENCY MEDICINE

## 2025-04-21 PROCEDURE — 96366 THER/PROPH/DIAG IV INF ADDON: CPT

## 2025-04-21 PROCEDURE — 83690 ASSAY OF LIPASE: CPT | Performed by: EMERGENCY MEDICINE

## 2025-04-21 PROCEDURE — 80053 COMPREHEN METABOLIC PANEL: CPT | Performed by: EMERGENCY MEDICINE

## 2025-04-21 PROCEDURE — 63600175 PHARM REV CODE 636 W HCPCS: Performed by: EMERGENCY MEDICINE

## 2025-04-21 PROCEDURE — 82550 ASSAY OF CK (CPK): CPT | Performed by: EMERGENCY MEDICINE

## 2025-04-21 PROCEDURE — 96365 THER/PROPH/DIAG IV INF INIT: CPT

## 2025-04-21 PROCEDURE — 96376 TX/PRO/DX INJ SAME DRUG ADON: CPT

## 2025-04-21 RX ORDER — HYDROMORPHONE HYDROCHLORIDE 1 MG/ML
2 INJECTION, SOLUTION INTRAMUSCULAR; INTRAVENOUS; SUBCUTANEOUS
Refills: 0 | Status: COMPLETED | OUTPATIENT
Start: 2025-04-21 | End: 2025-04-21

## 2025-04-21 RX ORDER — HYDROMORPHONE HYDROCHLORIDE 1 MG/ML
1 INJECTION, SOLUTION INTRAMUSCULAR; INTRAVENOUS; SUBCUTANEOUS
Status: COMPLETED | OUTPATIENT
Start: 2025-04-21 | End: 2025-04-21

## 2025-04-21 RX ADMIN — PROMETHAZINE HYDROCHLORIDE 12.5 MG: 25 INJECTION INTRAMUSCULAR; INTRAVENOUS at 12:04

## 2025-04-21 RX ADMIN — HYDROMORPHONE HYDROCHLORIDE 2 MG: 1 INJECTION, SOLUTION INTRAMUSCULAR; INTRAVENOUS; SUBCUTANEOUS at 12:04

## 2025-04-21 RX ADMIN — HYDROMORPHONE HYDROCHLORIDE 1 MG: 1 INJECTION, SOLUTION INTRAMUSCULAR; INTRAVENOUS; SUBCUTANEOUS at 03:04

## 2025-04-21 RX ADMIN — SODIUM CHLORIDE 1000 ML: 9 INJECTION, SOLUTION INTRAVENOUS at 12:04

## 2025-04-21 NOTE — ED PROVIDER NOTES
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  Patient was just discharged yesterday after another admission for acute on chronic pancreatitis.  He felt good upon discharge and celebrated by eating Chinese food last night.  He woke up this morning with recurrent left upper quadrant pain and vomiting, which continued throughout the day with worsening pain after he got back from work.  He was discharged with prescription for oxycodone and Zofran/Phenergan but has not been able to fill these yet.  He denies any alcohol use since discharge.  He did have some blood streaks in his emesis but no large amount of blood.  He also has had chronic diarrhea that was present during admission, no blood in stool but he reports some mucus.  He also reports some muscle soreness particularly in his upper arms, denies any lifting or significant exertion today.  No SOB, chest pain, or other new complaints      Review of patient's allergies indicates:   Allergen Reactions    [place] Swelling    Droperidol Hives    Pecan nut Swelling    Penicillins Hives     Tolerates cephalosporins    Tolerated piperacillin/tazobactam 11/2023    Sulfa (sulfonamide antibiotics) Hives    Opioids - morphine analogues Itching, Nausea And Vomiting and Rash    Tolonium chloride(toluidine blue-o) Hives, Itching and Rash    Toradol [ketorolac] Rash     Past Medical History:   Diagnosis Date    Acute necrotizing pancreatitis 09/20/2023    Alcohol use disorder 10/05/2023    Asthma     Chronic pancreatitis 09/20/2023    Hepatitis C antibody positive in blood 09/18/2023 9/2023 PCR negative    HIV infection 10/2021     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     Past Surgical History:   Procedure Laterality Date    ENDOSCOPIC ULTRASOUND OF UPPER GASTROINTESTINAL TRACT N/A 10/23/2023    Procedure: ULTRASOUND, UPPER GI TRACT, ENDOSCOPIC;  Surgeon: Emil Villatoro MD;  Location: [facility] ENDO (2ND FLR);  Service: Endoscopy;  Laterality: N/A;    ENDOSCOPIC ULTRASOUND OF UPPER GASTROINTESTINAL TRACT N/A 7/24/2024    Procedure: ULTRASOUND, UPPER GI TRACT, ENDOSCOPIC;  Surgeon: Faustino Trujillo MD;  Location: [facility] ENDO (2ND FLR);  Service: Endoscopy;  Laterality: N/A;    ERCP N/A 10/23/2023    Procedure: ERCP (ENDOSCOPIC RETROGRADE CHOLANGIOPANCREATOGRAPHY);  Surgeon: Emil Villatoro MD;  Location: [facility] ENDO (2ND FLR);  Service: Endoscopy;  Laterality: N/A;    ESOPHAGOGASTRODUODENOSCOPY N/A 9/18/2023    Procedure: EGD (ESOPHAGOGASTRODUODENOSCOPY);  Surgeon: Kg Cleaning MD;  Location: [facility] ENDO (2ND FLR);  Service: Endoscopy;  Laterality: N/A;    ESOPHAGOGASTRODUODENOSCOPY N/A 3/8/2024    Procedure: EGD (ESOPHAGOGASTRODUODENOSCOPY);  Surgeon: Greg Love MD;  Location: [facility] ENDO (2ND FLR);  Service: Endoscopy;  Laterality: N/A;    ESOPHAGOGASTRODUODENOSCOPY N/A 7/24/2024    Procedure: EGD (ESOPHAGOGASTRODUODENOSCOPY);  Surgeon: Faustino Trujillo MD;  Location: [facility] ENDO (2ND FLR);  Service: Endoscopy;  Laterality: N/A;     Family History   Problem Relation Name Age of Onset    Pancreatitis Mother      Cancer Mother      Ovarian cancer Mother      No Known Problems Father      Cancer Brother      Breast cancer Maternal Grandmother       Social History[1]  Review of Systems   Constitutional:  Negative for fever.   HENT:  Negative for congestion.    Eyes:  Negative for redness.   Respiratory:  Negative for shortness of  breath.    Cardiovascular:  Negative for chest pain.   Gastrointestinal:  Positive for abdominal pain, diarrhea, nausea and vomiting.   Genitourinary:  Negative for dysuria.   Musculoskeletal:  Positive for myalgias.   Skin:  Negative for rash.   Neurological:  Negative for headaches.   Psychiatric/Behavioral:  Negative for confusion.        Physical Exam     Initial Vitals [04/20/25 2344]   BP Pulse Resp Temp SpO2   110/67 94 17 98.6 °F (37 °C) 99 %      MAP       --         Physical Exam    Nursing note and vitals reviewed.  Constitutional: He appears well-developed and well-nourished. He is not diaphoretic. No distress.   HENT:   Head: Normocephalic and atraumatic.   Eyes: Conjunctivae are normal. No scleral icterus.   Neck: Neck supple.   Cardiovascular:  Normal rate, regular rhythm, normal heart sounds and intact distal pulses.           No murmur heard.  Pulmonary/Chest: Breath sounds normal. No respiratory distress. He has no wheezes. He has no rhonchi. He has no rales.   Abdominal: Abdomen is soft. There is abdominal tenderness.   Left upper quadrant and epigastric tenderness, no acute abdomen There is no rebound and no guarding.   Musculoskeletal:         General: No edema.      Cervical back: Neck supple.     Neurological: He is alert and oriented to person, place, and time.   Skin: Skin is warm and dry.   Psychiatric: He has a normal mood and affect.         ED Course   Procedures  Labs Reviewed   COMPREHENSIVE METABOLIC PANEL - Abnormal       Result Value    Sodium 141      Potassium 4.5      Chloride 108      CO2 21 (*)     Glucose 128 (*)     BUN 6      Creatinine 0.8      Calcium 8.4 (*)     Protein Total 7.9      Albumin 3.8      Bilirubin Total 0.2            AST 96 (*)     ALT 66 (*)     Anion Gap 12      eGFR >60     CBC WITH DIFFERENTIAL - Abnormal    WBC 5.58      RBC 4.95      HGB 12.0 (*)     HCT 39.1 (*)     MCV 79 (*)     MCH 24.2 (*)     MCHC 30.7 (*)     RDW 18.6 (*)     Platelet  Count 204      MPV 10.2      Nucleated RBC 0      Neut % 35.5 (*)     Lymph % 51.6 (*)     Mono % 9.3      Eos % 2.9      Basophil % 0.5      Imm Grans % 0.2      Neut # 1.98      Lymph # 2.88      Mono # 0.52      Eos # 0.16      Baso # 0.03      Imm Grans # 0.01     ALCOHOL,MEDICAL (ETHANOL) - Abnormal    Alcohol, Serum 198 (*)    MAGNESIUM - Normal    Magnesium  2.0     LIPASE - Normal    Lipase Level 19     CK - Normal    CPK 86     CBC W/ AUTO DIFFERENTIAL    Narrative:     The following orders were created for panel order CBC auto differential.  Procedure                               Abnormality         Status                     ---------                               -----------         ------                     CBC with Differential[[id number]]       Abnormal            Final result                 Please view results for these tests on the individual orders.          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  Patient has had multiple admissions for acute on chronic pancreatitis in the past few weeks, it was discharged yesterday and was feeling better.  However he ate Chinese food to celebrate last night, and woke up this morning with recurrent upper abdominal pain and vomiting which persisted all day.  He was unable to fill  Percocet and antiemetic prescribed yesterday, denies any alcohol use since discharge.  He also reports some blood streaks in his emesis and chronic mucus diarrhea but no gross hematemesis or blood in stool, no fevers or other new complaints.  Pain is the same as symptoms when admitted.  He also reports some upper arm soreness that is new, denies any injury or activity to cause this.  He denies any alcohol use since discharge.  On exam patient with left upper quadrant and epigastric tenderness, but no acute abdomen, no lower abdominal tenderness or other concerning exam findings, he has normal vitals and is well-appearing on arrival.  Differential diagnosis includes chronic pancreatitis flare, gastritis, dehydration, CHARY.  Patient just had CT 2 days ago with signs of acute pancreatitis but no sign of recurrent splenic vein thrombosis or other intra-abdominal process, no indication for emergent repeat at this time.      Initial labs with no leukocytosis or worsening anemia, CMP with normal renal function and no elevated lipase; AST and ALT are mildly elevated similar to previous.  CPK normal.  Patient still complained of pain and nausea after initial dose of Dilaudid/Phenergan.  However, his alcohol level then resulted at 198.  Patient now admits that he had a glass of wine last night with dinner but denies any drinking earlier today to account for this.  He had similar presentation a few weeks ago with elevated alcohol level above 200 and exacerbation of chronic pancreatitis, he denied alcohol intake then as well.  I suspect patient is still abusing alcohol accounting for his recurrent chronic pancreatitis flare.  No indication for repeat admission, he was given additional dose of Dilaudid/Phenergan with further improvement and discharged in stable condition.  Had extensive discussion with him about dangers of alcohol abuse with his chronic pancreatitis and need to be forthcoming with providers, and return  precautions.        Risk  Prescription drug management.                                      Clinical Impression:  Final diagnoses:  [R10.12] Left upper quadrant abdominal pain (Primary)  [K86.0] Alcohol-induced chronic pancreatitis          ED Disposition Condition    Discharge Stable          ED Prescriptions    None       Follow-up Information       Follow up With Specialties Details Why Contact Info    Pina Jones NP Family Medicine Schedule an appointment as soon as possible for a visit in 3 days  [address] [facility] [address]  [phone]                   [1]   Social History  Tobacco Use    Smoking status: Former     Types: Cigarettes    Smokeless tobacco: Never   Substance Use Topics    Alcohol use: Not Currently    Drug use: Yes     Types: Marijuana        Rogelio San MD  04/21/25 0605

## 2025-04-21 NOTE — ED NOTES
Patient is asking for something to eat. Verified with MD Jaswinder. MD approves. Patient given a sandwich.

## 2025-04-21 NOTE — ED TRIAGE NOTES
Tasia Cardona, a 24 y.o. male presents to the ED w/ complaint of abd pain that started earlier today after the pt ate fried food. Pt stated he knows eating fried food sometimes makes his pancreatitis flare up, however pt stated he was craving fried rangoon. Pt stated a few hours after eating the fried rangoon he started to feel pain in his abd. Pt is AAOX4 with no acute distress noted.     Triage note:  Chief Complaint   Patient presents with    Abdominal Pain     LUQ pain w/ NVD over the past 3 hours  Hx of pancreatitis      Review of patient's allergies indicates:   Allergen Reactions    [place] Swelling    Droperidol Hives    Pecan nut Swelling    Penicillins Hives     Tolerates cephalosporins    Tolerated piperacillin/tazobactam 11/2023    Sulfa (sulfonamide antibiotics) Hives    Opioids - morphine analogues Itching, Nausea And Vomiting and Rash    Tolonium chloride(toluidine blue-o) Hives, Itching and Rash    Toradol [ketorolac] Rash     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

## 2025-04-22 ENCOUNTER — TELEPHONE (OUTPATIENT)
Dept: INFECTIOUS DISEASES | Facility: CLINIC | Age: 25
End: 2025-04-22
Payer: MEDICAID

## 2025-04-22 DIAGNOSIS — K86.1 ACUTE ON CHRONIC PANCREATITIS: ICD-10-CM

## 2025-04-22 DIAGNOSIS — K85.90 ACUTE ON CHRONIC PANCREATITIS: ICD-10-CM

## 2025-04-22 RX ORDER — ONDANSETRON 4 MG/1
4 TABLET, ORALLY DISINTEGRATING ORAL EVERY 8 HOURS PRN
Qty: 12 TABLET | Refills: 0 | Status: SHIPPED | OUTPATIENT
Start: 2025-04-22

## 2025-04-22 RX ORDER — OXYCODONE AND ACETAMINOPHEN 10; 325 MG/1; MG/1
1 TABLET ORAL EVERY 6 HOURS PRN
Qty: 20 TABLET | Refills: 0 | Status: SHIPPED | OUTPATIENT
Start: 2025-04-22 | End: 2025-04-27

## 2025-04-23 NOTE — ASSESSMENT & PLAN NOTE
Patient has history of splenic vein thrombosis with gastric varices (11/2023 on CTA) in the setting of polycythemia vera and chronic pancreatitis. He is not currently on anticoagulation.    [0] : 2) Feeling down, depressed, or hopeless: Not at all (0) [PHQ-2 Negative - No further assessment needed] : PHQ-2 Negative - No further assessment needed [Never] : Never [[id number]] : 0

## 2025-04-28 ENCOUNTER — HOSPITAL ENCOUNTER (INPATIENT)
Facility: OTHER | Age: 25
LOS: 2 days | Discharge: HOME | DRG: 439 | End: 2025-04-30
Attending: EMERGENCY MEDICINE | Admitting: HOSPITALIST
Payer: MEDICAID

## 2025-04-28 DIAGNOSIS — K86.1 ACUTE ON CHRONIC PANCREATITIS: Primary | ICD-10-CM

## 2025-04-28 DIAGNOSIS — K85.90 ACUTE ON CHRONIC PANCREATITIS: Primary | ICD-10-CM

## 2025-04-28 DIAGNOSIS — R10.9 ABDOMINAL PAIN: ICD-10-CM

## 2025-04-28 DIAGNOSIS — F10.10 ALCOHOL ABUSE: ICD-10-CM

## 2025-04-28 DIAGNOSIS — Z21 HIV INFECTION, UNSPECIFIED SYMPTOM STATUS: ICD-10-CM

## 2025-04-28 LAB
ABSOLUTE EOSINOPHIL (OHS): 0.17 K/UL
ABSOLUTE MONOCYTE (OHS): 0.54 K/UL (ref 0.3–1)
ABSOLUTE NEUTROPHIL COUNT (OHS): 1.49 K/UL (ref 1.8–7.7)
ALBUMIN SERPL BCP-MCNC: 3.5 G/DL (ref 3.5–5.2)
ALP SERPL-CCNC: 84 UNIT/L (ref 40–150)
ALT SERPL W/O P-5'-P-CCNC: 33 UNIT/L (ref 10–44)
ANION GAP (OHS): 12 MMOL/L (ref 8–16)
AST SERPL-CCNC: 37 UNIT/L (ref 11–45)
BASOPHILS # BLD AUTO: 0.05 K/UL
BASOPHILS NFR BLD AUTO: 1.1 %
BILIRUB SERPL-MCNC: 0.2 MG/DL (ref 0.1–1)
BUN SERPL-MCNC: 7 MG/DL (ref 6–20)
CALCIUM SERPL-MCNC: 8.6 MG/DL (ref 8.7–10.5)
CHLORIDE SERPL-SCNC: 111 MMOL/L (ref 95–110)
CO2 SERPL-SCNC: 20 MMOL/L (ref 23–29)
CREAT SERPL-MCNC: 0.8 MG/DL (ref 0.5–1.4)
CREAT SERPL-MCNC: 1 MG/DL (ref 0.5–1.4)
ERYTHROCYTE [DISTWIDTH] IN BLOOD BY AUTOMATED COUNT: 20.4 % (ref 11.5–14.5)
ETHANOL SERPL-MCNC: 191 MG/DL
GFR SERPLBLD CREATININE-BSD FMLA CKD-EPI: >60 ML/MIN/1.73/M2
GLUCOSE SERPL-MCNC: 120 MG/DL (ref 70–110)
HCT VFR BLD AUTO: 37.9 % (ref 40–54)
HGB BLD-MCNC: 12 GM/DL (ref 14–18)
IMM GRANULOCYTES # BLD AUTO: 0.01 K/UL (ref 0–0.04)
IMM GRANULOCYTES NFR BLD AUTO: 0.2 % (ref 0–0.5)
LIPASE SERPL-CCNC: 17 U/L (ref 4–60)
LYMPHOCYTES # BLD AUTO: 2.12 K/UL (ref 1–4.8)
MAGNESIUM SERPL-MCNC: 2.2 MG/DL (ref 1.6–2.6)
MCH RBC QN AUTO: 25.5 PG (ref 27–31)
MCHC RBC AUTO-ENTMCNC: 31.7 G/DL (ref 32–36)
MCV RBC AUTO: 81 FL (ref 82–98)
NUCLEATED RBC (/100WBC) (OHS): 0 /100 WBC
PLATELET # BLD AUTO: 215 K/UL (ref 150–450)
PMV BLD AUTO: 9.4 FL (ref 9.2–12.9)
POTASSIUM SERPL-SCNC: 3 MMOL/L (ref 3.5–5.1)
PROT SERPL-MCNC: 7 GM/DL (ref 6–8.4)
RBC # BLD AUTO: 4.71 M/UL (ref 4.6–6.2)
RELATIVE EOSINOPHIL (OHS): 3.9 %
RELATIVE LYMPHOCYTE (OHS): 48.4 % (ref 18–48)
RELATIVE MONOCYTE (OHS): 12.3 % (ref 4–15)
RELATIVE NEUTROPHIL (OHS): 34.1 % (ref 38–73)
SAMPLE: NORMAL
SODIUM SERPL-SCNC: 143 MMOL/L (ref 136–145)
WBC # BLD AUTO: 4.38 K/UL (ref 3.9–12.7)

## 2025-04-28 PROCEDURE — 85025 COMPLETE CBC W/AUTO DIFF WBC: CPT | Performed by: PHYSICIAN ASSISTANT

## 2025-04-28 PROCEDURE — 99285 EMERGENCY DEPT VISIT HI MDM: CPT | Mod: 25

## 2025-04-28 PROCEDURE — 83690 ASSAY OF LIPASE: CPT | Performed by: PHYSICIAN ASSISTANT

## 2025-04-28 PROCEDURE — 96375 TX/PRO/DX INJ NEW DRUG ADDON: CPT

## 2025-04-28 PROCEDURE — 96376 TX/PRO/DX INJ SAME DRUG ADON: CPT

## 2025-04-28 PROCEDURE — 21400001 HC TELEMETRY ROOM

## 2025-04-28 PROCEDURE — 96366 THER/PROPH/DIAG IV INF ADDON: CPT

## 2025-04-28 PROCEDURE — 83735 ASSAY OF MAGNESIUM: CPT | Performed by: NURSE PRACTITIONER

## 2025-04-28 PROCEDURE — 94761 N-INVAS EAR/PLS OXIMETRY MLT: CPT

## 2025-04-28 PROCEDURE — 80053 COMPREHEN METABOLIC PANEL: CPT | Performed by: PHYSICIAN ASSISTANT

## 2025-04-28 PROCEDURE — 82077 ASSAY SPEC XCP UR&BREATH IA: CPT | Performed by: NURSE PRACTITIONER

## 2025-04-28 PROCEDURE — 82565 ASSAY OF CREATININE: CPT

## 2025-04-28 PROCEDURE — 99900035 HC TECH TIME PER 15 MIN (STAT)

## 2025-04-28 PROCEDURE — 11000001 HC ACUTE MED/SURG PRIVATE ROOM

## 2025-04-28 PROCEDURE — 63600175 PHARM REV CODE 636 W HCPCS: Performed by: PHYSICIAN ASSISTANT

## 2025-04-28 PROCEDURE — 96365 THER/PROPH/DIAG IV INF INIT: CPT

## 2025-04-28 PROCEDURE — 25000003 PHARM REV CODE 250: Performed by: PHYSICIAN ASSISTANT

## 2025-04-28 PROCEDURE — 63600175 PHARM REV CODE 636 W HCPCS: Performed by: NURSE PRACTITIONER

## 2025-04-28 RX ORDER — HYDROMORPHONE HYDROCHLORIDE 1 MG/ML
1 INJECTION, SOLUTION INTRAMUSCULAR; INTRAVENOUS; SUBCUTANEOUS
Refills: 0 | Status: COMPLETED | OUTPATIENT
Start: 2025-04-28 | End: 2025-04-28

## 2025-04-28 RX ORDER — SODIUM CHLORIDE, SODIUM LACTATE, POTASSIUM CHLORIDE, CALCIUM CHLORIDE 600; 310; 30; 20 MG/100ML; MG/100ML; MG/100ML; MG/100ML
INJECTION, SOLUTION INTRAVENOUS CONTINUOUS
Status: DISCONTINUED | OUTPATIENT
Start: 2025-04-28 | End: 2025-04-30 | Stop reason: HOSPADM

## 2025-04-28 RX ORDER — SODIUM CHLORIDE 0.9 % (FLUSH) 0.9 %
10 SYRINGE (ML) INJECTION
Status: DISCONTINUED | OUTPATIENT
Start: 2025-04-28 | End: 2025-04-30 | Stop reason: HOSPADM

## 2025-04-28 RX ORDER — GLUCAGON 1 MG
1 KIT INJECTION
Status: DISCONTINUED | OUTPATIENT
Start: 2025-04-28 | End: 2025-04-29

## 2025-04-28 RX ORDER — DIAZEPAM 5 MG/1
5 TABLET ORAL EVERY 4 HOURS PRN
Status: DISCONTINUED | OUTPATIENT
Start: 2025-04-29 | End: 2025-04-30 | Stop reason: HOSPADM

## 2025-04-28 RX ORDER — IBUPROFEN 200 MG
16 TABLET ORAL
Status: DISCONTINUED | OUTPATIENT
Start: 2025-04-28 | End: 2025-04-29

## 2025-04-28 RX ORDER — OXYCODONE AND ACETAMINOPHEN 10; 325 MG/1; MG/1
1 TABLET ORAL EVERY 4 HOURS PRN
Refills: 0 | Status: DISCONTINUED | OUTPATIENT
Start: 2025-04-28 | End: 2025-04-29

## 2025-04-28 RX ORDER — FOLIC ACID 1 MG/1
1 TABLET ORAL DAILY
Status: DISCONTINUED | OUTPATIENT
Start: 2025-04-29 | End: 2025-04-30 | Stop reason: HOSPADM

## 2025-04-28 RX ORDER — NALOXONE HCL 0.4 MG/ML
0.02 VIAL (ML) INJECTION
Status: DISCONTINUED | OUTPATIENT
Start: 2025-04-28 | End: 2025-04-30 | Stop reason: HOSPADM

## 2025-04-28 RX ORDER — IBUPROFEN 200 MG
24 TABLET ORAL
Status: DISCONTINUED | OUTPATIENT
Start: 2025-04-28 | End: 2025-04-29

## 2025-04-28 RX ORDER — SODIUM CHLORIDE 0.9 % (FLUSH) 0.9 %
10 SYRINGE (ML) INJECTION EVERY 8 HOURS
Status: DISCONTINUED | OUTPATIENT
Start: 2025-04-28 | End: 2025-04-30 | Stop reason: HOSPADM

## 2025-04-28 RX ORDER — TALC
6 POWDER (GRAM) TOPICAL NIGHTLY PRN
Status: DISCONTINUED | OUTPATIENT
Start: 2025-04-28 | End: 2025-04-30 | Stop reason: HOSPADM

## 2025-04-28 RX ORDER — ONDANSETRON HYDROCHLORIDE 2 MG/ML
4 INJECTION, SOLUTION INTRAVENOUS
Status: DISCONTINUED | OUTPATIENT
Start: 2025-04-28 | End: 2025-04-28

## 2025-04-28 RX ORDER — DICYCLOMINE HYDROCHLORIDE 10 MG/ML
20 INJECTION INTRAMUSCULAR EVERY 6 HOURS PRN
Status: DISCONTINUED | OUTPATIENT
Start: 2025-04-28 | End: 2025-04-29

## 2025-04-28 RX ORDER — PANTOPRAZOLE SODIUM 40 MG/10ML
40 INJECTION, POWDER, LYOPHILIZED, FOR SOLUTION INTRAVENOUS
Status: COMPLETED | OUTPATIENT
Start: 2025-04-28 | End: 2025-04-28

## 2025-04-28 RX ORDER — DIPHENHYDRAMINE HYDROCHLORIDE 50 MG/ML
25 INJECTION, SOLUTION INTRAMUSCULAR; INTRAVENOUS EVERY 6 HOURS PRN
Status: DISCONTINUED | OUTPATIENT
Start: 2025-04-28 | End: 2025-04-30 | Stop reason: HOSPADM

## 2025-04-28 RX ORDER — PROCHLORPERAZINE EDISYLATE 5 MG/ML
10 INJECTION INTRAMUSCULAR; INTRAVENOUS EVERY 6 HOURS PRN
Status: DISCONTINUED | OUTPATIENT
Start: 2025-04-28 | End: 2025-04-30 | Stop reason: HOSPADM

## 2025-04-28 RX ORDER — ACETAMINOPHEN 325 MG/1
650 TABLET ORAL EVERY 6 HOURS PRN
Status: DISCONTINUED | OUTPATIENT
Start: 2025-04-28 | End: 2025-04-29

## 2025-04-28 RX ORDER — LANOLIN ALCOHOL/MO/W.PET/CERES
100 CREAM (GRAM) TOPICAL DAILY
Status: DISCONTINUED | OUTPATIENT
Start: 2025-04-29 | End: 2025-04-30 | Stop reason: HOSPADM

## 2025-04-28 RX ORDER — HYDROMORPHONE HYDROCHLORIDE 1 MG/ML
2 INJECTION, SOLUTION INTRAMUSCULAR; INTRAVENOUS; SUBCUTANEOUS
Status: COMPLETED | OUTPATIENT
Start: 2025-04-28 | End: 2025-04-28

## 2025-04-28 RX ORDER — AMOXICILLIN 250 MG
1 CAPSULE ORAL 2 TIMES DAILY PRN
Status: DISCONTINUED | OUTPATIENT
Start: 2025-04-28 | End: 2025-04-30 | Stop reason: HOSPADM

## 2025-04-28 RX ADMIN — SODIUM CHLORIDE, POTASSIUM CHLORIDE, SODIUM LACTATE AND CALCIUM CHLORIDE: 600; 310; 30; 20 INJECTION, SOLUTION INTRAVENOUS at 11:04

## 2025-04-28 RX ADMIN — PANTOPRAZOLE SODIUM 40 MG: 40 INJECTION, POWDER, FOR SOLUTION INTRAVENOUS at 07:04

## 2025-04-28 RX ADMIN — SODIUM CHLORIDE 1000 ML: 9 INJECTION, SOLUTION INTRAVENOUS at 08:04

## 2025-04-28 RX ADMIN — HYDROMORPHONE HYDROCHLORIDE 1 MG: 1 INJECTION, SOLUTION INTRAMUSCULAR; INTRAVENOUS; SUBCUTANEOUS at 08:04

## 2025-04-28 RX ADMIN — HYDROMORPHONE HYDROCHLORIDE 2 MG: 1 INJECTION, SOLUTION INTRAMUSCULAR; INTRAVENOUS; SUBCUTANEOUS at 08:04

## 2025-04-28 RX ADMIN — PROCHLORPERAZINE EDISYLATE 10 MG: 5 INJECTION INTRAMUSCULAR; INTRAVENOUS at 11:04

## 2025-04-28 RX ADMIN — DIPHENHYDRAMINE HYDROCHLORIDE 25 MG: 50 INJECTION, SOLUTION INTRAMUSCULAR; INTRAVENOUS at 11:04

## 2025-04-28 NOTE — ED NOTES
Pt requesting no labs from phlebotomy as he is a very hard stick and wants to get them drawn in the back

## 2025-04-29 LAB
ABSOLUTE EOSINOPHIL (OHS): 0.12 K/UL
ABSOLUTE MONOCYTE (OHS): 0.45 K/UL (ref 0.3–1)
ABSOLUTE NEUTROPHIL COUNT (OHS): 0.88 K/UL (ref 1.8–7.7)
ALBUMIN SERPL BCP-MCNC: 3.1 G/DL (ref 3.5–5.2)
ALP SERPL-CCNC: 72 UNIT/L (ref 40–150)
ALT SERPL W/O P-5'-P-CCNC: 30 UNIT/L (ref 10–44)
ANION GAP (OHS): 9 MMOL/L (ref 8–16)
AST SERPL-CCNC: 31 UNIT/L (ref 11–45)
BASOPHILS # BLD AUTO: 0.02 K/UL
BASOPHILS NFR BLD AUTO: 0.6 %
BILIRUB SERPL-MCNC: 0.3 MG/DL (ref 0.1–1)
BUN SERPL-MCNC: 7 MG/DL (ref 6–20)
CALCIUM SERPL-MCNC: 7.7 MG/DL (ref 8.7–10.5)
CHLORIDE SERPL-SCNC: 109 MMOL/L (ref 95–110)
CO2 SERPL-SCNC: 20 MMOL/L (ref 23–29)
CREAT SERPL-MCNC: 0.7 MG/DL (ref 0.5–1.4)
ERYTHROCYTE [DISTWIDTH] IN BLOOD BY AUTOMATED COUNT: 20 % (ref 11.5–14.5)
GFR SERPLBLD CREATININE-BSD FMLA CKD-EPI: >60 ML/MIN/1.73/M2
GLUCOSE SERPL-MCNC: 93 MG/DL (ref 70–110)
HCT VFR BLD AUTO: 33.7 % (ref 40–54)
HGB BLD-MCNC: 10.4 GM/DL (ref 14–18)
IMM GRANULOCYTES # BLD AUTO: 0.01 K/UL (ref 0–0.04)
IMM GRANULOCYTES NFR BLD AUTO: 0.3 % (ref 0–0.5)
LIPASE SERPL-CCNC: 13 U/L (ref 4–60)
LYMPHOCYTES # BLD AUTO: 2.15 K/UL (ref 1–4.8)
MAGNESIUM SERPL-MCNC: 1.7 MG/DL (ref 1.6–2.6)
MCH RBC QN AUTO: 25 PG (ref 27–31)
MCHC RBC AUTO-ENTMCNC: 30.9 G/DL (ref 32–36)
MCV RBC AUTO: 81 FL (ref 82–98)
NUCLEATED RBC (/100WBC) (OHS): 0 /100 WBC
PLATELET # BLD AUTO: 158 K/UL (ref 150–450)
PMV BLD AUTO: 9.6 FL (ref 9.2–12.9)
POTASSIUM SERPL-SCNC: 3.1 MMOL/L (ref 3.5–5.1)
PROT SERPL-MCNC: 5.9 GM/DL (ref 6–8.4)
RBC # BLD AUTO: 4.16 M/UL (ref 4.6–6.2)
RELATIVE EOSINOPHIL (OHS): 3.3 %
RELATIVE LYMPHOCYTE (OHS): 59.2 % (ref 18–48)
RELATIVE MONOCYTE (OHS): 12.4 % (ref 4–15)
RELATIVE NEUTROPHIL (OHS): 24.2 % (ref 38–73)
SODIUM SERPL-SCNC: 138 MMOL/L (ref 136–145)
WBC # BLD AUTO: 3.63 K/UL (ref 3.9–12.7)

## 2025-04-29 PROCEDURE — 36410 VNPNXR 3YR/> PHY/QHP DX/THER: CPT

## 2025-04-29 PROCEDURE — 63600175 PHARM REV CODE 636 W HCPCS: Performed by: PHYSICIAN ASSISTANT

## 2025-04-29 PROCEDURE — 36415 COLL VENOUS BLD VENIPUNCTURE: CPT | Performed by: PHYSICIAN ASSISTANT

## 2025-04-29 PROCEDURE — 25000003 PHARM REV CODE 250: Performed by: HOSPITALIST

## 2025-04-29 PROCEDURE — 85025 COMPLETE CBC W/AUTO DIFF WBC: CPT | Performed by: PHYSICIAN ASSISTANT

## 2025-04-29 PROCEDURE — 21400001 HC TELEMETRY ROOM

## 2025-04-29 PROCEDURE — 63600175 PHARM REV CODE 636 W HCPCS: Performed by: HOSPITALIST

## 2025-04-29 PROCEDURE — 83690 ASSAY OF LIPASE: CPT | Performed by: PHYSICIAN ASSISTANT

## 2025-04-29 PROCEDURE — C1751 CATH, INF, PER/CENT/MIDLINE: HCPCS

## 2025-04-29 PROCEDURE — 80053 COMPREHEN METABOLIC PANEL: CPT | Performed by: PHYSICIAN ASSISTANT

## 2025-04-29 PROCEDURE — 83735 ASSAY OF MAGNESIUM: CPT | Performed by: PHYSICIAN ASSISTANT

## 2025-04-29 PROCEDURE — 94761 N-INVAS EAR/PLS OXIMETRY MLT: CPT

## 2025-04-29 PROCEDURE — 25000003 PHARM REV CODE 250: Performed by: PHYSICIAN ASSISTANT

## 2025-04-29 RX ORDER — POTASSIUM CHLORIDE 20 MEQ/1
40 TABLET, EXTENDED RELEASE ORAL ONCE
Status: COMPLETED | OUTPATIENT
Start: 2025-04-29 | End: 2025-04-29

## 2025-04-29 RX ORDER — ACETAMINOPHEN 500 MG
1000 TABLET ORAL EVERY 8 HOURS PRN
Status: DISCONTINUED | OUTPATIENT
Start: 2025-04-29 | End: 2025-04-30 | Stop reason: HOSPADM

## 2025-04-29 RX ORDER — LIDOCAINE HYDROCHLORIDE 20 MG/ML
15 SOLUTION OROPHARYNGEAL ONCE AS NEEDED
Status: DISCONTINUED | OUTPATIENT
Start: 2025-04-29 | End: 2025-04-29

## 2025-04-29 RX ORDER — HYDROMORPHONE HYDROCHLORIDE 1 MG/ML
1 INJECTION, SOLUTION INTRAMUSCULAR; INTRAVENOUS; SUBCUTANEOUS EVERY 4 HOURS PRN
Status: DISCONTINUED | OUTPATIENT
Start: 2025-04-29 | End: 2025-04-29

## 2025-04-29 RX ORDER — DIPHENHYDRAMINE HYDROCHLORIDE 50 MG/ML
12.5 INJECTION, SOLUTION INTRAMUSCULAR; INTRAVENOUS ONCE
Status: COMPLETED | OUTPATIENT
Start: 2025-04-29 | End: 2025-04-29

## 2025-04-29 RX ORDER — SODIUM CHLORIDE 0.9 % (FLUSH) 0.9 %
10 SYRINGE (ML) INJECTION EVERY 12 HOURS PRN
Status: DISCONTINUED | OUTPATIENT
Start: 2025-04-29 | End: 2025-04-30 | Stop reason: HOSPADM

## 2025-04-29 RX ORDER — ENOXAPARIN SODIUM 100 MG/ML
40 INJECTION SUBCUTANEOUS EVERY 24 HOURS
Status: DISCONTINUED | OUTPATIENT
Start: 2025-04-29 | End: 2025-04-30 | Stop reason: HOSPADM

## 2025-04-29 RX ORDER — MORPHINE SULFATE 4 MG/ML
4 INJECTION, SOLUTION INTRAMUSCULAR; INTRAVENOUS ONCE
Status: COMPLETED | OUTPATIENT
Start: 2025-04-29 | End: 2025-04-29

## 2025-04-29 RX ORDER — ALUMINUM HYDROXIDE, MAGNESIUM HYDROXIDE, AND SIMETHICONE 1200; 120; 1200 MG/30ML; MG/30ML; MG/30ML
30 SUSPENSION ORAL ONCE AS NEEDED
Status: DISCONTINUED | OUTPATIENT
Start: 2025-04-29 | End: 2025-04-29

## 2025-04-29 RX ORDER — HYDROMORPHONE HYDROCHLORIDE 2 MG/ML
2 INJECTION, SOLUTION INTRAMUSCULAR; INTRAVENOUS; SUBCUTANEOUS EVERY 4 HOURS PRN
Status: DISCONTINUED | OUTPATIENT
Start: 2025-04-29 | End: 2025-04-30 | Stop reason: HOSPADM

## 2025-04-29 RX ORDER — OXYCODONE HYDROCHLORIDE 5 MG/1
5 TABLET ORAL EVERY 4 HOURS PRN
Refills: 0 | Status: DISCONTINUED | OUTPATIENT
Start: 2025-04-29 | End: 2025-04-30 | Stop reason: HOSPADM

## 2025-04-29 RX ORDER — PANTOPRAZOLE SODIUM 40 MG/10ML
40 INJECTION, POWDER, LYOPHILIZED, FOR SOLUTION INTRAVENOUS 2 TIMES DAILY
Status: DISCONTINUED | OUTPATIENT
Start: 2025-04-29 | End: 2025-04-30 | Stop reason: HOSPADM

## 2025-04-29 RX ADMIN — BICTEGRAVIR SODIUM, EMTRICITABINE, AND TENOFOVIR ALAFENAMIDE FUMARATE 1 TABLET: 50; 200; 25 TABLET ORAL at 12:04

## 2025-04-29 RX ADMIN — SODIUM CHLORIDE, POTASSIUM CHLORIDE, SODIUM LACTATE AND CALCIUM CHLORIDE: 600; 310; 30; 20 INJECTION, SOLUTION INTRAVENOUS at 11:04

## 2025-04-29 RX ADMIN — HYDROMORPHONE HYDROCHLORIDE 1 MG: 1 INJECTION, SOLUTION INTRAMUSCULAR; INTRAVENOUS; SUBCUTANEOUS at 11:04

## 2025-04-29 RX ADMIN — OXYCODONE AND ACETAMINOPHEN 1 TABLET: 10; 325 TABLET ORAL at 12:04

## 2025-04-29 RX ADMIN — Medication 100 MG: at 09:04

## 2025-04-29 RX ADMIN — THERA TABS 1 TABLET: TAB at 09:04

## 2025-04-29 RX ADMIN — BICTEGRAVIR SODIUM, EMTRICITABINE, AND TENOFOVIR ALAFENAMIDE FUMARATE 1 TABLET: 50; 200; 25 TABLET ORAL at 09:04

## 2025-04-29 RX ADMIN — HYDROMORPHONE HYDROCHLORIDE 2 MG: 2 INJECTION INTRAMUSCULAR; INTRAVENOUS; SUBCUTANEOUS at 04:04

## 2025-04-29 RX ADMIN — DIPHENHYDRAMINE HYDROCHLORIDE 12.5 MG: 50 INJECTION INTRAMUSCULAR; INTRAVENOUS at 06:04

## 2025-04-29 RX ADMIN — MORPHINE SULFATE 4 MG: 4 INJECTION INTRAVENOUS at 06:04

## 2025-04-29 RX ADMIN — PANTOPRAZOLE SODIUM 40 MG: 40 INJECTION, POWDER, FOR SOLUTION INTRAVENOUS at 06:04

## 2025-04-29 RX ADMIN — HYDROMORPHONE HYDROCHLORIDE 2 MG: 2 INJECTION INTRAMUSCULAR; INTRAVENOUS; SUBCUTANEOUS at 08:04

## 2025-04-29 RX ADMIN — FOLIC ACID 1 MG: 1 TABLET ORAL at 09:04

## 2025-04-29 RX ADMIN — POTASSIUM CHLORIDE 40 MEQ: 1500 TABLET, EXTENDED RELEASE ORAL at 04:04

## 2025-04-29 NOTE — PLAN OF CARE
04/29/25 1200   Readmission   Was this a planned readmission? No   Why were you hospitalized in the last 30 days? HIV infection, unspecified symptom status   Why were you readmitted? New medical problem   When you left the hospital how did you feel? felt okay   When you left the hospital where did you go? Home Alone   Did patient/caregiver refused recommended DC plan? No   Tell me about what happened between when you left the hospital and the day you returned. had a flare up   When did you start not feeling well? 4/28/25   Did you try to manage your symptoms your self? Yes   What did you do? took promethazine   Did you call anyone? No   Did you try to see or did see a doctor or nurse before you came? No   Did you have  a follow-up appointment on discharge? No

## 2025-04-29 NOTE — H&P
[facility] [place], [place] Surg [address] Medicine  History & Physical    Patient Name: Tasia Cardona  MRN: [id number]  Patient Class: IP- Inpatient  Admission Date: 4/28/2025  Attending Physician: Allan Jones MD   Primary Care Provider: Pina Jones NP         Patient information was obtained from patient, past medical records, and ER records.     Subjective:     Principal Problem:Abdominal pain    Chief Complaint:   Chief Complaint   Patient presents with    Pancreatitis     Pt c/o vomiting, diarrhea and belly pain that feels just like a pancreatitis flare. States that he thinks it may have been caused by something he at last night. Denies increased alcohol consumption.        HPI: Mr. Tasia Cardona is a 24 y.o. male, with PMH of chronic pancreatitis, prior necrotizing pancreatitis, alcohol use disorder, Corinne-hitchcock tear, splenic vein thrombosis, HIV, asthma, anemia, polycythemia vera,  who presented to [facility] ED on 4/28/25 due to abdominal pain, with nausea and vomiting x 1 day. He states he thinks this is similar to prior episodes of pancreatitis, and that it may be 2/2 food he consumed as he denied drinking alcohol. Prior notes indicate he usually denies alcohol intake associated with his episodes of pancreatitis despite his documented heave alcohol consumption. He notes also associated diarrhea. He is still able to tolerate some oral intake. He denied fever, chills, sweats. He was evaluated in the ED with labs showing H&H of 12.0/37.9, without leukocytosis or left shift. A metabolic panel showed potassium of 3.0, without further significant abnormalities. Lipase was not elevated. Serum alcohol was elevated at 191.     Past Medical History:   Diagnosis Date    Acute necrotizing pancreatitis 09/20/2023    Alcohol use disorder 10/05/2023    Asthma     Chronic pancreatitis 09/20/2023    Hepatitis C antibody positive in blood 09/18/2023 9/2023 PCR negative    HIV infection 10/2021     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       Past Surgical History:   Procedure Laterality Date    ENDOSCOPIC ULTRASOUND OF UPPER GASTROINTESTINAL TRACT N/A 10/23/2023    Procedure: ULTRASOUND, UPPER GI TRACT, ENDOSCOPIC;  Surgeon: Emil Villatoro MD;  Location: [facility] (2ND FLR);  Service: Endoscopy;  Laterality: N/A;    ENDOSCOPIC ULTRASOUND OF UPPER GASTROINTESTINAL TRACT N/A 7/24/2024    Procedure: ULTRASOUND, UPPER GI TRACT, ENDOSCOPIC;  Surgeon: Faustino Trujillo MD;  Location: [facility] (2ND FLR);  Service: Endoscopy;  Laterality: N/A;    ERCP N/A 10/23/2023    Procedure: ERCP (ENDOSCOPIC RETROGRADE CHOLANGIOPANCREATOGRAPHY);  Surgeon: Emil Villatoro MD;  Location: [facility] (2ND FLR);  Service: Endoscopy;  Laterality: N/A;    ESOPHAGOGASTRODUODENOSCOPY N/A 9/18/2023    Procedure: EGD (ESOPHAGOGASTRODUODENOSCOPY);  Surgeon: Kg Cleaning MD;  Location: [facility] (2ND FLR);  Service: Endoscopy;  Laterality: N/A;    ESOPHAGOGASTRODUODENOSCOPY N/A 3/8/2024    Procedure: EGD (ESOPHAGOGASTRODUODENOSCOPY);  Surgeon: Greg Love MD;  Location: [facility] ENDO (2ND FLR);  Service: Endoscopy;  Laterality: N/A;    ESOPHAGOGASTRODUODENOSCOPY N/A 7/24/2024    Procedure: EGD (ESOPHAGOGASTRODUODENOSCOPY);  Surgeon: Faustino Trujillo MD;  Location: [facility] ENDO (2ND FLR);  Service: Endoscopy;  Laterality: N/A;       Review of patient's allergies indicates:   Allergen Reactions    [place] Swelling    Droperidol Hives    Pecan nut Swelling    Penicillins Hives     Tolerates cephalosporins    Tolerated piperacillin/tazobactam 11/2023    Sulfa (sulfonamide antibiotics) Hives    Tolonium chloride(toluidine blue-o) Hives, Itching and Rash    Toradol [ketorolac] Rash       No current facility-administered medications on  file prior to encounter.     Current Outpatient Medications on File Prior to Encounter   Medication Sig    acetaminophen (TYLENOL) 500 MG tablet Take 2 tablets (1,000 mg total) by mouth every 8 (eight) hours as needed for Pain.    lszqvucvr-puvakrkw-jbbirqz ala (BIKTARVY) -25 mg (25 kg or greater) Take 1 tablet by mouth once daily.    ondansetron (ZOFRAN-ODT) 4 MG TbDL Take 1 tablet (4 mg total) by mouth every 8 (eight) hours as needed (nausea/vomiting).    senna-docusate (PERICOLACE) 8.6-50 mg per tablet Take 1 tablet by mouth 2 (two) times daily.     Family History       Problem Relation (Age of Onset)    Breast cancer Maternal Grandmother    Cancer Mother, Brother    No Known Problems Father    Ovarian cancer Mother    Pancreatitis Mother          Tobacco Use    Smoking status: Former     Types: Cigarettes    Smokeless tobacco: Never   Substance and Sexual Activity    Alcohol use: Not Currently    Drug use: Yes     Types: Marijuana    Sexual activity: Not on file     Review of Systems   Constitutional:  Positive for appetite change, chills and diaphoresis. Negative for fever.   Respiratory:  Negative for cough, shortness of breath and wheezing.    Cardiovascular:  Negative for chest pain and palpitations.   Gastrointestinal:  Positive for abdominal pain, nausea and vomiting. Negative for constipation and diarrhea.   Genitourinary:  Negative for decreased urine volume, difficulty urinating, dysuria, frequency, hematuria and urgency.   Musculoskeletal:  Negative for back pain, neck pain and neck stiffness.   Skin:  Negative for color change and pallor.   Neurological:  Negative for dizziness, syncope, weakness, light-headedness and headaches.   Hematological:  Does not bruise/bleed easily.   Psychiatric/Behavioral:  Negative for agitation and confusion.      Objective:     Vital Signs (Most Recent):  Temp: 97.6 °F (36.4 °C) (04/29/25 0331)  Pulse: 77 (04/29/25 0331)  Resp: 18 (04/29/25 0331)  BP: 116/86  (04/29/25 0331)  SpO2: 98 % (04/29/25 0331) Vital Signs (24h Range):  Temp:  [97.6 °F (36.4 °C)-98.3 °F (36.8 °C)] 97.6 °F (36.4 °C)  Pulse:  [] 77  Resp:  [17-20] 18  SpO2:  [96 %-99 %] 98 %  BP: (115-130)/(74-86) 116/86     Weight: 80.2 kg (176 lb 12.9 oz)  Body mass index is 25.37 kg/m².     Physical Exam  Vitals and nursing note reviewed.   Constitutional:       General: He is not in acute distress.     Appearance: Normal appearance. He is well-developed and normal weight. He is not ill-appearing, toxic-appearing or diaphoretic.   HENT:      Head: Normocephalic and atraumatic.      Right Ear: External ear normal.      Left Ear: External ear normal.   Eyes:      General: No scleral icterus.        Right eye: No discharge.         Left eye: No discharge.      Conjunctiva/sclera: Conjunctivae normal.   Neck:      Vascular: No JVD.      Trachea: No tracheal deviation.   Cardiovascular:      Rate and Rhythm: Normal rate and regular rhythm.      Heart sounds: Normal heart sounds. No murmur heard.     No gallop.   Pulmonary:      Effort: Pulmonary effort is normal. No respiratory distress.      Breath sounds: Normal breath sounds. No stridor. No wheezing or rales.   Abdominal:      General: Bowel sounds are normal. There is no distension.      Palpations: Abdomen is soft. There is no mass.      Tenderness: There is abdominal tenderness (upper quadrants). There is no guarding.   Musculoskeletal:         General: No deformity. Normal range of motion.      Cervical back: Normal range of motion and neck supple.   Skin:     General: Skin is warm and dry.   Neurological:      General: No focal deficit present.      Mental Status: He is alert and oriented to person, place, and time.      Cranial Nerves: No cranial nerve deficit.      Motor: No abnormal muscle tone.      Coordination: Coordination normal.   Psychiatric:         Mood and Affect: Mood normal.         Behavior: Behavior normal.         Thought Content:  "Thought content normal.         Judgment: Judgment normal.                Significant Labs: All pertinent labs within the past 24 hours have been reviewed.  BMP:   Recent Labs   Lab 04/29/25  0354      K 3.1*      CO2 20*   BUN 7   CREATININE 0.7   CALCIUM 7.7*   MG 1.7     CBC:   Recent Labs   Lab 04/28/25 1913 04/29/25  0354   WBC 4.38 3.63*   HGB 12.0* 10.4*   HCT 37.9* 33.7*    158     CMP:   Recent Labs   Lab 04/28/25 2024 04/29/25  0354    138   K 3.0* 3.1*   * 109   CO2 20* 20*   BUN 7 7   CREATININE 0.8 0.7   CALCIUM 8.6* 7.7*   ALBUMIN 3.5 3.1*   BILITOT 0.2 0.3   ALKPHOS 84 72   AST 37 31   ALT 33 30   ANIONGAP 12 9     Urine Culture: No results for input(s): "LABURIN" in the last 48 hours.  Urine Studies: No results for input(s): "COLORU", "APPEARANCEUA", "PHUR", "SPECGRAV", "PROTEINUA", "GLUCUA", "KETONESU", "BILIRUBINUA", "OCCULTUA", "NITRITE", "UROBILINOGEN", "LEUKOCYTESUR", "RBCUA", "WBCUA", "BACTERIA", "SQUAMEPITHEL", "HYALINECASTS" in the last 48 hours.    Invalid input(s): "WRIGHTSUR"    Significant Imaging: I have reviewed all pertinent imaging results/findings within the past 24 hours.  Imaging Results              CT Abdomen Pelvis With IV Contrast NO Oral Contrast (No Result on File)                      Assessment/Plan:     Assessment & Plan  Abdominal pain  - Mr. Tasia Cardona presents with abdominal pain associated with nausea and vomiting   - He has h/o alcoholic pancreatitis, pancreatic pseudocyst w/ prior portal vein thrombosis (no longer anticoagulated)  - labs no c/w pancreatitis   - CT ordered, but patient refused initially, later agreed; results pending  - continue PRN bentyl for cramping, percocet PO for pain, and promethazine (1st choice), and compazine/benadryl combo (2nd choice) for nausea   - continue IV hydration w/ LR   - replaced lytes PRN   - monitor    Chronic pancreatitis  - history noted   - also on chronic opioids w/ chronic constipation "     Pancreatic pseudocyst/cyst  - has previously had infected pseudocyst   - attempted to obtain CT study, but patient refused  - monitor for signs of infection or labs indicating infection as patient has refused   - monitor   HIV infection  - continue home meds  - follow up with [facility] or your PCP    Mild intermittent asthma without complication  - No current symptoms to indicate exacerbation  - monitor & treat PRN     Alcohol use disorder  - reported in the ED he did not drink alcohol  - serum ethanol indicated other wise with elevation to 191  - CIWA scales and PRNs ordered for withdrawal       VTE Risk Mitigation (From admission, onward)           Ordered     IP VTE HIGH RISK PATIENT  Once         04/28/25 2229     Place sequential compression device  Until discontinued         04/28/25 2229                               Patient refused CT Abd/Pelvis at 10:49pm.        KIN FranklinC  Department of Hospital Medicine  [facility] - Med Surg ([address])

## 2025-04-29 NOTE — PROGRESS NOTES
[facility] Surg [address] Medicine  Progress Note    Patient Name: Tasia Cardona  MRN: [id number]  Patient Class: IP- Inpatient   Admission Date: 4/28/2025  Length of Stay: 1 days  Attending Physician: Allan Jones MD  Primary Care Provider: Pina Jones NP        Subjective     Principal Problem:Acute on chronic pancreatitis        HPI:  Mr. Tasia Cardona is a 24 y.o. male, with PMH of chronic pancreatitis, prior necrotizing pancreatitis, alcohol use disorder, Corinne-hitchcock tear, splenic vein thrombosis, HIV, asthma, anemia, polycythemia vera,  who presented to [facility] ED on 4/28/25 due to abdominal pain, with nausea and vomiting x 1 day. He states he thinks this is similar to prior episodes of pancreatitis, and that it may be 2/2 food he consumed as he denied drinking alcohol. Prior notes indicate he usually denies alcohol intake associated with his episodes of pancreatitis despite his documented heave alcohol consumption. He notes also associated diarrhea. He is still able to tolerate some oral intake. He denied fever, chills, sweats. He was evaluated in the ED with labs showing H&H of 12.0/37.9, without leukocytosis or left shift. A metabolic panel showed potassium of 3.0, without further significant abnormalities. Lipase was not elevated. Serum alcohol was elevated at 191.     Overview/Hospital Course:  Patient is a 24-year-old man with history of alcohol-induced pancreatitis with chronic pancreatitis ongoing alcohol use disorder admitted with evidence acute on chronic alcohol-induced pancreatitis.    Interval History:  Patient reports pain not adequately controlled.    Review of Systems   Constitutional:  Negative for chills and fever.   Respiratory:  Negative for shortness of breath.    Cardiovascular:  Negative for chest pain.   Gastrointestinal:  Positive for abdominal pain. Negative for constipation, diarrhea, nausea and vomiting.     Objective:     Vital Signs (Most  Recent):  Temp: 98.5 °F (36.9 °C) (04/29/25 1645)  Pulse: 94 (04/29/25 1645)  Resp: 16 (04/29/25 1645)  BP: (!) 144/68 (04/29/25 1645)  SpO2: 98 % (04/29/25 1645) Vital Signs (24h Range):  Temp:  [97.6 °F (36.4 °C)-98.5 °F (36.9 °C)] 98.5 °F (36.9 °C)  Pulse:  [54-96] 94  Resp:  [16-20] 16  SpO2:  [96 %-99 %] 98 %  BP: (116-144)/(68-86) 144/68     Weight: 80.2 kg (176 lb 12.9 oz)  Body mass index is 25.37 kg/m².    Intake/Output Summary (Last 24 hours) at 4/29/2025 1833  Last data filed at 4/29/2025 0515  Gross per 24 hour   Intake 2652.69 ml   Output --   Net 2652.69 ml         Physical Exam  Cardiovascular:      Rate and Rhythm: Normal rate and regular rhythm.      Heart sounds: Normal heart sounds. No murmur heard.     No friction rub. No gallop.   Pulmonary:      Effort: Pulmonary effort is normal. No respiratory distress.      Breath sounds: Normal breath sounds. No wheezing or rales.   Abdominal:      General: Bowel sounds are normal. There is no distension.      Palpations: Abdomen is soft.      Tenderness: There is abdominal tenderness.   Musculoskeletal:         General: No tenderness. Normal range of motion.   Skin:     General: Skin is warm and dry.      Coloration: Skin is not pale.      Findings: No erythema or rash.   Neurological:      Mental Status: He is alert and oriented to person, place, and time.               Significant Labs: All pertinent labs within the past 24 hours have been reviewed.    Significant Imaging: I have reviewed all pertinent imaging results/findings within the past 24 hours.      Assessment & Plan  Acute on chronic pancreatitis  Secondary to ongoing alcohol use.  Patient counseled importance of abstaining further alcohol use.  Monitor for withdrawal.  Intravenous fluids.  Increase pain for better pain control.  Correct electrolytes.  Alcohol use disorder  Patient clearly in denial or dishonest about his ongoing alcohol use. Phosphatidylethanol level very high and repeat  alcohol levels on presentation elevated despite denies use of alcohol use.  Recommend substance abuse treatment program.  Pancreatic pseudocyst/cyst  Patient with multiple recent CT scans.  Hold off on CT scan unless he has change in his physical exam or develops fever.  HIV infection  - continue home meds  - follow up with [facility] or your PCP  Mild intermittent asthma without complication  - No current symptoms to indicate exacerbation  - monitor & treat PRN     VTE Risk Mitigation (From admission, onward)           Ordered     enoxaparin injection 40 mg  Daily         04/29/25 1839     IP VTE HIGH RISK PATIENT  Once         04/29/25 1839     Place sequential compression device  Until discontinued         04/28/25 5334                    Allan Jones MD  Department of Hospital Medicine   [facility] - Med Surg ([address])

## 2025-04-29 NOTE — PLAN OF CARE
Case Management Assessment     PCP: None, Patient sees oncologist Dr. Roxy Brower for medical appointments.  [facility]     Patient Arrived From: Home  Existing Help at Home: None    Barriers to Discharge: None    Discharge Plan:    A. Home   B. Home    SW met with the patient to complete the initial assessment. Patient lives alone. Patient is independent with all ADL's. No HH/HD/DME. Patient plans to return home once medically stable for discharge and will use uber for transportation at discharge.            [facility] - Med Surg ([address])  Initial Discharge Assessment       Primary Care Provider: Pina Jones NP    Admission Diagnosis: Alcohol abuse [F10.10]  Acute on chronic pancreatitis [K85.90, K86.1]    Admission Date: 4/28/2025  Expected Discharge Date:     Transition of Care Barriers: (P) None    Payor: MEDICAID / Plan: AETNA [facility] / Product Type: Managed Medicaid /     Extended Emergency Contact Information  Primary Emergency Contact: Denice Cardona  Mobile Phone: [phone]  Relation: Mother  Preferred language: English   needed? No    Discharge Plan A: (P) Home  Discharge Plan B: (P) Home      [facility] [address]  [address] [facility] [place] [address]  Phone: [phone] Fax: [phone]    [facility]  [address] [facility] [address]  [facility] [address]  Phone: [phone] Fax: [phone]    [facility]  [address] [facility] [address]  [facility] [address]  Phone: [phone] Fax: [phone]    [facility]  [address] [facility] [address]  Phone: [phone] Fax: [phone]      Initial Assessment (most recent)       Adult Discharge Assessment - 04/29/25 1153          Discharge Assessment    Assessment Type Discharge Planning Assessment     Confirmed/corrected address, phone number and insurance Yes     Confirmed Demographics Correct on  Facesheet     Source of Information patient     When was your last doctors appointment? --   Pt states he doesn't have a PCP. Pt sees oncologist Dr. Roxy Daniels for medical appointements. Last appointment last month    Does patient/caregiver understand observation status Yes     Communicated CASTILLO with patient/caregiver Date not available/Unable to determine     Reason For Admission Abdominal pain (P)      People in Home alone (P)      Facility Arrived From: home (P)      Do you expect to return to your current living situation? Yes (P)      Do you have help at home or someone to help you manage your care at home? No (P)      Current cognitive status: Alert/Oriented (P)      Dressing/Bathing Difficulty no (P)      Equipment Currently Used at Home none (P)      Readmission within 30 days? No (P)      Patient currently being followed by outpatient case management? No (P)      Do you currently have service(s) that help you manage your care at home? No (P)      Do you take prescription medications? Yes (P)      Do you have prescription coverage? Yes (P)      Coverage MEDICAID - AETNA [facility] [place] (P)      Do you have any problems affording any of your prescribed medications? No (P)      Is the patient taking medications as prescribed? yes (P)      Who is going to help you get home at discharge? patient plans to use uber (P)      How do you get to doctors appointments? other (see comments) (P)    uber    Are you on dialysis? No (P)      Do you take coumadin? No (P)      Discharge Plan A Home (P)      Discharge Plan B Home (P)      DME Needed Upon Discharge  none (P)      Discharge Plan discussed with: Patient (P)      Transition of Care Barriers None (P)

## 2025-04-29 NOTE — SUBJECTIVE & OBJECTIVE
Interval History:  Patient reports pain not adequately controlled.    Review of Systems   Constitutional:  Negative for chills and fever.   Respiratory:  Negative for shortness of breath.    Cardiovascular:  Negative for chest pain.   Gastrointestinal:  Positive for abdominal pain. Negative for constipation, diarrhea, nausea and vomiting.     Objective:     Vital Signs (Most Recent):  Temp: 98.5 °F (36.9 °C) (04/29/25 1645)  Pulse: 94 (04/29/25 1645)  Resp: 16 (04/29/25 1645)  BP: (!) 144/68 (04/29/25 1645)  SpO2: 98 % (04/29/25 1645) Vital Signs (24h Range):  Temp:  [97.6 °F (36.4 °C)-98.5 °F (36.9 °C)] 98.5 °F (36.9 °C)  Pulse:  [54-96] 94  Resp:  [16-20] 16  SpO2:  [96 %-99 %] 98 %  BP: (116-144)/(68-86) 144/68     Weight: 80.2 kg (176 lb 12.9 oz)  Body mass index is 25.37 kg/m².    Intake/Output Summary (Last 24 hours) at 4/29/2025 1833  Last data filed at 4/29/2025 0515  Gross per 24 hour   Intake 2652.69 ml   Output --   Net 2652.69 ml         Physical Exam  Cardiovascular:      Rate and Rhythm: Normal rate and regular rhythm.      Heart sounds: Normal heart sounds. No murmur heard.     No friction rub. No gallop.   Pulmonary:      Effort: Pulmonary effort is normal. No respiratory distress.      Breath sounds: Normal breath sounds. No wheezing or rales.   Abdominal:      General: Bowel sounds are normal. There is no distension.      Palpations: Abdomen is soft.      Tenderness: There is abdominal tenderness.   Musculoskeletal:         General: No tenderness. Normal range of motion.   Skin:     General: Skin is warm and dry.      Coloration: Skin is not pale.      Findings: No erythema or rash.   Neurological:      Mental Status: He is alert and oriented to person, place, and time.               Significant Labs: All pertinent labs within the past 24 hours have been reviewed.    Significant Imaging: I have reviewed all pertinent imaging results/findings within the past 24 hours.

## 2025-04-29 NOTE — ASSESSMENT & PLAN NOTE
Patient with multiple recent CT scans.  Hold off on CT scan unless he has change in his physical exam or develops fever.   English

## 2025-04-29 NOTE — NURSING
"Patient c/o 9/10 abdominal pain. Patient received Percocet 10mg @ 0015. Offered PRN Bentyl 20mg IM but patient is refusing. Patient is specifically asking for dilaudid, stating " no I don't want Bentyl, I want what I had downstairs , its the only thing that works.". JADE Mckinley notified of patient's pain level and medication request. No new orders given at this time.   "

## 2025-04-29 NOTE — ASSESSMENT & PLAN NOTE
Secondary to ongoing alcohol use.  Patient counseled importance of abstaining further alcohol use.  Monitor for withdrawal.  Intravenous fluids.  Increase pain for better pain control.  Correct electrolytes.

## 2025-04-29 NOTE — HPI
"Per Jennyfer Mckinley PA-C:    "Mr. Tasia Cardona is a 24 y.o. male, with PMH of chronic pancreatitis, prior necrotizing pancreatitis, alcohol use disorder, Corinne-hitchcock tear, splenic vein thrombosis, HIV, asthma, anemia, polycythemia vera,  who presented to [facility] ED on 4/28/25 due to abdominal pain, with nausea and vomiting x 1 day. He states he thinks this is similar to prior episodes of pancreatitis, and that it may be 2/2 food he consumed as he denied drinking alcohol. Prior notes indicate he usually denies alcohol intake associated with his episodes of pancreatitis despite his documented heave alcohol consumption. He notes also associated diarrhea. He is still able to tolerate some oral intake. He denied fever, chills, sweats. He was evaluated in the ED with labs showing H&H of 12.0/37.9, without leukocytosis or left shift. A metabolic panel showed potassium of 3.0, without further significant abnormalities. Lipase was not elevated. Serum alcohol was elevated at 191."  "

## 2025-04-29 NOTE — ASSESSMENT & PLAN NOTE
- Mr. Tasia Cardona presents with abdominal pain associated with nausea and vomiting   - He has h/o alcoholic pancreatitis, pancreatic pseudocyst w/ prior portal vein thrombosis (no longer anticoagulated)  - labs no c/w pancreatitis   - CT ordered, but patient refused initially, later agreed; results pending  - continue PRN bentyl for cramping, percocet PO for pain, and promethazine (1st choice), and compazine/benadryl combo (2nd choice) for nausea   - continue IV hydration w/ LR   - replaced lytes PRN   - monitor

## 2025-04-29 NOTE — ASSESSMENT & PLAN NOTE
- reported in the ED he did not drink alcohol  - serum ethanol indicated other wise with elevation to 191  - CIWA scales and PRNs ordered for withdrawal

## 2025-04-29 NOTE — ASSESSMENT & PLAN NOTE
Patient clearly in denial or dishonest about his ongoing alcohol use. Phosphatidylethanol level very high and repeat alcohol levels on presentation elevated despite denies use of alcohol use.  Recommend substance abuse treatment program.

## 2025-04-29 NOTE — NURSING
"Patient arrived to floor from ER. Patient c/o pain 9/10 and  nausea. Patient also requesting 3 cartons of milk, stated " the doctor told me I could have milk". Patient has Percocet ordered for pain. Patient states he's afraid he wont be able to keep the pill down and would like dilaudid as that's what he received in ER. JADE Mckinley notified of patient's concerns. Patient is on a clear liquid diet. Orders to give Percocet at this time after antiemetic to see if patient can tolerate PO meds. No new orders at this time.  "

## 2025-04-29 NOTE — ED PROVIDER NOTES
Source of History:  Patient     Chief complaint:  Pancreatitis (Pt c/o vomiting, diarrhea and belly pain that feels just like a pancreatitis flare. States that he thinks it may have been caused by something he at last night. Denies increased alcohol consumption.)      HPI:  Tasia Cardona is a 24 y.o. male presenting to the emergency department reporting intractable nausea vomiting upper abdominal pain that began yesterday, history of pancreatitis from ETOH use.  Reports this feels similar.  Patient denies recent alcohol intake however extensive chart review reveals patient is a heavy alcohol user than always denies drinking.  He also reports some mild diarrhea.  Denies any fever/chills.  Reports he is able tolerate some oral intake.    This is the extent to the patients complaints today here in the emergency department.    PMH:  As per HPI and below:  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     Past Surgical History:   Procedure Laterality Date    ENDOSCOPIC ULTRASOUND OF UPPER GASTROINTESTINAL TRACT N/A 10/23/2023    Procedure: ULTRASOUND, UPPER GI TRACT, ENDOSCOPIC;  Surgeon: Emil Villatoro MD;  Location: [address]);  Service: Endoscopy;  Laterality: N/A;    ENDOSCOPIC ULTRASOUND OF UPPER GASTROINTESTINAL TRACT N/A 7/24/2024    Procedure: ULTRASOUND, UPPER GI TRACT, ENDOSCOPIC;  Surgeon: Faustino Trujillo MD;  Location: [facility] ([address] [place] [address]);  Service: Endoscopy;  Laterality: N/A;    ERCP N/A 10/23/2023    Procedure: ERCP (ENDOSCOPIC  "RETROGRADE CHOLANGIOPANCREATOGRAPHY);  Surgeon: Emil Villatoro MD;  Location: [facility] ENDO (2ND FLR);  Service: Endoscopy;  Laterality: N/A;    ESOPHAGOGASTRODUODENOSCOPY N/A 9/18/2023    Procedure: EGD (ESOPHAGOGASTRODUODENOSCOPY);  Surgeon: Kg Cleaning MD;  Location: [facility] ENDO (2ND FLR);  Service: Endoscopy;  Laterality: N/A;    ESOPHAGOGASTRODUODENOSCOPY N/A 3/8/2024    Procedure: EGD (ESOPHAGOGASTRODUODENOSCOPY);  Surgeon: Greg Love MD;  Location: [facility] ENDO (2ND FLR);  Service: Endoscopy;  Laterality: N/A;    ESOPHAGOGASTRODUODENOSCOPY N/A 7/24/2024    Procedure: EGD (ESOPHAGOGASTRODUODENOSCOPY);  Surgeon: Faustino Trujillo MD;  Location: [facility] ENDO (2ND FLR);  Service: Endoscopy;  Laterality: N/A;       Social History[1]    Review of patient's allergies indicates:   Allergen Reactions    [place] Swelling    Droperidol Hives    Morphine     Pecan nut Swelling    Penicillins Hives     Tolerates cephalosporins    Tolerated piperacillin/tazobactam 11/2023    Sulfa (sulfonamide antibiotics) Hives    Tolonium chloride(toluidine blue-o) Hives, Itching and Rash    Toradol [ketorolac] Rash       ROS: As per HPI and below:  General: No  fever.  No chills.    ENT: No sore throat. No ear pain  Chest: No shortness of breath. No cough.    Cardiovascular: No chest pain.   Abdomen:  Nausea vomiting, diarrhea, abdominal pain  Genito-Urinary: No abnormal urination.    Neurologic: No focal weakness.  No numbness.  No headache  MSK: no back pain.   Integument: No rashes or lesions.    Physical Exam:    /74 (BP Location: Left arm)   Pulse 100   Temp 98.3 °F (36.8 °C) (Oral)   Resp 19   Ht 5' 10" (1.778 m)   Wt 77.1 kg (170 lb)   SpO2 99%   BMI 24.39 kg/m²   Vitals:    04/28/25 1808 04/28/25 2005 04/28/25 2053   BP: 115/74     Pulse: 100     Resp: 18 20 19   Temp: 98.3 °F (36.8 °C)     TempSrc: Oral     SpO2: 99%     Weight: 77.1 kg (170 lb)     Height: 5' 10" (1.778 m)         Nursing note and vital signs " reviewed.  Appearance: No acute distress.  Disheveled.  Eyes: No conjunctival injection.  Extraocular muscles are intact.  ENT:  Mucous membranes are pink and moist  Chest/ Respiratory: Clear to auscultation bilaterally.  Good air movement.  No wheezes.  No rhonchi. No rales. No accessory muscle use.  Cardiovascular: Regular rate and rhythm.  No murmurs. No gallops. No rubs.  Abdomen: Soft.  Epigastric tenderness with guarding.  No distention.  Back:  No CVA tenderness  Musculoskeletal: Good range of motion all joints.  No deformities.  Neck supple.  No meningismus.  Skin: No rashes seen.  Good turgor.  No abrasions.  No ecchymoses.  Neuro: alert and oriented x3,  no focal neurological deficits.  Psych: Appropriate, conversant    Abnormal Labs Reviewed   COMPREHENSIVE METABOLIC PANEL - Abnormal; Notable for the following components:       Result Value    Potassium 3.0 (*)     Chloride 111 (*)     CO2 20 (*)     Glucose 120 (*)     Calcium 8.6 (*)     All other components within normal limits   CBC WITH DIFFERENTIAL - Abnormal; Notable for the following components:    HGB 12.0 (*)     HCT 37.9 (*)     MCV 81 (*)     MCH 25.5 (*)     MCHC 31.7 (*)     RDW 20.4 (*)     Neut % 34.1 (*)     Lymph % 48.4 (*)     Neut # 1.49 (*)     All other components within normal limits   ALCOHOL,MEDICAL (ETHANOL) - Abnormal; Notable for the following components:    Alcohol, Serum 191 (*)     All other components within normal limits       No orders to display         Initial Impression/ Differential Dx:  Differential diagnosis includes but not limited to: GERD, gastroenteritis, gastritis, ulcer, cholecystitis, cholelithiasis, pancreatitis,      MDM:    Medical Decision Making  24-year-old male with alcohol abuse and history of pancreatitis presents for evaluation of upper abdominal pain with intractable nausea vomiting for the last day, also endorse mild diarrhea.  No fever.  Afebrile on arrival.  Labs were reassuring with no  leukocytosis, stable H&H, hypokalemia which will be replaced orally.  Lipase with a normal limits consistent with his acute on chronic pancreatitis.  Treated with multiple rounds of IV Dilaudid with a minimal improvement in symptoms.  Plan for admission to Hospital Medicine for continued treatment of his pancreatitis.    Amount and/or Complexity of Data Reviewed  Labs:  Decision-making details documented in ED Course.    Risk  OTC drugs.  Prescription drug management.  Decision regarding hospitalization.        ED Course as of 04/28/25 2158 Mon Apr 28, 2025 1933 WBC: 4.38 [AS]   1933 Hematocrit(!): 37.9 [AS]   1933 Hemoglobin(!): 12.0 [AS]   1933 Platelet Count: 215 [AS]      ED Course User Index  [AS] Anne Haywood FNP       Diagnostic Impression:    1. Acute on chronic pancreatitis    2. Alcohol abuse         ED Disposition Condition    Admit                         [1]   Social History  Tobacco Use    Smoking status: Former     Types: Cigarettes    Smokeless tobacco: Never   Substance Use Topics    Alcohol use: Not Currently    Drug use: Yes     Types: Marijuana        Anne Haywood FNP  04/28/25 2158

## 2025-04-29 NOTE — PLAN OF CARE
Pt. AAOx4, VSS. Tele monitored. IVF infusing. Urine culture still needed. Q4 CIWA. Safety measures in place. Hourly rounding completed.      Problem: Adult Inpatient Plan of Care  Goal: Plan of Care Review  Outcome: Progressing  Goal: Patient-Specific Goal (Individualized)  Outcome: Progressing  Goal: Absence of Hospital-Acquired Illness or Injury  Outcome: Progressing  Goal: Optimal Comfort and Wellbeing  Outcome: Progressing  Goal: Readiness for Transition of Care  Outcome: Progressing

## 2025-04-29 NOTE — SUBJECTIVE & OBJECTIVE
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       Past Surgical History:   Procedure Laterality Date    ENDOSCOPIC ULTRASOUND OF UPPER GASTROINTESTINAL TRACT N/A 10/23/2023    Procedure: ULTRASOUND, UPPER GI TRACT, ENDOSCOPIC;  Surgeon: Emil Villatoro MD;  Location: [facility] ([address] [place] [address]);  Service: Endoscopy;  Laterality: N/A;    ENDOSCOPIC ULTRASOUND OF UPPER GASTROINTESTINAL TRACT N/A 7/24/2024    Procedure: ULTRASOUND, UPPER GI TRACT, ENDOSCOPIC;  Surgeon: Faustino Trujillo MD;  Location: [facility] ([address] [place] [address]);  Service: Endoscopy;  Laterality: N/A;    ERCP N/A 10/23/2023    Procedure: ERCP (ENDOSCOPIC RETROGRADE CHOLANGIOPANCREATOGRAPHY);  Surgeon: Emil Villatoro MD;  Location: [facility] (2ND FLR);  Service: Endoscopy;  Laterality: N/A;    ESOPHAGOGASTRODUODENOSCOPY N/A 9/18/2023    Procedure: EGD (ESOPHAGOGASTRODUODENOSCOPY);  Surgeon: Kg Cleaning MD;  [facility]  Service: Endoscopy;  Laterality: N/A;    ESOPHAGOGASTRODUODENOSCOPY N/A 3/8/2024    Procedure: EGD (ESOPHAGOGASTRODUODENOSCOPY);  Surgeon: Greg Love MD;  Location: [facility] ENDO (2ND FLR);  Service: Endoscopy;  Laterality: N/A;    ESOPHAGOGASTRODUODENOSCOPY N/A 7/24/2024    Procedure: EGD (ESOPHAGOGASTRODUODENOSCOPY);  Surgeon: Faustino Trujillo MD;  Location: [facility] ENDO (2ND FLR);  Service: Endoscopy;  Laterality: N/A;       Review of patient's allergies indicates:   Allergen Reactions    [place] Swelling    Droperidol Hives    Pecan nut Swelling     Penicillins Hives     Tolerates cephalosporins    Tolerated piperacillin/tazobactam 11/2023    Sulfa (sulfonamide antibiotics) Hives    Tolonium chloride(toluidine blue-o) Hives, Itching and Rash    Toradol [ketorolac] Rash       No current facility-administered medications on file prior to encounter.     Current Outpatient Medications on File Prior to Encounter   Medication Sig    acetaminophen (TYLENOL) 500 MG tablet Take 2 tablets (1,000 mg total) by mouth every 8 (eight) hours as needed for Pain.    kvqiwfefz-cucsxigz-dxjvzgv ala (BIKTARVY) -25 mg (25 kg or greater) Take 1 tablet by mouth once daily.    ondansetron (ZOFRAN-ODT) 4 MG TbDL Take 1 tablet (4 mg total) by mouth every 8 (eight) hours as needed (nausea/vomiting).    senna-docusate (PERICOLACE) 8.6-50 mg per tablet Take 1 tablet by mouth 2 (two) times daily.     Family History       Problem Relation (Age of Onset)    Breast cancer Maternal Grandmother    Cancer Mother, Brother    No Known Problems Father    Ovarian cancer Mother    Pancreatitis Mother          Tobacco Use    Smoking status: Former     Types: Cigarettes    Smokeless tobacco: Never   Substance and Sexual Activity    Alcohol use: Not Currently    Drug use: Yes     Types: Marijuana    Sexual activity: Not on file     Review of Systems   Constitutional:  Positive for appetite change, chills and diaphoresis. Negative for fever.   Respiratory:  Negative for cough, shortness of breath and wheezing.    Cardiovascular:  Negative for chest pain and palpitations.   Gastrointestinal:  Positive for abdominal pain, nausea and vomiting. Negative for constipation and diarrhea.   Genitourinary:  Negative for decreased urine volume, difficulty urinating, dysuria, frequency, hematuria and urgency.   Musculoskeletal:  Negative for back pain, neck pain and neck stiffness.   Skin:  Negative for color change and pallor.   Neurological:  Negative for dizziness, syncope, weakness, light-headedness and  headaches.   Hematological:  Does not bruise/bleed easily.   Psychiatric/Behavioral:  Negative for agitation and confusion.      Objective:     Vital Signs (Most Recent):  Temp: 97.6 °F (36.4 °C) (04/29/25 0331)  Pulse: 77 (04/29/25 0331)  Resp: 18 (04/29/25 0331)  BP: 116/86 (04/29/25 0331)  SpO2: 98 % (04/29/25 0331) Vital Signs (24h Range):  Temp:  [97.6 °F (36.4 °C)-98.3 °F (36.8 °C)] 97.6 °F (36.4 °C)  Pulse:  [] 77  Resp:  [17-20] 18  SpO2:  [96 %-99 %] 98 %  BP: (115-130)/(74-86) 116/86     Weight: 80.2 kg (176 lb 12.9 oz)  Body mass index is 25.37 kg/m².     Physical Exam  Vitals and nursing note reviewed.   Constitutional:       General: He is not in acute distress.     Appearance: Normal appearance. He is well-developed and normal weight. He is not ill-appearing, toxic-appearing or diaphoretic.   HENT:      Head: Normocephalic and atraumatic.      Right Ear: External ear normal.      Left Ear: External ear normal.   Eyes:      General: No scleral icterus.        Right eye: No discharge.         Left eye: No discharge.      Conjunctiva/sclera: Conjunctivae normal.   Neck:      Vascular: No JVD.      Trachea: No tracheal deviation.   Cardiovascular:      Rate and Rhythm: Normal rate and regular rhythm.      Heart sounds: Normal heart sounds. No murmur heard.     No gallop.   Pulmonary:      Effort: Pulmonary effort is normal. No respiratory distress.      Breath sounds: Normal breath sounds. No stridor. No wheezing or rales.   Abdominal:      General: Bowel sounds are normal. There is no distension.      Palpations: Abdomen is soft. There is no mass.      Tenderness: There is abdominal tenderness (upper quadrants). There is no guarding.   Musculoskeletal:         General: No deformity. Normal range of motion.      Cervical back: Normal range of motion and neck supple.   Skin:     General: Skin is warm and dry.   Neurological:      General: No focal deficit present.      Mental Status: He is alert and  "oriented to person, place, and time.      Cranial Nerves: No cranial nerve deficit.      Motor: No abnormal muscle tone.      Coordination: Coordination normal.   Psychiatric:         Mood and Affect: Mood normal.         Behavior: Behavior normal.         Thought Content: Thought content normal.         Judgment: Judgment normal.                Significant Labs: All pertinent labs within the past 24 hours have been reviewed.  BMP:   Recent Labs   Lab 04/29/25  0354      K 3.1*      CO2 20*   BUN 7   CREATININE 0.7   CALCIUM 7.7*   MG 1.7     CBC:   Recent Labs   Lab 04/28/25 1913 04/29/25  0354   WBC 4.38 3.63*   HGB 12.0* 10.4*   HCT 37.9* 33.7*    158     CMP:   Recent Labs   Lab 04/28/25 2024 04/29/25  0354    138   K 3.0* 3.1*   * 109   CO2 20* 20*   BUN 7 7   CREATININE 0.8 0.7   CALCIUM 8.6* 7.7*   ALBUMIN 3.5 3.1*   BILITOT 0.2 0.3   ALKPHOS 84 72   AST 37 31   ALT 33 30   ANIONGAP 12 9     Urine Culture: No results for input(s): "LABURIN" in the last 48 hours.  Urine Studies: No results for input(s): "COLORU", "APPEARANCEUA", "PHUR", "SPECGRAV", "PROTEINUA", "GLUCUA", "KETONESU", "BILIRUBINUA", "OCCULTUA", "NITRITE", "UROBILINOGEN", "LEUKOCYTESUR", "RBCUA", "WBCUA", "BACTERIA", "SQUAMEPITHEL", "HYALINECASTS" in the last 48 hours.    Invalid input(s): "WRIGHTSUR"    Significant Imaging: I have reviewed all pertinent imaging results/findings within the past 24 hours.  Imaging Results              CT Abdomen Pelvis With IV Contrast NO Oral Contrast (No Result on File)                    "

## 2025-04-29 NOTE — HOSPITAL COURSE
Patient is a 24-year-old man with history of alcohol-induced pancreatitis with chronic pancreatitis ongoing alcohol use disorder admitted with evidence acute on chronic alcohol-induced pancreatitis.  Patient treated with intravenous pain medication and intravenous fluids along with electrolyte replacement as needed.  Patient clinically improved.  Oral diet successfully advanced.  Suspect also likely alcohol-induced gastritis from ongoing alcohol use.    Patient counseled that he needs to abstain from alcohol.  Despite denial of alcohol use disorder he has had multiple admissions with elevated alcohol levels in his blood and also PETH level consistent with heavy alcohol consumption.    Patient with poor insight to the seriousness of his alcohol use disorder.  Prior multiple patient efforts to quit alcohol on his own have not been effective.  Patient referred to addiction Psychiatry for professional assistance for treatment of alcoholism.  If outpatient treatment not successful at maintaining sobriety than would recommend a residential treatment program.  Serious risks related to ongoing alcohol use discussed.    Close outpatient follow up with primary care provider advised.

## 2025-04-30 ENCOUNTER — TELEPHONE (OUTPATIENT)
Dept: INFECTIOUS DISEASES | Facility: CLINIC | Age: 25
End: 2025-04-30
Payer: MEDICAID

## 2025-04-30 VITALS
WEIGHT: 176.81 LBS | DIASTOLIC BLOOD PRESSURE: 90 MMHG | HEART RATE: 81 BPM | BODY MASS INDEX: 25.31 KG/M2 | TEMPERATURE: 98 F | SYSTOLIC BLOOD PRESSURE: 120 MMHG | RESPIRATION RATE: 16 BRPM | OXYGEN SATURATION: 97 % | HEIGHT: 70 IN

## 2025-04-30 DIAGNOSIS — K86.1 ACUTE ON CHRONIC PANCREATITIS: Primary | ICD-10-CM

## 2025-04-30 DIAGNOSIS — K85.90 ACUTE ON CHRONIC PANCREATITIS: Primary | ICD-10-CM

## 2025-04-30 LAB
ABSOLUTE EOSINOPHIL (OHS): 0.13 K/UL
ABSOLUTE MONOCYTE (OHS): 0.36 K/UL (ref 0.3–1)
ABSOLUTE NEUTROPHIL COUNT (OHS): 1.01 K/UL (ref 1.8–7.7)
ALBUMIN SERPL BCP-MCNC: 2.9 G/DL (ref 3.5–5.2)
ALP SERPL-CCNC: 83 UNIT/L (ref 40–150)
ALT SERPL W/O P-5'-P-CCNC: 24 UNIT/L (ref 10–44)
ANION GAP (OHS): 10 MMOL/L (ref 8–16)
AST SERPL-CCNC: 25 UNIT/L (ref 11–45)
BASOPHILS # BLD AUTO: 0.02 K/UL
BASOPHILS NFR BLD AUTO: 0.6 %
BILIRUB SERPL-MCNC: 0.3 MG/DL (ref 0.1–1)
BUN SERPL-MCNC: 8 MG/DL (ref 6–20)
CALCIUM SERPL-MCNC: 8.4 MG/DL (ref 8.7–10.5)
CHLORIDE SERPL-SCNC: 107 MMOL/L (ref 95–110)
CO2 SERPL-SCNC: 21 MMOL/L (ref 23–29)
CREAT SERPL-MCNC: 0.7 MG/DL (ref 0.5–1.4)
ERYTHROCYTE [DISTWIDTH] IN BLOOD BY AUTOMATED COUNT: 19.9 % (ref 11.5–14.5)
GFR SERPLBLD CREATININE-BSD FMLA CKD-EPI: >60 ML/MIN/1.73/M2
GLUCOSE SERPL-MCNC: 165 MG/DL (ref 70–110)
HCT VFR BLD AUTO: 33.4 % (ref 40–54)
HGB BLD-MCNC: 10.2 GM/DL (ref 14–18)
IMM GRANULOCYTES # BLD AUTO: 0 K/UL (ref 0–0.04)
IMM GRANULOCYTES NFR BLD AUTO: 0 % (ref 0–0.5)
LYMPHOCYTES # BLD AUTO: 1.81 K/UL (ref 1–4.8)
MAGNESIUM SERPL-MCNC: 1.6 MG/DL (ref 1.6–2.6)
MCH RBC QN AUTO: 25.1 PG (ref 27–31)
MCHC RBC AUTO-ENTMCNC: 30.5 G/DL (ref 32–36)
MCV RBC AUTO: 82 FL (ref 82–98)
NUCLEATED RBC (/100WBC) (OHS): 0 /100 WBC
PHOSPHATE SERPL-MCNC: 4.5 MG/DL (ref 2.7–4.5)
PLATELET # BLD AUTO: 147 K/UL (ref 150–450)
PMV BLD AUTO: 10 FL (ref 9.2–12.9)
POTASSIUM SERPL-SCNC: 4 MMOL/L (ref 3.5–5.1)
PROT SERPL-MCNC: 5.7 GM/DL (ref 6–8.4)
RBC # BLD AUTO: 4.06 M/UL (ref 4.6–6.2)
RELATIVE EOSINOPHIL (OHS): 3.9 %
RELATIVE LYMPHOCYTE (OHS): 54.4 % (ref 18–48)
RELATIVE MONOCYTE (OHS): 10.8 % (ref 4–15)
RELATIVE NEUTROPHIL (OHS): 30.3 % (ref 38–73)
SODIUM SERPL-SCNC: 138 MMOL/L (ref 136–145)
WBC # BLD AUTO: 3.33 K/UL (ref 3.9–12.7)

## 2025-04-30 PROCEDURE — 83735 ASSAY OF MAGNESIUM: CPT | Performed by: HOSPITALIST

## 2025-04-30 PROCEDURE — 25000003 PHARM REV CODE 250: Performed by: PHYSICIAN ASSISTANT

## 2025-04-30 PROCEDURE — 85025 COMPLETE CBC W/AUTO DIFF WBC: CPT | Performed by: HOSPITALIST

## 2025-04-30 PROCEDURE — 63600175 PHARM REV CODE 636 W HCPCS: Performed by: PHYSICIAN ASSISTANT

## 2025-04-30 PROCEDURE — 84100 ASSAY OF PHOSPHORUS: CPT | Performed by: HOSPITALIST

## 2025-04-30 PROCEDURE — 84075 ASSAY ALKALINE PHOSPHATASE: CPT | Performed by: HOSPITALIST

## 2025-04-30 PROCEDURE — 36415 COLL VENOUS BLD VENIPUNCTURE: CPT | Performed by: HOSPITALIST

## 2025-04-30 PROCEDURE — 63600175 PHARM REV CODE 636 W HCPCS: Performed by: HOSPITALIST

## 2025-04-30 RX ORDER — LANOLIN ALCOHOL/MO/W.PET/CERES
100 CREAM (GRAM) TOPICAL DAILY
Qty: 30 TABLET | Refills: 0 | Status: SHIPPED | OUTPATIENT
Start: 2025-05-01

## 2025-04-30 RX ORDER — PANTOPRAZOLE SODIUM 40 MG/1
40 TABLET, DELAYED RELEASE ORAL DAILY
Qty: 30 TABLET | Refills: 0 | Status: SHIPPED | OUTPATIENT
Start: 2025-04-30

## 2025-04-30 RX ORDER — ONDANSETRON 4 MG/1
8 TABLET, ORALLY DISINTEGRATING ORAL EVERY 12 HOURS PRN
Qty: 28 TABLET | Refills: 0 | Status: SHIPPED | OUTPATIENT
Start: 2025-04-30 | End: 2025-05-07

## 2025-04-30 RX ORDER — FOLIC ACID 1 MG/1
1 TABLET ORAL DAILY
Qty: 30 TABLET | Refills: 0 | Status: SHIPPED | OUTPATIENT
Start: 2025-05-01

## 2025-04-30 RX ORDER — OXYCODONE HYDROCHLORIDE 5 MG/1
5 TABLET ORAL EVERY 8 HOURS PRN
Qty: 21 TABLET | Refills: 0 | Status: ON HOLD | OUTPATIENT
Start: 2025-04-30 | End: 2025-05-07 | Stop reason: HOSPADM

## 2025-04-30 RX ORDER — SENNOSIDES 8.6 MG/1
1 TABLET ORAL DAILY
Qty: 30 TABLET | Refills: 0 | Status: SHIPPED | OUTPATIENT
Start: 2025-04-30

## 2025-04-30 RX ADMIN — HYDROMORPHONE HYDROCHLORIDE 2 MG: 2 INJECTION INTRAMUSCULAR; INTRAVENOUS; SUBCUTANEOUS at 08:04

## 2025-04-30 RX ADMIN — FOLIC ACID 1 MG: 1 TABLET ORAL at 08:04

## 2025-04-30 RX ADMIN — PANTOPRAZOLE SODIUM 40 MG: 40 INJECTION, POWDER, FOR SOLUTION INTRAVENOUS at 08:04

## 2025-04-30 RX ADMIN — HYDROMORPHONE HYDROCHLORIDE 2 MG: 2 INJECTION INTRAMUSCULAR; INTRAVENOUS; SUBCUTANEOUS at 04:04

## 2025-04-30 RX ADMIN — BICTEGRAVIR SODIUM, EMTRICITABINE, AND TENOFOVIR ALAFENAMIDE FUMARATE 1 TABLET: 50; 200; 25 TABLET ORAL at 08:04

## 2025-04-30 RX ADMIN — Medication 100 MG: at 08:04

## 2025-04-30 RX ADMIN — HYDROMORPHONE HYDROCHLORIDE 2 MG: 2 INJECTION INTRAMUSCULAR; INTRAVENOUS; SUBCUTANEOUS at 12:04

## 2025-04-30 RX ADMIN — PROCHLORPERAZINE EDISYLATE 10 MG: 5 INJECTION INTRAMUSCULAR; INTRAVENOUS at 12:04

## 2025-04-30 RX ADMIN — THERA TABS 1 TABLET: TAB at 08:04

## 2025-04-30 NOTE — TELEPHONE ENCOUNTER
Called patient.  He states that he was having pancrease pain yesterday.    Assessment  Multiple ER visits secondary to pancreatitis.    Plan  Schedule patient appointment with [facility].

## 2025-04-30 NOTE — TELEPHONE ENCOUNTER
----- Message from Jennifer sent at 4/30/2025  9:54 AM CDT -----  Type:  Appointment Request Name of Caller:MANSOOR LASSITER [[id number]]When is the first available appointment?No accessSymptoms:Hospital follow upWould the patient rather a call back or a response via [facility]? Sebastian Castrejon Call Back Number:[phone] Additional Information: Patient needs to be seen for a hospital follow up. Patient would like a call back with further assistance.

## 2025-04-30 NOTE — PLAN OF CARE
Problem: Adult Inpatient Plan of Care  Goal: Plan of Care Review  4/30/2025 1050 by Myla Love RN  Outcome: Met  4/30/2025 1050 by Myla Love RN  Outcome: Adequate for Care Transition  Goal: Patient-Specific Goal (Individualized)  4/30/2025 1050 by Myla Love RN  Outcome: Met  4/30/2025 1050 by Myla Love RN  Outcome: Adequate for Care Transition  Goal: Absence of Hospital-Acquired Illness or Injury  4/30/2025 1050 by Myla Love RN  Outcome: Met  4/30/2025 1050 by Myla Love RN  Outcome: Adequate for Care Transition  Goal: Optimal Comfort and Wellbeing  4/30/2025 1050 by Myla Love RN  Outcome: Met  4/30/2025 1050 by Myla Love RN  Outcome: Adequate for Care Transition  Goal: Readiness for Transition of Care  4/30/2025 1050 by Myla Love RN  Outcome: Met  4/30/2025 1050 by Myla Love RN  Outcome: Adequate for Care Transition     Problem: Infection  Goal: Absence of Infection Signs and Symptoms  4/30/2025 1050 by Myla Love, RN  Outcome: Met  4/30/2025 1050 by Myla Love RN  Outcome: Adequate for Care Transition     Discharging home, self-care. Medications filled in outpatient pharmacy.

## 2025-04-30 NOTE — PLAN OF CARE
04/30/25 0957   Final Note   Assessment Type Final Discharge Note   Anticipated Discharge Disposition Home   What phone number can be called within the next 1-3 days to see how you are doing after discharge?   ([phone])   Hospital Resources/Appts/Education Provided Provided patient/caregiver with written discharge plan information   Post-Acute Status   Discharge Delays None known at this time     Case Management Final Discharge Note    Discharge Disposition: Home    New DME ordered / company name: None    Relevant SDOH / Transition of Care Barriers:  None    Person available to provide assistance at home when needed and their contact information: self    Scheduled followup appointment:  will contact the patient to schedule [facility] follow up appointment.    Referrals placed: ID    Transportation: patient will use uber      Patient and family educated on discharge services and updated on DC plan. Bedside RN notified, patient clear to discharge from Case Management Perspective.

## 2025-05-01 ENCOUNTER — TELEPHONE (OUTPATIENT)
Dept: GASTROENTEROLOGY | Facility: CLINIC | Age: 25
End: 2025-05-01
Payer: MEDICAID

## 2025-05-01 ENCOUNTER — PATIENT OUTREACH (OUTPATIENT)
Facility: OTHER | Age: 25
End: 2025-05-01
Payer: MEDICAID

## 2025-05-01 ENCOUNTER — PATIENT MESSAGE (OUTPATIENT)
Dept: GASTROENTEROLOGY | Facility: CLINIC | Age: 25
End: 2025-05-01
Payer: MEDICAID

## 2025-05-01 PROBLEM — F10.20 ALCOHOLISM: Status: ACTIVE | Noted: 2025-05-01

## 2025-05-01 NOTE — DISCHARGE SUMMARY
"[facility] Surg [address] Medicine  Discharge Summary      Patient Name: Tasia Cardona  MRN: [id number]  [facility]: [id number]  Patient Class: IP- Inpatient  Admission Date: 4/28/2025  Hospital Length of Stay: 2 days  Discharge Date and Time: 4/30/2025 10:53 AM  Attending Physician: Allan Jones MD  Discharging Provider: Allan Jones MD      HPI:   KIN DeleonC:    "Mr. Tasia Cardona is a 24 y.o. male, with PMH of chronic pancreatitis, prior necrotizing pancreatitis, alcohol use disorder, Corinne-hitchcock tear, splenic vein thrombosis, HIV, asthma, anemia, polycythemia vera,  who presented to [facility] ED on 4/28/25 due to abdominal pain, with nausea and vomiting x 1 day. He states he thinks this is similar to prior episodes of pancreatitis, and that it may be 2/2 food he consumed as he denied drinking alcohol. Prior notes indicate he usually denies alcohol intake associated with his episodes of pancreatitis despite his documented heave alcohol consumption. He notes also associated diarrhea. He is still able to tolerate some oral intake. He denied fever, chills, sweats. He was evaluated in the ED with labs showing H&H of 12.0/37.9, without leukocytosis or left shift. A metabolic panel showed potassium of 3.0, without further significant abnormalities. Lipase was not elevated. Serum alcohol was elevated at 191."    Hospital Course:   Patient is a 24-year-old man with history of alcohol-induced pancreatitis with chronic pancreatitis ongoing alcohol use disorder admitted with evidence acute on chronic alcohol-induced pancreatitis.  Patient treated with intravenous pain medication and intravenous fluids along with electrolyte replacement as needed.  Patient clinically improved.  Oral diet successfully advanced.  Suspect also likely alcohol-induced gastritis from ongoing alcohol use.    Patient counseled that he needs to abstain from alcohol.  Despite denial of alcohol use disorder he has " had multiple admissions with elevated alcohol levels in his blood and also PETH level consistent with heavy alcohol consumption.    Patient with poor insight to the seriousness of his alcohol use disorder.  Prior multiple patient efforts to quit alcohol on his own have not been effective.  Patient referred to addiction Psychiatry for professional assistance for treatment of alcoholism.  If outpatient treatment not successful at maintaining sobriety than would recommend a residential treatment program.  Serious risks related to ongoing alcohol use discussed.    Close outpatient follow up with primary care provider advised.     Goals of Care Treatment Preferences:  Code Status: Full Code      SDOH Screening:  The patient declined to be screened for utility difficulties, food insecurity, transport difficulties, housing insecurity, and interpersonal safety, so no concerns could be identified this admission.     Consults:   Consults (From admission, onward)          Status Ordering Provider     Inpatient consult to Midline team  Once        Provider:  (Not yet assigned)    Completed GREG SPAIN            Final Active Diagnoses:    Diagnosis Date Noted POA    PRINCIPAL PROBLEM:  Alcoholism [F10.20] 05/01/2025 Yes    Alcohol use disorder [F10.90] 10/05/2023 Yes    Acute on chronic pancreatitis [K85.90, K86.1] 12/15/2023 Yes    Pancreatic pseudocyst/cyst [K86.2, K86.3] 10/24/2023 Yes    Mild intermittent asthma without complication [J45.20] 09/18/2023 Yes     Chronic    HIV infection [Z21] 11/02/2021 Yes      Problems Resolved During this Admission:       Discharged Condition: Stable    Disposition: Home or Self Care    Follow Up:   Follow-up Information       Rich Velasco MD. Schedule an appointment as soon as possible for a visit in 1 week(s).    Specialty: Infectious Diseases  Why: Message sent to Dr. Velasco's office. They will contact the patient to schedule an appointment.  Contact information:  [id number]  [facility] [address]  [phone]                           Patient Instructions:      Ambulatory referral/consult to Addiction Psychiatry   Standing Status: Future   Referral Priority: Urgent Referral Type: Psychiatric   Referral Reason: Specialty Services Required   Requested Specialty: Addiction Medicine   Number of Visits Requested: 1     Diet Adult Regular     Notify your health care provider if you experience any of the following:  temperature >100.4     Notify your health care provider if you experience any of the following:  persistent nausea and vomiting or diarrhea     Notify your health care provider if you experience any of the following:  severe uncontrolled pain     Notify your health care provider if you experience any of the following:  difficulty breathing or increased cough     Notify your health care provider if you experience any of the following:  severe persistent headache     Notify your health care provider if you experience any of the following:  worsening rash     Notify your health care provider if you experience any of the following:  persistent dizziness, light-headedness, or visual disturbances     Notify your health care provider if you experience any of the following:  increased confusion or weakness     Activity as tolerated          Medications:  Reconciled Home Medications:      Medication List        START taking these medications      folic acid 1 MG tablet  Commonly known as: FOLVITE  Take 1 tablet (1 mg total) by mouth once daily.     oxyCODONE 5 MG immediate release tablet  Commonly known as: ROXICODONE  Take 1 tablet (5 mg total) by mouth every 8 (eight) hours as needed (Pain not controlled by acetaminophen).     pantoprazole 40 MG tablet  Commonly known as: PROTONIX  Take 1 tablet (40 mg total) by mouth once daily.     senna 8.6 mg tablet  Commonly known as: SENNA  Take 1 tablet by mouth once daily.     thiamine 100 MG tablet  Take 1 tablet (100 mg total) by  mouth once daily.            CHANGE how you take these medications      ondansetron 4 MG Tbdl  Commonly known as: ZOFRAN-ODT  Take 2 tablets (8 mg total) by mouth every 12 (twelve) hours as needed (Nausea).  What changed:   how much to take  when to take this  reasons to take this            CONTINUE taking these medications      acetaminophen 500 MG tablet  Commonly known as: TYLENOL  Take 2 tablets (1,000 mg total) by mouth every 8 (eight) hours as needed for Pain.     aiymxsvpa-ipiactmg-lkbgwwz ala -25 mg (25 kg or greater)  Commonly known as: BIKTARVY  Take 1 tablet by mouth once daily.            STOP taking these medications      oxyCODONE-acetaminophen  mg per tablet  Commonly known as: PERCOCET     STOOL SOFTENER-LAXATIVE 8.6-50 mg per tablet  Generic drug: senna-docusate              Indwelling Lines/Drains at time of discharge:   Lines/Drains/Airways       None                   Time spent on the discharge of patient: 35 minutes         Allan Jones MD  Department of Hospital Medicine  [facility] - Med Surg ([address])   Yes

## 2025-05-01 NOTE — TELEPHONE ENCOUNTER
----- Message from Med Assistant Bill sent at 5/1/2025  9:40 AM CDT -----  Hello! This pt has a referral I am unable to schedule. Any help with scheduling would be greatly appreciated. Thank you so much!

## 2025-05-01 NOTE — PROGRESS NOTES
Roxy Ledbetter  ED Navigator  Emergency Department    Project: [facility] ED Navigator  Role: Community Health Worker    Date: 05/01/2025  Patient Name: Tasia Cardona  MRN: [id number]  PCP: Pina Jones NP    Assessment:     Tasia Cardona is a 24 y.o. male who has presented to ED for Left upper quadrant abdominal pain . Patient has visited the ED 4 times in the past 3 months. Patient did not contact PCP.     ED Navigator Initial Assessment    ED Navigator Enrollment Documentation  Consent to Services  Does patient consent to completing the assessment?: Yes  Contact  Method of Initial Contact: Phone  Transportation  Insurance Coverage  Do you have coverage/adequate coverage?: Yes  Specialist Appointment  Did the patient come to the ED to see a specialist?: No  Does the patient have a pending specialist referral?: No  Does the patient have a specialist appointment made?: No  PCP Follow Up Appointment  Medications  Is patient able to afford medication?: Yes  Psychological  Food  Communication/Education  Other Financial Concerns  Other Social Barriers/Concerns  Primary Barrier  Plan: The patient was given food bank/Pantry resources for their region.         Social History     Socioeconomic History    Marital status: Single   Tobacco Use    Smoking status: Former     Types: Cigarettes    Smokeless tobacco: Never   Substance and Sexual Activity    Alcohol use: Not Currently    Drug use: Yes     Types: Marijuana     Social Drivers of Health     Financial Resource Strain: Patient Declined (4/29/2025)    Overall Financial Resource Strain (CARDIA)     Difficulty of Paying Living Expenses: Patient declined   Food Insecurity: Patient Declined (4/29/2025)    Hunger Vital Sign     Worried About Running Out of Food in the Last Year: Patient declined     Ran Out of Food in the Last Year: Patient declined   Transportation Needs: Patient Declined (4/29/2025)    PRAPARE - Transportation     Lack of Transportation (Medical): Patient  declined     Lack of Transportation (Non-Medical): Patient declined   Physical Activity: Inactive (3/30/2025)    Exercise Vital Sign     Days of Exercise per Week: 0 days     Minutes of Exercise per Session: 0 min   Stress: Patient Declined (4/29/2025)    [employer] - Occupational Stress Questionnaire     Feeling of Stress : Patient declined   Housing Stability: Patient Declined (4/29/2025)    Housing Stability Vital Sign     Unable to Pay for Housing in the Last Year: Patient declined     Homeless in the Last Year: Patient declined       Plan:   ED navigator outreached patient regarding recent emergency room visit. Assessment completed. Patient states he is feeling good. Patient denies needing assistance with appointment scheduling at this time. Provided patient with resource information for food/utilities/transportation and contact information to [facility] 24/7 nurse triage line.

## 2025-05-04 ENCOUNTER — HOSPITAL ENCOUNTER (EMERGENCY)
Facility: OTHER | Age: 25
Discharge: LEFT BEFORE DISCHARGE | End: 2025-05-05
Attending: EMERGENCY MEDICINE
Payer: MEDICAID

## 2025-05-04 DIAGNOSIS — K85.20 ALCOHOL-INDUCED ACUTE PANCREATITIS, UNSPECIFIED COMPLICATION STATUS: ICD-10-CM

## 2025-05-04 DIAGNOSIS — K92.2 ACUTE UPPER GI BLEEDING: ICD-10-CM

## 2025-05-04 DIAGNOSIS — R11.2 NAUSEA & VOMITING: Primary | ICD-10-CM

## 2025-05-04 LAB
ABSOLUTE EOSINOPHIL (OHS): 0.16 K/UL
ABSOLUTE MONOCYTE (OHS): 0.63 K/UL (ref 0.3–1)
ABSOLUTE NEUTROPHIL COUNT (OHS): 1.92 K/UL (ref 1.8–7.7)
ALBUMIN SERPL BCP-MCNC: 4.1 G/DL (ref 3.5–5.2)
ALP SERPL-CCNC: 104 UNIT/L (ref 40–150)
ALT SERPL W/O P-5'-P-CCNC: 73 UNIT/L (ref 10–44)
ANION GAP (OHS): 13 MMOL/L (ref 8–16)
APTT PPP: 22.5 SECONDS (ref 21–32)
AST SERPL-CCNC: 104 UNIT/L (ref 11–45)
BASOPHILS # BLD AUTO: 0.05 K/UL
BASOPHILS NFR BLD AUTO: 1.1 %
BILIRUB SERPL-MCNC: 0.9 MG/DL (ref 0.1–1)
BUN SERPL-MCNC: 7 MG/DL (ref 6–20)
CALCIUM SERPL-MCNC: 9 MG/DL (ref 8.7–10.5)
CHLORIDE SERPL-SCNC: 102 MMOL/L (ref 95–110)
CO2 SERPL-SCNC: 22 MMOL/L (ref 23–29)
CREAT SERPL-MCNC: 0.8 MG/DL (ref 0.5–1.4)
ERYTHROCYTE [DISTWIDTH] IN BLOOD BY AUTOMATED COUNT: 19.9 % (ref 11.5–14.5)
GFR SERPLBLD CREATININE-BSD FMLA CKD-EPI: >60 ML/MIN/1.73/M2
GLUCOSE SERPL-MCNC: 95 MG/DL (ref 70–110)
HCT VFR BLD AUTO: 39.9 % (ref 40–54)
HGB BLD-MCNC: 12.6 GM/DL (ref 14–18)
IMM GRANULOCYTES # BLD AUTO: 0.01 K/UL (ref 0–0.04)
IMM GRANULOCYTES NFR BLD AUTO: 0.2 % (ref 0–0.5)
INDIRECT COOMBS: NORMAL
INR PPP: 1 (ref 0.8–1.2)
LIPASE SERPL-CCNC: 8 U/L (ref 4–60)
LYMPHOCYTES # BLD AUTO: 1.97 K/UL (ref 1–4.8)
MCH RBC QN AUTO: 25.2 PG (ref 27–31)
MCHC RBC AUTO-ENTMCNC: 31.6 G/DL (ref 32–36)
MCV RBC AUTO: 80 FL (ref 82–98)
NUCLEATED RBC (/100WBC) (OHS): 0 /100 WBC
PLATELET # BLD AUTO: 220 K/UL (ref 150–450)
PLATELET BLD QL SMEAR: NORMAL
PMV BLD AUTO: 9.9 FL (ref 9.2–12.9)
POTASSIUM SERPL-SCNC: 3.7 MMOL/L (ref 3.5–5.1)
PROT SERPL-MCNC: 7.8 GM/DL (ref 6–8.4)
PROTHROMBIN TIME: 11.5 SECONDS (ref 9–12.5)
RBC # BLD AUTO: 5 M/UL (ref 4.6–6.2)
RELATIVE EOSINOPHIL (OHS): 3.4 %
RELATIVE LYMPHOCYTE (OHS): 41.6 % (ref 18–48)
RELATIVE MONOCYTE (OHS): 13.3 % (ref 4–15)
RELATIVE NEUTROPHIL (OHS): 40.4 % (ref 38–73)
RH BLD: NORMAL
SODIUM SERPL-SCNC: 137 MMOL/L (ref 136–145)
SPECIMEN OUTDATE: NORMAL
WBC # BLD AUTO: 4.74 K/UL (ref 3.9–12.7)

## 2025-05-04 PROCEDURE — 80053 COMPREHEN METABOLIC PANEL: CPT | Performed by: EMERGENCY MEDICINE

## 2025-05-04 PROCEDURE — 96375 TX/PRO/DX INJ NEW DRUG ADDON: CPT

## 2025-05-04 PROCEDURE — 96374 THER/PROPH/DIAG INJ IV PUSH: CPT

## 2025-05-04 PROCEDURE — 85025 COMPLETE CBC W/AUTO DIFF WBC: CPT | Performed by: EMERGENCY MEDICINE

## 2025-05-04 PROCEDURE — 93010 ELECTROCARDIOGRAM REPORT: CPT | Mod: ,,, | Performed by: INTERNAL MEDICINE

## 2025-05-04 PROCEDURE — 96361 HYDRATE IV INFUSION ADD-ON: CPT

## 2025-05-04 PROCEDURE — 85610 PROTHROMBIN TIME: CPT | Performed by: EMERGENCY MEDICINE

## 2025-05-04 PROCEDURE — 93005 ELECTROCARDIOGRAM TRACING: CPT

## 2025-05-04 PROCEDURE — 25000003 PHARM REV CODE 250: Performed by: EMERGENCY MEDICINE

## 2025-05-04 PROCEDURE — 86850 RBC ANTIBODY SCREEN: CPT | Performed by: EMERGENCY MEDICINE

## 2025-05-04 PROCEDURE — 83690 ASSAY OF LIPASE: CPT | Performed by: EMERGENCY MEDICINE

## 2025-05-04 PROCEDURE — 96376 TX/PRO/DX INJ SAME DRUG ADON: CPT

## 2025-05-04 PROCEDURE — 63600175 PHARM REV CODE 636 W HCPCS: Performed by: EMERGENCY MEDICINE

## 2025-05-04 PROCEDURE — 99284 EMERGENCY DEPT VISIT MOD MDM: CPT | Mod: 25

## 2025-05-04 PROCEDURE — 85730 THROMBOPLASTIN TIME PARTIAL: CPT | Performed by: EMERGENCY MEDICINE

## 2025-05-04 RX ORDER — PANTOPRAZOLE SODIUM 40 MG/10ML
40 INJECTION, POWDER, LYOPHILIZED, FOR SOLUTION INTRAVENOUS
Status: COMPLETED | OUTPATIENT
Start: 2025-05-04 | End: 2025-05-04

## 2025-05-04 RX ORDER — DIPHENHYDRAMINE HYDROCHLORIDE 50 MG/ML
12.5 INJECTION, SOLUTION INTRAMUSCULAR; INTRAVENOUS
Status: COMPLETED | OUTPATIENT
Start: 2025-05-04 | End: 2025-05-04

## 2025-05-04 RX ORDER — SODIUM CHLORIDE 9 MG/ML
1000 INJECTION, SOLUTION INTRAVENOUS
Status: COMPLETED | OUTPATIENT
Start: 2025-05-04 | End: 2025-05-04

## 2025-05-04 RX ORDER — PROCHLORPERAZINE EDISYLATE 5 MG/ML
10 INJECTION INTRAMUSCULAR; INTRAVENOUS
Status: COMPLETED | OUTPATIENT
Start: 2025-05-04 | End: 2025-05-04

## 2025-05-04 RX ORDER — HYDROMORPHONE HYDROCHLORIDE 1 MG/ML
1 INJECTION, SOLUTION INTRAMUSCULAR; INTRAVENOUS; SUBCUTANEOUS
Refills: 0 | Status: COMPLETED | OUTPATIENT
Start: 2025-05-04 | End: 2025-05-04

## 2025-05-04 RX ADMIN — HYDROMORPHONE HYDROCHLORIDE 1 MG: 1 INJECTION, SOLUTION INTRAMUSCULAR; INTRAVENOUS; SUBCUTANEOUS at 10:05

## 2025-05-04 RX ADMIN — SODIUM CHLORIDE 1000 ML: 9 INJECTION, SOLUTION INTRAVENOUS at 08:05

## 2025-05-04 RX ADMIN — PROCHLORPERAZINE EDISYLATE 10 MG: 5 INJECTION INTRAMUSCULAR; INTRAVENOUS at 09:05

## 2025-05-04 RX ADMIN — DIPHENHYDRAMINE HYDROCHLORIDE 12.5 MG: 50 INJECTION INTRAMUSCULAR; INTRAVENOUS at 08:05

## 2025-05-04 RX ADMIN — HYDROMORPHONE HYDROCHLORIDE 1 MG: 1 INJECTION, SOLUTION INTRAMUSCULAR; INTRAVENOUS; SUBCUTANEOUS at 08:05

## 2025-05-04 RX ADMIN — PANTOPRAZOLE SODIUM 40 MG: 40 INJECTION, POWDER, FOR SOLUTION INTRAVENOUS at 09:05

## 2025-05-04 NOTE — Clinical Note
"Date: 5/4/2025  Patient: Tasia Cardona  Admitted: 5/4/2025  8:02 PM  Attending Provider: Ellie Portillo MD    Tasia Cardona or his authorized caregiver has made the decision for the patient to leave the emergency department against the advice of hi s attending physician. He or his authorized caregiver has been informed and understands the inherent risks, including death, persistent bleeding.  He or his authorized caregiver has decided to accept the responsibility for this decision. Tasia Gupta ey and all necessary parties have been advised that he may return for further evaluation or treatment. His condition at time of discharge was stable.  Tasia Cardnoa had current vital signs as follows:  /65   Pulse 67   Temp 98.8 °F (37.1 °C)    Resp 14   Ht 5' 10" (1.778 m)   Wt 77.1 kg (170 lb)   "

## 2025-05-05 VITALS
HEART RATE: 62 BPM | RESPIRATION RATE: 17 BRPM | OXYGEN SATURATION: 96 % | DIASTOLIC BLOOD PRESSURE: 65 MMHG | BODY MASS INDEX: 24.34 KG/M2 | TEMPERATURE: 99 F | WEIGHT: 170 LBS | SYSTOLIC BLOOD PRESSURE: 113 MMHG | HEIGHT: 70 IN

## 2025-05-05 LAB
OHS QRS DURATION: 90 MS
OHS QTC CALCULATION: 441 MS

## 2025-05-05 NOTE — ED TRIAGE NOTES
Pt, who has a hx of pancreatitis, presents to the ER with complaints of LUQ abdominal pain with N/V that started earlier today; no relief with home Phenergan.

## 2025-05-05 NOTE — ED NOTES
Pt resting quietly on stretcher with eyes closed; opens eyes to verbal stimuli. Pt reporting no relief in pain since receiving medication; currently rating pain as a 9 /10. Dr Portillo made aware; no additional orders received at this time. Bed locked in lowest position; side rails up and locked x 2; call light, bedside table, and personal belongings within reach; monitoring ongoing.

## 2025-05-05 NOTE — ED NOTES
Report given by ROLANDO Unger. Assumed care of this patient. Received patient A&Ox4. Denies any further needs and complaints at this time. ED workup in progress. Safety measures in place; side rails up x2. Call light within pt reach. Plan of care ongoing.

## 2025-05-05 NOTE — ED PROVIDER NOTES
Encounter Date: 5/4/2025       History     Chief Complaint   Patient presents with    Abdominal Pain     LUQ pain onset today w/ n/v, blood and bile in emesis. Pmh pancreatitis. Attempted phenergan earlier today.      26 y/o M with h/o alcohol-induced pancreatitis with chronic pancreatitis, Corinne-Chauhan tear, HIV, and asthma here with c/o N/V today (>10 bloody, bilious episodes) and sharp LUQ pain.  He has been unable to tolerate anything by mouth today.  He denies any retching and describes the bleeding as large amounts (greater than a tsp) each time.  He reports a generalized HA and dizziness.  He thinks his sx are related to a gyro he ate last night but also admits to having a pre-birthday celebration last night in which he drank 3-4 mixed drinks.    He denies bleeding from any other site and is not on any blood thinners.  He states he needs 2 mg of Dilaudid and either Compazine or promethazine for his nausea.      Review of patient's allergies indicates:   Allergen Reactions    Morning Sun Swelling    Droperidol Hives    Pecan nut Swelling    Penicillins Hives     Tolerates cephalosporins    Tolerated piperacillin/tazobactam 11/2023    Sulfa (sulfonamide antibiotics) Hives    Tolonium chloride(toluidine blue-o) Hives, Itching and Rash    Toradol [ketorolac] Rash     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     Past Surgical History:   Procedure Laterality Date    ENDOSCOPIC ULTRASOUND OF UPPER GASTROINTESTINAL TRACT N/A 10/23/2023    Procedure:  ULTRASOUND, UPPER GI TRACT, ENDOSCOPIC;  Surgeon: Emil Villatoro MD;  Location: [facility] ENDO (2ND FLR);  Service: Endoscopy;  Laterality: N/A;    ENDOSCOPIC ULTRASOUND OF UPPER GASTROINTESTINAL TRACT N/A 7/24/2024    Procedure: ULTRASOUND, UPPER GI TRACT, ENDOSCOPIC;  Surgeon: Faustino Trujillo MD;  Location: [facility] ENDO (2ND FLR);  Service: Endoscopy;  Laterality: N/A;    ERCP N/A 10/23/2023    Procedure: ERCP (ENDOSCOPIC RETROGRADE CHOLANGIOPANCREATOGRAPHY);  Surgeon: Emil Villatoro MD;  Location: [facility] ENDO (2ND FLR);  Service: Endoscopy;  Laterality: N/A;    ESOPHAGOGASTRODUODENOSCOPY N/A 9/18/2023    Procedure: EGD (ESOPHAGOGASTRODUODENOSCOPY);  Surgeon: Kg Cleaning MD;  Location: [facility] ENDO (2ND FLR);  Service: Endoscopy;  Laterality: N/A;    ESOPHAGOGASTRODUODENOSCOPY N/A 3/8/2024    Procedure: EGD (ESOPHAGOGASTRODUODENOSCOPY);  Surgeon: Greg Love MD;  Location: [facility] (2ND FLR);  Service: Endoscopy;  Laterality: N/A;    ESOPHAGOGASTRODUODENOSCOPY N/A 7/24/2024    Procedure: EGD (ESOPHAGOGASTRODUODENOSCOPY);  Surgeon: Faustino Trujillo MD;  Location: [facility] (2ND FLR);  Service: Endoscopy;  Laterality: N/A;     Family History   Problem Relation Name Age of Onset    Pancreatitis Mother      Cancer Mother      Ovarian cancer Mother      No Known Problems Father      Cancer Brother      Breast cancer Maternal Grandmother       Social History[1]  Review of Systems    Physical Exam     Initial Vitals [05/04/25 1957]   BP Pulse Resp Temp SpO2   (!) 133/90 87 16 98.8 °F (37.1 °C) 98 %      MAP       --         Physical Exam    Constitutional: He appears well-developed and well-nourished. He is not diaphoretic. No distress.   HENT:   Head: Normocephalic and atraumatic.   Right Ear: External ear normal.   Left Ear: External ear normal.   Nose: Nose normal.   Eyes: Conjunctivae are normal. Right eye exhibits no discharge. Left eye exhibits no discharge. No scleral icterus.   Neck: Neck supple.    Normal range of motion.  Cardiovascular:  Normal rate, regular rhythm and normal heart sounds.     Exam reveals no gallop and no friction rub.       No murmur heard.  Pulmonary/Chest: Breath sounds normal. No respiratory distress. He has no wheezes. He has no rhonchi. He has no rales.   Abdominal: Abdomen is soft. He exhibits no distension.   Mild diffuse tenderness to palpation greatest in the left upper and lower quadrants There is no rebound and no guarding.   Musculoskeletal:         General: Normal range of motion.      Cervical back: Normal range of motion and neck supple.     Neurological: He is alert and oriented to person, place, and time.   Skin: Skin is warm.         ED Course   Procedures  Labs Reviewed   COMPREHENSIVE METABOLIC PANEL - Abnormal       Result Value    Sodium 137      Potassium 3.7      Chloride 102      CO2 22 (*)     Glucose 95      BUN 7      Creatinine 0.8      Calcium 9.0      Protein Total 7.8      Albumin 4.1      Bilirubin Total 0.9             (*)     ALT 73 (*)     Anion Gap 13      eGFR >60     CBC WITH DIFFERENTIAL - Abnormal    WBC 4.74      RBC 5.00      HGB 12.6 (*)     HCT 39.9 (*)     MCV 80 (*)     MCH 25.2 (*)     MCHC 31.6 (*)     RDW 19.9 (*)     Platelet Count 220      MPV 9.9      Nucleated RBC 0      Neut % 40.4      Lymph % 41.6      Mono % 13.3      Eos % 3.4      Basophil % 1.1      Imm Grans % 0.2      Neut # 1.92      Lymph # 1.97      Mono # 0.63      Eos # 0.16      Baso # 0.05      Imm Grans # 0.01     LIPASE - Normal    Lipase Level 8     PROTIME-INR - Normal    PT 11.5      INR 1.0     APTT - Normal    PTT 22.5     PLATELET REVIEW - Normal    Platelet Estimate Appears Normal     CBC W/ AUTO DIFFERENTIAL    Narrative:     The following orders were created for panel order CBC Auto Differential.  Procedure                               Abnormality         Status                     ---------                               -----------          ------                     CBC with Differential[[id number]]       Abnormal            Final result                 Please view results for these tests on the individual orders.   TYPE & SCREEN    Specimen Outdate 05/07/2025 23:59      Group & Rh AB POS      Indirect [place] NEG       EKG Readings: (Independently Interpreted)   Normal sinus rhythm, heart rate 77, normal intervals, no significant ST/T-wave changes, previous EKG from 04/16/2025       Imaging Results    None          Medications   0.9% NaCl infusion (0 mLs Intravenous Stopped 5/4/25 2305)   prochlorperazine injection Soln 10 mg (10 mg Intravenous Given 5/4/25 2104)   HYDROmorphone injection 1 mg (1 mg Intravenous Given 5/4/25 2059)   diphenhydrAMINE injection 12.5 mg (12.5 mg Intravenous Given 5/4/25 2058)   pantoprazole injection 40 mg (40 mg Intravenous Given 5/4/25 2103)   HYDROmorphone injection 1 mg (1 mg Intravenous Given 5/4/25 2219)     Medical Decision Making  25-year-old male presents afebrile, hemodynamically stable and well-appearing with history of alcohol-induced pancreatitis presents complaining of sharp left upper quadrant abdominal pain with multiple episodes of hematemesis today and lightheadedness.  Exam remarkable for diffuse abdominal tenderness greatest in the left upper and lower quadrants.  Differential diagnosis includes acute pancreatitis, erosive gastritis/duodenitis, peptic ulcer disease, Corinne-Chauhan tear, esophagitis, colitis, food-borne illness, viral illness, electrolyte derangement, CHARY, symptomatic anemia, dehydration.  Will obtain lab work and give IV fluids, Protonix, Dilaudid and Compazine.    10:18 PM  On re-evaluation the patient reports no significant change in his pain but denies any further nausea and has been able tolerate a p.o. challenge.      On initial evaluation I did discuss with him the plan to place him in observation given the report of multiple episodes of hematemesis throughout the day and he  verbalized agreement.  However, he is now stating that he has to go take care of his dog and feels as though he just needs 1 more dose of pain medicine and nausea medicine.  I again expressed to him the rationale for wanting to observe him.  Will revisit the patient's decision after he has had additional analgesics.    11:45 PM  On re-evaluation the patient is smiling and states he feels fine.  He is still requesting to be discharged but would like to eat a turkey sandwich and some milk prior to going home.  I have explained to him that he needs to advance his diet slowly and should ensure that he is able tolerate liquids 1st on more than 1 occasion before he progresses onto solid food.  ED return precautions have been again stressed and he verbalized understanding and agreement.  He will be discharged at this time stable condition.    Amount and/or Complexity of Data Reviewed  External Data Reviewed: radiology and notes.     Details: 7/2024 EUS:   Impression:            - Normal esophagus.                          - Scar in the gastric antrum.                          - Erythematous duodenopathy.                          - Pancreatic parenchymal abnormalities consisting                          of diffuse echogenicity, hyperechoic strands and                          lobularity were noted in the pancreatic head and                          genu of the pancreas.                          - There was no evidence of significant pathology                          in the visualized portion of the liver.                          - An anechoic, multicystic and septated lesion                          suggestive of a cyst was identified in the                          pancreatic head, genu of the pancreas and                          pancreatic body. The lesion measured 60.6 mm by                          30.8 mm in maximal cross-sectional diameter. There                          were a few compartments thinly septated. The  outer                          wall of the lesion was thick. There was no                          associated mass. There was internal debris within                          the fluid-filled cavity. One of the cyst have                          hyperechoic material highly suspicious for blood.                          The cyst is complex and may not be amenable to                          endoscopic drainage. Unlikely that the cyst is                          infected, therefore I will not recommend IR                          drainage at this time.                          - A few enlarged lymph nodes were visualized in                          the peripancreatic region and davis hepatis                          region. Tissue has not been obtained. However, the                          endosonographic appearance is highly suspicious                          for benign inflammatory changes.                          - No specimens collected.     Labs: ordered.     Details: CBC without leukocytosis or significant anemia.  Platelets normal.  Coags normal.  Lipase normal.  CMP with mildly elevated LFTs but otherwise within normal limits.    Risk  Prescription drug management.                                      Clinical Impression:  Final diagnoses:  [R11.2] Nausea & vomiting (Primary)  [K92.2] Acute upper GI bleeding  [K85.20] Alcohol-induced acute pancreatitis, unspecified complication status          ED Disposition Condition    AMA                       [1]   Social History  Tobacco Use    Smoking status: Former     Types: Cigarettes    Smokeless tobacco: Never   Substance Use Topics    Alcohol use: Yes     Alcohol/week: 4.0 standard drinks of alcohol     Types: 4 Shots of liquor per week    Drug use: Yes     Types: Marijuana        Ellie Portillo MD  05/04/25 [id number]

## 2025-05-05 NOTE — ED NOTES
AMA was signed by patient and MD Bandar. It was witnessed by this RN and ROLANDO Leone (Charge nurse).

## 2025-05-05 NOTE — ED NOTES
LOC: The patient is awake, alert, and oriented to self, place, time, and situation. Pt is calm and cooperative. Affect is appropriate.  Speech is appropriate and clear.     APPEARANCE: Patient resting on stretcher in no acute distress.  Patient is clean and well groomed.    SKIN: The skin is warm and dry; color consistent with ethnicity.  Patient has normal skin turgor and moist mucus membranes.  Skin intact; no breakdown or bruising noted.     MUSCULOSKELETAL: Patient moving upper and lower extremities without difficulty; denies pain in the extremities or back.  Denies weakness.     RESPIRATORY: Airway is open and patent. Respirations spontaneous, even, easy, and non-labored.  Patient has a normal effort and rate.  No accessory muscle use noted. Denies cough.     CARDIAC:  Normal heart rate noted.  No peripheral edema noted. No complaints of chest pain.     ABDOMEN: Soft though tender to palpation in the LUQ. No distention noted. Pt reports nausea and vomiting; denies diarrhea or constipation.    NEUROLOGIC: Eyes open spontaneously.  Behavior appropriate to situation.  Follows commands; facial expression symmetrical.  Purposeful motor response noted; normal sensation in all extremities. Pt denies headache; denies lightheadedness or dizziness; denies visual disturbances; denies loss of balance; denies unilateral weakness.

## 2025-05-06 ENCOUNTER — HOSPITAL ENCOUNTER (OUTPATIENT)
Facility: OTHER | Age: 25
Discharge: HOME | End: 2025-05-07
Attending: EMERGENCY MEDICINE | Admitting: HOSPITALIST
Payer: MEDICAID

## 2025-05-06 DIAGNOSIS — K86.1 ACUTE ON CHRONIC PANCREATITIS: Primary | ICD-10-CM

## 2025-05-06 DIAGNOSIS — K85.90 ACUTE ON CHRONIC PANCREATITIS: Primary | ICD-10-CM

## 2025-05-06 PROBLEM — D64.9 ANEMIA: Status: ACTIVE | Noted: 2025-05-06

## 2025-05-06 PROBLEM — K86.0 ALCOHOL-INDUCED CHRONIC PANCREATITIS: Status: ACTIVE | Noted: 2023-12-15

## 2025-05-06 LAB
ABSOLUTE EOSINOPHIL (OHS): 0.12 K/UL
ABSOLUTE EOSINOPHIL (OHS): 0.14 K/UL
ABSOLUTE EOSINOPHIL (OHS): 0.15 K/UL
ABSOLUTE EOSINOPHIL (OHS): 0.15 K/UL
ABSOLUTE MONOCYTE (OHS): 0.36 K/UL (ref 0.3–1)
ABSOLUTE MONOCYTE (OHS): 0.48 K/UL (ref 0.3–1)
ABSOLUTE MONOCYTE (OHS): 0.5 K/UL (ref 0.3–1)
ABSOLUTE MONOCYTE (OHS): 0.51 K/UL (ref 0.3–1)
ABSOLUTE NEUTROPHIL COUNT (OHS): 1.02 K/UL (ref 1.8–7.7)
ABSOLUTE NEUTROPHIL COUNT (OHS): 1.15 K/UL (ref 1.8–7.7)
ABSOLUTE NEUTROPHIL COUNT (OHS): 1.41 K/UL (ref 1.8–7.7)
ABSOLUTE NEUTROPHIL COUNT (OHS): 2.4 K/UL (ref 1.8–7.7)
ALBUMIN SERPL BCP-MCNC: 3.4 G/DL (ref 3.5–5.2)
ALBUMIN SERPL BCP-MCNC: 4 G/DL (ref 3.5–5.2)
ALP SERPL-CCNC: 104 UNIT/L (ref 40–150)
ALP SERPL-CCNC: 97 UNIT/L (ref 40–150)
ALT SERPL W/O P-5'-P-CCNC: 71 UNIT/L (ref 10–44)
ALT SERPL W/O P-5'-P-CCNC: 83 UNIT/L (ref 10–44)
ANION GAP (OHS): 12 MMOL/L (ref 8–16)
ANION GAP (OHS): 13 MMOL/L (ref 8–16)
AST SERPL-CCNC: 105 UNIT/L (ref 11–45)
AST SERPL-CCNC: 114 UNIT/L (ref 11–45)
BASOPHILS # BLD AUTO: 0.03 K/UL
BASOPHILS # BLD AUTO: 0.04 K/UL
BASOPHILS # BLD AUTO: 0.05 K/UL
BASOPHILS # BLD AUTO: 0.06 K/UL
BASOPHILS NFR BLD AUTO: 0.8 %
BASOPHILS NFR BLD AUTO: 0.8 %
BASOPHILS NFR BLD AUTO: 1 %
BASOPHILS NFR BLD AUTO: 1.4 %
BILIRUB SERPL-MCNC: 0.3 MG/DL (ref 0.1–1)
BILIRUB SERPL-MCNC: 0.3 MG/DL (ref 0.1–1)
BUN SERPL-MCNC: 6 MG/DL (ref 6–20)
BUN SERPL-MCNC: 7 MG/DL (ref 6–20)
CALCIUM SERPL-MCNC: 7.9 MG/DL (ref 8.7–10.5)
CALCIUM SERPL-MCNC: 8.5 MG/DL (ref 8.7–10.5)
CHLORIDE SERPL-SCNC: 109 MMOL/L (ref 95–110)
CHLORIDE SERPL-SCNC: 114 MMOL/L (ref 95–110)
CO2 SERPL-SCNC: 16 MMOL/L (ref 23–29)
CO2 SERPL-SCNC: 21 MMOL/L (ref 23–29)
CREAT SERPL-MCNC: 0.6 MG/DL (ref 0.5–1.4)
CREAT SERPL-MCNC: 0.7 MG/DL (ref 0.5–1.4)
ERYTHROCYTE [DISTWIDTH] IN BLOOD BY AUTOMATED COUNT: 19.4 % (ref 11.5–14.5)
ERYTHROCYTE [DISTWIDTH] IN BLOOD BY AUTOMATED COUNT: 19.5 % (ref 11.5–14.5)
ERYTHROCYTE [DISTWIDTH] IN BLOOD BY AUTOMATED COUNT: 19.7 % (ref 11.5–14.5)
ERYTHROCYTE [DISTWIDTH] IN BLOOD BY AUTOMATED COUNT: 20 % (ref 11.5–14.5)
ETHANOL SERPL-MCNC: 183 MG/DL
GFR SERPLBLD CREATININE-BSD FMLA CKD-EPI: >60 ML/MIN/1.73/M2
GFR SERPLBLD CREATININE-BSD FMLA CKD-EPI: >60 ML/MIN/1.73/M2
GLUCOSE SERPL-MCNC: 86 MG/DL (ref 70–110)
GLUCOSE SERPL-MCNC: 91 MG/DL (ref 70–110)
HCT VFR BLD AUTO: 32.9 % (ref 40–54)
HCT VFR BLD AUTO: 33.5 % (ref 40–54)
HCT VFR BLD AUTO: 35 % (ref 40–54)
HCT VFR BLD AUTO: 39.8 % (ref 40–54)
HGB BLD-MCNC: 10.2 GM/DL (ref 14–18)
HGB BLD-MCNC: 10.3 GM/DL (ref 14–18)
HGB BLD-MCNC: 10.7 GM/DL (ref 14–18)
HGB BLD-MCNC: 12.2 GM/DL (ref 14–18)
HOLD SPECIMEN: NORMAL
IMM GRANULOCYTES # BLD AUTO: 0 K/UL (ref 0–0.04)
IMM GRANULOCYTES # BLD AUTO: 0 K/UL (ref 0–0.04)
IMM GRANULOCYTES # BLD AUTO: 0.01 K/UL (ref 0–0.04)
IMM GRANULOCYTES # BLD AUTO: 0.05 K/UL (ref 0–0.04)
IMM GRANULOCYTES NFR BLD AUTO: 0 % (ref 0–0.5)
IMM GRANULOCYTES NFR BLD AUTO: 0 % (ref 0–0.5)
IMM GRANULOCYTES NFR BLD AUTO: 0.2 % (ref 0–0.5)
IMM GRANULOCYTES NFR BLD AUTO: 1.2 % (ref 0–0.5)
INDIRECT COOMBS: NORMAL
INR PPP: 1.1 (ref 0.8–1.2)
LIPASE SERPL-CCNC: 14 U/L (ref 4–60)
LYMPHOCYTES # BLD AUTO: 1.85 K/UL (ref 1–4.8)
LYMPHOCYTES # BLD AUTO: 1.88 K/UL (ref 1–4.8)
LYMPHOCYTES # BLD AUTO: 2.53 K/UL (ref 1–4.8)
LYMPHOCYTES # BLD AUTO: 2.69 K/UL (ref 1–4.8)
MAGNESIUM SERPL-MCNC: 2.2 MG/DL (ref 1.6–2.6)
MAGNESIUM SERPL-MCNC: 2.5 MG/DL (ref 1.6–2.6)
MCH RBC QN AUTO: 24.7 PG (ref 27–31)
MCH RBC QN AUTO: 24.9 PG (ref 27–31)
MCH RBC QN AUTO: 24.9 PG (ref 27–31)
MCH RBC QN AUTO: 25.2 PG (ref 27–31)
MCHC RBC AUTO-ENTMCNC: 30.6 G/DL (ref 32–36)
MCHC RBC AUTO-ENTMCNC: 30.7 G/DL (ref 32–36)
MCHC RBC AUTO-ENTMCNC: 30.7 G/DL (ref 32–36)
MCHC RBC AUTO-ENTMCNC: 31 G/DL (ref 32–36)
MCV RBC AUTO: 81 FL (ref 82–98)
MCV RBC AUTO: 82 FL (ref 82–98)
NUCLEATED RBC (/100WBC) (OHS): 0 /100 WBC
PLATELET # BLD AUTO: 168 K/UL (ref 150–450)
PLATELET # BLD AUTO: 171 K/UL (ref 150–450)
PLATELET # BLD AUTO: 185 K/UL (ref 150–450)
PLATELET # BLD AUTO: 220 K/UL (ref 150–450)
PMV BLD AUTO: 10.2 FL (ref 9.2–12.9)
PMV BLD AUTO: 10.2 FL (ref 9.2–12.9)
PMV BLD AUTO: 9.5 FL (ref 9.2–12.9)
PMV BLD AUTO: 9.9 FL (ref 9.2–12.9)
POTASSIUM SERPL-SCNC: 3.5 MMOL/L (ref 3.5–5.1)
POTASSIUM SERPL-SCNC: 3.8 MMOL/L (ref 3.5–5.1)
PROT SERPL-MCNC: 6.9 GM/DL (ref 6–8.4)
PROT SERPL-MCNC: 8 GM/DL (ref 6–8.4)
PROTHROMBIN TIME: 12 SECONDS (ref 9–12.5)
RBC # BLD AUTO: 4.05 M/UL (ref 4.6–6.2)
RBC # BLD AUTO: 4.14 M/UL (ref 4.6–6.2)
RBC # BLD AUTO: 4.29 M/UL (ref 4.6–6.2)
RBC # BLD AUTO: 4.94 M/UL (ref 4.6–6.2)
RELATIVE EOSINOPHIL (OHS): 2.8 %
RELATIVE EOSINOPHIL (OHS): 3.1 %
RELATIVE EOSINOPHIL (OHS): 3.4 %
RELATIVE EOSINOPHIL (OHS): 3.5 %
RELATIVE LYMPHOCYTE (OHS): 37.3 % (ref 18–48)
RELATIVE LYMPHOCYTE (OHS): 53.1 % (ref 18–48)
RELATIVE LYMPHOCYTE (OHS): 56.4 % (ref 18–48)
RELATIVE LYMPHOCYTE (OHS): 58.7 % (ref 18–48)
RELATIVE MONOCYTE (OHS): 10.1 % (ref 4–15)
RELATIVE MONOCYTE (OHS): 10.2 % (ref 4–15)
RELATIVE MONOCYTE (OHS): 10.3 % (ref 4–15)
RELATIVE MONOCYTE (OHS): 11.6 % (ref 4–15)
RELATIVE NEUTROPHIL (OHS): 23.6 % (ref 38–73)
RELATIVE NEUTROPHIL (OHS): 29.6 % (ref 38–73)
RELATIVE NEUTROPHIL (OHS): 32.5 % (ref 38–73)
RELATIVE NEUTROPHIL (OHS): 48.4 % (ref 38–73)
RH BLD: NORMAL
SODIUM SERPL-SCNC: 142 MMOL/L (ref 136–145)
SODIUM SERPL-SCNC: 143 MMOL/L (ref 136–145)
SPECIMEN OUTDATE: NORMAL
WBC # BLD AUTO: 3.54 K/UL (ref 3.9–12.7)
WBC # BLD AUTO: 4.31 K/UL (ref 3.9–12.7)
WBC # BLD AUTO: 4.77 K/UL (ref 3.9–12.7)
WBC # BLD AUTO: 4.96 K/UL (ref 3.9–12.7)

## 2025-05-06 PROCEDURE — 25500020 PHARM REV CODE 255: Performed by: HOSPITALIST

## 2025-05-06 PROCEDURE — 96361 HYDRATE IV INFUSION ADD-ON: CPT

## 2025-05-06 PROCEDURE — A4216 STERILE WATER/SALINE, 10 ML: HCPCS | Performed by: PHYSICIAN ASSISTANT

## 2025-05-06 PROCEDURE — 85610 PROTHROMBIN TIME: CPT | Performed by: PHYSICIAN ASSISTANT

## 2025-05-06 PROCEDURE — 11000001 HC ACUTE MED/SURG PRIVATE ROOM

## 2025-05-06 PROCEDURE — 25000003 PHARM REV CODE 250: Performed by: PHYSICIAN ASSISTANT

## 2025-05-06 PROCEDURE — 96365 THER/PROPH/DIAG IV INF INIT: CPT

## 2025-05-06 PROCEDURE — G0378 HOSPITAL OBSERVATION PER HR: HCPCS

## 2025-05-06 PROCEDURE — 36415 COLL VENOUS BLD VENIPUNCTURE: CPT | Performed by: INTERNAL MEDICINE

## 2025-05-06 PROCEDURE — 85025 COMPLETE CBC W/AUTO DIFF WBC: CPT | Performed by: PHYSICIAN ASSISTANT

## 2025-05-06 PROCEDURE — 99285 EMERGENCY DEPT VISIT HI MDM: CPT | Mod: 25

## 2025-05-06 PROCEDURE — 80053 COMPREHEN METABOLIC PANEL: CPT | Mod: 59 | Performed by: EMERGENCY MEDICINE

## 2025-05-06 PROCEDURE — 82077 ASSAY SPEC XCP UR&BREATH IA: CPT | Performed by: PHYSICIAN ASSISTANT

## 2025-05-06 PROCEDURE — 63600175 PHARM REV CODE 636 W HCPCS: Performed by: PHYSICIAN ASSISTANT

## 2025-05-06 PROCEDURE — 80053 COMPREHEN METABOLIC PANEL: CPT | Mod: 91 | Performed by: PHYSICIAN ASSISTANT

## 2025-05-06 PROCEDURE — 36415 COLL VENOUS BLD VENIPUNCTURE: CPT | Performed by: PHYSICIAN ASSISTANT

## 2025-05-06 PROCEDURE — 83735 ASSAY OF MAGNESIUM: CPT | Mod: 91 | Performed by: PHYSICIAN ASSISTANT

## 2025-05-06 PROCEDURE — 96375 TX/PRO/DX INJ NEW DRUG ADDON: CPT

## 2025-05-06 PROCEDURE — 25000003 PHARM REV CODE 250: Performed by: EMERGENCY MEDICINE

## 2025-05-06 PROCEDURE — 83735 ASSAY OF MAGNESIUM: CPT | Mod: 59 | Performed by: EMERGENCY MEDICINE

## 2025-05-06 PROCEDURE — 86900 BLOOD TYPING SEROLOGIC ABO: CPT | Performed by: PHYSICIAN ASSISTANT

## 2025-05-06 PROCEDURE — 63600175 PHARM REV CODE 636 W HCPCS: Performed by: HOSPITALIST

## 2025-05-06 PROCEDURE — 85025 COMPLETE CBC W/AUTO DIFF WBC: CPT | Performed by: EMERGENCY MEDICINE

## 2025-05-06 PROCEDURE — 96376 TX/PRO/DX INJ SAME DRUG ADON: CPT

## 2025-05-06 PROCEDURE — 96368 THER/DIAG CONCURRENT INF: CPT

## 2025-05-06 PROCEDURE — 96367 TX/PROPH/DG ADDL SEQ IV INF: CPT

## 2025-05-06 PROCEDURE — 83690 ASSAY OF LIPASE: CPT | Performed by: EMERGENCY MEDICINE

## 2025-05-06 PROCEDURE — 63600175 PHARM REV CODE 636 W HCPCS: Performed by: EMERGENCY MEDICINE

## 2025-05-06 PROCEDURE — 96366 THER/PROPH/DIAG IV INF ADDON: CPT

## 2025-05-06 RX ORDER — GLUCAGON 1 MG
1 KIT INJECTION
Status: DISCONTINUED | OUTPATIENT
Start: 2025-05-06 | End: 2025-05-07 | Stop reason: HOSPADM

## 2025-05-06 RX ORDER — PROCHLORPERAZINE EDISYLATE 5 MG/ML
10 INJECTION INTRAMUSCULAR; INTRAVENOUS
Status: COMPLETED | OUTPATIENT
Start: 2025-05-06 | End: 2025-05-06

## 2025-05-06 RX ORDER — PANTOPRAZOLE SODIUM 40 MG/10ML
40 INJECTION, POWDER, LYOPHILIZED, FOR SOLUTION INTRAVENOUS 2 TIMES DAILY
Status: DISCONTINUED | OUTPATIENT
Start: 2025-05-06 | End: 2025-05-07 | Stop reason: HOSPADM

## 2025-05-06 RX ORDER — AMOXICILLIN 250 MG
1 CAPSULE ORAL 2 TIMES DAILY PRN
Status: DISCONTINUED | OUTPATIENT
Start: 2025-05-06 | End: 2025-05-07 | Stop reason: HOSPADM

## 2025-05-06 RX ORDER — TALC
6 POWDER (GRAM) TOPICAL NIGHTLY PRN
Status: DISCONTINUED | OUTPATIENT
Start: 2025-05-06 | End: 2025-05-07 | Stop reason: HOSPADM

## 2025-05-06 RX ORDER — SODIUM CHLORIDE 9 MG/ML
1000 INJECTION, SOLUTION INTRAVENOUS
Status: COMPLETED | OUTPATIENT
Start: 2025-05-06 | End: 2025-05-06

## 2025-05-06 RX ORDER — DICYCLOMINE HYDROCHLORIDE 10 MG/ML
20 INJECTION INTRAMUSCULAR 4 TIMES DAILY PRN
Status: DISCONTINUED | OUTPATIENT
Start: 2025-05-06 | End: 2025-05-07 | Stop reason: HOSPADM

## 2025-05-06 RX ORDER — DIPHENHYDRAMINE HYDROCHLORIDE 50 MG/ML
6.25 INJECTION, SOLUTION INTRAMUSCULAR; INTRAVENOUS ONCE
Status: COMPLETED | OUTPATIENT
Start: 2025-05-06 | End: 2025-05-06

## 2025-05-06 RX ORDER — SODIUM CHLORIDE, SODIUM LACTATE, POTASSIUM CHLORIDE, CALCIUM CHLORIDE 600; 310; 30; 20 MG/100ML; MG/100ML; MG/100ML; MG/100ML
INJECTION, SOLUTION INTRAVENOUS CONTINUOUS
Status: DISCONTINUED | OUTPATIENT
Start: 2025-05-06 | End: 2025-05-07 | Stop reason: HOSPADM

## 2025-05-06 RX ORDER — ACETAMINOPHEN 325 MG/1
650 TABLET ORAL EVERY 6 HOURS PRN
Status: DISCONTINUED | OUTPATIENT
Start: 2025-05-06 | End: 2025-05-07 | Stop reason: HOSPADM

## 2025-05-06 RX ORDER — IBUPROFEN 200 MG
16 TABLET ORAL
Status: DISCONTINUED | OUTPATIENT
Start: 2025-05-06 | End: 2025-05-07 | Stop reason: HOSPADM

## 2025-05-06 RX ORDER — OXYCODONE AND ACETAMINOPHEN 5; 325 MG/1; MG/1
1 TABLET ORAL EVERY 4 HOURS PRN
Refills: 0 | Status: DISCONTINUED | OUTPATIENT
Start: 2025-05-06 | End: 2025-05-07 | Stop reason: HOSPADM

## 2025-05-06 RX ORDER — SODIUM CHLORIDE 0.9 % (FLUSH) 0.9 %
10 SYRINGE (ML) INJECTION EVERY 8 HOURS
Status: DISCONTINUED | OUTPATIENT
Start: 2025-05-06 | End: 2025-05-07 | Stop reason: HOSPADM

## 2025-05-06 RX ORDER — PROCHLORPERAZINE EDISYLATE 5 MG/ML
10 INJECTION INTRAMUSCULAR; INTRAVENOUS EVERY 6 HOURS PRN
Status: DISCONTINUED | OUTPATIENT
Start: 2025-05-06 | End: 2025-05-07 | Stop reason: HOSPADM

## 2025-05-06 RX ORDER — DIAZEPAM 5 MG/1
5 TABLET ORAL EVERY 4 HOURS PRN
Status: DISCONTINUED | OUTPATIENT
Start: 2025-05-06 | End: 2025-05-07 | Stop reason: HOSPADM

## 2025-05-06 RX ORDER — NALOXONE HCL 0.4 MG/ML
0.02 VIAL (ML) INJECTION
Status: DISCONTINUED | OUTPATIENT
Start: 2025-05-06 | End: 2025-05-07 | Stop reason: HOSPADM

## 2025-05-06 RX ORDER — HYDROMORPHONE HYDROCHLORIDE 1 MG/ML
1 INJECTION, SOLUTION INTRAMUSCULAR; INTRAVENOUS; SUBCUTANEOUS
Refills: 0 | Status: COMPLETED | OUTPATIENT
Start: 2025-05-06 | End: 2025-05-06

## 2025-05-06 RX ORDER — IBUPROFEN 200 MG
24 TABLET ORAL
Status: DISCONTINUED | OUTPATIENT
Start: 2025-05-06 | End: 2025-05-07 | Stop reason: HOSPADM

## 2025-05-06 RX ORDER — MORPHINE SULFATE 2 MG/ML
2 INJECTION, SOLUTION INTRAMUSCULAR; INTRAVENOUS EVERY 4 HOURS PRN
Status: DISCONTINUED | OUTPATIENT
Start: 2025-05-06 | End: 2025-05-07

## 2025-05-06 RX ORDER — DIPHENHYDRAMINE HYDROCHLORIDE 50 MG/ML
25 INJECTION, SOLUTION INTRAMUSCULAR; INTRAVENOUS EVERY 6 HOURS PRN
Status: DISCONTINUED | OUTPATIENT
Start: 2025-05-06 | End: 2025-05-07 | Stop reason: HOSPADM

## 2025-05-06 RX ORDER — MORPHINE SULFATE 2 MG/ML
2 INJECTION, SOLUTION INTRAMUSCULAR; INTRAVENOUS ONCE
Status: COMPLETED | OUTPATIENT
Start: 2025-05-06 | End: 2025-05-06

## 2025-05-06 RX ORDER — DIPHENHYDRAMINE HYDROCHLORIDE 50 MG/ML
12.5 INJECTION, SOLUTION INTRAMUSCULAR; INTRAVENOUS
Status: COMPLETED | OUTPATIENT
Start: 2025-05-06 | End: 2025-05-06

## 2025-05-06 RX ORDER — PANTOPRAZOLE SODIUM 40 MG/10ML
40 INJECTION, POWDER, LYOPHILIZED, FOR SOLUTION INTRAVENOUS
Status: COMPLETED | OUTPATIENT
Start: 2025-05-06 | End: 2025-05-06

## 2025-05-06 RX ORDER — FOLIC ACID 1 MG/1
1 TABLET ORAL DAILY
Status: DISCONTINUED | OUTPATIENT
Start: 2025-05-06 | End: 2025-05-07 | Stop reason: HOSPADM

## 2025-05-06 RX ORDER — CIPROFLOXACIN 2 MG/ML
400 INJECTION, SOLUTION INTRAVENOUS
Status: DISCONTINUED | OUTPATIENT
Start: 2025-05-06 | End: 2025-05-06

## 2025-05-06 RX ORDER — LANOLIN ALCOHOL/MO/W.PET/CERES
100 CREAM (GRAM) TOPICAL DAILY
Status: DISCONTINUED | OUTPATIENT
Start: 2025-05-06 | End: 2025-05-07 | Stop reason: HOSPADM

## 2025-05-06 RX ADMIN — Medication 10 ML: at 05:05

## 2025-05-06 RX ADMIN — FOLIC ACID 1 MG: 1 TABLET ORAL at 08:05

## 2025-05-06 RX ADMIN — SODIUM CHLORIDE, POTASSIUM CHLORIDE, SODIUM LACTATE AND CALCIUM CHLORIDE: 600; 310; 30; 20 INJECTION, SOLUTION INTRAVENOUS at 05:05

## 2025-05-06 RX ADMIN — PROCHLORPERAZINE EDISYLATE 10 MG: 5 INJECTION INTRAMUSCULAR; INTRAVENOUS at 02:05

## 2025-05-06 RX ADMIN — PANTOPRAZOLE SODIUM 40 MG: 40 INJECTION, POWDER, FOR SOLUTION INTRAVENOUS at 04:05

## 2025-05-06 RX ADMIN — OXYCODONE HYDROCHLORIDE AND ACETAMINOPHEN 1 TABLET: 5; 325 TABLET ORAL at 09:05

## 2025-05-06 RX ADMIN — MORPHINE SULFATE 2 MG: 2 INJECTION, SOLUTION INTRAMUSCULAR; INTRAVENOUS at 05:05

## 2025-05-06 RX ADMIN — BICTEGRAVIR SODIUM, EMTRICITABINE, AND TENOFOVIR ALAFENAMIDE FUMARATE 1 TABLET: 50; 200; 25 TABLET ORAL at 08:05

## 2025-05-06 RX ADMIN — PANTOPRAZOLE SODIUM 40 MG: 40 INJECTION, POWDER, FOR SOLUTION INTRAVENOUS at 02:05

## 2025-05-06 RX ADMIN — DIPHENHYDRAMINE HYDROCHLORIDE 6.25 MG: 50 INJECTION, SOLUTION INTRAMUSCULAR; INTRAVENOUS at 05:05

## 2025-05-06 RX ADMIN — Medication 100 MG: at 08:05

## 2025-05-06 RX ADMIN — OXYCODONE HYDROCHLORIDE AND ACETAMINOPHEN 1 TABLET: 5; 325 TABLET ORAL at 04:05

## 2025-05-06 RX ADMIN — Medication 10 ML: at 04:05

## 2025-05-06 RX ADMIN — IOHEXOL 75 ML: 350 INJECTION, SOLUTION INTRAVENOUS at 08:05

## 2025-05-06 RX ADMIN — THIAMINE HYDROCHLORIDE: 100 INJECTION, SOLUTION INTRAMUSCULAR; INTRAVENOUS at 04:05

## 2025-05-06 RX ADMIN — PANTOPRAZOLE SODIUM 40 MG: 40 INJECTION, POWDER, FOR SOLUTION INTRAVENOUS at 08:05

## 2025-05-06 RX ADMIN — DIPHENHYDRAMINE HYDROCHLORIDE 12.5 MG: 50 INJECTION INTRAMUSCULAR; INTRAVENOUS at 02:05

## 2025-05-06 RX ADMIN — MULTIVITAMIN TABLET 1 TABLET: TABLET at 08:05

## 2025-05-06 RX ADMIN — CIPROFLOXACIN 400 MG: 2 INJECTION, SOLUTION INTRAVENOUS at 04:05

## 2025-05-06 RX ADMIN — HYDROMORPHONE HYDROCHLORIDE 1 MG: 1 INJECTION, SOLUTION INTRAMUSCULAR; INTRAVENOUS; SUBCUTANEOUS at 02:05

## 2025-05-06 RX ADMIN — MORPHINE SULFATE 2 MG: 2 INJECTION, SOLUTION INTRAMUSCULAR; INTRAVENOUS at 04:05

## 2025-05-06 RX ADMIN — SODIUM CHLORIDE 1000 ML: 9 INJECTION, SOLUTION INTRAVENOUS at 02:05

## 2025-05-06 NOTE — PROGRESS NOTES
[facility] Emergency Dept (Observation)  [facility] Medicine  Progress Note    Patient Name: Tasia Cardona  MRN: [id number]  Patient Class: OP- Observation   Admission Date: 5/6/2025  Length of Stay: 0 days  Attending Physician: No att. providers found  Primary Care Provider: Pina Jones NP        Subjective     Principal Problem:Hematemesis        HPI:  Mr. Tasia Cardona is a 25 y.o. male, with PMH of chronic alcohol-induced pancreatitis, prior necrotizing pancreatitis, alcohol use disorder, Corinne-hitchcock tear, splenic vein thrombosis, alcohol induced gastritis, HIV, asthma, anemia, polycythemia vera, who presented to [facility] ED on  5/5/25 due to LUQ abdominal pain. He reports that he has been vomiting bright red blood beginning 5/4/25 and worsening throughout the day on 5/5/25. He states he can not tolerate PO intake. He denied BRBPR, melena, hematochezia. He notes associated light headedness. During his ED visit on 5/4/25 he was advised to remain for workup of upper GI bleeding, but declined admission due to need to arrange for pet care. In the ED today, he was evaluated with a CBC that showed no leukocytosis or left shift.  H&H were 12.2/39.8.  A metabolic panel was without acute findings, but did show , ALT 83.  Lipase was not elevated at 14.  He was treated in the ED with Compazine, Benadryl, Dilaudid. He was placed on observation.     Overview/Hospital Course:  No notes on file    Interval History: No acute events overnight.  Appears comfortable, asking for food and with strange affect also asking for iv dilaudid.  Reports it was his birthday 2 days ago and he drank alcohol.  All questions answered and patient had no further complaints.    Objective:     Vital Signs (Most Recent):  Temp: 98.2 °F (36.8 °C) (05/06/25 1118)  Pulse: 79 (05/06/25 1100)  Resp: 17 (05/06/25 0900)  BP: (!) 97/55 (05/06/25 1100)  SpO2: 98 % (05/06/25 1100) Vital Signs (24h Range):  Temp:  [98 °F (36.7 °C)-98.2 °F (36.8 °C)]  98.2 °F (36.8 °C)  Pulse:  [] 79  Resp:  [13-20] 17  SpO2:  [96 %-100 %] 98 %  BP: ()/(47-84) 97/55     Weight: 77.1 kg (170 lb)  Body mass index is 24.39 kg/m².    Intake/Output Summary (Last 24 hours) at 5/6/2025 1143  Last data filed at 5/6/2025 1012  Gross per 24 hour   Intake 2200 ml   Output --   Net 2200 ml         Physical Exam  Vitals and nursing note reviewed.   Constitutional:       General: He is not in acute distress.     Appearance: Normal appearance. He is well-developed and normal weight. He is not ill-appearing, toxic-appearing or diaphoretic.   HENT:      Head: Normocephalic and atraumatic.      Right Ear: External ear normal.      Left Ear: External ear normal.      Mouth/Throat:      Mouth: Mucous membranes are moist.   Eyes:      General: No scleral icterus.        Right eye: No discharge.         Left eye: No discharge.      Conjunctiva/sclera: Conjunctivae normal.   Neck:      Vascular: No JVD.      Trachea: No tracheal deviation.   Cardiovascular:      Rate and Rhythm: Normal rate and regular rhythm.      Heart sounds: Normal heart sounds. No murmur heard.     No gallop.   Pulmonary:      Effort: Pulmonary effort is normal. No respiratory distress.      Breath sounds: Normal breath sounds. No stridor. No wheezing or rales.   Abdominal:      General: Bowel sounds are normal. There is no distension.      Palpations: Abdomen is soft. There is no mass.      Tenderness: There is abdominal tenderness (diffuse). There is no guarding.   Musculoskeletal:         General: No deformity. Normal range of motion.      Cervical back: Normal range of motion and neck supple.   Skin:     General: Skin is warm and dry.   Neurological:      General: No focal deficit present.      Mental Status: He is alert and oriented to person, place, and time. Mental status is at baseline.      Cranial Nerves: No cranial nerve deficit.      Motor: No abnormal muscle tone.      Coordination: Coordination normal.    Psychiatric:      Comments: Bright mood, laughs easily, makes strange jokes, somewhat strange affect.               Significant Labs: All pertinent labs within the past 24 hours have been reviewed.    Significant Imaging: I have reviewed all pertinent imaging results/findings within the past 24 hours.      Assessment & Plan  Hematemesis  Alcohol-induced chronic pancreatitis  Pancreatic pseudocyst/cyst  -Placed in observation status  -Presented with nausea, vomiting and abdominal pain after drinking alcohol for his birthday.  Reports only small amount of bright red blood in his emesis  -Noted history of chronic pancreatitis.  Patient reports it was secondary to splenic vein thrombosis.  -Lipase is normal  -CT Abdomen / Pelvis shows acute pancreatitis with similar peripancreatic fat stranding and small peripancreatic fluid collection, no worsening inflammatory changes, chronic splenic vein thrombosis with collateral vessels in the upper abdomen, as seen previously.  Hepatosplenomegaly and hepatic steatosis.  -Hb 12.2 on admit and now after IV fluids 10.2  -GI consulted and input appreciated  -Allow clear liquids and continue to trend H/H  -Monitor CIWA - no signs of withdrawal thus far  -Continue BID PPI, Folic acid, thiamine and MVI  -Continue antiemetics and oral / IV prn analgesics.  ?Drug seeking behavior?.    HIV infection  -CD4 10/12/23 was 1080  -Continue Biktarvy  Mild intermittent asthma without complication  -No symptoms of exacerbation at present  -Monitor and provide p.r.n. DuoNebs if needed  Anemia  -Hb 12.6 on admit and after IV fluids is now 10.2  -No evidence of active bleeding thus far  -Continue to minotr and treat as above.  VTE Risk Mitigation (From admission, onward)           Ordered     IP VTE HIGH RISK PATIENT  Once         05/06/25 0334     Place sequential compression device  Until discontinued         05/06/25 0334                    Discharge Planning   CASTILLO:      Code Status: Full Code    Medical Readiness for Discharge Date:                            Abbe Varghese MD  Department of Hospital Medicine   [facility] - Emergency Dept (Observation)

## 2025-05-06 NOTE — ED PROVIDER NOTES
Encounter Date: 5/5/2025       History     Chief Complaint   Patient presents with    Abdominal Pain     LUQ ABD pain, diarrhea and bright right hematemesis onset yesterday worsening this morning. Denies blood in stool. Unable to tolerate water. Hx pancreatitis.      25-year-old male with history of alcohol-induced pancreatitis presents with complaint of persistent sharp left upper abdominal pain as well as nausea and vomiting and hematemesis since being discharged yesterday from the emergency department.  During his visit yesterday he reported vomiting more than a spoonful of blood several times.  He states since leaving the emergency department yesterday morning he has continued to vomit blood.  He reports some lightheadedness but denies any other associated symptoms.  He reports trying promethazine an ice chips with no relief.    During his ED visit yesterday the patient was never observed vomiting and he was able tolerate a p.o. challenge and requested a meal after receiving a single dose of Compazine.  Given the report of multiple episodes of hematemesis, he was offered observation but he declined stating he needed to hair for his dog.  Today he states that he has made arrangements for his dog and is here prepared to be admitted.      Review of patient's allergies indicates:   Allergen Reactions    [place] Swelling    Droperidol Hives    Pecan nut Swelling    Penicillins Hives     Tolerates cephalosporins    Tolerated piperacillin/tazobactam 11/2023    Sulfa (sulfonamide antibiotics) Hives    Tolonium chloride(toluidine blue-o) Hives, Itching and Rash    Toradol [ketorolac] Rash     Past Medical History:   Diagnosis Date    Acute necrotizing pancreatitis 09/20/2023    Alcohol use disorder 10/05/2023    Asthma     Chronic pancreatitis 09/20/2023    Hepatitis C antibody positive in blood 09/18/2023 9/2023 PCR negative    HIV infection 10/2021    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/2023    Normocytic anemia 09/18/2023     Pancreatic pseudocyst/cyst 10/24/2023    Polycythemia vera 11/28/2021    Receives phlebotomy if Hct>45    Positive CMV IgG serology 09/21/2023    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     Past Surgical History:   Procedure Laterality Date    ENDOSCOPIC ULTRASOUND OF UPPER GASTROINTESTINAL TRACT N/A 10/23/2023    Procedure: ULTRASOUND, UPPER GI TRACT, ENDOSCOPIC;  Surgeon: Emil Villatoro MD;  Location: [facility] ENDO (2ND FLR);  Service: Endoscopy;  Laterality: N/A;    ENDOSCOPIC ULTRASOUND OF UPPER GASTROINTESTINAL TRACT N/A 7/24/2024    Procedure: ULTRASOUND, UPPER GI TRACT, ENDOSCOPIC;  Surgeon: Faustino Trujillo MD;  Location: [facility] ENDO (2ND FLR);  Service: Endoscopy;  Laterality: N/A;    ERCP N/A 10/23/2023    Procedure: ERCP (ENDOSCOPIC RETROGRADE CHOLANGIOPANCREATOGRAPHY);  Surgeon: Emil Villatoro MD;  Location: [facility] ENDO (2ND FLR);  Service: Endoscopy;  Laterality: N/A;    ESOPHAGOGASTRODUODENOSCOPY N/A 9/18/2023    Procedure: EGD (ESOPHAGOGASTRODUODENOSCOPY);  Surgeon: Kg Cleaning MD;  Location: [facility] ENDO (2ND FLR);  Service: Endoscopy;  Laterality: N/A;    ESOPHAGOGASTRODUODENOSCOPY N/A 3/8/2024    Procedure: EGD (ESOPHAGOGASTRODUODENOSCOPY);  Surgeon: Greg Love MD;  Location: [facility] ENDO (2ND FLR);  Service: Endoscopy;  Laterality: N/A;    ESOPHAGOGASTRODUODENOSCOPY N/A 7/24/2024    Procedure: EGD (ESOPHAGOGASTRODUODENOSCOPY);  Surgeon: Faustino Trujillo MD;  Location: [facility] ENDO (2ND FLR);  Service: Endoscopy;  Laterality: N/A;     Family History   Problem Relation Name Age of Onset    Pancreatitis Mother      Cancer Mother      Ovarian cancer Mother      No Known Problems Father      Cancer Brother      Breast cancer Maternal Grandmother       Social History[1]  Review of Systems    Physical Exam     Initial Vitals [05/05/25 2131]   BP Pulse Resp Temp SpO2   101/70 104 20 98 °F (36.7 °C) 98 %      MAP       --         Physical Exam    Vitals reviewed.  Constitutional: He appears well-developed  and well-nourished. He is not diaphoretic. No distress.   Smiling, well-appearing   HENT:   Head: Normocephalic and atraumatic.   Right Ear: External ear normal.   Left Ear: External ear normal.   Nose: Nose normal.   Eyes: Conjunctivae and EOM are normal. Pupils are equal, round, and reactive to light. Left eye exhibits no discharge.   Neck: Neck supple.   Normal range of motion.  Cardiovascular:  Normal rate, regular rhythm and normal heart sounds.     Exam reveals no gallop and no friction rub.       No murmur heard.  Pulmonary/Chest: Breath sounds normal. No respiratory distress. He has no wheezes. He has no rhonchi. He has no rales.   Abdominal: Abdomen is soft. He exhibits no distension.   Diffuse abdominal tenderness greatest in the left upper quadrant There is no rebound and no guarding.   Musculoskeletal:         General: Normal range of motion.      Cervical back: Normal range of motion and neck supple.     Neurological: He is alert and oriented to person, place, and time. He has normal strength.         ED Course   Procedures  Labs Reviewed   COMPREHENSIVE METABOLIC PANEL - Abnormal       Result Value    Sodium 142      Potassium 3.8      Chloride 109      CO2 21 (*)     Glucose 91      BUN 6      Creatinine 0.7      Calcium 8.5 (*)     Protein Total 8.0      Albumin 4.0      Bilirubin Total 0.3             (*)     ALT 83 (*)     Anion Gap 12      eGFR >60     CBC WITH DIFFERENTIAL - Abnormal    WBC 4.77      RBC 4.94      HGB 12.2 (*)     HCT 39.8 (*)     MCV 81 (*)     MCH 24.7 (*)     MCHC 30.7 (*)     RDW 20.0 (*)     Platelet Count 220      MPV 9.9      Nucleated RBC 0      Neut % 29.6 (*)     Lymph % 56.4 (*)     Mono % 10.1      Eos % 3.1      Basophil % 0.8      Imm Grans % 0.0      Neut # 1.41 (*)     Lymph # 2.69      Mono # 0.48      Eos # 0.15      Baso # 0.04      Imm Grans # 0.00     LIPASE - Normal    Lipase Level 14     MAGNESIUM - Normal    Magnesium  2.5     CBC W/ AUTO  DIFFERENTIAL    Narrative:     The following orders were created for panel order CBC auto differential.  Procedure                               Abnormality         Status                     ---------                               -----------         ------                     CBC with Differential[[id number]]       Abnormal            Final result                 Please view results for these tests on the individual orders.          Imaging Results    None          Medications   0.9% NaCl infusion (has no administration in time range)   prochlorperazine injection Soln 10 mg (10 mg Intravenous Given 5/6/25 0242)   diphenhydrAMINE injection 12.5 mg (12.5 mg Intravenous Given 5/6/25 0233)   HYDROmorphone injection 1 mg (1 mg Intravenous Given 5/6/25 0235)   pantoprazole injection 40 mg (40 mg Intravenous Given 5/6/25 0235)     Medical Decision Making  25-year-old male with history of alcohol-induced pancreatitis presents with report of nausea, vomiting, left upper quadrant pain and hematemesis throughout the day since leaving the emergency department yesterday morning.  On exam he is well-appearing with mild diffuse abdominal tenderness and normal vital signs.  His workup was initiated in triage but labs still pending.  Will give IV fluids, Zofran, Protonix and Dilaudid with plan to place in observation when workup is complete.    Despite report of numerous episodes of hematemesis over the past 48 hours the patient's H&H is stable.  LFTs slightly elevated but also stable compared to yesterday's labs.  Lipase remains normal.    Risk  Prescription drug management.                                      Clinical Impression:  Final diagnoses:  [K85.90, K86.1] Acute on chronic pancreatitis (Primary)          ED Disposition Condition    Observation                     [1]   Social History  Tobacco Use    Smoking status: Former     Types: Cigarettes    Smokeless tobacco: Never   Substance Use Topics    Alcohol use: Yes      Alcohol/week: 4.0 standard drinks of alcohol     Types: 4 Shots of liquor per week    Drug use: Yes     Types: Marijuana        Ellie Portillo MD  05/06/25 0240

## 2025-05-06 NOTE — ASSESSMENT & PLAN NOTE
- today's labs show no elevation of lipase and , with ALT of 83  - hydrate with IV fluids  - p.r.n. medications for nausea, abdominal pain ordered

## 2025-05-06 NOTE — PLAN OF CARE
Case Management Assessment     PCP: schedule on discharge  Pharmacy: at bedside    Patient Arrived From: home  Existing Help at Home: none    Barriers to Discharge: none    Discharge Plan:    A. home   B. Home with family      Patient  is AAOx3 and independent at baseline. Denies owning any DME. PCP is correct on facesheet however patient would like to be schedule with an [facility]. Patient states they will get an uber home or use public transportation.     05/06/25 1414   Discharge Assessment   Assessment Type Discharge Planning Assessment   Confirmed/corrected address, phone number and insurance Yes   Confirmed Demographics Correct on Facesheet   Source of Information patient   Reason For Admission Hematemesis   People in Home alone   Do you expect to return to your current living situation? Yes   Do you have help at home or someone to help you manage your care at home? No   Prior to hospitilization cognitive status: Alert/Oriented   Current cognitive status: Alert/Oriented   Walking or Climbing Stairs Difficulty no   Dressing/Bathing Difficulty no   Equipment Currently Used at Home none   Readmission within 30 days? Yes   Do you currently have service(s) that help you manage your care at home? No   Do you take prescription medications? Yes   Do you have prescription coverage? Yes   Coverage Payor: MEDICAID - [facility] -   Do you have any problems affording any of your prescribed medications? No   Is the patient taking medications as prescribed? yes   Who is going to help you get home at discharge? wants to get a uber home   How do you get to doctors appointments? car, drives self;other (see comments)   Are you on dialysis? No   Do you take coumadin? No   Discharge Plan A Home   Discharge Plan B Home   DME Needed Upon Discharge  none   Discharge Plan discussed with: Patient   Transition of Care Barriers None     [facility] - Emergency Dept (Observation)  Initial Discharge  Assessment       Primary Care Provider: Pina Jones NP    Admission Diagnosis: Acute on chronic pancreatitis [K85.90, K86.1]    Admission Date: 5/6/2025  Expected Discharge Date:     Transition of Care Barriers: (P) None    Payor: MEDICAID / Plan: AETNA [facility] / Product Type: Managed Medicaid /     Extended Emergency Contact Information  Primary Emergency Contact: Denice Cardona  Mobile Phone: [phone]  Relation: Mother  Preferred language: English   needed? No    Discharge Plan A: (P) Home  Discharge Plan B: (P) Home      [facility] - [address]  [address]  [facility] [address]  Phone: [phone] Fax: [phone]    [facility]  [address]  [facility] [address]  Phone: [phone] Fax: [phone]    [facility]  [address]  [facility] [address]  Phone: [phone] Fax: [phone]    [facility]  [address] [address]  Phone: [phone] Fax: [phone]      Initial Assessment (most recent)       Adult Discharge Assessment - 05/06/25 1414          Discharge Assessment    Assessment Type Discharge Planning Assessment (P)      Confirmed/corrected address, phone number and insurance Yes (P)      Confirmed Demographics Correct on Facesheet (P)      Source of Information patient (P)      Reason For Admission Hematemesis (P)      People in Home alone (P)      Do you expect to return to your current living situation? Yes (P)      Do you have help at home or someone to help you manage your care at home? No (P)      Prior to hospitilization cognitive status: Alert/Oriented (P)      Current cognitive status: Alert/Oriented (P)      Walking or Climbing Stairs Difficulty no (P)      Dressing/Bathing Difficulty no (P)      Equipment Currently Used at Home none (P)      Readmission within 30 days? Yes (P)      Do you currently have  service(s) that help you manage your care at home? No (P)      Do you take prescription medications? Yes (P)      Do you have prescription coverage? Yes (P)      Coverage Payor: MEDICAID - [facility] - (P)      Do you have any problems affording any of your prescribed medications? No (P)      Is the patient taking medications as prescribed? yes (P)      Who is going to help you get home at discharge? wants to get a uber home (P)      How do you get to doctors appointments? car, drives self;other (see comments) (P)      Are you on dialysis? No (P)      Do you take coumadin? No (P)      Discharge Plan A Home (P)      Discharge Plan B Home (P)      DME Needed Upon Discharge  none (P)      Discharge Plan discussed with: Patient (P)      Transition of Care Barriers None (P)

## 2025-05-06 NOTE — Clinical Note
Diagnosis: Acute on chronic pancreatitis [[id number]]   Future Attending Provider: LEANDRO SALES [[id number]]

## 2025-05-06 NOTE — ED NOTES
05/06/25 0647   Safety Interventions   Quick Updates - Free Text NADN.   Updates Patient is resting comfortably;Bed rails up - Call light within reach;Vitals stable   Patient Rounds bed in low position;bed wheels locked;call light in patient/parent reach;clutter free environment maintained;ID band on;placement of personal items at bedside;visualized patient   Hourly Rounding Yes

## 2025-05-06 NOTE — ED TRIAGE NOTES
Tasia Cardona, an 25 y.o. male presents to the ED via POV  [id number] p/w/d ambulated into assigned room 11 at his own accord without issue.  C/O LUQ abd pain w/ NVD; bright red hematemesis & blood in stool.  Admits to drinking alcohol two days prior for his birthday but denies any drug usage.    *Hx of pancreatitis LUQ presenting soft but tender to touch (no rebound / no bulging mass).  *Not actively vomiting at this time          (-)fever, body aches or chills  (-)cp, sob, dizziness or weakness at this time  (-)difficulty voiding / flank discomfort  (-)neuro deficits

## 2025-05-06 NOTE — ED NOTES
Pt awake and alert; resting quietly on stretcher. Pt is reporting some relief in pain since receiving medication; currently rating pain as a 7/10. No acute distress observed.  Pt requesting jello; provided. Pt denies restroom needs at this time; is able to reposition self on stretcher. Bed locked in lowest position; side rails up and locked x 2; call light, bedside table, and personal belongings within reach. Room assessed for safety measures and cleanliness; no action needed at this time.  Pt instructed to alert nurse for assistance and before attempting to get out of bed; verbalizes understanding. Pt denies needs or complaints at this time; monitoring ongoing.

## 2025-05-06 NOTE — H&P
[facility] Medicine  History & Physical    Patient Name: Tasia Cardona  MRN: [id number]  Patient Class: OP- Observation  Admission Date: 5/6/2025  Attending Physician: LEANDRO Collins MD   Primary Care Provider: Pina Jones NP         Patient information was obtained from patient, past medical records, and ER records.     Subjective:     Principal Problem:Hematemesis    Chief Complaint:   Chief Complaint   Patient presents with    Abdominal Pain     LUQ ABD pain, diarrhea and bright right hematemesis onset yesterday worsening this morning. Denies blood in stool. Unable to tolerate water. Hx pancreatitis.         HPI: Mr. Tasia Cardona is a 25 y.o. male, with PMH of chronic alcohol-induced pancreatitis, prior necrotizing pancreatitis, alcohol use disorder, Corinne-hitchcock tear, splenic vein thrombosis, alcohol induced gastritis, HIV, asthma, anemia, polycythemia vera, who presented to [facility] ED on  5/5/25 due to LUQ abdominal pain. He reports that he has been vomiting bright red blood beginning 5/4/25 and worsening throughout the day on 5/5/25. He states he can not tolerate PO intake. He denied BRBPR, melena, hematochezia. He notes associated light headedness. During his ED visit on 5/4/25 he was advised to remain for workup of upper GI bleeding, but declined admission due to need to arrange for pet care. In the ED today, he was evaluated with a CBC that showed no leukocytosis or left shift.  H&H were 12.2/39.8.  A metabolic panel was without acute findings, but did show , ALT 83.  Lipase was not elevated at 14.  He was treated in the ED with Compazine, Benadryl, Dilaudid. He was placed on observation.     Past Medical History:   Diagnosis Date    Acute necrotizing pancreatitis 09/20/2023    Alcohol use disorder 10/05/2023    Asthma     Chronic pancreatitis 09/20/2023    Hepatitis C antibody positive in blood 09/18/2023 9/2023 PCR negative    HIV infection 10/2021     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       Past Surgical History:   Procedure Laterality Date    ENDOSCOPIC ULTRASOUND OF UPPER GASTROINTESTINAL TRACT N/A 10/23/2023    Procedure: ULTRASOUND, UPPER GI TRACT, ENDOSCOPIC;  Surgeon: Emil Villatoro MD;  Location: [facility] (2ND FLR);  Service: Endoscopy;  Laterality: N/A;    ENDOSCOPIC ULTRASOUND OF UPPER GASTROINTESTINAL TRACT N/A 7/24/2024    Procedure: ULTRASOUND, UPPER GI TRACT, ENDOSCOPIC;  Surgeon: Faustino Trujillo MD;  Location: [facility] (2ND FLR);  Service: Endoscopy;  Laterality: N/A;    ERCP N/A 10/23/2023    Procedure: ERCP (ENDOSCOPIC RETROGRADE CHOLANGIOPANCREATOGRAPHY);  Surgeon: Emil Villatoro MD;  Location: [facility] (2ND FLR);  Service: Endoscopy;  Laterality: N/A;    ESOPHAGOGASTRODUODENOSCOPY N/A 9/18/2023    Procedure: EGD (ESOPHAGOGASTRODUODENOSCOPY);  Surgeon: Kg Cleaning MD;  Location: [facility] (2ND FLR);  Service: Endoscopy;  Laterality: N/A;    ESOPHAGOGASTRODUODENOSCOPY N/A 3/8/2024    Procedure: EGD (ESOPHAGOGASTRODUODENOSCOPY);  Surgeon: Greg Love MD;  Location: [facility] ENDO (2ND FLR);  Service: Endoscopy;  Laterality: N/A;    ESOPHAGOGASTRODUODENOSCOPY N/A 7/24/2024    Procedure: EGD (ESOPHAGOGASTRODUODENOSCOPY);  Surgeon: Faustino Trujillo MD;  Location: [facility] ENDO (2ND FLR);  Service: Endoscopy;  Laterality: N/A;       Review of patient's allergies indicates:   Allergen Reactions    [place] Swelling    Droperidol Hives    Pecan nut Swelling    Penicillins Hives     Tolerates cephalosporins    Tolerated piperacillin/tazobactam 11/2023    Sulfa (sulfonamide antibiotics) Hives    Tolonium chloride(toluidine blue-o) Hives, Itching and Rash    Toradol [ketorolac] Rash       No current facility-administered medications on  file prior to encounter.     Current Outpatient Medications on File Prior to Encounter   Medication Sig    acetaminophen (TYLENOL) 500 MG tablet Take 2 tablets (1,000 mg total) by mouth every 8 (eight) hours as needed for Pain.    giwffvvfb-ykvdofyl-exoywgl ala (BIKTARVY) -25 mg (25 kg or greater) Take 1 tablet by mouth once daily.    folic acid (FOLVITE) 1 MG tablet Take 1 tablet (1 mg total) by mouth once daily.    ondansetron (ZOFRAN-ODT) 4 MG TbDL Take 2 tablets (8 mg total) by mouth every 12 (twelve) hours as needed (Nausea).    oxyCODONE (ROXICODONE) 5 MG immediate release tablet Take 1 tablet (5 mg total) by mouth every 8 (eight) hours as needed (Pain not controlled by acetaminophen).    pantoprazole (PROTONIX) 40 MG tablet Take 1 tablet (40 mg total) by mouth once daily.    SENNA 8.6 mg tablet Take 1 tablet by mouth once daily.    thiamine 100 MG tablet Take 1 tablet (100 mg total) by mouth once daily.     Family History       Problem Relation (Age of Onset)    Breast cancer Maternal Grandmother    Cancer Mother, Brother    No Known Problems Father    Ovarian cancer Mother    Pancreatitis Mother          Tobacco Use    Smoking status: Former     Types: Cigarettes    Smokeless tobacco: Never   Substance and Sexual Activity    Alcohol use: Yes     Alcohol/week: 4.0 standard drinks of alcohol     Types: 4 Shots of liquor per week    Drug use: Yes     Types: Marijuana    Sexual activity: Not on file     Review of Systems   Constitutional:  Negative for activity change, appetite change, chills, diaphoresis and fever.   HENT:  Negative for ear pain, nosebleeds, sore throat and trouble swallowing.    Respiratory:  Negative for cough, shortness of breath and wheezing.    Cardiovascular:  Negative for chest pain and palpitations.   Gastrointestinal:  Positive for abdominal pain and vomiting. Negative for blood in stool, constipation, diarrhea and nausea.   Genitourinary:  Negative for decreased urine volume,  difficulty urinating, dysuria, frequency, hematuria and urgency.   Musculoskeletal:  Negative for back pain, joint swelling, myalgias, neck pain and neck stiffness.   Skin:  Negative for color change and pallor.   Neurological:  Negative for dizziness, syncope, weakness, light-headedness and headaches.   Hematological:  Does not bruise/bleed easily.   Psychiatric/Behavioral:  Negative for agitation and confusion.      Objective:     Vital Signs (Most Recent):  Temp: 98 °F (36.7 °C) (05/05/25 2131)  Pulse: 66 (05/06/25 0500)  Resp: 16 (05/06/25 0458)  BP: 99/63 (05/06/25 0500)  SpO2: 99 % (05/06/25 0500) Vital Signs (24h Range):  Temp:  [98 °F (36.7 °C)] 98 °F (36.7 °C)  Pulse:  [] 66  Resp:  [16-20] 16  SpO2:  [96 %-100 %] 99 %  BP: ()/(47-84) 99/63     Weight: 77.1 kg (170 lb)  Body mass index is 24.39 kg/m².     Physical Exam  Vitals and nursing note reviewed.   Constitutional:       General: He is not in acute distress.     Appearance: Normal appearance. He is well-developed and normal weight. He is not ill-appearing, toxic-appearing or diaphoretic.   HENT:      Head: Normocephalic and atraumatic.      Right Ear: External ear normal.      Left Ear: External ear normal.   Eyes:      General: No scleral icterus.        Right eye: No discharge.         Left eye: No discharge.      Conjunctiva/sclera: Conjunctivae normal.   Neck:      Vascular: No JVD.      Trachea: No tracheal deviation.   Cardiovascular:      Rate and Rhythm: Normal rate and regular rhythm.      Heart sounds: Normal heart sounds. No murmur heard.     No gallop.   Pulmonary:      Effort: Pulmonary effort is normal. No respiratory distress.      Breath sounds: Normal breath sounds. No stridor. No wheezing or rales.   Abdominal:      General: Bowel sounds are normal. There is no distension.      Palpations: Abdomen is soft. There is no mass.      Tenderness: There is abdominal tenderness (diffuse). There is no guarding.   Musculoskeletal:   "       General: No deformity. Normal range of motion.      Cervical back: Normal range of motion and neck supple.   Skin:     General: Skin is warm and dry.   Neurological:      General: No focal deficit present.      Mental Status: He is alert and oriented to person, place, and time.      Cranial Nerves: No cranial nerve deficit.      Motor: No abnormal muscle tone.      Coordination: Coordination normal.   Psychiatric:         Mood and Affect: Mood normal.         Behavior: Behavior normal.         Thought Content: Thought content normal.         Judgment: Judgment normal.                Significant Labs: All pertinent labs within the past 24 hours have been reviewed.  BMP:   Recent Labs   Lab 05/06/25  0413   GLU 86      K 3.5   *   CO2 16*   BUN 7   CREATININE 0.6   CALCIUM 7.9*   MG 2.2     CBC:   Recent Labs   Lab 05/04/25 2049 05/06/25  0207   WBC 4.74 4.77   HGB 12.6* 12.2*   HCT 39.9* 39.8*    220     CMP:   Recent Labs   Lab 05/04/25 2049 05/06/25 0207 05/06/25 0413    142 143   K 3.7 3.8 3.5    109 114*   CO2 22* 21* 16*   GLU 95 91 86   BUN 7 6 7   CREATININE 0.8 0.7 0.6   CALCIUM 9.0 8.5* 7.9*   PROT 7.8 8.0 6.9   ALBUMIN 4.1 4.0 3.4*   BILITOT 0.9 0.3 0.3   ALKPHOS 104 104 97   * 114* 105*   ALT 73* 83* 71*   ANIONGAP 13 12 13     Urine Culture: No results for input(s): "LABURIN" in the last 48 hours.  Urine Studies: No results for input(s): "COLORU", "APPEARANCEUA", "PHUR", "SPECGRAV", "PROTEINUA", "GLUCUA", "KETONESU", "BILIRUBINUA", "OCCULTUA", "NITRITE", "UROBILINOGEN", "LEUKOCYTESUR", "RBCUA", "WBCUA", "BACTERIA", "SQUAMEPITHEL", "HYALINECASTS" in the last 48 hours.    Invalid input(s): "WRIGHTSUR"    Significant Imaging: I have reviewed all pertinent imaging results/findings within the past 24 hours.  Imaging Results    None          Assessment/Plan:     Assessment & Plan  Hematemesis  - Mr. Tasia Cardona presents after two days of hematemesis   - he " "reports multiple episodes of emesis with increasing amounts of BRB with episodes of emesis   - H&H have remained stable   - Patient's hemorrhage is due to GI bleed vs. Gastritis vs. Corinne-Chauhan tear, this bleeding is not associated with a medication or a coagulopathy. Patients most recent Hgb, Hct, platelets, and INR are listed below.  Recent Labs     05/04/25  2049 05/06/25  0207 05/06/25  0417   HGB 12.6* 12.2*  --    HCT 39.9* 39.8*  --     220  --    INR 1.0  --  1.1     Plan  - Will trend hemoglobin/hematocrit Every 8 hours  - Will monitor and correct any coagulation defects  - Will transfuse if Hgb is <7g/dl (<8g/dl in cases of active ACS) or if patient has rapid bleeding leading to hemodynamic instability  Alcohol-induced chronic pancreatitis  - today's labs show no elevation of lipase and , with ALT of 83  - hydrate with IV fluids  - p.r.n. medications for nausea, abdominal pain ordered    Pancreatic pseudocyst/cyst  - history noted  - ordered CT to evaluate after patient's prior admission, patient initially refused, and then agreed but CT was never performed    HIV infection  - continue Biktarvy  - last documented labs to evaluate were from 2023: "Diagnosed on screening test 10/2021... Last CD4 1080 on 10/12/23. Last ID appointment 4/3 but no note posted."  - follow-up as outpatient with Infectious Disease  Mild intermittent asthma without complication  - no symptoms of exacerbation at present  - monitor and provide p.r.n. DuoNebs if needed      VTE Risk Mitigation (From admission, onward)           Ordered     IP VTE HIGH RISK PATIENT  Once         05/06/25 0334     Place sequential compression device  Until discontinued         05/06/25 0334                         On 05/06/2025, patient should be placed in hospital observation services under the care of Dr. LEANDRO Collins MD.           Jennyfer Mckinley PA-C  Department of Hospital Medicine  [facility] - Emergency Dept          "

## 2025-05-06 NOTE — PLAN OF CARE
"   05/06/25 1435   Readmission   Was this a planned readmission? No   Why were you hospitalized in the last 30 days? Hematemesis   Why were you readmitted? Related to previous admission   When you left the hospital how did you feel? "i felt good"   When you left the hospital where did you go? Home Alone   Did patient/caregiver refused recommended DC plan? No   When did you start not feeling well? May 5th, 2025   Did you try to manage your symptoms your self? No   Did you call anyone? Yes   Who did you call? Emergency Room   Did you try to see or did see a doctor or nurse before you came? No   Why? I just came here beecause it felt like the same thing that was happening   Did you have  a follow-up appointment on discharge? Yes   Did you go? No   Why? Not feeling well       "

## 2025-05-06 NOTE — ASSESSMENT & PLAN NOTE
- Mr. Tasia Cardona presents after two days of hematemesis   - he reports multiple episodes of emesis with increasing amounts of BRB with episodes of emesis   - H&H have remained stable   - Patient's hemorrhage is due to GI bleed vs. Gastritis vs. Corinne-Chauhan tear, this bleeding is not associated with a medication or a coagulopathy. Patients most recent Hgb, Hct, platelets, and INR are listed below.  Recent Labs     05/04/25  2049 05/06/25  0207 05/06/25  0417   HGB 12.6* 12.2*  --    HCT 39.9* 39.8*  --     220  --    INR 1.0  --  1.1     Plan  - Will trend hemoglobin/hematocrit Every 8 hours  - Will monitor and correct any coagulation defects  - Will transfuse if Hgb is <7g/dl (<8g/dl in cases of active ACS) or if patient has rapid bleeding leading to hemodynamic instability

## 2025-05-06 NOTE — SUBJECTIVE & OBJECTIVE
Interval History: No acute events overnight.  Appears comfortable, asking for food and with strange affect also asking for iv dilaudid.  Reports it was his birthday 2 days ago and he drank alcohol.  All questions answered and patient had no further complaints.    Objective:     Vital Signs (Most Recent):  Temp: 98.2 °F (36.8 °C) (05/06/25 1118)  Pulse: 79 (05/06/25 1100)  Resp: 17 (05/06/25 0900)  BP: (!) 97/55 (05/06/25 1100)  SpO2: 98 % (05/06/25 1100) Vital Signs (24h Range):  Temp:  [98 °F (36.7 °C)-98.2 °F (36.8 °C)] 98.2 °F (36.8 °C)  Pulse:  [] 79  Resp:  [13-20] 17  SpO2:  [96 %-100 %] 98 %  BP: ()/(47-84) 97/55     Weight: 77.1 kg (170 lb)  Body mass index is 24.39 kg/m².    Intake/Output Summary (Last 24 hours) at 5/6/2025 1143  Last data filed at 5/6/2025 1012  Gross per 24 hour   Intake 2200 ml   Output --   Net 2200 ml         Physical Exam  Vitals and nursing note reviewed.   Constitutional:       General: He is not in acute distress.     Appearance: Normal appearance. He is well-developed and normal weight. He is not ill-appearing, toxic-appearing or diaphoretic.   HENT:      Head: Normocephalic and atraumatic.      Right Ear: External ear normal.      Left Ear: External ear normal.      Mouth/Throat:      Mouth: Mucous membranes are moist.   Eyes:      General: No scleral icterus.        Right eye: No discharge.         Left eye: No discharge.      Conjunctiva/sclera: Conjunctivae normal.   Neck:      Vascular: No JVD.      Trachea: No tracheal deviation.   Cardiovascular:      Rate and Rhythm: Normal rate and regular rhythm.      Heart sounds: Normal heart sounds. No murmur heard.     No gallop.   Pulmonary:      Effort: Pulmonary effort is normal. No respiratory distress.      Breath sounds: Normal breath sounds. No stridor. No wheezing or rales.   Abdominal:      General: Bowel sounds are normal. There is no distension.      Palpations: Abdomen is soft. There is no mass.       Tenderness: There is abdominal tenderness (diffuse). There is no guarding.   Musculoskeletal:         General: No deformity. Normal range of motion.      Cervical back: Normal range of motion and neck supple.   Skin:     General: Skin is warm and dry.   Neurological:      General: No focal deficit present.      Mental Status: He is alert and oriented to person, place, and time. Mental status is at baseline.      Cranial Nerves: No cranial nerve deficit.      Motor: No abnormal muscle tone.      Coordination: Coordination normal.   Psychiatric:      Comments: Bright mood, laughs easily, makes strange jokes, somewhat strange affect.               Significant Labs: All pertinent labs within the past 24 hours have been reviewed.    Significant Imaging: I have reviewed all pertinent imaging results/findings within the past 24 hours.

## 2025-05-06 NOTE — ASSESSMENT & PLAN NOTE
"- continue Biktarvy  - last documented labs to evaluate were from 2023: "Diagnosed on screening test 10/2021... Last CD4 1080 on 10/12/23. Last ID appointment 4/3 but no note posted."  - follow-up as outpatient with Infectious Disease  "

## 2025-05-06 NOTE — ASSESSMENT & PLAN NOTE
-Placed in observation status  -Presented with nausea, vomiting and abdominal pain after drinking alcohol for his birthday.  Reports only small amount of bright red blood in his emesis  -Noted history of chronic pancreatitis.  Patient reports it was secondary to splenic vein thrombosis.  -Lipase is normal  -CT Abdomen / Pelvis shows acute pancreatitis with similar peripancreatic fat stranding and small peripancreatic fluid collection, no worsening inflammatory changes, chronic splenic vein thrombosis with collateral vessels in the upper abdomen, as seen previously.  Hepatosplenomegaly and hepatic steatosis.  -Hb 12.2 on admit and now after IV fluids 10.2  -GI consulted and input appreciated  -Allow clear liquids and continue to trend H/H  -Monitor CIWA - no signs of withdrawal thus far  -Continue BID PPI, Folic acid, thiamine and MVI  -Continue antiemetics and oral / IV prn analgesics.  ?Drug seeking behavior?.

## 2025-05-06 NOTE — FIRST PROVIDER EVALUATION
"Medical screening examination initiated.  I have conducted a focused provider triage encounter, findings are as follows:    Brief history of present illness: LUQ abdominal pain, nausea, hematemesis and non bloody diarrhea that restarted this morning. Was seen in the ED yesterday. Diagnosed with alcohol induced pancreatitis. States that he left the ED because he had to go take care of his dog. Denies drinking alcohol since leaving the ER yesterday. No symptom relief with promethazine.     Vitals:    05/05/25 2131   BP: 101/70   BP Location: Left arm   Pulse: 104   Resp: 20   Temp: 98 °F (36.7 °C)   TempSrc: Oral   SpO2: 98%   Weight: 77.1 kg (170 lb)   Height: 5' 10" (1.778 m)       Pertinent physical exam:  Tacky mucous membranes. Heart and lungs CTA. Unable to perform abdominal exam.     Brief workup plan: Labs.     Preliminary workup initiated; this workup will be continued and followed by the physician or advanced practice provider that is assigned to the patient when roomed.  "

## 2025-05-06 NOTE — ASSESSMENT & PLAN NOTE
- no symptoms of exacerbation at present  - monitor and provide p.r.n. DuoNebs if needed     Detail Level: Zone Detail Level: Generalized Detail Level: Simple Detail Level: Detailed When Should The Patient Follow-Up For Their Next Full-Body Skin Exam?: 6 Months Quality 137: Melanoma: Continuity Of Care - Recall System: Patient information entered into a recall system that includes: target date for the next exam specified AND a process to follow up with patients regarding missed or unscheduled appointments

## 2025-05-06 NOTE — ASSESSMENT & PLAN NOTE
- history noted  - ordered CT to evaluate after patient's prior admission, patient initially refused, and then agreed but CT was never performed

## 2025-05-06 NOTE — HPI
Mr. Tasia Cardona is a 25 y.o. male, with PMH of chronic alcohol-induced pancreatitis, prior necrotizing pancreatitis, alcohol use disorder, Corinne-hitchcock tear, splenic vein thrombosis, alcohol induced gastritis, HIV, asthma, anemia, polycythemia vera, who presented to [facility] [place] [place] ED on  5/5/25 due to LUQ abdominal pain. He reports that he has been vomiting bright red blood beginning 5/4/25 and worsening throughout the day on 5/5/25. He states he can not tolerate PO intake. He denied BRBPR, melena, hematochezia. He notes associated light headedness. During his ED visit on 5/4/25 he was advised to remain for workup of upper GI bleeding, but declined admission due to need to arrange for pet care. In the ED today, he was evaluated with a CBC that showed no leukocytosis or left shift.  H&H were 12.2/39.8.  A metabolic panel was without acute findings, but did show , ALT 83.  Lipase was not elevated at 14.  He was treated in the ED with Compazine, Benadryl, Dilaudid. He was placed on observation.

## 2025-05-06 NOTE — SUBJECTIVE & OBJECTIVE
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       Past Surgical History:   Procedure Laterality Date    ENDOSCOPIC ULTRASOUND OF UPPER GASTROINTESTINAL TRACT N/A 10/23/2023    Procedure: ULTRASOUND, UPPER GI TRACT, ENDOSCOPIC;  Surgeon: Emil Villatoro MD;  Location: [facility] ([address] [place] [address]);  Service: Endoscopy;  Laterality: N/A;    ENDOSCOPIC ULTRASOUND OF UPPER GASTROINTESTINAL TRACT N/A 7/24/2024    Procedure: ULTRASOUND, UPPER GI TRACT, ENDOSCOPIC;  Surgeon: Faustino Trujillo MD;  Location: [facility] ([address] [place] [address]);  Service: Endoscopy;  Laterality: N/A;    ERCP N/A 10/23/2023    Procedure: ERCP (ENDOSCOPIC RETROGRADE CHOLANGIOPANCREATOGRAPHY);  Surgeon: Emil Villatoro MD;  Location: [facility] (2ND FLR);  Service: Endoscopy;  Laterality: N/A;    ESOPHAGOGASTRODUODENOSCOPY N/A 9/18/2023    Procedure: EGD (ESOPHAGOGASTRODUODENOSCOPY);  Surgeon: Kg Cleaning MD;  [facility]  Service: Endoscopy;  Laterality: N/A;    ESOPHAGOGASTRODUODENOSCOPY N/A 3/8/2024    Procedure: EGD (ESOPHAGOGASTRODUODENOSCOPY);  Surgeon: Greg Love MD;  Location: [facility] END[facility] (2ND FLR);  Service: Endoscopy;  Laterality: N/A;    ESOPHAGOGASTRODUODENOSCOPY N/A 7/24/2024    Procedure: EGD (ESOPHAGOGASTRODUODENOSCOPY);  Surgeon: Faustino Trujillo MD;  Location: [facility] ENDO (2ND FLR);  Service: Endoscopy;  Laterality: N/A;       Review of patient's allergies indicates:   Allergen Reactions    [place] Swelling    Droperidol Hives    Pecan nut Swelling     Penicillins Hives     Tolerates cephalosporins    Tolerated piperacillin/tazobactam 11/2023    Sulfa (sulfonamide antibiotics) Hives    Tolonium chloride(toluidine blue-o) Hives, Itching and Rash    Toradol [ketorolac] Rash       No current facility-administered medications on file prior to encounter.     Current Outpatient Medications on File Prior to Encounter   Medication Sig    acetaminophen (TYLENOL) 500 MG tablet Take 2 tablets (1,000 mg total) by mouth every 8 (eight) hours as needed for Pain.    oxkbikeyp-krrhmchw-imfeujq ala (BIKTARVY) -25 mg (25 kg or greater) Take 1 tablet by mouth once daily.    folic acid (FOLVITE) 1 MG tablet Take 1 tablet (1 mg total) by mouth once daily.    ondansetron (ZOFRAN-ODT) 4 MG TbDL Take 2 tablets (8 mg total) by mouth every 12 (twelve) hours as needed (Nausea).    oxyCODONE (ROXICODONE) 5 MG immediate release tablet Take 1 tablet (5 mg total) by mouth every 8 (eight) hours as needed (Pain not controlled by acetaminophen).    pantoprazole (PROTONIX) 40 MG tablet Take 1 tablet (40 mg total) by mouth once daily.    SENNA 8.6 mg tablet Take 1 tablet by mouth once daily.    thiamine 100 MG tablet Take 1 tablet (100 mg total) by mouth once daily.     Family History       Problem Relation (Age of Onset)    Breast cancer Maternal Grandmother    Cancer Mother, Brother    No Known Problems Father    Ovarian cancer Mother    Pancreatitis Mother          Tobacco Use    Smoking status: Former     Types: Cigarettes    Smokeless tobacco: Never   Substance and Sexual Activity    Alcohol use: Yes     Alcohol/week: 4.0 standard drinks of alcohol     Types: 4 Shots of liquor per week    Drug use: Yes     Types: Marijuana    Sexual activity: Not on file     Review of Systems   Constitutional:  Negative for activity change, appetite change, chills, diaphoresis and fever.   HENT:  Negative for ear pain, nosebleeds, sore throat and trouble swallowing.    Respiratory:  Negative for cough,  shortness of breath and wheezing.    Cardiovascular:  Negative for chest pain and palpitations.   Gastrointestinal:  Positive for abdominal pain and vomiting. Negative for blood in stool, constipation, diarrhea and nausea.   Genitourinary:  Negative for decreased urine volume, difficulty urinating, dysuria, frequency, hematuria and urgency.   Musculoskeletal:  Negative for back pain, joint swelling, myalgias, neck pain and neck stiffness.   Skin:  Negative for color change and pallor.   Neurological:  Negative for dizziness, syncope, weakness, light-headedness and headaches.   Hematological:  Does not bruise/bleed easily.   Psychiatric/Behavioral:  Negative for agitation and confusion.      Objective:     Vital Signs (Most Recent):  Temp: 98 °F (36.7 °C) (05/05/25 2131)  Pulse: 66 (05/06/25 0500)  Resp: 16 (05/06/25 0458)  BP: 99/63 (05/06/25 0500)  SpO2: 99 % (05/06/25 0500) Vital Signs (24h Range):  Temp:  [98 °F (36.7 °C)] 98 °F (36.7 °C)  Pulse:  [] 66  Resp:  [16-20] 16  SpO2:  [96 %-100 %] 99 %  BP: ()/(47-84) 99/63     Weight: 77.1 kg (170 lb)  Body mass index is 24.39 kg/m².     Physical Exam  Vitals and nursing note reviewed.   Constitutional:       General: He is not in acute distress.     Appearance: Normal appearance. He is well-developed and normal weight. He is not ill-appearing, toxic-appearing or diaphoretic.   HENT:      Head: Normocephalic and atraumatic.      Right Ear: External ear normal.      Left Ear: External ear normal.   Eyes:      General: No scleral icterus.        Right eye: No discharge.         Left eye: No discharge.      Conjunctiva/sclera: Conjunctivae normal.   Neck:      Vascular: No JVD.      Trachea: No tracheal deviation.   Cardiovascular:      Rate and Rhythm: Normal rate and regular rhythm.      Heart sounds: Normal heart sounds. No murmur heard.     No gallop.   Pulmonary:      Effort: Pulmonary effort is normal. No respiratory distress.      Breath sounds:  "Normal breath sounds. No stridor. No wheezing or rales.   Abdominal:      General: Bowel sounds are normal. There is no distension.      Palpations: Abdomen is soft. There is no mass.      Tenderness: There is abdominal tenderness (diffuse). There is no guarding.   Musculoskeletal:         General: No deformity. Normal range of motion.      Cervical back: Normal range of motion and neck supple.   Skin:     General: Skin is warm and dry.   Neurological:      General: No focal deficit present.      Mental Status: He is alert and oriented to person, place, and time.      Cranial Nerves: No cranial nerve deficit.      Motor: No abnormal muscle tone.      Coordination: Coordination normal.   Psychiatric:         Mood and Affect: Mood normal.         Behavior: Behavior normal.         Thought Content: Thought content normal.         Judgment: Judgment normal.                Significant Labs: All pertinent labs within the past 24 hours have been reviewed.  BMP:   Recent Labs   Lab 05/06/25  0413   GLU 86      K 3.5   *   CO2 16*   BUN 7   CREATININE 0.6   CALCIUM 7.9*   MG 2.2     CBC:   Recent Labs   Lab 05/04/25 2049 05/06/25  0207   WBC 4.74 4.77   HGB 12.6* 12.2*   HCT 39.9* 39.8*    220     CMP:   Recent Labs   Lab 05/04/25 2049 05/06/25  0207 05/06/25  0413    142 143   K 3.7 3.8 3.5    109 114*   CO2 22* 21* 16*   GLU 95 91 86   BUN 7 6 7   CREATININE 0.8 0.7 0.6   CALCIUM 9.0 8.5* 7.9*   PROT 7.8 8.0 6.9   ALBUMIN 4.1 4.0 3.4*   BILITOT 0.9 0.3 0.3   ALKPHOS 104 104 97   * 114* 105*   ALT 73* 83* 71*   ANIONGAP 13 12 13     Urine Culture: No results for input(s): "LABURIN" in the last 48 hours.  Urine Studies: No results for input(s): "COLORU", "APPEARANCEUA", "PHUR", "SPECGRAV", "PROTEINUA", "GLUCUA", "KETONESU", "BILIRUBINUA", "OCCULTUA", "NITRITE", "UROBILINOGEN", "LEUKOCYTESUR", "RBCUA", "WBCUA", "BACTERIA", "SQUAMEPITHEL", "HYALINECASTS" in the last 48 hours.    Invalid " "input(s): "ANÍBAL"    Significant Imaging: I have reviewed all pertinent imaging results/findings within the past 24 hours.  Imaging Results    None        "

## 2025-05-06 NOTE — CONSULTS
"Reason for Consult:  GI bleed    HPI:  Pt is a 25 y.o. male with PMHx of pancreatitis, ETOH abise, splenic v thrombosis, HIV, PCV and h/o GIB 2/2 MW tear and Grade D esophagitis who presents with epigastric pain "pancreatitis flare" and n/v. No blood in his stool. No f/c. He is still drinking alcohol. Occ tob, denies illicits. H/H on admit after hydration 10.2/ 32.9. BUN/ Cr 7/0.6. He continues to c/o abd pain. 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       Past Surgical History:   Procedure Laterality Date    ENDOSCOPIC ULTRASOUND OF UPPER GASTROINTESTINAL TRACT N/A 10/23/2023    Procedure: ULTRASOUND, UPPER GI TRACT, ENDOSCOPIC;  Surgeon: Emil Villatoro MD;  Location: [address]);  Service: Endoscopy;  Laterality: N/A;    ENDOSCOPIC ULTRASOUND OF UPPER GASTROINTESTINAL TRACT N/A 7/24/2024    Procedure: ULTRASOUND, UPPER GI TRACT, ENDOSCOPIC;  Surgeon: Faustino Trujillo MD;  Location: [address]);  Service: Endoscopy;  Laterality: N/A;    ERCP N/A 10/23/2023    Procedure: ERCP (ENDOSCOPIC RETROGRADE CHOLANGIOPANCREATOGRAPHY);  Surgeon: Emil Villatoro MD;  Location: [address]);  Service: Endoscopy;  Laterality: N/A;    ESOPHAGOGASTRODUODENOSCOPY N/A 9/18/2023    Procedure: EGD (ESOPHAGOGASTRODUODENOSCOPY);  Surgeon: Kg Cleaning MD;  Location: [facility] ([address] [place] [address]);  Service: Endoscopy;  Laterality: N/A;    ESOPHAGOGASTRODUODENOSCOPY N/A 3/8/2024    Procedure: EGD " (ESOPHAGOGASTRODUODENOSCOPY);  Surgeon: Greg Love MD;  [facility]  Service: Endoscopy;  Laterality: N/A;    ESOPHAGOGASTRODUODENOSCOPY N/A 7/24/2024    Procedure: EGD (ESOPHAGOGASTRODUODENOSCOPY);  Surgeon: Faustino Trujillo MD;  [facility]  Service: Endoscopy;  Laterality: N/A;       Family History   Problem Relation Name Age of Onset    Pancreatitis Mother      Cancer Mother      Ovarian cancer Mother      No Known Problems Father      Cancer Brother      Breast cancer Maternal Grandmother         Social History[1]    Endoscopic History:  EGD 3/2024 esophagitis  EUS pancreatitis and pancreatic cyst    Review of patient's allergies indicates:   Allergen Reactions    [place] Swelling    Droperidol Hives    Pecan nut Swelling    Penicillins Hives     Tolerates cephalosporins    Tolerated piperacillin/tazobactam 11/2023    Sulfa (sulfonamide antibiotics) Hives    Tolonium chloride(toluidine blue-o) Hives, Itching and Rash    Toradol [ketorolac] Rash       Medications Ordered Prior to Encounter[2]  Scheduled Meds:   srgpyufbj-kzunvfhw-nabpdgw ala  1 tablet Oral Daily    ciprofloxacin  400 mg Intravenous Q12H    folic acid  1 mg Oral Daily    multivitamin  1 tablet Oral Daily    pantoprazole  40 mg Intravenous BID    sodium chloride 0.9%  10 mL Intravenous Q8H    thiamine  100 mg Oral Daily     Continuous Infusions:   lactated ringers   Intravenous Continuous   Held at 05/06/25 0445     PRN Meds:.  Current Facility-Administered Medications:     acetaminophen, 650 mg, Oral, Q6H PRN    dextrose 50%, 12.5 g, Intravenous, PRN    dextrose 50%, 25 g, Intravenous, PRN    diazePAM, 5 mg, Oral, Q4H PRN    dicyclomine, 20 mg, Intramuscular, QID PRN    prochlorperazine, 10 mg, Intravenous, Q6H PRN **AND** diphenhydrAMINE, 25 mg, Intravenous, Q6H PRN    glucagon (human recombinant), 1 mg, Intramuscular, PRN    glucose, 16 g, Oral, PRN    glucose, 24 g, Oral, PRN    melatonin, 6  mg, Oral, Nightly PRN    naloxone, 0.02 mg, Intravenous, PRN    oxyCODONE-acetaminophen, 1 tablet, Oral, Q4H PRN    promethazine (PHENERGAN) 25 mg in 0.9% NaCl 50 mL IVPB, 25 mg, Intravenous, Q6H PRN    senna-docusate, 1 tablet, Oral, BID PRN    Review of Systems:  CONSTITUTIONAL: Negative for weakness, fever, weight loss, weight gain.  HEENT: Eyes: Negative for double/blurred vision. Ears: Negative pain or loss of hearing. Nose:Negative for nasal congestion. Negative for rhinorrhea Mouth: Negative for dry mouth/pain Throat: Negative for masses or hoarseness.  CARDIOVASCULAR: Negative for chest pain or palpitations.  RESPIRATORY: Negative for SOB, wheezes  GASTROINTESTINAL: See HPI  GENITOURINARY: Negative for dysuria/hematuria.  MUSCULOSKELETAL: Negative for osteoarthritis, muscle pain.  SKIN: Negative for rashes/lesions.  NEUROLOGIC: Negative for headaches, numbness/tingling.  ENDOCRINE: Negative for diabetes or thyroid abnormalities.  HEMATOLOGIC: Negative for anemia or blood dyscrasias.    Patient Vitals for the past 24 hrs:   BP Temp Temp src Pulse Resp SpO2 Height Weight   05/06/25 0900 109/66 -- -- (!) 57 17 99 % -- --   05/06/25 0800 109/64 -- -- 67 -- 98 % -- --   05/06/25 0700 (!) 98/58 -- -- 72 -- 98 % -- --   05/06/25 0600 105/67 -- -- 65 13 98 % -- --   05/06/25 0545 120/80 -- -- 89 15 99 % -- --   05/06/25 0543 116/73 -- -- 82 14 99 % -- --   05/06/25 0542 -- -- -- -- 17 -- -- --   05/06/25 0541 108/69 -- -- 82 17 99 % -- --   05/06/25 0500 99/63 -- -- 66 -- 99 % -- --   05/06/25 0458 -- -- -- -- 16 -- -- --   05/06/25 0453 -- -- -- -- 16 -- -- --   05/06/25 0432 (!) 94/52 -- -- 67 -- 100 % -- --   05/06/25 0400 (!) 83/47 -- -- 82 -- 96 % -- --   05/06/25 0300 122/68 -- -- 92 -- 97 % -- --   05/06/25 0243 128/84 -- -- 91 17 100 % -- --   05/06/25 0235 -- -- -- -- 16 -- -- --   05/06/25 0210 113/74 -- -- 90 -- 98 % -- --   05/06/25 0200 -- -- -- 92 -- 99 % -- --   05/06/25 0139 119/78 -- -- 94 16 100 %  "-- --   05/05/25 2131 101/70 98 °F (36.7 °C) Oral 104 20 98 % 5' 10" (1.778 m) 77.1 kg (170 lb)       Gen: Well developed, well nourished, no apparent distress, cooperative  HEENT: Anicteric, PERRLA, normocephalic, neck symmetrical  CV: S1, S2, no murmers/rubs, non-displaced PMI  Lungs: CTA-B, normal excursion  Abd: Soft, NT, ND, normal BS's, no HSM  Ext: No c/c/e, 1+ DP pulses to BLE's  Neuro: CN II-XII grossly intact, no asterixis.  Musculoskeletal: no weakness  Skin: No rashes/lesions, normal texture  Psych: AA&O x 4, normal affect    Labs:  Recent Labs   Lab 05/06/25  0413   CALCIUM 7.9*   ALBUMIN 3.4*   PROT 6.9      K 3.5   CO2 16*   *   BUN 7   CREATININE 0.6   ALKPHOS 97   ALT 71*   *   BILITOT 0.3     Recent Results (from the past 2 weeks)   CBC with Differential    Collection Time: 05/06/25  7:51 AM   Result Value Ref Range    WBC 4.31 3.90 - 12.70 K/uL    HGB 10.2 (L) 14.0 - 18.0 gm/dL    HCT 32.9 (L) 40.0 - 54.0 %    Platelet Count 168 150 - 450 K/uL   CBC with Differential    Collection Time: 05/06/25  2:07 AM   Result Value Ref Range    WBC 4.77 3.90 - 12.70 K/uL    HGB 12.2 (L) 14.0 - 18.0 gm/dL    HCT 39.8 (L) 40.0 - 54.0 %    Platelet Count 220 150 - 450 K/uL   CBC with Differential    Collection Time: 05/04/25  8:49 PM   Result Value Ref Range    WBC 4.74 3.90 - 12.70 K/uL    HGB 12.6 (L) 14.0 - 18.0 gm/dL    HCT 39.9 (L) 40.0 - 54.0 %    Platelet Count 220 150 - 450 K/uL     Recent Labs   Lab 05/06/25  0417   INR 1.1     Lipase 14  Imaging:  Impression:     Findings suggestive of acute pancreatitis with similar peripancreatic fat stranding and small peripancreatic fluid collection.  No worsening inflammatory changes.     Chronic splenic vein thrombosis with collateral vessels in the upper abdomen, as seen previously.     Hepatosplenomegaly and hepatic steatosis.     Distended urinary bladder.    Assessment:  Pt. Is a 25 y.o. male with acute on chronic pancreatitis, hematemesis " with stable anemia and multiple above medical issues      Recommendations:  1. IV hydration with LR  2. MVI,  folate and thiamine replacement  3. PPI bid  4. Monitor for ETOH withdrawal  5. Discussed ETOH cessation  6. Pain control and anti-emetics  7. Would not continue antibx for pancreatitis  8. Start clears      I would like to take this opportunity to thank you for this consult.  If you have any questions or concerns, please do not hesitate to contact me.             [1]   Social History  Socioeconomic History    Marital status: Single   Tobacco Use    Smoking status: Former     Types: Cigarettes    Smokeless tobacco: Never   Substance and Sexual Activity    Alcohol use: Yes     Alcohol/week: 4.0 standard drinks of alcohol     Types: 4 Shots of liquor per week    Drug use: Yes     Types: Marijuana     Social Drivers of Health     Financial Resource Strain: Patient Declined (4/29/2025)    Overall Financial Resource Strain (CARDIA)     Difficulty of Paying Living Expenses: Patient declined   Food Insecurity: Patient Declined (4/29/2025)    Hunger Vital Sign     Worried About Running Out of Food in the Last Year: Patient declined     Ran Out of Food in the Last Year: Patient declined   Transportation Needs: Patient Declined (4/29/2025)    PRAPARE - Transportation     Lack of Transportation (Medical): Patient declined     Lack of Transportation (Non-Medical): Patient declined   Physical Activity: Inactive (3/30/2025)    Exercise Vital Sign     Days of Exercise per Week: 0 days     Minutes of Exercise per Session: 0 min   Stress: Patient Declined (4/29/2025)    [employer] - Occupational Stress Questionnaire     Feeling of Stress : Patient declined   Housing Stability: Patient Declined (4/29/2025)    Housing Stability Vital Sign     Unable to Pay for Housing in the Last Year: Patient declined     Homeless in the Last Year: Patient declined   [2]   No current facility-administered  medications on file prior to encounter.     Current Outpatient Medications on File Prior to Encounter   Medication Sig Dispense Refill    acetaminophen (TYLENOL) 500 MG tablet Take 2 tablets (1,000 mg total) by mouth every 8 (eight) hours as needed for Pain.      dkrrigifz-flzahuhh-omoydlg ala (BIKTARVY) -25 mg (25 kg or greater) Take 1 tablet by mouth once daily. 30 tablet 5    folic acid (FOLVITE) 1 MG tablet Take 1 tablet (1 mg total) by mouth once daily. 30 tablet 0    ondansetron (ZOFRAN-ODT) 4 MG TbDL Take 2 tablets (8 mg total) by mouth every 12 (twelve) hours as needed (Nausea). 28 tablet 0    oxyCODONE (ROXICODONE) 5 MG immediate release tablet Take 1 tablet (5 mg total) by mouth every 8 (eight) hours as needed (Pain not controlled by acetaminophen). 21 tablet 0    pantoprazole (PROTONIX) 40 MG tablet Take 1 tablet (40 mg total) by mouth once daily. 30 tablet 0    SENNA 8.6 mg tablet Take 1 tablet by mouth once daily. 30 tablet 0    thiamine 100 MG tablet Take 1 tablet (100 mg total) by mouth once daily. 30 tablet 0

## 2025-05-06 NOTE — ASSESSMENT & PLAN NOTE
-Hb 12.6 on admit and after IV fluids is now 10.2  -No evidence of active bleeding thus far  -Continue to minotr and treat as above.

## 2025-05-07 VITALS
OXYGEN SATURATION: 98 % | BODY MASS INDEX: 24.34 KG/M2 | DIASTOLIC BLOOD PRESSURE: 67 MMHG | HEIGHT: 70 IN | TEMPERATURE: 98 F | RESPIRATION RATE: 16 BRPM | WEIGHT: 170 LBS | HEART RATE: 78 BPM | SYSTOLIC BLOOD PRESSURE: 117 MMHG

## 2025-05-07 PROBLEM — F10.10 ALCOHOL ABUSE: Status: ACTIVE | Noted: 2025-05-07

## 2025-05-07 LAB
ABSOLUTE EOSINOPHIL (OHS): 0.15 K/UL
ABSOLUTE MONOCYTE (OHS): 0.35 K/UL (ref 0.3–1)
ABSOLUTE NEUTROPHIL COUNT (OHS): 0.88 K/UL (ref 1.8–7.7)
ALBUMIN SERPL BCP-MCNC: 3.2 G/DL (ref 3.5–5.2)
ALP SERPL-CCNC: 83 UNIT/L (ref 40–150)
ALT SERPL W/O P-5'-P-CCNC: 47 UNIT/L (ref 10–44)
ANION GAP (OHS): 9 MMOL/L (ref 8–16)
ANISOCYTOSIS BLD QL SMEAR: SLIGHT
AST SERPL-CCNC: 41 UNIT/L (ref 11–45)
BASOPHILS # BLD AUTO: 0.02 K/UL
BASOPHILS NFR BLD AUTO: 0.6 %
BILIRUB SERPL-MCNC: 1 MG/DL (ref 0.1–1)
BUN SERPL-MCNC: 7 MG/DL (ref 6–20)
CALCIUM SERPL-MCNC: 8.5 MG/DL (ref 8.7–10.5)
CHLORIDE SERPL-SCNC: 107 MMOL/L (ref 95–110)
CO2 SERPL-SCNC: 21 MMOL/L (ref 23–29)
CREAT SERPL-MCNC: 0.7 MG/DL (ref 0.5–1.4)
ERYTHROCYTE [DISTWIDTH] IN BLOOD BY AUTOMATED COUNT: 19.7 % (ref 11.5–14.5)
GFR SERPLBLD CREATININE-BSD FMLA CKD-EPI: >60 ML/MIN/1.73/M2
GLUCOSE SERPL-MCNC: 97 MG/DL (ref 70–110)
HCT VFR BLD AUTO: 35.1 % (ref 40–54)
HGB BLD-MCNC: 10.6 GM/DL (ref 14–18)
HYPOCHROMIA BLD QL SMEAR: NORMAL
IMM GRANULOCYTES # BLD AUTO: 0.01 K/UL (ref 0–0.04)
IMM GRANULOCYTES NFR BLD AUTO: 0.3 % (ref 0–0.5)
LYMPHOCYTES # BLD AUTO: 1.81 K/UL (ref 1–4.8)
MAGNESIUM SERPL-MCNC: 1.8 MG/DL (ref 1.6–2.6)
MCH RBC QN AUTO: 24.5 PG (ref 27–31)
MCHC RBC AUTO-ENTMCNC: 30.2 G/DL (ref 32–36)
MCV RBC AUTO: 81 FL (ref 82–98)
NUCLEATED RBC (/100WBC) (OHS): 0 /100 WBC
PLATELET # BLD AUTO: 171 K/UL (ref 150–450)
PLATELET BLD QL SMEAR: NORMAL
PMV BLD AUTO: 10 FL (ref 9.2–12.9)
POTASSIUM SERPL-SCNC: 3.4 MMOL/L (ref 3.5–5.1)
PROT SERPL-MCNC: 6.1 GM/DL (ref 6–8.4)
RBC # BLD AUTO: 4.32 M/UL (ref 4.6–6.2)
RELATIVE EOSINOPHIL (OHS): 4.7 %
RELATIVE LYMPHOCYTE (OHS): 56.2 % (ref 18–48)
RELATIVE MONOCYTE (OHS): 10.9 % (ref 4–15)
RELATIVE NEUTROPHIL (OHS): 27.3 % (ref 38–73)
SODIUM SERPL-SCNC: 137 MMOL/L (ref 136–145)
WBC # BLD AUTO: 3.22 K/UL (ref 3.9–12.7)

## 2025-05-07 PROCEDURE — 80053 COMPREHEN METABOLIC PANEL: CPT | Performed by: PHYSICIAN ASSISTANT

## 2025-05-07 PROCEDURE — 63600175 PHARM REV CODE 636 W HCPCS: Performed by: HOSPITALIST

## 2025-05-07 PROCEDURE — 99232 SBSQ HOSP IP/OBS MODERATE 35: CPT | Mod: 95,AF,HB, | Performed by: STUDENT IN AN ORGANIZED HEALTH CARE EDUCATION/TRAINING PROGRAM

## 2025-05-07 PROCEDURE — G0378 HOSPITAL OBSERVATION PER HR: HCPCS

## 2025-05-07 PROCEDURE — 25000003 PHARM REV CODE 250: Performed by: HOSPITALIST

## 2025-05-07 PROCEDURE — 25000003 PHARM REV CODE 250: Performed by: PHYSICIAN ASSISTANT

## 2025-05-07 PROCEDURE — 36415 COLL VENOUS BLD VENIPUNCTURE: CPT | Performed by: HOSPITALIST

## 2025-05-07 PROCEDURE — 83735 ASSAY OF MAGNESIUM: CPT | Performed by: HOSPITALIST

## 2025-05-07 PROCEDURE — 63600175 PHARM REV CODE 636 W HCPCS: Performed by: PHYSICIAN ASSISTANT

## 2025-05-07 PROCEDURE — 94761 N-INVAS EAR/PLS OXIMETRY MLT: CPT

## 2025-05-07 PROCEDURE — 85025 COMPLETE CBC W/AUTO DIFF WBC: CPT | Performed by: HOSPITALIST

## 2025-05-07 RX ORDER — OXYCODONE AND ACETAMINOPHEN 5; 325 MG/1; MG/1
1 TABLET ORAL EVERY 8 HOURS PRN
Qty: 20 TABLET | Refills: 0 | Status: SHIPPED | OUTPATIENT
Start: 2025-05-07 | End: 2025-05-14

## 2025-05-07 RX ORDER — POTASSIUM CHLORIDE 750 MG/1
30 TABLET, EXTENDED RELEASE ORAL ONCE
Status: COMPLETED | OUTPATIENT
Start: 2025-05-07 | End: 2025-05-07

## 2025-05-07 RX ADMIN — MORPHINE SULFATE 2 MG: 2 INJECTION, SOLUTION INTRAMUSCULAR; INTRAVENOUS at 09:05

## 2025-05-07 RX ADMIN — FOLIC ACID 1 MG: 1 TABLET ORAL at 09:05

## 2025-05-07 RX ADMIN — POTASSIUM CHLORIDE 30 MEQ: 750 TABLET, EXTENDED RELEASE ORAL at 09:05

## 2025-05-07 RX ADMIN — OXYCODONE HYDROCHLORIDE AND ACETAMINOPHEN 1 TABLET: 5; 325 TABLET ORAL at 07:05

## 2025-05-07 RX ADMIN — MULTIVITAMIN TABLET 1 TABLET: TABLET at 09:05

## 2025-05-07 RX ADMIN — BICTEGRAVIR SODIUM, EMTRICITABINE, AND TENOFOVIR ALAFENAMIDE FUMARATE 1 TABLET: 50; 200; 25 TABLET ORAL at 09:05

## 2025-05-07 RX ADMIN — SODIUM CHLORIDE, POTASSIUM CHLORIDE, SODIUM LACTATE AND CALCIUM CHLORIDE: 600; 310; 30; 20 INJECTION, SOLUTION INTRAVENOUS at 02:05

## 2025-05-07 RX ADMIN — Medication 100 MG: at 09:05

## 2025-05-07 RX ADMIN — PANTOPRAZOLE SODIUM 40 MG: 40 INJECTION, POWDER, FOR SOLUTION INTRAVENOUS at 09:05

## 2025-05-07 RX ADMIN — MORPHINE SULFATE 2 MG: 2 INJECTION, SOLUTION INTRAMUSCULAR; INTRAVENOUS at 02:05

## 2025-05-07 NOTE — DISCHARGE SUMMARY
[facility] [address] Medicine  Discharge Summary      Patient Name: Tasia Cardona  MRN: [id number]  HEATHER: [id number]  Patient Class: IP- Inpatient  Admission Date: 5/6/2025  Hospital Length of Stay: 1 days  Discharge Date and Time: 5/7/2025  2:42 PM  Attending Physician: No att. providers found   Discharging Provider: Abbe Varghese MD  Primary Care Provider: Pina Jones NP    Primary Care Team: Networked reference to record PCT     HPI:   Mr. Tasia Cardona is a 25 y.o. male, with PMH of chronic alcohol-induced pancreatitis, prior necrotizing pancreatitis, alcohol use disorder, Corinne-hitchcock tear, splenic vein thrombosis, alcohol induced gastritis, HIV, asthma, anemia, polycythemia vera, who [place] [facility] [place] due to LUQ abdominal pain. He reports that he has been vomiting bright red blood beginning 5/4/25 and worsening throughout the day on 5/5/25. He states he can not tolerate PO intake. He denied BRBPR, melena, hematochezia. He notes associated light headedness. During his ED visit on 5/4/25 he was advised to remain for workup of upper GI bleeding, but declined admission due to need to arrange for pet care. In the ED today, he was evaluated with a CBC that showed no leukocytosis or left shift.  H&H were 12.2/39.8.  A metabolic panel was without acute findings, but did show , ALT 83.  Lipase was not elevated at 14.  He was treated in the ED with Compazine, Benadryl, Dilaudid. He was placed on observation.     Goals of Care Treatment Preferences:  Code Status: Full Code         Consults:   Consults (From admission, onward)          Status Ordering Provider     Inpatient consult to Gastroenterology  Once        Provider:  Subhash Javed II, MD    Completed [facility]     Inpatient consult to Telemedicine - Psych  Once        Provider:  Gibson River MD    Completed [facility]            Assessment &  "Plan  Hematemesis  Alcohol-induced chronic pancreatitis  Pancreatic pseudocyst/cyst  Alcohol abuse  -Placed in observation status  -Presented with nausea, vomiting and abdominal pain after drinking alcohol for his birthday.  Reports only small amount of bright red blood in his emesis  -Noted history of chronic pancreatitis.  Patient reports it was secondary to splenic vein thrombosis.  -Lipase is normal.  EtOH markedly elevated.  -CT Abdomen / Pelvis shows acute pancreatitis with similar peripancreatic fat stranding and small peripancreatic fluid collection, no worsening inflammatory changes, chronic splenic vein thrombosis with collateral vessels in the upper abdomen, as seen previously.  Hepatosplenomegaly and hepatic steatosis.  -Hb 12.2 on admit and now after IV fluids is stable at 10.6  -GI consulted and input appreciated.  Discussed case with Dr. Mcdaniels today.  -Patient advanced to regular diet which he tolerated without issue.  He has had no evidence of ongoing GI bleeding, nausea or vomiting.  Patient was seen and examined today.  He frequently is asking for IV dilaudid, but has absolutely no pain when I palpate his abdomen vigorously.  This could represent drug seeking behavior.  -He has been counseled on need to stop drinking all alcohol.  While he initially stated he had one cocktail 2 days prior to admit, he now admits he has attended AA meetings previously and has a very high tolerance for alcohol.  He states he has to drink "a lot" to get drunk.  I encouraged him to return to AA meetings and to abstain completely from alcohol consumption.  Discussed risks of cirrhosis and cancer.  -Patient is medically stable to go home and he is agreeable with this plan  -Follow up with pcp and GI in outpatient setting.  HIV infection  -CD4 10/12/23 was 1080  -Continue Biktarvy  Mild intermittent asthma without complication  -No symptoms of exacerbation at present  -Monitor and provide p.r.n. DuoNebs if " needed  Anemia  -Hb 12.6 on admit and after IV fluids is now 10.6  -No evidence of active bleeding during his stay  Final Active Diagnoses:    Diagnosis Date Noted POA    PRINCIPAL PROBLEM:  Hematemesis [K92.0] 09/17/2023 Yes    Anemia [D64.9] 05/06/2025 Unknown    Alcohol-induced chronic pancreatitis [K86.0] 12/15/2023 Yes    Pancreatic pseudocyst/cyst [K86.2, K86.3] 10/24/2023 Yes    Mild intermittent asthma without complication [J45.20] 09/18/2023 Yes     Chronic    HIV infection [Z21] 11/02/2021 Yes      Problems Resolved During this Admission:       Discharged Condition: stable    Disposition: Home or Self Care    Follow Up:   Follow-up Information       Locations, [facility]. Schedule an appointment as soon as possible for a visit.    Specialty: Gastroenterology  Why: Sent Referall, they shoudl call to set up an appointment. Please call within 1 week  Contact information:  [address] [facility] [facility]  [address]  [facility] [address]  [phone]                           Patient Instructions:      Ambulatory referral/consult to Gastroenterology   Standing Status: Future   Referral Priority: Routine Referral Type: Consultation   Referral Reason: Specialty Services Required   Requested Specialty: Gastroenterology   Number of Visits Requested: 1     Diet Cardiac     Notify your health care provider if you experience any of the following:  increased confusion or weakness     Notify your health care provider if you experience any of the following:  persistent dizziness, light-headedness, or visual disturbances     Notify your health care provider if you experience any of the following:  worsening rash     Notify your health care provider if you experience any of the following:  severe persistent headache     Notify your health care provider if you experience any of the following:  difficulty breathing or increased cough     Notify your health care provider if you experience any of  "the following:  severe uncontrolled pain     Notify your health care provider if you experience any of the following:  persistent nausea and vomiting or diarrhea     Notify your health care provider if you experience any of the following:  temperature >100.4     Activity as tolerated       Significant Diagnostic Studies: Labs: BMP:   Recent Labs   Lab 05/06/25  0207 05/06/25  0413 05/07/25  0431   GLU 91 86 97    143 137   K 3.8 3.5 3.4*    114* 107   CO2 21* 16* 21*   BUN 6 7 7   CREATININE 0.7 0.6 0.7   CALCIUM 8.5* 7.9* 8.5*   MG 2.5 2.2 1.8   , CMP   Recent Labs   Lab 05/06/25  0207 05/06/25  0413 05/07/25  0431    143 137   K 3.8 3.5 3.4*    114* 107   CO2 21* 16* 21*   GLU 91 86 97   BUN 6 7 7   CREATININE 0.7 0.6 0.7   CALCIUM 8.5* 7.9* 8.5*   PROT 8.0 6.9 6.1   ALBUMIN 4.0 3.4* 3.2*   BILITOT 0.3 0.3 1.0   ALKPHOS 104 97 83   * 105* 41   ALT 83* 71* 47*   ANIONGAP 12 13 9   , CBC   Recent Labs   Lab 05/06/25  1716 05/06/25  2347 05/07/25  0431   WBC 4.96 3.54* 3.22*   HGB 10.7* 10.3* 10.6*   HCT 35.0* 33.5* 35.1*    171 171   , INR   Lab Results   Component Value Date    INR 1.1 05/06/2025    INR 1.0 05/04/2025    INR 1.1 12/13/2023    PROTIME 12.0 05/06/2025    PROTIME 11.5 05/04/2025   , Lipid Panel   Lab Results   Component Value Date    CHOL 129 03/30/2025    HDL 39 (L) 03/30/2025    LDLCALC 74.8 03/30/2025    TRIG 76 03/30/2025    CHOLHDL 30.2 03/30/2025   , Troponin No results for input(s): "TROPONINI" in the last 168 hours., and A1C: No results for input(s): "HGBA1C" in the last 4320 hours.    Pending Diagnostic Studies:       Procedure Component Value Units Date/Time    Toxicology Screen, Urine [[id number]]     Order Status: Sent Lab Status: No result     Specimen: Urine            Medications:  Reconciled Home Medications:      Medication List        START taking these medications      oxyCODONE-acetaminophen 5-325 mg per tablet  Commonly known as: " PERCOCET  Take 1 tablet by mouth every 8 (eight) hours as needed for Pain.            CONTINUE taking these medications      acetaminophen 500 MG tablet  Commonly known as: TYLENOL  Take 2 tablets (1,000 mg total) by mouth every 8 (eight) hours as needed for Pain.     tuyaagnlb-whfwubax-qzhqwpb ala -25 mg (25 kg or greater)  Commonly known as: BIKTARVY  Take 1 tablet by mouth once daily.     folic acid 1 MG tablet  Commonly known as: FOLVITE  Take 1 tablet (1 mg total) by mouth once daily.     ondansetron 4 MG Tbdl  Commonly known as: ZOFRAN-ODT  Take 2 tablets (8 mg total) by mouth every 12 (twelve) hours as needed (Nausea).     pantoprazole 40 MG tablet  Commonly known as: PROTONIX  Take 1 tablet (40 mg total) by mouth once daily.     senna 8.6 mg tablet  Commonly known as: SENNA  Take 1 tablet by mouth once daily.     thiamine 100 MG tablet  Take 1 tablet (100 mg total) by mouth once daily.            STOP taking these medications      oxyCODONE 5 MG immediate release tablet  Commonly known as: ROXICODONE              Indwelling Lines/Drains at time of discharge:   Lines/Drains/Airways       None                   Time spent on the discharge of patient: 35 minutes         Abbe Varghese MD  Department of Hospital Medicine  [facility] ([address])

## 2025-05-07 NOTE — PROGRESS NOTES
6:55 AM    Psych Consult called per [facility]. Was told to call back related to shift change.

## 2025-05-07 NOTE — ASSESSMENT & PLAN NOTE
-Hb 12.6 on admit and after IV fluids is now 10.6  -No evidence of active bleeding during his stay

## 2025-05-07 NOTE — ASSESSMENT & PLAN NOTE
"-Placed in observation status  -Presented with nausea, vomiting and abdominal pain after drinking alcohol for his birthday.  Reports only small amount of bright red blood in his emesis  -Noted history of chronic pancreatitis.  Patient reports it was secondary to splenic vein thrombosis.  -Lipase is normal.  EtOH markedly elevated.  -CT Abdomen / Pelvis shows acute pancreatitis with similar peripancreatic fat stranding and small peripancreatic fluid collection, no worsening inflammatory changes, chronic splenic vein thrombosis with collateral vessels in the upper abdomen, as seen previously.  Hepatosplenomegaly and hepatic steatosis.  -Hb 12.2 on admit and now after IV fluids is stable at 10.6  -GI consulted and input appreciated.  Discussed case with Dr. Mcdaniels today.  -Patient advanced to regular diet which he tolerated without issue.  He has had no evidence of ongoing GI bleeding, nausea or vomiting.  Patient was seen and examined today.  He frequently is asking for IV dilaudid, but has absolutely no pain when I palpate his abdomen vigorously.  This could represent drug seeking behavior.  -He has been counseled on need to stop drinking all alcohol.  While he initially stated he had one cocktail 2 days prior to admit, he now admits he has attended AA meetings previously and has a very high tolerance for alcohol.  He states he has to drink "a lot" to get drunk.  I encouraged him to return to AA meetings and to abstain completely from alcohol consumption.  Discussed risks of cirrhosis and cancer.  -Patient is medically stable to go home and he is agreeable with this plan  -Follow up with pcp and GI in outpatient setting.  "

## 2025-05-07 NOTE — PLAN OF CARE
"PING [place] [facility] Clinic will reach out to patient to schedule an appointment.    Your fax has been successfully sent to [phone] at [phone].  ------------------------------------------------------------  From: [id number]  ------------------------------------------------------------  5/7/2025 1:18:04 PM Transmission Record          Sent to [phone] with remote ID "          Result: (4/3;0/0) Line broken (no loop current)          Page record: NONE SENT          Elapsed time: 00:03 on channel 24    5/7/2025 1:23:12 PM Transmission Record          Sent to [phone] with remote ID "[id number]      "          Result: (0/339;0/0) Success          Page record: 1 - 4          Elapsed time: 01:20 on channel 22   "

## 2025-05-07 NOTE — CONSULTS
[facility]   DEPARTMENT OF PSYCHIATRY    SERVICE: Telepsychiatry  ENCOUNTER: initial    CHIEF COMPLAINT: drug seeking behavior    TELEPSYCHIATRY (AUDIOVISUAL): Each patient who is provided psychiatric services via telehealth is: (1) informed of the relationship between the psychiatric provider and patient, as well as the respective role of any other health care staff/providers with respect to management of the patient; and (2) notified that he or she may decline to receive psychiatric services by telehealth and may withdraw from such care at any time.  Risks of telehealth include the potential for security breaches (HIPPA compliant platforms notwithstanding) and technological failure, as well as the limitations to physical examination inherent to the modality. The patient was agreeable to the use of telehealth services.    START TIME: 5/7/2025 8:15 AM  STOP TIME: 5/7/2025 9:18 AM  TOTAL ENCOUNTER TIME: see above start/stop times    -- PATIENT IDENTIFIERS: Tasia Cardona  [id number]  2000  25 y.o.  male  -- REQUESTING PROVIDER: Abbe Varghese MD *  -- LOCATION OF PATIENT: unit (med/surg)    -- MODE OF ARRIVAL: self-presented  -- PRESENT WITH PATIENT DURING EVALUATION: RN at bedside initially.  -- SOURCES OF INFORMATION: PATIENT, staff, provider(s), EHR/chart  -- LOCATION OF ENCOUNTER PROVIDER: [facility]  -- ENCOUNTER PROVIDER: Gibson River MD      Subjective:     History of Present Illness:  PDMP reviewed:      In addition to the above, he has been prescribed Suboxone in 2023.  Otherwise it is short, limited supplies of opioids.  There is no prior diagnosis of opioid use disorder in his records.  He is opioid dependent.  Has a history of alcohol use disorder.  Urine drug screens in the past have been positive for THC, opiates as expected, cocaine in March 2024.    Patient seen at bedside.  He was calm, cooperative, comfortable appearing sitting up in bed in no acute distress.  Was unaware of  "psychiatry consult.  I informed the patient of the indication for my evaluation.  He said that pancreatitis episodes started a few years ago and he believes it is due to a splenic vein thrombosis as he has polycythemia vera.  He said that he has not noticed any triggers for these episodes.  He said that he does not take opioid pain medication on a regular basis, and once he is discharged from the hospital he gets the medication filled but does not take them regularly.  He said he has a few bottles of medications from discharges in his night stand drawer.  Denies a history of opioid withdrawal.  Does not feel like opioids have caused any problems in his social, work life.  He said that he suspects that he is being labeled as an opioid abuser due to requesting hydromorphone. He said he requests this medication as he knows it is what it works now. Also reports adverse effects from other opioids such as morphine causing rashes.     Denied depressed mood, hopelessness, suicidal ideation, homicidal ideation, paranoia, ideas of reference, auditory and visual hallucinations.  No diagnosed history of psychosis or ling.    When I initially asked him about his alcohol use, he said he drinks "occasionally" and "not much".  After I completed my evaluation, I reviewed his records again.  He has had PETH levels >600 recently and other times >1000.  He frequently presents to the emergency room with an elevated alcohol level.  I reconnected to the cart and asked him more about his alcohol use.  He then said that he drinks at least 3 to 4 days per week multiple shots of liquor.  I told him about the lab results I reviewed and encouraged him to cut back due to long term deleterious health effects.    DSM-5 OPIOID USE DISORDER CRITERIA   Mild (1-3), Moderate (4-5), Severe (>=6)  [] Often take in larger amounts or over a longer period of time than was intended.  [] Persistent desire or unsuccessful efforts to cut down or control " use.  [] Great deal of time spent in activities necessary to obtain substance, use, or recover from effects.  [] Craving/strong desire for substance or urge to use.  [] Use resulting in failure to fulfill major role obligations at home, work or school.  [] Social, occupational, recreational activities decreased because of use.  [] Continued use despite having persistent or recurrent social or interpersonal problems cause or exacerbated by the substance.  [] Recurrent use in situations in which it is physically hazardous.  [] Use despite physical or psychological problems that are likely to have been caused or exacerbated by the substance.  [x] Tolerance, as defined by either of the following:  A need for markedly increased amounts of substance to achieve intoxication or desired effect.  -OR-   A markedly diminished effect with continued use of the same amount of substance.  [] Withdrawal, as manifested by the following:  The characteristic withdrawal syndrome for substance.  -AND-  Substance is taken to relieve or avoid withdrawal symptoms.    ARE THE CRITERIA MET FOR DSM-5 OPIOID USE DISORDER: No  ACCURACY OF REPORTING: Current clinical impression is that patient appears to be minimizing    Psychiatric History:   Previous Psychiatric Hospitalizations: No   Previous Medication Trials: No  Previous Suicide Attempts: no  History of Violence: no  History of Depression: no  History of Bridget: no  History of Auditory/Visual Hallucination: no  History of Delusions: no  Outpatient psychiatrist (current & past): No    Substance Abuse History:  Tobacco: occasionally, about 1 cigarette per week.  Alcohol: occasionally, said maybe in college he drank more.   Illicit Substances:No  Detox/Rehab: No    Legal History: Past charges/incarcerations: No     Family Psychiatric History:   NO known family history of mental illness    Social History:  Developmental/Childhood:Achieved all developmental milestones  "timely  *Education:Bachelor's in marketing and international business  Employment Status/Finances:  for years.   Relationship Status/Sexual Orientation: single  Children: 0  Housing Status: Home    history:  NO  Access to gun: NO  Anabaptism:not asked   Recreational activities:watch movies, hang out with friends and dog, swim    [facility] Toolkit ASQ Suicide Screening Tool:  In the past few weeks, have you wished you were dead? No  In the past few weeks, have you felt that you or your family would be better off if you were dead? No  In the past week, have you been having thoughts about killing yourself? No  Have you ever tried to kill yourself? No  Are you having thoughts of killing yourself right now? No    Psychiatric Mental Status Exam:  Arousal: alert  Sensorium/Orientation: oriented to grossly intact  Behavior/Cooperation: friendly and cooperative   Speech: normal tone, normal rate, normal pitch, normal volume  Language: grossly intact  Mood: " Good "   Affect: appropriate  Thought Process: normal and logical  Thought Content:   Auditory hallucinations: NO  Visual hallucinations: NO  Paranoia: NO  Delusions:  NO  Suicidal ideation: NO  Homicidal ideation: NO  Attention/Concentration:  intact  Memory:  Grossly intact to conversation  Fund of Knowledge: Vocabulary appropriate    Abstract reasoning:  Intact to conversation  Insight: has awareness of illness  Judgment: behavior is adequate to circumstances      Past Medical History:   Past Medical History:   Diagnosis Date    Acute necrotizing pancreatitis 09/20/2023    Alcohol use disorder 10/05/2023    Asthma     Chronic pancreatitis 09/20/2023    Hepatitis C antibody positive in blood 09/18/2023 9/2023 PCR negative    HIV infection 10/2021    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/2023    Normocytic anemia 09/18/2023    Pancreatic pseudocyst/cyst 10/24/2023    Polycythemia vera 11/28/2021    Receives phlebotomy if Hct>45    Positive CMV IgG serology " 09/21/2023    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      Laboratory Data:   Labs Reviewed   COMPREHENSIVE METABOLIC PANEL - Abnormal       Result Value    Sodium 142      Potassium 3.8      Chloride 109      CO2 21 (*)     Glucose 91      BUN 6      Creatinine 0.7      Calcium 8.5 (*)     Protein Total 8.0      Albumin 4.0      Bilirubin Total 0.3             (*)     ALT 83 (*)     Anion Gap 12      eGFR >60     CBC WITH DIFFERENTIAL - Abnormal    WBC 4.77      RBC 4.94      HGB 12.2 (*)     HCT 39.8 (*)     MCV 81 (*)     MCH 24.7 (*)     MCHC 30.7 (*)     RDW 20.0 (*)     Platelet Count 220      MPV 9.9      Nucleated RBC 0      Neut % 29.6 (*)     Lymph % 56.4 (*)     Mono % 10.1      Eos % 3.1      Basophil % 0.8      Imm Grans % 0.0      Neut # 1.41 (*)     Lymph # 2.69      Mono # 0.48      Eos # 0.15      Baso # 0.04      Imm Grans # 0.00     COMPREHENSIVE METABOLIC PANEL - Abnormal    Sodium 143      Potassium 3.5      Chloride 114 (*)     CO2 16 (*)     Glucose 86      BUN 7      Creatinine 0.6      Calcium 7.9 (*)     Protein Total 6.9      Albumin 3.4 (*)     Bilirubin Total 0.3      ALP 97       (*)     ALT 71 (*)     Anion Gap 13      eGFR >60     ALCOHOL,MEDICAL (ETHANOL) - Abnormal    Alcohol, Serum 183 (*)    CBC WITH DIFFERENTIAL - Abnormal    WBC 4.31      RBC 4.05 (*)     HGB 10.2 (*)     HCT 32.9 (*)     MCV 81 (*)     MCH 25.2 (*)     MCHC 31.0 (*)     RDW 19.7 (*)     Platelet Count 168      MPV 9.5      Nucleated RBC 0      Neut % 23.6 (*)     Lymph % 58.7 (*)     Mono % 11.6      Eos % 3.5      Basophil % 1.4      Imm Grans % 1.2 (*)     Neut # 1.02 (*)     Lymph # 2.53      Mono # 0.50      Eos # 0.15      Baso # 0.06      Imm Grans # 0.05 (*)    LIPASE - Normal    Lipase Level 14     MAGNESIUM - Normal    Magnesium  2.5     MAGNESIUM - Normal    Magnesium  2.2     PROTIME-INR - Normal    PT 12.0      INR 1.1     CBC W/ AUTO DIFFERENTIAL    Narrative:     The following  orders were created for panel order CBC auto differential.  Procedure                               Abnormality         Status                     ---------                               -----------         ------                     CBC with Differential[[id number]]       Abnormal            Final result                 Please view results for these tests on the individual orders.   EXTRA TUBES    Narrative:     The following orders were created for panel order EXTRA TUBES.  Procedure                               Abnormality         Status                     ---------                               -----------         ------                     Lavender Top Hold[[id number]]                               Final result                 Please view results for these tests on the individual orders.   LAVENDER TOP HOLD    Extra Tube Hold for add-ons.     CBC W/ AUTO DIFFERENTIAL    Narrative:     The following orders were created for panel order CBC auto differential.  Procedure                               Abnormality         Status                     ---------                               -----------         ------                     CBC with Differential[[id number]]       Abnormal            Final result                 Please view results for these tests on the individual orders.   TOXICOLOGY SCREEN, URINE, RANDOM (COMPLIANCE)   TYPE & SCREEN    Specimen Outdate 05/09/2025 23:59      Group & Rh AB POS      Indirect [place] NEG         Neurological History:  Seizures: No  Head trauma: No    Allergies:   Review of patient's allergies indicates:   Allergen Reactions    [place] Swelling    Droperidol Hives    Pecan nut Swelling    Penicillins Hives     Tolerates cephalosporins    Tolerated piperacillin/tazobactam 11/2023    Sulfa (sulfonamide antibiotics) Hives    Tolonium chloride(toluidine blue-o) Hives, Itching and Rash    Toradol [ketorolac] Rash       Medications in ER:   Medications   sodium chloride 0.9% flush  10 mL (10 mLs Intravenous Not Given 5/7/25 0600)   melatonin tablet 6 mg (has no administration in time range)   senna-docusate 8.6-50 mg per tablet 1 tablet (has no administration in time range)   acetaminophen tablet 650 mg (has no administration in time range)   naloxone 0.4 mg/mL injection 0.02 mg (has no administration in time range)   glucose chewable tablet 16 g (has no administration in time range)   glucose chewable tablet 24 g (has no administration in time range)   dextrose 50% injection 12.5 g (has no administration in time range)   dextrose 50% injection 25 g (has no administration in time range)   glucagon (human recombinant) injection 1 mg (has no administration in time range)   diazePAM tablet 5 mg (has no administration in time range)   multivitamin tablet (1 tablet Oral Given 5/6/25 0810)   folic acid tablet 1 mg (1 mg Oral Given 5/6/25 0810)   thiamine tablet 100 mg (100 mg Oral Given 5/6/25 0810)   lactated ringers infusion ( Intravenous New Bag 5/7/25 0233)   pantoprazole injection 40 mg (40 mg Intravenous Given 5/6/25 2025)   promethazine (PHENERGAN) 25 mg in 0.9% NaCl 50 mL IVPB (has no administration in time range)   prochlorperazine injection Soln 10 mg (has no administration in time range)     And   diphenhydrAMINE injection 25 mg (has no administration in time range)   dicyclomine injection 20 mg (has no administration in time range)   psozwcpdf-axeprmxr-uygdyip ala -25 mg (25 kg or greater) 1 tablet (1 tablet Oral Given 5/6/25 0810)   oxyCODONE-acetaminophen 5-325 mg per tablet 1 tablet (1 tablet Oral Given 5/7/25 0715)   morphine injection 2 mg (2 mg Intravenous Given 5/7/25 0228)   0.9% NaCl infusion (0 mLs Intravenous Stopped 5/6/25 0319)   prochlorperazine injection Soln 10 mg (10 mg Intravenous Given 5/6/25 0242)   diphenhydrAMINE injection 12.5 mg (12.5 mg Intravenous Given 5/6/25 0233)   HYDROmorphone injection 1 mg (1 mg Intravenous Given 5/6/25 0235)   pantoprazole  injection 40 mg (40 mg Intravenous Given 5/6/25 0235)   0.9% NaCl 1,000 mL with mvi, (ADULT) no.4 with vit K 3,300 unit- 150 mcg/10 mL 10 mL, thiamine 100 mg, folic acid 1 mg infusion ( Intravenous Stopped 5/6/25 1012)   morphine injection 2 mg (2 mg Intravenous Given 5/6/25 0542)     And   diphenhydrAMINE injection 6.25 mg (6.25 mg Intravenous Given 5/6/25 0540)   iohexoL (OMNIPAQUE 350) injection 75 mL (75 mLs Intravenous Given 5/6/25 0843)       Medications at home: none      Assessment - Diagnosis - Goals:     Diagnosis/Impression:   Alcohol use disorder, moderate to severe  Opioid dependence, r/o use disorder  Alcohol-induced pancreatitis and gastritis    Rec:   - suspect patient is minimizing symptoms and substance use disorder criteria as he was not forthcoming about his alcohol use.  However, I am unable to diagnosed him with an opioid use disorder today as he does not meet criteria and I am unable to gather enough criteria besides tolerance from the medical record.  - he does appear comfortable in bed in no acute distress and is laughing during my evaluation.  As long as the primary team feels that his physical exam and labs are benign, I would agree with deescalating opioids for pain.  - counseled on substance abuse, encouraged cutting back on alcohol use.  - no indication for pec  - would treat empirically with thiamine and folic acid supplementation due to alcohol use.    Plan of Care communicated to: Dr. Halima River MD   Psychiatry  [facility]    Inpatient consult to Telemedicine - Psych  Consult performed by: Gibson River MD  Consult ordered by: Jennyfer Mckinley PA-C  Reason for consult: drug-seeking behavior?        [facility] LOCATION ([facility]) [facility] MEDICAL SURGICAL CLAR*

## 2025-05-07 NOTE — PROGRESS NOTES
[facility] ([address])  Gastroenterology  Progress Note    Patient Name: Tasia Cardona  MRN: [id number]  Admission Date: 5/6/2025  Hospital Length of Stay: 1 days  Code Status: Full Code   Attending Provider: Abbe Varghese MD  Primary Care Physician: Pina Jones NP  Principal Problem: Hematemesis    Subjective:     CC= pancreatitis    Interval History:  The patient reports continued epigastric pain.  He says morphine is not controlling the pain well enough and would prefer Dilaudid.  He is tolerating his clear liquid diet and asking for a regular diet.    Review of systems:  General: Negative for fevers, chills.  Cardiovascular:  Negative for chest pain, shortness of breath     Objective:     Vital Signs (Most Recent):  Temp: 98.1 °F (36.7 °C) (05/07/25 0400)  Pulse: 69 (05/07/25 0400)  Resp: 18 (05/07/25 0715)  BP: 123/80 (05/07/25 0400)  SpO2: 100 % (05/07/25 0400) Vital Signs (24h Range):  Temp:  [98 °F (36.7 °C)-98.4 °F (36.9 °C)] 98.1 °F (36.7 °C)  Pulse:  [] 69  Resp:  [17-18] 18  SpO2:  [97 %-100 %] 100 %  BP: ()/(50-80) 123/80     Physical examination:  GEN: Well developed, well nourished in no apparent distress   HENT: Normocephalic, anicteric sclera   Cardiovascular: Regular rate and rhythm. No murmurs appreciated.   Chest: Non-labored respirations. Breath sounds equal   Abdomen: Soft, mild epigastric tenderness, normoactive BS  Psych: Appropriate mood and affect.       CBC:   Recent Labs   Lab 05/06/25  1716 05/06/25  2347 05/07/25  0431   WBC 4.96 3.54* 3.22*   HGB 10.7* 10.3* 10.6*   HCT 35.0* 33.5* 35.1*    171 171     CMP:   Recent Labs   Lab 05/07/25  0431   GLU 97   CALCIUM 8.5*   ALBUMIN 3.2*   PROT 6.1      K 3.4*   CO2 21*      BUN 7   CREATININE 0.7   ALKPHOS 83   ALT 47*   AST 41   BILITOT 1.0           Assessment:   Pt. Is a 25 y.o. male with acute on chronic pancreatitis, hematemesis with stable anemia and multiple medical issues.  He is  complaining of continued abdominal pain, but his exam is fairly benign.  We will advance diet.      Plan:   1. Advance to regular diet   2. Defer pain control to hospitalist  3. MVI, folate and thiamine replacement  4. Continue PPI  5. Avoid alcohol       Kg Mcdaniels MD  Gastroenterology  [place]  Med Surg ([address])

## 2025-05-07 NOTE — PLAN OF CARE
Case Management Final Discharge Note    Discharge Disposition: home    New DME ordered / company name: none    Relevant SDOH / Transition of Care Barriers:  substance abuse (alcohol )    Person available to provide assistance at home when needed and their contact information: none    Scheduled followup appointment: PCP     Referrals placed: [facility]    Transportation: patient drive himself home        Patient educated on discharge services and updated on DC plan. Bedside RN notified, patient clear to discharge from Case Management Perspective.     05/07/25 1341   Final Note   Assessment Type Discharge Planning Assessment   Anticipated Discharge Disposition Home   What phone number can be called within the next 1-3 days to see how you are doing after discharge?   ([phone])   Hospital Resources/Appts/Education Provided Provided patient/caregiver with written discharge plan information;Community resources provided  (resources given for substance abuse (alchol))   Post-Acute Status   Discharge Delays None known at this time

## 2025-05-07 NOTE — DISCHARGE INSTRUCTIONS
Take all medications as prescribed.  Eat a strict low fat cardiac diet.  DO NOT DRINK ANY ALCOHOL (NO BEER, NO LIQUOR, NO COCKTAILS)  Go to Alcoholics Anonymous.  Follow up with your physicians as scheduled - pcp within 1 week.  Gastroenterology within 2 weeks.  Dr. Velasco within 1 month.  Thank you for trusting [facility] and Dr. Varghese with your care.  We are honored that you entrusted us with your healthcare needs. Your satisfaction is very important to us and we hope you have been very pleased with your experience at [facility]. After your discharge you may receive a survey asking you to rate your hospital experience. We encourage you to take the time to complete the survey as your feedback allows us to identify areas for improvement as well as recognize our staff.   We hope that you have received the very best care possible during your hospitalization at [facility], as your satisfaction is our top priority.

## 2025-05-07 NOTE — PLAN OF CARE
Free from falls, injury, or skin breakdown this hospital admission. Pt eager & in agreement w/ DC. VU of DC instructions--paperwork passed & explained--script filled and delivered to bedside. IV removed w/ cath tip intact, WNL. Voiding, ambulating, & tolerating PO well. To be DCd home--will be escorted downstairs via  transport team once dressed and ready.

## 2025-05-19 ENCOUNTER — HOSPITAL ENCOUNTER (EMERGENCY)
Facility: OTHER | Age: 25
Discharge: HOME | End: 2025-05-19
Payer: MEDICAID

## 2025-05-19 VITALS
WEIGHT: 160 LBS | OXYGEN SATURATION: 98 % | TEMPERATURE: 98 F | RESPIRATION RATE: 18 BRPM | HEART RATE: 58 BPM | BODY MASS INDEX: 22.9 KG/M2 | DIASTOLIC BLOOD PRESSURE: 70 MMHG | HEIGHT: 70 IN | SYSTOLIC BLOOD PRESSURE: 118 MMHG

## 2025-05-19 DIAGNOSIS — K85.90 ACUTE ON CHRONIC PANCREATITIS: ICD-10-CM

## 2025-05-19 DIAGNOSIS — K92.0 HEMATEMESIS WITH NAUSEA: ICD-10-CM

## 2025-05-19 DIAGNOSIS — K86.1 ACUTE ON CHRONIC PANCREATITIS: ICD-10-CM

## 2025-05-19 DIAGNOSIS — F10.10 ALCOHOL ABUSE: Primary | ICD-10-CM

## 2025-05-19 DIAGNOSIS — K22.6 MALLORY-WEISS SYNDROME: ICD-10-CM

## 2025-05-19 LAB
ABSOLUTE EOSINOPHIL (OHS): 0.15 K/UL
ABSOLUTE MONOCYTE (OHS): 0.87 K/UL (ref 0.3–1)
ABSOLUTE NEUTROPHIL COUNT (OHS): 3.26 K/UL (ref 1.8–7.7)
ALBUMIN SERPL BCP-MCNC: 4 G/DL (ref 3.5–5.2)
ALP SERPL-CCNC: 96 UNIT/L (ref 40–150)
ALT SERPL W/O P-5'-P-CCNC: 43 UNIT/L (ref 10–44)
ANION GAP (OHS): 9 MMOL/L (ref 8–16)
AST SERPL-CCNC: 51 UNIT/L (ref 11–45)
BASOPHILS # BLD AUTO: 0.04 K/UL
BASOPHILS NFR BLD AUTO: 0.6 %
BILIRUB SERPL-MCNC: 0.5 MG/DL (ref 0.1–1)
BUN SERPL-MCNC: 9 MG/DL (ref 6–20)
CALCIUM SERPL-MCNC: 8.7 MG/DL (ref 8.7–10.5)
CHLORIDE SERPL-SCNC: 106 MMOL/L (ref 95–110)
CO2 SERPL-SCNC: 23 MMOL/L (ref 23–29)
CREAT SERPL-MCNC: 0.7 MG/DL (ref 0.5–1.4)
ERYTHROCYTE [DISTWIDTH] IN BLOOD BY AUTOMATED COUNT: 21.6 % (ref 11.5–14.5)
GFR SERPLBLD CREATININE-BSD FMLA CKD-EPI: >60 ML/MIN/1.73/M2
GLUCOSE SERPL-MCNC: 110 MG/DL (ref 70–110)
HCT VFR BLD AUTO: 42.4 % (ref 40–54)
HGB BLD-MCNC: 12.8 GM/DL (ref 14–18)
IMM GRANULOCYTES # BLD AUTO: 0.02 K/UL (ref 0–0.04)
IMM GRANULOCYTES NFR BLD AUTO: 0.3 % (ref 0–0.5)
LIPASE SERPL-CCNC: 16 U/L (ref 4–60)
LYMPHOCYTES # BLD AUTO: 2.32 K/UL (ref 1–4.8)
MCH RBC QN AUTO: 25.5 PG (ref 27–31)
MCHC RBC AUTO-ENTMCNC: 30.2 G/DL (ref 32–36)
MCV RBC AUTO: 85 FL (ref 82–98)
NUCLEATED RBC (/100WBC) (OHS): 0 /100 WBC
PLATELET # BLD AUTO: 206 K/UL (ref 150–450)
PMV BLD AUTO: 9.4 FL (ref 9.2–12.9)
POTASSIUM SERPL-SCNC: 3.5 MMOL/L (ref 3.5–5.1)
PROT SERPL-MCNC: 7.4 GM/DL (ref 6–8.4)
RBC # BLD AUTO: 5.02 M/UL (ref 4.6–6.2)
RELATIVE EOSINOPHIL (OHS): 2.3 %
RELATIVE LYMPHOCYTE (OHS): 34.8 % (ref 18–48)
RELATIVE MONOCYTE (OHS): 13.1 % (ref 4–15)
RELATIVE NEUTROPHIL (OHS): 48.9 % (ref 38–73)
SODIUM SERPL-SCNC: 138 MMOL/L (ref 136–145)
WBC # BLD AUTO: 6.66 K/UL (ref 3.9–12.7)

## 2025-05-19 PROCEDURE — 96375 TX/PRO/DX INJ NEW DRUG ADDON: CPT

## 2025-05-19 PROCEDURE — 85025 COMPLETE CBC W/AUTO DIFF WBC: CPT | Performed by: PHYSICIAN ASSISTANT

## 2025-05-19 PROCEDURE — 25000003 PHARM REV CODE 250: Performed by: PHYSICIAN ASSISTANT

## 2025-05-19 PROCEDURE — 96361 HYDRATE IV INFUSION ADD-ON: CPT

## 2025-05-19 PROCEDURE — 96376 TX/PRO/DX INJ SAME DRUG ADON: CPT

## 2025-05-19 PROCEDURE — 96374 THER/PROPH/DIAG INJ IV PUSH: CPT

## 2025-05-19 PROCEDURE — 99284 EMERGENCY DEPT VISIT MOD MDM: CPT | Mod: 25

## 2025-05-19 PROCEDURE — 63600175 PHARM REV CODE 636 W HCPCS

## 2025-05-19 PROCEDURE — 82040 ASSAY OF SERUM ALBUMIN: CPT

## 2025-05-19 PROCEDURE — 83690 ASSAY OF LIPASE: CPT | Performed by: PHYSICIAN ASSISTANT

## 2025-05-19 RX ORDER — MORPHINE SULFATE 4 MG/ML
4 INJECTION, SOLUTION INTRAMUSCULAR; INTRAVENOUS
Refills: 0 | Status: COMPLETED | OUTPATIENT
Start: 2025-05-19 | End: 2025-05-19

## 2025-05-19 RX ORDER — METOCLOPRAMIDE HYDROCHLORIDE 5 MG/ML
10 INJECTION INTRAMUSCULAR; INTRAVENOUS
Status: COMPLETED | OUTPATIENT
Start: 2025-05-19 | End: 2025-05-19

## 2025-05-19 RX ORDER — ONDANSETRON HYDROCHLORIDE 2 MG/ML
4 INJECTION, SOLUTION INTRAVENOUS
Status: DISCONTINUED | OUTPATIENT
Start: 2025-05-19 | End: 2025-05-19

## 2025-05-19 RX ADMIN — SODIUM CHLORIDE 1000 ML: 9 INJECTION, SOLUTION INTRAVENOUS at 12:05

## 2025-05-19 RX ADMIN — MORPHINE SULFATE 4 MG: 4 INJECTION INTRAVENOUS at 02:05

## 2025-05-19 RX ADMIN — METOCLOPRAMIDE 10 MG: 5 INJECTION, SOLUTION INTRAMUSCULAR; INTRAVENOUS at 12:05

## 2025-05-19 RX ADMIN — MORPHINE SULFATE 4 MG: 4 INJECTION INTRAVENOUS at 12:05

## 2025-05-19 NOTE — DISCHARGE INSTRUCTIONS
Diagnosis:  Pancreatitis    Tests today showed:   Labs Reviewed   CBC WITH DIFFERENTIAL - Abnormal       Result Value    WBC 6.66      RBC 5.02      HGB 12.8 (*)     HCT 42.4      MCV 85      MCH 25.5 (*)     MCHC 30.2 (*)     RDW 21.6 (*)     Platelet Count 206      MPV 9.4      Nucleated RBC 0      Neut % 48.9      Lymph % 34.8      Mono % 13.1      Eos % 2.3      Basophil % 0.6      Imm Grans % 0.3      Neut # 3.26      Lymph # 2.32      Mono # 0.87      Eos # 0.15      Baso # 0.04      Imm Grans # 0.02     COMPREHENSIVE METABOLIC PANEL - Abnormal    Sodium 138      Potassium 3.5      Chloride 106      CO2 23      Glucose 110      BUN 9      Creatinine 0.7      Calcium 8.7      Protein Total 7.4      Albumin 4.0      Bilirubin Total 0.5      ALP 96      AST 51 (*)     ALT 43      Anion Gap 9      eGFR >60     LIPASE - Normal    Lipase Level 16     CBC W/ AUTO DIFFERENTIAL    Narrative:     The following orders were created for panel order CBC W/ AUTO DIFFERENTIAL.  Procedure                               Abnormality         Status                     ---------                               -----------         ------                     CBC with Differential[[id number]]       Abnormal            Final result                 Please view results for these tests on the individual orders.   COMPREHENSIVE METABOLIC PANEL   URINALYSIS, REFLEX TO URINE CULTURE     No orders to display       Treatments you had today:   Medications   sodium chloride 0.9% bolus 1,000 mL 1,000 mL (0 mLs Intravenous Stopped 5/19/25 1338)   morphine injection 4 mg (4 mg Intravenous Given 5/19/25 1240)   metoclopramide injection 10 mg (10 mg Intravenous Given 5/19/25 1239)   morphine injection 4 mg (4 mg Intravenous Given 5/19/25 1403)       Follow-Up Plan:  - Follow-up with primary care doctor within 3 - 5 days  - Additional testing and/or evaluation as directed by your primary doctor    Return to the Emergency Department for symptoms  including but not limited to: worsening symptoms, shortness of breath or chest pain, vomiting with inability to hold down fluids, fevers greater than 100.4°F, passing out/fainting/unconsciousness, or other concerning symptoms.

## 2025-05-19 NOTE — ED NOTES
Bed: 17 REC  Expected date:   Expected time:   Means of arrival:   Comments:  Tasia espinal when cleaned

## 2025-05-19 NOTE — ED PROVIDER NOTES
Encounter Date: 5/19/2025       History     Chief Complaint   Patient presents with    Influenza    Abdominal Pain     Per pt he think he is having a pancreatitis flare. Reports hx of alcohol induced pancreatitis and reports alcohol intake on yesterday. Stated he is now having abd pain N/V      25 y.o. male, with PMH of chronic alcohol-induced pancreatitis, prior necrotizing pancreatitis, alcohol use disorder, Corinne-chauhan tear, splenic vein thrombosis, alcohol induced gastritis, HIV, asthma, anemia, polycythemia vera, who presented to the emergency department complaining of a pancreatitis flare.  States symptoms started this morning.  He reports associated nausea with blood tinged emesis.  No diarrhea, fevers, headache, chest pain, shortness of breath.  Admits to drinking alcohol last night.  Last meal was last night.  Symptoms feel similar to his chronic pancreatitis.  Has not been able to tolerate medications today.    The history is provided by the patient. No  was used.     Review of patient's allergies indicates:   Allergen Reactions    [place] Swelling    Droperidol Hives    Pecan nut Swelling    Penicillins Hives     Tolerates cephalosporins    Tolerated piperacillin/tazobactam 11/2023    Sulfa (sulfonamide antibiotics) Hives    Tolonium chloride(toluidine blue-o) Hives, Itching and Rash    Toradol [ketorolac] Rash     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     Past Surgical History:   Procedure Laterality Date     ENDOSCOPIC ULTRASOUND OF UPPER GASTROINTESTINAL TRACT N/A 10/23/2023    Procedure: ULTRASOUND, UPPER GI TRACT, ENDOSCOPIC;  Surgeon: Emil Villatoro MD;  Location: [facility] ENDO (2ND FLR);  Service: Endoscopy;  Laterality: N/A;    ENDOSCOPIC ULTRASOUND OF UPPER GASTROINTESTINAL TRACT N/A 7/24/2024    Procedure: ULTRASOUND, UPPER GI TRACT, ENDOSCOPIC;  Surgeon: Faustino Trujillo MD;  Location: [facility] ENDO (2ND FLR);  Service: Endoscopy;  Laterality: N/A;    ERCP N/A 10/23/2023    Procedure: ERCP (ENDOSCOPIC RETROGRADE CHOLANGIOPANCREATOGRAPHY);  Surgeon: Emil Villatoro MD;  Location: [facility] ENDO (2ND FLR);  Service: Endoscopy;  Laterality: N/A;    ESOPHAGOGASTRODUODENOSCOPY N/A 9/18/2023    Procedure: EGD (ESOPHAGOGASTRODUODENOSCOPY);  Surgeon: Kg Cleaning MD;  Location: [facility] ENDO (2ND FLR);  Service: Endoscopy;  Laterality: N/A;    ESOPHAGOGASTRODUODENOSCOPY N/A 3/8/2024    Procedure: EGD (ESOPHAGOGASTRODUODENOSCOPY);  Surgeon: Greg Love MD;  Location: [facility] ENDO (2ND FLR);  Service: Endoscopy;  Laterality: N/A;    ESOPHAGOGASTRODUODENOSCOPY N/A 7/24/2024    Procedure: EGD (ESOPHAGOGASTRODUODENOSCOPY);  Surgeon: Faustino Trujillo MD;  Location: [facility] ENDO (2ND FLR);  Service: Endoscopy;  Laterality: N/A;     Family History   Problem Relation Name Age of Onset    Pancreatitis Mother      Cancer Mother      Ovarian cancer Mother      No Known Problems Father      Cancer Brother      Breast cancer Maternal Grandmother       Social History[1]  Review of Systems    Physical Exam     Initial Vitals [05/19/25 1108]   BP Pulse Resp Temp SpO2   132/77 (!) 57 (!) 22 98.2 °F (36.8 °C) 98 %      MAP       --         Physical Exam    Nursing note and vitals reviewed.  Constitutional: He is not diaphoretic. No distress.   HENT:   Head: Normocephalic and atraumatic.   Neck: Neck supple.   Normal range of motion.  Cardiovascular:  Normal rate and regular rhythm.           Pulmonary/Chest: Breath sounds normal. No  respiratory distress. He has no wheezes. He has no rhonchi. He has no rales.   Abdominal: Abdomen is soft. He exhibits no distension. There is abdominal tenderness (Epigastric). There is no rebound and no guarding.   Musculoskeletal:         General: No edema.      Cervical back: Normal range of motion and neck supple.     Neurological: He is alert.   Skin: Skin is warm and dry.   Psychiatric: He has a normal mood and affect. Thought content normal.         ED Course   Procedures  Labs Reviewed   CBC WITH DIFFERENTIAL - Abnormal       Result Value    WBC 6.66      RBC 5.02      HGB 12.8 (*)     HCT 42.4      MCV 85      MCH 25.5 (*)     MCHC 30.2 (*)     RDW 21.6 (*)     Platelet Count 206      MPV 9.4      Nucleated RBC 0      Neut % 48.9      Lymph % 34.8      Mono % 13.1      Eos % 2.3      Basophil % 0.6      Imm Grans % 0.3      Neut # 3.26      Lymph # 2.32      Mono # 0.87      Eos # 0.15      Baso # 0.04      Imm Grans # 0.02     COMPREHENSIVE METABOLIC PANEL - Abnormal    Sodium 138      Potassium 3.5      Chloride 106      CO2 23      Glucose 110      BUN 9      Creatinine 0.7      Calcium 8.7      Protein Total 7.4      Albumin 4.0      Bilirubin Total 0.5      ALP 96      AST 51 (*)     ALT 43      Anion Gap 9      eGFR >60     LIPASE - Normal    Lipase Level 16     CBC W/ AUTO DIFFERENTIAL    Narrative:     The following orders were created for panel order CBC W/ AUTO DIFFERENTIAL.  Procedure                               Abnormality         Status                     ---------                               -----------         ------                     CBC with Differential[[id number]]       Abnormal            Final result                 Please view results for these tests on the individual orders.   COMPREHENSIVE METABOLIC PANEL          Imaging Results    None          Medications   sodium chloride 0.9% bolus 1,000 mL 1,000 mL (0 mLs Intravenous Stopped 5/19/25 1338)   morphine injection 4 mg (4 mg  Intravenous Given 5/19/25 1240)   metoclopramide injection 10 mg (10 mg Intravenous Given 5/19/25 1239)   morphine injection 4 mg (4 mg Intravenous Given 5/19/25 1403)     Medical Decision Making  25 y.o. male, with PMH of chronic alcohol-induced pancreatitis, prior necrotizing pancreatitis, alcohol use disorder, Corinne-hitchcock tear, splenic vein thrombosis, alcohol induced gastritis, HIV, asthma, anemia, polycythemia vera, who presented to the emergency department complaining of a pancreatitis flare.  Hemodynamically stable, afebrile and nontoxic appearing on arrival.  Epigastric tenderness on exam without rebound or guarding.  Negative Bland's sign and negative for McBurney's point tenderness.  Labs ordered in triage.  Patient had CT of the abdomen pelvis less than 2 weeks ago when he was hospitalized for acute on chronic pancreatitis.    Amount and/or Complexity of Data Reviewed  Labs: ordered. Decision-making details documented in ED Course.    Risk  Prescription drug management.               ED Course as of 05/19/25 1424   Mon May 19, 2025   1232 CT abdomen pelvis from 2 weeks ago:  Impression:     Findings suggestive of acute pancreatitis with similar peripancreatic fat stranding and small peripancreatic fluid collection.  No worsening inflammatory changes.     Chronic splenic vein thrombosis with collateral vessels in the upper abdomen, as seen previously.     Hepatosplenomegaly and hepatic steatosis.     Distended urinary bladder.     Other incidental findings discussed in the body of the report.        Electronically signed by:Reyes Gary MD  Date:                                            05/06/2025  Time:                                           09:02   [KL]   1322 CBC W/ AUTO DIFFERENTIAL(!)  CBC is grossly unremarkable.  No leukocytosis. Chronic, stable anemia, improved compared to baseline. [KL]   1334 Patient reports continued pain.  Additional morphine ordered. [KL]   1356 Lipase  Patient with  chronic pancreatitis, does not have elevation in lipase even despite radiographic evidence of acute flares. [KL]   1422 Comprehensive metabolic panel(!)  CMP grossly unremarkable.  No significant acidosis.  Electrolytes within normal limits.  Normal kidney function. Normal LFTs. [KL]   1422 Patient reassessed and feeling improved and ready for discharge.  No further episodes of vomiting here.  With only reported streaks and emesis, which happens chronically in the setting of patient's vomiting suspect due to Corinne-Chauhan.  Placed another referral to Gastroenterology for further evaluation of possible esophageal varices, given no history of this or recurrent symptoms in the emergency department feels stable for discharge close outpatient follow up.  Able to tolerate p.o. He expressed understanding to return for any worsening.    All results were discussed with patient. Strict ED precautions and return instructions were discussed. All questions were answered. Instructed to follow up with primary care doctor and GI for re-evaluation. Stable for discharge and outpatient follow up.   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  [K92.0] Hematemesis with nausea          ED Disposition Condition    Discharge Stable          ED Prescriptions    None       Follow-up Information       Follow up With Specialties Details Why Contact Info    [facility] - Emergency Dept Emergency Medicine Go to  As needed, If symptoms worsen [address] [facility] [address] [phone]    Pina Jones NP Family Medicine Schedule an appointment as soon as possible for a visit in 2 days  [address] [facility] [id number] [phone]                   [1]   Social History  Tobacco Use    Smoking status: Former     Types:  Cigarettes    Smokeless tobacco: Never   Substance Use Topics    Alcohol use: Yes     Alcohol/week: 4.0 standard drinks of alcohol     Types: 4 Shots of liquor per week    Drug use: Yes     Types: Marijuana        Ashley Culver MD  05/19/25 9112

## 2025-05-19 NOTE — FIRST PROVIDER EVALUATION
Emergency Department TeleTriage Encounter Note      CHIEF COMPLAINT    Chief Complaint   Patient presents with    Influenza    Abdominal Pain     Per pt he think he is having a pancreatitis flare. Reports hx of alcohol induced pancreatitis and reports alcohol intake on yesterday. Stated he is now having abd pain N/V        VITAL SIGNS   Initial Vitals [05/19/25 1108]   BP Pulse Resp Temp SpO2   132/77 (!) 57 (!) 22 98.2 °F (36.8 °C) 98 %      MAP       --            ALLERGIES    Review of patient's allergies indicates:   Allergen Reactions    [place] Swelling    Droperidol Hives    Pecan nut Swelling    Penicillins Hives     Tolerates cephalosporins    Tolerated piperacillin/tazobactam 11/2023    Sulfa (sulfonamide antibiotics) Hives    Tolonium chloride(toluidine blue-o) Hives, Itching and Rash    Toradol [ketorolac] Rash       PROVIDER TRIAGE NOTE  This is a teletriage evaluation of a 25 y.o. male presenting to the ED complaining of abdominal pain. Patient reports drinking an espresso martini yesterday. He has had epigastric pain, nausea, and vomiting since last night. He has not been able to tolerate PO intake. He took promethazine but vomited shortly after.    Patient is alert and oriented. He speaks in complete sentences. He is sitting upright in the chair in no distress.     Initial orders will be placed and care will be transferred to an alternate provider when patient is roomed for a full evaluation. Any additional orders and the final disposition will be determined by that provider.         ORDERS  Labs Reviewed   CBC W/ AUTO DIFFERENTIAL    Narrative:     The following orders were created for panel order CBC W/ AUTO DIFFERENTIAL.  Procedure                               Abnormality         Status                     ---------                               -----------         ------                     CBC with Differential[[id number]]                                                        Please view  results for these tests on the individual orders.   COMPREHENSIVE METABOLIC PANEL   LIPASE   URINALYSIS, REFLEX TO URINE CULTURE   CBC WITH DIFFERENTIAL       ED Orders (720h ago, onward)      Start Ordered     Status Ordering Provider    05/19/25 1145 05/19/25 1130  ondansetron injection 4 mg  ED 1 Time         Ordered WADE, IBRAHIMA G.    05/19/25 1145 05/19/25 1130  sodium chloride 0.9% bolus 1,000 mL 1,000 mL  ED 1 Time         Ordered WADE, IBRAHIMA G.    05/19/25 1131 05/19/25 1130  Vital signs  Every 2 hours         Ordered WADE, IBRAHIMA G.    05/19/25 1130 05/19/25 1130  Diet NPO  Diet effective now         Ordered WADE, IBRAHIMA G.    05/19/25 1130 05/19/25 1130  Insert peripheral IV  Once         Ordered WADE, IBRAHIMA G.    05/19/25 1130 05/19/25 1130  CBC W/ AUTO DIFFERENTIAL  STAT         Ordered WADE, IBRAHIMA G.    05/19/25 1130 05/19/25 1130  Comp. Metabolic Panel  STAT         Ordered WADE, IBRAHIMA G.    05/19/25 1130 05/19/25 1130  POCT Venous Blood Gas (Creatinine Only)  Once        Comments: This test should be used for VBGs.  If using this order for other tests (K, creatinine, HCT, PT/INR, lactate etc)  ONLY do so in the case of an emergency or rapid response.Notify Physician if: see parameters below.      Ordered WADE, IBRAHIMA G.    05/19/25 1130 05/19/25 1130  Lipase  STAT         Ordered WADE, IBRAHIMA G.    05/19/25 1130 05/19/25 1130  Urinalysis, Reflex to Urine Culture Urine, Clean Catch  STAT         Ordered WADE, IBRAHIMA G.    05/19/25 1130 05/19/25 1130  CBC with Differential  PROCEDURE ONCE         Ordered WADE, IBRAHIMA G.              Virtual Visit Note: The provider triage portion of this emergency department evaluation and documentation was performed via [employer], a HIPAA-compliant telemedicine application, in concert with a tele-presenter in the room. A face to face patient evaluation with one of my colleagues will occur once the patient is placed in an emergency department  room.      DISCLAIMER: This note was prepared with [employer] voice recognition transcription software. Garbled syntax, mangled pronouns, and other bizarre constructions may be attributed to that software system.

## 2025-05-19 NOTE — ED TRIAGE NOTES
26 yo male reports  to ed  with co L sided abd pain with N/V. Reports small amount of bloody streaks in emesis. Denies blood in stool. Endorses drinking three martinis last night. Hx of pancreatitis. aaox4

## 2025-05-20 ENCOUNTER — PATIENT MESSAGE (OUTPATIENT)
Dept: INFECTIOUS DISEASES | Facility: CLINIC | Age: 25
End: 2025-05-20
Payer: MEDICAID

## 2025-05-24 ENCOUNTER — HOSPITAL ENCOUNTER (EMERGENCY)
Facility: OTHER | Age: 25
Discharge: LEFT BEFORE DISCHARGE | End: 2025-05-24
Payer: MEDICAID

## 2025-05-24 VITALS
TEMPERATURE: 98 F | OXYGEN SATURATION: 98 % | BODY MASS INDEX: 22.9 KG/M2 | HEIGHT: 70 IN | RESPIRATION RATE: 18 BRPM | WEIGHT: 160 LBS | SYSTOLIC BLOOD PRESSURE: 124 MMHG | HEART RATE: 97 BPM | DIASTOLIC BLOOD PRESSURE: 85 MMHG

## 2025-05-24 PROCEDURE — 99281 EMR DPT VST MAYX REQ PHY/QHP: CPT

## 2025-05-24 NOTE — FIRST PROVIDER EVALUATION
Emergency Department TeleTriage Encounter Note      CHIEF COMPLAINT    Chief Complaint   Patient presents with    Abdominal Pain     LUQ pain x 1.5 days with N/V/D. Hx of pancreatitis.        VITAL SIGNS   Initial Vitals [05/24/25 1636]   BP Pulse Resp Temp SpO2   124/85 97 18 98.2 °F (36.8 °C) 98 %      MAP       --            ALLERGIES    Review of patient's allergies indicates:   Allergen Reactions    [place] Swelling    Droperidol Hives    Morphine     Pecan nut Swelling    Penicillins Hives     Tolerates cephalosporins    Tolerated piperacillin/tazobactam 11/2023    Sulfa (sulfonamide antibiotics) Hives    Opioids - morphine analogues Itching, Nausea And Vomiting and Rash    Tolonium chloride(toluidine blue-o) Hives, Itching and Rash    Toradol [ketorolac] Rash       PROVIDER TRIAGE NOTE  Verbal consent for the teletriage evaluation was given by the patient at the start of the evaluation.  All efforts will be made to maintain patient's privacy during the evaluation.      This is a teletriage evaluation of a 25 y.o. male presenting to the ED with c/o has chronic pancreatitis with LUQ abd pain, vomiting, and diarrhea for 2 days. Limited physical exam via telehealth: The patient is awake, alert, answering questions appropriately and is not in respiratory distress.  As the Teletriage provider, I performed an initial assessment and ordered appropriate labs and imaging studies, if any, to facilitate the patient's care once placed in the ED. Once a room is available, care and a full evaluation will be completed by an alternate ED provider.  Any additional orders and the final disposition will be determined by that provider.  All imaging and labs will not be followed-up by the Teletriage Team, including myself.          ORDERS  Labs Reviewed   CBC W/ AUTO DIFFERENTIAL    Narrative:     The following orders were created for panel order CBC W/ AUTO DIFFERENTIAL.  Procedure                               Abnormality          Status                     ---------                               -----------         ------                     CBC with Differential[[id number]]                                                        Please view results for these tests on the individual orders.   COMPREHENSIVE METABOLIC PANEL   LIPASE   URINALYSIS, REFLEX TO URINE CULTURE   CBC WITH DIFFERENTIAL       ED Orders (720h ago, onward)      Start Ordered     Status Ordering Provider    05/24/25 1701 05/24/25 1700  Vital signs  Every 2 hours         Ordered MORRIS NGUYEN    05/24/25 1701 05/24/25 1700  Diet NPO  Diet effective now         Ordered MORRIS NGUYEN    05/24/25 1701 05/24/25 1700  Insert peripheral IV  Once         Ordered MORRIS NGUYEN    05/24/25 1701 05/24/25 1700  CBC W/ AUTO DIFFERENTIAL  STAT         Ordered MORRIS NGUYEN    05/24/25 1701 05/24/25 1700  Comp. Metabolic Panel  STAT         Ordered MORRIS NGUYEN    05/24/25 1701 05/24/25 1700  Lipase  STAT         Ordered MORRIS NGUYEN    05/24/25 1701 05/24/25 1700  Urinalysis, Reflex to Urine Culture Urine, Clean Catch  STAT         Ordered MORRIS NGUYEN    05/24/25 1701 05/24/25 1700  CBC with Differential  PROCEDURE ONCE         Ordered MORRIS NGUYEN              Virtual Visit Note: The provider triage portion of this emergency department evaluation and documentation was performed via [employer], a HIPAA-compliant telemedicine application, in concert with a tele-presenter in the room. A face to face patient evaluation with one of my colleagues will occur once the patient is placed in an emergency department room.      DISCLAIMER: This note was prepared with [employer] voice recognition transcription software. Garbled syntax, mangled pronouns, and other bizarre constructions may be attributed to that software system.

## 2025-05-26 ENCOUNTER — HOSPITAL ENCOUNTER (EMERGENCY)
Facility: OTHER | Age: 25
Discharge: HOME | End: 2025-05-26
Attending: EMERGENCY MEDICINE
Payer: MEDICAID

## 2025-05-26 VITALS
HEART RATE: 79 BPM | OXYGEN SATURATION: 98 % | SYSTOLIC BLOOD PRESSURE: 122 MMHG | TEMPERATURE: 98 F | DIASTOLIC BLOOD PRESSURE: 85 MMHG | BODY MASS INDEX: 23.68 KG/M2 | WEIGHT: 165 LBS | RESPIRATION RATE: 18 BRPM

## 2025-05-26 DIAGNOSIS — K85.90 ACUTE ON CHRONIC PANCREATITIS: Primary | ICD-10-CM

## 2025-05-26 DIAGNOSIS — K86.1 ACUTE ON CHRONIC PANCREATITIS: Primary | ICD-10-CM

## 2025-05-26 LAB
ABSOLUTE EOSINOPHIL (OHS): 0.15 K/UL
ABSOLUTE MONOCYTE (OHS): 0.77 K/UL (ref 0.3–1)
ABSOLUTE NEUTROPHIL COUNT (OHS): 2.54 K/UL (ref 1.8–7.7)
ALBUMIN SERPL BCP-MCNC: 3.5 G/DL (ref 3.5–5.2)
ALP SERPL-CCNC: 88 UNIT/L (ref 40–150)
ALT SERPL W/O P-5'-P-CCNC: 49 UNIT/L (ref 10–44)
ANION GAP (OHS): 9 MMOL/L (ref 8–16)
AST SERPL-CCNC: 55 UNIT/L (ref 11–45)
BASOPHILS # BLD AUTO: 0.06 K/UL
BASOPHILS NFR BLD AUTO: 1 %
BILIRUB SERPL-MCNC: 0.8 MG/DL (ref 0.1–1)
BUN SERPL-MCNC: 7 MG/DL (ref 6–20)
CALCIUM SERPL-MCNC: 7.9 MG/DL (ref 8.7–10.5)
CHLORIDE SERPL-SCNC: 109 MMOL/L (ref 95–110)
CO2 SERPL-SCNC: 20 MMOL/L (ref 23–29)
CREAT SERPL-MCNC: 0.6 MG/DL (ref 0.5–1.4)
ERYTHROCYTE [DISTWIDTH] IN BLOOD BY AUTOMATED COUNT: 20.1 % (ref 11.5–14.5)
ETHANOL SERPL-MCNC: <10 MG/DL
GFR SERPLBLD CREATININE-BSD FMLA CKD-EPI: >60 ML/MIN/1.73/M2
GLUCOSE SERPL-MCNC: 96 MG/DL (ref 70–110)
HCT VFR BLD AUTO: 42.2 % (ref 40–54)
HGB BLD-MCNC: 12.8 GM/DL (ref 14–18)
IMM GRANULOCYTES # BLD AUTO: 0.01 K/UL (ref 0–0.04)
IMM GRANULOCYTES NFR BLD AUTO: 0.2 % (ref 0–0.5)
LIPASE SERPL-CCNC: 13 U/L (ref 4–60)
LYMPHOCYTES # BLD AUTO: 2.4 K/UL (ref 1–4.8)
MCH RBC QN AUTO: 25.4 PG (ref 27–31)
MCHC RBC AUTO-ENTMCNC: 30.3 G/DL (ref 32–36)
MCV RBC AUTO: 84 FL (ref 82–98)
NUCLEATED RBC (/100WBC) (OHS): 0 /100 WBC
PLATELET # BLD AUTO: 246 K/UL (ref 150–450)
PMV BLD AUTO: 9.6 FL (ref 9.2–12.9)
POTASSIUM SERPL-SCNC: 3.5 MMOL/L (ref 3.5–5.1)
PROT SERPL-MCNC: 6.7 GM/DL (ref 6–8.4)
RBC # BLD AUTO: 5.04 M/UL (ref 4.6–6.2)
RELATIVE EOSINOPHIL (OHS): 2.5 %
RELATIVE LYMPHOCYTE (OHS): 40.5 % (ref 18–48)
RELATIVE MONOCYTE (OHS): 13 % (ref 4–15)
RELATIVE NEUTROPHIL (OHS): 42.8 % (ref 38–73)
SODIUM SERPL-SCNC: 138 MMOL/L (ref 136–145)
WBC # BLD AUTO: 5.93 K/UL (ref 3.9–12.7)

## 2025-05-26 PROCEDURE — 82077 ASSAY SPEC XCP UR&BREATH IA: CPT | Performed by: NURSE PRACTITIONER

## 2025-05-26 PROCEDURE — 63600175 PHARM REV CODE 636 W HCPCS: Performed by: PHYSICIAN ASSISTANT

## 2025-05-26 PROCEDURE — 96365 THER/PROPH/DIAG IV INF INIT: CPT

## 2025-05-26 PROCEDURE — 83690 ASSAY OF LIPASE: CPT | Performed by: NURSE PRACTITIONER

## 2025-05-26 PROCEDURE — 25000003 PHARM REV CODE 250: Performed by: PHYSICIAN ASSISTANT

## 2025-05-26 PROCEDURE — 99284 EMERGENCY DEPT VISIT MOD MDM: CPT | Mod: 25

## 2025-05-26 PROCEDURE — 96376 TX/PRO/DX INJ SAME DRUG ADON: CPT

## 2025-05-26 PROCEDURE — 63600175 PHARM REV CODE 636 W HCPCS: Performed by: NURSE PRACTITIONER

## 2025-05-26 PROCEDURE — 82040 ASSAY OF SERUM ALBUMIN: CPT | Performed by: NURSE PRACTITIONER

## 2025-05-26 PROCEDURE — 96375 TX/PRO/DX INJ NEW DRUG ADDON: CPT

## 2025-05-26 PROCEDURE — 96361 HYDRATE IV INFUSION ADD-ON: CPT

## 2025-05-26 PROCEDURE — 25000003 PHARM REV CODE 250: Performed by: NURSE PRACTITIONER

## 2025-05-26 PROCEDURE — 85025 COMPLETE CBC W/AUTO DIFF WBC: CPT | Performed by: NURSE PRACTITIONER

## 2025-05-26 RX ORDER — MORPHINE SULFATE 4 MG/ML
4 INJECTION, SOLUTION INTRAMUSCULAR; INTRAVENOUS
Refills: 0 | Status: COMPLETED | OUTPATIENT
Start: 2025-05-26 | End: 2025-05-26

## 2025-05-26 RX ORDER — HYDROMORPHONE HYDROCHLORIDE 1 MG/ML
1 INJECTION, SOLUTION INTRAMUSCULAR; INTRAVENOUS; SUBCUTANEOUS
Refills: 0 | Status: COMPLETED | OUTPATIENT
Start: 2025-05-26 | End: 2025-05-26

## 2025-05-26 RX ORDER — HYDROMORPHONE HYDROCHLORIDE 1 MG/ML
0.25 INJECTION, SOLUTION INTRAMUSCULAR; INTRAVENOUS; SUBCUTANEOUS
Status: COMPLETED | OUTPATIENT
Start: 2025-05-26 | End: 2025-05-26

## 2025-05-26 RX ORDER — DIPHENHYDRAMINE HYDROCHLORIDE 50 MG/ML
12.5 INJECTION, SOLUTION INTRAMUSCULAR; INTRAVENOUS
Status: COMPLETED | OUTPATIENT
Start: 2025-05-26 | End: 2025-05-26

## 2025-05-26 RX ORDER — ONDANSETRON HYDROCHLORIDE 2 MG/ML
4 INJECTION, SOLUTION INTRAVENOUS
Status: COMPLETED | OUTPATIENT
Start: 2025-05-26 | End: 2025-05-26

## 2025-05-26 RX ADMIN — HYDROMORPHONE HYDROCHLORIDE 0.25 MG: 1 INJECTION, SOLUTION INTRAMUSCULAR; INTRAVENOUS; SUBCUTANEOUS at 06:05

## 2025-05-26 RX ADMIN — MORPHINE SULFATE 4 MG: 4 INJECTION INTRAVENOUS at 03:05

## 2025-05-26 RX ADMIN — PROMETHAZINE HYDROCHLORIDE 12.5 MG: 25 INJECTION INTRAMUSCULAR; INTRAVENOUS at 04:05

## 2025-05-26 RX ADMIN — SODIUM CHLORIDE 1000 ML: 9 INJECTION, SOLUTION INTRAVENOUS at 03:05

## 2025-05-26 RX ADMIN — HYDROMORPHONE HYDROCHLORIDE 1 MG: 1 INJECTION, SOLUTION INTRAMUSCULAR; INTRAVENOUS; SUBCUTANEOUS at 04:05

## 2025-05-26 RX ADMIN — ONDANSETRON 4 MG: 2 INJECTION INTRAMUSCULAR; INTRAVENOUS at 03:05

## 2025-05-26 RX ADMIN — DIPHENHYDRAMINE HYDROCHLORIDE 12.5 MG: 50 INJECTION, SOLUTION INTRAMUSCULAR; INTRAVENOUS at 03:05

## 2025-05-26 NOTE — FIRST PROVIDER EVALUATION
Emergency Department TeleTriage Encounter Note      CHIEF COMPLAINT    Chief Complaint   Patient presents with    Abdominal Pain     Hx chronic pancreatitis; last ETOH drink yesterday, began with mid epigastrits pain and vomiting Friday.        VITAL SIGNS   Initial Vitals [05/26/25 1117]   BP Pulse Resp Temp SpO2   129/83 70 18 98.4 °F (36.9 °C) 100 %      MAP       --            ALLERGIES    Review of patient's allergies indicates:   Allergen Reactions    [place] Swelling    Droperidol Hives    Morphine     Pecan nut Swelling    Penicillins Hives     Tolerates cephalosporins    Tolerated piperacillin/tazobactam 11/2023    Sulfa (sulfonamide antibiotics) Hives    Opioids - morphine analogues Itching, Nausea And Vomiting and Rash    Tolonium chloride(toluidine blue-o) Hives, Itching and Rash    Toradol [ketorolac] Rash       PROVIDER TRIAGE NOTE  25 year old male with history of alcoholic pancreatitis who reports last drank more than 1 espresso martini yesterday afternoon, presents to the ER with complaints of nausea, vomiting, and LUQ/epigastric abdominal pain since late last night. Denies fever or diarrhea. Reports abdominal pain is constant. Denies urinary complaints.       AAOx3, respirations even and non- labored, stable vitals, normal coloration of skin, sitting upright in triage chair, appears in no acute distress.          ORDERS  Labs Reviewed - No data to display    ED Orders (720h ago, onward)      None              Virtual Visit Note: The provider triage portion of this emergency department evaluation and documentation was performed via [employer], a HIPAA-compliant telemedicine application, in concert with a tele-presenter in the room. A face to face patient evaluation with one of my colleagues will occur once the patient is placed in an emergency department room.      DISCLAIMER: This note was prepared with [employer] voice recognition transcription software. Garbled syntax, mangled pronouns, and other  bizarre constructions may be attributed to that software system.

## 2025-05-26 NOTE — DISCHARGE INSTRUCTIONS
Clear liquids    DO NOT DRINK ALCOHOL    Follow up with GI    Return to ED with any worsening symptoms or concerns

## 2025-05-26 NOTE — ED PROVIDER NOTES
Encounter Date: 5/26/2025       History     Chief Complaint   Patient presents with    Abdominal Pain     Hx chronic pancreatitis; last ETOH drink yesterday, began with mid epigastrits pain and vomiting Friday.      Patient is a 25-year-old male with history of alcohol use disorder, chronic pancreatitis, HIV, splenic vein thrombosis who presents to the emergency department with concern for pancreatitis flare.  Patient reports his sister is in town currently and he has been drinking more than normal.  Reports his last alcoholic beverage was yesterday.  Reports in the night he woke up with nausea and vomiting.  Reports left upper quadrant pain.  Denies fevers.  Requesting something stronger than morphine as he does not respond well to it.    The history is provided by the patient.     Review of patient's allergies indicates:   Allergen Reactions    [place] Swelling    Droperidol Hives    Morphine     Pecan nut Swelling    Penicillins Hives     Tolerates cephalosporins    Tolerated piperacillin/tazobactam 11/2023    Sulfa (sulfonamide antibiotics) Hives    Opioids - morphine analogues Itching, Nausea And Vomiting and Rash    Tolonium chloride(toluidine blue-o) Hives, Itching and Rash    Toradol [ketorolac] Rash     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     Past Surgical History:   Procedure Laterality Date    ENDOSCOPIC ULTRASOUND OF UPPER GASTROINTESTINAL TRACT N/A 10/23/2023    Procedure: ULTRASOUND, UPPER GI TRACT, ENDOSCOPIC;  Surgeon: Emil Villatoro  MD LEONIE;  [facility]  Service: Endoscopy;  Laterality: N/A;    ENDOSCOPIC ULTRASOUND OF UPPER GASTROINTESTINAL TRACT N/A 7/24/2024    Procedure: ULTRASOUND, UPPER GI TRACT, ENDOSCOPIC;  Surgeon: Faustino Trujillo MD;  Location: [facility] (2ND FLR);  Service: Endoscopy;  Laterality: N/A;    ERCP N/A 10/23/2023    Procedure: ERCP (ENDOSCOPIC RETROGRADE CHOLANGIOPANCREATOGRAPHY);  Surgeon: Emil Villatoro MD;  Location: [facility] (2ND FLR);  Service: Endoscopy;  Laterality: N/A;    ESOPHAGOGASTRODUODENOSCOPY N/A 9/18/2023    Procedure: EGD (ESOPHAGOGASTRODUODENOSCOPY);  Surgeon: Kg Cleaning MD;  Location: [facility] (2ND FLR);  Service: Endoscopy;  Laterality: N/A;    ESOPHAGOGASTRODUODENOSCOPY N/A 3/8/2024    Procedure: EGD (ESOPHAGOGASTRODUODENOSCOPY);  Surgeon: Greg Love MD;  [facility]  Service: Endoscopy;  Laterality: N/A;    ESOPHAGOGASTRODUODENOSCOPY N/A 7/24/2024    Procedure: EGD (ESOPHAGOGASTRODUODENOSCOPY);  Surgeon: Faustino Trujillo MD;  [facility]  Service: Endoscopy;  Laterality: N/A;     Family History   Problem Relation Name Age of Onset    Pancreatitis Mother      Cancer Mother      Ovarian cancer Mother      No Known Problems Father      Cancer Brother      Breast cancer Maternal Grandmother       Social History[1]  Review of Systems   Constitutional:  Positive for appetite change. Negative for activity change, chills, fatigue and fever.   HENT:  Negative for congestion, ear discharge, ear pain, postnasal drip, rhinorrhea, sore throat and trouble swallowing.    Eyes:  Negative for photophobia and visual disturbance.   Respiratory:  Negative for cough and shortness of breath.    Cardiovascular:  Negative for chest pain.   Gastrointestinal:  Positive for abdominal pain, nausea and vomiting. Negative for blood in stool, constipation and diarrhea.   Genitourinary:  Negative for dysuria, flank pain and hematuria.    Musculoskeletal:  Negative for back pain, neck pain and neck stiffness.   Skin:  Negative for rash and wound.   Neurological:  Negative for dizziness, light-headedness and headaches.       Physical Exam     Initial Vitals [05/26/25 1117]   BP Pulse Resp Temp SpO2   129/83 70 18 98.4 °F (36.9 °C) 100 %      MAP       --         Physical Exam    Nursing note and vitals reviewed.  Constitutional: Vital signs are normal. He appears well-developed and well-nourished. He is not diaphoretic.  Non-toxic appearance. No distress.   HENT:   Head: Normocephalic.   Right Ear: Hearing and external ear normal.   Left Ear: Hearing and external ear normal.   Nose: Nose normal. Mouth/Throat: Uvula is midline, oropharynx is clear and moist and mucous membranes are normal. No oropharyngeal exudate.   Eyes: Conjunctivae are normal. Pupils are equal, round, and reactive to light.   Neck:   Normal range of motion.  Cardiovascular:  Normal rate and regular rhythm.           Pulmonary/Chest: Breath sounds normal.   Abdominal: Abdomen is soft. Bowel sounds are normal. He exhibits no distension and no mass. There is abdominal tenderness (very mild tender in LUQ with deep palpation). There is no rebound and no guarding.   Musculoskeletal:         General: Normal range of motion.      Cervical back: Normal range of motion.     Neurological: He is alert and oriented to person, place, and time.   Skin: Skin is warm and dry. Capillary refill takes less than 2 seconds.   Psychiatric: He has a normal mood and affect.         ED Course   Procedures  Labs Reviewed   COMPREHENSIVE METABOLIC PANEL - Abnormal       Result Value    Sodium 138      Potassium 3.5      Chloride 109      CO2 20 (*)     Glucose 96      BUN 7      Creatinine 0.6      Calcium 7.9 (*)     Protein Total 6.7      Albumin 3.5      Bilirubin Total 0.8      ALP 88      AST 55 (*)     ALT 49 (*)     Anion Gap 9      eGFR >60     CBC WITH DIFFERENTIAL - Abnormal    WBC 5.93      RBC  5.04      HGB 12.8 (*)     HCT 42.2      MCV 84      MCH 25.4 (*)     MCHC 30.3 (*)     RDW 20.1 (*)     Platelet Count 246      MPV 9.6      Nucleated RBC 0      Neut % 42.8      Lymph % 40.5      Mono % 13.0      Eos % 2.5      Basophil % 1.0      Imm Grans % 0.2      Neut # 2.54      Lymph # 2.40      Mono # 0.77      Eos # 0.15      Baso # 0.06      Imm Grans # 0.01     LIPASE - Normal    Lipase Level 13     ALCOHOL,MEDICAL (ETHANOL) - Normal    Alcohol, Serum <10     CBC W/ AUTO DIFFERENTIAL    Narrative:     The following orders were created for panel order CBC auto differential.  Procedure                               Abnormality         Status                     ---------                               -----------         ------                     CBC with Differential[[id number]]       Abnormal            Final result                 Please view results for these tests on the individual orders.   URINALYSIS, REFLEX TO URINE CULTURE          Imaging Results    None          Medications   HYDROmorphone injection 0.25 mg (has no administration in time range)   sodium chloride 0.9% bolus 1,000 mL 1,000 mL (0 mLs Intravenous Stopped 5/26/25 1650)   ondansetron injection 4 mg (4 mg Intravenous Given 5/26/25 1511)   morphine injection 4 mg (4 mg Intravenous Given 5/26/25 1534)   diphenhydrAMINE injection 12.5 mg (12.5 mg Intravenous Given 5/26/25 1534)   HYDROmorphone injection 1 mg (1 mg Intravenous Given 5/26/25 1653)   promethazine (PHENERGAN) 12.5 mg in 0.9% NaCl 50 mL IVPB (0 mg Intravenous Stopped 5/26/25 1748)     Medical Decision Making  Urgent evaluation of a 25-year-old male who presents to the emergency department with concern for pancreatitis.  Patient is afebrile, nontoxic-appearing, and hemodynamically stable.  Patient is very mildly tender in the left upper quadrant with deep palpation.  Moist mucous membranes.  No clinical evidence to suggest dehydration.  Patient's chart is reviewed in depth  with multiple visits with pancreatitis and not significantly elevated lipase.  Most recent CT this month showed:     Findings suggestive of acute pancreatitis with similar peripancreatic fat stranding and small peripancreatic fluid collection.  No worsening inflammatory changes.  Chronic splenic vein thrombosis with collateral vessels in the upper abdomen, as seen previously.  Hepatosplenomegaly and hepatic steatosis.  Distended urinary bladder.  Other incidental findings discussed in the body of the report.    Patient was initially seen in triage with labs fluids and antiemetics ordered.  Will add on pain medication although patient reports he does not want morphine.  I explained to him that he did have morphine at his last ER visit and responded okay to it.    4:36 PM  Patient is requesting Dilaudid.  Labs show no significant leukocytosis, anemia.  Lipase is normal.  Chemistry is hemolyzed and being redrawn now.  Will give a dose of Dilaudid and Phenergan now.    5:59 PM  Reports he is feeling better and does not want to stay in hospital.  Chemistry shows mildly elevated LFTs - normal bili.  Normal gap.  No CHARY.  No electrolyte disruption.  Will give one more dose of pain meds - did offer admission - pt declines.  Advised clear liquids and follow up with GI.  Advised to return to ED with any worsening symptoms or concerns.  Discussed with supervising physician who agrees with plan.  Pt is counseled on importance of alcohol cessation.      Risk  Prescription drug management.                                      Clinical Impression:  Final diagnoses:  [K85.90, K86.1] Acute on chronic pancreatitis (Primary)          ED Disposition Condition    Discharge Stable          ED Prescriptions    None       Follow-up Information    None                  [1]   Social History  Tobacco Use    Smoking status: Former     Types: Cigarettes    Smokeless tobacco: Never   Substance Use Topics    Alcohol use: Yes     Alcohol/week: 4.0  standard drinks of alcohol     Types: 4 Shots of liquor per week    Drug use: Yes     Types: Marijuana        Juanita Zuñiga PA-C  05/26/25 6014

## 2025-05-26 NOTE — ED TRIAGE NOTES
Pt reports abd pain for the past two days. It lessened yesterday and then worsened today. The pain is to the LUQ. He is having N/V/D. He has a hx of pancreatitis. Last drink yesteday. Says he drinks often but no daily

## 2025-05-28 ENCOUNTER — HOSPITAL ENCOUNTER (EMERGENCY)
Facility: OTHER | Age: 25
Discharge: HOME | End: 2025-05-28
Attending: EMERGENCY MEDICINE
Payer: MEDICAID

## 2025-05-28 VITALS
HEIGHT: 70 IN | DIASTOLIC BLOOD PRESSURE: 98 MMHG | HEART RATE: 81 BPM | SYSTOLIC BLOOD PRESSURE: 136 MMHG | WEIGHT: 165 LBS | BODY MASS INDEX: 23.62 KG/M2 | RESPIRATION RATE: 15 BRPM | TEMPERATURE: 98 F | OXYGEN SATURATION: 99 %

## 2025-05-28 DIAGNOSIS — R10.13 EPIGASTRIC ABDOMINAL PAIN: Primary | ICD-10-CM

## 2025-05-28 DIAGNOSIS — R74.01 TRANSAMINITIS: ICD-10-CM

## 2025-05-28 LAB
ABSOLUTE EOSINOPHIL (OHS): 0.09 K/UL
ABSOLUTE MONOCYTE (OHS): 0.54 K/UL (ref 0.3–1)
ABSOLUTE NEUTROPHIL COUNT (OHS): 2.21 K/UL (ref 1.8–7.7)
ALBUMIN SERPL BCP-MCNC: 3.7 G/DL (ref 3.5–5.2)
ALP SERPL-CCNC: 100 UNIT/L (ref 40–150)
ALT SERPL W/O P-5'-P-CCNC: 58 UNIT/L (ref 10–44)
ANION GAP (OHS): 10 MMOL/L (ref 8–16)
AST SERPL-CCNC: 82 UNIT/L (ref 11–45)
BASOPHILS # BLD AUTO: 0.03 K/UL
BASOPHILS NFR BLD AUTO: 0.7 %
BILIRUB SERPL-MCNC: 0.2 MG/DL (ref 0.1–1)
BUN SERPL-MCNC: 7 MG/DL (ref 6–20)
CALCIUM SERPL-MCNC: 8.6 MG/DL (ref 8.7–10.5)
CHLORIDE SERPL-SCNC: 104 MMOL/L (ref 95–110)
CO2 SERPL-SCNC: 23 MMOL/L (ref 23–29)
CREAT SERPL-MCNC: 0.7 MG/DL (ref 0.5–1.4)
CREAT SERPL-MCNC: 0.7 MG/DL (ref 0.5–1.4)
ERYTHROCYTE [DISTWIDTH] IN BLOOD BY AUTOMATED COUNT: 20.2 % (ref 11.5–14.5)
GFR SERPLBLD CREATININE-BSD FMLA CKD-EPI: >60 ML/MIN/1.73/M2
GLUCOSE SERPL-MCNC: 125 MG/DL (ref 70–110)
HCT VFR BLD AUTO: 38.7 % (ref 40–54)
HGB BLD-MCNC: 11.7 GM/DL (ref 14–18)
HOLD SPECIMEN: NORMAL
HOLD SPECIMEN: NORMAL
IMM GRANULOCYTES # BLD AUTO: 0.01 K/UL (ref 0–0.04)
IMM GRANULOCYTES NFR BLD AUTO: 0.2 % (ref 0–0.5)
LIPASE SERPL-CCNC: 22 U/L (ref 4–60)
LYMPHOCYTES # BLD AUTO: 1.44 K/UL (ref 1–4.8)
MCH RBC QN AUTO: 25.6 PG (ref 27–31)
MCHC RBC AUTO-ENTMCNC: 30.2 G/DL (ref 32–36)
MCV RBC AUTO: 85 FL (ref 82–98)
NUCLEATED RBC (/100WBC) (OHS): 0 /100 WBC
PLATELET # BLD AUTO: 207 K/UL (ref 150–450)
PMV BLD AUTO: 10.5 FL (ref 9.2–12.9)
POTASSIUM SERPL-SCNC: 3.7 MMOL/L (ref 3.5–5.1)
PROT SERPL-MCNC: 7.4 GM/DL (ref 6–8.4)
RBC # BLD AUTO: 4.57 M/UL (ref 4.6–6.2)
RELATIVE EOSINOPHIL (OHS): 2.1 %
RELATIVE LYMPHOCYTE (OHS): 33.3 % (ref 18–48)
RELATIVE MONOCYTE (OHS): 12.5 % (ref 4–15)
RELATIVE NEUTROPHIL (OHS): 51.2 % (ref 38–73)
SAMPLE: NORMAL
SODIUM SERPL-SCNC: 137 MMOL/L (ref 136–145)
WBC # BLD AUTO: 4.32 K/UL (ref 3.9–12.7)

## 2025-05-28 PROCEDURE — 99284 EMERGENCY DEPT VISIT MOD MDM: CPT | Mod: 25

## 2025-05-28 PROCEDURE — 96361 HYDRATE IV INFUSION ADD-ON: CPT

## 2025-05-28 PROCEDURE — 83690 ASSAY OF LIPASE: CPT

## 2025-05-28 PROCEDURE — 63600175 PHARM REV CODE 636 W HCPCS

## 2025-05-28 PROCEDURE — 80053 COMPREHEN METABOLIC PANEL: CPT

## 2025-05-28 PROCEDURE — 96375 TX/PRO/DX INJ NEW DRUG ADDON: CPT

## 2025-05-28 PROCEDURE — 96374 THER/PROPH/DIAG INJ IV PUSH: CPT

## 2025-05-28 PROCEDURE — 96376 TX/PRO/DX INJ SAME DRUG ADON: CPT

## 2025-05-28 PROCEDURE — 85025 COMPLETE CBC W/AUTO DIFF WBC: CPT

## 2025-05-28 PROCEDURE — 25000003 PHARM REV CODE 250

## 2025-05-28 RX ORDER — METOCLOPRAMIDE HYDROCHLORIDE 5 MG/ML
10 INJECTION INTRAMUSCULAR; INTRAVENOUS
Status: COMPLETED | OUTPATIENT
Start: 2025-05-28 | End: 2025-05-28

## 2025-05-28 RX ORDER — HYDROMORPHONE HYDROCHLORIDE 1 MG/ML
1 INJECTION, SOLUTION INTRAMUSCULAR; INTRAVENOUS; SUBCUTANEOUS
Refills: 0 | Status: COMPLETED | OUTPATIENT
Start: 2025-05-28 | End: 2025-05-28

## 2025-05-28 RX ORDER — METOCLOPRAMIDE 10 MG/1
10 TABLET ORAL EVERY 6 HOURS
Qty: 30 TABLET | Refills: 0 | Status: SHIPPED | OUTPATIENT
Start: 2025-05-28

## 2025-05-28 RX ADMIN — HYDROMORPHONE HYDROCHLORIDE 1 MG: 1 INJECTION, SOLUTION INTRAMUSCULAR; INTRAVENOUS; SUBCUTANEOUS at 03:05

## 2025-05-28 RX ADMIN — METOCLOPRAMIDE 10 MG: 5 INJECTION, SOLUTION INTRAMUSCULAR; INTRAVENOUS at 01:05

## 2025-05-28 RX ADMIN — HYDROMORPHONE HYDROCHLORIDE 1 MG: 1 INJECTION, SOLUTION INTRAMUSCULAR; INTRAVENOUS; SUBCUTANEOUS at 01:05

## 2025-05-28 RX ADMIN — SODIUM CHLORIDE 1000 ML: 0.9 INJECTION, SOLUTION INTRAVENOUS at 01:05

## 2025-05-28 RX ADMIN — SODIUM CHLORIDE 1000 ML: 0.9 INJECTION, SOLUTION INTRAVENOUS at 12:05

## 2025-05-31 ENCOUNTER — HOSPITAL ENCOUNTER (EMERGENCY)
Facility: OTHER | Age: 25
Discharge: HOME | End: 2025-05-31
Attending: EMERGENCY MEDICINE
Payer: MEDICAID

## 2025-05-31 VITALS
TEMPERATURE: 98 F | OXYGEN SATURATION: 97 % | SYSTOLIC BLOOD PRESSURE: 120 MMHG | RESPIRATION RATE: 16 BRPM | DIASTOLIC BLOOD PRESSURE: 76 MMHG | HEIGHT: 70 IN | HEART RATE: 81 BPM | WEIGHT: 165 LBS | BODY MASS INDEX: 23.62 KG/M2

## 2025-05-31 DIAGNOSIS — K86.0 ALCOHOL-INDUCED CHRONIC PANCREATITIS: ICD-10-CM

## 2025-05-31 DIAGNOSIS — R10.13 EPIGASTRIC ABDOMINAL PAIN: Primary | ICD-10-CM

## 2025-05-31 LAB
ABSOLUTE EOSINOPHIL (OHS): 0.21 K/UL
ABSOLUTE MONOCYTE (OHS): 0.56 K/UL (ref 0.3–1)
ABSOLUTE NEUTROPHIL COUNT (OHS): 1.69 K/UL (ref 1.8–7.7)
ALBUMIN SERPL BCP-MCNC: 3.8 G/DL (ref 3.5–5.2)
ALP SERPL-CCNC: 111 UNIT/L (ref 40–150)
ALT SERPL W/O P-5'-P-CCNC: 74 UNIT/L (ref 10–44)
ANION GAP (OHS): 13 MMOL/L (ref 8–16)
AST SERPL-CCNC: 88 UNIT/L (ref 11–45)
BASOPHILS # BLD AUTO: 0.05 K/UL
BASOPHILS NFR BLD AUTO: 1 %
BILIRUB SERPL-MCNC: 0.2 MG/DL (ref 0.1–1)
BUN SERPL-MCNC: 7 MG/DL (ref 6–20)
CALCIUM SERPL-MCNC: 8.9 MG/DL (ref 8.7–10.5)
CHLORIDE SERPL-SCNC: 105 MMOL/L (ref 95–110)
CO2 SERPL-SCNC: 21 MMOL/L (ref 23–29)
CREAT SERPL-MCNC: 0.8 MG/DL (ref 0.5–1.4)
ERYTHROCYTE [DISTWIDTH] IN BLOOD BY AUTOMATED COUNT: 20.7 % (ref 11.5–14.5)
GFR SERPLBLD CREATININE-BSD FMLA CKD-EPI: >60 ML/MIN/1.73/M2
GLUCOSE SERPL-MCNC: 122 MG/DL (ref 70–110)
HCT VFR BLD AUTO: 41.1 % (ref 40–54)
HGB BLD-MCNC: 12.8 GM/DL (ref 14–18)
HOLD SPECIMEN: 1
IMM GRANULOCYTES # BLD AUTO: 0.01 K/UL (ref 0–0.04)
IMM GRANULOCYTES NFR BLD AUTO: 0.2 % (ref 0–0.5)
LIPASE SERPL-CCNC: 16 U/L (ref 4–60)
LYMPHOCYTES # BLD AUTO: 2.34 K/UL (ref 1–4.8)
MCH RBC QN AUTO: 26.3 PG (ref 27–31)
MCHC RBC AUTO-ENTMCNC: 31.1 G/DL (ref 32–36)
MCV RBC AUTO: 85 FL (ref 82–98)
NUCLEATED RBC (/100WBC) (OHS): 0 /100 WBC
PLATELET # BLD AUTO: 280 K/UL (ref 150–450)
PLATELET BLD QL SMEAR: NORMAL
PMV BLD AUTO: 9.4 FL (ref 9.2–12.9)
POTASSIUM SERPL-SCNC: 3.7 MMOL/L (ref 3.5–5.1)
PROT SERPL-MCNC: 7.9 GM/DL (ref 6–8.4)
RBC # BLD AUTO: 4.86 M/UL (ref 4.6–6.2)
RELATIVE EOSINOPHIL (OHS): 4.3 %
RELATIVE LYMPHOCYTE (OHS): 48.1 % (ref 18–48)
RELATIVE MONOCYTE (OHS): 11.5 % (ref 4–15)
RELATIVE NEUTROPHIL (OHS): 34.9 % (ref 38–73)
SODIUM SERPL-SCNC: 139 MMOL/L (ref 136–145)
WBC # BLD AUTO: 4.86 K/UL (ref 3.9–12.7)

## 2025-05-31 PROCEDURE — 80053 COMPREHEN METABOLIC PANEL: CPT | Performed by: EMERGENCY MEDICINE

## 2025-05-31 PROCEDURE — 83690 ASSAY OF LIPASE: CPT | Performed by: EMERGENCY MEDICINE

## 2025-05-31 PROCEDURE — 96376 TX/PRO/DX INJ SAME DRUG ADON: CPT

## 2025-05-31 PROCEDURE — 25000003 PHARM REV CODE 250: Performed by: EMERGENCY MEDICINE

## 2025-05-31 PROCEDURE — 63600175 PHARM REV CODE 636 W HCPCS: Performed by: EMERGENCY MEDICINE

## 2025-05-31 PROCEDURE — 85025 COMPLETE CBC W/AUTO DIFF WBC: CPT | Performed by: EMERGENCY MEDICINE

## 2025-05-31 PROCEDURE — 96375 TX/PRO/DX INJ NEW DRUG ADDON: CPT

## 2025-05-31 PROCEDURE — 99284 EMERGENCY DEPT VISIT MOD MDM: CPT | Mod: 25

## 2025-05-31 PROCEDURE — 96361 HYDRATE IV INFUSION ADD-ON: CPT

## 2025-05-31 PROCEDURE — 96365 THER/PROPH/DIAG IV INF INIT: CPT

## 2025-05-31 RX ORDER — HYDROMORPHONE HYDROCHLORIDE 1 MG/ML
1 INJECTION, SOLUTION INTRAMUSCULAR; INTRAVENOUS; SUBCUTANEOUS
Status: COMPLETED | OUTPATIENT
Start: 2025-05-31 | End: 2025-05-31

## 2025-05-31 RX ORDER — OXYCODONE AND ACETAMINOPHEN 5; 325 MG/1; MG/1
1 TABLET ORAL EVERY 6 HOURS PRN
Qty: 12 TABLET | Refills: 0 | Status: SHIPPED | OUTPATIENT
Start: 2025-05-31

## 2025-05-31 RX ORDER — OXYCODONE AND ACETAMINOPHEN 5; 325 MG/1; MG/1
1 TABLET ORAL
Refills: 0 | Status: COMPLETED | OUTPATIENT
Start: 2025-05-31 | End: 2025-05-31

## 2025-05-31 RX ADMIN — PROMETHAZINE HYDROCHLORIDE 12.5 MG: 25 INJECTION INTRAMUSCULAR; INTRAVENOUS at 07:05

## 2025-05-31 RX ADMIN — HYDROMORPHONE HYDROCHLORIDE 1 MG: 1 INJECTION, SOLUTION INTRAMUSCULAR; INTRAVENOUS; SUBCUTANEOUS at 06:05

## 2025-05-31 RX ADMIN — HYDROMORPHONE HYDROCHLORIDE 1 MG: 1 INJECTION, SOLUTION INTRAMUSCULAR; INTRAVENOUS; SUBCUTANEOUS at 08:05

## 2025-05-31 RX ADMIN — SODIUM CHLORIDE 1000 ML: 9 INJECTION, SOLUTION INTRAVENOUS at 06:05

## 2025-05-31 RX ADMIN — OXYCODONE HYDROCHLORIDE AND ACETAMINOPHEN 1 TABLET: 5; 325 TABLET ORAL at 10:05

## 2025-06-08 ENCOUNTER — HOSPITAL ENCOUNTER (EMERGENCY)
Facility: OTHER | Age: 25
Discharge: HOME | End: 2025-06-09
Attending: EMERGENCY MEDICINE
Payer: MEDICAID

## 2025-06-08 VITALS
DIASTOLIC BLOOD PRESSURE: 76 MMHG | HEIGHT: 70 IN | RESPIRATION RATE: 16 BRPM | SYSTOLIC BLOOD PRESSURE: 110 MMHG | WEIGHT: 170 LBS | TEMPERATURE: 98 F | OXYGEN SATURATION: 95 % | HEART RATE: 104 BPM | BODY MASS INDEX: 24.34 KG/M2

## 2025-06-08 DIAGNOSIS — K86.1 ACUTE ON CHRONIC PANCREATITIS: Primary | ICD-10-CM

## 2025-06-08 DIAGNOSIS — K85.90 ACUTE ON CHRONIC PANCREATITIS: Primary | ICD-10-CM

## 2025-06-08 DIAGNOSIS — R11.2 NAUSEA AND VOMITING, UNSPECIFIED VOMITING TYPE: ICD-10-CM

## 2025-06-08 LAB
ABSOLUTE EOSINOPHIL (OHS): 0.2 K/UL
ABSOLUTE MONOCYTE (OHS): 0.57 K/UL (ref 0.3–1)
ABSOLUTE NEUTROPHIL COUNT (OHS): 2.45 K/UL (ref 1.8–7.7)
ALBUMIN SERPL BCP-MCNC: 3.4 G/DL (ref 3.5–5.2)
ALP SERPL-CCNC: 94 UNIT/L (ref 40–150)
ALT SERPL W/O P-5'-P-CCNC: 62 UNIT/L (ref 10–44)
ANION GAP (OHS): 12 MMOL/L (ref 8–16)
AST SERPL-CCNC: 58 UNIT/L (ref 11–45)
BASOPHILS # BLD AUTO: 0.05 K/UL
BASOPHILS NFR BLD AUTO: 0.8 %
BILIRUB SERPL-MCNC: 0.3 MG/DL (ref 0.1–1)
BUN SERPL-MCNC: 6 MG/DL (ref 6–20)
CALCIUM SERPL-MCNC: 7.3 MG/DL (ref 8.7–10.5)
CHLORIDE SERPL-SCNC: 107 MMOL/L (ref 95–110)
CO2 SERPL-SCNC: 17 MMOL/L (ref 23–29)
CREAT SERPL-MCNC: 0.8 MG/DL (ref 0.5–1.4)
ERYTHROCYTE [DISTWIDTH] IN BLOOD BY AUTOMATED COUNT: 20.2 % (ref 11.5–14.5)
GFR SERPLBLD CREATININE-BSD FMLA CKD-EPI: >60 ML/MIN/1.73/M2
GLUCOSE SERPL-MCNC: 120 MG/DL (ref 70–110)
HCT VFR BLD AUTO: 41.6 % (ref 40–54)
HGB BLD-MCNC: 13.2 GM/DL (ref 14–18)
IMM GRANULOCYTES # BLD AUTO: 0.01 K/UL (ref 0–0.04)
IMM GRANULOCYTES NFR BLD AUTO: 0.2 % (ref 0–0.5)
LIPASE SERPL-CCNC: 16 U/L (ref 4–60)
LYMPHOCYTES # BLD AUTO: 3.23 K/UL (ref 1–4.8)
MCH RBC QN AUTO: 26.6 PG (ref 27–31)
MCHC RBC AUTO-ENTMCNC: 31.7 G/DL (ref 32–36)
MCV RBC AUTO: 84 FL (ref 82–98)
NUCLEATED RBC (/100WBC) (OHS): 0 /100 WBC
PLATELET # BLD AUTO: 284 K/UL (ref 150–450)
PMV BLD AUTO: 9.7 FL (ref 9.2–12.9)
POTASSIUM SERPL-SCNC: 3.8 MMOL/L (ref 3.5–5.1)
PROT SERPL-MCNC: 6.7 GM/DL (ref 6–8.4)
RBC # BLD AUTO: 4.97 M/UL (ref 4.6–6.2)
RELATIVE EOSINOPHIL (OHS): 3.1 %
RELATIVE LYMPHOCYTE (OHS): 49.6 % (ref 18–48)
RELATIVE MONOCYTE (OHS): 8.8 % (ref 4–15)
RELATIVE NEUTROPHIL (OHS): 37.5 % (ref 38–73)
SODIUM SERPL-SCNC: 136 MMOL/L (ref 136–145)
WBC # BLD AUTO: 6.51 K/UL (ref 3.9–12.7)

## 2025-06-08 PROCEDURE — 85025 COMPLETE CBC W/AUTO DIFF WBC: CPT | Performed by: EMERGENCY MEDICINE

## 2025-06-08 PROCEDURE — 25000003 PHARM REV CODE 250: Performed by: EMERGENCY MEDICINE

## 2025-06-08 PROCEDURE — 83690 ASSAY OF LIPASE: CPT | Performed by: EMERGENCY MEDICINE

## 2025-06-08 PROCEDURE — 96361 HYDRATE IV INFUSION ADD-ON: CPT

## 2025-06-08 PROCEDURE — 96365 THER/PROPH/DIAG IV INF INIT: CPT

## 2025-06-08 PROCEDURE — 99284 EMERGENCY DEPT VISIT MOD MDM: CPT | Mod: 25

## 2025-06-08 PROCEDURE — 96376 TX/PRO/DX INJ SAME DRUG ADON: CPT

## 2025-06-08 PROCEDURE — 63600175 PHARM REV CODE 636 W HCPCS: Performed by: EMERGENCY MEDICINE

## 2025-06-08 PROCEDURE — 96375 TX/PRO/DX INJ NEW DRUG ADDON: CPT

## 2025-06-08 PROCEDURE — 80053 COMPREHEN METABOLIC PANEL: CPT | Performed by: EMERGENCY MEDICINE

## 2025-06-08 RX ORDER — PANTOPRAZOLE SODIUM 40 MG/10ML
40 INJECTION, POWDER, LYOPHILIZED, FOR SOLUTION INTRAVENOUS
Status: COMPLETED | OUTPATIENT
Start: 2025-06-08 | End: 2025-06-08

## 2025-06-08 RX ORDER — HYDROMORPHONE HYDROCHLORIDE 1 MG/ML
0.5 INJECTION, SOLUTION INTRAMUSCULAR; INTRAVENOUS; SUBCUTANEOUS
Refills: 0 | Status: COMPLETED | OUTPATIENT
Start: 2025-06-08 | End: 2025-06-08

## 2025-06-08 RX ORDER — HYDROMORPHONE HYDROCHLORIDE 1 MG/ML
1 INJECTION, SOLUTION INTRAMUSCULAR; INTRAVENOUS; SUBCUTANEOUS
Refills: 0 | Status: COMPLETED | OUTPATIENT
Start: 2025-06-08 | End: 2025-06-08

## 2025-06-08 RX ORDER — SODIUM CHLORIDE 9 MG/ML
1000 INJECTION, SOLUTION INTRAVENOUS
Status: COMPLETED | OUTPATIENT
Start: 2025-06-08 | End: 2025-06-08

## 2025-06-08 RX ORDER — HYDROCODONE BITARTRATE AND ACETAMINOPHEN 5; 325 MG/1; MG/1
1 TABLET ORAL
Refills: 0 | Status: COMPLETED | OUTPATIENT
Start: 2025-06-09 | End: 2025-06-08

## 2025-06-08 RX ORDER — FAMOTIDINE 10 MG/ML
20 INJECTION, SOLUTION INTRAVENOUS
Status: DISCONTINUED | OUTPATIENT
Start: 2025-06-08 | End: 2025-06-08

## 2025-06-08 RX ADMIN — SODIUM CHLORIDE 1000 ML: 9 INJECTION, SOLUTION INTRAVENOUS at 09:06

## 2025-06-08 RX ADMIN — HYDROCODONE BITARTRATE AND ACETAMINOPHEN 1 TABLET: 5; 325 TABLET ORAL at 11:06

## 2025-06-08 RX ADMIN — HYDROMORPHONE HYDROCHLORIDE 0.5 MG: 1 INJECTION, SOLUTION INTRAMUSCULAR; INTRAVENOUS; SUBCUTANEOUS at 10:06

## 2025-06-08 RX ADMIN — SODIUM CHLORIDE 1000 ML: 9 INJECTION, SOLUTION INTRAVENOUS at 10:06

## 2025-06-08 RX ADMIN — HYDROMORPHONE HYDROCHLORIDE 1 MG: 1 INJECTION, SOLUTION INTRAMUSCULAR; INTRAVENOUS; SUBCUTANEOUS at 09:06

## 2025-06-08 RX ADMIN — PROMETHAZINE HYDROCHLORIDE 6.25 MG: 25 INJECTION INTRAMUSCULAR; INTRAVENOUS at 09:06

## 2025-06-08 RX ADMIN — PANTOPRAZOLE SODIUM 40 MG: 40 INJECTION, POWDER, FOR SOLUTION INTRAVENOUS at 09:06

## 2025-06-09 PROCEDURE — 25000003 PHARM REV CODE 250: Performed by: EMERGENCY MEDICINE

## 2025-06-09 NOTE — ED PROVIDER NOTES
Encounter Date: 6/8/2025       History     Chief Complaint   Patient presents with    Abdominal Pain     C/o abdominal pain and nausea x 24 hours, States feel like pancreatitis flare up, Was seen and treated 2 weeks ago for the same     25-year-old male with history of alcohol-induced chronic pancreatitis with history of Corinne-Chauhan tear  presents complaining of left upper quadrant abdominal pain for the past 24 hours with associated nausea and vomiting.  He states it feels like a pancreatitis flare up.  He last drank alcohol 2 days ago but thinks it may be from something he ate.  He does report seeing some streaks of blood in his emesis but denies any heavier bleeding or bleeding from any other site.  He denies any dizziness or lightheadedness.  He is not on any blood thinners.      Review of patient's allergies indicates:   Allergen Reactions    [place] Swelling    Droperidol Hives    Morphine     Pecan nut Swelling    Penicillins Hives     Tolerates cephalosporins    Tolerated piperacillin/tazobactam 11/2023    Sulfa (sulfonamide antibiotics) Hives    Tolonium chloride(toluidine blue-o) Hives, Itching and Rash    Toradol [ketorolac] Rash     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     Past Surgical History:   Procedure Laterality Date    ENDOSCOPIC ULTRASOUND OF UPPER GASTROINTESTINAL TRACT N/A 10/23/2023    Procedure: ULTRASOUND, UPPER GI TRACT, ENDOSCOPIC;  Surgeon: Emil Villatoro MD;  Location: [facility] ([address] [place] [address]);  Service: Endoscopy;   Laterality: N/A;    ENDOSCOPIC ULTRASOUND OF UPPER GASTROINTESTINAL TRACT N/A 7/24/2024    Procedure: ULTRASOUND, UPPER GI TRACT, ENDOSCOPIC;  Surgeon: Faustino Trujillo MD;  Location: [facility] ENDO (2ND FLR);  Service: Endoscopy;  Laterality: N/A;    ERCP N/A 10/23/2023    Procedure: ERCP (ENDOSCOPIC RETROGRADE CHOLANGIOPANCREATOGRAPHY);  Surgeon: Emil Villatoro MD;  Location: [facility] ENDO (2ND FLR);  Service: Endoscopy;  Laterality: N/A;    ESOPHAGOGASTRODUODENOSCOPY N/A 9/18/2023    Procedure: EGD (ESOPHAGOGASTRODUODENOSCOPY);  Surgeon: Kg Cleaning MD;  Location: [facility] ENDO (2ND FLR);  Service: Endoscopy;  Laterality: N/A;    ESOPHAGOGASTRODUODENOSCOPY N/A 3/8/2024    Procedure: EGD (ESOPHAGOGASTRODUODENOSCOPY);  Surgeon: Greg Love MD;  Location: [facility] ENDO (2ND FLR);  Service: Endoscopy;  Laterality: N/A;    ESOPHAGOGASTRODUODENOSCOPY N/A 7/24/2024    Procedure: EGD (ESOPHAGOGASTRODUODENOSCOPY);  Surgeon: Faustino Trujillo MD;  Location: [facility] ENDO (2ND FLR);  Service: Endoscopy;  Laterality: N/A;     Family History   Problem Relation Name Age of Onset    Pancreatitis Mother      Cancer Mother      Ovarian cancer Mother      No Known Problems Father      Cancer Brother      Breast cancer Maternal Grandmother       Social History[1]  Review of Systems    Physical Exam     Initial Vitals [06/08/25 2101]   BP Pulse Resp Temp SpO2   117/83 (!) 120 (!) 22 97.5 °F (36.4 °C) 97 %      MAP       --         Physical Exam    Constitutional: He appears well-developed and well-nourished. He is not diaphoretic. No distress.   HENT:   Head: Normocephalic and atraumatic.   Right Ear: External ear normal.   Left Ear: External ear normal.   Nose: Nose normal.   Eyes: Conjunctivae and EOM are normal. Right eye exhibits no discharge. Left eye exhibits no discharge. No scleral icterus.   Neck: Neck supple. No JVD present.   Normal range of motion.  Cardiovascular:  Normal rate, regular rhythm and normal heart sounds.      Exam reveals no friction rub.       No murmur heard.  Pulmonary/Chest: Breath sounds normal. No respiratory distress. He has no wheezes. He has no rhonchi. He has no rales.   Abdominal: Abdomen is soft. He exhibits no distension.   Epigastric and left upper quadrant tenderness to deep palpation There is no rebound and no guarding.   Musculoskeletal:         General: Normal range of motion.      Cervical back: Normal range of motion and neck supple.     Neurological: He is alert and oriented to person, place, and time. He has normal strength. GCS score is 15. GCS eye subscore is 4. GCS verbal subscore is 5. GCS motor subscore is 6.   Skin: Skin is warm.         ED Course   Procedures  Labs Reviewed   COMPREHENSIVE METABOLIC PANEL - Abnormal       Result Value    Sodium 136      Potassium 3.8      Chloride 107      CO2 17 (*)     Glucose 120 (*)     BUN 6      Creatinine 0.8      Calcium 7.3 (*)     Protein Total 6.7      Albumin 3.4 (*)     Bilirubin Total 0.3      ALP 94      AST 58 (*)     ALT 62 (*)     Anion Gap 12      eGFR >60     CBC WITH DIFFERENTIAL - Abnormal    WBC 6.51      RBC 4.97      HGB 13.2 (*)     HCT 41.6      MCV 84      MCH 26.6 (*)     MCHC 31.7 (*)     RDW 20.2 (*)     Platelet Count 284      MPV 9.7      Nucleated RBC 0      Neut % 37.5 (*)     Lymph % 49.6 (*)     Mono % 8.8      Eos % 3.1      Basophil % 0.8      Imm Grans % 0.2      Neut # 2.45      Lymph # 3.23      Mono # 0.57      Eos # 0.20      Baso # 0.05      Imm Grans # 0.01     LIPASE - Normal    Lipase Level 16     CBC W/ AUTO DIFFERENTIAL    Narrative:     The following orders were created for panel order CBC Auto Differential.  Procedure                               Abnormality         Status                     ---------                               -----------         ------                     CBC with Differential[[id number]]       Abnormal            Final result                 Please view results for these tests on the  individual orders.          Imaging Results    None          Medications   0.9% NaCl infusion (0 mLs Intravenous Stopped 6/8/25 2214)   promethazine (PHENERGAN) 6.25 mg in 0.9% NaCl 50 mL IVPB (0 mg Intravenous Stopped 6/8/25 2214)   pantoprazole injection 40 mg (40 mg Intravenous Given 6/8/25 2133)   HYDROmorphone injection 1 mg (1 mg Intravenous Given 6/8/25 2125)   HYDROmorphone injection 0.5 mg (0.5 mg Intravenous Given 6/8/25 2244)   0.9% NaCl infusion (1,000 mLs Intravenous New Bag 6/8/25 2241)     Medical Decision Making  25-year-old male presents with complaint of left upper quadrant pain with nausea and vomiting similar to his previous pancreatitis flare ups.  Triage vital signs significant for tachycardia, however, on my exam he was not tachycardic and he denies any symptoms including dizziness or lightheadedness as well as palpitations.  Abdominal exam is significant for focal left upper quadrant and epigastric tenderness to deep palpation.  Will plan for fluids, IV antiemetics and Protonix and lab work.    Lab work shows no significant abnormalities.  Patient reports symptomatic improvement after fluids and antiemetics as well as 2 doses of Dilaudid.  Nausea has resolved and he has been able tolerate a p.o. challenge.  Tachycardia has also improved significantly.  Will discharge home at this time stable condition to follow up with Gastroenterology as planned.    Amount and/or Complexity of Data Reviewed  Labs: ordered.    Risk  Prescription drug management.                                      Clinical Impression:  Final diagnoses:  [K85.90, K86.1] Acute on chronic pancreatitis (Primary)  [R11.2] Nausea and vomiting, unspecified vomiting type          ED Disposition Condition    Discharge Stable          ED Prescriptions    None       Follow-up Information       Follow up With Specialties Details Why Contact Info    Pina Jones NP Family Medicine Schedule an appointment as soon as possible for  a visit   [address] [place]  [facility] [address]  [phone]      Gastroenterology  Schedule an appointment as soon as possible for a visit             Launch MDCalc MDM  MDCalc MDM Module  Jun 08 2025 11:29 PM [Ellie Portillo]  Data:  - Test/documents/historian: 3 tests ordered  3 tests reviewed  2 external notes reviewed  Problems: acute pancreatitis (high)  Risk: HYDROmorphone injection (Parenteral controlled substances)             [1]   Social History  Tobacco Use    Smoking status: Former     Current packs/day: 0.10     Average packs/day: 0.1 packs/day for 4.4 years (0.4 ttl pk-yrs)     Types: Cigarettes     Start date: 2021    Smokeless tobacco: Never   Substance Use Topics    Alcohol use: Yes     Alcohol/week: 4.0 standard drinks of alcohol     Types: 4 Shots of liquor per week    Drug use: Yes     Types: Marijuana        Ellie Portillo MD  06/08/25 [id number]

## 2025-06-09 NOTE — ED TRIAGE NOTES
"Tasia Cardona, an 25 y.o. male presents to the ED via POV  [id number] p/w/d ambulated into assigned room 11 at his own accord without issue  C/O abd pain w/ NV over the past 24 hours  "I feel like its my pancreatitis flaring up."    *Evaluated and treated two weeks ago for the same issue.  *not actively vomiting at this time          (-)cp, sob, dizziness, weakness or D  (-)neuro deficits  (-)otc meds pta / unable to keep anything down   "

## 2025-06-11 PROCEDURE — 99285 EMERGENCY DEPT VISIT HI MDM: CPT | Mod: 25

## 2025-06-12 ENCOUNTER — HOSPITAL ENCOUNTER (EMERGENCY)
Facility: HOSPITAL | Age: 25
Discharge: HOME | End: 2025-06-12
Attending: EMERGENCY MEDICINE
Payer: MEDICAID

## 2025-06-12 VITALS
TEMPERATURE: 98 F | WEIGHT: 170 LBS | BODY MASS INDEX: 24.34 KG/M2 | HEIGHT: 70 IN | DIASTOLIC BLOOD PRESSURE: 72 MMHG | RESPIRATION RATE: 20 BRPM | OXYGEN SATURATION: 96 % | SYSTOLIC BLOOD PRESSURE: 119 MMHG | HEART RATE: 85 BPM

## 2025-06-12 DIAGNOSIS — R10.9 ABDOMINAL PAIN: ICD-10-CM

## 2025-06-12 DIAGNOSIS — R07.9 CHEST PAIN: ICD-10-CM

## 2025-06-12 DIAGNOSIS — R10.9 ABDOMINAL PAIN, UNSPECIFIED ABDOMINAL LOCATION: ICD-10-CM

## 2025-06-12 DIAGNOSIS — R11.2 NAUSEA AND VOMITING, UNSPECIFIED VOMITING TYPE: Primary | ICD-10-CM

## 2025-06-12 LAB
ABSOLUTE EOSINOPHIL (OHS): 0.22 K/UL
ABSOLUTE MONOCYTE (OHS): 0.97 K/UL (ref 0.3–1)
ABSOLUTE NEUTROPHIL COUNT (OHS): 2.67 K/UL (ref 1.8–7.7)
ALBUMIN SERPL BCP-MCNC: 4.3 G/DL (ref 3.5–5.2)
ALP SERPL-CCNC: 115 UNIT/L (ref 40–150)
ALT SERPL W/O P-5'-P-CCNC: 53 UNIT/L (ref 10–44)
ANION GAP (OHS): 16 MMOL/L (ref 8–16)
AST SERPL-CCNC: 59 UNIT/L (ref 11–45)
BASOPHILS # BLD AUTO: 0.04 K/UL
BASOPHILS NFR BLD AUTO: 0.5 %
BILIRUB SERPL-MCNC: 0.2 MG/DL (ref 0.1–1)
BUN SERPL-MCNC: 5 MG/DL (ref 6–20)
CALCIUM SERPL-MCNC: 8.5 MG/DL (ref 8.7–10.5)
CHLORIDE SERPL-SCNC: 103 MMOL/L (ref 95–110)
CO2 SERPL-SCNC: 19 MMOL/L (ref 23–29)
CREAT SERPL-MCNC: 1 MG/DL (ref 0.5–1.4)
ERYTHROCYTE [DISTWIDTH] IN BLOOD BY AUTOMATED COUNT: 21.2 % (ref 11.5–14.5)
GFR SERPLBLD CREATININE-BSD FMLA CKD-EPI: >60 ML/MIN/1.73/M2
GLUCOSE SERPL-MCNC: 124 MG/DL (ref 70–110)
HCT VFR BLD AUTO: 44.3 % (ref 40–54)
HGB BLD-MCNC: 13.6 GM/DL (ref 14–18)
IMM GRANULOCYTES # BLD AUTO: 0.03 K/UL (ref 0–0.04)
IMM GRANULOCYTES NFR BLD AUTO: 0.4 % (ref 0–0.5)
LIPASE SERPL-CCNC: 13 U/L (ref 4–60)
LYMPHOCYTES # BLD AUTO: 3.54 K/UL (ref 1–4.8)
MCH RBC QN AUTO: 25.8 PG (ref 27–31)
MCHC RBC AUTO-ENTMCNC: 30.7 G/DL (ref 32–36)
MCV RBC AUTO: 84 FL (ref 82–98)
NUCLEATED RBC (/100WBC) (OHS): 0 /100 WBC
OHS QRS DURATION: 86 MS
OHS QTC CALCULATION: 453 MS
PLATELET # BLD AUTO: 333 K/UL (ref 150–450)
PMV BLD AUTO: 10.3 FL (ref 9.2–12.9)
POTASSIUM SERPL-SCNC: 4.7 MMOL/L (ref 3.5–5.1)
PROT SERPL-MCNC: 8.3 GM/DL (ref 6–8.4)
RBC # BLD AUTO: 5.27 M/UL (ref 4.6–6.2)
RELATIVE EOSINOPHIL (OHS): 2.9 %
RELATIVE LYMPHOCYTE (OHS): 47.4 % (ref 18–48)
RELATIVE MONOCYTE (OHS): 13 % (ref 4–15)
RELATIVE NEUTROPHIL (OHS): 35.8 % (ref 38–73)
SODIUM SERPL-SCNC: 138 MMOL/L (ref 136–145)
TROPONIN I SERPL HS-MCNC: <3 NG/L
WBC # BLD AUTO: 7.47 K/UL (ref 3.9–12.7)

## 2025-06-12 PROCEDURE — 96365 THER/PROPH/DIAG IV INF INIT: CPT | Mod: 59

## 2025-06-12 PROCEDURE — 25000003 PHARM REV CODE 250: Performed by: PHYSICIAN ASSISTANT

## 2025-06-12 PROCEDURE — 84484 ASSAY OF TROPONIN QUANT: CPT | Performed by: PHYSICIAN ASSISTANT

## 2025-06-12 PROCEDURE — 96375 TX/PRO/DX INJ NEW DRUG ADDON: CPT

## 2025-06-12 PROCEDURE — 83690 ASSAY OF LIPASE: CPT | Performed by: PHYSICIAN ASSISTANT

## 2025-06-12 PROCEDURE — 63600175 PHARM REV CODE 636 W HCPCS: Performed by: EMERGENCY MEDICINE

## 2025-06-12 PROCEDURE — 25000003 PHARM REV CODE 250: Performed by: EMERGENCY MEDICINE

## 2025-06-12 PROCEDURE — 25500020 PHARM REV CODE 255: Performed by: PHYSICIAN ASSISTANT

## 2025-06-12 PROCEDURE — 96376 TX/PRO/DX INJ SAME DRUG ADON: CPT

## 2025-06-12 PROCEDURE — 93005 ELECTROCARDIOGRAM TRACING: CPT

## 2025-06-12 PROCEDURE — 63600175 PHARM REV CODE 636 W HCPCS: Performed by: PHYSICIAN ASSISTANT

## 2025-06-12 PROCEDURE — 80053 COMPREHEN METABOLIC PANEL: CPT | Performed by: PHYSICIAN ASSISTANT

## 2025-06-12 PROCEDURE — 85025 COMPLETE CBC W/AUTO DIFF WBC: CPT | Performed by: PHYSICIAN ASSISTANT

## 2025-06-12 PROCEDURE — 93010 ELECTROCARDIOGRAM REPORT: CPT | Mod: ,,, | Performed by: INTERNAL MEDICINE

## 2025-06-12 PROCEDURE — 96361 HYDRATE IV INFUSION ADD-ON: CPT

## 2025-06-12 RX ORDER — HYDROMORPHONE HYDROCHLORIDE 1 MG/ML
1 INJECTION, SOLUTION INTRAMUSCULAR; INTRAVENOUS; SUBCUTANEOUS
Refills: 0 | Status: COMPLETED | OUTPATIENT
Start: 2025-06-12 | End: 2025-06-12

## 2025-06-12 RX ORDER — LIDOCAINE HYDROCHLORIDE 20 MG/ML
15 SOLUTION OROPHARYNGEAL ONCE
Status: DISCONTINUED | OUTPATIENT
Start: 2025-06-12 | End: 2025-06-12 | Stop reason: HOSPADM

## 2025-06-12 RX ORDER — METOCLOPRAMIDE HYDROCHLORIDE 5 MG/ML
10 INJECTION INTRAMUSCULAR; INTRAVENOUS
Status: COMPLETED | OUTPATIENT
Start: 2025-06-12 | End: 2025-06-12

## 2025-06-12 RX ORDER — SODIUM CHLORIDE 9 MG/ML
500 INJECTION, SOLUTION INTRAVENOUS
Status: COMPLETED | OUTPATIENT
Start: 2025-06-12 | End: 2025-06-12

## 2025-06-12 RX ORDER — ALUMINUM HYDROXIDE, MAGNESIUM HYDROXIDE, AND SIMETHICONE 1200; 120; 1200 MG/30ML; MG/30ML; MG/30ML
30 SUSPENSION ORAL ONCE
Status: DISCONTINUED | OUTPATIENT
Start: 2025-06-12 | End: 2025-06-12 | Stop reason: HOSPADM

## 2025-06-12 RX ADMIN — PROMETHAZINE HYDROCHLORIDE 12.5 MG: 25 INJECTION INTRAMUSCULAR; INTRAVENOUS at 04:06

## 2025-06-12 RX ADMIN — HYDROMORPHONE HYDROCHLORIDE 1 MG: 1 INJECTION, SOLUTION INTRAMUSCULAR; INTRAVENOUS; SUBCUTANEOUS at 03:06

## 2025-06-12 RX ADMIN — METOCLOPRAMIDE 10 MG: 5 INJECTION, SOLUTION INTRAMUSCULAR; INTRAVENOUS at 01:06

## 2025-06-12 RX ADMIN — SODIUM CHLORIDE 1000 ML: 9 INJECTION, SOLUTION INTRAVENOUS at 01:06

## 2025-06-12 RX ADMIN — IOHEXOL 100 ML: 350 INJECTION, SOLUTION INTRAVENOUS at 03:06

## 2025-06-12 RX ADMIN — HYDROMORPHONE HYDROCHLORIDE 1 MG: 1 INJECTION, SOLUTION INTRAMUSCULAR; INTRAVENOUS; SUBCUTANEOUS at 01:06

## 2025-06-12 RX ADMIN — SODIUM CHLORIDE 500 ML: 9 INJECTION, SOLUTION INTRAVENOUS at 03:06

## 2025-06-12 NOTE — PROVIDER PROGRESS NOTES - EMERGENCY DEPT.
Imaging Results               CT Abdomen Pelvis With IV Contrast NO Oral Contrast (Final result)  Result time 06/12/25 04:43:14      Final result by Rafita Pitt MD (06/12/25 04:43:14)                   Impression:      1. Diffusely fluid-filled small bowel with associated mucosal hyperenhancement as well as mucosal hyperenhancement the stomach.  Findings likely relate to a nonspecific infectious or non-infectious inflammatory gastroenteritis.  Correlation advised.  2. Near complete resolution of peripancreatic fat stranding with stable fluid collection along the anterior aspect of the body of the pancreas.  3. Similar appearance of suspected remote splenic vein thrombosis with left upper abdominal venous collaterals.  4. Additional findings as above.  This report was flagged in Epic as abnormal.    Electronically signed by resident: Darien Sprague  Date:    06/12/2025  Time:    04:14    Electronically signed by: Rafita Pitt  Date:    06/12/2025  Time:    04:43               Narrative:    EXAMINATION:  CT ABDOMEN PELVIS WITH IV CONTRAST    CLINICAL HISTORY:  Epigastric pain    TECHNIQUE:  Low dose axial images, sagittal and coronal reformations were obtained from the lung bases to the pubic symphysis following the IV administration of 100 mL of Omnipaque 350 .  Oral contrast was not given.  Single phase postcontrast CT examination of the abdomen and pelvis is submitted.    COMPARISON:  CT abdomen pelvis 05/06/2025    FINDINGS:  SOFT TISSUES: Unremarkable.    LUNG BASES/VISUALIZED MEDIASTINUM: Unremarkable.    HEPATOBILIARY: Liver is enlarged measuring 22 cm craniocaudal.  Diffuse hepatic parenchymal hypoattenuation consistent with steatosis.  No focal hepatic lesions.  No biliary ductal dilatation.  Normal gallbladder.    PANCREAS: Improved peripancreatic fat stranding with minimal residual stranding near the pancreatic body adjacent to the fluid collection along the anterior aspect of the body of the  pancreas, which is grossly stable in size measuring 1.5 cm, previously 1.5 cm.  No new or worsening peripancreatic fluid collection.  No ductal dilatation.    SPLEEN: Enlarged measuring 15 cm.    ADRENALS: No adrenal nodules.    KIDNEYS/URETERS: Kidneys are normal size and enhance symmetrically.  No nephrolithiasis or hydronephrosis. No solid mass lesions. Ureters are unremarkable.    BLADDER/PELVIC ORGANS: Unremarkable.    PERITONEUM / RETROPERITONEUM: No free air or fluid.    LYMPH NODES: No lymphadenopathy.    VESSELS: No aortic aneurysm.  Major aortic branch vessels are patent.  Main portal vein and SMV are patent.  Similar attenuation and narrowing of the splenic vein at the portal confluence which may relate to remote splenic vein thrombosis.  Similar left upper quadrant venous collaterals.  Retroaortic left renal vein.    GI TRACT: Mild mucosal hyperenhancement of the stomach without focal wall thickening.  Diffusely fluid-filled small bowel with mucosal hyperenhancement.  No significant small bowel wall thickening.  No colonic distention or wall thickening.  Normal appendix.    BONES: No acute fractures or suspicious osseous lesions.                                       X-Ray Chest AP Portable (Preliminary result)  Result time 06/12/25 01:58:06      Preliminary result by Darien Sprague MD (06/12/25 01:58:06)                   Initial Result:    EXAMINATION:  XR CHEST AP PORTABLE    CLINICAL HISTORY:  Chest pain, unspecified    TECHNIQUE:  Single frontal view of the chest was performed.    COMPARISON:  Radiograph 04/04/2025    FINDINGS:  Lungs are symmetrically expanded. No focal consolidation, pleural   effusion, or pneumothorax.    Cardiac silhouette is normal size.Hilar and mediastinal contours are   unremarkable.    Bones are intact.No free air under the diaphragms.    Impression:    No acute abnormality.    Electronically signed by resident: Darien Sprague  Date:    06/12/2025  Time:    01:54                                      Patient is signed out pending troponin and CT abdomen pelvis.  CT shows signs of gastroenteritis, no inflammatory changes about pancreas.  He has a well-known stable splenic vein thrombosis, not anticoagulated.  No further GI bleeding, suspect Corinne-Chauhan tear from his blood-tinged emesis reported.  His abdominal examination is benign.  He is tolerating p.o. without difficulty.  Pain is well-controlled.  He is stable for discharge home with outpatient follow up.

## 2025-06-12 NOTE — ED TRIAGE NOTES
Tasia Cardona, a 25 y.o. male presents to the ED w/ complaint of abdominal pain with N/V no diarrhea, states he has pancreatitis and it feels like he is having a flare.    Triage note:  Chief Complaint   Patient presents with    Abdominal Pain     Intermittent abd pain for the past week h/o pancreatitis.     Review of patient's allergies indicates:   Allergen Reactions    [place] Swelling    Droperidol Hives    Morphine     Pecan nut Swelling    Penicillins Hives     Tolerates cephalosporins    Tolerated piperacillin/tazobactam 11/2023    Sulfa (sulfonamide antibiotics) Hives    Tolonium chloride(toluidine blue-o) Hives, Itching and Rash    Toradol [ketorolac] Rash     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    LOC: The patient is awake, alert and aware of environment with an appropriate affect, the patient is oriented x 4 and speaking appropriately.    APPEARANCE: Patient resting comfortably and in no acute distress, patient is clean and well groomed, patient's clothing is properly fastened.    SKIN: The skin is warm and dry, color consistent with ethnicity, patient has normal skin turgor and moist mucus membranes, skin intact, no breakdown or bruising noted.    MUSCULOSKELETAL: Patient moving all extremities well, no obvious swelling or deformities noted.    RESPIRATORY: Airway is open and patent, respirations are spontaneous and even, patient has a normal effort and  rate.    CARDIAC: Patient has a normal rate and rhythm, no periphreal edema noted, capillary refill < 3 seconds.    ABDOMEN: Pt complains of abdominal pain, no distention noted. Patient has nausea, vomiting, with no diarrhea, or constipation.     NEUROLOGIC: Eyes open spontaneously, PERRL, behavior appropriate to situation, follows commands, facial expression symmetrical, bilateral hand grasp equal and even, purposeful motor response noted, normal sensation in all extremities.     HEENT: No abnormalities noted. White sclera and pupils equal round and reactive to light. Denies headache, dizziness.     : Pt voids independently, denies dysuria, hematuria, frequency.

## 2025-06-12 NOTE — ED PROVIDER NOTES
Encounter Date: 6/11/2025       History     Chief Complaint   Patient presents with    Abdominal Pain     Intermittent abd pain for the past week h/o pancreatitis.     25-year-old male with a PMHx of HIV, alcohol-induced chronic pancreatitis with history of Corinne-Chauhan tear presents with epigastric and left upper quadrant abdominal pain x2-3 days.  He has associated nausea, vomiting, diarrhea.  He noted a small amount of blood-streaked into his vomit today.  He notes slight chest discomfort which he attributes to vomiting. He denies fever, melena, bloody stool, urinary symptoms.    The history is provided by the patient.     Review of patient's allergies indicates:   Allergen Reactions    [place] Swelling    Droperidol Hives    Morphine     Pecan nut Swelling    Penicillins Hives     Tolerates cephalosporins    Tolerated piperacillin/tazobactam 11/2023    Sulfa (sulfonamide antibiotics) Hives    Tolonium chloride(toluidine blue-o) Hives, Itching and Rash    Toradol [ketorolac] Rash     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     Past Surgical History:   Procedure Laterality Date    ENDOSCOPIC ULTRASOUND OF UPPER GASTROINTESTINAL TRACT N/A 10/23/2023    Procedure: ULTRASOUND, UPPER GI TRACT, ENDOSCOPIC;  Surgeon: Emil Villatoro MD;  Location: [address]);  Service: Endoscopy;  Laterality: N/A;    ENDOSCOPIC ULTRASOUND OF UPPER GASTROINTESTINAL TRACT N/A 7/24/2024    Procedure: ULTRASOUND, UPPER GI TRACT, ENDOSCOPIC;  Surgeon: Faustino Trujillo  MD JOSH;  Location: [facility] ENDO (2ND FLR);  Service: Endoscopy;  Laterality: N/A;    ERCP N/A 10/23/2023    Procedure: ERCP (ENDOSCOPIC RETROGRADE CHOLANGIOPANCREATOGRAPHY);  Surgeon: Emil Villatoro MD;  Location: [facility] ENDO (2ND FLR);  Service: Endoscopy;  Laterality: N/A;    ESOPHAGOGASTRODUODENOSCOPY N/A 9/18/2023    Procedure: EGD (ESOPHAGOGASTRODUODENOSCOPY);  Surgeon: Kg Cleaning MD;  Location: [facility] (2ND FLR);  Service: Endoscopy;  Laterality: N/A;    ESOPHAGOGASTRODUODENOSCOPY N/A 3/8/2024    Procedure: EGD (ESOPHAGOGASTRODUODENOSCOPY);  Surgeon: Greg Love MD;  Location: [facility] ENDO (2ND FLR);  Service: Endoscopy;  Laterality: N/A;    ESOPHAGOGASTRODUODENOSCOPY N/A 7/24/2024    Procedure: EGD (ESOPHAGOGASTRODUODENOSCOPY);  Surgeon: Faustino Trujillo MD;  Location: [facility] (2ND FLR);  Service: Endoscopy;  Laterality: N/A;     Family History   Problem Relation Name Age of Onset    Pancreatitis Mother      Cancer Mother      Ovarian cancer Mother      No Known Problems Father      Cancer Brother      Breast cancer Maternal Grandmother       Social History[1]  Review of Systems    Physical Exam     Initial Vitals [06/11/25 2347]   BP Pulse Resp Temp SpO2   121/84 (!) 117 18 97.9 °F (36.6 °C) 99 %      MAP       --         Physical Exam    Nursing note and vitals reviewed.  Constitutional: He appears well-developed and well-nourished. He is not diaphoretic. No distress.   HENT:   Head: Normocephalic and atraumatic.   Nose: Nose normal.   Eyes: Conjunctivae and EOM are normal.   Neck: Neck supple.   Cardiovascular:  Normal rate.           Pulmonary/Chest: No respiratory distress.   Abdominal: There is abdominal tenderness in the epigastric area, left upper quadrant and left lower quadrant. There is guarding.   Musculoskeletal:      Cervical back: Neck supple.     Neurological: He is alert and oriented to person, place, and time.   Skin: No rash noted.   Psychiatric: He has a normal mood and  affect. Thought content normal.         ED Course   Procedures  Labs Reviewed   COMPREHENSIVE METABOLIC PANEL - Abnormal       Result Value    Sodium 138      Potassium 4.7      Chloride 103      CO2 19 (*)     Glucose 124 (*)     BUN 5 (*)     Creatinine 1.0      Calcium 8.5 (*)     Protein Total 8.3      Albumin 4.3      Bilirubin Total 0.2            AST 59 (*)     ALT 53 (*)     Anion Gap 16      eGFR >60     CBC WITH DIFFERENTIAL - Abnormal    WBC 7.47      RBC 5.27      HGB 13.6 (*)     HCT 44.3      MCV 84      MCH 25.8 (*)     MCHC 30.7 (*)     RDW 21.2 (*)     Platelet Count 333      MPV 10.3      Nucleated RBC 0      Neut % 35.8 (*)     Lymph % 47.4      Mono % 13.0      Eos % 2.9      Basophil % 0.5      Imm Grans % 0.4      Neut # 2.67      Lymph # 3.54      Mono # 0.97      Eos # 0.22      Baso # 0.04      Imm Grans # 0.03     LIPASE - Normal    Lipase Level 13     CBC W/ AUTO DIFFERENTIAL    Narrative:     The following orders were created for panel order CBC W/ AUTO DIFFERENTIAL.  Procedure                               Abnormality         Status                     ---------                               -----------         ------                     CBC with Differential[[id number]]       Abnormal            Final result                 Please view results for these tests on the individual orders.   URINALYSIS, REFLEX TO URINE CULTURE   TROPONIN I HIGH SENSITIVITY     EKG Readings: (Independently Interpreted)   Initial Reading: No STEMI. Rhythm: Sinus Tachycardia. Heart Rate: 103. Clinical Impression: Sinus Tachycardia   Changes noted in V2       Imaging Results    None          Medications   sodium chloride 0.9% bolus 1,000 mL 1,000 mL (1,000 mLs Intravenous New Bag 6/12/25 0114)   metoclopramide injection 10 mg (10 mg Intravenous Given 6/12/25 0114)   HYDROmorphone injection 1 mg (1 mg Intravenous Given 6/12/25 0115)     Medical Decision Making  25-year-old male with a PMHx of HIV,  alcohol-induced chronic pancreatitis with history of Corinne-Chauhan tear presents with epigastric and left upper quadrant abdominal pain x2-3 days. Nontoxic appearing.  Vitals with tachycardia. Afebrile. Exam as above. I will initiate workup and reassess.    Ddx:  Acute on chronic pancreatitis, chronic pain, gastritis, gastroenteritis, Corinne-Chauhan    Labs without leukocytosis.  LFTs are elevated however stable.  Lipase within normal limits.  Normal bili.    I will sign out this time to oncoming ED provider due to shift change.  Pending clinical improvement, troponin, and imaging at this time.      Amount and/or Complexity of Data Reviewed  Labs: ordered. Decision-making details documented in ED Course.  Radiology: ordered.    Risk  Prescription drug management.                 ED Course as of 06/12/25 0145   u Jun 12, 2025   0140 AST(!): 59  Stable [HM]   0140 ALT(!): 53  Stable [HM]   0140 Calcium(!): 8.5 [HM]   0141 WBC: 7.47 [HM]   0141 Lipase: 13 [HM]      ED Course User Index  [HM] Remedios Russ PA-C                           Clinical Impression:  Final diagnoses:  [R11.2] Nausea and vomiting, unspecified vomiting type (Primary)  [R10.9] Abdominal pain, unspecified abdominal location  [R10.9] Abdominal pain  [R07.9] Chest pain                       [1]   Social History  Tobacco Use    Smoking status: Former     Current packs/day: 0.10     Average packs/day: 0.1 packs/day for 4.4 years (0.4 ttl pk-yrs)     Types: Cigarettes     Start date: 2021    Smokeless tobacco: Never   Substance Use Topics    Alcohol use: Yes     Alcohol/week: 4.0 standard drinks of alcohol     Types: 4 Shots of liquor per week    Drug use: Yes     Types: Marijuana        Remedios Russ PA-C  06/12/25 0145

## 2025-06-15 ENCOUNTER — HOSPITAL ENCOUNTER (EMERGENCY)
Facility: HOSPITAL | Age: 25
Discharge: HOME | End: 2025-06-16
Attending: EMERGENCY MEDICINE
Payer: MEDICAID

## 2025-06-15 DIAGNOSIS — R10.13 EPIGASTRIC PAIN: ICD-10-CM

## 2025-06-15 DIAGNOSIS — K29.70 GASTRITIS, PRESENCE OF BLEEDING UNSPECIFIED, UNSPECIFIED CHRONICITY, UNSPECIFIED GASTRITIS TYPE: Primary | ICD-10-CM

## 2025-06-15 LAB
ABSOLUTE EOSINOPHIL (OHS): 0.28 K/UL
ABSOLUTE MONOCYTE (OHS): 0.76 K/UL (ref 0.3–1)
ABSOLUTE NEUTROPHIL COUNT (OHS): 2.49 K/UL (ref 1.8–7.7)
ALBUMIN SERPL BCP-MCNC: 4.7 G/DL (ref 3.5–5.2)
ALP SERPL-CCNC: 107 UNIT/L (ref 40–150)
ALT SERPL W/O P-5'-P-CCNC: 53 UNIT/L (ref 10–44)
ANION GAP (OHS): 15 MMOL/L (ref 8–16)
AST SERPL-CCNC: 50 UNIT/L (ref 11–45)
BASOPHILS # BLD AUTO: 0.07 K/UL
BASOPHILS NFR BLD AUTO: 1.1 %
BILIRUB SERPL-MCNC: 0.7 MG/DL (ref 0.1–1)
BUN SERPL-MCNC: 7 MG/DL (ref 6–30)
BUN SERPL-MCNC: 8 MG/DL (ref 6–20)
CALCIUM SERPL-MCNC: 9.3 MG/DL (ref 8.7–10.5)
CHLORIDE SERPL-SCNC: 103 MMOL/L (ref 95–110)
CHLORIDE SERPL-SCNC: 105 MMOL/L (ref 95–110)
CO2 SERPL-SCNC: 17 MMOL/L (ref 23–29)
CREAT SERPL-MCNC: 0.6 MG/DL (ref 0.5–1.4)
CREAT SERPL-MCNC: 0.8 MG/DL (ref 0.5–1.4)
ERYTHROCYTE [DISTWIDTH] IN BLOOD BY AUTOMATED COUNT: 20.8 % (ref 11.5–14.5)
GFR SERPLBLD CREATININE-BSD FMLA CKD-EPI: >60 ML/MIN/1.73/M2
GLUCOSE SERPL-MCNC: 80 MG/DL (ref 70–110)
GLUCOSE SERPL-MCNC: 84 MG/DL (ref 70–110)
HCT VFR BLD AUTO: 44.9 % (ref 40–54)
HCT VFR BLD CALC: 47 %PCV (ref 36–54)
HGB BLD-MCNC: 13.9 GM/DL (ref 14–18)
IMM GRANULOCYTES # BLD AUTO: 0.02 K/UL (ref 0–0.04)
IMM GRANULOCYTES NFR BLD AUTO: 0.3 % (ref 0–0.5)
LIPASE SERPL-CCNC: 11 U/L (ref 4–60)
LYMPHOCYTES # BLD AUTO: 2.64 K/UL (ref 1–4.8)
MCH RBC QN AUTO: 26.5 PG (ref 27–31)
MCHC RBC AUTO-ENTMCNC: 31 G/DL (ref 32–36)
MCV RBC AUTO: 86 FL (ref 82–98)
NUCLEATED RBC (/100WBC) (OHS): 0 /100 WBC
PLATELET # BLD AUTO: 224 K/UL (ref 150–450)
PMV BLD AUTO: 9.6 FL (ref 9.2–12.9)
POC IONIZED CALCIUM: 1.11 MMOL/L (ref 1.06–1.42)
POC TCO2 (MEASURED): 19 MMOL/L (ref 23–29)
POTASSIUM BLD-SCNC: 3.7 MMOL/L (ref 3.5–5.1)
POTASSIUM SERPL-SCNC: 3.8 MMOL/L (ref 3.5–5.1)
PROT SERPL-MCNC: 8.1 GM/DL (ref 6–8.4)
RBC # BLD AUTO: 5.24 M/UL (ref 4.6–6.2)
RELATIVE EOSINOPHIL (OHS): 4.5 %
RELATIVE LYMPHOCYTE (OHS): 42.2 % (ref 18–48)
RELATIVE MONOCYTE (OHS): 12.1 % (ref 4–15)
RELATIVE NEUTROPHIL (OHS): 39.8 % (ref 38–73)
SAMPLE: ABNORMAL
SODIUM BLD-SCNC: 137 MMOL/L (ref 136–145)
SODIUM SERPL-SCNC: 137 MMOL/L (ref 136–145)
WBC # BLD AUTO: 6.26 K/UL (ref 3.9–12.7)

## 2025-06-15 PROCEDURE — 83690 ASSAY OF LIPASE: CPT | Performed by: EMERGENCY MEDICINE

## 2025-06-15 PROCEDURE — 82040 ASSAY OF SERUM ALBUMIN: CPT | Performed by: EMERGENCY MEDICINE

## 2025-06-15 PROCEDURE — 96375 TX/PRO/DX INJ NEW DRUG ADDON: CPT

## 2025-06-15 PROCEDURE — 96361 HYDRATE IV INFUSION ADD-ON: CPT

## 2025-06-15 PROCEDURE — 85025 COMPLETE CBC W/AUTO DIFF WBC: CPT | Performed by: EMERGENCY MEDICINE

## 2025-06-15 PROCEDURE — 25000003 PHARM REV CODE 250: Performed by: EMERGENCY MEDICINE

## 2025-06-15 PROCEDURE — 80047 BASIC METABLC PNL IONIZED CA: CPT

## 2025-06-15 PROCEDURE — 63600175 PHARM REV CODE 636 W HCPCS: Performed by: EMERGENCY MEDICINE

## 2025-06-15 PROCEDURE — 96374 THER/PROPH/DIAG INJ IV PUSH: CPT

## 2025-06-15 PROCEDURE — 99284 EMERGENCY DEPT VISIT MOD MDM: CPT | Mod: 25

## 2025-06-15 RX ORDER — DIPHENHYDRAMINE HYDROCHLORIDE 50 MG/ML
25 INJECTION, SOLUTION INTRAMUSCULAR; INTRAVENOUS
Status: COMPLETED | OUTPATIENT
Start: 2025-06-15 | End: 2025-06-15

## 2025-06-15 RX ORDER — HYDROMORPHONE HYDROCHLORIDE 1 MG/ML
1 INJECTION, SOLUTION INTRAMUSCULAR; INTRAVENOUS; SUBCUTANEOUS
Refills: 0 | Status: COMPLETED | OUTPATIENT
Start: 2025-06-15 | End: 2025-06-15

## 2025-06-15 RX ORDER — ONDANSETRON HYDROCHLORIDE 2 MG/ML
4 INJECTION, SOLUTION INTRAVENOUS
Status: COMPLETED | OUTPATIENT
Start: 2025-06-15 | End: 2025-06-15

## 2025-06-15 RX ADMIN — DIPHENHYDRAMINE HYDROCHLORIDE 25 MG: 50 INJECTION, SOLUTION INTRAMUSCULAR; INTRAVENOUS at 11:06

## 2025-06-15 RX ADMIN — HYDROMORPHONE HYDROCHLORIDE 1 MG: 1 INJECTION, SOLUTION INTRAMUSCULAR; INTRAVENOUS; SUBCUTANEOUS at 11:06

## 2025-06-15 RX ADMIN — ONDANSETRON 4 MG: 2 INJECTION INTRAMUSCULAR; INTRAVENOUS at 11:06

## 2025-06-15 RX ADMIN — SODIUM CHLORIDE 1000 ML: 9 INJECTION, SOLUTION INTRAVENOUS at 11:06

## 2025-06-16 VITALS
HEIGHT: 70 IN | WEIGHT: 170 LBS | SYSTOLIC BLOOD PRESSURE: 129 MMHG | TEMPERATURE: 98 F | DIASTOLIC BLOOD PRESSURE: 83 MMHG | OXYGEN SATURATION: 98 % | BODY MASS INDEX: 24.34 KG/M2 | RESPIRATION RATE: 16 BRPM | HEART RATE: 96 BPM

## 2025-06-16 PROCEDURE — 96376 TX/PRO/DX INJ SAME DRUG ADON: CPT

## 2025-06-16 PROCEDURE — 63600175 PHARM REV CODE 636 W HCPCS: Performed by: EMERGENCY MEDICINE

## 2025-06-16 RX ORDER — DIPHENHYDRAMINE HYDROCHLORIDE 50 MG/ML
12.5 INJECTION, SOLUTION INTRAMUSCULAR; INTRAVENOUS
Status: COMPLETED | OUTPATIENT
Start: 2025-06-16 | End: 2025-06-16

## 2025-06-16 RX ORDER — HYDROMORPHONE HYDROCHLORIDE 1 MG/ML
1 INJECTION, SOLUTION INTRAMUSCULAR; INTRAVENOUS; SUBCUTANEOUS
Refills: 0 | Status: COMPLETED | OUTPATIENT
Start: 2025-06-16 | End: 2025-06-16

## 2025-06-16 RX ADMIN — DIPHENHYDRAMINE HYDROCHLORIDE 12.5 MG: 50 INJECTION, SOLUTION INTRAMUSCULAR; INTRAVENOUS at 12:06

## 2025-06-16 RX ADMIN — HYDROMORPHONE HYDROCHLORIDE 1 MG: 1 INJECTION, SOLUTION INTRAMUSCULAR; INTRAVENOUS; SUBCUTANEOUS at 12:06

## 2025-06-16 NOTE — ED PROVIDER NOTES
Encounter Date: 6/15/2025       History     Chief Complaint   Patient presents with    Abdominal Pain    Vomiting    Weakness     Vomiting and pain. Had pancreatitis flare this past week.      HPI  26yo M with a PMH of pancreatitis, HIV (well controlled), brandy chauhan tears and esophagitis who presents with abdominal pain. Reports he ate a hamburger/large greasy meal last night and it triggered it. Has not had alcohol in a while. Pain is in the epigastrum and LUQ and feels like pancreatitis.  + vomiting - small streaks of blood but no clots or large volume. Had some diarrhea earlier but no black or bloody stools. No fevers. No urinary sx. No chest pain  Review of patient's allergies indicates:   Allergen Reactions    [place] Swelling    Droperidol Hives    Morphine     Pecan nut Swelling    Penicillins Hives     Tolerates cephalosporins    Tolerated piperacillin/tazobactam 11/2023    Sulfa (sulfonamide antibiotics) Hives    Tolonium chloride(toluidine blue-o) Hives, Itching and Rash    Toradol [ketorolac] Rash     Past Medical History:   Diagnosis Date    Acute necrotizing pancreatitis 09/20/2023    Alcohol use disorder 10/05/2023    Asthma     Chronic pancreatitis 09/20/2023    Hepatitis C antibody positive in blood 09/18/2023 9/2023 PCR negative    HIV infection 10/2021    Brandy-Chauhan tear 09/18/2023    Normocytic anemia 09/18/2023    Pancreatic pseudocyst/cyst 10/24/2023    Polycythemia vera 11/28/2021    Receives phlebotomy if Hct>45    Positive CMV IgG serology 09/21/2023    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     Past Surgical History:   Procedure Laterality Date    ENDOSCOPIC ULTRASOUND OF UPPER GASTROINTESTINAL TRACT N/A 10/23/2023    Procedure: ULTRASOUND, UPPER GI TRACT, ENDOSCOPIC;  Surgeon: Emil Villatoro MD;  Location: [address]);  Service: Endoscopy;  Laterality: N/A;    ENDOSCOPIC ULTRASOUND OF UPPER GASTROINTESTINAL TRACT N/A 7/24/2024    Procedure: ULTRASOUND, UPPER GI TRACT,  ENDOSCOPIC;  Surgeon: Faustino Trujillo MD;  Location: [facility] ENDO (2ND FLR);  Service: Endoscopy;  Laterality: N/A;    ERCP N/A 10/23/2023    Procedure: ERCP (ENDOSCOPIC RETROGRADE CHOLANGIOPANCREATOGRAPHY);  Surgeon: Emil Villatoro MD;  Location: [facility] ENDO (2ND FLR);  Service: Endoscopy;  Laterality: N/A;    ESOPHAGOGASTRODUODENOSCOPY N/A 9/18/2023    Procedure: EGD (ESOPHAGOGASTRODUODENOSCOPY);  Surgeon: Kg Cleaning MD;  Location: [facility] ENDO (2ND FLR);  Service: Endoscopy;  Laterality: N/A;    ESOPHAGOGASTRODUODENOSCOPY N/A 3/8/2024    Procedure: EGD (ESOPHAGOGASTRODUODENOSCOPY);  Surgeon: Greg Love MD;  Location: [facility] ENDO (2ND FLR);  Service: Endoscopy;  Laterality: N/A;    ESOPHAGOGASTRODUODENOSCOPY N/A 7/24/2024    Procedure: EGD (ESOPHAGOGASTRODUODENOSCOPY);  Surgeon: Faustino Trujillo MD;  Location: [facility] (2ND FLR);  Service: Endoscopy;  Laterality: N/A;     Family History   Problem Relation Name Age of Onset    Pancreatitis Mother      Cancer Mother      Ovarian cancer Mother      No Known Problems Father      Cancer Brother      Breast cancer Maternal Grandmother       Social History[1]  Review of Systems    Physical Exam     Initial Vitals [06/15/25 2111]   BP Pulse Resp Temp SpO2   129/83 96 18 98.2 °F (36.8 °C) 97 %      MAP       --         Physical Exam    Nursing note and vitals reviewed.  Constitutional: He appears well-developed and well-nourished. No distress.   HENT:   Head: Normocephalic and atraumatic.   Nose: Nose normal.   Eyes: Conjunctivae and EOM are normal. Pupils are equal, round, and reactive to light.   Neck:   Normal range of motion.  Cardiovascular:  Normal rate, regular rhythm and normal heart sounds.     Exam reveals no gallop and no friction rub.       No murmur heard.  Pulmonary/Chest: Breath sounds normal. No respiratory distress. He has no wheezes. He has no rhonchi. He has no rales.   Abdominal: Abdomen is soft. He exhibits no distension.   Epigastric  tenderness There is no rebound and no guarding.   Musculoskeletal:         General: No edema. Normal range of motion.      Cervical back: Normal range of motion.     Neurological: He is alert and oriented to person, place, and time. He has normal strength.   Skin: Skin is warm and dry. Capillary refill takes less than 2 seconds. No pallor.   Psychiatric: He has a normal mood and affect.         ED Course   Procedures  Labs Reviewed   COMPREHENSIVE METABOLIC PANEL - Abnormal       Result Value    Sodium 137      Potassium 3.8      Chloride 105      CO2 17 (*)     Glucose 80      BUN 8      Creatinine 0.6      Calcium 9.3      Protein Total 8.1      Albumin 4.7      Bilirubin Total 0.7            AST 50 (*)     ALT 53 (*)     Anion Gap 15      eGFR >60     CBC WITH DIFFERENTIAL - Abnormal    WBC 6.26      RBC 5.24      HGB 13.9 (*)     HCT 44.9      MCV 86      MCH 26.5 (*)     MCHC 31.0 (*)     RDW 20.8 (*)     Platelet Count 224      MPV 9.6      Nucleated RBC 0      Neut % 39.8      Lymph % 42.2      Mono % 12.1      Eos % 4.5      Basophil % 1.1      Imm Grans % 0.3      Neut # 2.49      Lymph # 2.64      Mono # 0.76      Eos # 0.28      Baso # 0.07      Imm Grans # 0.02     ISTAT PROCEDURE - Abnormal    POC Glucose 84      POC BUN 7      POC Creatinine 0.8      POC Sodium 137      POC Potassium 3.7      POC Chloride 103      POC TCO2 (MEASURED) 19 (*)     POC Ionized Calcium 1.11      POC Hematocrit 47      Sample ELPIDIO     LIPASE - Normal    Lipase Level 11     CBC W/ AUTO DIFFERENTIAL    Narrative:     The following orders were created for panel order CBC W/ AUTO DIFFERENTIAL.  Procedure                               Abnormality         Status                     ---------                               -----------         ------                     CBC with Differential[[id number]]       Abnormal            Final result                 Please view results for these tests on the individual orders.    URINALYSIS, REFLEX TO URINE CULTURE   ISTAT CHEM8          Imaging Results    None          Medications   HYDROmorphone injection 1 mg (has no administration in time range)   diphenhydrAMINE injection 12.5 mg (has no administration in time range)   sodium chloride 0.9% bolus 1,000 mL 1,000 mL (1,000 mLs Intravenous New Bag 6/15/25 2317)   ondansetron injection 4 mg (4 mg Intravenous Given 6/15/25 2313)   HYDROmorphone injection 1 mg (1 mg Intravenous Given 6/15/25 2313)   diphenhydrAMINE injection 25 mg (25 mg Intravenous Given 6/15/25 2327)     Medical Decision Making  24yo M who presents with epigastric pain. Hx of pancreatitis but also gastroenteritis and gastritis. No etoh. Reports sx feel similar to prior pancreatitis flares. Nontoxic appearing. Does report some streaks of blood in emesis but c/w MW tear,  not concerning for more acute life threatening source like varices or ulcer.  Abdomen soft, focally tender. Low suspicion for ocpmlication such as perf, abscess or something like obstruction. Had had many CT scans. Do not feel he needs one today. Will get labs, give fluids and pain meds and reassess      Workup benign. Patient feeling better. Stable for dc    Risk  Prescription drug management.                                      Clinical Impression:  Final diagnoses:  [K29.70] Gastritis, presence of bleeding unspecified, unspecified chronicity, unspecified gastritis type (Primary)  [R10.13] Epigastric pain          ED Disposition Condition    Discharge Stable          ED Prescriptions    None       Follow-up Information    None                [1]   Social History  Tobacco Use    Smoking status: Former     Current packs/day: 0.10     Average packs/day: 0.1 packs/day for 4.5 years (0.4 ttl pk-yrs)     Types: Cigarettes     Start date: 2021    Smokeless tobacco: Never   Substance Use Topics    Alcohol use: Yes     Alcohol/week: 4.0 standard drinks of alcohol     Types: 4 Shots of liquor per week    Drug use:  Yes     Types: Marijuana        Wanda Velasquez MD  06/16/25 0031

## 2025-06-16 NOTE — ED TRIAGE NOTES
Tasia Cardona, a 25 y.o. male presents to the ED w/ complaint of LUQ 8/10 pain worsened by vomiting x1 day. Pt states has hx of pancreatitis, feels similar to previous episodes. Took promethazine at home without relief. AAOX4, ambulatory.    Triage note:  Chief Complaint   Patient presents with    Abdominal Pain    Vomiting    Weakness     Vomiting and pain. Had pancreatitis flare this past week.      Review of patient's allergies indicates:   Allergen Reactions    [place] Swelling    Droperidol Hives    Morphine     Pecan nut Swelling    Penicillins Hives     Tolerates cephalosporins    Tolerated piperacillin/tazobactam 11/2023    Sulfa (sulfonamide antibiotics) Hives    Tolonium chloride(toluidine blue-o) Hives, Itching and Rash    Toradol [ketorolac] Rash     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

## 2025-06-18 ENCOUNTER — HOSPITAL ENCOUNTER (EMERGENCY)
Facility: HOSPITAL | Age: 25
Discharge: HOME | End: 2025-06-19
Attending: STUDENT IN AN ORGANIZED HEALTH CARE EDUCATION/TRAINING PROGRAM
Payer: MEDICAID

## 2025-06-18 DIAGNOSIS — R10.13 EPIGASTRIC PAIN: Primary | ICD-10-CM

## 2025-06-18 DIAGNOSIS — R00.0 TACHYCARDIA: ICD-10-CM

## 2025-06-18 LAB
ABSOLUTE EOSINOPHIL (OHS): 0.19 K/UL
ABSOLUTE MONOCYTE (OHS): 0.57 K/UL (ref 0.3–1)
ABSOLUTE NEUTROPHIL COUNT (OHS): 2.47 K/UL (ref 1.8–7.7)
BASOPHILS # BLD AUTO: 0.05 K/UL
BASOPHILS NFR BLD AUTO: 0.8 %
ERYTHROCYTE [DISTWIDTH] IN BLOOD BY AUTOMATED COUNT: 20.7 % (ref 11.5–14.5)
HCT VFR BLD AUTO: 43.8 % (ref 40–54)
HGB BLD-MCNC: 13.7 GM/DL (ref 14–18)
IMM GRANULOCYTES # BLD AUTO: 0.01 K/UL (ref 0–0.04)
IMM GRANULOCYTES NFR BLD AUTO: 0.2 % (ref 0–0.5)
LYMPHOCYTES # BLD AUTO: 2.65 K/UL (ref 1–4.8)
MCH RBC QN AUTO: 26.4 PG (ref 27–31)
MCHC RBC AUTO-ENTMCNC: 31.3 G/DL (ref 32–36)
MCV RBC AUTO: 84 FL (ref 82–98)
NUCLEATED RBC (/100WBC) (OHS): 0 /100 WBC
PLATELET # BLD AUTO: 259 K/UL (ref 150–450)
PMV BLD AUTO: 9.8 FL (ref 9.2–12.9)
RBC # BLD AUTO: 5.19 M/UL (ref 4.6–6.2)
RELATIVE EOSINOPHIL (OHS): 3.2 %
RELATIVE LYMPHOCYTE (OHS): 44.6 % (ref 18–48)
RELATIVE MONOCYTE (OHS): 9.6 % (ref 4–15)
RELATIVE NEUTROPHIL (OHS): 41.6 % (ref 38–73)
WBC # BLD AUTO: 5.94 K/UL (ref 3.9–12.7)

## 2025-06-18 PROCEDURE — 96375 TX/PRO/DX INJ NEW DRUG ADDON: CPT

## 2025-06-18 PROCEDURE — 96374 THER/PROPH/DIAG INJ IV PUSH: CPT

## 2025-06-18 PROCEDURE — 25000003 PHARM REV CODE 250

## 2025-06-18 PROCEDURE — 83735 ASSAY OF MAGNESIUM: CPT

## 2025-06-18 PROCEDURE — 93010 ELECTROCARDIOGRAM REPORT: CPT | Mod: ,,, | Performed by: INTERNAL MEDICINE

## 2025-06-18 PROCEDURE — 85025 COMPLETE CBC W/AUTO DIFF WBC: CPT

## 2025-06-18 PROCEDURE — 80053 COMPREHEN METABOLIC PANEL: CPT

## 2025-06-18 PROCEDURE — 99284 EMERGENCY DEPT VISIT MOD MDM: CPT | Mod: 25

## 2025-06-18 PROCEDURE — 93005 ELECTROCARDIOGRAM TRACING: CPT

## 2025-06-18 PROCEDURE — 83690 ASSAY OF LIPASE: CPT

## 2025-06-18 PROCEDURE — 63600175 PHARM REV CODE 636 W HCPCS

## 2025-06-18 RX ORDER — HYDROMORPHONE HYDROCHLORIDE 1 MG/ML
1 INJECTION, SOLUTION INTRAMUSCULAR; INTRAVENOUS; SUBCUTANEOUS
Refills: 0 | Status: COMPLETED | OUTPATIENT
Start: 2025-06-18 | End: 2025-06-18

## 2025-06-18 RX ORDER — METOCLOPRAMIDE HYDROCHLORIDE 5 MG/ML
10 INJECTION INTRAMUSCULAR; INTRAVENOUS
Status: COMPLETED | OUTPATIENT
Start: 2025-06-18 | End: 2025-06-18

## 2025-06-18 RX ADMIN — METOCLOPRAMIDE 10 MG: 5 INJECTION, SOLUTION INTRAMUSCULAR; INTRAVENOUS at 10:06

## 2025-06-18 RX ADMIN — SODIUM CHLORIDE 1000 ML: 9 INJECTION, SOLUTION INTRAVENOUS at 10:06

## 2025-06-18 RX ADMIN — HYDROMORPHONE HYDROCHLORIDE 1 MG: 1 INJECTION, SOLUTION INTRAMUSCULAR; INTRAVENOUS; SUBCUTANEOUS at 10:06

## 2025-06-19 ENCOUNTER — PATIENT OUTREACH (OUTPATIENT)
Facility: OTHER | Age: 25
End: 2025-06-19
Payer: MEDICAID

## 2025-06-19 VITALS
OXYGEN SATURATION: 96 % | SYSTOLIC BLOOD PRESSURE: 149 MMHG | HEART RATE: 92 BPM | WEIGHT: 170 LBS | DIASTOLIC BLOOD PRESSURE: 77 MMHG | TEMPERATURE: 98 F | RESPIRATION RATE: 16 BRPM | HEIGHT: 70 IN | BODY MASS INDEX: 24.34 KG/M2

## 2025-06-19 LAB
ALBUMIN SERPL BCP-MCNC: 4.7 G/DL (ref 3.5–5.2)
ALP SERPL-CCNC: 104 UNIT/L (ref 40–150)
ALT SERPL W/O P-5'-P-CCNC: 52 UNIT/L (ref 10–44)
ANION GAP (OHS): 15 MMOL/L (ref 8–16)
AST SERPL-CCNC: 48 UNIT/L (ref 11–45)
BILIRUB SERPL-MCNC: 0.3 MG/DL (ref 0.1–1)
BUN SERPL-MCNC: 6 MG/DL (ref 6–20)
CALCIUM SERPL-MCNC: 9 MG/DL (ref 8.7–10.5)
CHLORIDE SERPL-SCNC: 102 MMOL/L (ref 95–110)
CO2 SERPL-SCNC: 21 MMOL/L (ref 23–29)
CREAT SERPL-MCNC: 0.8 MG/DL (ref 0.5–1.4)
GFR SERPLBLD CREATININE-BSD FMLA CKD-EPI: >60 ML/MIN/1.73/M2
GLUCOSE SERPL-MCNC: 105 MG/DL (ref 70–110)
LIPASE SERPL-CCNC: 9 U/L (ref 4–60)
MAGNESIUM SERPL-MCNC: 2.1 MG/DL (ref 1.6–2.6)
OHS QRS DURATION: 86 MS
OHS QTC CALCULATION: 438 MS
POTASSIUM SERPL-SCNC: 3.7 MMOL/L (ref 3.5–5.1)
PROT SERPL-MCNC: 8.1 GM/DL (ref 6–8.4)
SODIUM SERPL-SCNC: 138 MMOL/L (ref 136–145)

## 2025-06-19 PROCEDURE — 96376 TX/PRO/DX INJ SAME DRUG ADON: CPT

## 2025-06-19 PROCEDURE — 63600175 PHARM REV CODE 636 W HCPCS

## 2025-06-19 RX ORDER — HYDROMORPHONE HYDROCHLORIDE 1 MG/ML
1 INJECTION, SOLUTION INTRAMUSCULAR; INTRAVENOUS; SUBCUTANEOUS
Refills: 0 | Status: COMPLETED | OUTPATIENT
Start: 2025-06-19 | End: 2025-06-19

## 2025-06-19 RX ADMIN — HYDROMORPHONE HYDROCHLORIDE 1 MG: 1 INJECTION, SOLUTION INTRAMUSCULAR; INTRAVENOUS; SUBCUTANEOUS at 12:06

## 2025-06-19 NOTE — DISCHARGE INSTRUCTIONS
It was a pleasure taking care of you today!     Home Care:   - Eat bland foods such as banana, rice, toast, and applesauce. Please avoid greasy or fatty foods.    Follow-Up Plan:  - Follow-up with primary care doctor within 3-5 days to discuss your recent ER visit and any additional concerns that you may have.  - Additional testing and/or evaluation as directed by your primary doctor    Return to the Emergency Department for symptoms including but not limited to: persistence or worsening of symptoms, shortness of breath or chest pain, inability to drink without vomiting, passing out/fainting/ loss of consciousness, or if you have other concerns.

## 2025-06-19 NOTE — PROGRESS NOTES
ED Navigator made follow up outreach to patient following recent ED visit. Patient denies needing assistance with appointment scheduling at this time. Patient denies needing resource information at this time.

## 2025-06-19 NOTE — ED TRIAGE NOTES
Patient identifiers verified and correct for Tasia Cardona  C/C: abdominal pain/vomiting  APPEARANCE: awake and alert in NAD.  SKIN: warm, dry and intact. No breakdown or bruising.  MUSCULOSKELETAL: Patient moving all extremities spontaneously, no obvious swelling or deformities noted. Ambulates independently.  RESPIRATORY: Denies shortness of breath.Respirations unlabored.   CARDIAC: Denies CP, 2+ distal pulses; no peripheral edema  ABDOMEN: reports nausea and vomiting. Pain. Pancreatitis flare up.  : voids spontaneously, denies difficulty  Neurologic: AAO x 4; follows commands equal strength in all extremities; denies numbness/tingling. Denies dizziness       Pancreatitis flare up since 0200. Pain has continued to increase and multiple vomiting episodes.

## 2025-06-19 NOTE — ED PROVIDER NOTES
"Encounter Date: 6/18/2025       History     Chief Complaint   Patient presents with    Abdominal Pain     Sharp left side since last night; +N/V; "I think I'm having a pancreatitis flare up "; recently diag 3 days ago from Hosp     25 year old male with a pMHx of chronic pancreatitis, alcohol use disorder, HCV, HIV, brandy-chauhan tear, polycythemia vera, and splenic vein thrombosis who presents with abdominal pain. His pain started a couple of hours after eating crab rangoon's. He endorses 10/10 left upper quadrant and epigastric abdominal pain with multiple episodes of vomiting as well as diarrhea. He notes occassional blood streaked emesis, which he states is common for him when he has episodes like this. Patient is not able to tolerate PO intake. He tried taking promethazine without relief. Patient has had multiple visits to the ED with the same presentation over the last few weeks. He was most recently admitted for this issue on 05/06/2025. He tells me today that he would really like to avoid admission today. Denies fever, chills, headache, chest pain, shortness of breath, bloody stools, or dysuria.        Review of patient's allergies indicates:   Allergen Reactions    [place] Swelling    Droperidol Hives    Morphine     Pecan nut Swelling    Penicillins Hives     Tolerates cephalosporins    Tolerated piperacillin/tazobactam 11/2023    Sulfa (sulfonamide antibiotics) Hives    Tolonium chloride(toluidine blue-o) Hives, Itching and Rash    Toradol [ketorolac] Rash     Past Medical History:   Diagnosis Date    Acute necrotizing pancreatitis 09/20/2023    Alcohol use disorder 10/05/2023    Asthma     Chronic pancreatitis 09/20/2023    Hepatitis C antibody positive in blood 09/18/2023 9/2023 PCR negative    HIV infection 10/2021    Brandy-Chauhan tear 09/18/2023    Normocytic anemia 09/18/2023    Pancreatic pseudocyst/cyst 10/24/2023    Polycythemia vera 11/28/2021    Receives phlebotomy if Hct>45    Positive CMV " IgG serology 09/21/2023    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     Past Surgical History:   Procedure Laterality Date    ENDOSCOPIC ULTRASOUND OF UPPER GASTROINTESTINAL TRACT N/A 10/23/2023    Procedure: ULTRASOUND, UPPER GI TRACT, ENDOSCOPIC;  Surgeon: Emil Villatoro MD;  Location: [facility] (2ND FLR);  Service: Endoscopy;  Laterality: N/A;    ENDOSCOPIC ULTRASOUND OF UPPER GASTROINTESTINAL TRACT N/A 7/24/2024    Procedure: ULTRASOUND, UPPER GI TRACT, ENDOSCOPIC;  Surgeon: Faustino Trujillo MD;  Location: [facility] (2ND FLR);  Service: Endoscopy;  Laterality: N/A;    ERCP N/A 10/23/2023    Procedure: ERCP (ENDOSCOPIC RETROGRADE CHOLANGIOPANCREATOGRAPHY);  Surgeon: Emil Villatoro MD;  Location: [facility] (2ND FLR);  Service: Endoscopy;  Laterality: N/A;    ESOPHAGOGASTRODUODENOSCOPY N/A 9/18/2023    Procedure: EGD (ESOPHAGOGASTRODUODENOSCOPY);  Surgeon: Kg Cleaning MD;  [facility] ([facility]  Service: Endoscopy;  Laterality: N/A;    ESOPHAGOGASTRODUODENOSCOPY N/A 3/8/2024    Procedure: EGD (ESOPHAGOGASTRODUODENOSCOPY);  Surgeon: Greg Love MD;  [facility]  Service: Endoscopy;  Laterality: N/A;    ESOPHAGOGASTRODUODENOSCOPY N/A 7/24/2024    Procedure: EGD (ESOPHAGOGASTRODUODENOSCOPY);  Surgeon: Faustino Trujillo MD;  [facility] ([facility]  Service: Endoscopy;  Laterality: N/A;     Family History   Problem Relation Name Age of Onset    Pancreatitis Mother      Cancer Mother      Ovarian cancer Mother      No Known Problems Father      Cancer Brother      Breast cancer Maternal Grandmother       Social History[1]  Review of Systems    Physical Exam     Initial Vitals [06/18/25 2132]   BP Pulse Resp Temp SpO2   126/74 (!) 122 20 98.3 °F (36.8 °C) 98 %      MAP       --         Physical Exam    Nursing note and vitals reviewed.  Constitutional: He appears well-nourished. He is not diaphoretic. No distress.   Very well appearing male sitting upright in bed.   HENT:    Head: Normocephalic and atraumatic.   Eyes: Conjunctivae are normal.   Neck:   Normal range of motion.  Cardiovascular:  Normal rate and regular rhythm.           No murmur heard.  Pulmonary/Chest: Breath sounds normal. No respiratory distress. He has no wheezes.   Abdominal: Abdomen is soft. He exhibits no distension.   Epigastric and left upper quadrant tenderness. There is no rebound and no guarding.   Musculoskeletal:      Cervical back: Normal range of motion.     Neurological: He is alert and oriented to person, place, and time. GCS score is 15. GCS eye subscore is 4. GCS verbal subscore is 5. GCS motor subscore is 6.   Skin: Skin is warm and dry.   Psychiatric: He has a normal mood and affect.         ED Course   Procedures  Labs Reviewed   COMPREHENSIVE METABOLIC PANEL - Abnormal       Result Value    Sodium 138      Potassium 3.7      Chloride 102      CO2 21 (*)     Glucose 105      BUN 6      Creatinine 0.8      Calcium 9.0      Protein Total 8.1      Albumin 4.7      Bilirubin Total 0.3            AST 48 (*)     ALT 52 (*)     Anion Gap 15      eGFR >60     CBC WITH DIFFERENTIAL - Abnormal    WBC 5.94      RBC 5.19      HGB 13.7 (*)     HCT 43.8      MCV 84      MCH 26.4 (*)     MCHC 31.3 (*)     RDW 20.7 (*)     Platelet Count 259      MPV 9.8      Nucleated RBC 0      Neut % 41.6      Lymph % 44.6      Mono % 9.6      Eos % 3.2      Basophil % 0.8      Imm Grans % 0.2      Neut # 2.47      Lymph # 2.65      Mono # 0.57      Eos # 0.19      Baso # 0.05      Imm Grans # 0.01     LIPASE - Normal    Lipase Level 9     MAGNESIUM - Normal    Magnesium  2.1     CBC W/ AUTO DIFFERENTIAL    Narrative:     The following orders were created for panel order CBC auto differential.  Procedure                               Abnormality         Status                     ---------                               -----------         ------                     CBC with Differential[[id number]]       Abnormal             Final result                 Please view results for these tests on the individual orders.     EKG Readings: (Independently Interpreted)   Initial Reading: No STEMI. Rhythm: Sinus Tachycardia. Heart Rate: 114.       Imaging Results    None          Medications   metoclopramide injection 10 mg (10 mg Intravenous Given 6/18/25 2254)   HYDROmorphone injection 1 mg (1 mg Intravenous Given 6/18/25 2255)   sodium chloride 0.9% bolus 1,000 mL 1,000 mL (0 mLs Intravenous Stopped 6/18/25 2300)   HYDROmorphone injection 1 mg (1 mg Intravenous Given 6/19/25 0014)     Medical Decision Making  Patient is a 25 year-old M who presents to the emergency department with complaints of epigastric and LUQ pain. Patient is non-toxic appearing, afebrile, and hemodynamically stable.     Differential diagnosis includes but is not limited to chronic pancreatitis, gastritis, gastric ulcer, esophagitis, brandy-hitchcock tear, chronic opioid dependence, substance misuse, opioid withdrawal    Labs and imaging reviewed, overall unremarkable, see ED course for details. Patient was given metoclopramide and dilaudid x2. Upon reassessment, he is tolerating PO intake and would like to be discharged.     Disposition   Patient was discharged home with instruction to follow up with PCP to discuss today's visit and any other concerns. Patient requested prescription for opioid pain medications. He was denied this request and was encouraged to follow-up with his gastroenterology referral for further management including EGD as this is a chronic ongoing issue. Given strict ED return precautions. Patient reassessed, discussed dispo, given opportunity to ask additional questions prior to disposition.    Amount and/or Complexity of Data Reviewed  Labs: ordered. Decision-making details documented in ED Course.    Risk  Prescription drug management.               ED Course as of 06/19/25 0107 Wed Jun 18, 2025   2354 WBC: 5.94 [NK]   2354 Hemoglobin(!):  13.7  At baseline [NK]   Thu Jun 19, 2025   0107 Comprehensive metabolic panel(!)  No electrolyte derangement. LFTs at baseline [NK]   0107 Lipase: 9 [NK]   0107 Pulse: 92 [NK]      ED Course User Index  [NK] Gagan Mcdaniel PA-C                           Clinical Impression:  Final diagnoses:  [R10.13] Epigastric pain (Primary)          ED Disposition Condition    Discharge           ED Prescriptions    None       Follow-up Information       Follow up With Specialties Details Why Contact Info    Pina Jones NP Family Medicine Schedule an appointment as soon as possible for a visit  If symptoms worsen [address] [facility] [address]  [phone]                     [1]   Social History  Tobacco Use    Smoking status: Former     Current packs/day: 0.10     Average packs/day: 0.1 packs/day for 4.5 years (0.4 ttl pk-yrs)     Types: Cigarettes     Start date: 2021    Smokeless tobacco: Never   Substance Use Topics    Alcohol use: Yes     Alcohol/week: 4.0 standard drinks of alcohol     Types: 4 Shots of liquor per week    Drug use: Yes     Types: Marijuana        Gagan Mcdaniel PA-C  06/19/25 0107

## 2025-06-20 ENCOUNTER — HOSPITAL ENCOUNTER (EMERGENCY)
Facility: HOSPITAL | Age: 25
Discharge: HOME | End: 2025-06-21
Attending: EMERGENCY MEDICINE
Payer: MEDICAID

## 2025-06-20 ENCOUNTER — HOSPITAL ENCOUNTER (EMERGENCY)
Facility: HOSPITAL | Age: 25
Discharge: HOME | End: 2025-06-20
Attending: EMERGENCY MEDICINE
Payer: MEDICAID

## 2025-06-20 VITALS
DIASTOLIC BLOOD PRESSURE: 85 MMHG | WEIGHT: 160 LBS | SYSTOLIC BLOOD PRESSURE: 119 MMHG | BODY MASS INDEX: 22.9 KG/M2 | HEIGHT: 70 IN | OXYGEN SATURATION: 97 % | TEMPERATURE: 98 F | RESPIRATION RATE: 14 BRPM | HEART RATE: 92 BPM

## 2025-06-20 DIAGNOSIS — G89.29 CHRONIC ABDOMINAL PAIN: Primary | ICD-10-CM

## 2025-06-20 DIAGNOSIS — R10.9 CHRONIC ABDOMINAL PAIN: Primary | ICD-10-CM

## 2025-06-20 DIAGNOSIS — K86.1 CHRONIC PANCREATITIS, UNSPECIFIED PANCREATITIS TYPE: Primary | ICD-10-CM

## 2025-06-20 LAB
ABSOLUTE EOSINOPHIL (OHS): 0.27 K/UL
ABSOLUTE MONOCYTE (OHS): 0.68 K/UL (ref 0.3–1)
ABSOLUTE NEUTROPHIL COUNT (OHS): 2.42 K/UL (ref 1.8–7.7)
ALBUMIN SERPL BCP-MCNC: 4.6 G/DL (ref 3.5–5.2)
ALBUMIN SERPL BCP-MCNC: 4.9 G/DL (ref 3.5–5.2)
ALP SERPL-CCNC: 100 UNIT/L (ref 40–150)
ALP SERPL-CCNC: 98 UNIT/L (ref 40–150)
ALT SERPL W/O P-5'-P-CCNC: 48 UNIT/L (ref 10–44)
ALT SERPL W/O P-5'-P-CCNC: 49 UNIT/L (ref 10–44)
ANION GAP (OHS): 11 MMOL/L (ref 8–16)
ANION GAP (OHS): 13 MMOL/L (ref 8–16)
AST SERPL-CCNC: 46 UNIT/L (ref 11–45)
AST SERPL-CCNC: 47 UNIT/L (ref 11–45)
BASOPHILS # BLD AUTO: 0.07 K/UL
BASOPHILS NFR BLD AUTO: 1 %
BILIRUB SERPL-MCNC: 0.3 MG/DL (ref 0.1–1)
BILIRUB SERPL-MCNC: 0.4 MG/DL (ref 0.1–1)
BUN SERPL-MCNC: 4 MG/DL (ref 6–30)
BUN SERPL-MCNC: 5 MG/DL (ref 6–20)
BUN SERPL-MCNC: 6 MG/DL (ref 6–20)
CALCIUM SERPL-MCNC: 9.2 MG/DL (ref 8.7–10.5)
CALCIUM SERPL-MCNC: 9.5 MG/DL (ref 8.7–10.5)
CHLORIDE SERPL-SCNC: 100 MMOL/L (ref 95–110)
CHLORIDE SERPL-SCNC: 105 MMOL/L (ref 95–110)
CHLORIDE SERPL-SCNC: 106 MMOL/L (ref 95–110)
CO2 SERPL-SCNC: 20 MMOL/L (ref 23–29)
CO2 SERPL-SCNC: 24 MMOL/L (ref 23–29)
CREAT SERPL-MCNC: 0.7 MG/DL (ref 0.5–1.4)
CREAT SERPL-MCNC: 0.8 MG/DL (ref 0.5–1.4)
CREAT SERPL-MCNC: 1 MG/DL (ref 0.5–1.4)
ERYTHROCYTE [DISTWIDTH] IN BLOOD BY AUTOMATED COUNT: 20.2 % (ref 11.5–14.5)
GFR SERPLBLD CREATININE-BSD FMLA CKD-EPI: >60 ML/MIN/1.73/M2
GFR SERPLBLD CREATININE-BSD FMLA CKD-EPI: >60 ML/MIN/1.73/M2
GLUCOSE SERPL-MCNC: 104 MG/DL (ref 70–110)
GLUCOSE SERPL-MCNC: 111 MG/DL (ref 70–110)
GLUCOSE SERPL-MCNC: 92 MG/DL (ref 70–110)
HCT VFR BLD AUTO: 43.1 % (ref 40–54)
HCT VFR BLD CALC: 45 %PCV (ref 36–54)
HGB BLD-MCNC: 13.3 GM/DL (ref 14–18)
IMM GRANULOCYTES # BLD AUTO: 0.01 K/UL (ref 0–0.04)
IMM GRANULOCYTES NFR BLD AUTO: 0.1 % (ref 0–0.5)
LIPASE SERPL-CCNC: 11 U/L (ref 4–60)
LIPASE SERPL-CCNC: 13 U/L (ref 4–60)
LYMPHOCYTES # BLD AUTO: 3.29 K/UL (ref 1–4.8)
MCH RBC QN AUTO: 26.1 PG (ref 27–31)
MCHC RBC AUTO-ENTMCNC: 30.9 G/DL (ref 32–36)
MCV RBC AUTO: 85 FL (ref 82–98)
NUCLEATED RBC (/100WBC) (OHS): 0 /100 WBC
PLATELET # BLD AUTO: 261 K/UL (ref 150–450)
PMV BLD AUTO: 9.8 FL (ref 9.2–12.9)
POC IONIZED CALCIUM: 1.18 MMOL/L (ref 1.06–1.42)
POC TCO2 (MEASURED): 20 MMOL/L (ref 23–29)
POTASSIUM BLD-SCNC: 4.3 MMOL/L (ref 3.5–5.1)
POTASSIUM SERPL-SCNC: 4.1 MMOL/L (ref 3.5–5.1)
POTASSIUM SERPL-SCNC: 4.4 MMOL/L (ref 3.5–5.1)
PROT SERPL-MCNC: 7.9 GM/DL (ref 6–8.4)
PROT SERPL-MCNC: 8.1 GM/DL (ref 6–8.4)
RBC # BLD AUTO: 5.09 M/UL (ref 4.6–6.2)
RELATIVE EOSINOPHIL (OHS): 4 %
RELATIVE LYMPHOCYTE (OHS): 48.8 % (ref 18–48)
RELATIVE MONOCYTE (OHS): 10.1 % (ref 4–15)
RELATIVE NEUTROPHIL (OHS): 36 % (ref 38–73)
SAMPLE: ABNORMAL
SODIUM BLD-SCNC: 139 MMOL/L (ref 136–145)
SODIUM SERPL-SCNC: 135 MMOL/L (ref 136–145)
SODIUM SERPL-SCNC: 139 MMOL/L (ref 136–145)
WBC # BLD AUTO: 6.74 K/UL (ref 3.9–12.7)

## 2025-06-20 PROCEDURE — 82040 ASSAY OF SERUM ALBUMIN: CPT | Performed by: NURSE PRACTITIONER

## 2025-06-20 PROCEDURE — 96361 HYDRATE IV INFUSION ADD-ON: CPT

## 2025-06-20 PROCEDURE — 63600175 PHARM REV CODE 636 W HCPCS: Performed by: EMERGENCY MEDICINE

## 2025-06-20 PROCEDURE — 25000003 PHARM REV CODE 250: Performed by: EMERGENCY MEDICINE

## 2025-06-20 PROCEDURE — 96375 TX/PRO/DX INJ NEW DRUG ADDON: CPT

## 2025-06-20 PROCEDURE — 85025 COMPLETE CBC W/AUTO DIFF WBC: CPT | Performed by: EMERGENCY MEDICINE

## 2025-06-20 PROCEDURE — 80047 BASIC METABLC PNL IONIZED CA: CPT

## 2025-06-20 PROCEDURE — 96376 TX/PRO/DX INJ SAME DRUG ADON: CPT

## 2025-06-20 PROCEDURE — 99284 EMERGENCY DEPT VISIT MOD MDM: CPT | Mod: 25

## 2025-06-20 PROCEDURE — 96374 THER/PROPH/DIAG INJ IV PUSH: CPT

## 2025-06-20 PROCEDURE — 25000003 PHARM REV CODE 250: Performed by: NURSE PRACTITIONER

## 2025-06-20 PROCEDURE — 80053 COMPREHEN METABOLIC PANEL: CPT | Performed by: EMERGENCY MEDICINE

## 2025-06-20 PROCEDURE — 83690 ASSAY OF LIPASE: CPT | Performed by: EMERGENCY MEDICINE

## 2025-06-20 PROCEDURE — 83690 ASSAY OF LIPASE: CPT | Performed by: NURSE PRACTITIONER

## 2025-06-20 PROCEDURE — 99284 EMERGENCY DEPT VISIT MOD MDM: CPT | Mod: 25,27

## 2025-06-20 RX ORDER — HYDROMORPHONE HYDROCHLORIDE 1 MG/ML
1 INJECTION, SOLUTION INTRAMUSCULAR; INTRAVENOUS; SUBCUTANEOUS
Refills: 0 | Status: DISCONTINUED | OUTPATIENT
Start: 2025-06-20 | End: 2025-06-20

## 2025-06-20 RX ORDER — HYDROMORPHONE HYDROCHLORIDE 1 MG/ML
1 INJECTION, SOLUTION INTRAMUSCULAR; INTRAVENOUS; SUBCUTANEOUS
Refills: 0 | Status: COMPLETED | OUTPATIENT
Start: 2025-06-20 | End: 2025-06-20

## 2025-06-20 RX ORDER — METOCLOPRAMIDE HYDROCHLORIDE 5 MG/ML
10 INJECTION INTRAMUSCULAR; INTRAVENOUS
Status: COMPLETED | OUTPATIENT
Start: 2025-06-20 | End: 2025-06-20

## 2025-06-20 RX ORDER — HYDROMORPHONE HYDROCHLORIDE 1 MG/ML
2 INJECTION, SOLUTION INTRAMUSCULAR; INTRAVENOUS; SUBCUTANEOUS
Status: COMPLETED | OUTPATIENT
Start: 2025-06-20 | End: 2025-06-20

## 2025-06-20 RX ORDER — ONDANSETRON HYDROCHLORIDE 2 MG/ML
4 INJECTION, SOLUTION INTRAVENOUS
Status: DISCONTINUED | OUTPATIENT
Start: 2025-06-20 | End: 2025-06-21 | Stop reason: HOSPADM

## 2025-06-20 RX ADMIN — SODIUM CHLORIDE 1000 ML: 9 INJECTION, SOLUTION INTRAVENOUS at 12:06

## 2025-06-20 RX ADMIN — METOCLOPRAMIDE 10 MG: 5 INJECTION, SOLUTION INTRAMUSCULAR; INTRAVENOUS at 12:06

## 2025-06-20 RX ADMIN — HYDROMORPHONE HYDROCHLORIDE 2 MG: 1 INJECTION, SOLUTION INTRAMUSCULAR; INTRAVENOUS; SUBCUTANEOUS at 12:06

## 2025-06-20 RX ADMIN — SODIUM CHLORIDE 1000 ML: 9 INJECTION, SOLUTION INTRAVENOUS at 10:06

## 2025-06-20 RX ADMIN — HYDROMORPHONE HYDROCHLORIDE 1 MG: 1 INJECTION, SOLUTION INTRAMUSCULAR; INTRAVENOUS; SUBCUTANEOUS at 01:06

## 2025-06-20 NOTE — ED TRIAGE NOTES
Tasia Cardona, a 25 y.o. male presents to the ED w/ complaint of     Triage note:  Chief Complaint   Patient presents with    Emesis     Seen yesterday for same complaint. Abdominal pain/vomiting. Diagnosed with pancreatitis      Review of patient's allergies indicates:   Allergen Reactions    [place] Swelling    Droperidol Hives    Morphine     Pecan nut Swelling    Penicillins Hives     Tolerates cephalosporins    Tolerated piperacillin/tazobactam 11/2023    Sulfa (sulfonamide antibiotics) Hives    Tolonium chloride(toluidine blue-o) Hives, Itching and Rash    Toradol [ketorolac] Rash     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

## 2025-06-20 NOTE — ED PROVIDER NOTES
"Chief Complaint   Emesis (Seen yesterday for same complaint. Abdominal pain/vomiting. Diagnosed with pancreatitis )      History Of Present Illness   Tasia Cardona is a 25 y.o. male presenting with recurrent upper abdominal pain consistent with previous episodes of pancreatitis.  The patient states he went home after yesterday's ED visit for the same issues and felt well.  However, when he woke up from sleep, the pain returned.  No fever or chills.  He would like 2 mg of hydromorphone today.      Review of patient's allergies indicates:   Allergen Reactions    [place] Swelling    Droperidol Hives    Morphine     Pecan nut Swelling    Penicillins Hives     Tolerates cephalosporins    Tolerated piperacillin/tazobactam 11/2023    Sulfa (sulfonamide antibiotics) Hives    Tolonium chloride(toluidine blue-o) Hives, Itching and Rash    Toradol [ketorolac] Rash       Medications Ordered Prior to Encounter[1]    Past History   As per HPI and below:  Past Medical History[2]  Past Surgical History[3]    Social History[4]    Family History   Problem Relation Name Age of Onset    Pancreatitis Mother      Cancer Mother      Ovarian cancer Mother      No Known Problems Father      Cancer Brother      Breast cancer Maternal Grandmother         Physical Exam     Vitals:    06/20/25 0000 06/20/25 0041 06/20/25 0114 06/20/25 0142   BP: 138/82  119/85    Pulse: 98  92    Resp: 18 14 16 14   Temp: 98.4 °F (36.9 °C)  98 °F (36.7 °C)    TempSrc: Oral  Oral    SpO2: 97%  97%    Weight: 72.6 kg (160 lb)      Height: 5' 10" (1.778 m)        Appearance: No acute distress.  Skin: No rashes seen.  Good turgor.  No abrasions.  No ecchymoses.  Eyes: No conjunctival injection.  ENT: Oropharynx clear.    Chest: Clear to auscultation bilaterally.  Good air movement.  No wheezes.  No rhonchi.  Cardiovascular: Regular rate and rhythm.  No murmurs. No gallops. No rubs.  Abdomen: Soft.  Not distended.  Epigastric tenderness.  No guarding.  No " rebound.  Musculoskeletal: Good range of motion all joints.  No deformities.  Neck supple.  No meningismus.  Neurologic: Motor intact.  Sensation intact.  Cerebellar intact.  Cranial nerves intact.  Mental Status:  Alert and oriented x 3.  Appropriate, conversant.      Initial MDM   Chronic abdominal pain, recurrent, consistent with the patient's previous episodes of pancreatitis.  He was seen yesterday for same, lipase was normal at that time.  Will give pain medicine, antiemetics, fluids and check labs.  I do not think imaging is indicated given his nonsurgical abdomen and recurrent presentation.      Medications Given     Medications   sodium chloride 0.9% bolus 1,000 mL 1,000 mL (0 mLs Intravenous Stopped 6/20/25 0214)   metoclopramide injection 10 mg (10 mg Intravenous Given 6/20/25 0040)   HYDROmorphone injection 2 mg (2 mg Intravenous Given 6/20/25 0041)   HYDROmorphone injection 1 mg (1 mg Intravenous Given 6/20/25 0142)       Results and Course     Abnormal Labs Reviewed   COMPREHENSIVE METABOLIC PANEL - Abnormal; Notable for the following components:       Result Value    CO2 20 (*)     AST 47 (*)     ALT 49 (*)     All other components within normal limits   CBC WITH DIFFERENTIAL - Abnormal; Notable for the following components:    HGB 13.3 (*)     MCH 26.1 (*)     MCHC 30.9 (*)     RDW 20.2 (*)     Neut % 36.0 (*)     Lymph % 48.8 (*)     All other components within normal limits   ISTAT PROCEDURE - Abnormal; Notable for the following components:    POC Glucose 111 (*)     POC BUN 4 (*)     POC TCO2 (MEASURED) 20 (*)     All other components within normal limits       Imaging Results    None         ED Course as of 06/20/25 1711 Fri Jun 20, 2025   0036 POC Creatinine: 1.0 [DC]   0052 WBC: 6.74 [DC]   0052 Hemoglobin(!): 13.3 [DC]   0052 Platelet Count: 261 [DC]      ED Course User Index  [DC] Srini Rick MD               MDM, Impression and Plan   25 y.o. male with recurrent abdominal pain that  he attributes to pancreatitis.  Turned over to Dr. Velasquez pending lab results.           Final diagnoses:  [K86.1] Chronic pancreatitis, unspecified pancreatitis type (Primary)        ED Disposition Condition    Discharge Stable          ED Prescriptions    None       Follow-up Information       Follow up With Specialties Details Why Contact Pina Morse NP Family Medicine Call   [address]  [place] [address]  [phone]                   [1]   No current facility-administered medications on file prior to encounter.     Current Outpatient Medications on File Prior to Encounter   Medication Sig Dispense Refill    dpjvtutxs-wixjwssr-yyhusoz ala (BIKTARVY) -25 mg (25 kg or greater) Take 1 tablet by mouth once daily. 30 tablet 5    metoclopramide HCl (REGLAN) 10 MG tablet Take 1 tablet (10 mg total) by mouth every 6 (six) hours. 30 tablet 0    oxyCODONE-acetaminophen (PERCOCET) 5-325 mg per tablet Take 1 tablet by mouth every 6 (six) hours as needed for Pain. 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   [3]   Past Surgical History:  Procedure Laterality Date    ENDOSCOPIC ULTRASOUND OF UPPER GASTROINTESTINAL TRACT N/A 10/23/2023    Procedure: ULTRASOUND, UPPER GI TRACT, ENDOSCOPIC;  Surgeon: Emil Villatoro MD;  Location: [address]);  Service: Endoscopy;  Laterality: N/A;    ENDOSCOPIC ULTRASOUND OF UPPER GASTROINTESTINAL TRACT N/A 7/24/2024    Procedure: ULTRASOUND, UPPER GI TRACT,  ENDOSCOPIC;  Surgeon: Faustino Trujillo MD;  Location: [facility] ENDO (2ND FLR);  Service: Endoscopy;  Laterality: N/A;    ERCP N/A 10/23/2023    Procedure: ERCP (ENDOSCOPIC RETROGRADE CHOLANGIOPANCREATOGRAPHY);  Surgeon: Emil Villatoro MD;  Location: [facility] ENDO (2ND FLR);  Service: Endoscopy;  Laterality: N/A;    ESOPHAGOGASTRODUODENOSCOPY N/A 9/18/2023    Procedure: EGD (ESOPHAGOGASTRODUODENOSCOPY);  Surgeon: Kg Cleaning MD;  Location: [facility] ENDO (2ND FLR);  Service: Endoscopy;  Laterality: N/A;    ESOPHAGOGASTRODUODENOSCOPY N/A 3/8/2024    Procedure: EGD (ESOPHAGOGASTRODUODENOSCOPY);  Surgeon: Greg Love MD;  Location: [facility] ENDO (2ND FLR);  Service: Endoscopy;  Laterality: N/A;    ESOPHAGOGASTRODUODENOSCOPY N/A 7/24/2024    Procedure: EGD (ESOPHAGOGASTRODUODENOSCOPY);  Surgeon: Faustino Trujillo MD;  Location: [facility] (2ND FLR);  Service: Endoscopy;  Laterality: N/A;   [4]   Social History  Tobacco Use    Smoking status: Former     Current packs/day: 0.10     Average packs/day: 0.1 packs/day for 4.5 years (0.4 ttl pk-yrs)     Types: Cigarettes     Start date: 2021    Smokeless tobacco: Never   Substance Use Topics    Alcohol use: Yes     Alcohol/week: 4.0 standard drinks of alcohol     Types: 4 Shots of liquor per week    Drug use: Yes     Types: Marijuana        Srini Rick MD  06/20/25 0214

## 2025-06-20 NOTE — PROVIDER PROGRESS NOTES - EMERGENCY DEPT.
Encounter Date: 6/19/2025    ED Physician Progress Notes        I assumed care of patient at sign out pending: here reporting pancreatitis pain. VSS. Frequent visits for similar with normal Cts and lipase.    Summary of Results:    Labs Reviewed   COMPREHENSIVE METABOLIC PANEL - Abnormal       Result Value    Sodium 139      Potassium 4.4      Chloride 106      CO2 20 (*)     Glucose 104      BUN 6      Creatinine 0.7      Calcium 9.5      Protein Total 8.1      Albumin 4.9      Bilirubin Total 0.4            AST 47 (*)     ALT 49 (*)     Anion Gap 13      eGFR >60     CBC WITH DIFFERENTIAL - Abnormal    WBC 6.74      RBC 5.09      HGB 13.3 (*)     HCT 43.1      MCV 85      MCH 26.1 (*)     MCHC 30.9 (*)     RDW 20.2 (*)     Platelet Count 261      MPV 9.8      Nucleated RBC 0      Neut % 36.0 (*)     Lymph % 48.8 (*)     Mono % 10.1      Eos % 4.0      Basophil % 1.0      Imm Grans % 0.1      Neut # 2.42      Lymph # 3.29      Mono # 0.68      Eos # 0.27      Baso # 0.07      Imm Grans # 0.01     ISTAT PROCEDURE - Abnormal    POC Glucose 111 (*)     POC BUN 4 (*)     POC Creatinine 1.0      POC Sodium 139      POC Potassium 4.3      POC Chloride 105      POC TCO2 (MEASURED) 20 (*)     POC Ionized Calcium 1.18      POC Hematocrit 45      Sample ELPIDIO     LIPASE - Normal    Lipase Level 11     CBC W/ AUTO DIFFERENTIAL    Narrative:     The following orders were created for panel order CBC W/ AUTO DIFFERENTIAL.  Procedure                               Abnormality         Status                     ---------                               -----------         ------                     CBC with Differential[[id number]]       Abnormal            Final result                 Please view results for these tests on the individual orders.   URINALYSIS, REFLEX TO URINE CULTURE   ISTAT CHEM8     .    Disposition:  discharge.

## 2025-06-20 NOTE — ED NOTES
I-STAT Chem-8+ Results:   Value Reference Range   Sodium 139 136-145 mmol/L   Potassium  4.3 3.5-5.1 mmol/L   Chloride 105  mmol/L   Ionized Calcium 1.18 1.06-1.42 mmol/L   CO2 (measured) 20 23-29 mmol/L   Glucose 111  mg/dL   BUN 4 6-30 mg/dL   Creatinine 1.0 0.5-1.4 mg/dL   Hematocrit 45 36-54%

## 2025-06-21 VITALS
RESPIRATION RATE: 16 BRPM | OXYGEN SATURATION: 100 % | HEIGHT: 70 IN | TEMPERATURE: 99 F | WEIGHT: 160 LBS | BODY MASS INDEX: 22.9 KG/M2 | DIASTOLIC BLOOD PRESSURE: 82 MMHG | HEART RATE: 85 BPM | SYSTOLIC BLOOD PRESSURE: 125 MMHG

## 2025-06-21 LAB
ABSOLUTE EOSINOPHIL (OHS): 0.18 K/UL
ABSOLUTE MONOCYTE (OHS): 0.46 K/UL (ref 0.3–1)
ABSOLUTE NEUTROPHIL COUNT (OHS): 1.51 K/UL (ref 1.8–7.7)
BASOPHILS # BLD AUTO: 0.04 K/UL
BASOPHILS NFR BLD AUTO: 0.9 %
ERYTHROCYTE [DISTWIDTH] IN BLOOD BY AUTOMATED COUNT: 20.6 % (ref 11.5–14.5)
ETHANOL SERPL-MCNC: 11 MG/DL
HCT VFR BLD AUTO: 43 % (ref 40–54)
HGB BLD-MCNC: 13 GM/DL (ref 14–18)
IMM GRANULOCYTES # BLD AUTO: 0.01 K/UL (ref 0–0.04)
IMM GRANULOCYTES NFR BLD AUTO: 0.2 % (ref 0–0.5)
LYMPHOCYTES # BLD AUTO: 2.32 K/UL (ref 1–4.8)
MCH RBC QN AUTO: 26.2 PG (ref 27–31)
MCHC RBC AUTO-ENTMCNC: 30.2 G/DL (ref 32–36)
MCV RBC AUTO: 87 FL (ref 82–98)
NUCLEATED RBC (/100WBC) (OHS): 0 /100 WBC
PLATELET # BLD AUTO: 205 K/UL (ref 150–450)
PMV BLD AUTO: 10 FL (ref 9.2–12.9)
RBC # BLD AUTO: 4.96 M/UL (ref 4.6–6.2)
RELATIVE EOSINOPHIL (OHS): 4 %
RELATIVE LYMPHOCYTE (OHS): 51.3 % (ref 18–48)
RELATIVE MONOCYTE (OHS): 10.2 % (ref 4–15)
RELATIVE NEUTROPHIL (OHS): 33.4 % (ref 38–73)
WBC # BLD AUTO: 4.52 K/UL (ref 3.9–12.7)

## 2025-06-21 PROCEDURE — 63600175 PHARM REV CODE 636 W HCPCS: Performed by: EMERGENCY MEDICINE

## 2025-06-21 PROCEDURE — 85025 COMPLETE CBC W/AUTO DIFF WBC: CPT | Performed by: EMERGENCY MEDICINE

## 2025-06-21 PROCEDURE — 96374 THER/PROPH/DIAG INJ IV PUSH: CPT

## 2025-06-21 PROCEDURE — 82077 ASSAY SPEC XCP UR&BREATH IA: CPT | Performed by: EMERGENCY MEDICINE

## 2025-06-21 RX ORDER — HYDROMORPHONE HYDROCHLORIDE 2 MG/ML
2 INJECTION, SOLUTION INTRAMUSCULAR; INTRAVENOUS; SUBCUTANEOUS
Status: COMPLETED | OUTPATIENT
Start: 2025-06-21 | End: 2025-06-21

## 2025-06-21 RX ADMIN — HYDROMORPHONE HYDROCHLORIDE 2 MG: 2 INJECTION INTRAMUSCULAR; INTRAVENOUS; SUBCUTANEOUS at 01:06

## 2025-06-21 NOTE — FIRST PROVIDER EVALUATION
Emergency Department TeleTriage Encounter Note      CHIEF COMPLAINT    Chief Complaint   Patient presents with    Abdominal Pain       VITAL SIGNS   Initial Vitals [06/20/25 2139]   BP Pulse Resp Temp SpO2   126/79 110 16 98.6 °F (37 °C) 98 %      MAP       --            ALLERGIES    Review of patient's allergies indicates:   Allergen Reactions    [place] Swelling    Droperidol Hives    Morphine     Pecan nut Swelling    Penicillins Hives     Tolerates cephalosporins    Tolerated piperacillin/tazobactam 11/2023    Sulfa (sulfonamide antibiotics) Hives    Tolonium chloride(toluidine blue-o) Hives, Itching and Rash    Toradol [ketorolac] Rash       PROVIDER TRIAGE NOTE  This is a teletriage evaluation of a 25 y.o. male presenting to the ED complaining of recurrent vomiting. Recently seen and DC'd for same but states he is now unable to tolerate PO.     Alert, no distress.     Initial orders will be placed and care will be transferred to an alternate provider when patient is roomed for a full evaluation. Any additional orders and the final disposition will be determined by that provider.         ORDERS  Labs Reviewed   CBC W/ AUTO DIFFERENTIAL    Narrative:     The following orders were created for panel order CBC W/ AUTO DIFFERENTIAL.  Procedure                               Abnormality         Status                     ---------                               -----------         ------                     CBC with Differential[[id number]]                                                        Please view results for these tests on the individual orders.   COMPREHENSIVE METABOLIC PANEL   LIPASE   URINALYSIS, REFLEX TO URINE CULTURE   CBC WITH DIFFERENTIAL       ED Orders (720h ago, onward)      Start Ordered     Status Ordering Provider    06/20/25 2230 06/20/25 2229  Vital signs  Every 2 hours         Ordered PABLO RICK    06/20/25 2230 06/20/25 2229  sodium chloride 0.9% bolus 1,000 mL 1,000 mL  Once          Ordered PABLO RICK N.    06/20/25 2230 06/20/25 2229  ondansetron injection 4 mg  ED 1 Time         Ordered PABLO RICK N.    06/20/25 2229 06/20/25 2229  Diet NPO  Diet effective now         Ordered PABLO RICK N.    06/20/25 2229 06/20/25 2229  Insert peripheral IV  Once         Ordered PABLO RICK N.    06/20/25 2229 06/20/25 2229  CBC W/ AUTO DIFFERENTIAL  STAT         Ordered PBALO RICK N.    06/20/25 2229 06/20/25 2229  Comp. Metabolic Panel  STAT         Ordered PABLO RICK N.    06/20/25 2229 06/20/25 2229  Lipase  STAT         Ordered PABLO RICK N.    06/20/25 2229 06/20/25 2229  Urinalysis, Reflex to Urine Culture Urine, Clean Catch  STAT         Ordered PABLO RICK N.    06/20/25 2229 06/20/25 2229  CBC with Differential  PROCEDURE ONCE         Ordered PABLO RICK N.              Virtual Visit Note: The provider triage portion of this emergency department evaluation and documentation was performed via [employer], a HIPAA-compliant telemedicine application, in concert with a tele-presenter in the room. A face to face patient evaluation with one of my colleagues will occur once the patient is placed in an emergency department room.      DISCLAIMER: This note was prepared with [employer] voice recognition transcription software. Garbled syntax, mangled pronouns, and other bizarre constructions may be attributed to that software system.

## 2025-06-21 NOTE — PROVIDER PROGRESS NOTES - EMERGENCY DEPT.
Encounter Date: 6/20/2025    ED Physician Progress Notes          Patient is a 25-year-old male past medical history chronic pancreatitis, alcohol use disorder that is presenting for typical epigastric abdominal pain.  Patient has been seen multiple times in the ED.  The patient was handed off to me by previous physician pending completion of workup and medication.  Patient denies any changes to his typical presentation of left upper quadrant/epigastric abdominal pain.  Patient admits that he drank alcohol several days ago.  Patient denies any recent alcohol use.    Physical exam: Very well-appearing 25-year-old male, no distress, appropriately conversational.  Benign cardiac, respiratory, abdominal exam.  Specifically no tenderness to palpation epigastric or over abdomen.  Benign abdominal exam as a whole.    Plan:  Workup reassuring.  Alcohol level 11, consider possible recent alcohol use.  However patient denies any recent alcohol use.  Patient has been given Dilaudid here.  Patient otherwise appears well, does not appear to be clinically intoxicated or lethargic and Dilaudid given several hours previously.  Patient is ambulatory without any difficulty.  Patient states that he has a ride home.  At this time we will discharge.  Consider concern for drug-seeking behavior.  I have advised patient to stop drinking any alcohol at all, can trigger chronic pancreatitis.  Also discussed with the patient alcohol rehab, follow up with gastroenterology.  Patient understands if he has any new or worsening symptoms to return to ED

## 2025-06-21 NOTE — ED PROVIDER NOTES
"Chief Complaint   Abdominal Pain      History Of Present Illness   Tasia Cardona is a 25 y.o. male presenting with upper abdominal pain, nausea and vomiting for the 3rd straight day.  He gets treated with Dilaudid, antiemetics and fluids; he subsequently feels better, then goes home.  When he wakes up the next day, the vomiting returns.  He has had multiple admissions this year for acute on chronic pancreatitis, confirmed on imaging, though his lipase is rarely elevated.  Chart review indicates that has generally felt to be due to ongoing alcohol use.  Today, he denies any recent alcohol use, stating his last drink was a couple of weeks ago.      Review of patient's allergies indicates:   Allergen Reactions    [place] Swelling    Droperidol Hives    Morphine     Pecan nut Swelling    Penicillins Hives     Tolerates cephalosporins    Tolerated piperacillin/tazobactam 11/2023    Sulfa (sulfonamide antibiotics) Hives    Tolonium chloride(toluidine blue-o) Hives, Itching and Rash    Toradol [ketorolac] Rash       Medications Ordered Prior to Encounter[1]    Past History   As per HPI and below:  Past Medical History[2]  Past Surgical History[3]    Social History[4]    Family History   Problem Relation Name Age of Onset    Pancreatitis Mother      Cancer Mother      Ovarian cancer Mother      No Known Problems Father      Cancer Brother      Breast cancer Maternal Grandmother         Physical Exam     Vitals:    06/20/25 2139 06/21/25 0115 06/21/25 0315   BP: 126/79  125/82   BP Location: Right arm  Right arm   Pulse: 110  85   Resp: 16 20 16   Temp: 98.6 °F (37 °C)  98.6 °F (37 °C)   TempSrc: Oral  Oral   SpO2: 98%  100%   Weight: 72.6 kg (160 lb)     Height: 5' 10" (1.778 m)       Appearance: Uncomfortable.  Skin: No rashes seen.  Good turgor.  No abrasions.  No ecchymoses.  Eyes: No conjunctival injection.  ENT: Oropharynx clear.    Chest: Clear to auscultation bilaterally.  Good air movement.  No wheezes.  No " rhonchi.  Cardiovascular: Regular rate and rhythm.  No murmurs. No gallops. No rubs.  Abdomen: Soft.  Not distended.  Minimal epigastric tenderness.  No guarding.  No rebound.  Musculoskeletal: Good range of motion all joints.  No deformities.  Neck supple.  No meningismus.  Neurologic: Motor intact.  Sensation intact.  Cerebellar intact.  Cranial nerves intact.  Mental Status:  Alert and oriented x 3.  Appropriate, conversant.      Initial MDM   Recurrent abdominal pain, nausea and vomiting.  Chart review shows that concerned for drug-seeking behavior is present on previous hospitalizations, and I get that since today, given his reappearance asking for 2 mg of Dilaudid.  His abdominal exam is not particularly impressive.  However, he has been positive on imaging this year as well.  His exam does not make me think he needs imaging today.  I will give him pain medicine, nausea medicine, fluids and reassess.    Medications Given     Medications   sodium chloride 0.9% bolus 1,000 mL 1,000 mL (0 mLs Intravenous Stopped 6/21/25 0001)   HYDROmorphone (PF) injection 2 mg (2 mg Intravenous Given 6/21/25 0115)       Results and Course     Abnormal Labs Reviewed   COMPREHENSIVE METABOLIC PANEL - Abnormal; Notable for the following components:       Result Value    Sodium 135 (*)     BUN 5 (*)     AST 46 (*)     ALT 48 (*)     All other components within normal limits   ALCOHOL,MEDICAL (ETHANOL) - Abnormal; Notable for the following components:    Alcohol, Serum 11 (*)     All other components within normal limits   CBC WITH DIFFERENTIAL - Abnormal; Notable for the following components:    HGB 13.0 (*)     MCH 26.2 (*)     MCHC 30.2 (*)     RDW 20.6 (*)     Neut % 33.4 (*)     Lymph % 51.3 (*)     Neut # 1.51 (*)     All other components within normal limits       Imaging Results    None         ED Course as of 06/21/25 1521   Fri Jun 20, 2025 2348 Lipase: 13 [DC]   2348 Creatinine: 0.8 [DC]      ED Course User Index  [DC]  Srini Rick MD               MDM, Impression and Plan   25 y.o. male with chronic abdominal pain, ongoing alcohol use, nausea and vomiting.  No pancreatic enzyme elevation.  Turned over to Dr. Ann pending labs.         Final diagnoses:  [R10.9, G89.29] Chronic abdominal pain (Primary)        ED Disposition Condition    Discharge Stable          ED Prescriptions    None       Follow-up Information       Follow up With Specialties Details Why Contact Pina Morse NP Family Medicine In 2 days For re-evaluation [address] [facility] [address]  [phone]      Gastroenterology                     [1]   No current facility-administered medications on file prior to encounter.     Current Outpatient Medications on File Prior to Encounter   Medication Sig Dispense Refill    qohcfoeon-vgqxofjx-nirwlcg ala (BIKTARVY) -25 mg (25 kg or greater) Take 1 tablet by mouth once daily. 30 tablet 5    metoclopramide HCl (REGLAN) 10 MG tablet Take 1 tablet (10 mg total) by mouth every 6 (six) hours. 30 tablet 0    oxyCODONE-acetaminophen (PERCOCET) 5-325 mg per tablet Take 1 tablet by mouth every 6 (six) hours as needed for Pain. 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   [3]   Past Surgical History:  Procedure Laterality Date    ENDOSCOPIC ULTRASOUND OF UPPER GASTROINTESTINAL TRACT N/A 10/23/2023    Procedure: ULTRASOUND, UPPER GI TRACT, ENDOSCOPIC;  Surgeon: Emil Villatoro MD;  Location: [facility] ([address] [place] [address]);   Service: Endoscopy;  Laterality: N/A;    ENDOSCOPIC ULTRASOUND OF UPPER GASTROINTESTINAL TRACT N/A 7/24/2024    Procedure: ULTRASOUND, UPPER GI TRACT, ENDOSCOPIC;  Surgeon: Faustino Trujillo MD;  Location: [facility] ENDO (2ND FLR);  Service: Endoscopy;  Laterality: N/A;    ERCP N/A 10/23/2023    Procedure: ERCP (ENDOSCOPIC RETROGRADE CHOLANGIOPANCREATOGRAPHY);  Surgeon: Emil Villatoro MD;  Location: [facility] ENDO (2ND FLR);  Service: Endoscopy;  Laterality: N/A;    ESOPHAGOGASTRODUODENOSCOPY N/A 9/18/2023    Procedure: EGD (ESOPHAGOGASTRODUODENOSCOPY);  Surgeon: Kg Cleaning MD;  Location: [facility] ENDO (2ND FLR);  Service: Endoscopy;  Laterality: N/A;    ESOPHAGOGASTRODUODENOSCOPY N/A 3/8/2024    Procedure: EGD (ESOPHAGOGASTRODUODENOSCOPY);  Surgeon: Greg Love MD;  Location: [facility] ENDO (2ND FLR);  Service: Endoscopy;  Laterality: N/A;    ESOPHAGOGASTRODUODENOSCOPY N/A 7/24/2024    Procedure: EGD (ESOPHAGOGASTRODUODENOSCOPY);  Surgeon: Faustino Trujillo MD;  Location: [facility] ENDO (2ND FLR);  Service: Endoscopy;  Laterality: N/A;   [4]   Social History  Tobacco Use    Smoking status: Former     Current packs/day: 0.10     Average packs/day: 0.1 packs/day for 4.5 years (0.4 ttl pk-yrs)     Types: Cigarettes     Start date: 2021    Smokeless tobacco: Never   Substance Use Topics    Alcohol use: Yes     Alcohol/week: 4.0 standard drinks of alcohol     Types: 4 Shots of liquor per week    Drug use: Yes     Types: Marijuana        Srini Rick MD  06/21/25 1523

## 2025-06-21 NOTE — ED TRIAGE NOTES
Patient endorses abdominal pain, patient stated he was here yesterday but tried to eat a piece of toast and immediately vomited. Patient is denying any C/P,SOB and diarrhea at this time.

## 2025-06-22 ENCOUNTER — HOSPITAL ENCOUNTER (EMERGENCY)
Facility: OTHER | Age: 25
Discharge: HOME | End: 2025-06-22
Attending: EMERGENCY MEDICINE
Payer: MEDICAID

## 2025-06-22 VITALS
TEMPERATURE: 98 F | OXYGEN SATURATION: 97 % | SYSTOLIC BLOOD PRESSURE: 125 MMHG | DIASTOLIC BLOOD PRESSURE: 68 MMHG | RESPIRATION RATE: 16 BRPM | BODY MASS INDEX: 23.62 KG/M2 | WEIGHT: 165 LBS | HEART RATE: 98 BPM | HEIGHT: 70 IN

## 2025-06-22 DIAGNOSIS — F10.90 ALCOHOL USE: ICD-10-CM

## 2025-06-22 DIAGNOSIS — K29.70 GASTRITIS, PRESENCE OF BLEEDING UNSPECIFIED, UNSPECIFIED CHRONICITY, UNSPECIFIED GASTRITIS TYPE: Primary | ICD-10-CM

## 2025-06-22 DIAGNOSIS — R10.13 EPIGASTRIC PAIN: ICD-10-CM

## 2025-06-22 LAB
ABSOLUTE EOSINOPHIL (OHS): 0.28 K/UL
ABSOLUTE MONOCYTE (OHS): 0.67 K/UL (ref 0.3–1)
ABSOLUTE NEUTROPHIL COUNT (OHS): 2.37 K/UL (ref 1.8–7.7)
ALBUMIN SERPL BCP-MCNC: 4.4 G/DL (ref 3.5–5.2)
ALP SERPL-CCNC: 92 UNIT/L (ref 40–150)
ALT SERPL W/O P-5'-P-CCNC: 52 UNIT/L (ref 10–44)
ANION GAP (OHS): 16 MMOL/L (ref 8–16)
AST SERPL-CCNC: 41 UNIT/L (ref 11–45)
BASOPHILS # BLD AUTO: 0.06 K/UL
BASOPHILS NFR BLD AUTO: 0.9 %
BILIRUB SERPL-MCNC: 0.3 MG/DL (ref 0.1–1)
BILIRUB UR QL STRIP.AUTO: NEGATIVE
BUN SERPL-MCNC: 5 MG/DL (ref 6–20)
CALCIUM SERPL-MCNC: 9.3 MG/DL (ref 8.7–10.5)
CHLORIDE SERPL-SCNC: 107 MMOL/L (ref 95–110)
CLARITY UR: CLEAR
CO2 SERPL-SCNC: 18 MMOL/L (ref 23–29)
COLOR UR AUTO: COLORLESS
CREAT SERPL-MCNC: 0.9 MG/DL (ref 0.5–1.4)
ERYTHROCYTE [DISTWIDTH] IN BLOOD BY AUTOMATED COUNT: 20.7 % (ref 11.5–14.5)
ETHANOL SERPL-MCNC: 218 MG/DL
GFR SERPLBLD CREATININE-BSD FMLA CKD-EPI: >60 ML/MIN/1.73/M2
GLUCOSE SERPL-MCNC: 128 MG/DL (ref 70–110)
GLUCOSE UR QL STRIP: NEGATIVE
HCT VFR BLD AUTO: 45.5 % (ref 40–54)
HGB BLD-MCNC: 14.2 GM/DL (ref 14–18)
HGB UR QL STRIP: NEGATIVE
IMM GRANULOCYTES # BLD AUTO: 0.01 K/UL (ref 0–0.04)
IMM GRANULOCYTES NFR BLD AUTO: 0.1 % (ref 0–0.5)
KETONES UR QL STRIP: NEGATIVE
LEUKOCYTE ESTERASE UR QL STRIP: NEGATIVE
LIPASE SERPL-CCNC: 12 U/L (ref 4–60)
LYMPHOCYTES # BLD AUTO: 3.35 K/UL (ref 1–4.8)
MCH RBC QN AUTO: 26.7 PG (ref 27–31)
MCHC RBC AUTO-ENTMCNC: 31.2 G/DL (ref 32–36)
MCV RBC AUTO: 86 FL (ref 82–98)
NITRITE UR QL STRIP: NEGATIVE
NUCLEATED RBC (/100WBC) (OHS): 0 /100 WBC
PH UR STRIP: 5 [PH]
PLATELET # BLD AUTO: 275 K/UL (ref 150–450)
PMV BLD AUTO: 9.5 FL (ref 9.2–12.9)
POTASSIUM SERPL-SCNC: 4 MMOL/L (ref 3.5–5.1)
PROT SERPL-MCNC: 8.7 GM/DL (ref 6–8.4)
PROT UR QL STRIP: NEGATIVE
RBC # BLD AUTO: 5.31 M/UL (ref 4.6–6.2)
RELATIVE EOSINOPHIL (OHS): 4.2 %
RELATIVE LYMPHOCYTE (OHS): 49.7 % (ref 18–48)
RELATIVE MONOCYTE (OHS): 9.9 % (ref 4–15)
RELATIVE NEUTROPHIL (OHS): 35.2 % (ref 38–73)
SODIUM SERPL-SCNC: 141 MMOL/L (ref 136–145)
SP GR UR STRIP: <1.005
UROBILINOGEN UR STRIP-ACNC: NEGATIVE EU/DL
WBC # BLD AUTO: 6.74 K/UL (ref 3.9–12.7)

## 2025-06-22 PROCEDURE — 82077 ASSAY SPEC XCP UR&BREATH IA: CPT | Performed by: EMERGENCY MEDICINE

## 2025-06-22 PROCEDURE — 25000003 PHARM REV CODE 250: Performed by: EMERGENCY MEDICINE

## 2025-06-22 PROCEDURE — 96375 TX/PRO/DX INJ NEW DRUG ADDON: CPT

## 2025-06-22 PROCEDURE — 83690 ASSAY OF LIPASE: CPT | Performed by: EMERGENCY MEDICINE

## 2025-06-22 PROCEDURE — 81003 URINALYSIS AUTO W/O SCOPE: CPT | Performed by: EMERGENCY MEDICINE

## 2025-06-22 PROCEDURE — 99284 EMERGENCY DEPT VISIT MOD MDM: CPT | Mod: 25

## 2025-06-22 PROCEDURE — 85025 COMPLETE CBC W/AUTO DIFF WBC: CPT | Performed by: EMERGENCY MEDICINE

## 2025-06-22 PROCEDURE — 96365 THER/PROPH/DIAG IV INF INIT: CPT

## 2025-06-22 PROCEDURE — 63600175 PHARM REV CODE 636 W HCPCS: Performed by: EMERGENCY MEDICINE

## 2025-06-22 PROCEDURE — 80053 COMPREHEN METABOLIC PANEL: CPT | Performed by: EMERGENCY MEDICINE

## 2025-06-22 RX ORDER — ALUMINUM HYDROXIDE, MAGNESIUM HYDROXIDE, AND SIMETHICONE 1200; 120; 1200 MG/30ML; MG/30ML; MG/30ML
30 SUSPENSION ORAL ONCE
Status: DISCONTINUED | OUTPATIENT
Start: 2025-06-22 | End: 2025-06-22

## 2025-06-22 RX ORDER — LIDOCAINE HYDROCHLORIDE 20 MG/ML
15 SOLUTION OROPHARYNGEAL ONCE
Status: DISCONTINUED | OUTPATIENT
Start: 2025-06-22 | End: 2025-06-23 | Stop reason: HOSPADM

## 2025-06-22 RX ORDER — FAMOTIDINE 20 MG/1
20 TABLET, FILM COATED ORAL 2 TIMES DAILY PRN
Qty: 20 TABLET | Refills: 0 | Status: SHIPPED | OUTPATIENT
Start: 2025-06-22 | End: 2026-06-22

## 2025-06-22 RX ORDER — ALUMINUM HYDROXIDE, MAGNESIUM HYDROXIDE, AND SIMETHICONE 1200; 120; 1200 MG/30ML; MG/30ML; MG/30ML
30 SUSPENSION ORAL ONCE
Status: DISCONTINUED | OUTPATIENT
Start: 2025-06-22 | End: 2025-06-23 | Stop reason: HOSPADM

## 2025-06-22 RX ORDER — FAMOTIDINE 10 MG/ML
20 INJECTION, SOLUTION INTRAVENOUS
Status: COMPLETED | OUTPATIENT
Start: 2025-06-22 | End: 2025-06-22

## 2025-06-22 RX ORDER — HYDROMORPHONE HYDROCHLORIDE 2 MG/1
2 TABLET ORAL ONCE
Refills: 0 | Status: COMPLETED | OUTPATIENT
Start: 2025-06-22 | End: 2025-06-22

## 2025-06-22 RX ORDER — LIDOCAINE HYDROCHLORIDE 20 MG/ML
15 SOLUTION OROPHARYNGEAL ONCE
Status: DISCONTINUED | OUTPATIENT
Start: 2025-06-22 | End: 2025-06-22

## 2025-06-22 RX ORDER — OMEPRAZOLE 20 MG/1
20 CAPSULE, DELAYED RELEASE ORAL DAILY
Qty: 30 CAPSULE | Refills: 11 | Status: SHIPPED | OUTPATIENT
Start: 2025-06-22 | End: 2026-06-22

## 2025-06-22 RX ADMIN — PROMETHAZINE HYDROCHLORIDE 12.5 MG: 25 INJECTION INTRAMUSCULAR; INTRAVENOUS at 09:06

## 2025-06-22 RX ADMIN — HYDROMORPHONE HYDROCHLORIDE 2 MG: 2 TABLET ORAL at 09:06

## 2025-06-22 RX ADMIN — FAMOTIDINE 20 MG: 10 INJECTION, SOLUTION INTRAVENOUS at 09:06

## 2025-06-23 LAB — HOLD SPECIMEN: NORMAL

## 2025-06-23 NOTE — ED PROVIDER NOTES
"Encounter Date: 6/22/2025    SCRIBE #1 NOTE: I, MANJINDER DERAS, am scribing for, and in the presence of,  Marcelino Caceres MD. I have scribed the following portions of the note - Other sections scribed: HPI, ROS, PE.       History     Chief Complaint   Patient presents with    Abdominal Pain     LUQ pain w/ NV over the past 2-3 days   Hx. Chronic Pancreatitis    Not actively vomiting at this time     25-year-old male with history of HIV on HAART, chronic pancreatitis, alcohol abuse, polycythemia vera history of splenic vein thrombosis no longer on anticoagulation presents for evaluation of recurrent left upper abdominal pain and vomiting. He describes the pain as a "crushing" feeling inside of his abdomen. Patient has had multiple recent ED visits for same presentation, most recently 2 days ago.  He states that he was about to be admitted until another physician discharged him.  He admits to drinking alcohol a few days ago. He tried taking a oral Phenergan but vomited and states his vomit had blood streaks in it but not large amount of blood. He notes that he has percocet's at home but refused to take them because he did not want to throw them up like what he did with the Phenergan. He reports that he is not consistent with his GI, and last visit with a GI provider was a few months ago in [place]. He denies any associated fevers, blood in stool, melena, or other new complaints.  No chest pain, SOB or recent illness.  He is compliant with HIV medications.               Review of patient's allergies indicates:   Allergen Reactions    Idalou Swelling    Droperidol Hives    Morphine     Pecan nut Swelling    Penicillins Hives     Tolerates cephalosporins    Tolerated piperacillin/tazobactam 11/2023    Sulfa (sulfonamide antibiotics) Hives    Tolonium chloride(toluidine blue-o) Hives, Itching and Rash    Toradol [ketorolac] Rash     Past Medical History:   Diagnosis Date    Acute necrotizing pancreatitis 09/20/2023    " Alcohol use disorder 10/05/2023    Asthma     Chronic pancreatitis 09/20/2023    Hepatitis C antibody positive in blood 09/18/2023 9/2023 PCR negative    HIV infection 10/2021    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     Past Surgical History:   Procedure Laterality Date    ENDOSCOPIC ULTRASOUND OF UPPER GASTROINTESTINAL TRACT N/A 10/23/2023    Procedure: ULTRASOUND, UPPER GI TRACT, ENDOSCOPIC;  Surgeon: Emil Villatoro MD;  Location: [facility] (2ND FLR);  Service: Endoscopy;  Laterality: N/A;    ENDOSCOPIC ULTRASOUND OF UPPER GASTROINTESTINAL TRACT N/A 7/24/2024    Procedure: ULTRASOUND, UPPER GI TRACT, ENDOSCOPIC;  Surgeon: Faustino Trujillo MD;  Location: [facility] (2ND FLR);  Service: Endoscopy;  Laterality: N/A;    ERCP N/A 10/23/2023    Procedure: ERCP (ENDOSCOPIC RETROGRADE CHOLANGIOPANCREATOGRAPHY);  Surgeon: Emil Villatoro MD;  Location: [facility] (2ND FLR);  Service: Endoscopy;  Laterality: N/A;    ESOPHAGOGASTRODUODENOSCOPY N/A 9/18/2023    Procedure: EGD (ESOPHAGOGASTRODUODENOSCOPY);  Surgeon: Kg Cleaning MD;  Location: [facility] ENDO (2ND FLR);  Service: Endoscopy;  Laterality: N/A;    ESOPHAGOGASTRODUODENOSCOPY N/A 3/8/2024    Procedure: EGD (ESOPHAGOGASTRODUODENOSCOPY);  Surgeon: Greg Love MD;  Location: [facility] ENDO (2ND FLR);  Service: Endoscopy;  Laterality: N/A;    ESOPHAGOGASTRODUODENOSCOPY N/A 7/24/2024    Procedure: EGD (ESOPHAGOGASTRODUODENOSCOPY);  Surgeon: Faustino Trujillo MD;  Location: [facility] ENDO (2ND FLR);  Service: Endoscopy;  Laterality: N/A;     Family History   Problem Relation Name Age of Onset    Pancreatitis Mother      Cancer Mother      Ovarian cancer Mother      No Known Problems Father      Cancer Brother      Breast cancer Maternal Grandmother       Social  History[1]  Review of Systems  Constitutional-no fever  HEENT-no congestion  Eyes-no redness  Respiratory-no shortness of breath  Cardio-no chest pain  GI- (+) LUQ abdominal pain, (+) nausea  Endocrine-no cold intolerance  -no difficulty urinating  MSK-no myalgias  Skin-no rashes  Allergy-no environmental allergy  Neurologic- no headache  Hematology-no swollen nodes  Behavioral-no confusion    Physical Exam     Initial Vitals [06/22/25 2036]   BP Pulse Resp Temp SpO2   120/81 (!) 114 17 98 °F (36.7 °C) 97 %      MAP       --         Physical Exam  Constitutional:  Generally well-appearing 25-year-old man in no obvious distress  Eyes: Conjunctivae normal.  ENT       Head: Normocephalic, atraumatic.       Nose: No congestion.       Mouth/Throat: Mucous membranes are moist.  Hematological/Lymphatic/Immunilogical: No cervical lymphadenopathy.  Cardiovascular: Normal rate, regular rhythm. Normal and symmetric distal pulses.  Respiratory: Normal respiratory effort. Breath sounds are normal.  Gastrointestinal: Soft, tenderness in the left upper quadrant of the abdomen  Musculoskeletal: Normal range of motion in all extremities. No obvious deformities or swelling.  Neurologic: Alert, oriented. Normal speech and language. No gross focal neurologic deficits are appreciated.  Skin: Skin is warm, dry. No rash noted.  Psychiatric: Mood and affect are normal.     ED Course   Procedures  Labs Reviewed   URINALYSIS, REFLEX TO URINE CULTURE - Abnormal       Result Value    Color, UA Colorless (*)     Appearance, UA Clear      pH, UA 5.0      Spec Grav UA <1.005 (*)     Protein, UA Negative      Glucose, UA Negative      Ketones, UA Negative      Bilirubin, UA Negative      Blood, UA Negative      Nitrites, UA Negative      Urobilinogen, UA Negative      Leukocyte Esterase, UA Negative     COMPREHENSIVE METABOLIC PANEL - Abnormal    Sodium 141      Potassium 4.0      Chloride 107      CO2 18 (*)     Glucose 128 (*)     BUN 5 (*)      Creatinine 0.9      Calcium 9.3      Protein Total 8.7 (*)     Albumin 4.4      Bilirubin Total 0.3      ALP 92      AST 41      ALT 52 (*)     Anion Gap 16      eGFR >60     CBC WITH DIFFERENTIAL - Abnormal    WBC 6.74      RBC 5.31      HGB 14.2      HCT 45.5      MCV 86      MCH 26.7 (*)     MCHC 31.2 (*)     RDW 20.7 (*)     Platelet Count 275      MPV 9.5      Nucleated RBC 0      Neut % 35.2 (*)     Lymph % 49.7 (*)     Mono % 9.9      Eos % 4.2      Basophil % 0.9      Imm Grans % 0.1      Neut # 2.37      Lymph # 3.35      Mono # 0.67      Eos # 0.28      Baso # 0.06      Imm Grans # 0.01     ALCOHOL,MEDICAL (ETHANOL) - Abnormal    Alcohol, Serum 218 (*)    LIPASE - Normal    Lipase Level 12     CBC W/ AUTO DIFFERENTIAL    Narrative:     The following orders were created for panel order CBC auto differential.  Procedure                               Abnormality         Status                     ---------                               -----------         ------                     CBC with Differential[[id number]]       Abnormal            Final result                 Please view results for these tests on the individual orders.   GREY TOP URINE HOLD    Extra Tube Hold for add-ons.            Imaging Results    None          Medications   promethazine (PHENERGAN) 12.5 mg in 0.9% NaCl 50 mL IVPB (0 mg Intravenous Stopped 6/22/25 2149)   HYDROmorphone tablet 2 mg (2 mg Oral Given 6/22/25 2145)   famotidine (PF) injection 20 mg (20 mg Intravenous Given 6/22/25 2130)     Medical Decision Making  Abdominal pain represents a profoundly broad differential, this patient's symptoms are nondescript in nature and while unlikely could represent-  Pancreatitis, cholecystitis, appendicitis, gastritis, cystitis, pyleonephritis, PUD, obstructions constipation neoplasm among a myriad of other diagnoses.     A thorough examination and history was undertaken and appropriate diagnostics ordered with a diagnosis identified as  below based on summative findings. It is possible this represents a more sinister underlying process and as such precautions related thereto were specifically discussed with the patient.       Had a prolonged discussion with this patient about my concern about his recurrent opiate abuse in the emergency department and alcohol use.  No clinical signs to suggest pancreatitis at this time.  Ethanol is markedly elevated likely has an element of gastritis.    Problems Addressed:  Alcohol use: chronic illness or injury with exacerbation, progression, or side effects of treatment  Epigastric pain: chronic illness or injury with exacerbation, progression, or side effects of treatment  Gastritis, presence of bleeding unspecified, unspecified chronicity, unspecified gastritis type: acute illness or injury    Amount and/or Complexity of Data Reviewed  External Data Reviewed: labs, radiology, ECG and notes.     Details: Multiple recurrent episodes of presumed pancreatitis and alcohol abuse  Labs: ordered. Decision-making details documented in ED Course.    Risk  OTC drugs.  Prescription drug management.  Diagnosis or treatment significantly limited by social determinants of health.            Scribe Attestation:   Scribe #1: I performed the above scribed service and the documentation accurately describes the services I performed. I attest to the accuracy of the note.        ED Course as of 06/24/25 0451   Sun Jun 22, 2025   2053 Discussed ethanol with patient. Endorses having consumed many martinis last night. >7. Denies consumption today despite level of >200.  [TK]      ED Course User Index  [TK] Marcelino Caceres MD          Physician Attestation for Scribe: I, marcelino caceres, reviewed documentation as scribed in my presence, which is both accurate and complete.                   Clinical Impression:  Final diagnoses:  [K29.70] Gastritis, presence of bleeding unspecified, unspecified chronicity, unspecified gastritis  type (Primary)  [R10.13] Epigastric pain  [F10.90] Alcohol use          ED Disposition Condition    Discharge Stable          ED Prescriptions       Medication Sig Dispense Start Date End Date Auth. Provider    omeprazole (PRILOSEC) 20 MG capsule Take 1 capsule (20 mg total) by mouth once daily. 30 capsule 6/22/2025 6/22/2026 Marcelino Caceres MD    famotidine (PEPCID) 20 MG tablet Take 1 tablet (20 mg total) by mouth 2 (two) times daily as needed for Heartburn. 20 tablet 6/22/2025 6/22/2026 Marcelino Caceres MD          Follow-up Information       Follow up With Specialties Details Why Contact Info    Kg Mcdaniels MD Gastroenterology Call in 1 day If symptoms worsen, For a follow up visit about today [address]  [address]  [facility] GASTROENTEROLOGY  [facility] [address]  [phone]            Launch MDCalc MDM  MDCalc MDM Module  Jun 24 2025 4:50 AM [Marcelino Caceres]  Data:  - Test/documents/historian: 3+ tests ordered  1 test reviewed (previous imaging with pancreatic cyst)  3 external notes reviewed (multiple recurrent episodes of pancreatitis)  Problems: pancreatitis considered (high)  Additional encounter diagnoses: Gastritis, presence of bleeding unspecified, unspecified chronicity, unspecified gastritis type, Alcohol use  Risk: HYDROmorphone tablet + 1 more (Rx drug management)             [1]   Social History  Tobacco Use    Smoking status: Former     Current packs/day: 0.10     Average packs/day: 0.1 packs/day for 4.5 years (0.4 ttl pk-yrs)     Types: Cigarettes     Start date: 2021    Smokeless tobacco: Never   Substance Use Topics    Alcohol use: Yes     Alcohol/week: 4.0 standard drinks of alcohol     Types: 4 Shots of liquor per week    Drug use: Yes     Types: Marijuana        Marcelino Caceres MD  06/24/25 1213

## 2025-06-23 NOTE — ED PROVIDER NOTES
Encounter Date: 6/22/2025    SCRIBE #1 NOTE: I, Yudy Thomas, am scribing for, and in the presence of,  Marcelino Caceres MD. I have scribed the following portions of the note - Other sections scribed: HPI, ROS, PE.       History     Chief Complaint   Patient presents with    Abdominal Pain     LUQ pain w/ NV over the past 2-3 days   Hx. Chronic Pancreatitis    Not actively vomiting at this time     Time seen by provider: 9:49 PM    This is a 25 y.o. male who presents with complaint of           Review of patient's allergies indicates:   Allergen Reactions    Adolphus Swelling    Droperidol Hives    Morphine     Pecan nut Swelling    Penicillins Hives     Tolerates cephalosporins    Tolerated piperacillin/tazobactam 11/2023    Sulfa (sulfonamide antibiotics) Hives    Tolonium chloride(toluidine blue-o) Hives, Itching and Rash    Toradol [ketorolac] Rash     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     Past Surgical History:   Procedure Laterality Date    ENDOSCOPIC ULTRASOUND OF UPPER GASTROINTESTINAL TRACT N/A 10/23/2023    Procedure: ULTRASOUND, UPPER GI TRACT, ENDOSCOPIC;  Surgeon: Emil Villatoro MD;  Location: [address]);  Service: Endoscopy;  Laterality: N/A;    ENDOSCOPIC ULTRASOUND OF UPPER GASTROINTESTINAL TRACT N/A 7/24/2024    Procedure: ULTRASOUND, UPPER GI TRACT, ENDOSCOPIC;  Surgeon: Faustino Trujillo MD;  Location: [facility] ([address] [place] [address]);  Service: Endoscopy;  Laterality: N/A;    ERCP N/A 10/23/2023    Procedure: ERCP  (ENDOSCOPIC RETROGRADE CHOLANGIOPANCREATOGRAPHY);  Surgeon: Emil Villatoro MD;  Location: [facility] ENDO (2ND FLR);  Service: Endoscopy;  Laterality: N/A;    ESOPHAGOGASTRODUODENOSCOPY N/A 9/18/2023    Procedure: EGD (ESOPHAGOGASTRODUODENOSCOPY);  Surgeon: Kg Cleaning MD;  Location: [facility] ENDO (2ND FLR);  Service: Endoscopy;  Laterality: N/A;    ESOPHAGOGASTRODUODENOSCOPY N/A 3/8/2024    Procedure: EGD (ESOPHAGOGASTRODUODENOSCOPY);  Surgeon: Greg Love MD;  Location: [facility] ENDO (2ND FLR);  Service: Endoscopy;  Laterality: N/A;    ESOPHAGOGASTRODUODENOSCOPY N/A 7/24/2024    Procedure: EGD (ESOPHAGOGASTRODUODENOSCOPY);  Surgeon: Faustino Trujillo MD;  Location: [facility] END[facility] (2ND FLR);  Service: Endoscopy;  Laterality: N/A;     Family History   Problem Relation Name Age of Onset    Pancreatitis Mother      Cancer Mother      Ovarian cancer Mother      No Known Problems Father      Cancer Brother      Breast cancer Maternal Grandmother       Social History[1]  Review of Systems  Constitutional-no fever  HEENT-no congestion  Eyes-no redness  Respiratory-no shortness of breath  Cardio-no chest pain  GI-no abdominal pain  Endocrine-no cold intolerance  -no difficulty urinating  MSK-no myalgias  Skin-no rashes  Allergy-no environmental allergy  Neurologic-, no headache  Hematology-no swollen nodes  Behavioral-no confusion    Physical Exam     Initial Vitals [06/22/25 2036]   BP Pulse Resp Temp SpO2   120/81 (!) 114 17 98 °F (36.7 °C) 97 %      MAP       --         Physical Exam  Constitutional: ***Well appearing, no distress.  Eyes: Conjunctivae normal.  ENT       Head: Normocephalic, atraumatic.       Nose: No congestion.       Mouth/Throat: Mucous membranes are moist.  Hematological/Lymphatic/Immunilogical: No cervical lymphadenopathy.  Cardiovascular: Normal rate, regular rhythm. Normal and symmetric distal pulses.  Respiratory: Normal respiratory effort. Breath sounds are normal.  Gastrointestinal: Soft,  nontender.   Musculoskeletal: Normal range of motion in all extremities. No obvious deformities or swelling.  Neurologic: Alert, oriented. Normal speech and language. No gross focal neurologic deficits are appreciated.  Skin: Skin is warm, dry. No rash noted.  Psychiatric: Mood and affect are normal.     ED Course   Procedures  Labs Reviewed   COMPREHENSIVE METABOLIC PANEL - Abnormal       Result Value    Sodium 141      Potassium 4.0      Chloride 107      CO2 18 (*)     Glucose 128 (*)     BUN 5 (*)     Creatinine 0.9      Calcium 9.3      Protein Total 8.7 (*)     Albumin 4.4      Bilirubin Total 0.3      ALP 92      AST 41      ALT 52 (*)     Anion Gap 16      eGFR >60     CBC WITH DIFFERENTIAL - Abnormal    WBC 6.74      RBC 5.31      HGB 14.2      HCT 45.5      MCV 86      MCH 26.7 (*)     MCHC 31.2 (*)     RDW 20.7 (*)     Platelet Count 275      MPV 9.5      Nucleated RBC 0      Neut % 35.2 (*)     Lymph % 49.7 (*)     Mono % 9.9      Eos % 4.2      Basophil % 0.9      Imm Grans % 0.1      Neut # 2.37      Lymph # 3.35      Mono # 0.67      Eos # 0.28      Baso # 0.06      Imm Grans # 0.01     ALCOHOL,MEDICAL (ETHANOL) - Abnormal    Alcohol, Serum 218 (*)    LIPASE - Normal    Lipase Level 12     CBC W/ AUTO DIFFERENTIAL    Narrative:     The following orders were created for panel order CBC auto differential.  Procedure                               Abnormality         Status                     ---------                               -----------         ------                     CBC with Differential[[id number]]       Abnormal            Final result                 Please view results for these tests on the individual orders.   URINALYSIS, REFLEX TO URINE CULTURE          Imaging Results    None          Medications   promethazine (PHENERGAN) 12.5 mg in 0.9% NaCl 50 mL IVPB (12.5 mg Intravenous New Bag 6/22/25 2129)   HYDROmorphone tablet 2 mg (2 mg Oral Given 6/22/25 2145)   famotidine (PF) injection 20  "mg (20 mg Intravenous Given 6/22/25 2130)     Medical Decision Making  Amount and/or Complexity of Data Reviewed  External Data Reviewed: labs and notes.  Labs: ordered. Decision-making details documented in ED Course.    Risk  Prescription drug management.            Scribe Attestation:   Scribe #1: I performed the above scribed service and the documentation accurately describes the services I performed. I attest to the accuracy of the note.              Physician Attestation for Scribe: I, ***, reviewed documentation as scribed in my presence, which is both accurate and complete.                   Clinical Impression:   ***Please document a Clinical Impression and click the "Refresh" button to refresh your note and automatically pull in before signing.***           Launch MDCalc MDM  ***DO NOT DELETE***Use link above to launch MDCalc MDM tool. Save the tool results and then refresh the note (lower left corner or Ctrl + F11). The MDM tool results must be imported into the note to sign it.           [1]   Social History  Tobacco Use    Smoking status: Former     Current packs/day: 0.10     Average packs/day: 0.1 packs/day for 4.5 years (0.4 ttl pk-yrs)     Types: Cigarettes     Start date: 2021    Smokeless tobacco: Never   Substance Use Topics    Alcohol use: Yes     Alcohol/week: 4.0 standard drinks of alcohol     Types: 4 Shots of liquor per week    Drug use: Yes     Types: Marijuana     "

## 2025-06-23 NOTE — ED NOTES
Bed: 10  Expected date:   Expected time:   Means of arrival: Personal Transportation  Comments:   Universal Instructions Sheet      Your procedure/Surgery is scheduled at [facility]:  [address]   on 10/17/2022 at 3:15 PM, please plan to arrive at 1:15 PM.    Please do NOT follow the arrival time on your live well cony, it is incorrect and doing so may cause your procedure to be cancelled (we are working on the issue).    Free  service is available Monday through Friday starting at 8am until 3:30pm     If you have received the COVID vaccine, bring your vaccination card with you the day of your procedure.    Please bring your insurance card, 's license and a list of your medications, vitamins and supplements to the hospital, including the last dosage and time taken.    ON DAY OF PROCEDURE, CHECK IN AT:  Outpatient Care- located on the 1st floor. ([phone])      COVID Testing & EKG    Your covid test is scheduled on: 10/15/2022 at 7:20 AM    At [facility] on:  [address], [place]    [phone]      Important Information:    Hibiclens Highly Recommended    Please bathe or shower the evening before and morning of your procedure/surgery.  Avoid using lotions, creams, powders, make-up and deodorant on your clean skin.    Remove all jewelry, earrings, body piercings and contact lenses before coming to the hospital.    You may wear you glasses, hearing aids and/or dentures to the hospital. Please bring a case as they will need to be removed prior to the procedure/surgery.      If your doctor mentioned that you may might spend the night, please bring a small overnight bag with toiletries and any personal items you may need.  If you use a CPAP machine and will be spending the night, bring your machine, tubing and mask.    Due to anesthesia, you will not be able to operate power tools, drink alcohol, or drive a vehicle for 24 hours after surgery.  You will not be able to walk home, use public transportation or take a cab, Uber or Lyft home.       *If you are not being admitted after your procedure, you must have a responsible adult (18 or older) to drive you home and it is recommended that you have a responsible adult with you overnight following the procedure/surgery.*    Diet:    DO NOT eat any solid food after midnight prior to your procedure/surgery. This includes hard candy, gum and mints. Eating after midnight could cause a delay or cancellation of your procedure/surgery.      You may have clear liquids up until 4 hours before your procedure/surgery. This includes liquids that do not contain pulp, dairy or cloudiness. Clear liquids include beverages like water, apple juice, sprite/7Up, broth, Jello, black coffee, and tea.       Medication Instructions:    If you use a rescue inhaler for asthma, please bring it with you and use it if needed on the day of your procedure/surgery.    Stop ALL vitamins and supplements 7 days prior to procedure, including Fish Oil, Vitamins A,B,C,D,E, Preservision, and PrenatalVitamins.    STOP taking non-steroidal, anti-inflammatory drugs, otherwise known as NSAIDS per your surgeon's instructions.  Examples of NSAIDS are Aleve, ibuprofen, Motrin, Advil and Naproxen.          Take only the following medications before coming to the hospital. If they are pills, you may take them with a small sip of water:   TAKE ALL MORNING PRESCRIPTION MEDICATIONS INCLUDING INHALER, EXCEPT DO NOT TAKE ENALAPRIL.          If there are any changes in your physical condition, such as cold, fever or infection, or you have specific questions regarding your procedure/surgery, please contact your surgeon directly.    For questions regarding these instructions, contact Pre-Admission Testing at [phone], Monday through Friday, 8 am - 4 pm.    On the day of your procedure, please bring in a copy of your complete up-to-date medication list, I.D., insurance card and COVID vaccine card (if you have been vaccinated).          REQUIRED INSTRUCTIONS  PRESURGERY SHOWER/BATH   DO NOT: shave any part of the surgical area for at least 2 days prior to surgery.   Tiny skin cuts and abrasions from shaving in the surgical area can increase risk of infection.   DO NOT use lotions on skin before surgery.   DO use a fresh clean towel after each shower.   DO dress in clean clothes after each of your showers.   DO shower or bathe your whole body, including hair, on the night before surgery.   DO shower again on the morning of surgery if time permits (do not wash hair during this shower)     GENERAL REQUIRED SHOWER/BATH INSTRUCTIONS (SOAP AND WATER)   1. Use warm, but not hot, water for shower/bath       NOTE: Showers are recommended. Showers are more effective than a bath for pre-operative skin cleaning   2. Use regular soap to remove dirt and germs from your skin   3. Do not scrub so vigorously that skin is abraded or scratched   4. Use clean towels to gently dry your skin after each shower/bath          Instructions for Preoperative Skin Cleansing Before Planned Surgical Procedure at [facility]   For your safety and to help prevent infection, please follow this:    HIGHLY RECOMMENDED additional antiseptic shower/bath.   ANTISEPTIC SHOWER/BATH PROCESS     e.g. HIBICLENS* or alternative chlorhexidine (CHG) containing skin antisepsis solutions   Chlorhexidine gluconate (2% - 4%) skin cleansing solutions are wash solutions used to kill germs on the skin. They are available over the counter (no prescription needed) at minimal cost at most pharmacies and drug stores.   *WHEN USING HIBICLENS* MAY CAUSE SHOWER AND BATHTUB SURFACES TO BECOME SLIPPERY*   Instructions for \"whole body\" shower or bath with chlorhexidine gluconate (CHG) antiseptic solution   1. After your required shower or bath, rinse thoroughly.   2. Step away from the stream of water, and thoroughly apply enough solution to cover skin below the neck (not head or face), within skin folds, and in hard to reach  areas (e.g. the back of the knees, inside elbows, etc). Use your hands to apply and rub onto your skin without using a washcloth. Note: Solution will NOT suds up when applying   3. Rinse thoroughly.   4. Dry off with a clean towel.   5. Dress in clean clothes.   • DO NOT use on the head or face DO NOT use in the genital area   • DO NOT use on non-healing wounds DO NOT use in eyes, ears or mouth.   Making sure that your skin is clean and ready for surgery at home is very important.   You can reduce the risk of infection by following the required instructions above.     email

## 2025-06-23 NOTE — DISCHARGE INSTRUCTIONS
Mr. Cardona,    Thank you for letting me care for you today! It was nice meeting you, and I hope you feel better soon.   If you would like access to your chart and what was done today please utilize the [facility].   Please come back to [facility] for all of your future medical needs.    Our goal in the emergency department is to always give you outstanding care and exceptional service. You may receive a survey by mail or e-mail in the next week regarding your experience in our ED. We would greatly appreciate you completing and returning the survey. Your feedback provides us with a way to recognize our staff who give very good care and it helps us learn how to improve when your experience was below our aspiration of excellence.     Sincerely,    Marcelino Caceres MD  Board Certified Emergency Physician

## 2025-06-23 NOTE — ED NOTES
Admits to drinking alcohol recently w/ his siblings in town & visiting  Denies any drug usage    *Last episode of NV occurred 20 minutes pta            (-)otc meds pta; unable to keep anything down  (-)cp, sob, dizziness, weakness or D  (-)difficulty voiding / flank discomfort  (-)fever, chills or body aches

## 2025-06-25 PROCEDURE — 99284 EMERGENCY DEPT VISIT MOD MDM: CPT | Mod: 25

## 2025-06-26 ENCOUNTER — HOSPITAL ENCOUNTER (EMERGENCY)
Facility: OTHER | Age: 25
Discharge: HOME | End: 2025-06-26
Attending: EMERGENCY MEDICINE
Payer: MEDICAID

## 2025-06-26 VITALS
RESPIRATION RATE: 16 BRPM | WEIGHT: 170 LBS | SYSTOLIC BLOOD PRESSURE: 121 MMHG | DIASTOLIC BLOOD PRESSURE: 70 MMHG | TEMPERATURE: 98 F | OXYGEN SATURATION: 97 % | HEIGHT: 70 IN | HEART RATE: 91 BPM | BODY MASS INDEX: 24.34 KG/M2

## 2025-06-26 DIAGNOSIS — K86.0 ALCOHOL-INDUCED CHRONIC PANCREATITIS: Primary | ICD-10-CM

## 2025-06-26 DIAGNOSIS — R10.12 LEFT UPPER QUADRANT ABDOMINAL PAIN: ICD-10-CM

## 2025-06-26 LAB
ABSOLUTE EOSINOPHIL (OHS): 0.28 K/UL
ABSOLUTE MONOCYTE (OHS): 0.72 K/UL (ref 0.3–1)
ABSOLUTE NEUTROPHIL COUNT (OHS): 1.79 K/UL (ref 1.8–7.7)
ALBUMIN SERPL BCP-MCNC: 3.8 G/DL (ref 3.5–5.2)
ALP SERPL-CCNC: 90 UNIT/L (ref 40–150)
ALT SERPL W/O P-5'-P-CCNC: 63 UNIT/L (ref 10–44)
ANION GAP (OHS): 15 MMOL/L (ref 8–16)
AST SERPL-CCNC: 62 UNIT/L (ref 11–45)
BASOPHILS # BLD AUTO: 0.06 K/UL
BASOPHILS NFR BLD AUTO: 1 %
BILIRUB SERPL-MCNC: 0.3 MG/DL (ref 0.1–1)
BUN SERPL-MCNC: 6 MG/DL (ref 6–20)
CALCIUM SERPL-MCNC: 8.4 MG/DL (ref 8.7–10.5)
CHLORIDE SERPL-SCNC: 106 MMOL/L (ref 95–110)
CO2 SERPL-SCNC: 17 MMOL/L (ref 23–29)
CREAT SERPL-MCNC: 0.8 MG/DL (ref 0.5–1.4)
ERYTHROCYTE [DISTWIDTH] IN BLOOD BY AUTOMATED COUNT: 20 % (ref 11.5–14.5)
GFR SERPLBLD CREATININE-BSD FMLA CKD-EPI: >60 ML/MIN/1.73/M2
GLUCOSE SERPL-MCNC: 111 MG/DL (ref 70–110)
HCT VFR BLD AUTO: 44.6 % (ref 40–54)
HGB BLD-MCNC: 14.2 GM/DL (ref 14–18)
IMM GRANULOCYTES # BLD AUTO: 0.01 K/UL (ref 0–0.04)
IMM GRANULOCYTES NFR BLD AUTO: 0.2 % (ref 0–0.5)
LIPASE SERPL-CCNC: 13 U/L (ref 4–60)
LYMPHOCYTES # BLD AUTO: 3.2 K/UL (ref 1–4.8)
MCH RBC QN AUTO: 27.3 PG (ref 27–31)
MCHC RBC AUTO-ENTMCNC: 31.8 G/DL (ref 32–36)
MCV RBC AUTO: 86 FL (ref 82–98)
NUCLEATED RBC (/100WBC) (OHS): 0 /100 WBC
PLATELET # BLD AUTO: 238 K/UL (ref 150–450)
PMV BLD AUTO: 9.8 FL (ref 9.2–12.9)
POTASSIUM SERPL-SCNC: 3.8 MMOL/L (ref 3.5–5.1)
PROT SERPL-MCNC: 8.1 GM/DL (ref 6–8.4)
RBC # BLD AUTO: 5.2 M/UL (ref 4.6–6.2)
RELATIVE EOSINOPHIL (OHS): 4.6 %
RELATIVE LYMPHOCYTE (OHS): 52.8 % (ref 18–48)
RELATIVE MONOCYTE (OHS): 11.9 % (ref 4–15)
RELATIVE NEUTROPHIL (OHS): 29.5 % (ref 38–73)
SODIUM SERPL-SCNC: 138 MMOL/L (ref 136–145)
WBC # BLD AUTO: 6.06 K/UL (ref 3.9–12.7)

## 2025-06-26 PROCEDURE — 96361 HYDRATE IV INFUSION ADD-ON: CPT

## 2025-06-26 PROCEDURE — 25000003 PHARM REV CODE 250: Performed by: EMERGENCY MEDICINE

## 2025-06-26 PROCEDURE — 96375 TX/PRO/DX INJ NEW DRUG ADDON: CPT

## 2025-06-26 PROCEDURE — 96365 THER/PROPH/DIAG IV INF INIT: CPT

## 2025-06-26 PROCEDURE — 85025 COMPLETE CBC W/AUTO DIFF WBC: CPT | Performed by: EMERGENCY MEDICINE

## 2025-06-26 PROCEDURE — 63600175 PHARM REV CODE 636 W HCPCS: Performed by: EMERGENCY MEDICINE

## 2025-06-26 PROCEDURE — 83690 ASSAY OF LIPASE: CPT | Performed by: EMERGENCY MEDICINE

## 2025-06-26 PROCEDURE — 80053 COMPREHEN METABOLIC PANEL: CPT | Performed by: EMERGENCY MEDICINE

## 2025-06-26 RX ORDER — FAMOTIDINE 10 MG/ML
20 INJECTION, SOLUTION INTRAVENOUS
Status: COMPLETED | OUTPATIENT
Start: 2025-06-26 | End: 2025-06-26

## 2025-06-26 RX ADMIN — OXYCODONE 15 MG: 5 TABLET ORAL at 03:06

## 2025-06-26 RX ADMIN — PROMETHAZINE HYDROCHLORIDE 12.5 MG: 25 INJECTION INTRAMUSCULAR; INTRAVENOUS at 03:06

## 2025-06-26 RX ADMIN — SODIUM CHLORIDE 1000 ML: 9 INJECTION, SOLUTION INTRAVENOUS at 02:06

## 2025-06-26 RX ADMIN — FAMOTIDINE 20 MG: 10 INJECTION, SOLUTION INTRAVENOUS at 02:06

## 2025-06-26 NOTE — ED NOTES
Tasia Cardona, a 25 y.o. male presents to the ED with LUQ pain and n/v/d that began yesterday evening, concerned for pancreatitis flare. Pt attempted home promethazine w/o relief. Pt is frequent drinker, last had mimosas this morning. Pt pleasant, talkative, not actively vomiting. Pt denies getting blood work while waiting for room as he is a hard stick.     Pt resting comfortably. ED workup in progress. Safety measures in place. Denies further needs. Plan of care ongoing.      Chief Complaint   Patient presents with    Abdominal Pain    Emesis     LUQ pain, n/v/d onset last night. Attempted promethazine w/o relief. Drank mimosas this morning.      Review of patient's allergies indicates:   Allergen Reactions    Redford Swelling    Droperidol Hives    Morphine     Pecan nut Swelling    Penicillins Hives     Tolerates cephalosporins    Tolerated piperacillin/tazobactam 11/2023    Sulfa (sulfonamide antibiotics) Hives    Tolonium chloride(toluidine blue-o) Hives, Itching and Rash    Toradol [ketorolac] Rash     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     Past Surgical History:   Procedure Laterality Date    ENDOSCOPIC ULTRASOUND OF UPPER GASTROINTESTINAL TRACT N/A 10/23/2023    Procedure: ULTRASOUND, UPPER GI TRACT, ENDOSCOPIC;  Surgeon: Emil Villatoro MD;  Location: [address]);  Service: Endoscopy;  Laterality: N/A;    ENDOSCOPIC ULTRASOUND OF UPPER GASTROINTESTINAL TRACT N/A 7/24/2024    Procedure: ULTRASOUND, UPPER GI TRACT, ENDOSCOPIC;   Surgeon: Faustino Trujillo MD;  Location: [facility] (2ND FLR);  Service: Endoscopy;  Laterality: N/A;    ERCP N/A 10/23/2023    Procedure: ERCP (ENDOSCOPIC RETROGRADE CHOLANGIOPANCREATOGRAPHY);  Surgeon: Emil Villatoro MD;  Location: [facility] ENDO (2ND FLR);  Service: Endoscopy;  Laterality: N/A;    ESOPHAGOGASTRODUODENOSCOPY N/A 9/18/2023    Procedure: EGD (ESOPHAGOGASTRODUODENOSCOPY);  Surgeon: Kg Cleaning MD;  Location: [facility] (2ND FLR);  Service: Endoscopy;  Laterality: N/A;    ESOPHAGOGASTRODUODENOSCOPY N/A 3/8/2024    Procedure: EGD (ESOPHAGOGASTRODUODENOSCOPY);  Surgeon: Greg Love MD;  Location: [facility] (2ND FLR);  Service: Endoscopy;  Laterality: N/A;    ESOPHAGOGASTRODUODENOSCOPY N/A 7/24/2024    Procedure: EGD (ESOPHAGOGASTRODUODENOSCOPY);  Surgeon: Faustino Trujillo MD;  Location: [facility] (2ND FLR);  Service: Endoscopy;  Laterality: N/A;

## 2025-06-26 NOTE — ED PROVIDER NOTES
Encounter Date: 6/25/2025       History     Chief Complaint   Patient presents with    Abdominal Pain    Emesis     LUQ pain, n/v/d onset last night. Attempted promethazine w/o relief. Drank mimosas this morning.      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  He admits to drinking some mimosas this morning, and this afternoon had episode of vomiting followed by left upper quadrant pain typical of his previous episodes of pancreatitis.  He has had multiple episodes of vomiting since then and persistent pain, unable to hold down any p.o. intake.  He reports some blood streaks in his emesis similar to previous episodes, no gross blood, no blood in stool.  No fevers or other new complaints      Review of patient's allergies indicates:   Allergen Reactions    Jeddo Swelling    Droperidol Hives    Morphine     Pecan nut Swelling    Penicillins Hives     Tolerates cephalosporins    Tolerated piperacillin/tazobactam 11/2023    Sulfa (sulfonamide antibiotics) Hives    Tolonium chloride(toluidine blue-o) Hives, Itching and Rash    Toradol [ketorolac] Rash     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     Past Surgical History:   Procedure Laterality Date    ENDOSCOPIC ULTRASOUND OF UPPER GASTROINTESTINAL TRACT N/A 10/23/2023    Procedure:  ULTRASOUND, UPPER GI TRACT, ENDOSCOPIC;  Surgeon: Emil Villatoro MD;  Location: [facility] (2ND FLR);  Service: Endoscopy;  Laterality: N/A;    ENDOSCOPIC ULTRASOUND OF UPPER GASTROINTESTINAL TRACT N/A 7/24/2024    Procedure: ULTRASOUND, UPPER GI TRACT, ENDOSCOPIC;  Surgeon: Faustino Trujillo MD;  Location: [facility] (2ND FLR);  Service: Endoscopy;  Laterality: N/A;    ERCP N/A 10/23/2023    Procedure: ERCP (ENDOSCOPIC RETROGRADE CHOLANGIOPANCREATOGRAPHY);  Surgeon: Emil Villatoro MD;  Location: [facility] (2ND FLR);  Service: Endoscopy;  Laterality: N/A;    ESOPHAGOGASTRODUODENOSCOPY N/A 9/18/2023    Procedure: EGD (ESOPHAGOGASTRODUODENOSCOPY);  Surgeon: Kg Cleaning MD;  Location: [facility] (2ND FLR);  Service: Endoscopy;  Laterality: N/A;    ESOPHAGOGASTRODUODENOSCOPY N/A 3/8/2024    Procedure: EGD (ESOPHAGOGASTRODUODENOSCOPY);  Surgeon: Greg Love MD;  Location: [facility] (2ND FLR);  Service: Endoscopy;  Laterality: N/A;    ESOPHAGOGASTRODUODENOSCOPY N/A 7/24/2024    Procedure: EGD (ESOPHAGOGASTRODUODENOSCOPY);  Surgeon: Faustino Trujillo MD;  Location: [facility] (2ND FLR);  Service: Endoscopy;  Laterality: N/A;     Family History   Problem Relation Name Age of Onset    Pancreatitis Mother      Cancer Mother      Ovarian cancer Mother      No Known Problems Father      Cancer Brother      Breast cancer Maternal Grandmother       Social History[1]  Review of Systems   Constitutional:  Negative for fever.   HENT:  Negative for congestion.    Eyes:  Negative for redness.   Respiratory:  Negative for shortness of breath.    Cardiovascular:  Negative for chest pain.   Gastrointestinal:  Positive for abdominal pain, nausea and vomiting.   Genitourinary:  Negative for dysuria.   Skin:  Negative for rash.   Neurological:  Negative for headaches.   Psychiatric/Behavioral:  Negative for confusion.        Physical Exam     Initial Vitals [06/25/25 2359]   BP Pulse Resp Temp SpO2   116/78 106 14 98.1 °F  (36.7 °C) 97 %      MAP       --         Physical Exam    Nursing note and vitals reviewed.  Constitutional: He appears well-developed and well-nourished. He is not diaphoretic. No distress.   HENT:   Head: Normocephalic and atraumatic.   Eyes: Conjunctivae are normal. No scleral icterus.   Neck: Neck supple.   Cardiovascular:  Normal rate, regular rhythm, normal heart sounds and intact distal pulses.           No murmur heard.  Pulmonary/Chest: Breath sounds normal. No respiratory distress. He has no wheezes. He has no rhonchi. He has no rales.   Abdominal: Abdomen is soft. There is abdominal tenderness.   Mild left upper quadrant tenderness There is no rebound and no guarding.   Musculoskeletal:         General: No edema.      Cervical back: Neck supple.     Neurological: He is alert and oriented to person, place, and time.   Skin: Skin is warm and dry.   Psychiatric: He has a normal mood and affect.         ED Course   Procedures  Labs Reviewed   COMPREHENSIVE METABOLIC PANEL - Abnormal       Result Value    Sodium 138      Potassium 3.8      Chloride 106      CO2 17 (*)     Glucose 111 (*)     BUN 6      Creatinine 0.8      Calcium 8.4 (*)     Protein Total 8.1      Albumin 3.8      Bilirubin Total 0.3      ALP 90      AST 62 (*)     ALT 63 (*)     Anion Gap 15      eGFR >60     CBC WITH DIFFERENTIAL - Abnormal    WBC 6.06      RBC 5.20      HGB 14.2      HCT 44.6      MCV 86      MCH 27.3      MCHC 31.8 (*)     RDW 20.0 (*)     Platelet Count 238      MPV 9.8      Nucleated RBC 0      Neut % 29.5 (*)     Lymph % 52.8 (*)     Mono % 11.9      Eos % 4.6      Basophil % 1.0      Imm Grans % 0.2      Neut # 1.79 (*)     Lymph # 3.20      Mono # 0.72      Eos # 0.28      Baso # 0.06      Imm Grans # 0.01     LIPASE - Normal    Lipase Level 13     CBC W/ AUTO DIFFERENTIAL    Narrative:     The following orders were created for panel order CBC auto differential.  Procedure                               Abnormality          Status                     ---------                               -----------         ------                     CBC with Differential[[id number]]       Abnormal            Final result                 Please view results for these tests on the individual orders.          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  Initial onset was earlier today, patient admits to drinking alcohol this morning and after vomiting this afternoon started having left upper quadrant pain typical of his previous pancreatitis episodes.  He had some blood streaks in his emesis also similar to previous episodes, but no blood in stool, no fevers or other new complaints.  No recent HIV related illness, he is compliant with HAART meds.  On arrival patient resting comfortably with mild tachycardia but otherwise normal vitals, with mild left upper quadrant tenderness on exam but no acute abdomen.  Differential diagnosis includes chronic pancreatitis flare, dehydration, electrolyte abnormality, alcohol abuse, Corinne-Chauhan tear.    Per extensive chart review patient has numerous similar recent presentations due to alcohol abuse and chronic pancreatitis, including 7 in the past 3 weeks.  He continues to drink alcohol despite previous recommendations on this causing his recurrent pancreatitis pain and vomiting.  He previously got doses of IV Dilaudid because  he is reportedly allergic to morphine and Toradol, and there is concern for opiate dependence now.  On recent ED visit he got IV antiemetics but p.o. Dilaudid, which I feel is a reasonable course of action to repeat at this time.    Basic labs with slightly elevated transaminases slightly worse than previous, but normal renal function, no electrolyte abnormality, and no elevated lipase.  Normal bilirubin, no sign of gallstone pancreatitis, no leukocytosis or fever to suggest intra-abdominal infection, no indication for repeat CT at this time.  Patient's nausea resolved after IV Phenergan, and he got a dose of p.o. oxycodone and remained resting comfortably with no significant tenderness on reassessment.  Patient is stable for outpatient management of his chronic pancreatitis flare, he was once again extensively counseled on need for alcohol cessation and given information on outpatient rehab resources.  He states he already has enough Rx for antiemetics, and is awaiting GI follow up appointment      Amount and/or Complexity of Data Reviewed  Labs: ordered.    Risk  Prescription drug management.                                      Clinical Impression:  Final diagnoses:  [K86.0] Alcohol-induced chronic pancreatitis (Primary)  [R10.12] Left upper quadrant abdominal pain          ED Disposition Condition    Discharge Stable          ED Prescriptions    None       Follow-up Information       Follow up With Specialties Details Why Contact [facility] GASTROENTEROLOGY Gastroenterology Schedule an appointment as soon as possible for a visit in 3 days  [address] [place] [id number]  [phone]          [facility] MDM  [facility] Module  Jun 27 2025 10:06 PM [Rogelio San]  Data:  - Test/documents/historian: 3 tests ordered  3 external notes reviewed (Previous ED visits)  Problems: Acute pancreatitis (high)  Additional encounter diagnoses: Alcohol-induced chronic pancreatitis  Risk:  oxyCODONE immediate release tablet + 2 more (Rx drug management)             [1]   Social History  Tobacco Use    Smoking status: Former     Current packs/day: 0.10     Average packs/day: 0.1 packs/day for 4.5 years (0.4 ttl pk-yrs)     Types: Cigarettes     Start date: 2021    Smokeless tobacco: Never   Substance Use Topics    Alcohol use: Yes     Alcohol/week: 4.0 standard drinks of alcohol     Types: 4 Shots of liquor per week    Drug use: Yes     Types: Marijuana        Rogelio San MD  06/27/25 2622

## 2025-06-29 ENCOUNTER — NURSE TRIAGE (OUTPATIENT)
Dept: ADMINISTRATIVE | Facility: CLINIC | Age: 25
End: 2025-06-29
Payer: MEDICAID

## 2025-06-29 ENCOUNTER — HOSPITAL ENCOUNTER (EMERGENCY)
Facility: HOSPITAL | Age: 25
Discharge: LEFT BEFORE DISCHARGE | End: 2025-06-29
Attending: EMERGENCY MEDICINE
Payer: MEDICAID

## 2025-06-29 VITALS
OXYGEN SATURATION: 96 % | SYSTOLIC BLOOD PRESSURE: 117 MMHG | WEIGHT: 170 LBS | HEIGHT: 70 IN | DIASTOLIC BLOOD PRESSURE: 70 MMHG | TEMPERATURE: 98 F | RESPIRATION RATE: 18 BRPM | BODY MASS INDEX: 24.34 KG/M2 | HEART RATE: 109 BPM

## 2025-06-29 DIAGNOSIS — R10.13 EPIGASTRIC PAIN: Primary | ICD-10-CM

## 2025-06-29 PROCEDURE — 99281 EMR DPT VST MAYX REQ PHY/QHP: CPT

## 2025-06-29 RX ORDER — ONDANSETRON 4 MG/1
4 TABLET, ORALLY DISINTEGRATING ORAL
Status: DISCONTINUED | OUTPATIENT
Start: 2025-06-29 | End: 2025-06-29 | Stop reason: HOSPADM

## 2025-06-29 RX ORDER — OXYCODONE HYDROCHLORIDE 5 MG/1
15 TABLET ORAL
Refills: 0 | Status: DISCONTINUED | OUTPATIENT
Start: 2025-06-29 | End: 2025-06-29 | Stop reason: HOSPADM

## 2025-06-30 NOTE — ED TRIAGE NOTES
Pt c/o bilat upper abd pain and n/v/d that began yesterday. Pt has hx of pancreatitis and states this feels like a flare up. Pt states this began after eating fried chicken. Pt denies CP, SOB, weakness, dizziness, numbness, and tingling.

## 2025-06-30 NOTE — ED PROVIDER NOTES
"Chief Complaint   Emesis (N/V, abd pain since yesterday. Hx of pancreatitis. )      History Of Present Illness   Tasia Cardona is a 25 y.o. male presenting with recurrent abdominal pain and vomiting which he attributes to his pancreatitis.  The patient admits to drinking mimosas today.  He tends to present to the ED after drinking alcohol, complaining of epigastric pain.  Generally, in most recent visits, his lipase has been normal.  I am very familiar with the patient.        Review of patient's allergies indicates:   Allergen Reactions    [place] Swelling    Droperidol Hives    Morphine     Pecan nut Swelling    Penicillins Hives     Tolerates cephalosporins    Tolerated piperacillin/tazobactam 11/2023    Sulfa (sulfonamide antibiotics) Hives    Tolonium chloride(toluidine blue-o) Hives, Itching and Rash    Toradol [ketorolac] Rash       Medications Ordered Prior to Encounter[1]    Past History   As per HPI and below:  Past Medical History[2]  Past Surgical History[3]    Social History[4]    Family History   Problem Relation Name Age of Onset    Pancreatitis Mother      Cancer Mother      Ovarian cancer Mother      No Known Problems Father      Cancer Brother      Breast cancer Maternal Grandmother         Physical Exam     Vitals:    06/29/25 1916   BP: 117/70   Pulse: 109   Resp: 18   Temp: 98 °F (36.7 °C)   TempSrc: Oral   SpO2: 96%   Weight: 77.1 kg (170 lb)   Height: 5' 10" (1.778 m)     Appearance: No acute distress.  Skin: No rashes seen.  Good turgor.  No abrasions.  No ecchymoses.   Chest: Clear to auscultation bilaterally.  Good air movement.  No wheezes.  No rhonchi.  Cardiovascular: Regular rate and rhythm.  No murmurs. No gallops. No rubs.  Abdomen: Soft.  Not distended.  Nontender.  No guarding.  No rebound.  Musculoskeletal: Good range of motion all joints.  No deformities.  Neck supple.  No meningismus.  Neurologic: Motor intact.  Sensation intact.  Cranial nerves intact.  Mental Status:  Alert and " oriented x 3.  Appropriate, conversant.      Initial MDM   Nausea, vomiting and abdominal pain after drinking alcohol.  His exam is not impressive.  Reviewing his chart shows that has become clear that he tends to present to the ED asking for strong amounts of intravenous narcotics.  I have written for them in previous visits.  Usually, in recent visits, his lipase is not elevated.  Reviewing the chart in totality, I agree with previous physicians interpretation that he may be developing characteristics of opiate dependence.  Therefore, I will avoid intravenous opiates.  I will give oral while awaiting laboratory results.  He can have oral antiemetics as well.       Medications Given     Medications   ondansetron disintegrating tablet 4 mg (has no administration in time range)   oxyCODONE immediate release tablet 15 mg (has no administration in time range)       Results and Course   Abnormal Labs Reviewed - No abnormal labs to display    Imaging Results    None                      MDM, Impression and Plan   25 y.o. male with recurrent abdominal pain, alcohol use, likely opiate dependence.  He eloped after his initial evaluation.         Final diagnoses:  [R10.13] Epigastric pain (Primary)        ED Disposition Condition    Eloped                     [1]   No current facility-administered medications on file prior to encounter.     Current Outpatient Medications on File Prior to Encounter   Medication Sig Dispense Refill    wfsxogyui-qdnzxxsy-hbggvma ala (BIKTARVY) -25 mg (25 kg or greater) Take 1 tablet by mouth once daily. 30 tablet 5    famotidine (PEPCID) 20 MG tablet Take 1 tablet (20 mg total) by mouth 2 (two) times daily as needed for Heartburn. 20 tablet 0    metoclopramide HCl (REGLAN) 10 MG tablet Take 1 tablet (10 mg total) by mouth every 6 (six) hours. 30 tablet 0    omeprazole (PRILOSEC) 20 MG capsule Take 1 capsule (20 mg total) by mouth once daily. 30 capsule 11    oxyCODONE-acetaminophen  (PERCOCET) 5-325 mg per tablet Take 1 tablet by mouth every 6 (six) hours as needed for Pain. 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   [3]   Past Surgical History:  Procedure Laterality Date    ENDOSCOPIC ULTRASOUND OF UPPER GASTROINTESTINAL TRACT N/A 10/23/2023    Procedure: ULTRASOUND, UPPER GI TRACT, ENDOSCOPIC;  Surgeon: Emil Villatoro MD;  Location: [address]);  Service: Endoscopy;  Laterality: N/A;    ENDOSCOPIC ULTRASOUND OF UPPER GASTROINTESTINAL TRACT N/A 7/24/2024    Procedure: ULTRASOUND, UPPER GI TRACT, ENDOSCOPIC;  Surgeon: Faustino Trujillo MD;  Location: [address]);  Service: Endoscopy;  Laterality: N/A;    ERCP N/A 10/23/2023    Procedure: ERCP (ENDOSCOPIC RETROGRADE CHOLANGIOPANCREATOGRAPHY);  Surgeon: Emil Villatoro MD;  Location: [address]);  Service: Endoscopy;  Laterality: N/A;    ESOPHAGOGASTRODUODENOSCOPY N/A 9/18/2023    Procedure: EGD (ESOPHAGOGASTRODUODENOSCOPY);  Surgeon: Kg Cleaning MD;  Location: [address]);  Service: Endoscopy;  Laterality: N/A;    ESOPHAGOGASTRODUODENOSCOPY N/A 3/8/2024    Procedure: EGD (ESOPHAGOGASTRODUODENOSCOPY);  Surgeon: Greg Love MD;  Location: [address]);  Service: Endoscopy;  Laterality: N/A;    ESOPHAGOGASTRODUODENOSCOPY N/A 7/24/2024    Procedure: EGD (ESOPHAGOGASTRODUODENOSCOPY);  Surgeon: Faustino Trujillo MD;  Location: [facility] (2ND FLR);  Service: Endoscopy;  Laterality: N/A;   [4]   Social  History  Tobacco Use    Smoking status: Former     Current packs/day: 0.10     Average packs/day: 0.1 packs/day for 4.5 years (0.4 ttl pk-yrs)     Types: Cigarettes     Start date: 2021    Smokeless tobacco: Never   Vaping Use    Vaping status: Never Used   Substance Use Topics    Alcohol use: Yes     Alcohol/week: 4.0 standard drinks of alcohol     Types: 4 Shots of liquor per week    Drug use: Yes     Types: Marijuana        Srini Rick MD  06/29/25 1439

## 2025-06-30 NOTE — TELEPHONE ENCOUNTER
Patient has checked into the ER at [facility] but is in the parking lot and asking what the wait time is. Advised patient to ask where he checked in for an update but explained the ER will see people in order of severity and encouraged him to wait to be seen. Instructed to call back with additional questions or worsening of symptoms. Patient verbalized understanding.       Reason for Disposition   General information question, no triage required and triager able to answer question    Protocols used: Information Only Call - No Triage-A-

## 2025-06-30 NOTE — ED NOTES
Pt comes to triage desk and states that he has to leave because his sister left his dog out. States that he will be back.

## 2025-07-10 ENCOUNTER — HOSPITAL ENCOUNTER (EMERGENCY)
Facility: OTHER | Age: 25
Discharge: HOME | End: 2025-07-11
Attending: EMERGENCY MEDICINE
Payer: MEDICAID

## 2025-07-10 DIAGNOSIS — R10.9 CHRONIC ABDOMINAL PAIN: Primary | ICD-10-CM

## 2025-07-10 DIAGNOSIS — K29.21 CHRONIC ALCOHOLIC GASTRITIS WITH HEMORRHAGE: ICD-10-CM

## 2025-07-10 DIAGNOSIS — R10.13 EPIGASTRIC ABDOMINAL PAIN: ICD-10-CM

## 2025-07-10 DIAGNOSIS — R11.2 NAUSEA AND VOMITING, UNSPECIFIED VOMITING TYPE: ICD-10-CM

## 2025-07-10 DIAGNOSIS — G89.29 CHRONIC ABDOMINAL PAIN: Primary | ICD-10-CM

## 2025-07-10 DIAGNOSIS — Z87.19 HISTORY OF PANCREATITIS: ICD-10-CM

## 2025-07-10 LAB
ABSOLUTE EOSINOPHIL (OHS): 0.26 K/UL
ABSOLUTE MONOCYTE (OHS): 0.54 K/UL (ref 0.3–1)
ABSOLUTE NEUTROPHIL COUNT (OHS): 3.44 K/UL (ref 1.8–7.7)
ALBUMIN SERPL BCP-MCNC: 4.1 G/DL (ref 3.5–5.2)
ALP SERPL-CCNC: 99 UNIT/L (ref 40–150)
ALT SERPL W/O P-5'-P-CCNC: 67 UNIT/L (ref 10–44)
ANION GAP (OHS): 16 MMOL/L (ref 8–16)
AST SERPL-CCNC: 54 UNIT/L (ref 11–45)
BASOPHILS # BLD AUTO: 0.05 K/UL
BASOPHILS NFR BLD AUTO: 0.7 %
BILIRUB SERPL-MCNC: 0.3 MG/DL (ref 0.1–1)
BUN SERPL-MCNC: 13 MG/DL (ref 6–20)
CALCIUM SERPL-MCNC: 8.6 MG/DL (ref 8.7–10.5)
CHLORIDE SERPL-SCNC: 106 MMOL/L (ref 95–110)
CO2 SERPL-SCNC: 21 MMOL/L (ref 23–29)
CREAT SERPL-MCNC: 1.2 MG/DL (ref 0.5–1.4)
ERYTHROCYTE [DISTWIDTH] IN BLOOD BY AUTOMATED COUNT: 19.2 % (ref 11.5–14.5)
ETHANOL SERPL-MCNC: 248 MG/DL
GFR SERPLBLD CREATININE-BSD FMLA CKD-EPI: >60 ML/MIN/1.73/M2
GLUCOSE SERPL-MCNC: 121 MG/DL (ref 70–110)
HCT VFR BLD AUTO: 44.3 % (ref 40–54)
HGB BLD-MCNC: 14.1 GM/DL (ref 14–18)
IMM GRANULOCYTES # BLD AUTO: 0.01 K/UL (ref 0–0.04)
IMM GRANULOCYTES NFR BLD AUTO: 0.1 % (ref 0–0.5)
LIPASE SERPL-CCNC: 23 U/L (ref 4–60)
LYMPHOCYTES # BLD AUTO: 3.35 K/UL (ref 1–4.8)
MAGNESIUM SERPL-MCNC: 2.4 MG/DL (ref 1.6–2.6)
MCH RBC QN AUTO: 27.1 PG (ref 27–31)
MCHC RBC AUTO-ENTMCNC: 31.8 G/DL (ref 32–36)
MCV RBC AUTO: 85 FL (ref 82–98)
NUCLEATED RBC (/100WBC) (OHS): 0 /100 WBC
PLATELET # BLD AUTO: 275 K/UL (ref 150–450)
PMV BLD AUTO: 9.2 FL (ref 9.2–12.9)
POTASSIUM SERPL-SCNC: 3.6 MMOL/L (ref 3.5–5.1)
PROT SERPL-MCNC: 8.4 GM/DL (ref 6–8.4)
RBC # BLD AUTO: 5.2 M/UL (ref 4.6–6.2)
RELATIVE EOSINOPHIL (OHS): 3.4 %
RELATIVE LYMPHOCYTE (OHS): 43.8 % (ref 18–48)
RELATIVE MONOCYTE (OHS): 7.1 % (ref 4–15)
RELATIVE NEUTROPHIL (OHS): 44.9 % (ref 38–73)
SODIUM SERPL-SCNC: 143 MMOL/L (ref 136–145)
WBC # BLD AUTO: 7.65 K/UL (ref 3.9–12.7)

## 2025-07-10 PROCEDURE — 83735 ASSAY OF MAGNESIUM: CPT | Performed by: EMERGENCY MEDICINE

## 2025-07-10 PROCEDURE — 96375 TX/PRO/DX INJ NEW DRUG ADDON: CPT

## 2025-07-10 PROCEDURE — 25000003 PHARM REV CODE 250: Performed by: EMERGENCY MEDICINE

## 2025-07-10 PROCEDURE — 80053 COMPREHEN METABOLIC PANEL: CPT | Performed by: EMERGENCY MEDICINE

## 2025-07-10 PROCEDURE — 96361 HYDRATE IV INFUSION ADD-ON: CPT

## 2025-07-10 PROCEDURE — 63600175 PHARM REV CODE 636 W HCPCS: Performed by: EMERGENCY MEDICINE

## 2025-07-10 PROCEDURE — 85025 COMPLETE CBC W/AUTO DIFF WBC: CPT | Performed by: EMERGENCY MEDICINE

## 2025-07-10 PROCEDURE — 82077 ASSAY SPEC XCP UR&BREATH IA: CPT | Performed by: EMERGENCY MEDICINE

## 2025-07-10 PROCEDURE — 83690 ASSAY OF LIPASE: CPT | Performed by: EMERGENCY MEDICINE

## 2025-07-10 PROCEDURE — 99284 EMERGENCY DEPT VISIT MOD MDM: CPT | Mod: 25

## 2025-07-10 RX ORDER — ONDANSETRON HYDROCHLORIDE 2 MG/ML
4 INJECTION, SOLUTION INTRAVENOUS
Status: COMPLETED | OUTPATIENT
Start: 2025-07-10 | End: 2025-07-10

## 2025-07-10 RX ORDER — PANTOPRAZOLE SODIUM 40 MG/10ML
40 INJECTION, POWDER, LYOPHILIZED, FOR SOLUTION INTRAVENOUS
Status: COMPLETED | OUTPATIENT
Start: 2025-07-10 | End: 2025-07-10

## 2025-07-10 RX ADMIN — PANTOPRAZOLE SODIUM 40 MG: 40 INJECTION, POWDER, FOR SOLUTION INTRAVENOUS at 11:07

## 2025-07-10 RX ADMIN — SODIUM CHLORIDE 1000 ML: 0.9 INJECTION, SOLUTION INTRAVENOUS at 11:07

## 2025-07-10 RX ADMIN — ONDANSETRON 4 MG: 2 INJECTION INTRAMUSCULAR; INTRAVENOUS at 11:07

## 2025-07-11 VITALS
BODY MASS INDEX: 24.34 KG/M2 | SYSTOLIC BLOOD PRESSURE: 112 MMHG | HEIGHT: 70 IN | HEART RATE: 90 BPM | TEMPERATURE: 98 F | WEIGHT: 170 LBS | OXYGEN SATURATION: 99 % | DIASTOLIC BLOOD PRESSURE: 72 MMHG | RESPIRATION RATE: 18 BRPM

## 2025-07-11 PROCEDURE — 96365 THER/PROPH/DIAG IV INF INIT: CPT

## 2025-07-11 PROCEDURE — 63600175 PHARM REV CODE 636 W HCPCS: Performed by: EMERGENCY MEDICINE

## 2025-07-11 PROCEDURE — 96361 HYDRATE IV INFUSION ADD-ON: CPT

## 2025-07-11 PROCEDURE — 25000003 PHARM REV CODE 250: Performed by: EMERGENCY MEDICINE

## 2025-07-11 RX ORDER — PROMETHAZINE HYDROCHLORIDE 25 MG/1
25 TABLET ORAL EVERY 6 HOURS PRN
Qty: 15 TABLET | Refills: 0 | Status: SHIPPED | OUTPATIENT
Start: 2025-07-11

## 2025-07-11 RX ADMIN — PROMETHAZINE HYDROCHLORIDE 12.5 MG: 25 INJECTION INTRAMUSCULAR; INTRAVENOUS at 12:07

## 2025-07-11 NOTE — ED PROVIDER NOTES
Encounter Date: 7/10/2025       History     Chief Complaint   Patient presents with    Abdominal Pain     C/o LUQ abdominal pain with nausea/vomiting, reports Hx of pancreatitis x 2 days      [facility]    24 yo male with chronic abdominal pain, ~22 ER visits for same since March 2025, presents via EMS? to [facility] ER with abdominal pain and nausea/vomiting, ongoing for 2 days.  Abdominal pain is primarily LUQ.  Patient reports a history of chronic pancreatitis and splenic vein thrombosis.  He denies recent EtOH and states he has not been able to keep anything down all day.      PMH: undetectable HIV, polycythemia vera, splenic vein thrombosis (previously stented) not currently anticoagulated, asthma    Smokes tobacco.        Review of patient's allergies indicates:   Allergen Reactions    [place] Swelling    Droperidol Hives    Morphine     Pecan nut Swelling    Penicillins Hives     Tolerates cephalosporins    Tolerated piperacillin/tazobactam 11/2023    Sulfa (sulfonamide antibiotics) Hives    Tolonium chloride(toluidine blue-o) Hives, Itching and Rash    Toradol [ketorolac] Rash     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     Past Surgical History:   Procedure Laterality Date    ENDOSCOPIC ULTRASOUND OF UPPER GASTROINTESTINAL TRACT N/A 10/23/2023    Procedure: ULTRASOUND, UPPER GI TRACT, ENDOSCOPIC;  Surgeon: Emil Villatoro MD;  Location: [facility] ([address] [place] [address]);  Service: Endoscopy;  Laterality: N/A;    ENDOSCOPIC ULTRASOUND OF UPPER  GASTROINTESTINAL TRACT N/A 7/24/2024    Procedure: ULTRASOUND, UPPER GI TRACT, ENDOSCOPIC;  Surgeon: Faustino Trujillo MD;  Location: [facility] ENDO (2ND FLR);  Service: Endoscopy;  Laterality: N/A;    ERCP N/A 10/23/2023    Procedure: ERCP (ENDOSCOPIC RETROGRADE CHOLANGIOPANCREATOGRAPHY);  Surgeon: Emil Villatoro MD;  Location: [facility] ENDO (2ND FLR);  Service: Endoscopy;  Laterality: N/A;    ESOPHAGOGASTRODUODENOSCOPY N/A 9/18/2023    Procedure: EGD (ESOPHAGOGASTRODUODENOSCOPY);  Surgeon: Kg Cleaning MD;  Location: [facility] ENDO (2ND FLR);  Service: Endoscopy;  Laterality: N/A;    ESOPHAGOGASTRODUODENOSCOPY N/A 3/8/2024    Procedure: EGD (ESOPHAGOGASTRODUODENOSCOPY);  Surgeon: Greg Love MD;  Location: [facility] ENDO (2ND FLR);  Service: Endoscopy;  Laterality: N/A;    ESOPHAGOGASTRODUODENOSCOPY N/A 7/24/2024    Procedure: EGD (ESOPHAGOGASTRODUODENOSCOPY);  Surgeon: Faustino Trujillo MD;  Location: [facility] ENDO (2ND FLR);  Service: Endoscopy;  Laterality: N/A;     Family History   Problem Relation Name Age of Onset    Pancreatitis Mother      Cancer Mother      Ovarian cancer Mother      No Known Problems Father      Cancer Brother      Breast cancer Maternal Grandmother       Social History[1]  Review of Systems   Gastrointestinal:  Positive for abdominal pain, nausea and vomiting.       Physical Exam     Initial Vitals [07/10/25 2231]   BP Pulse Resp Temp SpO2   113/76 (!) 115 18 98.1 °F (36.7 °C) 97 %      MAP       --         Physical Exam    Nursing note and vitals reviewed.  Constitutional: He appears well-developed and well-nourished. He is not diaphoretic.   Awake, alert, nontoxic   HENT:   Head: Normocephalic and atraumatic. Mouth/Throat: Oropharynx is clear and moist.   Eyes: Conjunctivae and EOM are normal. Pupils are equal, round, and reactive to light.   Neck: Neck supple.   Normal range of motion.  Cardiovascular:  Regular rhythm, normal heart sounds and intact distal pulses.           No murmur  heard.  Borderline tachycardic   Pulmonary/Chest: Breath sounds normal. No respiratory distress. He has no wheezes. He has no rhonchi. He has no rales.   Abdominal: Abdomen is soft. There is abdominal tenderness (LUQ, epigastric). There is no rebound and no guarding.   Musculoskeletal:         General: Normal range of motion.      Cervical back: Normal range of motion and neck supple.     Neurological: He is alert and oriented to person, place, and time.   Moving all extremities   Skin: Skin is warm and dry.   Psychiatric: His behavior is normal.         ED Course   Procedures  Labs Reviewed   COMPREHENSIVE METABOLIC PANEL - Abnormal       Result Value    Sodium 143      Potassium 3.6      Chloride 106      CO2 21 (*)     Glucose 121 (*)     BUN 13      Creatinine 1.2      Calcium 8.6 (*)     Protein Total 8.4      Albumin 4.1      Bilirubin Total 0.3      ALP 99      AST 54 (*)     ALT 67 (*)     Anion Gap 16      eGFR >60     ALCOHOL,MEDICAL (ETHANOL) - Abnormal    Alcohol, Serum 248 (*)    CBC WITH DIFFERENTIAL - Abnormal    WBC 7.65      RBC 5.20      HGB 14.1      HCT 44.3      MCV 85      MCH 27.1      MCHC 31.8 (*)     RDW 19.2 (*)     Platelet Count 275      MPV 9.2      Nucleated RBC 0      Neut % 44.9      Lymph % 43.8      Mono % 7.1      Eos % 3.4      Basophil % 0.7      Imm Grans % 0.1      Neut # 3.44      Lymph # 3.35      Mono # 0.54      Eos # 0.26      Baso # 0.05      Imm Grans # 0.01     LIPASE - Normal    Lipase Level 23     MAGNESIUM - Normal    Magnesium  2.4     CBC W/ AUTO DIFFERENTIAL    Narrative:     The following orders were created for panel order CBC auto differential.  Procedure                               Abnormality         Status                     ---------                               -----------         ------                     CBC with Differential[[id number]]       Abnormal            Final result                 Please view results for these tests on the individual  orders.          Imaging Results    None          Medications   sodium chloride 0.9% bolus 1,000 mL 1,000 mL (0 mLs Intravenous Stopped 7/11/25 0037)   ondansetron injection 4 mg (4 mg Intravenous Given 7/10/25 2305)   pantoprazole injection 40 mg (40 mg Intravenous Given 7/10/25 2305)   promethazine 12.5 mg in 0.9% NaCl 50 mL IVPB (0 mg Intravenous Stopped 7/11/25 0134)     Medical Decision Making  25 y.o. male with abdominal pain and nausea/vomiting. Frequent visits for same.    Ddx includes esophagitis/gastritis, PUD, pancreatitis, dehydration, CHARY, other.    CBC, BMP, lipase reassuring.     EtOH 248, likely related to mild elevation of AST/ALT.    I suspect alcoholic gastritis.    I tx'ed patient with IV protonix 40mg, IV zofran 4mg, IV NS 1L, and IV promethazine 12.5mg.    Patient never had vomiting in the ER and tolerated PO ice chips.    Patient did request narcotics; however, given prior providers' concern for narcotic dependence, I have deferred at this time.    D/c'ed.      Amount and/or Complexity of Data Reviewed  Labs: ordered.    Risk  Prescription drug management.                                      Clinical Impression:  Final diagnoses:  [R10.9, G89.29] Chronic abdominal pain (Primary)  [K29.21] Chronic alcoholic gastritis with hemorrhage  [R10.13] Epigastric abdominal pain  [Z87.19] History of pancreatitis  [R11.2] Nausea and vomiting, unspecified vomiting type          ED Disposition Condition    Discharge Stable          ED Prescriptions       Medication Sig Dispense Start Date End Date Auth. Provider    promethazine (PHENERGAN) 25 MG tablet Take 1 tablet (25 mg total) by mouth every 6 (six) hours as needed for Nausea. 15 tablet 7/11/2025 -- Alyson Hilliard MD          Follow-up Information       Follow up With Specialties Details Why Contact Info    [facility], [facility]  As needed [address]  [place] [address]  [phone]            [facility]  MDM  MDCalc [facility] Module  Jul 11 2025 9:26 AM [Alyson Hilliard]  Data:  - Test/documents/historian: 3+ tests ordered  Problems: Concern for Alcohol Intoxication, Concern for Gastrointestinal Bleeding  Additional encounter diagnoses: Chronic abdominal pain, Chronic alcoholic gastritis with hemorrhage, History of pancreatitis, Nausea and vomiting, unspecified vomiting type  Risk: no evidence of dehydration to support hospitalization (high)             [1]   Social History  Tobacco Use    Smoking status: Former     Current packs/day: 0.10     Average packs/day: 0.1 packs/day for 4.5 years (0.5 ttl pk-yrs)     Types: Cigarettes     Start date: 2021    Smokeless tobacco: Never   Vaping Use    Vaping status: Never Used   Substance Use Topics    Alcohol use: Yes     Alcohol/week: 4.0 standard drinks of alcohol     Types: 4 Shots of liquor per week    Drug use: Yes     Types: Marijuana        Alyson Hilliard MD  07/11/25 0961

## 2025-07-17 ENCOUNTER — HOSPITAL ENCOUNTER (EMERGENCY)
Facility: OTHER | Age: 25
Discharge: HOME | End: 2025-07-17
Attending: EMERGENCY MEDICINE
Payer: MEDICAID

## 2025-07-17 VITALS
BODY MASS INDEX: 24.34 KG/M2 | DIASTOLIC BLOOD PRESSURE: 81 MMHG | WEIGHT: 170 LBS | HEIGHT: 70 IN | RESPIRATION RATE: 18 BRPM | HEART RATE: 72 BPM | TEMPERATURE: 98 F | SYSTOLIC BLOOD PRESSURE: 116 MMHG | OXYGEN SATURATION: 98 %

## 2025-07-17 DIAGNOSIS — R10.9 ABDOMINAL PAIN, UNSPECIFIED ABDOMINAL LOCATION: Primary | ICD-10-CM

## 2025-07-17 DIAGNOSIS — Z76.5 DRUG-SEEKING BEHAVIOR: ICD-10-CM

## 2025-07-17 LAB
ABSOLUTE EOSINOPHIL (OHS): 0.19 K/UL
ABSOLUTE MONOCYTE (OHS): 0.56 K/UL (ref 0.3–1)
ABSOLUTE NEUTROPHIL COUNT (OHS): 1.48 K/UL (ref 1.8–7.7)
ALBUMIN SERPL BCP-MCNC: 3.9 G/DL (ref 3.5–5.2)
ALP SERPL-CCNC: 86 UNIT/L (ref 40–150)
ALT SERPL W/O P-5'-P-CCNC: 49 UNIT/L (ref 10–44)
ANION GAP (OHS): 16 MMOL/L (ref 8–16)
AST SERPL-CCNC: 56 UNIT/L (ref 11–45)
BASOPHILS # BLD AUTO: 0.05 K/UL
BASOPHILS NFR BLD AUTO: 1 %
BILIRUB SERPL-MCNC: 0.3 MG/DL (ref 0.1–1)
BUN SERPL-MCNC: 12 MG/DL (ref 6–20)
CALCIUM SERPL-MCNC: 8.4 MG/DL (ref 8.7–10.5)
CHLORIDE SERPL-SCNC: 106 MMOL/L (ref 95–110)
CO2 SERPL-SCNC: 20 MMOL/L (ref 23–29)
CREAT SERPL-MCNC: 0.8 MG/DL (ref 0.5–1.4)
ERYTHROCYTE [DISTWIDTH] IN BLOOD BY AUTOMATED COUNT: 19.1 % (ref 11.5–14.5)
GFR SERPLBLD CREATININE-BSD FMLA CKD-EPI: >60 ML/MIN/1.73/M2
GLUCOSE SERPL-MCNC: 115 MG/DL (ref 70–110)
HCT VFR BLD AUTO: 43.3 % (ref 40–54)
HGB BLD-MCNC: 14 GM/DL (ref 14–18)
HOLD SPECIMEN: NORMAL
HOLD SPECIMEN: NORMAL
IMM GRANULOCYTES # BLD AUTO: 0 K/UL (ref 0–0.04)
IMM GRANULOCYTES NFR BLD AUTO: 0 % (ref 0–0.5)
LIPASE SERPL-CCNC: 21 U/L (ref 4–60)
LYMPHOCYTES # BLD AUTO: 2.83 K/UL (ref 1–4.8)
MCH RBC QN AUTO: 27.7 PG (ref 27–31)
MCHC RBC AUTO-ENTMCNC: 32.3 G/DL (ref 32–36)
MCV RBC AUTO: 86 FL (ref 82–98)
NUCLEATED RBC (/100WBC) (OHS): 0 /100 WBC
PLATELET # BLD AUTO: 231 K/UL (ref 150–450)
PMV BLD AUTO: 9 FL (ref 9.2–12.9)
POTASSIUM SERPL-SCNC: 4.5 MMOL/L (ref 3.5–5.1)
PROT SERPL-MCNC: 8.1 GM/DL (ref 6–8.4)
RBC # BLD AUTO: 5.05 M/UL (ref 4.6–6.2)
RELATIVE EOSINOPHIL (OHS): 3.7 %
RELATIVE LYMPHOCYTE (OHS): 55.4 % (ref 18–48)
RELATIVE MONOCYTE (OHS): 11 % (ref 4–15)
RELATIVE NEUTROPHIL (OHS): 28.9 % (ref 38–73)
SODIUM SERPL-SCNC: 142 MMOL/L (ref 136–145)
WBC # BLD AUTO: 5.11 K/UL (ref 3.9–12.7)

## 2025-07-17 PROCEDURE — 25000003 PHARM REV CODE 250: Performed by: EMERGENCY MEDICINE

## 2025-07-17 PROCEDURE — 96361 HYDRATE IV INFUSION ADD-ON: CPT

## 2025-07-17 PROCEDURE — 99285 EMERGENCY DEPT VISIT HI MDM: CPT | Mod: 25

## 2025-07-17 PROCEDURE — 25000003 PHARM REV CODE 250

## 2025-07-17 PROCEDURE — 96376 TX/PRO/DX INJ SAME DRUG ADON: CPT

## 2025-07-17 PROCEDURE — 96374 THER/PROPH/DIAG INJ IV PUSH: CPT

## 2025-07-17 PROCEDURE — 85025 COMPLETE CBC W/AUTO DIFF WBC: CPT | Performed by: EMERGENCY MEDICINE

## 2025-07-17 PROCEDURE — 63600175 PHARM REV CODE 636 W HCPCS

## 2025-07-17 PROCEDURE — 25500020 PHARM REV CODE 255: Performed by: EMERGENCY MEDICINE

## 2025-07-17 PROCEDURE — 63600175 PHARM REV CODE 636 W HCPCS: Performed by: EMERGENCY MEDICINE

## 2025-07-17 PROCEDURE — 80053 COMPREHEN METABOLIC PANEL: CPT | Performed by: EMERGENCY MEDICINE

## 2025-07-17 PROCEDURE — 83690 ASSAY OF LIPASE: CPT | Performed by: EMERGENCY MEDICINE

## 2025-07-17 PROCEDURE — 96375 TX/PRO/DX INJ NEW DRUG ADDON: CPT

## 2025-07-17 RX ORDER — OXYCODONE HYDROCHLORIDE 10 MG/1
10 TABLET ORAL
Refills: 0 | Status: COMPLETED | OUTPATIENT
Start: 2025-07-17 | End: 2025-07-17

## 2025-07-17 RX ORDER — FAMOTIDINE 10 MG/ML
20 INJECTION, SOLUTION INTRAVENOUS
Status: COMPLETED | OUTPATIENT
Start: 2025-07-17 | End: 2025-07-17

## 2025-07-17 RX ORDER — MORPHINE SULFATE 2 MG/ML
6 INJECTION, SOLUTION INTRAMUSCULAR; INTRAVENOUS
Refills: 0 | Status: DISCONTINUED | OUTPATIENT
Start: 2025-07-17 | End: 2025-07-17

## 2025-07-17 RX ORDER — ONDANSETRON HYDROCHLORIDE 2 MG/ML
4 INJECTION, SOLUTION INTRAVENOUS
Status: COMPLETED | OUTPATIENT
Start: 2025-07-17 | End: 2025-07-17

## 2025-07-17 RX ADMIN — ONDANSETRON 4 MG: 2 INJECTION INTRAMUSCULAR; INTRAVENOUS at 05:07

## 2025-07-17 RX ADMIN — ONDANSETRON 4 MG: 2 INJECTION INTRAMUSCULAR; INTRAVENOUS at 08:07

## 2025-07-17 RX ADMIN — SODIUM CHLORIDE 1000 ML: 9 INJECTION, SOLUTION INTRAVENOUS at 05:07

## 2025-07-17 RX ADMIN — IOHEXOL 100 ML: 350 INJECTION, SOLUTION INTRAVENOUS at 06:07

## 2025-07-17 RX ADMIN — OXYCODONE HYDROCHLORIDE 10 MG: 10 TABLET ORAL at 08:07

## 2025-07-17 RX ADMIN — FAMOTIDINE 20 MG: 10 INJECTION, SOLUTION INTRAVENOUS at 05:07

## 2025-07-17 RX ADMIN — OXYCODONE HYDROCHLORIDE 10 MG: 10 TABLET ORAL at 05:07

## 2025-07-17 NOTE — PROVIDER PROGRESS NOTES - EMERGENCY DEPT.
"Encounter Date: 7/17/2025    ED Physician Progress Notes        Physician Note:   25-year-old male well known to the department, history of chronic pancreatitis and alcohol abuse is presenting to the emergency department with current abdominal pain.  Patient has numerous allergies listed including to morphine however has tolerated this in the past and states that it just "does not work well for him".  Presents often requesting IV Dilaudid.  Tolerating p.o. here and tolerated his 10 mg oxycodone prior to my sign out.  The patient's want signed out to me pending re-evaluation and CT abdomen pelvis results.    Imaging Results          CT Abdomen Pelvis With IV Contrast NO Oral Contrast (Final result)    Result time 07/17/25 06:24:39    Final result by Adolfo Bobo MD (07/17/25 06:24:39)                 Impression:      Interval increase in size of peripancreatic fluid collection, with   location suggesting pancreatic pseudo cyst.  Close follow-up is   recommended.    Hepatomegaly and hepatic steatosis.    Mild splenomegaly.      Electronically signed by: Adolfo Bobo MD  Date:    07/17/2025  Time:    06:24             Narrative:    EXAMINATION:  CT ABDOMEN PELVIS WITH IV CONTRAST    CLINICAL HISTORY:  Epigastric pain;    TECHNIQUE:  Low dose axial images, sagittal and coronal reformations were obtained   from the lung bases to the pubic symphysis following the IV administration   of 100 mL of Omnipaque 350 .  Oral contrast was not given.    COMPARISON:  06/12/2025    FINDINGS:  There is a fluid collection between the anterior margin of the body of the   pancreas and the distal body of the stomach, measuring 2.9 x 3.7 cm,   increased from 2.5 cm.  No peripancreatic fat stranding.    The liver is enlarged and exhibits low attenuation in keeping with fatty   infiltration.  The spleen is mildly enlarged at 13 cm.  The gallbladder,   adrenal glands, and kidneys are unremarkable.    The bowel is nondilated.  " The appendix is normal.  The colon is   unremarkable.    There is no free air or free fluid.    No significant bony abnormality.      CT shows acute on chronic pancreatic cyst.  No evidence of acute pancreatitis.  Lipase is normal.  Patient continues to tolerate p.o. medications, on re-evaluation was complaining of 8/10 pain.  An additional dose of 10 mg oxycodone ordered.  Patient is requesting IV Dilaudid, do not feel this is appropriate given the chronic nature of his complaints and no acute evidence of pancreatitis.  Patient was requesting milk.  Tolerating p.o. and appears stable for discharge and close outpatient follow up.

## 2025-07-17 NOTE — DISCHARGE INSTRUCTIONS
Diagnosis:  Pancreatitis    Do not drink alcohol if you have a history of pancreatitis.     Tests today showed:   Labs Reviewed   COMPREHENSIVE METABOLIC PANEL - Abnormal       Result Value    Sodium 142      Potassium 4.5      Chloride 106      CO2 20 (*)     Glucose 115 (*)     BUN 12      Creatinine 0.8      Calcium 8.4 (*)     Protein Total 8.1      Albumin 3.9      Bilirubin Total 0.3      ALP 86      AST 56 (*)     ALT 49 (*)     Anion Gap 16      eGFR >60      Narrative:     Specimen moderately hemolyzed.   CBC WITH DIFFERENTIAL - Abnormal    WBC 5.11      RBC 5.05      HGB 14.0      HCT 43.3      MCV 86      MCH 27.7      MCHC 32.3      RDW 19.1 (*)     Platelet Count 231      MPV 9.0 (*)     Nucleated RBC 0      Neut % 28.9 (*)     Lymph % 55.4 (*)     Mono % 11.0      Eos % 3.7      Basophil % 1.0      Imm Grans % 0.0      Neut # 1.48 (*)     Lymph # 2.83      Mono # 0.56      Eos # 0.19      Baso # 0.05      Imm Grans # 0.00     LIPASE - Normal    Lipase Level 21     CBC W/ AUTO DIFFERENTIAL    Narrative:     The following orders were created for panel order CBC auto differential.  Procedure                               Abnormality         Status                     ---------                               -----------         ------                     CBC with Differential[[id number]]       Abnormal            Final result                 Please view results for these tests on the individual orders.   EXTRA TUBES    Narrative:     The following orders were created for panel order EXTRA TUBES.  Procedure                               Abnormality         Status                     ---------                               -----------         ------                     Light Green Top Hold[[id number]]                            Final result               Lavender Top Hold[[id number]]                               Final result                 Please view results for these tests on the individual orders.    LIGHT GREEN TOP HOLD    Extra Tube Hold for add-ons.     LAVENDER TOP HOLD    Extra Tube Hold for add-ons.     URINALYSIS   URINALYSIS, REFLEX TO URINE CULTURE     CT Abdomen Pelvis With IV Contrast NO Oral Contrast   Final Result      Interval increase in size of peripancreatic fluid collection, with location suggesting pancreatic pseudo cyst.  Close follow-up is recommended.      Hepatomegaly and hepatic steatosis.      Mild splenomegaly.         Electronically signed by: Adolfo Bobo MD   Date:    07/17/2025   Time:    06:24          Treatments you had today:   Medications   sodium chloride 0.9% bolus 1,000 mL 1,000 mL (0 mLs Intravenous Stopped 7/17/25 0810)   ondansetron injection 4 mg (4 mg Intravenous Given 7/17/25 0507)   famotidine (PF) injection 20 mg (20 mg Intravenous Given 7/17/25 0512)   oxyCODONE immediate release tablet Tab 10 mg (10 mg Oral Given 7/17/25 0541)   iohexoL (OMNIPAQUE 350) injection 100 mL (100 mLs Intravenous Given 7/17/25 0612)   ondansetron injection 4 mg (4 mg Intravenous Given 7/17/25 0806)   oxyCODONE immediate release tablet Tab 10 mg (10 mg Oral Given 7/17/25 0806)       Follow-Up Plan:  - Follow-up with primary care doctor within 3 - 5 days  - Additional testing and/or evaluation as directed by your primary doctor    Return to the Emergency Department for symptoms including but not limited to: worsening symptoms, shortness of breath or chest pain, vomiting with inability to hold down fluids, fevers greater than 100.4°F, passing out/fainting/unconsciousness, or other concerning symptoms.

## 2025-07-17 NOTE — ED PROVIDER NOTES
Source of History:  Patient  Chart    Chief complaint:  Abdominal Pain (C/o left upper abdominal pain, describes as pancreatitis flare up, )      HPI:  Tasia Cardona is a 25 y.o. male with history of HIV on HAART, chronic pancreatitis, alcohol abuse, polycythemia vera history of splenic vein thrombosis no longer on anticoagulation presenting to emergency department with complaint of abdominal pain and vomiting.      Patient states pain began yesterday after eating a pulled pork sandwich and drinking 2 rum and cokes.  Pain is constant, severe, located in the left side of the abdomen.  Multiple episodes of vomiting.  Normal bowel movements.  No urinary complaints.    Chart reviewed.  Frequent emergency department visits for similar complaints.  There is concern about opioid dependence per notes.  His constellation of allergies includes Toradol, droperidol, and morphine.    Review of patient's allergies indicates:   Allergen Reactions    Stratton Swelling    Droperidol Hives    Morphine     Pecan nut Swelling    Penicillins Hives     Tolerates cephalosporins    Tolerated piperacillin/tazobactam 11/2023    Sulfa (sulfonamide antibiotics) Hives    Tolonium chloride(toluidine blue-o) Hives, Itching and Rash    Toradol [ketorolac] Rash       Medications Ordered Prior to Encounter[1]    PMH:  As per HPI and below:  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     Past Surgical History:   Procedure Laterality Date    ENDOSCOPIC ULTRASOUND OF UPPER GASTROINTESTINAL  TRACT N/A 10/23/2023    Procedure: ULTRASOUND, UPPER GI TRACT, ENDOSCOPIC;  Surgeon: Emil Villatoro MD;  Location: [facility] ENDO (2ND FLR);  Service: Endoscopy;  Laterality: N/A;    ENDOSCOPIC ULTRASOUND OF UPPER GASTROINTESTINAL TRACT N/A 7/24/2024    Procedure: ULTRASOUND, UPPER GI TRACT, ENDOSCOPIC;  Surgeon: Faustino Trujillo MD;  Location: [facility] ENDO (2ND FLR);  Service: Endoscopy;  Laterality: N/A;    ERCP N/A 10/23/2023    Procedure: ERCP (ENDOSCOPIC RETROGRADE CHOLANGIOPANCREATOGRAPHY);  Surgeon: Emil Villatoro MD;  Location: [facility] ENDO (2ND FLR);  Service: Endoscopy;  Laterality: N/A;    ESOPHAGOGASTRODUODENOSCOPY N/A 9/18/2023    Procedure: EGD (ESOPHAGOGASTRODUODENOSCOPY);  Surgeon: Kg Cleaning MD;  Location: [facility] ENDO (2ND FLR);  Service: Endoscopy;  Laterality: N/A;    ESOPHAGOGASTRODUODENOSCOPY N/A 3/8/2024    Procedure: EGD (ESOPHAGOGASTRODUODENOSCOPY);  Surgeon: Greg Love MD;  Location: [facility] END[facility] (2ND FLR);  Service: Endoscopy;  Laterality: N/A;    ESOPHAGOGASTRODUODENOSCOPY N/A 7/24/2024    Procedure: EGD (ESOPHAGOGASTRODUODENOSCOPY);  Surgeon: Faustino Trujillo MD;  Location: [facility] END[facility] (2ND FLR);  Service: Endoscopy;  Laterality: N/A;       Social History     Socioeconomic History    Marital status: Single   Tobacco Use    Smoking status: Former     Current packs/day: 0.10     Average packs/day: 0.1 packs/day for 4.5 years (0.5 ttl pk-yrs)     Types: Cigarettes     Start date: 2021    Smokeless tobacco: Never   Vaping Use    Vaping status: Never Used   Substance and Sexual Activity    Alcohol use: Yes     Alcohol/week: 4.0 standard drinks of alcohol     Types: 4 Shots of liquor per week    Drug use: Yes     Types: Marijuana    Sexual activity: Yes     Partners: Male     Social Drivers of Health     Financial Resource Strain: Patient Declined (4/29/2025)    Overall Financial Resource Strain (CARDIA)     Difficulty of Paying Living Expenses: Patient declined   Food Insecurity: Patient  Declined (4/29/2025)    Hunger Vital Sign     Worried About Running Out of Food in the Last Year: Patient declined     Ran Out of Food in the Last Year: Patient declined   Transportation Needs: Patient Declined (4/29/2025)    PRAPARE - Transportation     Lack of Transportation (Medical): Patient declined     Lack of Transportation (Non-Medical): Patient declined   Physical Activity: Inactive (3/30/2025)    Exercise Vital Sign     Days of Exercise per Week: 0 days     Minutes of Exercise per Session: 0 min   Stress: Patient Declined (4/29/2025)    [employer] - Occupational Stress Questionnaire     Feeling of Stress : Patient declined   Housing Stability: Patient Declined (4/29/2025)    Housing Stability Vital Sign     Unable to Pay for Housing in the Last Year: Patient declined     Homeless in the Last Year: Patient declined       Family History   Problem Relation Name Age of Onset    Pancreatitis Mother      Cancer Mother      Ovarian cancer Mother      No Known Problems Father      Cancer Brother      Breast cancer Maternal Grandmother         Physical Exam:      Vitals:    07/17/25 0541   BP:    Pulse:    Resp: 18   Temp:      Gen: No acute distress.  Nontoxic.  Well appearing.  Smiling, comfortable.  Mental Status:  Alert and oriented.  Appropriate, conversant.  Skin: Warm, dry. No rashes seen.  Eyes: No conjunctival injection.  Pulm: CTAB. No increased work of breathing.  No significant tachypnea.  No audible stridor or wheezing.  No conversational dyspnea.    CV: Regular rate. Regular rhythm.   Abd: Soft.  Not distended.  Tenderness to palpation of the left after quadrant and epigastric region without rebound tenderness or guarding.  MSK: Good range of motion all joints.  No deformities.    Neuro: Awake. Speech normal. No focal neuro deficit observed.      Laboratory Studies:  Labs Reviewed   COMPREHENSIVE METABOLIC PANEL - Abnormal       Result Value    Sodium 142      Potassium  4.5      Chloride 106      CO2 20 (*)     Glucose 115 (*)     BUN 12      Creatinine 0.8      Calcium 8.4 (*)     Protein Total 8.1      Albumin 3.9      Bilirubin Total 0.3      ALP 86      AST 56 (*)     ALT 49 (*)     Anion Gap 16      eGFR >60      Narrative:     Specimen moderately hemolyzed.   CBC WITH DIFFERENTIAL - Abnormal    WBC 5.11      RBC 5.05      HGB 14.0      HCT 43.3      MCV 86      MCH 27.7      MCHC 32.3      RDW 19.1 (*)     Platelet Count 231      MPV 9.0 (*)     Nucleated RBC 0      Neut % 28.9 (*)     Lymph % 55.4 (*)     Mono % 11.0      Eos % 3.7      Basophil % 1.0      Imm Grans % 0.0      Neut # 1.48 (*)     Lymph # 2.83      Mono # 0.56      Eos # 0.19      Baso # 0.05      Imm Grans # 0.00     LIPASE - Normal    Lipase Level 21     CBC W/ AUTO DIFFERENTIAL    Narrative:     The following orders were created for panel order CBC auto differential.                  Procedure                               Abnormality         Status                                     ---------                               -----------         ------                                     CBC with Differential[[id number]]       Abnormal            Final result                                                 Please view results for these tests on the individual orders.   URINALYSIS   URINALYSIS, REFLEX TO URINE CULTURE   EXTRA TUBES    Narrative:     The following orders were created for panel order EXTRA TUBES.                  Procedure                               Abnormality         Status                                     ---------                               -----------         ------                                     Light Green Top Hold[[id number]]                            In process                                 Lavender Top Hold[[id number]]                               In process                                                   Please view results for these tests on the individual  orders.   LIGHT GREEN TOP HOLD   LAVENDER TOP HOLD     Chart reviewed.    Imaging Results              CT Abdomen Pelvis With IV Contrast NO Oral Contrast (In process)                     Medications Given:  Medications   sodium chloride 0.9% bolus 1,000 mL 1,000 mL (1,000 mLs Intravenous New Bag 7/17/25 0506)   iohexoL (OMNIPAQUE 350) injection 100 mL (has no administration in time range)   ondansetron injection 4 mg (4 mg Intravenous Given 7/17/25 0507)   famotidine (PF) injection 20 mg (20 mg Intravenous Given 7/17/25 0512)   oxyCODONE immediate release tablet Tab 10 mg (10 mg Oral Given 7/17/25 0541)       MDM:    25 y.o. male with history of chronic pancreatitis, alcohol abuse, a suspected drug-seeking behavior presenting to emergency department with complaint of abdominal pain and vomiting.  No active vomiting noted here.  He is afebrile and hemodynamically stable.  He received fluids, Zofran, and famotidine.      Labs reviewed.  No leukocytosis.  Stable hemoglobin.  CMP remarkable for bicarb of 20.  Blood glucose 115.  Chronic transaminitis noted.  Normal lipase.    5:31 AM  Patient continued to complain of pain.  Given concern for drug-seeking behavior, will not give IV opiates.  Will give oxycodone orally which he has previously been prescribed.    Anticipate sign out to oncoming team pending CT abdomen pelvis for final disposition.    Diagnostic Impression:    1. Abdominal pain, unspecified abdominal location               Launch [facility] Module  Jul 17 2025 5:48 AM [Roxy Torres]  Data:  - Test/documents/historian: 3+ tests ordered  Problems: Abdominal pain, unspecified abdominal location  Risk: NaCl bolus + 2 more (Rx drug management), CT Abd+Pelvis W contr IV (Iodinated IV contrast in low-risk patient)      Patient understands the plan and is in agreement, verbalized understanding, questions answered    Roxy Torres MD  Emergency Medicine           [1]   No current  facility-administered medications on file prior to encounter.     Current Outpatient Medications on File Prior to Encounter   Medication Sig Dispense Refill    zkuzbfidg-jffhwtxh-dpzbmtm ala (BIKTARVY) -25 mg (25 kg or greater) Take 1 tablet by mouth once daily. 30 tablet 5    famotidine (PEPCID) 20 MG tablet Take 1 tablet (20 mg total) by mouth 2 (two) times daily as needed for Heartburn. 20 tablet 0    metoclopramide HCl (REGLAN) 10 MG tablet Take 1 tablet (10 mg total) by mouth every 6 (six) hours. 30 tablet 0    omeprazole (PRILOSEC) 20 MG capsule Take 1 capsule (20 mg total) by mouth once daily. 30 capsule 11    oxyCODONE-acetaminophen (PERCOCET) 5-325 mg per tablet Take 1 tablet by mouth every 6 (six) hours as needed for Pain. 12 tablet 0    promethazine (PHENERGAN) 25 MG tablet Take 1 tablet (25 mg total) by mouth every 6 (six) hours as needed for Nausea. 15 tablet 0        Roxy Torres MD  07/17/25 0548

## 2025-08-18 ENCOUNTER — HOSPITAL ENCOUNTER (EMERGENCY)
Facility: OTHER | Age: 25
Discharge: HOME | End: 2025-08-18
Attending: EMERGENCY MEDICINE
Payer: MEDICAID

## 2025-08-18 VITALS
BODY MASS INDEX: 22.9 KG/M2 | HEIGHT: 70 IN | DIASTOLIC BLOOD PRESSURE: 91 MMHG | TEMPERATURE: 98 F | OXYGEN SATURATION: 97 % | HEART RATE: 84 BPM | WEIGHT: 160 LBS | SYSTOLIC BLOOD PRESSURE: 135 MMHG | RESPIRATION RATE: 20 BRPM

## 2025-08-18 DIAGNOSIS — R10.12 LUQ ABDOMINAL PAIN: Primary | ICD-10-CM

## 2025-08-18 LAB
ABSOLUTE EOSINOPHIL (OHS): 0.19 K/UL
ABSOLUTE MONOCYTE (OHS): 0.71 K/UL (ref 0.3–1)
ABSOLUTE NEUTROPHIL COUNT (OHS): 3.54 K/UL (ref 1.8–7.7)
ALBUMIN SERPL BCP-MCNC: 3.8 G/DL (ref 3.5–5.2)
ALP SERPL-CCNC: 91 UNIT/L (ref 40–150)
ALT SERPL W/O P-5'-P-CCNC: 55 UNIT/L (ref 10–44)
ANION GAP (OHS): 9 MMOL/L (ref 8–16)
AST SERPL-CCNC: 74 UNIT/L (ref 11–45)
BASOPHILS # BLD AUTO: 0.04 K/UL
BASOPHILS NFR BLD AUTO: 0.6 %
BILIRUB SERPL-MCNC: 0.7 MG/DL (ref 0.1–1)
BUN SERPL-MCNC: 10 MG/DL (ref 6–20)
CALCIUM SERPL-MCNC: 8.7 MG/DL (ref 8.7–10.5)
CHLORIDE SERPL-SCNC: 103 MMOL/L (ref 95–110)
CO2 SERPL-SCNC: 23 MMOL/L (ref 23–29)
CREAT SERPL-MCNC: 0.7 MG/DL (ref 0.5–1.4)
CREAT SERPL-MCNC: 0.7 MG/DL (ref 0.5–1.4)
ERYTHROCYTE [DISTWIDTH] IN BLOOD BY AUTOMATED COUNT: 17.3 % (ref 11.5–14.5)
ETHANOL SERPL-MCNC: <10 MG/DL
GFR SERPLBLD CREATININE-BSD FMLA CKD-EPI: >60 ML/MIN/1.73/M2
GLUCOSE SERPL-MCNC: 112 MG/DL (ref 70–110)
HCT VFR BLD AUTO: 44.5 % (ref 40–54)
HGB BLD-MCNC: 14.7 GM/DL (ref 14–18)
IMM GRANULOCYTES # BLD AUTO: 0.02 K/UL (ref 0–0.04)
IMM GRANULOCYTES NFR BLD AUTO: 0.3 % (ref 0–0.5)
LIPASE SERPL-CCNC: 27 U/L (ref 4–60)
LYMPHOCYTES # BLD AUTO: 2.51 K/UL (ref 1–4.8)
MCH RBC QN AUTO: 28.9 PG (ref 27–31)
MCHC RBC AUTO-ENTMCNC: 33 G/DL (ref 32–36)
MCV RBC AUTO: 87 FL (ref 82–98)
NUCLEATED RBC (/100WBC) (OHS): 0 /100 WBC
PLATELET # BLD AUTO: 174 K/UL (ref 150–450)
PMV BLD AUTO: 9.6 FL (ref 9.2–12.9)
POTASSIUM SERPL-SCNC: 3.4 MMOL/L (ref 3.5–5.1)
PROT SERPL-MCNC: 7 GM/DL (ref 6–8.4)
RBC # BLD AUTO: 5.09 M/UL (ref 4.6–6.2)
RELATIVE EOSINOPHIL (OHS): 2.7 %
RELATIVE LYMPHOCYTE (OHS): 35.8 % (ref 18–48)
RELATIVE MONOCYTE (OHS): 10.1 % (ref 4–15)
RELATIVE NEUTROPHIL (OHS): 50.5 % (ref 38–73)
SAMPLE: NORMAL
SODIUM SERPL-SCNC: 135 MMOL/L (ref 136–145)
WBC # BLD AUTO: 7.01 K/UL (ref 3.9–12.7)

## 2025-08-18 PROCEDURE — 80053 COMPREHEN METABOLIC PANEL: CPT | Performed by: NURSE PRACTITIONER

## 2025-08-18 PROCEDURE — 96361 HYDRATE IV INFUSION ADD-ON: CPT

## 2025-08-18 PROCEDURE — 82077 ASSAY SPEC XCP UR&BREATH IA: CPT

## 2025-08-18 PROCEDURE — 63600175 PHARM REV CODE 636 W HCPCS

## 2025-08-18 PROCEDURE — 25000003 PHARM REV CODE 250

## 2025-08-18 PROCEDURE — 83690 ASSAY OF LIPASE: CPT | Performed by: NURSE PRACTITIONER

## 2025-08-18 PROCEDURE — 99900035 HC TECH TIME PER 15 MIN (STAT)

## 2025-08-18 PROCEDURE — 96374 THER/PROPH/DIAG INJ IV PUSH: CPT

## 2025-08-18 PROCEDURE — 85025 COMPLETE CBC W/AUTO DIFF WBC: CPT | Performed by: NURSE PRACTITIONER

## 2025-08-18 PROCEDURE — 63600175 PHARM REV CODE 636 W HCPCS: Performed by: NURSE PRACTITIONER

## 2025-08-18 PROCEDURE — 96375 TX/PRO/DX INJ NEW DRUG ADDON: CPT

## 2025-08-18 PROCEDURE — 99284 EMERGENCY DEPT VISIT MOD MDM: CPT | Mod: 25

## 2025-08-18 PROCEDURE — 82565 ASSAY OF CREATININE: CPT

## 2025-08-18 RX ORDER — PANTOPRAZOLE SODIUM 40 MG/10ML
40 INJECTION, POWDER, LYOPHILIZED, FOR SOLUTION INTRAVENOUS
Status: COMPLETED | OUTPATIENT
Start: 2025-08-18 | End: 2025-08-18

## 2025-08-18 RX ORDER — KETOCONAZOLE 20 MG/ML
SHAMPOO, SUSPENSION TOPICAL
Qty: 120 ML | Refills: 0 | Status: SHIPPED | OUTPATIENT
Start: 2025-08-18

## 2025-08-18 RX ORDER — OXYCODONE HYDROCHLORIDE 5 MG/1
5 TABLET ORAL
Refills: 0 | Status: COMPLETED | OUTPATIENT
Start: 2025-08-18 | End: 2025-08-18

## 2025-08-18 RX ORDER — PANTOPRAZOLE SODIUM 20 MG/1
20 TABLET, DELAYED RELEASE ORAL DAILY
Qty: 30 TABLET | Refills: 0 | Status: SHIPPED | OUTPATIENT
Start: 2025-08-18 | End: 2026-08-18

## 2025-08-18 RX ORDER — ONDANSETRON HYDROCHLORIDE 2 MG/ML
4 INJECTION, SOLUTION INTRAVENOUS
Status: COMPLETED | OUTPATIENT
Start: 2025-08-18 | End: 2025-08-18

## 2025-08-18 RX ORDER — PROMETHAZINE HYDROCHLORIDE 25 MG/1
25 TABLET ORAL EVERY 6 HOURS PRN
Qty: 15 TABLET | Refills: 0 | Status: SHIPPED | OUTPATIENT
Start: 2025-08-18

## 2025-08-18 RX ORDER — DICYCLOMINE HYDROCHLORIDE 10 MG/1
20 CAPSULE ORAL
Status: COMPLETED | OUTPATIENT
Start: 2025-08-18 | End: 2025-08-18

## 2025-08-18 RX ORDER — METOCLOPRAMIDE HYDROCHLORIDE 5 MG/ML
10 INJECTION INTRAMUSCULAR; INTRAVENOUS
Status: COMPLETED | OUTPATIENT
Start: 2025-08-18 | End: 2025-08-18

## 2025-08-18 RX ADMIN — SODIUM CHLORIDE 1000 ML: 9 INJECTION, SOLUTION INTRAVENOUS at 08:08

## 2025-08-18 RX ADMIN — DICYCLOMINE HYDROCHLORIDE 20 MG: 10 CAPSULE ORAL at 10:08

## 2025-08-18 RX ADMIN — ONDANSETRON 4 MG: 2 INJECTION INTRAMUSCULAR; INTRAVENOUS at 08:08

## 2025-08-18 RX ADMIN — PANTOPRAZOLE SODIUM 40 MG: 40 INJECTION, POWDER, FOR SOLUTION INTRAVENOUS at 10:08

## 2025-08-18 RX ADMIN — OXYCODONE HYDROCHLORIDE 5 MG: 5 TABLET ORAL at 10:08

## 2025-08-18 RX ADMIN — METOCLOPRAMIDE 10 MG: 5 INJECTION, SOLUTION INTRAMUSCULAR; INTRAVENOUS at 08:08
